# Patient Record
Sex: MALE | Race: WHITE | Employment: OTHER | URBAN - NONMETROPOLITAN AREA
[De-identification: names, ages, dates, MRNs, and addresses within clinical notes are randomized per-mention and may not be internally consistent; named-entity substitution may affect disease eponyms.]

---

## 2017-03-27 ENCOUNTER — OFFICE VISIT (OUTPATIENT)
Dept: CARDIOLOGY | Age: 77
End: 2017-03-27
Payer: MEDICARE

## 2017-03-27 VITALS
HEIGHT: 66 IN | DIASTOLIC BLOOD PRESSURE: 78 MMHG | BODY MASS INDEX: 31.82 KG/M2 | HEART RATE: 73 BPM | SYSTOLIC BLOOD PRESSURE: 120 MMHG | WEIGHT: 198 LBS

## 2017-03-27 DIAGNOSIS — I15.9 SECONDARY HYPERTENSION: Primary | ICD-10-CM

## 2017-03-27 PROCEDURE — 3017F COLORECTAL CA SCREEN DOC REV: CPT | Performed by: INTERNAL MEDICINE

## 2017-03-27 PROCEDURE — 1123F ACP DISCUSS/DSCN MKR DOCD: CPT | Performed by: INTERNAL MEDICINE

## 2017-03-27 PROCEDURE — 93000 ELECTROCARDIOGRAM COMPLETE: CPT | Performed by: INTERNAL MEDICINE

## 2017-03-27 PROCEDURE — G8484 FLU IMMUNIZE NO ADMIN: HCPCS | Performed by: INTERNAL MEDICINE

## 2017-03-27 PROCEDURE — G8427 DOCREV CUR MEDS BY ELIG CLIN: HCPCS | Performed by: INTERNAL MEDICINE

## 2017-03-27 PROCEDURE — 4040F PNEUMOC VAC/ADMIN/RCVD: CPT | Performed by: INTERNAL MEDICINE

## 2017-03-27 PROCEDURE — G8417 CALC BMI ABV UP PARAM F/U: HCPCS | Performed by: INTERNAL MEDICINE

## 2017-03-27 PROCEDURE — 1036F TOBACCO NON-USER: CPT | Performed by: INTERNAL MEDICINE

## 2017-03-27 PROCEDURE — 99204 OFFICE O/P NEW MOD 45 MIN: CPT | Performed by: INTERNAL MEDICINE

## 2017-03-27 RX ORDER — ASPIRIN 81 MG/1
81 TABLET, CHEWABLE ORAL DAILY
COMMUNITY
End: 2019-08-20 | Stop reason: DRUGHIGH

## 2017-03-27 RX ORDER — LISINOPRIL 5 MG/1
5 TABLET ORAL DAILY
COMMUNITY
Start: 2017-03-10 | End: 2018-04-10 | Stop reason: SDUPTHER

## 2017-03-27 RX ORDER — ATENOLOL 100 MG/1
100 TABLET ORAL DAILY
COMMUNITY
Start: 2017-02-26 | End: 2017-09-05 | Stop reason: SDUPTHER

## 2017-03-27 RX ORDER — FUROSEMIDE 20 MG/1
20 TABLET ORAL DAILY
COMMUNITY
Start: 2017-02-27 | End: 2017-09-05 | Stop reason: SDUPTHER

## 2017-03-27 ASSESSMENT — ENCOUNTER SYMPTOMS: SHORTNESS OF BREATH: 0

## 2017-09-05 DIAGNOSIS — E78.5 HYPERLIPIDEMIA, UNSPECIFIED HYPERLIPIDEMIA TYPE: Primary | ICD-10-CM

## 2017-09-06 RX ORDER — ATENOLOL 100 MG/1
100 TABLET ORAL DAILY
Qty: 90 TABLET | Refills: 2 | Status: SHIPPED | OUTPATIENT
Start: 2017-09-06 | End: 2017-09-07 | Stop reason: SDUPTHER

## 2017-09-06 RX ORDER — FUROSEMIDE 20 MG/1
20 TABLET ORAL DAILY
Qty: 90 TABLET | Refills: 2 | Status: SHIPPED | OUTPATIENT
Start: 2017-09-06 | End: 2017-09-07 | Stop reason: SDUPTHER

## 2017-09-07 DIAGNOSIS — E78.5 HYPERLIPIDEMIA, UNSPECIFIED HYPERLIPIDEMIA TYPE: ICD-10-CM

## 2017-09-07 DIAGNOSIS — I10 ESSENTIAL HYPERTENSION: Primary | ICD-10-CM

## 2017-09-08 RX ORDER — ATENOLOL 100 MG/1
100 TABLET ORAL DAILY
Qty: 30 TABLET | Refills: 5 | Status: SHIPPED | OUTPATIENT
Start: 2017-09-08 | End: 2018-04-10 | Stop reason: SDUPTHER

## 2017-09-08 RX ORDER — FUROSEMIDE 20 MG/1
20 TABLET ORAL DAILY
Qty: 30 TABLET | Refills: 5 | Status: SHIPPED | OUTPATIENT
Start: 2017-09-08 | End: 2018-04-10 | Stop reason: SDUPTHER

## 2017-10-02 ENCOUNTER — OFFICE VISIT (OUTPATIENT)
Dept: CARDIOLOGY | Age: 77
End: 2017-10-02
Payer: MEDICARE

## 2017-10-02 VITALS
DIASTOLIC BLOOD PRESSURE: 84 MMHG | WEIGHT: 193 LBS | SYSTOLIC BLOOD PRESSURE: 124 MMHG | BODY MASS INDEX: 31.02 KG/M2 | HEART RATE: 66 BPM | HEIGHT: 66 IN

## 2017-10-02 DIAGNOSIS — I10 ESSENTIAL HYPERTENSION: Primary | ICD-10-CM

## 2017-10-02 DIAGNOSIS — I42.8 CARDIOMYOPATHY, NONISCHEMIC (HCC): ICD-10-CM

## 2017-10-02 DIAGNOSIS — I47.29 NSVT (NONSUSTAINED VENTRICULAR TACHYCARDIA): ICD-10-CM

## 2017-10-02 PROCEDURE — 99213 OFFICE O/P EST LOW 20 MIN: CPT | Performed by: NURSE PRACTITIONER

## 2017-10-02 PROCEDURE — G8417 CALC BMI ABV UP PARAM F/U: HCPCS | Performed by: NURSE PRACTITIONER

## 2017-10-02 PROCEDURE — G8427 DOCREV CUR MEDS BY ELIG CLIN: HCPCS | Performed by: NURSE PRACTITIONER

## 2017-10-02 PROCEDURE — 1036F TOBACCO NON-USER: CPT | Performed by: NURSE PRACTITIONER

## 2017-10-02 PROCEDURE — 1123F ACP DISCUSS/DSCN MKR DOCD: CPT | Performed by: NURSE PRACTITIONER

## 2017-10-02 PROCEDURE — 93000 ELECTROCARDIOGRAM COMPLETE: CPT | Performed by: NURSE PRACTITIONER

## 2017-10-02 PROCEDURE — 4040F PNEUMOC VAC/ADMIN/RCVD: CPT | Performed by: NURSE PRACTITIONER

## 2017-10-02 PROCEDURE — G8484 FLU IMMUNIZE NO ADMIN: HCPCS | Performed by: NURSE PRACTITIONER

## 2017-10-02 PROCEDURE — 3017F COLORECTAL CA SCREEN DOC REV: CPT | Performed by: NURSE PRACTITIONER

## 2017-10-02 NOTE — MR AVS SNAPSHOT
· Weight loss. · Exercise. · Limit the use of salt. · Stop smoking. · If using decongestants or birth control pills, talk to your doctor. These medicines might make blood pressure higher. · Do not use street drugs. · Females should not drink more than 1 alcoholic drink per day. Males should not drink more than 2 alcoholic drinks per day. · Take your blood pressure medicine. You will need to take it every day. If you do not get treated, there are risks, including:   · Heart disease. · Stroke. · Kidney failure. · See your doctor as told. GET HELP RIGHT AWAY IF:  · You get a very bad headache. · You get blurred or changing vision. · You feel confused. · You feel weak, numb, or faint. · You get chest or belly (abdominal) pain. · You throw up (vomit). · You cannot breathe very well. If you have a blood pressure reading with a top number of 180 or higher, you need to see your doctor right away. This is especially true if you are having any of the problems listed above. Cardiomyopathy     Definition   Cardiomyopathy refers to heart muscle disease. The damaged heart does not effectively pump blood. The disease usually progresses to the point where patients develop life-threatening heart failure . In addition, people with cardiomyopathy are more likely to have irregular heartbeats or arrhythmias. There are two major categories of cardiomyopathy: ischemic and non-ischemic cardiomyopathy. Ischemic cardiomyopathy occurs when the heart muscle is damaged from heart attacks due to coronary artery disease . Non-ischemic cardiomyopathy, the less common category, includes types of cardiomyopathy that are not related to coronary artery disease. There are three main types of non-ischemic cardiomyopathy:   Dilated Damaged heart muscles lead to an enlarged, floppy heart. The heart stretches as it tries to compensate for weakened pumping ability. Blood clots may form due to the abnormal pooling of blood in the heart. If a clot moves to another part of the body ( embolism ), symptoms associated with that organ (the brain, for example) may be the first sign of the heart disease. Cardiomyopathy ultimately leads to heart failure and the following symptoms:   Fatigue   Weakness   Shortness of breath, often worse when lying down or with exertion   Cough   Swelling in feet or legs   Chest pain   Irregular heart rhythm   Diagnosis   The doctor will ask about your symptoms and medical history. A physical exam will be done. The doctor will listen to your heart with a stethoscope. Cardiomyopathies often produce heart murmurs and other abnormal heart sounds. Tests may include:   Chest x-ray a test that uses radiation to take a picture of structures inside the chest, used to look for heart enlargement   Electrocardiogram a test that records the heart's activity by measuring electrical currents through the heart muscle   Echocardiogram a test that uses high-frequency sound waves (ultrasound) to examine the size, shape, and motion of the heart   Blood tests to check for damage to the heart and other organs, and possibly for the underlying cause(s) of the cardiomyopathy   Cardiac catheterization a tube-like instrument inserted into the heart through a vein or artery (usually in the arm or leg) to detect problems with the heart and its blood supply   Heart biopsy removal of a sample of heart tissue for testing   Treatment   When heart failure is due to blockages in the coronary arteries, treatment directed at relieving these blockages through angioplasty , stent placement , or coronary artery bypass surgery may lead to improvements in heart function and symptoms. For certain genetic causes, other treatments may also lead to improvements in function. For many patients, however, treatment is aimed at relieving symptoms and prevent further damage.    Lifestyle Modification Changes aim to eliminate anything that contributes to the disease or worsens symptoms:   Avoid alcohol. If you are overweight, lose weight . Eat a low-fat diet to minimize the risk and extent of coronary artery disease. Limit salt intake to decrease fluid retention. Follow your doctor's advice for exercise . You may need to limit physical activity. Medications   Medications may include:   Diuretics to eliminate extra fluid   ACE inhibitors to help relax blood vessels, lower blood pressure, and decrease the heart's workload   Hydralazine and isosorbide dinitrate may be used in addition to ACE inhibitors   Angiotensin receptor blockers similar to ACE inhibitors   Digitalis to slow and regulate the heart rate, and modestly increase its force of contractions   Beta blockers to slow the heart and limit disease progression   Spironolactone to improve the outcome in people with dilated cardiomyopathy and advanced symptoms   Surgery   Surgical options include:   A pacemaker may be implanted to improve the heart rate and pattern. For people with hypertrophic disease, doctors may remove part of the thickened wall  the heart's chambers. Surgery may be needed to replace a heart valve. Another option is called alcohol septal ablation. This is a procedure to reduce symptoms and improve how the heart functions. For those with life-threatening, irregular heart rhythms, a cardioverter defibrillator may need to be implanted. A heart transplant may be possible for otherwise healthy patients who do not respond to medical treatment. Candidates often wait a long time for a new heart. Those waiting may temporarily receive a ventricular assist device, which is a mechanical pump that assumes some or most of the heart's pumping function. Prevention   Aggressively treating hypertension, coronary artery diseases, and their risk factors is the best way to prevent most cases of cardiomyopathy. Other, less common causes, however, are not preventable. People with a family history of the disease should ask the doctor about screening tests, especially before starting an intense exercise program.       Heart-Healthy Diet   Sodium, Fat, and Cholesterol Controlled Diet     What Is a Heart Healthy Diet? A heart-healthy diet is one that limits sodium , certain types of fat , and cholesterol . This type of diet is recommended for:   · People with any form of cardiovascular disease (eg, coronary heart disease , peripheral vascular disease , previous heart attack , previous stroke )   · People with risk factors for cardiovascular disease, such as high blood pressure , high cholesterol , or diabetes   · Anyone who wants to lower their risk of developing cardiovascular disease   Sodium   Sodium is a mineral found in many foods. In general, most people consume much more sodium than they need. Diets high in sodium can increase blood pressure and lead to edema (water retention). On a heart-healthy diet, you should consume no more than 2,300 mg (milligrams) of sodium per dayabout the amount in one teaspoon of table salt. The foods highest in sodium include table salt (about 50% sodium), processed foods, convenience foods, and preserved foods. Cholesterol   Cholesterol is a fat-like, waxy substance in your blood. Our bodies make some cholesterol. It is also found in animal products, with the highest amounts in fatty meat, egg yolks, whole milk, cheese, shellfish, and organ meats. On a heart-healthy diet, you should limit your cholesterol intake to less than 200 mg per day. It is normal and important to have some cholesterol in your bloodstream. But too much cholesterol can cause plaque to build up within your arteries, which can eventually lead to a heart attack or stroke.    The two types of cholesterol that are most commonly referred to are:   · Low-density lipoprotein (LDL) cholesterol Also known as bad cholesterol, this is the cholesterol that tends to build up along your arteries. Bad cholesterol levels are increased by eating fats that are saturated or hydrogenated. Optimal level of this cholesterol is less than 100. Over 130 starts to get risky for heart disease. · High-density lipoprotein (HDL) cholesterol Also known as good cholesterol, this type of cholesterol actually carries cholesterol away from your arteries and may, therefore, help lower your risk of having a heart attack. You want this level to be high (ideally greater than 60). It is a risk to have a level less than 40. You can raise this good cholesterol by eating olive oil, canola oil, avocados, or nuts. Exercise raises this level, too. Fat   Fat is calorie dense and packs a lot of calories into a small amount of food. Even though fats should be limited due to their high calorie content, not all fats are bad. In fact, some fats are quite healthful. Fat can be broken down into four main types.    · The good-for-you fats are:   ¨ Monounsaturated fat found in oils such as olive and canola, avocados, and nuts and natural nut butters; can decrease cholesterol levels, while keeping levels of HDL cholesterol high   ¨ Polyunsaturated fat found in oils such as safflower, sunflower, soybean, corn, and sesame; can decrease total cholesterol and LDL cholesterol   ¨ Omega-3 fatty acids particularly those found in fatty fish (such as salmon, trout, tuna, mackerel, herring, and sardines); can decrease risk of arrhythmias, decrease triglyceride levels, and slightly lower blood pressure   · The fats that you want to limit are:   ¨ Saturated fat found in animal products, many fast foods, and a few vegetables; increases total blood cholesterol, including LDL levels   § Animal fats that are saturated include: butter, lard, whole-milk dairy products, meat fat, and poultry skin   § Vegetable fats that are saturated include: hydrogenated shortening, palm Whole milk Reduced-fat (2%) milk Malted and chocolate milk Full fat yogurt Most cheeses (unless low-fat and low salt) Buttermilk (no more than 1 cup per week)   Meats and Beans   Lean cuts of fresh or frozen beef, veal, lamb, or pork (look for the word loin) Fresh or frozen poultry without the skin Fresh or frozen fish and some shellfish Egg whites and egg substitutes (Limit whole eggs to three per week) Tofu Nuts or seeds (unsalted, dry-roasted), low-sodium peanut butter Dried peas, beans, and lentils   Any smoked, cured, salted, or canned meat, fish, or poultry (including leon, chipped beef, cold cuts, hot dogs, sausages, sardines, and anchovies) Poultry skins Breaded and/or fried fish or meats Canned peas, beans, and lentils Salted nuts   Fats and Oils   Olive oil and canola oil Low-sodium, low-fat salad dressings and mayonnaise   Butter, margarine, coconut and palm oils, leon fat   Snacks, Sweets, and Condiments   Low-sodium or unsalted versions of broths, soups, soy sauce, and condiments Pepper, herbs, and spices; vinegar, lemon, or lime juice Low-fat frozen desserts (yogurt, sherbet, fruit bars) Sugar, cocoa powder, honey, syrup, jam, and preserves Low-fat, trans-fat free cookies, cakes, and pies Arnav and animal crackers, fig bars, meliza snaps   High-fat desserts Broth, soups, gravies, and sauces, made from instant mixes or other high-sodium ingredients Salted snack foods Canned olives Meat tenderizers, seasoning salt, and most flavored vinegars   Beverages   Low-sodium carbonated beverages Tea and coffee in moderation Soy milk   Commercially softened water   Suggestions   · Make whole grains, fruits, and vegetables the base of your diet. · Choose heart-healthy fats such as canola, olive, and flaxseed oil, and foods high in heart-healthy fats, such as nuts, seeds, soybeans, tofu, and fish.    · Eat fish at least twice per week; the fish highest in omega-3 fatty acids and lowest in mercury include salmon, herring, mackerel, sardines, and canned chunk light tuna. If you eat fish less than twice per week or have high triglycerides, talk to your doctor about taking fish oil supplements. · Read food labels. ¨ For products low in fat and cholesterol, look for fat free, low-fat, cholesterol free, saturated fat free, and trans fat freeAlso scan the Nutrition Facts Label, which lists saturated fat, trans fat, and cholesterol amounts. ¨ For products low in sodium, look for sodium free, very low sodium, low sodium, no added salt, and unsalted   · Skip the salt when cooking or at the table; if food needs more flavor, get creative and try out different herbs and spices. Garlic and onion also add substantial flavor to foods. · Trim any visible fat off meat and poultry before cooking, and drain the fat off after barger. · Use cooking methods that require little or no added fat, such as grilling, boiling, baking, poaching, broiling, roasting, steaming, stir-frying, and sauting. · Avoid fast food and convenience food. They tend to be high in saturated and trans fat and have a lot of added salt. · Talk to a registered dietitian for individualized diet advice.      Last Reviewed: March 2011 Masoud Ngo MS, MPH, RD   Updated: 3/29/2011               Medications and Orders      Your Current Medications Are              atenolol (TENORMIN) 100 MG tablet Take 1 tablet by mouth daily    furosemide (LASIX) 20 MG tablet Take 1 tablet by mouth daily    lisinopril (PRINIVIL;ZESTRIL) 5 MG tablet 5 mg daily    aspirin 81 MG chewable tablet Take 81 mg by mouth daily    Multiple Vitamin (MULTI VITAMIN DAILY PO) Take by mouth      Allergies              Penicillins       We Ordered/Performed the following           EKG 12 lead          Result Summary for EKG 12 lead      Result Information     Status          Final result (Collected: 10/2/2017  9:58 AM)           10/2/2017  9:58 AM Scans on Order [id number]            ECG on 10/2/2017  9:58 AM : ECG Report                     Additional Information        Basic Information     Date Of Birth Sex Race Ethnicity Preferred Language    1940 Male White Non-/Non  English      Preventive Care        Date Due    One-time abdominal aortic aneurism (AAA) screening is recommended for all men between the age of 73-68 who have ever smoked 1940    Tetanus Combination Vaccine (1 - Tdap) 6/26/1961    Cholesterol Screening 6/26/1982    Colonoscopy 6/26/1992    Zoster Vaccine 6/26/2002    Pneumococcal Vaccines (two) for all adults aged 72 and over (1 of 2 - PCV13) 6/26/2007    Yearly Flu Vaccine (1) 9/1/2017            MyChart Signup           [facility] allows you to send messages to your doctor, view your test results, renew your prescriptions, schedule appointments, view visit notes, and more. How Do I Sign Up? 1. In your Internet browser, go to [email or web address]  2. Click on the Sign Up Now link in the Sign In box. You will see the New Member Sign Up page. 3. Enter your [employer] Access Code exactly as it appears below. You will not need to use this code after youve completed the sign-up process. If you do not sign up before the expiration date, you must request a new code. [employer] Access Code: [email or web address]  Expires: 12/1/2017 10:38 AM    4. Enter your Social Security Number ([id number]) and Date of Birth (mm/dd/yyyy) as indicated and click Submit. You will be taken to the next sign-up page. 5. Create a [facility] ID. This will be your [employer] login ID and cannot be changed, so think of one that is secure and easy to remember. 6. Create a [employer] password. You can change your password at any time. 7. Enter your Password Reset Question and Answer. This can be used at a later time if you forget your password. 8. Enter your e-mail address.  You will receive e-mail notification when new information is available in [employer]. 9. Click Sign Up. You can now view your medical record. Additional Information  If you have questions, please contact the physician practice where you receive care. Remember, [employer] is NOT to be used for urgent needs. For medical emergencies, dial 911. For questions regarding your [employer] account call [phone]. If you have a clinical question, please call your doctor's office.

## 2017-10-02 NOTE — PATIENT INSTRUCTIONS
Hypertension  (High Blood Pressure)  Most people with high blood pressure (hypertension) have no symptoms. High blood pressure can be a dangerous problem. Hypertension is dangerous because you may have it and not know it. High blood pressure may mean that your heart needs to work harder to pump blood. Your blood pressure is measured with 2 numbers. The first number is when your heart flexes (contracts), and the second number is when your heart relaxes. The higher the numbers are, the more you are at risk for problems. Write down your blood pressure today. The best blood pressure for adults is 120/80 (mmHg) or lower. It is likely that your blood pressure was recorded at least 2 times today. It is important for you to give these numbers to your doctor. If you do not have a doctor, try to get follow-up care at a hospital or community clinic. You may need to start high blood pressure medicine. You may also need to adjust your medicines as told by your doctor. Even mild high blood pressure increases long-term health risks. One high reading does not mean you have hypertension. · Your blood pressure should be taken when you are relaxed. It is also important to sit for about 10 minutes before being tested. · Things that can increase your blood pressure are:  Injury, Illness, Stress, Caffeine, and some medicines (like decongestants). · High blood pressure does not usually need emergency treatment. HOME CARE  · Lifestyle and medicine changes may be needed, including:   · Weight loss. · Exercise. · Limit the use of salt. · Stop smoking. · If using decongestants or birth control pills, talk to your doctor. These medicines might make blood pressure higher. · Do not use street drugs. · Females should not drink more than 1 alcoholic drink per day. Males should not drink more than 2 alcoholic drinks per day. · Take your blood pressure medicine. You will need to take it every day.  If you do not get exact cause is not known. Possible causes include:   Dilated   The cause of the initial damage is often not found, but may include:   Ischemic heart disease with decreased blood flow to the heart   Infections, usually viral   Chronic exposure to toxins, including alcohol and some chemotherapy drugs   A rare complication of pregnancy or childbirth (probably immune-related)   Rarely, other illnesses, including rheumatoid arthritis , diabetes, or thyroid disease   Hypertrophic   Causes may include:   Inherited (sometimes present at birth but often developing in teens)   Aging, associated with hypertension   Restrictive   This is usually related to another condition, such as:   Amyloidosis protein fibers collect in the heart muscle   Sarcoidosis small inflammatory masses (granulomas) form in many organs   Hemochromatosis too much iron in the body   Risk Factors   A risk factor is something that increases your chance of getting a disease or condition. Risk factors for cardiomyopathy include:   Family members with cardiomyopathy   Alcoholism   Obesity   Diabetes   Hypertension   Coronary artery disease   Certain drugs   Symptoms   Symptoms vary, depending on the type of cardiomyopathy and its severity. Patients with hypertrophic cardiomyopathy often do not notice any symptoms. Sudden cardiac death may be the first indication of the condition. In dilated cardiomyopathy, it may take years for symptoms to develop. Blood clots may form due to the abnormal pooling of blood in the heart. If a clot moves to another part of the body ( embolism ), symptoms associated with that organ (the brain, for example) may be the first sign of the heart disease.    Cardiomyopathy ultimately leads to heart failure and the following symptoms:   Fatigue   Weakness   Shortness of breath, often worse when lying down or with exertion   Cough   Swelling in feet or legs   Chest pain   Irregular heart rhythm   Diagnosis   The doctor will ask decrease the heart's workload   Hydralazine and isosorbide dinitrate may be used in addition to ACE inhibitors   Angiotensin receptor blockers similar to ACE inhibitors   Digitalis to slow and regulate the heart rate, and modestly increase its force of contractions   Beta blockers to slow the heart and limit disease progression   Spironolactone to improve the outcome in people with dilated cardiomyopathy and advanced symptoms   Surgery   Surgical options include:   A pacemaker may be implanted to improve the heart rate and pattern. For people with hypertrophic disease, doctors may remove part of the thickened wall  the heart's chambers. Surgery may be needed to replace a heart valve. Another option is called alcohol septal ablation. This is a procedure to reduce symptoms and improve how the heart functions. For those with life-threatening, irregular heart rhythms, a cardioverter defibrillator may need to be implanted. A heart transplant may be possible for otherwise healthy patients who do not respond to medical treatment. Candidates often wait a long time for a new heart. Those waiting may temporarily receive a ventricular assist device, which is a mechanical pump that assumes some or most of the heart's pumping function. Prevention   Aggressively treating hypertension, coronary artery diseases, and their risk factors is the best way to prevent most cases of cardiomyopathy. Other, less common causes, however, are not preventable. People with a family history of the disease should ask the doctor about screening tests, especially before starting an intense exercise program.       Heart-Healthy Diet   Sodium, Fat, and Cholesterol Controlled Diet     What Is a Heart Healthy Diet? A heart-healthy diet is one that limits sodium , certain types of fat , and cholesterol .  This type of diet is recommended for:   · People with any form of cardiovascular disease (eg, coronary heart disease , peripheral needs more flavor, get creative and try out different herbs and spices. Garlic and onion also add substantial flavor to foods. · Trim any visible fat off meat and poultry before cooking, and drain the fat off after barger. · Use cooking methods that require little or no added fat, such as grilling, boiling, baking, poaching, broiling, roasting, steaming, stir-frying, and sauting. · Avoid fast food and convenience food. They tend to be high in saturated and trans fat and have a lot of added salt. · Talk to a registered dietitian for individualized diet advice.      Last Reviewed: March 2011 Maikel Lott MS, MPH, RD   Updated: 3/29/2011

## 2017-10-02 NOTE — PROGRESS NOTES
results found for: CREATININE, HGB, PROBNP    ECG 10/02/17  Sinus Rhythm, HR 66       Assessment, Recommendations, & Plan:  78 y.o. male with HTN, NICM, & NSVT    HTN- Controlled, No changes    NICM- On BB & ACE, No changes    NSVT- Denies palpitations, On atenolol, No changes      Disposition - RTC in 4 months or sooner if needed      Please do not hesitate to contact me for any questions or concerns.     Sincerely yours,    John Fisher, APRN

## 2018-04-10 ENCOUNTER — OFFICE VISIT (OUTPATIENT)
Dept: CARDIOLOGY | Age: 78
End: 2018-04-10
Payer: MEDICARE

## 2018-04-10 VITALS
BODY MASS INDEX: 32.14 KG/M2 | SYSTOLIC BLOOD PRESSURE: 140 MMHG | HEIGHT: 66 IN | HEART RATE: 74 BPM | WEIGHT: 200 LBS | DIASTOLIC BLOOD PRESSURE: 90 MMHG

## 2018-04-10 DIAGNOSIS — I10 ESSENTIAL HYPERTENSION: Primary | ICD-10-CM

## 2018-04-10 DIAGNOSIS — E78.5 HYPERLIPIDEMIA, UNSPECIFIED HYPERLIPIDEMIA TYPE: ICD-10-CM

## 2018-04-10 PROCEDURE — 4040F PNEUMOC VAC/ADMIN/RCVD: CPT | Performed by: INTERNAL MEDICINE

## 2018-04-10 PROCEDURE — G8417 CALC BMI ABV UP PARAM F/U: HCPCS | Performed by: INTERNAL MEDICINE

## 2018-04-10 PROCEDURE — 1123F ACP DISCUSS/DSCN MKR DOCD: CPT | Performed by: INTERNAL MEDICINE

## 2018-04-10 PROCEDURE — 3017F COLORECTAL CA SCREEN DOC REV: CPT | Performed by: INTERNAL MEDICINE

## 2018-04-10 PROCEDURE — 1036F TOBACCO NON-USER: CPT | Performed by: INTERNAL MEDICINE

## 2018-04-10 PROCEDURE — G8427 DOCREV CUR MEDS BY ELIG CLIN: HCPCS | Performed by: INTERNAL MEDICINE

## 2018-04-10 PROCEDURE — 93000 ELECTROCARDIOGRAM COMPLETE: CPT | Performed by: INTERNAL MEDICINE

## 2018-04-10 PROCEDURE — 99214 OFFICE O/P EST MOD 30 MIN: CPT | Performed by: INTERNAL MEDICINE

## 2018-04-10 RX ORDER — ATENOLOL 100 MG/1
100 TABLET ORAL DAILY
Qty: 90 TABLET | Refills: 3 | Status: ON HOLD | OUTPATIENT
Start: 2018-04-10 | End: 2019-03-18 | Stop reason: HOSPADM

## 2018-04-10 RX ORDER — FUROSEMIDE 20 MG/1
20 TABLET ORAL DAILY
Qty: 90 TABLET | Refills: 3 | Status: ON HOLD | OUTPATIENT
Start: 2018-04-10 | End: 2019-03-18 | Stop reason: HOSPADM

## 2018-04-10 RX ORDER — LISINOPRIL 10 MG/1
10 TABLET ORAL DAILY
Qty: 90 TABLET | Refills: 3 | Status: ON HOLD | OUTPATIENT
Start: 2018-04-10 | End: 2019-03-18 | Stop reason: HOSPADM

## 2018-04-10 ASSESSMENT — ENCOUNTER SYMPTOMS: SHORTNESS OF BREATH: 0

## 2018-07-31 ENCOUNTER — TELEPHONE (OUTPATIENT)
Dept: CARDIOLOGY | Age: 78
End: 2018-07-31

## 2018-10-09 ENCOUNTER — TRANSCRIBE ORDERS (OUTPATIENT)
Dept: ADMINISTRATIVE | Facility: HOSPITAL | Age: 78
End: 2018-10-09

## 2018-10-09 DIAGNOSIS — R19.09 OTHER INTRA-ABDOMINAL AND PELVIC SWELLING, MASS AND LUMP: Primary | ICD-10-CM

## 2018-10-09 DIAGNOSIS — J98.4 OTHER DISORDERS OF LUNG: ICD-10-CM

## 2018-10-11 ENCOUNTER — HOSPITAL ENCOUNTER (OUTPATIENT)
Dept: MRI IMAGING | Facility: HOSPITAL | Age: 78
Discharge: HOME | End: 2018-10-11
Attending: INTERNAL MEDICINE

## 2018-10-11 ENCOUNTER — HOSPITAL ENCOUNTER (OUTPATIENT)
Dept: CT IMAGING | Facility: HOSPITAL | Age: 78
Discharge: HOME | End: 2018-10-11
Attending: INTERNAL MEDICINE | Admitting: RADIOLOGY

## 2018-10-11 VITALS
OXYGEN SATURATION: 99 % | SYSTOLIC BLOOD PRESSURE: 143 MMHG | RESPIRATION RATE: 16 BRPM | DIASTOLIC BLOOD PRESSURE: 77 MMHG | WEIGHT: 180 LBS | BODY MASS INDEX: 28.93 KG/M2 | HEIGHT: 66 IN | TEMPERATURE: 96.4 F | HEART RATE: 75 BPM

## 2018-10-11 DIAGNOSIS — R19.09 OTHER INTRA-ABDOMINAL AND PELVIC SWELLING, MASS AND LUMP: ICD-10-CM

## 2018-10-11 DIAGNOSIS — J98.4 OTHER DISORDERS OF LUNG: ICD-10-CM

## 2018-10-11 LAB
ANION GAP SERPL CALCULATED.3IONS-SCNC: 10 MMOL/L (ref 4–13)
APTT PPP: 36.8 SECONDS (ref 24.1–34.8)
BUN BLD-MCNC: 23 MG/DL (ref 5–21)
BUN/CREAT SERPL: 12.3 (ref 7–25)
CALCIUM SPEC-SCNC: 10.3 MG/DL (ref 8.4–10.4)
CHLORIDE SERPL-SCNC: 106 MMOL/L (ref 98–110)
CO2 SERPL-SCNC: 25 MMOL/L (ref 24–31)
CREAT BLD-MCNC: 1.87 MG/DL (ref 0.5–1.4)
GFR SERPL CREATININE-BSD FRML MDRD: 35 ML/MIN/1.73
GLUCOSE BLD-MCNC: 120 MG/DL (ref 70–100)
INR PPP: 1.07 (ref 0.91–1.09)
PLATELET # BLD AUTO: 563 10*3/MM3 (ref 130–400)
POTASSIUM BLD-SCNC: 3.9 MMOL/L (ref 3.5–5.3)
PROTHROMBIN TIME: 14.2 SECONDS (ref 11.9–14.6)
SODIUM BLD-SCNC: 141 MMOL/L (ref 135–145)

## 2018-10-11 PROCEDURE — 80048 BASIC METABOLIC PNL TOTAL CA: CPT | Performed by: INTERNAL MEDICINE

## 2018-10-11 PROCEDURE — 0 GADOBENATE DIMEGLUMINE 529 MG/ML SOLUTION: Performed by: INTERNAL MEDICINE

## 2018-10-11 PROCEDURE — 85610 PROTHROMBIN TIME: CPT | Performed by: INTERNAL MEDICINE

## 2018-10-11 PROCEDURE — 85049 AUTOMATED PLATELET COUNT: CPT | Performed by: INTERNAL MEDICINE

## 2018-10-11 PROCEDURE — 71250 CT THORAX DX C-: CPT

## 2018-10-11 PROCEDURE — 85730 THROMBOPLASTIN TIME PARTIAL: CPT | Performed by: INTERNAL MEDICINE

## 2018-10-11 PROCEDURE — 74183 MRI ABD W/O CNTR FLWD CNTR: CPT

## 2018-10-11 PROCEDURE — A9577 INJ MULTIHANCE: HCPCS | Performed by: INTERNAL MEDICINE

## 2018-10-11 RX ORDER — SODIUM CHLORIDE 0.9 % (FLUSH) 0.9 %
3 SYRINGE (ML) INJECTION EVERY 12 HOURS SCHEDULED
Status: DISCONTINUED | OUTPATIENT
Start: 2018-10-11 | End: 2018-10-12 | Stop reason: HOSPADM

## 2018-10-11 RX ORDER — FERROUS SULFATE 325(65) MG
325 TABLET ORAL
Status: ON HOLD | COMMUNITY
End: 2019-01-03

## 2018-10-11 RX ORDER — LISINOPRIL 10 MG/1
10 TABLET ORAL
COMMUNITY
Start: 2018-04-10 | End: 2019-03-04

## 2018-10-11 RX ORDER — ATENOLOL 100 MG/1
100 TABLET ORAL
COMMUNITY
Start: 2018-04-10 | End: 2019-03-04

## 2018-10-11 RX ORDER — SODIUM CHLORIDE 0.9 % (FLUSH) 0.9 %
3-10 SYRINGE (ML) INJECTION AS NEEDED
Status: DISCONTINUED | OUTPATIENT
Start: 2018-10-11 | End: 2018-10-12 | Stop reason: HOSPADM

## 2018-10-11 RX ORDER — ASPIRIN 81 MG/1
81 TABLET, CHEWABLE ORAL DAILY
COMMUNITY
End: 2019-03-04

## 2018-10-11 RX ORDER — FUROSEMIDE 20 MG/1
20 TABLET ORAL
COMMUNITY
Start: 2018-04-10 | End: 2019-03-04

## 2018-10-11 RX ADMIN — GADOBENATE DIMEGLUMINE 16 ML: 529 INJECTION, SOLUTION INTRAVENOUS at 09:14

## 2018-10-23 ENCOUNTER — HOSPITAL ENCOUNTER (OUTPATIENT)
Dept: GENERAL RADIOLOGY | Facility: HOSPITAL | Age: 78
Discharge: HOME | End: 2018-10-23
Admitting: INTERNAL MEDICINE

## 2018-10-23 ENCOUNTER — APPOINTMENT (OUTPATIENT)
Dept: PREADMISSION TESTING | Facility: HOSPITAL | Age: 78
End: 2018-10-23

## 2018-10-23 VITALS
SYSTOLIC BLOOD PRESSURE: 156 MMHG | RESPIRATION RATE: 16 BRPM | HEART RATE: 84 BPM | OXYGEN SATURATION: 94 % | HEIGHT: 64 IN | BODY MASS INDEX: 31.35 KG/M2 | DIASTOLIC BLOOD PRESSURE: 85 MMHG | WEIGHT: 183.64 LBS

## 2018-10-23 LAB
DEPRECATED RDW RBC AUTO: 51 FL (ref 40–54)
ERYTHROCYTE [DISTWIDTH] IN BLOOD BY AUTOMATED COUNT: 17.9 % (ref 12–15)
HCT VFR BLD AUTO: 30.3 % (ref 40–52)
HGB BLD-MCNC: 9.1 G/DL (ref 14–18)
MCH RBC QN AUTO: 23.7 PG (ref 28–32)
MCHC RBC AUTO-ENTMCNC: 30 G/DL (ref 33–36)
MCV RBC AUTO: 78.9 FL (ref 82–95)
PLATELET # BLD AUTO: 478 10*3/MM3 (ref 130–400)
PMV BLD AUTO: 11 FL (ref 6–12)
RBC # BLD AUTO: 3.84 10*6/MM3 (ref 4.8–5.9)
WBC NRBC COR # BLD: 10.9 10*3/MM3 (ref 4.8–10.8)

## 2018-10-23 PROCEDURE — 85027 COMPLETE CBC AUTOMATED: CPT | Performed by: INTERNAL MEDICINE

## 2018-10-23 PROCEDURE — 71046 X-RAY EXAM CHEST 2 VIEWS: CPT

## 2018-10-23 PROCEDURE — 93010 ELECTROCARDIOGRAM REPORT: CPT | Performed by: INTERNAL MEDICINE

## 2018-10-23 PROCEDURE — 93005 ELECTROCARDIOGRAM TRACING: CPT

## 2018-10-23 PROCEDURE — 36415 COLL VENOUS BLD VENIPUNCTURE: CPT

## 2018-10-23 RX ORDER — MULTIPLE VITAMINS W/ MINERALS TAB 9MG-400MCG
1 TAB ORAL DAILY
COMMUNITY
End: 2019-03-04

## 2018-10-23 NOTE — DISCHARGE INSTRUCTIONS
DAY OF SURGERY INSTRUCTIONS        YOUR SURGEON: ***DR. ELVA RAPHAEL    PROCEDURE: ***BRONCHOSCOPY     DATE OF SURGERY: ***10/24/2018    ARRIVAL TIME: AS DIRECTED BY OFFICE    DAY OF SURGERY TAKE ONLY THESE MEDICATIONS UNLESS OTHERWISE INSTRUCTED BY YOUR PHYSICIAN: ***ATENOLOL          MANAGING PAIN AFTER SURGERY    We know you are probably wondering what your pain will be like after surgery.  Following surgery it is unrealistic to expect you will not have pain.   Pain is how our bodies let us know that something is wrong or cautions us to be careful.  That said, our goal is to make your pain tolerable.    Methods we may use to treat your pain include (oral or IV medications, PCAs, epidurals, nerve blocks, etc.)   While some procedures require IV pain medications for a short time after surgery, transitioning to pain medications by mouth allows for better management of pain.   Your nurse will encourage you to take oral pain medications whenever possible.  IV medications work almost immediately, but only last a short while.  Taking medications by mouth allows for a more constant level of medication in your blood stream for a longer period of time.      Once your pain is out of control it is harder to get back under control.  It is important you are aware when your next dose of pain medication is due.  If you are admitted, your nurse may write the time of your next dose on the white board in your room to help you remember.      We are interested in your pain and encourage you to inform us about aggravating factors during your visit.   Many times a simple repositioning every few hours can make a big difference.    If your physician says it is okay, do not let your pain prevent you from getting out of bed. Be sure to call your nurse for assistance prior to getting up so you do not fall.      Before surgery, please decide your tolerable pain goal.  These faces help describe the pain ratings we use on a 0-10 scale.   Be  prepared to tell us your goal and whether or not you take pain or anxiety medications at home.            BEFORE YOU COME TO THE HOSPITAL  (Pre-op instructions)  • Do not eat, drink, smoke or chew gum after midnight the night before surgery.  This also includes no mints.  • Morning of surgery take only the medicines you have been instructed with a sip of water unless otherwise instructed  by your physician.  • Do not shave, wear makeup or dark nail polish.  • Remove all jewelry including rings.  • Leave anything you consider valuable at home.  • Leave your suitcase in the car until after your surgery.  • Bring the following with you if applicable:  o Picture ID and insurance, Medicare or Medicaid cards  o Co-pay/deductible required by insurance (cash, check, credit card)  o Copy of advance directive, living will or power-of- documents if not brought to PAT  o CPAP or BIPAP mask and tubing  o Relaxation aids (MP3 player, book, magazine)  • On the day of surgery check in at registration located at the main entrance of the hospital.       Outpatient Surgery Guidelines, Adult  Outpatient procedures are those for which the person having the procedure is allowed to go home the same day as the procedure. Various procedures are done on an outpatient basis. You should follow some general guidelines if you will be having an outpatient procedure.  LET YOUR HEALTH CARE PROVIDER KNOW ABOUT:  · Any allergies you have.  · All medicines you are taking, including vitamins, herbs, eye drops, creams, and over-the-counter medicines.  · Previous problems you or members of your family have had with the use of anesthetics.  · Any blood disorders you have.  · Previous surgeries you have had.  · Medical conditions you have.  RISKS AND COMPLICATIONS  Your health care provider will discuss possible risks and complications with you before surgery. Common risks and complications include:    · Problems due to the use of  anesthetics.  · Blood loss and replacement (does not apply to minor surgical procedures).  · Temporary increase in pain due to surgery.  · Uncorrected pain or problems that the surgery was meant to correct.  · Infection.  · New damage.  BEFORE THE PROCEDURE  · Ask your health care provider about changing or stopping your regular medicines. You may need to stop taking certain medicines in the days or weeks before the procedure.  · Stop smoking at least 2 weeks before surgery. This lowers your risk for complications during and after surgery. Ask your health care provider for help with this if needed.  · Eat your usual meals and a light supper the day before surgery. Continue fluid intake. Do not drink alcohol.  · Do not eat or drink after midnight the night before your surgery.   · Arrange for someone to take you home and to stay with you for 24 hours after the procedure. Medicine given for your procedure may affect your ability to drive or to care for yourself.  · Call your health care provider's office if you develop an illness or problem that may prevent you from safely having your procedure.  AFTER THE PROCEDURE  After surgery, you will be taken to a recovery area, where your progress will be monitored. If there are no complications, you will be allowed to go home when you are awake, stable, and taking fluids well. You may have numbness around the surgical site. Healing will take some time. You will have tenderness at the surgical site and may have some swelling and bruising. You may also have some nausea.  HOME CARE INSTRUCTIONS  · Do not drive for 24 hours, or as directed by your health care provider. Do not drive while taking prescription pain medicines.  · Do not drink alcohol for 24 hours.  · Do not make important decisions or sign legal documents for 24 hours.  · You may resume a normal diet and activities as directed.  · Do not lift anything heavier than 10 pounds (4.5 kg) or play contact sports until your  health care provider says it is okay.  · Change your bandages (dressings) as directed.  · Only take over-the-counter or prescription medicines as directed by your health care provider.  · Follow up with your health care provider as directed.  SEEK MEDICAL CARE IF:  · You have increased bleeding (more than a small spot) from the surgical site.  · You have redness, swelling, or increasing pain in the wound.  · You see pus coming from the wound.  · You have a fever.  · You notice a bad smell coming from the wound or dressing.  · You feel lightheaded or faint.  · You develop a rash.  · You have trouble breathing.  · You develop allergies.  MAKE SURE YOU:  · Understand these instructions.  · Will watch your condition.  · Will get help right away if you are not doing well or get worse.     This information is not intended to replace advice given to you by your health care provider. Make sure you discuss any questions you have with your health care provider.     Document Released: 09/12/2002 Document Revised: 05/03/2016 Document Reviewed: 05/22/2014  [employer] Interactive Patient Education ©2016 [employer]       Fall Prevention in Hospitals, Adult  As a hospital patient, your condition and the treatments you receive can increase your risk for falls. Some additional risk factors for falls in a hospital include:  · Being in an unfamiliar environment.  · Being on bed rest.  · Your surgery.  · Taking certain medicines.  · Your tubing requirements, such as intravenous (IV) therapy or catheters.  It is important that you learn how to decrease fall risks while at the hospital. Below are important tips that can help prevent falls.  SAFETY TIPS FOR PREVENTING FALLS  Talk about your risk of falling.  · Ask your health care provider why you are at risk for falling. Is it your medicine, illness, tubing placement, or something else?  · Make a plan with your health care provider to keep you safe from falls.  · Ask your health care  provider or pharmacist about side effects of your medicines. Some medicines can make you dizzy or affect your coordination.  Ask for help.  · Ask for help before getting out of bed. You may need to press your call button.  · Ask for assistance in getting safely to the toilet.  · Ask for a walker or cane to be put at your bedside. Ask that most of the side rails on your bed be placed up before your health care provider leaves the room.  · Ask family or friends to sit with you.  · Ask for things that are out of your reach, such as your glasses, hearing aids, telephone, bedside table, or call button.  Follow these tips to avoid falling:  · Stay lying or seated, rather than standing, while waiting for help.  · Wear rubber-soled slippers or shoes whenever you walk in the hospital.  · Avoid quick, sudden movements.  ¨ Change positions slowly.  ¨ Sit on the side of your bed before standing.  ¨ Stand up slowly and wait before you start to walk.  · Let your health care provider know if there is a spill on the floor.  · Pay careful attention to the medical equipment, electrical cords, and tubes around you.  · When you need help, use your call button by your bed or in the bathroom. Wait for one of your health care providers to help you.  · If you feel dizzy or unsure of your footing, return to bed and wait for assistance.  · Avoid being distracted by the TV, telephone, or another person in your room.  · Do not lean or support yourself on rolling objects, such as IV poles or bedside tables.     This information is not intended to replace advice given to you by your health care provider. Make sure you discuss any questions you have with your health care provider.     Document Released: 12/15/2001 Document Revised: 01/08/2016 Document Reviewed: 08/25/2013  [employer] Interactive Patient Education ©2016 [employer]       Surgical Site Infections FAQs  What is a Surgical Site Infection (SSI)?  A surgical site infection is an  infection that occurs after surgery in the part of the body where the surgery took place. Most patients who have surgery do not develop an infection. However, infections develop in about 1 to 3 out of every 100 patients who have surgery.  Some of the common symptoms of a surgical site infection are:  · Redness and pain around the area where you had surgery  · Drainage of cloudy fluid from your surgical wound  · Fever  Can SSIs be treated?  Yes. Most surgical site infections can be treated with antibiotics. The antibiotic given to you depends on the bacteria (germs) causing the infection. Sometimes patients with SSIs also need another surgery to treat the infection.  What are some of the things that hospitals are doing to prevent SSIs?  To prevent SSIs, doctors, nurses, and other healthcare providers:  · Clean their hands and arms up to their elbows with an antiseptic agent just before the surgery.  · Clean their hands with soap and water or an alcohol-based hand rub before and after caring for each patient.  · May remove some of your hair immediately before your surgery using electric clippers if the hair is in the same area where the procedure will occur. They should not shave you with a razor.  · Wear special hair covers, masks, gowns, and gloves during surgery to keep the surgery area clean.  · Give you antibiotics before your surgery starts. In most cases, you should get antibiotics within 60 minutes before the surgery starts and the antibiotics should be stopped within 24 hours after surgery.  · Clean the skin at the site of your surgery with a special soap that kills germs.  What can I do to help prevent SSIs?  Before your surgery:  · Tell your doctor about other medical problems you may have. Health problems such as allergies, diabetes, and obesity could affect your surgery and your treatment.  · Quit smoking. Patients who smoke get more infections. Talk to your doctor about how you can quit before your  surgery.  · Do not shave near where you will have surgery. Shaving with a razor can irritate your skin and make it easier to develop an infection.  At the time of your surgery:  · Speak up if someone tries to shave you with a razor before surgery. Ask why you need to be shaved and talk with your surgeon if you have any concerns.  · Ask if you will get antibiotics before surgery.  After your surgery:  · Make sure that your healthcare providers clean their hands before examining you, either with soap and water or an alcohol-based hand rub.  · If you do not see your providers clean their hands, please ask them to do so.  · Family and friends who visit you should not touch the surgical wound or dressings.  · Family and friends should clean their hands with soap and water or an alcohol-based hand rub before and after visiting you. If you do not see them clean their hands, ask them to clean their hands.  What do I need to do when I go home from the hospital?  · Before you go home, your doctor or nurse should explain everything you need to know about taking care of your wound. Make sure you understand how to care for your wound before you leave the hospital.  · Always clean your hands before and after caring for your wound.  · Before you go home, make sure you know who to contact if you have questions or problems after you get home.  · If you have any symptoms of an infection, such as redness and pain at the surgery site, drainage, or fever, call your doctor immediately.  If you have additional questions, please ask your doctor or nurse.  Developed and co-sponsored by The Society for Healthcare Epidemiology of [place] (SHEA); Infectious Diseases Society of [place] (IDSA); American Hospital Association; Association for Professionals in Infection Control and Epidemiology (APIC); Centers for Disease Control and Prevention (CDC); and The Joint Commission.     This information is not intended to replace advice given to you by  your health care provider. Make sure you discuss any questions you have with your health care provider.     Document Released: 12/23/2014 Document Revised: 01/08/2016 Document Reviewed: 03/02/2016  [employer] Interactive Patient Education ©2016 [employer]

## 2018-10-24 ENCOUNTER — ANESTHESIA (OUTPATIENT)
Dept: PERIOP | Facility: HOSPITAL | Age: 78
End: 2018-10-24

## 2018-10-24 ENCOUNTER — ANESTHESIA EVENT (OUTPATIENT)
Dept: PERIOP | Facility: HOSPITAL | Age: 78
End: 2018-10-24

## 2018-10-24 ENCOUNTER — HOSPITAL ENCOUNTER (OUTPATIENT)
Facility: HOSPITAL | Age: 78
Setting detail: HOSPITAL OUTPATIENT SURGERY
Discharge: HOME | End: 2018-10-24
Attending: INTERNAL MEDICINE | Admitting: INTERNAL MEDICINE

## 2018-10-24 VITALS
RESPIRATION RATE: 16 BRPM | OXYGEN SATURATION: 94 % | HEART RATE: 78 BPM | TEMPERATURE: 98.4 F | DIASTOLIC BLOOD PRESSURE: 70 MMHG | SYSTOLIC BLOOD PRESSURE: 136 MMHG

## 2018-10-24 DIAGNOSIS — R91.8 LUNG MASS: ICD-10-CM

## 2018-10-24 PROBLEM — I47.29 NSVT (NONSUSTAINED VENTRICULAR TACHYCARDIA) (HCC): Status: ACTIVE | Noted: 2017-10-02

## 2018-10-24 PROBLEM — R10.9 ABDOMINAL PAIN: Status: ACTIVE | Noted: 2018-09-11

## 2018-10-24 PROBLEM — D47.2 MONOCLONAL GAMMOPATHY OF UNKNOWN SIGNIFICANCE (MGUS): Status: ACTIVE | Noted: 2018-09-11

## 2018-10-24 PROBLEM — IMO0002 BODY MASS INDEX (BMI) OF 25.0 TO 29.9: Status: ACTIVE | Noted: 2018-10-02

## 2018-10-24 PROBLEM — E83.52 HYPERCALCEMIA: Status: ACTIVE | Noted: 2018-09-11

## 2018-10-24 PROBLEM — I10 HYPERTENSIVE DISORDER: Status: ACTIVE | Noted: 2018-08-28

## 2018-10-24 PROBLEM — I42.8 CARDIOMYOPATHY, NONISCHEMIC (HCC): Status: ACTIVE | Noted: 2017-10-02

## 2018-10-24 PROBLEM — I10 ESSENTIAL HYPERTENSION: Status: ACTIVE | Noted: 2017-10-02

## 2018-10-24 PROBLEM — R06.00 DYSPNEA: Status: ACTIVE | Noted: 2018-09-11

## 2018-10-24 PROBLEM — R59.0 MEDIASTINAL ADENOPATHY: Status: ACTIVE | Noted: 2018-10-24

## 2018-10-24 LAB — KOH PREP NAIL: NORMAL

## 2018-10-24 PROCEDURE — 87116 MYCOBACTERIA CULTURE: CPT | Performed by: INTERNAL MEDICINE

## 2018-10-24 PROCEDURE — 88334 PATH CONSLTJ SURG CYTO XM EA: CPT | Performed by: INTERNAL MEDICINE

## 2018-10-24 PROCEDURE — 25010000002 SUCCINYLCHOLINE PER 20 MG: Performed by: NURSE ANESTHETIST, CERTIFIED REGISTERED

## 2018-10-24 PROCEDURE — 88305 TISSUE EXAM BY PATHOLOGIST: CPT | Performed by: INTERNAL MEDICINE

## 2018-10-24 PROCEDURE — 87102 FUNGUS ISOLATION CULTURE: CPT | Performed by: INTERNAL MEDICINE

## 2018-10-24 PROCEDURE — 25010000002 PROPOFOL 10 MG/ML EMULSION: Performed by: NURSE ANESTHETIST, CERTIFIED REGISTERED

## 2018-10-24 PROCEDURE — 88104 CYTOPATH FL NONGYN SMEARS: CPT | Performed by: INTERNAL MEDICINE

## 2018-10-24 PROCEDURE — 87077 CULTURE AEROBIC IDENTIFY: CPT | Performed by: INTERNAL MEDICINE

## 2018-10-24 PROCEDURE — 88177 CYTP FNA EVAL EA ADDL: CPT | Performed by: INTERNAL MEDICINE

## 2018-10-24 PROCEDURE — 87070 CULTURE OTHR SPECIMN AEROBIC: CPT | Performed by: INTERNAL MEDICINE

## 2018-10-24 PROCEDURE — 88173 CYTOPATH EVAL FNA REPORT: CPT | Performed by: INTERNAL MEDICINE

## 2018-10-24 PROCEDURE — 88162 CYTOPATH SMEAR OTHER SOURCE: CPT | Performed by: INTERNAL MEDICINE

## 2018-10-24 PROCEDURE — 88312 SPECIAL STAINS GROUP 1: CPT | Performed by: INTERNAL MEDICINE

## 2018-10-24 PROCEDURE — 88172 CYTP DX EVAL FNA 1ST EA SITE: CPT | Performed by: INTERNAL MEDICINE

## 2018-10-24 PROCEDURE — 25010000002 ONDANSETRON PER 1 MG: Performed by: NURSE ANESTHETIST, CERTIFIED REGISTERED

## 2018-10-24 PROCEDURE — 87206 SMEAR FLUORESCENT/ACID STAI: CPT | Performed by: INTERNAL MEDICINE

## 2018-10-24 PROCEDURE — C1726 CATH, BAL DIL, NON-VASCULAR: HCPCS | Performed by: INTERNAL MEDICINE

## 2018-10-24 PROCEDURE — 25010000002 DEXAMETHASONE PER 1 MG: Performed by: ANESTHESIOLOGY

## 2018-10-24 PROCEDURE — 87220 TISSUE EXAM FOR FUNGI: CPT | Performed by: INTERNAL MEDICINE

## 2018-10-24 PROCEDURE — 87205 SMEAR GRAM STAIN: CPT | Performed by: INTERNAL MEDICINE

## 2018-10-24 RX ORDER — SODIUM CHLORIDE 0.9 % (FLUSH) 0.9 %
3 SYRINGE (ML) INJECTION AS NEEDED
Status: DISCONTINUED | OUTPATIENT
Start: 2018-10-24 | End: 2018-10-24 | Stop reason: HOSPADM

## 2018-10-24 RX ORDER — SODIUM CHLORIDE, SODIUM LACTATE, POTASSIUM CHLORIDE, CALCIUM CHLORIDE 600; 310; 30; 20 MG/100ML; MG/100ML; MG/100ML; MG/100ML
100 INJECTION, SOLUTION INTRAVENOUS CONTINUOUS
Status: DISCONTINUED | OUTPATIENT
Start: 2018-10-24 | End: 2018-10-24 | Stop reason: HOSPADM

## 2018-10-24 RX ORDER — LIDOCAINE HYDROCHLORIDE 20 MG/ML
5 INJECTION, SOLUTION EPIDURAL; INFILTRATION; INTRACAUDAL; PERINEURAL ONCE
Status: COMPLETED | OUTPATIENT
Start: 2018-10-24 | End: 2018-10-24

## 2018-10-24 RX ORDER — IPRATROPIUM BROMIDE AND ALBUTEROL SULFATE 2.5; .5 MG/3ML; MG/3ML
3 SOLUTION RESPIRATORY (INHALATION) ONCE
Status: COMPLETED | OUTPATIENT
Start: 2018-10-24 | End: 2018-10-24

## 2018-10-24 RX ORDER — DEXAMETHASONE SODIUM PHOSPHATE 4 MG/ML
4 INJECTION, SOLUTION INTRA-ARTICULAR; INTRALESIONAL; INTRAMUSCULAR; INTRAVENOUS; SOFT TISSUE ONCE AS NEEDED
Status: COMPLETED | OUTPATIENT
Start: 2018-10-24 | End: 2018-10-24

## 2018-10-24 RX ORDER — FENTANYL CITRATE 50 UG/ML
25 INJECTION, SOLUTION INTRAMUSCULAR; INTRAVENOUS AS NEEDED
Status: DISCONTINUED | OUTPATIENT
Start: 2018-10-24 | End: 2018-10-24 | Stop reason: HOSPADM

## 2018-10-24 RX ORDER — SUCCINYLCHOLINE CHLORIDE 20 MG/ML
INJECTION INTRAMUSCULAR; INTRAVENOUS AS NEEDED
Status: DISCONTINUED | OUTPATIENT
Start: 2018-10-24 | End: 2018-10-24 | Stop reason: SURG

## 2018-10-24 RX ORDER — ONDANSETRON 2 MG/ML
4 INJECTION INTRAMUSCULAR; INTRAVENOUS ONCE AS NEEDED
Status: DISCONTINUED | OUTPATIENT
Start: 2018-10-24 | End: 2018-10-24 | Stop reason: HOSPADM

## 2018-10-24 RX ORDER — IPRATROPIUM BROMIDE AND ALBUTEROL SULFATE 2.5; .5 MG/3ML; MG/3ML
3 SOLUTION RESPIRATORY (INHALATION) ONCE AS NEEDED
Status: DISCONTINUED | OUTPATIENT
Start: 2018-10-24 | End: 2018-10-24 | Stop reason: HOSPADM

## 2018-10-24 RX ORDER — ROCURONIUM BROMIDE 10 MG/ML
INJECTION, SOLUTION INTRAVENOUS AS NEEDED
Status: DISCONTINUED | OUTPATIENT
Start: 2018-10-24 | End: 2018-10-24 | Stop reason: SURG

## 2018-10-24 RX ORDER — OXYCODONE AND ACETAMINOPHEN 10; 325 MG/1; MG/1
1 TABLET ORAL ONCE AS NEEDED
Status: DISCONTINUED | OUTPATIENT
Start: 2018-10-24 | End: 2018-10-24 | Stop reason: HOSPADM

## 2018-10-24 RX ORDER — PROPOFOL 10 MG/ML
VIAL (ML) INTRAVENOUS AS NEEDED
Status: DISCONTINUED | OUTPATIENT
Start: 2018-10-24 | End: 2018-10-24 | Stop reason: SURG

## 2018-10-24 RX ORDER — SODIUM CHLORIDE, SODIUM LACTATE, POTASSIUM CHLORIDE, CALCIUM CHLORIDE 600; 310; 30; 20 MG/100ML; MG/100ML; MG/100ML; MG/100ML
1000 INJECTION, SOLUTION INTRAVENOUS CONTINUOUS
Status: DISCONTINUED | OUTPATIENT
Start: 2018-10-24 | End: 2018-10-24 | Stop reason: HOSPADM

## 2018-10-24 RX ORDER — LIDOCAINE HYDROCHLORIDE 20 MG/ML
INJECTION, SOLUTION INFILTRATION; PERINEURAL AS NEEDED
Status: DISCONTINUED | OUTPATIENT
Start: 2018-10-24 | End: 2018-10-24 | Stop reason: SURG

## 2018-10-24 RX ORDER — METOCLOPRAMIDE HYDROCHLORIDE 5 MG/ML
5 INJECTION INTRAMUSCULAR; INTRAVENOUS
Status: DISCONTINUED | OUTPATIENT
Start: 2018-10-24 | End: 2018-10-24 | Stop reason: HOSPADM

## 2018-10-24 RX ORDER — ACETAMINOPHEN 500 MG
1000 TABLET ORAL ONCE
Status: COMPLETED | OUTPATIENT
Start: 2018-10-24 | End: 2018-10-24

## 2018-10-24 RX ORDER — SODIUM CHLORIDE 0.9 % (FLUSH) 0.9 %
1-10 SYRINGE (ML) INJECTION AS NEEDED
Status: DISCONTINUED | OUTPATIENT
Start: 2018-10-24 | End: 2018-10-24 | Stop reason: HOSPADM

## 2018-10-24 RX ORDER — ONDANSETRON 2 MG/ML
INJECTION INTRAMUSCULAR; INTRAVENOUS AS NEEDED
Status: DISCONTINUED | OUTPATIENT
Start: 2018-10-24 | End: 2018-10-24 | Stop reason: SURG

## 2018-10-24 RX ORDER — PHENYLEPHRINE HCL IN 0.9% NACL 0.8MG/10ML
SYRINGE (ML) INTRAVENOUS AS NEEDED
Status: DISCONTINUED | OUTPATIENT
Start: 2018-10-24 | End: 2018-10-24 | Stop reason: SURG

## 2018-10-24 RX ORDER — IBUPROFEN 600 MG/1
600 TABLET ORAL ONCE AS NEEDED
Status: DISCONTINUED | OUTPATIENT
Start: 2018-10-24 | End: 2018-10-24 | Stop reason: HOSPADM

## 2018-10-24 RX ORDER — LIDOCAINE HYDROCHLORIDE 40 MG/ML
SOLUTION TOPICAL AS NEEDED
Status: DISCONTINUED | OUTPATIENT
Start: 2018-10-24 | End: 2018-10-24 | Stop reason: SURG

## 2018-10-24 RX ORDER — LABETALOL HYDROCHLORIDE 5 MG/ML
5 INJECTION, SOLUTION INTRAVENOUS
Status: DISCONTINUED | OUTPATIENT
Start: 2018-10-24 | End: 2018-10-24 | Stop reason: HOSPADM

## 2018-10-24 RX ORDER — NALOXONE HCL 0.4 MG/ML
0.4 VIAL (ML) INJECTION AS NEEDED
Status: DISCONTINUED | OUTPATIENT
Start: 2018-10-24 | End: 2018-10-24 | Stop reason: HOSPADM

## 2018-10-24 RX ORDER — SODIUM CHLORIDE 0.9 % (FLUSH) 0.9 %
3 SYRINGE (ML) INJECTION EVERY 12 HOURS SCHEDULED
Status: DISCONTINUED | OUTPATIENT
Start: 2018-10-24 | End: 2018-10-24 | Stop reason: HOSPADM

## 2018-10-24 RX ORDER — MEPERIDINE HYDROCHLORIDE 25 MG/ML
12.5 INJECTION INTRAMUSCULAR; INTRAVENOUS; SUBCUTANEOUS
Status: DISCONTINUED | OUTPATIENT
Start: 2018-10-24 | End: 2018-10-24 | Stop reason: HOSPADM

## 2018-10-24 RX ORDER — OXYCODONE AND ACETAMINOPHEN 7.5; 325 MG/1; MG/1
2 TABLET ORAL ONCE AS NEEDED
Status: DISCONTINUED | OUTPATIENT
Start: 2018-10-24 | End: 2018-10-24 | Stop reason: HOSPADM

## 2018-10-24 RX ADMIN — LIDOCAINE HYDROCHLORIDE 5 ML: 20 INJECTION, SOLUTION EPIDURAL; INFILTRATION; INTRACAUDAL; PERINEURAL at 13:48

## 2018-10-24 RX ADMIN — LIDOCAINE HYDROCHLORIDE 100 MG: 20 INJECTION, SOLUTION INFILTRATION; PERINEURAL at 14:09

## 2018-10-24 RX ADMIN — LIDOCAINE HYDROCHLORIDE 1 EACH: 40 SOLUTION TOPICAL at 14:09

## 2018-10-24 RX ADMIN — IPRATROPIUM BROMIDE AND ALBUTEROL SULFATE 3 ML: 2.5; .5 SOLUTION RESPIRATORY (INHALATION) at 13:50

## 2018-10-24 RX ADMIN — ONDANSETRON HYDROCHLORIDE 4 MG: 2 SOLUTION INTRAMUSCULAR; INTRAVENOUS at 14:17

## 2018-10-24 RX ADMIN — ACETAMINOPHEN 1000 MG: 500 TABLET, FILM COATED ORAL at 13:50

## 2018-10-24 RX ADMIN — Medication 80 MCG: at 14:26

## 2018-10-24 RX ADMIN — ROCURONIUM BROMIDE 5 MG: 10 INJECTION INTRAVENOUS at 14:09

## 2018-10-24 RX ADMIN — SUCCINYLCHOLINE CHLORIDE 100 MG: 20 INJECTION, SOLUTION INTRAMUSCULAR; INTRAVENOUS at 14:09

## 2018-10-24 RX ADMIN — DEXAMETHASONE SODIUM PHOSPHATE 4 MG: 4 INJECTION, SOLUTION INTRA-ARTICULAR; INTRALESIONAL; INTRAMUSCULAR; INTRAVENOUS; SOFT TISSUE at 13:51

## 2018-10-24 RX ADMIN — LIDOCAINE HYDROCHLORIDE 0.5 ML: 10 INJECTION, SOLUTION EPIDURAL; INFILTRATION; INTRACAUDAL; PERINEURAL at 12:33

## 2018-10-24 RX ADMIN — SODIUM CHLORIDE, POTASSIUM CHLORIDE, SODIUM LACTATE AND CALCIUM CHLORIDE 1000 ML: 600; 310; 30; 20 INJECTION, SOLUTION INTRAVENOUS at 12:33

## 2018-10-24 RX ADMIN — Medication 80 MCG: at 14:17

## 2018-10-24 RX ADMIN — PROPOFOL 150 MG: 10 INJECTION, EMULSION INTRAVENOUS at 14:09

## 2018-10-24 NOTE — ANESTHESIA PREPROCEDURE EVALUATION
Anesthesia Evaluation     Patient summary reviewed   no history of anesthetic complications:  NPO Solid Status: > 8 hours  NPO Liquid Status: > 4 hours           Airway   Mallampati: I  TM distance: >3 FB  Neck ROM: full  Dental          Pulmonary - normal exam   (+) a smoker (quit 2003) Former, COPD mild,   (-) asthma, recent URI, sleep apnea    ROS comment: RUL Mass  Cardiovascular - normal exam  Exercise tolerance: good (4-7 METS)    ECG reviewed  Patient on routine beta blocker and Beta blocker given within 24 hours of surgery  Rhythm: regular  Rate: normal    (+) hypertension well controlled,   (-) pacemaker, past MI, angina, cardiac stents      Neuro/Psych  (-) seizures, TIA, CVA  GI/Hepatic/Renal/Endo    (+)  GERD well controlled,    (-) liver disease, no renal disease, diabetes, hypothyroidism, hyperthyroidism    Musculoskeletal     Abdominal    Substance History      OB/GYN          Other        ROS/Med Hx Other: Blind in left eye, due to injury at age 8 yrs                Anesthesia Plan    ASA 2     MAC     intravenous induction   Anesthetic plan, all risks, benefits, and alternatives have been provided, discussed and informed consent has been obtained with: patient.

## 2018-10-24 NOTE — ANESTHESIA POSTPROCEDURE EVALUATION
Patient: Karoline Prater    Procedure Summary     Date:  10/24/18 Room / Location:   [facility] /  [facility]    Anesthesia Start:  1407 Anesthesia Stop:  1438    Procedure:  BRONCHOSCOPY WITH BIOPSY, EBUS (N/A Bronchus) Diagnosis:  (LUNG MASS / MEDIASTINAL ADENOPATHY)    Surgeon:  Gareth Becerra MD Provider:  Alexis Messer CRNA    Anesthesia Type:  MAC ASA Status:  2          Anesthesia Type: MAC  Last vitals  BP   136/70 (10/24/18 1550)   Temp   98.4 °F (36.9 °C) (10/24/18 1455)   Pulse   78 (10/24/18 1550)   Resp   16 (10/24/18 1550)     SpO2   94 % (10/24/18 1550)     Post Anesthesia Care and Evaluation    Patient location during evaluation: PACU  Patient participation: complete - patient participated  Level of consciousness: awake and alert  Pain management: adequate  Airway patency: patent  Anesthetic complications: No anesthetic complications    Cardiovascular status: acceptable  Respiratory status: acceptable  Hydration status: acceptable    Comments: Blood pressure 136/70, pulse 78, temperature 98.4 °F (36.9 °C), temperature source Temporal Artery , resp. rate 16, SpO2 94 %.    Pt discharged from PACU based on cyn score >8

## 2018-10-26 LAB
BACTERIA SPEC RESP CULT: NORMAL
GRAM STN SPEC: NORMAL

## 2018-10-31 LAB
CYTO UR: NORMAL
LAB AP CASE REPORT: NORMAL
Lab: NORMAL
PATH REPORT.FINAL DX SPEC: NORMAL
PATH REPORT.GROSS SPEC: NORMAL

## 2018-11-01 ENCOUNTER — OFFICE VISIT (OUTPATIENT)
Dept: PULMONOLOGY | Facility: CLINIC | Age: 78
End: 2018-11-01

## 2018-11-01 VITALS
RESPIRATION RATE: 16 BRPM | HEART RATE: 76 BPM | SYSTOLIC BLOOD PRESSURE: 138 MMHG | WEIGHT: 185 LBS | DIASTOLIC BLOOD PRESSURE: 88 MMHG | OXYGEN SATURATION: 94 % | BODY MASS INDEX: 31.58 KG/M2 | HEIGHT: 64 IN

## 2018-11-01 DIAGNOSIS — R06.02 SHORTNESS OF BREATH: ICD-10-CM

## 2018-11-01 DIAGNOSIS — I42.8 CARDIOMYOPATHY, NONISCHEMIC (HCC): ICD-10-CM

## 2018-11-01 DIAGNOSIS — R91.8 MASS OF UPPER LOBE OF RIGHT LUNG: ICD-10-CM

## 2018-11-01 DIAGNOSIS — D47.2 MONOCLONAL GAMMOPATHY OF UNKNOWN SIGNIFICANCE (MGUS): ICD-10-CM

## 2018-11-01 DIAGNOSIS — R59.0 MEDIASTINAL ADENOPATHY: Primary | ICD-10-CM

## 2018-11-01 PROCEDURE — 99214 OFFICE O/P EST MOD 30 MIN: CPT | Performed by: INTERNAL MEDICINE

## 2018-11-01 NOTE — PATIENT INSTRUCTIONS
Flexible Bronchoscopy  Bronchoscopy is a procedure used to examine the passageways in the lungs. During the procedure a thin, flexible tool with a lens and camera or eyepiece is passed in your mouth or nose, down the windpipe (trachea), and into the air tubes (bronchi). This tool allows your health care provider to carefully look at your lungs from the inside and take diagnostic samples if needed.  Tell a health care provider about:  · Allergies to food or medicine.  · All medicines you are taking, including blood thinners, vitamins, herbs, eye drops, creams, and over-the-counter medicines.  · Any problems you or family members have had with anesthetic medicines.  · Any blood disorders you have.  · Any surgeries you have had.  · Medical conditions you have, including heart disease, diabetes, or kidney problems.  · Possibility of pregnancy, if this applies.  What are the risks?  Generally, this is a safe procedure. However, as with any procedure, problems can occur. Possible problems include:  · Collapsed lung (pneumothorax).  · Bleeding.  · Increased need for oxygen or difficulty breathing after the procedure.    What happens before the procedure?  Do not eat or drink anything after midnight on the night before the procedure or as directed by your health care provider.  What happens during the procedure?  · Relax as much as possible during the procedure.  · Medicines may be given to relax you, dry up your secretions, and control coughing.  · A numbing medicine (local anesthetic) will be given to numb your mouth, nose, throat, and voice box (larynx). You will be able to breathe normally during the procedure.  · Samples of airway secretions may be collected for testing.  · If abnormal areas are seen in your airways, tissue samples may be taken for examination under a microscope (biopsy).  · If tissue samples are needed from the outer portions of the lung, a type of X-ray called fluoroscopy may be done.  · If bleeding  occurs, a drug may be used to stop or decrease the bleeding.  What happens after the procedure?  · You may receive a chest X-ray following the procedure. This is to make sure the lungs have not collapsed (pneumothorax).  This information is not intended to replace advice given to you by your health care provider. Make sure you discuss any questions you have with your health care provider.  Document Released: 12/15/2001 Document Revised: 05/25/2017 Document Reviewed: 08/22/2014  [employer] Interactive Patient Education © 2017 [employer]

## 2018-11-01 NOTE — PROGRESS NOTES
Subjective   F MALVIN Prater is a 78 y.o. male.     Chief Complaint   Patient presents with   • Adenopathy     Mediastinal        History of Present Illness   He follows up following bronchoscopy with EBUS and mediastinal node sampling to evaluate subcarinal adenopathy and the airways which were clear.  Pathology is reviewed and negative for malignant disease.  Pt continues to have lesion in posterior segment of RUL which could not be diagnosed further.  He is blind in left eye.  He denies increasing shortness of breath or chest pain today.  The lung mass and adenopathy is asymptomatic in the chest for an undetermined period of time and is not improving, with no aggravating or alleviating factors or other associated symptoms.  We did not see any endobronchial lesions at the time of bronchoscopy.    Medical History   has a past medical history of Blindness of left eye; GERD (gastroesophageal reflux disease); Hearing loss; Hypertension; Lung mass; and Shortness of breath.  family history is not on file.   reports that he quit smoking about 15 years ago. He has never used smokeless tobacco. He reports that he does not drink alcohol or use drugs.  Review of Systems   Constitutional: Negative for fever and unexpected weight gain.   Respiratory: Negative for wheezing.    Cardiovascular: Negative for leg swelling.   Gastrointestinal: Negative for vomiting.     Current Outpatient Prescriptions:   •  aspirin 81 MG chewable tablet, Chew 81 mg Daily., Disp: , Rfl:   •  atenolol (TENORMIN) 100 MG tablet, Take 100 mg by mouth., Disp: , Rfl:   •  ferrous sulfate 325 (65 FE) MG tablet, Take 325 mg by mouth Daily With Breakfast., Disp: , Rfl:   •  furosemide (LASIX) 20 MG tablet, Take 20 mg by mouth., Disp: , Rfl:   •  lisinopril (PRINIVIL,ZESTRIL) 10 MG tablet, Take 10 mg by mouth., Disp: , Rfl:   •  Multiple Vitamins-Minerals (MULTIVITAMIN WITH MINERALS) tablet tablet, Take 1 tablet by mouth Daily., Disp: , Rfl:    ------------------------------------  Objective     Physical Exam   Constitutional: He appears well-developed and well-nourished.  Non-toxic appearance. He does not have a sickly appearance. He does not appear ill. No distress.   HENT:   Head: Normocephalic and atraumatic.   Nose: Nose normal.   Left eye absent   Eyes: Conjunctivae and EOM are normal. Left eye exhibits no discharge. No scleral icterus.   Neck: Neck supple.   Cardiovascular: Normal rate, regular rhythm, S1 normal and S2 normal.    Pulmonary/Chest: Effort normal. No accessory muscle usage. No tachypnea. He has no decreased breath sounds. He has no wheezes. He has no rales.   Abdominal: Soft. He exhibits no distension. There is no tenderness. There is no guarding.   Musculoskeletal: He exhibits no deformity.   Lymphadenopathy:     He has no cervical adenopathy.   Neurological: He is alert.   Skin: Skin is warm and dry. No rash noted.   Psychiatric: He has a normal mood and affect.     ------------------------------------  Assessment/Plan   F was seen today for adenopathy.    Diagnoses and all orders for this visit:    Mediastinal adenopathy  -     Ambulatory Referral to Pulmonology    Cardiomyopathy, nonischemic (CMS/HCC)    Mass of upper lobe of right lung  -     Ambulatory Referral to Pulmonology    Shortness of breath    Monoclonal gammopathy of unknown significance (MGUS)      Patient's Body mass index is 31.76 kg/m². BMI is above normal parameters. Recommendations include: no follow-up required and He is at risk for having a condition that will lead to significant cachexia and so that this issue is not necessary to evaluate this time.    He is here following bronchoscopy.  We reviewed the pathology.  This just showed a lot of lymphocytes from the subcarinal node which we sampled.  His intermittent shortness of breath and history of cardiomyopathy although he tolerated anesthesia very well for us 1 week did his procedure.  Monoclonal gammopathy  noted and is truly of uncertain significance and probably not contributing to the process in the lungs other than if he were to have an indolent infection causing all of this.  However the primary concern remains a lung tumor.  This is not approachable by needle sampling due to position of the scapula.  We have done a bronchoscopy with EBUS showing clear airways and negative cytology.  There is clearly a lesion somewhat distal in the airways in the right upper lobe which could be amenable either to navigational bronchoscopy or to an advanced nevus procedure, neither of which are available here.  We will refer him to interventional pulmonology is [place].  Patient is aware of the nature of this and the need for such to determine additional tissue.  The only other alternative would be surgical resection but on reluctant to recommend that not knowing what we're treating

## 2018-11-30 ENCOUNTER — TELEPHONE (OUTPATIENT)
Dept: PULMONOLOGY | Facility: CLINIC | Age: 78
End: 2018-11-30

## 2018-12-03 ENCOUNTER — TELEPHONE (OUTPATIENT)
Dept: PULMONOLOGY | Facility: CLINIC | Age: 78
End: 2018-12-03

## 2018-12-03 DIAGNOSIS — R19.00 ABDOMINAL MASS, UNSPECIFIED ABDOMINAL LOCATION: Primary | ICD-10-CM

## 2018-12-03 NOTE — TELEPHONE ENCOUNTER
PER DR. RAPHAEL:        Pt had a PET scan at [facility] where he was diagnosed with lung cancer.  PET showed significant abnormality in duodenum and small bowel.  Set up referral to gi for evaluation.  PET scan report was sent to me and I have the disc.        I CALLED THE PT AND PREFERS A REFERRAL TO GI  AT [facility].

## 2018-12-03 NOTE — TELEPHONE ENCOUNTER
PET scan and Path report received from Dr. Polanco following navigational bronch.    Findings: navigational lung bx: adenocarcinoma.  Lymph nodes all benign (same as my findings at [facility] here)    PET: RUL mass suv 20.4, mediastinal nodes suv 6 (benign path on sampling, possibly granulomatous), duodenal mass suv 33.1, small bowel loop suv 9.1    Discussed with pt and Dr. Polanco and Dr. Michele.  Pt has seen Dr. Somers many years ago for a prior problem.  Question is whether resectable if nodes benign and abd process unrelated to lung process.  Spirometry is acceptable but dlco <50%predicted.    Plan to follow up with Dr. Michele as scheduled, refer to GI for eval.  If gi process not metastatic disease then consider for surgical eval.  Will fax reports to Reuben Lewis and Yonis.    Electronically signed by Gareth Becerra MD, 12/3/2018, 5:05 PM

## 2018-12-04 NOTE — TELEPHONE ENCOUNTER
Can you please see if pt would like to come into office on morning of Wed Dec 5 or morning of Thur dec 6?    Thank you

## 2018-12-05 LAB
FUNGUS WND CULT: NORMAL
MYCOBACTERIUM SPEC CULT: NORMAL
NIGHT BLUE STAIN TISS: NORMAL
NIGHT BLUE STAIN TISS: NORMAL

## 2018-12-06 ENCOUNTER — OFFICE VISIT (OUTPATIENT)
Dept: GASTROENTEROLOGY | Facility: CLINIC | Age: 78
End: 2018-12-06

## 2018-12-06 VITALS
HEART RATE: 78 BPM | BODY MASS INDEX: 30.59 KG/M2 | SYSTOLIC BLOOD PRESSURE: 122 MMHG | OXYGEN SATURATION: 98 % | WEIGHT: 179.2 LBS | TEMPERATURE: 94.9 F | DIASTOLIC BLOOD PRESSURE: 68 MMHG | HEIGHT: 64 IN

## 2018-12-06 DIAGNOSIS — R63.4 WEIGHT LOSS: ICD-10-CM

## 2018-12-06 DIAGNOSIS — R94.8 ABNORMAL GASTROINTESTINAL PET SCAN: Primary | ICD-10-CM

## 2018-12-06 PROCEDURE — 99204 OFFICE O/P NEW MOD 45 MIN: CPT | Performed by: NURSE PRACTITIONER

## 2018-12-06 NOTE — PROGRESS NOTES
Chief Complaint   Patient presents with   • Endoscopy     pt states last endo was in 2003 , unsure where       Subjective     HPI    Recent dx of lung cancer after PET scan at [facility].  PET scan showed significant abnormality in duodenum and small bowel (reviewed under media tab in epic). He is being followed by Dr Becerra and has undergone bronchoscopy.  Hx of acid reflux.  Controlled with nightly use of vinegar.  No dysphagia.   Reports appetite loss when he started taking iron pill 2 weeks ago.  He has RLQ abdominal pain.  He experienced constipation when he started iron pill.  Use of apple cider vinegar and raisins help facilitate BM.  He has bloating that is made worse with eating.   New dx of anemia in August 2018.  Denies BRBPR, no melena.  He [id number] lb in July 2018.      MRI 10/9/2018 (epic)-. 4.1 x 3.4 cm soft tissue mass in the subhepatic space, abutting the  second portion the duodenum with mild displacement of the duodenum. The  mass is probably separate from the duodenum. The mass shows mild  enhancement and also contains some coarse calcifications. There is no  involvement of the pancreas and the pancreas appears within normal  limits. Differential possibilities include a desmoid tumor, less likely  leiomyoma of the wall of the duodenum or malignancy. Consider further  evaluation with endoscopic ultrasound and biopsy of this mass.    Colonoscopy ([place], [place])-last one apx 2 yrs ago, hx of polyps during first cscope,  EGD 2004 (Dr Somers) - per pt, he was referred to Dr High     Past Medical History:   Diagnosis Date   • Blindness of left eye    • GERD (gastroesophageal reflux disease)    • Hearing loss    • Hypertension    • Lung mass    • Shortness of breath        Past Surgical History:   Procedure Laterality Date   • CHOLECYSTECTOMY     • EYE SURGERY Left    • ORTHOPEDIC SURGERY         Outpatient Medications Marked as Taking for the 12/6/18 encounter (Office Visit) with Zeke Davison  MARITZA Andrew   Medication Sig Dispense Refill   • aspirin 81 MG chewable tablet Chew 81 mg Daily.     • atenolol (TENORMIN) 100 MG tablet Take 100 mg by mouth.     • ferrous sulfate 325 (65 FE) MG tablet Take 325 mg by mouth Daily With Breakfast.     • furosemide (LASIX) 20 MG tablet Take 20 mg by mouth.     • lisinopril (PRINIVIL,ZESTRIL) 10 MG tablet Take 10 mg by mouth.     • Multiple Vitamins-Minerals (MULTIVITAMIN WITH MINERALS) tablet tablet Take 1 tablet by mouth Daily.         Allergies   Allergen Reactions   • Penicillins Hives       Social History     Socioeconomic History   • Marital status: Single     Spouse name: Not on file   • Number of children: Not on file   • Years of education: Not on file   • Highest education level: Not on file   Social Needs   • Financial resource strain: Not on file   • Food insecurity - worry: Not on file   • Food insecurity - inability: Not on file   • Transportation needs - medical: Not on file   • Transportation needs - non-medical: Not on file   Occupational History   • Not on file   Tobacco Use   • Smoking status: Former Smoker     Last attempt to quit: 10/29/2003     Years since quitting: 15.1   • Smokeless tobacco: Never Used   Substance and Sexual Activity   • Alcohol use: No   • Drug use: No   • Sexual activity: Defer   Other Topics Concern   • Not on file   Social History Narrative   • Not on file       History reviewed. No pertinent family history.    Review of Systems   Constitutional: Negative for fatigue, fever and unexpected weight change.   HENT: Negative for hearing loss, sore throat and voice change.    Eyes: Negative for visual disturbance.   Respiratory: Negative for cough, shortness of breath and wheezing.    Cardiovascular: Negative for chest pain and palpitations.   Gastrointestinal: Negative for abdominal pain, blood in stool and vomiting.   Endocrine: Negative for polydipsia and polyuria.   Genitourinary: Negative for difficulty urinating, dysuria,  "hematuria and urgency.   Musculoskeletal: Negative for joint swelling and myalgias.   Skin: Negative for color change, rash and wound.   Neurological: Negative for dizziness, tremors, seizures and syncope.   Hematological: Does not bruise/bleed easily.   Psychiatric/Behavioral: Negative for agitation and confusion. The patient is not nervous/anxious.        Objective     Vitals:    12/06/18 0913   BP: 122/68   Pulse: 78   Temp: 94.9 °F (34.9 °C)   SpO2: 98%   Weight: 81.3 kg (179 lb 3.2 oz)   Height: 162.6 cm (64\")     Body mass index is 30.76 kg/m².    Physical Exam   Constitutional: He is oriented to person, place, and time. He appears well-developed and well-nourished. He is cooperative.   HENT:   Head: Normocephalic and atraumatic.   Eyes: Conjunctivae are normal. Pupils are equal, round, and reactive to light. No scleral icterus.   Neck: Normal range of motion. Neck supple. No JVD present. No thyroid mass and no thyromegaly present.   Cardiovascular: Normal rate, regular rhythm and normal heart sounds. Exam reveals no gallop and no friction rub.   No murmur heard.  Pulmonary/Chest: Effort normal and breath sounds normal. No accessory muscle usage. No respiratory distress. He has no wheezes. He has no rales.   Abdominal: Soft. Normal appearance and bowel sounds are normal. He exhibits no distension, no ascites and no mass. There is no hepatosplenomegaly. There is no tenderness. There is no rebound and no guarding.   Musculoskeletal: Normal range of motion. He exhibits no edema or tenderness.     Vascular Status -  His right foot exhibits normal foot vasculature  and no edema. His left foot exhibits normal foot vasculature  and no edema.  Lymphadenopathy:     He has no cervical adenopathy.   Neurological: He is alert and oriented to person, place, and time. He has normal strength. Gait normal.   Skin: Skin is warm, dry and intact. No rash noted.       Imaging Results (most recent)     None          Body mass " index is 30.76 kg/m².    Assessment/Plan     Karoline was seen today for endoscopy.    Diagnoses and all orders for this visit:    Abnormal gastrointestinal PET scan  -     Case Request; Standing  -     Implement Anesthesia Orders Day of Procedure; Standing  -     Obtain Informed Consent; Standing  -     Case Request    Weight loss        ESOPHAGOGASTRODUODENOSCOPY WITH ANESTHESIA (N/A)    Obtain previous EUS by Dr High-unsure if I will be able to obtain office note from 2004 procedure  Case has been reviewed with Dr Somers  Will request cscope from [place], [place]  PET scan reviewed with pt    Advised pt to stop ASA, use of NSAIDs, Fish Oil, and MV 5 days prior to procedure, per Dr Somers protocol.  Tylenol based products are ok to take.  Pt verbalized understanding.    Patient is to continue all blood pressure and cardiac medications prior to procedure and has been advised to take medications morning of procedure   Pt verbalized understanding    The risk of the endoscopy were discussed in detail.  We discussed the risk of perforation (one out of 5120-2430, riskier with dilation), bleeding (one out of 500), and the rare risks of infection, adverse reaction to anesthesia, respiratory failure, cardiac failure including MI and adverse reaction to medications, etc.  We discussed consequences that could occur if a risk were to develop such as the need for hospitalization, blood transfusion, surgical intervention, medications, pain and disability and death.  Alternatives include not doing anything, or pursuing an UGI series which only offers a diagnosis with potential less accuracy compared to egd.  The patient verbalizes understanding and agrees to proceed.    Patient's Body mass index is 30.76 kg/m². BMI is above normal parameters. Recommendations include: no follow-up required.      There are no Patient Instructions on file for this visit.

## 2018-12-06 NOTE — H&P (VIEW-ONLY)
Chief Complaint   Patient presents with   • Endoscopy     pt states last endo was in 2003 , unsure where       Subjective     HPI    Recent dx of lung cancer after PET scan at [facility].  PET scan showed significant abnormality in duodenum and small bowel (reviewed under media tab in epic). He is being followed by Dr Becerra and has undergone bronchoscopy.  Hx of acid reflux.  Controlled with nightly use of vinegar.  No dysphagia.   Reports appetite loss when he started taking iron pill 2 weeks ago.  He has RLQ abdominal pain.  He experienced constipation when he started iron pill.  Use of apple cider vinegar and raisins help facilitate BM.  He has bloating that is made worse with eating.   New dx of anemia in August 2018.  Denies BRBPR, no melena.  He [id number] lb in July 2018.      MRI 10/9/2018 (epic)-. 4.1 x 3.4 cm soft tissue mass in the subhepatic space, abutting the  second portion the duodenum with mild displacement of the duodenum. The  mass is probably separate from the duodenum. The mass shows mild  enhancement and also contains some coarse calcifications. There is no  involvement of the pancreas and the pancreas appears within normal  limits. Differential possibilities include a desmoid tumor, less likely  leiomyoma of the wall of the duodenum or malignancy. Consider further  evaluation with endoscopic ultrasound and biopsy of this mass.    Colonoscopy ([place], [place])-last one apx 2 yrs ago, hx of polyps during first cscope,  EGD 2004 (Dr Somers) - per pt, he was referred to Dr High     Past Medical History:   Diagnosis Date   • Blindness of left eye    • GERD (gastroesophageal reflux disease)    • Hearing loss    • Hypertension    • Lung mass    • Shortness of breath        Past Surgical History:   Procedure Laterality Date   • CHOLECYSTECTOMY     • EYE SURGERY Left    • ORTHOPEDIC SURGERY         Outpatient Medications Marked as Taking for the 12/6/18 encounter (Office Visit) with Zeke Davison  MARITZA Andrew   Medication Sig Dispense Refill   • aspirin 81 MG chewable tablet Chew 81 mg Daily.     • atenolol (TENORMIN) 100 MG tablet Take 100 mg by mouth.     • ferrous sulfate 325 (65 FE) MG tablet Take 325 mg by mouth Daily With Breakfast.     • furosemide (LASIX) 20 MG tablet Take 20 mg by mouth.     • lisinopril (PRINIVIL,ZESTRIL) 10 MG tablet Take 10 mg by mouth.     • Multiple Vitamins-Minerals (MULTIVITAMIN WITH MINERALS) tablet tablet Take 1 tablet by mouth Daily.         Allergies   Allergen Reactions   • Penicillins Hives       Social History     Socioeconomic History   • Marital status: Single     Spouse name: Not on file   • Number of children: Not on file   • Years of education: Not on file   • Highest education level: Not on file   Social Needs   • Financial resource strain: Not on file   • Food insecurity - worry: Not on file   • Food insecurity - inability: Not on file   • Transportation needs - medical: Not on file   • Transportation needs - non-medical: Not on file   Occupational History   • Not on file   Tobacco Use   • Smoking status: Former Smoker     Last attempt to quit: 10/29/2003     Years since quitting: 15.1   • Smokeless tobacco: Never Used   Substance and Sexual Activity   • Alcohol use: No   • Drug use: No   • Sexual activity: Defer   Other Topics Concern   • Not on file   Social History Narrative   • Not on file       History reviewed. No pertinent family history.    Review of Systems   Constitutional: Negative for fatigue, fever and unexpected weight change.   HENT: Negative for hearing loss, sore throat and voice change.    Eyes: Negative for visual disturbance.   Respiratory: Negative for cough, shortness of breath and wheezing.    Cardiovascular: Negative for chest pain and palpitations.   Gastrointestinal: Negative for abdominal pain, blood in stool and vomiting.   Endocrine: Negative for polydipsia and polyuria.   Genitourinary: Negative for difficulty urinating, dysuria,  "hematuria and urgency.   Musculoskeletal: Negative for joint swelling and myalgias.   Skin: Negative for color change, rash and wound.   Neurological: Negative for dizziness, tremors, seizures and syncope.   Hematological: Does not bruise/bleed easily.   Psychiatric/Behavioral: Negative for agitation and confusion. The patient is not nervous/anxious.        Objective     Vitals:    12/06/18 0913   BP: 122/68   Pulse: 78   Temp: 94.9 °F (34.9 °C)   SpO2: 98%   Weight: 81.3 kg (179 lb 3.2 oz)   Height: 162.6 cm (64\")     Body mass index is 30.76 kg/m².    Physical Exam   Constitutional: He is oriented to person, place, and time. He appears well-developed and well-nourished. He is cooperative.   HENT:   Head: Normocephalic and atraumatic.   Eyes: Conjunctivae are normal. Pupils are equal, round, and reactive to light. No scleral icterus.   Neck: Normal range of motion. Neck supple. No JVD present. No thyroid mass and no thyromegaly present.   Cardiovascular: Normal rate, regular rhythm and normal heart sounds. Exam reveals no gallop and no friction rub.   No murmur heard.  Pulmonary/Chest: Effort normal and breath sounds normal. No accessory muscle usage. No respiratory distress. He has no wheezes. He has no rales.   Abdominal: Soft. Normal appearance and bowel sounds are normal. He exhibits no distension, no ascites and no mass. There is no hepatosplenomegaly. There is no tenderness. There is no rebound and no guarding.   Musculoskeletal: Normal range of motion. He exhibits no edema or tenderness.     Vascular Status -  His right foot exhibits normal foot vasculature  and no edema. His left foot exhibits normal foot vasculature  and no edema.  Lymphadenopathy:     He has no cervical adenopathy.   Neurological: He is alert and oriented to person, place, and time. He has normal strength. Gait normal.   Skin: Skin is warm, dry and intact. No rash noted.       Imaging Results (most recent)     None          Body mass " index is 30.76 kg/m².    Assessment/Plan     Karoline was seen today for endoscopy.    Diagnoses and all orders for this visit:    Abnormal gastrointestinal PET scan  -     Case Request; Standing  -     Implement Anesthesia Orders Day of Procedure; Standing  -     Obtain Informed Consent; Standing  -     Case Request    Weight loss        ESOPHAGOGASTRODUODENOSCOPY WITH ANESTHESIA (N/A)    Obtain previous EUS by Dr High-unsure if I will be able to obtain office note from 2004 procedure  Case has been reviewed with Dr Somers  Will request cscope from [place], [place]  PET scan reviewed with pt    Advised pt to stop ASA, use of NSAIDs, Fish Oil, and MV 5 days prior to procedure, per Dr Somers protocol.  Tylenol based products are ok to take.  Pt verbalized understanding.    Patient is to continue all blood pressure and cardiac medications prior to procedure and has been advised to take medications morning of procedure   Pt verbalized understanding    The risk of the endoscopy were discussed in detail.  We discussed the risk of perforation (one out of 5577-2332, riskier with dilation), bleeding (one out of 500), and the rare risks of infection, adverse reaction to anesthesia, respiratory failure, cardiac failure including MI and adverse reaction to medications, etc.  We discussed consequences that could occur if a risk were to develop such as the need for hospitalization, blood transfusion, surgical intervention, medications, pain and disability and death.  Alternatives include not doing anything, or pursuing an UGI series which only offers a diagnosis with potential less accuracy compared to egd.  The patient verbalizes understanding and agrees to proceed.    Patient's Body mass index is 30.76 kg/m². BMI is above normal parameters. Recommendations include: no follow-up required.      There are no Patient Instructions on file for this visit.

## 2018-12-07 ENCOUNTER — TELEPHONE (OUTPATIENT)
Dept: GASTROENTEROLOGY | Facility: CLINIC | Age: 78
End: 2018-12-07

## 2018-12-07 NOTE — TELEPHONE ENCOUNTER
Called talked to patient he said the only surgery he had was gallbladder in 2003 but he did say dr. Alexis did something about his pancreatis line to his liver?. I have called and asked records to be faxed.also patient said he a infection after gallbladder surgery and dr. Michele referred him here to [facility] and he saw dr. rukhsana walker

## 2018-12-07 NOTE — TELEPHONE ENCOUNTER
Can we please obtain last cscope    Pt states he had performed in [facility] 2 yr ago    ty

## 2018-12-11 ENCOUNTER — ANESTHESIA EVENT (OUTPATIENT)
Dept: GASTROENTEROLOGY | Facility: HOSPITAL | Age: 78
End: 2018-12-11

## 2018-12-11 ENCOUNTER — TELEPHONE (OUTPATIENT)
Dept: GASTROENTEROLOGY | Facility: CLINIC | Age: 78
End: 2018-12-11

## 2018-12-11 ENCOUNTER — ANESTHESIA (OUTPATIENT)
Dept: GASTROENTEROLOGY | Facility: HOSPITAL | Age: 78
End: 2018-12-11

## 2018-12-11 ENCOUNTER — HOSPITAL ENCOUNTER (OUTPATIENT)
Facility: HOSPITAL | Age: 78
Setting detail: HOSPITAL OUTPATIENT SURGERY
Discharge: HOME | End: 2018-12-11
Attending: INTERNAL MEDICINE | Admitting: INTERNAL MEDICINE

## 2018-12-11 VITALS
HEART RATE: 79 BPM | TEMPERATURE: 98.1 F | WEIGHT: 176 LBS | OXYGEN SATURATION: 98 % | DIASTOLIC BLOOD PRESSURE: 86 MMHG | BODY MASS INDEX: 30.05 KG/M2 | HEIGHT: 64 IN | RESPIRATION RATE: 15 BRPM | SYSTOLIC BLOOD PRESSURE: 119 MMHG

## 2018-12-11 DIAGNOSIS — R94.8 ABNORMAL GASTROINTESTINAL PET SCAN: ICD-10-CM

## 2018-12-11 PROCEDURE — 87081 CULTURE SCREEN ONLY: CPT | Performed by: INTERNAL MEDICINE

## 2018-12-11 PROCEDURE — 88305 TISSUE EXAM BY PATHOLOGIST: CPT | Performed by: INTERNAL MEDICINE

## 2018-12-11 PROCEDURE — 43239 EGD BIOPSY SINGLE/MULTIPLE: CPT | Performed by: INTERNAL MEDICINE

## 2018-12-11 PROCEDURE — 25010000002 PROPOFOL 10 MG/ML EMULSION: Performed by: NURSE ANESTHETIST, CERTIFIED REGISTERED

## 2018-12-11 RX ORDER — PROPOFOL 10 MG/ML
VIAL (ML) INTRAVENOUS AS NEEDED
Status: DISCONTINUED | OUTPATIENT
Start: 2018-12-11 | End: 2018-12-11 | Stop reason: SURG

## 2018-12-11 RX ORDER — SODIUM CHLORIDE 0.9 % (FLUSH) 0.9 %
3 SYRINGE (ML) INJECTION AS NEEDED
Status: DISCONTINUED | OUTPATIENT
Start: 2018-12-11 | End: 2018-12-11 | Stop reason: HOSPADM

## 2018-12-11 RX ORDER — LIDOCAINE HYDROCHLORIDE 20 MG/ML
INJECTION, SOLUTION INFILTRATION; PERINEURAL AS NEEDED
Status: DISCONTINUED | OUTPATIENT
Start: 2018-12-11 | End: 2018-12-11 | Stop reason: SURG

## 2018-12-11 RX ORDER — SODIUM CHLORIDE 9 MG/ML
500 INJECTION, SOLUTION INTRAVENOUS CONTINUOUS PRN
Status: DISCONTINUED | OUTPATIENT
Start: 2018-12-11 | End: 2018-12-11 | Stop reason: HOSPADM

## 2018-12-11 RX ADMIN — SODIUM CHLORIDE 500 ML: 9 INJECTION, SOLUTION INTRAVENOUS at 11:34

## 2018-12-11 RX ADMIN — LIDOCAINE HYDROCHLORIDE 140 MG: 20 INJECTION, SOLUTION INFILTRATION; PERINEURAL at 12:31

## 2018-12-11 RX ADMIN — PROPOFOL 100 MG: 10 INJECTION, EMULSION INTRAVENOUS at 12:31

## 2018-12-11 RX ADMIN — LIDOCAINE HYDROCHLORIDE 0.5 ML: 10 INJECTION, SOLUTION EPIDURAL; INFILTRATION; INTRACAUDAL; PERINEURAL at 11:34

## 2018-12-11 NOTE — TELEPHONE ENCOUNTER
Talked Dr Michele /patient and every one involved is in agreement of proceeding with a c-scope eval    Re; abd pain    Please touch base with him about when   Send me back a confirmation once you have talked with him and I'll send in a case request

## 2018-12-11 NOTE — TELEPHONE ENCOUNTER
"2003-Lap Mallory, bx of pancreatic head    Procedure performed at [facility] by Dr Alexis    \"noted to be a rock hard mass in the head of the pancreas\"    Pathology-   GB: chronic cholecystitis  Pancreas: active pancreatitis, severe, no histologic evidence of malignancy    2/26/2003-Dr Vishal High-  eval for pancreatic head mass, mass found during cholecystectomy, bx neg.    EUS - post op dx: 3.4 cm pancreatic head mass involving the duodenal wall and multiple adjacent lymph noes    Impression: large pancreatic head mass with associated adenopathy.  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  No biliary obstruction.  Local adenopathy present, worse adenopathy in letty hepatitis region (suspicious for metastatic tumor)    COLONOSCOPY 2014-  Dr Cervantes    cscope incomplete, cecum not visualized  cscope normal to ascending colon      Records sent to be scanned to chart  "

## 2018-12-11 NOTE — ANESTHESIA POSTPROCEDURE EVALUATION
"Patient: Karoline Prater    Procedure Summary     Date:  12/11/18 Room / Location:  [facility] ENDOSCOPY 4 / [facility] ENDOSCOPY    Anesthesia Start:  1226 Anesthesia Stop:  1236    Procedure:  ESOPHAGOGASTRODUODENOSCOPY WITH ANESTHESIA (N/A ) Diagnosis:       Abnormal gastrointestinal PET scan      (Abnormal gastrointestinal PET scan [R94.8])    Surgeon:  Randy Somers DO Provider:  Alexis Shepard CRNA    Anesthesia Type:  general ASA Status:  3          Anesthesia Type: general  Last vitals  BP   129/70 (12/11/18 1119)   Temp   98.1 °F (36.7 °C) (12/11/18 1119)   Pulse   80 (12/11/18 1119)   Resp   20 (12/11/18 1119)     SpO2   98 % (12/11/18 1119)     Post Anesthesia Care and Evaluation    Patient location during evaluation: PACU  Patient participation: complete - patient participated  Level of consciousness: awake and alert  Pain management: adequate  Airway patency: patent  Anesthetic complications: No anesthetic complications    Cardiovascular status: acceptable  Respiratory status: acceptable  Hydration status: acceptable    Comments: Blood pressure 129/70, pulse 80, temperature 98.1 °F (36.7 °C), temperature source Temporal, resp. rate 20, height 162.6 cm (64\"), weight 79.8 kg (176 lb), SpO2 98 %.    Pt discharged from PACU based on cyn score >8      "

## 2018-12-11 NOTE — ANESTHESIA PREPROCEDURE EVALUATION
Anesthesia Evaluation     Patient summary reviewed   no history of anesthetic complications:  NPO Solid Status: > 8 hours             Airway   Mallampati: I  TM distance: >3 FB  Neck ROM: full  Dental    (+) partials        Pulmonary    (+) a smoker (quit 2003) Former, lung cancer, COPD mild,   (-) asthma, recent URI, sleep apnea    ROS comment: RUL Mass  Cardiovascular   Exercise tolerance: good (4-7 METS)    Patient on routine beta blocker and Beta blocker given within 24 hours of surgery    (+) hypertension well controlled,   (-) pacemaker, past MI, angina, cardiac stents      Neuro/Psych  (-) seizures, TIA, CVA  GI/Hepatic/Renal/Endo    (+)  GERD well controlled,    (-) liver disease, no renal disease, diabetes, hypothyroidism, hyperthyroidism    Musculoskeletal     Abdominal    Substance History      OB/GYN          Other        ROS/Med Hx Other: Blind in left eye, due to injury at age 8 yrs                    Anesthesia Plan    ASA 3     general     intravenous induction   Anesthetic plan, all risks, benefits, and alternatives have been provided, discussed and informed consent has been obtained with: patient.

## 2018-12-12 LAB — UREASE TISS QL: NEGATIVE

## 2018-12-17 ENCOUNTER — PREP FOR SURGERY (OUTPATIENT)
Dept: OTHER | Facility: HOSPITAL | Age: 78
End: 2018-12-17

## 2018-12-17 ENCOUNTER — TELEPHONE (OUTPATIENT)
Dept: GASTROENTEROLOGY | Facility: CLINIC | Age: 78
End: 2018-12-17

## 2018-12-17 DIAGNOSIS — R63.4 WEIGHT LOSS: Primary | ICD-10-CM

## 2018-12-17 LAB
CYTO UR: NORMAL
LAB AP CASE REPORT: NORMAL
LAB AP CLINICAL INFORMATION: NORMAL
PATH REPORT.FINAL DX SPEC: NORMAL
PATH REPORT.GROSS SPEC: NORMAL

## 2019-01-03 ENCOUNTER — ANESTHESIA (OUTPATIENT)
Dept: GASTROENTEROLOGY | Facility: HOSPITAL | Age: 79
End: 2019-01-03

## 2019-01-03 ENCOUNTER — ANESTHESIA EVENT (OUTPATIENT)
Dept: GASTROENTEROLOGY | Facility: HOSPITAL | Age: 79
End: 2019-01-03

## 2019-01-03 ENCOUNTER — HOSPITAL ENCOUNTER (OUTPATIENT)
Facility: HOSPITAL | Age: 79
Setting detail: HOSPITAL OUTPATIENT SURGERY
Discharge: HOME | End: 2019-01-03
Attending: INTERNAL MEDICINE | Admitting: INTERNAL MEDICINE

## 2019-01-03 VITALS
HEIGHT: 64 IN | RESPIRATION RATE: 16 BRPM | OXYGEN SATURATION: 97 % | WEIGHT: 170 LBS | BODY MASS INDEX: 29.02 KG/M2 | SYSTOLIC BLOOD PRESSURE: 113 MMHG | TEMPERATURE: 97.2 F | DIASTOLIC BLOOD PRESSURE: 69 MMHG | HEART RATE: 75 BPM

## 2019-01-03 DIAGNOSIS — R63.4 WEIGHT LOSS: ICD-10-CM

## 2019-01-03 PROCEDURE — 45385 COLONOSCOPY W/LESION REMOVAL: CPT | Performed by: INTERNAL MEDICINE

## 2019-01-03 PROCEDURE — 88305 TISSUE EXAM BY PATHOLOGIST: CPT | Performed by: INTERNAL MEDICINE

## 2019-01-03 PROCEDURE — 25010000002 PROPOFOL 10 MG/ML EMULSION: Performed by: NURSE ANESTHETIST, CERTIFIED REGISTERED

## 2019-01-03 RX ORDER — SODIUM CHLORIDE 9 MG/ML
100 INJECTION, SOLUTION INTRAVENOUS CONTINUOUS
Status: CANCELLED | OUTPATIENT
Start: 2019-01-03

## 2019-01-03 RX ORDER — SODIUM CHLORIDE 0.9 % (FLUSH) 0.9 %
3-10 SYRINGE (ML) INJECTION AS NEEDED
Status: CANCELLED | OUTPATIENT
Start: 2019-01-03

## 2019-01-03 RX ORDER — SODIUM CHLORIDE 9 MG/ML
500 INJECTION, SOLUTION INTRAVENOUS CONTINUOUS PRN
Status: DISCONTINUED | OUTPATIENT
Start: 2019-01-03 | End: 2019-01-03 | Stop reason: HOSPADM

## 2019-01-03 RX ORDER — SODIUM CHLORIDE 0.9 % (FLUSH) 0.9 %
3 SYRINGE (ML) INJECTION EVERY 12 HOURS SCHEDULED
Status: CANCELLED | OUTPATIENT
Start: 2019-01-03

## 2019-01-03 RX ORDER — PROPOFOL 10 MG/ML
VIAL (ML) INTRAVENOUS AS NEEDED
Status: DISCONTINUED | OUTPATIENT
Start: 2019-01-03 | End: 2019-01-03 | Stop reason: SURG

## 2019-01-03 RX ORDER — LIDOCAINE HYDROCHLORIDE 20 MG/ML
INJECTION, SOLUTION INFILTRATION; PERINEURAL AS NEEDED
Status: DISCONTINUED | OUTPATIENT
Start: 2019-01-03 | End: 2019-01-03 | Stop reason: SURG

## 2019-01-03 RX ORDER — SODIUM CHLORIDE 0.9 % (FLUSH) 0.9 %
3 SYRINGE (ML) INJECTION AS NEEDED
Status: DISCONTINUED | OUTPATIENT
Start: 2019-01-03 | End: 2019-01-03 | Stop reason: HOSPADM

## 2019-01-03 RX ADMIN — PROPOFOL 30 MG: 10 INJECTION, EMULSION INTRAVENOUS at 10:06

## 2019-01-03 RX ADMIN — LIDOCAINE HYDROCHLORIDE 0.5 ML: 10 INJECTION, SOLUTION EPIDURAL; INFILTRATION; INTRACAUDAL; PERINEURAL at 09:11

## 2019-01-03 RX ADMIN — PROPOFOL 70 MG: 10 INJECTION, EMULSION INTRAVENOUS at 10:04

## 2019-01-03 RX ADMIN — SODIUM CHLORIDE: 9 INJECTION, SOLUTION INTRAVENOUS at 10:01

## 2019-01-03 RX ADMIN — PROPOFOL 30 MG: 10 INJECTION, EMULSION INTRAVENOUS at 10:20

## 2019-01-03 RX ADMIN — PROPOFOL 30 MG: 10 INJECTION, EMULSION INTRAVENOUS at 10:09

## 2019-01-03 RX ADMIN — LIDOCAINE HYDROCHLORIDE 80 MG: 20 INJECTION, SOLUTION INFILTRATION; PERINEURAL at 10:04

## 2019-01-03 RX ADMIN — SODIUM CHLORIDE 500 ML: 9 INJECTION, SOLUTION INTRAVENOUS at 09:11

## 2019-01-03 RX ADMIN — PROPOFOL 30 MG: 10 INJECTION, EMULSION INTRAVENOUS at 10:16

## 2019-01-03 RX ADMIN — PROPOFOL 30 MG: 10 INJECTION, EMULSION INTRAVENOUS at 10:13

## 2019-01-03 NOTE — ANESTHESIA PREPROCEDURE EVALUATION
Anesthesia Evaluation     Patient summary reviewed   no history of anesthetic complications:  NPO Solid Status: > 8 hours  NPO Liquid Status: > 4 hours           Airway   Mallampati: I  TM distance: >3 FB  Neck ROM: full  Dental          Pulmonary - normal exam    breath sounds clear to auscultation  (+) a smoker (quit 2003) Former, COPD mild,   (-) asthma, recent URI, sleep apnea    ROS comment: RUL Mass  Cardiovascular - normal exam  Exercise tolerance: good (4-7 METS)    ECG reviewed  Patient on routine beta blocker and Beta blocker given within 24 hours of surgery  Rhythm: regular  Rate: normal    (+) hypertension well controlled,   (-) pacemaker, past MI, angina, cardiac stents      Neuro/Psych  (-) seizures, TIA, CVA  GI/Hepatic/Renal/Endo    (+)  GERD well controlled,    (-) liver disease, no renal disease, diabetes, hypothyroidism, hyperthyroidism    Musculoskeletal     Abdominal    Substance History      OB/GYN          Other        ROS/Med Hx Other: Blind in left eye, due to injury at age 8 yrs                    Anesthesia Plan    ASA 2     general   total IV anesthesia  intravenous induction   Anesthetic plan, all risks, benefits, and alternatives have been provided, discussed and informed consent has been obtained with: patient.

## 2019-01-03 NOTE — ANESTHESIA POSTPROCEDURE EVALUATION
Patient: Karoline Prater    Procedure Summary     Date:  01/03/19 Room / Location:  [facility] ENDOSCOPY 2 / [facility] ENDOS[facility]    Anesthesia Start:  1001 Anesthesia Stop:  1021    Procedure:  COLONOSCOPY WITH ANESTHESIA (N/A ) Diagnosis:       Weight loss      (Weight loss [R63.4])    Surgeon:  Randy Somers DO Provider:  Vijay Talley CRNA    Anesthesia Type:  general ASA Status:  2          Anesthesia Type: general  Last vitals  BP   116/63 (01/03/19 0848)   Temp   97.2 °F (36.2 °C) (01/03/19 0848)   Pulse   87 (01/03/19 0848)   Resp   20 (01/03/19 0848)     SpO2   95 % (01/03/19 0848)     Post Anesthesia Care and Evaluation    Patient location during evaluation: PHASE II  Patient participation: complete - patient participated  Level of consciousness: awake  Pain score: 0  Pain management: adequate  Airway patency: patent  Anesthetic complications: No anesthetic complications  PONV Status: none  Cardiovascular status: acceptable  Respiratory status: acceptable  Hydration status: acceptable

## 2019-01-14 ENCOUNTER — HOSPITAL ENCOUNTER (OUTPATIENT)
Dept: GENERAL RADIOLOGY | Facility: HOSPITAL | Age: 79
Discharge: HOME | End: 2019-01-14
Admitting: NURSE PRACTITIONER

## 2019-01-14 ENCOUNTER — TELEPHONE (OUTPATIENT)
Dept: GASTROENTEROLOGY | Facility: CLINIC | Age: 79
End: 2019-01-14

## 2019-01-14 DIAGNOSIS — R10.10 PAIN OF UPPER ABDOMEN: Primary | ICD-10-CM

## 2019-01-14 DIAGNOSIS — R10.10 PAIN OF UPPER ABDOMEN: ICD-10-CM

## 2019-01-14 PROCEDURE — 74019 RADEX ABDOMEN 2 VIEWS: CPT

## 2019-01-14 NOTE — TELEPHONE ENCOUNTER
Please let pt know abd XR looked ok    Advise him to use Mag Citrate x 1 bottle  Then he needs to use Miralax on regular basis    If no improvement in sx in one week we can consider CT scan but he will need to let us know    ty

## 2019-01-14 NOTE — TELEPHONE ENCOUNTER
Called patient gave him information, he said he had mag citrate and going to drink it now he will also take miralax daily and call with update on Monday.

## 2019-01-17 ENCOUNTER — TELEPHONE (OUTPATIENT)
Dept: GASTROENTEROLOGY | Facility: CLINIC | Age: 79
End: 2019-01-17

## 2019-01-17 NOTE — TELEPHONE ENCOUNTER
patient called and stated he needs some relief from the bad pain in his right side. He did have a bm after he drank the mag  Citrate and he is taking miralax. He said he has had I small bm since  monday.

## 2019-01-17 NOTE — TELEPHONE ENCOUNTER
"I talked to him today about his pain/decreased appetite  I told him about the prev PET scan abd abnormalities and once again he says he can't go out of town/or to Dr High  I talked to Dr Michele he will refer him to Dr Moe for \"local EUS\"    Dr Michele to call him sooner than later    "

## 2019-01-22 ENCOUNTER — OFFICE VISIT (OUTPATIENT)
Dept: CARDIAC SURGERY | Facility: CLINIC | Age: 79
End: 2019-01-22

## 2019-01-22 VITALS
DIASTOLIC BLOOD PRESSURE: 60 MMHG | BODY MASS INDEX: 27.72 KG/M2 | HEIGHT: 64 IN | SYSTOLIC BLOOD PRESSURE: 102 MMHG | OXYGEN SATURATION: 98 % | WEIGHT: 162.4 LBS | HEART RATE: 75 BPM

## 2019-01-22 DIAGNOSIS — R91.8 MASS OF UPPER LOBE OF RIGHT LUNG: Primary | ICD-10-CM

## 2019-01-22 DIAGNOSIS — I42.8 CARDIOMYOPATHY, NONISCHEMIC (HCC): ICD-10-CM

## 2019-01-22 DIAGNOSIS — D47.2 MONOCLONAL GAMMOPATHY OF UNKNOWN SIGNIFICANCE (MGUS): ICD-10-CM

## 2019-01-22 DIAGNOSIS — K86.89 PANCREATIC MASS: ICD-10-CM

## 2019-01-22 DIAGNOSIS — I47.29 NSVT (NONSUSTAINED VENTRICULAR TACHYCARDIA) (HCC): ICD-10-CM

## 2019-01-22 DIAGNOSIS — R59.0 MEDIASTINAL ADENOPATHY: ICD-10-CM

## 2019-01-22 PROCEDURE — 99204 OFFICE O/P NEW MOD 45 MIN: CPT | Performed by: THORACIC SURGERY (CARDIOTHORACIC VASCULAR SURGERY)

## 2019-02-05 PROBLEM — K86.89 PANCREATIC MASS: Status: ACTIVE | Noted: 2019-02-05

## 2019-02-05 NOTE — PROGRESS NOTES
Chief Complaint   Patient presents with   • Lung Cancer     new patient form [facility]         Subjective     History of Present Illness  79 yo male with history of nonischemic cardiomyopathy, shortness of breath, hypertension, known pancreatic mass for ~15 years, with an unintended 15 pound weight loss and a history of previous tobacco use presents with a new lung mass identified late last year.  He denies  hoarseness of voice, chest pain, hemoptysis, history of pneumonia, or cough.  As I understand it and as summarized by Dr. Michele via a separate conversation, he has had a known pancreatic mass for approximately 15 years.  Biopsies (including pancreatic head surgical biopsy - reportedly resected) were inconclusive in 2003 and tumor markers have been normal.  The lesion in question has been followed for multiple years with no significant changes.   With workup of unexplained weight loss, he did undergo further workup identifying anemia and the aforementioned lung mass.    Efforts for CT guided needle biopsy were unsuccessful due to anatomy.  EBUS was nondiagnositic.  Histiocytes were identified.  No endobronchial lesions were found.  Washing were negative.  Dr. 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 Hilar and mediastinal lymph nodes were sampled demonstrating no evidence of malignancy again.    He previously has been seen by Dr. Broadbent for V Tach and cardiomyopathy.  Mr. Prater is unsure as to details of his cardiac status.    He carries a diagnosis of compensated congestive heart failure.   With the above, he is now in my office for the consideration of resection,.      Review of Systems   Constitutional: Positive for activity change, appetite change, fatigue and unexpected weight change. Negative for chills, diaphoresis and fever.   HENT: Negative for dental problem, hearing loss, nosebleeds, sore throat, trouble  swallowing and voice change.    Eyes: Negative for photophobia, redness and visual disturbance.   Respiratory: Positive for shortness of breath. Negative for apnea, cough, chest tightness, wheezing and stridor.    Cardiovascular: Positive for palpitations. Negative for chest pain and leg swelling.   Gastrointestinal: Negative for abdominal distention, abdominal pain, blood in stool, constipation, diarrhea, nausea and vomiting.   Endocrine: Negative for cold intolerance, heat intolerance, polyphagia and polyuria.   Genitourinary: Negative for decreased urine volume, difficulty urinating, dysuria, flank pain, frequency, hematuria and urgency.   Musculoskeletal: Positive for arthralgias and gait problem. Negative for back pain, joint swelling, myalgias and neck pain.   Skin: Negative for pallor, rash and wound.   Allergic/Immunologic: Negative for immunocompromised state.   Neurological: Negative for dizziness, tremors, seizures, syncope, speech difficulty, weakness, light-headedness, numbness and headaches.   Hematological: Positive for adenopathy. Bruises/bleeds easily.   Psychiatric/Behavioral: Negative for confusion, sleep disturbance and suicidal ideas. The patient is not nervous/anxious.     a      Past Medical History:   Diagnosis Date   • Blindness of left eye    • GERD (gastroesophageal reflux disease)    • Hearing loss    • Hypertension    • Shortness of breath      Past Surgical History:   Procedure Laterality Date   • BRONCHOSCOPY N/A 10/24/2018    Procedure: BRONCHOSCOPY WITH BIOPSY, EBUS;  Surgeon: Gareth Becerra MD;  Location: [facility] OR;  Service: Pulmonary   • CHOLECYSTECTOMY     • COLONOSCOPY N/A 1/3/2019    Procedure: COLONOSCOPY WITH ANESTHESIA;  Surgeon: Randy Somers DO;  Location: [facility] ENDOSCOPY;  Service: Gastroenterology   • ENDOSCOPY N/A 12/11/2018    Procedure: ESOPHAGOGASTRODUODENOSCOPY WITH ANESTHESIA;  Surgeon: Randy Somers DO;  Location: [facility] ENDOSCOPY;  Service:  "Gastroenterology   • EYE SURGERY Left    • ORTHOPEDIC SURGERY       History reviewed. No pertinent family history.  Social History     Tobacco Use   • Smoking status: Former Smoker     Types: Cigarettes     Last attempt to quit: 10/29/2003     Years since quitting: 15.2   • Smokeless tobacco: Never Used   Substance Use Topics   • Alcohol use: No   • Drug use: No     Current Outpatient Medications   Medication Sig Dispense Refill   • aspirin 81 MG chewable tablet Chew 81 mg Daily.     • atenolol (TENORMIN) 100 MG tablet Take 100 mg by mouth.     • furosemide (LASIX) 20 MG tablet Take 20 mg by mouth.     • lisinopril (PRINIVIL,ZESTRIL) 10 MG tablet Take 10 mg by mouth.     • Multiple Vitamins-Minerals (MULTIVITAMIN WITH MINERALS) tablet tablet Take 1 tablet by mouth Daily.       No current facility-administered medications for this visit.      Allergies:  Penicillins    Objective      Vital Signs  Visit Vitals  /60 (BP Location: Left arm, Patient Position: Sitting, Cuff Size: Adult)   Pulse 75   Ht 162.6 cm (64\")   Wt 73.7 kg (162 lb 6.4 oz)   SpO2 98%   BMI 27.88 kg/m²         Physical Exam   Constitutional: He is oriented to person, place, and time.   Chronically ill appearing. Stated age   HENT:   Head: Normocephalic and atraumatic.   Mouth/Throat: Oropharynx is clear and moist.   Eyes: EOM are normal. Pupils are equal, round, and reactive to light.   Neck: Normal range of motion. Neck supple. No JVD present. No tracheal deviation present. No thyromegaly present.   Cardiovascular: Normal rate, regular rhythm, normal heart sounds and intact distal pulses. Exam reveals no gallop and no friction rub.   No murmur heard.  Pulmonary/Chest: Effort normal and breath sounds normal. No respiratory distress. He has no wheezes. He has no rales. He exhibits no tenderness.   Abdominal: Soft. He exhibits no distension. There is no tenderness.   Musculoskeletal: Normal range of motion. He exhibits no edema. "   Lymphadenopathy:     He has no cervical adenopathy.   Neurological: He is alert and oriented to person, place, and time. No cranial nerve deficit.   Skin: Skin is warm and dry.   Psychiatric: He has a normal mood and affect.       Results Review:   Addendum     Addendum: The chest CT includes a portion of the upper abdomen, not  described in the original report, there is a soft tissue mass in the  subhepatic space with coarse internal calcifications, the mass measures  approximately 4.0 x 3.9 cm and appears to mildly displace the second  portion of the duodenum medially. The patient also had MRI of the  abdomen with and without contrast today, please review the final report  from that study.  This report was finalized on 10/11/2018 10:46 by Dr. Allen Churchill MD.   Addended by Allen Churchill MD on 10/11/2018 10:46 AM      Study Result     EXAMINATION: CT CHEST WO CONTRAST- 10/11/2018 10:00 AM CDT     HISTORY: Lung nodule, <1cm; J98.4-Other disorders of lung     DOSE: 518 mGycm (Automatic exposure control technique was implemented in  an effort to keep the radiation dose as low as possible without  compromising image quality)     REPORT: Spiral CT of the chest was performed without intravenous  contrast from the thoracic inlet through the upper abdomen.  Reconstructed coronal and sagittal images are also reviewed.     Comparison: CT of the chest with contrast 2/15/2007 review of lung  windows demonstrates a lobular pleural-based mass in the right upper  lobe that measures approximately 5.7 x 3.2 cm, with irregular margins.  This is suspicious for primary lung neoplasm. Soft tissue associated  with tumor appears to extend into the bronchus supplying this portion of  the right upper lobe. The patient was scheduled for CT-guided biopsy of  this mass, however after discussion with Dr. Michele, the biopsy was  canceled, the patient is being sent for bronchoscopy and biopsy. The  patient understands that if the  bronchoscopy and biopsy are  unsuccessful, he may return for CT guided biopsy.  Additional images  were obtained with the patient positioned on his left side to determine  if there was a possible access window to the pleural-based portion of  the mass, however the scapula blocked access.     Mediastinal windows show an enlarged lymph node aorticopulmonary window  measuring 2.1 x 1.1 cm, there are other normal-sized lymph nodes, some  which contain central calcification. There is no obvious hilar  lymphadenopathy on this unenhanced study. There is mild interlobular  septal thickening in the periphery of the lung bases. Scattered  calcified granulomas are present. Other tiny subpleural nodules are  present in the lung bases, with an average size of 3 mm. Hyperinflation  of the lungs compatible with emphysema. There are scattered  emphysematous air cyst. No pleural effusion or pneumothorax is  identified. Heavy coronary calcifications present particularly the LAD  distribution. In the upper abdomen, pneumobilia is present which  probably represents previous sphincterotomy. The gallbladder appears  surgically absent. Small calcifications are seen within the posterior  pancreatic head. There is a nonobstructing nephrolithiasis in the mid  right kidney that measures approximately 4 mm. There is mild bilateral  perinephric fat stranding. Review of bone windows shows degenerative  changes in the lower thoracic and upper lumbar spine with mild thoracic  kyphosis.     IMPRESSION:  1. 5.7 x 3.2 cm mass in the right upper lobe is suspicious for primary  pulmonary neoplasm, with apparent extension into the right upper lobe  bronchus that supplies this portion of the lung. After discussion with  Dr. Michele, the CT-guided biopsy scheduled for today was canceled in  favor of bronchoscopy and biopsy. The patient understands that if the  bronchoscopy and biopsy are nondiagnostic, CT-guided biopsy may still be  necessary.  2. COPD,  with mild interstitial disease, numerous small subpleural  nodules are seen in the lung bases, with an average size of 2 to 3 mm.  3. Evidence of healed granulomatous disease.  4. Small nonobstructing right-sided nephrolithiasis measuring 4 mm.  5. Small coarse calcification in the posterior pancreatic head which may  be related to chronic pancreatitis.        This report was finalized on 10/11/2018 10:12 by Dr. Allen Churchill MD.               I reviewed the patient's new clinical results.  Discussed with patient      Assessment/Plan       Karoline was seen today for lung cancer.    Diagnoses and all orders for this visit:    Mass of upper lobe of right lung  -     Pulmonary Function Test; Future  -     Blood Gas, Arterial; Future  -     CT chest w contrast; Future    Monoclonal gammopathy of unknown significance (MGUS)    NSVT (nonsustained ventricular tachycardia) (CMS/HCC)    Mediastinal adenopathy    Cardiomyopathy, nonischemic (CMS/HCC)    Pancreatic mass          I discussed the patients findings and my recommendations with patient.      We discussed he has been diagnosed with lung cancer.  He has notable adenopathy in the mediastinum that has been negative on EBUS.  We discussed the importance of staging such that best therapy can be selected and portend prognosis accurately.  We discussed options to perform evaluation of the mediastinum including bronchoscopy and cervical mediastinoscopy.   Prior to resection I believe he would benefit from mediastinoscopy given his unintended weight loss that is not insignificant and overall malaise, patient reported decline which maybe consistent with systemic disease.  Given his cardiac history, evaluation is warranted.  His last CT scan of the chest is greater than 3 months.  Will complete his preoperative assessment with hopefully intent for resection.  He is unsure if he is strong enough to undergo resection but is agreeable to assessment and later decision making.  I  did discussed the conduct of mediastinoscopy and thoracic resection.  Questions were answered to the best of my ability.    He is a non smoker.    Patient's Body mass index is 27.88 kg/m². BMI is within normal parameters. No follow-up required..    RTC 2-3 weeks.

## 2019-02-07 ENCOUNTER — LAB REQUISITION (OUTPATIENT)
Dept: LAB | Facility: HOSPITAL | Age: 79
End: 2019-02-07

## 2019-02-07 DIAGNOSIS — Z00.00 ENCOUNTER FOR GENERAL ADULT MEDICAL EXAMINATION WITHOUT ABNORMAL FINDINGS: ICD-10-CM

## 2019-02-07 LAB
ABO GROUP BLD: NORMAL
BLD GP AB SCN SERPL QL: NEGATIVE
RH BLD: POSITIVE
T&S EXPIRATION DATE: NORMAL

## 2019-02-07 PROCEDURE — 86901 BLOOD TYPING SEROLOGIC RH(D): CPT | Performed by: INTERNAL MEDICINE

## 2019-02-07 PROCEDURE — 86850 RBC ANTIBODY SCREEN: CPT | Performed by: INTERNAL MEDICINE

## 2019-02-07 PROCEDURE — 86900 BLOOD TYPING SEROLOGIC ABO: CPT | Performed by: INTERNAL MEDICINE

## 2019-02-08 ENCOUNTER — APPOINTMENT (OUTPATIENT)
Dept: LAB | Facility: HOSPITAL | Age: 79
End: 2019-02-08

## 2019-02-08 ENCOUNTER — INFUSION (OUTPATIENT)
Dept: ONCOLOGY | Facility: HOSPITAL | Age: 79
End: 2019-02-08

## 2019-02-08 VITALS
HEIGHT: 64 IN | WEIGHT: 160 LBS | SYSTOLIC BLOOD PRESSURE: 101 MMHG | HEART RATE: 89 BPM | OXYGEN SATURATION: 97 % | TEMPERATURE: 96.9 F | BODY MASS INDEX: 27.31 KG/M2 | RESPIRATION RATE: 18 BRPM | DIASTOLIC BLOOD PRESSURE: 60 MMHG

## 2019-02-08 DIAGNOSIS — D64.9 SYMPTOMATIC ANEMIA: Primary | ICD-10-CM

## 2019-02-08 LAB
DEPRECATED RDW RBC AUTO: 54.3 FL (ref 40–54)
ERYTHROCYTE [DISTWIDTH] IN BLOOD BY AUTOMATED COUNT: 18.5 % (ref 12–15)
HCT VFR BLD AUTO: 33.4 % (ref 40–52)
HGB BLD-MCNC: 10.4 G/DL (ref 14–18)
MCH RBC QN AUTO: 25.4 PG (ref 28–32)
MCHC RBC AUTO-ENTMCNC: 31.1 G/DL (ref 33–36)
MCV RBC AUTO: 81.5 FL (ref 82–95)
PLATELET # BLD AUTO: 551 10*3/MM3 (ref 130–400)
PMV BLD AUTO: 10.8 FL (ref 6–12)
RBC # BLD AUTO: 4.1 10*6/MM3 (ref 4.8–5.9)
WBC NRBC COR # BLD: 13.36 10*3/MM3 (ref 4.8–10.8)

## 2019-02-08 PROCEDURE — P9016 RBC LEUKOCYTES REDUCED: HCPCS

## 2019-02-08 PROCEDURE — 86901 BLOOD TYPING SEROLOGIC RH(D): CPT

## 2019-02-08 PROCEDURE — 36430 TRANSFUSION BLD/BLD COMPNT: CPT

## 2019-02-08 PROCEDURE — 96375 TX/PRO/DX INJ NEW DRUG ADDON: CPT

## 2019-02-08 PROCEDURE — 25010000002 METHYLPREDNISOLONE PER 125 MG: Performed by: INTERNAL MEDICINE

## 2019-02-08 PROCEDURE — 85027 COMPLETE CBC AUTOMATED: CPT

## 2019-02-08 PROCEDURE — 96365 THER/PROPH/DIAG IV INF INIT: CPT

## 2019-02-08 PROCEDURE — 36415 COLL VENOUS BLD VENIPUNCTURE: CPT

## 2019-02-08 PROCEDURE — 86900 BLOOD TYPING SEROLOGIC ABO: CPT

## 2019-02-08 PROCEDURE — 25010000002 DIPHENHYDRAMINE PER 50 MG: Performed by: INTERNAL MEDICINE

## 2019-02-08 PROCEDURE — 96367 TX/PROPH/DG ADDL SEQ IV INF: CPT

## 2019-02-08 PROCEDURE — 25010000002 FUROSEMIDE PER 20 MG: Performed by: INTERNAL MEDICINE

## 2019-02-08 PROCEDURE — 86920 COMPATIBILITY TEST SPIN: CPT

## 2019-02-08 RX ORDER — FUROSEMIDE 10 MG/ML
20 INJECTION INTRAMUSCULAR; INTRAVENOUS ONCE
Status: COMPLETED | OUTPATIENT
Start: 2019-02-08 | End: 2019-02-08

## 2019-02-08 RX ORDER — METHYLPREDNISOLONE SODIUM SUCCINATE 125 MG/2ML
125 INJECTION, POWDER, LYOPHILIZED, FOR SOLUTION INTRAMUSCULAR; INTRAVENOUS ONCE
Status: CANCELLED
Start: 2019-04-26 | End: 2019-02-08

## 2019-02-08 RX ORDER — DIPHENHYDRAMINE HYDROCHLORIDE 50 MG/ML
25 INJECTION INTRAMUSCULAR; INTRAVENOUS ONCE
Status: DISCONTINUED | OUTPATIENT
Start: 2019-02-08 | End: 2019-02-08 | Stop reason: SDUPTHER

## 2019-02-08 RX ORDER — METHYLPREDNISOLONE SODIUM SUCCINATE 125 MG/2ML
125 INJECTION, POWDER, LYOPHILIZED, FOR SOLUTION INTRAMUSCULAR; INTRAVENOUS EVERY 6 HOURS
Status: DISCONTINUED | OUTPATIENT
Start: 2019-02-08 | End: 2019-03-04

## 2019-02-08 RX ORDER — ACETAMINOPHEN 325 MG/1
650 TABLET ORAL ONCE
Status: CANCELLED
Start: 2019-04-26 | End: 2019-02-08

## 2019-02-08 RX ORDER — SODIUM CHLORIDE 9 MG/ML
250 INJECTION, SOLUTION INTRAVENOUS AS NEEDED
Status: DISCONTINUED | OUTPATIENT
Start: 2019-02-08 | End: 2019-02-08 | Stop reason: HOSPADM

## 2019-02-08 RX ORDER — ACETAMINOPHEN 325 MG/1
650 TABLET ORAL ONCE
Status: COMPLETED | OUTPATIENT
Start: 2019-02-08 | End: 2019-02-08

## 2019-02-08 RX ORDER — FUROSEMIDE 10 MG/ML
20 INJECTION INTRAMUSCULAR; INTRAVENOUS ONCE
Status: CANCELLED
Start: 2019-04-26 | End: 2019-02-08

## 2019-02-08 RX ADMIN — ACETAMINOPHEN 650 MG: 325 TABLET, FILM COATED ORAL at 08:46

## 2019-02-08 RX ADMIN — FUROSEMIDE 20 MG: 10 INJECTION, SOLUTION INTRAVENOUS at 11:57

## 2019-02-08 RX ADMIN — DIPHENHYDRAMINE HYDROCHLORIDE 25 MG: 50 INJECTION, SOLUTION INTRAMUSCULAR; INTRAVENOUS at 08:46

## 2019-02-08 RX ADMIN — METHYLPREDNISOLONE SODIUM SUCCINATE 125 MG: 125 INJECTION, POWDER, FOR SOLUTION INTRAMUSCULAR; INTRAVENOUS at 09:06

## 2019-02-09 LAB
ABO + RH BLD: NORMAL
ABO + RH BLD: NORMAL
BH BB BLOOD EXPIRATION DATE: NORMAL
BH BB BLOOD EXPIRATION DATE: NORMAL
BH BB BLOOD TYPE BARCODE: 5100
BH BB BLOOD TYPE BARCODE: 5100
BH BB DISPENSE STATUS: NORMAL
BH BB DISPENSE STATUS: NORMAL
BH BB PRODUCT CODE: NORMAL
BH BB PRODUCT CODE: NORMAL
BH BB UNIT NUMBER: NORMAL
BH BB UNIT NUMBER: NORMAL
CROSSMATCH INTERPRETATION: NORMAL
CROSSMATCH INTERPRETATION: NORMAL
UNIT  ABO: NORMAL
UNIT  ABO: NORMAL
UNIT  RH: NORMAL
UNIT  RH: NORMAL

## 2019-02-13 ENCOUNTER — TELEPHONE (OUTPATIENT)
Dept: CARDIAC SURGERY | Facility: CLINIC | Age: 79
End: 2019-02-13

## 2019-02-18 ENCOUNTER — DOCUMENTATION (OUTPATIENT)
Dept: CARDIAC SURGERY | Facility: CLINIC | Age: 79
End: 2019-02-18

## 2019-02-18 ENCOUNTER — HOSPITAL ENCOUNTER (OUTPATIENT)
Dept: PULMONOLOGY | Facility: HOSPITAL | Age: 79
Discharge: HOME | End: 2019-02-18

## 2019-02-18 ENCOUNTER — HOSPITAL ENCOUNTER (OUTPATIENT)
Dept: CT IMAGING | Facility: HOSPITAL | Age: 79
Discharge: HOME | End: 2019-02-18
Admitting: THORACIC SURGERY (CARDIOTHORACIC VASCULAR SURGERY)

## 2019-02-18 DIAGNOSIS — R91.8 MASS OF UPPER LOBE OF RIGHT LUNG: ICD-10-CM

## 2019-02-18 DIAGNOSIS — N13.30 HYDRONEPHROSIS, UNSPECIFIED HYDRONEPHROSIS TYPE: Primary | ICD-10-CM

## 2019-02-18 LAB
ARTERIAL PATENCY WRIST A: ABNORMAL
ATMOSPHERIC PRESS: 762 MMHG
BASE EXCESS BLDA CALC-SCNC: -2.8 MMOL/L (ref 0–2)
BDY SITE: ABNORMAL
BODY TEMPERATURE: 37 C
CREAT BLDA-MCNC: 1.6 MG/DL (ref 0.6–1.3)
HCO3 BLDA-SCNC: 21 MMOL/L (ref 20–26)
Lab: ABNORMAL
MODALITY: ABNORMAL
PCO2 BLDA: 31.7 MM HG (ref 35–45)
PH BLDA: 7.43 PH UNITS (ref 7.35–7.45)
PO2 BLDA: 76.8 MM HG (ref 83–108)
SAO2 % BLDCOA: 96.4 % (ref 94–99)
VENTILATOR MODE: ABNORMAL

## 2019-02-18 PROCEDURE — 82565 ASSAY OF CREATININE: CPT

## 2019-02-18 PROCEDURE — 94729 DIFFUSING CAPACITY: CPT

## 2019-02-18 PROCEDURE — 36600 WITHDRAWAL OF ARTERIAL BLOOD: CPT

## 2019-02-18 PROCEDURE — 71260 CT THORAX DX C+: CPT

## 2019-02-18 PROCEDURE — 94060 EVALUATION OF WHEEZING: CPT

## 2019-02-18 PROCEDURE — 94727 GAS DIL/WSHOT DETER LNG VOL: CPT | Performed by: INTERNAL MEDICINE

## 2019-02-18 PROCEDURE — 94729 DIFFUSING CAPACITY: CPT | Performed by: INTERNAL MEDICINE

## 2019-02-18 PROCEDURE — 25010000002 IOPAMIDOL 61 % SOLUTION: Performed by: THORACIC SURGERY (CARDIOTHORACIC VASCULAR SURGERY)

## 2019-02-18 PROCEDURE — 94727 GAS DIL/WSHOT DETER LNG VOL: CPT

## 2019-02-18 PROCEDURE — 82803 BLOOD GASES ANY COMBINATION: CPT

## 2019-02-18 PROCEDURE — 94060 EVALUATION OF WHEEZING: CPT | Performed by: INTERNAL MEDICINE

## 2019-02-18 RX ORDER — ALBUTEROL SULFATE 2.5 MG/3ML
2.5 SOLUTION RESPIRATORY (INHALATION) ONCE
Status: COMPLETED | OUTPATIENT
Start: 2019-02-18 | End: 2019-02-18

## 2019-02-18 RX ADMIN — IOPAMIDOL 100 ML: 612 INJECTION, SOLUTION INTRAVENOUS at 14:12

## 2019-02-18 RX ADMIN — ALBUTEROL SULFATE 2.5 MG: 2.5 SOLUTION RESPIRATORY (INHALATION) at 15:28

## 2019-02-18 NOTE — PROGRESS NOTES
Received call from Dr. Turpin re: new finding of left hydronephrosis present on CT scan of the chest obtained today in follow up of lung mass. CT scan abdomen and pelvis has been ordered and I called Mr. Prater and informed him of this finding. Explained that our office would call tomorrow during business hours with timing of CT abd/pelvis once scheduled. He verbalizes understanding. Confirmed office visit with Dr. Patel on Wed 2/20 as well.

## 2019-02-19 ENCOUNTER — TELEPHONE (OUTPATIENT)
Dept: CARDIAC SURGERY | Facility: CLINIC | Age: 79
End: 2019-02-19

## 2019-02-20 ENCOUNTER — OFFICE VISIT (OUTPATIENT)
Dept: CARDIAC SURGERY | Facility: CLINIC | Age: 79
End: 2019-02-20

## 2019-02-20 ENCOUNTER — APPOINTMENT (OUTPATIENT)
Dept: PULMONOLOGY | Facility: HOSPITAL | Age: 79
End: 2019-02-20

## 2019-02-20 ENCOUNTER — HOSPITAL ENCOUNTER (OUTPATIENT)
Dept: CT IMAGING | Facility: HOSPITAL | Age: 79
Discharge: HOME | End: 2019-02-20
Admitting: NURSE PRACTITIONER

## 2019-02-20 ENCOUNTER — APPOINTMENT (OUTPATIENT)
Dept: CT IMAGING | Facility: HOSPITAL | Age: 79
End: 2019-02-20

## 2019-02-20 VITALS
SYSTOLIC BLOOD PRESSURE: 119 MMHG | HEART RATE: 112 BPM | DIASTOLIC BLOOD PRESSURE: 70 MMHG | BODY MASS INDEX: 26.84 KG/M2 | OXYGEN SATURATION: 98 % | HEIGHT: 64 IN | WEIGHT: 157.2 LBS

## 2019-02-20 DIAGNOSIS — K86.89 PANCREATIC MASS: ICD-10-CM

## 2019-02-20 DIAGNOSIS — I42.8 CARDIOMYOPATHY, NONISCHEMIC (HCC): ICD-10-CM

## 2019-02-20 DIAGNOSIS — N13.39 OTHER HYDRONEPHROSIS: Primary | ICD-10-CM

## 2019-02-20 DIAGNOSIS — N13.30 HYDRONEPHROSIS, UNSPECIFIED HYDRONEPHROSIS TYPE: ICD-10-CM

## 2019-02-20 DIAGNOSIS — C34.11 MALIGNANT NEOPLASM OF UPPER LOBE OF RIGHT LUNG (HCC): ICD-10-CM

## 2019-02-20 PROCEDURE — 99214 OFFICE O/P EST MOD 30 MIN: CPT | Performed by: THORACIC SURGERY (CARDIOTHORACIC VASCULAR SURGERY)

## 2019-02-20 PROCEDURE — 74176 CT ABD & PELVIS W/O CONTRAST: CPT

## 2019-03-01 ENCOUNTER — OFFICE VISIT (OUTPATIENT)
Dept: UROLOGY | Facility: CLINIC | Age: 79
End: 2019-03-01

## 2019-03-01 VITALS — BODY MASS INDEX: 25.61 KG/M2 | WEIGHT: 150 LBS | TEMPERATURE: 97.7 F | HEIGHT: 64 IN

## 2019-03-01 DIAGNOSIS — N13.30 HYDRONEPHROSIS, UNSPECIFIED HYDRONEPHROSIS TYPE: Primary | ICD-10-CM

## 2019-03-01 LAB
BILIRUB BLD-MCNC: NEGATIVE MG/DL
CLARITY, POC: CLEAR
COLOR UR: YELLOW
GLUCOSE UR STRIP-MCNC: NEGATIVE MG/DL
KETONES UR QL: NEGATIVE
LEUKOCYTE EST, POC: NEGATIVE
NITRITE UR-MCNC: NEGATIVE MG/ML
PH UR: 6 [PH] (ref 5–8)
PROT UR STRIP-MCNC: ABNORMAL MG/DL
RBC # UR STRIP: NEGATIVE /UL
SP GR UR: 1.01 (ref 1–1.03)
UROBILINOGEN UR QL: NORMAL

## 2019-03-01 PROCEDURE — 99204 OFFICE O/P NEW MOD 45 MIN: CPT | Performed by: UROLOGY

## 2019-03-01 PROCEDURE — 81003 URINALYSIS AUTO W/O SCOPE: CPT | Performed by: UROLOGY

## 2019-03-01 RX ORDER — SODIUM CHLORIDE 450 MG/100ML
100 INJECTION, SOLUTION INTRAVENOUS CONTINUOUS
Status: CANCELLED | OUTPATIENT
Start: 2019-03-01

## 2019-03-04 ENCOUNTER — APPOINTMENT (OUTPATIENT)
Dept: PREADMISSION TESTING | Facility: HOSPITAL | Age: 79
End: 2019-03-04

## 2019-03-04 VITALS
BODY MASS INDEX: 26.72 KG/M2 | DIASTOLIC BLOOD PRESSURE: 69 MMHG | HEART RATE: 108 BPM | WEIGHT: 156.53 LBS | OXYGEN SATURATION: 97 % | SYSTOLIC BLOOD PRESSURE: 129 MMHG | HEIGHT: 64 IN | RESPIRATION RATE: 16 BRPM

## 2019-03-04 LAB
ANION GAP SERPL CALCULATED.3IONS-SCNC: 6 MMOL/L (ref 4–13)
BUN BLD-MCNC: 39 MG/DL (ref 5–21)
BUN/CREAT SERPL: 19.4 (ref 7–25)
CALCIUM SPEC-SCNC: 10.6 MG/DL (ref 8.4–10.4)
CHLORIDE SERPL-SCNC: 104 MMOL/L (ref 98–110)
CO2 SERPL-SCNC: 22 MMOL/L (ref 24–31)
CREAT BLD-MCNC: 2.01 MG/DL (ref 0.5–1.4)
DEPRECATED RDW RBC AUTO: 55.1 FL (ref 40–54)
ERYTHROCYTE [DISTWIDTH] IN BLOOD BY AUTOMATED COUNT: 18.1 % (ref 12–15)
GFR SERPL CREATININE-BSD FRML MDRD: 32 ML/MIN/1.73
GLUCOSE BLD-MCNC: 144 MG/DL (ref 70–100)
HCT VFR BLD AUTO: 30.9 % (ref 40–52)
HGB BLD-MCNC: 9.6 G/DL (ref 14–18)
MCH RBC QN AUTO: 26.1 PG (ref 28–32)
MCHC RBC AUTO-ENTMCNC: 31.1 G/DL (ref 33–36)
MCV RBC AUTO: 84 FL (ref 82–95)
PLATELET # BLD AUTO: 540 10*3/MM3 (ref 130–400)
PMV BLD AUTO: 10.9 FL (ref 6–12)
POTASSIUM BLD-SCNC: 4.5 MMOL/L (ref 3.5–5.3)
RBC # BLD AUTO: 3.68 10*6/MM3 (ref 4.8–5.9)
SODIUM BLD-SCNC: 132 MMOL/L (ref 135–145)
WBC NRBC COR # BLD: 17.28 10*3/MM3 (ref 4.8–10.8)

## 2019-03-04 PROCEDURE — 85027 COMPLETE CBC AUTOMATED: CPT | Performed by: UROLOGY

## 2019-03-04 PROCEDURE — 36415 COLL VENOUS BLD VENIPUNCTURE: CPT

## 2019-03-04 PROCEDURE — 80048 BASIC METABOLIC PNL TOTAL CA: CPT | Performed by: UROLOGY

## 2019-03-04 NOTE — DISCHARGE INSTRUCTIONS
DAY OF SURGERY INSTRUCTIONS        YOUR SURGEON: MIKHAIL ANGELA    PROCEDURE: CYSTOSCOPY RETROGRADE PYELOGRAM AND STENT INSERTION - LEFT    DATE OF SURGERY: MARCH 8, 2019    ARRIVAL TIME: AS DIRECTED BY OFFICE    YOU MAY TAKE THE FOLLOWING MEDICATION(S) THE MORNING OF SURGERY WITH A SIP OF WATER: NOT APPLICABLE    ALL OTHER HOME MEDICATIONS CHECK WITH YOUR DOCTOR              MANAGING PAIN AFTER SURGERY    We know you are probably wondering what your pain will be like after surgery.  Following surgery it is unrealistic to expect you will not have pain.   Pain is how our bodies let us know that something is wrong or cautions us to be careful.  That said, our goal is to make your pain tolerable.    Methods we may use to treat your pain include (oral or IV medications, PCAs, epidurals, nerve blocks, etc.)   While some procedures require IV pain medications for a short time after surgery, transitioning to pain medications by mouth allows for better management of pain.   Your nurse will encourage you to take oral pain medications whenever possible.  IV medications work almost immediately, but only last a short while.  Taking medications by mouth allows for a more constant level of medication in your blood stream for a longer period of time.      Once your pain is out of control it is harder to get back under control.  It is important you are aware when your next dose of pain medication is due.  If you are admitted, your nurse may write the time of your next dose on the white board in your room to help you remember.      We are interested in your pain and encourage you to inform us about aggravating factors during your visit.   Many times a simple repositioning every few hours can make a big difference.    If your physician says it is okay, do not let your pain prevent you from getting out of bed. Be sure to call your nurse for assistance prior to getting up so you do not fall.      Before surgery, please decide your  tolerable pain goal.  These faces help describe the pain ratings we use on a 0-10 scale.   Be prepared to tell us your goal and whether or not you take pain or anxiety medications at home.            BEFORE YOU COME TO THE HOSPITAL  (Pre-op instructions)  • Do not eat, drink, smoke or chew gum after midnight the night before surgery.  This also includes no mints.  • Morning of surgery take only the medicines you have been instructed with a sip of water unless otherwise instructed  by your physician.  • Do not shave, wear makeup or dark nail polish.  • Remove all jewelry including rings.  • Leave anything you consider valuable at home.  • Leave your suitcase in the car until after your surgery.  • Bring the following with you if applicable:  o Picture ID and insurance, Medicare or Medicaid cards  o Co-pay/deductible required by insurance (cash, check, credit card)  o Copy of advance directive, living will or power-of- documents if not brought to PAT  o CPAP or BIPAP mask and tubing  o Relaxation aids (MP3 player, book, magazine)  • On the day of surgery check in at registration located at the main entrance of the hospital.       Outpatient Surgery Guidelines, Adult  Outpatient procedures are those for which the person having the procedure is allowed to go home the same day as the procedure. Various procedures are done on an outpatient basis. You should follow some general guidelines if you will be having an outpatient procedure.  LET YOUR HEALTH CARE PROVIDER KNOW ABOUT:  · Any allergies you have.  · All medicines you are taking, including vitamins, herbs, eye drops, creams, and over-the-counter medicines.  · Previous problems you or members of your family have had with the use of anesthetics.  · Any blood disorders you have.  · Previous surgeries you have had.  · Medical conditions you have.  RISKS AND COMPLICATIONS  Your health care provider will discuss possible risks and complications with you before  surgery. Common risks and complications include:    · Problems due to the use of anesthetics.  · Blood loss and replacement (does not apply to minor surgical procedures).  · Temporary increase in pain due to surgery.  · Uncorrected pain or problems that the surgery was meant to correct.  · Infection.  · New damage.  BEFORE THE PROCEDURE  · Ask your health care provider about changing or stopping your regular medicines. You may need to stop taking certain medicines in the days or weeks before the procedure.  · Stop smoking at least 2 weeks before surgery. This lowers your risk for complications during and after surgery. Ask your health care provider for help with this if needed.  · Eat your usual meals and a light supper the day before surgery. Continue fluid intake. Do not drink alcohol.  · Do not eat or drink after midnight the night before your surgery.   · Arrange for someone to take you home and to stay with you for 24 hours after the procedure. Medicine given for your procedure may affect your ability to drive or to care for yourself.  · Call your health care provider's office if you develop an illness or problem that may prevent you from safely having your procedure.  AFTER THE PROCEDURE  After surgery, you will be taken to a recovery area, where your progress will be monitored. If there are no complications, you will be allowed to go home when you are awake, stable, and taking fluids well. You may have numbness around the surgical site. Healing will take some time. You will have tenderness at the surgical site and may have some swelling and bruising. You may also have some nausea.  HOME CARE INSTRUCTIONS  · Do not drive for 24 hours, or as directed by your health care provider. Do not drive while taking prescription pain medicines.  · Do not drink alcohol for 24 hours.  · Do not make important decisions or sign legal documents for 24 hours.  · You may resume a normal diet and activities as directed.  · Do not  lift anything heavier than 10 pounds (4.5 kg) or play contact sports until your health care provider says it is okay.  · Change your bandages (dressings) as directed.  · Only take over-the-counter or prescription medicines as directed by your health care provider.  · Follow up with your health care provider as directed.  SEEK MEDICAL CARE IF:  · You have increased bleeding (more than a small spot) from the surgical site.  · You have redness, swelling, or increasing pain in the wound.  · You see pus coming from the wound.  · You have a fever.  · You notice a bad smell coming from the wound or dressing.  · You feel lightheaded or faint.  · You develop a rash.  · You have trouble breathing.  · You develop allergies.  MAKE SURE YOU:  · Understand these instructions.  · Will watch your condition.  · Will get help right away if you are not doing well or get worse.     This information is not intended to replace advice given to you by your health care provider. Make sure you discuss any questions you have with your health care provider.     Document Released: 09/12/2002 Document Revised: 05/03/2016 Document Reviewed: 05/22/2014  [employer] Interactive Patient Education ©2016 [employer]       Fall Prevention in Hospitals, Adult  As a hospital patient, your condition and the treatments you receive can increase your risk for falls. Some additional risk factors for falls in a hospital include:  · Being in an unfamiliar environment.  · Being on bed rest.  · Your surgery.  · Taking certain medicines.  · Your tubing requirements, such as intravenous (IV) therapy or catheters.  It is important that you learn how to decrease fall risks while at the hospital. Below are important tips that can help prevent falls.  SAFETY TIPS FOR PREVENTING FALLS  Talk about your risk of falling.  · Ask your health care provider why you are at risk for falling. Is it your medicine, illness, tubing placement, or something else?  · Make a plan with  your health care provider to keep you safe from falls.  · Ask your health care provider or pharmacist about side effects of your medicines. Some medicines can make you dizzy or affect your coordination.  Ask for help.  · Ask for help before getting out of bed. You may need to press your call button.  · Ask for assistance in getting safely to the toilet.  · Ask for a walker or cane to be put at your bedside. Ask that most of the side rails on your bed be placed up before your health care provider leaves the room.  · Ask family or friends to sit with you.  · Ask for things that are out of your reach, such as your glasses, hearing aids, telephone, bedside table, or call button.  Follow these tips to avoid falling:  · Stay lying or seated, rather than standing, while waiting for help.  · Wear rubber-soled slippers or shoes whenever you walk in the hospital.  · Avoid quick, sudden movements.  ¨ Change positions slowly.  ¨ Sit on the side of your bed before standing.  ¨ Stand up slowly and wait before you start to walk.  · Let your health care provider know if there is a spill on the floor.  · Pay careful attention to the medical equipment, electrical cords, and tubes around you.  · When you need help, use your call button by your bed or in the bathroom. Wait for one of your health care providers to help you.  · If you feel dizzy or unsure of your footing, return to bed and wait for assistance.  · Avoid being distracted by the TV, telephone, or another person in your room.  · Do not lean or support yourself on rolling objects, such as IV poles or bedside tables.     This information is not intended to replace advice given to you by your health care provider. Make sure you discuss any questions you have with your health care provider.     Document Released: 12/15/2001 Document Revised: 01/08/2016 Document Reviewed: 08/25/2013  [employer] Interactive Patient Education ©2016 [employer]       Surgical Site Infections  FAQs  What is a Surgical Site Infection (SSI)?  A surgical site infection is an infection that occurs after surgery in the part of the body where the surgery took place. Most patients who have surgery do not develop an infection. However, infections develop in about 1 to 3 out of every 100 patients who have surgery.  Some of the common symptoms of a surgical site infection are:  · Redness and pain around the area where you had surgery  · Drainage of cloudy fluid from your surgical wound  · Fever  Can SSIs be treated?  Yes. Most surgical site infections can be treated with antibiotics. The antibiotic given to you depends on the bacteria (germs) causing the infection. Sometimes patients with SSIs also need another surgery to treat the infection.  What are some of the things that hospitals are doing to prevent SSIs?  To prevent SSIs, doctors, nurses, and other healthcare providers:  · Clean their hands and arms up to their elbows with an antiseptic agent just before the surgery.  · Clean their hands with soap and water or an alcohol-based hand rub before and after caring for each patient.  · May remove some of your hair immediately before your surgery using electric clippers if the hair is in the same area where the procedure will occur. They should not shave you with a razor.  · Wear special hair covers, masks, gowns, and gloves during surgery to keep the surgery area clean.  · Give you antibiotics before your surgery starts. In most cases, you should get antibiotics within 60 minutes before the surgery starts and the antibiotics should be stopped within 24 hours after surgery.  · Clean the skin at the site of your surgery with a special soap that kills germs.  What can I do to help prevent SSIs?  Before your surgery:  · Tell your doctor about other medical problems you may have. Health problems such as allergies, diabetes, and obesity could affect your surgery and your treatment.  · Quit smoking. Patients who smoke  get more infections. Talk to your doctor about how you can quit before your surgery.  · Do not shave near where you will have surgery. Shaving with a razor can irritate your skin and make it easier to develop an infection.  At the time of your surgery:  · Speak up if someone tries to shave you with a razor before surgery. Ask why you need to be shaved and talk with your surgeon if you have any concerns.  · Ask if you will get antibiotics before surgery.  After your surgery:  · Make sure that your healthcare providers clean their hands before examining you, either with soap and water or an alcohol-based hand rub.  · If you do not see your providers clean their hands, please ask them to do so.  · Family and friends who visit you should not touch the surgical wound or dressings.  · Family and friends should clean their hands with soap and water or an alcohol-based hand rub before and after visiting you. If you do not see them clean their hands, ask them to clean their hands.  What do I need to do when I go home from the hospital?  · Before you go home, your doctor or nurse should explain everything you need to know about taking care of your wound. Make sure you understand how to care for your wound before you leave the hospital.  · Always clean your hands before and after caring for your wound.  · Before you go home, make sure you know who to contact if you have questions or problems after you get home.  · If you have any symptoms of an infection, such as redness and pain at the surgery site, drainage, or fever, call your doctor immediately.  If you have additional questions, please ask your doctor or nurse.  Developed and co-sponsored by The Society for Healthcare Epidemiology of [place] (SHEA); Infectious Diseases Society of [place] (IDSA); American Hospital Association; Association for Professionals in Infection Control and Epidemiology (APIC); Centers for Disease Control and Prevention (CDC); and The Joint  Commission.     This information is not intended to replace advice given to you by your health care provider. Make sure you discuss any questions you have with your health care provider.     Document Released: 12/23/2014 Document Revised: 01/08/2016 Document Reviewed: 03/02/2016  [employer] Interactive Patient Education ©2016 [employer]     PATIENT/FAMILY/RESPONSIBLE PARTY VERBALIZES UNDERSTANDING OF ABOVE EDUCATION.  COPY OF PAIN SCALE GIVEN AND REVIEWED WITH VERBALIZED UNDERSTANDING.

## 2019-03-08 ENCOUNTER — ANESTHESIA EVENT (OUTPATIENT)
Dept: PERIOP | Facility: HOSPITAL | Age: 79
End: 2019-03-08

## 2019-03-08 ENCOUNTER — ANESTHESIA (OUTPATIENT)
Dept: PERIOP | Facility: HOSPITAL | Age: 79
End: 2019-03-08

## 2019-03-08 ENCOUNTER — HOSPITAL ENCOUNTER (OUTPATIENT)
Facility: HOSPITAL | Age: 79
Setting detail: HOSPITAL OUTPATIENT SURGERY
Discharge: HOME | End: 2019-03-08
Attending: UROLOGY | Admitting: UROLOGY

## 2019-03-08 ENCOUNTER — APPOINTMENT (OUTPATIENT)
Dept: GENERAL RADIOLOGY | Facility: HOSPITAL | Age: 79
End: 2019-03-08

## 2019-03-08 VITALS
HEART RATE: 98 BPM | TEMPERATURE: 98 F | DIASTOLIC BLOOD PRESSURE: 74 MMHG | SYSTOLIC BLOOD PRESSURE: 126 MMHG | RESPIRATION RATE: 16 BRPM | OXYGEN SATURATION: 97 %

## 2019-03-08 DIAGNOSIS — N13.30 HYDRONEPHROSIS, UNSPECIFIED HYDRONEPHROSIS TYPE: ICD-10-CM

## 2019-03-08 PROCEDURE — 25010000002 PROPOFOL 10 MG/ML EMULSION: Performed by: NURSE ANESTHETIST, CERTIFIED REGISTERED

## 2019-03-08 PROCEDURE — 25010000002 FENTANYL CITRATE (PF) 100 MCG/2ML SOLUTION: Performed by: NURSE ANESTHETIST, CERTIFIED REGISTERED

## 2019-03-08 PROCEDURE — 74420 UROGRAPHY RTRGR +-KUB: CPT

## 2019-03-08 PROCEDURE — 25010000002 ONDANSETRON PER 1 MG: Performed by: NURSE ANESTHETIST, CERTIFIED REGISTERED

## 2019-03-08 PROCEDURE — 74420 UROGRAPHY RTRGR +-KUB: CPT | Performed by: UROLOGY

## 2019-03-08 PROCEDURE — 25010000002 SUCCINYLCHOLINE PER 20 MG: Performed by: NURSE ANESTHETIST, CERTIFIED REGISTERED

## 2019-03-08 PROCEDURE — C1758 CATHETER, URETERAL: HCPCS | Performed by: UROLOGY

## 2019-03-08 PROCEDURE — 52351 CYSTOURETERO & OR PYELOSCOPE: CPT | Performed by: UROLOGY

## 2019-03-08 PROCEDURE — 25010000002 DEXAMETHASONE PER 1 MG: Performed by: ANESTHESIOLOGY

## 2019-03-08 PROCEDURE — 25010000002 IOPAMIDOL 61 % SOLUTION: Performed by: UROLOGY

## 2019-03-08 PROCEDURE — 25010000002 DEXAMETHASONE PER 1 MG: Performed by: NURSE ANESTHETIST, CERTIFIED REGISTERED

## 2019-03-08 RX ORDER — SODIUM CHLORIDE 0.9 % (FLUSH) 0.9 %
1-10 SYRINGE (ML) INJECTION AS NEEDED
Status: DISCONTINUED | OUTPATIENT
Start: 2019-03-08 | End: 2019-03-08 | Stop reason: HOSPADM

## 2019-03-08 RX ORDER — SODIUM CHLORIDE 0.9 % (FLUSH) 0.9 %
3 SYRINGE (ML) INJECTION AS NEEDED
Status: DISCONTINUED | OUTPATIENT
Start: 2019-03-08 | End: 2019-03-08 | Stop reason: HOSPADM

## 2019-03-08 RX ORDER — SODIUM CHLORIDE 0.9 % (FLUSH) 0.9 %
3 SYRINGE (ML) INJECTION EVERY 12 HOURS SCHEDULED
Status: DISCONTINUED | OUTPATIENT
Start: 2019-03-08 | End: 2019-03-08 | Stop reason: HOSPADM

## 2019-03-08 RX ORDER — HYDROCODONE BITARTRATE AND ACETAMINOPHEN 5; 325 MG/1; MG/1
1 TABLET ORAL ONCE AS NEEDED
Status: DISCONTINUED | OUTPATIENT
Start: 2019-03-08 | End: 2019-03-08 | Stop reason: HOSPADM

## 2019-03-08 RX ORDER — PROPOFOL 10 MG/ML
VIAL (ML) INTRAVENOUS AS NEEDED
Status: DISCONTINUED | OUTPATIENT
Start: 2019-03-08 | End: 2019-03-08 | Stop reason: SURG

## 2019-03-08 RX ORDER — ONDANSETRON 2 MG/ML
4 INJECTION INTRAMUSCULAR; INTRAVENOUS ONCE AS NEEDED
Status: DISCONTINUED | OUTPATIENT
Start: 2019-03-08 | End: 2019-03-08 | Stop reason: HOSPADM

## 2019-03-08 RX ORDER — SODIUM CHLORIDE 450 MG/100ML
100 INJECTION, SOLUTION INTRAVENOUS CONTINUOUS
Status: DISCONTINUED | OUTPATIENT
Start: 2019-03-08 | End: 2019-03-08 | Stop reason: HOSPADM

## 2019-03-08 RX ORDER — FENTANYL CITRATE 50 UG/ML
INJECTION, SOLUTION INTRAMUSCULAR; INTRAVENOUS AS NEEDED
Status: DISCONTINUED | OUTPATIENT
Start: 2019-03-08 | End: 2019-03-08 | Stop reason: SURG

## 2019-03-08 RX ORDER — LABETALOL HYDROCHLORIDE 5 MG/ML
5 INJECTION, SOLUTION INTRAVENOUS
Status: DISCONTINUED | OUTPATIENT
Start: 2019-03-08 | End: 2019-03-08 | Stop reason: HOSPADM

## 2019-03-08 RX ORDER — DEXAMETHASONE SODIUM PHOSPHATE 4 MG/ML
INJECTION, SOLUTION INTRA-ARTICULAR; INTRALESIONAL; INTRAMUSCULAR; INTRAVENOUS; SOFT TISSUE AS NEEDED
Status: DISCONTINUED | OUTPATIENT
Start: 2019-03-08 | End: 2019-03-08 | Stop reason: SURG

## 2019-03-08 RX ORDER — MEPERIDINE HYDROCHLORIDE 25 MG/ML
12.5 INJECTION INTRAMUSCULAR; INTRAVENOUS; SUBCUTANEOUS
Status: DISCONTINUED | OUTPATIENT
Start: 2019-03-08 | End: 2019-03-08 | Stop reason: HOSPADM

## 2019-03-08 RX ORDER — MAGNESIUM HYDROXIDE 1200 MG/15ML
LIQUID ORAL AS NEEDED
Status: DISCONTINUED | OUTPATIENT
Start: 2019-03-08 | End: 2019-03-08 | Stop reason: HOSPADM

## 2019-03-08 RX ORDER — NALOXONE HCL 0.4 MG/ML
0.4 VIAL (ML) INJECTION AS NEEDED
Status: DISCONTINUED | OUTPATIENT
Start: 2019-03-08 | End: 2019-03-08 | Stop reason: HOSPADM

## 2019-03-08 RX ORDER — OXYCODONE AND ACETAMINOPHEN 10; 325 MG/1; MG/1
1 TABLET ORAL ONCE AS NEEDED
Status: DISCONTINUED | OUTPATIENT
Start: 2019-03-08 | End: 2019-03-08 | Stop reason: HOSPADM

## 2019-03-08 RX ORDER — SUCCINYLCHOLINE CHLORIDE 20 MG/ML
INJECTION INTRAMUSCULAR; INTRAVENOUS AS NEEDED
Status: DISCONTINUED | OUTPATIENT
Start: 2019-03-08 | End: 2019-03-08 | Stop reason: SURG

## 2019-03-08 RX ORDER — CHOLECALCIFEROL (VITAMIN D3) 125 MCG
5 CAPSULE ORAL NIGHTLY PRN
COMMUNITY
End: 2021-08-25

## 2019-03-08 RX ORDER — LIDOCAINE HYDROCHLORIDE 20 MG/ML
INJECTION, SOLUTION INFILTRATION; PERINEURAL AS NEEDED
Status: DISCONTINUED | OUTPATIENT
Start: 2019-03-08 | End: 2019-03-08 | Stop reason: SURG

## 2019-03-08 RX ORDER — SODIUM CHLORIDE, SODIUM LACTATE, POTASSIUM CHLORIDE, CALCIUM CHLORIDE 600; 310; 30; 20 MG/100ML; MG/100ML; MG/100ML; MG/100ML
1000 INJECTION, SOLUTION INTRAVENOUS CONTINUOUS
Status: DISCONTINUED | OUTPATIENT
Start: 2019-03-08 | End: 2019-03-08 | Stop reason: HOSPADM

## 2019-03-08 RX ORDER — OXYCODONE AND ACETAMINOPHEN 7.5; 325 MG/1; MG/1
2 TABLET ORAL EVERY 4 HOURS PRN
Status: DISCONTINUED | OUTPATIENT
Start: 2019-03-08 | End: 2019-03-08 | Stop reason: HOSPADM

## 2019-03-08 RX ORDER — IPRATROPIUM BROMIDE AND ALBUTEROL SULFATE 2.5; .5 MG/3ML; MG/3ML
3 SOLUTION RESPIRATORY (INHALATION) ONCE AS NEEDED
Status: DISCONTINUED | OUTPATIENT
Start: 2019-03-08 | End: 2019-03-08 | Stop reason: HOSPADM

## 2019-03-08 RX ORDER — SODIUM CHLORIDE, SODIUM LACTATE, POTASSIUM CHLORIDE, CALCIUM CHLORIDE 600; 310; 30; 20 MG/100ML; MG/100ML; MG/100ML; MG/100ML
100 INJECTION, SOLUTION INTRAVENOUS CONTINUOUS
Status: DISCONTINUED | OUTPATIENT
Start: 2019-03-08 | End: 2019-03-08 | Stop reason: HOSPADM

## 2019-03-08 RX ORDER — BUPIVACAINE HCL/0.9 % NACL/PF 0.1 %
2 PLASTIC BAG, INJECTION (ML) EPIDURAL ONCE
Status: COMPLETED | OUTPATIENT
Start: 2019-03-08 | End: 2019-03-08

## 2019-03-08 RX ORDER — ROCURONIUM BROMIDE 10 MG/ML
INJECTION, SOLUTION INTRAVENOUS AS NEEDED
Status: DISCONTINUED | OUTPATIENT
Start: 2019-03-08 | End: 2019-03-08 | Stop reason: SURG

## 2019-03-08 RX ORDER — ACETAMINOPHEN 500 MG
1000 TABLET ORAL ONCE
Status: COMPLETED | OUTPATIENT
Start: 2019-03-08 | End: 2019-03-08

## 2019-03-08 RX ORDER — PHENYLEPHRINE HCL IN 0.9% NACL 0.8MG/10ML
SYRINGE (ML) INTRAVENOUS AS NEEDED
Status: DISCONTINUED | OUTPATIENT
Start: 2019-03-08 | End: 2019-03-08 | Stop reason: SURG

## 2019-03-08 RX ORDER — FENTANYL CITRATE 50 UG/ML
25 INJECTION, SOLUTION INTRAMUSCULAR; INTRAVENOUS AS NEEDED
Status: DISCONTINUED | OUTPATIENT
Start: 2019-03-08 | End: 2019-03-08 | Stop reason: HOSPADM

## 2019-03-08 RX ORDER — METOCLOPRAMIDE HYDROCHLORIDE 5 MG/ML
5 INJECTION INTRAMUSCULAR; INTRAVENOUS
Status: DISCONTINUED | OUTPATIENT
Start: 2019-03-08 | End: 2019-03-08 | Stop reason: HOSPADM

## 2019-03-08 RX ORDER — DEXAMETHASONE SODIUM PHOSPHATE 4 MG/ML
4 INJECTION, SOLUTION INTRA-ARTICULAR; INTRALESIONAL; INTRAMUSCULAR; INTRAVENOUS; SOFT TISSUE ONCE AS NEEDED
Status: COMPLETED | OUTPATIENT
Start: 2019-03-08 | End: 2019-03-08

## 2019-03-08 RX ORDER — IBUPROFEN 600 MG/1
600 TABLET ORAL ONCE AS NEEDED
Status: DISCONTINUED | OUTPATIENT
Start: 2019-03-08 | End: 2019-03-08 | Stop reason: HOSPADM

## 2019-03-08 RX ORDER — ONDANSETRON 2 MG/ML
INJECTION INTRAMUSCULAR; INTRAVENOUS AS NEEDED
Status: DISCONTINUED | OUTPATIENT
Start: 2019-03-08 | End: 2019-03-08 | Stop reason: SURG

## 2019-03-08 RX ORDER — LIDOCAINE HYDROCHLORIDE 40 MG/ML
SOLUTION TOPICAL AS NEEDED
Status: DISCONTINUED | OUTPATIENT
Start: 2019-03-08 | End: 2019-03-08 | Stop reason: SURG

## 2019-03-08 RX ADMIN — DEXAMETHASONE SODIUM PHOSPHATE 4 MG: 4 INJECTION, SOLUTION INTRA-ARTICULAR; INTRALESIONAL; INTRAMUSCULAR; INTRAVENOUS; SOFT TISSUE at 12:58

## 2019-03-08 RX ADMIN — PROPOFOL 150 MG: 10 INJECTION, EMULSION INTRAVENOUS at 13:15

## 2019-03-08 RX ADMIN — LIDOCAINE HYDROCHLORIDE 100 MG: 20 INJECTION, SOLUTION INFILTRATION; PERINEURAL at 13:14

## 2019-03-08 RX ADMIN — Medication 2 G: at 13:20

## 2019-03-08 RX ADMIN — ROCURONIUM BROMIDE 10 MG: 10 INJECTION INTRAVENOUS at 13:15

## 2019-03-08 RX ADMIN — LIDOCAINE HYDROCHLORIDE 1 EACH: 40 SOLUTION TOPICAL at 13:15

## 2019-03-08 RX ADMIN — DEXAMETHASONE SODIUM PHOSPHATE 4 MG: 4 INJECTION, SOLUTION INTRAMUSCULAR; INTRAVENOUS at 13:22

## 2019-03-08 RX ADMIN — SUCCINYLCHOLINE CHLORIDE 150 MG: 20 INJECTION, SOLUTION INTRAMUSCULAR; INTRAVENOUS at 13:15

## 2019-03-08 RX ADMIN — Medication 80 MCG: at 13:26

## 2019-03-08 RX ADMIN — Medication 160 MCG: at 13:23

## 2019-03-08 RX ADMIN — ONDANSETRON HYDROCHLORIDE 4 MG: 2 SOLUTION INTRAMUSCULAR; INTRAVENOUS at 13:22

## 2019-03-08 RX ADMIN — ACETAMINOPHEN 1000 MG: 500 TABLET, FILM COATED ORAL at 12:57

## 2019-03-08 RX ADMIN — FENTANYL CITRATE 100 MCG: 50 INJECTION, SOLUTION INTRAMUSCULAR; INTRAVENOUS at 13:15

## 2019-03-08 RX ADMIN — SODIUM CHLORIDE, POTASSIUM CHLORIDE, SODIUM LACTATE AND CALCIUM CHLORIDE 1000 ML: 600; 310; 30; 20 INJECTION, SOLUTION INTRAVENOUS at 11:56

## 2019-03-08 NOTE — ANESTHESIA PROCEDURE NOTES
Airway  Urgency: elective    Airway not difficult    General Information and Staff    Patient location during procedure: OR  CRNA: Palma Helton CRNA    Indications and Patient Condition  Indications for airway management: airway protection    Preoxygenated: yes  MILS maintained throughout  Mask difficulty assessment: 1 - vent by mask    Final Airway Details  Final airway type: endotracheal airway      Successful airway: ETT  Cuffed: yes   Successful intubation technique: direct laryngoscopy  Facilitating devices/methods: intubating stylet  Endotracheal tube insertion site: oral  Blade: Huertas  Blade size: 2  ETT size (mm): 7.0  Cormack-Lehane Classification: grade I - full view of glottis  Placement verified by: chest auscultation, capnometry and palpation of cuff   Cuff volume (mL): 7  Measured from: teeth  ETT to teeth (cm): 23  Number of attempts at approach: 1

## 2019-03-08 NOTE — ANESTHESIA PREPROCEDURE EVALUATION
Anesthesia Evaluation     Patient summary reviewed   no history of anesthetic complications:  NPO Solid Status: > 8 hours  NPO Liquid Status: > 6 hours           Airway   Mallampati: I  TM distance: >3 FB  Neck ROM: full  Dental          Pulmonary - normal exam    breath sounds clear to auscultation  (+) a smoker (quit 2003) Former, COPD mild,   (-) asthma, recent URI, sleep apnea    ROS comment: RUL Mass  Cardiovascular - normal exam  Exercise tolerance: good (4-7 METS)    ECG reviewed  Patient on routine beta blocker and Beta blocker given within 24 hours of surgery  Rhythm: regular  Rate: normal    (+) hypertension well controlled,   (-) pacemaker, past MI, angina, cardiac stents      Neuro/Psych  (-) seizures, TIA, CVA  GI/Hepatic/Renal/Endo    (+)  GERD well controlled,    (-) liver disease, no renal disease, diabetes, hypothyroidism, hyperthyroidism    Musculoskeletal     Abdominal    Substance History      OB/GYN          Other        ROS/Med Hx Other: Blind in left eye, due to injury at age 8 yrs                    Anesthesia Plan    ASA 2     general   total IV anesthesia  intravenous induction   Anesthetic plan, all risks, benefits, and alternatives have been provided, discussed and informed consent has been obtained with: patient.

## 2019-03-08 NOTE — OP NOTE
"Operative Summary    Karoline Prater  Date of Procedure: 3/8/2019    Pre-op Diagnosis:   Hydronephrosis, unspecified hydronephrosis type [N13.30]    Post-op Diagnosis:     Post-Op Diagnosis Codes:     * Hydronephrosis, unspecified hydronephrosis type [N13.30]    Procedure/CPT® Codes:      Procedure(s):  CYSTOSCOPY RETROGRADE BILATERAL PYELOGRAM    Surgeon(s):  Jos Sylvester MD    Anesthesia: General    Staff:   Circulator: Betsey Garnica RN  Scrub Person: Laly Parrish  Other: Logan Silva    Indications for procedure:  \"Mild left-sided hydronephrosis likely related to obstruction of the  proximal left ureter due to a 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 Retention of contrast within the  left renal cortex in patchy distribution may represent ATN. There is  increased attenuation of urine within the dilated left renal collecting  system probably due to delayed excretion of contrast\" seen on CT abdomen/pelvis.    Findings:   1.  Cystoscopy findings: Anterior urethra without evidence of stricture.  Prostatic urethra shows moderate obstruction from bilobed bar enlargement.  The bladder neck itself is open.  The bladder is moderately trabeculated.  There are a few scattered diverticuli.  The orifices are orthotopic in position and normal in configuration.  2.  Interpretation of right retrograde pyelogram: The right ureter is normal in its course and caliber.  The pelvis and calyceal system show no mass or filling defect.  There is no hydronephrosis.  3.  Interpretation of left retrograde pyelogram: Left ureter is normal in its course and caliber in the distal, mid, and proximal aspects.  However this patient has a significant bifid pelvis and slightly dilated calyces.  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  I do not think the patient is obstructed.  I am not surprised at a finding " of mild hydronephrosis given this anatomy.      Procedure details:  After appropriate anesthesia, positioning, prep and drape, timeout protocol was observed.     22 Slovenian cystoscope with 30 degree lens is inserted into the bladder.  I did 30 and 70 degree lens inspection of the bladder.  Please see cystoscopy findings above.    The right orifice is identified and half-strength contrast is injected with a 5 Slovenian cone-tip catheter.  The identical procedure was done on the left side.  See above for interpretation bilateral retrograde pyelograms.    Patient had moderate hematuria from instrumentation due to enlarged prostate gland.  At the conclusion of put an 18 Slovenian Lanza catheter which will be used for continuous bladder irrigation.  I do plan to discontinue this prior to discharge today.    Estimated Blood Loss: Less than 30 mL    Specimens:                None      Drains: 18 Slovenian three-way Lanza catheter to be removed in recovery room for outpatient surgery when urine clears.    Complications: none    Plan: This patient has no evidence of significant obstruction.  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  The finding of mild hydronephrosis is not a surprise and would not be relieved by ureteral stenting.  This should not lead to deterioration of renal function because he has had this all his life.  Dilatation of the calyces is probably more related to intravascular volume leading to increased output than any obstruction.  This patient would not benefit from ureteral stenting.    (Please note that portions of this note were completed with a voice recognition program.)  Jos Sylvester MD     Date: 3/8/2019  Time: 2:06 PM

## 2019-03-08 NOTE — NURSING NOTE
Patient states that he forgot that he was NPO and he drank 6 oz. Of ensure along with 8 0z. Of water this am at 0745 am

## 2019-03-08 NOTE — ANESTHESIA POSTPROCEDURE EVALUATION
Patient: Karoline Prater    Procedure Summary     Date:  03/08/19 Room / Location:   [facility] OR 01 / [facility] OR    Anesthesia Start:  1310 Anesthesia Stop:  1355    Procedure:  CYSTOSCOPY RETROGRADE BILATERAL PYELOGRAM (Left Bladder) Diagnosis:       Hydronephrosis, unspecified hydronephrosis type      (Hydronephrosis, unspecified hydronephrosis type [N13.30])    Surgeon:  Jos Sylvester MD Provider:  Palma Helton CRNA    Anesthesia Type:  general ASA Status:  2          Anesthesia Type: general  Last vitals  BP   106/73 (03/08/19 1442)   Temp   98 °F (36.7 °C) (03/08/19 1430)   Pulse   98 (03/08/19 1442)   Resp   16 (03/08/19 1442)     SpO2   94 % (03/08/19 1442)     Post Anesthesia Care and Evaluation    Patient location during evaluation: PACU  Patient participation: complete - patient participated  Level of consciousness: awake and alert  Pain management: adequate  Airway patency: patent  Anesthetic complications: No anesthetic complications  PONV Status: none  Cardiovascular status: acceptable and hemodynamically stable  Respiratory status: acceptable  Hydration status: acceptable    Comments: Blood pressure 106/73, pulse 98, temperature 98 °F (36.7 °C), temperature source Axillary, resp. rate 16, SpO2 94 %.    Patient discharged from PACU based upon Reta score. Please see RN notes for further details

## 2019-03-08 NOTE — DISCHARGE INSTRUCTIONS
YOUR NEXT PAIN MEDICATION IS DUE AT______________         General Anesthesia, Adult, Care After  Refer to this sheet in the next few weeks. These instructions provide you with information on caring for yourself after your procedure. Your health care provider may also give you more specific instructions. Your treatment has been planned according to current medical practices, but problems sometimes occur. Call your health care provider if you have any problems or questions after your procedure.  WHAT TO EXPECT AFTER THE PROCEDURE  After the procedure, it is typical to experience:  · Sleepiness.  · Nausea and vomiting.  HOME CARE INSTRUCTIONS  · For the first 24 hours after general anesthesia:  ¨ Have a responsible person with you.  ¨ Do not drive a car. If you are alone, do not take public transportation.  ¨ Do not drink alcohol.  ¨ Do not take medicine that has not been prescribed by your health care provider.  ¨ Do not sign important papers or make important decisions.  ¨ You may resume a normal diet and activities as directed by your health care provider.  · Change bandages (dressings) as directed.  · If you have questions or problems that seem related to general anesthesia, call the hospital and ask for the anesthetist or anesthesiologist on call.  SEEK MEDICAL CARE IF:  · You have nausea and vomiting that continue the day after anesthesia.  · You develop a rash.  SEEK IMMEDIATE MEDICAL CARE IF:    · You have difficulty breathing.  · You have chest pain.  · You have any allergic problems.     This information is not intended to replace advice given to you by your health care provider. Make sure you discuss any questions you have with your health care provider.     Document Released: 03/26/2002 Document Revised: 01/08/2016 Document Reviewed: 07/03/2014  [employer] Interactive Patient Education ©2016 [employer]    CALL YOUR PHYSICIAN IF YOU EXPERIENCE  INCREASED PAIN NOT HELPED BY YOUR PAIN MEDICATION.    .                                               Fall Prevention in the Home      Falls can cause injuries. They can happen to people of all ages. There are many things you can do to make your home safe and to help prevent falls.    WHAT CAN I DO ON THE OUTSIDE OF MY HOME?  · Regularly fix the edges of walkways and driveways and fix any cracks.  · Remove anything that might make you trip as you walk through a door, such as a raised step or threshold.  · Trim any bushes or trees on the path to your home.  · Use bright outdoor lighting.  · Clear any walking paths of anything that might make someone trip, such as rocks or tools.  · Regularly check to see if handrails are loose or broken. Make sure that both sides of any steps have handrails.  · Any raised decks and porches should have guardrails on the edges.  · Have any leaves, snow, or ice cleared regularly.  · Use sand or salt on walking paths during winter.  · Clean up any spills in your garage right away. This includes oil or grease spills.  WHAT CAN I DO IN THE BATHROOM?    · Use night lights.  · Install grab bars by the toilet and in the tub and shower. Do not use towel bars as grab bars.  · Use non-skid mats or decals in the tub or shower.  · If you need to sit down in the shower, use a plastic, non-slip stool.  · Keep the floor dry. Clean up any water that spills on the floor as soon as it happens.  · Remove soap buildup in the tub or shower regularly.  · Attach bath mats securely with double-sided non-slip rug tape.  · Do not have throw rugs and other things on the floor that can make you trip.  WHAT CAN I DO IN THE BEDROOM?  · Use night lights.  · Make sure that you have a light by your bed that is easy to reach.  · Do not use any sheets or blankets that are too big for your bed. They should not hang down onto the floor.  · Have a firm chair that has side arms. You can use this for support while you get dressed.  · Do not have throw rugs and other things on the floor  that can make you trip.  WHAT CAN I DO IN THE KITCHEN?  · Clean up any spills right away.  · Avoid walking on wet floors.  · Keep items that you use a lot in easy-to-reach places.  · If you need to reach something above you, use a strong step stool that has a grab bar.  · Keep electrical cords out of the way.  · Do not use floor polish or wax that makes floors slippery. If you must use wax, use non-skid floor wax.  · Do not have throw rugs and other things on the floor that can make you trip.  WHAT CAN I DO WITH MY STAIRS?  · Do not leave any items on the stairs.  · Make sure that there are handrails on both sides of the stairs and use them. Fix handrails that are broken or loose. Make sure that handrails are as long as the stairways.  · Check any carpeting to make sure that it is firmly attached to the stairs. Fix any carpet that is loose or worn.  · Avoid having throw rugs at the top or bottom of the stairs. If you do have throw rugs, attach them to the floor with carpet tape.  · Make sure that you have a light switch at the top of the stairs and the bottom of the stairs. If you do not have them, ask someone to add them for you.  WHAT ELSE CAN I DO TO HELP PREVENT FALLS?  · Wear shoes that:  ¨ Do not have high heels.  ¨ Have rubber bottoms.  ¨ Are comfortable and fit you well.  ¨ Are closed at the toe. Do not wear sandals.  · If you use a stepladder:  ¨ Make sure that it is fully opened. Do not climb a closed stepladder.  ¨ Make sure that both sides of the stepladder are locked into place.  ¨ Ask someone to hold it for you, if possible.  · Clearly calos and make sure that you can see:  ¨ Any grab bars or handrails.  ¨ First and last steps.  ¨ Where the edge of each step is.  · Use tools that help you move around (mobility aids) if they are needed. These include:  ¨ Canes.  ¨ Walkers.  ¨ Scooters.  ¨ Crutches.  · Turn on the lights when you go into a dark area. Replace any light bulbs as soon as they burn  out.  · Set up your furniture so you have a clear path. Avoid moving your furniture around.  · If any of your floors are uneven, fix them.  · If there are any pets around you, be aware of where they are.  · Review your medicines with your doctor. Some medicines can make you feel dizzy. This can increase your chance of falling.  Ask your doctor what other things that you can do to help prevent falls.     This information is not intended to replace advice given to you by your health care provider. Make sure you discuss any questions you have with your health care provider.     Document Released: 10/14/2010 Document Revised: 05/03/2016 Document Reviewed: 01/22/2016  [employer] Interactive Patient Education ©2016 [employer]     PATIENT/FAMILY/RESPONSIBLE PARTY VERBALIZES UNDERSTANDING OF ABOVE EDUCATION.  COPY OF PAIN SCALE GIVEN AND REVIEWED WITH VERBALIZED UNDERSTANDING.

## 2019-03-12 ENCOUNTER — ANESTHESIA EVENT (OUTPATIENT)
Dept: ENDOSCOPY | Age: 79
DRG: 286 | End: 2019-03-12
Payer: MEDICARE

## 2019-03-13 ENCOUNTER — HOSPITAL ENCOUNTER (INPATIENT)
Age: 79
LOS: 5 days | Discharge: HOME | DRG: 286 | End: 2019-03-18
Attending: INTERNAL MEDICINE | Admitting: FAMILY MEDICINE
Payer: MEDICARE

## 2019-03-13 ENCOUNTER — ANESTHESIA (OUTPATIENT)
Dept: ENDOSCOPY | Age: 79
DRG: 286 | End: 2019-03-13
Payer: MEDICARE

## 2019-03-13 ENCOUNTER — OUTSIDE FACILITY SERVICE (OUTPATIENT)
Dept: PULMONOLOGY | Facility: CLINIC | Age: 79
End: 2019-03-13

## 2019-03-13 ENCOUNTER — APPOINTMENT (OUTPATIENT)
Dept: GENERAL RADIOLOGY | Age: 79
DRG: 286 | End: 2019-03-13
Attending: INTERNAL MEDICINE
Payer: MEDICARE

## 2019-03-13 VITALS
RESPIRATION RATE: 29 BRPM | SYSTOLIC BLOOD PRESSURE: 88 MMHG | OXYGEN SATURATION: 98 % | DIASTOLIC BLOOD PRESSURE: 56 MMHG

## 2019-03-13 PROBLEM — I47.20 V TACH (HCC): Status: ACTIVE | Noted: 2019-03-13

## 2019-03-13 LAB
ALBUMIN SERPL-MCNC: 1.6 G/DL (ref 3.5–5.2)
ALP BLD-CCNC: 64 U/L (ref 40–130)
ALT SERPL-CCNC: 8 U/L (ref 5–41)
ANION GAP SERPL CALCULATED.3IONS-SCNC: 12 MMOL/L (ref 7–19)
AST SERPL-CCNC: 22 U/L (ref 5–40)
BACTERIA: NEGATIVE /HPF
BASE EXCESS ARTERIAL: -3.8 MMOL/L (ref -2–2)
BILIRUB SERPL-MCNC: 0.4 MG/DL (ref 0.2–1.2)
BILIRUBIN URINE: NEGATIVE
BLOOD, URINE: NEGATIVE
BUN BLDV-MCNC: 39 MG/DL (ref 8–23)
CALCIUM SERPL-MCNC: 9.6 MG/DL (ref 8.8–10.2)
CARBOXYHEMOGLOBIN ARTERIAL: 1.9 % (ref 0–5)
CHLORIDE BLD-SCNC: 102 MMOL/L (ref 98–111)
CLARITY: CLEAR
CO2: 16 MMOL/L (ref 22–29)
COLOR: YELLOW
CREAT SERPL-MCNC: 2 MG/DL (ref 0.5–1.2)
CREATININE URINE: 71.8 MG/DL (ref 4.2–622)
EPITHELIAL CELLS, UA: 1 /HPF (ref 0–5)
FERRITIN: >2000 NG/ML (ref 30–400)
GFR NON-AFRICAN AMERICAN: 33
GLUCOSE BLD-MCNC: 127 MG/DL (ref 74–109)
GLUCOSE URINE: NEGATIVE MG/DL
HAPTOGLOBIN: 341 MG/DL (ref 30–200)
HCO3 ARTERIAL: 18.9 MMOL/L (ref 22–26)
HCT VFR BLD CALC: 28.8 % (ref 42–52)
HCT VFR BLD CALC: 30.2 % (ref 42–52)
HEMOGLOBIN, ART, EXTENDED: 9.6 G/DL (ref 14–18)
HEMOGLOBIN: 8.5 G/DL (ref 14–18)
HYALINE CASTS: 4 /HPF (ref 0–8)
IRON SATURATION: 22 % (ref 14–50)
IRON: 25 UG/DL (ref 59–158)
KETONES, URINE: NEGATIVE MG/DL
LACTATE DEHYDROGENASE: 107 U/L (ref 91–215)
LACTIC ACID: 1.8 MMOL/L (ref 0.5–1.9)
LEUKOCYTE ESTERASE, URINE: NEGATIVE
MAGNESIUM: 1.6 MG/DL (ref 1.6–2.4)
MCH RBC QN AUTO: 26 PG (ref 27–31)
MCHC RBC AUTO-ENTMCNC: 29.5 G/DL (ref 33–37)
MCV RBC AUTO: 88.1 FL (ref 80–94)
METHEMOGLOBIN ARTERIAL: 1.3 %
MICROALBUMIN UR-MCNC: 6.8 MG/DL (ref 0–19)
MICROALBUMIN/CREAT UR-RTO: 94.7 MG/G
NITRITE, URINE: NEGATIVE
O2 CONTENT ARTERIAL: 13 ML/DL
O2 SAT, ARTERIAL: 95.3 %
O2 THERAPY: ABNORMAL
OSMOLALITY URINE: 440 MOSM/KG (ref 250–1200)
PCO2 ARTERIAL: 26 MMHG (ref 35–45)
PDW BLD-RTO: 18.5 % (ref 11.5–14.5)
PH ARTERIAL: 7.47 (ref 7.35–7.45)
PH UA: 6.5 (ref 5–8)
PLATELET # BLD: 439 K/UL (ref 130–400)
PMV BLD AUTO: 11.7 FL (ref 9.4–12.4)
PO2 ARTERIAL: 83 MMHG (ref 80–100)
POTASSIUM SERPL-SCNC: 5 MMOL/L (ref 3.5–5)
POTASSIUM, WHOLE BLOOD: 4.2
PROTEIN UA: 30 MG/DL
RBC # BLD: 3.27 M/UL (ref 4.7–6.1)
RBC UA: 6 /HPF (ref 0–4)
RETICULOCYTE ABSOLUTE COUNT: 0.06 M/UL (ref 0.03–0.12)
RETICULOCYTE COUNT PCT: 1.68 % (ref 0.5–1.5)
SODIUM BLD-SCNC: 130 MMOL/L (ref 136–145)
SODIUM URINE: 62 MMOL/L
SPECIFIC GRAVITY UA: 1.02 (ref 1–1.03)
TOTAL IRON BINDING CAPACITY: 116 UG/DL (ref 250–400)
TOTAL PROTEIN: 6.3 G/DL (ref 6.6–8.7)
TROPONIN: 0.06 NG/ML (ref 0–0.03)
TROPONIN: 0.06 NG/ML (ref 0–0.03)
TSH SERPL DL<=0.05 MIU/L-ACNC: 1.47 UIU/ML (ref 0.27–4.2)
UREA NITROGEN, UR: 653 MG/DL
UROBILINOGEN, URINE: 1 E.U./DL
WBC # BLD: 19.8 K/UL (ref 4.8–10.8)
WBC UA: 2 /HPF (ref 0–5)

## 2019-03-13 PROCEDURE — 2000000000 HC ICU R&B

## 2019-03-13 PROCEDURE — 81001 URINALYSIS AUTO W/SCOPE: CPT

## 2019-03-13 PROCEDURE — 43235 EGD DIAGNOSTIC BRUSH WASH: CPT | Performed by: INTERNAL MEDICINE

## 2019-03-13 PROCEDURE — 36600 WITHDRAWAL OF ARTERIAL BLOOD: CPT

## 2019-03-13 PROCEDURE — 83550 IRON BINDING TEST: CPT

## 2019-03-13 PROCEDURE — 2500000003 HC RX 250 WO HCPCS: Performed by: INTERNAL MEDICINE

## 2019-03-13 PROCEDURE — C9113 INJ PANTOPRAZOLE SODIUM, VIA: HCPCS | Performed by: INTERNAL MEDICINE

## 2019-03-13 PROCEDURE — 99291 CRITICAL CARE FIRST HOUR: CPT | Performed by: INTERNAL MEDICINE

## 2019-03-13 PROCEDURE — 2580000003 HC RX 258: Performed by: INTERNAL MEDICINE

## 2019-03-13 PROCEDURE — 2500000003 HC RX 250 WO HCPCS: Performed by: NURSE ANESTHETIST, CERTIFIED REGISTERED

## 2019-03-13 PROCEDURE — 82803 BLOOD GASES ANY COMBINATION: CPT

## 2019-03-13 PROCEDURE — 82570 ASSAY OF URINE CREATININE: CPT

## 2019-03-13 PROCEDURE — 87040 BLOOD CULTURE FOR BACTERIA: CPT

## 2019-03-13 PROCEDURE — 2580000003 HC RX 258: Performed by: NURSE ANESTHETIST, CERTIFIED REGISTERED

## 2019-03-13 PROCEDURE — 93005 ELECTROCARDIOGRAM TRACING: CPT

## 2019-03-13 PROCEDURE — 87086 URINE CULTURE/COLONY COUNT: CPT

## 2019-03-13 PROCEDURE — 83010 ASSAY OF HAPTOGLOBIN QUANT: CPT

## 2019-03-13 PROCEDURE — 83615 LACTATE (LD) (LDH) ENZYME: CPT

## 2019-03-13 PROCEDURE — 3609020800 HC EGD W/EUS FNA: Performed by: INTERNAL MEDICINE

## 2019-03-13 PROCEDURE — 84300 ASSAY OF URINE SODIUM: CPT

## 2019-03-13 PROCEDURE — 94002 VENT MGMT INPAT INIT DAY: CPT

## 2019-03-13 PROCEDURE — 6360000002 HC RX W HCPCS: Performed by: INTERNAL MEDICINE

## 2019-03-13 PROCEDURE — 5A1945Z RESPIRATORY VENTILATION, 24-96 CONSECUTIVE HOURS: ICD-10-PCS | Performed by: INTERNAL MEDICINE

## 2019-03-13 PROCEDURE — 6360000002 HC RX W HCPCS: Performed by: NURSE ANESTHETIST, CERTIFIED REGISTERED

## 2019-03-13 PROCEDURE — 83605 ASSAY OF LACTIC ACID: CPT

## 2019-03-13 PROCEDURE — 83735 ASSAY OF MAGNESIUM: CPT

## 2019-03-13 PROCEDURE — 0DJ08ZZ INSPECTION OF UPPER INTESTINAL TRACT, VIA NATURAL OR ARTIFICIAL OPENING ENDOSCOPIC: ICD-10-PCS | Performed by: INTERNAL MEDICINE

## 2019-03-13 PROCEDURE — 84132 ASSAY OF SERUM POTASSIUM: CPT

## 2019-03-13 PROCEDURE — 93458 L HRT ARTERY/VENTRICLE ANGIO: CPT | Performed by: INTERNAL MEDICINE

## 2019-03-13 PROCEDURE — 6370000000 HC RX 637 (ALT 250 FOR IP): Performed by: INTERNAL MEDICINE

## 2019-03-13 PROCEDURE — 71045 X-RAY EXAM CHEST 1 VIEW: CPT

## 2019-03-13 PROCEDURE — 82728 ASSAY OF FERRITIN: CPT

## 2019-03-13 PROCEDURE — 84540 ASSAY OF URINE/UREA-N: CPT

## 2019-03-13 PROCEDURE — 83935 ASSAY OF URINE OSMOLALITY: CPT

## 2019-03-13 PROCEDURE — 3700000000 HC ANESTHESIA ATTENDED CARE: Performed by: INTERNAL MEDICINE

## 2019-03-13 PROCEDURE — 3700000001 HC ADD 15 MINUTES (ANESTHESIA): Performed by: INTERNAL MEDICINE

## 2019-03-13 PROCEDURE — 85045 AUTOMATED RETICULOCYTE COUNT: CPT

## 2019-03-13 PROCEDURE — 82043 UR ALBUMIN QUANTITATIVE: CPT

## 2019-03-13 PROCEDURE — 99223 1ST HOSP IP/OBS HIGH 75: CPT | Performed by: INTERNAL MEDICINE

## 2019-03-13 PROCEDURE — 84443 ASSAY THYROID STIM HORMONE: CPT

## 2019-03-13 PROCEDURE — 84484 ASSAY OF TROPONIN QUANT: CPT

## 2019-03-13 PROCEDURE — 83540 ASSAY OF IRON: CPT

## 2019-03-13 PROCEDURE — 80053 COMPREHEN METABOLIC PANEL: CPT

## 2019-03-13 PROCEDURE — 2700000000 HC OXYGEN THERAPY PER DAY

## 2019-03-13 PROCEDURE — 85027 COMPLETE CBC AUTOMATED: CPT

## 2019-03-13 PROCEDURE — 36415 COLL VENOUS BLD VENIPUNCTURE: CPT

## 2019-03-13 RX ORDER — PANTOPRAZOLE SODIUM 40 MG/10ML
40 INJECTION, POWDER, LYOPHILIZED, FOR SOLUTION INTRAVENOUS DAILY
Status: DISCONTINUED | OUTPATIENT
Start: 2019-03-13 | End: 2019-03-18 | Stop reason: HOSPADM

## 2019-03-13 RX ORDER — PROPOFOL 10 MG/ML
INJECTION, EMULSION INTRAVENOUS CONTINUOUS PRN
Status: DISCONTINUED | OUTPATIENT
Start: 2019-03-13 | End: 2019-03-13 | Stop reason: SDUPTHER

## 2019-03-13 RX ORDER — ASPIRIN 81 MG/1
81 TABLET, CHEWABLE ORAL DAILY
Status: DISCONTINUED | OUTPATIENT
Start: 2019-03-14 | End: 2019-03-18 | Stop reason: HOSPADM

## 2019-03-13 RX ORDER — ONDANSETRON 2 MG/ML
4 INJECTION INTRAMUSCULAR; INTRAVENOUS EVERY 6 HOURS PRN
Status: DISCONTINUED | OUTPATIENT
Start: 2019-03-13 | End: 2019-03-18 | Stop reason: HOSPADM

## 2019-03-13 RX ORDER — SODIUM CHLORIDE 0.9 % (FLUSH) 0.9 %
10 SYRINGE (ML) INJECTION PRN
Status: DISCONTINUED | OUTPATIENT
Start: 2019-03-13 | End: 2019-03-18 | Stop reason: HOSPADM

## 2019-03-13 RX ORDER — ACETAMINOPHEN 325 MG/1
650 TABLET ORAL EVERY 4 HOURS PRN
Status: DISCONTINUED | OUTPATIENT
Start: 2019-03-13 | End: 2019-03-18 | Stop reason: HOSPADM

## 2019-03-13 RX ORDER — METRONIDAZOLE 500 MG/1
500 TABLET ORAL EVERY 8 HOURS SCHEDULED
Status: DISCONTINUED | OUTPATIENT
Start: 2019-03-13 | End: 2019-03-16

## 2019-03-13 RX ORDER — PROPOFOL 10 MG/ML
10 INJECTION, EMULSION INTRAVENOUS CONTINUOUS
Status: DISCONTINUED | OUTPATIENT
Start: 2019-03-13 | End: 2019-03-14

## 2019-03-13 RX ORDER — AMIODARONE HYDROCHLORIDE 50 MG/ML
INJECTION, SOLUTION INTRAVENOUS PRN
Status: DISCONTINUED | OUTPATIENT
Start: 2019-03-13 | End: 2019-03-13 | Stop reason: SDUPTHER

## 2019-03-13 RX ORDER — SODIUM CHLORIDE 0.9 % (FLUSH) 0.9 %
10 SYRINGE (ML) INJECTION EVERY 12 HOURS SCHEDULED
Status: DISCONTINUED | OUTPATIENT
Start: 2019-03-13 | End: 2019-03-18 | Stop reason: HOSPADM

## 2019-03-13 RX ORDER — LANOLIN ALCOHOL/MO/W.PET/CERES
3 CREAM (GRAM) TOPICAL NIGHTLY PRN
COMMUNITY
End: 2019-05-06

## 2019-03-13 RX ORDER — CHLORHEXIDINE GLUCONATE 0.12 MG/ML
15 RINSE ORAL 2 TIMES DAILY
Status: DISCONTINUED | OUTPATIENT
Start: 2019-03-13 | End: 2019-03-14

## 2019-03-13 RX ORDER — AMIODARONE HYDROCHLORIDE 50 MG/ML
300 INJECTION, SOLUTION INTRAVENOUS ONCE
Status: DISCONTINUED | OUTPATIENT
Start: 2019-03-13 | End: 2019-03-13 | Stop reason: SDUPTHER

## 2019-03-13 RX ORDER — LIDOCAINE HYDROCHLORIDE 20 MG/ML
INJECTION, SOLUTION INFILTRATION; PERINEURAL PRN
Status: DISCONTINUED | OUTPATIENT
Start: 2019-03-13 | End: 2019-03-13 | Stop reason: SDUPTHER

## 2019-03-13 RX ORDER — SODIUM CHLORIDE, SODIUM LACTATE, POTASSIUM CHLORIDE, CALCIUM CHLORIDE 600; 310; 30; 20 MG/100ML; MG/100ML; MG/100ML; MG/100ML
INJECTION, SOLUTION INTRAVENOUS CONTINUOUS
Status: DISCONTINUED | OUTPATIENT
Start: 2019-03-13 | End: 2019-03-14

## 2019-03-13 RX ORDER — LIDOCAINE HYDROCHLORIDE 10 MG/ML
1 INJECTION, SOLUTION EPIDURAL; INFILTRATION; INTRACAUDAL; PERINEURAL ONCE
Status: DISCONTINUED | OUTPATIENT
Start: 2019-03-13 | End: 2019-03-13 | Stop reason: HOSPADM

## 2019-03-13 RX ORDER — FENTANYL CITRATE 50 UG/ML
INJECTION, SOLUTION INTRAMUSCULAR; INTRAVENOUS PRN
Status: DISCONTINUED | OUTPATIENT
Start: 2019-03-13 | End: 2019-03-13 | Stop reason: SDUPTHER

## 2019-03-13 RX ORDER — 0.9 % SODIUM CHLORIDE 0.9 %
10 VIAL (ML) INJECTION DAILY
Status: DISCONTINUED | OUTPATIENT
Start: 2019-03-13 | End: 2019-03-18 | Stop reason: HOSPADM

## 2019-03-13 RX ORDER — MAGNESIUM SULFATE 1 G/100ML
1 INJECTION INTRAVENOUS ONCE
Status: COMPLETED | OUTPATIENT
Start: 2019-03-13 | End: 2019-03-13

## 2019-03-13 RX ORDER — ROCURONIUM BROMIDE 10 MG/ML
INJECTION, SOLUTION INTRAVENOUS PRN
Status: DISCONTINUED | OUTPATIENT
Start: 2019-03-13 | End: 2019-03-13 | Stop reason: SDUPTHER

## 2019-03-13 RX ORDER — SUCCINYLCHOLINE/SOD CL,ISO/PF 100 MG/5ML
SYRINGE (ML) INTRAVENOUS PRN
Status: DISCONTINUED | OUTPATIENT
Start: 2019-03-13 | End: 2019-03-13 | Stop reason: SDUPTHER

## 2019-03-13 RX ORDER — ATENOLOL 100 MG/1
100 TABLET ORAL DAILY
Status: DISCONTINUED | OUTPATIENT
Start: 2019-03-14 | End: 2019-03-13

## 2019-03-13 RX ORDER — MIDAZOLAM HYDROCHLORIDE 1 MG/ML
INJECTION INTRAMUSCULAR; INTRAVENOUS PRN
Status: DISCONTINUED | OUTPATIENT
Start: 2019-03-13 | End: 2019-03-13 | Stop reason: SDUPTHER

## 2019-03-13 RX ADMIN — ROCURONIUM BROMIDE 30 MG: 10 INJECTION INTRAVENOUS at 14:22

## 2019-03-13 RX ADMIN — SODIUM CHLORIDE, POTASSIUM CHLORIDE, SODIUM LACTATE AND CALCIUM CHLORIDE: 600; 310; 30; 20 INJECTION, SOLUTION INTRAVENOUS at 13:19

## 2019-03-13 RX ADMIN — AMIODARONE HYDROCHLORIDE 150 MG: 50 INJECTION, SOLUTION INTRAVENOUS at 14:00

## 2019-03-13 RX ADMIN — MAGNESIUM SULFATE HEPTAHYDRATE 1 G: 1 INJECTION, SOLUTION INTRAVENOUS at 17:44

## 2019-03-13 RX ADMIN — AMIODARONE HYDROCHLORIDE 0.5 MG/MIN: 50 INJECTION, SOLUTION INTRAVENOUS at 22:35

## 2019-03-13 RX ADMIN — LIDOCAINE HYDROCHLORIDE 40 MG: 20 INJECTION, SOLUTION INFILTRATION; PERINEURAL at 13:48

## 2019-03-13 RX ADMIN — PROPOFOL 45 MCG/KG/MIN: 10 INJECTION, EMULSION INTRAVENOUS at 20:18

## 2019-03-13 RX ADMIN — AZTREONAM 1 G: 1 INJECTION, SOLUTION INTRAVENOUS at 17:42

## 2019-03-13 RX ADMIN — PANTOPRAZOLE SODIUM 40 MG: 40 INJECTION, POWDER, FOR SOLUTION INTRAVENOUS at 17:42

## 2019-03-13 RX ADMIN — PROPOFOL 15 MCG/KG/MIN: 10 INJECTION, EMULSION INTRAVENOUS at 15:22

## 2019-03-13 RX ADMIN — FENTANYL CITRATE 50 MCG: 50 INJECTION INTRAMUSCULAR; INTRAVENOUS at 13:48

## 2019-03-13 RX ADMIN — METOPROLOL TARTRATE 12.5 MG: 25 TABLET ORAL at 20:18

## 2019-03-13 RX ADMIN — Medication 10 ML: at 20:18

## 2019-03-13 RX ADMIN — AMIODARONE HYDROCHLORIDE 1 MG/MIN: 50 INJECTION, SOLUTION INTRAVENOUS at 14:17

## 2019-03-13 RX ADMIN — ENOXAPARIN SODIUM 40 MG: 40 INJECTION SUBCUTANEOUS at 17:43

## 2019-03-13 RX ADMIN — MIDAZOLAM 1 MG: 1 INJECTION INTRAMUSCULAR; INTRAVENOUS at 13:46

## 2019-03-13 RX ADMIN — Medication 100 MG: at 14:00

## 2019-03-13 RX ADMIN — CHLORHEXIDINE GLUCONATE 15 ML: 1.2 RINSE ORAL at 21:00

## 2019-03-13 RX ADMIN — METRONIDAZOLE 500 MG: 500 TABLET ORAL at 17:43

## 2019-03-13 RX ADMIN — PROPOFOL 75 MCG/KG/MIN: 10 INJECTION, EMULSION INTRAVENOUS at 13:48

## 2019-03-13 RX ADMIN — METRONIDAZOLE 500 MG: 500 TABLET ORAL at 22:30

## 2019-03-13 RX ADMIN — Medication 10 ML: at 17:43

## 2019-03-13 RX ADMIN — AMIODARONE HYDROCHLORIDE 150 MG: 50 INJECTION, SOLUTION INTRAVENOUS at 14:11

## 2019-03-13 ASSESSMENT — PULMONARY FUNCTION TESTS
PIF_VALUE: 19.4
PIF_VALUE: 20
PIF_VALUE: 18.6
PIF_VALUE: 27.5
PIF_VALUE: 19.5
PIF_VALUE: 19.1
PIF_VALUE: 19.6
PIF_VALUE: 20.6
PIF_VALUE: 20.9

## 2019-03-13 ASSESSMENT — PAIN - FUNCTIONAL ASSESSMENT: PAIN_FUNCTIONAL_ASSESSMENT: 0-10

## 2019-03-14 ENCOUNTER — APPOINTMENT (OUTPATIENT)
Dept: GENERAL RADIOLOGY | Age: 79
DRG: 286 | End: 2019-03-14
Attending: INTERNAL MEDICINE
Payer: MEDICARE

## 2019-03-14 ENCOUNTER — APPOINTMENT (OUTPATIENT)
Dept: ULTRASOUND IMAGING | Age: 79
DRG: 286 | End: 2019-03-14
Attending: INTERNAL MEDICINE
Payer: MEDICARE

## 2019-03-14 ENCOUNTER — OUTSIDE FACILITY SERVICE (OUTPATIENT)
Dept: PULMONOLOGY | Facility: CLINIC | Age: 79
End: 2019-03-14

## 2019-03-14 LAB
ALBUMIN SERPL-MCNC: 1.9 G/DL (ref 3.5–5.2)
ALP BLD-CCNC: 76 U/L (ref 40–130)
ALT SERPL-CCNC: 8 U/L (ref 5–41)
AMORPHOUS: ABNORMAL /HPF
ANION GAP SERPL CALCULATED.3IONS-SCNC: 12 MMOL/L (ref 7–19)
APTT: 36.9 SEC (ref 26–36.2)
AST SERPL-CCNC: 11 U/L (ref 5–40)
BACTERIA: ABNORMAL /HPF
BASE EXCESS ARTERIAL: -4.3 MMOL/L (ref -2–2)
BASOPHILS ABSOLUTE: 0.1 K/UL (ref 0–0.2)
BASOPHILS RELATIVE PERCENT: 0.2 % (ref 0–1)
BILIRUB SERPL-MCNC: 0.3 MG/DL (ref 0.2–1.2)
BILIRUBIN URINE: NEGATIVE
BLOOD, URINE: NEGATIVE
BUN BLDV-MCNC: 42 MG/DL (ref 8–23)
CALCIUM SERPL-MCNC: 10.1 MG/DL (ref 8.8–10.2)
CARBOXYHEMOGLOBIN ARTERIAL: 1.7 % (ref 0–5)
CHLORIDE BLD-SCNC: 105 MMOL/L (ref 98–111)
CLARITY: ABNORMAL
CO2: 17 MMOL/L (ref 22–29)
COLOR: YELLOW
CREAT SERPL-MCNC: 2.4 MG/DL (ref 0.5–1.2)
CREATININE URINE: 75.4 MG/DL (ref 4.2–622)
EKG P AXIS: 37 DEGREES
EKG P-R INTERVAL: 146 MS
EKG Q-T INTERVAL: 352 MS
EKG QRS DURATION: 86 MS
EKG QTC CALCULATION (BAZETT): 392 MS
EKG T AXIS: 43 DEGREES
EOSINOPHILS ABSOLUTE: 0.5 K/UL (ref 0–0.6)
EOSINOPHILS RELATIVE PERCENT: 2.1 % (ref 0–5)
FOLATE: >20 NG/ML (ref 4.5–32.2)
GFR NON-AFRICAN AMERICAN: 26
GLUCOSE BLD-MCNC: 120 MG/DL (ref 74–109)
GLUCOSE URINE: NEGATIVE MG/DL
HCO3 ARTERIAL: 18.8 MMOL/L (ref 22–26)
HCT VFR BLD CALC: 29.3 % (ref 42–52)
HEMOGLOBIN, ART, EXTENDED: 9.3 G/DL (ref 14–18)
HEMOGLOBIN: 8.5 G/DL (ref 14–18)
INR BLD: 1.35 (ref 0.88–1.18)
KETONES, URINE: NEGATIVE MG/DL
LEUKOCYTE ESTERASE, URINE: NEGATIVE
LV EF: 50 %
LVEF MODALITY: NORMAL
LYMPHOCYTES ABSOLUTE: 1.1 K/UL (ref 1.1–4.5)
LYMPHOCYTES RELATIVE PERCENT: 4.9 % (ref 20–40)
MAGNESIUM: 2.2 MG/DL (ref 1.6–2.4)
MCH RBC QN AUTO: 25.4 PG (ref 27–31)
MCHC RBC AUTO-ENTMCNC: 29 G/DL (ref 33–37)
MCV RBC AUTO: 87.5 FL (ref 80–94)
METHEMOGLOBIN ARTERIAL: 1.8 %
MICROALBUMIN UR-MCNC: 3.9 MG/DL (ref 0–19)
MICROALBUMIN/CREAT UR-RTO: 51.7 MG/G
MONOCYTES ABSOLUTE: 1.5 K/UL (ref 0–0.9)
MONOCYTES RELATIVE PERCENT: 6.6 % (ref 0–10)
NEUTROPHILS ABSOLUTE: 18.7 K/UL (ref 1.5–7.5)
NEUTROPHILS RELATIVE PERCENT: 85.2 % (ref 50–65)
NITRITE, URINE: NEGATIVE
O2 CONTENT ARTERIAL: 12.7 ML/DL
O2 SAT, ARTERIAL: 95.5 %
O2 THERAPY: ABNORMAL
PARATHYROID HORMONE INTACT: 20.2 PG/ML (ref 15–65)
PCO2 ARTERIAL: 27 MMHG (ref 35–45)
PDW BLD-RTO: 18 % (ref 11.5–14.5)
PH ARTERIAL: 7.45 (ref 7.35–7.45)
PH UA: 6 (ref 5–8)
PLATELET # BLD: 486 K/UL (ref 130–400)
PMV BLD AUTO: 10.8 FL (ref 9.4–12.4)
PO2 ARTERIAL: 103 MMHG (ref 80–100)
POTASSIUM REFLEX MAGNESIUM: 4.5 MMOL/L (ref 3.5–5)
POTASSIUM, WHOLE BLOOD: 4.3
PROTEIN UA: 30 MG/DL
PROTHROMBIN TIME: 16 SEC (ref 12–14.6)
RBC # BLD: 3.35 M/UL (ref 4.7–6.1)
RBC UA: ABNORMAL /HPF (ref 0–2)
SODIUM BLD-SCNC: 134 MMOL/L (ref 136–145)
SODIUM URINE: 41 MMOL/L
SPECIFIC GRAVITY UA: 1.02 (ref 1–1.03)
TOTAL PROTEIN: 6.8 G/DL (ref 6.6–8.7)
TROPONIN: 0.08 NG/ML (ref 0–0.03)
UROBILINOGEN, URINE: 0.2 E.U./DL
VITAMIN B-12: 945 PG/ML (ref 211–946)
WBC # BLD: 22 K/UL (ref 4.8–10.8)
WBC UA: ABNORMAL /HPF (ref 0–5)

## 2019-03-14 PROCEDURE — 2580000003 HC RX 258: Performed by: INTERNAL MEDICINE

## 2019-03-14 PROCEDURE — 82607 VITAMIN B-12: CPT

## 2019-03-14 PROCEDURE — 84300 ASSAY OF URINE SODIUM: CPT

## 2019-03-14 PROCEDURE — 2700000000 HC OXYGEN THERAPY PER DAY

## 2019-03-14 PROCEDURE — 87205 SMEAR GRAM STAIN: CPT

## 2019-03-14 PROCEDURE — 94003 VENT MGMT INPAT SUBQ DAY: CPT

## 2019-03-14 PROCEDURE — 82570 ASSAY OF URINE CREATININE: CPT

## 2019-03-14 PROCEDURE — 84484 ASSAY OF TROPONIN QUANT: CPT

## 2019-03-14 PROCEDURE — 99233 SBSQ HOSP IP/OBS HIGH 50: CPT | Performed by: INTERNAL MEDICINE

## 2019-03-14 PROCEDURE — 71045 X-RAY EXAM CHEST 1 VIEW: CPT

## 2019-03-14 PROCEDURE — 85025 COMPLETE CBC W/AUTO DIFF WBC: CPT

## 2019-03-14 PROCEDURE — 84132 ASSAY OF SERUM POTASSIUM: CPT

## 2019-03-14 PROCEDURE — 2500000003 HC RX 250 WO HCPCS: Performed by: INTERNAL MEDICINE

## 2019-03-14 PROCEDURE — 36415 COLL VENOUS BLD VENIPUNCTURE: CPT

## 2019-03-14 PROCEDURE — 83970 ASSAY OF PARATHORMONE: CPT

## 2019-03-14 PROCEDURE — 83735 ASSAY OF MAGNESIUM: CPT

## 2019-03-14 PROCEDURE — 2000000000 HC ICU R&B

## 2019-03-14 PROCEDURE — 76770 US EXAM ABDO BACK WALL COMP: CPT

## 2019-03-14 PROCEDURE — 87070 CULTURE OTHR SPECIMN AEROBIC: CPT

## 2019-03-14 PROCEDURE — 82803 BLOOD GASES ANY COMBINATION: CPT

## 2019-03-14 PROCEDURE — 85730 THROMBOPLASTIN TIME PARTIAL: CPT

## 2019-03-14 PROCEDURE — 93306 TTE W/DOPPLER COMPLETE: CPT

## 2019-03-14 PROCEDURE — 82668 ASSAY OF ERYTHROPOIETIN: CPT

## 2019-03-14 PROCEDURE — 82746 ASSAY OF FOLIC ACID SERUM: CPT

## 2019-03-14 PROCEDURE — 82043 UR ALBUMIN QUANTITATIVE: CPT

## 2019-03-14 PROCEDURE — 80053 COMPREHEN METABOLIC PANEL: CPT

## 2019-03-14 PROCEDURE — 6360000002 HC RX W HCPCS: Performed by: INTERNAL MEDICINE

## 2019-03-14 PROCEDURE — 6370000000 HC RX 637 (ALT 250 FOR IP): Performed by: INTERNAL MEDICINE

## 2019-03-14 PROCEDURE — 36600 WITHDRAWAL OF ARTERIAL BLOOD: CPT

## 2019-03-14 PROCEDURE — 81001 URINALYSIS AUTO W/SCOPE: CPT

## 2019-03-14 PROCEDURE — C9113 INJ PANTOPRAZOLE SODIUM, VIA: HCPCS | Performed by: INTERNAL MEDICINE

## 2019-03-14 PROCEDURE — 85610 PROTHROMBIN TIME: CPT

## 2019-03-14 RX ORDER — SODIUM CHLORIDE 9 MG/ML
INJECTION, SOLUTION INTRAVENOUS CONTINUOUS
Status: CANCELLED | OUTPATIENT
Start: 2019-03-15

## 2019-03-14 RX ORDER — VANCOMYCIN HYDROCHLORIDE 1 G/200ML
1000 INJECTION, SOLUTION INTRAVENOUS ONCE
Status: COMPLETED | OUTPATIENT
Start: 2019-03-14 | End: 2019-03-14

## 2019-03-14 RX ADMIN — Medication: at 21:29

## 2019-03-14 RX ADMIN — Medication: at 09:22

## 2019-03-14 RX ADMIN — PROPOFOL 50 MCG/KG/MIN: 10 INJECTION, EMULSION INTRAVENOUS at 02:22

## 2019-03-14 RX ADMIN — AZTREONAM 1 G: 1 INJECTION, SOLUTION INTRAVENOUS at 15:30

## 2019-03-14 RX ADMIN — METRONIDAZOLE 500 MG: 500 TABLET ORAL at 05:30

## 2019-03-14 RX ADMIN — Medication 10 ML: at 09:22

## 2019-03-14 RX ADMIN — PROPOFOL 50 MCG/KG/MIN: 10 INJECTION, EMULSION INTRAVENOUS at 10:49

## 2019-03-14 RX ADMIN — METOPROLOL TARTRATE 12.5 MG: 25 TABLET ORAL at 20:58

## 2019-03-14 RX ADMIN — AZTREONAM 1 G: 1 INJECTION, SOLUTION INTRAVENOUS at 09:21

## 2019-03-14 RX ADMIN — Medication 10 ML: at 20:48

## 2019-03-14 RX ADMIN — PROPOFOL 50 MCG/KG/MIN: 10 INJECTION, EMULSION INTRAVENOUS at 05:23

## 2019-03-14 RX ADMIN — ENOXAPARIN SODIUM 30 MG: 30 INJECTION SUBCUTANEOUS at 15:13

## 2019-03-14 RX ADMIN — AMIODARONE HYDROCHLORIDE 0.5 MG/MIN: 50 INJECTION, SOLUTION INTRAVENOUS at 11:22

## 2019-03-14 RX ADMIN — VANCOMYCIN HYDROCHLORIDE 1000 MG: 1 INJECTION, SOLUTION INTRAVENOUS at 20:47

## 2019-03-14 RX ADMIN — METRONIDAZOLE 500 MG: 500 TABLET ORAL at 21:32

## 2019-03-14 RX ADMIN — PANTOPRAZOLE SODIUM 40 MG: 40 INJECTION, POWDER, FOR SOLUTION INTRAVENOUS at 09:21

## 2019-03-14 RX ADMIN — AZTREONAM 1 G: 1 INJECTION, SOLUTION INTRAVENOUS at 00:06

## 2019-03-14 RX ADMIN — METOPROLOL TARTRATE 12.5 MG: 25 TABLET ORAL at 09:21

## 2019-03-14 RX ADMIN — ASPIRIN 81 MG 81 MG: 81 TABLET ORAL at 09:21

## 2019-03-14 RX ADMIN — CHLORHEXIDINE GLUCONATE 15 ML: 1.2 RINSE ORAL at 09:22

## 2019-03-14 ASSESSMENT — PULMONARY FUNCTION TESTS
PIF_VALUE: 11.1
PIF_VALUE: 19.4
PIF_VALUE: 31.4
PIF_VALUE: 18.8
PIF_VALUE: 10.7
PIF_VALUE: 20.9
PIF_VALUE: 22.3
PIF_VALUE: 19.6
PIF_VALUE: 18.9
PIF_VALUE: 21.1
PIF_VALUE: 20.9
PIF_VALUE: 19.9
PIF_VALUE: 20.3
PIF_VALUE: 19.5
PIF_VALUE: 20.8

## 2019-03-14 ASSESSMENT — ENCOUNTER SYMPTOMS
NAUSEA: 0
CHEST TIGHTNESS: 0
ABDOMINAL PAIN: 0
DIARRHEA: 0
SHORTNESS OF BREATH: 0

## 2019-03-14 ASSESSMENT — PAIN SCALES - GENERAL: PAINLEVEL_OUTOF10: 0

## 2019-03-15 ENCOUNTER — OUTSIDE FACILITY SERVICE (OUTPATIENT)
Dept: PULMONOLOGY | Facility: CLINIC | Age: 79
End: 2019-03-15

## 2019-03-15 LAB
ALBUMIN SERPL-MCNC: 1.8 G/DL (ref 3.5–5.2)
ALP BLD-CCNC: 67 U/L (ref 40–130)
ALT SERPL-CCNC: 7 U/L (ref 5–41)
ANION GAP SERPL CALCULATED.3IONS-SCNC: 10 MMOL/L (ref 7–19)
AST SERPL-CCNC: 10 U/L (ref 5–40)
BASE EXCESS ARTERIAL: -2 MMOL/L (ref -2–2)
BASOPHILS ABSOLUTE: 0 K/UL (ref 0–0.2)
BASOPHILS RELATIVE PERCENT: 0.2 % (ref 0–1)
BILIRUB SERPL-MCNC: 0.4 MG/DL (ref 0.2–1.2)
BUN BLDV-MCNC: 35 MG/DL (ref 8–23)
CALCIUM SERPL-MCNC: 9.6 MG/DL (ref 8.8–10.2)
CARBOXYHEMOGLOBIN ARTERIAL: 2.6 % (ref 0–5)
CHLORIDE BLD-SCNC: 104 MMOL/L (ref 98–111)
CO2: 20 MMOL/L (ref 22–29)
CREAT SERPL-MCNC: 2 MG/DL (ref 0.5–1.2)
EOSINOPHILS ABSOLUTE: 0.3 K/UL (ref 0–0.6)
EOSINOPHILS RELATIVE PERCENT: 1.8 % (ref 0–5)
GFR NON-AFRICAN AMERICAN: 33
GLUCOSE BLD-MCNC: 93 MG/DL (ref 74–109)
HCO3 ARTERIAL: 21.1 MMOL/L (ref 22–26)
HCT VFR BLD CALC: 28.6 % (ref 42–52)
HEMOGLOBIN, ART, EXTENDED: 9 G/DL (ref 14–18)
HEMOGLOBIN: 8.4 G/DL (ref 14–18)
LYMPHOCYTES ABSOLUTE: 1.4 K/UL (ref 1.1–4.5)
LYMPHOCYTES RELATIVE PERCENT: 7.8 % (ref 20–40)
MCH RBC QN AUTO: 25.5 PG (ref 27–31)
MCHC RBC AUTO-ENTMCNC: 29.4 G/DL (ref 33–37)
MCV RBC AUTO: 86.7 FL (ref 80–94)
METHEMOGLOBIN ARTERIAL: 1.2 %
MONOCYTES ABSOLUTE: 1.4 K/UL (ref 0–0.9)
MONOCYTES RELATIVE PERCENT: 7.9 % (ref 0–10)
NEUTROPHILS ABSOLUTE: 14.4 K/UL (ref 1.5–7.5)
NEUTROPHILS RELATIVE PERCENT: 81.2 % (ref 50–65)
O2 CONTENT ARTERIAL: 11.7 ML/DL
O2 SAT, ARTERIAL: 92 %
O2 THERAPY: ABNORMAL
PCO2 ARTERIAL: 29 MMHG (ref 35–45)
PDW BLD-RTO: 17.8 % (ref 11.5–14.5)
PH ARTERIAL: 7.47 (ref 7.35–7.45)
PLATELET # BLD: 460 K/UL (ref 130–400)
PMV BLD AUTO: 10.8 FL (ref 9.4–12.4)
PO2 ARTERIAL: 62 MMHG (ref 80–100)
POTASSIUM SERPL-SCNC: 3.9 MMOL/L (ref 3.5–5)
POTASSIUM, WHOLE BLOOD: 3.7
RBC # BLD: 3.3 M/UL (ref 4.7–6.1)
SODIUM BLD-SCNC: 134 MMOL/L (ref 136–145)
TOTAL PROTEIN: 6.5 G/DL (ref 6.6–8.7)
URINE CULTURE, ROUTINE: NORMAL
WBC # BLD: 17.7 K/UL (ref 4.8–10.8)

## 2019-03-15 PROCEDURE — C1760 CLOSURE DEV, VASC: HCPCS

## 2019-03-15 PROCEDURE — 82803 BLOOD GASES ANY COMBINATION: CPT

## 2019-03-15 PROCEDURE — 2580000003 HC RX 258: Performed by: INTERNAL MEDICINE

## 2019-03-15 PROCEDURE — 6360000002 HC RX W HCPCS: Performed by: INTERNAL MEDICINE

## 2019-03-15 PROCEDURE — B2111ZZ FLUOROSCOPY OF MULTIPLE CORONARY ARTERIES USING LOW OSMOLAR CONTRAST: ICD-10-PCS | Performed by: INTERNAL MEDICINE

## 2019-03-15 PROCEDURE — C1894 INTRO/SHEATH, NON-LASER: HCPCS

## 2019-03-15 PROCEDURE — 99232 SBSQ HOSP IP/OBS MODERATE 35: CPT | Performed by: INTERNAL MEDICINE

## 2019-03-15 PROCEDURE — 2500000003 HC RX 250 WO HCPCS: Performed by: INTERNAL MEDICINE

## 2019-03-15 PROCEDURE — 4A023N7 MEASUREMENT OF CARDIAC SAMPLING AND PRESSURE, LEFT HEART, PERCUTANEOUS APPROACH: ICD-10-PCS | Performed by: INTERNAL MEDICINE

## 2019-03-15 PROCEDURE — 36415 COLL VENOUS BLD VENIPUNCTURE: CPT

## 2019-03-15 PROCEDURE — 92610 EVALUATE SWALLOWING FUNCTION: CPT

## 2019-03-15 PROCEDURE — 99152 MOD SED SAME PHYS/QHP 5/>YRS: CPT | Performed by: INTERNAL MEDICINE

## 2019-03-15 PROCEDURE — 85025 COMPLETE CBC W/AUTO DIFF WBC: CPT

## 2019-03-15 PROCEDURE — 6370000000 HC RX 637 (ALT 250 FOR IP): Performed by: INTERNAL MEDICINE

## 2019-03-15 PROCEDURE — C9113 INJ PANTOPRAZOLE SODIUM, VIA: HCPCS | Performed by: INTERNAL MEDICINE

## 2019-03-15 PROCEDURE — 6360000004 HC RX CONTRAST MEDICATION: Performed by: INTERNAL MEDICINE

## 2019-03-15 PROCEDURE — 80053 COMPREHEN METABOLIC PANEL: CPT

## 2019-03-15 PROCEDURE — 2700000000 HC OXYGEN THERAPY PER DAY

## 2019-03-15 PROCEDURE — B41F1ZZ FLUOROSCOPY OF RIGHT LOWER EXTREMITY ARTERIES USING LOW OSMOLAR CONTRAST: ICD-10-PCS | Performed by: INTERNAL MEDICINE

## 2019-03-15 PROCEDURE — 84132 ASSAY OF SERUM POTASSIUM: CPT

## 2019-03-15 PROCEDURE — 99221 1ST HOSP IP/OBS SF/LOW 40: CPT | Performed by: PHYSICIAN ASSISTANT

## 2019-03-15 PROCEDURE — 93458 L HRT ARTERY/VENTRICLE ANGIO: CPT | Performed by: INTERNAL MEDICINE

## 2019-03-15 PROCEDURE — 2709999900 HC NON-CHARGEABLE SUPPLY

## 2019-03-15 PROCEDURE — 2000000000 HC ICU R&B

## 2019-03-15 PROCEDURE — 99233 SBSQ HOSP IP/OBS HIGH 50: CPT | Performed by: INTERNAL MEDICINE

## 2019-03-15 PROCEDURE — 36600 WITHDRAWAL OF ARTERIAL BLOOD: CPT

## 2019-03-15 PROCEDURE — B2151ZZ FLUOROSCOPY OF LEFT HEART USING LOW OSMOLAR CONTRAST: ICD-10-PCS | Performed by: INTERNAL MEDICINE

## 2019-03-15 RX ORDER — SODIUM CHLORIDE 0.9 % (FLUSH) 0.9 %
10 SYRINGE (ML) INJECTION EVERY 12 HOURS SCHEDULED
Status: DISCONTINUED | OUTPATIENT
Start: 2019-03-15 | End: 2019-03-18 | Stop reason: HOSPADM

## 2019-03-15 RX ORDER — SODIUM CHLORIDE 0.9 % (FLUSH) 0.9 %
10 SYRINGE (ML) INJECTION PRN
Status: DISCONTINUED | OUTPATIENT
Start: 2019-03-15 | End: 2019-03-18 | Stop reason: HOSPADM

## 2019-03-15 RX ORDER — IODIXANOL 320 MG/ML
150 INJECTION, SOLUTION INTRAVASCULAR
Status: COMPLETED | OUTPATIENT
Start: 2019-03-15 | End: 2019-03-15

## 2019-03-15 RX ADMIN — Medication 10 ML: at 08:50

## 2019-03-15 RX ADMIN — IODIXANOL 76 ML: 320 INJECTION, SOLUTION INTRAVASCULAR at 19:55

## 2019-03-15 RX ADMIN — METRONIDAZOLE 500 MG: 500 TABLET ORAL at 23:06

## 2019-03-15 RX ADMIN — METOPROLOL TARTRATE 12.5 MG: 25 TABLET ORAL at 08:50

## 2019-03-15 RX ADMIN — Medication 10 ML: at 23:06

## 2019-03-15 RX ADMIN — METOPROLOL TARTRATE 12.5 MG: 25 TABLET ORAL at 23:05

## 2019-03-15 RX ADMIN — PANTOPRAZOLE SODIUM 40 MG: 40 INJECTION, POWDER, FOR SOLUTION INTRAVENOUS at 08:50

## 2019-03-15 RX ADMIN — AZTREONAM 1 G: 1 INJECTION, SOLUTION INTRAVENOUS at 08:49

## 2019-03-15 RX ADMIN — AMIODARONE HYDROCHLORIDE 0.5 MG/MIN: 50 INJECTION, SOLUTION INTRAVENOUS at 08:50

## 2019-03-15 RX ADMIN — Medication: at 10:57

## 2019-03-15 RX ADMIN — METRONIDAZOLE 500 MG: 500 TABLET ORAL at 08:56

## 2019-03-15 RX ADMIN — AZTREONAM 1 G: 1 INJECTION, SOLUTION INTRAVENOUS at 00:14

## 2019-03-15 RX ADMIN — ASPIRIN 81 MG 81 MG: 81 TABLET ORAL at 08:50

## 2019-03-15 RX ADMIN — Medication 10 ML: at 23:07

## 2019-03-15 RX ADMIN — ENOXAPARIN SODIUM 30 MG: 30 INJECTION SUBCUTANEOUS at 15:56

## 2019-03-15 RX ADMIN — AZTREONAM 1 G: 1 INJECTION, SOLUTION INTRAVENOUS at 15:56

## 2019-03-15 RX ADMIN — METRONIDAZOLE 500 MG: 500 TABLET ORAL at 15:55

## 2019-03-15 ASSESSMENT — ENCOUNTER SYMPTOMS
DIARRHEA: 0
SHORTNESS OF BREATH: 0
VOMITING: 0
NAUSEA: 0

## 2019-03-15 ASSESSMENT — PAIN SCALES - GENERAL
PAINLEVEL_OUTOF10: 0

## 2019-03-16 ENCOUNTER — APPOINTMENT (OUTPATIENT)
Dept: GENERAL RADIOLOGY | Age: 79
DRG: 286 | End: 2019-03-16
Attending: INTERNAL MEDICINE
Payer: MEDICARE

## 2019-03-16 ENCOUNTER — APPOINTMENT (OUTPATIENT)
Dept: ULTRASOUND IMAGING | Age: 79
DRG: 286 | End: 2019-03-16
Attending: INTERNAL MEDICINE
Payer: MEDICARE

## 2019-03-16 LAB
ALBUMIN SERPL-MCNC: 1.6 G/DL (ref 3.5–5.2)
ALP BLD-CCNC: 65 U/L (ref 40–130)
ALT SERPL-CCNC: 6 U/L (ref 5–41)
ANION GAP SERPL CALCULATED.3IONS-SCNC: 10 MMOL/L (ref 7–19)
AST SERPL-CCNC: 9 U/L (ref 5–40)
BASE EXCESS ARTERIAL: -1.3 MMOL/L (ref -2–2)
BASOPHILS ABSOLUTE: 0 K/UL (ref 0–0.2)
BASOPHILS RELATIVE PERCENT: 0.2 % (ref 0–1)
BILIRUB SERPL-MCNC: 0.4 MG/DL (ref 0.2–1.2)
BUN BLDV-MCNC: 32 MG/DL (ref 8–23)
CALCIUM SERPL-MCNC: 9.7 MG/DL (ref 8.8–10.2)
CARBOXYHEMOGLOBIN ARTERIAL: 2.5 % (ref 0–5)
CHLORIDE BLD-SCNC: 104 MMOL/L (ref 98–111)
CO2: 20 MMOL/L (ref 22–29)
CREAT SERPL-MCNC: 2 MG/DL (ref 0.5–1.2)
CULTURE, RESPIRATORY: NORMAL
EOSINOPHILS ABSOLUTE: 0.3 K/UL (ref 0–0.6)
EOSINOPHILS RELATIVE PERCENT: 1.7 % (ref 0–5)
ERYTHROPOIETIN: 13 MU/ML (ref 4–27)
GFR NON-AFRICAN AMERICAN: 33
GLUCOSE BLD-MCNC: 88 MG/DL (ref 74–109)
GRAM STAIN RESULT: NORMAL
HCO3 ARTERIAL: 22.2 MMOL/L (ref 22–26)
HCT VFR BLD CALC: 30.5 % (ref 42–52)
HEMOGLOBIN, ART, EXTENDED: 9.3 G/DL (ref 14–18)
HEMOGLOBIN: 9 G/DL (ref 14–18)
LYMPHOCYTES ABSOLUTE: 1.1 K/UL (ref 1.1–4.5)
LYMPHOCYTES RELATIVE PERCENT: 6.8 % (ref 20–40)
MCH RBC QN AUTO: 25.6 PG (ref 27–31)
MCHC RBC AUTO-ENTMCNC: 29.5 G/DL (ref 33–37)
MCV RBC AUTO: 86.6 FL (ref 80–94)
METHEMOGLOBIN ARTERIAL: 1.1 %
MONOCYTES ABSOLUTE: 1.1 K/UL (ref 0–0.9)
MONOCYTES RELATIVE PERCENT: 6.6 % (ref 0–10)
NEUTROPHILS ABSOLUTE: 13.9 K/UL (ref 1.5–7.5)
NEUTROPHILS RELATIVE PERCENT: 83.7 % (ref 50–65)
O2 CONTENT ARTERIAL: 12.2 ML/DL
O2 SAT, ARTERIAL: 92.5 %
O2 THERAPY: ABNORMAL
PCO2 ARTERIAL: 32 MMHG (ref 35–45)
PDW BLD-RTO: 17.8 % (ref 11.5–14.5)
PH ARTERIAL: 7.45 (ref 7.35–7.45)
PLATELET # BLD: 500 K/UL (ref 130–400)
PMV BLD AUTO: 10.4 FL (ref 9.4–12.4)
PO2 ARTERIAL: 65 MMHG (ref 80–100)
POTASSIUM SERPL-SCNC: 3.7 MMOL/L (ref 3.5–5)
POTASSIUM, WHOLE BLOOD: 3.5
RBC # BLD: 3.52 M/UL (ref 4.7–6.1)
SODIUM BLD-SCNC: 134 MMOL/L (ref 136–145)
TOTAL PROTEIN: 6.5 G/DL (ref 6.6–8.7)
WBC # BLD: 16.6 K/UL (ref 4.8–10.8)

## 2019-03-16 PROCEDURE — 99233 SBSQ HOSP IP/OBS HIGH 50: CPT | Performed by: PHYSICIAN ASSISTANT

## 2019-03-16 PROCEDURE — 2500000003 HC RX 250 WO HCPCS: Performed by: INTERNAL MEDICINE

## 2019-03-16 PROCEDURE — 85025 COMPLETE CBC W/AUTO DIFF WBC: CPT

## 2019-03-16 PROCEDURE — 6370000000 HC RX 637 (ALT 250 FOR IP): Performed by: INTERNAL MEDICINE

## 2019-03-16 PROCEDURE — 2580000003 HC RX 258: Performed by: INTERNAL MEDICINE

## 2019-03-16 PROCEDURE — 36415 COLL VENOUS BLD VENIPUNCTURE: CPT

## 2019-03-16 PROCEDURE — 71045 X-RAY EXAM CHEST 1 VIEW: CPT

## 2019-03-16 PROCEDURE — 97116 GAIT TRAINING THERAPY: CPT

## 2019-03-16 PROCEDURE — 84132 ASSAY OF SERUM POTASSIUM: CPT

## 2019-03-16 PROCEDURE — 76705 ECHO EXAM OF ABDOMEN: CPT

## 2019-03-16 PROCEDURE — 80053 COMPREHEN METABOLIC PANEL: CPT

## 2019-03-16 PROCEDURE — 99233 SBSQ HOSP IP/OBS HIGH 50: CPT | Performed by: INTERNAL MEDICINE

## 2019-03-16 PROCEDURE — 6360000002 HC RX W HCPCS: Performed by: INTERNAL MEDICINE

## 2019-03-16 PROCEDURE — 2140000000 HC CCU INTERMEDIATE R&B

## 2019-03-16 PROCEDURE — C9113 INJ PANTOPRAZOLE SODIUM, VIA: HCPCS | Performed by: INTERNAL MEDICINE

## 2019-03-16 PROCEDURE — 2700000000 HC OXYGEN THERAPY PER DAY

## 2019-03-16 PROCEDURE — 97161 PT EVAL LOW COMPLEX 20 MIN: CPT

## 2019-03-16 PROCEDURE — 82803 BLOOD GASES ANY COMBINATION: CPT

## 2019-03-16 PROCEDURE — 36600 WITHDRAWAL OF ARTERIAL BLOOD: CPT

## 2019-03-16 RX ORDER — SODIUM CHLORIDE 9 MG/ML
INJECTION, SOLUTION INTRAVENOUS CONTINUOUS
Status: DISCONTINUED | OUTPATIENT
Start: 2019-03-16 | End: 2019-03-18 | Stop reason: HOSPADM

## 2019-03-16 RX ORDER — AMLODIPINE BESYLATE 10 MG/1
10 TABLET ORAL DAILY
Status: DISCONTINUED | OUTPATIENT
Start: 2019-03-16 | End: 2019-03-18 | Stop reason: HOSPADM

## 2019-03-16 RX ADMIN — SODIUM CHLORIDE: 9 INJECTION, SOLUTION INTRAVENOUS at 12:11

## 2019-03-16 RX ADMIN — METRONIDAZOLE 500 MG: 500 TABLET ORAL at 06:18

## 2019-03-16 RX ADMIN — AZTREONAM 1 G: 1 INJECTION, SOLUTION INTRAVENOUS at 00:05

## 2019-03-16 RX ADMIN — METRONIDAZOLE 500 MG: 500 INJECTION, SOLUTION INTRAVENOUS at 14:10

## 2019-03-16 RX ADMIN — ASPIRIN 81 MG 81 MG: 81 TABLET ORAL at 08:28

## 2019-03-16 RX ADMIN — AMLODIPINE BESYLATE 10 MG: 10 TABLET ORAL at 10:01

## 2019-03-16 RX ADMIN — METRONIDAZOLE 500 MG: 500 INJECTION, SOLUTION INTRAVENOUS at 22:23

## 2019-03-16 RX ADMIN — AZTREONAM 1 G: 1 INJECTION, SOLUTION INTRAVENOUS at 15:39

## 2019-03-16 RX ADMIN — Medication 10 ML: at 10:01

## 2019-03-16 RX ADMIN — PANTOPRAZOLE SODIUM 40 MG: 40 INJECTION, POWDER, FOR SOLUTION INTRAVENOUS at 10:00

## 2019-03-16 RX ADMIN — METOPROLOL TARTRATE 12.5 MG: 25 TABLET ORAL at 08:28

## 2019-03-16 RX ADMIN — Medication 10 ML: at 08:28

## 2019-03-16 RX ADMIN — METOPROLOL TARTRATE 12.5 MG: 25 TABLET ORAL at 19:46

## 2019-03-16 RX ADMIN — AZTREONAM 1 G: 1 INJECTION, SOLUTION INTRAVENOUS at 08:28

## 2019-03-16 RX ADMIN — ENOXAPARIN SODIUM 30 MG: 30 INJECTION SUBCUTANEOUS at 15:41

## 2019-03-16 ASSESSMENT — PAIN SCALES - GENERAL
PAINLEVEL_OUTOF10: 0

## 2019-03-17 LAB
ALBUMIN SERPL-MCNC: 1.8 G/DL (ref 3.5–5.2)
ALP BLD-CCNC: 63 U/L (ref 40–130)
ALT SERPL-CCNC: <5 U/L (ref 5–41)
ANION GAP SERPL CALCULATED.3IONS-SCNC: 9 MMOL/L (ref 7–19)
AST SERPL-CCNC: 9 U/L (ref 5–40)
BASOPHILS ABSOLUTE: 0.1 K/UL (ref 0–0.2)
BASOPHILS RELATIVE PERCENT: 0.2 % (ref 0–1)
BILIRUB SERPL-MCNC: 0.3 MG/DL (ref 0.2–1.2)
BUN BLDV-MCNC: 31 MG/DL (ref 8–23)
CALCIUM SERPL-MCNC: 9.9 MG/DL (ref 8.8–10.2)
CHLORIDE BLD-SCNC: 112 MMOL/L (ref 98–111)
CO2: 18 MMOL/L (ref 22–29)
CREAT SERPL-MCNC: 2 MG/DL (ref 0.5–1.2)
EOSINOPHILS ABSOLUTE: 0.3 K/UL (ref 0–0.6)
EOSINOPHILS RELATIVE PERCENT: 1.4 % (ref 0–5)
GFR NON-AFRICAN AMERICAN: 33
GLUCOSE BLD-MCNC: 113 MG/DL (ref 74–109)
HCT VFR BLD CALC: 31 % (ref 42–52)
HEMOGLOBIN: 9.1 G/DL (ref 14–18)
LYMPHOCYTES ABSOLUTE: 1.5 K/UL (ref 1.1–4.5)
LYMPHOCYTES RELATIVE PERCENT: 7.5 % (ref 20–40)
MCH RBC QN AUTO: 25.5 PG (ref 27–31)
MCHC RBC AUTO-ENTMCNC: 29.4 G/DL (ref 33–37)
MCV RBC AUTO: 86.8 FL (ref 80–94)
MONOCYTES ABSOLUTE: 1.5 K/UL (ref 0–0.9)
MONOCYTES RELATIVE PERCENT: 7.4 % (ref 0–10)
NEUTROPHILS ABSOLUTE: 16.5 K/UL (ref 1.5–7.5)
NEUTROPHILS RELATIVE PERCENT: 82.6 % (ref 50–65)
PDW BLD-RTO: 17.8 % (ref 11.5–14.5)
PLATELET # BLD: 523 K/UL (ref 130–400)
PMV BLD AUTO: 10.7 FL (ref 9.4–12.4)
POTASSIUM SERPL-SCNC: 3.7 MMOL/L (ref 3.5–5)
RBC # BLD: 3.57 M/UL (ref 4.7–6.1)
SODIUM BLD-SCNC: 139 MMOL/L (ref 136–145)
TOTAL PROTEIN: 6.6 G/DL (ref 6.6–8.7)
WBC # BLD: 20 K/UL (ref 4.8–10.8)

## 2019-03-17 PROCEDURE — 2580000003 HC RX 258: Performed by: INTERNAL MEDICINE

## 2019-03-17 PROCEDURE — 6370000000 HC RX 637 (ALT 250 FOR IP): Performed by: INTERNAL MEDICINE

## 2019-03-17 PROCEDURE — 99233 SBSQ HOSP IP/OBS HIGH 50: CPT | Performed by: PHYSICIAN ASSISTANT

## 2019-03-17 PROCEDURE — 99232 SBSQ HOSP IP/OBS MODERATE 35: CPT | Performed by: INTERNAL MEDICINE

## 2019-03-17 PROCEDURE — 36415 COLL VENOUS BLD VENIPUNCTURE: CPT

## 2019-03-17 PROCEDURE — 97116 GAIT TRAINING THERAPY: CPT

## 2019-03-17 PROCEDURE — 6360000002 HC RX W HCPCS: Performed by: INTERNAL MEDICINE

## 2019-03-17 PROCEDURE — C9113 INJ PANTOPRAZOLE SODIUM, VIA: HCPCS | Performed by: INTERNAL MEDICINE

## 2019-03-17 PROCEDURE — 2140000000 HC CCU INTERMEDIATE R&B

## 2019-03-17 PROCEDURE — 97110 THERAPEUTIC EXERCISES: CPT

## 2019-03-17 PROCEDURE — 2500000003 HC RX 250 WO HCPCS: Performed by: INTERNAL MEDICINE

## 2019-03-17 PROCEDURE — 80053 COMPREHEN METABOLIC PANEL: CPT

## 2019-03-17 PROCEDURE — 85025 COMPLETE CBC W/AUTO DIFF WBC: CPT

## 2019-03-17 RX ORDER — VANCOMYCIN HYDROCHLORIDE 1 G/200ML
1000 INJECTION, SOLUTION INTRAVENOUS
Status: DISCONTINUED | OUTPATIENT
Start: 2019-03-17 | End: 2019-03-18 | Stop reason: HOSPADM

## 2019-03-17 RX ADMIN — AZTREONAM 1 G: 1 INJECTION, SOLUTION INTRAVENOUS at 23:34

## 2019-03-17 RX ADMIN — METRONIDAZOLE 500 MG: 500 INJECTION, SOLUTION INTRAVENOUS at 14:49

## 2019-03-17 RX ADMIN — AZTREONAM 1 G: 1 INJECTION, SOLUTION INTRAVENOUS at 00:08

## 2019-03-17 RX ADMIN — VANCOMYCIN HYDROCHLORIDE 1000 MG: 1 INJECTION, SOLUTION INTRAVENOUS at 18:36

## 2019-03-17 RX ADMIN — SODIUM CHLORIDE: 9 INJECTION, SOLUTION INTRAVENOUS at 23:34

## 2019-03-17 RX ADMIN — METRONIDAZOLE 500 MG: 500 INJECTION, SOLUTION INTRAVENOUS at 05:45

## 2019-03-17 RX ADMIN — AZTREONAM 1 G: 1 INJECTION, SOLUTION INTRAVENOUS at 15:59

## 2019-03-17 RX ADMIN — ASPIRIN 81 MG 81 MG: 81 TABLET ORAL at 08:08

## 2019-03-17 RX ADMIN — AMLODIPINE BESYLATE 10 MG: 10 TABLET ORAL at 08:08

## 2019-03-17 RX ADMIN — ACETAMINOPHEN 650 MG: 325 TABLET ORAL at 20:12

## 2019-03-17 RX ADMIN — METOPROLOL TARTRATE 12.5 MG: 25 TABLET ORAL at 08:08

## 2019-03-17 RX ADMIN — ACETAMINOPHEN 650 MG: 325 TABLET ORAL at 02:41

## 2019-03-17 RX ADMIN — PANTOPRAZOLE SODIUM 40 MG: 40 INJECTION, POWDER, FOR SOLUTION INTRAVENOUS at 08:08

## 2019-03-17 RX ADMIN — AZTREONAM 1 G: 1 INJECTION, SOLUTION INTRAVENOUS at 08:08

## 2019-03-17 RX ADMIN — ENOXAPARIN SODIUM 30 MG: 30 INJECTION SUBCUTANEOUS at 15:59

## 2019-03-17 RX ADMIN — SODIUM CHLORIDE: 9 INJECTION, SOLUTION INTRAVENOUS at 05:46

## 2019-03-17 RX ADMIN — Medication 10 ML: at 08:09

## 2019-03-17 RX ADMIN — METOPROLOL TARTRATE 12.5 MG: 25 TABLET ORAL at 20:03

## 2019-03-17 RX ADMIN — Medication 10 ML: at 20:05

## 2019-03-17 ASSESSMENT — PAIN SCALES - GENERAL
PAINLEVEL_OUTOF10: 0
PAINLEVEL_OUTOF10: 4
PAINLEVEL_OUTOF10: 4
PAINLEVEL_OUTOF10: 0

## 2019-03-18 ENCOUNTER — TELEPHONE (OUTPATIENT)
Dept: CARDIOLOGY | Age: 79
End: 2019-03-18

## 2019-03-18 VITALS
OXYGEN SATURATION: 95 % | WEIGHT: 150 LBS | TEMPERATURE: 97.4 F | BODY MASS INDEX: 25.61 KG/M2 | HEIGHT: 64 IN | SYSTOLIC BLOOD PRESSURE: 147 MMHG | HEART RATE: 113 BPM | RESPIRATION RATE: 115 BRPM | DIASTOLIC BLOOD PRESSURE: 83 MMHG

## 2019-03-18 PROBLEM — N18.30 STAGE 3 CHRONIC KIDNEY DISEASE (HCC): Status: ACTIVE | Noted: 2019-03-18

## 2019-03-18 PROBLEM — I47.20 V TACH: Status: RESOLVED | Noted: 2019-03-13 | Resolved: 2019-03-18

## 2019-03-18 PROBLEM — D64.9 NORMOCYTIC ANEMIA: Status: ACTIVE | Noted: 2019-03-18

## 2019-03-18 PROBLEM — J18.9 PNEUMONIA DUE TO INFECTIOUS ORGANISM: Status: ACTIVE | Noted: 2019-03-18

## 2019-03-18 PROBLEM — L03.113 CELLULITIS OF RIGHT UPPER LIMB: Status: ACTIVE | Noted: 2019-03-18

## 2019-03-18 PROBLEM — E43 SEVERE PROTEIN-CALORIE MALNUTRITION (HCC): Status: ACTIVE | Noted: 2019-03-18

## 2019-03-18 LAB
ALBUMIN SERPL-MCNC: 2 G/DL (ref 3.5–5.2)
ALP BLD-CCNC: 62 U/L (ref 40–130)
ALT SERPL-CCNC: 6 U/L (ref 5–41)
ANION GAP SERPL CALCULATED.3IONS-SCNC: 10 MMOL/L (ref 7–19)
AST SERPL-CCNC: 14 U/L (ref 5–40)
BASOPHILS ABSOLUTE: 0.1 K/UL (ref 0–0.2)
BASOPHILS RELATIVE PERCENT: 0.3 % (ref 0–1)
BILIRUB SERPL-MCNC: 0.3 MG/DL (ref 0.2–1.2)
BLOOD CULTURE, ROUTINE: NORMAL
BUN BLDV-MCNC: 30 MG/DL (ref 8–23)
CALCIUM SERPL-MCNC: 10 MG/DL (ref 8.8–10.2)
CHLORIDE BLD-SCNC: 112 MMOL/L (ref 98–111)
CO2: 19 MMOL/L (ref 22–29)
CREAT SERPL-MCNC: 1.9 MG/DL (ref 0.5–1.2)
CULTURE, BLOOD 2: NORMAL
EOSINOPHILS ABSOLUTE: 0.4 K/UL (ref 0–0.6)
EOSINOPHILS RELATIVE PERCENT: 1.8 % (ref 0–5)
GFR NON-AFRICAN AMERICAN: 35
GLUCOSE BLD-MCNC: 103 MG/DL (ref 74–109)
HCT VFR BLD CALC: 32.8 % (ref 42–52)
HEMOGLOBIN: 9.5 G/DL (ref 14–18)
LYMPHOCYTES ABSOLUTE: 1.7 K/UL (ref 1.1–4.5)
LYMPHOCYTES RELATIVE PERCENT: 8.9 % (ref 20–40)
MCH RBC QN AUTO: 25.1 PG (ref 27–31)
MCHC RBC AUTO-ENTMCNC: 29 G/DL (ref 33–37)
MCV RBC AUTO: 86.8 FL (ref 80–94)
MONOCYTES ABSOLUTE: 1.4 K/UL (ref 0–0.9)
MONOCYTES RELATIVE PERCENT: 6.9 % (ref 0–10)
NEUTROPHILS ABSOLUTE: 15.8 K/UL (ref 1.5–7.5)
NEUTROPHILS RELATIVE PERCENT: 80.9 % (ref 50–65)
PDW BLD-RTO: 17.9 % (ref 11.5–14.5)
PLATELET # BLD: 566 K/UL (ref 130–400)
PMV BLD AUTO: 10.5 FL (ref 9.4–12.4)
POTASSIUM SERPL-SCNC: 3.6 MMOL/L (ref 3.5–5)
RBC # BLD: 3.78 M/UL (ref 4.7–6.1)
SODIUM BLD-SCNC: 141 MMOL/L (ref 136–145)
TOTAL PROTEIN: 6.9 G/DL (ref 6.6–8.7)
WBC # BLD: 19.5 K/UL (ref 4.8–10.8)

## 2019-03-18 PROCEDURE — 85025 COMPLETE CBC W/AUTO DIFF WBC: CPT

## 2019-03-18 PROCEDURE — 6370000000 HC RX 637 (ALT 250 FOR IP): Performed by: INTERNAL MEDICINE

## 2019-03-18 PROCEDURE — 97530 THERAPEUTIC ACTIVITIES: CPT

## 2019-03-18 PROCEDURE — 99238 HOSP IP/OBS DSCHRG MGMT 30/<: CPT | Performed by: FAMILY MEDICINE

## 2019-03-18 PROCEDURE — 97165 OT EVAL LOW COMPLEX 30 MIN: CPT

## 2019-03-18 PROCEDURE — 36415 COLL VENOUS BLD VENIPUNCTURE: CPT

## 2019-03-18 PROCEDURE — 97116 GAIT TRAINING THERAPY: CPT

## 2019-03-18 PROCEDURE — 80053 COMPREHEN METABOLIC PANEL: CPT

## 2019-03-18 RX ORDER — AMLODIPINE BESYLATE 10 MG/1
10 TABLET ORAL DAILY
Qty: 30 TABLET | Refills: 0 | Status: SHIPPED | OUTPATIENT
Start: 2019-03-19 | End: 2019-05-06 | Stop reason: DRUGHIGH

## 2019-03-18 RX ORDER — LEVOFLOXACIN 250 MG/1
250 TABLET ORAL DAILY
Qty: 2 TABLET | Refills: 0 | Status: SHIPPED | OUTPATIENT
Start: 2019-03-18 | End: 2019-03-20

## 2019-03-18 RX ADMIN — METOPROLOL TARTRATE 12.5 MG: 25 TABLET ORAL at 09:11

## 2019-03-18 RX ADMIN — AMLODIPINE BESYLATE 10 MG: 10 TABLET ORAL at 09:11

## 2019-03-18 RX ADMIN — ASPIRIN 81 MG 81 MG: 81 TABLET ORAL at 09:11

## 2019-03-18 ASSESSMENT — PAIN SCALES - GENERAL
PAINLEVEL_OUTOF10: 0

## 2019-03-19 ENCOUNTER — TRANSCRIBE ORDERS (OUTPATIENT)
Dept: ADMINISTRATIVE | Facility: HOSPITAL | Age: 79
End: 2019-03-19

## 2019-03-19 DIAGNOSIS — R91.8 LUNG MASS: Primary | ICD-10-CM

## 2019-03-19 PROBLEM — C34.11 MALIGNANT NEOPLASM OF UPPER LOBE OF RIGHT LUNG: Status: ACTIVE | Noted: 2019-03-19

## 2019-03-19 LAB
LV EF: 50 %
LVEF MODALITY: NORMAL

## 2019-03-19 NOTE — PROGRESS NOTES
"Chief Complaint   Patient presents with   • Lung Cancer     Patient is here for follow up.        He returns today for interval follow-up for consideration of lobar resection.  Pulmonary point of point of view he is asymptomatic denying chest pain, hemoptysis, hoarseness of voice, or new pneumonia.  He has had unintended weight loss.  He does have some abdominal discomfort.    Visit Vitals  /70 (BP Location: Right arm, Patient Position: Sitting, Cuff Size: Adult)   Pulse 112   Ht 162.6 cm (64\")   Wt 71.3 kg (157 lb 3.2 oz)   SpO2 98%   BMI 26.98 kg/m²       Physical Exam:    General: NAD, In good spirits  Cardiovascular: RRR, No murmur, rubs, or gallops.    Pulmonary: CTAB, No wheezing, rubs, or rales.  Abdomen: soft, Non-distended, and non-tender  Extremities: warm, MOSES  Neurologic:  No focal deficits, CN II-XII intact grossly.      Study Result     EXAM: CT CHEST W CONTRAST- - 2/18/2019 1:59 PM CST     HISTORY: lung mass; R91.8-Other nonspecific abnormal finding of lung  field       COMPARISON: 9/12/2018.      DOSE LENGTH PRODUCT: 275 mGy cm. Automated exposure control was also  utilized to decrease patient radiation dose.     TECHNIQUE: Enhanced CT images of the chest obtained with multiplanar  reformats.     FINDINGS:      AIRWAYS/PULMONARY PARENCHYMA: Trachea with a 0.8 cm nodular opacity on  axial image 38. Right upper lobe with a 4.9 x 3.5 cm spiculated mass on  axial image 59, previously 6.1 x 3.6 cm.  Emphysema. There is mild  diffuse interstitial thickening, similar compared to prior.     CARDIAC:  Normal heart size.  No pericardial effusion.  Coronary artery  calcifications present.     VASCULATURE: Thoracic aorta is normal in course and caliber. No  significant aortic atherosclerosis. 3 vessel aortic arch. No pulmonary  artery filling defect on this non CTA exam. Normal pulmonary artery  caliber.     MEDIASTINUM: Similar subcarinal lymph node measuring 1.4 cm in short  axis. There are " periesophageal lymph nodes such as on axial 68 measuring  1.6 cm Esophagus has normal coarse, caliber and wall thickness.     EXTRATHORACIC: The visualized portion of the thoracic inlet within  normal limits. Normal appearance of the thyroid. No axillary adenopathy.  The extrathoracic soft tissues are within normal limits.      INCLUDED UPPER ABDOMEN: Similar pneumobilia. Gallbladder absent. There  is a mass in the subhepatic space adjacent to the right lateral aspect  of the descending duodenum measuring about 4.3 x 9.0 cm, which  previously measured 4.1 x 3.4 cm on 9/12/2018     Left kidney with partially visualized hydronephrosis and decreased  enhancement compared to the right kidney. There is left greater than  right perinephric stranding.     OSSEOUS: Degenerative changes in the visualized spine. No acute or  suspicious pulmonary finding.         IMPRESSION:  1. New left hydronephrosis with decreased enhancement of the left kidney  compared to the right, this is incompletely visualized and  characterized. Recommend CT abdomen and pelvis.  2. Increased size of subhepatic mass adjacent to the descending duodenum  which measures 4.3 x 9.0 cm, previously 4.1 x 3.4 cm on 10/11/2018  abdomen MRI. This is concerning for malignancy.  3. There is a 0.8 cm nodular opacity within the trachea, which could  represent adherent mucus or mass. Recommend direct visualization or  attention on follow-up.  4. Slightly decreased size of right upper lobe mass measuring 4.9 x 3.5  cm, previously 6.1 x 3.6 cm. Similar mediastinal and paraesophageal  lymphadenopathy.   5. Similar pneumobilia.     Result of new left hydronephrosis resulting new left hydronephrosis and  recommendation for CT abdomen and pelvis discussed with Dr. Frandy Patel at 5:02 PM on 2/18/2019. We also discussed his subhepatic mass,  lung mass and nodular in the trachea.  This report was finalized on 02/18/2019 17:02 by Dr Kristen Turpin MD.       Study Result      EXAMINATION: CT ABDOMEN PELVIS WO CONTRAST- 2/20/2019 3:38 PM CST     HISTORY: New onset hydronephrosis; N13.30-Unspecified hydronephrosis.  HISTORY of pancreatic mass, remote history of partial resection of  pancreatic head with negative pathology.     DOSE: 295 mGycm (Automatic exposure control technique was implemented in  an effort to keep the radiation dose as low as possible without  compromising image quality)     REPORT: Spiral CT of the abdomen and pelvis was performed without  contrast from the lung bases through the pubic symphysis. Reconstructed  coronal and sagittal images are also reviewed.     COMPARISON: Outside CT abdomen pelvis with contrast 9/12/2018. MRI  abdomen 10/11/2018.     Review of lung windows demonstrate hyperinflation of the lung bases with  mild interstitial thickening and fibrosis which appears stable. Heart  size is normal. There is a small amount of pneumonia ki as before,  which probably represents previous sphincterotomy. The gallbladder is  absent. The liver and spleen are homogeneous on this unenhanced study.  There is a soft tissue mass in the subhepatic space, which abuts the  distal stomach and proximal duodenum. The mass measures approximately  8.9 x 5.4 cm, on previous MRI, this measured 4.1 x 3.4 cm. There are  some coarse calcifications within the central aspect of this mass. The  remaining pancreas appears within normal limits. Adrenal glands are  unremarkable. There is atrophy of right kidney. Increased attenuation of  perinephric fat planes is noted bilaterally. There are multiple focal  small nephrolithiasis bilaterally. There is mild left-sided  hydronephrosis, with increased attenuation of urine within the  collecting structures, most likely due to delayed contrast excretion,  the patient was administered intravenous contrast for a chest CT 2 days  earlier. The left renal cortex retains some of the contrast as well,  which could represent ATN. Medial to the  left renal hilum there is a 3.5  cm lobular mass which may be the etiology of urinary tract obstruction.  The left ureter distal to this point is decompressed. The bladder  appears within normal limits. Bowel loops are normal in caliber,  moderate stool volume is present. The stomach is decompressed. There is  extensive calcified plaque within the aorta and iliac arteries. The  aorta is normal in caliber. There is fatty infiltration of the inguinal  canals. Review of bone windows shows advanced degenerative changes in  the lumbar spine, including grade 1 spondylolisthesis L5-S1 with a  vacuum disc.     IMPRESSION:  1. Mild left-sided hydronephrosis likely related to obstruction of the  proximal left ureter due to a 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 Retention of contrast within the  left renal cortex in patchy distribution may represent ATN. There is  increased attenuation of urine within the dilated left renal collecting  system probably due to delayed excretion of contrast.  2. Compared with the previous MRI from October, 2018 there is heart  increase in size of the large subhepatic mass, currently measuring 8.9 x  5.4 cm, compared with 4.1 x 3.4 cm previous. This is concerning for  neoplasm also.              This report was finalized on 02/20/2019 16:03 by Dr. Allen Churchill MD.         Impression   lung cancer  Increasing pancreatic mass  Left-sided hydronephrosis  Cardiomyopathy    Medical decision-making/recommendations/plan:  Complex medical decision-making involved.  I discussed with Mr. Prater and his friend as to the aforementioned findings.  In brief his lung cancer appears to have decreased in size slightly.  That being said I was contacted by Dr. Peterson following repeat imaging of the chest with the finding of hydronephrosis.  This is attributed to likely a proximal left ureter extrinsic compression of a lobular 3.5 cm mass medial to the left renal hilum.   This is 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  Additionally he comparing the previous study which is an MRI to the current obtain abdominal CAT scan the subhepatic mass now measures 8.9 x 5.4 centers as to previous measurements of 4.1 x 3.4 centers in size.  We certainly have a dilemma.  He has biopsy-proven lung cancer.  That being said he has had this mass previously biopsied and has been stable in size for multiple years.  My concern is in fact this is pancreatic cancer.  Both malignancies would have high risk for mortality.  He does have an appointment with Dr. Michele soon.  I have personally spoken with Dr. Michele to convey the aforementioned findings.  Since he does have obstructive issues a separate consultation has been made to Dr. Michele per patient request.  We will plan likely urinary stenting.  This for palliation.  I will have him return to see me back in 1 month to ascertain best course of plan from a lung cancer point of view.  At this time we will hold on any further plans for intervention.  I did encourage the patient to contact me should he have any symptoms concerning for progression and specifically concerning for hemoptysis.  He is a non-smoker.  Complex medical decision making with 3 separate medical problems in place.  All of which have increased risk of death.  Total time spent is 35 minutes with greater than 50% time counseling the patient but also discussed with both Dr. Michele and Dr. Sylvester.    Patient's Body mass index is 26.98 kg/m². BMI is within normal parameters. No follow-up required..

## 2019-03-20 LAB
EKG P AXIS: 27 DEGREES
EKG P AXIS: 56 DEGREES
EKG P-R INTERVAL: 146 MS
EKG P-R INTERVAL: 148 MS
EKG Q-T INTERVAL: 354 MS
EKG Q-T INTERVAL: 372 MS
EKG QRS DURATION: 74 MS
EKG QRS DURATION: 90 MS
EKG QTC CALCULATION (BAZETT): 389 MS
EKG QTC CALCULATION (BAZETT): 421 MS
EKG T AXIS: 36 DEGREES
EKG T AXIS: 58 DEGREES

## 2019-04-01 ENCOUNTER — TRANSCRIBE ORDERS (OUTPATIENT)
Dept: ADMINISTRATIVE | Facility: HOSPITAL | Age: 79
End: 2019-04-01

## 2019-04-01 DIAGNOSIS — C34.11 MALIGNANT NEOPLASM OF UPPER LOBE, RIGHT BRONCHUS OR LUNG (HCC): Primary | ICD-10-CM

## 2019-04-02 ENCOUNTER — HOSPITAL ENCOUNTER (OUTPATIENT)
Dept: CT IMAGING | Facility: HOSPITAL | Age: 79
Discharge: HOME | End: 2019-04-02

## 2019-04-02 ENCOUNTER — HOSPITAL ENCOUNTER (OUTPATIENT)
Dept: CT IMAGING | Facility: HOSPITAL | Age: 79
Discharge: HOME | End: 2019-04-02
Admitting: INTERNAL MEDICINE

## 2019-04-02 DIAGNOSIS — C34.11 MALIGNANT NEOPLASM OF UPPER LOBE, RIGHT BRONCHUS OR LUNG (HCC): ICD-10-CM

## 2019-04-02 PROCEDURE — 0 FLUDEOXYGLUCOSE F18 SOLUTION: Performed by: INTERNAL MEDICINE

## 2019-04-02 PROCEDURE — A9552 F18 FDG: HCPCS | Performed by: INTERNAL MEDICINE

## 2019-04-02 PROCEDURE — 78815 PET IMAGE W/CT SKULL-THIGH: CPT

## 2019-04-02 RX ADMIN — FLUDEOXYGLUCOSE F18 1 DOSE: 300 INJECTION INTRAVENOUS at 09:23

## 2019-04-10 ENCOUNTER — TRANSCRIBE ORDERS (OUTPATIENT)
Dept: ADMINISTRATIVE | Facility: HOSPITAL | Age: 79
End: 2019-04-10

## 2019-04-10 DIAGNOSIS — N18.4 CHRONIC KIDNEY DISEASE, STAGE IV (SEVERE) (HCC): ICD-10-CM

## 2019-04-10 DIAGNOSIS — N13.30 HYDRONEPHROSIS, UNSPECIFIED HYDRONEPHROSIS TYPE: Primary | ICD-10-CM

## 2019-04-16 ENCOUNTER — HOSPITAL ENCOUNTER (OUTPATIENT)
Dept: ULTRASOUND IMAGING | Facility: HOSPITAL | Age: 79
Discharge: HOME | End: 2019-04-16
Admitting: INTERNAL MEDICINE

## 2019-04-16 PROCEDURE — 76775 US EXAM ABDO BACK WALL LIM: CPT

## 2019-04-25 ENCOUNTER — TRANSCRIBE ORDERS (OUTPATIENT)
Dept: ADMINISTRATIVE | Facility: HOSPITAL | Age: 79
End: 2019-04-25

## 2019-04-25 ENCOUNTER — LAB REQUISITION (OUTPATIENT)
Dept: LAB | Facility: HOSPITAL | Age: 79
End: 2019-04-25

## 2019-04-25 DIAGNOSIS — Z00.00 ENCOUNTER FOR GENERAL ADULT MEDICAL EXAMINATION WITHOUT ABNORMAL FINDINGS: ICD-10-CM

## 2019-04-25 DIAGNOSIS — D64.9 SYMPTOMATIC ANEMIA: Primary | ICD-10-CM

## 2019-04-25 DIAGNOSIS — D47.2 MGUS (MONOCLONAL GAMMOPATHY OF UNKNOWN SIGNIFICANCE): ICD-10-CM

## 2019-04-25 DIAGNOSIS — C34.90 RECURRENT NON-SMALL CELL LUNG CANCER (NSCLC) (HCC): ICD-10-CM

## 2019-04-25 PROCEDURE — 86850 RBC ANTIBODY SCREEN: CPT | Performed by: INTERNAL MEDICINE

## 2019-04-25 PROCEDURE — 86901 BLOOD TYPING SEROLOGIC RH(D): CPT | Performed by: INTERNAL MEDICINE

## 2019-04-25 PROCEDURE — 86900 BLOOD TYPING SEROLOGIC ABO: CPT | Performed by: INTERNAL MEDICINE

## 2019-04-25 PROCEDURE — 86923 COMPATIBILITY TEST ELECTRIC: CPT

## 2019-04-25 RX ORDER — DIPHENHYDRAMINE HYDROCHLORIDE 50 MG/ML
25 INJECTION INTRAMUSCULAR; INTRAVENOUS ONCE
Status: CANCELLED
Start: 2019-04-26 | End: 2019-04-26

## 2019-04-26 ENCOUNTER — INFUSION (OUTPATIENT)
Dept: ONCOLOGY | Facility: HOSPITAL | Age: 79
End: 2019-04-26

## 2019-04-26 ENCOUNTER — APPOINTMENT (OUTPATIENT)
Dept: CT IMAGING | Facility: HOSPITAL | Age: 79
End: 2019-04-26

## 2019-04-26 ENCOUNTER — LAB (OUTPATIENT)
Dept: LAB | Facility: HOSPITAL | Age: 79
End: 2019-04-26

## 2019-04-26 ENCOUNTER — HOSPITAL ENCOUNTER (INPATIENT)
Facility: HOSPITAL | Age: 79
LOS: 2 days | Discharge: HOME | End: 2019-04-30
Attending: EMERGENCY MEDICINE | Admitting: FAMILY MEDICINE

## 2019-04-26 VITALS
HEART RATE: 83 BPM | HEIGHT: 64 IN | TEMPERATURE: 97.4 F | WEIGHT: 140 LBS | RESPIRATION RATE: 18 BRPM | BODY MASS INDEX: 23.9 KG/M2 | DIASTOLIC BLOOD PRESSURE: 68 MMHG | OXYGEN SATURATION: 99 % | SYSTOLIC BLOOD PRESSURE: 117 MMHG

## 2019-04-26 DIAGNOSIS — Z74.09 IMPAIRED FUNCTIONAL MOBILITY, BALANCE, GAIT, AND ENDURANCE: ICD-10-CM

## 2019-04-26 DIAGNOSIS — Z78.9 IMPAIRED MOBILITY AND ADLS: ICD-10-CM

## 2019-04-26 DIAGNOSIS — C34.90 RECURRENT NON-SMALL CELL LUNG CANCER (NSCLC) (HCC): ICD-10-CM

## 2019-04-26 DIAGNOSIS — D64.9 SYMPTOMATIC ANEMIA: ICD-10-CM

## 2019-04-26 DIAGNOSIS — C34.11 MALIGNANT NEOPLASM OF UPPER LOBE OF RIGHT LUNG (HCC): Primary | ICD-10-CM

## 2019-04-26 DIAGNOSIS — K92.1 MELENA: Primary | ICD-10-CM

## 2019-04-26 DIAGNOSIS — Z74.09 IMPAIRED MOBILITY AND ADLS: ICD-10-CM

## 2019-04-26 DIAGNOSIS — D47.2 MGUS (MONOCLONAL GAMMOPATHY OF UNKNOWN SIGNIFICANCE): ICD-10-CM

## 2019-04-26 LAB
ABO GROUP BLD: NORMAL
ALBUMIN SERPL-MCNC: 3 G/DL (ref 3.5–5)
ALBUMIN/GLOB SERPL: 0.8 G/DL (ref 1.1–2.5)
ALP SERPL-CCNC: 91 U/L (ref 24–120)
ALT SERPL W P-5'-P-CCNC: 29 U/L (ref 0–54)
ANION GAP SERPL CALCULATED.3IONS-SCNC: 11 MMOL/L (ref 4–13)
AST SERPL-CCNC: 39 U/L (ref 7–45)
BILIRUB SERPL-MCNC: 0.9 MG/DL (ref 0.1–1)
BLD GP AB SCN SERPL QL: NEGATIVE
BUN BLD-MCNC: 62 MG/DL (ref 5–21)
BUN/CREAT SERPL: 34.4 (ref 7–25)
CALCIUM SPEC-SCNC: 10.2 MG/DL (ref 8.4–10.4)
CHLORIDE SERPL-SCNC: 105 MMOL/L (ref 98–110)
CO2 SERPL-SCNC: 17 MMOL/L (ref 24–31)
CREAT BLD-MCNC: 1.8 MG/DL (ref 0.5–1.4)
DEPRECATED RDW RBC AUTO: 45.1 FL (ref 40–54)
DEPRECATED RDW RBC AUTO: 46.3 FL (ref 40–54)
DEVELOPER EXPIRATION DATE: ABNORMAL
DEVELOPER LOT NUMBER: 147
ERYTHROCYTE [DISTWIDTH] IN BLOOD BY AUTOMATED COUNT: 15.3 % (ref 12–15)
ERYTHROCYTE [DISTWIDTH] IN BLOOD BY AUTOMATED COUNT: 15.3 % (ref 12–15)
EXPIRATION DATE: ABNORMAL
FECAL OCCULT BLOOD SCREEN, POC: POSITIVE
GFR SERPL CREATININE-BSD FRML MDRD: 37 ML/MIN/1.73
GLOBULIN UR ELPH-MCNC: 3.9 GM/DL
GLUCOSE BLD-MCNC: 186 MG/DL (ref 70–100)
HCT VFR BLD AUTO: 28.3 % (ref 40–52)
HCT VFR BLD AUTO: 28.5 % (ref 40–52)
HCT VFR BLD AUTO: 30.3 % (ref 40–52)
HGB BLD-MCNC: 10 G/DL (ref 14–18)
HGB BLD-MCNC: 9 G/DL (ref 14–18)
HGB BLD-MCNC: 9.7 G/DL (ref 14–18)
HOLD SPECIMEN: NORMAL
HOLD SPECIMEN: NORMAL
INR PPP: 0.99 (ref 0.91–1.09)
LYMPHOCYTES # BLD MANUAL: 0.24 10*3/MM3 (ref 0.72–4.86)
LYMPHOCYTES NFR BLD MANUAL: 12 % (ref 15–45)
Lab: 147
MCH RBC QN AUTO: 26.5 PG (ref 28–32)
MCH RBC QN AUTO: 26.9 PG (ref 28–32)
MCHC RBC AUTO-ENTMCNC: 31.8 G/DL (ref 33–36)
MCHC RBC AUTO-ENTMCNC: 33 G/DL (ref 33–36)
MCV RBC AUTO: 81.5 FL (ref 82–95)
MCV RBC AUTO: 83.5 FL (ref 82–95)
NEGATIVE CONTROL: NEGATIVE
NEUTROPHILS # BLD AUTO: 1.8 10*3/MM3 (ref 1.87–8.4)
NEUTROPHILS NFR BLD MANUAL: 88 % (ref 39–78)
NEUTS HYPERSEG # BLD: ABNORMAL 10*3/UL
NRBC BLD AUTO-RTO: 0 /100 WBC (ref 0–0.2)
PLATELET # BLD AUTO: 249 10*3/MM3 (ref 130–400)
PLATELET # BLD AUTO: 254 10*3/MM3 (ref 130–400)
PMV BLD AUTO: 10.2 FL (ref 6–12)
PMV BLD AUTO: 10.2 FL (ref 6–12)
POSITIVE CONTROL: POSITIVE
POTASSIUM BLD-SCNC: 4.8 MMOL/L (ref 3.5–5.3)
PROT SERPL-MCNC: 6.9 G/DL (ref 6.3–8.7)
PROTHROMBIN TIME: 13.4 SECONDS (ref 11.9–14.6)
RBC # BLD AUTO: 3.39 10*6/MM3 (ref 4.8–5.9)
RBC # BLD AUTO: 3.72 10*6/MM3 (ref 4.8–5.9)
RBC MORPH BLD: NORMAL
RH BLD: POSITIVE
SMALL PLATELETS BLD QL SMEAR: ADEQUATE
SODIUM BLD-SCNC: 133 MMOL/L (ref 135–145)
T&S EXPIRATION DATE: NORMAL
WBC NRBC COR # BLD: 1.91 10*3/MM3 (ref 4.8–10.8)
WBC NRBC COR # BLD: 2.04 10*3/MM3 (ref 4.8–10.8)
WHOLE BLOOD HOLD SPECIMEN: NORMAL
WHOLE BLOOD HOLD SPECIMEN: NORMAL

## 2019-04-26 PROCEDURE — G0378 HOSPITAL OBSERVATION PER HR: HCPCS

## 2019-04-26 PROCEDURE — 85014 HEMATOCRIT: CPT | Performed by: INTERNAL MEDICINE

## 2019-04-26 PROCEDURE — 25010000002 FUROSEMIDE PER 20 MG: Performed by: INTERNAL MEDICINE

## 2019-04-26 PROCEDURE — 74177 CT ABD & PELVIS W/CONTRAST: CPT

## 2019-04-26 PROCEDURE — 86900 BLOOD TYPING SEROLOGIC ABO: CPT

## 2019-04-26 PROCEDURE — 86850 RBC ANTIBODY SCREEN: CPT | Performed by: EMERGENCY MEDICINE

## 2019-04-26 PROCEDURE — 99284 EMERGENCY DEPT VISIT MOD MDM: CPT

## 2019-04-26 PROCEDURE — 96375 TX/PRO/DX INJ NEW DRUG ADDON: CPT

## 2019-04-26 PROCEDURE — 80053 COMPREHEN METABOLIC PANEL: CPT | Performed by: EMERGENCY MEDICINE

## 2019-04-26 PROCEDURE — P9016 RBC LEUKOCYTES REDUCED: HCPCS

## 2019-04-26 PROCEDURE — 86901 BLOOD TYPING SEROLOGIC RH(D): CPT | Performed by: EMERGENCY MEDICINE

## 2019-04-26 PROCEDURE — 96374 THER/PROPH/DIAG INJ IV PUSH: CPT

## 2019-04-26 PROCEDURE — 85027 COMPLETE CBC AUTOMATED: CPT

## 2019-04-26 PROCEDURE — 85018 HEMOGLOBIN: CPT | Performed by: INTERNAL MEDICINE

## 2019-04-26 PROCEDURE — 25010000002 DIPHENHYDRAMINE PER 50 MG: Performed by: INTERNAL MEDICINE

## 2019-04-26 PROCEDURE — 82270 OCCULT BLOOD FECES: CPT | Performed by: EMERGENCY MEDICINE

## 2019-04-26 PROCEDURE — 86923 COMPATIBILITY TEST ELECTRIC: CPT

## 2019-04-26 PROCEDURE — 85007 BL SMEAR W/DIFF WBC COUNT: CPT | Performed by: EMERGENCY MEDICINE

## 2019-04-26 PROCEDURE — 25010000002 IOPAMIDOL 61 % SOLUTION: Performed by: EMERGENCY MEDICINE

## 2019-04-26 PROCEDURE — 86900 BLOOD TYPING SEROLOGIC ABO: CPT | Performed by: EMERGENCY MEDICINE

## 2019-04-26 PROCEDURE — 94799 UNLISTED PULMONARY SVC/PX: CPT

## 2019-04-26 PROCEDURE — 36430 TRANSFUSION BLD/BLD COMPNT: CPT

## 2019-04-26 PROCEDURE — 85025 COMPLETE CBC W/AUTO DIFF WBC: CPT | Performed by: EMERGENCY MEDICINE

## 2019-04-26 PROCEDURE — 94760 N-INVAS EAR/PLS OXIMETRY 1: CPT

## 2019-04-26 PROCEDURE — 25010000002 METHYLPREDNISOLONE PER 125 MG: Performed by: INTERNAL MEDICINE

## 2019-04-26 PROCEDURE — 85610 PROTHROMBIN TIME: CPT | Performed by: EMERGENCY MEDICINE

## 2019-04-26 PROCEDURE — 84484 ASSAY OF TROPONIN QUANT: CPT | Performed by: INTERNAL MEDICINE

## 2019-04-26 RX ORDER — ACETAMINOPHEN 325 MG/1
650 TABLET ORAL ONCE
Status: COMPLETED | OUTPATIENT
Start: 2019-04-26 | End: 2019-04-26

## 2019-04-26 RX ORDER — PANTOPRAZOLE SODIUM 40 MG/10ML
40 INJECTION, POWDER, LYOPHILIZED, FOR SOLUTION INTRAVENOUS ONCE
Status: COMPLETED | OUTPATIENT
Start: 2019-04-26 | End: 2019-04-26

## 2019-04-26 RX ORDER — SUCRALFATE ORAL 1 G/10ML
1 SUSPENSION ORAL
Status: DISCONTINUED | OUTPATIENT
Start: 2019-04-26 | End: 2019-04-30 | Stop reason: HOSPADM

## 2019-04-26 RX ORDER — SODIUM CHLORIDE 0.9 % (FLUSH) 0.9 %
3-10 SYRINGE (ML) INJECTION AS NEEDED
Status: DISCONTINUED | OUTPATIENT
Start: 2019-04-26 | End: 2019-04-30 | Stop reason: HOSPADM

## 2019-04-26 RX ORDER — FUROSEMIDE 10 MG/ML
20 INJECTION INTRAMUSCULAR; INTRAVENOUS ONCE
Status: COMPLETED | OUTPATIENT
Start: 2019-04-26 | End: 2019-04-26

## 2019-04-26 RX ORDER — SODIUM CHLORIDE 0.9 % (FLUSH) 0.9 %
10 SYRINGE (ML) INJECTION AS NEEDED
Status: CANCELLED | OUTPATIENT
Start: 2019-04-26

## 2019-04-26 RX ORDER — ONDANSETRON 2 MG/ML
4 INJECTION INTRAMUSCULAR; INTRAVENOUS EVERY 6 HOURS PRN
Status: DISCONTINUED | OUTPATIENT
Start: 2019-04-26 | End: 2019-04-30 | Stop reason: HOSPADM

## 2019-04-26 RX ORDER — FAMOTIDINE 10 MG/ML
20 INJECTION, SOLUTION INTRAVENOUS EVERY 12 HOURS SCHEDULED
Status: DISCONTINUED | OUTPATIENT
Start: 2019-04-26 | End: 2019-04-27

## 2019-04-26 RX ORDER — ROPINIROLE 0.25 MG/1
0.5 TABLET, FILM COATED ORAL NIGHTLY
Status: DISCONTINUED | OUTPATIENT
Start: 2019-04-26 | End: 2019-04-30 | Stop reason: HOSPADM

## 2019-04-26 RX ORDER — SODIUM CHLORIDE 9 MG/ML
250 INJECTION, SOLUTION INTRAVENOUS AS NEEDED
Status: DISCONTINUED | OUTPATIENT
Start: 2019-04-26 | End: 2019-04-26 | Stop reason: HOSPADM

## 2019-04-26 RX ORDER — AMLODIPINE BESYLATE 5 MG/1
5 TABLET ORAL
Status: DISCONTINUED | OUTPATIENT
Start: 2019-04-27 | End: 2019-04-30 | Stop reason: HOSPADM

## 2019-04-26 RX ORDER — AMITRIPTYLINE HYDROCHLORIDE 25 MG/1
25 TABLET, FILM COATED ORAL NIGHTLY
Status: DISCONTINUED | OUTPATIENT
Start: 2019-04-26 | End: 2019-04-30 | Stop reason: HOSPADM

## 2019-04-26 RX ORDER — DIPHENHYDRAMINE HYDROCHLORIDE 50 MG/ML
25 INJECTION INTRAMUSCULAR; INTRAVENOUS ONCE
Status: COMPLETED | OUTPATIENT
Start: 2019-04-26 | End: 2019-04-26

## 2019-04-26 RX ORDER — SODIUM CHLORIDE 0.9 % (FLUSH) 0.9 %
10 SYRINGE (ML) INJECTION AS NEEDED
Status: DISCONTINUED | OUTPATIENT
Start: 2019-04-26 | End: 2019-04-26 | Stop reason: HOSPADM

## 2019-04-26 RX ORDER — SODIUM CHLORIDE 0.9 % (FLUSH) 0.9 %
3 SYRINGE (ML) INJECTION EVERY 12 HOURS SCHEDULED
Status: DISCONTINUED | OUTPATIENT
Start: 2019-04-26 | End: 2019-04-30 | Stop reason: HOSPADM

## 2019-04-26 RX ORDER — LANOLIN ALCOHOL/MO/W.PET/CERES
6 CREAM (GRAM) TOPICAL NIGHTLY PRN
Status: DISCONTINUED | OUTPATIENT
Start: 2019-04-26 | End: 2019-04-30 | Stop reason: HOSPADM

## 2019-04-26 RX ORDER — METHYLPREDNISOLONE SODIUM SUCCINATE 125 MG/2ML
125 INJECTION, POWDER, LYOPHILIZED, FOR SOLUTION INTRAMUSCULAR; INTRAVENOUS ONCE
Status: DISCONTINUED | OUTPATIENT
Start: 2019-04-26 | End: 2019-04-26 | Stop reason: HOSPADM

## 2019-04-26 RX ORDER — SODIUM CHLORIDE 0.9 % (FLUSH) 0.9 %
10 SYRINGE (ML) INJECTION AS NEEDED
Status: DISCONTINUED | OUTPATIENT
Start: 2019-04-26 | End: 2019-04-30 | Stop reason: HOSPADM

## 2019-04-26 RX ORDER — ACETAMINOPHEN 325 MG/1
650 TABLET ORAL EVERY 4 HOURS PRN
Status: DISCONTINUED | OUTPATIENT
Start: 2019-04-26 | End: 2019-04-30 | Stop reason: HOSPADM

## 2019-04-26 RX ADMIN — ACETAMINOPHEN 650 MG: 325 TABLET, FILM COATED ORAL at 07:49

## 2019-04-26 RX ADMIN — PANTOPRAZOLE SODIUM 40 MG: 40 INJECTION, POWDER, FOR SOLUTION INTRAVENOUS at 21:52

## 2019-04-26 RX ADMIN — METHYLPREDNISOLONE SODIUM SUCCINATE 125 MG: 125 INJECTION, POWDER, FOR SOLUTION INTRAMUSCULAR; INTRAVENOUS at 08:07

## 2019-04-26 RX ADMIN — Medication 500 UNITS: at 14:22

## 2019-04-26 RX ADMIN — SODIUM CHLORIDE, PRESERVATIVE FREE 10 ML: 5 INJECTION INTRAVENOUS at 14:21

## 2019-04-26 RX ADMIN — DIPHENHYDRAMINE HYDROCHLORIDE 25 MG: 50 INJECTION INTRAMUSCULAR; INTRAVENOUS at 07:50

## 2019-04-26 RX ADMIN — FUROSEMIDE 20 MG: 10 INJECTION, SOLUTION INTRAVENOUS at 10:27

## 2019-04-26 RX ADMIN — IOPAMIDOL 100 ML: 612 INJECTION, SOLUTION INTRAVENOUS at 20:03

## 2019-04-27 PROBLEM — E87.1 HYPONATREMIA: Status: ACTIVE | Noted: 2019-04-27

## 2019-04-27 PROBLEM — K31.89 MASS OF DUODENUM: Status: ACTIVE | Noted: 2019-04-27

## 2019-04-27 PROBLEM — N18.30 STAGE 3 CHRONIC KIDNEY DISEASE (HCC): Chronic | Status: ACTIVE | Noted: 2019-04-27

## 2019-04-27 PROBLEM — D62 ACUTE BLOOD LOSS ANEMIA: Status: ACTIVE | Noted: 2019-04-27

## 2019-04-27 PROBLEM — N28.89 LEFT RENAL MASS: Status: ACTIVE | Noted: 2019-04-27

## 2019-04-27 PROBLEM — C34.90 ADENOCARCINOMA, LUNG (HCC): Chronic | Status: ACTIVE | Noted: 2019-04-27

## 2019-04-27 LAB
ABO + RH BLD: NORMAL
ABO + RH BLD: NORMAL
ALBUMIN SERPL-MCNC: 2.6 G/DL (ref 3.5–5)
ALBUMIN/GLOB SERPL: 0.8 G/DL (ref 1.1–2.5)
ALP SERPL-CCNC: 75 U/L (ref 24–120)
ALT SERPL W P-5'-P-CCNC: 35 U/L (ref 0–54)
ANION GAP SERPL CALCULATED.3IONS-SCNC: 8 MMOL/L (ref 4–13)
AST SERPL-CCNC: 35 U/L (ref 7–45)
BH BB BLOOD EXPIRATION DATE: NORMAL
BH BB BLOOD EXPIRATION DATE: NORMAL
BH BB BLOOD TYPE BARCODE: 5100
BH BB BLOOD TYPE BARCODE: 5100
BH BB DISPENSE STATUS: NORMAL
BH BB DISPENSE STATUS: NORMAL
BH BB PRODUCT CODE: NORMAL
BH BB PRODUCT CODE: NORMAL
BH BB UNIT NUMBER: NORMAL
BH BB UNIT NUMBER: NORMAL
BILIRUB SERPL-MCNC: 0.5 MG/DL (ref 0.1–1)
BUN BLD-MCNC: 58 MG/DL (ref 5–21)
BUN/CREAT SERPL: 32 (ref 7–25)
CALCIUM SPEC-SCNC: 9.5 MG/DL (ref 8.4–10.4)
CHLORIDE SERPL-SCNC: 108 MMOL/L (ref 98–110)
CO2 SERPL-SCNC: 18 MMOL/L (ref 24–31)
CREAT BLD-MCNC: 1.81 MG/DL (ref 0.5–1.4)
DEPRECATED RDW RBC AUTO: 46.4 FL (ref 40–54)
EOSINOPHIL # BLD MANUAL: 0.02 10*3/MM3 (ref 0–0.7)
EOSINOPHIL NFR BLD MANUAL: 1 % (ref 0–4)
ERYTHROCYTE [DISTWIDTH] IN BLOOD BY AUTOMATED COUNT: 15.4 % (ref 12–15)
GFR SERPL CREATININE-BSD FRML MDRD: 37 ML/MIN/1.73
GLOBULIN UR ELPH-MCNC: 3.4 GM/DL
GLUCOSE BLD-MCNC: 122 MG/DL (ref 70–100)
HCT VFR BLD AUTO: 26.6 % (ref 40–52)
HCT VFR BLD AUTO: 27.3 % (ref 40–52)
HCT VFR BLD AUTO: 28.1 % (ref 40–52)
HGB BLD-MCNC: 8.7 G/DL (ref 14–18)
HGB BLD-MCNC: 9 G/DL (ref 14–18)
HGB BLD-MCNC: 9.1 G/DL (ref 14–18)
LYMPHOCYTES # BLD MANUAL: 0.42 10*3/MM3 (ref 0.72–4.86)
LYMPHOCYTES NFR BLD MANUAL: 18.4 % (ref 15–45)
MCH RBC QN AUTO: 26.9 PG (ref 28–32)
MCHC RBC AUTO-ENTMCNC: 32.7 G/DL (ref 33–36)
MCV RBC AUTO: 82.4 FL (ref 82–95)
MICROCYTES BLD QL: ABNORMAL
NEUTROPHILS # BLD AUTO: 1.85 10*3/MM3 (ref 1.87–8.4)
NEUTROPHILS NFR BLD MANUAL: 79.6 % (ref 39–78)
NEUTS BAND NFR BLD MANUAL: 1 % (ref 0–10)
PLAT MORPH BLD: NORMAL
PLATELET # BLD AUTO: 214 10*3/MM3 (ref 130–400)
PMV BLD AUTO: 10.4 FL (ref 6–12)
POTASSIUM BLD-SCNC: 4.4 MMOL/L (ref 3.5–5.3)
PROT SERPL-MCNC: 6 G/DL (ref 6.3–8.7)
RBC # BLD AUTO: 3.23 10*6/MM3 (ref 4.8–5.9)
SODIUM BLD-SCNC: 134 MMOL/L (ref 135–145)
TROPONIN I SERPL-MCNC: <0.012 NG/ML (ref 0–0.03)
TROPONIN I SERPL-MCNC: <0.012 NG/ML (ref 0–0.03)
TSH SERPL DL<=0.05 MIU/L-ACNC: 0.52 MIU/ML (ref 0.47–4.68)
UNIT  ABO: NORMAL
UNIT  ABO: NORMAL
UNIT  RH: NORMAL
UNIT  RH: NORMAL
WBC MORPH BLD: NORMAL
WBC NRBC COR # BLD: 2.3 10*3/MM3 (ref 4.8–10.8)

## 2019-04-27 PROCEDURE — 84443 ASSAY THYROID STIM HORMONE: CPT | Performed by: INTERNAL MEDICINE

## 2019-04-27 PROCEDURE — 84484 ASSAY OF TROPONIN QUANT: CPT | Performed by: INTERNAL MEDICINE

## 2019-04-27 PROCEDURE — 99222 1ST HOSP IP/OBS MODERATE 55: CPT | Performed by: INTERNAL MEDICINE

## 2019-04-27 PROCEDURE — 80053 COMPREHEN METABOLIC PANEL: CPT | Performed by: INTERNAL MEDICINE

## 2019-04-27 PROCEDURE — 94799 UNLISTED PULMONARY SVC/PX: CPT

## 2019-04-27 PROCEDURE — 94760 N-INVAS EAR/PLS OXIMETRY 1: CPT

## 2019-04-27 PROCEDURE — 85007 BL SMEAR W/DIFF WBC COUNT: CPT | Performed by: INTERNAL MEDICINE

## 2019-04-27 PROCEDURE — G0378 HOSPITAL OBSERVATION PER HR: HCPCS

## 2019-04-27 PROCEDURE — 85018 HEMOGLOBIN: CPT | Performed by: INTERNAL MEDICINE

## 2019-04-27 PROCEDURE — 85014 HEMATOCRIT: CPT | Performed by: INTERNAL MEDICINE

## 2019-04-27 PROCEDURE — 85025 COMPLETE CBC W/AUTO DIFF WBC: CPT | Performed by: INTERNAL MEDICINE

## 2019-04-27 RX ORDER — ASPIRIN 81 MG/1
81 TABLET ORAL DAILY
COMMUNITY
End: 2019-04-30 | Stop reason: HOSPADM

## 2019-04-27 RX ORDER — TAMSULOSIN HYDROCHLORIDE 0.4 MG/1
1 CAPSULE ORAL NIGHTLY
COMMUNITY

## 2019-04-27 RX ORDER — ZOLPIDEM TARTRATE 5 MG/1
5 TABLET ORAL NIGHTLY PRN
Status: ON HOLD | COMMUNITY
End: 2020-07-23

## 2019-04-27 RX ORDER — SODIUM CHLORIDE 9 MG/ML
50 INJECTION, SOLUTION INTRAVENOUS CONTINUOUS
Status: DISCONTINUED | OUTPATIENT
Start: 2019-04-27 | End: 2019-04-28

## 2019-04-27 RX ORDER — FOLIC ACID 1 MG/1
1 TABLET ORAL DAILY
COMMUNITY
End: 2021-08-25

## 2019-04-27 RX ORDER — MEGESTROL ACETATE 40 MG/ML
800 SUSPENSION ORAL DAILY
Status: ON HOLD | COMMUNITY
End: 2020-07-23

## 2019-04-27 RX ADMIN — ROPINIROLE 0.5 MG: 0.25 TABLET, FILM COATED ORAL at 00:00

## 2019-04-27 RX ADMIN — SODIUM CHLORIDE 75 ML/HR: 9 INJECTION, SOLUTION INTRAVENOUS at 10:25

## 2019-04-27 RX ADMIN — SODIUM CHLORIDE, PRESERVATIVE FREE 3 ML: 5 INJECTION INTRAVENOUS at 08:44

## 2019-04-27 RX ADMIN — AMITRIPTYLINE HYDROCHLORIDE 25 MG: 25 TABLET, FILM COATED ORAL at 00:00

## 2019-04-27 RX ADMIN — SUCRALFATE 1 G: 1 SUSPENSION ORAL at 00:00

## 2019-04-27 RX ADMIN — SODIUM CHLORIDE 8 MG/HR: 900 INJECTION INTRAVENOUS at 10:25

## 2019-04-27 RX ADMIN — ROPINIROLE 0.5 MG: 0.25 TABLET, FILM COATED ORAL at 21:26

## 2019-04-27 RX ADMIN — SODIUM CHLORIDE 8 MG/HR: 900 INJECTION INTRAVENOUS at 19:53

## 2019-04-27 RX ADMIN — FAMOTIDINE 20 MG: 10 INJECTION, SOLUTION INTRAVENOUS at 08:45

## 2019-04-27 RX ADMIN — Medication 6 MG: at 00:00

## 2019-04-27 RX ADMIN — SUCRALFATE 1 G: 1 SUSPENSION ORAL at 17:26

## 2019-04-27 RX ADMIN — SODIUM CHLORIDE 8 MG/HR: 900 INJECTION INTRAVENOUS at 14:24

## 2019-04-27 RX ADMIN — SUCRALFATE 1 G: 1 SUSPENSION ORAL at 21:26

## 2019-04-27 RX ADMIN — AMITRIPTYLINE HYDROCHLORIDE 25 MG: 25 TABLET, FILM COATED ORAL at 21:27

## 2019-04-27 RX ADMIN — SODIUM CHLORIDE, PRESERVATIVE FREE 3 ML: 5 INJECTION INTRAVENOUS at 00:00

## 2019-04-27 RX ADMIN — FAMOTIDINE 20 MG: 10 INJECTION, SOLUTION INTRAVENOUS at 00:00

## 2019-04-28 PROBLEM — T45.1X5A PANCYTOPENIA DUE TO ANTINEOPLASTIC CHEMOTHERAPY: Status: ACTIVE | Noted: 2019-04-28

## 2019-04-28 PROBLEM — D61.810 PANCYTOPENIA DUE TO ANTINEOPLASTIC CHEMOTHERAPY: Status: ACTIVE | Noted: 2019-04-28

## 2019-04-28 LAB
ANION GAP SERPL CALCULATED.3IONS-SCNC: 8 MMOL/L (ref 4–13)
BUN BLD-MCNC: 46 MG/DL (ref 5–21)
BUN/CREAT SERPL: 27.4 (ref 7–25)
CALCIUM SPEC-SCNC: 8.8 MG/DL (ref 8.4–10.4)
CHLORIDE SERPL-SCNC: 115 MMOL/L (ref 98–110)
CO2 SERPL-SCNC: 17 MMOL/L (ref 24–31)
CREAT BLD-MCNC: 1.68 MG/DL (ref 0.5–1.4)
DEPRECATED RDW RBC AUTO: 47.5 FL (ref 40–54)
ERYTHROCYTE [DISTWIDTH] IN BLOOD BY AUTOMATED COUNT: 15.6 % (ref 12–15)
GFR SERPL CREATININE-BSD FRML MDRD: 40 ML/MIN/1.73
GLUCOSE BLD-MCNC: 80 MG/DL (ref 70–100)
HCT VFR BLD AUTO: 25.8 % (ref 40–52)
HCT VFR BLD AUTO: 31.3 % (ref 40–52)
HGB BLD-MCNC: 8.1 G/DL (ref 14–18)
HGB BLD-MCNC: 9.9 G/DL (ref 14–18)
MCH RBC QN AUTO: 26.3 PG (ref 28–32)
MCHC RBC AUTO-ENTMCNC: 31.4 G/DL (ref 33–36)
MCV RBC AUTO: 83.8 FL (ref 82–95)
PLATELET # BLD AUTO: 125 10*3/MM3 (ref 130–400)
PMV BLD AUTO: 10.1 FL (ref 6–12)
POTASSIUM BLD-SCNC: 3.9 MMOL/L (ref 3.5–5.3)
RBC # BLD AUTO: 3.08 10*6/MM3 (ref 4.8–5.9)
SODIUM BLD-SCNC: 140 MMOL/L (ref 135–145)
WBC NRBC COR # BLD: 1.31 10*3/MM3 (ref 4.8–10.8)

## 2019-04-28 PROCEDURE — 85018 HEMOGLOBIN: CPT | Performed by: INTERNAL MEDICINE

## 2019-04-28 PROCEDURE — 94799 UNLISTED PULMONARY SVC/PX: CPT

## 2019-04-28 PROCEDURE — 94760 N-INVAS EAR/PLS OXIMETRY 1: CPT

## 2019-04-28 PROCEDURE — 85027 COMPLETE CBC AUTOMATED: CPT | Performed by: INTERNAL MEDICINE

## 2019-04-28 PROCEDURE — 97166 OT EVAL MOD COMPLEX 45 MIN: CPT

## 2019-04-28 PROCEDURE — 80048 BASIC METABOLIC PNL TOTAL CA: CPT | Performed by: INTERNAL MEDICINE

## 2019-04-28 PROCEDURE — 85014 HEMATOCRIT: CPT | Performed by: INTERNAL MEDICINE

## 2019-04-28 PROCEDURE — 99232 SBSQ HOSP IP/OBS MODERATE 35: CPT | Performed by: INTERNAL MEDICINE

## 2019-04-28 RX ORDER — SODIUM BICARBONATE 650 MG/1
650 TABLET ORAL 2 TIMES DAILY
Status: DISCONTINUED | OUTPATIENT
Start: 2019-04-28 | End: 2019-04-30 | Stop reason: HOSPADM

## 2019-04-28 RX ADMIN — SUCRALFATE 1 G: 1 SUSPENSION ORAL at 05:16

## 2019-04-28 RX ADMIN — SODIUM CHLORIDE 8 MG/HR: 900 INJECTION INTRAVENOUS at 04:46

## 2019-04-28 RX ADMIN — SUCRALFATE 1 G: 1 SUSPENSION ORAL at 17:44

## 2019-04-28 RX ADMIN — SODIUM CHLORIDE 8 MG/HR: 900 INJECTION INTRAVENOUS at 19:12

## 2019-04-28 RX ADMIN — SODIUM CHLORIDE 75 ML/HR: 9 INJECTION, SOLUTION INTRAVENOUS at 00:13

## 2019-04-28 RX ADMIN — SODIUM CHLORIDE 8 MG/HR: 900 INJECTION INTRAVENOUS at 00:13

## 2019-04-28 RX ADMIN — Medication 6 MG: at 22:53

## 2019-04-28 RX ADMIN — SODIUM BICARBONATE 650 MG: 650 TABLET ORAL at 20:47

## 2019-04-28 RX ADMIN — SUCRALFATE 1 G: 1 SUSPENSION ORAL at 11:20

## 2019-04-28 RX ADMIN — AMITRIPTYLINE HYDROCHLORIDE 25 MG: 25 TABLET, FILM COATED ORAL at 20:47

## 2019-04-28 RX ADMIN — SODIUM CHLORIDE 8 MG/HR: 900 INJECTION INTRAVENOUS at 09:42

## 2019-04-28 RX ADMIN — SODIUM CHLORIDE 75 ML/HR: 9 INJECTION, SOLUTION INTRAVENOUS at 12:31

## 2019-04-28 RX ADMIN — ROPINIROLE 0.5 MG: 0.25 TABLET, FILM COATED ORAL at 20:47

## 2019-04-28 RX ADMIN — SODIUM CHLORIDE 8 MG/HR: 900 INJECTION INTRAVENOUS at 14:22

## 2019-04-28 RX ADMIN — SUCRALFATE 1 G: 1 SUSPENSION ORAL at 20:47

## 2019-04-28 RX ADMIN — AMLODIPINE BESYLATE 5 MG: 5 TABLET ORAL at 08:46

## 2019-04-28 RX ADMIN — Medication 6 MG: at 02:44

## 2019-04-29 ENCOUNTER — TELEPHONE (OUTPATIENT)
Dept: CARDIOLOGY | Age: 79
End: 2019-04-29

## 2019-04-29 ENCOUNTER — ANESTHESIA (OUTPATIENT)
Dept: PERIOP | Facility: HOSPITAL | Age: 79
End: 2019-04-29

## 2019-04-29 ENCOUNTER — ANESTHESIA EVENT (OUTPATIENT)
Dept: PERIOP | Facility: HOSPITAL | Age: 79
End: 2019-04-29

## 2019-04-29 LAB
ANION GAP SERPL CALCULATED.3IONS-SCNC: 7 MMOL/L (ref 4–13)
BUN BLD-MCNC: 33 MG/DL (ref 5–21)
BUN/CREAT SERPL: 19.8 (ref 7–25)
CALCIUM SPEC-SCNC: 8.5 MG/DL (ref 8.4–10.4)
CHLORIDE SERPL-SCNC: 112 MMOL/L (ref 98–110)
CO2 SERPL-SCNC: 19 MMOL/L (ref 24–31)
CREAT BLD-MCNC: 1.67 MG/DL (ref 0.5–1.4)
DEPRECATED RDW RBC AUTO: 48.5 FL (ref 40–54)
EOSINOPHIL # BLD MANUAL: 0.06 10*3/MM3 (ref 0–0.7)
EOSINOPHIL NFR BLD MANUAL: 8.3 % (ref 0–4)
ERYTHROCYTE [DISTWIDTH] IN BLOOD BY AUTOMATED COUNT: 15.5 % (ref 12–15)
GFR SERPL CREATININE-BSD FRML MDRD: 40 ML/MIN/1.73
GLUCOSE BLD-MCNC: 77 MG/DL (ref 70–100)
HCT VFR BLD AUTO: 25.3 % (ref 40–52)
HGB BLD-MCNC: 7.9 G/DL (ref 14–18)
LYMPHOCYTES # BLD MANUAL: 0.34 10*3/MM3 (ref 0.72–4.86)
LYMPHOCYTES NFR BLD MANUAL: 1 % (ref 4–12)
LYMPHOCYTES NFR BLD MANUAL: 45.8 % (ref 15–45)
MCH RBC QN AUTO: 26.6 PG (ref 28–32)
MCHC RBC AUTO-ENTMCNC: 31.2 G/DL (ref 33–36)
MCV RBC AUTO: 85.2 FL (ref 82–95)
MONOCYTES # BLD AUTO: 0.01 10*3/MM3 (ref 0.19–1.3)
NEUTROPHILS # BLD AUTO: 0.34 10*3/MM3 (ref 1.87–8.4)
NEUTROPHILS NFR BLD MANUAL: 44.8 % (ref 39–78)
PLATELET # BLD AUTO: 87 10*3/MM3 (ref 130–400)
PMV BLD AUTO: 10.4 FL (ref 6–12)
POIKILOCYTOSIS BLD QL SMEAR: ABNORMAL
POTASSIUM BLD-SCNC: 3.6 MMOL/L (ref 3.5–5.3)
RBC # BLD AUTO: 2.97 10*6/MM3 (ref 4.8–5.9)
SMALL PLATELETS BLD QL SMEAR: ABNORMAL
SODIUM BLD-SCNC: 138 MMOL/L (ref 135–145)
WBC MORPH BLD: NORMAL
WBC NRBC COR # BLD: 0.75 10*3/MM3 (ref 4.8–10.8)

## 2019-04-29 PROCEDURE — 25010000002 DIPHENHYDRAMINE PER 50 MG: Performed by: PHYSICIAN ASSISTANT

## 2019-04-29 PROCEDURE — 25010000002 FUROSEMIDE PER 20 MG: Performed by: PHYSICIAN ASSISTANT

## 2019-04-29 PROCEDURE — 86900 BLOOD TYPING SEROLOGIC ABO: CPT

## 2019-04-29 PROCEDURE — 36430 TRANSFUSION BLD/BLD COMPNT: CPT

## 2019-04-29 PROCEDURE — 25010000002 FILGRASTIM PER 480 MCG: Performed by: INTERNAL MEDICINE

## 2019-04-29 PROCEDURE — 88305 TISSUE EXAM BY PATHOLOGIST: CPT | Performed by: INTERNAL MEDICINE

## 2019-04-29 PROCEDURE — 86923 COMPATIBILITY TEST ELECTRIC: CPT

## 2019-04-29 PROCEDURE — 25010000002 PROPOFOL 10 MG/ML EMULSION: Performed by: NURSE ANESTHETIST, CERTIFIED REGISTERED

## 2019-04-29 PROCEDURE — 85025 COMPLETE CBC W/AUTO DIFF WBC: CPT | Performed by: NURSE PRACTITIONER

## 2019-04-29 PROCEDURE — 43239 EGD BIOPSY SINGLE/MULTIPLE: CPT | Performed by: INTERNAL MEDICINE

## 2019-04-29 PROCEDURE — P9016 RBC LEUKOCYTES REDUCED: HCPCS

## 2019-04-29 PROCEDURE — 80048 BASIC METABOLIC PNL TOTAL CA: CPT | Performed by: INTERNAL MEDICINE

## 2019-04-29 PROCEDURE — 88342 IMHCHEM/IMCYTCHM 1ST ANTB: CPT | Performed by: INTERNAL MEDICINE

## 2019-04-29 PROCEDURE — 25010000002 HYDROCORTISONE SODIUM SUCCINATE 100 MG RECONSTITUTED SOLUTION: Performed by: PHYSICIAN ASSISTANT

## 2019-04-29 PROCEDURE — 85007 BL SMEAR W/DIFF WBC COUNT: CPT | Performed by: NURSE PRACTITIONER

## 2019-04-29 PROCEDURE — 0DD68ZX EXTRACTION OF STOMACH, VIA NATURAL OR ARTIFICIAL OPENING ENDOSCOPIC, DIAGNOSTIC: ICD-10-PCS | Performed by: INTERNAL MEDICINE

## 2019-04-29 RX ORDER — PROPOFOL 10 MG/ML
VIAL (ML) INTRAVENOUS AS NEEDED
Status: DISCONTINUED | OUTPATIENT
Start: 2019-04-29 | End: 2019-04-29 | Stop reason: SURG

## 2019-04-29 RX ORDER — METOPROLOL TARTRATE 5 MG/5ML
2 INJECTION INTRAVENOUS ONCE AS NEEDED
Status: DISCONTINUED | OUTPATIENT
Start: 2019-04-29 | End: 2019-04-29 | Stop reason: HOSPADM

## 2019-04-29 RX ORDER — SODIUM CHLORIDE 0.9 % (FLUSH) 0.9 %
3-10 SYRINGE (ML) INJECTION AS NEEDED
Status: DISCONTINUED | OUTPATIENT
Start: 2019-04-29 | End: 2019-04-29 | Stop reason: HOSPADM

## 2019-04-29 RX ORDER — SODIUM CHLORIDE 9 MG/ML
100 INJECTION, SOLUTION INTRAVENOUS CONTINUOUS
Status: DISCONTINUED | OUTPATIENT
Start: 2019-04-29 | End: 2019-04-30

## 2019-04-29 RX ORDER — FUROSEMIDE 10 MG/ML
20 INJECTION INTRAMUSCULAR; INTRAVENOUS ONCE
Status: COMPLETED | OUTPATIENT
Start: 2019-04-29 | End: 2019-04-29

## 2019-04-29 RX ORDER — SODIUM CHLORIDE, SODIUM LACTATE, POTASSIUM CHLORIDE, CALCIUM CHLORIDE 600; 310; 30; 20 MG/100ML; MG/100ML; MG/100ML; MG/100ML
INJECTION, SOLUTION INTRAVENOUS CONTINUOUS PRN
Status: DISCONTINUED | OUTPATIENT
Start: 2019-04-29 | End: 2019-04-29 | Stop reason: SURG

## 2019-04-29 RX ORDER — SODIUM CHLORIDE 0.9 % (FLUSH) 0.9 %
3 SYRINGE (ML) INJECTION EVERY 12 HOURS SCHEDULED
Status: DISCONTINUED | OUTPATIENT
Start: 2019-04-29 | End: 2019-04-29 | Stop reason: HOSPADM

## 2019-04-29 RX ORDER — LIDOCAINE HYDROCHLORIDE 20 MG/ML
INJECTION, SOLUTION INFILTRATION; PERINEURAL AS NEEDED
Status: DISCONTINUED | OUTPATIENT
Start: 2019-04-29 | End: 2019-04-29 | Stop reason: SURG

## 2019-04-29 RX ORDER — DIPHENHYDRAMINE HYDROCHLORIDE 50 MG/ML
25 INJECTION INTRAMUSCULAR; INTRAVENOUS ONCE
Status: COMPLETED | OUTPATIENT
Start: 2019-04-29 | End: 2019-04-29

## 2019-04-29 RX ADMIN — HYDROCORTISONE SODIUM SUCCINATE 100 MG: 100 INJECTION, POWDER, FOR SOLUTION INTRAMUSCULAR; INTRAVENOUS at 09:59

## 2019-04-29 RX ADMIN — SODIUM CHLORIDE 8 MG/HR: 900 INJECTION INTRAVENOUS at 11:14

## 2019-04-29 RX ADMIN — PROPOFOL 40 MG: 10 INJECTION, EMULSION INTRAVENOUS at 14:14

## 2019-04-29 RX ADMIN — FUROSEMIDE 20 MG: 10 INJECTION, SOLUTION INTRAMUSCULAR; INTRAVENOUS at 15:22

## 2019-04-29 RX ADMIN — SODIUM BICARBONATE 650 MG: 650 TABLET ORAL at 20:38

## 2019-04-29 RX ADMIN — AMITRIPTYLINE HYDROCHLORIDE 25 MG: 25 TABLET, FILM COATED ORAL at 20:38

## 2019-04-29 RX ADMIN — SODIUM CHLORIDE 8 MG/HR: 900 INJECTION INTRAVENOUS at 16:09

## 2019-04-29 RX ADMIN — SODIUM CHLORIDE 8 MG/HR: 900 INJECTION INTRAVENOUS at 20:38

## 2019-04-29 RX ADMIN — SODIUM CHLORIDE 8 MG/HR: 900 INJECTION INTRAVENOUS at 06:22

## 2019-04-29 RX ADMIN — FILGRASTIM 480 MCG: 480 INJECTION, SOLUTION INTRAVENOUS; SUBCUTANEOUS at 16:41

## 2019-04-29 RX ADMIN — DIPHENHYDRAMINE HYDROCHLORIDE 25 MG: 50 INJECTION, SOLUTION INTRAMUSCULAR; INTRAVENOUS at 09:59

## 2019-04-29 RX ADMIN — SODIUM CHLORIDE 8 MG/HR: 900 INJECTION INTRAVENOUS at 01:34

## 2019-04-29 RX ADMIN — SODIUM BICARBONATE 650 MG: 650 TABLET ORAL at 09:00

## 2019-04-29 RX ADMIN — PROPOFOL 40 MG: 10 INJECTION, EMULSION INTRAVENOUS at 14:21

## 2019-04-29 RX ADMIN — LIDOCAINE HYDROCHLORIDE 120 MG: 20 INJECTION, SOLUTION INFILTRATION; PERINEURAL at 14:14

## 2019-04-29 RX ADMIN — SODIUM CHLORIDE, POTASSIUM CHLORIDE, SODIUM LACTATE AND CALCIUM CHLORIDE: 600; 310; 30; 20 INJECTION, SOLUTION INTRAVENOUS at 13:58

## 2019-04-29 RX ADMIN — SUCRALFATE 1 G: 1 SUSPENSION ORAL at 20:38

## 2019-04-29 RX ADMIN — ROPINIROLE 0.5 MG: 0.25 TABLET, FILM COATED ORAL at 20:38

## 2019-04-29 RX ADMIN — AMLODIPINE BESYLATE 5 MG: 5 TABLET ORAL at 09:00

## 2019-04-29 NOTE — ANESTHESIA POSTPROCEDURE EVALUATION
"Patient: Karoline Prater    Procedure Summary     Date:  04/29/19 Room / Location:   [facility] /  [facility]    Anesthesia Start:  1402 Anesthesia Stop:  1435    Procedure:  ESOPHAGOGASTRODUODENOSCOPY WITH ANESTHESIA (N/A ) Diagnosis:       Melena      (Melena [K92.1])    Surgeon:  Lilliam Jj MD Provider:  Alexis Shepard CRNA    Anesthesia Type:  MAC ASA Status:  3          Anesthesia Type: MAC  Last vitals  BP   93/49 (04/29/19 1430)   Temp   97.5 °F (36.4 °C) (04/29/19 1430)   Pulse   78 (04/29/19 1430)   Resp   20 (04/29/19 1430)     SpO2   100 % (04/29/19 1430)     Post Anesthesia Care and Evaluation    Patient location during evaluation: PACU  Patient participation: complete - patient participated  Level of consciousness: awake and alert  Pain management: adequate  Airway patency: patent  Anesthetic complications: No anesthetic complications    Cardiovascular status: acceptable  Respiratory status: acceptable  Hydration status: acceptable    Comments: Blood pressure 93/49, pulse 78, temperature 97.5 °F (36.4 °C), temperature source Temporal, resp. rate 20, height 162.6 cm (64\"), weight 61.9 kg (136 lb 6.4 oz), SpO2 100 %.    Pt discharged from PACU based on cyn score >8      "

## 2019-04-29 NOTE — ANESTHESIA PREPROCEDURE EVALUATION
Anesthesia Evaluation     Patient summary reviewed and Nursing notes reviewed   no history of anesthetic complications:  NPO Solid Status: > 8 hours  NPO Liquid Status: > 8 hours           Airway   Mallampati: I  TM distance: >3 FB  Neck ROM: full  No difficulty expected  Dental      Pulmonary    (+) lung cancer, shortness of breath,   Cardiovascular   Exercise tolerance: poor (<4 METS)    (+) hypertension,     ROS comment: Vtach during EGD to evaluated pancreatic mass approximately 1 month prior. Pt had f/u heart cath that showed distal circumflex disease, not thought to be etiology of vtach arrest.     Per notes from Dr Robbins from 10/2018, pt has h/o non sustained vtach and is stable on beta blocker    Neuro/Psych  GI/Hepatic/Renal/Endo    (+)  GERD,      ROS Comment: Melena, anemia, egd to eval    Musculoskeletal     Abdominal    Substance History      OB/GYN          Other   (+) arthritis   history of cancer (Lung cancer, pancreatic tumor, multiple myeloma) active                  Anesthesia Plan    ASA 3     MAC   (Pt with recent h/o vtach arrest after induction of sedation for EGD. F/u heart cath showed distal circumflex disease no thought to be contributing to this arrest. Per records from cardiologist he has h/o NSVT and is controlled on beta blocker. Metoprolol has been held during admission, will pre tx with beta blocker, proceed with defib pads on patient.)  intravenous induction   Anesthetic plan, all risks, benefits, and alternatives have been provided, discussed and informed consent has been obtained with: patient.

## 2019-04-30 VITALS
DIASTOLIC BLOOD PRESSURE: 77 MMHG | HEIGHT: 64 IN | RESPIRATION RATE: 16 BRPM | OXYGEN SATURATION: 99 % | BODY MASS INDEX: 23.29 KG/M2 | TEMPERATURE: 98 F | HEART RATE: 115 BPM | SYSTOLIC BLOOD PRESSURE: 107 MMHG | WEIGHT: 136.4 LBS

## 2019-04-30 LAB
ABO + RH BLD: NORMAL
BH BB BLOOD EXPIRATION DATE: NORMAL
BH BB BLOOD TYPE BARCODE: 5100
BH BB DISPENSE STATUS: NORMAL
BH BB PRODUCT CODE: NORMAL
BH BB UNIT NUMBER: NORMAL
CYTO UR: NORMAL
DEPRECATED RDW RBC AUTO: 45.3 FL (ref 40–54)
EOSINOPHIL # BLD MANUAL: 0.05 10*3/MM3 (ref 0–0.7)
EOSINOPHIL NFR BLD MANUAL: 4 % (ref 0–4)
ERYTHROCYTE [DISTWIDTH] IN BLOOD BY AUTOMATED COUNT: 15 % (ref 12–15)
HCT VFR BLD AUTO: 31.7 % (ref 40–52)
HGB BLD-MCNC: 10.5 G/DL (ref 14–18)
LAB AP CASE REPORT: NORMAL
LYMPHOCYTES # BLD MANUAL: 0.68 10*3/MM3 (ref 0.72–4.86)
LYMPHOCYTES NFR BLD MANUAL: 2 % (ref 4–12)
LYMPHOCYTES NFR BLD MANUAL: 56 % (ref 15–45)
MCH RBC QN AUTO: 27.6 PG (ref 28–32)
MCHC RBC AUTO-ENTMCNC: 33.1 G/DL (ref 33–36)
MCV RBC AUTO: 83.2 FL (ref 82–95)
MONOCYTES # BLD AUTO: 0.02 10*3/MM3 (ref 0.19–1.3)
NEUTROPHILS # BLD AUTO: 0.46 10*3/MM3 (ref 1.87–8.4)
NEUTROPHILS NFR BLD MANUAL: 34 % (ref 39–78)
NEUTS BAND NFR BLD MANUAL: 4 % (ref 0–10)
NRBC BLD AUTO-RTO: 0 /100 WBC (ref 0–0.2)
PATH REPORT.FINAL DX SPEC: NORMAL
PATH REPORT.GROSS SPEC: NORMAL
PLATELET # BLD AUTO: 39 10*3/MM3 (ref 130–400)
PMV BLD AUTO: 9.7 FL (ref 6–12)
RBC # BLD AUTO: 3.81 10*6/MM3 (ref 4.8–5.9)
RBC MORPH BLD: NORMAL
SMALL PLATELETS BLD QL SMEAR: ABNORMAL
UNIT  ABO: NORMAL
UNIT  RH: NORMAL
WBC MORPH BLD: NORMAL
WBC NRBC COR # BLD: 1.21 10*3/MM3 (ref 4.8–10.8)

## 2019-04-30 PROCEDURE — 85025 COMPLETE CBC W/AUTO DIFF WBC: CPT | Performed by: PHYSICIAN ASSISTANT

## 2019-04-30 PROCEDURE — 82941 ASSAY OF GASTRIN: CPT | Performed by: INTERNAL MEDICINE

## 2019-04-30 PROCEDURE — 99232 SBSQ HOSP IP/OBS MODERATE 35: CPT | Performed by: INTERNAL MEDICINE

## 2019-04-30 PROCEDURE — 85007 BL SMEAR W/DIFF WBC COUNT: CPT | Performed by: PHYSICIAN ASSISTANT

## 2019-04-30 PROCEDURE — 97161 PT EVAL LOW COMPLEX 20 MIN: CPT | Performed by: PHYSICAL THERAPIST

## 2019-04-30 RX ORDER — SUCRALFATE ORAL 1 G/10ML
2 SUSPENSION ORAL 2 TIMES DAILY
Qty: 120 EACH | Refills: 1 | Status: ON HOLD | OUTPATIENT
Start: 2019-04-30 | End: 2020-07-23

## 2019-04-30 RX ORDER — SODIUM BICARBONATE 650 MG/1
650 TABLET ORAL 2 TIMES DAILY
Qty: 60 TABLET | Refills: 0 | Status: SHIPPED | OUTPATIENT
Start: 2019-04-30 | End: 2021-08-27 | Stop reason: HOSPADM

## 2019-04-30 RX ORDER — AMLODIPINE BESYLATE 5 MG/1
5 TABLET ORAL
Qty: 30 TABLET | Refills: 2 | Status: SHIPPED | OUTPATIENT
Start: 2019-05-01 | End: 2019-06-01 | Stop reason: HOSPADM

## 2019-04-30 RX ORDER — PANTOPRAZOLE SODIUM 40 MG/1
40 TABLET, DELAYED RELEASE ORAL DAILY
Qty: 30 TABLET | Refills: 1 | Status: SHIPPED | OUTPATIENT
Start: 2019-04-30

## 2019-04-30 RX ADMIN — SUCRALFATE 1 G: 1 SUSPENSION ORAL at 11:11

## 2019-04-30 RX ADMIN — SUCRALFATE 1 G: 1 SUSPENSION ORAL at 06:05

## 2019-04-30 RX ADMIN — SODIUM CHLORIDE 8 MG/HR: 900 INJECTION INTRAVENOUS at 11:16

## 2019-04-30 RX ADMIN — SODIUM CHLORIDE 8 MG/HR: 900 INJECTION INTRAVENOUS at 01:55

## 2019-04-30 RX ADMIN — SODIUM CHLORIDE 8 MG/HR: 900 INJECTION INTRAVENOUS at 06:58

## 2019-04-30 RX ADMIN — SODIUM BICARBONATE 650 MG: 650 TABLET ORAL at 08:45

## 2019-04-30 RX ADMIN — AMLODIPINE BESYLATE 5 MG: 5 TABLET ORAL at 08:45

## 2019-04-30 RX ADMIN — Medication 6 MG: at 00:26

## 2019-05-01 ENCOUNTER — READMISSION MANAGEMENT (OUTPATIENT)
Dept: CALL CENTER | Facility: HOSPITAL | Age: 79
End: 2019-05-01

## 2019-05-01 NOTE — OUTREACH NOTE
Prep Survey      Responses   Facility patient discharged from?  [facility]   Is patient eligible?  Yes   Discharge diagnosis  Mass of upper lobe of right lung   Does the patient have one of the following disease processes/diagnoses(primary or secondary)?  Other   Does the patient have Home health ordered?  No   Is there a DME ordered?  No   Prep survey completed?  Yes          Roz Shearer RN

## 2019-05-01 NOTE — THERAPY DISCHARGE NOTE
Acute Care - Physical Therapy Discharge Summary  [facility]       Patient Name: Karoline Prater  : 1940  MRN: [id number]    Today's Date: 2019  Onset of Illness/Injury or Date of Surgery: 19    Date of Referral to PT: 19  Referring Physician: Dr. Peñaloza      Admit Date: 2019      PT Recommendation and Plan    Visit Dx:    ICD-10-CM ICD-9-CM   1. Melena K92.1 578.1   2. Impaired mobility and ADLs Z74.09 799.89   3. Impaired functional mobility, balance, gait, and endurance Z74.09 V49.89       Outcome Measures     Row Name 19 0857             How much help from another person do you currently need...    Turning from your back to your side while in flat bed without using bedrails?  4  -MS      Moving from lying on back to sitting on the side of a flat bed without bedrails?  4  -MS      Moving to and from a bed to a chair (including a wheelchair)?  3  -MS      Standing up from a chair using your arms (e.g., wheelchair, bedside chair)?  3  -MS      Climbing 3-5 steps with a railing?  3  -MS      To walk in hospital room?  3  -MS      AM-PAC 6 Clicks Score  20  -MS         Functional Assessment    Outcome Measure Options  AM-PAC 6 Clicks Basic Mobility (PT)  -MS        User Key  (r) = Recorded By, (t) = Taken By, (c) = Cosigned By    Initials Name Provider Type    Kristen Buchanan, PT, DPT, NCS Physical Therapist              Rehab Goal Summary     Row Name 19 1157             Physical Therapy Goals    Bed Mobility Goal Selection (PT)  bed mobility, PT goal 1  -AH      Transfer Goal Selection (PT)  transfer, PT goal 1  -AH      Gait Training Goal Selection (PT)  gait training, PT goal 1  -AH      Balance Goal Selection (PT)  balance, PT goal 1  -AH         Bed Mobility Goal 1 (PT)    Activity/Assistive Device (Bed Mobility Goal 1, PT)  bed mobility activities, all  -AH      [place] Level/Cues Needed (Bed Mobility Goal 1, PT)  independent  -AH      Time Frame (Bed  Mobility Goal 1, PT)  long term goal (LTG);by discharge  -      Progress/Outcomes (Bed Mobility Goal 1, PT)  goal not met  -AH         Transfer Goal 1 (PT)    Activity/Assistive Device (Transfer Goal 1, PT)  sit-to-stand/stand-to-sit;bed-to-chair/chair-to-bed  -      [place] Level/Cues Needed (Transfer Goal 1, PT)  independent  -AH      Time Frame (Transfer Goal 1, PT)  long term goal (LTG);by discharge  -AH      Progress/Outcome (Transfer Goal 1, PT)  goal not met  -AH         Gait Training Goal 1 (PT)    Activity/Assistive Device (Gait Training Goal 1, PT)  gait (walking locomotion);backward stepping;decrease fall risk;diminish gait deviation;forward stepping;improve balance and speed;sidestepping  -      [place] Level (Gait Training Goal 1, PT)  independent  -AH      Distance (Gait Goal 1, PT)  300ft without a LOB  -AH      Time Frame (Gait Training Goal 1, PT)  long term goal (LTG);by discharge  -      Progress/Outcome (Gait Training Goal 1, PT)  goal not met  -AH         Balance Goal 1 (PT)    Activity/Assistive Device (Balance Goal 1, PT)  standing, dynamic reaching in all directions  -      [place] Level/Cues Needed (Balance Goal 1, PT)  independent  -AH      Time Frame (Balance Goal 1, PT)  long term goal (LTG);by discharge  -      Progress/Outcomes (Balance Goal 1, PT)  goal not met  -        User Key  (r) = Recorded By, (t) = Taken By, (c) = Cosigned By    Initials Name Provider Type [facility] Lupe Barnett PTA Physical Therapy Assistant PT              PT Discharge Summary  Reason for Discharge: Discharge from facility  Outcomes Achieved: Refer to plan of care for updates on goals achieved  Discharge Destination: Home      Lupe Barnett PTA   5/1/2019

## 2019-05-02 ENCOUNTER — LAB (OUTPATIENT)
Dept: LAB | Facility: HOSPITAL | Age: 79
End: 2019-05-02

## 2019-05-02 ENCOUNTER — INFUSION (OUTPATIENT)
Dept: ONCOLOGY | Facility: HOSPITAL | Age: 79
End: 2019-05-02

## 2019-05-02 ENCOUNTER — TRANSCRIBE ORDERS (OUTPATIENT)
Dept: ADMINISTRATIVE | Facility: HOSPITAL | Age: 79
End: 2019-05-02

## 2019-05-02 VITALS
HEIGHT: 64 IN | BODY MASS INDEX: 23.9 KG/M2 | WEIGHT: 140 LBS | RESPIRATION RATE: 20 BRPM | OXYGEN SATURATION: 100 % | TEMPERATURE: 97.3 F | HEART RATE: 90 BPM | SYSTOLIC BLOOD PRESSURE: 115 MMHG | DIASTOLIC BLOOD PRESSURE: 62 MMHG

## 2019-05-02 DIAGNOSIS — D69.6 THROMBOCYTOPENIA, UNSPECIFIED (HCC): ICD-10-CM

## 2019-05-02 DIAGNOSIS — C34.11 MALIGNANT NEOPLASM OF UPPER LOBE OF RIGHT LUNG (HCC): ICD-10-CM

## 2019-05-02 DIAGNOSIS — D69.6 THROMBOCYTOPENIA, UNSPECIFIED (HCC): Primary | ICD-10-CM

## 2019-05-02 DIAGNOSIS — D50.8 OTHER IRON DEFICIENCY ANEMIA: Primary | ICD-10-CM

## 2019-05-02 LAB
DEPRECATED RDW RBC AUTO: 45.1 FL (ref 40–54)
ERYTHROCYTE [DISTWIDTH] IN BLOOD BY AUTOMATED COUNT: 14.9 % (ref 12–15)
GASTRIN SERPL-MCNC: 40 PG/ML (ref 0–115)
HCT VFR BLD AUTO: 29.3 % (ref 40–52)
HGB BLD-MCNC: 9.6 G/DL (ref 14–18)
MCH RBC QN AUTO: 27 PG (ref 28–32)
MCHC RBC AUTO-ENTMCNC: 32.8 G/DL (ref 33–36)
MCV RBC AUTO: 82.5 FL (ref 82–95)
PLATELET # BLD AUTO: 53 10*3/MM3 (ref 130–400)
PMV BLD AUTO: 9.9 FL (ref 6–12)
RBC # BLD AUTO: 3.55 10*6/MM3 (ref 4.8–5.9)
WBC NRBC COR # BLD: 1.15 10*3/MM3 (ref 4.8–10.8)

## 2019-05-02 PROCEDURE — 96375 TX/PRO/DX INJ NEW DRUG ADDON: CPT

## 2019-05-02 PROCEDURE — 25010000002 DIPHENHYDRAMINE PER 50 MG: Performed by: INTERNAL MEDICINE

## 2019-05-02 PROCEDURE — 36415 COLL VENOUS BLD VENIPUNCTURE: CPT

## 2019-05-02 PROCEDURE — P9035 PLATELET PHERES LEUKOREDUCED: HCPCS

## 2019-05-02 PROCEDURE — 25010000002 METHYLPREDNISOLONE PER 125 MG: Performed by: INTERNAL MEDICINE

## 2019-05-02 PROCEDURE — 96374 THER/PROPH/DIAG INJ IV PUSH: CPT

## 2019-05-02 PROCEDURE — 85027 COMPLETE CBC AUTOMATED: CPT

## 2019-05-02 PROCEDURE — 36430 TRANSFUSION BLD/BLD COMPNT: CPT

## 2019-05-02 RX ORDER — SODIUM CHLORIDE 0.9 % (FLUSH) 0.9 %
10 SYRINGE (ML) INJECTION AS NEEDED
Status: DISCONTINUED | OUTPATIENT
Start: 2019-05-02 | End: 2019-05-02 | Stop reason: HOSPADM

## 2019-05-02 RX ORDER — ACETAMINOPHEN 325 MG/1
650 TABLET ORAL ONCE
Status: CANCELLED
Start: 2019-05-02 | End: 2019-05-02

## 2019-05-02 RX ORDER — ACETAMINOPHEN 325 MG/1
650 TABLET ORAL ONCE
Status: COMPLETED | OUTPATIENT
Start: 2019-05-02 | End: 2019-05-02

## 2019-05-02 RX ORDER — METHYLPREDNISOLONE SODIUM SUCCINATE 125 MG/2ML
125 INJECTION, POWDER, LYOPHILIZED, FOR SOLUTION INTRAMUSCULAR; INTRAVENOUS ONCE
Status: COMPLETED | OUTPATIENT
Start: 2019-05-02 | End: 2019-05-02

## 2019-05-02 RX ORDER — DIPHENHYDRAMINE HYDROCHLORIDE 50 MG/ML
25 INJECTION INTRAMUSCULAR; INTRAVENOUS ONCE
Status: COMPLETED | OUTPATIENT
Start: 2019-05-02 | End: 2019-05-02

## 2019-05-02 RX ORDER — DIPHENHYDRAMINE HYDROCHLORIDE 50 MG/ML
25 INJECTION INTRAMUSCULAR; INTRAVENOUS ONCE
Status: CANCELLED
Start: 2019-05-02 | End: 2019-05-02

## 2019-05-02 RX ORDER — FUROSEMIDE 10 MG/ML
20 INJECTION INTRAMUSCULAR; INTRAVENOUS ONCE
Status: CANCELLED
Start: 2019-05-02 | End: 2019-05-02

## 2019-05-02 RX ORDER — HEPARIN SODIUM (PORCINE) LOCK FLUSH IV SOLN 100 UNIT/ML 100 UNIT/ML
300 SOLUTION INTRAVENOUS ONCE
OUTPATIENT
Start: 2019-05-02

## 2019-05-02 RX ORDER — SODIUM CHLORIDE 9 MG/ML
250 INJECTION, SOLUTION INTRAVENOUS AS NEEDED
Status: DISCONTINUED | OUTPATIENT
Start: 2019-05-02 | End: 2019-05-02 | Stop reason: HOSPADM

## 2019-05-02 RX ORDER — SODIUM CHLORIDE 0.9 % (FLUSH) 0.9 %
10 SYRINGE (ML) INJECTION AS NEEDED
Status: CANCELLED | OUTPATIENT
Start: 2019-05-02

## 2019-05-02 RX ORDER — METHYLPREDNISOLONE SODIUM SUCCINATE 125 MG/2ML
125 INJECTION, POWDER, LYOPHILIZED, FOR SOLUTION INTRAMUSCULAR; INTRAVENOUS ONCE
Status: CANCELLED
Start: 2019-05-02 | End: 2019-05-02

## 2019-05-02 RX ADMIN — ACETAMINOPHEN 650 MG: 325 TABLET, FILM COATED ORAL at 11:34

## 2019-05-02 RX ADMIN — METHYLPREDNISOLONE SODIUM SUCCINATE 125 MG: 125 INJECTION, POWDER, FOR SOLUTION INTRAMUSCULAR; INTRAVENOUS at 11:38

## 2019-05-02 RX ADMIN — Medication 500 UNITS: at 14:20

## 2019-05-02 RX ADMIN — SODIUM CHLORIDE 250 ML: 9 INJECTION, SOLUTION INTRAVENOUS at 11:30

## 2019-05-02 RX ADMIN — DIPHENHYDRAMINE HYDROCHLORIDE 25 MG: 50 INJECTION, SOLUTION INTRAMUSCULAR; INTRAVENOUS at 11:36

## 2019-05-02 NOTE — THERAPY DISCHARGE NOTE
Acute Care - Occupational Therapy Discharge Summary  [facility]     Patient Name: Karoline Prater  : 1940  MRN: [id number]    Today's Date: 2019  Onset of Illness/Injury or Date of Surgery: 19    Date of Referral to OT: 19  Referring Physician: Dr. Peñaloza      Admit Date: 2019        OT Recommendation and Plan    Visit Dx:    ICD-10-CM ICD-9-CM   1. Melena K92.1 578.1   2. Impaired mobility and ADLs Z74.09 799.89   3. Impaired functional mobility, balance, gait, and endurance Z74.09 V49.89               Rehab Goal Summary     Row Name 19 0800             Transfer Goal 1 (OT)    Activity/Assistive Device (Transfer Goal 1, OT)  sit-to-stand/stand-to-sit;bed-to-chair/chair-to-bed;toilet  -TS      [place] Level/Cues Needed (Transfer Goal 1, OT)  supervision required  -TS      Time Frame (Transfer Goal 1, OT)  10 days  -TS      Progress/Outcome (Transfer Goal 1, OT)  goal not met  -TS         Bathing Goal 1 (OT)    Activity/Assistive Device (Bathing Goal 1, OT)  lower body bathing;upper body bathing  -TS      [place] Level/Cues Needed (Bathing Goal 1, OT)  supervision required  -TS      Time Frame (Bathing Goal 1, OT)  10 days  -TS      Progress/Outcomes (Bathing Goal 1, OT)  goal not met  -TS         Functional Mobility Goal 1 (OT)    [place] Level/Cues Needed (Functional Mobility Goal 1, OT)  supervision required;verbal cues required  -TS      Distance Goal 1 (Functional Mobility, OT)  Pt will ambulate to nutrition room for simple snack prep with S and no instance of LOB.  -TS      Time Frame (Functional Mobility Goal 1, OT)  10 days  -TS      Progress/Outcome (Functional Mobility Goal 1, OT)  goal not met  -TS         Patient Education Goal (OT)    Activity (Patient Education Goal, OT)  Pt will be I with BUE HEP in order to increased strength for greater I with ADLs and functional mobility.  -TS      [place]/Cues/Accuracy (Memory Goal 2, OT)  demonstrates  adequately;independent;verbalizes understanding  -TS      Time Frame (Patient Education Goal, OT)  10 days  -TS      Progress/Outcome (Patient Education Goal, OT)  goal not met  -TS        User Key  (r) = Recorded By, (t) = Taken By, (c) = Cosigned By    Initials Name Provider Type Discipline    Lucia Downs COTA/L Occupational Therapy Assistant OT          Outcome Measures     Row Name 04/30/19 0857             How much help from another person do you currently need...    Turning from your back to your side while in flat bed without using bedrails?  4  -MS      Moving from lying on back to sitting on the side of a flat bed without bedrails?  4  -MS      Moving to and from a bed to a chair (including a wheelchair)?  3  -MS      Standing up from a chair using your arms (e.g., wheelchair, bedside chair)?  3  -MS      Climbing 3-5 steps with a railing?  3  -MS      To walk in hospital room?  3  -MS      AM-PAC 6 Clicks Score  20  -MS         Functional Assessment    Outcome Measure Options  AM-PAC 6 Clicks Basic Mobility (PT)  -MS        User Key  (r) = Recorded By, (t) = Taken By, (c) = Cosigned By    Initials Name Provider Type    Kristen Buchanan, PT, DPT, NCS Physical Therapist          Therapy Suggested Charges     Code   Minutes Charges    None                 OT Discharge Summary  Reason for Discharge: Discharge from facility  Outcomes Achieved: Refer to plan of care for updates on goals achieved  Discharge Destination: Home with assist      PIA Roberts  5/2/2019

## 2019-05-02 NOTE — PATIENT INSTRUCTIONS
Blood Transfusion, Adult, Care After  This sheet gives you information about how to care for yourself after your procedure. Your doctor may also give you more specific instructions. If you have problems or questions, contact your doctor.  Follow these instructions at home:  · Take over-the-counter and prescription medicines only as told by your doctor.  · Go back to your normal activities as told by your doctor.  · Follow instructions from your doctor about how to take care of the area where an IV tube was put into your vein (insertion site). Make sure you:  ? Wash your hands with soap and water before you change your bandage (dressing). If there is no soap and water, use hand .  ? Change your bandage as told by your doctor.  · Check your IV insertion site every day for signs of infection. Check for:  ? More redness, swelling, or pain.  ? More fluid or blood.  ? Warmth.  ? Pus or a bad smell.  Contact a doctor if:  · You have more redness, swelling, or pain around the IV insertion site..  · You have more fluid or blood coming from the IV insertion site.  · Your IV insertion site feels warm to the touch.  · You have pus or a bad smell coming from the IV insertion site.  · Your pee (urine) turns pink, red, or brown.  · You feel weak after doing your normal activities.  Get help right away if:  · You have signs of a serious allergic or body defense (immune) system reaction, including:  ? Itchiness.  ? Hives.  ? Trouble breathing.  ? Anxiety.  ? Pain in your chest or lower back.  ? Fever, flushing, and chills.  ? Fast pulse.  ? Rash.  ? Watery poop (diarrhea).  ? Throwing up (vomiting).  ? Dark pee.  ? Serious headache.  ? Dizziness.  ? Stiff neck.  ? Yellow color in your face or the white parts of your eyes (jaundice).  Summary  · After a blood transfusion, return to your normal activities as told by your doctor.  · Every day, check for signs of infection where the IV tube was put into your vein.  · Some signs  of infection are warm skin, more redness and pain, more fluid or blood, and pus or a bad smell where the needle went in.  · Contact your doctor if you feel weak or have any unusual symptoms.  This information is not intended to replace advice given to you by your health care provider. Make sure you discuss any questions you have with your health care provider.  Document Released: 01/08/2016 Document Revised: 08/11/2017 Document Reviewed: 08/11/2017  [employer] Interactive Patient Education © 2019 [employer]

## 2019-05-03 ENCOUNTER — READMISSION MANAGEMENT (OUTPATIENT)
Dept: CALL CENTER | Facility: HOSPITAL | Age: 79
End: 2019-05-03

## 2019-05-03 LAB
ABO + RH BLD: NORMAL
ABO + RH BLD: NORMAL
BH BB BLOOD EXPIRATION DATE: NORMAL
BH BB BLOOD EXPIRATION DATE: NORMAL
BH BB BLOOD TYPE BARCODE: 5100
BH BB BLOOD TYPE BARCODE: 5100
BH BB DISPENSE STATUS: NORMAL
BH BB DISPENSE STATUS: NORMAL
BH BB PRODUCT CODE: NORMAL
BH BB PRODUCT CODE: NORMAL
BH BB UNIT NUMBER: NORMAL
BH BB UNIT NUMBER: NORMAL
UNIT  ABO: NORMAL
UNIT  ABO: NORMAL
UNIT  RH: NORMAL
UNIT  RH: NORMAL

## 2019-05-03 NOTE — OUTREACH NOTE
Medical Week 1 Survey      Responses   Facility patient discharged from?  [place]   Does the patient have one of the following disease processes/diagnoses(primary or secondary)?  Other   Is there a successful TCM telephone encounter documented?  No   Week 1 attempt successful?  No   Unsuccessful attempts  Attempt 1          Betsey Lewis RN

## 2019-05-06 ENCOUNTER — OFFICE VISIT (OUTPATIENT)
Dept: CARDIOLOGY | Age: 79
End: 2019-05-06
Payer: MEDICARE

## 2019-05-06 VITALS
SYSTOLIC BLOOD PRESSURE: 112 MMHG | BODY MASS INDEX: 24.41 KG/M2 | HEIGHT: 64 IN | HEART RATE: 97 BPM | DIASTOLIC BLOOD PRESSURE: 68 MMHG | WEIGHT: 143 LBS

## 2019-05-06 DIAGNOSIS — I42.8 CARDIOMYOPATHY, NONISCHEMIC (HCC): ICD-10-CM

## 2019-05-06 DIAGNOSIS — I10 ESSENTIAL HYPERTENSION: Primary | ICD-10-CM

## 2019-05-06 PROCEDURE — 99213 OFFICE O/P EST LOW 20 MIN: CPT | Performed by: NURSE PRACTITIONER

## 2019-05-06 RX ORDER — TAMSULOSIN HYDROCHLORIDE 0.4 MG/1
0.4 CAPSULE ORAL DAILY
COMMUNITY

## 2019-05-06 RX ORDER — SODIUM BICARBONATE 650 MG/1
650 TABLET ORAL 2 TIMES DAILY
COMMUNITY

## 2019-05-06 RX ORDER — SUCRALFATE ORAL 1 G/10ML
2 SUSPENSION ORAL 2 TIMES DAILY
COMMUNITY
End: 2019-08-20 | Stop reason: ALTCHOICE

## 2019-05-06 RX ORDER — ZOLPIDEM TARTRATE 5 MG/1
5 TABLET ORAL NIGHTLY PRN
COMMUNITY
End: 2019-08-20

## 2019-05-06 RX ORDER — PANTOPRAZOLE SODIUM 40 MG/1
40 TABLET, DELAYED RELEASE ORAL DAILY
COMMUNITY

## 2019-05-06 RX ORDER — AMLODIPINE BESYLATE 5 MG/1
5 TABLET ORAL DAILY
COMMUNITY
End: 2019-08-20 | Stop reason: ALTCHOICE
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
## 2019-05-07 ENCOUNTER — READMISSION MANAGEMENT (OUTPATIENT)
Dept: CALL CENTER | Facility: HOSPITAL | Age: 79
End: 2019-05-07

## 2019-05-07 NOTE — OUTREACH NOTE
Medical Week 1 Survey      Responses   Facility patient discharged from?  [place]   Does the patient have one of the following disease processes/diagnoses(primary or secondary)?  Other   Is there a successful TCM telephone encounter documented?  No   Week 1 attempt successful?  Yes   Call start time  1429   Call end time  1434   Discharge diagnosis  Mass of upper lobe of right lung   Meds reviewed with patient/caregiver?  Yes   Is the patient having any side effects they believe may be caused by any medication additions or changes?  No   Does the patient have all medications ordered at discharge?  Yes   Is the patient taking all medications as directed (includes completed medication regime)?  Yes   Does the patient have a primary care provider?   Yes   Does the patient have an appointment with their PCP within 7 days of discharge?  Yes   Has the patient kept scheduled appointments due by today?  Yes   Has home health visited the patient within 72 hours of discharge?  N/A   Psychosocial issues?  No   Did the patient receive a copy of their discharge instructions?  Yes   Nursing interventions  Reviewed instructions with patient   What is the patient's perception of their health status since discharge?  Improving   Is the patient/caregiver able to teach back signs and symptoms related to disease process for when to call PCP?  Yes   Is the patient/caregiver able to teach back signs and symptoms related to disease process for when to call 911?  Yes   Is the patient/caregiver able to teach back the hierarchy of who to call/visit for symptoms/problems? PCP, Specialist, Home health nurse, Urgent Care, ED, 911  Yes   Additional teach back comments  He had a rough night last night, he thinks he reacted to his nighttime meds.  He is doing some better otherwise.   Week 1 call completed?  Yes          Cecily Jones RN

## 2019-05-08 ENCOUNTER — READMISSION MANAGEMENT (OUTPATIENT)
Dept: CALL CENTER | Facility: HOSPITAL | Age: 79
End: 2019-05-08

## 2019-05-08 ENCOUNTER — HOSPITAL ENCOUNTER (INPATIENT)
Facility: HOSPITAL | Age: 79
LOS: 2 days | Discharge: HOME HEALTH | End: 2019-05-10
Attending: EMERGENCY MEDICINE | Admitting: FAMILY MEDICINE

## 2019-05-08 DIAGNOSIS — K31.89 MASS OF DUODENUM: ICD-10-CM

## 2019-05-08 DIAGNOSIS — D61.818 PANCYTOPENIA (HCC): ICD-10-CM

## 2019-05-08 DIAGNOSIS — K92.2 GASTROINTESTINAL HEMORRHAGE, UNSPECIFIED GASTROINTESTINAL HEMORRHAGE TYPE: Primary | ICD-10-CM

## 2019-05-08 DIAGNOSIS — R53.1 WEAKNESS: ICD-10-CM

## 2019-05-08 LAB
ABO GROUP BLD: NORMAL
ALBUMIN SERPL-MCNC: 2.9 G/DL (ref 3.5–5)
ALBUMIN/GLOB SERPL: 0.9 G/DL (ref 1.1–2.5)
ALP SERPL-CCNC: 85 U/L (ref 24–120)
ALT SERPL W P-5'-P-CCNC: 77 U/L (ref 0–54)
ANION GAP SERPL CALCULATED.3IONS-SCNC: 7 MMOL/L (ref 4–13)
APTT PPP: 36 SECONDS (ref 24.1–35)
AST SERPL-CCNC: 35 U/L (ref 7–45)
BILIRUB SERPL-MCNC: 0.3 MG/DL (ref 0.1–1)
BILIRUB UR QL STRIP: NEGATIVE
BLD GP AB SCN SERPL QL: NEGATIVE
BUN BLD-MCNC: 43 MG/DL (ref 5–21)
BUN/CREAT SERPL: 30.1 (ref 7–25)
CALCIUM SPEC-SCNC: 9.1 MG/DL (ref 8.4–10.4)
CHLORIDE SERPL-SCNC: 102 MMOL/L (ref 98–110)
CLARITY UR: CLEAR
CO2 SERPL-SCNC: 25 MMOL/L (ref 24–31)
COLOR UR: YELLOW
CREAT BLD-MCNC: 1.43 MG/DL (ref 0.5–1.4)
D-LACTATE SERPL-SCNC: 1.3 MMOL/L (ref 0.5–2)
DEPRECATED RDW RBC AUTO: 43.7 FL (ref 40–54)
DEVELOPER EXPIRATION DATE: ABNORMAL
DEVELOPER LOT NUMBER: 149
ERYTHROCYTE [DISTWIDTH] IN BLOOD BY AUTOMATED COUNT: 14.6 % (ref 12–15)
EXPIRATION DATE: ABNORMAL
FECAL OCCULT BLOOD SCREEN, POC: POSITIVE
GFR SERPL CREATININE-BSD FRML MDRD: 48 ML/MIN/1.73
GLOBULIN UR ELPH-MCNC: 3.4 GM/DL
GLUCOSE BLD-MCNC: 138 MG/DL (ref 70–100)
GLUCOSE UR STRIP-MCNC: NEGATIVE MG/DL
HCT VFR BLD AUTO: 21.1 % (ref 40–52)
HGB BLD-MCNC: 7 G/DL (ref 14–18)
HGB UR QL STRIP.AUTO: NEGATIVE
HYPOCHROMIA BLD QL: ABNORMAL
INR PPP: 0.94 (ref 0.91–1.09)
KETONES UR QL STRIP: NEGATIVE
LEUKOCYTE ESTERASE UR QL STRIP.AUTO: NEGATIVE
LIPASE SERPL-CCNC: 19 U/L (ref 23–203)
LYMPHOCYTES # BLD MANUAL: 0.05 10*3/MM3 (ref 0.72–4.86)
LYMPHOCYTES NFR BLD MANUAL: 2 % (ref 15–45)
Lab: 149
MCH RBC QN AUTO: 26.7 PG (ref 28–32)
MCHC RBC AUTO-ENTMCNC: 33.2 G/DL (ref 33–36)
MCV RBC AUTO: 80.5 FL (ref 82–95)
MICROCYTES BLD QL: ABNORMAL
NEGATIVE CONTROL: NEGATIVE
NEUTROPHILS # BLD AUTO: 2.65 10*3/MM3 (ref 1.87–8.4)
NEUTROPHILS NFR BLD MANUAL: 98 % (ref 39–78)
NITRITE UR QL STRIP: NEGATIVE
PH UR STRIP.AUTO: 7.5 [PH] (ref 5–8)
PLATELET # BLD AUTO: 24 10*3/MM3 (ref 130–400)
PMV BLD AUTO: 11.4 FL (ref 6–12)
POIKILOCYTOSIS BLD QL SMEAR: ABNORMAL
POSITIVE CONTROL: POSITIVE
POTASSIUM BLD-SCNC: 4.6 MMOL/L (ref 3.5–5.3)
PROT SERPL-MCNC: 6.3 G/DL (ref 6.3–8.7)
PROT UR QL STRIP: ABNORMAL
PROTHROMBIN TIME: 12.9 SECONDS (ref 11.9–14.6)
RBC # BLD AUTO: 2.62 10*6/MM3 (ref 4.8–5.9)
RH BLD: POSITIVE
SMALL PLATELETS BLD QL SMEAR: ABNORMAL
SODIUM BLD-SCNC: 134 MMOL/L (ref 135–145)
SP GR UR STRIP: 1.01 (ref 1–1.03)
T&S EXPIRATION DATE: NORMAL
UROBILINOGEN UR QL STRIP: ABNORMAL
WBC MORPH BLD: NORMAL
WBC NRBC COR # BLD: 2.7 10*3/MM3 (ref 4.8–10.8)

## 2019-05-08 PROCEDURE — 99284 EMERGENCY DEPT VISIT MOD MDM: CPT

## 2019-05-08 PROCEDURE — 86900 BLOOD TYPING SEROLOGIC ABO: CPT

## 2019-05-08 PROCEDURE — P9035 PLATELET PHERES LEUKOREDUCED: HCPCS

## 2019-05-08 PROCEDURE — 85007 BL SMEAR W/DIFF WBC COUNT: CPT | Performed by: EMERGENCY MEDICINE

## 2019-05-08 PROCEDURE — 82270 OCCULT BLOOD FECES: CPT | Performed by: EMERGENCY MEDICINE

## 2019-05-08 PROCEDURE — P9016 RBC LEUKOCYTES REDUCED: HCPCS

## 2019-05-08 PROCEDURE — 93010 ELECTROCARDIOGRAM REPORT: CPT | Performed by: INTERNAL MEDICINE

## 2019-05-08 PROCEDURE — 83690 ASSAY OF LIPASE: CPT | Performed by: EMERGENCY MEDICINE

## 2019-05-08 PROCEDURE — 85610 PROTHROMBIN TIME: CPT | Performed by: EMERGENCY MEDICINE

## 2019-05-08 PROCEDURE — 86900 BLOOD TYPING SEROLOGIC ABO: CPT | Performed by: EMERGENCY MEDICINE

## 2019-05-08 PROCEDURE — 81003 URINALYSIS AUTO W/O SCOPE: CPT | Performed by: EMERGENCY MEDICINE

## 2019-05-08 PROCEDURE — 86901 BLOOD TYPING SEROLOGIC RH(D): CPT | Performed by: EMERGENCY MEDICINE

## 2019-05-08 PROCEDURE — 85730 THROMBOPLASTIN TIME PARTIAL: CPT | Performed by: EMERGENCY MEDICINE

## 2019-05-08 PROCEDURE — 25010000002 IRON SUCROSE PER 1 MG: Performed by: FAMILY MEDICINE

## 2019-05-08 PROCEDURE — 36430 TRANSFUSION BLD/BLD COMPNT: CPT

## 2019-05-08 PROCEDURE — 93005 ELECTROCARDIOGRAM TRACING: CPT | Performed by: EMERGENCY MEDICINE

## 2019-05-08 PROCEDURE — 80053 COMPREHEN METABOLIC PANEL: CPT | Performed by: EMERGENCY MEDICINE

## 2019-05-08 PROCEDURE — 83605 ASSAY OF LACTIC ACID: CPT | Performed by: EMERGENCY MEDICINE

## 2019-05-08 PROCEDURE — 85025 COMPLETE CBC W/AUTO DIFF WBC: CPT | Performed by: EMERGENCY MEDICINE

## 2019-05-08 PROCEDURE — P9037 PLATE PHERES LEUKOREDU IRRAD: HCPCS

## 2019-05-08 PROCEDURE — 86850 RBC ANTIBODY SCREEN: CPT | Performed by: EMERGENCY MEDICINE

## 2019-05-08 PROCEDURE — 86923 COMPATIBILITY TEST ELECTRIC: CPT

## 2019-05-08 RX ORDER — TAMSULOSIN HYDROCHLORIDE 0.4 MG/1
0.4 CAPSULE ORAL NIGHTLY
Status: DISCONTINUED | OUTPATIENT
Start: 2019-05-08 | End: 2019-05-10 | Stop reason: HOSPADM

## 2019-05-08 RX ORDER — SODIUM CHLORIDE 9 MG/ML
50 INJECTION, SOLUTION INTRAVENOUS CONTINUOUS
Status: DISCONTINUED | OUTPATIENT
Start: 2019-05-08 | End: 2019-05-09

## 2019-05-08 RX ORDER — SODIUM BICARBONATE 650 MG/1
650 TABLET ORAL 2 TIMES DAILY
Status: DISCONTINUED | OUTPATIENT
Start: 2019-05-08 | End: 2019-05-10 | Stop reason: HOSPADM

## 2019-05-08 RX ORDER — SUCRALFATE ORAL 1 G/10ML
1 SUSPENSION ORAL EVERY 6 HOURS SCHEDULED
Status: DISCONTINUED | OUTPATIENT
Start: 2019-05-09 | End: 2019-05-10 | Stop reason: HOSPADM

## 2019-05-08 RX ORDER — ZOLPIDEM TARTRATE 5 MG/1
5 TABLET ORAL NIGHTLY PRN
Status: DISCONTINUED | OUTPATIENT
Start: 2019-05-08 | End: 2019-05-10 | Stop reason: HOSPADM

## 2019-05-08 RX ORDER — PANTOPRAZOLE SODIUM 40 MG/10ML
80 INJECTION, POWDER, LYOPHILIZED, FOR SOLUTION INTRAVENOUS ONCE
Status: COMPLETED | OUTPATIENT
Start: 2019-05-08 | End: 2019-05-08

## 2019-05-08 RX ADMIN — IRON SUCROSE 200 MG: 20 INJECTION, SOLUTION INTRAVENOUS at 23:32

## 2019-05-08 RX ADMIN — SODIUM CHLORIDE 8 MG/HR: 900 INJECTION INTRAVENOUS at 16:00

## 2019-05-08 RX ADMIN — PANTOPRAZOLE SODIUM 80 MG: 40 INJECTION, POWDER, FOR SOLUTION INTRAVENOUS at 15:36

## 2019-05-08 RX ADMIN — SODIUM CHLORIDE 1000 ML: 9 INJECTION, SOLUTION INTRAVENOUS at 15:54

## 2019-05-08 RX ADMIN — SODIUM CHLORIDE 50 ML/HR: 9 INJECTION, SOLUTION INTRAVENOUS at 23:32

## 2019-05-08 RX ADMIN — SODIUM CHLORIDE 8 MG/HR: 900 INJECTION INTRAVENOUS at 20:50

## 2019-05-08 NOTE — ED PROVIDER NOTES
[facility]  emergency department encounter      Pt Name: Karoline Prater  MRN: [id number]  Birthdate 1940  Date of evaluation: 5/8/2019      CHIEF COMPLAINT       Chief Complaint   Patient presents with   • Black or Bloody Stool       Nurses Notes reviewed and I agree except as noted in the HPI.      HISTORY OF PRESENT ILLNESS    Karoline Prater is a 78 y.o. male who presents to the emergency department complaining of 1 month of dark stools intermittently.  Patient currently undergoing chemotherapy for adenocarcinoma of the lung.  He is also seeing Dr. Jj for multiple gastric ulcers.  His oncologist is Dr. Michele.  Patient notes that he feels generally weak.  Patient recently admitted to the hospital for similar complaints.  Patient denies abdominal pain, chest pain, shortness of breath, fever, chills, dysuria.  Of note, patient has been blind in his left eye since he was struck by a rubber band at the age of 8 years old.    REVIEW OF SYSTEMS     All systems reviewed and otherwise negative except as listed above in HPI      PAST MEDICAL HISTORY     Past Medical History:   Diagnosis Date   • Arthritis    • Blindness of left eye    • BPH with obstruction/lower urinary tract symptoms    • Cancer (CMS/HCC)     stomach & lung   • GERD (gastroesophageal reflux disease)    • Hearing loss    • Hydronephrosis of left kidney    • Hypertension     pt was taken off of all of his medications for BP (atenolol, lisinopril, lasix) because his BP kept bottoming out so his primary dr told him to discontinue them 1-2 months ago (Jan/Feb 2019). pt states he takes no medications currently.   • Mass of upper lobe of right lung 02/2019    mass is shrinking on its own, so pt states Dr. Patel is just going to keep an eye on it and not do surgery right now.   • Pancreatic mass     pt states he had this in 2013 but it went away on its own. Now recent CT shows it has come back so he is going to have an  "ultrasound on 3/13/19.   • Shortness of breath        SURGICAL HISTORY      has a past surgical history that includes Cholecystectomy; Bronchoscopy (N/A, 10/24/2018); Esophagogastroduodenoscopy (N/A, 12/11/2018); Colonoscopy (N/A, 1/3/2019); Foot surgery (Right, 1966); Eye surgery (Left, 1964); cystoscopy, retrograde pyelogram and stent insertion (Left, 3/8/2019); and Esophagogastroduodenoscopy (N/A, 4/29/2019).    CURRENT MEDICATIONS        Medication List      ASK your doctor about these medications    amLODIPine 5 MG tablet  Commonly known as:  NORVASC  Take 1 tablet by mouth Daily.     folic acid 1 MG tablet  Commonly known as:  FOLVITE     megestrol 40 MG/ML suspension  Commonly known as:  MEGACE     melatonin 5 MG tablet tablet     MULTIVITAMIN ADULT PO     pantoprazole 40 MG EC tablet  Commonly known as:  PROTONIX  Take 1 tablet by mouth Daily.     sodium bicarbonate 650 MG tablet  Take 1 tablet by mouth 2 (Two) Times a Day.     sucralfate 1 GM/10ML suspension  Commonly known as:  CARAFATE  Take 20 mL by mouth 2 (Two) Times a Day.     tamsulosin 0.4 MG capsule 24 hr capsule  Commonly known as:  FLOMAX     zolpidem 5 MG tablet  Commonly known as:  AMBIEN          ALLERGIES     is allergic to penicillins.    FAMILY HISTORY     has no family status information on file.    family history is not on file.    SOCIAL HISTORY      reports that he quit smoking about 15 years ago. His smoking use included cigarettes. He quit after 49.00 years of use. He quit smokeless tobacco use about 49 years ago. His smokeless tobacco use included chew. He reports that he does not drink alcohol or use drugs.    PHYSICAL EXAM     INITIAL VITALS:  height is 162.6 cm (64\") and weight is 64 kg (141 lb). His oral temperature is 97.9 °F (36.6 °C). His blood pressure is 114/71 and his pulse is 86. His respiration is 20 and oxygen saturation is 97%.    Physical Exam    CONSTITUTIONAL: Well developed, well nourished, not diaphoretic nor " distressed  HENT: Normocephalic, atraumatic, oropharynx clear and moist  EYES: Right eye:  PERRL, EOM normal, no discharge, no scleral icterus.  Patient blind in left eye  NECK: ROM normal, supple, no tracheal deviation nor JVD, no stridor  CARDIOVASCULAR: Normal rate and rhythm, heart sounds normal, no rub no gallop, intact distal pulses, normal cap refill  PULMONARY: Normal effort and breath sounds, no distress, no wheezes, rhonchi or rales, no chest tenderness  ABDOMINAL: Soft, nontender, no guarding, no mass, no rebound, no hernia  GENITOURINARY/ANORECTAL: Nohemy STEVENS chaperoned exam.  Dark red stool which is guaiac positive but stool is not melanotic.  External hemorrhoids noted  MUSCULOSKELETAL: ROM normal, no tenderness nor deformity, no edema  NEUROLOGICAL: Alert, oriented x 3, normal tone, sensation normal  SKIN: Warm, dry, no erythema, no rash, normal color  PSYCH: Mood and affect normal, behavior normal, thought content and judgement normal.      DIAGNOSTIC RESULTS     EKG: All EKG's are interpreted by the Emergency Department Physician who either signs or Co-signs this chart in the absence of a cardiologist.  EKG performed at 1541 shows normal sinus rhythm with a rate of 83 bpm, normal axis, normal intervals, normal ST segments, normal T waves    RADIOLOGY: non-plain film images(s) such as CT, Ultrasound and MRI are read by the radiologist.  Plain radiographic images are visualized and preliminarily interpreted by the emergency physician unless otherwise stated below.  No radiology results for the last day           LABS:   Lab Results (last 24 hours)     Procedure Component Value Units Date/Time    CBC & Differential [[id number]] Collected:  05/08/19 1525    Specimen:  Blood Updated:  05/08/19 1626    Narrative:       The following orders were created for panel order CBC & Differential.  Procedure                               Abnormality         Status                     ---------                                -----------         ------                     CBC Auto Differential[[id number]]        Abnormal            Final result                 Please view results for these tests on the individual orders.    Comprehensive Metabolic Panel [[id number]]  (Abnormal) Collected:  05/08/19 1525    Specimen:  Blood from Arm, Right Updated:  05/08/19 1601     Glucose 138 mg/dL      BUN 43 mg/dL      Creatinine 1.43 mg/dL      Sodium 134 mmol/L      Potassium 4.6 mmol/L      Chloride 102 mmol/L      CO2 25.0 mmol/L      Calcium 9.1 mg/dL      Total Protein 6.3 g/dL      Albumin 2.90 g/dL      ALT (SGPT) 77 U/L      AST (SGOT) 35 U/L      Alkaline Phosphatase 85 U/L      Total Bilirubin 0.3 mg/dL      eGFR Non African Amer 48 mL/min/1.73      Globulin 3.4 gm/dL      A/G Ratio 0.9 g/dL      BUN/Creatinine Ratio 30.1     Anion Gap 7.0 mmol/L     Narrative:       GFR Normal >60  Chronic Kidney Disease <60  Kidney Failure <15    Protime-INR [[id number]]  (Normal) Collected:  05/08/19 1525    Specimen:  Blood from Arm, Right Updated:  05/08/19 1548     Protime 12.9 Seconds      INR 0.94    aPTT [[id number]]  (Abnormal) Collected:  05/08/19 1525    Specimen:  Blood from Arm, Right Updated:  05/08/19 1548     PTT 36.0 seconds     Lactic Acid, Plasma [[id number]]  (Normal) Collected:  05/08/19 1525    Specimen:  Blood from Arm, Right Updated:  05/08/19 1548     Lactate 1.3 mmol/L     Lipase [[id number]]  (Abnormal) Collected:  05/08/19 1525    Specimen:  Blood from Arm, Right Updated:  05/08/19 1559     Lipase 19 U/L     CBC Auto Differential [[id number]]  (Abnormal) Collected:  05/08/19 1525    Specimen:  Blood from Arm, Right Updated:  05/08/19 1626     WBC 2.70 10*3/mm3      RBC 2.62 10*6/mm3      Hemoglobin 7.0 g/dL      Hematocrit 21.1 %      MCV 80.5 fL      MCH 26.7 pg      MCHC 33.2 g/dL      RDW 14.6 %      RDW-SD 43.7 fl      MPV 11.4 fL      Platelets 24 10*3/mm3     Manual Differential [[id number]]  (Abnormal) Collected:  05/08/19  1525    Specimen:  Blood from Arm, Right Updated:  05/08/19 1626     Neutrophil % 98.0 %      Lymphocyte % 2.0 %      Neutrophils Absolute 2.65 10*3/mm3      Lymphocytes Absolute 0.05 10*3/mm3      Hypochromia Slight/1+     Microcytes Mod/2+     Poikilocytes Slight/1+     WBC Morphology Normal     Platelet Estimate Decreased    Urinalysis With Microscopic If Indicated (No Culture) - Urine, Clean Catch [[id number]]  (Abnormal) Collected:  05/08/19 1610    Specimen:  Urine, Clean Catch Updated:  05/08/19 1646     Color, UA Yellow     Appearance, UA Clear     pH, UA 7.5     Specific Gravity, UA 1.012     Glucose, UA Negative     Ketones, UA Negative     Bilirubin, UA Negative     Blood, UA Negative     Protein, UA Trace     Leuk Esterase, UA Negative     Nitrite, UA Negative     Urobilinogen, UA 0.2 E.U./dL    Narrative:       Urine microscopic not indicated.    POC Occult Blood Stool [[id number]]  (Abnormal) Collected:  05/08/19 1618    Specimen:  Stool Updated:  05/08/19 1619     Fecal Occult Blood Positive     Comment: specimen obtained by dr rhodes         Lot Number [id number]     Expiration Date 01-31-20     DEVELOPER LOT NUMBER 149     DEVELOPER EXPIRATION DATE 01-31-20     Positive Control Positive     Negative Control Negative          EMERGENCY DEPARTMENT COURSE:   Vitals:    Vitals:    05/08/19 1813 05/08/19 1816 05/08/19 1819 05/08/19 1820   BP:  120/68  114/71   BP Location:    Right arm   Patient Position:    Lying   Pulse: 88 84 94 86   Resp:    20   Temp:    97.9 °F (36.6 °C)   TempSrc:    Oral   SpO2: 96% 97% 94% 97%   Weight:       Height:           The patient was given the following medications:  Medications   pantoprazole (PROTONIX) injection 80 mg (80 mg Intravenous Given 5/8/19 1536)     And   pantoprazole (PROTONIX) 40 mg/100 mL (0.4 mg/mL) in 0.9% NS IVPB (8 mg/hr Intravenous Currently Infusing 5/8/19 1816)   sodium chloride 0.9 % bolus 1,000 mL (0 mL Intravenous Stopped 5/8/19 1712)            CRITICAL  CARE:  none    CONSULTS:  [facility]- GI    PROCEDURES:  Procedures        Patient presents the emergency department complaining of dark stools.  He has a history of bleeding ulcers.  He is pancytopenic.  I spoke on the phone with Dr. Bansal who agrees to be a consultant on this patient.  I spoke to Dr. Doss who agrees to admit this patient who is stable at time of admission and agreeable with plan of care.  Of note, PRBCs and platelets were ordered for this patient.    FINAL IMPRESSION      1. Gastrointestinal hemorrhage, unspecified gastrointestinal hemorrhage type    2. Pancytopenia (CMS/HCC)          DISPOSITION/PLAN   Admit      PATIENT REFERRED TO:  No follow-up provider specified.    DISCHARGE MEDICATIONS:     Medication List      ASK your doctor about these medications    amLODIPine 5 MG tablet  Commonly known as:  NORVASC  Take 1 tablet by mouth Daily.     folic acid 1 MG tablet  Commonly known as:  FOLVITE     megestrol 40 MG/ML suspension  Commonly known as:  MEGACE     melatonin 5 MG tablet tablet     MULTIVITAMIN ADULT PO     pantoprazole 40 MG EC tablet  Commonly known as:  PROTONIX  Take 1 tablet by mouth Daily.     sodium bicarbonate 650 MG tablet  Take 1 tablet by mouth 2 (Two) Times a Day.     sucralfate 1 GM/10ML suspension  Commonly known as:  CARAFATE  Take 20 mL by mouth 2 (Two) Times a Day.     tamsulosin 0.4 MG capsule 24 hr capsule  Commonly known as:  FLOMAX     zolpidem 5 MG tablet  Commonly known as:  AMBIEN          (Please note that portions of this note were completed with a voice recognition program.)    DO Fanny Lopez Jason Paul,   05/08/19 [id number]       Sourav Escobedo,   05/08/19 6660

## 2019-05-08 NOTE — H&P
[facility] Medicine Services  HISTORY AND PHYSICAL    Date of Admission: 5/8/2019  Primary Care Physician: Irving Alexis MD    Subjective     Chief Complaint: Rectal Bleeding    History of Present Illness  Mr. Prater is a 78 year old gentleman with a history of abdominal malignancy and lung cancer.  He follows with Dr. Michele.  He is currently undergoing chemo and is scheduled to do chemo tomorrow.  Today he was having a bowel movement and had bright red blood come out.  He has had dark, black-tarry stools for several months now.  He denies CP or SOA.  He denies light-headedness or dizziness.      He states he has ulcers in his large intestine.  Review of previous EGD wit Dr. Jj reveals upper GI ulcers.      Review of Systems   Constitutional: Positive for fatigue. Negative for fever.   HENT: Negative for congestion and ear pain.    Eyes: Negative for redness and visual disturbance.   Respiratory: Negative for cough, shortness of breath and wheezing.    Cardiovascular: Negative for chest pain and palpitations.   Gastrointestinal: Positive for anal bleeding and blood in stool. Negative for abdominal pain, diarrhea, nausea and vomiting.   Endocrine: Negative for cold intolerance and heat intolerance.   Genitourinary: Negative for dysuria and frequency.   Musculoskeletal: Negative for arthralgias and back pain.   Skin: Negative for rash and wound.   Neurological: Positive for weakness. Negative for dizziness and headaches.   Psychiatric/Behavioral: Negative for confusion. The patient is not nervous/anxious.         Otherwise complete ROS reviewed and negative except as mentioned in the HPI.    Past Medical History:   Past Medical History:   Diagnosis Date   • Arthritis    • Blindness of left eye    • BPH with obstruction/lower urinary tract symptoms    • Cancer (CMS/HCC)     stomach & lung   • GERD (gastroesophageal reflux disease)    • Hearing loss    • Hydronephrosis  of left kidney    • Hypertension     pt was taken off of all of his medications for BP (atenolol, lisinopril, lasix) because his BP kept bottoming out so his primary dr told him to discontinue them 1-2 months ago (Jan/Feb 2019). pt states he takes no medications currently.   • Mass of upper lobe of right lung 02/2019    mass is shrinking on its own, so pt states Dr. Patel is just going to keep an eye on it and not do surgery right now.   • Pancreatic mass     pt states he had this in 2013 but it went away on its own. Now recent CT shows it has come back so he is going to have an ultrasound on 3/13/19.   • Shortness of breath      Past Surgical History:  Past Surgical History:   Procedure Laterality Date   • BRONCHOSCOPY N/A 10/24/2018    Procedure: BRONCHOSCOPY WITH BIOPSY, EBUS;  Surgeon: Gareth Becerra MD;  Location: [facility] OR;  Service: Pulmonary   • CHOLECYSTECTOMY     • COLONOSCOPY N/A 1/3/2019    Procedure: COLONOSCOPY WITH ANESTHESIA;  Surgeon: Randy Somers DO;  Location: [facility] ENDOSCOPY;  Service: Gastroenterology   • CYSTOSCOPY, RETROGRADE PYELOGRAM AND STENT INSERTION Left 3/8/2019    Procedure: CYSTOSCOPY RETROGRADE BILATERAL PYELOGRAM;  Surgeon: Jos Sylvester MD;  Location: [facility] OR;  Service: Urology   • ENDOSCOPY N/A 12/11/2018    Procedure: ESOPHAGOGASTRODUODENOSCOPY WITH ANESTHESIA;  Surgeon: Randy Somers DO;  Location: [facility] ENDOSCOPY;  Service: Gastroenterology   • ENDOSCOPY N/A 4/29/2019    Procedure: ESOPHAGOGASTRODUODENOSCOPY WITH ANESTHESIA;  Surgeon: Lilliam Jj MD;  Location: [facility] OR;  Service: Gastroenterology   • EYE SURGERY Left 1964   • FOOT SURGERY Right 1966    joint     Social History:  reports that he quit smoking about 15 years ago. His smoking use included cigarettes. He quit after 49.00 years of use. He quit smokeless tobacco use about 49 years ago. His smokeless tobacco use included chew. He reports that he does not drink alcohol or use  "drugs.    Family History: HTN    Allergies:  Allergies   Allergen Reactions   • Penicillins Hives     Medications:  Prior to Admission medications    Medication Sig Start Date End Date Taking? Authorizing Provider   amLODIPine (NORVASC) 5 MG tablet Take 1 tablet by mouth Daily. 5/1/19   Hang Reddy DO   folic acid (FOLVITE) 1 MG tablet Take 1 mg by mouth Daily.    Eliecer Schultz MD   megestrol (MEGACE) 40 MG/ML suspension Take 800 mg by mouth Daily.    Eliecer Schultz MD   melatonin 5 MG tablet tablet Take 5 mg by mouth At Night As Needed (sleep).    Eliecer Schultz MD   Multiple Vitamins-Minerals (MULTIVITAMIN ADULT PO) Take 1 tablet by mouth Daily.    Eliecer Schultz MD   pantoprazole (PROTONIX) 40 MG EC tablet Take 1 tablet by mouth Daily. 4/30/19   Hang Reddy DO   sodium bicarbonate 650 MG tablet Take 1 tablet by mouth 2 (Two) Times a Day. 4/30/19   Hang Reddy DO   sucralfate (CARAFATE) 1 GM/10ML suspension Take 20 mL by mouth 2 (Two) Times a Day. 4/30/19   Hang Reddy DO   tamsulosin (FLOMAX) 0.4 MG capsule 24 hr capsule Take 1 capsule by mouth Every Night.    Eliecer Schultz MD   zolpidem (AMBIEN) 5 MG tablet Take 5 mg by mouth At Night As Needed for Sleep.    Eliecer Schultz MD     Objective     Vital Signs: /63   Pulse 85   Temp 97.7 °F (36.5 °C) (Oral)   Resp 19   Ht 162.6 cm (64\")   Wt 64 kg (141 lb)   SpO2 98%   BMI 24.20 kg/m²   Physical Exam   Constitutional: He is oriented to person, place, and time. He appears well-developed and well-nourished.   HENT:   Head: Normocephalic and atraumatic.   Right Ear: External ear normal.   Left Ear: External ear normal.   Nose: Nose normal.   Mouth/Throat: Oropharynx is clear and moist.   Eyes: Conjunctivae and EOM are normal. Right eye exhibits no discharge. No scleral icterus.   L eye s/p enucleation   Neck: Normal range of motion. Neck supple. No tracheal deviation present. No " thyromegaly present.   Cardiovascular: Normal rate, regular rhythm, normal heart sounds and intact distal pulses. Exam reveals no gallop and no friction rub.   No murmur heard.  Pulmonary/Chest: Effort normal and breath sounds normal. No stridor. No respiratory distress. He has no wheezes. He has no rales. He exhibits no tenderness.   Abdominal: Soft. Bowel sounds are normal. He exhibits no distension and no mass. There is no tenderness. There is no rebound and no guarding. No hernia.   Musculoskeletal: Normal range of motion. He exhibits no edema or deformity.   Lymphadenopathy:     He has no cervical adenopathy.   Neurological: He is alert and oriented to person, place, and time. He has normal reflexes. He displays normal reflexes. No cranial nerve deficit. He exhibits normal muscle tone. Coordination normal.   Skin: Skin is warm and dry. No rash noted. No erythema. No pallor.   Psychiatric: He has a normal mood and affect. His behavior is normal. Judgment and thought content normal.   Vitals reviewed.          Results Reviewed:  Lab Results (last 24 hours)     Procedure Component Value Units Date/Time    Urinalysis With Microscopic If Indicated (No Culture) - Urine, Clean Catch [[id number]]  (Abnormal) Collected:  05/08/19 1610    Specimen:  Urine, Clean Catch Updated:  05/08/19 1646     Color, UA Yellow     Appearance, UA Clear     pH, UA 7.5     Specific Gravity, UA 1.012     Glucose, UA Negative     Ketones, UA Negative     Bilirubin, UA Negative     Blood, UA Negative     Protein, UA Trace     Leuk Esterase, UA Negative     Nitrite, UA Negative     Urobilinogen, UA 0.2 E.U./dL    Narrative:       Urine microscopic not indicated.    Manual Differential [[id number]]  (Abnormal) Collected:  05/08/19 1525    Specimen:  Blood from Arm, Right Updated:  05/08/19 1626     Neutrophil % 98.0 %      Lymphocyte % 2.0 %      Neutrophils Absolute 2.65 10*3/mm3      Lymphocytes Absolute 0.05 10*3/mm3      Hypochromia  Slight/1+     Microcytes Mod/2+     Poikilocytes Slight/1+     WBC Morphology Normal     Platelet Estimate Decreased    CBC & Differential [[id number]] Collected:  05/08/19 1525    Specimen:  Blood Updated:  05/08/19 1626    Narrative:       The following orders were created for panel order CBC & Differential.  Procedure                               Abnormality         Status                     ---------                               -----------         ------                     CBC Auto Differential[[id number]]        Abnormal            Final result                 Please view results for these tests on the individual orders.    CBC Auto Differential [[id number]]  (Abnormal) Collected:  05/08/19 1525    Specimen:  Blood from Arm, Right Updated:  05/08/19 1626     WBC 2.70 10*3/mm3      RBC 2.62 10*6/mm3      Hemoglobin 7.0 g/dL      Hematocrit 21.1 %      MCV 80.5 fL      MCH 26.7 pg      MCHC 33.2 g/dL      RDW 14.6 %      RDW-SD 43.7 fl      MPV 11.4 fL      Platelets 24 10*3/mm3     POC Occult Blood Stool [[id number]]  (Abnormal) Collected:  05/08/19 1618    Specimen:  Stool Updated:  05/08/19 1619     Fecal Occult Blood Positive     Comment: specimen obtained by dr rhodes         Lot Number 149     Expiration Date 01-31-20     DEVELOPER LOT NUMBER 149     DEVELOPER EXPIRATION DATE 01-31-20     Positive Control Positive     Negative Control Negative    Comprehensive Metabolic Panel [[id number]]  (Abnormal) Collected:  05/08/19 1525    Specimen:  Blood from Arm, Right Updated:  05/08/19 1601     Glucose 138 mg/dL      BUN 43 mg/dL      Creatinine 1.43 mg/dL      Sodium 134 mmol/L      Potassium 4.6 mmol/L      Chloride 102 mmol/L      CO2 25.0 mmol/L      Calcium 9.1 mg/dL      Total Protein 6.3 g/dL      Albumin 2.90 g/dL      ALT (SGPT) 77 U/L      AST (SGOT) 35 U/L      Alkaline Phosphatase 85 U/L      Total Bilirubin 0.3 mg/dL      eGFR Non African Amer 48 mL/min/1.73      Globulin 3.4 gm/dL      A/G Ratio 0.9  g/dL      BUN/Creatinine Ratio 30.1     Anion Gap 7.0 mmol/L     Narrative:       GFR Normal >60  Chronic Kidney Disease <60  Kidney Failure <15    Lipase [[id number]]  (Abnormal) Collected:  05/08/19 1525    Specimen:  Blood from Arm, Right Updated:  05/08/19 1559     Lipase 19 U/L     Lactic Acid, Plasma [[id number]]  (Normal) Collected:  05/08/19 1525    Specimen:  Blood from Arm, Right Updated:  05/08/19 1548     Lactate 1.3 mmol/L     Protime-INR [[id number]]  (Normal) Collected:  05/08/19 1525    Specimen:  Blood from Arm, Right Updated:  05/08/19 1548     Protime 12.9 Seconds      INR 0.94    aPTT [[id number]]  (Abnormal) Collected:  05/08/19 1525    Specimen:  Blood from Arm, Right Updated:  05/08/19 1548     PTT 36.0 seconds         Imaging Results (last 24 hours)     ** No results found for the last 24 hours. **        I have personally reviewed and interpreted the radiology studies and ECG obtained at time of admission.     Assessment / Plan     Assessment:   Active Hospital Problems    Diagnosis   • Gastrointestinal hemorrhage     1.  GI bleed  -Protonix  -plts; prbc's   -trend H&H  -Consult GI    2.  ABLA on Chronic anemia  -Transfuse PRBC's, plts  -consult GI  -protonix    3.  Pancytopenia due to chemotherapy  -consult heme-onc    4.  PUD  -protonix  -Carafate  -consult GI    5.  Thrombocytopenia due to chemotherapy  -Transfused plts  -Heme-onc following  -trend     6.  Adenocarcinoma of right lung  -Heme-onc following     7.  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  Left hydronephrosis  -follows with Dr. Sylvester as an outpt  -Flomax     9.  HTN  -Norvasc     10.  GERD  -Protonix  -Carafate     11.  HLD  -Elevated LFT's, will hold off on adding Statin     12.  BPH  -flomax     13.  S/p enucleation  -supportive care    14.  Stage 3 CKD  -gentle hydration  -monitor        Code Status: Full     I discussed the patient's findings and my recommendations with the  patient, patient's son    Estimated length of stay 2-3 days    Kenneth Prater MD   05/08/19   7:08 PM

## 2019-05-09 ENCOUNTER — ANESTHESIA EVENT (OUTPATIENT)
Dept: GASTROENTEROLOGY | Facility: HOSPITAL | Age: 79
End: 2019-05-09

## 2019-05-09 ENCOUNTER — ANESTHESIA (OUTPATIENT)
Dept: GASTROENTEROLOGY | Facility: HOSPITAL | Age: 79
End: 2019-05-09

## 2019-05-09 LAB
ANION GAP SERPL CALCULATED.3IONS-SCNC: 7 MMOL/L (ref 4–13)
BASOPHILS # BLD AUTO: 0 10*3/MM3 (ref 0–0.2)
BASOPHILS # BLD AUTO: 0 10*3/MM3 (ref 0–0.2)
BASOPHILS NFR BLD AUTO: 0 % (ref 0–2)
BASOPHILS NFR BLD AUTO: 0 % (ref 0–2)
BUN BLD-MCNC: 38 MG/DL (ref 5–21)
BUN/CREAT SERPL: 30.4 (ref 7–25)
CALCIUM SPEC-SCNC: 8.9 MG/DL (ref 8.4–10.4)
CHLORIDE SERPL-SCNC: 108 MMOL/L (ref 98–110)
CO2 SERPL-SCNC: 22 MMOL/L (ref 24–31)
CREAT BLD-MCNC: 1.25 MG/DL (ref 0.5–1.4)
DEPRECATED RDW RBC AUTO: 43.2 FL (ref 40–54)
DEPRECATED RDW RBC AUTO: 43.8 FL (ref 40–54)
EOSINOPHIL # BLD AUTO: 0 10*3/MM3 (ref 0–0.7)
EOSINOPHIL # BLD AUTO: 0.03 10*3/MM3 (ref 0–0.7)
EOSINOPHIL NFR BLD AUTO: 0 % (ref 0–4)
EOSINOPHIL NFR BLD AUTO: 0.8 % (ref 0–4)
ERYTHROCYTE [DISTWIDTH] IN BLOOD BY AUTOMATED COUNT: 14.9 % (ref 12–15)
ERYTHROCYTE [DISTWIDTH] IN BLOOD BY AUTOMATED COUNT: 14.9 % (ref 12–15)
GFR SERPL CREATININE-BSD FRML MDRD: 56 ML/MIN/1.73
GLUCOSE BLD-MCNC: 114 MG/DL (ref 70–100)
HCT VFR BLD AUTO: 25.1 % (ref 40–52)
HCT VFR BLD AUTO: 25.2 % (ref 40–52)
HCT VFR BLD AUTO: 25.8 % (ref 40–52)
HCT VFR BLD AUTO: 29 % (ref 40–52)
HCT VFR BLD AUTO: 30 % (ref 40–52)
HGB BLD-MCNC: 10.1 G/DL (ref 14–18)
HGB BLD-MCNC: 8.3 G/DL (ref 14–18)
HGB BLD-MCNC: 8.4 G/DL (ref 14–18)
HGB BLD-MCNC: 8.6 G/DL (ref 14–18)
HGB BLD-MCNC: 9.7 G/DL (ref 14–18)
IMM GRANULOCYTES # BLD AUTO: 0.04 10*3/MM3 (ref 0–0.05)
IMM GRANULOCYTES # BLD AUTO: 0.06 10*3/MM3 (ref 0–0.05)
IMM GRANULOCYTES NFR BLD AUTO: 1.4 % (ref 0–5)
IMM GRANULOCYTES NFR BLD AUTO: 1.5 % (ref 0–5)
LYMPHOCYTES # BLD AUTO: 0.31 10*3/MM3 (ref 0.72–4.86)
LYMPHOCYTES # BLD AUTO: 0.33 10*3/MM3 (ref 0.72–4.86)
LYMPHOCYTES NFR BLD AUTO: 11.8 % (ref 15–45)
LYMPHOCYTES NFR BLD AUTO: 7.9 % (ref 15–45)
MCH RBC QN AUTO: 26 PG (ref 28–32)
MCH RBC QN AUTO: 27 PG (ref 28–32)
MCHC RBC AUTO-ENTMCNC: 32.6 G/DL (ref 33–36)
MCHC RBC AUTO-ENTMCNC: 34.3 G/DL (ref 33–36)
MCV RBC AUTO: 78.9 FL (ref 82–95)
MCV RBC AUTO: 79.9 FL (ref 82–95)
MONOCYTES # BLD AUTO: 0.14 10*3/MM3 (ref 0.19–1.3)
MONOCYTES # BLD AUTO: 0.24 10*3/MM3 (ref 0.19–1.3)
MONOCYTES NFR BLD AUTO: 5 % (ref 4–12)
MONOCYTES NFR BLD AUTO: 6.1 % (ref 4–12)
NEUTROPHILS # BLD AUTO: 2.29 10*3/MM3 (ref 1.87–8.4)
NEUTROPHILS # BLD AUTO: 3.27 10*3/MM3 (ref 1.87–8.4)
NEUTROPHILS NFR BLD AUTO: 81.8 % (ref 39–78)
NEUTROPHILS NFR BLD AUTO: 83.7 % (ref 39–78)
NRBC BLD AUTO-RTO: 0 /100 WBC (ref 0–0.2)
NRBC BLD AUTO-RTO: 0 /100 WBC (ref 0–0.2)
PLATELET # BLD AUTO: 77 10*3/MM3 (ref 130–400)
PLATELET # BLD AUTO: 77 10*3/MM3 (ref 130–400)
PMV BLD AUTO: 10 FL (ref 6–12)
PMV BLD AUTO: 11.3 FL (ref 6–12)
POTASSIUM BLD-SCNC: 4.3 MMOL/L (ref 3.5–5.3)
RBC # BLD AUTO: 3.18 10*6/MM3 (ref 4.8–5.9)
RBC # BLD AUTO: 3.23 10*6/MM3 (ref 4.8–5.9)
SODIUM BLD-SCNC: 137 MMOL/L (ref 135–145)
WBC NRBC COR # BLD: 2.8 10*3/MM3 (ref 4.8–10.8)
WBC NRBC COR # BLD: 3.91 10*3/MM3 (ref 4.8–10.8)

## 2019-05-09 PROCEDURE — 25010000002 PROPOFOL 10 MG/ML EMULSION: Performed by: NURSE ANESTHETIST, CERTIFIED REGISTERED

## 2019-05-09 PROCEDURE — 43235 EGD DIAGNOSTIC BRUSH WASH: CPT | Performed by: INTERNAL MEDICINE

## 2019-05-09 PROCEDURE — 85018 HEMOGLOBIN: CPT | Performed by: INTERNAL MEDICINE

## 2019-05-09 PROCEDURE — 85018 HEMOGLOBIN: CPT | Performed by: FAMILY MEDICINE

## 2019-05-09 PROCEDURE — 99222 1ST HOSP IP/OBS MODERATE 55: CPT | Performed by: NURSE PRACTITIONER

## 2019-05-09 PROCEDURE — 85014 HEMATOCRIT: CPT | Performed by: FAMILY MEDICINE

## 2019-05-09 PROCEDURE — 25010000002 IRON SUCROSE PER 1 MG: Performed by: FAMILY MEDICINE

## 2019-05-09 PROCEDURE — 80048 BASIC METABOLIC PNL TOTAL CA: CPT | Performed by: FAMILY MEDICINE

## 2019-05-09 PROCEDURE — 0DJ08ZZ INSPECTION OF UPPER INTESTINAL TRACT, VIA NATURAL OR ARTIFICIAL OPENING ENDOSCOPIC: ICD-10-PCS | Performed by: INTERNAL MEDICINE

## 2019-05-09 PROCEDURE — 85025 COMPLETE CBC W/AUTO DIFF WBC: CPT | Performed by: INTERNAL MEDICINE

## 2019-05-09 PROCEDURE — 85014 HEMATOCRIT: CPT | Performed by: INTERNAL MEDICINE

## 2019-05-09 RX ORDER — SODIUM CHLORIDE 0.9 % (FLUSH) 0.9 %
3 SYRINGE (ML) INJECTION EVERY 12 HOURS SCHEDULED
Status: DISCONTINUED | OUTPATIENT
Start: 2019-05-09 | End: 2019-05-10 | Stop reason: HOSPADM

## 2019-05-09 RX ORDER — PROPOFOL 10 MG/ML
VIAL (ML) INTRAVENOUS AS NEEDED
Status: DISCONTINUED | OUTPATIENT
Start: 2019-05-09 | End: 2019-05-09 | Stop reason: SURG

## 2019-05-09 RX ORDER — SODIUM CHLORIDE 0.9 % (FLUSH) 0.9 %
3-10 SYRINGE (ML) INJECTION AS NEEDED
Status: DISCONTINUED | OUTPATIENT
Start: 2019-05-09 | End: 2019-05-10 | Stop reason: HOSPADM

## 2019-05-09 RX ORDER — LIDOCAINE HYDROCHLORIDE 20 MG/ML
INJECTION, SOLUTION INFILTRATION; PERINEURAL AS NEEDED
Status: DISCONTINUED | OUTPATIENT
Start: 2019-05-09 | End: 2019-05-09 | Stop reason: SURG

## 2019-05-09 RX ORDER — SODIUM CHLORIDE 9 MG/ML
100 INJECTION, SOLUTION INTRAVENOUS CONTINUOUS
Status: DISCONTINUED | OUTPATIENT
Start: 2019-05-09 | End: 2019-05-09

## 2019-05-09 RX ORDER — PANTOPRAZOLE SODIUM 40 MG/1
40 TABLET, DELAYED RELEASE ORAL
Status: DISCONTINUED | OUTPATIENT
Start: 2019-05-10 | End: 2019-05-10 | Stop reason: HOSPADM

## 2019-05-09 RX ADMIN — SUCRALFATE 1 G: 1 SUSPENSION ORAL at 00:11

## 2019-05-09 RX ADMIN — PROPOFOL 100 MG: 10 INJECTION, EMULSION INTRAVENOUS at 11:32

## 2019-05-09 RX ADMIN — SODIUM CHLORIDE 8 MG/HR: 900 INJECTION INTRAVENOUS at 00:52

## 2019-05-09 RX ADMIN — SUCRALFATE 1 G: 1 SUSPENSION ORAL at 23:32

## 2019-05-09 RX ADMIN — ZOLPIDEM TARTRATE 5 MG: 5 TABLET ORAL at 23:59

## 2019-05-09 RX ADMIN — SODIUM CHLORIDE: 9 INJECTION, SOLUTION INTRAVENOUS at 11:29

## 2019-05-09 RX ADMIN — LIDOCAINE HYDROCHLORIDE 100 MG: 20 INJECTION, SOLUTION INFILTRATION; PERINEURAL at 11:32

## 2019-05-09 RX ADMIN — SODIUM BICARBONATE 650 MG: 650 TABLET ORAL at 20:20

## 2019-05-09 RX ADMIN — IRON SUCROSE 200 MG: 20 INJECTION, SOLUTION INTRAVENOUS at 21:36

## 2019-05-09 RX ADMIN — SUCRALFATE 1 G: 1 SUSPENSION ORAL at 20:19

## 2019-05-09 RX ADMIN — SODIUM CHLORIDE 8 MG/HR: 900 INJECTION INTRAVENOUS at 05:24

## 2019-05-09 RX ADMIN — TAMSULOSIN HYDROCHLORIDE 0.4 MG: 0.4 CAPSULE ORAL at 20:20

## 2019-05-09 RX ADMIN — SODIUM CHLORIDE, PRESERVATIVE FREE 3 ML: 5 INJECTION INTRAVENOUS at 20:20

## 2019-05-09 NOTE — OUTREACH NOTE
Medical Week 2 Survey      Responses   Facility patient discharged from?  [facility] [place]   Does the patient have one of the following disease processes/diagnoses(primary or secondary)?  Other   Week 2 attempt successful?  No   Revoke  Readmitted          Starla Mitchell RN

## 2019-05-09 NOTE — CONSULTS
[facility] Gastroenterology  Initial Inpatient Consult Note    Referring Provider: Kenneth Prater MD    Reason for Consultation: GI bleed    Subjective     History of present illness:        78-year-old male admitted with GI bleed, acute blood loss anemia on chronic anemia, pancytopenia, and  acute injury.        Patient states that he had a black stool yesterday with some red blood.  He has had no bleeding since then.  Prior to that he had brown stool.  He was taking Protonix daily at home.  He denies abdominal pain.  Denies nausea vomiting.  Denies fevers or chills.    Of note he was recently hospitalized and seen by Dr. Jj.  He had an upper endoscopy  4-29-19, this procedure was done in the OR with a defibrillator patch due to recent history of V. tach after being given sedation for endoscopic ultrasound and required intubation/ACLS March 2019.   LA Grade B reflux esophagitis.  - Normal stomach. Biopsied.  - One non-bleeding duodenal ulcer with no stigmata of bleeding.  - Multiple non-bleeding duodenal ulcers with no stigmata of bleeding.  H. pylori negative    He has history of pancreatic mass that dates back to 2004.  He had an endoscopic ultrasound 2004 was nondiagnostic.  Attempted to repeat EUS March 2019 resulted in V. tach and the procedure was aborted.  He has history of a choledochojejunostomy and Klaudia-en-Y anastomosis.  Prior to discharge on April 30 Dr. Jj noted that the patient will need EUS as has been planned with Dr. Bryson Moe at [facility].  The patient states that he does not have a date for that procedure.    Dr. Jj also ordered a gastrin level April 2019 that was normal.    The patient also has lung cancer and is being followed by Dr. Michele.  He received his first dose of chemotherapy about 3 weeks ago.    Colonoscopy January 2019 noting polyps, recommended recall 5 years.    Labs reviewed.  He has received packed red blood cell and platelet transfusion.      Past Medical  History:  Past Medical History:   Diagnosis Date   • Arthritis    • Blindness of left eye    • BPH with obstruction/lower urinary tract symptoms    • Cancer (CMS/HCC)     stomach & lung   • CHF (congestive heart failure) (CMS/HCC)    • GERD (gastroesophageal reflux disease)    • Hearing loss    • History of transfusion    • Hydronephrosis of left kidney    • Hyperlipidemia    • Hypertension     pt was taken off of all of his medications for BP (atenolol, lisinopril, lasix) because his BP kept bottoming out so his primary dr told him to discontinue them 1-2 months ago (Jan/Feb 2019). pt states he takes no medications currently.   • Mass of upper lobe of right lung 02/2019    mass is shrinking on its own, so pt states Dr. Patel is just going to keep an eye on it and not do surgery right now.   • Pancreatic mass     pt states he had this in 2013 but it went away on its own. Now recent CT shows it has come back so he is going to have an ultrasound on 3/13/19.   • Shortness of breath        Past Surgical History:  Past Surgical History:   Procedure Laterality Date   • ABDOMINAL SURGERY     • BRONCHOSCOPY N/A 10/24/2018    Procedure: BRONCHOSCOPY WITH BIOPSY, EBUS;  Surgeon: Gareth Becerra MD;  Location: [facility] OR;  Service: Pulmonary   • CHOLECYSTECTOMY     • COLONOSCOPY N/A 1/3/2019    Procedure: COLONOSCOPY WITH ANESTHESIA;  Surgeon: Randy Somers DO;  Location: [facility] ENDOSCOPY;  Service: Gastroenterology   • CYSTOSCOPY, RETROGRADE PYELOGRAM AND STENT INSERTION Left 3/8/2019    Procedure: CYSTOSCOPY RETROGRADE BILATERAL PYELOGRAM;  Surgeon: Jos Sylvester MD;  Location: [facility] OR;  Service: Urology   • ENDOSCOPY N/A 12/11/2018    Procedure: ESOPHAGOGASTRODUODENOSCOPY WITH ANESTHESIA;  Surgeon: Randy Somers DO;  Location: [facility] ENDOSCOPY;  Service: Gastroenterology   • ENDOSCOPY N/A 4/29/2019    Procedure: ESOPHAGOGASTRODUODENOSCOPY WITH ANESTHESIA;  Surgeon: Lilliam Jj MD;  Location: [facility]  OR;  Service: Gastroenterology   • EYE SURGERY Left 1964   • FOOT SURGERY Right 1966    joint   • FRACTURE SURGERY          Social History:   Social History     Tobacco Use   • Smoking status: Former Smoker     Years: 49.00     Types: Cigarettes     Last attempt to quit: 10/29/2003     Years since quitting: 15.5   • Smokeless tobacco: Former User     Types: Chew     Quit date: 1970   Substance Use Topics   • Alcohol use: No        Family History:  History reviewed. No pertinent family history.    Home Meds:  Medications Prior to Admission   Medication Sig Dispense Refill Last Dose   • amLODIPine (NORVASC) 5 MG tablet Take 1 tablet by mouth Daily. 30 tablet 2 Taking   • folic acid (FOLVITE) 1 MG tablet Take 1 mg by mouth Daily.   Taking   • megestrol (MEGACE) 40 MG/ML suspension Take 800 mg by mouth Daily.   Taking   • melatonin 5 MG tablet tablet Take 5 mg by mouth At Night As Needed (sleep).   Taking   • Multiple Vitamins-Minerals (MULTIVITAMIN ADULT PO) Take 1 tablet by mouth Daily.   Taking   • pantoprazole (PROTONIX) 40 MG EC tablet Take 1 tablet by mouth Daily. 30 tablet 1 Taking   • sodium bicarbonate 650 MG tablet Take 1 tablet by mouth 2 (Two) Times a Day. 60 tablet 0 Taking   • sucralfate (CARAFATE) 1 GM/10ML suspension Take 20 mL by mouth 2 (Two) Times a Day. 120 each 1 Taking   • tamsulosin (FLOMAX) 0.4 MG capsule 24 hr capsule Take 1 capsule by mouth Every Night.   Taking   • zolpidem (AMBIEN) 5 MG tablet Take 5 mg by mouth At Night As Needed for Sleep.   Taking       Current Meds:  Current Facility-Administered Medications   Medication Dose Route Frequency Provider Last Rate Last Dose   • iron sucrose (VENOFER) 200 mg in sodium chloride 0.9 % 100 mL IVPB  200 mg Intravenous Q24H Kenneth Prater  mL/hr at 05/08/19 2332 200 mg at 05/08/19 2332   • pantoprazole (PROTONIX) 40 mg/100 mL (0.4 mg/mL) in 0.9% NS IVPB  8 mg/hr Intravenous Continuous Sourav Escobedo DO 20 mL/hr at 05/09/19 0524 8  mg/hr at 05/09/19 0524   • sodium bicarbonate tablet 650 mg  650 mg Oral BID Kenneth Prater MD       • sodium chloride 0.9 % infusion  50 mL/hr Intravenous Continuous Kenneth Prater MD 50 mL/hr at 05/08/19 2332     • sucralfate (CARAFATE) 1 GM/10ML suspension 1 g  1 g Oral Q6H Kenneth Prater MD   1 g at 05/09/19 0011   • tamsulosin (FLOMAX) 24 hr capsule 0.4 mg  0.4 mg Oral Nightly Kenneth Prater MD       • zolpidem (AMBIEN) tablet 5 mg  5 mg Oral Nightly PRN Kenneth Prater MD         Facility-Administered Medications Ordered in Other Encounters   Medication Dose Route Frequency Provider Last Rate Last Dose   • lidocaine (XYLOCAINE) 2% injection    PRN Bell Velez CRNA   100 mg at 05/09/19 1132       Allergies:  Allergies   Allergen Reactions   • Penicillins Hives       Review of Systems   Review of Systems   Constitutional: Positive for fatigue. Negative for appetite change, chills, fever and unexpected weight change.   HENT: Negative for sore throat and trouble swallowing.    Eyes:        Left eye blindness.    Respiratory: Negative for cough, chest tightness, shortness of breath and wheezing.    Cardiovascular: Negative for chest pain and palpitations.   Gastrointestinal: Positive for blood in stool. Negative for abdominal distention, abdominal pain, constipation, diarrhea, nausea and vomiting.        As mentioned in hpi   Genitourinary: Negative for difficulty urinating and hematuria.   Musculoskeletal: Negative for arthralgias and back pain.   Skin: Negative for color change and rash.   Neurological: Positive for weakness. Negative for dizziness, seizures, syncope, light-headedness and headaches.   Hematological: Negative for adenopathy.   Psychiatric/Behavioral: Negative for confusion. The patient is not nervous/anxious.         Objective     Vital Signs  Temp:  [97 °F (36.1 °C)-97.9 °F (36.6 °C)] 97.8 °F (36.6 °C)  Heart Rate:  [] 72  Resp:  [15-24] 18  BP:  (100-151)/(62-87) 149/64    Physical Exam:   Physical Exam   Constitutional: He appears well-developed and well-nourished. No distress.   HENT:   Head: Normocephalic and atraumatic.   Eyes: EOM are normal. No scleral icterus.   Neck: Neck supple. No JVD present.   Cardiovascular: Normal rate, regular rhythm and normal heart sounds.   Pulmonary/Chest: Effort normal and breath sounds normal.   Abdominal: Soft. Bowel sounds are normal. He exhibits no distension. There is no tenderness.   Musculoskeletal: Normal range of motion. He exhibits no deformity.   Neurological: He is alert.   Skin: Skin is warm and dry. No rash noted.   Psychiatric: He has a normal mood and affect. His behavior is normal.   Vitals reviewed.      Results Review:   I reviewed the patient's new clinical results.  I reviewed the patient's new imaging results and agree with the interpretation.  I reviewed the patient's other test results and agree with the interpretation    Lab Results (most recent)     Procedure Component Value Units Date/Time    CBC & Differential [[id number]] Collected:  05/09/19 0739    Specimen:  Blood Updated:  05/09/19 0749    Narrative:       The following orders were created for panel order CBC & Differential.  Procedure                               Abnormality         Status                     ---------                               -----------         ------                     CBC Auto Differential[[id number]]        Abnormal            Final result                 Please view results for these tests on the individual orders.    CBC Auto Differential [[id number]]  (Abnormal) Collected:  05/09/19 0739    Specimen:  Blood Updated:  05/09/19 0749     WBC 3.91 10*3/mm3      RBC 3.18 10*6/mm3      Hemoglobin 8.6 g/dL      Hematocrit 25.1 %      MCV 78.9 fL      MCH 27.0 pg      MCHC 34.3 g/dL      RDW 14.9 %      RDW-SD 43.2 fl      MPV 10.0 fL      Platelets 77 10*3/mm3      Neutrophil % 83.7 %      Lymphocyte % 7.9 %       Monocyte % 6.1 %      Eosinophil % 0.8 %      Basophil % 0.0 %      Immature Grans % 1.5 %      Neutrophils, Absolute 3.27 10*3/mm3      Lymphocytes, Absolute 0.31 10*3/mm3      Monocytes, Absolute 0.24 10*3/mm3      Eosinophils, Absolute 0.03 10*3/mm3      Basophils, Absolute 0.00 10*3/mm3      Immature Grans, Absolute 0.06 10*3/mm3      nRBC 0.0 /100 WBC     CBC & Differential [[id number]] Collected:  05/09/19 0027    Specimen:  Blood Updated:  05/09/19 0711    Narrative:       The following orders were created for panel order CBC & Differential.  Procedure                               Abnormality         Status                     ---------                               -----------         ------                     CBC Auto Differential[[id number]]        Abnormal            Final result                 Please view results for these tests on the individual orders.    CBC Auto Differential [[id number]]  (Abnormal) Collected:  05/09/19 0027    Specimen:  Blood Updated:  05/09/19 0711     WBC 2.80 10*3/mm3      RBC 3.23 10*6/mm3      Hemoglobin 8.4 g/dL      Hematocrit 25.8 %      MCV 79.9 fL      MCH 26.0 pg      MCHC 32.6 g/dL      RDW 14.9 %      RDW-SD 43.8 fl      MPV 11.3 fL      Platelets 77 10*3/mm3      Neutrophil % 81.8 %      Lymphocyte % 11.8 %      Monocyte % 5.0 %      Eosinophil % 0.0 %      Basophil % 0.0 %      Immature Grans % 1.4 %      Neutrophils, Absolute 2.29 10*3/mm3      Lymphocytes, Absolute 0.33 10*3/mm3      Monocytes, Absolute 0.14 10*3/mm3      Eosinophils, Absolute 0.00 10*3/mm3      Basophils, Absolute 0.00 10*3/mm3      Immature Grans, Absolute 0.04 10*3/mm3      nRBC 0.0 /100 WBC     Hemoglobin & Hematocrit, Blood [[id number]]  (Abnormal) Collected:  05/09/19 0014    Specimen:  Blood Updated:  05/09/19 0025     Hemoglobin 8.3 g/dL      Hematocrit 25.2 %     Urinalysis With Microscopic If Indicated (No Culture) - Urine, Clean Catch [[id number]]  (Abnormal) Collected:  05/08/19 1610     Specimen:  Urine, Clean Catch Updated:  05/08/19 1646     Color, UA Yellow     Appearance, UA Clear     pH, UA 7.5     Specific Gravity, UA 1.012     Glucose, UA Negative     Ketones, UA Negative     Bilirubin, UA Negative     Blood, UA Negative     Protein, UA Trace     Leuk Esterase, UA Negative     Nitrite, UA Negative     Urobilinogen, UA 0.2 E.U./dL    Narrative:       Urine microscopic not indicated.    Manual Differential [[id number]]  (Abnormal) Collected:  05/08/19 1525    Specimen:  Blood from Arm, Right Updated:  05/08/19 1626     Neutrophil % 98.0 %      Lymphocyte % 2.0 %      Neutrophils Absolute 2.65 10*3/mm3      Lymphocytes Absolute 0.05 10*3/mm3      Hypochromia Slight/1+     Microcytes Mod/2+     Poikilocytes Slight/1+     WBC Morphology Normal     Platelet Estimate Decreased    POC Occult Blood Stool [[id number]]  (Abnormal) Collected:  05/08/19 1618    Specimen:  Stool Updated:  05/08/19 1619     Fecal Occult Blood Positive     Comment: specimen obtained by dr rhodes         Lot Number [id number]     Expiration Date 01-31-20     DEVELOPER LOT NUMBER [id number]     DEVELOPER EXPIRATION DATE 01-31-20     Positive Control Positive     Negative Control Negative    Comprehensive Metabolic Panel [[id number]]  (Abnormal) Collected:  05/08/19 1525    Specimen:  Blood from Arm, Right Updated:  05/08/19 1601     Glucose 138 mg/dL      BUN 43 mg/dL      Creatinine 1.43 mg/dL      Sodium 134 mmol/L      Potassium 4.6 mmol/L      Chloride 102 mmol/L      CO2 25.0 mmol/L      Calcium 9.1 mg/dL      Total Protein 6.3 g/dL      Albumin 2.90 g/dL      ALT (SGPT) 77 U/L      AST (SGOT) 35 U/L      Alkaline Phosphatase 85 U/L      Total Bilirubin 0.3 mg/dL      eGFR Non African Amer 48 mL/min/1.73      Globulin 3.4 gm/dL      A/G Ratio 0.9 g/dL      BUN/Creatinine Ratio 30.1     Anion Gap 7.0 mmol/L     Narrative:       GFR Normal >60  Chronic Kidney Disease <60  Kidney Failure <15    Lipase [[id number]]  (Abnormal) Collected:   05/08/19 1525    Specimen:  Blood from Arm, Right Updated:  05/08/19 1559     Lipase 19 U/L     Lactic Acid, Plasma [[id number]]  (Normal) Collected:  05/08/19 1525    Specimen:  Blood from Arm, Right Updated:  05/08/19 1548     Lactate 1.3 mmol/L     Protime-INR [[id number]]  (Normal) Collected:  05/08/19 1525    Specimen:  Blood from Arm, Right Updated:  05/08/19 1548     Protime 12.9 Seconds      INR 0.94    aPTT [[id number]]  (Abnormal) Collected:  05/08/19 1525    Specimen:  Blood from Arm, Right Updated:  05/08/19 1548     PTT 36.0 seconds           Radiology:  Imaging Results (last 24 hours)     ** No results found for the last 24 hours. **            Assessment/Plan       Gastrointestinal hemorrhage    1.  Melena  2.  Bright red blood per rectum  3.  Recent duodenal ulcers noted on upper endoscopy  4.  Duodenal/pancreatic mass  5.  Anemia, acute on chronic  6.  Lung cancer followed by Dr. Michele.     He has had no further bleeding.  He is hemodynamically stable.  Recommend continue Protonix.  He has known duodenal ulcers noted on recent upper endoscopy.  Question if bleeding coming from duodenal ulcers.  Plan for repeat upper endoscopy today.  Of note EGD done April 29, 2019 by Dr. Jj was done in the OR with a defibrillator patch due to recent episode of V. tach when underwent sedation for an EUS requiring intubation and ACLS.  We will continue to monitor hemoglobin and transfuse as needed.    I discussed the patients findings and my recommendations with patient and nursing staff      EMR Dragon/transcription disclaimer:  Much of this encounter note is electronic transcription/translation of spoken language to printed text.  The electronic translation of spoken language may be erroneous, or at times, nonsensical words or phrases may be inadvertently transcribed.  Although I have reviewed the note for such errors, some may still exist.    Susana SALAS 10:25 AM    · I have seen the patient personally with  evaluation and plan of care reviewed and developed with APRN and nursing staff. Where indicated, I have addended and/or modified the above history of present illness, physical examination, and assessment and plan to reflect my findings and impressions. Essential elements of the care plan were discussed with APRN above.  Agree with findings and assessment/plan as documented above.        Pilo Bansal MD  05/09/19  11:32 AM

## 2019-05-09 NOTE — PLAN OF CARE
Problem: Anxiety (Adult)  Goal: Reduction/Resolution  Outcome: Ongoing (interventions implemented as appropriate)   05/09/19 0445   Anxiety (Adult)   Reduction/Resolution making progress toward outcome

## 2019-05-09 NOTE — PROGRESS NOTES
Discharge Planning Assessment  [facility]     Patient Name: Karoline Prater  MRN: [id number]  Today's Date: 5/9/2019    Admit Date: 5/8/2019    Discharge Needs Assessment     Row Name 05/09/19 1400       Living Environment    Lives With  alone    Current Living Arrangements  home/apartment/condo    Primary Care Provided by  self    Provides Primary Care For  no one    Family Caregiver if Needed  sibling(s)    Quality of Family Relationships  supportive;helpful;involved    Able to Return to Prior Arrangements  yes       Resource/Environmental Concerns    Resource/Environmental Concerns  none    Transportation Concerns  car, none       Transition Planning    Patient/Family Anticipates Transition to  home with help/services    Patient/Family Anticipated Services at Transition  home health care    Transportation Anticipated  family or friend will provide       Discharge Needs Assessment    Readmission Within the Last 30 Days  other (see comments)    Concerns to be Addressed  discharge planning    Equipment Currently Used at Home  rollator    Anticipated Changes Related to Illness  none    Equipment Needed After Discharge  none    Outpatient/Agency/Support Group Needs  homecare agency    Discharge Facility/Level of Care Needs  home with home health    Current Discharge Risk  chronically ill;lives alone    Discharge Coordination/Progress  SW spoke to patient regarding discharge plan/needs.  Patient resides alone.  Patient states he doesn't drive but his brother and his friends take him where he needs to go.  Patient states he would like  upon discharge.  Patient has a PCP and RX coverage.  Patient denies any further discharge needs.        Discharge Plan     Row Name 05/09/19 1141       Plan    Plan  Patient currently in endo.  Will need to screen at another time.        Destination      No service coordination in this encounter.      Durable Medical Equipment      No service coordination in this encounter.       Dialysis/Infusion      No service coordination in this encounter.      Home Medical Care      No service coordination in this encounter.      Therapy      No service coordination in this encounter.      Community Resources      No service coordination in this encounter.          Demographic Summary    No documentation.       Functional Status    No documentation.       Psychosocial    No documentation.       Abuse/Neglect    No documentation.       Legal    No documentation.       Substance Abuse    No documentation.       Patient Forms    No documentation.           ANDI Quintana

## 2019-05-09 NOTE — PLAN OF CARE
Problem: Fall Risk (Adult)  Intervention: Monitor/Assist with Self Care   05/09/19 0442   Activity   Activity Assistance Provided assistance, 1 person   Daily Care Interventions   Self-Care Promotion independence encouraged       Goal: Absence of Fall  Outcome: Ongoing (interventions implemented as appropriate)   05/09/19 0442   Fall Risk (Adult)   Absence of Fall making progress toward outcome

## 2019-05-09 NOTE — PLAN OF CARE
Problem: Gastrointestinal Bleeding (Adult)  Intervention: Monitor/Manage Gastrointestinal Bleed Characteristics/Effects   05/09/19 0443   Safety Management   Medication Review/Management high risk medications identified;medications reviewed       Goal: Signs and Symptoms of Listed Potential Problems Will be Absent, Minimized or Managed (Gastrointestinal Bleeding)  Outcome: Ongoing (interventions implemented as appropriate)   05/09/19 0443   Goal/Outcome Evaluation   Problems Assessed (GI Bleeding) hemorrhage   Problems Present (GI Bleeding) hemorrhage

## 2019-05-09 NOTE — ANESTHESIA PREPROCEDURE EVALUATION
Anesthesia Evaluation     Patient summary reviewed   no history of anesthetic complications:  NPO Solid Status: > 8 hours  NPO Liquid Status: > 8 hours           Airway   Mallampati: I  TM distance: >3 FB  Neck ROM: full  Dental          Pulmonary - normal exam    breath sounds clear to auscultation  (+) a smoker (quit 2003) Former, COPD mild,   (-) asthma, recent URI, sleep apnea    ROS comment: RUL Mass  Cardiovascular - normal exam  Exercise tolerance: good (4-7 METS)    ECG reviewed  Patient on routine beta blocker and Beta blocker given within 24 hours of surgery  Rhythm: regular  Rate: normal    (+) hypertension well controlled,   (-) pacemaker, past MI, angina, cardiac stents, CABG    ROS comment: History of code after induction for colonoscopy at [facility] this year, V-tach.  No issues with previous endoscopy.  Cath performed shortly thereafter, and no etiology for V-tach found.    Neuro/Psych  (-) seizures, TIA, CVA  GI/Hepatic/Renal/Endo    (+)  GERD well controlled,    (-) liver disease, no renal disease, diabetes, hypothyroidism, hyperthyroidism    Musculoskeletal     Abdominal    Substance History      OB/GYN          Other        ROS/Med Hx Other: Blind in left eye, due to injury at age 8 yrs                  Anesthesia Plan    ASA 3     MAC   (Patient admitted with anemia.  Previous code during endoscopy at outside facility.  )  intravenous induction   Anesthetic plan, all risks, benefits, and alternatives have been provided, discussed and informed consent has been obtained with: patient.

## 2019-05-09 NOTE — PLAN OF CARE
Problem: Anxiety (Adult)  Goal: Identify Related Risk Factors and Signs and Symptoms   05/09/19 0444   Anxiety (Adult)   Related Risk Factors (Anxiety) coping ineffective;health status change;psychosocial factor   Signs and Symptoms (Anxiety) uncertainty/wariness/doubt

## 2019-05-09 NOTE — ANESTHESIA POSTPROCEDURE EVALUATION
"Patient: Karoline Prater    Procedure Summary     Date:  05/09/19 Room / Location:  [facility] ENDOSCOPY 2 / [facility] ENDOSCOPY    Anesthesia Start:  1126 Anesthesia Stop:  1138    Procedure:  ESOPHAGOGASTRODUODENOSCOPY WITH ANESTHESIA (N/A ) Diagnosis:       Gastrointestinal hemorrhage, unspecified gastrointestinal hemorrhage type      (Gastrointestinal hemorrhage, unspecified gastrointestinal hemorrhage type [K92.2])    Surgeon:  Pilo Bansal MD Provider:  Bell Velez CRNA    Anesthesia Type:  MAC ASA Status:  3          Anesthesia Type: MAC  Last vitals  BP   149/64 (05/09/19 1030)   Temp   97.8 °F (36.6 °C) (05/09/19 0400)   Pulse   72 (05/09/19 1030)   Resp   18 (05/09/19 0400)     SpO2   95 % (05/09/19 1030)     Post Anesthesia Care and Evaluation    Patient location during evaluation: PACU  Patient participation: complete - patient participated  Level of consciousness: awake and alert  Pain management: adequate  Airway patency: patent  Anesthetic complications: No anesthetic complications    Cardiovascular status: acceptable  Respiratory status: acceptable  Hydration status: acceptable    Comments: Blood pressure 149/64, pulse 72, temperature 97.8 °F (36.6 °C), temperature source Oral, resp. rate 18, height 162.6 cm (64.02\"), weight 62.7 kg (138 lb 3.7 oz), SpO2 95 %.    Pt discharged from PACU based on cyn score >8      "

## 2019-05-09 NOTE — PLAN OF CARE
Problem: Fall Risk (Adult)  Goal: Identify Related Risk Factors and Signs and Symptoms  Outcome: Ongoing (interventions implemented as appropriate)   05/09/19 5571   Fall Risk (Adult)   Related Risk Factors (Fall Risk) age-related changes;history of falls;depression/anxiety;sensory deficits;slippery/uneven surfaces   Signs and Symptoms (Fall Risk) presence of risk factors

## 2019-05-09 NOTE — PROGRESS NOTES
Continued Stay Note  [facility]     Patient Name: Karoline Prater  MRN: [id number]  Today's Date: 5/9/2019    Admit Date: 5/8/2019    Discharge Plan     Row Name 05/09/19 1141       Plan    Plan  Patient currently in endo.  Will need to screen at another time.        Discharge Codes    No documentation.             NEREYDA QuintanaW

## 2019-05-09 NOTE — PLAN OF CARE
Problem: Patient Care Overview  Goal: Plan of Care Review  Outcome: Ongoing (interventions implemented as appropriate)   05/09/19 1747   Coping/Psychosocial   Plan of Care Reviewed With patient   Plan of Care Review   Progress no change   Pt was from the ICU. Denies any pain. IID. No signs or symptoms of bleeding. Continue to monitor.  Goal: Individualization and Mutuality  Outcome: Ongoing (interventions implemented as appropriate)   05/09/19 1747   Mutuality/Individual Preferences   What Information Would Help Us Give You More Personalized Care? Pt is blind in his left eye.      Goal: Discharge Needs Assessment  Outcome: Ongoing (interventions implemented as appropriate)      Problem: Skin Injury Risk (Adult)  Goal: Identify Related Risk Factors and Signs and Symptoms  Outcome: Ongoing (interventions implemented as appropriate)   05/09/19 1747   Skin Injury Risk (Adult)   Related Risk Factors (Skin Injury Risk) advanced age;mobility impaired;medication     Goal: Skin Health and Integrity  Outcome: Ongoing (interventions implemented as appropriate)   05/09/19 1747   Skin Injury Risk (Adult)   Skin Health and Integrity making progress toward outcome       Problem: Gastrointestinal Bleeding (Adult)  Goal: Signs and Symptoms of Listed Potential Problems Will be Absent, Minimized or Managed (Gastrointestinal Bleeding)  Outcome: Ongoing (interventions implemented as appropriate)   05/09/19 1747   Goal/Outcome Evaluation   Problems Assessed (GI Bleeding) all   Problems Present (GI Bleeding) hemorrhage;situational response

## 2019-05-09 NOTE — PLAN OF CARE
Problem: Patient Care Overview  Goal: Plan of Care Review   05/09/19 9883   Coping/Psychosocial   Plan of Care Reviewed With patient   Plan of Care Review   Progress improving   OTHER   Outcome Summary Patient admitted with complaints of black tarry stools for the last month, but as of today he noticed bright red blood in his stool. HGB was 7.1 with a HCT of 21.1 and platelet count of 24,000. Patient maintained on protonix drip at a rate of 8mg and NS at a rate of 50 ml/hr. Patient verbalizes anxiety and uncertainty related to his current health status, consult was placed for information to be given on Advanced Directives

## 2019-05-09 NOTE — CONSULTS
MEDICAL ONCOLOGY CONSULTATION    Pt Name: Karoline Prater  MRN: [id number]  [id number]  YOB: 1940  Date of evaluation: 5/9/2019   ICU 7    Reason for Consultation:  Continuity of care    Requesting Physician:  Dr. 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  He was initiated cycle #1 of Carboplatinum, Alimta and Keytruda on 4/18/19.     He was recently hospitalized to [facility] with significant melena and cytopenias from chemotherapy.  Endoscopy performed on 4/29/2019 by Dr. Jj revealed LA grade (1 or more mucosal breaks greater than 5 mm, not extending between the tops of the 2 mucosal folds) esophagitis with no bleeding found in the distal esophagus, Stomach was normal.  Cardia and gastric fundus were normal on retroflexion. One nonbleeding cratered duodenal ulcer with no stigmata of bleeding was found in the duodenal bulb lesion was about 9 mm.  Many nonbleeding cratered, linear and superficial duodenal ulcers with no stigmata of bleeding were found in the second portion of the duodenum.  The largest lesion was 6 mm in largest dimension.  No mass encountered and anastomosis not seen.    He was discharged home on 4/30/2019 and was having normal-appearing brown bowel movements at that time.  Yesterday he was constipated and subsequently had a bowel movement that he reports was black and tarry with some bright red blood attached.    He presented to the emergency department again after he developed constipation yesterday and subsequently had a black tarry bowel movement with associated bright red blood.       TUMOR HISTORY: Adenocarcinoma on the RUL of the lung 11/27/2018  Karoline had CT scans performed for new onset of weight loss of 13 pounds over the previous year, associated with  increased fatigue.   He denied respiratory symptoms of shortness of air, cough or productive sputum.  A CT scan of the chest on 9/12/2018 had been ordered by Dr. Irvign Alexis due to weight loss and identified a new lobulated right upper lobe 5.7 cm mass that extended back to the right hilum.   Numerous mediastinal lymph nodes ranging in size from mm to 1.3 cm and a subcarnial lymph node that measured 1.5 x 1.5 x 3.5 cm.  A CT scan of the abdomen and pelvis with contrast on 9/12/2018 documented a 4.9 x 4 x 4 cm soft tissue mass with peripheral calcification immediately adjacent to the gallbladder in the head of the pancreas area.  An MRI of the abdomen and pelvis W & WO on 10/11/2018 identified in the 4.1 x 3.4 cm soft tissue mass in the subhepatic space, abutting the second portion of the duodenum with mild displacement of the duodenum. There does not appear to be involvement of the pancreas, and the pancreas appears within normal limits.  Differential diagnoses include a desmoid tumor, less likely leiomyoma of the wall of the duodenum or malignancy.   Dr. Michele requested a CT-guided biopsy of the right upper lobe mass.   Dr. Rosales, the radiologist, evaluated the area with a repeat CT scan of the chest on 10/11/2018.   He spoke with Dr. Michele indicating that he would not be able to perform the biopsy because the tumor was not accessible, it was under the scapula , which blocked access and therefore the biopsy procedure was canceled.  On the CT scan of the chest on 10/11/2018 measurements of the RUL lung mass was 5.7 x 3.2 cm with irregular margins suspicious for a primary lung neoplasm. Soft tissue associated with the tumor appeared to extend into the bronchus supplying this portion of the RUL of the lung.   Dr. Rosales indicated that the mass had an endobronchial component and should be easily assessable via bronchoscopy.  The chest CT also included a portion of the upper abdomen where a soft tissue mass  was described in an addendum report. In the subhepatic space measuring 4.0 x 3.9 cm, displacing the second portion of the duodenum medially.  A referral was made to Dr. Becerra for consideration for bronchoscopy for biopsy.  On 10/24/2018, Dr. Gareth Becerra performed a bronchoscopy with EBUS guided biopsy of a 3 cm subcarinal lymph node along with bronchoalveolar lavage of the posterior segment of the RUL of the lung.  The final pathology of the station 7 lymph node only revealed a background of small mature lymphocytes with clusters of histiocytes suggestive of granuloma.  Negative for malignant cells.  Kinyoun and GMS stains were negative for AFB and fungal organisms respectively.  The bronchial washings were negative for malignant cells as well.   Mr. Prater was referred to Dr. Meenakshi Polanco at [facility] [place], [place], for navigational bronchoscopy and biopsy under ultrasound.  On 11/27/2018 Dr. Meenakshi Polanco performed a bronchoscopy, navigational protocol, endobronchial ultrasound and biopsy of the RUL of the lung mass along with multiple lymph node station Biopsies.  Pathology revealed the following:  · Poorly differentiated Adenocarcinoma from the RUL of the lung mass on biopsy and bronchoalveolar lavage consistent with a lung primary.   · All lymph nodes sampled were NEGATIVE for malignant cells including stations 10L, 4L, station 7, 10R, 4R.   · IHC studies were positive for CK7, TTF-1 and Napsin A.   · IHC studies on tumor cells were negative for CDX2, CK5/6, and p63   · Further lung profile molecular testing is pending  All of the above was discussed at length with Mr. Prater at his 12/13/2018 clinic visit.   He was agreeable for referral for consideration of resection of the lung lesion.   He is comfortable with and would like a referral to Dr. Ulysses Bardales ([phone]) at [facility], [address], [place] [id number].  Logistics, however, are  planning a big part in his decision making and he may decide to have surgery done in the [place] area.  If he decides to have surgery in the [place], referral will be made to Dr. Frandy Patel.   CT chest with contrast 2/18/2019 at [facility] compared to 9/12/2018 revealed a 4.9 x 3.5 cm spiculated RUL lung mass which actually decreased in size from a prior value of 6.1 x 3.6 cm.   Subhepatic mass adjacent to the descending duodenum had increased in size significantly to 4.3 x 9 cm compared to 4.1 x 3.4 cm on the 10/11/18 MRI of the abdomen.  CT of the abdomen and pelvis without contrast on 3/20/2019 at [facility] was compared to outpatient CT data and pelvis on 9/12/2018 an MRI of the abdomen from 10/11/2018. A soft tissue mass was again encountered in the subhepatic space which abutted the distal stomach and proximal duodenum measuring 8.9 x 5.4 cm which has increased considerably from a prior measurement of 4.1 x 3.4 cm. Medial to the left renal hilum a 3.5 cm lobular mass causing some mild left-sided hydronephrosis.  Mr. Prater was referred to Dr. Frandy Patel who saw him on 1/22/2019 anticipating plans for a curative resection.   Dr. Michele received a phone call on 2/20/2019 from Dr. Patel , indicating that the previously documented an abdominal pancreatic area mass had doubled in size and was causing compression into the left kidney/renal pelvis producing a hydroureter.   Dr. Patel arranged a referral to Dr. Sylvester who will be placing a stent 3/8/2019.  Mr. Prater was scheduled to be seen by gastroenterology at [facility] on 3/13/2019 for EGD/EUS assessment and biopsy of the enlarging peripancreatic/duodenal area mass.  With a decrease in the lung mass and increased size of the subhepatic mass-surgical resection was abandoned at present pending further evaluation of the subhepatic mass.  #1 TUMOR HISTORY: Pancreatic mass diagnosed 10/29/03  Mr. Prater presented in October 2003 with obstructive jaundice.   A CT scan  of the abdomen on 10/26/2003 documented a 3.2 x 4 x 4.2 cm mass in the head of the pancreas.   On 10/29/03 Dr. Alexis performed a resection of a portion of the head of the pancreas on Fahad Prater along with a Klaudia-en-Y procedure and a choledochojejunostomy for what was felt to be a pancreatic cancer. His pancreas was bypassed because of the findings.  Pathology of the biopsies were negative for malignancy showing a fibrosed pancreas.   Mr. Prater was referred to Dr. Vishal High in [place].  Dr. Vishal High performed ERCP with an EUS and biopsy on 02/26/04   Pathology again was negative for cancer or malignancy.   Routine periodic serologic and radiographic monitoring has not disclosed progression of the mass or development of new malignancy or increase in pancreatic tumor markers.   A CT scan of the abdomen and pelvis with contrast on 9/12/2018 documented a 4.9 x 4 x 4 cm soft tissue mass with peripheral calcification immediately adjacent to the gallbladder in the head of the pancreas area.  An MRI of the abdomen and pelvis W & WO on 10/11/2018 identified in the 4.1 x 3.4 cm soft tissue mass in the subhepatic space, abutting the second portion of the duodenum with mild displacement of the duodenum. There does not appear to be involvement of the pancreas, and the pancreas appears within normal limits.  Differential diagnoses include a desmoid tumor, less likely leiomyoma of the wall of the duodenum or malignancy.   CT of the abdomen and pelvis without contrast on 3/20/2019 at [facility] was compared to outpatient CT data and pelvis on 9/12/2018 an MRI of the abdomen from 10/11/2018. A soft tissue mass was again encountered in the subhepatic space which abutted the distal stomach and proximal duodenum measuring 8.9 x 5.4 cm which has increased considerably from a prior measurement of 4.1 x 3.4 cm. Medial to the left renal hilum a 3.5 cm lobular mass causing some mild left-sided hydronephrosis.  Mr. Prater was scheduled for  an EGD/EUS by Dr. Bryson Moe as an outpatient procedure on 3/13/2019 for assessment and biopsy of the enlarging peripancreatic/duodenal area mass. Unfortunately, after initiation of the procedure, he began having ventricular tachycardia and the procedure had to be aborted. Karoline was transferred to the ICU for cardiology evaluation and stabilization. He remained hospitalized until 3/18/2019 when he was medically cleared for discharge.  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 No emergent urologic intervention was warranted and he received monitoring of renal function. He had a creatinine level of 1.9 at discharge.  PET scan on 4/2/2019 identified a soft tissue mass in the RUL measuring 1.2 x 3.5 cm with extension to the right anterior hilum with an SUV of 10.1. Evidence of mediastinal and hilar lymphadenopathy, with low-level metabolic activity. Interval increase in the size of a large mass in the right upper abdomen inseparable from the duodenum measuring 10.7 x 6.9 cm compared to 5.4 x 8.9 cm on 2/20/2019 with an SUV of 23.6. Slight increase in 2 small inseparable masses medial to the hilum of the left kidney measuring 2.2 and 2.6 cm and the other measuring 3.9 cm with a maximum SUV of 18.6.     #1 HEMATOLOGICAL HISTORY: IgG kappa MGUS- Dx: 02/23/06.  During the course of Mr. Prater’s follow up, an abnormally elevated protein was noted on one occasion, which led to workup including quantitative immunoglobulins.   An elevated IgG kappa was encountered. This has remained stable in the 2500 range since early 2006.   A bone marrow biopsy and aspirate on 02/23/06 was negative with only 4% plasma cells.   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   24 hour urine for immunoelectrophoresis did not reveal an M-spike.  Serology studies on 9/18/2018 documented an elevated, stable IgG of 2589 with an M spike of  0.7.   Immunofixation continued to reveal IgG monoclonal protein with kappa light chain specificity.     #2 HEMATOLOGICAL HISTORY: Iron deficiency anemia, 9/18/2018  Karoline had serology on 9/18/2018 that identified iron deficiency anemia.   His lab work was obtained at [facility].  An iron level was 12, iron saturation 7%, ferritin 256  Folate >20, and vitamin B12 1044.   Dr. Li placed Karoline on ferrous sulfate 325 mg daily on 9/25/2018 , but this failed to resolve the anemia.  An EGD by Dr. Randy Somers on 12/11/2018 documented a normal esophagus, normal stomach and a degree of duodenal deformity.  Gastric biopsy was urease negative.  Colonoscopy by Dr. Randy Somers on 1/9/2019 documented a polyp at 80 cm.  Pathology identified a tubular adenoma, negative for high-grade dysplasia.   Feraheme administered.     Labs on 3/13/2019:  · Iron 25   · Iron saturation 22%   · TIBC 116   · Ferritin >2000   · Reticulocyte count 0.0578   ·    · Haptoglobin 341   · Folate >20   · Vitamin B12 945   · Erythropoietin 13     TREATMENT SUMMARY  1. Feraheme 510 mg IV on 2/14 and 2/21/19    Past Medical History:    Past Medical History:   Diagnosis Date   • Arthritis    • Blindness of left eye    • BPH with obstruction/lower urinary tract symptoms    • Cancer (CMS/HCC)     stomach & lung   • CHF (congestive heart failure) (CMS/HCC)    • GERD (gastroesophageal reflux disease)    • Hearing loss    • History of transfusion    • Hydronephrosis of left kidney    • Hyperlipidemia    • Hypertension     pt was taken off of all of his medications for BP (atenolol, lisinopril, lasix) because his BP kept bottoming out so his primary dr told him to discontinue them 1-2 months ago (Jan/Feb 2019). pt states he takes no medications currently.   • Mass of upper lobe of right lung 02/2019    mass is shrinking on its own, so pt states Dr. Patel is just going to keep an eye on it and not do surgery right now.   • Pancreatic  mass     pt states he had this in 2013 but it went away on its own. Now recent CT shows it has come back so he is going to have an ultrasound on 3/13/19.   • Shortness of breath        Past Surgical History:    Past Surgical History:   Procedure Laterality Date   • ABDOMINAL SURGERY     • BRONCHOSCOPY N/A 10/24/2018    Procedure: BRONCHOSCOPY WITH BIOPSY, EBUS;  Surgeon: Gareth Becerra MD;  Location: [facility] OR;  Service: Pulmonary   • CHOLECYSTECTOMY     • COLONOSCOPY N/A 1/3/2019    Procedure: COLONOSCOPY WITH ANESTHESIA;  Surgeon: Randy Somers DO;  Location: [facility] ENDOSCOPY;  Service: Gastroenterology   • CYSTOSCOPY, RETROGRADE PYELOGRAM AND STENT INSERTION Left 3/8/2019    Procedure: CYSTOSCOPY RETROGRADE BILATERAL PYELOGRAM;  Surgeon: Jos Sylvester MD;  Location: [facility] OR;  Service: Urology   • ENDOSCOPY N/A 12/11/2018    Procedure: ESOPHAGOGASTRODUODENOSCOPY WITH ANESTHESIA;  Surgeon: Randy Somers DO;  Location: [facility] ENDOSCOPY;  Service: Gastroenterology   • ENDOSCOPY N/A 4/29/2019    Procedure: ESOPHAGOGASTRODUODENOSCOPY WITH ANESTHESIA;  Surgeon: Lilliam Jj MD;  Location: [facility] OR;  Service: Gastroenterology   • EYE SURGERY Left 1964   • FOOT SURGERY Right 1966    joint   • FRACTURE SURGERY         Immunizations:      There is no immunization history on file for this patient.    Current Hospital Medications:      Current Facility-Administered Medications:   •  iron sucrose (VENOFER) 200 mg in sodium chloride 0.9 % 100 mL IVPB, 200 mg, Intravenous, Q24H, Kenneth Prater MD, Last Rate: 220 mL/hr at 05/08/19 2332, 200 mg at 05/08/19 2332  •  [COMPLETED] pantoprazole (PROTONIX) injection 80 mg, 80 mg, Intravenous, Once, 80 mg at 05/08/19 1536 **AND** pantoprazole (PROTONIX) 40 mg/100 mL (0.4 mg/mL) in 0.9% NS IVPB, 8 mg/hr, Intravenous, Continuous, Sourav Escobedo DO, Last Rate: 20 mL/hr at 05/09/19 0524, 8 mg/hr at 05/09/19 0524  •  sodium bicarbonate tablet 650 mg, 650  mg, Oral, BID, Kenneth Prater MD  •  sodium chloride 0.9 % infusion, 50 mL/hr, Intravenous, Continuous, Kenneth Prater MD, Last Rate: 50 mL/hr at 05/08/19 2332, 50 mL/hr at 05/08/19 2332  •  sucralfate (CARAFATE) 1 GM/10ML suspension 1 g, 1 g, Oral, Q6H, Kenneth Prater MD, 1 g at 05/09/19 0011  •  tamsulosin (FLOMAX) 24 hr capsule 0.4 mg, 0.4 mg, Oral, Nightly, Kenneth Prater MD  •  zolpidem (AMBIEN) tablet 5 mg, 5 mg, Oral, Nightly PRN, Kenneth Prater MD    Allergies:   Allergies   Allergen Reactions   • Penicillins Hives       Social History:    Social History     Socioeconomic History   • Marital status: Single     Spouse name: Not on file   • Number of children: Not on file   • Years of education: Not on file   • Highest education level: Not on file   Tobacco Use   • Smoking status: Former Smoker     Years: 49.00     Types: Cigarettes     Last attempt to quit: 10/29/2003     Years since quitting: 15.5   • Smokeless tobacco: Former User     Types: Chew     Quit date: 1970   Substance and Sexual Activity   • Alcohol use: No   • Drug use: No   • Sexual activity: Defer       Family History:   History reviewed. No pertinent family history.    REVIEW OF SYSTEMS:   History obtained from chart review and the patient  General: positive for  - fatigue   ENT: positive for - left eye blindness  Allergy and Immunology: negative   Hematological and Lymphatic: negative for - weight loss  Respiratory: positive for - exertional shortness of breath  Cardiovascular: positve for -orthostatic symptoms  Gastrointestinal: positive for - melena and hematochezia  Genito-Urinary: negative for - dysuria or hematuria  Musculoskeletal: positive for - generalized weakness  Neurological: negative for - confusion, dizziness or headaches  Dermatological: negative for-  pruritus and rash     Vitals:    /82 (BP Location: Right arm)   Pulse 84   Temp 97.8 °F (36.6 °C) (Oral)   Resp 18   Ht 162.6 cm  "(64\")   Wt 62.7 kg (138 lb 4.8 oz)   SpO2 91%   BMI 23.74 kg/m²     PHYSICAL EXAM:  General Appearance: Alert, cooperative, appears chronically ill  Head: Normocephalic, without obvious abnormality, atraumatic  Eyes: Left eye issues/blindness, chronic  Ears: Ears appear intact with no abnormalities noted, hearing grossly normal  Throat: No oral lesions, no thrush, oral mucosa moist  Neck: No adenopathy, supple, trachea midline, no JVD  Lungs: Clear to auscultation, respirations regular, even and unlabored  Heart: Regular rhythm and normal rate  Abdomen: No tenderness or guarding, + BS  Genitalia: Deferred  Extremities: Moves all extremities well  Skin: No bleeding or rash, port right CW  Lymph nodes: No palpable adenopathy  Neurologic: Grossly intact though not formally tested      CBC  Results from last 7 days   Lab Units 05/09/19  0014 05/08/19  1525 05/02/19  1429   WBC 10*3/mm3  --  2.70* 1.15*   HEMOGLOBIN g/dL 8.3* 7.0* 9.6*   HEMATOCRIT % 25.2* 21.1* 29.3*   PLATELETS 10*3/mm3  --  24* 53*         Lab Results   Component Value Date     (L) 05/08/2019    K 4.6 05/08/2019     05/08/2019    CO2 25.0 05/08/2019    BUN 43 (H) 05/08/2019    CREATININE 1.43 (H) 05/08/2019    GLUCOSE 138 (H) 05/08/2019    CALCIUM 9.1 05/08/2019    BILITOT 0.3 05/08/2019    ALKPHOS 85 05/08/2019    AST 35 05/08/2019    ALT 77 (H) 05/08/2019    AGRATIO 0.9 (L) 05/08/2019    GLOB 3.4 05/08/2019       Lab Results   Component Value Date    INR 0.94 05/08/2019    INR 0.99 04/26/2019    INR 1.07 10/11/2018    PROTIME 12.9 05/08/2019    PROTIME 13.4 04/26/2019    PROTIME 14.2 10/11/2018       Cultures:    No results found for: BLOODCX  No components found for: URINCX    ASSESSMENT/PLAN:  #1.  GI bleed - Melena and hematochezia       Colonoscopy by Dr. Randy Somers on 1/9/2019 documented a polyp at 80 cm.  Pathology identified a tubular adenoma, negative for high-grade dysplasia.     EGD per Dr. Jj on 4/29/2019 revealed "   · LA grade (1 or more mucosal breaks greater than 5 mm, not extending between the tops of the 2 mucosal folds) esophagitis with no bleeding found in the distal esophagus  · Stomach was normal, biopsies for H. pylori taken.  · Cardia and gastric fundus were normal on retroflexion  · One nonbleeding cratered duodenal ulcer with no stigmata of bleeding was found in the duodenal bulb lesion was about 9 mm.  · Many nonbleeding cratered, linear and superficial duodenal ulcers with no stigmata of bleeding were found in the second portion of the duodenum.  The largest lesion was 6 mm in largest dimension.  No mass encountered and anastomosis not seen.       HgB 5/8/19 was 7  He is receiving Protonix drip.  GI to see    HGB 8.4 today 5/9/2019 (s/p 2 units pRBC 5/8/2019)      #2.  Thrombocytopenia from chemotherapy    PT/INR - 12.9/0.94  PTT - 36    PLT 24,000 on 5/8/2019 he received 2 pheresis of platelets.    PLT 77,000 today 5/9/2019 (s/p 2 pheresis plts  5/8/2019)    #3.  Leukopenia r/t chemotherapy  - He did NOT receive Neulasta support  - WBC 2.7, ANC 2/65 on 5/8/19      #4.  Adenocarcinoma of the RUL of the lung     Mr. Prater was referred to Dr. Frandy Patel who saw him on 1/22/2019 anticipating plans for a curative resection.   CT chest with contrast 2/18/2019 at [facility] compared to 9/12/2018 revealed a 4.9 x 3.5 cm spiculated RUL lung mass which actually decreased in size from a prior value of 6.1 x 3.6 cm.   Subhepatic mass adjacent to the descending duodenum had increased in size significantly to 4.3 x 9 cm compared to 4.1 x 3.4 cm on the 10/11/18 MRI of the abdomen.  With a decrease in the lung mass and increased size of the subhepatic mass-surgical resection has abandoned at this time.     Mediport was placed by Dr. Jeff Cervantes 4/15/19 and Mr. Prater initiated cycle #1 of palliative chemotherapy with Carboplatinum, Alimta and Keytruda on 4/18/19.        #5.  Metastatic disease with pancreatic mass that is  inseparable from the duodenal area  Mr. Prater was scheduled for an EGD/EUS by Dr. Bryson Moe as an outpatient procedure on 3/13/2019 for assessment and biopsy of the enlarging peripancreatic/duodenal area mass.   Unfortunately, after initiation of the procedure, he began having ventricular tachycardia and the procedure had to be aborted. Karoline was transferred to the ICU for cardiology evaluation and stabilization.   He remained hospitalized until 3/18/2019 when he was medically cleared for discharge.     PET scan on 4/2/2019 identified a soft tissue mass in the RUL measuring 1.2 x 3.5 cm with extension to the right anterior hilum with an SUV of 10.1. Evidence of mediastinal and hilar lymphadenopathy, with low-level metabolic activity. Interval increase in the size of a large mass in the right upper abdomen inseparable from the duodenum measuring 10.7 x 6.9 cm compared to 5.4 x 8.9 cm on 2/20/2019 with an SUV of 23.6. Slight increase in 2 small inseparable masses medial to the hilum of the left kidney measuring 2.2 and 2.6 cm and the other measuring 3.9 cm with a maximum SUV of 18.6.      Again, Cycle #1 of palliative chemotherapy with Carboplatin, Alimta and Keytruda was initiated 4/18/19     Abdominal/pelvic CT 4/26/19 measured the RUQ mass, within the duodenum to be decreased at 6.8 x 6.2 cm.      #6.   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   crt 1.43 GFR 48 on 5/8/19 - stable    PATRICA Hurtado    05/09/19  7:03 AM      I personally saw and examined this patient, performing a face-to-face diagnostic evaluation with plan of care reviewed and developed with  Jay Thomas PA-C and nursing staff.   I have addended and/or modified the above history of present illness, physical examination, and assessment and plan to reflect my findings and impressions.   Essential elements of the care plan were  discussed with  Jay Thomas PA-C .   Agree with findings and assessment/plan as documented above.   Questions were encouraged, asked and answered to their understanding and satisfaction.    Jeff Michele MD  5/9/2019 6:20 PM

## 2019-05-09 NOTE — PROGRESS NOTES
[facility] Medicine Services  INPATIENT PROGRESS NOTE    Patient Name: Karoline Prater  Date of Admission: 5/8/2019  Today's Date: 05/09/19  Length of Stay: 1  Primary Care Physician: Irving Alexis MD    Subjective   Chief Complaint: Rectal Bleeding  HPI   Doing ok.  No black or bloody stools  No light-headedness or dizziness  No CP or SOA  Hgb better        Review of Systems   Constitutional: Positive for fatigue. Negative for fever.   HENT: Negative for congestion and ear pain.    Eyes: Negative for redness and visual disturbance.   Respiratory: Negative for cough, shortness of breath and wheezing.    Cardiovascular: Negative for chest pain and palpitations.   Gastrointestinal: Negative for abdominal pain, diarrhea, nausea and vomiting.   Endocrine: Negative for cold intolerance and heat intolerance.   Genitourinary: Negative for dysuria and frequency.   Musculoskeletal: Negative for arthralgias and back pain.   Skin: Negative for rash and wound.   Neurological: Positive for weakness. Negative for dizziness and headaches.   Psychiatric/Behavioral: Negative for confusion. The patient is not nervous/anxious.         All pertinent negatives and positives are as above. All other systems have been reviewed and are negative unless otherwise stated.     Objective    Temp:  [97 °F (36.1 °C)-97.9 °F (36.6 °C)] 97.8 °F (36.6 °C)  Heart Rate:  [] 64  Resp:  [15-24] 18  BP: (100-151)/(62-87) 145/87  Physical Exam   Constitutional: He is oriented to person, place, and time. He appears well-developed and well-nourished.   HENT:   Head: Normocephalic and atraumatic.   Right Ear: External ear normal.   Left Ear: External ear normal.   Nose: Nose normal.   Mouth/Throat: Oropharynx is clear and moist.   Eyes: Conjunctivae and EOM are normal. Right eye exhibits no discharge. No scleral icterus.   L eye enucleated    Neck: Normal range of motion. Neck supple. No tracheal  deviation present. No thyromegaly present.   Cardiovascular: Normal rate, regular rhythm, normal heart sounds and intact distal pulses. Exam reveals no gallop and no friction rub.   No murmur heard.  Pulmonary/Chest: Effort normal and breath sounds normal. No stridor. No respiratory distress. He has no wheezes. He has no rales. He exhibits no tenderness.   Abdominal: Soft. Bowel sounds are normal. He exhibits no distension and no mass. There is no tenderness. There is no rebound and no guarding. No hernia.   Musculoskeletal: Normal range of motion. He exhibits no edema or deformity.   Lymphadenopathy:     He has no cervical adenopathy.   Neurological: He is alert and oriented to person, place, and time. He has normal reflexes. He displays normal reflexes. No cranial nerve deficit. He exhibits normal muscle tone. Coordination normal.   Skin: Skin is warm and dry. No rash noted. No erythema. No pallor.   Psychiatric: He has a normal mood and affect. His behavior is normal. Judgment and thought content normal.   Vitals reviewed.        Results Review:  I have reviewed the labs, radiology results, and diagnostic studies.    Laboratory Data:   Results from last 7 days   Lab Units 05/09/19  0739 05/09/19  0027 05/09/19  0014 05/08/19  1525   WBC 10*3/mm3 3.91* 2.80*  --  2.70*   HEMOGLOBIN g/dL 8.6* 8.4* 8.3* 7.0*   HEMATOCRIT % 25.1* 25.8* 25.2* 21.1*   PLATELETS 10*3/mm3 77* 77*  --  24*        Results from last 7 days   Lab Units 05/08/19  1525   SODIUM mmol/L 134*   POTASSIUM mmol/L 4.6   CHLORIDE mmol/L 102   CO2 mmol/L 25.0   BUN mg/dL 43*   CREATININE mg/dL 1.43*   CALCIUM mg/dL 9.1   BILIRUBIN mg/dL 0.3   ALK PHOS U/L 85   ALT (SGPT) U/L 77*   AST (SGOT) U/L 35   GLUCOSE mg/dL 138*       Culture Data:        Radiology Data:   Imaging Results (last 24 hours)     ** No results found for the last 24 hours. **          I have reviewed the patient's current medications.     Assessment/Plan     Active Hospital Problems     Diagnosis   • Gastrointestinal hemorrhage     1.  GI bleed  -Protonix  -plts; prbc's   -trend H&H  -Consult GI     2.  ABLA on Chronic anemia  -Transfuse PRBC's, plts  -consult GI  -protonix     3.  Pancytopenia due to chemotherapy  -consult heme-onc     4.  PUD  -protonix  -carafate  -consult GI    5.  Thrombocytopenia due to chemotherapy  -Transfused plts  -Heme-onc following  -trend     6.  Adenocarcinoma of right lung  -Heme-onc following    7.  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  Left hydronephrosis  -follows with Dr. Sylvester as an outpt  -Flomax    9.  HTN  -Norvasc    10.  GERD  -Protonix  -Carafate    11.  HLD  -Elevated LFT's, will hold off on adding Statin    12.  BPH  -flomax    13.  S/p enucleation  -supportive care    14.  CKD 3  -gentle IVF  -monitor                    Discharge Planning: I expect the patient to be discharged to home In 2-3 days    Kenneth Prater MD   05/09/19   10:34 AM

## 2019-05-10 VITALS
DIASTOLIC BLOOD PRESSURE: 70 MMHG | HEIGHT: 64 IN | SYSTOLIC BLOOD PRESSURE: 106 MMHG | RESPIRATION RATE: 16 BRPM | HEART RATE: 94 BPM | BODY MASS INDEX: 23.6 KG/M2 | TEMPERATURE: 97.8 F | OXYGEN SATURATION: 98 % | WEIGHT: 138.23 LBS

## 2019-05-10 LAB
ABO + RH BLD: NORMAL
BASOPHILS # BLD AUTO: 0.01 10*3/MM3 (ref 0–0.2)
BASOPHILS NFR BLD AUTO: 0.3 % (ref 0–2)
BH BB BLOOD EXPIRATION DATE: NORMAL
BH BB BLOOD TYPE BARCODE: 5100
BH BB BLOOD TYPE BARCODE: 5100
BH BB BLOOD TYPE BARCODE: 6200
BH BB BLOOD TYPE BARCODE: 6200
BH BB DISPENSE STATUS: NORMAL
BH BB PRODUCT CODE: NORMAL
BH BB UNIT NUMBER: NORMAL
DEPRECATED RDW RBC AUTO: 43.4 FL (ref 40–54)
EOSINOPHIL # BLD AUTO: 0.06 10*3/MM3 (ref 0–0.7)
EOSINOPHIL NFR BLD AUTO: 1.8 % (ref 0–4)
ERYTHROCYTE [DISTWIDTH] IN BLOOD BY AUTOMATED COUNT: 15.2 % (ref 12–15)
HCT VFR BLD AUTO: 27.5 % (ref 40–52)
HGB BLD-MCNC: 9.2 G/DL (ref 14–18)
IMM GRANULOCYTES # BLD AUTO: 0.05 10*3/MM3 (ref 0–0.05)
IMM GRANULOCYTES NFR BLD AUTO: 1.5 % (ref 0–5)
LYMPHOCYTES # BLD AUTO: 0.39 10*3/MM3 (ref 0.72–4.86)
LYMPHOCYTES NFR BLD AUTO: 12 % (ref 15–45)
MCH RBC QN AUTO: 26.4 PG (ref 28–32)
MCHC RBC AUTO-ENTMCNC: 33.5 G/DL (ref 33–36)
MCV RBC AUTO: 78.8 FL (ref 82–95)
MONOCYTES # BLD AUTO: 0.19 10*3/MM3 (ref 0.19–1.3)
MONOCYTES NFR BLD AUTO: 5.8 % (ref 4–12)
NEUTROPHILS # BLD AUTO: 2.55 10*3/MM3 (ref 1.87–8.4)
NEUTROPHILS NFR BLD AUTO: 78.6 % (ref 39–78)
NRBC BLD AUTO-RTO: 0 /100 WBC (ref 0–0.2)
PLATELET # BLD AUTO: 72 10*3/MM3 (ref 130–400)
PMV BLD AUTO: 10.8 FL (ref 6–12)
RBC # BLD AUTO: 3.49 10*6/MM3 (ref 4.8–5.9)
UNIT  ABO: NORMAL
UNIT  RH: NORMAL
WBC NRBC COR # BLD: 3.25 10*3/MM3 (ref 4.8–10.8)

## 2019-05-10 PROCEDURE — 85025 COMPLETE CBC W/AUTO DIFF WBC: CPT | Performed by: PHYSICIAN ASSISTANT

## 2019-05-10 PROCEDURE — 99232 SBSQ HOSP IP/OBS MODERATE 35: CPT | Performed by: NURSE PRACTITIONER

## 2019-05-10 PROCEDURE — 94799 UNLISTED PULMONARY SVC/PX: CPT

## 2019-05-10 PROCEDURE — 94760 N-INVAS EAR/PLS OXIMETRY 1: CPT

## 2019-05-10 RX ADMIN — SODIUM CHLORIDE, PRESERVATIVE FREE 3 ML: 5 INJECTION INTRAVENOUS at 08:57

## 2019-05-10 RX ADMIN — SUCRALFATE 1 G: 1 SUSPENSION ORAL at 06:16

## 2019-05-10 RX ADMIN — SODIUM BICARBONATE 650 MG: 650 TABLET ORAL at 08:57

## 2019-05-10 RX ADMIN — PANTOPRAZOLE SODIUM 40 MG: 40 TABLET, DELAYED RELEASE ORAL at 06:16

## 2019-05-10 RX ADMIN — SUCRALFATE 1 G: 1 SUSPENSION ORAL at 12:15

## 2019-05-10 NOTE — PROGRESS NOTES
Continued Stay Note   An[place]     Patient Name: Karoline Prater  MRN: [id number]  Today's Date: 5/10/2019    Admit Date: 5/8/2019    Discharge Plan     Row Name 05/10/19 0945       Plan    Plan  Home    Patient/Family in Agreement with Plan  yes    Plan Comments  SW spoke to patient regarding discharge plan/needs.  Patient resides alone.  Patient states he doesn't drive but his brother and his friends take him where he needs to go.  Patient states he would like HH upon discharge.  Patient has a PCP and RX coverage.  Patient denies any further discharge needs.        Discharge Codes    No documentation.             ANDI Quintana

## 2019-05-10 NOTE — DISCHARGE PLACEMENT REQUEST
"To: [facility]    From: Rosina Cuevas [phone]      Karoline Prater (78 y.o. Male)     Date of Birth Social Security Number Address Home Phone MRN    [id number]  [id number] [phone] [id number]    [employer] Marital Status          Episcopalian Single       Admission Date Admission Type Admitting Provider Attending Provider Department, Room/Bed    5/8/19 Emergency Kenneth Prater MD Moore, Jonathan Scott, MD [facility] 3C, 377/1    Discharge Date Discharge Disposition Discharge Destination         [place] [facility] [place]ending Provider:  Kenneth Prater MD    Allergies:  Penicillins    Isolation:  None   Infection:  None   Code Status:  Prior    Ht:  162.6 cm (64.02\")   Wt:  62.7 kg (138 lb 3.7 oz)    Admission Cmt:  None   Principal Problem:  None                Active Insurance as of 5/8/2019     Primary Coverage     Payor Plan Insurance Group Employer/Plan Group    MEDICARE MEDICARE A & B      Payor Plan Address Payor Plan Phone Number Payor Plan Fax Number Effective Dates    [address] [phone]  6/1/2007 - None Entered    [facility] [id number]       Subscriber Name Subscriber Birth Date Member ID       KAROLINE PRATER 1940 [id number]           Secondary Coverage     Payor Plan Insurance Group Employer/Plan Group    COMBINED INSURANCE CO COMBINED INSURANCE CO      Payor Plan Address Payor Plan Phone Number Payor Plan Fax Number Effective Dates    [address] [phone]  12/1/2013 - None Entered    [facility] [id number]       Subscriber Name Subscriber Birth Date Member ID       KAROLINE PRATER 1940 [id number]                 Emergency Contacts      (Rel.) Home Phone Work Phone Mobile Phone    PraterKeshav (Brother) [phone] -- --    Ulysses Fatima (Friend) -- -- [phone]               History & Physical      Kenneth Prater MD at 5/8/2019  6:52 PM              [facility] Team " [facility] Medicine Services  HISTORY AND PHYSICAL    Date of Admission: 5/8/2019  Primary Care Physician: Irving Alexis MD    Subjective     Chief Complaint: Rectal Bleeding    History of Present Illness  Mr. Prater is a 78 year old gentleman with a history of abdominal malignancy and lung cancer.  He follows with Dr. Michele.  He is currently undergoing chemo and is scheduled to do chemo tomorrow.  Today he was having a bowel movement and had bright red blood come out.  He has had dark, black-tarry stools for several months now.  He denies CP or SOA.  He denies light-headedness or dizziness.      He states he has ulcers in his large intestine.  Review of previous EGD wit Dr. Jj reveals upper GI ulcers.      Review of Systems   Constitutional: Positive for fatigue. Negative for fever.   HENT: Negative for congestion and ear pain.    Eyes: Negative for redness and visual disturbance.   Respiratory: Negative for cough, shortness of breath and wheezing.    Cardiovascular: Negative for chest pain and palpitations.   Gastrointestinal: Positive for anal bleeding and blood in stool. Negative for abdominal pain, diarrhea, nausea and vomiting.   Endocrine: Negative for cold intolerance and heat intolerance.   Genitourinary: Negative for dysuria and frequency.   Musculoskeletal: Negative for arthralgias and back pain.   Skin: Negative for rash and wound.   Neurological: Positive for weakness. Negative for dizziness and headaches.   Psychiatric/Behavioral: Negative for confusion. The patient is not nervous/anxious.         Otherwise complete ROS reviewed and negative except as mentioned in the HPI.    Past Medical History:   Past Medical History:   Diagnosis Date   • Arthritis    • Blindness of left eye    • BPH with obstruction/lower urinary tract symptoms    • Cancer (CMS/HCC)     stomach & lung   • GERD (gastroesophageal reflux disease)    • Hearing loss    • Hydronephrosis of left kidney    • Hypertension      pt was taken off of all of his medications for BP (atenolol, lisinopril, lasix) because his BP kept bottoming out so his primary dr told him to discontinue them 1-2 months ago (Jan/Feb 2019). pt states he takes no medications currently.   • Mass of upper lobe of right lung 02/2019    mass is shrinking on its own, so pt states Dr. Patel is just going to keep an eye on it and not do surgery right now.   • Pancreatic mass     pt states he had this in 2013 but it went away on its own. Now recent CT shows it has come back so he is going to have an ultrasound on 3/13/19.   • Shortness of breath      Past Surgical History:  Past Surgical History:   Procedure Laterality Date   • BRONCHOSCOPY N/A 10/24/2018    Procedure: BRONCHOSCOPY WITH BIOPSY, EBUS;  Surgeon: Gareth Becerra MD;  Location: [facility] OR;  Service: Pulmonary   • CHOLECYSTECTOMY     • COLONOSCOPY N/A 1/3/2019    Procedure: COLONOSCOPY WITH ANESTHESIA;  Surgeon: Randy Somers DO;  Location: [facility] ENDOSCOPY;  Service: Gastroenterology   • CYSTOSCOPY, RETROGRADE PYELOGRAM AND STENT INSERTION Left 3/8/2019    Procedure: CYSTOSCOPY RETROGRADE BILATERAL PYELOGRAM;  Surgeon: Jos Sylvester MD;  Location: [facility] OR;  Service: Urology   • ENDOSCOPY N/A 12/11/2018    Procedure: ESOPHAGOGASTRODUODENOSCOPY WITH ANESTHESIA;  Surgeon: Randy Somers DO;  Location: [facility] ENDOSCOPY;  Service: Gastroenterology   • ENDOSCOPY N/A 4/29/2019    Procedure: ESOPHAGOGASTRODUODENOSCOPY WITH ANESTHESIA;  Surgeon: Lilliam Jj MD;  Location: [facility] OR;  Service: Gastroenterology   • EYE SURGERY Left 1964   • FOOT SURGERY Right 1966    joint     Social History:  reports that he quit smoking about 15 years ago. His smoking use included cigarettes. He quit after 49.00 years of use. He quit smokeless tobacco use about 49 years ago. His smokeless tobacco use included chew. He reports that he does not drink alcohol or use drugs.    Family History:  "HTN    Allergies:  Allergies   Allergen Reactions   • Penicillins Hives     Medications:  Prior to Admission medications    Medication Sig Start Date End Date Taking? Authorizing Provider   amLODIPine (NORVASC) 5 MG tablet Take 1 tablet by mouth Daily. 5/1/19   Hang Reddy DO   folic acid (FOLVITE) 1 MG tablet Take 1 mg by mouth Daily.    Eliecer Schultz MD   megestrol (MEGACE) 40 MG/ML suspension Take 800 mg by mouth Daily.    Eliecer Schultz MD   melatonin 5 MG tablet tablet Take 5 mg by mouth At Night As Needed (sleep).    Eliecer Schultz MD   Multiple Vitamins-Minerals (MULTIVITAMIN ADULT PO) Take 1 tablet by mouth Daily.    Eliecer Schultz MD   pantoprazole (PROTONIX) 40 MG EC tablet Take 1 tablet by mouth Daily. 4/30/19   Hang Reddy DO   sodium bicarbonate 650 MG tablet Take 1 tablet by mouth 2 (Two) Times a Day. 4/30/19   Hang Reddy DO   sucralfate (CARAFATE) 1 GM/10ML suspension Take 20 mL by mouth 2 (Two) Times a Day. 4/30/19   Hang Reddy DO   tamsulosin (FLOMAX) 0.4 MG capsule 24 hr capsule Take 1 capsule by mouth Every Night.    Eliecer Schultz MD   zolpidem (AMBIEN) 5 MG tablet Take 5 mg by mouth At Night As Needed for Sleep.    Eliecer Schultz MD     Objective     Vital Signs: /63   Pulse 85   Temp 97.7 °F (36.5 °C) (Oral)   Resp 19   Ht 162.6 cm (64\")   Wt 64 kg (141 lb)   SpO2 98%   BMI 24.20 kg/m²    Physical Exam   Constitutional: He is oriented to person, place, and time. He appears well-developed and well-nourished.   HENT:   Head: Normocephalic and atraumatic.   Right Ear: External ear normal.   Left Ear: External ear normal.   Nose: Nose normal.   Mouth/Throat: Oropharynx is clear and moist.   Eyes: Conjunctivae and EOM are normal. Right eye exhibits no discharge. No scleral icterus.   L eye s/p enucleation   Neck: Normal range of motion. Neck supple. No tracheal deviation present. No thyromegaly present. "   Cardiovascular: Normal rate, regular rhythm, normal heart sounds and intact distal pulses. Exam reveals no gallop and no friction rub.   No murmur heard.  Pulmonary/Chest: Effort normal and breath sounds normal. No stridor. No respiratory distress. He has no wheezes. He has no rales. He exhibits no tenderness.   Abdominal: Soft. Bowel sounds are normal. He exhibits no distension and no mass. There is no tenderness. There is no rebound and no guarding. No hernia.   Musculoskeletal: Normal range of motion. He exhibits no edema or deformity.   Lymphadenopathy:     He has no cervical adenopathy.   Neurological: He is alert and oriented to person, place, and time. He has normal reflexes. He displays normal reflexes. No cranial nerve deficit. He exhibits normal muscle tone. Coordination normal.   Skin: Skin is warm and dry. No rash noted. No erythema. No pallor.   Psychiatric: He has a normal mood and affect. His behavior is normal. Judgment and thought content normal.   Vitals reviewed.          Results Reviewed:  Lab Results (last 24 hours)     Procedure Component Value Units Date/Time    Urinalysis With Microscopic If Indicated (No Culture) - Urine, Clean Catch [[id number]]  (Abnormal) Collected:  05/08/19 1610    Specimen:  Urine, Clean Catch Updated:  05/08/19 1646     Color, UA Yellow     Appearance, UA Clear     pH, UA 7.5     Specific Gravity, UA 1.012     Glucose, UA Negative     Ketones, UA Negative     Bilirubin, UA Negative     Blood, UA Negative     Protein, UA Trace     Leuk Esterase, UA Negative     Nitrite, UA Negative     Urobilinogen, UA 0.2 E.U./dL    Narrative:       Urine microscopic not indicated.    Manual Differential [[id number]]  (Abnormal) Collected:  05/08/19 1525    Specimen:  Blood from Arm, Right Updated:  05/08/19 1626     Neutrophil % 98.0 %      Lymphocyte % 2.0 %      Neutrophils Absolute 2.65 10*3/mm3      Lymphocytes Absolute 0.05 10*3/mm3      Hypochromia Slight/1+     Microcytes  Mod/2+     Poikilocytes Slight/1+     WBC Morphology Normal     Platelet Estimate Decreased    CBC & Differential [[id number]] Collected:  05/08/19 1525    Specimen:  Blood Updated:  05/08/19 1626    Narrative:       The following orders were created for panel order CBC & Differential.  Procedure                               Abnormality         Status                     ---------                               -----------         ------                     CBC Auto Differential[[id number]]        Abnormal            Final result                 Please view results for these tests on the individual orders.    CBC Auto Differential [[id number]]  (Abnormal) Collected:  05/08/19 1525    Specimen:  Blood from Arm, Right Updated:  05/08/19 1626     WBC 2.70 10*3/mm3      RBC 2.62 10*6/mm3      Hemoglobin 7.0 g/dL      Hematocrit 21.1 %      MCV 80.5 fL      MCH 26.7 pg      MCHC 33.2 g/dL      RDW 14.6 %      RDW-SD 43.7 fl      MPV 11.4 fL      Platelets 24 10*3/mm3     POC Occult Blood Stool [[id number]]  (Abnormal) Collected:  05/08/19 1618    Specimen:  Stool Updated:  05/08/19 1619     Fecal Occult Blood Positive     Comment: specimen obtained by dr rhodes         Lot Number [id number]     Expiration Date 01-31-20     DEVELOPER LOT NUMBER 149     DEVELOPER EXPIRATION DATE 01-31-20     Positive Control Positive     Negative Control Negative    Comprehensive Metabolic Panel [[id number]]  (Abnormal) Collected:  05/08/19 1525    Specimen:  Blood from Arm, Right Updated:  05/08/19 1601     Glucose 138 mg/dL      BUN 43 mg/dL      Creatinine 1.43 mg/dL      Sodium 134 mmol/L      Potassium 4.6 mmol/L      Chloride 102 mmol/L      CO2 25.0 mmol/L      Calcium 9.1 mg/dL      Total Protein 6.3 g/dL      Albumin 2.90 g/dL      ALT (SGPT) 77 U/L      AST (SGOT) 35 U/L      Alkaline Phosphatase 85 U/L      Total Bilirubin 0.3 mg/dL      eGFR Non African Amer 48 mL/min/1.73      Globulin 3.4 gm/dL      A/G Ratio 0.9 g/dL      BUN/Creatinine  Ratio 30.1     Anion Gap 7.0 mmol/L     Narrative:       GFR Normal >60  Chronic Kidney Disease <60  Kidney Failure <15    Lipase [[id number]]  (Abnormal) Collected:  05/08/19 1525    Specimen:  Blood from Arm, Right Updated:  05/08/19 1559     Lipase 19 U/L     Lactic Acid, Plasma [[id number]]  (Normal) Collected:  05/08/19 1525    Specimen:  Blood from Arm, Right Updated:  05/08/19 1548     Lactate 1.3 mmol/L     Protime-INR [[id number]]  (Normal) Collected:  05/08/19 1525    Specimen:  Blood from Arm, Right Updated:  05/08/19 1548     Protime 12.9 Seconds      INR 0.94    aPTT [[id number]]  (Abnormal) Collected:  05/08/19 1525    Specimen:  Blood from Arm, Right Updated:  05/08/19 1548     PTT 36.0 seconds         Imaging Results (last 24 hours)     ** No results found for the last 24 hours. **        I have personally reviewed and interpreted the radiology studies and ECG obtained at time of admission.     Assessment / Plan     Assessment:   Active Hospital Problems    Diagnosis   • Gastrointestinal hemorrhage     1.  GI bleed  -Protonix  -plts; prbc's   -trend H&H  -Consult GI    2.  ABLA on Chronic anemia  -Transfuse PRBC's, plts  -consult GI  -protonix    3.  Pancytopenia  -consult heme-onc    4.  PUD  -protonix  -Carafate  -consult GI    5.  Thrombocytopenia due to chemotherapy  -Transfused plts  -Heme-onc following  -trend     6.  Adenocarcinoma of right lung  -Heme-onc following     7.  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  Left hydronephrosis  -follows with Dr. Sylvester as an outpt  -Flomax     9.  HTN  -Norvasc     10.  GERD  -Protonix  -Carafate     11.  HLD  -Elevated LFT's, will hold off on adding Statin     12.  BPH  -flomax     13.  S/p enucleation  -supportive care    14.  Stage 3 CKD  -gentle hydration  -monitor        Code Status: Full     I discussed the patient's findings and my recommendations with the patient, patient's son    Estimated length of  stay 2-3 days    Kenneth Prater MD   05/08/19   7:08 PM              Electronically signed by Kenneth Prater MD at 5/9/2019 10:48 AM       Hospital Medications (active)       Dose Frequency Start End    buffered lidocaine syringe 0.5 mL 0.5 mL Once As Needed 5/9/2019     Sig - Route: Inject 0.5 mL as directed 1 (One) Time As Needed (IV Start). - Injection    iron sucrose (VENOFER) 200 mg in sodium chloride 0.9 % 100 mL IVPB 200 mg Every 24 Hours 5/8/2019     Sig - Route: Infuse 200 mg into a venous catheter Daily. - Intravenous    pantoprazole (PROTONIX) EC tablet 40 mg 40 mg Every Early Morning 5/10/2019     Sig - Route: Take 1 tablet by mouth Every Morning. - Oral    sodium bicarbonate tablet 650 mg 650 mg 2 Times Daily 5/8/2019     Sig - Route: Take 1 tablet by mouth 2 (Two) Times a Day. - Oral    sodium chloride 0.9 % flush 3 mL 3 mL Every 12 Hours Scheduled 5/9/2019     Sig - Route: Infuse 3 mL into a venous catheter Every 12 (Twelve) Hours. - Intravenous    sodium chloride 0.9 % flush 3-10 mL 3-10 mL As Needed 5/9/2019     Sig - Route: Infuse 3-10 mL into a venous catheter As Needed for Line Care. - Intravenous    sucralfate (CARAFATE) 1 GM/10ML suspension 1 g 1 g Every 6 Hours Scheduled 5/9/2019     Sig - Route: Take 10 mL by mouth Every 6 (Six) Hours. - Oral    tamsulosin (FLOMAX) 24 hr capsule 0.4 mg 0.4 mg Nightly 5/8/2019     Sig - Route: Take 1 capsule by mouth Every Night. - Oral    zolpidem (AMBIEN) tablet 5 mg 5 mg Nightly PRN 5/8/2019     Sig - Route: Take 1 tablet by mouth At Night As Needed for Sleep. - Oral    sodium chloride 0.9 % infusion (Discontinued) 50 mL/hr Continuous 5/8/2019 5/9/2019    Sig - Route: Infuse 50 mL/hr into a venous catheter Continuous. - Intravenous    sodium chloride 0.9 % infusion (Discontinued) 100 mL/hr Continuous 5/9/2019 5/9/2019    Sig - Route: Infuse 100 mL/hr into a venous catheter Continuous. - Intravenous             Physician Progress Notes (most recent  note)      Randy Somers, DO at 5/10/2019  7:40 AM          [facility]  Inpatient Progress Note    Reason for Follow Up: GI bleed    Subjective     Subjective:   Patient lying in bed without any complaints today.  He specifically denies abdominal pain, no nausea no vomiting.  No observation of rectal bleed.  Currently on p.o. PPI and tolerating regular diet.    EGD 4/29/2019 by Dr. Jj showed esophagitis nonbleeding duodenal ulcers  EGD 5/9/2019 by Dr. Bansal showed healing ulcers    Current Facility-Administered Medications:   •  buffered lidocaine syringe 0.5 mL, 0.5 mL, Injection, Once PRN, Wilfredo Marquez MD  •  iron sucrose (VENOFER) 200 mg in sodium chloride 0.9 % 100 mL IVPB, 200 mg, Intravenous, Q24H, Kenneth Prater MD, Stopped at 05/09/19 2236  •  pantoprazole (PROTONIX) EC tablet 40 mg, 40 mg, Oral, Q AM, Pilo Bansal MD, 40 mg at 05/10/19 0616  •  sodium bicarbonate tablet 650 mg, 650 mg, Oral, BID, Kenneth Prater MD, 650 mg at 05/10/19 0857  •  sodium chloride 0.9 % flush 3 mL, 3 mL, Intravenous, Q12H, Wilfredo Marquez MD, 3 mL at 05/10/19 0857  •  sodium chloride 0.9 % flush 3-10 mL, 3-10 mL, Intravenous, PRN, Wilfredo Marquez MD  •  sucralfate (CARAFATE) 1 GM/10ML suspension 1 g, 1 g, Oral, Q6H, Kenneth Prater MD, 1 g at 05/10/19 0616  •  tamsulosin (FLOMAX) 24 hr capsule 0.4 mg, 0.4 mg, Oral, Nightly, Kenneth Prater MD, 0.4 mg at 05/09/19 2020  •  zolpidem (AMBIEN) tablet 5 mg, 5 mg, Oral, Nightly PRN, Kenneth Prater MD, 5 mg at 05/09/19 1851    Review of Systems     Constitution:  negative for chills, fatigue and fevers  Eyes:  negativefor blurriness and change of vision  ENT:   negative for sore throat and voice change  Respiratory: negative for  cough and shortness of air  Cardiovascular:  Negative for chest pain or palpitations  Gastrointestinal:  negative for  See HPI  Genitourinary:  negativefor  blood in  urine and painful urination  Integument: negative for  rash and redness  Hematologic / Lymphatic: negative for  excessive bleeding and easy bruising  Musculoskeletal: negative for  joint pain and joint stiffness out of the ordinary  Neurological:  negative for  seizures and speech abnormal  Behavioral/Psych:  negative for  anxiety and depression out of the ordinary  Endocrine: negative for  diabetes:and weight loss, unintended  Allergies / Immunologic:  negative for  anaphylaxis and rash        Objective     Vital Signs  Temp:  [97.6 °F (36.4 °C)-98.2 °F (36.8 °C)] 98.2 °F (36.8 °C)  Heart Rate:  [64-96] 83  Resp:  [16-18] 16  BP: (131-156)/(63-87) 146/77  Body mass index is 23.71 kg/m².    Intake/Output Summary (Last 24 hours) at 5/10/2019 0910  Last data filed at 5/10/2019 0802  Gross per 24 hour   Intake 300 ml   Output 1825 ml   Net -1525 ml     I/O this shift:  In: -   Out: 225 [Urine:225]       Physical Exam:   General: patient awake, alert and cooperative   Eyes: Normal lids and lashes, no scleral icterus   Neck: supple, normal ROM   Skin: warm and dry, not jaundiced   Cardiovascular: regular rhythm and rate, no murmurs auscultated   Pulm: clear to auscultation bilaterally, regular and unlabored   Abdomen: soft, nontender, nondistended; normal bowel sounds   Rectal: deferred   Extremities: no rash or edema   Psychiatric: Normal mood and behavior; memory intact         Results Review:    I have reviewed all of the patients current test results  Results from last 7 days   Lab Units 05/10/19  0748 05/09/19  1941 05/09/19  1320 05/09/19  0739 05/09/19  0027   WBC 10*3/mm3 3.25*  --   --  3.91* 2.80*   HEMOGLOBIN g/dL 9.2* 9.7* 10.1* 8.6* 8.4*   HEMATOCRIT % 27.5* 29.0* 30.0* 25.1* 25.8*   PLATELETS 10*3/mm3 72*  --   --  77* 77*       Results from last 7 days   Lab Units 05/09/19  0014 05/08/19  1525   SODIUM mmol/L 137 134*   POTASSIUM mmol/L 4.3 4.6   CHLORIDE mmol/L 108 102   CO2 mmol/L 22.0* 25.0   BUN mg/dL  38* 43*   CREATININE mg/dL 1.25 1.43*   CALCIUM mg/dL 8.9 9.1   BILIRUBIN mg/dL  --  0.3   ALK PHOS U/L  --  85   ALT (SGPT) U/L  --  77*   AST (SGOT) U/L  --  35   GLUCOSE mg/dL 114* 138*       Results from last 7 days   Lab Units 05/08/19  1525   INR  0.94       Lab Results   Lab Value Date/Time    LIPASE 19 (L) 05/08/2019 1525       Radiology:    Imaging Results (last 24 hours)     ** No results found for the last 24 hours. **            Assessment/Plan     Patient Active Problem List   Diagnosis Code   • Mass of upper lobe of right lung R91.8   • Mediastinal adenopathy R59.0   • Abdominal pain R10.9   • Body mass index (BMI) of 25.0 to 29.9 E66.3   • Cardiomyopathy, nonischemic (CMS/HCC) I42.8   • Dyspnea R06.00   • Essential hypertension I10   • Hypercalcemia E83.52   • Hypertensive disorder I10   • Monoclonal gammopathy of unknown significance (MGUS) D47.2   • NSVT (nonsustained ventricular tachycardia) (CMS/HCC) I47.2   • Abnormal gastrointestinal PET scan R94.8   • Weight loss R63.4   • Anemia D64.9   • Hydronephrosis N13.30   • Malignant neoplasm of upper lobe of right lung (CMS/HCC) C34.11   • Melena K92.1   • Hyponatremia E87.1   • Acute blood loss anemia D62   • Stage 3 chronic kidney disease (CMS/HCC) N18.3   • Adenocarcinoma, lung (CMS/HCC) C34.90   • Mass of duodenum versus letty hepatis  K31.89   • Mass of left renal hilum  N28.89   • Pancytopenia due to antineoplastic chemotherapy (CMS/HCC) D61.810, T45.1X5A   • Gastrointestinal hemorrhage K92.2       Impression/Plan    Melena  Pancytopenia  Adenocarcinoma of lung/pancreatic mass  Duodenal ulcer  GI bleed    Anemia is stable.  Endoscopy yesterday showed healing ulcers.  Patient has not exhibited any further signs of GI blood loss.  Patient is without any complaints this morning.  Recommend continue PPI therapy.  He is followed by Dr. Michele from oncology aspect okay to discharge from hospital from GI perspective when primary feels it is  appropriate.    Zeke Davison, APRN 8:48 AM      Randy Somers DO  05/10/19  9:10 AM      Electronically signed by Randy Somers DO at 5/10/2019  9:10 AM         Date: 5/10/2019 Department: [address] Ordering/Authorizing: Kenneth Prater MD   Standing Order Information     Remaining Occurrences Interval Last Released      0/1 Once 5/10/2019             Order History   Inpatient   Date/Time Action Taken User Additional Information   05/10/19 1404 Sign Ashley Sabillon RN Ordering Mode: Telephone with readback   05/10/19 1404 Release Instance Ashley Sabillon RN (auto-released) Released Order: [id number]   05/10/19 1404 Acknowledge Ashley Sabillon RN New Order   Order Details     Frequency Duration Priority Order Class   Once 1  occurrence Routine Hospital Performed   Order Questions     Question Answer Comment   Reason for Consult? home health           Source Order Set / Preference List     Preference List   [facility] GENERAL CONSULTS [[id number]]   Verbal Order Info     Action Created on Order Mode Entered by Responsible Provider Signed by Signed on   Ordering 05/10/19 1404 Telephone with readback Ashley Sabillon RN Moore, Jonathan Scott, MD     Consult Order Info     ID Description Priority Start Date Start Time   [id number] Case Management  Consult Routine 05/10/2019  2:05 PM   Provider Specialty Referred to   ______________________________________ _____________________________________   Acknowledgement Info     For At Acknowledged By Acknowledged On   Placing Order 05/10/19 1404 Ashley Sabillon RN 05/10/19 1404   Reprint Order Requisition     Case Management  Consult (Order #[id number]) on 5/10/19   Encounter     View Encounter           Order Provider Info         Office phone Pager E-mail   Ordering User Ashley Sabillon RN -- -- --   Authorizing Provider Kenneth Prater [phone] -- --   Attending Provider Moi  Kenneth Snyder [phone] -- --   Entered By Ashley Sabillon RN -- -- --   Ordering Provider Kenneth Prater [phone] -- --   Order Transmittal Tracking     Case Management  Consult (Order #[id number]) on 5/10/19

## 2019-05-10 NOTE — PROGRESS NOTES
PROGRESS NOTE  Patient name: Karoline Prater  Patient : 1940  VISIT # [id number]  MR #[id number]   Room 377    SUBJECTIVE: He feels okay.  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  He was initiated cycle #1 of Carboplatinum, Alimta and Keytruda on 19.      He was recently hospitalized to [facility] with significant melena and cytopenias from chemotherapy.  Endoscopy performed on 2019 by Dr. Jj revealed LA grade (1 or more mucosal breaks greater than 5 mm, not extending between the tops of the 2 mucosal folds) esophagitis with no bleeding found in the distal esophagus, Stomach was normal.  Cardia and gastric fundus were normal on retroflexion. One nonbleeding cratered duodenal ulcer with no stigmata of bleeding was found in the duodenal bulb lesion was about 9 mm.  Many nonbleeding cratered, linear and superficial duodenal ulcers with no stigmata of bleeding were found in the second portion of the duodenum.  The largest lesion was 6 mm in largest dimension.  No mass encountered and anastomosis not seen.     He was discharged home on 2019 and was having normal-appearing brown bowel movements at that time.  Yesterday he was constipated and subsequently had a bowel movement that he reports was black and tarry with some bright red blood attached.     He presented to the emergency department again after he developed constipation yesterday and subsequently had a black tarry bowel movement with associated bright red blood.        TUMOR HISTORY: Adenocarcinoma on the RUL of the lung 2018  Karoline had CT scans performed for new onset of weight loss of 13 pounds over the previous year, associated with increased fatigue.   He denied respiratory symptoms of shortness of air, cough or productive sputum.  A CT scan of the chest on  9/12/2018 had been ordered by Dr. Irving Alexis due to weight loss and identified a new lobulated right upper lobe 5.7 cm mass that extended back to the right hilum.   Numerous mediastinal lymph nodes ranging in size from mm to 1.3 cm and a subcarnial lymph node that measured 1.5 x 1.5 x 3.5 cm.  A CT scan of the abdomen and pelvis with contrast on 9/12/2018 documented a 4.9 x 4 x 4 cm soft tissue mass with peripheral calcification immediately adjacent to the gallbladder in the head of the pancreas area.  An MRI of the abdomen and pelvis W & WO on 10/11/2018 identified in the 4.1 x 3.4 cm soft tissue mass in the subhepatic space, abutting the second portion of the duodenum with mild displacement of the duodenum. There does not appear to be involvement of the pancreas, and the pancreas appears within normal limits.  Differential diagnoses include a desmoid tumor, less likely leiomyoma of the wall of the duodenum or malignancy.   Dr. Micehle requested a CT-guided biopsy of the right upper lobe mass.   Dr. Rosales, the radiologist, evaluated the area with a repeat CT scan of the chest on 10/11/2018.   He spoke with Dr. Michele indicating that he would not be able to perform the biopsy because the tumor was not accessible, it was under the scapula , which blocked access and therefore the biopsy procedure was canceled.  On the CT scan of the chest on 10/11/2018 measurements of the RUL lung mass was 5.7 x 3.2 cm with irregular margins suspicious for a primary lung neoplasm. Soft tissue associated with the tumor appeared to extend into the bronchus supplying this portion of the RUL of the lung.   Dr. Rosales indicated that the mass had an endobronchial component and should be easily assessable via bronchoscopy.  The chest CT also included a portion of the upper abdomen where a soft tissue mass was described in an addendum report. In the subhepatic space measuring 4.0 x 3.9 cm, displacing the second portion of the  duodenum medially.  A referral was made to Dr. Becerra for consideration for bronchoscopy for biopsy.  On 10/24/2018, Dr. Gareth Becerra performed a bronchoscopy with EBUS guided biopsy of a 3 cm subcarinal lymph node along with bronchoalveolar lavage of the posterior segment of the RUL of the lung.  The final pathology of the station 7 lymph node only revealed a background of small mature lymphocytes with clusters of histiocytes suggestive of granuloma.  Negative for malignant cells.  Kinyoun and GMS stains were negative for AFB and fungal organisms respectively.  The bronchial washings were negative for malignant cells as well.   Mr. Prater was referred to Dr. Meenakshi Polanco at [facility] in [place], [place], for navigational bronchoscopy and biopsy under ultrasound.  On 11/27/2018 Dr. Meenakshi Polanco performed a bronchoscopy, navigational protocol, endobronchial ultrasound and biopsy of the RUL of the lung mass along with multiple lymph node station Biopsies.  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   · All lymph nodes sampled were NEGATIVE for malignant cells including stations 10L, 4L, station 7, 10R, 4R.   · IHC studies were positive for CK7, TTF-1 and Napsin A.   · IHC studies on tumor cells were negative for CDX2, CK5/6, and p63   · Further lung profile molecular testing is pending  All of the above was discussed at length with Mr. Prater at his 12/13/2018 clinic visit.   He was agreeable for referral for consideration of resection of the lung lesion.   He is comfortable with and would like a referral to Dr. Ulysses Bardales ([phone]) at [facility], [address] [place] [address], [address], [place] [address].  Logistics, however, are planning a big part in his decision making and he may decide to have surgery done in the [facility].  If he decides to have  surgery in the [place], referral will be made to Dr. Frandy Patel.   CT chest with contrast 2/18/2019 at [facility] compared to 9/12/2018 revealed a 4.9 x 3.5 cm spiculated RUL lung mass which actually decreased in size from a prior value of 6.1 x 3.6 cm.   Subhepatic mass adjacent to the descending duodenum had increased in size significantly to 4.3 x 9 cm compared to 4.1 x 3.4 cm on the 10/11/18 MRI of the abdomen.  CT of the abdomen and pelvis without contrast on 3/20/2019 at [facility] was compared to outpatient CT data and pelvis on 9/12/2018 an MRI of the abdomen from 10/11/2018. A soft tissue mass was again encountered in the subhepatic space which abutted the distal stomach and proximal duodenum measuring 8.9 x 5.4 cm which has increased considerably from a prior measurement of 4.1 x 3.4 cm. Medial to the left renal hilum a 3.5 cm lobular mass causing some mild left-sided hydronephrosis.  Mr. Prater was referred to Dr. Frandy Patel who saw him on 1/22/2019 anticipating plans for a curative resection.   Dr. Michele received a phone call on 2/20/2019 from Dr. Patel , indicating that the previously documented an abdominal pancreatic area mass had doubled in size and was causing compression into the left kidney/renal pelvis producing a hydroureter.   Dr. Patel arranged a referral to Dr. Sylvester who will be placing a stent 3/8/2019.  Mr. Prater was scheduled to be seen by gastroenterology at [facility] on 3/13/2019 for EGD/EUS assessment and biopsy of the enlarging peripancreatic/duodenal area mass.  With a decrease in the lung mass and increased size of the subhepatic mass-surgical resection was abandoned at present pending further evaluation of the subhepatic mass.  #1 TUMOR HISTORY: Pancreatic mass diagnosed 10/29/03  Mr. Prater presented in October 2003 with obstructive jaundice.   A CT scan of the abdomen on 10/26/2003 documented a 3.2 x 4 x 4.2 cm mass in the head of the pancreas.   On 10/29/03 Dr. Alexis performed  a resection of a portion of the head of the pancreas on Fahad Prater along with a Klaudia-en-Y procedure and a choledochojejunostomy for what was felt to be a pancreatic cancer. His pancreas was bypassed because of the findings.  Pathology of the biopsies were negative for malignancy showing a fibrosed pancreas.   Mr. Prater was referred to Dr. Vishal High in [place].  Dr. Vishal High performed ERCP with an EUS and biopsy on 02/26/04   Pathology again was negative for cancer or malignancy.   Routine periodic serologic and radiographic monitoring has not disclosed progression of the mass or development of new malignancy or increase in pancreatic tumor markers.   A CT scan of the abdomen and pelvis with contrast on 9/12/2018 documented a 4.9 x 4 x 4 cm soft tissue mass with peripheral calcification immediately adjacent to the gallbladder in the head of the pancreas area.  An MRI of the abdomen and pelvis W & WO on 10/11/2018 identified in the 4.1 x 3.4 cm soft tissue mass in the subhepatic space, abutting the second portion of the duodenum with mild displacement of the duodenum. There does not appear to be involvement of the pancreas, and the pancreas appears within normal limits.  Differential diagnoses include a desmoid tumor, less likely leiomyoma of the wall of the duodenum or malignancy.   CT of the abdomen and pelvis without contrast on 3/20/2019 at [facility] was compared to outpatient CT data and pelvis on 9/12/2018 an MRI of the abdomen from 10/11/2018. A soft tissue mass was again encountered in the subhepatic space which abutted the distal stomach and proximal duodenum measuring 8.9 x 5.4 cm which has increased considerably from a prior measurement of 4.1 x 3.4 cm. Medial to the left renal hilum a 3.5 cm lobular mass causing some mild left-sided hydronephrosis.  Mr. Prater was scheduled for an EGD/EUS by Dr. Bryson Moe as an outpatient procedure on 3/13/2019 for assessment and biopsy of the enlarging  peripancreatic/duodenal area mass. Unfortunately, after initiation of the procedure, he began having ventricular tachycardia and the procedure had to be aborted. Karoline was transferred to the ICU for cardiology evaluation and stabilization. He remained hospitalized until 3/18/2019 when he was medically cleared for discharge.  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 No emergent urologic intervention was warranted and he received monitoring of renal function. He had a creatinine level of 1.9 at discharge.  PET scan on 4/2/2019 identified a soft tissue mass in the RUL measuring 1.2 x 3.5 cm with extension to the right anterior hilum with an SUV of 10.1. Evidence of mediastinal and hilar lymphadenopathy, with low-level metabolic activity. Interval increase in the size of a large mass in the right upper abdomen inseparable from the duodenum measuring 10.7 x 6.9 cm compared to 5.4 x 8.9 cm on 2/20/2019 with an SUV of 23.6. Slight increase in 2 small inseparable masses medial to the hilum of the left kidney measuring 2.2 and 2.6 cm and the other measuring 3.9 cm with a maximum SUV of 18.6.     #1 HEMATOLOGICAL HISTORY: IgG kappa MGUS- Dx: 02/23/06.  During the course of Mr. Prater’s follow up, an abnormally elevated protein was noted on one occasion, which led to workup including quantitative immunoglobulins.   An elevated IgG kappa was encountered. This has remained stable in the 2500 range since early 2006.   A bone marrow biopsy and aspirate on 02/23/06 was negative with only 4% plasma cells.   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   24 hour urine for immunoelectrophoresis did not reveal an M-spike.  Serology studies on 9/18/2018 documented an elevated, stable IgG of 2589 with an M spike of 0.7.   Immunofixation continued to reveal IgG monoclonal protein with kappa light chain specificity.     #2  HEMATOLOGICAL HISTORY: Iron deficiency anemia, 9/18/2018  Karoline had serology on 9/18/2018 that identified iron deficiency anemia.   His lab work was obtained at [facility].  An iron level was 12, iron saturation 7%, ferritin 256  Folate >20, and vitamin B12 1044.   Dr. Li placed Karoline on ferrous sulfate 325 mg daily on 9/25/2018 , but this failed to resolve the anemia.  An EGD by Dr. Randy Somers on 12/11/2018 documented a normal esophagus, normal stomach and a degree of duodenal deformity.  Gastric biopsy was urease negative.  Colonoscopy by Dr. Randy Somers on 1/9/2019 documented a polyp at 80 cm.  Pathology identified a tubular adenoma, negative for high-grade dysplasia.   Feraheme administered.    Labs on 3/13/2019:  · Iron 25   · Iron saturation 22%   · TIBC 116   · Ferritin >2000   · Reticulocyte count 0.0578   ·    · Haptoglobin 341   · Folate >20   · Vitamin B12 945   · Erythropoietin 13    He was admitted to [facility] 4/26/2019 with melena and anemia.    EGD per Dr. Jj on 4/29/2019 revealed   · LA grade (1 or more mucosal breaks greater than 5 mm, not extending between the tops of the 2 mucosal folds) esophagitis with no bleeding found in the distal esophagus  · Stomach was normal, biopsies for H. pylori taken.  · Cardia and gastric fundus were normal on retroflexion  · One nonbleeding cratered duodenal ulcer with no stigmata of bleeding was found in the duodenal bulb lesion was about 9 mm.  · Many nonbleeding cratered, linear and superficial duodenal ulcers with no stigmata of bleeding were found in the second portion of the duodenum.  The largest lesion was 6 mm in largest dimension.  No mass encountered and anastomosis not seen.     He was admitted to [facility] on 5/8/2019 with melena, hematochezia, anemia, thrombocytopenia    Endoscopy performed by Dr. Bansal on 5/9/2019 revealed  · Normal esophagus  · Gastric mucosal variant.  2 erythematous spots in the antrum, ? AVM  · Normal  examined duodenum  · Nothing found to account for symptoms      TREATMENT SUMMARY  1. Feraheme 510 mg IV on 2/14 and 2/21/19  2. Venofer 200 mg IV daily initiated 5/8/2019 as IP at [facility]    REVIEW OF SYSTEMS:   History obtained from chart review and the patient  General: positive for  - fatigue   ENT: positive for - left eye blindness  Allergy and Immunology: negative   Hematological and Lymphatic: negative for - weight loss  Respiratory: positive for - exertional shortness of breath  Cardiovascular: positve for -orthostatic symptoms  Gastrointestinal: positive for - melena and hematochezia  Genito-Urinary: negative for - dysuria or hematuria  Musculoskeletal: positive for - generalized weakness  Neurological: negative for - confusion, dizziness or headaches  Dermatological: negative for-  pruritus and rash    OBJECTIVE:    Vitals:    05/10/19 0435   BP: 131/75   Pulse: 96   Resp: 18   Temp: 98.2 °F (36.8 °C)   SpO2: 94%       Intake/Output Summary (Last 24 hours) at 5/10/2019 0647  Last data filed at 5/10/2019 0600  Gross per 24 hour   Intake 300 ml   Output 1600 ml   Net -1300 ml     PHYSICAL EXAM:  General Appearance: Alert, cooperative, appears chronically ill  Head: Normocephalic, without obvious abnormality, atraumatic  Eyes: Left eye issues/blindness, chronic  Ears: Ears appear intact with no abnormalities noted, hearing grossly normal  Throat: No oral lesions, no thrush, oral mucosa moist  Neck: No adenopathy, supple, trachea midline, no JVD  Lungs: Clear to auscultation, respirations regular, even and unlabored  Heart: Regular rhythm and normal rate  Abdomen: No tenderness or guarding, + BS  Genitalia: Deferred  Extremities: Moves all extremities well  Skin: No bleeding or rash, port right CW  Lymph nodes: No palpable adenopathy  Neurologic: Grossly intact though not formally tested    CBC  Results from last 7 days   Lab Units 05/09/19  1941 05/09/19  1320 05/09/19  0739 05/09/19  0027  05/08/19  1525   WBC  10*3/mm3  --   --  3.91* 2.80*  --  2.70*   HEMOGLOBIN g/dL 9.7* 10.1* 8.6* 8.4*   < > 7.0*   HEMATOCRIT % 29.0* 30.0* 25.1* 25.8*   < > 21.1*   PLATELETS 10*3/mm3  --   --  77* 77*  --  24*    < > = values in this interval not displayed.         Lab Results   Component Value Date     05/09/2019    K 4.3 05/09/2019     05/09/2019    CO2 22.0 (L) 05/09/2019    BUN 38 (H) 05/09/2019    CREATININE 1.25 05/09/2019    GLUCOSE 114 (H) 05/09/2019    CALCIUM 8.9 05/09/2019    BILITOT 0.3 05/08/2019    ALKPHOS 85 05/08/2019    AST 35 05/08/2019    ALT 77 (H) 05/08/2019    AGRATIO 0.9 (L) 05/08/2019    GLOB 3.4 05/08/2019       Lab Results   Component Value Date    INR 0.94 05/08/2019    INR 0.99 04/26/2019    INR 1.07 10/11/2018    PROTIME 12.9 05/08/2019    PROTIME 13.4 04/26/2019    PROTIME 14.2 10/11/2018       Cultures:    No results found for: BLOODCX  No components found for: URINCX    ASSESSMENT/PLAN:  #1.  GI bleed - Melena and hematochezia       Colonoscopy by Dr. Randy Somers on 1/9/2019 documented a polyp at 80 cm.  Pathology identified a tubular adenoma, negative for high-grade dysplasia.      EGD per Dr. Jj on 4/29/2019 revealed   · LA grade (1 or more mucosal breaks greater than 5 mm, not extending between the tops of the 2 mucosal folds) esophagitis with no bleeding found in the distal esophagus  · Stomach was normal, biopsies for H. pylori taken.  · Cardia and gastric fundus were normal on retroflexion  · One nonbleeding cratered duodenal ulcer with no stigmata of bleeding was found in the duodenal bulb lesion was about 9 mm.  · Many nonbleeding cratered, linear and superficial duodenal ulcers with no stigmata of bleeding were found in the second portion of the duodenum.  The largest lesion was 6 mm in largest dimension.  No mass encountered and anastomosis not seen.        HgB 5/8/19 was 7 on admission    He on protonix 40 mg PO daily    Endoscopy performed by Dr. Bansal on 5/9/2019  revealed  · Normal esophagus  · Gastric mucosal variant.  2 erythematous spots in the antrum, ? AVM  · Normal examined duodenum  · Nothing found to account for symptoms     HGB 9.7  5/9/2019 at 1941 hrs (s/p 2 units pRBC on 5/8/2019 )     ·  Venofer 200 mg IV daily initiated 5/8/2019      #2.  Thrombocytopenia from chemotherapy     PT/INR - 12.9/0.94  PTT - 36        PLT 77,000 on 5/9/2019 (s/p 2 pheresis plts 5/8/2019 )     #3.  Leukopenia r/t chemotherapy  - He did NOT receive Neulasta support  - WBC 3.91, ANC 3.27 on 5/9/19        #4.  Adenocarcinoma of the RUL of the lung     Mr. Prater was referred to Dr. Frandy Patel who saw him on 1/22/2019 anticipating plans for a curative resection.   CT chest with contrast 2/18/2019 at [facility] compared to 9/12/2018 revealed a 4.9 x 3.5 cm spiculated RUL lung mass which actually decreased in size from a prior value of 6.1 x 3.6 cm.   Subhepatic mass adjacent to the descending duodenum had increased in size significantly to 4.3 x 9 cm compared to 4.1 x 3.4 cm on the 10/11/18 MRI of the abdomen.  With a decrease in the lung mass and increased size of the subhepatic mass-surgical resection has abandoned at this time.     Mediport was placed by Dr. Jeff Cervantes 4/15/19 and Mr. Prater initiated cycle #1 of palliative chemotherapy with Carboplatinum, Alimta and Keytruda on 4/18/19.         #5.  Metastatic disease with pancreatic mass that is inseparable from the duodenal area  Mr. Prater was scheduled for an EGD/EUS by Dr. Bryson Moe as an outpatient procedure on 3/13/2019 for assessment and biopsy of the enlarging peripancreatic/duodenal area mass.   Unfortunately, after initiation of the procedure, he began having ventricular tachycardia and the procedure had to be aborted. Karoline was transferred to the ICU for cardiology evaluation and stabilization.   He remained hospitalized until 3/18/2019 when he was medically cleared for discharge.     PET scan on 4/2/2019 identified a soft  tissue mass in the RUL measuring 1.2 x 3.5 cm with extension to the right anterior hilum with an SUV of 10.1. Evidence of mediastinal and hilar lymphadenopathy, with low-level metabolic activity. Interval increase in the size of a large mass in the right upper abdomen inseparable from the duodenum measuring 10.7 x 6.9 cm compared to 5.4 x 8.9 cm on 2/20/2019 with an SUV of 23.6. Slight increase in 2 small inseparable masses medial to the hilum of the left kidney measuring 2.2 and 2.6 cm and the other measuring 3.9 cm with a maximum SUV of 18.6.      Again, Cycle #1 of palliative chemotherapy with Carboplatin, Alimta and Keytruda was initiated 4/18/19     Abdominal/pelvic CT 4/26/19 measured the RUQ mass, within the duodenum to be decreased at 6.8 x 6.2 cm.      #6.   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   crt 1.25 GFR 56 on 5/9/19        PATRICA Hurtado    05/10/19  6:47 AM        Mr. Prater states she does not desired to have a colonoscopy.  He did not have a good experience on a previous one or 2 that he had.    Discussed with GI, Dr. Somers this morning.    I personally saw and examined this patient, performing a face-to-face diagnostic evaluation with plan of care reviewed and developed with  Jay Thomas PA-C and nursing staff.   I have addended and/or modified the above history of present illness, physical examination, and assessment and plan to reflect my findings and impressions.   Essential elements of the care plan were discussed with  Jay Thomas PA-C .   Agree with findings and assessment/plan as documented above.   Questions were encouraged, asked and answered to their understanding and satisfaction.    Jeff Michele MD  5/10/2019 8:34 AM

## 2019-05-10 NOTE — PLAN OF CARE
Problem: Patient Care Overview  Goal: Plan of Care Review  Outcome: Ongoing (interventions implemented as appropriate)   05/10/19 0050   Coping/Psychosocial   Plan of Care Reviewed With patient   Plan of Care Review   Progress no change   OTHER   Outcome Summary 2 units of prbc's given and 2 bags of plts given; hgb = 10.1 and plts = 77,000;Endo on 5-9-19 showed no active bleed and healed ulcers; right port no access; no longer on protonix gtt       Problem: Skin Injury Risk (Adult)  Goal: Identify Related Risk Factors and Signs and Symptoms  Outcome: Ongoing (interventions implemented as appropriate)   05/09/19 8727   Skin Injury Risk (Adult)   Related Risk Factors (Skin Injury Risk) advanced age;mobility impaired;medication     Goal: Skin Health and Integrity  Outcome: Ongoing (interventions implemented as appropriate)   05/10/19 0050   Skin Injury Risk (Adult)   Skin Health and Integrity making progress toward outcome       Problem: Gastrointestinal Bleeding (Adult)  Goal: Signs and Symptoms of Listed Potential Problems Will be Absent, Minimized or Managed (Gastrointestinal Bleeding)  Outcome: Ongoing (interventions implemented as appropriate)   05/10/19 0050   Goal/Outcome Evaluation   Problems Assessed (GI Bleeding) all   Problems Present (GI Bleeding) situational response

## 2019-05-10 NOTE — DISCHARGE SUMMARY
"    [facility]  DISCHARGE SUMMARY       Date of Admission: 5/8/2019  Date of Discharge:  5/10/2019  Primary Care Physician: Irving Alexis MD    Presenting Problem/History of Present Illness:  Gastrointestinal hemorrhage, unspecified gastrointestinal hemorrhage type [K92.2]  Gastrointestinal hemorrhage, unspecified gastrointestinal hemorrhage type [K92.2]     Final Discharge Diagnoses:  Active Hospital Problems    Diagnosis   • Gastrointestinal hemorrhage      1.  GI bleed  -Protonix  -plts; prbc's   -trend H&H  -Consult GI     2.  ABLA on Chronic anemia  -Transfuse PRBC's, plts  -consult GI  -protonix     3.  Pancytopenia due to chemotherapy  -consult heme-onc     4.  PUD  -protonix  -carafate  -consult GI     5.  Thrombocytopenia due to chemotherapy  -Transfused plts  -Heme-onc following  -trend     6.  Adenocarcinoma of right lung  -Heme-onc following     7.  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 Michele of oncology     8.  Left hydronephrosis  -follows with Dr. Sylvester as an outpt  -Flomax     9.  HTN  -Norvasc     10.  GERD  -Protonix  -Carafate     11.  HLD  -Elevated LFT's, will hold off on adding Statin     12.  BPH  -flomax     13.  S/p enucleation  -supportive care     14.  CKD 3  -gentle IVF  -monitor                       Consults: GI, Heme-onc    Procedures Performed: EGD    Pertinent Test Results:   Day of admit:  Hgb 7.0, Platelets 24    EGD showed no active bleeding, there were two small spots of mucosal variance with erythema in the stomach    Chief Complaint on Day of Discharge: \"I want to go home.\"    History of Present Illness on Day of Discharge:    Doing ok, no black or bloody stools    Hospital Course:  The patient is a 78 y.o. male who presented to [facility] with bloody stools.  He was found to be anemic and severely thrombocytopenic.  His platelets were 24,000.  " "He was transfused 2 units of PRBC's and 2 units of platelets.  His hemoglobin improved.  He was treated with a protonix drip.  GI was consulted and performed an EGD that showed no active bleeding.      Today his counts are better, and he would like to go home.      GI and heme-onc have cleared him for discharge.  At the time of discharge, the patient and his family are comfortable with the discharge and with the discharge plan.  They were given the chance to ask questions and all of their questions were answered to their complete satisfaction.      Condition on Discharge:  Stable    Physical Exam on Discharge:  /70 (BP Location: Right arm, Patient Position: Sitting)   Pulse 94   Temp 97.8 °F (36.6 °C) (Oral)   Resp 16   Ht 162.6 cm (64.02\")   Wt 62.7 kg (138 lb 3.7 oz)   SpO2 98%   BMI 23.71 kg/m²   Physical Exam   Constitutional: He is oriented to person, place, and time. He appears well-developed and well-nourished.   HENT:   Head: Normocephalic and atraumatic.   Right Ear: External ear normal.   Left Ear: External ear normal.   Nose: Nose normal.   Mouth/Throat: Oropharynx is clear and moist.   Eyes: Conjunctivae are normal. Right eye exhibits no discharge. No scleral icterus.   L eye s/p enucleation   Neck: Normal range of motion. Neck supple. No tracheal deviation present. No thyromegaly present.   Cardiovascular: Normal rate, regular rhythm, normal heart sounds and intact distal pulses. Exam reveals no gallop and no friction rub.   No murmur heard.  Pulmonary/Chest: Effort normal and breath sounds normal. No stridor. No respiratory distress. He has no wheezes. He has no rales. He exhibits no tenderness.   Abdominal: Soft. Bowel sounds are normal. He exhibits no distension and no mass. There is no tenderness. There is no rebound and no guarding. No hernia.   Musculoskeletal: Normal range of motion. He exhibits no edema or deformity.   Lymphadenopathy:     He has no cervical adenopathy. "   Neurological: He is alert and oriented to person, place, and time. He has normal reflexes. He displays normal reflexes. No cranial nerve deficit. He exhibits normal muscle tone. Coordination normal.   Skin: Skin is warm and dry. No rash noted. No erythema. No pallor.   Psychiatric: He has a normal mood and affect. His behavior is normal. Judgment and thought content normal.   Vitals reviewed.        Discharge Disposition:  [place] [facility]    Discharge Medications:     Discharge Medications      Continue These Medications      Instructions Start Date   amLODIPine 5 MG tablet  Commonly known as:  NORVASC   5 mg, Oral, Every 24 Hours Scheduled      folic acid 1 MG tablet  Commonly known as:  FOLVITE   1 mg, Oral, Daily      megestrol 40 MG/ML suspension  Commonly known as:  MEGACE   800 mg, Oral, Daily      melatonin 5 MG tablet tablet   5 mg, Oral, Nightly PRN      MULTIVITAMIN ADULT PO   1 tablet, Oral, Daily      pantoprazole 40 MG EC tablet  Commonly known as:  PROTONIX   40 mg, Oral, Daily      sodium bicarbonate 650 MG tablet   650 mg, Oral, 2 Times Daily      sucralfate 1 GM/10ML suspension  Commonly known as:  CARAFATE   2 g, Oral, 2 Times Daily      tamsulosin 0.4 MG capsule 24 hr capsule  Commonly known as:  FLOMAX   1 capsule, Oral, Nightly      zolpidem 5 MG tablet  Commonly known as:  AMBIEN   5 mg, Oral, Nightly PRN             Discharge Diet: regular    Activity at Discharge: as tolerated    Discharge Care Plan/Instructions: NA    Follow-up Appointments:   No future appointments.    Test Results Pending at Discharge: NA    Kenneth Prater MD  05/10/19  2:27 PM    Time: 35 minutes

## 2019-05-10 NOTE — PROGRESS NOTES
Continued Stay Note   Antoinette     Patient Name: Karoline Prater  MRN: [id number]  Today's Date: 5/10/2019    Admit Date: 5/8/2019    Discharge Plan     Row Name 05/10/19 1430       Plan    Plan  Home with     Patient/Family in Agreement with Plan  yes    Plan Comments  Patient informed  he would like [facility].  RACHEL called referral to [facility] [phone], spoke with Kimberlee.  Referral info faxed to [phone]    Final Discharge Disposition Code  06 - home with home health care    Final Note  [facility]        Discharge Codes    No documentation.       Expected Discharge Date and Time     Expected Discharge Date Expected Discharge Time    May 10, 2019             ANDI Quintana

## 2019-05-11 ENCOUNTER — READMISSION MANAGEMENT (OUTPATIENT)
Dept: CALL CENTER | Facility: HOSPITAL | Age: 79
End: 2019-05-11

## 2019-05-11 NOTE — OUTREACH NOTE
Prep Survey      Responses   Facility patient discharged from?  [facility]   Is patient eligible?  Yes   Discharge diagnosis  GI Bleed   Does the patient have one of the following disease processes/diagnoses(primary or secondary)?  Other   Does the patient have Home health ordered?  Yes   What is the Home health agency?   [facility]   Is there a DME ordered?  No   Comments regarding appointments  see AVS   Prep survey completed?  Yes          Eliz Jernigan RN

## 2019-05-14 ENCOUNTER — READMISSION MANAGEMENT (OUTPATIENT)
Dept: CALL CENTER | Facility: HOSPITAL | Age: 79
End: 2019-05-14

## 2019-05-14 NOTE — OUTREACH NOTE
Medical Week 1 Survey      Responses   Facility patient discharged from?  [facility]   Does the patient have one of the following disease processes/diagnoses(primary or secondary)?  Other   Is there a successful TCM telephone encounter documented?  No   Week 1 attempt successful?  Yes   Call start time  1325   Call end time  1332   Discharge diagnosis  GI Bleed   Meds reviewed with patient/caregiver?  Yes   Is the patient having any side effects they believe may be caused by any medication additions or changes?  No   Does the patient have all medications ordered at discharge?  Yes   Is the patient taking all medications as directed (includes completed medication regime)?  Yes   Does the patient have a primary care provider?   Yes   Does the patient have an appointment with their PCP within 7 days of discharge?  Yes   Has the patient kept scheduled appointments due by today?  Yes   What is the Home health agency?   [facility]   Has home health visited the patient within 72 hours of discharge?  Yes   Psychosocial issues?  No   Comments  Patient is doing better. Stool color has turned brown with no evidence of blood currently. No nausea or vomiting.    Did the patient receive a copy of their discharge instructions?  Yes   Nursing interventions  Reviewed instructions with patient   What is the patient's perception of their health status since discharge?  Improving   Is the patient/caregiver able to teach back signs and symptoms related to disease process for when to call PCP?  Yes   Is the patient/caregiver able to teach back signs and symptoms related to disease process for when to call 911?  Yes   Is the patient/caregiver able to teach back the hierarchy of who to call/visit for symptoms/problems? PCP, Specialist, Home health nurse, Urgent Care, ED, 911  Yes   Week 1 call completed?  Yes          Paul Cadena RN

## 2019-05-21 ENCOUNTER — READMISSION MANAGEMENT (OUTPATIENT)
Dept: CALL CENTER | Facility: HOSPITAL | Age: 79
End: 2019-05-21

## 2019-05-21 NOTE — OUTREACH NOTE
Medical Week 2 Survey      Responses   Facility patient discharged from?  [facility]   Does the patient have one of the following disease processes/diagnoses(primary or secondary)?  Other   Week 2 attempt successful?  Yes   Call start time  1716   Revoke  Decline to participate [He states I am doing good. Do not have to check on me. Advised to call if need assist.]   Call end time  1717          Courtney Camilo RN

## 2019-05-29 ENCOUNTER — APPOINTMENT (OUTPATIENT)
Dept: GENERAL RADIOLOGY | Facility: HOSPITAL | Age: 79
End: 2019-05-29

## 2019-05-29 ENCOUNTER — HOSPITAL ENCOUNTER (INPATIENT)
Facility: HOSPITAL | Age: 79
LOS: 3 days | Discharge: HOME HEALTH | End: 2019-06-01
Attending: EMERGENCY MEDICINE | Admitting: FAMILY MEDICINE

## 2019-05-29 DIAGNOSIS — R50.81 NEUTROPENIC FEVER (HCC): Primary | ICD-10-CM

## 2019-05-29 DIAGNOSIS — D70.9 NEUTROPENIC FEVER (HCC): Primary | ICD-10-CM

## 2019-05-29 LAB
ALBUMIN SERPL-MCNC: 3.2 G/DL (ref 3.5–5)
ALBUMIN/GLOB SERPL: 0.9 G/DL (ref 1.1–2.5)
ALP SERPL-CCNC: 105 U/L (ref 24–120)
ALT SERPL W P-5'-P-CCNC: 44 U/L (ref 0–54)
ANION GAP SERPL CALCULATED.3IONS-SCNC: 8 MMOL/L (ref 4–13)
ANISOCYTOSIS BLD QL: ABNORMAL
AST SERPL-CCNC: 57 U/L (ref 7–45)
BACTERIA UR QL AUTO: ABNORMAL /HPF
BILIRUB SERPL-MCNC: 0.6 MG/DL (ref 0.1–1)
BILIRUB UR QL STRIP: NEGATIVE
BUN BLD-MCNC: 47 MG/DL (ref 5–21)
BUN/CREAT SERPL: 30.3 (ref 7–25)
CALCIUM SPEC-SCNC: 9.4 MG/DL (ref 8.4–10.4)
CHLORIDE SERPL-SCNC: 103 MMOL/L (ref 98–110)
CLARITY UR: CLEAR
CO2 SERPL-SCNC: 22 MMOL/L (ref 24–31)
COLOR UR: YELLOW
CREAT BLD-MCNC: 1.55 MG/DL (ref 0.5–1.4)
D-LACTATE SERPL-SCNC: 1 MMOL/L (ref 0.5–2)
DEPRECATED RDW RBC AUTO: 46.6 FL (ref 40–54)
EOSINOPHIL # BLD MANUAL: 0.07 10*3/MM3 (ref 0–0.7)
EOSINOPHIL NFR BLD MANUAL: 5.1 % (ref 0–4)
ERYTHROCYTE [DISTWIDTH] IN BLOOD BY AUTOMATED COUNT: 18.7 % (ref 12–15)
GFR SERPL CREATININE-BSD FRML MDRD: 44 ML/MIN/1.73
GIANT PLATELETS: ABNORMAL
GLOBULIN UR ELPH-MCNC: 3.5 GM/DL
GLUCOSE BLD-MCNC: 121 MG/DL (ref 70–100)
GLUCOSE UR STRIP-MCNC: NEGATIVE MG/DL
HCT VFR BLD AUTO: 22.1 % (ref 40–52)
HGB BLD-MCNC: 7.3 G/DL (ref 14–18)
HGB UR QL STRIP.AUTO: NEGATIVE
HOLD SPECIMEN: NORMAL
HYALINE CASTS UR QL AUTO: ABNORMAL /LPF
KETONES UR QL STRIP: NEGATIVE
LEUKOCYTE ESTERASE UR QL STRIP.AUTO: NEGATIVE
LYMPHOCYTES # BLD MANUAL: 0.63 10*3/MM3 (ref 0.72–4.86)
LYMPHOCYTES NFR BLD MANUAL: 44.4 % (ref 15–45)
LYMPHOCYTES NFR BLD MANUAL: 6.1 % (ref 4–12)
MCH RBC QN AUTO: 27.5 PG (ref 28–32)
MCHC RBC AUTO-ENTMCNC: 33 G/DL (ref 33–36)
MCV RBC AUTO: 83.4 FL (ref 82–95)
MICROCYTES BLD QL: ABNORMAL
MONOCYTES # BLD AUTO: 0.09 10*3/MM3 (ref 0.19–1.3)
NEUTROPHILS # BLD AUTO: 0.63 10*3/MM3 (ref 1.87–8.4)
NEUTROPHILS NFR BLD MANUAL: 44.4 % (ref 39–78)
NITRITE UR QL STRIP: NEGATIVE
PH UR STRIP.AUTO: 6 [PH] (ref 5–8)
PLATELET # BLD AUTO: 131 10*3/MM3 (ref 130–400)
PMV BLD AUTO: 11.2 FL (ref 6–12)
POLYCHROMASIA BLD QL SMEAR: ABNORMAL
POTASSIUM BLD-SCNC: 5 MMOL/L (ref 3.5–5.3)
PROCALCITONIN SERPL-MCNC: 0.44 NG/ML
PROT SERPL-MCNC: 6.7 G/DL (ref 6.3–8.7)
PROT UR QL STRIP: ABNORMAL
RBC # BLD AUTO: 2.65 10*6/MM3 (ref 4.8–5.9)
RBC # UR: ABNORMAL /HPF
REF LAB TEST METHOD: ABNORMAL
SMALL PLATELETS BLD QL SMEAR: ADEQUATE
SODIUM BLD-SCNC: 133 MMOL/L (ref 135–145)
SP GR UR STRIP: 1.01 (ref 1–1.03)
SQUAMOUS #/AREA URNS HPF: ABNORMAL /HPF
UROBILINOGEN UR QL STRIP: ABNORMAL
WBC MORPH BLD: NORMAL
WBC NRBC COR # BLD: 1.42 10*3/MM3 (ref 4.8–10.8)
WBC UR QL AUTO: ABNORMAL /HPF
WHOLE BLOOD HOLD SPECIMEN: NORMAL

## 2019-05-29 PROCEDURE — 84145 PROCALCITONIN (PCT): CPT | Performed by: EMERGENCY MEDICINE

## 2019-05-29 PROCEDURE — 83605 ASSAY OF LACTIC ACID: CPT | Performed by: EMERGENCY MEDICINE

## 2019-05-29 PROCEDURE — 81001 URINALYSIS AUTO W/SCOPE: CPT | Performed by: EMERGENCY MEDICINE

## 2019-05-29 PROCEDURE — 71045 X-RAY EXAM CHEST 1 VIEW: CPT

## 2019-05-29 PROCEDURE — 94760 N-INVAS EAR/PLS OXIMETRY 1: CPT

## 2019-05-29 PROCEDURE — 85007 BL SMEAR W/DIFF WBC COUNT: CPT | Performed by: EMERGENCY MEDICINE

## 2019-05-29 PROCEDURE — 99284 EMERGENCY DEPT VISIT MOD MDM: CPT

## 2019-05-29 PROCEDURE — 94799 UNLISTED PULMONARY SVC/PX: CPT

## 2019-05-29 PROCEDURE — 87040 BLOOD CULTURE FOR BACTERIA: CPT | Performed by: NURSE PRACTITIONER

## 2019-05-29 PROCEDURE — 25010000002 VANCOMYCIN 1 G RECONSTITUTED SOLUTION: Performed by: EMERGENCY MEDICINE

## 2019-05-29 PROCEDURE — 85025 COMPLETE CBC W/AUTO DIFF WBC: CPT | Performed by: EMERGENCY MEDICINE

## 2019-05-29 PROCEDURE — 25010000002 FILGRASTIM PER 480 MCG: Performed by: NURSE PRACTITIONER

## 2019-05-29 PROCEDURE — 80053 COMPREHEN METABOLIC PANEL: CPT | Performed by: EMERGENCY MEDICINE

## 2019-05-29 PROCEDURE — 87040 BLOOD CULTURE FOR BACTERIA: CPT | Performed by: EMERGENCY MEDICINE

## 2019-05-29 PROCEDURE — 25010000002 LEVOFLOXACIN PER 250 MG: Performed by: EMERGENCY MEDICINE

## 2019-05-29 RX ORDER — TAMSULOSIN HYDROCHLORIDE 0.4 MG/1
0.4 CAPSULE ORAL NIGHTLY
Status: DISCONTINUED | OUTPATIENT
Start: 2019-05-29 | End: 2019-06-01 | Stop reason: HOSPADM

## 2019-05-29 RX ORDER — SODIUM CHLORIDE 0.9 % (FLUSH) 0.9 %
3 SYRINGE (ML) INJECTION EVERY 12 HOURS SCHEDULED
Status: DISCONTINUED | OUTPATIENT
Start: 2019-05-29 | End: 2019-06-01 | Stop reason: HOSPADM

## 2019-05-29 RX ORDER — PANTOPRAZOLE SODIUM 40 MG/1
40 TABLET, DELAYED RELEASE ORAL
Status: DISCONTINUED | OUTPATIENT
Start: 2019-05-30 | End: 2019-06-01 | Stop reason: HOSPADM

## 2019-05-29 RX ORDER — MEGESTROL ACETATE 40 MG/ML
800 SUSPENSION ORAL DAILY
Status: DISCONTINUED | OUTPATIENT
Start: 2019-05-30 | End: 2019-06-01 | Stop reason: HOSPADM

## 2019-05-29 RX ORDER — ACETAMINOPHEN 325 MG/1
650 TABLET ORAL EVERY 4 HOURS PRN
Status: DISCONTINUED | OUTPATIENT
Start: 2019-05-29 | End: 2019-06-01 | Stop reason: HOSPADM

## 2019-05-29 RX ORDER — AMLODIPINE BESYLATE 5 MG/1
5 TABLET ORAL
Status: DISCONTINUED | OUTPATIENT
Start: 2019-05-29 | End: 2019-05-31

## 2019-05-29 RX ORDER — LEVOFLOXACIN 5 MG/ML
500 INJECTION, SOLUTION INTRAVENOUS ONCE
Status: COMPLETED | OUTPATIENT
Start: 2019-05-29 | End: 2019-05-29

## 2019-05-29 RX ORDER — ONDANSETRON 4 MG/1
4 TABLET, FILM COATED ORAL EVERY 6 HOURS PRN
Status: DISCONTINUED | OUTPATIENT
Start: 2019-05-29 | End: 2019-06-01 | Stop reason: HOSPADM

## 2019-05-29 RX ORDER — SODIUM BICARBONATE 650 MG/1
650 TABLET ORAL 2 TIMES DAILY
Status: DISCONTINUED | OUTPATIENT
Start: 2019-05-29 | End: 2019-06-01 | Stop reason: HOSPADM

## 2019-05-29 RX ORDER — ONDANSETRON 2 MG/ML
4 INJECTION INTRAMUSCULAR; INTRAVENOUS EVERY 6 HOURS PRN
Status: DISCONTINUED | OUTPATIENT
Start: 2019-05-29 | End: 2019-06-01 | Stop reason: HOSPADM

## 2019-05-29 RX ORDER — SODIUM CHLORIDE 9 MG/ML
75 INJECTION, SOLUTION INTRAVENOUS CONTINUOUS
Status: DISCONTINUED | OUTPATIENT
Start: 2019-05-29 | End: 2019-06-01 | Stop reason: HOSPADM

## 2019-05-29 RX ORDER — SODIUM CHLORIDE 0.9 % (FLUSH) 0.9 %
3-10 SYRINGE (ML) INJECTION AS NEEDED
Status: DISCONTINUED | OUTPATIENT
Start: 2019-05-29 | End: 2019-06-01 | Stop reason: HOSPADM

## 2019-05-29 RX ORDER — LANOLIN ALCOHOL/MO/W.PET/CERES
5 CREAM (GRAM) TOPICAL NIGHTLY PRN
Status: DISCONTINUED | OUTPATIENT
Start: 2019-05-29 | End: 2019-06-01 | Stop reason: HOSPADM

## 2019-05-29 RX ORDER — SUCRALFATE ORAL 1 G/10ML
1 SUSPENSION ORAL
Status: DISCONTINUED | OUTPATIENT
Start: 2019-05-29 | End: 2019-06-01 | Stop reason: HOSPADM

## 2019-05-29 RX ORDER — ZOLPIDEM TARTRATE 5 MG/1
5 TABLET ORAL NIGHTLY PRN
Status: DISCONTINUED | OUTPATIENT
Start: 2019-05-29 | End: 2019-06-01 | Stop reason: HOSPADM

## 2019-05-29 RX ADMIN — LEVOFLOXACIN 500 MG: 5 INJECTION, SOLUTION INTRAVENOUS at 17:52

## 2019-05-29 RX ADMIN — FILGRASTIM 480 MCG: 480 INJECTION, SOLUTION INTRAVENOUS; SUBCUTANEOUS at 22:05

## 2019-05-29 RX ADMIN — AMLODIPINE BESYLATE 5 MG: 5 TABLET ORAL at 21:03

## 2019-05-29 RX ADMIN — Medication 500 UNITS: at 23:03

## 2019-05-29 RX ADMIN — SODIUM CHLORIDE, PRESERVATIVE FREE 3 ML: 5 INJECTION INTRAVENOUS at 21:06

## 2019-05-29 RX ADMIN — SODIUM CHLORIDE 2 G: 900 INJECTION INTRAVENOUS at 22:37

## 2019-05-29 RX ADMIN — Medication 5.25 MG: at 23:47

## 2019-05-29 RX ADMIN — SUCRALFATE 1 G: 1 SUSPENSION ORAL at 21:03

## 2019-05-29 RX ADMIN — SODIUM CHLORIDE 100 ML/HR: 9 INJECTION, SOLUTION INTRAVENOUS at 20:02

## 2019-05-29 RX ADMIN — TAMSULOSIN HYDROCHLORIDE 0.4 MG: 0.4 CAPSULE ORAL at 21:03

## 2019-05-29 RX ADMIN — SODIUM BICARBONATE 650 MG: 650 TABLET ORAL at 21:03

## 2019-05-29 RX ADMIN — VANCOMYCIN HYDROCHLORIDE 1000 MG: 1 INJECTION, POWDER, LYOPHILIZED, FOR SOLUTION INTRAVENOUS at 20:01

## 2019-05-30 LAB
ABO GROUP BLD: NORMAL
ALBUMIN SERPL-MCNC: 2.7 G/DL (ref 3.5–5)
ALBUMIN/GLOB SERPL: 0.9 G/DL (ref 1.1–2.5)
ALP SERPL-CCNC: 90 U/L (ref 24–120)
ALT SERPL W P-5'-P-CCNC: 40 U/L (ref 0–54)
ANION GAP SERPL CALCULATED.3IONS-SCNC: 8 MMOL/L (ref 4–13)
ANISOCYTOSIS BLD QL: ABNORMAL
ARTICHOKE IGE QN: 58 MG/DL (ref 0–99)
AST SERPL-CCNC: 38 U/L (ref 7–45)
BASOPHILS # BLD MANUAL: 0.01 10*3/MM3 (ref 0–0.2)
BASOPHILS NFR BLD AUTO: 1 % (ref 0–2)
BILIRUB SERPL-MCNC: 0.7 MG/DL (ref 0.1–1)
BLD GP AB SCN SERPL QL: NEGATIVE
BUN BLD-MCNC: 39 MG/DL (ref 5–21)
BUN/CREAT SERPL: 28.1 (ref 7–25)
CALCIUM SPEC-SCNC: 9 MG/DL (ref 8.4–10.4)
CHLORIDE SERPL-SCNC: 106 MMOL/L (ref 98–110)
CHOLEST SERPL-MCNC: 99 MG/DL (ref 130–200)
CO2 SERPL-SCNC: 21 MMOL/L (ref 24–31)
CREAT BLD-MCNC: 1.39 MG/DL (ref 0.5–1.4)
DEPRECATED RDW RBC AUTO: 48.5 FL (ref 40–54)
DOHLE BODIES: PRESENT
EOSINOPHIL # BLD MANUAL: 0.05 10*3/MM3 (ref 0–0.7)
EOSINOPHIL NFR BLD MANUAL: 4 % (ref 0–4)
ERYTHROCYTE [DISTWIDTH] IN BLOOD BY AUTOMATED COUNT: 18.4 % (ref 12–15)
GFR SERPL CREATININE-BSD FRML MDRD: 50 ML/MIN/1.73
GLOBULIN UR ELPH-MCNC: 3 GM/DL
GLUCOSE BLD-MCNC: 88 MG/DL (ref 70–100)
HBA1C MFR BLD: 6.5 %
HCT VFR BLD AUTO: 20.4 % (ref 40–52)
HDLC SERPL-MCNC: 25 MG/DL
HGB BLD-MCNC: 6.6 G/DL (ref 14–18)
LDLC/HDLC SERPL: 1.9 {RATIO}
LYMPHOCYTES # BLD MANUAL: 0.38 10*3/MM3 (ref 0.72–4.86)
LYMPHOCYTES NFR BLD MANUAL: 2 % (ref 4–12)
LYMPHOCYTES NFR BLD MANUAL: 31.3 % (ref 15–45)
MCH RBC QN AUTO: 27.5 PG (ref 28–32)
MCHC RBC AUTO-ENTMCNC: 32.4 G/DL (ref 33–36)
MCV RBC AUTO: 85 FL (ref 82–95)
METAMYELOCYTES NFR BLD MANUAL: 1 % (ref 0–0)
MICROCYTES BLD QL: ABNORMAL
MONOCYTES # BLD AUTO: 0.02 10*3/MM3 (ref 0.19–1.3)
NEUTROPHILS # BLD AUTO: 0.69 10*3/MM3 (ref 1.7–7)
NEUTROPHILS NFR BLD MANUAL: 45.5 % (ref 39–78)
NEUTS BAND NFR BLD MANUAL: 12.1 % (ref 0–10)
PLATELET # BLD AUTO: 92 10*3/MM3 (ref 130–400)
PMV BLD AUTO: 10.9 FL (ref 6–12)
POLYCHROMASIA BLD QL SMEAR: ABNORMAL
POTASSIUM BLD-SCNC: 4.4 MMOL/L (ref 3.5–5.3)
PROT SERPL-MCNC: 5.7 G/DL (ref 6.3–8.7)
RBC # BLD AUTO: 2.4 10*6/MM3 (ref 4.8–5.9)
RH BLD: POSITIVE
SMALL PLATELETS BLD QL SMEAR: ABNORMAL
SODIUM BLD-SCNC: 135 MMOL/L (ref 135–145)
SPHEROCYTES BLD QL SMEAR: ABNORMAL
T&S EXPIRATION DATE: NORMAL
TRIGL SERPL-MCNC: 132 MG/DL (ref 0–149)
TSH SERPL DL<=0.05 MIU/L-ACNC: 1.34 MIU/ML (ref 0.47–4.68)
VARIANT LYMPHS NFR BLD MANUAL: 3 % (ref 0–5)
WBC NRBC COR # BLD: 1.2 10*3/MM3 (ref 4.8–10.8)

## 2019-05-30 PROCEDURE — 63710000001 DIPHENHYDRAMINE PER 50 MG: Performed by: PHYSICIAN ASSISTANT

## 2019-05-30 PROCEDURE — 84443 ASSAY THYROID STIM HORMONE: CPT | Performed by: NURSE PRACTITIONER

## 2019-05-30 PROCEDURE — 86850 RBC ANTIBODY SCREEN: CPT | Performed by: PHYSICIAN ASSISTANT

## 2019-05-30 PROCEDURE — 25010000002 VANCOMYCIN 10 G RECONSTITUTED SOLUTION: Performed by: NURSE PRACTITIONER

## 2019-05-30 PROCEDURE — 83036 HEMOGLOBIN GLYCOSYLATED A1C: CPT | Performed by: NURSE PRACTITIONER

## 2019-05-30 PROCEDURE — 85007 BL SMEAR W/DIFF WBC COUNT: CPT | Performed by: NURSE PRACTITIONER

## 2019-05-30 PROCEDURE — 86900 BLOOD TYPING SEROLOGIC ABO: CPT | Performed by: PHYSICIAN ASSISTANT

## 2019-05-30 PROCEDURE — 25010000002 FUROSEMIDE PER 20 MG: Performed by: PHYSICIAN ASSISTANT

## 2019-05-30 PROCEDURE — 80053 COMPREHEN METABOLIC PANEL: CPT | Performed by: NURSE PRACTITIONER

## 2019-05-30 PROCEDURE — 85025 COMPLETE CBC W/AUTO DIFF WBC: CPT | Performed by: NURSE PRACTITIONER

## 2019-05-30 PROCEDURE — 36430 TRANSFUSION BLD/BLD COMPNT: CPT

## 2019-05-30 PROCEDURE — 25010000002 HYDROCORTISONE SODIUM SUCCINATE 100 MG RECONSTITUTED SOLUTION: Performed by: PHYSICIAN ASSISTANT

## 2019-05-30 PROCEDURE — 86901 BLOOD TYPING SEROLOGIC RH(D): CPT | Performed by: PHYSICIAN ASSISTANT

## 2019-05-30 PROCEDURE — P9016 RBC LEUKOCYTES REDUCED: HCPCS

## 2019-05-30 PROCEDURE — 86900 BLOOD TYPING SEROLOGIC ABO: CPT

## 2019-05-30 PROCEDURE — 80061 LIPID PANEL: CPT | Performed by: NURSE PRACTITIONER

## 2019-05-30 PROCEDURE — 86923 COMPATIBILITY TEST ELECTRIC: CPT

## 2019-05-30 RX ORDER — ROPINIROLE 0.25 MG/1
0.5 TABLET, FILM COATED ORAL EVERY 12 HOURS SCHEDULED
Status: DISCONTINUED | OUTPATIENT
Start: 2019-05-30 | End: 2019-06-01 | Stop reason: HOSPADM

## 2019-05-30 RX ORDER — FUROSEMIDE 10 MG/ML
20 INJECTION INTRAMUSCULAR; INTRAVENOUS ONCE
Status: COMPLETED | OUTPATIENT
Start: 2019-05-30 | End: 2019-05-30

## 2019-05-30 RX ORDER — ACETAMINOPHEN 325 MG/1
650 TABLET ORAL ONCE
Status: COMPLETED | OUTPATIENT
Start: 2019-05-30 | End: 2019-05-30

## 2019-05-30 RX ORDER — DIPHENHYDRAMINE HCL 25 MG
25 CAPSULE ORAL ONCE
Status: COMPLETED | OUTPATIENT
Start: 2019-05-30 | End: 2019-05-30

## 2019-05-30 RX ADMIN — SODIUM CHLORIDE 1 G: 900 INJECTION INTRAVENOUS at 05:25

## 2019-05-30 RX ADMIN — VANCOMYCIN HYDROCHLORIDE 750 MG: 10 INJECTION, POWDER, LYOPHILIZED, FOR SOLUTION INTRAVENOUS at 20:36

## 2019-05-30 RX ADMIN — SODIUM BICARBONATE 650 MG: 650 TABLET ORAL at 20:36

## 2019-05-30 RX ADMIN — MEGESTROL ACETATE 800 MG: 40 SUSPENSION ORAL at 08:58

## 2019-05-30 RX ADMIN — AMLODIPINE BESYLATE 5 MG: 5 TABLET ORAL at 08:58

## 2019-05-30 RX ADMIN — SODIUM CHLORIDE 1 G: 900 INJECTION INTRAVENOUS at 21:44

## 2019-05-30 RX ADMIN — ZOLPIDEM TARTRATE 5 MG: 5 TABLET ORAL at 22:43

## 2019-05-30 RX ADMIN — SODIUM BICARBONATE 650 MG: 650 TABLET ORAL at 08:58

## 2019-05-30 RX ADMIN — TAMSULOSIN HYDROCHLORIDE 0.4 MG: 0.4 CAPSULE ORAL at 20:36

## 2019-05-30 RX ADMIN — SUCRALFATE 1 G: 1 SUSPENSION ORAL at 08:58

## 2019-05-30 RX ADMIN — SODIUM CHLORIDE, PRESERVATIVE FREE 3 ML: 5 INJECTION INTRAVENOUS at 20:37

## 2019-05-30 RX ADMIN — SODIUM CHLORIDE 100 ML/HR: 9 INJECTION, SOLUTION INTRAVENOUS at 08:57

## 2019-05-30 RX ADMIN — ACETAMINOPHEN 650 MG: 325 TABLET, FILM COATED ORAL at 10:09

## 2019-05-30 RX ADMIN — DIPHENHYDRAMINE HYDROCHLORIDE 25 MG: 25 CAPSULE ORAL at 10:09

## 2019-05-30 RX ADMIN — SUCRALFATE 1 G: 1 SUSPENSION ORAL at 20:36

## 2019-05-30 RX ADMIN — SODIUM CHLORIDE, PRESERVATIVE FREE 3 ML: 5 INJECTION INTRAVENOUS at 08:58

## 2019-05-30 RX ADMIN — SUCRALFATE 1 G: 1 SUSPENSION ORAL at 11:59

## 2019-05-30 RX ADMIN — ROPINIROLE HYDROCHLORIDE 0.5 MG: 0.25 TABLET, FILM COATED ORAL at 20:36

## 2019-05-30 RX ADMIN — Medication 5.25 MG: at 21:44

## 2019-05-30 RX ADMIN — FUROSEMIDE 20 MG: 10 INJECTION, SOLUTION INTRAVENOUS at 13:04

## 2019-05-30 RX ADMIN — ZOLPIDEM TARTRATE 5 MG: 5 TABLET ORAL at 00:27

## 2019-05-30 RX ADMIN — PANTOPRAZOLE SODIUM 40 MG: 40 TABLET, DELAYED RELEASE ORAL at 05:25

## 2019-05-30 RX ADMIN — SODIUM CHLORIDE 1 G: 900 INJECTION INTRAVENOUS at 13:04

## 2019-05-30 RX ADMIN — HYDROCORTISONE SODIUM SUCCINATE 100 MG: 100 INJECTION, POWDER, FOR SOLUTION INTRAMUSCULAR; INTRAVENOUS at 10:09

## 2019-05-31 ENCOUNTER — APPOINTMENT (OUTPATIENT)
Dept: CARDIOLOGY | Facility: HOSPITAL | Age: 79
End: 2019-05-31

## 2019-05-31 PROBLEM — I48.91 ATRIAL FIBRILLATION WITH RAPID VENTRICULAR RESPONSE (HCC): Status: ACTIVE | Noted: 2019-05-31

## 2019-05-31 LAB
ABO + RH BLD: NORMAL
ABO + RH BLD: NORMAL
ALBUMIN SERPL-MCNC: 3.2 G/DL (ref 3.5–5)
ALBUMIN/GLOB SERPL: 0.9 G/DL (ref 1.1–2.5)
ALP SERPL-CCNC: 93 U/L (ref 24–120)
ALT SERPL W P-5'-P-CCNC: 36 U/L (ref 0–54)
ANION GAP SERPL CALCULATED.3IONS-SCNC: 7 MMOL/L (ref 4–13)
ANISOCYTOSIS BLD QL: ABNORMAL
AST SERPL-CCNC: 47 U/L (ref 7–45)
BASOPHILS # BLD MANUAL: 0.02 10*3/MM3 (ref 0–0.2)
BASOPHILS NFR BLD AUTO: 1 % (ref 0–2)
BH BB BLOOD EXPIRATION DATE: NORMAL
BH BB BLOOD EXPIRATION DATE: NORMAL
BH BB BLOOD TYPE BARCODE: 5100
BH BB BLOOD TYPE BARCODE: 5100
BH BB DISPENSE STATUS: NORMAL
BH BB DISPENSE STATUS: NORMAL
BH BB PRODUCT CODE: NORMAL
BH BB PRODUCT CODE: NORMAL
BH BB UNIT NUMBER: NORMAL
BH BB UNIT NUMBER: NORMAL
BH CV ECHO MEAS - AO MAX PG (FULL): 6.7 MMHG
BH CV ECHO MEAS - AO MAX PG: 8.4 MMHG
BH CV ECHO MEAS - AO MEAN PG (FULL): 5 MMHG
BH CV ECHO MEAS - AO MEAN PG: 6 MMHG
BH CV ECHO MEAS - AO ROOT AREA (BSA CORRECTED): 2.2
BH CV ECHO MEAS - AO ROOT AREA: 10.8 CM^2
BH CV ECHO MEAS - AO ROOT DIAM: 3.7 CM
BH CV ECHO MEAS - AO V2 MAX: 145 CM/SEC
BH CV ECHO MEAS - AO V2 MEAN: 111 CM/SEC
BH CV ECHO MEAS - AO V2 VTI: 29.6 CM
BH CV ECHO MEAS - AVA(I,A): 3.2 CM^2
BH CV ECHO MEAS - AVA(I,D): 3.2 CM^2
BH CV ECHO MEAS - AVA(V,A): 2.2 CM^2
BH CV ECHO MEAS - AVA(V,D): 2.2 CM^2
BH CV ECHO MEAS - BSA(HAYCOCK): 1.7 M^2
BH CV ECHO MEAS - BSA: 1.7 M^2
BH CV ECHO MEAS - BZI_BMI: 23.7 KILOGRAMS/M^2
BH CV ECHO MEAS - BZI_METRIC_HEIGHT: 162.6 CM
BH CV ECHO MEAS - BZI_METRIC_WEIGHT: 62.6 KG
BH CV ECHO MEAS - EDV(CUBED): 73.6 ML
BH CV ECHO MEAS - EDV(MOD-SP4): 101 ML
BH CV ECHO MEAS - EDV(TEICH): 78.1 ML
BH CV ECHO MEAS - EF(CUBED): 33.3 %
BH CV ECHO MEAS - EF(MOD-SP4): 40.5 %
BH CV ECHO MEAS - EF(TEICH): 27.5 %
BH CV ECHO MEAS - ESV(CUBED): 49 ML
BH CV ECHO MEAS - ESV(MOD-SP4): 60.1 ML
BH CV ECHO MEAS - ESV(TEICH): 56.6 ML
BH CV ECHO MEAS - FS: 12.6 %
BH CV ECHO MEAS - IVS/LVPW: 1.4
BH CV ECHO MEAS - IVSD: 1.4 CM
BH CV ECHO MEAS - LA DIMENSION: 3.8 CM
BH CV ECHO MEAS - LA/AO: 1
BH CV ECHO MEAS - LAT PEAK E' VEL: 12.1 CM/SEC
BH CV ECHO MEAS - LV DIASTOLIC VOL/BSA (35-75): 60.5 ML/M^2
BH CV ECHO MEAS - LV MASS(C)D: 180.8 GRAMS
BH CV ECHO MEAS - LV MASS(C)DI: 108.2 GRAMS/M^2
BH CV ECHO MEAS - LV MAX PG: 1.7 MMHG
BH CV ECHO MEAS - LV MEAN PG: 1 MMHG
BH CV ECHO MEAS - LV SYSTOLIC VOL/BSA (12-30): 36 ML/M^2
BH CV ECHO MEAS - LV V1 MAX: 65.8 CM/SEC
BH CV ECHO MEAS - LV V1 MEAN: 49.6 CM/SEC
BH CV ECHO MEAS - LV V1 VTI: 19.3 CM
BH CV ECHO MEAS - LVIDD: 4.2 CM
BH CV ECHO MEAS - LVIDS: 3.7 CM
BH CV ECHO MEAS - LVLD AP4: 8.6 CM
BH CV ECHO MEAS - LVLS AP4: 7.6 CM
BH CV ECHO MEAS - LVOT AREA (M): 4.9 CM^2
BH CV ECHO MEAS - LVOT AREA: 4.9 CM^2
BH CV ECHO MEAS - LVOT DIAM: 2.5 CM
BH CV ECHO MEAS - LVPWD: 1 CM
BH CV ECHO MEAS - MED PEAK E' VEL: 14.7 CM/SEC
BH CV ECHO MEAS - MV A MAX VEL: 130 CM/SEC
BH CV ECHO MEAS - MV DEC SLOPE: 427 CM/SEC^2
BH CV ECHO MEAS - MV DEC TIME: 0.17 SEC
BH CV ECHO MEAS - MV E MAX VEL: 72.2 CM/SEC
BH CV ECHO MEAS - MV E/A: 0.56
BH CV ECHO MEAS - RAP SYSTOLE: 5 MMHG
BH CV ECHO MEAS - RVSP: 42.9 MMHG
BH CV ECHO MEAS - SI(AO): 190.5 ML/M^2
BH CV ECHO MEAS - SI(CUBED): 14.7 ML/M^2
BH CV ECHO MEAS - SI(LVOT): 56.7 ML/M^2
BH CV ECHO MEAS - SI(MOD-SP4): 24.5 ML/M^2
BH CV ECHO MEAS - SI(TEICH): 12.9 ML/M^2
BH CV ECHO MEAS - SV(AO): 318.3 ML
BH CV ECHO MEAS - SV(CUBED): 24.5 ML
BH CV ECHO MEAS - SV(LVOT): 94.7 ML
BH CV ECHO MEAS - SV(MOD-SP4): 40.9 ML
BH CV ECHO MEAS - SV(TEICH): 21.5 ML
BH CV ECHO MEAS - TR MAX VEL: 308 CM/SEC
BH CV ECHO MEASUREMENTS AVERAGE E/E' RATIO: 5.39
BILIRUB SERPL-MCNC: 0.7 MG/DL (ref 0.1–1)
BUN BLD-MCNC: 40 MG/DL (ref 5–21)
BUN/CREAT SERPL: 27.2 (ref 7–25)
CALCIUM SPEC-SCNC: 9.3 MG/DL (ref 8.4–10.4)
CHLORIDE SERPL-SCNC: 109 MMOL/L (ref 98–110)
CO2 SERPL-SCNC: 21 MMOL/L (ref 24–31)
CREAT BLD-MCNC: 1.47 MG/DL (ref 0.5–1.4)
DEPRECATED RDW RBC AUTO: 44.6 FL (ref 40–54)
DOHLE BODIES: PRESENT
EOSINOPHIL # BLD MANUAL: 0.05 10*3/MM3 (ref 0–0.7)
EOSINOPHIL NFR BLD MANUAL: 3.1 % (ref 0–4)
ERYTHROCYTE [DISTWIDTH] IN BLOOD BY AUTOMATED COUNT: 17.3 % (ref 12–15)
GFR SERPL CREATININE-BSD FRML MDRD: 47 ML/MIN/1.73
GLOBULIN UR ELPH-MCNC: 3.5 GM/DL
GLUCOSE BLD-MCNC: 94 MG/DL (ref 70–100)
HCT VFR BLD AUTO: 24.6 % (ref 40–52)
HGB BLD-MCNC: 8.5 G/DL (ref 14–18)
LEFT ATRIUM VOLUME INDEX: 39.7 ML/M2
LEFT ATRIUM VOLUME: 66.3 CM3
LYMPHOCYTES # BLD MANUAL: 0.54 10*3/MM3 (ref 0.72–4.86)
LYMPHOCYTES NFR BLD MANUAL: 3.1 % (ref 4–12)
LYMPHOCYTES NFR BLD MANUAL: 35.1 % (ref 15–45)
MAGNESIUM SERPL-MCNC: 1.6 MG/DL (ref 1.4–2.2)
MAXIMAL PREDICTED HEART RATE: 144 BPM
MCH RBC QN AUTO: 28 PG (ref 28–32)
MCHC RBC AUTO-ENTMCNC: 34.6 G/DL (ref 33–36)
MCV RBC AUTO: 80.9 FL (ref 82–95)
METAMYELOCYTES NFR BLD MANUAL: 1 % (ref 0–0)
MICROCYTES BLD QL: ABNORMAL
MONOCYTES # BLD AUTO: 0.05 10*3/MM3 (ref 0.19–1.3)
NEUTROPHILS # BLD AUTO: 0.81 10*3/MM3 (ref 1.87–8.4)
NEUTROPHILS NFR BLD MANUAL: 41.2 % (ref 39–78)
NEUTS BAND NFR BLD MANUAL: 11.3 % (ref 0–10)
PLATELET # BLD AUTO: 68 10*3/MM3 (ref 130–400)
PMV BLD AUTO: 11.2 FL (ref 6–12)
POLYCHROMASIA BLD QL SMEAR: ABNORMAL
POTASSIUM BLD-SCNC: 3.5 MMOL/L (ref 3.5–5.3)
PROT SERPL-MCNC: 6.7 G/DL (ref 6.3–8.7)
RBC # BLD AUTO: 3.04 10*6/MM3 (ref 4.8–5.9)
SMALL PLATELETS BLD QL SMEAR: ABNORMAL
SODIUM BLD-SCNC: 137 MMOL/L (ref 135–145)
STRESS TARGET HR: 122 BPM
TROPONIN I SERPL-MCNC: <0.012 NG/ML (ref 0–0.03)
UNIT  ABO: NORMAL
UNIT  ABO: NORMAL
UNIT  RH: NORMAL
UNIT  RH: NORMAL
VANCOMYCIN TROUGH SERPL-MCNC: 10.05 MCG/ML (ref 10–20)
VARIANT LYMPHS NFR BLD MANUAL: 4.1 % (ref 0–5)
WBC NRBC COR # BLD: 1.55 10*3/MM3 (ref 4.8–10.8)

## 2019-05-31 PROCEDURE — 85007 BL SMEAR W/DIFF WBC COUNT: CPT | Performed by: PHYSICIAN ASSISTANT

## 2019-05-31 PROCEDURE — 0399T HC MYOCARDL STRAIN IMAG QUAN ASSMT PER SESS: CPT

## 2019-05-31 PROCEDURE — 83735 ASSAY OF MAGNESIUM: CPT | Performed by: INTERNAL MEDICINE

## 2019-05-31 PROCEDURE — 85025 COMPLETE CBC W/AUTO DIFF WBC: CPT | Performed by: PHYSICIAN ASSISTANT

## 2019-05-31 PROCEDURE — 99222 1ST HOSP IP/OBS MODERATE 55: CPT | Performed by: NURSE PRACTITIONER

## 2019-05-31 PROCEDURE — 93306 TTE W/DOPPLER COMPLETE: CPT

## 2019-05-31 PROCEDURE — 25010000002 VANCOMYCIN 10 G RECONSTITUTED SOLUTION: Performed by: NURSE PRACTITIONER

## 2019-05-31 PROCEDURE — 0399T ADULT TRANSTHORACIC ECHO COMPLETE W/ CONT IF NECESSARY PER PROTOCOL: CPT | Performed by: INTERNAL MEDICINE

## 2019-05-31 PROCEDURE — 84484 ASSAY OF TROPONIN QUANT: CPT | Performed by: INTERNAL MEDICINE

## 2019-05-31 PROCEDURE — 80053 COMPREHEN METABOLIC PANEL: CPT | Performed by: FAMILY MEDICINE

## 2019-05-31 PROCEDURE — 93005 ELECTROCARDIOGRAM TRACING: CPT | Performed by: INTERNAL MEDICINE

## 2019-05-31 PROCEDURE — 80202 ASSAY OF VANCOMYCIN: CPT | Performed by: NURSE PRACTITIONER

## 2019-05-31 PROCEDURE — 93010 ELECTROCARDIOGRAM REPORT: CPT | Performed by: INTERNAL MEDICINE

## 2019-05-31 PROCEDURE — 93306 TTE W/DOPPLER COMPLETE: CPT | Performed by: INTERNAL MEDICINE

## 2019-05-31 PROCEDURE — 25010000002 AMIODARONE IN DEXTROSE 5% 360-4.14 MG/200ML-% SOLUTION: Performed by: INTERNAL MEDICINE

## 2019-05-31 RX ORDER — METOPROLOL TARTRATE 5 MG/5ML
5 INJECTION INTRAVENOUS
Status: DISCONTINUED | OUTPATIENT
Start: 2019-05-31 | End: 2019-06-01 | Stop reason: HOSPADM

## 2019-05-31 RX ORDER — CARVEDILOL 6.25 MG/1
6.25 TABLET ORAL EVERY 12 HOURS SCHEDULED
Status: DISCONTINUED | OUTPATIENT
Start: 2019-05-31 | End: 2019-05-31

## 2019-05-31 RX ORDER — DILTIAZEM HYDROCHLORIDE 5 MG/ML
20 INJECTION INTRAVENOUS ONCE
Status: COMPLETED | OUTPATIENT
Start: 2019-05-31 | End: 2019-05-31

## 2019-05-31 RX ORDER — FLECAINIDE ACETATE 50 MG/1
50 TABLET ORAL EVERY 12 HOURS SCHEDULED
Status: DISCONTINUED | OUTPATIENT
Start: 2019-05-31 | End: 2019-06-01 | Stop reason: HOSPADM

## 2019-05-31 RX ADMIN — DILTIAZEM HYDROCHLORIDE 20 MG: 5 INJECTION INTRAVENOUS at 03:53

## 2019-05-31 RX ADMIN — SODIUM BICARBONATE 650 MG: 650 TABLET ORAL at 10:01

## 2019-05-31 RX ADMIN — MEGESTROL ACETATE 800 MG: 40 SUSPENSION ORAL at 10:01

## 2019-05-31 RX ADMIN — SODIUM CHLORIDE 75 ML/HR: 9 INJECTION, SOLUTION INTRAVENOUS at 10:02

## 2019-05-31 RX ADMIN — SUCRALFATE 1 G: 1 SUSPENSION ORAL at 10:01

## 2019-05-31 RX ADMIN — TAMSULOSIN HYDROCHLORIDE 0.4 MG: 0.4 CAPSULE ORAL at 20:23

## 2019-05-31 RX ADMIN — FLECAINIDE ACETATE 50 MG: 50 TABLET ORAL at 13:03

## 2019-05-31 RX ADMIN — SUCRALFATE 1 G: 1 SUSPENSION ORAL at 06:01

## 2019-05-31 RX ADMIN — ROPINIROLE HYDROCHLORIDE 0.5 MG: 0.25 TABLET, FILM COATED ORAL at 20:23

## 2019-05-31 RX ADMIN — DILTIAZEM HYDROCHLORIDE 5 MG/HR: 5 INJECTION INTRAVENOUS at 03:55

## 2019-05-31 RX ADMIN — SODIUM CHLORIDE, PRESERVATIVE FREE 3 ML: 5 INJECTION INTRAVENOUS at 20:24

## 2019-05-31 RX ADMIN — PANTOPRAZOLE SODIUM 40 MG: 40 TABLET, DELAYED RELEASE ORAL at 06:01

## 2019-05-31 RX ADMIN — FLECAINIDE ACETATE 50 MG: 50 TABLET ORAL at 20:23

## 2019-05-31 RX ADMIN — ZOLPIDEM TARTRATE 5 MG: 5 TABLET ORAL at 22:47

## 2019-05-31 RX ADMIN — SUCRALFATE 1 G: 1 SUSPENSION ORAL at 17:36

## 2019-05-31 RX ADMIN — SODIUM CHLORIDE 1 G: 900 INJECTION INTRAVENOUS at 13:03

## 2019-05-31 RX ADMIN — SODIUM CHLORIDE 1 G: 900 INJECTION INTRAVENOUS at 04:41

## 2019-05-31 RX ADMIN — SODIUM BICARBONATE 650 MG: 650 TABLET ORAL at 20:23

## 2019-05-31 RX ADMIN — METOPROLOL TARTRATE 12.5 MG: 25 TABLET, FILM COATED ORAL at 13:03

## 2019-05-31 RX ADMIN — Medication 5.25 MG: at 22:03

## 2019-05-31 RX ADMIN — METOPROLOL TARTRATE 12.5 MG: 25 TABLET, FILM COATED ORAL at 20:23

## 2019-05-31 RX ADMIN — SODIUM CHLORIDE 1 G: 900 INJECTION INTRAVENOUS at 22:03

## 2019-05-31 RX ADMIN — AMLODIPINE BESYLATE 5 MG: 5 TABLET ORAL at 10:01

## 2019-05-31 RX ADMIN — SUCRALFATE 1 G: 1 SUSPENSION ORAL at 20:23

## 2019-05-31 RX ADMIN — AMIODARONE HYDROCHLORIDE 1 MG/MIN: 1.8 INJECTION, SOLUTION INTRAVENOUS at 06:36

## 2019-05-31 RX ADMIN — VANCOMYCIN HYDROCHLORIDE 750 MG: 10 INJECTION, POWDER, LYOPHILIZED, FOR SOLUTION INTRAVENOUS at 20:23

## 2019-05-31 RX ADMIN — ROPINIROLE HYDROCHLORIDE 0.5 MG: 0.25 TABLET, FILM COATED ORAL at 10:01

## 2019-06-01 VITALS
HEART RATE: 100 BPM | RESPIRATION RATE: 18 BRPM | HEIGHT: 64 IN | SYSTOLIC BLOOD PRESSURE: 118 MMHG | BODY MASS INDEX: 23.56 KG/M2 | OXYGEN SATURATION: 96 % | TEMPERATURE: 97.9 F | DIASTOLIC BLOOD PRESSURE: 69 MMHG | WEIGHT: 138.01 LBS

## 2019-06-01 LAB
ALBUMIN SERPL-MCNC: 2.6 G/DL (ref 3.5–5)
ALBUMIN/GLOB SERPL: 0.9 G/DL (ref 1.1–2.5)
ALP SERPL-CCNC: 86 U/L (ref 24–120)
ALT SERPL W P-5'-P-CCNC: 40 U/L (ref 0–54)
ANION GAP SERPL CALCULATED.3IONS-SCNC: 7 MMOL/L (ref 4–13)
ANISOCYTOSIS BLD QL: ABNORMAL
AST SERPL-CCNC: 36 U/L (ref 7–45)
BILIRUB SERPL-MCNC: 0.3 MG/DL (ref 0.1–1)
BUN BLD-MCNC: 33 MG/DL (ref 5–21)
BUN/CREAT SERPL: 26.6 (ref 7–25)
CALCIUM SPEC-SCNC: 9 MG/DL (ref 8.4–10.4)
CHLORIDE SERPL-SCNC: 110 MMOL/L (ref 98–110)
CO2 SERPL-SCNC: 22 MMOL/L (ref 24–31)
CREAT BLD-MCNC: 1.24 MG/DL (ref 0.5–1.4)
DEPRECATED RDW RBC AUTO: 48.4 FL (ref 40–54)
DOHLE BODIES: PRESENT
ERYTHROCYTE [DISTWIDTH] IN BLOOD BY AUTOMATED COUNT: 17.8 % (ref 12–15)
GFR SERPL CREATININE-BSD FRML MDRD: 57 ML/MIN/1.73
GIANT PLATELETS: ABNORMAL
GLOBULIN UR ELPH-MCNC: 2.9 GM/DL
GLUCOSE BLD-MCNC: 84 MG/DL (ref 70–100)
HCT VFR BLD AUTO: 25.3 % (ref 40–52)
HGB BLD-MCNC: 8.2 G/DL (ref 14–18)
HYPOCHROMIA BLD QL: ABNORMAL
LYMPHOCYTES # BLD MANUAL: 0.58 10*3/MM3 (ref 0.72–4.86)
LYMPHOCYTES NFR BLD MANUAL: 1 % (ref 4–12)
LYMPHOCYTES NFR BLD MANUAL: 30.3 % (ref 15–45)
MCH RBC QN AUTO: 27.5 PG (ref 28–32)
MCHC RBC AUTO-ENTMCNC: 32.4 G/DL (ref 33–36)
MCV RBC AUTO: 84.9 FL (ref 82–95)
MONOCYTES # BLD AUTO: 0.02 10*3/MM3 (ref 0.19–1.3)
NEUTROPHILS # BLD AUTO: 1.33 10*3/MM3 (ref 1.87–8.4)
NEUTROPHILS NFR BLD MANUAL: 65.7 % (ref 39–78)
NEUTS BAND NFR BLD MANUAL: 3 % (ref 0–10)
PLATELET # BLD AUTO: 59 10*3/MM3 (ref 130–400)
PMV BLD AUTO: 12 FL (ref 6–12)
POIKILOCYTOSIS BLD QL SMEAR: ABNORMAL
POTASSIUM BLD-SCNC: 4 MMOL/L (ref 3.5–5.3)
PROT SERPL-MCNC: 5.5 G/DL (ref 6.3–8.7)
RBC # BLD AUTO: 2.98 10*6/MM3 (ref 4.8–5.9)
SMALL PLATELETS BLD QL SMEAR: ABNORMAL
SODIUM BLD-SCNC: 139 MMOL/L (ref 135–145)
WBC NRBC COR # BLD: 1.93 10*3/MM3 (ref 4.8–10.8)

## 2019-06-01 PROCEDURE — 80053 COMPREHEN METABOLIC PANEL: CPT | Performed by: FAMILY MEDICINE

## 2019-06-01 PROCEDURE — 25010000002 FILGRASTIM 480 MCG/0.8ML SOLUTION PREFILLED SYRINGE: Performed by: INTERNAL MEDICINE

## 2019-06-01 PROCEDURE — 85025 COMPLETE CBC W/AUTO DIFF WBC: CPT | Performed by: PHYSICIAN ASSISTANT

## 2019-06-01 PROCEDURE — 85007 BL SMEAR W/DIFF WBC COUNT: CPT | Performed by: PHYSICIAN ASSISTANT

## 2019-06-01 RX ORDER — LEVOFLOXACIN 250 MG/1
250 TABLET ORAL DAILY
Qty: 5 TABLET | Refills: 0 | Status: SHIPPED | OUTPATIENT
Start: 2019-06-01 | End: 2019-06-06

## 2019-06-01 RX ORDER — FLECAINIDE ACETATE 50 MG/1
50 TABLET ORAL EVERY 12 HOURS SCHEDULED
Qty: 60 TABLET | Refills: 2 | Status: SHIPPED | OUTPATIENT
Start: 2019-06-01 | End: 2021-08-25

## 2019-06-01 RX ADMIN — MEGESTROL ACETATE 800 MG: 40 SUSPENSION ORAL at 09:28

## 2019-06-01 RX ADMIN — FILGRASTIM 480 MCG: 480 INJECTION, SOLUTION INTRAVENOUS; SUBCUTANEOUS at 11:43

## 2019-06-01 RX ADMIN — METOPROLOL TARTRATE 12.5 MG: 25 TABLET, FILM COATED ORAL at 09:29

## 2019-06-01 RX ADMIN — SUCRALFATE 1 G: 1 SUSPENSION ORAL at 06:08

## 2019-06-01 RX ADMIN — SODIUM CHLORIDE, PRESERVATIVE FREE 3 ML: 5 INJECTION INTRAVENOUS at 09:30

## 2019-06-01 RX ADMIN — PANTOPRAZOLE SODIUM 40 MG: 40 TABLET, DELAYED RELEASE ORAL at 06:08

## 2019-06-01 RX ADMIN — FLECAINIDE ACETATE 50 MG: 50 TABLET ORAL at 09:29

## 2019-06-01 RX ADMIN — ROPINIROLE HYDROCHLORIDE 0.5 MG: 0.25 TABLET, FILM COATED ORAL at 09:29

## 2019-06-01 RX ADMIN — SODIUM CHLORIDE 1 G: 900 INJECTION INTRAVENOUS at 06:08

## 2019-06-01 RX ADMIN — SODIUM BICARBONATE 650 MG: 650 TABLET ORAL at 09:29

## 2019-06-01 NOTE — PLAN OF CARE
Problem: Patient Care Overview  Goal: Plan of Care Review  Outcome: Outcome(s) achieved Date Met: 06/01/19    Goal: Discharge Needs Assessment  Outcome: Outcome(s) achieved Date Met: 06/01/19    Goal: Interprofessional Rounds/Family Conf  Outcome: Outcome(s) achieved Date Met: 06/01/19      Problem: Skin Injury Risk (Adult)  Goal: Skin Health and Integrity  Outcome: Outcome(s) achieved Date Met: 06/01/19      Problem: Infection, Risk/Actual (Adult)  Goal: Infection Prevention/Resolution  Outcome: Outcome(s) achieved Date Met: 06/01/19      Problem: Fall Risk (Adult)  Goal: Absence of Fall  Outcome: Outcome(s) achieved Date Met: 06/01/19

## 2019-06-01 NOTE — PLAN OF CARE
Problem: Patient Care Overview  Goal: Plan of Care Review  Outcome: Ongoing (interventions implemented as appropriate)   05/31/19 4312   Coping/Psychosocial   Plan of Care Reviewed With patient   Plan of Care Review   Progress improving   OTHER   Outcome Summary Afib converted to NSR with Amiodarone, cardio consult with meds added. No further dysrhthmia. No complaints. Continue to monitor.      Goal: Individualization and Mutuality  Outcome: Ongoing (interventions implemented as appropriate)    Goal: Discharge Needs Assessment  Outcome: Ongoing (interventions implemented as appropriate)    Goal: Interprofessional Rounds/Family Conf  Outcome: Ongoing (interventions implemented as appropriate)      Problem: Skin Injury Risk (Adult)  Goal: Skin Health and Integrity  Outcome: Ongoing (interventions implemented as appropriate)      Problem: Infection, Risk/Actual (Adult)  Goal: Infection Prevention/Resolution  Outcome: Ongoing (interventions implemented as appropriate)      Problem: Fall Risk (Adult)  Goal: Absence of Fall  Outcome: Ongoing (interventions implemented as appropriate)      Problem: Arrhythmia/Dysrhythmia (Symptomatic) (Adult)  Goal: Signs and Symptoms of Listed Potential Problems Will be Absent, Minimized or Managed (Arrhythmia/Dysrhythmia)  Outcome: Outcome(s) achieved Date Met: 05/31/19

## 2019-06-01 NOTE — PROGRESS NOTES
PROGRESS NOTE  Patient name: Karoline Prater  Patient : 1940  VISIT # [id number]  MR #[id number]   Room 331    SUBJECTIVE: Feels well. Afebrile.     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  He did receive Neulasta injection via on body auto injector on 2019.  He awakened on 2019 with significant weakness.  He had fever of 100.1.  He denied any chills.  He presented to the emergency room and was found to be neutropenic and admitted with neutropenic fever.     TUMOR HISTORY: Adenocarcinoma on the RUL of the lung 2018  Karoline had CT scans performed for new onset of weight loss of 13 pounds over the previous year , associated with increased fatigue.   He denied respiratory symptoms of shortness of air, cough or productive sputum.  A CT scan of the chest on 2018 had been ordered by Dr. Irving Alexis due to weight loss and identified a new lobulated right upper lobe 5.7 cm mass that extended back to the right hilum.   Numerous mediastinal lymph nodes ranging in size from mm to 1.3 cm and a subcarnial lymph node that measured 1.5 x 1.5 x 3.5 cm.  A CT scan of the abdomen and pelvis with contrast on 2018 documented a 4.9 x 4 x 4 cm soft tissue mass with peripheral calcification immediately adjacent to the gallbladder in the head of the pancreas area.  An MRI of the abdomen and pelvis W & WO on 10/11/2018 identified in the 4.1 x 3.4 cm soft tissue mass in the subhepatic space, abutting the second portion of the duodenum with mild displacement of the duodenum. There does not appear to be involvement of the pancreas, and the pancreas appears within normal limits.  Differential diagnoses include a desmoid tumor, less likely leiomyoma of the wall of the duodenum or malignancy.    Dr. Michele requested a CT-guided biopsy of the right upper lobe mass.   Dr. Rosales, the radiologist, evaluated the area with a repeat CT scan of the chest on 10/11/2018.   He spoke with Dr. Michele indicating that he would not be able to perform the biopsy because the tumor was not accessible, it was under the scapula , which blocked access and therefore the biopsy procedure was canceled.  On the CT scan of the chest on 10/11/2018 measurements of the RUL lung mass was 5.7 x 3.2 cm with irregular margins suspicious for a primary lung neoplasm. Soft tissue associated with the tumor appeared to extend into the bronchus supplying this portion of the RUL of the lung.   Dr. Rosales indicated that the mass had an endobronchial component and should be easily assessable via bronchoscopy.  The chest CT also included a portion of the upper abdomen where a soft tissue mass was described in an addendum report. In the subhepatic space measuring 4.0 x 3.9 cm, displacing the second portion of the duodenum medially.  A referral was made to Dr. Becerra for consideration for bronchoscopy for biopsy.  On 10/24/2018, Dr. Gareth Becerra performed a bronchoscopy with EBUS guided biopsy of a 3 cm subcarinal lymph node along with bronchoalveolar lavage of the posterior segment of the RUL of the lung.  The final pathology of the station 7 lymph node only revealed a background of small mature lymphocytes with clusters of histiocytes suggestive of granuloma.  Negative for malignant cells.  Kinyoun and GMS stains were negative for AFB and fungal organisms respectively.  The bronchial washings were negative for malignant cells as well.   Mr. Prater was referred to Dr. Meenakshi Polanco at [facility] in [place], [place], for navigational bronchoscopy and biopsy under ultrasound.  On 11/27/2018 Dr. Meenakshi Polanco performed a bronchoscopy, navigational protocol, endobronchial ultrasound and biopsy of the RUL of the lung mass along  with multiple lymph node station Biopsies.  Pathology revealed the following:  · Poorly differentiated Adenocarcinoma from the RUL of the lung mass on biopsy and bronchoalveolar lavage consistent with a lung primary.   · All lymph nodes sampled were NEGATIVE for malignant cells including stations 10L, 4L, station 7, 10R, 4R.   · IHC studies were positive for CK7, TTF-1 and Napsin A.   · IHC studies on tumor cells were negative for CDX2, CK5/6, and p63   · Further lung profile molecular testing is pending  All of the above was discussed at length with Mr. Prater at his 12/13/2018 clinic visit.   He was agreeable for referral for consideration of resection of the lung lesion.   He is comfortable with and would like a referral to Dr. Ulysses Bardales ([phone]) at [facility], [address] [place] [address], [address], [place] [id number].  Logistics, however, are planning a big part in his decision making and he may decide to have surgery done in the [place] area.  If he decides to have surgery in the [place], referral will be made to Dr. Frandy Patel.   CT chest with contrast 2/18/2019 at [facility] compared to 9/12/2018 revealed a 4.9 x 3.5 cm spiculated RUL lung mass which actually decreased in size from a prior value of 6.1 x 3.6 cm.   Subhepatic mass adjacent to the descending duodenum had increased in size significantly to 4.3 x 9 cm compared to 4.1 x 3.4 cm on the 10/11/18 MRI of the abdomen.  CT of the abdomen and pelvis without contrast on 3/20/2019 at [facility] was compared to outpatient CT data and pelvis on 9/12/2018 an MRI of the abdomen from 10/11/2018. A soft tissue mass was again encountered in the subhepatic space which abutted the distal stomach and proximal duodenum measuring 8.9 x 5.4 cm which has increased considerably from a prior measurement of 4.1 x 3.4 cm. Medial to the left renal hilum a 3.5 cm lobular mass causing some mild left-sided hydronephrosis.  Mr. Prater was referred to Dr. Frandy Patel  who saw him on 1/22/2019 anticipating plans for a curative resection.   Dr. Michele received a phone call on 2/20/2019 from Dr. Patel , indicating that the previously documented an abdominal pancreatic area mass had doubled in size and was causing compression into the left kidney/renal pelvis producing a hydroureter.   Dr. Patel arranged a referral to Dr. Sylvester who will be placing a stent 3/8/2019.  Mr. Prater was scheduled to be seen by gastroenterology at [facility] on 3/13/2019 for EGD/EUS assessment and biopsy of the enlarging peripancreatic/duodenal area mass.  With a decrease in the lung mass and increased size of the subhepatic mass-surgical resection was abandoned at present pending further evaluation of the subhepatic mass.  Dr. Michele discussed treatment options with the unresectable lung mass and the per he pancreatic mass and recommended a combination of Keytruda, Alimta, carboplatin.  He was subsequently admitted to [facility] 4/26 - 4/30/2019 with abdominal pain and melena. WBC pinky was 0.75 with ANC 0.34. PLT did drop to 24,000. He did have duodenal ulcerations that were bleeding on endoscopy. He was stabilized but then readmitted from 5/8 - 5/10/2019 with recurrent melanoma. A repeat endoscopy was performed. It was felt that exacerbation of his bleeding from the duodenal came about by his thrombocytopenia from treatment. Subsequent dosing was adjusted and Neulasta was added.     TREATMENT SUMMARY  1. Keytruda, carboplatin, Alimta initiated 4/18/2019      #2 TUMOR HISTORY: Pancreatic mass diagnosed 10/29/03  Mr. Prater presented in October 2003 with obstructive jaundice.   A CT scan of the abdomen on 10/26/2003 documented a 3.2 x 4 x 4.2 cm mass in the head of the pancreas.   On 10/29/03 Dr. Alexis performed a resection of a portion of the head of the pancreas on Fahad Prater along with a Klaudia-en-Y procedure and a choledochojejunostomy for what was felt to be a pancreatic cancer. His pancreas was  bypassed because of the findings.  Pathology of the biopsies were negative for malignancy showing a fibrosed pancreas.   Mr. Prater was referred to Dr. Vishal High in [place].  Dr. Vishal High performed ERCP with an EUS and biopsy on 02/26/04   Pathology again was negative for cancer or malignancy.   Routine periodic serologic and radiographic monitoring has not disclosed progression of the mass or development of new malignancy or increase in pancreatic tumor markers.   A CT scan of the abdomen and pelvis with contrast on 9/12/2018 documented a 4.9 x 4 x 4 cm soft tissue mass with peripheral calcification immediately adjacent to the gallbladder in the head of the pancreas area.  An MRI of the abdomen and pelvis W & WO on 10/11/2018 identified in the 4.1 x 3.4 cm soft tissue mass in the subhepatic space, abutting the second portion of the duodenum with mild displacement of the duodenum. There does not appear to be involvement of the pancreas, and the pancreas appears within normal limits.  Differential diagnoses include a desmoid tumor, less likely leiomyoma of the wall of the duodenum or malignancy.   CT of the abdomen and pelvis without contrast on 3/20/2019 at [facility] was compared to outpatient CT data and pelvis on 9/12/2018 an MRI of the abdomen from 10/11/2018. A soft tissue mass was again encountered in the subhepatic space which abutted the distal stomach and proximal duodenum measuring 8.9 x 5.4 cm which has increased considerably from a prior measurement of 4.1 x 3.4 cm. Medial to the left renal hilum a 3.5 cm lobular mass causing some mild left-sided hydronephrosis.  Mr. Prater was scheduled for an EGD/EUS by Dr. Bryson Moe as an outpatient procedure on 3/13/2019 for assessment and biopsy of the enlarging peripancreatic/duodenal area mass. Unfortunately, after initiation of the procedure, he began having ventricular tachycardia and the procedure had to be aborted. Karoline was transferred to the ICU for  cardiology evaluation and stabilization. He remained hospitalized until 3/18/2019 when he was medically cleared for discharge.  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 No emergent urologic intervention was warranted and he received monitoring of renal function. He had a creatinine level of 1.9 at discharge.  PET scan on 4/2/2019 identified a soft tissue mass in the RUL measuring 1.2 x 3.5 cm with extension to the right anterior hilum with an SUV of 10.1. Evidence of mediastinal and hilar lymphadenopathy, with low-level metabolic activity. Interval increase in the size of a large mass in the right upper abdomen inseparable from the duodenum measuring 10.7 x 6.9 cm compared to 5.4 x 8.9 cm on 2/20/2019 with an SUV of 23.6. Slight increase in 2 small inseparable masses medial to the hilum of the left kidney measuring 2.2 and 2.6 cm and the other measuring 3.9 cm with a maximum SUV of 18.6.  Cycle #1 of palliative chemotherapy with Carboplatin, Alimta and Keytruda was initiated 4/18/19 which should also cover this process.  Abdominal/pelvic CT 4/26/19 was performed at [facility] due to abdominal pain and melena. The RUQ mass, within the duodenum decreased to 6.8 x 6.2 cm?(was 10.7 x 6.9 cm on PET 4/2/19)     #1 HEMATOLOGICAL HISTORY: IgG kappa MGUS- Dx: 02/23/06.  During the course of Mr. Prater’s follow up, an abnormally elevated protein was noted on one occasion, which led to workup including quantitative immunoglobulins.   An elevated IgG kappa was encountered. This has remained stable in the 2500 range since early 2006.   A bone marrow biopsy and aspirate on 02/23/06 was negative with only 4% plasma cells.   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   24 hour urine for immunoelectrophoresis did not reveal an M-spike.  Serology studies on 9/18/2018 documented an elevated, stable IgG of 2589 with an M spike of  0.7.   Immunofixation continued to reveal IgG monoclonal protein with kappa light chain specificity.     #2 HEMATOLOGICAL HISTORY: Iron deficiency anemia, 9/18/2018  Karoline had serology on 9/18/2018 that identified iron deficiency anemia.   His lab work was obtained at [facility].  An iron level was 12, iron saturation 7%, ferritin 256  Folate >20, and vitamin B12 1044.   Dr. Li placed Karoline on ferrous sulfate 325 mg daily on 9/25/2018 , but this failed to resolve the anemia.  An EGD by Dr. Randy Somers on 12/11/2018 documented a normal esophagus, normal stomach and a degree of duodenal deformity.  Gastric biopsy was urease negative.  Colonoscopy by Dr. Randy Somers on 1/9/2019 documented a polyp at 80 cm.  Pathology identified a tubular adenoma, negative for high-grade dysplasia.   Feraheme administered.  Labs on 3/13/2019:  · Iron 25   · Iron saturation 22%   · TIBC 116   · Ferritin >2000   · Reticulocyte count 0.0578   ·    · Haptoglobin 341   · Folate >20   · Vitamin B12 945   · Erythropoietin 13  He presented to [facility] on 4/26/2019 with melena and abdominal discomfort. He was subsequently admitted  EGD per Dr. Jj on 4/29/2019 revealed  · LA grade (1 or more mucosal breaks greater than 5 mm, not extending between the tops of the 2 mucosal folds)?esophagitis with no bleeding found in the distal esophagus   · Stomach was normal, biopsies for H. pylori taken.   · Cardia and gastric fundus were normal on retroflexion   · One nonbleeding cratered duodenal ulcer with no stigmata of bleeding was found in the duodenal bulb lesion was about 9 mm.   · Many nonbleeding cratered, linear and superficial duodenal ulcers with no stigmata of bleeding were found in the second portion of the duodenum. The largest lesion was 6 mm in largest dimension. No mass encountered and anastomosis not seen.  He was admitted to [facility] on 5/8/2019 with melena, hematochezia, anemia, thrombocytopenia  Endoscopy  performed by Dr. Bansal on 5/9/2019 revealed  · Normal esophagus   · Gastric mucosal variant. 2 erythematous spots in the antrum, ? AVM   · Normal examined duodenum   · Nothing found to account for symptoms   He developed pancytopenia from chemotherapy and did require transfusions.     TREATMENT SUMMARY  1. Feraheme 510 mg IV on 2/14 and 2/21/19   2. Venofer 200 mg IV daily 5/8 - 5/10/2019 as IP at [facility]   3. s/p 2 units pRBC on 4/26/19 and 2 units pRBC on 4/29/2019 as IP at [facility]?  s/p 2 units pRBC on?5/8/2019   s/p 2 pheresis plts on 5/8/2019    REVIEW OF SYSTEMS:    Constitutional: positive for fatigue and weakness, fever 100.1×24 hours  HEENT: no blurring of vision, no double vision                                   Lungs: no hemoptysis; positive for dyspnea  CVS: no palpitation, no chest pain  GI: no abdominal pain, no nausea , no vomiting, no constipation, no recurrent melena  SHELBIE: no dysuria, frequency and urgency, no hematuria  Musculoskeletal: no joint pain, swelling , stiffness; positive for generalized weakness  Endocrine: no polyuria, polydipsia, no cold or heat intolerance  Hematology: positive for anemia, neutropenia  Dermatology: no skin rash, no eczema, no pruritus  Psychiatry: no depression, no anxiety,no panic attacks, no suicide ideation  Neurology: no syncope, no seizures    OBJECTIVE:    Vitals:    06/01/19 0300   BP: 129/73   Pulse: 93   Resp: 16   Temp: 97.8 °F (36.6 °C)   SpO2: 95%       Intake/Output Summary (Last 24 hours) at 6/1/2019 0814  Last data filed at 6/1/2019 0300  Gross per 24 hour   Intake 1368 ml   Output 840 ml   Net 528 ml     PHYSICAL EXAM:    CONSTITUTIONAL: NAD  EYES: prior left ocular surgery, anicteric  ENT: Mucous membranes moist, no oropharyngeal lesions   NECK: Supple, no masses   CHEST/LUNGS: CTA bilaterally, normal respiratory effort   CARDIOVASCULAR: RRR, no murmurs  ABDOMEN: soft non-tender, active bowel sounds, no HSM  EXTREMITIES: warm, moves all fours  SKIN: warm,  dry with no rashes or lesions, port without erythema  LYMPH: No cervical, clavicular, axillary, or inguinal lymphadenopathy  NEUROLOGIC: follows commands, non focal   PSYCH: mood and affect appropriate    CBC  Results from last 7 days   Lab Units 06/01/19  0446 05/31/19  0429 05/30/19  0805   WBC 10*3/mm3 1.93* 1.55* 1.20*   HEMOGLOBIN g/dL 8.2* 8.5* 6.6*   HEMATOCRIT % 25.3* 24.6* 20.4*   PLATELETS 10*3/mm3 59* 68* 92*         Lab Results   Component Value Date     06/01/2019    K 4.0 06/01/2019     06/01/2019    CO2 22.0 (L) 06/01/2019    BUN 33 (H) 06/01/2019    CREATININE 1.24 06/01/2019    GLUCOSE 84 06/01/2019    CALCIUM 9.0 06/01/2019    BILITOT 0.3 06/01/2019    ALKPHOS 86 06/01/2019    AST 36 06/01/2019    ALT 40 06/01/2019    AGRATIO 0.9 (L) 06/01/2019    GLOB 2.9 06/01/2019       Lab Results   Component Value Date    INR 0.94 05/08/2019    INR 0.99 04/26/2019    INR 1.35 (H) 03/14/2019    PROTIME 12.9 05/08/2019    PROTIME 13.4 04/26/2019    PROTIME 16 (H) 03/14/2019       Cultures:    Lab Results   Component Value Date    BLOODCX No growth at 2 days 05/29/2019     No components found for: URINCX    ASSESSMENT/PLAN:  1.  Neutropenic fever - neutropenia from chemotherapy     He did held Neulasta 6 mg subq administered 5/24/2019 by autoinjector     Peripheral blood culture x2 5/29/2019 at 1731 hrs. - no growth   Peripheral blood culture, port culture 5/29/2019 at 1952 and 2320 hrs. -no growth     pCXR 5/29/2019 - no acute infiltrates     WBC 1.9 with ANC 1.3 - receiving Neupogen 480 SUQ daily    Levaquin 500 mg IV x1 on admission    Aztreonam 1 g IV every 8 hours  Vancomycin 1 g IV x1, 750 mg IV daily     2.  Anemia related to chemotherapy and prior GI blood loss     Hgb 8.2/MCV 80.9 - (s/p 2 u pRBC 5/30/19)     3.  Thrombocytopenia from chemotherapy    PLT 68,000 ->59,000 at discharge    4.  Adenocarcinoma of the lung     Cycle 2 carboplatin/Alimta, Keytruda on 5/23/2019    Day  10    Disposition/Follow up- F/U CBC count Dr Michele Office and chemotherapy as scheduled. I agree with discharge.       Chon Rico MD    06/01/19  8:14 AM

## 2019-06-01 NOTE — PLAN OF CARE
Problem: Patient Care Overview  Goal: Plan of Care Review  Outcome: Ongoing (interventions implemented as appropriate)   06/01/19 0301   Coping/Psychosocial   Plan of Care Reviewed With patient   Plan of Care Review   Progress improving   OTHER   Outcome Summary pt alert and oriented x4, NSR on tele, voiding, safety maintained, will continue to monitor.       Problem: Skin Injury Risk (Adult)  Goal: Skin Health and Integrity  Outcome: Ongoing (interventions implemented as appropriate)      Problem: Infection, Risk/Actual (Adult)  Goal: Infection Prevention/Resolution  Outcome: Ongoing (interventions implemented as appropriate)      Problem: Fall Risk (Adult)  Goal: Absence of Fall  Outcome: Ongoing (interventions implemented as appropriate)

## 2019-06-01 NOTE — PROGRESS NOTES
PROGRESS NOTE  Patient name: Karoline Prater  Patient : 1940  VISIT # [id number]  MR #[id number]   Room 331    SUBJECTIVE: He does feel better today.    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  He did receive Neulasta injection via on body auto injector on 2019.  He awakened on 2019 with significant weakness.  He had fever of 100.1.  He denied any chills.  He presented to the emergency room and was found to be neutropenic and admitted with neutropenic fever.     TUMOR HISTORY: Adenocarcinoma on the RUL of the lung 2018  Karoline had CT scans performed for new onset of weight loss of 13 pounds over the previous year , associated with increased fatigue.   He denied respiratory symptoms of shortness of air, cough or productive sputum.  A CT scan of the chest on 2018 had been ordered by Dr. Irving Alexis due to weight loss and identified a new lobulated right upper lobe 5.7 cm mass that extended back to the right hilum.   Numerous mediastinal lymph nodes ranging in size from mm to 1.3 cm and a subcarnial lymph node that measured 1.5 x 1.5 x 3.5 cm.  A CT scan of the abdomen and pelvis with contrast on 2018 documented a 4.9 x 4 x 4 cm soft tissue mass with peripheral calcification immediately adjacent to the gallbladder in the head of the pancreas area.  An MRI of the abdomen and pelvis W & WO on 10/11/2018 identified in the 4.1 x 3.4 cm soft tissue mass in the subhepatic space, abutting the second portion of the duodenum with mild displacement of the duodenum. There does not appear to be involvement of the pancreas, and the pancreas appears within normal limits.  Differential diagnoses include a desmoid tumor, less likely leiomyoma of the wall of the duodenum or  malignancy.   Dr. Michele requested a CT-guided biopsy of the right upper lobe mass.   Dr. Rosales, the radiologist, evaluated the area with a repeat CT scan of the chest on 10/11/2018.   He spoke with Dr. Michele indicating that he would not be able to perform the biopsy because the tumor was not accessible, it was under the scapula , which blocked access and therefore the biopsy procedure was canceled.  On the CT scan of the chest on 10/11/2018 measurements of the RUL lung mass was 5.7 x 3.2 cm with irregular margins suspicious for a primary lung neoplasm. Soft tissue associated with the tumor appeared to extend into the bronchus supplying this portion of the RUL of the lung.   Dr. Rosales indicated that the mass had an endobronchial component and should be easily assessable via bronchoscopy.  The chest CT also included a portion of the upper abdomen where a soft tissue mass was described in an addendum report. In the subhepatic space measuring 4.0 x 3.9 cm, displacing the second portion of the duodenum medially.  A referral was made to Dr. Becerra for consideration for bronchoscopy for biopsy.  On 10/24/2018, Dr. Gareth Becerra performed a bronchoscopy with EBUS guided biopsy of a 3 cm subcarinal lymph node along with bronchoalveolar lavage of the posterior segment of the RUL of the lung.  The final pathology of the station 7 lymph node only revealed a background of small mature lymphocytes with clusters of histiocytes suggestive of granuloma.  Negative for malignant cells.  Kinyoun and GMS stains were negative for AFB and fungal organisms respectively.  The bronchial washings were negative for malignant cells as well.   Mr. Prater was referred to Dr. Meenakshi Polanco at [facility] in [place], [place], for navigational bronchoscopy and biopsy under ultrasound.  On 11/27/2018 Dr. Meenakshi Polanco performed a bronchoscopy, navigational protocol, endobronchial ultrasound and biopsy of the RUL of the lung  mass along with multiple lymph node station Biopsies.  Pathology revealed the following:  · Poorly differentiated Adenocarcinoma from the RUL of the lung mass on biopsy and bronchoalveolar lavage consistent with a lung primary.   · All lymph nodes sampled were NEGATIVE for malignant cells including stations 10L, 4L, station 7, 10R, 4R.   · IHC studies were positive for CK7, TTF-1 and Napsin A.   · IHC studies on tumor cells were negative for CDX2, CK5/6, and p63   · Further lung profile molecular testing is pending  All of the above was discussed at length with Mr. Prater at his 12/13/2018 clinic visit.   He was agreeable for referral for consideration of resection of the lung lesion.   He is comfortable with and would like a referral to Dr. Ulysses Bardales ([phone]) at [facility], [address] [place] [address], [address], [place] [address].  Logistics, however, are planning a big part in his decision making and he may decide to have surgery done in the [place] area.  If he decides to have surgery in the [place], referral will be made to Dr. Frandy Patel.   CT chest with contrast 2/18/2019 at [facility] compared to 9/12/2018 revealed a 4.9 x 3.5 cm spiculated RUL lung mass which actually decreased in size from a prior value of 6.1 x 3.6 cm.   Subhepatic mass adjacent to the descending duodenum had increased in size significantly to 4.3 x 9 cm compared to 4.1 x 3.4 cm on the 10/11/18 MRI of the abdomen.  CT of the abdomen and pelvis without contrast on 3/20/2019 at [facility] was compared to outpatient CT data and pelvis on 9/12/2018 an MRI of the abdomen from 10/11/2018. A soft tissue mass was again encountered in the subhepatic space which abutted the distal stomach and proximal duodenum measuring 8.9 x 5.4 cm which has increased considerably from a prior measurement of 4.1 x 3.4 cm. Medial to the left renal hilum a 3.5 cm lobular mass causing some mild left-sided hydronephrosis.  Mr. Prater was referred to   Frandy Patel who saw him on 1/22/2019 anticipating plans for a curative resection.   Dr. Michele received a phone call on 2/20/2019 from Dr. Patel , indicating that the previously documented an abdominal pancreatic area mass had doubled in size and was causing compression into the left kidney/renal pelvis producing a hydroureter.   Dr. Patel arranged a referral to Dr. Sylvester who will be placing a stent 3/8/2019.  Mr. Prater was scheduled to be seen by gastroenterology at [facility] on 3/13/2019 for EGD/EUS assessment and biopsy of the enlarging peripancreatic/duodenal area mass.  With a decrease in the lung mass and increased size of the subhepatic mass-surgical resection was abandoned at present pending further evaluation of the subhepatic mass.  Dr. Michele discussed treatment options with the unresectable lung mass and the per he pancreatic mass and recommended a combination of Keytruda, Alimta, carboplatin.  He was subsequently admitted to [facility] 4/26 - 4/30/2019 with abdominal pain and melena. WBC pinky was 0.75 with ANC 0.34. PLT did drop to 24,000. He did have duodenal ulcerations that were bleeding on endoscopy. He was stabilized but then readmitted from 5/8 - 5/10/2019 with recurrent melanoma. A repeat endoscopy was performed. It was felt that exacerbation of his bleeding from the duodenal came about by his thrombocytopenia from treatment. Subsequent dosing was adjusted and Neulasta was added.     TREATMENT SUMMARY  1. Keytruda, carboplatin, Alimta initiated 4/18/2019      #2 TUMOR HISTORY: Pancreatic mass diagnosed 10/29/03  Mr. Prater presented in October 2003 with obstructive jaundice.   A CT scan of the abdomen on 10/26/2003 documented a 3.2 x 4 x 4.2 cm mass in the head of the pancreas.   On 10/29/03 Dr. Alexis performed a resection of a portion of the head of the pancreas on Fahad Prater along with a Klaudia-en-Y procedure and a choledochojejunostomy for what was felt to be a pancreatic cancer. His  pancreas was bypassed because of the findings.  Pathology of the biopsies were negative for malignancy showing a fibrosed pancreas.   Mr. Prater was referred to Dr. Vishal High in [place].  Dr. Vishal High performed ERCP with an EUS and biopsy on 02/26/04   Pathology again was negative for cancer or malignancy.   Routine periodic serologic and radiographic monitoring has not disclosed progression of the mass or development of new malignancy or increase in pancreatic tumor markers.   A CT scan of the abdomen and pelvis with contrast on 9/12/2018 documented a 4.9 x 4 x 4 cm soft tissue mass with peripheral calcification immediately adjacent to the gallbladder in the head of the pancreas area.  An MRI of the abdomen and pelvis W & WO on 10/11/2018 identified in the 4.1 x 3.4 cm soft tissue mass in the subhepatic space, abutting the second portion of the duodenum with mild displacement of the duodenum. There does not appear to be involvement of the pancreas, and the pancreas appears within normal limits.  Differential diagnoses include a desmoid tumor, less likely leiomyoma of the wall of the duodenum or malignancy.   CT of the abdomen and pelvis without contrast on 3/20/2019 at [facility] was compared to outpatient CT data and pelvis on 9/12/2018 an MRI of the abdomen from 10/11/2018. A soft tissue mass was again encountered in the subhepatic space which abutted the distal stomach and proximal duodenum measuring 8.9 x 5.4 cm which has increased considerably from a prior measurement of 4.1 x 3.4 cm. Medial to the left renal hilum a 3.5 cm lobular mass causing some mild left-sided hydronephrosis.  Mr. Prater was scheduled for an EGD/EUS by Dr. Bryson Moe as an outpatient procedure on 3/13/2019 for assessment and biopsy of the enlarging peripancreatic/duodenal area mass. Unfortunately, after initiation of the procedure, he began having ventricular tachycardia and the procedure had to be aborted. Karoline was transferred to the  ICU for cardiology evaluation and stabilization. He remained hospitalized until 3/18/2019 when he was medically cleared for discharge.  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 No emergent urologic intervention was warranted and he received monitoring of renal function. He had a creatinine level of 1.9 at discharge.  PET scan on 4/2/2019 identified a soft tissue mass in the RUL measuring 1.2 x 3.5 cm with extension to the right anterior hilum with an SUV of 10.1. Evidence of mediastinal and hilar lymphadenopathy, with low-level metabolic activity. Interval increase in the size of a large mass in the right upper abdomen inseparable from the duodenum measuring 10.7 x 6.9 cm compared to 5.4 x 8.9 cm on 2/20/2019 with an SUV of 23.6. Slight increase in 2 small inseparable masses medial to the hilum of the left kidney measuring 2.2 and 2.6 cm and the other measuring 3.9 cm with a maximum SUV of 18.6.  Cycle #1 of palliative chemotherapy with Carboplatin, Alimta and Keytruda was initiated 4/18/19 which should also cover this process.  Abdominal/pelvic CT 4/26/19 was performed at [facility] due to abdominal pain and melena. The RUQ mass, within the duodenum decreased to 6.8 x 6.2 cm?(was 10.7 x 6.9 cm on PET 4/2/19)     #1 HEMATOLOGICAL HISTORY: IgG kappa MGUS- Dx: 02/23/06.  During the course of Mr. Prater’s follow up, an abnormally elevated protein was noted on one occasion, which led to workup including quantitative immunoglobulins.   An elevated IgG kappa was encountered. This has remained stable in the 2500 range since early 2006.   A bone marrow biopsy and aspirate on 02/23/06 was negative with only 4% plasma cells.   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   24 hour urine for immunoelectrophoresis did not reveal an M-spike.  Serology studies on 9/18/2018 documented an elevated, stable IgG of 2589 with an M  spike of 0.7.   Immunofixation continued to reveal IgG monoclonal protein with kappa light chain specificity.     #2 HEMATOLOGICAL HISTORY: Iron deficiency anemia, 9/18/2018  Karoline had serology on 9/18/2018 that identified iron deficiency anemia.   His lab work was obtained at [facility].  An iron level was 12, iron saturation 7%, ferritin 256  Folate >20, and vitamin B12 1044.   Dr. Li placed Karoline on ferrous sulfate 325 mg daily on 9/25/2018 , but this failed to resolve the anemia.  An EGD by Dr. Randy Somers on 12/11/2018 documented a normal esophagus, normal stomach and a degree of duodenal deformity.  Gastric biopsy was urease negative.  Colonoscopy by Dr. Randy Somers on 1/9/2019 documented a polyp at 80 cm.  Pathology identified a tubular adenoma, negative for high-grade dysplasia.   Feraheme administered.  Labs on 3/13/2019:  · Iron 25   · Iron saturation 22%   · TIBC 116   · Ferritin >2000   · Reticulocyte count 0.0578   ·    · Haptoglobin 341   · Folate >20   · Vitamin B12 945   · Erythropoietin 13  He presented to [facility] on 4/26/2019 with melena and abdominal discomfort. He was subsequently admitted  EGD per Dr. Jj on 4/29/2019 revealed  · LA grade (1 or more mucosal breaks greater than 5 mm, not extending between the tops of the 2 mucosal folds)?esophagitis with no bleeding found in the distal esophagus   · Stomach was normal, biopsies for H. pylori taken.   · Cardia and gastric fundus were normal on retroflexion   · One nonbleeding cratered duodenal ulcer with no stigmata of bleeding was found in the duodenal bulb lesion was about 9 mm.   · Many nonbleeding cratered, linear and superficial duodenal ulcers with no stigmata of bleeding were found in the second portion of the duodenum. The largest lesion was 6 mm in largest dimension. No mass encountered and anastomosis not seen.  He was admitted to [facility] on 5/8/2019 with melena, hematochezia, anemia,  thrombocytopenia  Endoscopy performed by Dr. Bansal on 5/9/2019 revealed  · Normal esophagus   · Gastric mucosal variant. 2 erythematous spots in the antrum, ? AVM   · Normal examined duodenum   · Nothing found to account for symptoms   He developed pancytopenia from chemotherapy and did require transfusions.     TREATMENT SUMMARY  1. Feraheme 510 mg IV on 2/14 and 2/21/19   2. Venofer 200 mg IV daily 5/8 - 5/10/2019 as IP at [facility]   3. s/p 2 units pRBC on 4/26/19 and 2 units pRBC on 4/29/2019 as IP at [facility]?  s/p 2 units pRBC on?5/8/2019   s/p 2 pheresis plts on 5/8/2019    REVIEW OF SYSTEMS:    Constitutional: positive for fatigue and weakness, fever 100.1×24 hours  HEENT: no blurring of vision, no double vision                                   Lungs: no hemoptysis; positive for dyspnea  CVS: no palpitation, no chest pain  GI: no abdominal pain, no nausea , no vomiting, no constipation, no recurrent melena  SHELBIE: no dysuria, frequency and urgency, no hematuria  Musculoskeletal: no joint pain, swelling , stiffness; positive for generalized weakness  Endocrine: no polyuria, polydipsia, no cold or heat intolerance  Hematology: positive for anemia, neutropenia  Dermatology: no skin rash, no eczema, no pruritus  Psychiatry: no depression, no anxiety,no panic attacks, no suicide ideation  Neurology: no syncope, no seizures    OBJECTIVE:    Vitals:    06/01/19 0300   BP: 129/73   Pulse: 93   Resp: 16   Temp: 97.8 °F (36.6 °C)   SpO2: 95%       Intake/Output Summary (Last 24 hours) at 6/1/2019 0751  Last data filed at 6/1/2019 0300  Gross per 24 hour   Intake 1368 ml   Output 840 ml   Net 528 ml     PHYSICAL EXAM:    CONSTITUTIONAL: NAD  EYES: prior left ocular surgery, anicteric  ENT: Mucous membranes moist, no oropharyngeal lesions   NECK: Supple, no masses   CHEST/LUNGS: CTA bilaterally, normal respiratory effort   CARDIOVASCULAR: RRR, no murmurs  ABDOMEN: soft non-tender, active bowel sounds, no HSM  EXTREMITIES: warm,  moves all fours  SKIN: warm, dry with no rashes or lesions, port without erythema  LYMPH: No cervical, clavicular, axillary, or inguinal lymphadenopathy  NEUROLOGIC: follows commands, non focal   PSYCH: mood and affect appropriate    CBC  Results from last 7 days   Lab Units 06/01/19  0446 05/31/19  0429 05/30/19  0805   WBC 10*3/mm3 1.93* 1.55* 1.20*   HEMOGLOBIN g/dL 8.2* 8.5* 6.6*   HEMATOCRIT % 25.3* 24.6* 20.4*   PLATELETS 10*3/mm3 59* 68* 92*         Lab Results   Component Value Date     06/01/2019    K 4.0 06/01/2019     06/01/2019    CO2 22.0 (L) 06/01/2019    BUN 33 (H) 06/01/2019    CREATININE 1.24 06/01/2019    GLUCOSE 84 06/01/2019    CALCIUM 9.0 06/01/2019    BILITOT 0.3 06/01/2019    ALKPHOS 86 06/01/2019    AST 36 06/01/2019    ALT 40 06/01/2019    AGRATIO 0.9 (L) 06/01/2019    GLOB 2.9 06/01/2019       Lab Results   Component Value Date    INR 0.94 05/08/2019    INR 0.99 04/26/2019    INR 1.35 (H) 03/14/2019    PROTIME 12.9 05/08/2019    PROTIME 13.4 04/26/2019    PROTIME 16 (H) 03/14/2019       Cultures:    Lab Results   Component Value Date    BLOODCX No growth at 2 days 05/29/2019     No components found for: URINCX    ASSESSMENT/PLAN:  1.  Neutropenic fever - neutropenia from chemotherapy     He did held Neulasta 6 mg subq administered 5/24/2019 by autoinjector     Peripheral blood culture x2 5/29/2019 at 1731 hrs. - no growth at 24 hours  Peripheral blood culture, port culture 5/29/2019 at 1952 and 2320 hrs. -no growth at 24 hours     pCXR 5/29/2019 - no acute infiltrates     WBC 1.55 with ANC 0.81 - receiving Neupogen 480 subcu daily    Levaquin 500 mg IV x1 on admission    Aztreonam 1 g IV every 8 hours  Vancomycin 1 g IV x1, 750 mg IV daily     2.  Anemia related to chemotherapy and prior GI blood loss     Hgb 8.5/MCV 80.9 - (s/p 2 u pRBC 5/30/19)     3.  Thrombocytopenia from chemotherapy    PLT 68,000 - continuing to drop    4.  Adenocarcinoma of the lung     Cycle 2  carboplatin/Alimta, Keytruda on 5/23/2019    Day 9      Courtney Castrejon, MARITZA    06/01/19  7:51 AM

## 2019-06-01 NOTE — DISCHARGE SUMMARY
[facility] Medicine Services  DISCHARGE SUMMARY       Date of Admission: 5/29/2019  Date of Discharge:  6/1/2019  Primary Care Physician: Irving Alexis MD    Presenting Problem/History of Present Illness:  Neutropenic fever (CMS/HCC) [D70.9, R50.81]     Final Discharge Diagnoses:  Active Hospital Problems    Diagnosis   • **Neutropenic fever (CMS/HCC)   • Atrial fibrillation with rapid ventricular response (CMS/HCC)   • Stage 3 chronic kidney disease (CMS/HCC)   • Malignant neoplasm of upper lobe of right lung (CMS/HCC)   • Anemia   • Body mass index (BMI) of 25.0 to 29.9   • Monoclonal gammopathy of unknown significance (MGUS)   • Cardiomyopathy, nonischemic (CMS/HCC)   • NSVT (nonsustained ventricular tachycardia) (CMS/HCC)       Consults: Oncology.    Pertinent Test Results:   Impression: Chest x-ray  1.  Right upper lobe opacity, reflecting known mass.  2.  Otherwise, no significant interval changes.    IMPRESSION: CT scan abdomen pelvic  1. Interval decrease in size of the duodenal or letty hepatis mass.  2. Interval decrease in size of the obstructive mass in the LEFT renal  hilum. This now measures approximately 2.9 x 2.1 cm.  3. No new disease.  4. Urinary bladder distention and hydroureter is likely due to outlet  Obstruction.    Interpretation Summary echocardiogram       · Left ventricular wall thickness is consistent with hypertrophy. Sigmoid-shaped ventricular septum is present.  · Left ventricular diastolic dysfunction (grade I) consistent with impaired relaxation.  · Left ventricular systolic function is low normal.  · Left atrial cavity size is borderline dilated.     LOW NORMAL LV SYSTOLIC FUNCTION  NORMAL RV FUNCTION  GRADE 1 LV DIASTOLIC DYSFUNCTION  NO SIGNIFICANT VALVULAR DYSFUNCTION  MILD PULMONARY HTN     Left Ventricle Left ventricular systolic function is low normal. Estimated EF appears to be in the range of 51 - 55%. Global Longitudinal LV  "strain = -18%. Normal left ventricular cavity size noted. All left ventricular wall segments contract normally. Left ventricular wall thickness is consistent with hypertrophy. Sigmoid-shaped ventricular septum is present. Left ventricular diastolic dysfunction is noted (grade I) consistent with impaired relaxation.       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  He was initiated cycle #1 of Carboplatinum, Alimta and Keytruda on 4/18/19.  Second cycle was given on 5/23/2019.  I believe patient did receive Neulasta on following day.  He states initially he was unable to rest and had no sleep the Friday following chemo leading to sleepiness on Saturday, weakness developed on Sunday however he also developed anorexia which has been ongoing since chemo.  Today he was extremely weak and they did he \"felt bad\".  Home health nurse is following saw him today and documented fever of 101.1.  Apparently oncology office was notified and RN advised patient to seek evaluation at [facility] emergency department.  ER evaluation revealed white count 1.25 with ANC 0.63.  Other findings revealed hyponatremia, creatinine 1.5, this is baseline, GFR 44.  Hemoglobin is 7.3 platelet count 131.  Call placed to PATRICA Shrestha call oncology and notified him of patient's condition, he will see in a.m.  Patient is admitted for further evaluation and treatment.    Hospital Course:  The patient is a 78 y.o. 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      Neutropenic fever.  Neutropenia from chemotherapy. Neupogen daily.  She initially was " started on Azactam and vancomycin.  Blood culture- no growth in 2 days.  MediPort cultures -no growth 2 days.. Plan to send patient with low-dose Levaquin antibiotics due to chronic renal failure for another 5 days. Patient has been afebrile for last 2 days.     Adenocarcinoma of the right lung with metastatic disease with pancreatic mass/monoclonal gammopathy- MGUS.  Consult Dr. Michele.  Last dose of chemotherapy 4/18/2019.  Patient follow-up with oncology in 2 weeks.  Patient has appointment for chemotherapy in 2 weeks.     Atrial fibrillation/history of cardiomyopathy/hypertension.    From care everywhere-heart cath 3/15/2019 mild coronary artery disease,terminal portion of the circumflex / OM3 is diffusely diseased > 80% and very small, < 1 mm in diameter.   Repeat echocardiogram- ejection fraction 51%, global longitudinal left ventricular strain -18%, left atrial hypertrophy, diastolic dysfunction grade 1, mild pulmonary hypertension.  DC amiodarone drip 5/31/2019. Consult cardiology.    Chads score of 4.  No anticoagulation due to thrombocytopenia/anemia/neutropenia.    Patient to continue Lopressor.  Patient to continue flecainide.  Patient is currently in sinus rhythm heart rates in the 90s.     Anemia.  2 units transfusion by oncology 5/30/2019.  No sign of acute bleed.     Neutropenia is improving.  Patient given another dose of Neupogen before discharge.    Thrombocytopenia slightly worsening.      Acute on chronic renal failure stage III/obstructive mass left renal hilum/outlet obstruction syndrome.  Stable.   Patient started on  IV fluids.  Patient to continue bicarb.    Hyponatremia.  Resolved with electrolyte replacement.     Nausea.  Patient to continue Protonix.  Patient to continue Carafate.       Benign prostate hypertrophy.  Patient to continue Flomax.    Insomnia.  Patient to continue Ambien.    Nausea.  Pepcid and Carafate.     Nutrition.  Regular diet.  Patient to continue Megace.     Vital  "signs stable, afebrile.  Patient follow with PCP in 1 week.  Patient follow with oncology/chemotherapy in 2 weeks.    Condition on Discharge: Stable.    Physical Exam on Discharge:  /73 (BP Location: Left arm, Patient Position: Lying)   Pulse 93   Temp 97.8 °F (36.6 °C) (Oral)   Resp 16   Ht 162.6 cm (64.02\")   Wt 62.6 kg (138 lb 0.1 oz)   SpO2 95%   BMI 23.68 kg/m²   Physical Exam  Constitutional: He is oriented to person, place, and time. He appears well-developed.   Cachectic.   HENT:   Head: Normocephalic and atraumatic.   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  Neck: Neck supple. No JVD present. No thyromegaly present.   Cardiovascular: Irregularly irregular, rate is 90.  And intact distal pulses. Exam reveals no gallop and no friction rub.   No murmur heard.  Pulmonary/Chest: Effort normal and breath sounds normal. No respiratory distress. He has no wheezes. He has no rales. He exhibits no tenderness.   Diminished breath sound bilateral, clear.   Abdominal: Soft. Bowel sounds are normal. He exhibits no distension. There is no tenderness. There is no rebound and no guarding.   Musculoskeletal: Normal range of motion. He exhibits no edema, tenderness or deformity.   Lymphadenopathy:     He has no cervical adenopathy.   Neurological: He is alert and oriented to person, place, and time. He displays normal reflexes. No cranial nerve deficit. He exhibits normal muscle tone.   Skin: Skin is warm and dry. No rash noted.   Psychiatric: He has a normal mood and affect. His behavior is normal. Judgment and thought content normal.   Nursing note and vitals reviewed        Discharge Disposition:  Home or Self Care    Discharge Medications:     Discharge Medications      New Medications      Instructions Start Date   flecainide 50 MG tablet  Commonly known as:  TAMBOCOR   50 mg, Oral, Every 12 Hours Scheduled      levoFLOXacin 250 MG tablet  Commonly known as:  LEVAQUIN   250 mg, Oral, Daily      metoprolol tartrate 25 MG " tablet  Commonly known as:  LOPRESSOR   12.5 mg, Oral, Every 12 Hours Scheduled         Continue These Medications      Instructions Start Date   folic acid 1 MG tablet  Commonly known as:  FOLVITE   1 mg, Oral, Daily      megestrol 40 MG/ML suspension  Commonly known as:  MEGACE   800 mg, Oral, Daily      melatonin 5 MG tablet tablet   5 mg, Oral, Nightly PRN      MULTIVITAMIN ADULT PO   1 tablet, Oral, Daily      pantoprazole 40 MG EC tablet  Commonly known as:  PROTONIX   40 mg, Oral, Daily      sodium bicarbonate 650 MG tablet   650 mg, Oral, 2 Times Daily      sucralfate 1 GM/10ML suspension  Commonly known as:  CARAFATE   2 g, Oral, 2 Times Daily      tamsulosin 0.4 MG capsule 24 hr capsule  Commonly known as:  FLOMAX   1 capsule, Oral, Nightly      zolpidem 5 MG tablet  Commonly known as:  AMBIEN   5 mg, Oral, Nightly PRN         Stop These Medications    amLODIPine 5 MG tablet  Commonly known as:  NORVASC            Discharge Diet:   Diet Instructions     Advance Diet As Tolerated            Activity at Discharge:   Activity Instructions     Activity as Tolerated            Discharge Care Plan/Instructions:     Follow-up Appointments:   Follow-up with PCP 1 week.  Follow-up with chemotherapy/oncology in 2 weeks.    Zachary Lloyd MD  06/01/19  8:26 AM    Time: Greater than 30 minutes.

## 2019-06-02 ENCOUNTER — READMISSION MANAGEMENT (OUTPATIENT)
Dept: CALL CENTER | Facility: HOSPITAL | Age: 79
End: 2019-06-02

## 2019-06-02 NOTE — OUTREACH NOTE
Prep Survey      Responses   Facility patient discharged from?  [facility]   Is patient eligible?  Yes   Discharge diagnosis  Neutropenic fever, Afib with RVR, CKD III, malignant neoplasm of RUL, anemia, Monoclonal gammopathy of unknown significance, cardiomyopathy, nonischemic, NSVT   Does the patient have one of the following disease processes/diagnoses(primary or secondary)?  Other   Does the patient have Home health ordered?  No   Is there a DME ordered?  No   Comments regarding appointments  See AVS   Prep survey completed?  Yes          Albertina Mike RN

## 2019-06-03 LAB
BACTERIA SPEC AEROBE CULT: NORMAL

## 2019-06-04 DIAGNOSIS — C34.90 NON-SMALL CELL LUNG CANCER, UNSPECIFIED LATERALITY (HCC): ICD-10-CM

## 2019-06-05 ENCOUNTER — READMISSION MANAGEMENT (OUTPATIENT)
Dept: CALL CENTER | Facility: HOSPITAL | Age: 79
End: 2019-06-05

## 2019-06-05 NOTE — OUTREACH NOTE
Medical Week 1 Survey      Responses   Facility patient discharged from?  [facility]   Does the patient have one of the following disease processes/diagnoses(primary or secondary)?  Other   Is there a successful TCM telephone encounter documented?  No   Week 1 attempt successful?  No   Unsuccessful attempts  Attempt 1          Fransisca Muñiz RN

## 2019-06-06 ENCOUNTER — READMISSION MANAGEMENT (OUTPATIENT)
Dept: CALL CENTER | Facility: HOSPITAL | Age: 79
End: 2019-06-06

## 2019-06-06 ENCOUNTER — HOSPITAL ENCOUNTER (OUTPATIENT)
Dept: INFUSION THERAPY | Age: 79
Discharge: HOME | End: 2019-06-06
Payer: MEDICARE

## 2019-06-06 PROCEDURE — 85025 COMPLETE CBC W/AUTO DIFF WBC: CPT

## 2019-06-06 NOTE — OUTREACH NOTE
Medical Week 1 Survey      Responses   Facility patient discharged from?  [facility]   Does the patient have one of the following disease processes/diagnoses(primary or secondary)?  Other   Is there a successful TCM telephone encounter documented?  No   Week 1 attempt successful?  No   Unsuccessful attempts  Attempt 2          Julita Warner RN

## 2019-06-10 ENCOUNTER — READMISSION MANAGEMENT (OUTPATIENT)
Dept: CALL CENTER | Facility: HOSPITAL | Age: 79
End: 2019-06-10

## 2019-06-10 NOTE — OUTREACH NOTE
Medical Week 2 Survey      Responses   Facility patient discharged from?  [place]   Does the patient have one of the following disease processes/diagnoses(primary or secondary)?  Other   Week 2 attempt successful?  No   Unsuccessful attempts  Attempt 1          Mahnaz Dumas RN

## 2019-06-12 ENCOUNTER — READMISSION MANAGEMENT (OUTPATIENT)
Dept: CALL CENTER | Facility: HOSPITAL | Age: 79
End: 2019-06-12

## 2019-06-12 NOTE — OUTREACH NOTE
Medical Week 2 Survey      Responses   Facility patient discharged from?  [place]   Does the patient have one of the following disease processes/diagnoses(primary or secondary)?  Other   Week 2 attempt successful?  Yes   Call start time  [id number]   Discharge diagnosis  Neutropenic fever, Afib with RVR, CKD III, malignant neoplasm of RUL, anemia, Monoclonal gammopathy of unknown significance, cardiomyopathy, nonischemic, NSVT   Call end time  1001   Meds reviewed with patient/caregiver?  Yes   Is the patient having any side effects they believe may be caused by any medication additions or changes?  No   Does the patient have all medications ordered at discharge?  Yes   Is the patient taking all medications as directed (includes completed medication regime)?  Yes   Does the patient have a primary care provider?   Yes   Does the patient have an appointment with their PCP within 7 days of discharge?  Yes   Has the patient kept scheduled appointments due by today?  Yes   What is the Home health agency?   [facility]   Has home health visited the patient within 72 hours of discharge?  Yes   Psychosocial issues?  No   Comments  Patient reports he is currently feeling better. No fever or chills.    Did the patient receive a copy of their discharge instructions?  Yes   Nursing interventions  Reviewed instructions with patient   What is the patient's perception of their health status since discharge?  Improving   Is the patient/caregiver able to teach back signs and symptoms related to disease process for when to call PCP?  Yes   Is the patient/caregiver able to teach back signs and symptoms related to disease process for when to call 911?  Yes   Is the patient/caregiver able to teach back the hierarchy of who to call/visit for symptoms/problems? PCP, Specialist, Home health nurse, Urgent Care, ED, 911  Yes   Week 2 Call Completed?  Yes          Paul Cadena RN

## 2019-06-13 ENCOUNTER — TRANSCRIBE ORDERS (OUTPATIENT)
Dept: ADMINISTRATIVE | Facility: HOSPITAL | Age: 79
End: 2019-06-13

## 2019-06-13 ENCOUNTER — HOSPITAL ENCOUNTER (OUTPATIENT)
Dept: INFUSION THERAPY | Age: 79
Discharge: HOME | End: 2019-06-13
Payer: MEDICARE

## 2019-06-13 DIAGNOSIS — C34.11 MALIGNANT NEOPLASM OF UPPER LOBE OF RIGHT LUNG (HCC): Primary | ICD-10-CM

## 2019-06-13 DIAGNOSIS — C34.90 NON-SMALL CELL LUNG CANCER, UNSPECIFIED LATERALITY (HCC): ICD-10-CM

## 2019-06-13 DIAGNOSIS — C34.11 MALIGNANT NEOPLASM OF UPPER LOBE, RIGHT BRONCHUS OR LUNG (HCC): Primary | ICD-10-CM

## 2019-06-13 PROCEDURE — 6360000002 HC RX W HCPCS: Performed by: INTERNAL MEDICINE

## 2019-06-13 PROCEDURE — 2580000003 HC RX 258: Performed by: INTERNAL MEDICINE

## 2019-06-13 PROCEDURE — 80053 COMPREHEN METABOLIC PANEL: CPT

## 2019-06-13 PROCEDURE — 96417 CHEMO IV INFUS EACH ADDL SEQ: CPT

## 2019-06-13 PROCEDURE — 83550 IRON BINDING TEST: CPT

## 2019-06-13 PROCEDURE — 96413 CHEMO IV INFUSION 1 HR: CPT

## 2019-06-13 PROCEDURE — 96375 TX/PRO/DX INJ NEW DRUG ADDON: CPT

## 2019-06-13 PROCEDURE — 82728 ASSAY OF FERRITIN: CPT

## 2019-06-13 PROCEDURE — 84443 ASSAY THYROID STIM HORMONE: CPT

## 2019-06-13 PROCEDURE — 85025 COMPLETE CBC W/AUTO DIFF WBC: CPT

## 2019-06-13 PROCEDURE — 83540 ASSAY OF IRON: CPT

## 2019-06-13 PROCEDURE — 96377 APPLICATON ON-BODY INJECTOR: CPT

## 2019-06-13 RX ORDER — SODIUM CHLORIDE 0.9 % (FLUSH) 0.9 %
10 SYRINGE (ML) INJECTION PRN
Status: DISCONTINUED | OUTPATIENT
Start: 2019-06-13 | End: 2019-06-14 | Stop reason: HOSPADM

## 2019-06-13 RX ORDER — METHYLPREDNISOLONE SODIUM SUCCINATE 125 MG/2ML
125 INJECTION, POWDER, LYOPHILIZED, FOR SOLUTION INTRAMUSCULAR; INTRAVENOUS PRN
Status: DISCONTINUED | OUTPATIENT
Start: 2019-06-13 | End: 2019-06-15 | Stop reason: HOSPADM

## 2019-06-13 RX ORDER — DIPHENHYDRAMINE HYDROCHLORIDE 50 MG/ML
50 INJECTION INTRAMUSCULAR; INTRAVENOUS PRN
Status: DISCONTINUED | OUTPATIENT
Start: 2019-06-13 | End: 2019-06-15 | Stop reason: HOSPADM

## 2019-06-13 RX ORDER — SODIUM CHLORIDE 9 MG/ML
20 INJECTION, SOLUTION INTRAVENOUS ONCE
Status: DISCONTINUED | OUTPATIENT
Start: 2019-06-13 | End: 2019-06-15 | Stop reason: HOSPADM

## 2019-06-13 RX ORDER — PALONOSETRON 0.05 MG/ML
0.25 INJECTION, SOLUTION INTRAVENOUS ONCE
Status: DISCONTINUED | OUTPATIENT
Start: 2019-06-13 | End: 2019-06-15 | Stop reason: HOSPADM

## 2019-06-13 RX ORDER — SODIUM CHLORIDE 0.9 % (FLUSH) 0.9 %
5 SYRINGE (ML) INJECTION PRN
Status: DISCONTINUED | OUTPATIENT
Start: 2019-06-13 | End: 2019-06-14 | Stop reason: HOSPADM

## 2019-06-13 RX ORDER — DEXAMETHASONE SODIUM PHOSPHATE 10 MG/ML
10 INJECTION, SOLUTION INTRAMUSCULAR; INTRAVENOUS ONCE
Status: DISCONTINUED | OUTPATIENT
Start: 2019-06-13 | End: 2019-06-15 | Stop reason: HOSPADM

## 2019-06-13 RX ORDER — EPINEPHRINE 1 MG/ML
0.3 INJECTION, SOLUTION, CONCENTRATE INTRAVENOUS PRN
Status: DISCONTINUED | OUTPATIENT
Start: 2019-06-13 | End: 2019-06-15 | Stop reason: HOSPADM

## 2019-06-13 RX ORDER — HEPARIN SODIUM (PORCINE) LOCK FLUSH IV SOLN 100 UNIT/ML 100 UNIT/ML
500 SOLUTION INTRAVENOUS PRN
Status: DISCONTINUED | OUTPATIENT
Start: 2019-06-13 | End: 2019-06-14 | Stop reason: HOSPADM

## 2019-06-20 ENCOUNTER — HOSPITAL ENCOUNTER (OUTPATIENT)
Age: 79
Discharge: HOME | End: 2019-06-20
Payer: MEDICARE

## 2019-06-20 ENCOUNTER — READMISSION MANAGEMENT (OUTPATIENT)
Dept: CALL CENTER | Facility: HOSPITAL | Age: 79
End: 2019-06-20

## 2019-06-25 ENCOUNTER — HOSPITAL ENCOUNTER (OUTPATIENT)
Dept: INFUSION THERAPY | Age: 79
Discharge: HOME | End: 2019-06-25
Payer: MEDICARE

## 2019-06-25 PROCEDURE — 85025 COMPLETE CBC W/AUTO DIFF WBC: CPT

## 2019-06-26 ENCOUNTER — HOSPITAL ENCOUNTER (OUTPATIENT)
Dept: INFUSION THERAPY | Age: 79
Setting detail: INFUSION SERIES
Discharge: HOME | End: 2019-06-26
Payer: MEDICARE

## 2019-06-26 VITALS
RESPIRATION RATE: 17 BRPM | HEART RATE: 84 BPM | SYSTOLIC BLOOD PRESSURE: 158 MMHG | DIASTOLIC BLOOD PRESSURE: 96 MMHG | TEMPERATURE: 98.2 F

## 2019-06-26 LAB
ABO/RH: NORMAL
ANTIBODY SCREEN: NORMAL
BLOOD BANK DISPENSE STATUS: NORMAL
BLOOD BANK DISPENSE STATUS: NORMAL
BLOOD BANK PRODUCT CODE: NORMAL
BLOOD BANK PRODUCT CODE: NORMAL
BPU ID: NORMAL
BPU ID: NORMAL
DESCRIPTION BLOOD BANK: NORMAL
DESCRIPTION BLOOD BANK: NORMAL

## 2019-06-26 PROCEDURE — 6370000000 HC RX 637 (ALT 250 FOR IP): Performed by: PHYSICIAN ASSISTANT

## 2019-06-26 PROCEDURE — 36430 TRANSFUSION BLD/BLD COMPNT: CPT

## 2019-06-26 PROCEDURE — 86900 BLOOD TYPING SEROLOGIC ABO: CPT

## 2019-06-26 PROCEDURE — 2580000003 HC RX 258: Performed by: PHYSICIAN ASSISTANT

## 2019-06-26 PROCEDURE — 86850 RBC ANTIBODY SCREEN: CPT

## 2019-06-26 PROCEDURE — 96375 TX/PRO/DX INJ NEW DRUG ADDON: CPT

## 2019-06-26 PROCEDURE — 6360000002 HC RX W HCPCS: Performed by: PHYSICIAN ASSISTANT

## 2019-06-26 PROCEDURE — 86901 BLOOD TYPING SEROLOGIC RH(D): CPT

## 2019-06-26 PROCEDURE — 86923 COMPATIBILITY TEST ELECTRIC: CPT

## 2019-06-26 PROCEDURE — P9016 RBC LEUKOCYTES REDUCED: HCPCS

## 2019-06-26 PROCEDURE — 96374 THER/PROPH/DIAG INJ IV PUSH: CPT

## 2019-06-26 RX ORDER — METHYLPREDNISOLONE SODIUM SUCCINATE 125 MG/2ML
125 INJECTION, POWDER, LYOPHILIZED, FOR SOLUTION INTRAMUSCULAR; INTRAVENOUS ONCE
Status: COMPLETED | OUTPATIENT
Start: 2019-06-26 | End: 2019-06-26

## 2019-06-26 RX ORDER — ACETAMINOPHEN 325 MG/1
650 TABLET ORAL ONCE
Status: COMPLETED | OUTPATIENT
Start: 2019-06-26 | End: 2019-06-26

## 2019-06-26 RX ORDER — FUROSEMIDE 10 MG/ML
20 INJECTION INTRAMUSCULAR; INTRAVENOUS ONCE
Status: COMPLETED | OUTPATIENT
Start: 2019-06-26 | End: 2019-06-26

## 2019-06-26 RX ORDER — SODIUM CHLORIDE 0.9 % (FLUSH) 0.9 %
20 SYRINGE (ML) INJECTION PRN
Status: DISCONTINUED | OUTPATIENT
Start: 2019-06-26 | End: 2019-06-28 | Stop reason: HOSPADM

## 2019-06-26 RX ORDER — HEPARIN SODIUM (PORCINE) LOCK FLUSH IV SOLN 100 UNIT/ML 100 UNIT/ML
300 SOLUTION INTRAVENOUS PRN
Status: DISCONTINUED | OUTPATIENT
Start: 2019-06-26 | End: 2019-06-28 | Stop reason: HOSPADM

## 2019-06-26 RX ORDER — DIPHENHYDRAMINE HYDROCHLORIDE 50 MG/ML
25 INJECTION INTRAMUSCULAR; INTRAVENOUS ONCE
Status: COMPLETED | OUTPATIENT
Start: 2019-06-26 | End: 2019-06-26

## 2019-06-26 RX ORDER — 0.9 % SODIUM CHLORIDE 0.9 %
250 INTRAVENOUS SOLUTION INTRAVENOUS ONCE
Status: COMPLETED | OUTPATIENT
Start: 2019-06-26 | End: 2019-06-26

## 2019-06-26 RX ADMIN — Medication 20 ML: at 12:05

## 2019-06-26 RX ADMIN — SODIUM CHLORIDE 250 ML: 9 INJECTION, SOLUTION INTRAVENOUS at 07:10

## 2019-06-26 RX ADMIN — METHYLPREDNISOLONE SODIUM SUCCINATE 125 MG: 125 INJECTION, POWDER, FOR SOLUTION INTRAMUSCULAR; INTRAVENOUS at 07:27

## 2019-06-26 RX ADMIN — FUROSEMIDE 20 MG: 10 INJECTION, SOLUTION INTRAMUSCULAR; INTRAVENOUS at 10:00

## 2019-06-26 RX ADMIN — ACETAMINOPHEN 650 MG: 325 TABLET ORAL at 07:27

## 2019-06-26 RX ADMIN — SODIUM CHLORIDE, PRESERVATIVE FREE 300 UNITS: 5 INJECTION INTRAVENOUS at 12:06

## 2019-06-26 RX ADMIN — DIPHENHYDRAMINE HYDROCHLORIDE 25 MG: 50 INJECTION, SOLUTION INTRAMUSCULAR; INTRAVENOUS at 07:28

## 2019-06-26 ASSESSMENT — PAIN SCALES - GENERAL: PAINLEVEL_OUTOF10: 0

## 2019-06-27 ENCOUNTER — HOSPITAL ENCOUNTER (OUTPATIENT)
Dept: CT IMAGING | Facility: HOSPITAL | Age: 79
Discharge: HOME | End: 2019-06-27
Admitting: INTERNAL MEDICINE

## 2019-06-27 DIAGNOSIS — C34.11 MALIGNANT NEOPLASM OF UPPER LOBE OF RIGHT LUNG (HCC): ICD-10-CM

## 2019-06-27 PROCEDURE — 74176 CT ABD & PELVIS W/O CONTRAST: CPT

## 2019-06-27 PROCEDURE — 71250 CT THORAX DX C-: CPT

## 2019-06-28 ENCOUNTER — READMISSION MANAGEMENT (OUTPATIENT)
Dept: CALL CENTER | Facility: HOSPITAL | Age: 79
End: 2019-06-28

## 2019-06-28 NOTE — OUTREACH NOTE
Medical Week 4 Survey      Responses   Facility patient discharged from?  [place]   Does the patient have one of the following disease processes/diagnoses(primary or secondary)?  Other   Week 4 attempt successful?  No          Cecily Jones RN

## 2019-07-01 ENCOUNTER — HOSPITAL ENCOUNTER (OUTPATIENT)
Age: 79
Discharge: HOME | End: 2019-07-01
Payer: MEDICARE

## 2019-07-01 PROCEDURE — 85025 COMPLETE CBC W/AUTO DIFF WBC: CPT

## 2019-07-02 ENCOUNTER — TELEPHONE (OUTPATIENT)
Dept: GASTROENTEROLOGY | Facility: CLINIC | Age: 79
End: 2019-07-02

## 2019-07-05 ENCOUNTER — HOSPITAL ENCOUNTER (OUTPATIENT)
Dept: INFUSION THERAPY | Age: 79
Discharge: HOME | End: 2019-07-05
Payer: MEDICARE

## 2019-07-05 DIAGNOSIS — C34.90 NON-SMALL CELL LUNG CANCER, UNSPECIFIED LATERALITY (HCC): ICD-10-CM

## 2019-07-05 DIAGNOSIS — C34.11 MALIGNANT NEOPLASM OF UPPER LOBE, RIGHT BRONCHUS OR LUNG (HCC): Primary | ICD-10-CM

## 2019-07-05 PROCEDURE — 85025 COMPLETE CBC W/AUTO DIFF WBC: CPT

## 2019-07-05 PROCEDURE — 96417 CHEMO IV INFUS EACH ADDL SEQ: CPT

## 2019-07-05 PROCEDURE — 96375 TX/PRO/DX INJ NEW DRUG ADDON: CPT

## 2019-07-05 PROCEDURE — 2580000003 HC RX 258: Performed by: INTERNAL MEDICINE

## 2019-07-05 PROCEDURE — 96413 CHEMO IV INFUSION 1 HR: CPT

## 2019-07-05 PROCEDURE — 6360000002 HC RX W HCPCS: Performed by: INTERNAL MEDICINE

## 2019-07-05 RX ORDER — SODIUM CHLORIDE 9 MG/ML
20 INJECTION, SOLUTION INTRAVENOUS ONCE
Status: DISCONTINUED | OUTPATIENT
Start: 2019-07-05 | End: 2019-07-07 | Stop reason: HOSPADM

## 2019-07-05 RX ORDER — METHYLPREDNISOLONE SODIUM SUCCINATE 125 MG/2ML
125 INJECTION, POWDER, LYOPHILIZED, FOR SOLUTION INTRAMUSCULAR; INTRAVENOUS PRN
Status: DISCONTINUED | OUTPATIENT
Start: 2019-07-05 | End: 2019-07-07 | Stop reason: HOSPADM

## 2019-07-05 RX ORDER — DIPHENHYDRAMINE HYDROCHLORIDE 50 MG/ML
50 INJECTION INTRAMUSCULAR; INTRAVENOUS PRN
Status: DISCONTINUED | OUTPATIENT
Start: 2019-07-05 | End: 2019-07-07 | Stop reason: HOSPADM

## 2019-07-05 RX ORDER — EPINEPHRINE 1 MG/ML
0.3 INJECTION, SOLUTION, CONCENTRATE INTRAVENOUS PRN
Status: DISCONTINUED | OUTPATIENT
Start: 2019-07-05 | End: 2019-07-07 | Stop reason: HOSPADM

## 2019-07-05 RX ORDER — SODIUM CHLORIDE 0.9 % (FLUSH) 0.9 %
10 SYRINGE (ML) INJECTION PRN
Status: DISCONTINUED | OUTPATIENT
Start: 2019-07-05 | End: 2019-07-06 | Stop reason: HOSPADM

## 2019-07-05 RX ORDER — HEPARIN SODIUM (PORCINE) LOCK FLUSH IV SOLN 100 UNIT/ML 100 UNIT/ML
500 SOLUTION INTRAVENOUS PRN
Status: DISCONTINUED | OUTPATIENT
Start: 2019-07-05 | End: 2019-07-06 | Stop reason: HOSPADM

## 2019-07-05 RX ORDER — DEXAMETHASONE SODIUM PHOSPHATE 10 MG/ML
10 INJECTION, SOLUTION INTRAMUSCULAR; INTRAVENOUS ONCE
Status: DISCONTINUED | OUTPATIENT
Start: 2019-07-05 | End: 2019-07-07 | Stop reason: HOSPADM

## 2019-07-05 RX ORDER — PALONOSETRON 0.05 MG/ML
0.25 INJECTION, SOLUTION INTRAVENOUS ONCE
Status: DISCONTINUED | OUTPATIENT
Start: 2019-07-05 | End: 2019-07-07 | Stop reason: HOSPADM

## 2019-07-05 RX ORDER — SODIUM CHLORIDE 0.9 % (FLUSH) 0.9 %
5 SYRINGE (ML) INJECTION PRN
Status: DISCONTINUED | OUTPATIENT
Start: 2019-07-05 | End: 2019-07-06 | Stop reason: HOSPADM

## 2019-07-12 ENCOUNTER — HOSPITAL ENCOUNTER (OUTPATIENT)
Dept: INFUSION THERAPY | Age: 79
Discharge: HOME | End: 2019-07-12
Payer: MEDICARE

## 2019-07-12 PROCEDURE — 85025 COMPLETE CBC W/AUTO DIFF WBC: CPT

## 2019-07-17 ENCOUNTER — LAB REQUISITION (OUTPATIENT)
Dept: LAB | Facility: HOSPITAL | Age: 79
End: 2019-07-17

## 2019-07-17 ENCOUNTER — HOSPITAL ENCOUNTER (OUTPATIENT)
Age: 79
Discharge: HOME | End: 2019-07-17
Payer: MEDICARE

## 2019-07-17 DIAGNOSIS — Z00.00 ENCOUNTER FOR GENERAL ADULT MEDICAL EXAMINATION WITHOUT ABNORMAL FINDINGS: ICD-10-CM

## 2019-07-17 PROCEDURE — 86900 BLOOD TYPING SEROLOGIC ABO: CPT

## 2019-07-17 PROCEDURE — 85025 COMPLETE CBC W/AUTO DIFF WBC: CPT

## 2019-07-17 PROCEDURE — 86923 COMPATIBILITY TEST ELECTRIC: CPT

## 2019-07-17 PROCEDURE — 86901 BLOOD TYPING SEROLOGIC RH(D): CPT

## 2019-07-17 PROCEDURE — 86901 BLOOD TYPING SEROLOGIC RH(D): CPT | Performed by: INTERNAL MEDICINE

## 2019-07-17 PROCEDURE — 86900 BLOOD TYPING SEROLOGIC ABO: CPT | Performed by: INTERNAL MEDICINE

## 2019-07-17 PROCEDURE — 86850 RBC ANTIBODY SCREEN: CPT | Performed by: INTERNAL MEDICINE

## 2019-07-18 ENCOUNTER — TRANSCRIBE ORDERS (OUTPATIENT)
Dept: ADMINISTRATIVE | Facility: HOSPITAL | Age: 79
End: 2019-07-18

## 2019-07-18 DIAGNOSIS — D59.19: ICD-10-CM

## 2019-07-18 DIAGNOSIS — C34.90 MALIGNANT NEOPLASM OF LUNG, UNSPECIFIED LATERALITY, UNSPECIFIED PART OF LUNG (HCC): Primary | ICD-10-CM

## 2019-07-19 ENCOUNTER — INFUSION (OUTPATIENT)
Dept: ONCOLOGY | Facility: HOSPITAL | Age: 79
End: 2019-07-19

## 2019-07-19 ENCOUNTER — LAB (OUTPATIENT)
Dept: LAB | Facility: HOSPITAL | Age: 79
End: 2019-07-19

## 2019-07-19 VITALS
SYSTOLIC BLOOD PRESSURE: 144 MMHG | HEIGHT: 64 IN | RESPIRATION RATE: 18 BRPM | DIASTOLIC BLOOD PRESSURE: 81 MMHG | BODY MASS INDEX: 26.98 KG/M2 | HEART RATE: 74 BPM | WEIGHT: 158 LBS | OXYGEN SATURATION: 96 % | TEMPERATURE: 98 F

## 2019-07-19 DIAGNOSIS — C34.90 MALIGNANT NEOPLASM OF LUNG, UNSPECIFIED LATERALITY, UNSPECIFIED PART OF LUNG (HCC): ICD-10-CM

## 2019-07-19 DIAGNOSIS — D50.8 OTHER IRON DEFICIENCY ANEMIA: Primary | ICD-10-CM

## 2019-07-19 DIAGNOSIS — D59.19: ICD-10-CM

## 2019-07-19 DIAGNOSIS — C34.11 MALIGNANT NEOPLASM OF UPPER LOBE OF RIGHT LUNG (HCC): ICD-10-CM

## 2019-07-19 LAB
DEPRECATED RDW RBC AUTO: 71.7 FL (ref 40–54)
ERYTHROCYTE [DISTWIDTH] IN BLOOD BY AUTOMATED COUNT: 22.6 % (ref 12–15)
HCT VFR BLD AUTO: 28.3 % (ref 40–52)
HGB BLD-MCNC: 9.4 G/DL (ref 14–18)
MCH RBC QN AUTO: 31.3 PG (ref 28–32)
MCHC RBC AUTO-ENTMCNC: 33.2 G/DL (ref 33–36)
MCV RBC AUTO: 94.3 FL (ref 82–95)
PLATELET # BLD AUTO: 48 10*3/MM3 (ref 130–400)
PMV BLD AUTO: 12.2 FL (ref 6–12)
RBC # BLD AUTO: 3 10*6/MM3 (ref 4.8–5.9)
WBC NRBC COR # BLD: 1.46 10*3/MM3 (ref 4.8–10.8)

## 2019-07-19 PROCEDURE — 36430 TRANSFUSION BLD/BLD COMPNT: CPT

## 2019-07-19 PROCEDURE — 86900 BLOOD TYPING SEROLOGIC ABO: CPT

## 2019-07-19 PROCEDURE — 25010000002 DIPHENHYDRAMINE PER 50 MG: Performed by: INTERNAL MEDICINE

## 2019-07-19 PROCEDURE — 25010000002 METHYLPREDNISOLONE PER 125 MG: Performed by: INTERNAL MEDICINE

## 2019-07-19 PROCEDURE — 85027 COMPLETE CBC AUTOMATED: CPT

## 2019-07-19 PROCEDURE — P9016 RBC LEUKOCYTES REDUCED: HCPCS

## 2019-07-19 PROCEDURE — 96375 TX/PRO/DX INJ NEW DRUG ADDON: CPT

## 2019-07-19 PROCEDURE — 36415 COLL VENOUS BLD VENIPUNCTURE: CPT

## 2019-07-19 PROCEDURE — 96374 THER/PROPH/DIAG INJ IV PUSH: CPT

## 2019-07-19 RX ORDER — DIPHENHYDRAMINE HYDROCHLORIDE 50 MG/ML
25 INJECTION INTRAMUSCULAR; INTRAVENOUS ONCE
Start: 2019-07-19 | End: 2019-07-19

## 2019-07-19 RX ORDER — METHYLPREDNISOLONE SODIUM SUCCINATE 125 MG/2ML
125 INJECTION, POWDER, LYOPHILIZED, FOR SOLUTION INTRAMUSCULAR; INTRAVENOUS ONCE
Status: COMPLETED | OUTPATIENT
Start: 2019-07-19 | End: 2019-07-19

## 2019-07-19 RX ORDER — METHYLPREDNISOLONE SODIUM SUCCINATE 125 MG/2ML
125 INJECTION, POWDER, LYOPHILIZED, FOR SOLUTION INTRAMUSCULAR; INTRAVENOUS ONCE
Start: 2019-07-19 | End: 2019-07-19

## 2019-07-19 RX ORDER — SODIUM CHLORIDE 0.9 % (FLUSH) 0.9 %
10 SYRINGE (ML) INJECTION AS NEEDED
OUTPATIENT
Start: 2019-07-19

## 2019-07-19 RX ORDER — HEPARIN SODIUM (PORCINE) LOCK FLUSH IV SOLN 100 UNIT/ML 100 UNIT/ML
500 SOLUTION INTRAVENOUS AS NEEDED
OUTPATIENT
Start: 2019-07-19

## 2019-07-19 RX ORDER — DIPHENHYDRAMINE HYDROCHLORIDE 50 MG/ML
25 INJECTION INTRAMUSCULAR; INTRAVENOUS ONCE
Status: COMPLETED | OUTPATIENT
Start: 2019-07-19 | End: 2019-07-19

## 2019-07-19 RX ORDER — SODIUM CHLORIDE 9 MG/ML
250 INJECTION, SOLUTION INTRAVENOUS AS NEEDED
Status: DISCONTINUED | OUTPATIENT
Start: 2019-07-19 | End: 2019-07-19 | Stop reason: HOSPADM

## 2019-07-19 RX ORDER — HEPARIN SODIUM (PORCINE) LOCK FLUSH IV SOLN 100 UNIT/ML 100 UNIT/ML
300 SOLUTION INTRAVENOUS ONCE
OUTPATIENT
Start: 2019-07-19

## 2019-07-19 RX ORDER — ACETAMINOPHEN 325 MG/1
650 TABLET ORAL ONCE
Start: 2019-07-19 | End: 2019-07-19

## 2019-07-19 RX ORDER — ACETAMINOPHEN 325 MG/1
650 TABLET ORAL ONCE
Status: COMPLETED | OUTPATIENT
Start: 2019-07-19 | End: 2019-07-19

## 2019-07-19 RX ORDER — SODIUM CHLORIDE 0.9 % (FLUSH) 0.9 %
10 SYRINGE (ML) INJECTION AS NEEDED
Status: DISCONTINUED | OUTPATIENT
Start: 2019-07-19 | End: 2019-07-19 | Stop reason: HOSPADM

## 2019-07-19 RX ADMIN — DIPHENHYDRAMINE HYDROCHLORIDE 25 MG: 50 INJECTION, SOLUTION INTRAMUSCULAR; INTRAVENOUS at 08:17

## 2019-07-19 RX ADMIN — METHYLPREDNISOLONE SODIUM SUCCINATE 125 MG: 125 INJECTION, POWDER, FOR SOLUTION INTRAMUSCULAR; INTRAVENOUS at 08:21

## 2019-07-19 RX ADMIN — Medication 500 UNITS: at 12:32

## 2019-07-19 RX ADMIN — ACETAMINOPHEN 650 MG: 325 TABLET, FILM COATED ORAL at 08:17

## 2019-07-19 RX ADMIN — SODIUM CHLORIDE, PRESERVATIVE FREE 10 ML: 5 INJECTION INTRAVENOUS at 12:32

## 2019-07-20 LAB
ABO + RH BLD: NORMAL
BH BB BLOOD EXPIRATION DATE: NORMAL
BH BB BLOOD TYPE BARCODE: 5100
BH BB DISPENSE STATUS: NORMAL
BH BB PRODUCT CODE: NORMAL
BH BB UNIT NUMBER: NORMAL
UNIT  ABO: NORMAL
UNIT  RH: NORMAL

## 2019-07-26 ENCOUNTER — HOSPITAL ENCOUNTER (OUTPATIENT)
Dept: INFUSION THERAPY | Age: 79
Discharge: HOME | End: 2019-07-26
Payer: MEDICARE

## 2019-07-26 PROCEDURE — 6360000002 HC RX W HCPCS: Performed by: PHYSICIAN ASSISTANT

## 2019-07-26 PROCEDURE — 2580000003 HC RX 258: Performed by: PHYSICIAN ASSISTANT

## 2019-07-26 PROCEDURE — 96365 THER/PROPH/DIAG IV INF INIT: CPT

## 2019-07-26 PROCEDURE — 85025 COMPLETE CBC W/AUTO DIFF WBC: CPT

## 2019-08-02 ENCOUNTER — HOSPITAL ENCOUNTER (OUTPATIENT)
Dept: INFUSION THERAPY | Age: 79
Discharge: HOME | End: 2019-08-02
Payer: MEDICARE

## 2019-08-02 DIAGNOSIS — C34.11 MALIGNANT NEOPLASM OF UPPER LOBE, RIGHT BRONCHUS OR LUNG (HCC): Primary | ICD-10-CM

## 2019-08-02 DIAGNOSIS — C34.90 NON-SMALL CELL LUNG CANCER, UNSPECIFIED LATERALITY (HCC): ICD-10-CM

## 2019-08-02 PROCEDURE — 6360000002 HC RX W HCPCS: Performed by: PHYSICIAN ASSISTANT

## 2019-08-02 PROCEDURE — 80053 COMPREHEN METABOLIC PANEL: CPT

## 2019-08-02 PROCEDURE — 96413 CHEMO IV INFUSION 1 HR: CPT

## 2019-08-02 PROCEDURE — 96375 TX/PRO/DX INJ NEW DRUG ADDON: CPT

## 2019-08-02 PROCEDURE — 96417 CHEMO IV INFUS EACH ADDL SEQ: CPT

## 2019-08-02 PROCEDURE — 85025 COMPLETE CBC W/AUTO DIFF WBC: CPT

## 2019-08-02 PROCEDURE — 2580000003 HC RX 258: Performed by: PHYSICIAN ASSISTANT

## 2019-08-02 RX ORDER — SODIUM CHLORIDE 0.9 % (FLUSH) 0.9 %
10 SYRINGE (ML) INJECTION PRN
Status: DISCONTINUED | OUTPATIENT
Start: 2019-08-02 | End: 2019-08-03 | Stop reason: HOSPADM

## 2019-08-02 RX ORDER — EPINEPHRINE 1 MG/ML
0.3 INJECTION, SOLUTION, CONCENTRATE INTRAVENOUS PRN
Status: DISCONTINUED | OUTPATIENT
Start: 2019-08-02 | End: 2019-08-04 | Stop reason: HOSPADM

## 2019-08-02 RX ORDER — HEPARIN SODIUM (PORCINE) LOCK FLUSH IV SOLN 100 UNIT/ML 100 UNIT/ML
500 SOLUTION INTRAVENOUS PRN
Status: DISCONTINUED | OUTPATIENT
Start: 2019-08-02 | End: 2019-08-03 | Stop reason: HOSPADM

## 2019-08-02 RX ORDER — SODIUM CHLORIDE 0.9 % (FLUSH) 0.9 %
5 SYRINGE (ML) INJECTION PRN
Status: DISCONTINUED | OUTPATIENT
Start: 2019-08-02 | End: 2019-08-03 | Stop reason: HOSPADM

## 2019-08-02 RX ORDER — METHYLPREDNISOLONE SODIUM SUCCINATE 125 MG/2ML
125 INJECTION, POWDER, LYOPHILIZED, FOR SOLUTION INTRAMUSCULAR; INTRAVENOUS PRN
Status: DISCONTINUED | OUTPATIENT
Start: 2019-08-02 | End: 2019-08-04 | Stop reason: HOSPADM

## 2019-08-02 RX ORDER — SODIUM CHLORIDE 9 MG/ML
20 INJECTION, SOLUTION INTRAVENOUS ONCE
Status: DISCONTINUED | OUTPATIENT
Start: 2019-08-02 | End: 2019-08-04 | Stop reason: HOSPADM

## 2019-08-02 RX ORDER — DIPHENHYDRAMINE HYDROCHLORIDE 50 MG/ML
50 INJECTION INTRAMUSCULAR; INTRAVENOUS PRN
Status: DISCONTINUED | OUTPATIENT
Start: 2019-08-02 | End: 2019-08-04 | Stop reason: HOSPADM

## 2019-08-02 RX ORDER — PALONOSETRON 0.05 MG/ML
0.25 INJECTION, SOLUTION INTRAVENOUS ONCE
Status: DISCONTINUED | OUTPATIENT
Start: 2019-08-02 | End: 2019-08-04 | Stop reason: HOSPADM

## 2019-08-02 RX ORDER — DEXAMETHASONE SODIUM PHOSPHATE 10 MG/ML
10 INJECTION, SOLUTION INTRAMUSCULAR; INTRAVENOUS ONCE
Status: DISCONTINUED | OUTPATIENT
Start: 2019-08-02 | End: 2019-08-04 | Stop reason: HOSPADM

## 2019-08-10 ENCOUNTER — APPOINTMENT (OUTPATIENT)
Dept: CT IMAGING | Facility: HOSPITAL | Age: 79
End: 2019-08-10

## 2019-08-10 ENCOUNTER — APPOINTMENT (OUTPATIENT)
Dept: NUCLEAR MEDICINE | Facility: HOSPITAL | Age: 79
End: 2019-08-10

## 2019-08-10 ENCOUNTER — APPOINTMENT (OUTPATIENT)
Dept: GENERAL RADIOLOGY | Facility: HOSPITAL | Age: 79
End: 2019-08-10

## 2019-08-10 ENCOUNTER — HOSPITAL ENCOUNTER (INPATIENT)
Facility: HOSPITAL | Age: 79
LOS: 1 days | Discharge: HOME | End: 2019-08-11
Attending: EMERGENCY MEDICINE | Admitting: FAMILY MEDICINE

## 2019-08-10 DIAGNOSIS — I26.99 OTHER PULMONARY EMBOLISM WITHOUT ACUTE COR PULMONALE, UNSPECIFIED CHRONICITY (HCC): ICD-10-CM

## 2019-08-10 DIAGNOSIS — I47.1 SVT (SUPRAVENTRICULAR TACHYCARDIA) (HCC): ICD-10-CM

## 2019-08-10 DIAGNOSIS — J18.9 PNEUMONIA DUE TO INFECTIOUS ORGANISM, UNSPECIFIED LATERALITY, UNSPECIFIED PART OF LUNG: Primary | ICD-10-CM

## 2019-08-10 DIAGNOSIS — N17.9 AKI (ACUTE KIDNEY INJURY) (HCC): ICD-10-CM

## 2019-08-10 DIAGNOSIS — I44.7 LEFT BUNDLE BRANCH BLOCK: ICD-10-CM

## 2019-08-10 LAB
ANION GAP SERPL CALCULATED.3IONS-SCNC: 11 MMOL/L (ref 4–13)
APTT PPP: 38.6 SECONDS (ref 24.1–35)
ARTERIAL PATENCY WRIST A: POSITIVE
ATMOSPHERIC PRESS: 749 MMHG
BACTERIA UR QL AUTO: ABNORMAL /HPF
BASE EXCESS BLDA CALC-SCNC: -1.9 MMOL/L (ref 0–2)
BASOPHILS # BLD AUTO: 0.01 10*3/MM3 (ref 0–0.2)
BASOPHILS NFR BLD AUTO: 0.3 % (ref 0–1.5)
BDY SITE: ABNORMAL
BILIRUB UR QL STRIP: NEGATIVE
BODY TEMPERATURE: 37 C
BUN BLD-MCNC: 45 MG/DL (ref 5–21)
BUN/CREAT SERPL: 20.5 (ref 7–25)
CALCIUM SPEC-SCNC: 10.4 MG/DL (ref 8.4–10.4)
CHLORIDE SERPL-SCNC: 102 MMOL/L (ref 98–110)
CK SERPL-CCNC: 27 U/L (ref 0–203)
CLARITY UR: CLEAR
CO2 SERPL-SCNC: 23 MMOL/L (ref 24–31)
COLOR UR: YELLOW
CREAT BLD-MCNC: 2.19 MG/DL (ref 0.5–1.4)
D-LACTATE SERPL-SCNC: 2.7 MMOL/L (ref 0.5–2)
DEPRECATED RDW RBC AUTO: 86.3 FL (ref 37–54)
EOSINOPHIL # BLD AUTO: 0.07 10*3/MM3 (ref 0–0.4)
EOSINOPHIL NFR BLD AUTO: 2.2 % (ref 0.3–6.2)
ERYTHROCYTE [DISTWIDTH] IN BLOOD BY AUTOMATED COUNT: 23.5 % (ref 12.3–15.4)
GAS FLOW AIRWAY: 2 LPM
GFR SERPL CREATININE-BSD FRML MDRD: 29 ML/MIN/1.73
GLUCOSE BLD-MCNC: 203 MG/DL (ref 70–100)
GLUCOSE UR STRIP-MCNC: NEGATIVE MG/DL
HCO3 BLDA-SCNC: 21.7 MMOL/L (ref 20–26)
HCT VFR BLD AUTO: 30.6 % (ref 37.5–51)
HGB BLD-MCNC: 10.2 G/DL (ref 13–17.7)
HGB UR QL STRIP.AUTO: NEGATIVE
HOLD SPECIMEN: NORMAL
HOLD SPECIMEN: NORMAL
HYALINE CASTS UR QL AUTO: ABNORMAL /LPF
IMM GRANULOCYTES # BLD AUTO: 0.07 10*3/MM3 (ref 0–0.05)
IMM GRANULOCYTES NFR BLD AUTO: 2.2 % (ref 0–0.5)
INR PPP: 1.03 (ref 0.91–1.09)
KETONES UR QL STRIP: NEGATIVE
LEUKOCYTE ESTERASE UR QL STRIP.AUTO: ABNORMAL
LYMPHOCYTES # BLD AUTO: 1.18 10*3/MM3 (ref 0.7–3.1)
LYMPHOCYTES NFR BLD AUTO: 37.1 % (ref 19.6–45.3)
Lab: ABNORMAL
MAGNESIUM SERPL-MCNC: 2.2 MG/DL (ref 1.4–2.2)
MCH RBC QN AUTO: 33.6 PG (ref 26.6–33)
MCHC RBC AUTO-ENTMCNC: 33.3 G/DL (ref 31.5–35.7)
MCV RBC AUTO: 100.7 FL (ref 79–97)
MODALITY: ABNORMAL
MONOCYTES # BLD AUTO: 0.17 10*3/MM3 (ref 0.1–0.9)
MONOCYTES NFR BLD AUTO: 5.3 % (ref 5–12)
NEUTROPHILS # BLD AUTO: 1.68 10*3/MM3 (ref 1.7–7)
NEUTROPHILS NFR BLD AUTO: 52.9 % (ref 42.7–76)
NITRITE UR QL STRIP: NEGATIVE
NRBC BLD AUTO-RTO: 0 /100 WBC (ref 0–0.2)
NT-PROBNP SERPL-MCNC: 662 PG/ML (ref 0–1800)
PCO2 BLDA: 32 MM HG (ref 35–45)
PH BLDA: 7.44 PH UNITS (ref 7.35–7.45)
PH UR STRIP.AUTO: 7 [PH] (ref 5–8)
PHOSPHATE SERPL-MCNC: 4 MG/DL (ref 2.5–4.5)
PLATELET # BLD AUTO: 110 10*3/MM3 (ref 140–450)
PMV BLD AUTO: 10.7 FL (ref 6–12)
PO2 BLDA: 74.3 MM HG (ref 83–108)
POTASSIUM BLD-SCNC: 4.8 MMOL/L (ref 3.5–5.3)
PROCALCITONIN SERPL-MCNC: 0.7 NG/ML
PROT UR QL STRIP: ABNORMAL
PROTHROMBIN TIME: 13.8 SECONDS (ref 11.9–14.6)
RBC # BLD AUTO: 3.04 10*6/MM3 (ref 4.14–5.8)
RBC # UR: ABNORMAL /HPF
REF LAB TEST METHOD: ABNORMAL
SAO2 % BLDCOA: 96 % (ref 94–99)
SODIUM BLD-SCNC: 136 MMOL/L (ref 135–145)
SP GR UR STRIP: 1.02 (ref 1–1.03)
SQUAMOUS #/AREA URNS HPF: ABNORMAL /HPF
T4 FREE SERPL-MCNC: 1.67 NG/DL (ref 0.78–2.19)
TROPONIN I SERPL-MCNC: 0.03 NG/ML (ref 0–0.03)
TROPONIN I SERPL-MCNC: <0.012 NG/ML (ref 0–0.03)
TSH SERPL DL<=0.05 MIU/L-ACNC: 2.6 MIU/ML (ref 0.47–4.68)
UROBILINOGEN UR QL STRIP: ABNORMAL
VENTILATOR MODE: ABNORMAL
WBC NRBC COR # BLD: 3.18 10*3/MM3 (ref 3.4–10.8)
WBC UR QL AUTO: ABNORMAL /HPF
WHOLE BLOOD HOLD SPECIMEN: NORMAL
WHOLE BLOOD HOLD SPECIMEN: NORMAL

## 2019-08-10 PROCEDURE — 85610 PROTHROMBIN TIME: CPT | Performed by: EMERGENCY MEDICINE

## 2019-08-10 PROCEDURE — 93005 ELECTROCARDIOGRAM TRACING: CPT | Performed by: EMERGENCY MEDICINE

## 2019-08-10 PROCEDURE — 78582 LUNG VENTILAT&PERFUS IMAGING: CPT

## 2019-08-10 PROCEDURE — 83880 ASSAY OF NATRIURETIC PEPTIDE: CPT | Performed by: EMERGENCY MEDICINE

## 2019-08-10 PROCEDURE — A9558 XE133 XENON 10MCI: HCPCS | Performed by: EMERGENCY MEDICINE

## 2019-08-10 PROCEDURE — 84443 ASSAY THYROID STIM HORMONE: CPT | Performed by: EMERGENCY MEDICINE

## 2019-08-10 PROCEDURE — 0 XENON XE 133: Performed by: EMERGENCY MEDICINE

## 2019-08-10 PROCEDURE — 71250 CT THORAX DX C-: CPT

## 2019-08-10 PROCEDURE — 71045 X-RAY EXAM CHEST 1 VIEW: CPT

## 2019-08-10 PROCEDURE — 84100 ASSAY OF PHOSPHORUS: CPT | Performed by: EMERGENCY MEDICINE

## 2019-08-10 PROCEDURE — 83605 ASSAY OF LACTIC ACID: CPT | Performed by: EMERGENCY MEDICINE

## 2019-08-10 PROCEDURE — 85025 COMPLETE CBC W/AUTO DIFF WBC: CPT | Performed by: EMERGENCY MEDICINE

## 2019-08-10 PROCEDURE — A9540 TC99M MAA: HCPCS | Performed by: EMERGENCY MEDICINE

## 2019-08-10 PROCEDURE — 84145 PROCALCITONIN (PCT): CPT | Performed by: EMERGENCY MEDICINE

## 2019-08-10 PROCEDURE — 93010 ELECTROCARDIOGRAM REPORT: CPT | Performed by: INTERNAL MEDICINE

## 2019-08-10 PROCEDURE — 0 TECHNETIUM ALBUMIN AGGREGATED: Performed by: EMERGENCY MEDICINE

## 2019-08-10 PROCEDURE — 96372 THER/PROPH/DIAG INJ SC/IM: CPT

## 2019-08-10 PROCEDURE — 25010000002 VANCOMYCIN 10 G RECONSTITUTED SOLUTION: Performed by: EMERGENCY MEDICINE

## 2019-08-10 PROCEDURE — 82550 ASSAY OF CK (CPK): CPT | Performed by: EMERGENCY MEDICINE

## 2019-08-10 PROCEDURE — 99284 EMERGENCY DEPT VISIT MOD MDM: CPT

## 2019-08-10 PROCEDURE — 80048 BASIC METABOLIC PNL TOTAL CA: CPT | Performed by: EMERGENCY MEDICINE

## 2019-08-10 PROCEDURE — 81001 URINALYSIS AUTO W/SCOPE: CPT | Performed by: EMERGENCY MEDICINE

## 2019-08-10 PROCEDURE — 87040 BLOOD CULTURE FOR BACTERIA: CPT | Performed by: EMERGENCY MEDICINE

## 2019-08-10 PROCEDURE — 36600 WITHDRAWAL OF ARTERIAL BLOOD: CPT

## 2019-08-10 PROCEDURE — 82803 BLOOD GASES ANY COMBINATION: CPT

## 2019-08-10 PROCEDURE — 83735 ASSAY OF MAGNESIUM: CPT | Performed by: EMERGENCY MEDICINE

## 2019-08-10 PROCEDURE — 25010000002 ENOXAPARIN PER 10 MG: Performed by: EMERGENCY MEDICINE

## 2019-08-10 PROCEDURE — 84439 ASSAY OF FREE THYROXINE: CPT | Performed by: EMERGENCY MEDICINE

## 2019-08-10 PROCEDURE — 85730 THROMBOPLASTIN TIME PARTIAL: CPT | Performed by: EMERGENCY MEDICINE

## 2019-08-10 PROCEDURE — 84484 ASSAY OF TROPONIN QUANT: CPT | Performed by: EMERGENCY MEDICINE

## 2019-08-10 RX ORDER — ASPIRIN 81 MG/1
324 TABLET, CHEWABLE ORAL ONCE
Status: DISCONTINUED | OUTPATIENT
Start: 2019-08-10 | End: 2019-08-10

## 2019-08-10 RX ADMIN — SODIUM CHLORIDE 2 G: 900 INJECTION INTRAVENOUS at 20:11

## 2019-08-10 RX ADMIN — VANCOMYCIN HYDROCHLORIDE 1500 MG: 10 INJECTION, POWDER, LYOPHILIZED, FOR SOLUTION INTRAVENOUS at 20:21

## 2019-08-10 RX ADMIN — ENOXAPARIN SODIUM 70 MG: 80 INJECTION SUBCUTANEOUS at 23:30

## 2019-08-10 RX ADMIN — XENON XE-133 15 MILLICURIE: 10 GAS RESPIRATORY (INHALATION) at 21:35

## 2019-08-10 RX ADMIN — Medication 1 DOSE: at 21:38

## 2019-08-10 RX ADMIN — SODIUM CHLORIDE 1000 ML: 9 INJECTION, SOLUTION INTRAVENOUS at 20:11

## 2019-08-11 VITALS
SYSTOLIC BLOOD PRESSURE: 118 MMHG | RESPIRATION RATE: 16 BRPM | HEART RATE: 85 BPM | TEMPERATURE: 98.6 F | BODY MASS INDEX: 27.28 KG/M2 | DIASTOLIC BLOOD PRESSURE: 63 MMHG | HEIGHT: 64 IN | OXYGEN SATURATION: 95 % | WEIGHT: 159.8 LBS

## 2019-08-11 LAB
ALBUMIN SERPL-MCNC: 2.7 G/DL (ref 3.5–5)
ALBUMIN/GLOB SERPL: 0.8 G/DL (ref 1.1–2.5)
ALP SERPL-CCNC: 81 U/L (ref 24–120)
ALT SERPL W P-5'-P-CCNC: 34 U/L (ref 0–54)
ANION GAP SERPL CALCULATED.3IONS-SCNC: 5 MMOL/L (ref 4–13)
ANISOCYTOSIS BLD QL: ABNORMAL
AST SERPL-CCNC: 44 U/L (ref 7–45)
BILIRUB SERPL-MCNC: 0.6 MG/DL (ref 0.1–1)
BUN BLD-MCNC: 38 MG/DL (ref 5–21)
BUN/CREAT SERPL: 20.8 (ref 7–25)
CALCIUM SPEC-SCNC: 9.2 MG/DL (ref 8.4–10.4)
CHLORIDE SERPL-SCNC: 107 MMOL/L (ref 98–110)
CLUMPED PLATELETS: PRESENT
CO2 SERPL-SCNC: 23 MMOL/L (ref 24–31)
CREAT BLD-MCNC: 1.83 MG/DL (ref 0.5–1.4)
D-LACTATE SERPL-SCNC: 1.1 MMOL/L (ref 0.5–2)
D-LACTATE SERPL-SCNC: 1.3 MMOL/L (ref 0.5–2)
DEPRECATED RDW RBC AUTO: 82.9 FL (ref 37–54)
EOSINOPHIL # BLD MANUAL: 0.07 10*3/MM3 (ref 0–0.4)
EOSINOPHIL NFR BLD MANUAL: 3.1 % (ref 0.3–6.2)
ERYTHROCYTE [DISTWIDTH] IN BLOOD BY AUTOMATED COUNT: 23.2 % (ref 12.3–15.4)
GFR SERPL CREATININE-BSD FRML MDRD: 36 ML/MIN/1.73
GLOBULIN UR ELPH-MCNC: 3.3 GM/DL
GLUCOSE BLD-MCNC: 114 MG/DL (ref 70–100)
HCT VFR BLD AUTO: 24 % (ref 37.5–51)
HGB BLD-MCNC: 7.9 G/DL (ref 13–17.7)
HOLD SPECIMEN: NORMAL
HYPOCHROMIA BLD QL: ABNORMAL
LYMPHOCYTES # BLD MANUAL: 0.31 10*3/MM3 (ref 0.7–3.1)
LYMPHOCYTES NFR BLD MANUAL: 14.3 % (ref 19.6–45.3)
LYMPHOCYTES NFR BLD MANUAL: 5.1 % (ref 5–12)
MCH RBC QN AUTO: 33.2 PG (ref 26.6–33)
MCHC RBC AUTO-ENTMCNC: 32.9 G/DL (ref 31.5–35.7)
MCV RBC AUTO: 100.8 FL (ref 79–97)
MONOCYTES # BLD AUTO: 0.11 10*3/MM3 (ref 0.1–0.9)
NEUTROPHILS # BLD AUTO: 1.63 10*3/MM3 (ref 1.7–7)
NEUTROPHILS NFR BLD MANUAL: 74.5 % (ref 42.7–76)
NEUTS BAND NFR BLD MANUAL: 1 % (ref 0–5)
PLATELET # BLD AUTO: 78 10*3/MM3 (ref 140–450)
PMV BLD AUTO: 11.3 FL (ref 6–12)
POLYCHROMASIA BLD QL SMEAR: ABNORMAL
POTASSIUM BLD-SCNC: 4.2 MMOL/L (ref 3.5–5.3)
PROT SERPL-MCNC: 6 G/DL (ref 6.3–8.7)
RBC # BLD AUTO: 2.38 10*6/MM3 (ref 4.14–5.8)
SMALL PLATELETS BLD QL SMEAR: ABNORMAL
SODIUM BLD-SCNC: 135 MMOL/L (ref 135–145)
VARIANT LYMPHS NFR BLD MANUAL: 2 % (ref 0–5)
WBC MORPH BLD: NORMAL
WBC NRBC COR # BLD: 2.16 10*3/MM3 (ref 3.4–10.8)

## 2019-08-11 PROCEDURE — 94760 N-INVAS EAR/PLS OXIMETRY 1: CPT

## 2019-08-11 PROCEDURE — 85007 BL SMEAR W/DIFF WBC COUNT: CPT | Performed by: INTERNAL MEDICINE

## 2019-08-11 PROCEDURE — 94799 UNLISTED PULMONARY SVC/PX: CPT

## 2019-08-11 PROCEDURE — 87070 CULTURE OTHR SPECIMN AEROBIC: CPT | Performed by: INTERNAL MEDICINE

## 2019-08-11 PROCEDURE — 85025 COMPLETE CBC W/AUTO DIFF WBC: CPT | Performed by: INTERNAL MEDICINE

## 2019-08-11 PROCEDURE — 36415 COLL VENOUS BLD VENIPUNCTURE: CPT | Performed by: EMERGENCY MEDICINE

## 2019-08-11 PROCEDURE — 87205 SMEAR GRAM STAIN: CPT | Performed by: INTERNAL MEDICINE

## 2019-08-11 PROCEDURE — 83605 ASSAY OF LACTIC ACID: CPT | Performed by: EMERGENCY MEDICINE

## 2019-08-11 PROCEDURE — 80053 COMPREHEN METABOLIC PANEL: CPT | Performed by: INTERNAL MEDICINE

## 2019-08-11 RX ORDER — SODIUM CHLORIDE 0.9 % (FLUSH) 0.9 %
3 SYRINGE (ML) INJECTION EVERY 12 HOURS SCHEDULED
Status: DISCONTINUED | OUTPATIENT
Start: 2019-08-11 | End: 2019-08-11 | Stop reason: HOSPADM

## 2019-08-11 RX ORDER — PANTOPRAZOLE SODIUM 40 MG/1
40 TABLET, DELAYED RELEASE ORAL DAILY
Status: DISCONTINUED | OUTPATIENT
Start: 2019-08-11 | End: 2019-08-11 | Stop reason: HOSPADM

## 2019-08-11 RX ORDER — DOCUSATE SODIUM 100 MG/1
100 CAPSULE, LIQUID FILLED ORAL 2 TIMES DAILY
Status: DISCONTINUED | OUTPATIENT
Start: 2019-08-11 | End: 2019-08-11 | Stop reason: HOSPADM

## 2019-08-11 RX ORDER — MEGESTROL ACETATE 40 MG/ML
800 SUSPENSION ORAL DAILY
Status: DISCONTINUED | OUTPATIENT
Start: 2019-08-11 | End: 2019-08-11 | Stop reason: HOSPADM

## 2019-08-11 RX ORDER — LANOLIN ALCOHOL/MO/W.PET/CERES
5 CREAM (GRAM) TOPICAL NIGHTLY PRN
Status: DISCONTINUED | OUTPATIENT
Start: 2019-08-11 | End: 2019-08-11 | Stop reason: HOSPADM

## 2019-08-11 RX ORDER — ONDANSETRON 2 MG/ML
4 INJECTION INTRAMUSCULAR; INTRAVENOUS EVERY 6 HOURS PRN
Status: DISCONTINUED | OUTPATIENT
Start: 2019-08-11 | End: 2019-08-11 | Stop reason: HOSPADM

## 2019-08-11 RX ORDER — TAMSULOSIN HYDROCHLORIDE 0.4 MG/1
0.4 CAPSULE ORAL NIGHTLY
Status: DISCONTINUED | OUTPATIENT
Start: 2019-08-11 | End: 2019-08-11 | Stop reason: HOSPADM

## 2019-08-11 RX ORDER — MULTIVIT,CALC,MINS/IRON/FOLIC 9MG-400MCG
1 TABLET ORAL DAILY
Status: DISCONTINUED | OUTPATIENT
Start: 2019-08-11 | End: 2019-08-11 | Stop reason: HOSPADM

## 2019-08-11 RX ORDER — SUCRALFATE ORAL 1 G/10ML
2 SUSPENSION ORAL 2 TIMES DAILY
Status: DISCONTINUED | OUTPATIENT
Start: 2019-08-11 | End: 2019-08-11 | Stop reason: HOSPADM

## 2019-08-11 RX ORDER — FOLIC ACID 1 MG/1
1 TABLET ORAL DAILY
Status: DISCONTINUED | OUTPATIENT
Start: 2019-08-11 | End: 2019-08-11 | Stop reason: HOSPADM

## 2019-08-11 RX ORDER — SODIUM CHLORIDE 0.9 % (FLUSH) 0.9 %
3-10 SYRINGE (ML) INJECTION AS NEEDED
Status: DISCONTINUED | OUTPATIENT
Start: 2019-08-11 | End: 2019-08-11 | Stop reason: HOSPADM

## 2019-08-11 RX ORDER — FLECAINIDE ACETATE 50 MG/1
50 TABLET ORAL EVERY 12 HOURS SCHEDULED
Status: DISCONTINUED | OUTPATIENT
Start: 2019-08-11 | End: 2019-08-11 | Stop reason: HOSPADM

## 2019-08-11 RX ORDER — ACETAMINOPHEN 325 MG/1
650 TABLET ORAL EVERY 4 HOURS PRN
Status: DISCONTINUED | OUTPATIENT
Start: 2019-08-11 | End: 2019-08-11 | Stop reason: HOSPADM

## 2019-08-11 RX ORDER — GUAIFENESIN 600 MG/1
1200 TABLET, EXTENDED RELEASE ORAL EVERY 12 HOURS SCHEDULED
Status: DISCONTINUED | OUTPATIENT
Start: 2019-08-11 | End: 2019-08-11 | Stop reason: HOSPADM

## 2019-08-11 RX ORDER — SODIUM BICARBONATE 650 MG/1
650 TABLET ORAL 2 TIMES DAILY
Status: DISCONTINUED | OUTPATIENT
Start: 2019-08-11 | End: 2019-08-11 | Stop reason: HOSPADM

## 2019-08-11 RX ADMIN — MEGESTROL ACETATE 800 MG: 40 SUSPENSION ORAL at 08:10

## 2019-08-11 RX ADMIN — GUAIFENESIN 1200 MG: 600 TABLET, EXTENDED RELEASE ORAL at 08:12

## 2019-08-11 RX ADMIN — SUCRALFATE 2 G: 1 SUSPENSION ORAL at 08:11

## 2019-08-11 RX ADMIN — FLECAINIDE ACETATE 50 MG: 50 TABLET ORAL at 08:14

## 2019-08-11 RX ADMIN — Medication 1 TABLET: at 08:15

## 2019-08-11 RX ADMIN — GUAIFENESIN 1200 MG: 600 TABLET, EXTENDED RELEASE ORAL at 01:26

## 2019-08-11 RX ADMIN — FOLIC ACID 1 MG: 1 TABLET ORAL at 08:13

## 2019-08-11 RX ADMIN — AZTREONAM 1 G: 1 INJECTION, POWDER, FOR SOLUTION INTRAMUSCULAR; INTRAVENOUS at 03:07

## 2019-08-11 RX ADMIN — SODIUM BICARBONATE 650 MG: 650 TABLET, ORALLY DISINTEGRATING ORAL at 08:13

## 2019-08-11 RX ADMIN — DOCUSATE SODIUM 100 MG: 100 CAPSULE ORAL at 08:13

## 2019-08-11 RX ADMIN — METOPROLOL TARTRATE 12.5 MG: 25 TABLET, FILM COATED ORAL at 08:14

## 2019-08-11 RX ADMIN — PANTOPRAZOLE SODIUM 40 MG: 40 TABLET, DELAYED RELEASE ORAL at 08:12

## 2019-08-11 NOTE — DISCHARGE SUMMARY
[facility]  DISCHARGE SUMMARY       Date of Admission: 8/10/2019  Date of Discharge:  8/11/2019  Primary Care Physician: Irving Alexis MD    Discharge Diagnoses:  Patient Active Problem List   Diagnosis   • Dyspnea   • Essential hypertension   • NSVT (nonsustained ventricular tachycardia) (CMS/HCC)   • Malignant neoplasm of upper lobe of right lung (CMS/HCC)   • Stage 3 chronic kidney disease (CMS/HCC)   • Adenocarcinoma, lung (CMS/HCC)   • Pancytopenia due to antineoplastic chemotherapy (CMS/HCC)         Presenting Problem/History of Present Illness:  Pneumonia due to infectious organism, unspecified laterality, unspecified part of lung [J18.9]     Chief Complaint on Day of Discharge:   No complaint    History of Present Illness on Day of Discharge:   Patient feels perfectly back to baseline today.  He has no shortness of breath, no cough, no fever, no elevated white count, no sputum production and no chest discomfort.  CT scan of the chest shows no evidence of abnormality, specifically no infiltrate is seen.  In fact a notation in the report notes that the chest x-ray findings seen earlier were related to artifact from the technique and that there was no opacity corresponding with the chest x-ray finding.  Ventilation/perfusion scan notes multiple perfusion defects with associated radiographic opacities.  Given the patient's history of cancer with metastasis this likely represents extension of tumor as noted on the CT scan of the chest rather than pulmonary embolus.  There is mild chest discomfort on the right at the costophrenic angle noted with compression.  The patient notes that he strained when he lifted a heavy lawnmower and the pain in that region began immediately.  There is no clinical evidence of pulmonary embolus.    Hospital Course    79-year-old  male who presents to the emergency department with complaints of palpitations.  He  states his heart started running away.  He has had a dry cough.  He states he suffers from chronic constipation, but has no abdominal pain.  He denies any type of fever.  He does have history of lung cancer.  He did mow his yard last Monday.  Patient presented to the ED with SVT, and converted with Valsalva maneuver.  CT was abnormal which showed changes suggestive of pneumonia.  Lactic acid was elevated.  The patient has a new left bundle branch block, Dr. Chahal was consulted from the ED.  The patient's troponin was negative x2.  He has chronic pancytopenia.  VQ scan was indeterminate and the patient got a dose of Lovenox.  The patient states that he might have had history of bleeding ulcers in the past.  Patient was given vancomycin and Azactam in the ED.    Plan:   Azactam and vancomycin  Mucinex  Telemetry  Repeat EKG in the morning  Colace  Labs in the morning  Resume home medications  Respiratory culture    Patient feels perfectly back to baseline today.  He has no shortness of breath, no cough, no fever, no elevated white count, no sputum production and no chest discomfort.  CT scan of the chest shows no evidence of abnormality, specifically no infiltrate is seen.  In fact a notation in the report notes that the chest x-ray findings seen earlier were related to artifact from the technique and that there was no opacity corresponding with the chest x-ray finding.  Ventilation/perfusion scan notes multiple perfusion defects with associated radiographic opacities.  Given the patient's history of cancer with metastasis this likely represents extension of tumor as noted on the CT scan of the chest rather than pulmonary embolus.  There is mild chest discomfort on the right at the costophrenic angle noted with compression.  The patient notes that he strained when he lifted a heavy lawnmower and the pain in that region began immediately.  There is no clinical evidence of pulmonary embolus.      Pertinent Test  Results:   Imaging Results (last 7 days)     Procedure Component Value Units Date/Time    NM Lung Ventilation Perfusion [[id number]] Collected:  08/10/19 2209     Updated:  08/10/19 2214    Narrative:       NM LUNG VENTILATION PERFUSION- 8/10/2019 9:30 PM CDT     HISTORY: Cancer, shortness of breath       COMPARISON: Chest x-ray dated 08/10/2019.      RADIOPHARMACEUTICAL: 15 mCi of Xenon-133 for the ventilation study and  4.8 mCi Tc 99m MAA for the perfusion study.      TECHNIQUE: Following inhalation of the aerosolized radiopharmaceutical,  the ventilation study was performed with images of the thorax being  obtained in posterior projection. Thereafter, the perfusion study was  performed following the injection of radiolabeled MAA particles with  images of the thorax obtained in 6 projections.      FINDINGS:    There are multiple perfusion defects with associated radiographic  opacities.         Impression:       Findings are most commonly associated with intermediate probability for  acute pulmonary embolism.     This report was finalized on 08/10/2019 22:11 by Dr. Katie Peters MD.    CT Chest Without Contrast [[id number]] Collected:  08/10/19 2039     Updated:  08/10/19 2052    Narrative:       CT CHEST WO CONTRAST- 8/10/2019 7:50 PM CDT     HISTORY: left sided opacity, hx of ca, sob rule out infectious vs  malignancy.     COMPARISON: CT chest dated 06/27/2019     DOSE LENGTH PRODUCT: 194 mGy cm. Automated exposure control was also  utilized to decrease patient radiation dose.     TECHNIQUE: Serial helical tomographic images of the chest were acquired.  Multiplanar reformatted images were provided for review.     FINDINGS:  The imaged portion of the neck and thyroid gland is unremarkable.      Previously seen right upper lobe mass shows interval increase soft  tissue component, measuring 3.2 x 4.5 x 2.2 cm, previously 3.3 x 4.4 x 2  cm, with no cystic component on today's examination and there are  lobular  septal thickening is more conspicuous on today's examination.  Innumerable subcentimeter pleural-based nodules are redemonstrated  posteriorly.     Chest x-ray findings seen earlier on today's examination probably  reflected artifact from technique. Specifically, there is no confluent  opacity corresponding with the finding on chest x-ray. There is no  pleural effusion. No pneumothorax. The airway remains patent.  Atherosclerotic disease with coronary artery disease.. There is no  pericardial effusion. No axillary lymphadenopathy by size criteria.  Mediastinal lymph nodes are overall similar.. Borderline cardiomegaly  redemonstrated     No acute findings are seen in the bones or surrounding soft tissues.     Similar bilateral perinephric fat stranding. Pneumobilia is  redemonstrated..       Impression:       1.  Increased soft tissue component in the known right upper lobe mass.  2.  Increase/more conspicuous interlobular septal thickening, with no  new discrete left-sided mass or nodule identified on today's  examination.  3.  Similar mediastinal lymphadenopathy.        This report was finalized on 08/10/2019 20:48 by Dr. Katie Peters MD.    XR Chest 1 View [[id number]] Collected:  08/10/19 1931     Updated:  08/10/19 1936    Narrative:       Exam:   XR CHEST 1 VW-       Date:  08/10/2019      History:  Male, age  79 years;sob     COMPARISON:  Chest x-ray dated 07/29/2019     Findings :  Right-sided chest port port in place.  The heart and mediastinum are normal in size. Right upper lobe opacity,  reflecting known mass. Ill-defined left midlung zone opacity. No  measurable pneumothorax.  The bones show no acute pathology.         Impression:       Impression:     Ill-defined left midlung zone opacity, a new finding. Correlation to  exclude infection.     This report was finalized on 08/10/2019 19:33 by Dr. Katie Peters MD.        Lab Results (last 7 days)     Procedure Component Value Units Date/Time    Blood  Culture With LYNNE - Blood, Arm, Right [[id number]] Collected:  08/10/19 2330    Specimen:  Blood from Arm, Right Updated:  08/11/19 1145     Blood Culture No growth at less than 24 hours    Blood Culture With LYNNE - Blood, Arm, Left [[id number]] Collected:  08/10/19 2330    Specimen:  Blood from Arm, Left Updated:  08/11/19 1145     Blood Culture No growth at less than 24 hours    CBC Auto Differential [[id number]]  (Abnormal) Collected:  08/11/19 0643    Specimen:  Blood Updated:  08/11/19 0753     WBC 2.16 10*3/mm3      RBC 2.38 10*6/mm3      Hemoglobin 7.9 g/dL      Hematocrit 24.0 %      .8 fL      MCH 33.2 pg      MCHC 32.9 g/dL      RDW 23.2 %      RDW-SD 82.9 fl      MPV 11.3 fL      Platelets 78 10*3/mm3     Manual Differential [[id number]]  (Abnormal) Collected:  08/11/19 0643    Specimen:  Blood Updated:  08/11/19 0753     Neutrophil % 74.5 %      Lymphocyte % 14.3 %      Monocyte % 5.1 %      Eosinophil % 3.1 %      Bands %  1.0 %      Atypical Lymphocyte % 2.0 %      Neutrophils Absolute 1.63 10*3/mm3      Lymphocytes Absolute 0.31 10*3/mm3      Monocytes Absolute 0.11 10*3/mm3      Eosinophils Absolute 0.07 10*3/mm3      Anisocytosis Mod/2+     Hypochromia Slight/1+     Polychromasia Slight/1+     WBC Morphology Normal     Platelet Estimate Decreased     Clumped Platelets Present    Comprehensive Metabolic Panel [[id number]]  (Abnormal) Collected:  08/11/19 0558    Specimen:  Blood Updated:  08/11/19 0657     Glucose 114 mg/dL      BUN 38 mg/dL      Creatinine 1.83 mg/dL      Sodium 135 mmol/L      Potassium 4.2 mmol/L      Chloride 107 mmol/L      CO2 23.0 mmol/L      Calcium 9.2 mg/dL      Total Protein 6.0 g/dL      Albumin 2.70 g/dL      ALT (SGPT) 34 U/L      AST (SGOT) 44 U/L      Alkaline Phosphatase 81 U/L      Total Bilirubin 0.6 mg/dL      eGFR Non African Amer 36 mL/min/1.73      Globulin 3.3 gm/dL      A/G Ratio 0.8 g/dL      BUN/Creatinine Ratio 20.8     Anion Gap 5.0 mmol/L     Narrative:        GFR Normal >60  Chronic Kidney Disease <60  Kidney Failure <15    Respiratory Culture - Sputum, Cough [[id number]] Collected:  08/11/19 0522    Specimen:  Sputum from Cough Updated:  08/11/19 0525    Lactic Acid, Reflex [[id number]]  (Normal) Collected:  08/11/19 0019    Specimen:  Blood Updated:  08/11/19 0043     Lactate 1.1 mmol/L     Lactic Acid, Plasma [[id number]]  (Normal) Collected:  08/11/19 0000    Specimen:  Blood Updated:  08/11/19 0023     Lactate 1.3 mmol/L     Lactic Acid, Reflex Timer (This will reflex a repeat order 3-3:15 hours after ordered.) [[id number]] Collected:  08/10/19 2022    Specimen:  Blood Updated:  08/11/19 0001     Extra Tube Hold for add-ons.     Comment: Auto resulted.       Troponin [[id number]]  (Normal) Collected:  08/10/19 2220    Specimen:  Blood Updated:  08/10/19 2254     Troponin I 0.027 ng/mL     Lactic Acid, Plasma [[id number]]  (Abnormal) Collected:  08/10/19 2022    Specimen:  Blood Updated:  08/10/19 2047     Lactate 2.7 mmol/L     Procalcitonin [[id number]]  (Abnormal) Collected:  08/10/19 1905    Specimen:  Blood Updated:  08/10/19 2037     Procalcitonin 0.70 ng/mL     Narrative:       SIRS, sepsis, severe sepsis, and septic shock are categorized according to the criteria of the consensus conference of the American College of Chest Physicians/Society of Critical Care Medicine.    PCT < 0.5 ng/mL     Systemic infection (sepsis) is not likely.    PCT >0.5 and < 2.0 ng/mL Systemic infection (sepsis) is possible, but other conditions are known to elevate PCT as well.    PCT > 2.0 ng/mL     Systemic infection (sepsis) is likely, unless other causes are known.      PCT > 10.0 ng/mL    Important systemic inflammatory response, almost exclusively due to severe bacterial sepsis or septic shock.    PCT values of < 0.5 ng/mL do not exclude an infection, because localized infections (without systemic signs) may be associated with such low concentrations, or a systemic infection in  its initial stages (<6 hours).  Increased PCT can occur without infection.  PCT concentrations between 0.5 and 2.0 ng/mL should be interpreted taking into account the patients history.  It is recommended to retest PCT within 6-24 hours if any concentrations < 2.0 ng/mL are obtained.    Urinalysis With Microscopic If Indicated (No Culture) - Urine, Clean Catch [[id number]]  (Abnormal) Collected:  08/10/19 2014    Specimen:  Urine, Clean Catch Updated:  08/10/19 2025     Color, UA Yellow     Appearance, UA Clear     pH, UA 7.0     Specific Gravity, UA 1.016     Glucose, UA Negative     Ketones, UA Negative     Bilirubin, UA Negative     Blood, UA Negative     Protein, UA 30 mg/dL (1+)     Leuk Esterase, UA Trace     Nitrite, UA Negative     Urobilinogen, UA 1.0 E.U./dL    Urinalysis, Microscopic Only - Urine, Clean Catch [[id number]]  (Abnormal) Collected:  08/10/19 2014    Specimen:  Urine, Clean Catch Updated:  08/10/19 2025     RBC, UA 0-2 /HPF      WBC, UA 0-2 /HPF      Bacteria, UA None Seen /HPF      Squamous Epithelial Cells, UA 0-2 /HPF      Hyaline Casts, UA 0-2 /LPF      Methodology Automated Microscopy    Blood Gas, Arterial [[id number]]  (Abnormal) Collected:  08/10/19 2016    Specimen:  Arterial Blood Updated:  08/10/19 2017     Site Right Radial     Gregg's Test Positive     pH, Arterial 7.440 pH units      pCO2, Arterial 32.0 mm Hg      Comment: 84 Value below reference range        pO2, Arterial 74.3 mm Hg      Comment: 84 Value below reference range        HCO3, Arterial 21.7 mmol/L      Base Excess, Arterial -1.9 mmol/L      Comment: 84 Value below reference range        O2 Saturation, Arterial 96.0 %      Temperature 37.0 C      Barometric Pressure for Blood Gas 749 mmHg      Modality Nasal Cannula     Flow Rate 2.0 lpm      Ventilator Mode NA     Collected by [id number]     Comment: Meter: [id number]     :  [id number]       [place] Draw [[id number]] Collected:  08/10/19 1905    Specimen:  Blood  Updated:  08/10/19 2016    Narrative:       The following orders were created for panel order [facility] Draw.  Procedure                               Abnormality         Status                     ---------                               -----------         ------                     Light Blue Top[[id number]]                                   Final result               Green Top (Gel)[[id number]]                                  Final result               Lavender Top[[id number]]                                     Final result               Red Top[[id number]]                                          Final result                 Please view results for these tests on the individual orders.    Green Top (Gel) [[id number]] Collected:  08/10/19 1905    Specimen:  Blood Updated:  08/10/19 2016     Extra Tube Hold for add-ons.     Comment: Auto resulted.       Lavender Top [[id number]] Collected:  08/10/19 1905    Specimen:  Blood Updated:  08/10/19 2016     Extra Tube hold for add-on     Comment: Auto resulted       Red Top [[id number]] Collected:  08/10/19 1905    Specimen:  Blood Updated:  08/10/19 2016     Extra Tube Hold for add-ons.     Comment: Auto resulted.       Light Blue Top [[id number]] Collected:  08/10/19 1905    Specimen:  Blood Updated:  08/10/19 2016     Extra Tube hold for add-on     Comment: Auto resulted       TSH [[id number]]  (Normal) Collected:  08/10/19 1905    Specimen:  Blood Updated:  08/10/19 2002     TSH 2.600 mIU/mL     T4, Free [[id number]]  (Normal) Collected:  08/10/19 1905    Specimen:  Blood Updated:  08/10/19 1947     Free T4 1.67 ng/dL     BNP [[id number]]  (Normal) Collected:  08/10/19 1905    Specimen:  Blood Updated:  08/10/19 1943     proBNP 662.0 pg/mL     Troponin [[id number]]  (Normal) Collected:  08/10/19 1905    Specimen:  Blood Updated:  08/10/19 1943     Troponin I <0.012 ng/mL     Basic Metabolic Panel [[id number]]  (Abnormal) Collected:  08/10/19 1905    Specimen:  Blood Updated:   08/10/19 1931     Glucose 203 mg/dL      BUN 45 mg/dL      Creatinine 2.19 mg/dL      Sodium 136 mmol/L      Potassium 4.8 mmol/L      Chloride 102 mmol/L      CO2 23.0 mmol/L      Calcium 10.4 mg/dL      eGFR Non African Amer 29 mL/min/1.73      BUN/Creatinine Ratio 20.5     Anion Gap 11.0 mmol/L     Narrative:       GFR Normal >60  Chronic Kidney Disease <60  Kidney Failure <15    CK [[id number]]  (Normal) Collected:  08/10/19 1905    Specimen:  Blood Updated:  08/10/19 1931     Creatine Kinase 27 U/L     Magnesium [[id number]]  (Normal) Collected:  08/10/19 1905    Specimen:  Blood Updated:  08/10/19 1931     Magnesium 2.2 mg/dL     Phosphorus [[id number]]  (Normal) Collected:  08/10/19 1905    Specimen:  Blood Updated:  08/10/19 1931     Phosphorus 4.0 mg/dL     Protime-INR [[id number]]  (Normal) Collected:  08/10/19 1905    Specimen:  Blood Updated:  08/10/19 1926     Protime 13.8 Seconds      INR 1.03    aPTT [[id number]]  (Abnormal) Collected:  08/10/19 1905    Specimen:  Blood Updated:  08/10/19 1926     PTT 38.6 seconds     CBC & Differential [[id number]] Collected:  08/10/19 1905    Specimen:  Blood Updated:  08/10/19 1919    Narrative:       The following orders were created for panel order CBC & Differential.  Procedure                               Abnormality         Status                     ---------                               -----------         ------                     CBC Auto Differential[[id number]]        Abnormal            Final result                 Please view results for these tests on the individual orders.    CBC Auto Differential [[id number]]  (Abnormal) Collected:  08/10/19 1905    Specimen:  Blood Updated:  08/10/19 1919     WBC 3.18 10*3/mm3      RBC 3.04 10*6/mm3      Hemoglobin 10.2 g/dL      Hematocrit 30.6 %      .7 fL      MCH 33.6 pg      MCHC 33.3 g/dL      RDW 23.5 %      RDW-SD 86.3 fl      MPV 10.7 fL      Platelets 110 10*3/mm3      Neutrophil % 52.9 %       "Lymphocyte % 37.1 %      Monocyte % 5.3 %      Eosinophil % 2.2 %      Basophil % 0.3 %      Immature Grans % 2.2 %      Neutrophils, Absolute 1.68 10*3/mm3      Lymphocytes, Absolute 1.18 10*3/mm3      Monocytes, Absolute 0.17 10*3/mm3      Eosinophils, Absolute 0.07 10*3/mm3      Basophils, Absolute 0.01 10*3/mm3      Immature Grans, Absolute 0.07 10*3/mm3      nRBC 0.0 /100 WBC             Condition on Discharge:    Stable    Physical Exam on Discharge:  /63 (BP Location: Right arm, Patient Position: Lying)   Pulse 85   Temp 98.6 °F (37 °C) (Oral)   Resp 16   Ht 162.6 cm (64\")   Wt 72.5 kg (159 lb 12.8 oz)   SpO2 95%   BMI 27.43 kg/m²   Physical Exam      Discharge Disposition:  Home or Self Care    Discharge Medications:     Discharge Medications      Changes to Medications      Instructions Start Date   metoprolol tartrate 25 MG tablet  Commonly known as:  LOPRESSOR  What changed:  how much to take   25 mg, Oral, Every 12 Hours Scheduled         Continue These Medications      Instructions Start Date   flecainide 50 MG tablet  Commonly known as:  TAMBOCOR   50 mg, Oral, Every 12 Hours Scheduled      folic acid 1 MG tablet  Commonly known as:  FOLVITE   1 mg, Oral, Daily      megestrol 40 MG/ML suspension  Commonly known as:  MEGACE   800 mg, Oral, Daily      melatonin 5 MG tablet tablet   5 mg, Oral, Nightly PRN      MULTIVITAMIN ADULT PO   1 tablet, Oral, Daily      pantoprazole 40 MG EC tablet  Commonly known as:  PROTONIX   40 mg, Oral, Daily      sodium bicarbonate 650 MG tablet   650 mg, Oral, 2 Times Daily      sucralfate 1 GM/10ML suspension  Commonly known as:  CARAFATE   2 g, Oral, 2 Times Daily      tamsulosin 0.4 MG capsule 24 hr capsule  Commonly known as:  FLOMAX   1 capsule, Oral, Nightly      zolpidem 5 MG tablet  Commonly known as:  AMBIEN   5 mg, Oral, Nightly PRN             Discharge Diet:   Diet Instructions     Diet: Regular; Thin      Discharge Diet:  Regular    Fluid " Consistency:  Thin          Discharge Care Plan / Instructions:   Discharge home    Activity at Discharge:   Activity Instructions     Activity as Tolerated            Follow-up Appointments:  Follow-up with primary care provider next week       Hang Reddy DO  08/11/19  2:59 PM    Time: Discharge Less than 30 min    Please note that portions of this note may have been completed with a voice recognition program. Efforts were made to edit the dictations, but occasionally words are mistranscribed.

## 2019-08-11 NOTE — PLAN OF CARE
Problem: Patient Care Overview  Goal: Plan of Care Review  Outcome: Ongoing (interventions implemented as appropriate)   08/11/19 0150   Coping/Psychosocial   Plan of Care Reviewed With patient   Plan of Care Review   Progress no change   OTHER   Outcome Summary pt admit from ER with PN. and poss PE. vss ekg to be performed in the AM. cont to monitor       Problem: Pneumonia (Adult)  Goal: Signs and Symptoms of Listed Potential Problems Will be Absent, Minimized or Managed (Pneumonia)  Outcome: Ongoing (interventions implemented as appropriate)

## 2019-08-11 NOTE — ED PROVIDER NOTES
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 He further noted today that he was in the yard mowing and bent over noting pain and worsening SOB with associated racing heart thus he arrives here for evaluation. On arrival he has a HR >180. Patient is alert and oriented but poor historian often telling me he cannot remember his history. He arrives in NAD.       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  No date: Cancer (CMS/HCC)      Comment:  stomach & lung  No date: CHF (congestive heart failure) (CMS/[facility])  No date: CKD (chronic kidney disease) stage 3, GFR 30-59 ml/min (CMS/  [facility])  No date: GERD (gastroesophageal reflux disease)  No date: Hearing loss  No date: History of transfusion  No date: Hydronephrosis of left kidney  No date: Hyperlipidemia  No date: Hypertension      Comment:  pt was taken off of all of his medications for BP                (atenolol, lisinopril, lasix) because his BP kept                bottoming out so his primary dr told him to discontinue                them 1-2 months ago (Jan/Feb 2019). pt states he takes no               medications currently.  02/2019: Mass of upper lobe of right lung      Comment:  mass is shrinking on its own, so pt states Dr. Patel is                just going to keep an eye on it and not do surgery right                now.  No date: Pancreatic mass      Comment:  pt states he had this in 2013 but it went away on its                own. Now recent CT shows it has come back so he is going                to have an ultrasound on 3/13/19.  No  date: Shortness of breath    Past Surgical History:  No date: ABDOMINAL SURGERY  10/24/2018: BRONCHOSCOPY; N/A      Comment:  Procedure: BRONCHOSCOPY WITH BIOPSY, EBUS;  Surgeon:                Gareth Becerra MD;  Location: [facility] OR;  Service:                Pulmonary  No date: CHOLECYSTECTOMY  1/3/2019: COLONOSCOPY; N/A      Comment:  Procedure: COLONOSCOPY WITH ANESTHESIA;  Surgeon:                Randy Somers DO;  Location: [facility] ENDOSCOPY;                 Service: Gastroenterology  3/8/2019: CYSTOSCOPY, RETROGRADE PYELOGRAM AND STENT INSERTION; Left      Comment:  Procedure: CYSTOSCOPY RETROGRADE BILATERAL PYELOGRAM;                 Surgeon: Jos Sylvester MD;  Location: [facility] OR;                 Service: Urology  12/11/2018: ENDOSCOPY; N/A      Comment:  Procedure: ESOPHAGOGASTRODUODENOSCOPY WITH ANESTHESIA;                 Surgeon: Randy Somers DO;  Location: [facility]                ENDOSCOPY;  Service: Gastroenterology  4/29/2019: ENDOSCOPY; N/A      Comment:  Procedure: ESOPHAGOGASTRODUODENOSCOPY WITH ANESTHESIA;                 Surgeon: Lilliam Jj MD;  Location: [facility] OR;                 Service: Gastroenterology  5/9/2019: ENDOSCOPY; N/A      Comment:  Procedure: ESOPHAGOGASTRODUODENOSCOPY WITH ANESTHESIA;                 Surgeon: Pilo Bansal MD;  Location: [facility]                ENDOSCOPY;  Service: Gastroenterology  1964: EYE SURGERY; Left  1966: FOOT SURGERY; Right      Comment:  joint  No date: FRACTURE SURGERY    Social History    Socioeconomic History      Marital status: Single      Spouse name: Not on file      Number of children: Not on file      Years of education: Not on file      Highest education level: Not on file    Tobacco Use      Smoking status: Former Smoker        Years: 49.00        Types: Cigarettes        Quit date: 10/29/2003        Years since quitting: 15.7      Smokeless tobacco: Former User        Types: Chew        Quit date: 1970    Substance  and Sexual Activity      Alcohol use: No      Drug use: No      Sexual activity: Defer      Family history: reviewed and noncontributory                       Review of Systems   All other systems reviewed and are negative.      Past Medical History:   Diagnosis Date   • Arthritis    • Blindness of left eye    • BPH with obstruction/lower urinary tract symptoms    • Cancer (CMS/HCC)     stomach & lung   • CHF (congestive heart failure) (CMS/HCC)    • CKD (chronic kidney disease) stage 3, GFR 30-59 ml/min (CMS/HCC)    • GERD (gastroesophageal reflux disease)    • Hearing loss    • History of transfusion    • Hydronephrosis of left kidney    • Hyperlipidemia    • Hypertension     pt was taken off of all of his medications for BP (atenolol, lisinopril, lasix) because his BP kept bottoming out so his primary dr told him to discontinue them 1-2 months ago (Jan/Feb 2019). pt states he takes no medications currently.   • Mass of upper lobe of right lung 02/2019    mass is shrinking on its own, so pt states Dr. Patel is just going to keep an eye on it and not do surgery right now.   • Pancreatic mass     pt states he had this in 2013 but it went away on its own. Now recent CT shows it has come back so he is going to have an ultrasound on 3/13/19.   • Shortness of breath        Allergies   Allergen Reactions   • Penicillins Hives       Past Surgical History:   Procedure Laterality Date   • ABDOMINAL SURGERY     • BRONCHOSCOPY N/A 10/24/2018    Procedure: BRONCHOSCOPY WITH BIOPSY, EBUS;  Surgeon: Gareth Becerra MD;  Location: [facility] OR;  Service: Pulmonary   • CHOLECYSTECTOMY     • COLONOSCOPY N/A 1/3/2019    Procedure: COLONOSCOPY WITH ANESTHESIA;  Surgeon: Randy Somers DO;  Location: [facility] ENDOSCOPY;  Service: Gastroenterology   • CYSTOSCOPY, RETROGRADE PYELOGRAM AND STENT INSERTION Left 3/8/2019    Procedure: CYSTOSCOPY RETROGRADE BILATERAL PYELOGRAM;  Surgeon: Jos Sylvester MD;  Location: [facility] OR;   Service: Urology   • ENDOSCOPY N/A 12/11/2018    Procedure: ESOPHAGOGASTRODUODENOSCOPY WITH ANESTHESIA;  Surgeon: Randy Somers DO;  Location: [facility] ENDOSCOPY;  Service: Gastroenterology   • ENDOSCOPY N/A 4/29/2019    Procedure: ESOPHAGOGASTRODUODENOSCOPY WITH ANESTHESIA;  Surgeon: Lilliam Jj MD;  Location: [facility] OR;  Service: Gastroenterology   • ENDOSCOPY N/A 5/9/2019    Procedure: ESOPHAGOGASTRODUODENOSCOPY WITH ANESTHESIA;  Surgeon: Pilo Bansal MD;  Location: [facility] ENDOSCOPY;  Service: Gastroenterology   • EYE SURGERY Left 1964   • FOOT SURGERY Right 1966    joint   • FRACTURE SURGERY         Family History   Problem Relation Age of Onset   • Hypertension Mother    • Leukemia Father        Social History     Socioeconomic History   • Marital status: Single     Spouse name: Not on file   • Number of children: Not on file   • Years of education: Not on file   • Highest education level: Not on file   Tobacco Use   • Smoking status: Former Smoker     Years: 49.00     Types: Cigarettes     Last attempt to quit: 10/29/2003     Years since quitting: 15.7   • Smokeless tobacco: Former User     Types: Chew     Quit date: 1970   Substance and Sexual Activity   • Alcohol use: No   • Drug use: No   • Sexual activity: Defer           Objective   Physical Exam   Constitutional: He appears well-developed and well-nourished.   HENT:   Head: Normocephalic and atraumatic.   Mouth/Throat: Oropharynx is clear and moist.   Eyes:   Left eye is opaque    Neck: Normal range of motion. Neck supple.   Cardiovascular: Normal rate and regular rhythm.   Pulmonary/Chest: He has decreased breath sounds.   Abdominal: Soft. Bowel sounds are normal.   Musculoskeletal: Normal range of motion.        Right lower leg: Normal. He exhibits no edema.        Left lower leg: Normal. He exhibits no edema.   Neurological: He is alert.   Skin: Skin is warm. Capillary refill takes less than 2 seconds.   Psychiatric: He has a normal mood  "and affect. His behavior is normal.   Vitals reviewed.      ECG 12 Lead    Date/Time: 8/10/2019 6:56 PM  Performed by: Joaquim Bro MD  Authorized by: Joaquim Bro MD   Interpreted by physician  Rhythm: SVT    ECG 12 Lead    Date/Time: 8/10/2019 7:03 PM  Performed by: Joaquim Bro MD  Authorized by: Antonino Walker DO   Interpreted by physician  Comparison: compared with previous ECG from 5/19/2018  Comparison to previous ECG: There is a change in the EKG with noted new RBBB as well as st depressions likely related to RBBB  Rhythm: sinus tachycardia  Rate: tachycardic  BPM: 108  QRS axis: normal      ECG 12 Lead    Date/Time: 8/10/2019 10:06 PM  Performed by: Joaquim Bro MD  Authorized by: Joaquim Bro MD   Interpreted by physician  Rhythm: sinus rhythm  Rate: normal  QRS axis: left  Conduction: left bundle branch block                 ED Course  ED Course as of Aug 10 2305   Sat Aug 10, 2019   2224 Patient tells me his next chemo is 8/23 and last was last week. He denies any history of DVT or PE but tells me he has had a history of \"bleeding ulcers in the past\" He has never been told he could not receive anticoagulation.     [JH]   2301 Dr. Rojas aware of the LBBB states okay for admission to medicine with patient without any Cp, Vs now stable and without troponin elevation  [JH]      ED Course User Index  [JH] Joaquim Bro MD      I was able to convert him to sinus tach with a modified valsalva.       No fever but with tachycardia and leukopenia with the new finding on CXR ? Infectious vs new malg. Recent admission thus will provide HCAP abx and some fluds to help with rate.   NM Lung Ventilation Perfusion   Final Result   Findings are most commonly associated with intermediate probability for   acute pulmonary embolism.       This report was finalized on 08/10/2019 22:11 by Dr. Katie Peters MD.      CT Chest Without Contrast   Final Result   1.  Increased " soft tissue component in the known right upper lobe mass.   2.  Increase/more conspicuous interlobular septal thickening, with no   new discrete left-sided mass or nodule identified on today's   examination.   3.  Similar mediastinal lymphadenopathy.           This report was finalized on 08/10/2019 20:48 by Dr. Katie Peters MD.      XR Chest 1 View   Final Result   Impression:       Ill-defined left midlung zone opacity, a new finding. Correlation to   exclude infection.       This report was finalized on 08/10/2019 19:33 by Dr. Katie Peters MD.        Labs Reviewed   BASIC METABOLIC PANEL - Abnormal; Notable for the following components:       Result Value    Glucose 203 (*)     BUN 45 (*)     Creatinine 2.19 (*)     CO2 23.0 (*)     eGFR Non  Amer 29 (*)     All other components within normal limits    Narrative:     GFR Normal >60  Chronic Kidney Disease <60  Kidney Failure <15   APTT - Abnormal; Notable for the following components:    PTT 38.6 (*)     All other components within normal limits   URINALYSIS W/ MICROSCOPIC IF INDICATED (NO CULTURE) - Abnormal; Notable for the following components:    Protein, UA 30 mg/dL (1+) (*)     Leuk Esterase, UA Trace (*)     All other components within normal limits   CBC WITH AUTO DIFFERENTIAL - Abnormal; Notable for the following components:    WBC 3.18 (*)     RBC 3.04 (*)     Hemoglobin 10.2 (*)     Hematocrit 30.6 (*)     .7 (*)     MCH 33.6 (*)     RDW 23.5 (*)     RDW-SD 86.3 (*)     Platelets 110 (*)     Immature Grans % 2.2 (*)     Neutrophils, Absolute 1.68 (*)     Immature Grans, Absolute 0.07 (*)     All other components within normal limits   PROCALCITONIN - Abnormal; Notable for the following components:    Procalcitonin 0.70 (*)     All other components within normal limits    Narrative:     SIRS, sepsis, severe sepsis, and septic shock are categorized according to the criteria of the consensus conference of the American College of Chest  Physicians/Society of Critical Care Medicine.    PCT < 0.5 ng/mL     Systemic infection (sepsis) is not likely.    PCT >0.5 and < 2.0 ng/mL Systemic infection (sepsis) is possible, but other conditions are known to elevate PCT as well.    PCT > 2.0 ng/mL     Systemic infection (sepsis) is likely, unless other causes are known.      PCT > 10.0 ng/mL    Important systemic inflammatory response, almost exclusively due to severe bacterial sepsis or septic shock.    PCT values of < 0.5 ng/mL do not exclude an infection, because localized infections (without systemic signs) may be associated with such low concentrations, or a systemic infection in its initial stages (<6 hours).  Increased PCT can occur without infection.  PCT concentrations between 0.5 and 2.0 ng/mL should be interpreted taking into account the patients history.  It is recommended to retest PCT within 6-24 hours if any concentrations < 2.0 ng/mL are obtained.   LACTIC ACID, PLASMA - Abnormal; Notable for the following components:    Lactate 2.7 (*)     All other components within normal limits   BLOOD GAS, ARTERIAL - Abnormal; Notable for the following components:    pCO2, Arterial 32.0 (*)     pO2, Arterial 74.3 (*)     Base Excess, Arterial -1.9 (*)     All other components within normal limits   URINALYSIS, MICROSCOPIC ONLY - Abnormal; Notable for the following components:    RBC, UA 0-2 (*)     WBC, UA 0-2 (*)     All other components within normal limits   PROTIME-INR - Normal   BNP (IN-HOUSE) - Normal   TROPONIN (IN-HOUSE) - Normal   CK - Normal   MAGNESIUM - Normal   PHOSPHORUS - Normal   TSH - Normal   T4, FREE - Normal   TROPONIN (IN-HOUSE) - Normal   BLOOD CULTURE   BLOOD CULTURE   RAINBOW DRAW    Narrative:     The following orders were created for panel order [place] Draw.  Procedure                               Abnormality         Status                     ---------                               -----------         ------                      Light Blue Top[[id number]]                                   Final result               Green Top (Gel)[[id number]]                                  Final result               Lavender Top[[id number]]                                     Final result               Red Top[[id number]]                                          Final result                 Please view results for these tests on the individual orders.   BLOOD GAS, ARTERIAL   LACTIC ACID REFLEX TIMER   LIGHT BLUE TOP   GREEN TOP   LAVENDER TOP   RED TOP   CBC AND DIFFERENTIAL    Narrative:     The following orders were created for panel order CBC & Differential.  Procedure                               Abnormality         Status                     ---------                               -----------         ------                     CBC Auto Differential[[id number]]        Abnormal            Final result                 Please view results for these tests on the individual orders.                 MDM      Final diagnoses:   Pneumonia due to infectious organism, unspecified laterality, unspecified part of lung   Other pulmonary embolism without acute cor pulmonale, unspecified chronicity (CMS/[facility])   Left bundle branch block   SVT (supraventricular tachycardia) (CMS/[facility])   [place] (acute kidney injury) (CMS/[facility])            Joaquim Bro MD  08/10/19 [id number]       Joaquim Bro MD  08/10/19 [id number]

## 2019-08-11 NOTE — PROGRESS NOTES
"Pharmacy Dosing Service  Pharmacokinetics  Vancomycin Initial Evaluation    Assessment/Action/Plan:  Initiated Vancomycin 1250 mg IVPB every 48 hours. Patient received Vancomycin 1500mg once on 8/10.  Vancomycin levels not ordered at this time..  Current vancomycin end date: 8/18. Pharmacy will monitor renal function and adjust dose accordingly.     Subjective:  Karoline Prater is a 79 y.o. male receiving Vancomycin for the indication of PNA .  Day 1 of therapy    Objective:  Ht: 162.6 cm (64\"); Wt: 72.5 kg (159 lb 12.8 oz)  Estimated Creatinine Clearance: 25.8 mL/min (A) (by C-G formula based on SCr of 2.19 mg/dL (H)).   Lab Results   Component Value Date    CREATININE 2.19 (H) 08/10/2019    CREATININE 1.24 06/01/2019    CREATININE 1.47 (H) 05/31/2019    CREATININE 1.9 (H) 03/18/2019    CREATININE 1.60 (H) 02/18/2019      Lab Results   Component Value Date    WBC 3.18 (L) 08/10/2019    WBC 1.46 (C) 07/19/2019    WBC 1.93 (C) 06/01/2019      Baseline culture results:  Microbiology Results (last 10 days)       ** No results found for the last 240 hours. **            Kiana Madison RP  08/11/19 12:56 AM    "

## 2019-08-11 NOTE — H&P
[facility] Medicine Services  HISTORY AND PHYSICAL    Date of Admission: 8/10/2019  Primary Care Physician: Irving Alexis MD    Subjective     Chief Complaint: Palpitations    History of Present Illness  79-year-old  male who presents to the emergency department with complaints of palpitations.  He states his heart started running away.  He has had a dry cough.  He states he suffers from chronic constipation, but has no abdominal pain.  He denies any type of fever.  He does have history of lung cancer.  He did mow his yard last Monday.  Patient presented to the ED with SVT, and converted with Valsalva maneuver.  CT was abnormal which showed changes suggestive of pneumonia.  Lactic acid was elevated.  The patient has a new left bundle branch block, Dr. Chahal was consulted from the ED.  The patient's troponin was negative x2.  He has chronic pancytopenia.  VQ scan was indeterminate and the patient got a dose of Lovenox.  The patient states that he might have had history of bleeding ulcers in the past.  Patient was given vancomycin and Azactam in the ED.        Review of Systems   Dyspnea  Palpitations  Chronic pancytopenia  Currently undergoing treatment for lung cancer    Otherwise complete ROS reviewed and negative except as mentioned in the HPI.    Past Medical History:   Past Medical History:   Diagnosis Date   • Arthritis    • Blindness of left eye    • BPH with obstruction/lower urinary tract symptoms    • Cancer (CMS/HCC)     stomach & lung   • CHF (congestive heart failure) (CMS/HCC)    • CKD (chronic kidney disease) stage 3, GFR 30-59 ml/min (CMS/HCC)    • Coronary artery disease    • Elevated cholesterol    • GERD (gastroesophageal reflux disease)    • Hearing loss    • History of transfusion    • Hydronephrosis of left kidney    • Hyperlipidemia    • Hypertension     pt was taken off of all of his medications for BP (atenolol, lisinopril, lasix)  because his BP kept bottoming out so his primary dr told him to discontinue them 1-2 months ago (Jan/Feb 2019). pt states he takes no medications currently.   • Mass of upper lobe of right lung 02/2019    mass is shrinking on its own, so pt states Dr. Patel is just going to keep an eye on it and not do surgery right now.   • Pancreatic mass     pt states he had this in 2013 but it went away on its own. Now recent CT shows it has come back so he is going to have an ultrasound on 3/13/19.   • Shortness of breath      Past Surgical History:  Past Surgical History:   Procedure Laterality Date   • ABDOMINAL SURGERY     • BRONCHOSCOPY N/A 10/24/2018    Procedure: BRONCHOSCOPY WITH BIOPSY, EBUS;  Surgeon: Gareth Becerra MD;  Location: [facility] OR;  Service: Pulmonary   • CHOLECYSTECTOMY     • COLONOSCOPY N/A 1/3/2019    Procedure: COLONOSCOPY WITH ANESTHESIA;  Surgeon: Randy Somers DO;  Location: [facility] ENDOSCOPY;  Service: Gastroenterology   • CYSTOSCOPY, RETROGRADE PYELOGRAM AND STENT INSERTION Left 3/8/2019    Procedure: CYSTOSCOPY RETROGRADE BILATERAL PYELOGRAM;  Surgeon: Jos Sylvester MD;  Location: [facility] OR;  Service: Urology   • ENDOSCOPY N/A 12/11/2018    Procedure: ESOPHAGOGASTRODUODENOSCOPY WITH ANESTHESIA;  Surgeon: Randy Somers DO;  Location: [facility] ENDOSCOPY;  Service: Gastroenterology   • ENDOSCOPY N/A 4/29/2019    Procedure: ESOPHAGOGASTRODUODENOSCOPY WITH ANESTHESIA;  Surgeon: Lilliam Jj MD;  Location: [facility] OR;  Service: Gastroenterology   • ENDOSCOPY N/A 5/9/2019    Procedure: ESOPHAGOGASTRODUODENOSCOPY WITH ANESTHESIA;  Surgeon: Pilo Bansal MD;  Location: [facility] ENDOSCOPY;  Service: Gastroenterology   • EYE SURGERY Left 1964   • FOOT SURGERY Right 1966    joint   • FRACTURE SURGERY       Social History:  reports that he quit smoking about 15 years ago. His smoking use included cigarettes. He quit after 49.00 years of use. He quit smokeless tobacco use about 49 years ago.  "His smokeless tobacco use included chew. He reports that he does not drink alcohol or use drugs.    Family History: family history includes Hypertension in his mother; Leukemia in his father.      Allergies:  Allergies   Allergen Reactions   • Penicillins Hives     Medications:  Prior to Admission medications    Medication Sig Start Date End Date Taking? Authorizing Provider   flecainide (TAMBOCOR) 50 MG tablet Take 1 tablet by mouth Every 12 (Twelve) Hours. 6/1/19   Zachary Lloyd MD   folic acid (FOLVITE) 1 MG tablet Take 1 mg by mouth Daily.    Eliecer Schultz MD   megestrol (MEGACE) 40 MG/ML suspension Take 800 mg by mouth Daily.    Eliecer Schultz MD   melatonin 5 MG tablet tablet Take 5 mg by mouth At Night As Needed (sleep).    Eliecer Schultz MD   metoprolol tartrate (LOPRESSOR) 25 MG tablet Take 0.5 tablets by mouth Every 12 (Twelve) Hours. 6/1/19   Zachary Lloyd MD   Multiple Vitamins-Minerals (MULTIVITAMIN ADULT PO) Take 1 tablet by mouth Daily.    Eliecer Schultz MD   pantoprazole (PROTONIX) 40 MG EC tablet Take 1 tablet by mouth Daily. 4/30/19   Hang Reddy DO   sodium bicarbonate 650 MG tablet Take 1 tablet by mouth 2 (Two) Times a Day. 4/30/19   Hang Reddy DO   sucralfate (CARAFATE) 1 GM/10ML suspension Take 20 mL by mouth 2 (Two) Times a Day. 4/30/19   Hang Reddy DO   tamsulosin (FLOMAX) 0.4 MG capsule 24 hr capsule Take 1 capsule by mouth Every Night.    Eliecer Schultz MD   zolpidem (AMBIEN) 5 MG tablet Take 5 mg by mouth At Night As Needed for Sleep.    Eliecer Schultz MD     Objective     Vital Signs: /63 (BP Location: Left arm, Patient Position: Sitting)   Pulse 102   Temp 98.2 °F (36.8 °C) (Oral)   Resp 16   Ht 162.6 cm (64\")   Wt 72.5 kg (159 lb 12.8 oz)   SpO2 93%   BMI 27.43 kg/m²   Physical Exam   HENT:   Head: Normocephalic and atraumatic.   Nose: Nose normal.   Mouth/Throat: Oropharynx is clear and moist. "   Wearing glasses with left eyeball abnormality.    Eyes: Conjunctivae and EOM are normal.   Neck: Normal range of motion. Neck supple.   Cardiovascular: Normal rate, regular rhythm and normal heart sounds.   Pulmonary/Chest: Effort normal and breath sounds normal.   Abdominal: Soft. Bowel sounds are normal.   Mid line hard area. Feels like hernia, patient states scar tissue from previous surgery.    Musculoskeletal: He exhibits no edema or tenderness.   Neurological: He is alert. No cranial nerve deficit.   Skin: Skin is warm and dry.   Psychiatric: He has a normal mood and affect.   Vitals reviewed.          Results Reviewed:  Lab Results (last 24 hours)     Procedure Component Value Units Date/Time    Lactic Acid, Reflex [[id number]] Collected:  08/11/19 0019    Specimen:  Blood Updated:  08/11/19 0026    Lactic Acid, Plasma [[id number]]  (Normal) Collected:  08/11/19 0000    Specimen:  Blood Updated:  08/11/19 0023     Lactate 1.3 mmol/L     Lactic Acid, Reflex Timer (This will reflex a repeat order 3-3:15 hours after ordered.) [[id number]] Collected:  08/10/19 2022    Specimen:  Blood Updated:  08/11/19 0001     Extra Tube Hold for add-ons.     Comment: Auto resulted.       Blood Culture With LYNNE - Blood, Arm, Right [[id number]] Collected:  08/10/19 2330    Specimen:  Blood from Arm, Right Updated:  08/10/19 2337    Blood Culture With LYNNE - Blood, Arm, Left [[id number]] Collected:  08/10/19 2330    Specimen:  Blood from Arm, Left Updated:  08/10/19 2337    Troponin [[id number]]  (Normal) Collected:  08/10/19 2220    Specimen:  Blood Updated:  08/10/19 2254     Troponin I 0.027 ng/mL     Lactic Acid, Plasma [[id number]]  (Abnormal) Collected:  08/10/19 2022    Specimen:  Blood Updated:  08/10/19 2047     Lactate 2.7 mmol/L     Procalcitonin [[id number]]  (Abnormal) Collected:  08/10/19 1905    Specimen:  Blood Updated:  08/10/19 2037     Procalcitonin 0.70 ng/mL     Narrative:       SIRS, sepsis, severe sepsis, and  septic shock are categorized according to the criteria of the consensus conference of the American College of Chest Physicians/Society of Critical Care Medicine.    PCT < 0.5 ng/mL     Systemic infection (sepsis) is not likely.    PCT >0.5 and < 2.0 ng/mL Systemic infection (sepsis) is possible, but other conditions are known to elevate PCT as well.    PCT > 2.0 ng/mL     Systemic infection (sepsis) is likely, unless other causes are known.      PCT > 10.0 ng/mL    Important systemic inflammatory response, almost exclusively due to severe bacterial sepsis or septic shock.    PCT values of < 0.5 ng/mL do not exclude an infection, because localized infections (without systemic signs) may be associated with such low concentrations, or a systemic infection in its initial stages (<6 hours).  Increased PCT can occur without infection.  PCT concentrations between 0.5 and 2.0 ng/mL should be interpreted taking into account the patients history.  It is recommended to retest PCT within 6-24 hours if any concentrations < 2.0 ng/mL are obtained.    Urinalysis With Microscopic If Indicated (No Culture) - Urine, Clean Catch [[id number]]  (Abnormal) Collected:  08/10/19 2014    Specimen:  Urine, Clean Catch Updated:  08/10/19 2025     Color, UA Yellow     Appearance, UA Clear     pH, UA 7.0     Specific Gravity, UA 1.016     Glucose, UA Negative     Ketones, UA Negative     Bilirubin, UA Negative     Blood, UA Negative     Protein, UA 30 mg/dL (1+)     Leuk Esterase, UA Trace     Nitrite, UA Negative     Urobilinogen, UA 1.0 E.U./dL    Urinalysis, Microscopic Only - Urine, Clean Catch [[id number]]  (Abnormal) Collected:  08/10/19 2014    Specimen:  Urine, Clean Catch Updated:  08/10/19 2025     RBC, UA 0-2 /HPF      WBC, UA 0-2 /HPF      Bacteria, UA None Seen /HPF      Squamous Epithelial Cells, UA 0-2 /HPF      Hyaline Casts, UA 0-2 /LPF      Methodology Automated Microscopy    Blood Gas, Arterial [[id number]]  (Abnormal)  Collected:  08/10/19 2016    Specimen:  Arterial Blood Updated:  08/10/19 2017     Site Right Radial     Gregg's Test Positive     pH, Arterial 7.440 pH units      pCO2, Arterial 32.0 mm Hg      Comment: 84 Value below reference range        pO2, Arterial 74.3 mm Hg      Comment: 84 Value below reference range        HCO3, Arterial 21.7 mmol/L      Base Excess, Arterial -1.9 mmol/L      Comment: 84 Value below reference range        O2 Saturation, Arterial 96.0 %      Temperature 37.0 C      Barometric Pressure for Blood Gas 749 mmHg      Modality Nasal Cannula     Flow Rate 2.0 lpm      Ventilator Mode NA     Collected by [id number]     Comment: Meter: [id number]     :  [id number]       [employer] Draw [[id number]] Collected:  08/10/19 1905    Specimen:  Blood Updated:  08/10/19 2016    Narrative:       The following orders were created for panel order [employer] Draw.  Procedure                               Abnormality         Status                     ---------                               -----------         ------                     Light Blue Top[[id number]]                                   Final result               Green Top (Gel)[[id number]]                                  Final result               Lavender Top[[id number]]                                     Final result               Red Top[[id number]]                                          Final result                 Please view results for these tests on the individual orders.    Green Top (Gel) [[id number]] Collected:  08/10/19 1905    Specimen:  Blood Updated:  08/10/19 2016     Extra Tube Hold for add-ons.     Comment: Auto resulted.       Lavender Top [[id number]] Collected:  08/10/19 1905    Specimen:  Blood Updated:  08/10/19 2016     Extra Tube hold for add-on     Comment: Auto resulted       Red Top [[id number]] Collected:  08/10/19 1905    Specimen:  Blood Updated:  08/10/19 2016     Extra Tube Hold for add-ons.     Comment: Auto resulted.        Light Blue Top [[id number]] Collected:  08/10/19 1905    Specimen:  Blood Updated:  08/10/19 2016     Extra Tube hold for add-on     Comment: Auto resulted       TSH [[id number]]  (Normal) Collected:  08/10/19 1905    Specimen:  Blood Updated:  08/10/19 2002     TSH 2.600 mIU/mL     T4, Free [[id number]]  (Normal) Collected:  08/10/19 1905    Specimen:  Blood Updated:  08/10/19 1947     Free T4 1.67 ng/dL     BNP [[id number]]  (Normal) Collected:  08/10/19 1905    Specimen:  Blood Updated:  08/10/19 1943     proBNP 662.0 pg/mL     Troponin [[id number]]  (Normal) Collected:  08/10/19 1905    Specimen:  Blood Updated:  08/10/19 1943     Troponin I <0.012 ng/mL     Basic Metabolic Panel [[id number]]  (Abnormal) Collected:  08/10/19 1905    Specimen:  Blood Updated:  08/10/19 1931     Glucose 203 mg/dL      BUN 45 mg/dL      Creatinine 2.19 mg/dL      Sodium 136 mmol/L      Potassium 4.8 mmol/L      Chloride 102 mmol/L      CO2 23.0 mmol/L      Calcium 10.4 mg/dL      eGFR Non African Amer 29 mL/min/1.73      BUN/Creatinine Ratio 20.5     Anion Gap 11.0 mmol/L     Narrative:       GFR Normal >60  Chronic Kidney Disease <60  Kidney Failure <15    CK [[id number]]  (Normal) Collected:  08/10/19 1905    Specimen:  Blood Updated:  08/10/19 1931     Creatine Kinase 27 U/L     Magnesium [[id number]]  (Normal) Collected:  08/10/19 1905    Specimen:  Blood Updated:  08/10/19 1931     Magnesium 2.2 mg/dL     Phosphorus [[id number]]  (Normal) Collected:  08/10/19 1905    Specimen:  Blood Updated:  08/10/19 1931     Phosphorus 4.0 mg/dL     Protime-INR [[id number]]  (Normal) Collected:  08/10/19 1905    Specimen:  Blood Updated:  08/10/19 1926     Protime 13.8 Seconds      INR 1.03    aPTT [[id number]]  (Abnormal) Collected:  08/10/19 1905    Specimen:  Blood Updated:  08/10/19 1926     PTT 38.6 seconds     CBC & Differential [[id number]] Collected:  08/10/19 1905    Specimen:  Blood Updated:  08/10/19 1919    Narrative:       The  following orders were created for panel order CBC & Differential.  Procedure                               Abnormality         Status                     ---------                               -----------         ------                     CBC Auto Differential[[id number]]        Abnormal            Final result                 Please view results for these tests on the individual orders.    CBC Auto Differential [[id number]]  (Abnormal) Collected:  08/10/19 1905    Specimen:  Blood Updated:  08/10/19 1919     WBC 3.18 10*3/mm3      RBC 3.04 10*6/mm3      Hemoglobin 10.2 g/dL      Hematocrit 30.6 %      .7 fL      MCH 33.6 pg      MCHC 33.3 g/dL      RDW 23.5 %      RDW-SD 86.3 fl      MPV 10.7 fL      Platelets 110 10*3/mm3      Neutrophil % 52.9 %      Lymphocyte % 37.1 %      Monocyte % 5.3 %      Eosinophil % 2.2 %      Basophil % 0.3 %      Immature Grans % 2.2 %      Neutrophils, Absolute 1.68 10*3/mm3      Lymphocytes, Absolute 1.18 10*3/mm3      Monocytes, Absolute 0.17 10*3/mm3      Eosinophils, Absolute 0.07 10*3/mm3      Basophils, Absolute 0.01 10*3/mm3      Immature Grans, Absolute 0.07 10*3/mm3      nRBC 0.0 /100 WBC         Imaging Results (last 24 hours)     Procedure Component Value Units Date/Time    NM Lung Ventilation Perfusion [[id number]] Collected:  08/10/19 2209     Updated:  08/10/19 2214    Narrative:       NM LUNG VENTILATION PERFUSION- 8/10/2019 9:30 PM CDT     HISTORY: Cancer, shortness of breath       COMPARISON: Chest x-ray dated 08/10/2019.      RADIOPHARMACEUTICAL: 15 mCi of Xenon-133 for the ventilation study and  4.8 mCi Tc 99m MAA for the perfusion study.      TECHNIQUE: Following inhalation of the aerosolized radiopharmaceutical,  the ventilation study was performed with images of the thorax being  obtained in posterior projection. Thereafter, the perfusion study was  performed following the injection of radiolabeled MAA particles with  images of the thorax obtained in 6  projections.      FINDINGS:    There are multiple perfusion defects with associated radiographic  opacities.         Impression:       Findings are most commonly associated with intermediate probability for  acute pulmonary embolism.     This report was finalized on 08/10/2019 22:11 by Dr. Katie Peters MD.    CT Chest Without Contrast [[id number]] Collected:  08/10/19 2039     Updated:  08/10/19 2052    Narrative:       CT CHEST WO CONTRAST- 8/10/2019 7:50 PM CDT     HISTORY: left sided opacity, hx of ca, sob rule out infectious vs  malignancy.     COMPARISON: CT chest dated 06/27/2019     DOSE LENGTH PRODUCT: 194 mGy cm. Automated exposure control was also  utilized to decrease patient radiation dose.     TECHNIQUE: Serial helical tomographic images of the chest were acquired.  Multiplanar reformatted images were provided for review.     FINDINGS:  The imaged portion of the neck and thyroid gland is unremarkable.      Previously seen right upper lobe mass shows interval increase soft  tissue component, measuring 3.2 x 4.5 x 2.2 cm, previously 3.3 x 4.4 x 2  cm, with no cystic component on today's examination and there are  lobular septal thickening is more conspicuous on today's examination.  Innumerable subcentimeter pleural-based nodules are redemonstrated  posteriorly.     Chest x-ray findings seen earlier on today's examination probably  reflected artifact from technique. Specifically, there is no confluent  opacity corresponding with the finding on chest x-ray. There is no  pleural effusion. No pneumothorax. The airway remains patent.  Atherosclerotic disease with coronary artery disease.. There is no  pericardial effusion. No axillary lymphadenopathy by size criteria.  Mediastinal lymph nodes are overall similar.. Borderline cardiomegaly  redemonstrated     No acute findings are seen in the bones or surrounding soft tissues.     Similar bilateral perinephric fat stranding. Pneumobilia is  redemonstrated..        Impression:       1.  Increased soft tissue component in the known right upper lobe mass.  2.  Increase/more conspicuous interlobular septal thickening, with no  new discrete left-sided mass or nodule identified on today's  examination.  3.  Similar mediastinal lymphadenopathy.        This report was finalized on 08/10/2019 20:48 by Dr. Katie Peters MD.    XR Chest 1 View [[id number]] Collected:  08/10/19 1931     Updated:  08/10/19 1936    Narrative:       Exam:   XR CHEST 1 VW-       Date:  08/10/2019      History:  Male, age  79 years;sob     COMPARISON:  Chest x-ray dated 07/29/2019     Findings :  Right-sided chest port port in place.  The heart and mediastinum are normal in size. Right upper lobe opacity,  reflecting known mass. Ill-defined left midlung zone opacity. No  measurable pneumothorax.  The bones show no acute pathology.         Impression:       Impression:     Ill-defined left midlung zone opacity, a new finding. Correlation to  exclude infection.     This report was finalized on 08/10/2019 19:33 by Dr. Katie Peters MD.        I have personally reviewed and interpreted the radiology studies and ECG obtained at time of admission.     Assessment / Plan     Assessment:   Active Hospital Problems    Diagnosis   • Pneumonia due to infectious organism     Impression:  Pneumonia due to infectious organism  Microcytic anemia  Palpitations with converted SVT  Dyspnea  Lung cancer with ongoing therapy  Bundle branch block  Indeterminate VQ scan, given Lovenox in the ED  Chronic constipation  Chronic pancytopenia    Plan:   Azactam and vancomycin  Mucinex  Telemetry  Repeat EKG in the morning  Colace  Labs in the morning  Resume home medications  Respiratory culture    Code Status: Full     I discussed the patient's findings and my recommendations with the patient, no family at bedside    Estimated length of stay 2 to 3 days    Jen Peñaloza DO   08/11/19   12:32 AM

## 2019-08-11 NOTE — PROGRESS NOTES
Exercise Oximetry    Patient Name:Karoline Prater   MRN: [id number]   Date: 08/11/19             ROOM AIR BASELINE   SpO2% 93   Heart Rate 86   Blood Pressure      EXERCISE ON ROOM AIR SpO2% EXERCISE ON O2 @ LPM SpO2%   1 MINUTE 94% 1 MINUTE    2 MINUTES  2 MINUTES    3 MINUTES 92% 3 MINUTES    4 MINUTES  4 MINUTES    5 MINUTES  5 MINUTES    6 MINUTES 95% 6 MINUTES               Distance Walked hallway Distance Walked   Dyspnea (Pina Scale)   Dyspnea (Pina Scale)   Fatigue (Pian Scale)   Fatigue (Pina Scale)   SpO2% Post Exercise 93% SpO2% Post Exercise   HR Post Exercise  145 HR Post Exercise   Time to Recovery   Time to Recovery     Comments: Pt. Walked on RA down hallway and back.  Pt. Tolerated well will no SOB.  Pt. HR did get elevated during mid walk but pt. Had no SOB.  Discussed increased HR with nurse.  Pt. Was placed back in bed on RA with no problems.  Results reported.

## 2019-08-12 ENCOUNTER — READMISSION MANAGEMENT (OUTPATIENT)
Dept: CALL CENTER | Facility: HOSPITAL | Age: 79
End: 2019-08-12

## 2019-08-12 VITALS
SYSTOLIC BLOOD PRESSURE: 128 MMHG | HEIGHT: 66 IN | DIASTOLIC BLOOD PRESSURE: 78 MMHG | HEART RATE: 74 BPM | WEIGHT: 165.3 LBS | BODY MASS INDEX: 26.57 KG/M2

## 2019-08-12 DIAGNOSIS — N18.4 CHRONIC KIDNEY DISEASE, STAGE IV (SEVERE) (HCC): ICD-10-CM

## 2019-08-12 DIAGNOSIS — C78.89 SECONDARY MALIGNANT NEOPLASM OF OTHER DIGESTIVE ORGANS (HCC): ICD-10-CM

## 2019-08-12 DIAGNOSIS — D47.2 MONOCLONAL GAMMOPATHY: ICD-10-CM

## 2019-08-12 DIAGNOSIS — C80.1 MALIGNANT NEOPLASM (HCC): ICD-10-CM

## 2019-08-12 DIAGNOSIS — R53.83 OTHER FATIGUE: ICD-10-CM

## 2019-08-12 DIAGNOSIS — R63.4 ABNORMAL WEIGHT LOSS: ICD-10-CM

## 2019-08-12 DIAGNOSIS — K26.9 DUODENAL ULCER: ICD-10-CM

## 2019-08-12 DIAGNOSIS — D50.8 OTHER IRON DEFICIENCY ANEMIAS: ICD-10-CM

## 2019-08-12 DIAGNOSIS — I47.29 NON-SUSTAINED VENTRICULAR TACHYCARDIA: ICD-10-CM

## 2019-08-12 RX ORDER — DEXAMETHASONE 4 MG/1
4 TABLET ORAL SEE ADMIN INSTRUCTIONS
COMMUNITY
End: 2019-08-23 | Stop reason: SDUPTHER

## 2019-08-12 RX ORDER — FOLIC ACID 1 MG/1
1 TABLET ORAL DAILY
COMMUNITY
End: 2019-08-23 | Stop reason: SDUPTHER

## 2019-08-12 NOTE — OUTREACH NOTE
Prep Survey      Responses   Facility patient discharged from?  [facility]   Is patient eligible?  Yes   Discharge diagnosis  Dyspnea, Pneumonia due to infectious organism   Does the patient have one of the following disease processes/diagnoses(primary or secondary)?  Other   Does the patient have Home health ordered?  No   Is there a DME ordered?  No   Prep survey completed?  Yes          Roz Shearer RN

## 2019-08-13 ENCOUNTER — READMISSION MANAGEMENT (OUTPATIENT)
Dept: CALL CENTER | Facility: HOSPITAL | Age: 79
End: 2019-08-13

## 2019-08-13 LAB
BACTERIA SPEC RESP CULT: NORMAL
GRAM STN SPEC: NORMAL

## 2019-08-13 NOTE — OUTREACH NOTE
Medical Week 1 Survey      Responses   Facility patient discharged from?  [facility]   Does the patient have one of the following disease processes/diagnoses(primary or secondary)?  Other   Is there a successful TCM telephone encounter documented?  No   Week 1 attempt successful?  Yes   Call start time  1425   Call end time  1430   Discharge diagnosis  Dyspnea, lung cancer   Is patient permission given to speak with other caregiver?  No   Meds reviewed with patient/caregiver?  Yes   Is the patient having any side effects they believe may be caused by any medication additions or changes?  No   Does the patient have all medications ordered at discharge?  Yes   Is the patient taking all medications as directed (includes completed medication regime)?  Yes   Does the patient have a primary care provider?   Yes   Does the patient have an appointment with their PCP within 7 days of discharge?  Yes   Comments regarding PCP  Irving Alexis MD. Patient states that he saw PCP today.    Has the patient kept scheduled appointments due by today?  Yes   Has home health visited the patient within 72 hours of discharge?  N/A   Psychosocial issues?  No   Did the patient receive a copy of their discharge instructions?  Yes   Nursing interventions  Reviewed instructions with patient   What is the patient's perception of their health status since discharge?  Improving   Is the patient/caregiver able to teach back signs and symptoms related to disease process for when to call PCP?  Yes   Is the patient/caregiver able to teach back signs and symptoms related to disease process for when to call 911?  Yes   Is the patient/caregiver able to teach back the hierarchy of who to call/visit for symptoms/problems? PCP, Specialist, Home health nurse, Urgent Care, ED, 911  Yes   Week 1 call completed?  Yes          Albertina Sapp RN

## 2019-08-15 LAB
BACTERIA SPEC AEROBE CULT: NORMAL
BACTERIA SPEC AEROBE CULT: NORMAL

## 2019-08-20 ENCOUNTER — READMISSION MANAGEMENT (OUTPATIENT)
Dept: CALL CENTER | Facility: HOSPITAL | Age: 79
End: 2019-08-20

## 2019-08-20 ENCOUNTER — OFFICE VISIT (OUTPATIENT)
Dept: CARDIOLOGY | Age: 79
End: 2019-08-20
Payer: MEDICARE

## 2019-08-20 VITALS
WEIGHT: 162 LBS | HEART RATE: 72 BPM | DIASTOLIC BLOOD PRESSURE: 78 MMHG | SYSTOLIC BLOOD PRESSURE: 138 MMHG | HEIGHT: 66 IN | BODY MASS INDEX: 26.03 KG/M2

## 2019-08-20 DIAGNOSIS — I25.10 MILD CAD: ICD-10-CM

## 2019-08-20 DIAGNOSIS — I10 ESSENTIAL HYPERTENSION: Primary | ICD-10-CM

## 2019-08-20 PROCEDURE — 1036F TOBACCO NON-USER: CPT | Performed by: INTERNAL MEDICINE

## 2019-08-20 PROCEDURE — G8427 DOCREV CUR MEDS BY ELIG CLIN: HCPCS | Performed by: INTERNAL MEDICINE

## 2019-08-20 PROCEDURE — G8598 ASA/ANTIPLAT THER USED: HCPCS | Performed by: INTERNAL MEDICINE

## 2019-08-20 PROCEDURE — G8419 CALC BMI OUT NRM PARAM NOF/U: HCPCS | Performed by: INTERNAL MEDICINE

## 2019-08-20 PROCEDURE — 1123F ACP DISCUSS/DSCN MKR DOCD: CPT | Performed by: INTERNAL MEDICINE

## 2019-08-20 PROCEDURE — 4040F PNEUMOC VAC/ADMIN/RCVD: CPT | Performed by: INTERNAL MEDICINE

## 2019-08-20 PROCEDURE — 99212 OFFICE O/P EST SF 10 MIN: CPT | Performed by: INTERNAL MEDICINE

## 2019-08-20 RX ORDER — FLECAINIDE ACETATE 50 MG/1
50 TABLET ORAL 2 TIMES DAILY
COMMUNITY
End: 2021-06-18 | Stop reason: ALTCHOICE

## 2019-08-20 NOTE — OUTREACH NOTE
Medical Week 2 Survey      Responses   Facility patient discharged from?  [facility]   Does the patient have one of the following disease processes/diagnoses(primary or secondary)?  Other   Week 2 attempt successful?  Yes   Call start time  1502   Call end time  1505   Meds reviewed with patient/caregiver?  Yes   Is the patient taking all medications as directed (includes completed medication regime)?  Yes   Does the patient have a primary care provider?   Yes   Comments regarding PCP  has been seen right after Discharge   Has the patient kept scheduled appointments due by today?  Yes   What is the patient's perception of their health status since discharge?  Improving   Week 2 Call Completed?  Yes   Graduated  Yes   Did the patient feel the follow up calls were helpful during their recovery period?  Yes   Was the number of calls appropriate?  Yes   Graduated/Revoked comments  he states no real need for the calls, he is feeling fine.          Irena Hickman, RN

## 2019-08-20 NOTE — PROGRESS NOTES
deficiency anemias     Secondary malignant neoplasm of other digestive organs ([facility] [address]     Weight loss      Past Surgical History:   Procedure Laterality Date    BRONCHOSCOPY  11/27/2018    dx of adenocarcinoma RUL  Dr. April Argueta CATH LAB PROCEDURE      ENDOSCOPY, COLON, DIAGNOSTIC  03/30/2019    aborted d/t vtach    EYE SURGERY Left     EYE SURGERY  1964    FOOT SURGERY      removal of joint from right toe from drilling accident, [address]  10/29/2003    with resection  Mitul-en-Y proecedure  DR. Lizbeth Espinosa    TUNNELED VENOUS PORT PLACEMENT      UPPER GASTROINTESTINAL ENDOSCOPY N/A 3/13/2019    EGD W/EUS FNA performed by Yobany Vigil MD at [address] [facility]     Family History   Problem Relation Age of Onset    Osteoporosis Mother     High Blood Pressure Mother     Asthma Mother     Bleeding Prob Father     Cancer Father         leukemia    Asthma Brother      Social History     Tobacco Use    Smoking status: Former Smoker     Packs/day: 2.00     Years: 40.00     Pack years: 80.00     Types: Cigarettes     Last attempt to quit: 10/29/2003     Years since quitting: 15.8    Smokeless tobacco: Never Used   Substance Use Topics    Alcohol use: No          Review of Systems:    General:      Complaint / Symptom Yes / No / Description if Yes       Fatigue No   Weight gain N/A   Insomnia N/A       Respiratory:        Complaint / Symptom Yes / No / Description if Yes       Cough No   Horseness N/A       Cardiovascular:    Complaint / Symptom Yes / No / Description if Yes       Chest Pain No   Shortness of Air / Orthopnea No   Presyncope / Syncope No   Palpitations No         Objective:    /78   Pulse 72   Ht 5' 6\" (1.676 m)   Wt 162 lb (73.5 kg)   BMI 26.15 kg/m²     GENERAL - well developed and well nourished, conversant  HEENT -  PERRLA, Hearing appears normal  NECK - no thyromegaly, no JVD, trachea is in the

## 2019-08-23 ENCOUNTER — HOSPITAL ENCOUNTER (OUTPATIENT)
Dept: INFUSION THERAPY | Age: 79
Discharge: HOME | End: 2019-08-23
Payer: MEDICARE

## 2019-08-23 DIAGNOSIS — C34.90 NON-SMALL CELL LUNG CANCER, UNSPECIFIED LATERALITY (HCC): ICD-10-CM

## 2019-08-23 DIAGNOSIS — C34.11 MALIGNANT NEOPLASM OF UPPER LOBE, RIGHT BRONCHUS OR LUNG (HCC): Primary | ICD-10-CM

## 2019-08-23 PROCEDURE — 85025 COMPLETE CBC W/AUTO DIFF WBC: CPT

## 2019-08-23 PROCEDURE — 6360000002 HC RX W HCPCS: Performed by: PHYSICIAN ASSISTANT

## 2019-08-23 PROCEDURE — 96413 CHEMO IV INFUSION 1 HR: CPT

## 2019-08-23 PROCEDURE — 96417 CHEMO IV INFUS EACH ADDL SEQ: CPT

## 2019-08-23 PROCEDURE — 96375 TX/PRO/DX INJ NEW DRUG ADDON: CPT

## 2019-08-23 PROCEDURE — 2580000003 HC RX 258: Performed by: PHYSICIAN ASSISTANT

## 2019-08-23 RX ORDER — PALONOSETRON 0.05 MG/ML
0.25 INJECTION, SOLUTION INTRAVENOUS ONCE
Status: DISCONTINUED | OUTPATIENT
Start: 2019-08-23 | End: 2019-08-25 | Stop reason: HOSPADM

## 2019-08-23 RX ORDER — SODIUM CHLORIDE 0.9 % (FLUSH) 0.9 %
10 SYRINGE (ML) INJECTION PRN
Status: DISCONTINUED | OUTPATIENT
Start: 2019-08-23 | End: 2019-08-24 | Stop reason: HOSPADM

## 2019-08-23 RX ORDER — DEXAMETHASONE 4 MG/1
4 TABLET ORAL SEE ADMIN INSTRUCTIONS
Qty: 24 TABLET | Refills: 2 | Status: SHIPPED | OUTPATIENT
Start: 2019-08-23 | End: 2020-06-01

## 2019-08-23 RX ORDER — FOLIC ACID 1 MG/1
1 TABLET ORAL DAILY
Qty: 60 TABLET | Refills: 3 | Status: SHIPPED | OUTPATIENT
Start: 2019-08-23 | End: 2020-05-12

## 2019-08-23 RX ORDER — METHYLPREDNISOLONE SODIUM SUCCINATE 125 MG/2ML
125 INJECTION, POWDER, LYOPHILIZED, FOR SOLUTION INTRAMUSCULAR; INTRAVENOUS PRN
Status: DISCONTINUED | OUTPATIENT
Start: 2019-08-23 | End: 2019-08-25 | Stop reason: HOSPADM

## 2019-08-23 RX ORDER — DEXAMETHASONE SODIUM PHOSPHATE 10 MG/ML
10 INJECTION, SOLUTION INTRAMUSCULAR; INTRAVENOUS ONCE
Status: DISCONTINUED | OUTPATIENT
Start: 2019-08-23 | End: 2019-08-25 | Stop reason: HOSPADM

## 2019-08-23 RX ORDER — DIPHENHYDRAMINE HYDROCHLORIDE 50 MG/ML
50 INJECTION INTRAMUSCULAR; INTRAVENOUS PRN
Status: DISCONTINUED | OUTPATIENT
Start: 2019-08-23 | End: 2019-08-25 | Stop reason: HOSPADM

## 2019-08-23 RX ORDER — HEPARIN SODIUM (PORCINE) LOCK FLUSH IV SOLN 100 UNIT/ML 100 UNIT/ML
500 SOLUTION INTRAVENOUS PRN
Status: DISCONTINUED | OUTPATIENT
Start: 2019-08-23 | End: 2019-08-24 | Stop reason: HOSPADM

## 2019-08-23 RX ORDER — SODIUM CHLORIDE 0.9 % (FLUSH) 0.9 %
5 SYRINGE (ML) INJECTION PRN
Status: DISCONTINUED | OUTPATIENT
Start: 2019-08-23 | End: 2019-08-24 | Stop reason: HOSPADM

## 2019-08-23 RX ORDER — EPINEPHRINE 1 MG/ML
0.3 INJECTION, SOLUTION, CONCENTRATE INTRAVENOUS PRN
Status: DISCONTINUED | OUTPATIENT
Start: 2019-08-23 | End: 2019-08-25 | Stop reason: HOSPADM

## 2019-08-23 RX ORDER — SODIUM CHLORIDE 9 MG/ML
20 INJECTION, SOLUTION INTRAVENOUS ONCE
Status: DISCONTINUED | OUTPATIENT
Start: 2019-08-23 | End: 2019-08-25 | Stop reason: HOSPADM

## 2019-09-12 NOTE — PROGRESS NOTES
Progress Note      Pt Name: Raúl Stahl: 1940  MRN: [id number]    Date of evaluation: 2019  History Obtained From:  patient, electronic medical record    CHIEF COMPLAINT:    Chief Complaint   Patient presents with    Lung Cancer     Current active problems  Lung carcinoma  Iron deficiency anemia    HISTORY OF PRESENT ILLNESS:    Lou Wheeler is a 70 y.o.  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 He completed 4 cycles of combination chemotherapy with carboplatin, Keytruda, Alimta. He is now getting maintenance therapy with Keytruda and Alimta every 3 weeks. He has fatigue issues that improves right before his next cycle. He has been having some issues with his allergies with sinus congestion. He does have a diagnosis of CHF without exacerbation. He has hypertension but his blood pressure has been doing okay. He denies any exacerbation of COPD. His gout has been controlled very well. TUMOR HISTORY: Adenocarcinoma on the RUL of the lung 2018  Teagan Mendoza had CT scans performed for new onset of weight loss of 13 pounds over the previous year , associated with increased fatigue. He denied respiratory symptoms of shortness of air, cough or productive sputum. A CT scan of the chest on 2018 had been ordered by Dr. Mabel Paiz due to weight loss and identified a new lobulated right upper lobe 5.7 cm mass that extended back to the right hilum. Numerous mediastinal lymph nodes ranging in size from mm to 1.3 cm and a subcarnial lymph node that measured 1.5 x 1.5 x 3.5 cm. A CT scan of the abdomen and pelvis with contrast on 2018 documented a 4.9 x 4 x 4 cm soft tissue mass with peripheral calcification immediately adjacent to the gallbladder in the head of the pancreas area.     An MRI of the abdomen and pelvis W & WO on 10/11/2018 identified in the 4.1 x 3.4 cm mass was again encountered in the subhepatic space which abutted the distal stomach and proximal duodenum measuring 8.9 x 5.4 cm which has increased considerably from a prior measurement of 4.1 x 3.4 cm. Medial to the left renal hilum a 3.5 cm lobular mass causing some mild left-sided hydronephrosis. Mr. Roya Dubon was referred to Dr. Griselda Conradi who saw him on 1/22/2019 anticipating plans for a curative resection. Dr. Alan Parkinson received a phone call on 2/20/2019 from Dr. Maryjane Villa , indicating that the previously documented an abdominal pancreatic area mass had doubled in size and was causing compression into the left kidney/renal pelvis producing a hydroureter. Dr. Maryjane Villa arranged a referral to Dr. Olena Cornejo who will be placing a stent 3/8/2019. Mr. Roya Dubon was scheduled to be seen by gastroenterology at [facility] on 3/13/2019 for EGD/EUS assessment and biopsy of the enlarging peripancreatic/duodenal area mass. With a decrease in the lung mass and increased size of the subhepatic mass-surgical resection was abandoned at present pending further evaluation of the subhepatic mass. Dr. Alan Parkinson discussed treatment options with the unresectable lung mass and the per he pancreatic mass and recommended a combination of Keytruda, Alimta, carboplatin. He was subsequently admitted to [facility] 4/26 - 4/30/2019 with abdominal pain and melena. WBC fantasma was 0.75 with ANC 0.34. PLT did drop to 24,000. He did have duodenal ulcerations that were bleeding on endoscopy. He was stabilized but then readmitted from 5/8 - 5/10/2019 with recurrent melanoma. A repeat endoscopy was performed. It was felt that exacerbation of his bleeding from the duodenal came about by his thrombocytopenia from treatment. Subsequent dosing was adjusted and Neulasta was added.     CT chest without contrast at [facility] on 6/27/2019 was compared to 2/18/2019 revealed that the right lung mass that extended into the right hilum has decreased from 5.2 x 3.5 down to Other fatigue     Other iron deficiency anemias     Secondary malignant neoplasm of other digestive organs ([address]     Weight loss      Past Surgical History:    Past Surgical History:   Procedure Laterality Date    BRONCHOSCOPY  11/27/2018    dx of adenocarcinoma RUL  Dr. Sarah Blount CATH LAB PROCEDURE      ENDOSCOPY, COLON, DIAGNOSTIC  03/30/2019    aborted d/t vtach    EYE SURGERY Left     EYE SURGERY  1964    FOOT SURGERY      removal of joint from right toe from drilling accident, [address]  10/29/2003    with resection  Mitul-en-Y proecedure  DR. Jay Linares    TUNNELED VENOUS PORT PLACEMENT      UPPER GASTROINTESTINAL ENDOSCOPY N/A 3/13/2019    EGD W/EUS FNA performed by Maxwell Brannon MD at [facility]     Current Medications:      Current Outpatient Medications:     folic acid (FOLVITE) 1 MG tablet, Take 1 tablet by mouth daily, Disp: 60 tablet, Rfl: 3    dexamethasone (DECADRON) 4 MG tablet, Take 1 tablet by mouth See Admin Instructions TAKE 4MG BID ON DAY BEFORE CHEMO, THE DAY OF CHEMO, AND THE DAY AFTER EVERY 21 DAYS, Disp: 24 tablet, Rfl: 2    flecainide (TAMBOCOR) 50 MG tablet, Take 50 mg by mouth 2 times daily, Disp: , Rfl:     sodium bicarbonate 650 MG tablet, Take 650 mg by mouth 2 times daily, Disp: , Rfl:     pantoprazole (PROTONIX) 40 MG tablet, Take 40 mg by mouth daily, Disp: , Rfl:     tamsulosin (FLOMAX) 0.4 MG capsule, Take 0.4 mg by mouth daily, Disp: , Rfl:     metoprolol tartrate (LOPRESSOR) 25 MG tablet, Take 0.5 tablets by mouth 2 times daily, Disp: 60 tablet, Rfl: 0    Multiple Vitamin (MULTI VITAMIN DAILY PO), Take by mouth daily , Disp: , Rfl:      Allergies:    Allergies   Allergen Reactions    Penicillins      Social History:    Social History     Tobacco Use    Smoking status: Former Smoker     Packs/day: 2.00     Years: 40.00     Pack years: 80.00     Types: Cigarettes improvement. He has been doing fairly well with the Alimta/Keytruda. He has some mild fatigue. He has not had any significant diarrhea, no shortness of breath. CBC today reveals    PLAN  · We will recheck his CMP, TSH. · Alimta/Keytruda dose #7 -carboplatin was discontinued after #4 -today. · He will continue with his Alimta, Keytruda every 3 weeks    · We will have a repeat CT of the chest, abdomen and pelvis in 5 weeks at [facility]. 2.   Iron deficiency anemia. .?  Serology 7/5/2019  B12 - 1026  Folate - 20  Haptoglobin - 241  Reticulocyte - 3.3%  LDH - 752 ([phone])  TSH 0.71     Hgb today is 8.5. I am going to recheck his iron levels to see if he may benefit from iron replacement therapy. We will need to consider starting him on Procrit. Orders Placed This Encounter   Procedures    CT Chest WO Contrast     Standing Status:   Future     Standing Expiration Date:   9/13/2020     Scheduling Instructions:      [facility]     Order Specific Question:   Reason for exam:     Answer:   Lung cancer evaluate response    CT ABDOMEN PELVIS WO CONTRAST Additional Contrast? Oral     Standing Status:   Future     Standing Expiration Date:   9/13/2020     Scheduling Instructions:      [facility]     Order Specific Question:   Additional Contrast?     Answer:   Oral     Order Specific Question:   Reason for exam:     Answer:   Lung cancer evaluate response    CBC Auto Differential     Standing Status:   Future     Standing Expiration Date:   9/13/2020    Comprehensive Metabolic Panel     Standing Status:   Future     Standing Expiration Date:   9/13/2020    TSH without Reflex     Standing Status:   Future     Standing Expiration Date:   9/13/2020    Ferritin     Standing Status:   Future     Standing Expiration Date:   9/13/2020    Iron and TIBC     Standing Status:   Future     Standing Expiration Date:   9/13/2020     Order Specific Question:   Is Patient Fasting?      Answer:   no     Order Specific Question:   No

## 2019-09-13 ENCOUNTER — TRANSCRIBE ORDERS (OUTPATIENT)
Dept: ADMINISTRATIVE | Facility: HOSPITAL | Age: 79
End: 2019-09-13

## 2019-09-13 ENCOUNTER — OFFICE VISIT (OUTPATIENT)
Dept: HEMATOLOGY | Age: 79
End: 2019-09-13
Payer: MEDICARE

## 2019-09-13 ENCOUNTER — HOSPITAL ENCOUNTER (OUTPATIENT)
Dept: INFUSION THERAPY | Age: 79
Discharge: HOME | End: 2019-09-13
Payer: MEDICARE

## 2019-09-13 VITALS
DIASTOLIC BLOOD PRESSURE: 82 MMHG | SYSTOLIC BLOOD PRESSURE: 170 MMHG | HEIGHT: 66 IN | TEMPERATURE: 96.5 F | WEIGHT: 164 LBS | OXYGEN SATURATION: 100 % | BODY MASS INDEX: 26.36 KG/M2 | HEART RATE: 74 BPM

## 2019-09-13 DIAGNOSIS — C34.11 MALIGNANT NEOPLASM OF UPPER LOBE, RIGHT BRONCHUS OR LUNG (HCC): Primary | ICD-10-CM

## 2019-09-13 DIAGNOSIS — D64.9 FATIGUE ASSOCIATED WITH ANEMIA: ICD-10-CM

## 2019-09-13 DIAGNOSIS — C34.90 NON-SMALL CELL LUNG CANCER, UNSPECIFIED LATERALITY (HCC): ICD-10-CM

## 2019-09-13 DIAGNOSIS — D50.0 IRON DEFICIENCY ANEMIA DUE TO CHRONIC BLOOD LOSS: ICD-10-CM

## 2019-09-13 DIAGNOSIS — C34.11 MALIGNANT NEOPLASM OF UPPER LOBE OF RIGHT LUNG (HCC): Primary | ICD-10-CM

## 2019-09-13 DIAGNOSIS — C78.89 SECONDARY MALIGNANT NEOPLASM OF OTHER DIGESTIVE ORGANS (HCC): ICD-10-CM

## 2019-09-13 PROCEDURE — 85025 COMPLETE CBC W/AUTO DIFF WBC: CPT

## 2019-09-13 PROCEDURE — 96413 CHEMO IV INFUSION 1 HR: CPT

## 2019-09-13 PROCEDURE — 99213 OFFICE O/P EST LOW 20 MIN: CPT | Performed by: PHYSICIAN ASSISTANT

## 2019-09-13 PROCEDURE — 96375 TX/PRO/DX INJ NEW DRUG ADDON: CPT

## 2019-09-13 PROCEDURE — 6360000002 HC RX W HCPCS: Performed by: PHYSICIAN ASSISTANT

## 2019-09-13 PROCEDURE — 2580000003 HC RX 258: Performed by: PHYSICIAN ASSISTANT

## 2019-09-13 PROCEDURE — 96417 CHEMO IV INFUS EACH ADDL SEQ: CPT

## 2019-09-13 RX ORDER — EPINEPHRINE 1 MG/ML
0.3 INJECTION, SOLUTION, CONCENTRATE INTRAVENOUS PRN
Status: DISCONTINUED | OUTPATIENT
Start: 2019-09-13 | End: 2019-09-15 | Stop reason: HOSPADM

## 2019-09-13 RX ORDER — METHYLPREDNISOLONE SODIUM SUCCINATE 125 MG/2ML
125 INJECTION, POWDER, LYOPHILIZED, FOR SOLUTION INTRAMUSCULAR; INTRAVENOUS PRN
Status: DISCONTINUED | OUTPATIENT
Start: 2019-09-13 | End: 2019-09-15 | Stop reason: HOSPADM

## 2019-09-13 RX ORDER — DEXAMETHASONE SODIUM PHOSPHATE 10 MG/ML
10 INJECTION, SOLUTION INTRAMUSCULAR; INTRAVENOUS ONCE
Status: DISCONTINUED | OUTPATIENT
Start: 2019-09-13 | End: 2019-09-15 | Stop reason: HOSPADM

## 2019-09-13 RX ORDER — PALONOSETRON 0.05 MG/ML
0.25 INJECTION, SOLUTION INTRAVENOUS ONCE
Status: DISCONTINUED | OUTPATIENT
Start: 2019-09-13 | End: 2019-09-15 | Stop reason: HOSPADM

## 2019-09-13 RX ORDER — SODIUM CHLORIDE 0.9 % (FLUSH) 0.9 %
10 SYRINGE (ML) INJECTION PRN
Status: DISCONTINUED | OUTPATIENT
Start: 2019-09-13 | End: 2019-09-14 | Stop reason: HOSPADM

## 2019-09-13 RX ORDER — DIPHENHYDRAMINE HYDROCHLORIDE 50 MG/ML
50 INJECTION INTRAMUSCULAR; INTRAVENOUS PRN
Status: DISCONTINUED | OUTPATIENT
Start: 2019-09-13 | End: 2019-09-15 | Stop reason: HOSPADM

## 2019-09-13 RX ORDER — SODIUM CHLORIDE 0.9 % (FLUSH) 0.9 %
5 SYRINGE (ML) INJECTION PRN
Status: DISCONTINUED | OUTPATIENT
Start: 2019-09-13 | End: 2019-09-14 | Stop reason: HOSPADM

## 2019-09-13 RX ORDER — HEPARIN SODIUM (PORCINE) LOCK FLUSH IV SOLN 100 UNIT/ML 100 UNIT/ML
500 SOLUTION INTRAVENOUS PRN
Status: DISCONTINUED | OUTPATIENT
Start: 2019-09-13 | End: 2019-09-14 | Stop reason: HOSPADM

## 2019-09-13 RX ORDER — SODIUM CHLORIDE 9 MG/ML
20 INJECTION, SOLUTION INTRAVENOUS ONCE
Status: DISCONTINUED | OUTPATIENT
Start: 2019-09-13 | End: 2019-09-13

## 2019-09-13 ASSESSMENT — ENCOUNTER SYMPTOMS
SHORTNESS OF BREATH: 1
NAUSEA: 0
WHEEZING: 0
SINUS PRESSURE: 1
VOICE CHANGE: 0
ABDOMINAL DISTENTION: 0
DIARRHEA: 0
BLOOD IN STOOL: 0
SORE THROAT: 0
CONSTIPATION: 0
ABDOMINAL PAIN: 0
COLOR CHANGE: 0
PHOTOPHOBIA: 0
EYE ITCHING: 0
COUGH: 0
VOMITING: 0
BACK PAIN: 0
EYE DISCHARGE: 0
TROUBLE SWALLOWING: 0

## 2019-10-03 ENCOUNTER — HOSPITAL ENCOUNTER (OUTPATIENT)
Dept: INFUSION THERAPY | Age: 79
Discharge: HOME | End: 2019-10-03
Payer: MEDICARE

## 2019-10-03 DIAGNOSIS — C78.89 SECONDARY MALIGNANT NEOPLASM OF OTHER DIGESTIVE ORGANS (HCC): ICD-10-CM

## 2019-10-03 DIAGNOSIS — C34.11 MALIGNANT NEOPLASM OF UPPER LOBE, RIGHT BRONCHUS OR LUNG (HCC): Primary | ICD-10-CM

## 2019-10-03 DIAGNOSIS — D50.0 IRON DEFICIENCY ANEMIA DUE TO CHRONIC BLOOD LOSS: ICD-10-CM

## 2019-10-03 DIAGNOSIS — C34.90 NON-SMALL CELL LUNG CANCER, UNSPECIFIED LATERALITY (HCC): ICD-10-CM

## 2019-10-03 PROCEDURE — 2580000003 HC RX 258: Performed by: INTERNAL MEDICINE

## 2019-10-03 PROCEDURE — 85025 COMPLETE CBC W/AUTO DIFF WBC: CPT

## 2019-10-03 PROCEDURE — 36415 COLL VENOUS BLD VENIPUNCTURE: CPT

## 2019-10-03 PROCEDURE — 6360000002 HC RX W HCPCS

## 2019-10-03 PROCEDURE — 96413 CHEMO IV INFUSION 1 HR: CPT

## 2019-10-03 PROCEDURE — 80053 COMPREHEN METABOLIC PANEL: CPT

## 2019-10-03 PROCEDURE — 6360000002 HC RX W HCPCS: Performed by: INTERNAL MEDICINE

## 2019-10-03 PROCEDURE — 96417 CHEMO IV INFUS EACH ADDL SEQ: CPT

## 2019-10-03 PROCEDURE — 96375 TX/PRO/DX INJ NEW DRUG ADDON: CPT

## 2019-10-03 RX ORDER — DIPHENHYDRAMINE HYDROCHLORIDE 50 MG/ML
50 INJECTION INTRAMUSCULAR; INTRAVENOUS PRN
Status: DISCONTINUED | OUTPATIENT
Start: 2019-10-03 | End: 2019-10-05 | Stop reason: HOSPADM

## 2019-10-03 RX ORDER — PALONOSETRON 0.05 MG/ML
0.25 INJECTION, SOLUTION INTRAVENOUS ONCE
Status: DISCONTINUED | OUTPATIENT
Start: 2019-10-03 | End: 2019-10-05 | Stop reason: HOSPADM

## 2019-10-03 RX ORDER — HEPARIN SODIUM (PORCINE) LOCK FLUSH IV SOLN 100 UNIT/ML 100 UNIT/ML
500 SOLUTION INTRAVENOUS PRN
Status: DISCONTINUED | OUTPATIENT
Start: 2019-10-03 | End: 2019-10-04 | Stop reason: HOSPADM

## 2019-10-03 RX ORDER — DEXAMETHASONE SODIUM PHOSPHATE 10 MG/ML
10 INJECTION, SOLUTION INTRAMUSCULAR; INTRAVENOUS ONCE
Status: DISCONTINUED | OUTPATIENT
Start: 2019-10-03 | End: 2019-10-05 | Stop reason: HOSPADM

## 2019-10-03 RX ORDER — SODIUM CHLORIDE 0.9 % (FLUSH) 0.9 %
5 SYRINGE (ML) INJECTION PRN
Status: DISCONTINUED | OUTPATIENT
Start: 2019-10-03 | End: 2019-10-04 | Stop reason: HOSPADM

## 2019-10-03 RX ORDER — METHYLPREDNISOLONE SODIUM SUCCINATE 125 MG/2ML
125 INJECTION, POWDER, LYOPHILIZED, FOR SOLUTION INTRAMUSCULAR; INTRAVENOUS PRN
Status: DISCONTINUED | OUTPATIENT
Start: 2019-10-03 | End: 2019-10-05 | Stop reason: HOSPADM

## 2019-10-03 RX ORDER — SODIUM CHLORIDE 0.9 % (FLUSH) 0.9 %
10 SYRINGE (ML) INJECTION PRN
Status: DISCONTINUED | OUTPATIENT
Start: 2019-10-03 | End: 2019-10-04 | Stop reason: HOSPADM

## 2019-10-03 RX ORDER — EPINEPHRINE 1 MG/ML
0.3 INJECTION, SOLUTION, CONCENTRATE INTRAVENOUS PRN
Status: DISCONTINUED | OUTPATIENT
Start: 2019-10-03 | End: 2019-10-05 | Stop reason: HOSPADM

## 2019-10-03 RX ORDER — SODIUM CHLORIDE 9 MG/ML
20 INJECTION, SOLUTION INTRAVENOUS ONCE
Status: DISCONTINUED | OUTPATIENT
Start: 2019-10-03 | End: 2019-10-03

## 2019-10-11 ENCOUNTER — HOSPITAL ENCOUNTER (OUTPATIENT)
Dept: INFUSION THERAPY | Age: 79
Setting detail: INFUSION SERIES
Discharge: HOME | End: 2019-10-11
Payer: MEDICARE

## 2019-10-11 ENCOUNTER — OFFICE VISIT (OUTPATIENT)
Dept: HEMATOLOGY | Age: 79
End: 2019-10-11
Payer: MEDICARE

## 2019-10-11 ENCOUNTER — HOSPITAL ENCOUNTER (OUTPATIENT)
Dept: INFUSION THERAPY | Age: 79
Discharge: HOME | End: 2019-10-11
Payer: MEDICARE

## 2019-10-11 VITALS
BODY MASS INDEX: 25.73 KG/M2 | HEART RATE: 86 BPM | WEIGHT: 160.1 LBS | SYSTOLIC BLOOD PRESSURE: 110 MMHG | HEIGHT: 66 IN | OXYGEN SATURATION: 90 % | DIASTOLIC BLOOD PRESSURE: 62 MMHG

## 2019-10-11 VITALS
RESPIRATION RATE: 17 BRPM | TEMPERATURE: 98.5 F | HEART RATE: 86 BPM | SYSTOLIC BLOOD PRESSURE: 149 MMHG | DIASTOLIC BLOOD PRESSURE: 83 MMHG

## 2019-10-11 DIAGNOSIS — D63.1 ANEMIA, CHRONIC RENAL FAILURE, STAGE 3 (MODERATE) (HCC): ICD-10-CM

## 2019-10-11 DIAGNOSIS — C34.11 MALIGNANT NEOPLASM OF UPPER LOBE, RIGHT BRONCHUS OR LUNG (HCC): ICD-10-CM

## 2019-10-11 DIAGNOSIS — C34.90 NON-SMALL CELL LUNG CANCER, UNSPECIFIED LATERALITY (HCC): Primary | ICD-10-CM

## 2019-10-11 DIAGNOSIS — N18.30 ANEMIA, CHRONIC RENAL FAILURE, STAGE 3 (MODERATE) (HCC): ICD-10-CM

## 2019-10-11 DIAGNOSIS — N18.4 CHRONIC KIDNEY DISEASE, STAGE IV (SEVERE) (HCC): Primary | ICD-10-CM

## 2019-10-11 DIAGNOSIS — K59.03 DRUG-INDUCED CONSTIPATION: ICD-10-CM

## 2019-10-11 DIAGNOSIS — C80.1 MALIGNANT NEOPLASM (HCC): ICD-10-CM

## 2019-10-11 DIAGNOSIS — C34.90 NON-SMALL CELL LUNG CANCER, UNSPECIFIED LATERALITY (HCC): ICD-10-CM

## 2019-10-11 PROCEDURE — G8598 ASA/ANTIPLAT THER USED: HCPCS | Performed by: PHYSICIAN ASSISTANT

## 2019-10-11 PROCEDURE — 86901 BLOOD TYPING SEROLOGIC RH(D): CPT

## 2019-10-11 PROCEDURE — 6360000002 HC RX W HCPCS: Performed by: PHYSICIAN ASSISTANT

## 2019-10-11 PROCEDURE — 96374 THER/PROPH/DIAG INJ IV PUSH: CPT

## 2019-10-11 PROCEDURE — P9016 RBC LEUKOCYTES REDUCED: HCPCS

## 2019-10-11 PROCEDURE — 96375 TX/PRO/DX INJ NEW DRUG ADDON: CPT

## 2019-10-11 PROCEDURE — 36430 TRANSFUSION BLD/BLD COMPNT: CPT

## 2019-10-11 PROCEDURE — 85025 COMPLETE CBC W/AUTO DIFF WBC: CPT

## 2019-10-11 PROCEDURE — 1036F TOBACCO NON-USER: CPT | Performed by: PHYSICIAN ASSISTANT

## 2019-10-11 PROCEDURE — 86850 RBC ANTIBODY SCREEN: CPT

## 2019-10-11 PROCEDURE — 4040F PNEUMOC VAC/ADMIN/RCVD: CPT | Performed by: PHYSICIAN ASSISTANT

## 2019-10-11 PROCEDURE — 6370000000 HC RX 637 (ALT 250 FOR IP): Performed by: PHYSICIAN ASSISTANT

## 2019-10-11 PROCEDURE — G8427 DOCREV CUR MEDS BY ELIG CLIN: HCPCS | Performed by: PHYSICIAN ASSISTANT

## 2019-10-11 PROCEDURE — 2580000003 HC RX 258: Performed by: PHYSICIAN ASSISTANT

## 2019-10-11 PROCEDURE — G8419 CALC BMI OUT NRM PARAM NOF/U: HCPCS | Performed by: PHYSICIAN ASSISTANT

## 2019-10-11 PROCEDURE — 1123F ACP DISCUSS/DSCN MKR DOCD: CPT | Performed by: PHYSICIAN ASSISTANT

## 2019-10-11 PROCEDURE — 99213 OFFICE O/P EST LOW 20 MIN: CPT | Performed by: PHYSICIAN ASSISTANT

## 2019-10-11 PROCEDURE — 96372 THER/PROPH/DIAG INJ SC/IM: CPT

## 2019-10-11 PROCEDURE — G8484 FLU IMMUNIZE NO ADMIN: HCPCS | Performed by: PHYSICIAN ASSISTANT

## 2019-10-11 PROCEDURE — 86923 COMPATIBILITY TEST ELECTRIC: CPT

## 2019-10-11 PROCEDURE — 86900 BLOOD TYPING SEROLOGIC ABO: CPT

## 2019-10-11 RX ORDER — FUROSEMIDE 10 MG/ML
20 INJECTION INTRAMUSCULAR; INTRAVENOUS ONCE
Status: COMPLETED | OUTPATIENT
Start: 2019-10-11 | End: 2019-10-11

## 2019-10-11 RX ORDER — SODIUM CHLORIDE 0.9 % (FLUSH) 0.9 %
20 SYRINGE (ML) INJECTION PRN
Status: DISCONTINUED | OUTPATIENT
Start: 2019-10-11 | End: 2019-10-13 | Stop reason: HOSPADM

## 2019-10-11 RX ORDER — METHYLPREDNISOLONE SODIUM SUCCINATE 125 MG/2ML
125 INJECTION, POWDER, LYOPHILIZED, FOR SOLUTION INTRAMUSCULAR; INTRAVENOUS ONCE
Status: COMPLETED | OUTPATIENT
Start: 2019-10-11 | End: 2019-10-11

## 2019-10-11 RX ORDER — DIPHENHYDRAMINE HYDROCHLORIDE 50 MG/ML
25 INJECTION INTRAMUSCULAR; INTRAVENOUS ONCE
Status: COMPLETED | OUTPATIENT
Start: 2019-10-11 | End: 2019-10-11

## 2019-10-11 RX ORDER — 0.9 % SODIUM CHLORIDE 0.9 %
250 INTRAVENOUS SOLUTION INTRAVENOUS ONCE
Status: COMPLETED | OUTPATIENT
Start: 2019-10-11 | End: 2019-10-12

## 2019-10-11 RX ORDER — AMOXICILLIN 250 MG
2 CAPSULE ORAL 2 TIMES DAILY
Qty: 120 TABLET | Refills: 1 | Status: SHIPPED | OUTPATIENT
Start: 2019-10-11 | End: 2020-01-23 | Stop reason: SDUPTHER

## 2019-10-11 RX ORDER — ACETAMINOPHEN 325 MG/1
650 TABLET ORAL ONCE
Status: COMPLETED | OUTPATIENT
Start: 2019-10-11 | End: 2019-10-11

## 2019-10-11 RX ORDER — SENNOSIDES 8.6 MG
650 CAPSULE ORAL ONCE
Status: DISCONTINUED | OUTPATIENT
Start: 2019-10-11 | End: 2019-10-11 | Stop reason: CLARIF

## 2019-10-11 RX ADMIN — METHYLPREDNISOLONE SODIUM SUCCINATE 125 MG: 125 INJECTION, POWDER, FOR SOLUTION INTRAMUSCULAR; INTRAVENOUS at 12:21

## 2019-10-11 RX ADMIN — HEPARIN 300 UNITS: 100 SYRINGE at 16:50

## 2019-10-11 RX ADMIN — ACETAMINOPHEN 650 MG: 325 TABLET ORAL at 12:21

## 2019-10-11 RX ADMIN — DIPHENHYDRAMINE HYDROCHLORIDE: 50 INJECTION, SOLUTION INTRAMUSCULAR; INTRAVENOUS at 12:21

## 2019-10-11 RX ADMIN — Medication 20 ML: at 16:50

## 2019-10-11 RX ADMIN — SODIUM CHLORIDE 500 ML: 9 INJECTION, SOLUTION INTRAVENOUS at 12:00

## 2019-10-11 RX ADMIN — FUROSEMIDE 20 MG: 10 INJECTION, SOLUTION INTRAMUSCULAR; INTRAVENOUS at 15:03

## 2019-10-11 ASSESSMENT — ENCOUNTER SYMPTOMS
TROUBLE SWALLOWING: 0
COLOR CHANGE: 0
EYE ITCHING: 0
BACK PAIN: 0
ABDOMINAL PAIN: 0
CONSTIPATION: 1
SHORTNESS OF BREATH: 1
EYE DISCHARGE: 0
VOMITING: 0
VOICE CHANGE: 0
WHEEZING: 0
SORE THROAT: 0
NAUSEA: 0
ABDOMINAL DISTENTION: 0
BLOOD IN STOOL: 0
COUGH: 0
DIARRHEA: 0

## 2019-10-11 ASSESSMENT — PAIN SCALES - GENERAL: PAINLEVEL_OUTOF10: 0

## 2019-10-17 ENCOUNTER — HOSPITAL ENCOUNTER (OUTPATIENT)
Dept: CT IMAGING | Facility: HOSPITAL | Age: 79
Discharge: HOME | End: 2019-10-17
Admitting: PHYSICIAN ASSISTANT

## 2019-10-17 DIAGNOSIS — C34.11 MALIGNANT NEOPLASM OF UPPER LOBE OF RIGHT LUNG (HCC): ICD-10-CM

## 2019-10-17 PROCEDURE — 0 IOHEXOL 300 MG/ML SOLUTION: Performed by: PHYSICIAN ASSISTANT

## 2019-10-17 PROCEDURE — 74176 CT ABD & PELVIS W/O CONTRAST: CPT

## 2019-10-17 PROCEDURE — 71250 CT THORAX DX C-: CPT

## 2019-10-17 RX ADMIN — IOHEXOL 50 ML: 300 INJECTION, SOLUTION INTRAVENOUS at 09:25

## 2019-10-18 ENCOUNTER — HOSPITAL ENCOUNTER (OUTPATIENT)
Dept: INFUSION THERAPY | Age: 79
Discharge: HOME | End: 2019-10-18
Payer: MEDICARE

## 2019-10-18 DIAGNOSIS — C80.1 MALIGNANT NEOPLASM (HCC): ICD-10-CM

## 2019-10-18 DIAGNOSIS — C34.90 NON-SMALL CELL LUNG CANCER, UNSPECIFIED LATERALITY (HCC): ICD-10-CM

## 2019-10-18 DIAGNOSIS — C34.90 NON-SMALL CELL LUNG CANCER, UNSPECIFIED LATERALITY (HCC): Primary | ICD-10-CM

## 2019-10-18 DIAGNOSIS — N18.4 CHRONIC KIDNEY DISEASE, STAGE IV (SEVERE) (HCC): Primary | ICD-10-CM

## 2019-10-18 PROCEDURE — 96372 THER/PROPH/DIAG INJ SC/IM: CPT

## 2019-10-18 PROCEDURE — 6360000002 HC RX W HCPCS: Performed by: PHYSICIAN ASSISTANT

## 2019-10-18 PROCEDURE — 85025 COMPLETE CBC W/AUTO DIFF WBC: CPT

## 2019-10-23 RX ORDER — SODIUM CHLORIDE 0.9 % (FLUSH) 0.9 %
5 SYRINGE (ML) INJECTION PRN
Status: CANCELLED | OUTPATIENT
Start: 2019-10-24

## 2019-10-23 RX ORDER — HEPARIN SODIUM (PORCINE) LOCK FLUSH IV SOLN 100 UNIT/ML 100 UNIT/ML
500 SOLUTION INTRAVENOUS PRN
Status: CANCELLED | OUTPATIENT
Start: 2019-10-24

## 2019-10-23 RX ORDER — EPINEPHRINE 1 MG/ML
0.3 INJECTION, SOLUTION, CONCENTRATE INTRAVENOUS PRN
Status: CANCELLED | OUTPATIENT
Start: 2019-10-24

## 2019-10-23 RX ORDER — METHYLPREDNISOLONE SODIUM SUCCINATE 125 MG/2ML
125 INJECTION, POWDER, LYOPHILIZED, FOR SOLUTION INTRAMUSCULAR; INTRAVENOUS PRN
Status: CANCELLED | OUTPATIENT
Start: 2019-10-24

## 2019-10-23 RX ORDER — PALONOSETRON 0.05 MG/ML
0.25 INJECTION, SOLUTION INTRAVENOUS ONCE
Status: CANCELLED | OUTPATIENT
Start: 2019-10-24

## 2019-10-23 RX ORDER — DIPHENHYDRAMINE HYDROCHLORIDE 50 MG/ML
50 INJECTION INTRAMUSCULAR; INTRAVENOUS PRN
Status: CANCELLED | OUTPATIENT
Start: 2019-10-24

## 2019-10-23 RX ORDER — SODIUM CHLORIDE 0.9 % (FLUSH) 0.9 %
10 SYRINGE (ML) INJECTION PRN
Status: CANCELLED | OUTPATIENT
Start: 2019-10-24

## 2019-10-23 RX ORDER — SODIUM CHLORIDE 9 MG/ML
20 INJECTION, SOLUTION INTRAVENOUS ONCE
Status: CANCELLED | OUTPATIENT
Start: 2019-10-24

## 2019-10-24 ENCOUNTER — HOSPITAL ENCOUNTER (OUTPATIENT)
Dept: INFUSION THERAPY | Age: 79
Discharge: HOME | End: 2019-10-24
Payer: MEDICARE

## 2019-10-24 ENCOUNTER — TRANSCRIBE ORDERS (OUTPATIENT)
Dept: ADMINISTRATIVE | Facility: HOSPITAL | Age: 79
End: 2019-10-24

## 2019-10-24 ENCOUNTER — OFFICE VISIT (OUTPATIENT)
Dept: HEMATOLOGY | Age: 79
End: 2019-10-24
Payer: MEDICARE

## 2019-10-24 VITALS
DIASTOLIC BLOOD PRESSURE: 78 MMHG | WEIGHT: 165.3 LBS | BODY MASS INDEX: 26.68 KG/M2 | TEMPERATURE: 96.1 F | OXYGEN SATURATION: 98 % | HEART RATE: 74 BPM | RESPIRATION RATE: 14 BRPM | SYSTOLIC BLOOD PRESSURE: 162 MMHG

## 2019-10-24 DIAGNOSIS — N18.4 CHRONIC KIDNEY DISEASE, STAGE IV (SEVERE) (HCC): Primary | ICD-10-CM

## 2019-10-24 DIAGNOSIS — C34.90 NON-SMALL CELL LUNG CANCER, UNSPECIFIED LATERALITY (HCC): ICD-10-CM

## 2019-10-24 DIAGNOSIS — C34.11 MALIGNANT NEOPLASM OF UPPER LOBE, RIGHT BRONCHUS OR LUNG (HCC): Primary | ICD-10-CM

## 2019-10-24 DIAGNOSIS — D64.9 ANEMIA, UNSPECIFIED TYPE: ICD-10-CM

## 2019-10-24 DIAGNOSIS — C34.11 MALIGNANT NEOPLASM OF UPPER LOBE, RIGHT BRONCHUS OR LUNG (HCC): ICD-10-CM

## 2019-10-24 DIAGNOSIS — C34.11 MALIGNANT NEOPLASM OF UPPER LOBE OF RIGHT LUNG (HCC): Primary | ICD-10-CM

## 2019-10-24 DIAGNOSIS — C80.1 MALIGNANT NEOPLASM (HCC): ICD-10-CM

## 2019-10-24 PROCEDURE — G8484 FLU IMMUNIZE NO ADMIN: HCPCS | Performed by: INTERNAL MEDICINE

## 2019-10-24 PROCEDURE — G8598 ASA/ANTIPLAT THER USED: HCPCS | Performed by: INTERNAL MEDICINE

## 2019-10-24 PROCEDURE — 96374 THER/PROPH/DIAG INJ IV PUSH: CPT

## 2019-10-24 PROCEDURE — 85025 COMPLETE CBC W/AUTO DIFF WBC: CPT

## 2019-10-24 PROCEDURE — G8428 CUR MEDS NOT DOCUMENT: HCPCS | Performed by: INTERNAL MEDICINE

## 2019-10-24 PROCEDURE — 1123F ACP DISCUSS/DSCN MKR DOCD: CPT | Performed by: INTERNAL MEDICINE

## 2019-10-24 PROCEDURE — 96372 THER/PROPH/DIAG INJ SC/IM: CPT

## 2019-10-24 PROCEDURE — 96417 CHEMO IV INFUS EACH ADDL SEQ: CPT

## 2019-10-24 PROCEDURE — 99214 OFFICE O/P EST MOD 30 MIN: CPT | Performed by: INTERNAL MEDICINE

## 2019-10-24 PROCEDURE — 2580000003 HC RX 258: Performed by: INTERNAL MEDICINE

## 2019-10-24 PROCEDURE — 1036F TOBACCO NON-USER: CPT | Performed by: INTERNAL MEDICINE

## 2019-10-24 PROCEDURE — 96375 TX/PRO/DX INJ NEW DRUG ADDON: CPT

## 2019-10-24 PROCEDURE — 36591 DRAW BLOOD OFF VENOUS DEVICE: CPT

## 2019-10-24 PROCEDURE — 80053 COMPREHEN METABOLIC PANEL: CPT

## 2019-10-24 PROCEDURE — 6360000002 HC RX W HCPCS: Performed by: PHYSICIAN ASSISTANT

## 2019-10-24 PROCEDURE — 6360000002 HC RX W HCPCS: Performed by: INTERNAL MEDICINE

## 2019-10-24 PROCEDURE — 4040F PNEUMOC VAC/ADMIN/RCVD: CPT | Performed by: INTERNAL MEDICINE

## 2019-10-24 PROCEDURE — G8417 CALC BMI ABV UP PARAM F/U: HCPCS | Performed by: INTERNAL MEDICINE

## 2019-10-24 PROCEDURE — 96413 CHEMO IV INFUSION 1 HR: CPT

## 2019-10-24 PROCEDURE — 84443 ASSAY THYROID STIM HORMONE: CPT

## 2019-10-24 RX ORDER — DEXAMETHASONE SODIUM PHOSPHATE 10 MG/ML
10 INJECTION, SOLUTION INTRAMUSCULAR; INTRAVENOUS ONCE
Status: COMPLETED | OUTPATIENT
Start: 2019-10-24 | End: 2019-10-24

## 2019-10-24 RX ORDER — SODIUM CHLORIDE 0.9 % (FLUSH) 0.9 %
10 SYRINGE (ML) INJECTION PRN
Status: DISCONTINUED | OUTPATIENT
Start: 2019-10-24 | End: 2019-10-25 | Stop reason: HOSPADM

## 2019-10-24 RX ORDER — SODIUM CHLORIDE 0.9 % (FLUSH) 0.9 %
5 SYRINGE (ML) INJECTION PRN
Status: DISCONTINUED | OUTPATIENT
Start: 2019-10-24 | End: 2019-10-25 | Stop reason: HOSPADM

## 2019-10-24 RX ORDER — PALONOSETRON 0.05 MG/ML
0.25 INJECTION, SOLUTION INTRAVENOUS ONCE
Status: COMPLETED | OUTPATIENT
Start: 2019-10-24 | End: 2019-10-24

## 2019-10-24 RX ORDER — HEPARIN SODIUM (PORCINE) LOCK FLUSH IV SOLN 100 UNIT/ML 100 UNIT/ML
500 SOLUTION INTRAVENOUS PRN
Status: DISCONTINUED | OUTPATIENT
Start: 2019-10-24 | End: 2019-10-25 | Stop reason: HOSPADM

## 2019-10-24 RX ADMIN — SODIUM CHLORIDE 520 MG: 9 INJECTION, SOLUTION INTRAVENOUS at 15:22

## 2019-10-24 RX ADMIN — PALONOSETRON 0.25 MG: 0.05 INJECTION, SOLUTION INTRAVENOUS at 14:00

## 2019-10-24 RX ADMIN — EPOETIN ALFA-EPBX 20000 UNITS: 10000 INJECTION, SOLUTION INTRAVENOUS; SUBCUTANEOUS at 14:02

## 2019-10-24 RX ADMIN — Medication 500 UNITS: at 15:36

## 2019-10-24 RX ADMIN — Medication 20 ML: at 15:36

## 2019-10-24 RX ADMIN — SODIUM CHLORIDE 200 MG: 9 INJECTION, SOLUTION INTRAVENOUS at 14:54

## 2019-10-24 RX ADMIN — DEXAMETHASONE SODIUM PHOSPHATE 10 MG: 10 INJECTION, SOLUTION INTRAMUSCULAR; INTRAVENOUS at 14:00

## 2019-10-24 ASSESSMENT — PAIN SCALES - GENERAL: PAINLEVEL_OUTOF10: 0

## 2019-10-31 ENCOUNTER — HOSPITAL ENCOUNTER (OUTPATIENT)
Dept: INFUSION THERAPY | Age: 79
Discharge: HOME | End: 2019-10-31
Payer: MEDICARE

## 2019-10-31 VITALS
DIASTOLIC BLOOD PRESSURE: 64 MMHG | HEIGHT: 66 IN | BODY MASS INDEX: 26.21 KG/M2 | WEIGHT: 163.1 LBS | HEART RATE: 96 BPM | OXYGEN SATURATION: 91 % | SYSTOLIC BLOOD PRESSURE: 100 MMHG

## 2019-10-31 DIAGNOSIS — C34.11 MALIGNANT NEOPLASM OF UPPER LOBE, RIGHT BRONCHUS OR LUNG (HCC): ICD-10-CM

## 2019-10-31 DIAGNOSIS — C80.1 MALIGNANT NEOPLASM (HCC): ICD-10-CM

## 2019-10-31 DIAGNOSIS — N18.4 CHRONIC KIDNEY DISEASE, STAGE IV (SEVERE) (HCC): Primary | ICD-10-CM

## 2019-10-31 DIAGNOSIS — C34.11 MALIGNANT NEOPLASM OF UPPER LOBE, RIGHT BRONCHUS OR LUNG (HCC): Primary | ICD-10-CM

## 2019-10-31 PROCEDURE — 6360000002 HC RX W HCPCS: Performed by: PHYSICIAN ASSISTANT

## 2019-10-31 PROCEDURE — 85025 COMPLETE CBC W/AUTO DIFF WBC: CPT

## 2019-10-31 PROCEDURE — 96372 THER/PROPH/DIAG INJ SC/IM: CPT

## 2019-10-31 RX ADMIN — EPOETIN ALFA-EPBX 20000 UNITS: 10000 INJECTION, SOLUTION INTRAVENOUS; SUBCUTANEOUS at 15:36

## 2019-11-07 ENCOUNTER — HOSPITAL ENCOUNTER (OUTPATIENT)
Dept: INFUSION THERAPY | Age: 79
Discharge: HOME | End: 2019-11-07
Payer: MEDICARE

## 2019-11-07 VITALS
OXYGEN SATURATION: 92 % | WEIGHT: 161 LBS | BODY MASS INDEX: 25.88 KG/M2 | DIASTOLIC BLOOD PRESSURE: 66 MMHG | SYSTOLIC BLOOD PRESSURE: 130 MMHG | HEART RATE: 94 BPM | HEIGHT: 66 IN

## 2019-11-07 DIAGNOSIS — C80.1 MALIGNANT NEOPLASM (HCC): ICD-10-CM

## 2019-11-07 DIAGNOSIS — C34.90 NON-SMALL CELL LUNG CANCER, UNSPECIFIED LATERALITY (HCC): ICD-10-CM

## 2019-11-07 DIAGNOSIS — C34.90 NON-SMALL CELL LUNG CANCER, UNSPECIFIED LATERALITY (HCC): Primary | ICD-10-CM

## 2019-11-07 DIAGNOSIS — N18.4 CHRONIC KIDNEY DISEASE, STAGE IV (SEVERE) (HCC): Primary | ICD-10-CM

## 2019-11-07 PROCEDURE — 85025 COMPLETE CBC W/AUTO DIFF WBC: CPT

## 2019-11-07 PROCEDURE — 96372 THER/PROPH/DIAG INJ SC/IM: CPT

## 2019-11-07 PROCEDURE — 6360000002 HC RX W HCPCS: Performed by: PHYSICIAN ASSISTANT

## 2019-11-07 RX ADMIN — EPOETIN ALFA-EPBX 20000 UNITS: 10000 INJECTION, SOLUTION INTRAVENOUS; SUBCUTANEOUS at 16:02

## 2019-11-13 RX ORDER — HEPARIN SODIUM (PORCINE) LOCK FLUSH IV SOLN 100 UNIT/ML 100 UNIT/ML
500 SOLUTION INTRAVENOUS PRN
Status: CANCELLED | OUTPATIENT
Start: 2019-11-14

## 2019-11-13 RX ORDER — SODIUM CHLORIDE 9 MG/ML
20 INJECTION, SOLUTION INTRAVENOUS ONCE
Status: CANCELLED | OUTPATIENT
Start: 2019-11-14

## 2019-11-13 RX ORDER — SODIUM CHLORIDE 0.9 % (FLUSH) 0.9 %
10 SYRINGE (ML) INJECTION PRN
Status: CANCELLED | OUTPATIENT
Start: 2019-11-14

## 2019-11-13 RX ORDER — SODIUM CHLORIDE 0.9 % (FLUSH) 0.9 %
5 SYRINGE (ML) INJECTION PRN
Status: CANCELLED | OUTPATIENT
Start: 2019-11-14

## 2019-11-13 RX ORDER — METHYLPREDNISOLONE SODIUM SUCCINATE 125 MG/2ML
125 INJECTION, POWDER, LYOPHILIZED, FOR SOLUTION INTRAMUSCULAR; INTRAVENOUS PRN
Status: CANCELLED | OUTPATIENT
Start: 2019-11-14

## 2019-11-13 RX ORDER — EPINEPHRINE 1 MG/ML
0.3 INJECTION, SOLUTION, CONCENTRATE INTRAVENOUS PRN
Status: CANCELLED | OUTPATIENT
Start: 2019-11-14

## 2019-11-13 RX ORDER — DIPHENHYDRAMINE HYDROCHLORIDE 50 MG/ML
50 INJECTION INTRAMUSCULAR; INTRAVENOUS PRN
Status: CANCELLED | OUTPATIENT
Start: 2019-11-14

## 2019-11-13 RX ORDER — PALONOSETRON 0.05 MG/ML
0.25 INJECTION, SOLUTION INTRAVENOUS ONCE
Status: CANCELLED | OUTPATIENT
Start: 2019-11-14

## 2019-11-14 ENCOUNTER — HOSPITAL ENCOUNTER (OUTPATIENT)
Dept: INFUSION THERAPY | Age: 79
Discharge: HOME | End: 2019-11-14
Payer: MEDICARE

## 2019-11-14 VITALS
TEMPERATURE: 96.3 F | DIASTOLIC BLOOD PRESSURE: 74 MMHG | BODY MASS INDEX: 26.68 KG/M2 | HEIGHT: 66 IN | OXYGEN SATURATION: 92 % | SYSTOLIC BLOOD PRESSURE: 134 MMHG | HEART RATE: 84 BPM | WEIGHT: 166 LBS

## 2019-11-14 DIAGNOSIS — C34.90 NON-SMALL CELL LUNG CANCER, UNSPECIFIED LATERALITY (HCC): Primary | ICD-10-CM

## 2019-11-14 DIAGNOSIS — N18.4 CHRONIC KIDNEY DISEASE, STAGE IV (SEVERE) (HCC): ICD-10-CM

## 2019-11-14 DIAGNOSIS — C34.11 MALIGNANT NEOPLASM OF UPPER LOBE, RIGHT BRONCHUS OR LUNG (HCC): Primary | ICD-10-CM

## 2019-11-14 DIAGNOSIS — C80.1 MALIGNANT NEOPLASM (HCC): ICD-10-CM

## 2019-11-14 DIAGNOSIS — R53.83 OTHER FATIGUE: ICD-10-CM

## 2019-11-14 DIAGNOSIS — C34.90 NON-SMALL CELL LUNG CANCER, UNSPECIFIED LATERALITY (HCC): ICD-10-CM

## 2019-11-14 PROCEDURE — 84443 ASSAY THYROID STIM HORMONE: CPT

## 2019-11-14 PROCEDURE — 96413 CHEMO IV INFUSION 1 HR: CPT

## 2019-11-14 PROCEDURE — 6360000002 HC RX W HCPCS: Performed by: PHYSICIAN ASSISTANT

## 2019-11-14 PROCEDURE — 85025 COMPLETE CBC W/AUTO DIFF WBC: CPT

## 2019-11-14 PROCEDURE — 96375 TX/PRO/DX INJ NEW DRUG ADDON: CPT

## 2019-11-14 PROCEDURE — 96417 CHEMO IV INFUS EACH ADDL SEQ: CPT

## 2019-11-14 PROCEDURE — 96372 THER/PROPH/DIAG INJ SC/IM: CPT

## 2019-11-14 PROCEDURE — 80053 COMPREHEN METABOLIC PANEL: CPT

## 2019-11-14 PROCEDURE — 2580000003 HC RX 258: Performed by: INTERNAL MEDICINE

## 2019-11-14 PROCEDURE — 6360000002 HC RX W HCPCS: Performed by: INTERNAL MEDICINE

## 2019-11-14 RX ORDER — PALONOSETRON 0.05 MG/ML
0.25 INJECTION, SOLUTION INTRAVENOUS ONCE
Status: COMPLETED | OUTPATIENT
Start: 2019-11-14 | End: 2019-11-14

## 2019-11-14 RX ORDER — SODIUM CHLORIDE 0.9 % (FLUSH) 0.9 %
10 SYRINGE (ML) INJECTION PRN
Status: DISCONTINUED | OUTPATIENT
Start: 2019-11-14 | End: 2019-11-15 | Stop reason: HOSPADM

## 2019-11-14 RX ORDER — DEXAMETHASONE SODIUM PHOSPHATE 10 MG/ML
10 INJECTION, SOLUTION INTRAMUSCULAR; INTRAVENOUS ONCE
Status: COMPLETED | OUTPATIENT
Start: 2019-11-14 | End: 2019-11-14

## 2019-11-14 RX ADMIN — DEXAMETHASONE SODIUM PHOSPHATE 10 MG: 10 INJECTION, SOLUTION INTRAMUSCULAR; INTRAVENOUS at 10:16

## 2019-11-14 RX ADMIN — EPOETIN ALFA-EPBX 20000 UNITS: 10000 INJECTION, SOLUTION INTRAVENOUS; SUBCUTANEOUS at 11:07

## 2019-11-14 RX ADMIN — HEPARIN 500 UNITS: 100 SYRINGE at 11:53

## 2019-11-14 RX ADMIN — SODIUM CHLORIDE 200 MG: 9 INJECTION, SOLUTION INTRAVENOUS at 10:35

## 2019-11-14 RX ADMIN — Medication 10 ML: at 11:53

## 2019-11-14 RX ADMIN — PALONOSETRON 0.25 MG: 0.05 INJECTION, SOLUTION INTRAVENOUS at 10:11

## 2019-11-14 RX ADMIN — SODIUM CHLORIDE 520 MG: 9 INJECTION, SOLUTION INTRAVENOUS at 11:07

## 2019-11-14 ASSESSMENT — PAIN SCALES - GENERAL: PAINLEVEL_OUTOF10: 0

## 2019-11-21 ENCOUNTER — HOSPITAL ENCOUNTER (OUTPATIENT)
Dept: INFUSION THERAPY | Age: 79
Discharge: HOME | End: 2019-11-21
Payer: MEDICARE

## 2019-11-21 VITALS
OXYGEN SATURATION: 89 % | SYSTOLIC BLOOD PRESSURE: 118 MMHG | BODY MASS INDEX: 26.1 KG/M2 | HEIGHT: 66 IN | HEART RATE: 67 BPM | WEIGHT: 162.4 LBS | DIASTOLIC BLOOD PRESSURE: 58 MMHG

## 2019-11-21 DIAGNOSIS — C80.1 MALIGNANT NEOPLASM (HCC): ICD-10-CM

## 2019-11-21 DIAGNOSIS — N18.4 CHRONIC KIDNEY DISEASE, STAGE IV (SEVERE) (HCC): Primary | ICD-10-CM

## 2019-11-21 DIAGNOSIS — C34.11 MALIGNANT NEOPLASM OF UPPER LOBE, RIGHT BRONCHUS OR LUNG (HCC): ICD-10-CM

## 2019-11-21 PROCEDURE — 85025 COMPLETE CBC W/AUTO DIFF WBC: CPT

## 2019-11-21 PROCEDURE — 96372 THER/PROPH/DIAG INJ SC/IM: CPT

## 2019-11-21 PROCEDURE — 6360000002 HC RX W HCPCS: Performed by: PHYSICIAN ASSISTANT

## 2019-11-21 RX ADMIN — EPOETIN ALFA-EPBX 20000 UNITS: 10000 INJECTION, SOLUTION INTRAVENOUS; SUBCUTANEOUS at 15:49

## 2019-11-27 ENCOUNTER — HOSPITAL ENCOUNTER (OUTPATIENT)
Dept: INFUSION THERAPY | Age: 79
Discharge: HOME | End: 2019-11-27
Payer: MEDICARE

## 2019-11-27 VITALS
HEIGHT: 66 IN | SYSTOLIC BLOOD PRESSURE: 112 MMHG | DIASTOLIC BLOOD PRESSURE: 64 MMHG | OXYGEN SATURATION: 91 % | BODY MASS INDEX: 26 KG/M2 | WEIGHT: 161.8 LBS | HEART RATE: 68 BPM

## 2019-11-27 DIAGNOSIS — C34.90 NON-SMALL CELL LUNG CANCER, UNSPECIFIED LATERALITY (HCC): ICD-10-CM

## 2019-11-27 DIAGNOSIS — N18.4 CHRONIC KIDNEY DISEASE, STAGE IV (SEVERE) (HCC): Primary | ICD-10-CM

## 2019-11-27 DIAGNOSIS — C34.90 NON-SMALL CELL LUNG CANCER, UNSPECIFIED LATERALITY (HCC): Primary | ICD-10-CM

## 2019-11-27 DIAGNOSIS — C80.1 MALIGNANT NEOPLASM (HCC): ICD-10-CM

## 2019-11-27 PROCEDURE — 85025 COMPLETE CBC W/AUTO DIFF WBC: CPT

## 2019-11-27 PROCEDURE — 6360000002 HC RX W HCPCS: Performed by: PHYSICIAN ASSISTANT

## 2019-11-27 PROCEDURE — 96372 THER/PROPH/DIAG INJ SC/IM: CPT

## 2019-11-27 RX ADMIN — EPOETIN ALFA-EPBX 20000 UNITS: 10000 INJECTION, SOLUTION INTRAVENOUS; SUBCUTANEOUS at 15:08

## 2019-12-05 ENCOUNTER — HOSPITAL ENCOUNTER (OUTPATIENT)
Dept: INFUSION THERAPY | Age: 79
Discharge: HOME | End: 2019-12-05
Payer: MEDICARE

## 2019-12-05 VITALS
HEIGHT: 66 IN | TEMPERATURE: 97.6 F | DIASTOLIC BLOOD PRESSURE: 94 MMHG | SYSTOLIC BLOOD PRESSURE: 170 MMHG | OXYGEN SATURATION: 98 % | BODY MASS INDEX: 26.36 KG/M2 | HEART RATE: 76 BPM | WEIGHT: 164 LBS

## 2019-12-05 DIAGNOSIS — C80.1 MALIGNANT NEOPLASM (HCC): ICD-10-CM

## 2019-12-05 DIAGNOSIS — C34.11 MALIGNANT NEOPLASM OF UPPER LOBE, RIGHT BRONCHUS OR LUNG (HCC): Primary | ICD-10-CM

## 2019-12-05 DIAGNOSIS — N18.4 CHRONIC KIDNEY DISEASE, STAGE IV (SEVERE) (HCC): ICD-10-CM

## 2019-12-05 DIAGNOSIS — C34.90 NON-SMALL CELL LUNG CANCER, UNSPECIFIED LATERALITY (HCC): ICD-10-CM

## 2019-12-05 PROCEDURE — 96372 THER/PROPH/DIAG INJ SC/IM: CPT

## 2019-12-05 PROCEDURE — 6360000002 HC RX W HCPCS: Performed by: INTERNAL MEDICINE

## 2019-12-05 PROCEDURE — 6360000002 HC RX W HCPCS: Performed by: PHYSICIAN ASSISTANT

## 2019-12-05 PROCEDURE — 6360000002 HC RX W HCPCS: Performed by: NURSE PRACTITIONER

## 2019-12-05 PROCEDURE — 85025 COMPLETE CBC W/AUTO DIFF WBC: CPT

## 2019-12-05 PROCEDURE — 96413 CHEMO IV INFUSION 1 HR: CPT

## 2019-12-05 PROCEDURE — 96375 TX/PRO/DX INJ NEW DRUG ADDON: CPT

## 2019-12-05 PROCEDURE — 2580000003 HC RX 258: Performed by: INTERNAL MEDICINE

## 2019-12-05 PROCEDURE — 96417 CHEMO IV INFUS EACH ADDL SEQ: CPT

## 2019-12-05 RX ORDER — SODIUM CHLORIDE 9 MG/ML
20 INJECTION, SOLUTION INTRAVENOUS ONCE
Status: CANCELLED | OUTPATIENT
Start: 2019-12-05

## 2019-12-05 RX ORDER — SODIUM CHLORIDE 0.9 % (FLUSH) 0.9 %
5 SYRINGE (ML) INJECTION PRN
Status: CANCELLED | OUTPATIENT
Start: 2019-12-05

## 2019-12-05 RX ORDER — METHYLPREDNISOLONE SODIUM SUCCINATE 125 MG/2ML
125 INJECTION, POWDER, LYOPHILIZED, FOR SOLUTION INTRAMUSCULAR; INTRAVENOUS PRN
Status: CANCELLED | OUTPATIENT
Start: 2019-12-05

## 2019-12-05 RX ORDER — CYANOCOBALAMIN 1000 UG/ML
1000 INJECTION INTRAMUSCULAR; SUBCUTANEOUS ONCE
Status: COMPLETED | OUTPATIENT
Start: 2019-12-05 | End: 2019-12-05

## 2019-12-05 RX ORDER — EPINEPHRINE 1 MG/ML
0.3 INJECTION, SOLUTION, CONCENTRATE INTRAVENOUS PRN
Status: CANCELLED | OUTPATIENT
Start: 2019-12-05

## 2019-12-05 RX ORDER — SODIUM CHLORIDE 0.9 % (FLUSH) 0.9 %
10 SYRINGE (ML) INJECTION PRN
Status: CANCELLED | OUTPATIENT
Start: 2019-12-05

## 2019-12-05 RX ORDER — DEXAMETHASONE SODIUM PHOSPHATE 10 MG/ML
10 INJECTION, SOLUTION INTRAMUSCULAR; INTRAVENOUS ONCE
Status: COMPLETED | OUTPATIENT
Start: 2019-12-05 | End: 2019-12-05

## 2019-12-05 RX ORDER — PALONOSETRON 0.05 MG/ML
0.25 INJECTION, SOLUTION INTRAVENOUS ONCE
Status: COMPLETED | OUTPATIENT
Start: 2019-12-05 | End: 2019-12-05

## 2019-12-05 RX ORDER — DIPHENHYDRAMINE HYDROCHLORIDE 50 MG/ML
50 INJECTION INTRAMUSCULAR; INTRAVENOUS PRN
Status: CANCELLED | OUTPATIENT
Start: 2019-12-05

## 2019-12-05 RX ORDER — PALONOSETRON 0.05 MG/ML
0.25 INJECTION, SOLUTION INTRAVENOUS ONCE
Status: CANCELLED | OUTPATIENT
Start: 2019-12-05

## 2019-12-05 RX ORDER — SODIUM CHLORIDE 0.9 % (FLUSH) 0.9 %
10 SYRINGE (ML) INJECTION PRN
Status: DISCONTINUED | OUTPATIENT
Start: 2019-12-05 | End: 2019-12-06 | Stop reason: HOSPADM

## 2019-12-05 RX ADMIN — PALONOSETRON HYDROCHLORIDE 0.25 MG: 0.25 INJECTION, SOLUTION INTRAVENOUS at 10:46

## 2019-12-05 RX ADMIN — EPOETIN ALFA-EPBX 20000 UNITS: 10000 INJECTION, SOLUTION INTRAVENOUS; SUBCUTANEOUS at 12:20

## 2019-12-05 RX ADMIN — SODIUM CHLORIDE 200 MG: 9 INJECTION, SOLUTION INTRAVENOUS at 11:36

## 2019-12-05 RX ADMIN — SODIUM CHLORIDE 520 MG: 9 INJECTION, SOLUTION INTRAVENOUS at 12:05

## 2019-12-05 RX ADMIN — HEPARIN 300 UNITS: 100 SYRINGE at 12:19

## 2019-12-05 RX ADMIN — DEXAMETHASONE SODIUM PHOSPHATE 10 MG: 10 INJECTION, SOLUTION INTRAMUSCULAR; INTRAVENOUS at 10:47

## 2019-12-05 RX ADMIN — CYANOCOBALAMIN 1000 MCG: 1000 INJECTION, SOLUTION INTRAMUSCULAR; SUBCUTANEOUS at 12:20

## 2019-12-05 RX ADMIN — Medication 10 ML: at 12:19

## 2019-12-05 ASSESSMENT — PAIN SCALES - GENERAL: PAINLEVEL_OUTOF10: 0

## 2019-12-12 ENCOUNTER — HOSPITAL ENCOUNTER (OUTPATIENT)
Dept: INFUSION THERAPY | Age: 79
Discharge: HOME | End: 2019-12-12
Payer: MEDICARE

## 2019-12-12 VITALS
BODY MASS INDEX: 26.47 KG/M2 | HEIGHT: 66 IN | DIASTOLIC BLOOD PRESSURE: 60 MMHG | HEART RATE: 105 BPM | OXYGEN SATURATION: 91 % | SYSTOLIC BLOOD PRESSURE: 120 MMHG

## 2019-12-12 DIAGNOSIS — C34.90 NON-SMALL CELL LUNG CANCER, UNSPECIFIED LATERALITY (HCC): Primary | ICD-10-CM

## 2019-12-12 DIAGNOSIS — C34.90 NON-SMALL CELL LUNG CANCER, UNSPECIFIED LATERALITY (HCC): ICD-10-CM

## 2019-12-12 DIAGNOSIS — N18.4 CHRONIC KIDNEY DISEASE, STAGE IV (SEVERE) (HCC): Primary | ICD-10-CM

## 2019-12-12 DIAGNOSIS — C80.1 MALIGNANT NEOPLASM (HCC): ICD-10-CM

## 2019-12-12 PROCEDURE — 96372 THER/PROPH/DIAG INJ SC/IM: CPT

## 2019-12-12 PROCEDURE — 85025 COMPLETE CBC W/AUTO DIFF WBC: CPT

## 2019-12-12 PROCEDURE — 6360000002 HC RX W HCPCS: Performed by: PHYSICIAN ASSISTANT

## 2019-12-12 RX ADMIN — EPOETIN ALFA-EPBX 20000 UNITS: 10000 INJECTION, SOLUTION INTRAVENOUS; SUBCUTANEOUS at 14:43

## 2019-12-19 ENCOUNTER — HOSPITAL ENCOUNTER (OUTPATIENT)
Dept: INFUSION THERAPY | Age: 79
Discharge: HOME | End: 2019-12-19
Payer: MEDICARE

## 2019-12-19 VITALS
OXYGEN SATURATION: 95 % | WEIGHT: 162.1 LBS | HEART RATE: 81 BPM | SYSTOLIC BLOOD PRESSURE: 100 MMHG | DIASTOLIC BLOOD PRESSURE: 62 MMHG | BODY MASS INDEX: 26.05 KG/M2 | HEIGHT: 66 IN

## 2019-12-19 DIAGNOSIS — C34.11 MALIGNANT NEOPLASM OF UPPER LOBE, RIGHT BRONCHUS OR LUNG (HCC): ICD-10-CM

## 2019-12-19 DIAGNOSIS — N18.4 CHRONIC KIDNEY DISEASE, STAGE IV (SEVERE) (HCC): Primary | ICD-10-CM

## 2019-12-19 DIAGNOSIS — C34.90 NON-SMALL CELL LUNG CANCER, UNSPECIFIED LATERALITY (HCC): Primary | ICD-10-CM

## 2019-12-19 DIAGNOSIS — D64.9 ANEMIA, UNSPECIFIED TYPE: ICD-10-CM

## 2019-12-19 DIAGNOSIS — C80.1 MALIGNANT NEOPLASM (HCC): ICD-10-CM

## 2019-12-19 DIAGNOSIS — C34.90 NON-SMALL CELL LUNG CANCER, UNSPECIFIED LATERALITY (HCC): ICD-10-CM

## 2019-12-19 PROCEDURE — 96372 THER/PROPH/DIAG INJ SC/IM: CPT

## 2019-12-19 PROCEDURE — 85025 COMPLETE CBC W/AUTO DIFF WBC: CPT

## 2019-12-19 PROCEDURE — 6360000002 HC RX W HCPCS: Performed by: PHYSICIAN ASSISTANT

## 2019-12-19 RX ADMIN — EPOETIN ALFA-EPBX 20000 UNITS: 10000 INJECTION, SOLUTION INTRAVENOUS; SUBCUTANEOUS at 15:15

## 2019-12-26 ENCOUNTER — HOSPITAL ENCOUNTER (OUTPATIENT)
Dept: INFUSION THERAPY | Age: 79
Discharge: HOME | End: 2019-12-26
Payer: MEDICARE

## 2019-12-26 VITALS
SYSTOLIC BLOOD PRESSURE: 148 MMHG | HEIGHT: 66 IN | DIASTOLIC BLOOD PRESSURE: 80 MMHG | TEMPERATURE: 96.1 F | OXYGEN SATURATION: 98 % | BODY MASS INDEX: 26.84 KG/M2 | WEIGHT: 167 LBS | HEART RATE: 67 BPM

## 2019-12-26 DIAGNOSIS — R53.83 OTHER FATIGUE: ICD-10-CM

## 2019-12-26 DIAGNOSIS — C34.90 NON-SMALL CELL LUNG CANCER, UNSPECIFIED LATERALITY (HCC): ICD-10-CM

## 2019-12-26 DIAGNOSIS — D64.9 ANEMIA, UNSPECIFIED TYPE: ICD-10-CM

## 2019-12-26 DIAGNOSIS — C34.11 MALIGNANT NEOPLASM OF UPPER LOBE, RIGHT BRONCHUS OR LUNG (HCC): Primary | ICD-10-CM

## 2019-12-26 DIAGNOSIS — C34.90 NON-SMALL CELL LUNG CANCER, UNSPECIFIED LATERALITY (HCC): Primary | ICD-10-CM

## 2019-12-26 DIAGNOSIS — N18.4 CHRONIC KIDNEY DISEASE, STAGE IV (SEVERE) (HCC): ICD-10-CM

## 2019-12-26 DIAGNOSIS — C80.1 MALIGNANT NEOPLASM (HCC): ICD-10-CM

## 2019-12-26 PROCEDURE — 96413 CHEMO IV INFUSION 1 HR: CPT

## 2019-12-26 PROCEDURE — 6360000002 HC RX W HCPCS: Performed by: PHYSICIAN ASSISTANT

## 2019-12-26 PROCEDURE — 96375 TX/PRO/DX INJ NEW DRUG ADDON: CPT

## 2019-12-26 PROCEDURE — 85025 COMPLETE CBC W/AUTO DIFF WBC: CPT

## 2019-12-26 PROCEDURE — 2580000003 HC RX 258: Performed by: INTERNAL MEDICINE

## 2019-12-26 PROCEDURE — 96417 CHEMO IV INFUS EACH ADDL SEQ: CPT

## 2019-12-26 PROCEDURE — 6360000002 HC RX W HCPCS: Performed by: INTERNAL MEDICINE

## 2019-12-26 PROCEDURE — 96372 THER/PROPH/DIAG INJ SC/IM: CPT

## 2019-12-26 RX ORDER — EPINEPHRINE 1 MG/ML
0.3 INJECTION, SOLUTION, CONCENTRATE INTRAVENOUS PRN
Status: CANCELLED | OUTPATIENT
Start: 2019-12-26

## 2019-12-26 RX ORDER — CYANOCOBALAMIN 1000 UG/ML
1000 INJECTION INTRAMUSCULAR; SUBCUTANEOUS ONCE
Status: CANCELLED | OUTPATIENT
Start: 2019-12-26

## 2019-12-26 RX ORDER — SODIUM CHLORIDE 9 MG/ML
20 INJECTION, SOLUTION INTRAVENOUS ONCE
Status: CANCELLED | OUTPATIENT
Start: 2019-12-26

## 2019-12-26 RX ORDER — METHYLPREDNISOLONE SODIUM SUCCINATE 125 MG/2ML
125 INJECTION, POWDER, LYOPHILIZED, FOR SOLUTION INTRAMUSCULAR; INTRAVENOUS PRN
Status: CANCELLED | OUTPATIENT
Start: 2019-12-26

## 2019-12-26 RX ORDER — SODIUM CHLORIDE 0.9 % (FLUSH) 0.9 %
10 SYRINGE (ML) INJECTION PRN
Status: DISCONTINUED | OUTPATIENT
Start: 2019-12-26 | End: 2019-12-27 | Stop reason: HOSPADM

## 2019-12-26 RX ORDER — PALONOSETRON 0.05 MG/ML
0.25 INJECTION, SOLUTION INTRAVENOUS ONCE
Status: CANCELLED | OUTPATIENT
Start: 2019-12-26

## 2019-12-26 RX ORDER — SODIUM CHLORIDE 0.9 % (FLUSH) 0.9 %
5 SYRINGE (ML) INJECTION PRN
Status: CANCELLED | OUTPATIENT
Start: 2019-12-26

## 2019-12-26 RX ORDER — PALONOSETRON 0.05 MG/ML
0.25 INJECTION, SOLUTION INTRAVENOUS ONCE
Status: COMPLETED | OUTPATIENT
Start: 2019-12-26 | End: 2019-12-26

## 2019-12-26 RX ORDER — DEXAMETHASONE SODIUM PHOSPHATE 10 MG/ML
10 INJECTION, SOLUTION INTRAMUSCULAR; INTRAVENOUS ONCE
Status: COMPLETED | OUTPATIENT
Start: 2019-12-26 | End: 2019-12-26

## 2019-12-26 RX ORDER — SODIUM CHLORIDE 0.9 % (FLUSH) 0.9 %
10 SYRINGE (ML) INJECTION PRN
Status: CANCELLED | OUTPATIENT
Start: 2019-12-26

## 2019-12-26 RX ORDER — CYANOCOBALAMIN 1000 UG/ML
1000 INJECTION INTRAMUSCULAR; SUBCUTANEOUS ONCE
Status: COMPLETED | OUTPATIENT
Start: 2019-12-26 | End: 2019-12-26

## 2019-12-26 RX ORDER — DIPHENHYDRAMINE HYDROCHLORIDE 50 MG/ML
50 INJECTION INTRAMUSCULAR; INTRAVENOUS PRN
Status: CANCELLED | OUTPATIENT
Start: 2019-12-26

## 2019-12-26 RX ADMIN — PALONOSETRON HYDROCHLORIDE 0.25 MG: 0.25 INJECTION, SOLUTION INTRAVENOUS at 10:28

## 2019-12-26 RX ADMIN — SODIUM CHLORIDE 200 MG: 9 INJECTION, SOLUTION INTRAVENOUS at 10:56

## 2019-12-26 RX ADMIN — Medication 10 ML: at 11:50

## 2019-12-26 RX ADMIN — CYANOCOBALAMIN 1000 MCG: 1000 INJECTION, SOLUTION INTRAMUSCULAR; SUBCUTANEOUS at 11:46

## 2019-12-26 RX ADMIN — HEPARIN 300 UNITS: 100 SYRINGE at 11:50

## 2019-12-26 RX ADMIN — DEXAMETHASONE SODIUM PHOSPHATE 10 MG: 10 INJECTION, SOLUTION INTRAMUSCULAR; INTRAVENOUS at 10:29

## 2019-12-26 RX ADMIN — EPOETIN ALFA-EPBX 20000 UNITS: 10000 INJECTION, SOLUTION INTRAVENOUS; SUBCUTANEOUS at 11:49

## 2019-12-26 RX ADMIN — SODIUM CHLORIDE 520 MG: 9 INJECTION, SOLUTION INTRAVENOUS at 11:32

## 2019-12-26 ASSESSMENT — PAIN SCALES - GENERAL: PAINLEVEL_OUTOF10: 0

## 2020-01-02 ENCOUNTER — HOSPITAL ENCOUNTER (OUTPATIENT)
Dept: INFUSION THERAPY | Age: 80
Discharge: HOME | End: 2020-01-02
Payer: MEDICARE

## 2020-01-02 VITALS
WEIGHT: 164.4 LBS | OXYGEN SATURATION: 94 % | HEART RATE: 74 BPM | HEIGHT: 66 IN | DIASTOLIC BLOOD PRESSURE: 64 MMHG | BODY MASS INDEX: 26.42 KG/M2 | SYSTOLIC BLOOD PRESSURE: 102 MMHG

## 2020-01-02 DIAGNOSIS — C34.11 MALIGNANT NEOPLASM OF UPPER LOBE, RIGHT BRONCHUS OR LUNG (HCC): ICD-10-CM

## 2020-01-02 DIAGNOSIS — N18.4 CHRONIC KIDNEY DISEASE, STAGE IV (SEVERE) (HCC): Primary | ICD-10-CM

## 2020-01-02 DIAGNOSIS — C80.1 MALIGNANT NEOPLASM (HCC): ICD-10-CM

## 2020-01-02 PROCEDURE — 85025 COMPLETE CBC W/AUTO DIFF WBC: CPT

## 2020-01-02 PROCEDURE — 6360000002 HC RX W HCPCS: Performed by: PHYSICIAN ASSISTANT

## 2020-01-02 PROCEDURE — 96372 THER/PROPH/DIAG INJ SC/IM: CPT

## 2020-01-02 RX ADMIN — EPOETIN ALFA-EPBX 20000 UNITS: 10000 INJECTION, SOLUTION INTRAVENOUS; SUBCUTANEOUS at 14:43

## 2020-01-09 ENCOUNTER — HOSPITAL ENCOUNTER (OUTPATIENT)
Dept: CT IMAGING | Facility: HOSPITAL | Age: 80
Discharge: HOME | End: 2020-01-09
Admitting: INTERNAL MEDICINE

## 2020-01-09 ENCOUNTER — HOSPITAL ENCOUNTER (OUTPATIENT)
Dept: INFUSION THERAPY | Age: 80
Discharge: HOME | End: 2020-01-09
Payer: MEDICARE

## 2020-01-09 VITALS
BODY MASS INDEX: 26.02 KG/M2 | HEIGHT: 66 IN | SYSTOLIC BLOOD PRESSURE: 108 MMHG | DIASTOLIC BLOOD PRESSURE: 62 MMHG | WEIGHT: 161.9 LBS | OXYGEN SATURATION: 90 % | HEART RATE: 80 BPM

## 2020-01-09 DIAGNOSIS — N18.4 CHRONIC KIDNEY DISEASE, STAGE IV (SEVERE) (HCC): Primary | ICD-10-CM

## 2020-01-09 DIAGNOSIS — D64.9 ANEMIA, UNSPECIFIED TYPE: ICD-10-CM

## 2020-01-09 DIAGNOSIS — C34.11 MALIGNANT NEOPLASM OF UPPER LOBE OF RIGHT LUNG (HCC): ICD-10-CM

## 2020-01-09 DIAGNOSIS — C34.90 NON-SMALL CELL LUNG CANCER, UNSPECIFIED LATERALITY (HCC): ICD-10-CM

## 2020-01-09 DIAGNOSIS — C80.1 MALIGNANT NEOPLASM (HCC): ICD-10-CM

## 2020-01-09 LAB — CREAT BLDA-MCNC: 2.1 MG/DL (ref 0.6–1.3)

## 2020-01-09 PROCEDURE — 85025 COMPLETE CBC W/AUTO DIFF WBC: CPT

## 2020-01-09 PROCEDURE — 25010000002 IOPAMIDOL 61 % SOLUTION: Performed by: INTERNAL MEDICINE

## 2020-01-09 PROCEDURE — 74176 CT ABD & PELVIS W/O CONTRAST: CPT

## 2020-01-09 PROCEDURE — 6360000002 HC RX W HCPCS: Performed by: PHYSICIAN ASSISTANT

## 2020-01-09 PROCEDURE — 82565 ASSAY OF CREATININE: CPT

## 2020-01-09 PROCEDURE — 96372 THER/PROPH/DIAG INJ SC/IM: CPT

## 2020-01-09 PROCEDURE — 71250 CT THORAX DX C-: CPT

## 2020-01-09 RX ORDER — FUROSEMIDE 10 MG/ML
20 INJECTION INTRAMUSCULAR; INTRAVENOUS ONCE
Status: CANCELLED | OUTPATIENT
Start: 2020-01-10

## 2020-01-09 RX ORDER — 0.9 % SODIUM CHLORIDE 0.9 %
250 INTRAVENOUS SOLUTION INTRAVENOUS ONCE
Status: CANCELLED | OUTPATIENT
Start: 2020-01-10

## 2020-01-09 RX ORDER — METHYLPREDNISOLONE SODIUM SUCCINATE 125 MG/2ML
125 INJECTION, POWDER, LYOPHILIZED, FOR SOLUTION INTRAMUSCULAR; INTRAVENOUS ONCE
Status: CANCELLED | OUTPATIENT
Start: 2020-01-10

## 2020-01-09 RX ORDER — EPINEPHRINE 1 MG/ML
0.3 INJECTION, SOLUTION, CONCENTRATE INTRAVENOUS PRN
Status: CANCELLED | OUTPATIENT
Start: 2020-01-10

## 2020-01-09 RX ORDER — DIPHENHYDRAMINE HYDROCHLORIDE 50 MG/ML
50 INJECTION INTRAMUSCULAR; INTRAVENOUS ONCE
Status: CANCELLED | OUTPATIENT
Start: 2020-01-10

## 2020-01-09 RX ORDER — ACETAMINOPHEN 325 MG/1
650 TABLET ORAL ONCE
Status: CANCELLED | OUTPATIENT
Start: 2020-01-10

## 2020-01-09 RX ORDER — DIPHENHYDRAMINE HCL 25 MG
25 TABLET ORAL ONCE
Status: CANCELLED | OUTPATIENT
Start: 2020-01-10

## 2020-01-09 RX ORDER — SODIUM CHLORIDE 9 MG/ML
INJECTION, SOLUTION INTRAVENOUS CONTINUOUS
Status: CANCELLED | OUTPATIENT
Start: 2020-01-10

## 2020-01-09 RX ORDER — SODIUM CHLORIDE 0.9 % (FLUSH) 0.9 %
20 SYRINGE (ML) INJECTION PRN
Status: CANCELLED | OUTPATIENT
Start: 2020-01-10

## 2020-01-09 RX ADMIN — IOPAMIDOL 50 ML: 612 INJECTION, SOLUTION INTRAVENOUS at 09:48

## 2020-01-09 RX ADMIN — EPOETIN ALFA-EPBX 20000 UNITS: 10000 INJECTION, SOLUTION INTRAVENOUS; SUBCUTANEOUS at 15:56

## 2020-01-10 ENCOUNTER — HOSPITAL ENCOUNTER (OUTPATIENT)
Dept: INFUSION THERAPY | Age: 80
Setting detail: INFUSION SERIES
Discharge: HOME | End: 2020-01-10
Payer: MEDICARE

## 2020-01-10 VITALS
OXYGEN SATURATION: 98 % | DIASTOLIC BLOOD PRESSURE: 82 MMHG | HEART RATE: 71 BPM | RESPIRATION RATE: 18 BRPM | SYSTOLIC BLOOD PRESSURE: 137 MMHG | TEMPERATURE: 98 F

## 2020-01-10 DIAGNOSIS — D64.9 SYMPTOMATIC ANEMIA: Primary | ICD-10-CM

## 2020-01-10 LAB
ABO/RH: NORMAL
ANTIBODY SCREEN: NORMAL
BLOOD BANK DISPENSE STATUS: NORMAL
BLOOD BANK PRODUCT CODE: NORMAL
BPU ID: NORMAL
DESCRIPTION BLOOD BANK: NORMAL

## 2020-01-10 PROCEDURE — 6360000002 HC RX W HCPCS: Performed by: PHYSICIAN ASSISTANT

## 2020-01-10 PROCEDURE — P9016 RBC LEUKOCYTES REDUCED: HCPCS

## 2020-01-10 PROCEDURE — 2580000003 HC RX 258: Performed by: PHYSICIAN ASSISTANT

## 2020-01-10 PROCEDURE — 86901 BLOOD TYPING SEROLOGIC RH(D): CPT

## 2020-01-10 PROCEDURE — 86923 COMPATIBILITY TEST ELECTRIC: CPT

## 2020-01-10 PROCEDURE — 36430 TRANSFUSION BLD/BLD COMPNT: CPT

## 2020-01-10 PROCEDURE — 96375 TX/PRO/DX INJ NEW DRUG ADDON: CPT

## 2020-01-10 PROCEDURE — 86900 BLOOD TYPING SEROLOGIC ABO: CPT

## 2020-01-10 PROCEDURE — 96374 THER/PROPH/DIAG INJ IV PUSH: CPT

## 2020-01-10 PROCEDURE — 6360000002 HC RX W HCPCS

## 2020-01-10 PROCEDURE — 6370000000 HC RX 637 (ALT 250 FOR IP): Performed by: PHYSICIAN ASSISTANT

## 2020-01-10 PROCEDURE — 86850 RBC ANTIBODY SCREEN: CPT

## 2020-01-10 RX ORDER — DIPHENHYDRAMINE HYDROCHLORIDE 50 MG/ML
50 INJECTION INTRAMUSCULAR; INTRAVENOUS ONCE
Status: CANCELLED | OUTPATIENT
Start: 2020-01-10

## 2020-01-10 RX ORDER — DIPHENHYDRAMINE HCL 25 MG
25 TABLET ORAL ONCE
Status: CANCELLED | OUTPATIENT
Start: 2020-01-10

## 2020-01-10 RX ORDER — FUROSEMIDE 10 MG/ML
20 INJECTION INTRAMUSCULAR; INTRAVENOUS ONCE
Status: CANCELLED | OUTPATIENT
Start: 2020-01-10

## 2020-01-10 RX ORDER — DIPHENHYDRAMINE HCL 25 MG
25 TABLET ORAL ONCE
Status: COMPLETED | OUTPATIENT
Start: 2020-01-10 | End: 2020-01-10

## 2020-01-10 RX ORDER — METHYLPREDNISOLONE SODIUM SUCCINATE 125 MG/2ML
125 INJECTION, POWDER, LYOPHILIZED, FOR SOLUTION INTRAMUSCULAR; INTRAVENOUS ONCE
Status: CANCELLED | OUTPATIENT
Start: 2020-01-10

## 2020-01-10 RX ORDER — SODIUM CHLORIDE 9 MG/ML
INJECTION, SOLUTION INTRAVENOUS CONTINUOUS
Status: CANCELLED | OUTPATIENT
Start: 2020-01-10

## 2020-01-10 RX ORDER — FUROSEMIDE 10 MG/ML
20 INJECTION INTRAMUSCULAR; INTRAVENOUS ONCE
Status: COMPLETED | OUTPATIENT
Start: 2020-01-10 | End: 2020-01-10

## 2020-01-10 RX ORDER — EPINEPHRINE 1 MG/ML
0.3 INJECTION, SOLUTION, CONCENTRATE INTRAVENOUS PRN
Status: CANCELLED | OUTPATIENT
Start: 2020-01-10

## 2020-01-10 RX ORDER — ACETAMINOPHEN 325 MG/1
650 TABLET ORAL ONCE
Status: COMPLETED | OUTPATIENT
Start: 2020-01-10 | End: 2020-01-10

## 2020-01-10 RX ORDER — ACETAMINOPHEN 325 MG/1
650 TABLET ORAL ONCE
Status: CANCELLED | OUTPATIENT
Start: 2020-01-10

## 2020-01-10 RX ORDER — SODIUM CHLORIDE 0.9 % (FLUSH) 0.9 %
20 SYRINGE (ML) INJECTION PRN
Status: CANCELLED | OUTPATIENT
Start: 2020-01-10

## 2020-01-10 RX ORDER — SODIUM CHLORIDE 0.9 % (FLUSH) 0.9 %
20 SYRINGE (ML) INJECTION PRN
Status: DISCONTINUED | OUTPATIENT
Start: 2020-01-10 | End: 2020-01-12 | Stop reason: HOSPADM

## 2020-01-10 RX ORDER — 0.9 % SODIUM CHLORIDE 0.9 %
250 INTRAVENOUS SOLUTION INTRAVENOUS ONCE
Status: COMPLETED | OUTPATIENT
Start: 2020-01-10 | End: 2020-01-10

## 2020-01-10 RX ORDER — 0.9 % SODIUM CHLORIDE 0.9 %
250 INTRAVENOUS SOLUTION INTRAVENOUS ONCE
Status: CANCELLED | OUTPATIENT
Start: 2020-01-10

## 2020-01-10 RX ADMIN — HEPARIN 300 UNITS: 100 SYRINGE at 12:29

## 2020-01-10 RX ADMIN — SODIUM CHLORIDE, PRESERVATIVE FREE 20 ML: 5 INJECTION INTRAVENOUS at 12:29

## 2020-01-10 RX ADMIN — DIPHENHYDRAMINE HCL 25 MG: 25 TABLET ORAL at 09:07

## 2020-01-10 RX ADMIN — SODIUM CHLORIDE 250 ML: 9 INJECTION, SOLUTION INTRAVENOUS at 09:07

## 2020-01-10 RX ADMIN — ACETAMINOPHEN 650 MG: 325 TABLET ORAL at 09:07

## 2020-01-10 RX ADMIN — HYDROCORTISONE SODIUM SUCCINATE 100 MG: 100 INJECTION, POWDER, FOR SOLUTION INTRAMUSCULAR; INTRAVENOUS at 09:07

## 2020-01-10 RX ADMIN — FUROSEMIDE 20 MG: 10 INJECTION, SOLUTION INTRAMUSCULAR; INTRAVENOUS at 12:29

## 2020-01-15 RX ORDER — METHYLPREDNISOLONE SODIUM SUCCINATE 125 MG/2ML
125 INJECTION, POWDER, LYOPHILIZED, FOR SOLUTION INTRAMUSCULAR; INTRAVENOUS PRN
Status: CANCELLED | OUTPATIENT
Start: 2020-01-23

## 2020-01-15 RX ORDER — SODIUM CHLORIDE 0.9 % (FLUSH) 0.9 %
10 SYRINGE (ML) INJECTION PRN
Status: CANCELLED | OUTPATIENT
Start: 2020-01-23

## 2020-01-15 RX ORDER — EPINEPHRINE 1 MG/ML
0.3 INJECTION, SOLUTION, CONCENTRATE INTRAVENOUS PRN
Status: CANCELLED | OUTPATIENT
Start: 2020-01-23

## 2020-01-15 RX ORDER — CYANOCOBALAMIN 1000 UG/ML
1000 INJECTION INTRAMUSCULAR; SUBCUTANEOUS ONCE
Status: CANCELLED | OUTPATIENT
Start: 2020-01-23

## 2020-01-15 RX ORDER — PALONOSETRON 0.05 MG/ML
0.25 INJECTION, SOLUTION INTRAVENOUS ONCE
Status: CANCELLED | OUTPATIENT
Start: 2020-01-23

## 2020-01-15 RX ORDER — SODIUM CHLORIDE 9 MG/ML
20 INJECTION, SOLUTION INTRAVENOUS ONCE
Status: CANCELLED | OUTPATIENT
Start: 2020-01-23

## 2020-01-15 RX ORDER — DIPHENHYDRAMINE HYDROCHLORIDE 50 MG/ML
50 INJECTION INTRAMUSCULAR; INTRAVENOUS PRN
Status: CANCELLED | OUTPATIENT
Start: 2020-01-23

## 2020-01-15 RX ORDER — SODIUM CHLORIDE 0.9 % (FLUSH) 0.9 %
5 SYRINGE (ML) INJECTION PRN
Status: CANCELLED | OUTPATIENT
Start: 2020-01-23

## 2020-01-15 NOTE — PROGRESS NOTES
Progress Note      Pt Name: Renee Favre: 1940  MRN: [id number]    Date of evaluation: 1/16/2020  History Obtained From:  patient, non-family caregiver - , electronic medical record    CHIEF COMPLAINT:    Chief Complaint   Patient presents with    Other     Non-small cell lung cancer     Current active problems  Non-small cell lung cancer  Anemia secondary to chronic renal failure    HISTORY OF PRESENT ILLNESS:    Herschell Mcburney is a 70 y.o.  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          He completed 4 cycles of combination chemotherapy with carboplatin, Natalya Hercules is now  receiving  maintenance therapy with Keytruda and Alimta every 3 weeks.      He is status post repeat imaging of the chest, abdomen, pelvis at [facility] on 1/9/2020 and is here to review those results.        TUMOR HISTORY: Adenocarcinoma on the RUL of the lung 11/27/2018  Sandip Valdez had CT scans performed for new onset of weight loss of 13 pounds over the previous year , associated with increased fatigue. He denied respiratory symptoms of shortness of air, cough or productive sputum.     A CT scan of the chest on 9/12/2018 had been ordered by Dr. Nataliia Frank due to weight loss and identified a new lobulated right upper lobe 5.7 cm mass that extended back to the right hilum.    Numerous mediastinal lymph nodes ranging in size from mm to 1.3 cm and a subcarnial lymph node that measured 1.5 x 1.5 x 3.5 cm.     A CT scan of the abdomen and pelvis with contrast on 9/12/2018 documented a 4.9 x 4 x 4 cm soft tissue mass with peripheral calcification immediately adjacent to the gallbladder in the head of the pancreas area.     An MRI of the abdomen and pelvis W & WO on 10/11/2018 identified in the 4.1 x 3.4 cm soft tissue mass in the subhepatic space, abutting the second portion of the duodenum with mild displacement of the duodenum. There does not appear to be involvement of the pancreas, and the pancreas appears within normal limits. Differential diagnoses include a desmoid tumor, less likely leiomyoma of the wall of the duodenum or malignancy. Dr. Dayo Rodriguez requested a CT-guided biopsy of the right upper lobe mass. Dr. Naaman Riedel, the radiologist, evaluated the area with a repeat CT scan of the chest on 10/11/2018. He spoke with Dr. Dayo Rodriguez indicating that he would not be able to perform the biopsy because the tumor was not accessible, it was under the scapula , which blocked access and therefore the biopsy procedure was canceled.     On the CT scan of the chest on 10/11/2018 measurements of the RUL lung mass was 5.7 x 3.2 cm with irregular margins suspicious for a primary lung neoplasm. Soft tissue associated with the tumor appeared to extend into the bronchus supplying this portion of the RUL of the lung. Dr. Naaman Riedel indicated that the mass had an endobronchial component and should be easily assessable via bronchoscopy.     The chest CT also included a portion of the upper abdomen where a soft tissue mass was described in an addendum report. In the subhepatic space measuring 4.0 x 3.9 cm, displacing the second portion of the duodenum medially. A referral was made to Dr. Cote for consideration for bronchoscopy for biopsy.     On 10/24/2018, Dr. Brianda Ocampo performed a bronchoscopy with EBUS guided biopsy of a 3 cm subcarinal lymph node along with bronchoalveolar lavage of the posterior segment of the RUL of the lung. The final pathology of the station 7 lymph node only revealed a background of small mature lymphocytes with clusters of histiocytes suggestive of granuloma. Negative for malignant cells. Kinyoun and GMS stains were negative for AFB and fungal organisms respectively. The bronchial washings were negative for malignant cells as well.      Mr. Jv Aponte was referred to Dr. Lyn Mohr at [facility] in [place], [place], for navigational bronchoscopy and biopsy under ultrasound.     On 11/27/2018 Dr. Lyn Mohr performed a bronchoscopy, navigational protocol, endobronchial ultrasound and biopsy of the RUL of the lung mass along with multiple lymph node station Biopsies.   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 · All lymph nodes sampled were NEGATIVE for malignant cells including stations 10L, 4L, station 7, 10R, 4R.   · IHC studies were positive for CK7, TTF-1 and Napsin A.   · IHC studies on tumor cells were negative for CDX2, CK5/6, and p63   · Further lung profile molecular testing is pending     All of the above was discussed at length with Mr. Qing Cid at his 12/13/2018 clinic visit. He was agreeable for referral for consideration of resection of the lung lesion.      He is comfortable with and would like a referral to Dr. Burak Rhodes ([phone]) at [place] [address], [address] [place] [address], [address] [place] [address]. Logistics, however, are planning a big part in his decision making and he may decide to have surgery done in the [place] area.     If he decides to have surgery in the [place], referral will be made to Dr. Clayton Kline.      CT chest with contrast 2/18/2019 at [facility] to 9/12/2018 revealed a 4.9 x 3.5 cm spiculated RUL lung mass which actually decreased in size from a prior value of 6.1 x 3.6 cm. Subhepatic mass adjacent to the descending duodenum had increased in size significantly to 4.3 x 9 cm compared to 4.1 x 3.4 cm on the 10/11/18 MRI of the abdomen.     CT of the abdomen and pelvis without contrast on 3/20/2019 at [facility] was compared to outpatient CT data and pelvis on 9/12/2018 an MRI of the abdomen from 10/11/2018.  A soft tissue mass was again encountered in the subhepatic space which abutted the distal stomach and proximal duodenum measuring 8.9 x 5.4 cm which has increased considerably from a prior measurement of 4.1 x 3.4 cm. Medial to the left renal hilum a 3.5 cm lobular mass causing some mild left-sided hydronephrosis.     Mr. Esthela Bose was referred to Dr. Adrian Quintana who saw him on 1/22/2019 anticipating plans for a curative resection.      Dr. Muir Has received a phone call on 2/20/2019 from Dr. Joseline Conrad , indicating that the previously documented an abdominal pancreatic area mass had doubled in size and was causing compression into the left kidney/renal pelvis producing a hydroureter.      Dr. Joseline Conrad arranged a referral to Dr. Nicole Lowe who will be placing a stent 3/8/2019.     Mr. Esthela Bose was scheduled to be seen by gastroenterology at [facility] on 3/13/2019 for EGD/EUS assessment and biopsy of the enlarging peripancreatic/duodenal area mass. With a decrease in the lung mass and increased size of the subhepatic mass-surgical resection was abandoned at present pending further evaluation of the subhepatic mass. Dr. Muir Has discussed treatment options with the unresectable lung mass and the per he pancreatic mass and recommended a combination of Keytruda, Alimta, carboplatin. He was subsequently admitted to [facility] 4/26 - 4/30/2019 with abdominal pain and melena. WBC fantasma was 0.75 with ANC 0.34. PLT did drop to 24,000. He did have duodenal ulcerations that were bleeding on endoscopy. He was stabilized but then readmitted from 5/8 - 5/10/2019 with recurrent melanoma. A repeat endoscopy was performed. It was felt that exacerbation of his bleeding from the duodenal came about by his thrombocytopenia from treatment. Subsequent dosing was adjusted and Neulasta was added.     CT chest without contrast at [facility] on 6/27/2019 was compared to 2/18/2019 revealed that the right lung mass that extended into the right hilum has decreased from 5.2 x 3.5 down to 4.6 x 3.6.  There is increased central cavitation in the mass consistent with tumor necrosis. Mediastinal lymphadenopathy is relatively stable.     CT abdomen and pelvis without contrast at [facility] on 6/27/2019 was compared to 4/26/2019 revealed complete resolution of the previously identified. Duodenal/subhepatic space soft tissue mass. The previously identified heterogenous enhancing lesion in the left renal pelvis was much less prominent but there does continue to be some mild left caliectasis. I reviewed the CT results with Dr. Trinity Edwards on 7/5/2019 who then relayed them as well to [facility]. We've had excellent results from this combination of therapy. He will complete the fourth combination therapy of Keytruda, carbo, Alimta   and then go to maintenance Keytruda plus Alimta. CT chest without contrast at [facility] on 1/9/2020 was compared to 10/17/2019 revealing a stable spiculated RUL nodule/mass with similar mediastinal adenopathy. Questionable right hilar adenopathy with diminished assessment due to lack of IV contrast.  Advanced emphysema with severe pulmonary fibrosis. No new pulmonary nodules. CT abdomen and pelvis without contrast at [facility] on 1/9/2020 was compared to 10/17/2019 revealing mildly prominent aortocaval RP lymph nodes with position just below the level of the renal vein worrisome for potential lymphatic metastases. This is similar to 10/17/2019 but increased from 4/26/2019. Pneumobilia without discrete suspicious focal lesion in the liver. Wall thickening of the duodenum. Similar and stable renal lesions favoring cysts. He has had numerous endoscopies within the past year. About 10 months ago he was having an Endo EUS and had cardiac issues and was actually admitted to the intensive care unit.   This area will just be monitored on follow-up scans.       TREATMENT SUMMARY  1. Keytruda, carboplatin, Alimta initiated 4/18/2019 to be given every 3 weeks for 4 cycles - 4th cycle 7/5/2019, then Keytruda + Alimta as 6/27/2019 was compared to 4/26/2019 revealed complete resolution of the previously identified. Duodenal/subhepatic space soft tissue mass. The previously identified heterogenous enhancing lesion in the left renal pelvis was much less prominent but there does continue to be some mild left caliectasis.       #1 HEMATOLOGICAL HISTORY: IgG kappa MGUS- Dx: 02/23/06. During the course of Mr. Douglas Walter follow up, an abnormally elevated protein was noted on one occasion, which led to workup including quantitative immunoglobulins.      An elevated IgG kappa was encountered. This has remained stable in the 2500 range since early 2006.      A bone marrow biopsy and aspirate on 02/23/06 was negative with only 4% plasma cells.      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      24 hour urine for immunoelectrophoresis did not reveal an M-spike. Serology studies on 9/18/2018 documented an elevated, stable IgG of 2589 with an M spike of 0.7. Immunofixation continued to reveal IgG monoclonal protein with kappa light chain specificity.     #2 HEMATOLOGICAL HISTORY: Iron deficiency anemia, 9/18/2018  Dasha Collins had serology on 9/18/2018 that identified iron deficiency anemia. His lab work was obtained at [facility].  An iron level was 12, iron saturation 7%, ferritin 256  Folate >20, and vitamin B12 1044. Dr. Jaden Morrell placed Sophia on ferrous sulfate 325 mg daily on 9/25/2018 , but this failed to resolve the anemia.     An EGD by Dr. Radha Bradley on 12/11/2018 documented a normal esophagus, normal stomach and a degree of duodenal deformity. Gastric biopsy was urease negative.     Colonoscopy by Dr. Radha Bradley on 1/9/2019 documented a polyp at 80 cm. Pathology identified a tubular adenoma, negative for high-grade dysplasia.    Feraheme administered.     Labs on 3/13/2019:  · Iron 25   · Iron saturation 22%   · TIBC 116   · Ferritin >2000   · Reticulocyte count 0.0578   ·    · Haptoglobin 341   · Folate >20 · Vitamin B12 945   · Erythropoietin 13     He presented to [facility] on 4/26/2019 with melena and abdominal discomfort. He was subsequently admitted     EGD per Dr. Dagmar Campa on 4/29/2019 revealed  · LA grade (1 or more mucosal breaks greater than 5 mm, not extending between the tops of the 2 mucosal folds)? esophagitis with no bleeding found in the distal esophagus   · Stomach was normal, biopsies for H. pylori taken. · Cardia and gastric fundus were normal on retroflexion   · One nonbleeding cratered duodenal ulcer with no stigmata of bleeding was found in the duodenal bulb lesion was about 9 mm. · Many nonbleeding cratered, linear and superficial duodenal ulcers with no stigmata of bleeding were found in the second portion of the duodenum. The largest lesion was 6 mm in largest dimension. No mass encountered and anastomosis not seen.     He was admitted to [facility] on 5/8/2019 with melena, hematochezia, anemia, thrombocytopenia     Endoscopy performed by Dr. Siddharth Phelps on 5/9/2019 revealed  · Normal esophagus   · Gastric mucosal variant. 2 erythematous spots in the antrum, ? AVM   · Normal examined duodenum   · Nothing found to account for symptoms   He developed pancytopenia from chemotherapy and did require transfusions. His hemoglobin dropped to 7.3 on 6/25/2019 after his last treatment. He received 2 units packed red blood cells as an outpatient.     Serology 6/13/2019  Serum Fe - 117  TIBC - 587  Fe sat - 19.9%  Ferritin - 1,000  Iron levels have been adequately replaced. 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     TREATMENT SUMMARY  1. Feraheme 510 mg IV on 2/14 and 2/21/19   2.  Venofer 200 mg IV daily 5/8 - 5/10/2019 as IP at [facility]   3. s/p 2 units pRBC on 4/26/19 and 2 units pRBC on 4/29/2019 as IP at [facility]?  s/p 2 units pRBC on?5/8/2019   s/p 2 pheresis plts on 5/8/2019  s/p 2 units pRBC as OP 6/26/2019      Past cough and wheezing. Cardiovascular: Negative for chest pain, palpitations and leg swelling. Gastrointestinal: Positive for constipation. Negative for abdominal distention, abdominal pain, blood in stool, diarrhea, nausea and vomiting. Endocrine: Negative for cold intolerance, heat intolerance, polydipsia and polyuria. Genitourinary: Negative for difficulty urinating, dysuria, hematuria and urgency. Musculoskeletal: Negative for arthralgias, back pain, joint swelling and myalgias. Skin: Negative for color change and rash. Neurological: Positive for light-headedness. Negative for dizziness, tremors, seizures and syncope. Hematological: Negative for adenopathy. Does not bruise/bleed easily. Psychiatric/Behavioral: Negative for behavioral problems and suicidal ideas. The patient is not nervous/anxious. All other systems reviewed and are negative. Objective   /72   Pulse 82   Temp 96.2 °F (35.7 °C) (Oral)   Ht 5' 6\" (1.676 m)   Wt 168 lb (76.2 kg)   SpO2 96%   BMI 27.12 kg/m²     PHYSICAL EXAM:  Physical Exam  Vitals signs reviewed. Constitutional:       General: He is not in acute distress. Comments: Weak appearing   HENT:      Head: Normocephalic and atraumatic. Nose: Nose normal.   Eyes:      General: No scleral icterus. Conjunctiva/sclera: Conjunctivae normal.        Comments: Left eye blindness   Neck:      Vascular: No JVD. Trachea: No tracheal deviation. Cardiovascular:      Rate and Rhythm: Normal rate and regular rhythm. Heart sounds: Normal heart sounds. No murmur. Pulmonary:      Effort: No respiratory distress. Breath sounds: Normal breath sounds. No wheezing. Abdominal:      General: Bowel sounds are normal. There is no distension. Palpations: Abdomen is soft. There is no mass. Tenderness: There is no tenderness. Musculoskeletal: Normal range of motion. General: No tenderness or deformity.       Right lower le+ Edema present. Left lower le+ Edema present. Skin:     Findings: No erythema or rash. Neurological:      Mental Status: He is alert and oriented to person, place, and time. Psychiatric:         Thought Content: Thought content normal.             VISIT DIAGNOSES  1. Malignant neoplasm of upper lobe, right bronchus or lung ([address]     2. Secondary malignant neoplasm of other digestive organs ([address]    3. Anemia, chronic renal failure, stage 3 (moderate) (HCC)    4. Fatigue associated with anemia        ASSESSMENT/PLAN:    1.  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      CT chest without contrast at [facility] on 2020 was compared to 10/17/2019 revealing a stable spiculated RUL nodule/mass with similar mediastinal adenopathy. Questionable right hilar adenopathy with diminished assessment due to lack of IV contrast.  Advanced emphysema with severe pulmonary fibrosis. No new pulmonary nodules. CT abdomen and pelvis without contrast at [facility] on 2020 was compared to 10/17/2019 revealing mildly prominent aortocaval RP lymph nodes with position just below the level of the renal vein worrisome for potential lymphatic metastases. This is similar to 10/17/2019 but increased from 2019. Pneumobilia without discrete suspicious focal lesion in the liver. Wall thickening of the duodenum. Similar and stable renal lesions favoring cysts. He has had numerous endoscopies within the past year. About 10 months ago he was having an Endo EUS and had cardiac issues and was actually admitted to the intensive care unit. This area will just be monitored on follow-up scans. Unfortunately his platelets are only [phone] today so we cannot get treatment.     Khadar Allison does have fatigue which is most likely a combination of his Alimta therapy and his anemia        #2

## 2020-01-16 ENCOUNTER — OFFICE VISIT (OUTPATIENT)
Dept: HEMATOLOGY | Age: 80
End: 2020-01-16
Payer: MEDICARE

## 2020-01-16 ENCOUNTER — HOSPITAL ENCOUNTER (OUTPATIENT)
Dept: INFUSION THERAPY | Age: 80
Discharge: HOME | End: 2020-01-16
Payer: MEDICARE

## 2020-01-16 VITALS
HEIGHT: 66 IN | TEMPERATURE: 96.2 F | BODY MASS INDEX: 27 KG/M2 | HEART RATE: 82 BPM | OXYGEN SATURATION: 96 % | DIASTOLIC BLOOD PRESSURE: 72 MMHG | WEIGHT: 168 LBS | SYSTOLIC BLOOD PRESSURE: 136 MMHG

## 2020-01-16 DIAGNOSIS — C80.1 MALIGNANT NEOPLASM (HCC): ICD-10-CM

## 2020-01-16 DIAGNOSIS — N18.4 CHRONIC KIDNEY DISEASE, STAGE IV (SEVERE) (HCC): Primary | ICD-10-CM

## 2020-01-16 DIAGNOSIS — D64.9 ANEMIA, UNSPECIFIED TYPE: ICD-10-CM

## 2020-01-16 DIAGNOSIS — C34.90 NON-SMALL CELL LUNG CANCER, UNSPECIFIED LATERALITY (HCC): ICD-10-CM

## 2020-01-16 PROCEDURE — G8417 CALC BMI ABV UP PARAM F/U: HCPCS | Performed by: PHYSICIAN ASSISTANT

## 2020-01-16 PROCEDURE — 1123F ACP DISCUSS/DSCN MKR DOCD: CPT | Performed by: PHYSICIAN ASSISTANT

## 2020-01-16 PROCEDURE — 1036F TOBACCO NON-USER: CPT | Performed by: PHYSICIAN ASSISTANT

## 2020-01-16 PROCEDURE — 80053 COMPREHEN METABOLIC PANEL: CPT

## 2020-01-16 PROCEDURE — G8484 FLU IMMUNIZE NO ADMIN: HCPCS | Performed by: PHYSICIAN ASSISTANT

## 2020-01-16 PROCEDURE — 84443 ASSAY THYROID STIM HORMONE: CPT

## 2020-01-16 PROCEDURE — 96372 THER/PROPH/DIAG INJ SC/IM: CPT

## 2020-01-16 PROCEDURE — 4040F PNEUMOC VAC/ADMIN/RCVD: CPT | Performed by: PHYSICIAN ASSISTANT

## 2020-01-16 PROCEDURE — 36415 COLL VENOUS BLD VENIPUNCTURE: CPT

## 2020-01-16 PROCEDURE — 96523 IRRIG DRUG DELIVERY DEVICE: CPT

## 2020-01-16 PROCEDURE — 85025 COMPLETE CBC W/AUTO DIFF WBC: CPT

## 2020-01-16 PROCEDURE — G8428 CUR MEDS NOT DOCUMENT: HCPCS | Performed by: PHYSICIAN ASSISTANT

## 2020-01-16 PROCEDURE — 6360000002 HC RX W HCPCS: Performed by: PHYSICIAN ASSISTANT

## 2020-01-16 PROCEDURE — 99213 OFFICE O/P EST LOW 20 MIN: CPT | Performed by: PHYSICIAN ASSISTANT

## 2020-01-16 RX ORDER — SPIRONOLACTONE 25 MG/1
25 TABLET ORAL DAILY
COMMUNITY
End: 2021-12-02

## 2020-01-16 RX ADMIN — EPOETIN ALFA-EPBX 20000 UNITS: 10000 INJECTION, SOLUTION INTRAVENOUS; SUBCUTANEOUS at 10:24

## 2020-01-16 ASSESSMENT — ENCOUNTER SYMPTOMS
TROUBLE SWALLOWING: 0
BACK PAIN: 0
WHEEZING: 0
COUGH: 0
CONSTIPATION: 1
EYE ITCHING: 0
VOICE CHANGE: 0
SHORTNESS OF BREATH: 1
BLOOD IN STOOL: 0
DIARRHEA: 0
EYE DISCHARGE: 0
SORE THROAT: 0
COLOR CHANGE: 0
ABDOMINAL DISTENTION: 0
VOMITING: 0
ABDOMINAL PAIN: 0
NAUSEA: 0

## 2020-01-21 NOTE — PROGRESS NOTES
Progress Note      Pt Name: Omar Newton: 1940  MRN: [id number]    Date of evaluation: 1/23/2020  History Obtained From:  patient, non-family caregiver - , electronic medical record    CHIEF COMPLAINT:    No chief complaint on file. Current active problems  Non-small cell lung cancer  Anemia secondary to chronic renal failure    HISTORY OF PRESENT ILLNESS:    Emir James is a 70 y.o.  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          He completed 4 cycles of combination chemotherapy with carboplatin, Keytruda, Alimta.    Sophia is now  receiving  maintenance therapy with Keytruda and Alimta every 3 weeks.      He was here last week and his follow-up imaging studies were reviewed with him. Overall they appear to be fairly stable. His platelets were only 11,113 last week so he could not get treatment. He is here today for his next cycle of maintenance therapy. He also has anemia from chronic renal failure and gets Procrit injections. His hemoglobin had dropped to 7 so he did go for transfusion packed red blood cells last week. He is feeling stronger since the transfusion. He did take a dexamethasone premed today. He does have benign prostatic hypertrophy but that is stable on his Flomax. He continues taking his Tambocor, metoprolol without any new cardiac issues. Protonix continues without any new exacerbations of GERD. Lower extremity edema overall has actually improved some. TUMOR HISTORY: Adenocarcinoma on the RUL of the lung 11/27/2018  Margaret Vinson had CT scans performed for new onset of weight loss of 13 pounds over the previous year , associated with increased fatigue.    He denied respiratory symptoms of shortness of air, cough or productive sputum.     A CT scan of the chest on 9/12/2018 had been ordered by Dr. Fredy Li due to weight loss and identified a new lobulated right upper lobe 5.7 cm mass that extended back to the right hilum. Numerous mediastinal lymph nodes ranging in size from mm to 1.3 cm and a subcarnial lymph node that measured 1.5 x 1.5 x 3.5 cm.     A CT scan of the abdomen and pelvis with contrast on 9/12/2018 documented a 4.9 x 4 x 4 cm soft tissue mass with peripheral calcification immediately adjacent to the gallbladder in the head of the pancreas area.     An MRI of the abdomen and pelvis W & WO on 10/11/2018 identified in the 4.1 x 3.4 cm soft tissue mass in the subhepatic space, abutting the second portion of the duodenum with mild displacement of the duodenum. There does not appear to be involvement of the pancreas, and the pancreas appears within normal limits. Differential diagnoses include a desmoid tumor, less likely leiomyoma of the wall of the duodenum or malignancy. Dr. Trinity Edwards requested a CT-guided biopsy of the right upper lobe mass. Dr. Blayne Garcia, the radiologist, evaluated the area with a repeat CT scan of the chest on 10/11/2018. He spoke with Dr. Trinity Edwards indicating that he would not be able to perform the biopsy because the tumor was not accessible, it was under the scapula , which blocked access and therefore the biopsy procedure was canceled.     On the CT scan of the chest on 10/11/2018 measurements of the RUL lung mass was 5.7 x 3.2 cm with irregular margins suspicious for a primary lung neoplasm. Soft tissue associated with the tumor appeared to extend into the bronchus supplying this portion of the RUL of the lung. Dr. Blayne Garcia indicated that the mass had an endobronchial component and should be easily assessable via bronchoscopy.     The chest CT also included a portion of the upper abdomen where a soft tissue mass was described in an addendum report. In the subhepatic space measuring 4.0 x 3.9 cm, displacing the second portion of the duodenum medially.   A referral was made to the subhepatic space, abutting the second portion of the duodenum with mild displacement of the duodenum. There does not appear to be involvement of the pancreas, and the pancreas appears within normal limits. Differential diagnoses include a desmoid tumor, less likely leiomyoma of the wall of the duodenum or malignancy.     CT of the abdomen and pelvis without contrast on 3/20/2019 at [facility] was compared to outpatient CT data and pelvis on 9/12/2018 an MRI of the abdomen from 10/11/2018. A soft tissue mass was again encountered in the subhepatic space which abutted the distal stomach and proximal duodenum measuring 8.9 x 5.4 cm which has increased considerably from a prior measurement of 4.1 x 3.4 cm. Medial to the left renal hilum a 3.5 cm lobular mass causing some mild left-sided hydronephrosis.     Mr. Juli Romeo was scheduled for an EGD/EUS by Dr. Jose Reyna as an outpatient procedure on 3/13/2019 for assessment and biopsy of the enlarging peripancreatic/duodenal area mass. Unfortunately, after initiation of the procedure, he began having ventricular tachycardia and the procedure had to be aborted. Kashmir Still was transferred to the ICU for cardiology evaluation and stabilization. He remained hospitalized until 3/18/2019 when he was medically cleared for discharge.     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 No emergent urologic intervention was warranted and he received monitoring of renal function. He had a creatinine level of 1.9 at discharge.     PET scan on 4/2/2019 identified a soft tissue mass in the RUL measuring 1.2 x 3.5 cm with extension to the right anterior hilum with an SUV of 10.1. Evidence of mediastinal and hilar lymphadenopathy, with low-level metabolic activity.  Interval increase in the size of a large mass in the right upper abdomen inseparable from the duodenum measuring 10.7 x 6.9 cm compared to 5.4 x 8.9 cm on 2/20/2019 with an SUV of 23.6. Slight increase in 2 small inseparable masses medial to the hilum of the left kidney measuring 2.2 and 2.6 cm and the other measuring 3.9 cm with a maximum SUV of 18. 6.     Cycle #1 of palliative chemotherapy with Carboplatin, Alimta and Keytruda was initiated 4/18/19 which should also cover this process.     Abdominal/pelvic CT 4/26/19 was performed at [facility] due to abdominal pain and melena. The RUQ mass, within the duodenum decreased to 6.8 x 6.2 cm (was 10.7 x 6.9 cm on PET 4/2/19)     CT abdomen and pelvis without contrast at [facility] on 6/27/2019 was compared to 4/26/2019 revealed complete resolution of the previously identified. Duodenal/subhepatic space soft tissue mass. The previously identified heterogenous enhancing lesion in the left renal pelvis was much less prominent but there does continue to be some mild left caliectasis. CT chest without contrast at [facility] on 1/9/2020 was compared to 10/17/2019 revealing a stable spiculated RUL nodule/mass with similar mediastinal adenopathy. Questionable right hilar adenopathy with diminished assessment due to lack of IV contrast.  Advanced emphysema with severe pulmonary fibrosis. No new pulmonary nodules. CT abdomen and pelvis without contrast at [facility] on 1/9/2020 was compared to 10/17/2019 revealing mildly prominent aortocaval RP lymph nodes with position just below the level of the renal vein worrisome for potential lymphatic metastases. This is similar to 10/17/2019 but increased from 4/26/2019. Pneumobilia without discrete suspicious focal lesion in the liver. Wall thickening of the duodenum. Similar and stable renal lesions favoring cysts. He has had numerous endoscopies within the past year. About 10 months ago he was having an Endo EUS and had cardiac issues and was actually admitted to the intensive care unit.   This area will just be monitored on follow-up scans.          #1 HEMATOLOGICAL HISTORY: IgG kappa MGUS- Dx: 02/23/06. During the course of Mr. Adela Taylor follow up, an abnormally elevated protein was noted on one occasion, which led to workup including quantitative immunoglobulins.      An elevated IgG kappa was encountered. This has remained stable in the 2500 range since early 2006.      A bone marrow biopsy and aspirate on 02/23/06 was negative with only 4% plasma cells.      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      24 hour urine for immunoelectrophoresis did not reveal an M-spike. Serology studies on 9/18/2018 documented an elevated, stable IgG of 2589 with an M spike of 0.7. Immunofixation continued to reveal IgG monoclonal protein with kappa light chain specificity.     #2 HEMATOLOGICAL HISTORY: Iron deficiency anemia, 9/18/2018  Papo Meredith had serology on 9/18/2018 that identified iron deficiency anemia. His lab work was obtained at [facility].  An iron level was 12, iron saturation 7%, ferritin 256  Folate >20, and vitamin B12 1044. Dr. Katarina Solis placed Sophia on ferrous sulfate 325 mg daily on 9/25/2018 , but this failed to resolve the anemia.     An EGD by Dr. Brea Arriaga on 12/11/2018 documented a normal esophagus, normal stomach and a degree of duodenal deformity. Gastric biopsy was urease negative.     Colonoscopy by Dr. Brea Arriaga on 1/9/2019 documented a polyp at 80 cm. Pathology identified a tubular adenoma, negative for high-grade dysplasia. Feraheme administered.     Labs on 3/13/2019:  · Iron 25   · Iron saturation 22%   · TIBC 116   · Ferritin >2000   · Reticulocyte count 0.0578   ·    · Haptoglobin 341   · Folate >20   · Vitamin B12 945   · Erythropoietin 13     He presented to [facility] on 4/26/2019 with melena and abdominal discomfort. He was subsequently admitted     EGD per Dr. Amy Cabral on 4/29/2019 revealed  · LA grade (1 or more mucosal breaks greater than 5 mm, not extending between the tops of the 2 mucosal folds)? esophagitis with no pulmonary disease) ([facility]     Duodenal ulcer     Essential hypertension 10/2/2017    Eye injury     at age 12 from penetrating injury    Gout     HTN (hypertension)     Hydronephrosis     Hydronephrosis, left     Liver abscess     resolved    Lung nodule     Malignant neoplasm ([facility]     Malignant neoplasm of upper lobe, right bronchus or lung (HCC)     MGUS (monoclonal gammopathy of unknown significance)     secondary to an IgG kappa. IgG level in 2,000 range    Monoclonal gammopathy     NICM (nonischemic cardiomyopathy) ([facility]     Non-small cell lung cancer ([facility] 6/4/2019    Non-sustained ventricular tachycardia (HCC)     NSVT (nonsustained ventricular tachycardia) (HCC)     Osteoarthritis     Other fatigue     Other iron deficiency anemias     Secondary malignant neoplasm of other digestive organs ([facility] [facility]     Weight loss         Past Surgical History:   Procedure Laterality Date    BRONCHOSCOPY  11/27/2018    dx of adenocarcinoma RUL  Dr. Traci Shukla CATH LAB PROCEDURE      ENDOSCOPY, COLON, DIAGNOSTIC  03/30/2019    aborted d/t vtach    EYE SURGERY Left     EYE SURGERY  1964    FOOT SURGERY      removal of joint from right toe from drilling accident, [address] [place] [address]  10/29/2003    with resection  Mitul-en-Y proecedure  DR. Jacobsen Ratedarlyn    TUNNELED VENOUS PORT PLACEMENT      UPPER GASTROINTESTINAL ENDOSCOPY N/A 3/13/2019    EGD W/EUS FNA performed by Gurdeep Farfan MD at [address] [place] [address] Endoscopy           Current Outpatient Medications:     spironolactone (ALDACTONE) 25 MG tablet, Take 25 mg by mouth daily, Disp: , Rfl:     senna-docusate (SENOKOT S) 8.6-50 MG per tablet, Take 2 tablets by mouth 2 times daily, Disp: 120 tablet, Rfl: 1    folic acid (FOLVITE) 1 MG tablet, Take 1 tablet by mouth daily, Disp: 60 tablet, Rfl: 3    dexamethasone (DECADRON) 4 MG tablet, Take 1 tablet by mouth See Admin Instructions TAKE 4MG BID ON DAY BEFORE CHEMO, THE DAY OF CHEMO, AND THE DAY AFTER EVERY 21 DAYS, Disp: 24 tablet, Rfl: 2    flecainide (TAMBOCOR) 50 MG tablet, Take 50 mg by mouth 2 times daily, Disp: , Rfl:     sodium bicarbonate 650 MG tablet, Take 650 mg by mouth 2 times daily, Disp: , Rfl:     pantoprazole (PROTONIX) 40 MG tablet, Take 40 mg by mouth daily, Disp: , Rfl:     tamsulosin (FLOMAX) 0.4 MG capsule, Take 0.4 mg by mouth daily, Disp: , Rfl:     metoprolol tartrate (LOPRESSOR) 25 MG tablet, Take 0.5 tablets by mouth 2 times daily, Disp: 60 tablet, Rfl: 0    Multiple Vitamin (MULTI VITAMIN DAILY PO), Take by mouth daily , Disp: , Rfl:      Allergies   Allergen Reactions    Penicillins        Social History     Tobacco Use    Smoking status: Former Smoker     Packs/day: 2.00     Years: 40.00     Pack years: 80.00     Types: Cigarettes     Last attempt to quit: 10/29/2003     Years since quittin.2    Smokeless tobacco: Never Used   Substance Use Topics    Alcohol use: No    Drug use: No       Family History   Problem Relation Age of Onset    Osteoporosis Mother     High Blood Pressure Mother     Asthma Mother     Bleeding Prob Father     Cancer Father         leukemia    Asthma Brother        Subjective   REVIEW OF SYSTEMS:   Review of Systems   Constitutional: Positive for fatigue (Improved to mild after transfusion). Negative for chills, diaphoresis, fever and unexpected weight change. HENT: Negative for mouth sores, nosebleeds, sore throat, trouble swallowing and voice change. Eyes: Negative for discharge and itching. Respiratory: Positive for shortness of breath (Less noticeable after transfusion). Negative for cough and wheezing. Cardiovascular: Negative for chest pain, palpitations and leg swelling. Gastrointestinal: Positive for constipation. Negative for abdominal distention, abdominal pain, blood in stool, diarrhea, nausea and vomiting.    Endocrine: Negative for cold intolerance, heat intolerance, polydipsia and polyuria. Genitourinary: Negative for difficulty urinating, dysuria, hematuria and urgency. Musculoskeletal: Negative for arthralgias, back pain, joint swelling and myalgias. Skin: Negative for color change and rash. Neurological: Positive for light-headedness. Negative for dizziness, tremors, seizures and syncope. Hematological: Negative for adenopathy. Does not bruise/bleed easily. Psychiatric/Behavioral: Negative for behavioral problems and suicidal ideas. The patient is not nervous/anxious. All other systems reviewed and are negative. Objective   There were no vitals taken for this visit. PHYSICAL EXAM:  Physical Exam  Vitals signs reviewed. Constitutional:       General: He is not in acute distress. Comments: Weak appearing   HENT:      Head: Normocephalic and atraumatic. Nose: Nose normal.   Eyes:      General: No scleral icterus. Conjunctiva/sclera: Conjunctivae normal.        Comments: Left eye blindness   Neck:      Vascular: No JVD. Trachea: No tracheal deviation. Cardiovascular:      Rate and Rhythm: Normal rate and regular rhythm. Heart sounds: Normal heart sounds. No murmur. Pulmonary:      Effort: No respiratory distress. Breath sounds: Normal breath sounds. No wheezing. Abdominal:      General: Bowel sounds are normal. There is no distension. Palpations: Abdomen is soft. There is no mass. Tenderness: There is no tenderness. Musculoskeletal: Normal range of motion. General: No tenderness or deformity. Right lower le+ Edema present. Left lower le+ Edema present. Skin:     Findings: No erythema or rash. Neurological:      Mental Status: He is alert and oriented to person, place, and time. Psychiatric:         Thought Content: Thought content normal.             VISIT DIAGNOSES  1. Malignant neoplasm of upper lobe, right bronchus or lung ([facility]     2.  Secondary malignant neoplasm of other digestive organs ([facility]    3. Anemia, chronic renal failure, stage 3 (moderate) (HCC)    4. Fatigue associated with anemia        ASSESSMENT/PLAN:    1.  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      CT chest without contrast at \[facility]\"" on 1/9/2020 was compared to 10/17/2019 revealing a stable spiculated RUL nodule/mass with similar mediastinal adenopathy. Questionable right hilar adenopathy with diminished assessment due to lack of IV contrast.  Advanced emphysema with severe pulmonary fibrosis. No new pulmonary nodules. CT abdomen and pelvis without contrast at \[facility]\"" on 1/9/2020 was compared to 10/17/2019 revealing mildly prominent aortocaval RP lymph nodes with position just below the level of the renal vein worrisome for potential lymphatic metastases. This is similar to 10/17/2019 but increased from 4/26/2019. Pneumobilia without discrete suspicious focal lesion in the liver. Wall thickening of the duodenum. Similar and stable renal lesions favoring cysts. He has had numerous endoscopies within the past year. About 10 months ago he was having an Endo EUS and had cardiac issues and was actually admitted to the intensive care unit. This area will just be monitored on follow-up scans. CBC today reveals a WBC of 7.65, Hgb 10.8, ,000. CMP from 1/15/2020 revealed a creatinine of 1.7 with GFR 41.7 which is actually improved for him. TSH was okay at 1.25    PLAN  Continue maintenance Keytruda and Alimta every 3 weeks. Weekly CBCs          #2   Anemia of chronic renal failure and chemotherapy associated anemia    Hemoglobin today is 10.8 after transfusions were set up last week. Procrit 20,000 units weekly given as indicated to decrease transfusion requirements. He does have some bilateral lower extremity edema.   Dr. María Elena Bradford put him on Aldactone 25 mg a day to help. He is elevating his legs. Louis Gray he does have renal insufficiency with a potassium level running high normal so we will need to double check this when he comes next week. Potassium on 1/15/2020 was okay at 4.6         Return in about 3 weeks (around 2/13/2020) for With  in treatment room.      iHpolito Renee PA-C  11:02 AM  1/23/2020

## 2020-01-23 ENCOUNTER — APPOINTMENT (OUTPATIENT)
Dept: INFUSION THERAPY | Age: 80
End: 2020-01-23
Payer: MEDICARE

## 2020-01-23 ENCOUNTER — OFFICE VISIT (OUTPATIENT)
Dept: HEMATOLOGY | Age: 80
End: 2020-01-23
Payer: MEDICARE

## 2020-01-23 ENCOUNTER — HOSPITAL ENCOUNTER (OUTPATIENT)
Dept: INFUSION THERAPY | Age: 80
Discharge: HOME | End: 2020-01-23
Payer: MEDICARE

## 2020-01-23 VITALS
WEIGHT: 166 LBS | SYSTOLIC BLOOD PRESSURE: 126 MMHG | OXYGEN SATURATION: 94 % | TEMPERATURE: 96.4 F | DIASTOLIC BLOOD PRESSURE: 70 MMHG | HEIGHT: 66 IN | HEART RATE: 82 BPM | BODY MASS INDEX: 26.68 KG/M2

## 2020-01-23 DIAGNOSIS — C34.11 MALIGNANT NEOPLASM OF UPPER LOBE, RIGHT BRONCHUS OR LUNG (HCC): Primary | ICD-10-CM

## 2020-01-23 DIAGNOSIS — D64.9 ANEMIA, UNSPECIFIED TYPE: ICD-10-CM

## 2020-01-23 DIAGNOSIS — C34.90 NON-SMALL CELL LUNG CANCER, UNSPECIFIED LATERALITY (HCC): ICD-10-CM

## 2020-01-23 DIAGNOSIS — C80.1 MALIGNANT NEOPLASM (HCC): ICD-10-CM

## 2020-01-23 DIAGNOSIS — N18.4 CHRONIC KIDNEY DISEASE, STAGE IV (SEVERE) (HCC): ICD-10-CM

## 2020-01-23 PROCEDURE — G8417 CALC BMI ABV UP PARAM F/U: HCPCS | Performed by: PHYSICIAN ASSISTANT

## 2020-01-23 PROCEDURE — 96417 CHEMO IV INFUS EACH ADDL SEQ: CPT

## 2020-01-23 PROCEDURE — 1123F ACP DISCUSS/DSCN MKR DOCD: CPT | Performed by: PHYSICIAN ASSISTANT

## 2020-01-23 PROCEDURE — 99213 OFFICE O/P EST LOW 20 MIN: CPT | Performed by: PHYSICIAN ASSISTANT

## 2020-01-23 PROCEDURE — G8428 CUR MEDS NOT DOCUMENT: HCPCS | Performed by: PHYSICIAN ASSISTANT

## 2020-01-23 PROCEDURE — G8484 FLU IMMUNIZE NO ADMIN: HCPCS | Performed by: PHYSICIAN ASSISTANT

## 2020-01-23 PROCEDURE — 96375 TX/PRO/DX INJ NEW DRUG ADDON: CPT

## 2020-01-23 PROCEDURE — 6360000002 HC RX W HCPCS: Performed by: INTERNAL MEDICINE

## 2020-01-23 PROCEDURE — 1036F TOBACCO NON-USER: CPT | Performed by: PHYSICIAN ASSISTANT

## 2020-01-23 PROCEDURE — 6360000002 HC RX W HCPCS: Performed by: PHYSICIAN ASSISTANT

## 2020-01-23 PROCEDURE — 96372 THER/PROPH/DIAG INJ SC/IM: CPT

## 2020-01-23 PROCEDURE — 4040F PNEUMOC VAC/ADMIN/RCVD: CPT | Performed by: PHYSICIAN ASSISTANT

## 2020-01-23 PROCEDURE — 85025 COMPLETE CBC W/AUTO DIFF WBC: CPT

## 2020-01-23 PROCEDURE — 2580000003 HC RX 258: Performed by: INTERNAL MEDICINE

## 2020-01-23 PROCEDURE — 96413 CHEMO IV INFUSION 1 HR: CPT

## 2020-01-23 RX ORDER — CYANOCOBALAMIN 1000 UG/ML
1000 INJECTION INTRAMUSCULAR; SUBCUTANEOUS ONCE
Status: COMPLETED | OUTPATIENT
Start: 2020-01-23 | End: 2020-01-23

## 2020-01-23 RX ORDER — PALONOSETRON 0.05 MG/ML
0.25 INJECTION, SOLUTION INTRAVENOUS ONCE
Status: COMPLETED | OUTPATIENT
Start: 2020-01-23 | End: 2020-01-23

## 2020-01-23 RX ORDER — AMOXICILLIN 250 MG
2 CAPSULE ORAL 2 TIMES DAILY
Qty: 120 TABLET | Refills: 1 | Status: SHIPPED | OUTPATIENT
Start: 2020-01-23 | End: 2020-04-29 | Stop reason: SDUPTHER

## 2020-01-23 RX ORDER — SODIUM CHLORIDE 0.9 % (FLUSH) 0.9 %
10 SYRINGE (ML) INJECTION PRN
Status: DISCONTINUED | OUTPATIENT
Start: 2020-01-23 | End: 2020-01-24 | Stop reason: HOSPADM

## 2020-01-23 RX ORDER — DEXAMETHASONE SODIUM PHOSPHATE 10 MG/ML
10 INJECTION, SOLUTION INTRAMUSCULAR; INTRAVENOUS ONCE
Status: COMPLETED | OUTPATIENT
Start: 2020-01-23 | End: 2020-01-23

## 2020-01-23 RX ADMIN — EPOETIN ALFA-EPBX 20000 UNITS: 10000 INJECTION, SOLUTION INTRAVENOUS; SUBCUTANEOUS at 10:57

## 2020-01-23 RX ADMIN — PALONOSETRON 0.25 MG: 0.05 INJECTION, SOLUTION INTRAVENOUS at 10:15

## 2020-01-23 RX ADMIN — SODIUM CHLORIDE 520 MG: 9 INJECTION, SOLUTION INTRAVENOUS at 11:30

## 2020-01-23 RX ADMIN — SODIUM CHLORIDE 200 MG: 9 INJECTION, SOLUTION INTRAVENOUS at 10:55

## 2020-01-23 RX ADMIN — SODIUM CHLORIDE, PRESERVATIVE FREE 10 ML: 5 INJECTION INTRAVENOUS at 11:40

## 2020-01-23 RX ADMIN — CYANOCOBALAMIN 1000 MCG: 1000 INJECTION, SOLUTION INTRAMUSCULAR; SUBCUTANEOUS at 10:15

## 2020-01-23 RX ADMIN — DEXAMETHASONE SODIUM PHOSPHATE 10 MG: 10 INJECTION, SOLUTION INTRAMUSCULAR; INTRAVENOUS at 10:15

## 2020-01-23 ASSESSMENT — ENCOUNTER SYMPTOMS
SHORTNESS OF BREATH: 1
BACK PAIN: 0
TROUBLE SWALLOWING: 0
ABDOMINAL PAIN: 0
VOICE CHANGE: 0
CONSTIPATION: 1
COLOR CHANGE: 0
COUGH: 0
SORE THROAT: 0
VOMITING: 0
NAUSEA: 0
EYE DISCHARGE: 0
EYE ITCHING: 0
ABDOMINAL DISTENTION: 0
DIARRHEA: 0
BLOOD IN STOOL: 0
WHEEZING: 0

## 2020-01-30 ENCOUNTER — HOSPITAL ENCOUNTER (OUTPATIENT)
Dept: INFUSION THERAPY | Age: 80
Discharge: HOME | End: 2020-01-30
Payer: MEDICARE

## 2020-01-30 VITALS
OXYGEN SATURATION: 92 % | DIASTOLIC BLOOD PRESSURE: 62 MMHG | BODY MASS INDEX: 25.78 KG/M2 | HEIGHT: 66 IN | WEIGHT: 160.4 LBS | HEART RATE: 80 BPM | SYSTOLIC BLOOD PRESSURE: 110 MMHG

## 2020-01-30 DIAGNOSIS — C34.90 NON-SMALL CELL LUNG CANCER, UNSPECIFIED LATERALITY (HCC): ICD-10-CM

## 2020-01-30 DIAGNOSIS — N18.4 CHRONIC KIDNEY DISEASE, STAGE IV (SEVERE) (HCC): Primary | ICD-10-CM

## 2020-01-30 DIAGNOSIS — D64.9 ANEMIA, UNSPECIFIED TYPE: ICD-10-CM

## 2020-01-30 DIAGNOSIS — C80.1 MALIGNANT NEOPLASM (HCC): ICD-10-CM

## 2020-01-30 PROCEDURE — 85025 COMPLETE CBC W/AUTO DIFF WBC: CPT

## 2020-01-30 PROCEDURE — 96372 THER/PROPH/DIAG INJ SC/IM: CPT

## 2020-01-30 PROCEDURE — 6360000002 HC RX W HCPCS: Performed by: PHYSICIAN ASSISTANT

## 2020-01-30 RX ADMIN — EPOETIN ALFA-EPBX 20000 UNITS: 10000 INJECTION, SOLUTION INTRAVENOUS; SUBCUTANEOUS at 14:50

## 2020-02-06 ENCOUNTER — HOSPITAL ENCOUNTER (OUTPATIENT)
Dept: INFUSION THERAPY | Age: 80
Discharge: HOME | End: 2020-02-06
Payer: MEDICARE

## 2020-02-06 VITALS
WEIGHT: 160.6 LBS | OXYGEN SATURATION: 94 % | BODY MASS INDEX: 25.81 KG/M2 | HEART RATE: 78 BPM | DIASTOLIC BLOOD PRESSURE: 78 MMHG | HEIGHT: 66 IN | SYSTOLIC BLOOD PRESSURE: 132 MMHG

## 2020-02-06 DIAGNOSIS — D64.9 ANEMIA, UNSPECIFIED TYPE: ICD-10-CM

## 2020-02-06 DIAGNOSIS — C80.1 MALIGNANT NEOPLASM (HCC): ICD-10-CM

## 2020-02-06 DIAGNOSIS — N18.4 CHRONIC KIDNEY DISEASE, STAGE IV (SEVERE) (HCC): Primary | ICD-10-CM

## 2020-02-06 DIAGNOSIS — C34.90 NON-SMALL CELL LUNG CANCER, UNSPECIFIED LATERALITY (HCC): ICD-10-CM

## 2020-02-06 PROCEDURE — 85025 COMPLETE CBC W/AUTO DIFF WBC: CPT

## 2020-02-06 PROCEDURE — 6360000002 HC RX W HCPCS: Performed by: PHYSICIAN ASSISTANT

## 2020-02-06 PROCEDURE — 96372 THER/PROPH/DIAG INJ SC/IM: CPT

## 2020-02-06 RX ADMIN — EPOETIN ALFA-EPBX 20000 UNITS: 10000 INJECTION, SOLUTION INTRAVENOUS; SUBCUTANEOUS at 15:37

## 2020-02-12 NOTE — PROGRESS NOTES
Progress Note      Pt Name: Qi Killer: 1940  MRN: [id number]    Date of evaluation: 2/13/2020  History Obtained From:  patient, non-family caregiver - , electronic medical record    CHIEF COMPLAINT:    Chief Complaint   Patient presents with    Lung Cancer     Current active problems  Non-small cell lung cancer  Anemia secondary to chronic renal failure    HISTORY OF PRESENT ILLNESS:    Juan Salinas is a 70 y.o.  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          He completed 4 cycles of combination chemotherapy with carboplatin, Keytruda, Alimta.    Sophia is now  receiving  maintenance therapy with Keytruda and Alimta every 3 weeks.      He has been tolerating the Keytruda and Alimta well. He is taking his dexamethasone prep for the Alimta. He is serology-labs have been stable. He has anemia related to chronic renal insufficiency and he is responded much better to Procrit. Energy level overall has been improving. He is breathing better. He does have benign prostatic hypertrophy but that is stable on his Flomax. He continues taking his Tambocor, metoprolol without any new cardiac issues. Protonix continues without any new exacerbations of GERD. Lower extremity edema overall has actually improved some. TUMOR HISTORY: Adenocarcinoma on the RUL of the lung 11/27/2018  Boom Maharaj had CT scans performed for new onset of weight loss of 13 pounds over the previous year , associated with increased fatigue. He denied respiratory symptoms of shortness of air, cough or productive sputum.     A CT scan of the chest on 9/12/2018 had been ordered by Dr. Carolina Ibrahim due to weight loss and identified a new lobulated right upper lobe 5.7 cm mass that extended back to the right hilum.    Numerous mediastinal lymph nodes ranging in size from mm to 1.3 cm and a subcarnial lymph node that measured 1.5 x 1.5 x 3.5 cm.     A CT scan of the abdomen and pelvis with contrast on 9/12/2018 documented a 4.9 x 4 x 4 cm soft tissue mass with peripheral calcification immediately adjacent to the gallbladder in the head of the pancreas area.     An MRI of the abdomen and pelvis W & WO on 10/11/2018 identified in the 4.1 x 3.4 cm soft tissue mass in the subhepatic space, abutting the second portion of the duodenum with mild displacement of the duodenum. There does not appear to be involvement of the pancreas, and the pancreas appears within normal limits. Differential diagnoses include a desmoid tumor, less likely leiomyoma of the wall of the duodenum or malignancy. Dr. Adrienne Chacon requested a CT-guided biopsy of the right upper lobe mass. Dr. Maria Del Carmen Sheikh, the radiologist, evaluated the area with a repeat CT scan of the chest on 10/11/2018. He spoke with Dr. Adrienne Chacon indicating that he would not be able to perform the biopsy because the tumor was not accessible, it was under the scapula , which blocked access and therefore the biopsy procedure was canceled.     On the CT scan of the chest on 10/11/2018 measurements of the RUL lung mass was 5.7 x 3.2 cm with irregular margins suspicious for a primary lung neoplasm. Soft tissue associated with the tumor appeared to extend into the bronchus supplying this portion of the RUL of the lung. Dr. Maria Del Carmen Sheikh indicated that the mass had an endobronchial component and should be easily assessable via bronchoscopy.     The chest CT also included a portion of the upper abdomen where a soft tissue mass was described in an addendum report. In the subhepatic space measuring 4.0 x 3.9 cm, displacing the second portion of the duodenum medially.   A referral was made to Dr. Mohinder Huang for consideration for bronchoscopy for biopsy.     On 10/24/2018, Dr. Lewayne Cooks performed a bronchoscopy with EBUS guided biopsy of a 3 cm subcarinal lymph node along decreased in size from a prior value of 6.1 x 3.6 cm. Subhepatic mass adjacent to the descending duodenum had increased in size significantly to 4.3 x 9 cm compared to 4.1 x 3.4 cm on the 10/11/18 MRI of the abdomen.     CT of the abdomen and pelvis without contrast on 3/20/2019 at [facility] was compared to outpatient CT data and pelvis on 9/12/2018 an MRI of the abdomen from 10/11/2018. A soft tissue mass was again encountered in the subhepatic space which abutted the distal stomach and proximal duodenum measuring 8.9 x 5.4 cm which has increased considerably from a prior measurement of 4.1 x 3.4 cm. Medial to the left renal hilum a 3.5 cm lobular mass causing some mild left-sided hydronephrosis.     Mr. Shayne Acuna was referred to Dr. Macrina Velez who saw him on 1/22/2019 anticipating plans for a curative resection.      Dr. Monik Horton received a phone call on 2/20/2019 from Dr. Sheryl Espinoza , indicating that the previously documented an abdominal pancreatic area mass had doubled in size and was causing compression into the left kidney/renal pelvis producing a hydroureter.      Dr. Sheryl Espinoza arranged a referral to Dr. Tyna Kanner who will be placing a stent 3/8/2019.     Mr. Shayne Acuna was scheduled to be seen by gastroenterology at [facility] on 3/13/2019 for EGD/EUS assessment and biopsy of the enlarging peripancreatic/duodenal area mass. With a decrease in the lung mass and increased size of the subhepatic mass-surgical resection was abandoned at present pending further evaluation of the subhepatic mass. Dr. Monik Horton discussed treatment options with the unresectable lung mass and the per he pancreatic mass and recommended a combination of Keytruda, Alimta, carboplatin. He was subsequently admitted to [facility] 4/26 - 4/30/2019 with abdominal pain and melena. WBC fantasma was 0.75 with ANC 0.34. PLT did drop to 24,000. He did have duodenal ulcerations that were bleeding on endoscopy.  He was stabilized but then readmitted from 5/8 - 5/10/2019 with recurrent melanoma. A repeat endoscopy was performed. It was felt that exacerbation of his bleeding from the duodenal came about by his thrombocytopenia from treatment. Subsequent dosing was adjusted and Neulasta was added.     CT chest without contrast at [facility] on 6/27/2019 was compared to 2/18/2019 revealed that the right lung mass that extended into the right hilum has decreased from 5.2 x 3.5 down to 4.6 x 3.6. There is increased central cavitation in the mass consistent with tumor necrosis. Mediastinal lymphadenopathy is relatively stable.     CT abdomen and pelvis without contrast at [facility] on 6/27/2019 was compared to 4/26/2019 revealed complete resolution of the previously identified. Duodenal/subhepatic space soft tissue mass. The previously identified heterogenous enhancing lesion in the left renal pelvis was much less prominent but there does continue to be some mild left caliectasis. I reviewed the CT results with Dr. Maile Rodriguez on 7/5/2019 who then relayed them as well to [facility]. We've had excellent results from this combination of therapy. He will complete the fourth combination therapy of Keytruda, carbo, Alimta   and then go to maintenance Keytruda plus Alimta. CT chest without contrast at [facility] on 1/9/2020 was compared to 10/17/2019 revealing a stable spiculated RUL nodule/mass with similar mediastinal adenopathy. Questionable right hilar adenopathy with diminished assessment due to lack of IV contrast.  Advanced emphysema with severe pulmonary fibrosis. No new pulmonary nodules. CT abdomen and pelvis without contrast at [facility] on 1/9/2020 was compared to 10/17/2019 revealing mildly prominent aortocaval RP lymph nodes with position just below the level of the renal vein worrisome for potential lymphatic metastases. This is similar to 10/17/2019 but increased from 4/26/2019. Pneumobilia without discrete suspicious focal lesion in the liver. Wall thickening of the duodenum.   Similar and stable renal lesions favoring cysts. He has had numerous endoscopies within the past year. About 10 months ago he was having an Endo EUS and had cardiac issues and was actually admitted to the intensive care unit. This area will just be monitored on follow-up scans.       TREATMENT SUMMARY  1. Keytruda, carboplatin, Alimta initiated 4/18/2019 to be given every 3 weeks for 4 cycles - 4th cycle 7/5/2019, then Keytruda + Alimta as maintenance to continue indefinitely until progression or intolerable side effects      #2 TUMOR HISTORY: Pancreatic mass diagnosed 10/29/03  Mr. Paul Booth presented in October 2003 with obstructive jaundice. A CT scan of the abdomen on 10/26/2003 documented a 3.2 x 4 x 4.2 cm mass in the head of the pancreas.      On 10/29/03 Dr. Bunny Berumen performed a resection of a portion of the head of the pancreas on [facility] along with a Mitul-en-Y procedure and a choledochojejunostomy for what was felt to be a pancreatic cancer. His pancreas was bypassed because of the findings. Pathology of the biopsies were negative for malignancy showing a fibrosed pancreas. Mr. Paul Booth was referred to Dr. Lisbeth Yates in [place].     Dr. Lisbeth Yates performed ERCP with an EUS and biopsy on 02/26/04   Pathology again was negative for cancer or malignancy. Routine periodic serologic and radiographic monitoring has not disclosed progression of the mass or development of new malignancy or increase in pancreatic tumor markers.      A CT scan of the abdomen and pelvis with contrast on 9/12/2018 documented a 4.9 x 4 x 4 cm soft tissue mass with peripheral calcification immediately adjacent to the gallbladder in the head of the pancreas area.     An MRI of the abdomen and pelvis W & WO on 10/11/2018 identified in the 4.1 x 3.4 cm soft tissue mass in the subhepatic space, abutting the second portion of the duodenum with mild displacement of the duodenum.  There does not appear to be involvement of the pancreas, and the pancreas appears within normal limits. Differential diagnoses include a desmoid tumor, less likely leiomyoma of the wall of the duodenum or malignancy.     CT of the abdomen and pelvis without contrast on 3/20/2019 at [facility] was compared to outpatient CT data and pelvis on 9/12/2018 an MRI of the abdomen from 10/11/2018. A soft tissue mass was again encountered in the subhepatic space which abutted the distal stomach and proximal duodenum measuring 8.9 x 5.4 cm which has increased considerably from a prior measurement of 4.1 x 3.4 cm. Medial to the left renal hilum a 3.5 cm lobular mass causing some mild left-sided hydronephrosis.     Mr. Sangeetha Vences was scheduled for an EGD/EUS by Dr. Gilberto Mayfield as an outpatient procedure on 3/13/2019 for assessment and biopsy of the enlarging peripancreatic/duodenal area mass. Unfortunately, after initiation of the procedure, he began having ventricular tachycardia and the procedure had to be aborted. Es Villalba was transferred to the ICU for cardiology evaluation and stabilization. He remained hospitalized until 3/18/2019 when he was medically cleared for discharge.     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 No emergent urologic intervention was warranted and he received monitoring of renal function. He had a creatinine level of 1.9 at discharge.     PET scan on 4/2/2019 identified a soft tissue mass in the RUL measuring 1.2 x 3.5 cm with extension to the right anterior hilum with an SUV of 10.1. Evidence of mediastinal and hilar lymphadenopathy, with low-level metabolic activity. Interval increase in the size of a large mass in the right upper abdomen inseparable from the duodenum measuring 10.7 x 6.9 cm compared to 5.4 x 8.9 cm on 2/20/2019 with an SUV of 23.6.  Slight increase in 2 small inseparable masses medial to the hilum of the left kidney measuring 2.2 and 2.6 cm and the other measuring 3.9 cm with a maximum SUV of 18. 6.     Cycle #1 of palliative chemotherapy with Carboplatin, Alimta and Keytruda was initiated 4/18/19 which should also cover this process.     Abdominal/pelvic CT 4/26/19 was performed at hospitals due to abdominal pain and melena. The RUQ mass, within the duodenum decreased to 6.8 x 6.2 cm (was 10.7 x 6.9 cm on PET 4/2/19)     CT abdomen and pelvis without contrast at hospitals on 6/27/2019 was compared to 4/26/2019 revealed complete resolution of the previously identified. Duodenal/subhepatic space soft tissue mass. The previously identified heterogenous enhancing lesion in the left renal pelvis was much less prominent but there does continue to be some mild left caliectasis. CT chest without contrast at hospitals on 1/9/2020 was compared to 10/17/2019 revealing a stable spiculated RUL nodule/mass with similar mediastinal adenopathy. Questionable right hilar adenopathy with diminished assessment due to lack of IV contrast.  Advanced emphysema with severe pulmonary fibrosis. No new pulmonary nodules. CT abdomen and pelvis without contrast at hospitals on 1/9/2020 was compared to 10/17/2019 revealing mildly prominent aortocaval RP lymph nodes with position just below the level of the renal vein worrisome for potential lymphatic metastases. This is similar to 10/17/2019 but increased from 4/26/2019. Pneumobilia without discrete suspicious focal lesion in the liver. Wall thickening of the duodenum. Similar and stable renal lesions favoring cysts. He has had numerous endoscopies within the past year. About 10 months ago he was having an Endo EUS and had cardiac issues and was actually admitted to the intensive care unit. This area will just be monitored on follow-up scans.          #1 HEMATOLOGICAL HISTORY: IgG kappa MGUS- Dx: 02/23/06.   During the course of Mr. Lyn Corrales follow up, an abnormally elevated protein was noted on one occasion, which led to workup including quantitative immunoglobulins.      An elevated IgG kappa was encountered. This has remained stable in the 2500 range since early 2006.      A bone marrow biopsy and aspirate on 02/23/06 was negative with only 4% plasma cells.      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      24 hour urine for immunoelectrophoresis did not reveal an M-spike. Serology studies on 9/18/2018 documented an elevated, stable IgG of 2589 with an M spike of 0.7. Immunofixation continued to reveal IgG monoclonal protein with kappa light chain specificity.     #2 HEMATOLOGICAL HISTORY: Iron deficiency anemia, 9/18/2018  Alexis Fragoso had serology on 9/18/2018 that identified iron deficiency anemia. His lab work was obtained at [facility].  An iron level was 12, iron saturation 7%, ferritin 256  Folate >20, and vitamin B12 1044. Dr. Paresh Wilkes placed Sophia on ferrous sulfate 325 mg daily on 9/25/2018 , but this failed to resolve the anemia.     An EGD by Dr. Zoran Garcia on 12/11/2018 documented a normal esophagus, normal stomach and a degree of duodenal deformity. Gastric biopsy was urease negative.     Colonoscopy by Dr. Zoran Garcia on 1/9/2019 documented a polyp at 80 cm. Pathology identified a tubular adenoma, negative for high-grade dysplasia. Feraheme administered.     Labs on 3/13/2019:  · Iron 25   · Iron saturation 22%   · TIBC 116   · Ferritin >2000   · Reticulocyte count 0.0578   ·    · Haptoglobin 341   · Folate >20   · Vitamin B12 945   · Erythropoietin 13     He presented to [facility] on 4/26/2019 with melena and abdominal discomfort. He was subsequently admitted     EGD per Dr. Laurie Vivas on 4/29/2019 revealed  · LA grade (1 or more mucosal breaks greater than 5 mm, not extending between the tops of the 2 mucosal folds)? esophagitis with no bleeding found in the distal esophagus   · Stomach was normal, biopsies for H. pylori taken.    · Cardia and gastric fundus were normal on retroflexion   · One nonbleeding cratered duodenal ulcer with no stigmata of bleeding was found in the duodenal bulb lesion was about 9 mm. · Many nonbleeding cratered, linear and superficial duodenal ulcers with no stigmata of bleeding were found in the second portion of the duodenum. The largest lesion was 6 mm in largest dimension. No mass encountered and anastomosis not seen.     He was admitted to [facility] on 5/8/2019 with melena, hematochezia, anemia, thrombocytopenia     Endoscopy performed by Dr. Rachel Clarke on 5/9/2019 revealed  · Normal esophagus   · Gastric mucosal variant. 2 erythematous spots in the antrum, ? AVM   · Normal examined duodenum   · Nothing found to account for symptoms   He developed pancytopenia from chemotherapy and did require transfusions. His hemoglobin dropped to 7.3 on 6/25/2019 after his last treatment. He received 2 units packed red blood cells as an outpatient.     Serology 6/13/2019  Serum Fe - 117  TIBC - 587  Fe sat - 19.9%  Ferritin - 1,000  Iron levels have been adequately replaced. 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     TREATMENT SUMMARY  1. Feraheme 510 mg IV on 2/14 and 2/21/19   2.  Venofer 200 mg IV daily 5/8 - 5/10/2019 as IP at [facility]   3. s/p 2 units pRBC on 4/26/19 and 2 units pRBC on 4/29/2019 as IP at [facility]?  s/p 2 units pRBC on?5/8/2019   s/p 2 pheresis plts on 5/8/2019  s/p 2 units pRBC as OP 6/26/2019      Past Medical History:   Diagnosis Date    Abnormal weight loss     Anemia     Anemia, unspecified     Blind left eye     Cardiomyopathy ([address]     with compensated CHF    Cellulitis     Cellulitis of unspecified part of limb     Chronic kidney disease, stage IV (severe) ([address]     Dr. Sarath Bates COPD (chronic obstructive pulmonary disease) [facility]     Duodenal ulcer     Essential hypertension 10/2/2017    Eye injury     at age 12 from penetrating injury    Gout     HTN (hypertension)     Hydronephrosis     Hydronephrosis, left     Liver abscess     resolved    Lung nodule     Malignant neoplasm (HCC)     Malignant neoplasm of upper lobe, right bronchus or lung (HCC)     MGUS (monoclonal gammopathy of unknown significance)     secondary to an IgG kappa. IgG level in 2,000 range    Monoclonal gammopathy     NICM (nonischemic cardiomyopathy) [facility]     Non-small cell lung cancer ([facility] 6/4/2019    Non-sustained ventricular tachycardia (HCC)     NSVT (nonsustained ventricular tachycardia) (HCC)     Osteoarthritis     Other fatigue     Other iron deficiency anemias     Secondary malignant neoplasm of other digestive organs ([facility]     Weight loss         Past Surgical History:   Procedure Laterality Date    BRONCHOSCOPY  11/27/2018    dx of adenocarcinoma RUL  Dr. Singh Rachel CATH LAB PROCEDURE      ENDOSCOPY, COLON, DIAGNOSTIC  03/30/2019    aborted d/t vtach    EYE SURGERY Left     EYE SURGERY  1964    FOOT SURGERY      removal of joint from right toe from drilling accident, [facility]  10/29/2003    with resection  Mitul-en-Y proecedure  DR. Shiv Alvarado    TUNNELED VENOUS PORT PLACEMENT      UPPER GASTROINTESTINAL ENDOSCOPY N/A 3/13/2019    EGD W/EUS FNA performed by Chica Leyva MD at [facility]           Current Outpatient Medications:     senna-docusate (SENOKOT S) 8.6-50 MG per tablet, Take 2 tablets by mouth 2 times daily, Disp: 120 tablet, Rfl: 1    spironolactone (ALDACTONE) 25 MG tablet, Take 25 mg by mouth daily, Disp: , Rfl:     folic acid (FOLVITE) 1 MG tablet, Take 1 tablet by mouth daily, Disp: 60 tablet, Rfl: 3    dexamethasone (DECADRON) 4 MG tablet, Take 1 tablet by mouth See Admin Instructions TAKE 4MG BID ON DAY BEFORE CHEMO, THE DAY OF CHEMO, AND THE DAY AFTER EVERY 21 DAYS, Disp: 24 tablet, Rfl: 2    flecainide (TAMBOCOR) 50 MG tablet, Take 50 mg by mouth 2 times daily, Disp: , Rfl:     sodium bicarbonate 650 MG tablet, Take 650 mg by mouth 2 times daily, Disp: , Rfl:     pantoprazole (PROTONIX) 40 MG tablet, Take 40 mg by mouth daily, Disp: , Rfl:     tamsulosin (FLOMAX) 0.4 MG capsule, Take 0.4 mg by mouth daily, Disp: , Rfl:     metoprolol tartrate (LOPRESSOR) 25 MG tablet, Take 0.5 tablets by mouth 2 times daily, Disp: 60 tablet, Rfl: 0    Multiple Vitamin (MULTI VITAMIN DAILY PO), Take by mouth daily , Disp: , Rfl:      Allergies   Allergen Reactions    Penicillins        Social History     Tobacco Use    Smoking status: Former Smoker     Packs/day: 2.00     Years: 40.00     Pack years: 80.00     Types: Cigarettes     Last attempt to quit: 10/29/2003     Years since quittin.3    Smokeless tobacco: Never Used   Substance Use Topics    Alcohol use: No    Drug use: No       Family History   Problem Relation Age of Onset    Osteoporosis Mother     High Blood Pressure Mother     Asthma Mother     Bleeding Prob Father     Cancer Father         leukemia    Asthma Brother        Subjective   REVIEW OF SYSTEMS:   Review of Systems   Constitutional: Positive for fatigue (Improved ). Negative for chills, diaphoresis, fever and unexpected weight change. HENT: Negative for mouth sores, nosebleeds, sore throat, trouble swallowing and voice change. Eyes: Negative for discharge and itching. Respiratory: Positive for shortness of breath (Mild ). Negative for cough and wheezing. Cardiovascular: Negative for chest pain, palpitations and leg swelling. Gastrointestinal: Positive for constipation. Negative for abdominal distention, abdominal pain, blood in stool, diarrhea, nausea and vomiting. Endocrine: Negative for cold intolerance, heat intolerance, polydipsia and polyuria. Genitourinary: Negative for difficulty urinating, dysuria, hematuria and urgency. Musculoskeletal: Negative for arthralgias, back pain, joint swelling and myalgias.    Skin: Negative for color change and rash. Neurological: Positive for light-headedness. Negative for dizziness, tremors, seizures and syncope. Hematological: Negative for adenopathy. Does not bruise/bleed easily. Psychiatric/Behavioral: Negative for behavioral problems and suicidal ideas. The patient is not nervous/anxious. All other systems reviewed and are negative. Objective   /80   Pulse 81   Temp 97.7 °F (36.5 °C) (Oral)   Ht 5' 6\" (1.676 m)   Wt 163 lb (73.9 kg)   SpO2 95%   BMI 26.31 kg/m²     PHYSICAL EXAM:  Physical Exam  Vitals signs reviewed. Constitutional:       General: He is not in acute distress. Comments: Weak appearing   HENT:      Head: Normocephalic and atraumatic. Nose: Nose normal.   Eyes:      General: No scleral icterus. Conjunctiva/sclera: Conjunctivae normal.        Comments: Left eye blindness   Neck:      Vascular: No JVD. Trachea: No tracheal deviation. Cardiovascular:      Rate and Rhythm: Normal rate and regular rhythm. Heart sounds: Normal heart sounds. No murmur. Pulmonary:      Effort: No respiratory distress. Breath sounds: Normal breath sounds. No wheezing. Abdominal:      General: Bowel sounds are normal. There is no distension. Palpations: Abdomen is soft. There is no mass. Tenderness: There is no abdominal tenderness. Musculoskeletal: Normal range of motion. General: No tenderness or deformity. Right lower le+ Edema present. Left lower le+ Edema present. Skin:     Findings: No erythema or rash. Neurological:      Mental Status: He is alert and oriented to person, place, and time. Psychiatric:         Thought Content: Thought content normal.             VISIT DIAGNOSES  1. Malignant neoplasm of upper lobe, right bronchus or lung ([address]     2. Secondary malignant neoplasm of other digestive organs ([address]    3. Anemia, chronic renal failure, stage 3 (moderate) (HCC)    4.  Fatigue associated with anemia        ASSESSMENT/PLAN:    1.  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      CT chest without contrast at [facility] on 1/9/2020 was compared to 10/17/2019 revealing a stable spiculated RUL nodule/mass with similar mediastinal adenopathy. Questionable right hilar adenopathy with diminished assessment due to lack of IV contrast.  Advanced emphysema with severe pulmonary fibrosis. No new pulmonary nodules. CT abdomen and pelvis without contrast at [facility] on 1/9/2020 was compared to 10/17/2019 revealing mildly prominent aortocaval RP lymph nodes with position just below the level of the renal vein worrisome for potential lymphatic metastases. This is similar to 10/17/2019 but increased from 4/26/2019. Pneumobilia without discrete suspicious focal lesion in the liver. Wall thickening of the duodenum. Similar and stable renal lesions favoring cysts. He has had numerous endoscopies within the past year. About 10 months ago he was having an Endo EUS and had cardiac issues and was actually admitted to the intensive care unit. This area will just be monitored on follow-up scans. CBC today reveals a WBC of 7.32, Hgb 10.1, PLT of 212,000. His CBC is adequate to proceed with the next cycle of maintenance therapy. He seems to be tolerating the therapy very well. The last 3 to 4 days he has had significant amount of increased energy. He is breathing okay. CMP from 1/16/2020 reveals a creatinine of 1.7 with a GFR of 41.7 and a potassium of 4.6      PLAN  Continue maintenance Keytruda and Alimta every 3 weeks. 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       Potassium on 1/15/2020 was okay at 4.6    Orders Placed This Encounter   Procedures    Comprehensive Metabolic Panel    TSH without Reflex        Return in about 3 weeks (around 3/5/2020) for With .        Bailee Nicole PA-C  1:53 PM  2/13/2020

## 2020-02-13 ENCOUNTER — OFFICE VISIT (OUTPATIENT)
Dept: HEMATOLOGY | Age: 80
End: 2020-02-13
Payer: MEDICARE

## 2020-02-13 ENCOUNTER — HOSPITAL ENCOUNTER (OUTPATIENT)
Dept: INFUSION THERAPY | Age: 80
Discharge: HOME | End: 2020-02-13
Payer: MEDICARE

## 2020-02-13 VITALS
HEIGHT: 66 IN | DIASTOLIC BLOOD PRESSURE: 80 MMHG | BODY MASS INDEX: 26.2 KG/M2 | HEART RATE: 81 BPM | SYSTOLIC BLOOD PRESSURE: 122 MMHG | OXYGEN SATURATION: 95 % | WEIGHT: 163 LBS | TEMPERATURE: 97.7 F

## 2020-02-13 DIAGNOSIS — C80.1 MALIGNANT NEOPLASM (HCC): ICD-10-CM

## 2020-02-13 DIAGNOSIS — C34.11 MALIGNANT NEOPLASM OF UPPER LOBE, RIGHT BRONCHUS OR LUNG (HCC): Primary | ICD-10-CM

## 2020-02-13 DIAGNOSIS — D64.9 ANEMIA, UNSPECIFIED TYPE: ICD-10-CM

## 2020-02-13 DIAGNOSIS — C34.90 NON-SMALL CELL LUNG CANCER, UNSPECIFIED LATERALITY (HCC): ICD-10-CM

## 2020-02-13 DIAGNOSIS — N18.4 CHRONIC KIDNEY DISEASE, STAGE IV (SEVERE) (HCC): ICD-10-CM

## 2020-02-13 DIAGNOSIS — D64.9 FATIGUE ASSOCIATED WITH ANEMIA: ICD-10-CM

## 2020-02-13 PROCEDURE — 6360000002 HC RX W HCPCS: Performed by: PHYSICIAN ASSISTANT

## 2020-02-13 PROCEDURE — 85025 COMPLETE CBC W/AUTO DIFF WBC: CPT

## 2020-02-13 PROCEDURE — 1036F TOBACCO NON-USER: CPT | Performed by: PHYSICIAN ASSISTANT

## 2020-02-13 PROCEDURE — 96372 THER/PROPH/DIAG INJ SC/IM: CPT

## 2020-02-13 PROCEDURE — G8484 FLU IMMUNIZE NO ADMIN: HCPCS | Performed by: PHYSICIAN ASSISTANT

## 2020-02-13 PROCEDURE — 2580000003 HC RX 258: Performed by: INTERNAL MEDICINE

## 2020-02-13 PROCEDURE — 96375 TX/PRO/DX INJ NEW DRUG ADDON: CPT

## 2020-02-13 PROCEDURE — 96413 CHEMO IV INFUSION 1 HR: CPT

## 2020-02-13 PROCEDURE — 1123F ACP DISCUSS/DSCN MKR DOCD: CPT | Performed by: PHYSICIAN ASSISTANT

## 2020-02-13 PROCEDURE — 96417 CHEMO IV INFUS EACH ADDL SEQ: CPT

## 2020-02-13 PROCEDURE — 4040F PNEUMOC VAC/ADMIN/RCVD: CPT | Performed by: PHYSICIAN ASSISTANT

## 2020-02-13 PROCEDURE — G8417 CALC BMI ABV UP PARAM F/U: HCPCS | Performed by: PHYSICIAN ASSISTANT

## 2020-02-13 PROCEDURE — G8428 CUR MEDS NOT DOCUMENT: HCPCS | Performed by: PHYSICIAN ASSISTANT

## 2020-02-13 PROCEDURE — 6360000002 HC RX W HCPCS: Performed by: INTERNAL MEDICINE

## 2020-02-13 PROCEDURE — 99213 OFFICE O/P EST LOW 20 MIN: CPT | Performed by: PHYSICIAN ASSISTANT

## 2020-02-13 RX ORDER — HEPARIN SODIUM (PORCINE) LOCK FLUSH IV SOLN 100 UNIT/ML 100 UNIT/ML
500 SOLUTION INTRAVENOUS PRN
Status: CANCELLED
Start: 2020-02-13

## 2020-02-13 RX ORDER — CYANOCOBALAMIN 1000 UG/ML
1000 INJECTION INTRAMUSCULAR; SUBCUTANEOUS ONCE
Status: COMPLETED | OUTPATIENT
Start: 2020-02-13 | End: 2020-02-13

## 2020-02-13 RX ORDER — SODIUM CHLORIDE 9 MG/ML
20 INJECTION, SOLUTION INTRAVENOUS ONCE
Status: CANCELLED | OUTPATIENT
Start: 2020-02-13

## 2020-02-13 RX ORDER — DIPHENHYDRAMINE HYDROCHLORIDE 50 MG/ML
50 INJECTION INTRAMUSCULAR; INTRAVENOUS PRN
Status: CANCELLED | OUTPATIENT
Start: 2020-02-13

## 2020-02-13 RX ORDER — SODIUM CHLORIDE 0.9 % (FLUSH) 0.9 %
5 SYRINGE (ML) INJECTION PRN
Status: CANCELLED | OUTPATIENT
Start: 2020-02-13

## 2020-02-13 RX ORDER — PALONOSETRON 0.05 MG/ML
0.25 INJECTION, SOLUTION INTRAVENOUS ONCE
Status: COMPLETED | OUTPATIENT
Start: 2020-02-13 | End: 2020-02-13

## 2020-02-13 RX ORDER — HEPARIN SODIUM (PORCINE) LOCK FLUSH IV SOLN 100 UNIT/ML 100 UNIT/ML
500 SOLUTION INTRAVENOUS PRN
Status: DISCONTINUED | OUTPATIENT
Start: 2020-02-13 | End: 2020-02-15 | Stop reason: HOSPADM

## 2020-02-13 RX ORDER — SODIUM CHLORIDE 0.9 % (FLUSH) 0.9 %
10 SYRINGE (ML) INJECTION PRN
Status: DISCONTINUED | OUTPATIENT
Start: 2020-02-13 | End: 2020-02-14 | Stop reason: HOSPADM

## 2020-02-13 RX ORDER — METHYLPREDNISOLONE SODIUM SUCCINATE 125 MG/2ML
125 INJECTION, POWDER, LYOPHILIZED, FOR SOLUTION INTRAMUSCULAR; INTRAVENOUS PRN
Status: CANCELLED | OUTPATIENT
Start: 2020-02-13

## 2020-02-13 RX ORDER — SODIUM CHLORIDE 0.9 % (FLUSH) 0.9 %
10 SYRINGE (ML) INJECTION PRN
Status: CANCELLED | OUTPATIENT
Start: 2020-02-13

## 2020-02-13 RX ORDER — PALONOSETRON 0.05 MG/ML
0.25 INJECTION, SOLUTION INTRAVENOUS ONCE
Status: CANCELLED | OUTPATIENT
Start: 2020-02-13

## 2020-02-13 RX ORDER — CYANOCOBALAMIN 1000 UG/ML
1000 INJECTION INTRAMUSCULAR; SUBCUTANEOUS ONCE
Status: CANCELLED | OUTPATIENT
Start: 2020-02-13

## 2020-02-13 RX ORDER — EPINEPHRINE 1 MG/ML
0.3 INJECTION, SOLUTION, CONCENTRATE INTRAVENOUS PRN
Status: CANCELLED | OUTPATIENT
Start: 2020-02-13

## 2020-02-13 RX ORDER — DEXAMETHASONE SODIUM PHOSPHATE 10 MG/ML
10 INJECTION, SOLUTION INTRAMUSCULAR; INTRAVENOUS ONCE
Status: COMPLETED | OUTPATIENT
Start: 2020-02-13 | End: 2020-02-13

## 2020-02-13 RX ADMIN — HEPARIN 500 UNITS: 100 SYRINGE at 11:23

## 2020-02-13 RX ADMIN — EPOETIN ALFA-EPBX 20000 UNITS: 10000 INJECTION, SOLUTION INTRAVENOUS; SUBCUTANEOUS at 11:27

## 2020-02-13 RX ADMIN — DEXAMETHASONE SODIUM PHOSPHATE 10 MG: 10 INJECTION, SOLUTION INTRAMUSCULAR; INTRAVENOUS at 10:27

## 2020-02-13 RX ADMIN — CYANOCOBALAMIN 1000 MCG: 1000 INJECTION, SOLUTION INTRAMUSCULAR; SUBCUTANEOUS at 11:23

## 2020-02-13 RX ADMIN — SODIUM CHLORIDE, PRESERVATIVE FREE 10 ML: 5 INJECTION INTRAVENOUS at 11:23

## 2020-02-13 RX ADMIN — PALONOSETRON HYDROCHLORIDE 0.25 MG: 0.25 INJECTION, SOLUTION INTRAVENOUS at 10:27

## 2020-02-13 RX ADMIN — SODIUM CHLORIDE 200 MG: 9 INJECTION, SOLUTION INTRAVENOUS at 10:34

## 2020-02-13 RX ADMIN — SODIUM CHLORIDE 520 MG: 9 INJECTION, SOLUTION INTRAVENOUS at 11:11

## 2020-02-13 ASSESSMENT — ENCOUNTER SYMPTOMS
WHEEZING: 0
NAUSEA: 0
EYE ITCHING: 0
EYE DISCHARGE: 0
VOMITING: 0
BACK PAIN: 0
ABDOMINAL DISTENTION: 0
SHORTNESS OF BREATH: 1
VOICE CHANGE: 0
DIARRHEA: 0
COUGH: 0
COLOR CHANGE: 0
BLOOD IN STOOL: 0
SORE THROAT: 0
CONSTIPATION: 1
TROUBLE SWALLOWING: 0
ABDOMINAL PAIN: 0

## 2020-02-20 ENCOUNTER — HOSPITAL ENCOUNTER (OUTPATIENT)
Dept: INFUSION THERAPY | Age: 80
Discharge: HOME | End: 2020-02-20
Payer: MEDICARE

## 2020-02-20 VITALS
OXYGEN SATURATION: 96 % | DIASTOLIC BLOOD PRESSURE: 68 MMHG | HEIGHT: 66 IN | SYSTOLIC BLOOD PRESSURE: 120 MMHG | WEIGHT: 161.4 LBS | HEART RATE: 91 BPM | BODY MASS INDEX: 25.94 KG/M2

## 2020-02-20 DIAGNOSIS — D64.9 ANEMIA, UNSPECIFIED TYPE: ICD-10-CM

## 2020-02-20 DIAGNOSIS — C34.90 NON-SMALL CELL LUNG CANCER, UNSPECIFIED LATERALITY (HCC): ICD-10-CM

## 2020-02-20 DIAGNOSIS — N18.4 CHRONIC KIDNEY DISEASE, STAGE IV (SEVERE) (HCC): Primary | ICD-10-CM

## 2020-02-20 DIAGNOSIS — C80.1 MALIGNANT NEOPLASM (HCC): ICD-10-CM

## 2020-02-20 PROCEDURE — 6360000002 HC RX W HCPCS: Performed by: PHYSICIAN ASSISTANT

## 2020-02-20 PROCEDURE — 96372 THER/PROPH/DIAG INJ SC/IM: CPT

## 2020-02-20 PROCEDURE — 85025 COMPLETE CBC W/AUTO DIFF WBC: CPT

## 2020-02-20 RX ADMIN — EPOETIN ALFA-EPBX 20000 UNITS: 10000 INJECTION, SOLUTION INTRAVENOUS; SUBCUTANEOUS at 15:54

## 2020-02-25 ENCOUNTER — OFFICE VISIT (OUTPATIENT)
Dept: CARDIOLOGY | Age: 80
End: 2020-02-25
Payer: MEDICARE

## 2020-02-25 VITALS
WEIGHT: 160 LBS | HEIGHT: 64 IN | HEART RATE: 82 BPM | OXYGEN SATURATION: 92 % | DIASTOLIC BLOOD PRESSURE: 62 MMHG | SYSTOLIC BLOOD PRESSURE: 106 MMHG | BODY MASS INDEX: 27.31 KG/M2

## 2020-02-25 PROCEDURE — G8484 FLU IMMUNIZE NO ADMIN: HCPCS | Performed by: NURSE PRACTITIONER

## 2020-02-25 PROCEDURE — 4040F PNEUMOC VAC/ADMIN/RCVD: CPT | Performed by: NURSE PRACTITIONER

## 2020-02-25 PROCEDURE — G8427 DOCREV CUR MEDS BY ELIG CLIN: HCPCS | Performed by: NURSE PRACTITIONER

## 2020-02-25 PROCEDURE — 99214 OFFICE O/P EST MOD 30 MIN: CPT | Performed by: NURSE PRACTITIONER

## 2020-02-25 PROCEDURE — G8417 CALC BMI ABV UP PARAM F/U: HCPCS | Performed by: NURSE PRACTITIONER

## 2020-02-25 PROCEDURE — 1123F ACP DISCUSS/DSCN MKR DOCD: CPT | Performed by: NURSE PRACTITIONER

## 2020-02-25 PROCEDURE — 1036F TOBACCO NON-USER: CPT | Performed by: NURSE PRACTITIONER

## 2020-02-25 NOTE — PROGRESS NOTES
([facility] 10/02/2017    NSVT (nonsustained ventricular tachycardia) ([facility] 10/02/2017     Current Outpatient Medications   Medication Sig Dispense Refill    senna-docusate (SENOKOT S) 8.6-50 MG per tablet Take 2 tablets by mouth 2 times daily 120 tablet 1    spironolactone (ALDACTONE) 25 MG tablet Take 25 mg by mouth daily      folic acid (FOLVITE) 1 MG tablet Take 1 tablet by mouth daily 60 tablet 3    dexamethasone (DECADRON) 4 MG tablet Take 1 tablet by mouth See Admin Instructions TAKE 4MG BID ON DAY BEFORE CHEMO, THE DAY OF CHEMO, AND THE DAY AFTER EVERY 21 DAYS 24 tablet 2    flecainide (TAMBOCOR) 50 MG tablet Take 50 mg by mouth 2 times daily      sodium bicarbonate 650 MG tablet Take 650 mg by mouth 2 times daily      pantoprazole (PROTONIX) 40 MG tablet Take 40 mg by mouth daily      tamsulosin (FLOMAX) 0.4 MG capsule Take 0.4 mg by mouth daily      metoprolol tartrate (LOPRESSOR) 25 MG tablet Take 0.5 tablets by mouth 2 times daily 60 tablet 0    Multiple Vitamin (MULTI VITAMIN DAILY PO) Take by mouth daily        No current facility-administered medications for this visit. Allergies: Penicillins  Past Medical History:   Diagnosis Date    Abnormal weight loss     Anemia     Anemia, unspecified     Blind left eye     Cardiomyopathy ([facility]     with compensated CHF    Cellulitis     Cellulitis of unspecified part of limb     [facility] disease, stage IV (severe) ([facility]     Dr. Leida Magallon COPD (chronic obstructive pulmonary disease) ([facility]     Duodenal ulcer     Essential hypertension 10/2/2017    Eye injury     at age 12 from penetrating injury    Gout     HTN (hypertension)     Hydronephrosis     Hydronephrosis, left     Liver abscess     resolved    Lung nodule     Malignant neoplasm ([facility]     Malignant neoplasm of upper lobe, right bronchus or lung (HCC)     MGUS (monoclonal gammopathy of unknown significance)     secondary to an IgG kappa.   IgG level in 2,000 range    Monoclonal gammopathy     NICM (nonischemic cardiomyopathy) (HCC)     Non-small cell lung cancer ([facility] 2019    Non-sustained ventricular tachycardia (HCC)     NSVT (nonsustained ventricular tachycardia) (HCC)     Osteoarthritis     Other fatigue     Other iron deficiency anemias     Secondary malignant neoplasm of other digestive organs ([facility]     Weight loss      Past Surgical History:   Procedure Laterality Date    BRONCHOSCOPY  2018    dx of adenocarcinoma RUL  Dr. Steen Client CATH LAB PROCEDURE      ENDOSCOPY, COLON, DIAGNOSTIC  2019    aborted d/t vtach    EYE SURGERY Left     EYE SURGERY  1964    FOOT SURGERY      removal of joint from right toe from drilling accident, [facility]  10/29/2003    with resection  Mitul-en-Y proecedure  DR. Mcfarlane    TUNNELED VENOUS PORT PLACEMENT      UPPER GASTROINTESTINAL ENDOSCOPY N/A 3/13/2019    EGD W/EUS FNA performed by Clay Estrada MD at [facility]     Family History   Problem Relation Age of Onset    Osteoporosis Mother     High Blood Pressure Mother     Asthma Mother     Bleeding Prob Father     Cancer Father         leukemia    Asthma Brother      Social History     Tobacco Use    Smoking status: Former Smoker     Packs/day: 2.00     Years: 40.00     Pack years: 80.00     Types: Cigarettes     Last attempt to quit: 10/29/2003     Years since quittin.3    Smokeless tobacco: Never Used   Substance Use Topics    Alcohol use: No          Review of Systems:    General:      Complaint / Symptom Yes / No / Description if Yes       Fatigue No   Weight gain N/A   Insomnia N/A       Respiratory:        Complaint / Symptom Yes / No / Description if Yes       Cough No   Horseness N/A       Cardiovascular:    Complaint / Symptom Yes / No / Description if Yes       Chest Pain No   Shortness of Air / Orthopnea Yes: Baseline no change Presyncope / Syncope No   Palpitations No         Objective:    /62   Pulse 82   Ht 5' 4\" (1.626 m)   Wt 160 lb (72.6 kg)   SpO2 92%   BMI 27.46 kg/m²     GENERAL - well developed and well nourished, conversant  HEENT -  PERRLA, Hearing appears normal, gentleman lids are normal.  External inspection of ears and nose appear normal.  NECK - no thyromegaly, no JVD, trachea is in the midline  CARDIOVASCULAR - PMI is in the mid line clavicular position, Normal S1 and S2 with no systolic murmur. No S3 or S4    PULMONARY - no respiratory distress. No wheezes or rales. Lungs are clear to ausculation, normal respiratory effort. ABDOMEN  - soft, non tender, no rebound  MUSCULOSKELETAL  - range of motion of the upper and lower extermites appears normal and equal and is without pain   EXTREMITIES - no significant edema   NEUROLOGIC - gait and station are normal  SKIN - turgor is normal, can warm and dry. PSYCHIATRIC - normal mood and affect, alert and orientated x 3,      ASSESSMENT:    ALL THE CARDIOLOGY PROBLEMS ARE LISTED ABOVE; HOWEVER, THE FOLLOWING SPECIFIC CARDIAC PROBLEMS / CONDITIONS WERE ADDRESSED AND TREATED DURING THE OFFICE VISIT TODAY:                                                                                            MEDICAL DECISION MAKING             Cardiac Specific Problem / Diagnosis  Discussion and Data Reviewed Diagnostic Procedures Ordered Management Options Selected           1. CAD  Stable   Review and summation of old records:    No chest pain. 2005  Cath ? results  12/1/2009  EF 50%  12/29/2012  EF 60%  8/30/2016  Echo EF 50%  3/13/19 during EGD, went into VT, given amiodarone, intubated,   AUC indication 57, AUC score 8  3/14/2019  extubated   3/15/2019  Echo EF 50%  3/15/19  Cath  Mild CAD, diffuse distal disease in a very small terminal LCX, normal LVFX No Continue current medications:     Yes           2. Nonsustained ventricular tachycardia  Stable   No recurrence.

## 2020-02-27 ENCOUNTER — HOSPITAL ENCOUNTER (OUTPATIENT)
Dept: INFUSION THERAPY | Age: 80
Discharge: HOME | End: 2020-02-27
Payer: MEDICARE

## 2020-02-27 VITALS
HEIGHT: 64 IN | BODY MASS INDEX: 27.2 KG/M2 | DIASTOLIC BLOOD PRESSURE: 72 MMHG | WEIGHT: 159.3 LBS | HEART RATE: 86 BPM | SYSTOLIC BLOOD PRESSURE: 120 MMHG | OXYGEN SATURATION: 96 %

## 2020-02-27 DIAGNOSIS — N18.4 CHRONIC KIDNEY DISEASE, STAGE IV (SEVERE) (HCC): Primary | ICD-10-CM

## 2020-02-27 DIAGNOSIS — D64.9 ANEMIA, UNSPECIFIED TYPE: ICD-10-CM

## 2020-02-27 DIAGNOSIS — C34.90 NON-SMALL CELL LUNG CANCER, UNSPECIFIED LATERALITY (HCC): ICD-10-CM

## 2020-02-27 DIAGNOSIS — C80.1 MALIGNANT NEOPLASM (HCC): ICD-10-CM

## 2020-02-27 PROCEDURE — 85025 COMPLETE CBC W/AUTO DIFF WBC: CPT

## 2020-02-27 PROCEDURE — 96372 THER/PROPH/DIAG INJ SC/IM: CPT

## 2020-02-27 PROCEDURE — 6360000002 HC RX W HCPCS: Performed by: PHYSICIAN ASSISTANT

## 2020-02-27 RX ORDER — CEPHALEXIN 500 MG/1
500 CAPSULE ORAL 3 TIMES DAILY
Qty: 30 CAPSULE | Refills: 0 | Status: SHIPPED | OUTPATIENT
Start: 2020-02-27 | End: 2020-03-08

## 2020-02-27 RX ADMIN — EPOETIN ALFA-EPBX 20000 UNITS: 10000 INJECTION, SOLUTION INTRAVENOUS; SUBCUTANEOUS at 15:59

## 2020-02-27 NOTE — PROGRESS NOTES
Patient presents for retacrit as indicated. Reports wound to left upper arm where he \"removed a bandaid and the skin came off with it\". Skin infections noted to left upper arm at site. Rx Keflex per Rashi ROSALES phoned to preferred pharmacy.

## 2020-03-05 ENCOUNTER — OFFICE VISIT (OUTPATIENT)
Dept: HEMATOLOGY | Age: 80
End: 2020-03-05
Payer: MEDICARE

## 2020-03-05 ENCOUNTER — HOSPITAL ENCOUNTER (OUTPATIENT)
Dept: INFUSION THERAPY | Age: 80
Discharge: HOME | End: 2020-03-05
Payer: MEDICARE

## 2020-03-05 VITALS
BODY MASS INDEX: 27.49 KG/M2 | SYSTOLIC BLOOD PRESSURE: 120 MMHG | HEART RATE: 68 BPM | OXYGEN SATURATION: 94 % | DIASTOLIC BLOOD PRESSURE: 78 MMHG | WEIGHT: 161 LBS | HEIGHT: 64 IN | TEMPERATURE: 97.5 F

## 2020-03-05 DIAGNOSIS — R53.83 FATIGUE DUE TO TREATMENT: ICD-10-CM

## 2020-03-05 DIAGNOSIS — C34.11 MALIGNANT NEOPLASM OF UPPER LOBE, RIGHT BRONCHUS OR LUNG (HCC): Primary | ICD-10-CM

## 2020-03-05 DIAGNOSIS — C34.90 NON-SMALL CELL LUNG CANCER, UNSPECIFIED LATERALITY (HCC): ICD-10-CM

## 2020-03-05 DIAGNOSIS — N18.4 CHRONIC KIDNEY DISEASE, STAGE IV (SEVERE) (HCC): ICD-10-CM

## 2020-03-05 DIAGNOSIS — C80.1 MALIGNANT NEOPLASM (HCC): ICD-10-CM

## 2020-03-05 PROCEDURE — 96417 CHEMO IV INFUS EACH ADDL SEQ: CPT

## 2020-03-05 PROCEDURE — 2580000003 HC RX 258: Performed by: INTERNAL MEDICINE

## 2020-03-05 PROCEDURE — 1123F ACP DISCUSS/DSCN MKR DOCD: CPT | Performed by: INTERNAL MEDICINE

## 2020-03-05 PROCEDURE — 1036F TOBACCO NON-USER: CPT | Performed by: INTERNAL MEDICINE

## 2020-03-05 PROCEDURE — 6360000002 HC RX W HCPCS: Performed by: INTERNAL MEDICINE

## 2020-03-05 PROCEDURE — G8427 DOCREV CUR MEDS BY ELIG CLIN: HCPCS | Performed by: INTERNAL MEDICINE

## 2020-03-05 PROCEDURE — 96375 TX/PRO/DX INJ NEW DRUG ADDON: CPT

## 2020-03-05 PROCEDURE — 99214 OFFICE O/P EST MOD 30 MIN: CPT | Performed by: INTERNAL MEDICINE

## 2020-03-05 PROCEDURE — 96372 THER/PROPH/DIAG INJ SC/IM: CPT

## 2020-03-05 PROCEDURE — 96413 CHEMO IV INFUSION 1 HR: CPT

## 2020-03-05 PROCEDURE — 85025 COMPLETE CBC W/AUTO DIFF WBC: CPT

## 2020-03-05 PROCEDURE — 36415 COLL VENOUS BLD VENIPUNCTURE: CPT

## 2020-03-05 PROCEDURE — G8417 CALC BMI ABV UP PARAM F/U: HCPCS | Performed by: INTERNAL MEDICINE

## 2020-03-05 PROCEDURE — 6360000002 HC RX W HCPCS: Performed by: PHYSICIAN ASSISTANT

## 2020-03-05 PROCEDURE — 84443 ASSAY THYROID STIM HORMONE: CPT

## 2020-03-05 PROCEDURE — 80053 COMPREHEN METABOLIC PANEL: CPT

## 2020-03-05 PROCEDURE — G8484 FLU IMMUNIZE NO ADMIN: HCPCS | Performed by: INTERNAL MEDICINE

## 2020-03-05 PROCEDURE — 4040F PNEUMOC VAC/ADMIN/RCVD: CPT | Performed by: INTERNAL MEDICINE

## 2020-03-05 RX ORDER — HEPARIN SODIUM (PORCINE) LOCK FLUSH IV SOLN 100 UNIT/ML 100 UNIT/ML
500 SOLUTION INTRAVENOUS PRN
Status: CANCELLED | OUTPATIENT
Start: 2020-03-05

## 2020-03-05 RX ORDER — SODIUM CHLORIDE 0.9 % (FLUSH) 0.9 %
5 SYRINGE (ML) INJECTION PRN
Status: CANCELLED | OUTPATIENT
Start: 2020-03-05

## 2020-03-05 RX ORDER — SODIUM CHLORIDE 0.9 % (FLUSH) 0.9 %
10 SYRINGE (ML) INJECTION PRN
Status: CANCELLED | OUTPATIENT
Start: 2020-03-05

## 2020-03-05 RX ORDER — PALONOSETRON 0.05 MG/ML
0.25 INJECTION, SOLUTION INTRAVENOUS ONCE
Status: CANCELLED | OUTPATIENT
Start: 2020-03-05

## 2020-03-05 RX ORDER — METHYLPREDNISOLONE SODIUM SUCCINATE 125 MG/2ML
125 INJECTION, POWDER, LYOPHILIZED, FOR SOLUTION INTRAMUSCULAR; INTRAVENOUS PRN
Status: CANCELLED | OUTPATIENT
Start: 2020-03-05

## 2020-03-05 RX ORDER — DEXAMETHASONE SODIUM PHOSPHATE 10 MG/ML
10 INJECTION, SOLUTION INTRAMUSCULAR; INTRAVENOUS ONCE
Status: COMPLETED | OUTPATIENT
Start: 2020-03-05 | End: 2020-03-05

## 2020-03-05 RX ORDER — HEPARIN SODIUM (PORCINE) LOCK FLUSH IV SOLN 100 UNIT/ML 100 UNIT/ML
500 SOLUTION INTRAVENOUS PRN
Status: DISCONTINUED | OUTPATIENT
Start: 2020-03-05 | End: 2020-03-07 | Stop reason: HOSPADM

## 2020-03-05 RX ORDER — DIPHENHYDRAMINE HYDROCHLORIDE 50 MG/ML
50 INJECTION INTRAMUSCULAR; INTRAVENOUS PRN
Status: CANCELLED | OUTPATIENT
Start: 2020-03-05

## 2020-03-05 RX ORDER — CYANOCOBALAMIN 1000 UG/ML
1000 INJECTION INTRAMUSCULAR; SUBCUTANEOUS ONCE
Status: COMPLETED | OUTPATIENT
Start: 2020-03-05 | End: 2020-03-05

## 2020-03-05 RX ORDER — PALONOSETRON 0.05 MG/ML
0.25 INJECTION, SOLUTION INTRAVENOUS ONCE
Status: COMPLETED | OUTPATIENT
Start: 2020-03-05 | End: 2020-03-05

## 2020-03-05 RX ORDER — CYANOCOBALAMIN 1000 UG/ML
1000 INJECTION INTRAMUSCULAR; SUBCUTANEOUS ONCE
Status: CANCELLED | OUTPATIENT
Start: 2020-03-05

## 2020-03-05 RX ORDER — EPINEPHRINE 1 MG/ML
0.3 INJECTION, SOLUTION, CONCENTRATE INTRAVENOUS PRN
Status: CANCELLED | OUTPATIENT
Start: 2020-03-05

## 2020-03-05 RX ORDER — SODIUM CHLORIDE 0.9 % (FLUSH) 0.9 %
10 SYRINGE (ML) INJECTION PRN
Status: DISCONTINUED | OUTPATIENT
Start: 2020-03-05 | End: 2020-03-06 | Stop reason: HOSPADM

## 2020-03-05 RX ORDER — SODIUM CHLORIDE 9 MG/ML
20 INJECTION, SOLUTION INTRAVENOUS ONCE
Status: CANCELLED | OUTPATIENT
Start: 2020-03-05

## 2020-03-05 RX ADMIN — SODIUM CHLORIDE, PRESERVATIVE FREE 10 ML: 5 INJECTION INTRAVENOUS at 12:05

## 2020-03-05 RX ADMIN — CYANOCOBALAMIN 1000 MCG: 1000 INJECTION, SOLUTION INTRAMUSCULAR; SUBCUTANEOUS at 12:05

## 2020-03-05 RX ADMIN — EPOETIN ALFA-EPBX 20000 UNITS: 10000 INJECTION, SOLUTION INTRAVENOUS; SUBCUTANEOUS at 12:12

## 2020-03-05 RX ADMIN — SODIUM CHLORIDE 200 MG: 9 INJECTION, SOLUTION INTRAVENOUS at 11:07

## 2020-03-05 RX ADMIN — DEXAMETHASONE SODIUM PHOSPHATE 10 MG: 10 INJECTION, SOLUTION INTRAMUSCULAR; INTRAVENOUS at 10:51

## 2020-03-05 RX ADMIN — SODIUM CHLORIDE 520 MG: 9 INJECTION, SOLUTION INTRAVENOUS at 11:45

## 2020-03-05 RX ADMIN — PALONOSETRON 0.25 MG: 0.05 INJECTION, SOLUTION INTRAVENOUS at 10:51

## 2020-03-05 RX ADMIN — HEPARIN 500 UNITS: 100 SYRINGE at 12:05

## 2020-03-05 NOTE — PROGRESS NOTES
Patient:  Penny Cruz  YOB: 1940  Date of Service: 3/5/2020  MRN: [id number]    Primary Care Physician: Sukhwinder Riggs MD    Chief Complaint   Patient presents with    Lung Cancer       Patient Seen, Chart, Consults notes, Labs, Radiology studies reviewed. Subjective:  Penny Cruz is a 70 y.o.  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 · CKD associated anemia, on Procrit  · BPH on Flomax    He completed 4 cycles of combination chemotherapy with carboplatin, Keytruda, Alimta.    Sophia is now  receiving  maintenance therapy with Keytruda and Alimta every 3 weeks.      He has been tolerating the Keytruda and Alimta well. His serology-labs have been stable. He has anemia related to chronic renal insufficiency and he is responded much better to Procrit. Energy level overall has been improving. He is breathing better.     Prabhjot has benign prostatic hypertrophy (BPH)  that is stable on Flomax. He takes Tambocor, metoprolol without any new cardiac issues. Protonix continues without any new exacerbations of GERD. Lower extremity edema overall has actually improved some.     Prabhjot returns today for cycle #15 of Keytruda/Alimta. TUMOR HISTORY: Adenocarcinoma on the RUL of the lung 11/27/2018  Javier Walter had CT scans performed for new onset of weight loss of 13 pounds over the previous year , associated with increased fatigue. He denied respiratory symptoms of shortness of air, cough or productive sputum.     A CT scan of the chest on 9/12/2018 had been ordered by Dr. Charlette Love due to weight loss and identified a new lobulated right upper lobe 5.7 cm mass that extended back to the right hilum.    Numerous mediastinal lymph nodes ranging in size from mm to 1.3 cm and a subcarnial lymph node that measured 1.5 x 1.5 x 3.5 cm.     A CT scan of the abdomen and pelvis with contrast on 9/12/2018 documented a 4.9 x 4 x 4 cm soft tissue mass with peripheral calcification immediately adjacent to the gallbladder in the head of the pancreas area.     An MRI of the abdomen and pelvis W & WO on 10/11/2018 identified in the 4.1 x 3.4 cm soft tissue mass in the subhepatic space, abutting the second portion of the duodenum with mild displacement of the duodenum. There does not appear to be involvement of the pancreas, and the pancreas appears within normal limits. Differential diagnoses include a desmoid tumor, less likely leiomyoma of the wall of the duodenum or malignancy. Dr. Eduardo Silva requested a CT-guided biopsy of the right upper lobe mass. Dr. Lisa Alvarez, the radiologist, evaluated the area with a repeat CT scan of the chest on 10/11/2018. He spoke with Dr. Eduardo Silva indicating that he would not be able to perform the biopsy because the tumor was not accessible, it was under the scapula , which blocked access and therefore the biopsy procedure was canceled.     On the CT scan of the chest on 10/11/2018 measurements of the RUL lung mass was 5.7 x 3.2 cm with irregular margins suspicious for a primary lung neoplasm. Soft tissue associated with the tumor appeared to extend into the bronchus supplying this portion of the RUL of the lung. Dr. Lisa Alvarez indicated that the mass had an endobronchial component and should be easily assessable via bronchoscopy.     The chest CT also included a portion of the upper abdomen where a soft tissue mass was described in an addendum report. In the subhepatic space measuring 4.0 x 3.9 cm, displacing the second portion of the duodenum medially. A referral was made to Dr. Kofi Allan for consideration for bronchoscopy for biopsy.     On 10/24/2018, Dr. Daphne Bobo performed a bronchoscopy with EBUS guided biopsy of a 3 cm subcarinal lymph node along with bronchoalveolar lavage of the posterior segment of the RUL of the lung.   The final pathology of the station 7 lymph node only revealed a background of small mature lymphocytes with clusters of histiocytes suggestive of granuloma. Negative for malignant cells. Kinyoun and GMS stains were negative for AFB and fungal organisms respectively. The bronchial washings were negative for malignant cells as well.      Mr. Valeria Mejia was referred to Dr. Mimi Jauregui at [facility] in [place], [place], for navigational bronchoscopy and biopsy under ultrasound.     On 11/27/2018 Dr. Mimi Jauregui performed a bronchoscopy, navigational protocol, endobronchial ultrasound and biopsy of the RUL of the lung mass along with multiple lymph node station Biopsies.   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 · All lymph nodes sampled were NEGATIVE for malignant cells including stations 10L, 4L, station 7, 10R, 4R.   · IHC studies were positive for CK7, TTF-1 and Napsin A.   · IHC studies on tumor cells were negative for CDX2, CK5/6, and p63   · Further lung profile molecular testing is pending     All of the above was discussed at length with Mr. Valeria Mejia at his 12/13/2018 clinic visit. He was agreeable for referral for consideration of resection of the lung lesion.      He is comfortable with and would like a referral to Dr. Sergey Jurado ([phone]) at [address], [address] [place] [address]. Logistics, however, are planning a big part in his decision making and he may decide to have surgery done in the [place] area.     [facility], referral will be made to Dr. Vinayak Alfaro.      CT chest with contrast 2/18/2019 at [facility] to 9/12/2018 revealed a 4.9 x 3.5 cm spiculated RUL lung mass which actually decreased in size from a prior value of 6.1 x 3.6 cm.    Subhepatic mass adjacent to the descending duodenum had increased in size significantly to 4.3 x 9 cm compared to 4.1 x 3.4 cm on the 10/11/18 MRI of the abdomen.     CT of the abdomen and pelvis without contrast on 3/20/2019 at [facility] was compared to outpatient CT data and pelvis on 9/12/2018 an MRI of the abdomen from 10/11/2018. A soft tissue mass was again encountered in the subhepatic space which abutted the distal stomach and proximal duodenum measuring 8.9 x 5.4 cm which has increased considerably from a prior measurement of 4.1 x 3.4 cm. Medial to the left renal hilum a 3.5 cm lobular mass causing some mild left-sided hydronephrosis.     Mr. Sarina Newman was referred to Dr. Ricky Atwood who saw him on 1/22/2019 anticipating plans for a curative resection.      Dr. Ariadne Caceres received a phone call on 2/20/2019 from Dr. Shaunna Dumont , indicating that the previously documented an abdominal pancreatic area mass had doubled in size and was causing compression into the left kidney/renal pelvis producing a hydroureter.      Dr. Shaunna Dumont arranged a referral to Dr. Gladys Sparks who will be placing a stent 3/8/2019.     Mr. Sarina Newman was scheduled to be seen by gastroenterology at [facility] on 3/13/2019 for EGD/EUS assessment and biopsy of the enlarging peripancreatic/duodenal area mass. With a decrease in the lung mass and increased size of the subhepatic mass-surgical resection was abandoned at present pending further evaluation of the subhepatic mass. Dr. Ariadne Caceres discussed treatment options with the unresectable lung mass and the per he pancreatic mass and recommended a combination of Keytruda, Alimta, carboplatin. He was subsequently admitted to [facility] 4/26 - 4/30/2019 with abdominal pain and melena. WBC fantasma was 0.75 with ANC 0.34. PLT did drop to 24,000. He did have duodenal ulcerations that were bleeding on endoscopy. He was stabilized but then readmitted from 5/8 - 5/10/2019 with recurrent melanoma. A repeat endoscopy was performed.  It was felt that had numerous endoscopies within the past year. While he was having an Endo EUS and had cardiac issues and was actually admitted to the intensive care unit. This area will just be monitored on follow-up scans.     TREATMENT SUMMARY  1. Keytruda, carboplatin, Alimta initiated 4/18/2019 to be given every 3 weeks for 4 cycles - 4th cycle 7/5/2019, then Keytruda + Alimta as maintenance to continue indefinitely until progression or intolerable side effects      #2 TUMOR HISTORY: Pancreatic mass diagnosed 10/29/03  Mr. Kemal Olsen presented in October 2003 with obstructive jaundice. A CT scan of the abdomen on 10/26/2003 documented a 3.2 x 4 x 4.2 cm mass in the head of the pancreas.      On 10/29/03 Dr. María Elena Bradfrod performed a resection of a portion of the head of the pancreas on [facility] along with a Mitul-en-Y procedure and a choledochojejunostomy for what was felt to be a pancreatic cancer. His pancreas was bypassed because of the findings. Pathology of the biopsies were negative for malignancy showing a fibrosed pancreas. Mr. Kemal Olsen was referred to Dr. Neelima Ferreira in [place].     Dr. Neelima Ferreira performed ERCP with an EUS and biopsy on 02/26/04   Pathology again was negative for cancer or malignancy. Routine periodic serologic and radiographic monitoring has not disclosed progression of the mass or development of new malignancy or increase in pancreatic tumor markers.      A CT scan of the abdomen and pelvis with contrast on 9/12/2018 documented a 4.9 x 4 x 4 cm soft tissue mass with peripheral calcification immediately adjacent to the gallbladder in the head of the pancreas area.     An MRI of the abdomen and pelvis W & WO on 10/11/2018 identified in the 4.1 x 3.4 cm soft tissue mass in the subhepatic space, abutting the second portion of the duodenum with mild displacement of the duodenum. There does not appear to be involvement of the pancreas, and the pancreas appears within normal limits.   Differential diagnoses include a desmoid tumor, less likely leiomyoma of the wall of the duodenum or malignancy.     CT of the abdomen and pelvis without contrast on 3/20/2019 at [facility] was compared to outpatient CT data and pelvis on 9/12/2018 an MRI of the abdomen from 10/11/2018. A soft tissue mass was again encountered in the subhepatic space which abutted the distal stomach and proximal duodenum measuring 8.9 x 5.4 cm which has increased considerably from a prior measurement of 4.1 x 3.4 cm. Medial to the left renal hilum a 3.5 cm lobular mass causing some mild left-sided hydronephrosis.     Mr. Yoni Iraheta was scheduled for an EGD/EUS by Dr. Yari Garcia as an outpatient procedure on 3/13/2019 for assessment and biopsy of the enlarging peripancreatic/duodenal area mass. Unfortunately, after initiation of the procedure, he began having ventricular tachycardia and the procedure had to be aborted. Claudia Wang was transferred to the ICU for cardiology evaluation and stabilization. He remained hospitalized until 3/18/2019 when he was medically cleared for discharge.     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 No emergent urologic intervention was warranted and he received monitoring of renal function. He had a creatinine level of 1.9 at discharge.     PET scan on 4/2/2019 identified a soft tissue mass in the RUL measuring 1.2 x 3.5 cm with extension to the right anterior hilum with an SUV of 10.1. Evidence of mediastinal and hilar lymphadenopathy, with low-level metabolic activity. Interval increase in the size of a large mass in the right upper abdomen inseparable from the duodenum measuring 10.7 x 6.9 cm compared to 5.4 x 8.9 cm on 2/20/2019 with an SUV of 23.6.  Slight increase in 2 small inseparable masses medial to the hilum of the left kidney measuring 2.2 and 2.6 cm and the other measuring 3.9 cm with a maximum SUV of 18.6.     Cycle #1 of palliative chemotherapy with Carboplatin, Alimta and Keytruda was initiated 4/18/19 which should also cover this process.     Abdominal/pelvic CT 4/26/19 was performed at [facility] due to abdominal pain and melena. The RUQ mass, within the duodenum decreased to 6.8 x 6.2 cm (was 10.7 x 6.9 cm on PET 4/2/19)     CT abdomen and pelvis without contrast at [facility] on 6/27/2019 was compared to 4/26/2019 revealed complete resolution of the previously identified. Duodenal/subhepatic space soft tissue mass. The previously identified heterogenous enhancing lesion in the left renal pelvis was much less prominent but there does continue to be some mild left caliectasis.     CT chest without contrast at [facility] on 1/9/2020 was compared to 10/17/2019 revealing:   · A stable spiculated RUL nodule/mass with similar mediastinal adenopathy. · Questionable right hilar adenopathy with diminished assessment due to lack of IV contrast.    · Advanced emphysema with severe pulmonary fibrosis. · No new pulmonary nodules.     CT abdomen and pelvis without contrast at [facility] on 1/9/2020 was compared to 10/17/2019 revealing:   · Mildly prominent aortocaval RP lymph nodes with position just below the level of the renal vein worrisome for potential lymphatic metastases. This is similar to 10/17/2019 but increased from 4/26/2019. · Pneumobilia without discrete suspicious focal lesion in the liver. · Wall thickening of the duodenum. · Similar and stable renal lesions favoring cysts.         He has had numerous endoscopies within the past year. About 10 months ago he was having an Endo EUS and had cardiac issues and was actually admitted to the intensive care unit. This area will just be monitored on follow-up scans.           #1 HEMATOLOGICAL HISTORY: IgG kappa MGUS- Dx: 02/23/06.   During the course of Mr. Ifeanyi Cee follow up, an abnormally elevated protein was noted on one occasion, which led to workup including quantitative immunoglobulins.      An elevated IgG kappa was encountered. This has remained stable in the 2500 range since early 2006.      A bone marrow biopsy and aspirate on 02/23/06 was negative with only 4% plasma cells.      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      24 hour urine for immunoelectrophoresis did not reveal an M-spike. Serology studies on 9/18/2018 documented an elevated, stable IgG of 2589 with an M spike of 0.7. Immunofixation continued to reveal IgG monoclonal protein with kappa light chain specificity.     #2 HEMATOLOGICAL HISTORY: Iron deficiency anemia, 9/18/2018  Heather Stephens had serology on 9/18/2018 that identified iron deficiency anemia. His lab work was obtained at [facility].  An iron level was 12, iron saturation 7%, ferritin 256  Folate >20, and vitamin B12 1044. Dr. Luis M Pryor placed Sophia on ferrous sulfate 325 mg daily on 9/25/2018 , but this failed to resolve the anemia.     An EGD by Dr. Mack Casey on 12/11/2018 documented a normal esophagus, normal stomach and a degree of duodenal deformity. Gastric biopsy was urease negative.     Colonoscopy by Dr. Mack Casey on 1/9/2019 documented a polyp at 80 cm. Pathology identified a tubular adenoma, negative for high-grade dysplasia. Feraheme administered.     Labs on 3/13/2019:  · Iron 25   · Iron saturation 22%   · TIBC 116   · Ferritin >2000   · Reticulocyte count 0.0578   ·    · Haptoglobin 341   · Folate >20   · Vitamin B12 945   · Erythropoietin 13     He presented to [facility] on 4/26/2019 with melena and abdominal discomfort. He was subsequently admitted     EGD per Dr. Bobby Castro on 4/29/2019 revealed  · LA grade (1 or more mucosal breaks greater than 5 mm, not extending between the tops of the 2 mucosal folds)? esophagitis with no bleeding found in the distal esophagus   · Stomach was normal, biopsies for H. pylori taken.    · Cardia and gastric fundus were normal on retroflexion   · One nonbleeding cratered duodenal Non-sustained ventricular tachycardia (HCC)     NSVT (nonsustained ventricular tachycardia) (HCC)     Osteoarthritis     Other fatigue     Other iron deficiency anemias     Secondary malignant neoplasm of other digestive organs ([facility] [address]     Weight loss         Past Surgical History:      Procedure Laterality Date    BRONCHOSCOPY  2018    dx of adenocarcinoma RUL  Dr. Glenda Bautista CATH LAB PROCEDURE      ENDOSCOPY, COLON, DIAGNOSTIC  2019    aborted d/t vtach    EYE SURGERY Left     EYE SURGERY  1964    FOOT SURGERY      removal of joint from right toe from drilling accident, [facility]  10/29/2003    with resection  Mitul-en-Y proecedure  DR. Mcfarlane    TUNNELED VENOUS PORT PLACEMENT      UPPER GASTROINTESTINAL ENDOSCOPY N/A 3/13/2019    EGD W/EUS FNA performed by Alethea Sicard, MD at [address] [place] [address] Endoscopy        Family History:      Problem Relation Age of Onset    Osteoporosis Mother     High Blood Pressure Mother     Asthma Mother     Bleeding Prob Father     Cancer Father         leukemia    Asthma Brother         Social History  Social History     Tobacco Use    Smoking status: Former Smoker     Packs/day: 2.00     Years: 40.00     Pack years: 80.00     Types: Cigarettes     Last attempt to quit: 10/29/2003     Years since quittin.3    Smokeless tobacco: Never Used   Substance Use Topics    Alcohol use: No    Drug use: No          Review of Systems:  Constitutional: Negative for chills, fatigue, fever or significant weight loss. HENT: Negative for congestion, hearing loss, nosebleeds or sore throat. Eyes: Negative for photophobia, pain, discharge, redness and visual disturbance. Respiratory: Negative for cough, shortness of breath, or wheezing. Cardiovascular: Negative for chest pain, palpitations or leg swelling.    Gastrointestinal: Negative for abdominal pain, blood in stool, BILIDIR, BILITOT, ALKPHOS in the last 72 hours. Invalid input(s): AMYLASE,  ALB  PT/INR: No results for input(s): PROTIME, INR in the last 72 hours. APTT: No results for input(s): APTT in the last 72 hours. BNP:  No results for input(s): BNP in the last 72 hours. Ionized Calcium:No results for input(s): IONCA in the last 72 hours. Magnesium:No results for input(s): MG in the last 72 hours. Phosphorus:No results for input(s): PHOS in the last 72 hours. HgbA1C: No results for input(s): LABA1C in the last 72 hours. Hepatic: No results for input(s): ALKPHOS, ALT, AST, PROT, BILITOT, BILIDIR, LABALBU in the last 72 hours. Lactic Acid: No results for input(s): LACTA in the last 72 hours. Troponin: No results for input(s): CKTOTAL, CKMB, TROPONINT in the last 72 hours. ABGs: No results for input(s): PH, PCO2, PO2, HCO3, O2SAT in the last 72 hours. CRP:  No results for input(s): CRP in the last 72 hours. Sed Rate:  No results for input(s): SEDRATE in the last 72 hours. Cultures:   No results for input(s): CULTURE in the last 72 hours. Radiology reports as per the Radiologist  Radiology: No results found. ASSESSMENT AND PLAN:  #1.    Myron Costa is a 70 y.o.  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 · CKD associated anemia, on Procrit  · BPH on Flomax    He completed 4 cycles of combination chemotherapy with carboplatin, Keytruda, Alimta.    Sophia is now  receiving  maintenance therapy with Keytruda and Alimta every 3 weeks.      He has been tolerating the Keytruda and Alimta well. His serology-labs have been stable. He has anemia related to chronic renal insufficiency and he is responded much better to Procrit. Energy level overall has been improving.  He is breathing better.     Prabhjot has benign prostatic hypertrophy (BPH)  that is stable on Flomax. He takes Tambocor, metoprolol without any new cardiac issues. Protonix continues without any new exacerbations of GERD. Lower extremity edema overall has actually improved some.     Prabhjot returns today for cycle #15 of Keytruda/Alimta. CBC today reveals a WBC of 8.05. Hgb is 10.2 with an MCV of 117 and platelet count of 157,326.     CBC today reveals a WBC of 7.32, Hgb 10.1, PLT of 212,000. His CBC is adequate to proceed with the next cycle of maintenance therapy. He seems to be tolerating the therapy very well. The last 3 to 4 days he has had significant amount of increased energy. He is breathing okay.      Serology on 2/13/2020 revealed:  CMP with glucose 174, creatinine 1.78, potassium 5.6, CO2 19, albumin 3.2, AST 46  TSH- 1.360     Physical examination does not reveal evidence of palpable lymphadenopathy in the supraclavicular infraclavicular or axillary areas. Lungs are relatively clear heart is regular abdomen is soft and benign. CT scans done on 1/9/2020 were stable    PLAN  Continue maintenance Keytruda/Alimta every 3 weeks. Weekly CBCs and Procrit           #2   Anemia of chronic renal failure and chemotherapy associated anemia     Hemoglobin is 10.1  Procrit 20,000 units weekly given as indicated to decrease transfusion requirements.       Potassium on 1/15/2020 was okay at 4.6       Dimple HINSON LPN am scribing for Isidro Cisse MD. Electronically signed by Hosea Sidhu LPN on 0/3/6135 at 3:93 PM       Claudette Harter was seen today for lung cancer. Diagnoses and all orders for this visit:    Non-small cell lung cancer, unspecified laterality ([facility] [address]  -     Comprehensive Metabolic Panel; Future  -     TSH without Reflex; Future    Fatigue due to treatment  -     Comprehensive Metabolic Panel; Future  -     TSH without Reflex;  Future        Orders Placed This Encounter   Procedures    Comprehensive Metabolic Panel     Standing Status:   Future     Standing Expiration

## 2020-03-12 ENCOUNTER — HOSPITAL ENCOUNTER (OUTPATIENT)
Dept: INFUSION THERAPY | Age: 80
Discharge: HOME | End: 2020-03-12
Payer: MEDICARE

## 2020-03-12 VITALS
HEART RATE: 81 BPM | OXYGEN SATURATION: 95 % | BODY MASS INDEX: 27.01 KG/M2 | WEIGHT: 158.2 LBS | DIASTOLIC BLOOD PRESSURE: 68 MMHG | TEMPERATURE: 97.5 F | SYSTOLIC BLOOD PRESSURE: 120 MMHG | HEIGHT: 64 IN

## 2020-03-12 DIAGNOSIS — C34.90 NON-SMALL CELL LUNG CANCER, UNSPECIFIED LATERALITY (HCC): ICD-10-CM

## 2020-03-12 DIAGNOSIS — D64.9 ANEMIA, UNSPECIFIED TYPE: ICD-10-CM

## 2020-03-12 PROCEDURE — 85025 COMPLETE CBC W/AUTO DIFF WBC: CPT

## 2020-03-19 ENCOUNTER — HOSPITAL ENCOUNTER (OUTPATIENT)
Dept: INFUSION THERAPY | Age: 80
Discharge: HOME | End: 2020-03-19
Payer: MEDICARE

## 2020-03-19 VITALS
HEART RATE: 88 BPM | WEIGHT: 156.4 LBS | DIASTOLIC BLOOD PRESSURE: 74 MMHG | OXYGEN SATURATION: 99 % | BODY MASS INDEX: 26.7 KG/M2 | SYSTOLIC BLOOD PRESSURE: 120 MMHG | TEMPERATURE: 97.8 F | HEIGHT: 64 IN

## 2020-03-19 DIAGNOSIS — C80.1 MALIGNANT NEOPLASM (HCC): ICD-10-CM

## 2020-03-19 DIAGNOSIS — N18.4 CHRONIC KIDNEY DISEASE, STAGE IV (SEVERE) (HCC): Primary | ICD-10-CM

## 2020-03-19 PROCEDURE — 6360000002 HC RX W HCPCS: Performed by: PHYSICIAN ASSISTANT

## 2020-03-19 PROCEDURE — 96372 THER/PROPH/DIAG INJ SC/IM: CPT

## 2020-03-19 PROCEDURE — 85025 COMPLETE CBC W/AUTO DIFF WBC: CPT

## 2020-03-19 RX ADMIN — EPOETIN ALFA-EPBX 20000 UNITS: 10000 INJECTION, SOLUTION INTRAVENOUS; SUBCUTANEOUS at 15:19

## 2020-03-25 RX ORDER — EPINEPHRINE 1 MG/ML
0.3 INJECTION, SOLUTION, CONCENTRATE INTRAVENOUS PRN
Status: CANCELLED | OUTPATIENT
Start: 2020-03-26

## 2020-03-25 RX ORDER — SODIUM CHLORIDE 9 MG/ML
20 INJECTION, SOLUTION INTRAVENOUS ONCE
Status: CANCELLED | OUTPATIENT
Start: 2020-03-26

## 2020-03-25 RX ORDER — DIPHENHYDRAMINE HYDROCHLORIDE 50 MG/ML
50 INJECTION INTRAMUSCULAR; INTRAVENOUS PRN
Status: CANCELLED | OUTPATIENT
Start: 2020-03-26

## 2020-03-25 RX ORDER — METHYLPREDNISOLONE SODIUM SUCCINATE 125 MG/2ML
125 INJECTION, POWDER, LYOPHILIZED, FOR SOLUTION INTRAMUSCULAR; INTRAVENOUS PRN
Status: CANCELLED | OUTPATIENT
Start: 2020-03-26

## 2020-03-25 RX ORDER — SODIUM CHLORIDE 0.9 % (FLUSH) 0.9 %
10 SYRINGE (ML) INJECTION PRN
Status: CANCELLED | OUTPATIENT
Start: 2020-03-26

## 2020-03-25 RX ORDER — HEPARIN SODIUM (PORCINE) LOCK FLUSH IV SOLN 100 UNIT/ML 100 UNIT/ML
500 SOLUTION INTRAVENOUS PRN
Status: CANCELLED | OUTPATIENT
Start: 2020-03-26

## 2020-03-25 RX ORDER — SODIUM CHLORIDE 0.9 % (FLUSH) 0.9 %
5 SYRINGE (ML) INJECTION PRN
Status: CANCELLED | OUTPATIENT
Start: 2020-03-26

## 2020-03-25 RX ORDER — CYANOCOBALAMIN 1000 UG/ML
1000 INJECTION INTRAMUSCULAR; SUBCUTANEOUS ONCE
Status: CANCELLED | OUTPATIENT
Start: 2020-03-26

## 2020-03-25 RX ORDER — PALONOSETRON 0.05 MG/ML
0.25 INJECTION, SOLUTION INTRAVENOUS ONCE
Status: CANCELLED | OUTPATIENT
Start: 2020-03-26

## 2020-03-26 ENCOUNTER — HOSPITAL ENCOUNTER (OUTPATIENT)
Dept: INFUSION THERAPY | Age: 80
Discharge: HOME | End: 2020-03-26
Payer: MEDICARE

## 2020-03-26 VITALS
OXYGEN SATURATION: 96 % | BODY MASS INDEX: 26.98 KG/M2 | SYSTOLIC BLOOD PRESSURE: 132 MMHG | WEIGHT: 158 LBS | HEIGHT: 64 IN | HEART RATE: 70 BPM | TEMPERATURE: 98.7 F | DIASTOLIC BLOOD PRESSURE: 68 MMHG

## 2020-03-26 DIAGNOSIS — C34.11 MALIGNANT NEOPLASM OF UPPER LOBE, RIGHT BRONCHUS OR LUNG (HCC): Primary | ICD-10-CM

## 2020-03-26 DIAGNOSIS — D64.9 ANEMIA, UNSPECIFIED TYPE: ICD-10-CM

## 2020-03-26 DIAGNOSIS — C34.90 NON-SMALL CELL LUNG CANCER, UNSPECIFIED LATERALITY (HCC): ICD-10-CM

## 2020-03-26 DIAGNOSIS — N18.4 CHRONIC KIDNEY DISEASE, STAGE IV (SEVERE) (HCC): ICD-10-CM

## 2020-03-26 DIAGNOSIS — C80.1 MALIGNANT NEOPLASM (HCC): ICD-10-CM

## 2020-03-26 PROCEDURE — 96372 THER/PROPH/DIAG INJ SC/IM: CPT

## 2020-03-26 PROCEDURE — 6360000002 HC RX W HCPCS: Performed by: INTERNAL MEDICINE

## 2020-03-26 PROCEDURE — 96375 TX/PRO/DX INJ NEW DRUG ADDON: CPT

## 2020-03-26 PROCEDURE — 96417 CHEMO IV INFUS EACH ADDL SEQ: CPT

## 2020-03-26 PROCEDURE — 85025 COMPLETE CBC W/AUTO DIFF WBC: CPT

## 2020-03-26 PROCEDURE — 6360000002 HC RX W HCPCS: Performed by: PHYSICIAN ASSISTANT

## 2020-03-26 PROCEDURE — 96413 CHEMO IV INFUSION 1 HR: CPT

## 2020-03-26 PROCEDURE — 2580000003 HC RX 258: Performed by: INTERNAL MEDICINE

## 2020-03-26 RX ORDER — SODIUM CHLORIDE 0.9 % (FLUSH) 0.9 %
10 SYRINGE (ML) INJECTION PRN
Status: DISCONTINUED | OUTPATIENT
Start: 2020-03-26 | End: 2020-03-27 | Stop reason: HOSPADM

## 2020-03-26 RX ORDER — HEPARIN SODIUM (PORCINE) LOCK FLUSH IV SOLN 100 UNIT/ML 100 UNIT/ML
500 SOLUTION INTRAVENOUS PRN
Status: DISCONTINUED | OUTPATIENT
Start: 2020-03-26 | End: 2020-03-28 | Stop reason: HOSPADM

## 2020-03-26 RX ORDER — DEXAMETHASONE SODIUM PHOSPHATE 10 MG/ML
10 INJECTION, SOLUTION INTRAMUSCULAR; INTRAVENOUS ONCE
Status: COMPLETED | OUTPATIENT
Start: 2020-03-26 | End: 2020-03-26

## 2020-03-26 RX ORDER — CYANOCOBALAMIN 1000 UG/ML
1000 INJECTION INTRAMUSCULAR; SUBCUTANEOUS ONCE
Status: COMPLETED | OUTPATIENT
Start: 2020-03-26 | End: 2020-03-26

## 2020-03-26 RX ORDER — PALONOSETRON 0.05 MG/ML
0.25 INJECTION, SOLUTION INTRAVENOUS ONCE
Status: COMPLETED | OUTPATIENT
Start: 2020-03-26 | End: 2020-03-26

## 2020-03-26 RX ADMIN — DEXAMETHASONE SODIUM PHOSPHATE 10 MG: 10 INJECTION, SOLUTION INTRAMUSCULAR; INTRAVENOUS at 09:32

## 2020-03-26 RX ADMIN — EPOETIN ALFA-EPBX 20000 UNITS: 10000 INJECTION, SOLUTION INTRAVENOUS; SUBCUTANEOUS at 10:57

## 2020-03-26 RX ADMIN — PALONOSETRON HYDROCHLORIDE 0.25 MG: 0.25 INJECTION, SOLUTION INTRAVENOUS at 09:32

## 2020-03-26 RX ADMIN — SODIUM CHLORIDE, PRESERVATIVE FREE 10 ML: 5 INJECTION INTRAVENOUS at 10:53

## 2020-03-26 RX ADMIN — SODIUM CHLORIDE 520 MG: 9 INJECTION, SOLUTION INTRAVENOUS at 10:41

## 2020-03-26 RX ADMIN — SODIUM CHLORIDE 200 MG: 9 INJECTION, SOLUTION INTRAVENOUS at 10:07

## 2020-03-26 RX ADMIN — CYANOCOBALAMIN 1000 MCG: 1000 INJECTION, SOLUTION INTRAMUSCULAR; SUBCUTANEOUS at 10:53

## 2020-03-26 RX ADMIN — HEPARIN 500 UNITS: 100 SYRINGE at 10:53

## 2020-04-02 ENCOUNTER — HOSPITAL ENCOUNTER (OUTPATIENT)
Dept: INFUSION THERAPY | Age: 80
Discharge: HOME | End: 2020-04-02
Payer: MEDICARE

## 2020-04-02 VITALS
HEIGHT: 64 IN | SYSTOLIC BLOOD PRESSURE: 118 MMHG | WEIGHT: 154.1 LBS | TEMPERATURE: 98.4 F | OXYGEN SATURATION: 96 % | BODY MASS INDEX: 26.31 KG/M2 | HEART RATE: 80 BPM | DIASTOLIC BLOOD PRESSURE: 70 MMHG

## 2020-04-02 DIAGNOSIS — C34.90 NON-SMALL CELL LUNG CANCER, UNSPECIFIED LATERALITY (HCC): ICD-10-CM

## 2020-04-02 DIAGNOSIS — N18.4 CHRONIC KIDNEY DISEASE, STAGE IV (SEVERE) (HCC): Primary | ICD-10-CM

## 2020-04-02 DIAGNOSIS — C80.1 MALIGNANT NEOPLASM (HCC): ICD-10-CM

## 2020-04-02 DIAGNOSIS — D64.9 ANEMIA, UNSPECIFIED TYPE: ICD-10-CM

## 2020-04-02 PROCEDURE — 6360000002 HC RX W HCPCS: Performed by: PHYSICIAN ASSISTANT

## 2020-04-02 PROCEDURE — 85025 COMPLETE CBC W/AUTO DIFF WBC: CPT

## 2020-04-02 PROCEDURE — 96372 THER/PROPH/DIAG INJ SC/IM: CPT

## 2020-04-02 RX ADMIN — EPOETIN ALFA-EPBX 20000 UNITS: 10000 INJECTION, SOLUTION INTRAVENOUS; SUBCUTANEOUS at 15:24

## 2020-04-09 ENCOUNTER — HOSPITAL ENCOUNTER (OUTPATIENT)
Dept: INFUSION THERAPY | Age: 80
Discharge: HOME | End: 2020-04-09
Payer: MEDICARE

## 2020-04-09 VITALS
HEART RATE: 77 BPM | SYSTOLIC BLOOD PRESSURE: 116 MMHG | BODY MASS INDEX: 26.4 KG/M2 | TEMPERATURE: 98.4 F | DIASTOLIC BLOOD PRESSURE: 72 MMHG | WEIGHT: 154.6 LBS | OXYGEN SATURATION: 92 % | HEIGHT: 64 IN

## 2020-04-09 DIAGNOSIS — D64.9 ANEMIA, UNSPECIFIED TYPE: ICD-10-CM

## 2020-04-09 DIAGNOSIS — C34.90 NON-SMALL CELL LUNG CANCER, UNSPECIFIED LATERALITY (HCC): ICD-10-CM

## 2020-04-09 DIAGNOSIS — N18.4 CHRONIC KIDNEY DISEASE, STAGE IV (SEVERE) (HCC): Primary | ICD-10-CM

## 2020-04-09 DIAGNOSIS — C80.1 MALIGNANT NEOPLASM (HCC): ICD-10-CM

## 2020-04-09 PROCEDURE — 6360000002 HC RX W HCPCS: Performed by: PHYSICIAN ASSISTANT

## 2020-04-09 PROCEDURE — 96372 THER/PROPH/DIAG INJ SC/IM: CPT

## 2020-04-09 PROCEDURE — 85025 COMPLETE CBC W/AUTO DIFF WBC: CPT

## 2020-04-09 RX ADMIN — EPOETIN ALFA-EPBX 20000 UNITS: 10000 INJECTION, SOLUTION INTRAVENOUS; SUBCUTANEOUS at 14:40

## 2020-04-15 RX ORDER — HEPARIN SODIUM (PORCINE) LOCK FLUSH IV SOLN 100 UNIT/ML 100 UNIT/ML
500 SOLUTION INTRAVENOUS PRN
Status: CANCELLED | OUTPATIENT
Start: 2020-04-16

## 2020-04-15 RX ORDER — SODIUM CHLORIDE 0.9 % (FLUSH) 0.9 %
5 SYRINGE (ML) INJECTION PRN
Status: CANCELLED | OUTPATIENT
Start: 2020-04-16

## 2020-04-15 RX ORDER — SODIUM CHLORIDE 0.9 % (FLUSH) 0.9 %
10 SYRINGE (ML) INJECTION PRN
Status: CANCELLED | OUTPATIENT
Start: 2020-04-16

## 2020-04-15 RX ORDER — SODIUM CHLORIDE 9 MG/ML
20 INJECTION, SOLUTION INTRAVENOUS ONCE
Status: CANCELLED | OUTPATIENT
Start: 2020-04-16

## 2020-04-15 RX ORDER — CYANOCOBALAMIN 1000 UG/ML
1000 INJECTION INTRAMUSCULAR; SUBCUTANEOUS ONCE
Status: CANCELLED | OUTPATIENT
Start: 2020-04-16

## 2020-04-15 RX ORDER — EPINEPHRINE 1 MG/ML
0.3 INJECTION, SOLUTION, CONCENTRATE INTRAVENOUS PRN
Status: CANCELLED | OUTPATIENT
Start: 2020-04-16

## 2020-04-15 RX ORDER — METHYLPREDNISOLONE SODIUM SUCCINATE 125 MG/2ML
125 INJECTION, POWDER, LYOPHILIZED, FOR SOLUTION INTRAMUSCULAR; INTRAVENOUS PRN
Status: CANCELLED | OUTPATIENT
Start: 2020-04-16

## 2020-04-15 RX ORDER — DIPHENHYDRAMINE HYDROCHLORIDE 50 MG/ML
50 INJECTION INTRAMUSCULAR; INTRAVENOUS PRN
Status: CANCELLED | OUTPATIENT
Start: 2020-04-16

## 2020-04-15 RX ORDER — PALONOSETRON 0.05 MG/ML
0.25 INJECTION, SOLUTION INTRAVENOUS ONCE
Status: CANCELLED | OUTPATIENT
Start: 2020-04-16

## 2020-04-16 ENCOUNTER — HOSPITAL ENCOUNTER (OUTPATIENT)
Dept: INFUSION THERAPY | Age: 80
Setting detail: SPECIMEN
Discharge: HOME | End: 2020-04-16
Payer: MEDICARE

## 2020-04-16 VITALS
SYSTOLIC BLOOD PRESSURE: 126 MMHG | DIASTOLIC BLOOD PRESSURE: 70 MMHG | OXYGEN SATURATION: 98 % | WEIGHT: 158 LBS | HEART RATE: 76 BPM | BODY MASS INDEX: 26.98 KG/M2 | HEIGHT: 64 IN | TEMPERATURE: 98.4 F

## 2020-04-16 DIAGNOSIS — C80.1 MALIGNANT NEOPLASM (HCC): ICD-10-CM

## 2020-04-16 DIAGNOSIS — C34.11 MALIGNANT NEOPLASM OF UPPER LOBE, RIGHT BRONCHUS OR LUNG (HCC): Primary | ICD-10-CM

## 2020-04-16 DIAGNOSIS — D64.9 ANEMIA, UNSPECIFIED TYPE: ICD-10-CM

## 2020-04-16 DIAGNOSIS — N18.4 CHRONIC KIDNEY DISEASE, STAGE IV (SEVERE) (HCC): ICD-10-CM

## 2020-04-16 DIAGNOSIS — C34.90 NON-SMALL CELL LUNG CANCER, UNSPECIFIED LATERALITY (HCC): ICD-10-CM

## 2020-04-16 PROCEDURE — 6360000002 HC RX W HCPCS: Performed by: INTERNAL MEDICINE

## 2020-04-16 PROCEDURE — 96375 TX/PRO/DX INJ NEW DRUG ADDON: CPT

## 2020-04-16 PROCEDURE — 96417 CHEMO IV INFUS EACH ADDL SEQ: CPT

## 2020-04-16 PROCEDURE — 96413 CHEMO IV INFUSION 1 HR: CPT

## 2020-04-16 PROCEDURE — 85025 COMPLETE CBC W/AUTO DIFF WBC: CPT

## 2020-04-16 PROCEDURE — 2580000003 HC RX 258: Performed by: INTERNAL MEDICINE

## 2020-04-16 PROCEDURE — 6360000002 HC RX W HCPCS: Performed by: PHYSICIAN ASSISTANT

## 2020-04-16 PROCEDURE — 96372 THER/PROPH/DIAG INJ SC/IM: CPT

## 2020-04-16 RX ORDER — SODIUM CHLORIDE 0.9 % (FLUSH) 0.9 %
10 SYRINGE (ML) INJECTION PRN
Status: DISCONTINUED | OUTPATIENT
Start: 2020-04-16 | End: 2020-04-17 | Stop reason: HOSPADM

## 2020-04-16 RX ORDER — CYANOCOBALAMIN 1000 UG/ML
1000 INJECTION INTRAMUSCULAR; SUBCUTANEOUS ONCE
Status: COMPLETED | OUTPATIENT
Start: 2020-04-16 | End: 2020-04-16

## 2020-04-16 RX ORDER — DEXAMETHASONE SODIUM PHOSPHATE 10 MG/ML
10 INJECTION, SOLUTION INTRAMUSCULAR; INTRAVENOUS ONCE
Status: COMPLETED | OUTPATIENT
Start: 2020-04-16 | End: 2020-04-16

## 2020-04-16 RX ORDER — HEPARIN SODIUM (PORCINE) LOCK FLUSH IV SOLN 100 UNIT/ML 100 UNIT/ML
500 SOLUTION INTRAVENOUS PRN
Status: DISCONTINUED | OUTPATIENT
Start: 2020-04-16 | End: 2020-04-18 | Stop reason: HOSPADM

## 2020-04-16 RX ORDER — PALONOSETRON 0.05 MG/ML
0.25 INJECTION, SOLUTION INTRAVENOUS ONCE
Status: COMPLETED | OUTPATIENT
Start: 2020-04-16 | End: 2020-04-16

## 2020-04-16 RX ADMIN — SODIUM CHLORIDE, PRESERVATIVE FREE 10 ML: 5 INJECTION INTRAVENOUS at 10:43

## 2020-04-16 RX ADMIN — SODIUM CHLORIDE 200 MG: 900 INJECTION, SOLUTION INTRAVENOUS at 10:00

## 2020-04-16 RX ADMIN — PALONOSETRON HYDROCHLORIDE 0.25 MG: 0.25 INJECTION, SOLUTION INTRAVENOUS at 09:43

## 2020-04-16 RX ADMIN — HEPARIN 500 UNITS: 100 SYRINGE at 10:43

## 2020-04-16 RX ADMIN — CYANOCOBALAMIN 1000 MCG: 1000 INJECTION, SOLUTION INTRAMUSCULAR; SUBCUTANEOUS at 10:42

## 2020-04-16 RX ADMIN — DEXAMETHASONE SODIUM PHOSPHATE 10 MG: 10 INJECTION, SOLUTION INTRAMUSCULAR; INTRAVENOUS at 09:43

## 2020-04-16 RX ADMIN — SODIUM CHLORIDE 520 MG: 900 INJECTION, SOLUTION INTRAVENOUS at 10:29

## 2020-04-16 RX ADMIN — EPOETIN ALFA-EPBX 20000 UNITS: 10000 INJECTION, SOLUTION INTRAVENOUS; SUBCUTANEOUS at 10:42

## 2020-04-23 ENCOUNTER — HOSPITAL ENCOUNTER (OUTPATIENT)
Dept: INFUSION THERAPY | Age: 80
Discharge: HOME | End: 2020-04-23
Payer: MEDICARE

## 2020-04-23 VITALS
WEIGHT: 154.4 LBS | BODY MASS INDEX: 26.36 KG/M2 | HEIGHT: 64 IN | HEART RATE: 79 BPM | OXYGEN SATURATION: 95 % | SYSTOLIC BLOOD PRESSURE: 122 MMHG | TEMPERATURE: 98 F | DIASTOLIC BLOOD PRESSURE: 72 MMHG

## 2020-04-23 DIAGNOSIS — N18.4 CHRONIC KIDNEY DISEASE, STAGE IV (SEVERE) (HCC): Primary | ICD-10-CM

## 2020-04-23 DIAGNOSIS — C80.1 MALIGNANT NEOPLASM (HCC): ICD-10-CM

## 2020-04-23 PROCEDURE — 85025 COMPLETE CBC W/AUTO DIFF WBC: CPT

## 2020-04-23 PROCEDURE — 6360000002 HC RX W HCPCS: Performed by: PHYSICIAN ASSISTANT

## 2020-04-23 PROCEDURE — 96372 THER/PROPH/DIAG INJ SC/IM: CPT

## 2020-04-23 RX ADMIN — EPOETIN ALFA-EPBX 20000 UNITS: 10000 INJECTION, SOLUTION INTRAVENOUS; SUBCUTANEOUS at 14:32

## 2020-04-29 ENCOUNTER — HOSPITAL ENCOUNTER (OUTPATIENT)
Dept: INFUSION THERAPY | Age: 80
Discharge: HOME | End: 2020-04-29
Payer: MEDICARE

## 2020-04-29 VITALS
TEMPERATURE: 97.9 F | SYSTOLIC BLOOD PRESSURE: 90 MMHG | OXYGEN SATURATION: 90 % | HEART RATE: 71 BPM | WEIGHT: 148 LBS | HEIGHT: 64 IN | BODY MASS INDEX: 25.27 KG/M2 | DIASTOLIC BLOOD PRESSURE: 60 MMHG

## 2020-04-29 DIAGNOSIS — C80.1 MALIGNANT NEOPLASM (HCC): ICD-10-CM

## 2020-04-29 DIAGNOSIS — N18.4 CHRONIC KIDNEY DISEASE, STAGE IV (SEVERE) (HCC): Primary | ICD-10-CM

## 2020-04-29 DIAGNOSIS — C34.90 NON-SMALL CELL LUNG CANCER, UNSPECIFIED LATERALITY (HCC): ICD-10-CM

## 2020-04-29 DIAGNOSIS — D64.9 ANEMIA, UNSPECIFIED TYPE: ICD-10-CM

## 2020-04-29 PROCEDURE — 96372 THER/PROPH/DIAG INJ SC/IM: CPT

## 2020-04-29 PROCEDURE — 6360000002 HC RX W HCPCS: Performed by: PHYSICIAN ASSISTANT

## 2020-04-29 PROCEDURE — 6360000002 HC RX W HCPCS: Performed by: INTERNAL MEDICINE

## 2020-04-29 PROCEDURE — 96361 HYDRATE IV INFUSION ADD-ON: CPT

## 2020-04-29 PROCEDURE — 85025 COMPLETE CBC W/AUTO DIFF WBC: CPT

## 2020-04-29 PROCEDURE — 96360 HYDRATION IV INFUSION INIT: CPT

## 2020-04-29 PROCEDURE — 2580000003 HC RX 258: Performed by: INTERNAL MEDICINE

## 2020-04-29 RX ORDER — HEPARIN SODIUM (PORCINE) LOCK FLUSH IV SOLN 100 UNIT/ML 100 UNIT/ML
500 SOLUTION INTRAVENOUS PRN
Status: DISCONTINUED | OUTPATIENT
Start: 2020-04-29 | End: 2020-05-01 | Stop reason: HOSPADM

## 2020-04-29 RX ORDER — SODIUM CHLORIDE 9 MG/ML
INJECTION, SOLUTION INTRAVENOUS CONTINUOUS
Status: DISCONTINUED | OUTPATIENT
Start: 2020-04-29 | End: 2020-05-01 | Stop reason: HOSPADM

## 2020-04-29 RX ORDER — AMOXICILLIN 250 MG
2 CAPSULE ORAL 2 TIMES DAILY
Qty: 120 TABLET | Refills: 1 | Status: SHIPPED | OUTPATIENT
Start: 2020-04-29 | End: 2020-09-08

## 2020-04-29 RX ADMIN — SODIUM CHLORIDE: 9 INJECTION, SOLUTION INTRAVENOUS at 11:48

## 2020-04-29 RX ADMIN — EPOETIN ALFA-EPBX 20000 UNITS: 10000 INJECTION, SOLUTION INTRAVENOUS; SUBCUTANEOUS at 11:48

## 2020-04-29 RX ADMIN — HEPARIN 500 UNITS: 100 SYRINGE at 13:52

## 2020-05-06 RX ORDER — METHYLPREDNISOLONE SODIUM SUCCINATE 125 MG/2ML
125 INJECTION, POWDER, LYOPHILIZED, FOR SOLUTION INTRAMUSCULAR; INTRAVENOUS PRN
Status: CANCELLED | OUTPATIENT
Start: 2020-05-07

## 2020-05-06 RX ORDER — HEPARIN SODIUM (PORCINE) LOCK FLUSH IV SOLN 100 UNIT/ML 100 UNIT/ML
500 SOLUTION INTRAVENOUS PRN
Status: CANCELLED | OUTPATIENT
Start: 2020-05-07

## 2020-05-06 RX ORDER — SODIUM CHLORIDE 0.9 % (FLUSH) 0.9 %
10 SYRINGE (ML) INJECTION PRN
Status: CANCELLED | OUTPATIENT
Start: 2020-05-07

## 2020-05-06 RX ORDER — SODIUM CHLORIDE 0.9 % (FLUSH) 0.9 %
5 SYRINGE (ML) INJECTION PRN
Status: CANCELLED | OUTPATIENT
Start: 2020-05-07

## 2020-05-06 RX ORDER — PALONOSETRON 0.05 MG/ML
0.25 INJECTION, SOLUTION INTRAVENOUS ONCE
Status: CANCELLED | OUTPATIENT
Start: 2020-05-07

## 2020-05-06 RX ORDER — DIPHENHYDRAMINE HYDROCHLORIDE 50 MG/ML
50 INJECTION INTRAMUSCULAR; INTRAVENOUS PRN
Status: CANCELLED | OUTPATIENT
Start: 2020-05-07

## 2020-05-06 RX ORDER — EPINEPHRINE 1 MG/ML
0.3 INJECTION, SOLUTION, CONCENTRATE INTRAVENOUS PRN
Status: CANCELLED | OUTPATIENT
Start: 2020-05-07

## 2020-05-06 RX ORDER — SODIUM CHLORIDE 9 MG/ML
20 INJECTION, SOLUTION INTRAVENOUS ONCE
Status: CANCELLED | OUTPATIENT
Start: 2020-05-07

## 2020-05-06 RX ORDER — CYANOCOBALAMIN 1000 UG/ML
1000 INJECTION INTRAMUSCULAR; SUBCUTANEOUS ONCE
Status: CANCELLED | OUTPATIENT
Start: 2020-05-07

## 2020-05-07 ENCOUNTER — OFFICE VISIT (OUTPATIENT)
Dept: HEMATOLOGY | Age: 80
End: 2020-05-07
Payer: MEDICARE

## 2020-05-07 ENCOUNTER — HOSPITAL ENCOUNTER (OUTPATIENT)
Dept: INFUSION THERAPY | Age: 80
Discharge: HOME | End: 2020-05-07
Payer: MEDICARE

## 2020-05-07 VITALS
HEIGHT: 64 IN | TEMPERATURE: 97.7 F | OXYGEN SATURATION: 99 % | SYSTOLIC BLOOD PRESSURE: 142 MMHG | BODY MASS INDEX: 26.8 KG/M2 | WEIGHT: 157 LBS | HEART RATE: 73 BPM | DIASTOLIC BLOOD PRESSURE: 78 MMHG

## 2020-05-07 DIAGNOSIS — N18.4 CHRONIC KIDNEY DISEASE, STAGE IV (SEVERE) (HCC): ICD-10-CM

## 2020-05-07 DIAGNOSIS — C34.90 NON-SMALL CELL LUNG CANCER, UNSPECIFIED LATERALITY (HCC): ICD-10-CM

## 2020-05-07 DIAGNOSIS — D64.9 ANEMIA, UNSPECIFIED TYPE: ICD-10-CM

## 2020-05-07 DIAGNOSIS — C80.1 MALIGNANT NEOPLASM (HCC): ICD-10-CM

## 2020-05-07 DIAGNOSIS — D64.9 FATIGUE ASSOCIATED WITH ANEMIA: ICD-10-CM

## 2020-05-07 DIAGNOSIS — C34.11 MALIGNANT NEOPLASM OF UPPER LOBE, RIGHT BRONCHUS OR LUNG (HCC): Primary | ICD-10-CM

## 2020-05-07 PROCEDURE — 1123F ACP DISCUSS/DSCN MKR DOCD: CPT | Performed by: INTERNAL MEDICINE

## 2020-05-07 PROCEDURE — 85025 COMPLETE CBC W/AUTO DIFF WBC: CPT

## 2020-05-07 PROCEDURE — 2580000003 HC RX 258: Performed by: INTERNAL MEDICINE

## 2020-05-07 PROCEDURE — G8417 CALC BMI ABV UP PARAM F/U: HCPCS | Performed by: INTERNAL MEDICINE

## 2020-05-07 PROCEDURE — 6360000002 HC RX W HCPCS: Performed by: PHYSICIAN ASSISTANT

## 2020-05-07 PROCEDURE — 80053 COMPREHEN METABOLIC PANEL: CPT

## 2020-05-07 PROCEDURE — 96372 THER/PROPH/DIAG INJ SC/IM: CPT

## 2020-05-07 PROCEDURE — 4040F PNEUMOC VAC/ADMIN/RCVD: CPT | Performed by: INTERNAL MEDICINE

## 2020-05-07 PROCEDURE — 6360000002 HC RX W HCPCS: Performed by: INTERNAL MEDICINE

## 2020-05-07 PROCEDURE — 96413 CHEMO IV INFUSION 1 HR: CPT

## 2020-05-07 PROCEDURE — 1036F TOBACCO NON-USER: CPT | Performed by: INTERNAL MEDICINE

## 2020-05-07 PROCEDURE — 36415 COLL VENOUS BLD VENIPUNCTURE: CPT

## 2020-05-07 PROCEDURE — 99215 OFFICE O/P EST HI 40 MIN: CPT | Performed by: INTERNAL MEDICINE

## 2020-05-07 PROCEDURE — 84443 ASSAY THYROID STIM HORMONE: CPT

## 2020-05-07 PROCEDURE — G8427 DOCREV CUR MEDS BY ELIG CLIN: HCPCS | Performed by: INTERNAL MEDICINE

## 2020-05-07 RX ORDER — HEPARIN SODIUM (PORCINE) LOCK FLUSH IV SOLN 100 UNIT/ML 100 UNIT/ML
500 SOLUTION INTRAVENOUS PRN
Status: DISCONTINUED | OUTPATIENT
Start: 2020-05-07 | End: 2020-05-09 | Stop reason: HOSPADM

## 2020-05-07 RX ORDER — SODIUM CHLORIDE 9 MG/ML
INJECTION, SOLUTION INTRAVENOUS ONCE
Status: COMPLETED | OUTPATIENT
Start: 2020-05-07 | End: 2020-05-07

## 2020-05-07 RX ORDER — PALONOSETRON 0.05 MG/ML
0.25 INJECTION, SOLUTION INTRAVENOUS ONCE
Status: DISCONTINUED | OUTPATIENT
Start: 2020-05-07 | End: 2020-05-09 | Stop reason: HOSPADM

## 2020-05-07 RX ORDER — DEXAMETHASONE SODIUM PHOSPHATE 10 MG/ML
10 INJECTION, SOLUTION INTRAMUSCULAR; INTRAVENOUS ONCE
Status: DISCONTINUED | OUTPATIENT
Start: 2020-05-07 | End: 2020-05-09 | Stop reason: HOSPADM

## 2020-05-07 RX ORDER — CYANOCOBALAMIN 1000 UG/ML
1000 INJECTION INTRAMUSCULAR; SUBCUTANEOUS ONCE
Status: COMPLETED | OUTPATIENT
Start: 2020-05-07 | End: 2020-05-07

## 2020-05-07 RX ORDER — SODIUM CHLORIDE 0.9 % (FLUSH) 0.9 %
10 SYRINGE (ML) INJECTION PRN
Status: DISCONTINUED | OUTPATIENT
Start: 2020-05-07 | End: 2020-05-08 | Stop reason: HOSPADM

## 2020-05-07 RX ADMIN — SODIUM CHLORIDE, PRESERVATIVE FREE 10 ML: 5 INJECTION INTRAVENOUS at 10:48

## 2020-05-07 RX ADMIN — SODIUM CHLORIDE, PRESERVATIVE FREE 10 ML: 5 INJECTION INTRAVENOUS at 12:18

## 2020-05-07 RX ADMIN — HEPARIN 500 UNITS: 100 SYRINGE at 12:18

## 2020-05-07 RX ADMIN — EPOETIN ALFA-EPBX 20000 UNITS: 10000 INJECTION, SOLUTION INTRAVENOUS; SUBCUTANEOUS at 10:46

## 2020-05-07 RX ADMIN — SODIUM CHLORIDE 200 MG: 900 INJECTION, SOLUTION INTRAVENOUS at 10:15

## 2020-05-07 RX ADMIN — CYANOCOBALAMIN 1000 MCG: 1000 INJECTION, SOLUTION INTRAMUSCULAR; SUBCUTANEOUS at 10:46

## 2020-05-07 RX ADMIN — HEPARIN 500 UNITS: 100 SYRINGE at 10:47

## 2020-05-07 RX ADMIN — SODIUM CHLORIDE: 9 INJECTION, SOLUTION INTRAVENOUS at 11:30

## 2020-05-07 NOTE — PROGRESS NOTES
that measured 1.5 x 1.5 x 3.5 cm.     A CT scan of the abdomen and pelvis with contrast on 9/12/2018 documented a 4.9 x 4 x 4 cm soft tissue mass with peripheral calcification immediately adjacent to the gallbladder in the head of the pancreas area.     An MRI of the abdomen and pelvis W & WO on 10/11/2018 identified in the 4.1 x 3.4 cm soft tissue mass in the subhepatic space, abutting the second portion of the duodenum with mild displacement of the duodenum. There does not appear to be involvement of the pancreas, and the pancreas appears within normal limits. Differential diagnoses include a desmoid tumor, less likely leiomyoma of the wall of the duodenum or malignancy. Dr. Fani Tavarez requested a CT-guided biopsy of the right upper lobe mass. Dr. Venu Mix, the radiologist, evaluated the area with a repeat CT scan of the chest on 10/11/2018. He spoke with Dr. Fani Tavarez indicating that he would not be able to perform the biopsy because the tumor was not accessible, it was under the scapula , which blocked access and therefore the biopsy procedure was canceled.     On the CT scan of the chest on 10/11/2018 measurements of the RUL lung mass was 5.7 x 3.2 cm with irregular margins suspicious for a primary lung neoplasm. Soft tissue associated with the tumor appeared to extend into the bronchus supplying this portion of the RUL of the lung. Dr. Venu Mix indicated that the mass had an endobronchial component and should be easily assessable via bronchoscopy.     The chest CT also included a portion of the upper abdomen where a soft tissue mass was described in an addendum report. In the subhepatic space measuring 4.0 x 3.9 cm, displacing the second portion of the duodenum medially.   A referral was made to  [facility] for consideration for bronchoscopy for biopsy.     On 10/24/2018, Dr. Jessy Trimble performed a bronchoscopy with EBUS guided biopsy of a 3 cm subcarinal lymph node along with bronchoalveolar lavage of the appears within normal limits. Differential diagnoses include a desmoid tumor, less likely leiomyoma of the wall of the duodenum or malignancy.     CT of the abdomen and pelvis without contrast on 3/20/2019 at [facility] was compared to outpatient CT data and pelvis on 9/12/2018 an MRI of the abdomen from 10/11/2018. A soft tissue mass was again encountered in the subhepatic space which abutted the distal stomach and proximal duodenum measuring 8.9 x 5.4 cm which has increased considerably from a prior measurement of 4.1 x 3.4 cm. Medial to the left renal hilum a 3.5 cm lobular mass causing some mild left-sided hydronephrosis.     Mr. Maryjo Caballero was scheduled for an EGD/EUS by Dr. Sammy Keys as an outpatient procedure on 3/13/2019 for assessment and biopsy of the enlarging peripancreatic/duodenal area mass. Unfortunately, after initiation of the procedure, he began having ventricular tachycardia and the procedure had to be aborted. Mary Lou Mckeon was transferred to the ICU for cardiology evaluation and stabilization. He remained hospitalized until 3/18/2019 when he was medically cleared for discharge.     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 No emergent urologic intervention was warranted and he received monitoring of renal function. He had a creatinine level of 1.9 at discharge.     PET scan on 4/2/2019 identified a soft tissue mass in the RUL measuring 1.2 x 3.5 cm with extension to the right anterior hilum with an SUV of 10.1. Evidence of mediastinal and hilar lymphadenopathy, with low-level metabolic activity. Interval increase in the size of a large mass in the right upper abdomen inseparable from the duodenum measuring 10.7 x 6.9 cm compared to 5.4 x 8.9 cm on 2/20/2019 with an SUV of 23.6.  Slight increase in 2 small inseparable masses medial to the hilum of the left kidney measuring 2.2 and 2.6 cm and the orders of the defined types were placed in this encounter. No follow-ups on file. I, Dr. Kale Wiley, personally performed the services described in this documentation as scribed by [facility] in my presence, and it is both accurate and complete.

## 2020-05-12 RX ORDER — FOLIC ACID 1 MG/1
TABLET ORAL
Qty: 30 TABLET | Refills: 0 | Status: SHIPPED | OUTPATIENT
Start: 2020-05-12 | End: 2020-06-16

## 2020-05-14 ENCOUNTER — HOSPITAL ENCOUNTER (OUTPATIENT)
Dept: INFUSION THERAPY | Age: 80
Discharge: HOME | End: 2020-05-14
Payer: MEDICARE

## 2020-05-14 VITALS
BODY MASS INDEX: 27.2 KG/M2 | SYSTOLIC BLOOD PRESSURE: 118 MMHG | WEIGHT: 159.3 LBS | HEART RATE: 88 BPM | HEIGHT: 64 IN | DIASTOLIC BLOOD PRESSURE: 58 MMHG | TEMPERATURE: 98.3 F | OXYGEN SATURATION: 97 %

## 2020-05-14 DIAGNOSIS — D63.1 ANEMIA, CHRONIC RENAL FAILURE, STAGE 3 (MODERATE) (HCC): ICD-10-CM

## 2020-05-14 DIAGNOSIS — C80.1 MALIGNANT NEOPLASM (HCC): ICD-10-CM

## 2020-05-14 DIAGNOSIS — N18.4 CHRONIC KIDNEY DISEASE, STAGE IV (SEVERE) (HCC): ICD-10-CM

## 2020-05-14 DIAGNOSIS — C34.90 NON-SMALL CELL LUNG CANCER, UNSPECIFIED LATERALITY (HCC): Primary | ICD-10-CM

## 2020-05-14 DIAGNOSIS — N18.30 ANEMIA, CHRONIC RENAL FAILURE, STAGE 3 (MODERATE) (HCC): ICD-10-CM

## 2020-05-14 LAB
BASOPHILS ABSOLUTE: 0.04 K/UL (ref 0.01–0.08)
BASOPHILS RELATIVE PERCENT: 0.4 % (ref 0.1–1.2)
EOSINOPHILS ABSOLUTE: 0.28 K/UL (ref 0.04–0.54)
EOSINOPHILS RELATIVE PERCENT: 2.5 % (ref 0.7–7)
HCT VFR BLD CALC: 34.7 % (ref 40.1–51)
HEMOGLOBIN: 10.3 G/DL (ref 13.7–17.5)
LYMPHOCYTES ABSOLUTE: 1.27 K/UL (ref 1.18–3.74)
LYMPHOCYTES RELATIVE PERCENT: 11.4 % (ref 19.3–53.1)
MCH RBC QN AUTO: 38.6 PG (ref 25.7–32.2)
MCHC RBC AUTO-ENTMCNC: 29.7 G/DL (ref 32.3–36.5)
MCV RBC AUTO: 130 FL (ref 79–92.2)
MONOCYTES ABSOLUTE: 2.27 K/UL (ref 0.24–0.82)
MONOCYTES RELATIVE PERCENT: 20.4 % (ref 4.7–12.5)
NEUTROPHILS ABSOLUTE: 7.26 K/UL (ref 1.56–6.13)
NEUTROPHILS RELATIVE PERCENT: 65.3 % (ref 34–71.1)
PDW BLD-RTO: 18.5 % (ref 11.6–14.4)
PLATELET # BLD: 225 K/UL (ref 163–337)
PMV BLD AUTO: 10.7 FL (ref 7.4–10.4)
RBC # BLD: 2.67 M/UL (ref 4.63–6.08)
WBC # BLD: 11.12 K/UL (ref 4.23–9.07)

## 2020-05-14 PROCEDURE — 6360000002 HC RX W HCPCS: Performed by: PHYSICIAN ASSISTANT

## 2020-05-14 PROCEDURE — 85025 COMPLETE CBC W/AUTO DIFF WBC: CPT

## 2020-05-14 PROCEDURE — 96372 THER/PROPH/DIAG INJ SC/IM: CPT

## 2020-05-14 RX ADMIN — EPOETIN ALFA-EPBX 20000 UNITS: 10000 INJECTION, SOLUTION INTRAVENOUS; SUBCUTANEOUS at 10:37

## 2020-05-21 ENCOUNTER — HOSPITAL ENCOUNTER (OUTPATIENT)
Dept: INFUSION THERAPY | Age: 80
Discharge: HOME | End: 2020-05-21
Payer: MEDICARE

## 2020-05-21 VITALS
OXYGEN SATURATION: 95 % | BODY MASS INDEX: 26.91 KG/M2 | HEIGHT: 64 IN | TEMPERATURE: 98.6 F | SYSTOLIC BLOOD PRESSURE: 118 MMHG | HEART RATE: 80 BPM | WEIGHT: 157.6 LBS | DIASTOLIC BLOOD PRESSURE: 74 MMHG

## 2020-05-21 DIAGNOSIS — N18.30 ANEMIA, CHRONIC RENAL FAILURE, STAGE 3 (MODERATE) (HCC): ICD-10-CM

## 2020-05-21 DIAGNOSIS — C34.90 NON-SMALL CELL LUNG CANCER, UNSPECIFIED LATERALITY (HCC): ICD-10-CM

## 2020-05-21 DIAGNOSIS — D63.1 ANEMIA, CHRONIC RENAL FAILURE, STAGE 3 (MODERATE) (HCC): ICD-10-CM

## 2020-05-21 LAB
BASOPHILS ABSOLUTE: 0.07 K/UL (ref 0.01–0.08)
BASOPHILS RELATIVE PERCENT: 0.6 % (ref 0.1–1.2)
EOSINOPHILS ABSOLUTE: 0.12 K/UL (ref 0.04–0.54)
EOSINOPHILS RELATIVE PERCENT: 1.1 % (ref 0.7–7)
HCT VFR BLD CALC: 38.9 % (ref 40.1–51)
HEMOGLOBIN: 11.7 G/DL (ref 13.7–17.5)
LYMPHOCYTES ABSOLUTE: 1.29 K/UL (ref 1.18–3.74)
LYMPHOCYTES RELATIVE PERCENT: 11.4 % (ref 19.3–53.1)
MCH RBC QN AUTO: 37.4 PG (ref 25.7–32.2)
MCHC RBC AUTO-ENTMCNC: 30.1 G/DL (ref 32.3–36.5)
MCV RBC AUTO: 124.3 FL (ref 79–92.2)
MONOCYTES ABSOLUTE: 1.59 K/UL (ref 0.24–0.82)
MONOCYTES RELATIVE PERCENT: 14 % (ref 4.7–12.5)
NEUTROPHILS ABSOLUTE: 8.26 K/UL (ref 1.56–6.13)
NEUTROPHILS RELATIVE PERCENT: 72.9 % (ref 34–71.1)
PDW BLD-RTO: 17.4 % (ref 11.6–14.4)
PLATELET # BLD: 167 K/UL (ref 163–337)
PMV BLD AUTO: 11.1 FL (ref 7.4–10.4)
RBC # BLD: 3.13 M/UL (ref 4.63–6.08)
WBC # BLD: 11.33 K/UL (ref 4.23–9.07)

## 2020-05-21 PROCEDURE — 85025 COMPLETE CBC W/AUTO DIFF WBC: CPT

## 2020-05-27 RX ORDER — EPINEPHRINE 1 MG/ML
0.3 INJECTION, SOLUTION, CONCENTRATE INTRAVENOUS PRN
Status: CANCELLED | OUTPATIENT
Start: 2020-05-28

## 2020-05-27 RX ORDER — CYANOCOBALAMIN 1000 UG/ML
1000 INJECTION INTRAMUSCULAR; SUBCUTANEOUS ONCE
Status: CANCELLED | OUTPATIENT
Start: 2020-05-28

## 2020-05-27 RX ORDER — PALONOSETRON 0.05 MG/ML
0.25 INJECTION, SOLUTION INTRAVENOUS ONCE
Status: CANCELLED | OUTPATIENT
Start: 2020-05-28

## 2020-05-27 RX ORDER — SODIUM CHLORIDE 9 MG/ML
20 INJECTION, SOLUTION INTRAVENOUS ONCE
Status: CANCELLED | OUTPATIENT
Start: 2020-05-28

## 2020-05-27 RX ORDER — SODIUM CHLORIDE 0.9 % (FLUSH) 0.9 %
5 SYRINGE (ML) INJECTION PRN
Status: CANCELLED | OUTPATIENT
Start: 2020-05-28

## 2020-05-27 RX ORDER — SODIUM CHLORIDE 0.9 % (FLUSH) 0.9 %
10 SYRINGE (ML) INJECTION PRN
Status: CANCELLED | OUTPATIENT
Start: 2020-05-28

## 2020-05-27 RX ORDER — HEPARIN SODIUM (PORCINE) LOCK FLUSH IV SOLN 100 UNIT/ML 100 UNIT/ML
500 SOLUTION INTRAVENOUS PRN
Status: CANCELLED | OUTPATIENT
Start: 2020-05-28

## 2020-05-27 RX ORDER — DIPHENHYDRAMINE HYDROCHLORIDE 50 MG/ML
50 INJECTION INTRAMUSCULAR; INTRAVENOUS PRN
Status: CANCELLED | OUTPATIENT
Start: 2020-05-28

## 2020-05-27 RX ORDER — METHYLPREDNISOLONE SODIUM SUCCINATE 125 MG/2ML
125 INJECTION, POWDER, LYOPHILIZED, FOR SOLUTION INTRAMUSCULAR; INTRAVENOUS PRN
Status: CANCELLED | OUTPATIENT
Start: 2020-05-28

## 2020-05-28 ENCOUNTER — HOSPITAL ENCOUNTER (OUTPATIENT)
Dept: INFUSION THERAPY | Age: 80
Discharge: HOME | End: 2020-05-28
Payer: MEDICARE

## 2020-05-28 ENCOUNTER — OFFICE VISIT (OUTPATIENT)
Dept: HEMATOLOGY | Age: 80
End: 2020-05-28
Payer: MEDICARE

## 2020-05-28 VITALS
HEIGHT: 64 IN | BODY MASS INDEX: 27.14 KG/M2 | SYSTOLIC BLOOD PRESSURE: 124 MMHG | WEIGHT: 159 LBS | DIASTOLIC BLOOD PRESSURE: 76 MMHG | TEMPERATURE: 98.3 F | OXYGEN SATURATION: 94 % | HEART RATE: 74 BPM

## 2020-05-28 DIAGNOSIS — C34.11 MALIGNANT NEOPLASM OF UPPER LOBE, RIGHT BRONCHUS OR LUNG (HCC): ICD-10-CM

## 2020-05-28 DIAGNOSIS — D63.1 ANEMIA, CHRONIC RENAL FAILURE, STAGE 3 (MODERATE) (HCC): ICD-10-CM

## 2020-05-28 DIAGNOSIS — C34.90 NON-SMALL CELL LUNG CANCER, UNSPECIFIED LATERALITY (HCC): Primary | ICD-10-CM

## 2020-05-28 DIAGNOSIS — N18.4 CHRONIC KIDNEY DISEASE, STAGE IV (SEVERE) (HCC): ICD-10-CM

## 2020-05-28 DIAGNOSIS — C80.1 MALIGNANT NEOPLASM (HCC): ICD-10-CM

## 2020-05-28 DIAGNOSIS — N18.30 ANEMIA, CHRONIC RENAL FAILURE, STAGE 3 (MODERATE) (HCC): ICD-10-CM

## 2020-05-28 DIAGNOSIS — R53.83 FATIGUE DUE TO TREATMENT: ICD-10-CM

## 2020-05-28 LAB
ALBUMIN SERPL-MCNC: 3.8 G/DL (ref 3.5–5.2)
ALP BLD-CCNC: 127 U/L (ref 40–130)
ALT SERPL-CCNC: 24 U/L (ref 21–72)
ANION GAP SERPL CALCULATED.3IONS-SCNC: 11 MMOL/L (ref 7–19)
AST SERPL-CCNC: 42 U/L (ref 17–59)
BASOPHILS ABSOLUTE: 0.02 K/UL (ref 0.01–0.08)
BASOPHILS RELATIVE PERCENT: 0.2 % (ref 0.1–1.2)
BILIRUB SERPL-MCNC: 0.3 MG/DL (ref 0.2–1.3)
BUN BLDV-MCNC: 43 MG/DL (ref 9–20)
CALCIUM SERPL-MCNC: 10.2 MG/DL (ref 8.4–10.2)
CHLORIDE BLD-SCNC: 104 MMOL/L (ref 98–111)
CO2: 23 MMOL/L (ref 22–29)
CREAT SERPL-MCNC: 2.2 MG/DL (ref 0.6–1.2)
EOSINOPHILS ABSOLUTE: 0.01 K/UL (ref 0.04–0.54)
EOSINOPHILS RELATIVE PERCENT: 0.1 % (ref 0.7–7)
GFR NON-AFRICAN AMERICAN: 29
GLOBULIN: 3.7 G/DL
GLUCOSE BLD-MCNC: 187 MG/DL (ref 74–106)
HCT VFR BLD CALC: 33.5 % (ref 40.1–51)
HEMOGLOBIN: 10.8 G/DL (ref 13.7–17.5)
LYMPHOCYTES ABSOLUTE: 0.75 K/UL (ref 1.18–3.74)
LYMPHOCYTES RELATIVE PERCENT: 7.2 % (ref 19.3–53.1)
MCH RBC QN AUTO: 36.7 PG (ref 25.7–32.2)
MCHC RBC AUTO-ENTMCNC: 32.2 G/DL (ref 32.3–36.5)
MCV RBC AUTO: 113.9 FL (ref 79–92.2)
MONOCYTES ABSOLUTE: 0.82 K/UL (ref 0.24–0.82)
MONOCYTES RELATIVE PERCENT: 7.8 % (ref 4.7–12.5)
NEUTROPHILS ABSOLUTE: 8.85 K/UL (ref 1.56–6.13)
NEUTROPHILS RELATIVE PERCENT: 84.7 % (ref 34–71.1)
PDW BLD-RTO: 15.5 % (ref 11.6–14.4)
PLATELET # BLD: 199 K/UL (ref 163–337)
PMV BLD AUTO: 10.9 FL (ref 7.4–10.4)
POTASSIUM SERPL-SCNC: 5.1 MMOL/L (ref 3.5–5.1)
RBC # BLD: 2.94 M/UL (ref 4.63–6.08)
SODIUM BLD-SCNC: 138 MMOL/L (ref 137–145)
TOTAL PROTEIN: 7.5 G/DL (ref 6.3–8.2)
TSH SERPL DL<=0.05 MIU/L-ACNC: 0.77 UIU/ML (ref 0.47–4.68)
WBC # BLD: 10.45 K/UL (ref 4.23–9.07)

## 2020-05-28 PROCEDURE — 1123F ACP DISCUSS/DSCN MKR DOCD: CPT | Performed by: INTERNAL MEDICINE

## 2020-05-28 PROCEDURE — 96372 THER/PROPH/DIAG INJ SC/IM: CPT

## 2020-05-28 PROCEDURE — 2580000003 HC RX 258: Performed by: INTERNAL MEDICINE

## 2020-05-28 PROCEDURE — 4040F PNEUMOC VAC/ADMIN/RCVD: CPT | Performed by: INTERNAL MEDICINE

## 2020-05-28 PROCEDURE — 6360000002 HC RX W HCPCS: Performed by: PHYSICIAN ASSISTANT

## 2020-05-28 PROCEDURE — 1036F TOBACCO NON-USER: CPT | Performed by: INTERNAL MEDICINE

## 2020-05-28 PROCEDURE — 96417 CHEMO IV INFUS EACH ADDL SEQ: CPT

## 2020-05-28 PROCEDURE — 96375 TX/PRO/DX INJ NEW DRUG ADDON: CPT

## 2020-05-28 PROCEDURE — 84443 ASSAY THYROID STIM HORMONE: CPT

## 2020-05-28 PROCEDURE — 99214 OFFICE O/P EST MOD 30 MIN: CPT | Performed by: INTERNAL MEDICINE

## 2020-05-28 PROCEDURE — 6360000002 HC RX W HCPCS: Performed by: INTERNAL MEDICINE

## 2020-05-28 PROCEDURE — G8417 CALC BMI ABV UP PARAM F/U: HCPCS | Performed by: INTERNAL MEDICINE

## 2020-05-28 PROCEDURE — 80053 COMPREHEN METABOLIC PANEL: CPT

## 2020-05-28 PROCEDURE — 96413 CHEMO IV INFUSION 1 HR: CPT

## 2020-05-28 PROCEDURE — G8427 DOCREV CUR MEDS BY ELIG CLIN: HCPCS | Performed by: INTERNAL MEDICINE

## 2020-05-28 PROCEDURE — 85025 COMPLETE CBC W/AUTO DIFF WBC: CPT

## 2020-05-28 RX ORDER — SODIUM CHLORIDE 0.9 % (FLUSH) 0.9 %
10 SYRINGE (ML) INJECTION PRN
Status: DISCONTINUED | OUTPATIENT
Start: 2020-05-28 | End: 2020-05-29 | Stop reason: HOSPADM

## 2020-05-28 RX ORDER — DEXAMETHASONE SODIUM PHOSPHATE 10 MG/ML
10 INJECTION, SOLUTION INTRAMUSCULAR; INTRAVENOUS ONCE
Status: COMPLETED | OUTPATIENT
Start: 2020-05-28 | End: 2020-05-28

## 2020-05-28 RX ORDER — CYANOCOBALAMIN 1000 UG/ML
1000 INJECTION INTRAMUSCULAR; SUBCUTANEOUS ONCE
Status: COMPLETED | OUTPATIENT
Start: 2020-05-28 | End: 2020-05-28

## 2020-05-28 RX ORDER — PALONOSETRON 0.05 MG/ML
0.25 INJECTION, SOLUTION INTRAVENOUS ONCE
Status: COMPLETED | OUTPATIENT
Start: 2020-05-28 | End: 2020-05-28

## 2020-05-28 RX ORDER — HEPARIN SODIUM (PORCINE) LOCK FLUSH IV SOLN 100 UNIT/ML 100 UNIT/ML
500 SOLUTION INTRAVENOUS PRN
Status: DISCONTINUED | OUTPATIENT
Start: 2020-05-28 | End: 2020-05-30 | Stop reason: HOSPADM

## 2020-05-28 RX ADMIN — DEXAMETHASONE SODIUM PHOSPHATE 10 MG: 10 INJECTION, SOLUTION INTRAMUSCULAR; INTRAVENOUS at 09:49

## 2020-05-28 RX ADMIN — Medication 10 ML: at 10:42

## 2020-05-28 RX ADMIN — SODIUM CHLORIDE 200 MG: 900 INJECTION, SOLUTION INTRAVENOUS at 09:58

## 2020-05-28 RX ADMIN — CYANOCOBALAMIN 1000 MCG: 1000 INJECTION, SOLUTION INTRAMUSCULAR; SUBCUTANEOUS at 10:42

## 2020-05-28 RX ADMIN — SODIUM CHLORIDE 520 MG: 900 INJECTION, SOLUTION INTRAVENOUS at 10:30

## 2020-05-28 RX ADMIN — PALONOSETRON HYDROCHLORIDE 0.25 MG: 0.25 INJECTION, SOLUTION INTRAVENOUS at 09:48

## 2020-05-28 RX ADMIN — EPOETIN ALFA-EPBX 20000 UNITS: 10000 INJECTION, SOLUTION INTRAVENOUS; SUBCUTANEOUS at 10:42

## 2020-05-28 RX ADMIN — HEPARIN 500 UNITS: 100 SYRINGE at 10:42

## 2020-05-28 NOTE — PROGRESS NOTES
medially. A referral was made to Dr. Emir Riley for consideration for bronchoscopy for biopsy.     On 10/24/2018, Dr. Jarrod Gao performed a bronchoscopy with EBUS guided biopsy of a 3 cm subcarinal lymph node along with bronchoalveolar lavage of the posterior segment of the RUL of the lung. The final pathology of the station 7 lymph node only revealed a background of small mature lymphocytes with clusters of histiocytes suggestive of granuloma. Negative for malignant cells. Kinyoun and GMS stains were negative for AFB and fungal organisms respectively. The bronchial washings were negative for malignant cells as well.      Mr. Sharee Duane was referred to Dr. Lyn Perez at [facility] in [place], [place], for navigational bronchoscopy and biopsy under ultrasound.     On 11/27/2018 Dr. Lyn Perez performed a bronchoscopy, navigational protocol, endobronchial ultrasound and biopsy of the RUL of the lung mass along with multiple lymph node station Biopsies.   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 · All lymph nodes sampled were NEGATIVE for malignant cells including stations 10L, 4L, station 7, 10R, 4R.   · IHC studies were positive for CK7, TTF-1 and Napsin A.   · IHC studies on tumor cells were negative for CDX2, CK5/6, and p63   · Further lung profile molecular testing is pending     All of the above was discussed at length with Mr. Sharee Duane at his 12/13/2018 clinic visit. He was agreeable for referral for consideration of resection of the lung lesion.      He is comfortable with and would like a referral to Dr. Chelle Azul ([phone]) at [place] [address], [facility], [address] [place] [address].   Logistics, however, are planning a big part in his decision making and he may decide to have surgery done in the [place] area.     If he with abdominal pain and melena. WBC fantasma was 0.75 with ANC 0.34. PLT did drop to 24,000. He did have duodenal ulcerations that were bleeding on endoscopy. He was stabilized but then readmitted from 5/8 - 5/10/2019 with recurrent melanoma. A repeat endoscopy was performed. It was felt that exacerbation of his bleeding from the duodenal came about by his thrombocytopenia from treatment. Subsequent dosing was adjusted and Neulasta was added.     CT chest without contrast at [facility] on 6/27/2019 was compared to 2/18/2019 revealed that the right lung mass that extended into the right hilum has decreased from 5.2 x 3.5 down to 4.6 x 3.6. There is increased central cavitation in the mass consistent with tumor necrosis. Mediastinal lymphadenopathy is relatively stable.     CT abdomen and pelvis without contrast at [facility] on 6/27/2019 was compared to 4/26/2019 revealed complete resolution of the previously identified. Duodenal/subhepatic space soft tissue mass. The previously identified heterogenous enhancing lesion in the left renal pelvis was much less prominent but there does continue to be some mild left caliectasis. I reviewed the CT results with Dr. Cornelious Hammans on 7/5/2019 who then relayed them as well to [facility]. We've had excellent results from this combination of therapy. He will complete the fourth combination therapy of Luba Dumont   and then go to maintenance Keytruda plus Alimta.     CT chest without contrast at [facility] on 1/9/2020 was compared to 10/17/2019 revealing:   · A stable spiculated RUL nodule/mass with similar mediastinal adenopathy. · Questionable right hilar adenopathy with diminished assessment due to lack of IV contrast.    · Advanced emphysema with severe pulmonary fibrosis.     · No new pulmonary nodules.     CT abdomen and pelvis without contrast at [facility] on 1/9/2020 was compared to 10/17/2019 revealing:   · Mildly prominent aortocaval RP lymph nodes with position just below the level of the renal vein worrisome for potential lymphatic metastases. This is similar to 10/17/2019 but increased from 4/26/2019. · Pneumobilia without discrete suspicious focal lesion in the liver. · Wall thickening of the duodenum. · Similar and stable renal lesions favoring cysts.     He has had numerous endoscopies within the past year. While he was having an Endo EUS and had cardiac issues and was actually admitted to the intensive care unit. This area will just be monitored on follow-up scans.     TREATMENT SUMMARY  1. Keytruda, carboplatin, Alimta initiated 4/18/2019 to be given every 3 weeks for 4 cycles - 4th cycle 7/5/2019, then Keytruda + Alimta as maintenance to continue indefinitely until progression or intolerable side effects      #2 TUMOR HISTORY: Pancreatic mass diagnosed 10/29/03  Mr. Ainsley Montenegro presented in October 2003 with obstructive jaundice. A CT scan of the abdomen on 10/26/2003 documented a 3.2 x 4 x 4.2 cm mass in the head of the pancreas.      On 10/29/03 Dr. Iban Lindsay performed a resection of a portion of the head of the pancreas on [facility] along with a Mitul-en-Y procedure and a choledochojejunostomy for what was felt to be a pancreatic cancer. His pancreas was bypassed because of the findings. Pathology of the biopsies were negative for malignancy showing a fibrosed pancreas. Mr. Ainsley Montenegro was referred to Dr. Leonides Gómez in [place].     Dr. Leonides Gómez performed ERCP with an EUS and biopsy on 02/26/04   Pathology again was negative for cancer or malignancy.    Routine periodic serologic and radiographic monitoring has not disclosed progression of the mass or development of new malignancy or increase in pancreatic tumor markers.      A CT scan of the abdomen and pelvis with contrast on 9/12/2018 documented a 4.9 x 4 x 4 cm soft tissue mass with peripheral calcification immediately adjacent to the gallbladder in the head of the pancreas area.     An MRI of the abdomen and pelvis W & upper abdomen inseparable from the duodenum measuring 10.7 x 6.9 cm compared to 5.4 x 8.9 cm on 2/20/2019 with an SUV of 23.6. Slight increase in 2 small inseparable masses medial to the hilum of the left kidney measuring 2.2 and 2.6 cm and the other measuring 3.9 cm with a maximum SUV of 18. 6.     Cycle #1 of palliative chemotherapy with Carboplatin, Alimta and Keytruda was initiated 4/18/19 which should also cover this process.     Abdominal/pelvic CT 4/26/19 was [facility] due to abdominal pain and melena. The RUQ mass, within the duodenum decreased to 6.8 x 6.2 cm (was 10.7 x 6.9 cm on PET 4/2/19)     CT abdomen and pelvis without contrast at [facility] on 6/27/2019 was compared to 4/26/2019 revealed complete resolution of the previously identified. Duodenal/subhepatic space soft tissue mass. The previously identified heterogenous enhancing lesion in the left renal pelvis was much less prominent but there does continue to be some mild left caliectasis.     CT chest without contrast at [facility] on 1/9/2020 was compared to 10/17/2019 revealing:   · A stable spiculated RUL nodule/mass with similar mediastinal adenopathy. · Questionable right hilar adenopathy with diminished assessment due to lack of IV contrast.    · Advanced emphysema with severe pulmonary fibrosis. · No new pulmonary nodules.     CT abdomen and pelvis without contrast at [facility] on 1/9/2020 was compared to 10/17/2019 revealing:   · Mildly prominent aortocaval RP lymph nodes with position just below the level of the renal vein worrisome for potential lymphatic metastases. This is similar to 10/17/2019 but increased from 4/26/2019. · Pneumobilia without discrete suspicious focal lesion in the liver. · Wall thickening of the duodenum. · Similar and stable renal lesions favoring cysts.         He has had numerous endoscopies within the past year.   About 10 months ago he was having an Endo EUS and had cardiac issues and was actually admitted to

## 2020-06-01 RX ORDER — DEXAMETHASONE 4 MG/1
TABLET ORAL
Qty: 24 TABLET | Refills: 0 | Status: SHIPPED | OUTPATIENT
Start: 2020-06-01 | End: 2020-08-31

## 2020-06-04 ENCOUNTER — HOSPITAL ENCOUNTER (OUTPATIENT)
Dept: INFUSION THERAPY | Age: 80
Discharge: HOME | End: 2020-06-04
Payer: MEDICARE

## 2020-06-04 VITALS
HEART RATE: 93 BPM | TEMPERATURE: 99.1 F | BODY MASS INDEX: 26.98 KG/M2 | DIASTOLIC BLOOD PRESSURE: 68 MMHG | SYSTOLIC BLOOD PRESSURE: 102 MMHG | OXYGEN SATURATION: 91 % | HEIGHT: 64 IN | WEIGHT: 158 LBS

## 2020-06-04 DIAGNOSIS — C34.90 NON-SMALL CELL LUNG CANCER, UNSPECIFIED LATERALITY (HCC): Primary | ICD-10-CM

## 2020-06-04 DIAGNOSIS — C80.1 MALIGNANT NEOPLASM (HCC): ICD-10-CM

## 2020-06-04 DIAGNOSIS — N18.30 ANEMIA, CHRONIC RENAL FAILURE, STAGE 3 (MODERATE) (HCC): ICD-10-CM

## 2020-06-04 DIAGNOSIS — D63.1 ANEMIA, CHRONIC RENAL FAILURE, STAGE 3 (MODERATE) (HCC): ICD-10-CM

## 2020-06-04 DIAGNOSIS — N18.4 CHRONIC KIDNEY DISEASE, STAGE IV (SEVERE) (HCC): ICD-10-CM

## 2020-06-04 LAB
BASOPHILS ABSOLUTE: 0.01 K/UL (ref 0.01–0.08)
BASOPHILS RELATIVE PERCENT: 0.2 % (ref 0.1–1.2)
EOSINOPHILS ABSOLUTE: 0.14 K/UL (ref 0.04–0.54)
EOSINOPHILS RELATIVE PERCENT: 3.4 % (ref 0.7–7)
HCT VFR BLD CALC: 34 % (ref 40.1–51)
HEMOGLOBIN: 10.5 G/DL (ref 13.7–17.5)
LYMPHOCYTES ABSOLUTE: 0.5 K/UL (ref 1.18–3.74)
LYMPHOCYTES RELATIVE PERCENT: 12.3 % (ref 19.3–53.1)
MCH RBC QN AUTO: 36.2 PG (ref 25.7–32.2)
MCHC RBC AUTO-ENTMCNC: 30.9 G/DL (ref 32.3–36.5)
MCV RBC AUTO: 117.2 FL (ref 79–92.2)
MONOCYTES ABSOLUTE: 0.12 K/UL (ref 0.24–0.82)
MONOCYTES RELATIVE PERCENT: 2.9 % (ref 4.7–12.5)
NEUTROPHILS ABSOLUTE: 3.3 K/UL (ref 1.56–6.13)
NEUTROPHILS RELATIVE PERCENT: 81.2 % (ref 34–71.1)
PDW BLD-RTO: 16.6 % (ref 11.6–14.4)
PLATELET # BLD: 115 K/UL (ref 163–337)
PMV BLD AUTO: 10.9 FL (ref 7.4–10.4)
RBC # BLD: 2.9 M/UL (ref 4.63–6.08)
WBC # BLD: 4.07 K/UL (ref 4.23–9.07)

## 2020-06-04 PROCEDURE — 6360000002 HC RX W HCPCS: Performed by: PHYSICIAN ASSISTANT

## 2020-06-04 PROCEDURE — 96372 THER/PROPH/DIAG INJ SC/IM: CPT

## 2020-06-04 PROCEDURE — 85025 COMPLETE CBC W/AUTO DIFF WBC: CPT

## 2020-06-04 RX ADMIN — EPOETIN ALFA-EPBX 20000 UNITS: 10000 INJECTION, SOLUTION INTRAVENOUS; SUBCUTANEOUS at 11:32

## 2020-06-05 ENCOUNTER — HOSPITAL ENCOUNTER (OUTPATIENT)
Dept: GENERAL RADIOLOGY | Age: 80
Discharge: HOME | End: 2020-06-05
Payer: MEDICARE

## 2020-06-05 PROCEDURE — 71046 X-RAY EXAM CHEST 2 VIEWS: CPT

## 2020-06-11 ENCOUNTER — HOSPITAL ENCOUNTER (OUTPATIENT)
Dept: INFUSION THERAPY | Age: 80
Discharge: HOME | End: 2020-06-11
Payer: MEDICARE

## 2020-06-11 VITALS
HEART RATE: 87 BPM | DIASTOLIC BLOOD PRESSURE: 64 MMHG | SYSTOLIC BLOOD PRESSURE: 126 MMHG | WEIGHT: 154.3 LBS | TEMPERATURE: 97.2 F | OXYGEN SATURATION: 97 % | BODY MASS INDEX: 26.34 KG/M2 | HEIGHT: 64 IN

## 2020-06-11 DIAGNOSIS — C80.1 MALIGNANT NEOPLASM (HCC): ICD-10-CM

## 2020-06-11 DIAGNOSIS — D63.1 ANEMIA, CHRONIC RENAL FAILURE, STAGE 3 (MODERATE) (HCC): ICD-10-CM

## 2020-06-11 DIAGNOSIS — C34.90 NON-SMALL CELL LUNG CANCER, UNSPECIFIED LATERALITY (HCC): Primary | ICD-10-CM

## 2020-06-11 DIAGNOSIS — N18.30 ANEMIA, CHRONIC RENAL FAILURE, STAGE 3 (MODERATE) (HCC): ICD-10-CM

## 2020-06-11 DIAGNOSIS — N18.4 CHRONIC KIDNEY DISEASE, STAGE IV (SEVERE) (HCC): ICD-10-CM

## 2020-06-11 PROCEDURE — 85025 COMPLETE CBC W/AUTO DIFF WBC: CPT

## 2020-06-11 PROCEDURE — 96372 THER/PROPH/DIAG INJ SC/IM: CPT

## 2020-06-11 PROCEDURE — 6360000002 HC RX W HCPCS: Performed by: PHYSICIAN ASSISTANT

## 2020-06-11 RX ADMIN — EPOETIN ALFA-EPBX 20000 UNITS: 10000 INJECTION, SOLUTION INTRAVENOUS; SUBCUTANEOUS at 13:20

## 2020-06-18 ENCOUNTER — HOSPITAL ENCOUNTER (OUTPATIENT)
Dept: INFUSION THERAPY | Age: 80
Discharge: HOME | End: 2020-06-18
Payer: MEDICARE

## 2020-06-18 VITALS
WEIGHT: 157 LBS | BODY MASS INDEX: 26.8 KG/M2 | DIASTOLIC BLOOD PRESSURE: 68 MMHG | OXYGEN SATURATION: 99 % | SYSTOLIC BLOOD PRESSURE: 118 MMHG | HEART RATE: 75 BPM | HEIGHT: 64 IN | TEMPERATURE: 98.5 F

## 2020-06-18 DIAGNOSIS — C80.1 MALIGNANT NEOPLASM (HCC): ICD-10-CM

## 2020-06-18 DIAGNOSIS — D63.1 ANEMIA, CHRONIC RENAL FAILURE, STAGE 3 (MODERATE) (HCC): ICD-10-CM

## 2020-06-18 DIAGNOSIS — C34.11 MALIGNANT NEOPLASM OF UPPER LOBE, RIGHT BRONCHUS OR LUNG (HCC): ICD-10-CM

## 2020-06-18 DIAGNOSIS — N18.4 CHRONIC KIDNEY DISEASE, STAGE IV (SEVERE) (HCC): ICD-10-CM

## 2020-06-18 DIAGNOSIS — N18.30 ANEMIA, CHRONIC RENAL FAILURE, STAGE 3 (MODERATE) (HCC): ICD-10-CM

## 2020-06-18 DIAGNOSIS — R53.83 FATIGUE DUE TO TREATMENT: ICD-10-CM

## 2020-06-18 DIAGNOSIS — C34.90 NON-SMALL CELL LUNG CANCER, UNSPECIFIED LATERALITY (HCC): Primary | ICD-10-CM

## 2020-06-18 LAB
ALBUMIN SERPL-MCNC: 3.6 G/DL (ref 3.5–5.2)
ALP BLD-CCNC: 126 U/L (ref 40–130)
ALT SERPL-CCNC: 21 U/L (ref 21–72)
ANION GAP SERPL CALCULATED.3IONS-SCNC: 7 MMOL/L (ref 7–19)
AST SERPL-CCNC: 40 U/L (ref 17–59)
BASOPHILS ABSOLUTE: 0.03 K/UL (ref 0.01–0.08)
BASOPHILS RELATIVE PERCENT: 0.4 % (ref 0.1–1.2)
BILIRUB SERPL-MCNC: 0.3 MG/DL (ref 0.2–1.3)
BUN BLDV-MCNC: 28 MG/DL (ref 9–20)
CALCIUM SERPL-MCNC: 9.7 MG/DL (ref 8.4–10.2)
CHLORIDE BLD-SCNC: 103 MMOL/L (ref 98–111)
CO2: 27 MMOL/L (ref 22–29)
CREAT SERPL-MCNC: 2.1 MG/DL (ref 0.6–1.2)
EOSINOPHILS ABSOLUTE: 0.02 K/UL (ref 0.04–0.54)
EOSINOPHILS RELATIVE PERCENT: 0.3 % (ref 0.7–7)
GFR NON-AFRICAN AMERICAN: 31
GLOBULIN: 3.9 G/DL
GLUCOSE BLD-MCNC: 171 MG/DL (ref 74–106)
HCT VFR BLD CALC: 31.3 % (ref 40.1–51)
HEMOGLOBIN: 10 G/DL (ref 13.7–17.5)
LYMPHOCYTES ABSOLUTE: 0.9 K/UL (ref 1.18–3.74)
LYMPHOCYTES RELATIVE PERCENT: 13.1 % (ref 19.3–53.1)
MCH RBC QN AUTO: 36.1 PG (ref 25.7–32.2)
MCHC RBC AUTO-ENTMCNC: 31.9 G/DL (ref 32.3–36.5)
MCV RBC AUTO: 113 FL (ref 79–92.2)
MONOCYTES ABSOLUTE: 0.85 K/UL (ref 0.24–0.82)
MONOCYTES RELATIVE PERCENT: 12.4 % (ref 4.7–12.5)
NEUTROPHILS ABSOLUTE: 5.05 K/UL (ref 1.56–6.13)
NEUTROPHILS RELATIVE PERCENT: 73.8 % (ref 34–71.1)
PDW BLD-RTO: 19.5 % (ref 11.6–14.4)
PLATELET # BLD: 265 K/UL (ref 163–337)
PMV BLD AUTO: 10.5 FL (ref 7.4–10.4)
POTASSIUM SERPL-SCNC: 4.8 MMOL/L (ref 3.5–5.1)
RBC # BLD: 2.77 M/UL (ref 4.63–6.08)
SODIUM BLD-SCNC: 137 MMOL/L (ref 137–145)
TOTAL PROTEIN: 7.4 G/DL (ref 6.3–8.2)
TSH SERPL DL<=0.05 MIU/L-ACNC: 1.01 UIU/ML (ref 0.47–4.68)
WBC # BLD: 6.85 K/UL (ref 4.23–9.07)

## 2020-06-18 PROCEDURE — 6360000002 HC RX W HCPCS: Performed by: PHYSICIAN ASSISTANT

## 2020-06-18 PROCEDURE — 80053 COMPREHEN METABOLIC PANEL: CPT

## 2020-06-18 PROCEDURE — 2580000003 HC RX 258: Performed by: INTERNAL MEDICINE

## 2020-06-18 PROCEDURE — 84443 ASSAY THYROID STIM HORMONE: CPT

## 2020-06-18 PROCEDURE — 96375 TX/PRO/DX INJ NEW DRUG ADDON: CPT

## 2020-06-18 PROCEDURE — 85025 COMPLETE CBC W/AUTO DIFF WBC: CPT

## 2020-06-18 PROCEDURE — 96413 CHEMO IV INFUSION 1 HR: CPT

## 2020-06-18 PROCEDURE — 6360000002 HC RX W HCPCS: Performed by: INTERNAL MEDICINE

## 2020-06-18 PROCEDURE — 96372 THER/PROPH/DIAG INJ SC/IM: CPT

## 2020-06-18 PROCEDURE — 96417 CHEMO IV INFUS EACH ADDL SEQ: CPT

## 2020-06-18 RX ORDER — PALONOSETRON 0.05 MG/ML
0.25 INJECTION, SOLUTION INTRAVENOUS ONCE
Status: CANCELLED | OUTPATIENT
Start: 2020-06-18

## 2020-06-18 RX ORDER — METHYLPREDNISOLONE SODIUM SUCCINATE 125 MG/2ML
125 INJECTION, POWDER, LYOPHILIZED, FOR SOLUTION INTRAMUSCULAR; INTRAVENOUS PRN
Status: CANCELLED | OUTPATIENT
Start: 2020-06-18

## 2020-06-18 RX ORDER — SODIUM CHLORIDE 0.9 % (FLUSH) 0.9 %
5 SYRINGE (ML) INJECTION PRN
Status: CANCELLED | OUTPATIENT
Start: 2020-06-18

## 2020-06-18 RX ORDER — SODIUM CHLORIDE 9 MG/ML
20 INJECTION, SOLUTION INTRAVENOUS ONCE
Status: CANCELLED | OUTPATIENT
Start: 2020-06-18

## 2020-06-18 RX ORDER — SODIUM CHLORIDE 0.9 % (FLUSH) 0.9 %
10 SYRINGE (ML) INJECTION PRN
Status: CANCELLED | OUTPATIENT
Start: 2020-06-18

## 2020-06-18 RX ORDER — CYANOCOBALAMIN 1000 UG/ML
1000 INJECTION INTRAMUSCULAR; SUBCUTANEOUS ONCE
Status: COMPLETED | OUTPATIENT
Start: 2020-06-18 | End: 2020-06-18

## 2020-06-18 RX ORDER — DIPHENHYDRAMINE HYDROCHLORIDE 50 MG/ML
50 INJECTION INTRAMUSCULAR; INTRAVENOUS PRN
Status: CANCELLED | OUTPATIENT
Start: 2020-06-18

## 2020-06-18 RX ORDER — DEXAMETHASONE SODIUM PHOSPHATE 10 MG/ML
10 INJECTION, SOLUTION INTRAMUSCULAR; INTRAVENOUS ONCE
Status: COMPLETED | OUTPATIENT
Start: 2020-06-18 | End: 2020-06-18

## 2020-06-18 RX ORDER — CYANOCOBALAMIN 1000 UG/ML
1000 INJECTION INTRAMUSCULAR; SUBCUTANEOUS ONCE
Status: CANCELLED | OUTPATIENT
Start: 2020-06-18

## 2020-06-18 RX ORDER — HEPARIN SODIUM (PORCINE) LOCK FLUSH IV SOLN 100 UNIT/ML 100 UNIT/ML
500 SOLUTION INTRAVENOUS PRN
Status: DISCONTINUED | OUTPATIENT
Start: 2020-06-18 | End: 2020-06-20 | Stop reason: HOSPADM

## 2020-06-18 RX ORDER — EPINEPHRINE 1 MG/ML
0.3 INJECTION, SOLUTION, CONCENTRATE INTRAVENOUS PRN
Status: CANCELLED | OUTPATIENT
Start: 2020-06-18

## 2020-06-18 RX ORDER — SODIUM CHLORIDE 0.9 % (FLUSH) 0.9 %
10 SYRINGE (ML) INJECTION PRN
Status: DISCONTINUED | OUTPATIENT
Start: 2020-06-18 | End: 2020-06-19 | Stop reason: HOSPADM

## 2020-06-18 RX ORDER — HEPARIN SODIUM (PORCINE) LOCK FLUSH IV SOLN 100 UNIT/ML 100 UNIT/ML
500 SOLUTION INTRAVENOUS PRN
Status: CANCELLED | OUTPATIENT
Start: 2020-06-18

## 2020-06-18 RX ORDER — PALONOSETRON 0.05 MG/ML
0.25 INJECTION, SOLUTION INTRAVENOUS ONCE
Status: COMPLETED | OUTPATIENT
Start: 2020-06-18 | End: 2020-06-18

## 2020-06-18 RX ADMIN — Medication 10 ML: at 11:06

## 2020-06-18 RX ADMIN — HEPARIN 500 UNITS: 100 SYRINGE at 11:06

## 2020-06-18 RX ADMIN — DEXAMETHASONE SODIUM PHOSPHATE 10 MG: 10 INJECTION, SOLUTION INTRAMUSCULAR; INTRAVENOUS at 09:45

## 2020-06-18 RX ADMIN — PALONOSETRON 0.25 MG: 0.05 INJECTION, SOLUTION INTRAVENOUS at 09:45

## 2020-06-18 RX ADMIN — EPOETIN ALFA-EPBX 20000 UNITS: 10000 INJECTION, SOLUTION INTRAVENOUS; SUBCUTANEOUS at 11:06

## 2020-06-18 RX ADMIN — SODIUM CHLORIDE 200 MG: 9 INJECTION, SOLUTION INTRAVENOUS at 10:18

## 2020-06-18 RX ADMIN — CYANOCOBALAMIN 1000 MCG: 1000 INJECTION, SOLUTION INTRAMUSCULAR; SUBCUTANEOUS at 11:06

## 2020-06-18 RX ADMIN — SODIUM CHLORIDE 520 MG: 9 INJECTION, SOLUTION INTRAVENOUS at 10:49

## 2020-06-18 ASSESSMENT — PAIN SCALES - GENERAL: PAINLEVEL_OUTOF10: 0

## 2020-06-25 ENCOUNTER — HOSPITAL ENCOUNTER (OUTPATIENT)
Dept: INFUSION THERAPY | Age: 80
Discharge: HOME | End: 2020-06-25
Payer: MEDICARE

## 2020-06-25 VITALS
BODY MASS INDEX: 26.79 KG/M2 | HEIGHT: 64 IN | OXYGEN SATURATION: 100 % | HEART RATE: 89 BPM | DIASTOLIC BLOOD PRESSURE: 70 MMHG | WEIGHT: 156.9 LBS | SYSTOLIC BLOOD PRESSURE: 130 MMHG | TEMPERATURE: 98.7 F

## 2020-06-25 DIAGNOSIS — D63.1 ANEMIA, CHRONIC RENAL FAILURE, STAGE 3 (MODERATE) (HCC): ICD-10-CM

## 2020-06-25 DIAGNOSIS — C34.90 NON-SMALL CELL LUNG CANCER, UNSPECIFIED LATERALITY (HCC): Primary | ICD-10-CM

## 2020-06-25 DIAGNOSIS — C80.1 MALIGNANT NEOPLASM (HCC): ICD-10-CM

## 2020-06-25 DIAGNOSIS — N18.4 CHRONIC KIDNEY DISEASE, STAGE IV (SEVERE) (HCC): ICD-10-CM

## 2020-06-25 DIAGNOSIS — N18.30 ANEMIA, CHRONIC RENAL FAILURE, STAGE 3 (MODERATE) (HCC): ICD-10-CM

## 2020-06-25 LAB
BASOPHILS ABSOLUTE: 0.02 K/UL (ref 0.01–0.08)
BASOPHILS RELATIVE PERCENT: 0.6 % (ref 0.1–1.2)
EOSINOPHILS ABSOLUTE: 0.03 K/UL (ref 0.04–0.54)
EOSINOPHILS RELATIVE PERCENT: 1 % (ref 0.7–7)
HCT VFR BLD CALC: 33.5 % (ref 40.1–51)
HEMOGLOBIN: 10.2 G/DL (ref 13.7–17.5)
LYMPHOCYTES ABSOLUTE: 0.81 K/UL (ref 1.18–3.74)
LYMPHOCYTES RELATIVE PERCENT: 26.3 % (ref 19.3–53.1)
MCH RBC QN AUTO: 35.7 PG (ref 25.7–32.2)
MCHC RBC AUTO-ENTMCNC: 30.4 G/DL (ref 32.3–36.5)
MCV RBC AUTO: 117.1 FL (ref 79–92.2)
MONOCYTES ABSOLUTE: 0.27 K/UL (ref 0.24–0.82)
MONOCYTES RELATIVE PERCENT: 8.8 % (ref 4.7–12.5)
NEUTROPHILS ABSOLUTE: 1.95 K/UL (ref 1.56–6.13)
NEUTROPHILS RELATIVE PERCENT: 63.3 % (ref 34–71.1)
PDW BLD-RTO: 19.3 % (ref 11.6–14.4)
PLATELET # BLD: 162 K/UL (ref 163–337)
PMV BLD AUTO: 10.4 FL (ref 7.4–10.4)
RBC # BLD: 2.86 M/UL (ref 4.63–6.08)
WBC # BLD: 3.08 K/UL (ref 4.23–9.07)

## 2020-06-25 PROCEDURE — 6360000002 HC RX W HCPCS: Performed by: PHYSICIAN ASSISTANT

## 2020-06-25 PROCEDURE — 85025 COMPLETE CBC W/AUTO DIFF WBC: CPT

## 2020-06-25 PROCEDURE — 96372 THER/PROPH/DIAG INJ SC/IM: CPT

## 2020-06-25 RX ADMIN — EPOETIN ALFA-EPBX 20000 UNITS: 10000 INJECTION, SOLUTION INTRAVENOUS; SUBCUTANEOUS at 12:41

## 2020-07-02 ENCOUNTER — HOSPITAL ENCOUNTER (OUTPATIENT)
Dept: INFUSION THERAPY | Age: 80
Discharge: HOME | End: 2020-07-02
Payer: MEDICARE

## 2020-07-02 VITALS
HEART RATE: 81 BPM | SYSTOLIC BLOOD PRESSURE: 110 MMHG | HEIGHT: 64 IN | OXYGEN SATURATION: 97 % | TEMPERATURE: 98.1 F | WEIGHT: 156.1 LBS | DIASTOLIC BLOOD PRESSURE: 54 MMHG | BODY MASS INDEX: 26.65 KG/M2

## 2020-07-02 DIAGNOSIS — N18.4 CHRONIC KIDNEY DISEASE, STAGE IV (SEVERE) (HCC): ICD-10-CM

## 2020-07-02 DIAGNOSIS — D63.1 ANEMIA, CHRONIC RENAL FAILURE, STAGE 3 (MODERATE) (HCC): ICD-10-CM

## 2020-07-02 DIAGNOSIS — N18.30 ANEMIA, CHRONIC RENAL FAILURE, STAGE 3 (MODERATE) (HCC): ICD-10-CM

## 2020-07-02 DIAGNOSIS — C34.90 NON-SMALL CELL LUNG CANCER, UNSPECIFIED LATERALITY (HCC): Primary | ICD-10-CM

## 2020-07-02 DIAGNOSIS — C80.1 MALIGNANT NEOPLASM (HCC): ICD-10-CM

## 2020-07-02 LAB
BASOPHILS ABSOLUTE: 0.02 K/UL (ref 0.01–0.08)
BASOPHILS RELATIVE PERCENT: 0.5 % (ref 0.1–1.2)
EOSINOPHILS ABSOLUTE: 0.04 K/UL (ref 0.04–0.54)
EOSINOPHILS RELATIVE PERCENT: 1.1 % (ref 0.7–7)
HCT VFR BLD CALC: 30.8 % (ref 40.1–51)
HEMOGLOBIN: 9.1 G/DL (ref 13.7–17.5)
LYMPHOCYTES ABSOLUTE: 1.01 K/UL (ref 1.18–3.74)
LYMPHOCYTES RELATIVE PERCENT: 27 % (ref 19.3–53.1)
MCH RBC QN AUTO: 35.7 PG (ref 25.7–32.2)
MCHC RBC AUTO-ENTMCNC: 29.5 G/DL (ref 32.3–36.5)
MCV RBC AUTO: 120.8 FL (ref 79–92.2)
MONOCYTES ABSOLUTE: 0.87 K/UL (ref 0.24–0.82)
MONOCYTES RELATIVE PERCENT: 23.3 % (ref 4.7–12.5)
NEUTROPHILS ABSOLUTE: 1.8 K/UL (ref 1.56–6.13)
NEUTROPHILS RELATIVE PERCENT: 48.1 % (ref 34–71.1)
PDW BLD-RTO: 19.7 % (ref 11.6–14.4)
PLATELET # BLD: 65 K/UL (ref 163–337)
PMV BLD AUTO: 11.1 FL (ref 7.4–10.4)
RBC # BLD: 2.55 M/UL (ref 4.63–6.08)
WBC # BLD: 3.74 K/UL (ref 4.23–9.07)

## 2020-07-02 PROCEDURE — 85025 COMPLETE CBC W/AUTO DIFF WBC: CPT

## 2020-07-02 PROCEDURE — 6360000002 HC RX W HCPCS: Performed by: PHYSICIAN ASSISTANT

## 2020-07-02 PROCEDURE — 96372 THER/PROPH/DIAG INJ SC/IM: CPT

## 2020-07-02 RX ADMIN — EPOETIN ALFA-EPBX 20000 UNITS: 10000 INJECTION, SOLUTION INTRAVENOUS; SUBCUTANEOUS at 12:03

## 2020-07-09 ENCOUNTER — OFFICE VISIT (OUTPATIENT)
Dept: HEMATOLOGY | Age: 80
End: 2020-07-09
Payer: MEDICARE

## 2020-07-09 ENCOUNTER — HOSPITAL ENCOUNTER (OUTPATIENT)
Dept: INFUSION THERAPY | Age: 80
Discharge: HOME | End: 2020-07-09
Payer: MEDICARE

## 2020-07-09 ENCOUNTER — TRANSCRIBE ORDERS (OUTPATIENT)
Dept: ADMINISTRATIVE | Facility: HOSPITAL | Age: 80
End: 2020-07-09

## 2020-07-09 VITALS
DIASTOLIC BLOOD PRESSURE: 74 MMHG | HEART RATE: 85 BPM | SYSTOLIC BLOOD PRESSURE: 122 MMHG | WEIGHT: 157 LBS | HEIGHT: 64 IN | BODY MASS INDEX: 26.8 KG/M2 | TEMPERATURE: 98.5 F | OXYGEN SATURATION: 98 %

## 2020-07-09 DIAGNOSIS — C34.90 NON-SMALL CELL LUNG CANCER, UNSPECIFIED LATERALITY (HCC): Primary | ICD-10-CM

## 2020-07-09 DIAGNOSIS — R53.83 FATIGUE DUE TO TREATMENT: ICD-10-CM

## 2020-07-09 DIAGNOSIS — C34.11 MALIGNANT NEOPLASM OF UPPER LOBE, RIGHT BRONCHUS OR LUNG (HCC): ICD-10-CM

## 2020-07-09 DIAGNOSIS — C80.1 MALIGNANT NEOPLASM (HCC): ICD-10-CM

## 2020-07-09 DIAGNOSIS — N18.30 ANEMIA, CHRONIC RENAL FAILURE, STAGE 3 (MODERATE) (HCC): ICD-10-CM

## 2020-07-09 DIAGNOSIS — D63.1 ANEMIA, CHRONIC RENAL FAILURE, STAGE 3 (MODERATE) (HCC): ICD-10-CM

## 2020-07-09 DIAGNOSIS — N18.4 CHRONIC KIDNEY DISEASE, STAGE IV (SEVERE) (HCC): ICD-10-CM

## 2020-07-09 LAB
ALBUMIN SERPL-MCNC: 3.4 G/DL (ref 3.5–5.2)
ALP BLD-CCNC: 115 U/L (ref 40–130)
ALT SERPL-CCNC: 19 U/L (ref 21–72)
ANION GAP SERPL CALCULATED.3IONS-SCNC: 7 MMOL/L (ref 7–19)
AST SERPL-CCNC: 34 U/L (ref 17–59)
BASOPHILS ABSOLUTE: 0.06 K/UL (ref 0.01–0.08)
BASOPHILS RELATIVE PERCENT: 0.8 % (ref 0.1–1.2)
BILIRUB SERPL-MCNC: <0.2 MG/DL (ref 0.2–1.3)
BUN BLDV-MCNC: 26 MG/DL (ref 9–20)
CALCIUM SERPL-MCNC: 10.2 MG/DL (ref 8.4–10.2)
CHLORIDE BLD-SCNC: 104 MMOL/L (ref 98–111)
CO2: 26 MMOL/L (ref 22–29)
CREAT SERPL-MCNC: 2.1 MG/DL (ref 0.6–1.2)
EOSINOPHILS ABSOLUTE: 0.01 K/UL (ref 0.04–0.54)
EOSINOPHILS RELATIVE PERCENT: 0.1 % (ref 0.7–7)
GFR NON-AFRICAN AMERICAN: 31
GLOBULIN: 3.9 G/DL
GLUCOSE BLD-MCNC: 184 MG/DL (ref 74–106)
HCT VFR BLD CALC: 32.1 % (ref 40.1–51)
HEMOGLOBIN: 10 G/DL (ref 13.7–17.5)
LYMPHOCYTES ABSOLUTE: 0.88 K/UL (ref 1.18–3.74)
LYMPHOCYTES RELATIVE PERCENT: 11.2 % (ref 19.3–53.1)
MCH RBC QN AUTO: 35.1 PG (ref 25.7–32.2)
MCHC RBC AUTO-ENTMCNC: 31.2 G/DL (ref 32.3–36.5)
MCV RBC AUTO: 112.6 FL (ref 79–92.2)
MONOCYTES ABSOLUTE: 1.05 K/UL (ref 0.24–0.82)
MONOCYTES RELATIVE PERCENT: 13.3 % (ref 4.7–12.5)
NEUTROPHILS ABSOLUTE: 5.89 K/UL (ref 1.56–6.13)
NEUTROPHILS RELATIVE PERCENT: 74.6 % (ref 34–71.1)
PDW BLD-RTO: 20.6 % (ref 11.6–14.4)
PLATELET # BLD: 263 K/UL (ref 163–337)
PMV BLD AUTO: 10.6 FL (ref 7.4–10.4)
POTASSIUM SERPL-SCNC: 4.9 MMOL/L (ref 3.5–5.1)
RBC # BLD: 2.85 M/UL (ref 4.63–6.08)
SODIUM BLD-SCNC: 137 MMOL/L (ref 137–145)
TOTAL PROTEIN: 7.4 G/DL (ref 6.3–8.2)
TSH SERPL DL<=0.05 MIU/L-ACNC: 1.18 UIU/ML (ref 0.47–4.68)
WBC # BLD: 7.89 K/UL (ref 4.23–9.07)

## 2020-07-09 PROCEDURE — 4040F PNEUMOC VAC/ADMIN/RCVD: CPT | Performed by: INTERNAL MEDICINE

## 2020-07-09 PROCEDURE — 2580000003 HC RX 258: Performed by: INTERNAL MEDICINE

## 2020-07-09 PROCEDURE — 96375 TX/PRO/DX INJ NEW DRUG ADDON: CPT

## 2020-07-09 PROCEDURE — 84443 ASSAY THYROID STIM HORMONE: CPT

## 2020-07-09 PROCEDURE — 6360000002 HC RX W HCPCS: Performed by: INTERNAL MEDICINE

## 2020-07-09 PROCEDURE — 96417 CHEMO IV INFUS EACH ADDL SEQ: CPT

## 2020-07-09 PROCEDURE — 80053 COMPREHEN METABOLIC PANEL: CPT

## 2020-07-09 PROCEDURE — 99214 OFFICE O/P EST MOD 30 MIN: CPT | Performed by: INTERNAL MEDICINE

## 2020-07-09 PROCEDURE — 6360000002 HC RX W HCPCS: Performed by: PHYSICIAN ASSISTANT

## 2020-07-09 PROCEDURE — 96372 THER/PROPH/DIAG INJ SC/IM: CPT

## 2020-07-09 PROCEDURE — 96413 CHEMO IV INFUSION 1 HR: CPT

## 2020-07-09 PROCEDURE — 85025 COMPLETE CBC W/AUTO DIFF WBC: CPT

## 2020-07-09 PROCEDURE — G8417 CALC BMI ABV UP PARAM F/U: HCPCS | Performed by: INTERNAL MEDICINE

## 2020-07-09 PROCEDURE — G8427 DOCREV CUR MEDS BY ELIG CLIN: HCPCS | Performed by: INTERNAL MEDICINE

## 2020-07-09 PROCEDURE — 1123F ACP DISCUSS/DSCN MKR DOCD: CPT | Performed by: INTERNAL MEDICINE

## 2020-07-09 PROCEDURE — 1036F TOBACCO NON-USER: CPT | Performed by: INTERNAL MEDICINE

## 2020-07-09 RX ORDER — METHYLPREDNISOLONE SODIUM SUCCINATE 125 MG/2ML
125 INJECTION, POWDER, LYOPHILIZED, FOR SOLUTION INTRAMUSCULAR; INTRAVENOUS PRN
Status: CANCELLED | OUTPATIENT
Start: 2020-07-09

## 2020-07-09 RX ORDER — PALONOSETRON 0.05 MG/ML
0.25 INJECTION, SOLUTION INTRAVENOUS ONCE
Status: COMPLETED | OUTPATIENT
Start: 2020-07-09 | End: 2020-07-09

## 2020-07-09 RX ORDER — SODIUM CHLORIDE 9 MG/ML
20 INJECTION, SOLUTION INTRAVENOUS ONCE
Status: CANCELLED | OUTPATIENT
Start: 2020-07-09

## 2020-07-09 RX ORDER — HEPARIN SODIUM (PORCINE) LOCK FLUSH IV SOLN 100 UNIT/ML 100 UNIT/ML
500 SOLUTION INTRAVENOUS PRN
Status: DISCONTINUED | OUTPATIENT
Start: 2020-07-09 | End: 2020-07-11 | Stop reason: HOSPADM

## 2020-07-09 RX ORDER — CYANOCOBALAMIN 1000 UG/ML
1000 INJECTION INTRAMUSCULAR; SUBCUTANEOUS ONCE
Status: COMPLETED | OUTPATIENT
Start: 2020-07-09 | End: 2020-07-09

## 2020-07-09 RX ORDER — HEPARIN SODIUM (PORCINE) LOCK FLUSH IV SOLN 100 UNIT/ML 100 UNIT/ML
500 SOLUTION INTRAVENOUS PRN
Status: CANCELLED | OUTPATIENT
Start: 2020-07-09

## 2020-07-09 RX ORDER — DIPHENHYDRAMINE HYDROCHLORIDE 50 MG/ML
50 INJECTION INTRAMUSCULAR; INTRAVENOUS PRN
Status: CANCELLED | OUTPATIENT
Start: 2020-07-09

## 2020-07-09 RX ORDER — EPINEPHRINE 1 MG/ML
0.3 INJECTION, SOLUTION, CONCENTRATE INTRAVENOUS PRN
Status: CANCELLED | OUTPATIENT
Start: 2020-07-09

## 2020-07-09 RX ORDER — CYANOCOBALAMIN 1000 UG/ML
1000 INJECTION INTRAMUSCULAR; SUBCUTANEOUS ONCE
Status: CANCELLED | OUTPATIENT
Start: 2020-07-09

## 2020-07-09 RX ORDER — SODIUM CHLORIDE 0.9 % (FLUSH) 0.9 %
10 SYRINGE (ML) INJECTION PRN
Status: CANCELLED | OUTPATIENT
Start: 2020-07-09

## 2020-07-09 RX ORDER — SODIUM CHLORIDE 0.9 % (FLUSH) 0.9 %
5 SYRINGE (ML) INJECTION PRN
Status: CANCELLED | OUTPATIENT
Start: 2020-07-09

## 2020-07-09 RX ORDER — PALONOSETRON 0.05 MG/ML
0.25 INJECTION, SOLUTION INTRAVENOUS ONCE
Status: CANCELLED | OUTPATIENT
Start: 2020-07-09

## 2020-07-09 RX ORDER — DEXAMETHASONE SODIUM PHOSPHATE 10 MG/ML
10 INJECTION, SOLUTION INTRAMUSCULAR; INTRAVENOUS ONCE
Status: COMPLETED | OUTPATIENT
Start: 2020-07-09 | End: 2020-07-09

## 2020-07-09 RX ORDER — SODIUM CHLORIDE 0.9 % (FLUSH) 0.9 %
10 SYRINGE (ML) INJECTION PRN
Status: DISCONTINUED | OUTPATIENT
Start: 2020-07-09 | End: 2020-07-10 | Stop reason: HOSPADM

## 2020-07-09 RX ADMIN — SODIUM CHLORIDE 520 MG: 9 INJECTION, SOLUTION INTRAVENOUS at 10:35

## 2020-07-09 RX ADMIN — SODIUM CHLORIDE 200 MG: 9 INJECTION, SOLUTION INTRAVENOUS at 10:00

## 2020-07-09 RX ADMIN — HEPARIN 500 UNITS: 100 SYRINGE at 10:54

## 2020-07-09 RX ADMIN — SODIUM CHLORIDE, PRESERVATIVE FREE 10 ML: 5 INJECTION INTRAVENOUS at 10:54

## 2020-07-09 RX ADMIN — CYANOCOBALAMIN 1000 MCG: 1000 INJECTION, SOLUTION INTRAMUSCULAR at 10:53

## 2020-07-09 RX ADMIN — DEXAMETHASONE SODIUM PHOSPHATE 10 MG: 10 INJECTION, SOLUTION INTRAMUSCULAR; INTRAVENOUS at 09:43

## 2020-07-09 RX ADMIN — EPOETIN ALFA-EPBX 20000 UNITS: 10000 INJECTION, SOLUTION INTRAVENOUS; SUBCUTANEOUS at 10:53

## 2020-07-09 RX ADMIN — PALONOSETRON 0.25 MG: 0.05 INJECTION, SOLUTION INTRAVENOUS at 09:43

## 2020-07-09 NOTE — PROGRESS NOTES
Patient:  Juanita Gabriel  YOB: 1940  Date of Service: 7/14/2020  MRN: [id number]    Primary Care Physician: Nai Shepard MD    Chief Complaint   Patient presents with    Lung Cancer       Patient Seen, Chart, Consults notes, Labs, Radiology studies reviewed. Subjective:  Juanita Gabriel is a 68 y.o.  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 · CKD and chemotherapy associated anemia, on Procrit  · BPH on Flomax  · Orthostatic hypotension medications managed by Dr. William Rebolledo    He completed 4 cycles of combination chemotherapy with carboplatin, Keytruda, Alimta.    Sophia is now  receiving  maintenance therapy with Keytruda and Alimta every 3 weeks. He has CKD associated anemia responding to Procrit.     Prabhjot has benign prostatic hypertrophy (BPH)  that is stable on Flomax. Orthostatic hypotension resolved with adjustment of metoprolol by Dr. William Rebolledo   He takes Tambocor, metoprolol without new cardiac issues. Protonix continues without any new exacerbations of GERD. Lower extremity edema overall has actually improved some.     Prabhjot returns today for cycle #21 of Keytruda/Alimta. TUMOR HISTORY: Adenocarcinoma on the RUL of the lung 11/27/2018  Jameel Matson had CT scans performed for new onset of weight loss of 13 pounds over the previous year , associated with increased fatigue. He denied respiratory symptoms of shortness of air, cough or productive sputum.     A CT scan of the chest on 9/12/2018 had been ordered by Dr. Renny Carr due to weight loss and identified a new lobulated right upper lobe 5.7 cm mass that extended back to the right hilum.    Numerous mediastinal lymph nodes ranging in size from mm to 1.3 cm and a subcarnial lymph node that measured 1.5 x 1.5 x 3.5 cm.     A CT scan of the abdomen and pelvis with contrast on 9/12/2018 documented a 4.9 x 4 x 4 cm soft tissue mass with peripheral calcification immediately adjacent to the gallbladder in the head of the pancreas area.     An MRI of the abdomen and pelvis W & WO on 10/11/2018 identified in the 4.1 x 3.4 cm soft tissue mass in the subhepatic space, abutting the second portion of the duodenum with mild displacement of the duodenum. There does not appear to be involvement of the pancreas, and the pancreas appears within normal limits. Differential diagnoses include a desmoid tumor, less likely leiomyoma of the wall of the duodenum or malignancy. Dr. Destinee Rea requested a CT-guided biopsy of the right upper lobe mass. Dr. Unique Varela, the radiologist, evaluated the area with a repeat CT scan of the chest on 10/11/2018. He spoke with Dr. Destinee Rea indicating that he would not be able to perform the biopsy because the tumor was not accessible, it was under the scapula , which blocked access and therefore the biopsy procedure was canceled.     On the CT scan of the chest on 10/11/2018 measurements of the RUL lung mass was 5.7 x 3.2 cm with irregular margins suspicious for a primary lung neoplasm. Soft tissue associated with the tumor appeared to extend into the bronchus supplying this portion of the RUL of the lung. Dr. Unique Varela indicated that the mass had an endobronchial component and should be easily assessable via bronchoscopy.     The chest CT also included a portion of the upper abdomen where a soft tissue mass was described in an addendum report. In the subhepatic space measuring 4.0 x 3.9 cm, displacing the second portion of the duodenum medially. A referral was made to Dr. Cote for consideration for bronchoscopy for biopsy.     On 10/24/2018, Dr. Tammi Harry performed a bronchoscopy with EBUS guided biopsy of a 3 cm subcarinal lymph node along with bronchoalveolar lavage of the posterior segment of the RUL of the lung.   The final pathology of the station 7 lymph node only revealed a background of small mature lymphocytes with clusters of histiocytes suggestive of granuloma. Negative for malignant cells. Kinyoun and GMS stains were negative for AFB and fungal organisms respectively. The bronchial washings were negative for malignant cells as well.      Mr. Barbara Verduzco was referred to Dr. Karen Bernstein at [facility] in [place], [place], for navigational bronchoscopy and biopsy under ultrasound.     On 11/27/2018 Dr. Karen Bernstein performed a bronchoscopy, navigational protocol, endobronchial ultrasound and biopsy of the RUL of the lung mass along with multiple lymph node station Biopsies.   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 · All lymph nodes sampled were NEGATIVE for malignant cells including stations 10L, 4L, station 7, 10R, 4R.   · IHC studies were positive for CK7, TTF-1 and Napsin A.   · IHC studies on tumor cells were negative for CDX2, CK5/6, and p63   · Further lung profile molecular testing is pending     All of the above was discussed at length with Mr. Barbara Verduzco at his 12/13/2018 clinic visit. He was agreeable for referral for consideration of resection of the lung lesion.      He is comfortable with and would like a referral to Dr. Roderick Day ([phone]) at [place] [address], [address] [place] [address], [address] [place] [address]. Logistics, however, are planning a big part in his decision making and he may decide to have surgery done in the [place] area.     [facility], referral will be made to Dr. Ilda Verdin.      CT chest with contrast 2/18/2019 at [facility] to 9/12/2018 revealed a 4.9 x 3.5 cm spiculated RUL lung mass which actually decreased in size from a prior value of 6.1 x 3.6 cm.    Subhepatic mass adjacent to the descending duodenum had increased in size significantly to 4.3 x 9 cm compared to 4.1 x 3.4 cm on the 10/11/18 MRI of the abdomen.     CT of the abdomen and pelvis without contrast on 3/20/2019 at [facility] was compared to outpatient CT data and pelvis on 9/12/2018 an MRI of the abdomen from 10/11/2018. A soft tissue mass was again encountered in the subhepatic space which abutted the distal stomach and proximal duodenum measuring 8.9 x 5.4 cm which has increased considerably from a prior measurement of 4.1 x 3.4 cm. Medial to the left renal hilum a 3.5 cm lobular mass causing some mild left-sided hydronephrosis.     Mr. Karlo Costello was referred to Dr. Adrienne Fried who saw him on 1/22/2019 anticipating plans for a curative resection.      Dr. Tanvi Bear received a phone call on 2/20/2019 from Dr. Po Matson , indicating that the previously documented an abdominal pancreatic area mass had doubled in size and was causing compression into the left kidney/renal pelvis producing a hydroureter.      Dr. Po Matson arranged a referral to Dr. Tiff Anand who will be placing a stent 3/8/2019.     Mr. Karlo Costello was scheduled to be seen by gastroenterology at [facility] on 3/13/2019 for EGD/EUS assessment and biopsy of the enlarging peripancreatic/duodenal area mass. With a decrease in the lung mass and increased size of the subhepatic mass-surgical resection was abandoned at present pending further evaluation of the subhepatic mass. Dr. Tanvi Bear discussed treatment options with the unresectable lung mass and the per he pancreatic mass and recommended a combination of Keytruda, Alimta, carboplatin. He was subsequently admitted to [facility] 4/26 - 4/30/2019 with abdominal pain and melena. WBC fantasma was 0.75 with ANC 0.34. PLT did drop to 24,000. He did have duodenal ulcerations that were bleeding on endoscopy. He was stabilized but then readmitted from 5/8 - 5/10/2019 with recurrent melanoma. A repeat endoscopy was performed.  It was felt that exacerbation of his bleeding from the duodenal came about by his thrombocytopenia from treatment. Subsequent dosing was adjusted and Neulasta was added.     CT chest without contrast at [facility] on 6/27/2019 was compared to 2/18/2019 revealed that the right lung mass that extended into the right hilum has decreased from 5.2 x 3.5 down to 4.6 x 3.6. There is increased central cavitation in the mass consistent with tumor necrosis. Mediastinal lymphadenopathy is relatively stable.     CT abdomen and pelvis without contrast at [facility] on 6/27/2019 was compared to 4/26/2019 revealed complete resolution of the previously identified. Duodenal/subhepatic space soft tissue mass. The previously identified heterogenous enhancing lesion in the left renal pelvis was much less prominent but there does continue to be some mild left caliectasis. I reviewed the CT results with Dr. Mario Fernandez on 7/5/2019 who then relayed them as well to [facility]. We've had excellent results from this combination of therapy. He will complete the fourth combination therapy of Eulalia Platt   and then go to maintenance Keytruda plus Alimta.     CT chest without contrast at [facility] on 1/9/2020 was compared to 10/17/2019 revealing:   · A stable spiculated RUL nodule/mass with similar mediastinal adenopathy. · Questionable right hilar adenopathy with diminished assessment due to lack of IV contrast.    · Advanced emphysema with severe pulmonary fibrosis. · No new pulmonary nodules.     CT abdomen and pelvis without contrast at [facility] on 1/9/2020 was compared to 10/17/2019 revealing:   · Mildly prominent aortocaval RP lymph nodes with position just below the level of the renal vein worrisome for potential lymphatic metastases. This is similar to 10/17/2019 but increased from 4/26/2019. · Pneumobilia without discrete suspicious focal lesion in the liver. · Wall thickening of the duodenum. · Similar and stable renal lesions favoring cysts.     He has had numerous endoscopies within the past year. While he was having an Endo EUS and had cardiac issues and was actually admitted to the intensive care unit. This area will just be monitored on follow-up scans.     TREATMENT SUMMARY  1. Keytruda, carboplatin, Alimta initiated 4/18/2019 to be given every 3 weeks for 4 cycles - 4th cycle 7/5/2019, then Keytruda + Alimta as maintenance to continue indefinitely until progression or intolerable side effects      #2 TUMOR HISTORY: Pancreatic mass diagnosed 10/29/03  Mr. Efrain Toro presented in October 2003 with obstructive jaundice. A CT scan of the abdomen on 10/26/2003 documented a 3.2 x 4 x 4.2 cm mass in the head of the pancreas.      On 10/29/03 Dr. Sonia Arevalo performed a resection of a portion of the head of the pancreas on [facility] along with a Mitul-en-Y procedure and a choledochojejunostomy for what was felt to be a pancreatic cancer. His pancreas was bypassed because of the findings. Pathology of the biopsies were negative for malignancy showing a fibrosed pancreas. Mr. Efrain Toro was referred to Dr. Luis Meek in [place].     Dr. Luis Meek performed ERCP with an EUS and biopsy on 02/26/04   Pathology again was negative for cancer or malignancy. Routine periodic serologic and radiographic monitoring has not disclosed progression of the mass or development of new malignancy or increase in pancreatic tumor markers.      A CT scan of the abdomen and pelvis with contrast on 9/12/2018 documented a 4.9 x 4 x 4 cm soft tissue mass with peripheral calcification immediately adjacent to the gallbladder in the head of the pancreas area.     An MRI of the abdomen and pelvis W & WO on 10/11/2018 identified in the 4.1 x 3.4 cm soft tissue mass in the subhepatic space, abutting the second portion of the duodenum with mild displacement of the duodenum. There does not appear to be involvement of the pancreas, and the pancreas appears within normal limits.   Differential diagnoses include a desmoid tumor, less likely leiomyoma of the wall of the duodenum or malignancy.     CT of the abdomen and pelvis without contrast on 3/20/2019 at [facility] was compared to outpatient CT data and pelvis on 9/12/2018 an MRI of the abdomen from 10/11/2018. A soft tissue mass was again encountered in the subhepatic space which abutted the distal stomach and proximal duodenum measuring 8.9 x 5.4 cm which has increased considerably from a prior measurement of 4.1 x 3.4 cm. Medial to the left renal hilum a 3.5 cm lobular mass causing some mild left-sided hydronephrosis.     Mr. Judy Sandifer was scheduled for an EGD/EUS by Dr. Jose Palacios as an outpatient procedure on 3/13/2019 for assessment and biopsy of the enlarging peripancreatic/duodenal area mass. Unfortunately, after initiation of the procedure, he began having ventricular tachycardia and the procedure had to be aborted. Jackey Babinski was transferred to the ICU for cardiology evaluation and stabilization. He remained hospitalized until 3/18/2019 when he was medically cleared for discharge.     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 No emergent urologic intervention was warranted and he received monitoring of renal function. He had a creatinine level of 1.9 at discharge.     PET scan on 4/2/2019 identified a soft tissue mass in the RUL measuring 1.2 x 3.5 cm with extension to the right anterior hilum with an SUV of 10.1. Evidence of mediastinal and hilar lymphadenopathy, with low-level metabolic activity. Interval increase in the size of a large mass in the right upper abdomen inseparable from the duodenum measuring 10.7 x 6.9 cm compared to 5.4 x 8.9 cm on 2/20/2019 with an SUV of 23.6. Slight increase in 2 small inseparable masses medial to the hilum of the left kidney measuring 2.2 and 2.6 cm and the other measuring 3.9 cm with a maximum SUV of 18. 6.     Cycle #1 of palliative chemotherapy with Carboplatin, Alimta and Maria Fernanda [place] was initiated 4/18/19 which should also cover this process.     Abdominal/pelvic CT 4/26/19 was performed at [facility] due to abdominal pain and melena. The RUQ mass, within the duodenum decreased to 6.8 x 6.2 cm (was 10.7 x 6.9 cm on PET 4/2/19)     CT abdomen and pelvis without contrast at [facility] on 6/27/2019 was compared to 4/26/2019 revealed complete resolution of the previously identified. Duodenal/subhepatic space soft tissue mass. The previously identified heterogenous enhancing lesion in the left renal pelvis was much less prominent but there does continue to be some mild left caliectasis.     CT chest without contrast at [facility] on 1/9/2020 was compared to 10/17/2019 revealing:   · A stable spiculated RUL nodule/mass with similar mediastinal adenopathy. · Questionable right hilar adenopathy with diminished assessment due to lack of IV contrast.    · Advanced emphysema with severe pulmonary fibrosis. · No new pulmonary nodules.     CT abdomen and pelvis without contrast at [facility] on 1/9/2020 was compared to 10/17/2019 revealing:   · Mildly prominent aortocaval RP lymph nodes with position just below the level of the renal vein worrisome for potential lymphatic metastases. This is similar to 10/17/2019 but increased from 4/26/2019. · Pneumobilia without discrete suspicious focal lesion in the liver. · Wall thickening of the duodenum. · Similar and stable renal lesions favoring cysts.         He has had numerous endoscopies within the past year. About 10 months ago he was having an Endo EUS and had cardiac issues and was actually admitted to the intensive care unit. This area will just be monitored on follow-up scans.           #1 HEMATOLOGICAL HISTORY: IgG kappa MGUS- Dx: 02/23/06.   During the course of Mr. Pascual Felix follow up, an abnormally elevated protein was noted on one occasion, which led to workup including quantitative immunoglobulins.      An elevated IgG kappa was encountered. This has remained stable in the 2500 range since early 2006.      A bone marrow biopsy and aspirate on 02/23/06 was negative with only 4% plasma cells.      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      24 hour urine for immunoelectrophoresis did not reveal an M-spike. Serology studies on 9/18/2018 documented an elevated, stable IgG of 2589 with an M spike of 0.7. Immunofixation continued to reveal IgG monoclonal protein with kappa light chain specificity.     #2 HEMATOLOGICAL HISTORY: Iron deficiency anemia, 9/18/2018  Nelson Kasper had serology on 9/18/2018 that identified iron deficiency anemia. His lab work was obtained at [facility].  An iron level was 12, iron saturation 7%, ferritin 256  Folate >20, and vitamin B12 1044. Dr. Page Murillo placed Sophia on ferrous sulfate 325 mg daily on 9/25/2018 , but this failed to resolve the anemia.     An EGD by Dr. Yara Hope on 12/11/2018 documented a normal esophagus, normal stomach and a degree of duodenal deformity. Gastric biopsy was urease negative.     Colonoscopy by Dr. Yara Hope on 1/9/2019 documented a polyp at 80 cm. Pathology identified a tubular adenoma, negative for high-grade dysplasia. Feraheme administered.     Labs on 3/13/2019:  · Iron 25   · Iron saturation 22%   · TIBC 116   · Ferritin >2000   · Reticulocyte count 0.0578   ·    · Haptoglobin 341   · Folate >20   · Vitamin B12 945   · Erythropoietin 13     He presented to [facility] on 4/26/2019 with melena and abdominal discomfort. He was subsequently admitted     EGD per Dr. Jenny Ricketts on 4/29/2019 revealed  · LA grade (1 or more mucosal breaks greater than 5 mm, not extending between the tops of the 2 mucosal folds)? esophagitis with no bleeding found in the distal esophagus   · Stomach was normal, biopsies for H. pylori taken.    · Cardia and gastric fundus were normal on retroflexion   · One nonbleeding cratered duodenal ulcer with no stigmata of bleeding was found in the duodenal bulb lesion was about 9 mm. · Many nonbleeding cratered, linear and superficial duodenal ulcers with no stigmata of bleeding were found in the second portion of the duodenum. The largest lesion was 6 mm in largest dimension. No mass encountered and anastomosis not seen.     He was admitted to [facility] on 5/8/2019 with melena, hematochezia, anemia, thrombocytopenia     Endoscopy performed by Dr. Janna Baxter on 5/9/2019 revealed  · Normal esophagus   · Gastric mucosal variant. 2 erythematous spots in the antrum, ? AVM   · Normal examined duodenum   · Nothing found to account for symptoms   He developed pancytopenia from chemotherapy and did require transfusions. His hemoglobin dropped to 7.3 on 6/25/2019 after his last treatment. He received 2 units packed red blood cells as an outpatient.     Serology 6/13/2019  Serum Fe - 117  TIBC - 587  Fe sat - 19.9%  Ferritin - 1,000  Iron levels have been adequately replaced. 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     TREATMENT SUMMARY  1. Feraheme 510 mg IV on 2/14 and 2/21/19   2.  Venofer 200 mg IV daily 5/8 - 5/10/2019 as IP at [facility]   3. s/p 2 units pRBC on 4/26/19 and 2 units pRBC on 4/29/2019 as IP at [facility]?  s/p 2 units pRBC on?5/8/2019   s/p 2 pheresis plts on 5/8/2019  s/p 2 units pRBC as OP 6/26/2019    Allergies:  Penicillins    Medicines:  Current Outpatient Medications   Medication Sig Dispense Refill    folic acid (FOLVITE) 1 MG tablet Take 1 tablet by mouth once daily 30 tablet 5    dexamethasone (DECADRON) 4 MG tablet TAKE 1 TABLET BY MOUTH TWICE DAILY DAY  BEFORE  CHEMO,  THE  DAY  OF  CHEMO,  AND  THE  DAY  AFTER  -  EVERY  21  DAYS 24 tablet 0    senna-docusate (SENOKOT S) 8.6-50 MG per tablet Take 2 tablets by mouth 2 times daily 120 tablet 1    spironolactone (ALDACTONE) 25 MG tablet Take 25 mg by mouth daily      flecainide (TAMBOCOR) 50 MG tablet Take 50 mg by mouth 2 times daily      sodium bicarbonate 650 MG tablet Take 650 mg by mouth 2 times daily      pantoprazole (PROTONIX) 40 MG tablet Take 40 mg by mouth daily      tamsulosin (FLOMAX) 0.4 MG capsule Take 0.4 mg by mouth daily      metoprolol tartrate (LOPRESSOR) 25 MG tablet Take 0.5 tablets by mouth 2 times daily 60 tablet 0    Multiple Vitamin (MULTI VITAMIN DAILY PO) Take by mouth daily        No current facility-administered medications for this visit. Past Medical History:      Diagnosis Date    Abnormal weight loss     Anemia     Anemia, unspecified     Blind left eye     Cardiomyopathy (HCC)     with compensated CHF    Cellulitis     Cellulitis of unspecified part of limb     Chronic kidney disease, stage IV (severe) (HCC)     Dr. Rashaad Damon COPD (chronic obstructive pulmonary disease) ([facility] [address]     Duodenal ulcer     Essential hypertension 10/2/2017    Eye injury     at age 12 from penetrating injury    Gout     HTN (hypertension)     Hydronephrosis     Hydronephrosis, left     Liver abscess     resolved    Lung nodule     Malignant neoplasm ([address]     Malignant neoplasm of upper lobe, right bronchus or lung (HCC)     MGUS (monoclonal gammopathy of unknown significance)     secondary to an IgG kappa.   IgG level in 2,000 range    Monoclonal gammopathy     NICM (nonischemic cardiomyopathy) (HCC)     Non-small cell lung cancer ([address] 6/4/2019    Non-sustained ventricular tachycardia (HCC)     NSVT (nonsustained ventricular tachycardia) (HCC)     Osteoarthritis     Other fatigue     Other iron deficiency anemias     Secondary malignant neoplasm of other digestive organs ([address]     Weight loss         Past Surgical History:      Procedure Laterality Date    BRONCHOSCOPY  11/27/2018    dx of adenocarcinoma RUL  Dr. Nicole Westfall CATH LAB PROCEDURE      ENDOSCOPY, COLON, DIAGNOSTIC  2019    aborted d/t vtach    EYE SURGERY Left     EYE SURGERY  1964    FOOT SURGERY      removal of joint from right toe from drilling accident, [address]  10/29/2003    with resection  Mitul-en-Y proecedure  DR. Mcfarlane    TUNNELED VENOUS PORT PLACEMENT      UPPER GASTROINTESTINAL ENDOSCOPY N/A 3/13/2019    EGD W/EUS FNA performed by Yolanda Love MD at [facility]        Family History:      Problem Relation Age of Onset    Osteoporosis Mother     High Blood Pressure Mother     Asthma Mother     Bleeding Prob Father     Cancer Father         leukemia    Asthma Brother         Social History  Social History     Tobacco Use    Smoking status: Former Smoker     Packs/day: 2.00     Years: 40.00     Pack years: 80.00     Types: Cigarettes     Last attempt to quit: 10/29/2003     Years since quittin.7    Smokeless tobacco: Never Used   Substance Use Topics    Alcohol use: No    Drug use: No          Review of Systems:  Constitutional: Negative for chills, fatigue, fever or significant weight loss. HENT: Negative for congestion, hearing loss, nosebleeds or sore throat. Eyes: Negative for photophobia, pain, discharge, redness and visual disturbance. Respiratory: Negative for cough, shortness of breath, or wheezing. Cardiovascular: Negative for chest pain, palpitations or leg swelling. Gastrointestinal: Negative for abdominal pain, blood in stool, constipation, diarrhea, nausea or vomiting. Genitourinary: Negative for dysuria, flank pain, frequency, hematuria or urgency. Musculoskeletal: Negative for back pain, joint swelling, myalgias or neck pain. Skin: Negative for rash or petechiae. Neurological: Negative for tremors, seizures, syncope, weakness or headaches. Hematological: No active bruising or bleeding. Psychiatric/Behavioral: Negative for hallucinations.            Objective:  Vital Signs: Blood pressure 122/74, pulse 85, temperature 98.5 °F 72 hours. Hepatic: No results for input(s): ALKPHOS, ALT, AST, PROT, BILITOT, BILIDIR, LABALBU in the last 72 hours. Lactic Acid: No results for input(s): LACTA in the last 72 hours. Troponin: No results for input(s): CKTOTAL, CKMB, TROPONINT in the last 72 hours. ABGs: No results for input(s): PH, PCO2, PO2, HCO3, O2SAT in the last 72 hours. CRP:  No results for input(s): CRP in the last 72 hours. Sed Rate:  No results for input(s): SEDRATE in the last 72 hours. Cultures:   No results for input(s): CULTURE in the last 72 hours. Radiology reports as per the Radiologist  Radiology: No results found. ASSESSMENT AND PLAN:  #1.    Jennifer Baker is a 68 y.o.  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 · CKD and chemotherapy associated anemia, on Procrit  · BPH on Flomax  · Orthostatic hypotension medications managed by Dr. Jonathan Sullivan    He completed 4 cycles of combination chemotherapy with carboplatin, Keytruda, Alimta.    Sophia is now  receiving  maintenance therapy with Keytruda and Alimta every 3 weeks. He has CKD associated anemia responding to Procrit.     Prabhjot has benign prostatic hypertrophy (BPH)  that is stable on Flomax. Orthostatic hypotension resolved with adjustment of metoprolol by Dr. Jonathan Sullivan   He takes Tambocor, metoprolol without new cardiac issues. Protonix continues without any new exacerbations of GERD. Lower extremity edema overall has actually improved some.     Prabhjot returns today for cycle #21 of Keytruda/Alimta. Physical examination does not reveal evidence of palpable lymphadenopathy in the supraclavicular infraclavicular or axillary areas. Lungs are relatively clear heart is regular abdomen is soft and benign. CBC today (7/9/2020) reveals a WBC of 7.89.  Hgb is 10 with an MCV of 112.6 and platelet count of 263,000. Serology on 6/18/2020 revealed:  CMP with glucose 171, BUN 28, creatinine 2.1  TSH- 1.010    CXR on 6/5/25020 documented severe chronic lung changes. Cycle #21 of Naty Coronado will be delivered today. Repeat scanning will be done prior to his return in 3 weeks to evaluate response. Repeat serology will also be performed today.     #2   Anemia of chronic renal failure and chemotherapy associated anemia     Hemoglobin is  10.8   Procrit 20,000 units weekly given as indicated to decrease transfusion requirements.         [facility] LPN am scribing for Bonnie Pelayo MD. Electronically signed by Wilfredo Shannon LPN on 4/83/7048 at 98:62 PM       Charan Irizarry was seen today for lung cancer. Diagnoses and all orders for this visit:    Non-small cell lung cancer, unspecified laterality ([facility] [address]  -     Comprehensive Metabolic Panel; Future  -     TSH without Reflex; Future  -     CT ABDOMEN PELVIS WO CONTRAST Additional Contrast? Radiologist Recommendation; Future  -     CT Chest WO Contrast; Future    Fatigue due to treatment  -     Comprehensive Metabolic Panel; Future  -     TSH without Reflex;  Future        Orders Placed This Encounter   Procedures    CT ABDOMEN PELVIS WO CONTRAST Additional Contrast? Radiologist Recommendation     Standing Status:   Future     Standing Expiration Date:   8/9/2020     Order Specific Question:   Additional Contrast?     Answer:   Radiologist Recommendation     Order Specific Question:   Reason for exam:     Answer:   metastasis screening    CT Chest WO Contrast     Standing Status:   Future     Standing Expiration Date:   9/9/2020     Order Specific Question:   Reason for exam:     Answer:   metastasis screening    Comprehensive Metabolic Panel     Standing Status:   Future     Number of Occurrences:   1     Standing Expiration Date:   7/9/2021    TSH without Reflex     Standing Status:   Future     Number of Occurrences:   1     Standing Expiration Date: 7/9/2021       No orders of the defined types were placed in this encounter. Return in 3 weeks (on 7/30/2020) for f/u with Patrick Crawley in tx room. I, Dr. Katarina Menon, personally performed the services described in this documentation as scribed by [facility] LPN in my presence, and it is both accurate and complete.

## 2020-07-16 ENCOUNTER — HOSPITAL ENCOUNTER (OUTPATIENT)
Dept: INFUSION THERAPY | Age: 80
Discharge: HOME | End: 2020-07-16
Payer: MEDICARE

## 2020-07-16 ENCOUNTER — HOSPITAL ENCOUNTER (OUTPATIENT)
Dept: CT IMAGING | Facility: HOSPITAL | Age: 80
Discharge: HOME | End: 2020-07-16
Admitting: INTERNAL MEDICINE

## 2020-07-16 VITALS
SYSTOLIC BLOOD PRESSURE: 92 MMHG | WEIGHT: 154.3 LBS | DIASTOLIC BLOOD PRESSURE: 64 MMHG | TEMPERATURE: 98.4 F | BODY MASS INDEX: 26.34 KG/M2 | OXYGEN SATURATION: 93 % | HEART RATE: 89 BPM | HEIGHT: 64 IN

## 2020-07-16 DIAGNOSIS — D63.1 ANEMIA, CHRONIC RENAL FAILURE, STAGE 3 (MODERATE) (HCC): ICD-10-CM

## 2020-07-16 DIAGNOSIS — N18.30 ANEMIA, CHRONIC RENAL FAILURE, STAGE 3 (MODERATE) (HCC): ICD-10-CM

## 2020-07-16 DIAGNOSIS — C34.90 NON-SMALL CELL LUNG CANCER, UNSPECIFIED LATERALITY (HCC): Primary | ICD-10-CM

## 2020-07-16 DIAGNOSIS — C80.1 MALIGNANT NEOPLASM (HCC): ICD-10-CM

## 2020-07-16 DIAGNOSIS — N18.4 CHRONIC KIDNEY DISEASE, STAGE IV (SEVERE) (HCC): ICD-10-CM

## 2020-07-16 LAB
BASOPHILS ABSOLUTE: 0.01 K/UL (ref 0.01–0.08)
BASOPHILS RELATIVE PERCENT: 0.3 % (ref 0.1–1.2)
EOSINOPHILS ABSOLUTE: 0.06 K/UL (ref 0.04–0.54)
EOSINOPHILS RELATIVE PERCENT: 2 % (ref 0.7–7)
HCT VFR BLD CALC: 30.9 % (ref 40.1–51)
HEMOGLOBIN: 9.5 G/DL (ref 13.7–17.5)
LYMPHOCYTES ABSOLUTE: 0.84 K/UL (ref 1.18–3.74)
LYMPHOCYTES RELATIVE PERCENT: 28 % (ref 19.3–53.1)
MCH RBC QN AUTO: 35.4 PG (ref 25.7–32.2)
MCHC RBC AUTO-ENTMCNC: 30.7 G/DL (ref 32.3–36.5)
MCV RBC AUTO: 115.3 FL (ref 79–92.2)
MONOCYTES ABSOLUTE: 0.2 K/UL (ref 0.24–0.82)
MONOCYTES RELATIVE PERCENT: 6.7 % (ref 4.7–12.5)
NEUTROPHILS ABSOLUTE: 1.89 K/UL (ref 1.56–6.13)
NEUTROPHILS RELATIVE PERCENT: 63 % (ref 34–71.1)
PDW BLD-RTO: 19.9 % (ref 11.6–14.4)
PLATELET # BLD: 154 K/UL (ref 163–337)
PMV BLD AUTO: 10.3 FL (ref 7.4–10.4)
RBC # BLD: 2.68 M/UL (ref 4.63–6.08)
WBC # BLD: 3 K/UL (ref 4.23–9.07)

## 2020-07-16 PROCEDURE — 74176 CT ABD & PELVIS W/O CONTRAST: CPT

## 2020-07-16 PROCEDURE — 6360000002 HC RX W HCPCS: Performed by: PHYSICIAN ASSISTANT

## 2020-07-16 PROCEDURE — 85025 COMPLETE CBC W/AUTO DIFF WBC: CPT

## 2020-07-16 PROCEDURE — 96372 THER/PROPH/DIAG INJ SC/IM: CPT

## 2020-07-16 RX ADMIN — EPOETIN ALFA-EPBX 20000 UNITS: 10000 INJECTION, SOLUTION INTRAVENOUS; SUBCUTANEOUS at 12:46

## 2020-07-23 ENCOUNTER — APPOINTMENT (OUTPATIENT)
Dept: GENERAL RADIOLOGY | Facility: HOSPITAL | Age: 80
End: 2020-07-23

## 2020-07-23 ENCOUNTER — HOSPITAL ENCOUNTER (OUTPATIENT)
Dept: INFUSION THERAPY | Age: 80
Discharge: HOME | End: 2020-07-23
Payer: MEDICARE

## 2020-07-23 ENCOUNTER — HOSPITAL ENCOUNTER (INPATIENT)
Facility: HOSPITAL | Age: 80
LOS: 3 days | Discharge: HOME | End: 2020-07-26
Attending: FAMILY MEDICINE | Admitting: FAMILY MEDICINE

## 2020-07-23 ENCOUNTER — HOSPITAL ENCOUNTER (INPATIENT)
Dept: NUCLEAR MEDICINE | Facility: HOSPITAL | Age: 80
Discharge: HOME | End: 2020-07-23

## 2020-07-23 VITALS
HEIGHT: 64 IN | WEIGHT: 152.8 LBS | OXYGEN SATURATION: 86 % | SYSTOLIC BLOOD PRESSURE: 138 MMHG | BODY MASS INDEX: 26.09 KG/M2 | HEART RATE: 103 BPM | DIASTOLIC BLOOD PRESSURE: 68 MMHG | TEMPERATURE: 98.5 F

## 2020-07-23 DIAGNOSIS — C80.1 MALIGNANT NEOPLASM (HCC): ICD-10-CM

## 2020-07-23 DIAGNOSIS — D61.810 PANCYTOPENIA DUE TO CHEMOTHERAPY (HCC): ICD-10-CM

## 2020-07-23 DIAGNOSIS — N18.4 CHRONIC KIDNEY DISEASE, STAGE IV (SEVERE) (HCC): ICD-10-CM

## 2020-07-23 DIAGNOSIS — J18.9 PNEUMONIA DUE TO INFECTIOUS ORGANISM, UNSPECIFIED LATERALITY, UNSPECIFIED PART OF LUNG: Primary | ICD-10-CM

## 2020-07-23 DIAGNOSIS — C34.90 ADENOCARCINOMA OF LUNG, UNSPECIFIED LATERALITY (HCC): ICD-10-CM

## 2020-07-23 DIAGNOSIS — D63.1 ANEMIA, CHRONIC RENAL FAILURE, STAGE 3 (MODERATE) (HCC): ICD-10-CM

## 2020-07-23 DIAGNOSIS — Z78.9 DECREASED ACTIVITIES OF DAILY LIVING (ADL): ICD-10-CM

## 2020-07-23 DIAGNOSIS — N18.30 ANEMIA, CHRONIC RENAL FAILURE, STAGE 3 (MODERATE) (HCC): ICD-10-CM

## 2020-07-23 DIAGNOSIS — C34.90 NON-SMALL CELL LUNG CANCER, UNSPECIFIED LATERALITY (HCC): Primary | ICD-10-CM

## 2020-07-23 DIAGNOSIS — N18.9 CHRONIC KIDNEY DISEASE, UNSPECIFIED CKD STAGE: ICD-10-CM

## 2020-07-23 DIAGNOSIS — R09.02 HYPOXIA: ICD-10-CM

## 2020-07-23 PROBLEM — J96.01 ACUTE RESPIRATORY FAILURE WITH HYPOXIA (HCC): Status: ACTIVE | Noted: 2020-07-23

## 2020-07-23 PROBLEM — A41.9 SEPSIS (HCC): Status: ACTIVE | Noted: 2020-07-23

## 2020-07-23 PROBLEM — J84.10 FIBROTIC LUNG DISEASES (HCC): Status: ACTIVE | Noted: 2020-07-23

## 2020-07-23 PROBLEM — L03.90 CELLULITIS: Status: ACTIVE | Noted: 2020-07-23

## 2020-07-23 PROBLEM — D69.6 THROMBOCYTOPENIA (HCC): Status: ACTIVE | Noted: 2020-07-23

## 2020-07-23 PROBLEM — D53.9 MACROCYTIC ANEMIA: Status: ACTIVE | Noted: 2020-07-23

## 2020-07-23 PROBLEM — N28.9 FUNCTION KIDNEY DECREASED: Status: ACTIVE | Noted: 2020-07-23

## 2020-07-23 LAB
ALBUMIN SERPL-MCNC: 3.6 G/DL (ref 3.5–5.2)
ALBUMIN/GLOB SERPL: 0.9 G/DL
ALP SERPL-CCNC: 90 U/L (ref 39–117)
ALT SERPL W P-5'-P-CCNC: 33 U/L (ref 1–41)
ANION GAP SERPL CALCULATED.3IONS-SCNC: 14 MMOL/L (ref 5–15)
ANISOCYTOSIS BLD QL: ABNORMAL
ARTERIAL PATENCY WRIST A: ABNORMAL
AST SERPL-CCNC: 46 U/L (ref 1–40)
ATMOSPHERIC PRESS: 753 MMHG
B PARAPERT DNA SPEC QL NAA+PROBE: NOT DETECTED
B PARAPERT DNA SPEC QL NAA+PROBE: NOT DETECTED
B PERT DNA SPEC QL NAA+PROBE: NOT DETECTED
B PERT DNA SPEC QL NAA+PROBE: NOT DETECTED
BASE EXCESS BLDA CALC-SCNC: -2.3 MMOL/L (ref 0–2)
BASOPHILS ABSOLUTE: 0.03 K/UL (ref 0.01–0.08)
BASOPHILS RELATIVE PERCENT: 0.6 % (ref 0.1–1.2)
BDY SITE: ABNORMAL
BILIRUB SERPL-MCNC: 0.8 MG/DL (ref 0–1.2)
BODY TEMPERATURE: 37 C
BUN SERPL-MCNC: 30 MG/DL (ref 8–23)
BUN/CREAT SERPL: 12.8 (ref 7–25)
C PNEUM DNA NPH QL NAA+NON-PROBE: NOT DETECTED
C PNEUM DNA NPH QL NAA+NON-PROBE: NOT DETECTED
CALCIUM SPEC-SCNC: 10.3 MG/DL (ref 8.6–10.5)
CHLORIDE SERPL-SCNC: 97 MMOL/L (ref 98–107)
CO2 SERPL-SCNC: 22 MMOL/L (ref 22–29)
CREAT SERPL-MCNC: 2.35 MG/DL (ref 0.76–1.27)
CRP SERPL-MCNC: 6.28 MG/DL (ref 0–0.5)
D DIMER PPP FEU-MCNC: 2.56 MG/L (FEU) (ref 0–0.5)
D-LACTATE SERPL-SCNC: 1.2 MMOL/L (ref 0.5–2)
D-LACTATE SERPL-SCNC: 4 MMOL/L (ref 0.5–2)
DEPRECATED RDW RBC AUTO: 76.1 FL (ref 37–54)
EOSINOPHIL # BLD MANUAL: 0.04 10*3/MM3 (ref 0–0.4)
EOSINOPHIL NFR BLD MANUAL: 1 % (ref 0.3–6.2)
EOSINOPHILS ABSOLUTE: 0.02 K/UL (ref 0.04–0.54)
EOSINOPHILS RELATIVE PERCENT: 0.4 % (ref 0.7–7)
ERYTHROCYTE [DISTWIDTH] IN BLOOD BY AUTOMATED COUNT: 20 % (ref 12.3–15.4)
FERRITIN SERPL-MCNC: 4257 NG/ML (ref 30–400)
FLUAV H1 2009 PAND RNA NPH QL NAA+PROBE: NOT DETECTED
FLUAV H1 2009 PAND RNA NPH QL NAA+PROBE: NOT DETECTED
FLUAV H1 HA GENE NPH QL NAA+PROBE: NOT DETECTED
FLUAV H1 HA GENE NPH QL NAA+PROBE: NOT DETECTED
FLUAV H3 RNA NPH QL NAA+PROBE: NOT DETECTED
FLUAV H3 RNA NPH QL NAA+PROBE: NOT DETECTED
FLUAV SUBTYP SPEC NAA+PROBE: NOT DETECTED
FLUAV SUBTYP SPEC NAA+PROBE: NOT DETECTED
FLUBV RNA ISLT QL NAA+PROBE: NOT DETECTED
FLUBV RNA ISLT QL NAA+PROBE: NOT DETECTED
GAS FLOW AIRWAY: 2 LPM
GFR SERPL CREATININE-BSD FRML MDRD: 27 ML/MIN/1.73
GLOBULIN UR ELPH-MCNC: 4.1 GM/DL
GLUCOSE SERPL-MCNC: 144 MG/DL (ref 65–99)
HADV DNA SPEC NAA+PROBE: NOT DETECTED
HADV DNA SPEC NAA+PROBE: NOT DETECTED
HCO3 BLDA-SCNC: 20.5 MMOL/L (ref 20–26)
HCOV 229E RNA SPEC QL NAA+PROBE: NOT DETECTED
HCOV 229E RNA SPEC QL NAA+PROBE: NOT DETECTED
HCOV HKU1 RNA SPEC QL NAA+PROBE: NOT DETECTED
HCOV HKU1 RNA SPEC QL NAA+PROBE: NOT DETECTED
HCOV NL63 RNA SPEC QL NAA+PROBE: NOT DETECTED
HCOV NL63 RNA SPEC QL NAA+PROBE: NOT DETECTED
HCOV OC43 RNA SPEC QL NAA+PROBE: NOT DETECTED
HCOV OC43 RNA SPEC QL NAA+PROBE: NOT DETECTED
HCT VFR BLD AUTO: 28.8 % (ref 37.5–51)
HCT VFR BLD CALC: 32.1 % (ref 40.1–51)
HEMOGLOBIN: 9.7 G/DL (ref 13.7–17.5)
HGB BLD-MCNC: 9 G/DL (ref 13–17.7)
HMPV RNA NPH QL NAA+NON-PROBE: NOT DETECTED
HMPV RNA NPH QL NAA+NON-PROBE: NOT DETECTED
HOLD SPECIMEN: NORMAL
HOLD SPECIMEN: NORMAL
HPIV1 RNA SPEC QL NAA+PROBE: NOT DETECTED
HPIV1 RNA SPEC QL NAA+PROBE: NOT DETECTED
HPIV2 RNA SPEC QL NAA+PROBE: NOT DETECTED
HPIV2 RNA SPEC QL NAA+PROBE: NOT DETECTED
HPIV3 RNA NPH QL NAA+PROBE: NOT DETECTED
HPIV3 RNA NPH QL NAA+PROBE: NOT DETECTED
HPIV4 P GENE NPH QL NAA+PROBE: NOT DETECTED
HPIV4 P GENE NPH QL NAA+PROBE: NOT DETECTED
INR PPP: 1.13 (ref 0.91–1.09)
LACTATE HOLD SPECIMEN: NORMAL
LDH SERPL-CCNC: 261 U/L (ref 135–225)
LYMPHOCYTES # BLD MANUAL: 0.04 10*3/MM3 (ref 0.7–3.1)
LYMPHOCYTES ABSOLUTE: 0.6 K/UL (ref 1.18–3.74)
LYMPHOCYTES NFR BLD MANUAL: 1 % (ref 19.6–45.3)
LYMPHOCYTES NFR BLD MANUAL: 7 % (ref 5–12)
LYMPHOCYTES RELATIVE PERCENT: 12.7 % (ref 19.3–53.1)
Lab: ABNORMAL
M PNEUMO IGG SER IA-ACNC: NOT DETECTED
M PNEUMO IGG SER IA-ACNC: NOT DETECTED
MACROCYTES BLD QL SMEAR: ABNORMAL
MCH RBC QN AUTO: 33.5 PG (ref 26.6–33)
MCH RBC QN AUTO: 35.7 PG (ref 25.7–32.2)
MCHC RBC AUTO-ENTMCNC: 30.2 G/DL (ref 32.3–36.5)
MCHC RBC AUTO-ENTMCNC: 31.3 G/DL (ref 31.5–35.7)
MCV RBC AUTO: 107.1 FL (ref 79–97)
MCV RBC AUTO: 118 FL (ref 79–92.2)
MODALITY: ABNORMAL
MONOCYTES # BLD AUTO: 0.29 10*3/MM3 (ref 0.1–0.9)
MONOCYTES ABSOLUTE: 0.37 K/UL (ref 0.24–0.82)
MONOCYTES RELATIVE PERCENT: 7.8 % (ref 4.7–12.5)
MRSA DNA SPEC QL NAA+PROBE: NORMAL
NEUTROPHILS # BLD AUTO: 3.77 10*3/MM3 (ref 1.7–7)
NEUTROPHILS ABSOLUTE: 3.72 K/UL (ref 1.56–6.13)
NEUTROPHILS NFR BLD MANUAL: 89 % (ref 42.7–76)
NEUTROPHILS RELATIVE PERCENT: 78.5 % (ref 34–71.1)
NEUTS BAND NFR BLD MANUAL: 2 % (ref 0–5)
NT-PROBNP SERPL-MCNC: 668.9 PG/ML (ref 0–1800)
PCO2 BLDA: 27.8 MM HG (ref 35–45)
PCO2 TEMP ADJ BLD: 27.8 MM HG (ref 35–45)
PDW BLD-RTO: 20 % (ref 11.6–14.4)
PH BLDA: 7.48 PH UNITS (ref 7.35–7.45)
PH, TEMP CORRECTED: 7.48 PH UNITS (ref 7.35–7.45)
PLATELET # BLD AUTO: 47 10*3/MM3 (ref 140–450)
PLATELET # BLD: 43 K/UL (ref 163–337)
PMV BLD AUTO: 11 FL (ref 7.4–10.4)
PMV BLD AUTO: 11.3 FL (ref 6–12)
PO2 BLDA: 57.5 MM HG (ref 83–108)
PO2 TEMP ADJ BLD: 57.5 MM HG (ref 83–108)
POLYCHROMASIA BLD QL SMEAR: ABNORMAL
POTASSIUM SERPL-SCNC: 4.7 MMOL/L (ref 3.5–5.2)
PROCALCITONIN SERPL-MCNC: 0.36 NG/ML (ref 0–0.25)
PROT SERPL-MCNC: 7.7 G/DL (ref 6–8.5)
PROTHROMBIN TIME: 14.1 SECONDS (ref 11.9–14.6)
RBC # BLD AUTO: 2.69 10*6/MM3 (ref 4.14–5.8)
RBC # BLD: 2.72 M/UL (ref 4.63–6.08)
RHINOVIRUS RNA SPEC NAA+PROBE: NOT DETECTED
RHINOVIRUS RNA SPEC NAA+PROBE: NOT DETECTED
RSV RNA NPH QL NAA+NON-PROBE: NOT DETECTED
RSV RNA NPH QL NAA+NON-PROBE: NOT DETECTED
S PNEUM AG SPEC QL LA: NEGATIVE
SAO2 % BLDCOA: 90.2 % (ref 94–99)
SARS-COV-2 RNA RESP QL NAA+PROBE: NOT DETECTED
SMALL PLATELETS BLD QL SMEAR: ABNORMAL
SODIUM SERPL-SCNC: 133 MMOL/L (ref 136–145)
TROPONIN T SERPL-MCNC: 0.05 NG/ML (ref 0–0.03)
TROPONIN T SERPL-MCNC: 0.05 NG/ML (ref 0–0.03)
VENTILATOR MODE: ABNORMAL
WBC # BLD AUTO: 4.14 10*3/MM3 (ref 3.4–10.8)
WBC # BLD: 4.74 K/UL (ref 4.23–9.07)
WBC MORPH BLD: NORMAL
WHOLE BLOOD HOLD SPECIMEN: NORMAL
WHOLE BLOOD HOLD SPECIMEN: NORMAL

## 2020-07-23 PROCEDURE — 85025 COMPLETE CBC W/AUTO DIFF WBC: CPT

## 2020-07-23 PROCEDURE — 86140 C-REACTIVE PROTEIN: CPT | Performed by: NURSE PRACTITIONER

## 2020-07-23 PROCEDURE — 85610 PROTHROMBIN TIME: CPT | Performed by: NURSE PRACTITIONER

## 2020-07-23 PROCEDURE — 84145 PROCALCITONIN (PCT): CPT | Performed by: NURSE PRACTITIONER

## 2020-07-23 PROCEDURE — 71045 X-RAY EXAM CHEST 1 VIEW: CPT

## 2020-07-23 PROCEDURE — 93005 ELECTROCARDIOGRAM TRACING: CPT | Performed by: NURSE PRACTITIONER

## 2020-07-23 PROCEDURE — 85007 BL SMEAR W/DIFF WBC COUNT: CPT | Performed by: NURSE PRACTITIONER

## 2020-07-23 PROCEDURE — 83880 ASSAY OF NATRIURETIC PEPTIDE: CPT | Performed by: NURSE PRACTITIONER

## 2020-07-23 PROCEDURE — 84484 ASSAY OF TROPONIN QUANT: CPT | Performed by: NURSE PRACTITIONER

## 2020-07-23 PROCEDURE — 87070 CULTURE OTHR SPECIMN AEROBIC: CPT | Performed by: NURSE PRACTITIONER

## 2020-07-23 PROCEDURE — 0100U HC BIOFIRE FILMARRAY RESP PANEL 2: CPT | Performed by: NURSE PRACTITIONER

## 2020-07-23 PROCEDURE — 78582 LUNG VENTILAT&PERFUS IMAGING: CPT

## 2020-07-23 PROCEDURE — 87186 SC STD MICRODIL/AGAR DIL: CPT | Performed by: NURSE PRACTITIONER

## 2020-07-23 PROCEDURE — 25010000002 CEFTRIAXONE PER 250 MG: Performed by: NURSE PRACTITIONER

## 2020-07-23 PROCEDURE — 93010 ELECTROCARDIOGRAM REPORT: CPT | Performed by: INTERNAL MEDICINE

## 2020-07-23 PROCEDURE — 87635 SARS-COV-2 COVID-19 AMP PRB: CPT | Performed by: NURSE PRACTITIONER

## 2020-07-23 PROCEDURE — 25010000002 AZITHROMYCIN PER 500 MG: Performed by: NURSE PRACTITIONER

## 2020-07-23 PROCEDURE — 87205 SMEAR GRAM STAIN: CPT | Performed by: NURSE PRACTITIONER

## 2020-07-23 PROCEDURE — 0 TECHNETIUM ALBUMIN AGGREGATED: Performed by: NURSE PRACTITIONER

## 2020-07-23 PROCEDURE — 87899 AGENT NOS ASSAY W/OPTIC: CPT | Performed by: NURSE PRACTITIONER

## 2020-07-23 PROCEDURE — 80053 COMPREHEN METABOLIC PANEL: CPT | Performed by: NURSE PRACTITIONER

## 2020-07-23 PROCEDURE — 94799 UNLISTED PULMONARY SVC/PX: CPT

## 2020-07-23 PROCEDURE — 82803 BLOOD GASES ANY COMBINATION: CPT

## 2020-07-23 PROCEDURE — 99284 EMERGENCY DEPT VISIT MOD MDM: CPT

## 2020-07-23 PROCEDURE — 6360000002 HC RX W HCPCS: Performed by: PHYSICIAN ASSISTANT

## 2020-07-23 PROCEDURE — A9540 TC99M MAA: HCPCS | Performed by: NURSE PRACTITIONER

## 2020-07-23 PROCEDURE — 87150 DNA/RNA AMPLIFIED PROBE: CPT | Performed by: NURSE PRACTITIONER

## 2020-07-23 PROCEDURE — 82728 ASSAY OF FERRITIN: CPT | Performed by: NURSE PRACTITIONER

## 2020-07-23 PROCEDURE — 36415 COLL VENOUS BLD VENIPUNCTURE: CPT

## 2020-07-23 PROCEDURE — 83615 LACTATE (LD) (LDH) ENZYME: CPT | Performed by: NURSE PRACTITIONER

## 2020-07-23 PROCEDURE — 73080 X-RAY EXAM OF ELBOW: CPT

## 2020-07-23 PROCEDURE — 85379 FIBRIN DEGRADATION QUANT: CPT | Performed by: NURSE PRACTITIONER

## 2020-07-23 PROCEDURE — 87040 BLOOD CULTURE FOR BACTERIA: CPT | Performed by: NURSE PRACTITIONER

## 2020-07-23 PROCEDURE — 87641 MR-STAPH DNA AMP PROBE: CPT | Performed by: NURSE PRACTITIONER

## 2020-07-23 PROCEDURE — 83605 ASSAY OF LACTIC ACID: CPT | Performed by: NURSE PRACTITIONER

## 2020-07-23 PROCEDURE — 94640 AIRWAY INHALATION TREATMENT: CPT

## 2020-07-23 PROCEDURE — 36600 WITHDRAWAL OF ARTERIAL BLOOD: CPT

## 2020-07-23 PROCEDURE — 25010000002 METHYLPREDNISOLONE PER 125 MG: Performed by: NURSE PRACTITIONER

## 2020-07-23 PROCEDURE — 96372 THER/PROPH/DIAG INJ SC/IM: CPT

## 2020-07-23 PROCEDURE — 85025 COMPLETE CBC W/AUTO DIFF WBC: CPT | Performed by: NURSE PRACTITIONER

## 2020-07-23 RX ORDER — AMOXICILLIN 250 MG
1 CAPSULE ORAL DAILY
Status: ON HOLD | COMMUNITY
End: 2020-07-26 | Stop reason: SDUPTHER

## 2020-07-23 RX ORDER — MONTELUKAST SODIUM 10 MG/1
10 TABLET ORAL NIGHTLY
COMMUNITY
End: 2021-08-25

## 2020-07-23 RX ORDER — IPRATROPIUM BROMIDE AND ALBUTEROL SULFATE 2.5; .5 MG/3ML; MG/3ML
3 SOLUTION RESPIRATORY (INHALATION) ONCE
Status: COMPLETED | OUTPATIENT
Start: 2020-07-23 | End: 2020-07-23

## 2020-07-23 RX ORDER — PANTOPRAZOLE SODIUM 40 MG/1
40 TABLET, DELAYED RELEASE ORAL DAILY
Status: DISCONTINUED | OUTPATIENT
Start: 2020-07-23 | End: 2020-07-26 | Stop reason: HOSPADM

## 2020-07-23 RX ORDER — LANOLIN ALCOHOL/MO/W.PET/CERES
6 CREAM (GRAM) TOPICAL NIGHTLY PRN
Status: DISCONTINUED | OUTPATIENT
Start: 2020-07-23 | End: 2020-07-26 | Stop reason: HOSPADM

## 2020-07-23 RX ORDER — ONDANSETRON 2 MG/ML
4 INJECTION INTRAMUSCULAR; INTRAVENOUS EVERY 6 HOURS PRN
Status: DISCONTINUED | OUTPATIENT
Start: 2020-07-23 | End: 2020-07-26 | Stop reason: HOSPADM

## 2020-07-23 RX ORDER — ONDANSETRON 4 MG/1
4 TABLET, FILM COATED ORAL EVERY 6 HOURS PRN
Status: DISCONTINUED | OUTPATIENT
Start: 2020-07-23 | End: 2020-07-26 | Stop reason: HOSPADM

## 2020-07-23 RX ORDER — FLECAINIDE ACETATE 50 MG/1
50 TABLET ORAL
Status: DISCONTINUED | OUTPATIENT
Start: 2020-07-24 | End: 2020-07-26 | Stop reason: HOSPADM

## 2020-07-23 RX ORDER — FOLIC ACID 1 MG/1
1 TABLET ORAL DAILY
Status: DISCONTINUED | OUTPATIENT
Start: 2020-07-23 | End: 2020-07-26 | Stop reason: HOSPADM

## 2020-07-23 RX ORDER — SPIRONOLACTONE 25 MG/1
25 TABLET ORAL DAILY
COMMUNITY
End: 2020-07-26 | Stop reason: HOSPADM

## 2020-07-23 RX ORDER — SODIUM CHLORIDE 9 MG/ML
50 INJECTION, SOLUTION INTRAVENOUS CONTINUOUS
Status: DISCONTINUED | OUTPATIENT
Start: 2020-07-23 | End: 2020-07-26 | Stop reason: HOSPADM

## 2020-07-23 RX ORDER — SODIUM CHLORIDE 0.9 % (FLUSH) 0.9 %
10 SYRINGE (ML) INJECTION AS NEEDED
Status: DISCONTINUED | OUTPATIENT
Start: 2020-07-23 | End: 2020-07-26 | Stop reason: HOSPADM

## 2020-07-23 RX ORDER — TAMSULOSIN HYDROCHLORIDE 0.4 MG/1
0.4 CAPSULE ORAL NIGHTLY
Status: DISCONTINUED | OUTPATIENT
Start: 2020-07-23 | End: 2020-07-26 | Stop reason: HOSPADM

## 2020-07-23 RX ORDER — IPRATROPIUM BROMIDE AND ALBUTEROL SULFATE 2.5; .5 MG/3ML; MG/3ML
3 SOLUTION RESPIRATORY (INHALATION) EVERY 4 HOURS PRN
Status: DISCONTINUED | OUTPATIENT
Start: 2020-07-23 | End: 2020-07-25

## 2020-07-23 RX ORDER — SODIUM CHLORIDE 0.9 % (FLUSH) 0.9 %
10 SYRINGE (ML) INJECTION EVERY 12 HOURS SCHEDULED
Status: DISCONTINUED | OUTPATIENT
Start: 2020-07-23 | End: 2020-07-26 | Stop reason: HOSPADM

## 2020-07-23 RX ORDER — CETIRIZINE HYDROCHLORIDE 10 MG/1
10 TABLET ORAL DAILY
COMMUNITY
End: 2020-07-26 | Stop reason: HOSPADM

## 2020-07-23 RX ORDER — SODIUM BICARBONATE 650 MG/1
650 TABLET ORAL 2 TIMES DAILY
Status: DISCONTINUED | OUTPATIENT
Start: 2020-07-23 | End: 2020-07-26 | Stop reason: HOSPADM

## 2020-07-23 RX ORDER — METHYLPREDNISOLONE SODIUM SUCCINATE 125 MG/2ML
125 INJECTION, POWDER, LYOPHILIZED, FOR SOLUTION INTRAMUSCULAR; INTRAVENOUS ONCE
Status: COMPLETED | OUTPATIENT
Start: 2020-07-23 | End: 2020-07-23

## 2020-07-23 RX ADMIN — Medication 6 MG: at 22:22

## 2020-07-23 RX ADMIN — SODIUM CHLORIDE 100 ML/HR: 9 INJECTION, SOLUTION INTRAVENOUS at 15:47

## 2020-07-23 RX ADMIN — SODIUM CHLORIDE 1000 ML: 9 INJECTION, SOLUTION INTRAVENOUS at 13:32

## 2020-07-23 RX ADMIN — IPRATROPIUM BROMIDE AND ALBUTEROL SULFATE 3 ML: 2.5; .5 SOLUTION RESPIRATORY (INHALATION) at 13:56

## 2020-07-23 RX ADMIN — DOXYCYCLINE 100 MG: 100 INJECTION, POWDER, LYOPHILIZED, FOR SOLUTION INTRAVENOUS at 18:29

## 2020-07-23 RX ADMIN — PANTOPRAZOLE SODIUM 40 MG: 40 TABLET, DELAYED RELEASE ORAL at 18:29

## 2020-07-23 RX ADMIN — TAMSULOSIN HYDROCHLORIDE 0.4 MG: 0.4 CAPSULE ORAL at 21:17

## 2020-07-23 RX ADMIN — Medication 1 DOSE: at 15:24

## 2020-07-23 RX ADMIN — AZITHROMYCIN 500 MG: 500 INJECTION, POWDER, LYOPHILIZED, FOR SOLUTION INTRAVENOUS at 14:34

## 2020-07-23 RX ADMIN — EPOETIN ALFA-EPBX 20000 UNITS: 10000 INJECTION, SOLUTION INTRAVENOUS; SUBCUTANEOUS at 11:05

## 2020-07-23 RX ADMIN — FOLIC ACID 1 MG: 1 TABLET ORAL at 18:29

## 2020-07-23 RX ADMIN — METHYLPREDNISOLONE SODIUM SUCCINATE 125 MG: 125 INJECTION, POWDER, FOR SOLUTION INTRAMUSCULAR; INTRAVENOUS at 13:47

## 2020-07-23 RX ADMIN — CEFTRIAXONE SODIUM 1 G: 1 INJECTION, POWDER, FOR SOLUTION INTRAMUSCULAR; INTRAVENOUS at 13:43

## 2020-07-23 RX ADMIN — SODIUM BICARBONATE 650 MG: 650 TABLET ORAL at 21:17

## 2020-07-23 NOTE — PROGRESS NOTES
Patient presents for cbc/Procrit as indicated. Presents with rigors, elevated temp 99.7. Has red. Swollen left elbow (states had previous fall x 1 week ago), red and warm to touch. VS= /88 P123, Spo2 84%, R22. Per Dr Salvador Dubin, patient to be evaluated in ER with fever, rigors.

## 2020-07-24 ENCOUNTER — APPOINTMENT (OUTPATIENT)
Dept: CARDIOLOGY | Facility: HOSPITAL | Age: 80
End: 2020-07-24

## 2020-07-24 LAB
ALBUMIN SERPL-MCNC: 2.9 G/DL (ref 3.5–5.2)
ALBUMIN/GLOB SERPL: 0.9 G/DL
ALP SERPL-CCNC: 70 U/L (ref 39–117)
ALT SERPL W P-5'-P-CCNC: 24 U/L (ref 1–41)
ANION GAP SERPL CALCULATED.3IONS-SCNC: 10 MMOL/L (ref 5–15)
AST SERPL-CCNC: 32 U/L (ref 1–40)
BACTERIA BLD CULT: ABNORMAL
BASOPHILS # BLD AUTO: 0 10*3/MM3 (ref 0–0.2)
BASOPHILS NFR BLD AUTO: 0 % (ref 0–1.5)
BILIRUB SERPL-MCNC: 0.3 MG/DL (ref 0–1.2)
BUN SERPL-MCNC: 28 MG/DL (ref 8–23)
BUN/CREAT SERPL: 13 (ref 7–25)
CALCIUM SPEC-SCNC: 9.6 MG/DL (ref 8.6–10.5)
CHLORIDE SERPL-SCNC: 102 MMOL/L (ref 98–107)
CHOLEST SERPL-MCNC: 79 MG/DL (ref 0–200)
CO2 SERPL-SCNC: 22 MMOL/L (ref 22–29)
CREAT SERPL-MCNC: 2.16 MG/DL (ref 0.76–1.27)
DEPRECATED RDW RBC AUTO: 74.5 FL (ref 37–54)
EOSINOPHIL # BLD AUTO: 0 10*3/MM3 (ref 0–0.4)
EOSINOPHIL NFR BLD AUTO: 0 % (ref 0.3–6.2)
ERYTHROCYTE [DISTWIDTH] IN BLOOD BY AUTOMATED COUNT: 20.1 % (ref 12.3–15.4)
GFR SERPL CREATININE-BSD FRML MDRD: 30 ML/MIN/1.73
GLOBULIN UR ELPH-MCNC: 3.3 GM/DL
GLUCOSE SERPL-MCNC: 172 MG/DL (ref 65–99)
HBA1C MFR BLD: 5.6 % (ref 4.8–5.6)
HCT VFR BLD AUTO: 22.8 % (ref 37.5–51)
HDLC SERPL-MCNC: 28 MG/DL (ref 40–60)
HGB BLD-MCNC: 7.4 G/DL (ref 13–17.7)
IMM GRANULOCYTES # BLD AUTO: 0.02 10*3/MM3 (ref 0–0.05)
IMM GRANULOCYTES NFR BLD AUTO: 0.4 % (ref 0–0.5)
LDLC SERPL CALC-MCNC: 39 MG/DL (ref 0–100)
LDLC/HDLC SERPL: 1.39 {RATIO}
LYMPHOCYTES # BLD AUTO: 0.3 10*3/MM3 (ref 0.7–3.1)
LYMPHOCYTES NFR BLD AUTO: 6.5 % (ref 19.6–45.3)
MCH RBC QN AUTO: 34.4 PG (ref 26.6–33)
MCHC RBC AUTO-ENTMCNC: 32.5 G/DL (ref 31.5–35.7)
MCV RBC AUTO: 106 FL (ref 79–97)
MONOCYTES # BLD AUTO: 0.23 10*3/MM3 (ref 0.1–0.9)
MONOCYTES NFR BLD AUTO: 5 % (ref 5–12)
NEUTROPHILS NFR BLD AUTO: 4.05 10*3/MM3 (ref 1.7–7)
NEUTROPHILS NFR BLD AUTO: 88.1 % (ref 42.7–76)
NRBC BLD AUTO-RTO: 0 /100 WBC (ref 0–0.2)
PLATELET # BLD AUTO: 47 10*3/MM3 (ref 140–450)
PMV BLD AUTO: 11.4 FL (ref 6–12)
POTASSIUM SERPL-SCNC: 5.5 MMOL/L (ref 3.5–5.2)
PROT SERPL-MCNC: 6.2 G/DL (ref 6–8.5)
RBC # BLD AUTO: 2.15 10*6/MM3 (ref 4.14–5.8)
SODIUM SERPL-SCNC: 134 MMOL/L (ref 136–145)
TRIGL SERPL-MCNC: 60 MG/DL (ref 0–150)
TROPONIN T SERPL-MCNC: 0.03 NG/ML (ref 0–0.03)
VLDLC SERPL-MCNC: 12 MG/DL
WBC # BLD AUTO: 4.6 10*3/MM3 (ref 3.4–10.8)

## 2020-07-24 PROCEDURE — 25010000002 EPOETIN ALFA-EPBX 2000 UNIT/ML SOLUTION 1 ML VIAL: Performed by: FAMILY MEDICINE

## 2020-07-24 PROCEDURE — 80061 LIPID PANEL: CPT | Performed by: NURSE PRACTITIONER

## 2020-07-24 PROCEDURE — 84484 ASSAY OF TROPONIN QUANT: CPT | Performed by: NURSE PRACTITIONER

## 2020-07-24 PROCEDURE — 93306 TTE W/DOPPLER COMPLETE: CPT

## 2020-07-24 PROCEDURE — 94799 UNLISTED PULMONARY SVC/PX: CPT

## 2020-07-24 PROCEDURE — 85025 COMPLETE CBC W/AUTO DIFF WBC: CPT | Performed by: INTERNAL MEDICINE

## 2020-07-24 PROCEDURE — 93306 TTE W/DOPPLER COMPLETE: CPT | Performed by: INTERNAL MEDICINE

## 2020-07-24 PROCEDURE — 25010000002 PERFLUTREN 6.52 MG/ML SUSPENSION: Performed by: FAMILY MEDICINE

## 2020-07-24 PROCEDURE — 80053 COMPREHEN METABOLIC PANEL: CPT | Performed by: NURSE PRACTITIONER

## 2020-07-24 PROCEDURE — 25010000002 EPOETIN ALFA-EPBX 3000 UNIT/ML SOLUTION 1 ML VIAL: Performed by: FAMILY MEDICINE

## 2020-07-24 PROCEDURE — 83036 HEMOGLOBIN GLYCOSYLATED A1C: CPT | Performed by: NURSE PRACTITIONER

## 2020-07-24 PROCEDURE — 25010000002 CEFTRIAXONE PER 250 MG: Performed by: INTERNAL MEDICINE

## 2020-07-24 RX ORDER — SODIUM POLYSTYRENE SULFONATE 15 G/60ML
15 SUSPENSION ORAL; RECTAL ONCE
Status: COMPLETED | OUTPATIENT
Start: 2020-07-24 | End: 2020-07-24

## 2020-07-24 RX ADMIN — FLECAINIDE ACETATE 50 MG: 50 TABLET ORAL at 08:42

## 2020-07-24 RX ADMIN — METOPROLOL TARTRATE 12.5 MG: 25 TABLET, FILM COATED ORAL at 08:42

## 2020-07-24 RX ADMIN — DOXYCYCLINE 100 MG: 100 INJECTION, POWDER, LYOPHILIZED, FOR SOLUTION INTRAVENOUS at 17:59

## 2020-07-24 RX ADMIN — FOLIC ACID 1 MG: 1 TABLET ORAL at 08:41

## 2020-07-24 RX ADMIN — EPOETIN ALFA-EPBX 5000 UNITS: 3000 INJECTION, SOLUTION INTRAVENOUS; SUBCUTANEOUS at 18:23

## 2020-07-24 RX ADMIN — TAMSULOSIN HYDROCHLORIDE 0.4 MG: 0.4 CAPSULE ORAL at 22:23

## 2020-07-24 RX ADMIN — PERFLUTREN 9.78 MG: 6.52 INJECTION, SUSPENSION INTRAVENOUS at 11:45

## 2020-07-24 RX ADMIN — SODIUM BICARBONATE 650 MG: 650 TABLET ORAL at 08:42

## 2020-07-24 RX ADMIN — DOXYCYCLINE 100 MG: 100 INJECTION, POWDER, LYOPHILIZED, FOR SOLUTION INTRAVENOUS at 06:05

## 2020-07-24 RX ADMIN — SODIUM CHLORIDE, PRESERVATIVE FREE 10 ML: 5 INJECTION INTRAVENOUS at 08:43

## 2020-07-24 RX ADMIN — SODIUM CHLORIDE 100 ML/HR: 9 INJECTION, SOLUTION INTRAVENOUS at 12:53

## 2020-07-24 RX ADMIN — PANTOPRAZOLE SODIUM 40 MG: 40 TABLET, DELAYED RELEASE ORAL at 08:41

## 2020-07-24 RX ADMIN — CEFTRIAXONE SODIUM 2 G: 2 INJECTION, POWDER, FOR SOLUTION INTRAMUSCULAR; INTRAVENOUS at 12:53

## 2020-07-24 RX ADMIN — SODIUM BICARBONATE 650 MG: 650 TABLET ORAL at 22:23

## 2020-07-24 RX ADMIN — SODIUM CHLORIDE 100 ML/HR: 9 INJECTION, SOLUTION INTRAVENOUS at 01:02

## 2020-07-24 RX ADMIN — SODIUM POLYSTYRENE SULFONATE 15 G: 15 SUSPENSION ORAL; RECTAL at 18:24

## 2020-07-25 LAB
ALBUMIN SERPL-MCNC: 2.8 G/DL (ref 3.5–5.2)
ALBUMIN/GLOB SERPL: 0.8 G/DL
ALP SERPL-CCNC: 67 U/L (ref 39–117)
ALT SERPL W P-5'-P-CCNC: 24 U/L (ref 1–41)
ANION GAP SERPL CALCULATED.3IONS-SCNC: 9 MMOL/L (ref 5–15)
AST SERPL-CCNC: 35 U/L (ref 1–40)
BACTERIA SPEC RESP CULT: NORMAL
BASOPHILS # BLD AUTO: 0 10*3/MM3 (ref 0–0.2)
BASOPHILS NFR BLD AUTO: 0 % (ref 0–1.5)
BH CV ECHO MEAS - AO MAX PG (FULL): 6.4 MMHG
BH CV ECHO MEAS - AO MAX PG: 10.6 MMHG
BH CV ECHO MEAS - AO MEAN PG (FULL): 4 MMHG
BH CV ECHO MEAS - AO MEAN PG: 6 MMHG
BH CV ECHO MEAS - AO ROOT AREA (BSA CORRECTED): 2
BH CV ECHO MEAS - AO ROOT AREA: 9.6 CM^2
BH CV ECHO MEAS - AO ROOT DIAM: 3.5 CM
BH CV ECHO MEAS - AO V2 MAX: 163 CM/SEC
BH CV ECHO MEAS - AO V2 MEAN: 112 CM/SEC
BH CV ECHO MEAS - AO V2 VTI: 36.6 CM
BH CV ECHO MEAS - AVA(I,A): 2.4 CM^2
BH CV ECHO MEAS - AVA(I,D): 2.4 CM^2
BH CV ECHO MEAS - AVA(V,A): 2.4 CM^2
BH CV ECHO MEAS - AVA(V,D): 2.4 CM^2
BH CV ECHO MEAS - BSA(HAYCOCK): 1.7 M^2
BH CV ECHO MEAS - BSA: 1.7 M^2
BH CV ECHO MEAS - BZI_BMI: 25.2 KILOGRAMS/M^2
BH CV ECHO MEAS - BZI_METRIC_HEIGHT: 162.6 CM
BH CV ECHO MEAS - BZI_METRIC_WEIGHT: 66.7 KG
BH CV ECHO MEAS - EDV(CUBED): 102.5 ML
BH CV ECHO MEAS - EDV(MOD-SP4): 92.7 ML
BH CV ECHO MEAS - EDV(TEICH): 101.3 ML
BH CV ECHO MEAS - EF(CUBED): 71.2 %
BH CV ECHO MEAS - EF(MOD-SP4): 60.1 %
BH CV ECHO MEAS - EF(TEICH): 62.9 %
BH CV ECHO MEAS - ESV(CUBED): 29.5 ML
BH CV ECHO MEAS - ESV(MOD-SP4): 37 ML
BH CV ECHO MEAS - ESV(TEICH): 37.6 ML
BH CV ECHO MEAS - FS: 34 %
BH CV ECHO MEAS - IVS/LVPW: 1.1
BH CV ECHO MEAS - IVSD: 0.88 CM
BH CV ECHO MEAS - LA DIMENSION: 3.6 CM
BH CV ECHO MEAS - LA/AO: 1
BH CV ECHO MEAS - LAT PEAK E' VEL: 8.2 CM/SEC
BH CV ECHO MEAS - LV DIASTOLIC VOL/BSA (35-75): 54 ML/M^2
BH CV ECHO MEAS - LV MASS(C)D: 133.4 GRAMS
BH CV ECHO MEAS - LV MASS(C)DI: 77.7 GRAMS/M^2
BH CV ECHO MEAS - LV MAX PG: 4.2 MMHG
BH CV ECHO MEAS - LV MEAN PG: 2 MMHG
BH CV ECHO MEAS - LV SYSTOLIC VOL/BSA (12-30): 21.6 ML/M^2
BH CV ECHO MEAS - LV V1 MAX: 103 CM/SEC
BH CV ECHO MEAS - LV V1 MEAN: 71.8 CM/SEC
BH CV ECHO MEAS - LV V1 VTI: 23.2 CM
BH CV ECHO MEAS - LVIDD: 4.7 CM
BH CV ECHO MEAS - LVIDS: 3.1 CM
BH CV ECHO MEAS - LVLD AP4: 8.2 CM
BH CV ECHO MEAS - LVLS AP4: 7 CM
BH CV ECHO MEAS - LVOT AREA (M): 3.8 CM^2
BH CV ECHO MEAS - LVOT AREA: 3.8 CM^2
BH CV ECHO MEAS - LVOT DIAM: 2.2 CM
BH CV ECHO MEAS - LVPWD: 0.84 CM
BH CV ECHO MEAS - MED PEAK E' VEL: 5.55 CM/SEC
BH CV ECHO MEAS - MV A MAX VEL: 114 CM/SEC
BH CV ECHO MEAS - MV DEC TIME: 0.32 SEC
BH CV ECHO MEAS - MV E MAX VEL: 84.6 CM/SEC
BH CV ECHO MEAS - MV E/A: 0.74
BH CV ECHO MEAS - PA MAX PG: 1.4 MMHG
BH CV ECHO MEAS - PA V2 MAX: 58.7 CM/SEC
BH CV ECHO MEAS - RAP SYSTOLE: 5 MMHG
BH CV ECHO MEAS - RVSP: 38.4 MMHG
BH CV ECHO MEAS - SI(AO): 205.2 ML/M^2
BH CV ECHO MEAS - SI(CUBED): 42.5 ML/M^2
BH CV ECHO MEAS - SI(LVOT): 51.4 ML/M^2
BH CV ECHO MEAS - SI(MOD-SP4): 32.5 ML/M^2
BH CV ECHO MEAS - SI(TEICH): 37.1 ML/M^2
BH CV ECHO MEAS - SV(AO): 352.1 ML
BH CV ECHO MEAS - SV(CUBED): 73 ML
BH CV ECHO MEAS - SV(LVOT): 88.2 ML
BH CV ECHO MEAS - SV(MOD-SP4): 55.7 ML
BH CV ECHO MEAS - SV(TEICH): 63.7 ML
BH CV ECHO MEAS - TR MAX VEL: 289 CM/SEC
BH CV ECHO MEASUREMENTS AVERAGE E/E' RATIO: 12.31
BILIRUB SERPL-MCNC: 0.2 MG/DL (ref 0–1.2)
BUN SERPL-MCNC: 23 MG/DL (ref 8–23)
BUN/CREAT SERPL: 12 (ref 7–25)
CALCIUM SPEC-SCNC: 9.4 MG/DL (ref 8.6–10.5)
CHLORIDE SERPL-SCNC: 108 MMOL/L (ref 98–107)
CO2 SERPL-SCNC: 23 MMOL/L (ref 22–29)
CREAT SERPL-MCNC: 1.92 MG/DL (ref 0.76–1.27)
DEPRECATED RDW RBC AUTO: 75.8 FL (ref 37–54)
EOSINOPHIL # BLD AUTO: 0.06 10*3/MM3 (ref 0–0.4)
EOSINOPHIL NFR BLD AUTO: 0.9 % (ref 0.3–6.2)
ERYTHROCYTE [DISTWIDTH] IN BLOOD BY AUTOMATED COUNT: 20.2 % (ref 12.3–15.4)
GFR SERPL CREATININE-BSD FRML MDRD: 34 ML/MIN/1.73
GLOBULIN UR ELPH-MCNC: 3.3 GM/DL
GLUCOSE SERPL-MCNC: 87 MG/DL (ref 65–99)
GRAM STN SPEC: NORMAL
HCT VFR BLD AUTO: 24.7 % (ref 37.5–51)
HGB BLD-MCNC: 7.8 G/DL (ref 13–17.7)
IRON 24H UR-MRATE: 80 MCG/DL (ref 59–158)
IRON SATN MFR SERPL: 50 % (ref 20–50)
LEFT ATRIUM VOLUME INDEX: 31.5 ML/M2
LEFT ATRIUM VOLUME: 54.1 CM3
LV EF 2D ECHO EST: 55 %
LYMPHOCYTES # BLD AUTO: 0.61 10*3/MM3 (ref 0.7–3.1)
LYMPHOCYTES NFR BLD AUTO: 9.1 % (ref 19.6–45.3)
MAXIMAL PREDICTED HEART RATE: 142 BPM
MCH RBC QN AUTO: 33.8 PG (ref 26.6–33)
MCHC RBC AUTO-ENTMCNC: 31.6 G/DL (ref 31.5–35.7)
MCV RBC AUTO: 106.9 FL (ref 79–97)
MONOCYTES # BLD AUTO: 0.61 10*3/MM3 (ref 0.1–0.9)
MONOCYTES NFR BLD AUTO: 9.1 % (ref 5–12)
NEUTROPHILS NFR BLD AUTO: 5.35 10*3/MM3 (ref 1.7–7)
NEUTROPHILS NFR BLD AUTO: 80.2 % (ref 42.7–76)
PLATELET # BLD AUTO: 75 10*3/MM3 (ref 140–450)
PMV BLD AUTO: 11.2 FL (ref 6–12)
POTASSIUM SERPL-SCNC: 4.3 MMOL/L (ref 3.5–5.2)
PROT SERPL-MCNC: 6.1 G/DL (ref 6–8.5)
RBC # BLD AUTO: 2.31 10*6/MM3 (ref 4.14–5.8)
SODIUM SERPL-SCNC: 140 MMOL/L (ref 136–145)
STRESS TARGET HR: 121 BPM
TIBC SERPL-MCNC: 159 MCG/DL (ref 298–536)
TRANSFERRIN SERPL-MCNC: 107 MG/DL (ref 200–360)
WBC # BLD AUTO: 6.68 10*3/MM3 (ref 3.4–10.8)

## 2020-07-25 PROCEDURE — 83540 ASSAY OF IRON: CPT | Performed by: FAMILY MEDICINE

## 2020-07-25 PROCEDURE — 94799 UNLISTED PULMONARY SVC/PX: CPT

## 2020-07-25 PROCEDURE — 84466 ASSAY OF TRANSFERRIN: CPT | Performed by: FAMILY MEDICINE

## 2020-07-25 PROCEDURE — 80053 COMPREHEN METABOLIC PANEL: CPT | Performed by: FAMILY MEDICINE

## 2020-07-25 PROCEDURE — 85025 COMPLETE CBC W/AUTO DIFF WBC: CPT | Performed by: INTERNAL MEDICINE

## 2020-07-25 PROCEDURE — 25010000002 CEFTRIAXONE PER 250 MG: Performed by: INTERNAL MEDICINE

## 2020-07-25 PROCEDURE — 97166 OT EVAL MOD COMPLEX 45 MIN: CPT

## 2020-07-25 RX ORDER — IPRATROPIUM BROMIDE AND ALBUTEROL SULFATE 2.5; .5 MG/3ML; MG/3ML
3 SOLUTION RESPIRATORY (INHALATION)
Status: DISCONTINUED | OUTPATIENT
Start: 2020-07-26 | End: 2020-07-25

## 2020-07-25 RX ORDER — IPRATROPIUM BROMIDE AND ALBUTEROL SULFATE 2.5; .5 MG/3ML; MG/3ML
3 SOLUTION RESPIRATORY (INHALATION) EVERY 4 HOURS PRN
Status: DISCONTINUED | OUTPATIENT
Start: 2020-07-25 | End: 2020-07-26 | Stop reason: HOSPADM

## 2020-07-25 RX ORDER — IPRATROPIUM BROMIDE AND ALBUTEROL SULFATE 2.5; .5 MG/3ML; MG/3ML
3 SOLUTION RESPIRATORY (INHALATION)
Status: DISCONTINUED | OUTPATIENT
Start: 2020-07-26 | End: 2020-07-26 | Stop reason: HOSPADM

## 2020-07-25 RX ADMIN — METOPROLOL TARTRATE 12.5 MG: 25 TABLET, FILM COATED ORAL at 09:20

## 2020-07-25 RX ADMIN — SODIUM BICARBONATE 650 MG: 650 TABLET ORAL at 09:20

## 2020-07-25 RX ADMIN — FOLIC ACID 1 MG: 1 TABLET ORAL at 09:20

## 2020-07-25 RX ADMIN — IPRATROPIUM BROMIDE AND ALBUTEROL SULFATE 3 ML: 2.5; .5 SOLUTION RESPIRATORY (INHALATION) at 23:46

## 2020-07-25 RX ADMIN — CEFTRIAXONE SODIUM 2 G: 2 INJECTION, POWDER, FOR SOLUTION INTRAMUSCULAR; INTRAVENOUS at 11:51

## 2020-07-25 RX ADMIN — DOXYCYCLINE 100 MG: 100 INJECTION, POWDER, LYOPHILIZED, FOR SOLUTION INTRAVENOUS at 05:53

## 2020-07-25 RX ADMIN — SODIUM CHLORIDE, PRESERVATIVE FREE 10 ML: 5 INJECTION INTRAVENOUS at 09:21

## 2020-07-25 RX ADMIN — SODIUM CHLORIDE 50 ML/HR: 9 INJECTION, SOLUTION INTRAVENOUS at 03:22

## 2020-07-25 RX ADMIN — DOXYCYCLINE 100 MG: 100 INJECTION, POWDER, LYOPHILIZED, FOR SOLUTION INTRAVENOUS at 17:44

## 2020-07-25 RX ADMIN — SODIUM CHLORIDE, PRESERVATIVE FREE 10 ML: 5 INJECTION INTRAVENOUS at 22:14

## 2020-07-25 RX ADMIN — SODIUM BICARBONATE 650 MG: 650 TABLET ORAL at 22:14

## 2020-07-25 RX ADMIN — TAMSULOSIN HYDROCHLORIDE 0.4 MG: 0.4 CAPSULE ORAL at 22:14

## 2020-07-25 RX ADMIN — FLECAINIDE ACETATE 50 MG: 50 TABLET ORAL at 09:20

## 2020-07-25 RX ADMIN — PANTOPRAZOLE SODIUM 40 MG: 40 TABLET, DELAYED RELEASE ORAL at 09:20

## 2020-07-26 VITALS
DIASTOLIC BLOOD PRESSURE: 62 MMHG | OXYGEN SATURATION: 93 % | HEART RATE: 96 BPM | TEMPERATURE: 98.8 F | HEIGHT: 64 IN | RESPIRATION RATE: 16 BRPM | SYSTOLIC BLOOD PRESSURE: 115 MMHG | BODY MASS INDEX: 25.1 KG/M2 | WEIGHT: 147 LBS

## 2020-07-26 LAB
ALBUMIN SERPL-MCNC: 2.8 G/DL (ref 3.5–5.2)
ALBUMIN/GLOB SERPL: 0.8 G/DL
ALP SERPL-CCNC: 70 U/L (ref 39–117)
ALT SERPL W P-5'-P-CCNC: 17 U/L (ref 1–41)
ANION GAP SERPL CALCULATED.3IONS-SCNC: 13 MMOL/L (ref 5–15)
ANISOCYTOSIS BLD QL: ABNORMAL
AST SERPL-CCNC: 28 U/L (ref 1–40)
BACTERIA SPEC AEROBE CULT: ABNORMAL
BACTERIA SPEC AEROBE CULT: ABNORMAL
BILIRUB SERPL-MCNC: 0.2 MG/DL (ref 0–1.2)
BUN SERPL-MCNC: 17 MG/DL (ref 8–23)
BUN/CREAT SERPL: 9.8 (ref 7–25)
CALCIUM SPEC-SCNC: 9.4 MG/DL (ref 8.6–10.5)
CHLORIDE SERPL-SCNC: 104 MMOL/L (ref 98–107)
CO2 SERPL-SCNC: 23 MMOL/L (ref 22–29)
CREAT SERPL-MCNC: 1.73 MG/DL (ref 0.76–1.27)
DEPRECATED RDW RBC AUTO: 78 FL (ref 37–54)
EOSINOPHIL # BLD MANUAL: 0.07 10*3/MM3 (ref 0–0.4)
EOSINOPHIL NFR BLD MANUAL: 1 % (ref 0.3–6.2)
ERYTHROCYTE [DISTWIDTH] IN BLOOD BY AUTOMATED COUNT: 20.5 % (ref 12.3–15.4)
GFR SERPL CREATININE-BSD FRML MDRD: 38 ML/MIN/1.73
GLOBULIN UR ELPH-MCNC: 3.3 GM/DL
GLUCOSE SERPL-MCNC: 116 MG/DL (ref 65–99)
GRAM STN SPEC: ABNORMAL
HCT VFR BLD AUTO: 25.5 % (ref 37.5–51)
HGB BLD-MCNC: 8 G/DL (ref 13–17.7)
ISOLATED FROM: ABNORMAL
ISOLATED FROM: ABNORMAL
LYMPHOCYTES # BLD MANUAL: 0.22 10*3/MM3 (ref 0.7–3.1)
LYMPHOCYTES NFR BLD MANUAL: 3 % (ref 19.6–45.3)
LYMPHOCYTES NFR BLD MANUAL: 4 % (ref 5–12)
MCH RBC QN AUTO: 33.8 PG (ref 26.6–33)
MCHC RBC AUTO-ENTMCNC: 31.4 G/DL (ref 31.5–35.7)
MCV RBC AUTO: 107.6 FL (ref 79–97)
MICROCYTES BLD QL: ABNORMAL
MONOCYTES # BLD AUTO: 0.3 10*3/MM3 (ref 0.1–0.9)
NEUTROPHILS # BLD AUTO: 6.8 10*3/MM3 (ref 1.7–7)
NEUTROPHILS NFR BLD MANUAL: 92 % (ref 42.7–76)
PLAT MORPH BLD: NORMAL
PLATELET # BLD AUTO: 114 10*3/MM3 (ref 140–450)
PMV BLD AUTO: 11.4 FL (ref 6–12)
POLYCHROMASIA BLD QL SMEAR: ABNORMAL
POTASSIUM SERPL-SCNC: 3.9 MMOL/L (ref 3.5–5.2)
PROT SERPL-MCNC: 6.1 G/DL (ref 6–8.5)
RBC # BLD AUTO: 2.37 10*6/MM3 (ref 4.14–5.8)
SODIUM SERPL-SCNC: 140 MMOL/L (ref 136–145)
WBC # BLD AUTO: 7.39 10*3/MM3 (ref 3.4–10.8)
WBC MORPH BLD: NORMAL

## 2020-07-26 PROCEDURE — 25010000002 CEFTRIAXONE PER 250 MG: Performed by: INTERNAL MEDICINE

## 2020-07-26 PROCEDURE — 85025 COMPLETE CBC W/AUTO DIFF WBC: CPT | Performed by: INTERNAL MEDICINE

## 2020-07-26 PROCEDURE — 94799 UNLISTED PULMONARY SVC/PX: CPT

## 2020-07-26 PROCEDURE — 80053 COMPREHEN METABOLIC PANEL: CPT | Performed by: FAMILY MEDICINE

## 2020-07-26 PROCEDURE — 85007 BL SMEAR W/DIFF WBC COUNT: CPT | Performed by: INTERNAL MEDICINE

## 2020-07-26 RX ORDER — AMOXICILLIN 250 MG
1 CAPSULE ORAL DAILY PRN
Start: 2020-07-26 | End: 2021-08-25

## 2020-07-26 RX ORDER — CEFDINIR 300 MG/1
300 CAPSULE ORAL 2 TIMES DAILY
Qty: 20 CAPSULE | Refills: 0 | Status: SHIPPED | OUTPATIENT
Start: 2020-07-26 | End: 2020-08-05

## 2020-07-26 RX ADMIN — FOLIC ACID 1 MG: 1 TABLET ORAL at 08:42

## 2020-07-26 RX ADMIN — PANTOPRAZOLE SODIUM 40 MG: 40 TABLET, DELAYED RELEASE ORAL at 08:47

## 2020-07-26 RX ADMIN — METOPROLOL TARTRATE 12.5 MG: 25 TABLET, FILM COATED ORAL at 11:07

## 2020-07-26 RX ADMIN — IPRATROPIUM BROMIDE AND ALBUTEROL SULFATE 3 ML: 2.5; .5 SOLUTION RESPIRATORY (INHALATION) at 07:17

## 2020-07-26 RX ADMIN — SODIUM CHLORIDE 50 ML/HR: 9 INJECTION, SOLUTION INTRAVENOUS at 00:25

## 2020-07-26 RX ADMIN — SODIUM CHLORIDE, PRESERVATIVE FREE 10 ML: 5 INJECTION INTRAVENOUS at 08:44

## 2020-07-26 RX ADMIN — Medication 6 MG: at 01:15

## 2020-07-26 RX ADMIN — SODIUM BICARBONATE 650 MG: 650 TABLET ORAL at 08:42

## 2020-07-26 RX ADMIN — DOXYCYCLINE 100 MG: 100 INJECTION, POWDER, LYOPHILIZED, FOR SOLUTION INTRAVENOUS at 06:25

## 2020-07-26 RX ADMIN — CEFTRIAXONE SODIUM 2 G: 2 INJECTION, POWDER, FOR SOLUTION INTRAMUSCULAR; INTRAVENOUS at 11:07

## 2020-07-26 RX ADMIN — FLECAINIDE ACETATE 50 MG: 50 TABLET ORAL at 08:44

## 2020-07-27 ENCOUNTER — READMISSION MANAGEMENT (OUTPATIENT)
Dept: CALL CENTER | Facility: HOSPITAL | Age: 80
End: 2020-07-27

## 2020-07-27 NOTE — OUTREACH NOTE
Prep Survey      Responses   [facility] patient discharged from?  [facility]   Is LACE score < 7 ?  No   Eligibility  Readm Mgmt   Discharge diagnosis  Sepsis, cellulitis, Acute resp. failure with hypoxia, fibrotic lung disease, CKD III   COVID-19 Test Status  Negative   Does the patient have one of the following disease processes/diagnoses(primary or secondary)?  Sepsis   Does the patient have Home health ordered?  No   Is there a DME ordered?  No   Comments regarding appointments  Pt will be notified   Medication alerts for this patient  see AVS   Prep survey completed?  Yes          Albertina Mike RN

## 2020-07-29 ENCOUNTER — READMISSION MANAGEMENT (OUTPATIENT)
Dept: CALL CENTER | Facility: HOSPITAL | Age: 80
End: 2020-07-29

## 2020-07-29 RX ORDER — HEPARIN SODIUM (PORCINE) LOCK FLUSH IV SOLN 100 UNIT/ML 100 UNIT/ML
500 SOLUTION INTRAVENOUS PRN
Status: CANCELLED | OUTPATIENT
Start: 2020-08-06

## 2020-07-29 RX ORDER — SODIUM CHLORIDE 0.9 % (FLUSH) 0.9 %
10 SYRINGE (ML) INJECTION PRN
Status: CANCELLED | OUTPATIENT
Start: 2020-08-06

## 2020-07-29 RX ORDER — CYANOCOBALAMIN 1000 UG/ML
1000 INJECTION INTRAMUSCULAR; SUBCUTANEOUS ONCE
Status: CANCELLED | OUTPATIENT
Start: 2020-08-06

## 2020-07-29 RX ORDER — SODIUM CHLORIDE 9 MG/ML
20 INJECTION, SOLUTION INTRAVENOUS ONCE
Status: CANCELLED | OUTPATIENT
Start: 2020-08-06

## 2020-07-29 RX ORDER — EPINEPHRINE 1 MG/ML
0.3 INJECTION, SOLUTION, CONCENTRATE INTRAVENOUS PRN
Status: CANCELLED | OUTPATIENT
Start: 2020-08-06

## 2020-07-29 RX ORDER — METHYLPREDNISOLONE SODIUM SUCCINATE 125 MG/2ML
125 INJECTION, POWDER, LYOPHILIZED, FOR SOLUTION INTRAMUSCULAR; INTRAVENOUS PRN
Status: CANCELLED | OUTPATIENT
Start: 2020-08-06

## 2020-07-29 RX ORDER — PALONOSETRON 0.05 MG/ML
0.25 INJECTION, SOLUTION INTRAVENOUS ONCE
Status: CANCELLED | OUTPATIENT
Start: 2020-08-06

## 2020-07-29 RX ORDER — SODIUM CHLORIDE 0.9 % (FLUSH) 0.9 %
5 SYRINGE (ML) INJECTION PRN
Status: CANCELLED | OUTPATIENT
Start: 2020-08-06

## 2020-07-29 RX ORDER — DIPHENHYDRAMINE HYDROCHLORIDE 50 MG/ML
50 INJECTION INTRAMUSCULAR; INTRAVENOUS PRN
Status: CANCELLED | OUTPATIENT
Start: 2020-08-06

## 2020-07-29 NOTE — OUTREACH NOTE
Sepsis Week 1 Survey      Responses   [facility] [place] patient discharged from?  [place]   COVID-19 Test Status  Negative   Does the patient have one of the following disease processes/diagnoses(primary or secondary)?  Sepsis   Is there a successful TCM telephone encounter documented?  No   Week 1 attempt successful?  Yes   Call start time  0811   Call end time  0817   Discharge diagnosis  Sepsis, cellulitis, Acute resp. failure with hypoxia, fibrotic lung disease, CKD III   Is patient permission given to speak with other caregiver?  No   Medication alerts for this patient  see AVS   Meds reviewed with patient/caregiver?  Yes   Is the patient having any side effects they believe may be caused by any medication additions or changes?  No   Does the patient have all medications related to this admission filled (includes all antibiotics, inhalers, nebulizers,steroids,etc.)  Yes   Is the patient taking all medications as directed (includes completed medication regime)?  Yes   Comments regarding appointments  He sees Dr. Michele August the 4th.    Does the patient have a primary care provider?   Yes   Does the patient have an appointment with their PCP within 7 days of discharge?  Yes   Has the patient kept scheduled appointments due by today?  Yes   Has home health visited the patient within 72 hours of discharge?  N/A   Pulse Ox monitoring  None   Psychosocial issues?  No   Did the patient receive a copy of their discharge instructions?  Yes   Nursing interventions  Reviewed instructions with patient   What is the patient's perception of their health status since discharge?  Improving   Nursing interventions  Nurse provided patient education   Is the patient/caregiver able to teach back Sepsis?  S - Shivering,fever or very cold, E - Extreme pain or generalized discomfort (worst ever,especially abdomen), P - Pale or discolored skin, S - Sleepy, difficult to arouse,confused, I -   I feel like I might die-a feeling of  hopelessness, S - Short of breath   Nursing interventions  Nurse provided reassurance to patient   Is patient/caregiver able to teach back steps to recovery at home?  Set small, achievable goals for return to baseline health, Talk about feelings with family/friends, Learn about sepsis(sepsis.org), Exercise as tolerated, Make a list of questions for PCP appoinment, Eat a balanced diet, Record milestones and struggles in a journal, Rest and regain strength   Is the patient/caregiver able to teach back signs and symptoms of worsening condition:  Altered mental status(confusion/coma), Rapid heart rate (>90), Fever, Hyperthermia, Shortness of breath/rapid respiratory rate, Edema   Additional teach back comments  He is getting Chemo every week.  He is taking off this week per MD instructions.    Week 1 call completed?  Yes          Courtney Camilo RN

## 2020-07-30 ENCOUNTER — HOSPITAL ENCOUNTER (OUTPATIENT)
Dept: INFUSION THERAPY | Age: 80
Discharge: HOME | End: 2020-07-30
Payer: MEDICARE

## 2020-07-30 ENCOUNTER — APPOINTMENT (OUTPATIENT)
Dept: INFUSION THERAPY | Age: 80
End: 2020-07-30
Payer: MEDICARE

## 2020-07-30 VITALS
WEIGHT: 160.1 LBS | BODY MASS INDEX: 27.33 KG/M2 | HEIGHT: 64 IN | OXYGEN SATURATION: 91 % | TEMPERATURE: 97.5 F | HEART RATE: 91 BPM

## 2020-07-30 DIAGNOSIS — N18.4 CHRONIC KIDNEY DISEASE, STAGE IV (SEVERE) (HCC): ICD-10-CM

## 2020-07-30 DIAGNOSIS — N18.30 ANEMIA, CHRONIC RENAL FAILURE, STAGE 3 (MODERATE) (HCC): ICD-10-CM

## 2020-07-30 DIAGNOSIS — C80.1 MALIGNANT NEOPLASM (HCC): ICD-10-CM

## 2020-07-30 DIAGNOSIS — D63.1 ANEMIA, CHRONIC RENAL FAILURE, STAGE 3 (MODERATE) (HCC): ICD-10-CM

## 2020-07-30 DIAGNOSIS — C34.90 NON-SMALL CELL LUNG CANCER, UNSPECIFIED LATERALITY (HCC): Primary | ICD-10-CM

## 2020-07-30 PROCEDURE — 85025 COMPLETE CBC W/AUTO DIFF WBC: CPT

## 2020-07-30 PROCEDURE — 6360000002 HC RX W HCPCS: Performed by: PHYSICIAN ASSISTANT

## 2020-07-30 PROCEDURE — 96372 THER/PROPH/DIAG INJ SC/IM: CPT

## 2020-07-30 RX ADMIN — EPOETIN ALFA-EPBX 20000 UNITS: 10000 INJECTION, SOLUTION INTRAVENOUS; SUBCUTANEOUS at 09:53

## 2020-08-04 ENCOUNTER — APPOINTMENT (OUTPATIENT)
Dept: INFUSION THERAPY | Age: 80
End: 2020-08-04
Payer: MEDICARE

## 2020-08-06 ENCOUNTER — HOSPITAL ENCOUNTER (OUTPATIENT)
Dept: INFUSION THERAPY | Age: 80
Discharge: HOME | End: 2020-08-06
Payer: MEDICARE

## 2020-08-06 ENCOUNTER — READMISSION MANAGEMENT (OUTPATIENT)
Dept: CALL CENTER | Facility: HOSPITAL | Age: 80
End: 2020-08-06

## 2020-08-06 VITALS
TEMPERATURE: 98 F | SYSTOLIC BLOOD PRESSURE: 144 MMHG | HEIGHT: 64 IN | HEART RATE: 77 BPM | DIASTOLIC BLOOD PRESSURE: 82 MMHG | OXYGEN SATURATION: 98 % | WEIGHT: 158 LBS | BODY MASS INDEX: 26.98 KG/M2

## 2020-08-06 DIAGNOSIS — C34.11 MALIGNANT NEOPLASM OF UPPER LOBE, RIGHT BRONCHUS OR LUNG (HCC): ICD-10-CM

## 2020-08-06 DIAGNOSIS — N18.30 ANEMIA, CHRONIC RENAL FAILURE, STAGE 3 (MODERATE) (HCC): ICD-10-CM

## 2020-08-06 DIAGNOSIS — C34.90 NON-SMALL CELL LUNG CANCER, UNSPECIFIED LATERALITY (HCC): Primary | ICD-10-CM

## 2020-08-06 DIAGNOSIS — N18.4 CHRONIC KIDNEY DISEASE, STAGE IV (SEVERE) (HCC): ICD-10-CM

## 2020-08-06 DIAGNOSIS — R53.83 FATIGUE DUE TO TREATMENT: ICD-10-CM

## 2020-08-06 DIAGNOSIS — C80.1 MALIGNANT NEOPLASM (HCC): ICD-10-CM

## 2020-08-06 DIAGNOSIS — D63.1 ANEMIA, CHRONIC RENAL FAILURE, STAGE 3 (MODERATE) (HCC): ICD-10-CM

## 2020-08-06 LAB
ALBUMIN SERPL-MCNC: 3.6 G/DL (ref 3.5–5.2)
ALP BLD-CCNC: 110 U/L (ref 40–130)
ALT SERPL-CCNC: 15 U/L (ref 21–72)
ANION GAP SERPL CALCULATED.3IONS-SCNC: 4 MMOL/L (ref 7–19)
AST SERPL-CCNC: 40 U/L (ref 17–59)
BASOPHILS ABSOLUTE: 0.03 K/UL (ref 0.01–0.08)
BASOPHILS RELATIVE PERCENT: 0.2 % (ref 0.1–1.2)
BILIRUB SERPL-MCNC: 0.3 MG/DL (ref 0.2–1.3)
BUN BLDV-MCNC: 27 MG/DL (ref 9–20)
CALCIUM SERPL-MCNC: 9.7 MG/DL (ref 8.4–10.2)
CHLORIDE BLD-SCNC: 104 MMOL/L (ref 98–111)
CO2: 27 MMOL/L (ref 22–29)
CREAT SERPL-MCNC: 2.2 MG/DL (ref 0.6–1.2)
EOSINOPHILS ABSOLUTE: 0.01 K/UL (ref 0.04–0.54)
EOSINOPHILS RELATIVE PERCENT: 0.1 % (ref 0.7–7)
GFR NON-AFRICAN AMERICAN: 29
GLOBULIN: 4.1 G/DL
GLUCOSE BLD-MCNC: 126 MG/DL (ref 74–106)
HCT VFR BLD CALC: 30.7 % (ref 40.1–51)
HEMOGLOBIN: 9.6 G/DL (ref 13.7–17.5)
LYMPHOCYTES ABSOLUTE: 0.95 K/UL (ref 1.18–3.74)
LYMPHOCYTES RELATIVE PERCENT: 6.4 % (ref 19.3–53.1)
MCH RBC QN AUTO: 35.4 PG (ref 25.7–32.2)
MCHC RBC AUTO-ENTMCNC: 31.3 G/DL (ref 32.3–36.5)
MCV RBC AUTO: 113.3 FL (ref 79–92.2)
MONOCYTES ABSOLUTE: 1.42 K/UL (ref 0.24–0.82)
MONOCYTES RELATIVE PERCENT: 9.6 % (ref 4.7–12.5)
NEUTROPHILS ABSOLUTE: 12.36 K/UL (ref 1.56–6.13)
NEUTROPHILS RELATIVE PERCENT: 83.7 % (ref 34–71.1)
PDW BLD-RTO: 21 % (ref 11.6–14.4)
PLATELET # BLD: 325 K/UL (ref 163–337)
PMV BLD AUTO: 10.4 FL (ref 7.4–10.4)
POTASSIUM SERPL-SCNC: 4.8 MMOL/L (ref 3.5–5.1)
RBC # BLD: 2.71 M/UL (ref 4.63–6.08)
SODIUM BLD-SCNC: 135 MMOL/L (ref 137–145)
TOTAL PROTEIN: 7.7 G/DL (ref 6.3–8.2)
TSH SERPL DL<=0.05 MIU/L-ACNC: 1.67 UIU/ML (ref 0.47–4.68)
WBC # BLD: 14.77 K/UL (ref 4.23–9.07)

## 2020-08-06 PROCEDURE — 2580000003 HC RX 258: Performed by: INTERNAL MEDICINE

## 2020-08-06 PROCEDURE — 80053 COMPREHEN METABOLIC PANEL: CPT

## 2020-08-06 PROCEDURE — 96417 CHEMO IV INFUS EACH ADDL SEQ: CPT

## 2020-08-06 PROCEDURE — 96372 THER/PROPH/DIAG INJ SC/IM: CPT

## 2020-08-06 PROCEDURE — 96375 TX/PRO/DX INJ NEW DRUG ADDON: CPT

## 2020-08-06 PROCEDURE — 6360000002 HC RX W HCPCS: Performed by: PHYSICIAN ASSISTANT

## 2020-08-06 PROCEDURE — 85025 COMPLETE CBC W/AUTO DIFF WBC: CPT

## 2020-08-06 PROCEDURE — 84443 ASSAY THYROID STIM HORMONE: CPT

## 2020-08-06 PROCEDURE — 96413 CHEMO IV INFUSION 1 HR: CPT

## 2020-08-06 PROCEDURE — 6360000002 HC RX W HCPCS: Performed by: INTERNAL MEDICINE

## 2020-08-06 RX ORDER — PALONOSETRON 0.05 MG/ML
0.25 INJECTION, SOLUTION INTRAVENOUS ONCE
Status: COMPLETED | OUTPATIENT
Start: 2020-08-06 | End: 2020-08-06

## 2020-08-06 RX ORDER — DEXAMETHASONE SODIUM PHOSPHATE 10 MG/ML
10 INJECTION, SOLUTION INTRAMUSCULAR; INTRAVENOUS ONCE
Status: COMPLETED | OUTPATIENT
Start: 2020-08-06 | End: 2020-08-06

## 2020-08-06 RX ORDER — SODIUM CHLORIDE 0.9 % (FLUSH) 0.9 %
10 SYRINGE (ML) INJECTION PRN
Status: DISCONTINUED | OUTPATIENT
Start: 2020-08-06 | End: 2020-08-07 | Stop reason: HOSPADM

## 2020-08-06 RX ORDER — HEPARIN SODIUM (PORCINE) LOCK FLUSH IV SOLN 100 UNIT/ML 100 UNIT/ML
500 SOLUTION INTRAVENOUS PRN
Status: DISCONTINUED | OUTPATIENT
Start: 2020-08-06 | End: 2020-08-08 | Stop reason: HOSPADM

## 2020-08-06 RX ORDER — CYANOCOBALAMIN 1000 UG/ML
1000 INJECTION INTRAMUSCULAR; SUBCUTANEOUS ONCE
Status: COMPLETED | OUTPATIENT
Start: 2020-08-06 | End: 2020-08-06

## 2020-08-06 RX ADMIN — SODIUM CHLORIDE, PRESERVATIVE FREE 10 ML: 5 INJECTION INTRAVENOUS at 10:50

## 2020-08-06 RX ADMIN — DEXAMETHASONE SODIUM PHOSPHATE 10 MG: 10 INJECTION, SOLUTION INTRAMUSCULAR; INTRAVENOUS at 09:48

## 2020-08-06 RX ADMIN — HEPARIN 500 UNITS: 100 SYRINGE at 10:50

## 2020-08-06 RX ADMIN — PALONOSETRON 0.25 MG: 0.05 INJECTION, SOLUTION INTRAVENOUS at 09:48

## 2020-08-06 RX ADMIN — SODIUM CHLORIDE 200 MG: 9 INJECTION, SOLUTION INTRAVENOUS at 10:03

## 2020-08-06 RX ADMIN — EPOETIN ALFA-EPBX 20000 UNITS: 10000 INJECTION, SOLUTION INTRAVENOUS; SUBCUTANEOUS at 10:50

## 2020-08-06 RX ADMIN — CYANOCOBALAMIN 1000 MCG: 1000 INJECTION, SOLUTION INTRAMUSCULAR; SUBCUTANEOUS at 10:50

## 2020-08-06 RX ADMIN — SODIUM CHLORIDE 520 MG: 9 INJECTION, SOLUTION INTRAVENOUS at 10:35

## 2020-08-06 ASSESSMENT — PAIN SCALES - GENERAL: PAINLEVEL_OUTOF10: 0

## 2020-08-06 NOTE — OUTREACH NOTE
Sepsis Week 2 Survey      Responses   [facility] patient discharged from?  [place]   COVID-19 Test Status  Negative   Does the patient have one of the following disease processes/diagnoses(primary or secondary)?  Sepsis   Week 2 attempt successful?  Yes   Call start time  1531   Call end time  1535   Discharge diagnosis  Sepsis, cellulitis, Acute resp. failure with hypoxia, fibrotic lung disease, CKD III   Meds reviewed with patient/caregiver?  Yes   Is the patient having any side effects they believe may be caused by any medication additions or changes?  No   Does the patient have all medications related to this admission filled (includes all antibiotics, inhalers, nebulizers,steroids,etc.)  Yes   Is the patient taking all medications as directed (includes completed medication regime)?  Yes   Medication comments  States he has two more days of antibiotics   Does the patient have a primary care provider?   Yes   Does the patient have an appointment with their PCP within 7 days of discharge?  Greater than 7 days   Has the patient kept scheduled appointments due by today?  Yes   Comments  Patient has seen Dr. Michele and has appt with Dr. Alexis on Aug 11   Has home health visited the patient within 72 hours of discharge?  N/A   Pulse Ox monitoring  None   Did the patient receive a copy of their discharge instructions?  Yes   Nursing interventions  Reviewed instructions with patient   What is the patient's perception of their health status since discharge?  Improving   Nursing interventions  Nurse provided patient education   Is the patient/caregiver able to teach back Sepsis?  S - Shivering,fever or very cold, E - Extreme pain or generalized discomfort (worst ever,especially abdomen), S - Short of breath   Nursing interventions  Nurse provided patient education   Is patient/caregiver able to teach back steps to recovery at home?  Rest and regain strength, Set small, achievable goals for return to baseline health    Is the patient/caregiver able to teach back signs and symptoms of worsening condition:  Fever, Rapid heart rate (>90), Hyperthermia, Shortness of breath/rapid respiratory rate   Is the patient/caregiver able to teach back the hierarchy of who to call/visit for symptoms/problems? PCP, Specialist, Home health nurse, Urgent Care, ED, 911  Yes   Additional teach back comments  States he had Chemo and they told him his blood work looked good.  He has a follow up appt with Dr. Alexis on Aug 11 that he already had scheduled.     Week 2 call completed?  Yes   Wrap up additional comments  Denies questions or needs at this time          Ericka Nova LPN

## 2020-08-13 ENCOUNTER — HOSPITAL ENCOUNTER (OUTPATIENT)
Dept: INFUSION THERAPY | Age: 80
Discharge: HOME | End: 2020-08-13
Payer: MEDICARE

## 2020-08-13 VITALS
BODY MASS INDEX: 26.89 KG/M2 | HEIGHT: 64 IN | HEART RATE: 102 BPM | WEIGHT: 157.5 LBS | TEMPERATURE: 98 F | SYSTOLIC BLOOD PRESSURE: 130 MMHG | OXYGEN SATURATION: 94 % | DIASTOLIC BLOOD PRESSURE: 78 MMHG

## 2020-08-13 DIAGNOSIS — N18.4 CHRONIC KIDNEY DISEASE, STAGE IV (SEVERE) (HCC): ICD-10-CM

## 2020-08-13 DIAGNOSIS — C80.1 MALIGNANT NEOPLASM (HCC): ICD-10-CM

## 2020-08-13 DIAGNOSIS — N18.30 ANEMIA, CHRONIC RENAL FAILURE, STAGE 3 (MODERATE) (HCC): ICD-10-CM

## 2020-08-13 DIAGNOSIS — C34.90 NON-SMALL CELL LUNG CANCER, UNSPECIFIED LATERALITY (HCC): Primary | ICD-10-CM

## 2020-08-13 DIAGNOSIS — D63.1 ANEMIA, CHRONIC RENAL FAILURE, STAGE 3 (MODERATE) (HCC): ICD-10-CM

## 2020-08-13 LAB
BASOPHILS ABSOLUTE: 0.01 K/UL (ref 0.01–0.08)
BASOPHILS RELATIVE PERCENT: 0.3 % (ref 0.1–1.2)
EOSINOPHILS ABSOLUTE: 0.04 K/UL (ref 0.04–0.54)
EOSINOPHILS RELATIVE PERCENT: 1.1 % (ref 0.7–7)
HCT VFR BLD CALC: 31.8 % (ref 40.1–51)
HEMOGLOBIN: 9.7 G/DL (ref 13.7–17.5)
LYMPHOCYTES ABSOLUTE: 0.58 K/UL (ref 1.18–3.74)
LYMPHOCYTES RELATIVE PERCENT: 15.7 % (ref 19.3–53.1)
MCH RBC QN AUTO: 36.2 PG (ref 25.7–32.2)
MCHC RBC AUTO-ENTMCNC: 30.5 G/DL (ref 32.3–36.5)
MCV RBC AUTO: 118.7 FL (ref 79–92.2)
MONOCYTES ABSOLUTE: 0.14 K/UL (ref 0.24–0.82)
MONOCYTES RELATIVE PERCENT: 3.8 % (ref 4.7–12.5)
NEUTROPHILS ABSOLUTE: 2.93 K/UL (ref 1.56–6.13)
NEUTROPHILS RELATIVE PERCENT: 79.1 % (ref 34–71.1)
PDW BLD-RTO: 20.3 % (ref 11.6–14.4)
PLATELET # BLD: 151 K/UL (ref 163–337)
PMV BLD AUTO: 10.7 FL (ref 7.4–10.4)
RBC # BLD: 2.68 M/UL (ref 4.63–6.08)
WBC # BLD: 3.7 K/UL (ref 4.23–9.07)

## 2020-08-13 PROCEDURE — 96372 THER/PROPH/DIAG INJ SC/IM: CPT

## 2020-08-13 PROCEDURE — 85025 COMPLETE CBC W/AUTO DIFF WBC: CPT

## 2020-08-13 PROCEDURE — 6360000002 HC RX W HCPCS: Performed by: PHYSICIAN ASSISTANT

## 2020-08-13 RX ADMIN — EPOETIN ALFA-EPBX 20000 UNITS: 10000 INJECTION, SOLUTION INTRAVENOUS; SUBCUTANEOUS at 13:35

## 2020-08-14 ENCOUNTER — READMISSION MANAGEMENT (OUTPATIENT)
Dept: CALL CENTER | Facility: HOSPITAL | Age: 80
End: 2020-08-14

## 2020-08-14 NOTE — OUTREACH NOTE
Sepsis Week 3 Survey      Responses   [facility] patient discharged from?  [place]   COVID-19 Test Status  Negative   Does the patient have one of the following disease processes/diagnoses(primary or secondary)?  Sepsis   Week 3 attempt successful?  Yes   Call start time  1722   Call end time  1726   Discharge diagnosis  Sepsis, cellulitis, Acute resp. failure with hypoxia, fibrotic lung disease, CKD III   Meds reviewed with patient/caregiver?  Yes   Is the patient having any side effects they believe may be caused by any medication additions or changes?  No   Does the patient have all medications related to this admission filled (includes all antibiotics, inhalers, nebulizers,steroids,etc.)  Yes   Is the patient taking all medications as directed (includes completed medication regime)?  Yes   Does the patient have a primary care provider?   Yes   Does the patient have an appointment with their PCP within 7 days of discharge?  Yes   Has the patient kept scheduled appointments due by today?  Yes   Has home health visited the patient within 72 hours of discharge?  N/A   Pulse Ox monitoring  None   Psychosocial issues?  No   Did the patient receive a copy of their discharge instructions?  Yes   Nursing interventions  Reviewed instructions with patient   What is the patient's perception of their health status since discharge?  Same   Nursing interventions  Nurse provided patient education   Is the patient/caregiver able to teach back Sepsis?  S - Shivering,fever or very cold, E - Extreme pain or generalized discomfort (worst ever,especially abdomen), S - Short of breath   Nursing interventions  Nurse provided patient education, Nurse provided reassurance to patient   Is patient/caregiver able to teach back steps to recovery at home?  Rest and regain strength, Set small, achievable goals for return to baseline health   Is the patient/caregiver able to teach back signs and symptoms of worsening condition:  Fever,  Rapid heart rate (>90), Hyperthermia, Shortness of breath/rapid respiratory rate   Is the patient/caregiver able to teach back the hierarchy of who to call/visit for symptoms/problems? PCP, Specialist, Home health nurse, Urgent Care, ED, 911  Yes   Week 3 call completed?  Yes          Salvatore Falcon RN

## 2020-08-20 ENCOUNTER — HOSPITAL ENCOUNTER (OUTPATIENT)
Dept: INFUSION THERAPY | Age: 80
Discharge: HOME | End: 2020-08-20
Payer: MEDICARE

## 2020-08-20 VITALS
HEIGHT: 64 IN | DIASTOLIC BLOOD PRESSURE: 60 MMHG | OXYGEN SATURATION: 90 % | TEMPERATURE: 97.3 F | SYSTOLIC BLOOD PRESSURE: 120 MMHG | WEIGHT: 156.4 LBS | BODY MASS INDEX: 26.7 KG/M2 | HEART RATE: 80 BPM

## 2020-08-20 DIAGNOSIS — C80.1 MALIGNANT NEOPLASM (HCC): ICD-10-CM

## 2020-08-20 DIAGNOSIS — C34.90 NON-SMALL CELL LUNG CANCER, UNSPECIFIED LATERALITY (HCC): Primary | ICD-10-CM

## 2020-08-20 DIAGNOSIS — N18.30 ANEMIA, CHRONIC RENAL FAILURE, STAGE 3 (MODERATE) (HCC): ICD-10-CM

## 2020-08-20 DIAGNOSIS — N18.4 CHRONIC KIDNEY DISEASE, STAGE IV (SEVERE) (HCC): ICD-10-CM

## 2020-08-20 DIAGNOSIS — D63.1 ANEMIA, CHRONIC RENAL FAILURE, STAGE 3 (MODERATE) (HCC): ICD-10-CM

## 2020-08-20 PROCEDURE — 96372 THER/PROPH/DIAG INJ SC/IM: CPT

## 2020-08-20 PROCEDURE — 85025 COMPLETE CBC W/AUTO DIFF WBC: CPT

## 2020-08-20 PROCEDURE — 6360000002 HC RX W HCPCS: Performed by: PHYSICIAN ASSISTANT

## 2020-08-20 RX ADMIN — EPOETIN ALFA-EPBX 20000 UNITS: 10000 INJECTION, SOLUTION INTRAVENOUS; SUBCUTANEOUS at 14:32

## 2020-08-21 LAB
BASOPHILS ABSOLUTE: 0.02 K/UL (ref 0.01–0.08)
BASOPHILS RELATIVE PERCENT: 0.5 % (ref 0.1–1.2)
EOSINOPHILS ABSOLUTE: 0.13 K/UL (ref 0.04–0.54)
EOSINOPHILS RELATIVE PERCENT: 3.5 % (ref 0.7–7)
HCT VFR BLD CALC: 28.6 % (ref 40.1–51)
HEMOGLOBIN: 9 G/DL (ref 13.7–17.5)
LYMPHOCYTES ABSOLUTE: 0.81 K/UL (ref 1.18–3.74)
LYMPHOCYTES RELATIVE PERCENT: 22.1 % (ref 19.3–53.1)
MCH RBC QN AUTO: 37.3 PG (ref 25.7–32.2)
MCHC RBC AUTO-ENTMCNC: 31.5 G/DL (ref 32.3–36.5)
MCV RBC AUTO: 118.7 FL (ref 79–92.2)
MONOCYTES ABSOLUTE: 0.54 K/UL (ref 0.24–0.82)
MONOCYTES RELATIVE PERCENT: 14.7 % (ref 4.7–12.5)
NEUTROPHILS ABSOLUTE: 2.17 K/UL (ref 1.56–6.13)
NEUTROPHILS RELATIVE PERCENT: 59.2 % (ref 34–71.1)
PDW BLD-RTO: 20.1 % (ref 11.6–14.4)
PLATELET # BLD: 81 K/UL (ref 163–337)
PMV BLD AUTO: 10.7 FL (ref 7.4–10.4)
RBC # BLD: 2.41 M/UL (ref 4.63–6.08)
WBC # BLD: 3.67 K/UL (ref 4.23–9.07)

## 2020-08-24 ENCOUNTER — READMISSION MANAGEMENT (OUTPATIENT)
Dept: CALL CENTER | Facility: HOSPITAL | Age: 80
End: 2020-08-24

## 2020-08-24 NOTE — OUTREACH NOTE
Sepsis Week 4 Survey      Responses   [facility] patient discharged from?  [place]   COVID-19 Test Status  Negative   Does the patient have one of the following disease processes/diagnoses(primary or secondary)?  Sepsis   Week 4 attempt successful?  Yes   Call start time  1503   Call end time  1506   Discharge diagnosis  Sepsis, cellulitis, Acute resp. failure with hypoxia, fibrotic lung disease, CKD III   Meds reviewed with patient/caregiver?  Yes   Is the patient having any side effects they believe may be caused by any medication additions or changes?  No   Is the patient taking all medications as directed (includes completed medication regime)?  Yes   Has the patient kept scheduled appointments due by today?  Yes   Comments  Cards tomorrow, chemo Thursday   Is the patient still receiving Home Health Services?  Yes   Pulse Ox monitoring  None   Psychosocial issues?  No   What is the patient's perception of their health status since discharge?  Same   Nursing interventions  Nurse provided patient education   Is the patient/caregiver able to teach back Sepsis?  S - Shivering,fever or very cold, S - Short of breath, P - Pale or discolored skin, E - Extreme pain or generalized discomfort (worst ever,especially abdomen)   Nursing interventions  Nurse provided reassurance to patient   Is patient/caregiver able to teach back steps to recovery at home?  Set small, achievable goals for return to baseline health, Rest and regain strength, Make a list of questions for PCP appoinment, Exercise as tolerated, Eat a balanced diet   Is the patient/caregiver able to teach back signs and symptoms of worsening condition:  Fever, Edema, Shortness of breath/rapid respiratory rate   Is the patient/caregiver able to teach back the hierarchy of who to call/visit for symptoms/problems? PCP, Specialist, Home health nurse, Urgent Care, ED, 911  Yes   Additional teach back comments  swelling during the day,  afebrile, SOA with  dante. Says VS look good at MD appts.   Week 4 call completed?  Yes   Would the patient like one additional call?  No   Graduated  Yes   Did the patient feel the follow up calls were helpful during their recovery period?  Yes   Was the number of calls appropriate?  Yes   Wrap up additional comments  NO issues at this time.          Cecily Jones RN

## 2020-08-25 ENCOUNTER — OFFICE VISIT (OUTPATIENT)
Dept: CARDIOLOGY | Age: 80
End: 2020-08-25
Payer: MEDICARE

## 2020-08-25 VITALS
HEIGHT: 64 IN | DIASTOLIC BLOOD PRESSURE: 72 MMHG | HEART RATE: 82 BPM | BODY MASS INDEX: 28.68 KG/M2 | WEIGHT: 168 LBS | OXYGEN SATURATION: 93 % | SYSTOLIC BLOOD PRESSURE: 122 MMHG

## 2020-08-25 PROCEDURE — 93000 ELECTROCARDIOGRAM COMPLETE: CPT | Performed by: NURSE PRACTITIONER

## 2020-08-25 PROCEDURE — G8427 DOCREV CUR MEDS BY ELIG CLIN: HCPCS | Performed by: NURSE PRACTITIONER

## 2020-08-25 PROCEDURE — 4040F PNEUMOC VAC/ADMIN/RCVD: CPT | Performed by: NURSE PRACTITIONER

## 2020-08-25 PROCEDURE — 1123F ACP DISCUSS/DSCN MKR DOCD: CPT | Performed by: NURSE PRACTITIONER

## 2020-08-25 PROCEDURE — 99214 OFFICE O/P EST MOD 30 MIN: CPT | Performed by: NURSE PRACTITIONER

## 2020-08-25 PROCEDURE — 1036F TOBACCO NON-USER: CPT | Performed by: NURSE PRACTITIONER

## 2020-08-25 PROCEDURE — G8417 CALC BMI ABV UP PARAM F/U: HCPCS | Performed by: NURSE PRACTITIONER

## 2020-08-25 ASSESSMENT — ENCOUNTER SYMPTOMS
SHORTNESS OF BREATH: 1
COUGH: 0
CHEST TIGHTNESS: 0
EYES NEGATIVE: 1
WHEEZING: 0
GASTROINTESTINAL NEGATIVE: 1
SORE THROAT: 0
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
## 2020-08-25 NOTE — PATIENT INSTRUCTIONS
[place] at the [facility] and [address] located on the first floor of [place] [address] through hospital main entrance and turn immediately to your left. Date/Time: Tuesday Sept 1 11:00    Patient's contact number:  [phone] (home)     Echocardiogram -  No prep. Takes approximately 30 min. An echocardiogram uses sound waves to produce images of your heart. This commonly used test allows your doctor to see how your heart is beating and pumping blood. Your doctor can use the images from an echocardiogram to identify various abnormalities in the heart muscle and valves. This test has 2 parts:   Ø You will be asked to disrobe from the waist up and given a gown to wear. The technologist will then hook up an EKG monitor to you for the entire exam.   Ø You will then have an ultrasound of your heart (echocardiogram) to assess the heart muscle, heart valves and heart function. You may eat and take any medicines before the exam.     If you need to change your appointment, please call outpatient scheduling at [phone].

## 2020-08-27 ENCOUNTER — OFFICE VISIT (OUTPATIENT)
Dept: HEMATOLOGY | Age: 80
End: 2020-08-27
Payer: MEDICARE

## 2020-08-27 ENCOUNTER — HOSPITAL ENCOUNTER (OUTPATIENT)
Dept: INFUSION THERAPY | Age: 80
Discharge: HOME | End: 2020-08-27
Payer: MEDICARE

## 2020-08-27 ENCOUNTER — TRANSCRIBE ORDERS (OUTPATIENT)
Dept: ADMINISTRATIVE | Facility: HOSPITAL | Age: 80
End: 2020-08-27

## 2020-08-27 VITALS
BODY MASS INDEX: 26.8 KG/M2 | HEART RATE: 77 BPM | SYSTOLIC BLOOD PRESSURE: 144 MMHG | WEIGHT: 157 LBS | HEIGHT: 64 IN | DIASTOLIC BLOOD PRESSURE: 70 MMHG | TEMPERATURE: 97.5 F | OXYGEN SATURATION: 97 %

## 2020-08-27 DIAGNOSIS — C34.11 MALIGNANT NEOPLASM OF UPPER LOBE, RIGHT BRONCHUS OR LUNG (HCC): ICD-10-CM

## 2020-08-27 DIAGNOSIS — C80.1 MALIGNANT NEOPLASM (HCC): ICD-10-CM

## 2020-08-27 DIAGNOSIS — D63.1 ANEMIA, CHRONIC RENAL FAILURE, STAGE 3 (MODERATE) (HCC): ICD-10-CM

## 2020-08-27 DIAGNOSIS — N18.4 CHRONIC KIDNEY DISEASE, STAGE IV (SEVERE) (HCC): ICD-10-CM

## 2020-08-27 DIAGNOSIS — C34.90 SMALL CELL LUNG CANCER (HCC): Primary | ICD-10-CM

## 2020-08-27 DIAGNOSIS — N18.30 ANEMIA, CHRONIC RENAL FAILURE, STAGE 3 (MODERATE) (HCC): ICD-10-CM

## 2020-08-27 DIAGNOSIS — C34.90 NON-SMALL CELL LUNG CANCER, UNSPECIFIED LATERALITY (HCC): Primary | ICD-10-CM

## 2020-08-27 DIAGNOSIS — D64.9 FATIGUE ASSOCIATED WITH ANEMIA: ICD-10-CM

## 2020-08-27 LAB
ALBUMIN SERPL-MCNC: 3.4 G/DL (ref 3.5–5.2)
ALP BLD-CCNC: 98 U/L (ref 40–130)
ALT SERPL-CCNC: 19 U/L (ref 21–72)
ANION GAP SERPL CALCULATED.3IONS-SCNC: 9 MMOL/L (ref 7–19)
AST SERPL-CCNC: 36 U/L (ref 17–59)
BASOPHILS ABSOLUTE: 0.04 K/UL (ref 0.01–0.08)
BASOPHILS RELATIVE PERCENT: 0.6 % (ref 0.1–1.2)
BILIRUB SERPL-MCNC: 0.3 MG/DL (ref 0.2–1.3)
BUN BLDV-MCNC: 23 MG/DL (ref 9–20)
CALCIUM SERPL-MCNC: 9.8 MG/DL (ref 8.4–10.2)
CHLORIDE BLD-SCNC: 103 MMOL/L (ref 98–111)
CO2: 25 MMOL/L (ref 22–29)
CREAT SERPL-MCNC: 2 MG/DL (ref 0.6–1.2)
EOSINOPHILS ABSOLUTE: 0 K/UL (ref 0.04–0.54)
EOSINOPHILS RELATIVE PERCENT: 0 % (ref 0.7–7)
GFR NON-AFRICAN AMERICAN: 32
GLOBULIN: 3.7 G/DL
GLUCOSE BLD-MCNC: 175 MG/DL (ref 74–106)
HCT VFR BLD CALC: 29.2 % (ref 40.1–51)
HEMOGLOBIN: 9.2 G/DL (ref 13.7–17.5)
LYMPHOCYTES ABSOLUTE: 0.54 K/UL (ref 1.18–3.74)
LYMPHOCYTES RELATIVE PERCENT: 7.4 % (ref 19.3–53.1)
MCH RBC QN AUTO: 35.9 PG (ref 25.7–32.2)
MCHC RBC AUTO-ENTMCNC: 31.5 G/DL (ref 32.3–36.5)
MCV RBC AUTO: 114.1 FL (ref 79–92.2)
MONOCYTES ABSOLUTE: 1.06 K/UL (ref 0.24–0.82)
MONOCYTES RELATIVE PERCENT: 14.6 % (ref 4.7–12.5)
NEUTROPHILS ABSOLUTE: 5.61 K/UL (ref 1.56–6.13)
NEUTROPHILS RELATIVE PERCENT: 77.4 % (ref 34–71.1)
PDW BLD-RTO: 21.1 % (ref 11.6–14.4)
PLATELET # BLD: 259 K/UL (ref 163–337)
PMV BLD AUTO: 10.3 FL (ref 7.4–10.4)
POTASSIUM SERPL-SCNC: 5.1 MMOL/L (ref 3.5–5.1)
RBC # BLD: 2.56 M/UL (ref 4.63–6.08)
SODIUM BLD-SCNC: 137 MMOL/L (ref 137–145)
TOTAL PROTEIN: 7.1 G/DL (ref 6.3–8.2)
TSH SERPL DL<=0.05 MIU/L-ACNC: 0.98 UIU/ML (ref 0.47–4.68)
WBC # BLD: 7.25 K/UL (ref 4.23–9.07)

## 2020-08-27 PROCEDURE — 4040F PNEUMOC VAC/ADMIN/RCVD: CPT | Performed by: INTERNAL MEDICINE

## 2020-08-27 PROCEDURE — 96413 CHEMO IV INFUSION 1 HR: CPT

## 2020-08-27 PROCEDURE — 96372 THER/PROPH/DIAG INJ SC/IM: CPT

## 2020-08-27 PROCEDURE — 1036F TOBACCO NON-USER: CPT | Performed by: INTERNAL MEDICINE

## 2020-08-27 PROCEDURE — 2580000003 HC RX 258: Performed by: INTERNAL MEDICINE

## 2020-08-27 PROCEDURE — 84443 ASSAY THYROID STIM HORMONE: CPT

## 2020-08-27 PROCEDURE — 96417 CHEMO IV INFUS EACH ADDL SEQ: CPT

## 2020-08-27 PROCEDURE — 6360000002 HC RX W HCPCS: Performed by: INTERNAL MEDICINE

## 2020-08-27 PROCEDURE — 1123F ACP DISCUSS/DSCN MKR DOCD: CPT | Performed by: INTERNAL MEDICINE

## 2020-08-27 PROCEDURE — 85025 COMPLETE CBC W/AUTO DIFF WBC: CPT

## 2020-08-27 PROCEDURE — G8427 DOCREV CUR MEDS BY ELIG CLIN: HCPCS | Performed by: INTERNAL MEDICINE

## 2020-08-27 PROCEDURE — 96375 TX/PRO/DX INJ NEW DRUG ADDON: CPT

## 2020-08-27 PROCEDURE — G8417 CALC BMI ABV UP PARAM F/U: HCPCS | Performed by: INTERNAL MEDICINE

## 2020-08-27 PROCEDURE — 80053 COMPREHEN METABOLIC PANEL: CPT

## 2020-08-27 PROCEDURE — 99214 OFFICE O/P EST MOD 30 MIN: CPT | Performed by: INTERNAL MEDICINE

## 2020-08-27 PROCEDURE — 6360000002 HC RX W HCPCS: Performed by: PHYSICIAN ASSISTANT

## 2020-08-27 RX ORDER — DEXAMETHASONE SODIUM PHOSPHATE 10 MG/ML
10 INJECTION, SOLUTION INTRAMUSCULAR; INTRAVENOUS ONCE
Status: COMPLETED | OUTPATIENT
Start: 2020-08-27 | End: 2020-08-27

## 2020-08-27 RX ORDER — SODIUM CHLORIDE 0.9 % (FLUSH) 0.9 %
10 SYRINGE (ML) INJECTION PRN
Status: CANCELLED | OUTPATIENT
Start: 2020-08-27

## 2020-08-27 RX ORDER — CYANOCOBALAMIN 1000 UG/ML
1000 INJECTION INTRAMUSCULAR; SUBCUTANEOUS ONCE
Status: CANCELLED | OUTPATIENT
Start: 2020-08-27

## 2020-08-27 RX ORDER — HEPARIN SODIUM (PORCINE) LOCK FLUSH IV SOLN 100 UNIT/ML 100 UNIT/ML
500 SOLUTION INTRAVENOUS PRN
Status: CANCELLED | OUTPATIENT
Start: 2020-08-27

## 2020-08-27 RX ORDER — DIPHENHYDRAMINE HYDROCHLORIDE 50 MG/ML
50 INJECTION INTRAMUSCULAR; INTRAVENOUS PRN
Status: CANCELLED | OUTPATIENT
Start: 2020-08-27

## 2020-08-27 RX ORDER — SODIUM CHLORIDE 0.9 % (FLUSH) 0.9 %
10 SYRINGE (ML) INJECTION PRN
Status: DISCONTINUED | OUTPATIENT
Start: 2020-08-27 | End: 2020-08-28 | Stop reason: HOSPADM

## 2020-08-27 RX ORDER — PALONOSETRON 0.05 MG/ML
0.25 INJECTION, SOLUTION INTRAVENOUS ONCE
Status: CANCELLED | OUTPATIENT
Start: 2020-08-27

## 2020-08-27 RX ORDER — PALONOSETRON 0.05 MG/ML
0.25 INJECTION, SOLUTION INTRAVENOUS ONCE
Status: COMPLETED | OUTPATIENT
Start: 2020-08-27 | End: 2020-08-27

## 2020-08-27 RX ORDER — CYANOCOBALAMIN 1000 UG/ML
1000 INJECTION INTRAMUSCULAR; SUBCUTANEOUS ONCE
Status: COMPLETED | OUTPATIENT
Start: 2020-08-27 | End: 2020-08-27

## 2020-08-27 RX ORDER — EPINEPHRINE 1 MG/ML
0.3 INJECTION, SOLUTION, CONCENTRATE INTRAVENOUS PRN
Status: CANCELLED | OUTPATIENT
Start: 2020-08-27

## 2020-08-27 RX ORDER — SODIUM CHLORIDE 9 MG/ML
20 INJECTION, SOLUTION INTRAVENOUS ONCE
Status: CANCELLED | OUTPATIENT
Start: 2020-08-27

## 2020-08-27 RX ORDER — METHYLPREDNISOLONE SODIUM SUCCINATE 125 MG/2ML
125 INJECTION, POWDER, LYOPHILIZED, FOR SOLUTION INTRAMUSCULAR; INTRAVENOUS PRN
Status: CANCELLED | OUTPATIENT
Start: 2020-08-27

## 2020-08-27 RX ORDER — SODIUM CHLORIDE 0.9 % (FLUSH) 0.9 %
5 SYRINGE (ML) INJECTION PRN
Status: CANCELLED | OUTPATIENT
Start: 2020-08-27

## 2020-08-27 RX ORDER — HEPARIN SODIUM (PORCINE) LOCK FLUSH IV SOLN 100 UNIT/ML 100 UNIT/ML
500 SOLUTION INTRAVENOUS PRN
Status: DISCONTINUED | OUTPATIENT
Start: 2020-08-27 | End: 2020-08-29 | Stop reason: HOSPADM

## 2020-08-27 RX ADMIN — EPOETIN ALFA-EPBX 20000 UNITS: 10000 INJECTION, SOLUTION INTRAVENOUS; SUBCUTANEOUS at 11:19

## 2020-08-27 RX ADMIN — PALONOSETRON 0.25 MG: 0.05 INJECTION, SOLUTION INTRAVENOUS at 10:12

## 2020-08-27 RX ADMIN — HEPARIN 500 UNITS: 100 SYRINGE at 11:20

## 2020-08-27 RX ADMIN — SODIUM CHLORIDE 200 MG: 9 INJECTION, SOLUTION INTRAVENOUS at 10:32

## 2020-08-27 RX ADMIN — SODIUM CHLORIDE 520 MG: 9 INJECTION, SOLUTION INTRAVENOUS at 11:04

## 2020-08-27 RX ADMIN — SODIUM CHLORIDE, PRESERVATIVE FREE 10 ML: 5 INJECTION INTRAVENOUS at 11:20

## 2020-08-27 RX ADMIN — DEXAMETHASONE SODIUM PHOSPHATE 10 MG: 10 INJECTION, SOLUTION INTRAMUSCULAR; INTRAVENOUS at 10:12

## 2020-08-27 RX ADMIN — CYANOCOBALAMIN 1000 MCG: 1000 INJECTION, SOLUTION INTRAMUSCULAR; SUBCUTANEOUS at 11:19

## 2020-08-27 NOTE — PROGRESS NOTES
Patient:  Linwood Strickland  YOB: 1940  Date of Service: 9/7/2020  MRN: [id number]    Primary Care Physician: Wilfredo Dubose MD    Chief Complaint   Patient presents with    Lung Cancer       Patient Seen, Chart, Consults notes, Labs, Radiology studies reviewed. Subjective:  Linwood Strickland is a 68 y.o.  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 · CKD and chemotherapy associated anemia, on Procrit  · BPH on Flomax  · Orthostatic hypotension medications managed by Dr. Sonia Arevalo    He completed 4 cycles of combination chemotherapy with carboplatin, Keytruda, Alimta.    Sophia is now  receiving  maintenance therapy with Keytruda and Alimta every 3 weeks. He has CKD associated anemia responding to Procrit.     Prabhjot has benign prostatic hypertrophy (BPH)  that is stable on Flomax. Orthostatic hypotension resolved with adjustment of metoprolol by Dr. Sonia Arevalo   He takes Tambocor, metoprolol without new cardiac issues. Protonix continues without any new exacerbations of GERD. Lower extremity edema overall has actually improved some.     Prabhjot returns today for cycle #23 of Keytruda/Alimta. TUMOR HISTORY: Adenocarcinoma on the RUL of the lung 11/27/2018  Layne Ferreira had CT scans performed for new onset of weight loss of 13 pounds over the previous year , associated with increased fatigue. He denied respiratory symptoms of shortness of air, cough or productive sputum.     A CT scan of the chest on 9/12/2018 had been ordered by Dr. Rogerio Spivey due to weight loss and identified a new lobulated right upper lobe 5.7 cm mass that extended back to the right hilum.    Numerous mediastinal lymph nodes ranging in size from mm to 1.3 cm and a subcarnial lymph node that measured 1.5 x 1.5 x 3.5 cm.     A CT scan of the abdomen and pelvis with contrast on 9/12/2018 documented a 4.9 x 4 x 4 cm soft tissue mass with peripheral calcification immediately adjacent to the gallbladder in the head of the pancreas area.     An MRI of the abdomen and pelvis W & WO on 10/11/2018 identified in the 4.1 x 3.4 cm soft tissue mass in the subhepatic space, abutting the second portion of the duodenum with mild displacement of the duodenum. There does not appear to be involvement of the pancreas, and the pancreas appears within normal limits. Differential diagnoses include a desmoid tumor, less likely leiomyoma of the wall of the duodenum or malignancy. Dr. Meryle Sat requested a CT-guided biopsy of the right upper lobe mass. Dr. Angel Lr, the radiologist, evaluated the area with a repeat CT scan of the chest on 10/11/2018. He spoke with Dr. Meryle Sat indicating that he would not be able to perform the biopsy because the tumor was not accessible, it was under the scapula , which blocked access and therefore the biopsy procedure was canceled.     On the CT scan of the chest on 10/11/2018 measurements of the RUL lung mass was 5.7 x 3.2 cm with irregular margins suspicious for a primary lung neoplasm. Soft tissue associated with the tumor appeared to extend into the bronchus supplying this portion of the RUL of the lung. Dr. Angel Lr indicated that the mass had an endobronchial component and should be easily assessable via bronchoscopy.     The chest CT also included a portion of the upper abdomen where a soft tissue mass was described in an addendum report. In the subhepatic space measuring 4.0 x 3.9 cm, displacing the second portion of the duodenum medially. A referral was made to Dr. Tonya Mendez for consideration for bronchoscopy for biopsy.     On 10/24/2018, Dr. Maninder Ramos performed a bronchoscopy with EBUS guided biopsy of a 3 cm subcarinal lymph node along with bronchoalveolar lavage of the posterior segment of the RUL of the lung.   The final pathology of the station 7 lymph node only revealed a background of small mature lymphocytes with clusters of histiocytes suggestive of granuloma. Negative for malignant cells. Kinyoun and GMS stains were negative for AFB and fungal organisms respectively. The bronchial washings were negative for malignant cells as well.      Mr. Keyonna Landry was referred to Dr. Madison Blanco at [facility] in [place], [place], for navigational bronchoscopy and biopsy under ultrasound.     On 11/27/2018 Dr. Madison Blanco performed a bronchoscopy, navigational protocol, endobronchial ultrasound and biopsy of the RUL of the lung mass along with multiple lymph node station Biopsies.   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 · All lymph nodes sampled were NEGATIVE for malignant cells including stations 10L, 4L, station 7, 10R, 4R.   · IHC studies were positive for CK7, TTF-1 and Napsin A.   · IHC studies on tumor cells were negative for CDX2, CK5/6, and p63   · Further lung profile molecular testing is pending     All of the above was discussed at length with Mr. Keyonna Landry at his 12/13/2018 clinic visit. He was agreeable for referral for consideration of resection of the lung lesion.      He is comfortable with and would like a referral to Dr. Hoa Huang ([phone]) at [place] [address], [address] [place] [address], [address] [place] [address]. Logistics, however, are planning a big part in his decision making and he may decide to have surgery done in the [facility] area.     If he decides to have surgery in the [facility], referral will be made to Dr. Vickie Ball.      CT chest with contrast 2/18/2019 at [facility] to 9/12/2018 revealed a 4.9 x 3.5 cm spiculated RUL lung mass which actually decreased in size from a prior value of 6.1 x 3.6 cm.    Subhepatic mass adjacent to the descending duodenum had increased in size significantly to 4.3 x 9 cm compared to 4.1 x 3.4 cm on the 10/11/18 MRI of the abdomen.     CT of the abdomen and pelvis without contrast on 3/20/2019 at [facility] was compared to outpatient CT data and pelvis on 9/12/2018 an MRI of the abdomen from 10/11/2018. A soft tissue mass was again encountered in the subhepatic space which abutted the distal stomach and proximal duodenum measuring 8.9 x 5.4 cm which has increased considerably from a prior measurement of 4.1 x 3.4 cm. Medial to the left renal hilum a 3.5 cm lobular mass causing some mild left-sided hydronephrosis.     Mr. Sharon Dailey was referred to Dr. Kristin Benítez who saw him on 1/22/2019 anticipating plans for a curative resection.      Dr. Caitlin Ibarra received a phone call on 2/20/2019 from Dr. Svetlana Lyle , indicating that the previously documented an abdominal pancreatic area mass had doubled in size and was causing compression into the left kidney/renal pelvis producing a hydroureter.      Dr. Svetlana Lyle arranged a referral to Dr. Vianney Cho who will be placing a stent 3/8/2019.     Mr. Sharon Dailey was scheduled to be seen by gastroenterology at [facility] on 3/13/2019 for EGD/EUS assessment and biopsy of the enlarging peripancreatic/duodenal area mass. With a decrease in the lung mass and increased size of the subhepatic mass-surgical resection was abandoned at present pending further evaluation of the subhepatic mass. Dr. Caitlin Ibarra discussed treatment options with the unresectable lung mass and the per he pancreatic mass and recommended a combination of Keytruda, Alimta, carboplatin. He was subsequently admitted to \[facility]"" 4/26 - 4/30/2019 with abdominal pain and melena. WBC fantasma was 0.75 with ANC 0.34. PLT did drop to 24,000. He did have duodenal ulcerations that were bleeding on endoscopy. He was stabilized but then readmitted from 5/8 - 5/10/2019 with recurrent melanoma. A repeat endoscopy was performed.  It was felt that exacerbation of his bleeding from the duodenal came about by his thrombocytopenia from treatment. Subsequent dosing was adjusted and Neulasta was added.     CT chest without contrast at [facility] on 6/27/2019 was compared to 2/18/2019 revealed that the right lung mass that extended into the right hilum has decreased from 5.2 x 3.5 down to 4.6 x 3.6. There is increased central cavitation in the mass consistent with tumor necrosis. Mediastinal lymphadenopathy is relatively stable.     CT abdomen and pelvis without contrast at [facility] on 6/27/2019 was compared to 4/26/2019 revealed complete resolution of the previously identified. Duodenal/subhepatic space soft tissue mass. The previously identified heterogenous enhancing lesion in the left renal pelvis was much less prominent but there does continue to be some mild left caliectasis. I reviewed the CT results with Dr. Lexa Osborn on 7/5/2019 who then relayed them as well to [facility]. We've had excellent results from this combination of therapy. He will complete the fourth combination therapy of Unique Yepez   and then go to maintenance Keytruda plus Alimta.     CT chest without contrast at [facility] on 1/9/2020 was compared to 10/17/2019 revealing:   · A stable spiculated RUL nodule/mass with similar mediastinal adenopathy. · Questionable right hilar adenopathy with diminished assessment due to lack of IV contrast.    · Advanced emphysema with severe pulmonary fibrosis. · No new pulmonary nodules.     CT abdomen and pelvis without contrast at [facility] on 1/9/2020 was compared to 10/17/2019 revealing:   · Mildly prominent aortocaval RP lymph nodes with position just below the level of the renal vein worrisome for potential lymphatic metastases. This is similar to 10/17/2019 but increased from 4/26/2019. · Pneumobilia without discrete suspicious focal lesion in the liver. · Wall thickening of the duodenum. · Similar and stable renal lesions favoring cysts.     He has had numerous endoscopies within the past year. While he was having an Endo EUS and had cardiac issues and was actually admitted to the intensive care unit. This area will just be monitored on follow-up scans.     TREATMENT SUMMARY  1. Keytruda, carboplatin, Alimta initiated 4/18/2019 to be given every 3 weeks for 4 cycles - 4th cycle 7/5/2019, then Keytruda + Alimta as maintenance to continue indefinitely until progression or intolerable side effects      #2 TUMOR HISTORY: Pancreatic mass diagnosed 10/29/03  Mr. Frederic Mcghee presented in October 2003 with obstructive jaundice. A CT scan of the abdomen on 10/26/2003 documented a 3.2 x 4 x 4.2 cm mass in the head of the pancreas.      On 10/29/03 Dr. Gloria Quevedo performed a resection of a portion of the head of the pancreas on [facility] along with a Mitul-en-Y procedure and a choledochojejunostomy for what was felt to be a pancreatic cancer. His pancreas was bypassed because of the findings. Pathology of the biopsies were negative for malignancy showing a fibrosed pancreas. Mr. Frederic Mcghee was referred to Dr. Haley Bello in [place].     Dr. Haley Bello performed ERCP with an EUS and biopsy on 02/26/04   Pathology again was negative for cancer or malignancy. Routine periodic serologic and radiographic monitoring has not disclosed progression of the mass or development of new malignancy or increase in pancreatic tumor markers.      A CT scan of the abdomen and pelvis with contrast on 9/12/2018 documented a 4.9 x 4 x 4 cm soft tissue mass with peripheral calcification immediately adjacent to the gallbladder in the head of the pancreas area.     An MRI of the abdomen and pelvis W & WO on 10/11/2018 identified in the 4.1 x 3.4 cm soft tissue mass in the subhepatic space, abutting the second portion of the duodenum with mild displacement of the duodenum. There does not appear to be involvement of the pancreas, and the pancreas appears within normal limits.   Differential diagnoses include a desmoid tumor, less likely leiomyoma of the wall of the duodenum or malignancy.     CT of the abdomen and pelvis without contrast on 3/20/2019 at [facility] was compared to outpatient CT data and pelvis on 9/12/2018 an MRI of the abdomen from 10/11/2018. A soft tissue mass was again encountered in the subhepatic space which abutted the distal stomach and proximal duodenum measuring 8.9 x 5.4 cm which has increased considerably from a prior measurement of 4.1 x 3.4 cm. Medial to the left renal hilum a 3.5 cm lobular mass causing some mild left-sided hydronephrosis.     Mr. Reggie Garcia was scheduled for an EGD/EUS by Dr. Azar Veloz as an outpatient procedure on 3/13/2019 for assessment and biopsy of the enlarging peripancreatic/duodenal area mass. Unfortunately, after initiation of the procedure, he began having ventricular tachycardia and the procedure had to be aborted. Lata Campbell was transferred to the ICU for cardiology evaluation and stabilization. He remained hospitalized until 3/18/2019 when he was medically cleared for discharge.     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 No emergent urologic intervention was warranted and he received monitoring of renal function. He had a creatinine level of 1.9 at discharge.     PET scan on 4/2/2019 identified a soft tissue mass in the RUL measuring 1.2 x 3.5 cm with extension to the right anterior hilum with an SUV of 10.1. Evidence of mediastinal and hilar lymphadenopathy, with low-level metabolic activity. Interval increase in the size of a large mass in the right upper abdomen inseparable from the duodenum measuring 10.7 x 6.9 cm compared to 5.4 x 8.9 cm on 2/20/2019 with an SUV of 23.6. Slight increase in 2 small inseparable masses medial to the hilum of the left kidney measuring 2.2 and 2.6 cm and the other measuring 3.9 cm with a maximum SUV of 18. 6.     Cycle #1 of palliative chemotherapy with Carboplatin, Alimta and Arleen Bis was initiated 4/18/19 which should also cover this process.     Abdominal/pelvic CT 4/26/19 was performed at [facility] due to abdominal pain and melena. The RUQ mass, within the duodenum decreased to 6.8 x 6.2 cm (was 10.7 x 6.9 cm on PET 4/2/19)     CT abdomen and pelvis without contrast at [facility] on 6/27/2019 was compared to 4/26/2019 revealed complete resolution of the previously identified. Duodenal/subhepatic space soft tissue mass. The previously identified heterogenous enhancing lesion in the left renal pelvis was much less prominent but there does continue to be some mild left caliectasis.     CT chest without contrast at [facility] on 1/9/2020 was compared to 10/17/2019 revealing:   · A stable spiculated RUL nodule/mass with similar mediastinal adenopathy. · Questionable right hilar adenopathy with diminished assessment due to lack of IV contrast.    · Advanced emphysema with severe pulmonary fibrosis. · No new pulmonary nodules.     CT abdomen and pelvis without contrast at [facility] on 1/9/2020 was compared to 10/17/2019 revealing:   · Mildly prominent aortocaval RP lymph nodes with position just below the level of the renal vein worrisome for potential lymphatic metastases. This is similar to 10/17/2019 but increased from 4/26/2019. · Pneumobilia without discrete suspicious focal lesion in the liver. · Wall thickening of the duodenum. · Similar and stable renal lesions favoring cysts.         He has had numerous endoscopies within the past year. About 10 months ago he was having an Endo EUS and had cardiac issues and was actually admitted to the intensive care unit. This area will just be monitored on follow-up scans.           #1 HEMATOLOGICAL HISTORY: IgG kappa MGUS- Dx: 02/23/06.   During the course of Mr. Dru Palacios follow up, an abnormally elevated protein was noted on one occasion, which led to workup including quantitative immunoglobulins.      An elevated IgG kappa was encountered. This has remained stable in the 2500 range since early 2006.      A bone marrow biopsy and aspirate on 02/23/06 was negative with only 4% plasma cells.      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      24 hour urine for immunoelectrophoresis did not reveal an M-spike. Serology studies on 9/18/2018 documented an elevated, stable IgG of 2589 with an M spike of 0.7. Immunofixation continued to reveal IgG monoclonal protein with kappa light chain specificity.     #2 HEMATOLOGICAL HISTORY: Iron deficiency anemia, 9/18/2018  Tyrell Connolly had serology on 9/18/2018 that identified iron deficiency anemia. His lab work was obtained at [facility].  An iron level was 12, iron saturation 7%, ferritin 256  Folate >20, and vitamin B12 1044. Dr. Ronni Peterson placed Sophia on ferrous sulfate 325 mg daily on 9/25/2018 , but this failed to resolve the anemia.     An EGD by Dr. Ivan Turner on 12/11/2018 documented a normal esophagus, normal stomach and a degree of duodenal deformity. Gastric biopsy was urease negative.     Colonoscopy by Dr. Ivan Turner on 1/9/2019 documented a polyp at 80 cm. Pathology identified a tubular adenoma, negative for high-grade dysplasia. Feraheme administered.     Labs on 3/13/2019:  · Iron 25   · Iron saturation 22%   · TIBC 116   · Ferritin >2000   · Reticulocyte count 0.0578   ·    · Haptoglobin 341   · Folate >20   · Vitamin B12 945   · Erythropoietin 13     He presented to [facility] on 4/26/2019 with melena and abdominal discomfort. He was subsequently admitted     EGD per Dr. Pieter Uriarte on 4/29/2019 revealed  · LA grade (1 or more mucosal breaks greater than 5 mm, not extending between the tops of the 2 mucosal folds)? esophagitis with no bleeding found in the distal esophagus   · Stomach was normal, biopsies for H. pylori taken.    · Cardia and gastric fundus were normal on retroflexion   · One nonbleeding cratered duodenal ulcer with no stigmata of bleeding was found in the duodenal bulb lesion was about 9 mm. · Many nonbleeding cratered, linear and superficial duodenal ulcers with no stigmata of bleeding were found in the second portion of the duodenum. The largest lesion was 6 mm in largest dimension. No mass encountered and anastomosis not seen.     He was admitted to [facility] on 5/8/2019 with melena, hematochezia, anemia, thrombocytopenia     Endoscopy performed by Dr. Helen Hammans on 5/9/2019 revealed  · Normal esophagus   · Gastric mucosal variant. 2 erythematous spots in the antrum, ? AVM   · Normal examined duodenum   · Nothing found to account for symptoms   He developed pancytopenia from chemotherapy and did require transfusions. His hemoglobin dropped to 7.3 on 6/25/2019 after his last treatment. He received 2 units packed red blood cells as an outpatient.     Serology 6/13/2019  Serum Fe - 117  TIBC - 587  Fe sat - 19.9%  Ferritin - 1,000  Iron levels have been adequately replaced. 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     TREATMENT SUMMARY  1. Feraheme 510 mg IV on 2/14 and 2/21/19   2.  Venofer 200 mg IV daily 5/8 - 5/10/2019 as IP at [facility]   3. s/p 2 units pRBC on 4/26/19 and 2 units pRBC on 4/29/2019 as IP at [facility]?  s/p 2 units pRBC on?5/8/2019   s/p 2 pheresis plts on 5/8/2019  s/p 2 units pRBC as OP 6/26/2019    Allergies:  Penicillins    Medicines:  Current Outpatient Medications   Medication Sig Dispense Refill    dexamethasone (DECADRON) 4 MG tablet TAKE 1 TABLET BY MOUTH TWICE DAILY DAY  BEFORE  CHEMO,  THE  DAY  OF  CHEMO  AND  THE  DAY  AFTER  --EVERY  21  DAYS 24 tablet 0    folic acid (FOLVITE) 1 MG tablet Take 1 tablet by mouth once daily 30 tablet 5    senna-docusate (SENOKOT S) 8.6-50 MG per tablet Take 2 tablets by mouth 2 times daily 120 tablet 1    spironolactone (ALDACTONE) 25 MG tablet Take 25 mg by mouth daily      flecainide (TAMBOCOR) 50 MG tablet Take 50 mg by mouth 2 times daily      sodium bicarbonate 650 MG tablet Take 650 mg by mouth 2 times daily      pantoprazole (PROTONIX) 40 MG tablet Take 40 mg by mouth daily      tamsulosin (FLOMAX) 0.4 MG capsule Take 0.4 mg by mouth daily      metoprolol tartrate (LOPRESSOR) 25 MG tablet Take 0.5 tablets by mouth 2 times daily (Patient taking differently: Take 12.5 mg by mouth daily ) 60 tablet 0    Multiple Vitamin (MULTI VITAMIN DAILY PO) Take by mouth daily        No current facility-administered medications for this visit. Past Medical History:      Diagnosis Date    Abnormal weight loss     Anemia     Anemia, unspecified     Blind left eye     Cardiomyopathy (HCC)     with compensated CHF    Cellulitis     Cellulitis of unspecified part of limb     Chronic kidney disease, stage IV (severe) (HCC)     Dr. Lety Cotter COPD (chronic obstructive pulmonary disease) ([facility]     Duodenal ulcer     Essential hypertension 10/2/2017    Eye injury     at age 12 from penetrating injury    Gout     HTN (hypertension)     Hydronephrosis     Hydronephrosis, left     Liver abscess     resolved    Lung nodule     Malignant neoplasm ([address]     Malignant neoplasm of upper lobe, right bronchus or lung (HCC)     MGUS (monoclonal gammopathy of unknown significance)     secondary to an IgG kappa.   IgG level in 2,000 range    Monoclonal gammopathy     NICM (nonischemic cardiomyopathy) (HCC)     Non-small cell lung cancer ([address] 6/4/2019    Non-sustained ventricular tachycardia (HCC)     NSVT (nonsustained ventricular tachycardia) (HCC)     Osteoarthritis     Other fatigue     Other iron deficiency anemias     Secondary malignant neoplasm of other digestive organs ([address]     Weight loss         Past Surgical History:      Procedure Laterality Date    BRONCHOSCOPY  11/27/2018    dx of adenocarcinoma RUL  Dr. Megha Bowers CARDIAC CATH LAB PROCEDURE      ENDOSCOPY, COLON, DIAGNOSTIC  2019    aborted d/t vtach    EYE SURGERY Left     EYE SURGERY  1964    FOOT SURGERY      removal of joint from right toe from drilling accident, [address]  10/29/2003    with resection  Mitul-en-Y proecedure  DR. Mcfarlane    TUNNELED VENOUS PORT PLACEMENT      UPPER GASTROINTESTINAL ENDOSCOPY N/A 3/13/2019    EGD W/EUS FNA performed by Yolanda Love MD at [address] [facility]        Family History:      Problem Relation Age of Onset    Osteoporosis Mother     High Blood Pressure Mother     Asthma Mother     Bleeding Prob Father     Cancer Father         leukemia    Asthma Brother         Social History  Social History     Tobacco Use    Smoking status: Former Smoker     Packs/day: 2.00     Years: 40.00     Pack years: 80.00     Types: Cigarettes     Last attempt to quit: 10/29/2003     Years since quittin.8    Smokeless tobacco: Never Used   Substance Use Topics    Alcohol use: No    Drug use: No          Review of Systems:  Constitutional: Negative for chills, fatigue, fever or significant weight loss. HENT: Negative for congestion, hearing loss, nosebleeds or sore throat. Eyes: Negative for photophobia, pain, discharge, redness and visual disturbance. Respiratory: Negative for cough, shortness of breath, or wheezing. Cardiovascular: Negative for chest pain, palpitations or leg swelling. Gastrointestinal: Negative for abdominal pain, blood in stool, constipation, diarrhea, nausea or vomiting. Genitourinary: Negative for dysuria, flank pain, frequency, hematuria or urgency. Musculoskeletal: Negative for back pain, joint swelling, myalgias or neck pain. Skin: Negative for rash or petechiae. Neurological: Negative for tremors, seizures, syncope, weakness or headaches. Hematological: No active bruising or bleeding. Psychiatric/Behavioral: Negative for hallucinations.            Objective:  Vital Signs: Blood pressure (!) 144/70, pulse 79, temperature 97.5 °F (36.4 °C), height 5' 4\" (1.626 m), weight 157 lb (71.2 kg), SpO2 97 %. Physical Exam   Constitutional: Oriented to person, place, and time. No acute distress. Head: Normocephalic and atraumatic. Nose: Nose normal.   Mouth/Throat: Oropharynx is clear and moist. No oropharyngeal exudate. Eyes: Pupils are equal and round. Conjunctivae and EOM are normal. No scleral icterus. Neck: Normal range of motion. Neck supple. No JVD. No appreciable thyromegaly. Cardiovascular: Normal rate, regular rhythm, normal heart sounds and intact distal pulses. Exam reveals no gallop, murmurs or friction rub. Pulmonary/Chest: Effort normal and breath sounds normal. No respiratory distress. No wheezes. Abdominal: Soft. Bowel sounds are normal. No organomegally or masses. No tenderness. There is no rebound and no guarding. Musculoskeletal: Normal range of motion. No edema or tenderness. Lymphadenopathy: No cervical, axillary or inguinal lymphadenopathy. Neurological: Alert and oriented to person, place, and time. Cranial nerves are intact. Neurological exam is nonfocal  Skin: Skin is warm and dry. No rash noted. No erythema. No pallor. Psychiatric: Judgment normal.     Labs:  BMP: No results for input(s): NA, K, CL, CO2, PHOS, BUN, CREATININE, CALCIUM in the last 72 hours. CBC:   No results for input(s): WBC, HGB, HCT, MCV, PLT in the last 72 hours. LIVER PROFILE: No results for input(s): AST, ALT, LIPASE, BILIDIR, BILITOT, ALKPHOS in the last 72 hours. Invalid input(s): AMYLASE,  ALB  PT/INR: No results for input(s): PROTIME, INR in the last 72 hours. APTT: No results for input(s): APTT in the last 72 hours. BNP:  No results for input(s): BNP in the last 72 hours. Ionized Calcium:No results for input(s): IONCA in the last 72 hours. Magnesium:No results for input(s): MG in the last 72 hours.   Phosphorus:No results for input(s): PHOS in the last 72 hours. HgbA1C: No results for input(s): LABA1C in the last 72 hours. Hepatic: No results for input(s): ALKPHOS, ALT, AST, PROT, BILITOT, BILIDIR, LABALBU in the last 72 hours. Lactic Acid: No results for input(s): LACTA in the last 72 hours. Troponin: No results for input(s): CKTOTAL, CKMB, TROPONINT in the last 72 hours. ABGs: No results for input(s): PH, PCO2, PO2, HCO3, O2SAT in the last 72 hours. CRP:  No results for input(s): CRP in the last 72 hours. Sed Rate:  No results for input(s): SEDRATE in the last 72 hours. Cultures:   No results for input(s): CULTURE in the last 72 hours. Radiology reports as per the Radiologist  Radiology: No results found. ASSESSMENT AND PLAN:  #1.    Yemi Watters is a 68 y.o.  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 · CKD and chemotherapy associated anemia, on Procrit  · BPH on Flomax  · Orthostatic hypotension medications managed by Dr. Castellanos Other    He completed 4 cycles of combination chemotherapy with carboplatin, Keytruda, Alimta.    Sophia is now  receiving  maintenance therapy with Keytruda and Alimta every 3 weeks. He has CKD associated anemia responding to Procrit.     Prabhjot has benign prostatic hypertrophy (BPH)  that is stable on Flomax. Orthostatic hypotension resolved with adjustment of metoprolol by Dr. Castellanos Other   He takes Tambocor, metoprolol without new cardiac issues. Protonix continues without any new exacerbations of GERD. Lower extremity edema overall has actually improved some.     Prabhjot returns today for cycle #23 of Keytruda/Alimta. Physical examination does not reveal evidence of palpable lymphadenopathy in the supraclavicular infraclavicular or axillary areas. Lungs are relatively clear heart is regular abdomen is soft and benign. CBC 7/9/2020 revealed a WBC of 7.89.  Hgb is 10 with an MCV of 112.6 and platelet count of 169,113. CBC today (8/27/2020) reveals a WBC of 7.25. Hgb is 9.2 with an MCV of 114.1 and platelet count of 118,751. Serology on 6/18/2020 revealed:  CMP with glucose 171, BUN 28, creatinine 2.1  TSH- 1.010    Serology on 7/9/2020 revealed:  CMP with glucose 184, BUN 26, creatinine 2.1, albumin 3.4, ALT 19  TSH- 1.180    Serology on 8/6/2020 revealed:  CMP with sodium 135, glucose 126, BUN 27, creatinine 2.2, ALT 15  TSH- 1.670    CXR on 6/5/25020 documented severe chronic lung changes. Cycle #21 of Patti Sellers will be delivered today. Repeat scanning will be done prior to his return in 3 weeks to evaluate response. Repeat serology will also be performed today. CT abdomen and pelvis without contrast on 7/16/2020 at [facility] was compared to 1/9/2020 and documented:  · 1.5 x 0.9 cm aortocaval lymph node, previously 1.9 x 1.2 cm  · No new abnormality seen     Treatment will continue today as scheduled. Follow-up CT scan will be repeated in 2 months    #2   Anemia of chronic renal failure and chemotherapy associated anemia     Hemoglobin is  10.8   Procrit 20,000 units weekly given as indicated to decrease transfusion requirements.         Dimple HINSON LPN am scribing for Kassie Reeder MD. Electronically signed by Mukesh Mckeon LPN on 2/4/1969 at 3:20 PM       Rosana Triplett was seen today for lung cancer. Diagnoses and all orders for this visit:    Non-small cell lung cancer, unspecified laterality ([address]  -     Comprehensive Metabolic Panel; Future  -     TSH without Reflex; Future  -     CT CHEST WO CONTRAST; Future  -     CT ABDOMEN PELVIS WO CONTRAST Additional Contrast? Radiologist Recommendation; Future    Fatigue associated with anemia  -     Comprehensive Metabolic Panel; Future  -     TSH without Reflex; Future    Secondary malignant neoplasm of other digestive organs ([address]  -     CT CHEST WO CONTRAST;  Future  -     CT ABDOMEN PELVIS WO CONTRAST Additional Contrast? Radiologist Recommendation; Future        Orders Placed This Encounter   Procedures    CT CHEST WO CONTRAST     Standing Status:   Future     Standing Expiration Date:   8/27/2021     Order Specific Question:   Reason for exam:     Answer:   evaluate response to treatment, non small cell lung cancer    CT ABDOMEN PELVIS WO CONTRAST Additional Contrast? Radiologist Recommendation     Standing Status:   Future     Standing Expiration Date:   8/27/2021     Order Specific Question:   Additional Contrast?     Answer:   Radiologist Recommendation     Order Specific Question:   Reason for exam:     Answer:   evaluate response to treatment, non small cell lung cance    Comprehensive Metabolic Panel     Standing Status:   Future     Number of Occurrences:   1     Standing Expiration Date:   8/27/2021    TSH without Reflex     Standing Status:   Future     Number of Occurrences:   1     Standing Expiration Date:   8/27/2021       No orders of the defined types were placed in this encounter. Return in about 9 weeks (around 10/29/2020) for treatment & see Dr. Carmen Gutierres. I, Dr. Pavithra Puga, personally performed the services described in this documentation as scribed by [facility] LPN in my presence, and it is both accurate and complete.

## 2020-08-31 RX ORDER — DEXAMETHASONE 4 MG/1
TABLET ORAL
Qty: 24 TABLET | Refills: 0 | Status: SHIPPED | OUTPATIENT
Start: 2020-08-31 | End: 2021-03-24

## 2020-09-01 ENCOUNTER — HOSPITAL ENCOUNTER (OUTPATIENT)
Dept: NON INVASIVE DIAGNOSTICS | Age: 80
Discharge: HOME | End: 2020-09-01
Payer: MEDICARE

## 2020-09-01 LAB
LV EF: 58 %
LVEF MODALITY: NORMAL

## 2020-09-01 PROCEDURE — 93306 TTE W/DOPPLER COMPLETE: CPT

## 2020-09-03 ENCOUNTER — HOSPITAL ENCOUNTER (OUTPATIENT)
Dept: INFUSION THERAPY | Age: 80
Discharge: HOME | End: 2020-09-03
Payer: MEDICARE

## 2020-09-03 VITALS
TEMPERATURE: 98 F | SYSTOLIC BLOOD PRESSURE: 118 MMHG | OXYGEN SATURATION: 90 % | DIASTOLIC BLOOD PRESSURE: 68 MMHG | HEART RATE: 102 BPM | HEIGHT: 64 IN | WEIGHT: 156 LBS | BODY MASS INDEX: 26.63 KG/M2

## 2020-09-03 DIAGNOSIS — C34.90 NON-SMALL CELL LUNG CANCER, UNSPECIFIED LATERALITY (HCC): Primary | ICD-10-CM

## 2020-09-03 DIAGNOSIS — N18.30 ANEMIA, CHRONIC RENAL FAILURE, STAGE 3 (MODERATE) (HCC): ICD-10-CM

## 2020-09-03 DIAGNOSIS — D63.1 ANEMIA, CHRONIC RENAL FAILURE, STAGE 3 (MODERATE) (HCC): ICD-10-CM

## 2020-09-03 DIAGNOSIS — C80.1 MALIGNANT NEOPLASM (HCC): ICD-10-CM

## 2020-09-03 DIAGNOSIS — N18.4 CHRONIC KIDNEY DISEASE, STAGE IV (SEVERE) (HCC): ICD-10-CM

## 2020-09-03 LAB
BASOPHILS ABSOLUTE: 0.01 K/UL (ref 0.01–0.08)
BASOPHILS RELATIVE PERCENT: 0.4 % (ref 0.1–1.2)
EOSINOPHILS ABSOLUTE: 0.06 K/UL (ref 0.04–0.54)
EOSINOPHILS RELATIVE PERCENT: 2.5 % (ref 0.7–7)
HCT VFR BLD CALC: 29.9 % (ref 40.1–51)
HEMOGLOBIN: 9.1 G/DL (ref 13.7–17.5)
LYMPHOCYTES ABSOLUTE: 0.58 K/UL (ref 1.18–3.74)
LYMPHOCYTES RELATIVE PERCENT: 24.4 % (ref 19.3–53.1)
MCH RBC QN AUTO: 37.4 PG (ref 25.7–32.2)
MCHC RBC AUTO-ENTMCNC: 30.4 G/DL (ref 32.3–36.5)
MCV RBC AUTO: 123 FL (ref 79–92.2)
MONOCYTES ABSOLUTE: 0.1 K/UL (ref 0.24–0.82)
MONOCYTES RELATIVE PERCENT: 4.2 % (ref 4.7–12.5)
NEUTROPHILS ABSOLUTE: 1.63 K/UL (ref 1.56–6.13)
NEUTROPHILS RELATIVE PERCENT: 68.5 % (ref 34–71.1)
PDW BLD-RTO: 20 % (ref 11.6–14.4)
PLATELET # BLD: 144 K/UL (ref 163–337)
PMV BLD AUTO: 10.7 FL (ref 7.4–10.4)
RBC # BLD: 2.43 M/UL (ref 4.63–6.08)
WBC # BLD: 2.38 K/UL (ref 4.23–9.07)

## 2020-09-03 PROCEDURE — 85025 COMPLETE CBC W/AUTO DIFF WBC: CPT

## 2020-09-03 PROCEDURE — 6360000002 HC RX W HCPCS: Performed by: PHYSICIAN ASSISTANT

## 2020-09-03 PROCEDURE — 96372 THER/PROPH/DIAG INJ SC/IM: CPT

## 2020-09-03 RX ADMIN — EPOETIN ALFA-EPBX 20000 UNITS: 10000 INJECTION, SOLUTION INTRAVENOUS; SUBCUTANEOUS at 13:55

## 2020-09-08 RX ORDER — DOCUSATE SODIUM AND SENNOSIDES 50; 8.6 MG/1; MG/1
TABLET ORAL
Qty: 120 TABLET | Refills: 0 | Status: SHIPPED | OUTPATIENT
Start: 2020-09-08 | End: 2021-03-24

## 2020-09-10 ENCOUNTER — HOSPITAL ENCOUNTER (OUTPATIENT)
Dept: INFUSION THERAPY | Age: 80
Discharge: HOME | End: 2020-09-10
Payer: MEDICARE

## 2020-09-10 VITALS
HEART RATE: 89 BPM | TEMPERATURE: 98.2 F | DIASTOLIC BLOOD PRESSURE: 62 MMHG | BODY MASS INDEX: 26.27 KG/M2 | WEIGHT: 153.9 LBS | HEIGHT: 64 IN | OXYGEN SATURATION: 93 % | SYSTOLIC BLOOD PRESSURE: 120 MMHG

## 2020-09-10 DIAGNOSIS — C80.1 MALIGNANT NEOPLASM (HCC): ICD-10-CM

## 2020-09-10 DIAGNOSIS — D63.1 ANEMIA, CHRONIC RENAL FAILURE, STAGE 3 (MODERATE) (HCC): ICD-10-CM

## 2020-09-10 DIAGNOSIS — N18.30 ANEMIA, CHRONIC RENAL FAILURE, STAGE 3 (MODERATE) (HCC): ICD-10-CM

## 2020-09-10 DIAGNOSIS — N18.4 CHRONIC KIDNEY DISEASE, STAGE IV (SEVERE) (HCC): ICD-10-CM

## 2020-09-10 DIAGNOSIS — C34.90 NON-SMALL CELL LUNG CANCER, UNSPECIFIED LATERALITY (HCC): Primary | ICD-10-CM

## 2020-09-10 PROCEDURE — 96372 THER/PROPH/DIAG INJ SC/IM: CPT

## 2020-09-10 PROCEDURE — 85025 COMPLETE CBC W/AUTO DIFF WBC: CPT

## 2020-09-10 PROCEDURE — 6360000002 HC RX W HCPCS: Performed by: PHYSICIAN ASSISTANT

## 2020-09-10 RX ADMIN — EPOETIN ALFA-EPBX 20000 UNITS: 10000 INJECTION, SOLUTION INTRAVENOUS; SUBCUTANEOUS at 14:45

## 2020-09-11 LAB
BASOPHILS ABSOLUTE: 0.01 K/UL (ref 0.01–0.08)
BASOPHILS RELATIVE PERCENT: 0.3 % (ref 0.1–1.2)
EOSINOPHILS ABSOLUTE: 0.04 K/UL (ref 0.04–0.54)
EOSINOPHILS RELATIVE PERCENT: 1.3 % (ref 0.7–7)
HCT VFR BLD CALC: 28.4 % (ref 40.1–51)
HEMOGLOBIN: 8.6 G/DL (ref 13.7–17.5)
LYMPHOCYTES ABSOLUTE: 0.88 K/UL (ref 1.18–3.74)
LYMPHOCYTES RELATIVE PERCENT: 28.4 % (ref 19.3–53.1)
MCH RBC QN AUTO: 36.9 PG (ref 25.7–32.2)
MCHC RBC AUTO-ENTMCNC: 30.3 G/DL (ref 32.3–36.5)
MCV RBC AUTO: 121.9 FL (ref 79–92.2)
MONOCYTES ABSOLUTE: 0.59 K/UL (ref 0.24–0.82)
MONOCYTES RELATIVE PERCENT: 19 % (ref 4.7–12.5)
NEUTROPHILS ABSOLUTE: 1.58 K/UL (ref 1.56–6.13)
NEUTROPHILS RELATIVE PERCENT: 51 % (ref 34–71.1)
PDW BLD-RTO: 19.3 % (ref 11.6–14.4)
PLATELET # BLD: 53 K/UL (ref 163–337)
PMV BLD AUTO: 10.9 FL (ref 7.4–10.4)
RBC # BLD: 2.33 M/UL (ref 4.63–6.08)
WBC # BLD: 3.1 K/UL (ref 4.23–9.07)

## 2020-09-16 RX ORDER — METHYLPREDNISOLONE SODIUM SUCCINATE 125 MG/2ML
125 INJECTION, POWDER, LYOPHILIZED, FOR SOLUTION INTRAMUSCULAR; INTRAVENOUS PRN
Status: CANCELLED | OUTPATIENT
Start: 2020-09-17

## 2020-09-16 RX ORDER — SODIUM CHLORIDE 0.9 % (FLUSH) 0.9 %
10 SYRINGE (ML) INJECTION PRN
Status: CANCELLED | OUTPATIENT
Start: 2020-09-17

## 2020-09-16 RX ORDER — SODIUM CHLORIDE 9 MG/ML
20 INJECTION, SOLUTION INTRAVENOUS ONCE
Status: CANCELLED | OUTPATIENT
Start: 2020-09-17

## 2020-09-16 RX ORDER — CYANOCOBALAMIN 1000 UG/ML
1000 INJECTION INTRAMUSCULAR; SUBCUTANEOUS ONCE
Status: CANCELLED | OUTPATIENT
Start: 2020-09-17

## 2020-09-16 RX ORDER — EPINEPHRINE 1 MG/ML
0.3 INJECTION, SOLUTION, CONCENTRATE INTRAVENOUS PRN
Status: CANCELLED | OUTPATIENT
Start: 2020-09-17

## 2020-09-16 RX ORDER — PALONOSETRON 0.05 MG/ML
0.25 INJECTION, SOLUTION INTRAVENOUS ONCE
Status: CANCELLED | OUTPATIENT
Start: 2020-09-17

## 2020-09-16 RX ORDER — HEPARIN SODIUM (PORCINE) LOCK FLUSH IV SOLN 100 UNIT/ML 100 UNIT/ML
500 SOLUTION INTRAVENOUS PRN
Status: CANCELLED | OUTPATIENT
Start: 2020-09-17

## 2020-09-16 RX ORDER — DIPHENHYDRAMINE HYDROCHLORIDE 50 MG/ML
50 INJECTION INTRAMUSCULAR; INTRAVENOUS PRN
Status: CANCELLED | OUTPATIENT
Start: 2020-09-17

## 2020-09-16 RX ORDER — SODIUM CHLORIDE 0.9 % (FLUSH) 0.9 %
5 SYRINGE (ML) INJECTION PRN
Status: CANCELLED | OUTPATIENT
Start: 2020-09-17

## 2020-09-17 ENCOUNTER — HOSPITAL ENCOUNTER (OUTPATIENT)
Dept: INFUSION THERAPY | Age: 80
Discharge: HOME | End: 2020-09-17
Payer: MEDICARE

## 2020-09-17 VITALS
BODY MASS INDEX: 26.46 KG/M2 | HEART RATE: 78 BPM | WEIGHT: 155 LBS | TEMPERATURE: 98 F | HEIGHT: 64 IN | SYSTOLIC BLOOD PRESSURE: 120 MMHG | OXYGEN SATURATION: 93 % | DIASTOLIC BLOOD PRESSURE: 75 MMHG

## 2020-09-17 DIAGNOSIS — N18.30 ANEMIA, CHRONIC RENAL FAILURE, STAGE 3 (MODERATE) (HCC): ICD-10-CM

## 2020-09-17 DIAGNOSIS — N18.4 CHRONIC KIDNEY DISEASE, STAGE IV (SEVERE) (HCC): ICD-10-CM

## 2020-09-17 DIAGNOSIS — C34.90 NON-SMALL CELL LUNG CANCER, UNSPECIFIED LATERALITY (HCC): Primary | ICD-10-CM

## 2020-09-17 DIAGNOSIS — C34.11 MALIGNANT NEOPLASM OF UPPER LOBE, RIGHT BRONCHUS OR LUNG (HCC): ICD-10-CM

## 2020-09-17 DIAGNOSIS — D63.1 ANEMIA, CHRONIC RENAL FAILURE, STAGE 3 (MODERATE) (HCC): ICD-10-CM

## 2020-09-17 DIAGNOSIS — C80.1 MALIGNANT NEOPLASM (HCC): ICD-10-CM

## 2020-09-17 LAB
ALBUMIN SERPL-MCNC: 3.1 G/DL (ref 3.5–5.2)
ALP BLD-CCNC: 115 U/L (ref 40–130)
ALT SERPL-CCNC: 18 U/L (ref 21–72)
ANION GAP SERPL CALCULATED.3IONS-SCNC: 8 MMOL/L (ref 7–19)
AST SERPL-CCNC: 44 U/L (ref 17–59)
BASOPHILS ABSOLUTE: 0.08 K/UL (ref 0.01–0.08)
BASOPHILS RELATIVE PERCENT: 1.1 % (ref 0.1–1.2)
BILIRUB SERPL-MCNC: 0.3 MG/DL (ref 0.2–1.3)
BUN BLDV-MCNC: 23 MG/DL (ref 9–20)
CALCIUM SERPL-MCNC: 9.5 MG/DL (ref 8.4–10.2)
CHLORIDE BLD-SCNC: 103 MMOL/L (ref 98–111)
CO2: 26 MMOL/L (ref 22–29)
CREAT SERPL-MCNC: 1.9 MG/DL (ref 0.6–1.2)
EOSINOPHILS ABSOLUTE: 0.02 K/UL (ref 0.04–0.54)
EOSINOPHILS RELATIVE PERCENT: 0.3 % (ref 0.7–7)
GFR NON-AFRICAN AMERICAN: 34
GLOBULIN: 3.8 G/DL
GLUCOSE BLD-MCNC: 174 MG/DL (ref 74–106)
HCT VFR BLD CALC: 27.6 % (ref 40.1–51)
HEMOGLOBIN: 8.8 G/DL (ref 13.7–17.5)
LYMPHOCYTES ABSOLUTE: 0.67 K/UL (ref 1.18–3.74)
LYMPHOCYTES RELATIVE PERCENT: 8.9 % (ref 19.3–53.1)
MCH RBC QN AUTO: 37.1 PG (ref 25.7–32.2)
MCHC RBC AUTO-ENTMCNC: 31.9 G/DL (ref 32.3–36.5)
MCV RBC AUTO: 116.5 FL (ref 79–92.2)
MONOCYTES ABSOLUTE: 1.26 K/UL (ref 0.24–0.82)
MONOCYTES RELATIVE PERCENT: 16.8 % (ref 4.7–12.5)
NEUTROPHILS ABSOLUTE: 5.49 K/UL (ref 1.56–6.13)
NEUTROPHILS RELATIVE PERCENT: 72.9 % (ref 34–71.1)
PDW BLD-RTO: 19.8 % (ref 11.6–14.4)
PLATELET # BLD: 260 K/UL (ref 163–337)
PMV BLD AUTO: 10.6 FL (ref 7.4–10.4)
POTASSIUM SERPL-SCNC: 4.4 MMOL/L (ref 3.5–5.1)
RBC # BLD: 2.37 M/UL (ref 4.63–6.08)
SODIUM BLD-SCNC: 137 MMOL/L (ref 137–145)
TOTAL PROTEIN: 6.9 G/DL (ref 6.3–8.2)
WBC # BLD: 7.52 K/UL (ref 4.23–9.07)

## 2020-09-17 PROCEDURE — 96417 CHEMO IV INFUS EACH ADDL SEQ: CPT

## 2020-09-17 PROCEDURE — 80053 COMPREHEN METABOLIC PANEL: CPT

## 2020-09-17 PROCEDURE — 6360000002 HC RX W HCPCS: Performed by: PHYSICIAN ASSISTANT

## 2020-09-17 PROCEDURE — 85025 COMPLETE CBC W/AUTO DIFF WBC: CPT

## 2020-09-17 PROCEDURE — 96375 TX/PRO/DX INJ NEW DRUG ADDON: CPT

## 2020-09-17 PROCEDURE — 2580000003 HC RX 258: Performed by: INTERNAL MEDICINE

## 2020-09-17 PROCEDURE — 6360000002 HC RX W HCPCS: Performed by: INTERNAL MEDICINE

## 2020-09-17 PROCEDURE — 96413 CHEMO IV INFUSION 1 HR: CPT

## 2020-09-17 PROCEDURE — 96372 THER/PROPH/DIAG INJ SC/IM: CPT

## 2020-09-17 RX ORDER — SODIUM CHLORIDE 0.9 % (FLUSH) 0.9 %
10 SYRINGE (ML) INJECTION PRN
Status: DISCONTINUED | OUTPATIENT
Start: 2020-09-17 | End: 2020-09-18 | Stop reason: HOSPADM

## 2020-09-17 RX ORDER — DEXAMETHASONE SODIUM PHOSPHATE 10 MG/ML
10 INJECTION, SOLUTION INTRAMUSCULAR; INTRAVENOUS ONCE
Status: COMPLETED | OUTPATIENT
Start: 2020-09-17 | End: 2020-09-17

## 2020-09-17 RX ORDER — PALONOSETRON 0.05 MG/ML
0.25 INJECTION, SOLUTION INTRAVENOUS ONCE
Status: COMPLETED | OUTPATIENT
Start: 2020-09-17 | End: 2020-09-17

## 2020-09-17 RX ORDER — HEPARIN SODIUM (PORCINE) LOCK FLUSH IV SOLN 100 UNIT/ML 100 UNIT/ML
500 SOLUTION INTRAVENOUS PRN
Status: DISCONTINUED | OUTPATIENT
Start: 2020-09-17 | End: 2020-09-19 | Stop reason: HOSPADM

## 2020-09-17 RX ORDER — CYANOCOBALAMIN 1000 UG/ML
1000 INJECTION INTRAMUSCULAR; SUBCUTANEOUS ONCE
Status: COMPLETED | OUTPATIENT
Start: 2020-09-17 | End: 2020-09-17

## 2020-09-17 RX ADMIN — EPOETIN ALFA-EPBX 20000 UNITS: 10000 INJECTION, SOLUTION INTRAVENOUS; SUBCUTANEOUS at 11:42

## 2020-09-17 RX ADMIN — CYANOCOBALAMIN 1000 MCG: 1000 INJECTION, SOLUTION INTRAMUSCULAR; SUBCUTANEOUS at 11:39

## 2020-09-17 RX ADMIN — HEPARIN 500 UNITS: 100 SYRINGE at 11:45

## 2020-09-17 RX ADMIN — PALONOSETRON 0.25 MG: 0.05 INJECTION, SOLUTION INTRAVENOUS at 10:36

## 2020-09-17 RX ADMIN — SODIUM CHLORIDE 200 MG: 9 INJECTION, SOLUTION INTRAVENOUS at 10:53

## 2020-09-17 RX ADMIN — SODIUM CHLORIDE, PRESERVATIVE FREE 10 ML: 5 INJECTION INTRAVENOUS at 11:45

## 2020-09-17 RX ADMIN — SODIUM CHLORIDE 520 MG: 9 INJECTION, SOLUTION INTRAVENOUS at 11:24

## 2020-09-17 RX ADMIN — DEXAMETHASONE SODIUM PHOSPHATE 10 MG: 10 INJECTION, SOLUTION INTRAMUSCULAR; INTRAVENOUS at 10:36

## 2020-09-24 ENCOUNTER — HOSPITAL ENCOUNTER (OUTPATIENT)
Dept: INFUSION THERAPY | Age: 80
Discharge: HOME | End: 2020-09-24
Payer: MEDICARE

## 2020-09-24 VITALS
TEMPERATURE: 97.5 F | BODY MASS INDEX: 26.24 KG/M2 | HEIGHT: 64 IN | WEIGHT: 153.7 LBS | SYSTOLIC BLOOD PRESSURE: 120 MMHG | DIASTOLIC BLOOD PRESSURE: 64 MMHG | OXYGEN SATURATION: 97 % | HEART RATE: 53 BPM

## 2020-09-24 DIAGNOSIS — D63.1 ANEMIA, CHRONIC RENAL FAILURE, STAGE 3 (MODERATE) (HCC): ICD-10-CM

## 2020-09-24 DIAGNOSIS — N18.4 CHRONIC KIDNEY DISEASE, STAGE IV (SEVERE) (HCC): ICD-10-CM

## 2020-09-24 DIAGNOSIS — C34.90 NON-SMALL CELL LUNG CANCER, UNSPECIFIED LATERALITY (HCC): Primary | ICD-10-CM

## 2020-09-24 DIAGNOSIS — N18.30 ANEMIA, CHRONIC RENAL FAILURE, STAGE 3 (MODERATE) (HCC): ICD-10-CM

## 2020-09-24 DIAGNOSIS — C80.1 MALIGNANT NEOPLASM (HCC): ICD-10-CM

## 2020-09-24 LAB
BASOPHILS ABSOLUTE: 0.01 K/UL (ref 0.01–0.08)
BASOPHILS RELATIVE PERCENT: 0.4 % (ref 0.1–1.2)
EOSINOPHILS ABSOLUTE: 0.04 K/UL (ref 0.04–0.54)
EOSINOPHILS RELATIVE PERCENT: 1.7 % (ref 0.7–7)
HCT VFR BLD CALC: 28.4 % (ref 40.1–51)
HEMOGLOBIN: 8.5 G/DL (ref 13.7–17.5)
LYMPHOCYTES ABSOLUTE: 0.65 K/UL (ref 1.18–3.74)
LYMPHOCYTES RELATIVE PERCENT: 27.5 % (ref 19.3–53.1)
MCH RBC QN AUTO: 37.3 PG (ref 25.7–32.2)
MCHC RBC AUTO-ENTMCNC: 29.9 G/DL (ref 32.3–36.5)
MCV RBC AUTO: 124.6 FL (ref 79–92.2)
MONOCYTES ABSOLUTE: 0.13 K/UL (ref 0.24–0.82)
MONOCYTES RELATIVE PERCENT: 5.5 % (ref 4.7–12.5)
NEUTROPHILS ABSOLUTE: 1.53 K/UL (ref 1.56–6.13)
NEUTROPHILS RELATIVE PERCENT: 64.9 % (ref 34–71.1)
PDW BLD-RTO: 19.3 % (ref 11.6–14.4)
PLATELET # BLD: 134 K/UL (ref 163–337)
PMV BLD AUTO: 10.4 FL (ref 7.4–10.4)
RBC # BLD: 2.28 M/UL (ref 4.63–6.08)
WBC # BLD: 2.36 K/UL (ref 4.23–9.07)

## 2020-09-24 PROCEDURE — 6360000002 HC RX W HCPCS: Performed by: PHYSICIAN ASSISTANT

## 2020-09-24 PROCEDURE — 96372 THER/PROPH/DIAG INJ SC/IM: CPT

## 2020-09-24 PROCEDURE — 85025 COMPLETE CBC W/AUTO DIFF WBC: CPT

## 2020-09-24 RX ADMIN — EPOETIN ALFA-EPBX 20000 UNITS: 10000 INJECTION, SOLUTION INTRAVENOUS; SUBCUTANEOUS at 13:59

## 2020-10-01 ENCOUNTER — HOSPITAL ENCOUNTER (OUTPATIENT)
Dept: INFUSION THERAPY | Age: 80
Discharge: HOME | End: 2020-10-01
Payer: MEDICARE

## 2020-10-01 VITALS
TEMPERATURE: 98.4 F | HEIGHT: 64 IN | DIASTOLIC BLOOD PRESSURE: 60 MMHG | BODY MASS INDEX: 26.07 KG/M2 | OXYGEN SATURATION: 91 % | HEART RATE: 91 BPM | WEIGHT: 152.7 LBS | SYSTOLIC BLOOD PRESSURE: 110 MMHG

## 2020-10-01 DIAGNOSIS — N18.30 ANEMIA, CHRONIC RENAL FAILURE, STAGE 3 (MODERATE) (HCC): ICD-10-CM

## 2020-10-01 DIAGNOSIS — C34.90 NON-SMALL CELL LUNG CANCER, UNSPECIFIED LATERALITY (HCC): Primary | ICD-10-CM

## 2020-10-01 DIAGNOSIS — C80.1 MALIGNANT NEOPLASM (HCC): ICD-10-CM

## 2020-10-01 DIAGNOSIS — D63.1 ANEMIA, CHRONIC RENAL FAILURE, STAGE 3 (MODERATE) (HCC): ICD-10-CM

## 2020-10-01 DIAGNOSIS — N18.4 CHRONIC KIDNEY DISEASE, STAGE IV (SEVERE) (HCC): ICD-10-CM

## 2020-10-01 LAB
BASOPHILS ABSOLUTE: 0.03 K/UL (ref 0.01–0.08)
BASOPHILS RELATIVE PERCENT: 0.9 % (ref 0.1–1.2)
EOSINOPHILS ABSOLUTE: 0.04 K/UL (ref 0.04–0.54)
EOSINOPHILS RELATIVE PERCENT: 1.2 % (ref 0.7–7)
HCT VFR BLD CALC: 24.2 % (ref 40.1–51)
HEMOGLOBIN: 7.3 G/DL (ref 13.7–17.5)
LYMPHOCYTES ABSOLUTE: 0.92 K/UL (ref 1.18–3.74)
LYMPHOCYTES RELATIVE PERCENT: 27.1 % (ref 19.3–53.1)
MCH RBC QN AUTO: 37.2 PG (ref 25.7–32.2)
MCHC RBC AUTO-ENTMCNC: 30.2 G/DL (ref 32.3–36.5)
MCV RBC AUTO: 123.5 FL (ref 79–92.2)
MONOCYTES ABSOLUTE: 0.54 K/UL (ref 0.24–0.82)
MONOCYTES RELATIVE PERCENT: 15.9 % (ref 4.7–12.5)
NEUTROPHILS ABSOLUTE: 1.87 K/UL (ref 1.56–6.13)
NEUTROPHILS RELATIVE PERCENT: 54.9 % (ref 34–71.1)
PDW BLD-RTO: 18.7 % (ref 11.6–14.4)
PLATELET # BLD: 47 K/UL (ref 163–337)
PMV BLD AUTO: 11.6 FL (ref 7.4–10.4)
RBC # BLD: 1.96 M/UL (ref 4.63–6.08)
WBC # BLD: 3.4 K/UL (ref 4.23–9.07)

## 2020-10-01 PROCEDURE — 86900 BLOOD TYPING SEROLOGIC ABO: CPT

## 2020-10-01 PROCEDURE — 96372 THER/PROPH/DIAG INJ SC/IM: CPT

## 2020-10-01 PROCEDURE — 86850 RBC ANTIBODY SCREEN: CPT

## 2020-10-01 PROCEDURE — 6360000002 HC RX W HCPCS: Performed by: PHYSICIAN ASSISTANT

## 2020-10-01 PROCEDURE — 86901 BLOOD TYPING SEROLOGIC RH(D): CPT

## 2020-10-01 PROCEDURE — 86923 COMPATIBILITY TEST ELECTRIC: CPT

## 2020-10-01 PROCEDURE — 36430 TRANSFUSION BLD/BLD COMPNT: CPT

## 2020-10-01 PROCEDURE — 85025 COMPLETE CBC W/AUTO DIFF WBC: CPT

## 2020-10-01 PROCEDURE — P9016 RBC LEUKOCYTES REDUCED: HCPCS

## 2020-10-01 RX ORDER — SODIUM CHLORIDE 0.9 % (FLUSH) 0.9 %
20 SYRINGE (ML) INJECTION PRN
Status: CANCELLED | OUTPATIENT
Start: 2020-10-02

## 2020-10-01 RX ORDER — EPINEPHRINE 1 MG/ML
0.3 INJECTION, SOLUTION, CONCENTRATE INTRAVENOUS PRN
Status: CANCELLED | OUTPATIENT
Start: 2020-10-02

## 2020-10-01 RX ORDER — DIPHENHYDRAMINE HYDROCHLORIDE 50 MG/ML
50 INJECTION INTRAMUSCULAR; INTRAVENOUS ONCE
Status: CANCELLED | OUTPATIENT
Start: 2020-10-02

## 2020-10-01 RX ORDER — DIPHENHYDRAMINE HYDROCHLORIDE 50 MG/ML
25 INJECTION INTRAMUSCULAR; INTRAVENOUS ONCE
Status: CANCELLED | OUTPATIENT
Start: 2020-10-02

## 2020-10-01 RX ORDER — 0.9 % SODIUM CHLORIDE 0.9 %
250 INTRAVENOUS SOLUTION INTRAVENOUS ONCE
Status: CANCELLED | OUTPATIENT
Start: 2020-10-02

## 2020-10-01 RX ORDER — METHYLPREDNISOLONE SODIUM SUCCINATE 125 MG/2ML
125 INJECTION, POWDER, LYOPHILIZED, FOR SOLUTION INTRAMUSCULAR; INTRAVENOUS ONCE
Status: CANCELLED | OUTPATIENT
Start: 2020-10-02

## 2020-10-01 RX ORDER — ACETAMINOPHEN 500 MG
1000 TABLET ORAL ONCE
Status: CANCELLED | OUTPATIENT
Start: 2020-10-02

## 2020-10-01 RX ORDER — SODIUM CHLORIDE 9 MG/ML
INJECTION, SOLUTION INTRAVENOUS CONTINUOUS
Status: CANCELLED | OUTPATIENT
Start: 2020-10-02

## 2020-10-01 RX ADMIN — EPOETIN ALFA-EPBX 20000 UNITS: 10000 INJECTION, SOLUTION INTRAVENOUS; SUBCUTANEOUS at 14:31

## 2020-10-02 ENCOUNTER — HOSPITAL ENCOUNTER (OUTPATIENT)
Dept: INFUSION THERAPY | Age: 80
Setting detail: INFUSION SERIES
Discharge: HOME | End: 2020-10-02
Payer: MEDICARE

## 2020-10-02 VITALS
OXYGEN SATURATION: 97 % | TEMPERATURE: 97.5 F | RESPIRATION RATE: 18 BRPM | HEART RATE: 90 BPM | SYSTOLIC BLOOD PRESSURE: 121 MMHG | DIASTOLIC BLOOD PRESSURE: 77 MMHG

## 2020-10-02 DIAGNOSIS — D64.9 SYMPTOMATIC ANEMIA: Primary | ICD-10-CM

## 2020-10-02 PROCEDURE — 6360000002 HC RX W HCPCS: Performed by: PHYSICIAN ASSISTANT

## 2020-10-02 PROCEDURE — 6360000002 HC RX W HCPCS: Performed by: INTERNAL MEDICINE

## 2020-10-02 PROCEDURE — 96374 THER/PROPH/DIAG INJ IV PUSH: CPT

## 2020-10-02 PROCEDURE — P9016 RBC LEUKOCYTES REDUCED: HCPCS

## 2020-10-02 PROCEDURE — 36430 TRANSFUSION BLD/BLD COMPNT: CPT

## 2020-10-02 PROCEDURE — 96375 TX/PRO/DX INJ NEW DRUG ADDON: CPT

## 2020-10-02 PROCEDURE — 2580000003 HC RX 258: Performed by: PHYSICIAN ASSISTANT

## 2020-10-02 PROCEDURE — 6370000000 HC RX 637 (ALT 250 FOR IP): Performed by: PHYSICIAN ASSISTANT

## 2020-10-02 RX ORDER — DIPHENHYDRAMINE HYDROCHLORIDE 50 MG/ML
25 INJECTION INTRAMUSCULAR; INTRAVENOUS ONCE
Status: COMPLETED | OUTPATIENT
Start: 2020-10-02 | End: 2020-10-02

## 2020-10-02 RX ORDER — EPINEPHRINE 1 MG/ML
0.3 INJECTION, SOLUTION, CONCENTRATE INTRAVENOUS PRN
Status: CANCELLED | OUTPATIENT
Start: 2020-10-02

## 2020-10-02 RX ORDER — SODIUM CHLORIDE 9 MG/ML
INJECTION, SOLUTION INTRAVENOUS CONTINUOUS
Status: CANCELLED | OUTPATIENT
Start: 2020-10-02

## 2020-10-02 RX ORDER — METHYLPREDNISOLONE SODIUM SUCCINATE 125 MG/2ML
125 INJECTION, POWDER, LYOPHILIZED, FOR SOLUTION INTRAMUSCULAR; INTRAVENOUS ONCE
Status: CANCELLED | OUTPATIENT
Start: 2020-10-02

## 2020-10-02 RX ORDER — ACETAMINOPHEN 500 MG
1000 TABLET ORAL ONCE
Status: COMPLETED | OUTPATIENT
Start: 2020-10-02 | End: 2020-10-02

## 2020-10-02 RX ORDER — 0.9 % SODIUM CHLORIDE 0.9 %
250 INTRAVENOUS SOLUTION INTRAVENOUS ONCE
Status: COMPLETED | OUTPATIENT
Start: 2020-10-02 | End: 2020-10-02

## 2020-10-02 RX ORDER — DIPHENHYDRAMINE HYDROCHLORIDE 50 MG/ML
25 INJECTION INTRAMUSCULAR; INTRAVENOUS ONCE
Status: CANCELLED | OUTPATIENT
Start: 2020-10-02

## 2020-10-02 RX ORDER — SODIUM CHLORIDE 0.9 % (FLUSH) 0.9 %
20 SYRINGE (ML) INJECTION PRN
Status: DISCONTINUED | OUTPATIENT
Start: 2020-10-02 | End: 2020-10-04 | Stop reason: HOSPADM

## 2020-10-02 RX ORDER — 0.9 % SODIUM CHLORIDE 0.9 %
250 INTRAVENOUS SOLUTION INTRAVENOUS ONCE
Status: CANCELLED | OUTPATIENT
Start: 2020-10-02

## 2020-10-02 RX ORDER — SODIUM CHLORIDE 0.9 % (FLUSH) 0.9 %
20 SYRINGE (ML) INJECTION PRN
Status: CANCELLED | OUTPATIENT
Start: 2020-10-02

## 2020-10-02 RX ORDER — DIPHENHYDRAMINE HYDROCHLORIDE 50 MG/ML
50 INJECTION INTRAMUSCULAR; INTRAVENOUS ONCE
Status: CANCELLED | OUTPATIENT
Start: 2020-10-02

## 2020-10-02 RX ORDER — ACETAMINOPHEN 500 MG
1000 TABLET ORAL ONCE
Status: CANCELLED | OUTPATIENT
Start: 2020-10-02

## 2020-10-02 RX ORDER — HEPARIN SODIUM (PORCINE) LOCK FLUSH IV SOLN 100 UNIT/ML 100 UNIT/ML
500 SOLUTION INTRAVENOUS ONCE
Status: COMPLETED | OUTPATIENT
Start: 2020-10-02 | End: 2020-10-02

## 2020-10-02 RX ADMIN — HEPARIN 500 UNITS: 100 SYRINGE at 13:35

## 2020-10-02 RX ADMIN — ACETAMINOPHEN 1000 MG: 500 TABLET, FILM COATED ORAL at 11:18

## 2020-10-02 RX ADMIN — HYDROCORTISONE SODIUM SUCCINATE 100 MG: 100 INJECTION, POWDER, FOR SOLUTION INTRAMUSCULAR; INTRAVENOUS at 11:18

## 2020-10-02 RX ADMIN — SODIUM CHLORIDE 250 ML: 9 INJECTION, SOLUTION INTRAVENOUS at 11:17

## 2020-10-02 RX ADMIN — SODIUM CHLORIDE, PRESERVATIVE FREE 20 ML: 5 INJECTION INTRAVENOUS at 13:35

## 2020-10-02 RX ADMIN — DIPHENHYDRAMINE HYDROCHLORIDE 25 MG: 50 INJECTION, SOLUTION INTRAMUSCULAR; INTRAVENOUS at 11:18

## 2020-10-02 ASSESSMENT — PAIN SCALES - GENERAL: PAINLEVEL_OUTOF10: 0

## 2020-10-07 RX ORDER — METHYLPREDNISOLONE SODIUM SUCCINATE 125 MG/2ML
125 INJECTION, POWDER, LYOPHILIZED, FOR SOLUTION INTRAMUSCULAR; INTRAVENOUS PRN
Status: CANCELLED | OUTPATIENT
Start: 2020-10-08

## 2020-10-07 RX ORDER — CYANOCOBALAMIN 1000 UG/ML
1000 INJECTION INTRAMUSCULAR; SUBCUTANEOUS ONCE
Status: CANCELLED | OUTPATIENT
Start: 2020-10-08

## 2020-10-07 RX ORDER — EPINEPHRINE 1 MG/ML
0.3 INJECTION, SOLUTION, CONCENTRATE INTRAVENOUS PRN
Status: CANCELLED | OUTPATIENT
Start: 2020-10-08

## 2020-10-07 RX ORDER — PALONOSETRON 0.05 MG/ML
0.25 INJECTION, SOLUTION INTRAVENOUS ONCE
Status: CANCELLED | OUTPATIENT
Start: 2020-10-08

## 2020-10-07 RX ORDER — SODIUM CHLORIDE 9 MG/ML
20 INJECTION, SOLUTION INTRAVENOUS ONCE
Status: CANCELLED | OUTPATIENT
Start: 2020-10-08

## 2020-10-07 RX ORDER — SODIUM CHLORIDE 0.9 % (FLUSH) 0.9 %
5 SYRINGE (ML) INJECTION PRN
Status: CANCELLED | OUTPATIENT
Start: 2020-10-08

## 2020-10-07 RX ORDER — DIPHENHYDRAMINE HYDROCHLORIDE 50 MG/ML
50 INJECTION INTRAMUSCULAR; INTRAVENOUS PRN
Status: CANCELLED | OUTPATIENT
Start: 2020-10-08

## 2020-10-07 RX ORDER — HEPARIN SODIUM (PORCINE) LOCK FLUSH IV SOLN 100 UNIT/ML 100 UNIT/ML
500 SOLUTION INTRAVENOUS PRN
Status: CANCELLED | OUTPATIENT
Start: 2020-10-08

## 2020-10-07 RX ORDER — SODIUM CHLORIDE 0.9 % (FLUSH) 0.9 %
10 SYRINGE (ML) INJECTION PRN
Status: CANCELLED | OUTPATIENT
Start: 2020-10-08

## 2020-10-08 ENCOUNTER — HOSPITAL ENCOUNTER (OUTPATIENT)
Dept: INFUSION THERAPY | Age: 80
Discharge: HOME | End: 2020-10-08
Payer: MEDICARE

## 2020-10-08 VITALS
SYSTOLIC BLOOD PRESSURE: 156 MMHG | TEMPERATURE: 97.5 F | HEART RATE: 81 BPM | RESPIRATION RATE: 14 BRPM | OXYGEN SATURATION: 97 % | DIASTOLIC BLOOD PRESSURE: 72 MMHG

## 2020-10-08 DIAGNOSIS — C34.90 NON-SMALL CELL LUNG CANCER, UNSPECIFIED LATERALITY (HCC): Primary | ICD-10-CM

## 2020-10-08 DIAGNOSIS — D63.1 ANEMIA, CHRONIC RENAL FAILURE, STAGE 3 (MODERATE) (HCC): ICD-10-CM

## 2020-10-08 DIAGNOSIS — C34.11 MALIGNANT NEOPLASM OF UPPER LOBE, RIGHT BRONCHUS OR LUNG (HCC): ICD-10-CM

## 2020-10-08 DIAGNOSIS — N18.30 ANEMIA, CHRONIC RENAL FAILURE, STAGE 3 (MODERATE) (HCC): ICD-10-CM

## 2020-10-08 LAB
ALBUMIN SERPL-MCNC: 3.3 G/DL (ref 3.5–5.2)
ALP BLD-CCNC: 124 U/L (ref 40–130)
ALT SERPL-CCNC: 21 U/L (ref 21–72)
ANION GAP SERPL CALCULATED.3IONS-SCNC: 6 MMOL/L (ref 7–19)
AST SERPL-CCNC: 46 U/L (ref 17–59)
BASOPHILS ABSOLUTE: 0.02 K/UL (ref 0.01–0.08)
BASOPHILS RELATIVE PERCENT: 0.3 % (ref 0.1–1.2)
BILIRUB SERPL-MCNC: 0.3 MG/DL (ref 0.2–1.3)
BUN BLDV-MCNC: 26 MG/DL (ref 9–20)
CALCIUM SERPL-MCNC: 9.6 MG/DL (ref 8.4–10.2)
CHLORIDE BLD-SCNC: 104 MMOL/L (ref 98–111)
CO2: 26 MMOL/L (ref 22–29)
CREAT SERPL-MCNC: 1.9 MG/DL (ref 0.6–1.2)
EOSINOPHILS ABSOLUTE: 0.02 K/UL (ref 0.04–0.54)
EOSINOPHILS RELATIVE PERCENT: 0.3 % (ref 0.7–7)
GFR NON-AFRICAN AMERICAN: 34
GLUCOSE BLD-MCNC: 154 MG/DL (ref 74–106)
HCT VFR BLD CALC: 29.3 % (ref 40.1–51)
HEMOGLOBIN: 9.3 G/DL (ref 13.7–17.5)
LYMPHOCYTES ABSOLUTE: 0.56 K/UL (ref 1.18–3.74)
LYMPHOCYTES RELATIVE PERCENT: 7.7 % (ref 19.3–53.1)
MCH RBC QN AUTO: 36.6 PG (ref 25.7–32.2)
MCHC RBC AUTO-ENTMCNC: 31.7 G/DL (ref 32.3–36.5)
MCV RBC AUTO: 115.4 FL (ref 79–92.2)
MONOCYTES ABSOLUTE: 1.07 K/UL (ref 0.24–0.82)
MONOCYTES RELATIVE PERCENT: 14.6 % (ref 4.7–12.5)
NEUTROPHILS ABSOLUTE: 5.65 K/UL (ref 1.56–6.13)
NEUTROPHILS RELATIVE PERCENT: 77.1 % (ref 34–71.1)
PDW BLD-RTO: 21 % (ref 11.6–14.4)
PLATELET # BLD: 234 K/UL (ref 163–337)
PMV BLD AUTO: 10.4 FL (ref 7.4–10.4)
POTASSIUM SERPL-SCNC: 5 MMOL/L (ref 3.5–5.1)
RBC # BLD: 2.54 M/UL (ref 4.63–6.08)
SODIUM BLD-SCNC: 136 MMOL/L (ref 137–145)
TOTAL PROTEIN: 6.9 G/DL (ref 6.3–8.2)
TSH SERPL DL<=0.05 MIU/L-ACNC: 1.38 UIU/ML (ref 0.47–4.68)
WBC # BLD: 7.32 K/UL (ref 4.23–9.07)

## 2020-10-08 PROCEDURE — 85025 COMPLETE CBC W/AUTO DIFF WBC: CPT

## 2020-10-08 PROCEDURE — 2580000003 HC RX 258: Performed by: PHYSICIAN ASSISTANT

## 2020-10-08 PROCEDURE — 6360000002 HC RX W HCPCS: Performed by: PHYSICIAN ASSISTANT

## 2020-10-08 PROCEDURE — 96372 THER/PROPH/DIAG INJ SC/IM: CPT

## 2020-10-08 PROCEDURE — 80053 COMPREHEN METABOLIC PANEL: CPT

## 2020-10-08 PROCEDURE — 96417 CHEMO IV INFUS EACH ADDL SEQ: CPT

## 2020-10-08 PROCEDURE — 96375 TX/PRO/DX INJ NEW DRUG ADDON: CPT

## 2020-10-08 PROCEDURE — 96413 CHEMO IV INFUSION 1 HR: CPT

## 2020-10-08 PROCEDURE — 84443 ASSAY THYROID STIM HORMONE: CPT

## 2020-10-08 RX ORDER — DEXAMETHASONE SODIUM PHOSPHATE 10 MG/ML
10 INJECTION, SOLUTION INTRAMUSCULAR; INTRAVENOUS ONCE
Status: COMPLETED | OUTPATIENT
Start: 2020-10-08 | End: 2020-10-08

## 2020-10-08 RX ORDER — HEPARIN SODIUM (PORCINE) LOCK FLUSH IV SOLN 100 UNIT/ML 100 UNIT/ML
500 SOLUTION INTRAVENOUS PRN
Status: DISCONTINUED | OUTPATIENT
Start: 2020-10-08 | End: 2020-10-10 | Stop reason: HOSPADM

## 2020-10-08 RX ORDER — CYANOCOBALAMIN 1000 UG/ML
1000 INJECTION INTRAMUSCULAR; SUBCUTANEOUS ONCE
Status: COMPLETED | OUTPATIENT
Start: 2020-10-08 | End: 2020-10-08

## 2020-10-08 RX ORDER — SODIUM CHLORIDE 0.9 % (FLUSH) 0.9 %
5 SYRINGE (ML) INJECTION PRN
Status: DISCONTINUED | OUTPATIENT
Start: 2020-10-08 | End: 2020-10-09 | Stop reason: HOSPADM

## 2020-10-08 RX ORDER — PALONOSETRON 0.05 MG/ML
0.25 INJECTION, SOLUTION INTRAVENOUS ONCE
Status: COMPLETED | OUTPATIENT
Start: 2020-10-08 | End: 2020-10-08

## 2020-10-08 RX ORDER — SODIUM CHLORIDE 0.9 % (FLUSH) 0.9 %
10 SYRINGE (ML) INJECTION PRN
Status: DISCONTINUED | OUTPATIENT
Start: 2020-10-08 | End: 2020-10-09 | Stop reason: HOSPADM

## 2020-10-08 RX ADMIN — SODIUM CHLORIDE 520 MG: 9 INJECTION, SOLUTION INTRAVENOUS at 10:41

## 2020-10-08 RX ADMIN — CYANOCOBALAMIN 1000 MCG: 1000 INJECTION, SOLUTION INTRAMUSCULAR; SUBCUTANEOUS at 10:14

## 2020-10-08 RX ADMIN — DEXAMETHASONE SODIUM PHOSPHATE 10 MG: 10 INJECTION, SOLUTION INTRAMUSCULAR; INTRAVENOUS at 10:14

## 2020-10-08 RX ADMIN — PALONOSETRON 0.25 MG: 0.05 INJECTION, SOLUTION INTRAVENOUS at 10:14

## 2020-10-08 RX ADMIN — HEPARIN 500 UNITS: 100 SYRINGE at 11:29

## 2020-10-08 RX ADMIN — SODIUM CHLORIDE 200 MG: 9 INJECTION, SOLUTION INTRAVENOUS at 11:15

## 2020-10-08 RX ADMIN — SODIUM CHLORIDE, PRESERVATIVE FREE 10 ML: 5 INJECTION INTRAVENOUS at 11:29

## 2020-10-15 ENCOUNTER — HOSPITAL ENCOUNTER (OUTPATIENT)
Dept: INFUSION THERAPY | Age: 80
Discharge: HOME | End: 2020-10-15
Payer: MEDICARE

## 2020-10-15 VITALS
WEIGHT: 153 LBS | BODY MASS INDEX: 26.26 KG/M2 | SYSTOLIC BLOOD PRESSURE: 98 MMHG | DIASTOLIC BLOOD PRESSURE: 62 MMHG | HEART RATE: 92 BPM | OXYGEN SATURATION: 92 %

## 2020-10-15 DIAGNOSIS — C80.1 MALIGNANT NEOPLASM (HCC): ICD-10-CM

## 2020-10-15 DIAGNOSIS — N18.4 CHRONIC KIDNEY DISEASE, STAGE IV (SEVERE) (HCC): ICD-10-CM

## 2020-10-15 DIAGNOSIS — C34.90 NON-SMALL CELL LUNG CANCER, UNSPECIFIED LATERALITY (HCC): Primary | ICD-10-CM

## 2020-10-15 DIAGNOSIS — N18.30 ANEMIA, CHRONIC RENAL FAILURE, STAGE 3 (MODERATE) (HCC): ICD-10-CM

## 2020-10-15 DIAGNOSIS — D63.1 ANEMIA, CHRONIC RENAL FAILURE, STAGE 3 (MODERATE) (HCC): ICD-10-CM

## 2020-10-15 LAB
BASOPHILS ABSOLUTE: 0.01 K/UL (ref 0.01–0.08)
BASOPHILS RELATIVE PERCENT: 0.4 % (ref 0.1–1.2)
EOSINOPHILS ABSOLUTE: 0.04 K/UL (ref 0.04–0.54)
EOSINOPHILS RELATIVE PERCENT: 1.6 % (ref 0.7–7)
HCT VFR BLD CALC: 28.9 % (ref 40.1–51)
HEMOGLOBIN: 8.9 G/DL (ref 13.7–17.5)
LYMPHOCYTES ABSOLUTE: 0.45 K/UL (ref 1.18–3.74)
LYMPHOCYTES RELATIVE PERCENT: 18.4 % (ref 19.3–53.1)
MCH RBC QN AUTO: 36.9 PG (ref 25.7–32.2)
MCHC RBC AUTO-ENTMCNC: 30.8 G/DL (ref 32.3–36.5)
MCV RBC AUTO: 119.9 FL (ref 79–92.2)
MONOCYTES ABSOLUTE: 0.1 K/UL (ref 0.24–0.82)
MONOCYTES RELATIVE PERCENT: 4.1 % (ref 4.7–12.5)
NEUTROPHILS ABSOLUTE: 1.85 K/UL (ref 1.56–6.13)
NEUTROPHILS RELATIVE PERCENT: 75.5 % (ref 34–71.1)
PDW BLD-RTO: 20.1 % (ref 11.6–14.4)
PLATELET # BLD: 144 K/UL (ref 163–337)
PMV BLD AUTO: 10.6 FL (ref 7.4–10.4)
RBC # BLD: 2.41 M/UL (ref 4.63–6.08)
WBC # BLD: 2.45 K/UL (ref 4.23–9.07)

## 2020-10-15 PROCEDURE — 6360000002 HC RX W HCPCS: Performed by: INTERNAL MEDICINE

## 2020-10-15 PROCEDURE — 85025 COMPLETE CBC W/AUTO DIFF WBC: CPT

## 2020-10-15 PROCEDURE — 96372 THER/PROPH/DIAG INJ SC/IM: CPT

## 2020-10-15 RX ADMIN — EPOETIN ALFA-EPBX 20000 UNITS: 10000 INJECTION, SOLUTION INTRAVENOUS; SUBCUTANEOUS at 13:43

## 2020-10-22 ENCOUNTER — APPOINTMENT (OUTPATIENT)
Dept: CT IMAGING | Facility: HOSPITAL | Age: 80
End: 2020-10-22

## 2020-10-22 ENCOUNTER — HOSPITAL ENCOUNTER (OUTPATIENT)
Dept: INFUSION THERAPY | Age: 80
Discharge: HOME | End: 2020-10-22
Payer: MEDICARE

## 2020-10-22 ENCOUNTER — HOSPITAL ENCOUNTER (OUTPATIENT)
Dept: INFUSION THERAPY | Age: 80
Setting detail: INFUSION SERIES
Discharge: HOME | End: 2020-10-22
Payer: MEDICARE

## 2020-10-22 VITALS
HEART RATE: 104 BPM | BODY MASS INDEX: 25.68 KG/M2 | DIASTOLIC BLOOD PRESSURE: 60 MMHG | HEIGHT: 64 IN | OXYGEN SATURATION: 86 % | TEMPERATURE: 97.8 F | SYSTOLIC BLOOD PRESSURE: 122 MMHG | WEIGHT: 150.4 LBS

## 2020-10-22 VITALS
HEART RATE: 88 BPM | DIASTOLIC BLOOD PRESSURE: 75 MMHG | RESPIRATION RATE: 18 BRPM | TEMPERATURE: 98.3 F | SYSTOLIC BLOOD PRESSURE: 124 MMHG | OXYGEN SATURATION: 95 %

## 2020-10-22 DIAGNOSIS — D63.1 ANEMIA, CHRONIC RENAL FAILURE, STAGE 3 (MODERATE) (HCC): ICD-10-CM

## 2020-10-22 DIAGNOSIS — N18.4 CHRONIC KIDNEY DISEASE, STAGE IV (SEVERE) (HCC): ICD-10-CM

## 2020-10-22 DIAGNOSIS — C34.90 NON-SMALL CELL LUNG CANCER, UNSPECIFIED LATERALITY (HCC): Primary | ICD-10-CM

## 2020-10-22 DIAGNOSIS — C80.1 MALIGNANT NEOPLASM (HCC): ICD-10-CM

## 2020-10-22 DIAGNOSIS — D64.9 SYMPTOMATIC ANEMIA: Primary | ICD-10-CM

## 2020-10-22 DIAGNOSIS — N18.30 ANEMIA, CHRONIC RENAL FAILURE, STAGE 3 (MODERATE) (HCC): ICD-10-CM

## 2020-10-22 LAB
ABO/RH: NORMAL
ANTIBODY SCREEN: NORMAL
BASOPHILS ABSOLUTE: 0.01 K/UL (ref 0.01–0.08)
BASOPHILS RELATIVE PERCENT: 0.3 % (ref 0.1–1.2)
BLOOD BANK DISPENSE STATUS: NORMAL
BLOOD BANK PRODUCT CODE: NORMAL
BPU ID: NORMAL
DESCRIPTION BLOOD BANK: NORMAL
EOSINOPHILS ABSOLUTE: 0.02 K/UL (ref 0.04–0.54)
EOSINOPHILS RELATIVE PERCENT: 0.7 % (ref 0.7–7)
HCT VFR BLD CALC: 25.5 % (ref 40.1–51)
HEMOGLOBIN: 7.8 G/DL (ref 13.7–17.5)
LYMPHOCYTES ABSOLUTE: 0.65 K/UL (ref 1.18–3.74)
LYMPHOCYTES RELATIVE PERCENT: 21.4 % (ref 19.3–53.1)
MCH RBC QN AUTO: 36.4 PG (ref 25.7–32.2)
MCHC RBC AUTO-ENTMCNC: 30.6 G/DL (ref 32.3–36.5)
MCV RBC AUTO: 119.2 FL (ref 79–92.2)
MONOCYTES ABSOLUTE: 0.58 K/UL (ref 0.24–0.82)
MONOCYTES RELATIVE PERCENT: 19.1 % (ref 4.7–12.5)
NEUTROPHILS ABSOLUTE: 1.78 K/UL (ref 1.56–6.13)
NEUTROPHILS RELATIVE PERCENT: 58.5 % (ref 34–71.1)
PDW BLD-RTO: 20.2 % (ref 11.6–14.4)
PLATELET # BLD: 58 K/UL (ref 163–337)
PMV BLD AUTO: 10.5 FL (ref 7.4–10.4)
RBC # BLD: 2.14 M/UL (ref 4.63–6.08)
WBC # BLD: 3.04 K/UL (ref 4.23–9.07)

## 2020-10-22 PROCEDURE — 86850 RBC ANTIBODY SCREEN: CPT

## 2020-10-22 PROCEDURE — 2580000003 HC RX 258: Performed by: INTERNAL MEDICINE

## 2020-10-22 PROCEDURE — 6360000002 HC RX W HCPCS: Performed by: INTERNAL MEDICINE

## 2020-10-22 PROCEDURE — 6370000000 HC RX 637 (ALT 250 FOR IP): Performed by: INTERNAL MEDICINE

## 2020-10-22 PROCEDURE — 6360000002 HC RX W HCPCS: Performed by: NURSE PRACTITIONER

## 2020-10-22 PROCEDURE — 86900 BLOOD TYPING SEROLOGIC ABO: CPT

## 2020-10-22 PROCEDURE — 86901 BLOOD TYPING SEROLOGIC RH(D): CPT

## 2020-10-22 PROCEDURE — 36430 TRANSFUSION BLD/BLD COMPNT: CPT

## 2020-10-22 PROCEDURE — 96372 THER/PROPH/DIAG INJ SC/IM: CPT

## 2020-10-22 PROCEDURE — 85025 COMPLETE CBC W/AUTO DIFF WBC: CPT

## 2020-10-22 PROCEDURE — 6360000002 HC RX W HCPCS: Performed by: PHYSICIAN ASSISTANT

## 2020-10-22 PROCEDURE — P9016 RBC LEUKOCYTES REDUCED: HCPCS

## 2020-10-22 PROCEDURE — 86923 COMPATIBILITY TEST ELECTRIC: CPT

## 2020-10-22 PROCEDURE — 36591 DRAW BLOOD OFF VENOUS DEVICE: CPT

## 2020-10-22 PROCEDURE — 2580000003 HC RX 258: Performed by: PHYSICIAN ASSISTANT

## 2020-10-22 RX ORDER — DIPHENHYDRAMINE HCL 25 MG
25 TABLET ORAL ONCE
Status: CANCELLED | OUTPATIENT
Start: 2020-10-22

## 2020-10-22 RX ORDER — ACETAMINOPHEN 325 MG/1
650 TABLET ORAL ONCE
Status: COMPLETED | OUTPATIENT
Start: 2020-10-22 | End: 2020-10-22

## 2020-10-22 RX ORDER — EPINEPHRINE 1 MG/ML
0.3 INJECTION, SOLUTION, CONCENTRATE INTRAVENOUS PRN
Status: CANCELLED | OUTPATIENT
Start: 2020-10-22

## 2020-10-22 RX ORDER — SODIUM CHLORIDE 0.9 % (FLUSH) 0.9 %
20 SYRINGE (ML) INJECTION PRN
Status: DISCONTINUED | OUTPATIENT
Start: 2020-10-22 | End: 2020-10-24 | Stop reason: HOSPADM

## 2020-10-22 RX ORDER — 0.9 % SODIUM CHLORIDE 0.9 %
250 INTRAVENOUS SOLUTION INTRAVENOUS ONCE
Status: CANCELLED | OUTPATIENT
Start: 2020-10-22

## 2020-10-22 RX ORDER — METHYLPREDNISOLONE SODIUM SUCCINATE 125 MG/2ML
125 INJECTION, POWDER, LYOPHILIZED, FOR SOLUTION INTRAMUSCULAR; INTRAVENOUS ONCE
Status: CANCELLED | OUTPATIENT
Start: 2020-10-22

## 2020-10-22 RX ORDER — FUROSEMIDE 10 MG/ML
20 INJECTION INTRAMUSCULAR; INTRAVENOUS ONCE
Status: CANCELLED | OUTPATIENT
Start: 2020-10-22

## 2020-10-22 RX ORDER — DIPHENHYDRAMINE HYDROCHLORIDE 50 MG/ML
50 INJECTION INTRAMUSCULAR; INTRAVENOUS ONCE
Status: CANCELLED | OUTPATIENT
Start: 2020-10-22

## 2020-10-22 RX ORDER — 0.9 % SODIUM CHLORIDE 0.9 %
250 INTRAVENOUS SOLUTION INTRAVENOUS ONCE
Status: COMPLETED | OUTPATIENT
Start: 2020-10-22 | End: 2020-10-22

## 2020-10-22 RX ORDER — SODIUM CHLORIDE 0.9 % (FLUSH) 0.9 %
20 SYRINGE (ML) INJECTION PRN
Status: CANCELLED | OUTPATIENT
Start: 2020-10-22

## 2020-10-22 RX ORDER — HEPARIN SODIUM (PORCINE) LOCK FLUSH IV SOLN 100 UNIT/ML 100 UNIT/ML
300 SOLUTION INTRAVENOUS PRN
Status: DISCONTINUED | OUTPATIENT
Start: 2020-10-22 | End: 2020-10-24 | Stop reason: HOSPADM

## 2020-10-22 RX ORDER — FUROSEMIDE 10 MG/ML
20 INJECTION INTRAMUSCULAR; INTRAVENOUS ONCE
Status: COMPLETED | OUTPATIENT
Start: 2020-10-22 | End: 2020-10-22

## 2020-10-22 RX ORDER — SODIUM CHLORIDE 9 MG/ML
INJECTION, SOLUTION INTRAVENOUS CONTINUOUS
Status: CANCELLED | OUTPATIENT
Start: 2020-10-22

## 2020-10-22 RX ORDER — ACETAMINOPHEN 325 MG/1
650 TABLET ORAL ONCE
Status: CANCELLED | OUTPATIENT
Start: 2020-10-22

## 2020-10-22 RX ORDER — DIPHENHYDRAMINE HCL 25 MG
25 TABLET ORAL ONCE
Status: COMPLETED | OUTPATIENT
Start: 2020-10-22 | End: 2020-10-22

## 2020-10-22 RX ADMIN — FUROSEMIDE 20 MG: 10 INJECTION, SOLUTION INTRAMUSCULAR; INTRAVENOUS at 16:07

## 2020-10-22 RX ADMIN — EPOETIN ALFA-EPBX 20000 UNITS: 10000 INJECTION, SOLUTION INTRAVENOUS; SUBCUTANEOUS at 12:30

## 2020-10-22 RX ADMIN — HYDROCORTISONE SODIUM SUCCINATE 100 MG: 100 INJECTION, POWDER, FOR SOLUTION INTRAMUSCULAR; INTRAVENOUS at 14:25

## 2020-10-22 RX ADMIN — HEPARIN 300 UNITS: 100 SYRINGE at 16:25

## 2020-10-22 RX ADMIN — DIPHENHYDRAMINE HCL 25 MG: 25 TABLET ORAL at 14:25

## 2020-10-22 RX ADMIN — SODIUM CHLORIDE, PRESERVATIVE FREE 20 ML: 5 INJECTION INTRAVENOUS at 16:25

## 2020-10-22 RX ADMIN — ACETAMINOPHEN 650 MG: 325 TABLET ORAL at 14:24

## 2020-10-22 RX ADMIN — SODIUM CHLORIDE 250 ML: 9 INJECTION, SOLUTION INTRAVENOUS at 14:24

## 2020-10-22 ASSESSMENT — PAIN SCALES - GENERAL: PAINLEVEL_OUTOF10: 0

## 2020-10-28 RX ORDER — SODIUM CHLORIDE 0.9 % (FLUSH) 0.9 %
10 SYRINGE (ML) INJECTION PRN
Status: CANCELLED | OUTPATIENT
Start: 2020-10-29

## 2020-10-28 RX ORDER — HEPARIN SODIUM (PORCINE) LOCK FLUSH IV SOLN 100 UNIT/ML 100 UNIT/ML
500 SOLUTION INTRAVENOUS PRN
Status: CANCELLED | OUTPATIENT
Start: 2020-10-29

## 2020-10-28 RX ORDER — CYANOCOBALAMIN 1000 UG/ML
1000 INJECTION INTRAMUSCULAR; SUBCUTANEOUS ONCE
Status: CANCELLED | OUTPATIENT
Start: 2020-10-29

## 2020-10-28 RX ORDER — METHYLPREDNISOLONE SODIUM SUCCINATE 125 MG/2ML
125 INJECTION, POWDER, LYOPHILIZED, FOR SOLUTION INTRAMUSCULAR; INTRAVENOUS PRN
Status: CANCELLED | OUTPATIENT
Start: 2020-10-29

## 2020-10-28 RX ORDER — SODIUM CHLORIDE 0.9 % (FLUSH) 0.9 %
5 SYRINGE (ML) INJECTION PRN
Status: CANCELLED | OUTPATIENT
Start: 2020-10-29

## 2020-10-28 RX ORDER — PALONOSETRON 0.05 MG/ML
0.25 INJECTION, SOLUTION INTRAVENOUS ONCE
Status: CANCELLED | OUTPATIENT
Start: 2020-10-29

## 2020-10-28 RX ORDER — EPINEPHRINE 1 MG/ML
0.3 INJECTION, SOLUTION, CONCENTRATE INTRAVENOUS PRN
Status: CANCELLED | OUTPATIENT
Start: 2020-10-29

## 2020-10-28 RX ORDER — SODIUM CHLORIDE 9 MG/ML
20 INJECTION, SOLUTION INTRAVENOUS ONCE
Status: CANCELLED | OUTPATIENT
Start: 2020-10-29

## 2020-10-28 RX ORDER — DIPHENHYDRAMINE HYDROCHLORIDE 50 MG/ML
50 INJECTION INTRAMUSCULAR; INTRAVENOUS PRN
Status: CANCELLED | OUTPATIENT
Start: 2020-10-29

## 2020-10-29 ENCOUNTER — HOSPITAL ENCOUNTER (OUTPATIENT)
Dept: INFUSION THERAPY | Age: 80
Discharge: HOME | End: 2020-10-29
Payer: MEDICARE

## 2020-10-29 ENCOUNTER — HOSPITAL ENCOUNTER (OUTPATIENT)
Dept: GENERAL RADIOLOGY | Age: 80
Discharge: HOME | End: 2020-10-29
Payer: MEDICARE

## 2020-10-29 VITALS
WEIGHT: 157 LBS | TEMPERATURE: 97.3 F | HEIGHT: 64 IN | HEART RATE: 103 BPM | SYSTOLIC BLOOD PRESSURE: 156 MMHG | OXYGEN SATURATION: 91 % | BODY MASS INDEX: 26.8 KG/M2 | DIASTOLIC BLOOD PRESSURE: 86 MMHG

## 2020-10-29 DIAGNOSIS — R53.83 FATIGUE DUE TO TREATMENT: ICD-10-CM

## 2020-10-29 DIAGNOSIS — C80.1 MALIGNANT NEOPLASM (HCC): ICD-10-CM

## 2020-10-29 DIAGNOSIS — N18.4 CHRONIC KIDNEY DISEASE, STAGE IV (SEVERE) (HCC): ICD-10-CM

## 2020-10-29 DIAGNOSIS — C34.90 NON-SMALL CELL LUNG CANCER, UNSPECIFIED LATERALITY (HCC): Primary | ICD-10-CM

## 2020-10-29 DIAGNOSIS — D63.1 ANEMIA, CHRONIC RENAL FAILURE, STAGE 3 (MODERATE) (HCC): ICD-10-CM

## 2020-10-29 DIAGNOSIS — N18.30 ANEMIA, CHRONIC RENAL FAILURE, STAGE 3 (MODERATE) (HCC): ICD-10-CM

## 2020-10-29 DIAGNOSIS — C34.11 MALIGNANT NEOPLASM OF UPPER LOBE, RIGHT BRONCHUS OR LUNG (HCC): ICD-10-CM

## 2020-10-29 LAB
BASOPHILS ABSOLUTE: 0.04 K/UL (ref 0.01–0.08)
BASOPHILS RELATIVE PERCENT: 0.6 % (ref 0.1–1.2)
EOSINOPHILS ABSOLUTE: 0 K/UL (ref 0.04–0.54)
EOSINOPHILS RELATIVE PERCENT: 0 % (ref 0.7–7)
HCT VFR BLD CALC: 30.1 % (ref 40.1–51)
HEMOGLOBIN: 9.7 G/DL (ref 13.7–17.5)
LYMPHOCYTES ABSOLUTE: 0.43 K/UL (ref 1.18–3.74)
LYMPHOCYTES RELATIVE PERCENT: 6.8 % (ref 19.3–53.1)
MCH RBC QN AUTO: 35.4 PG (ref 25.7–32.2)
MCHC RBC AUTO-ENTMCNC: 32.2 G/DL (ref 32.3–36.5)
MCV RBC AUTO: 109.9 FL (ref 79–92.2)
MONOCYTES ABSOLUTE: 0.85 K/UL (ref 0.24–0.82)
MONOCYTES RELATIVE PERCENT: 13.3 % (ref 4.7–12.5)
NEUTROPHILS ABSOLUTE: 5.05 K/UL (ref 1.56–6.13)
NEUTROPHILS RELATIVE PERCENT: 79.3 % (ref 34–71.1)
PDW BLD-RTO: 22.4 % (ref 11.6–14.4)
PLATELET # BLD: 265 K/UL (ref 163–337)
PMV BLD AUTO: 9.8 FL (ref 7.4–10.4)
RBC # BLD: 2.74 M/UL (ref 4.63–6.08)
WBC # BLD: 6.37 K/UL (ref 4.23–9.07)

## 2020-10-29 PROCEDURE — 84443 ASSAY THYROID STIM HORMONE: CPT

## 2020-10-29 PROCEDURE — 96417 CHEMO IV INFUS EACH ADDL SEQ: CPT

## 2020-10-29 PROCEDURE — 80053 COMPREHEN METABOLIC PANEL: CPT

## 2020-10-29 PROCEDURE — 96372 THER/PROPH/DIAG INJ SC/IM: CPT

## 2020-10-29 PROCEDURE — 85025 COMPLETE CBC W/AUTO DIFF WBC: CPT

## 2020-10-29 PROCEDURE — 96411 CHEMO IV PUSH ADDL DRUG: CPT

## 2020-10-29 PROCEDURE — 2580000003 HC RX 258: Performed by: PHYSICIAN ASSISTANT

## 2020-10-29 PROCEDURE — 6360000002 HC RX W HCPCS: Performed by: PHYSICIAN ASSISTANT

## 2020-10-29 PROCEDURE — 96413 CHEMO IV INFUSION 1 HR: CPT

## 2020-10-29 PROCEDURE — 96375 TX/PRO/DX INJ NEW DRUG ADDON: CPT

## 2020-10-29 PROCEDURE — 71046 X-RAY EXAM CHEST 2 VIEWS: CPT

## 2020-10-29 RX ORDER — HEPARIN SODIUM (PORCINE) LOCK FLUSH IV SOLN 100 UNIT/ML 100 UNIT/ML
500 SOLUTION INTRAVENOUS PRN
Status: DISCONTINUED | OUTPATIENT
Start: 2020-10-29 | End: 2020-10-31 | Stop reason: HOSPADM

## 2020-10-29 RX ORDER — CYANOCOBALAMIN 1000 UG/ML
1000 INJECTION INTRAMUSCULAR; SUBCUTANEOUS ONCE
Status: COMPLETED | OUTPATIENT
Start: 2020-10-29 | End: 2020-10-29

## 2020-10-29 RX ORDER — SODIUM CHLORIDE 0.9 % (FLUSH) 0.9 %
10 SYRINGE (ML) INJECTION PRN
Status: DISCONTINUED | OUTPATIENT
Start: 2020-10-29 | End: 2020-10-30 | Stop reason: HOSPADM

## 2020-10-29 RX ORDER — PALONOSETRON 0.05 MG/ML
0.25 INJECTION, SOLUTION INTRAVENOUS ONCE
Status: COMPLETED | OUTPATIENT
Start: 2020-10-29 | End: 2020-10-29

## 2020-10-29 RX ADMIN — EPOETIN ALFA-EPBX 20000 UNITS: 10000 INJECTION, SOLUTION INTRAVENOUS; SUBCUTANEOUS at 11:23

## 2020-10-29 RX ADMIN — HEPARIN 500 UNITS: 100 SYRINGE at 11:23

## 2020-10-29 RX ADMIN — CYANOCOBALAMIN 1000 MCG: 1000 INJECTION, SOLUTION INTRAMUSCULAR; SUBCUTANEOUS at 11:23

## 2020-10-29 RX ADMIN — SODIUM CHLORIDE, PRESERVATIVE FREE 10 ML: 5 INJECTION INTRAVENOUS at 11:23

## 2020-10-29 RX ADMIN — SODIUM CHLORIDE 520 MG: 9 INJECTION, SOLUTION INTRAVENOUS at 11:07

## 2020-10-29 RX ADMIN — SODIUM CHLORIDE 200 MG: 9 INJECTION, SOLUTION INTRAVENOUS at 10:34

## 2020-10-29 RX ADMIN — PALONOSETRON 0.25 MG: 0.05 INJECTION, SOLUTION INTRAVENOUS at 10:18

## 2020-10-29 ASSESSMENT — PAIN SCALES - GENERAL: PAINLEVEL_OUTOF10: 0

## 2020-10-29 NOTE — PROGRESS NOTES
Beverly Hayden is more SOB today and his O2 sat was 81% immediately after walking from the waiting room to the treatment room. I took about 2 min for it to raise up to 91% on room air. CXR reviewed by BOBBY Correa. OK to give chemotherapy. Offered to order home O2 but Beverly Hayden would like to hold off for now.

## 2020-10-30 LAB
ALBUMIN SERPL-MCNC: 3.1 G/DL (ref 3.5–5.2)
ALP BLD-CCNC: 117 U/L (ref 40–130)
ALT SERPL-CCNC: 20 U/L (ref 21–72)
ANION GAP SERPL CALCULATED.3IONS-SCNC: 9 MMOL/L (ref 7–19)
AST SERPL-CCNC: 41 U/L (ref 17–59)
BILIRUB SERPL-MCNC: <0.2 MG/DL (ref 0.2–1.3)
BUN BLDV-MCNC: 20 MG/DL (ref 9–20)
CALCIUM SERPL-MCNC: 9.2 MG/DL (ref 8.4–10.2)
CHLORIDE BLD-SCNC: 103 MMOL/L (ref 98–111)
CO2: 25 MMOL/L (ref 22–29)
CREAT SERPL-MCNC: 1.8 MG/DL (ref 0.6–1.2)
GFR NON-AFRICAN AMERICAN: 37
GLUCOSE BLD-MCNC: 166 MG/DL (ref 74–106)
POTASSIUM SERPL-SCNC: 5.1 MMOL/L (ref 3.5–5.1)
SODIUM BLD-SCNC: 137 MMOL/L (ref 137–145)
TOTAL PROTEIN: 6.6 G/DL (ref 6.3–8.2)
TSH SERPL DL<=0.05 MIU/L-ACNC: 1.7 UIU/ML (ref 0.47–4.68)

## 2020-11-05 ENCOUNTER — HOSPITAL ENCOUNTER (OUTPATIENT)
Dept: INFUSION THERAPY | Age: 80
Discharge: HOME | End: 2020-11-05
Payer: MEDICARE

## 2020-11-05 VITALS
HEIGHT: 64 IN | TEMPERATURE: 97.5 F | WEIGHT: 149.4 LBS | SYSTOLIC BLOOD PRESSURE: 120 MMHG | BODY MASS INDEX: 25.51 KG/M2 | OXYGEN SATURATION: 95 % | DIASTOLIC BLOOD PRESSURE: 60 MMHG | HEART RATE: 102 BPM

## 2020-11-05 DIAGNOSIS — N18.4 CHRONIC KIDNEY DISEASE, STAGE IV (SEVERE) (HCC): ICD-10-CM

## 2020-11-05 DIAGNOSIS — N18.30 ANEMIA, CHRONIC RENAL FAILURE, STAGE 3 (MODERATE) (HCC): ICD-10-CM

## 2020-11-05 DIAGNOSIS — D63.1 ANEMIA, CHRONIC RENAL FAILURE, STAGE 3 (MODERATE) (HCC): ICD-10-CM

## 2020-11-05 DIAGNOSIS — C80.1 MALIGNANT NEOPLASM (HCC): ICD-10-CM

## 2020-11-05 DIAGNOSIS — C34.90 NON-SMALL CELL LUNG CANCER, UNSPECIFIED LATERALITY (HCC): Primary | ICD-10-CM

## 2020-11-05 PROBLEM — R09.02 HYPOXIA: Status: ACTIVE | Noted: 2020-11-05

## 2020-11-05 PROBLEM — R09.02 HYPOXIA: Chronic | Status: ACTIVE | Noted: 2020-11-05

## 2020-11-05 LAB
BASOPHILS ABSOLUTE: 0.01 K/UL (ref 0.01–0.08)
BASOPHILS RELATIVE PERCENT: 0.3 % (ref 0.1–1.2)
EOSINOPHILS ABSOLUTE: 0.03 K/UL (ref 0.04–0.54)
EOSINOPHILS RELATIVE PERCENT: 0.9 % (ref 0.7–7)
HCT VFR BLD CALC: 31.6 % (ref 40.1–51)
HEMOGLOBIN: 9.4 G/DL (ref 13.7–17.5)
LYMPHOCYTES ABSOLUTE: 0.7 K/UL (ref 1.18–3.74)
LYMPHOCYTES RELATIVE PERCENT: 20.2 % (ref 19.3–53.1)
MCH RBC QN AUTO: 35.7 PG (ref 25.7–32.2)
MCHC RBC AUTO-ENTMCNC: 29.7 G/DL (ref 32.3–36.5)
MCV RBC AUTO: 120.2 FL (ref 79–92.2)
MONOCYTES ABSOLUTE: 0.11 K/UL (ref 0.24–0.82)
MONOCYTES RELATIVE PERCENT: 3.2 % (ref 4.7–12.5)
NEUTROPHILS ABSOLUTE: 2.61 K/UL (ref 1.56–6.13)
NEUTROPHILS RELATIVE PERCENT: 75.4 % (ref 34–71.1)
PDW BLD-RTO: 22 % (ref 11.6–14.4)
PLATELET # BLD: 154 K/UL (ref 163–337)
PMV BLD AUTO: 10.6 FL (ref 7.4–10.4)
RBC # BLD: 2.63 M/UL (ref 4.63–6.08)
WBC # BLD: 3.46 K/UL (ref 4.23–9.07)

## 2020-11-05 PROCEDURE — 96372 THER/PROPH/DIAG INJ SC/IM: CPT

## 2020-11-05 PROCEDURE — 85025 COMPLETE CBC W/AUTO DIFF WBC: CPT

## 2020-11-05 PROCEDURE — 6360000002 HC RX W HCPCS: Performed by: INTERNAL MEDICINE

## 2020-11-05 RX ADMIN — EPOETIN ALFA-EPBX 20000 UNITS: 10000 INJECTION, SOLUTION INTRAVENOUS; SUBCUTANEOUS at 14:37

## 2020-11-09 ENCOUNTER — APPOINTMENT (OUTPATIENT)
Dept: CT IMAGING | Facility: HOSPITAL | Age: 80
End: 2020-11-09

## 2020-11-12 ENCOUNTER — HOSPITAL ENCOUNTER (OUTPATIENT)
Dept: CT IMAGING | Facility: HOSPITAL | Age: 80
Discharge: HOME | End: 2020-11-12
Admitting: INTERNAL MEDICINE

## 2020-11-12 ENCOUNTER — HOSPITAL ENCOUNTER (OUTPATIENT)
Dept: INFUSION THERAPY | Age: 80
Discharge: HOME | End: 2020-11-12
Payer: MEDICARE

## 2020-11-12 VITALS
BODY MASS INDEX: 25.61 KG/M2 | DIASTOLIC BLOOD PRESSURE: 62 MMHG | HEART RATE: 101 BPM | SYSTOLIC BLOOD PRESSURE: 112 MMHG | OXYGEN SATURATION: 90 % | TEMPERATURE: 98.2 F | WEIGHT: 150 LBS | HEIGHT: 64 IN

## 2020-11-12 DIAGNOSIS — D63.1 ANEMIA, CHRONIC RENAL FAILURE, STAGE 3 (MODERATE) (HCC): ICD-10-CM

## 2020-11-12 DIAGNOSIS — C34.90 NON-SMALL CELL LUNG CANCER, UNSPECIFIED LATERALITY (HCC): Primary | ICD-10-CM

## 2020-11-12 DIAGNOSIS — C34.90 SMALL CELL LUNG CANCER (HCC): ICD-10-CM

## 2020-11-12 DIAGNOSIS — N18.30 ANEMIA, CHRONIC RENAL FAILURE, STAGE 3 (MODERATE) (HCC): ICD-10-CM

## 2020-11-12 DIAGNOSIS — C80.1 MALIGNANT NEOPLASM (HCC): ICD-10-CM

## 2020-11-12 DIAGNOSIS — N18.4 CHRONIC KIDNEY DISEASE, STAGE IV (SEVERE) (HCC): ICD-10-CM

## 2020-11-12 LAB
BASOPHILS ABSOLUTE: 0.01 K/UL (ref 0.01–0.08)
BASOPHILS RELATIVE PERCENT: 0.3 % (ref 0.1–1.2)
EOSINOPHILS ABSOLUTE: 0.01 K/UL (ref 0.04–0.54)
EOSINOPHILS RELATIVE PERCENT: 0.3 % (ref 0.7–7)
HCT VFR BLD CALC: 26.3 % (ref 40.1–51)
HEMOGLOBIN: 8.3 G/DL (ref 13.7–17.5)
LYMPHOCYTES ABSOLUTE: 0.63 K/UL (ref 1.18–3.74)
LYMPHOCYTES RELATIVE PERCENT: 19.8 % (ref 19.3–53.1)
MCH RBC QN AUTO: 35.6 PG (ref 25.7–32.2)
MCHC RBC AUTO-ENTMCNC: 31.6 G/DL (ref 32.3–36.5)
MCV RBC AUTO: 112.9 FL (ref 79–92.2)
MONOCYTES ABSOLUTE: 0.69 K/UL (ref 0.24–0.82)
MONOCYTES RELATIVE PERCENT: 21.7 % (ref 4.7–12.5)
NEUTROPHILS ABSOLUTE: 1.84 K/UL (ref 1.56–6.13)
NEUTROPHILS RELATIVE PERCENT: 57.9 % (ref 34–71.1)
PDW BLD-RTO: 21.6 % (ref 11.6–14.4)
PLATELET # BLD: 52 K/UL (ref 163–337)
PMV BLD AUTO: 11.4 FL (ref 7.4–10.4)
RBC # BLD: 2.33 M/UL (ref 4.63–6.08)
WBC # BLD: 3.18 K/UL (ref 4.23–9.07)

## 2020-11-12 PROCEDURE — 96372 THER/PROPH/DIAG INJ SC/IM: CPT

## 2020-11-12 PROCEDURE — 85025 COMPLETE CBC W/AUTO DIFF WBC: CPT

## 2020-11-12 PROCEDURE — 74176 CT ABD & PELVIS W/O CONTRAST: CPT

## 2020-11-12 PROCEDURE — 71250 CT THORAX DX C-: CPT

## 2020-11-12 PROCEDURE — 6360000002 HC RX W HCPCS: Performed by: INTERNAL MEDICINE

## 2020-11-12 RX ORDER — PREDNISONE 20 MG/1
20 TABLET ORAL SEE ADMIN INSTRUCTIONS
Qty: 49 TABLET | Refills: 0 | Status: SHIPPED | OUTPATIENT
Start: 2020-11-12 | End: 2020-12-03

## 2020-11-12 RX ADMIN — EPOETIN ALFA-EPBX 20000 UNITS: 10000 INJECTION, SOLUTION INTRAVENOUS; SUBCUTANEOUS at 11:42

## 2020-11-12 ASSESSMENT — PAIN SCALES - GENERAL: PAINLEVEL_OUTOF10: 0

## 2020-11-12 NOTE — PROGRESS NOTES
Quique Michael is here today for a Retacrit injection. He is still very SOB. I went over his CT results with Dr. Harwood Ganser from this morning and we will hold chemotheraapy for 3 weeks and start Prednisone to see if this helps him.

## 2020-11-19 ENCOUNTER — APPOINTMENT (OUTPATIENT)
Dept: INFUSION THERAPY | Age: 80
End: 2020-11-19
Payer: MEDICARE

## 2020-11-19 ENCOUNTER — HOSPITAL ENCOUNTER (OUTPATIENT)
Dept: INFUSION THERAPY | Age: 80
Discharge: HOME | End: 2020-11-19
Payer: MEDICARE

## 2020-11-19 VITALS
SYSTOLIC BLOOD PRESSURE: 120 MMHG | HEIGHT: 64 IN | DIASTOLIC BLOOD PRESSURE: 62 MMHG | TEMPERATURE: 97.5 F | HEART RATE: 104 BPM | BODY MASS INDEX: 26.87 KG/M2 | OXYGEN SATURATION: 93 % | WEIGHT: 157.4 LBS

## 2020-11-19 DIAGNOSIS — N18.30 ANEMIA, CHRONIC RENAL FAILURE, STAGE 3 (MODERATE) (HCC): ICD-10-CM

## 2020-11-19 DIAGNOSIS — C34.90 NON-SMALL CELL LUNG CANCER, UNSPECIFIED LATERALITY (HCC): Primary | ICD-10-CM

## 2020-11-19 DIAGNOSIS — N18.4 CHRONIC KIDNEY DISEASE, STAGE IV (SEVERE) (HCC): ICD-10-CM

## 2020-11-19 DIAGNOSIS — D63.1 ANEMIA, CHRONIC RENAL FAILURE, STAGE 3 (MODERATE) (HCC): ICD-10-CM

## 2020-11-19 DIAGNOSIS — C80.1 MALIGNANT NEOPLASM (HCC): ICD-10-CM

## 2020-11-19 LAB
BASOPHILS ABSOLUTE: 0.07 K/UL (ref 0.01–0.08)
BASOPHILS RELATIVE PERCENT: 0.6 % (ref 0.1–1.2)
EOSINOPHILS ABSOLUTE: 0.01 K/UL (ref 0.04–0.54)
EOSINOPHILS RELATIVE PERCENT: 0.1 % (ref 0.7–7)
HCT VFR BLD CALC: 34.7 % (ref 40.1–51)
HEMOGLOBIN: 10.2 G/DL (ref 13.7–17.5)
LYMPHOCYTES ABSOLUTE: 0.51 K/UL (ref 1.18–3.74)
LYMPHOCYTES RELATIVE PERCENT: 4.6 % (ref 19.3–53.1)
MCH RBC QN AUTO: 36.4 PG (ref 25.7–32.2)
MCHC RBC AUTO-ENTMCNC: 29.4 G/DL (ref 32.3–36.5)
MCV RBC AUTO: 123.9 FL (ref 79–92.2)
MONOCYTES ABSOLUTE: 1.37 K/UL (ref 0.24–0.82)
MONOCYTES RELATIVE PERCENT: 12.5 % (ref 4.7–12.5)
NEUTROPHILS ABSOLUTE: 9.03 K/UL (ref 1.56–6.13)
NEUTROPHILS RELATIVE PERCENT: 82.2 % (ref 34–71.1)
PDW BLD-RTO: 25.5 % (ref 11.6–14.4)
PLATELET # BLD: 200 K/UL (ref 163–337)
PMV BLD AUTO: 10.9 FL (ref 7.4–10.4)
RBC # BLD: 2.8 M/UL (ref 4.63–6.08)
WBC # BLD: 10.99 K/UL (ref 4.23–9.07)

## 2020-11-19 PROCEDURE — 6360000002 HC RX W HCPCS: Performed by: INTERNAL MEDICINE

## 2020-11-19 PROCEDURE — 96372 THER/PROPH/DIAG INJ SC/IM: CPT

## 2020-11-19 PROCEDURE — 85025 COMPLETE CBC W/AUTO DIFF WBC: CPT

## 2020-11-19 RX ADMIN — EPOETIN ALFA-EPBX 20000 UNITS: 10000 INJECTION, SOLUTION INTRAVENOUS; SUBCUTANEOUS at 13:38

## 2020-12-02 RX ORDER — SODIUM CHLORIDE 0.9 % (FLUSH) 0.9 %
5 SYRINGE (ML) INJECTION PRN
Status: CANCELLED | OUTPATIENT
Start: 2020-12-03

## 2020-12-02 RX ORDER — CYANOCOBALAMIN 1000 UG/ML
1000 INJECTION INTRAMUSCULAR; SUBCUTANEOUS ONCE
Status: CANCELLED | OUTPATIENT
Start: 2020-12-03

## 2020-12-02 RX ORDER — HEPARIN SODIUM (PORCINE) LOCK FLUSH IV SOLN 100 UNIT/ML 100 UNIT/ML
500 SOLUTION INTRAVENOUS PRN
Status: CANCELLED | OUTPATIENT
Start: 2020-12-03

## 2020-12-02 RX ORDER — EPINEPHRINE 1 MG/ML
0.3 INJECTION, SOLUTION, CONCENTRATE INTRAVENOUS PRN
Status: CANCELLED | OUTPATIENT
Start: 2020-12-03

## 2020-12-02 RX ORDER — PALONOSETRON 0.05 MG/ML
0.25 INJECTION, SOLUTION INTRAVENOUS ONCE
Status: CANCELLED | OUTPATIENT
Start: 2020-12-03

## 2020-12-02 RX ORDER — METHYLPREDNISOLONE SODIUM SUCCINATE 125 MG/2ML
125 INJECTION, POWDER, LYOPHILIZED, FOR SOLUTION INTRAMUSCULAR; INTRAVENOUS PRN
Status: CANCELLED | OUTPATIENT
Start: 2020-12-03

## 2020-12-02 RX ORDER — DIPHENHYDRAMINE HYDROCHLORIDE 50 MG/ML
50 INJECTION INTRAMUSCULAR; INTRAVENOUS PRN
Status: CANCELLED | OUTPATIENT
Start: 2020-12-03

## 2020-12-02 RX ORDER — SODIUM CHLORIDE 0.9 % (FLUSH) 0.9 %
10 SYRINGE (ML) INJECTION PRN
Status: CANCELLED | OUTPATIENT
Start: 2020-12-03

## 2020-12-02 RX ORDER — SODIUM CHLORIDE 9 MG/ML
20 INJECTION, SOLUTION INTRAVENOUS ONCE
Status: CANCELLED | OUTPATIENT
Start: 2020-12-03

## 2020-12-03 ENCOUNTER — HOSPITAL ENCOUNTER (OUTPATIENT)
Dept: INFUSION THERAPY | Age: 80
Discharge: HOME | End: 2020-12-03
Payer: MEDICARE

## 2020-12-03 ENCOUNTER — OFFICE VISIT (OUTPATIENT)
Dept: HEMATOLOGY | Age: 80
End: 2020-12-03
Payer: MEDICARE

## 2020-12-03 VITALS
HEART RATE: 96 BPM | TEMPERATURE: 97.1 F | DIASTOLIC BLOOD PRESSURE: 82 MMHG | HEIGHT: 64 IN | BODY MASS INDEX: 27.14 KG/M2 | OXYGEN SATURATION: 91 % | SYSTOLIC BLOOD PRESSURE: 142 MMHG | WEIGHT: 159 LBS

## 2020-12-03 DIAGNOSIS — C78.89 SECONDARY MALIGNANT NEOPLASM OF OTHER DIGESTIVE ORGANS (HCC): ICD-10-CM

## 2020-12-03 DIAGNOSIS — C34.90 NON-SMALL CELL LUNG CANCER, UNSPECIFIED LATERALITY (HCC): Primary | ICD-10-CM

## 2020-12-03 DIAGNOSIS — N18.30 ANEMIA, CHRONIC RENAL FAILURE, STAGE 3 (MODERATE) (HCC): ICD-10-CM

## 2020-12-03 DIAGNOSIS — D63.1 ANEMIA, CHRONIC RENAL FAILURE, STAGE 3 (MODERATE) (HCC): ICD-10-CM

## 2020-12-03 LAB
ALBUMIN SERPL-MCNC: 3.1 G/DL (ref 3.5–5.2)
ALP BLD-CCNC: 125 U/L (ref 40–130)
ALT SERPL-CCNC: 26 U/L (ref 21–72)
ANION GAP SERPL CALCULATED.3IONS-SCNC: 8 MMOL/L (ref 7–19)
AST SERPL-CCNC: 40 U/L (ref 17–59)
BASOPHILS ABSOLUTE: 0.03 K/UL (ref 0.01–0.08)
BASOPHILS RELATIVE PERCENT: 0.2 % (ref 0.1–1.2)
BILIRUB SERPL-MCNC: 0.6 MG/DL (ref 0.2–1.3)
BUN BLDV-MCNC: 23 MG/DL (ref 9–20)
CALCIUM SERPL-MCNC: 9.8 MG/DL (ref 8.4–10.2)
CHLORIDE BLD-SCNC: 103 MMOL/L (ref 98–111)
CO2: 29 MMOL/L (ref 22–29)
CREAT SERPL-MCNC: 1.5 MG/DL (ref 0.6–1.2)
EOSINOPHILS ABSOLUTE: 0.02 K/UL (ref 0.04–0.54)
EOSINOPHILS RELATIVE PERCENT: 0.1 % (ref 0.7–7)
GFR NON-AFRICAN AMERICAN: 45
GLOBULIN: 3.3 G/DL
GLUCOSE BLD-MCNC: 88 MG/DL (ref 74–106)
HCT VFR BLD CALC: 35.9 % (ref 40.1–51)
HEMOGLOBIN: 11.9 G/DL (ref 13.7–17.5)
LYMPHOCYTES ABSOLUTE: 0.5 K/UL (ref 1.18–3.74)
LYMPHOCYTES RELATIVE PERCENT: 3.7 % (ref 19.3–53.1)
MCH RBC QN AUTO: 36.6 PG (ref 25.7–32.2)
MCHC RBC AUTO-ENTMCNC: 33.1 G/DL (ref 32.3–36.5)
MCV RBC AUTO: 110.5 FL (ref 79–92.2)
MONOCYTES ABSOLUTE: 0.91 K/UL (ref 0.24–0.82)
MONOCYTES RELATIVE PERCENT: 6.7 % (ref 4.7–12.5)
NEUTROPHILS ABSOLUTE: 12.15 K/UL (ref 1.56–6.13)
NEUTROPHILS RELATIVE PERCENT: 89.3 % (ref 34–71.1)
PDW BLD-RTO: 19.3 % (ref 11.6–14.4)
PLATELET # BLD: 102 K/UL (ref 163–337)
PMV BLD AUTO: 11.9 FL (ref 7.4–10.4)
POTASSIUM SERPL-SCNC: 4.4 MMOL/L (ref 3.5–5.1)
RBC # BLD: 3.25 M/UL (ref 4.63–6.08)
SODIUM BLD-SCNC: 140 MMOL/L (ref 137–145)
TOTAL PROTEIN: 6.4 G/DL (ref 6.3–8.2)
WBC # BLD: 13.61 K/UL (ref 4.23–9.07)

## 2020-12-03 PROCEDURE — G8417 CALC BMI ABV UP PARAM F/U: HCPCS | Performed by: INTERNAL MEDICINE

## 2020-12-03 PROCEDURE — 1123F ACP DISCUSS/DSCN MKR DOCD: CPT | Performed by: INTERNAL MEDICINE

## 2020-12-03 PROCEDURE — 85025 COMPLETE CBC W/AUTO DIFF WBC: CPT

## 2020-12-03 PROCEDURE — G8484 FLU IMMUNIZE NO ADMIN: HCPCS | Performed by: INTERNAL MEDICINE

## 2020-12-03 PROCEDURE — 6360000002 HC RX W HCPCS: Performed by: INTERNAL MEDICINE

## 2020-12-03 PROCEDURE — 80053 COMPREHEN METABOLIC PANEL: CPT

## 2020-12-03 PROCEDURE — G8427 DOCREV CUR MEDS BY ELIG CLIN: HCPCS | Performed by: INTERNAL MEDICINE

## 2020-12-03 PROCEDURE — 99214 OFFICE O/P EST MOD 30 MIN: CPT | Performed by: INTERNAL MEDICINE

## 2020-12-03 PROCEDURE — 96523 IRRIG DRUG DELIVERY DEVICE: CPT

## 2020-12-03 PROCEDURE — 4040F PNEUMOC VAC/ADMIN/RCVD: CPT | Performed by: INTERNAL MEDICINE

## 2020-12-03 PROCEDURE — 2580000003 HC RX 258: Performed by: INTERNAL MEDICINE

## 2020-12-03 PROCEDURE — 1036F TOBACCO NON-USER: CPT | Performed by: INTERNAL MEDICINE

## 2020-12-03 RX ORDER — SODIUM CHLORIDE 0.9 % (FLUSH) 0.9 %
20 SYRINGE (ML) INJECTION PRN
Status: DISCONTINUED | OUTPATIENT
Start: 2020-12-03 | End: 2020-12-04 | Stop reason: HOSPADM

## 2020-12-03 RX ORDER — SODIUM CHLORIDE 0.9 % (FLUSH) 0.9 %
10 SYRINGE (ML) INJECTION PRN
Status: CANCELLED | OUTPATIENT
Start: 2020-12-03

## 2020-12-03 RX ORDER — SODIUM CHLORIDE 0.9 % (FLUSH) 0.9 %
20 SYRINGE (ML) INJECTION PRN
Status: CANCELLED | OUTPATIENT
Start: 2020-12-03

## 2020-12-03 RX ORDER — HEPARIN SODIUM (PORCINE) LOCK FLUSH IV SOLN 100 UNIT/ML 100 UNIT/ML
500 SOLUTION INTRAVENOUS PRN
Status: DISCONTINUED | OUTPATIENT
Start: 2020-12-03 | End: 2020-12-04 | Stop reason: HOSPADM

## 2020-12-03 RX ORDER — HEPARIN SODIUM (PORCINE) LOCK FLUSH IV SOLN 100 UNIT/ML 100 UNIT/ML
500 SOLUTION INTRAVENOUS PRN
Status: CANCELLED | OUTPATIENT
Start: 2020-12-03

## 2020-12-03 RX ADMIN — SODIUM CHLORIDE, PRESERVATIVE FREE 20 ML: 5 INJECTION INTRAVENOUS at 12:15

## 2020-12-03 RX ADMIN — HEPARIN 500 UNITS: 100 SYRINGE at 12:15

## 2020-12-03 NOTE — PROGRESS NOTES
7/16/2020 and documented:  · The aortic caval node described on the comparison study is not significantly changed in size when measured at the same location. · Nonobstructing renal calculus on the LEFT. No change in the indeterminate renal lesions on the LEFT, likely cysts. · Nonspecific free fluid in the pelvis, new since the prior study and there is some mild nonspecific mesenteric haziness    Cycle #26 11/12/2020 but held due to issues of dyspnea and shortness of breath. A course of prednisone was initiated and further chemotherapy was held, unable to give Keytruda while on steroids. Mr. 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 He denied respiratory symptoms of shortness of air, cough or productive sputum.     A CT scan of the chest on 9/12/2018 had been ordered by Dr. Michael Valverde due to weight loss and identified a new lobulated right upper lobe 5.7 cm mass that extended back to the right hilum. Numerous mediastinal lymph nodes ranging in size from mm to 1.3 cm and a subcarnial lymph node that measured 1.5 x 1.5 x 3.5 cm.     A CT scan of the abdomen and pelvis with contrast on 9/12/2018 documented a 4.9 x 4 x 4 cm soft tissue mass with peripheral calcification immediately adjacent to the gallbladder in the head of the pancreas area.     An MRI of the abdomen and pelvis W & WO on 10/11/2018 identified in the 4.1 x 3.4 cm soft tissue mass in the subhepatic space, abutting the second portion of the duodenum with mild displacement of the duodenum. There does not appear to be involvement of the pancreas, and the pancreas appears within normal limits.   Differential diagnoses include a navigational protocol, endobronchial ultrasound and biopsy of the RUL of the lung mass along with multiple lymph node station Biopsies.   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 · All lymph nodes sampled were NEGATIVE for malignant cells including stations 10L, 4L, station 7, 10R, 4R.   · IHC studies were positive for CK7, TTF-1 and Napsin A.   · IHC studies on tumor cells were negative for CDX2, CK5/6, and p63   · Further lung profile molecular testing is pending     All of the above was discussed at length with Mr. Kody Sierra at his 12/13/2018 clinic visit. He was agreeable for referral for consideration of resection of the lung lesion.      He is comfortable with and would like a referral to Dr. Angel Plasencia ([phone]) at [address], [address] [place] [address]. Logistics, however, are planning a big part in his decision making and he may decide to have surgery done in the [place] area.     If he decides to have surgery in the [place], referral will be made to Dr. Freddie Alcantara.      CT chest with contrast 2/18/2019 at [facility] to 9/12/2018 revealed a 4.9 x 3.5 cm spiculated RUL lung mass which actually decreased in size from a prior value of 6.1 x 3.6 cm. Subhepatic mass adjacent to the descending duodenum had increased in size significantly to 4.3 x 9 cm compared to 4.1 x 3.4 cm on the 10/11/18 MRI of the abdomen.     CT of the abdomen and pelvis without contrast on 3/20/2019 at [facility] was compared to outpatient CT data and pelvis on 9/12/2018 an MRI of the abdomen from 10/11/2018. A soft tissue mass was again encountered in the subhepatic space which abutted the distal stomach and proximal duodenum measuring 8.9 x 5.4 cm which has increased considerably from a prior measurement of 4.1 x 3.4 cm.  Medial to the left renal hilum a 3.5 cm lobular mass causing some mild left-sided hydronephrosis.     Mr. Mirna Pressley was referred to Dr. Francois Otoole who saw him on 1/22/2019 anticipating plans for a curative resection.      Dr. Marilyn Ascencio received a phone call on 2/20/2019 from Dr. Nilda Cadet , indicating that the previously documented an abdominal pancreatic area mass had doubled in size and was causing compression into the left kidney/renal pelvis producing a hydroureter.      Dr. Nilda Cadet arranged a referral to Dr. Ban Noland who will be placing a stent 3/8/2019.     Mr. Mirna Pressley was scheduled to be seen by gastroenterology at [facility] on 3/13/2019 for EGD/EUS assessment and biopsy of the enlarging peripancreatic/duodenal area mass. With a decrease in the lung mass and increased size of the subhepatic mass-surgical resection was abandoned at present pending further evaluation of the subhepatic mass. Dr. Marilyn Ascencio discussed treatment options with the unresectable lung mass and the per he pancreatic mass and recommended a combination of Keytruda, Alimta, carboplatin. He was subsequently [facility] 4/26 - 4/30/2019 with abdominal pain and melena. WBC fantasma was 0.75 with ANC 0.34. PLT did drop to 24,000. He did have duodenal ulcerations that were bleeding on endoscopy. He was stabilized but then readmitted from 5/8 - 5/10/2019 with recurrent melanoma. A repeat endoscopy was performed. It was felt that exacerbation of his bleeding from the duodenal came about by his thrombocytopenia from treatment. Subsequent dosing was adjusted and Neulasta was added.     CT chest without contrast at [facility] on 6/27/2019 was compared to 2/18/2019 revealed that the right lung mass that extended into the right hilum has decreased from 5.2 x 3.5 down to 4.6 x 3.6. There is increased central cavitation in the mass consistent with tumor necrosis.  Mediastinal lymphadenopathy is relatively stable.     CT abdomen and pelvis without contrast at [facility] on 6/27/2019 was compared to 4/26/2019 revealed complete resolution of the previously identified. Duodenal/subhepatic space soft tissue mass. The previously identified heterogenous enhancing lesion in the left renal pelvis was much less prominent but there does continue to be some mild left caliectasis. I reviewed the CT results with Dr. Edwar Kent on 7/5/2019 who then relayed them as well to [facility]. We've had excellent results from this combination of therapy. He will complete the fourth combination therapy of Raphael Larissa   and then go to maintenance Keytruda plus Alimta.     CT chest without contrast at [facility] on 1/9/2020 was compared to 10/17/2019 revealing:   · A stable spiculated RUL nodule/mass with similar mediastinal adenopathy. · Questionable right hilar adenopathy with diminished assessment due to lack of IV contrast.    · Advanced emphysema with severe pulmonary fibrosis. · No new pulmonary nodules.     CT abdomen and pelvis without contrast at [facility] on 1/9/2020 was compared to 10/17/2019 revealing:   · Mildly prominent aortocaval RP lymph nodes with position just below the level of the renal vein worrisome for potential lymphatic metastases. This is similar to 10/17/2019 but increased from 4/26/2019. · Pneumobilia without discrete suspicious focal lesion in the liver. · Wall thickening of the duodenum. · Similar and stable renal lesions favoring cysts.     He has had numerous endoscopies within the past year. While he was having an Endo EUS and had cardiac issues and was actually admitted to the intensive care unit.       CT abdomen and pelvis without contrast on 7/16/2020 at [facility] was compared to 1/9/2020 and documented:  · 1.5 x 0.9 cm aortocaval lymph node, previously 1.9 x 1.2 cm  · No new abnormality seen    CT chest without contrast on 11/12/2020 at [facility] ws compared to 1/9/2020 and documented:  · Mixed response therapy with interval decrease in size in the RIGHT upper lobe pulmonary nodule but increase in size and mediastinal lymph nodes. · In addition, there is severe emphysema with findings of severe pulmonary fibrosis. Interval worsening of interstitial opacities bilaterally as well as suggestion of some pleural nodularity. Lymphangitic spread of malignancy should be considered. · Small pleural effusion on the RIGHT is new     CT abdomen and pelvis without contrast on 11/12/2020 at [facility] was compared to 7/16/2020 and documented:  · The aortic caval node described on the comparison study is not significantly changed in size when measured at the same location. · Nonobstructing renal calculus on the LEFT. No change in the indeterminate renal lesions on the LEFT, likely cysts. · Nonspecific free fluid in the pelvis, new since the prior study and there is some mild nonspecific mesenteric haziness         TREATMENT SUMMARY  1. Keytruda, carboplatin, Alimta initiated 4/18/2019 to be given every 3 weeks for 4 cycles - 4th cycle 7/5/2019, then Keytruda + Alimta as maintenance to continue indefinitely until progression or intolerable side effects              #2 TUMOR HISTORY: Pancreatic mass diagnosed 10/29/03  Mr. Alka Hoover presented in October 2003 with obstructive jaundice. A CT scan of the abdomen on 10/26/2003 documented a 3.2 x 4 x 4.2 cm mass in the head of the pancreas.      On 10/29/03 Dr. Vishnu Ballard performed a resection of a portion of the head of the pancreas on [facility] along with a Mitul-en-Y procedure and a choledochojejunostomy for what was felt to be a pancreatic cancer. His pancreas was bypassed because of the findings. Pathology of the biopsies were negative for malignancy showing a fibrosed pancreas. Mr. Alka Hoover was referred to Dr. Kevon Tiwari in [facility].     Dr. Kevon Tiwari performed ERCP with an EUS and biopsy on 02/26/04   Pathology again was negative for cancer or malignancy.    Routine periodic serologic and radiographic monitoring has not disclosed progression of the mass or development of new malignancy or increase in pancreatic tumor markers.      A CT scan of the abdomen and pelvis with contrast on 9/12/2018 documented a 4.9 x 4 x 4 cm soft tissue mass with peripheral calcification immediately adjacent to the gallbladder in the head of the pancreas area.     An MRI of the abdomen and pelvis W & WO on 10/11/2018 identified in the 4.1 x 3.4 cm soft tissue mass in the subhepatic space, abutting the second portion of the duodenum with mild displacement of the duodenum. There does not appear to be involvement of the pancreas, and the pancreas appears within normal limits. Differential diagnoses include a desmoid tumor, less likely leiomyoma of the wall of the duodenum or malignancy.     CT of the abdomen and pelvis without contrast on 3/20/2019 at [facility] was compared to outpatient CT data and pelvis on 9/12/2018 an MRI of the abdomen from 10/11/2018. A soft tissue mass was again encountered in the subhepatic space which abutted the distal stomach and proximal duodenum measuring 8.9 x 5.4 cm which has increased considerably from a prior measurement of 4.1 x 3.4 cm. Medial to the left renal hilum a 3.5 cm lobular mass causing some mild left-sided hydronephrosis.     Mr. Bobby Arana was scheduled for an EGD/EUS by Dr. Frank Smyth as an outpatient procedure on 3/13/2019 for assessment and biopsy of the enlarging peripancreatic/duodenal area mass. Unfortunately, after initiation of the procedure, he began having ventricular tachycardia and the procedure had to be aborted. Lisseth Richardson was transferred to the ICU for cardiology evaluation and stabilization. He remained hospitalized until 3/18/2019 when he was medically cleared for discharge.     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 No emergent urologic intervention was warranted and he received monitoring of renal function.  He had a creatinine level of 1.9 · Pneumobilia without discrete suspicious focal lesion in the liver. · Wall thickening of the duodenum. · Similar and stable renal lesions favoring cysts.         He has had numerous endoscopies within the past year. About 10 months ago he was having an Endo EUS and had cardiac issues and was actually admitted to the intensive care unit. This area will just be monitored on follow-up scans.           #1 HEMATOLOGICAL HISTORY: IgG kappa MGUS- Dx: 02/23/06. During the course of Mr. Haque Centers follow up, an abnormally elevated protein was noted on one occasion, which led to workup including quantitative immunoglobulins.      An elevated IgG kappa was encountered. This has remained stable in the 2500 range since early 2006.      A bone marrow biopsy and aspirate on 02/23/06 was negative with only 4% plasma cells.      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      24 hour urine for immunoelectrophoresis did not reveal an M-spike. Serology studies on 9/18/2018 documented an elevated, stable IgG of 2589 with an M spike of 0.7. Immunofixation continued to reveal IgG monoclonal protein with kappa light chain specificity.     #2 HEMATOLOGICAL HISTORY: Iron deficiency anemia, 9/18/2018  Lanie Reyes had serology on 9/18/2018 that identified iron deficiency anemia. His lab work was obtained at [facility].  An iron level was 12, iron saturation 7%, ferritin 256  Folate >20, and vitamin B12 1044. Dr. Swapna Bean placed Sophia on ferrous sulfate 325 mg daily on 9/25/2018 , but this failed to resolve the anemia.     An EGD by Dr. Army Rea on 12/11/2018 documented a normal esophagus, normal stomach and a degree of duodenal deformity. Gastric biopsy was urease negative.     Colonoscopy by Dr. Army Rea on 1/9/2019 documented a polyp at 80 cm. Pathology identified a tubular adenoma, negative for high-grade dysplasia.    Feraheme administered.     Labs on 3/13/2019:  · Iron 25   · Iron saturation 22% · TIBC 116   · Ferritin >2000   · Reticulocyte count 0.0578   ·    · Haptoglobin 341   · Folate >20   · Vitamin B12 945   · Erythropoietin 13     He presented to [facility] on 4/26/2019 with melena and abdominal discomfort. He was subsequently admitted     EGD per Dr. Evin Middleton on 4/29/2019 revealed  · LA grade (1 or more mucosal breaks greater than 5 mm, not extending between the tops of the 2 mucosal folds)? esophagitis with no bleeding found in the distal esophagus   · Stomach was normal, biopsies for H. pylori taken. · Cardia and gastric fundus were normal on retroflexion   · One nonbleeding cratered duodenal ulcer with no stigmata of bleeding was found in the duodenal bulb lesion was about 9 mm. · Many nonbleeding cratered, linear and superficial duodenal ulcers with no stigmata of bleeding were found in the second portion of the duodenum. The largest lesion was 6 mm in largest dimension. No mass encountered and anastomosis not seen.     He was admitted to [facility] on 5/8/2019 with melena, hematochezia, anemia, thrombocytopenia     Endoscopy performed by Dr. Nano Eli on 5/9/2019 revealed  · Normal esophagus   · Gastric mucosal variant. 2 erythematous spots in the antrum, ? AVM   · Normal examined duodenum   · Nothing found to account for symptoms   He developed pancytopenia from chemotherapy and did require transfusions. His hemoglobin dropped to 7.3 on 6/25/2019 after his last treatment. He received 2 units packed red blood cells as an outpatient.     Serology 6/13/2019  Serum Fe - 117  TIBC - 587  Fe sat - 19.9%  Ferritin - 1,000  Iron levels have been adequately replaced. 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     TREATMENT SUMMARY  1. Feraheme 510 mg IV on 2/14 and 2/21/19   2.  Venofer 200 mg IV daily 5/8 - 5/10/2019 as IP at [facility]   3. s/p 2 units pRBC on 4/26/19 and 2 units pRBC on 4/29/2019 as IP at [facility]?  s/p 2 units pRBC on?5/8/2019   s/p 2 pheresis plts on 5/8/2019  s/p 2 units pRBC as OP 6/26/2019    Allergies:  Penicillins    Medicines:  Current Outpatient Medications   Medication Sig Dispense Refill    predniSONE (DELTASONE) 20 MG tablet Take 1 tablet by mouth See Admin Instructions for 21 days Take 2 tabs BID x 1 week, then 1 tab BID x 1 week, then 1/2 tab BID until returns on 12/3/20 to see Dr. Mango Yeh 49 tablet 0    SM STOOL SOFTENER/LAXATIVE 8.6-50 MG per tablet Take 2 tablets by mouth twice daily 120 tablet 0    dexamethasone (DECADRON) 4 MG tablet TAKE 1 TABLET BY MOUTH TWICE DAILY DAY  BEFORE  CHEMO,  THE  DAY  OF  CHEMO  AND  THE  DAY  AFTER  --EVERY  21  DAYS 24 tablet 0    folic acid (FOLVITE) 1 MG tablet Take 1 tablet by mouth once daily 30 tablet 5    spironolactone (ALDACTONE) 25 MG tablet Take 25 mg by mouth daily      flecainide (TAMBOCOR) 50 MG tablet Take 50 mg by mouth 2 times daily      sodium bicarbonate 650 MG tablet Take 650 mg by mouth 2 times daily      pantoprazole (PROTONIX) 40 MG tablet Take 40 mg by mouth daily      tamsulosin (FLOMAX) 0.4 MG capsule Take 0.4 mg by mouth daily      metoprolol tartrate (LOPRESSOR) 25 MG tablet Take 0.5 tablets by mouth 2 times daily (Patient taking differently: Take 12.5 mg by mouth daily ) 60 tablet 0    Multiple Vitamin (MULTI VITAMIN DAILY PO) Take by mouth daily        No current facility-administered medications for this visit.       Facility-Administered Medications Ordered in Other Visits   Medication Dose Route Frequency Provider Last Rate Last Dose    sodium chloride flush 0.9 % injection 20 mL  20 mL Intravenous PRN Genevieve Clinton MD   20 mL at 12/03/20 1215    heparin flush 100 UNIT/ML injection 500 Units  500 Units Intracatheter PRN Genevieve Clinton MD   500 Units at 12/03/20 1215       Past Medical History:      Diagnosis Date    Abnormal weight loss     Anemia     Anemia, unspecified     Blind left eye     Cardiomyopathy ([address]     with compensated CHF    Cellulitis     Cellulitis of unspecified part of limb     Chronic kidney disease, stage IV (severe) ([address]     Dr. Poornima Lyle COPD (chronic obstructive pulmonary disease) ([address]     Duodenal ulcer     Essential hypertension 10/2/2017    Eye injury     at age 12 from penetrating injury    Gout     HTN (hypertension)     Hydronephrosis     Hydronephrosis, left     Liver abscess     resolved    Lung nodule     Malignant neoplasm ([address]     Malignant neoplasm of upper lobe, right bronchus or lung (HCC)     MGUS (monoclonal gammopathy of unknown significance)     secondary to an IgG kappa. IgG level in 2,000 range    Monoclonal gammopathy     NICM (nonischemic cardiomyopathy) ([address]     Non-small cell lung cancer ([address] 6/4/2019    Non-sustained ventricular tachycardia (HCC)     NSVT (nonsustained ventricular tachycardia) (HCC)     Osteoarthritis     Other fatigue     Other iron deficiency anemias     Secondary malignant neoplasm of other digestive organs ([address]     Weight loss         Past Surgical History:      Procedure Laterality Date    BRONCHOSCOPY  11/27/2018    dx of adenocarcinoma RUL  Dr. Chuy Rodrgiez CATH LAB PROCEDURE      ENDOSCOPY, COLON, DIAGNOSTIC  03/30/2019    aborted d/t vtach    EYE SURGERY Left     EYE SURGERY  1964    FOOT SURGERY      removal of joint from right toe from drilling accident, [facility]  10/29/2003    with resection  Mitul-en-Y proecedure  DR. Mcfarlane    TUNNELED VENOUS PORT PLACEMENT      UPPER GASTROINTESTINAL ENDOSCOPY N/A 3/13/2019    EGD W/EUS FNA performed by Hemanth Michele MD at [facility]        Family History:      Problem Relation Age of Onset    Osteoporosis Mother     High Blood Pressure Mother     Asthma Mother     Bleeding Prob Father     Cancer Father         leukemia    Asthma Brother         Social History  Social History     Tobacco Use    Smoking status: Former Smoker     Packs/day: 2.00     Years: 40.00     Pack years: 80.00     Types: Cigarettes     Last attempt to quit: 10/29/2003     Years since quittin.1    Smokeless tobacco: Never Used   Substance Use Topics    Alcohol use: No    Drug use: No          Review of Systems:  Constitutional: Negative for chills, fatigue, fever or significant weight loss. HENT: Negative for congestion, hearing loss, nosebleeds or sore throat. Eyes: Negative for photophobia, pain, discharge, redness and visual disturbance. Respiratory: Negative for cough, shortness of breath, or wheezing. Cardiovascular: Negative for chest pain, palpitations or leg swelling. Gastrointestinal: Negative for abdominal pain, blood in stool, constipation, diarrhea, nausea or vomiting. Genitourinary: Negative for dysuria, flank pain, frequency, hematuria or urgency. Musculoskeletal: Negative for back pain, joint swelling, myalgias or neck pain. Skin: Negative for rash or petechiae. Neurological: Negative for tremors, seizures, syncope, weakness or headaches. Hematological: No active bruising or bleeding. Psychiatric/Behavioral: Negative for hallucinations. Objective:  Vital Signs: Blood pressure (!) 142/82, pulse 96, temperature 97.1 °F (36.2 °C), height 5' 4\" (1.626 m), weight 159 lb (72.1 kg), SpO2 91 %. Physical Exam   Constitutional: Oriented to person, place, and time. No acute distress. Head: Normocephalic and atraumatic. Nose: Nose normal.   Mouth/Throat: Oropharynx is clear and moist. No oropharyngeal exudate. Eyes: Pupils are equal and round. Conjunctivae and EOM are normal. No scleral icterus. Neck: Normal range of motion. Neck supple. No JVD. No appreciable thyromegaly. Cardiovascular: Normal rate, regular rhythm, normal heart sounds and intact distal pulses. Exam reveals no gallop, murmurs or friction rub.    Pulmonary/Chest: Effort normal and breath sounds normal. No respiratory distress. No wheezes. Abdominal: Soft. Bowel sounds are normal. No organomegally or masses. No tenderness. There is no rebound and no guarding. Musculoskeletal: Normal range of motion. No edema or tenderness. Lymphadenopathy: No cervical, axillary or inguinal lymphadenopathy. Neurological: Alert and oriented to person, place, and time. Cranial nerves are intact. Neurological exam is nonfocal  Skin: Skin is warm and dry. No rash noted. No erythema. No pallor. Psychiatric: Judgment normal.     Labs:  BMP:   Recent Labs     12/03/20  1047      K 4.4      CO2 29   BUN 23*   CREATININE 1.5*   CALCIUM 9.8     CBC:   Recent Labs     12/03/20  1057   WBC 13.61*   HGB 11.9*   HCT 35.9*   .5*   *     LIVER PROFILE:   Recent Labs     12/03/20  1047   AST 40   ALT 26   BILITOT 0.6   ALKPHOS 125     PT/INR: No results for input(s): PROTIME, INR in the last 72 hours. APTT: No results for input(s): APTT in the last 72 hours. BNP:  No results for input(s): BNP in the last 72 hours. Ionized Calcium:No results for input(s): IONCA in the last 72 hours. Magnesium:No results for input(s): MG in the last 72 hours. Phosphorus:No results for input(s): PHOS in the last 72 hours. HgbA1C: No results for input(s): LABA1C in the last 72 hours. Hepatic:   Recent Labs     12/03/20  1047   ALKPHOS 125   ALT 26   AST 40   PROT 6.4   BILITOT 0.6   LABALBU 3.1*     Lactic Acid: No results for input(s): LACTA in the last 72 hours. Troponin: No results for input(s): CKTOTAL, CKMB, TROPONINT in the last 72 hours. ABGs: No results for input(s): PH, PCO2, PO2, HCO3, O2SAT in the last 72 hours. CRP:  No results for input(s): CRP in the last 72 hours. Sed Rate:  No results for input(s): SEDRATE in the last 72 hours. Cultures:   No results for input(s): CULTURE in the last 72 hours. Radiology reports as per the Radiologist  Radiology: No results found. ASSESSMENT AND PLAN:  #1.    Russell Hoffman is a 68 y.o.  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 · CKD and chemotherapy associated anemia, on Procrit  · BPH on Flomax  · Orthostatic hypotension medications managed by Dr. Jasiel Love    He completed 4 cycles of combination chemotherapy with carboplatin, Keytruda, Alimta.    Most recently Sophia has been receiving  maintenance therapy with Keytruda and Alimta every 3 weeks. He has CKD associated anemia responding to Procrit.     Prabhjot has benign prostatic hypertrophy (BPH)  that is stable on Flomax. Orthostatic hypotension resolved with adjustment of metoprolol by Dr. Jasiel Love   He takes Tambocor, metoprolol without new cardiac issues. Protonix continues without any new exacerbations of GERD. Lower extremity edema overall has actually improved some.     Prabhjot received cycle #25 of Keytruda/Alimta on 10/29/2020. Treatment has been held since that time. Physical examination does not reveal evidence of palpable lymphadenopathy in the supraclavicular infraclavicular or axillary areas. Lungs are relatively clear heart is regular abdomen is soft and benign. CBC 7/9/2020 revealed a WBC of 7.89. Hgb is 10 with an MCV of 112.6 and platelet count of 223,612. CBC today (8/27/2020) reveals a WBC of 7.25. Hgb is 9.2 with an MCV of 114.1 and platelet count of 656,394. Serology on 6/18/2020 revealed:  CMP with glucose 171, BUN 28, creatinine 2.1  TSH- 1.010    Serology on 7/9/2020 revealed:  CMP with glucose 184, BUN 26, creatinine 2.1, albumin 3.4, ALT 19  TSH- 1.180    Serology on 8/6/2020 revealed:  CMP with sodium 135, glucose 126, BUN 27, creatinine 2.2, ALT 15  TSH- 1.670    CXR on 6/5/25020 documented severe chronic lung changes. Cycle #27 of Willadean Buffy will be delivered today.     CT abdomen and pelvis without contrast on 7/16/2020 at [facility] was compared to 1/9/2020 and documented:  · 1.5 x 0.9 cm aortocaval lymph node, previously 1.9 x 1.2 cm  · No new abnormality seen     CT chest without contrast on 11/12/2020 at [facility] ws compared to 1/9/2020 and documented:  · Mixed response therapy with interval decrease in size in the RIGHT upper lobe pulmonary nodule but increase in size and mediastinal lymph nodes. · In addition, there is severe emphysema with findings of severe pulmonary fibrosis. Interval worsening of interstitial opacities bilaterally as well as suggestion of some pleural nodularity. Lymphangitic spread of malignancy should be considered. · Small pleural effusion on the RIGHT is new     CT abdomen and pelvis without contrast on 11/12/2020 at [facility] was compared to 7/16/2020 and documented:  · The aortic caval node described on the comparison study is not significantly changed in size when measured at the same location. · Nonobstructing renal calculus on the LEFT. No change in the indeterminate renal lesions on the LEFT, likely cysts. · Nonspecific free fluid in the pelvis, new since the prior study and there is some mild nonspecific mesenteric haziness    Cycle #26 was due on 11/12/2020 but held due to issues of dyspnea and shortness of breath. A course of prednisone was initiated and further chemotherapy was held, unable to give Keytruda while on steroids. Mr. Imani Blackburn presents today (12/3/2020) with 2 more doses of prednisone due, 1 tonight and 1 in the morning to review his clinical condition, symptoms, CBC and further recommendations    He is doing remarkably well clinically. He feels stronger. He is not requiring blood pressure medications anymore. He continues to use supplemental oxygen as needed at home    He is actually desirous to proceed with chemotherapy however is just now getting tapered off of prednisone.     After an extended discussion regarding the findings on the CT scans from 11/12/2020 and other questions, plan is to have him return in 2 weeks to resume [address] if he continues to be stable. CBC today has a WBC of 13.6 with a hemoglobin of 11.9 and a platelet count 753,065. 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         IDimple LPN am scribing for Shama Barkley MD. Electronically signed by Cyndi Palacios LPN on 18/8/6898 at 3:78 PM       Gage Dove was seen today for lung cancer. Diagnoses and all orders for this visit:    Non-small cell lung cancer, unspecified laterality ([address]    Secondary malignant neoplasm of other digestive organs ([address]    Anemia, chronic renal failure, stage 3 (moderate)        No orders of the defined types were placed in this encounter. No orders of the defined types were placed in this encounter. Return in about 2 weeks (around 12/17/2020) for treatment & see Dr. Paco Barrera. I, Dr. Anamika Stiles, personally performed the services described in this documentation as scribed by Cyndi Palacios LPN in my presence, and it is both accurate and complete.

## 2020-12-17 ENCOUNTER — HOSPITAL ENCOUNTER (OUTPATIENT)
Dept: GENERAL RADIOLOGY | Age: 80
Discharge: HOME | End: 2020-12-17
Payer: MEDICARE

## 2020-12-17 ENCOUNTER — HOSPITAL ENCOUNTER (OUTPATIENT)
Dept: INFUSION THERAPY | Age: 80
Discharge: HOME | End: 2020-12-17
Payer: MEDICARE

## 2020-12-17 ENCOUNTER — OFFICE VISIT (OUTPATIENT)
Dept: HEMATOLOGY | Age: 80
End: 2020-12-17
Payer: MEDICARE

## 2020-12-17 VITALS
HEIGHT: 64 IN | WEIGHT: 154 LBS | DIASTOLIC BLOOD PRESSURE: 86 MMHG | TEMPERATURE: 97.7 F | SYSTOLIC BLOOD PRESSURE: 142 MMHG | OXYGEN SATURATION: 92 % | BODY MASS INDEX: 26.29 KG/M2 | HEART RATE: 89 BPM

## 2020-12-17 DIAGNOSIS — C34.90 NON-SMALL CELL LUNG CANCER, UNSPECIFIED LATERALITY (HCC): Primary | ICD-10-CM

## 2020-12-17 DIAGNOSIS — D63.1 ANEMIA, CHRONIC RENAL FAILURE, STAGE 3 (MODERATE) (HCC): ICD-10-CM

## 2020-12-17 DIAGNOSIS — R53.83 FATIGUE DUE TO TREATMENT: ICD-10-CM

## 2020-12-17 DIAGNOSIS — N18.30 ANEMIA, CHRONIC RENAL FAILURE, STAGE 3 (MODERATE) (HCC): ICD-10-CM

## 2020-12-17 DIAGNOSIS — C34.11 MALIGNANT NEOPLASM OF UPPER LOBE, RIGHT BRONCHUS OR LUNG (HCC): ICD-10-CM

## 2020-12-17 LAB
ALBUMIN SERPL-MCNC: 3.1 G/DL (ref 3.5–5.2)
ALP BLD-CCNC: 131 U/L (ref 40–130)
ALT SERPL-CCNC: 18 U/L (ref 21–72)
ANION GAP SERPL CALCULATED.3IONS-SCNC: 10 MMOL/L (ref 7–19)
AST SERPL-CCNC: 71 U/L (ref 17–59)
BASOPHILS ABSOLUTE: 0.02 K/UL (ref 0.01–0.08)
BASOPHILS RELATIVE PERCENT: 0.2 % (ref 0.1–1.2)
BILIRUB SERPL-MCNC: 0.5 MG/DL (ref 0.2–1.3)
BUN BLDV-MCNC: 23 MG/DL (ref 9–20)
CALCIUM SERPL-MCNC: 10.4 MG/DL (ref 8.4–10.2)
CHLORIDE BLD-SCNC: 104 MMOL/L (ref 98–111)
CO2: 25 MMOL/L (ref 22–29)
CREAT SERPL-MCNC: 1.8 MG/DL (ref 0.6–1.2)
EOSINOPHILS ABSOLUTE: 0.02 K/UL (ref 0.04–0.54)
EOSINOPHILS RELATIVE PERCENT: 0.2 % (ref 0.7–7)
GFR NON-AFRICAN AMERICAN: 37
GLUCOSE BLD-MCNC: 183 MG/DL (ref 74–106)
HCT VFR BLD CALC: 33.6 % (ref 40.1–51)
HEMOGLOBIN: 11.5 G/DL (ref 13.7–17.5)
LYMPHOCYTES ABSOLUTE: 0.52 K/UL (ref 1.18–3.74)
LYMPHOCYTES RELATIVE PERCENT: 6.2 % (ref 19.3–53.1)
MCH RBC QN AUTO: 35.5 PG (ref 25.7–32.2)
MCHC RBC AUTO-ENTMCNC: 34.2 G/DL (ref 32.3–36.5)
MCV RBC AUTO: 103.7 FL (ref 79–92.2)
MONOCYTES ABSOLUTE: 0.48 K/UL (ref 0.24–0.82)
MONOCYTES RELATIVE PERCENT: 5.7 % (ref 4.7–12.5)
NEUTROPHILS ABSOLUTE: 7.4 K/UL (ref 1.56–6.13)
NEUTROPHILS RELATIVE PERCENT: 87.7 % (ref 34–71.1)
PDW BLD-RTO: 17.1 % (ref 11.6–14.4)
PLATELET # BLD: 224 K/UL (ref 163–337)
PMV BLD AUTO: 10.7 FL (ref 7.4–10.4)
POTASSIUM SERPL-SCNC: 4.1 MMOL/L (ref 3.5–5.1)
RBC # BLD: 3.24 M/UL (ref 4.63–6.08)
SODIUM BLD-SCNC: 139 MMOL/L (ref 137–145)
TOTAL PROTEIN: 6.6 G/DL (ref 6.3–8.2)
TSH SERPL DL<=0.05 MIU/L-ACNC: 1.41 UIU/ML (ref 0.47–4.68)
WBC # BLD: 8.44 K/UL (ref 4.23–9.07)

## 2020-12-17 PROCEDURE — G8484 FLU IMMUNIZE NO ADMIN: HCPCS | Performed by: INTERNAL MEDICINE

## 2020-12-17 PROCEDURE — 1123F ACP DISCUSS/DSCN MKR DOCD: CPT | Performed by: INTERNAL MEDICINE

## 2020-12-17 PROCEDURE — 2580000003 HC RX 258: Performed by: INTERNAL MEDICINE

## 2020-12-17 PROCEDURE — 96413 CHEMO IV INFUSION 1 HR: CPT

## 2020-12-17 PROCEDURE — 96372 THER/PROPH/DIAG INJ SC/IM: CPT

## 2020-12-17 PROCEDURE — G8427 DOCREV CUR MEDS BY ELIG CLIN: HCPCS | Performed by: INTERNAL MEDICINE

## 2020-12-17 PROCEDURE — 6360000002 HC RX W HCPCS: Performed by: INTERNAL MEDICINE

## 2020-12-17 PROCEDURE — 96417 CHEMO IV INFUS EACH ADDL SEQ: CPT

## 2020-12-17 PROCEDURE — 99215 OFFICE O/P EST HI 40 MIN: CPT | Performed by: INTERNAL MEDICINE

## 2020-12-17 PROCEDURE — 85025 COMPLETE CBC W/AUTO DIFF WBC: CPT

## 2020-12-17 PROCEDURE — 1036F TOBACCO NON-USER: CPT | Performed by: INTERNAL MEDICINE

## 2020-12-17 PROCEDURE — 80053 COMPREHEN METABOLIC PANEL: CPT

## 2020-12-17 PROCEDURE — 71046 X-RAY EXAM CHEST 2 VIEWS: CPT

## 2020-12-17 PROCEDURE — G8417 CALC BMI ABV UP PARAM F/U: HCPCS | Performed by: INTERNAL MEDICINE

## 2020-12-17 PROCEDURE — 96375 TX/PRO/DX INJ NEW DRUG ADDON: CPT

## 2020-12-17 PROCEDURE — 4040F PNEUMOC VAC/ADMIN/RCVD: CPT | Performed by: INTERNAL MEDICINE

## 2020-12-17 PROCEDURE — 84443 ASSAY THYROID STIM HORMONE: CPT

## 2020-12-17 RX ORDER — SODIUM CHLORIDE 0.9 % (FLUSH) 0.9 %
10 SYRINGE (ML) INJECTION PRN
Status: DISCONTINUED | OUTPATIENT
Start: 2020-12-17 | End: 2020-12-18 | Stop reason: HOSPADM

## 2020-12-17 RX ORDER — CYANOCOBALAMIN 1000 UG/ML
1000 INJECTION INTRAMUSCULAR; SUBCUTANEOUS ONCE
Status: DISCONTINUED | OUTPATIENT
Start: 2020-12-17 | End: 2020-12-19 | Stop reason: HOSPADM

## 2020-12-17 RX ORDER — METHYLPREDNISOLONE 4 MG/1
TABLET ORAL
Qty: 1 KIT | Refills: 0 | Status: SHIPPED | OUTPATIENT
Start: 2020-12-17 | End: 2020-12-23

## 2020-12-17 RX ORDER — METHYLPREDNISOLONE SODIUM SUCCINATE 125 MG/2ML
125 INJECTION, POWDER, LYOPHILIZED, FOR SOLUTION INTRAMUSCULAR; INTRAVENOUS PRN
Status: CANCELLED | OUTPATIENT
Start: 2020-12-17

## 2020-12-17 RX ORDER — SODIUM CHLORIDE 9 MG/ML
20 INJECTION, SOLUTION INTRAVENOUS ONCE
Status: CANCELLED | OUTPATIENT
Start: 2020-12-17 | End: 2020-12-17

## 2020-12-17 RX ORDER — SODIUM CHLORIDE 0.9 % (FLUSH) 0.9 %
5 SYRINGE (ML) INJECTION PRN
Status: CANCELLED | OUTPATIENT
Start: 2020-12-17

## 2020-12-17 RX ORDER — EPINEPHRINE 1 MG/ML
0.3 INJECTION, SOLUTION, CONCENTRATE INTRAVENOUS PRN
Status: CANCELLED | OUTPATIENT
Start: 2020-12-17

## 2020-12-17 RX ORDER — SODIUM CHLORIDE 0.9 % (FLUSH) 0.9 %
10 SYRINGE (ML) INJECTION PRN
Status: CANCELLED | OUTPATIENT
Start: 2020-12-17

## 2020-12-17 RX ORDER — LEVOFLOXACIN 500 MG/1
500 TABLET, FILM COATED ORAL DAILY
Qty: 7 TABLET | Refills: 0 | Status: SHIPPED | OUTPATIENT
Start: 2020-12-17 | End: 2020-12-24

## 2020-12-17 RX ORDER — HEPARIN SODIUM (PORCINE) LOCK FLUSH IV SOLN 100 UNIT/ML 100 UNIT/ML
500 SOLUTION INTRAVENOUS PRN
Status: CANCELLED | OUTPATIENT
Start: 2020-12-17

## 2020-12-17 RX ORDER — PALONOSETRON 0.05 MG/ML
0.25 INJECTION, SOLUTION INTRAVENOUS ONCE
Status: CANCELLED | OUTPATIENT
Start: 2020-12-17

## 2020-12-17 RX ORDER — PALONOSETRON 0.05 MG/ML
0.25 INJECTION, SOLUTION INTRAVENOUS ONCE
Status: COMPLETED | OUTPATIENT
Start: 2020-12-17 | End: 2020-12-17

## 2020-12-17 RX ORDER — DIPHENHYDRAMINE HYDROCHLORIDE 50 MG/ML
50 INJECTION INTRAMUSCULAR; INTRAVENOUS PRN
Status: CANCELLED | OUTPATIENT
Start: 2020-12-17

## 2020-12-17 RX ORDER — DEXAMETHASONE SODIUM PHOSPHATE 10 MG/ML
10 INJECTION, SOLUTION INTRAMUSCULAR; INTRAVENOUS ONCE
Status: COMPLETED | OUTPATIENT
Start: 2020-12-17 | End: 2020-12-17

## 2020-12-17 RX ORDER — HEPARIN SODIUM (PORCINE) LOCK FLUSH IV SOLN 100 UNIT/ML 100 UNIT/ML
500 SOLUTION INTRAVENOUS PRN
Status: DISCONTINUED | OUTPATIENT
Start: 2020-12-17 | End: 2020-12-19 | Stop reason: HOSPADM

## 2020-12-17 RX ORDER — CYANOCOBALAMIN 1000 UG/ML
1000 INJECTION INTRAMUSCULAR; SUBCUTANEOUS ONCE
Status: CANCELLED | OUTPATIENT
Start: 2020-12-17 | End: 2020-12-17

## 2020-12-17 RX ADMIN — PALONOSETRON HYDROCHLORIDE 0.25 MG: 0.25 INJECTION, SOLUTION INTRAVENOUS at 10:53

## 2020-12-17 RX ADMIN — DEXAMETHASONE SODIUM PHOSPHATE 10 MG: 10 INJECTION INTRAMUSCULAR; INTRAVENOUS at 10:53

## 2020-12-17 RX ADMIN — SODIUM CHLORIDE, PRESERVATIVE FREE 10 ML: 5 INJECTION INTRAVENOUS at 11:15

## 2020-12-17 RX ADMIN — HEPARIN 500 UNITS: 100 SYRINGE at 11:15

## 2020-12-17 NOTE — PROGRESS NOTES
Patient:  Melody Adam  YOB: 1940  Date of Service: 12/17/2020  MRN: [id number]    Primary Care Physician: Katina Fagan MD    Chief Complaint   Patient presents with    Lung Cancer       Patient Seen, Chart, Consults notes, Labs, Radiology studies reviewed. Subjective:  Melody Adam is a 68 y.o.  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 · CKD and chemotherapy associated anemia, on Procrit  · BPH on Flomax  · Orthostatic hypotension medications managed by Dr. Sahra Siddiqui    He completed 4 cycles of combination chemotherapy with carboplatin, Keytruda, Alimta.    Most recently Sophia has been receiving  maintenance therapy with Keytruda and Alimta every 3 weeks. He has CKD associated anemia responding to Procrit.     Prabhjot has benign prostatic hypertrophy (BPH)  that is stable on Flomax. Orthostatic hypotension resolved with adjustment of metoprolol by Dr. Sahra Siddiqui   He takes Tambocor, metoprolol without new cardiac issues. Protonix continues without any new exacerbations of GERD. Lower extremity edema overall has actually improved some.     Prabhjot received cycle #25 of Keytruda/Alimta on 10/29/2020. Treatment has been held since that time. CT chest without contrast on 11/12/2020 at [facility] ws compared to 1/9/2020 and documented:  · Mixed response therapy with interval decrease in size in the RIGHT upper lobe pulmonary nodule but increase in size and mediastinal lymph nodes. · In addition, there is severe emphysema with findings of severe pulmonary fibrosis. Interval worsening of interstitial opacities bilaterally as well as suggestion of some pleural nodularity. Lymphangitic spread of malignancy should be considered.   · Small pleural effusion on the RIGHT is new An MRI of the abdomen and pelvis W & WO on 10/11/2018 identified in the 4.1 x 3.4 cm soft tissue mass in the subhepatic space, abutting the second portion of the duodenum with mild displacement of the duodenum. There does not appear to be involvement of the pancreas, and the pancreas appears within normal limits. Differential diagnoses include a desmoid tumor, less likely leiomyoma of the wall of the duodenum or malignancy. Dr. Malaika Castellano requested a CT-guided biopsy of the right upper lobe mass. Dr. Cassandra Ramey, the radiologist, evaluated the area with a repeat CT scan of the chest on 10/11/2018. He spoke with Dr. Malaika Castellano indicating that he would not be able to perform the biopsy because the tumor was not accessible, it was under the scapula , which blocked access and therefore the biopsy procedure was canceled.     On the CT scan of the chest on 10/11/2018 measurements of the RUL lung mass was 5.7 x 3.2 cm with irregular margins suspicious for a primary lung neoplasm. Soft tissue associated with the tumor appeared to extend into the bronchus supplying this portion of the RUL of the lung. Dr. Cassandra Ramey indicated that the mass had an endobronchial component and should be easily assessable via bronchoscopy.     The chest CT also included a portion of the upper abdomen where a soft tissue mass was described in an addendum report. In the subhepatic space measuring 4.0 x 3.9 cm, displacing the second portion of the duodenum medially. A referral was made to Dr. Leonora Hawk for consideration for bronchoscopy for biopsy.     On 10/24/2018, Dr. Jessica Calvillo performed a bronchoscopy with EBUS guided biopsy of a 3 cm subcarinal lymph node along with bronchoalveolar lavage of the posterior segment of the RUL of the lung. The final pathology of the station 7 lymph node only revealed a background of small mature lymphocytes with clusters of histiocytes suggestive of granuloma. Negative for malignant cells. Kinyoun and GMS stains were negative for AFB and fungal organisms respectively. The bronchial washings were negative for malignant cells as well.      Mr. Mirna Pressley was referred to Dr. Kei Farley at [facility] in [place], [place], for navigational bronchoscopy and biopsy under ultrasound.     On 11/27/2018 Dr. Kei Farley performed a bronchoscopy, navigational protocol, endobronchial ultrasound and biopsy of the RUL of the lung mass along with multiple lymph node station Biopsies.   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 · All lymph nodes sampled were NEGATIVE for malignant cells including stations 10L, 4L, station 7, 10R, 4R.   · IHC studies were positive for CK7, TTF-1 and Napsin A.   · IHC studies on tumor cells were negative for CDX2, CK5/6, and p63   · Further lung profile molecular testing is pending     All of the above was discussed at length with Mr. Mirna Pressley at his 12/13/2018 clinic visit. He was agreeable for referral for consideration of resection of the lung lesion.      He is comfortable with and would like a referral to Dr. Debarah Runner ([phone]) at [place] [address], [address] [place] [address], [address] [place] [address]. Logistics, however, are planning a big part in his decision making and he may decide to have surgery done in the [facility] area.     If he decides to have surgery in the [facility], referral will be made to Dr. Francois Otoole.      CT chest with contrast 2/18/2019 at [facility] to 9/12/2018 revealed a 4.9 x 3.5 cm spiculated RUL lung mass which actually decreased in size from a prior value of 6.1 x 3.6 cm.    Subhepatic mass adjacent to the descending duodenum had increased in size significantly to 4.3 x 9 cm compared to 4.1 x 3.4 cm on the 10/11/18 MRI of the abdomen.    CT of the abdomen and pelvis without contrast on 3/20/2019 at [facility] was compared to outpatient CT data and pelvis on 9/12/2018 an MRI of the abdomen from 10/11/2018. A soft tissue mass was again encountered in the subhepatic space which abutted the distal stomach and proximal duodenum measuring 8.9 x 5.4 cm which has increased considerably from a prior measurement of 4.1 x 3.4 cm. Medial to the left renal hilum a 3.5 cm lobular mass causing some mild left-sided hydronephrosis.     Mr. Johnny Valenzuela was referred to Dr. Mckay Melissa who saw him on 1/22/2019 anticipating plans for a curative resection.      Dr. Araseli Cadet received a phone call on 2/20/2019 from Dr. Marsha Davidson , indicating that the previously documented an abdominal pancreatic area mass had doubled in size and was causing compression into the left kidney/renal pelvis producing a hydroureter.      Dr. Marsha Davidson arranged a referral to Dr. Dameon Montano who will be placing a stent 3/8/2019.     Mr. Johnny Valenzuela was scheduled to be seen by gastroenterology at [facility] on 3/13/2019 for EGD/EUS assessment and biopsy of the enlarging peripancreatic/duodenal area mass. With a decrease in the lung mass and increased size of the subhepatic masssurgical resection was abandoned at present pending further evaluation of the subhepatic mass. Dr. Araseli Cadet discussed treatment options with the unresectable lung mass and the per he pancreatic mass and recommended a combination of Keytruda, Alimta, carboplatin. He was subsequently admitted to [facility] 4/26 - 4/30/2019 with abdominal pain and melena. WBC fantasma was 0.75 with ANC 0.34. PLT did drop to 24,000. He did have duodenal ulcerations that were bleeding on endoscopy. He was stabilized but then readmitted from 5/8 - 5/10/2019 with recurrent melanoma. A repeat endoscopy was performed. It was felt that exacerbation of his bleeding from the duodenal came about by his thrombocytopenia from treatment. Subsequent dosing was adjusted and Neulasta was added.     CT chest without contrast at [facility] on 6/27/2019 was compared to 2/18/2019 revealed that the right lung mass that extended into the right hilum has decreased from 5.2 x 3.5 down to 4.6 x 3.6. There is increased central cavitation in the mass consistent with tumor necrosis. Mediastinal lymphadenopathy is relatively stable.     CT abdomen and pelvis without contrast at [facility] on 6/27/2019 was compared to 4/26/2019 revealed complete resolution of the previously identified. Duodenal/subhepatic space soft tissue mass. The previously identified heterogenous enhancing lesion in the left renal pelvis was much less prominent but there does continue to be some mild left caliectasis. I reviewed the CT results with Dr. Solomon Bamberger on 7/5/2019 who then relayed them as well to [facility]. We've had excellent results from this combination of therapy. He will complete the fourth combination therapy of Mills Ruffini   and then go to maintenance Keytruda plus Alimta.     CT chest without contrast at [facility] on 1/9/2020 was compared to 10/17/2019 revealing:   · A stable spiculated RUL nodule/mass with similar mediastinal adenopathy. · Questionable right hilar adenopathy with diminished assessment due to lack of IV contrast.    · Advanced emphysema with severe pulmonary fibrosis.     · No new pulmonary nodules.     CT abdomen and pelvis without contrast at [facility] on 1/9/2020 was compared to 10/17/2019 revealing: · Mildly prominent aortocaval RP lymph nodes with position just below the level of the renal vein worrisome for potential lymphatic metastases. This is similar to 10/17/2019 but increased from 4/26/2019. · Pneumobilia without discrete suspicious focal lesion in the liver. · Wall thickening of the duodenum. · Similar and stable renal lesions favoring cysts.     He has had numerous endoscopies within the past year. While he was having an Endo EUS and had cardiac issues and was actually admitted to the intensive care unit. CT abdomen and pelvis without contrast on 7/16/2020 at [facility] was compared to 1/9/2020 and documented:  · 1.5 x 0.9 cm aortocaval lymph node, previously 1.9 x 1.2 cm  · No new abnormality seen    CT chest without contrast on 11/12/2020 at [facility] ws compared to 1/9/2020 and documented:  · Mixed response therapy with interval decrease in size in the RIGHT upper lobe pulmonary nodule but increase in size and mediastinal lymph nodes. · In addition, there is severe emphysema with findings of severe pulmonary fibrosis. Interval worsening of interstitial opacities bilaterally as well as suggestion of some pleural nodularity. Lymphangitic spread of malignancy should be considered. · Small pleural effusion on the RIGHT is new     CT abdomen and pelvis without contrast on 11/12/2020 at [facility] was compared to 7/16/2020 and documented:  · The aortic caval node described on the comparison study is not significantly changed in size when measured at the same location. · Nonobstructing renal calculus on the LEFT. No change in the indeterminate renal lesions on the LEFT, likely cysts.   · Nonspecific free fluid in the pelvis, new since the prior study and there is some mild nonspecific mesenteric haziness         TREATMENT SUMMARY 1. Keytruda, carboplatin, Alimta initiated 4/18/2019 to be given every 3 weeks for 4 cycles - 4th cycle 7/5/2019, then Keytruda + Alimta as maintenance to continue indefinitely until progression or intolerable side effects              #2 TUMOR HISTORY: Pancreatic mass diagnosed 10/29/03  Mr. Justin Warren presented in October 2003 with obstructive jaundice. A CT scan of the abdomen on 10/26/2003 documented a 3.2 x 4 x 4.2 cm mass in the head of the pancreas.      On 10/29/03 Dr. Gertrudis Elaine performed a resection of a portion of the head of the pancreas on [facility] along with a Mitul-en-Y procedure and a choledochojejunostomy for what was felt to be a pancreatic cancer. His pancreas was bypassed because of the findings. Pathology of the biopsies were negative for malignancy showing a fibrosed pancreas. Mr. Justin Warren was referred to Dr. Nadia Hernandez in [place].     Dr. Nadia Hernandez performed ERCP with an EUS and biopsy on 02/26/04   Pathology again was negative for cancer or malignancy. Routine periodic serologic and radiographic monitoring has not disclosed progression of the mass or development of new malignancy or increase in pancreatic tumor markers.      A CT scan of the abdomen and pelvis with contrast on 9/12/2018 documented a 4.9 x 4 x 4 cm soft tissue mass with peripheral calcification immediately adjacent to the gallbladder in the head of the pancreas area.     An MRI of the abdomen and pelvis W & WO on 10/11/2018 identified in the 4.1 x 3.4 cm soft tissue mass in the subhepatic space, abutting the second portion of the duodenum with mild displacement of the duodenum. There does not appear to be involvement of the pancreas, and the pancreas appears within normal limits.   Differential diagnoses include a desmoid tumor, less likely leiomyoma of the wall of the duodenum or malignancy.    CT of the abdomen and pelvis without contrast on 3/20/2019 at [facility] was compared to outpatient CT data and pelvis on 9/12/2018 an MRI of the abdomen from 10/11/2018. A soft tissue mass was again encountered in the subhepatic space which abutted the distal stomach and proximal duodenum measuring 8.9 x 5.4 cm which has increased considerably from a prior measurement of 4.1 x 3.4 cm. Medial to the left renal hilum a 3.5 cm lobular mass causing some mild left-sided hydronephrosis.     Mr. Melissa Hodgson was scheduled for an EGD/EUS by Dr. Amy Flannery as an outpatient procedure on 3/13/2019 for assessment and biopsy of the enlarging peripancreatic/duodenal area mass. Unfortunately, after initiation of the procedure, he began having ventricular tachycardia and the procedure had to be aborted. Aman Vickers was transferred to the ICU for cardiology evaluation and stabilization. He remained hospitalized until 3/18/2019 when he was medically cleared for discharge.     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 No emergent urologic intervention was warranted and he received monitoring of renal function. He had a creatinine level of 1.9 at discharge.     PET scan on 4/2/2019 identified a soft tissue mass in the RUL measuring 1.2 x 3.5 cm with extension to the right anterior hilum with an SUV of 10.1. Evidence of mediastinal and hilar lymphadenopathy, with low-level metabolic activity. Interval increase in the size of a large mass in the right upper abdomen inseparable from the duodenum measuring 10.7 x 6.9 cm compared to 5.4 x 8.9 cm on 2/20/2019 with an SUV of 23.6. Slight increase in 2 small inseparable masses medial to the hilum of the left kidney measuring 2.2 and 2.6 cm and the other measuring 3.9 cm with a maximum SUV of 18. 6.    Cycle #1 of palliative chemotherapy with Carboplatin, Alimta and Keytruda was initiated 4/18/19 which should also cover this process.     Abdominal/pelvic CT 4/26/19 was performed at [facility] due to abdominal pain and melena. The RUQ mass, within the duodenum decreased to 6.8 x 6.2 cm (was 10.7 x 6.9 cm on PET 4/2/19)     CT abdomen and pelvis without contrast at [facility] on 6/27/2019 was compared to 4/26/2019 revealed complete resolution of the previously identified. Duodenal/subhepatic space soft tissue mass. The previously identified heterogenous enhancing lesion in the left renal pelvis was much less prominent but there does continue to be some mild left caliectasis.     CT chest without contrast at [facility] on 1/9/2020 was compared to 10/17/2019 revealing:   · A stable spiculated RUL nodule/mass with similar mediastinal adenopathy. · Questionable right hilar adenopathy with diminished assessment due to lack of IV contrast.    · Advanced emphysema with severe pulmonary fibrosis. · No new pulmonary nodules.     CT abdomen and pelvis without contrast at [facility] on 1/9/2020 was compared to 10/17/2019 revealing:   · Mildly prominent aortocaval RP lymph nodes with position just below the level of the renal vein worrisome for potential lymphatic metastases. This is similar to 10/17/2019 but increased from 4/26/2019. · Pneumobilia without discrete suspicious focal lesion in the liver. · Wall thickening of the duodenum. · Similar and stable renal lesions favoring cysts.         He has had numerous endoscopies within the past year. About 10 months ago he was having an Endo EUS and had cardiac issues and was actually admitted to the intensive care unit. This area will just be monitored on follow-up scans.           #1 HEMATOLOGICAL HISTORY: IgG kappa MGUS- Dx: 02/23/06. During the course of Mr. Buitrago Linear follow up, an abnormally elevated protein was noted on one occasion, which led to workup including quantitative immunoglobulins.      An elevated IgG kappa was encountered. This has remained stable in the 2500 range since early 2006.      A bone marrow biopsy and aspirate on 02/23/06 was negative with only 4% plasma cells.      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      24 hour urine for immunoelectrophoresis did not reveal an M-spike. Serology studies on 9/18/2018 documented an elevated, stable IgG of 2589 with an M spike of 0.7. Immunofixation continued to reveal IgG monoclonal protein with kappa light chain specificity.     #2 HEMATOLOGICAL HISTORY: Iron deficiency anemia, 9/18/2018  [place] had serology on 9/18/2018 that identified iron deficiency anemia. His lab work was obtained at [facility].  An iron level was 12, iron saturation 7%, ferritin 256  Folate >20, and vitamin B12 1044. Dr. Andres Benites placed Sophia on ferrous sulfate 325 mg daily on 9/25/2018 , but this failed to resolve the anemia.     An EGD by Dr. Wilfredo Shaw on 12/11/2018 documented a normal esophagus, normal stomach and a degree of duodenal deformity. Gastric biopsy was urease negative.     Colonoscopy by Dr. Wilfredo Shaw on 1/9/2019 documented a polyp at 80 cm. Pathology identified a tubular adenoma, negative for high-grade dysplasia. Feraheme administered.     Labs on 3/13/2019:  · Iron 25   · Iron saturation 22%   · TIBC 116   · Ferritin >2000   · Reticulocyte count 0.0578   ·    · Haptoglobin 341   · Folate >20   · Vitamin B12 945   · Erythropoietin 13     He presented to [facility] on 4/26/2019 with melena and abdominal discomfort. He was subsequently admitted     EGD per Dr. Agusto Taveras on 4/29/2019 revealed  · LA grade (1 or more mucosal breaks greater than 5 mm, not extending between the tops of the 2 mucosal folds)? esophagitis with no bleeding found in the distal esophagus  dexamethasone (DECADRON) 4 MG tablet TAKE 1 TABLET BY MOUTH TWICE DAILY DAY  BEFORE  CHEMO,  THE  DAY  OF  CHEMO  AND  THE  DAY  AFTER  --EVERY  21  DAYS 24 tablet 0    folic acid (FOLVITE) 1 MG tablet Take 1 tablet by mouth once daily 30 tablet 5    spironolactone (ALDACTONE) 25 MG tablet Take 25 mg by mouth daily      flecainide (TAMBOCOR) 50 MG tablet Take 50 mg by mouth 2 times daily      sodium bicarbonate 650 MG tablet Take 650 mg by mouth 2 times daily      pantoprazole (PROTONIX) 40 MG tablet Take 40 mg by mouth daily      tamsulosin (FLOMAX) 0.4 MG capsule Take 0.4 mg by mouth daily      metoprolol tartrate (LOPRESSOR) 25 MG tablet Take 0.5 tablets by mouth 2 times daily (Patient taking differently: Take 12.5 mg by mouth daily ) 60 tablet 0    Multiple Vitamin (MULTI VITAMIN DAILY PO) Take by mouth daily        No current facility-administered medications for this visit.       Facility-Administered Medications Ordered in Other Visits   Medication Dose Route Frequency Provider Last Rate Last Admin    sodium chloride flush 0.9 % injection 10 mL  10 mL Intravenous PRN Linda Kulkarni MD   10 mL at 12/17/20 1115    heparin flush 100 UNIT/ML injection 500 Units  500 Units Intracatheter PRN Linda Kulkarni MD   500 Units at 12/17/20 1115    cyanocobalamin injection 1,000 mcg  1,000 mcg Intramuscular Once Linda Kulkarni MD           Past Medical History:      Diagnosis Date    Abnormal weight loss     Anemia     Anemia, unspecified     Blind left eye     Cardiomyopathy ([address]     with compensated CHF    Cellulitis     Cellulitis of unspecified part of limb     Chronic kidney disease, stage IV (severe) ([address]     Dr. Kaylan Pablo COPD (chronic obstructive pulmonary disease) [facility]     Duodenal ulcer     Essential hypertension 10/2/2017    Eye injury     at age 12 from penetrating injury    Gout     HTN (hypertension)     Hydronephrosis     Hydronephrosis, left     Liver abscess resolved    Lung nodule     Malignant neoplasm (HCC)     Malignant neoplasm of upper lobe, right bronchus or lung (HCC)     MGUS (monoclonal gammopathy of unknown significance)     secondary to an IgG kappa. IgG level in 2,000 range    Monoclonal gammopathy     NICM (nonischemic cardiomyopathy) [facility]     Non-small cell lung cancer ([facility] 2019    Non-sustained ventricular tachycardia (HCC)     NSVT (nonsustained ventricular tachycardia) (HCC)     Osteoarthritis     Other fatigue     Other iron deficiency anemias     Secondary malignant neoplasm of other digestive organs ([facility]     Weight loss         Past Surgical History:      Procedure Laterality Date    BRONCHOSCOPY  2018    dx of adenocarcinoma RUL  Dr. Shanthi Griffith CATH LAB PROCEDURE      ENDOSCOPY, COLON, DIAGNOSTIC  2019    aborted d/t vtach    EYE SURGERY Left     EYE SURGERY  1964    FOOT SURGERY      removal of joint from right toe from drilling accident, [facility]  10/29/2003    with resection  Mitul-en-Y proecedure  DR. Mcfarlane    TUNNELED VENOUS PORT PLACEMENT      UPPER GASTROINTESTINAL ENDOSCOPY N/A 3/13/2019    EGD W/EUS FNA performed by Any Stevenson MD at [facility]        Family History:      Problem Relation Age of Onset    Osteoporosis Mother     High Blood Pressure Mother     Asthma Mother     Bleeding Prob Father     Cancer Father         leukemia    Asthma Brother         Social History  Social History     Tobacco Use    Smoking status: Former Smoker     Packs/day: 2.00     Years: 40.00     Pack years: 80.00     Types: Cigarettes     Quit date: 10/29/2003     Years since quittin.1    Smokeless tobacco: Never Used   Substance Use Topics    Alcohol use: No    Drug use: No          Review of Systems:  Constitutional: Negative for chills, fatigue, fever or significant weight loss. HENT: Negative for congestion, hearing loss, nosebleeds or sore throat. Eyes: Negative for photophobia, pain, discharge, redness and visual disturbance. Respiratory: Negative for cough, shortness of breath, or wheezing. Cardiovascular: Negative for chest pain, palpitations or leg swelling. Gastrointestinal: Negative for abdominal pain, blood in stool, constipation, diarrhea, nausea or vomiting. Genitourinary: Negative for dysuria, flank pain, frequency, hematuria or urgency. Musculoskeletal: Negative for back pain, joint swelling, myalgias or neck pain. Skin: Negative for rash or petechiae. Neurological: Negative for tremors, seizures, syncope, weakness or headaches. Hematological: No active bruising or bleeding. Psychiatric/Behavioral: Negative for hallucinations. Objective:  Vital Signs: Blood pressure (!) 142/86, pulse 89, temperature 97.7 °F (36.5 °C), height 5' 4\" (1.626 m), weight 154 lb (69.9 kg), SpO2 92 %. Physical Exam   Constitutional: Oriented to person, place, and time. No acute distress. Head: Normocephalic and atraumatic. Nose: Nose normal.   Mouth/Throat: Oropharynx is clear and moist. No oropharyngeal exudate. Eyes: Pupils are equal and round. Conjunctivae and EOM are normal. No scleral icterus. Neck: Normal range of motion. Neck supple. No JVD. No appreciable thyromegaly. Cardiovascular: Normal rate, regular rhythm, normal heart sounds and intact distal pulses. Exam reveals no gallop, murmurs or friction rub. Pulmonary/Chest: Effort normal and breath sounds normal. No respiratory distress. No wheezes. Abdominal: Soft. Bowel sounds are normal. No organomegally or masses. No tenderness. There is no rebound and no guarding. Musculoskeletal: Normal range of motion. No edema or tenderness. Lymphadenopathy: No cervical, axillary or inguinal lymphadenopathy. Neurological: Alert and oriented to person, place, and time. Cranial nerves are intact. Neurological exam is nonfocal  Skin: Skin is warm and dry. No rash noted. No erythema. No pallor. Psychiatric: Judgment normal.     Labs:  BMP:   Recent Labs     12/17/20  0941      K 4.1      CO2 25   BUN 23*   CREATININE 1.8*   CALCIUM 10.4*     CBC:   Recent Labs     12/17/20  0957   WBC 8.44   HGB 11.5*   HCT 33.6*   .7*        LIVER PROFILE:   Recent Labs     12/17/20  0941   AST 71*   ALT 18*   BILITOT 0.5   ALKPHOS 131*     PT/INR: No results for input(s): PROTIME, INR in the last 72 hours. APTT: No results for input(s): APTT in the last 72 hours. BNP:  No results for input(s): BNP in the last 72 hours. Ionized Calcium:No results for input(s): IONCA in the last 72 hours. Magnesium:No results for input(s): MG in the last 72 hours. Phosphorus:No results for input(s): PHOS in the last 72 hours. HgbA1C: No results for input(s): LABA1C in the last 72 hours. Hepatic:   Recent Labs     12/17/20  0941   ALKPHOS 131*   ALT 18*   AST 71*   PROT 6.6   BILITOT 0.5   LABALBU 3.1*     Lactic Acid: No results for input(s): LACTA in the last 72 hours. Troponin: No results for input(s): CKTOTAL, CKMB, TROPONINT in the last 72 hours. ABGs: No results for input(s): PH, PCO2, PO2, HCO3, O2SAT in the last 72 hours. CRP:  No results for input(s): CRP in the last 72 hours. Sed Rate:  No results for input(s): SEDRATE in the last 72 hours. Cultures:   No results for input(s): CULTURE in the last 72 hours. Radiology reports as per the Radiologist  Radiology: No results found. ASSESSMENT AND PLAN:  #1.    Maryhelen Epley is a 68 y.o.  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    · CKD and chemotherapy associated anemia, on Procrit  · BPH on Flomax · Orthostatic hypotension medications managed by Dr. Ruben Nevarez    He completed 4 cycles of combination chemotherapy with carboplatin, Keytruda, Alimta.    Most recently Sophia has been receiving  maintenance therapy with Keytruda and Alimta every 3 weeks. He has CKD associated anemia responding to Procrit.     Prabhjot has benign prostatic hypertrophy (BPH)  that is stable on Flomax. Orthostatic hypotension resolved with adjustment of metoprolol by Dr. Ruben Nevarez   He takes Tambocor, metoprolol without new cardiac issues. Protonix continues without any new exacerbations of GERD. Lower extremity edema overall has actually improved some.     Prabhjot received cycle #25 of Keytruda/Alimta on 10/29/2020. Treatment has been held since that time. CT chest without contrast on 11/12/2020 at [facility] ws compared to 1/9/2020 and documented:  · Mixed response therapy with interval decrease in size in the RIGHT upper lobe pulmonary nodule but increase in size and mediastinal lymph nodes. · In addition, there is severe emphysema with findings of severe pulmonary fibrosis. Interval worsening of interstitial opacities bilaterally as well as suggestion of some pleural nodularity. Lymphangitic spread of malignancy should be considered. · Small pleural effusion on the RIGHT is new     CT abdomen and pelvis without contrast on 11/12/2020 at [facility] was compared to 7/16/2020 and documented:  · The aortic caval node described on the comparison study is not significantly changed in size when measured at the same location. · Nonobstructing renal calculus on the LEFT. No change in the indeterminate renal lesions on the LEFT, likely cysts. · Nonspecific free fluid in the pelvis, new since the prior study and there is some mild nonspecific mesenteric haziness    Cycle #26 11/12/2020 but held due to issues of dyspnea and shortness of breath. · In addition, there is severe emphysema with findings of severe pulmonary fibrosis. Interval worsening of interstitial opacities bilaterally as well as suggestion of some pleural nodularity. Lymphangitic spread of malignancy should be considered. · Small pleural effusion on the RIGHT is new     CT abdomen and pelvis without contrast on 11/12/2020 at [facility] was compared to 7/16/2020 and documented:  · The aortic caval node described on the comparison study is not significantly changed in size when measured at the same location. · Nonobstructing renal calculus on the LEFT. No change in the indeterminate renal lesions on the LEFT, likely cysts. · Nonspecific free fluid in the pelvis, new since the prior study and there is some mild nonspecific mesenteric haziness    Cycle #26 was due on 11/12/2020 but held due to issues of dyspnea and shortness of breath. A course of prednisone was initiated and further chemotherapy was held, unable to give Keytruda while on steroids. CBC today (12/17/2020) has a WBC of 8.44 with a hemoglobin of 11.5 and a platelet count 623,541. He has pulmonary fibrosis as documented on a CT scan of the chest without contrast at [facility] on 11/12/2020. He does not have fever although he may have a degree of bronchitis. He is having cough. He has bilateral pleural friction rubs on physical examination. I am going to give him a chest x-ray and if there is nothing new but otherwise just chronic fibrosis, I will give him another trial of steroids with a Medrol Dosepak and cover with Levaquin as well. I am going to hold off on further chemoimmunotherapy until I can establish that he has a good pulmonary reserve at baseline. If Mr. Mirna Pressley continues to have minimal pulmonary reserve, I may need to discontinue immunotherapy completely to avoid further potential toxicities of the lungs. This was discussed with him at length. Treatment for this possible pulmonary toxicity due to chemoimmunotherapy is being reinitiated at this time and I will see him back in follow-up in a couple of weeks to reassess how he did with steroids and antibiotics and how he is doing at that time for final decision. 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         Dimple HINSON LPN am scribing for Andrade Price MD. Electronically signed by Julianna Herbert LPN on 60/23/3890 at 5:05 PM       Anna Patel was seen today for lung cancer. 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 Return in about 2 weeks (around 12/31/2020) for treatment & see Dr. Marilyn Ascencio. I, Dr. Nadira Enriquez, personally performed the services described in this documentation as scribed by Julianna Herbert LPN in my presence, and it is both accurate and complete.

## 2020-12-17 NOTE — TELEPHONE ENCOUNTER
Spoke with Lobo Ferreira regarding cxr and Dr Benoit Garcia recommendation for the following Rxs:  Medrol DosePak and Levaquin x 7 days. Sophia verbalized understanding.

## 2020-12-29 RX ORDER — DEXAMETHASONE SODIUM PHOSPHATE 10 MG/ML
10 INJECTION, SOLUTION INTRAMUSCULAR; INTRAVENOUS ONCE
Status: CANCELLED | OUTPATIENT
Start: 2021-01-13

## 2020-12-29 RX ORDER — METHYLPREDNISOLONE SODIUM SUCCINATE 125 MG/2ML
125 INJECTION, POWDER, LYOPHILIZED, FOR SOLUTION INTRAMUSCULAR; INTRAVENOUS PRN
Status: CANCELLED | OUTPATIENT
Start: 2021-01-13

## 2020-12-29 RX ORDER — SODIUM CHLORIDE 9 MG/ML
20 INJECTION, SOLUTION INTRAVENOUS ONCE
Status: CANCELLED | OUTPATIENT
Start: 2021-01-13 | End: 2020-12-30

## 2020-12-29 RX ORDER — HEPARIN SODIUM (PORCINE) LOCK FLUSH IV SOLN 100 UNIT/ML 100 UNIT/ML
500 SOLUTION INTRAVENOUS PRN
Status: CANCELLED | OUTPATIENT
Start: 2021-01-13

## 2020-12-29 RX ORDER — CYANOCOBALAMIN 1000 UG/ML
1000 INJECTION INTRAMUSCULAR; SUBCUTANEOUS ONCE
Status: CANCELLED | OUTPATIENT
Start: 2021-01-13 | End: 2020-12-30

## 2020-12-29 RX ORDER — EPINEPHRINE 1 MG/ML
0.3 INJECTION, SOLUTION, CONCENTRATE INTRAVENOUS PRN
Status: CANCELLED | OUTPATIENT
Start: 2021-01-13

## 2020-12-29 RX ORDER — SODIUM CHLORIDE 0.9 % (FLUSH) 0.9 %
10 SYRINGE (ML) INJECTION PRN
Status: CANCELLED | OUTPATIENT
Start: 2021-01-13

## 2020-12-29 RX ORDER — PALONOSETRON 0.05 MG/ML
0.25 INJECTION, SOLUTION INTRAVENOUS ONCE
Status: CANCELLED | OUTPATIENT
Start: 2021-01-13

## 2020-12-29 RX ORDER — SODIUM CHLORIDE 0.9 % (FLUSH) 0.9 %
5 SYRINGE (ML) INJECTION PRN
Status: CANCELLED | OUTPATIENT
Start: 2021-01-13

## 2020-12-29 RX ORDER — DIPHENHYDRAMINE HYDROCHLORIDE 50 MG/ML
50 INJECTION INTRAMUSCULAR; INTRAVENOUS PRN
Status: CANCELLED | OUTPATIENT
Start: 2021-01-13

## 2020-12-30 ENCOUNTER — OFFICE VISIT (OUTPATIENT)
Dept: HEMATOLOGY | Age: 80
End: 2020-12-30
Payer: MEDICARE

## 2020-12-30 ENCOUNTER — HOSPITAL ENCOUNTER (OUTPATIENT)
Dept: INFUSION THERAPY | Age: 80
Discharge: HOME | End: 2020-12-30
Payer: MEDICARE

## 2020-12-30 ENCOUNTER — TRANSCRIBE ORDERS (OUTPATIENT)
Dept: ADMINISTRATIVE | Facility: HOSPITAL | Age: 80
End: 2020-12-30

## 2020-12-30 VITALS
BODY MASS INDEX: 26.9 KG/M2 | HEART RATE: 95 BPM | WEIGHT: 156.7 LBS | SYSTOLIC BLOOD PRESSURE: 166 MMHG | DIASTOLIC BLOOD PRESSURE: 99 MMHG | TEMPERATURE: 97.1 F | RESPIRATION RATE: 17 BRPM

## 2020-12-30 DIAGNOSIS — C78.89 SECONDARY MALIGNANT NEOPLASM OF OTHER DIGESTIVE ORGANS (HCC): ICD-10-CM

## 2020-12-30 DIAGNOSIS — C34.90 NON-SMALL CELL LUNG CANCER, UNSPECIFIED LATERALITY (HCC): Primary | ICD-10-CM

## 2020-12-30 DIAGNOSIS — C78.30 SECONDARY MALIGNANT NEOPLASM OF RESPIRATORY AND DIGESTIVE SYSTEMS (HCC): ICD-10-CM

## 2020-12-30 DIAGNOSIS — C34.11 MALIGNANT NEOPLASM OF UPPER LOBE OF RIGHT LUNG (HCC): Primary | ICD-10-CM

## 2020-12-30 DIAGNOSIS — C78.89 SECONDARY MALIGNANT NEOPLASM OF RESPIRATORY AND DIGESTIVE SYSTEMS (HCC): ICD-10-CM

## 2020-12-30 DIAGNOSIS — D63.1 ANEMIA, CHRONIC RENAL FAILURE, STAGE 3 (MODERATE) (HCC): ICD-10-CM

## 2020-12-30 DIAGNOSIS — N18.30 ANEMIA, CHRONIC RENAL FAILURE, STAGE 3 (MODERATE) (HCC): ICD-10-CM

## 2020-12-30 LAB
BASOPHILS ABSOLUTE: 0.02 K/UL (ref 0.01–0.08)
BASOPHILS RELATIVE PERCENT: 0.3 % (ref 0.1–1.2)
EOSINOPHILS ABSOLUTE: 0.02 K/UL (ref 0.04–0.54)
EOSINOPHILS RELATIVE PERCENT: 0.3 % (ref 0.7–7)
HCT VFR BLD CALC: 34.9 % (ref 40.1–51)
HEMOGLOBIN: 11.4 G/DL (ref 13.7–17.5)
LYMPHOCYTES ABSOLUTE: 0.61 K/UL (ref 1.18–3.74)
LYMPHOCYTES RELATIVE PERCENT: 8.1 % (ref 19.3–53.1)
MCH RBC QN AUTO: 33.5 PG (ref 25.7–32.2)
MCHC RBC AUTO-ENTMCNC: 32.7 G/DL (ref 32.3–36.5)
MCV RBC AUTO: 102.6 FL (ref 79–92.2)
MONOCYTES ABSOLUTE: 0.66 K/UL (ref 0.24–0.82)
MONOCYTES RELATIVE PERCENT: 8.8 % (ref 4.7–12.5)
NEUTROPHILS ABSOLUTE: 6.2 K/UL (ref 1.56–6.13)
NEUTROPHILS RELATIVE PERCENT: 82.5 % (ref 34–71.1)
PDW BLD-RTO: 17.1 % (ref 11.6–14.4)
PLATELET # BLD: 177 K/UL (ref 163–337)
PMV BLD AUTO: 10.9 FL (ref 7.4–10.4)
RBC # BLD: 3.4 M/UL (ref 4.63–6.08)
WBC # BLD: 7.51 K/UL (ref 4.23–9.07)

## 2020-12-30 PROCEDURE — G8484 FLU IMMUNIZE NO ADMIN: HCPCS | Performed by: INTERNAL MEDICINE

## 2020-12-30 PROCEDURE — 99215 OFFICE O/P EST HI 40 MIN: CPT | Performed by: INTERNAL MEDICINE

## 2020-12-30 PROCEDURE — G8428 CUR MEDS NOT DOCUMENT: HCPCS | Performed by: INTERNAL MEDICINE

## 2020-12-30 PROCEDURE — 85025 COMPLETE CBC W/AUTO DIFF WBC: CPT

## 2020-12-30 PROCEDURE — 1036F TOBACCO NON-USER: CPT | Performed by: INTERNAL MEDICINE

## 2020-12-30 PROCEDURE — 96523 IRRIG DRUG DELIVERY DEVICE: CPT

## 2020-12-30 PROCEDURE — 2580000003 HC RX 258: Performed by: INTERNAL MEDICINE

## 2020-12-30 PROCEDURE — 1123F ACP DISCUSS/DSCN MKR DOCD: CPT | Performed by: INTERNAL MEDICINE

## 2020-12-30 PROCEDURE — 6360000002 HC RX W HCPCS: Performed by: INTERNAL MEDICINE

## 2020-12-30 PROCEDURE — G8417 CALC BMI ABV UP PARAM F/U: HCPCS | Performed by: INTERNAL MEDICINE

## 2020-12-30 PROCEDURE — 4040F PNEUMOC VAC/ADMIN/RCVD: CPT | Performed by: INTERNAL MEDICINE

## 2020-12-30 RX ORDER — HEPARIN SODIUM (PORCINE) LOCK FLUSH IV SOLN 100 UNIT/ML 100 UNIT/ML
500 SOLUTION INTRAVENOUS PRN
Status: DISCONTINUED | OUTPATIENT
Start: 2020-12-30 | End: 2020-12-31 | Stop reason: HOSPADM

## 2020-12-30 RX ORDER — SODIUM CHLORIDE 0.9 % (FLUSH) 0.9 %
20 SYRINGE (ML) INJECTION PRN
Status: DISCONTINUED | OUTPATIENT
Start: 2020-12-30 | End: 2020-12-31 | Stop reason: HOSPADM

## 2020-12-30 RX ORDER — HEPARIN SODIUM (PORCINE) LOCK FLUSH IV SOLN 100 UNIT/ML 100 UNIT/ML
500 SOLUTION INTRAVENOUS PRN
Status: CANCELLED | OUTPATIENT
Start: 2020-12-30

## 2020-12-30 RX ORDER — SODIUM CHLORIDE 0.9 % (FLUSH) 0.9 %
20 SYRINGE (ML) INJECTION PRN
Status: CANCELLED | OUTPATIENT
Start: 2020-12-30

## 2020-12-30 RX ORDER — SODIUM CHLORIDE 0.9 % (FLUSH) 0.9 %
10 SYRINGE (ML) INJECTION PRN
Status: CANCELLED | OUTPATIENT
Start: 2020-12-30

## 2020-12-30 RX ADMIN — SODIUM CHLORIDE, PRESERVATIVE FREE 20 ML: 5 INJECTION INTRAVENOUS at 10:27

## 2020-12-30 RX ADMIN — HEPARIN 500 UNITS: 100 SYRINGE at 10:27

## 2021-01-13 ENCOUNTER — HOSPITAL ENCOUNTER (OUTPATIENT)
Dept: INFUSION THERAPY | Age: 81
Discharge: HOME | End: 2021-01-13
Payer: MEDICARE

## 2021-01-13 VITALS
OXYGEN SATURATION: 95 % | DIASTOLIC BLOOD PRESSURE: 70 MMHG | HEART RATE: 102 BPM | TEMPERATURE: 97.5 F | BODY MASS INDEX: 26.8 KG/M2 | WEIGHT: 157 LBS | SYSTOLIC BLOOD PRESSURE: 120 MMHG | HEIGHT: 64 IN

## 2021-01-13 DIAGNOSIS — N18.30 ANEMIA, CHRONIC RENAL FAILURE, STAGE 3 (MODERATE) (HCC): ICD-10-CM

## 2021-01-13 DIAGNOSIS — D63.1 ANEMIA, CHRONIC RENAL FAILURE, STAGE 3 (MODERATE) (HCC): ICD-10-CM

## 2021-01-13 DIAGNOSIS — C34.90 NON-SMALL CELL LUNG CANCER, UNSPECIFIED LATERALITY (HCC): ICD-10-CM

## 2021-01-13 LAB
BASOPHILS ABSOLUTE: 0.04 K/UL (ref 0.01–0.08)
BASOPHILS RELATIVE PERCENT: 0.6 % (ref 0.1–1.2)
EOSINOPHILS ABSOLUTE: 0.09 K/UL (ref 0.04–0.54)
EOSINOPHILS RELATIVE PERCENT: 1.3 % (ref 0.7–7)
HCT VFR BLD CALC: 40.6 % (ref 40.1–51)
HEMOGLOBIN: 13.1 G/DL (ref 13.7–17.5)
LYMPHOCYTES ABSOLUTE: 1.72 K/UL (ref 1.18–3.74)
LYMPHOCYTES RELATIVE PERCENT: 25.7 % (ref 19.3–53.1)
MCH RBC QN AUTO: 32.8 PG (ref 25.7–32.2)
MCHC RBC AUTO-ENTMCNC: 32.3 G/DL (ref 32.3–36.5)
MCV RBC AUTO: 101.5 FL (ref 79–92.2)
MONOCYTES ABSOLUTE: 0.87 K/UL (ref 0.24–0.82)
MONOCYTES RELATIVE PERCENT: 13 % (ref 4.7–12.5)
NEUTROPHILS ABSOLUTE: 3.98 K/UL (ref 1.56–6.13)
NEUTROPHILS RELATIVE PERCENT: 59.4 % (ref 34–71.1)
PDW BLD-RTO: 17.9 % (ref 11.6–14.4)
PLATELET # BLD: 139 K/UL (ref 163–337)
PMV BLD AUTO: 11.6 FL (ref 7.4–10.4)
RBC # BLD: 4 M/UL (ref 4.63–6.08)
WBC # BLD: 6.7 K/UL (ref 4.23–9.07)

## 2021-01-13 PROCEDURE — 85025 COMPLETE CBC W/AUTO DIFF WBC: CPT

## 2021-01-14 RX ORDER — FOLIC ACID 1 MG/1
TABLET ORAL
Qty: 30 TABLET | Refills: 0 | Status: SHIPPED | OUTPATIENT
Start: 2021-01-14 | End: 2021-02-12

## 2021-01-27 ENCOUNTER — HOSPITAL ENCOUNTER (OUTPATIENT)
Dept: INFUSION THERAPY | Age: 81
Discharge: HOME | End: 2021-01-27
Payer: MEDICARE

## 2021-01-27 VITALS
SYSTOLIC BLOOD PRESSURE: 122 MMHG | TEMPERATURE: 97.8 F | HEART RATE: 92 BPM | OXYGEN SATURATION: 94 % | DIASTOLIC BLOOD PRESSURE: 68 MMHG

## 2021-01-27 DIAGNOSIS — D63.1 ANEMIA, CHRONIC RENAL FAILURE, STAGE 3 (MODERATE) (HCC): ICD-10-CM

## 2021-01-27 DIAGNOSIS — N18.30 ANEMIA, CHRONIC RENAL FAILURE, STAGE 3 (MODERATE) (HCC): ICD-10-CM

## 2021-01-27 DIAGNOSIS — C34.90 NON-SMALL CELL LUNG CANCER, UNSPECIFIED LATERALITY (HCC): Primary | ICD-10-CM

## 2021-01-27 DIAGNOSIS — C78.89 SECONDARY MALIGNANT NEOPLASM OF OTHER DIGESTIVE ORGANS (HCC): ICD-10-CM

## 2021-01-27 LAB
BASOPHILS ABSOLUTE: 0.03 K/UL (ref 0.01–0.08)
BASOPHILS RELATIVE PERCENT: 0.5 % (ref 0.1–1.2)
EOSINOPHILS ABSOLUTE: 0.1 K/UL (ref 0.04–0.54)
EOSINOPHILS RELATIVE PERCENT: 1.7 % (ref 0.7–7)
HCT VFR BLD CALC: 42.5 % (ref 40.1–51)
HEMOGLOBIN: 13.3 G/DL (ref 13.7–17.5)
LYMPHOCYTES ABSOLUTE: 1.47 K/UL (ref 1.18–3.74)
LYMPHOCYTES RELATIVE PERCENT: 24.3 % (ref 19.3–53.1)
MCH RBC QN AUTO: 31.7 PG (ref 25.7–32.2)
MCHC RBC AUTO-ENTMCNC: 31.3 G/DL (ref 32.3–36.5)
MCV RBC AUTO: 101.2 FL (ref 79–92.2)
MONOCYTES ABSOLUTE: 0.8 K/UL (ref 0.24–0.82)
MONOCYTES RELATIVE PERCENT: 13.2 % (ref 4.7–12.5)
NEUTROPHILS ABSOLUTE: 3.64 K/UL (ref 1.56–6.13)
NEUTROPHILS RELATIVE PERCENT: 60.3 % (ref 34–71.1)
PDW BLD-RTO: 17.6 % (ref 11.6–14.4)
PLATELET # BLD: 136 K/UL (ref 163–337)
PMV BLD AUTO: 11.5 FL (ref 7.4–10.4)
RBC # BLD: 4.2 M/UL (ref 4.63–6.08)
WBC # BLD: 6.04 K/UL (ref 4.23–9.07)

## 2021-01-27 PROCEDURE — 85025 COMPLETE CBC W/AUTO DIFF WBC: CPT

## 2021-01-27 PROCEDURE — 2580000003 HC RX 258: Performed by: INTERNAL MEDICINE

## 2021-01-27 PROCEDURE — 96523 IRRIG DRUG DELIVERY DEVICE: CPT

## 2021-01-27 PROCEDURE — 6360000002 HC RX W HCPCS: Performed by: INTERNAL MEDICINE

## 2021-01-27 RX ORDER — SODIUM CHLORIDE 0.9 % (FLUSH) 0.9 %
10 SYRINGE (ML) INJECTION PRN
Status: DISCONTINUED | OUTPATIENT
Start: 2021-01-27 | End: 2021-01-28 | Stop reason: HOSPADM

## 2021-01-27 RX ORDER — SODIUM CHLORIDE 0.9 % (FLUSH) 0.9 %
10 SYRINGE (ML) INJECTION PRN
Status: CANCELLED | OUTPATIENT
Start: 2021-01-27

## 2021-01-27 RX ORDER — HEPARIN SODIUM (PORCINE) LOCK FLUSH IV SOLN 100 UNIT/ML 100 UNIT/ML
500 SOLUTION INTRAVENOUS PRN
Status: DISCONTINUED | OUTPATIENT
Start: 2021-01-27 | End: 2021-01-28 | Stop reason: HOSPADM

## 2021-01-27 RX ORDER — SODIUM CHLORIDE 0.9 % (FLUSH) 0.9 %
20 SYRINGE (ML) INJECTION PRN
Status: CANCELLED | OUTPATIENT
Start: 2021-01-27

## 2021-01-27 RX ORDER — HEPARIN SODIUM (PORCINE) LOCK FLUSH IV SOLN 100 UNIT/ML 100 UNIT/ML
500 SOLUTION INTRAVENOUS PRN
Status: CANCELLED | OUTPATIENT
Start: 2021-01-27

## 2021-01-27 RX ADMIN — HEPARIN 500 UNITS: 100 SYRINGE at 14:16

## 2021-01-27 RX ADMIN — SODIUM CHLORIDE, PRESERVATIVE FREE 10 ML: 5 INJECTION INTRAVENOUS at 14:16

## 2021-02-10 ENCOUNTER — HOSPITAL ENCOUNTER (OUTPATIENT)
Dept: INFUSION THERAPY | Age: 81
End: 2021-02-10
Payer: MEDICARE

## 2021-02-12 RX ORDER — FOLIC ACID 1 MG/1
TABLET ORAL
Qty: 30 TABLET | Refills: 0 | Status: SHIPPED | OUTPATIENT
Start: 2021-02-12 | End: 2021-03-16

## 2021-02-24 ENCOUNTER — HOSPITAL ENCOUNTER (OUTPATIENT)
Dept: INFUSION THERAPY | Age: 81
Discharge: HOME | End: 2021-02-24
Payer: MEDICARE

## 2021-02-24 VITALS
BODY MASS INDEX: 24.87 KG/M2 | DIASTOLIC BLOOD PRESSURE: 60 MMHG | SYSTOLIC BLOOD PRESSURE: 118 MMHG | TEMPERATURE: 98.4 F | OXYGEN SATURATION: 90 % | HEART RATE: 97 BPM | WEIGHT: 145.7 LBS | HEIGHT: 64 IN

## 2021-02-24 DIAGNOSIS — C34.90 NON-SMALL CELL LUNG CANCER, UNSPECIFIED LATERALITY (HCC): ICD-10-CM

## 2021-02-24 DIAGNOSIS — N18.30 ANEMIA, CHRONIC RENAL FAILURE, STAGE 3 (MODERATE) (HCC): ICD-10-CM

## 2021-02-24 DIAGNOSIS — D63.1 ANEMIA, CHRONIC RENAL FAILURE, STAGE 3 (MODERATE) (HCC): ICD-10-CM

## 2021-02-24 LAB
BASOPHILS ABSOLUTE: 0.04 K/UL (ref 0.01–0.08)
BASOPHILS RELATIVE PERCENT: 0.5 % (ref 0.1–1.2)
EOSINOPHILS ABSOLUTE: 0.11 K/UL (ref 0.04–0.54)
EOSINOPHILS RELATIVE PERCENT: 1.4 % (ref 0.7–7)
HCT VFR BLD CALC: 41.3 % (ref 40.1–51)
HEMOGLOBIN: 12.7 G/DL (ref 13.7–17.5)
LYMPHOCYTES ABSOLUTE: 1.6 K/UL (ref 1.18–3.74)
LYMPHOCYTES RELATIVE PERCENT: 20.5 % (ref 19.3–53.1)
MCH RBC QN AUTO: 30 PG (ref 25.7–32.2)
MCHC RBC AUTO-ENTMCNC: 30.8 G/DL (ref 32.3–36.5)
MCV RBC AUTO: 97.4 FL (ref 79–92.2)
MONOCYTES ABSOLUTE: 1.04 K/UL (ref 0.24–0.82)
MONOCYTES RELATIVE PERCENT: 13.3 % (ref 4.7–12.5)
NEUTROPHILS ABSOLUTE: 5.02 K/UL (ref 1.56–6.13)
NEUTROPHILS RELATIVE PERCENT: 64.3 % (ref 34–71.1)
PDW BLD-RTO: 17.2 % (ref 11.6–14.4)
PLATELET # BLD: 184 K/UL (ref 163–337)
PMV BLD AUTO: 11.4 FL (ref 7.4–10.4)
RBC # BLD: 4.24 M/UL (ref 4.63–6.08)
WBC # BLD: 7.81 K/UL (ref 4.23–9.07)

## 2021-02-24 PROCEDURE — 85025 COMPLETE CBC W/AUTO DIFF WBC: CPT

## 2021-03-03 ENCOUNTER — OFFICE VISIT (OUTPATIENT)
Dept: CARDIOLOGY CLINIC | Age: 81
End: 2021-03-03
Payer: MEDICARE

## 2021-03-03 VITALS
BODY MASS INDEX: 25.1 KG/M2 | WEIGHT: 147 LBS | SYSTOLIC BLOOD PRESSURE: 118 MMHG | HEIGHT: 64 IN | DIASTOLIC BLOOD PRESSURE: 58 MMHG | HEART RATE: 88 BPM

## 2021-03-03 DIAGNOSIS — I25.10 CORONARY ARTERY DISEASE INVOLVING NATIVE CORONARY ARTERY OF NATIVE HEART WITHOUT ANGINA PECTORIS: Primary | ICD-10-CM

## 2021-03-03 DIAGNOSIS — I47.29 NSVT (NONSUSTAINED VENTRICULAR TACHYCARDIA): ICD-10-CM

## 2021-03-03 DIAGNOSIS — I10 ESSENTIAL HYPERTENSION: ICD-10-CM

## 2021-03-03 PROCEDURE — G8427 DOCREV CUR MEDS BY ELIG CLIN: HCPCS | Performed by: CLINICAL NURSE SPECIALIST

## 2021-03-03 PROCEDURE — G8417 CALC BMI ABV UP PARAM F/U: HCPCS | Performed by: CLINICAL NURSE SPECIALIST

## 2021-03-03 PROCEDURE — 99213 OFFICE O/P EST LOW 20 MIN: CPT | Performed by: CLINICAL NURSE SPECIALIST

## 2021-03-03 PROCEDURE — 1036F TOBACCO NON-USER: CPT | Performed by: CLINICAL NURSE SPECIALIST

## 2021-03-03 PROCEDURE — 1123F ACP DISCUSS/DSCN MKR DOCD: CPT | Performed by: CLINICAL NURSE SPECIALIST

## 2021-03-03 PROCEDURE — G8484 FLU IMMUNIZE NO ADMIN: HCPCS | Performed by: CLINICAL NURSE SPECIALIST

## 2021-03-03 PROCEDURE — 4040F PNEUMOC VAC/ADMIN/RCVD: CPT | Performed by: CLINICAL NURSE SPECIALIST

## 2021-03-03 NOTE — PROGRESS NOTES
[facility]  [facility] [address]  [id number]  Phone: [phone]  Fax: [phone]    OFFICE VISIT:  3/3/2021    [address]: 1940    Reason For Visit:  Conor Garay is a 68 y.o. male who is here for 6 Month Follow-Up (no cardiac symptoms) and Hypertension  History of mild coronary disease with last heart catheterization 3/15/2019. Diffuse distal disease and very small terminal left circumflex with normal LV function. Had episode of VT during EGD in 2019  2D echo 9/1/2020 shows normal LV size and function with EF 55 to 60%. Mild MR and mild mitral annular calcification. Mildly thickened aortic valve leaflets with preserved mobility. Elevated RVSP at 49 mmHg  Has stage IV metastatic adenocarcinoma of the lung on maintenance therapy    He has SOB that is fairly stable. States he does OK at rest. Has O2 to use as needed. Subjective  Fidello denies exertional chest pain,  orthopnea, paroxysmal nocturnal dyspnea, syncope, presyncope, arrhythmia,  and fatigue. Has lower leg edema that is stable the patient denies numbness or weakness to suggest cerebrovascular accident or transient ischemic attack. Kevin Mcclure MD is PCP and follows labs.   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)     Abnormal weight loss     Other fatigue     Cellulitis of unspecified part of limb     Non-sustained ventricular tachycardia (HCC)     Gout     Duodenal ulcer     Malignant neoplasm of upper lobe, right bronchus or lung ([facility] [address]  Non-small cell lung cancer ([facility] [address] 06/04/2019    Pneumonia due to infectious organism 03/18/2019    Cellulitis of right upper limb 03/18/2019    Stage 3 chronic kidney disease 03/18/2019    Severe protein-calorie malnutrition ([address] 03/18/2019    Normocytic anemia 03/18/2019    Hydronephrosis, left     Right upper quadrant abdominal mass     Essential hypertension 10/02/2017    Cardiomyopathy, nonischemic ([address] 10/02/2017    NSVT (nonsustained ventricular tachycardia) ([address] 10/02/2017     Past Medical History:   Diagnosis Date    Abnormal weight loss     Anemia     Anemia, unspecified     Blind left eye     Cardiomyopathy ([address]     with compensated CHF    Cellulitis     Cellulitis of unspecified part of limb     Chronic kidney disease, stage IV (severe) ([address]     Dr. Trevino Courser COPD (chronic obstructive pulmonary disease) ([facility] [address]     Duodenal ulcer     Essential hypertension 10/2/2017    Eye injury     at age 12 from penetrating injury    Gout     HTN (hypertension)     Hydronephrosis     Hydronephrosis, left     Liver abscess     resolved    Lung nodule     Malignant neoplasm ([address]     Malignant neoplasm of upper lobe, right bronchus or lung (HCC)     MGUS (monoclonal gammopathy of unknown significance)     secondary to an IgG kappa.   IgG level in 2,000 range    Monoclonal gammopathy     NICM (nonischemic cardiomyopathy) (HCC)     Non-small cell lung cancer ([address] 6/4/2019    Non-sustained ventricular tachycardia (HCC)     NSVT (nonsustained ventricular tachycardia) (HCC)     Osteoarthritis     Other fatigue     Other iron deficiency anemias     Secondary malignant neoplasm of other digestive organs ([address]     Weight loss      Past Surgical History:   Procedure Laterality Date    BRONCHOSCOPY  11/27/2018    dx of adenocarcinoma RUL  Dr. Kera Sanchez  ENDOSCOPY, COLON, DIAGNOSTIC  2019    aborted d/t vtach    EYE SURGERY Left     EYE SURGERY  1964    FOOT SURGERY      removal of joint from right toe from drilling accident, [address]  10/29/2003    with resection  Mitul-en-Y proecedure  DR. Antoine Pinzon    TUNNELED VENOUS PORT PLACEMENT      UPPER GASTROINTESTINAL ENDOSCOPY N/A 3/13/2019    EGD W/EUS FNA performed by Bin Childs MD at [address] [facility]     Family History   Problem Relation Age of Onset    Osteoporosis Mother     High Blood Pressure Mother     Asthma Mother     Bleeding Prob Father     Cancer Father         leukemia    Asthma Brother      Social History     Tobacco Use    Smoking status: Former Smoker     Packs/day: 2.00     Years: 40.00     Pack years: 80.00     Types: Cigarettes     Quit date: 10/29/2003     Years since quittin.3    Smokeless tobacco: Never Used   Substance Use Topics    Alcohol use: No      Current Outpatient Medications   Medication Sig Dispense Refill    folic acid (FOLVITE) 1 MG tablet Take 1 tablet by mouth once daily 30 tablet 0    SM STOOL SOFTENER/LAXATIVE 8.6-50 MG per tablet Take 2 tablets by mouth twice daily 120 tablet 0    dexamethasone (DECADRON) 4 MG tablet TAKE 1 TABLET BY MOUTH TWICE DAILY DAY  BEFORE  CHEMO,  THE  DAY  OF  CHEMO  AND  THE  DAY  AFTER  --EVERY  21  DAYS 24 tablet 0    flecainide (TAMBOCOR) 50 MG tablet Take 50 mg by mouth 2 times daily      sodium bicarbonate 650 MG tablet Take 650 mg by mouth 2 times daily      pantoprazole (PROTONIX) 40 MG tablet Take 40 mg by mouth daily      tamsulosin (FLOMAX) 0.4 MG capsule Take 0.4 mg by mouth daily      Multiple Vitamin (MULTI VITAMIN DAILY PO) Take by mouth daily       spironolactone (ALDACTONE) 25 MG tablet Take 25 mg by mouth daily       No current facility-administered medications for this visit.       Allergies: Penicillins    Review of Systems Constitutional  no significant activity change, appetite change, or unexpected weight change. No fever, chills or diaphoresis. No fatigue. HEENT  no significant rhinorrhea or epistaxis. No tinnitus or significant hearing loss. Eyes  no sudden vision change or amaurosis. Respiratory  no significant wheezing, stridor, apnea or cough. + dyspnea on exertion+ shortness of breath. Cardiovascular  no exertional chest pain, orthopnea or PND. No sensation of arrhythmia or slow heart rate. No claudication +leg edema. Gastrointestinal  no abdominal swelling or pain. No blood in stool. No severe constipation, diarrhea, nausea, or vomiting. Genitourinary  no difficulty urinating, dysuria, frequency, or urgency. No flank pain or hematuria. Musculoskeletal  no back pain, gait disturbance, or myalgia. Skin  no color change or rash. No pallor. No new surgical incision. Neurologic  no speech difficulty, facial asymmetry or lateralizing weakness. No seizures, presyncope, syncope, or significant dizziness. Hematologic  no easy bruising or excessive bleeding. Psychiatric  no severe anxiety or insomnia. No confusion. All other review of systems are negative. Objective  Vital Signs - BP (!) 118/58   Pulse 88   Ht 5' 4\" (1.626 m)   Wt 147 lb (66.7 kg)   BMI 25.23 kg/m²   General - Fidello is alert, cooperative, and pleasant. Well groomed. No acute distress. Body habitus is normal.  HEENT  The head is normocephalic. No circumoral cyanosis. Dentition is normal.   EYES -  No Xanthelasma, no arcus senilis, no conjunctival hemorrhages or discharge. Neck - Supple, without increased jugular venous pressures. No carotid bruits. No mass. Respiratory - Lungs are clear bilaterally. No wheezes or rales. Normal effort without use of accessory muscles. Cardiovascular  Heart has regular rhythm and rate. No murmurs, rubs or gallops. + pedal pulses and no varicosities. Abdominal -  Soft, nontender, nondistended. Bowel sounds are intact. Extremities - No clubbing, cyanosis,  + ankle edema. Musculoskeletal -  No clubbing . No Osler's nodes. Gait normal .  No kyphosis or scoliosis. Skin -  no statis ulcers or dermatitis. Neurological - No focal signs are identified. Oriented to person, place and time. Psychiatric -  Appropriate affect and mood. Assessment:     Diagnosis Orders   1. Coronary artery disease involving native coronary artery of native heart without angina pectoris     2. Essential hypertension     3. NSVT (nonsustained ventricular tachycardia) ([facility])       Data:  BP Readings from Last 3 Encounters:   03/03/21 (!) 118/58   02/24/21 118/60   01/27/21 122/68    Pulse Readings from Last 3 Encounters:   03/03/21 88   02/24/21 97   01/27/21 92        Wt Readings from Last 3 Encounters:   03/03/21 147 lb (66.7 kg)   02/24/21 145 lb 11.2 oz (66.1 kg)   01/13/21 157 lb (71.2 kg)       Blood pressure and heart rate controlled. Medical management includes  Flecainide. Takes no medication for blood pressure. Does take spironolactone. Peripheral edema stable  On maintenance management for lung cancer. Overall patient is doing fairly well. States taking medications as prescribed  Stable cardiovascular status. No evidence of overt heart failure, angina or dysrhythmia. Plan    Follow up in 9 mos with Tequila Ochoa  Call with any questions or concerns  Follow up with Mckay Treviño MD for non cardiac problems  Report any new problems  Cardiovascular Fitness-Exercise as tolerated. Strive for 30 minutes of exercise most days of the week. Cardiac / Healthy Diet  Continue current medications as directed  Continue plan of treatment  It is always recommended that you bring your medications bottles with you to each visit - this is for your safety!        ALEX Sequeira

## 2021-03-03 NOTE — PATIENT INSTRUCTIONS
Follow up in 9 mos with Reza Marquez  Call with any questions or concerns  Follow up with Mayela Loredo MD for non cardiac problems  Report any new problems  Cardiovascular Fitness-Exercise as tolerated. Strive for 30 minutes of exercise most days of the week. Cardiac / Healthy Diet  Continue current medications as directed  Continue plan of treatment  It is always recommended that you bring your medications bottles with you to each visit - this is for your safety!

## 2021-03-10 ENCOUNTER — HOSPITAL ENCOUNTER (OUTPATIENT)
Dept: INFUSION THERAPY | Age: 81
Discharge: HOME | End: 2021-03-10
Payer: MEDICARE

## 2021-03-10 VITALS
DIASTOLIC BLOOD PRESSURE: 60 MMHG | OXYGEN SATURATION: 91 % | TEMPERATURE: 97.7 F | HEART RATE: 94 BPM | HEIGHT: 64 IN | WEIGHT: 147.6 LBS | SYSTOLIC BLOOD PRESSURE: 120 MMHG | BODY MASS INDEX: 25.2 KG/M2

## 2021-03-10 DIAGNOSIS — D63.1 ANEMIA, CHRONIC RENAL FAILURE, STAGE 3 (MODERATE) (HCC): ICD-10-CM

## 2021-03-10 DIAGNOSIS — N18.30 ANEMIA, CHRONIC RENAL FAILURE, STAGE 3 (MODERATE) (HCC): ICD-10-CM

## 2021-03-10 DIAGNOSIS — C78.89 SECONDARY MALIGNANT NEOPLASM OF OTHER DIGESTIVE ORGANS (HCC): ICD-10-CM

## 2021-03-10 DIAGNOSIS — C34.90 NON-SMALL CELL LUNG CANCER, UNSPECIFIED LATERALITY (HCC): Primary | ICD-10-CM

## 2021-03-10 LAB
BASOPHILS ABSOLUTE: 0.04 K/UL (ref 0.01–0.08)
BASOPHILS RELATIVE PERCENT: 0.6 % (ref 0.1–1.2)
EOSINOPHILS ABSOLUTE: 0.23 K/UL (ref 0.04–0.54)
EOSINOPHILS RELATIVE PERCENT: 3.3 % (ref 0.7–7)
HCT VFR BLD CALC: 36.8 % (ref 40.1–51)
HEMOGLOBIN: 11.1 G/DL (ref 13.7–17.5)
LYMPHOCYTES ABSOLUTE: 1.58 K/UL (ref 1.18–3.74)
LYMPHOCYTES RELATIVE PERCENT: 22.5 % (ref 19.3–53.1)
MCH RBC QN AUTO: 29.4 PG (ref 25.7–32.2)
MCHC RBC AUTO-ENTMCNC: 30.2 G/DL (ref 32.3–36.5)
MCV RBC AUTO: 97.4 FL (ref 79–92.2)
MONOCYTES ABSOLUTE: 1 K/UL (ref 0.24–0.82)
MONOCYTES RELATIVE PERCENT: 14.2 % (ref 4.7–12.5)
NEUTROPHILS ABSOLUTE: 4.18 K/UL (ref 1.56–6.13)
NEUTROPHILS RELATIVE PERCENT: 59.4 % (ref 34–71.1)
PDW BLD-RTO: 17.2 % (ref 11.6–14.4)
PLATELET # BLD: 171 K/UL (ref 163–337)
PMV BLD AUTO: 10.5 FL (ref 7.4–10.4)
RBC # BLD: 3.78 M/UL (ref 4.63–6.08)
WBC # BLD: 7.03 K/UL (ref 4.23–9.07)

## 2021-03-10 PROCEDURE — 6360000002 HC RX W HCPCS: Performed by: INTERNAL MEDICINE

## 2021-03-10 PROCEDURE — 96523 IRRIG DRUG DELIVERY DEVICE: CPT

## 2021-03-10 PROCEDURE — 2580000003 HC RX 258: Performed by: INTERNAL MEDICINE

## 2021-03-10 PROCEDURE — 85025 COMPLETE CBC W/AUTO DIFF WBC: CPT

## 2021-03-10 RX ORDER — SODIUM CHLORIDE 0.9 % (FLUSH) 0.9 %
20 SYRINGE (ML) INJECTION PRN
Status: DISCONTINUED | OUTPATIENT
Start: 2021-03-10 | End: 2021-03-11 | Stop reason: HOSPADM

## 2021-03-10 RX ORDER — SODIUM CHLORIDE 0.9 % (FLUSH) 0.9 %
20 SYRINGE (ML) INJECTION PRN
Status: CANCELLED | OUTPATIENT
Start: 2021-03-10

## 2021-03-10 RX ORDER — HEPARIN SODIUM (PORCINE) LOCK FLUSH IV SOLN 100 UNIT/ML 100 UNIT/ML
SOLUTION INTRAVENOUS
Status: DISCONTINUED
Start: 2021-03-10 | End: 2021-03-11 | Stop reason: HOSPADM

## 2021-03-10 RX ORDER — HEPARIN SODIUM (PORCINE) LOCK FLUSH IV SOLN 100 UNIT/ML 100 UNIT/ML
500 SOLUTION INTRAVENOUS PRN
Status: CANCELLED | OUTPATIENT
Start: 2021-03-10

## 2021-03-10 RX ORDER — SODIUM CHLORIDE 0.9 % (FLUSH) 0.9 %
10 SYRINGE (ML) INJECTION PRN
Status: DISCONTINUED | OUTPATIENT
Start: 2021-03-10 | End: 2021-03-11 | Stop reason: HOSPADM

## 2021-03-10 RX ORDER — SODIUM CHLORIDE 0.9 % (FLUSH) 0.9 %
10 SYRINGE (ML) INJECTION PRN
Status: CANCELLED | OUTPATIENT
Start: 2021-03-10

## 2021-03-10 RX ORDER — HEPARIN SODIUM (PORCINE) LOCK FLUSH IV SOLN 100 UNIT/ML 100 UNIT/ML
500 SOLUTION INTRAVENOUS PRN
Status: DISCONTINUED | OUTPATIENT
Start: 2021-03-10 | End: 2021-03-11 | Stop reason: HOSPADM

## 2021-03-10 RX ADMIN — HEPARIN 1000 UNITS: 100 SYRINGE at 14:48

## 2021-03-10 RX ADMIN — SODIUM CHLORIDE, PRESERVATIVE FREE 20 ML: 5 INJECTION INTRAVENOUS at 14:48

## 2021-03-16 RX ORDER — FOLIC ACID 1 MG/1
TABLET ORAL
Qty: 30 TABLET | Refills: 0 | Status: SHIPPED | OUTPATIENT
Start: 2021-03-16 | End: 2021-04-16

## 2021-03-17 ENCOUNTER — HOSPITAL ENCOUNTER (OUTPATIENT)
Dept: CT IMAGING | Facility: HOSPITAL | Age: 81
Discharge: HOME | End: 2021-03-17
Admitting: INTERNAL MEDICINE

## 2021-03-17 DIAGNOSIS — C78.30 SECONDARY MALIGNANT NEOPLASM OF RESPIRATORY AND DIGESTIVE SYSTEMS (HCC): ICD-10-CM

## 2021-03-17 DIAGNOSIS — C34.11 MALIGNANT NEOPLASM OF UPPER LOBE OF RIGHT LUNG (HCC): ICD-10-CM

## 2021-03-17 DIAGNOSIS — C78.89 SECONDARY MALIGNANT NEOPLASM OF RESPIRATORY AND DIGESTIVE SYSTEMS (HCC): ICD-10-CM

## 2021-03-17 PROCEDURE — 74176 CT ABD & PELVIS W/O CONTRAST: CPT

## 2021-03-17 PROCEDURE — 71250 CT THORAX DX C-: CPT

## 2021-03-23 NOTE — PROGRESS NOTES
Patient:  Elie Rosenberg  YOB: 1940  Date of Service: 3/24/2021  MRN: [id number]    Primary Care Physician: Estephania Barlow MD    Chief Complaint   Patient presents with    Follow-up     Malignant neoplasm of upper lobe, right bronchus or lung [facility])        Patient Seen, Chart, Consults notes, Labs, Radiology studies reviewed. Subjective:  Elie Rosenberg is a 68 y.o.  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 · CKD and chemotherapy associated anemia, on Procrit  · BPH on Flomax  · Orthostatic hypotension medications managed by Dr. Kuldeep Powers    He completed 4 cycles of combination chemotherapy with carboplatin, Keytruda, Alimta on 7/5/2019.    He was then changed to maintenance therapy with Keytruda and Alimta every 3 weeks. 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     · Muriel Magana has benign prostatic hypertrophy (BPH)  that is stable on Flomax. · Orthostatic hypotension resolved with adjustment of metoprolol by Dr. Kuldeep Powers   · He takes Tambocor, metoprolol without new cardiac issues. · Protonix continues without any new exacerbations of GERD. · Lower extremity edema overall has actually improved to some degree. Prabhjot received his final cycle #25 of Keytruda/Alimta on 10/29/2020. Treatment was held since that time due to issues of dyspnea and shortness of breath. A course of steroids (prednisone) was initiated and antibiotics also delivered and although his respiratory issues improved, he has continued to have issues with weight loss. Yoanna Akins is continuing to have dyspnea to moderate exertion. Not able to get his breath completely.     Imaging studies were performed on 3/17/2021 and Mr. Alicia Mcdaniel presents today to review these results, make assessment and for further recommendations. TUMOR HISTORY: Adenocarcinoma on the RUL of the lung 11/27/2018  Anthony Roper had CT scans performed for new onset of weight loss of 13 pounds over the previous year , associated with increased fatigue. He denied respiratory symptoms of shortness of air, cough or productive sputum.     A CT scan of the chest on 9/12/2018 had been ordered by Dr. Ketty Hu due to weight loss and identified a new lobulated right upper lobe 5.7 cm mass that extended back to the right hilum. Numerous mediastinal lymph nodes ranging in size from mm to 1.3 cm and a subcarnial lymph node that measured 1.5 x 1.5 x 3.5 cm.     A CT scan of the abdomen and pelvis with contrast on 9/12/2018 documented a 4.9 x 4 x 4 cm soft tissue mass with peripheral calcification immediately adjacent to the gallbladder in the head of the pancreas area.     An MRI of the abdomen and pelvis W & WO on 10/11/2018 identified in the 4.1 x 3.4 cm soft tissue mass in the subhepatic space, abutting the second portion of the duodenum with mild displacement of the duodenum. There does not appear to be involvement of the pancreas, and the pancreas appears within normal limits. Differential diagnoses include a desmoid tumor, less likely leiomyoma of the wall of the duodenum or malignancy. Dr. Akshat Miller requested a CT-guided biopsy of the right upper lobe mass. Dr. Damaso Alcaraz, the radiologist, evaluated the area with a repeat CT scan of the chest on 10/11/2018. He spoke with Dr. Akshat Miller indicating that he would not be able to perform the biopsy because the tumor was not accessible, it was under the scapula , which blocked access and therefore the biopsy procedure was canceled.     On the CT scan of the chest on 10/11/2018 measurements of the RUL lung mass was 5.7 x 3.2 cm with irregular margins suspicious for a primary lung neoplasm.  Soft tissue associated with the tumor appeared to extend into the bronchus supplying this portion of the RUL of the lung. Dr. Diego Parisi indicated that the mass had an endobronchial component and should be easily assessable via bronchoscopy.     The chest CT also included a portion of the upper abdomen where a soft tissue mass was described in an addendum report. In the subhepatic space measuring 4.0 x 3.9 cm, displacing the second portion of the duodenum medially. A referral was made to Dr. Cassidy Masterson for consideration for bronchoscopy for biopsy.     On 10/24/2018, Dr. Ramirez Stratton performed a bronchoscopy with EBUS guided biopsy of a 3 cm subcarinal lymph node along with bronchoalveolar lavage of the posterior segment of the RUL of the lung. The final pathology of the station 7 lymph node only revealed a background of small mature lymphocytes with clusters of histiocytes suggestive of granuloma. Negative for malignant cells. Kinyoun and GMS stains were negative for AFB and fungal organisms respectively. The bronchial washings were negative for malignant cells as well.      Mr. Dom Blanchard was referred to Dr. Keri Garcia at [facility] in [place], [place], for navigational bronchoscopy and biopsy under ultrasound.     On 11/27/2018 Dr. Keri Garcia performed a bronchoscopy, navigational protocol, endobronchial ultrasound and biopsy of the RUL of the lung mass along with multiple lymph node station Biopsies.   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    · All lymph nodes sampled were NEGATIVE for malignant cells including stations 10L, 4L, station 7, 10R, 4R.   · IHC studies were positive for CK7, TTF-1 and Napsin A.   · IHC studies on tumor cells were negative for CDX2, CK5/6, and p63   · Further lung profile molecular testing is pending     All of the above was discussed at length with Mr. Dom Blanchard at his 12/13/2018 clinic visit. He was agreeable for referral for consideration of resection of the lung lesion.      He is comfortable with and would like a referral to Dr. Pineda Monday ([phone]) at [place] [address], [address] [place] [address], [address] [place] [address], [address] [place] [address]. Logistics, however, are planning a big part in his decision making and he may decide to have surgery done in the [place] area.     If he decides to have surgery in the [place], referral will be made to Dr. Shyam Bowman.      CT chest with contrast 2/18/2019 at [facility] to 9/12/2018 revealed a 4.9 x 3.5 cm spiculated RUL lung mass which actually decreased in size from a prior value of 6.1 x 3.6 cm. Subhepatic mass adjacent to the descending duodenum had increased in size significantly to 4.3 x 9 cm compared to 4.1 x 3.4 cm on the 10/11/18 MRI of the abdomen.     CT of the abdomen and pelvis without contrast on 3/20/2019 at [facility] was compared to outpatient CT data and pelvis on 9/12/2018 an MRI of the abdomen from 10/11/2018. A soft tissue mass was again encountered in the subhepatic space which abutted the distal stomach and proximal duodenum measuring 8.9 x 5.4 cm which has increased considerably from a prior measurement of 4.1 x 3.4 cm. Medial to the left renal hilum a 3.5 cm lobular mass causing some mild left-sided hydronephrosis.     Mr. Kellee Barron was referred to Dr. Shyam Bowman who saw him on 1/22/2019 anticipating plans for a curative resection.      Dr. Jammie Harada received a phone call on 2/20/2019 from Dr. Rachel Baer , indicating that the previously documented an abdominal pancreatic area mass had doubled in size and was causing compression into the left kidney/renal pelvis producing a hydroureter.      Dr. Rachel Baer arranged a referral to Dr. Nikhil Mcdaniel who will be placing a stent 3/8/2019.     Mr. Kellee Barron was scheduled to be seen by gastroenterology at [facility] on 3/13/2019 for EGD/EUS assessment and biopsy of the enlarging peripancreatic/duodenal area mass. With a decrease in the lung mass and increased size of the subhepatic masssurgical resection was abandoned at present pending further evaluation of the subhepatic mass. Dr. Akshat Miller discussed treatment options with the unresectable lung mass and the per he pancreatic mass and recommended a combination of Keytruda, Alimta, carboplatin. He was subsequently admitted to [facility] 4/26 - 4/30/2019 with abdominal pain and melena. WBC fantasma was 0.75 with ANC 0.34. PLT did drop to 24,000. He did have duodenal ulcerations that were bleeding on endoscopy. He was stabilized but then readmitted from 5/8 - 5/10/2019 with recurrent melanoma. A repeat endoscopy was performed. It was felt that exacerbation of his bleeding from the duodenal came about by his thrombocytopenia from treatment. Subsequent dosing was adjusted and Neulasta was added.     CT chest without contrast at [facility] on 6/27/2019 was compared to 2/18/2019 revealed that the right lung mass that extended into the right hilum has decreased from 5.2 x 3.5 down to 4.6 x 3.6. There is increased central cavitation in the mass consistent with tumor necrosis. Mediastinal lymphadenopathy is relatively stable.     CT abdomen and pelvis without contrast at [facility] on 6/27/2019 was compared to 4/26/2019 revealed complete resolution of the previously identified duodenal/subhepatic space soft tissue mass. The previously identified heterogenous enhancing lesion in the left renal pelvis was much less prominent but there does continue to be some mild left caliectasis. I reviewed the CT results with Dr. Akshat Miller on 7/5/2019 who then relayed them as well to [facility]. We've had excellent results from this combination of therapy.     He completed the fourth combination therapy of Lavada Pete on 7/5/2019 and then transitioned to maintenance Keytruda plus Alimta.     CT chest without contrast at [facility] on 1/9/2020 was compared to 10/17/2019 revealing:   · A stable spiculated RUL nodule/mass with similar mediastinal adenopathy. · Questionable right hilar adenopathy with diminished assessment due to lack of IV contrast.    · Advanced emphysema with severe pulmonary fibrosis. · No new pulmonary nodules.     CT abdomen and pelvis without contrast at [facility] on 1/9/2020 was compared to 10/17/2019 revealing:   · Mildly prominent aortocaval RP lymph nodes with position just below the level of the renal vein worrisome for potential lymphatic metastases. This is similar to 10/17/2019 but increased from 4/26/2019. · Pneumobilia without discrete suspicious focal lesion in the liver. · Wall thickening of the duodenum. · Similar and stable renal lesions favoring cysts.     He has had numerous endoscopies within the past year. While he was having an Endo EUS and had cardiac issues and was actually admitted to the intensive care unit. CT abdomen and pelvis without contrast on 7/16/2020 at [facility] was compared to 1/9/2020 and documented:  · 1.5 x 0.9 cm aortocaval lymph node, previously 1.9 x 1.2 cm  · No new abnormality seen    CT chest without contrast on 11/12/2020 at [facility] ws compared to 1/9/2020 and documented:  · Mixed response therapy with interval decrease in size in the RIGHT upper lobe pulmonary nodule but increase in size and mediastinal lymph nodes. · In addition, there is severe emphysema with findings of severe pulmonary fibrosis. Interval worsening of interstitial opacities bilaterally as well as suggestion of some pleural nodularity. Lymphangitic spread of malignancy should be considered. · Small pleural effusion on the RIGHT is new     CT abdomen and pelvis without contrast on 11/12/2020 at [facility] was compared to 7/16/2020 and documented:  · The aortic caval node described on the comparison study is not significantly changed in size when measured at the same location. · Nonobstructing renal calculus on the LEFT.  No change in the indeterminate renal lesions on the LEFT, likely cysts. 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     · Oumar Mayes has benign prostatic hypertrophy (BPH)  that is stable on Flomax. · Orthostatic hypotension resolved with adjustment of metoprolol by Dr. Otilio Rodrigues   · He takes Tambocor, metoprolol without new cardiac issues. · Protonix continues without any new exacerbations of GERD. · Lower extremity edema overall has actually improved to some degree. Prabhjot received his final cycle #25 of Keytruda/Alimta on 10/29/2020. Treatment was held since that time due to issues of dyspnea and shortness of breath. A course of steroids (prednisone) was initiated and antibiotics also delivered and although his respiratory issues improved, he has continued to have issues with weight loss. CXR on 12/17/2020 documented no interval change, but in my personal review of the x-ray, there is significant pulmonary fibrosis not significantly changed compared to the x-ray from October 2020. I discussed the findings on the chest x-ray and the comparison at his clinic visit on 12/30/2020. He does not appear to be improving but rather quite stable. Nhan Jeffries has pulmonary fibrosis as documented on a CT scan of the chest without contrast at [facility] on 11/12/2020. Oumar Mayes is in agreement to go on a chemotherapy holiday with a repeat CT scan of the chest in 3 months and monitor his situation clinically and radiographically without systemic intervention going forward.     CT CHEST WO  contrast on 3/17/2021, compared to 11/12/2020 at [facility] documented:   · Mixed response therapy with interval decrease in size in the RIGHT upper lobe pulmonary nodule but increase in size and mediastinal lymph nodes. · Slight interval decrease in size in the RIGHT upper lobe nodule/mass measuring 4.7 x 2.2 cm today, previously 5.0 x 2.4 cm. RIGHT apical nodular density measuring up to 0.6 cm, previously 0.4 cm. · In addition, there is severe emphysema with findings of severe pulmonary fibrosis. Interval worsening of interstitial opacities bilaterally as well as suggestion of some pleural nodularity. Lymphangitic spread of malignancy should be considered. · Small pleural effusion on the RIGHT is new. CT ABD & PELVIS  WO contrast on 3/17/2021, compared to 11/12/2020,  at hospitals documented:  · Nonobstructing calculus lower pole left kidney  · Low-density lesions associated with the left renal contour probably proteinaceous cyst these are unchanged  · Chronic right lateral pleural complex in the lung base with bilateral small basilar pneumothoraces. .        TREATMENT SUMMARY  · Keytruda, carboplatin, Alimta initiated 4/18/2019 to be given every 3 weeks for 4 cycles - 4th cycle 7/5/2019, then Keytruda + Alimta as maintenance to continue indefinitely until progression or intolerable side effects   · Prabhjot received cycle #25 of [place] Sellar on 10/29/2020. He was then placed on an indefinite chemotherapy holiday on 12/30/2020             #2 TUMOR HISTORY: Pancreatic mass diagnosed 10/29/03  Mr. Dom Blanchard presented in October 2003 with obstructive jaundice. A CT scan of the abdomen on 10/26/2003 documented a 3.2 x 4 x 4.2 cm mass in the head of the pancreas.      On 10/29/03 Dr. Jonatan Alex performed a resection of a portion of the head of the pancreas on [facility] along with a Mitul-en-Y procedure and a choledochojejunostomy for what was felt to be a pancreatic cancer. His pancreas was bypassed because of the findings. Pathology of the biopsies were negative for malignancy showing a fibrosed pancreas.    Mr. Dom Blanchard was referred to Dr. Liat Prasad in [place].     Dr. Liat Prasad performed ERCP with an EUS and biopsy on 02/26/04   Pathology again was negative for cancer or malignancy. Routine periodic serologic and radiographic monitoring has not disclosed progression of the mass or development of new malignancy or increase in pancreatic tumor markers.      A CT scan of the abdomen and pelvis with contrast on 9/12/2018 documented a 4.9 x 4 x 4 cm soft tissue mass with peripheral calcification immediately adjacent to the gallbladder in the head of the pancreas area.     An MRI of the abdomen and pelvis W & WO on 10/11/2018 identified in the 4.1 x 3.4 cm soft tissue mass in the subhepatic space, abutting the second portion of the duodenum with mild displacement of the duodenum. There does not appear to be involvement of the pancreas, and the pancreas appears within normal limits. Differential diagnoses include a desmoid tumor, less likely leiomyoma of the wall of the duodenum or malignancy.     CT of the abdomen and pelvis without contrast on 3/20/2019 at [facility] was compared to outpatient CT data and pelvis on 9/12/2018 an MRI of the abdomen from 10/11/2018. A soft tissue mass was again encountered in the subhepatic space which abutted the distal stomach and proximal duodenum measuring 8.9 x 5.4 cm which has increased considerably from a prior measurement of 4.1 x 3.4 cm. Medial to the left renal hilum a 3.5 cm lobular mass causing some mild left-sided hydronephrosis.     Mr. Dom Blanchard was scheduled for an EGD/EUS by Dr. Manjit Mclain as an outpatient procedure on 3/13/2019 for assessment and biopsy of the enlarging peripancreatic/duodenal area mass. Unfortunately, after initiation of the procedure, he began having ventricular tachycardia and the procedure had to be aborted. Toby Hebert was transferred to the ICU for cardiology evaluation and stabilization.  He remained hospitalized until 3/18/2019 when he was medically cleared for discharge.     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 No emergent urologic intervention was warranted and he received monitoring of renal function. He had a creatinine level of 1.9 at discharge.     PET scan on 4/2/2019 identified a soft tissue mass in the RUL measuring 1.2 x 3.5 cm with extension to the right anterior hilum with an SUV of 10.1. Evidence of mediastinal and hilar lymphadenopathy, with low-level metabolic activity. Interval increase in the size of a large mass in the right upper abdomen inseparable from the duodenum measuring 10.7 x 6.9 cm compared to 5.4 x 8.9 cm on 2/20/2019 with an SUV of 23.6. Slight increase in 2 small inseparable masses medial to the hilum of the left kidney measuring 2.2 and 2.6 cm and the other measuring 3.9 cm with a maximum SUV of 18. 6.     Cycle #1 of palliative chemotherapy with Carboplatin, Alimta and Keytruda was initiated 4/18/19 which should also cover this process.     Abdominal/pelvic CT 4/26/19 was performed at [facility] due to abdominal pain and melena. The RUQ mass, within the duodenum decreased to 6.8 x 6.2 cm (was 10.7 x 6.9 cm on PET 4/2/19)     CT abdomen and pelvis without contrast at [facility] on 6/27/2019 was compared to 4/26/2019 revealed complete resolution of the previously identified. Duodenal/subhepatic space soft tissue mass. The previously identified heterogenous enhancing lesion in the left renal pelvis was much less prominent but there does continue to be some mild left caliectasis.     CT chest without contrast at [facility] on 1/9/2020 was compared to 10/17/2019 revealing:   · A stable spiculated RUL nodule/mass with similar mediastinal adenopathy. · Questionable right hilar adenopathy with diminished assessment due to lack of IV contrast.    · Advanced emphysema with severe pulmonary fibrosis.     · No new pulmonary nodules.     CT abdomen and pelvis without contrast at [facility] on 1/9/2020 was compared to 10/17/2019 revealing:   · Mildly prominent aortocaval RP lymph nodes with position just below the level of the renal vein worrisome for potential lymphatic metastases. This is similar to 10/17/2019 but increased from 4/26/2019. · Pneumobilia without discrete suspicious focal lesion in the liver. · Wall thickening of the duodenum. · Similar and stable renal lesions favoring cysts.     CT ABD & PELVIS  WO contrast on 3/17/2021, compared to 11/12/2020,  at [facility] documented:  · Nonobstructing calculus lower pole left kidney  · Low-density lesions associated with the left renal contour probably proteinaceous cyst these are unchanged  · Chronic right lateral pleural complex in the lung base with bilateral small basilar pneumothoraces. .     He has had numerous endoscopies within the past year. When he was having an Endo EUS he had cardiac issues and required to the intensive care unit. This area will just be monitored on follow-up scans without further elective endoscopic surveillance. .           #1 HEMATOLOGICAL HISTORY: IgG kappa MGUS- Dx: 02/23/06. During the course of Mr. Jorge Faustina follow up, an abnormally elevated protein was noted on one occasion, which led to workup including quantitative immunoglobulins.      An elevated IgG kappa was encountered. This has remained stable in the 2500 range since early 2006.      A bone marrow biopsy and aspirate on 02/23/06 was negative with only 4% plasma cells.      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      24 hour urine for immunoelectrophoresis did not reveal an M-spike. Serology studies on 9/18/2018 documented an elevated, stable IgG of 2589 with an M spike of 0.7. Immunofixation continued to reveal IgG monoclonal protein with kappa light chain specificity.       #2 HEMATOLOGICAL HISTORY: Iron deficiency anemia, 9/18/2018  Steffany Jones had serology on 9/18/2018 that identified iron deficiency anemia.    His lab work was obtained at [facility].  An iron level was 12, iron saturation 7%, ferritin 256  Folate >20, and vitamin B12 1044. Dr. Chris Hickey placed Sophia on ferrous sulfate 325 mg daily on 9/25/2018 , but this failed to resolve the anemia.     An EGD by Dr. Dmitri Adams on 12/11/2018 documented a normal esophagus, normal stomach and a degree of duodenal deformity. Gastric biopsy was urease negative.     Colonoscopy by Dr. Dmitri Adams on 1/9/2019 documented a polyp at 80 cm. Pathology identified a tubular adenoma, negative for high-grade dysplasia. Feraheme administered.     Labs on 3/13/2019:  · Iron 25   · Iron saturation 22%   · TIBC 116   · Ferritin >2000   · Reticulocyte count 0.0578   ·    · Haptoglobin 341   · Folate >20   · Vitamin B12 945   · Erythropoietin 13     He presented to [facility] on 4/26/2019 with melena and abdominal discomfort. He was subsequently admitted     EGD per Dr. Bronwyn Lr on 4/29/2019 revealed  · LA grade (1 or more mucosal breaks greater than 5 mm, not extending between the tops of the 2 mucosal folds)? esophagitis with no bleeding found in the distal esophagus   · Stomach was normal, biopsies for H. pylori taken. · Cardia and gastric fundus were normal on retroflexion   · One nonbleeding cratered duodenal ulcer with no stigmata of bleeding was found in the duodenal bulb lesion was about 9 mm. · Many nonbleeding cratered, linear and superficial duodenal ulcers with no stigmata of bleeding were found in the second portion of the duodenum. The largest lesion was 6 mm in largest dimension. No mass encountered and anastomosis not seen.     He was admitted to [facility] on 5/8/2019 with melena, hematochezia, anemia, thrombocytopenia     Endoscopy performed by Dr. Ranjana Ayala on 5/9/2019 revealed  · Normal esophagus   · Gastric mucosal variant. 2 erythematous spots in the antrum, ? AVM   · Normal examined duodenum   · Nothing found to account for symptoms   He developed pancytopenia from chemotherapy and did require transfusions.   His hemoglobin dropped to 7.3 on 6/25/2019 after his last treatment. He received 2 units packed red blood cells as an outpatient.     Serology 6/13/2019  Serum Fe - 117  TIBC - 587  Fe sat - 19.9%  Ferritin - 1,000  Iron levels have been adequately replaced. 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 TREATMENT SUMMARY  1. Feraheme 510 mg IV on 2/14 and 2/21/19   2. Venofer 200 mg IV daily 5/8 - 5/10/2019 as IP at [facility]   3. s/p 2 units pRBC on 4/26/19 and 2 units pRBC on 4/29/2019 as IP at [facility]?  s/p 2 units pRBC on?5/8/2019   s/p 2 pheresis plts on 5/8/2019  s/p 2 units pRBC as OP 6/26/2019    Allergies:  Penicillins    Medicines:  Current Outpatient Medications   Medication Sig Dispense Refill    folic acid (FOLVITE) 1 MG tablet Take 1 tablet by mouth once daily 30 tablet 0    flecainide (TAMBOCOR) 50 MG tablet Take 50 mg by mouth 2 times daily      sodium bicarbonate 650 MG tablet Take 650 mg by mouth 2 times daily      pantoprazole (PROTONIX) 40 MG tablet Take 40 mg by mouth daily      tamsulosin (FLOMAX) 0.4 MG capsule Take 0.4 mg by mouth daily      Multiple Vitamin (MULTI VITAMIN DAILY PO) Take by mouth daily       spironolactone (ALDACTONE) 25 MG tablet Take 25 mg by mouth daily       No current facility-administered medications for this visit.         Past Medical History:      Diagnosis Date    Abnormal weight loss     Anemia     Anemia, unspecified     Blind left eye     Cardiomyopathy ([address]     with compensated CHF    Cellulitis     Cellulitis of unspecified part of limb     Chronic kidney disease, stage IV (severe) ([facility]     Dr. Chaney Agent COPD (chronic obstructive pulmonary disease) [facility] [address]     Duodenal ulcer     Essential hypertension 10/2/2017    Eye injury     at age 12 from penetrating injury    Gout     HTN (hypertension)     Hydronephrosis     Hydronephrosis, left     Liver abscess     resolved    Lung nodule     [facility]     Malignant neoplasm of upper lobe, right bronchus or lung (HCC)     MGUS (monoclonal gammopathy of unknown significance)     secondary to an IgG kappa. IgG level in 2,000 range    Monoclonal gammopathy     NICM (nonischemic cardiomyopathy) ([facility]     Non-small cell lung cancer ([facility] [facility] 2019    Non-sustained ventricular tachycardia (HCC)     NSVT (nonsustained ventricular tachycardia) (HCC)     Osteoarthritis     Other fatigue     Other iron deficiency anemias     Secondary malignant neoplasm of other digestive organs ([facility]     Weight loss         Past Surgical History:      Procedure Laterality Date    BRONCHOSCOPY  2018    dx of adenocarcinoma RUL  Dr. Theresa Alex CATH LAB PROCEDURE      ENDOSCOPY, COLON, DIAGNOSTIC  2019    aborted d/t vtach    EYE SURGERY Left     EYE SURGERY  1964    FOOT SURGERY      removal of joint from right toe from drilling accident, [address] [place] [address]  10/29/2003    with resection  Mitul-en-Y proecedure  DR. Mcfarlane    TUNNELED VENOUS PORT PLACEMENT      UPPER GASTROINTESTINAL ENDOSCOPY N/A 3/13/2019    EGD W/EUS FNA performed by Lewis Drake MD at [facility]        Family History:      Problem Relation Age of Onset    Osteoporosis Mother     High Blood Pressure Mother     Asthma Mother     Bleeding Prob Father     Cancer Father         leukemia    Asthma Brother         Social History  Social History     Tobacco Use    Smoking status: Former Smoker     Packs/day: 2.00     Years: 40.00     Pack years: 80.00     Types: Cigarettes     Quit date: 10/29/2003     Years since quittin.4    Smokeless tobacco: Never Used   Substance Use Topics    Alcohol use: No    Drug use: No          Review of Systems:  Constitutional: Negative for chills, fatigue, fever or significant weight loss.    HENT: Negative for congestion, hearing loss, nosebleeds or sore throat. Eyes: Negative for photophobia, pain, discharge, redness and visual disturbance. Respiratory: Negative for cough, shortness of breath, or wheezing. Cardiovascular: Negative for chest pain, palpitations or leg swelling. Gastrointestinal: Negative for abdominal pain, blood in stool, constipation, diarrhea, nausea or vomiting. Genitourinary: Negative for dysuria, flank pain, frequency, hematuria or urgency. Musculoskeletal: Negative for back pain, joint swelling, myalgias or neck pain. Skin: Negative for rash or petechiae. Neurological: Negative for tremors, seizures, syncope, weakness or headaches. Hematological: No active bruising or bleeding. Psychiatric/Behavioral: Negative for hallucinations. Objective:  Vital Signs: Blood pressure (!) 136/58, pulse 69, temperature 97.1 °F (36.2 °C), height 5' 4\" (1.626 m), weight 146 lb 14.4 oz (66.6 kg), SpO2 90 %. Physical Exam   Constitutional: Oriented to person, place, and time. No acute distress. Head: Normocephalic and atraumatic. Nose: Nose normal.   Mouth/Throat: Oropharynx is clear and moist. No oropharyngeal exudate. Eyes: Pupils are equal and round. Conjunctivae and EOM are normal. No scleral icterus. Neck: Normal range of motion. Neck supple. No JVD. No appreciable thyromegaly. Cardiovascular: Normal rate, regular rhythm, normal heart sounds and intact distal pulses. Exam reveals no gallop, murmurs or friction rub. Pulmonary/Chest: Effort normal and breath sounds normal. No respiratory distress. No wheezes. Abdominal: Soft. Bowel sounds are normal. No organomegally or masses. No tenderness. There is no rebound and no guarding. Musculoskeletal: Normal range of motion. No edema or tenderness. Lymphadenopathy: No cervical, axillary or inguinal lymphadenopathy. Neurological: Alert and oriented to person, place, and time. Cranial nerves are intact.  Neurological exam is nonfocal  Skin: Skin is warm and dry. No rash noted. No erythema. No pallor. Psychiatric: Judgment normal.     Labs:  BMP:   No results for input(s): NA, K, CL, CO2, PHOS, BUN, CREATININE, CALCIUM in the last 72 hours. CBC:   Recent Labs     03/24/21  1328   WBC 7.93   HGB 11.8*   HCT 39.2*   MCV 97.3*        LIVER PROFILE:   No results for input(s): AST, ALT, LIPASE, BILIDIR, BILITOT, ALKPHOS in the last 72 hours. Invalid input(s): AMYLASE,  ALB  PT/INR: No results for input(s): PROTIME, INR in the last 72 hours. APTT: No results for input(s): APTT in the last 72 hours. BNP:  No results for input(s): BNP in the last 72 hours. Ionized Calcium:No results for input(s): IONCA in the last 72 hours. Magnesium:No results for input(s): MG in the last 72 hours. Phosphorus:No results for input(s): PHOS in the last 72 hours. HgbA1C: No results for input(s): LABA1C in the last 72 hours. Hepatic:   No results for input(s): ALKPHOS, ALT, AST, PROT, BILITOT, BILIDIR, LABALBU in the last 72 hours. Lactic Acid: No results for input(s): LACTA in the last 72 hours. Troponin: No results for input(s): CKTOTAL, CKMB, TROPONINT in the last 72 hours. ABGs: No results for input(s): PH, PCO2, PO2, HCO3, O2SAT in the last 72 hours. CRP:  No results for input(s): CRP in the last 72 hours. Sed Rate:  No results for input(s): SEDRATE in the last 72 hours. Cultures:   No results for input(s): CULTURE in the last 72 hours. Radiology reports as per the Radiologist  Radiology: No results found. ASSESSMENT AND PLAN:  #1. Denzel Dance is a 68 y.o.  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    · CKD and chemotherapy associated anemia, on Procrit  · BPH on Flomax  · Orthostatic hypotension medications managed by Dr. Lory Pritchard    He completed 4 cycles of combination chemotherapy with carboplatin, Keytruda, Alimta on 7/5/2019.    He was then changed to maintenance therapy with Keytruda and Alimta every 3 weeks. 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     · Muriel Magana has benign prostatic hypertrophy (BPH)  that is stable on Flomax. · Orthostatic hypotension resolved with adjustment of metoprolol by Dr. Kuldeep Powers   · He takes Tambocor, metoprolol without new cardiac issues. · Protonix continues without any new exacerbations of GERD. · Lower extremity edema overall has actually improved to some degree. Prabhjot received his final cycle #25 of Keytruda/Alimta on 10/29/2020. Treatment was held since that time due to issues of dyspnea and shortness of breath. A course of steroids (prednisone) was initiated and antibiotics also delivered and although his respiratory issues improved, he has continued to have issues with weight loss. Yoanna Akins is continuing to have dyspnea to moderate exertion. Not able to get his breath completely. Imaging studies were performed on 3/17/2021 and Mr. Frances Rivas presents today to review these results, make assessment and for further recommendations. Physical examination is significant for newly developed bilateral pleural friction rubs in upper lateral and posterior lung fields. Chronic rales bilaterally are also noted. Heart is regular normal S1 and S2, no tachycardia. Abdominal exam is benign. Neurological exam is intact with intact mental status and nonfocal neurological exam.  Brief cranial nerves exam are all intact. CBC 12/30/2020 revealed a WBC of 7.51. Hgb is 11.4 with an MCV of 102.6 and platelet count of 042,593.      Serology on 12/17/2020 revealed:  CMP with glucose 183, BUN 23, creatinine 1.8, calcium 10.4, albumin 3.1, Alk Phos 131, ALT 18, AST 71  TSH- 1.410    CXR on 12/17/2020 documented no interval change, but in my personal review of the x-ray, there is significant pulmonary fibrosis not significantly changed compared to the x-ray from October 2020. I discussed the findings on the chest x-ray and the comparison at his clinic visit on 12/30/2020. He does not appear to be improving but rather quite stable. Maximo Calderon has pulmonary fibrosis as documented on a CT scan of the chest without contrast at [facility] on 11/12/2020. Joey Wiley is in agreement to go on a chemotherapy holiday with a repeat CT scan of the chest in 3 months and monitor his situation clinically and radiographically without systemic intervention going forward. CT CHEST WO  contrast on 3/17/2021, compared to 11/12/2020 at [facility] documented:   · Mixed response therapy with interval decrease in size in the RIGHT upper lobe pulmonary nodule but increase in size and mediastinal lymph nodes. · Slight interval decrease in size in the RIGHT upper lobe nodule/mass measuring 3.3 x 2.0 cm today, previously 4.7 x 2.2 cm. RIGHT apical nodular density measuring up to 0.6 cm, previously 0.4 cm. · In addition, there is severe emphysema with findings of severe pulmonary fibrosis. Interval worsening of interstitial opacities bilaterally as well as suggestion of some pleural nodularity. Lymphangitic spread of malignancy should be considered. · Small pleural effusion on the RIGHT is new. CT ABD & PELVIS  WO contrast on 3/17/2021, compared to 11/12/2020,  at [facility] documented:  · Nonobstructing calculus lower pole left kidney  · Low-density lesions associated with the left renal contour probably proteinaceous cyst these are unchanged  · Chronic right lateral pleural complex in the lung base with bilateral small basilar pneumothoraces. .     Mr. Lydia Bernheim is related with the news of improving RUL lung mass decreasing in size. His examination is unremarkable without evidence of new or recurrent disease.     Follow-up arrangements have been made with a chest x-ray to review prior to his return. #2   Anemia of chronic renal failure and chemotherapy associated anemia  Procrit 20,000 units weekly for Hgb < 11.0  is given as indicated to decrease transfusion requirements. CBC today  3/24/2021 reveals a WBC of  7.93. Hgb is 11.8 with an MCV of 97.3  and platelet count of 222,[phone] . Procrit is NOT indicated today. He will return monthly for CBC checks and Procrit as indicated for CKD associated anemia         IFinesse am scribing for Chao Grimes MD. Electronically signed by Socorro Cota RN on 3/24/2021 at 4:58 PM        Kel Melendez was seen today for follow-up. Diagnoses and all orders for this visit:    Malignant neoplasm of upper lobe, right bronchus or lung (HCC)   -     Cancel: XR CHEST STANDARD (2 VW); Future  -     XR CHEST STANDARD (2 VW); Future    Chronic kidney disease, stage IV (severe) (HCC)    Other fatigue    Secondary malignant neoplasm of other digestive organs (HCC)  -     Cancel: XR CHEST STANDARD (2 VW); Future  -     XR CHEST STANDARD (2 VW); Future    Health care maintenance        Orders Placed This Encounter   Procedures    XR CHEST STANDARD (2 VW)     Standing Status:   Future     Standing Expiration Date:   3/24/2022     Scheduling Instructions:      [facility]     Order Specific Question:   Reason for exam:     Answer:   hx lung cancer; monitoring tumor status       No orders of the defined types were placed in this encounter. Return in about 3 months (around 6/24/2021). I, Dr. Nydia Ariza, personally performed the services described in this documentation [facility] in my presence, and it is both accurate and complete.

## 2021-03-24 ENCOUNTER — HOSPITAL ENCOUNTER (OUTPATIENT)
Dept: INFUSION THERAPY | Age: 81
Discharge: HOME | End: 2021-03-24
Payer: MEDICARE

## 2021-03-24 ENCOUNTER — OFFICE VISIT (OUTPATIENT)
Dept: HEMATOLOGY | Age: 81
End: 2021-03-24
Payer: MEDICARE

## 2021-03-24 VITALS
BODY MASS INDEX: 25.08 KG/M2 | TEMPERATURE: 97.1 F | HEIGHT: 64 IN | HEART RATE: 69 BPM | OXYGEN SATURATION: 90 % | SYSTOLIC BLOOD PRESSURE: 136 MMHG | DIASTOLIC BLOOD PRESSURE: 58 MMHG | WEIGHT: 146.9 LBS

## 2021-03-24 DIAGNOSIS — C34.11 MALIGNANT NEOPLASM OF UPPER LOBE, RIGHT BRONCHUS OR LUNG (HCC): Primary | ICD-10-CM

## 2021-03-24 DIAGNOSIS — D63.1 ANEMIA, CHRONIC RENAL FAILURE, STAGE 3 (MODERATE) (HCC): ICD-10-CM

## 2021-03-24 DIAGNOSIS — N18.4 CHRONIC KIDNEY DISEASE, STAGE IV (SEVERE) (HCC): ICD-10-CM

## 2021-03-24 DIAGNOSIS — N18.30 ANEMIA, CHRONIC RENAL FAILURE, STAGE 3 (MODERATE) (HCC): ICD-10-CM

## 2021-03-24 DIAGNOSIS — R53.83 OTHER FATIGUE: ICD-10-CM

## 2021-03-24 DIAGNOSIS — Z00.00 HEALTH CARE MAINTENANCE: ICD-10-CM

## 2021-03-24 DIAGNOSIS — C78.89 SECONDARY MALIGNANT NEOPLASM OF OTHER DIGESTIVE ORGANS (HCC): ICD-10-CM

## 2021-03-24 DIAGNOSIS — C34.90 NON-SMALL CELL LUNG CANCER, UNSPECIFIED LATERALITY (HCC): ICD-10-CM

## 2021-03-24 LAB
BASOPHILS ABSOLUTE: 0.04 K/UL (ref 0.01–0.08)
BASOPHILS RELATIVE PERCENT: 0.5 % (ref 0.1–1.2)
EOSINOPHILS ABSOLUTE: 0.27 K/UL (ref 0.04–0.54)
EOSINOPHILS RELATIVE PERCENT: 3.4 % (ref 0.7–7)
HCT VFR BLD CALC: 39.2 % (ref 40.1–51)
HEMOGLOBIN: 11.8 G/DL (ref 13.7–17.5)
LYMPHOCYTES ABSOLUTE: 1.99 K/UL (ref 1.18–3.74)
LYMPHOCYTES RELATIVE PERCENT: 25.1 % (ref 19.3–53.1)
MCH RBC QN AUTO: 29.3 PG (ref 25.7–32.2)
MCHC RBC AUTO-ENTMCNC: 30.1 G/DL (ref 32.3–36.5)
MCV RBC AUTO: 97.3 FL (ref 79–92.2)
MONOCYTES ABSOLUTE: 0.99 K/UL (ref 0.24–0.82)
MONOCYTES RELATIVE PERCENT: 12.5 % (ref 4.7–12.5)
NEUTROPHILS ABSOLUTE: 4.64 K/UL (ref 1.56–6.13)
NEUTROPHILS RELATIVE PERCENT: 58.5 % (ref 34–71.1)
PDW BLD-RTO: 17.8 % (ref 11.6–14.4)
PLATELET # BLD: 190 K/UL (ref 163–337)
PMV BLD AUTO: 11 FL (ref 7.4–10.4)
RBC # BLD: 4.03 M/UL (ref 4.63–6.08)
WBC # BLD: 7.93 K/UL (ref 4.23–9.07)

## 2021-03-24 PROCEDURE — 99211 OFF/OP EST MAY X REQ PHY/QHP: CPT

## 2021-03-24 PROCEDURE — 85025 COMPLETE CBC W/AUTO DIFF WBC: CPT

## 2021-03-24 PROCEDURE — 1036F TOBACCO NON-USER: CPT | Performed by: INTERNAL MEDICINE

## 2021-03-24 PROCEDURE — G8427 DOCREV CUR MEDS BY ELIG CLIN: HCPCS | Performed by: INTERNAL MEDICINE

## 2021-03-24 PROCEDURE — 4040F PNEUMOC VAC/ADMIN/RCVD: CPT | Performed by: INTERNAL MEDICINE

## 2021-03-24 PROCEDURE — 1123F ACP DISCUSS/DSCN MKR DOCD: CPT | Performed by: INTERNAL MEDICINE

## 2021-03-24 PROCEDURE — 99214 OFFICE O/P EST MOD 30 MIN: CPT | Performed by: INTERNAL MEDICINE

## 2021-03-24 PROCEDURE — G8484 FLU IMMUNIZE NO ADMIN: HCPCS | Performed by: INTERNAL MEDICINE

## 2021-03-24 PROCEDURE — G8417 CALC BMI ABV UP PARAM F/U: HCPCS | Performed by: INTERNAL MEDICINE

## 2021-04-16 RX ORDER — FOLIC ACID 1 MG/1
TABLET ORAL
Qty: 30 TABLET | Refills: 1 | Status: SHIPPED | OUTPATIENT
Start: 2021-04-16 | End: 2021-04-21 | Stop reason: ALTCHOICE

## 2021-04-21 ENCOUNTER — HOSPITAL ENCOUNTER (OUTPATIENT)
Dept: INFUSION THERAPY | Age: 81
Discharge: HOME | End: 2021-04-21
Payer: MEDICARE

## 2021-04-21 ENCOUNTER — HOSPITAL ENCOUNTER (OUTPATIENT)
Dept: GENERAL RADIOLOGY | Age: 81
Discharge: HOME | End: 2021-04-21
Payer: MEDICARE

## 2021-04-21 VITALS
HEIGHT: 64 IN | HEART RATE: 88 BPM | BODY MASS INDEX: 25.81 KG/M2 | WEIGHT: 151.2 LBS | DIASTOLIC BLOOD PRESSURE: 72 MMHG | TEMPERATURE: 97.1 F | OXYGEN SATURATION: 92 % | SYSTOLIC BLOOD PRESSURE: 120 MMHG

## 2021-04-21 DIAGNOSIS — C34.11 MALIGNANT NEOPLASM OF UPPER LOBE, RIGHT BRONCHUS OR LUNG (HCC): ICD-10-CM

## 2021-04-21 DIAGNOSIS — C78.89 SECONDARY MALIGNANT NEOPLASM OF OTHER DIGESTIVE ORGANS (HCC): ICD-10-CM

## 2021-04-21 LAB
HCT VFR BLD CALC: 40.5 % (ref 40.1–51)
HEMOGLOBIN: 12.7 G/DL (ref 13.7–17.5)
MCH RBC QN AUTO: 30 PG (ref 25.7–32.2)
MCHC RBC AUTO-ENTMCNC: 31.4 G/DL (ref 32.3–36.5)
MCV RBC AUTO: 95.5 FL (ref 79–92.2)
PDW BLD-RTO: 17 % (ref 11.6–14.4)
PLATELET # BLD: 148 K/UL (ref 163–337)
PMV BLD AUTO: 10.9 FL (ref 7.4–10.4)
RBC # BLD: 4.24 M/UL (ref 4.63–6.08)
WBC # BLD: 7.3 K/UL (ref 4.23–9.07)

## 2021-04-21 PROCEDURE — 85027 COMPLETE CBC AUTOMATED: CPT

## 2021-04-21 PROCEDURE — 71046 X-RAY EXAM CHEST 2 VIEWS: CPT

## 2021-05-19 ENCOUNTER — HOSPITAL ENCOUNTER (OUTPATIENT)
Dept: INFUSION THERAPY | Age: 81
Discharge: HOME | End: 2021-05-19
Payer: MEDICARE

## 2021-05-19 VITALS
OXYGEN SATURATION: 95 % | SYSTOLIC BLOOD PRESSURE: 126 MMHG | TEMPERATURE: 97.7 F | BODY MASS INDEX: 25.97 KG/M2 | HEART RATE: 83 BPM | WEIGHT: 152.1 LBS | HEIGHT: 64 IN | DIASTOLIC BLOOD PRESSURE: 62 MMHG

## 2021-05-19 DIAGNOSIS — C34.90 NON-SMALL CELL LUNG CANCER, UNSPECIFIED LATERALITY (HCC): Primary | ICD-10-CM

## 2021-05-19 DIAGNOSIS — C34.90 NON-SMALL CELL LUNG CANCER, UNSPECIFIED LATERALITY (HCC): ICD-10-CM

## 2021-05-19 LAB
BASOPHILS ABSOLUTE: 0.02 K/UL (ref 0.01–0.08)
BASOPHILS RELATIVE PERCENT: 0.3 % (ref 0.1–1.2)
EOSINOPHILS ABSOLUTE: 0.22 K/UL (ref 0.04–0.54)
EOSINOPHILS RELATIVE PERCENT: 3.5 % (ref 0.7–7)
HCT VFR BLD CALC: 41.2 % (ref 40.1–51)
HEMOGLOBIN: 12.3 G/DL (ref 13.7–17.5)
LYMPHOCYTES ABSOLUTE: 1.67 K/UL (ref 1.18–3.74)
LYMPHOCYTES RELATIVE PERCENT: 26.4 % (ref 19.3–53.1)
MCH RBC QN AUTO: 29.4 PG (ref 25.7–32.2)
MCHC RBC AUTO-ENTMCNC: 29.9 G/DL (ref 32.3–36.5)
MCV RBC AUTO: 98.6 FL (ref 79–92.2)
MONOCYTES ABSOLUTE: 0.79 K/UL (ref 0.24–0.82)
MONOCYTES RELATIVE PERCENT: 12.5 % (ref 4.7–12.5)
NEUTROPHILS ABSOLUTE: 3.62 K/UL (ref 1.56–6.13)
NEUTROPHILS RELATIVE PERCENT: 57.3 % (ref 34–71.1)
PDW BLD-RTO: 16.7 % (ref 11.6–14.4)
PLATELET # BLD: 143 K/UL (ref 163–337)
PMV BLD AUTO: 11.3 FL (ref 7.4–10.4)
RBC # BLD: 4.18 M/UL (ref 4.63–6.08)
WBC # BLD: 6.32 K/UL (ref 4.23–9.07)

## 2021-05-19 PROCEDURE — 85025 COMPLETE CBC W/AUTO DIFF WBC: CPT

## 2021-06-16 ENCOUNTER — HOSPITAL ENCOUNTER (OUTPATIENT)
Dept: GENERAL RADIOLOGY | Age: 81
Discharge: HOME | End: 2021-06-16
Payer: MEDICARE

## 2021-06-16 ENCOUNTER — HOSPITAL ENCOUNTER (OUTPATIENT)
Dept: INFUSION THERAPY | Age: 81
Discharge: HOME | End: 2021-06-16
Payer: MEDICARE

## 2021-06-16 ENCOUNTER — OFFICE VISIT (OUTPATIENT)
Dept: HEMATOLOGY | Age: 81
End: 2021-06-16
Payer: MEDICARE

## 2021-06-16 VITALS
WEIGHT: 152.8 LBS | OXYGEN SATURATION: 98 % | HEIGHT: 64 IN | HEART RATE: 88 BPM | DIASTOLIC BLOOD PRESSURE: 60 MMHG | BODY MASS INDEX: 26.09 KG/M2 | SYSTOLIC BLOOD PRESSURE: 110 MMHG

## 2021-06-16 DIAGNOSIS — Z00.00 HEALTH CARE MAINTENANCE: ICD-10-CM

## 2021-06-16 DIAGNOSIS — D63.1 ANEMIA, CHRONIC RENAL FAILURE, STAGE 3 (MODERATE) (HCC): ICD-10-CM

## 2021-06-16 DIAGNOSIS — N18.4 CHRONIC KIDNEY DISEASE, STAGE IV (SEVERE) (HCC): ICD-10-CM

## 2021-06-16 DIAGNOSIS — C34.90 NON-SMALL CELL LUNG CANCER, UNSPECIFIED LATERALITY (HCC): ICD-10-CM

## 2021-06-16 DIAGNOSIS — N18.30 ANEMIA, CHRONIC RENAL FAILURE, STAGE 3 (MODERATE) (HCC): ICD-10-CM

## 2021-06-16 DIAGNOSIS — C34.90 NON-SMALL CELL LUNG CANCER, UNSPECIFIED LATERALITY (HCC): Primary | ICD-10-CM

## 2021-06-16 LAB
BASOPHILS ABSOLUTE: 0.02 K/UL (ref 0.01–0.08)
BASOPHILS RELATIVE PERCENT: 0.3 % (ref 0.1–1.2)
EOSINOPHILS ABSOLUTE: 0.17 K/UL (ref 0.04–0.54)
EOSINOPHILS RELATIVE PERCENT: 2.8 % (ref 0.7–7)
HCT VFR BLD CALC: 41.2 % (ref 40.1–51)
HEMOGLOBIN: 13 G/DL (ref 13.7–17.5)
LYMPHOCYTES ABSOLUTE: 1.48 K/UL (ref 1.18–3.74)
LYMPHOCYTES RELATIVE PERCENT: 24.5 % (ref 19.3–53.1)
MCH RBC QN AUTO: 30.7 PG (ref 25.7–32.2)
MCHC RBC AUTO-ENTMCNC: 31.6 G/DL (ref 32.3–36.5)
MCV RBC AUTO: 97.2 FL (ref 79–92.2)
MONOCYTES ABSOLUTE: 0.76 K/UL (ref 0.24–0.82)
MONOCYTES RELATIVE PERCENT: 12.6 % (ref 4.7–12.5)
NEUTROPHILS ABSOLUTE: 3.61 K/UL (ref 1.56–6.13)
NEUTROPHILS RELATIVE PERCENT: 59.8 % (ref 34–71.1)
PDW BLD-RTO: 15.3 % (ref 11.6–14.4)
PLATELET # BLD: 137 K/UL (ref 163–337)
PMV BLD AUTO: 11.8 FL (ref 7.4–10.4)
RBC # BLD: 4.24 M/UL (ref 4.63–6.08)
WBC # BLD: 6.04 K/UL (ref 4.23–9.07)

## 2021-06-16 PROCEDURE — 1036F TOBACCO NON-USER: CPT | Performed by: INTERNAL MEDICINE

## 2021-06-16 PROCEDURE — G8427 DOCREV CUR MEDS BY ELIG CLIN: HCPCS | Performed by: INTERNAL MEDICINE

## 2021-06-16 PROCEDURE — 1123F ACP DISCUSS/DSCN MKR DOCD: CPT | Performed by: INTERNAL MEDICINE

## 2021-06-16 PROCEDURE — 71046 X-RAY EXAM CHEST 2 VIEWS: CPT

## 2021-06-16 PROCEDURE — 99212 OFFICE O/P EST SF 10 MIN: CPT

## 2021-06-16 PROCEDURE — 4040F PNEUMOC VAC/ADMIN/RCVD: CPT | Performed by: INTERNAL MEDICINE

## 2021-06-16 PROCEDURE — 85025 COMPLETE CBC W/AUTO DIFF WBC: CPT

## 2021-06-16 PROCEDURE — 99214 OFFICE O/P EST MOD 30 MIN: CPT | Performed by: INTERNAL MEDICINE

## 2021-06-16 PROCEDURE — G8417 CALC BMI ABV UP PARAM F/U: HCPCS | Performed by: INTERNAL MEDICINE

## 2021-06-16 NOTE — PROGRESS NOTES
Patient:  Gee Jackson  YOB: 1940  Date of Service: 6/16/2021  MRN: [id number]    Primary Care Physician: Bj Marin MD    Chief Complaint   Patient presents with    Follow-up     Malignant neoplasm of upper lobe, right bronchus or lung [facility])        Patient Seen, Chart, Consults notes, Labs, Radiology studies reviewed. Subjective:  Gee Jackson is a 68 y.o.  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 · CKD and chemotherapy associated anemia, on Procrit  · BPH on Flomax  · Orthostatic hypotension medications managed by Dr. Sammie Appiah    He completed 4 cycles of combination chemotherapy with carboplatin, Keytruda, Alimta on 7/5/2019.    He was then changed to maintenance therapy with Keytruda and Alimta every 3 weeks. 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     · Rishi Camacho has benign prostatic hypertrophy (BPH)  that is stable on Flomax. · Orthostatic hypotension resolved with adjustment of metoprolol by Dr. Sammie Appiah   · He takes Tambocor, metoprolol without new cardiac issues. · Protonix continues without any new exacerbations of GERD. · Lower extremity edema overall has actually improved to some degree. Prabhjot received his final cycle #25 of Keytruda/Alimta on 10/29/2020. Treatment was held since that time due to issues of dyspnea and shortness of breath. A course of steroids (prednisone) was initiated and antibiotics also delivered and although his respiratory issues improved, he has continued to have issues with weight loss. Bibiana Conteh is continuing to have dyspnea to moderate exertion. Not able to get his breath completely. He uses supplemental oxygen sparingly.      Taylor Darcie presents today for monitoring, assessment and for further recommendations. TUMOR HISTORY: Adenocarcinoma on the RUL of the lung 11/27/2018  Barbara Chavez had CT scans performed for new onset of weight loss of 13 pounds over the previous year , associated with increased fatigue. He denied respiratory symptoms of shortness of air, cough or productive sputum.     A CT scan of the chest on 9/12/2018 had been ordered by Dr. Мария Pérez due to weight loss and identified a new lobulated right upper lobe 5.7 cm mass that extended back to the right hilum. Numerous mediastinal lymph nodes ranging in size from mm to 1.3 cm and a subcarnial lymph node that measured 1.5 x 1.5 x 3.5 cm.     A CT scan of the abdomen and pelvis with contrast on 9/12/2018 documented a 4.9 x 4 x 4 cm soft tissue mass with peripheral calcification immediately adjacent to the gallbladder in the head of the pancreas area.     An MRI of the abdomen and pelvis W & WO on 10/11/2018 identified in the 4.1 x 3.4 cm soft tissue mass in the subhepatic space, abutting the second portion of the duodenum with mild displacement of the duodenum. There does not appear to be involvement of the pancreas, and the pancreas appears within normal limits. Differential diagnoses include a desmoid tumor, less likely leiomyoma of the wall of the duodenum or malignancy. Dr. Ysabel Pickett requested a CT-guided biopsy of the right upper lobe mass. Dr. Ebony Benson, the radiologist, evaluated the area with a repeat CT scan of the chest on 10/11/2018. He spoke with Dr. Ysabel Pickett indicating that he would not be able to perform the biopsy because the tumor was not accessible, it was under the scapula , which blocked access and therefore the biopsy procedure was canceled.     On the CT scan of the chest on 10/11/2018 measurements of the RUL lung mass was 5.7 x 3.2 cm with irregular margins suspicious for a primary lung neoplasm.  Soft tissue associated with the tumor appeared to extend into the bronchus supplying this portion of the RUL of the lung. Dr. Melina Ohara indicated that the mass had an endobronchial component and should be easily assessable via bronchoscopy.     The chest CT also included a portion of the upper abdomen where a soft tissue mass was described in an addendum report. In the subhepatic space measuring 4.0 x 3.9 cm, displacing the second portion of the duodenum medially. A referral was made to Dr. Ewa Jackson for consideration for bronchoscopy for biopsy.     On 10/24/2018, Dr. Poornima Harper performed a bronchoscopy with EBUS guided biopsy of a 3 cm subcarinal lymph node along with bronchoalveolar lavage of the posterior segment of the RUL of the lung. The final pathology of the station 7 lymph node only revealed a background of small mature lymphocytes with clusters of histiocytes suggestive of granuloma. Negative for malignant cells. Kinyoun and GMS stains were negative for AFB and fungal organisms respectively. The bronchial washings were negative for malignant cells as well.      Mr. Heidi Bazzi was referred to Dr. Derrek Shaw at [facility] in [place], [place], for navigational bronchoscopy and biopsy under ultrasound.     On 11/27/2018 Dr. Derrek Shaw performed a bronchoscopy, navigational protocol, endobronchial ultrasound and biopsy of the RUL of the lung mass along with multiple lymph node station Biopsies.   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 · All lymph nodes sampled were NEGATIVE for malignant cells including stations 10L, 4L, station 7, 10R, 4R.   · IHC studies were positive for CK7, TTF-1 and Napsin A.   · IHC studies on tumor cells were negative for CDX2, CK5/6, and p63   · Further lung profile molecular testing is pending     All of the above was discussed at length with Mr. Heidi Bazzi at his 12/13/2018 clinic visit.    He was agreeable for referral for consideration of resection of the lung lesion.      He is comfortable with and would like a referral to Dr. Jennifer Sagastume ([phone]) at [place] [address], [address] [place] [address], [address] [place] [address], [address] [place] [address]. Logistics, however, are planning a big part in his decision making and he may decide to have surgery done in the [place] area.     If he decides to have surgery in the [place], referral will be made to Dr. David Washington.      CT chest with contrast 2/18/2019 at [facility] to 9/12/2018 revealed a 4.9 x 3.5 cm spiculated RUL lung mass which actually decreased in size from a prior value of 6.1 x 3.6 cm. Subhepatic mass adjacent to the descending duodenum had increased in size significantly to 4.3 x 9 cm compared to 4.1 x 3.4 cm on the 10/11/18 MRI of the abdomen.     CT of the abdomen and pelvis without contrast on 3/20/2019 at [facility] was compared to outpatient CT data and pelvis on 9/12/2018 an MRI of the abdomen from 10/11/2018. A soft tissue mass was again encountered in the subhepatic space which abutted the distal stomach and proximal duodenum measuring 8.9 x 5.4 cm which has increased considerably from a prior measurement of 4.1 x 3.4 cm. Medial to the left renal hilum a 3.5 cm lobular mass causing some mild left-sided hydronephrosis.     Mr. Nabeel Hurd was referred to Dr. David Washington who saw him on 1/22/2019 anticipating plans for a curative resection.      Dr. Darren Clark received a phone call on 2/20/2019 from Dr. Nancy Long , indicating that the previously documented an abdominal pancreatic area mass had doubled in size and was causing compression into the left kidney/renal pelvis producing a hydroureter.      Dr. Nancy Long arranged a referral to Dr. Hema Dow who will be placing a stent 3/8/2019.     Mr. Nabeel Hurd was scheduled to be seen by gastroenterology at [facility] on 3/13/2019 for EGD/EUS assessment and biopsy of the enlarging peripancreatic/duodenal area mass.   With similar mediastinal adenopathy. · Questionable right hilar adenopathy with diminished assessment due to lack of IV contrast.    · Advanced emphysema with severe pulmonary fibrosis. · No new pulmonary nodules.     CT abdomen and pelvis without contrast at [facility] on 1/9/2020 was compared to 10/17/2019 revealing:   · Mildly prominent aortocaval RP lymph nodes with position just below the level of the renal vein worrisome for potential lymphatic metastases. This is similar to 10/17/2019 but increased from 4/26/2019. · Pneumobilia without discrete suspicious focal lesion in the liver. · Wall thickening of the duodenum. · Similar and stable renal lesions favoring cysts.     He has had numerous endoscopies within the past year. While he was having an Endo EUS and had cardiac issues and was actually admitted to the intensive care unit. CT abdomen and pelvis without contrast on 7/16/2020 at [facility] was compared to 1/9/2020 and documented:  · 1.5 x 0.9 cm aortocaval lymph node, previously 1.9 x 1.2 cm  · No new abnormality seen    CT chest without contrast on 11/12/2020 at [facility] ws compared to 1/9/2020 and documented:  · Mixed response therapy with interval decrease in size in the RIGHT upper lobe pulmonary nodule but increase in size and mediastinal lymph nodes. · In addition, there is severe emphysema with findings of severe pulmonary fibrosis. Interval worsening of interstitial opacities bilaterally as well as suggestion of some pleural nodularity. Lymphangitic spread of malignancy should be considered. · Small pleural effusion on the RIGHT is new     CT abdomen and pelvis without contrast on 11/12/2020 at [facility] was compared to 7/16/2020 and documented:  · The aortic caval node described on the comparison study is not significantly changed in size when measured at the same location. · Nonobstructing renal calculus on the LEFT.  No change in the indeterminate renal lesions on the LEFT, likely cysts.  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     · Susy Hercules has benign prostatic hypertrophy (BPH)  that is stable on Flomax. · Orthostatic hypotension resolved with adjustment of metoprolol by Dr. Sj Alfonso   · He takes Tambocor, metoprolol without new cardiac issues. · Protonix continues without any new exacerbations of GERD. · Lower extremity edema overall has actually improved to some degree. Prabhjot received his final cycle #25 of Keytruda/Alimta on 10/29/2020. Treatment was held since that time due to issues of dyspnea and shortness of breath. A course of steroids (prednisone) was initiated and antibiotics also delivered and although his respiratory issues improved, he has continued to have issues with weight loss. CXR on 12/17/2020 documented no interval change, but in my personal review of the x-ray, there is significant pulmonary fibrosis not significantly changed compared to the x-ray from October 2020. I discussed the findings on the chest x-ray and the comparison at his clinic visit on 12/30/2020. He does not appear to be improving but rather quite stable. Nadeem Garcia has pulmonary fibrosis as documented on a CT scan of the chest without contrast at [facility] on 11/12/2020. Susy Hercules is in agreement to go on a chemotherapy holiday with a repeat CT scan of the chest in 3 months and monitor his situation clinically and radiographically without systemic intervention going forward. CT CHEST WO  contrast on 3/17/2021, compared to 11/12/2020 at [facility] documented:   · Mixed response therapy with interval decrease in size in the RIGHT upper lobe pulmonary nodule but increase in size and mediastinal lymph nodes.   · Slight interval decrease in size in the RIGHT upper lobe nodule/mass measuring 4.7 x 2.2 cm today, previously 5.0 x 2.4 cm. RIGHT apical nodular density measuring up to 0.6 cm, previously 0.4 cm. · In addition, there is severe emphysema with findings of severe pulmonary fibrosis. Interval worsening of interstitial opacities bilaterally as well as suggestion of some pleural nodularity. Lymphangitic spread of malignancy should be considered. · Small pleural effusion on the RIGHT is new. CT ABD & PELVIS  WO contrast on 3/17/2021, compared to 11/12/2020,  at [facility] documented:  · Nonobstructing calculus lower pole left kidney  · Low-density lesions associated with the left renal contour probably proteinaceous cyst these are unchanged  · Chronic right lateral pleural complex in the lung base with bilateral small basilar pneumothoraces. .        TREATMENT SUMMARY  · Keytruda, carboplatin, Alimta initiated 4/18/2019 to be given every 3 weeks for 4 cycles - 4th cycle 7/5/2019, then Keytruda + Alimta as maintenance to continue indefinitely until progression or intolerable side effects   · Prabhjot received cycle #25 of Shamar Simmers on 10/29/2020. He was then placed on an indefinite chemotherapy holiday on 12/30/2020             #2 TUMOR HISTORY: Pancreatic mass diagnosed 10/29/03  Mr. Veronica Jackman presented in October 2003 with obstructive jaundice. A CT scan of the abdomen on 10/26/2003 documented a 3.2 x 4 x 4.2 cm mass in the head of the pancreas.      On 10/29/03 Dr. Naya Correa performed a resection of a portion of the head of the pancreas on Marzella Landry along with a Mitul-en-Y procedure and a choledochojejunostomy for what was felt to be a pancreatic cancer. His pancreas was bypassed because of the findings. Pathology of the biopsies were negative for malignancy showing a fibrosed pancreas.    Mr. Veronica Jackman was referred to Dr. Sisi Mathis in [place].     Dr. Sisi Mathis performed ERCP with an EUS and biopsy on 02/26/04   Pathology again was negative for cancer or malignancy. Routine periodic serologic and radiographic monitoring has not disclosed progression of the mass or development of new malignancy or increase in pancreatic tumor markers.      A CT scan of the abdomen and pelvis with contrast on 9/12/2018 documented a 4.9 x 4 x 4 cm soft tissue mass with peripheral calcification immediately adjacent to the gallbladder in the head of the pancreas area.     An MRI of the abdomen and pelvis W & WO on 10/11/2018 identified in the 4.1 x 3.4 cm soft tissue mass in the subhepatic space, abutting the second portion of the duodenum with mild displacement of the duodenum. There does not appear to be involvement of the pancreas, and the pancreas appears within normal limits. Differential diagnoses include a desmoid tumor, less likely leiomyoma of the wall of the duodenum or malignancy.     CT of the abdomen and pelvis without contrast on 3/20/2019 at [facility] was compared to outpatient CT data and pelvis on 9/12/2018 an MRI of the abdomen from 10/11/2018. A soft tissue mass was again encountered in the subhepatic space which abutted the distal stomach and proximal duodenum measuring 8.9 x 5.4 cm which has increased considerably from a prior measurement of 4.1 x 3.4 cm. Medial to the left renal hilum a 3.5 cm lobular mass causing some mild left-sided hydronephrosis.     Mr. Rufino Forbes was scheduled for an EGD/EUS by Dr. To Mckeon as an outpatient procedure on 3/13/2019 for assessment and biopsy of the enlarging peripancreatic/duodenal area mass. Unfortunately, after initiation of the procedure, he began having ventricular tachycardia and the procedure had to be aborted. Yassine Randall was transferred to the ICU for cardiology evaluation and stabilization.  He remained hospitalized until 3/18/2019 when he was medically cleared for discharge.     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 No emergent urologic intervention was warranted and he received monitoring of renal function. He had a creatinine level of 1.9 at discharge.     PET scan on 4/2/2019 identified a soft tissue mass in the RUL measuring 1.2 x 3.5 cm with extension to the right anterior hilum with an SUV of 10.1. Evidence of mediastinal and hilar lymphadenopathy, with low-level metabolic activity. Interval increase in the size of a large mass in the right upper abdomen inseparable from the duodenum measuring 10.7 x 6.9 cm compared to 5.4 x 8.9 cm on 2/20/2019 with an SUV of 23.6. Slight increase in 2 small inseparable masses medial to the hilum of the left kidney measuring 2.2 and 2.6 cm and the other measuring 3.9 cm with a maximum SUV of 18. 6.     Cycle #1 of palliative chemotherapy with Carboplatin, Alimta and Keytruda was initiated 4/18/19 which should also cover this process.     Abdominal/pelvic CT 4/26/19 was performed at [facility] due to abdominal pain and melena. The RUQ mass, within the duodenum decreased to 6.8 x 6.2 cm (was 10.7 x 6.9 cm on PET 4/2/19)     CT abdomen and pelvis without contrast at [facility] on 6/27/2019 was compared to 4/26/2019 revealed complete resolution of the previously identified. Duodenal/subhepatic space soft tissue mass. The previously identified heterogenous enhancing lesion in the left renal pelvis was much less prominent but there does continue to be some mild left caliectasis.     CT chest without contrast at [facility] on 1/9/2020 was compared to 10/17/2019 revealing:   · A stable spiculated RUL nodule/mass with similar mediastinal adenopathy. · Questionable right hilar adenopathy with diminished assessment due to lack of IV contrast.    · Advanced emphysema with severe pulmonary fibrosis.     · No new pulmonary nodules.     CT abdomen and pelvis without contrast at [facility] on 1/9/2020 was compared to 10/17/2019 revealing:   · Mildly prominent aortocaval RP lymph nodes with position just below the level of the renal vein worrisome for potential lymphatic metastases. This is similar to 10/17/2019 but increased from 4/26/2019. · Pneumobilia without discrete suspicious focal lesion in the liver. · Wall thickening of the duodenum. · Similar and stable renal lesions favoring cysts.     CT ABD & PELVIS  WO contrast on 3/17/2021, compared to 11/12/2020,  at [facility] documented:  · Nonobstructing calculus lower pole left kidney  · Low-density lesions associated with the left renal contour probably proteinaceous cyst these are unchanged  · Chronic right lateral pleural complex in the lung base with bilateral small basilar pneumothoraces. .     He has had numerous endoscopies within the past year. When he was having an Endo EUS he had cardiac issues and required to the intensive care unit. This area will just be monitored on follow-up scans without further elective endoscopic surveillance. .           #1 HEMATOLOGICAL HISTORY: IgG kappa MGUS- Dx: 02/23/06. During the course of Mr. Meredith Sahu follow up, an abnormally elevated protein was noted on one occasion, which led to workup including quantitative immunoglobulins.      An elevated IgG kappa was encountered. This has remained stable in the 2500 range since early 2006.      A bone marrow biopsy and aspirate on 02/23/06 was negative with only 4% plasma cells.      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      24 hour urine for immunoelectrophoresis did not reveal an M-spike. Serology studies on 9/18/2018 documented an elevated, stable IgG of 2589 with an M spike of 0.7. Immunofixation continued to reveal IgG monoclonal protein with kappa light chain specificity.       #2 HEMATOLOGICAL HISTORY: Iron deficiency anemia, 9/18/2018  Conner Maria had serology on 9/18/2018 that identified iron deficiency anemia.    His lab work was obtained at [facility].  An iron level was 12, iron saturation 7%, ferritin 256  Folate >20, and vitamin B12 1044. Dr. Mihaela Pacheco placed Sophia on ferrous sulfate 325 mg daily on 9/25/2018 , but this failed to resolve the anemia.     An EGD by Dr. Randell Nj on 12/11/2018 documented a normal esophagus, normal stomach and a degree of duodenal deformity. Gastric biopsy was urease negative.     Colonoscopy by Dr. Randell Nj on 1/9/2019 documented a polyp at 80 cm. Pathology identified a tubular adenoma, negative for high-grade dysplasia. Feraheme administered.     Labs on 3/13/2019:  · Iron 25   · Iron saturation 22%   · TIBC 116   · Ferritin >2000   · Reticulocyte count 0.0578   ·    · Haptoglobin 341   · Folate >20   · Vitamin B12 945   · Erythropoietin 13     He presented to [facility] on 4/26/2019 with melena and abdominal discomfort. He was subsequently admitted     EGD per Dr. Harry Kc on 4/29/2019 revealed  · LA grade (1 or more mucosal breaks greater than 5 mm, not extending between the tops of the 2 mucosal folds)? esophagitis with no bleeding found in the distal esophagus   · Stomach was normal, biopsies for H. pylori taken. · Cardia and gastric fundus were normal on retroflexion   · One nonbleeding cratered duodenal ulcer with no stigmata of bleeding was found in the duodenal bulb lesion was about 9 mm. · Many nonbleeding cratered, linear and superficial duodenal ulcers with no stigmata of bleeding were found in the second portion of the duodenum. The largest lesion was 6 mm in largest dimension. No mass encountered and anastomosis not seen.     He was admitted to [facility] on 5/8/2019 with melena, hematochezia, anemia, thrombocytopenia     Endoscopy performed by Dr. Deyvi Zaragoza on 5/9/2019 revealed  · Normal esophagus   · Gastric mucosal variant. 2 erythematous spots in the antrum, ? AVM   · Normal examined duodenum   · Nothing found to account for symptoms   He developed pancytopenia from chemotherapy and did require transfusions.   His hemoglobin dropped to 7.3 on 6/25/2019 after his last treatment. He received 2 units packed red blood cells as an outpatient.     Serology 6/13/2019  Serum Fe - 117  TIBC - 587  Fe sat - 19.9%  Ferritin - 1,000  Iron levels have been adequately replaced. 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 TREATMENT SUMMARY  1. Feraheme 510 mg IV on 2/14 and 2/21/19   2. Venofer 200 mg IV daily 5/8 - 5/10/2019 as IP at [facility]   3. s/p 2 units pRBC on 4/26/19 and 2 units pRBC on 4/29/2019 as IP at [facility]?  s/p 2 units pRBC on?5/8/2019   s/p 2 pheresis plts on 5/8/2019  s/p 2 units pRBC as OP 6/26/2019    Allergies:  Penicillins    Medicines:  Current Outpatient Medications   Medication Sig Dispense Refill    spironolactone (ALDACTONE) 25 MG tablet Take 25 mg by mouth daily      flecainide (TAMBOCOR) 50 MG tablet Take 50 mg by mouth 2 times daily      sodium bicarbonate 650 MG tablet Take 650 mg by mouth 2 times daily      pantoprazole (PROTONIX) 40 MG tablet Take 40 mg by mouth daily      tamsulosin (FLOMAX) 0.4 MG capsule Take 0.4 mg by mouth daily      Multiple Vitamin (MULTI VITAMIN DAILY PO) Take by mouth daily        No current facility-administered medications for this visit.        Past Medical History:      Diagnosis Date    Abnormal weight loss     Anemia     Anemia, unspecified     Blind left eye     Cardiomyopathy (HCC)     with compensated CHF    Cellulitis     Cellulitis of unspecified part of limb     Chronic kidney disease, stage IV (severe) (HCC)     Dr. Qi Manrique COPD (chronic obstructive pulmonary disease) ([facility] [address]     Duodenal ulcer     Essential hypertension 10/2/2017    Eye injury     at age 12 from penetrating injury    Gout     HTN (hypertension)     Hydronephrosis     Hydronephrosis, left     Liver abscess     resolved    Lung nodule     Malignant neoplasm ([address]     Malignant neoplasm of upper lobe, right bronchus or lung ([facility] [address]     MGUS (monoclonal gammopathy of unknown significance)     secondary to an IgG kappa. IgG level in 2,000 range    Monoclonal gammopathy     [facility]M [facility]nonischemic cardiomyopathy) ([facility]     Non-[facility] cancer ([facility] 2019    Non-sustained ventricular tachycardia (HCC)     NSVT (nonsustained ventricular tachycardia) (HCC)     Osteoarthritis     Other fatigue     Other iron deficiency anemias     Secondary malignant neoplasm of other digestive organs ([facility]     Weight loss         Past Surgical History:      Procedure Laterality Date    BRONCHOSCOPY  2018    dx of adenocarcinoma RUL  Dr. An Fine CATH LAB PROCEDURE      ENDOSCOPY, COLON, DIAGNOSTIC  2019    aborted d/t vtach    EYE SURGERY Left     EYE SURGERY  1964    FOOT SURGERY      removal of joint from right toe from drilling accident, [facility]  10/29/2003    with resection  Mitul-en-Y proecedure  DR. Mcfarlane    TUNNELED VENOUS PORT PLACEMENT      UPPER GASTROINTESTINAL ENDOSCOPY N/A 3/13/2019    EGD W/EUS FNA performed by Jane Chowdhury MD at [facility]        Family History:      Problem Relation Age of Onset    Osteoporosis Mother     High Blood Pressure Mother     Asthma Mother     Bleeding Prob Father     Cancer Father         leukemia    Asthma Brother         Social History  Social History     Tobacco Use    Smoking status: Former Smoker     Packs/day: 2.00     Years: 40.00     Pack years: 80.00     Types: Cigarettes     Quit date: 10/29/2003     Years since quittin.6    Smokeless tobacco: Never Used   Vaping Use    Vaping Use: Never assessed   Substance Use Topics    Alcohol use: No    Drug use: No          Review of Systems:  Constitutional: Negative for chills, fatigue, fever or significant weight loss. HENT: Negative for congestion, hearing loss, nosebleeds or sore throat.     Eyes: Negative for photophobia, pain, discharge, redness and visual disturbance. Respiratory: Negative for cough, shortness of breath, or wheezing. Cardiovascular: Negative for chest pain, palpitations or leg swelling. Gastrointestinal: Negative for abdominal pain, blood in stool, constipation, diarrhea, nausea or vomiting. Genitourinary: Negative for dysuria, flank pain, frequency, hematuria or urgency. Musculoskeletal: Negative for back pain, joint swelling, myalgias or neck pain. Skin: Negative for rash or petechiae. Neurological: Negative for tremors, seizures, syncope, weakness or headaches. Hematological: No active bruising or bleeding. Psychiatric/Behavioral: Negative for hallucinations. Objective:  Vital Signs: Blood pressure 110/60, pulse 88, height 5' 4\" (1.626 m), weight 152 lb 12.8 oz (69.3 kg), SpO2 98 %. Physical Exam   Constitutional: Oriented to person, place, and time. No acute distress. Head: Normocephalic and atraumatic. Nose: Nose normal.   Mouth/Throat: Oropharynx is clear and moist. No oropharyngeal exudate. Eyes: Pupils are equal and round. Conjunctivae and EOM are normal. No scleral icterus. Neck: Normal range of motion. Neck supple. No JVD. No appreciable thyromegaly. Cardiovascular: Normal rate, regular rhythm, normal heart sounds and intact distal pulses. Exam reveals no gallop, murmurs or friction rub. Pulmonary/Chest: Effort normal and breath sounds normal. No respiratory distress. No wheezes. Abdominal: Soft. Bowel sounds are normal. No organomegally or masses. No tenderness. There is no rebound and no guarding. Musculoskeletal: Normal range of motion. No edema or tenderness. Lymphadenopathy: No cervical, axillary or inguinal lymphadenopathy. Neurological: Alert and oriented to person, place, and time. Cranial nerves are intact. Neurological exam is nonfocal  Skin: Skin is warm and dry. No rash noted. No erythema. No pallor.    Psychiatric: Judgment normal.     Labs:  BMP:   No results for input(s): NA, K, CL, CO2, PHOS, BUN, CREATININE, CALCIUM in the last 72 hours. CBC:   Recent Labs     06/16/21  1332   WBC 6.04   HGB 13.0*   HCT 41.2   MCV 97.2*   *     LIVER PROFILE:   No results for input(s): AST, ALT, LIPASE, BILIDIR, BILITOT, ALKPHOS in the last 72 hours. Invalid input(s): AMYLASE,  ALB  PT/INR: No results for input(s): PROTIME, INR in the last 72 hours. APTT: No results for input(s): APTT in the last 72 hours. BNP:  No results for input(s): BNP in the last 72 hours. Ionized Calcium:No results for input(s): IONCA in the last 72 hours. Magnesium:No results for input(s): MG in the last 72 hours. Phosphorus:No results for input(s): PHOS in the last 72 hours. HgbA1C: No results for input(s): LABA1C in the last 72 hours. Hepatic:   No results for input(s): ALKPHOS, ALT, AST, PROT, BILITOT, BILIDIR, LABALBU in the last 72 hours. Lactic Acid: No results for input(s): LACTA in the last 72 hours. Troponin: No results for input(s): CKTOTAL, CKMB, TROPONINT in the last 72 hours. ABGs: No results for input(s): PH, PCO2, PO2, HCO3, O2SAT in the last 72 hours. CRP:  No results for input(s): CRP in the last 72 hours. Sed Rate:  No results for input(s): SEDRATE in the last 72 hours. Cultures:   No results for input(s): CULTURE in the last 72 hours. Radiology reports as per the Radiologist  Radiology: No results found. ASSESSMENT AND PLAN:  #1.    Taylor Corona is a 68 y.o.  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    · CKD and chemotherapy associated anemia, on Procrit  · BPH on Flomax  · Orthostatic hypotension medications managed by Dr. Lisa Arthur    He completed 4 cycles of combination chemotherapy with carboplatin, Keytruda, Alimta on 7/5/2019.    He was then changed to maintenance therapy with Keytruda and Alimta every 3 weeks. 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   · CKD associated anemia responding to Procrit.     · Alon Mendez has benign prostatic hypertrophy (BPH)  that is stable on Flomax. · Orthostatic hypotension resolved with adjustment of metoprolol by Dr. Kim Collins   · He takes Tambocor, metoprolol without new cardiac issues. · Protonix continues without any new exacerbations of GERD. · Lower extremity edema overall has actually improved to some degree. Prabhjot received his final cycle #25 of Keytruda/Alimta on 10/29/2020. Treatment was held since that time due to issues of dyspnea and shortness of breath. A course of steroids (prednisone) was initiated and antibiotics also delivered and although his respiratory issues improved, he has continued to have issues with weight loss. Abigail Bhagat is continuing to have dyspnea to moderate exertion. Not able to get his breath completely. He uses supplemental oxygen sparingly. Mr. Isac Jauregui presents today for monitoring, assessment and for further recommendations. Physical examination today, 6/16/2021:    No evidence of palpable peripheral lymphadenopathy. Chronic rales bilaterally are noted in upper and lower lung fields anteriorly and posteriorly. Heart is regular normal S1 and S2, no tachycardia. Abdominal exam is benign. Neurological exam is intact with intact mental status and nonfocal neurological exam.  Brief cranial nerves exam are all intact. Serology on 12/17/2020 revealed:  CMP with glucose 183, BUN 23, creatinine 1.8, calcium 10.4, albumin 3.1, Alk Phos 131, ALT 18, AST 71  TSH- 1.410    CXR on 12/17/2020 documented no interval change, but in my personal review of the x-ray, there is significant pulmonary fibrosis not significantly changed compared to the x-ray from October 2020.     I discussed the findings on the chest x-ray and the comparison at his clinic visit on 12/30/2020. He does not appear to be improving but rather quite stable. Reji Mandujano has pulmonary fibrosis as documented on a CT scan of the chest without contrast at [facility] on 11/12/2020. Christian Bell is in agreement to go on a chemotherapy holiday with a repeat CT scan of the chest in 3 months and monitor his situation clinically and radiographically without systemic intervention going forward. CT CHEST WO  contrast on 3/17/2021, compared to 11/12/2020 at [facility] documented:   · Mixed response therapy with interval decrease in size in the RIGHT upper lobe pulmonary nodule but increase in size and mediastinal lymph nodes. · Slight interval decrease in size in the RIGHT upper lobe nodule/mass measuring 3.3 x 2.0 cm today, previously 4.7 x 2.2 cm. RIGHT apical nodular density measuring up to 0.6 cm, previously 0.4 cm. · In addition, there is severe emphysema with findings of severe pulmonary fibrosis. Interval worsening of interstitial opacities bilaterally as well as suggestion of some pleural nodularity. Lymphangitic spread of malignancy should be considered. · Small pleural effusion on the RIGHT is new. CT ABD & PELVIS  WO contrast on 3/17/2021, compared to 11/12/2020,  at [facility] documented:  · Nonobstructing calculus lower pole left kidney  · Low-density lesions associated with the left renal contour probably proteinaceous cyst these are unchanged  · Chronic right lateral pleural complex in the lung base with bilateral small basilar pneumothoraces. .     XR CHEST (2 VW) 4/21/2021 at [address] , compared to December 17, 2020, documented:  · Advanced interstitial fibrotic changes noted throughout the pulmonary parenchyma unchanged from December 17, 2020. · Small to moderate right lateral pleural complex. This may be pleural fluid or thickening is unchanged December 17, 2020. He is clinically stable without signs to suggest new or recurrent disease.   I wonder if some of his cardiac medications could be causing or worsening the pulmonary fibrosis. I suggested to him that he talk to his cardiology office regarding probably discontinuing flecainide (Tambocor) to minimize further pulmonary fibrosis. He says he will get in touch with him. I will see Mr. Zunilda Pena back in follow-up in 3 months    #2   Anemia of chronic renal failure and chemotherapy associated anemia  Procrit 20,000 units weekly for Hgb < 11.0  is given as indicated to decrease transfusion requirements. CBC on 3/24/2021 reveals a WBC of  7.93. Hgb is 11.8 with an MCV of 97.3  and platelet count of 014,006 . Procrit is NOT indicated today. He will return monthly for CBC checks and Procrit as indicated for CKD associated anemia        CBC today 6/16/2021  reveals a WBC of 6.04 . Hgb is 13.0  with an MCV of 97.2 and platelet count of 537,715 . Procrit NOT indicated. I will monitor his CBC only when he comes office given the satisfactory recovery off of chemotherapy. Daphne Bach am scribing for Jhon Olvera MD. Electronically signed by Jovana Martinez RN on 6/16/2021 at 2:20 PM        Ajay Encarnacion was seen today for follow-up. Diagnoses and all orders for this visit:    Non-small cell lung cancer, unspecified laterality ([address]  -     XR CHEST STANDARD (2 VW); Future    Chronic kidney disease, stage IV (severe) ([address]    Health care maintenance    Anemia, chronic renal failure, stage 3 (moderate) ([facility])        Orders Placed This Encounter   Procedures    XR CHEST STANDARD (2 VW)     Hx lung cancer, evaluate fibrotic changes; compare to 4/21/2021     Standing Status:   Future     Standing Expiration Date:   6/16/2022     Order Specific Question:   Reason for exam:     Answer:   hx lung cancer, evaluate fibrotic changes; compare to 4/21/2021       No orders of the defined types were placed in this encounter. Return in 3 months (on 9/16/2021) for follow-up with .          SRAVAN, Dr. Hung Mathews John Cesar, personally performed the services described in this documentation as scribed by [facility] LPN in my presence, and it is both accurate and complete.

## 2021-06-17 ENCOUNTER — TELEPHONE (OUTPATIENT)
Dept: CARDIOLOGY CLINIC | Age: 81
End: 2021-06-17

## 2021-06-17 NOTE — TELEPHONE ENCOUNTER
Spoke with patient and he states that Dr. Esther Rahman stopped metoprolol about a year ago. He was getting low BP and falling. I advised he can stop flecainide and he will come tomorrow and get a ZIO put on.

## 2021-06-17 NOTE — TELEPHONE ENCOUNTER
Patient called stating he saw Dr. Jaydon Lr yesterday and he advised he call our office to see if he can stop flecainide due to scar tissue in lungs. Patient states he has been having terrible breathing problems. Dr. Lisseth Jurado note below:    He is clinically stable without signs to suggest new or recurrent disease. I wonder if some of his cardiac medications could be causing or worsening the pulmonary fibrosis. I suggested to him that he talk to his cardiology office regarding probably discontinuing flecainide (Tambocor) to minimize further pulmonary fibrosis. He says he will get in touch with him.

## 2021-07-15 ENCOUNTER — TELEPHONE (OUTPATIENT)
Dept: CARDIOLOGY CLINIC | Age: 81
End: 2021-07-15

## 2021-07-15 RX ORDER — METOPROLOL SUCCINATE 25 MG/1
12.5 TABLET, EXTENDED RELEASE ORAL DAILY
Qty: 45 TABLET | Refills: 3 | Status: SHIPPED | OUTPATIENT
Start: 2021-07-15 | End: 2022-07-05

## 2021-07-15 NOTE — TELEPHONE ENCOUNTER
Patient aware of zio results and canceled mondays appointment. Patient will call back if New medication is too expensive and let Sahara Alex know.

## 2021-08-25 ENCOUNTER — HOSPITAL ENCOUNTER (INPATIENT)
Facility: HOSPITAL | Age: 81
LOS: 2 days | Discharge: HOME | End: 2021-08-27
Attending: INTERNAL MEDICINE | Admitting: FAMILY MEDICINE

## 2021-08-25 ENCOUNTER — APPOINTMENT (OUTPATIENT)
Dept: GENERAL RADIOLOGY | Facility: HOSPITAL | Age: 81
End: 2021-08-25

## 2021-08-25 ENCOUNTER — APPOINTMENT (OUTPATIENT)
Dept: CT IMAGING | Facility: HOSPITAL | Age: 81
End: 2021-08-25

## 2021-08-25 DIAGNOSIS — J93.9 PNEUMOTHORAX, UNSPECIFIED TYPE: Primary | ICD-10-CM

## 2021-08-25 DIAGNOSIS — J18.9 PNEUMONIA OF RIGHT LUNG DUE TO INFECTIOUS ORGANISM, UNSPECIFIED PART OF LUNG: ICD-10-CM

## 2021-08-25 DIAGNOSIS — J93.83 OTHER PNEUMOTHORAX: ICD-10-CM

## 2021-08-25 DIAGNOSIS — R91.8 LUNG MASS: ICD-10-CM

## 2021-08-25 DIAGNOSIS — N17.9 AKI (ACUTE KIDNEY INJURY) (HCC): ICD-10-CM

## 2021-08-25 DIAGNOSIS — E87.5 HYPERKALEMIA: ICD-10-CM

## 2021-08-25 PROBLEM — N18.9 ACUTE RENAL FAILURE SUPERIMPOSED ON CHRONIC KIDNEY DISEASE (HCC): Status: ACTIVE | Noted: 2021-08-25

## 2021-08-25 LAB
ALBUMIN SERPL-MCNC: 3.7 G/DL (ref 3.5–5.2)
ALBUMIN/GLOB SERPL: 0.8 G/DL
ALP SERPL-CCNC: 178 U/L (ref 39–117)
ALT SERPL W P-5'-P-CCNC: 19 U/L (ref 1–41)
ANION GAP SERPL CALCULATED.3IONS-SCNC: 11 MMOL/L (ref 5–15)
ANION GAP SERPL CALCULATED.3IONS-SCNC: 2 MMOL/L (ref 5–15)
ANION GAP SERPL CALCULATED.3IONS-SCNC: 8 MMOL/L (ref 5–15)
APTT PPP: 30.2 SECONDS (ref 24.1–35)
ARTERIAL PATENCY WRIST A: POSITIVE
AST SERPL-CCNC: 24 U/L (ref 1–40)
ATMOSPHERIC PRESS: 754 MMHG
BASE EXCESS BLDA CALC-SCNC: -6.9 MMOL/L (ref 0–2)
BASOPHILS # BLD AUTO: 0.05 10*3/MM3 (ref 0–0.2)
BASOPHILS NFR BLD AUTO: 0.7 % (ref 0–1.5)
BDY SITE: ABNORMAL
BILIRUB SERPL-MCNC: 0.3 MG/DL (ref 0–1.2)
BODY TEMPERATURE: 37 C
BUN SERPL-MCNC: 53 MG/DL (ref 8–23)
BUN SERPL-MCNC: 54 MG/DL (ref 8–23)
BUN SERPL-MCNC: 55 MG/DL (ref 8–23)
BUN/CREAT SERPL: 21.4 (ref 7–25)
BUN/CREAT SERPL: 21.8 (ref 7–25)
BUN/CREAT SERPL: 21.9 (ref 7–25)
CALCIUM SPEC-SCNC: 10.1 MG/DL (ref 8.6–10.5)
CALCIUM SPEC-SCNC: 10.2 MG/DL (ref 8.6–10.5)
CALCIUM SPEC-SCNC: 10.5 MG/DL (ref 8.6–10.5)
CHLORIDE SERPL-SCNC: 106 MMOL/L (ref 98–107)
CHLORIDE SERPL-SCNC: 108 MMOL/L (ref 98–107)
CHLORIDE SERPL-SCNC: 114 MMOL/L (ref 98–107)
CO2 SERPL-SCNC: 19 MMOL/L (ref 22–29)
CO2 SERPL-SCNC: 19 MMOL/L (ref 22–29)
CO2 SERPL-SCNC: 21 MMOL/L (ref 22–29)
CREAT SERPL-MCNC: 2.43 MG/DL (ref 0.76–1.27)
CREAT SERPL-MCNC: 2.51 MG/DL (ref 0.76–1.27)
CREAT SERPL-MCNC: 2.52 MG/DL (ref 0.76–1.27)
D DIMER PPP FEU-MCNC: 1.04 MG/L (FEU) (ref 0–0.5)
D-LACTATE SERPL-SCNC: 1.7 MMOL/L (ref 0.5–2)
DEPRECATED RDW RBC AUTO: 53.5 FL (ref 37–54)
EOSINOPHIL # BLD AUTO: 0.19 10*3/MM3 (ref 0–0.4)
EOSINOPHIL NFR BLD AUTO: 2.6 % (ref 0.3–6.2)
ERYTHROCYTE [DISTWIDTH] IN BLOOD BY AUTOMATED COUNT: 14.9 % (ref 12.3–15.4)
GFR SERPL CREATININE-BSD FRML MDRD: 25 ML/MIN/1.73
GFR SERPL CREATININE-BSD FRML MDRD: 25 ML/MIN/1.73
GFR SERPL CREATININE-BSD FRML MDRD: 26 ML/MIN/1.73
GLOBULIN UR ELPH-MCNC: 4.6 GM/DL
GLUCOSE BLDC GLUCOMTR-MCNC: 51 MG/DL (ref 70–130)
GLUCOSE BLDC GLUCOMTR-MCNC: 93 MG/DL (ref 70–130)
GLUCOSE SERPL-MCNC: 111 MG/DL (ref 65–99)
GLUCOSE SERPL-MCNC: 145 MG/DL (ref 65–99)
GLUCOSE SERPL-MCNC: 58 MG/DL (ref 65–99)
HCO3 BLDA-SCNC: 18.5 MMOL/L (ref 20–26)
HCT VFR BLD AUTO: 43.6 % (ref 37.5–51)
HGB BLD-MCNC: 13.8 G/DL (ref 13–17.7)
IMM GRANULOCYTES # BLD AUTO: 0.04 10*3/MM3 (ref 0–0.05)
IMM GRANULOCYTES NFR BLD AUTO: 0.5 % (ref 0–0.5)
INR PPP: 1.18 (ref 0.91–1.09)
LYMPHOCYTES # BLD AUTO: 0.83 10*3/MM3 (ref 0.7–3.1)
LYMPHOCYTES NFR BLD AUTO: 11.4 % (ref 19.6–45.3)
Lab: ABNORMAL
MCH RBC QN AUTO: 30.7 PG (ref 26.6–33)
MCHC RBC AUTO-ENTMCNC: 31.7 G/DL (ref 31.5–35.7)
MCV RBC AUTO: 97.1 FL (ref 79–97)
MODALITY: ABNORMAL
MONOCYTES # BLD AUTO: 0.71 10*3/MM3 (ref 0.1–0.9)
MONOCYTES NFR BLD AUTO: 9.7 % (ref 5–12)
NEUTROPHILS NFR BLD AUTO: 5.47 10*3/MM3 (ref 1.7–7)
NEUTROPHILS NFR BLD AUTO: 75.1 % (ref 42.7–76)
NRBC BLD AUTO-RTO: 0 /100 WBC (ref 0–0.2)
NT-PROBNP SERPL-MCNC: 204.3 PG/ML (ref 0–1800)
PCO2 BLDA: 36.2 MM HG (ref 35–45)
PCO2 TEMP ADJ BLD: 36.2 MM HG (ref 35–45)
PH BLDA: 7.32 PH UNITS (ref 7.35–7.45)
PH, TEMP CORRECTED: 7.32 PH UNITS (ref 7.35–7.45)
PLATELET # BLD AUTO: 151 10*3/MM3 (ref 140–450)
PMV BLD AUTO: 11.6 FL (ref 6–12)
PO2 BLDA: 78.8 MM HG (ref 83–108)
PO2 TEMP ADJ BLD: 78.8 MM HG (ref 83–108)
POTASSIUM SERPL-SCNC: 5.3 MMOL/L (ref 3.5–5.2)
POTASSIUM SERPL-SCNC: 6.9 MMOL/L (ref 3.5–5.2)
POTASSIUM SERPL-SCNC: 7 MMOL/L (ref 3.5–5.2)
PROT SERPL-MCNC: 8.3 G/DL (ref 6–8.5)
PROTHROMBIN TIME: 14.1 SECONDS (ref 11.5–13.4)
RBC # BLD AUTO: 4.49 10*6/MM3 (ref 4.14–5.8)
SAO2 % BLDCOA: 95.7 % (ref 94–99)
SARS-COV-2 RNA PNL SPEC NAA+PROBE: NOT DETECTED
SODIUM SERPL-SCNC: 135 MMOL/L (ref 136–145)
SODIUM SERPL-SCNC: 136 MMOL/L (ref 136–145)
SODIUM SERPL-SCNC: 137 MMOL/L (ref 136–145)
TROPONIN T SERPL-MCNC: 0.03 NG/ML (ref 0–0.03)
TROPONIN T SERPL-MCNC: 0.04 NG/ML (ref 0–0.03)
TROPONIN T SERPL-MCNC: 0.04 NG/ML (ref 0–0.03)
VENTILATOR MODE: ABNORMAL
WBC # BLD AUTO: 7.29 10*3/MM3 (ref 3.4–10.8)

## 2021-08-25 PROCEDURE — 87040 BLOOD CULTURE FOR BACTERIA: CPT | Performed by: NURSE PRACTITIONER

## 2021-08-25 PROCEDURE — 71045 X-RAY EXAM CHEST 1 VIEW: CPT

## 2021-08-25 PROCEDURE — 85025 COMPLETE CBC W/AUTO DIFF WBC: CPT | Performed by: NURSE PRACTITIONER

## 2021-08-25 PROCEDURE — 63710000001 INSULIN REGULAR HUMAN PER 5 UNITS: Performed by: FAMILY MEDICINE

## 2021-08-25 PROCEDURE — 36415 COLL VENOUS BLD VENIPUNCTURE: CPT | Performed by: NURSE PRACTITIONER

## 2021-08-25 PROCEDURE — 93010 ELECTROCARDIOGRAM REPORT: CPT | Performed by: INTERNAL MEDICINE

## 2021-08-25 PROCEDURE — 84484 ASSAY OF TROPONIN QUANT: CPT | Performed by: NURSE PRACTITIONER

## 2021-08-25 PROCEDURE — 25010000002 CEFTRIAXONE PER 250 MG: Performed by: NURSE PRACTITIONER

## 2021-08-25 PROCEDURE — 25010000002 CALCIUM GLUCONATE PER 10 ML: Performed by: FAMILY MEDICINE

## 2021-08-25 PROCEDURE — 25010000002 CALCIUM GLUCONATE PER 10 ML: Performed by: NURSE PRACTITIONER

## 2021-08-25 PROCEDURE — 85379 FIBRIN DEGRADATION QUANT: CPT | Performed by: NURSE PRACTITIONER

## 2021-08-25 PROCEDURE — 83880 ASSAY OF NATRIURETIC PEPTIDE: CPT | Performed by: NURSE PRACTITIONER

## 2021-08-25 PROCEDURE — 85610 PROTHROMBIN TIME: CPT | Performed by: NURSE PRACTITIONER

## 2021-08-25 PROCEDURE — 83605 ASSAY OF LACTIC ACID: CPT | Performed by: NURSE PRACTITIONER

## 2021-08-25 PROCEDURE — 25010000002 AZITHROMYCIN PER 500 MG: Performed by: NURSE PRACTITIONER

## 2021-08-25 PROCEDURE — 87635 SARS-COV-2 COVID-19 AMP PRB: CPT | Performed by: NURSE PRACTITIONER

## 2021-08-25 PROCEDURE — 99221 1ST HOSP IP/OBS SF/LOW 40: CPT | Performed by: THORACIC SURGERY (CARDIOTHORACIC VASCULAR SURGERY)

## 2021-08-25 PROCEDURE — 94644 CONT INHLJ TX 1ST HOUR: CPT

## 2021-08-25 PROCEDURE — 93005 ELECTROCARDIOGRAM TRACING: CPT | Performed by: NURSE PRACTITIONER

## 2021-08-25 PROCEDURE — 85730 THROMBOPLASTIN TIME PARTIAL: CPT | Performed by: NURSE PRACTITIONER

## 2021-08-25 PROCEDURE — 63710000001 INSULIN REGULAR HUMAN PER 5 UNITS: Performed by: NURSE PRACTITIONER

## 2021-08-25 PROCEDURE — 82962 GLUCOSE BLOOD TEST: CPT

## 2021-08-25 PROCEDURE — 93005 ELECTROCARDIOGRAM TRACING: CPT | Performed by: FAMILY MEDICINE

## 2021-08-25 PROCEDURE — 82803 BLOOD GASES ANY COMBINATION: CPT

## 2021-08-25 PROCEDURE — 80053 COMPREHEN METABOLIC PANEL: CPT | Performed by: NURSE PRACTITIONER

## 2021-08-25 PROCEDURE — 71250 CT THORAX DX C-: CPT

## 2021-08-25 PROCEDURE — 99284 EMERGENCY DEPT VISIT MOD MDM: CPT

## 2021-08-25 PROCEDURE — 36600 WITHDRAWAL OF ARTERIAL BLOOD: CPT

## 2021-08-25 RX ORDER — SODIUM CHLORIDE 0.9 % (FLUSH) 0.9 %
10 SYRINGE (ML) INJECTION EVERY 12 HOURS SCHEDULED
Status: DISCONTINUED | OUTPATIENT
Start: 2021-08-25 | End: 2021-08-27 | Stop reason: HOSPADM

## 2021-08-25 RX ORDER — SODIUM BICARBONATE 650 MG/1
650 TABLET ORAL 2 TIMES DAILY
Status: DISCONTINUED | OUTPATIENT
Start: 2021-08-25 | End: 2021-08-26

## 2021-08-25 RX ORDER — SODIUM CHLORIDE 0.9 % (FLUSH) 0.9 %
10 SYRINGE (ML) INJECTION AS NEEDED
Status: DISCONTINUED | OUTPATIENT
Start: 2021-08-25 | End: 2021-08-27 | Stop reason: HOSPADM

## 2021-08-25 RX ORDER — METOPROLOL SUCCINATE 25 MG/1
12.5 TABLET, EXTENDED RELEASE ORAL DAILY
Status: ON HOLD | COMMUNITY
End: 2023-01-10 | Stop reason: SDUPTHER

## 2021-08-25 RX ORDER — ONDANSETRON 2 MG/ML
4 INJECTION INTRAMUSCULAR; INTRAVENOUS EVERY 6 HOURS PRN
Status: DISCONTINUED | OUTPATIENT
Start: 2021-08-25 | End: 2021-08-27 | Stop reason: HOSPADM

## 2021-08-25 RX ORDER — PANTOPRAZOLE SODIUM 40 MG/1
40 TABLET, DELAYED RELEASE ORAL DAILY
Status: DISCONTINUED | OUTPATIENT
Start: 2021-08-25 | End: 2021-08-27 | Stop reason: HOSPADM

## 2021-08-25 RX ORDER — LISINOPRIL AND HYDROCHLOROTHIAZIDE 20; 12.5 MG/1; MG/1
1 TABLET ORAL DAILY
COMMUNITY
End: 2021-08-27 | Stop reason: HOSPADM

## 2021-08-25 RX ORDER — SPIRONOLACTONE 50 MG/1
50 TABLET, FILM COATED ORAL DAILY
COMMUNITY
End: 2021-08-27 | Stop reason: HOSPADM

## 2021-08-25 RX ORDER — ACETAMINOPHEN 160 MG/5ML
650 SOLUTION ORAL EVERY 4 HOURS PRN
Status: DISCONTINUED | OUTPATIENT
Start: 2021-08-25 | End: 2021-08-27 | Stop reason: HOSPADM

## 2021-08-25 RX ORDER — DEXTROSE MONOHYDRATE 25 G/50ML
50 INJECTION, SOLUTION INTRAVENOUS ONCE
Status: COMPLETED | OUTPATIENT
Start: 2021-08-25 | End: 2021-08-25

## 2021-08-25 RX ORDER — ONDANSETRON 4 MG/1
4 TABLET, FILM COATED ORAL EVERY 6 HOURS PRN
Status: DISCONTINUED | OUTPATIENT
Start: 2021-08-25 | End: 2021-08-27 | Stop reason: HOSPADM

## 2021-08-25 RX ORDER — ACETAMINOPHEN 650 MG/1
650 SUPPOSITORY RECTAL EVERY 4 HOURS PRN
Status: DISCONTINUED | OUTPATIENT
Start: 2021-08-25 | End: 2021-08-27 | Stop reason: HOSPADM

## 2021-08-25 RX ORDER — SODIUM POLYSTYRENE SULFONATE 15 G/60ML
15 SUSPENSION ORAL; RECTAL ONCE
Status: COMPLETED | OUTPATIENT
Start: 2021-08-25 | End: 2021-08-25

## 2021-08-25 RX ORDER — SODIUM POLYSTYRENE SULFONATE 15 G/60ML
30 SUSPENSION ORAL; RECTAL ONCE
Status: DISCONTINUED | OUTPATIENT
Start: 2021-08-25 | End: 2021-08-25

## 2021-08-25 RX ORDER — SODIUM POLYSTYRENE SULFONATE 15 G/60ML
30 SUSPENSION ORAL; RECTAL ONCE
Status: COMPLETED | OUTPATIENT
Start: 2021-08-25 | End: 2021-08-25

## 2021-08-25 RX ORDER — DEXTROSE MONOHYDRATE 25 G/50ML
25 INJECTION, SOLUTION INTRAVENOUS ONCE
Status: DISCONTINUED | OUTPATIENT
Start: 2021-08-25 | End: 2021-08-27 | Stop reason: HOSPADM

## 2021-08-25 RX ORDER — TAMSULOSIN HYDROCHLORIDE 0.4 MG/1
0.4 CAPSULE ORAL NIGHTLY
Status: DISCONTINUED | OUTPATIENT
Start: 2021-08-25 | End: 2021-08-27 | Stop reason: HOSPADM

## 2021-08-25 RX ORDER — METOPROLOL SUCCINATE 25 MG/1
12.5 TABLET, EXTENDED RELEASE ORAL DAILY
Status: DISCONTINUED | OUTPATIENT
Start: 2021-08-25 | End: 2021-08-27 | Stop reason: HOSPADM

## 2021-08-25 RX ORDER — SODIUM CHLORIDE, SODIUM LACTATE, POTASSIUM CHLORIDE, CALCIUM CHLORIDE 600; 310; 30; 20 MG/100ML; MG/100ML; MG/100ML; MG/100ML
75 INJECTION, SOLUTION INTRAVENOUS CONTINUOUS
Status: DISCONTINUED | OUTPATIENT
Start: 2021-08-25 | End: 2021-08-26

## 2021-08-25 RX ORDER — ACETAMINOPHEN 325 MG/1
650 TABLET ORAL EVERY 4 HOURS PRN
Status: DISCONTINUED | OUTPATIENT
Start: 2021-08-25 | End: 2021-08-27 | Stop reason: HOSPADM

## 2021-08-25 RX ADMIN — CALCIUM GLUCONATE 1 G: 98 INJECTION, SOLUTION INTRAVENOUS at 12:56

## 2021-08-25 RX ADMIN — SODIUM BICARBONATE 50 MEQ: 84 INJECTION INTRAVENOUS at 20:52

## 2021-08-25 RX ADMIN — SODIUM POLYSTYRENE SULFONATE 15 G: 15 SUSPENSION ORAL; RECTAL at 12:58

## 2021-08-25 RX ADMIN — SODIUM CHLORIDE, PRESERVATIVE FREE 10 ML: 5 INJECTION INTRAVENOUS at 20:57

## 2021-08-25 RX ADMIN — SODIUM CHLORIDE, POTASSIUM CHLORIDE, SODIUM LACTATE AND CALCIUM CHLORIDE 75 ML/HR: 600; 310; 30; 20 INJECTION, SOLUTION INTRAVENOUS at 17:19

## 2021-08-25 RX ADMIN — INSULIN HUMAN 10 UNITS: 100 INJECTION, SOLUTION PARENTERAL at 20:51

## 2021-08-25 RX ADMIN — INSULIN HUMAN 10 UNITS: 100 INJECTION, SOLUTION PARENTERAL at 12:58

## 2021-08-25 RX ADMIN — SODIUM BICARBONATE 650 MG: 650 TABLET ORAL at 20:52

## 2021-08-25 RX ADMIN — CALCIUM GLUCONATE 1 G: 98 INJECTION, SOLUTION INTRAVENOUS at 20:52

## 2021-08-25 RX ADMIN — PANTOPRAZOLE SODIUM 40 MG: 40 TABLET, DELAYED RELEASE ORAL at 17:18

## 2021-08-25 RX ADMIN — DEXTROSE MONOHYDRATE 50 ML: 500 INJECTION PARENTERAL at 12:56

## 2021-08-25 RX ADMIN — SODIUM POLYSTYRENE SULFONATE 30 G: 15 SUSPENSION ORAL; RECTAL at 20:52

## 2021-08-25 RX ADMIN — DEXTROSE MONOHYDRATE 50 ML: 500 INJECTION PARENTERAL at 20:51

## 2021-08-25 RX ADMIN — SODIUM CHLORIDE 1 G: 9 INJECTION, SOLUTION INTRAVENOUS at 12:56

## 2021-08-25 RX ADMIN — TAMSULOSIN HYDROCHLORIDE 0.4 MG: 0.4 CAPSULE ORAL at 20:52

## 2021-08-25 RX ADMIN — AZITHROMYCIN MONOHYDRATE 500 MG: 500 INJECTION, POWDER, LYOPHILIZED, FOR SOLUTION INTRAVENOUS at 12:57

## 2021-08-25 RX ADMIN — METOPROLOL SUCCINATE 12.5 MG: 25 TABLET, EXTENDED RELEASE ORAL at 17:18

## 2021-08-25 RX ADMIN — ALBUTEROL SULFATE 10 MG: 2.5 SOLUTION RESPIRATORY (INHALATION) at 13:49

## 2021-08-26 ENCOUNTER — APPOINTMENT (OUTPATIENT)
Dept: GENERAL RADIOLOGY | Facility: HOSPITAL | Age: 81
End: 2021-08-26

## 2021-08-26 LAB
ANION GAP SERPL CALCULATED.3IONS-SCNC: 12 MMOL/L (ref 5–15)
ANION GAP SERPL CALCULATED.3IONS-SCNC: 7 MMOL/L (ref 5–15)
ANION GAP SERPL CALCULATED.3IONS-SCNC: 7 MMOL/L (ref 5–15)
ANION GAP SERPL CALCULATED.3IONS-SCNC: 8 MMOL/L (ref 5–15)
BILIRUB UR QL STRIP: NEGATIVE
BUN SERPL-MCNC: 42 MG/DL (ref 8–23)
BUN SERPL-MCNC: 45 MG/DL (ref 8–23)
BUN SERPL-MCNC: 49 MG/DL (ref 8–23)
BUN SERPL-MCNC: 50 MG/DL (ref 8–23)
BUN/CREAT SERPL: 20.1 (ref 7–25)
BUN/CREAT SERPL: 20.8 (ref 7–25)
BUN/CREAT SERPL: 21.3 (ref 7–25)
BUN/CREAT SERPL: 21.3 (ref 7–25)
CALCIUM SPEC-SCNC: 10.2 MG/DL (ref 8.6–10.5)
CALCIUM SPEC-SCNC: 9.9 MG/DL (ref 8.6–10.5)
CHLORIDE SERPL-SCNC: 107 MMOL/L (ref 98–107)
CHLORIDE SERPL-SCNC: 108 MMOL/L (ref 98–107)
CHLORIDE SERPL-SCNC: 110 MMOL/L (ref 98–107)
CHLORIDE SERPL-SCNC: 110 MMOL/L (ref 98–107)
CLARITY UR: CLEAR
CO2 SERPL-SCNC: 22 MMOL/L (ref 22–29)
CO2 SERPL-SCNC: 22 MMOL/L (ref 22–29)
CO2 SERPL-SCNC: 23 MMOL/L (ref 22–29)
CO2 SERPL-SCNC: 23 MMOL/L (ref 22–29)
COLOR UR: YELLOW
CREAT SERPL-MCNC: 2.09 MG/DL (ref 0.76–1.27)
CREAT SERPL-MCNC: 2.16 MG/DL (ref 0.76–1.27)
CREAT SERPL-MCNC: 2.3 MG/DL (ref 0.76–1.27)
CREAT SERPL-MCNC: 2.35 MG/DL (ref 0.76–1.27)
CREAT UR-MCNC: 26.5 MG/DL
GFR SERPL CREATININE-BSD FRML MDRD: 27 ML/MIN/1.73
GFR SERPL CREATININE-BSD FRML MDRD: 28 ML/MIN/1.73
GFR SERPL CREATININE-BSD FRML MDRD: 30 ML/MIN/1.73
GFR SERPL CREATININE-BSD FRML MDRD: 31 ML/MIN/1.73
GLUCOSE SERPL-MCNC: 117 MG/DL (ref 65–99)
GLUCOSE SERPL-MCNC: 50 MG/DL (ref 65–99)
GLUCOSE SERPL-MCNC: 89 MG/DL (ref 65–99)
GLUCOSE SERPL-MCNC: 91 MG/DL (ref 65–99)
GLUCOSE UR STRIP-MCNC: NEGATIVE MG/DL
HGB UR QL STRIP.AUTO: NEGATIVE
KETONES UR QL STRIP: NEGATIVE
LEUKOCYTE ESTERASE UR QL STRIP.AUTO: NEGATIVE
NITRITE UR QL STRIP: NEGATIVE
PH UR STRIP.AUTO: 8.5 [PH] (ref 5–8)
POTASSIUM SERPL-SCNC: 5.1 MMOL/L (ref 3.5–5.2)
POTASSIUM SERPL-SCNC: 6.3 MMOL/L (ref 3.5–5.2)
PROT UR QL STRIP: NEGATIVE
PROT UR-MCNC: 14 MG/DL
QT INTERVAL: 338 MS
QT INTERVAL: 354 MS
QTC INTERVAL: 399 MS
QTC INTERVAL: 428 MS
SODIUM SERPL-SCNC: 138 MMOL/L (ref 136–145)
SODIUM SERPL-SCNC: 140 MMOL/L (ref 136–145)
SODIUM SERPL-SCNC: 140 MMOL/L (ref 136–145)
SODIUM SERPL-SCNC: 141 MMOL/L (ref 136–145)
SP GR UR STRIP: 1.01 (ref 1–1.03)
UROBILINOGEN UR QL STRIP: ABNORMAL

## 2021-08-26 PROCEDURE — 71046 X-RAY EXAM CHEST 2 VIEWS: CPT

## 2021-08-26 PROCEDURE — 80048 BASIC METABOLIC PNL TOTAL CA: CPT | Performed by: NURSE PRACTITIONER

## 2021-08-26 PROCEDURE — 99231 SBSQ HOSP IP/OBS SF/LOW 25: CPT | Performed by: THORACIC SURGERY (CARDIOTHORACIC VASCULAR SURGERY)

## 2021-08-26 PROCEDURE — 36415 COLL VENOUS BLD VENIPUNCTURE: CPT | Performed by: NURSE PRACTITIONER

## 2021-08-26 PROCEDURE — 84156 ASSAY OF PROTEIN URINE: CPT | Performed by: NURSE PRACTITIONER

## 2021-08-26 PROCEDURE — 25010000002 FUROSEMIDE PER 20 MG: Performed by: INTERNAL MEDICINE

## 2021-08-26 PROCEDURE — 71045 X-RAY EXAM CHEST 1 VIEW: CPT

## 2021-08-26 PROCEDURE — 82570 ASSAY OF URINE CREATININE: CPT | Performed by: NURSE PRACTITIONER

## 2021-08-26 PROCEDURE — 81003 URINALYSIS AUTO W/O SCOPE: CPT | Performed by: NURSE PRACTITIONER

## 2021-08-26 RX ORDER — SODIUM POLYSTYRENE SULFONATE 15 G/60ML
30 SUSPENSION ORAL; RECTAL ONCE
Status: COMPLETED | OUTPATIENT
Start: 2021-08-26 | End: 2021-08-26

## 2021-08-26 RX ORDER — SODIUM POLYSTYRENE SULFONATE 15 G/60ML
15 SUSPENSION ORAL; RECTAL ONCE
Status: COMPLETED | OUTPATIENT
Start: 2021-08-26 | End: 2021-08-26

## 2021-08-26 RX ORDER — SODIUM CHLORIDE 9 MG/ML
100 INJECTION, SOLUTION INTRAVENOUS CONTINUOUS
Status: DISCONTINUED | OUTPATIENT
Start: 2021-08-26 | End: 2021-08-27

## 2021-08-26 RX ORDER — SODIUM BICARBONATE 650 MG/1
650 TABLET ORAL 3 TIMES DAILY
Status: DISCONTINUED | OUTPATIENT
Start: 2021-08-26 | End: 2021-08-27 | Stop reason: HOSPADM

## 2021-08-26 RX ORDER — FUROSEMIDE 10 MG/ML
20 INJECTION INTRAMUSCULAR; INTRAVENOUS ONCE
Status: COMPLETED | OUTPATIENT
Start: 2021-08-26 | End: 2021-08-26

## 2021-08-26 RX ORDER — ACETAMINOPHEN 500 MG
1000 TABLET ORAL NIGHTLY
COMMUNITY

## 2021-08-26 RX ORDER — CHOLECALCIFEROL (VITAMIN D3) 125 MCG
10 CAPSULE ORAL NIGHTLY
COMMUNITY

## 2021-08-26 RX ADMIN — SODIUM BICARBONATE 650 MG: 650 TABLET ORAL at 16:28

## 2021-08-26 RX ADMIN — SODIUM CHLORIDE 100 ML/HR: 9 INJECTION, SOLUTION INTRAVENOUS at 14:37

## 2021-08-26 RX ADMIN — PANTOPRAZOLE SODIUM 40 MG: 40 TABLET, DELAYED RELEASE ORAL at 08:25

## 2021-08-26 RX ADMIN — SODIUM BICARBONATE 650 MG: 650 TABLET ORAL at 20:21

## 2021-08-26 RX ADMIN — SODIUM POLYSTYRENE SULFONATE 30 G: 15 SUSPENSION ORAL; RECTAL at 12:15

## 2021-08-26 RX ADMIN — TAMSULOSIN HYDROCHLORIDE 0.4 MG: 0.4 CAPSULE ORAL at 20:21

## 2021-08-26 RX ADMIN — SODIUM POLYSTYRENE SULFONATE 15 G: 15 SUSPENSION ORAL; RECTAL at 14:37

## 2021-08-26 RX ADMIN — SODIUM BICARBONATE 650 MG: 650 TABLET ORAL at 08:25

## 2021-08-26 RX ADMIN — SODIUM CHLORIDE, POTASSIUM CHLORIDE, SODIUM LACTATE AND CALCIUM CHLORIDE 75 ML/HR: 600; 310; 30; 20 INJECTION, SOLUTION INTRAVENOUS at 08:26

## 2021-08-26 RX ADMIN — FUROSEMIDE 20 MG: 10 INJECTION, SOLUTION INTRAVENOUS at 14:36

## 2021-08-26 RX ADMIN — METOPROLOL SUCCINATE 12.5 MG: 25 TABLET, EXTENDED RELEASE ORAL at 08:25

## 2021-08-27 ENCOUNTER — APPOINTMENT (OUTPATIENT)
Dept: ULTRASOUND IMAGING | Facility: HOSPITAL | Age: 81
End: 2021-08-27

## 2021-08-27 VITALS
HEART RATE: 87 BPM | HEIGHT: 64 IN | RESPIRATION RATE: 18 BRPM | OXYGEN SATURATION: 95 % | TEMPERATURE: 98.7 F | BODY MASS INDEX: 26.32 KG/M2 | SYSTOLIC BLOOD PRESSURE: 141 MMHG | DIASTOLIC BLOOD PRESSURE: 72 MMHG | WEIGHT: 154.2 LBS

## 2021-08-27 LAB
ANION GAP SERPL CALCULATED.3IONS-SCNC: 10 MMOL/L (ref 5–15)
ANION GAP SERPL CALCULATED.3IONS-SCNC: 6 MMOL/L (ref 5–15)
BASOPHILS # BLD AUTO: 0.03 10*3/MM3 (ref 0–0.2)
BASOPHILS NFR BLD AUTO: 0.4 % (ref 0–1.5)
BUN SERPL-MCNC: 41 MG/DL (ref 8–23)
BUN SERPL-MCNC: 42 MG/DL (ref 8–23)
BUN/CREAT SERPL: 18.6 (ref 7–25)
BUN/CREAT SERPL: 19 (ref 7–25)
CALCIUM SPEC-SCNC: 8.7 MG/DL (ref 8.6–10.5)
CALCIUM SPEC-SCNC: 9.5 MG/DL (ref 8.6–10.5)
CHLORIDE SERPL-SCNC: 107 MMOL/L (ref 98–107)
CHLORIDE SERPL-SCNC: 110 MMOL/L (ref 98–107)
CO2 SERPL-SCNC: 24 MMOL/L (ref 22–29)
CO2 SERPL-SCNC: 25 MMOL/L (ref 22–29)
CREAT SERPL-MCNC: 2.2 MG/DL (ref 0.76–1.27)
CREAT SERPL-MCNC: 2.21 MG/DL (ref 0.76–1.27)
DEPRECATED RDW RBC AUTO: 51.3 FL (ref 37–54)
EOSINOPHIL # BLD AUTO: 0.36 10*3/MM3 (ref 0–0.4)
EOSINOPHIL NFR BLD AUTO: 4.8 % (ref 0.3–6.2)
ERYTHROCYTE [DISTWIDTH] IN BLOOD BY AUTOMATED COUNT: 14.7 % (ref 12.3–15.4)
GFR SERPL CREATININE-BSD FRML MDRD: 29 ML/MIN/1.73
GFR SERPL CREATININE-BSD FRML MDRD: 29 ML/MIN/1.73
GLUCOSE SERPL-MCNC: 95 MG/DL (ref 65–99)
GLUCOSE SERPL-MCNC: 98 MG/DL (ref 65–99)
HCT VFR BLD AUTO: 37.7 % (ref 37.5–51)
HGB BLD-MCNC: 12.1 G/DL (ref 13–17.7)
IMM GRANULOCYTES # BLD AUTO: 0.04 10*3/MM3 (ref 0–0.05)
IMM GRANULOCYTES NFR BLD AUTO: 0.5 % (ref 0–0.5)
LYMPHOCYTES # BLD AUTO: 1.4 10*3/MM3 (ref 0.7–3.1)
LYMPHOCYTES NFR BLD AUTO: 18.7 % (ref 19.6–45.3)
MCH RBC QN AUTO: 30.7 PG (ref 26.6–33)
MCHC RBC AUTO-ENTMCNC: 32.1 G/DL (ref 31.5–35.7)
MCV RBC AUTO: 95.7 FL (ref 79–97)
MONOCYTES # BLD AUTO: 1.09 10*3/MM3 (ref 0.1–0.9)
MONOCYTES NFR BLD AUTO: 14.5 % (ref 5–12)
NEUTROPHILS NFR BLD AUTO: 4.58 10*3/MM3 (ref 1.7–7)
NEUTROPHILS NFR BLD AUTO: 61.1 % (ref 42.7–76)
NRBC BLD AUTO-RTO: 0 /100 WBC (ref 0–0.2)
PLATELET # BLD AUTO: 133 10*3/MM3 (ref 140–450)
PMV BLD AUTO: 11.4 FL (ref 6–12)
POTASSIUM SERPL-SCNC: 4.6 MMOL/L (ref 3.5–5.2)
POTASSIUM SERPL-SCNC: 4.9 MMOL/L (ref 3.5–5.2)
RBC # BLD AUTO: 3.94 10*6/MM3 (ref 4.14–5.8)
SODIUM SERPL-SCNC: 141 MMOL/L (ref 136–145)
SODIUM SERPL-SCNC: 141 MMOL/L (ref 136–145)
WBC # BLD AUTO: 7.5 10*3/MM3 (ref 3.4–10.8)

## 2021-08-27 PROCEDURE — 85025 COMPLETE CBC W/AUTO DIFF WBC: CPT | Performed by: INTERNAL MEDICINE

## 2021-08-27 PROCEDURE — 80048 BASIC METABOLIC PNL TOTAL CA: CPT | Performed by: NURSE PRACTITIONER

## 2021-08-27 PROCEDURE — 76775 US EXAM ABDO BACK WALL LIM: CPT

## 2021-08-27 RX ORDER — SODIUM BICARBONATE 650 MG/1
650 TABLET ORAL 3 TIMES DAILY
Qty: 90 TABLET | Refills: 0 | Status: ON HOLD | OUTPATIENT
Start: 2021-08-27 | End: 2023-01-06

## 2021-08-27 RX ADMIN — SODIUM CHLORIDE, PRESERVATIVE FREE 10 ML: 5 INJECTION INTRAVENOUS at 08:00

## 2021-08-27 RX ADMIN — SODIUM BICARBONATE 650 MG: 650 TABLET ORAL at 08:00

## 2021-08-27 RX ADMIN — ACETAMINOPHEN 650 MG: 325 TABLET, FILM COATED ORAL at 00:09

## 2021-08-27 RX ADMIN — PANTOPRAZOLE SODIUM 40 MG: 40 TABLET, DELAYED RELEASE ORAL at 08:01

## 2021-08-27 RX ADMIN — SODIUM CHLORIDE 100 ML/HR: 9 INJECTION, SOLUTION INTRAVENOUS at 01:05

## 2021-08-27 RX ADMIN — METOPROLOL SUCCINATE 12.5 MG: 25 TABLET, EXTENDED RELEASE ORAL at 08:00

## 2021-08-28 ENCOUNTER — READMISSION MANAGEMENT (OUTPATIENT)
Dept: CALL CENTER | Facility: HOSPITAL | Age: 81
End: 2021-08-28

## 2021-08-28 NOTE — OUTREACH NOTE
Prep Survey      Responses   [facility] patient discharged from?  [place]   Is LACE score < 7 ?  No   Emergency Room discharge w/ pulse ox?  No   Eligibility  Readm Mgmt   Discharge diagnosis  Hyperkalemia   Does the patient have one of the following disease processes/diagnoses(primary or secondary)?  Other   Does the patient have Home health ordered?  No   Is there a DME ordered?  No   Comments regarding appointments  Appts needed   General alerts for this patient  Lung CA, Fibrotic Lung, ARK   Prep survey completed?  Yes          Izabella Perez RN

## 2021-08-30 LAB
BACTERIA SPEC AEROBE CULT: NORMAL
BACTERIA SPEC AEROBE CULT: NORMAL

## 2021-09-01 ENCOUNTER — READMISSION MANAGEMENT (OUTPATIENT)
Dept: CALL CENTER | Facility: HOSPITAL | Age: 81
End: 2021-09-01

## 2021-09-01 NOTE — OUTREACH NOTE
Medical Week 1 Survey      Responses   [facility] patient discharged from?  [place]   Does the patient have one of the following disease processes/diagnoses(primary or secondary)?  Other   Week 1 attempt successful?  Yes   Call start time  1544   Call end time  1545   General alerts for this patient  Lung CA, Fibrotic Lung, ARK   Discharge diagnosis  Hyperkalemia   Meds reviewed with patient/caregiver?  Yes   Is the patient having any side effects they believe may be caused by any medication additions or changes?  No   Does the patient have all medications ordered at discharge?  Yes   Is the patient taking all medications as directed (includes completed medication regime)?  Yes   Does the patient have a primary care provider?   Yes   Does the patient have an appointment with their PCP within 7 days of discharge?  Greater than 7 days   Comments regarding PCP  f/u with PCP on 9/7   Nursing Interventions  Verified appointment date/time/provider   Has the patient kept scheduled appointments due by today?  N/A   Has home health visited the patient within 72 hours of discharge?  N/A   Psychosocial issues?  No   Did the patient receive a copy of their discharge instructions?  Yes   Nursing interventions  Reviewed instructions with patient   What is the patient's perception of their health status since discharge?  Improving   Is the patient/caregiver able to teach back the hierarchy of who to call/visit for symptoms/problems? PCP, Specialist, Home health nurse, Urgent Care, ED, 911  Yes   Week 1 call completed?  Yes   Wrap up additional comments  Doing well, no questions or concerns at this time.          Bridget Moe RN

## 2021-09-01 NOTE — PROGRESS NOTES
Patient:  Maryhelen Epley  YOB: 1940  Date of Service: 9/15/2021  MRN: [id number]    Primary Care Physician: Caryn Ordoñez MD    Chief Complaint   Patient presents with    Follow-up     Non-small cell lung cancer, unspecified laterality [facility])       Patient Seen, Chart, Consults notes, Labs, Radiology studies reviewed. Subjective:  Maryhelen Epley is a 80 y.o.  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 · CKD and chemotherapy associated anemia, on Procrit  · BPH on Flomax  · Orthostatic hypotension medications managed by Dr. Bhavesh Funk    He completed 4 cycles of combination chemotherapy with carboplatin, Keytruda, Alimta on 7/5/2019.    He was then changed to maintenance therapy with Keytruda and Alimta every 3 weeks. 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     · Layton Amor has benign prostatic hypertrophy (BPH)  that is stable on Flomax. · Orthostatic hypotension resolved with adjustment of metoprolol by Dr. Bhavesh Funk   · He takes Tambocor, metoprolol without new cardiac issues. · Protonix continues without any new exacerbations of GERD. · Lower extremity edema overall has actually improved to some degree. Prabhjot received his final cycle #25 of Keytruda/Alimta on 10/29/2020. Treatment was held since that time due to issues of dyspnea and shortness of breath. A course of steroids (prednisone) was initiated and antibiotics also delivered and although his respiratory issues improved, he has continued to have issues with weight loss. Monique Croft is continuing to have dyspnea to moderate exertion. Not able to get his breath completely. He uses supplemental oxygen sparingly.     Mr. Melissa Hodgson presents today, 1 year after having discontinued chemotherapy for monitoring, assessment and for further recommendations. He feels remarkably well, he still has issues of dyspnea on exertion but less so than his previous visit 3 months ago. Cardiac medications have been adjusted to reduce potential for pulmonary fibrosis. He is pleased with the way things are going. TUMOR HISTORY: Adenocarcinoma on the RUL of the lung 11/27/2018  Michelle Richardson had CT scans performed for new onset of weight loss of 13 pounds over the previous year , associated with increased fatigue. He denied respiratory symptoms of shortness of air, cough or productive sputum.     A CT scan of the chest on 9/12/2018 had been ordered by Dr. Mi Gamboa due to weight loss and identified a new lobulated right upper lobe 5.7 cm mass that extended back to the right hilum. Numerous mediastinal lymph nodes ranging in size from mm to 1.3 cm and a subcarnial lymph node that measured 1.5 x 1.5 x 3.5 cm.     A CT scan of the abdomen and pelvis with contrast on 9/12/2018 documented a 4.9 x 4 x 4 cm soft tissue mass with peripheral calcification immediately adjacent to the gallbladder in the head of the pancreas area.     An MRI of the abdomen and pelvis W & WO on 10/11/2018 identified in the 4.1 x 3.4 cm soft tissue mass in the subhepatic space, abutting the second portion of the duodenum with mild displacement of the duodenum. There does not appear to be involvement of the pancreas, and the pancreas appears within normal limits. Differential diagnoses include a desmoid tumor, less likely leiomyoma of the wall of the duodenum or malignancy. Dr. Robertha Homans requested a CT-guided biopsy of the right upper lobe mass. Dr. Mario Palma, the radiologist, evaluated the area with a repeat CT scan of the chest on 10/11/2018.    He spoke with Dr. Robertha Homans indicating that he would not be able to perform the biopsy because the tumor was not accessible, it was under the scapula , which blocked access and therefore the biopsy procedure was canceled.     On the CT scan of the chest on 10/11/2018 measurements of the RUL lung mass was 5.7 x 3.2 cm with irregular margins suspicious for a primary lung neoplasm. Soft tissue associated with the tumor appeared to extend into the bronchus supplying this portion of the RUL of the lung. Dr. Barry Carr indicated that the mass had an endobronchial component and should be easily assessable via bronchoscopy.     The chest CT also included a portion of the upper abdomen where a soft tissue mass was described in an addendum report. In the subhepatic space measuring 4.0 x 3.9 cm, displacing the second portion of the duodenum medially. A referral was made to Dr. Raul Anderson for consideration for bronchoscopy for biopsy.     On 10/24/2018, Dr. Eric Cook performed a bronchoscopy with EBUS guided biopsy of a 3 cm subcarinal lymph node along with bronchoalveolar lavage of the posterior segment of the RUL of the lung. The final pathology of the station 7 lymph node only revealed a background of small mature lymphocytes with clusters of histiocytes suggestive of granuloma. Negative for malignant cells. Kinyoun and GMS stains were negative for AFB and fungal organisms respectively. The bronchial washings were negative for malignant cells as well.      Mr. Chetan Peters was referred to Dr. Khadijah Ashley at [facility] in [place], [place], for navigational bronchoscopy and biopsy under ultrasound.     On 11/27/2018 Dr. Khadijah Ashley performed a bronchoscopy, navigational protocol, endobronchial ultrasound and biopsy of the RUL of the lung mass along with multiple lymph node station Biopsies.   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    · All lymph nodes sampled were NEGATIVE for malignant cells including stations 10L, 4L, station 7, 10R, 4R.   · IHC studies were positive for CK7, TTF-1 and Napsin A.   · IHC studies on tumor cells were negative for CDX2, CK5/6, and p63   · Further lung profile molecular testing is pending     All of the above was discussed at length with Mr. Tiffanei Hampton at his 12/13/2018 clinic visit. He was agreeable for referral for consideration of resection of the lung lesion.      He is comfortable with and would like a referral to Dr. Carissa Sanchez ([phone]) at [place] [address], [address] [place] [address], [address] [place] [address], [facility], [address] [place] [address]. Logistics, however, are planning a big part in his decision making and he may decide to have surgery done in the [place] area.     If he decides to have surgery in the [place], referral will be made to Dr. Nelson Holder.      CT chest with contrast 2/18/2019 at [facility] to 9/12/2018 revealed a 4.9 x 3.5 cm spiculated RUL lung mass which actually decreased in size from a prior value of 6.1 x 3.6 cm. Subhepatic mass adjacent to the descending duodenum had increased in size significantly to 4.3 x 9 cm compared to 4.1 x 3.4 cm on the 10/11/18 MRI of the abdomen.     CT of the abdomen and pelvis without contrast on 3/20/2019 at [facility] was compared to outpatient CT data and pelvis on 9/12/2018 an MRI of the abdomen from 10/11/2018. A soft tissue mass was again encountered in the subhepatic space which abutted the distal stomach and proximal duodenum measuring 8.9 x 5.4 cm which has increased considerably from a prior measurement of 4.1 x 3.4 cm. Medial to the left renal hilum a 3.5 cm lobular mass causing some mild left-sided hydronephrosis.     Mr. Tiffanie Hampton was referred to Dr. Nelson Holder who saw him on 1/22/2019 anticipating plans for a curative resection.      Dr. Paco Barrera received a phone call on 2/20/2019 from Dr. Carmela Alcazar , indicating that the previously documented an abdominal pancreatic area mass had doubled in size and was causing compression into the left kidney/renal pelvis producing a hydroureter.      Dr. Beck Wilkins arranged a referral to Dr. Fidel Lorenzo who will be placing a stent 3/8/2019.     Mr. Alka Hoover was scheduled to be seen by gastroenterology at [facility] on 3/13/2019 for EGD/EUS assessment and biopsy of the enlarging peripancreatic/duodenal area mass. With a decrease in the lung mass and increased size of the subhepatic masssurgical resection was abandoned at present pending further evaluation of the subhepatic mass. Dr. Lary Hunter discussed treatment options with the unresectable lung mass and the per he pancreatic mass and recommended a combination of Keytruda, Alimta, carboplatin. He was subsequently admitted to [facility] 4/26 - 4/30/2019 with abdominal pain and melena. WBC fantasma was 0.75 with ANC 0.34. PLT did drop to 24,000. He did have duodenal ulcerations that were bleeding on endoscopy. He was stabilized but then readmitted from 5/8 - 5/10/2019 with recurrent melanoma. A repeat endoscopy was performed. It was felt that exacerbation of his bleeding from the duodenal came about by his thrombocytopenia from treatment. Subsequent dosing was adjusted and Neulasta was added.     CT chest without contrast at [facility] on 6/27/2019 was compared to 2/18/2019 revealed that the right lung mass that extended into the right hilum has decreased from 5.2 x 3.5 down to 4.6 x 3.6. There is increased central cavitation in the mass consistent with tumor necrosis. Mediastinal lymphadenopathy is relatively stable.     CT abdomen and pelvis without contrast at [facility] on 6/27/2019 was compared to 4/26/2019 revealed complete resolution of the previously identified duodenal/subhepatic space soft tissue mass. The previously identified heterogenous enhancing lesion in the left renal pelvis was much less prominent but there does continue to be some mild left caliectasis. I reviewed the CT results with Dr. Lary Hunter on 7/5/2019 who then relayed them as well to [facility].  We've had excellent results from this combination of therapy. He completed the fourth combination therapy of Dennys Harrow on 7/5/2019 and then transitioned to maintenance Keytruda plus Alimta.     CT chest without contrast at [facility] on 1/9/2020 was compared to 10/17/2019 revealing:   · A stable spiculated RUL nodule/mass with similar mediastinal adenopathy. · Questionable right hilar adenopathy with diminished assessment due to lack of IV contrast.    · Advanced emphysema with severe pulmonary fibrosis. · No new pulmonary nodules.     CT abdomen and pelvis without contrast at [facility] on 1/9/2020 was compared to 10/17/2019 revealing:   · Mildly prominent aortocaval RP lymph nodes with position just below the level of the renal vein worrisome for potential lymphatic metastases. This is similar to 10/17/2019 but increased from 4/26/2019. · Pneumobilia without discrete suspicious focal lesion in the liver. · Wall thickening of the duodenum. · Similar and stable renal lesions favoring cysts.     He has had numerous endoscopies within the past year. While he was having an Endo EUS and had cardiac issues and was actually admitted to the intensive care unit. CT abdomen and pelvis without contrast on 7/16/2020 at [facility] was compared to 1/9/2020 and documented:  · 1.5 x 0.9 cm aortocaval lymph node, previously 1.9 x 1.2 cm  · No new abnormality seen    CT chest without contrast on 11/12/2020 at [facility] ws compared to 1/9/2020 and documented:  · Mixed response therapy with interval decrease in size in the RIGHT upper lobe pulmonary nodule but increase in size and mediastinal lymph nodes. · In addition, there is severe emphysema with findings of severe pulmonary fibrosis. Interval worsening of interstitial opacities bilaterally as well as suggestion of some pleural nodularity. Lymphangitic spread of malignancy should be considered.   · Small pleural effusion on the RIGHT is new     CT abdomen and pelvis without contrast on 11/12/2020 at [facility] was compared to 7/16/2020 and documented:  · The aortic caval node described on the comparison study is not significantly changed in size when measured at the same location. · Nonobstructing renal calculus on the LEFT. No change in the indeterminate renal lesions on the LEFT, likely cysts. 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     · Meliza Overall has benign prostatic hypertrophy (BPH)  that is stable on Flomax. · Orthostatic hypotension resolved with adjustment of metoprolol by Dr. Harmeet Zaldivar   · He takes Tambocor, metoprolol without new cardiac issues. · Protonix continues without any new exacerbations of GERD. · Lower extremity edema overall has actually improved to some degree. Prabhjot received his final cycle #25 of Keytruda/Alimta on 10/29/2020. Treatment was held since that time due to issues of dyspnea and shortness of breath. A course of steroids (prednisone) was initiated and antibiotics also delivered and although his respiratory issues improved, he has continued to have issues with weight loss. CXR on 12/17/2020 documented no interval change, but in my personal review of the x-ray, there is significant pulmonary fibrosis not significantly changed compared to the x-ray from October 2020. I discussed the findings on the chest x-ray and the comparison at his clinic visit on 12/30/2020. He does not appear to be improving but rather quite stable. Ubaldo Cranker has pulmonary fibrosis as documented on a CT scan of the chest without contrast at [facility] on 11/12/2020.   Meliza Lamas is in agreement to go on a chemotherapy holiday with a repeat CT scan of the chest in 3 months and monitor his situation clinically and radiographically without systemic intervention going forward. CT CHEST WO  contrast on 3/17/2021, compared to 11/12/2020 at [facility] documented:   · Mixed response therapy with interval decrease in size in the RIGHT upper lobe pulmonary nodule but increase in size and mediastinal lymph nodes. · Slight interval decrease in size in the RIGHT upper lobe nodule/mass measuring 4.7 x 2.2 cm today, previously 5.0 x 2.4 cm. RIGHT apical nodular density measuring up to 0.6 cm, previously 0.4 cm. · In addition, there is severe emphysema with findings of severe pulmonary fibrosis. Interval worsening of interstitial opacities bilaterally as well as suggestion of some pleural nodularity. Lymphangitic spread of malignancy should be considered. · Small pleural effusion on the RIGHT is new. CT ABD & PELVIS  WO contrast on 3/17/2021, compared to 11/12/2020,  at [facility] documented:  · Nonobstructing calculus lower pole left kidney  · Low-density lesions associated with the left renal contour probably proteinaceous cyst these are unchanged  · Chronic right lateral pleural complex in the lung base with bilateral small basilar pneumothoraces. .     XR CHEST (2 VW) 4/21/2021 at [facility] , compared to December 17, 2020, documented:  · Advanced interstitial fibrotic changes noted throughout the pulmonary parenchyma unchanged from December 17, 2020. · Small to moderate right lateral pleural complex. This may be pleural fluid or thickening is unchanged December 17, 2020. XR CHEST (2 VW)  6/16/2021:  · 2.5 cm area of residual nodularity versus scarring in the RUL zone. · Probable small pleural effusions. · Bilateral interstitial infiltrates stable compared to the 2 most recent studies but increased compared to the 2019 study. · There may beunderlying pulmonary fibrosis that is worsening.    · Pulmonary edema is not ruled out.        TREATMENT SUMMARY  · Keytruda, carboplatin, Alimta initiated 4/18/2019 to be given every 3 weeks for 4 cycles - 4th cycle 7/5/2019, then [address] as maintenance to continue indefinitely until progression or intolerable side effects   · Prabhjot received cycle #25 of Bridget Dendron on 10/29/2020. He was then placed on an indefinite chemotherapy holiday on 12/30/2020             #2 TUMOR HISTORY: Pancreatic mass diagnosed 10/29/03  Mr. William Hill presented in October 2003 with obstructive jaundice. A CT scan of the abdomen on 10/26/2003 documented a 3.2 x 4 x 4.2 cm mass in the head of the pancreas.      On 10/29/03 Dr. Rico Clemente performed a resection of a portion of the head of the pancreas on Gwinda Blunt along with a Mitul-en-Y procedure and a choledochojejunostomy for what was felt to be a pancreatic cancer. His pancreas was bypassed because of the findings. Pathology of the biopsies were negative for malignancy showing a fibrosed pancreas. Mr. William Hill was referred to Dr. Sonia James in [place].     Dr. Sonia James performed ERCP with an EUS and biopsy on 02/26/04   Pathology again was negative for cancer or malignancy. Routine periodic serologic and radiographic monitoring has not disclosed progression of the mass or development of new malignancy or increase in pancreatic tumor markers.      A CT scan of the abdomen and pelvis with contrast on 9/12/2018 documented a 4.9 x 4 x 4 cm soft tissue mass with peripheral calcification immediately adjacent to the gallbladder in the head of the pancreas area.     An MRI of the abdomen and pelvis W & WO on 10/11/2018 identified in the 4.1 x 3.4 cm soft tissue mass in the subhepatic space, abutting the second portion of the duodenum with mild displacement of the duodenum. There does not appear to be involvement of the pancreas, and the pancreas appears within normal limits.   Differential diagnoses include a desmoid tumor, less likely leiomyoma of the wall of the duodenum or malignancy.     CT of the abdomen and pelvis without contrast on 3/20/2019 at [facility] was compared to outpatient CT data and pelvis on 9/12/2018 an MRI of the abdomen from 10/11/2018. A soft tissue mass was again encountered in the subhepatic space which abutted the distal stomach and proximal duodenum measuring 8.9 x 5.4 cm which has increased considerably from a prior measurement of 4.1 x 3.4 cm. Medial to the left renal hilum a 3.5 cm lobular mass causing some mild left-sided hydronephrosis.     Mr. Ajay Dominguez was scheduled for an EGD/EUS by Dr. Yoana Blum as an outpatient procedure on 3/13/2019 for assessment and biopsy of the enlarging peripancreatic/duodenal area mass. Unfortunately, after initiation of the procedure, he began having ventricular tachycardia and the procedure had to be aborted. Sherry Santizo was transferred to the ICU for cardiology evaluation and stabilization. He remained hospitalized until 3/18/2019 when he was medically cleared for discharge.     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 No emergent urologic intervention was warranted and he received monitoring of renal function. He had a creatinine level of 1.9 at discharge.     PET scan on 4/2/2019 identified a soft tissue mass in the RUL measuring 1.2 x 3.5 cm with extension to the right anterior hilum with an SUV of 10.1. Evidence of mediastinal and hilar lymphadenopathy, with low-level metabolic activity. Interval increase in the size of a large mass in the right upper abdomen inseparable from the duodenum measuring 10.7 x 6.9 cm compared to 5.4 x 8.9 cm on 2/20/2019 with an SUV of 23.6. Slight increase in 2 small inseparable masses medial to the hilum of the left kidney measuring 2.2 and 2.6 cm and the other measuring 3.9 cm with a maximum SUV of 18. 6.     Cycle #1 of palliative chemotherapy with Carboplatin, Alimta and Keytruda was initiated 4/18/19 which should also cover this process.     Abdominal/pelvic CT 4/26/19 was performed at [facility] due to abdominal pain and melena. The RUQ mass, within the duodenum decreased to 6.8 x 6.2 cm (was 10.7 x 6.9 cm on PET 4/2/19)     CT abdomen and pelvis without contrast at [facility] on 6/27/2019 was compared to 4/26/2019 revealed complete resolution of the previously identified. Duodenal/subhepatic space soft tissue mass. The previously identified heterogenous enhancing lesion in the left renal pelvis was much less prominent but there does continue to be some mild left caliectasis.     CT chest without contrast at [facility] on 1/9/2020 was compared to 10/17/2019 revealing:   · A stable spiculated RUL nodule/mass with similar mediastinal adenopathy. · Questionable right hilar adenopathy with diminished assessment due to lack of IV contrast.    · Advanced emphysema with severe pulmonary fibrosis. · No new pulmonary nodules.     CT abdomen and pelvis without contrast at [facility] on 1/9/2020 was compared to 10/17/2019 revealing:   · Mildly prominent aortocaval RP lymph nodes with position just below the level of the renal vein worrisome for potential lymphatic metastases. This is similar to 10/17/2019 but increased from 4/26/2019. · Pneumobilia without discrete suspicious focal lesion in the liver. · Wall thickening of the duodenum. · Similar and stable renal lesions favoring cysts.     CT ABD & PELVIS  WO contrast on 3/17/2021, compared to 11/12/2020,  at [facility] documented:  · Nonobstructing calculus lower pole left kidney  · Low-density lesions associated with the left renal contour probably proteinaceous cyst these are unchanged  · Chronic right lateral pleural complex in the lung base with bilateral small basilar pneumothoraces. .     He has had numerous endoscopies within the past year. When he was having an Endo EUS he had cardiac issues and required to the intensive care unit. This area will just be monitored on follow-up scans without further elective endoscopic surveillance. .           #1 HEMATOLOGICAL HISTORY: IgG kappa MGUS- Dx: 02/23/06. During the course of Mr. Jim Berger follow up, an abnormally elevated protein was noted on one occasion, which led to workup including quantitative immunoglobulins.      An elevated IgG kappa was encountered. This has remained stable in the 2500 range since early 2006.      A bone marrow biopsy and aspirate on 02/23/06 was negative with only 4% plasma cells.      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      24 hour urine for immunoelectrophoresis did not reveal an M-spike. Serology studies on 9/18/2018 documented an elevated, stable IgG of 2589 with an M spike of 0.7. Immunofixation continued to reveal IgG monoclonal protein with kappa light chain specificity.       #2 HEMATOLOGICAL HISTORY: Iron deficiency anemia, 9/18/2018  Archibald Nyhan had serology on 9/18/2018 that identified iron deficiency anemia. His lab work was obtained at [facility].  An iron level was 12, iron saturation 7%, ferritin 256  Folate >20, and vitamin B12 1044. Dr. Tamera Andrew placed Sophia on ferrous sulfate 325 mg daily on 9/25/2018 , but this failed to resolve the anemia.     An EGD by Dr. Thi Berry on 12/11/2018 documented a normal esophagus, normal stomach and a degree of duodenal deformity. Gastric biopsy was urease negative.     Colonoscopy by Dr. Thi Berry on 1/9/2019 documented a polyp at 80 cm. Pathology identified a tubular adenoma, negative for high-grade dysplasia. Feraheme administered.     Labs on 3/13/2019:  · Iron 25   · Iron saturation 22%   · TIBC 116   · Ferritin >2000   · Reticulocyte count 0.0578   ·    · Haptoglobin 341   · Folate >20   · Vitamin B12 945   · Erythropoietin 13     He presented to [facility] on 4/26/2019 with melena and abdominal discomfort. He was subsequently admitted     EGD per Dr. Royer Frost on 4/29/2019 revealed  · LA grade (1 or more mucosal breaks greater than 5 mm, not extending between the tops of the 2 mucosal folds)? esophagitis with no bleeding found in the distal esophagus   · Stomach was normal, biopsies for H. pylori taken. · Cardia and gastric fundus were normal on retroflexion   · One nonbleeding cratered duodenal ulcer with no stigmata of bleeding was found in the duodenal bulb lesion was about 9 mm. · Many nonbleeding cratered, linear and superficial duodenal ulcers with no stigmata of bleeding were found in the second portion of the duodenum. The largest lesion was 6 mm in largest dimension. No mass encountered and anastomosis not seen.     He was admitted to [facility] on 5/8/2019 with melena, hematochezia, anemia, thrombocytopenia     Endoscopy performed by Dr. Hawa Brody on 5/9/2019 revealed  · Normal esophagus   · Gastric mucosal variant. 2 erythematous spots in the antrum, ? AVM   · Normal examined duodenum   · Nothing found to account for symptoms   He developed pancytopenia from chemotherapy and did require transfusions. His hemoglobin dropped to 7.3 on 6/25/2019 after his last treatment. He received 2 units packed red blood cells as an outpatient.     Serology 6/13/2019  Serum Fe - 117  TIBC - 587  Fe sat - 19.9%  Ferritin - 1,000  Iron levels have been adequately replaced. 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 TREATMENT SUMMARY  1. Feraheme 510 mg IV on 2/14 and 2/21/19   2.  Venofer 200 mg IV daily 5/8 - 5/10/2019 as IP at [facility]   3. s/p 2 units pRBC on 4/26/19 and 2 units pRBC on 4/29/2019 as IP at [facility]?  s/p 2 units pRBC on?5/8/2019   s/p 2 pheresis plts on 5/8/2019  s/p 2 units pRBC as OP 6/26/2019    Allergies:  Penicillins    Medicines:  Current Outpatient Medications   Medication Sig Dispense Refill    metoprolol succinate (TOPROL XL) 25 MG extended release tablet Take 0.5 tablets by mouth daily 45 tablet 3    sodium bicarbonate 650 MG tablet Take 650 mg by mouth 2 times daily      pantoprazole (PROTONIX) 40 MG tablet Take 40 mg by mouth daily      tamsulosin (FLOMAX) 0.4 MG capsule Take 0.4 mg by mouth daily      Multiple Vitamin (MULTI VITAMIN DAILY PO) Take by mouth daily       spironolactone (ALDACTONE) 25 MG tablet Take 25 mg by mouth daily (Patient not taking: Reported on 9/15/2021)       No current facility-administered medications for this visit. Past Medical History:      Diagnosis Date    Abnormal weight loss     Anemia     Anemia, unspecified     Blind left eye     Cardiomyopathy (HCC)     with compensated CHF    Cellulitis     Cellulitis of unspecified part of limb     Chronic kidney disease, stage IV (severe) (HCC)     Dr. Milo Smith COPD (chronic obstructive pulmonary disease) ([address]     Duodenal ulcer     Essential hypertension 10/2/2017    Eye injury     at age 12 from penetrating injury    Gout     HTN (hypertension)     Hydronephrosis     Hydronephrosis, left     Liver abscess     resolved    Lung nodule     Malignant neoplasm ([address]     Malignant neoplasm of upper lobe, right bronchus or lung (HCC)     MGUS (monoclonal gammopathy of unknown significance)     secondary to an IgG kappa.   IgG level in 2,000 range    Monoclonal gammopathy     NICM (nonischemic cardiomyopathy) ([address]     Non-small cell lung cancer ([address] 6/4/2019    Non-sustained ventricular tachycardia (HCC)     NSVT (nonsustained ventricular tachycardia) (HCC)     Osteoarthritis     Other fatigue     Other iron deficiency anemias     Secondary malignant neoplasm of other digestive organs ([address]     Weight loss         Past Surgical History:      Procedure Laterality Date    BRONCHOSCOPY  11/27/2018    dx of adenocarcinoma RUL  Dr. Luis Angel Carrasquillo CATH LAB PROCEDURE      ENDOSCOPY, COLON, DIAGNOSTIC  03/30/2019    aborted d/t vtach    EYE SURGERY Left     EYE SURGERY  1964    FOOT SURGERY      removal of joint from right toe from drilling accident, [facility] PANCREAS BIOPSY  10/29/2003    with resection  Mitul-en-Y proecedure  DR. Mcfarlane    TUNNELED VENOUS PORT PLACEMENT      UPPER GASTROINTESTINAL ENDOSCOPY N/A 3/13/2019    EGD W/EUS FNA performed by Herbert Trotter MD at [facility]        Family History:      Problem Relation Age of Onset    Osteoporosis Mother     High Blood Pressure Mother     Asthma Mother     Bleeding Prob Father     Cancer Father         leukemia    Asthma Brother         Social History  Social History     Tobacco Use    Smoking status: Former Smoker     Packs/day: 2.00     Years: 40.00     Pack years: 80.00     Types: Cigarettes     Quit date: 10/29/2003     Years since quittin.8    Smokeless tobacco: Never Used   Vaping Use    Vaping Use: Never assessed   Substance Use Topics    Alcohol use: No    Drug use: No          Objective:  Vital Signs: Blood pressure 130/80, pulse 81, height 5' 4\" (1.626 m), weight 161 lb 14.4 oz (73.4 kg), SpO2 96 %. Labs:  BMP:   No results for input(s): NA, K, CL, CO2, PHOS, BUN, CREATININE, CALCIUM in the last 72 hours. CBC:   Recent Labs     09/15/21  1405   WBC 6.01   HGB 12.8*   HCT 40.5   MCV 98.8*        LIVER PROFILE:   No results for input(s): AST, ALT, LIPASE, BILIDIR, BILITOT, ALKPHOS in the last 72 hours. Invalid input(s): AMYLASE,  ALB  PT/INR: No results for input(s): PROTIME, INR in the last 72 hours. APTT: No results for input(s): APTT in the last 72 hours. BNP:  No results for input(s): BNP in the last 72 hours. Ionized Calcium:No results for input(s): IONCA in the last 72 hours. Magnesium:No results for input(s): MG in the last 72 hours. Phosphorus:No results for input(s): PHOS in the last 72 hours. HgbA1C: No results for input(s): LABA1C in the last 72 hours. Hepatic:   No results for input(s): ALKPHOS, ALT, AST, PROT, BILITOT, BILIDIR, LABALBU in the last 72 hours. Lactic Acid: No results for input(s): LACTA in the last 72 hours.   Troponin: No results for breath completely. He uses supplemental oxygen sparingly. Mr. Alka Hoover presents today, 1 year after having discontinued chemotherapy for monitoring, assessment and for further recommendations. He feels remarkably well, he still has issues of dyspnea on exertion but less so than his previous visit 3 months ago. Cardiac medications have been adjusted to reduce potential for pulmonary fibrosis. He is pleased with the way things are going. Physical examination today, 9/15/2021:    No evidence of palpable peripheral lymphadenopathy. Chronic rales bilaterally are noted in upper and lower lung fields anteriorly and posteriorly. Heart is regular normal S1 and S2, no tachycardia. Abdominal exam is benign. Neurological exam is intact with intact mental status and nonfocal neurological exam.  Brief cranial nerves exam are all intact. CT ABD & PELVIS  WO contrast on 3/17/2021, compared to 11/12/2020,  at [facility] documented:  · Nonobstructing calculus lower pole left kidney  · Low-density lesions associated with the left renal contour probably proteinaceous cyst these are unchanged  · Chronic right lateral pleural complex in the lung base with bilateral small basilar pneumothoraces. .     XR CHEST (2 VW) 4/21/2021 at [facility] , compared to December 17, 2020, documented:  · Advanced interstitial fibrotic changes noted throughout the pulmonary parenchyma unchanged from December 17, 2020. · Small to moderate right lateral pleural complex. This may be pleural fluid or thickening is unchanged December 17, 2020. XR CHEST (2 VW)  6/16/2021:  · 2.5 cm area of residual nodularity versus scarring in the RUL zone. · Probable small pleural effusions. · Bilateral interstitial infiltrates stable compared to the 2 most recent studies but increased compared to the 2019 study. · There may beunderlying pulmonary fibrosis that is worsening. · Pulmonary edema is not ruled out.      He is clinically stable without signs to suggest new or recurrent disease. I will repeat CT scans of the chest abdomen and pelvis prior to his return. These are ordered today. I will see Mr. Cruz Yang back in follow-up in 3 months    #2   Anemia of chronic renal failure and chemotherapy associated anemia  Procrit 20,000 units weekly for Hgb < 11.0  is given as indicated to decrease transfusion requirements. CBC on 3/24/2021 reveals a WBC of  7.93. Hgb is 11.8 with an MCV of 97.3  and platelet count of 957,753 . Procrit is NOT indicated today. He will return monthly for CBC checks and Procrit as indicated for CKD associated anemia        CBC on 6/16/2021  revealed a WBC of 6.04 . Hgb is 13.0  with an MCV of 97.2 and platelet count of 734,239 . Procrit NOT indicated. CBC today 09/15/2021  reveals a WBC of 6.01 Hgb is 12.8 with an MCV of 98.8 and platelet count of 720,064. I suspect he improved performance status has something to do with his hemoglobin 12.8 causing him less dyspnea on exertion and giving him a bit more stamina. Being off of chemotherapy for a year has been very beneficial to him. Franklin Reddy MA am scribing for Adalberto Lopez MD. Electronically signed by Kecia Zeng 9/15/2021 at 6:04 PM        Anu Mata was seen today for follow-up. Diagnoses and all orders for this visit:    Non-small cell lung cancer, unspecified laterality ([facility] [address]  -     CT CHEST WO CONTRAST;  Future  -     CT ABDOMEN PELVIS WO CONTRAST Additional Contrast? None; Future    Health care maintenance    Other fatigue         Orders Placed This Encounter   Procedures    CT CHEST WO CONTRAST     Standing Status:   Future     Standing Expiration Date:   9/15/2022     Scheduling Instructions:      At [facility]     Order Specific Question:   Reason for exam:     Answer:   surveillance    CT ABDOMEN PELVIS WO CONTRAST Additional Contrast? None     Standing Status:   Future     Standing Expiration Date:   9/15/2022     Scheduling Instructions:      AT [facility] Order Specific Question:   Additional Contrast?     Answer:   None     Order Specific Question:   Reason for exam:     Answer:   Surveillance           Return in about 3 months (around 12/15/2021) for Follow Up with Edwar Kent. I, Dr. Asif Sheridan, personally performed the services described in this documentation as scribed by Deborah Kline MA in my presence, and it is both accurate and complete. Eneida Villavicencio

## 2021-09-15 ENCOUNTER — TRANSCRIBE ORDERS (OUTPATIENT)
Dept: ADMINISTRATIVE | Facility: HOSPITAL | Age: 81
End: 2021-09-15

## 2021-09-15 ENCOUNTER — OFFICE VISIT (OUTPATIENT)
Dept: HEMATOLOGY | Age: 81
End: 2021-09-15
Payer: MEDICARE

## 2021-09-15 ENCOUNTER — HOSPITAL ENCOUNTER (OUTPATIENT)
Dept: INFUSION THERAPY | Age: 81
Discharge: HOME | End: 2021-09-15
Payer: MEDICARE

## 2021-09-15 VITALS
WEIGHT: 161.9 LBS | DIASTOLIC BLOOD PRESSURE: 80 MMHG | BODY MASS INDEX: 27.64 KG/M2 | OXYGEN SATURATION: 96 % | HEIGHT: 64 IN | SYSTOLIC BLOOD PRESSURE: 130 MMHG | HEART RATE: 81 BPM

## 2021-09-15 DIAGNOSIS — C34.90 NON-SMALL CELL LUNG CANCER, UNSPECIFIED LATERALITY (HCC): ICD-10-CM

## 2021-09-15 DIAGNOSIS — Z00.00 HEALTH CARE MAINTENANCE: ICD-10-CM

## 2021-09-15 DIAGNOSIS — C34.90 NON-SMALL CELL LUNG CANCER, UNSPECIFIED LATERALITY (HCC): Primary | ICD-10-CM

## 2021-09-15 DIAGNOSIS — R53.83 OTHER FATIGUE: ICD-10-CM

## 2021-09-15 DIAGNOSIS — C34.90 MALIGNANT NEOPLASM OF BRONCHUS AND LUNG (HCC): Primary | ICD-10-CM

## 2021-09-15 LAB
BASOPHILS ABSOLUTE: 0.02 K/UL (ref 0.01–0.08)
BASOPHILS RELATIVE PERCENT: 0.3 % (ref 0.1–1.2)
EOSINOPHILS ABSOLUTE: 0.17 K/UL (ref 0.04–0.54)
EOSINOPHILS RELATIVE PERCENT: 2.8 % (ref 0.7–7)
HCT VFR BLD CALC: 40.5 % (ref 40.1–51)
HEMOGLOBIN: 12.8 G/DL (ref 13.7–17.5)
LYMPHOCYTES ABSOLUTE: 1.17 K/UL (ref 1.18–3.74)
LYMPHOCYTES RELATIVE PERCENT: 19.5 % (ref 19.3–53.1)
MCH RBC QN AUTO: 31.2 PG (ref 25.7–32.2)
MCHC RBC AUTO-ENTMCNC: 31.6 G/DL (ref 32.3–36.5)
MCV RBC AUTO: 98.8 FL (ref 79–92.2)
MONOCYTES ABSOLUTE: 0.71 K/UL (ref 0.24–0.82)
MONOCYTES RELATIVE PERCENT: 11.8 % (ref 4.7–12.5)
NEUTROPHILS ABSOLUTE: 3.94 K/UL (ref 1.56–6.13)
NEUTROPHILS RELATIVE PERCENT: 65.6 % (ref 34–71.1)
PDW BLD-RTO: 14.9 % (ref 11.6–14.4)
PLATELET # BLD: 194 K/UL (ref 163–337)
PMV BLD AUTO: 11 FL (ref 7.4–10.4)
RBC # BLD: 4.1 M/UL (ref 4.63–6.08)
WBC # BLD: 6.01 K/UL (ref 4.23–9.07)

## 2021-09-15 PROCEDURE — 99213 OFFICE O/P EST LOW 20 MIN: CPT | Performed by: INTERNAL MEDICINE

## 2021-09-15 PROCEDURE — G8417 CALC BMI ABV UP PARAM F/U: HCPCS | Performed by: INTERNAL MEDICINE

## 2021-09-15 PROCEDURE — 4040F PNEUMOC VAC/ADMIN/RCVD: CPT | Performed by: INTERNAL MEDICINE

## 2021-09-15 PROCEDURE — 85025 COMPLETE CBC W/AUTO DIFF WBC: CPT

## 2021-09-15 PROCEDURE — G8427 DOCREV CUR MEDS BY ELIG CLIN: HCPCS | Performed by: INTERNAL MEDICINE

## 2021-09-15 PROCEDURE — 1036F TOBACCO NON-USER: CPT | Performed by: INTERNAL MEDICINE

## 2021-09-15 PROCEDURE — 99213 OFFICE O/P EST LOW 20 MIN: CPT

## 2021-09-15 PROCEDURE — 1123F ACP DISCUSS/DSCN MKR DOCD: CPT | Performed by: INTERNAL MEDICINE

## 2021-09-28 ENCOUNTER — TELEPHONE (OUTPATIENT)
Dept: CARDIAC SURGERY | Facility: CLINIC | Age: 81
End: 2021-09-28

## 2021-11-08 NOTE — TELEPHONE ENCOUNTER
HISTORY OF PRESENT ILLNESS    Ramya Centeno 1987 is a 30y.o. year old female comes in today to be evaluated and treated for: pain hip    Since last appt has noticed significant improvement with piriformis and hip flexor stretches but got a little aggressive so stopped 2-3 days ago. Pain level 4/10. Using prednisone with benefit. Is doing pelvis floor therapy w/ dry needling piriformis, obturator internus, and adductors. Also right foot pain had been Dx sesamoiditis early 2020 but not much done until recetly gel pad and KT tape. IMAGING: XR lumbar 10/11/2021  No significant abnormality.      XR hips 10/11/2021  No significant abnormality.      CT abd/pelvis 7/31/2021  1. No clearly acute intra-abdominal or pelvic abnormality demonstrated.     > No abnormal bowel wall thickening or inflammatory change. Normal appendix. 2. No evidence of obstructive uropathy.      History reviewed. No pertinent surgical history. Social History     Socioeconomic History    Marital status:    Tobacco Use    Smoking status: Never Smoker    Smokeless tobacco: Never Used   Vaping Use    Vaping Use: Never used   Substance and Sexual Activity    Alcohol use: Never    Drug use: Never     Current Outpatient Medications   Medication Sig Dispense Refill    diazePAM (VALIUM) 5 mg tablet Take 1 Tablet by mouth every six (6) hours as needed for Anxiety. Max Daily Amount: 20 mg. 30 Tablet 0    predniSONE (DELTASONE) 10 mg tablet 2 tabs daily for 4 days, then 1 tab daily until gone. (Patient not taking: Reported on 11/8/2021) 12 Tablet 0    citalopram (CELEXA) 20 mg tablet Take 1 Tablet by mouth daily. (Patient not taking: Reported on 10/19/2021) 30 Tablet 1    cyclobenzaprine (FLEXERIL) 10 mg tablet Take 1 Tablet by mouth three (3) times daily as needed for Muscle Spasm(s). (Patient not taking: Reported on 10/19/2021) 30 Tablet 1    methocarbamoL (ROBAXIN) 500 mg tablet Take 1 Tablet by mouth three (3) times daily. Pt called to cx his hosp follow up appt with Dr Patel on 9-29-21.  Pt refused offer to resched stating he did not need to resched because he is doing good.  Appt cx'd as pt requested/marcelle   (Patient not taking: Reported on 10/19/2021) 40 Tablet 1    nortriptyline (PAMELOR) 50 mg capsule Take 1 Capsule by mouth nightly. 30 Capsule 1    diclofenac EC (VOLTAREN) 50 mg EC tablet Take 1 Tab by mouth two (2) times a day. (Patient not taking: Reported on 10/19/2021) 60 Tab 1     Past Medical History:   Diagnosis Date    Dyspareunia in female     History of UTI     Vulvodynia      Family History   Problem Relation Age of Onset    Cancer Maternal Grandmother     Cancer Maternal Grandfather        ROS:  No numb, tingle, incont, fever    Objective:  Pulse 96   Resp 16   Ht 5' 6\" (1.676 m)   Wt 112 lb 9.6 oz (51.1 kg)   SpO2 99%   BMI 18.17 kg/m²   NEURO:  Sensation intact to light touch. Reflexes +2/4 patellar and Achilles bilaterally. M/S:  Examined seated and supine. Slump negative. Standing flexion test positive right  Sphinx test positive left. ASIS high right  Iliac crests high right Pub[place] high right Medial malleolus high right  Sacral base posterior left  GLADYS low left  TTA at L3, 4 on left worse flexion Rib(s) no TTP and equal LE Strength +5/5 bilaterally Piriformis andd hip flexors tight TTP and bilaterally  LEFT FOOT: TTP great toe extensor left. TTP head 1st metatarsal left. Assessment/Plan:     ICD-10-CM ICD-9-CM    1. Pelvic floor dysfunction in female  M62.89 618.83 diclofenac EC (VOLTAREN) 50 mg EC tablet   2. Hip flexor tendinitis, left  M76.892 727.09 diclofenac EC (VOLTAREN) 50 mg EC tablet   3. Hip flexor tendinitis, right  M76.891 727.09 diclofenac EC (VOLTAREN) 50 mg EC tablet   4. Piriformis syndrome of both sides  G57.03 355.0 diclofenac EC (VOLTAREN) 50 mg EC tablet   5. Metatarsalgia of left foot  M77.42 726.70 diclofenac EC (VOLTAREN) 50 mg EC tablet   6. Other specified injury of muscle and tendon of long extensor muscle of toe at ankle and foot level, left foot, initial encounter  S96.192A 959.7 diclofenac EC (VOLTAREN) 50 mg EC tablet   7.  Pelvic somatic dysfunction M99.05 739.5 MI OSTEOPATHIC MANIP,3-4 BODY REGN   8. Sacral region somatic dysfunction  M99.04 739.4 MI OSTEOPATHIC MANIP,3-4 BODY REGN   9. Lumbar region somatic dysfunction  M99.03 739.3 MI OSTEOPATHIC MANIP,3-4 BODY REGN         Patient (or guardian if minor) verbalizes understanding of evaluation and plan. Verbal consent obtained. Lumbar, Pelvic and Sacral SD treated with ME. Correction of previous malalignments verified after Tx. Pt tolerated well. Notes improvement of Sx and pain is now rated 2/10. HEP/stretches daily. Discussed stretching/strengthening/posture. Will start metatarsal pad and continue PT w/ HEP for hips and consider inserts fo rover pronation and Rx for diclofenac as above and plan follow-up 6 weeks. Total time spent on encounter including chart/imaging/lab review and evaluation/documentation/demo home program/coordination of care/form completion but not including time for any procedures/manipulation 40 minutes.

## 2021-11-15 ENCOUNTER — HOSPITAL ENCOUNTER (OUTPATIENT)
Dept: CT IMAGING | Facility: HOSPITAL | Age: 81
Discharge: HOME | End: 2021-11-15
Admitting: INTERNAL MEDICINE

## 2021-11-15 DIAGNOSIS — C34.90 MALIGNANT NEOPLASM OF BRONCHUS AND LUNG (HCC): ICD-10-CM

## 2021-11-15 PROCEDURE — 25010000002 IOPAMIDOL 61 % SOLUTION: Performed by: INTERNAL MEDICINE

## 2021-11-15 PROCEDURE — 74176 CT ABD & PELVIS W/O CONTRAST: CPT

## 2021-11-15 PROCEDURE — 71250 CT THORAX DX C-: CPT

## 2021-11-15 RX ADMIN — IOPAMIDOL 50 ML: 612 INJECTION, SOLUTION INTRAVENOUS at 11:21

## 2021-12-02 ENCOUNTER — OFFICE VISIT (OUTPATIENT)
Dept: CARDIOLOGY CLINIC | Age: 81
End: 2021-12-02
Payer: MEDICARE

## 2021-12-02 VITALS
RESPIRATION RATE: 12 BRPM | SYSTOLIC BLOOD PRESSURE: 140 MMHG | HEIGHT: 64 IN | BODY MASS INDEX: 28 KG/M2 | DIASTOLIC BLOOD PRESSURE: 90 MMHG | HEART RATE: 81 BPM | WEIGHT: 164 LBS

## 2021-12-02 DIAGNOSIS — I10 ESSENTIAL HYPERTENSION: ICD-10-CM

## 2021-12-02 DIAGNOSIS — I47.29 NSVT (NONSUSTAINED VENTRICULAR TACHYCARDIA): Primary | ICD-10-CM

## 2021-12-02 DIAGNOSIS — I42.8 NONISCHEMIC CARDIOMYOPATHY (HCC): ICD-10-CM

## 2021-12-02 PROCEDURE — 93000 ELECTROCARDIOGRAM COMPLETE: CPT | Performed by: NURSE PRACTITIONER

## 2021-12-02 PROCEDURE — G8484 FLU IMMUNIZE NO ADMIN: HCPCS | Performed by: NURSE PRACTITIONER

## 2021-12-02 PROCEDURE — G8427 DOCREV CUR MEDS BY ELIG CLIN: HCPCS | Performed by: NURSE PRACTITIONER

## 2021-12-02 PROCEDURE — 99214 OFFICE O/P EST MOD 30 MIN: CPT | Performed by: NURSE PRACTITIONER

## 2021-12-02 PROCEDURE — 1123F ACP DISCUSS/DSCN MKR DOCD: CPT | Performed by: NURSE PRACTITIONER

## 2021-12-02 PROCEDURE — 4040F PNEUMOC VAC/ADMIN/RCVD: CPT | Performed by: NURSE PRACTITIONER

## 2021-12-02 PROCEDURE — 1036F TOBACCO NON-USER: CPT | Performed by: NURSE PRACTITIONER

## 2021-12-02 PROCEDURE — G8417 CALC BMI ABV UP PARAM F/U: HCPCS | Performed by: NURSE PRACTITIONER

## 2021-12-02 RX ORDER — LANOLIN ALCOHOL/MO/W.PET/CERES
10 CREAM (GRAM) TOPICAL DAILY
COMMUNITY

## 2021-12-02 RX ORDER — ACETAMINOPHEN 500 MG
500 TABLET ORAL 2 TIMES DAILY
COMMUNITY

## 2021-12-02 ASSESSMENT — ENCOUNTER SYMPTOMS
WHEEZING: 0
COUGH: 0
SORE THROAT: 0
SHORTNESS OF BREATH: 0
CHEST TIGHTNESS: 0
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
## 2021-12-13 NOTE — PROGRESS NOTES
Patient:  Alonso Rangel  YOB: 1940  Date of Service: 12/15/2021  MRN: [id number]    Primary Care Physician: Zuleima Henderson MD    Chief Complaint   Patient presents with    Follow-up     non-small cell lung cancer       Patient Seen, Chart, Consults notes, Labs, Radiology studies reviewed. Subjective:  Alonso Rangel is a 80 y.o.  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 · CKD and chemotherapy associated anemia, on Procrit  · BPH on Flomax  · Orthostatic hypotension medications managed by Dr. Jose Enrique Kingston    He completed 4 cycles of combination chemotherapy with carboplatin, Keytruda, Alimta on 7/5/2019.    He was then changed to maintenance therapy with Keytruda and Alimta every 3 weeks. 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     · Rock Rojas has benign prostatic hypertrophy (BPH)  that is stable on Flomax. · Orthostatic hypotension resolved with adjustment of metoprolol by Dr. Jose Enrique Kingston   · He takes Tambocor, metoprolol without new cardiac issues. · Protonix continues without any new exacerbations of GERD. · Lower extremity edema overall has actually improved to some degree. Prabhjot received his final cycle #25 of Keytruda/Alimta on 10/29/2020. Treatment was held since that time due to issues of dyspnea and shortness of breath. A course of steroids (prednisone) was initiated and antibiotics also delivered and although his respiratory issues improved, he has continued to have issues with weight loss. Alexandre Judd is continuing to have dyspnea to moderate exertion. Not able to get his breath completely. He uses supplemental oxygen sparingly.     Mr. Miguel Angel Tejada presents today, 1 year and 2 months after having discontinued chemotherapy for monitoring, assessment and for further recommendations. He feels remarkably well, he still has issues of dyspnea on exertion but less so than previously. Cardiac medications have been adjusted to reduce potential for pulmonary fibrosis. He is pleased with the way things are going. He has right leg discomfort but otherwise no new complaints. He has had imaging studies since his last visit and comes for review and assessment of these. TUMOR HISTORY: Adenocarcinoma on the RUL of the lung 11/27/2018  Love Guillory had CT scans performed for new onset of weight loss of 13 pounds over the previous year , associated with increased fatigue. He denied respiratory symptoms of shortness of air, cough or productive sputum.     A CT scan of the chest on 9/12/2018 had been ordered by Dr. Gee Potts due to weight loss and identified a new lobulated right upper lobe 5.7 cm mass that extended back to the right hilum. Numerous mediastinal lymph nodes ranging in size from mm to 1.3 cm and a subcarnial lymph node that measured 1.5 x 1.5 x 3.5 cm.     A CT scan of the abdomen and pelvis with contrast on 9/12/2018 documented a 4.9 x 4 x 4 cm soft tissue mass with peripheral calcification immediately adjacent to the gallbladder in the head of the pancreas area.     An MRI of the abdomen and pelvis W & WO on 10/11/2018 identified in the 4.1 x 3.4 cm soft tissue mass in the subhepatic space, abutting the second portion of the duodenum with mild displacement of the duodenum. There does not appear to be involvement of the pancreas, and the pancreas appears within normal limits. Differential diagnoses include a desmoid tumor, less likely leiomyoma of the wall of the duodenum or malignancy. Dr. Funmilayo John requested a CT-guided biopsy of the right upper lobe mass. Dr. Juice Trujillo, the radiologist, evaluated the area with a repeat CT scan of the chest on 10/11/2018.    He spoke with Dr. Funmilayo John indicating that he would not be able to perform the biopsy because the tumor was not accessible, it was under the scapula , which blocked access and therefore the biopsy procedure was canceled.     On the CT scan of the chest on 10/11/2018 measurements of the RUL lung mass was 5.7 x 3.2 cm with irregular margins suspicious for a primary lung neoplasm. Soft tissue associated with the tumor appeared to extend into the bronchus supplying this portion of the RUL of the lung. Dr. Rosemarie Peters indicated that the mass had an endobronchial component and should be easily assessable via bronchoscopy.     The chest CT also included a portion of the upper abdomen where a soft tissue mass was described in an addendum report. In the subhepatic space measuring 4.0 x 3.9 cm, displacing the second portion of the duodenum medially. A referral was made to Dr. Jaclyn Johnson for consideration for bronchoscopy for biopsy.     On 10/24/2018, Dr. Clari Mccormack performed a bronchoscopy with EBUS guided biopsy of a 3 cm subcarinal lymph node along with bronchoalveolar lavage of the posterior segment of the RUL of the lung. The final pathology of the station 7 lymph node only revealed a background of small mature lymphocytes with clusters of histiocytes suggestive of granuloma. Negative for malignant cells. Kinyoun and GMS stains were negative for AFB and fungal organisms respectively. The bronchial washings were negative for malignant cells as well.      Mr. Marah Styles was referred to Dr. Criselda Bamberger at [facility] in [place], [place], for navigational bronchoscopy and biopsy under ultrasound.     On 11/27/2018 Dr. Criselda Bamberger performed a bronchoscopy, navigational protocol, endobronchial ultrasound and biopsy of the RUL of the lung mass along with multiple lymph node station Biopsies.   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 · All lymph nodes sampled were NEGATIVE for malignant cells including stations 10L, 4L, station 7, 10R, 4R.   · IHC studies were positive for CK7, TTF-1 and Napsin A.   · IHC studies on tumor cells were negative for CDX2, CK5/6, and p63   · Further lung profile molecular testing is pending     All of the above was discussed at length with Mr. Jaqueline Hull at his 12/13/2018 clinic visit. He was agreeable for referral for consideration of resection of the lung lesion.      He is comfortable with and would like a referral to Dr. Jeanie Reilly ([phone]) at [place] [address], [address] [place] [address], [address] [place] [address]. Logistics, however, are planning a big part in his decision making and he may decide to have surgery done in the [place] area.     If he decides to have surgery in the [place], referral will be made to Dr. Kristine Smyth.      CT chest with contrast 2/18/2019 at [facility] to 9/12/2018 revealed a 4.9 x 3.5 cm spiculated RUL lung mass which actually decreased in size from a prior value of 6.1 x 3.6 cm. Subhepatic mass adjacent to the descending duodenum had increased in size significantly to 4.3 x 9 cm compared to 4.1 x 3.4 cm on the 10/11/18 MRI of the abdomen.     CT of the abdomen and pelvis without contrast on 3/20/2019 at [facility] was compared to outpatient CT data and pelvis on 9/12/2018 an MRI of the abdomen from 10/11/2018. A soft tissue mass was again encountered in the subhepatic space which abutted the distal stomach and proximal duodenum measuring 8.9 x 5.4 cm which has increased considerably from a prior measurement of 4.1 x 3.4 cm. Medial to the left renal hilum a 3.5 cm lobular mass causing some mild left-sided hydronephrosis.     Mr. Jaqueline Hull was referred to Dr. Kristine Smyth who saw him on 1/22/2019 anticipating plans for a curative resection.      Dr. Slime Jimenez received a phone call on 2/20/2019 from Dr. Rosibel Alcantara , indicating that the previously documented an abdominal pancreatic area mass had doubled in size and was causing compression into the left kidney/renal pelvis producing a hydroureter.      Dr. Erwin [place] arranged a referral to Dr. Liat Elkins who will be placing a stent 3/8/2019.     Mr. Mabel Akins was scheduled to be seen by gastroenterology at [facility] on 3/13/2019 for EGD/EUS assessment and biopsy of the enlarging peripancreatic/duodenal area mass. With a decrease in the lung mass and increased size of the subhepatic masssurgical resection was abandoned at present pending further evaluation of the subhepatic mass. Dr. Ky Givens discussed treatment options with the unresectable lung mass and the per he pancreatic mass and recommended a combination of Keytruda, Alimta, carboplatin. He was subsequently admitted to [facility] 4/26 - 4/30/2019 with abdominal pain and melena. WBC fantasma was 0.75 with ANC 0.34. PLT did drop to 24,000. He did have duodenal ulcerations that were bleeding on endoscopy. He was stabilized but then readmitted from 5/8 - 5/10/2019 with recurrent melanoma. A repeat endoscopy was performed. It was felt that exacerbation of his bleeding from the duodenal came about by his thrombocytopenia from treatment. Subsequent dosing was adjusted and Neulasta was added.     CT chest without contrast at [facility] on 6/27/2019 was compared to 2/18/2019 revealed that the right lung mass that extended into the right hilum has decreased from 5.2 x 3.5 down to 4.6 x 3.6. There is increased central cavitation in the mass consistent with tumor necrosis. Mediastinal lymphadenopathy is relatively stable.     CT abdomen and pelvis without contrast at [facility] on 6/27/2019 was compared to 4/26/2019 revealed complete resolution of the previously identified duodenal/subhepatic space soft tissue mass. The previously identified heterogenous enhancing lesion in the left renal pelvis was much less prominent but there does continue to be some mild left caliectasis.     I reviewed the CT results with Dr. Lavelle Araujo on 7/5/2019 who then relayed them as well to [facility]. We've had excellent results from this combination of therapy. He completed the fourth combination therapy of Jerris Piles on 7/5/2019 and then transitioned to maintenance Keytruda plus Alimta.     CT chest without contrast at [facility] on 1/9/2020 was compared to 10/17/2019 revealing:   · A stable spiculated RUL nodule/mass with similar mediastinal adenopathy. · Questionable right hilar adenopathy with diminished assessment due to lack of IV contrast.    · Advanced emphysema with severe pulmonary fibrosis. · No new pulmonary nodules.     CT abdomen and pelvis without contrast at [facility] on 1/9/2020 was compared to 10/17/2019 revealing:   · Mildly prominent aortocaval RP lymph nodes with position just below the level of the renal vein worrisome for potential lymphatic metastases. This is similar to 10/17/2019 but increased from 4/26/2019. · Pneumobilia without discrete suspicious focal lesion in the liver. · Wall thickening of the duodenum. · Similar and stable renal lesions favoring cysts.     He has had numerous endoscopies within the past year. While he was having an Endo EUS and had cardiac issues and was actually admitted to the intensive care unit. CT abdomen and pelvis without contrast on 7/16/2020 at [facility] was compared to 1/9/2020 and documented:  · 1.5 x 0.9 cm aortocaval lymph node, previously 1.9 x 1.2 cm  · No new abnormality seen    CT chest without contrast on 11/12/2020 at [facility] ws compared to 1/9/2020 and documented:  · Mixed response therapy with interval decrease in size in the RIGHT upper lobe pulmonary nodule but increase in size and mediastinal lymph nodes. · In addition, there is severe emphysema with findings of severe pulmonary fibrosis. Interval worsening of interstitial opacities bilaterally as well as suggestion of some pleural nodularity.  Lymphangitic spread of malignancy should be considered. · Small pleural effusion on the RIGHT is new     CT abdomen and pelvis without contrast on 11/12/2020 at \[facility]\"" was compared to 7/16/2020 and documented:  · The aortic caval node described on the comparison study is not significantly changed in size when measured at the same location. · Nonobstructing renal calculus on the LEFT. No change in the indeterminate renal lesions on the LEFT, likely cysts. 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     · Ramya Meza has benign prostatic hypertrophy (BPH)  that is stable on Flomax. · Orthostatic hypotension resolved with adjustment of metoprolol by Dr. Lorenzo Mask   · He takes Tambocor, metoprolol without new cardiac issues. · Protonix continues without any new exacerbations of GERD. · Lower extremity edema overall has actually improved to some degree. Prabhjot received his final cycle #25 of Keytruda/Alimta on 10/29/2020. Treatment was held since that time due to issues of dyspnea and shortness of breath. A course of steroids (prednisone) was initiated and antibiotics also delivered and although his respiratory issues improved, he has continued to have issues with weight loss. CXR on 12/17/2020 documented no interval change, but in my personal review of the x-ray, there is significant pulmonary fibrosis not significantly changed compared to the x-ray from October 2020. I discussed the findings on the chest x-ray and the comparison at his clinic visit on 12/30/2020. He does not appear to be improving but rather quite stable. Ophelia Burks has pulmonary fibrosis as documented on a CT scan of the chest without contrast at \[facility]\"" on 11/12/2020.   Ramya Meza is in agreement to go on a chemotherapy holiday with a repeat CT scan of the chest in 3 months and monitor his situation clinically and radiographically without systemic intervention going forward. CT CHEST WO  contrast on 3/17/2021, compared to 11/12/2020 at [facility] documented:   · Mixed response therapy with interval decrease in size in the RIGHT upper lobe pulmonary nodule but increase in size and mediastinal lymph nodes. · Slight interval decrease in size in the RIGHT upper lobe nodule/mass measuring 4.7 x 2.2 cm today, previously 5.0 x 2.4 cm. RIGHT apical nodular density measuring up to 0.6 cm, previously 0.4 cm. · In addition, there is severe emphysema with findings of severe pulmonary fibrosis. Interval worsening of interstitial opacities bilaterally as well as suggestion of some pleural nodularity. Lymphangitic spread of malignancy should be considered. · Small pleural effusion on the RIGHT is new. CT ABD & PELVIS  WO contrast on 3/17/2021, compared to 11/12/2020,  at [facility] documented:  · Nonobstructing calculus lower pole left kidney  · Low-density lesions associated with the left renal contour probably proteinaceous cyst these are unchanged  · Chronic right lateral pleural complex in the lung base with bilateral small basilar pneumothoraces. .     XR CHEST (2 VW) 4/21/2021 at [facility] , compared to December 17, 2020, documented:  · Advanced interstitial fibrotic changes noted throughout the pulmonary parenchyma unchanged from December 17, 2020. · Small to moderate right lateral pleural complex. This may be pleural fluid or thickening is unchanged December 17, 2020. XR CHEST (2 VW)  6/16/2021:  · 2.5 cm area of residual nodularity versus scarring in the RUL zone. · Probable small pleural effusions. · Bilateral interstitial infiltrates stable compared to the 2 most recent studies but increased compared to the 2019 study. · There may beunderlying pulmonary fibrosis that is worsening.    · Pulmonary edema is not ruled out.      CT CHEST WO CONTRAST DIAGNOSTIC at [facility]- 11/15/2021:Comparison: CT chest without contrast 8/25/2021  · 3.0 x 2.2 cm spiculated mass in the right upper lobe, stable  · There is pleural thickening lateral to the mass with bands of probable fibrosis, stable   · 4 mm RUL there is a small stellate shaped nodule, previously 5 mm  · 3 mm nodule in the right lower lobe posterior to the major fissure, unchanged  · There is a calcified granuloma in the medial basal segment left lower lobe   · Small calcified pleural plaques in the upper left hemithorax as before. · There are several partially calcified mediastinal lymph nodes which appears stable  · There is increased pleural thickening along the anterior margin of the right lower lobe. CT ABDOMEN PELVIS WO CONTRAST at [facility]- 11/15/2021:Comparison: CT abdomen pelvis without contrast 3/17/2021  · Review of lung windows demonstrates extensive reticular interstitial fibrosis in the lower lung zones, as well as loculated pleural fluid in the right lateral lung base with pleural thickening   · 10 mm.hyperattenuating exophytic nodule at the inferior pole the left kidney   · 7 mm nephrolithiasis at the inferior pole the left kidney. · The prostate gland is enlarged  · There is grade 1 spondylolisthesis at L5-S1 with disc space collapse. This is stable        TREATMENT SUMMARY  · Keytruda, carboplatin, Alimta initiated 4/18/2019 to be given every 3 weeks for 4 cycles - 4th cycle 7/5/2019, then Keytruda + Alimta as maintenance to continue indefinitely until progression or intolerable side effects   · Prabhjot received cycle #25 of Cindy Shannon on 10/29/2020. He was then placed on an indefinite chemotherapy holiday on 12/30/2020         #2 TUMOR HISTORY: Pancreatic mass diagnosed 10/29/03  Mr. Stella Pan presented in October 2003 with obstructive jaundice. A CT scan of the abdomen on 10/26/2003 documented a 3.2 x 4 x 4.2 cm mass in the head of the pancreas.    On 10/29/03 Dr. Urban Harrell performed a resection of a portion of the head of the pancreas on [facility] along with a Mitul-en-Y procedure and a choledochojejunostomy for what was felt to be a pancreatic cancer. His pancreas was bypassed because of the findings. Pathology of the biopsies were negative for malignancy showing a fibrosed pancreas. Mr. Arin David was referred to Dr. Hao Smart in [place].     Dr. Hao Smart performed ERCP with an EUS and biopsy on 02/26/04   Pathology again was negative for cancer or malignancy. Routine periodic serologic and radiographic monitoring has not disclosed progression of the mass or development of new malignancy or increase in pancreatic tumor markers.      A CT scan of the abdomen and pelvis with contrast on 9/12/2018 documented a 4.9 x 4 x 4 cm soft tissue mass with peripheral calcification immediately adjacent to the gallbladder in the head of the pancreas area.     An MRI of the abdomen and pelvis W & WO on 10/11/2018 identified in the 4.1 x 3.4 cm soft tissue mass in the subhepatic space, abutting the second portion of the duodenum with mild displacement of the duodenum. There does not appear to be involvement of the pancreas, and the pancreas appears within normal limits. Differential diagnoses include a desmoid tumor, less likely leiomyoma of the wall of the duodenum or malignancy.     CT of the abdomen and pelvis without contrast on 3/20/2019 at [facility] was compared to outpatient CT data and pelvis on 9/12/2018 an MRI of the abdomen from 10/11/2018. A soft tissue mass was again encountered in the subhepatic space which abutted the distal stomach and proximal duodenum measuring 8.9 x 5.4 cm which has increased considerably from a prior measurement of 4.1 x 3.4 cm. Medial to the left renal hilum a 3.5 cm lobular mass causing some mild left-sided hydronephrosis.     Mr. Arin David was scheduled for an EGD/EUS by Dr. Noa Bowman as an outpatient procedure on 3/13/2019 for assessment and biopsy of the enlarging peripancreatic/duodenal area mass. Unfortunately, after initiation of the procedure, he began having ventricular tachycardia and the procedure had to be aborted. Hilton Glass was transferred to the ICU for cardiology evaluation and stabilization. He remained hospitalized until 3/18/2019 when he was medically cleared for discharge.     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 No emergent urologic intervention was warranted and he received monitoring of renal function. He had a creatinine level of 1.9 at discharge.     PET scan on 4/2/2019 identified a soft tissue mass in the RUL measuring 1.2 x 3.5 cm with extension to the right anterior hilum with an SUV of 10.1. Evidence of mediastinal and hilar lymphadenopathy, with low-level metabolic activity. Interval increase in the size of a large mass in the right upper abdomen inseparable from the duodenum measuring 10.7 x 6.9 cm compared to 5.4 x 8.9 cm on 2/20/2019 with an SUV of 23.6. Slight increase in 2 small inseparable masses medial to the hilum of the left kidney measuring 2.2 and 2.6 cm and the other measuring 3.9 cm with a maximum SUV of 18. 6.     Cycle #1 of palliative chemotherapy with Carboplatin, Alimta and Keytruda was initiated 4/18/19 which should also cover this process.     Abdominal/pelvic CT 4/26/19 was performed at [facility] due to abdominal pain and melena. The RUQ mass, within the duodenum decreased to 6.8 x 6.2 cm (was 10.7 x 6.9 cm on PET 4/2/19)     CT abdomen and pelvis without contrast at [facility] on 6/27/2019 was compared to 4/26/2019 revealed complete resolution of the previously identified. Duodenal/subhepatic space soft tissue mass.  The previously identified heterogenous enhancing lesion in the left renal pelvis was much less prominent but there does continue to be some mild left caliectasis.     CT chest without contrast at [facility] on 1/9/2020 was compared to 10/17/2019 revealing:   · A stable spiculated RUL nodule/mass with similar mediastinal adenopathy. · Questionable right hilar adenopathy with diminished assessment due to lack of IV contrast.    · Advanced emphysema with severe pulmonary fibrosis. · No new pulmonary nodules.     CT abdomen and pelvis without contrast at [facility] on 1/9/2020 was compared to 10/17/2019 revealing:   · Mildly prominent aortocaval RP lymph nodes with position just below the level of the renal vein worrisome for potential lymphatic metastases. This is similar to 10/17/2019 but increased from 4/26/2019. · Pneumobilia without discrete suspicious focal lesion in the liver. · Wall thickening of the duodenum. · Similar and stable renal lesions favoring cysts.     CT ABD & PELVIS  WO contrast on 3/17/2021, compared to 11/12/2020,  at [facility] documented:  · Nonobstructing calculus lower pole left kidney  · Low-density lesions associated with the left renal contour probably proteinaceous cyst these are unchanged  · Chronic right lateral pleural complex in the lung base with bilateral small basilar pneumothoraces. .     He has had numerous endoscopies within the past year. When he was having an Endo EUS he had cardiac issues and required to the intensive care unit. This area will just be monitored on follow-up scans without further elective endoscopic surveillance. .                     #1 HEMATOLOGICAL HISTORY: IgG kappa MGUS- Dx: 02/23/06. During the course of Mr. Perez Brine follow up, an abnormally elevated protein was noted on one occasion, which led to workup including quantitative immunoglobulins.      An elevated IgG kappa was encountered.  This has remained stable in the 2500 range since early 2006.      A bone marrow biopsy and aspirate on 02/23/06 was negative with only 4% plasma cells.      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      24 hour urine for immunoelectrophoresis did not reveal an M-spike. Serology studies on 9/18/2018 documented an elevated, stable IgG of 2589 with an M spike of 0.7. Immunofixation continued to reveal IgG monoclonal protein with kappa light chain specificity.       #2 HEMATOLOGICAL HISTORY: Iron deficiency anemia, 9/18/2018  Rose Yoon had serology on 9/18/2018 that identified iron deficiency anemia. His lab work was obtained at [facility].  An iron level was 12, iron saturation 7%, ferritin 256  Folate >20, and vitamin B12 1044. Dr. Ronen Crump placed Sophia on ferrous sulfate 325 mg daily on 9/25/2018 , but this failed to resolve the anemia.     An EGD by Dr. Qi Nichole on 12/11/2018 documented a normal esophagus, normal stomach and a degree of duodenal deformity. Gastric biopsy was urease negative.     Colonoscopy by Dr. Qi Nichole on 1/9/2019 documented a polyp at 80 cm. Pathology identified a tubular adenoma, negative for high-grade dysplasia. Feraheme administered.     Labs on 3/13/2019:  · Iron 25   · Iron saturation 22%   · TIBC 116   · Ferritin >2000   · Reticulocyte count 0.0578   ·    · Haptoglobin 341   · Folate >20   · Vitamin B12 945   · Erythropoietin 13     He [facility] to [facility] on 4/26/2019 with melena and abdominal discomfort. He was subsequently admitted     EGD per Dr. Khadijah Hutchinson on 4/29/2019 revealed  · LA grade (1 or more mucosal breaks greater than 5 mm, not extending between the tops of the 2 mucosal folds)? esophagitis with no bleeding found in the distal esophagus   · Stomach was normal, biopsies for H. pylori taken. · Cardia and gastric fundus were normal on retroflexion   · One nonbleeding cratered duodenal ulcer with no stigmata of bleeding was found in the duodenal bulb lesion was about 9 mm. · Many nonbleeding cratered, linear and superficial duodenal ulcers with no stigmata of bleeding were found in the second portion of the duodenum. The largest lesion was 6 mm in largest dimension.  No mass encountered and anastomosis not seen.     He was admitted to [facility] on 5/8/2019 with melena, hematochezia, anemia, thrombocytopenia     Endoscopy performed by Dr. Elizabeth Humphrey on 5/9/2019 revealed  · Normal esophagus   · Gastric mucosal variant. 2 erythematous spots in the antrum, ? AVM   · Normal examined duodenum   · Nothing found to account for symptoms   He developed pancytopenia from chemotherapy and did require transfusions. His hemoglobin dropped to 7.3 on 6/25/2019 after his last treatment. He received 2 units packed red blood cells as an outpatient.     Serology 6/13/2019  Serum Fe - 117  TIBC - 587  Fe sat - 19.9%  Ferritin - 1,000  Iron levels have been adequately replaced. 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 TREATMENT SUMMARY  1. Feraheme 510 mg IV on 2/14 and 2/21/19   2. Venofer 200 mg IV daily 5/8 - 5/10/2019 as IP at [facility]   3. s/p 2 units pRBC on 4/26/19 and 2 units pRBC on 4/29/2019 as IP at [facility]?  s/p 2 units pRBC on?5/8/2019   s/p 2 pheresis plts on 5/8/2019  s/p 2 units pRBC as OP 6/26/2019    Allergies:  Penicillins    Medicines:  Current Outpatient Medications   Medication Sig Dispense Refill    losartan (COZAAR) 50 MG tablet Take 50 mg by mouth daily      melatonin 3 MG TABS tablet Take 10 mg by mouth daily      acetaminophen (TYLENOL) 500 MG tablet Take 500 mg by mouth 2 times daily      metoprolol succinate (TOPROL XL) 25 MG extended release tablet Take 0.5 tablets by mouth daily 45 tablet 3    sodium bicarbonate 650 MG tablet Take 650 mg by mouth 2 times daily      pantoprazole (PROTONIX) 40 MG tablet Take 40 mg by mouth daily      tamsulosin (FLOMAX) 0.4 MG capsule Take 0.4 mg by mouth daily      Multiple Vitamin (MULTI VITAMIN DAILY PO) Take by mouth daily        No current facility-administered medications for this visit.        Past Medical History:      Diagnosis Date    Abnormal weight loss     Anemia     Anemia, unspecified     Blind left eye     Cardiomyopathy ([address]     with compensated CHF    Cellulitis     Cellulitis of unspecified part of limb     Chronic kidney disease, stage IV (severe) (HCC)     Dr. Easton Lai COPD (chronic obstructive pulmonary disease) ([address]     Duodenal ulcer     Essential hypertension 10/2/2017    Eye injury     at age 12 from penetrating injury    Gout     HTN (hypertension)     Hydronephrosis     Hydronephrosis, left     Liver abscess     resolved    Lung nodule     Malignant neoplasm ([address]     Malignant neoplasm of upper lobe, right bronchus or lung (HCC)     MGUS (monoclonal gammopathy of unknown significance)     secondary to an IgG kappa. IgG level in 2,000 range    Monoclonal gammopathy     NICM (nonischemic cardiomyopathy) ([address]     Non-small cell lung cancer ([address] 6/4/2019    Non-sustained ventricular tachycardia (HCC)     NSVT (nonsustained ventricular tachycardia) (HCC)     Osteoarthritis     Other fatigue     Other iron deficiency anemias     Secondary malignant neoplasm of other digestive organs ([address]     Weight loss         Past Surgical History:      Procedure Laterality Date    BRONCHOSCOPY  11/27/2018    dx of adenocarcinoma RUL  Dr. Shawna Judd CATH LAB PROCEDURE      ENDOSCOPY, COLON, DIAGNOSTIC  03/30/2019    aborted d/t vtach    EYE SURGERY Left     EYE SURGERY  1964    FOOT SURGERY      removal of joint from right toe from drilling accident, [facility]  10/29/2003    with resection  Mitul-en-Y proecedure  DR. Mcfarlane    TUNNELED VENOUS PORT PLACEMENT      UPPER GASTROINTESTINAL ENDOSCOPY N/A 3/13/2019    EGD W/EUS FNA performed by Cady Baez MD at [address] [place] [address] Endoscopy        Family History:      Problem Relation Age of Onset    Osteoporosis Mother     High Blood Pressure Mother     Asthma Mother     Bleeding for pulmonary fibrosis. He is pleased with the way things are going. He has right leg discomfort but otherwise no new complaints. He has had imaging studies since his last visit and comes for review and assessment of these. Physical examination today, 12/15/2021:    No evidence of palpable peripheral lymphadenopathy. Chronic rales bilaterally are noted in upper and lower lung fields anteriorly and posteriorly. Heart is regular normal S1 and S2, no tachycardia. Abdominal exam is benign. Neurological exam is intact with intact mental status and nonfocal neurological exam.  Brief cranial nerves exam are all intact. CT CHEST WO CONTRAST DIAGNOSTIC at [facility]- 11/15/2021:Comparison: CT chest without contrast 8/25/2021  · 3.0 x 2.2 cm spiculated mass in the right upper lobe, stable  · There is pleural thickening lateral to the mass with bands of probable fibrosis, stable   · 4 mm RUL there is a small stellate shaped nodule, previously 5 mm  · 3 mm nodule in the right lower lobe posterior to the major fissure, unchanged  · There is a calcified granuloma in the medial basal segment left lower lobe   · Small calcified pleural plaques in the upper left hemithorax as before. · There are several partially calcified mediastinal lymph nodes which appears stable  · There is increased pleural thickening along the anterior margin of the right lower lobe. CT ABDOMEN PELVIS WO CONTRAST at [facility]- 11/15/2021:Comparison: CT abdomen pelvis without contrast 3/17/2021  · Review of lung windows demonstrates extensive reticular interstitial fibrosis in the lower lung zones, as well as loculated pleural fluid in the right lateral lung base with pleural thickening   · 10 mm.hyperattenuating exophytic nodule at the inferior pole the left kidney   · 7 mm nephrolithiasis at the inferior pole the left kidney. · The prostate gland is enlarged  · There is grade 1 spondylolisthesis at L5-S1 with disc space collapse.  This is stable    His review of systems is primarily significant for dyspnea on exertion. He uses oxygen periodically on a PRN basis. His review of systems and examination is otherwise unremarkable at this time. I will see Mr. Dalton Ac back in follow-up in 3 months with a chest x-ray prior to his return    #2   Anemia of chronic renal failure and chemotherapy associated anemia    Procrit 20,000 units weekly for Hgb < 11.0  is given as indicated to decrease transfusion requirements. CBC on 3/24/2021 reveals a WBC of  7.93. Hgb is 11.8 with an MCV of 97.3  and platelet count of 648,784 . Procrit is NOT indicated today. He will return monthly for CBC checks and Procrit as indicated for CKD associated anemia        CBC on 6/16/2021  revealed a WBC of 6.04 . Hgb is 13.0  with an MCV of 97.2 and platelet count of 788,276 . Procrit NOT indicated. CBC 9/15/2021  revealed a WBC of 6.01 Hgb is 12.8 with an MCV of 98.8 and platelet count of 714,247. CBC today 12/15/2021  reveals a WBC of 5.70  Hgb is 14.9 with an MCV of 93.5 and platelet count of 468,858 . I suspect he improved performance status has something to do with his hemoglobin 12.8 causing him less dyspnea on exertion and giving him a bit more stamina. Being off of chemotherapy for a year has been very beneficial to him. #3  TUMOR SCREENING AND HEALTH MAINTENANCE    GI cancer screening  Colonoscopy on 1/13/2019 by Dr. Alexx Irizarry. Recall in 5 years    Prostate cancer screening        #4  Immunization  Prabhjot received his COVID 19 vaccine on 4/5/2021 and 5/3/2021        Charlie HINSON MA am pre-charting and updating notes for Louise Wagner MD as his Medical Assistant. Electronically signed by Charlie Low MA on 12/15/2021 at 12:23 PM        Froy Moran was seen today for follow-up. Diagnoses and all orders for this visit:    Non-small cell lung cancer, unspecified laterality ([address]  -     XR CHEST STANDARD (2 VW);  Future    Chronic kidney disease, stage IV (severe) (HCC)    Anemia, chronic renal failure, stage 3 (moderate) ([facility]    Health care maintenance         Orders Placed This Encounter   Procedures    XR CHEST STANDARD (2 VW)     Standing Status:   Future     Standing Expiration Date:   12/15/2022     Order Specific Question:   Reason for exam:     Answer:   monitoring lung cancer           Return in about 3 months (around 3/15/2022) for follow-up with . Guerrero Manley

## 2021-12-15 ENCOUNTER — HOSPITAL ENCOUNTER (OUTPATIENT)
Dept: INFUSION THERAPY | Age: 81
Discharge: HOME | End: 2021-12-15
Payer: MEDICARE

## 2021-12-15 ENCOUNTER — OFFICE VISIT (OUTPATIENT)
Dept: HEMATOLOGY | Age: 81
End: 2021-12-15
Payer: MEDICARE

## 2021-12-15 VITALS
HEART RATE: 83 BPM | WEIGHT: 171.4 LBS | HEIGHT: 64 IN | BODY MASS INDEX: 29.26 KG/M2 | OXYGEN SATURATION: 92 % | DIASTOLIC BLOOD PRESSURE: 92 MMHG | SYSTOLIC BLOOD PRESSURE: 148 MMHG

## 2021-12-15 DIAGNOSIS — D63.1 ANEMIA, CHRONIC RENAL FAILURE, STAGE 3 (MODERATE) (HCC): ICD-10-CM

## 2021-12-15 DIAGNOSIS — N18.4 CHRONIC KIDNEY DISEASE, STAGE IV (SEVERE) (HCC): ICD-10-CM

## 2021-12-15 DIAGNOSIS — C34.90 NON-SMALL CELL LUNG CANCER, UNSPECIFIED LATERALITY (HCC): ICD-10-CM

## 2021-12-15 DIAGNOSIS — Z00.00 HEALTH CARE MAINTENANCE: ICD-10-CM

## 2021-12-15 DIAGNOSIS — N18.30 ANEMIA, CHRONIC RENAL FAILURE, STAGE 3 (MODERATE) (HCC): ICD-10-CM

## 2021-12-15 DIAGNOSIS — C34.90 NON-SMALL CELL LUNG CANCER, UNSPECIFIED LATERALITY (HCC): Primary | ICD-10-CM

## 2021-12-15 LAB
BASOPHILS ABSOLUTE: 0.02 K/UL (ref 0.01–0.08)
BASOPHILS RELATIVE PERCENT: 0.4 % (ref 0.1–1.2)
EOSINOPHILS ABSOLUTE: 0.11 K/UL (ref 0.04–0.54)
EOSINOPHILS RELATIVE PERCENT: 1.9 % (ref 0.7–7)
HCT VFR BLD CALC: 45.9 % (ref 40.1–51)
HEMOGLOBIN: 14.9 G/DL (ref 13.7–17.5)
LYMPHOCYTES ABSOLUTE: 0.79 K/UL (ref 1.18–3.74)
LYMPHOCYTES RELATIVE PERCENT: 13.9 % (ref 19.3–53.1)
MCH RBC QN AUTO: 30.3 PG (ref 25.7–32.2)
MCHC RBC AUTO-ENTMCNC: 32.5 G/DL (ref 32.3–36.5)
MCV RBC AUTO: 93.5 FL (ref 79–92.2)
MONOCYTES ABSOLUTE: 0.63 K/UL (ref 0.24–0.82)
MONOCYTES RELATIVE PERCENT: 11.1 % (ref 4.7–12.5)
NEUTROPHILS ABSOLUTE: 4.15 K/UL (ref 1.56–6.13)
NEUTROPHILS RELATIVE PERCENT: 72.7 % (ref 34–71.1)
PDW BLD-RTO: 14.8 % (ref 11.6–14.4)
PLATELET # BLD: 125 K/UL (ref 163–337)
PMV BLD AUTO: 11.5 FL (ref 7.4–10.4)
RBC # BLD: 4.91 M/UL (ref 4.63–6.08)
WBC # BLD: 5.7 K/UL (ref 4.23–9.07)

## 2021-12-15 PROCEDURE — 1123F ACP DISCUSS/DSCN MKR DOCD: CPT | Performed by: INTERNAL MEDICINE

## 2021-12-15 PROCEDURE — 4040F PNEUMOC VAC/ADMIN/RCVD: CPT | Performed by: INTERNAL MEDICINE

## 2021-12-15 PROCEDURE — 99212 OFFICE O/P EST SF 10 MIN: CPT

## 2021-12-15 PROCEDURE — 85025 COMPLETE CBC W/AUTO DIFF WBC: CPT

## 2021-12-15 PROCEDURE — G8484 FLU IMMUNIZE NO ADMIN: HCPCS | Performed by: INTERNAL MEDICINE

## 2021-12-15 PROCEDURE — 1036F TOBACCO NON-USER: CPT | Performed by: INTERNAL MEDICINE

## 2021-12-15 PROCEDURE — G8427 DOCREV CUR MEDS BY ELIG CLIN: HCPCS | Performed by: INTERNAL MEDICINE

## 2021-12-15 PROCEDURE — 99213 OFFICE O/P EST LOW 20 MIN: CPT | Performed by: INTERNAL MEDICINE

## 2021-12-15 PROCEDURE — G8417 CALC BMI ABV UP PARAM F/U: HCPCS | Performed by: INTERNAL MEDICINE

## 2021-12-15 RX ORDER — LOSARTAN POTASSIUM 50 MG/1
50 TABLET ORAL DAILY
COMMUNITY
Start: 2021-12-09

## 2021-12-17 ENCOUNTER — OFFICE VISIT (OUTPATIENT)
Dept: CARDIOLOGY CLINIC | Age: 81
End: 2021-12-17
Payer: MEDICARE

## 2021-12-17 VITALS
OXYGEN SATURATION: 93 % | WEIGHT: 171 LBS | BODY MASS INDEX: 29.19 KG/M2 | HEIGHT: 64 IN | DIASTOLIC BLOOD PRESSURE: 80 MMHG | SYSTOLIC BLOOD PRESSURE: 118 MMHG | HEART RATE: 90 BPM

## 2021-12-17 DIAGNOSIS — I42.8 NONISCHEMIC CARDIOMYOPATHY (HCC): ICD-10-CM

## 2021-12-17 DIAGNOSIS — I10 ESSENTIAL HYPERTENSION: Primary | ICD-10-CM

## 2021-12-17 PROCEDURE — G8484 FLU IMMUNIZE NO ADMIN: HCPCS | Performed by: NURSE PRACTITIONER

## 2021-12-17 PROCEDURE — 1123F ACP DISCUSS/DSCN MKR DOCD: CPT | Performed by: NURSE PRACTITIONER

## 2021-12-17 PROCEDURE — 4040F PNEUMOC VAC/ADMIN/RCVD: CPT | Performed by: NURSE PRACTITIONER

## 2021-12-17 PROCEDURE — 1036F TOBACCO NON-USER: CPT | Performed by: NURSE PRACTITIONER

## 2021-12-17 PROCEDURE — 99214 OFFICE O/P EST MOD 30 MIN: CPT | Performed by: NURSE PRACTITIONER

## 2021-12-17 PROCEDURE — G8427 DOCREV CUR MEDS BY ELIG CLIN: HCPCS | Performed by: NURSE PRACTITIONER

## 2021-12-17 PROCEDURE — G8417 CALC BMI ABV UP PARAM F/U: HCPCS | Performed by: NURSE PRACTITIONER

## 2021-12-17 ASSESSMENT — ENCOUNTER SYMPTOMS
CHEST TIGHTNESS: 0
WHEEZING: 0
COUGH: 0
SHORTNESS OF BREATH: 0
SORE THROAT: 0

## 2021-12-17 NOTE — PROGRESS NOTES
[facility]   Established Patient Office Visit   [facility]. [address] [facility]  [phone]        OFFICE VISIT:  2021    Kenneth Melara - : 1940    Reason For Visit:  Froy Moran is a 80 y.o. male who is here for Follow-up    1. Essential hypertension    2. 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 Patient was last seen in our office on 2021 with an elevated blood pressure. Patient was given digital blood pressure cuff and was instructed to keep home blood pressure logs. Patient was started on losartan 50 mg by his kidney doctor. In addition to being on the Toprol-XL 12.5 mg daily. Patient presents to clinic today for blood pressure evaluation. Patient denies any complaints.     Subjective    Kenneth Melara is a 80 y.o. male with the following history as recorded in [facility]:    Patient Active Problem List    Diagnosis Date Noted    Hypoxia 2020    Fatigue associated with anemia 2020    Symptomatic anemia 2020    Monoclonal gammopathy     Other iron deficiency anemias     Chronic kidney disease, stage IV (severe) (HCC)     Anemia, unspecified     Malignant neoplasm (HCC)     Secondary malignant neoplasm of other digestive organs (HCC)     Abnormal weight loss     Other fatigue     Cellulitis of unspecified part of limb     Non-sustained ventricular tachycardia (HCC)     Gout     Duodenal ulcer     Malignant neoplasm of upper lobe, right bronchus or lung (HCC)      Non-small cell lung cancer ([address] 2019    Pneumonia due to infectious organism 2019    Cellulitis of right upper limb 2019    Stage 3 chronic kidney disease ([address] 2019    Severe protein-calorie malnutrition ([address] 2019    Normocytic anemia 03/18/2019    Hydronephrosis, left     Right upper quadrant abdominal mass     Essential hypertension 10/02/2017    Cardiomyopathy, nonischemic ([facility] 10/02/2017    NSVT (nonsustained ventricular tachycardia) ([facility]) 10/02/2017     Current Outpatient Medications   Medication Sig Dispense Refill    losartan (COZAAR) 50 MG tablet Take 50 mg by mouth daily      melatonin 3 MG TABS tablet Take 10 mg by mouth daily      acetaminophen (TYLENOL) 500 MG tablet Take 500 mg by mouth 2 times daily      metoprolol succinate (TOPROL XL) 25 MG extended release tablet Take 0.5 tablets by mouth daily 45 tablet 3    sodium bicarbonate 650 MG tablet Take 650 mg by mouth 2 times daily      pantoprazole (PROTONIX) 40 MG tablet Take 40 mg by mouth daily      tamsulosin (FLOMAX) 0.4 MG capsule Take 0.4 mg by mouth daily      Multiple Vitamin (MULTI VITAMIN DAILY PO) Take by mouth daily        No current facility-administered medications for this visit. Allergies: Penicillins  Past Medical History:   Diagnosis Date    Abnormal weight loss     Anemia     Anemia, unspecified     Blind left eye     Cardiomyopathy ([address]     with compensated CHF    Cellulitis     Cellulitis of unspecified part of limb     Chronic kidney disease, stage IV (severe) ([facility])     Dr. Edelmira Bernstein COPD (chronic obstructive pulmonary disease) ([facility]     Duodenal ulcer     Essential hypertension 10/2/2017    Eye injury     at age 12 from penetrating injury    Gout     HTN (hypertension)     Hydronephrosis     Hydronephrosis, left     Liver abscess     resolved    Lung nodule     Malignant neoplasm ([address]     Malignant neoplasm of upper lobe, right bronchus or lung ([facility])     MGUS (monoclonal gammopathy of unknown significance)     secondary to an IgG kappa.   IgG level in 2,000 range    Monoclonal gammopathy     NICM (nonischemic cardiomyopathy) ([address]     Non-small cell lung cancer ([address] 6/4/2019    Non-sustained ventricular tachycardia [facility]     NSVT (nonsustained ventricular tachycardia) (HCC)     Osteoarthritis     Other fatigue     Other iron deficiency anemias     Secondary malignant neoplasm of other digestive organs ([facility]     Weight loss      Past Surgical History:   Procedure Laterality Date    BRONCHOSCOPY  2018    dx of adenocarcinoma RUL  Dr. Beverly Philip CATH LAB PROCEDURE      ENDOSCOPY, COLON, DIAGNOSTIC  2019    aborted d/t vtach    EYE SURGERY Left     EYE SURGERY  1964    FOOT SURGERY      removal of joint from right toe from drilling accident, [facility]  10/29/2003    with resection  Mitul-en-Y proecedure  DR. Mcfarlane    TUNNELED VENOUS PORT PLACEMENT      UPPER GASTROINTESTINAL ENDOSCOPY N/A 3/13/2019    EGD W/EUS FNA performed by Becca Anderson MD at [facility] Endoscopy     Family History   Problem Relation Age of Onset    Osteoporosis Mother     High Blood Pressure Mother     Asthma Mother     Bleeding Prob Father     Cancer Father         leukemia    Asthma Brother      Social History     Tobacco Use    Smoking status: Former Smoker     Packs/day: 2.00     Years: 40.00     Pack years: 80.00     Types: Cigarettes     Quit date: 10/29/2003     Years since quittin.1    Smokeless tobacco: Never Used   Substance Use Topics    Alcohol use: No          Review of Systems:    Review of Systems   Constitutional: Negative for activity change, chills, diaphoresis, fatigue and fever. HENT: Negative for congestion and sore throat. Respiratory: Negative for cough, chest tightness, shortness of breath and wheezing. Cardiovascular: Negative for chest pain, palpitations and leg swelling. Neurological: Negative for dizziness, syncope, light-headedness and headaches. Psychiatric/Behavioral: Negative for confusion. The patient is not nervous/anxious.          Objective:    /80   Pulse 90   Ht 5' 4\" (1.626 m)   Wt 171 lb (77.6 kg)   SpO2 93%   BMI 29.35 kg/m²     GENERAL - well developed and well nourished, conversant  HEENT -  PERRLA, Hearing appears normal, gentleman lids are normal.  External inspection of ears and nose appear normal.  NECK - no thyromegaly, no JVD, trachea is in the midline  CARDIOVASCULAR - PMI is in the mid line clavicular position, Normal S1 and S2 with no systolic murmur. No S3 or S4    PULMONARY - no respiratory distress. No wheezes or rales. Lungs are clear to ausculation, normal respiratory effort. ABDOMEN  - soft, non tender, no rebound  MUSCULOSKELETAL  - range of motion of the upper and lower extermites appears normal and equal and is without pain   EXTREMITIES - no significant edema   NEUROLOGIC - gait and station are normal  SKIN - turgor is normal, can warm and dry. PSYCHIATRIC - normal mood and affect, alert and orientated x 3,      ASSESSMENT:    ALL THE CARDIOLOGY PROBLEMS ARE LISTED ABOVE; HOWEVER, THE FOLLOWING SPECIFIC CARDIAC PROBLEMS / CONDITIONS WERE ADDRESSED AND TREATED DURING THE OFFICE VISIT TODAY:                                                                                            MEDICAL DECISION MAKING             Cardiac Specific Problem / Diagnosis  Discussion and Data Reviewed Diagnostic Procedures Ordered Management Options Selected           1. Hypertension  Well Controlled   Review and summation of old records:    Blood pressure in the office today 118/80. O2 sat 93%. Home blood pressure readings well controlled. Patient is on losartan 50 mg daily. Toprol-XL 12.5 mg daily. No Continue current medications:     Yes           2. Nonischemic cardiomyopathy  Stable   No overt signs of failure. No Continue current medications:    Yes           3. History of nonsustained ventricular tachycardia Stable  no recurrence. Patient is on Toprol-XL 12.5 mg daily. No Continue current medications:        Yes                     No orders of the defined types were placed in this encounter. No orders of the defined types were placed in this encounter. Discussed with patient. Return in about 3 months (around 3/17/2022) for Routine with me . I greatly appreciate the opportunity to meet David Fagan and your confidence in allowing me to participate in his cardiovascular care. Celso William, ALEX - NP  12/17/2021 11:29 AM CST                    This dictation was generated by voice recognition computer software. Although all attempts are made to edit dictation for accuracy, there may be errors in the transcription that are not intended.

## 2022-03-03 ENCOUNTER — HOSPITAL ENCOUNTER (OUTPATIENT)
Dept: GENERAL RADIOLOGY | Age: 82
Discharge: HOME | End: 2022-03-03
Payer: MEDICARE

## 2022-03-03 DIAGNOSIS — C34.90 NON-SMALL CELL LUNG CANCER, UNSPECIFIED LATERALITY (HCC): ICD-10-CM

## 2022-03-03 PROCEDURE — 71046 X-RAY EXAM CHEST 2 VIEWS: CPT

## 2022-03-11 NOTE — PROGRESS NOTES
Patient:  Hilda Hurley  YOB: 1940  Date of Service: 3/16/2022  MRN: [id number]    Primary Care Physician: Lizz Little MD    Chief Complaint   Patient presents with    Follow-up     NSCLC       Patient Seen, Chart, Consults notes, Labs, Radiology studies reviewed. Subjective:  Hilda Hurley is a 80 y.o.  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 · CKD and chemotherapy associated ANEMIA, previously on Procrit, currently on hold having completed chemotherapy with further improvement in hemoglobin. · BPH on Flomax  · Orthostatic hypotension medications managed by Dr. Winston Lester    He completed 4 cycles of combination chemotherapy with carboplatin, Keytruda, Alimta on 7/5/2019.    He was then changed to maintenance therapy with Keytruda and Alimta every 3 weeks. Prabhjot received his final cycle #25 of Keytruda/Alimta on 10/29/2020. 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     · Mark Li has benign prostatic hypertrophy (BPH)  that is stable on Flomax. · Orthostatic hypotension resolved with adjustment of metoprolol by Dr. Winston Lester   · He takes Tambocor, metoprolol without new cardiac issues. · Protonix continues without any new exacerbations of GERD. · Lower extremity edema overall has actually improved to some degree     Not able to get his breath completely. He uses supplemental oxygen sparingly. Serafin Cameron feels remarkably well, he still has issues of dyspnea on moderate exertion but less so than previously. He is not able to get his breath completely. He uses supplemental oxygen sparingly.     Cardiac medications have been adjusted to reduce potential for pulmonary fibrosis. He is pleased with the way things are going. He has right leg discomfort but otherwise no new complaints. Daryl Omalley had a chest x-ray on 3/3/2022 and comes for continued monitoring. TUMOR HISTORY: Adenocarcinoma on the RUL of the lung 11/27/2018  Loretta Heredia had CT scans performed for new onset of weight loss of 13 pounds over the previous year , associated with increased fatigue. He denied respiratory symptoms of shortness of air, cough or productive sputum.     A CT scan of the chest on 9/12/2018 had been ordered by Dr. Yarely Del Rosario due to weight loss and identified a new lobulated right upper lobe 5.7 cm mass that extended back to the right hilum. Numerous mediastinal lymph nodes ranging in size from mm to 1.3 cm and a subcarnial lymph node that measured 1.5 x 1.5 x 3.5 cm.     A CT scan of the abdomen and pelvis with contrast on 9/12/2018 documented a 4.9 x 4 x 4 cm soft tissue mass with peripheral calcification immediately adjacent to the gallbladder in the head of the pancreas area.     An MRI of the abdomen and pelvis W & WO on 10/11/2018 identified in the 4.1 x 3.4 cm soft tissue mass in the subhepatic space, abutting the second portion of the duodenum with mild displacement of the duodenum. There does not appear to be involvement of the pancreas, and the pancreas appears within normal limits. Differential diagnoses include a desmoid tumor, less likely leiomyoma of the wall of the duodenum or malignancy. Dr. Vijaya Zelaya requested a CT-guided biopsy of the right upper lobe mass. Dr. Edson Pritchett, the radiologist, evaluated the area with a repeat CT scan of the chest on 10/11/2018.    He spoke with Dr. Vijaya Zelaya indicating that he would not be able to perform the biopsy because the tumor was not accessible, it was under the scapula , which blocked access and therefore the biopsy procedure was canceled.     On the CT scan of the chest on 10/11/2018 measurements of the RUL lung mass was 5.7 x 3.2 cm with irregular margins suspicious for a primary lung neoplasm. Soft tissue associated with the tumor appeared to extend into the bronchus supplying this portion of the RUL of the lung. Dr. Terri Ag indicated that the mass had an endobronchial component and should be easily assessable via bronchoscopy.     The chest CT also included a portion of the upper abdomen where a soft tissue mass was described in an addendum report. In the subhepatic space measuring 4.0 x 3.9 cm, displacing the second portion of the duodenum medially. A referral was made to Dr. Rena Manning for consideration for bronchoscopy for biopsy.     On 10/24/2018, Dr. Julio Hutchinson performed a bronchoscopy with EBUS guided biopsy of a 3 cm subcarinal lymph node along with bronchoalveolar lavage of the posterior segment of the RUL of the lung. The final pathology of the station 7 lymph node only revealed a background of small mature lymphocytes with clusters of histiocytes suggestive of granuloma. Negative for malignant cells. Kinyoun and GMS stains were negative for AFB and fungal organisms respectively. The bronchial washings were negative for malignant cells as well.      Mr. Tia Stephens was referred to Dr. Zaynab Wilkerson at [facility] in [place], [place], for navigational bronchoscopy and biopsy under ultrasound.     On 11/27/2018 Dr. Zaynab Wilkerson performed a bronchoscopy, navigational protocol, endobronchial ultrasound and biopsy of the RUL of the lung mass along with multiple lymph node station Biopsies.   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    · All lymph nodes sampled were NEGATIVE for malignant cells including stations 10L, 4L, station 7, 10R, 4R.   · IHC studies were positive for CK7, TTF-1 and Napsin A.   · IHC studies on tumor cells were negative for CDX2, CK5/6, and p63   · Further lung profile molecular testing is pending     All of the above was discussed at length with Mr. Nabeel Hurd at his 12/13/2018 clinic visit. He was agreeable for referral for consideration of resection of the lung lesion.      He is comfortable with and would like a referral to Dr. Jennifer Sagastume ([phone]) at [place] [address], [address] [place] [address], [address] [place] [address]. Logistics, however, are planning a big part in his decision making and he may decide to have surgery done in the [place] area.     If he decides to have surgery in the [place], referral will be made to Dr. David Washington.      CT chest with contrast 2/18/2019 at [facility] to 9/12/2018 revealed a 4.9 x 3.5 cm spiculated RUL lung mass which actually decreased in size from a prior value of 6.1 x 3.6 cm. Subhepatic mass adjacent to the descending duodenum had increased in size significantly to 4.3 x 9 cm compared to 4.1 x 3.4 cm on the 10/11/18 MRI of the abdomen.     CT of the abdomen and pelvis without contrast on 3/20/2019 at [facility] was compared to outpatient CT data and pelvis on 9/12/2018 an MRI of the abdomen from 10/11/2018. A soft tissue mass was again encountered in the subhepatic space which abutted the distal stomach and proximal duodenum measuring 8.9 x 5.4 cm which has increased considerably from a prior measurement of 4.1 x 3.4 cm. Medial to the left renal hilum a 3.5 cm lobular mass causing some mild left-sided hydronephrosis.     Mr. Nabeel Hurd was referred to Dr. David Washington who saw him on 1/22/2019 anticipating plans for a curative resection.      Dr. Darren Clark received a phone call on 2/20/2019 from Dr. Nancy Long , indicating that the previously documented an abdominal pancreatic area mass had doubled in size and was causing compression into the left kidney/renal pelvis producing a hydroureter.      Dr. Nancy Long arranged a referral to Dr. Hema Dow who will be placing a stent 3/8/2019.     Mr. Barb Paulino was scheduled to be seen by gastroenterology at [facility] on 3/13/2019 for EGD/EUS assessment and biopsy of the enlarging peripancreatic/duodenal area mass. With a decrease in the lung mass and increased size of the subhepatic masssurgical resection was abandoned at present pending further evaluation of the subhepatic mass. Dr. Diego Tirado discussed treatment options with the unresectable lung mass and the per he pancreatic mass and recommended a combination of Keytruda, Alimta, carboplatin. He was subsequently admitted to [facility] 4/26 - 4/30/2019 with abdominal pain and melena. WBC fantasma was 0.75 with ANC 0.34. PLT did drop to 24,000. He did have duodenal ulcerations that were bleeding on endoscopy. He was stabilized but then readmitted from 5/8 - 5/10/2019 with recurrent melanoma. A repeat endoscopy was performed. It was felt that exacerbation of his bleeding from the duodenal came about by his thrombocytopenia from treatment. Subsequent dosing was adjusted and Neulasta was added.     CT chest without contrast at [facility] on 6/27/2019 was compared to 2/18/2019 revealed that the right lung mass that extended into the right hilum has decreased from 5.2 x 3.5 down to 4.6 x 3.6. There is increased central cavitation in the mass consistent with tumor necrosis. Mediastinal lymphadenopathy is relatively stable.     CT abdomen and pelvis without contrast at [facility] on 6/27/2019 was compared to 4/26/2019 revealed complete resolution of the previously identified duodenal/subhepatic space soft tissue mass. The previously identified heterogenous enhancing lesion in the left renal pelvis was much less prominent but there does continue to be some mild left caliectasis. I reviewed the CT results with Dr. Diego Tirado on 7/5/2019 who then relayed them as well to [facility]. We've had excellent results from this combination of therapy.     He completed the fourth combination therapy of Melarohith Benjamin on 7/5/2019 and then transitioned to maintenance Keytruda plus Alimta.     CT chest without contrast at [facility] on 1/9/2020 was compared to 10/17/2019 revealing:   · A stable spiculated RUL nodule/mass with similar mediastinal adenopathy. · Questionable right hilar adenopathy with diminished assessment due to lack of IV contrast.    · Advanced emphysema with severe pulmonary fibrosis. · No new pulmonary nodules.     CT abdomen and pelvis without contrast at [facility] on 1/9/2020 was compared to 10/17/2019 revealing:   · Mildly prominent aortocaval RP lymph nodes with position just below the level of the renal vein worrisome for potential lymphatic metastases. This is similar to 10/17/2019 but increased from 4/26/2019. · Pneumobilia without discrete suspicious focal lesion in the liver. · Wall thickening of the duodenum. · Similar and stable renal lesions favoring cysts.     He has had numerous endoscopies within the past year. While he was having an Endo EUS and had cardiac issues and was actually admitted to the intensive care unit. CT abdomen and pelvis without contrast on 7/16/2020 at [facility] was compared to 1/9/2020 and documented:  · 1.5 x 0.9 cm aortocaval lymph node, previously 1.9 x 1.2 cm  · No new abnormality seen    CT chest without contrast on 11/12/2020 at [facility] ws compared to 1/9/2020 and documented:  · Mixed response therapy with interval decrease in size in the RIGHT upper lobe pulmonary nodule but increase in size and mediastinal lymph nodes. · In addition, there is severe emphysema with findings of severe pulmonary fibrosis. Interval worsening of interstitial opacities bilaterally as well as suggestion of some pleural nodularity. Lymphangitic spread of malignancy should be considered.   · Small pleural effusion on the RIGHT is new     CT abdomen and pelvis without contrast on 11/12/2020 at [facility] was compared to 7/16/2020 and documented:  · The aortic caval node described on the comparison study is not significantly changed in size when measured at the same location. · Nonobstructing renal calculus on the LEFT. No change in the indeterminate renal lesions on the LEFT, likely cysts. 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     · Yamileth Garsia has benign prostatic hypertrophy (BPH)  that is stable on Flomax. · Orthostatic hypotension resolved with adjustment of metoprolol by Dr. Erwin Vazquez   · He takes Tambocor, metoprolol without new cardiac issues. · Protonix continues without any new exacerbations of GERD. · Lower extremity edema overall has actually improved to some degree. Prabhjot received his final cycle #25 of Keytruda/Alimta on 10/29/2020. Treatment was held since that time due to issues of dyspnea and shortness of breath. A course of steroids (prednisone) was initiated and antibiotics also delivered and although his respiratory issues improved, he has continued to have issues with weight loss. CXR on 12/17/2020 documented no interval change, but in my personal review of the x-ray, there is significant pulmonary fibrosis not significantly changed compared to the x-ray from October 2020. I discussed the findings on the chest x-ray and the comparison at his clinic visit on 12/30/2020. He does not appear to be improving but rather quite stable. Jason Mayberry has pulmonary fibrosis as documented on a CT scan of the chest without contrast at [facility] on 11/12/2020. Yamileth Garsia is in agreement to go on a chemotherapy holiday with a repeat CT scan of the chest in 3 months and monitor his situation clinically and radiographically without systemic intervention going forward.     CT CHEST WO  contrast on 3/17/2021, compared to 11/12/2020 at [facility] documented:   · Mixed response therapy with interval decrease in size in the RIGHT upper lobe pulmonary nodule but increase in size and mediastinal lymph nodes. · Slight interval decrease in size in the RIGHT upper lobe nodule/mass measuring 4.7 x 2.2 cm today, previously 5.0 x 2.4 cm. RIGHT apical nodular density measuring up to 0.6 cm, previously 0.4 cm. · In addition, there is severe emphysema with findings of severe pulmonary fibrosis. Interval worsening of interstitial opacities bilaterally as well as suggestion of some pleural nodularity. Lymphangitic spread of malignancy should be considered. · Small pleural effusion on the RIGHT is new. CT ABD & PELVIS  WO contrast on 3/17/2021, compared to 11/12/2020,  at [facility] documented:  · Nonobstructing calculus lower pole left kidney  · Low-density lesions associated with the left renal contour probably proteinaceous cyst these are unchanged  · Chronic right lateral pleural complex in the lung base with bilateral small basilar pneumothoraces. .     XR CHEST (2 VW) 4/21/2021 at [facility] , compared to December 17, 2020, documented:  · Advanced interstitial fibrotic changes noted throughout the pulmonary parenchyma unchanged from December 17, 2020. · Small to moderate right lateral pleural complex. This may be pleural fluid or thickening is unchanged December 17, 2020. XR CHEST (2 VW)  6/16/2021:  · 2.5 cm area of residual nodularity versus scarring in the RUL zone. · Probable small pleural effusions. · Bilateral interstitial infiltrates stable compared to the 2 most recent studies but increased compared to the 2019 study. · There may beunderlying pulmonary fibrosis that is worsening.    · Pulmonary edema is not ruled out.      CT CHEST WO CONTRAST DIAGNOSTIC at [facility]- 11/15/2021:Comparison: CT chest without contrast 8/25/2021  · 3.0 x 2.2 cm spiculated mass in the right upper lobe, stable  · There is pleural thickening lateral to the mass with bands of probable fibrosis, stable   · 4 mm RUL there is a small stellate shaped nodule, previously 5 mm  · 3 mm nodule in the right lower lobe posterior to the major fissure, unchanged  · There is a calcified granuloma in the medial basal segment left lower lobe   · Small calcified pleural plaques in the upper left hemithorax as before. · There are several partially calcified mediastinal lymph nodes which appears stable  · There is increased pleural thickening along the anterior margin of the right lower lobe. CT ABDOMEN PELVIS WO CONTRAST at [facility]- 11/15/2021:Comparison: CT abdomen pelvis without contrast 3/17/2021  · Review of lung windows demonstrates extensive reticular interstitial fibrosis in the lower lung zones, as well as loculated pleural fluid in the right lateral lung base with pleural thickening   · 10 mm.hyperattenuating exophytic nodule at the inferior pole the left kidney   · 7 mm nephrolithiasis at the inferior pole the left kidney. · The prostate gland is enlarged  · There is grade 1 spondylolisthesis at L5-S1 with disc space collapse. This is stable    XR CHEST (2 VW)  on 3/3/2022 at [facility], compared to 8/16/21, documented:  · A 1.7 cm opacity in the right midlung zone appears smaller on the current study, previously measuring 2.5 cm. · Stable blunting of the costophrenic angles right greater than left. · Coarse interstitial lung disease appears relatively stable. Probable interstitial fibrosis      TREATMENT SUMMARY  · Keytruda, carboplatin, Alimta initiated 4/18/2019 to be given every 3 weeks for 4 cycles - 4th cycle 7/5/2019, then Keytruda + Alimta as maintenance to continue indefinitely until progression or intolerable side effects   · Prabhjot received cycle #25 of Sepideh Cordero on 10/29/2020. He was then placed on an indefinite chemotherapy holiday on 12/30/2020         #2 TUMOR HISTORY: Pancreatic mass diagnosed 10/29/03  Mr. Brodie Del Rio presented in October 2003 with obstructive jaundice.    A CT scan of the abdomen on 10/26/2003 documented a 3.2 x 4 x 4.2 cm mass in the head of the pancreas.      On 10/29/03 Dr. Freeman Garsia performed a resection of a portion of the head of the pancreas on Miguel Jordan along with a Mitul-en-Y procedure and a choledochojejunostomy for what was felt to be a pancreatic cancer. His pancreas was bypassed because of the findings. Pathology of the biopsies were negative for malignancy showing a fibrosed pancreas. Mr. Barb Paulino was referred to Dr. Ernie Herron in [place].     Dr. Ernie Herron performed ERCP with an EUS and biopsy on 02/26/04   Pathology again was negative for cancer or malignancy. Routine periodic serologic and radiographic monitoring has not disclosed progression of the mass or development of new malignancy or increase in pancreatic tumor markers.      A CT scan of the abdomen and pelvis with contrast on 9/12/2018 documented a 4.9 x 4 x 4 cm soft tissue mass with peripheral calcification immediately adjacent to the gallbladder in the head of the pancreas area.     An MRI of the abdomen and pelvis W & WO on 10/11/2018 identified in the 4.1 x 3.4 cm soft tissue mass in the subhepatic space, abutting the second portion of the duodenum with mild displacement of the duodenum. There does not appear to be involvement of the pancreas, and the pancreas appears within normal limits. Differential diagnoses include a desmoid tumor, less likely leiomyoma of the wall of the duodenum or malignancy.     CT of the abdomen and pelvis without contrast on 3/20/2019 at [facility] was compared to outpatient CT data and pelvis on 9/12/2018 an MRI of the abdomen from 10/11/2018. A soft tissue mass was again encountered in the subhepatic space which abutted the distal stomach and proximal duodenum measuring 8.9 x 5.4 cm which has increased considerably from a prior measurement of 4.1 x 3.4 cm. Medial to the left renal hilum a 3.5 cm lobular mass causing some mild left-sided hydronephrosis.      Germania King was scheduled for an EGD/EUS by Dr. William Vincent as an outpatient procedure on 3/13/2019 for assessment and biopsy of the enlarging peripancreatic/duodenal area mass. Unfortunately, after initiation of the procedure, he began having ventricular tachycardia and the procedure had to be aborted. Opal Elder was transferred to the ICU for cardiology evaluation and stabilization. He remained hospitalized until 3/18/2019 when he was medically cleared for discharge.     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 No emergent urologic intervention was warranted and he received monitoring of renal function. He had a creatinine level of 1.9 at discharge.     PET scan on 4/2/2019 identified a soft tissue mass in the RUL measuring 1.2 x 3.5 cm with extension to the right anterior hilum with an SUV of 10.1. Evidence of mediastinal and hilar lymphadenopathy, with low-level metabolic activity. Interval increase in the size of a large mass in the right upper abdomen inseparable from the duodenum measuring 10.7 x 6.9 cm compared to 5.4 x 8.9 cm on 2/20/2019 with an SUV of 23.6. Slight increase in 2 small inseparable masses medial to the hilum of the left kidney measuring 2.2 and 2.6 cm and the other measuring 3.9 cm with a maximum SUV of 18. 6.     Cycle #1 of palliative chemotherapy with Carboplatin, Alimta and Keytruda was initiated 4/18/19 which should also cover this process.     Abdominal/pelvic CT 4/26/19 was performed at [facility] due to abdominal pain and melena. The RUQ mass, within the duodenum decreased to 6.8 x 6.2 cm (was 10.7 x 6.9 cm on PET 4/2/19)     CT abdomen and pelvis without contrast at [facility] on 6/27/2019 was compared to 4/26/2019 revealed complete resolution of the previously identified. Duodenal/subhepatic space soft tissue mass.  The previously identified heterogenous enhancing lesion in the left renal pelvis was much less prominent but there does continue to be some mild left caliectasis.     CT chest without contrast at [facility] on 1/9/2020 was compared to 10/17/2019 revealing:   · A stable spiculated RUL nodule/mass with similar mediastinal adenopathy. · Questionable right hilar adenopathy with diminished assessment due to lack of IV contrast.    · Advanced emphysema with severe pulmonary fibrosis. · No new pulmonary nodules.     CT abdomen and pelvis without contrast at [facility] on 1/9/2020 was compared to 10/17/2019 revealing:   · Mildly prominent aortocaval RP lymph nodes with position just below the level of the renal vein worrisome for potential lymphatic metastases. This is similar to 10/17/2019 but increased from 4/26/2019. · Pneumobilia without discrete suspicious focal lesion in the liver. · Wall thickening of the duodenum. · Similar and stable renal lesions favoring cysts.     CT ABD & PELVIS  WO contrast on 3/17/2021, compared to 11/12/2020,  at [facility] documented:  · Nonobstructing calculus lower pole left kidney  · Low-density lesions associated with the left renal contour probably proteinaceous cyst these are unchanged  · Chronic right lateral pleural complex in the lung base with bilateral small basilar pneumothoraces. .     He has had numerous endoscopies within the past year. When he was having an Endo EUS he had cardiac issues and required to the intensive care unit. This area will just be monitored on follow-up scans without further elective endoscopic surveillance. .     CT ABDOMEN PELVIS WO CONTRAST at [facility]- 11/15/2021:Comparison: CT abdomen pelvis without contrast 3/17/2021  · Review of lung windows demonstrates extensive reticular interstitial fibrosis in the lower lung zones, as well as loculated pleural fluid in the right lateral lung base with pleural thickening   · 10 mm.hyperattenuating exophytic nodule at the inferior pole the left kidney   · 7 mm nephrolithiasis at the inferior pole the left kidney. · The prostate gland is enlarged  There is grade 1 spondylolisthesis at L5-S1 with disc space collapse. This is stable             #1 HEMATOLOGICAL HISTORY: IgG kappa MGUS- Dx: 02/23/06. During the course of Mr. Linda Karimi follow up, an abnormally elevated protein was noted on one occasion, which led to workup including quantitative immunoglobulins.      An elevated IgG kappa was encountered. This has remained stable in the 2500 range since early 2006.      A bone marrow biopsy and aspirate on 02/23/06 was negative with only 4% plasma cells.      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      24 hour urine for immunoelectrophoresis did not reveal an M-spike. Serology studies on 9/18/2018 documented an elevated, stable IgG of 2589 with an M spike of 0.7. Immunofixation continued to reveal IgG monoclonal protein with kappa light chain specificity.       #2 HEMATOLOGICAL HISTORY: Iron deficiency anemia, 9/18/2018  Sobia Yi had serology on 9/18/2018 that identified iron deficiency anemia. His lab work was obtained at [facility].  An iron level was 12, iron saturation 7%, ferritin 256  Folate >20, and vitamin B12 1044. Dr. Babita Nichols placed Sophia on ferrous sulfate 325 mg daily on 9/25/2018 , but this failed to resolve the anemia.     An EGD by Dr. Chuy Brooks on 12/11/2018 documented a normal esophagus, normal stomach and a degree of duodenal deformity. Gastric biopsy was urease negative.     Colonoscopy by Dr. Chuy Brooks on 1/9/2019 documented a polyp at 80 cm. Pathology identified a tubular adenoma, negative for high-grade dysplasia. Feraheme administered.     Labs on 3/13/2019:  · Iron 25   · Iron saturation 22%   · TIBC 116   · Ferritin >2000   · Reticulocyte count 0.0578   ·    · Haptoglobin 341   · Folate >20   · Vitamin B12 945   · Erythropoietin 13     He presented to [facility] on 4/26/2019 with melena and abdominal discomfort.  He was subsequently admitted     EGD per Dr. Kris Mohr on 4/29/2019 revealed  · LA grade (1 or more mucosal breaks greater than 5 mm, not extending between the tops of the 2 mucosal folds)? esophagitis with no bleeding found in the distal esophagus   · Stomach was normal, biopsies for H. pylori taken. · Cardia and gastric fundus were normal on retroflexion   · One nonbleeding cratered duodenal ulcer with no stigmata of bleeding was found in the duodenal bulb lesion was about 9 mm. · Many nonbleeding cratered, linear and superficial duodenal ulcers with no stigmata of bleeding were found in the second portion of the duodenum. The largest lesion was 6 mm in largest dimension. No mass encountered and anastomosis not seen.     He was admitted to [facility] on 5/8/2019 with melena, hematochezia, anemia, thrombocytopenia     Endoscopy performed by Dr. Angy Bridges on 5/9/2019 revealed  · Normal esophagus   · Gastric mucosal variant. 2 erythematous spots in the antrum, ? AVM   · Normal examined duodenum   · Nothing found to account for symptoms   He developed pancytopenia from chemotherapy and did require transfusions. His hemoglobin dropped to 7.3 on 6/25/2019 after his last treatment. He received 2 units packed red blood cells as an outpatient.     Serology 6/13/2019  Serum Fe - 117  TIBC - 587  Fe sat - 19.9%  Ferritin - 1,000  Iron levels have been adequately replaced. 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 TREATMENT SUMMARY  1. Feraheme 510 mg IV on 2/14 and 2/21/19   2.  Venofer 200 mg IV daily 5/8 - 5/10/2019 as IP at [facility]   3. s/p 2 units pRBC on 4/26/19 and 2 units pRBC on 4/29/2019 as IP at [facility]?  s/p 2 units pRBC on?5/8/2019   s/p 2 pheresis plts on 5/8/2019  s/p 2 units pRBC as OP 6/26/2019    Allergies:  Penicillins    Medicines:  Current Outpatient Medications   Medication Sig Dispense Refill    losartan (COZAAR) 50 MG tablet Take 50 mg by mouth daily  melatonin 3 MG TABS tablet Take 10 mg by mouth daily      acetaminophen (TYLENOL) 500 MG tablet Take 500 mg by mouth 2 times daily      metoprolol succinate (TOPROL XL) 25 MG extended release tablet Take 0.5 tablets by mouth daily 45 tablet 3    sodium bicarbonate 650 MG tablet Take 650 mg by mouth 2 times daily      pantoprazole (PROTONIX) 40 MG tablet Take 40 mg by mouth daily      tamsulosin (FLOMAX) 0.4 MG capsule Take 0.4 mg by mouth daily      Multiple Vitamin (MULTI VITAMIN DAILY PO) Take by mouth daily        No current facility-administered medications for this visit. Facility-Administered Medications Ordered in Other Visits   Medication Dose Route Frequency Provider Last Rate Last Admin    sodium chloride flush 0.9 % injection 10 mL  10 mL IntraVENous PRN Ethan Costa MD        sodium chloride flush 0.9 % injection 20 mL  20 mL IntraVENous PRN Ethan Costa MD   20 mL at 03/16/22 1144    heparin flush 100 UNIT/ML injection 500 Units  500 Units IntraCATHeter PRN Ethan Costa MD   500 Units at 03/16/22 1145       Past Medical History:      Diagnosis Date    Abnormal weight loss     Anemia     Anemia, unspecified     Blind left eye     Cardiomyopathy ([address]     with compensated CHF    Cellulitis     Cellulitis of unspecified part of limb     Chronic kidney disease, stage IV (severe) ([address]     Dr. Jesika Killian COPD (chronic obstructive pulmonary disease) ([address]     Duodenal ulcer     Encounter for central line care 3/16/2022    Essential hypertension 10/2/2017    Eye injury     at age 12 from penetrating injury    Gout     HTN (hypertension)     Hydronephrosis     Hydronephrosis, left     Liver abscess     resolved    Lung nodule     Malignant neoplasm ([address]     Malignant neoplasm of upper lobe, right bronchus or lung ([address]     MGUS (monoclonal gammopathy of unknown significance)     secondary to an IgG kappa.   IgG level in 2,000 range    Monoclonal gammopathy  [facility]M [facility]nonischemic cardiomyopathy) [facility]     Non-small cell lung cancer ([facility] 2019    Non-sustained ventricular tachycardia (HCC)     NSVT (nonsustained ventricular tachycardia) (HCC)     Osteoarthritis     Other fatigue     Other iron deficiency anemias     Secondary malignant neoplasm of other digestive organs ([facility]     Weight loss         Past Surgical History:      Procedure Laterality Date    BRONCHOSCOPY  2018    dx of adenocarcinoma RUL  Dr. Tonie Lakhani CATH LAB PROCEDURE      ENDOSCOPY, COLON, DIAGNOSTIC  2019    aborted d/t vtach    EYE SURGERY Left     EYE SURGERY  1964    FOOT SURGERY      removal of joint from right toe from drilling accident, [facility]  10/29/2003    with resection  Mitul-en-Y proecedure  DR. Mcfarlane    TUNNELED VENOUS PORT PLACEMENT      UPPER GASTROINTESTINAL ENDOSCOPY N/A 3/13/2019    EGD W/EUS FNA performed by Zuhair Jett MD at [facility]        Family History:      Problem Relation Age of Onset    Osteoporosis Mother     High Blood Pressure Mother     Asthma Mother     Bleeding Prob Father     Cancer Father         leukemia    Asthma Brother         Social History  Social History     Tobacco Use    Smoking status: Former Smoker     Packs/day: 2.00     Years: 40.00     Pack years: 80.00     Types: Cigarettes     Quit date: 10/29/2003     Years since quittin.3    Smokeless tobacco: Never Used   Vaping Use    Vaping Use: Not on file   Substance Use Topics    Alcohol use: No    Drug use: No          Objective:  Vital Signs: Blood pressure 138/82, pulse 89, height 5' 4\" (1.626 m), weight 178 lb (80.7 kg), SpO2 93 %. Labs:  BMP:   No results for input(s): NA, K, CL, CO2, PHOS, BUN, CREATININE, CALCIUM in the last 72 hours.   CBC:   Recent Labs     22  1116   WBC 5.79   HGB 13.7   HCT 42.8   MCV 94.5*   *     LIVER PROFILE: No results for input(s): AST, ALT, LIPASE, BILIDIR, BILITOT, ALKPHOS in the last 72 hours. Invalid input(s): AMYLASE,  ALB  PT/INR: No results for input(s): PROTIME, INR in the last 72 hours. APTT: No results for input(s): APTT in the last 72 hours. BNP:  No results for input(s): BNP in the last 72 hours. Ionized Calcium:No results for input(s): IONCA in the last 72 hours. Magnesium:No results for input(s): MG in the last 72 hours. Phosphorus:No results for input(s): PHOS in the last 72 hours. HgbA1C: No results for input(s): LABA1C in the last 72 hours. Hepatic:   No results for input(s): ALKPHOS, ALT, AST, PROT, BILITOT, BILIDIR, LABALBU in the last 72 hours. Lactic Acid: No results for input(s): LACTA in the last 72 hours. Troponin: No results for input(s): CKTOTAL, CKMB, TROPONINT in the last 72 hours. ABGs: No results for input(s): PH, PCO2, PO2, HCO3, O2SAT in the last 72 hours. CRP:  No results for input(s): CRP in the last 72 hours. Sed Rate:  No results for input(s): SEDRATE in the last 72 hours. Cultures:   No results for input(s): CULTURE in the last 72 hours. Radiology reports as per the Radiologist  Radiology: No results found. ASSESSMENT AND PLAN:  #1.      Hilda Hurley is a 80 y.o.  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 · CKD and chemotherapy associated ANEMIA, previously on Procrit, currently on hold having completed chemotherapy with further improvement in hemoglobin. · BPH on Flomax  · Orthostatic hypotension medications managed by Dr. Winston Lester    He completed 4 cycles of combination chemotherapy with carboplatin, Keytruda, Alimta on 7/5/2019.    He was then changed to maintenance therapy with Keytruda and Alimta every 3 weeks. Prabhjot received his final cycle #25 of Keytruda/Alimta on 10/29/2020.   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     · Chuck Garcia has benign prostatic hypertrophy (BPH)  that is stable on Flomax. · Orthostatic hypotension resolved with adjustment of metoprolol by Dr. Nilam Escoto   · He takes Tambocor, metoprolol without new cardiac issues. · Protonix continues without any new exacerbations of GERD. · Lower extremity edema overall has actually improved to some degree     Not able to get his breath completely. He uses supplemental oxygen sparingly. Helen Hamman feels remarkably well, he still has issues of dyspnea on moderate exertion but less so than previously. He is not able to get his breath completely. He uses supplemental oxygen sparingly. Cardiac medications have been adjusted to reduce potential for pulmonary fibrosis. He is pleased with the way things are going. He has right leg discomfort but otherwise no new complaints. Helen Hamman had a chest x-ray on 3/3/2022 and comes for continued monitoring. Physical examination today, 3/16/2022:    No evidence of palpable peripheral lymphadenopathy. Chronic rales bilaterally are noted in upper and lower lung fields anteriorly and posteriorly. Heart is regular normal S1 and S2, no tachycardia. Abdominal exam is benign. Neurological exam is intact with intact mental status and nonfocal neurological exam.  Brief cranial nerves exam are all intact. CBC today 3/16/2022 reveals a WBC of 5.79 Hgb is 13.7 with an MCV of  94.5 and platelet count of 402,744.     CT CHEST WO CONTRAST DIAGNOSTIC at [facility]- 11/15/2021:Comparison: CT chest without contrast 8/25/2021  · 3.0 x 2.2 cm spiculated mass in the right upper lobe, stable  · There is pleural thickening lateral to the mass with bands of probable fibrosis, stable   · 4 mm RUL there is a small stellate shaped nodule, previously 5 mm  · 3 mm nodule in the right lower lobe posterior to the major fissure, unchanged  · There is a calcified granuloma in the medial basal segment left lower lobe   · Small calcified pleural plaques in the upper left hemithorax as before. · There are several partially calcified mediastinal lymph nodes which appears stable  · There is increased pleural thickening along the anterior margin of the right lower lobe. CT ABDOMEN PELVIS WO CONTRAST at [facility]- 11/15/2021:Comparison: CT abdomen pelvis without contrast 3/17/2021  · Review of lung windows demonstrates extensive reticular interstitial fibrosis in the lower lung zones, as well as loculated pleural fluid in the right lateral lung base with pleural thickening   · 10 mm.hyperattenuating exophytic nodule at the inferior pole the left kidney   · 7 mm nephrolithiasis at the inferior pole the left kidney. · The prostate gland is enlarged  · There is grade 1 spondylolisthesis at L5-S1 with disc space collapse. This is stable      XR CHEST (2 VW)  on 3/3/2022 at [address], compared to 8/16/21, documented:  · A 1.7 cm opacity in the right midlung zone appears smaller on the current study, previously measuring 2.5 cm. · Stable blunting of the costophrenic angles right greater than left. · Coarse interstitial lung disease appears relatively stable. Probable interstitial fibrosis    Examination is completely unremarkable for new problems. He continues to use oxygen however sparingly and only in the evenings. He is now driving himself back and forth to the doctor's office and wherever he needs to go. I will see Mr. Jack Rueda back in follow-up in 3 months with a chest x-ray prior to his return    #2   Anemia of chronic renal failure and chemotherapy associated anemia    Procrit 20,000 units weekly for Hgb < 11.0  is given as indicated to decrease transfusion requirements.         CBC on 3/24/2021 revealed a WBC of Procedures    XR CHEST STANDARD (2 VW)     Standing Status:   Future     Standing Expiration Date:   3/16/2023     Order Specific Question:   Reason for exam:     Answer:   monitor lung nodule; hx lung cancer    CT CHEST WO CONTRAST     Standing Status:   Future     Standing Expiration Date:   3/16/2023     Order Specific Question:   Reason for exam:     Answer:   lung nodule, hx lung cancer    CT ABDOMEN PELVIS WO CONTRAST Additional Contrast? Oral     Standing Status:   Future     Standing Expiration Date:   3/16/2023     Order Specific Question:   Additional Contrast?     Answer:   Oral     Order Specific Question:   Reason for exam:     Answer:   lung nodule, hx lung cancer           Return in about 3 months (around 6/16/2022) for Follow Up with Denise Divers, . Kandis Fofana

## 2022-03-14 DIAGNOSIS — C34.90 NON-SMALL CELL LUNG CANCER, UNSPECIFIED LATERALITY (HCC): Primary | ICD-10-CM

## 2022-03-16 ENCOUNTER — TRANSCRIBE ORDERS (OUTPATIENT)
Dept: ADMINISTRATIVE | Facility: HOSPITAL | Age: 82
End: 2022-03-16

## 2022-03-16 ENCOUNTER — OFFICE VISIT (OUTPATIENT)
Dept: HEMATOLOGY | Age: 82
End: 2022-03-16
Payer: MEDICARE

## 2022-03-16 ENCOUNTER — HOSPITAL ENCOUNTER (OUTPATIENT)
Dept: INFUSION THERAPY | Age: 82
Discharge: HOME | End: 2022-03-16
Payer: MEDICARE

## 2022-03-16 VITALS
HEART RATE: 89 BPM | OXYGEN SATURATION: 93 % | DIASTOLIC BLOOD PRESSURE: 82 MMHG | HEIGHT: 64 IN | BODY MASS INDEX: 30.39 KG/M2 | SYSTOLIC BLOOD PRESSURE: 138 MMHG | WEIGHT: 178 LBS

## 2022-03-16 DIAGNOSIS — C34.90 NON-SMALL CELL LUNG CANCER, UNSPECIFIED LATERALITY (HCC): Primary | ICD-10-CM

## 2022-03-16 DIAGNOSIS — R59.1 LYMPHADENOPATHY: ICD-10-CM

## 2022-03-16 DIAGNOSIS — R91.1 PULMONARY NODULE: ICD-10-CM

## 2022-03-16 DIAGNOSIS — Z45.2 ENCOUNTER FOR CENTRAL LINE CARE: Primary | ICD-10-CM

## 2022-03-16 DIAGNOSIS — C34.90 NON-SMALL CELL LUNG CANCER, UNSPECIFIED LATERALITY (HCC): ICD-10-CM

## 2022-03-16 DIAGNOSIS — C34.90 MALIGNANT NEOPLASM OF BRONCHUS AND LUNG: Primary | ICD-10-CM

## 2022-03-16 DIAGNOSIS — N18.4 CHRONIC KIDNEY DISEASE, STAGE IV (SEVERE): ICD-10-CM

## 2022-03-16 DIAGNOSIS — Z00.00 HEALTH CARE MAINTENANCE: ICD-10-CM

## 2022-03-16 DIAGNOSIS — R53.83 OTHER FATIGUE: ICD-10-CM

## 2022-03-16 DIAGNOSIS — N18.4 CHRONIC KIDNEY DISEASE, STAGE IV (SEVERE) (HCC): ICD-10-CM

## 2022-03-16 DIAGNOSIS — R91.1 LUNG NODULE: ICD-10-CM

## 2022-03-16 LAB
BASOPHILS ABSOLUTE: 0.01 K/UL (ref 0.01–0.08)
BASOPHILS RELATIVE PERCENT: 0.2 % (ref 0.1–1.2)
EOSINOPHILS ABSOLUTE: 0.14 K/UL (ref 0.04–0.54)
EOSINOPHILS RELATIVE PERCENT: 2.4 % (ref 0.7–7)
HCT VFR BLD CALC: 42.8 % (ref 40.1–51)
HEMOGLOBIN: 13.7 G/DL (ref 13.7–17.5)
LYMPHOCYTES ABSOLUTE: 1.02 K/UL (ref 1.18–3.74)
LYMPHOCYTES RELATIVE PERCENT: 17.6 % (ref 19.3–53.1)
MCH RBC QN AUTO: 30.2 PG (ref 25.7–32.2)
MCHC RBC AUTO-ENTMCNC: 32 G/DL (ref 32.3–36.5)
MCV RBC AUTO: 94.5 FL (ref 79–92.2)
MONOCYTES ABSOLUTE: 0.61 K/UL (ref 0.24–0.82)
MONOCYTES RELATIVE PERCENT: 10.5 % (ref 4.7–12.5)
NEUTROPHILS ABSOLUTE: 4.01 K/UL (ref 1.56–6.13)
NEUTROPHILS RELATIVE PERCENT: 69.3 % (ref 34–71.1)
PDW BLD-RTO: 15.1 % (ref 11.6–14.4)
PLATELET # BLD: 135 K/UL (ref 163–337)
PMV BLD AUTO: 11 FL (ref 7.4–10.4)
RBC # BLD: 4.53 M/UL (ref 4.63–6.08)
WBC # BLD: 5.79 K/UL (ref 4.23–9.07)

## 2022-03-16 PROCEDURE — 36415 COLL VENOUS BLD VENIPUNCTURE: CPT

## 2022-03-16 PROCEDURE — 6360000002 HC RX W HCPCS: Performed by: INTERNAL MEDICINE

## 2022-03-16 PROCEDURE — 1036F TOBACCO NON-USER: CPT | Performed by: INTERNAL MEDICINE

## 2022-03-16 PROCEDURE — 99214 OFFICE O/P EST MOD 30 MIN: CPT | Performed by: INTERNAL MEDICINE

## 2022-03-16 PROCEDURE — G8484 FLU IMMUNIZE NO ADMIN: HCPCS | Performed by: INTERNAL MEDICINE

## 2022-03-16 PROCEDURE — 4040F PNEUMOC VAC/ADMIN/RCVD: CPT | Performed by: INTERNAL MEDICINE

## 2022-03-16 PROCEDURE — 2580000003 HC RX 258: Performed by: INTERNAL MEDICINE

## 2022-03-16 PROCEDURE — 1123F ACP DISCUSS/DSCN MKR DOCD: CPT | Performed by: INTERNAL MEDICINE

## 2022-03-16 PROCEDURE — 85025 COMPLETE CBC W/AUTO DIFF WBC: CPT

## 2022-03-16 PROCEDURE — 96523 IRRIG DRUG DELIVERY DEVICE: CPT

## 2022-03-16 PROCEDURE — G8417 CALC BMI ABV UP PARAM F/U: HCPCS | Performed by: INTERNAL MEDICINE

## 2022-03-16 PROCEDURE — 99211 OFF/OP EST MAY X REQ PHY/QHP: CPT

## 2022-03-16 PROCEDURE — G8427 DOCREV CUR MEDS BY ELIG CLIN: HCPCS | Performed by: INTERNAL MEDICINE

## 2022-03-16 RX ORDER — SODIUM CHLORIDE 0.9 % (FLUSH) 0.9 %
20 SYRINGE (ML) INJECTION PRN
Status: CANCELLED | OUTPATIENT
Start: 2022-03-16

## 2022-03-16 RX ORDER — SODIUM CHLORIDE 0.9 % (FLUSH) 0.9 %
20 SYRINGE (ML) INJECTION PRN
Status: DISCONTINUED | OUTPATIENT
Start: 2022-03-16 | End: 2022-03-17 | Stop reason: HOSPADM

## 2022-03-16 RX ORDER — SODIUM CHLORIDE 0.9 % (FLUSH) 0.9 %
10 SYRINGE (ML) INJECTION PRN
Status: CANCELLED | OUTPATIENT
Start: 2022-03-16

## 2022-03-16 RX ORDER — SODIUM CHLORIDE 0.9 % (FLUSH) 0.9 %
10 SYRINGE (ML) INJECTION PRN
Status: DISCONTINUED | OUTPATIENT
Start: 2022-03-16 | End: 2022-03-17 | Stop reason: HOSPADM

## 2022-03-16 RX ORDER — HEPARIN SODIUM (PORCINE) LOCK FLUSH IV SOLN 100 UNIT/ML 100 UNIT/ML
500 SOLUTION INTRAVENOUS PRN
Status: CANCELLED | OUTPATIENT
Start: 2022-03-16

## 2022-03-16 RX ORDER — HEPARIN SODIUM (PORCINE) LOCK FLUSH IV SOLN 100 UNIT/ML 100 UNIT/ML
500 SOLUTION INTRAVENOUS PRN
Status: DISCONTINUED | OUTPATIENT
Start: 2022-03-16 | End: 2022-03-17 | Stop reason: HOSPADM

## 2022-03-16 RX ADMIN — HEPARIN 500 UNITS: 100 SYRINGE at 11:44

## 2022-03-16 RX ADMIN — SODIUM CHLORIDE, PRESERVATIVE FREE 20 ML: 5 INJECTION INTRAVENOUS at 11:43

## 2022-03-16 RX ADMIN — SODIUM CHLORIDE, PRESERVATIVE FREE 20 ML: 5 INJECTION INTRAVENOUS at 11:44

## 2022-03-16 RX ADMIN — HEPARIN 500 UNITS: 100 SYRINGE at 11:45

## 2022-03-21 ENCOUNTER — OFFICE VISIT (OUTPATIENT)
Dept: CARDIOLOGY CLINIC | Age: 82
End: 2022-03-21
Payer: MEDICARE

## 2022-03-21 VITALS
BODY MASS INDEX: 30.39 KG/M2 | SYSTOLIC BLOOD PRESSURE: 138 MMHG | DIASTOLIC BLOOD PRESSURE: 78 MMHG | OXYGEN SATURATION: 97 % | HEART RATE: 86 BPM | WEIGHT: 178 LBS | HEIGHT: 64 IN

## 2022-03-21 DIAGNOSIS — I42.8 NONISCHEMIC CARDIOMYOPATHY (HCC): ICD-10-CM

## 2022-03-21 DIAGNOSIS — I10 ESSENTIAL HYPERTENSION: Primary | ICD-10-CM

## 2022-03-21 DIAGNOSIS — I47.29 NSVT (NONSUSTAINED VENTRICULAR TACHYCARDIA): ICD-10-CM

## 2022-03-21 PROCEDURE — 4040F PNEUMOC VAC/ADMIN/RCVD: CPT | Performed by: NURSE PRACTITIONER

## 2022-03-21 PROCEDURE — G8427 DOCREV CUR MEDS BY ELIG CLIN: HCPCS | Performed by: NURSE PRACTITIONER

## 2022-03-21 PROCEDURE — G8484 FLU IMMUNIZE NO ADMIN: HCPCS | Performed by: NURSE PRACTITIONER

## 2022-03-21 PROCEDURE — 1036F TOBACCO NON-USER: CPT | Performed by: NURSE PRACTITIONER

## 2022-03-21 PROCEDURE — 1123F ACP DISCUSS/DSCN MKR DOCD: CPT | Performed by: NURSE PRACTITIONER

## 2022-03-21 PROCEDURE — 99214 OFFICE O/P EST MOD 30 MIN: CPT | Performed by: NURSE PRACTITIONER

## 2022-03-21 PROCEDURE — G8417 CALC BMI ABV UP PARAM F/U: HCPCS | Performed by: NURSE PRACTITIONER

## 2022-03-21 ASSESSMENT — ENCOUNTER SYMPTOMS
SHORTNESS OF BREATH: 0
WHEEZING: 0
COUGH: 0
CHEST TIGHTNESS: 0
SORE THROAT: 0

## 2022-03-21 NOTE — PROGRESS NOTES
[id number] [facility] Cardiology   Established Patient Office Visit   Dang Vera. [address] [facility]  [phone]        OFFICE VISIT:  3/21/2022    Lou Waterman - : 1940    Reason For Visit:  Garfield Galeana is a 80 y.o. male who is here for 3 Month Follow-Up    1. Essential hypertension    2. NSVT (nonsustained ventricular tachycardia) ([facility]    3. Nonischemic cardiomyopathy ([facility]        Patient with a history of mild coronary artery disease, nonsustained ventricular tachycardia during an EGD, nonischemic cardiomyopathy, stage IV metastatic adenocarcinoma of the lung on maintenance therapy and hypertension.     Patient was seen in our office on 2021 with an elevated blood pressure. Patient was given digital blood pressure cuff and was instructed to keep home blood pressure logs. Patient was started on losartan 50 mg by his kidney doctor. In addition to being on the Toprol-XL 12.5 mg daily.     Patient presented abck to clinic on 2021 for blood pressure evaluation. Patient denied any complaints. Blood pressure well controlled then 118/80. Patient presents to clinic today for 3-month follow-up. Patient denies any complaints of chest pain, pressure or tightness. There is no shortness of breath, orthopnea or PND. Patient denies any lightheadedness, dizziness or syncope.     Subjective    Lou Waterman is a 80 y.o. male with the [facility]:    Patient Active Problem List    Diagnosis Date Noted    Encounter for central line care 2022    Hypoxia 2020    Fatigue associated with anemia 2020    Symptomatic anemia 2020    Monoclonal gammopathy     Other iron deficiency anemias     Chronic kidney disease, stage IV (severe) (HCC)     Anemia, unspecified     Malignant neoplasm (HCC)     Secondary malignant neoplasm of other digestive organs (HCC)     Abnormal weight loss     Other fatigue     Cellulitis of unspecified part of limb  Non-sustained ventricular tachycardia (HCC)     Gout     Duodenal ulcer     Malignant neoplasm of upper lobe, right bronchus or lung (HCC)      Non-small cell lung cancer ([facility] 06/04/2019    Pneumonia due to infectious organism 03/18/2019    Cellulitis of right upper limb 03/18/2019    Stage 3 chronic kidney disease [facility] 03/18/2019    Severe protein-calorie malnutrition ([facility] 03/18/2019    Normocytic anemia 03/18/2019    Hydronephrosis, left     Right upper quadrant abdominal mass     Essential hypertension 10/02/2017    Cardiomyopathy, nonischemic ([facility] 10/02/2017    NSVT (nonsustained ventricular tachycardia) ([facility] 10/02/2017     Current Outpatient Medications   Medication Sig Dispense Refill    losartan (COZAAR) 50 MG tablet Take 50 mg by mouth daily      melatonin 3 MG TABS tablet Take 10 mg by mouth daily      acetaminophen (TYLENOL) 500 MG tablet Take 500 mg by mouth 2 times daily      metoprolol succinate (TOPROL XL) 25 MG extended release tablet Take 0.5 tablets by mouth daily 45 tablet 3    sodium bicarbonate 650 MG tablet Take 650 mg by mouth 2 times daily      pantoprazole (PROTONIX) 40 MG tablet Take 40 mg by mouth daily      tamsulosin (FLOMAX) 0.4 MG capsule Take 0.4 mg by mouth daily      Multiple Vitamin (MULTI VITAMIN DAILY PO) Take by mouth daily        No current facility-administered medications for this visit.      Allergies: Penicillins  Past Medical History:   Diagnosis Date    Abnormal weight loss     Anemia     Anemia, unspecified     Blind left eye     Cardiomyopathy ([facility]     with compensated CHF    Cellulitis     Cellulitis of unspecified part of limb     Chronic kidney disease, stage IV (severe) ([facility])     Dr. Edgardo Mcnamara COPD (chronic obstructive pulmonary disease) ([facility]     Duodenal ulcer     Encounter for central line care 3/16/2022    Essential hypertension 10/2/2017    Eye injury     at age 12 from penetrating injury    Gout     HTN (hypertension)  Hydronephrosis     Hydronephrosis, left     Liver abscess     resolved    Lung nodule     Malignant neoplasm (HCC)     Malignant neoplasm of upper lobe, right bronchus or lung (HCC)     MGUS (monoclonal gammopathy of unknown significance)     secondary to an IgG kappa. IgG level in 2,000 range    Monoclonal gammopathy     NICM (nonischemic cardiomyopathy) ([facility]     Non-small cell lung cancer ([facility] 2019    Non-sustained ventricular tachycardia (HCC)     NSVT (nonsustained ventricular tachycardia) (HCC)     Osteoarthritis     Other fatigue     Other iron deficiency anemias     Secondary malignant neoplasm of other digestive organs ([facility]     Weight loss      Past Surgical History:   Procedure Laterality Date    BRONCHOSCOPY  2018    dx of adenocarcinoma RUL  Dr. Alisha Heart CATH LAB PROCEDURE      ENDOSCOPY, COLON, DIAGNOSTIC  2019    aborted d/t vtach    EYE SURGERY Left     EYE SURGERY  1964    FOOT SURGERY      removal of joint from right toe from drilling accident, [facility]  10/29/2003    with resection  Mitul-en-Y proecedure  DR. Mcfarlane    TUNNELED VENOUS PORT PLACEMENT      UPPER GASTROINTESTINAL ENDOSCOPY N/A 3/13/2019    EGD W/EUS FNA performed by Susan Downs MD at [facility]     Family History   Problem Relation Age of Onset    Osteoporosis Mother     High Blood Pressure Mother     Asthma Mother     Bleeding Prob Father     Cancer Father         leukemia    Asthma Brother      Social History     Tobacco Use    Smoking status: Former Smoker     Packs/day: 2.00     Years: 40.00     Pack years: 80.00     Types: Cigarettes     Quit date: 10/29/2003     Years since quittin.4    Smokeless tobacco: Never Used   Substance Use Topics    Alcohol use: No          Review of Systems:    Review of Systems   Constitutional: Negative for activity change, chills, diaphoresis, fatigue and fever. HENT: Negative for congestion and sore throat. Respiratory: Negative for cough, chest tightness, shortness of breath and wheezing. Cardiovascular: Negative for chest pain and palpitations. Neurological: Negative for dizziness, syncope, light-headedness and headaches. Psychiatric/Behavioral: Negative for confusion. The patient is not nervous/anxious. Objective:    /78   Pulse 86   Ht 5' 4\" (1.626 m)   Wt 178 lb (80.7 kg)   SpO2 97%   BMI 30.55 kg/m²     GENERAL - well developed and well nourished, conversant  HEENT -  PERRLA, Hearing appears normal, gentleman lids are normal.  External inspection of ears and nose appear normal.  NECK - no thyromegaly, no JVD, trachea is in the midline  CARDIOVASCULAR - PMI is in the mid line clavicular position, Normal S1 and S2 with no systolic murmur. No S3 or S4    PULMONARY - no respiratory distress. No wheezes or rales. Lungs are clear to ausculation, normal respiratory effort. ABDOMEN  - soft, non tender, no rebound  MUSCULOSKELETAL  - range of motion of the upper and lower extermites appears normal and equal and is without pain   EXTREMITIES - no significant edema   NEUROLOGIC - gait and station are normal  SKIN - turgor is normal, can warm and dry. PSYCHIATRIC - normal mood and affect, alert and orientated x 3,      ASSESSMENT:    ALL THE CARDIOLOGY PROBLEMS ARE LISTED ABOVE; HOWEVER, THE FOLLOWING SPECIFIC CARDIAC PROBLEMS / CONDITIONS WERE ADDRESSED AND TREATED DURING THE OFFICE VISIT TODAY:                                                                                            MEDICAL DECISION MAKING             Cardiac Specific Problem / Diagnosis  Discussion and Data Reviewed Diagnostic Procedures Ordered Management Options Selected           1. Hypertension  Stable   Review and summation of old records:    Pressure today 138/78. O2 sat 97%. Patient does have a little bit of whitecoat syndrome.   Home blood pressure readings are well controlled. Patient to continue Toprol-XL 12.5 mg daily. Losartan 50 mg daily. No Continue current medications:     Yes           2. History of nonsustained VT  Stable   No recurrence. Patient is on Toprol-XL 12.5 mg daily. No Continue current medications:    Yes           3. Nonischemic cardiomyopathy Stable  no overt signs of failure. Patient is on losartan 50 mg daily. No Continue current medications: Yes                     No orders of the defined types were placed in this encounter. No orders of the defined types were placed in this encounter. Discussed with patient. Return in about 6 months (around 9/21/2022) for Routine with me . I greatly appreciate the opportunity to meet Shahnaz Kent and your confidence in allowing me to participate in his cardiovascular care. ALEX Noyola NP  3/21/2022 11:13 AM CDT                    This dictation was generated by voice recognition computer software. Although all attempts are made to edit dictation for accuracy, there may be errors in the transcription that are not intended.

## 2022-06-28 NOTE — PROGRESS NOTES
Patient:  Ranjan Chavez  YOB: 1940  Date of Service: 6/29/2022  MRN: [id number]    Primary Care Physician: Enolia Simmonds, MD    Chief Complaint   Patient presents with    Follow-up     Non-small cell lung cancer, unspecified laterality [facility])       Patient Seen, Chart, Consults notes, Labs, Radiology studies reviewed. Subjective:  Ranjan Chavez is a [de-identified] y.o.  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 · CKD and chemotherapy associated ANEMIA, previously on Procrit, currently on hold having completed chemotherapy with further improvement in hemoglobin. · BPH on Flomax  · Orthostatic hypotension medications managed by Dr. Eva Mir    He completed 4 cycles of combination chemotherapy with carboplatin, Keytruda, Alimta on 7/5/2019.    He was then changed to maintenance therapy with Keytruda and Alimta every 3 weeks. Prabhjot received his final cycle #25 of Keytruda/Alimta on 10/29/2020. Treatment was held since that time due to issues of dyspnea and shortness of breath, requiring steroids. Alfonso Akhtar presents today for continued monitoring and review of recent chest x-ray requested to be done for today's visit. 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     · Yamile Castelan has benign prostatic hypertrophy (BPH)  that is stable on Flomax. · Orthostatic hypotension resolved with adjustment of metoprolol by Dr. Eva Mir   · He takes Tambocor, metoprolol without new cardiac issues. · Protonix continues without any new exacerbations of GERD. · Lower extremity edema overall has actually improved to some degree     Not able to get his breath completely. He uses supplemental oxygen sparingly.     Tanika Aries feels remarkably well, he still has issues of dyspnea on moderate exertion but less so than previously. He is not able to get his breath completely. He uses supplemental oxygen sparingly. Cardiac medications have been adjusted to reduce potential for pulmonary fibrosis. He is pleased with the way things are going. He has right leg discomfort but otherwise no new complaints. Monique Croft had a chest x-ray on 3/3/2022 and comes for continued monitoring. TUMOR HISTORY: Adenocarcinoma on the RUL of the lung 11/27/2018  Aman Vickers had CT scans performed for new onset of weight loss of 13 pounds over the previous year , associated with increased fatigue. He denied respiratory symptoms of shortness of air, cough or productive sputum.     A CT scan of the chest on 9/12/2018 had been ordered by Dr. Navin Chandler due to weight loss and identified a new lobulated right upper lobe 5.7 cm mass that extended back to the right hilum. Numerous mediastinal lymph nodes ranging in size from mm to 1.3 cm and a subcarnial lymph node that measured 1.5 x 1.5 x 3.5 cm.     A CT scan of the abdomen and pelvis with contrast on 9/12/2018 documented a 4.9 x 4 x 4 cm soft tissue mass with peripheral calcification immediately adjacent to the gallbladder in the head of the pancreas area.     An MRI of the abdomen and pelvis W & WO on 10/11/2018 identified in the 4.1 x 3.4 cm soft tissue mass in the subhepatic space, abutting the second portion of the duodenum with mild displacement of the duodenum. There does not appear to be involvement of the pancreas, and the pancreas appears within normal limits. Differential diagnoses include a desmoid tumor, less likely leiomyoma of the wall of the duodenum or malignancy. Dr. Shasha Leal requested a CT-guided biopsy of the right upper lobe mass. Dr. Carmina Vang, the radiologist, evaluated the area with a repeat CT scan of the chest on 10/11/2018.    He spoke with Dr. Shasha Leal indicating that he would not be able to perform the biopsy because the tumor was not accessible, it was under the scapula , which blocked access and therefore the biopsy procedure was canceled.     On the CT scan of the chest on 10/11/2018 measurements of the RUL lung mass was 5.7 x 3.2 cm with irregular margins suspicious for a primary lung neoplasm. Soft tissue associated with the tumor appeared to extend into the bronchus supplying this portion of the RUL of the lung. Dr. Cathy Whitehead indicated that the mass had an endobronchial component and should be easily assessable via bronchoscopy.     The chest CT also included a portion of the upper abdomen where a soft tissue mass was described in an addendum report. In the subhepatic space measuring 4.0 x 3.9 cm, displacing the second portion of the duodenum medially. A referral was made to Dr. Kendall Andrade for consideration for bronchoscopy for biopsy.     On 10/24/2018, Dr. Carlos Barrera performed a bronchoscopy with EBUS guided biopsy of a 3 cm subcarinal lymph node along with bronchoalveolar lavage of the posterior segment of the RUL of the lung. The final pathology of the station 7 lymph node only revealed a background of small mature lymphocytes with clusters of histiocytes suggestive of granuloma. Negative for malignant cells. Kinyoun and GMS stains were negative for AFB and fungal organisms respectively. The bronchial washings were negative for malignant cells as well.      Mr. Linda Tucker was referred to Dr. Praveena Jackson at [facility] in [place], [place], for navigational bronchoscopy and biopsy under ultrasound.     On 11/27/2018 Dr. Praveena Jackson performed a bronchoscopy, navigational protocol, endobronchial ultrasound and biopsy of the RUL of the lung mass along with multiple lymph node station Biopsies.   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    · All lymph nodes sampled were NEGATIVE for malignant cells including stations 10L, 4L, station 7, 10R, 4R.   · IHC studies were positive for CK7, TTF-1 and Napsin A.   · IHC studies on tumor cells were negative for CDX2, CK5/6, and p63   · Further lung profile molecular testing is pending     All of the above was discussed at length with Mr. Rosa Carrera at his 12/13/2018 clinic visit. He was agreeable for referral for consideration of resection of the lung lesion.      He is comfortable with and would like a referral to Dr. Ashia Perez ([phone]) at [place] [address], [address] [place] [address], [address] [place] [address], [address] [place] [address]. Logistics, however, are planning a big part in his decision making and he may decide to have surgery done in the [facility] area.     If he decides to have surgery in the [facility], referral will be made to Dr. Daneen Hamman.      CT chest with contrast 2/18/2019 at [facility] to 9/12/2018 revealed a 4.9 x 3.5 cm spiculated RUL lung mass which actually decreased in size from a prior value of 6.1 x 3.6 cm. Subhepatic mass adjacent to the descending duodenum had increased in size significantly to 4.3 x 9 cm compared to 4.1 x 3.4 cm on the 10/11/18 MRI of the abdomen.     CT of the abdomen and pelvis without contrast on 3/20/2019 at [facility] was compared to outpatient CT data and pelvis on 9/12/2018 an MRI of the abdomen from 10/11/2018. A soft tissue mass was again encountered in the subhepatic space which abutted the distal stomach and proximal duodenum measuring 8.9 x 5.4 cm which has increased considerably from a prior measurement of 4.1 x 3.4 cm. Medial to the left renal hilum a 3.5 cm lobular mass causing some mild left-sided hydronephrosis.     Mr. Rosa Carrera was referred to Dr. Daneen Hamman who saw him on 1/22/2019 anticipating plans for a curative resection.      Dr. Inetta Gaucher received a phone call on 2/20/2019 from Dr. Madelaine Daly , indicating that the previously documented an abdominal pancreatic area mass had doubled in size and was causing compression into the left kidney/renal pelvis producing a hydroureter.      Dr. Carmela Alcazar arranged a referral to Dr. Michael Montano who will be placing a stent 3/8/2019.     Mr. Tiffanie Hampton was scheduled to be seen by gastroenterology at [facility] on 3/13/2019 for EGD/EUS assessment and biopsy of the enlarging peripancreatic/duodenal area mass. With a decrease in the lung mass and increased size of the subhepatic mass-surgical resection was abandoned at present pending further evaluation of the subhepatic mass. Dr. Paco Barrera discussed treatment options with the unresectable lung mass and the per he pancreatic mass and recommended a combination of Keytruda, Alimta, carboplatin. He was subsequently admitted to [facility] 4/26 - 4/30/2019 with abdominal pain and melena. WBC fantasma was 0.75 with ANC 0.34. PLT did drop to 24,000. He did have duodenal ulcerations that were bleeding on endoscopy. He was stabilized but then readmitted from 5/8 - 5/10/2019 with recurrent melanoma. A repeat endoscopy was performed. It was felt that exacerbation of his bleeding from the duodenal came about by his thrombocytopenia from treatment. Subsequent dosing was adjusted and Neulasta was added.     CT chest without contrast at [facility] on 6/27/2019 was compared to 2/18/2019 revealed that the right lung mass that extended into the right hilum has decreased from 5.2 x 3.5 down to 4.6 x 3.6. There is increased central cavitation in the mass consistent with tumor necrosis. Mediastinal lymphadenopathy is relatively stable.     CT abdomen and pelvis without contrast at [facility] on 6/27/2019 was compared to 4/26/2019 revealed complete resolution of the previously identified duodenal/subhepatic space soft tissue mass. The previously identified heterogenous enhancing lesion in the left renal pelvis was much less prominent but there does continue to be some mild left caliectasis.     I reviewed the CT results with Dr. Paco Barrera on 7/5/2019 who then relayed them as well to [facility]. We've had excellent results from this combination of therapy. He completed the fourth combination therapy of Rhonda Fountain on 7/5/2019 and then transitioned to maintenance Keytruda plus Alimta.     CT chest without contrast at [facility] on 1/9/2020 was compared to 10/17/2019 revealing:   · A stable spiculated RUL nodule/mass with similar mediastinal adenopathy. · Questionable right hilar adenopathy with diminished assessment due to lack of IV contrast.    · Advanced emphysema with severe pulmonary fibrosis. · No new pulmonary nodules.     CT abdomen and pelvis without contrast at [facility] on 1/9/2020 was compared to 10/17/2019 revealing:   · Mildly prominent aortocaval RP lymph nodes with position just below the level of the renal vein worrisome for potential lymphatic metastases. This is similar to 10/17/2019 but increased from 4/26/2019. · Pneumobilia without discrete suspicious focal lesion in the liver. · Wall thickening of the duodenum. · Similar and stable renal lesions favoring cysts.     He has had numerous endoscopies within the past year. While he was having an Endo EUS and had cardiac issues and was actually admitted to the intensive care unit. CT abdomen and pelvis without contrast on 7/16/2020 at [facility] was compared to 1/9/2020 and documented:  · 1.5 x 0.9 cm aortocaval lymph node, previously 1.9 x 1.2 cm  · No new abnormality seen    CT chest without contrast on 11/12/2020 at [facility] ws compared to 1/9/2020 and documented:  · Mixed response therapy with interval decrease in size in the RIGHT upper lobe pulmonary nodule but increase in size and mediastinal lymph nodes. · In addition, there is severe emphysema with findings of severe pulmonary fibrosis. Interval worsening of interstitial opacities bilaterally as well as suggestion of some pleural nodularity. Lymphangitic spread of malignancy should be considered.   · Small pleural effusion on the RIGHT is new     CT abdomen and pelvis without contrast on 11/12/2020 at [facility] was compared to 7/16/2020 and documented:  · The aortic caval node described on the comparison study is not significantly changed in size when measured at the same location. · Nonobstructing renal calculus on the LEFT. No change in the indeterminate renal lesions on the LEFT, likely cysts. 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     · Dennys Hester has benign prostatic hypertrophy (BPH)  that is stable on Flomax. · Orthostatic hypotension resolved with adjustment of metoprolol by Dr. Blanchard Number   · He takes Tambocor, metoprolol without new cardiac issues. · Protonix continues without any new exacerbations of GERD. · Lower extremity edema overall has actually improved to some degree. Prabhjot received his final cycle #25 of Keytruda/Alimta on 10/29/2020. Treatment was held since that time due to issues of dyspnea and shortness of breath. A course of steroids (prednisone) was initiated and antibiotics also delivered and although his respiratory issues improved, he has continued to have issues with weight loss. CXR on 12/17/2020 documented no interval change, but in my personal review of the x-ray, there is significant pulmonary fibrosis not significantly changed compared to the x-ray from October 2020. I discussed the findings on the chest x-ray and the comparison at his clinic visit on 12/30/2020. He does not appear to be improving but rather quite stable. Tonie Jarrett has pulmonary fibrosis as documented on a CT scan of the chest without contrast at [facility] on 11/12/2020.   Dennys Hester is in agreement to go on a chemotherapy holiday with a repeat CT scan of the chest in 3 months and monitor his situation clinically and radiographically without systemic intervention going forward. CT CHEST WO  contrast on 3/17/2021, compared to 11/12/2020 at [facility] documented:   · Mixed response therapy with interval decrease in size in the RIGHT upper lobe pulmonary nodule but increase in size and mediastinal lymph nodes. · Slight interval decrease in size in the RIGHT upper lobe nodule/mass measuring 4.7 x 2.2 cm today, previously 5.0 x 2.4 cm. RIGHT apical nodular density measuring up to 0.6 cm, previously 0.4 cm. · In addition, there is severe emphysema with findings of severe pulmonary fibrosis. Interval worsening of interstitial opacities bilaterally as well as suggestion of some pleural nodularity. Lymphangitic spread of malignancy should be considered. · Small pleural effusion on the RIGHT is new. CT ABD & PELVIS  WO contrast on 3/17/2021, compared to 11/12/2020,  at [facility] documented:  · Nonobstructing calculus lower pole left kidney  · Low-density lesions associated with the left renal contour probably proteinaceous cyst these are unchanged  · Chronic right lateral pleural complex in the lung base with bilateral small basilar pneumothoraces. .     XR CHEST (2 VW) [facility] , compared to December 17, 2020, documented:  · Advanced interstitial fibrotic changes noted throughout the pulmonary parenchyma unchanged from December 17, 2020. · Small to moderate right lateral pleural complex. This may be pleural fluid or thickening is unchanged December 17, 2020. XR CHEST (2 VW)  6/16/2021:  · 2.5 cm area of residual nodularity versus scarring in the RUL zone. · Probable small pleural effusions. · Bilateral interstitial infiltrates stable compared to the 2 most recent studies but increased compared to the 2019 study. · There may beunderlying pulmonary fibrosis that is worsening.    · Pulmonary edema is not ruled out.      CT CHEST WO CONTRAST DIAGNOSTIC at [facility]- 11/15/2021:Comparison: CT chest without contrast 8/25/2021  · 3.0 x 2.2 cm spiculated mass in the right upper lobe, stable  · There is pleural thickening lateral to the mass with bands of probable fibrosis, stable   · 4 mm RUL there is a small stellate shaped nodule, previously 5 mm  · 3 mm nodule in the right lower lobe posterior to the major fissure, unchanged  · There is a calcified granuloma in the medial basal segment left lower lobe   · Small calcified pleural plaques in the upper left hemithorax as before. · There are several partially calcified mediastinal lymph nodes which appears stable  · There is increased pleural thickening along the anterior margin of the right lower lobe. CT ABDOMEN PELVIS WO CONTRAST at [facility]- 11/15/2021:Comparison: CT abdomen pelvis without contrast 3/17/2021  · Review of lung windows demonstrates extensive reticular interstitial fibrosis in the lower lung zones, as well as loculated pleural fluid in the right lateral lung base with pleural thickening   · 10 mm.hyperattenuating exophytic nodule at the inferior pole the left kidney   · 7 mm nephrolithiasis at the inferior pole the left kidney. · The prostate gland is enlarged  · There is grade 1 spondylolisthesis at L5-S1 with disc space collapse. This is stable    XR CHEST (2 VW)  on 3/3/2022 at [address], compared to 8/16/21, documented:  · A 1.7 cm opacity in the right midlung zone appears smaller on the current study, previously measuring 2.5 cm. · Stable blunting of the costophrenic angles right greater than left. · Coarse interstitial lung disease appears relatively stable. Probable interstitial fibrosis      TREATMENT SUMMARY  · Keytruda, carboplatin, Alimta initiated 4/18/2019 to be given every 3 weeks for 4 cycles - 4th cycle 7/5/2019, then Keytruda + Alimta as maintenance to continue indefinitely until progression or intolerable side effects   · Prabhjot received cycle #25 of Cindy Lord on 10/29/2020.   He was then placed on an indefinite chemotherapy holiday on 12/30/2020         #2 TUMOR HISTORY: Pancreatic mass diagnosed 10/29/03  Mr. Tiffanie Hampton presented in October 2003 with obstructive jaundice. A CT scan of the abdomen on 10/26/2003 documented a 3.2 x 4 x 4.2 cm mass in the head of the pancreas.      On 10/29/03 Dr. Bruno Guillory performed a resection of a portion of the head of the pancreas on [facility] along with a Mitul-en-Y procedure and a choledochojejunostomy for what was felt to be a pancreatic cancer. His pancreas was bypassed because of the findings. Pathology of the biopsies were negative for malignancy showing a fibrosed pancreas. Mr. Tiffanie Hampton was referred to Dr. Lisha Swan in [place].     Dr. Lisha Swan performed ERCP with an EUS and biopsy on 02/26/04   Pathology again was negative for cancer or malignancy. Routine periodic serologic and radiographic monitoring has not disclosed progression of the mass or development of new malignancy or increase in pancreatic tumor markers.      A CT scan of the abdomen and pelvis with contrast on 9/12/2018 documented a 4.9 x 4 x 4 cm soft tissue mass with peripheral calcification immediately adjacent to the gallbladder in the head of the pancreas area.     An MRI of the abdomen and pelvis W & WO on 10/11/2018 identified in the 4.1 x 3.4 cm soft tissue mass in the subhepatic space, abutting the second portion of the duodenum with mild displacement of the duodenum. There does not appear to be involvement of the pancreas, and the pancreas appears within normal limits. Differential diagnoses include a desmoid tumor, less likely leiomyoma of the wall of the duodenum or malignancy.     CT of the abdomen and pelvis without contrast on 3/20/2019 at [facility] was compared to outpatient CT data and pelvis on 9/12/2018 an MRI of the abdomen from 10/11/2018.  A soft tissue mass was again encountered in the subhepatic space which abutted the distal stomach and proximal duodenum measuring 8.9 x 5.4 cm which has increased considerably from a prior measurement of 4.1 x 3.4 cm. Medial to the left renal hilum a 3.5 cm lobular mass causing some mild left-sided hydronephrosis.     Mr. Rk Amaya was scheduled for an EGD/EUS by Dr. Lou Hurd as an outpatient procedure on 3/13/2019 for assessment and biopsy of the enlarging peripancreatic/duodenal area mass. Unfortunately, after initiation of the procedure, he began having ventricular tachycardia and the procedure had to be aborted. Valentino Jews was transferred to the ICU for cardiology evaluation and stabilization. He remained hospitalized until 3/18/2019 when he was medically cleared for discharge.     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 No emergent urologic intervention was warranted and he received monitoring of renal function. He had a creatinine level of 1.9 at discharge.     PET scan on 4/2/2019 identified a soft tissue mass in the RUL measuring 1.2 x 3.5 cm with extension to the right anterior hilum with an SUV of 10.1. Evidence of mediastinal and hilar lymphadenopathy, with low-level metabolic activity. Interval increase in the size of a large mass in the right upper abdomen inseparable from the duodenum measuring 10.7 x 6.9 cm compared to 5.4 x 8.9 cm on 2/20/2019 with an SUV of 23.6. Slight increase in 2 small inseparable masses medial to the hilum of the left kidney measuring 2.2 and 2.6 cm and the other measuring 3.9 cm with a maximum SUV of 18. 6.     Cycle #1 of palliative chemotherapy with Carboplatin, Alimta and Keytruda was initiated 4/18/19 which should also cover this process.     Abdominal/pelvic CT 4/26/19 was performed at [facility] due to abdominal pain and melena.  The RUQ mass, within the duodenum decreased to 6.8 x 6.2 cm (was 10.7 x 6.9 cm on PET 4/2/19)     CT abdomen and pelvis without contrast at [facility] on 6/27/2019 was compared to 4/26/2019 revealed complete resolution of the previously identified. Duodenal/subhepatic space soft tissue mass. The previously identified heterogenous enhancing lesion in the left renal pelvis was much less prominent but there does continue to be some mild left caliectasis.     CT chest without contrast at [facility] on 1/9/2020 was compared to 10/17/2019 revealing:   · A stable spiculated RUL nodule/mass with similar mediastinal adenopathy. · Questionable right hilar adenopathy with diminished assessment due to lack of IV contrast.    · Advanced emphysema with severe pulmonary fibrosis. · No new pulmonary nodules.     CT abdomen and pelvis without contrast at [facility] on 1/9/2020 was compared to 10/17/2019 revealing:   · Mildly prominent aortocaval RP lymph nodes with position just below the level of the renal vein worrisome for potential lymphatic metastases. This is similar to 10/17/2019 but increased from 4/26/2019. · Pneumobilia without discrete suspicious focal lesion in the liver. · Wall thickening of the duodenum. · Similar and stable renal lesions favoring cysts.     CT ABD & PELVIS  WO contrast on 3/17/2021, compared to 11/12/2020,  at [facility] documented:  · Nonobstructing calculus lower pole left kidney  · Low-density lesions associated with the left renal contour probably proteinaceous cyst these are unchanged  · Chronic right lateral pleural complex in the lung base with bilateral small basilar pneumothoraces. .     He has had numerous endoscopies within the past year. When he was having an Endo EUS he had cardiac issues and required to the intensive care unit. This area will just be monitored on follow-up scans without further elective endoscopic surveillance. .     CT ABDOMEN PELVIS WO CONTRAST at [facility]- 11/15/2021:Comparison: CT abdomen pelvis without contrast 3/17/2021  · Review of lung windows demonstrates extensive reticular interstitial fibrosis in the lower lung zones, as well as loculated pleural fluid in the right lateral lung base with pleural thickening   · 10 mm.hyperattenuating exophytic nodule at the inferior pole the left kidney   · 7 mm nephrolithiasis at the inferior pole the left kidney. · The prostate gland is enlarged  There is grade 1 spondylolisthesis at L5-S1 with disc space collapse. This is stable             #1 HEMATOLOGICAL HISTORY: IgG kappa MGUS- Dx: 02/23/06. During the course of Mr. Mack Boas follow up, an abnormally elevated protein was noted on one occasion, which led to workup including quantitative immunoglobulins.      An elevated IgG kappa was encountered. This has remained stable in the 2500 range since early 2006.      A bone marrow biopsy and aspirate on 02/23/06 was negative with only 4% plasma cells.      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      24 hour urine for immunoelectrophoresis did not reveal an M-spike. Serology studies on 9/18/2018 documented an elevated, stable IgG of 2589 with an M spike of 0.7. Immunofixation continued to reveal IgG monoclonal protein with kappa light chain specificity.       #2 HEMATOLOGICAL HISTORY: Iron deficiency anemia, 9/18/2018  Gage Dove had serology on 9/18/2018 that identified iron deficiency anemia. His lab work was obtained at [facility].  An iron level was 12, iron saturation 7%, ferritin 256  Folate >20, and vitamin B12 1044. Dr. Beata Roberts placed Sophia on ferrous sulfate 325 mg daily on 9/25/2018 , but this failed to resolve the anemia.     An EGD by Dr. Jaiden Benítez on 12/11/2018 documented a normal esophagus, normal stomach and a degree of duodenal deformity. Gastric biopsy was urease negative.     Colonoscopy by Dr. Jaiden Benítez on 1/9/2019 documented a polyp at 80 cm. Pathology identified a tubular adenoma, negative for high-grade dysplasia.    Feraheme administered.     Labs on 3/13/2019:  · Iron 25   · Iron saturation 22%   · TIBC 116   · Ferritin >2000   · Reticulocyte count 0.0578   · LDH 107   · Haptoglobin 341   · Folate >20   · Vitamin B12 945   · Erythropoietin 13     He presented to [facility] on 4/26/2019 with melena and abdominal discomfort. He was subsequently admitted     EGD per Dr. Bekah Kruger on 4/29/2019 revealed  · LA grade (1 or more mucosal breaks greater than 5 mm, not extending between the tops of the 2 mucosal folds)? esophagitis with no bleeding found in the distal esophagus   · Stomach was normal, biopsies for H. pylori taken. · Cardia and gastric fundus were normal on retroflexion   · One nonbleeding cratered duodenal ulcer with no stigmata of bleeding was found in the duodenal bulb lesion was about 9 mm. · Many nonbleeding cratered, linear and superficial duodenal ulcers with no stigmata of bleeding were found in the second portion of the duodenum. The largest lesion was 6 mm in largest dimension. No mass encountered and anastomosis not seen.     He was admitted to [facility] on 5/8/2019 with melena, hematochezia, anemia, thrombocytopenia     Endoscopy performed by Dr. Halina Liao on 5/9/2019 revealed  · Normal esophagus   · Gastric mucosal variant. 2 erythematous spots in the antrum, ? AVM   · Normal examined duodenum   · Nothing found to account for symptoms   He developed pancytopenia from chemotherapy and did require transfusions. His hemoglobin dropped to 7.3 on 6/25/2019 after his last treatment. He received 2 units packed red blood cells as an outpatient.     Serology 6/13/2019  Serum Fe - 117  TIBC - 587  Fe sat - 19.9%  Ferritin - 1,000  Iron levels have been adequately replaced. 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 TREATMENT SUMMARY  1. Feraheme 510 mg IV on 2/14 and 2/21/19   2.  Venofer 200 mg IV daily 5/8 - 5/10/2019 as IP at [facility]   3. s/p 2 units pRBC on 4/26/19 and 2 units pRBC on 4/29/2019 as IP at [facility]?  s/p 2 units pRBC on?5/8/2019   s/p 2 pheresis plts on 5/8/2019  s/p 2 units pRBC as OP 6/26/2019    Allergies:  Penicillins    Medicines:  Current Outpatient Medications   Medication Sig Dispense Refill    losartan (COZAAR) 50 MG tablet Take 50 mg by mouth daily      melatonin 3 MG TABS tablet Take 10 mg by mouth daily      acetaminophen (TYLENOL) 500 MG tablet Take 500 mg by mouth 2 times daily      metoprolol succinate (TOPROL XL) 25 MG extended release tablet Take 0.5 tablets by mouth daily 45 tablet 3    sodium bicarbonate 650 MG tablet Take 650 mg by mouth 2 times daily      pantoprazole (PROTONIX) 40 MG tablet Take 40 mg by mouth daily      tamsulosin (FLOMAX) 0.4 MG capsule Take 0.4 mg by mouth daily      Multiple Vitamin (MULTI VITAMIN DAILY PO) Take by mouth daily        No current facility-administered medications for this visit.      Facility-Administered Medications Ordered in Other Visits   Medication Dose Route Frequency Provider Last Rate Last Admin    sodium chloride flush 0.9 % injection 10 mL  10 mL IntraVENous PRN Genevieve Clinton MD   10 mL at 06/29/22 1201    heparin flush 100 UNIT/ML injection 500 Units  500 Units IntraCATHeter PRN Geneveive Clinton MD   500 Units at 06/29/22 1201       Past Medical History:      Diagnosis Date    Abnormal weight loss     Anemia     Anemia, unspecified     Blind left eye     Cardiomyopathy ([address]     with compensated CHF    Cellulitis     Cellulitis of unspecified part of limb     Chronic kidney disease, stage IV (severe) ([address]     Dr. Milo Smith COPD (chronic obstructive pulmonary disease) ([address]     Duodenal ulcer     Encounter for central line care 3/16/2022    Essential hypertension 10/2/2017    Eye injury     at age 12 from penetrating injury    Gout     HTN (hypertension)     Hydronephrosis     Hydronephrosis, left     Liver abscess     resolved    Lung nodule     Malignant neoplasm ([address]     Malignant neoplasm of upper lobe, right bronchus or lung (HCC)     MGUS (monoclonal gammopathy of unknown significance)     secondary to an IgG kappa. IgG level in 2,000 range    Monoclonal gammopathy     NICM (nonischemic cardiomyopathy) [address]     Non-small cell lung cancer ([address] 2019    Non-sustained ventricular tachycardia (HCC)     NSVT (nonsustained ventricular tachycardia) (HCC)     Osteoarthritis     Other fatigue     Other iron deficiency anemias     Secondary malignant neoplasm of other digestive organs ([address]     Weight loss         Past Surgical History:      Procedure Laterality Date    BRONCHOSCOPY  2018    dx of adenocarcinoma RUL  Dr. Sabino Rivera CATH LAB PROCEDURE      ENDOSCOPY, COLON, DIAGNOSTIC  2019    aborted d/t vtach    EYE SURGERY Left     EYE SURGERY  1964    FOOT SURGERY      removal of joint from right toe from drilling accident, [address]  10/29/2003    with resection  Mitul-en-Y proecedure  DR. Mcfarlane    TUNNELED VENOUS PORT PLACEMENT      UPPER GASTROINTESTINAL ENDOSCOPY N/A 3/13/2019    EGD W/EUS FNA performed by Juan Antonio Arriola MD at [facility]        Family History:      Problem Relation Age of Onset    Osteoporosis Mother     High Blood Pressure Mother     Asthma Mother     Bleeding Prob Father     Cancer Father         leukemia    Asthma Brother         Social History  Social History     Tobacco Use    Smoking status: Former Smoker     Packs/day: 2.00     Years: 40.00     Pack years: 80.00     Types: Cigarettes     Quit date: 10/29/2003     Years since quittin.6    Smokeless tobacco: Never Used   Vaping Use    Vaping Use: Not on file   Substance Use Topics    Alcohol use: No    Drug use: No          Objective:  Vital Signs: Blood pressure 138/82, pulse 74, height 5' 4\" (1.626 m), weight 181 lb 1.6 oz (82.1 kg), SpO2 94 %. Labs:  BMP:   No results for input(s): NA, K, CL, CO2, PHOS, BUN, CREATININE, CALCIUM in the last 72 hours.   CBC: Recent Labs     06/29/22  1205   WBC 6.43   HGB 14.3   HCT 46.7   MCV 95.7*   *     LIVER PROFILE:   No results for input(s): AST, ALT, LIPASE, BILIDIR, BILITOT, ALKPHOS in the last 72 hours. Invalid input(s): AMYLASE,  ALB  PT/INR: No results for input(s): PROTIME, INR in the last 72 hours. APTT: No results for input(s): APTT in the last 72 hours. BNP:  No results for input(s): BNP in the last 72 hours. Ionized Calcium:No results for input(s): IONCA in the last 72 hours. Magnesium:No results for input(s): MG in the last 72 hours. Phosphorus:No results for input(s): PHOS in the last 72 hours. HgbA1C: No results for input(s): LABA1C in the last 72 hours. Hepatic:   No results for input(s): ALKPHOS, ALT, AST, PROT, BILITOT, BILIDIR, LABALBU in the last 72 hours. Lactic Acid: No results for input(s): LACTA in the last 72 hours. Troponin: No results for input(s): CKTOTAL, CKMB, TROPONINT in the last 72 hours. ABGs: No results for input(s): PH, PCO2, PO2, HCO3, O2SAT in the last 72 hours. CRP:  No results for input(s): CRP in the last 72 hours. Sed Rate:  No results for input(s): SEDRATE in the last 72 hours. Cultures:   No results for input(s): CULTURE in the last 72 hours. Radiology reports as per the Radiologist  Radiology: No results found. ASSESSMENT AND PLAN:  #1.      Roz Zhang is a [de-identified] y.o.  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 · CKD and chemotherapy associated ANEMIA, previously on Procrit, currently on hold having completed chemotherapy with further improvement in hemoglobin. · BPH on Flomax  · Orthostatic hypotension medications managed by Dr. Rachel Stewart    He completed 4 cycles of combination chemotherapy with carboplatin, Keytruda, Alimta on 7/5/2019.    He was then changed to maintenance therapy with Keytruda and Alimta every 3 weeks. Prabhjot received his final cycle #25 of Keytruda/Alimta on 10/29/2020. Treatment was held since that time due to issues of dyspnea and shortness of breath, requiring steroids. Robbie Lopez presents today for continued monitoring and review of recent chest x-ray requested to be done for today's visit. 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   · CKD associated anemia responding to Procrit.     · Kunal Quintana has benign prostatic hypertrophy (BPH)  that is stable on Flomax. · Orthostatic hypotension resolved with adjustment of metoprolol by Dr. Gertrudis Elaine   · He takes Tambocor, metoprolol without new cardiac issues. · Protonix continues without any new exacerbations of GERD. · Lower extremity edema overall has actually improved to some degree     Not able to get his breath completely. He uses supplemental oxygen sparingly. Sheridan Araujo feels remarkably well, he still has issues of dyspnea on moderate exertion but less so than previously. Cardiac medications have been adjusted to reduce potential for pulmonary fibrosis. He is pleased with the way things are going. He has right leg discomfort but otherwise no new complaints. Physical examination today, 6/29/2022:    No evidence of palpable peripheral lymphadenopathy. Chronic rales bilaterally are noted in upper and lower lung fields anteriorly and posteriorly. Heart is regular normal S1 and S2, no tachycardia. Abdominal exam is benign. Neurological exam is intact with intact mental status and nonfocal neurological exam.  Brief cranial nerves exam are all intact. CBC today 6/29/2022  reveals a WBC of 6.43  Hgb is 14.3 with an MCV of 95.7 and platelet count of 223,970 .       CT CHEST WO CONTRAST DIAGNOSTIC at [facility]- 11/15/2021:Comparison: CT chest without contrast 8/25/2021  · 3.0 x 2.2 cm spiculated mass in the right upper lobe, stable  · There is pleural thickening lateral to the mass with bands of probable fibrosis, stable   · 4 mm RUL there is a small stellate shaped nodule, previously 5 mm  · 3 mm nodule in the right lower lobe posterior to the major fissure, unchanged  · There is a calcified granuloma in the medial basal segment left lower lobe   · Small calcified pleural plaques in the upper left hemithorax as before. · There are several partially calcified mediastinal lymph nodes which appears stable  · There is increased pleural thickening along the anterior margin of the right lower lobe. CT ABDOMEN PELVIS WO CONTRAST at [facility]- 11/15/2021:Comparison: CT abdomen pelvis without contrast 3/17/2021  · Review of lung windows demonstrates extensive reticular interstitial fibrosis in the lower lung zones, as well as loculated pleural fluid in the right lateral lung base with pleural thickening   · 10 mm.hyperattenuating exophytic nodule at the inferior pole the left kidney   · 7 mm nephrolithiasis at the inferior pole the left kidney. · The prostate gland is enlarged  · There is grade 1 spondylolisthesis at L5-S1 with disc space collapse. This is stable      XR CHEST (2 VW)  on 3/3/2022 at [facility], compared to 8/16/21, documented:  · A 1.7 cm opacity in the right midlung zone appears smaller on the current study, previously measuring 2.5 cm. · Stable blunting of the costophrenic angles right greater than left. · Coarse interstitial lung disease appears relatively stable. Probable interstitial fibrosis      X-RAYS OF THE CHEST at [facility] on 6/29/2022:  · The lungs show moderately severe fibrosis and COPD with pleural  reaction blunting the right costophrenic sulcus  · Small area consolidation noted right upper lobe    Examination is completely unremarkable for new problems. He continues to use oxygen however sparingly and only in the evenings. He is now driving himself back and forth to the doctor's office and wherever he needs to go.     I will see Екатерина Rocky Estrada back in follow-up in 3 months with a chest x-ray prior to his return, CT scans have been scheduled for November 2022 at [facility] at his request.    #2   Anemia of chronic renal failure and chemotherapy associated anemia    Procrit 20,000 units weekly for Hgb < 11.0  is given as indicated to decrease transfusion requirements. CBC on 3/24/2021 revealed a WBC of 7.93. Hgb is 11.8 with an MCV of 97.3  and platelet count of 466,102 . Procrit is NOT indicated today. He will return monthly for CBC checks and Procrit as indicated for CKD associated anemia        CBC on 6/16/2021  revealed a WBC of 6.04 . Hgb is 13.0  with an MCV of 97.2 and platelet count of 957,254 . Procrit NOT indicated. CBC 9/15/2021  revealed a WBC of 6.01 Hgb is 12.8 with an MCV of 98.8 and platelet count of 705,629. CBC 12/15/2021 revealed a WBC of 5.70  Hgb is 14.9 with an MCV of 93.5 and platelet count of 937,850 . CBC 3/16/2022 revealed a WBC of 5.79 Hgb is 13.7 with an MCV of  94.5 and platelet count of 318,154. CBC today 6/29/2022  reveals a WBC of 6.43 Hgb is 14.3 with an MCV of 95.7 and platelet count of 008,521. I suspect he improved performance status has something to do with his hemoglobin 12.8 causing him less dyspnea on exertion and giving him a bit more stamina. Being off of chemotherapy for a year has been very beneficial to him. #3  TUMOR SCREENING AND HEALTH MAINTENANCE    GI cancer screening  Colonoscopy on 1/13/2019 by Dr. Mirian Luu. Recall in 5 years    Prostate cancer screening    #4  Immunizations:  Immunization History   Administered Date(s) Administered    COVID-19, MODERNA BLUE border, Primary or Immunocompromised, (age 12y+), IM, 100 mcg/0.5mL 04/05/2021, 05/03/2021                     Herschel Meigs was seen today for follow-up. Diagnoses and all orders for this visit:    Non-small cell cancer of right lung (HCC)  -     XR CHEST STANDARD (2 VW);  Future         Orders Placed This Encounter   Procedures  XR CHEST STANDARD (2 VW)     Standing Status:   Future     Standing Expiration Date:   6/29/2023     Order Specific Question:   Reason for exam:     Answer:   Monitor lung cancer           Return in about 3 months (around 9/29/2022) for Follow Up with Yovana Art

## 2022-06-29 ENCOUNTER — HOSPITAL ENCOUNTER (OUTPATIENT)
Dept: INFUSION THERAPY | Age: 82
Discharge: HOME | End: 2022-06-29
Payer: MEDICARE

## 2022-06-29 ENCOUNTER — OFFICE VISIT (OUTPATIENT)
Dept: HEMATOLOGY | Age: 82
End: 2022-06-29
Payer: MEDICARE

## 2022-06-29 ENCOUNTER — HOSPITAL ENCOUNTER (OUTPATIENT)
Dept: GENERAL RADIOLOGY | Age: 82
Discharge: HOME | End: 2022-06-29
Payer: MEDICARE

## 2022-06-29 VITALS
DIASTOLIC BLOOD PRESSURE: 82 MMHG | OXYGEN SATURATION: 94 % | WEIGHT: 181.1 LBS | SYSTOLIC BLOOD PRESSURE: 138 MMHG | HEIGHT: 64 IN | BODY MASS INDEX: 30.92 KG/M2 | HEART RATE: 74 BPM

## 2022-06-29 DIAGNOSIS — C34.91 NON-SMALL CELL CANCER OF RIGHT LUNG (HCC): Primary | ICD-10-CM

## 2022-06-29 DIAGNOSIS — Z45.2 ENCOUNTER FOR CENTRAL LINE CARE: ICD-10-CM

## 2022-06-29 DIAGNOSIS — R91.1 LUNG NODULE: ICD-10-CM

## 2022-06-29 DIAGNOSIS — C34.90 NON-SMALL CELL LUNG CANCER, UNSPECIFIED LATERALITY (HCC): Primary | ICD-10-CM

## 2022-06-29 DIAGNOSIS — C34.90 NON-SMALL CELL LUNG CANCER, UNSPECIFIED LATERALITY (HCC): ICD-10-CM

## 2022-06-29 LAB
HCT VFR BLD CALC: 46.7 % (ref 40.1–51)
HEMOGLOBIN: 14.3 G/DL (ref 13.7–17.5)
LYMPHOCYTES ABSOLUTE: 0.96 K/UL (ref 1.18–3.74)
LYMPHOCYTES RELATIVE PERCENT: 14.9 % (ref 19.3–53.1)
MCH RBC QN AUTO: 29.3 PG (ref 25.7–32.2)
MCHC RBC AUTO-ENTMCNC: 30.6 G/DL (ref 32.3–36.5)
MCV RBC AUTO: 95.7 FL (ref 79–92.2)
MONOCYTES ABSOLUTE: 0.64 K/UL (ref 0.24–0.82)
MONOCYTES RELATIVE PERCENT: 10 % (ref 4.7–12.5)
NEUTROPHILS ABSOLUTE: 4.64 K/UL (ref 1.56–6.13)
NEUTROPHILS RELATIVE PERCENT: 72.1 % (ref 34–71.1)
PDW BLD-RTO: 14.7 % (ref 11.6–14.4)
PLATELET # BLD: 146 K/UL (ref 163–337)
PMV BLD AUTO: 11.5 FL (ref 7.4–10.4)
RBC # BLD: 4.88 M/UL (ref 4.63–6.08)
WBC # BLD: 6.43 K/UL (ref 4.23–9.07)

## 2022-06-29 PROCEDURE — 2580000003 HC RX 258: Performed by: INTERNAL MEDICINE

## 2022-06-29 PROCEDURE — 6360000002 HC RX W HCPCS: Performed by: INTERNAL MEDICINE

## 2022-06-29 PROCEDURE — 85025 COMPLETE CBC W/AUTO DIFF WBC: CPT

## 2022-06-29 PROCEDURE — 99212 OFFICE O/P EST SF 10 MIN: CPT

## 2022-06-29 PROCEDURE — 71046 X-RAY EXAM CHEST 2 VIEWS: CPT

## 2022-06-29 PROCEDURE — 99214 OFFICE O/P EST MOD 30 MIN: CPT | Performed by: INTERNAL MEDICINE

## 2022-06-29 PROCEDURE — 1036F TOBACCO NON-USER: CPT | Performed by: INTERNAL MEDICINE

## 2022-06-29 PROCEDURE — G8417 CALC BMI ABV UP PARAM F/U: HCPCS | Performed by: INTERNAL MEDICINE

## 2022-06-29 PROCEDURE — 96523 IRRIG DRUG DELIVERY DEVICE: CPT

## 2022-06-29 PROCEDURE — 71046 X-RAY EXAM CHEST 2 VIEWS: CPT | Performed by: RADIOLOGY

## 2022-06-29 PROCEDURE — G8427 DOCREV CUR MEDS BY ELIG CLIN: HCPCS | Performed by: INTERNAL MEDICINE

## 2022-06-29 PROCEDURE — 1123F ACP DISCUSS/DSCN MKR DOCD: CPT | Performed by: INTERNAL MEDICINE

## 2022-06-29 RX ORDER — SODIUM CHLORIDE 0.9 % (FLUSH) 0.9 %
10 SYRINGE (ML) INJECTION PRN
Status: DISCONTINUED | OUTPATIENT
Start: 2022-06-29 | End: 2022-06-30 | Stop reason: HOSPADM

## 2022-06-29 RX ORDER — SODIUM CHLORIDE 0.9 % (FLUSH) 0.9 %
10 SYRINGE (ML) INJECTION PRN
Status: CANCELLED | OUTPATIENT
Start: 2022-06-29

## 2022-06-29 RX ORDER — HEPARIN SODIUM (PORCINE) LOCK FLUSH IV SOLN 100 UNIT/ML 100 UNIT/ML
500 SOLUTION INTRAVENOUS PRN
Status: CANCELLED | OUTPATIENT
Start: 2022-06-29

## 2022-06-29 RX ORDER — SODIUM CHLORIDE 0.9 % (FLUSH) 0.9 %
20 SYRINGE (ML) INJECTION PRN
Status: CANCELLED | OUTPATIENT
Start: 2022-06-29

## 2022-06-29 RX ORDER — HEPARIN SODIUM (PORCINE) LOCK FLUSH IV SOLN 100 UNIT/ML 100 UNIT/ML
500 SOLUTION INTRAVENOUS PRN
Status: DISCONTINUED | OUTPATIENT
Start: 2022-06-29 | End: 2022-06-30 | Stop reason: HOSPADM

## 2022-06-29 RX ADMIN — HEPARIN 500 UNITS: 100 SYRINGE at 12:01

## 2022-06-29 RX ADMIN — SODIUM CHLORIDE, PRESERVATIVE FREE 10 ML: 5 INJECTION INTRAVENOUS at 12:01

## 2022-06-29 RX ADMIN — HEPARIN 500 UNITS: 100 SYRINGE at 12:00

## 2022-06-29 RX ADMIN — SODIUM CHLORIDE, PRESERVATIVE FREE 10 ML: 5 INJECTION INTRAVENOUS at 12:00

## 2022-07-05 RX ORDER — METOPROLOL SUCCINATE 25 MG/1
TABLET, EXTENDED RELEASE ORAL
Qty: 45 TABLET | Refills: 3 | Status: SHIPPED | OUTPATIENT
Start: 2022-07-05

## 2022-09-21 ENCOUNTER — OFFICE VISIT (OUTPATIENT)
Dept: CARDIOLOGY CLINIC | Age: 82
End: 2022-09-21
Payer: MEDICARE

## 2022-09-21 VITALS
DIASTOLIC BLOOD PRESSURE: 82 MMHG | BODY MASS INDEX: 30.73 KG/M2 | WEIGHT: 180 LBS | SYSTOLIC BLOOD PRESSURE: 112 MMHG | HEART RATE: 72 BPM | HEIGHT: 64 IN | OXYGEN SATURATION: 95 %

## 2022-09-21 DIAGNOSIS — I10 ESSENTIAL HYPERTENSION: Primary | ICD-10-CM

## 2022-09-21 DIAGNOSIS — I42.8 NONISCHEMIC CARDIOMYOPATHY (HCC): ICD-10-CM

## 2022-09-21 DIAGNOSIS — I47.29 NSVT (NONSUSTAINED VENTRICULAR TACHYCARDIA): ICD-10-CM

## 2022-09-21 PROCEDURE — G8427 DOCREV CUR MEDS BY ELIG CLIN: HCPCS | Performed by: NURSE PRACTITIONER

## 2022-09-21 PROCEDURE — G8417 CALC BMI ABV UP PARAM F/U: HCPCS | Performed by: NURSE PRACTITIONER

## 2022-09-21 PROCEDURE — 1036F TOBACCO NON-USER: CPT | Performed by: NURSE PRACTITIONER

## 2022-09-21 PROCEDURE — 99214 OFFICE O/P EST MOD 30 MIN: CPT | Performed by: NURSE PRACTITIONER

## 2022-09-21 PROCEDURE — 1123F ACP DISCUSS/DSCN MKR DOCD: CPT | Performed by: NURSE PRACTITIONER

## 2022-09-21 RX ORDER — SPIRONOLACTONE 25 MG/1
25 TABLET ORAL 2 TIMES DAILY
COMMUNITY

## 2022-09-21 ASSESSMENT — ENCOUNTER SYMPTOMS
CHEST TIGHTNESS: 0
SORE THROAT: 0
COUGH: 0
SHORTNESS OF BREATH: 1
WHEEZING: 0

## 2022-09-21 NOTE — PROGRESS NOTES
[facility] Cardiology   Established Patient Office Visit   [facility] [address] [facility]  [phone]        OFFICE VISIT:  2022    Kendall Amaya - : 1940    Reason For Visit:  Manda Jordan is a [de-identified] y.o. male who is here for 6 Month Follow-Up    1. Essential hypertension    2. Nonischemic cardiomyopathy ([address]    3. NSVT (nonsustained ventricular tachycardia) (HCC)      Patient with a history of mild coronary artery disease, nonsustained ventricular tachycardia during an EGD, nonischemic cardiomyopathy, stage IV metastatic adenocarcinoma of the lung on maintenance therapy and hypertension. Patient was seen in our office on 2021 with an elevated blood pressure. Patient was given digital blood pressure cuff and was instructed to keep home blood pressure logs. Patient was started on losartan 50 mg by his kidney doctor. In addition to being on the Toprol-XL 12.5 mg daily. Patient presented to clinic on 2021 for blood pressure evaluation. Patient denied any complaints. Blood pressure well controlled then 118/80. Patient presents to clinic today for routine follow-up. He denies any complaints of chest pain, pressure or tightness. There is some shortness of breath which is his baseline no worsening. There is no orthopnea or PND. Patient denies any lightheadedness, dizziness or syncope.           Subjective    Kendall Amaya is a [de-identified] y.o. male with the [facility]    Patient Active Problem List    Diagnosis Date Noted    Encounter for central line care 2022    Hypoxia 2020    Fatigue associated with anemia 2020    Symptomatic anemia 2020    Monoclonal gammopathy     Other iron deficiency anemias     Chronic kidney disease, stage IV (severe) (HCC)     Anemia, unspecified     Malignant neoplasm (HCC)     Secondary malignant neoplasm of other digestive organs (HCC)     Abnormal weight loss     Other fatigue     Cellulitis 10/2/2017    Eye injury     at age 12 from penetrating injury    Gout     HTN (hypertension)     Hydronephrosis     Hydronephrosis, left     Liver abscess     resolved    Lung nodule     Malignant neoplasm ([address]     Malignant neoplasm of upper lobe, right bronchus or lung (HCC)     MGUS (monoclonal gammopathy of unknown significance)     secondary to an IgG kappa. IgG level in 2,000 range    Monoclonal gammopathy     NICM (nonischemic cardiomyopathy) (HCC)     Non-small cell lung cancer ([address] 2019    Non-sustained ventricular tachycardia (HCC)     NSVT (nonsustained ventricular tachycardia) (HCC)     Osteoarthritis     Other fatigue     Other iron deficiency anemias     Secondary malignant neoplasm of other digestive organs ([address]     Weight loss      Past Surgical History:   Procedure Laterality Date    BRONCHOSCOPY  2018    dx of adenocarcinoma RUL  Dr. Papa Gentile, COLON, DIAGNOSTIC  2019    aborted d/t vtach    EYE SURGERY Left     EYE SURGERY  1964    FOOT SURGERY      removal of joint from right toe from drilling accident, [address] [place]  10/29/2003    with resection  Mitul-en-Y proecedure  DR. Mcfarlane    TUNNELED VENOUS PORT PLACEMENT      UPPER GASTROINTESTINAL ENDOSCOPY N/A 3/13/2019    EGD W/EUS FNA performed by Corry Flores MD at [facility] Endoscopy     Family History   Problem Relation Age of Onset    Osteoporosis Mother     High Blood Pressure Mother     Asthma Mother     Bleeding Prob Father     Cancer Father         leukemia    Asthma Brother      Social History     Tobacco Use    Smoking status: Former     Packs/day: 2.00     Years: 40.00     Pack years: 80.00     Types: Cigarettes     Quit date: 10/29/2003     Years since quittin.9    Smokeless tobacco: Never   Substance Use Topics    Alcohol use: No          Review of Systems:    Review of Systems   Constitutional: Negative for activity change, chills, diaphoresis, fatigue and fever. HENT:  Negative for congestion and sore throat. Respiratory:  Positive for shortness of breath. Negative for cough, chest tightness and wheezing. Cardiovascular:  Negative for chest pain, palpitations and leg swelling. Neurological:  Negative for dizziness, syncope and headaches. Psychiatric/Behavioral:  Negative for confusion. The patient is not nervous/anxious. Objective:    /82   Pulse 72   Ht 5' 4\" (1.626 m)   Wt 180 lb (81.6 kg)   SpO2 95%   BMI 30.90 kg/m²     GENERAL - well developed and well nourished, conversant  HEENT -  PERRLA, Hearing appears normal, gentleman lids are normal.  External inspection of ears and nose appear normal.  NECK - no thyromegaly, no JVD, trachea is in the midline  CARDIOVASCULAR - PMI is in the mid line clavicular position, Normal S1 and S2 with no systolic murmur. No S3 or S4    PULMONARY - no respiratory distress. No wheezes or rales. Lungs are clear to ausculation, normal respiratory effort. ABDOMEN  - soft, non tender, no rebound  MUSCULOSKELETAL  - range of motion of the upper and lower extermites appears normal and equal and is without pain   EXTREMITIES - no significant edema   NEUROLOGIC - gait and station are normal  SKIN - turgor is normal, can warm and dry. PSYCHIATRIC - normal mood and affect, alert and orientated x 3,      ASSESSMENT:    ALL THE CARDIOLOGY PROBLEMS ARE LISTED ABOVE; HOWEVER, THE FOLLOWING SPECIFIC CARDIAC PROBLEMS / CONDITIONS WERE ADDRESSED AND TREATED DURING THE OFFICE VISIT TODAY:                                                                                            MEDICAL DECISION MAKING             Cardiac Specific Problem / Diagnosis  Discussion and Data Reviewed Diagnostic Procedures Ordered Management Options Selected           1.  Hypertension  Well Controlled   Review and summation of old records:    Blood pressure in the office today 112/82 O2 sat 95%. Patient is on losartan 50 mg daily. Toprol-XL 12.5 mg daily No Continue current medications:     Yes           2. Nonischemic cardiomyopathy  Stable   No overt signs of failure. Patient is on losartan and Aldactone. No Continue current medications:    Yes           3. Nonsustained ventricular tachycardia Well Controlled No reoccurrence. Patient is on Toprol-XL 12.5 mg daily. Patient states he is noted lower heart rate with this and would like to try going to every other day. Patient to trial this for a week and contact our office to let us know what his vital signs are and how he is feeling. No Continue current medications: Yes                     No orders of the defined types were placed in this encounter. No orders of the defined types were placed in this encounter. Discussed with patient. Return in about 6 months (around 3/21/2023) for Keisha with me . I greatly appreciate the opportunity to meet Deja Hall and your confidence in allowing me to participate in his cardiovascular care. ALEX Mcclellan NP  9/21/2022 10:59 AM CDT                    This dictation was generated by voice recognition computer software. Although all attempts are made to edit dictation for accuracy, there may be errors in the transcription that are not intended.

## 2022-09-27 NOTE — PROGRESS NOTES
Patient:  Swathi Rea  YOB: 1940  Date of Service: 9/28/2022  MRN: [id number]    Primary Care Physician: Rolan Clarke MD    Chief Complaint   Patient presents with    Follow-up     Non-small cell cancer of right lung [facility])         Patient Seen, Chart, Consults notes, Labs, Radiology studies reviewed. Subjective:    Swathi Rea is a [de-identified] y.o.  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 CKD and chemotherapy associated ANEMIA, previously on Procrit, currently on hold having completed chemotherapy with further improvement in hemoglobin. BPH on Flomax  Orthostatic hypotension medications managed by Dr. Clive Hunter    He completed 4 cycles of combination chemotherapy with carboplatin, Keytruda, Alimta on 7/5/2019. He was then changed to maintenance therapy with Keytruda and Alimta every 3 weeks. Prabhjot received his final cycle #25 of Keytruda/Alimta on 10/29/2020. Treatment was held since that time due to issues of dyspnea and shortness of breath, requiring steroids. Roman Salinas presents today for continued monitoring, to review the 9/28/2022 chest x-ray requested to be done for today's visit. He has no new specific complaints. 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 Malu Calles has benign prostatic hypertrophy (BPH)  that is stable on Flomax. Orthostatic hypotension resolved with adjustment of metoprolol by Dr. Clive Hunter   He takes Tambocor, metoprolol without new cardiac issues. Protonix continues without any new exacerbations of GERD. Lower extremity edema overall has actually improved to some degree     Not able to get his breath completely. He uses supplemental oxygen sparingly.     Darshan Luu feels remarkably well, he still has issues of dyspnea on moderate exertion but less so than previously. He is not able to get his breath completely. He uses supplemental oxygen sparingly. Cardiac medications have been adjusted to reduce potential for pulmonary fibrosis. He is pleased with the way things are going. He has right leg discomfort but otherwise no new complaints. Eliot Villegas had a chest x-ray on 3/3/2022 and comes for continued monitoring. TUMOR HISTORY: Adenocarcinoma on the RUL of the lung 11/27/2018  Yovani An had CT scans performed for new onset of weight loss of 13 pounds over the previous year , associated with increased fatigue. He denied respiratory symptoms of shortness of air, cough or productive sputum. A CT scan of the chest on 9/12/2018 had been ordered by Dr. Kim Bashir due to weight loss and identified a new lobulated right upper lobe 5.7 cm mass that extended back to the right hilum. Numerous mediastinal lymph nodes ranging in size from mm to 1.3 cm and a subcarnial lymph node that measured 1.5 x 1.5 x 3.5 cm. A CT scan of the abdomen and pelvis with contrast on 9/12/2018 documented a 4.9 x 4 x 4 cm soft tissue mass with peripheral calcification immediately adjacent to the gallbladder in the head of the pancreas area. An MRI of the abdomen and pelvis W & WO on 10/11/2018 identified in the 4.1 x 3.4 cm soft tissue mass in the subhepatic space, abutting the second portion of the duodenum with mild displacement of the duodenum. There does not appear to be involvement of the pancreas, and the pancreas appears within normal limits. Differential diagnoses include a desmoid tumor, less likely leiomyoma of the wall of the duodenum or malignancy. Dr. Corina Barrera requested a CT-guided biopsy of the right upper lobe mass. Dr. Attila Villafuerte, the radiologist, evaluated the area with a repeat CT scan of the chest on 10/11/2018.    He spoke with Dr. Corina Barrera indicating that he would not be able to perform the biopsy because the tumor was not accessible, it was under the scapula , which blocked access and therefore the biopsy procedure was canceled. On the CT scan of the chest on 10/11/2018 measurements of the RUL lung mass was 5.7 x 3.2 cm with irregular margins suspicious for a primary lung neoplasm. Soft tissue associated with the tumor appeared to extend into the bronchus supplying this portion of the RUL of the lung. Dr. Agustin Antonio indicated that the mass had an endobronchial component and should be easily assessable via bronchoscopy. The chest CT also included a portion of the upper abdomen where a soft tissue mass was described in an addendum report. In the subhepatic space measuring 4.0 x 3.9 cm, displacing the second portion of the duodenum medially. A referral was made to Dr. Cote for consideration for bronchoscopy for biopsy. On 10/24/2018, Dr. Marya South performed a bronchoscopy with EBUS guided biopsy of a 3 cm subcarinal lymph node along with bronchoalveolar lavage of the posterior segment of the RUL of the lung. The final pathology of the station 7 lymph node only revealed a background of small mature lymphocytes with clusters of histiocytes suggestive of granuloma. Negative for malignant cells. Kinyoun and GMS stains were negative for AFB and fungal organisms respectively. The bronchial washings were negative for malignant cells as well. Mr. Yoli Vines was referred to Dr. Rickie Gonzalez at [facility] in [place], [place], for navigational bronchoscopy and biopsy under ultrasound. On 11/27/2018 Dr. Rickie Gonzalez performed a bronchoscopy, navigational protocol, endobronchial ultrasound and biopsy of the RUL of the lung mass along with multiple lymph node station Biopsies. Pathology revealed the following:  Poorly differentiated Adenocarcinoma from the RUL of the lung mass on biopsy and bronchoalveolar lavage consistent with a lung primary.    All lymph nodes sampled were NEGATIVE for malignant cells including stations 10L, 4L, station 7, 10R, 4R. IHC studies were positive for CK7, TTF-1 and Napsin A. IHC studies on tumor cells were negative for CDX2, CK5/6, and p63   Further lung profile molecular testing is pending     All of the above was discussed at length with Mr. Kim Tidwell at his 12/13/2018 clinic visit. He was agreeable for referral for consideration of resection of the lung lesion. He is comfortable with and would like a referral to Dr. Cyndia Cushing ([phone]) at [place] [address], [address] [place] [address], [address] [place] [address]. Logistics, however, are planning a big part in his decision making and he may decide to have surgery done in the [place] area. [facility] referral will be made to Dr. Mónica Marmolejo. CT chest with contrast 2/18/2019 [facility] compared to 9/12/2018 revealed a 4.9 x 3.5 cm spiculated RUL lung mass which actually decreased in size from a prior value of 6.1 x 3.6 cm. Subhepatic mass adjacent to the descending duodenum had increased in size significantly to 4.3 x 9 cm compared to 4.1 x 3.4 cm on the 10/11/18 MRI of the abdomen. CT of the abdomen and pelvis without contrast on 3/20/2019 at [facility] was compared to outpatient CT data and pelvis on 9/12/2018 an MRI of the abdomen from 10/11/2018. A soft tissue mass was again encountered in the subhepatic space which abutted the distal stomach and proximal duodenum measuring 8.9 x 5.4 cm which has increased considerably from a prior measurement of 4.1 x 3.4 cm. Medial to the left renal hilum a 3.5 cm lobular mass causing some mild left-sided hydronephrosis. Mr. Kim Tidwell was referred to Dr. Mónica Marmolejo who saw him on 1/22/2019 anticipating plans for a curative resection.       Dr. Pat Kovacs received a phone call on 2/20/2019 from Dr. Gem Melara , indicating that the previously documented an abdominal pancreatic area mass had doubled in size and was causing compression into the left kidney/renal pelvis producing a hydroureter. Dr. Mihir Rosa arranged a referral to Dr. Ad Cade who will be placing a stent 3/8/2019. Mr. Tonya Suarez was scheduled to be seen by gastroenterology at [facility] on 3/13/2019 for EGD/EUS assessment and biopsy of the enlarging peripancreatic/duodenal area mass. With a decrease in the lung mass and increased size of the subhepatic mass-surgical resection was abandoned at present pending further evaluation of the subhepatic mass. Dr. Valeriy Choudhary discussed treatment options with the unresectable lung mass and the per he pancreatic mass and recommended a combination of Keytruda, Alimta, carboplatin. He was subsequently admitted to [facility] 4/26 - 4/30/2019 with abdominal pain and melena. WBC fantasma was 0.75 with ANC 0.34. PLT did drop to 24,000. He did have duodenal ulcerations that were bleeding on endoscopy. He was stabilized but then readmitted from 5/8 - 5/10/2019 with recurrent melanoma. A repeat endoscopy was performed. It was felt that exacerbation of his bleeding from the duodenal came about by his thrombocytopenia from treatment. Subsequent dosing was adjusted and Neulasta was added. CT chest without contrast at [facility] on 6/27/2019 was compared to 2/18/2019 revealed that the right lung mass that extended into the right hilum has decreased from 5.2 x 3.5 down to 4.6 x 3.6. There is increased central cavitation in the mass consistent with tumor necrosis. Mediastinal lymphadenopathy is relatively stable. CT abdomen and pelvis without contrast at [facility] on 6/27/2019 was compared to 4/26/2019 revealed complete resolution of the previously identified duodenal/subhepatic space soft tissue mass. The previously identified heterogenous enhancing lesion in the left renal pelvis was much less prominent but there does continue to be some mild left caliectasis.     I reviewed the CT results with Dr. Valeriy Choudhary on 7/5/2019 who then relayed them as well to [facility]. We've had excellent results from this combination of therapy. He completed the fourth combination therapy of Opal Barrs on 7/5/2019 and then transitioned to maintenance Keytruda plus Alimta. CT chest without contrast at [facility] on 1/9/2020 was compared to 10/17/2019 revealing:   A stable spiculated RUL nodule/mass with similar mediastinal adenopathy. Questionable right hilar adenopathy with diminished assessment due to lack of IV contrast.    Advanced emphysema with severe pulmonary fibrosis. No new pulmonary nodules. CT abdomen and pelvis without contrast at [facility] on 1/9/2020 was compared to 10/17/2019 revealing:   Mildly prominent aortocaval RP lymph nodes with position just below the level of the renal vein worrisome for potential lymphatic metastases. This is similar to 10/17/2019 but increased from 4/26/2019. Pneumobilia without discrete suspicious focal lesion in the liver. Wall thickening of the duodenum. Similar and stable renal lesions favoring cysts. He has had numerous endoscopies within the past year. While he was having an Endo EUS and had cardiac issues and was actually admitted to the intensive care unit. CT abdomen and pelvis without contrast on 7/16/2020 at [facility] was compared to 1/9/2020 and documented:  1.5 x 0.9 cm aortocaval lymph node, previously 1.9 x 1.2 cm  No new abnormality seen    CT chest without contrast on 11/12/2020 at [facility] ws compared to 1/9/2020 and documented:  Mixed response therapy with interval decrease in size in the RIGHT upper lobe pulmonary nodule but increase in size and mediastinal lymph nodes. In addition, there is severe emphysema with findings of severe pulmonary fibrosis. Interval worsening of interstitial opacities bilaterally as well as suggestion of some pleural nodularity. Lymphangitic spread of malignancy should be considered.   Small pleural effusion on the RIGHT is new     CT abdomen and pelvis without contrast on 11/12/2020 at [facility] was compared to 7/16/2020 and documented: The aortic caval node described on the comparison study is not significantly changed in size when measured at the same location. Nonobstructing renal calculus on the LEFT. No change in the indeterminate renal lesions on the LEFT, likely cysts. 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 Vishal Cui has benign prostatic hypertrophy (BPH)  that is stable on Flomax. Orthostatic hypotension resolved with adjustment of metoprolol by Dr. Vida Baer   He takes Tambocor, metoprolol without new cardiac issues. Protonix continues without any new exacerbations of GERD. Lower extremity edema overall has actually improved to some degree. Prabhjot received his final cycle #25 of Keytruda/Alimta on 10/29/2020. Treatment was held since that time due to issues of dyspnea and shortness of breath. A course of steroids (prednisone) was initiated and antibiotics also delivered and although his respiratory issues improved, he has continued to have issues with weight loss. CXR on 12/17/2020 documented no interval change, but in my personal review of the x-ray, there is significant pulmonary fibrosis not significantly changed compared to the x-ray from October 2020. I discussed the findings on the chest x-ray and the comparison at his clinic visit on 12/30/2020. He does not appear to be improving but rather quite stable. Joanna French has pulmonary fibrosis as documented on a CT scan of the chest without contrast at [facility] on 11/12/2020.   Vishal Cui is in agreement to go on a chemotherapy holiday with a repeat CT scan of the chest in 3 months and monitor his situation clinically and radiographically without systemic intervention going forward. CT CHEST WO  contrast on 3/17/2021, compared to 11/12/2020 at [facility] documented:   Mixed response therapy with interval decrease in size in the RIGHT upper lobe pulmonary nodule but increase in size and mediastinal lymph nodes. Slight interval decrease in size in the RIGHT upper lobe nodule/mass measuring 4.7 x 2.2 cm today, previously 5.0 x 2.4 cm. RIGHT apical nodular density measuring up to 0.6 cm, previously 0.4 cm. In addition, there is severe emphysema with findings of severe pulmonary fibrosis. Interval worsening of interstitial opacities bilaterally as well as suggestion of some pleural nodularity. Lymphangitic spread of malignancy should be considered. Small pleural effusion on the RIGHT is new. CT ABD & PELVIS  WO contrast on 3/17/2021, compared to 11/12/2020,  at [facility] documented:  Nonobstructing calculus lower pole left kidney  Low-density lesions associated with the left renal contour probably proteinaceous cyst these are unchanged  Chronic right lateral pleural complex in the lung base with bilateral small basilar pneumothoraces. .     XR CHEST (2 VW) 4/21/2021 at [facility] , compared to December 17, 2020, documented:  Advanced interstitial fibrotic changes noted throughout the pulmonary parenchyma unchanged from December 17, 2020. Small to moderate right lateral pleural complex. This may be pleural fluid or thickening is unchanged December 17, 2020. XR CHEST (2 VW)  6/16/2021:  2.5 cm area of residual nodularity versus scarring in the RUL zone. Probable small pleural effusions. Bilateral interstitial infiltrates stable compared to the 2 most recent studies but increased compared to the 2019 study. There may beunderlying pulmonary fibrosis that is worsening. Pulmonary edema is not ruled out.       CT CHEST WO CONTRAST DIAGNOSTIC at [facility]- 11/15/2021:Comparison: CT chest without contrast 8/25/2021  3.0 x 2.2 cm spiculated mass in the right upper lobe, stable  There is pleural thickening lateral to the mass with bands of probable fibrosis, stable   4 mm RUL there is a small stellate shaped nodule, previously 5 mm  3 mm nodule in the right lower lobe posterior to the major fissure, unchanged  There is a calcified granuloma in the medial basal segment left lower lobe   Small calcified pleural plaques in the upper left hemithorax as before. There are several partially calcified mediastinal lymph nodes which appears stable  There is increased pleural thickening along the anterior margin of the right lower lobe. CT ABDOMEN PELVIS WO CONTRAST at [facility]- 11/15/2021:Comparison: CT abdomen pelvis without contrast 3/17/2021  Review of lung windows demonstrates extensive reticular interstitial fibrosis in the lower lung zones, as well as loculated pleural fluid in the right lateral lung base with pleural thickening   10 mm.hyperattenuating exophytic nodule at the inferior pole the left kidney   7 mm nephrolithiasis at the inferior pole the left kidney. The prostate gland is enlarged  There is grade 1 spondylolisthesis at L5-S1 with disc space collapse. This is stable    XR CHEST (2 VW)  on 3/3/2022 at [facility], compared to 8/16/21, documented:  A 1.7 cm opacity in the right midlung zone appears smaller on the current study, previously measuring 2.5 cm. Stable blunting of the costophrenic angles right greater than left. Coarse interstitial lung disease appears relatively stable. Probable interstitial fibrosis      TREATMENT SUMMARY  Keytruda, carboplatin, Alimta initiated 4/18/2019 to be given every 3 weeks for 4 cycles - 4th cycle 7/5/2019, then Keytruda + Alimta as maintenance to continue indefinitely until progression or intolerable side effects   Prabhjot received cycle #25 of Ange Franco on 10/29/2020.   He was then placed on an indefinite chemotherapy holiday on 12/30/2020         #2 TUMOR HISTORY: Pancreatic mass diagnosed 10/29/03   Stella Pan presented in October 2003 with obstructive jaundice. A CT scan of the abdomen on 10/26/2003 documented a 3.2 x 4 x 4.2 cm mass in the head of the pancreas. On 10/29/03 Dr. Manohar Butcher performed a resection of a portion of the head of the pancreas on [facility] along with a Mitul-en-Y procedure and a choledochojejunostomy for what was felt to be a pancreatic cancer. His pancreas was bypassed because of the findings. Pathology of the biopsies were negative for malignancy showing a fibrosed pancreas. Mr. Stella Pan was referred to Dr. Noah Quick in [place]. Dr. Noah Quick performed ERCP with an EUS and biopsy on 02/26/04   Pathology again was negative for cancer or malignancy. Routine periodic serologic and radiographic monitoring has not disclosed progression of the mass or development of new malignancy or increase in pancreatic tumor markers. A CT scan of the abdomen and pelvis with contrast on 9/12/2018 documented a 4.9 x 4 x 4 cm soft tissue mass with peripheral calcification immediately adjacent to the gallbladder in the head of the pancreas area. An MRI of the abdomen and pelvis W & WO on 10/11/2018 identified in the 4.1 x 3.4 cm soft tissue mass in the subhepatic space, abutting the second portion of the duodenum with mild displacement of the duodenum. There does not appear to be involvement of the pancreas, and the pancreas appears within normal limits. Differential diagnoses include a desmoid tumor, less likely leiomyoma of the wall of the duodenum or malignancy. CT of the abdomen and pelvis without contrast on 3/20/2019 at [facility] was compared to outpatient CT data and pelvis on 9/12/2018 an MRI of the abdomen from 10/11/2018. A soft tissue mass was again encountered in the subhepatic space which abutted the distal stomach and proximal duodenum measuring 8.9 x 5.4 cm which has increased considerably from a prior measurement of 4.1 x 3.4 cm.  Medial to the left renal hilum a 3.5 cm lobular mass causing some mild left-sided hydronephrosis. Mr. Stella Pan was scheduled for an EGD/EUS by Dr. Fernie Gomez as an outpatient procedure on 3/13/2019 for assessment and biopsy of the enlarging peripancreatic/duodenal area mass. Unfortunately, after initiation of the procedure, he began having ventricular tachycardia and the procedure had to be aborted. Manda Jordan was transferred to the ICU for cardiology evaluation and stabilization. He remained hospitalized until 3/18/2019 when he was medically cleared for discharge. 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 No emergent urologic intervention was warranted and he received monitoring of renal function. He had a creatinine level of 1.9 at discharge. PET scan on 4/2/2019 identified a soft tissue mass in the RUL measuring 1.2 x 3.5 cm with extension to the right anterior hilum with an SUV of 10.1. Evidence of mediastinal and hilar lymphadenopathy, with low-level metabolic activity. Interval increase in the size of a large mass in the right upper abdomen inseparable from the duodenum measuring 10.7 x 6.9 cm compared to 5.4 x 8.9 cm on 2/20/2019 with an SUV of 23.6. Slight increase in 2 small inseparable masses medial to the hilum of the left kidney measuring 2.2 and 2.6 cm and the other measuring 3.9 cm with a maximum SUV of 18.6. Cycle #1 of palliative chemotherapy with Carboplatin, Alimta and Keytruda was initiated 4/18/19 which should also cover this process. Abdominal/pelvic CT 4/26/19 was performed at [facility] due to abdominal pain and melena. The RUQ mass, within the duodenum decreased to 6.8 x 6.2 cm (was 10.7 x 6.9 cm on PET 4/2/19)     CT abdomen and pelvis without contrast at [facility] on 6/27/2019 was compared to 4/26/2019 revealed complete resolution of the previously identified. Duodenal/subhepatic space soft tissue mass.  The previously identified heterogenous enhancing lesion in the left renal pelvis was much less prominent but there does continue to be some mild left caliectasis. CT chest without contrast at [facility] on 1/9/2020 was compared to 10/17/2019 revealing:   A stable spiculated RUL nodule/mass with similar mediastinal adenopathy. Questionable right hilar adenopathy with diminished assessment due to lack of IV contrast.    Advanced emphysema with severe pulmonary fibrosis. No new pulmonary nodules. CT abdomen and pelvis without contrast at [facility] on 1/9/2020 was compared to 10/17/2019 revealing:   Mildly prominent aortocaval RP lymph nodes with position just below the level of the renal vein worrisome for potential lymphatic metastases. This is similar to 10/17/2019 but increased from 4/26/2019. Pneumobilia without discrete suspicious focal lesion in the liver. Wall thickening of the duodenum. Similar and stable renal lesions favoring cysts. CT ABD & PELVIS  WO contrast on 3/17/2021, compared to 11/12/2020,  at [facility] documented:  Nonobstructing calculus lower pole left kidney  Low-density lesions associated with the left renal contour probably proteinaceous cyst these are unchanged  Chronic right lateral pleural complex in the lung base with bilateral small basilar pneumothoraces. .     He has had numerous endoscopies within the past year. When he was having an Endo EUS he had cardiac issues and required to the intensive care unit. This area will just be monitored on follow-up scans without further elective endoscopic surveillance. .     CT ABDOMEN PELVIS WO CONTRAST at [facility]- 11/15/2021:Comparison: CT abdomen pelvis without contrast 3/17/2021  Review of lung windows demonstrates extensive reticular interstitial fibrosis in the lower lung zones, as well as loculated pleural fluid in the right lateral lung base with pleural thickening   10 mm.hyperattenuating exophytic nodule at the inferior pole the left kidney   7 mm nephrolithiasis at the inferior pole the left kidney. The prostate gland is enlarged  There is grade 1 spondylolisthesis at L5-S1 with disc space collapse. This is stable             #1 HEMATOLOGICAL HISTORY: IgG kappa MGUS- Dx: 02/23/06. During the course of Mr. Dianne Dumont follow up, an abnormally elevated protein was noted on one occasion, which led to workup including quantitative immunoglobulins. An elevated IgG kappa was encountered. This has remained stable in the 2500 range since early 2006. A bone marrow biopsy and aspirate on 02/23/06 was negative with only 4% plasma cells. 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 24 hour urine for immunoelectrophoresis did not reveal an M-spike. Serology studies on 9/18/2018 documented an elevated, stable IgG of 2589 with an M spike of 0.7. Immunofixation continued to reveal IgG monoclonal protein with kappa light chain specificity. #2 HEMATOLOGICAL HISTORY: Iron deficiency anemia, 9/18/2018  Nhung Villagomez had serology on 9/18/2018 that identified iron deficiency anemia. His lab work was obtained at [facility].  An iron level was 12, iron saturation 7%, ferritin 256  Folate >20, and vitamin B12 1044. Dr. Chris Peralta placed Sophia on ferrous sulfate 325 mg daily on 9/25/2018 , but this failed to resolve the anemia. An EGD by Dr. Karthikeyan Anderson on 12/11/2018 documented a normal esophagus, normal stomach and a degree of duodenal deformity. Gastric biopsy was urease negative. Colonoscopy by Dr. Karthikeyan Anderson on 1/9/2019 documented a polyp at 80 cm. Pathology identified a tubular adenoma, negative for high-grade dysplasia. Feraheme administered. Labs on 3/13/2019:  Iron 25   Iron saturation 22%   TIBC 116   Ferritin >2000   Reticulocyte count 0.0578      Haptoglobin 341   Folate >20   Vitamin B12 945   Erythropoietin 13     [facility] on 4/26/2019 with melena and abdominal discomfort.  He was daily      spironolactone (ALDACTONE) 25 MG tablet Take 25 mg by mouth 2 times daily      metoprolol succinate (TOPROL XL) 25 MG extended release tablet Take 1/2 (one-half) tablet by mouth once daily 45 tablet 3    losartan (COZAAR) 50 MG tablet Take 50 mg by mouth daily      melatonin 3 MG TABS tablet Take 10 mg by mouth daily      acetaminophen (TYLENOL) 500 MG tablet Take 500 mg by mouth 2 times daily      sodium bicarbonate 650 MG tablet Take 650 mg by mouth 2 times daily      pantoprazole (PROTONIX) 40 MG tablet Take 40 mg by mouth daily      tamsulosin (FLOMAX) 0.4 MG capsule Take 0.4 mg by mouth daily      Multiple Vitamin (MULTI VITAMIN DAILY PO) Take by mouth daily        No current facility-administered medications for this visit.      Facility-Administered Medications Ordered in Other Visits   Medication Dose Route Frequency Provider Last Rate Last Admin    sodium chloride flush 0.9 % injection 10 mL  10 mL IntraVENous PRN Keon Lyons MD        sodium chloride flush 0.9 % injection 20 mL  20 mL IntraVENous PRN Keon Lyons MD        heparin flush 100 UNIT/ML injection 500 Units  500 Units IntraCATHeter PRN Keon Lyons MD           Past Medical History:      Diagnosis Date    Abnormal weight loss     Anemia     Anemia, unspecified     Blind left eye     Cardiomyopathy ([address]     with compensated CHF    Cellulitis     Cellulitis of unspecified part of limb     Chronic kidney disease, stage IV (severe) ([address]     Dr. 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 IgG level in 2,000 range    Monoclonal gammopathy     NICM (nonischemic cardiomyopathy) (HCC)     Non-small cell lung cancer ([facility] 2019    Non-sustained ventricular tachycardia (HCC)     NSVT (nonsustained ventricular tachycardia) (HCC)     Osteoarthritis     Other fatigue     Other iron deficiency anemias     Secondary malignant neoplasm of other digestive organs ([facility]     Weight loss         Past Surgical History:      Procedure Laterality Date    BRONCHOSCOPY  2018    dx of adenocarcinoma RUL  Dr. [place] Dumont, COLON, DIAGNOSTIC  2019    aborted d/t vtach    EYE SURGERY Left     EYE SURGERY  1964    FOOT SURGERY      removal of joint from right toe from drilling accident, [address] [place]  10/29/2003    with resection  Mitul-en-Y proecedure  DR. Mcfarlane    TUNNELED VENOUS PORT PLACEMENT      UPPER GASTROINTESTINAL ENDOSCOPY N/A 3/13/2019    EGD W/EUS FNA performed by Fela Randall MD at [address] [facility]        Family History:      Problem Relation Age of Onset    Osteoporosis Mother     High Blood Pressure Mother     Asthma Mother     Bleeding Prob Father     Cancer Father         leukemia    Asthma Brother         Social History  Social History     Tobacco Use    Smoking status: Former     Packs/day: 2.00     Years: 40.00     Pack years: 80.00     Types: Cigarettes     Quit date: 10/29/2003     Years since quittin.9    Smokeless tobacco: Never   Substance Use Topics    Alcohol use: No    Drug use: No          Objective:  Vital Signs: Blood pressure 128/74, pulse 87, weight 174 lb 9.6 oz (79.2 kg), SpO2 96 %. Labs:  BMP:   No results for input(s): NA, K, CL, CO2, PHOS, BUN, CREATININE, CALCIUM in the last 72 hours.   CBC:   Recent Labs     22  1147   WBC 7.83   HGB 14.9   HCT 48.7   MCV 97.8*   *       LIVER PROFILE:   No results for input(s): AST, ALT, LIPASE, BILIDIR, BILITOT, ALKPHOS in the last 72 hours. Invalid input(s): AMYLASE,  ALB  PT/INR: No results for input(s): PROTIME, INR in the last 72 hours. APTT: No results for input(s): APTT in the last 72 hours. BNP:  No results for input(s): BNP in the last 72 hours. Ionized Calcium:No results for input(s): IONCA in the last 72 hours. Magnesium:No results for input(s): MG in the last 72 hours. Phosphorus:No results for input(s): PHOS in the last 72 hours. HgbA1C: No results for input(s): LABA1C in the last 72 hours. Hepatic:   No results for input(s): ALKPHOS, ALT, AST, PROT, BILITOT, BILIDIR, LABALBU in the last 72 hours. Lactic Acid: No results for input(s): LACTA in the last 72 hours. Troponin: No results for input(s): CKTOTAL, CKMB, TROPONINT in the last 72 hours. ABGs: No results for input(s): PH, PCO2, PO2, HCO3, O2SAT in the last 72 hours. CRP:  No results for input(s): CRP in the last 72 hours. Sed Rate:  No results for input(s): SEDRATE in the last 72 hours. Cultures:   No results for input(s): CULTURE in the last 72 hours. Radiology reports as per the Radiologist  Radiology: No results found. ASSESSMENT AND PLAN:  #1.      Cata Link is a [de-identified] y.o.  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 CKD and chemotherapy associated ANEMIA, previously on Procrit, currently on hold having completed chemotherapy with further improvement in hemoglobin. BPH on Flomax  Orthostatic hypotension medications managed by Dr. Wilson Combs    He completed 4 cycles of combination chemotherapy with carboplatin, Keytruda, Alimta on 7/5/2019. He was then changed to maintenance therapy with Keytruda and Alimta every 3 weeks. Prabhjot received his final cycle #25 of Keytruda/Alimta on 10/29/2020.   Treatment was held since that time due to issues of dyspnea and shortness of breath, requiring steroids. Lacey Murguia presents today for continued monitoring, to review the 9/28/2022 chest x-ray requested to be done for today's visit. He has no new specific complaints. 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 Flakito Barnett has benign prostatic hypertrophy (BPH)  that is stable on Flomax. Orthostatic hypotension resolved with adjustment of metoprolol by Dr. Tano Watson   He takes Tambocor, metoprolol without new cardiac issues. Protonix continues without any new exacerbations of GERD. Lower extremity edema overall has actually improved to some degree with diuretics prescribed by Dr. Deborah Meyer feels remarkably well, he still has issues of dyspnea on moderate exertion but less so than previously. Cardiac medications have been adjusted to reduce potential for pulmonary fibrosis. He is pleased with the way things are going. Physical examination today, 9/28/2022:    No evidence of palpable peripheral lymphadenopathy. Chronic rales bilaterally are noted in upper and lower lung fields anteriorly and posteriorly. Heart is regular normal S1 and S2, no tachycardia. Abdominal exam is benign. Neurological exam is intact with intact mental status and nonfocal neurological exam.  Brief cranial nerves exam are all intact. CBC today 9/28/2022 reveals a WBC of 7.83 Hgb is 14.9 with an MCV of 97.8 and platelet count of 138,823.       CT CHEST WO CONTRAST DIAGNOSTIC at [facility]- 11/15/2021:Comparison: CT chest without contrast 8/25/2021  3.0 x 2.2 cm spiculated mass in the right upper lobe, stable  There is pleural thickening lateral to the mass with bands of probable fibrosis, stable   4 mm RUL there is a small stellate shaped nodule, previously 5 mm  3 mm nodule in the right lower lobe posterior to the major fissure, unchanged  There is a calcified granuloma in the medial basal segment left lower lobe   Small calcified pleural plaques in the upper left hemithorax as before. There are several partially calcified mediastinal lymph nodes which appears stable  There is increased pleural thickening along the anterior margin of the right lower lobe. CT ABDOMEN PELVIS WO CONTRAST at [facility]- 11/15/2021:Comparison: CT abdomen pelvis without contrast 3/17/2021  Review of lung windows demonstrates extensive reticular interstitial fibrosis in the lower lung zones, as well as loculated pleural fluid in the right lateral lung base with pleural thickening   10 mm.hyperattenuating exophytic nodule at the inferior pole the left kidney   7 mm nephrolithiasis at the inferior pole the left kidney. The prostate gland is enlarged  There is grade 1 spondylolisthesis at L5-S1 with disc space collapse. This is stable      XR CHEST (2 VW)  on 3/3/2022 at [facility], compared to 8/16/21, documented:  A 1.7 cm opacity in the right midlung zone appears smaller on the current study, previously measuring 2.5 cm. Stable blunting of the costophrenic angles right greater than left. Coarse interstitial lung disease appears relatively stable. Probable interstitial fibrosis      X-RAYS OF THE CHEST at [facility] on 6/29/2022: The lungs show moderately severe fibrosis and COPD with pleural  reaction blunting the right costophrenic sulcus  Small area consolidation noted right upper lobe      X-RAYS OF THE CHEST at [facility] on 9/28/2022: The right-sided MediPort tip overlying the projection of the superior vena cava  The aorta is calcified and tortuous  The heart is enlarged in size. There are diffuse interstitial opacities throughout the parenchyma bilaterally with a small right effusion. There is a nodular density in the right upper lung field.     The RUL abnormality on the chest x-ray from today is likely residual scarring noted on the previous CT scan from November 2021.    Examination is completely unremarkable for new problems. He continues to use oxygen however sparingly and only in the evenings. He is now driving himself back and forth to the doctor's office and wherever he needs to go. CT scans have been scheduled for November 2022 at [facility] at his request.    I will see Mr. Nilton Gibson back in follow-up in 3 months. #2   Anemia of chronic renal failure and chemotherapy associated anemia    Procrit 20,000 units weekly for Hgb < 11.0  is given as indicated to decrease transfusion requirements. I suspect he improved performance status has something to do with his hemoglobin 12.8 causing him less dyspnea on exertion and giving him a bit more stamina. Being off of chemotherapy for a year has been very beneficial to him. #3  TUMOR SCREENING AND HEALTH MAINTENANCE    GI cancer screening  Colonoscopy on 1/13/2019 by Dr. Lucas Garcia. Recall in 5 years    Prostate cancer screening    #4  Immunizations:  Immunization History   Administered Date(s) Administered    COVID-19, MODERNA BLUE border, Primary or Immunocompromised, (age 12y+), IM, 100 mcg/0.5mL 04/05/2021, 05/03/2021                     Love Guillory was seen today for follow-up. Diagnoses and all orders for this visit:    Non-small cell lung cancer, unspecified laterality ([facility]  -     [address]; Future    Chronic kidney disease, stage IV (severe) (HCC)    Other fatigue    Lung nodule  -     CT CHEST W CONTRAST; Future         Orders Placed This Encounter   Procedures    CT CHEST W CONTRAST     Standing Status:   Future     Standing Expiration Date:   9/28/2023     Scheduling Instructions:      At [facility], compare to previous 11/15/21     Order Specific Question:   STAT Creatinine as needed:     Answer:   No     Order Specific Question:   Reason for exam:     Answer:   monitor lung nodule, hx lung cancer             Return in 3 months (on 12/28/2022) for follow-up with . Melania Iraheta

## 2022-09-28 ENCOUNTER — HOSPITAL ENCOUNTER (OUTPATIENT)
Dept: INFUSION THERAPY | Age: 82
Discharge: HOME | End: 2022-09-28
Payer: MEDICARE

## 2022-09-28 ENCOUNTER — OFFICE VISIT (OUTPATIENT)
Dept: HEMATOLOGY | Age: 82
End: 2022-09-28
Payer: MEDICARE

## 2022-09-28 ENCOUNTER — HOSPITAL ENCOUNTER (OUTPATIENT)
Dept: GENERAL RADIOLOGY | Age: 82
Discharge: HOME | End: 2022-09-28
Payer: MEDICARE

## 2022-09-28 VITALS
HEART RATE: 87 BPM | BODY MASS INDEX: 29.97 KG/M2 | SYSTOLIC BLOOD PRESSURE: 128 MMHG | OXYGEN SATURATION: 96 % | DIASTOLIC BLOOD PRESSURE: 74 MMHG | WEIGHT: 174.6 LBS

## 2022-09-28 DIAGNOSIS — C34.91 NON-SMALL CELL CANCER OF RIGHT LUNG (HCC): ICD-10-CM

## 2022-09-28 DIAGNOSIS — Z45.2 ENCOUNTER FOR CENTRAL LINE CARE: ICD-10-CM

## 2022-09-28 DIAGNOSIS — C34.90 NON-SMALL CELL LUNG CANCER, UNSPECIFIED LATERALITY (HCC): Primary | ICD-10-CM

## 2022-09-28 DIAGNOSIS — R53.83 OTHER FATIGUE: ICD-10-CM

## 2022-09-28 DIAGNOSIS — N18.4 CHRONIC KIDNEY DISEASE, STAGE IV (SEVERE) (HCC): ICD-10-CM

## 2022-09-28 DIAGNOSIS — R91.1 LUNG NODULE: ICD-10-CM

## 2022-09-28 LAB
BASOPHILS ABSOLUTE: 0.05 K/UL (ref 0.01–0.08)
BASOPHILS RELATIVE PERCENT: 0.6 % (ref 0.1–1.2)
EOSINOPHILS ABSOLUTE: 0.16 K/UL (ref 0.04–0.54)
EOSINOPHILS RELATIVE PERCENT: 2 % (ref 0.7–7)
HCT VFR BLD CALC: 48.7 % (ref 40.1–51)
HEMOGLOBIN: 14.9 G/DL (ref 13.7–17.5)
LYMPHOCYTES ABSOLUTE: 1.2 K/UL (ref 1.18–3.74)
LYMPHOCYTES RELATIVE PERCENT: 15.3 % (ref 19.3–53.1)
MCH RBC QN AUTO: 29.9 PG (ref 25.7–32.2)
MCHC RBC AUTO-ENTMCNC: 30.6 G/DL (ref 32.3–36.5)
MCV RBC AUTO: 97.8 FL (ref 79–92.2)
MONOCYTES ABSOLUTE: 0.94 K/UL (ref 0.24–0.82)
MONOCYTES RELATIVE PERCENT: 12 % (ref 4.7–12.5)
NEUTROPHILS ABSOLUTE: 5.45 K/UL (ref 1.56–6.13)
NEUTROPHILS RELATIVE PERCENT: 69.7 % (ref 34–71.1)
PDW BLD-RTO: 15.1 % (ref 11.6–14.4)
PLATELET # BLD: 148 K/UL (ref 163–337)
PMV BLD AUTO: 12.2 FL (ref 7.4–10.4)
RBC # BLD: 4.98 M/UL (ref 4.63–6.08)
WBC # BLD: 7.83 K/UL (ref 4.23–9.07)

## 2022-09-28 PROCEDURE — 6360000002 HC RX W HCPCS: Performed by: INTERNAL MEDICINE

## 2022-09-28 PROCEDURE — 1123F ACP DISCUSS/DSCN MKR DOCD: CPT | Performed by: INTERNAL MEDICINE

## 2022-09-28 PROCEDURE — 96523 IRRIG DRUG DELIVERY DEVICE: CPT

## 2022-09-28 PROCEDURE — 71046 X-RAY EXAM CHEST 2 VIEWS: CPT

## 2022-09-28 PROCEDURE — 85025 COMPLETE CBC W/AUTO DIFF WBC: CPT

## 2022-09-28 PROCEDURE — 1036F TOBACCO NON-USER: CPT | Performed by: INTERNAL MEDICINE

## 2022-09-28 PROCEDURE — G8427 DOCREV CUR MEDS BY ELIG CLIN: HCPCS | Performed by: INTERNAL MEDICINE

## 2022-09-28 PROCEDURE — 2580000003 HC RX 258: Performed by: INTERNAL MEDICINE

## 2022-09-28 PROCEDURE — G8417 CALC BMI ABV UP PARAM F/U: HCPCS | Performed by: INTERNAL MEDICINE

## 2022-09-28 PROCEDURE — 99214 OFFICE O/P EST MOD 30 MIN: CPT | Performed by: INTERNAL MEDICINE

## 2022-09-28 PROCEDURE — 99212 OFFICE O/P EST SF 10 MIN: CPT

## 2022-09-28 RX ORDER — SODIUM CHLORIDE 0.9 % (FLUSH) 0.9 %
20 SYRINGE (ML) INJECTION PRN
Status: DISCONTINUED | OUTPATIENT
Start: 2022-09-28 | End: 2022-09-29 | Stop reason: HOSPADM

## 2022-09-28 RX ORDER — SODIUM CHLORIDE 0.9 % (FLUSH) 0.9 %
20 SYRINGE (ML) INJECTION PRN
OUTPATIENT
Start: 2022-09-28

## 2022-09-28 RX ORDER — HEPARIN SODIUM (PORCINE) LOCK FLUSH IV SOLN 100 UNIT/ML 100 UNIT/ML
500 SOLUTION INTRAVENOUS PRN
Status: DISCONTINUED | OUTPATIENT
Start: 2022-09-28 | End: 2022-09-29 | Stop reason: HOSPADM

## 2022-09-28 RX ORDER — HEPARIN SODIUM (PORCINE) LOCK FLUSH IV SOLN 100 UNIT/ML 100 UNIT/ML
500 SOLUTION INTRAVENOUS PRN
OUTPATIENT
Start: 2022-09-28

## 2022-09-28 RX ORDER — FUROSEMIDE 20 MG/1
20 TABLET ORAL DAILY
COMMUNITY

## 2022-09-28 RX ORDER — SODIUM CHLORIDE 0.9 % (FLUSH) 0.9 %
10 SYRINGE (ML) INJECTION PRN
OUTPATIENT
Start: 2022-09-28

## 2022-09-28 RX ORDER — SODIUM CHLORIDE 0.9 % (FLUSH) 0.9 %
10 SYRINGE (ML) INJECTION PRN
Status: DISCONTINUED | OUTPATIENT
Start: 2022-09-28 | End: 2022-09-29 | Stop reason: HOSPADM

## 2022-09-28 RX ADMIN — SODIUM CHLORIDE, PRESERVATIVE FREE 20 ML: 5 INJECTION INTRAVENOUS at 13:49

## 2022-09-28 RX ADMIN — HEPARIN 500 UNITS: 100 SYRINGE at 13:49

## 2022-10-05 ENCOUNTER — TELEPHONE (OUTPATIENT)
Dept: CARDIOLOGY CLINIC | Age: 82
End: 2022-10-05

## 2022-10-05 NOTE — TELEPHONE ENCOUNTER
Patient called in and wanted to let you know he has still been taking the toprol every day. He stated that his heart rate has normalized. So he will continue taking it every day instead of every other day.

## 2022-11-16 ENCOUNTER — HOSPITAL ENCOUNTER (OUTPATIENT)
Dept: CT IMAGING | Facility: HOSPITAL | Age: 82
Discharge: HOME | End: 2022-11-16
Admitting: INTERNAL MEDICINE

## 2022-11-16 PROCEDURE — 71250 CT THORAX DX C-: CPT

## 2022-11-16 PROCEDURE — 74176 CT ABD & PELVIS W/O CONTRAST: CPT

## 2022-12-27 NOTE — PROGRESS NOTES
by Dr. Latonia Durham   He takes Tambocor, metoprolol without new cardiac issues. Protonix continues without any new exacerbations of GERD. Lower extremity edema overall has actually improved to some degree     Not able to get his breath completely. He uses supplemental oxygen sparingly. Rashida Hernandez feels remarkably well, he still has issues of dyspnea on moderate exertion but less so than previously. He is not able to get his breath completely. He uses supplemental oxygen sparingly. Cardiac medications have been adjusted to reduce potential for pulmonary fibrosis. He is pleased with the way things are going. He has right leg discomfort but otherwise no new complaints. Rashida Hernandez had a chest x-ray on 3/3/2022 and comes for continued monitoring. 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 He denied respiratory symptoms of shortness of air, cough or productive sputum. A CT scan of the chest on 9/12/2018 had been ordered by Dr. Austin Kong due to weight loss and identified a new lobulated right upper lobe 5.7 cm mass that extended back to the right hilum. Numerous mediastinal lymph nodes ranging in size from mm to 1.3 cm and a subcarnial lymph node that measured 1.5 x 1.5 x 3.5 cm. A CT scan of the abdomen and pelvis with contrast on 9/12/2018 documented a 4.9 x 4 x 4 cm soft tissue mass with peripheral calcification immediately adjacent to the gallbladder in the head of the pancreas area. An MRI of the abdomen and pelvis W & WO on 10/11/2018 identified in the 4.1 x 3.4 cm soft tissue mass in the subhepatic space, abutting the second portion of the duodenum with mild displacement of the duodenum. There does not appear to be involvement of the pancreas, and the pancreas appears within normal limits.   Differential diagnoses include a desmoid tumor, less likely leiomyoma of the wall of the duodenum or malignancy. Dr. Paula Leger requested a CT-guided biopsy of the right upper lobe mass. Dr. Arian Sam, the radiologist, evaluated the area with a repeat CT scan of the chest on 10/11/2018. He spoke with Dr. Paula Leger indicating that he would not be able to perform the biopsy because the tumor was not accessible, it was under the scapula , which blocked access and therefore the biopsy procedure was canceled. On the CT scan of the chest on 10/11/2018 measurements of the RUL lung mass was 5.7 x 3.2 cm with irregular margins suspicious for a primary lung neoplasm. Soft tissue associated with the tumor appeared to extend into the bronchus supplying this portion of the RUL of the lung. Dr. Arian Sam indicated that the mass had an endobronchial component and should be easily assessable via bronchoscopy. The chest CT also included a portion of the upper abdomen where a soft tissue mass was described in an addendum report. In the subhepatic space measuring 4.0 x 3.9 cm, displacing the second portion of the duodenum medially. A referral was made to Dr. Korina Delgadillo for consideration for bronchoscopy for biopsy. On 10/24/2018, Dr. Pavel Tinajero performed a bronchoscopy with EBUS guided biopsy of a 3 cm subcarinal lymph node along with bronchoalveolar lavage of the posterior segment of the RUL of the lung. The final pathology of the station 7 lymph node only revealed a background of small mature lymphocytes with clusters of histiocytes suggestive of granuloma. Negative for malignant cells. Kinyoun and GMS stains were negative for AFB and fungal organisms respectively. The bronchial washings were negative for malignant cells as well. Mr. Monika Carlisle was referred to Dr. Arnetta Lennox at [facility] in [place], [place], for navigational bronchoscopy and biopsy under ultrasound.      On 11/27/2018 Dr. Arnetta Lennox performed a bronchoscopy, navigational protocol, endobronchial ultrasound and biopsy of the RUL of the lung mass along with multiple lymph node station Biopsies. Pathology revealed the following:  Poorly differentiated Adenocarcinoma from the RUL of the lung mass on biopsy and bronchoalveolar lavage consistent with a lung primary. All lymph nodes sampled were NEGATIVE for malignant cells including stations 10L, 4L, station 7, 10R, 4R. IHC studies were positive for CK7, TTF-1 and Napsin A. IHC studies on tumor cells were negative for CDX2, CK5/6, and p63   Further lung profile molecular testing is pending     All of the above was discussed at length with Mr. Arun House at his 12/13/2018 clinic visit. He was agreeable for referral for consideration of resection of the lung lesion. He is comfortable with and would like a referral to Dr. Georgia Katz ([phone]) at [place] [address], [address] [place] [address], [address] [place] [address]. Logistics, however, are planning a big part in his decision making and he may decide to have surgery done in the [place] area. If he decides to have surgery in the [place], referral will be made to Dr. Jeanie Glynn. CT chest with contrast 2/18/2019 at [facility] compared to 9/12/2018 revealed a 4.9 x 3.5 cm spiculated RUL lung mass which actually decreased in size from a prior value of 6.1 x 3.6 cm. Subhepatic mass adjacent to the descending duodenum had increased in size significantly to 4.3 x 9 cm compared to 4.1 x 3.4 cm on the 10/11/18 MRI of the abdomen. CT of the abdomen and pelvis without contrast on 3/20/2019 at [facility] was compared to outpatient CT data and pelvis on 9/12/2018 an MRI of the abdomen from 10/11/2018. A soft tissue mass was again encountered in the subhepatic space which abutted the distal stomach and proximal duodenum measuring 8.9 x 5.4 cm which has increased considerably from a prior measurement of 4.1 x 3.4 cm.  Medial to the left renal hilum a 3.5 cm lobular mass causing some mild left-sided hydronephrosis. Mr. Paul Henriquez was referred to Dr. Odalys Rausch who saw him on 1/22/2019 anticipating plans for a curative resection. Dr. Carin Olszewski received a phone call on 2/20/2019 from Dr. Burke Gamez , indicating that the previously documented an abdominal pancreatic area mass had doubled in size and was causing compression into the left kidney/renal pelvis producing a hydroureter. Dr. Burke Gamez arranged a referral to Dr. Damaris Durand who will be placing a stent 3/8/2019. Mr. Paul Henriquez was scheduled to be seen by gastroenterology at [facility] on 3/13/2019 for EGD/EUS assessment and biopsy of the enlarging peripancreatic/duodenal area mass. With a decrease in the lung mass and increased size of the subhepatic mass-surgical resection was abandoned at present pending further evaluation of the subhepatic mass. Dr. Carin Olszewski discussed treatment options with the unresectable lung mass and the per he pancreatic mass and recommended a combination of Keytruda, Alimta, carboplatin. He was subsequently admitted to [facility] 4/26 - 4/30/2019 with abdominal pain and melena. WBC fantasma was 0.75 with ANC 0.34. PLT did drop to 24,000. He did have duodenal ulcerations that were bleeding on endoscopy. He was stabilized but then readmitted from 5/8 - 5/10/2019 with recurrent melanoma. A repeat endoscopy was performed. It was felt that exacerbation of his bleeding from the duodenal came about by his thrombocytopenia from treatment. Subsequent dosing was adjusted and Neulasta was added. CT chest without contrast at [facility] on 6/27/2019 was compared to 2/18/2019 revealed that the right lung mass that extended into the right hilum has decreased from 5.2 x 3.5 down to 4.6 x 3.6. There is increased central cavitation in the mass consistent with tumor necrosis. Mediastinal lymphadenopathy is relatively stable.      CT abdomen and pelvis without contrast at [facility] on 6/27/2019 was compared to 4/26/2019 revealed complete resolution of the previously identified duodenal/subhepatic space soft tissue mass. The previously identified heterogenous enhancing lesion in the left renal pelvis was much less prominent but there does continue to be some mild left caliectasis. I reviewed the CT results with Dr. Jatin Simmons on 7/5/2019 who then relayed them as well to [facility]. We've had excellent results from this combination of therapy. He completed the fourth combination therapy of Curlee Kleine on 7/5/2019 and then transitioned to maintenance Keytruda plus Alimta. CT chest without contrast at [facility] on 1/9/2020 was compared to 10/17/2019 revealing:   A stable spiculated RUL nodule/mass with similar mediastinal adenopathy. Questionable right hilar adenopathy with diminished assessment due to lack of IV contrast.    Advanced emphysema with severe pulmonary fibrosis. No new pulmonary nodules. CT abdomen and pelvis without contrast at [facility] on 1/9/2020 was compared to 10/17/2019 revealing:   Mildly prominent aortocaval RP lymph nodes with position just below the level of the renal vein worrisome for potential lymphatic metastases. This is similar to 10/17/2019 but increased from 4/26/2019. Pneumobilia without discrete suspicious focal lesion in the liver. Wall thickening of the duodenum. Similar and stable renal lesions favoring cysts. He has had numerous endoscopies within the past year. While he was having an Endo EUS and had cardiac issues and was actually admitted to the intensive care unit.       CT abdomen and pelvis without contrast on 7/16/2020 at [facility] was compared to 1/9/2020 and documented:  1.5 x 0.9 cm aortocaval lymph node, previously 1.9 x 1.2 cm  No new abnormality seen    CT chest without contrast on 11/12/2020 at [facility] ws compared to 1/9/2020 and documented:  Mixed response therapy with interval decrease in size in the RIGHT upper lobe pulmonary nodule but increase in size and mediastinal lymph nodes. In addition, there is severe emphysema with findings of severe pulmonary fibrosis. Interval worsening of interstitial opacities bilaterally as well as suggestion of some pleural nodularity. Lymphangitic spread of malignancy should be considered. Small pleural effusion on the RIGHT is new     CT abdomen and pelvis without contrast on 11/12/2020 at [facility] was compared to 7/16/2020 and documented: The aortic caval node described on the comparison study is not significantly changed in size when measured at the same location. Nonobstructing renal calculus on the LEFT. No change in the indeterminate renal lesions on the LEFT, likely cysts. 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 Chloe Jarvis has benign prostatic hypertrophy (BPH)  that is stable on Flomax. Orthostatic hypotension resolved with adjustment of metoprolol by Dr. Chi Reyes   He takes Tambocor, metoprolol without new cardiac issues. Protonix continues without any new exacerbations of GERD. Lower extremity edema overall has actually improved to some degree. Prabhjot received his final cycle #25 of Keytruda/Alimta on 10/29/2020. Treatment was held since that time due to issues of dyspnea and shortness of breath. A course of steroids (prednisone) was initiated and antibiotics also delivered and although his respiratory issues improved, he has continued to have issues with weight loss. CXR on 12/17/2020 documented no interval change, but in my personal review of the x-ray, there is significant pulmonary fibrosis not significantly changed compared to the x-ray from October 2020.     I discussed the findings on the chest x-ray and the comparison at his clinic visit on 12/30/2020. He does not appear to be improving but rather quite stable. Valdo Jauregui has pulmonary fibrosis as documented on a CT scan of the chest without contrast at [facility] on 11/12/2020. Carmen Barnett is in agreement to go on a chemotherapy holiday with a repeat CT scan of the chest in 3 months and monitor his situation clinically and radiographically without systemic intervention going forward. CT CHEST WO  contrast on 3/17/2021, compared to 11/12/2020 at [facility] documented:   Mixed response therapy with interval decrease in size in the RIGHT upper lobe pulmonary nodule but increase in size and mediastinal lymph nodes. Slight interval decrease in size in the RIGHT upper lobe nodule/mass measuring 4.7 x 2.2 cm today, previously 5.0 x 2.4 cm. RIGHT apical nodular density measuring up to 0.6 cm, previously 0.4 cm. In addition, there is severe emphysema with findings of severe pulmonary fibrosis. Interval worsening of interstitial opacities bilaterally as well as suggestion of some pleural nodularity. Lymphangitic spread of malignancy should be considered. Small pleural effusion on the RIGHT is new. CT ABD & PELVIS  WO contrast on 3/17/2021, compared to 11/12/2020,  at [facility] documented:  Nonobstructing calculus lower pole left kidney  Low-density lesions associated with the left renal contour probably proteinaceous cyst these are unchanged  Chronic right lateral pleural complex in the lung base with bilateral small basilar pneumothoraces. .     XR CHEST (2 VW) 4/21/2021 at [facility] , compared to December 17, 2020, documented:  Advanced interstitial fibrotic changes noted throughout the pulmonary parenchyma unchanged from December 17, 2020. Small to moderate right lateral pleural complex. This may be pleural fluid or thickening is unchanged December 17, 2020. XR CHEST (2 VW)  6/16/2021:  2.5 cm area of residual nodularity versus scarring in the RUL zone. Probable small pleural effusions.   Bilateral interstitial infiltrates stable compared to the 2 most recent studies but increased compared to the 2019 study. There may beunderlying pulmonary fibrosis that is worsening. Pulmonary edema is not ruled out. CT CHEST WO CONTRAST DIAGNOSTIC at [facility]- 11/15/2021:Comparison: CT chest without contrast 8/25/2021  3.0 x 2.2 cm spiculated mass in the right upper lobe, stable  There is pleural thickening lateral to the mass with bands of probable fibrosis, stable   4 mm RUL there is a small stellate shaped nodule, previously 5 mm  3 mm nodule in the right lower lobe posterior to the major fissure, unchanged  There is a calcified granuloma in the medial basal segment left lower lobe   Small calcified pleural plaques in the upper left hemithorax as before. There are several partially calcified mediastinal lymph nodes which appears stable  There is increased pleural thickening along the anterior margin of the right lower lobe. CT ABDOMEN PELVIS WO CONTRAST at [facility]- 11/15/2021:Comparison: CT abdomen pelvis without contrast 3/17/2021  Review of lung windows demonstrates extensive reticular interstitial fibrosis in the lower lung zones, as well as loculated pleural fluid in the right lateral lung base with pleural thickening   10 mm.hyperattenuating exophytic nodule at the inferior pole the left kidney   7 mm nephrolithiasis at the inferior pole the left kidney. The prostate gland is enlarged  There is grade 1 spondylolisthesis at L5-S1 with disc space collapse. This is stable    XR CHEST (2 VW)  on 3/3/2022 at [address], compared to 8/16/21, documented:  A 1.7 cm opacity in the right midlung zone appears smaller on the current study, previously measuring 2.5 cm. Stable blunting of the costophrenic angles right greater than left. Coarse interstitial lung disease appears relatively stable. Probable interstitial fibrosis    X-RAYS OF THE CHEST at [address] on 6/29/2022:   The lungs show moderately severe fibrosis and COPD with pleural reaction blunting the right costophrenic sulcus  Small area consolidation noted right upper lobe      X-RAYS OF THE CHEST at [facility] on 9/28/2022: The right-sided MediPort tip overlying the projection of the superior vena cava  The aorta is calcified and tortuous  The heart is enlarged in size. There are diffuse interstitial opacities throughout the parenchyma bilaterally with a small right effusion. There is a nodular density in the right upper lung field. The RUL abnormality on the chest x-ray from today is likely residual scarring noted on the previous CT scan from November 2021. CT chest without contrast  at [facility] on 11/16/2022:COMPARISON: 11/15/2021, 8/25/2021  9 mm RIGHT upper lobe pulmonary nodule, previously 4 mm  3.0 x 1.9 cm spiculated mass in the RIGHT upper lobe abutting the major minor fissures, unchanged  Chronic interstitial thickening, bronchiectasis, and interstitial fibrosis is similar to multiple prior studies. Small RIGHT pleural effusion. No change in a few borderline prominent partially calcified mediastinal lymph nodes      CT ABDOMEN PELVIS WO CONTRAST at [facility] on 11/16/2022:COMPARISON: 11/15/2021   There is mild cardiomegaly with mitral annulus calcifications and coronary artery calcification  Trace pericardial effusion or pericardial thickening is present. There is interstitial fibrosis within the lung bases with honeycombing. There is a loculated effusion within the right lateral costophrenic angle stable from the previous exam  There are cysts involving the left kidney. More complex exophytic lesions involving the posterior midpole and the left lower pole may represent hemorrhagic cysts.   Anterolisthesis of L4 on L5 and L5 on S1 is present suggesting subluxation at the level of the facets at L4-L5  The prostate gland is mildly enlarged          TREATMENT SUMMARY  Keytruda, carboplatin, Alimta initiated 4/18/2019 to be given every 3 weeks for 4 cycles - 4th cycle 7/5/2019, then Keytruda + Alimta as maintenance to continue indefinitely until progression or intolerable side effects   Prabhjot received cycle #25 of Meldon Hernesto on 10/29/2020. He was then placed on an indefinite chemotherapy holiday on 12/30/2020         #2 TUMOR HISTORY: Pancreatic mass diagnosed 10/29/03  Mr. Feng Lundberg presented in October 2003 with obstructive jaundice. A CT scan of the abdomen on 10/26/2003 documented a 3.2 x 4 x 4.2 cm mass in the head of the pancreas. On 10/29/03 Dr. Karthik Guillory performed a resection of a portion of the head of the pancreas on [address] along with a Mitul-en-Y procedure and a choledochojejunostomy for what was felt to be a pancreatic cancer. His pancreas was bypassed because of the findings. Pathology of the biopsies were negative for malignancy showing a fibrosed pancreas. Mr. Feng Lundberg was referred to Dr. Karson Ragsdale in [place]. Dr. Karson Ragsdale performed ERCP with an EUS and biopsy on 02/26/04   Pathology again was negative for cancer or malignancy. Routine periodic serologic and radiographic monitoring has not disclosed progression of the mass or development of new malignancy or increase in pancreatic tumor markers. A CT scan of the abdomen and pelvis with contrast on 9/12/2018 documented a 4.9 x 4 x 4 cm soft tissue mass with peripheral calcification immediately adjacent to the gallbladder in the head of the pancreas area. An MRI of the abdomen and pelvis W & WO on 10/11/2018 identified in the 4.1 x 3.4 cm soft tissue mass in the subhepatic space, abutting the second portion of the duodenum with mild displacement of the duodenum. There does not appear to be involvement of the pancreas, and the pancreas appears within normal limits. Differential diagnoses include a desmoid tumor, less likely leiomyoma of the wall of the duodenum or malignancy.      CT of the abdomen and pelvis without contrast on 3/20/2019 at [facility] was compared to outpatient CT data and pelvis on 9/12/2018 an MRI of the abdomen from 10/11/2018. A soft tissue mass was again encountered in the subhepatic space which abutted the distal stomach and proximal duodenum measuring 8.9 x 5.4 cm which has increased considerably from a prior measurement of 4.1 x 3.4 cm. Medial to the left renal hilum a 3.5 cm lobular mass causing some mild left-sided hydronephrosis. Mr. Bert Hughes was scheduled for an EGD/EUS by Dr. Bartolome Zhou as an outpatient procedure on 3/13/2019 for assessment and biopsy of the enlarging peripancreatic/duodenal area mass. Unfortunately, after initiation of the procedure, he began having ventricular tachycardia and the procedure had to be aborted. Joanna Snow was transferred to the ICU for cardiology evaluation and stabilization. He remained hospitalized until 3/18/2019 when he was medically cleared for discharge. 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 No emergent urologic intervention was warranted and he received monitoring of renal function. He had a creatinine level of 1.9 at discharge. PET scan on 4/2/2019 identified a soft tissue mass in the RUL measuring 1.2 x 3.5 cm with extension to the right anterior hilum with an SUV of 10.1. Evidence of mediastinal and hilar lymphadenopathy, with low-level metabolic activity. Interval increase in the size of a large mass in the right upper abdomen inseparable from the duodenum measuring 10.7 x 6.9 cm compared to 5.4 x 8.9 cm on 2/20/2019 with an SUV of 23.6. Slight increase in 2 small inseparable masses medial to the hilum of the left kidney measuring 2.2 and 2.6 cm and the other measuring 3.9 cm with a maximum SUV of 18.6. Cycle #1 of palliative chemotherapy with Carboplatin, Alimta and Keytruda was initiated 4/18/19 which should also cover this process.      Abdominal/pelvic CT 4/26/19 was performed at hospitals due to abdominal pain and melena. The RUQ mass, within the duodenum decreased to 6.8 x 6.2 cm (was 10.7 x 6.9 cm on PET 4/2/19)     CT abdomen and pelvis without contrast at [facility] on 6/27/2019 was compared to 4/26/2019 revealed complete resolution of the previously identified. Duodenal/subhepatic space soft tissue mass. The previously identified heterogenous enhancing lesion in the left renal pelvis was much less prominent but there does continue to be some mild left caliectasis. CT chest without contrast at [facility] on 1/9/2020 was compared to 10/17/2019 revealing:   A stable spiculated RUL nodule/mass with similar mediastinal adenopathy. Questionable right hilar adenopathy with diminished assessment due to lack of IV contrast.    Advanced emphysema with severe pulmonary fibrosis. No new pulmonary nodules. CT abdomen and pelvis without contrast at [facility] on 1/9/2020 was compared to 10/17/2019 revealing:   Mildly prominent aortocaval RP lymph nodes with position just below the level of the renal vein worrisome for potential lymphatic metastases. This is similar to 10/17/2019 but increased from 4/26/2019. Pneumobilia without discrete suspicious focal lesion in the liver. Wall thickening of the duodenum. Similar and stable renal lesions favoring cysts. CT ABD & PELVIS  WO contrast on 3/17/2021, compared to 11/12/2020,  at [facility] documented:  Nonobstructing calculus lower pole left kidney  Low-density lesions associated with the left renal contour probably proteinaceous cyst these are unchanged  Chronic right lateral pleural complex in the lung base with bilateral small basilar pneumothoraces. .     He has had numerous endoscopies within the past year. When he was having an Endo EUS he had cardiac issues and required to the intensive care unit. This area will just be monitored on follow-up scans without further elective endoscopic surveillance. .     CT ABDOMEN PELVIS WO CONTRAST at [facility]- 11/15/2021:Comparison: CT abdomen pelvis without contrast 3/17/2021  Review of lung windows demonstrates extensive reticular interstitial fibrosis in the lower lung zones, as well as loculated pleural fluid in the right lateral lung base with pleural thickening   10 mm.hyperattenuating exophytic nodule at the inferior pole the left kidney   7 mm nephrolithiasis at the inferior pole the left kidney. The prostate gland is enlarged  There is grade 1 spondylolisthesis at L5-S1 with disc space collapse. This is stable      CT ABDOMEN PELVIS WO CONTRAST at [facility] on 11/16/2022:COMPARISON: 11/15/2021   There is mild cardiomegaly with mitral annulus calcifications and coronary artery calcification  Trace pericardial effusion or pericardial thickening is present. There is interstitial fibrosis within the lung bases with honeycombing. There is a loculated effusion within the right lateral costophrenic angle stable from the previous exam  There are cysts involving the left kidney. More complex exophytic lesions involving the posterior midpole and the left lower pole may represent hemorrhagic cysts. Anterolisthesis of L4 on L5 and L5 on S1 is present suggesting subluxation at the level of the facets at L4-L5  The prostate gland is mildly enlarged       #1 HEMATOLOGICAL HISTORY: IgG kappa MGUS- Dx: 02/23/06. During the course of Mr. Miller Apley follow up, an abnormally elevated protein was noted on one occasion, which led to workup including quantitative immunoglobulins. An elevated IgG kappa was encountered. This has remained stable in the 2500 range since early 2006. A bone marrow biopsy and aspirate on 02/23/06 was negative with only 4% plasma cells. 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 24 hour urine for immunoelectrophoresis did not reveal an M-spike. Serology studies on 9/18/2018 documented an elevated, stable IgG of 2589 with an M spike of 0.7.    Immunofixation continued to reveal IgG monoclonal protein with kappa light chain specificity. #2 HEMATOLOGICAL HISTORY: Iron deficiency anemia, 9/18/2018  Kamini Alberts had serology on 9/18/2018 that identified iron deficiency anemia. His lab work was obtained at [facility].  An iron level was 12, iron saturation 7%, ferritin 256  Folate >20, and vitamin B12 1044. Dr. Agatha Clinton placed Sophia on ferrous sulfate 325 mg daily on 9/25/2018 , but this failed to resolve the anemia. An EGD by Dr. Annelise Merino on 12/11/2018 documented a normal esophagus, normal stomach and a degree of duodenal deformity. Gastric biopsy was urease negative. Colonoscopy by Dr. Annelise Merino on 1/9/2019 documented a polyp at 80 cm. Pathology identified a tubular adenoma, negative for high-grade dysplasia. Feraheme administered. Labs on 3/13/2019:  Iron 25   Iron saturation 22%   TIBC 116   Ferritin >2000   Reticulocyte count 0.0578      Haptoglobin 341   Folate >20   Vitamin B12 945   Erythropoietin 13     He presented to [facility] on 4/26/2019 with melena and abdominal discomfort. He was subsequently admitted     EGD per Dr. Angel Maradiaga on 4/29/2019 revealed  LA grade (1 or more mucosal breaks greater than 5 mm, not extending between the tops of the 2 mucosal folds)? esophagitis with no bleeding found in the distal esophagus   Stomach was normal, biopsies for H. pylori taken. Cardia and gastric fundus were normal on retroflexion   One nonbleeding cratered duodenal ulcer with no stigmata of bleeding was found in the duodenal bulb lesion was about 9 mm. Many nonbleeding cratered, linear and superficial duodenal ulcers with no stigmata of bleeding were found in the second portion of the duodenum. The largest lesion was 6 mm in largest dimension. No mass encountered and anastomosis not seen. He was admitted to [facility] on 5/8/2019 with melena, hematochezia, anemia, thrombocytopenia     Endoscopy performed by Dr. Ashley Whiteside on 5/9/2019 revealed  Normal esophagus   Gastric mucosal variant.  2 erythematous spots in the antrum, ? AVM   Normal examined duodenum   Nothing found to account for symptoms   He developed pancytopenia from chemotherapy and did require transfusions. His hemoglobin dropped to 7.3 on 6/25/2019 after his last treatment. He received 2 units packed red blood cells as an outpatient. Serology 6/13/2019  Serum Fe - 117  TIBC - 587  Fe sat - 19.9%  Ferritin - 1,000  Iron levels have been adequately replaced. 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 TREATMENT SUMMARY  Feraheme 510 mg IV on 2/14 and 2/21/19   Venofer 200 mg IV daily 5/8 - 5/10/2019 as IP at [facility]   s/p 2 units pRBC on 4/26/19 and 2 units pRBC on 4/29/2019 as IP at [facility]?  s/p 2 units pRBC on?5/8/2019   s/p 2 pheresis plts on 5/8/2019  s/p 2 units pRBC as OP 6/26/2019    Allergies:  Penicillins    Medicines:  Current Outpatient Medications   Medication Sig Dispense Refill    furosemide (LASIX) 20 MG tablet Take 20 mg by mouth daily      spironolactone (ALDACTONE) 25 MG tablet Take 25 mg by mouth 2 times daily      metoprolol succinate (TOPROL XL) 25 MG extended release tablet Take 1/2 (one-half) tablet by mouth once daily 45 tablet 3    losartan (COZAAR) 50 MG tablet Take 50 mg by mouth daily      melatonin 3 MG TABS tablet Take 10 mg by mouth daily      acetaminophen (TYLENOL) 500 MG tablet Take 500 mg by mouth 2 times daily      sodium bicarbonate 650 MG tablet Take 650 mg by mouth 2 times daily      pantoprazole (PROTONIX) 40 MG tablet Take 40 mg by mouth daily      tamsulosin (FLOMAX) 0.4 MG capsule Take 0.4 mg by mouth daily      Multiple Vitamin (MULTI VITAMIN DAILY PO) Take by mouth daily        No current facility-administered medications for this visit.        Past Medical History:      Diagnosis Date    Abnormal weight loss     Anemia     Anemia, unspecified     Blind left eye     Cardiomyopathy ([facility] [address]     with compensated CHF    Cellulitis     Cellulitis of unspecified part of limb     Chronic kidney disease, stage IV (severe) (HCC)     Dr. Pramod Zamora    COPD (chronic obstructive pulmonary disease) ([facility] [address]     Duodenal ulcer     Encounter for central line care 3/16/2022    Essential hypertension 10/2/2017    Eye injury     at age 12 from penetrating injury    Gout     HTN (hypertension)     Hydronephrosis     Hydronephrosis, left     Liver abscess     resolved    Lung nodule     Malignant neoplasm ([facility] [address]     Malignant neoplasm of upper lobe, right bronchus or lung (HCC)     MGUS (monoclonal gammopathy of unknown significance)     secondary to an IgG kappa. IgG level in 2,000 range    Monoclonal gammopathy     NICM (nonischemic cardiomyopathy) (HCC)     Non-small cell lung cancer ([facility] [address] 6/4/2019    Non-sustained ventricular tachycardia (HCC)     NSVT (nonsustained ventricular tachycardia) (HCC)     Osteoarthritis     Other fatigue     Other iron deficiency anemias     Secondary malignant neoplasm of other digestive organs ([facility] [address]     Weight loss         Past Surgical History:      Procedure Laterality Date    BRONCHOSCOPY  11/27/2018    dx of adenocarcinoma RUL  Dr. Jaden Padgett, COLON, DIAGNOSTIC  03/30/2019    aborted d/t vtach    EYE SURGERY Left     EYE SURGERY  1964    FOOT SURGERY      removal of joint from right toe from drilling accident, [address]  10/29/2003    with resection  Mitul-en-Y proecedure  DR. Mcfarlane    TUNNELED VENOUS PORT PLACEMENT      UPPER GASTROINTESTINAL ENDOSCOPY N/A 3/13/2019    EGD W/EUS FNA performed by Chao Cid MD at [facility] [place] [facility]        Family History:      Problem Relation Age of Onset    Osteoporosis Mother     High Blood Pressure Mother     Asthma Mother     Bleeding Prob Father     Cancer Father         leukemia    Asthma Brother         Social History  Social History Tobacco Use    Smoking status: Former     Packs/day: 2.00     Years: 40.00     Pack years: 80.00     Types: Cigarettes     Quit date: 10/29/2003     Years since quittin.1    Smokeless tobacco: Never   Substance Use Topics    Alcohol use: No    Drug use: No          Objective:  Vital Signs: Blood pressure 120/78, pulse 91, height 5' 4\" (1.626 m), weight 175 lb (79.4 kg), SpO2 92 %. Labs:  BMP:   No results for input(s): NA, K, CL, CO2, PHOS, BUN, CREATININE, CALCIUM in the last 72 hours. CBC:   Recent Labs     22  1046   WBC 7.20   HGB 15.0   HCT 47.8   MCV 96.4*   *         LIVER PROFILE:   No results for input(s): AST, ALT, LIPASE, BILIDIR, BILITOT, ALKPHOS in the last 72 hours. Invalid input(s): AMYLASE,  ALB  PT/INR: No results for input(s): PROTIME, INR in the last 72 hours. APTT: No results for input(s): APTT in the last 72 hours. BNP:  No results for input(s): BNP in the last 72 hours. Ionized Calcium:No results for input(s): IONCA in the last 72 hours. Magnesium:No results for input(s): MG in the last 72 hours. Phosphorus:No results for input(s): PHOS in the last 72 hours. HgbA1C: No results for input(s): LABA1C in the last 72 hours. Hepatic:   No results for input(s): ALKPHOS, ALT, AST, PROT, BILITOT, BILIDIR, LABALBU in the last 72 hours. Lactic Acid: No results for input(s): LACTA in the last 72 hours. Troponin: No results for input(s): CKTOTAL, CKMB, TROPONINT in the last 72 hours. ABGs: No results for input(s): PH, PCO2, PO2, HCO3, O2SAT in the last 72 hours. CRP:  No results for input(s): CRP in the last 72 hours. Sed Rate:  No results for input(s): SEDRATE in the last 72 hours. Cultures:   No results for input(s): CULTURE in the last 72 hours. Radiology reports as per the Radiologist  Radiology: No results found. ASSESSMENT AND PLAN:  #1.      Umang Brown is a [de-identified] y.o.   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 CKD and chemotherapy associated ANEMIA, previously on Procrit, currently on hold having completed chemotherapy with further improvement in hemoglobin. BPH on Flomax  Orthostatic hypotension medications managed by Dr. Angie Banuelos    He completed 4 cycles of combination chemotherapy with carboplatin, Keytruda, Alimta on 7/5/2019. He was then changed to maintenance therapy with Keytruda and Alimta every 3 weeks. Prabhjot received his final cycle #25 of Keytruda/Alimta on 10/29/2020. Treatment was held since that time due to issues of dyspnea and shortness of breath, requiring steroids. Kathie Vergara presents today for continued monitoring, to review the 11/16/2022 CT scan of the chest abdomen and pelvis requested to be done for today's visit. He has no new specific complaints. He continues to have chronic dyspnea on exertion associated with pulmonary fibrosis from smoking history as well as drilling and blasting rock at the [employer], but still at baseline without exacerbations. ACTIVE or ASSOCIATED PROBLEMS:  Pulmonary fibrosis secondary to previous history of smoking and drilling rock for blasting at the [facility]   CKD associated anemia responding to Procrit. Beth Conway has benign prostatic hypertrophy (BPH)  that is stable on Flomax. Orthostatic hypotension resolved with adjustment of metoprolol by Dr. Angie Banuelos   He takes Tambocor, metoprolol without new cardiac issues. Protonix continues without any new exacerbations of GERD. Lower extremity edema overall has actually improved to some degree with diuretics prescribed by Dr. Angie Banuelos      Physical examination today, 12/28/2022:    No evidence of palpable peripheral lymphadenopathy. Chronic rales bilaterally are noted in upper and lower lung fields anteriorly and posteriorly. Heart is regular normal S1 and S2, no tachycardia. Abdominal exam is benign.   Neurological exam is intact with intact mental status and nonfocal neurological exam.  Brief cranial nerves exam are all intact. CBC today 12/28/2022  reveals a WBC of 7.20 Hgb is 15.0 with an MCV of 96.4 and platelet count of 543,446. X-RAYS OF THE CHEST at [facility] on 9/28/2022: The right-sided MediPort tip overlying the projection of the superior vena cava  The aorta is calcified and tortuous  The heart is enlarged in size. There are diffuse interstitial opacities throughout the parenchyma bilaterally with a small right effusion. There is a nodular density in the right upper lung field. The RUL abnormality on the chest x-ray from today is likely residual scarring noted on the previous CT scan from November 2021. CT chest without contrast  at [facility] on 11/16/2022:COMPARISON: 11/15/2021, 8/25/2021  9 mm RIGHT upper lobe pulmonary nodule, previously 4 mm  3.0 x 1.9 cm spiculated mass in the RIGHT upper lobe abutting the major minor fissures, unchanged  Chronic interstitial thickening, bronchiectasis, and interstitial fibrosis is similar to multiple prior studies. Small RIGHT pleural effusion. No change in a few borderline prominent partially calcified mediastinal lymph nodes      CT ABDOMEN PELVIS WO CONTRAST at [facility] on 11/16/2022:COMPARISON: 11/15/2021   There is mild cardiomegaly with mitral annulus calcifications and coronary artery calcification  Trace pericardial effusion or pericardial thickening is present. There is interstitial fibrosis within the lung bases with honeycombing. There is a loculated effusion within the right lateral costophrenic angle stable from the previous exam  There are cysts involving the left kidney. More complex exophytic lesions involving the posterior midpole and the left lower pole may represent hemorrhagic cysts.   Anterolisthesis of L4 on L5 and L5 on S1 is present suggesting subluxation at the level of the facets at L4-L5  The prostate gland is mildly enlarged    CT imaging from 11/16/2022 does not disclose new or worrisome findings suggestive of new or recurrent malignancy. Examination is completely unremarkable for new problems. He continues to use oxygen however sparingly and only in the evenings. He is now driving himself back and forth to the doctor's office and wherever he needs to go. I will see Mr. Jimmy Gillette back in follow-up in 4 months with a chest x-ray prior to his return. #2   Anemia of chronic renal failure and chemotherapy associated anemia    Procrit 20,000 units weekly for Hgb < 11.0  is given as indicated to decrease transfusion requirements. I suspect he improved performance status has something to do with his hemoglobin 12.8 causing him less dyspnea on exertion and giving him a bit more stamina. Being off of chemotherapy for a year has been very beneficial to him. #3  TUMOR SCREENING AND HEALTH MAINTENANCE    GI cancer screening  Colonoscopy on 1/13/2019 by Dr. Wen Juan. Recall in 5 years    Prostate cancer screening    #4  Immunizations:  Immunization History   Administered Date(s) Administered    COVID-19, MODERNA BLUE border, Primary or Immunocompromised, (age 12y+), IM, 100 mcg/0.5mL 04/05/2021, 05/03/2021                     Yenny Romero was seen today for follow-up. Diagnoses and all orders for this visit:    Primary adenocarcinoma of upper lobe of right lung ([address]           No orders of the defined types were placed in this encounter. Return in about 4 months (around 4/28/2023) for Follow Up with Harry Liriano. Liam Kathleen

## 2022-12-28 ENCOUNTER — OFFICE VISIT (OUTPATIENT)
Dept: HEMATOLOGY | Age: 82
End: 2022-12-28
Payer: MEDICARE

## 2022-12-28 ENCOUNTER — HOSPITAL ENCOUNTER (OUTPATIENT)
Dept: INFUSION THERAPY | Age: 82
Discharge: HOME | End: 2022-12-28
Payer: MEDICARE

## 2022-12-28 VITALS
BODY MASS INDEX: 29.88 KG/M2 | SYSTOLIC BLOOD PRESSURE: 120 MMHG | HEIGHT: 64 IN | WEIGHT: 175 LBS | OXYGEN SATURATION: 92 % | DIASTOLIC BLOOD PRESSURE: 78 MMHG | HEART RATE: 91 BPM

## 2022-12-28 DIAGNOSIS — C34.11 PRIMARY ADENOCARCINOMA OF UPPER LOBE OF RIGHT LUNG (HCC): Primary | ICD-10-CM

## 2022-12-28 DIAGNOSIS — Z45.2 ENCOUNTER FOR CENTRAL LINE CARE: ICD-10-CM

## 2022-12-28 DIAGNOSIS — C34.90 NON-SMALL CELL LUNG CANCER, UNSPECIFIED LATERALITY (HCC): Primary | ICD-10-CM

## 2022-12-28 LAB
BASOPHILS ABSOLUTE: 0.06 K/UL (ref 0.01–0.08)
BASOPHILS RELATIVE PERCENT: 0.8 % (ref 0.1–1.2)
EOSINOPHILS ABSOLUTE: 0.16 K/UL (ref 0.04–0.54)
EOSINOPHILS RELATIVE PERCENT: 2.2 % (ref 0.7–7)
HCT VFR BLD CALC: 47.8 % (ref 40.1–51)
HEMOGLOBIN: 15 G/DL (ref 13.7–17.5)
LYMPHOCYTES ABSOLUTE: 1.38 K/UL (ref 1.18–3.74)
LYMPHOCYTES RELATIVE PERCENT: 19.2 % (ref 19.3–53.1)
MCH RBC QN AUTO: 30.2 PG (ref 25.7–32.2)
MCHC RBC AUTO-ENTMCNC: 31.4 G/DL (ref 32.3–36.5)
MCV RBC AUTO: 96.4 FL (ref 79–92.2)
MONOCYTES ABSOLUTE: 0.82 K/UL (ref 0.24–0.82)
MONOCYTES RELATIVE PERCENT: 11.4 % (ref 4.7–12.5)
NEUTROPHILS ABSOLUTE: 4.75 K/UL (ref 1.56–6.13)
NEUTROPHILS RELATIVE PERCENT: 66 % (ref 34–71.1)
PDW BLD-RTO: 14.6 % (ref 11.6–14.4)
PLATELET # BLD: 118 K/UL (ref 163–337)
PMV BLD AUTO: 12.4 FL (ref 7.4–10.4)
RBC # BLD: 4.96 M/UL (ref 4.63–6.08)
WBC # BLD: 7.2 K/UL (ref 4.23–9.07)

## 2022-12-28 PROCEDURE — 99212 OFFICE O/P EST SF 10 MIN: CPT

## 2022-12-28 PROCEDURE — 85025 COMPLETE CBC W/AUTO DIFF WBC: CPT

## 2022-12-28 PROCEDURE — 96523 IRRIG DRUG DELIVERY DEVICE: CPT

## 2022-12-28 PROCEDURE — 99214 OFFICE O/P EST MOD 30 MIN: CPT | Performed by: INTERNAL MEDICINE

## 2022-12-28 PROCEDURE — 3078F DIAST BP <80 MM HG: CPT | Performed by: INTERNAL MEDICINE

## 2022-12-28 PROCEDURE — 3074F SYST BP LT 130 MM HG: CPT | Performed by: INTERNAL MEDICINE

## 2022-12-28 PROCEDURE — 6360000002 HC RX W HCPCS: Performed by: INTERNAL MEDICINE

## 2022-12-28 PROCEDURE — G8417 CALC BMI ABV UP PARAM F/U: HCPCS | Performed by: INTERNAL MEDICINE

## 2022-12-28 PROCEDURE — G8427 DOCREV CUR MEDS BY ELIG CLIN: HCPCS | Performed by: INTERNAL MEDICINE

## 2022-12-28 PROCEDURE — 1123F ACP DISCUSS/DSCN MKR DOCD: CPT | Performed by: INTERNAL MEDICINE

## 2022-12-28 PROCEDURE — G8484 FLU IMMUNIZE NO ADMIN: HCPCS | Performed by: INTERNAL MEDICINE

## 2022-12-28 PROCEDURE — 1036F TOBACCO NON-USER: CPT | Performed by: INTERNAL MEDICINE

## 2022-12-28 PROCEDURE — 36415 COLL VENOUS BLD VENIPUNCTURE: CPT

## 2022-12-28 PROCEDURE — 2580000003 HC RX 258: Performed by: INTERNAL MEDICINE

## 2022-12-28 RX ORDER — HEPARIN SODIUM (PORCINE) LOCK FLUSH IV SOLN 100 UNIT/ML 100 UNIT/ML
500 SOLUTION INTRAVENOUS PRN
Status: DISCONTINUED | OUTPATIENT
Start: 2022-12-28 | End: 2022-12-29 | Stop reason: HOSPADM

## 2022-12-28 RX ORDER — SODIUM CHLORIDE 0.9 % (FLUSH) 0.9 %
10 SYRINGE (ML) INJECTION PRN
OUTPATIENT
Start: 2022-12-28

## 2022-12-28 RX ORDER — HEPARIN SODIUM (PORCINE) LOCK FLUSH IV SOLN 100 UNIT/ML 100 UNIT/ML
500 SOLUTION INTRAVENOUS PRN
OUTPATIENT
Start: 2022-12-28

## 2022-12-28 RX ORDER — SODIUM CHLORIDE 0.9 % (FLUSH) 0.9 %
10 SYRINGE (ML) INJECTION PRN
Status: DISCONTINUED | OUTPATIENT
Start: 2022-12-28 | End: 2022-12-29 | Stop reason: HOSPADM

## 2022-12-28 RX ORDER — SODIUM CHLORIDE 0.9 % (FLUSH) 0.9 %
20 SYRINGE (ML) INJECTION PRN
Status: DISCONTINUED | OUTPATIENT
Start: 2022-12-28 | End: 2022-12-29 | Stop reason: HOSPADM

## 2022-12-28 RX ORDER — SODIUM CHLORIDE 0.9 % (FLUSH) 0.9 %
20 SYRINGE (ML) INJECTION PRN
OUTPATIENT
Start: 2022-12-28

## 2022-12-28 RX ADMIN — Medication 500 UNITS: at 11:19

## 2022-12-28 RX ADMIN — Medication 1000 UNITS: at 12:19

## 2022-12-28 RX ADMIN — SODIUM CHLORIDE, PRESERVATIVE FREE 20 ML: 5 INJECTION INTRAVENOUS at 11:19

## 2022-12-28 RX ADMIN — SODIUM CHLORIDE, PRESERVATIVE FREE 20 ML: 5 INJECTION INTRAVENOUS at 12:16

## 2023-01-05 ENCOUNTER — APPOINTMENT (OUTPATIENT)
Dept: CT IMAGING | Facility: HOSPITAL | Age: 83
DRG: 309 | End: 2023-01-05
Payer: MEDICARE

## 2023-01-05 ENCOUNTER — HOSPITAL ENCOUNTER (INPATIENT)
Facility: HOSPITAL | Age: 83
LOS: 5 days | Discharge: HOME | DRG: 309 | End: 2023-01-10
Attending: INTERNAL MEDICINE | Admitting: FAMILY MEDICINE
Payer: MEDICARE

## 2023-01-05 ENCOUNTER — APPOINTMENT (OUTPATIENT)
Dept: GENERAL RADIOLOGY | Facility: HOSPITAL | Age: 83
DRG: 309 | End: 2023-01-05
Payer: MEDICARE

## 2023-01-05 DIAGNOSIS — I21.4 NSTEMI (NON-ST ELEVATED MYOCARDIAL INFARCTION): Primary | ICD-10-CM

## 2023-01-05 DIAGNOSIS — N18.9 CHRONIC KIDNEY DISEASE, UNSPECIFIED CKD STAGE: ICD-10-CM

## 2023-01-05 DIAGNOSIS — Z74.09 IMPAIRED MOBILITY: ICD-10-CM

## 2023-01-05 DIAGNOSIS — R10.9 ABDOMINAL PAIN, UNSPECIFIED ABDOMINAL LOCATION: ICD-10-CM

## 2023-01-05 DIAGNOSIS — R09.02 HYPOXIA: ICD-10-CM

## 2023-01-05 DIAGNOSIS — C80.1 ADENOCARCINOMA: ICD-10-CM

## 2023-01-05 LAB
A-A DO2: 50.5 MMHG
ALBUMIN SERPL-MCNC: 3.1 G/DL (ref 3.5–5.2)
ALBUMIN/GLOB SERPL: 0.9 G/DL
ALP SERPL-CCNC: 80 U/L (ref 39–117)
ALT SERPL W P-5'-P-CCNC: 15 U/L (ref 1–41)
ANION GAP SERPL CALCULATED.3IONS-SCNC: 11 MMOL/L (ref 5–15)
APTT PPP: 33.5 SECONDS (ref 24.1–35)
ARTERIAL PATENCY WRIST A: POSITIVE
AST SERPL-CCNC: 20 U/L (ref 1–40)
ATMOSPHERIC PRESS: 752 MMHG
BASE EXCESS BLDA CALC-SCNC: -1.5 MMOL/L (ref 0–2)
BASOPHILS # BLD AUTO: 0.02 10*3/MM3 (ref 0–0.2)
BASOPHILS NFR BLD AUTO: 0.1 % (ref 0–1.5)
BDY SITE: ABNORMAL
BILIRUB SERPL-MCNC: 0.6 MG/DL (ref 0–1.2)
BILIRUB UR QL STRIP: NEGATIVE
BODY TEMPERATURE: 37 C
BUN SERPL-MCNC: 50 MG/DL (ref 8–23)
BUN/CREAT SERPL: 19 (ref 7–25)
CALCIUM SPEC-SCNC: 9 MG/DL (ref 8.6–10.5)
CHLORIDE SERPL-SCNC: 102 MMOL/L (ref 98–107)
CK SERPL-CCNC: 148 U/L (ref 20–200)
CLARITY UR: CLEAR
CO2 SERPL-SCNC: 22 MMOL/L (ref 22–29)
COHGB MFR BLD: 2.3 % (ref 0–5)
COLOR UR: YELLOW
CREAT SERPL-MCNC: 2.63 MG/DL (ref 0.76–1.27)
D-LACTATE SERPL-SCNC: 2 MMOL/L (ref 0.5–2)
DEPRECATED RDW RBC AUTO: 50.2 FL (ref 37–54)
EGFRCR SERPLBLD CKD-EPI 2021: 23.8 ML/MIN/1.73
EOSINOPHIL # BLD AUTO: 0 10*3/MM3 (ref 0–0.4)
EOSINOPHIL NFR BLD AUTO: 0 % (ref 0.3–6.2)
ERYTHROCYTE [DISTWIDTH] IN BLOOD BY AUTOMATED COUNT: 14.5 % (ref 12.3–15.4)
FLUAV RNA RESP QL NAA+PROBE: NOT DETECTED
FLUBV RNA RESP QL NAA+PROBE: NOT DETECTED
GAS FLOW AIRWAY: 1 LPM
GLOBULIN UR ELPH-MCNC: 3.6 GM/DL
GLUCOSE SERPL-MCNC: 184 MG/DL (ref 65–99)
GLUCOSE UR STRIP-MCNC: NEGATIVE MG/DL
HCO3 BLDA-SCNC: 21.7 MMOL/L (ref 20–26)
HCT VFR BLD AUTO: 41.1 % (ref 37.5–51)
HCT VFR BLD CALC: 41.1 % (ref 38–51)
HGB BLD-MCNC: 13.3 G/DL (ref 13–17.7)
HGB BLDA-MCNC: 13.4 G/DL (ref 14–18)
HGB UR QL STRIP.AUTO: NEGATIVE
IMM GRANULOCYTES # BLD AUTO: 0.28 10*3/MM3 (ref 0–0.05)
IMM GRANULOCYTES NFR BLD AUTO: 1.4 % (ref 0–0.5)
INR PPP: 1.29 (ref 0.91–1.09)
KETONES UR QL STRIP: NEGATIVE
LEUKOCYTE ESTERASE UR QL STRIP.AUTO: NEGATIVE
LIPASE SERPL-CCNC: 9 U/L (ref 13–60)
LYMPHOCYTES # BLD AUTO: 0.37 10*3/MM3 (ref 0.7–3.1)
LYMPHOCYTES NFR BLD AUTO: 1.9 % (ref 19.6–45.3)
Lab: ABNORMAL
MAGNESIUM SERPL-MCNC: 1.8 MG/DL (ref 1.6–2.4)
MCH RBC QN AUTO: 30.2 PG (ref 26.6–33)
MCHC RBC AUTO-ENTMCNC: 32.4 G/DL (ref 31.5–35.7)
MCV RBC AUTO: 93.4 FL (ref 79–97)
METHGB BLD QL: 0.5 % (ref 0–3)
MODALITY: ABNORMAL
MONOCYTES # BLD AUTO: 0.95 10*3/MM3 (ref 0.1–0.9)
MONOCYTES NFR BLD AUTO: 4.8 % (ref 5–12)
NEUTROPHILS NFR BLD AUTO: 18.37 10*3/MM3 (ref 1.7–7)
NEUTROPHILS NFR BLD AUTO: 91.8 % (ref 42.7–76)
NITRITE UR QL STRIP: NEGATIVE
NOTE: ABNORMAL
NRBC BLD AUTO-RTO: 0 /100 WBC (ref 0–0.2)
NT-PROBNP SERPL-MCNC: 753.2 PG/ML (ref 0–1800)
OXYHGB MFR BLDV: 90.5 % (ref 94–99)
PCO2 BLDA: 31.5 MM HG (ref 35–45)
PCO2 TEMP ADJ BLD: 31.5 MM HG (ref 35–45)
PH BLDA: 7.45 PH UNITS (ref 7.35–7.45)
PH UR STRIP.AUTO: 8 [PH] (ref 5–8)
PH, TEMP CORRECTED: 7.45 PH UNITS (ref 7.35–7.45)
PLATELET # BLD AUTO: 128 10*3/MM3 (ref 140–450)
PMV BLD AUTO: 12.6 FL (ref 6–12)
PO2 BLDA: 61.9 MM HG (ref 83–108)
PO2 TEMP ADJ BLD: 61.9 MM HG (ref 83–108)
POTASSIUM BLDA-SCNC: 4.2 MMOL/L (ref 3.5–5.2)
POTASSIUM SERPL-SCNC: 4.3 MMOL/L (ref 3.5–5.2)
PROT SERPL-MCNC: 6.7 G/DL (ref 6–8.5)
PROT UR QL STRIP: ABNORMAL
PROTHROMBIN TIME: 16.2 SECONDS (ref 11.8–14.8)
RBC # BLD AUTO: 4.4 10*6/MM3 (ref 4.14–5.8)
SAO2 % BLDCOA: 93.2 % (ref 94–99)
SARS-COV-2 RNA RESP QL NAA+PROBE: NOT DETECTED
SODIUM BLDA-SCNC: 135 MMOL/L (ref 136–145)
SODIUM SERPL-SCNC: 135 MMOL/L (ref 136–145)
SP GR UR STRIP: 1.01 (ref 1–1.03)
TROPONIN T SERPL-MCNC: 0.05 NG/ML (ref 0–0.03)
TROPONIN T SERPL-MCNC: 0.07 NG/ML (ref 0–0.03)
UROBILINOGEN UR QL STRIP: ABNORMAL
VENTILATOR MODE: ABNORMAL
WBC NRBC COR # BLD: 19.99 10*3/MM3 (ref 3.4–10.8)

## 2023-01-05 PROCEDURE — 82805 BLOOD GASES W/O2 SATURATION: CPT

## 2023-01-05 PROCEDURE — 82550 ASSAY OF CK (CPK): CPT | Performed by: NURSE PRACTITIONER

## 2023-01-05 PROCEDURE — 36600 WITHDRAWAL OF ARTERIAL BLOOD: CPT

## 2023-01-05 PROCEDURE — 83690 ASSAY OF LIPASE: CPT | Performed by: NURSE PRACTITIONER

## 2023-01-05 PROCEDURE — 36415 COLL VENOUS BLD VENIPUNCTURE: CPT

## 2023-01-05 PROCEDURE — 74176 CT ABD & PELVIS W/O CONTRAST: CPT

## 2023-01-05 PROCEDURE — 85730 THROMBOPLASTIN TIME PARTIAL: CPT | Performed by: NURSE PRACTITIONER

## 2023-01-05 PROCEDURE — 93005 ELECTROCARDIOGRAM TRACING: CPT | Performed by: NURSE PRACTITIONER

## 2023-01-05 PROCEDURE — 81003 URINALYSIS AUTO W/O SCOPE: CPT | Performed by: NURSE PRACTITIONER

## 2023-01-05 PROCEDURE — 85025 COMPLETE CBC W/AUTO DIFF WBC: CPT | Performed by: NURSE PRACTITIONER

## 2023-01-05 PROCEDURE — 80053 COMPREHEN METABOLIC PANEL: CPT | Performed by: NURSE PRACTITIONER

## 2023-01-05 PROCEDURE — 84484 ASSAY OF TROPONIN QUANT: CPT | Performed by: NURSE PRACTITIONER

## 2023-01-05 PROCEDURE — 93010 ELECTROCARDIOGRAM REPORT: CPT | Performed by: INTERNAL MEDICINE

## 2023-01-05 PROCEDURE — P9612 CATHETERIZE FOR URINE SPEC: HCPCS

## 2023-01-05 PROCEDURE — 83050 HGB METHEMOGLOBIN QUAN: CPT

## 2023-01-05 PROCEDURE — 85610 PROTHROMBIN TIME: CPT | Performed by: NURSE PRACTITIONER

## 2023-01-05 PROCEDURE — 83735 ASSAY OF MAGNESIUM: CPT | Performed by: NURSE PRACTITIONER

## 2023-01-05 PROCEDURE — 87040 BLOOD CULTURE FOR BACTERIA: CPT | Performed by: NURSE PRACTITIONER

## 2023-01-05 PROCEDURE — 83605 ASSAY OF LACTIC ACID: CPT | Performed by: NURSE PRACTITIONER

## 2023-01-05 PROCEDURE — 25010000002 CEFEPIME PER 500 MG: Performed by: NURSE PRACTITIONER

## 2023-01-05 PROCEDURE — 82375 ASSAY CARBOXYHB QUANT: CPT

## 2023-01-05 PROCEDURE — 71045 X-RAY EXAM CHEST 1 VIEW: CPT

## 2023-01-05 PROCEDURE — 99285 EMERGENCY DEPT VISIT HI MDM: CPT

## 2023-01-05 PROCEDURE — 83880 ASSAY OF NATRIURETIC PEPTIDE: CPT | Performed by: NURSE PRACTITIONER

## 2023-01-05 PROCEDURE — 87636 SARSCOV2 & INF A&B AMP PRB: CPT | Performed by: NURSE PRACTITIONER

## 2023-01-05 RX ORDER — LOSARTAN POTASSIUM 50 MG/1
50 TABLET ORAL DAILY
COMMUNITY

## 2023-01-05 RX ORDER — ENOXAPARIN SODIUM 100 MG/ML
30 INJECTION SUBCUTANEOUS DAILY
Status: DISCONTINUED | OUTPATIENT
Start: 2023-01-06 | End: 2023-01-10 | Stop reason: HOSPADM

## 2023-01-05 RX ORDER — MULTIPLE VITAMINS W/ MINERALS TAB 9MG-400MCG
1 TAB ORAL DAILY
COMMUNITY

## 2023-01-05 RX ORDER — SODIUM CHLORIDE 0.9 % (FLUSH) 0.9 %
10 SYRINGE (ML) INJECTION EVERY 12 HOURS SCHEDULED
Status: DISCONTINUED | OUTPATIENT
Start: 2023-01-05 | End: 2023-01-10 | Stop reason: HOSPADM

## 2023-01-05 RX ORDER — LANOLIN ALCOHOL/MO/W.PET/CERES
6 CREAM (GRAM) TOPICAL NIGHTLY PRN
Status: DISCONTINUED | OUTPATIENT
Start: 2023-01-05 | End: 2023-01-10 | Stop reason: HOSPADM

## 2023-01-05 RX ORDER — SODIUM CHLORIDE, SODIUM LACTATE, POTASSIUM CHLORIDE, CALCIUM CHLORIDE 600; 310; 30; 20 MG/100ML; MG/100ML; MG/100ML; MG/100ML
50 INJECTION, SOLUTION INTRAVENOUS CONTINUOUS
Status: DISCONTINUED | OUTPATIENT
Start: 2023-01-05 | End: 2023-01-09

## 2023-01-05 RX ORDER — SODIUM CHLORIDE 0.9 % (FLUSH) 0.9 %
10 SYRINGE (ML) INJECTION AS NEEDED
Status: DISCONTINUED | OUTPATIENT
Start: 2023-01-05 | End: 2023-01-10 | Stop reason: HOSPADM

## 2023-01-05 RX ORDER — ONDANSETRON 2 MG/ML
4 INJECTION INTRAMUSCULAR; INTRAVENOUS EVERY 6 HOURS PRN
Status: DISCONTINUED | OUTPATIENT
Start: 2023-01-05 | End: 2023-01-10 | Stop reason: HOSPADM

## 2023-01-05 RX ORDER — LANOLIN ALCOHOL/MO/W.PET/CERES
5 CREAM (GRAM) TOPICAL NIGHTLY PRN
Status: DISCONTINUED | OUTPATIENT
Start: 2023-01-05 | End: 2023-01-05

## 2023-01-05 RX ORDER — SODIUM CHLORIDE 9 MG/ML
40 INJECTION, SOLUTION INTRAVENOUS AS NEEDED
Status: DISCONTINUED | OUTPATIENT
Start: 2023-01-05 | End: 2023-01-10 | Stop reason: HOSPADM

## 2023-01-05 RX ORDER — SODIUM CHLORIDE 0.9 % (FLUSH) 0.9 %
10 SYRINGE (ML) INJECTION AS NEEDED
Status: DISCONTINUED | OUTPATIENT
Start: 2023-01-05 | End: 2023-01-06 | Stop reason: SDUPTHER

## 2023-01-05 RX ORDER — FUROSEMIDE 20 MG/1
20 TABLET ORAL 2 TIMES DAILY
COMMUNITY

## 2023-01-05 RX ORDER — ACETAMINOPHEN 325 MG/1
650 TABLET ORAL EVERY 4 HOURS PRN
Status: DISCONTINUED | OUTPATIENT
Start: 2023-01-05 | End: 2023-01-10 | Stop reason: HOSPADM

## 2023-01-05 RX ADMIN — SODIUM CHLORIDE 500 ML: 9 INJECTION, SOLUTION INTRAVENOUS at 14:50

## 2023-01-05 RX ADMIN — VANCOMYCIN HYDROCHLORIDE 1500 MG: 10 INJECTION, POWDER, LYOPHILIZED, FOR SOLUTION INTRAVENOUS at 23:44

## 2023-01-05 RX ADMIN — ACETAMINOPHEN 650 MG: 325 TABLET, FILM COATED ORAL at 22:35

## 2023-01-05 RX ADMIN — CEFEPIME 2 G: 2 INJECTION, POWDER, FOR SOLUTION INTRAVENOUS at 17:31

## 2023-01-05 RX ADMIN — SODIUM CHLORIDE, POTASSIUM CHLORIDE, SODIUM LACTATE AND CALCIUM CHLORIDE 50 ML/HR: 600; 310; 30; 20 INJECTION, SOLUTION INTRAVENOUS at 22:35

## 2023-01-05 RX ADMIN — Medication 6 MG: at 22:35

## 2023-01-05 RX ADMIN — SODIUM CHLORIDE 500 ML: 9 INJECTION, SOLUTION INTRAVENOUS at 16:51

## 2023-01-05 RX ADMIN — Medication 10 ML: at 22:43

## 2023-01-05 NOTE — ED PROVIDER NOTES
Subjective   History of Present Illness  Patient is an 82-year-old male who presents to the ER via EMS due to generalized weakness.  Patient states yesterday he woke up experiencing chills and shaking.  He reports congestion without any cough or recorded fevers.  He states he has not eaten much due to just feeling poorly.  He reports feeling weak and fatigued.  Patient states he has had suprapubic discomfort at times without any dysuria or hematuria.  Today while walking to the bathroom he states he tripped on his own feet and fell in his bedroom.  He denies hitting his head or loss of consciousness.  He denies any pain from the fall.  He denies any hip pain or back pain.  Denies any saddle anesthesia or loss of bowel or bladder control.  He states however he was so weak he was unable to get up on his own.  Patient's brother happened to stop by approximately 15 minutes after he fell and had to help him get up.  Past medical history significant for arthritis, atrial fibrillation, blindness of the left eye, BPH, adenocarcinoma of the lung, CHF, chronic kidney disease, coronary artery disease, copd, hyperlipidemia, hypertension, gastroesophageal reflux disease, hydronephrosis of left kidney, MGUS, pancreatic mass.    Patient states he is followed by Dr. Michele for history of adenocarcinoma.  He states he has not received chemotherapy for 2 years.    Patient's blood pressure is low, he is being evaluated in the hallway. We will move him to next room available.         Review of Systems   Constitutional: Positive for fatigue. Negative for fever.   HENT: Positive for congestion.    Eyes: Negative.    Respiratory: Negative.  Negative for cough and shortness of breath.    Cardiovascular: Negative.  Negative for chest pain.   Gastrointestinal: Positive for abdominal pain. Negative for diarrhea, nausea and vomiting.   Genitourinary: Negative.  Negative for difficulty urinating, dysuria and hematuria.   Musculoskeletal:  Negative.  Negative for back pain.   Skin: Negative.  Negative for rash and wound.   Neurological: Positive for weakness.   All other systems reviewed and are negative.      Past Medical History:   Diagnosis Date   • Arthritis    • Atrial fibrillation with rapid ventricular response (HCC) 5/31/2019   • Blindness of left eye    • BPH with obstruction/lower urinary tract symptoms    • Cancer (HCC)     stomach & lung   • CHF (congestive heart failure) (HCC)    • CKD (chronic kidney disease) stage 3, GFR 30-59 ml/min (HCC)    • Coronary artery disease    • Elevated cholesterol    • Essential hypertension 10/2/2017   • Fibrotic lung diseases (HCC)    • GERD (gastroesophageal reflux disease)    • Hearing loss    • History of transfusion    • Hydronephrosis of left kidney    • Hyperlipidemia    • Hypertension     pt was taken off of all of his medications for BP (atenolol, lisinopril, lasix) because his BP kept bottoming out so his primary dr told him to discontinue them 1-2 months ago (Jan/Feb 2019). pt states he takes no medications currently.   • Mass of duodenum versus letty hepatis  4/27/2019   • Mass of left renal hilum  4/27/2019   • Mass of upper lobe of right lung 02/2019    mass is shrinking on its own, so pt states Dr. Patel is just going to keep an eye on it and not do surgery right now.   • Mediastinal adenopathy 10/24/2018    [facility]   • Monoclonal gammopathy of unknown significance (MGUS) 9/11/2018   • Pancreatic mass     pt states he had this in 2013 but it went away on its own. Now recent CT shows it has come back so he is going to have an ultrasound on 3/13/19.   • Shortness of breath        Allergies   Allergen Reactions   • Penicillins Hives       Past Surgical History:   Procedure Laterality Date   • ABDOMINAL SURGERY     • BRONCHOSCOPY N/A 10/24/2018    Procedure: BRONCHOSCOPY WITH BIOPSY, EBUS;  Surgeon: Gareth Becerra MD;  Location: [facility] OR;  Service: Pulmonary   • CHOLECYSTECTOMY     •  COLONOSCOPY N/A 1/3/2019    Procedure: COLONOSCOPY WITH ANESTHESIA;  Surgeon: Randy Somers DO;  Location: [facility] ENDOSCOPY;  Service: Gastroenterology   • CYSTOSCOPY, RETROGRADE PYELOGRAM AND STENT INSERTION Left 3/8/2019    Procedure: CYSTOSCOPY RETROGRADE BILATERAL PYELOGRAM;  Surgeon: Jos Sylvester MD;  Location: [facility] OR;  Service: Urology   • ENDOSCOPY N/A 2018    Procedure: ESOPHAGOGASTRODUODENOSCOPY WITH ANESTHESIA;  Surgeon: Randy Somers DO;  Location: [facility] ENDOSCOPY;  Service: Gastroenterology   • ENDOSCOPY N/A 2019    Procedure: ESOPHAGOGASTRODUODENOSCOPY WITH ANESTHESIA;  Surgeon: Lilliam Jj MD;  Location: [facility] OR;  Service: Gastroenterology   • ENDOSCOPY N/A 2019    Procedure: ESOPHAGOGASTRODUODENOSCOPY WITH ANESTHESIA;  Surgeon: Pilo Bansal MD;  Location: [facility] ENDOSCOPY;  Service: Gastroenterology   • EYE SURGERY Left    • FOOT SURGERY Right     joint   • FRACTURE SURGERY         Family History   Problem Relation Age of Onset   • Hypertension Mother    • Leukemia Father        Social History     Socioeconomic History   • Marital status: Single   Tobacco Use   • Smoking status: Former     Years: 49.00     Types: Cigarettes     Quit date: 10/29/2003     Years since quittin.2   • Smokeless tobacco: Former     Types: Chew     Quit date:    Substance and Sexual Activity   • Alcohol use: No   • Drug use: No   • Sexual activity: Defer           Objective   Physical Exam  Vitals and nursing note reviewed.   Constitutional:       General: He is not in acute distress.     Appearance: He is well-developed. He is not diaphoretic.   HENT:      Head: Normocephalic and atraumatic.      Right Ear: External ear normal.      Left Ear: External ear normal.      Nose: Nose normal.      Mouth/Throat:      Pharynx: Oropharynx is clear.   Eyes:      General: No scleral icterus.     Conjunctiva/sclera: Conjunctivae normal.      Comments: Left eye blindness    Neck:      Thyroid: No thyromegaly.      Vascular: No JVD.   Cardiovascular:      Rate and Rhythm: Normal rate and regular rhythm.      Heart sounds: Normal heart sounds. No murmur heard.  Pulmonary:      Effort: Pulmonary effort is normal. No respiratory distress.      Breath sounds: Normal breath sounds. No wheezing or rales.   Chest:      Chest wall: No tenderness.   Abdominal:      General: Bowel sounds are normal. There is no distension.      Palpations: Abdomen is soft. There is no mass.      Tenderness: There is abdominal tenderness. There is no guarding or rebound.   Musculoskeletal:         General: Normal range of motion.      Cervical back: Normal range of motion and neck supple.   Lymphadenopathy:      Cervical: No cervical adenopathy.   Skin:     General: Skin is warm and dry.      Capillary Refill: Capillary refill takes less than 2 seconds.      Coloration: Skin is not pale.      Findings: No erythema or rash.   Neurological:      Mental Status: He is alert and oriented to person, place, and time.      Cranial Nerves: No cranial nerve deficit.      Coordination: Coordination normal.      Deep Tendon Reflexes: Reflexes are normal and symmetric.   Psychiatric:         Mood and Affect: Mood normal.         Behavior: Behavior normal.         Thought Content: Thought content normal.         Judgment: Judgment normal.         Procedures           ED Course dr. Rojas evaluated patient as well. Patient had pelvic/suprapubic and right abdominal discomfort on her exam as well. We ordered ct scan without contrast given renal function. Patient was moved from hallway into room. He received 1 liter of iv fluids and cefepime for possible sepsis. troponins were elevated, patient remains without any chest pain. Discussed case with hospitalist who is agreeable for admission.   Labs Reviewed   COMPREHENSIVE METABOLIC PANEL - Abnormal; Notable for the following components:       Result Value    Glucose 184 (*)     BUN  50 (*)     Creatinine 2.63 (*)     Sodium 135 (*)     Albumin 3.1 (*)     eGFR 23.8 (*)     All other components within normal limits    Narrative:     GFR Normal >60  Chronic Kidney Disease <60  Kidney Failure <15    The GFR formula is only valid for adults with stable renal function between ages 18 and 70.   LIPASE - Abnormal; Notable for the following components:    Lipase 9 (*)     All other components within normal limits   URINALYSIS W/ CULTURE IF INDICATED - Abnormal; Notable for the following components:    Protein, UA Trace (*)     All other components within normal limits    Narrative:     In absence of clinical symptoms, the presence of pyuria, bacteria, and/or nitrites on the urinalysis result does not correlate with infection.  Urine microscopic not indicated.   PROTIME-INR - Abnormal; Notable for the following components:    Protime 16.2 (*)     INR 1.29 (*)     All other components within normal limits   TROPONIN (IN-HOUSE) - Abnormal; Notable for the following components:    Troponin T 0.051 (*)     All other components within normal limits    Narrative:     Troponin T Reference Range:  <= 0.03 ng/mL-   Negative for AMI  >0.03 ng/mL-     Abnormal for myocardial necrosis.  Clinicians would have to utilize clinical acumen, EKG, Troponin and serial changes to determine if it is an Acute Myocardial Infarction or myocardial injury due to an underlying chronic condition.       Results may be falsely decreased if patient taking Biotin.     CBC WITH AUTO DIFFERENTIAL - Abnormal; Notable for the following components:    WBC 19.99 (*)     MPV 12.6 (*)     Platelets 128 (*)     Neutrophil % 91.8 (*)     Lymphocyte % 1.9 (*)     Monocyte % 4.8 (*)     Eosinophil % 0.0 (*)     Immature Grans % 1.4 (*)     Neutrophils, Absolute 18.37 (*)     Lymphocytes, Absolute 0.37 (*)     Monocytes, Absolute 0.95 (*)     Immature Grans, Absolute 0.28 (*)     All other components within normal limits   BLOOD GAS, ARTERIAL  W/CO-OXIMETRY - Abnormal; Notable for the following components:    pCO2, Arterial 31.5 (*)     pO2, Arterial 61.9 (*)     Base Excess, Arterial -1.5 (*)     O2 Saturation, Arterial 93.2 (*)     Hemoglobin, Blood Gas 13.4 (*)     Oxyhemoglobin 90.5 (*)     Sodium, Arterial 135 (*)     pCO2, Temperature Corrected 31.5 (*)     pO2, Temperature Corrected 61.9 (*)     All other components within normal limits   TROPONIN (IN-HOUSE) - Abnormal; Notable for the following components:    Troponin T 0.065 (*)     All other components within normal limits    Narrative:     Troponin T Reference Range:  <= 0.03 ng/mL-   Negative for AMI  >0.03 ng/mL-     Abnormal for myocardial necrosis.  Clinicians would have to utilize clinical acumen, EKG, Troponin and serial changes to determine if it is an Acute Myocardial Infarction or myocardial injury due to an underlying chronic condition.       Results may be falsely decreased if patient taking Biotin.     COVID-19 AND FLU A/B, NP SWAB IN TRANSPORT MEDIA 8-12 HR TAT - Normal    Narrative:     Fact sheet for providers: https://www.fda.gov/media/961872/download    Fact sheet for patients: https://www.fda.gov/media/931907/download    Test performed by PCR.   APTT - Normal   CK - Normal   MAGNESIUM - Normal   BNP (IN-HOUSE) - Normal    Narrative:     Among patients with dyspnea, NT-proBNP is highly sensitive for the detection of acute congestive heart failure. In addition NT-proBNP of <300 pg/ml effectively rules out acute congestive heart failure with 99% negative predictive value.     LACTIC ACID, PLASMA - Normal   BLOOD CULTURE   BLOOD CULTURE   BLOOD GAS, ARTERIAL W/CO-OXIMETRY   CBC AND DIFFERENTIAL    Narrative:     The following orders were created for panel order CBC & Differential.  Procedure                               Abnormality         Status                     ---------                               -----------         ------                     CBC Auto  Differential[[id number]]        Abnormal            Final result                 Please view results for these tests on the individual orders.     ED Course as of 01/05/23 2032 Thu Jan 05, 2023 2003 IMPRESSION:  1. Mild constipation. Otherwise normal bowel gas pattern.  2. Right-sided pleural effusion which appears loculated with associated  pleural thickening. The pleural thickening is somewhat nodular in  appearance and if the patient has a prior history of lung neoplasm could  potentially represent pleural-based studding. This shows some  progression from the previous examination. The effusion shows slight  interval increase in size. There is interstitial fibrosis within both  lung bases.  3. Mild aneurysmal dilatation of the ascending thoracic aorta. Heavy  coronary calcifications are present. There is no pericardial effusion.  4. Cortical cysts of both kidneys. There are again 2 lesions within the  left kidney which are more indeterminate but which are stable from the  previous exam. There is nonobstructing left-sided nephrolithiasis. No  evidence of ureteral stone or obstructive uropathy. No free fluid or  localized inflammatory process is demonstrated.  5. Small bilateral fat-containing inguinal hernias. The prostate gland  is enlarged.  6. Listhesis at the L4-L5 and L5-S1 level..  [TW]      ED Course User Index  [TW] Lucia Gonzalez APRN                                           Medical Decision Making  Patient is an 82-year-old male who presents to the ER via EMS due to generalized weakness.  Patient states yesterday he woke up experiencing chills and shaking.  He reports congestion without any cough or recorded fevers.  He states he has not eaten much due to just feeling poorly.  He reports feeling weak and fatigued.  Patient states he has had suprapubic discomfort at times without any dysuria or hematuria.  Today while walking to the bathroom he states he tripped on his own feet and fell in his  bedroom.  He denies hitting his head or loss of consciousness.  He denies any pain from the fall.  He denies any hip pain or back pain.  Denies any saddle anesthesia or loss of bowel or bladder control.  He states however he was so weak he was unable to get up on his own.  Patient's brother happened to stop by approximately 15 minutes after he fell and had to help him get up.  Past medical history significant for arthritis, atrial fibrillation, blindness of the left eye, BPH, adenocarcinoma of the lung, CHF, chronic kidney disease, coronary artery disease, copd, hyperlipidemia, hypertension, gastroesophageal reflux disease, hydronephrosis of left kidney, MGUS, pancreatic mass.    Patient states he is followed by Dr. Michele for history of adenocarcinoma.  He states he has not received chemotherapy for 2 years.  Differential diagnosis: sepsis, influenza, covid, uti, progression of cancer, pneumonia, appendicitis, colitis, diverticulitis, MI, and other    Abdominal pain, unspecified abdominal location: acute illness or injury  Adenocarcinoma (HCC): acute illness or injury  Chronic kidney disease, unspecified CKD stage: acute illness or injury  NSTEMI (non-ST elevated myocardial infarction) (HCC): chronic illness or injury  Amount and/or Complexity of Data Reviewed  Labs: ordered. Decision-making details documented in ED Course.  Radiology: ordered.  ECG/medicine tests: ordered. Decision-making details documented in ED Course.  Discussion of management or test interpretation with external provider(s): Discussed case with hospitalist who is agreeable for admission.     Risk  Prescription drug management.  Decision regarding hospitalization.          Final diagnoses:   NSTEMI (non-ST elevated myocardial infarction) (HCC)   Chronic kidney disease, unspecified CKD stage   Adenocarcinoma (HCC)   Abdominal pain, unspecified abdominal location       ED Disposition  ED Disposition     ED Disposition   Decision to Admit     Condition   --    Comment   Level of Care: Telemetry [5]   Diagnosis: NSTEMI (non-ST elevated myocardial infarction) ([facility]) [[id number]]   Admitting Physician: BLANCA SCHMITZ [[id number]]   Certification: I Certify That Inpatient Hospital Services Are Medically Necessary For Greater Than 2 Midnights               No follow-up provider specified.       Medication List      No changes were made to your prescriptions during this visit.          Lucia Gonzalez, APRN  01/05/23 2031

## 2023-01-06 ENCOUNTER — APPOINTMENT (OUTPATIENT)
Dept: CARDIOLOGY | Facility: HOSPITAL | Age: 83
DRG: 309 | End: 2023-01-06
Payer: MEDICARE

## 2023-01-06 PROBLEM — I21.4 NSTEMI (NON-ST ELEVATED MYOCARDIAL INFARCTION) (HCC): Status: RESOLVED | Noted: 2023-01-05 | Resolved: 2023-01-06

## 2023-01-06 LAB
ALBUMIN SERPL-MCNC: 2.8 G/DL (ref 3.5–5.2)
ALBUMIN/GLOB SERPL: 0.8 G/DL
ALP SERPL-CCNC: 78 U/L (ref 39–117)
ALT SERPL W P-5'-P-CCNC: 13 U/L (ref 1–41)
ANION GAP SERPL CALCULATED.3IONS-SCNC: 7 MMOL/L (ref 5–15)
AST SERPL-CCNC: 24 U/L (ref 1–40)
BASOPHILS # BLD AUTO: 0.03 10*3/MM3 (ref 0–0.2)
BASOPHILS NFR BLD AUTO: 0.2 % (ref 0–1.5)
BH CV ECHO MEAS - AO MAX PG: 8.6 MMHG
BH CV ECHO MEAS - AO MEAN PG: 4 MMHG
BH CV ECHO MEAS - AO ROOT DIAM: 3.4 CM
BH CV ECHO MEAS - AO V2 MAX: 147 CM/SEC
BH CV ECHO MEAS - AO V2 VTI: 21.7 CM
BH CV ECHO MEAS - AVA(I,D): 4.2 CM2
BH CV ECHO MEAS - EDV(CUBED): 59.3 ML
BH CV ECHO MEAS - EDV(MOD-SP4): 60.2 ML
BH CV ECHO MEAS - EF(MOD-SP4): 47.8 %
BH CV ECHO MEAS - ESV(CUBED): 12.2 ML
BH CV ECHO MEAS - ESV(MOD-SP4): 31.4 ML
BH CV ECHO MEAS - FS: 41 %
BH CV ECHO MEAS - IVS/LVPW: 1.13 CM
BH CV ECHO MEAS - IVSD: 0.9 CM
BH CV ECHO MEAS - LA DIMENSION: 3.8 CM
BH CV ECHO MEAS - LAT PEAK E' VEL: 7 CM/SEC
BH CV ECHO MEAS - LV DIASTOLIC VOL/BSA (35-75): 32.7 CM2
BH CV ECHO MEAS - LV MASS(C)D: 97.4 GRAMS
BH CV ECHO MEAS - LV MAX PG: 5 MMHG
BH CV ECHO MEAS - LV MEAN PG: 3 MMHG
BH CV ECHO MEAS - LV SYSTOLIC VOL/BSA (12-30): 17 CM2
BH CV ECHO MEAS - LV V1 MAX: 112 CM/SEC
BH CV ECHO MEAS - LV V1 VTI: 20.2 CM
BH CV ECHO MEAS - LVIDD: 3.9 CM
BH CV ECHO MEAS - LVIDS: 2.3 CM
BH CV ECHO MEAS - LVOT AREA: 4.5 CM2
BH CV ECHO MEAS - LVOT DIAM: 2.4 CM
BH CV ECHO MEAS - LVPWD: 0.8 CM
BH CV ECHO MEAS - MED PEAK E' VEL: 4.1 CM/SEC
BH CV ECHO MEAS - MV A MAX VEL: 115 CM/SEC
BH CV ECHO MEAS - MV DEC TIME: 0.2 MSEC
BH CV ECHO MEAS - MV E MAX VEL: 62.6 CM/SEC
BH CV ECHO MEAS - MV E/A: 0.54
BH CV ECHO MEAS - PA V2 MAX: 54.8 CM/SEC
BH CV ECHO MEAS - RAP SYSTOLE: 10 MMHG
BH CV ECHO MEAS - RVSP: 58 MMHG
BH CV ECHO MEAS - SI(MOD-SP4): 15.6 ML/M2
BH CV ECHO MEAS - SV(LVOT): 91.4 ML
BH CV ECHO MEAS - SV(MOD-SP4): 28.8 ML
BH CV ECHO MEAS - TR MAX PG: 42.5 MMHG
BH CV ECHO MEAS - TR MAX VEL: 326 CM/SEC
BH CV ECHO MEASUREMENTS AVERAGE E/E' RATIO: 11.28
BILIRUB SERPL-MCNC: 0.6 MG/DL (ref 0–1.2)
BUN SERPL-MCNC: 49 MG/DL (ref 8–23)
BUN/CREAT SERPL: 20.7 (ref 7–25)
CALCIUM SPEC-SCNC: 9.2 MG/DL (ref 8.6–10.5)
CHLORIDE SERPL-SCNC: 103 MMOL/L (ref 98–107)
CHOLEST SERPL-MCNC: 83 MG/DL (ref 0–200)
CO2 SERPL-SCNC: 21 MMOL/L (ref 22–29)
CREAT SERPL-MCNC: 2.37 MG/DL (ref 0.76–1.27)
DEPRECATED RDW RBC AUTO: 50.9 FL (ref 37–54)
EGFRCR SERPLBLD CKD-EPI 2021: 27 ML/MIN/1.73
EOSINOPHIL # BLD AUTO: 0.06 10*3/MM3 (ref 0–0.4)
EOSINOPHIL NFR BLD AUTO: 0.4 % (ref 0.3–6.2)
ERYTHROCYTE [DISTWIDTH] IN BLOOD BY AUTOMATED COUNT: 14.8 % (ref 12.3–15.4)
GLOBULIN UR ELPH-MCNC: 3.6 GM/DL
GLUCOSE SERPL-MCNC: 89 MG/DL (ref 65–99)
HBA1C MFR BLD: 5.5 % (ref 4.8–5.6)
HCT VFR BLD AUTO: 39 % (ref 37.5–51)
HDLC SERPL-MCNC: 26 MG/DL (ref 40–60)
HGB BLD-MCNC: 12.4 G/DL (ref 13–17.7)
IMM GRANULOCYTES # BLD AUTO: 0.13 10*3/MM3 (ref 0–0.05)
IMM GRANULOCYTES NFR BLD AUTO: 0.9 % (ref 0–0.5)
LDLC SERPL CALC-MCNC: 38 MG/DL (ref 0–100)
LDLC/HDLC SERPL: 1.47 {RATIO}
LEFT ATRIUM VOLUME INDEX: 18.2 ML/M2
LEFT ATRIUM VOLUME: 33.5 ML
LYMPHOCYTES # BLD AUTO: 0.91 10*3/MM3 (ref 0.7–3.1)
LYMPHOCYTES NFR BLD AUTO: 6.2 % (ref 19.6–45.3)
MAGNESIUM SERPL-MCNC: 1.8 MG/DL (ref 1.6–2.4)
MAXIMAL PREDICTED HEART RATE: 140 BPM
MCH RBC QN AUTO: 29.8 PG (ref 26.6–33)
MCHC RBC AUTO-ENTMCNC: 31.8 G/DL (ref 31.5–35.7)
MCV RBC AUTO: 93.8 FL (ref 79–97)
MONOCYTES # BLD AUTO: 0.96 10*3/MM3 (ref 0.1–0.9)
MONOCYTES NFR BLD AUTO: 6.5 % (ref 5–12)
NEUTROPHILS NFR BLD AUTO: 12.6 10*3/MM3 (ref 1.7–7)
NEUTROPHILS NFR BLD AUTO: 85.8 % (ref 42.7–76)
NRBC BLD AUTO-RTO: 0 /100 WBC (ref 0–0.2)
PHOSPHATE SERPL-MCNC: 3 MG/DL (ref 2.5–4.5)
PLATELET # BLD AUTO: 114 10*3/MM3 (ref 140–450)
PMV BLD AUTO: 12.3 FL (ref 6–12)
POTASSIUM SERPL-SCNC: 4.4 MMOL/L (ref 3.5–5.2)
PROT SERPL-MCNC: 6.4 G/DL (ref 6–8.5)
QT INTERVAL: 326 MS
QT INTERVAL: 338 MS
QTC INTERVAL: 413 MS
QTC INTERVAL: 428 MS
RBC # BLD AUTO: 4.16 10*6/MM3 (ref 4.14–5.8)
SODIUM SERPL-SCNC: 131 MMOL/L (ref 136–145)
STRESS TARGET HR: 119 BPM
TRIGL SERPL-MCNC: 94 MG/DL (ref 0–150)
TROPONIN T SERPL-MCNC: 0.05 NG/ML (ref 0–0.03)
VLDLC SERPL-MCNC: 19 MG/DL (ref 5–40)
WBC NRBC COR # BLD: 14.69 10*3/MM3 (ref 3.4–10.8)

## 2023-01-06 PROCEDURE — 84100 ASSAY OF PHOSPHORUS: CPT | Performed by: INTERNAL MEDICINE

## 2023-01-06 PROCEDURE — 84484 ASSAY OF TROPONIN QUANT: CPT | Performed by: INTERNAL MEDICINE

## 2023-01-06 PROCEDURE — 25010000002 CEFEPIME PER 500 MG: Performed by: INTERNAL MEDICINE

## 2023-01-06 PROCEDURE — 80053 COMPREHEN METABOLIC PANEL: CPT | Performed by: INTERNAL MEDICINE

## 2023-01-06 PROCEDURE — 99221 1ST HOSP IP/OBS SF/LOW 40: CPT | Performed by: NURSE PRACTITIONER

## 2023-01-06 PROCEDURE — 99222 1ST HOSP IP/OBS MODERATE 55: CPT | Performed by: INTERNAL MEDICINE

## 2023-01-06 PROCEDURE — 83735 ASSAY OF MAGNESIUM: CPT | Performed by: INTERNAL MEDICINE

## 2023-01-06 PROCEDURE — 93005 ELECTROCARDIOGRAM TRACING: CPT | Performed by: FAMILY MEDICINE

## 2023-01-06 PROCEDURE — 85025 COMPLETE CBC W/AUTO DIFF WBC: CPT | Performed by: INTERNAL MEDICINE

## 2023-01-06 PROCEDURE — 83036 HEMOGLOBIN GLYCOSYLATED A1C: CPT | Performed by: INTERNAL MEDICINE

## 2023-01-06 PROCEDURE — 80061 LIPID PANEL: CPT | Performed by: INTERNAL MEDICINE

## 2023-01-06 PROCEDURE — 93306 TTE W/DOPPLER COMPLETE: CPT

## 2023-01-06 PROCEDURE — 25010000002 VANCOMYCIN 10 G RECONSTITUTED SOLUTION: Performed by: INTERNAL MEDICINE

## 2023-01-06 PROCEDURE — 97165 OT EVAL LOW COMPLEX 30 MIN: CPT | Performed by: OCCUPATIONAL THERAPIST

## 2023-01-06 PROCEDURE — 93010 ELECTROCARDIOGRAM REPORT: CPT | Performed by: EMERGENCY MEDICINE

## 2023-01-06 PROCEDURE — 93005 ELECTROCARDIOGRAM TRACING: CPT | Performed by: INTERNAL MEDICINE

## 2023-01-06 PROCEDURE — 25010000002 ENOXAPARIN PER 10 MG: Performed by: INTERNAL MEDICINE

## 2023-01-06 PROCEDURE — 97161 PT EVAL LOW COMPLEX 20 MIN: CPT

## 2023-01-06 PROCEDURE — 93306 TTE W/DOPPLER COMPLETE: CPT | Performed by: INTERNAL MEDICINE

## 2023-01-06 RX ORDER — DILTIAZEM HYDROCHLORIDE 5 MG/ML
5 INJECTION INTRAVENOUS ONCE
Status: COMPLETED | OUTPATIENT
Start: 2023-01-07 | End: 2023-01-06

## 2023-01-06 RX ADMIN — ACETAMINOPHEN 650 MG: 325 TABLET, FILM COATED ORAL at 22:05

## 2023-01-06 RX ADMIN — Medication 10 ML: at 09:26

## 2023-01-06 RX ADMIN — ENOXAPARIN SODIUM 30 MG: 30 INJECTION SUBCUTANEOUS at 09:26

## 2023-01-06 RX ADMIN — CEFEPIME 2 G: 2 INJECTION, POWDER, FOR SOLUTION INTRAVENOUS at 17:41

## 2023-01-06 RX ADMIN — SODIUM CHLORIDE, POTASSIUM CHLORIDE, SODIUM LACTATE AND CALCIUM CHLORIDE 50 ML/HR: 600; 310; 30; 20 INJECTION, SOLUTION INTRAVENOUS at 22:05

## 2023-01-06 RX ADMIN — VANCOMYCIN HYDROCHLORIDE 750 MG: 10 INJECTION, POWDER, LYOPHILIZED, FOR SOLUTION INTRAVENOUS at 20:56

## 2023-01-06 RX ADMIN — DILTIAZEM HYDROCHLORIDE 5 MG: 5 INJECTION INTRAVENOUS at 23:40

## 2023-01-06 RX ADMIN — Medication 10 ML: at 20:56

## 2023-01-06 RX ADMIN — Medication 6 MG: at 22:05

## 2023-01-06 NOTE — PLAN OF CARE
Goal Outcome Evaluation:  Plan of Care Reviewed With: patient           Outcome Evaluation: PT IE complete.  A&Ox4.  No c/o pain.  Pt independent PLOF.  Today he ambulated 220ft SBA x1 w/ RW on 1L NC.  Wears O2 at home prn.  PT to see for progressive ambulation w/o RW and stair training.  Recommend return home at [place].  Thank you for referral.

## 2023-01-06 NOTE — CONSULTS
Referring Provider: Dr. Lloyd  Reason for Consultation: Loculated Pleural effusion    Patient Care Team:  Irving Alexis MD as PCP - General (General Practice)  Gareth Becerra MD as Consulting Physician (Pulmonary Disease)  Randy Somers DO as Consulting Physician (Gastroenterology)  Jos Sylvester MD as Consulting Physician (Urology)  Jeff Michele MD as Consulting Physician (Oncology)    Chief complaint Weakness, Fall    Subjective .     History of present illness: Mr. 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  He previously followed with Dr. Michele however it does not appear he has seen him since 7/26/2020.  He follows with Dr. Patel as an outpatient although it appears he has canceled most recent office visits.  He does have stage III chronic kidney disease.  He presented to [facility] ER yesterday after a fall when going to the restroom.  He was unable to get up on his own however family did stop by and assisted him to get up.  He reported ongoing weakness and fatigue as well as chills over the past 2 days.  Work-up ensued in the ER and he was ultimately admitted to the hospital service for ongoing treatment.  CT scan of the abdomen and pelvis was obtained today revealing right-sided pleural effusion that appears loculated with associated pleural thickening.  Cardiothoracic surgery has been consulted for management of this pleural effusion.  Patient is currently resting on 1 L nasal cannula.  He is on chronic supplemental O2 at home at 2 L.  He does have known pulmonary fibrosis.  Creatinine today is 2.37, albumin is 2.8, WBC 14.  Blood cultures have been drawn but are pending.  He has been started on cefepime and vancomycin.    History  Code Status and Medical Interventions:   Ordered at: 01/05/23 2200     Level Of Support Discussed  With:    Patient     Code Status (Patient has no pulse and is not breathing):    CPR (Attempt to Resuscitate)     Medical Interventions (Patient has pulse or is breathing):    Full Support         Past Medical History:   Diagnosis Date   • Arthritis    • Atrial fibrillation with rapid ventricular response (HCC) 5/31/2019   • Blindness of left eye    • BPH with obstruction/lower urinary tract symptoms    • Cancer (HCC)     stomach & lung   • CHF (congestive heart failure) (HCC)    • CKD (chronic kidney disease) stage 3, GFR 30-59 ml/min (HCC)    • Coronary artery disease    • Elevated cholesterol    • Essential hypertension 10/2/2017   • Fibrotic lung diseases (HCC)    • GERD (gastroesophageal reflux disease)    • Hearing loss    • History of transfusion    • Hydronephrosis of left kidney    • Hyperlipidemia    • Hypertension     pt was taken off of all of his medications for BP (atenolol, lisinopril, lasix) because his BP kept bottoming out so his primary dr told him to discontinue them 1-2 months ago (Jan/Feb 2019). pt states he takes no medications currently.   • Mass of duodenum versus letty hepatis  4/27/2019   • Mass of left renal hilum  4/27/2019   • Mass of upper lobe of right lung 02/2019    mass is shrinking on its own, so pt states Dr. Patel is just going to keep an eye on it and not do surgery right now.   • Mediastinal adenopathy 10/24/2018    [facility]   • Monoclonal gammopathy of unknown significance (MGUS) 9/11/2018   • Pancreatic mass     pt states he had this in 2013 but it went away on its own. Now recent CT shows it has come back so he is going to have an ultrasound on 3/13/19.   • Shortness of breath    ,   Past Surgical History:   Procedure Laterality Date   • ABDOMINAL SURGERY     • BRONCHOSCOPY N/A 10/24/2018    Procedure: BRONCHOSCOPY WITH BIOPSY, EBUS;  Surgeon: Gareth Becerra MD;  Location: [facility] OR;  Service: Pulmonary   • CHOLECYSTECTOMY     • COLONOSCOPY N/A 1/3/2019    Procedure:  COLONOSCOPY WITH ANESTHESIA;  Surgeon: Randy Somers DO;  Location: [facility] ENDOSCOPY;  Service: Gastroenterology   • CYSTOSCOPY, RETROGRADE PYELOGRAM AND STENT INSERTION Left 3/8/2019    Procedure: CYSTOSCOPY RETROGRADE BILATERAL PYELOGRAM;  Surgeon: Jos Sylvester MD;  Location: [facility] OR;  Service: Urology   • ENDOSCOPY N/A 2018    Procedure: ESOPHAGOGASTRODUODENOSCOPY WITH ANESTHESIA;  Surgeon: Randy Somers DO;  Location: [facility] ENDOSCOPY;  Service: Gastroenterology   • ENDOSCOPY N/A 2019    Procedure: ESOPHAGOGASTRODUODENOSCOPY WITH ANESTHESIA;  Surgeon: Lilliam Jj MD;  Location: [facility] OR;  Service: Gastroenterology   • ENDOSCOPY N/A 2019    Procedure: ESOPHAGOGASTRODUODENOSCOPY WITH ANESTHESIA;  Surgeon: Pilo Bansal MD;  Location: [facility] ENDOSCOPY;  Service: Gastroenterology   • EYE SURGERY Left    • FOOT SURGERY Right     joint   • FRACTURE SURGERY     ,   Family History   Problem Relation Age of Onset   • Hypertension Mother    • Leukemia Father    ,   Social History     Tobacco Use   • Smoking status: Former     Years: 49.00     Types: Cigarettes     Quit date: 10/29/2003     Years since quittin.2   • Smokeless tobacco: Former     Types: Chew     Quit date:    Substance Use Topics   • Alcohol use: No   • Drug use: No   ,   Medications Prior to Admission   Medication Sig Dispense Refill Last Dose   • acetaminophen (TYLENOL) 500 MG tablet Take 1,000 mg by mouth Every Night.      • furosemide (LASIX) 20 MG tablet Take 20 mg by mouth 2 (Two) Times a Day.      • losartan (COZAAR) 50 MG tablet Take 50 mg by mouth Daily.      • melatonin 5 MG tablet tablet Take 10 mg by mouth Every Night.      • metoprolol succinate XL (TOPROL-XL) 25 MG 24 hr tablet Take 12.5 mg by mouth Daily.      • multivitamin with minerals tablet tablet Take 1 tablet by mouth Daily.      • pantoprazole (PROTONIX) 40 MG EC tablet Take 1 tablet by mouth Daily. 30 tablet 1    • psyllium  "(METAMUCIL) 58.6 % packet Take 1 packet by mouth Daily.      • tamsulosin (FLOMAX) 0.4 MG capsule 24 hr capsule Take 1 capsule by mouth Every Night.      , Allergies: Penicillins    Review of Systems  Review of Systems   Constitutional: Positive for appetite change, chills and fatigue. Negative for fever.   HENT: Negative for trouble swallowing and voice change.    Eyes: Negative for visual disturbance.   Respiratory: Negative for chest tightness and shortness of breath.    Cardiovascular: Negative for chest pain, palpitations and leg swelling.   Gastrointestinal: Positive for nausea. Negative for abdominal pain, diarrhea and vomiting.   Genitourinary: Negative for difficulty urinating, dysuria and hematuria.   Musculoskeletal: Negative for arthralgias and myalgias.   Skin: Negative for color change, pallor, rash and wound.   Neurological: Positive for weakness. Negative for dizziness, syncope and light-headedness.   Hematological: Does not bruise/bleed easily.   Psychiatric/Behavioral: Negative for agitation, confusion and sleep disturbance.        Objective     Vital Signs   Visit Vitals  /59 (BP Location: Left arm, Patient Position: Lying)   Pulse 83   Temp 98.3 °F (36.8 °C) (Oral)   Resp 16   Ht 162.6 cm (64\")   Wt 79 kg (174 lb 3.2 oz)   SpO2 90%   BMI 29.90 kg/m²       Physical Exam  Vitals reviewed.   Constitutional:       General: He is not in acute distress.     Appearance: He is ill-appearing (Chronically ill-appearing).   HENT:      Head: Normocephalic.   Eyes:      Comments: Abnormal left eye   Cardiovascular:      Rate and Rhythm: Normal rate and regular rhythm.      Heart sounds: Normal heart sounds. No murmur heard.  Pulmonary:      Breath sounds: Normal breath sounds. No wheezing or rales.   Abdominal:      General: There is no distension.      Palpations: Abdomen is soft.      Tenderness: There is no abdominal tenderness.   Musculoskeletal:         General: No swelling or tenderness.   Skin:   "   General: Skin is warm and dry.   Neurological:      General: No focal deficit present.      Mental Status: He is alert and oriented to person, place, and time.   Psychiatric:         Mood and Affect: Mood normal.         Behavior: Behavior normal.         Thought Content: Thought content normal.         Judgment: Judgment normal.           LAB:   CBC:  Results from last 7 days   Lab Units 01/06/23  0620 01/05/23  1430   WBC 10*3/mm3 14.69* 19.99*   HEMATOCRIT % 39.0 41.1   PLATELETS 10*3/mm3 114* 128*          BMP:)  Results from last 7 days   Lab Units 01/06/23  0620 01/05/23  1505 01/05/23  1430   SODIUM mmol/L 131*  --  135*   SODIUM, ARTERIAL mmol/L  --  135*  --    POTASSIUM mmol/L 4.4  --  4.3   CHLORIDE mmol/L 103  --  102   CO2 mmol/L 21.0*  --  22.0   GLUCOSE mg/dL 89  --  184*   BUN mg/dL 49*  --  50*   CREATININE mg/dL 2.37*  --  2.63*           COAG:  Results from last 7 days   Lab Units 01/05/23  1430   INR  1.29*   APTT seconds 33.5           IMAGES:       Imaging Results (Last 24 Hours)     Procedure Component Value Units Date/Time    CT Abdomen Pelvis Without Contrast [[id number]] Collected: 01/05/23 1828     Updated: 01/05/23 1842    Narrative:      CT ABDOMEN PELVIS WO CONTRAST- 1/5/2023 6:07 PM CST     HISTORY: abdominal pain      COMPARISON: Left 16 2022      DLP: 341 mGy cm. All CT scans are performed using dose optimization  techniques as appropriate to the performed exam and including at least  one of the following: Automated exposure control, adjustment of the mA  and/or kV according to size, and the use of the iterative reconstruction  technique.     TECHNIQUE: Noncontrast enhanced images of the abdomen and pelvis  obtained without oral contrast.      FINDINGS:   Bilateral gynecomastia is present. As noted on the previous examination  there is pleural thickening within the right lateral and posterior lung  base with what appears to be a loculated effusion. The effusion shows  slight  interval increase in size from the previous exam. There is  associated pleural nodularity. A portion of this effusion is subpulmonic  in location. Given the associated nodularity pleural-based metastasis  has to be considered in the differential within the right lung base.  There is interstitial fibrosis within both lungs particularly noted  within the lower lobes. There is bronchial wall thickening within both  lower lobes. Aneurysmal dilatation of the ascending thoracic aorta with  a transverse diameter of 4 cm. Heavy coronary calcifications are  present. There is mild cardiomegaly. No evidence of pericardial  effusion..      LIVER: Pneumobilia noted at the time of the previous exam has shown  diminishment. There is a small amount of residual air within the common  hepatic duct. There is minimal air within the left biliary tree. No  evidence of discrete hepatic mass..      BILIARY SYSTEM: The gallbladder is surgically absent. There is no  biliary dilatation present..      PANCREAS: No focal pancreatic lesion.      SPLEEN: A splenule is present. No evidence of splenomegaly..      KIDNEYS AND ADRENALS: The adrenals are unremarkable. There is  nonspecific bilateral perinephric stranding present. There are cortical  cysts of both kidneys. A lesion involving the lateral lower pole the  left kidney is more indeterminate in nature measuring Hounsfield units  of up to 45. This could represent a hemorrhagic cyst. A lesion involving  the posterior midpole of the left kidney also demonstrates rather high  Hounsfield units. These are stable from the previous examination. There  is a nonobstructing calculus involving the lower pole calyx of the left  kidney measuring 6 mm in size. No evidence of right-sided  nephrolithiasis..  The ureters are decompressed and normal in  appearance.     RETROPERITONEUM: No mass, lymphadenopathy or hemorrhage.      GI TRACT: There is mild constipation. No obstruction or free air.. The  appendix  is visualized and unremarkable. There is a loop of small bowel  which has been brought up into the right upper quadrant suggesting  previous biliary diversion.     OTHER: There is no mesenteric mass, lymphadenopathy or fluid collection.  The osseous structures and soft tissues demonstrate no worrisome  lesions. There is listhesis at both the L4-L5 and L5-S1 level. There is  bilateral spondylolysis at L5. The listhesis at L4-L5 is likely related  to subluxation at the level of the facets.      PELVIS: Bilateral fat-containing inguinal hernias are present. The  prostate gland is mildly enlarged. There is no free fluid in the pelvis.  There is calcific tendinosis of both common hamstring insertions at  their insertion on the ischial tuberosity.. The urinary bladder is  normal in appearance.       Impression:      1. Mild constipation. Otherwise normal bowel gas pattern.  2. Right-sided pleural effusion which appears loculated with associated  pleural thickening. The pleural thickening is somewhat nodular in  appearance and if the patient has a prior history of lung neoplasm could  potentially represent pleural-based studding. This shows some  progression from the previous examination. The effusion shows slight  interval increase in size. There is interstitial fibrosis within both  lung bases.  3. Mild aneurysmal dilatation of the ascending thoracic aorta. Heavy  coronary calcifications are present. There is no pericardial effusion.  4. Cortical cysts of both kidneys. There are again 2 lesions within the  left kidney which are more indeterminate but which are stable from the  previous exam. There is nonobstructing left-sided nephrolithiasis. No  evidence of ureteral stone or obstructive uropathy. No free fluid or  localized inflammatory process is demonstrated.  5. Small bilateral fat-containing inguinal hernias. The prostate gland  is enlarged.  6. Listhesis at the L4-L5 and L5-S1 level..         This report was  finalized on 01/05/2023 18:39 by Dr. Naveed Reynolds MD.    XR Chest 1 View [[id number]] Collected: 01/05/23 1417     Updated: 01/05/23 1422    Narrative:      EXAMINATION: XR CHEST 1 VW- 1/5/2023 2:17 PM CST     HISTORY: cough with congestion     REPORT: A frontal view of the chest was obtained.     COMPARISON: Chest x-ray 08/26/2021.     The lungs are hypoaerated, there aren't coarse reticular interstitial  markings diffusely as before most compatible with advanced pulmonary  fibrosis. There is chronic pleural thickening in the right lateral lung  base. No superimposed lung consolidation is identified. Heart size is  normal. The right internal jugular port catheter appears in good  position as before. No acute osseous abnormality. The upper abdomen  appears unremarkable.       Impression:      Extensive chronic pulmonary fibrosis which appears stable,  no definite superimposed acute infiltrate is identified. Chronic pleural  thickening and/or small right pleural effusion. Stable satisfactory  position of the right-sided port catheter.     This report was finalized on 01/05/2023 14:19 by Dr. Allen Churchill MD.                       Assessment & Plan    Lung adenocarcinoma  Right sided pleural effusion  Leukocytosis  Stage III chronic kidney disease  Former smoker    Imaging reviewed by myself and Dr. Samuel and compared to previous imaging in November 2022.  No significant change is appreciated.  Given stability in respiratory status, will defer pleural fluid drainage at this time.  Patient is agreeable to this.    We will follow peripherally  Discussed with patient and nursing      MARITZA Contreras  01/06/23  11:38 CST

## 2023-01-06 NOTE — PLAN OF CARE
Goal Outcome Evaluation:  Plan of Care Reviewed With: patient        Progress: no change  Outcome Evaluation: OT eval completed.  Pt. is AxO x4 & extremely pleasant.  He reports living alone and independent with ADLs, driving, small meal prep.  He has family member who cleans for him & he drives to [employer] 3x/week for lunch.  Mr Prater's O2 was about 88 on .5L so OTR increased supplemental O2 to 1L and his readings improved to WNL.  Mr. Prater was able to come to EOB and DOFF his regular socks but required Max A to SALLY 2' SOA.  Mr. Prater then ambulated to chair with rwx.  The pt has had fxl decline and would benefit from cont'd OT tx to improve his indep in self care and fxl mob.  I rec he have assist for IADLs & frequent checks from family & HH.

## 2023-01-06 NOTE — THERAPY EVALUATION
Patient Name: Karoline Prater  : 1940    MRN: [id number]                              Today's Date: 2023       Admit Date: 2023    Visit Dx:     ICD-10-CM ICD-9-CM   1. NSTEMI (non-ST elevated myocardial infarction) (HCC)  I21.4 410.70   2. Chronic kidney disease, unspecified CKD stage  N18.9 585.9   3. Adenocarcinoma (HCC)  C80.1 199.1   4. Abdominal pain, unspecified abdominal location  R10.9 789.00   5. Impaired mobility  Z74.09 799.89     Patient Active Problem List   Diagnosis   • Dyspnea   • Essential hypertension   • NSVT (nonsustained ventricular tachycardia)   • Malignant neoplasm of upper lobe of right lung (HCC)   • Stage 3 chronic kidney disease (HCC)   • Adenocarcinoma, lung (HCC)   • Pancytopenia due to antineoplastic chemotherapy (HCC)   • Pneumonia due to infectious organism   • Function kidney decreased   • Cellulitis   • Acute respiratory failure with hypoxia (HCC)   • Fibrotic lung diseases (HCC)   • Macrocytic anemia   • Thrombocytopenia (HCC)   • Sepsis (HCC)   • Right pneumothorax   • Hyperkalemia   • Acute renal failure superimposed on chronic kidney disease (HCC)   • NSTEMI (non-ST elevated myocardial infarction) (HCC)     Past Medical History:   Diagnosis Date   • Arthritis    • Atrial fibrillation with rapid ventricular response (HCC) 2019   • Blindness of left eye    • BPH with obstruction/lower urinary tract symptoms    • Cancer (HCC)     stomach & lung   • CHF (congestive heart failure) (HCC)    • CKD (chronic kidney disease) stage 3, GFR 30-59 ml/min (HCC)    • Coronary artery disease    • Elevated cholesterol    • Essential hypertension 10/2/2017   • Fibrotic lung diseases (HCC)    • GERD (gastroesophageal reflux disease)    • Hearing loss    • History of transfusion    • Hydronephrosis of left kidney    • Hyperlipidemia    • Hypertension     pt was taken off of all of his medications for BP (atenolol, lisinopril, lasix) because his BP kept bottoming out so his  primary dr told him to discontinue them 1-2 months ago (Jan/Feb 2019). pt states he takes no medications currently.   • Mass of duodenum versus letty hepatis  4/27/2019   • Mass of left renal hilum  4/27/2019   • Mass of upper lobe of right lung 02/2019    mass is shrinking on its own, so pt states Dr. Patel is just going to keep an eye on it and not do surgery right now.   • Mediastinal adenopathy 10/24/2018    [facility]   • Monoclonal gammopathy of unknown significance (MGUS) 9/11/2018   • Pancreatic mass     pt states he had this in 2013 but it went away on its own. Now recent CT shows it has come back so he is going to have an ultrasound on 3/13/19.   • Shortness of breath      Past Surgical History:   Procedure Laterality Date   • ABDOMINAL SURGERY     • BRONCHOSCOPY N/A 10/24/2018    Procedure: BRONCHOSCOPY WITH BIOPSY, EBUS;  Surgeon: Gareth Becerra MD;  Location: [facility] OR;  Service: Pulmonary   • CHOLECYSTECTOMY     • COLONOSCOPY N/A 1/3/2019    Procedure: COLONOSCOPY WITH ANESTHESIA;  Surgeon: Randy Somers DO;  Location: [facility] ENDOSCOPY;  Service: Gastroenterology   • CYSTOSCOPY, RETROGRADE PYELOGRAM AND STENT INSERTION Left 3/8/2019    Procedure: CYSTOSCOPY RETROGRADE BILATERAL PYELOGRAM;  Surgeon: Jos Sylvester MD;  Location: [facility] OR;  Service: Urology   • ENDOSCOPY N/A 12/11/2018    Procedure: ESOPHAGOGASTRODUODENOSCOPY WITH ANESTHESIA;  Surgeon: Randy Somers DO;  Location: [facility] ENDOSCOPY;  Service: Gastroenterology   • ENDOSCOPY N/A 4/29/2019    Procedure: ESOPHAGOGASTRODUODENOSCOPY WITH ANESTHESIA;  Surgeon: Lilliam Jj MD;  Location: [facility] OR;  Service: Gastroenterology   • ENDOSCOPY N/A 5/9/2019    Procedure: ESOPHAGOGASTRODUODENOSCOPY WITH ANESTHESIA;  Surgeon: Pilo Bansal MD;  Location: [facility] ENDOSCOPY;  Service: Gastroenterology   • EYE SURGERY Left 1964   • FOOT SURGERY Right 1966    joint   • FRACTURE SURGERY        General Information     Row Name  01/06/23 1040          OT Time and Intention    Document Type evaluation  -     Mode of Treatment occupational therapy  -     Row Name 01/06/23 1040          General Information    Patient Profile Reviewed yes  -     Prior Level of Function independent:;all household mobility;community mobility;ADL's;driving;max assist:;cleaning  Pt goes to [employer] for lunch 3x/week  -     Existing Precautions/Restrictions fall;oxygen therapy device and L/min  -     Barriers to Rehab medically complex  -     Row Name 01/06/23 1040          Occupational Profile    Reason for Services/Referral (Occupational Profile) NSTEMI, Elevated troponin, SIRS, Generalized weakness  -     Row Name 01/06/23 1040          Living Environment    People in Home alone  -     Row Name 01/06/23 1040          Home Main Entrance    Number of Stairs, Main Entrance two  -     Row Name 01/06/23 1040          Stairs Within Home, Primary    Number of Stairs, Within Home, Primary none  -     Stair Railings, Within Home, Primary none  -     Row Name 01/06/23 1040          Cognition    Orientation Status (Cognition) oriented x 4  -     Row Name 01/06/23 1040          Safety Issues, Functional Mobility    Safety Issues Affecting Function (Mobility) friction/shear risk  -     Impairments Affecting Function (Mobility) balance;endurance/activity tolerance;shortness of breath;postural/trunk control;pain  -           User Key  (r) = Recorded By, (t) = Taken By, (c) = Cosigned By    Initials Name Provider Type     Clarissa Valverde, OTR/L Occupational Therapist                 Mobility/ADL's     Row Name 01/06/23 1040          Bed Mobility    Bed Mobility supine-sit  -     Supine-Sit [facility] (Bed Mobility) contact guard  -     Assistive Device (Bed Mobility) head of bed elevated;bed rails  -     Row Name 01/06/23 1040          Transfers    Transfers sit-stand transfer;stand-sit transfer  -     Row Name 01/06/23 1040           Sit-Stand Transfer    Sit-Stand [place] (Transfers) contact guard  -     Assistive Device (Sit-Stand Transfers) walker, front-wheeled  -     Row Name 01/06/23 1040          Stand-Sit Transfer    Stand-Sit [place] (Transfers) contact guard  -     Assistive Device (Stand-Sit Transfers) walker, front-wheeled  -     Row Name 01/06/23 1040          Functional Mobility    Functional Mobility- Ind. Level contact guard assist  -     Functional Mobility- Device walker, front-wheeled  -     Functional Mobility- Safety Issues supplemental O2  -     Functional Mobility- Comment Pt. requires 1L of O2, ambulated to chair in room  -     Row Name 01/06/23 1040          Activities of Daily Living    BADL Assessment/Intervention lower body dressing  -[facility] Name 01/06/23 1040          Lower Body Dressing Assessment/Training    [place] Level (Lower Body Dressing) contact guard assist;doff;maximum assist (25% patient effort);don;socks  -     Position (Lower Body Dressing) edge of bed sitting  -     Comment, (Lower Body Dressing) pt became SOA  -           User Key  (r) = Recorded By, (t) = Taken By, (c) = Cosigned By    Initials Name Provider Type     Clarissa Valverde, OTR/L Occupational Therapist               Obj/Interventions     Row Name 01/06/23 1040          Sensory Assessment (Somatosensory)    Sensory Assessment (Somatosensory) UE sensation intact  -[facility] Name 01/06/23 1040          Vision Assessment/Intervention    Vision Assessment Comment L eye visual loss  -[facility] Name 01/06/23 1040          Range of Motion Comprehensive    General Range of Motion bilateral upper extremity ROM WNL  -[facility] Name 01/06/23 1040          Strength Comprehensive (MMT)    General Manual Muscle Testing (MMT) Assessment no strength deficits identified  -[facility] Name 01/06/23 1040          Balance    Balance Assessment sitting static balance;sitting dynamic balance;standing static  balance;standing dynamic balance;sit to stand dynamic balance  -     Static Sitting Balance supervision  -     Dynamic Sitting Balance contact guard  -CH     Position, Sitting Balance unsupported;sitting edge of bed  -     Sit to Stand Dynamic Balance contact guard  -CH     Static Standing Balance contact guard  -CH     Dynamic Standing Balance contact guard  -CH     Position/Device Used, Standing Balance walker, front-wheeled  -     Balance Interventions sitting;standing;sit to stand;supported;static;dynamic  -CH           User Key  (r) = Recorded By, (t) = Taken By, (c) = Cosigned By    Initials Name Provider Type     Clarissa Valverde, OTR/L Occupational Therapist               Goals/Plan     Row Name 01/06/23 1040          Bathing Goal 1 (OT)    Activity/Device (Bathing Goal 1, OT) bathing skills, all  -CH     [place] Level/Cues Needed (Bathing Goal 1, OT) contact guard required  -CH     Time Frame (Bathing Goal 1, OT) long term goal (LTG);by discharge  -     Progress/Outcomes (Bathing Goal 1, OT) new [facility]  -     Row Name 01/06/23 1040          Dressing Goal 1 (OT)    Activity/Device (Dressing Goal 1, OT) dressing skills, all  -CH     [place]/Cues Needed (Dressing Goal 1, OT) set-up required  -CH     Time Frame (Dressing Goal 1, OT) long term goal (LTG);by discharge  -     Progress/Outcome (Dressing Goal 1, OT) new [facility]     Row Name 01/06/23 1040          Toileting Goal 1 (OT)    Activity/Device (Toileting Goal 1, OT) toileting skills, all;commode  -CH     [place] Level/Cues Needed (Toileting Goal 1, OT) supervision required  -CH     Time Frame (Toileting Goal 1, OT) long term goal (LTG);by discharge  -     Progress/Outcome (Toileting Goal 1, OT) new [facility]     Row Name 01/06/23 1040          Therapy Assessment/Plan (OT)    Planned Therapy Interventions (OT) activity tolerance training;adaptive equipment training;BADL retraining;functional balance  retraining;patient/caregiver education/training;occupation/activity based interventions;strengthening exercise;transfer/mobility retraining  -           User Key  (r) = Recorded By, (t) = Taken By, (c) = Cosigned By    Initials Name Provider Type     Clarissa Valverde, OTR/L Occupational Therapist               Clinical Impression     Row Name 01/06/23 1040          Pain Assessment    Additional Documentation Pain Scale: FACES Pre/Post-Treatment (Group)  -     Row Name 01/06/23 1040          Pain Scale: FACES Pre/Post-Treatment    Pain: FACES Scale, Pretreatment 0-->no hurt  -     Posttreatment Pain Rating 4-->hurts little more  -     Pain Location - Side/Orientation Right  -     Pain Location - flank  -     Row Name 01/06/23 1040          Plan of Care Review    Plan of Care Reviewed With patient  -     Progress no change  -     Outcome Evaluation OT eval completed.  Pt. is AxO x4 & extremely pleasant.  He reports living alone and independent with ADLs, driving, small meal prep.  He has family member who cleans for him & he drives to [employer] 3x/week for lunch.  Mr Prater's O2 was about 88 on .5L so OTR increased supplemental O2 to 1L and his readings improved to WNL.  Mr. Prater was able to come to EOB and DOFF his regular socks but required Max A to SALLY 2' SOA.  Mr. Prater then ambulated to chair with rwx.  The pt has had fxl decline and would benefit from cont'd OT tx to improve his indep in self care and fxl mob.  I rec he have assist for IADLs & frequent checks from family & .  -     Row Name 01/06/23 1040          Therapy Assessment/Plan (OT)    Patient/Family Therapy Goal Statement (OT) return home  -     Rehab Potential (OT) good, to achieve stated therapy goals  -     Criteria for Skilled Therapeutic Interventions Met (OT) yes;skilled treatment is necessary  -     Therapy Frequency (OT) 5 times/wk  -     Predicted Duration of Therapy Intervention (OT) 10 days  -     Row Name  01/06/23 1040          Therapy Plan Review/Discharge Plan (OT)    Anticipated Discharge Disposition (OT) home with assist;home with home health  -     Row Name 01/06/23 1040          Positioning and Restraints    Pre-Treatment Position in bed  -     Post Treatment Position chair  -     In Chair sitting;call light within reach;encouraged to call for assist;notified nsg  -           User Key  (r) = Recorded By, (t) = Taken By, (c) = Cosigned By    Initials Name Provider Type     Clarissa Valverde, OTR/L Occupational Therapist               Outcome Measures     Row Name 01/06/23 1040          How much help from another is currently needed...    Putting on and taking off regular lower body clothing? 2  -CH     Bathing (including washing, rinsing, and drying) 3  -CH     Toileting (which includes using toilet bed pan or urinal) 3  -CH     Putting on and taking off regular upper body clothing 3  -CH     Taking care of personal grooming (such as brushing teeth) 4  -CH     Eating meals 4  -     AM-PAC 6 Clicks Score (OT) 19  -     Row Name 01/06/23 1122          How much help from another person do you currently need...    Turning from your back to your side while in flat bed without using bedrails? 4  -LH     Moving from lying on back to sitting on the side of a flat bed without bedrails? 4  -LH     Moving to and from a bed to a chair (including a wheelchair)? 3  -LH     Standing up from a chair using your arms (e.g., wheelchair, bedside chair)? 4  -LH     Climbing 3-5 steps with a railing? 3  -LH     To walk in hospital room? 3  -     AM-PAC 6 Clicks Score (PT) 21  -     Highest level of mobility 6 --> Walked 10 steps or more  -     Row Name 01/06/23 1122 01/06/23 1040       Functional Assessment    Outcome Measure Options AM-PAC 6 Clicks Basic Mobility (PT)  - AM-PAC 6 Clicks Daily Activity (OT)  -          User Key  (r) = Recorded By, (t) = Taken By, (c) = Cosigned By    Initials Name Provider Type     LH Martín Lu, PT Physical Therapist     Clarissa Valverde, OTR/L Occupational Therapist                Occupational Therapy Education     Title: PT OT SLP Therapies (In Progress)     Topic: Occupational Therapy (In Progress)     Point: ADL training (Done)     Description:   Instruct learner(s) on proper safety adaptation and remediation techniques during self care or transfers.   Instruct in proper use of assistive devices.              Learning Progress Summary           Patient Acceptance, E,D, VU,NR by  at 1/6/2023 1253                   Point: Home exercise program (Not Started)     Description:   Instruct learner(s) on appropriate technique for monitoring, assisting and/or progressing therapeutic exercises/activities.              Learner Progress:  Not documented in this visit.          Point: Precautions (Not Started)     Description:   Instruct learner(s) on prescribed precautions during self-care and functional transfers.              Learner Progress:  Not documented in this visit.          Point: Body mechanics (Not Started)     Description:   Instruct learner(s) on proper positioning and spine alignment during self-care, functional mobility activities and/or exercises.              Learner Progress:  Not documented in this visit.                      User Key     Initials Effective Dates Name Provider Type Discipline     06/16/21 -  Clarissa Valverde, OTR/L Occupational Therapist OT              OT Recommendation and Plan  Planned Therapy Interventions (OT): activity tolerance training, adaptive equipment training, BADL retraining, functional balance retraining, patient/caregiver education/training, occupation/activity based interventions, strengthening exercise, transfer/mobility retraining  Therapy Frequency (OT): 5 times/wk  Plan of Care Review  Plan of Care Reviewed With: patient  Progress: no change  Outcome Evaluation: OT eval completed.  Pt. is AxO x4 & extremely pleasant.  He reports  living alone and independent with ADLs, driving, small meal prep.  He has family member who cleans for him & he drives to [employer] 3x/week for lunch.  Mr rPater's O2 was about 88 on .5L so OTR increased supplemental O2 to 1L and his readings improved to WNL.  Mr. Prater was able to come to EOB and DOFF his regular socks but required Max A to SALLY 2' SOA.  Mr. Prater then ambulated to chair with rwx.  The pt has had fxl decline and would benefit from cont'd OT tx to improve his indep in self care and fxl mob.  I rec he have assist for IADLs & frequent checks from family & .     Time Calculation:    Time Calculation- OT     Row Name 01/06/23 1040             Time Calculation- OT    OT Start Time 1040  add 4 for CR  -      OT Stop Time 1130  -      OT Time Calculation (min) 50 min  -      OT Received On 01/06/23  -      OT Goal Re-Cert Due Date 01/16/23  -            User Key  (r) = Recorded By, (t) = Taken By, (c) = Cosigned By    Initials Name Provider Type     Clarissa Valverde OTR/L Occupational Therapist              Therapy Charges for Today     Code Description Service Date Service Provider Modifiers Qty    [id number] HC OT EVAL LOW COMPLEXITY 4 1/6/2023 Clarissa Valverde OTR/L GO 1               SUNITHA Rose/ORACIO  1/6/2023

## 2023-01-06 NOTE — THERAPY EVALUATION
Patient Name: Karoline Prater  : 1940    MRN: [id number]                              Today's Date: 2023       Admit Date: 2023    Visit Dx:     ICD-10-CM ICD-9-CM   1. NSTEMI (non-ST elevated myocardial infarction) (HCC)  I21.4 410.70   2. Chronic kidney disease, unspecified CKD stage  N18.9 585.9   3. Adenocarcinoma (HCC)  C80.1 199.1   4. Abdominal pain, unspecified abdominal location  R10.9 789.00   5. Impaired mobility  Z74.09 799.89     Patient Active Problem List   Diagnosis   • Dyspnea   • Essential hypertension   • NSVT (nonsustained ventricular tachycardia)   • Malignant neoplasm of upper lobe of right lung (HCC)   • Stage 3 chronic kidney disease (HCC)   • Adenocarcinoma, lung (HCC)   • Pancytopenia due to antineoplastic chemotherapy (HCC)   • Pneumonia due to infectious organism   • Function kidney decreased   • Cellulitis   • Acute respiratory failure with hypoxia (HCC)   • Fibrotic lung diseases (HCC)   • Macrocytic anemia   • Thrombocytopenia (HCC)   • Sepsis (HCC)   • Right pneumothorax   • Hyperkalemia   • Acute renal failure superimposed on chronic kidney disease (HCC)   • NSTEMI (non-ST elevated myocardial infarction) (HCC)     Past Medical History:   Diagnosis Date   • Arthritis    • Atrial fibrillation with rapid ventricular response (HCC) 2019   • Blindness of left eye    • BPH with obstruction/lower urinary tract symptoms    • Cancer (HCC)     stomach & lung   • CHF (congestive heart failure) (HCC)    • CKD (chronic kidney disease) stage 3, GFR 30-59 ml/min (HCC)    • Coronary artery disease    • Elevated cholesterol    • Essential hypertension 10/2/2017   • Fibrotic lung diseases (HCC)    • GERD (gastroesophageal reflux disease)    • Hearing loss    • History of transfusion    • Hydronephrosis of left kidney    • Hyperlipidemia    • Hypertension     pt was taken off of all of his medications for BP (atenolol, lisinopril, lasix) because his BP kept bottoming out so his  primary dr told him to discontinue them 1-2 months ago (Jan/Feb 2019). pt states he takes no medications currently.   • Mass of duodenum versus letty hepatis  4/27/2019   • Mass of left renal hilum  4/27/2019   • Mass of upper lobe of right lung 02/2019    mass is shrinking on its own, so pt states Dr. Patel is just going to keep an eye on it and not do surgery right now.   • Mediastinal adenopathy 10/24/2018    [facility]   • Monoclonal gammopathy of unknown significance (MGUS) 9/11/2018   • Pancreatic mass     pt states he had this in 2013 but it went away on its own. Now recent CT shows it has come back so he is going to have an ultrasound on 3/13/19.   • Shortness of breath      Past Surgical History:   Procedure Laterality Date   • ABDOMINAL SURGERY     • BRONCHOSCOPY N/A 10/24/2018    Procedure: BRONCHOSCOPY WITH BIOPSY, EBUS;  Surgeon: Gareth Becerra MD;  Location: [facility] OR;  Service: Pulmonary   • CHOLECYSTECTOMY     • COLONOSCOPY N/A 1/3/2019    Procedure: COLONOSCOPY WITH ANESTHESIA;  Surgeon: Randy Somers DO;  Location: [facility] ENDOSCOPY;  Service: Gastroenterology   • CYSTOSCOPY, RETROGRADE PYELOGRAM AND STENT INSERTION Left 3/8/2019    Procedure: CYSTOSCOPY RETROGRADE BILATERAL PYELOGRAM;  Surgeon: Jos Sylvester MD;  Location: [facility] OR;  Service: Urology   • ENDOSCOPY N/A 12/11/2018    Procedure: ESOPHAGOGASTRODUODENOSCOPY WITH ANESTHESIA;  Surgeon: Randy Somers DO;  Location: [facility] ENDOSCOPY;  Service: Gastroenterology   • ENDOSCOPY N/A 4/29/2019    Procedure: ESOPHAGOGASTRODUODENOSCOPY WITH ANESTHESIA;  Surgeon: Lilliam Jj MD;  Location: [facility] OR;  Service: Gastroenterology   • ENDOSCOPY N/A 5/9/2019    Procedure: ESOPHAGOGASTRODUODENOSCOPY WITH ANESTHESIA;  Surgeon: Pilo Bansal MD;  Location: [facility] ENDOSCOPY;  Service: Gastroenterology   • EYE SURGERY Left 1964   • FOOT SURGERY Right 1966    joint   • FRACTURE SURGERY        General Information     Row Name  01/06/23 1122          Physical Therapy Time and Intention    Document Type evaluation  s/p fall at home  -     Mode of Treatment physical therapy  -     Row Name 01/06/23 1122          General Information    Patient Profile Reviewed yes  -     Prior Level of Function independent:;community mobility;ADL's;home management;driving  DME:  rw, shower chair.  walk in shower  -     Existing Precautions/Restrictions fall;oxygen therapy device and L/min  -     Barriers to Rehab none identified  -     Row Name 01/06/23 1122          Living Environment    People in Home alone  -     Row Name 01/06/23 1122          Home Main Entrance    Number of Stairs, Main Entrance two  also has ramp, but uses steps  -     Stair Railings, Main Entrance none  -     Row Name 01/06/23 1122          Stairs Within Home, Primary    Number of Stairs, Within Home, Primary none  -     Stair Railings, Within Home, Primary none  -     Row Name 01/06/23 1122          Cognition    Orientation Status (Cognition) oriented x 4  -     Row Name 01/06/23 1122          Safety Issues, Functional Mobility    Safety Issues Affecting Function (Mobility) ability to follow commands  -     Impairments Affecting Function (Mobility) balance;endurance/activity tolerance;shortness of breath;postural/trunk control  -           User Key  (r) = Recorded By, (t) = Taken By, (c) = Cosigned By    Initials Name Provider Type     Martín Lu, PT Physical Therapist               Mobility     Row Name 01/06/23 1122          Bed Mobility    Comment, (Bed Mobility) up in chair upon entering room  -     Row Name 01/06/23 1122          Sit-Stand Transfer    Sit-Stand [place] (Transfers) standby assist  -     Row Name 01/06/23 1122          Gait/Stairs (Locomotion)    [place] Level (Gait) standby assist  -     Assistive Device (Gait) walker, front-wheeled  -     Distance in Feet (Gait) 220  -     Bilateral Gait Deviations forward  flexed posture  -           User Key  (r) = Recorded By, (t) = Taken By, (c) = Cosigned By    Initials Name Provider Type     Martín Lu, PT Physical Therapist               Obj/Interventions     [facility] Name 01/06/23 1122          Range of Motion Comprehensive    General Range of Motion bilateral upper extremity ROM WFL;bilateral lower extremity ROM WFL  -[facility] Name 01/06/23 1122          Strength Comprehensive (MMT)    General Manual Muscle Testing (MMT) Assessment no strength deficits identified  -[facility] Name 01/06/23 1122          Sensory Assessment (Somatosensory)    Sensory Assessment (Somatosensory) sensation intact  -           User Key  (r) = Recorded By, (t) = Taken By, (c) = Cosigned By    Initials Name Provider Type     Martín Lu, PT Physical Therapist               Goals/Plan     [facility] Name 01/06/23 1122          Bed Mobility Goal 1 (PT)    Activity/Assistive Device (Bed Mobility Goal 1, PT) bed mobility activities, all  -     [place] Level/Cues Needed (Bed Mobility Goal 1, PT) independent  -     Time Frame (Bed Mobility Goal 1, PT) 10 days  -     Progress/Outcomes (Bed Mobility Goal 1, PT) new goal  -[facility] Name 01/06/23 1122          Transfer Goal 1 (PT)    Activity/Assistive Device (Transfer Goal 1, PT) sit-to-stand/stand-to-sit  -     [place] Level/Cues Needed (Transfer Goal 1, PT) independent  -     Time Frame (Transfer Goal 1, PT) 10 days  -     Progress/Outcome (Transfer Goal 1, PT) new goal  -[facility] Name 01/06/23 1122          Gait Training Goal 1 (PT)    Activity/Assistive Device (Gait Training Goal 1, PT) gait (walking locomotion);decrease fall risk  -     [place] Level (Gait Training Goal 1, PT) independent  -     Distance (Gait Training Goal 1, PT) 250ft  -     Time Frame (Gait Training Goal 1, PT) 10 days  -     Progress/Outcome (Gait Training Goal 1, PT) new goal  -[facility] Name 01/06/23 1122          Stairs Goal 1 (PT)     Activity/Assistive Device (Stairs Goal 1, PT) ascending stairs;descending stairs  no handrails at home  -     [place] Level/Cues Needed (Stairs Goal 1, PT) independent  -     Number of Stairs (Stairs Goal 1, PT) 2  -     Time Frame (Stairs Goal 1, PT) 10 days  -     Progress/Outcome (Stairs Goal 1, PT) new goal  -     Row Name 01/06/23 1122          Therapy Assessment/Plan (PT)    Planned Therapy Interventions (PT) balance training;bed mobility training;gait training;stair training;patient/family education;transfer training  -           User Key  (r) = Recorded By, (t) = Taken By, (c) = Cosigned By    Initials Name Provider Type     Martín Lu, PT Physical Therapist               Clinical Impression     Row Name 01/06/23 1122          Pain    Pretreatment Pain Rating 0/10 - no pain  -     Posttreatment Pain Rating 0/10 - no pain  -     Pain Intervention(s) Medication (See MAR)  -     Row Name 01/06/23 1122          Plan of Care Review    Plan of Care Reviewed With patient  -     Outcome Evaluation PT IE complete.  A&Ox4.  No c/o pain.  Pt independent PLOF.  Today he ambulated 220ft SBA x1 w/ RW on 1L NC.  Wears O2 at home prn.  PT to see for progressive ambulation w/o RW and stair training.  Recommend return home at [place].  Thank you for referral.  -     Row Name 01/06/23 1122          Therapy Assessment/Plan (PT)    Patient/Family Therapy Goals Statement (PT) return home  -     Rehab Potential (PT) good, to achieve stated therapy goals  -     Criteria for Skilled Interventions Met (PT) yes;skilled treatment is necessary  -     Therapy Frequency (PT) 2 times/day  -     Predicted Duration of Therapy Intervention (PT) until dc  -     Row Name 01/06/23 1122          Positioning and Restraints    Pre-Treatment Position sitting in chair/recliner  -     Post Treatment Position chair  -     In Chair sitting;call light within reach;encouraged to call for assist  -           User  Key  (r) = Recorded By, (t) = Taken By, (c) = Cosigned By    Initials Name Provider Type     Martín Lu, PT Physical Therapist               Outcome Measures     Row Name 01/06/23 1122          How much help from another person do you currently need...    Turning from your back to your side while in flat bed without using bedrails? 4  -LH     Moving from lying on back to sitting on the side of a flat bed without bedrails? 4  -LH     Moving to and from a bed to a chair (including a wheelchair)? 3  -LH     Standing up from a chair using your arms (e.g., wheelchair, bedside chair)? 4  -LH     Climbing 3-5 steps with a railing? 3  -LH     To walk in hospital room? 3  -     AM-PAC 6 Clicks Score (PT) 21  -     Highest level of mobility 6 --> Walked 10 steps or more  -     Row Name 01/06/23 1122          Functional Assessment    Outcome Measure Options AM-PAC 6 Clicks Basic Mobility (PT)  -           User Key  (r) = Recorded By, (t) = Taken By, (c) = Cosigned By    Initials Name Provider Type     Martín Lu, PT Physical Therapist                             Physical Therapy Education     Title: PT OT SLP Therapies (Done)     Topic: Physical Therapy (Done)     Point: Mobility training (Done)     Learning Progress Summary           Patient Acceptance, E,D, DU,VU by  at 1/6/2023 1158    Comment: benefits of PT and POC, call for A OOB                   Point: Precautions (Done)     Learning Progress Summary           Patient Acceptance, E,D, DU,VU by  at 1/6/2023 1158    Comment: benefits of PT and POC, call for A OOB                               User Key     Initials Effective Dates Name Provider Type Discipline     06/16/21 -  Martín Lu, PT Physical Therapist PT              PT Recommendation and Plan  Planned Therapy Interventions (PT): balance training, bed mobility training, gait training, stair training, patient/family education, transfer training  Plan of Care Reviewed With:  patient  Outcome Evaluation: PT IE complete.  A&Ox4.  No c/o pain.  Pt independent PLOF.  Today he ambulated 220ft SBA x1 w/ RW on 1L NC.  Wears O2 at home prn.  PT to see for progressive ambulation w/o RW and stair training.  Recommend return home at [place].  Thank you for referral.     Time Calculation:    PT Charges     Row Name 01/06/23 1158             Time Calculation    Start Time 1122  -      Stop Time 1150  -      Time Calculation (min) 28 min  -      PT Received On 01/06/23  -      PT Goal Re-Cert Due Date 01/16/23  -         Untimed Charges    PT Eval/Re-eval Minutes 28  -LH         Total Minutes    Untimed Charges Total Minutes 28  -LH       Total Minutes 28  -LH            User Key  (r) = Recorded By, (t) = Taken By, (c) = Cosigned By    Initials Name Provider Type     Martín Lu, PT Physical Therapist              Therapy Charges for Today     Code Description Service Date Service Provider Modifiers Qty    [id number] HC PT EVAL LOW COMPLEXITY 2 1/6/2023 Martín Lu, PT GP 1          PT G-Codes  Outcome Measure Options: AM-PAC 6 Clicks Basic Mobility (PT)  AM-PAC 6 Clicks Score (PT): 21  PT Discharge Summary  Anticipated Discharge Disposition (PT): home    Martín Lu PT  1/6/2023

## 2023-01-06 NOTE — PROGRESS NOTES
[facility] Medicine Services  INPATIENT PROGRESS NOTE    Patient Name: Karoline Prater  Date of Admission: 1/5/2023  Today's Date: 01/06/23  Length of Stay: 1  Primary Care Physician: Irving Alexis MD    Subjective   Chief Complaint: Loculated pleural effusion/hypoxia/weakness/hypertension    HPI   Patient is feeling a lot better and stronger today.  Blood pressure remained on the low side.  Afebrile.  Patient is on 1 L of oxygen.    Review of Systems   Constitutional: Positive for activity change, appetite change and fatigue. Negative for chills and fever.   HENT: Negative for hearing loss, nosebleeds, tinnitus and trouble swallowing.    Eyes: Negative for visual disturbance.   Respiratory: Positive for shortness of breath. Negative for cough, chest tightness and wheezing.    Cardiovascular: Negative for chest pain, palpitations and leg swelling.   Gastrointestinal: Negative for abdominal distention, abdominal pain, blood in stool, constipation, diarrhea, nausea and vomiting.   Endocrine: Negative for cold intolerance, heat intolerance, polydipsia, polyphagia and polyuria.   Genitourinary: Negative for decreased urine volume, difficulty urinating, dysuria, flank pain, frequency and hematuria.   Musculoskeletal: Positive for arthralgias, gait problem and myalgias. Negative for joint swelling.   Skin: Negative for rash.   Allergic/Immunologic: Negative for immunocompromised state.   Neurological: Positive for weakness. Negative for dizziness, syncope, light-headedness and headaches.   Hematological: Negative for adenopathy. Does not bruise/bleed easily.   Psychiatric/Behavioral: Negative for confusion and sleep disturbance. The patient is not nervous/anxious.       All pertinent negatives and positives are as above. All other systems have been reviewed and are negative unless otherwise stated.     Objective    Temp:  [98.1 °F (36.7 °C)-98.7 °F (37.1 °C)] 98.1 °F (36.7  °C)  Heart Rate:  [] 80  Resp:  [16-26] 16  BP: ()/(51-68) 96/54  Physical Exam  Vitals and nursing note reviewed.   Constitutional:       Comments: Advanced age.  Chronically ill.   HENT:      Head: Normocephalic.   Eyes:      Conjunctiva/sclera: Conjunctivae normal.      Pupils: Pupils are equal, round, and reactive to light.   Neck:      Vascular: No JVD.   Cardiovascular:      Rate and Rhythm: Normal rate and regular rhythm.      Heart sounds: Normal heart sounds.   Pulmonary:      Effort: No respiratory distress.      Breath sounds: No wheezing or rales.      Comments: Diminished breath sound bilateral, clear, on 1 L of oxygen.  Chest:      Chest wall: No tenderness.   Abdominal:      General: Bowel sounds are normal. There is no distension.      Palpations: Abdomen is soft.      Tenderness: There is no abdominal tenderness.   Musculoskeletal:         General: No tenderness or deformity.      Cervical back: Neck supple.   Skin:     General: Skin is warm and dry.      Capillary Refill: Capillary refill takes 2 to 3 seconds.      Findings: No rash.   Neurological:      Mental Status: He is alert and oriented to person, place, and time.      Cranial Nerves: No cranial nerve deficit.      Motor: Weakness present. No abnormal muscle tone.      Coordination: Coordination abnormal.      Gait: Gait abnormal.      Deep Tendon Reflexes: Reflexes normal.   Psychiatric:         Mood and Affect: Mood normal.         Behavior: Behavior normal.         Thought Content: Thought content normal.             Results Review:  I have reviewed the labs, radiology results, and diagnostic studies.    Laboratory Data:   Results from last 7 days   Lab Units 01/06/23  0620 01/05/23  1430   WBC 10*3/mm3 14.69* 19.99*   HEMOGLOBIN g/dL 12.4* 13.3   HEMATOCRIT % 39.0 41.1   PLATELETS 10*3/mm3 114* 128*        Results from last 7 days   Lab Units 01/06/23  0620 01/05/23  1505 01/05/23  1430   SODIUM mmol/L 131*  --  135*    SODIUM, ARTERIAL mmol/L  --  135*  --    POTASSIUM mmol/L 4.4  --  4.3   CHLORIDE mmol/L 103  --  102   CO2 mmol/L 21.0*  --  22.0   BUN mg/dL 49*  --  50*   CREATININE mg/dL 2.37*  --  2.63*   CALCIUM mg/dL 9.2  --  9.0   BILIRUBIN mg/dL 0.6  --  0.6   ALK PHOS U/L 78  --  80   ALT (SGPT) U/L 13  --  15   AST (SGOT) U/L 24  --  20   GLUCOSE mg/dL 89  --  184*       Culture Data:   No results found for: BLOODCX, URINECX, WOUNDCX, MRSACX, RESPCX, STOOLCX    Radiology Data:   Imaging Results (Last 24 Hours)     Procedure Component Value Units Date/Time    CT Abdomen Pelvis Without Contrast [[id number]] Collected: 01/05/23 1828     Updated: 01/05/23 1842    Narrative:      CT ABDOMEN PELVIS WO CONTRAST- 1/5/2023 6:07 PM CST     HISTORY: abdominal pain      COMPARISON: Left 16 2022      DLP: 341 mGy cm. All CT scans are performed using dose optimization  techniques as appropriate to the performed exam and including at least  one of the following: Automated exposure control, adjustment of the mA  and/or kV according to size, and the use of the iterative reconstruction  technique.     TECHNIQUE: Noncontrast enhanced images of the abdomen and pelvis  obtained without oral contrast.      FINDINGS:   Bilateral gynecomastia is present. As noted on the previous examination  there is pleural thickening within the right lateral and posterior lung  base with what appears to be a loculated effusion. The effusion shows  slight interval increase in size from the previous exam. There is  associated pleural nodularity. A portion of this effusion is subpulmonic  in location. Given the associated nodularity pleural-based metastasis  has to be considered in the differential within the right lung base.  There is interstitial fibrosis within both lungs particularly noted  within the lower lobes. There is bronchial wall thickening within both  lower lobes. Aneurysmal dilatation of the ascending thoracic aorta with  a transverse diameter of  4 cm. Heavy coronary calcifications are  present. There is mild cardiomegaly. No evidence of pericardial  effusion..      LIVER: Pneumobilia noted at the time of the previous exam has shown  diminishment. There is a small amount of residual air within the common  hepatic duct. There is minimal air within the left biliary tree. No  evidence of discrete hepatic mass..      BILIARY SYSTEM: The gallbladder is surgically absent. There is no  biliary dilatation present..      PANCREAS: No focal pancreatic lesion.      SPLEEN: A splenule is present. No evidence of splenomegaly..      KIDNEYS AND ADRENALS: The adrenals are unremarkable. There is  nonspecific bilateral perinephric stranding present. There are cortical  cysts of both kidneys. A lesion involving the lateral lower pole the  left kidney is more indeterminate in nature measuring Hounsfield units  of up to 45. This could represent a hemorrhagic cyst. A lesion involving  the posterior midpole of the left kidney also demonstrates rather high  Hounsfield units. These are stable from the previous examination. There  is a nonobstructing calculus involving the lower pole calyx of the left  kidney measuring 6 mm in size. No evidence of right-sided  nephrolithiasis..  The ureters are decompressed and normal in  appearance.     RETROPERITONEUM: No mass, lymphadenopathy or hemorrhage.      GI TRACT: There is mild constipation. No obstruction or free air.. The  appendix is visualized and unremarkable. There is a loop of small bowel  which has been brought up into the right upper quadrant suggesting  previous biliary diversion.     OTHER: There is no mesenteric mass, lymphadenopathy or fluid collection.  The osseous structures and soft tissues demonstrate no worrisome  lesions. There is listhesis at both the L4-L5 and L5-S1 level. There is  bilateral spondylolysis at L5. The listhesis at L4-L5 is likely related  to subluxation at the level of the facets.      PELVIS:  Bilateral fat-containing inguinal hernias are present. The  prostate gland is mildly enlarged. There is no free fluid in the pelvis.  There is calcific tendinosis of both common hamstring insertions at  their insertion on the ischial tuberosity.. The urinary bladder is  normal in appearance.       Impression:      1. Mild constipation. Otherwise normal bowel gas pattern.  2. Right-sided pleural effusion which appears loculated with associated  pleural thickening. The pleural thickening is somewhat nodular in  appearance and if the patient has a prior history of lung neoplasm could  potentially represent pleural-based studding. This shows some  progression from the previous examination. The effusion shows slight  interval increase in size. There is interstitial fibrosis within both  lung bases.  3. Mild aneurysmal dilatation of the ascending thoracic aorta. Heavy  coronary calcifications are present. There is no pericardial effusion.  4. Cortical cysts of both kidneys. There are again 2 lesions within the  left kidney which are more indeterminate but which are stable from the  previous exam. There is nonobstructing left-sided nephrolithiasis. No  evidence of ureteral stone or obstructive uropathy. No free fluid or  localized inflammatory process is demonstrated.  5. Small bilateral fat-containing inguinal hernias. The prostate gland  is enlarged.  6. Listhesis at the L4-L5 and L5-S1 level..         This report was finalized on 01/05/2023 18:39 by Dr. Naveed Reynolds MD.    XR Chest 1 View [[id number]] Collected: 01/05/23 1417     Updated: 01/05/23 1422    Narrative:      EXAMINATION: XR CHEST 1 VW- 1/5/2023 2:17 PM CST     HISTORY: cough with congestion     REPORT: A frontal view of the chest was obtained.     COMPARISON: Chest x-ray 08/26/2021.     The lungs are hypoaerated, there aren't coarse reticular interstitial  markings diffusely as before most compatible with advanced pulmonary  fibrosis. There is chronic  pleural thickening in the right lateral lung  base. No superimposed lung consolidation is identified. Heart size is  normal. The right internal jugular port catheter appears in good  position as before. No acute osseous abnormality. The upper abdomen  appears unremarkable.       Impression:      Extensive chronic pulmonary fibrosis which appears stable,  no definite superimposed acute infiltrate is identified. Chronic pleural  thickening and/or small right pleural effusion. Stable satisfactory  position of the right-sided port catheter.     This report was finalized on 01/05/2023 14:19 by Dr. Allen Churchill MD.          I have reviewed the patient's current medications.     Assessment/Plan   Assessment  Active Hospital Problems    Diagnosis    • **NSTEMI (non-ST elevated myocardial infarction) (HCC)    • Fibrotic lung diseases (HCC)    • Stage 3 chronic kidney disease (HCC)    • Adenocarcinoma, lung (HCC)        Treatment Plan    Nausea/vomiting/umbilicus hernia.  CT surgeon consult.  CT abdomen pelvic-Mild constipation- normal bowel gas pattern, Right-sided pleural effusion which appears loculated with associated pleural thickening-  pleural thickening is somewhat nodular in appearance and if the patient has a prior history of lung neoplasm could potentially represent pleural-based studding- some progression from the previous examination- effusion shows slight interval increase in size, interstitial fibrosis within both  lung bases, Mild aneurysmal dilatation of the ascending thoracic aorta- Heavy coronary calcifications are presen- no pericardial effusion, Cortical cysts of both kidneys- again 2 lesions within the left kidney which are more indeterminate but which are stable from the previous exam, s nonobstructing left-sided nephrolithiasis- No evidence of ureteral stone or obstructive uropathy- No free fluid or localized inflammatory process is demonstrated, Small bilateral fat-containing inguinal hernias,  prostate gland is enlarged, Listhesis at the L4-L5 and L5-S1 level..    Right-sided pleural tcyfpnra-lyhevvdhd-dzlhqcu thickening/interstitial fibrosis.  Patient is on chronic 2 L oxygen at home off-and-on.  Leukocytosis improving..  Maxipime antibiotics.  Vancomycin.  Consult CT surgeon.  On 1 L of oxygen.    Acute on chronic renal failure stage III.  Previous creatinine 8/26/2021 2.21.  Slow lactated ringer.    Cortical cysts in both kidneys-stable from previous examination/nonobstructive left-sided nephrolithiasis.    Listhesis L4-L5 and L5-S1.    Nausea.  Zofran as needed.    Insomnia.  Melatonin as needed.    Atrial fibrillation/hypertension/CAD.  Patient is currently in sinus rhythm.  Telemetry.  Trend troponin-stable.  BNP normal.  Echocardiogram pending.    Thrombocytopenia.  We will follow-up    History lung cancer adenocarcinoma .  Patient follow with Dr. Michele outpatient.  Patient stated he underwent 2 years of chemotherapy.  Chest x-ray-Extensive chronic pulmonary fibrosis which appears stable-no definite superimposed acute infiltrate is identified, Chronic pleural thickening and/or small right pleural effusion- Stable satisfactory position of the right-sided port catheter.  On 1 L of oxygen.    Nutrition . regular/house diet.    Falling/deconditioning.  Patient lives alone.  PT and OT consult.  Patient usually get around with a cane sometimes.    Blood culture pending.  COVID-19-negative.  Flu screen-negative.    Medical Decision Making  Number and Complexity of problems: Loculated pleural effusion/failure to thrive/renal failure  Differential Diagnosis: Loculated effusion/history of lung cancer/hypoxia/weakness    Conditions and Status  Stable.     MDM Data  External documents reviewed: None  Cardiac tracing (EKG, telemetry) interpretation: Normal sinus rhythm.  Radiology interpretation: CT scan/x-ray .  Labs reviewed: Lab review .  Any tests that were considered but not ordered: Lab in a.m.       Discussed with: Patient     Care Planning  Shared decision making: Patient .  Code status and discussions: Full code     Disposition  Social Determinants of Health that impact treatment or disposition: Patient might need rehab placement  2 to 5 days.    Electronically signed by Zachary Lloyd MD, 01/06/23, 07:51 CST.

## 2023-01-06 NOTE — CASE MANAGEMENT/SOCIAL WORK
Discharge Planning Assessment  [facility]     Patient Name: Karoline Prater  MRN: [id number]  Today's Date: 1/6/2023    Admit Date: 1/5/2023        Discharge Needs Assessment     Row Name 01/06/23 1404       Living Environment    People in Home alone    Current Living Arrangements home    Primary Care Provided by self    Provides Primary Care For no one    Family Caregiver if Needed other relative(s)    Family Caregiver Names Brother and Niece    Quality of Family Relationships helpful;involved;supportive    Able to Return to Prior Arrangements yes       Resource/Environmental Concerns    Resource/Environmental Concerns none    Transportation Concerns none       Transition Planning    Patient/Family Anticipates Transition to home    Patient/Family Anticipated Services at Transition none    Transportation Anticipated family or friend will provide       Discharge Needs Assessment    Readmission Within the Last 30 Days no previous admission in last 30 days    Equipment Currently Used at Home cane, straight;oxygen    Concerns to be Addressed denies needs/concerns at this time    Anticipated Changes Related to Illness none    Equipment Needed After Discharge none    Discharge Coordination/Progress Spoke with pt at the bedside for DC planning.  Has a PCP and Rx coverage.  Denies the need for any DME or HH upon DC.  Will follow for any DC needs.               Discharge Plan    No documentation.               Continued Care and Services - Admitted Since 1/5/2023    Coordination has not been started for this encounter.       Expected Discharge Date and Time     Expected Discharge Date Expected Discharge Time    Jan 7, 2023          Demographic Summary    No documentation.                Functional Status    No documentation.                Psychosocial    No documentation.                Abuse/Neglect    No documentation.                Legal    No documentation.                Substance Abuse    No documentation.                 Patient Forms    No documentation.                   Jazmyne Girard RN

## 2023-01-06 NOTE — H&P
[facility] Medicine Services  HISTORY AND PHYSICAL    Date of Admission: 1/5/2023  Primary Care Physician: Irving Alexis MD    Subjective   Primary Historian: Patient     Chief Complaint: Fall with generalized weakness     History of Present Illness  82-year-old gentleman who presented to the emergency department after a fall today.  He went to the bathroom and his feet tripped him up.  He was unable to get up because he was too weak.  His family came and helped him up.  He has had chills and shaking for the last 2 days.  He has had weakness and fatigue.  He has had decreased urine output.  He has had decreased appetite with some nausea.  He denies any chest pain.  His blood pressure was low when he presented to the emergency department.  He does live alone and remains independent.  He continues to drive.  The patient does have past medical history to include lung cancer, A. fib, and hypertension.    Cardiac echo 7/23/2020:  Interpretation Summary    • Estimated EF = 55%.  • Left ventricular systolic function is normal.  • Mild tricuspid valve regurgitation is present.  • Left ventricular diastolic dysfunction (grade I) consistent with impaired relaxation.  • Mild pulmonary hypertension is present.      Review of Systems   Fall  Weakness  Nausea    Otherwise complete ROS reviewed and negative except as mentioned in the HPI.    Past Medical History:   Past Medical History:   Diagnosis Date   • Arthritis    • Atrial fibrillation with rapid ventricular response (HCC) 5/31/2019   • Blindness of left eye    • BPH with obstruction/lower urinary tract symptoms    • Cancer (HCC)     stomach & lung   • CHF (congestive heart failure) ([facility])    • CKD (chronic kidney disease) stage 3, GFR 30-59 ml/min ([facility])    • Coronary artery disease    • Elevated cholesterol    • Essential hypertension 10/2/2017   • Fibrotic lung diseases ([facility])    • GERD (gastroesophageal reflux disease)    •  Hearing loss    • History of transfusion    • Hydronephrosis of left kidney    • Hyperlipidemia    • Hypertension     pt was taken off of all of his medications for BP (atenolol, lisinopril, lasix) because his BP kept bottoming out so his primary dr told him to discontinue them 1-2 months ago (Jan/Feb 2019). pt states he takes no medications currently.   • Mass of duodenum versus letty hepatis  4/27/2019   • Mass of left renal hilum  4/27/2019   • Mass of upper lobe of right lung 02/2019    mass is shrinking on its own, so pt states Dr. Patel is just going to keep an eye on it and not do surgery right now.   • Mediastinal adenopathy 10/24/2018    [facility]   • Monoclonal gammopathy of unknown significance (MGUS) 9/11/2018   • Pancreatic mass     pt states he had this in 2013 but it went away on its own. Now recent CT shows it has come back so he is going to have an ultrasound on 3/13/19.   • Shortness of breath      Past Surgical History:  Past Surgical History:   Procedure Laterality Date   • ABDOMINAL SURGERY     • BRONCHOSCOPY N/A 10/24/2018    Procedure: BRONCHOSCOPY WITH BIOPSY, EBUS;  Surgeon: Gareth Becerra MD;  Location: [facility] OR;  Service: Pulmonary   • CHOLECYSTECTOMY     • COLONOSCOPY N/A 1/3/2019    Procedure: COLONOSCOPY WITH ANESTHESIA;  Surgeon: Randy Somers DO;  Location: [facility] ENDOSCOPY;  Service: Gastroenterology   • CYSTOSCOPY, RETROGRADE PYELOGRAM AND STENT INSERTION Left 3/8/2019    Procedure: CYSTOSCOPY RETROGRADE BILATERAL PYELOGRAM;  Surgeon: Jos Sylvester MD;  Location: [facility] OR;  Service: Urology   • ENDOSCOPY N/A 12/11/2018    Procedure: ESOPHAGOGASTRODUODENOSCOPY WITH ANESTHESIA;  Surgeon: Randy Somers DO;  Location: [facility] ENDOSCOPY;  Service: Gastroenterology   • ENDOSCOPY N/A 4/29/2019    Procedure: ESOPHAGOGASTRODUODENOSCOPY WITH ANESTHESIA;  Surgeon: Lilliam Jj MD;  Location: [facility] OR;  Service: Gastroenterology   • ENDOSCOPY N/A 5/9/2019    Procedure:  ESOPHAGOGASTRODUODENOSCOPY WITH ANESTHESIA;  Surgeon: Pilo Bansal MD;  Location: [facility] ENDOSCOPY;  Service: Gastroenterology   • EYE SURGERY Left 1964   • FOOT SURGERY Right 1966    joint   • FRACTURE SURGERY       Social History:  reports that he quit smoking about 19 years ago. His smoking use included cigarettes. He quit smokeless tobacco use about 53 years ago.  His smokeless tobacco use included chew. He reports that he does not drink alcohol and does not use drugs.    Family History: family history includes Hypertension in his mother; Leukemia in his father.       Allergies:  Allergies   Allergen Reactions   • Penicillins Hives       Medications:  Prior to Admission medications    Medication Sig Start Date End Date Taking? Authorizing Provider   acetaminophen (TYLENOL) 500 MG tablet Take 1,000 mg by mouth Every Night.    Eliecer Schultz MD   furosemide (LASIX) 20 MG tablet Take 20 mg by mouth 2 (Two) Times a Day.    Eliecer Schultz MD   losartan (COZAAR) 50 MG tablet Take 50 mg by mouth Daily.    Eliecer Schultz MD   melatonin 5 MG tablet tablet Take 10 mg by mouth Every Night.    Eliecer Schultz MD   metoprolol succinate XL (TOPROL-XL) 25 MG 24 hr tablet Take 12.5 mg by mouth Daily.    Eliecer Schultz MD   multivitamin with minerals tablet tablet Take 1 tablet by mouth Daily.    Eliecer Schultz MD   pantoprazole (PROTONIX) 40 MG EC tablet Take 1 tablet by mouth Daily. 4/30/19   Hang Reddy DO   sodium bicarbonate 650 MG tablet Take 1 tablet by mouth 3 (Three) Times a Day. 8/27/21   Meeta Romero APRN   tamsulosin (FLOMAX) 0.4 MG capsule 24 hr capsule Take 1 capsule by mouth Every Night.    Eliecer Schultz MD     I have utilized all available immediate resources to obtain, update, or review the patient's current medications (including all prescriptions, over-the-counter products, herbals, cannabis/cannabidiol products, and vitamin/mineral/dietary  "(nutritional) supplements).    Objective     Vital Signs: /66 (BP Location: Right arm, Patient Position: Sitting)   Pulse 102   Temp 98.3 °F (36.8 °C) (Oral)   Resp 18   Ht 162.6 cm (64\")   Wt 79 kg (174 lb 3.2 oz)   SpO2 91%   BMI 29.90 kg/m²   Physical Exam  Vitals reviewed.   Constitutional:       Appearance: He is ill-appearing.   HENT:      Head: Normocephalic and atraumatic.      Right Ear: External ear normal.      Left Ear: External ear normal.      Nose: Nose normal.      Mouth/Throat:      Mouth: Mucous membranes are moist.      Pharynx: No oropharyngeal exudate.   Eyes:      General: No scleral icterus.     Comments: Abnormal left eye.    Cardiovascular:      Rate and Rhythm: Normal rate and regular rhythm.      Heart sounds: Normal heart sounds.   Pulmonary:      Effort: Pulmonary effort is normal.      Breath sounds: Normal breath sounds.   Abdominal:      General: There is no distension.      Palpations: Abdomen is soft.   Musculoskeletal:         General: No tenderness.      Cervical back: Normal range of motion and neck supple.      Right lower leg: Edema present.      Left lower leg: Edema present.      Comments: Non-pitting LE edema   Skin:     General: Skin is warm and dry.   Neurological:      General: No focal deficit present.      Mental Status: He is alert.      Cranial Nerves: No cranial nerve deficit.   Psychiatric:         Mood and Affect: Mood normal.         Behavior: Behavior normal.       Results Reviewed:  Lab Results (last 24 hours)     Procedure Component Value Units Date/Time    Troponin [[id number]]  (Abnormal) Collected: 01/05/23 1740    Specimen: Blood from Arm, Right Updated: 01/05/23 1824     Troponin T 0.065 ng/mL     Narrative:      Troponin T Reference Range:  <= 0.03 ng/mL-   Negative for AMI  >0.03 ng/mL-     Abnormal for myocardial necrosis.  Clinicians would have to utilize clinical acumen, EKG, Troponin and serial changes to determine if it is an Acute " Myocardial Infarction or myocardial injury due to an underlying chronic condition.       Results may be falsely decreased if patient taking Biotin.      COVID-19 and FLU A/B PCR - Swab, Nasopharynx [[id number]]  (Normal) Collected: 01/05/23 1436    Specimen: Swab from Nasopharynx Updated: 01/05/23 1544     COVID19 Not Detected     Influenza A PCR Not Detected     Influenza B PCR Not Detected    Narrative:      Fact sheet for providers: https://www.fda.gov/media/156642/download    Fact sheet for patients: https://www.fda.gov/media/436570/download    Test performed by PCR.    Comprehensive Metabolic Panel [[id number]]  (Abnormal) Collected: 01/05/23 1430    Specimen: Blood from Arm, Left Updated: 01/05/23 1524     Glucose 184 mg/dL      BUN 50 mg/dL      Creatinine 2.63 mg/dL      Sodium 135 mmol/L      Potassium 4.3 mmol/L      Chloride 102 mmol/L      CO2 22.0 mmol/L      Calcium 9.0 mg/dL      Total Protein 6.7 g/dL      Albumin 3.1 g/dL      ALT (SGPT) 15 U/L      AST (SGOT) 20 U/L      Alkaline Phosphatase 80 U/L      Total Bilirubin 0.6 mg/dL      Globulin 3.6 gm/dL      A/G Ratio 0.9 g/dL      BUN/Creatinine Ratio 19.0     Anion Gap 11.0 mmol/L      eGFR 23.8 mL/min/1.73      Comment: National Kidney Foundation and American Society of Nephrology (ASN) Task Force recommended calculation based on the Chronic Kidney Disease Epidemiology Collaboration (CKD-EPI) equation refit without adjustment for race.       Narrative:      GFR Normal >60  Chronic Kidney Disease <60  Kidney Failure <15    The GFR formula is only valid for adults with stable renal function between ages 18 and 70.    CK [[id number]]  (Normal) Collected: 01/05/23 1430    Specimen: Blood from Arm, Left Updated: 01/05/23 1524     Creatine Kinase 148 U/L     Troponin [[id number]]  (Abnormal) Collected: 01/05/23 1430    Specimen: Blood from Arm, Left Updated: 01/05/23 1521     Troponin T 0.051 ng/mL     Narrative:      Troponin T Reference Range:  <= 0.03  ng/mL-   Negative for AMI  >0.03 ng/mL-     Abnormal for myocardial necrosis.  Clinicians would have to utilize clinical acumen, EKG, Troponin and serial changes to determine if it is an Acute Myocardial Infarction or myocardial injury due to an underlying chronic condition.       Results may be falsely decreased if patient taking Biotin.      BNP [[id number]]  (Normal) Collected: 01/05/23 1430    Specimen: Blood from Arm, Left Updated: 01/05/23 1519     proBNP 753.2 pg/mL     Narrative:      Among patients with dyspnea, NT-proBNP is highly sensitive for the detection of acute congestive heart failure. In addition NT-proBNP of <300 pg/ml effectively rules out acute congestive heart failure with 99% negative predictive value.      Lipase [[id number]]  (Abnormal) Collected: 01/05/23 1430    Specimen: Blood from Arm, Left Updated: 01/05/23 1519     Lipase 9 U/L     Magnesium [[id number]]  (Normal) Collected: 01/05/23 1430    Specimen: Blood from Arm, Left Updated: 01/05/23 1518     Magnesium 1.8 mg/dL     Lactic Acid, Plasma [[id number]]  (Normal) Collected: 01/05/23 1430    Specimen: Blood from Arm, Left Updated: 01/05/23 1515     Lactate 2.0 mmol/L     Blood Gas, Arterial With Co-Ox [[id number]]  (Abnormal) Collected: 01/05/23 1505    Specimen: Arterial Blood Updated: 01/05/23 1515     Site Left Radial     Gregg's Test Positive     pH, Arterial 7.447 pH units      pCO2, Arterial 31.5 mm Hg      Comment: 84 Value below reference range        pO2, Arterial 61.9 mm Hg      Comment: 84 Value below reference range        HCO3, Arterial 21.7 mmol/L      Base Excess, Arterial -1.5 mmol/L      Comment: 84 Value below reference range        O2 Saturation, Arterial 93.2 %      Comment: 84 Value below reference range        Hemoglobin, Blood Gas 13.4 g/dL      Comment: 84 Value below reference range        Hematocrit, Blood Gas 41.1 %      Oxyhemoglobin 90.5 %      Comment: 84 Value below reference range        Methemoglobin 0.50 %       Carboxyhemoglobin 2.3 %      A-a DO2 50.5 mmHg      Temperature 37.0 C      Sodium, Arterial 135 mmol/L      Comment: 84 Value below reference range        Potassium, Arterial 4.2 mmol/L      Barometric Pressure for Blood Gas 752 mmHg      Modality Nasal Cannula     Flow Rate 1.0 lpm      Ventilator Mode NA     Note --     Collected by [id number]     Comment: Meter: [id number]     :  [id number]        pH, Temp Corrected 7.447 pH Units      pCO2, Temperature Corrected 31.5 mm Hg      pO2, Temperature Corrected 61.9 mm Hg     Blood Culture - Blood, Arm, Left [[id number]] Collected: 01/05/23 1430    Specimen: Blood from Arm, Left Updated: 01/05/23 1513    Blood Culture - Blood, Arm, Left [[id number]] Collected: 01/05/23 1430    Specimen: Blood from Arm, Left Updated: 01/05/23 1513    aPTT [[id number]]  (Normal) Collected: 01/05/23 1430    Specimen: Blood from Arm, Left Updated: 01/05/23 1511     PTT 33.5 seconds     Protime-INR [[id number]]  (Abnormal) Collected: 01/05/23 1430    Specimen: Blood from Arm, Left Updated: 01/05/23 1511     Protime 16.2 Seconds      INR 1.29    Urinalysis With Culture If Indicated - Straight Cath [[id number]]  (Abnormal) Collected: 01/05/23 1445    Specimen: Urine from Straight Cath Updated: 01/05/23 1503     Color, UA Yellow     Appearance, UA Clear     pH, UA 8.0     Specific Gravity, UA 1.012     Glucose, UA Negative     Ketones, UA Negative     Bilirubin, UA Negative     Blood, UA Negative     Protein, UA Trace     Leuk Esterase, UA Negative     Nitrite, UA Negative     Urobilinogen, UA 0.2 E.U./dL    Narrative:      In absence of clinical symptoms, the presence of pyuria, bacteria, and/or nitrites on the urinalysis result does not correlate with infection.  Urine microscopic not indicated.    CBC & Differential [[id number]]  (Abnormal) Collected: 01/05/23 1430    Specimen: Blood from Arm, Left Updated: 01/05/23 1500    Narrative:      The following orders were created for panel  order CBC & Differential.  Procedure                               Abnormality         Status                     ---------                               -----------         ------                     CBC Auto Differential[[id number]]        Abnormal            Final result                 Please view results for these tests on the individual orders.    CBC Auto Differential [[id number]]  (Abnormal) Collected: 01/05/23 1430    Specimen: Blood from Arm, Left Updated: 01/05/23 1500     WBC 19.99 10*3/mm3      RBC 4.40 10*6/mm3      Hemoglobin 13.3 g/dL      Hematocrit 41.1 %      MCV 93.4 fL      MCH 30.2 pg      MCHC 32.4 g/dL      RDW 14.5 %      RDW-SD 50.2 fl      MPV 12.6 fL      Platelets 128 10*3/mm3      Neutrophil % 91.8 %      Lymphocyte % 1.9 %      Monocyte % 4.8 %      Eosinophil % 0.0 %      Basophil % 0.1 %      Immature Grans % 1.4 %      Neutrophils, Absolute 18.37 10*3/mm3      Lymphocytes, Absolute 0.37 10*3/mm3      Monocytes, Absolute 0.95 10*3/mm3      Eosinophils, Absolute 0.00 10*3/mm3      Basophils, Absolute 0.02 10*3/mm3      Immature Grans, Absolute 0.28 10*3/mm3      nRBC 0.0 /100 WBC         Imaging Results (Last 24 Hours)     Procedure Component Value Units Date/Time    CT Abdomen Pelvis Without Contrast [[id number]] Collected: 01/05/23 1828     Updated: 01/05/23 1842    Narrative:      CT ABDOMEN PELVIS WO CONTRAST- 1/5/2023 6:07 PM CST     HISTORY: abdominal pain      COMPARISON: Left 16 2022      DLP: 341 mGy cm. All CT scans are performed using dose optimization  techniques as appropriate to the performed exam and including at least  one of the following: Automated exposure control, adjustment of the mA  and/or kV according to size, and the use of the iterative reconstruction  technique.     TECHNIQUE: Noncontrast enhanced images of the abdomen and pelvis  obtained without oral contrast.      FINDINGS:   Bilateral gynecomastia is present. As noted on the previous examination  there is  pleural thickening within the right lateral and posterior lung  base with what appears to be a loculated effusion. The effusion shows  slight interval increase in size from the previous exam. There is  associated pleural nodularity. A portion of this effusion is subpulmonic  in location. Given the associated nodularity pleural-based metastasis  has to be considered in the differential within the right lung base.  There is interstitial fibrosis within both lungs particularly noted  within the lower lobes. There is bronchial wall thickening within both  lower lobes. Aneurysmal dilatation of the ascending thoracic aorta with  a transverse diameter of 4 cm. Heavy coronary calcifications are  present. There is mild cardiomegaly. No evidence of pericardial  effusion..      LIVER: Pneumobilia noted at the time of the previous exam has shown  diminishment. There is a small amount of residual air within the common  hepatic duct. There is minimal air within the left biliary tree. No  evidence of discrete hepatic mass..      BILIARY SYSTEM: The gallbladder is surgically absent. There is no  biliary dilatation present..      PANCREAS: No focal pancreatic lesion.      SPLEEN: A splenule is present. No evidence of splenomegaly..      KIDNEYS AND ADRENALS: The adrenals are unremarkable. There is  nonspecific bilateral perinephric stranding present. There are cortical  cysts of both kidneys. A lesion involving the lateral lower pole the  left kidney is more indeterminate in nature measuring Hounsfield units  of up to 45. This could represent a hemorrhagic cyst. A lesion involving  the posterior midpole of the left kidney also demonstrates rather high  Hounsfield units. These are stable from the previous examination. There  is a nonobstructing calculus involving the lower pole calyx of the left  kidney measuring 6 mm in size. No evidence of right-sided  nephrolithiasis..  The ureters are decompressed and normal in  appearance.      RETROPERITONEUM: No mass, lymphadenopathy or hemorrhage.      GI TRACT: There is mild constipation. No obstruction or free air.. The  appendix is visualized and unremarkable. There is a loop of small bowel  which has been brought up into the right upper quadrant suggesting  previous biliary diversion.     OTHER: There is no mesenteric mass, lymphadenopathy or fluid collection.  The osseous structures and soft tissues demonstrate no worrisome  lesions. There is listhesis at both the L4-L5 and L5-S1 level. There is  bilateral spondylolysis at L5. The listhesis at L4-L5 is likely related  to subluxation at the level of the facets.      PELVIS: Bilateral fat-containing inguinal hernias are present. The  prostate gland is mildly enlarged. There is no free fluid in the pelvis.  There is calcific tendinosis of both common hamstring insertions at  their insertion on the ischial tuberosity.. The urinary bladder is  normal in appearance.       Impression:      1. Mild constipation. Otherwise normal bowel gas pattern.  2. Right-sided pleural effusion which appears loculated with associated  pleural thickening. The pleural thickening is somewhat nodular in  appearance and if the patient has a prior history of lung neoplasm could  potentially represent pleural-based studding. This shows some  progression from the previous examination. The effusion shows slight  interval increase in size. There is interstitial fibrosis within both  lung bases.  3. Mild aneurysmal dilatation of the ascending thoracic aorta. Heavy  coronary calcifications are present. There is no pericardial effusion.  4. Cortical cysts of both kidneys. There are again 2 lesions within the  left kidney which are more indeterminate but which are stable from the  previous exam. There is nonobstructing left-sided nephrolithiasis. No  evidence of ureteral stone or obstructive uropathy. No free fluid or  localized inflammatory process is demonstrated.  5. Small  bilateral fat-containing inguinal hernias. The prostate gland  is enlarged.  6. Listhesis at the L4-L5 and L5-S1 level..         This report was finalized on 01/05/2023 18:39 by Dr. Naveed Reynolds MD.    XR Chest 1 View [[id number]] Collected: 01/05/23 1417     Updated: 01/05/23 1422    Narrative:      EXAMINATION: XR CHEST 1 VW- 1/5/2023 2:17 PM CST     HISTORY: cough with congestion     REPORT: A frontal view of the chest was obtained.     COMPARISON: Chest x-ray 08/26/2021.     The lungs are hypoaerated, there aren't coarse reticular interstitial  markings diffusely as before most compatible with advanced pulmonary  fibrosis. There is chronic pleural thickening in the right lateral lung  base. No superimposed lung consolidation is identified. Heart size is  normal. The right internal jugular port catheter appears in good  position as before. No acute osseous abnormality. The upper abdomen  appears unremarkable.       Impression:      Extensive chronic pulmonary fibrosis which appears stable,  no definite superimposed acute infiltrate is identified. Chronic pleural  thickening and/or small right pleural effusion. Stable satisfactory  position of the right-sided port catheter.     This report was finalized on 01/05/2023 14:19 by Dr. Allen Churchill MD.        I have personally reviewed and interpreted the radiology studies and ECG obtained at time of admission.     Assessment / Plan   Assessment:   Active Hospital Problems    Diagnosis    • **NSTEMI (non-ST elevated myocardial infarction) (HCC)      Impression:  1. Elevated troponin  2. SIRS  3. Generalized weakness  4. Lung cancer, known   5. Hypertension    Treatment Plan  1.  Admit to telemetry  2.  Cardiac echo in the morning  3.  Follow-up labs to include troponin in the morning  4.  Follow-up EKG in the morning  5.  Continue Merrem and add vancomycin  6. CKD stage 3    The patient will be admitted to my service here at [facility]. Primary team  to take over care in the am.     Medical Decision Making  Number and Complexity of problems: 4, high  Differential Diagnosis: PNA    Conditions and Status        Condition is worsening.     MDM Data  External documents reviewed: None  Cardiac tracing (EKG, telemetry) interpretation: None  Radiology interpretation: None  Labs reviewed: All  Any tests that were considered but not ordered: None     Decision rules/scores evaluated (example [id number], Wells, etc): None     Discussed with: Patient and ED staff     Care Planning  Shared decision making: Patient and Ed staff  Code status and discussions: Full    Disposition  Social Determinants of Health that impact treatment or disposition: Patient lives alone  Estimated length of stay is 2-3 days.     I confirmed that the patient's advanced care plan is present, code status is documented, and a surrogate decision maker is listed in the patient's medical record.     The patient's surrogate decision maker is family.     The patient was seen and examined by me on 1/5/23 at 9.    Electronically signed by Jen Peñaloza DO, 01/05/23, 22:00 CST.

## 2023-01-06 NOTE — CONSULTS
[facility] HEART GROUP CONSULT NOTE    Referring Provider: Jh  Reason for Consultation: elevated troponin    Patient Care Team:  Irving Alexis MD as PCP - General (General Practice)  Gareth Becerra MD as Consulting Physician (Pulmonary Disease)  Randy Somers DO as Consulting Physician (Gastroenterology)  Jos Sylvester MD as Consulting Physician (Urology)  Jeff Michele MD as Consulting Physician (Oncology)    Chief complaint weakness and fall    Subjective .     History of present illness:  This patient is an 82 year old white male with a history of mild coronary artery disease, nonsustained ventricular tachycardia during an EGD, paroxysmal atrial fibrillation, and nonischemic cardiomyopathy.  He follows with cardiology at [facility].  He presents to the ER after a fall yesterday.  He reports he had chills for the past 1-2 days.  He got up to use the restroom, had some pain in his right hip, his legs gave way, and he fell.  He was unable to get up on his own.  His brother, thankfully, stopped by shortly after and helped him get up.  He says that overall, he has been feeling weak and fatigued.  He denies any obvious signs and symptoms of acute illness.  He denies any chest pain whatsoever over the past few weeks.  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  He denies heart palpitations, orthopnea, edema, or syncope.      Review of Systems  A 10-point review of systems is obtained and negative except for otherwise mentioned above.    History  Past Medical History:   Diagnosis Date   • Arthritis    • Atrial fibrillation with rapid ventricular response (HCC) 5/31/2019   • Blindness of left eye    • BPH with obstruction/lower urinary tract symptoms    • Cancer (HCC)     stomach & lung   • CHF (congestive heart failure) ([facility])    • CKD (chronic kidney disease) stage  3, GFR 30-59 ml/min (HCC)    • Coronary artery disease    • Elevated cholesterol    • Essential hypertension 10/2/2017   • Fibrotic lung diseases (HCC)    • GERD (gastroesophageal reflux disease)    • Hearing loss    • History of transfusion    • Hydronephrosis of left kidney    • Hyperlipidemia    • Hypertension     pt was taken off of all of his medications for BP (atenolol, lisinopril, lasix) because his BP kept bottoming out so his primary dr told him to discontinue them 1-2 months ago (Jan/Feb 2019). pt states he takes no medications currently.   • Mass of duodenum versus letty hepatis  4/27/2019   • Mass of left renal hilum  4/27/2019   • Mass of upper lobe of right lung 02/2019    mass is shrinking on its own, so pt states Dr. Patel is just going to keep an eye on it and not do surgery right now.   • Mediastinal adenopathy 10/24/2018    Station 7   • Monoclonal gammopathy of unknown significance (MGUS) 9/11/2018   • Pancreatic mass     pt states he had this in 2013 but it went away on its own. Now recent CT shows it has come back so he is going to have an ultrasound on 3/13/19.   • Shortness of breath    ,   Past Surgical History:   Procedure Laterality Date   • ABDOMINAL SURGERY     • BRONCHOSCOPY N/A 10/24/2018    Procedure: BRONCHOSCOPY WITH BIOPSY, EBUS;  Surgeon: Gareth Becerra MD;  Location: [facility] OR;  Service: Pulmonary   • CHOLECYSTECTOMY     • COLONOSCOPY N/A 1/3/2019    Procedure: COLONOSCOPY WITH ANESTHESIA;  Surgeon: Randy Somers DO;  Location: [facility] ENDOSCOPY;  Service: Gastroenterology   • CYSTOSCOPY, RETROGRADE PYELOGRAM AND STENT INSERTION Left 3/8/2019    Procedure: CYSTOSCOPY RETROGRADE BILATERAL PYELOGRAM;  Surgeon: Jos Sylvester MD;  Location: [facility] OR;  Service: Urology   • ENDOSCOPY N/A 12/11/2018    Procedure: ESOPHAGOGASTRODUODENOSCOPY WITH ANESTHESIA;  Surgeon: Randy Somers DO;  Location: [facility] ENDOSCOPY;  Service: Gastroenterology   • ENDOSCOPY N/A 4/29/2019     Procedure: ESOPHAGOGASTRODUODENOSCOPY WITH ANESTHESIA;  Surgeon: Lilliam Jj MD;  Location: [facility] OR;  Service: Gastroenterology   • ENDOSCOPY N/A 2019    Procedure: ESOPHAGOGASTRODUODENOSCOPY WITH ANESTHESIA;  Surgeon: Pilo Bansal MD;  Location: [facility] ENDOSCOPY;  Service: Gastroenterology   • EYE SURGERY Left    • FOOT SURGERY Right     joint   • FRACTURE SURGERY     ,   Family History   Problem Relation Age of Onset   • Hypertension Mother    • Leukemia Father    ,   Social History     Tobacco Use   • Smoking status: Former     Years: 49.00     Types: Cigarettes     Quit date: 10/29/2003     Years since quittin.2   • Smokeless tobacco: Former     Types: Chew     Quit date:    Substance Use Topics   • Alcohol use: No   • Drug use: No   ,   Medications Prior to Admission   Medication Sig Dispense Refill Last Dose   • acetaminophen (TYLENOL) 500 MG tablet Take 1,000 mg by mouth Every Night.      • furosemide (LASIX) 20 MG tablet Take 20 mg by mouth 2 (Two) Times a Day.      • losartan (COZAAR) 50 MG tablet Take 50 mg by mouth Daily.      • melatonin 5 MG tablet tablet Take 10 mg by mouth Every Night.      • metoprolol succinate XL (TOPROL-XL) 25 MG 24 hr tablet Take 12.5 mg by mouth Daily.      • multivitamin with minerals tablet tablet Take 1 tablet by mouth Daily.      • pantoprazole (PROTONIX) 40 MG EC tablet Take 1 tablet by mouth Daily. 30 tablet 1    • psyllium (METAMUCIL) 58.6 % packet Take 1 packet by mouth Daily.      • sodium bicarbonate 650 MG tablet Take 1 tablet by mouth 3 (Three) Times a Day. 90 tablet 0    • tamsulosin (FLOMAX) 0.4 MG capsule 24 hr capsule Take 1 capsule by mouth Every Night.       and Allergies:  Penicillins    Objective     Vital Sign Min/Max for last 24 hours  Temp  Min: 98.1 °F (36.7 °C)  Max: 98.7 °F (37.1 °C)   BP  Min: 80/55  Max: 122/66   Pulse  Min: 80  Max: 114   Resp  Min: 16  Max: 26   SpO2  Min: 90 %  Max: 96 %   No data recorded  "  Weight  Min: 77.1 kg (170 lb)  Max: 79 kg (174 lb 3.2 oz)     Flowsheet Rows    Flowsheet Row First Filed Value   Admission Height 162.6 cm (64\") Documented at 01/05/2023 1448   Admission Weight 77.1 kg (170 lb) Documented at 01/05/2023 1448             Ejection Fraction  No results found for: EF    Echo EF Estimated  Lab Results   Component Value Date    ECHOEFEST 55 07/24/2020       Physical Exam:     General Appearance:    Alert, cooperative, in no acute distress   Head:    Normocephalic, without obvious abnormality, atraumatic   Eyes:            Lids and lashes normal, conjunctivae and sclerae normal, no   icterus, PERRLA, EOMI   Throat:   Oral mucosa pink and moist   Neck:   No adenopathy, supple, trachea midline, no thyromegaly, no   carotid bruit, no JVD   Lungs:     Clear to auscultation bilaterally,respirations regular, even     and unlabored    Heart:    Regular rhythm and normal rate, normal S1 and S2, no            murmur, no gallop, no rub, no click   Chest Wall:    No abnormalities observed   Abdomen:     Normal bowel sounds present in all four quadrants, no       masses, no organomegaly, soft non-tender, non-distended    Extremities:   No edema, no cyanosis, no clubbing   Pulses:   Pulses palpable and equal bilaterally   Skin:   No bleeding, bruising or rash   Psychiatric:   Displays appropriate mood and affect           Results Review:    I reviewed the patient's new clinical results.    Results from last 7 days   Lab Units 01/06/23  0620   WBC 10*3/mm3 14.69*   HEMOGLOBIN g/dL 12.4*   HEMATOCRIT % 39.0   PLATELETS 10*3/mm3 114*     Results from last 7 days   Lab Units 01/06/23  0620   SODIUM mmol/L 131*   POTASSIUM mmol/L 4.4   CHLORIDE mmol/L 103   CO2 mmol/L 21.0*   BUN mg/dL 49*   CREATININE mg/dL 2.37*   GLUCOSE mg/dL 89   CALCIUM mg/dL 9.2     Results from last 7 days   Lab Units 01/06/23  0620   SODIUM mmol/L 131*   POTASSIUM mmol/L 4.4   CHLORIDE mmol/L 103   CO2 mmol/L 21.0*   BUN mg/dL " 49*   CREATININE mg/dL 2.37*   CALCIUM mg/dL 9.2   BILIRUBIN mg/dL 0.6   ALK PHOS U/L 78   ALT (SGPT) U/L 13   AST (SGOT) U/L 24   GLUCOSE mg/dL 89     Results from last 7 days   Lab Units 01/06/23  0620 01/05/23  1740 01/05/23  1430   CK TOTAL U/L  --   --  148   TROPONIN T ng/mL 0.050* 0.065* 0.051*         Results from last 7 days   Lab Units 01/06/23  0620   CHOLESTEROL mg/dL 83   TRIGLYCERIDES mg/dL 94   HDL CHOL mg/dL 26*   LDL CHOL mg/dL 38     Assessment & Plan     Elevated troponin:  Labs display a flat trend.  EKG reveals no concerning ST-T wave changes.  Patient denies any chest pain.  He has chronic, unchanged shortness of breath.  Troponin elevation is likely secondary to myocardial injury (not ischemia) from acute renal failure.  The diagnosis of NSTEMI should be removed as primary admitting diagnosis as there is no concern for ACS at this time.    Generalized weakness    Fall     Nonischemic cardiomyopathy:  This patient follows with cardiology at [facility].  Most recent echo on file (July 2020) reveals EF of 55%. Toprol-XL, losartan, and Lasix are presently being held due to hypotension.  Continue these when/if able.  He is presently stable from a heart failure standpoint and without any evidence of volume overload.    Mild coronary artery disease:  He denies any concerning signs or symptoms of ischemia.  Toprol-XL and losartan are presently on hold because of hypotension.  Resume when/if able.    History of nonsustained ventricular tachycardia:  Resume Toprol-XL when/if able.    Hypertension now with episodes of hypotension:  Antihypertensives are being held.    Lung CA    Anemia    Thank you kindly for the consult.  Further recommendations will be forthcoming from Dr. Rojas.    I discussed the patient's findings and my recommendations with patient, family and nursing staff    Melissa Butt PA-C  01/06/23  09:35 CST

## 2023-01-06 NOTE — PLAN OF CARE
Goal Outcome Evaluation:         Patient came from ED with non-stemi. No c/o chest pain. His main complaint was weakness and soa. Oxygen wnl on 1L. Cardiology consulted. IV abx for possible sepsis. NSR/ST  on tele

## 2023-01-07 LAB
ALBUMIN SERPL-MCNC: 2.9 G/DL (ref 3.5–5.2)
ALBUMIN/GLOB SERPL: 0.7 G/DL
ALP SERPL-CCNC: 75 U/L (ref 39–117)
ALT SERPL W P-5'-P-CCNC: 14 U/L (ref 1–41)
ANION GAP SERPL CALCULATED.3IONS-SCNC: 11 MMOL/L (ref 5–15)
AST SERPL-CCNC: 27 U/L (ref 1–40)
B PARAPERT DNA SPEC QL NAA+PROBE: NOT DETECTED
B PERT DNA SPEC QL NAA+PROBE: NOT DETECTED
BASOPHILS # BLD AUTO: 0.02 10*3/MM3 (ref 0–0.2)
BASOPHILS NFR BLD AUTO: 0.2 % (ref 0–1.5)
BILIRUB SERPL-MCNC: 0.5 MG/DL (ref 0–1.2)
BUN SERPL-MCNC: 39 MG/DL (ref 8–23)
BUN/CREAT SERPL: 19 (ref 7–25)
C PNEUM DNA NPH QL NAA+NON-PROBE: NOT DETECTED
CALCIUM SPEC-SCNC: 9.9 MG/DL (ref 8.6–10.5)
CHLORIDE SERPL-SCNC: 106 MMOL/L (ref 98–107)
CHOLEST SERPL-MCNC: 95 MG/DL (ref 0–200)
CO2 SERPL-SCNC: 21 MMOL/L (ref 22–29)
CREAT SERPL-MCNC: 2.05 MG/DL (ref 0.76–1.27)
DEPRECATED RDW RBC AUTO: 50.4 FL (ref 37–54)
EGFRCR SERPLBLD CKD-EPI 2021: 32.2 ML/MIN/1.73
EOSINOPHIL # BLD AUTO: 0.06 10*3/MM3 (ref 0–0.4)
EOSINOPHIL NFR BLD AUTO: 0.6 % (ref 0.3–6.2)
ERYTHROCYTE [DISTWIDTH] IN BLOOD BY AUTOMATED COUNT: 14.4 % (ref 12.3–15.4)
FLUAV SUBTYP SPEC NAA+PROBE: NOT DETECTED
FLUBV RNA ISLT QL NAA+PROBE: NOT DETECTED
GLOBULIN UR ELPH-MCNC: 3.9 GM/DL
GLUCOSE SERPL-MCNC: 114 MG/DL (ref 65–99)
HADV DNA SPEC NAA+PROBE: NOT DETECTED
HCOV 229E RNA SPEC QL NAA+PROBE: NOT DETECTED
HCOV HKU1 RNA SPEC QL NAA+PROBE: NOT DETECTED
HCOV NL63 RNA SPEC QL NAA+PROBE: NOT DETECTED
HCOV OC43 RNA SPEC QL NAA+PROBE: NOT DETECTED
HCT VFR BLD AUTO: 40.7 % (ref 37.5–51)
HDLC SERPL-MCNC: 23 MG/DL (ref 40–60)
HGB BLD-MCNC: 12.6 G/DL (ref 13–17.7)
HMPV RNA NPH QL NAA+NON-PROBE: NOT DETECTED
HPIV1 RNA ISLT QL NAA+PROBE: NOT DETECTED
HPIV2 RNA SPEC QL NAA+PROBE: NOT DETECTED
HPIV3 RNA NPH QL NAA+PROBE: NOT DETECTED
HPIV4 P GENE NPH QL NAA+PROBE: NOT DETECTED
IMM GRANULOCYTES # BLD AUTO: 0.06 10*3/MM3 (ref 0–0.05)
IMM GRANULOCYTES NFR BLD AUTO: 0.6 % (ref 0–0.5)
L PNEUMO1 AG UR QL IA: NEGATIVE
LDLC SERPL CALC-MCNC: 50 MG/DL (ref 0–100)
LDLC/HDLC SERPL: 2.09 {RATIO}
LYMPHOCYTES # BLD AUTO: 0.69 10*3/MM3 (ref 0.7–3.1)
LYMPHOCYTES NFR BLD AUTO: 6.6 % (ref 19.6–45.3)
M PNEUMO IGG SER IA-ACNC: NOT DETECTED
MCH RBC QN AUTO: 29.6 PG (ref 26.6–33)
MCHC RBC AUTO-ENTMCNC: 31 G/DL (ref 31.5–35.7)
MCV RBC AUTO: 95.5 FL (ref 79–97)
MONOCYTES # BLD AUTO: 1.02 10*3/MM3 (ref 0.1–0.9)
MONOCYTES NFR BLD AUTO: 9.8 % (ref 5–12)
MRSA DNA SPEC QL NAA+PROBE: NORMAL
NEUTROPHILS NFR BLD AUTO: 8.6 10*3/MM3 (ref 1.7–7)
NEUTROPHILS NFR BLD AUTO: 82.2 % (ref 42.7–76)
NRBC BLD AUTO-RTO: 0 /100 WBC (ref 0–0.2)
PLATELET # BLD AUTO: 122 10*3/MM3 (ref 140–450)
PMV BLD AUTO: 12.5 FL (ref 6–12)
POTASSIUM SERPL-SCNC: 4.1 MMOL/L (ref 3.5–5.2)
PROT SERPL-MCNC: 6.8 G/DL (ref 6–8.5)
RBC # BLD AUTO: 4.26 10*6/MM3 (ref 4.14–5.8)
RHINOVIRUS RNA SPEC NAA+PROBE: NOT DETECTED
RSV RNA NPH QL NAA+NON-PROBE: NOT DETECTED
S PNEUM AG SPEC QL LA: NEGATIVE
SARS-COV-2 RNA NPH QL NAA+NON-PROBE: NOT DETECTED
SODIUM SERPL-SCNC: 138 MMOL/L (ref 136–145)
TRIGL SERPL-MCNC: 120 MG/DL (ref 0–150)
TSH SERPL DL<=0.05 MIU/L-ACNC: 1.15 UIU/ML (ref 0.27–4.2)
VLDLC SERPL-MCNC: 22 MG/DL (ref 5–40)
WBC NRBC COR # BLD: 10.45 10*3/MM3 (ref 3.4–10.8)

## 2023-01-07 PROCEDURE — 25010000002 ENOXAPARIN PER 10 MG: Performed by: INTERNAL MEDICINE

## 2023-01-07 PROCEDURE — 80061 LIPID PANEL: CPT | Performed by: FAMILY MEDICINE

## 2023-01-07 PROCEDURE — 85025 COMPLETE CBC W/AUTO DIFF WBC: CPT | Performed by: FAMILY MEDICINE

## 2023-01-07 PROCEDURE — 87641 MR-STAPH DNA AMP PROBE: CPT | Performed by: FAMILY MEDICINE

## 2023-01-07 PROCEDURE — 0202U NFCT DS 22 TRGT SARS-COV-2: CPT | Performed by: FAMILY MEDICINE

## 2023-01-07 PROCEDURE — 97116 GAIT TRAINING THERAPY: CPT

## 2023-01-07 PROCEDURE — 80053 COMPREHEN METABOLIC PANEL: CPT | Performed by: FAMILY MEDICINE

## 2023-01-07 PROCEDURE — 87449 NOS EACH ORGANISM AG IA: CPT | Performed by: FAMILY MEDICINE

## 2023-01-07 PROCEDURE — 97110 THERAPEUTIC EXERCISES: CPT

## 2023-01-07 PROCEDURE — 84443 ASSAY THYROID STIM HORMONE: CPT | Performed by: FAMILY MEDICINE

## 2023-01-07 PROCEDURE — 25010000002 CEFEPIME PER 500 MG: Performed by: INTERNAL MEDICINE

## 2023-01-07 PROCEDURE — 25010000002 VANCOMYCIN 10 G RECONSTITUTED SOLUTION: Performed by: INTERNAL MEDICINE

## 2023-01-07 RX ORDER — METOPROLOL SUCCINATE 25 MG/1
12.5 TABLET, EXTENDED RELEASE ORAL DAILY
Status: DISCONTINUED | OUTPATIENT
Start: 2023-01-07 | End: 2023-01-07

## 2023-01-07 RX ORDER — TAMSULOSIN HYDROCHLORIDE 0.4 MG/1
0.4 CAPSULE ORAL NIGHTLY
Status: DISCONTINUED | OUTPATIENT
Start: 2023-01-07 | End: 2023-01-10 | Stop reason: HOSPADM

## 2023-01-07 RX ORDER — PANTOPRAZOLE SODIUM 40 MG/1
40 TABLET, DELAYED RELEASE ORAL
Status: DISCONTINUED | OUTPATIENT
Start: 2023-01-07 | End: 2023-01-10 | Stop reason: HOSPADM

## 2023-01-07 RX ADMIN — VANCOMYCIN HYDROCHLORIDE 750 MG: 10 INJECTION, POWDER, LYOPHILIZED, FOR SOLUTION INTRAVENOUS at 21:33

## 2023-01-07 RX ADMIN — ENOXAPARIN SODIUM 30 MG: 30 INJECTION SUBCUTANEOUS at 08:30

## 2023-01-07 RX ADMIN — METOPROLOL SUCCINATE 12.5 MG: 25 TABLET, EXTENDED RELEASE ORAL at 01:25

## 2023-01-07 RX ADMIN — TAMSULOSIN HYDROCHLORIDE 0.4 MG: 0.4 CAPSULE ORAL at 20:29

## 2023-01-07 RX ADMIN — DILTIAZEM HYDROCHLORIDE 5 MG/HR: 5 INJECTION INTRAVENOUS at 00:07

## 2023-01-07 RX ADMIN — METOPROLOL TARTRATE 25 MG: 25 TABLET, FILM COATED ORAL at 20:29

## 2023-01-07 RX ADMIN — Medication 6 MG: at 20:29

## 2023-01-07 RX ADMIN — Medication 10 ML: at 20:29

## 2023-01-07 RX ADMIN — Medication 10 ML: at 08:27

## 2023-01-07 RX ADMIN — METOPROLOL TARTRATE 25 MG: 25 TABLET, FILM COATED ORAL at 13:03

## 2023-01-07 RX ADMIN — CEFEPIME 2 G: 2 INJECTION, POWDER, FOR SOLUTION INTRAVENOUS at 17:04

## 2023-01-07 RX ADMIN — PANTOPRAZOLE SODIUM 40 MG: 40 TABLET, DELAYED RELEASE ORAL at 08:30

## 2023-01-07 RX ADMIN — DILTIAZEM HYDROCHLORIDE 5 MG/HR: 5 INJECTION INTRAVENOUS at 17:53

## 2023-01-07 RX ADMIN — ACETAMINOPHEN 650 MG: 325 TABLET, FILM COATED ORAL at 20:29

## 2023-01-07 NOTE — CASE MANAGEMENT/SOCIAL WORK
Order for placement. Pt saying he will return home alone, says family close and can assist as needed.  Pt walked 220ft with therapy yesterday.  He also does not want  care.  Will follow.

## 2023-01-07 NOTE — THERAPY TREATMENT NOTE
Acute Care - Physical Therapy Treatment Note  [facility]     Patient Name: Karoline Prater  : 1940  MRN: [id number]  Today's Date: 2023      Visit Dx:     ICD-10-CM ICD-9-CM   1. NSTEMI (non-ST elevated myocardial infarction) (HCC)  I21.4 410.70   2. Chronic kidney disease, unspecified CKD stage  N18.9 585.9   3. Adenocarcinoma (HCC)  C80.1 199.1   4. Abdominal pain, unspecified abdominal location  R10.9 789.00   5. Impaired mobility  Z74.09 799.89     Patient Active Problem List   Diagnosis   • Dyspnea   • Essential hypertension   • NSVT (nonsustained ventricular tachycardia)   • Malignant neoplasm of upper lobe of right lung (HCC)   • Stage 3 chronic kidney disease (HCC)   • Adenocarcinoma, lung (HCC)   • Pancytopenia due to antineoplastic chemotherapy (HCC)   • Pneumonia due to infectious organism   • Function kidney decreased   • Cellulitis   • Acute respiratory failure with hypoxia (HCC)   • Fibrotic lung diseases (HCC)   • Macrocytic anemia   • Thrombocytopenia (HCC)   • Sepsis (HCC)   • Right pneumothorax   • Hyperkalemia   • Acute renal failure superimposed on chronic kidney disease (HCC)     Past Medical History:   Diagnosis Date   • Arthritis    • Atrial fibrillation with rapid ventricular response (HCC) 2019   • Blindness of left eye    • BPH with obstruction/lower urinary tract symptoms    • Cancer (HCC)     stomach & lung   • CHF (congestive heart failure) (HCC)    • CKD (chronic kidney disease) stage 3, GFR 30-59 ml/min (HCC)    • Coronary artery disease    • Elevated cholesterol    • Essential hypertension 10/2/2017   • Fibrotic lung diseases (HCC)    • GERD (gastroesophageal reflux disease)    • Hearing loss    • History of transfusion    • Hydronephrosis of left kidney    • Hyperlipidemia    • Hypertension     pt was taken off of all of his medications for BP (atenolol, lisinopril, lasix) because his BP kept bottoming out so his primary dr told him to discontinue them 1-2 months  ago (Jan/Feb 2019). pt states he takes no medications currently.   • Mass of duodenum versus letty hepatis  4/27/2019   • Mass of left renal hilum  4/27/2019   • Mass of upper lobe of right lung 02/2019    mass is shrinking on its own, so pt states Dr. Patel is just going to keep an eye on it and not do surgery right now.   • Mediastinal adenopathy 10/24/2018    Station [facility]   • Monoclonal gammopathy of unknown significance (MGUS) 9/11/2018   • Pancreatic mass     pt states he had this in 2013 but it went away on its own. Now recent CT shows it has come back so he is going to have an ultrasound on 3/13/19.   • Shortness of breath      Past Surgical History:   Procedure Laterality Date   • ABDOMINAL SURGERY     • BRONCHOSCOPY N/A 10/24/2018    Procedure: BRONCHOSCOPY WITH BIOPSY, EBUS;  Surgeon: Gareth Becerra MD;  Location: [facility] OR;  Service: Pulmonary   • CHOLECYSTECTOMY     • COLONOSCOPY N/A 1/3/2019    Procedure: COLONOSCOPY WITH ANESTHESIA;  Surgeon: Randy Somers DO;  Location: [facility] ENDOSCOPY;  Service: Gastroenterology   • CYSTOSCOPY, RETROGRADE PYELOGRAM AND STENT INSERTION Left 3/8/2019    Procedure: CYSTOSCOPY RETROGRADE BILATERAL PYELOGRAM;  Surgeon: Jos Sylvester MD;  Location: [facility] OR;  Service: Urology   • ENDOSCOPY N/A 12/11/2018    Procedure: ESOPHAGOGASTRODUODENOSCOPY WITH ANESTHESIA;  Surgeon: Randy Somers DO;  Location: [facility] ENDOSCOPY;  Service: Gastroenterology   • ENDOSCOPY N/A 4/29/2019    Procedure: ESOPHAGOGASTRODUODENOSCOPY WITH ANESTHESIA;  Surgeon: Lilliam Jj MD;  Location: [facility] OR;  Service: Gastroenterology   • ENDOSCOPY N/A 5/9/2019    Procedure: ESOPHAGOGASTRODUODENOSCOPY WITH ANESTHESIA;  Surgeon: Pilo Bansal MD;  Location: [facility] ENDOSCOPY;  Service: Gastroenterology   • EYE SURGERY Left 1964   • FOOT SURGERY Right 1966    joint   • FRACTURE SURGERY       PT Assessment (last 12 hours)     PT Evaluation and Treatment     Row Name 01/07/23 1128           Physical Therapy Time and Intention    Subjective Information complains of;dyspnea  -     Document Type therapy note (daily note)  -     Mode of Treatment physical therapy  -     Row Name 01/07/23 1128          General Information    Existing Precautions/Restrictions fall;oxygen therapy device and L/min  -     Row Name 01/07/23 1128          Pain    Pretreatment Pain Rating 0/10 - no pain  -     Posttreatment Pain Rating 0/10 - no pain  -     Row Name 01/07/23 1128          Bed Mobility    Comment, (Bed Mobility) up in chair  -     Row Name 01/07/23 1128          Sit-Stand Transfer    Sit-Stand [place] (Transfers) standby assist  -     Row Name 01/07/23 1128          Stand-Sit Transfer    Stand-Sit [place] (Transfers) contact guard  -[facility] Name 01/07/23 1128          Gait/Stairs (Locomotion)    [place] Level (Gait) contact guard  -     Assistive Device (Gait) walker, front-wheeled  -     Distance in Feet (Gait) 100' x 2 with one standing rest  -     Deviations/Abnormal Patterns (Gait) hailey decreased;stride length decreased  -     Bilateral Gait Deviations forward flexed posture  -     Comment, (Gait/Stairs) Nsg notified PTA that pt's HR was up and adjusted pt's IV medication while walking. Pt. c/o SOA during walk.   -     Row Name 01/07/23 1128          Hip (Therapeutic Exercise)    Hip (Therapeutic Exercise) AROM (active range of motion)  -     Hip AROM (Therapeutic Exercise) bilateral;flexion;aBduction;aDduction;sitting;15 repititions  -     Row Name 01/07/23 1128          Knee (Therapeutic Exercise)    Knee (Therapeutic Exercise) AROM (active range of motion)  -     Knee AROM (Therapeutic Exercise) bilateral;LAQ (long arc quad);sitting;15 repititions  -     Row Name 01/07/23 1128          Ankle (Therapeutic Exercise)    Ankle (Therapeutic Exercise) AROM (active range of motion)  -     Ankle AROM (Therapeutic Exercise)  bilateral;dorsiflexion;sitting;15 repititions  -     Row Name 01/07/23 1128          Plan of Care Review    Plan of Care Reviewed With patient  -     Progress no change  -     Outcome Evaluation Pt. agreeable to therapy. He actively participated with LE exercises. Pt in A-fib and his HR ranged from the 120s-130s during seated exercises. It elevated up into the 150's with ambulation. Pt. was SBA/CGA for transfers and gait. He walked 100' x 2 with one standing rest. Pt.. reported increased SOA with activity. His O2 sat was 90% while on 2L. Will continue to work on strength and endurance while he is here.  -     Row Name 01/07/23 1128          Vital Signs    Intratreatment Heart Rate (beats/min) 154   130s-150s bpm  -     Intra SpO2 (%) 90  -     O2 Delivery Intra Treatment supplemental O2  2l  -     Row Name 01/07/23 1128          Positioning and Restraints    Pre-Treatment Position sitting in chair/recliner  -     Post Treatment Position chair  -MF     In Chair reclined;encouraged to call for assist;call light within reach;with family/caregiver  -           User Key  (r) = Recorded By, (t) = Taken By, (c) = Cosigned By    Initials Name Provider Type    Lucrecia Roblero, PTA Physical Therapist Assistant                Physical Therapy Education     Title: PT OT SLP Therapies (In Progress)     Topic: Physical Therapy (Done)     Point: Mobility training (Done)     Learning Progress Summary           Patient Acceptance, E,D, DU,VU by  at 1/6/2023 1158    Comment: benefits of PT and POC, call for A OOB                   Point: Precautions (Done)     Learning Progress Summary           Patient Acceptance, E,D, DU,VU by  at 1/6/2023 1158    Comment: benefits of PT and POC, call for A OOB                               User Key     Initials Effective Dates Name Provider Type Discipline     06/16/21 -  Martín Lu, PT Physical Therapist PT              PT Recommendation and Plan     Plan of Care  Reviewed With: patient  Progress: no change  Outcome Evaluation: Pt. agreeable to therapy. He actively participated with LE exercises. Pt in A-fib and his HR ranged from the 120s-130s during seated exercises. It elevated up into the 150's with ambulation. Pt. was SBA/CGA for transfers and gait. He walked 100' x 2 with one standing rest. Pt.. reported increased SOA with activity. His O2 sat was 90% while on 2L. Will continue to work on strength and endurance while he is here.   Outcome Measures     Row Name 01/07/23 1128             How much help from another person do you currently need...    Turning from your back to your side while in flat bed without using bedrails? 4  -MF      Moving from lying on back to sitting on the side of a flat bed without bedrails? 4  -MF      Moving to and from a bed to a chair (including a wheelchair)? 3  -MF      Standing up from a chair using your arms (e.g., wheelchair, bedside chair)? 3  -MF      Climbing 3-5 steps with a railing? 3  -MF      To walk in hospital room? 3  -MF      AM-PAC 6 Clicks Score (PT) 20  -MF         Functional Assessment    Outcome Measure Options AM-PAC 6 Clicks Basic Mobility (PT)  -MF            User Key  (r) = Recorded By, (t) = Taken By, (c) = Cosigned By    Initials Name Provider Type    Lucrecia Roblero, PTA Physical Therapist Assistant                 Time Calculation:    PT Charges     Row Name 01/07/23 1315             Time Calculation    Start Time 1128  -      Stop Time 1200  -      Time Calculation (min) 32 min  -MF      PT Received On 01/07/23  -         Time Calculation- PT    Total Timed Code Minutes- PT 32 minute(s)  -MF         Timed Charges    79149 - PT Therapeutic Exercise Minutes 17  -MF      14445 - Gait Training Minutes  15  -MF         Total Minutes    Timed Charges Total Minutes 32  -MF       Total Minutes 32  -MF            User Key  (r) = Recorded By, (t) = Taken By, (c) = Cosigned By    Initials Name Provider Type      Lucrecia Mello PTA Physical Therapist Assistant              Therapy Charges for Today     Code Description Service Date Service Provider Modifiers Qty    08540763502 HC PT THER PROC EA 15 MIN 1/7/2023 Lucrecia Mello PTA GP 1    47880517619 HC GAIT TRAINING EA 15 MIN 1/7/2023 Lucrecia Mello PTA GP 1          PT G-Codes  Outcome Measure Options: AM-PAC 6 Clicks Basic Mobility (PT)  AM-PAC 6 Clicks Score (PT): 20  AM-PAC 6 Clicks Score (OT): 19    Lucrecia Mello PTA  1/7/2023

## 2023-01-07 NOTE — PLAN OF CARE
Goal Outcome Evaluation:  Plan of Care Reviewed With: patient        Progress: no change  Outcome Evaluation: Pt. agreeable to therapy. He actively participated with LE exercises. Pt in A-fib and his HR ranged from the 120s-130s during seated exercises. It elevated up into the 150's with ambulation. Pt. was SBA/CGA for transfers and gait. He walked 100' x 2 with one standing rest. Pt.. reported increased SOA with activity. His O2 sat was 90% while on 2L. Will continue to work on strength and endurance while he is here.

## 2023-01-07 NOTE — PROGRESS NOTES
"Pharmacy Dosing Service  Pharmacokinetics  Vancomycin Follow-up Evaluation    Assessment/Action/Plan:  Current Order: Vancomycin 750 mg IVPB every 24 hours  Current end date:1/15/2023  Levels: Vancomycin trough ordered before dose due on 1/8/2023 at 2100   Additional antimicrobial agent(s): Cefepime    Continue Vancomycin 750 mg iv q24h. Pharmacy will continue to follow daily and adjust dose accordingly.     Subjective:  Karoline Prater is a 82 y.o. male currently on Vancomycin for the treatment of Sepsis, day 2 of treatment.    AUC Model Data:  Regimen: 750 mg IV every 24 hours.  Exposure target: AUC24 (range)400-600 mg/L.hr   AUC24,ss: 475 mg/L.hr  PAUC*: 68 %  Ctrough,ss: 16.1 mg/L  Pconc*: 30 %  Tox.: 11 %    Objective:  Ht: 162.6 cm (64\"); Wt: 79 kg (174 lb 3.2 oz)  Estimated Creatinine Clearance: 27.3 mL/min (A) (by C-G formula based on SCr of 2.05 mg/dL (H)).   Creatinine   Date Value Ref Range Status   01/07/2023 2.05 (H) 0.76 - 1.27 mg/dL Final   01/06/2023 2.37 (H) 0.76 - 1.27 mg/dL Final   01/05/2023 2.63 (H) 0.76 - 1.27 mg/dL Final   12/17/2020 1.8 (H) 0.6 - 1.2 mg/dL Final   12/03/2020 1.5 (H) 0.6 - 1.2 mg/dL Final   10/30/2020 1.8 (H) 0.6 - 1.2 mg/dL Final      Lab Results   Component Value Date    WBC 10.45 01/07/2023    WBC 14.69 (H) 01/06/2023    WBC 19.99 (H) 01/05/2023         Lab Results   Component Value Date    VANCOTROUGH 10.05 05/31/2019       Culture Results:  Microbiology Results (last 10 days)       Procedure Component Value - Date/Time    COVID-19 and FLU A/B PCR - Swab, Nasopharynx [[id number]]  (Normal) Collected: 01/05/23 1436    Lab Status: Final result Specimen: Swab from Nasopharynx Updated: 01/05/23 1544     COVID19 Not Detected     Influenza A PCR Not Detected     Influenza B PCR Not Detected    Narrative:      Fact sheet for providers: https://www.fda.gov/media/994273/download    Fact sheet for patients: https://www.fda.gov/media/503715/download    Test performed by PCR.    " Blood Culture - Blood, Arm, Left [[id number]]  (Normal) Collected: 01/05/23 1430    Lab Status: Preliminary result Specimen: Blood from Arm, Left Updated: 01/06/23 1515     Blood Culture No growth at 24 hours    Blood Culture - Blood, Arm, Left [[id number]]  (Normal) Collected: 01/05/23 1430    Lab Status: Preliminary result Specimen: Blood from Arm, Left Updated: 01/06/23 1515     Blood Culture No growth at 24 hours            Claudio Aldrich, PharmD   01/07/23 13:53 CST

## 2023-01-07 NOTE — PROGRESS NOTES
[facility] Medicine Services  INPATIENT PROGRESS NOTE    Patient Name: Karoline Prater  Date of Admission: 1/5/2023  Today's Date: 01/07/23  Length of Stay: 2  Primary Care Physician: Irving Alexis MD    Subjective   Chief Complaint: Loculated pleural effusion/hypoxia/weakness/hypertension    HPI   Patient states she is feeling better.  Episode of atrial for with RVR last night, Cardizem drip was started, will start patient on Lopressor twice a day today and try to wean off Cardizem drip.  No anticoagulation due to patient's is a fall risks.  Continue Lovenox prophylaxis for now.  Loculated effusion, ongoing antibiotic treatment, conservative treatment per CT scan.  Possible DC vancomycin once if MRSA screen is negative.  Creatinine is improving.  Leukocytosis normalized.  Thrombocytopenia improving.  Patient is on 1 L of oxygen.    Review of Systems   Constitutional: Positive for activity change, appetite change and fatigue. Negative for chills and fever.   HENT: Negative for hearing loss, nosebleeds, tinnitus and trouble swallowing.    Eyes: Negative for visual disturbance.   Respiratory: Positive for shortness of breath. Negative for cough, chest tightness and wheezing.    Cardiovascular: Negative for chest pain, palpitations and leg swelling.   Gastrointestinal: Negative for abdominal distention, abdominal pain, blood in stool, constipation, diarrhea, nausea and vomiting.   Endocrine: Negative for cold intolerance, heat intolerance, polydipsia, polyphagia and polyuria.   Genitourinary: Negative for decreased urine volume, difficulty urinating, dysuria, flank pain, frequency and hematuria.   Musculoskeletal: Positive for arthralgias, gait problem and myalgias. Negative for joint swelling.   Skin: Negative for rash.   Allergic/Immunologic: Negative for immunocompromised state.   Neurological: Positive for weakness. Negative for dizziness, syncope, light-headedness  and headaches.   Hematological: Negative for adenopathy. Does not bruise/bleed easily.   Psychiatric/Behavioral: Negative for confusion and sleep disturbance. The patient is not nervous/anxious.    All pertinent negatives and positives are as above. All other systems have been reviewed and are negative unless otherwise stated.     Objective    Temp:  [97.4 °F (36.3 °C)-98.6 °F (37 °C)] 97.4 °F (36.3 °C)  Heart Rate:  [] 105  Resp:  [16-18] 18  BP: ()/(47-80) 105/67  Physical Exam  Vitals and nursing note reviewed.   Constitutional:       Comments: Advanced age.  Chronically ill.   HENT:      Head: Normocephalic.   Eyes:      Conjunctiva/sclera: Conjunctivae normal.      Pupils: Pupils are equal, round, and reactive to light.   Neck:      Vascular: No JVD.   Cardiovascular:      Rate and Rhythm: Normal rate and regular rhythm.      Heart sounds: Normal heart sounds.   Pulmonary:      Effort: No respiratory distress.      Breath sounds: No wheezing or rales.      Comments: Diminished breath sound bilateral, clear, on 1 L of oxygen.  Chest:      Chest wall: No tenderness.   Abdominal:      General: Bowel sounds are normal. There is no distension.      Palpations: Abdomen is soft.      Tenderness: There is no abdominal tenderness.   Musculoskeletal:         General: No tenderness or deformity.      Cervical back: Neck supple.   Skin:     General: Skin is warm and dry.      Capillary Refill: Capillary refill takes 2 to 3 seconds.      Findings: No rash.   Neurological:      Mental Status: He is alert and oriented to person, place, and time.      Cranial Nerves: No cranial nerve deficit.      Motor: Weakness present. No abnormal muscle tone.      Coordination: Coordination abnormal.      Gait: Gait abnormal.      Deep Tendon Reflexes: Reflexes normal.   Psychiatric:         Mood and Affect: Mood normal.         Behavior: Behavior normal.         Thought Content: Thought content normal.       Results Review:  I  have reviewed the labs, radiology results, and diagnostic studies.    Laboratory Data:   Results from last 7 days   Lab Units 01/07/23  0435 01/06/23  0620 01/05/23  1430   WBC 10*3/mm3 10.45 14.69* 19.99*   HEMOGLOBIN g/dL 12.6* 12.4* 13.3   HEMATOCRIT % 40.7 39.0 41.1   PLATELETS 10*3/mm3 122* 114* 128*        Results from last 7 days   Lab Units 01/07/23  0435 01/06/23  0620 01/05/23  1505 01/05/23  1430   SODIUM mmol/L 138 131*  --  135*   SODIUM, ARTERIAL mmol/L  --   --  135*  --    POTASSIUM mmol/L 4.1 4.4  --  4.3   CHLORIDE mmol/L 106 103  --  102   CO2 mmol/L 21.0* 21.0*  --  22.0   BUN mg/dL 39* 49*  --  50*   CREATININE mg/dL 2.05* 2.37*  --  2.63*   CALCIUM mg/dL 9.9 9.2  --  9.0   BILIRUBIN mg/dL 0.5 0.6  --  0.6   ALK PHOS U/L 75 78  --  80   ALT (SGPT) U/L 14 13  --  15   AST (SGOT) U/L 27 24  --  20   GLUCOSE mg/dL 114* 89  --  184*       Culture Data:   Blood Culture   Date Value Ref Range Status   01/05/2023 No growth at 24 hours  Preliminary   01/05/2023 No growth at 24 hours  Preliminary       Radiology Data:   Imaging Results (Last 24 Hours)     ** No results found for the last 24 hours. **          I have reviewed the patient's current medications.     Assessment/Plan   Assessment  Active Hospital Problems    Diagnosis    • **Adenocarcinoma, lung (HCC)    • Fibrotic lung diseases (HCC)    • Stage 3 chronic kidney disease (HCC)        Treatment Plan  Atrial fibrillation/hypertension/CAD.    Patient is currently in atrial fibrillation with RVR, worsening when he walked or ambulate.  Try to wean down and stop Cardizem drip..  Telemetry.  Trend troponin-stable.  BNP normal.  DC Toprol and change to Lopressor for now.  Prophylaxis Lovenox renal dose for now due to patient's fall risk.  Echocardiogram-ejection fraction 61 to 65%, diastolic dysfunction grade 1, tricuspid regurgitation with markedly elevated gradient 55 mmHg, normal right ventricle size and function, no significant valvular  pathology.    Right-sided pleural yuypwmxu-gciyqtdbe-fkmrfro thickening/interstitial fibrosis/COPD.  Patient is on chronic 2 L oxygen at home off-and-on.  Leukocytosis improving..  Maxipime antibiotics.  Vancomycin.  Consult CT surgeon-conservative management.  On 1 L of oxygen.    History lung cancer adenocarcinoma .  Patient follow with Dr. Michele outpatient.  Patient stated he underwent 2 years of chemotherapy.  Chest x-ray-Extensive chronic pulmonary fibrosis which appears stable-no definite superimposed acute infiltrate is identified, Chronic pleural thickening and/or small right pleural effusion- Stable satisfactory position of the right-sided port catheter.  On 1 L of oxygen.    Nausea/vomiting/umbilicus hernia/reflux.    Protonix.  Zofran as needed.  CT abdomen pelvic-Mild constipation- normal bowel gas pattern, Right-sided pleural effusion which appears loculated with associated pleural thickening-  pleural thickening is somewhat nodular in appearance and if the patient has a prior history of lung neoplasm could potentially represent pleural-based studding- some progression from the previous examination- effusion shows slight interval increase in size, interstitial fibrosis within both  lung bases, Mild aneurysmal dilatation of the ascending thoracic aorta- Heavy coronary calcifications are presen- no pericardial effusion, Cortical cysts of both kidneys- again 2 lesions within the left kidney which are more indeterminate but which are stable from the previous exam, s nonobstructing left-sided nephrolithiasis- No evidence of ureteral stone or obstructive uropathy- No free fluid or localized inflammatory process is demonstrated, Small bilateral fat-containing inguinal hernias, prostate gland is enlarged, Listhesis at the L4-L5 and L5-S1 level..     Acute on chronic renal failure stage III.  Previous creatinine 8/26/2021 2.21.  Creatinine is improving.  Slow lactated ringer.    Thrombocytopenia.   Platelet  counts improving. We will follow-up     Cortical cysts in both kidneys-stable from previous examination/nonobstructive left-sided nephrolithiasis.     Listhesis L4-L5 and L5-S1.     Insomnia.  Melatonin as needed.     Nutrition . regular/house diet.     Falling/deconditioning.  Patient lives alone.  PT and OT consult.  Patient usually get around with a cane sometimes.  Patient is a fall risk.     Blood culture-no growth in 24 hours.  COVID-19-negative.  Flu screen-negative.  Order MRSA screen, Legionella antigen, respiratory PCR, strep pneumo.  If MRSA is negative plan to DC vancomycin.    Medical Decision Making  Number and Complexity of problems: Atrial fibrillation started back again last night, on Cardizem drip, placed on blood pressure today.  Nausea vomiting improved, tolerating diet.  Loculated pleural effusion, conservative treatment per CT surgeon.    Differential Diagnosis: None    Conditions and Status   Stable     MDM Data  External documents reviewed: None.  Cardiac tracing (EKG, telemetry) interpretation: Atrial fibrillation, rate improving.  Radiology interpretation: CT scan  Labs reviewed: Labs, kidney function slight improvement.  Platelet count slight improvement  Any tests that were considered but not ordered: Lab in AM.     Discussed with: Patient and brother in his room     Care Planning  Shared decision making: Nursing staff and patient.  Code status and discussions: Full code.     Disposition  Social Determinants of Health that impact treatment or disposition: Patient does not want rehab, patient probably need to go home with home health place.  2 to 4 days.    Electronically signed by Zachary Lloyd MD, 01/07/23, 12:06 CST.

## 2023-01-08 LAB
ANION GAP SERPL CALCULATED.3IONS-SCNC: 9 MMOL/L (ref 5–15)
BUN SERPL-MCNC: 33 MG/DL (ref 8–23)
BUN/CREAT SERPL: 17.3 (ref 7–25)
CALCIUM SPEC-SCNC: 9.6 MG/DL (ref 8.6–10.5)
CHLORIDE SERPL-SCNC: 109 MMOL/L (ref 98–107)
CO2 SERPL-SCNC: 20 MMOL/L (ref 22–29)
CREAT SERPL-MCNC: 1.91 MG/DL (ref 0.76–1.27)
DEPRECATED RDW RBC AUTO: 50.6 FL (ref 37–54)
EGFRCR SERPLBLD CKD-EPI 2021: 35 ML/MIN/1.73
ERYTHROCYTE [DISTWIDTH] IN BLOOD BY AUTOMATED COUNT: 14.5 % (ref 12.3–15.4)
GLUCOSE SERPL-MCNC: 125 MG/DL (ref 65–99)
HCT VFR BLD AUTO: 40.9 % (ref 37.5–51)
HGB BLD-MCNC: 12.6 G/DL (ref 13–17.7)
MCH RBC QN AUTO: 29.2 PG (ref 26.6–33)
MCHC RBC AUTO-ENTMCNC: 30.8 G/DL (ref 31.5–35.7)
MCV RBC AUTO: 94.9 FL (ref 79–97)
PLATELET # BLD AUTO: 123 10*3/MM3 (ref 140–450)
PMV BLD AUTO: 12 FL (ref 6–12)
POTASSIUM SERPL-SCNC: 4.2 MMOL/L (ref 3.5–5.2)
RBC # BLD AUTO: 4.31 10*6/MM3 (ref 4.14–5.8)
SODIUM SERPL-SCNC: 138 MMOL/L (ref 136–145)
WBC NRBC COR # BLD: 8.66 10*3/MM3 (ref 3.4–10.8)

## 2023-01-08 PROCEDURE — 25010000002 CEFEPIME PER 500 MG: Performed by: INTERNAL MEDICINE

## 2023-01-08 PROCEDURE — 25010000002 ENOXAPARIN PER 10 MG: Performed by: INTERNAL MEDICINE

## 2023-01-08 PROCEDURE — 97116 GAIT TRAINING THERAPY: CPT

## 2023-01-08 PROCEDURE — 85027 COMPLETE CBC AUTOMATED: CPT | Performed by: FAMILY MEDICINE

## 2023-01-08 PROCEDURE — 80048 BASIC METABOLIC PNL TOTAL CA: CPT | Performed by: FAMILY MEDICINE

## 2023-01-08 RX ORDER — METOPROLOL TARTRATE 50 MG/1
50 TABLET, FILM COATED ORAL EVERY 12 HOURS SCHEDULED
Status: DISCONTINUED | OUTPATIENT
Start: 2023-01-08 | End: 2023-01-10 | Stop reason: HOSPADM

## 2023-01-08 RX ADMIN — CEFEPIME 2 G: 2 INJECTION, POWDER, FOR SOLUTION INTRAVENOUS at 17:58

## 2023-01-08 RX ADMIN — Medication 10 ML: at 09:46

## 2023-01-08 RX ADMIN — METOPROLOL TARTRATE 25 MG: 25 TABLET, FILM COATED ORAL at 14:22

## 2023-01-08 RX ADMIN — ENOXAPARIN SODIUM 30 MG: 30 INJECTION SUBCUTANEOUS at 09:45

## 2023-01-08 RX ADMIN — TAMSULOSIN HYDROCHLORIDE 0.4 MG: 0.4 CAPSULE ORAL at 20:59

## 2023-01-08 RX ADMIN — DILTIAZEM HYDROCHLORIDE 5 MG/HR: 5 INJECTION INTRAVENOUS at 05:46

## 2023-01-08 RX ADMIN — Medication 10 ML: at 20:59

## 2023-01-08 RX ADMIN — PANTOPRAZOLE SODIUM 40 MG: 40 TABLET, DELAYED RELEASE ORAL at 09:45

## 2023-01-08 RX ADMIN — METOPROLOL TARTRATE 50 MG: 50 TABLET, FILM COATED ORAL at 20:59

## 2023-01-08 RX ADMIN — METOPROLOL TARTRATE 25 MG: 25 TABLET, FILM COATED ORAL at 09:45

## 2023-01-08 NOTE — PLAN OF CARE
Goal Outcome Evaluation:  Plan of Care Reviewed With: patient        Progress: no change  Outcome Evaluation: Pt. agreeable to therapy with encouragement. He was CGA for bed mobility. During ambulation pt's O2 sat dropped to 84% while on 2l. Within a few minutes of rest, O2 sat increased to 90%, but it dropped again to 84% while on 3L after ambulating again. Pt. was able to walk 100' at a time with rests breaks. Pt. exhibited labored breathing with activity. Will continue to work with pt. while he is here.

## 2023-01-08 NOTE — THERAPY TREATMENT NOTE
Acute Care - Physical Therapy Treatment Note  [facility]     Patient Name: Karoline Prater  : 1940  MRN: [id number]  Today's Date: 2023      Visit Dx:     ICD-10-CM ICD-9-CM   1. NSTEMI (non-ST elevated myocardial infarction) (HCC)  I21.4 410.70   2. Chronic kidney disease, unspecified CKD stage  N18.9 585.9   3. Adenocarcinoma (HCC)  C80.1 199.1   4. Abdominal pain, unspecified abdominal location  R10.9 789.00   5. Impaired mobility  Z74.09 799.89     Patient Active Problem List   Diagnosis   • Dyspnea   • Essential hypertension   • NSVT (nonsustained ventricular tachycardia)   • Malignant neoplasm of upper lobe of right lung (HCC)   • Stage 3 chronic kidney disease (HCC)   • Adenocarcinoma, lung (HCC)   • Pancytopenia due to antineoplastic chemotherapy (HCC)   • Pneumonia due to infectious organism   • Function kidney decreased   • Cellulitis   • Acute respiratory failure with hypoxia (HCC)   • Fibrotic lung diseases (HCC)   • Macrocytic anemia   • Thrombocytopenia (HCC)   • Sepsis (HCC)   • Right pneumothorax   • Hyperkalemia   • Acute renal failure superimposed on chronic kidney disease (HCC)     Past Medical History:   Diagnosis Date   • Arthritis    • Atrial fibrillation with rapid ventricular response (HCC) 2019   • Blindness of left eye    • BPH with obstruction/lower urinary tract symptoms    • Cancer (HCC)     stomach & lung   • CHF (congestive heart failure) (HCC)    • CKD (chronic kidney disease) stage 3, GFR 30-59 ml/min (HCC)    • Coronary artery disease    • Elevated cholesterol    • Essential hypertension 10/2/2017   • Fibrotic lung diseases (HCC)    • GERD (gastroesophageal reflux disease)    • Hearing loss    • History of transfusion    • Hydronephrosis of left kidney    • Hyperlipidemia    • Hypertension     pt was taken off of all of his medications for BP (atenolol, lisinopril, lasix) because his BP kept bottoming out so his primary dr told him to discontinue them 1-2 months  ago (Jan/Feb 2019). pt states he takes no medications currently.   • Mass of duodenum versus letty hepatis  4/27/2019   • Mass of left renal hilum  4/27/2019   • Mass of upper lobe of right lung 02/2019    mass is shrinking on its own, so pt states Dr. Patel is just going to keep an eye on it and not do surgery right now.   • Mediastinal adenopathy 10/24/2018    [facility]   • Monoclonal gammopathy of unknown significance (MGUS) 9/11/2018   • Pancreatic mass     pt states he had this in 2013 but it went away on its own. Now recent CT shows it has come back so he is going to have an ultrasound on 3/13/19.   • Shortness of breath      Past Surgical History:   Procedure Laterality Date   • ABDOMINAL SURGERY     • BRONCHOSCOPY N/A 10/24/2018    Procedure: BRONCHOSCOPY WITH BIOPSY, EBUS;  Surgeon: Gareth Becerra MD;  Location: [facility] OR;  Service: Pulmonary   • CHOLECYSTECTOMY     • COLONOSCOPY N/A 1/3/2019    Procedure: COLONOSCOPY WITH ANESTHESIA;  Surgeon: Randy Somers DO;  Location: [facility] ENDOSCOPY;  Service: Gastroenterology   • CYSTOSCOPY, RETROGRADE PYELOGRAM AND STENT INSERTION Left 3/8/2019    Procedure: CYSTOSCOPY RETROGRADE BILATERAL PYELOGRAM;  Surgeon: Jos Sylvester MD;  Location: [facility] OR;  Service: Urology   • ENDOSCOPY N/A 12/11/2018    Procedure: ESOPHAGOGASTRODUODENOSCOPY WITH ANESTHESIA;  Surgeon: Randy Somers DO;  Location: [facility] ENDOSCOPY;  Service: Gastroenterology   • ENDOSCOPY N/A 4/29/2019    Procedure: ESOPHAGOGASTRODUODENOSCOPY WITH ANESTHESIA;  Surgeon: Lilliam Jj MD;  Location: [facility] OR;  Service: Gastroenterology   • ENDOSCOPY N/A 5/9/2019    Procedure: ESOPHAGOGASTRODUODENOSCOPY WITH ANESTHESIA;  Surgeon: Pilo Bansal MD;  Location: [facility] ENDOSCOPY;  Service: Gastroenterology   • EYE SURGERY Left 1964   • FOOT SURGERY Right 1966    joint   • FRACTURE SURGERY       PT Assessment (last 12 hours)     PT Evaluation and Treatment     Row Name 01/08/23 1130           Physical Therapy Time and Intention    Subjective Information complains of;dyspnea;fatigue  -     Document Type therapy note (daily note)  -     Mode of Treatment physical therapy  -     Row Name 01/08/23 1137          General Information    Existing Precautions/Restrictions fall;oxygen therapy device and L/min  -     Row Name 01/08/23 1137          Pain    Pretreatment Pain Rating 0/10 - no pain  -     Posttreatment Pain Rating 0/10 - no pain  -     Row Name 01/08/23 1137          Bed Mobility    Supine-Sit [place] (Bed Mobility) verbal cues;contact guard  -     Assistive Device (Bed Mobility) head of bed elevated;bed rails  -     Row Name 01/08/23 1137          Transfers    Comment, (Transfers) cues for hand placement  -     Row Name 01/08/23 1137          Sit-Stand Transfer    Sit-Stand [place] (Transfers) standby assist  -     Row Name 01/08/23 1137          Stand-Sit Transfer    Stand-Sit [place] (Transfers) standby assist  -     Row Name 01/08/23 1137          Gait/Stairs (Locomotion)    [place] Level (Gait) contact guard  -     Assistive Device (Gait) walker, front-wheeled  -     Distance in Feet (Gait) 100'  x 4 with 2 standing rests and 1 sitting rests  -     Deviations/Abnormal Patterns (Gait) stride length decreased;hailey decreased  -     Bilateral Gait Deviations forward flexed posture  -     Row Name 01/08/23 1137          Plan of Care Review    Plan of Care Reviewed With patient  -     Progress no change  -     Outcome Evaluation Pt. agreeable to therapy with encouragement. He was CGA for bed mobility. During ambulation pt's O2 sat dropped to 84% while on 2l. Within a few minutes of rest, O2 sat increased to 90%, but it dropped again to 84% while on 3L after ambulating again. Pt. was able to walk 100' at a time with rests breaks. Pt. exhibited labored breathing with activity. Will continue to work with pt. while he is here.  -     [place]  Name 01/08/23 1137          Vital Signs    Intratreatment Heart Rate (beats/min) 124  -MF     Pre SpO2 (%) 94  -MF     O2 Delivery Pre Treatment supplemental O2  2l  -MF     Intra SpO2 (%) 84  -MF     O2 Delivery Intra Treatment supplemental O2  2l, increased to 3L. O2 sat increased to 90%, but dropped again to 84% after walking again.  -     Post SpO2 (%) 84  increased to 90% slowly  -MF     O2 Delivery Post Treatment supplemental O2  3l  -MF     Pre Patient Position Sitting  -MF     Intra Patient Position Standing  -MF     Post Patient Position Sitting  -     Recovery Time 4-5 minutes  -MF     Row Name 01/08/23 1137          Positioning and Restraints    Pre-Treatment Position in bed  -MF     Post Treatment Position chair  -     In Chair reclined;call light within reach;encouraged to call for assist;notified nsg  -           User Key  (r) = Recorded By, (t) = Taken By, (c) = Cosigned By    Initials Name Provider Type     Lucrecia Mello, PTA Physical Therapist Assistant                Physical Therapy Education     Title: PT OT SLP Therapies (In Progress)     Topic: Physical Therapy (Done)     Point: Mobility training (Done)     Learning Progress Summary           Patient Acceptance, E,D, DU,VU by  at 1/6/2023 1158    Comment: benefits of PT and POC, call for A OOB                   Point: Precautions (Done)     Learning Progress Summary           Patient Acceptance, E,D, DU,VU by  at 1/6/2023 1158    Comment: benefits of PT and POC, call for A OOB                               User Key     Initials Effective Dates Name Provider Type Discipline     06/16/21 -  Martín Lu, PT Physical Therapist PT              PT Recommendation and Plan     Plan of Care Reviewed With: patient  Progress: no change  Outcome Evaluation: Pt. agreeable to therapy with encouragement. He was CGA for bed mobility. During ambulation pt's O2 sat dropped to 84% while on 2l. Within a few minutes of rest, O2 sat increased  to 90%, but it dropped again to 84% while on 3L after ambulating again. Pt. was able to walk 100' at a time with rests breaks. Pt. exhibited labored breathing with activity. Will continue to work with pt. while he is here.   Outcome Measures     Row Name 01/08/23 1137 01/07/23 1128          How much help from another person do you currently need...    Turning from your back to your side while in flat bed without using bedrails? 3  -MF 4  -MF     Moving from lying on back to sitting on the side of a flat bed without bedrails? 3  -MF 4  -MF     Moving to and from a bed to a chair (including a wheelchair)? 3  -MF 3  -MF     Standing up from a chair using your arms (e.g., wheelchair, bedside chair)? 3  -MF 3  -MF     Climbing 3-5 steps with a railing? 3  -MF 3  -MF     To walk in hospital room? 3  -MF 3  -MF     AM-PAC 6 Clicks Score (PT) 18  -MF 20  -MF        Functional Assessment    Outcome Measure Options AM-PAC 6 Clicks Basic Mobility (PT)  -MF AM-PAC 6 Clicks Basic Mobility (PT)  -MF           User Key  (r) = Recorded By, (t) = Taken By, (c) = Cosigned By    Initials Name Provider Type    Lucrecia Roblero PTA Physical Therapist Assistant                 Time Calculation:    PT Charges     Row Name 01/08/23 1327             Time Calculation    Start Time 1137  -MF      Stop Time 1202  -MF      Time Calculation (min) 25 min  -MF      PT Received On 01/08/23  -MF         Time Calculation- PT    Total Timed Code Minutes- PT 25 minute(s)  -MF         Timed Charges    18832 - Gait Training Minutes  25  -MF         Total Minutes    Timed Charges Total Minutes 25  -MF       Total Minutes 25  -MF            User Key  (r) = Recorded By, (t) = Taken By, (c) = Cosigned By    Initials Name Provider Type    Lucrecia Roblero PTA Physical Therapist Assistant              Therapy Charges for Today     Code Description Service Date Service Provider Modifiers Qty    66760805042 HC PT THER PROC EA 15 MIN 1/7/2023  Lucrecia Mello, PTA GP 1    55643746920 HC GAIT TRAINING EA 15 MIN 1/7/2023 Lucrecia Mello, SHIMA GP 1    58848066254 HC GAIT TRAINING EA 15 MIN 1/8/2023 Lucrecia Mello, SHIMA GP 2          PT G-Codes  Outcome Measure Options: AM-PAC 6 Clicks Basic Mobility (PT)  AM-PAC 6 Clicks Score (PT): 18  AM-PAC 6 Clicks Score (OT): 19    Lucrecia Mello PTA  1/8/2023

## 2023-01-08 NOTE — PLAN OF CARE
Goal Outcome Evaluation:           Progress: no change  Outcome Evaluation: No changes overnight. Cardizem gtt infusing at 5 ml/hr, HR remains afib 106-133. IV antibiotics given per order. VSS, safety maintained.

## 2023-01-08 NOTE — PROGRESS NOTES
"Pharmacy Dosing Service  Pharmacokinetics  Vancomycin Follow-up Evaluation    Assessment/Action/Plan:  Current Order: Vancomycin 750 mg IVPB every 24 hours  Current end date:1/15/2023  Levels: Vancomycin random level ordered for 1700 today  Additional antimicrobial agent(s): Cefepime  SCr = 1.91 (down from 2.05)  Vancomycin dose adjusted to 1000 mg iv q24h with next dose at 1800. Pharmacy will continue to follow daily and adjust dose accordingly.     Subjective:  Karoline Prater is a 82 y.o. male currently on Vancomycin or the treatment of sepsis, day 3 of treatment.    AUC Model Data:  Regimen: 1000 mg IV every 24 hours.  Start time: 18:00 on 01/08/2023  Exposure target: AUC24 (range)400-600 mg/L.hr   AUC24,ss: 596 mg/L.hr  PAUC*: 87 %  Ctrough,ss: 20 mg/L  Pconc*: 50 %  Tox.: 17 %      Objective:  Ht: 162.6 cm (64\"); Wt: 79 kg (174 lb 3.2 oz)  Estimated Creatinine Clearance: 29.3 mL/min (A) (by C-G formula based on SCr of 1.91 mg/dL (H)).   Creatinine   Date Value Ref Range Status   01/08/2023 1.91 (H) 0.76 - 1.27 mg/dL Final   01/07/2023 2.05 (H) 0.76 - 1.27 mg/dL Final   01/06/2023 2.37 (H) 0.76 - 1.27 mg/dL Final   12/17/2020 1.8 (H) 0.6 - 1.2 mg/dL Final   12/03/2020 1.5 (H) 0.6 - 1.2 mg/dL Final   10/30/2020 1.8 (H) 0.6 - 1.2 mg/dL Final      Lab Results   Component Value Date    WBC 8.66 01/08/2023    WBC 10.45 01/07/2023    WBC 14.69 (H) 01/06/2023         Lab Results   Component Value Date    VANCOTROUGH 10.05 05/31/2019       Culture Results:  Microbiology Results (last 10 days)       Procedure Component Value - Date/Time    MRSA Screen, PCR (Inpatient) - Swab, Nares [[id number]]  (Normal) Collected: 01/07/23 1339    Lab Status: Final result Specimen: Swab from Nares Updated: 01/07/23 1506     MRSA PCR No MRSA Detected    Narrative:      The negative predictive value of this diagnostic test is high and should only be used to consider de-escalating anti-MRSA therapy. A positive result may indicate " colonization with MRSA and must be correlated clinically.    S. Pneumo Ag Urine or CSF - Urine, Urine, Clean Catch [[id number]]  (Normal) Collected: 01/07/23 1338    Lab Status: Final result Specimen: Urine, Clean Catch Updated: 01/07/23 1414     Strep Pneumo Ag Negative    Legionella Antigen, Urine - Urine, Urine, Clean Catch [[id number]]  (Normal) Collected: 01/07/23 1338    Lab Status: Final result Specimen: Urine, Clean Catch Updated: 01/07/23 1414     LEGIONELLA ANTIGEN, URINE Negative    Respiratory Panel PCR w/COVID-19(SARS-CoV-2) JORDYN/MATILDE/BRYAN/PAD/COR/MAD/ASHER In-House, NP Swab in UTM/VTM, 3-4 HR TAT - Swab, Nasopharynx [[id number]]  (Normal) Collected: 01/07/23 1320    Lab Status: Final result Specimen: Swab from Nasopharynx Updated: 01/07/23 1417     ADENOVIRUS, PCR Not Detected     Coronavirus 229E Not Detected     Coronavirus HKU1 Not Detected     Coronavirus NL63 Not Detected     Coronavirus OC43 Not Detected     COVID19 Not Detected     Human Metapneumovirus Not Detected     Human Rhinovirus/Enterovirus Not Detected     Influenza A PCR Not Detected     Influenza B PCR Not Detected     Parainfluenza Virus 1 Not Detected     Parainfluenza Virus 2 Not Detected     Parainfluenza Virus 3 Not Detected     Parainfluenza Virus 4 Not Detected     RSV, PCR Not Detected     Bordetella pertussis pcr Not Detected     Bordetella parapertussis PCR Not Detected     Chlamydophila pneumoniae PCR Not Detected     Mycoplasma pneumo by PCR Not Detected    Narrative:      In the setting of a positive respiratory panel with a viral infection PLUS a negative procalcitonin without other underlying concern for bacterial infection, consider observing off antibiotics or discontinuation of antibiotics and continue supportive care. If the respiratory panel is positive for atypical bacterial infection (Bordetella pertussis, Chlamydophila pneumoniae, or Mycoplasma pneumoniae), consider antibiotic de-escalation to target atypical bacterial  infection.    COVID-19 and FLU A/B PCR - Swab, Nasopharynx [[id number]]  (Normal) Collected: 01/05/23 1436    Lab Status: Final result Specimen: Swab from Nasopharynx Updated: 01/05/23 1544     COVID19 Not Detected     Influenza A PCR Not Detected     Influenza B PCR Not Detected    Narrative:      Fact sheet for providers: https://www.fda.gov/media/433151/download    Fact sheet for patients: https://www.fda.gov/media/638738/download    Test performed by PCR.    Blood Culture - Blood, Arm, Left [[id number]]  (Normal) Collected: 01/05/23 1430    Lab Status: Preliminary result Specimen: Blood from Arm, Left Updated: 01/07/23 1516     Blood Culture No growth at 2 days    Blood Culture - Blood, Arm, Left [[id number]]  (Normal) Collected: 01/05/23 1430    Lab Status: Preliminary result Specimen: Blood from Arm, Left Updated: 01/07/23 1516     Blood Culture No growth at 2 days            Claudio Aldrich, PharmD   01/08/23 10:20 CST

## 2023-01-08 NOTE — PROGRESS NOTES
[facility] Medicine Services  INPATIENT PROGRESS NOTE    Patient Name: Karoline Prater  Date of Admission: 1/5/2023  Today's Date: 01/08/23  Length of Stay: 3  Primary Care Physician: Irving Alexis MD    Subjective   Chief Complaint: Loculated pleural effusion/hypoxia/weakness/atrial fibrillation.    HPI   Patient still in atrial fibrillation, Cardizem has been off since yesterday, his rates is much improved about 100 . discussed with patient putting on full dose anticoagulation however patient did not want that at this time due to his risk of falling.  Will increase Lopressor for now to see if he converted back to sinus rhythm.  Will stay on prophylaxis Lovenox renal dosing for now.  Discussed the patient possible do half dose Eliquis if patient stayed in atrial fibrillation due to his chronic renal failure and fall risk.  Patient clinically is having chest pain.  Patient is currently on 2 L.  Patient feeling better today than yesterday.  Patient has been ambulating in the hallway with a walker.    Review of Systems   Constitutional: Positive for activity change, appetite change and fatigue. Negative for chills and fever.   HENT: Negative for hearing loss, nosebleeds, tinnitus and trouble swallowing.    Eyes: Negative for visual disturbance.   Respiratory: Positive for shortness of breath. Negative for cough, chest tightness and wheezing.    Cardiovascular: Negative for chest pain, palpitations and leg swelling.   Gastrointestinal: Negative for abdominal distention, abdominal pain, blood in stool, constipation, diarrhea, nausea and vomiting.   Endocrine: Negative for cold intolerance, heat intolerance, polydipsia, polyphagia and polyuria.   Genitourinary: Negative for decreased urine volume, difficulty urinating, dysuria, flank pain, frequency and hematuria.   Musculoskeletal: Positive for arthralgias, gait problem and myalgias. Negative for joint swelling.   Skin:  Negative for rash.   Allergic/Immunologic: Negative for immunocompromised state.   Neurological: Positive for weakness. Negative for dizziness, syncope, light-headedness and headaches.   Hematological: Negative for adenopathy. Does not bruise/bleed easily.   Psychiatric/Behavioral: Negative for confusion and sleep disturbance. The patient is not nervous/anxious.    All pertinent negatives and positives are as above. All other systems have been reviewed and are negative unless otherwise stated.     Objective    Temp:  [97 °F (36.1 °C)-98.7 °F (37.1 °C)] 97 °F (36.1 °C)  Heart Rate:  [101-130] 117  Resp:  [18] 18  BP: ()/(63-89) 131/89  Physical Exam  Vitals and nursing note reviewed.   Constitutional:       Comments: Advanced age.  Chronically ill.   HENT:      Head: Normocephalic.   Eyes:      Conjunctiva/sclera: Conjunctivae normal.      Pupils: Pupils are equal, round, and reactive to light.   Neck:      Vascular: No JVD.   Cardiovascular:      Rate and Rhythm: Normal rate and regular rhythm.      Heart sounds: Normal heart sounds.   Pulmonary:      Effort: No respiratory distress.      Breath sounds: No wheezing or rales.      Comments: Diminished breath sound bilateral, clear, on 1 L of oxygen.  Chest:      Chest wall: No tenderness.   Abdominal:      General: Bowel sounds are normal. There is no distension.      Palpations: Abdomen is soft.      Tenderness: There is no abdominal tenderness.   Musculoskeletal:         General: No tenderness or deformity.      Cervical back: Neck supple.   Skin:     General: Skin is warm and dry.      Capillary Refill: Capillary refill takes 2 to 3 seconds.      Findings: No rash.   Neurological:      Mental Status: He is alert and oriented to person, place, and time.      Cranial Nerves: No cranial nerve deficit.      Motor: Weakness present. No abnormal muscle tone.      Coordination: Coordination abnormal.      Gait: Gait abnormal.      Deep Tendon Reflexes: Reflexes  normal.   Psychiatric:         Mood and Affect: Mood normal.         Behavior: Behavior normal.         Thought Content: Thought content normal.          Results Review:  I have reviewed the labs, radiology results, and diagnostic studies.    Laboratory Data:   Results from last 7 days   Lab Units 01/08/23  0303 01/07/23  0435 01/06/23  0620   WBC 10*3/mm3 8.66 10.45 14.69*   HEMOGLOBIN g/dL 12.6* 12.6* 12.4*   HEMATOCRIT % 40.9 40.7 39.0   PLATELETS 10*3/mm3 123* 122* 114*        Results from last 7 days   Lab Units 01/08/23  0303 01/07/23  0435 01/06/23  0620 01/05/23  1505 01/05/23  1430   SODIUM mmol/L 138 138 131*  --  135*   SODIUM, ARTERIAL   --   --   --    < >  --    POTASSIUM mmol/L 4.2 4.1 4.4  --  4.3   CHLORIDE mmol/L 109* 106 103  --  102   CO2 mmol/L 20.0* 21.0* 21.0*  --  22.0   BUN mg/dL 33* 39* 49*  --  50*   CREATININE mg/dL 1.91* 2.05* 2.37*  --  2.63*   CALCIUM mg/dL 9.6 9.9 9.2  --  9.0   BILIRUBIN mg/dL  --  0.5 0.6  --  0.6   ALK PHOS U/L  --  75 78  --  80   ALT (SGPT) U/L  --  14 13  --  15   AST (SGOT) U/L  --  27 24  --  20   GLUCOSE mg/dL 125* 114* 89  --  184*    < > = values in this interval not displayed.       Culture Data:   Blood Culture   Date Value Ref Range Status   01/05/2023 No growth at 2 days  Preliminary   01/05/2023 No growth at 2 days  Preliminary       Radiology Data:   Imaging Results (Last 24 Hours)     ** No results found for the last 24 hours. **          I have reviewed the patient's current medications.     Assessment/Plan   Assessment  Active Hospital Problems    Diagnosis    • **Adenocarcinoma, lung (HCC)    • Fibrotic lung diseases (HCC)    • Stage 3 chronic kidney disease (HCC)        Treatment Plan  Atrial fibrillation/hypertension/CAD.     Patient's still in atrial fibrillation, rates in the .  Cardizem drip was DC'd yesterday..  Telemetry.  Trend troponin-stable.  BNP normal.   Increase Lopressor.  Prophylaxis Lovenox renal dose for now due to  patient's fall risk.  Patient stated he did not want to to be on full dose blood thinner due to his fall risk.  Discussed with patient if he remained in atrial fib with increase in Lopressor possible do the least half dose Eliquis, blood thinner, due to renal failure and advanced age.  Echocardiogram-ejection fraction 61 to 65%, diastolic dysfunction grade 1, tricuspid regurgitation with markedly elevated gradient 55 mmHg, normal right ventricle size and function, no significant valvular pathology.     Right-sided pleural hfjiyvce-twptvhyeh-junjxyp thickening/interstitial fibrosis/COPD.  Patient is on chronic 2 L oxygen at home off-and-on.  Leukocytosis resolved..  Maxipime antibiotics possible switch to Augmentin at discharge.  Vancomycin DC 1/8/2023 due to negative blood culture and negative MRSA screen.  Consult CT surgeon-conservative management.  On 1 L of oxygen.     History lung cancer adenocarcinoma .  Patient follow with Dr. Michele outpatient.  Patient stated he underwent 2 years of chemotherapy.  Chest x-ray-Extensive chronic pulmonary fibrosis which appears stable-no definite superimposed acute infiltrate is identified, Chronic pleural thickening and/or small right pleural effusion- Stable satisfactory position of the right-sided port catheter.    Nausea/vomiting/umbilicus hernia/reflux.    Protonix.  Zofran as needed.  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     Acute on chronic renal failure stage III.  Previous creatinine 8/26/2021 2.21.  Creatinine is improving.  Slow lactated ringer.     Thrombocytopenia.   Platelet counts improving. We will follow-up     Cortical cysts in both kidneys-stable from previous examination/nonobstructive left-sided nephrolithiasis.     Listhesis L4-L5 and L5-S1.     Insomnia.  Melatonin as needed.     Nutrition . regular/house diet.     Falling/deconditioning.  Patient lives alone.  PT and OT consult.  Patient usually get around with a cane sometimes.  Patient is a fall risk.     Blood culture-no growth in 24 hours.  COVID-19-negative.  Flu screen-negative.  Order MRSA screen, Legionella antigen, respiratory PCR, strep pneumo.  If MRSA is negative plan to DC vancomycin.     Medical Decision Making  Number and Complexity of problems: Atrial fibrillation started back again last night, on Cardizem drip, placed on blood pressure today.  Nausea vomiting improved, tolerating diet.  Loculated pleural effusion, conservative treatment per CT surgeon.     Differential Diagnosis: None     Conditions and Status   Stable     MDM Data  External documents reviewed: None.  Cardiac tracing (EKG, telemetry) interpretation: Atrial fibrillation, rate improving.  Radiology interpretation: CT scan  Labs reviewed: Labs, kidney function slight improvement.  Platelet count slight improvement  Any tests that were considered but not ordered: Lab in AM.     Discussed with: Patient and brother in his room     Care Planning  Shared decision making: Nursing  staff and patient.  Code status and discussions: Full code.      Disposition  Social Determinants of Health that impact treatment or disposition: Patient does not want rehab, patient probably need to go home with home health place.  2 to 4 days.  Electronically signed by Zachary Lloyd MD, 01/08/23, 14:11 CST.

## 2023-01-09 LAB
ANION GAP SERPL CALCULATED.3IONS-SCNC: 10 MMOL/L (ref 5–15)
BUN SERPL-MCNC: 32 MG/DL (ref 8–23)
BUN/CREAT SERPL: 18 (ref 7–25)
CALCIUM SPEC-SCNC: 9.8 MG/DL (ref 8.6–10.5)
CHLORIDE SERPL-SCNC: 106 MMOL/L (ref 98–107)
CO2 SERPL-SCNC: 21 MMOL/L (ref 22–29)
CREAT SERPL-MCNC: 1.78 MG/DL (ref 0.76–1.27)
DEPRECATED RDW RBC AUTO: 51.6 FL (ref 37–54)
EGFRCR SERPLBLD CKD-EPI 2021: 38.1 ML/MIN/1.73
ERYTHROCYTE [DISTWIDTH] IN BLOOD BY AUTOMATED COUNT: 14.4 % (ref 12.3–15.4)
GLUCOSE SERPL-MCNC: 91 MG/DL (ref 65–99)
HCT VFR BLD AUTO: 40.4 % (ref 37.5–51)
HGB BLD-MCNC: 12.2 G/DL (ref 13–17.7)
MCH RBC QN AUTO: 29.2 PG (ref 26.6–33)
MCHC RBC AUTO-ENTMCNC: 30.2 G/DL (ref 31.5–35.7)
MCV RBC AUTO: 96.7 FL (ref 79–97)
PLATELET # BLD AUTO: 133 10*3/MM3 (ref 140–450)
PMV BLD AUTO: 12.1 FL (ref 6–12)
POTASSIUM SERPL-SCNC: 4.1 MMOL/L (ref 3.5–5.2)
QT INTERVAL: 256 MS
QT INTERVAL: 334 MS
QTC INTERVAL: 413 MS
QTC INTERVAL: 418 MS
RBC # BLD AUTO: 4.18 10*6/MM3 (ref 4.14–5.8)
SODIUM SERPL-SCNC: 137 MMOL/L (ref 136–145)
WBC NRBC COR # BLD: 7.57 10*3/MM3 (ref 3.4–10.8)

## 2023-01-09 PROCEDURE — 97530 THERAPEUTIC ACTIVITIES: CPT

## 2023-01-09 PROCEDURE — 80048 BASIC METABOLIC PNL TOTAL CA: CPT | Performed by: FAMILY MEDICINE

## 2023-01-09 PROCEDURE — 97110 THERAPEUTIC EXERCISES: CPT

## 2023-01-09 PROCEDURE — 85027 COMPLETE CBC AUTOMATED: CPT | Performed by: FAMILY MEDICINE

## 2023-01-09 PROCEDURE — 97116 GAIT TRAINING THERAPY: CPT

## 2023-01-09 PROCEDURE — 25010000002 ENOXAPARIN PER 10 MG: Performed by: INTERNAL MEDICINE

## 2023-01-09 RX ADMIN — METOPROLOL TARTRATE 50 MG: 50 TABLET, FILM COATED ORAL at 09:07

## 2023-01-09 RX ADMIN — PANTOPRAZOLE SODIUM 40 MG: 40 TABLET, DELAYED RELEASE ORAL at 09:07

## 2023-01-09 RX ADMIN — GLYCERIN 1 DROP: .002; .002; .01 SOLUTION/ DROPS OPHTHALMIC at 21:42

## 2023-01-09 RX ADMIN — METOPROLOL TARTRATE 50 MG: 50 TABLET, FILM COATED ORAL at 21:42

## 2023-01-09 RX ADMIN — TAMSULOSIN HYDROCHLORIDE 0.4 MG: 0.4 CAPSULE ORAL at 21:42

## 2023-01-09 RX ADMIN — Medication 10 ML: at 21:42

## 2023-01-09 RX ADMIN — Medication 6 MG: at 00:59

## 2023-01-09 RX ADMIN — ENOXAPARIN SODIUM 30 MG: 30 INJECTION SUBCUTANEOUS at 09:07

## 2023-01-09 RX ADMIN — ACETAMINOPHEN 650 MG: 325 TABLET, FILM COATED ORAL at 00:59

## 2023-01-09 RX ADMIN — Medication 10 ML: at 09:08

## 2023-01-09 NOTE — PLAN OF CARE
Goal Outcome Evaluation:  Plan of Care Reviewed With: patient        Progress: improving  Outcome Evaluation: Pt performed BUE ther ex focusing on GM movement and pulmonary function. Pt able to perform x5 reps x2 sets with deep breathing. Required cues first set for sequencing of breath with ther ex. Pt demo's O2 85-90% with RA, requires RBs and cues for PLB. Sit<>stands SBA and bed mobility SBA. Pt would benefit from HH and assist at home. Educated and discussed home safety and seated ADLs.

## 2023-01-09 NOTE — THERAPY TREATMENT NOTE
Patient Name: Karoline Prater  : 1940    MRN: [id number]                              Today's Date: 2023       Admit Date: 2023    Visit Dx: Therapist utilized gait belt, applied non-slipped socks, provided fall risk education/prevention, & facilitated muscle strengthening PRN to reduce patient falls risk during this session.      ICD-10-CM ICD-9-CM   1. NSTEMI (non-ST elevated myocardial infarction) (HCC)  I21.4 410.70   2. Chronic kidney disease, unspecified CKD stage  N18.9 585.9   3. Adenocarcinoma (HCC)  C80.1 199.1   4. Abdominal pain, unspecified abdominal location  R10.9 789.00   5. Impaired mobility  Z74.09 799.89     Patient Active Problem List   Diagnosis   • Dyspnea   • Essential hypertension   • NSVT (nonsustained ventricular tachycardia)   • Malignant neoplasm of upper lobe of right lung (HCC)   • Stage 3 chronic kidney disease (HCC)   • Adenocarcinoma, lung (HCC)   • Pancytopenia due to antineoplastic chemotherapy (HCC)   • Pneumonia due to infectious organism   • Function kidney decreased   • Cellulitis   • Acute respiratory failure with hypoxia (HCC)   • Fibrotic lung diseases (HCC)   • Macrocytic anemia   • Thrombocytopenia (HCC)   • Sepsis (HCC)   • Right pneumothorax   • Hyperkalemia   • Acute renal failure superimposed on chronic kidney disease (HCC)     Past Medical History:   Diagnosis Date   • Arthritis    • Atrial fibrillation with rapid ventricular response (HCC) 2019   • Blindness of left eye    • BPH with obstruction/lower urinary tract symptoms    • Cancer (HCC)     stomach & lung   • CHF (congestive heart failure) (HCC)    • CKD (chronic kidney disease) stage 3, GFR 30-59 ml/min (HCC)    • Coronary artery disease    • Elevated cholesterol    • Essential hypertension 10/2/2017   • Fibrotic lung diseases (HCC)    • GERD (gastroesophageal reflux disease)    • Hearing loss    • History of transfusion    • Hydronephrosis of left kidney    • Hyperlipidemia    •  Hypertension     pt was taken off of all of his medications for BP (atenolol, lisinopril, lasix) because his BP kept bottoming out so his primary dr told him to discontinue them 1-2 months ago (Jan/Feb 2019). pt states he takes no medications currently.   • Mass of duodenum versus letty hepatis  4/27/2019   • Mass of left renal hilum  4/27/2019   • Mass of upper lobe of right lung 02/2019    mass is shrinking on its own, so pt states Dr. Patel is just going to keep an eye on it and not do surgery right now.   • Mediastinal adenopathy 10/24/2018    [facility]   • Monoclonal gammopathy of unknown significance (MGUS) 9/11/2018   • Pancreatic mass     pt states he had this in 2013 but it went away on its own. Now recent CT shows it has come back so he is going to have an ultrasound on 3/13/19.   • Shortness of breath      Past Surgical History:   Procedure Laterality Date   • ABDOMINAL SURGERY     • BRONCHOSCOPY N/A 10/24/2018    Procedure: BRONCHOSCOPY WITH BIOPSY, EBUS;  Surgeon: Gareth Becerra MD;  Location: [facility] OR;  Service: Pulmonary   • CHOLECYSTECTOMY     • COLONOSCOPY N/A 1/3/2019    Procedure: COLONOSCOPY WITH ANESTHESIA;  Surgeon: Randy Somers DO;  Location: [facility] ENDOSCOPY;  Service: Gastroenterology   • CYSTOSCOPY, RETROGRADE PYELOGRAM AND STENT INSERTION Left 3/8/2019    Procedure: CYSTOSCOPY RETROGRADE BILATERAL PYELOGRAM;  Surgeon: Jos Sylvester MD;  Location: [facility] OR;  Service: Urology   • ENDOSCOPY N/A 12/11/2018    Procedure: ESOPHAGOGASTRODUODENOSCOPY WITH ANESTHESIA;  Surgeon: Randy Somers DO;  Location: [facility] ENDOSCOPY;  Service: Gastroenterology   • ENDOSCOPY N/A 4/29/2019    Procedure: ESOPHAGOGASTRODUODENOSCOPY WITH ANESTHESIA;  Surgeon: Lilliam Jj MD;  Location: [facility] OR;  Service: Gastroenterology   • ENDOSCOPY N/A 5/9/2019    Procedure: ESOPHAGOGASTRODUODENOSCOPY WITH ANESTHESIA;  Surgeon: Pilo Bansal MD;  Location: [facility] ENDOSCOPY;  Service:  Gastroenterology   • EYE SURGERY Left 1964   • FOOT SURGERY Right 1966    joint   • FRACTURE SURGERY        General Information     Row Name 01/09/23 1530          OT Time and Intention    Document Type therapy note (daily note)  -MT     Mode of Treatment occupational therapy  -MT     Row Name 01/09/23 1530          General Information    Patient Profile Reviewed yes  -MT     Existing Precautions/Restrictions fall;oxygen therapy device and L/min  -MT     Row Name 01/09/23 1530          Cognition    Orientation Status (Cognition) oriented x 4  -MT     Row Name 01/09/23 1530          Safety Issues, Functional Mobility    Impairments Affecting Function (Mobility) balance;endurance/activity tolerance;shortness of breath;strength  -MT           User Key  (r) = Recorded By, (t) = Taken By, (c) = Cosigned By    Initials Name Provider Type    Luba Braden COTA Occupational Therapist Assistant                 Mobility/ADL's     Row Name 01/09/23 1530          Bed Mobility    Scooting/Bridging [place] (Bed Mobility) standby assist  -MT     Supine-Sit [place] (Bed Mobility) standby assist  -MT     Sit-Supine [place] (Bed Mobility) standby assist  -MT     Assistive Device (Bed Mobility) head of bed elevated;bed rails  -MT     Row Name 01/09/23 1530          Transfers    Transfers stand-sit transfer;toilet transfer;sit-stand transfer  -MT     Row Name 01/09/23 1530          Sit-Stand Transfer    Sit-Stand [place] (Transfers) standby assist  -MT     Row Name 01/09/23 1530          Stand-Sit Transfer    Stand-Sit [place] (Transfers) standby assist  -MT           User Key  (r) = Recorded By, (t) = Taken By, (c) = Cosigned By    Initials Name Provider Type    Luba Braden COTA Occupational Therapist Assistant               Obj/Interventions     Row Name 01/09/23 1530          Motor Skills    Therapeutic Exercise aerobic  -MT     Row Name 01/09/23 1530          Aerobic Exercise    Comment,  Aerobic Exercise (Therapeutic Exercise) Pt performed BUE ther ex focusing on GM movement and pulmonary function. Pt able to perform x5 reps x2 sets with deep breathing. Required cues first set for sequencing of breath with ther ex. Pt demo's O2 85-90% with RA, requires RBs and cues for PLB  -MT           User Key  (r) = Recorded By, (t) = Taken By, (c) = Cosigned By    Initials Name Provider Type    Luba Braden COTA Occupational Therapist Assistant               Goals/Plan    No documentation.                Clinical Impression     Row Name 01/09/23 1530          Pain Assessment    Pretreatment Pain Rating 0/10 - no pain  -MT     Posttreatment Pain Rating 0/10 - no pain  -MT     Row Name 01/09/23 1530          Plan of Care Review    Plan of Care Reviewed With patient  -MT     Progress improving  -MT     Row Name 01/09/23 1530          Therapy Plan Review/Discharge Plan (OT)    Anticipated Discharge Disposition (OT) home with assist;home with home health  -MT     Row Name 01/09/23 1530          Vital Signs    O2 Delivery Pre Treatment room air  -MT     Row Name 01/09/23 1530          Positioning and Restraints    Pre-Treatment Position in bed  -MT     Post Treatment Position bed  -MT     In Bed fowlers;call light within reach;encouraged to call for assist;side rails up x2  -MT           User Key  (r) = Recorded By, (t) = Taken By, (c) = Cosigned By    Initials Name Provider Type    Luba Braden COTA Occupational Therapist Assistant               Outcome Measures     Row Name 01/09/23 1530          How much help from another is currently needed...    Putting on and taking off regular lower body clothing? 3  -MT     Bathing (including washing, rinsing, and drying) 3  -MT     Toileting (which includes using toilet bed pan or urinal) 3  -MT     Putting on and taking off regular upper body clothing 3  -MT     Taking care of personal grooming (such as brushing teeth) 4  -MT     Row Name 01/09/23 1032           How much help from another person do you currently need...    Turning from your back to your side while in flat bed without using bedrails? 4  -TB     Moving from lying on back to sitting on the side of a flat bed without bedrails? 4  -TB     Moving to and from a bed to a chair (including a wheelchair)? 4  -TB     Standing up from a chair using your arms (e.g., wheelchair, bedside chair)? 4  -TB     Climbing 3-5 steps with a railing? 3  -TB     To walk in hospital room? 3  -TB     AM-PAC 6 Clicks Score (PT) 22  -TB     Highest level of mobility 7 --> Walked 25 feet or more  -TB     Row Name 01/09/23 1032          Functional Assessment    Outcome Measure Options AM-PAC 6 Clicks Basic Mobility (PT)  -TB           User Key  (r) = Recorded By, (t) = Taken By, (c) = Cosigned By    Initials Name Provider Type    TB Shital Bragg, PTA Physical Therapist Assistant    MT Luba Chapman COTA Occupational Therapist Assistant                Occupational Therapy Education     Title: PT OT SLP Therapies (In Progress)     Topic: Occupational Therapy (In Progress)     Point: ADL training (Done)     Description:   Instruct learner(s) on proper safety adaptation and remediation techniques during self care or transfers.   Instruct in proper use of assistive devices.              Learning Progress Summary           Patient Acceptance, E,TB, VU by MT at 1/9/2023 1613    Comment: seated shower task, O2 use in shower, deep breathing techs, need for pulse ox to monitor O2 at home    Acceptance, E,D, VU,NR by  at 1/6/2023 1253                   Point: Home exercise program (Not Started)     Description:   Instruct learner(s) on appropriate technique for monitoring, assisting and/or progressing therapeutic exercises/activities.              Learner Progress:  Not documented in this visit.          Point: Precautions (Not Started)     Description:   Instruct learner(s) on prescribed precautions during self-care and functional  transfers.              Learner Progress:  Not documented in this visit.          Point: Body mechanics (Not Started)     Description:   Instruct learner(s) on proper positioning and spine alignment during self-care, functional mobility activities and/or exercises.              Learner Progress:  Not documented in this visit.                      User Key     Initials Effective Dates Name Provider Type Discipline     06/16/21 -  Clarissa Valverde, OTR/L Occupational Therapist OT    MT 06/16/21 -  Luba Chapman COTA Occupational Therapist Assistant OT              OT Recommendation and Plan     Plan of Care Review  Plan of Care Reviewed With: patient  Progress: improving  Outcome Evaluation: Pt performed BUE ther ex focusing on GM movement and pulmonary function. Pt able to perform x5 reps x2 sets with deep breathing. Required cues first set for sequencing of breath with ther ex. Pt demo's O2 85-90% with RA, requires RBs and cues for PLB. Sit<>stands SBA and bed mobility SBA. Pt would benefit from HH and assist at home. Educated and discussed home safety and seated ADLs.     Time Calculation:    Time Calculation- OT     Row Name 01/09/23 1530             Time Calculation- OT    OT Start Time 1530  -MT      OT Stop Time 1558  -MT      OT Time Calculation (min) 28 min  -MT      Total Timed Code Minutes- OT 28 minute(s)  -MT      OT Received On 01/09/23  -MT         Timed Charges    85981 - OT Therapeutic Exercise Minutes 28  -MT         Total Minutes    Timed Charges Total Minutes 28  -MT       Total Minutes 28  -MT            User Key  (r) = Recorded By, (t) = Taken By, (c) = Cosigned By    Initials Name Provider Type    MT Luba Chapman COTA Occupational Therapist Assistant              Therapy Charges for Today     Code Description Service Date Service Provider Modifiers Qty    [id number]  OT THER PROC EA 15 MIN 1/9/2023 Luba Chapman COTA GO 2               VIKRAM Ascencio  1/9/2023

## 2023-01-09 NOTE — CASE MANAGEMENT/SOCIAL WORK
Continued Stay Note   [place]     Patient Name: Karoline Prater  MRN: [id number]  Today's Date: 1/9/2023    Admit Date: 1/5/2023    Plan: Home   Discharge Plan     Row Name 01/09/23 1350       Plan    Plan Home    Final Discharge Disposition Code 01 - home or self-care    Final Note CM spoke to patient to confirm discharge plan. Patient says he does not want SNF and plans to dc home. Patient says his brother and other family members are involved. Patient does not want home health.    Row Name 01/09/23 1026       Plan    Plan Home    Plan Comments Anticipate discharge home soon. Patient has been able to ambulate 400 ft with therapy and does not want home health care.    Final Discharge Disposition Code 01 - home or self-care                       Expected Discharge Date and Time     Expected Discharge Date Expected Discharge Time    Jan 11, 2023             HARRY Rizvi

## 2023-01-09 NOTE — PLAN OF CARE
Goal Outcome Evaluation:           Progress: no change  Outcome Evaluation: Patient with no c/o pain overnight. O2 down to his home level of 1 L prn. States he feels much better and is breathing easier. VSS, safety maintained. S 77-95 on tele.

## 2023-01-09 NOTE — THERAPY TREATMENT NOTE
Acute Care - Physical Therapy Treatment Note  [facility]     Patient Name: Karoline Prater  : 1940  MRN: [id number]  Today's Date: 2023      Visit Dx:     ICD-10-CM ICD-9-CM   1. NSTEMI (non-ST elevated myocardial infarction) (HCC)  I21.4 410.70   2. Chronic kidney disease, unspecified CKD stage  N18.9 585.9   3. Adenocarcinoma (HCC)  C80.1 199.1   4. Abdominal pain, unspecified abdominal location  R10.9 789.00   5. Impaired mobility  Z74.09 799.89     Patient Active Problem List   Diagnosis   • Dyspnea   • Essential hypertension   • NSVT (nonsustained ventricular tachycardia)   • Malignant neoplasm of upper lobe of right lung (HCC)   • Stage 3 chronic kidney disease (HCC)   • Adenocarcinoma, lung (HCC)   • Pancytopenia due to antineoplastic chemotherapy (HCC)   • Pneumonia due to infectious organism   • Function kidney decreased   • Cellulitis   • Acute respiratory failure with hypoxia (HCC)   • Fibrotic lung diseases (HCC)   • Macrocytic anemia   • Thrombocytopenia (HCC)   • Sepsis (HCC)   • Right pneumothorax   • Hyperkalemia   • Acute renal failure superimposed on chronic kidney disease (HCC)     Past Medical History:   Diagnosis Date   • Arthritis    • Atrial fibrillation with rapid ventricular response (HCC) 2019   • Blindness of left eye    • BPH with obstruction/lower urinary tract symptoms    • Cancer (HCC)     stomach & lung   • CHF (congestive heart failure) (HCC)    • CKD (chronic kidney disease) stage 3, GFR 30-59 ml/min (HCC)    • Coronary artery disease    • Elevated cholesterol    • Essential hypertension 10/2/2017   • Fibrotic lung diseases (HCC)    • GERD (gastroesophageal reflux disease)    • Hearing loss    • History of transfusion    • Hydronephrosis of left kidney    • Hyperlipidemia    • Hypertension     pt was taken off of all of his medications for BP (atenolol, lisinopril, lasix) because his BP kept bottoming out so his primary dr told him to discontinue them 1-2 months  ago (Jan/Feb 2019). pt states he takes no medications currently.   • Mass of duodenum versus letty hepatis  4/27/2019   • Mass of left renal hilum  4/27/2019   • Mass of upper lobe of right lung 02/2019    mass is shrinking on its own, so pt states Dr. Patel is just going to keep an eye on it and not do surgery right now.   • Mediastinal adenopathy 10/24/2018    [facility]   • Monoclonal gammopathy of unknown significance (MGUS) 9/11/2018   • Pancreatic mass     pt states he had this in 2013 but it went away on its own. Now recent CT shows it has come back so he is going to have an ultrasound on 3/13/19.   • Shortness of breath      Past Surgical History:   Procedure Laterality Date   • ABDOMINAL SURGERY     • BRONCHOSCOPY N/A 10/24/2018    Procedure: BRONCHOSCOPY WITH BIOPSY, EBUS;  Surgeon: Gareth Becerra MD;  Location: [facility] OR;  Service: Pulmonary   • CHOLECYSTECTOMY     • COLONOSCOPY N/A 1/3/2019    Procedure: COLONOSCOPY WITH ANESTHESIA;  Surgeon: Randy Somers DO;  Location: [facility] ENDOSCOPY;  Service: Gastroenterology   • CYSTOSCOPY, RETROGRADE PYELOGRAM AND STENT INSERTION Left 3/8/2019    Procedure: CYSTOSCOPY RETROGRADE BILATERAL PYELOGRAM;  Surgeon: Jos Sylvester MD;  Location: [facility] OR;  Service: Urology   • ENDOSCOPY N/A 12/11/2018    Procedure: ESOPHAGOGASTRODUODENOSCOPY WITH ANESTHESIA;  Surgeon: Randy Somers DO;  Location: [facility] ENDOSCOPY;  Service: Gastroenterology   • ENDOSCOPY N/A 4/29/2019    Procedure: ESOPHAGOGASTRODUODENOSCOPY WITH ANESTHESIA;  Surgeon: Lilliam Jj MD;  Location: [facility] OR;  Service: Gastroenterology   • ENDOSCOPY N/A 5/9/2019    Procedure: ESOPHAGOGASTRODUODENOSCOPY WITH ANESTHESIA;  Surgeon: Pilo Bansal MD;  Location: [facility] ENDOSCOPY;  Service: Gastroenterology   • EYE SURGERY Left 1964   • FOOT SURGERY Right 1966    joint   • FRACTURE SURGERY       PT Assessment (last 12 hours)     PT Evaluation and Treatment     Row Name 01/09/23 1001           Physical Therapy Time and Intention    Subjective Information no complaints  -TB     Document Type therapy note (daily note)  -TB     Mode of Treatment physical therapy  -TB     Patient Effort good  -TB     Comment RA at rest and 2-3 for activity  -TB     Row Name 01/09/23 1008          General Information    Existing Precautions/Restrictions fall;oxygen therapy device and L/min  -TB     Row Name 01/09/23 1008          Pain    Pretreatment Pain Rating 0/10 - no pain  -TB     Posttreatment Pain Rating 0/10 - no pain  -TB     Row Name 01/09/23 1008          Bed Mobility    Bed Mobility scooting/bridging;supine-sit;sit-supine  -TB     Scooting/Bridging [place] (Bed Mobility) standby assist  -TB     Supine-Sit [place] (Bed Mobility) standby assist  -TB     Sit-Supine [place] (Bed Mobility) standby assist  -TB     Assistive Device (Bed Mobility) head of bed elevated;bed rails  -TB     Row Name 01/09/23 1008          Transfers    Transfers sit-stand transfer;stand-sit transfer  -TB     Row Name 01/09/23 1008          Sit-Stand Transfer    Sit-Stand [place] (Transfers) standby assist  -TB     Assistive Device (Sit-Stand Transfers) walker, front-wheeled  -TB     Row Name 01/09/23 1008          Stand-Sit Transfer    Stand-Sit [place] (Transfers) standby assist  -TB     Assistive Device (Stand-Sit Transfers) walker, front-wheeled  -TB     Row Name 01/09/23 1008          Gait/Stairs (Locomotion)    [place] Level (Gait) contact guard;standby assist  -TB     Assistive Device (Gait) walker, front-wheeled  -TB     Distance in Feet (Gait) 150'x2  1 seated rest break  -TB     Deviations/Abnormal Patterns (Gait) stride length decreased;hailey decreased  -TB     Bilateral Gait Deviations forward flexed posture  -TB     Row Name 01/09/23 1008          Plan of Care Review    Plan of Care Reviewed With patient  -TB     Progress improving  -TB     Outcome Evaluation Pt in bed agreeable to therapy. Pt  on RA w/ sat 92%. Bed mob SBA to EOB. Performed AROM BLE x15. Placed pt on 2L for activity. Stood SBA. AMb 150'x2 w/ RW and SBA. Seated rest required. Pt had increased SOA on the way back so increased O2 to 3L. Once seated in room O2 read 87%. Pt recovered to 95% within a min. Took pt off O2 and he stayed between 90-92%. Will cont POC.  -TB     Row Name 01/09/23 1008          Positioning and Restraints    Pre-Treatment Position in bed  -TB     Post Treatment Position bed  -TB     In Bed fowlers;call light within reach;encouraged to call for assist;side rails up x2  -TB           User Key  (r) = Recorded By, (t) = Taken By, (c) = Cosigned By    Initials Name Provider Type    TB Shital Bragg, PTA Physical Therapist Assistant                Physical Therapy Education     Title: PT OT SLP Therapies (In Progress)     Topic: Physical Therapy (Done)     Point: Mobility training (Done)     Learning Progress Summary           Patient Acceptance, E,D, DU,VU by  at 1/6/2023 1158    Comment: benefits of PT and POC, call for A OOB                   Point: Precautions (Done)     Learning Progress Summary           Patient Acceptance, E,D, DU,VU by  at 1/6/2023 1158    Comment: benefits of PT and POC, call for A OOB                               User Key     Initials Effective Dates Name Provider Type Discipline     06/16/21 -  Martín Lu, PT Physical Therapist PT              PT Recommendation and Plan     Plan of Care Reviewed With: patient  Progress: improving  Outcome Evaluation: Pt in bed agreeable to therapy. Pt on RA w/ sat 92%. Bed mob SBA to EOB. Performed AROM BLE x15. Placed pt on 2L for activity. Stood SBA. AMb 150'x2 w/ RW and SBA. Seated rest required. Pt had increased SOA on the way back so increased O2 to 3L. Once seated in room O2 read 87%. Pt recovered to 95% within a min. Took pt off O2 and he stayed between 90-92%. Will cont POC.   Outcome Measures     Row Name 01/09/23 1032 01/08/23 1137  01/07/23 1128       How much help from another person do you currently need...    Turning from your back to your side while in flat bed without using bedrails? 4  -TB 3  -MF 4  -MF    Moving from lying on back to sitting on the side of a flat bed without bedrails? 4  -TB 3  -MF 4  -MF    Moving to and from a bed to a chair (including a wheelchair)? 4  -TB 3  -MF 3  -MF    Standing up from a chair using your arms (e.g., wheelchair, bedside chair)? 4  -TB 3  -MF 3  -MF    Climbing 3-5 steps with a railing? 3  -TB 3  -MF 3  -MF    To walk in hospital room? 3  -TB 3  -MF 3  -MF    AM-PAC 6 Clicks Score (PT) 22  -TB 18  -MF 20  -MF       Functional Assessment    Outcome Measure Options AM-PAC 6 Clicks Basic Mobility (PT)  -TB AM-PAC 6 Clicks Basic Mobility (PT)  -MF AM-PAC 6 Clicks Basic Mobility (PT)  -MF          User Key  (r) = Recorded By, (t) = Taken By, (c) = Cosigned By    Initials Name Provider Type     Lucrecia Mello, SHIMA Physical Therapist Assistant    Shital Giles PTA Physical Therapist Assistant                 Time Calculation:    PT Charges     Row Name 01/09/23 1344             Time Calculation    Start Time 1008  -TB      Stop Time 1032  -TB      Time Calculation (min) 24 min  -TB      PT Received On 01/09/23  -TB         Time Calculation- PT    Total Timed Code Minutes- PT 24 minute(s)  -TB            User Key  (r) = Recorded By, (t) = Taken By, (c) = Cosigned By    Initials Name Provider Type    TB Shital Bragg PTA Physical Therapist Assistant              Therapy Charges for Today     Code Description Service Date Service Provider Modifiers Qty    [id number] HC GAIT TRAINING EA 15 MIN 1/9/2023 Shital Bragg PTA GP 2          PT G-Codes  Outcome Measure Options: AM-PAC 6 Clicks Basic Mobility (PT)  AM-PAC 6 Clicks Score (PT): 22  AM-PAC 6 Clicks Score (OT): 19    Shital Bragg PTA  1/9/2023

## 2023-01-09 NOTE — PLAN OF CARE
Goal Outcome Evaluation:  Plan of Care Reviewed With: patient        Progress: improving  Outcome Evaluation: Pt in bed agreeable to therapy. Pt on RA w/ sat 92%. Bed mob SBA to EOB. Performed AROM BLE x15. Placed pt on 2L for activity. Stood SBA. AMb 150'x2 w/ RW and SBA. Seated rest required. Pt had increased SOA on the way back so increased O2 to 3L. Once seated in room O2 read 87%. Pt recovered to 95% within a min. Took pt off O2 and he stayed between 90-92%. Will cont POC.

## 2023-01-09 NOTE — PROGRESS NOTES
[facility] Medicine Services  INPATIENT PROGRESS NOTE    Patient Name: Karoline Prater  Date of Admission: 1/5/2023  Today's Date: 01/09/23  Length of Stay: 4  Primary Care Physician: Irving Alexis MD    Subjective   Chief Complaint: follow up afib  HPI   Doing ok.  A-fib currently rate controlled  No palpitations, CP, or SOA        Review of Systems   Constitutional: Positive for fatigue. Negative for fever.   HENT: Negative for congestion and ear pain.    Eyes: Negative for redness and visual disturbance.   Respiratory: Negative for cough, shortness of breath and wheezing.    Cardiovascular: Negative for chest pain and palpitations.   Gastrointestinal: Negative for abdominal pain, diarrhea, nausea and vomiting.   Endocrine: Negative for cold intolerance and heat intolerance.   Genitourinary: Negative for dysuria and frequency.   Musculoskeletal: Negative for arthralgias and back pain.   Skin: Negative for rash and wound.   Neurological: Positive for weakness. Negative for dizziness and headaches.   Psychiatric/Behavioral: Negative for confusion. The patient is not nervous/anxious.           All pertinent negatives and positives are as above. All other systems have been reviewed and are negative unless otherwise stated.     Objective    Temp:  [97 °F (36.1 °C)-98.4 °F (36.9 °C)] 97.8 °F (36.6 °C)  Heart Rate:  [] 89  Resp:  [16-18] 16  BP: (121-158)/(61-89) 158/83  Physical Exam  Vitals reviewed.   Constitutional:       Appearance: He is well-developed.   HENT:      Head: Normocephalic and atraumatic.      Right Ear: External ear normal.      Left Ear: External ear normal.      Nose: Nose normal.   Eyes:      General: No scleral icterus.        Right eye: No discharge.         Left eye: No discharge.      Conjunctiva/sclera: Conjunctivae normal.      Pupils: Pupils are equal, round, and reactive to light.   Neck:      Thyroid: No thyromegaly.      Trachea: No  tracheal deviation.   Cardiovascular:      Rate and Rhythm: Normal rate. Rhythm irregularly irregular.      Heart sounds: Normal heart sounds. No murmur heard.    No friction rub. No gallop.   Pulmonary:      Effort: Pulmonary effort is normal. No respiratory distress.      Breath sounds: Normal breath sounds. No stridor. No wheezing or rales.   Chest:      Chest wall: No tenderness.   Abdominal:      General: Bowel sounds are normal. There is no distension.      Palpations: Abdomen is soft. There is no mass.      Tenderness: There is no abdominal tenderness. There is no guarding or rebound.      Hernia: No hernia is present.   Musculoskeletal:         General: No deformity. Normal range of motion.      Cervical back: Normal range of motion and neck supple.   Lymphadenopathy:      Cervical: No cervical adenopathy.   Skin:     General: Skin is warm and dry.      Coloration: Skin is not pale.      Findings: No erythema or rash.   Neurological:      Mental Status: He is alert and oriented to person, place, and time.      Cranial Nerves: No cranial nerve deficit.      Motor: No abnormal muscle tone.      Coordination: Coordination normal.      Deep Tendon Reflexes: Reflexes are normal and symmetric. Reflexes normal.   Psychiatric:         Behavior: Behavior normal.         Thought Content: Thought content normal.         Judgment: Judgment normal.             Results Review:  I have reviewed the labs, radiology results, and diagnostic studies.    Laboratory Data:   Results from last 7 days   Lab Units 01/09/23 0428 01/08/23 0303 01/07/23  0435   WBC 10*3/mm3 7.57 8.66 10.45   HEMOGLOBIN g/dL 12.2* 12.6* 12.6*   HEMATOCRIT % 40.4 40.9 40.7   PLATELETS 10*3/mm3 133* 123* 122*        Results from last 7 days   Lab Units 01/09/23  0428 01/08/23  0303 01/07/23  0435 01/06/23  0620 01/05/23  1505 01/05/23  1430   SODIUM mmol/L 137 138 138 131*  --  135*   SODIUM, ARTERIAL   --   --   --   --    < >  --    POTASSIUM mmol/L  4.1 4.2 4.1 4.4  --  4.3   CHLORIDE mmol/L 106 109* 106 103  --  102   CO2 mmol/L 21.0* 20.0* 21.0* 21.0*  --  22.0   BUN mg/dL 32* 33* 39* 49*  --  50*   CREATININE mg/dL 1.78* 1.91* 2.05* 2.37*  --  2.63*   CALCIUM mg/dL 9.8 9.6 9.9 9.2  --  9.0   BILIRUBIN mg/dL  --   --  0.5 0.6  --  0.6   ALK PHOS U/L  --   --  75 78  --  80   ALT (SGPT) U/L  --   --  14 13  --  15   AST (SGOT) U/L  --   --  27 24  --  20   GLUCOSE mg/dL 91 125* 114* 89  --  184*    < > = values in this interval not displayed.       Culture Data:   Blood Culture   Date Value Ref Range Status   01/05/2023 No growth at 3 days  Preliminary   01/05/2023 No growth at 3 days  Preliminary       Radiology Data:   Imaging Results (Last 24 Hours)     ** No results found for the last 24 hours. **          I have reviewed the patient's current medications.     Assessment/Plan   Assessment  Active Hospital Problems    Diagnosis    • **Adenocarcinoma, lung (HCC)    • Fibrotic lung diseases (HCC)    • Stage 3 chronic kidney disease (HCC)        Treatment Plan    1.  A-fib  -Metoprolol  -Patient declines anticoagulation    2.  Stage III CKD  -monitor renal function    3.  Loculated pleural effusion  -CTS consulted, recommends conservative management    4.  HTN  -Metoprolol    5.  GERD  -Protonix    6.  Adenocarcinoma of the lung  -monitor    7.  BPH  -flomax    Medical Decision Making  Number and Complexity of problems: 7 problems, moderate complexity    Differential Diagnosis: a-fib confirmed on telemetry    Conditions and Status        Condition is improving.     MDM Data  External documents reviewed: CTS notes  Cardiac tracing (EKG, telemetry) interpretation: Telemetry:  Rate controlled a-fib  Radiology interpretation: CT abdomen and pelvis:  Right sided pleural effusion  Labs reviewed: Renal function improving       Decision rules/scores evaluated (example [id number], Wells, etc): [id number], patient declines AC     Discussed with: Patient, nursing      Care Planning   Shared decision making: Patient agreeable to plan  Code status and discussions: CPR    Disposition  Social Determinants of Health that impact treatment or disposition: n/a  I expect the patient to be discharged to SNF in 2-3 days    Electronically signed by Kenneth Prater MD, 01/09/23, 10:10 CST.

## 2023-01-09 NOTE — THERAPY TREATMENT NOTE
Acute Care - Physical Therapy Treatment Note  [facility]     Patient Name: Karoline Prater  : 1940  MRN: [id number]  Today's Date: 2023      Visit Dx:     ICD-10-CM ICD-9-CM   1. NSTEMI (non-ST elevated myocardial infarction) (HCC)  I21.4 410.70   2. Chronic kidney disease, unspecified CKD stage  N18.9 585.9   3. Adenocarcinoma (HCC)  C80.1 199.1   4. Abdominal pain, unspecified abdominal location  R10.9 789.00   5. Impaired mobility  Z74.09 799.89     Patient Active Problem List   Diagnosis   • Dyspnea   • Essential hypertension   • NSVT (nonsustained ventricular tachycardia)   • Malignant neoplasm of upper lobe of right lung (HCC)   • Stage 3 chronic kidney disease (HCC)   • Adenocarcinoma, lung (HCC)   • Pancytopenia due to antineoplastic chemotherapy (HCC)   • Pneumonia due to infectious organism   • Function kidney decreased   • Cellulitis   • Acute respiratory failure with hypoxia (HCC)   • Fibrotic lung diseases (HCC)   • Macrocytic anemia   • Thrombocytopenia (HCC)   • Sepsis (HCC)   • Right pneumothorax   • Hyperkalemia   • Acute renal failure superimposed on chronic kidney disease (HCC)     Past Medical History:   Diagnosis Date   • Arthritis    • Atrial fibrillation with rapid ventricular response (HCC) 2019   • Blindness of left eye    • BPH with obstruction/lower urinary tract symptoms    • Cancer (HCC)     stomach & lung   • CHF (congestive heart failure) (HCC)    • CKD (chronic kidney disease) stage 3, GFR 30-59 ml/min (HCC)    • Coronary artery disease    • Elevated cholesterol    • Essential hypertension 10/2/2017   • Fibrotic lung diseases (HCC)    • GERD (gastroesophageal reflux disease)    • Hearing loss    • History of transfusion    • Hydronephrosis of left kidney    • Hyperlipidemia    • Hypertension     pt was taken off of all of his medications for BP (atenolol, lisinopril, lasix) because his BP kept bottoming out so his primary dr told him to discontinue them 1-2 months  ago (Jan/Feb 2019). pt states he takes no medications currently.   • Mass of duodenum versus letty hepatis  4/27/2019   • Mass of left renal hilum  4/27/2019   • Mass of upper lobe of right lung 02/2019    mass is shrinking on its own, so pt states Dr. Patel is just going to keep an eye on it and not do surgery right now.   • Mediastinal adenopathy 10/24/2018    [facility]   • Monoclonal gammopathy of unknown significance (MGUS) 9/11/2018   • Pancreatic mass     pt states he had this in 2013 but it went away on its own. Now recent CT shows it has come back so he is going to have an ultrasound on 3/13/19.   • Shortness of breath      Past Surgical History:   Procedure Laterality Date   • ABDOMINAL SURGERY     • BRONCHOSCOPY N/A 10/24/2018    Procedure: BRONCHOSCOPY WITH BIOPSY, EBUS;  Surgeon: Gareth Becerra MD;  Location: [facility] OR;  Service: Pulmonary   • CHOLECYSTECTOMY     • COLONOSCOPY N/A 1/3/2019    Procedure: COLONOSCOPY WITH ANESTHESIA;  Surgeon: Randy Somers DO;  Location: [facility] ENDOSCOPY;  Service: Gastroenterology   • CYSTOSCOPY, RETROGRADE PYELOGRAM AND STENT INSERTION Left 3/8/2019    Procedure: CYSTOSCOPY RETROGRADE BILATERAL PYELOGRAM;  Surgeon: Jos Sylvester MD;  Location: [facility] OR;  Service: Urology   • ENDOSCOPY N/A 12/11/2018    Procedure: ESOPHAGOGASTRODUODENOSCOPY WITH ANESTHESIA;  Surgeon: Randy Somers DO;  Location: [facility] ENDOSCOPY;  Service: Gastroenterology   • ENDOSCOPY N/A 4/29/2019    Procedure: ESOPHAGOGASTRODUODENOSCOPY WITH ANESTHESIA;  Surgeon: Lilliam Jj MD;  Location: [facility] OR;  Service: Gastroenterology   • ENDOSCOPY N/A 5/9/2019    Procedure: ESOPHAGOGASTRODUODENOSCOPY WITH ANESTHESIA;  Surgeon: Pilo Bansal MD;  Location: [facility] ENDOSCOPY;  Service: Gastroenterology   • EYE SURGERY Left 1964   • FOOT SURGERY Right 1966    joint   • FRACTURE SURGERY       PT Assessment (last 12 hours)     PT Evaluation and Treatment     Row Name 01/09/23 1416  01/09/23 1008       Physical Therapy Time and Intention    Subjective Information no complaints  -TB no complaints  -TB    Document Type therapy note (daily note)  -TB therapy note (daily note)  -TB    Mode of Treatment physical therapy  -TB physical therapy  -TB    Patient Effort good  -TB good  -TB    Comment -- RA at rest and 2-3 for activity  -TB    Row Name 01/09/23 1412 01/09/23 1008       General Information    Existing Precautions/Restrictions fall;oxygen therapy device and L/min  -TB fall;oxygen therapy device and L/min  -TB    Row Name 01/09/23 1412 01/09/23 1008       Pain    Pretreatment Pain Rating 0/10 - no pain  -TB 0/10 - no pain  -TB    Posttreatment Pain Rating 0/10 - no pain  -TB 0/10 - no pain  -TB    Row Name 01/09/23 1412 01/09/23 1008       Bed Mobility    Bed Mobility scooting/bridging;supine-sit  -TB scooting/bridging;supine-sit;sit-supine  -TB    Scooting/Bridging [place] (Bed Mobility) standby assist  -TB standby assist  -TB    Supine-Sit [place] (Bed Mobility) standby assist  -TB standby assist  -TB    Sit-Supine [place] (Bed Mobility) -- standby assist  -TB    Assistive Device (Bed Mobility) head of bed elevated;bed rails  -TB head of bed elevated;bed rails  -TB    Row Name 01/09/23 1008          Transfers    Transfers sit-stand transfer;stand-sit transfer  -TB     Row Name 01/09/23 1008          Sit-Stand Transfer    Sit-Stand [place] (Transfers) standby assist  -TB     Assistive Device (Sit-Stand Transfers) walker, front-wheeled  -TB     Row Name 01/09/23 1008          Stand-Sit Transfer    Stand-Sit [place] (Transfers) standby assist  -TB     Assistive Device (Stand-Sit Transfers) walker, front-wheeled  -TB     Row Name 01/09/23 1008          Gait/Stairs (Locomotion)    [place] Level (Gait) contact guard;standby assist  -TB     Assistive Device (Gait) walker, front-wheeled  -TB     Distance in Feet (Gait) 150'x2  1 seated rest break  -TB      Deviations/Abnormal Patterns (Gait) stride length decreased;hailey decreased  -TB     Bilateral Gait Deviations forward flexed posture  -TB     Row Name 01/09/23 1412          Balance    Balance Assessment sitting static balance;sitting dynamic balance  -TB     Static Sitting Balance supervision  -TB     Dynamic Sitting Balance supervision  -TB     Position, Sitting Balance sitting edge of bed  -TB     Row Name 01/09/23 1412          Motor Skills    Therapeutic Exercise --  AROM BLE x15  -TB     Row Name 01/09/23 1008          Plan of Care Review    Plan of Care Reviewed With patient  -TB     Progress improving  -TB     Outcome Evaluation Pt in bed agreeable to therapy. Pt on RA w/ sat 92%. Bed mob SBA to EOB. Performed AROM BLE x15. Placed pt on 2L for activity. Stood SBA. AMb 150'x2 w/ RW and SBA. Seated rest required. Pt had increased SOA on the way back so increased O2 to 3L. Once seated in room O2 read 87%. Pt recovered to 95% within a min. Took pt off O2 and he stayed between 90-92%. Will cont POC.  -TB     Row Name 01/09/23 1412 01/09/23 1008       Positioning and Restraints    Pre-Treatment Position in bed  -TB in bed  -TB    Post Treatment Position bed  -TB bed  -TB    In Bed fowlers;call light within reach;encouraged to call for assist;side rails up x2  -TB fowlers;call light within reach;encouraged to call for assist;side rails up x2  -TB          User Key  (r) = Recorded By, (t) = Taken By, (c) = Cosigned By    Initials Name Provider Type    TB Shital Bragg, PTA Physical Therapist Assistant                Physical Therapy Education     Title: PT OT SLP Therapies (In Progress)     Topic: Physical Therapy (Done)     Point: Mobility training (Done)     Learning Progress Summary           Patient Acceptance, E,D, DU,VU by  at 1/6/2023 1158    Comment: benefits of PT and POC, call for A OOB                   Point: Precautions (Done)     Learning Progress Summary           Patient Acceptance, E,D,  BECK,VU by  at 1/6/2023 1158    Comment: benefits of PT and POC, call for A OOB                               User Key     Initials Effective Dates Name Provider Type Discipline     06/16/21 -  Martín Lu, PT Physical Therapist PT              PT Recommendation and Plan     Plan of Care Reviewed With: patient  Progress: improving  Outcome Evaluation: Pt in bed agreeable to therapy. Pt on RA w/ sat 92%. Bed mob SBA to EOB. Performed AROM BLE x15. Placed pt on 2L for activity. Stood SBA. AMb 150'x2 w/ RW and SBA. Seated rest required. Pt had increased SOA on the way back so increased O2 to 3L. Once seated in room O2 read 87%. Pt recovered to 95% within a min. Took pt off O2 and he stayed between 90-92%. Will cont POC.   Outcome Measures     Row Name 01/09/23 1032 01/08/23 1137 01/07/23 1128       How much help from another person do you currently need...    Turning from your back to your side while in flat bed without using bedrails? 4  -TB 3  -MF 4  -MF    Moving from lying on back to sitting on the side of a flat bed without bedrails? 4  -TB 3  -MF 4  -MF    Moving to and from a bed to a chair (including a wheelchair)? 4  -TB 3  -MF 3  -MF    Standing up from a chair using your arms (e.g., wheelchair, bedside chair)? 4  -TB 3  -MF 3  -MF    Climbing 3-5 steps with a railing? 3  -TB 3  -MF 3  -MF    To walk in hospital room? 3  -TB 3  -MF 3  -MF    AM-PAC 6 Clicks Score (PT) 22  -TB 18  -MF 20  -MF       Functional Assessment    Outcome Measure Options AM-PAC 6 Clicks Basic Mobility (PT)  -TB AM-PAC 6 Clicks Basic Mobility (PT)  -MF AM-PAC 6 Clicks Basic Mobility (PT)  -MF          User Key  (r) = Recorded By, (t) = Taken By, (c) = Cosigned By    Initials Name Provider Type    Lucrecia Roblero, PTA Physical Therapist Assistant    Shital Giles, PTA Physical Therapist Assistant                 Time Calculation:    PT Charges     Row Name 01/09/23 1500 01/09/23 1344          Time Calculation     Start Time 1412  -TB 1008  -TB     Stop Time 1437  -TB 1032  -TB     Time Calculation (min) 25 min  -TB 24 min  -TB     PT Received On 01/09/23  -TB 01/09/23  -TB        Time Calculation- PT    Total Timed Code Minutes- PT 25 minute(s)  -TB 24 minute(s)  -TB           User Key  (r) = Recorded By, (t) = Taken By, (c) = Cosigned By    Initials Name Provider Type    TB Shital Bragg PTA Physical Therapist Assistant              Therapy Charges for Today     Code Description Service Date Service Provider Modifiers Qty    03073056140 HC GAIT TRAINING EA 15 MIN 1/9/2023 Shital Bragg, SHIMA GP 2    93975397756 HC PT THER PROC EA 15 MIN 1/9/2023 Shital Bragg, PTA GP 1    90305354682 HC PT THERAPEUTIC ACT EA 15 MIN 1/9/2023 Shital Bragg, PTA GP 1          PT G-Codes  Outcome Measure Options: AM-PAC 6 Clicks Basic Mobility (PT)  AM-PAC 6 Clicks Score (PT): 22  AM-PAC 6 Clicks Score (OT): 19    Shital Bragg PTA  1/9/2023

## 2023-01-09 NOTE — CASE MANAGEMENT/SOCIAL WORK
Continued Stay Note   [place]     Patient Name: Karoline Prater  MRN: [id number]  Today's Date: 1/9/2023    Admit Date: 1/5/2023    Plan: Home   Discharge Plan     Row Name 01/09/23 1146       Plan    Plan Home    Plan Comments Anticipate discharge home soon. Patient has been able to ambulate 400 ft with therapy and does not want home health care.    Final Discharge Disposition Code 01 - home or self-care                Expected Discharge Date and Time     Expected Discharge Date Expected Discharge Time    Jan 11, 2023             HARRY Rizvi

## 2023-01-10 ENCOUNTER — READMISSION MANAGEMENT (OUTPATIENT)
Dept: CALL CENTER | Facility: HOSPITAL | Age: 83
End: 2023-01-10
Payer: MEDICARE

## 2023-01-10 VITALS
BODY MASS INDEX: 29.73 KG/M2 | HEART RATE: 91 BPM | TEMPERATURE: 98.4 F | HEIGHT: 64 IN | DIASTOLIC BLOOD PRESSURE: 86 MMHG | OXYGEN SATURATION: 92 % | RESPIRATION RATE: 16 BRPM | SYSTOLIC BLOOD PRESSURE: 142 MMHG | WEIGHT: 174.13 LBS

## 2023-01-10 PROBLEM — I48.91 A-FIB (HCC): Status: ACTIVE | Noted: 2023-01-10

## 2023-01-10 PROBLEM — K21.9 GERD WITHOUT ESOPHAGITIS: Status: ACTIVE | Noted: 2023-01-10

## 2023-01-10 LAB
ANION GAP SERPL CALCULATED.3IONS-SCNC: 10 MMOL/L (ref 5–15)
BACTERIA SPEC AEROBE CULT: NORMAL
BACTERIA SPEC AEROBE CULT: NORMAL
BUN SERPL-MCNC: 30 MG/DL (ref 8–23)
BUN/CREAT SERPL: 19 (ref 7–25)
CALCIUM SPEC-SCNC: 9.7 MG/DL (ref 8.6–10.5)
CHLORIDE SERPL-SCNC: 107 MMOL/L (ref 98–107)
CO2 SERPL-SCNC: 20 MMOL/L (ref 22–29)
CREAT SERPL-MCNC: 1.58 MG/DL (ref 0.76–1.27)
DEPRECATED RDW RBC AUTO: 48.7 FL (ref 37–54)
EGFRCR SERPLBLD CKD-EPI 2021: 43.9 ML/MIN/1.73
ERYTHROCYTE [DISTWIDTH] IN BLOOD BY AUTOMATED COUNT: 14.1 % (ref 12.3–15.4)
GLUCOSE SERPL-MCNC: 97 MG/DL (ref 65–99)
HCT VFR BLD AUTO: 42.8 % (ref 37.5–51)
HGB BLD-MCNC: 13.1 G/DL (ref 13–17.7)
MCH RBC QN AUTO: 29 PG (ref 26.6–33)
MCHC RBC AUTO-ENTMCNC: 30.6 G/DL (ref 31.5–35.7)
MCV RBC AUTO: 94.9 FL (ref 79–97)
PLATELET # BLD AUTO: 170 10*3/MM3 (ref 140–450)
PMV BLD AUTO: 11.7 FL (ref 6–12)
POTASSIUM SERPL-SCNC: 4.2 MMOL/L (ref 3.5–5.2)
RBC # BLD AUTO: 4.51 10*6/MM3 (ref 4.14–5.8)
SODIUM SERPL-SCNC: 137 MMOL/L (ref 136–145)
WBC NRBC COR # BLD: 9.77 10*3/MM3 (ref 3.4–10.8)

## 2023-01-10 PROCEDURE — 85027 COMPLETE CBC AUTOMATED: CPT | Performed by: FAMILY MEDICINE

## 2023-01-10 PROCEDURE — 25010000002 ENOXAPARIN PER 10 MG: Performed by: INTERNAL MEDICINE

## 2023-01-10 PROCEDURE — 97116 GAIT TRAINING THERAPY: CPT

## 2023-01-10 PROCEDURE — 80048 BASIC METABOLIC PNL TOTAL CA: CPT | Performed by: FAMILY MEDICINE

## 2023-01-10 RX ORDER — METOPROLOL SUCCINATE 100 MG/1
100 TABLET, EXTENDED RELEASE ORAL DAILY
Qty: 30 TABLET | Refills: 1 | Status: SHIPPED | OUTPATIENT
Start: 2023-01-10

## 2023-01-10 RX ADMIN — PANTOPRAZOLE SODIUM 40 MG: 40 TABLET, DELAYED RELEASE ORAL at 07:53

## 2023-01-10 RX ADMIN — Medication 10 ML: at 08:56

## 2023-01-10 RX ADMIN — ENOXAPARIN SODIUM 30 MG: 30 INJECTION SUBCUTANEOUS at 08:55

## 2023-01-10 RX ADMIN — METOPROLOL TARTRATE 50 MG: 50 TABLET, FILM COATED ORAL at 08:55

## 2023-01-10 RX ADMIN — GLYCERIN 1 DROP: .002; .002; .01 SOLUTION/ DROPS OPHTHALMIC at 06:12

## 2023-01-10 NOTE — PLAN OF CARE
Goal Outcome Evaluation:  Plan of Care Reviewed With: patient        Progress: improving  Outcome Evaluation: Pt w/ no complaints. Pt on 2L w/ sat low 90s. Bed mob Mod Ind to EOB. Performed AROM BLE x15 while seated. Lots of education on home safety, energy consevation, fall safety, pursed lip breathing, and went over HEP. Pt stood SBA. Amb 150'x2 w/ RW and SBA. Pt amb 2-3L for activity. O2 decreased to 88% w/ acitvity but he recovered well once seated in bed.

## 2023-01-10 NOTE — CASE MANAGEMENT/SOCIAL WORK
Continued Stay Note  [place]     Patient Name: Karoline Prater  MRN: [id number]  Today's Date: 1/10/2023    Admit Date: 1/5/2023    Plan: Home   Discharge Plan     Row Name 01/10/23 0905       Plan    Plan Home    Final Discharge Disposition Code 01 - home or self-care    Final Note Patient is discharged home today. Patient does not want home health care.                       Expected Discharge Date and Time     Expected Discharge Date Expected Discharge Time    Asif 10, 2023             HARRY Rizvi

## 2023-01-10 NOTE — THERAPY TREATMENT NOTE
Acute Care - Physical Therapy Treatment Note  [facility]     Patient Name: Karoline Prater  : 1940  MRN: [id number]  Today's Date: 1/10/2023      Visit Dx:     ICD-10-CM ICD-9-CM   1. NSTEMI (non-ST elevated myocardial infarction) (HCC)  I21.4 410.70   2. Chronic kidney disease, unspecified CKD stage  N18.9 585.9   3. Adenocarcinoma (HCC)  C80.1 199.1   4. Abdominal pain, unspecified abdominal location  R10.9 789.00   5. Impaired mobility  Z74.09 799.89   6. Hypoxia  R09.02 799.02     Patient Active Problem List   Diagnosis   • Dyspnea   • Essential hypertension   • NSVT (nonsustained ventricular tachycardia)   • Malignant neoplasm of upper lobe of right lung (HCC)   • Stage 3 chronic kidney disease (HCC)   • Adenocarcinoma, lung (HCC)   • Pancytopenia due to antineoplastic chemotherapy (HCC)   • Pneumonia due to infectious organism   • Function kidney decreased   • Cellulitis   • Acute respiratory failure with hypoxia (HCC)   • Fibrotic lung diseases (HCC)   • Macrocytic anemia   • Thrombocytopenia (HCC)   • Sepsis (HCC)   • Right pneumothorax   • Hyperkalemia   • Acute renal failure superimposed on chronic kidney disease (HCC)     Past Medical History:   Diagnosis Date   • Arthritis    • Atrial fibrillation with rapid ventricular response (HCC) 2019   • Blindness of left eye    • BPH with obstruction/lower urinary tract symptoms    • Cancer (HCC)     stomach & lung   • CHF (congestive heart failure) (HCC)    • CKD (chronic kidney disease) stage 3, GFR 30-59 ml/min (HCC)    • Coronary artery disease    • Elevated cholesterol    • Essential hypertension 10/2/2017   • Fibrotic lung diseases (HCC)    • GERD (gastroesophageal reflux disease)    • Hearing loss    • History of transfusion    • Hydronephrosis of left kidney    • Hyperlipidemia    • Hypertension     pt was taken off of all of his medications for BP (atenolol, lisinopril, lasix) because his BP kept bottoming out so his primary dr told him  to discontinue them 1-2 months ago (Jan/Feb 2019). pt states he takes no medications currently.   • Mass of duodenum versus letty hepatis  4/27/2019   • Mass of left renal hilum  4/27/2019   • Mass of upper lobe of right lung 02/2019    mass is shrinking on its own, so pt states Dr. Patel is just going to keep an eye on it and not do surgery right now.   • Mediastinal adenopathy 10/24/2018    [facility]   • Monoclonal gammopathy of unknown significance (MGUS) 9/11/2018   • Pancreatic mass     pt states he had this in 2013 but it went away on its own. Now recent CT shows it has come back so he is going to have an ultrasound on 3/13/19.   • Shortness of breath      Past Surgical History:   Procedure Laterality Date   • ABDOMINAL SURGERY     • BRONCHOSCOPY N/A 10/24/2018    Procedure: BRONCHOSCOPY WITH BIOPSY, EBUS;  Surgeon: Gareth Becerra MD;  Location: [facility] OR;  Service: Pulmonary   • CHOLECYSTECTOMY     • COLONOSCOPY N/A 1/3/2019    Procedure: COLONOSCOPY WITH ANESTHESIA;  Surgeon: Randy Somers DO;  Location: [facility] ENDOSCOPY;  Service: Gastroenterology   • CYSTOSCOPY, RETROGRADE PYELOGRAM AND STENT INSERTION Left 3/8/2019    Procedure: CYSTOSCOPY RETROGRADE BILATERAL PYELOGRAM;  Surgeon: Jos Sylvester MD;  Location: [facility] OR;  Service: Urology   • ENDOSCOPY N/A 12/11/2018    Procedure: ESOPHAGOGASTRODUODENOSCOPY WITH ANESTHESIA;  Surgeon: Randy Somers DO;  Location: [facility] ENDOSCOPY;  Service: Gastroenterology   • ENDOSCOPY N/A 4/29/2019    Procedure: ESOPHAGOGASTRODUODENOSCOPY WITH ANESTHESIA;  Surgeon: Lilliam Jj MD;  Location: [facility] OR;  Service: Gastroenterology   • ENDOSCOPY N/A 5/9/2019    Procedure: ESOPHAGOGASTRODUODENOSCOPY WITH ANESTHESIA;  Surgeon: Pilo Bansal MD;  Location: [facility] ENDOSCOPY;  Service: Gastroenterology   • EYE SURGERY Left 1964   • FOOT SURGERY Right 1966    joint   • FRACTURE SURGERY       PT Assessment (last 12 hours)     PT Evaluation and  Treatment     Row Name 01/10/23 0906          Physical Therapy Time and Intention    Subjective Information no complaints  -TB     Document Type therapy note (daily note)  -TB     Mode of Treatment physical therapy  -TB     Patient Effort good  -TB     Comment 2L for rest 2-3 for activity  -TB     Row Name 01/10/23 0906          General Information    Existing Precautions/Restrictions fall;oxygen therapy device and L/min  -TB     Row Name 01/10/23 0906          Pain    Pretreatment Pain Rating 0/10 - no pain  -TB     Posttreatment Pain Rating 0/10 - no pain  -TB     Row Name 01/10/23 0906          Bed Mobility    Bed Mobility scooting/bridging;supine-sit;sit-supine  -TB     Scooting/Bridging [place] (Bed Mobility) modified independence  -TB     Supine-Sit [place] (Bed Mobility) modified independence  -TB     Sit-Supine [place] (Bed Mobility) modified independence  -TB     Assistive Device (Bed Mobility) head of bed elevated  -TB     Row Name 01/10/23 0906          Transfers    Transfers sit-stand transfer;stand-sit transfer  -TB     Row Name 01/10/23 0906          Sit-Stand Transfer    Sit-Stand [place] (Transfers) standby assist  -TB     Row Name 01/10/23 0906          Stand-Sit Transfer    Stand-Sit [place] (Transfers) standby assist  -TB     Row Name 01/10/23 0906          Gait/Stairs (Locomotion)    [place] Level (Gait) standby assist  -TB     Assistive Device (Gait) walker, front-wheeled  -TB     Distance in Feet (Gait) 150'x2  Seated rest  -TB     Bilateral Gait Deviations forward flexed posture  -TB     Comment, (Gait/Stairs) Cues for pursed lip breathing  -TB     Row Name 01/10/23 0906          Balance    Balance Assessment sitting static balance;sitting dynamic balance  -TB     Static Sitting Balance modified independence  -TB     Dynamic Sitting Balance modified independence  -TB     Position, Sitting Balance sitting edge of bed  -TB     Row Name 01/10/23 0906          Motor  Skills    Therapeutic Exercise --  AROM BLE x15  -TB     Row Name 01/10/23 0906          Plan of Care Review    Plan of Care Reviewed With patient  -TB     Progress improving  -TB     Outcome Evaluation Pt w/ no complaints. Pt on 2L w/ sat low 90s. Bed mob Mod Ind to EOB. Performed AROM BLE x15 while seated. Lots of education on home safety, energy consevation, fall safety, pursed lip breathing, and went over HEP. Pt stood SBA. Amb 150'x2 w/ RW and SBA. Pt amb 2-3L for activity. O2 decreased to 88% w/ acitvity but he recovered well once seated in bed.  -TB     Row Name 01/10/23 0906          Positioning and Restraints    Pre-Treatment Position in bed  -TB     Post Treatment Position bed  -TB     In Bed fowlers;call light within reach;encouraged to call for assist;side rails up x2  -TB           User Key  (r) = Recorded By, (t) = Taken By, (c) = Cosigned By    Initials Name Provider Type    TB Shital Bragg, PTA Physical Therapist Assistant                Physical Therapy Education     Title: PT OT SLP Therapies (In Progress)     Topic: Physical Therapy (Done)     Point: Mobility training (Done)     Learning Progress Summary           Patient Acceptance, E,D, DU,VU by  at 1/6/2023 1158    Comment: benefits of PT and POC, call for A OOB                   Point: Precautions (Done)     Learning Progress Summary           Patient Acceptance, E,D, DU,VU by  at 1/6/2023 1158    Comment: benefits of PT and POC, call for A OOB                               User Key     Initials Effective Dates Name Provider Type [facility] 06/16/21 -  Martín Lu, PT Physical Therapist PT              PT Recommendation and Plan     Plan of Care Reviewed With: patient  Progress: improving  Outcome Evaluation: Pt w/ no complaints. Pt on 2L w/ sat low 90s. Bed mob Mod Ind to EOB. Performed AROM BLE x15 while seated. Lots of education on home safety, energy consevation, fall safety, pursed lip breathing, and went over HEP. Pt  stood SBA. Amb 150'x2 w/ RW and SBA. Pt amb 2-3L for activity. O2 decreased to 88% w/ acitvity but he recovered well once seated in bed.   Outcome Measures     Row Name 01/10/23 0930 01/09/23 1032 01/08/23 1137       How much help from another person do you currently need...    Turning from your back to your side while in flat bed without using bedrails? 4  -TB 4  -TB 3  -MF    Moving from lying on back to sitting on the side of a flat bed without bedrails? 4  -TB 4  -TB 3  -MF    Moving to and from a bed to a chair (including a wheelchair)? 4  -TB 4  -TB 3  -MF    Standing up from a chair using your arms (e.g., wheelchair, bedside chair)? 4  -TB 4  -TB 3  -MF    Climbing 3-5 steps with a railing? 3  -TB 3  -TB 3  -MF    To walk in hospital room? 4  -TB 3  -TB 3  -MF    AM-PAC 6 Clicks Score (PT) 23  -TB 22  -TB 18  -MF       Functional Assessment    Outcome Measure Options AM-PAC 6 Clicks Basic Mobility (PT)  -TB AM-PAC 6 Clicks Basic Mobility (PT)  -TB AM-PAC 6 Clicks Basic Mobility (PT)  -MF          User Key  (r) = Recorded By, (t) = Taken By, (c) = Cosigned By    Initials Name Provider Type     Lucrecia Mello, PTA Physical Therapist Assistant    Shital Giles PTA Physical Therapist Assistant                 Time Calculation:    PT Charges     Row Name 01/10/23 1145             Time Calculation    Start Time 0906  -TB      Stop Time 0930  -TB      Time Calculation (min) 24 min  -TB      PT Received On 01/10/23  -TB         Time Calculation- PT    Total Timed Code Minutes- PT 24 minute(s)  -TB            User Key  (r) = Recorded By, (t) = Taken By, (c) = Cosigned By    Initials Name Provider Type    Shital Giles, SHIMA Physical Therapist Assistant              Therapy Charges for Today     Code Description Service Date Service Provider Modifiers Qty    33462869389 HC GAIT TRAINING EA 15 MIN 1/9/2023 Shital Bragg, PTA GP 2    47651997876 HC PT THER PROC EA 15 MIN 1/9/2023  Shital Bragg, PTA GP 1    83759642925 HC PT THERAPEUTIC ACT EA 15 MIN 1/9/2023 Shital Bragg, PTA GP 1    81042172121 HC GAIT TRAINING EA 15 MIN 1/10/2023 Shital Bragg, PTA GP 2          PT G-Codes  Outcome Measure Options: AM-PAC 6 Clicks Basic Mobility (PT)  AM-PAC 6 Clicks Score (PT): 23  AM-PAC 6 Clicks Score (OT): 19    Shital Bragg PTA  1/10/2023

## 2023-01-10 NOTE — PLAN OF CARE
Goal Outcome Evaluation:   Discharged ordered. Unable to get patient off oxygen.  Walking pulse ox ordered.  Qualified for portable oxygen.  [facility] notified and brought oxygen tank out.  Wrong tank brought.  Notified [facility].  They are taking the portable oxygen to the patient's home.  Taken out by lift team to brother.

## 2023-01-10 NOTE — PROGRESS NOTES
Exercise Oximetry    Patient Name:Karoline Prater   MRN: [id number]   Date: 01/10/23             ROOM AIR BASELINE   SpO2%           90   Heart Rate      89   Blood Pressure      EXERCISE ON ROOM AIR SpO2% EXERCISE ON O2 @ 2   LPM SpO2%   1 MINUTE 85 1 MINUTE 92   2 MINUTES  2 MINUTES 90   3 MINUTES  3 MINUTES 88   4 MINUTES  4 MINUTES 86   5 MINUTES  5 MINUTES       3lpm 90   6 MINUTES  6 MINUTES       3lpm 92              Distance Walked    Stood up Distance Walked         50 ft   Dyspnea (Pina Scale)   Dyspnea (Pina Scale)   Fatigue (Pina Scale)   Fatigue (Pina Scale)   SpO2% Post Exercise   SpO2% Post Exercise     93   HR Post Exercise   HR Post Exercise           109   Time to Recovery   Time to Recovery            4 minutes     Comments: Patient desaturates with minimal activity and could benefit from 3lpm with exercise.

## 2023-01-10 NOTE — PLAN OF CARE
Goal Outcome Evaluation:           Progress: improving  Outcome Evaluation: Patient with no c/o shortness of breath or pain. PRN 1L o2 placed on patient due to o2 sat in high 80s. Possible discharge today. VSS, safety maintained. S 73-81 on tele.   Addendum: S/ST  on tele.

## 2023-01-10 NOTE — CASE MANAGEMENT/SOCIAL WORK
Continued Stay Note  [facility]     Patient Name: Karoline Prater  MRN: [id number]  Today's Date: 1/10/2023    Admit Date: 1/5/2023    Plan: Home   Discharge Plan     Row Name 01/10/23 1009       Plan    Plan Home    Final Discharge Disposition Code 01 - home or self-care    Final Note Patient qualifies for home oxygen and orders are in. Patient would like to use [facility]. RACHEL called [facility] at [phone] and notified Cheryle of new referral and need for portable tank to room. RACHEL faxed referral to [facility] at [phone]. Portable tank will be delivered to room soon.                    Expected Discharge Date and Time     Expected Discharge Date Expected Discharge Time    Asif 10, 2023             HARRY Rizvi

## 2023-01-10 NOTE — DISCHARGE SUMMARY
[facility] Medicine Services  DISCHARGE SUMMARY       Date of Admission: 1/5/2023  Date of Discharge:  1/10/2023  Primary Care Physician: Irving Alexis MD    Presenting Problem/History of Present Illness:  Weakness    Final Discharge Diagnoses:  Active Hospital Problems    Diagnosis    • **Adenocarcinoma, lung (HCC)    • GERD without esophagitis    • A-fib (HCC)    • Fibrotic lung diseases (HCC)    • Stage 3 chronic kidney disease (HCC)    • Essential hypertension        Consults:   1.  Cardiology  2.  CT surgery    Procedures Performed: n/a    Pertinent Test Results:   Results for orders placed during the hospital encounter of 01/05/23    Adult Transthoracic Echo Complete W/ Cont if Necessary Per Protocol    Interpretation Summary  •  Left ventricular systolic function is normal. Left ventricular ejection fraction appears to be 61 - 65%.  •  Left ventricular diastolic function is consistent with (grade I) impaired relaxation and age.  •  Estimated right ventricular systolic pressure from tricuspid regurgitation is markedly elevated (>55 mmHg).  •  Normal size and function right ventricle.  •  No significant (greater than mild) valvular pathology.      Imaging Results (All)     Procedure Component Value Units Date/Time    CT Abdomen Pelvis Without Contrast [[id number]] Collected: 01/05/23 1828     Updated: 01/05/23 1842    Narrative:      CT ABDOMEN PELVIS WO CONTRAST- 1/5/2023 6:07 PM CST     HISTORY: abdominal pain      COMPARISON: Left 16 2022      DLP: 341 mGy cm. All CT scans are performed using dose optimization  techniques as appropriate to the performed exam and including at least  one of the following: Automated exposure control, adjustment of the mA  and/or kV according to size, and the use of the iterative reconstruction  technique.     TECHNIQUE: Noncontrast enhanced images of the abdomen and pelvis  obtained without oral contrast.      FINDINGS:   Bilateral  gynecomastia is present. As noted on the previous examination  there is pleural thickening within the right lateral and posterior lung  base with what appears to be a loculated effusion. The effusion shows  slight interval increase in size from the previous exam. There is  associated pleural nodularity. A portion of this effusion is subpulmonic  in location. Given the associated nodularity pleural-based metastasis  has to be considered in the differential within the right lung base.  There is interstitial fibrosis within both lungs particularly noted  within the lower lobes. There is bronchial wall thickening within both  lower lobes. Aneurysmal dilatation of the ascending thoracic aorta with  a transverse diameter of 4 cm. Heavy coronary calcifications are  present. There is mild cardiomegaly. No evidence of pericardial  effusion..      LIVER: Pneumobilia noted at the time of the previous exam has shown  diminishment. There is a small amount of residual air within the common  hepatic duct. There is minimal air within the left biliary tree. No  evidence of discrete hepatic mass..      BILIARY SYSTEM: The gallbladder is surgically absent. There is no  biliary dilatation present..      PANCREAS: No focal pancreatic lesion.      SPLEEN: A splenule is present. No evidence of splenomegaly..      KIDNEYS AND ADRENALS: The adrenals are unremarkable. There is  nonspecific bilateral perinephric stranding present. There are cortical  cysts of both kidneys. A lesion involving the lateral lower pole the  left kidney is more indeterminate in nature measuring Hounsfield units  of up to 45. This could represent a hemorrhagic cyst. A lesion involving  the posterior midpole of the left kidney also demonstrates rather high  Hounsfield units. These are stable from the previous examination. There  is a nonobstructing calculus involving the lower pole calyx of the left  kidney measuring 6 mm in size. No evidence of  right-sided  nephrolithiasis..  The ureters are decompressed and normal in  appearance.     RETROPERITONEUM: No mass, lymphadenopathy or hemorrhage.      GI TRACT: There is mild constipation. No obstruction or free air.. The  appendix is visualized and unremarkable. There is a loop of small bowel  which has been brought up into the right upper quadrant suggesting  previous biliary diversion.     OTHER: There is no mesenteric mass, lymphadenopathy or fluid collection.  The osseous structures and soft tissues demonstrate no worrisome  lesions. There is listhesis at both the L4-L5 and L5-S1 level. There is  bilateral spondylolysis at L5. The listhesis at L4-L5 is likely related  to subluxation at the level of the facets.      PELVIS: Bilateral fat-containing inguinal hernias are present. The  prostate gland is mildly enlarged. There is no free fluid in the pelvis.  There is calcific tendinosis of both common hamstring insertions at  their insertion on the ischial tuberosity.. The urinary bladder is  normal in appearance.       Impression:      1. Mild constipation. Otherwise normal bowel gas pattern.  2. Right-sided pleural effusion which appears loculated with associated  pleural thickening. The pleural thickening is somewhat nodular in  appearance and if the patient has a prior history of lung neoplasm could  potentially represent pleural-based studding. This shows some  progression from the previous examination. The effusion shows slight  interval increase in size. There is interstitial fibrosis within both  lung bases.  3. Mild aneurysmal dilatation of the ascending thoracic aorta. Heavy  coronary calcifications are present. There is no pericardial effusion.  4. Cortical cysts of both kidneys. There are again 2 lesions within the  left kidney which are more indeterminate but which are stable from the  previous exam. There is nonobstructing left-sided nephrolithiasis. No  evidence of ureteral stone or obstructive  uropathy. No free fluid or  localized inflammatory process is demonstrated.  5. Small bilateral fat-containing inguinal hernias. The prostate gland  is enlarged.  6. Listhesis at the L4-L5 and L5-S1 level..         This report was finalized on 01/05/2023 18:39 by Dr. Naveed Reynolds MD.    XR Chest 1 View [[id number]] Collected: 01/05/23 1417     Updated: 01/05/23 1422    Narrative:      EXAMINATION: XR CHEST 1 VW- 1/5/2023 2:17 PM CST     HISTORY: cough with congestion     REPORT: A frontal view of the chest was obtained.     COMPARISON: Chest x-ray 08/26/2021.     The lungs are hypoaerated, there aren't coarse reticular interstitial  markings diffusely as before most compatible with advanced pulmonary  fibrosis. There is chronic pleural thickening in the right lateral lung  base. No superimposed lung consolidation is identified. Heart size is  normal. The right internal jugular port catheter appears in good  position as before. No acute osseous abnormality. The upper abdomen  appears unremarkable.       Impression:      Extensive chronic pulmonary fibrosis which appears stable,  no definite superimposed acute infiltrate is identified. Chronic pleural  thickening and/or small right pleural effusion. Stable satisfactory  position of the right-sided port catheter.     This report was finalized on 01/05/2023 14:19 by Dr. Allen Churchill MD.        LAB RESULTS:      Lab 01/10/23  0531 01/09/23  0428 01/08/23  0303 01/07/23  0435 01/06/23  0620 01/05/23  1430   WBC 9.77 7.57 8.66 10.45 14.69* 19.99*   HEMOGLOBIN 13.1 12.2* 12.6* 12.6* 12.4* 13.3   HEMATOCRIT 42.8 40.4 40.9 40.7 39.0 41.1   PLATELETS 170 133* 123* 122* 114* 128*   NEUTROS ABS  --   --   --  8.60* 12.60* 18.37*   IMMATURE GRANS (ABS)  --   --   --  0.06* 0.13* 0.28*   LYMPHS ABS  --   --   --  0.69* 0.91 0.37*   MONOS ABS  --   --   --  1.02* 0.96* 0.95*   EOS ABS  --   --   --  0.06 0.06 0.00   MCV 94.9 96.7 94.9 95.5 93.8 93.4   LACTATE  --   --   --    --   --  2.0   PROTIME  --   --   --   --   --  16.2*   APTT  --   --   --   --   --  33.5         Lab 01/10/23  0531 01/09/23  0428 01/08/23  0303 01/07/23  0435 01/06/23  0620 01/05/23  1505 01/05/23  1430   SODIUM 137 137 138 138 131*  --  135*   SODIUM, ARTERIAL  --   --   --   --   --    < >  --    POTASSIUM 4.2 4.1 4.2 4.1 4.4  --  4.3   CHLORIDE 107 106 109* 106 103  --  102   CO2 20.0* 21.0* 20.0* 21.0* 21.0*  --  22.0   ANION GAP 10.0 10.0 9.0 11.0 7.0  --  11.0   BUN 30* 32* 33* 39* 49*  --  50*   CREATININE 1.58* 1.78* 1.91* 2.05* 2.37*  --  2.63*   EGFR 43.9* 38.1* 35.0* 32.2* 27.0*  --  23.8*   GLUCOSE 97 91 125* 114* 89  --  184*   CALCIUM 9.7 9.8 9.6 9.9 9.2  --  9.0   MAGNESIUM  --   --   --   --  1.8  --  1.8   PHOSPHORUS  --   --   --   --  3.0  --   --    HEMOGLOBIN A1C  --   --   --   --  5.50  --   --    TSH  --   --   --  1.150  --   --   --     < > = values in this interval not displayed.         Lab 01/07/23  0435 01/06/23  0620 01/05/23  1430   TOTAL PROTEIN 6.8 6.4 6.7   ALBUMIN 2.9* 2.8* 3.1*   GLOBULIN 3.9 3.6 3.6   ALT (SGPT) 14 13 15   AST (SGOT) 27 24 20   BILIRUBIN 0.5 0.6 0.6   ALK PHOS 75 78 80   LIPASE  --   --  9*         Lab 01/06/23  0620 01/05/23  1740 01/05/23  1430   PROBNP  --   --  753.2   TROPONIN T 0.050* 0.065* 0.051*   PROTIME  --   --  16.2*   INR  --   --  1.29*         Lab 01/07/23  0435 01/06/23  0620   CHOLESTEROL 95 83   LDL CHOL 50 38   HDL CHOL 23* 26*   TRIGLYCERIDES 120 94             Lab 01/05/23  1505   PH, ARTERIAL 7.447   PCO2, ARTERIAL 31.5*   PO2 ART 61.9*   O2 SATURATION ART 93.2*   HCO3 ART 21.7   BASE EXCESS ART -1.5*   CARBOXYHEMOGLOBIN 2.3     Brief Urine Lab Results  (Last result in the past 365 days)      Color   Clarity   Blood   Leuk Est   Nitrite   Protein   CREAT   Urine HCG        01/05/23 1445 Yellow   Clear   Negative   Negative   Negative   Trace               Microbiology Results (last 10 days)     Procedure Component Value - Date/Time     MRSA Screen, PCR (Inpatient) - Swab, Nares [[id number]]  (Normal) Collected: 01/07/23 1339    Lab Status: Final result Specimen: Swab from Nares Updated: 01/07/23 1506     MRSA PCR No MRSA Detected    Narrative:      The negative predictive value of this diagnostic test is high and should only be used to consider de-escalating anti-MRSA therapy. A positive result may indicate colonization with MRSA and must be correlated clinically.    S. Pneumo Ag Urine or CSF - Urine, Urine, Clean Catch [[id number]]  (Normal) Collected: 01/07/23 1338    Lab Status: Final result Specimen: Urine, Clean Catch Updated: 01/07/23 1414     Strep Pneumo Ag Negative    Legionella Antigen, Urine - Urine, Urine, Clean Catch [[id number]]  (Normal) Collected: 01/07/23 1338    Lab Status: Final result Specimen: Urine, Clean Catch Updated: 01/07/23 1414     LEGIONELLA ANTIGEN, URINE Negative    Respiratory Panel PCR w/COVID-19(SARS-CoV-2) JORDYN/MATILDE/BRYAN/PAD/COR/MAD/ASHER In-House, NP Swab in UTM/VTM, 3-4 HR TAT - Swab, Nasopharynx [[id number]]  (Normal) Collected: 01/07/23 1320    Lab Status: Final result Specimen: Swab from Nasopharynx Updated: 01/07/23 1417     ADENOVIRUS, PCR Not Detected     Coronavirus 229E Not Detected     Coronavirus HKU1 Not Detected     Coronavirus NL63 Not Detected     Coronavirus OC43 Not Detected     COVID19 Not Detected     Human Metapneumovirus Not Detected     Human Rhinovirus/Enterovirus Not Detected     Influenza A PCR Not Detected     Influenza B PCR Not Detected     Parainfluenza Virus 1 Not Detected     Parainfluenza Virus 2 Not Detected     Parainfluenza Virus 3 Not Detected     Parainfluenza Virus 4 Not Detected     RSV, PCR Not Detected     Bordetella pertussis pcr Not Detected     Bordetella parapertussis PCR Not Detected     Chlamydophila pneumoniae PCR Not Detected     Mycoplasma pneumo by PCR Not Detected    Narrative:      In the setting of a positive respiratory panel with a viral infection PLUS a negative  procalcitonin without other underlying concern for bacterial infection, consider observing off antibiotics or discontinuation of antibiotics and continue supportive care. If the respiratory panel is positive for atypical bacterial infection (Bordetella pertussis, Chlamydophila pneumoniae, or Mycoplasma pneumoniae), consider antibiotic de-escalation to target atypical bacterial infection.    COVID-19 and FLU A/B PCR - Swab, Nasopharynx [[id number]]  (Normal) Collected: 01/05/23 1436    Lab Status: Final result Specimen: Swab from Nasopharynx Updated: 01/05/23 1544     COVID19 Not Detected     Influenza A PCR Not Detected     Influenza B PCR Not Detected    Narrative:      Fact sheet for providers: https://www.fda.gov/media/724188/download    Fact sheet for patients: https://www.fda.gov/media/639724/download    Test performed by PCR.    Blood Culture - Blood, Arm, Left [[id number]]  (Normal) Collected: 01/05/23 1430    Lab Status: Preliminary result Specimen: Blood from Arm, Left Updated: 01/09/23 1515     Blood Culture No growth at 4 days    Blood Culture - Blood, Arm, Left [[id number]]  (Normal) Collected: 01/05/23 1430    Lab Status: Preliminary result Specimen: Blood from Arm, Left Updated: 01/09/23 1515     Blood Culture No growth at 4 days          Hospital Course:   Mr. Prater presented to the ER on 1/5/23 with weakness.  He met SIRS criteria.  He was started on Empiric Abx.  He was admitted to the hospital.      He had an elevated troponin.  Cardiology was consulted.  They felt this did not represent ACS.  He has not had chest pain.  He does have CKD.  He was also hypotensive on admission.      He had a loculated pleural effusion.  CT surgery was consulted.  They recommended conservative management.  They felt this did not represent an empyema.      He developed A-fib with RVR.  He was started on a Cardizem drip.  He is rate controlled.  He has been transitioned to PO rate control medications.  His HR remains  "well-controlled.  Dr. Lloyd discussed anticoagulation with him.  He has concerns about this as he has had issues with falls.  He declines to start anticoagulation at this time.    He has been weak.  He has worked with PT and OT.  We offered to arrange placement at a SNF for him so he could have ongoing therapy.  He declines this.  He wants to go home.  He declines home health.      He was previously on home oxygen.  He tells me he only uses it intermittently.  He is on less than his previous home oxygen.  We have qualified him for home oxygen again.    He is comfortable with being discharged and with the discharge plan.  He was given the chance to ask questions and all questions were answered to his satisfaction.            Physical Exam on Discharge:  /86 (BP Location: Right arm, Patient Position: Lying)   Pulse 91   Temp 98.4 °F (36.9 °C) (Oral)   Resp 16   Ht 162.6 cm (64\")   Wt 79 kg (174 lb 2 oz)   SpO2 92%   BMI 29.89 kg/m²   Physical Exam  Vitals reviewed.   Constitutional:       Appearance: He is well-developed.   HENT:      Head: Normocephalic and atraumatic.      Right Ear: External ear normal.      Left Ear: External ear normal.      Nose: Nose normal.   Eyes:      General: No scleral icterus.        Right eye: No discharge.         Left eye: No discharge.      Conjunctiva/sclera: Conjunctivae normal.      Pupils: Pupils are equal, round, and reactive to light.   Neck:      Thyroid: No thyromegaly.      Trachea: No tracheal deviation.   Cardiovascular:      Rate and Rhythm: Normal rate. Rhythm irregularly irregular.      Heart sounds: Normal heart sounds. No murmur heard.    No friction rub. No gallop.   Pulmonary:      Effort: Pulmonary effort is normal. No respiratory distress.      Breath sounds: Normal breath sounds. No stridor. No wheezing or rales.   Chest:      Chest wall: No tenderness.   Abdominal:      General: Bowel sounds are normal. There is no distension.      Palpations: " Abdomen is soft. There is no mass.      Tenderness: There is no abdominal tenderness. There is no guarding or rebound.      Hernia: No hernia is present.   Musculoskeletal:         General: No deformity. Normal range of motion.      Cervical back: Normal range of motion and neck supple.   Lymphadenopathy:      Cervical: No cervical adenopathy.   Skin:     General: Skin is warm and dry.      Coloration: Skin is not pale.      Findings: No erythema or rash.   Neurological:      Mental Status: He is alert and oriented to person, place, and time.      Cranial Nerves: No cranial nerve deficit.      Motor: No abnormal muscle tone.      Coordination: Coordination normal.      Deep Tendon Reflexes: Reflexes are normal and symmetric. Reflexes normal.   Psychiatric:         Behavior: Behavior normal.         Thought Content: Thought content normal.         Judgment: Judgment normal.           Condition on Discharge: Stable    Discharge Disposition:  Home or Self Care    Discharge Medications:     Discharge Medications      Changes to Medications      Instructions Start Date   metoprolol succinate  MG 24 hr tablet  Commonly known as: TOPROL-XL  What changed:   · medication strength  · how much to take   100 mg, Oral, Daily         Continue These Medications      Instructions Start Date   acetaminophen 500 MG tablet  Commonly known as: TYLENOL   1,000 mg, Oral, Nightly      furosemide 20 MG tablet  Commonly known as: LASIX   20 mg, Oral, 2 Times Daily      losartan 50 MG tablet  Commonly known as: COZAAR   50 mg, Oral, Daily      melatonin 5 MG tablet tablet   10 mg, Oral, Nightly      multivitamin with minerals tablet tablet   1 tablet, Oral, Daily      pantoprazole 40 MG EC tablet  Commonly known as: Protonix   40 mg, Oral, Daily      psyllium 58.6 % packet  Commonly known as: METAMUCIL   1 packet, Oral, Daily      tamsulosin 0.4 MG capsule 24 hr capsule  Commonly known as: FLOMAX   1 capsule, Oral, Nightly            Discharge Diet: heart healthy    Activity at Discharge: as tolerated    Follow-up Appointments:   No future appointments.    Test Results Pending at Discharge: n/a    Electronically signed by Kenneth Prater MD, 01/10/23, 12:23 CST.    Time: 32 minutes.

## 2023-01-10 NOTE — THERAPY DISCHARGE NOTE
Acute Care - Physical Therapy Discharge Summary  [facility]       Patient Name: Karoline Prater  : 1940  MRN: [id number]    Today's Date: 1/10/2023                 Admit Date: 2023      PT Recommendation and Plan    Visit Dx:    ICD-10-CM ICD-9-CM   1. NSTEMI (non-ST elevated myocardial infarction) (HCC)  I21.4 410.70   2. Chronic kidney disease, unspecified CKD stage  N18.9 585.9   3. Adenocarcinoma (HCC)  C80.1 199.1   4. Abdominal pain, unspecified abdominal location  R10.9 789.00   5. Impaired mobility  Z74.09 799.89   6. Hypoxia  R09.02 799.02        Outcome Measures     Row Name 01/10/23 0930 23 1032 23 1137       How much help from another person do you currently need...    Turning from your back to your side while in flat bed without using bedrails? 4  -TB 4  -TB 3  -MF    Moving from lying on back to sitting on the side of a flat bed without bedrails? 4  -TB 4  -TB 3  -MF    Moving to and from a bed to a chair (including a wheelchair)? 4  -TB 4  -TB 3  -MF    Standing up from a chair using your arms (e.g., wheelchair, bedside chair)? 4  -TB 4  -TB 3  -MF    Climbing 3-5 steps with a railing? 3  -TB 3  -TB 3  -MF    To walk in hospital room? 4  -TB 3  -TB 3  -MF    AM-PAC 6 Clicks Score (PT) 23  -TB 22  -TB 18  -MF       Functional Assessment    Outcome Measure Options AM-PAC 6 Clicks Basic Mobility (PT)  -TB AM-PAC 6 Clicks Basic Mobility (PT)  -TB AM-PAC 6 Clicks Basic Mobility (PT)  -MF          User Key  (r) = Recorded By, (t) = Taken By, (c) = Cosigned By    Initials Name Provider Type    Lucrecia Roblero, PTA Physical Therapist Assistant    TB Shital Bragg, PTA Physical Therapist Assistant                 PT Charges     Row Name 01/10/23 1145             Time Calculation    Start Time 906  -TB      Stop Time 930  -TB      Time Calculation (min) 24 min  -TB      PT Received On 01/10/23  -TB         Time Calculation- PT    Total Timed Code Minutes- PT 24  minute(s)  -TB            User Key  (r) = Recorded By, (t) = Taken By, (c) = Cosigned By    Initials Name Provider Type    TB Shital Bragg, PTA Physical Therapist Assistant                 PT Rehab Goals     Row Name 01/10/23 1438             Bed Mobility Goal 1 (PT)    Activity/Assistive Device (Bed Mobility Goal 1, PT) bed mobility activities, all  -NW      [place] Level/Cues Needed (Bed Mobility Goal 1, PT) independent  -NW      Time Frame (Bed Mobility Goal 1, PT) 10 days  -NW      Progress/Outcomes (Bed Mobility Goal 1, PT) goal met  -NW         Transfer Goal 1 (PT)    Activity/Assistive Device (Transfer Goal 1, PT) sit-to-stand/stand-to-sit  -NW      [place] Level/Cues Needed (Transfer Goal 1, PT) independent  -NW      Time Frame (Transfer Goal 1, PT) 10 days  -NW      Progress/Outcome (Transfer Goal 1, PT) goal not met  -NW         Gait Training Goal 1 (PT)    Activity/Assistive Device (Gait Training Goal 1, PT) gait (walking locomotion);decrease fall risk  -NW      [place] Level (Gait Training Goal 1, PT) independent  -NW      Distance (Gait Training Goal 1, PT) 250ft  -NW      Time Frame (Gait Training Goal 1, PT) 10 days  -NW      Progress/Outcome (Gait Training Goal 1, PT) goal not met  -NW         Stairs Goal 1 (PT)    Activity/Assistive Device (Stairs Goal 1, PT) ascending stairs;descending stairs  no handrails at home  -NW      [place] Level/Cues Needed (Stairs Goal 1, PT) independent  -NW      Number of Stairs (Stairs Goal 1, PT) 2  -NW      Time Frame (Stairs Goal 1, PT) 10 days  -NW      Progress/Outcome (Stairs Goal 1, PT) goal not met  -NW            User Key  (r) = Recorded By, (t) = Taken By, (c) = Cosigned By    Initials Name Provider Type Discipline    NW Sunni Medeiros, PTA Physical Therapist Assistant PT                    PT Discharge Summary  Anticipated Discharge Disposition (PT): home  Reason for Discharge: Discharge from facility  Outcomes Achieved: Refer  to plan of care for updates on goals achieved  Discharge Destination: Home      Sunni Medeiros, PTA   1/10/2023

## 2023-01-11 NOTE — OUTREACH NOTE
Prep Survey    Flowsheet Row Responses   [facility] facility patient discharged from? [place]   Is LACE score < 7 ? No   Eligibility Readm Mgmt   Discharge diagnosis Adenocarcinoma, lung    Does the patient have one of the following disease processes/diagnoses(primary or secondary)? Other   Does the patient have Home health ordered? No   Is there a DME ordered? Yes   What DME was ordered? Oxygen per [facility]   Prep survey completed? Yes          MARY KATE REED - Registered Nurse

## 2023-01-12 NOTE — THERAPY DISCHARGE NOTE
Acute Care - Occupational Therapy Discharge Summary  [facility]     Patient Name: Karoline Prater  : 1940  MRN: [id number]    Today's Date: 2023                 Admit Date: 2023        OT Recommendation and Plan    Visit Dx:    ICD-10-CM ICD-9-CM   1. NSTEMI (non-ST elevated myocardial infarction) (HCC)  I21.4 410.70   2. Chronic kidney disease, unspecified CKD stage  N18.9 585.9   3. Adenocarcinoma (HCC)  C80.1 199.1   4. Abdominal pain, unspecified abdominal location  R10.9 789.00   5. Impaired mobility  Z74.09 799.89   6. Hypoxia  R09.02 799.02                OT Rehab Goals     Row Name 23 0700             Bathing Goal 1 (OT)    Activity/Device (Bathing Goal 1, OT) bathing skills, all  -TS      [place] Level/Cues Needed (Bathing Goal 1, OT) contact guard required  -TS      Time Frame (Bathing Goal 1, OT) long term goal (LTG);by discharge  -TS      Progress/Outcomes (Bathing Goal 1, OT) goal not met  -TS         Dressing Goal 1 (OT)    Activity/Device (Dressing Goal 1, OT) dressing skills, all  -TS      [place]/Cues Needed (Dressing Goal 1, OT) set-up required  -TS      Time Frame (Dressing Goal 1, OT) long term goal (LTG);by discharge  -TS      Progress/Outcome (Dressing Goal 1, OT) goal not met  -TS         Toileting Goal 1 (OT)    Activity/Device (Toileting Goal 1, OT) toileting skills, all;commode  -TS      [place] Level/Cues Needed (Toileting Goal 1, OT) supervision required  -TS      Time Frame (Toileting Goal 1, OT) long term goal (LTG);by discharge  -TS      Progress/Outcome (Toileting Goal 1, OT) goal not met  -TS            User Key  (r) = Recorded By, (t) = Taken By, (c) = Cosigned By    Initials Name Provider Type Discipline    TS Lucia Connor COTA Occupational Therapist Assistant OT                 Outcome Measures     Row Name 01/10/23 0930 23 1032          How much help from another person do you currently need...    Turning from your back to  your side while in flat bed without using bedrails? 4  -TB 4  -TB     Moving from lying on back to sitting on the side of a flat bed without bedrails? 4  -TB 4  -TB     Moving to and from a bed to a chair (including a wheelchair)? 4  -TB 4  -TB     Standing up from a chair using your arms (e.g., wheelchair, bedside chair)? 4  -TB 4  -TB     Climbing 3-5 steps with a railing? 3  -TB 3  -TB     To walk in hospital room? 4  -TB 3  -TB     AM-PAC 6 Clicks Score (PT) 23  -TB 22  -TB        Functional Assessment    Outcome Measure Options AM-PAC 6 Clicks Basic Mobility (PT)  -TB AM-PAC 6 Clicks Basic Mobility (PT)  -TB           User Key  (r) = Recorded By, (t) = Taken By, (c) = Cosigned By    Initials Name Provider Type    TB Shital Bragg, PTA Physical Therapist Assistant                Timed Therapy Charges  Total Units: 2    Suggested Charges  Total Units: 2    Procedure Name Documented Minutes Units Code    HC OT THER PROC EA 15 MIN 28  2    62601 (CPT®)               Documented Minutes  Total Minutes: 28    Therapy Provided Minutes    23443 - OT Therapeutic Exercise Minutes 28                    OT Discharge Summary  Anticipated Discharge Disposition (OT): home with assist  Reason for Discharge: Discharge from facility  Outcomes Achieved: Refer to plan of care for updates on goals achieved  Discharge Destination: Home with assist      VIKRAM Roberts  1/12/2023

## 2023-01-13 ENCOUNTER — READMISSION MANAGEMENT (OUTPATIENT)
Dept: CALL CENTER | Facility: HOSPITAL | Age: 83
End: 2023-01-13
Payer: MEDICARE

## 2023-01-13 NOTE — OUTREACH NOTE
Medical Week 1 Survey    Flowsheet Row Responses   [facility] patient discharged from? [place]   Does the patient have one of the following disease processes/diagnoses(primary or secondary)? Other   Week 1 attempt successful? Yes   Call start time 1618   Call end time 1624   Discharge diagnosis Adenocarcinoma, lung    Meds reviewed with patient/caregiver? Yes   Is the patient having any side effects they believe may be caused by any medication additions or changes? No   Does the patient have all medications ordered at discharge? Yes   Is the patient taking all medications as directed (includes completed medication regime)? Yes   Does the patient have a primary care provider?  Yes   Does the patient have an appointment with their PCP within 7 days of discharge? Yes   Has the patient kept scheduled appointments due by today? N/A   What DME was ordered? Oxygen per [facility]   Has all DME been delivered? Yes   DME comments wearing home O2 @ 2.5L,  no pulse ox.   Psychosocial issues? No   Comments Pt reports that he is doing better. No BP monitors. Eating, sleeping and drinking wellUsing walker for ambulation, no falls since DC. Denies nausea, chills & urine output is WNL.   Did the patient receive a copy of their discharge instructions? Yes   Nursing interventions Reviewed instructions with patient   What is the patient's perception of their health status since discharge? Improving   Is the patient/caregiver able to teach back signs and symptoms related to disease process for when to call PCP? Yes   Is the patient/caregiver able to teach back the hierarchy of who to call/visit for symptoms/problems? PCP, Specialist, Home health nurse, Urgent Care, ED, 911 Yes   Week 1 call completed? Yes   Is the patient interested in additional calls from an ambulatory ?  NOTE:  applies to high risk patients requiring additional follow-up. No          TEODORA KELLER - Registered Nurse

## 2023-03-21 ENCOUNTER — OFFICE VISIT (OUTPATIENT)
Dept: CARDIOLOGY CLINIC | Age: 83
End: 2023-03-21
Payer: MEDICARE

## 2023-03-21 VITALS
HEIGHT: 64 IN | DIASTOLIC BLOOD PRESSURE: 82 MMHG | WEIGHT: 173 LBS | HEART RATE: 86 BPM | SYSTOLIC BLOOD PRESSURE: 130 MMHG | OXYGEN SATURATION: 92 % | BODY MASS INDEX: 29.53 KG/M2

## 2023-03-21 DIAGNOSIS — I48.0 PAF (PAROXYSMAL ATRIAL FIBRILLATION) (HCC): ICD-10-CM

## 2023-03-21 DIAGNOSIS — I42.8 NONISCHEMIC CARDIOMYOPATHY (HCC): ICD-10-CM

## 2023-03-21 DIAGNOSIS — I47.29 NSVT (NONSUSTAINED VENTRICULAR TACHYCARDIA) (HCC): ICD-10-CM

## 2023-03-21 DIAGNOSIS — I10 ESSENTIAL HYPERTENSION: Primary | ICD-10-CM

## 2023-03-21 PROCEDURE — 3075F SYST BP GE 130 - 139MM HG: CPT | Performed by: NURSE PRACTITIONER

## 2023-03-21 PROCEDURE — G8427 DOCREV CUR MEDS BY ELIG CLIN: HCPCS | Performed by: NURSE PRACTITIONER

## 2023-03-21 PROCEDURE — 3079F DIAST BP 80-89 MM HG: CPT | Performed by: NURSE PRACTITIONER

## 2023-03-21 PROCEDURE — 93000 ELECTROCARDIOGRAM COMPLETE: CPT | Performed by: NURSE PRACTITIONER

## 2023-03-21 PROCEDURE — 1036F TOBACCO NON-USER: CPT | Performed by: NURSE PRACTITIONER

## 2023-03-21 PROCEDURE — G8417 CALC BMI ABV UP PARAM F/U: HCPCS | Performed by: NURSE PRACTITIONER

## 2023-03-21 PROCEDURE — 1123F ACP DISCUSS/DSCN MKR DOCD: CPT | Performed by: NURSE PRACTITIONER

## 2023-03-21 PROCEDURE — 99214 OFFICE O/P EST MOD 30 MIN: CPT | Performed by: NURSE PRACTITIONER

## 2023-03-21 PROCEDURE — G8484 FLU IMMUNIZE NO ADMIN: HCPCS | Performed by: NURSE PRACTITIONER

## 2023-03-21 RX ORDER — METOPROLOL SUCCINATE 25 MG/1
25 TABLET, EXTENDED RELEASE ORAL DAILY
Qty: 90 TABLET | Refills: 3 | Status: SHIPPED | OUTPATIENT
Start: 2023-03-21

## 2023-03-21 RX ORDER — METOPROLOL SUCCINATE 100 MG/1
100 TABLET, EXTENDED RELEASE ORAL DAILY
COMMUNITY
End: 2023-03-21

## 2023-03-21 ASSESSMENT — ENCOUNTER SYMPTOMS
CHEST TIGHTNESS: 0
COUGH: 0
SORE THROAT: 0
SHORTNESS OF BREATH: 1
WHEEZING: 0

## 2023-03-21 NOTE — PROGRESS NOTES
[facility] Cardiology   Established Patient Office Visit   Dang Vera. [address] [facility]  [phone]        OFFICE VISIT:  3/21/2023    Lemuel Post - : 1940    Reason For Visit:  Danielle Reis is a [de-identified] y.o. male who is here for Follow-up    1. Essential hypertension    2. NSVT (nonsustained ventricular tachycardia)    3. Nonischemic cardiomyopathy ([facility]    4. 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 Patient was seen in our office on 2021 with an elevated blood pressure. Patient was given digital blood pressure cuff and was instructed to keep home blood pressure logs. Patient was started on losartan 50 mg by his kidney doctor. In addition to being on the Toprol-XL 12.5 mg daily. Patient presented to clinic on 2021 for blood pressure evaluation. Patient denied any complaints. Blood pressure well controlled then 118/80. Patient had an ER encounter at [facility] on 2023 with complaints of weakness. Impression:   Extensive chronic pulmonary fibrosis which appears stable,  no definite superimposed acute infiltrate is identified. Chronic pleural  thickening and/or small right pleural effusion. Stable satisfactory  position of the right-sided port catheter. This report was finalized on 2023 14:19 by Dr. Kiel Rios MD.     Patient met SIRS criteria. He was started on empiric antibiotics. Elevated troponins. Cardiology was consulted. They felt that it did not represent ACS. He did not have any chest pain. He does have a history of chronic kidney disease. He did have a loculated pleural effusion. CT surgery was consulted. Recommended conservative management. He did develop A-fib with RVR. He was started on a Cardizem drip.

## 2023-04-18 ENCOUNTER — OFFICE VISIT (OUTPATIENT)
Dept: CARDIOLOGY CLINIC | Age: 83
End: 2023-04-18
Payer: MEDICARE

## 2023-04-18 VITALS
WEIGHT: 177 LBS | SYSTOLIC BLOOD PRESSURE: 124 MMHG | DIASTOLIC BLOOD PRESSURE: 84 MMHG | OXYGEN SATURATION: 92 % | HEART RATE: 81 BPM | BODY MASS INDEX: 30.22 KG/M2 | HEIGHT: 64 IN

## 2023-04-18 DIAGNOSIS — I47.29 NSVT (NONSUSTAINED VENTRICULAR TACHYCARDIA) (HCC): ICD-10-CM

## 2023-04-18 DIAGNOSIS — I42.8 NONISCHEMIC CARDIOMYOPATHY (HCC): Primary | ICD-10-CM

## 2023-04-18 DIAGNOSIS — I10 ESSENTIAL HYPERTENSION: ICD-10-CM

## 2023-04-18 PROCEDURE — 1036F TOBACCO NON-USER: CPT | Performed by: NURSE PRACTITIONER

## 2023-04-18 PROCEDURE — G8417 CALC BMI ABV UP PARAM F/U: HCPCS | Performed by: NURSE PRACTITIONER

## 2023-04-18 PROCEDURE — 1123F ACP DISCUSS/DSCN MKR DOCD: CPT | Performed by: NURSE PRACTITIONER

## 2023-04-18 PROCEDURE — 3074F SYST BP LT 130 MM HG: CPT | Performed by: NURSE PRACTITIONER

## 2023-04-18 PROCEDURE — 99214 OFFICE O/P EST MOD 30 MIN: CPT | Performed by: NURSE PRACTITIONER

## 2023-04-18 PROCEDURE — G8427 DOCREV CUR MEDS BY ELIG CLIN: HCPCS | Performed by: NURSE PRACTITIONER

## 2023-04-18 PROCEDURE — 3079F DIAST BP 80-89 MM HG: CPT | Performed by: NURSE PRACTITIONER

## 2023-04-18 ASSESSMENT — ENCOUNTER SYMPTOMS
CHEST TIGHTNESS: 0
SHORTNESS OF BREATH: 0
WHEEZING: 0
SORE THROAT: 0
COUGH: 0

## 2023-04-18 NOTE — PROGRESS NOTES
extended release tablet Take 1 tablet by mouth daily 90 tablet 3    furosemide (LASIX) 20 MG tablet Take 1 tablet by mouth daily      spironolactone (ALDACTONE) 25 MG tablet Take 1 tablet by mouth 2 times daily      losartan (COZAAR) 50 MG tablet Take 1 tablet by mouth daily      melatonin 3 MG TABS tablet Take 10 mg by mouth daily      acetaminophen (TYLENOL) 500 MG tablet Take 1 tablet by mouth 2 times daily      sodium bicarbonate 650 MG tablet Take 1 tablet by mouth 2 times daily      pantoprazole (PROTONIX) 40 MG tablet Take 1 tablet by mouth daily      tamsulosin (FLOMAX) 0.4 MG capsule Take 1 capsule by mouth daily      Multiple Vitamin (MULTI VITAMIN DAILY PO) Take by mouth daily        No current facility-administered medications for this visit. Allergies: Penicillins  Past Medical History:   Diagnosis Date    Abnormal weight loss     Anemia     Anemia, unspecified     Blind left eye     Cardiomyopathy ([facility]     with compensated CHF    Cellulitis     Cellulitis of unspecified part of limb     Chronic kidney disease, stage IV (severe) ([facility]     Dr. Sergey Carrasco    COPD (chronic obstructive pulmonary disease) ([facility]     Duodenal ulcer     Encounter for central line care 3/16/2022    Essential hypertension 10/2/2017    Eye injury     at age 12 from penetrating injury    Gout     HTN (hypertension)     Hydronephrosis     Hydronephrosis, left     Liver abscess     resolved    Lung nodule     Malignant neoplasm ([facility]     Malignant neoplasm of upper lobe, right bronchus or lung (HCC)     MGUS (monoclonal gammopathy of unknown significance)     secondary to an IgG kappa.   IgG level in 2,000 range    Monoclonal gammopathy     NICM (nonischemic cardiomyopathy) (HCC)     Non-small cell lung cancer ([facility] 6/4/2019    Non-sustained ventricular tachycardia (HCC)     NSVT (nonsustained ventricular tachycardia) (HCC)     Osteoarthritis     Other fatigue     Other iron deficiency anemias     Secondary malignant neoplasm of other

## 2023-04-19 ENCOUNTER — HOSPITAL ENCOUNTER (OUTPATIENT)
Dept: INFUSION THERAPY | Age: 83
Discharge: HOME | End: 2023-04-19
Payer: MEDICARE

## 2023-04-19 ENCOUNTER — OFFICE VISIT (OUTPATIENT)
Dept: HEMATOLOGY | Age: 83
End: 2023-04-19
Payer: MEDICARE

## 2023-04-19 VITALS
OXYGEN SATURATION: 94 % | SYSTOLIC BLOOD PRESSURE: 120 MMHG | WEIGHT: 175.5 LBS | BODY MASS INDEX: 30.12 KG/M2 | DIASTOLIC BLOOD PRESSURE: 76 MMHG | HEART RATE: 87 BPM

## 2023-04-19 DIAGNOSIS — N18.4 CHRONIC KIDNEY DISEASE, STAGE IV (SEVERE) (HCC): ICD-10-CM

## 2023-04-19 DIAGNOSIS — C34.11 PRIMARY ADENOCARCINOMA OF UPPER LOBE OF RIGHT LUNG (HCC): Primary | ICD-10-CM

## 2023-04-19 DIAGNOSIS — Z00.00 HEALTH CARE MAINTENANCE: ICD-10-CM

## 2023-04-19 DIAGNOSIS — C34.90 NON-SMALL CELL LUNG CANCER, UNSPECIFIED LATERALITY (HCC): ICD-10-CM

## 2023-04-19 LAB
BASOPHILS # BLD: 0.05 K/UL (ref 0.01–0.08)
BASOPHILS NFR BLD: 0.8 % (ref 0.1–1.2)
EOSINOPHIL # BLD: 0.22 K/UL (ref 0.04–0.54)
EOSINOPHIL NFR BLD: 3.3 % (ref 0.7–7)
ERYTHROCYTE [DISTWIDTH] IN BLOOD BY AUTOMATED COUNT: 16.6 % (ref 11.6–14.4)
HCT VFR BLD AUTO: 46.7 % (ref 40.1–51)
HGB BLD-MCNC: 14 G/DL (ref 13.7–17.5)
LYMPHOCYTES # BLD: 1.13 K/UL (ref 1.18–3.74)
LYMPHOCYTES NFR BLD: 17 % (ref 19.3–53.1)
MCH RBC QN AUTO: 28.3 PG (ref 25.7–32.2)
MCHC RBC AUTO-ENTMCNC: 30 G/DL (ref 32.3–36.5)
MCV RBC AUTO: 94.5 FL (ref 79–92.2)
MONOCYTES # BLD: 0.66 K/UL (ref 0.24–0.82)
MONOCYTES NFR BLD: 9.9 % (ref 4.7–12.5)
NEUTROPHILS # BLD: 4.59 K/UL (ref 1.56–6.13)
NEUTS SEG NFR BLD: 68.8 % (ref 34–71.1)
PLATELET # BLD AUTO: 132 K/UL (ref 163–337)
PMV BLD AUTO: 12 FL (ref 7.4–10.4)
RBC # BLD AUTO: 4.94 M/UL (ref 4.63–6.08)
WBC # BLD AUTO: 6.66 K/UL (ref 4.23–9.07)

## 2023-04-19 PROCEDURE — 3074F SYST BP LT 130 MM HG: CPT | Performed by: INTERNAL MEDICINE

## 2023-04-19 PROCEDURE — 1123F ACP DISCUSS/DSCN MKR DOCD: CPT | Performed by: INTERNAL MEDICINE

## 2023-04-19 PROCEDURE — G8417 CALC BMI ABV UP PARAM F/U: HCPCS | Performed by: INTERNAL MEDICINE

## 2023-04-19 PROCEDURE — 99211 OFF/OP EST MAY X REQ PHY/QHP: CPT

## 2023-04-19 PROCEDURE — 85025 COMPLETE CBC W/AUTO DIFF WBC: CPT

## 2023-04-19 PROCEDURE — 1036F TOBACCO NON-USER: CPT | Performed by: INTERNAL MEDICINE

## 2023-04-19 PROCEDURE — 3078F DIAST BP <80 MM HG: CPT | Performed by: INTERNAL MEDICINE

## 2023-04-19 PROCEDURE — 36415 COLL VENOUS BLD VENIPUNCTURE: CPT

## 2023-04-19 PROCEDURE — 99213 OFFICE O/P EST LOW 20 MIN: CPT | Performed by: INTERNAL MEDICINE

## 2023-04-19 PROCEDURE — G8427 DOCREV CUR MEDS BY ELIG CLIN: HCPCS | Performed by: INTERNAL MEDICINE

## 2023-08-09 ENCOUNTER — HOSPITAL ENCOUNTER (OUTPATIENT)
Dept: INFUSION THERAPY | Age: 83
Discharge: HOME | End: 2023-08-09
Payer: MEDICARE

## 2023-08-09 ENCOUNTER — TRANSCRIBE ORDERS (OUTPATIENT)
Dept: ADMINISTRATIVE | Facility: HOSPITAL | Age: 83
End: 2023-08-09
Payer: MEDICARE

## 2023-08-09 ENCOUNTER — OFFICE VISIT (OUTPATIENT)
Dept: HEMATOLOGY | Age: 83
End: 2023-08-09
Payer: MEDICARE

## 2023-08-09 VITALS
WEIGHT: 174.1 LBS | BODY MASS INDEX: 29.88 KG/M2 | OXYGEN SATURATION: 95 % | SYSTOLIC BLOOD PRESSURE: 120 MMHG | DIASTOLIC BLOOD PRESSURE: 70 MMHG | HEART RATE: 82 BPM

## 2023-08-09 DIAGNOSIS — R91.1 LUNG NODULE: ICD-10-CM

## 2023-08-09 DIAGNOSIS — C34.11 PRIMARY ADENOCARCINOMA OF UPPER LOBE OF RIGHT LUNG: Primary | ICD-10-CM

## 2023-08-09 DIAGNOSIS — C34.11 PRIMARY ADENOCARCINOMA OF UPPER LOBE OF RIGHT LUNG (HCC): Primary | ICD-10-CM

## 2023-08-09 DIAGNOSIS — C34.91 NON-SMALL CELL CANCER OF RIGHT LUNG (HCC): ICD-10-CM

## 2023-08-09 DIAGNOSIS — C34.11 PRIMARY ADENOCARCINOMA OF UPPER LOBE OF RIGHT LUNG (HCC): ICD-10-CM

## 2023-08-09 DIAGNOSIS — N28.1 RENAL CYST: ICD-10-CM

## 2023-08-09 DIAGNOSIS — R59.1 LYMPHADENOPATHY: ICD-10-CM

## 2023-08-09 LAB
BASOPHILS # BLD: 0.03 K/UL (ref 0.01–0.08)
BASOPHILS NFR BLD: 0.5 % (ref 0.1–1.2)
EOSINOPHIL # BLD: 0.19 K/UL (ref 0.04–0.54)
EOSINOPHIL NFR BLD: 3.1 % (ref 0.7–7)
ERYTHROCYTE [DISTWIDTH] IN BLOOD BY AUTOMATED COUNT: 15.4 % (ref 11.6–14.4)
HCT VFR BLD AUTO: 43.3 % (ref 40.1–51)
HGB BLD-MCNC: 14.3 G/DL (ref 13.7–17.5)
LYMPHOCYTES # BLD: 1.06 K/UL (ref 1.18–3.74)
LYMPHOCYTES NFR BLD: 17.5 % (ref 19.3–53.1)
MCH RBC QN AUTO: 29.4 PG (ref 25.7–32.2)
MCHC RBC AUTO-ENTMCNC: 33 G/DL (ref 32.3–36.5)
MCV RBC AUTO: 89.1 FL (ref 79–92.2)
MONOCYTES # BLD: 0.73 K/UL (ref 0.24–0.82)
MONOCYTES NFR BLD: 12 % (ref 4.7–12.5)
NEUTROPHILS # BLD: 4 K/UL (ref 1.56–6.13)
NEUTS SEG NFR BLD: 65.9 % (ref 34–71.1)
PLATELET # BLD AUTO: 171 K/UL (ref 163–337)
PMV BLD AUTO: 11.4 FL (ref 7.4–10.4)
RBC # BLD AUTO: 4.86 M/UL (ref 4.63–6.08)
WBC # BLD AUTO: 6.07 K/UL (ref 4.23–9.07)

## 2023-08-09 PROCEDURE — G8427 DOCREV CUR MEDS BY ELIG CLIN: HCPCS | Performed by: INTERNAL MEDICINE

## 2023-08-09 PROCEDURE — 85025 COMPLETE CBC W/AUTO DIFF WBC: CPT

## 2023-08-09 PROCEDURE — 1123F ACP DISCUSS/DSCN MKR DOCD: CPT | Performed by: INTERNAL MEDICINE

## 2023-08-09 PROCEDURE — 3078F DIAST BP <80 MM HG: CPT | Performed by: INTERNAL MEDICINE

## 2023-08-09 PROCEDURE — G8417 CALC BMI ABV UP PARAM F/U: HCPCS | Performed by: INTERNAL MEDICINE

## 2023-08-09 PROCEDURE — 99212 OFFICE O/P EST SF 10 MIN: CPT

## 2023-08-09 PROCEDURE — 99213 OFFICE O/P EST LOW 20 MIN: CPT | Performed by: INTERNAL MEDICINE

## 2023-08-09 PROCEDURE — 1036F TOBACCO NON-USER: CPT | Performed by: INTERNAL MEDICINE

## 2023-08-09 PROCEDURE — 3074F SYST BP LT 130 MM HG: CPT | Performed by: INTERNAL MEDICINE

## 2023-08-09 PROCEDURE — 36415 COLL VENOUS BLD VENIPUNCTURE: CPT

## 2023-08-09 NOTE — PROGRESS NOTES
nitish.    Mr. Donna Foster requests that imaging studies be done yearly. Plan:  CT scans of the chest abdomen and pelvis are scheduled for November 2023 when he comes back in follow-up  I will see Mr. Donna Foster back in follow-up in 3 months. #2   Anemia of chronic renal failure and chemotherapy associated anemia    Procrit 20,000 units weekly for Hgb < 11.0  is given as indicated to decrease transfusion requirements. I suspect he improved performance status is associated with his improved hemoglobin causing him less dyspnea on exertion and giving him a bit more stamina. Being off of chemotherapy has been very beneficial to him. CBC today 4/19/2023 reveals a WBC of 6.66. Hgb is 14.0 with an MCV of 94.5 and platelet count of 853,745. #3  TUMOR SCREENING AND HEALTH MAINTENANCE    GI cancer screening  Colonoscopy on 1/13/2019 by Dr. Jaye Rouse. Recall in 5 years    Prostate cancer screening    #4  Immunizations:  Immunization History   Administered Date(s) Administered    COVID-19, MODERNA BLUE border, Primary or Immunocompromised, (age 12y+), IM, 100 mcg/0.5mL 04/05/2021, 05/03/2021                     Osiris December was seen today for cancer and follow-up. Diagnoses and all orders for this visit:    Primary adenocarcinoma of upper lobe of right lung ([address])  -     CBC with Auto Differential; Future  -     CT CHEST W CONTRAST; Future  -     CT ABDOMEN PELVIS W IV CONTRAST Additional Contrast? Oral; Future    Lung nodule  -     CBC with Auto Differential; Future  -     CT CHEST W CONTRAST; Future  -     CT ABDOMEN PELVIS W IV CONTRAST Additional Contrast? Oral; Future    Lymphadenopathy  -     CBC with Auto Differential; Future  -     CT CHEST W CONTRAST; Future  -     CT ABDOMEN PELVIS W IV CONTRAST Additional Contrast? Oral; Future    Non-small cell cancer of right lung (HCC)  -     CBC with Auto Differential; Future  -     CT CHEST W CONTRAST;  Future  -     CT ABDOMEN PELVIS W IV CONTRAST Additional

## 2023-10-18 ENCOUNTER — OFFICE VISIT (OUTPATIENT)
Dept: CARDIOLOGY CLINIC | Age: 83
End: 2023-10-18
Payer: MEDICARE

## 2023-10-18 VITALS
DIASTOLIC BLOOD PRESSURE: 86 MMHG | BODY MASS INDEX: 30.39 KG/M2 | WEIGHT: 178 LBS | OXYGEN SATURATION: 95 % | SYSTOLIC BLOOD PRESSURE: 124 MMHG | HEIGHT: 64 IN | HEART RATE: 83 BPM

## 2023-10-18 DIAGNOSIS — I42.8 NONISCHEMIC CARDIOMYOPATHY (HCC): Primary | ICD-10-CM

## 2023-10-18 DIAGNOSIS — R06.02 SHORTNESS OF BREATH: ICD-10-CM

## 2023-10-18 DIAGNOSIS — I10 ESSENTIAL HYPERTENSION: ICD-10-CM

## 2023-10-18 PROCEDURE — 99214 OFFICE O/P EST MOD 30 MIN: CPT | Performed by: NURSE PRACTITIONER

## 2023-10-18 PROCEDURE — G8484 FLU IMMUNIZE NO ADMIN: HCPCS | Performed by: NURSE PRACTITIONER

## 2023-10-18 PROCEDURE — 1036F TOBACCO NON-USER: CPT | Performed by: NURSE PRACTITIONER

## 2023-10-18 PROCEDURE — G8417 CALC BMI ABV UP PARAM F/U: HCPCS | Performed by: NURSE PRACTITIONER

## 2023-10-18 PROCEDURE — 3074F SYST BP LT 130 MM HG: CPT | Performed by: NURSE PRACTITIONER

## 2023-10-18 PROCEDURE — 3079F DIAST BP 80-89 MM HG: CPT | Performed by: NURSE PRACTITIONER

## 2023-10-18 PROCEDURE — G8427 DOCREV CUR MEDS BY ELIG CLIN: HCPCS | Performed by: NURSE PRACTITIONER

## 2023-10-18 PROCEDURE — 1123F ACP DISCUSS/DSCN MKR DOCD: CPT | Performed by: NURSE PRACTITIONER

## 2023-10-18 ASSESSMENT — ENCOUNTER SYMPTOMS
COUGH: 0
WHEEZING: 0
SORE THROAT: 0
CHEST TIGHTNESS: 0
SHORTNESS OF BREATH: 1

## 2023-10-18 NOTE — PATIENT INSTRUCTIONS
[place] at the [address] and [address] located on the first floor of [address] [place] [address] through hospital main entrance and turn immediately to your left. Date/Time:     Patient's contact number:  [phone] (home)     Echocardiogram -  No prep. Takes approximately 30 min. An echocardiogram uses sound waves to produce images of your heart. This commonly used test allows your doctor to see how your heart is beating and pumping blood. Your doctor can use the images from an echocardiogram to identify various abnormalities in the heart muscle and valves. This test has 2 parts: You will be asked to disrobe from the waist up and given a gown to wear. The technologist will then hook up an EKG monitor to you for the entire exam.   You will then have an ultrasound of your heart (echocardiogram) to assess the heart muscle, heart valves and heart function. You may eat and take any medicines before the exam.     If you need to change your appointment, please call outpatient scheduling at [phone].

## 2023-10-18 NOTE — PROGRESS NOTES
participate in his cardiovascular care. ALEX Reina NP  10/18/2023 9:53 AM CDT                    This dictation was generated by voice recognition computer software. Although all attempts are made to edit dictation for accuracy, there may be errors in the transcription that are not intended.

## 2023-10-24 ENCOUNTER — HOSPITAL ENCOUNTER (OUTPATIENT)
Dept: NON INVASIVE DIAGNOSTICS | Age: 83
Discharge: HOME | End: 2023-10-24
Payer: MEDICARE

## 2023-10-24 PROCEDURE — 93306 TTE W/DOPPLER COMPLETE: CPT

## 2023-10-25 ENCOUNTER — TELEPHONE (OUTPATIENT)
Dept: CARDIOLOGY CLINIC | Age: 83
End: 2023-10-25

## 2023-10-26 ENCOUNTER — TELEPHONE (OUTPATIENT)
Dept: CARDIOLOGY CLINIC | Age: 83
End: 2023-10-26

## 2023-10-27 ENCOUNTER — TELEPHONE (OUTPATIENT)
Dept: CARDIOLOGY CLINIC | Age: 83
End: 2023-10-27

## 2023-11-01 ENCOUNTER — OFFICE VISIT (OUTPATIENT)
Dept: CARDIOLOGY CLINIC | Age: 83
End: 2023-11-01
Payer: MEDICARE

## 2023-11-01 VITALS
BODY MASS INDEX: 30.22 KG/M2 | DIASTOLIC BLOOD PRESSURE: 84 MMHG | WEIGHT: 177 LBS | SYSTOLIC BLOOD PRESSURE: 124 MMHG | HEIGHT: 64 IN | HEART RATE: 91 BPM | OXYGEN SATURATION: 94 %

## 2023-11-01 DIAGNOSIS — I42.8 NONISCHEMIC CARDIOMYOPATHY (HCC): Primary | ICD-10-CM

## 2023-11-01 DIAGNOSIS — I10 ESSENTIAL HYPERTENSION: ICD-10-CM

## 2023-11-01 PROCEDURE — G8484 FLU IMMUNIZE NO ADMIN: HCPCS | Performed by: NURSE PRACTITIONER

## 2023-11-01 PROCEDURE — 3079F DIAST BP 80-89 MM HG: CPT | Performed by: NURSE PRACTITIONER

## 2023-11-01 PROCEDURE — 99214 OFFICE O/P EST MOD 30 MIN: CPT | Performed by: NURSE PRACTITIONER

## 2023-11-01 PROCEDURE — 3074F SYST BP LT 130 MM HG: CPT | Performed by: NURSE PRACTITIONER

## 2023-11-01 PROCEDURE — 1036F TOBACCO NON-USER: CPT | Performed by: NURSE PRACTITIONER

## 2023-11-01 PROCEDURE — G8417 CALC BMI ABV UP PARAM F/U: HCPCS | Performed by: NURSE PRACTITIONER

## 2023-11-01 PROCEDURE — 1123F ACP DISCUSS/DSCN MKR DOCD: CPT | Performed by: NURSE PRACTITIONER

## 2023-11-01 PROCEDURE — G8427 DOCREV CUR MEDS BY ELIG CLIN: HCPCS | Performed by: NURSE PRACTITIONER

## 2023-11-01 ASSESSMENT — ENCOUNTER SYMPTOMS
CHEST TIGHTNESS: 0
COUGH: 0
SHORTNESS OF BREATH: 1
WHEEZING: 0
SORE THROAT: 0

## 2023-11-01 NOTE — PROGRESS NOTES
[facility] Cardiology   Established Patient Office Visit  [address] [place] [address]  [phone]        OFFICE VISIT:  2023    Karl Dose - : 1940    Reason For Visit:  Delfina Lorenzo is a 82 y.o. male who is here for Follow-up and Cardiomyopathy    1. Nonischemic cardiomyopathy ([address])    2. 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 Patient had an ER encounter at [facility] on 2023 with complaints of weakness. Impression:   Extensive chronic pulmonary fibrosis which appears stable,  no definite superimposed acute infiltrate is identified. Chronic pleural  thickening and/or small right pleural effusion. Stable satisfactory  position of the right-sided port catheter. This report was finalized on 2023 14:19 by Dr. Cate Avila MD.      Patient met SIRS criteria. He was started on empiric antibiotics. Elevated troponins. Cardiology was consulted. They felt that it did not represent ACS. He did not have any chest pain. He does have a history of chronic kidney disease. He did have a loculated pleural effusion. CT surgery was consulted. Recommended conservative management. He did develop A-fib with RVR. He was started on a Cardizem drip. He was transition to oral beta-blocker going from a Toprol-XL 12.5 to 100 mg tablet daily. They did discuss anticoagulation. Patient declined to start anticoagulation at this point due to concerns with falls. Patient was seen in our office on 3/21/2023 for that hospital follow-up. Patient's complaint was he felt very fatigued being on the Toprol- mg daily. I did change him back to Toprol-XL 12.5 mg daily. Appt 2023  stating he was feeling much better on the lower dose of beta-blocker.   Had no

## 2023-11-08 ENCOUNTER — HOSPITAL ENCOUNTER (OUTPATIENT)
Dept: CT IMAGING | Age: 83
Discharge: HOME | End: 2023-11-08
Payer: MEDICARE

## 2023-11-08 DIAGNOSIS — R59.1 LYMPHADENOPATHY: ICD-10-CM

## 2023-11-08 DIAGNOSIS — C34.91 NON-SMALL CELL CANCER OF RIGHT LUNG (HCC): ICD-10-CM

## 2023-11-08 DIAGNOSIS — R91.1 LUNG NODULE: ICD-10-CM

## 2023-11-08 DIAGNOSIS — N28.1 RENAL CYST: ICD-10-CM

## 2023-11-08 DIAGNOSIS — C34.11 PRIMARY ADENOCARCINOMA OF UPPER LOBE OF RIGHT LUNG (HCC): ICD-10-CM

## 2023-11-08 LAB — CREAT SERPL-MCNC: 2.6 MG/DL (ref 0.3–1.3)

## 2023-11-08 PROCEDURE — 74176 CT ABD & PELVIS W/O CONTRAST: CPT

## 2023-11-08 PROCEDURE — 82565 ASSAY OF CREATININE: CPT

## 2023-11-08 PROCEDURE — 71250 CT THORAX DX C-: CPT

## 2023-11-09 ENCOUNTER — TELEPHONE (OUTPATIENT)
Dept: HEMATOLOGY | Age: 83
End: 2023-11-09

## 2023-11-09 NOTE — PROGRESS NOTES
Patient:  Marsh Dandy  YOB: 1940  Date of Service: 11/13/2023  MRN: [id number]    Primary Care Physician: Pavithra Duron MD    Chief Complaint   Patient presents with    Follow-up     Stage IV metastatic adenocarcinoma of the RUL of the lung to the peripancreatic region       Other     CKD    Results     CT imaging Chest, Abd & Pelvis at [address] 11/8/23           Patient Seen, Chart, Consults notes, Labs, Radiology studies reviewed. Subjective:    Marsh Dandy is a 82 y.o.  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 CKD and chemotherapy associated ANEMIA, previously on Procrit, currently on hold having completed chemotherapy with further improvement in hemoglobin. Pancreatic mass diagnosed 10/29/03 negative on open biopsy  BPH on Flomax  Orthostatic hypotension medications managed by Dr. Carrol Keys completed 4 cycles of combination chemotherapy with carboplatin, Keytruda, Alimta on 7/5/2019. Maintenance Keytruda and Alimta every 3 weeks was delivered. The final cycle number #25 of Myron Dago was delivered on 10/29/2020. Treatment was held since that time due to issues of dyspnea and shortness of breath, requiring steroids. Fallon Griffin presents today for continued monitoring  He has no new specific complaints. He continues to have chronic dyspnea on exertion associated with pulmonary fibrosis from smoking history as well as drilling and blasting rock at the [place], but still at baseline without exacerbations. ACTIVE or ASSOCIATED PROBLEMS:  Pulmonary fibrosis secondary to previous history of smoking and drilling rock for blasting at the [facility]   CKD associated anemia responding to Procrit. Fallon Griffin has benign prostatic hypertrophy (BPH)  that is stable on Flomax.    Orthostatic hypotension resolved with adjustment of

## 2023-11-13 ENCOUNTER — HOSPITAL ENCOUNTER (OUTPATIENT)
Dept: INFUSION THERAPY | Age: 83
Discharge: HOME | End: 2023-11-13
Payer: MEDICARE

## 2023-11-13 ENCOUNTER — OFFICE VISIT (OUTPATIENT)
Dept: HEMATOLOGY | Age: 83
End: 2023-11-13
Payer: MEDICARE

## 2023-11-13 VITALS
WEIGHT: 177.6 LBS | HEART RATE: 83 BPM | HEIGHT: 64 IN | OXYGEN SATURATION: 92 % | BODY MASS INDEX: 30.32 KG/M2 | TEMPERATURE: 90.7 F | DIASTOLIC BLOOD PRESSURE: 70 MMHG | SYSTOLIC BLOOD PRESSURE: 130 MMHG

## 2023-11-13 DIAGNOSIS — C34.11 PRIMARY ADENOCARCINOMA OF UPPER LOBE OF RIGHT LUNG (HCC): Primary | ICD-10-CM

## 2023-11-13 DIAGNOSIS — Z71.2 ENCOUNTER TO DISCUSS TEST RESULTS: ICD-10-CM

## 2023-11-13 DIAGNOSIS — N18.4 CHRONIC KIDNEY DISEASE, STAGE IV (SEVERE) (HCC): ICD-10-CM

## 2023-11-13 DIAGNOSIS — R91.1 LUNG NODULE: ICD-10-CM

## 2023-11-13 DIAGNOSIS — Z45.2 ENCOUNTER FOR CENTRAL LINE CARE: ICD-10-CM

## 2023-11-13 DIAGNOSIS — N28.1 RENAL CYST: ICD-10-CM

## 2023-11-13 LAB
ALBUMIN SERPL-MCNC: 3.1 G/DL (ref 3.5–5.2)
ALP SERPL-CCNC: 80 U/L (ref 40–130)
ALT SERPL-CCNC: 22 U/L (ref 21–72)
ANION GAP SERPL CALCULATED.3IONS-SCNC: 13 MMOL/L (ref 7–19)
AST SERPL-CCNC: 30 U/L (ref 17–59)
BASOPHILS # BLD: 0.04 K/UL (ref 0.01–0.08)
BASOPHILS NFR BLD: 0.5 % (ref 0.1–1.2)
BILIRUB SERPL-MCNC: 0.5 MG/DL (ref 0.2–1.3)
BUN SERPL-MCNC: 39 MG/DL (ref 9–20)
CALCIUM SERPL-MCNC: 10 MG/DL (ref 8.4–10.2)
CHLORIDE SERPL-SCNC: 106 MMOL/L (ref 98–111)
CO2 SERPL-SCNC: 22 MMOL/L (ref 22–29)
CREAT SERPL-MCNC: 2 MG/DL (ref 0.6–1.2)
EOSINOPHIL # BLD: 0.23 K/UL (ref 0.04–0.54)
EOSINOPHIL NFR BLD: 3 % (ref 0.7–7)
ERYTHROCYTE [DISTWIDTH] IN BLOOD BY AUTOMATED COUNT: 14.7 % (ref 11.6–14.4)
GLOBULIN: 2.4 G/DL
GLUCOSE SERPL-MCNC: 100 MG/DL (ref 74–106)
HCT VFR BLD AUTO: 43.9 % (ref 40.1–51)
HGB BLD-MCNC: 13.9 G/DL (ref 13.7–17.5)
LYMPHOCYTES # BLD: 1.24 K/UL (ref 1.18–3.74)
LYMPHOCYTES NFR BLD: 16.3 % (ref 19.3–53.1)
MCH RBC QN AUTO: 29 PG (ref 25.7–32.2)
MCHC RBC AUTO-ENTMCNC: 31.7 G/DL (ref 32.3–36.5)
MCV RBC AUTO: 91.6 FL (ref 79–92.2)
MONOCYTES # BLD: 0.76 K/UL (ref 0.24–0.82)
MONOCYTES NFR BLD: 10 % (ref 4.7–12.5)
NEUTROPHILS # BLD: 5.33 K/UL (ref 1.56–6.13)
NEUTS SEG NFR BLD: 69.8 % (ref 34–71.1)
PLATELET # BLD AUTO: 143 K/UL (ref 163–337)
PMV BLD AUTO: 11.9 FL (ref 7.4–10.4)
POTASSIUM SERPL-SCNC: 4.1 MMOL/L (ref 3.5–5.1)
PROT SERPL-MCNC: 7 G/DL (ref 6.3–8.2)
RBC # BLD AUTO: 4.79 M/UL (ref 4.63–6.08)
SODIUM SERPL-SCNC: 141 MMOL/L (ref 137–145)
WBC # BLD AUTO: 7.63 K/UL (ref 4.23–9.07)

## 2023-11-13 PROCEDURE — 85025 COMPLETE CBC W/AUTO DIFF WBC: CPT

## 2023-11-13 PROCEDURE — 3078F DIAST BP <80 MM HG: CPT | Performed by: INTERNAL MEDICINE

## 2023-11-13 PROCEDURE — 2580000003 HC RX 258: Performed by: INTERNAL MEDICINE

## 2023-11-13 PROCEDURE — 99213 OFFICE O/P EST LOW 20 MIN: CPT | Performed by: INTERNAL MEDICINE

## 2023-11-13 PROCEDURE — 99212 OFFICE O/P EST SF 10 MIN: CPT

## 2023-11-13 PROCEDURE — 1036F TOBACCO NON-USER: CPT | Performed by: INTERNAL MEDICINE

## 2023-11-13 PROCEDURE — G8484 FLU IMMUNIZE NO ADMIN: HCPCS | Performed by: INTERNAL MEDICINE

## 2023-11-13 PROCEDURE — 80053 COMPREHEN METABOLIC PANEL: CPT

## 2023-11-13 PROCEDURE — G8427 DOCREV CUR MEDS BY ELIG CLIN: HCPCS | Performed by: INTERNAL MEDICINE

## 2023-11-13 PROCEDURE — 96523 IRRIG DRUG DELIVERY DEVICE: CPT

## 2023-11-13 PROCEDURE — G8417 CALC BMI ABV UP PARAM F/U: HCPCS | Performed by: INTERNAL MEDICINE

## 2023-11-13 PROCEDURE — 1123F ACP DISCUSS/DSCN MKR DOCD: CPT | Performed by: INTERNAL MEDICINE

## 2023-11-13 PROCEDURE — 36415 COLL VENOUS BLD VENIPUNCTURE: CPT

## 2023-11-13 PROCEDURE — 6360000002 HC RX W HCPCS: Performed by: INTERNAL MEDICINE

## 2023-11-13 PROCEDURE — 3075F SYST BP GE 130 - 139MM HG: CPT | Performed by: INTERNAL MEDICINE

## 2023-11-13 RX ORDER — HEPARIN 100 UNIT/ML
500 SYRINGE INTRAVENOUS PRN
Status: DISCONTINUED | OUTPATIENT
Start: 2023-11-13 | End: 2023-11-14 | Stop reason: HOSPADM

## 2023-11-13 RX ORDER — SODIUM CHLORIDE 0.9 % (FLUSH) 0.9 %
20 SYRINGE (ML) INJECTION PRN
OUTPATIENT
Start: 2023-11-13

## 2023-11-13 RX ORDER — SODIUM CHLORIDE 0.9 % (FLUSH) 0.9 %
20 SYRINGE (ML) INJECTION PRN
Status: DISCONTINUED | OUTPATIENT
Start: 2023-11-13 | End: 2023-11-14 | Stop reason: HOSPADM

## 2023-11-13 RX ORDER — HEPARIN 100 UNIT/ML
500 SYRINGE INTRAVENOUS PRN
OUTPATIENT
Start: 2023-11-13

## 2023-11-13 RX ORDER — SODIUM CHLORIDE 0.9 % (FLUSH) 0.9 %
10 SYRINGE (ML) INJECTION PRN
Status: DISCONTINUED | OUTPATIENT
Start: 2023-11-13 | End: 2023-11-14 | Stop reason: HOSPADM

## 2023-11-13 RX ORDER — SODIUM CHLORIDE 0.9 % (FLUSH) 0.9 %
10 SYRINGE (ML) INJECTION PRN
OUTPATIENT
Start: 2023-11-13

## 2023-11-13 RX ADMIN — SODIUM CHLORIDE, PRESERVATIVE FREE 20 ML: 5 INJECTION INTRAVENOUS at 11:17

## 2023-11-13 RX ADMIN — HEPARIN 500 UNITS: 100 SYRINGE at 11:16

## 2023-12-06 ENCOUNTER — HOSPITAL ENCOUNTER (OUTPATIENT)
Dept: NUCLEAR MEDICINE | Age: 83
Discharge: HOME | End: 2023-12-08
Payer: MEDICARE

## 2023-12-06 DIAGNOSIS — C34.11 PRIMARY ADENOCARCINOMA OF UPPER LOBE OF RIGHT LUNG (HCC): ICD-10-CM

## 2023-12-06 DIAGNOSIS — R91.1 LUNG NODULE: ICD-10-CM

## 2023-12-06 LAB
GLUCOSE BLD-MCNC: 108 MG/DL (ref 70–99)
PERFORMED ON: ABNORMAL

## 2023-12-06 PROCEDURE — 82962 GLUCOSE BLOOD TEST: CPT

## 2023-12-06 PROCEDURE — 3430000000 HC RX DIAGNOSTIC RADIOPHARMACEUTICAL: Performed by: INTERNAL MEDICINE

## 2023-12-06 PROCEDURE — A9552 F18 FDG: HCPCS | Performed by: INTERNAL MEDICINE

## 2023-12-06 PROCEDURE — 78815 PET IMAGE W/CT SKULL-THIGH: CPT

## 2023-12-06 RX ORDER — FLUDEOXYGLUCOSE F 18 200 MCI/ML
10 INJECTION, SOLUTION INTRAVENOUS
Status: COMPLETED | OUTPATIENT
Start: 2023-12-06 | End: 2023-12-06

## 2023-12-06 RX ADMIN — FLUDEOXYGLUCOSE F 18 10 MILLICURIE: 200 INJECTION, SOLUTION INTRAVENOUS at 13:53

## 2023-12-12 ENCOUNTER — TELEPHONE (OUTPATIENT)
Dept: HEMATOLOGY | Age: 83
End: 2023-12-12

## 2023-12-12 NOTE — TELEPHONE ENCOUNTER
Called pt. to remind them of appointment on 12/13/23 and had to leave a detailed voicemail with appointment date and time.

## 2023-12-12 NOTE — PROGRESS NOTES
Unfortunately, after initiation of the procedure, he began having ventricular tachycardia and the procedure had to be aborted. Elise Frazier was transferred to the ICU for cardiology evaluation and stabilization. He remained hospitalized until 3/18/2019 when he was medically cleared for discharge. 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 No emergent urologic intervention was warranted and he received monitoring of renal function. He had a creatinine level of 1.9 at discharge. PET scan on 4/2/2019 identified a soft tissue mass in the RUL measuring 1.2 x 3.5 cm with extension to the right anterior hilum with an SUV of 10.1. Evidence of mediastinal and hilar lymphadenopathy, with low-level metabolic activity. Interval increase in the size of a large mass in the right upper abdomen inseparable from the duodenum measuring 10.7 x 6.9 cm compared to 5.4 x 8.9 cm on 2/20/2019 with an SUV of 23.6. Slight increase in 2 small inseparable masses medial to the hilum of the left kidney measuring 2.2 and 2.6 cm and the other measuring 3.9 cm with a maximum SUV of 18.6. Cycle #1 of palliative chemotherapy with Carboplatin, Alimta and Keytruda was initiated 4/18/19 which should also cover this process. Abdominal/pelvic CT 4/26/19 was performed at [facility] due to abdominal pain and melena. The RUQ mass, within the duodenum decreased to 6.8 x 6.2 cm (was 10.7 x 6.9 cm on PET 4/2/19)     CT abdomen and pelvis without contrast at [facility] on 6/27/2019 was compared to 4/26/2019 revealed complete resolution of the previously identified. Duodenal/subhepatic space soft tissue mass. The previously identified heterogenous enhancing lesion in the left renal pelvis was much less prominent but there does continue to be some mild left caliectasis.      CT chest without contrast at [facility] on 1/9/2020 was compared to 10/17/2019

## 2023-12-13 ENCOUNTER — OFFICE VISIT (OUTPATIENT)
Dept: HEMATOLOGY | Age: 83
End: 2023-12-13
Payer: MEDICARE

## 2023-12-13 ENCOUNTER — HOSPITAL ENCOUNTER (OUTPATIENT)
Dept: INFUSION THERAPY | Age: 83
Discharge: HOME | End: 2023-12-13
Payer: MEDICARE

## 2023-12-13 VITALS
HEIGHT: 64 IN | DIASTOLIC BLOOD PRESSURE: 80 MMHG | HEART RATE: 90 BPM | TEMPERATURE: 98.2 F | BODY MASS INDEX: 30.27 KG/M2 | WEIGHT: 177.3 LBS | SYSTOLIC BLOOD PRESSURE: 124 MMHG | OXYGEN SATURATION: 93 %

## 2023-12-13 DIAGNOSIS — R91.1 LUNG NODULE: ICD-10-CM

## 2023-12-13 DIAGNOSIS — C34.11 PRIMARY ADENOCARCINOMA OF UPPER LOBE OF RIGHT LUNG (HCC): Primary | ICD-10-CM

## 2023-12-13 PROCEDURE — 3079F DIAST BP 80-89 MM HG: CPT | Performed by: INTERNAL MEDICINE

## 2023-12-13 PROCEDURE — 1123F ACP DISCUSS/DSCN MKR DOCD: CPT | Performed by: INTERNAL MEDICINE

## 2023-12-13 PROCEDURE — 99212 OFFICE O/P EST SF 10 MIN: CPT

## 2023-12-13 PROCEDURE — G8427 DOCREV CUR MEDS BY ELIG CLIN: HCPCS | Performed by: INTERNAL MEDICINE

## 2023-12-13 PROCEDURE — 1036F TOBACCO NON-USER: CPT | Performed by: INTERNAL MEDICINE

## 2023-12-13 PROCEDURE — G8484 FLU IMMUNIZE NO ADMIN: HCPCS | Performed by: INTERNAL MEDICINE

## 2023-12-13 PROCEDURE — G8417 CALC BMI ABV UP PARAM F/U: HCPCS | Performed by: INTERNAL MEDICINE

## 2023-12-13 PROCEDURE — 3074F SYST BP LT 130 MM HG: CPT | Performed by: INTERNAL MEDICINE

## 2023-12-13 PROCEDURE — 99215 OFFICE O/P EST HI 40 MIN: CPT | Performed by: INTERNAL MEDICINE

## 2023-12-14 ENCOUNTER — HOSPITAL ENCOUNTER (OUTPATIENT)
Dept: RADIATION ONCOLOGY | Facility: HOSPITAL | Age: 83
Setting detail: RADIATION/ONCOLOGY SERIES
End: 2023-12-14
Payer: MEDICARE

## 2023-12-19 PROBLEM — Z87.891 FORMER SMOKER: Status: ACTIVE | Noted: 2023-12-19

## 2023-12-20 NOTE — PROGRESS NOTES
[facility]SDelonDelon Cool MD      MARITZA Gay  ____________________________________________________________               [facility]  Department of Radiation Oncology  [address] [place] [id number]  Office:  [phone]  Fax: [phone]    DATE:12/21/2023  PATIENT: Karoline Prater 1940  Medical record #: [id number]                                                    REASON FOR CONSULTATION:   Chief Complaint   Patient presents with    Lung Cancer     Karoline Prater is a 83 y.o. male that has been referred to our clinic to be evaluated for consideration of radiotherapy to the lung. Reports SOB. Denies unexpected weight change, fatigue, appetite change, cough, trouble swallowing, nasuea/vomiting, diarrhea, light-headedness, weakness, and headaches. He follows .            HISTORY OF PRESENT ILLNESS    09/12/2018 - CT Chest due to weight loss:  New lobulated right upper lobe mass 5.7 cm that extended to the back hilum  Numerous mediastinal lymph nodes ranging in size from mm to 1.3 cm and  subcarinal lymph node that measured 1.5 x 1.5 x 3.5 cm    09/12/2018 - CT Abdomen/Pelvis:  4.9 x 4 x 4 cm soft tissue mass with peripheral calcification immediately adjacent to the gallbladder in the head of the pancreas area    10/11/2018 - CT Chest without contrast:  5.7 x 3.2 cm mass in the right upper lobe is suspicious for primary pulmonary neoplasm, with apparent extension into the right upper lobe bronchus that supplies this portion of the lung. After discussion with Dr. Michele, the CT-guided biopsy scheduled for today was canceled in favor of bronchoscopy and biopsy. The patient understands that if the bronchoscopy and biopsy are nondiagnostic, CT-guided biopsy may still be necessary.  COPD, with mild interstitial disease, numerous small subpleural nodules are seen in the lung bases, with an average size of 2 to 3 mm.  Evidence of healed  granulomatous disease.  Small nonobstructing right-sided nephrolithiasis measuring 4 mm.  Small coarse calcification in the posterior pancreatic head which may be related to chronic pancreatitis.    10/11/2018 - MRI Abdomen with and without contrast:  4.1 x 3.4 cm soft tissue mass in the subhepatic space, abutting the second portion the duodenum with mild displacement of the duodenum. The mass is probably separate from the duodenum. The mass shows mild enhancement and also contains some coarse calcifications.   There is no involvement of the pancreas and the pancreas appears within normal limits. Differential possibilities include a desmoid tumor, less likely leiomyoma of the wall of the duodenum or malignancy. Consider further evaluation with endoscopic ultrasound and biopsy of this mass.    10/23/2018 - Chest x-ray:  No significant change in the RIGHT upper lobe mass.  There is a 2.2 cm nodule overlying the LEFT lower chest that is likely within the soft tissues. This can be followed on subsequent exams.    10/24/2018 -  Bronchoscopy with biopsy:  Station 7 lymph node, endobronchial ultrasound guided fine needle aspiration, smears (6), ThinPrep preparation (1) and cell block.  Background small, mature lymphocytes.  Clusters of histiocytes suggestive of granulomata.  A few bronchial epithelial cells.  Negative for malignant cells.  Kinyoun and GMS stains are negative for acid-fast bacilli and fungal organisms, respectively.  Bronchial washings, smears (4) and cell block:  Blood, bronchial epithelial cells and a few histiocytes.  Negative for malignant cells.  Kinyoun and GMS stains are negative for acid-fast bacilli and fungal organisms, respectively.    11/27/2018 - Bronchoscopy/EBUS with biopsy per :  Lung mass, right upper lobe, FNA aspiration smear and cell block:  adenocarcinoma. See comment  Lung, right upper lobe, bronchoalveolar lavage:  adenocarcinoma  Lymph node, 10L, FNA:  bronchial epithelial  cells, proteinaceous debris and rare inflammatory cells, no malignant cells identified  Lymph node, 4L, FNA:  lymphocytes and bronchial epithelial cells; no malignant cells identified  Lymph node, station 7, FNA:  lymphocytes and bronchial epithelial cells, no malignant cells identified  Lymph node, 4R, FNA:  lymphocytes and bronchial epithelial cells, no malignant cells identified  Lymph node, 10R, FNA:  bronchial epithelial cells, blood with acute inflammatory; no definite lymphocytes or malignant cells identified  Lung mass, right upper lobe, transbronchial biopsies:  moderate to poorly differentiated adenocarcinoma, consistent with a lung primary    11/27/2018 - PET Scan:  Large FDG avid right upper lobe pulmonary mass consistent with malignancy.  This could be a primary lung malignancy or a metastasis.  Hypermetabolic normal-sized to mildly enlarged mediastinal lymph nodes and bilateral pulmonary hilar lymph nodes. The level of uptake within these lymph nodes suggests neoplasm. Reactive lymphadenopathy would be less likely.  Hypermetabolic irregular right internal mammary nodule suspect for a metastasis.  Upper abdominal mass contiguous with the duodenum showing marked   FDG uptake consistent with neoplasm. This mass contains a few coarse calcifications. These could be dystrophic calcifications. Calcifications can also be seen in some mucinous tumors. Carcinoid tumors may also calcify, although the scan does not show the perilesional stranding often associated with carcinoid, and the level of FDG uptake is higher in this case than typically seen with carcinoid. Differential diagnosis includes metastasis, lymphoma, and primary neoplasm of small bowel.  There is a focus of increased FDG uptake along a small bowel loop in the anterior midabdomen. The unehanced transmission CT images suggest mild   soft tissue thickening of the wall in this region. Possible neoplastic nodule versus artifact.  A dedicated,  contrast-enhanced CT scan of the chest, abdomen, and pelvis, and also utilizing orally administered contrast material, is suggested for further evaluation.    02/18/2019 - CT Chest with contrast:  New left hydronephrosis with decreased enhancement of the left kidney compared to the right, this is incompletely visualized and characterized. Recommend CT abdomen and pelvis.  Increased size of subhepatic mass adjacent to the descending duodenum which measures 4.3 x 9.0 cm, previously 4.1 x 3.4 cm on 10/11/2018 abdomen MRI. This is concerning for malignancy.  There is a 0.8 cm nodular opacity within the trachea, which could represent adherent mucus or mass. Recommend direct visualization or attention on follow-up.  Slightly decreased size of right upper lobe mass measuring 4.9 x 3.5 cm, previously 6.1 x 3.6 cm. Similar mediastinal and paraesophageal lymphadenopathy.   Similar pneumobilia.    02/20/2019 - CT Abdomen/Pelvis without contrast:  Mild left-sided hydronephrosis likely related to obstruction of the proximal left ureter due to a lobular 3.5 cm mass medial to the left renal hilum, 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 Retention of contrast within the left renal cortex in patchy distribution may represent ATN. There is increased attenuation of urine within the dilated left renal collecting system probably due to delayed excretion of contrast.  Compared with the previous MRI from October, 2018 there is heart increase in size of the large subhepatic mass, currently measuring 8.9 x 5.4 cm, compared with 4.1 x 3.4 cm previous. This is concerning for neoplasm also.    04/02/2019 - PET Scan:  Abnormal PET/CT, with a lobular hypermetabolic mass in the right lung, upper lobe, measuring approximately 5.2 x 3.5 cm, with extension to the anterior right hilum. This has a maximum SUV of 10.1 is compatible with malignancy. There is evidence of mediastinal and hilar lymphadenopathy, with low-level metabolic activity.  Interval increase in  size of a large mass in the right upper abdomen, inseparable from the duodenum, measuring approximately 10.7 x 6.9 cm, compared with 5.4 x 8.9 cm on the CT from 02/20/2019. This mass has a maximum SUV of 23.6.  Slight increase in 2 small inseparable masses medial to the hilum of the left kidney, measuring 2.2 and 2.6 cm and the other measuring 3.9 cm, the maximum SUV of 18.6. There is associated mild left sided hydronephrosis probably due to obstruction of the proximal left ureter.    04/18/2019 - Chemotherapy course:  Palliative Carbo/Alimta/Keytruda (to cover pancreatic mass as well)    04/26/2019 - CT Abdomen/Pelvis with contrast:  Interval decrease in size of the duodenal or letty hepatis mass.  Interval decrease in size of the obstructive mass in the LEFT renal hilum. This now measures approximately 2.9 x 2.1 cm.  No new disease.  Urinary bladder distention and hydroureter is likely due to outlet obstruction.    06/27/2019 - CT Chest without contrast:  Continued diminishment in size of a primary lung neoplasm within the right upper lobe. Overall dimensions of the lesion have decreased as well as a diminishing soft tissue component with increased tumor necrosis. No new lesions are present.  Essentially stable mediastinal and paraesophageal lymphadenopathy. One of the AP window nodes shows slight interval increase in size but only by a few millimeters. No new adenopathy is demonstrated. No convincing evidence of osseous metastases to the bony thorax.  Pneumobilia.  Coronary calcifications with mild cardiomegaly. No evidence of pericardial effusion.  Borderline dilatation of the ascending thoracic aorta..    06/27/2019 - CT Abdomen/Pelvis without contrast:  Previously identified periduodenal/subhepatic space soft tissue mass appears resolved.   Previously identified mass at the left renal pelvis appears significantly decreased. Difficult to say if it is completely resolved on this noncontrast exam. There does  appear to be a residual mild left caliectasis.  No new mass lesion identified in the abdomen or pelvis. No suspicious adenopathy.    08/10/2019 - CT Chest without contrast:  Increased soft tissue component in the known right upper lobe mass.  Increase/more conspicuous interlobular septal thickening, with no new discrete left-sided mass or nodule identified on today's examination.  Similar mediastinal lymphadenopathy.    10/17/2019 - CT Chest without contrast:  Spiculated lesion in the right upper lobe laterally is not significantly changed. Size measurements are listed above.  The patient has moderately severe pulmonary fibrotic changes that appears to be worsening.  There are couple of new areas of nodularity peripherally on the left side likely representing coalescing areas of fibrosis. New pulmonary nodules are felt to be less likely. There is a 6 mm area in the left upper lobe on image 65 of series 3 and a 1.3 cm area in the extreme left lung base on image 113 of series 3.  Atheromatous disease of the thoracic aorta and coronary arteries with cardiomegaly. The pulmonary arteries are dilated.  Small scattered mediastinal lymph nodes appear stable.      10/17/2019 - CT Abdomen/Pelvis without contrast:  Atheromatous calcification of the aortoiliac vessels and coronary arteries. Cardiomegaly.  Prior cholecystectomy. Air in the biliary tree likely related to previous sphincterotomy.  No CT evidence of metastatic disease.  Hyperdense and hypodense renal lesions bilaterally appear relatively stable. These are probably a combination of simple cysts and hemorrhagic cysts. These lesions are not fully characterized without contrast administration. They all appear relatively stable in size compared to the previous exam. Stranding of the fat around the kidneys is seen suggesting chronic inflammation. This is also present previously.  Enlarged prostate measuring 4.9 cm. Bladder wall thickening is likely associated with  hypertrophy of the muscle. Acute and chronic inflammation are possible. Neoplasm less likely.  Small fat-containing inguinal hernias.  Probable chondroid lesion in the right femoral head/neck junction, stable. Probable enchondroma. Degenerative changes of the spine. Bilateral pars defects at L5 with grade 1 spondylolisthesis.    01/09/2020 - CT Chest without contrast:  Stable spiculated right upper lobe nodule/mass with similar mediastinal lymphadenopathy. Questionable right hilar adenopathy with diminishes assessment due to the lack of IV contrast secondary to diminished renal function.  Advanced emphysema as well as severe pulmonary fibrosis.  No new pulmonary nodules.  Cardiomegaly. Moderate vascular calcifications with involvement of coronary arteries.    01/09/2020 - CT Abdomen/Pelvis without contrast:  Mildly prominent aortocaval retroperitoneal lymph nodes position just below the level of the renal vein worrisome for potential lymphatic metastasis. These are very similar to 10/17/2019 but have slightly increased in size compared back to 04/26/2019.  Pneumobilia. No discrete suspicious focal liver lesion.  Apparent wall thickening of the duodenum. Duodenal pathology considered on (infectious/inflammatory or neoplastic).  Similar scattered renal lesions favorable for hyperintense and simple renal cysts but not completely assessed with nonenhanced imaging. No hydronephrosis.  Borderline prominent prostate. Fatty containing inguinal hernias.  Stable chondroid based lesion of the right femoral neck. Similar grade 1 L5 spondylolisthesis.    07/16/2020 - CT Abdomen/Pelvis without contrast:  A retroperitoneal lymph node within the aortocaval space has decreased slightly in size compared with 6 months ago.  No new abnormality is seen.    11/12/2020 - CT Chest without contrast:  Mixed response therapy with interval decrease in size in the RIGHT upper lobe pulmonary nodule but increase in size and mediastinal lymph  nodes.  In addition, there is severe emphysema with findings of severe pulmonary fibrosis. Interval worsening of interstitial opacities bilaterally as well as suggestion of some pleural nodularity. Lymphangitic spread of malignancy should be considered.  Small pleural effusion on the RIGHT is new.    11/12/2020 - CT Abdomen/Pelvis without contrast:  The aortic caval node described on the comparison study is not significantly changed in size when measured at the same location.  Nonobstructing renal calculus on the LEFT. No change in the indeterminate renal lesions on the LEFT, likely cysts.  There is nonspecific free fluid in the pelvis, new since the prior study and there is some mild nonspecific mesenteric haziness.    03/17/2021 - CT Chest without contrast:  Decrease in size of right upper lobe mass. This was 22 x 47 mm. This is now 20 x 33 mm.  Severe emphysematous change with extensive fibrotic change throughout the right and left lung.  Small bilateral basilar pneumothoraces  Coronary and aortic vascular calcification.    03/17/2021 - CT Abdomen/Pelvis without contrast:  Nonobstructing calculus lower pole left kidney  Low-density lesions associated with the left renal contour probably proteinaceous cyst these are unchanged  Chronic right lateral pleural complex in the lung base with bilateral small basilar pneumothoraces..     08/25/2021 - CT Chest without contrast:  Small right pneumothorax.  Redemonstration right upper lobe spiculated mass measuring 3.0 x 2.1 cm, previously 3.3 x 2.0 cm on 3/17/2021.  Similar right upper lobe 0.6 cm spiculated pulmonary nodule compared to 11/12/2020.  Emphysematous and fibrotic lung changes, similar to prior.  Similar mildly enlarged mediastinal lymph nodes.  Borderline heart size with coronary artery calcifications versus stent.    11/15/2021 - CT Chest without contrast:  There is a spiculated mass in the right upper lobe as before, this appears unchanged, measuring  approximately 3.0 x 2.2 cm. This has bands of soft tissue extend to the lateral pleura there is pleural thickening, also stable. There is a stable 4 mm stellate shaped nodule in the right upper lobe. No new pulmonary nodule is identified.  No measurable mediastinal or hilar lymphadenopathy on this limited noncontrast study.  Slight increase in volume of loculated pleural fluid in the right lung base, with increasing pleural thickening at the anterior margin of the right inferior hemithorax. This may be related to radiation change.  Small pericardial effusion. Extensive atherosclerotic disease of the coronary arteries and thoracic aorta. 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    11/15/2021 - CT Abdomen/Pelvis without contrast:  Stable exam with no evidence of intra-abdominal or pelvic metastatic disease.  Cysts are noted within the left kidney as before, there is a stable 10 mm exophytic probable atelectasis at the inferior pole the left kidney. There is a 7 mm nonobstructing nephrolithiasis at the inferior pole the left kidney.  Extensive interstitial fibrosis in the lung bases with loculated right basilar pleural fluid and pleural thickening, described in more detail in a separate chest CT report today.  Heavy calcified atherosclerotic plaque is noted within the abdominal aorta and iliac arteries with normal aortic caliber.  Stable bilateral fat-containing inguinal hernias.  Probable right-sided constipation without evidence of bowel obstruction.    11/16/2022 - CT Chest without contrast:  Enlarging 9 mm spiculated RIGHT upper lobe pulmonary nodule. This is concerning for a primary lung neoplasm. Differential diagnosis also includes an indolent infectious or inflammatory process. Consider further evaluation with PET/CT.   No change in 3 cm spiculated mass in the RIGHT upper lobe abutting the major and minor fissure.  Emphysema and pulmonary fibrosis, similar to prior  exams.    11/16/2022 - CT Abdomen/Pelvis without contrast:  Interstitial fibrosis within the lung bases. There is a loculated appearing effusion within the right lateral costophrenic angle.  Pneumobilia as well as a small amount of contrast within the more central biliary tree. This is felt to be related to a biliary diversion with what appears to be a pulled up bowel loop within the right upper quadrant. The gallbladder is surgically absent. No discrete hepatic mass is identified  Nonobstructing left-sided nephrolithiasis. There are cysts of the left kidney. There are 2 lesions which are more indeterminate but which are stable from the previous exam involving the posterior mid pole and lower pole of the left kidney. These could be followed up on subsequent imaging. No evidence of ureteral stone or obstructive uropathy.  Mild constipation. There is no obstruction or free air. The small bowel is normal distribution and appearance.  Atheromatous calcification ectasia the abdominal aorta and iliac arteries.  Small fat-containing inguinal hernias. The prostate gland is mildly enlarged. Urinary bladder is normal in appearance.  Listhesis at L4-L5 and L5-S1 as described above.  Overall stable appearance from the previous exam. No radiographic evidence of metastatic disease to the abdomen or pelvis    01/05/2023 - CT Abdomen/pelvis without contrast:  Mild constipation. Otherwise normal bowel gas pattern.  Right-sided pleural effusion which appears loculated with associated pleural thickening. The pleural thickening is somewhat nodular in appearance and if the patient has a prior history of lung neoplasm could potentially represent pleural-based studding. This shows some progression from the previous examination. The effusion shows slight interval increase in size.  There is interstitial fibrosis within both lung bases.  Mild aneurysmal dilatation of the ascending thoracic aorta. Heavy coronary calcifications are present.  There is no pericardial effusion.  Cortical cysts of both kidneys. There are again 2 lesions within the left kidney which are more indeterminate but which are stable from the previous exam. There is nonobstructing left-sided nephrolithiasis. No evidence of ureteral stone or obstructive uropathy. No free fluid or localized inflammatory process is demonstrated.  Small bilateral fat-containing inguinal hernias. The prostate gland is enlarged.  Listhesis at the L4-L5 and L5-S1 level..     11/08/2023 - CT Abdomen/Pelvis without contrast:  Pneumobilia, increased from prior.  This may be seen following sphincterotomy or other hepatobiliary procedures.  Recommend correlation with operative history and hepatic enzyme levels.  No portal venous gas or bowel pneumatosis.   Nonobstructing left nephrolithiasis and unchanged left renal cysts.   Moderate quantity of stool throughout the colon.     11/08/2023 - CT Chest without contrast:  Significant increase in size of an anterior right apical nodule now measuring 2.6 x 1.9 cm, compared to 0.9 cm previously, highly suspicious for enlarging neoplasm.   Stable 3.6 x 1.9 cm spiculated right upper lobe mass.   Increased opacity along the anterior minor fissure which is nonspecific.   Stable fibrosis and emphysema.   Stable enlarged, partially calcified mediastinal lymph nodes.     12/06/2023 - PET Scan:  The PET CT examination demonstrates a very high level of activity in the  2.6 x 1.90 cm anterior right apical nodular consolidation seen on the patient's recent CT exam.  The SUV max of 14.8 is consistent with malignancy until proven otherwise.  The 3.6 x 1.9 cm spiculated nodule seen in the right upper lobe below this with SUV max of 5.34 is also consistent with an increased likelihood of malignancy.  There is modest increased activity in nodes in the right hilum and mediastinum, one or more of which may be metastatic.  The examination otherwise demonstrates a generally physiologic  distribution of activity elsewhere in the thorax, as described in the report.   The examination demonstrates a generally physiologic distribution of activity in the abdomen and pelvis, as described in the report.   The examination demonstrates a generally physiologic distribution of activity in the included portions of the head and neck, as described in the report.   The examination demonstrates a generally physiologic distribution of activity in the included portions of the skeleton and extremeties, as described in the report.     12/13/2023 - Appointment with Dr. Michele:  Referral is made to Dr. Danny Cool for SBRT to the abnormal lung lesions  I will see Mr. Prater back in follow-up in 2 months        History obtained from  PATIENT, FAMILY, and CHART    PAST MEDICAL HISTORY  Past Medical History:   Diagnosis Date    Arthritis     Atrial fibrillation with rapid ventricular response 05/31/2019    Blindness of left eye     BPH with obstruction/lower urinary tract symptoms     Cancer     stomach & lung    CHF (congestive heart failure)     CKD (chronic kidney disease) stage 3, GFR 30-59 ml/min     Coronary artery disease     Elevated cholesterol     Essential hypertension 10/02/2017    Fibrotic lung diseases     GERD (gastroesophageal reflux disease)     Hearing loss     History of transfusion     Hydronephrosis of left kidney     Hyperlipidemia     Hypertension     pt was taken off of all of his medications for BP (atenolol, lisinopril, lasix) because his BP kept bottoming out so his primary dr told him to discontinue them 1-2 months ago (Jan/Feb 2019). pt states he takes no medications currently.    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 Patel is just going to keep an eye on it and not do surgery right now.    Mediastinal adenopathy 10/24/2018    Station  7    Monoclonal gammopathy of unknown significance (MGUS) 2018    Pancreatic mass     pt states he had this in  but it went away on its own. Now recent CT shows it has come back so he is going to have an ultrasound on 3/13/19.    Shortness of breath       PAST SURGICAL HISTORY  Past Surgical History:   Procedure Laterality Date    ABDOMINAL SURGERY      BRONCHOSCOPY N/A 10/24/2018    Procedure: BRONCHOSCOPY WITH BIOPSY, EBUS;  Surgeon: Gareth Becerra MD;  Location: [facility] OR;  Service: Pulmonary    CHOLECYSTECTOMY      COLONOSCOPY N/A 1/3/2019    Procedure: COLONOSCOPY WITH ANESTHESIA;  Surgeon: Randy Somers DO;  Location: [facility] ENDOSCOPY;  Service: Gastroenterology    CYSTOSCOPY, RETROGRADE PYELOGRAM AND STENT INSERTION Left 3/8/2019    Procedure: CYSTOSCOPY RETROGRADE BILATERAL PYELOGRAM;  Surgeon: Jos Sylvester MD;  Location: [facility] OR;  Service: Urology    ENDOSCOPY N/A 2018    Procedure: ESOPHAGOGASTRODUODENOSCOPY WITH ANESTHESIA;  Surgeon: Randy Somers DO;  Location: [facility] ENDOSCOPY;  Service: Gastroenterology    ENDOSCOPY N/A 2019    Procedure: ESOPHAGOGASTRODUODENOSCOPY WITH ANESTHESIA;  Surgeon: Lilliam Jj MD;  Location: [facility] OR;  Service: Gastroenterology    ENDOSCOPY N/A 2019    Procedure: ESOPHAGOGASTRODUODENOSCOPY WITH ANESTHESIA;  Surgeon: Pilo Bansal MD;  Location: [facility] ENDOSCOPY;  Service: Gastroenterology    EYE SURGERY Left 1964    FOOT SURGERY Right 1966    joint    FRACTURE SURGERY        FAMILY HISTORY  family history includes Hypertension in his mother; Leukemia in his father.    SOCIAL HISTORY  Social History     Tobacco Use    Smoking status: Former     Years: 49     Types: Cigarettes     Quit date: 10/29/2003     Years since quittin.1    Smokeless tobacco: Former     Types: Chew     Quit date:    Substance Use Topics    Alcohol use: No    Drug use: No      ALLERGIES  Penicillins     MEDICATIONS  Current Outpatient  Medications   Medication Sig Dispense Refill    acetaminophen (TYLENOL) 500 MG tablet Take 2 tablets by mouth Every Night.      allopurinol (ZYLOPRIM) 100 MG tablet Take 1 tablet by mouth Daily.      furosemide (LASIX) 20 MG tablet Take 1 tablet by mouth 2 (Two) Times a Day.      losartan (COZAAR) 50 MG tablet Take 1 tablet by mouth Daily.      meclizine (ANTIVERT) 12.5 MG tablet Take 1 tablet by mouth 3 (Three) Times a Day As Needed for Dizziness.      melatonin 5 MG tablet tablet Take 2 tablets by mouth Every Night.      metoprolol succinate XL (TOPROL-XL) 25 MG 24 hr tablet Take 1 tablet by mouth Daily.      multivitamin with minerals tablet tablet Take 1 tablet by mouth Daily.      pantoprazole (PROTONIX) 40 MG EC tablet Take 1 tablet by mouth Daily. 30 tablet 1    sodium bicarbonate 650 MG tablet Take 1 tablet by mouth 4 (Four) Times a Day.      tamsulosin (FLOMAX) 0.4 MG capsule 24 hr capsule Take 1 capsule by mouth Every Night.       No current facility-administered medications for this visit.     The following portions of the patient's history were reviewed and updated as appropriate: allergies, current medications, past family history, past medical history, past social history, past surgical history and problem list.    REVIEW OF SYSTEMS  Review of Systems   Constitutional: Negative.  Negative for appetite change, fatigue and unexpected weight change.   HENT:  Negative.     Respiratory:  Positive for shortness of breath. Negative for cough and hemoptysis.    Cardiovascular: Negative.  Negative for chest pain.   Gastrointestinal: Negative.    Genitourinary: Negative.     Skin: Negative.    Neurological: Negative.  Negative for dizziness and headaches.   Hematological: Negative.  Negative for adenopathy.   Psychiatric/Behavioral: Negative.     All other systems reviewed and are negative.      I have reviewed and confirmed the accuracy of the ROS as documented by the MA/LPN/RN Danny Cool, OG,  "MD    PHYSICAL EXAM  VITAL SIGNS:   Vitals:    12/21/23 1029   BP: 158/78   Pulse: 80   SpO2: 94%  Comment: room air   Weight: 81.2 kg (179 lb)   Height: 162.6 cm (64\")   PainSc: 0-No pain       Physical Exam  Vitals reviewed.   Constitutional:       Appearance: Normal appearance.   HENT:      Head: Normocephalic.   Cardiovascular:      Rate and Rhythm: Normal rate and regular rhythm.      Pulses: Normal pulses.      Heart sounds: Normal heart sounds.   Pulmonary:      Effort: Pulmonary effort is normal. No respiratory distress.      Breath sounds: Normal breath sounds.   Abdominal:      General: Bowel sounds are normal.   Musculoskeletal:         General: Normal range of motion.      Cervical back: Normal range of motion and neck supple.   Skin:     General: Skin is warm.      Capillary Refill: Capillary refill takes less than 2 seconds.   Neurological:      General: No focal deficit present.      Mental Status: He is alert and oriented to person, place, and time.   Psychiatric:         Mood and Affect: Mood normal.         Behavior: Behavior normal.           Performance Status: ECOG (1) Restricted in physically strenuous activity, ambulatory and able to do work of light nature    Clinical Quality Measures  -Pain Documented by Standardized Tool, FPS Sharitawarren Prater reports a pain score of 0.  Given his pain assessment as noted, treatment options were discussed and the following options were decided upon as a follow-up plan to address the patient's pain:  No pain,no plan given    Pain Medications               acetaminophen (TYLENOL) 500 MG tablet Take 2 tablets by mouth Every Night.          -Advanced Care Planning Advance Care Planning   ACP discussion was held with the patient during this visit. Patient does not have an advance directive, information provided.     -Body Mass Index Screening and Follow-Up Plan BMI is >= 30 and <35. (Class 1 Obesity). The following options were offered after discussion;: none " (medical contraindication)    -Tobacco Use: Screening and Cessation Intervention Social History    Tobacco Use      Smoking status: Former        Years: 51        Types: Cigarettes        Quit date: 10/29/2003        Years since quittin.1      Smokeless tobacco: Former        Types: Chew        Quit date:     ASSESSMENT AND PLAN  1. Malignant neoplasm of upper lobe of right lung    2. Former smoker      No orders of the defined types were placed in this encounter.    RECOMMENDATIONS: Karoline Prater was diagnosed with two PET+ right lung nodules.     The indications and rationale of lung stereotactic/external beam radiation therapy according to the NCCN Guidelines has been discussed today. I have extensively reviewed the risks, benefits and alternatives of therapy with this diagnosis. The risks of radiation therapy includes but is not limited to radiation induced pulmonary fibrosis, progression of disease in spite of therapy with either local or systemic failure. I have seen, examined and reviewed this patient's medication list, appropriate labs and imaging studies as well as other physician notes. We discussed the goals and plans of care with the patient and family and answered all questions.     Following this discussion and in consideration of the diagnostic data/evaluation of the patient, I recommended a course of stereotactic radiosurgery to two right lung nodules. Will simulate treatment fields today, 3D CT with MIPS to begin the planning process, final course pending.    Continue ongoing management per primary care physician and other specialists. Thank you for allowing me to assist in this patients care.     Patient Instructions   1) Plan on 4-5 pinpoint treatments     Time Spent: I spent 58 minutes caring for Karoline on this date of service. This time includes time spent by me in the following activities: preparing for the visit, reviewing tests, obtaining and/or reviewing a separately obtained  history, performing a medically appropriate examination and/or evaluation, counseling and educating the patient/family/caregiver, ordering medications, tests, or procedures, referring and communicating with other health care professionals, documenting information in the medical record, independently interpreting results and communicating that information with the patient/family/caregiver, and care coordination.   Danny Cool III, MD  12/21/2023

## 2023-12-21 ENCOUNTER — CONSULT (OUTPATIENT)
Dept: RADIATION ONCOLOGY | Facility: HOSPITAL | Age: 83
End: 2023-12-21
Payer: MEDICARE

## 2023-12-21 VITALS
HEART RATE: 80 BPM | HEIGHT: 64 IN | SYSTOLIC BLOOD PRESSURE: 158 MMHG | WEIGHT: 179 LBS | DIASTOLIC BLOOD PRESSURE: 78 MMHG | BODY MASS INDEX: 30.56 KG/M2 | OXYGEN SATURATION: 94 %

## 2023-12-21 DIAGNOSIS — Z87.891 FORMER SMOKER: ICD-10-CM

## 2023-12-21 DIAGNOSIS — C34.11 MALIGNANT NEOPLASM OF UPPER LOBE OF RIGHT LUNG: Primary | ICD-10-CM

## 2023-12-21 PROCEDURE — G0463 HOSPITAL OUTPT CLINIC VISIT: HCPCS | Performed by: RADIOLOGY

## 2023-12-21 PROCEDURE — 77334 RADIATION TREATMENT AID(S): CPT | Performed by: RADIOLOGY

## 2023-12-21 RX ORDER — ALLOPURINOL 100 MG/1
100 TABLET ORAL DAILY
COMMUNITY

## 2023-12-21 RX ORDER — METOPROLOL SUCCINATE 25 MG/1
25 TABLET, EXTENDED RELEASE ORAL DAILY
COMMUNITY

## 2023-12-21 RX ORDER — SODIUM BICARBONATE 650 MG/1
650 TABLET ORAL 4 TIMES DAILY
COMMUNITY

## 2023-12-21 RX ORDER — MECLIZINE HCL 12.5 MG/1
12.5 TABLET ORAL 3 TIMES DAILY PRN
COMMUNITY

## 2024-01-02 ENCOUNTER — HOSPITAL ENCOUNTER (OUTPATIENT)
Dept: RADIATION ONCOLOGY | Facility: HOSPITAL | Age: 84
Setting detail: RADIATION/ONCOLOGY SERIES
End: 2024-01-02
Payer: MEDICARE

## 2024-01-05 ENCOUNTER — CLINICAL DOCUMENTATION (OUTPATIENT)
Dept: HEMATOLOGY | Age: 84
End: 2024-01-05

## 2024-01-05 NOTE — PROGRESS NOTES
Spoke with Abigail at Dr. Grant's office, Dr. Grant is working on treatment plan and awaiting authorization.

## 2024-01-11 PROCEDURE — 77338 DESIGN MLC DEVICE FOR IMRT: CPT | Performed by: RADIOLOGY

## 2024-01-11 PROCEDURE — 77301 RADIOTHERAPY DOSE PLAN IMRT: CPT | Performed by: RADIOLOGY

## 2024-01-11 PROCEDURE — 77300 RADIATION THERAPY DOSE PLAN: CPT | Performed by: RADIOLOGY

## 2024-01-11 PROCEDURE — 77293 RESPIRATOR MOTION MGMT SIMUL: CPT | Performed by: RADIOLOGY

## 2024-01-18 ENCOUNTER — CLINICAL DOCUMENTATION (OUTPATIENT)
Dept: HEMATOLOGY | Age: 84
End: 2024-01-18

## 2024-01-18 NOTE — PROGRESS NOTES
Mr. Horner is scheduled for 5 Treatment fractions for a total of 5000 cGy to begin 1/24/2024 - 2/5/2024 at [facility] with Dr. Patrick Grant.

## 2024-01-24 ENCOUNTER — HOSPITAL ENCOUNTER (OUTPATIENT)
Dept: RADIATION ONCOLOGY | Facility: HOSPITAL | Age: 84
Setting detail: RADIATION/ONCOLOGY SERIES
Discharge: HOME | End: 2024-01-24
Payer: MEDICARE

## 2024-01-24 LAB
RAD ONC ARIA COURSE ID: NORMAL
RAD ONC ARIA COURSE LAST TREATMENT DATE: NORMAL
RAD ONC ARIA COURSE START DATE: NORMAL
RAD ONC ARIA COURSE TREATMENT ELAPSED DAYS: 0
RAD ONC ARIA FIRST TREATMENT DATE: NORMAL
RAD ONC ARIA PLAN FRACTIONS TREATED TO DATE: 1
RAD ONC ARIA PLAN ID: NORMAL
RAD ONC ARIA PLAN PRESCRIBED DOSE PER FRACTION: 10 GY
RAD ONC ARIA PLAN PRIMARY REFERENCE POINT: NORMAL
RAD ONC ARIA PLAN TOTAL FRACTIONS PRESCRIBED: 5
RAD ONC ARIA PLAN TOTAL PRESCRIBED DOSE: 5000 CGY
RAD ONC ARIA REFERENCE POINT DOSAGE GIVEN TO DATE: 10 GY
RAD ONC ARIA REFERENCE POINT ID: NORMAL
RAD ONC ARIA REFERENCE POINT SESSION DOSAGE GIVEN: 10 GY

## 2024-01-24 PROCEDURE — 77373 STRTCTC BDY RAD THER TX DLVR: CPT | Performed by: RADIOLOGY

## 2024-01-30 ENCOUNTER — HOSPITAL ENCOUNTER (OUTPATIENT)
Dept: RADIATION ONCOLOGY | Facility: HOSPITAL | Age: 84
Setting detail: RADIATION/ONCOLOGY SERIES
Discharge: HOME | End: 2024-01-30
Payer: MEDICARE

## 2024-01-30 LAB
RAD ONC ARIA COURSE ID: NORMAL
RAD ONC ARIA COURSE LAST TREATMENT DATE: NORMAL
RAD ONC ARIA COURSE START DATE: NORMAL
RAD ONC ARIA COURSE TREATMENT ELAPSED DAYS: 6
RAD ONC ARIA FIRST TREATMENT DATE: NORMAL
RAD ONC ARIA PLAN FRACTIONS TREATED TO DATE: 2
RAD ONC ARIA PLAN ID: NORMAL
RAD ONC ARIA PLAN PRESCRIBED DOSE PER FRACTION: 10 GY
RAD ONC ARIA PLAN PRIMARY REFERENCE POINT: NORMAL
RAD ONC ARIA PLAN TOTAL FRACTIONS PRESCRIBED: 5
RAD ONC ARIA PLAN TOTAL PRESCRIBED DOSE: 5000 CGY
RAD ONC ARIA REFERENCE POINT DOSAGE GIVEN TO DATE: 20 GY
RAD ONC ARIA REFERENCE POINT ID: NORMAL
RAD ONC ARIA REFERENCE POINT SESSION DOSAGE GIVEN: 10 GY

## 2024-01-30 PROCEDURE — 77373 STRTCTC BDY RAD THER TX DLVR: CPT | Performed by: RADIOLOGY

## 2024-02-01 ENCOUNTER — HOSPITAL ENCOUNTER (OUTPATIENT)
Dept: RADIATION ONCOLOGY | Facility: HOSPITAL | Age: 84
Setting detail: RADIATION/ONCOLOGY SERIES
End: 2024-02-01
Payer: MEDICARE

## 2024-02-01 ENCOUNTER — HOSPITAL ENCOUNTER (OUTPATIENT)
Dept: RADIATION ONCOLOGY | Facility: HOSPITAL | Age: 84
Setting detail: RADIATION/ONCOLOGY SERIES
Discharge: HOME | End: 2024-02-01
Payer: MEDICARE

## 2024-02-01 LAB
RAD ONC ARIA COURSE ID: NORMAL
RAD ONC ARIA COURSE LAST TREATMENT DATE: NORMAL
RAD ONC ARIA COURSE START DATE: NORMAL
RAD ONC ARIA COURSE TREATMENT ELAPSED DAYS: 8
RAD ONC ARIA FIRST TREATMENT DATE: NORMAL
RAD ONC ARIA PLAN FRACTIONS TREATED TO DATE: 3
RAD ONC ARIA PLAN ID: NORMAL
RAD ONC ARIA PLAN PRESCRIBED DOSE PER FRACTION: 10 GY
RAD ONC ARIA PLAN PRIMARY REFERENCE POINT: NORMAL
RAD ONC ARIA PLAN TOTAL FRACTIONS PRESCRIBED: 5
RAD ONC ARIA PLAN TOTAL PRESCRIBED DOSE: 5000 CGY
RAD ONC ARIA REFERENCE POINT DOSAGE GIVEN TO DATE: 30 GY
RAD ONC ARIA REFERENCE POINT ID: NORMAL
RAD ONC ARIA REFERENCE POINT SESSION DOSAGE GIVEN: 10 GY

## 2024-02-01 PROCEDURE — 77336 RADIATION PHYSICS CONSULT: CPT | Performed by: RADIOLOGY

## 2024-02-01 PROCEDURE — 77373 STRTCTC BDY RAD THER TX DLVR: CPT | Performed by: RADIOLOGY

## 2024-02-05 ENCOUNTER — HOSPITAL ENCOUNTER (OUTPATIENT)
Dept: RADIATION ONCOLOGY | Facility: HOSPITAL | Age: 84
Setting detail: RADIATION/ONCOLOGY SERIES
Discharge: HOME | End: 2024-02-05
Payer: MEDICARE

## 2024-02-05 ENCOUNTER — TELEPHONE (OUTPATIENT)
Dept: HEMATOLOGY | Age: 84
End: 2024-02-05

## 2024-02-05 LAB
RAD ONC ARIA COURSE ID: NORMAL
RAD ONC ARIA COURSE LAST TREATMENT DATE: NORMAL
RAD ONC ARIA COURSE START DATE: NORMAL
RAD ONC ARIA COURSE TREATMENT ELAPSED DAYS: 12
RAD ONC ARIA FIRST TREATMENT DATE: NORMAL
RAD ONC ARIA PLAN FRACTIONS TREATED TO DATE: 4
RAD ONC ARIA PLAN ID: NORMAL
RAD ONC ARIA PLAN PRESCRIBED DOSE PER FRACTION: 10 GY
RAD ONC ARIA PLAN PRIMARY REFERENCE POINT: NORMAL
RAD ONC ARIA PLAN TOTAL FRACTIONS PRESCRIBED: 5
RAD ONC ARIA PLAN TOTAL PRESCRIBED DOSE: 5000 CGY
RAD ONC ARIA REFERENCE POINT DOSAGE GIVEN TO DATE: 40 GY
RAD ONC ARIA REFERENCE POINT ID: NORMAL
RAD ONC ARIA REFERENCE POINT SESSION DOSAGE GIVEN: 10 GY

## 2024-02-05 PROCEDURE — 77373 STRTCTC BDY RAD THER TX DLVR: CPT | Performed by: RADIOLOGY

## 2024-02-05 NOTE — TELEPHONE ENCOUNTER
Called pt to remind them of their appt on 2/7/2024 but was unable to leave message or speak to the patient due to invalid number. Was not the PT phone numbe

## 2024-02-05 NOTE — PROGRESS NOTES
the chest on 10/11/2018.   He spoke with Dr. Lazcano indicating that he would not be able to perform the biopsy because the tumor was not accessible, it was under the scapula , which blocked access and therefore the biopsy procedure was canceled.     On the CT scan of the chest on 10/11/2018 measurements of the RUL lung mass was 5.7 x 3.2 cm with irregular margins suspicious for a primary lung neoplasm. Soft tissue associated with the tumor appeared to extend into the bronchus supplying this portion of the RUL of the lung.   Dr. Echevarria indicated that the mass had an endobronchial component and should be easily assessable via bronchoscopy.     The chest CT also included a portion of the upper abdomen where a soft tissue mass was described in an addendum report. In the subhepatic space measuring 4.0 x 3.9 cm, displacing the second portion of the duodenum medially.  A referral was made to Dr. Leslie for consideration for bronchoscopy for biopsy.     On 10/24/2018, Dr. Ozzy Leslie performed a bronchoscopy with EBUS guided biopsy of a 3 cm subcarinal lymph node along with bronchoalveolar lavage of the posterior segment of the RUL of the lung.  The final pathology of the station 7 lymph node only revealed a background of small mature lymphocytes with clusters of histiocytes suggestive of granuloma.  Negative for malignant cells.  Kinyoun and GMS stains were negative for AFB and fungal organisms respectively.  The bronchial washings were negative for malignant cells as well.      Mr. Horner was referred to Dr. Jennifer Bryson at [facility] in [place], [place], for navigational bronchoscopy and biopsy under ultrasound.     On 11/27/2018 Dr. Jennifer Bryson performed a bronchoscopy, navigational protocol, endobronchial ultrasound and biopsy of the RUL of the lung mass along with multiple lymph node station Biopsies.   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

## 2024-02-07 ENCOUNTER — HOSPITAL ENCOUNTER (OUTPATIENT)
Dept: INFUSION THERAPY | Age: 84
Discharge: HOME | End: 2024-02-07
Payer: MEDICARE

## 2024-02-07 ENCOUNTER — HOSPITAL ENCOUNTER (OUTPATIENT)
Dept: RADIATION ONCOLOGY | Facility: HOSPITAL | Age: 84
Setting detail: RADIATION/ONCOLOGY SERIES
Discharge: HOME | End: 2024-02-07
Payer: MEDICARE

## 2024-02-07 ENCOUNTER — OFFICE VISIT (OUTPATIENT)
Dept: HEMATOLOGY | Age: 84
End: 2024-02-07
Payer: MEDICARE

## 2024-02-07 ENCOUNTER — CLINICAL DOCUMENTATION (OUTPATIENT)
Dept: HEMATOLOGY | Age: 84
End: 2024-02-07

## 2024-02-07 VITALS
HEIGHT: 64 IN | TEMPERATURE: 97.9 F | HEART RATE: 89 BPM | DIASTOLIC BLOOD PRESSURE: 80 MMHG | SYSTOLIC BLOOD PRESSURE: 110 MMHG | OXYGEN SATURATION: 96 % | WEIGHT: 176.2 LBS | BODY MASS INDEX: 30.08 KG/M2

## 2024-02-07 DIAGNOSIS — Z92.3 HISTORY OF RADIATION THERAPY: ICD-10-CM

## 2024-02-07 DIAGNOSIS — C34.11 MALIGNANT NEOPLASM OF UPPER LOBE OF RIGHT LUNG: Primary | ICD-10-CM

## 2024-02-07 DIAGNOSIS — C34.11 PRIMARY ADENOCARCINOMA OF UPPER LOBE OF RIGHT LUNG (HCC): ICD-10-CM

## 2024-02-07 DIAGNOSIS — Z45.2 ENCOUNTER FOR CENTRAL LINE CARE: Primary | ICD-10-CM

## 2024-02-07 DIAGNOSIS — C34.11 PRIMARY ADENOCARCINOMA OF UPPER LOBE OF RIGHT LUNG (HCC): Primary | ICD-10-CM

## 2024-02-07 DIAGNOSIS — Z92.3 S/P RADIATION THERAPY: ICD-10-CM

## 2024-02-07 LAB
ALBUMIN SERPL-MCNC: 4.1 G/DL (ref 3.5–5.2)
ALP SERPL-CCNC: 167 U/L (ref 40–130)
ALT SERPL-CCNC: 22 U/L (ref 21–72)
ANION GAP SERPL CALCULATED.3IONS-SCNC: 12 MMOL/L (ref 7–19)
AST SERPL-CCNC: 31 U/L (ref 17–59)
BASOPHILS # BLD: 0.03 K/UL (ref 0.01–0.08)
BASOPHILS NFR BLD: 0.5 % (ref 0.1–1.2)
BILIRUB SERPL-MCNC: 0.5 MG/DL (ref 0.2–1.3)
BUN SERPL-MCNC: 47 MG/DL (ref 9–20)
CALCIUM SERPL-MCNC: 9.7 MG/DL (ref 8.4–10.2)
CHLORIDE SERPL-SCNC: 108 MMOL/L (ref 98–111)
CO2 SERPL-SCNC: 23 MMOL/L (ref 22–29)
CREAT SERPL-MCNC: 2.5 MG/DL (ref 0.6–1.2)
EOSINOPHIL # BLD: 0.14 K/UL (ref 0.04–0.54)
EOSINOPHIL NFR BLD: 2.3 % (ref 0.7–7)
ERYTHROCYTE [DISTWIDTH] IN BLOOD BY AUTOMATED COUNT: 16.3 % (ref 11.6–14.4)
GLOBULIN: 4.3 G/DL
GLUCOSE SERPL-MCNC: 110 MG/DL (ref 74–106)
HCT VFR BLD AUTO: 39.8 % (ref 40.1–51)
HGB BLD-MCNC: 12.5 G/DL (ref 13.7–17.5)
LYMPHOCYTES # BLD: 0.76 K/UL (ref 1.18–3.74)
LYMPHOCYTES NFR BLD: 12.4 % (ref 19.3–53.1)
MCH RBC QN AUTO: 29.1 PG (ref 25.7–32.2)
MCHC RBC AUTO-ENTMCNC: 31.4 G/DL (ref 32.3–36.5)
MCV RBC AUTO: 92.6 FL (ref 79–92.2)
MONOCYTES # BLD: 0.6 K/UL (ref 0.24–0.82)
MONOCYTES NFR BLD: 9.8 % (ref 4.7–12.5)
NEUTROPHILS # BLD: 4.56 K/UL (ref 1.56–6.13)
NEUTS SEG NFR BLD: 74.7 % (ref 34–71.1)
PLATELET # BLD AUTO: 154 K/UL (ref 163–337)
PMV BLD AUTO: 11.6 FL (ref 7.4–10.4)
POTASSIUM SERPL-SCNC: 4.8 MMOL/L (ref 3.5–5.1)
PROT SERPL-MCNC: 8.5 G/DL (ref 6.3–8.2)
RAD ONC ARIA COURSE ID: NORMAL
RAD ONC ARIA COURSE LAST TREATMENT DATE: NORMAL
RAD ONC ARIA COURSE START DATE: NORMAL
RAD ONC ARIA COURSE TREATMENT ELAPSED DAYS: 14
RAD ONC ARIA FIRST TREATMENT DATE: NORMAL
RAD ONC ARIA PLAN FRACTIONS TREATED TO DATE: 5
RAD ONC ARIA PLAN ID: NORMAL
RAD ONC ARIA PLAN PRESCRIBED DOSE PER FRACTION: 10 GY
RAD ONC ARIA PLAN PRIMARY REFERENCE POINT: NORMAL
RAD ONC ARIA PLAN TOTAL FRACTIONS PRESCRIBED: 5
RAD ONC ARIA PLAN TOTAL PRESCRIBED DOSE: 5000 CGY
RAD ONC ARIA REFERENCE POINT DOSAGE GIVEN TO DATE: 50 GY
RAD ONC ARIA REFERENCE POINT ID: NORMAL
RAD ONC ARIA REFERENCE POINT SESSION DOSAGE GIVEN: 10 GY
RBC # BLD AUTO: 4.3 M/UL (ref 4.63–6.08)
SODIUM SERPL-SCNC: 143 MMOL/L (ref 137–145)
WBC # BLD AUTO: 6.11 K/UL (ref 4.23–9.07)

## 2024-02-07 PROCEDURE — 85025 COMPLETE CBC W/AUTO DIFF WBC: CPT

## 2024-02-07 PROCEDURE — G8427 DOCREV CUR MEDS BY ELIG CLIN: HCPCS | Performed by: INTERNAL MEDICINE

## 2024-02-07 PROCEDURE — 3074F SYST BP LT 130 MM HG: CPT | Performed by: INTERNAL MEDICINE

## 2024-02-07 PROCEDURE — 99214 OFFICE O/P EST MOD 30 MIN: CPT | Performed by: INTERNAL MEDICINE

## 2024-02-07 PROCEDURE — 3079F DIAST BP 80-89 MM HG: CPT | Performed by: INTERNAL MEDICINE

## 2024-02-07 PROCEDURE — G8484 FLU IMMUNIZE NO ADMIN: HCPCS | Performed by: INTERNAL MEDICINE

## 2024-02-07 PROCEDURE — G8417 CALC BMI ABV UP PARAM F/U: HCPCS | Performed by: INTERNAL MEDICINE

## 2024-02-07 PROCEDURE — 80053 COMPREHEN METABOLIC PANEL: CPT

## 2024-02-07 PROCEDURE — 77373 STRTCTC BDY RAD THER TX DLVR: CPT | Performed by: RADIOLOGY

## 2024-02-07 PROCEDURE — 2580000003 HC RX 258: Performed by: INTERNAL MEDICINE

## 2024-02-07 PROCEDURE — 36591 DRAW BLOOD OFF VENOUS DEVICE: CPT

## 2024-02-07 PROCEDURE — 1123F ACP DISCUSS/DSCN MKR DOCD: CPT | Performed by: INTERNAL MEDICINE

## 2024-02-07 PROCEDURE — 1036F TOBACCO NON-USER: CPT | Performed by: INTERNAL MEDICINE

## 2024-02-07 PROCEDURE — 6360000002 HC RX W HCPCS: Performed by: INTERNAL MEDICINE

## 2024-02-07 PROCEDURE — 36415 COLL VENOUS BLD VENIPUNCTURE: CPT

## 2024-02-07 RX ORDER — SODIUM CHLORIDE 0.9 % (FLUSH) 0.9 %
20 SYRINGE (ML) INJECTION PRN
Status: DISCONTINUED | OUTPATIENT
Start: 2024-02-07 | End: 2024-02-08 | Stop reason: HOSPADM

## 2024-02-07 RX ORDER — HEPARIN 100 UNIT/ML
500 SYRINGE INTRAVENOUS PRN
Status: DISCONTINUED | OUTPATIENT
Start: 2024-02-07 | End: 2024-02-08 | Stop reason: HOSPADM

## 2024-02-07 RX ORDER — HEPARIN 100 UNIT/ML
500 SYRINGE INTRAVENOUS PRN
OUTPATIENT
Start: 2024-02-07

## 2024-02-07 RX ORDER — ALLOPURINOL 100 MG/1
100 TABLET ORAL DAILY
COMMUNITY

## 2024-02-07 RX ORDER — SODIUM CHLORIDE 0.9 % (FLUSH) 0.9 %
10 SYRINGE (ML) INJECTION PRN
Status: DISCONTINUED | OUTPATIENT
Start: 2024-02-07 | End: 2024-02-08 | Stop reason: HOSPADM

## 2024-02-07 RX ORDER — MECLIZINE HCL 12.5 MG/1
12.5 TABLET ORAL 3 TIMES DAILY
COMMUNITY

## 2024-02-07 RX ORDER — SODIUM CHLORIDE 0.9 % (FLUSH) 0.9 %
20 SYRINGE (ML) INJECTION PRN
OUTPATIENT
Start: 2024-02-07

## 2024-02-07 RX ORDER — SODIUM CHLORIDE 0.9 % (FLUSH) 0.9 %
10 SYRINGE (ML) INJECTION PRN
OUTPATIENT
Start: 2024-02-07

## 2024-02-07 RX ADMIN — HEPARIN 500 UNITS: 100 SYRINGE at 11:43

## 2024-02-07 RX ADMIN — SODIUM CHLORIDE, PRESERVATIVE FREE 20 ML: 5 INJECTION INTRAVENOUS at 11:43

## 2024-02-07 NOTE — PROGRESS NOTES
Spoke with  Evens, advised to drink lots of fluid due to elevated kidney function post radiation. He verbalized understanding and aware to call if he becomes symptomatic and we can bring in for labs. (CBC CMP and possible IVF)

## 2024-03-11 RX ORDER — METOPROLOL SUCCINATE 25 MG/1
25 TABLET, EXTENDED RELEASE ORAL DAILY
Qty: 90 TABLET | Refills: 2 | Status: SHIPPED | OUTPATIENT
Start: 2024-03-11

## 2024-03-20 ENCOUNTER — HOSPITAL ENCOUNTER (OUTPATIENT)
Dept: CT IMAGING | Age: 84
Discharge: HOME | End: 2024-03-20
Payer: MEDICARE

## 2024-03-20 DIAGNOSIS — Z92.3 S/P RADIATION THERAPY: ICD-10-CM

## 2024-03-20 DIAGNOSIS — C34.11 PRIMARY ADENOCARCINOMA OF UPPER LOBE OF RIGHT LUNG (HCC): ICD-10-CM

## 2024-03-20 PROCEDURE — 74176 CT ABD & PELVIS W/O CONTRAST: CPT

## 2024-03-20 PROCEDURE — 71250 CT THORAX DX C-: CPT

## 2024-03-29 NOTE — PROGRESS NOTES
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            TREATMENT SUMMARY  Feraheme 510 mg IV on 2/14 and 2/21/19   Venofer 200 mg IV daily 5/8 - 5/10/2019 as IP at [facility]   s/p 2 units pRBC on 4/26/19 and 2 units pRBC on 4/29/2019 as IP at [facility]?  s/p 2 units pRBC on?5/8/2019   s/p 2 pheresis plts on 5/8/2019  s/p 2 units pRBC as OP 6/26/2019    Allergies:  Penicillins    Medicines:  Current Outpatient Medications   Medication Sig Dispense Refill    metoprolol succinate (TOPROL XL) 25 MG extended release tablet Take 1 tablet by mouth once daily 90 tablet 2    meclizine (ANTIVERT) 12.5 MG tablet Take 1 tablet by mouth 3 times daily      allopurinol (ZYLOPRIM) 100 MG tablet Take 1 tablet by mouth daily      furosemide (LASIX) 20 MG tablet Take 1 tablet by mouth daily      spironolactone (ALDACTONE) 25 MG tablet Take 1 tablet by mouth 2 times daily      losartan (COZAAR) 50 MG tablet Take 1 tablet by mouth daily      melatonin 3 MG TABS tablet Take 10 mg by mouth daily      acetaminophen (TYLENOL) 500 MG tablet Take 1 tablet by mouth 2 times daily      sodium bicarbonate 650 MG tablet Take 1 tablet by mouth 2 times daily      pantoprazole (PROTONIX) 40 MG tablet Take 1 tablet by mouth daily      tamsulosin (FLOMAX) 0.4 MG capsule Take 1 capsule by mouth daily      Multiple Vitamin (MULTI VITAMIN DAILY PO) Take by mouth daily        No current facility-administered medications for this visit.     Facility-Administered Medications Ordered in Other Visits   Medication Dose Route Frequency Provider Last Rate Last Admin    sodium chloride flush 0.9 % injection 10 mL  10 mL IntraVENous PRN Reji Lazcano MD   10 mL at 04/03/24 1124    heparin (PF) 100 UNIT/ML injection 500 Units  500 Units IntraCATHeter PRN Reji Lazcano MD   500 Units at 04/03/24 1124       Past Medical History:      Diagnosis Date    Abnormal weight loss     Anemia

## 2024-04-01 ENCOUNTER — TELEPHONE (OUTPATIENT)
Dept: HEMATOLOGY | Age: 84
End: 2024-04-01

## 2024-04-03 ENCOUNTER — OFFICE VISIT (OUTPATIENT)
Dept: HEMATOLOGY | Age: 84
End: 2024-04-03
Payer: MEDICARE

## 2024-04-03 ENCOUNTER — HOSPITAL ENCOUNTER (OUTPATIENT)
Dept: INFUSION THERAPY | Age: 84
Discharge: HOME | End: 2024-04-03
Payer: MEDICARE

## 2024-04-03 VITALS
HEART RATE: 90 BPM | SYSTOLIC BLOOD PRESSURE: 116 MMHG | OXYGEN SATURATION: 91 % | HEIGHT: 64 IN | WEIGHT: 176 LBS | BODY MASS INDEX: 30.05 KG/M2 | TEMPERATURE: 98.1 F | DIASTOLIC BLOOD PRESSURE: 80 MMHG

## 2024-04-03 DIAGNOSIS — Z45.2 ENCOUNTER FOR CENTRAL LINE CARE: Primary | ICD-10-CM

## 2024-04-03 DIAGNOSIS — C34.11 PRIMARY ADENOCARCINOMA OF UPPER LOBE OF RIGHT LUNG (HCC): Primary | ICD-10-CM

## 2024-04-03 DIAGNOSIS — R91.1 LUNG NODULE: ICD-10-CM

## 2024-04-03 DIAGNOSIS — C34.11 PRIMARY ADENOCARCINOMA OF UPPER LOBE OF RIGHT LUNG (HCC): ICD-10-CM

## 2024-04-03 LAB
ALBUMIN SERPL-MCNC: 4.3 G/DL (ref 3.5–5.2)
ALP SERPL-CCNC: 138 U/L (ref 40–130)
ALT SERPL-CCNC: 22 U/L (ref 21–72)
ANION GAP SERPL CALCULATED.3IONS-SCNC: 13 MMOL/L (ref 7–19)
AST SERPL-CCNC: 32 U/L (ref 17–59)
BASOPHILS # BLD: 0.03 K/UL (ref 0.01–0.08)
BASOPHILS NFR BLD: 0.5 % (ref 0.1–1.2)
BILIRUB SERPL-MCNC: 0.5 MG/DL (ref 0.2–1.3)
BUN SERPL-MCNC: 39 MG/DL (ref 9–20)
CALCIUM SERPL-MCNC: 10 MG/DL (ref 8.4–10.2)
CHLORIDE SERPL-SCNC: 105 MMOL/L (ref 98–111)
CO2 SERPL-SCNC: 24 MMOL/L (ref 22–29)
CREAT SERPL-MCNC: 2.2 MG/DL (ref 0.6–1.2)
EOSINOPHIL # BLD: 0.16 K/UL (ref 0.04–0.54)
EOSINOPHIL NFR BLD: 2.5 % (ref 0.7–7)
ERYTHROCYTE [DISTWIDTH] IN BLOOD BY AUTOMATED COUNT: 17 % (ref 11.6–14.4)
GLOBULIN: 4.1 G/DL
GLUCOSE SERPL-MCNC: 110 MG/DL (ref 74–106)
HCT VFR BLD AUTO: 42.8 % (ref 40.1–51)
HGB BLD-MCNC: 13.4 G/DL (ref 13.7–17.5)
LYMPHOCYTES # BLD: 0.8 K/UL (ref 1.18–3.74)
LYMPHOCYTES NFR BLD: 12.4 % (ref 19.3–53.1)
MCH RBC QN AUTO: 29.6 PG (ref 25.7–32.2)
MCHC RBC AUTO-ENTMCNC: 31.3 G/DL (ref 32.3–36.5)
MCV RBC AUTO: 94.7 FL (ref 79–92.2)
MONOCYTES # BLD: 0.69 K/UL (ref 0.24–0.82)
MONOCYTES NFR BLD: 10.7 % (ref 4.7–12.5)
NEUTROPHILS # BLD: 4.77 K/UL (ref 1.56–6.13)
NEUTS SEG NFR BLD: 73.6 % (ref 34–71.1)
PLATELET # BLD AUTO: 152 K/UL (ref 163–337)
PMV BLD AUTO: 11.6 FL (ref 7.4–10.4)
POTASSIUM SERPL-SCNC: 4.3 MMOL/L (ref 3.5–5.1)
PROT SERPL-MCNC: 8.1 G/DL (ref 6.3–8.2)
RBC # BLD AUTO: 4.52 M/UL (ref 4.63–6.08)
SODIUM SERPL-SCNC: 142 MMOL/L (ref 137–145)
WBC # BLD AUTO: 6.47 K/UL (ref 4.23–9.07)

## 2024-04-03 PROCEDURE — 99212 OFFICE O/P EST SF 10 MIN: CPT

## 2024-04-03 PROCEDURE — 1036F TOBACCO NON-USER: CPT | Performed by: INTERNAL MEDICINE

## 2024-04-03 PROCEDURE — 36415 COLL VENOUS BLD VENIPUNCTURE: CPT

## 2024-04-03 PROCEDURE — 1123F ACP DISCUSS/DSCN MKR DOCD: CPT | Performed by: INTERNAL MEDICINE

## 2024-04-03 PROCEDURE — 85025 COMPLETE CBC W/AUTO DIFF WBC: CPT

## 2024-04-03 PROCEDURE — G8427 DOCREV CUR MEDS BY ELIG CLIN: HCPCS | Performed by: INTERNAL MEDICINE

## 2024-04-03 PROCEDURE — 6360000002 HC RX W HCPCS: Performed by: INTERNAL MEDICINE

## 2024-04-03 PROCEDURE — 99214 OFFICE O/P EST MOD 30 MIN: CPT | Performed by: INTERNAL MEDICINE

## 2024-04-03 PROCEDURE — 3074F SYST BP LT 130 MM HG: CPT | Performed by: INTERNAL MEDICINE

## 2024-04-03 PROCEDURE — 36591 DRAW BLOOD OFF VENOUS DEVICE: CPT

## 2024-04-03 PROCEDURE — 2580000003 HC RX 258: Performed by: INTERNAL MEDICINE

## 2024-04-03 PROCEDURE — 80053 COMPREHEN METABOLIC PANEL: CPT

## 2024-04-03 PROCEDURE — 3079F DIAST BP 80-89 MM HG: CPT | Performed by: INTERNAL MEDICINE

## 2024-04-03 PROCEDURE — G8417 CALC BMI ABV UP PARAM F/U: HCPCS | Performed by: INTERNAL MEDICINE

## 2024-04-03 RX ORDER — SODIUM CHLORIDE 0.9 % (FLUSH) 0.9 %
20 SYRINGE (ML) INJECTION PRN
OUTPATIENT
Start: 2024-04-03

## 2024-04-03 RX ORDER — HEPARIN 100 UNIT/ML
500 SYRINGE INTRAVENOUS PRN
OUTPATIENT
Start: 2024-04-03

## 2024-04-03 RX ORDER — SODIUM CHLORIDE 0.9 % (FLUSH) 0.9 %
10 SYRINGE (ML) INJECTION PRN
OUTPATIENT
Start: 2024-04-03

## 2024-04-03 RX ORDER — HEPARIN 100 UNIT/ML
500 SYRINGE INTRAVENOUS PRN
Status: DISCONTINUED | OUTPATIENT
Start: 2024-04-03 | End: 2024-04-04 | Stop reason: HOSPADM

## 2024-04-03 RX ORDER — SODIUM CHLORIDE 0.9 % (FLUSH) 0.9 %
10 SYRINGE (ML) INJECTION PRN
Status: DISCONTINUED | OUTPATIENT
Start: 2024-04-03 | End: 2024-04-04 | Stop reason: HOSPADM

## 2024-04-03 RX ADMIN — HEPARIN 500 UNITS: 100 SYRINGE at 11:24

## 2024-04-03 RX ADMIN — SODIUM CHLORIDE, PRESERVATIVE FREE 10 ML: 5 INJECTION INTRAVENOUS at 11:24

## 2024-05-01 ENCOUNTER — OFFICE VISIT (OUTPATIENT)
Dept: CARDIOLOGY CLINIC | Age: 84
End: 2024-05-01
Payer: MEDICARE

## 2024-05-01 VITALS
HEART RATE: 93 BPM | BODY MASS INDEX: 30.39 KG/M2 | HEIGHT: 64 IN | OXYGEN SATURATION: 94 % | WEIGHT: 178 LBS | DIASTOLIC BLOOD PRESSURE: 88 MMHG | SYSTOLIC BLOOD PRESSURE: 138 MMHG

## 2024-05-01 DIAGNOSIS — I10 ESSENTIAL HYPERTENSION: ICD-10-CM

## 2024-05-01 DIAGNOSIS — I42.8 NONISCHEMIC CARDIOMYOPATHY (HCC): Primary | ICD-10-CM

## 2024-05-01 PROCEDURE — 3075F SYST BP GE 130 - 139MM HG: CPT | Performed by: NURSE PRACTITIONER

## 2024-05-01 PROCEDURE — G8417 CALC BMI ABV UP PARAM F/U: HCPCS | Performed by: NURSE PRACTITIONER

## 2024-05-01 PROCEDURE — G8427 DOCREV CUR MEDS BY ELIG CLIN: HCPCS | Performed by: NURSE PRACTITIONER

## 2024-05-01 PROCEDURE — 1036F TOBACCO NON-USER: CPT | Performed by: NURSE PRACTITIONER

## 2024-05-01 PROCEDURE — 99214 OFFICE O/P EST MOD 30 MIN: CPT | Performed by: NURSE PRACTITIONER

## 2024-05-01 PROCEDURE — 1123F ACP DISCUSS/DSCN MKR DOCD: CPT | Performed by: NURSE PRACTITIONER

## 2024-05-01 PROCEDURE — 3079F DIAST BP 80-89 MM HG: CPT | Performed by: NURSE PRACTITIONER

## 2024-05-01 PROCEDURE — 93000 ELECTROCARDIOGRAM COMPLETE: CPT | Performed by: NURSE PRACTITIONER

## 2024-05-01 ASSESSMENT — ENCOUNTER SYMPTOMS
SORE THROAT: 0
CHEST TIGHTNESS: 0
SHORTNESS OF BREATH: 1
WHEEZING: 0
COUGH: 0

## 2024-05-01 NOTE — PROGRESS NOTES
normal respiratory effort.  ABDOMEN  - soft, non tender, no rebound  MUSCULOSKELETAL  - range of motion of the upper and lower extermites appears normal and equal and is without pain   EXTREMITIES - no significant edema   NEUROLOGIC - gait and station are normal  SKIN - turgor is normal, can warm and dry.  PSYCHIATRIC - normal mood and affect, alert and orientated x 3,      ASSESSMENT:    ALL THE CARDIOLOGY PROBLEMS ARE LISTED ABOVE; HOWEVER, THE FOLLOWING SPECIFIC CARDIAC PROBLEMS / CONDITIONS WERE ADDRESSED AND TREATED DURING THE OFFICE VISIT TODAY:                                                                                            MEDICAL DECISION MAKING             Cardiac Specific Problem / Diagnosis  Discussion and Data Reviewed Diagnostic Procedures Ordered Management Options Selected           1. Nonischemic cardiomyopathy  Stable   Review and summation of old records:    No overt signs of failure.  Patient is on Toprol, losartan , Aldactone and Lasix.    EKG in the office today showing sinus rhythm with a heart rate of 93 bpm. Yes Continue current medications:     Yes           2. Lulu hypertension  Stable   Blood pressure in the office today 138/88.  O2 sat 94%.  Patient is on Toprol XL, losartan, Lasix and Aldactone. No Continue current medications:    Yes                                     Orders Placed This Encounter   Procedures    EKG 12 lead     Order Specific Question:   Reason for Exam?     Answer:   Other     No orders of the defined types were placed in this encounter.      Discussed with patient.    Return in about 6 months (around 11/1/2024) for Routine with Angie .    I greatly appreciate the opportunity to meet Sophia Horner and your confidence in allowing me to participate in his cardiovascular care.    ALEX Beard NP  5/1/2024 9:52 AM CDT                    This dictation was generated by voice recognition computer software. Although all attempts are made to edit

## 2024-05-06 ENCOUNTER — HOSPITAL ENCOUNTER (OUTPATIENT)
Dept: CT IMAGING | Facility: HOSPITAL | Age: 84
Discharge: HOME | End: 2024-05-06
Payer: MEDICARE

## 2024-05-06 DIAGNOSIS — C34.11 MALIGNANT NEOPLASM OF UPPER LOBE OF RIGHT LUNG: ICD-10-CM

## 2024-05-06 DIAGNOSIS — Z92.3 HISTORY OF RADIATION THERAPY: ICD-10-CM

## 2024-05-06 PROCEDURE — 71250 CT THORAX DX C-: CPT

## 2024-05-08 ENCOUNTER — HOSPITAL ENCOUNTER (OUTPATIENT)
Dept: RADIATION ONCOLOGY | Facility: HOSPITAL | Age: 84
Setting detail: RADIATION/ONCOLOGY SERIES
End: 2024-05-08
Payer: MEDICARE

## 2024-05-09 ENCOUNTER — OFFICE VISIT (OUTPATIENT)
Age: 84
End: 2024-05-09
Payer: MEDICARE

## 2024-05-09 VITALS
SYSTOLIC BLOOD PRESSURE: 124 MMHG | OXYGEN SATURATION: 93 % | BODY MASS INDEX: 29.53 KG/M2 | HEART RATE: 86 BPM | HEIGHT: 64 IN | WEIGHT: 173 LBS | DIASTOLIC BLOOD PRESSURE: 67 MMHG

## 2024-05-09 DIAGNOSIS — Z87.891 FORMER SMOKER: ICD-10-CM

## 2024-05-09 DIAGNOSIS — C34.11 MALIGNANT NEOPLASM OF UPPER LOBE OF RIGHT LUNG: Primary | ICD-10-CM

## 2024-05-09 DIAGNOSIS — Z92.3 HISTORY OF RADIATION THERAPY: ICD-10-CM

## 2024-05-09 PROCEDURE — G0463 HOSPITAL OUTPT CLINIC VISIT: HCPCS | Performed by: RADIOLOGY

## 2024-05-09 RX ORDER — FLUTICASONE PROPIONATE 50 MCG
1 SPRAY, SUSPENSION (ML) NASAL DAILY
COMMUNITY

## 2024-05-15 ENCOUNTER — HOSPITAL ENCOUNTER (OUTPATIENT)
Dept: INFUSION THERAPY | Age: 84
Discharge: HOME | End: 2024-05-15
Payer: MEDICARE

## 2024-05-15 DIAGNOSIS — Z45.2 ENCOUNTER FOR CENTRAL LINE CARE: Primary | ICD-10-CM

## 2024-05-15 PROCEDURE — 6360000002 HC RX W HCPCS: Performed by: INTERNAL MEDICINE

## 2024-05-15 PROCEDURE — 96523 IRRIG DRUG DELIVERY DEVICE: CPT

## 2024-05-15 PROCEDURE — 2580000003 HC RX 258: Performed by: INTERNAL MEDICINE

## 2024-05-15 RX ORDER — HEPARIN 100 UNIT/ML
500 SYRINGE INTRAVENOUS PRN
OUTPATIENT
Start: 2024-05-15

## 2024-05-15 RX ORDER — SODIUM CHLORIDE 0.9 % (FLUSH) 0.9 %
20 SYRINGE (ML) INJECTION PRN
OUTPATIENT
Start: 2024-05-15

## 2024-05-15 RX ORDER — HEPARIN 100 UNIT/ML
500 SYRINGE INTRAVENOUS PRN
Status: DISCONTINUED | OUTPATIENT
Start: 2024-05-15 | End: 2024-05-16 | Stop reason: HOSPADM

## 2024-05-15 RX ORDER — SODIUM CHLORIDE 0.9 % (FLUSH) 0.9 %
10 SYRINGE (ML) INJECTION PRN
OUTPATIENT
Start: 2024-05-15

## 2024-05-15 RX ORDER — SODIUM CHLORIDE 0.9 % (FLUSH) 0.9 %
10 SYRINGE (ML) INJECTION PRN
Status: DISCONTINUED | OUTPATIENT
Start: 2024-05-15 | End: 2024-05-16 | Stop reason: HOSPADM

## 2024-05-15 RX ADMIN — SODIUM CHLORIDE, PRESERVATIVE FREE 20 ML: 5 INJECTION INTRAVENOUS at 11:21

## 2024-05-15 RX ADMIN — HEPARIN 1000 UNITS: 100 SYRINGE at 11:20

## 2024-06-29 ENCOUNTER — APPOINTMENT (OUTPATIENT)
Dept: GENERAL RADIOLOGY | Facility: HOSPITAL | Age: 84
End: 2024-06-29
Payer: MEDICARE

## 2024-06-29 ENCOUNTER — HOSPITAL ENCOUNTER (INPATIENT)
Facility: HOSPITAL | Age: 84
LOS: 5 days | Discharge: HOME | End: 2024-07-04
Attending: FAMILY MEDICINE | Admitting: FAMILY MEDICINE
Payer: MEDICARE

## 2024-06-29 ENCOUNTER — APPOINTMENT (OUTPATIENT)
Dept: CT IMAGING | Facility: HOSPITAL | Age: 84
End: 2024-06-29
Payer: MEDICARE

## 2024-06-29 DIAGNOSIS — N17.9 ACUTE KIDNEY INJURY SUPERIMPOSED ON CHRONIC KIDNEY DISEASE: ICD-10-CM

## 2024-06-29 DIAGNOSIS — I51.7 CARDIOMEGALY: ICD-10-CM

## 2024-06-29 DIAGNOSIS — J84.10 PULMONARY FIBROSIS: ICD-10-CM

## 2024-06-29 DIAGNOSIS — J18.9 PNEUMONIA OF RIGHT LUNG DUE TO INFECTIOUS ORGANISM, UNSPECIFIED PART OF LUNG: Primary | ICD-10-CM

## 2024-06-29 DIAGNOSIS — R79.89 ELEVATED TROPONIN: ICD-10-CM

## 2024-06-29 DIAGNOSIS — N18.9 ACUTE KIDNEY INJURY SUPERIMPOSED ON CHRONIC KIDNEY DISEASE: ICD-10-CM

## 2024-06-29 PROBLEM — R07.9 CHEST PAIN: Status: ACTIVE | Noted: 2024-06-29

## 2024-06-29 LAB
ALBUMIN SERPL-MCNC: 3.8 G/DL (ref 3.5–5.2)
ALBUMIN/GLOB SERPL: 1.1 G/DL
ALP SERPL-CCNC: 119 U/L (ref 39–117)
ALT SERPL W P-5'-P-CCNC: 21 U/L (ref 1–41)
ANION GAP SERPL CALCULATED.3IONS-SCNC: 11 MMOL/L (ref 5–15)
APTT PPP: 31.4 SECONDS (ref 24.5–36)
AST SERPL-CCNC: 22 U/L (ref 1–40)
ATMOSPHERIC PRESS: 752 MMHG
B PARAPERT DNA SPEC QL NAA+PROBE: NOT DETECTED
B PERT DNA SPEC QL NAA+PROBE: NOT DETECTED
BASE EXCESS BLDV CALC-SCNC: -1.9 MMOL/L (ref 0–2)
BASOPHILS # BLD AUTO: 0.04 10*3/MM3 (ref 0–0.2)
BASOPHILS NFR BLD AUTO: 0.5 % (ref 0–1.5)
BDY SITE: ABNORMAL
BILIRUB SERPL-MCNC: 0.4 MG/DL (ref 0–1.2)
BILIRUB UR QL STRIP: NEGATIVE
BODY TEMPERATURE: 37
BUN SERPL-MCNC: 61 MG/DL (ref 8–23)
BUN/CREAT SERPL: 22 (ref 7–25)
C PNEUM DNA NPH QL NAA+NON-PROBE: NOT DETECTED
CALCIUM SPEC-SCNC: 10.4 MG/DL (ref 8.6–10.5)
CHLORIDE SERPL-SCNC: 103 MMOL/L (ref 98–107)
CLARITY UR: CLEAR
CO2 SERPL-SCNC: 23 MMOL/L (ref 22–29)
COHGB MFR BLD: 1.1 % (ref 0–5)
COLOR UR: YELLOW
CREAT SERPL-MCNC: 2.77 MG/DL (ref 0.76–1.27)
D-LACTATE SERPL-SCNC: 1.7 MMOL/L (ref 0.5–2)
DEPRECATED RDW RBC AUTO: 60.3 FL (ref 37–54)
EGFRCR SERPLBLD CKD-EPI 2021: 22.1 ML/MIN/1.73
EOSINOPHIL # BLD AUTO: 0.12 10*3/MM3 (ref 0–0.4)
EOSINOPHIL NFR BLD AUTO: 1.6 % (ref 0.3–6.2)
ERYTHROCYTE [DISTWIDTH] IN BLOOD BY AUTOMATED COUNT: 17 % (ref 12.3–15.4)
FLUAV SUBTYP SPEC NAA+PROBE: NOT DETECTED
FLUBV RNA ISLT QL NAA+PROBE: NOT DETECTED
GEN 5 2HR TROPONIN T REFLEX: 125 NG/L
GLOBULIN UR ELPH-MCNC: 3.4 GM/DL
GLUCOSE SERPL-MCNC: 94 MG/DL (ref 65–99)
GLUCOSE UR STRIP-MCNC: NEGATIVE MG/DL
HADV DNA SPEC NAA+PROBE: NOT DETECTED
HCO3 BLDV-SCNC: 24.2 MMOL/L (ref 22–28)
HCOV 229E RNA SPEC QL NAA+PROBE: NOT DETECTED
HCOV HKU1 RNA SPEC QL NAA+PROBE: NOT DETECTED
HCOV NL63 RNA SPEC QL NAA+PROBE: NOT DETECTED
HCOV OC43 RNA SPEC QL NAA+PROBE: NOT DETECTED
HCT VFR BLD AUTO: 44 % (ref 37.5–51)
HGB BLD-MCNC: 13.8 G/DL (ref 13–17.7)
HGB BLDA-MCNC: 14.9 G/DL (ref 14–18)
HGB UR QL STRIP.AUTO: NEGATIVE
HMPV RNA NPH QL NAA+NON-PROBE: NOT DETECTED
HPIV1 RNA ISLT QL NAA+PROBE: NOT DETECTED
HPIV2 RNA SPEC QL NAA+PROBE: NOT DETECTED
HPIV3 RNA NPH QL NAA+PROBE: NOT DETECTED
HPIV4 P GENE NPH QL NAA+PROBE: NOT DETECTED
IMM GRANULOCYTES # BLD AUTO: 0.11 10*3/MM3 (ref 0–0.05)
IMM GRANULOCYTES NFR BLD AUTO: 1.5 % (ref 0–0.5)
INHALED O2 CONCENTRATION: 21 %
INR PPP: 1.04 (ref 0.91–1.09)
KETONES UR QL STRIP: NEGATIVE
LEUKOCYTE ESTERASE UR QL STRIP.AUTO: NEGATIVE
LYMPHOCYTES # BLD AUTO: 0.83 10*3/MM3 (ref 0.7–3.1)
LYMPHOCYTES NFR BLD AUTO: 11.2 % (ref 19.6–45.3)
Lab: ABNORMAL
M PNEUMO IGG SER IA-ACNC: NOT DETECTED
MAGNESIUM SERPL-MCNC: 1.8 MG/DL (ref 1.6–2.4)
MCH RBC QN AUTO: 30.4 PG (ref 26.6–33)
MCHC RBC AUTO-ENTMCNC: 31.4 G/DL (ref 31.5–35.7)
MCV RBC AUTO: 96.9 FL (ref 79–97)
METHGB BLD QL: 0.5 % (ref 0–3)
MODALITY: ABNORMAL
MONOCYTES # BLD AUTO: 0.8 10*3/MM3 (ref 0.1–0.9)
MONOCYTES NFR BLD AUTO: 10.8 % (ref 5–12)
NEUTROPHILS NFR BLD AUTO: 5.49 10*3/MM3 (ref 1.7–7)
NEUTROPHILS NFR BLD AUTO: 74.4 % (ref 42.7–76)
NITRITE UR QL STRIP: NEGATIVE
NRBC BLD AUTO-RTO: 0 /100 WBC (ref 0–0.2)
NT-PROBNP SERPL-MCNC: 828.6 PG/ML (ref 0–1800)
OXYHGB MFR BLDV: 24 % (ref 60–85)
PCO2 BLDV: 44.8 MM HG (ref 41–51)
PH BLDV: 7.34 PH UNITS (ref 7.32–7.42)
PH UR STRIP.AUTO: 6.5 [PH] (ref 5–8)
PLATELET # BLD AUTO: 181 10*3/MM3 (ref 140–450)
PMV BLD AUTO: 11.4 FL (ref 6–12)
PO2 BLDV: 17.8 MM HG (ref 27–53)
POTASSIUM BLDV-SCNC: 4.8 MMOL/L (ref 3.5–5.2)
POTASSIUM SERPL-SCNC: 4.9 MMOL/L (ref 3.5–5.2)
PROCALCITONIN SERPL-MCNC: 0.11 NG/ML (ref 0–0.25)
PROT SERPL-MCNC: 7.2 G/DL (ref 6–8.5)
PROT UR QL STRIP: NEGATIVE
PROTHROMBIN TIME: 14.1 SECONDS (ref 11.8–14.8)
RBC # BLD AUTO: 4.54 10*6/MM3 (ref 4.14–5.8)
RHINOVIRUS RNA SPEC NAA+PROBE: NOT DETECTED
RSV RNA NPH QL NAA+NON-PROBE: NOT DETECTED
SAO2 % BLDCOV: 24.4 % (ref 45–75)
SARS-COV-2 RNA NPH QL NAA+NON-PROBE: NOT DETECTED
SODIUM BLDV-SCNC: 138 MMOL/L (ref 136–145)
SODIUM SERPL-SCNC: 137 MMOL/L (ref 136–145)
SP GR UR STRIP: 1.01 (ref 1–1.03)
T4 FREE SERPL-MCNC: 1.53 NG/DL (ref 0.93–1.7)
TROPONIN T DELTA: 9 NG/L
TROPONIN T SERPL HS-MCNC: 116 NG/L
TSH SERPL DL<=0.05 MIU/L-ACNC: 2.35 UIU/ML (ref 0.27–4.2)
UROBILINOGEN UR QL STRIP: NORMAL
VENTILATOR MODE: ABNORMAL
WBC NRBC COR # BLD AUTO: 7.39 10*3/MM3 (ref 3.4–10.8)

## 2024-06-29 PROCEDURE — 81003 URINALYSIS AUTO W/O SCOPE: CPT | Performed by: PHYSICIAN ASSISTANT

## 2024-06-29 PROCEDURE — 74176 CT ABD & PELVIS W/O CONTRAST: CPT

## 2024-06-29 PROCEDURE — 87040 BLOOD CULTURE FOR BACTERIA: CPT | Performed by: PHYSICIAN ASSISTANT

## 2024-06-29 PROCEDURE — 83880 ASSAY OF NATRIURETIC PEPTIDE: CPT | Performed by: PHYSICIAN ASSISTANT

## 2024-06-29 PROCEDURE — 84439 ASSAY OF FREE THYROXINE: CPT | Performed by: PHYSICIAN ASSISTANT

## 2024-06-29 PROCEDURE — 82820 HEMOGLOBIN-OXYGEN AFFINITY: CPT

## 2024-06-29 PROCEDURE — 71250 CT THORAX DX C-: CPT

## 2024-06-29 PROCEDURE — 93005 ELECTROCARDIOGRAM TRACING: CPT | Performed by: FAMILY MEDICINE

## 2024-06-29 PROCEDURE — 84145 PROCALCITONIN (PCT): CPT | Performed by: PHYSICIAN ASSISTANT

## 2024-06-29 PROCEDURE — 25810000003 SODIUM CHLORIDE 0.9 % SOLUTION: Performed by: FAMILY MEDICINE

## 2024-06-29 PROCEDURE — 83735 ASSAY OF MAGNESIUM: CPT | Performed by: PHYSICIAN ASSISTANT

## 2024-06-29 PROCEDURE — 93005 ELECTROCARDIOGRAM TRACING: CPT

## 2024-06-29 PROCEDURE — 85025 COMPLETE CBC W/AUTO DIFF WBC: CPT | Performed by: PHYSICIAN ASSISTANT

## 2024-06-29 PROCEDURE — 25010000002 CEFTRIAXONE PER 250 MG: Performed by: PHYSICIAN ASSISTANT

## 2024-06-29 PROCEDURE — 84443 ASSAY THYROID STIM HORMONE: CPT | Performed by: PHYSICIAN ASSISTANT

## 2024-06-29 PROCEDURE — 71045 X-RAY EXAM CHEST 1 VIEW: CPT

## 2024-06-29 PROCEDURE — 0202U NFCT DS 22 TRGT SARS-COV-2: CPT | Performed by: PHYSICIAN ASSISTANT

## 2024-06-29 PROCEDURE — 85610 PROTHROMBIN TIME: CPT | Performed by: PHYSICIAN ASSISTANT

## 2024-06-29 PROCEDURE — 85730 THROMBOPLASTIN TIME PARTIAL: CPT | Performed by: PHYSICIAN ASSISTANT

## 2024-06-29 PROCEDURE — 83605 ASSAY OF LACTIC ACID: CPT | Performed by: PHYSICIAN ASSISTANT

## 2024-06-29 PROCEDURE — 99285 EMERGENCY DEPT VISIT HI MDM: CPT

## 2024-06-29 PROCEDURE — 84484 ASSAY OF TROPONIN QUANT: CPT | Performed by: PHYSICIAN ASSISTANT

## 2024-06-29 PROCEDURE — 93010 ELECTROCARDIOGRAM REPORT: CPT | Performed by: EMERGENCY MEDICINE

## 2024-06-29 PROCEDURE — 36415 COLL VENOUS BLD VENIPUNCTURE: CPT

## 2024-06-29 PROCEDURE — 25010000002 ENOXAPARIN PER 10 MG: Performed by: FAMILY MEDICINE

## 2024-06-29 PROCEDURE — 82805 BLOOD GASES W/O2 SATURATION: CPT

## 2024-06-29 PROCEDURE — 80053 COMPREHEN METABOLIC PANEL: CPT | Performed by: PHYSICIAN ASSISTANT

## 2024-06-29 RX ORDER — ACETAMINOPHEN 650 MG/1
650 SUPPOSITORY RECTAL EVERY 4 HOURS PRN
Status: DISCONTINUED | OUTPATIENT
Start: 2024-06-29 | End: 2024-07-04 | Stop reason: HOSPADM

## 2024-06-29 RX ORDER — UREA 10 %
10 LOTION (ML) TOPICAL NIGHTLY
Status: DISCONTINUED | OUTPATIENT
Start: 2024-06-29 | End: 2024-07-03

## 2024-06-29 RX ORDER — PREDNISONE 20 MG/1
40 TABLET ORAL
Status: DISCONTINUED | OUTPATIENT
Start: 2024-06-29 | End: 2024-07-03

## 2024-06-29 RX ORDER — ENOXAPARIN SODIUM 100 MG/ML
30 INJECTION SUBCUTANEOUS DAILY
Status: DISCONTINUED | OUTPATIENT
Start: 2024-06-29 | End: 2024-07-04 | Stop reason: HOSPADM

## 2024-06-29 RX ORDER — BISACODYL 5 MG/1
5 TABLET, DELAYED RELEASE ORAL DAILY PRN
Status: DISCONTINUED | OUTPATIENT
Start: 2024-06-29 | End: 2024-07-01

## 2024-06-29 RX ORDER — MULTIPLE VITAMINS W/ MINERALS TAB 9MG-400MCG
1 TAB ORAL DAILY
Status: DISCONTINUED | OUTPATIENT
Start: 2024-06-29 | End: 2024-07-04 | Stop reason: HOSPADM

## 2024-06-29 RX ORDER — AMOXICILLIN 250 MG
2 CAPSULE ORAL 2 TIMES DAILY PRN
Status: DISCONTINUED | OUTPATIENT
Start: 2024-06-29 | End: 2024-07-01

## 2024-06-29 RX ORDER — MECLIZINE HYDROCHLORIDE 25 MG/1
12.5 TABLET ORAL 3 TIMES DAILY PRN
Status: DISCONTINUED | OUTPATIENT
Start: 2024-06-29 | End: 2024-07-04 | Stop reason: HOSPADM

## 2024-06-29 RX ORDER — SODIUM CHLORIDE 9 MG/ML
40 INJECTION, SOLUTION INTRAVENOUS AS NEEDED
Status: DISCONTINUED | OUTPATIENT
Start: 2024-06-29 | End: 2024-07-04 | Stop reason: HOSPADM

## 2024-06-29 RX ORDER — BISACODYL 10 MG
10 SUPPOSITORY, RECTAL RECTAL DAILY PRN
Status: DISCONTINUED | OUTPATIENT
Start: 2024-06-29 | End: 2024-07-01

## 2024-06-29 RX ORDER — ATORVASTATIN CALCIUM 10 MG/1
20 TABLET, FILM COATED ORAL NIGHTLY
Status: DISCONTINUED | OUTPATIENT
Start: 2024-06-29 | End: 2024-07-04 | Stop reason: HOSPADM

## 2024-06-29 RX ORDER — MONTELUKAST SODIUM 10 MG/1
10 TABLET ORAL NIGHTLY
COMMUNITY

## 2024-06-29 RX ORDER — MONTELUKAST SODIUM 10 MG/1
10 TABLET ORAL NIGHTLY
Status: DISCONTINUED | OUTPATIENT
Start: 2024-06-29 | End: 2024-07-04 | Stop reason: HOSPADM

## 2024-06-29 RX ORDER — ACETAMINOPHEN 500 MG
1000 TABLET ORAL NIGHTLY
Status: DISCONTINUED | OUTPATIENT
Start: 2024-06-29 | End: 2024-07-04 | Stop reason: HOSPADM

## 2024-06-29 RX ORDER — PANTOPRAZOLE SODIUM 40 MG/1
40 TABLET, DELAYED RELEASE ORAL DAILY
Status: DISCONTINUED | OUTPATIENT
Start: 2024-06-29 | End: 2024-07-01

## 2024-06-29 RX ORDER — ALLOPURINOL 100 MG/1
100 TABLET ORAL DAILY
Status: DISCONTINUED | OUTPATIENT
Start: 2024-06-29 | End: 2024-07-04 | Stop reason: HOSPADM

## 2024-06-29 RX ORDER — TRAMADOL HYDROCHLORIDE 50 MG/1
50 TABLET ORAL EVERY 6 HOURS PRN
Status: DISCONTINUED | OUTPATIENT
Start: 2024-06-29 | End: 2024-07-04 | Stop reason: HOSPADM

## 2024-06-29 RX ORDER — POLYETHYLENE GLYCOL 3350 17 G/17G
17 POWDER, FOR SOLUTION ORAL DAILY PRN
Status: DISCONTINUED | OUTPATIENT
Start: 2024-06-29 | End: 2024-07-01

## 2024-06-29 RX ORDER — SPIRONOLACTONE 25 MG/1
25 TABLET ORAL DAILY
COMMUNITY
End: 2024-07-04 | Stop reason: HOSPADM

## 2024-06-29 RX ORDER — SODIUM CHLORIDE 0.9 % (FLUSH) 0.9 %
10 SYRINGE (ML) INJECTION AS NEEDED
Status: DISCONTINUED | OUTPATIENT
Start: 2024-06-29 | End: 2024-07-03 | Stop reason: SDUPTHER

## 2024-06-29 RX ORDER — SODIUM BICARBONATE 650 MG/1
650 TABLET ORAL 3 TIMES DAILY
Status: DISCONTINUED | OUTPATIENT
Start: 2024-06-29 | End: 2024-07-04 | Stop reason: HOSPADM

## 2024-06-29 RX ORDER — SODIUM CHLORIDE 0.9 % (FLUSH) 0.9 %
10 SYRINGE (ML) INJECTION EVERY 12 HOURS SCHEDULED
Status: DISCONTINUED | OUTPATIENT
Start: 2024-06-29 | End: 2024-07-04 | Stop reason: HOSPADM

## 2024-06-29 RX ORDER — SODIUM CHLORIDE 0.9 % (FLUSH) 0.9 %
10 SYRINGE (ML) INJECTION AS NEEDED
Status: DISCONTINUED | OUTPATIENT
Start: 2024-06-29 | End: 2024-07-04 | Stop reason: HOSPADM

## 2024-06-29 RX ORDER — FLUTICASONE PROPIONATE 50 MCG
1 SPRAY, SUSPENSION (ML) NASAL DAILY
Status: DISCONTINUED | OUTPATIENT
Start: 2024-06-29 | End: 2024-07-04 | Stop reason: HOSPADM

## 2024-06-29 RX ORDER — SODIUM CHLORIDE 9 MG/ML
50 INJECTION, SOLUTION INTRAVENOUS CONTINUOUS
Status: DISPENSED | OUTPATIENT
Start: 2024-06-29 | End: 2024-06-30

## 2024-06-29 RX ORDER — ASPIRIN 81 MG/1
81 TABLET ORAL DAILY
Status: DISCONTINUED | OUTPATIENT
Start: 2024-06-29 | End: 2024-07-04 | Stop reason: HOSPADM

## 2024-06-29 RX ORDER — METOPROLOL SUCCINATE 25 MG/1
25 TABLET, EXTENDED RELEASE ORAL DAILY
Status: DISCONTINUED | OUTPATIENT
Start: 2024-06-29 | End: 2024-07-03

## 2024-06-29 RX ORDER — ACETAMINOPHEN 325 MG/1
650 TABLET ORAL EVERY 4 HOURS PRN
Status: DISCONTINUED | OUTPATIENT
Start: 2024-06-29 | End: 2024-07-04 | Stop reason: HOSPADM

## 2024-06-29 RX ORDER — TAMSULOSIN HYDROCHLORIDE 0.4 MG/1
0.4 CAPSULE ORAL NIGHTLY
Status: DISCONTINUED | OUTPATIENT
Start: 2024-06-29 | End: 2024-07-04 | Stop reason: HOSPADM

## 2024-06-29 RX ORDER — ACETAMINOPHEN 160 MG/5ML
650 SOLUTION ORAL EVERY 4 HOURS PRN
Status: DISCONTINUED | OUTPATIENT
Start: 2024-06-29 | End: 2024-07-04 | Stop reason: HOSPADM

## 2024-06-29 RX ADMIN — SODIUM CHLORIDE 50 ML/HR: 9 INJECTION, SOLUTION INTRAVENOUS at 17:51

## 2024-06-29 RX ADMIN — TAMSULOSIN HYDROCHLORIDE 0.4 MG: 0.4 CAPSULE ORAL at 21:07

## 2024-06-29 RX ADMIN — Medication 10 ML: at 21:07

## 2024-06-29 RX ADMIN — PANTOPRAZOLE SODIUM 40 MG: 40 TABLET, DELAYED RELEASE ORAL at 17:49

## 2024-06-29 RX ADMIN — ACETAMINOPHEN 1000 MG: 500 TABLET, FILM COATED ORAL at 21:06

## 2024-06-29 RX ADMIN — Medication 10 MG: at 21:06

## 2024-06-29 RX ADMIN — SODIUM BICARBONATE 650 MG: 650 TABLET ORAL at 21:06

## 2024-06-29 RX ADMIN — ENOXAPARIN SODIUM 30 MG: 100 INJECTION SUBCUTANEOUS at 17:49

## 2024-06-29 RX ADMIN — ATORVASTATIN CALCIUM 20 MG: 10 TABLET, FILM COATED ORAL at 21:06

## 2024-06-29 RX ADMIN — ASPIRIN 81 MG: 81 TABLET, COATED ORAL at 17:49

## 2024-06-29 RX ADMIN — SODIUM CHLORIDE 1000 MG: 900 INJECTION INTRAVENOUS at 11:16

## 2024-06-29 RX ADMIN — MONTELUKAST SODIUM 10 MG: 10 TABLET, FILM COATED ORAL at 21:07

## 2024-06-29 RX ADMIN — SODIUM BICARBONATE 650 MG: 650 TABLET ORAL at 18:07

## 2024-06-29 NOTE — H&P
[facility] Medicine Services  HISTORY AND PHYSICAL    Date of Admission: 6/29/2024  Primary Care Physician: Irving Alexis MD    Subjective   Primary Historian: Patient    Chief Complaint: Chest pain    History of Present Illness  84 year old male with past medical history of stage IV lung cancer, A-fib, CKD stage III, hypertension that presents to the emergency room with complaints of mid chest pain for several weeks, radiating to back. Worse and not relieved today decided to come to the ER. Blood work shows elevated troponin, which is not a new finding. Also higher than baseline creatinine.     [facility] 3/19/2019 [facility]  Summary:    1.  Successful femoral artery ultrasound   2.  Successful femoral artery arteriogram   3.  Supervision of the administration of moderate conscious   sedation   4. Mild coronary artery disease   5.  Left ventricular function is normal   6.  Terminal portion of the circumflex / OM3 is diffusely   diseased > 80% and very small, < 1 mm in diameter.     Review of Systems   Otherwise complete ROS reviewed and negative except as mentioned in the HPI.    Past Medical History:   Past Medical History:   Diagnosis Date    Arthritis     Atrial fibrillation with rapid ventricular response 05/31/2019    Blindness of left eye     BPH with obstruction/lower urinary tract symptoms     Cancer     stomach & lung    CHF (congestive heart failure)     CKD (chronic kidney disease) stage 3, GFR 30-59 ml/min     Coronary artery disease     Elevated cholesterol     Essential hypertension 10/02/2017    Fibrotic lung diseases     GERD (gastroesophageal reflux disease)     Hearing loss     History of transfusion     Hydronephrosis of left kidney     Hyperlipidemia     Hypertension     pt was taken off of all of his medications for BP (atenolol, lisinopril, lasix) because his BP kept bottoming out so his primary dr told him to discontinue them 1-2 months ago (Jan/Feb  2019). pt states he takes no medications currently.    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 Patel is just going to keep an eye on it and not do surgery right now.    Mediastinal adenopathy 10/24/2018    [facility]    Monoclonal gammopathy of unknown significance (MGUS) 09/11/2018    Pancreatic mass     pt states he had this in 2013 but it went away on its own. Now recent CT shows it has come back so he is going to have an ultrasound on 3/13/19.    Shortness of breath      Past Surgical History:  Past Surgical History:   Procedure Laterality Date    ABDOMINAL SURGERY      BRONCHOSCOPY N/A 10/24/2018    Procedure: BRONCHOSCOPY WITH BIOPSY, EBUS;  Surgeon: Gareth Becerra MD;  Location: [facility] OR;  Service: Pulmonary    CHOLECYSTECTOMY      COLONOSCOPY N/A 1/3/2019    Procedure: COLONOSCOPY WITH ANESTHESIA;  Surgeon: Randy Somers DO;  Location: [facility] ENDOSCOPY;  Service: Gastroenterology    CYSTOSCOPY, RETROGRADE PYELOGRAM AND STENT INSERTION Left 3/8/2019    Procedure: CYSTOSCOPY RETROGRADE BILATERAL PYELOGRAM;  Surgeon: Jos Sylvester MD;  Location: [facility] OR;  Service: Urology    ENDOSCOPY N/A 12/11/2018    Procedure: ESOPHAGOGASTRODUODENOSCOPY WITH ANESTHESIA;  Surgeon: Randy Somers DO;  Location: [facility] ENDOSCOPY;  Service: Gastroenterology    ENDOSCOPY N/A 4/29/2019    Procedure: ESOPHAGOGASTRODUODENOSCOPY WITH ANESTHESIA;  Surgeon: Lilliam Jj MD;  Location: [facility] OR;  Service: Gastroenterology    ENDOSCOPY N/A 5/9/2019    Procedure: ESOPHAGOGASTRODUODENOSCOPY WITH ANESTHESIA;  Surgeon: Pilo Bansal MD;  Location: [facility] ENDOSCOPY;  Service: Gastroenterology    EYE SURGERY Left 1964    FOOT SURGERY Right 1966    joint    FRACTURE SURGERY       Social History:  reports that he quit smoking about 20 years ago. His smoking  use included cigarettes. He started smoking about 69 years ago. He quit smokeless tobacco use about 54 years ago.  His smokeless tobacco use included chew. He reports that he does not drink alcohol and does not use drugs.    Family History: family history includes Hypertension in his mother; Leukemia in his father.       Allergies:  Allergies   Allergen Reactions    Penicillins Hives       Medications:  Prior to Admission medications    Medication Sig Start Date End Date Taking? Authorizing Provider   acetaminophen (TYLENOL) 500 MG tablet Take 2 tablets by mouth Every Night.   Yes Eliecer Schultz MD   allopurinol (ZYLOPRIM) 100 MG tablet Take 1 tablet by mouth Daily.   Yes Eliecer Schultz MD   furosemide (LASIX) 20 MG tablet Take 1 tablet by mouth 2 (Two) Times a Day.   Yes Eliecer Schultz MD   losartan (COZAAR) 50 MG tablet Take 1 tablet by mouth Daily.   Yes Eliecer Schultz MD   meclizine (ANTIVERT) 12.5 MG tablet Take 1 tablet by mouth 3 (Three) Times a Day As Needed for Dizziness.   Yes Eliecer Schultz MD   melatonin 5 MG tablet tablet Take 2 tablets by mouth Every Night.   Yes Eliecer Schultz MD   metoprolol succinate XL (TOPROL-XL) 25 MG 24 hr tablet Take 1 tablet by mouth Daily.   Yes Eliecer Schultz MD   montelukast (SINGULAIR) 10 MG tablet Take 1 tablet by mouth Every Night.   Yes Eliecer Schultz MD   multivitamin with minerals tablet tablet Take 1 tablet by mouth Daily.   Yes Eliecer Schultz MD   pantoprazole (PROTONIX) 40 MG EC tablet Take 1 tablet by mouth Daily. 4/30/19  Yes Hang Reddy DO   sodium bicarbonate 650 MG tablet Take 1 tablet by mouth 3 (Three) Times a Day.   Yes Eliecer Schultz MD   spironolactone (ALDACTONE) 25 MG tablet Take 1 tablet by mouth Daily.   Yes Eliecer Schultz MD   tamsulosin (FLOMAX) 0.4 MG capsule 24 hr capsule Take 1 capsule by mouth Every Night.   Yes Eliecer Schultz MD   fluticasone (FLONASE)  "50 MCG/ACT nasal spray 1 spray into the nostril(s) as directed by provider Daily.    Provider, MD LETHA Gonzáles have utilized all available immediate resources to obtain, update, or review the patient's current medications (including all prescriptions, over-the-counter products, herbals, cannabis/cannabidiol products, and vitamin/mineral/dietary (nutritional) supplements).    Objective     Vital Signs: /85   Pulse 95   Temp 98.2 °F (36.8 °C) (Oral)   Resp 20   Ht 162.6 cm (64\")   Wt 75.3 kg (166 lb)   SpO2 99%   BMI 28.49 kg/m²   Physical Exam  Constitutional:       Appearance: He is well-developed. He is not ill-appearing, toxic-appearing or diaphoretic.   HENT:      Head: Normocephalic and atraumatic.      Right Ear: External ear normal.      Left Ear: External ear normal.      Nose: Nose normal.      Mouth/Throat:      Mouth: Mucous membranes are dry.   Eyes:      General:         Right eye: No discharge.         Left eye: No discharge.      Extraocular Movements: Extraocular movements intact.      Conjunctiva/sclera: Conjunctivae normal.   Neck:      Vascular: No JVD.   Cardiovascular:      Rate and Rhythm: Normal rate and regular rhythm.      Heart sounds: Normal heart sounds. No murmur heard.  Pulmonary:      Effort: Pulmonary effort is normal. No respiratory distress.      Breath sounds: Normal breath sounds. No wheezing or rales.   Chest:      Chest wall: No tenderness.   Abdominal:      General: Bowel sounds are normal. There is no distension.      Palpations: Abdomen is soft.      Tenderness: There is no abdominal tenderness. There is no guarding or rebound.   Musculoskeletal:         General: No tenderness or deformity. Normal range of motion.      Cervical back: Normal range of motion and neck supple. No rigidity.      Right lower leg: No edema.      Left lower leg: No edema.   Skin:     General: Skin is warm and dry.      Findings: No rash.   Neurological:      General: No focal deficit " present.      Mental Status: He is alert and oriented to person, place, and time. Mental status is at baseline.      Cranial Nerves: No cranial nerve deficit.      Sensory: No sensory deficit.      Motor: No abnormal muscle tone.      Deep Tendon Reflexes: Reflexes normal.   Psychiatric:         Mood and Affect: Mood normal.         Behavior: Behavior normal.       Results Reviewed:  Lab Results (last 24 hours)       Procedure Component Value Units Date/Time    High Sensitivity Troponin T 2Hr [[id number]]  (Abnormal) Collected: 06/29/24 1320    Specimen: Blood Updated: 06/29/24 1350     HS Troponin T 125 ng/L      Troponin T Delta 9 ng/L     Narrative:      High Sensitive Troponin T Reference Range:  <14.0 ng/L- Negative Female for AMI  <22.0 ng/L- Negative Male for AMI  >=14 - Abnormal Female indicating possible myocardial injury.  >=22 - Abnormal Male indicating possible myocardial injury.   Clinicians would have to utilize clinical acumen, EKG, Troponin, and serial changes to determine if it is an Acute Myocardial Infarction or myocardial injury due to an underlying chronic condition.         Blood Culture - Blood, Arm, Right [[id number]] Collected: 06/29/24 1105    Specimen: Blood from Arm, Right Updated: 06/29/24 1130    Blood Culture - Blood, Arm, Right [[id number]] Collected: 06/29/24 1027    Specimen: Blood from Arm, Right Updated: 06/29/24 1130    Respiratory Panel PCR w/COVID-19(SARS-CoV-2) JORDYN/MATILDE/BRYAN/PAD/COR/ASHER In-House, NP Swab in UTM/VTM, 2 HR TAT - Swab, Nasopharynx [[id number]]  (Normal) Collected: 06/29/24 1027    Specimen: Swab from Nasopharynx Updated: 06/29/24 1120     ADENOVIRUS, PCR Not Detected     Coronavirus 229E Not Detected     Coronavirus HKU1 Not Detected     Coronavirus NL63 Not Detected     Coronavirus OC43 Not Detected     COVID19 Not Detected     Human Metapneumovirus Not Detected     Human Rhinovirus/Enterovirus Not Detected     Influenza A PCR Not Detected     Influenza B PCR Not  "Detected     Parainfluenza Virus 1 Not Detected     Parainfluenza Virus 2 Not Detected     Parainfluenza Virus 3 Not Detected     Parainfluenza Virus 4 Not Detected     RSV, PCR Not Detected     Bordetella pertussis pcr Not Detected     Bordetella parapertussis PCR Not Detected     Chlamydophila pneumoniae PCR Not Detected     Mycoplasma pneumo by PCR Not Detected    Narrative:      In the setting of a positive respiratory panel with a viral infection PLUS a negative procalcitonin without other underlying concern for bacterial infection, consider observing off antibiotics or discontinuation of antibiotics and continue supportive care. If the respiratory panel is positive for atypical bacterial infection (Bordetella pertussis, Chlamydophila pneumoniae, or Mycoplasma pneumoniae), consider antibiotic de-escalation to target atypical bacterial infection.    T4, Free [[id number]]  (Normal) Collected: 06/29/24 1027    Specimen: Blood Updated: 06/29/24 1106     Free T4 1.53 ng/dL     TSH [[id number]]  (Normal) Collected: 06/29/24 1027    Specimen: Blood Updated: 06/29/24 1104     TSH 2.350 uIU/mL     Procalcitonin [[id number]]  (Normal) Collected: 06/29/24 1027    Specimen: Blood Updated: 06/29/24 1104     Procalcitonin 0.11 ng/mL     Narrative:      As a Marker for Sepsis (Non-Neonates):    1. <0.5 ng/mL represents a low risk of severe sepsis and/or septic shock.  2. >2 ng/mL represents a high risk of severe sepsis and/or septic shock.    As a Marker for Lower Respiratory Tract Infections that require antibiotic therapy:    PCT on Admission    Antibiotic Therapy       6-12 Hrs later    >0.5                Strongly Recommended  >0.25 - <0.5        Recommended   0.1 - 0.25          Discouraged              Remeasure/reassess PCT  <0.1                Strongly Discouraged     Remeasure/reassess PCT    As 28 day mortality risk marker: \"Change in Procalcitonin Result\" (>80% or <=80%) if Day 0 (or Day 1) and Day 4 values are " available. Refer to http://[email or web address]-pct-calculator.com    Change in PCT <=80%  A decrease of PCT levels below or equal to 80% defines a positive change in PCT test result representing a higher risk for 28-day all-cause mortality of patients diagnosed with severe sepsis for septic shock.    Change in PCT >80%  A decrease of PCT levels of more than 80% defines a negative change in PCT result representing a lower risk for 28-day all-cause mortality of patients diagnosed with severe sepsis or septic shock.       Comprehensive Metabolic Panel [[id number]]  (Abnormal) Collected: 06/29/24 1027    Specimen: Blood Updated: 06/29/24 1101     Glucose 94 mg/dL      BUN 61 mg/dL      Creatinine 2.77 mg/dL      Sodium 137 mmol/L      Potassium 4.9 mmol/L      Chloride 103 mmol/L      CO2 23.0 mmol/L      Calcium 10.4 mg/dL      Total Protein 7.2 g/dL      Albumin 3.8 g/dL      ALT (SGPT) 21 U/L      AST (SGOT) 22 U/L      Alkaline Phosphatase 119 U/L      Total Bilirubin 0.4 mg/dL      Globulin 3.4 gm/dL      A/G Ratio 1.1 g/dL      BUN/Creatinine Ratio 22.0     Anion Gap 11.0 mmol/L      eGFR 22.1 mL/min/1.73     Narrative:      GFR Normal >60  Chronic Kidney Disease <60  Kidney Failure <15    The GFR formula is only valid for adults with stable renal function between ages 18 and 70.    Magnesium [[id number]]  (Normal) Collected: 06/29/24 1027    Specimen: Blood Updated: 06/29/24 1101     Magnesium 1.8 mg/dL     High Sensitivity Troponin T [[id number]]  (Abnormal) Collected: 06/29/24 1027    Specimen: Blood Updated: 06/29/24 1100     HS Troponin T 116 ng/L     Narrative:      High Sensitive Troponin T Reference Range:  <14.0 ng/L- Negative Female for AMI  <22.0 ng/L- Negative Male for AMI  >=14 - Abnormal Female indicating possible myocardial injury.  >=22 - Abnormal Male indicating possible myocardial injury.   Clinicians would have to utilize clinical acumen, EKG, Troponin, and serial changes to determine if it is an Acute  Myocardial Infarction or myocardial injury due to an underlying chronic condition.         BNP [[id number]]  (Normal) Collected: 06/29/24 1027    Specimen: Blood Updated: 06/29/24 1059     proBNP 828.6 pg/mL     Narrative:      This assay is used as an aid in the diagnosis of individuals suspected of having heart failure. It can be used as an aid in the diagnosis of acute decompensated heart failure (ADHF) in patients presenting with signs and symptoms of ADHF to the emergency department (ED). In addition, NT-proBNP of <300 pg/mL indicates ADHF is not likely.    Age Range Result Interpretation  NT-proBNP Concentration (pg/mL:      <50             Positive            >450                   Gray                 300-450                    Negative             <300    50-75           Positive            >900                  Gray                300-900                  Negative            <300      >75             Positive            >1800                  Gray                300-1800                  Negative            <300    Lactic Acid, Plasma [[id number]]  (Normal) Collected: 06/29/24 1027    Specimen: Blood Updated: 06/29/24 1052     Lactate 1.7 mmol/L     Protime-INR [[id number]]  (Normal) Collected: 06/29/24 1027    Specimen: Blood Updated: 06/29/24 1048     Protime 14.1 Seconds      INR 1.04    aPTT [[id number]]  (Normal) Collected: 06/29/24 1027    Specimen: Blood Updated: 06/29/24 1048     PTT 31.4 seconds     CBC & Differential [[id number]]  (Abnormal) Collected: 06/29/24 1027    Specimen: Blood Updated: 06/29/24 1040    Narrative:      The following orders were created for panel order CBC & Differential.  Procedure                               Abnormality         Status                     ---------                               -----------         ------                     CBC Auto Differential[[id number]]        Abnormal            Final result                 Please view results for these tests on the  individual orders.    CBC Auto Differential [[id number]]  (Abnormal) Collected: 06/29/24 1027    Specimen: Blood Updated: 06/29/24 1040     WBC 7.39 10*3/mm3      RBC 4.54 10*6/mm3      Hemoglobin 13.8 g/dL      Hematocrit 44.0 %      MCV 96.9 fL      MCH 30.4 pg      MCHC 31.4 g/dL      RDW 17.0 %      RDW-SD 60.3 fl      MPV 11.4 fL      Platelets 181 10*3/mm3      Neutrophil % 74.4 %      Lymphocyte % 11.2 %      Monocyte % 10.8 %      Eosinophil % 1.6 %      Basophil % 0.5 %      Immature Grans % 1.5 %      Neutrophils, Absolute 5.49 10*3/mm3      Lymphocytes, Absolute 0.83 10*3/mm3      Monocytes, Absolute 0.80 10*3/mm3      Eosinophils, Absolute 0.12 10*3/mm3      Basophils, Absolute 0.04 10*3/mm3      Immature Grans, Absolute 0.11 10*3/mm3      nRBC 0.0 /100 WBC     Urinalysis With Culture If Indicated - Urine, Clean Catch [[id number]]  (Normal) Collected: 06/29/24 1028    Specimen: Urine, Clean Catch Updated: 06/29/24 1039     Color, UA Yellow     Appearance, UA Clear     pH, UA 6.5     Specific Gravity, UA 1.008     Glucose, UA Negative     Ketones, UA Negative     Bilirubin, UA Negative     Blood, UA Negative     Protein, UA Negative     Leuk Esterase, UA Negative     Nitrite, UA Negative     Urobilinogen, UA 0.2 E.U./dL    Narrative:      In absence of clinical symptoms, the presence of pyuria, bacteria, and/or nitrites on the urinalysis result does not correlate with infection.  Urine microscopic not indicated.    Blood Gas, Venous With Co-Ox [[id number]]  (Abnormal) Collected: 06/29/24 1026    Specimen: Venous Blood Updated: 06/29/24 1027     Site Nurse/Dr Draw     pH, Venous 7.340 pH Units      pCO2, Venous 44.8 mm Hg      pO2, Venous 17.8 mm Hg      Comment: 84 Value below reference range        HCO3, Venous 24.2 mmol/L      Base Excess, Venous -1.9 mmol/L      Comment: 84 Value below reference range        O2 Saturation, Venous 24.4 %      Comment: 84 Value below reference range        Hemoglobin, Blood  Gas 14.9 g/dL      Oxyhemoglobin Venous 24.0 %      Comment: 84 Value below reference range        Methemoglobin Venous 0.5 %      Carboxyhemoglobin Venous 1.1 %      Temperature 37.0     Sodium, Venous 138 mmol/L      Potassium, Venous 4.8 mmol/L      Barometric Pressure for Blood Gas 752 mmHg      Modality Room Air     FIO2 21 %      Ventilator Mode NA     Collected by RN     Comment: Meter: [id number]     :  Elizabeth Severs, CELSA             Imaging Results (Last 24 Hours)       Procedure Component Value Units Date/Time    CT Chest Without Contrast Diagnostic [[id number]] Collected: 06/29/24 1338     Updated: 06/29/24 1351    Narrative:      EXAMINATION:  CT CHEST WO CONTRAST DIAGNOSTIC-  6/29/2024 10:35 AM     HISTORY: Shortness of breath, increased oxygen, pain in chest and back;  J18.9-Pneumonia, unspecified organism; J84.10-Pulmonary fibrosis,  unspecified; I51.7-Cardiomegaly; N17.9-Acute kidney failure,  unspecified; N18.9-Chronic kidney disease, unspecified; R79.89-Other  specified abnormal findings of blood chemistry. Lung cancer.     COMPARISON : 5/6/2024.     DLP: 556.65 mGy.cm Automated dosage reduction technique was utilized to  reduce patient dosage.     TECHNIQUE: Spiral CT was performed of the chest without contrast.  Sagittal and coronal images were reconstructed.     MEDIASTINUM, HEART AND VASCULAR STRUCTURES: There is atheromatous  calcification of the thoracic aorta and coronary arteries. The ascending  thoracic aorta is dilated measuring 4.3 cm. The main pulmonary artery  segment is dilated measuring 3.6 cm. There are small mediastinal lymph  nodes many of which demonstrate calcification. There is a 1.4 cm short  axis diameter AP window lymph node that appears stable. There is mild  cardiomegaly.     LUNGS: There is pleural thickening on the right that is most prominent  in the right lung base laterally where the pleural thickness measures  about 2.3 cm. A spiculated right upper  lobe lesion laterally measures  4.7 x 2.1 cm, previously 4 x 2.2 cm. Consolidation in the right upper  lobe more superiorly and anteriorly may be related to posttreatment  changes of a previously described spiculated lesion. There are  reticulonodular changes in both lungs likely related to pulmonary  fibrosis. There is interlobular septal thickening. Emphysematous changes  are noted.     UPPER ABDOMEN: Please see the abdomen and pelvis CT report separately.     BONES: There are degenerative changes of the spine. No definite acute  bony abnormality is seen.          Impression:      1. A spiculated mass in the right upper lobe laterally is larger on the  current study.  2. There is consolidation around a previously described nodule in the  right lung apex likely related to posttreatment change.  3. Diffuse reticulonodular fibrotic changes throughout both lungs.  Emphysema.  4. Pleural thickening on the right that is more focal in the right lung  base laterally. Metastatic pleural disease is considered.  5. Ectasia of the ascending thoracic aorta measuring 4.3 cm. Main  pulmonary artery segment is dilated measuring 3.6 cm. Mild cardiomegaly.     6. Mediastinal lymph nodes appear stable. Multiple lymph nodes are  calcified.        The full report of this exam was immediately signed and available to the  emergency room. The patient is currently in the emergency room.           This report was signed and finalized on 6/29/2024 1:48 PM by Dr. Ramos Lagos MD.       CT Abdomen Pelvis Without Contrast [[id number]] Collected: 06/29/24 1208     Updated: 06/29/24 1230    Narrative:      EXAMINATION:  CT ABDOMEN PELVIS WO CONTRAST-  6/29/2024 10:35 AM     HISTORY: Shortness of breath, increased oxygen, pain in chest and back;  J18.9-Pneumonia, unspecified organism; J84.10-Pulmonary fibrosis,  unspecified; I51.7-Cardiomegaly; N17.9-Acute kidney failure,  unspecified; N18.9-Chronic kidney disease, unspecified;  R79.89-Other  specified abnormal findings of blood chemistry.     TECHNIQUE: Spiral CT was performed of the abdomen and pelvis without  contrast. Multiplanar images were reconstructed.     DLP: 556.65 mGy.cm Automated dosage reduction technique was utilized to  reduce patient dosage.     COMPARISON: 1/5/2023.     LUNG BASES: There is coronary artery calcification. There is mild  bilateral gynecomastia. There is focal pleural thickening in the right  lung base laterally. There is fibrosis in the lung bases as well as  emphysema.     LIVER AND SPLEEN: There is air within the biliary tree. No focal liver  lesion is seen. Prior cholecystectomy. The unenhanced spleen is  unremarkable.     PANCREAS: Normal unenhanced appearance of the pancreas.     KIDNEYS AND ADRENALS: The adrenal glands are normal. There are renal  cysts on the left. There is a 6 mm nonobstructive stone in the lower  pole left kidney. The ureters are nondilated. The urinary bladder is  unremarkable. The prostate measures about 4.7 cm transversely.     BOWEL: Gastric wall thickening likely related to under distention. A  proximal small bowel loop is mildly dilated measuring 4 cm. No other  small bowel distention is seen. The appendix is normal. There is under  distention of portions of the colon. There is moderate stool in the  colon.     OTHER: There is atheromatous disease of the aortoiliac vessels. There  are bilateral fat-containing inguinal hernias. The hernia on the left  extends all the way down to the scrotum. There are degenerative changes  of the spine. There is coarsening of the trabecula in the right femoral  neck and trochanteric region.          Impression:      1. There is a mildly dilated proximal small bowel loop measuring 4 cm.  No other small bowel distention is seen. This could be transient or due  to partial obstruction. Gastric wall thickening likely related to under  distention. There is under distention of portions of the colon  and  moderate stool in the colon.  2. 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  Fibrosis in the lung bases with emphysema.  3. Left renal cyst. A 6 mm nonobstructive renal stone lower pole on the  left.  4. Mildly enlarged prostate measuring 4.7 cm.  5. Air within the biliary tree likely related to prior sphincterotomy.  Prior cholecystectomy.  6. Coarsening of the trabecula in the right femoral neck and  trochanteric region could be related to early Paget's disease. There is  no cortical thickening. A lipo sclerosing myxofibrous tumor is felt to  be less likely as there is no sclerotic margin.        The full report of this exam was immediately signed and available to the  emergency room. The patient is currently in the emergency room.     This report was signed and finalized on 6/29/2024 12:27 PM by Dr. Ramos Lagos MD.       XR Chest 1 View [[id number]] Collected: 06/29/24 1033     Updated: 06/29/24 1038    Narrative:      EXAMINATION:  XR CHEST 1 VW-  6/29/2024 9:20 AM     HISTORY: Chest pain and shortness of breath.     COMPARISON: 1/5/2023.     TECHNIQUE: Single view AP image.     FINDINGS: There is pulmonary fibrosis. There is patchy infiltrate in the  right midlung zone. There is stable blunting of the right costophrenic  angle. There is cardiomegaly. There is atheromatous calcification of the  thoracic aorta. There is a port catheter on the right.          Impression:      1. Patchy infiltrate in the right midlung zone could represent an area  of pneumonia.  2. Stable appearing pulmonary fibrosis.  3. Stable blunting of the right costophrenic angle, likely a small  effusion.  4. Cardiomegaly.           This report was signed and finalized on 6/29/2024 10:35 AM by Dr. Ramos Lagos MD.             I have personally reviewed and interpreted the radiology studies and ECG obtained at time of admission.     Assessment / Plan   Assessment:   Active Hospital Problems    Diagnosis      **Chest pain     Acute renal failure superimposed on chronic kidney disease     Stage 3 chronic kidney disease     Adenocarcinoma, lung     Dyspnea     Essential hypertension      Treatment Plan  The patient will be admitted to my service here at [facility].   Observe in telemetry  Vitals every 4 hours  Cardiac diet  IVF    Chest pain with elevated troponin, has had elevated troponin in the past  Monitor symptoms and EKG  Troponin in AM  Consider Echo stress depending on evolution  Prednisone empirically for post radiation pneumonitis  Pain control    CAROLINA/CKD  IVF  I&O  BMP daily    Home medications reviewed    DVT prophylaxis Lovenox 40 mg SQ daily    Medical Decision Making  Number and Complexity of problems: 4 complex medical problems  Differential Diagnosis: See above    Conditions and Status        Condition is unchanged.     [facility] Data  External documents reviewed: [employer] EHR  Cardiac tracing (EKG, telemetry) interpretation: NSR  Radiology interpretation: see above  Labs reviewed: see above  Any tests that were considered but not ordered: none     Decision rules/scores evaluated (example [id number], Wells, etc): N/A     Discussed with: Patient     Care Planning  Shared decision making: Patient  Code status and discussions: Full code    Disposition  Social Determinants of Health that impact treatment or disposition: none  Estimated length of stay is to be determined.     I confirmed that the patient's advanced care plan is present, code status is documented, and a surrogate decision maker is listed in the patient's medical record.     The patient's surrogate decision maker is family, see records.     The patient was seen and examined by me on 6/29/2024 at 1453.    Electronically signed by Wilber Carbajal MD, 06/29/24, 14:53 CDT.

## 2024-06-29 NOTE — PLAN OF CARE
Goal Outcome Evaluation:Pt arrive to 4B from ER at approx 1615.  He is alert and oriented, no c/o pain, ambulates with standby assist, no s/s of distress, vitals are stable, room air, MD has been notified of pt arrival to floor  Plan of Care Reviewed With: patient        Progress: no change

## 2024-06-29 NOTE — ED NOTES
Nursing report ED to floor  Karoline Prater  84 y.o.  male    HPI:   Chief Complaint   Patient presents with    Shortness of Breath    Difficulty Urinating       Admitting doctor:   Wilber Carbajal MD    Consulting provider(s):  Consults       No orders found from 5/31/2024 to 6/30/2024.             Admitting diagnosis:   The primary encounter diagnosis was Pneumonia of right lung due to infectious organism, unspecified part of lung. Diagnoses of Pulmonary fibrosis, Cardiomegaly, Acute kidney injury superimposed on chronic kidney disease, and Elevated troponin were also pertinent to this visit.    Code status:   Current Code Status       Date Active Code Status Order ID Comments User Context       Prior            Allergies:   Penicillins    Intake and Output    Intake/Output Summary (Last 24 hours) at 6/29/2024 1529  Last data filed at 6/29/2024 1140  Gross per 24 hour   Intake 100 ml   Output --   Net 100 ml       Weight:       06/29/24  0925   Weight: 75.3 kg (166 lb)       Most recent vitals:   Vitals:    06/29/24 1131 06/29/24 1346 06/29/24 1401 06/29/24 1434   BP: 92/65 101/65 100/73 104/72   BP Location:       Patient Position:       Pulse: 88 78 78 80   Resp:       Temp:       TempSrc:       SpO2: 96% 96% 97% 97%   Weight:       Height:         Oxygen Therapy: .    Active LDAs/IV Access:   Lines, Drains & Airways       Active LDAs       Name Placement date Placement time Site Days    Peripheral IV 06/29/24 Left Antecubital 06/29/24  --  Antecubital  less than 1    Single Lumen Implantable Port 04/19/19 Right Chest 04/19/19  --  Chest  1898                    Labs (abnormal labs have a star):   Labs Reviewed   COMPREHENSIVE METABOLIC PANEL - Abnormal; Notable for the following components:       Result Value    BUN 61 (*)     Creatinine 2.77 (*)     Alkaline Phosphatase 119 (*)     eGFR 22.1 (*)     All other components within normal limits    Narrative:     GFR Normal >60  Chronic Kidney Disease  <60  Kidney Failure <15    The GFR formula is only valid for adults with stable renal function between ages 18 and 70.   TROPONIN - Abnormal; Notable for the following components:    HS Troponin T 116 (*)     All other components within normal limits    Narrative:     High Sensitive Troponin T Reference Range:  <14.0 ng/L- Negative Female for AMI  <22.0 ng/L- Negative Male for AMI  >=14 - Abnormal Female indicating possible myocardial injury.  >=22 - Abnormal Male indicating possible myocardial injury.   Clinicians would have to utilize clinical acumen, EKG, Troponin, and serial changes to determine if it is an Acute Myocardial Infarction or myocardial injury due to an underlying chronic condition.        CBC WITH AUTO DIFFERENTIAL - Abnormal; Notable for the following components:    MCHC 31.4 (*)     RDW 17.0 (*)     RDW-SD 60.3 (*)     Lymphocyte % 11.2 (*)     Immature Grans % 1.5 (*)     Immature Grans, Absolute 0.11 (*)     All other components within normal limits   BLOOD GAS, VENOUS W/CO-OXIMETRY - Abnormal; Notable for the following components:    pO2, Venous 17.8 (*)     Base Excess, Venous -1.9 (*)     O2 Saturation, Venous 24.4 (*)     Oxyhemoglobin Venous 24.0 (*)     All other components within normal limits   HIGH SENSITIVITIY TROPONIN T 2HR - Abnormal; Notable for the following components:    HS Troponin T 125 (*)     Troponin T Delta 9 (*)     All other components within normal limits    Narrative:     High Sensitive Troponin T Reference Range:  <14.0 ng/L- Negative Female for AMI  <22.0 ng/L- Negative Male for AMI  >=14 - Abnormal Female indicating possible myocardial injury.  >=22 - Abnormal Male indicating possible myocardial injury.   Clinicians would have to utilize clinical acumen, EKG, Troponin, and serial changes to determine if it is an Acute Myocardial Infarction or myocardial injury due to an underlying chronic condition.        RESPIRATORY PANEL PCR W/ COVID-19 (SARS-COV-2), NP SWAB IN  UTM/VTP, 2 HR TAT - Normal    Narrative:     In the setting of a positive respiratory panel with a viral infection PLUS a negative procalcitonin without other underlying concern for bacterial infection, consider observing off antibiotics or discontinuation of antibiotics and continue supportive care. If the respiratory panel is positive for atypical bacterial infection (Bordetella pertussis, Chlamydophila pneumoniae, or Mycoplasma pneumoniae), consider antibiotic de-escalation to target atypical bacterial infection.   PROTIME-INR - Normal   APTT - Normal   BNP (IN-HOUSE) - Normal    Narrative:     This assay is used as an aid in the diagnosis of individuals suspected of having heart failure. It can be used as an aid in the diagnosis of acute decompensated heart failure (ADHF) in patients presenting with signs and symptoms of ADHF to the emergency department (ED). In addition, NT-proBNP of <300 pg/mL indicates ADHF is not likely.    Age Range Result Interpretation  NT-proBNP Concentration (pg/mL:      <50             Positive            >450                   Gray                 300-450                    Negative             <300    50-75           Positive            >900                  Gray                300-900                  Negative            <300      >75             Positive            >1800                  Gray                300-1800                  Negative            <300   LACTIC ACID, PLASMA - Normal   PROCALCITONIN - Normal    Narrative:     As a Marker for Sepsis (Non-Neonates):    1. <0.5 ng/mL represents a low risk of severe sepsis and/or septic shock.  2. >2 ng/mL represents a high risk of severe sepsis and/or septic shock.    As a Marker for Lower Respiratory Tract Infections that require antibiotic therapy:    PCT on Admission    Antibiotic Therapy       6-12 Hrs later    >0.5                Strongly Recommended  >0.25 - <0.5        Recommended   0.1 - 0.25          Discouraged            "   Remeasure/reassess PCT  <0.1                Strongly Discouraged     Remeasure/reassess PCT    As 28 day mortality risk marker: \"Change in Procalcitonin Result\" (>80% or <=80%) if Day 0 (or Day 1) and Day 4 values are available. Refer to [email or web address]    Change in PCT <=80%  A decrease of PCT levels below or equal to 80% defines a positive change in PCT test result representing a higher risk for 28-day all-cause mortality of patients diagnosed with severe sepsis for septic shock.    Change in PCT >80%  A decrease of PCT levels of more than 80% defines a negative change in PCT result representing a lower risk for 28-day all-cause mortality of patients diagnosed with severe sepsis or septic shock.      T4, FREE - Normal   TSH - Normal   MAGNESIUM - Normal   URINALYSIS W/ CULTURE IF INDICATED - Normal    Narrative:     In absence of clinical symptoms, the presence of pyuria, bacteria, and/or nitrites on the urinalysis result does not correlate with infection.  Urine microscopic not indicated.   BLOOD CULTURE   BLOOD CULTURE   BLOOD GAS, VENOUS W/CO-OXIMETRY   CBC AND DIFFERENTIAL    Narrative:     The following orders were created for panel order CBC & Differential.  Procedure                               Abnormality         Status                     ---------                               -----------         ------                     CBC Auto Differential[[id number]]        Abnormal            Final result                 Please view results for these tests on the individual orders.       Meds given in ED:   Medications   sodium chloride 0.9 % flush 10 mL (has no administration in time range)   cefTRIAXone (ROCEPHIN) 1,000 mg in sodium chloride 0.9 % 100 mL MBP (0 mg Intravenous Stopped 6/29/24 1140)           NIH Stroke Scale:       Isolation/Infection(s):  No active isolations   No active infections     COVID Testing  Collected .  Resulted .    Nursing report ED to floor:  Mental status: " .ALERT AND ORIENTED    Ambulatory status: .STANDBY ASSIST  Precautions: .    ED nurse phone extentsion- ..

## 2024-06-29 NOTE — ED PROVIDER NOTES
"Subjective   History of Present Illness    Patient is an 84-year-old male presenting to ED via EMS with shortness of breath and difficulty urinating.  PMH significant for stomach/lung cancer, A-fib, hypertension, arthritis, CKD, GERD, CHF, CAD, fibrotic lung disease.  Patient states over the past 2 to 3 weeks he has had intermittent pains in his upper chest wall sometimes more on the right side which are sharp, stabbing in nature and come and go however they can be worse with movement and exertion and somewhat improved when he turns on his air conditioner uses his as needed home oxygen.  Patient states he does have a history of lung cancer and only uses oxygen as needed for which he did try this morning reporting some mild improvement.  Patient states that when the weather gets hot and humid like this he does not go outside as it causes increased breathing so he has been inside of his house the past few weeks.  Patient denies any associated nausea, diaphoresis but describes that intermittently the chest pains go into his back.  Patient denies any radiation into his arms or abdomen.  Patient denies any history of known problems with his aorta.  Patient has not used any medication such as nitroglycerin or aspirin for his pain or discomfort reporting the oxygen has helped the most.  Patient denies to this provider any difficulties with urination including dysuria, hematuria, or flank pain however reported to the RN that he has had some difficulty in urination.  Patient states he lives at home alone and felt it was time this morning when the pain became the worst it was to be evaluated for which he called EMS.  Patient states over the past couple weeks he has also had a mild nonproductive cough where he feels there is \"something collected in the bottom of my lungs but no one else can hear it.\"  Patient denies fevers, chills, diaphoresis.    Records reviewed show patient was last in the outpatient setting at the PCP " office on 6/11/24 for COPD, CKD, interstitial pulmonary fibrosis, adenocarcinoma of the lung, monoclonal gammopathy, history MI.    Patient last seen for oncology care in the infusion clinic on 5/15/2024 for encounter for central line care.    No previously documented ED visits.    Review of Systems   Constitutional: Negative.  Negative for chills, diaphoresis and fever.   HENT: Negative.     Eyes: Negative.    Respiratory:  Positive for cough, shortness of breath and wheezing.    Cardiovascular:  Positive for chest pain. Negative for palpitations and leg swelling.   Gastrointestinal: Negative.  Negative for abdominal pain, nausea and vomiting.   Genitourinary: Negative.    Musculoskeletal:  Positive for back pain. Negative for myalgias.   Skin: Negative.    Neurological: Negative.    Psychiatric/Behavioral: Negative.     All other systems reviewed and are negative.      Past Medical History:   Diagnosis Date    Arthritis     Atrial fibrillation with rapid ventricular response 05/31/2019    Blindness of left eye     BPH with obstruction/lower urinary tract symptoms     Cancer     stomach & lung    CHF (congestive heart failure)     CKD (chronic kidney disease) stage 3, GFR 30-59 ml/min     Coronary artery disease     Elevated cholesterol     Essential hypertension 10/02/2017    Fibrotic lung diseases     GERD (gastroesophageal reflux disease)     Hearing loss     History of transfusion     Hydronephrosis of left kidney     Hyperlipidemia     Hypertension     pt was taken off of all of his medications for BP (atenolol, lisinopril, lasix) because his BP kept bottoming out so his primary dr told him to discontinue them 1-2 months ago (Jan/Feb 2019). pt states he takes no medications currently.    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 Patel is just going to  keep an eye on it and not do surgery right now.    Mediastinal adenopathy 10/24/2018    [facility]    Monoclonal gammopathy of unknown significance (MGUS) 09/11/2018    Pancreatic mass     pt states he had this in 2013 but it went away on its own. Now recent CT shows it has come back so he is going to have an ultrasound on 3/13/19.    Shortness of breath        Allergies   Allergen Reactions    Penicillins Hives       Past Surgical History:   Procedure Laterality Date    ABDOMINAL SURGERY      BRONCHOSCOPY N/A 10/24/2018    Procedure: BRONCHOSCOPY WITH BIOPSY, EBUS;  Surgeon: Gareth Becerra MD;  Location: [facility] OR;  Service: Pulmonary    CHOLECYSTECTOMY      COLONOSCOPY N/A 1/3/2019    Procedure: COLONOSCOPY WITH ANESTHESIA;  Surgeon: Randy Somers DO;  Location: [facility] ENDOSCOPY;  Service: Gastroenterology    CYSTOSCOPY, RETROGRADE PYELOGRAM AND STENT INSERTION Left 3/8/2019    Procedure: CYSTOSCOPY RETROGRADE BILATERAL PYELOGRAM;  Surgeon: Jos Sylvester MD;  Location: [facility] OR;  Service: Urology    ENDOSCOPY N/A 12/11/2018    Procedure: ESOPHAGOGASTRODUODENOSCOPY WITH ANESTHESIA;  Surgeon: Randy Somers DO;  Location: [facility] ENDOSCOPY;  Service: Gastroenterology    ENDOSCOPY N/A 4/29/2019    Procedure: ESOPHAGOGASTRODUODENOSCOPY WITH ANESTHESIA;  Surgeon: Lilliam Jj MD;  Location: [facility] OR;  Service: Gastroenterology    ENDOSCOPY N/A 5/9/2019    Procedure: ESOPHAGOGASTRODUODENOSCOPY WITH ANESTHESIA;  Surgeon: Pilo Bansal MD;  Location: [facility] ENDOSCOPY;  Service: Gastroenterology    EYE SURGERY Left 1964    FOOT SURGERY Right 1966    joint    FRACTURE SURGERY         Family History   Problem Relation Age of Onset    Hypertension Mother     Leukemia Father        Social History     Socioeconomic History    Marital status: Single   Tobacco Use    Smoking status: Former     Current packs/day: 0.00     Types: Cigarettes     Start date: 10/29/1954     Quit date: 10/29/2003     Years  since quittin.6    Smokeless tobacco: Former     Types: Chew     Quit date:    Substance and Sexual Activity    Alcohol use: No    Drug use: No    Sexual activity: Defer           Objective   Physical Exam  Vitals and nursing note reviewed.   Constitutional:       General: He is not in acute distress.     Appearance: Normal appearance. He is well-developed, well-groomed and normal weight. He is not ill-appearing, toxic-appearing or diaphoretic.      Interventions: Nasal cannula in place.   HENT:      Head: Normocephalic.      Mouth/Throat:      Mouth: Mucous membranes are moist.      Pharynx: Oropharynx is clear.   Eyes:      Pupils: Pupils are equal, round, and reactive to light.   Cardiovascular:      Rate and Rhythm: Regular rhythm. Tachycardia present.      Comments: port to the right upper chest wall which is very well-appearing with no overlying skin changes, no surrounding swelling and no tenderness  Pulmonary:      Effort: Pulmonary effort is normal. No respiratory distress.      Breath sounds: No stridor. Decreased breath sounds present. No wheezing, rhonchi or rales.   Chest:      Chest wall: No tenderness.   Abdominal:      Palpations: Abdomen is soft.      Tenderness: There is no abdominal tenderness.   Musculoskeletal:         General: Normal range of motion.      Cervical back: Neck supple.      Right lower leg: No edema.      Left lower leg: No edema.   Skin:     General: Skin is warm and dry.      Coloration: Skin is not cyanotic.   Neurological:      Mental Status: He is alert and oriented to person, place, and time.      Gait: Gait normal.   Psychiatric:         Mood and Affect: Mood normal.         Behavior: Behavior normal. Behavior is cooperative.         Procedures           ED Course                                             Medical Decision Making  Problems Addressed:  Acute kidney injury superimposed on chronic kidney disease: complicated acute illness or injury  Cardiomegaly:  complicated acute illness or injury  Elevated troponin: complicated acute illness or injury  Pneumonia of right lung due to infectious organism, unspecified part of lung: complicated acute illness or injury  Pulmonary fibrosis: complicated acute illness or injury    Amount and/or Complexity of Data Reviewed  External Data Reviewed: labs, radiology and notes.  Labs: ordered. Decision-making details documented in ED Course.  Radiology: ordered. Decision-making details documented in ED Course.  ECG/medicine tests: ordered. Decision-making details documented in ED Course.  Discussion of management or test interpretation with external provider(s): Dr. Bc Padilla (attending)  Dr. Bagley (hospitalist)    Risk  Prescription drug management.      Patient is an 84-year-old male presenting to ED via EMS with shortness of breath and difficulty urinating.  PMH significant for stomach/lung cancer, A-fib, hypertension, arthritis, CKD, GERD, CHF, CAD, fibrotic lung disease.  Upon initial evaluation patient resting comfortably in bed in no acute distress.  Nontoxic-appearing, non-ill-appearing, not diaphoretic.  Patient tachycardic with otherwise stable vital signs.  Examination finds decreased lung sounds to both lung bases with no wheezes, rales, or rhonchi.  No peripheral edema, no calf tenderness.  Patient with a port to the right upper chest wall which is very well-appearing with no overlying skin changes, no surrounding swelling and no tenderness.  Discussed with patient need for workup to include CT imaging, chest x-ray, labs, and respiratory panel testing which he is amenable with no further questions, concerns, or needs at this time.    Differential diagnosis: COPD exacerbation, hypoxia, systemic infection, electrolyte disturbances, respiratory virus, COVID, hemodynamics, bronchitis, bronchiolitis, pneumonia, abnormal aorta, dissection, other    Lab work revealed  concern for [place] superimposed on CKD with creatinine  2.77, BUN 61 and GFR of 22.1 with baseline GFR [place] in the mid 40s.  CMP otherwise with no electro disturbances including normal magnesium, no hepatic dysfunction and normal anion gap.  CBC was unremarkable to include normal Eickholz of count, stable H&H and normal platelets.  PT/INR WNL.  Low concern for systemic or ischemic process with normal lactic acid as well as for the low concern for systemic bacterial process with normal procalcitonin.  Low concern for fluid overload with BNP within normal limits at 828.6 comparable to patient's baseline.  Thyroid hormones unremarkable.  Respiratory panel negative.  Initial high-sensitivity troponin 116 with delta +9 at 125 and no acute EKG changes.  Urinalysis unremarkable including no evidence of proteinuria or infection.  VBG performed on room air which revealed decreased pO2 of 17.8 for which patient was started on his 2 L nasal cannula oxygen.  pH within normal limits at 7.34 and no further acute abnormalities.  Chest x-ray showed: Patchy infiltrate in the right midlung zone could represent area of pneumonia, stable appearing pulmonary fibrosis, stable blunting of right costophrenic angle likely small effusion.  Cardiomegaly.  Due to patient's worsening renal function CTs were performed without contrast.  CT of the chest showed: Spiculated mass in the right upper lobe laterally is larger, consolidation around previously described nodule in the right lung apex likely related to posttreatment change, diffuse reticulonodular fibrotic changes throughout both lungs, emphysema, pleural thickening of the right more focal in the right lung base with metastatic pleural disease considered, ectasia of ascending thoracic aorta measuring 4.3 cm, main pulmonary artery segment dilated 3.6, mild cardiomegaly, mediastinal lymph nodes appear stable with multiple lymph nodes calcified.  CT imaging of the abdomen and pelvis without contrast showed: Mildly dilated proximal small bowel  loops measuring 4 cm with no small bowel distention seen, could be transient or due to partial obstruction.  Patient denied any nausea, vomiting.  Patient passing gas while in ED.  Atheromatous disease of aorta iliac vessels, left renal cyst, 6 mm nonobstructive renal stone in the lower pole the left kidney, mildly enlarged prostate.  Concern for early Paget's disease with coarsening of trabecula in the right femoral neck.  Throughout evaluation patient was pleasant and continued to remain hemodynamically stable.  Discussed with patient need for admission for further evaluation and treatment for which she is amenable and appreciative as he states he is concerned to go home due to the fact that he lives by himself and does not feel he is in good enough health to care for himself.  Case was discussed with Dr.Gerald Padilla, attending, who is in agreement with need for admission with no further recommendations. Case further discussed with Dr. Bagley, hospitalist, who will kindly accept patient for admission under the services of Dr. Velarde.       Final diagnoses:   Pneumonia of right lung due to infectious organism, unspecified part of lung   Pulmonary fibrosis   Cardiomegaly   Acute kidney injury superimposed on chronic kidney disease   Elevated troponin       ED Disposition  ED Disposition       ED Disposition   Decision to Admit    Condition   --    Comment   Level of Care: Med/Surg [1]   Diagnosis: Chest pain [700146]   Admitting Physician: CANDIDA VELARDE [[id number]]   Certification: I Certify That Inpatient Hospital Services Are Medically Necessary For Greater Than 2 Midnights                 No follow-up provider specified.       Medication List      No changes were made to your prescriptions during this visit.            Javon Choudhary PA-C  06/29/24 1344

## 2024-06-30 LAB
ANION GAP SERPL CALCULATED.3IONS-SCNC: 9 MMOL/L (ref 5–15)
BASOPHILS # BLD AUTO: 0.04 10*3/MM3 (ref 0–0.2)
BASOPHILS NFR BLD AUTO: 0.7 % (ref 0–1.5)
BUN SERPL-MCNC: 55 MG/DL (ref 8–23)
BUN/CREAT SERPL: 22.3 (ref 7–25)
CALCIUM SPEC-SCNC: 9.8 MG/DL (ref 8.6–10.5)
CHLORIDE SERPL-SCNC: 105 MMOL/L (ref 98–107)
CO2 SERPL-SCNC: 22 MMOL/L (ref 22–29)
CREAT SERPL-MCNC: 2.47 MG/DL (ref 0.76–1.27)
DEPRECATED RDW RBC AUTO: 60.1 FL (ref 37–54)
EGFRCR SERPLBLD CKD-EPI 2021: 25.4 ML/MIN/1.73
EOSINOPHIL # BLD AUTO: 0.17 10*3/MM3 (ref 0–0.4)
EOSINOPHIL NFR BLD AUTO: 3 % (ref 0.3–6.2)
ERYTHROCYTE [DISTWIDTH] IN BLOOD BY AUTOMATED COUNT: 17.1 % (ref 12.3–15.4)
GLUCOSE SERPL-MCNC: 83 MG/DL (ref 65–99)
HCT VFR BLD AUTO: 39.6 % (ref 37.5–51)
HGB BLD-MCNC: 12.7 G/DL (ref 13–17.7)
IMM GRANULOCYTES # BLD AUTO: 0.08 10*3/MM3 (ref 0–0.05)
IMM GRANULOCYTES NFR BLD AUTO: 1.4 % (ref 0–0.5)
LYMPHOCYTES # BLD AUTO: 0.74 10*3/MM3 (ref 0.7–3.1)
LYMPHOCYTES NFR BLD AUTO: 13.1 % (ref 19.6–45.3)
MCH RBC QN AUTO: 31 PG (ref 26.6–33)
MCHC RBC AUTO-ENTMCNC: 32.1 G/DL (ref 31.5–35.7)
MCV RBC AUTO: 96.6 FL (ref 79–97)
MONOCYTES # BLD AUTO: 0.68 10*3/MM3 (ref 0.1–0.9)
MONOCYTES NFR BLD AUTO: 12 % (ref 5–12)
NEUTROPHILS NFR BLD AUTO: 3.95 10*3/MM3 (ref 1.7–7)
NEUTROPHILS NFR BLD AUTO: 69.8 % (ref 42.7–76)
NRBC BLD AUTO-RTO: 0 /100 WBC (ref 0–0.2)
PLATELET # BLD AUTO: 166 10*3/MM3 (ref 140–450)
PMV BLD AUTO: 12 FL (ref 6–12)
POTASSIUM SERPL-SCNC: 4.9 MMOL/L (ref 3.5–5.2)
QT INTERVAL: 326 MS
QTC INTERVAL: 420 MS
RBC # BLD AUTO: 4.1 10*6/MM3 (ref 4.14–5.8)
SODIUM SERPL-SCNC: 136 MMOL/L (ref 136–145)
TROPONIN T SERPL HS-MCNC: 107 NG/L
WBC NRBC COR # BLD AUTO: 5.66 10*3/MM3 (ref 3.4–10.8)

## 2024-06-30 PROCEDURE — 63710000001 PREDNISONE PER 1 MG: Performed by: FAMILY MEDICINE

## 2024-06-30 PROCEDURE — 85025 COMPLETE CBC W/AUTO DIFF WBC: CPT | Performed by: FAMILY MEDICINE

## 2024-06-30 PROCEDURE — 84484 ASSAY OF TROPONIN QUANT: CPT | Performed by: FAMILY MEDICINE

## 2024-06-30 PROCEDURE — 80048 BASIC METABOLIC PNL TOTAL CA: CPT | Performed by: FAMILY MEDICINE

## 2024-06-30 PROCEDURE — 25010000002 ENOXAPARIN PER 10 MG: Performed by: FAMILY MEDICINE

## 2024-06-30 PROCEDURE — 25810000003 SODIUM CHLORIDE 0.9 % SOLUTION: Performed by: FAMILY MEDICINE

## 2024-06-30 RX ORDER — SODIUM CHLORIDE 9 MG/ML
50 INJECTION, SOLUTION INTRAVENOUS CONTINUOUS
Status: DISPENSED | OUTPATIENT
Start: 2024-06-30 | End: 2024-07-01

## 2024-06-30 RX ADMIN — ACETAMINOPHEN 1000 MG: 500 TABLET, FILM COATED ORAL at 21:17

## 2024-06-30 RX ADMIN — Medication 1 TABLET: at 08:36

## 2024-06-30 RX ADMIN — PANTOPRAZOLE SODIUM 40 MG: 40 TABLET, DELAYED RELEASE ORAL at 08:36

## 2024-06-30 RX ADMIN — FLUTICASONE PROPIONATE 1 SPRAY: 50 SPRAY, METERED NASAL at 08:37

## 2024-06-30 RX ADMIN — SODIUM CHLORIDE 50 ML/HR: 9 INJECTION, SOLUTION INTRAVENOUS at 08:37

## 2024-06-30 RX ADMIN — METOPROLOL SUCCINATE 25 MG: 25 TABLET, EXTENDED RELEASE ORAL at 08:36

## 2024-06-30 RX ADMIN — ATORVASTATIN CALCIUM 20 MG: 10 TABLET, FILM COATED ORAL at 21:18

## 2024-06-30 RX ADMIN — MONTELUKAST SODIUM 10 MG: 10 TABLET, FILM COATED ORAL at 21:18

## 2024-06-30 RX ADMIN — SODIUM BICARBONATE 650 MG: 650 TABLET ORAL at 08:36

## 2024-06-30 RX ADMIN — SODIUM BICARBONATE 650 MG: 650 TABLET ORAL at 15:45

## 2024-06-30 RX ADMIN — SODIUM CHLORIDE 50 ML/HR: 9 INJECTION, SOLUTION INTRAVENOUS at 12:31

## 2024-06-30 RX ADMIN — Medication 10 ML: at 08:37

## 2024-06-30 RX ADMIN — TAMSULOSIN HYDROCHLORIDE 0.4 MG: 0.4 CAPSULE ORAL at 21:18

## 2024-06-30 RX ADMIN — ASPIRIN 81 MG: 81 TABLET, COATED ORAL at 08:37

## 2024-06-30 RX ADMIN — SODIUM BICARBONATE 650 MG: 650 TABLET ORAL at 21:18

## 2024-06-30 RX ADMIN — Medication 10 ML: at 21:17

## 2024-06-30 RX ADMIN — PREDNISONE 40 MG: 20 TABLET ORAL at 08:36

## 2024-06-30 RX ADMIN — Medication 10 MG: at 21:18

## 2024-06-30 RX ADMIN — ALLOPURINOL 100 MG: 100 TABLET ORAL at 08:37

## 2024-06-30 RX ADMIN — ENOXAPARIN SODIUM 30 MG: 100 INJECTION SUBCUTANEOUS at 08:37

## 2024-06-30 NOTE — PLAN OF CARE
Goal Outcome Evaluation:      PT is A&O, VSS, and SR 71-86 on tele. PT appeared to rest well overnight. Safety maintained and call light within reach.

## 2024-06-30 NOTE — PROGRESS NOTES
[facility]  INPATIENT PROGRESS NOTE    Patient Name: Karoline Prater  Date of Admission: 6/29/2024  Today's Date: 06/30/24  Length of Stay: 1  Primary Care Physician: Irving Alexis MD    Subjective   Chief Complaint: Chest pain  HPI   Pain feels dull today.  No episodes of acute pain overnight.  He noticed some bothersome sensation over the left groin lymph node.  States feeling short of breath and having to wear oxygen with minimal activity.    Review of Systems   All pertinent negatives and positives are as above. All other systems have been reviewed and are negative unless otherwise stated.     Objective    Temp:  [97.6 °F (36.4 °C)-97.9 °F (36.6 °C)] 97.9 °F (36.6 °C)  Heart Rate:  [] 79  Resp:  [16-18] 16  BP: ()/(63-85) 104/63  Physical Exam  Constitutional:       Appearance: He is well-developed. He is not ill-appearing.   HENT:      Head: Normocephalic and atraumatic.      Right Ear: External ear normal.      Left Ear: External ear normal.      Nose: Nose normal.      Mouth/Throat:      Mouth: Mucous membranes are dry.   Eyes:      General:         Right eye: No discharge.         Left eye: No discharge.      Extraocular Movements: Extraocular movements intact.      Conjunctiva/sclera: Conjunctivae normal.      Pupils: Pupils are equal, round, and reactive to light.   Neck:      Vascular: No JVD.   Cardiovascular:      Rate and Rhythm: Normal rate and regular rhythm.      Heart sounds: Normal heart sounds. No murmur heard.  Pulmonary:      Effort: Pulmonary effort is normal. No respiratory distress.      Breath sounds: Normal breath sounds. No wheezing or rales.      Comments: Coarse breath sounds bilaterally  Chest:      Chest wall: No tenderness.   Abdominal:      General: Bowel sounds are normal. There is no distension.      Palpations: Abdomen is soft.      Tenderness: There is no abdominal tenderness. There is no guarding or  "rebound.   Musculoskeletal:         General: No tenderness or deformity. Normal range of motion.      Cervical back: Normal range of motion and neck supple. No rigidity.      Right lower leg: No edema.      Left lower leg: No edema.   Skin:     General: Skin is warm and dry.      Findings: No rash.   Neurological:      General: No focal deficit present.      Mental Status: He is alert and oriented to person, place, and time. Mental status is at baseline.      Cranial Nerves: No cranial nerve deficit.      Sensory: No sensory deficit.      Motor: No abnormal muscle tone.      Deep Tendon Reflexes: Reflexes normal.   Psychiatric:         Mood and Affect: Mood normal.         Behavior: Behavior normal.     Results Review:  I have reviewed the labs, radiology results, and diagnostic studies.    Laboratory Data:   Results from last 7 days   Lab Units 06/30/24  0417 06/29/24  1027   WBC 10*3/mm3 5.66 7.39   HEMOGLOBIN g/dL 12.7* 13.8   HEMATOCRIT % 39.6 44.0   PLATELETS 10*3/mm3 166 181        Results from last 7 days   Lab Units 06/30/24  0417 06/29/24  1027 06/29/24  1026   SODIUM mmol/L 136 137  --    SODIUM, VENOUS mmol/L  --   --  138   POTASSIUM mmol/L 4.9 4.9  --    POTASSIUM, VENOUS mmol/L  --   --  4.8   CHLORIDE mmol/L 105 103  --    CO2 mmol/L 22.0 23.0  --    BUN mg/dL 55* 61*  --    CREATININE mg/dL 2.47* 2.77*  --    CALCIUM mg/dL 9.8 10.4  --    BILIRUBIN mg/dL  --  0.4  --    ALK PHOS U/L  --  119*  --    ALT (SGPT) U/L  --  21  --    AST (SGOT) U/L  --  22  --    GLUCOSE mg/dL 83 94  --        Culture Data:   No results found for: \"BLOODCX\", \"URINECX\", \"WOUNDCX\", \"MRSACX\", \"RESPCX\", \"STOOLCX\"    Radiology Data:   Imaging Results (Last 24 Hours)       Procedure Component Value Units Date/Time    CT Chest Without Contrast Diagnostic [[id number]] Collected: 06/29/24 1338     Updated: 06/29/24 1351    Narrative:      EXAMINATION:  CT CHEST WO CONTRAST DIAGNOSTIC-  6/29/2024 10:35 AM     HISTORY: Shortness of " breath, increased oxygen, pain in chest and back;  J18.9-Pneumonia, unspecified organism; J84.10-Pulmonary fibrosis,  unspecified; I51.7-Cardiomegaly; N17.9-Acute kidney failure,  unspecified; N18.9-Chronic kidney disease, unspecified; R79.89-Other  specified abnormal findings of blood chemistry. Lung cancer.     COMPARISON : 5/6/2024.     DLP: 556.65 mGy.cm Automated dosage reduction technique was utilized to  reduce patient dosage.     TECHNIQUE: Spiral CT was performed of the chest without contrast.  Sagittal and coronal images were reconstructed.     MEDIASTINUM, HEART AND VASCULAR STRUCTURES: There is atheromatous  calcification of the thoracic aorta and coronary arteries. The ascending  thoracic aorta is dilated measuring 4.3 cm. The main pulmonary artery  segment is dilated measuring 3.6 cm. There are small mediastinal lymph  nodes many of which demonstrate calcification. There is a 1.4 cm short  axis diameter AP window lymph node that appears stable. There is mild  cardiomegaly.     LUNGS: There is pleural thickening on the right that is most prominent  in the right lung base laterally where the pleural thickness measures  about 2.3 cm. A spiculated right upper lobe lesion laterally measures  4.7 x 2.1 cm, previously 4 x 2.2 cm. Consolidation in the right upper  lobe more superiorly and anteriorly may be related to posttreatment  changes of a previously described spiculated lesion. There are  reticulonodular changes in both lungs likely related to pulmonary  fibrosis. There is interlobular septal thickening. Emphysematous changes  are noted.     UPPER ABDOMEN: Please see the abdomen and pelvis CT report separately.     BONES: There are degenerative changes of the spine. No definite acute  bony abnormality is seen.          Impression:      1. A spiculated mass in the right upper lobe laterally is larger on the  current study.  2. There is consolidation around a previously described nodule in the  right lung  apex likely related to posttreatment change.  3. Diffuse reticulonodular fibrotic changes throughout both lungs.  Emphysema.  4. Pleural thickening on the right that is more focal in the right lung  base laterally. Metastatic pleural disease is considered.  5. Ectasia of the ascending thoracic aorta measuring 4.3 cm. Main  pulmonary artery segment is dilated measuring 3.6 cm. Mild cardiomegaly.     6. Mediastinal lymph nodes appear stable. Multiple lymph nodes are  calcified.        The full report of this exam was immediately signed and available to the  emergency room. The patient is currently in the emergency room.           This report was signed and finalized on 6/29/2024 1:48 PM by Dr. Ramos Lagos MD.       CT Abdomen Pelvis Without Contrast [[id number]] Collected: 06/29/24 1208     Updated: 06/29/24 1230    Narrative:      EXAMINATION:  CT ABDOMEN PELVIS WO CONTRAST-  6/29/2024 10:35 AM     HISTORY: Shortness of breath, increased oxygen, pain in chest and back;  J18.9-Pneumonia, unspecified organism; J84.10-Pulmonary fibrosis,  unspecified; I51.7-Cardiomegaly; N17.9-Acute kidney failure,  unspecified; N18.9-Chronic kidney disease, unspecified; R79.89-Other  specified abnormal findings of blood chemistry.     TECHNIQUE: Spiral CT was performed of the abdomen and pelvis without  contrast. Multiplanar images were reconstructed.     DLP: 556.65 mGy.cm Automated dosage reduction technique was utilized to  reduce patient dosage.     COMPARISON: 1/5/2023.     LUNG BASES: There is coronary artery calcification. There is mild  bilateral gynecomastia. There is focal pleural thickening in the right  lung base laterally. There is fibrosis in the lung bases as well as  emphysema.     LIVER AND SPLEEN: There is air within the biliary tree. No focal liver  lesion is seen. Prior cholecystectomy. The unenhanced spleen is  unremarkable.     PANCREAS: Normal unenhanced appearance of the pancreas.     KIDNEYS AND ADRENALS:  The adrenal glands are normal. There are renal  cysts on the left. There is a 6 mm nonobstructive stone in the lower  pole left kidney. The ureters are nondilated. The urinary bladder is  unremarkable. The prostate measures about 4.7 cm transversely.     BOWEL: Gastric wall thickening likely related to under distention. A  proximal small bowel loop is mildly dilated measuring 4 cm. No other  small bowel distention is seen. The appendix is normal. There is under  distention of portions of the colon. There is moderate stool in the  colon.     OTHER: There is atheromatous disease of the aortoiliac vessels. There  are bilateral fat-containing inguinal hernias. The hernia on the left  extends all the way down to the scrotum. There are degenerative changes  of the spine. There is coarsening of the trabecula in the right femoral  neck and trochanteric region.          Impression:      1. There is a mildly dilated proximal small bowel loop measuring 4 cm.  No other small bowel distention is seen. This could be transient or due  to partial obstruction. Gastric wall thickening likely related to under  distention. There is under distention of portions of the colon and  moderate stool in the colon.  2. 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  Fibrosis in the lung bases with emphysema.  3. Left renal cyst. A 6 mm nonobstructive renal stone lower pole on the  left.  4. Mildly enlarged prostate measuring 4.7 cm.  5. Air within the biliary tree likely related to prior sphincterotomy.  Prior cholecystectomy.  6. Coarsening of the trabecula in the right femoral neck and  trochanteric region could be related to early Paget's disease. There is  no cortical thickening. A lipo sclerosing myxofibrous tumor is felt to  be less likely as there is no sclerotic margin.        The full report of this exam was immediately signed and available to the  emergency room. The patient is currently in the emergency room.      This report was signed and finalized on 6/29/2024 12:27 PM by Dr. Ramos Lagos MD.               I have reviewed the patient's current medications.     Echo2D 1/05/2023    Left ventricular systolic function is normal. Left ventricular ejection fraction appears to be 61 - 65%.    Left ventricular diastolic function is consistent with (grade I) impaired relaxation and age.    Estimated right ventricular systolic pressure from tricuspid regurgitation is markedly elevated (>55 mmHg).    Normal size and function right ventricle.    No significant (greater than mild) valvular pathology.       Assessment/Plan   Assessment  Active Hospital Problems    Diagnosis     **Chest pain     Acute renal failure superimposed on chronic kidney disease     Stage 3 chronic kidney disease     Adenocarcinoma, lung     Dyspnea     Essential hypertension        Treatment Plan  Vitals every 4 hours  Cardiac diet  IVF overnight     Chest pain with elevated troponin flat pattern, has had elevated troponin in the past  Monitor symptoms and EKG  Stress echocardiogram  V/Q scan for PE probability  Doppler US LE bilaterally r/o DVT  Prednisone empirically for post radiation pneumonitis  Pain control     CAROLINA/CKD  IVF  I&O  BMP daily    Stage 4 Lung cancer  Oncology outpatient follow up     Home medications reviewed     DVT prophylaxis Lovenox 40 mg SQ daily     Medical Decision Making  Number and Complexity of problems: 4 complex medical problems  Differential Diagnosis: See above     Conditions and Status        Condition is unchanged.     [facility] Data  External documents reviewed: [employer] EHR  Cardiac tracing (EKG, telemetry) interpretation: NSR  Radiology interpretation: see above  Labs reviewed: see above  Any tests that were considered but not ordered: none     Decision rules/scores evaluated (example [id number], Wells, etc): N/A     Discussed with: Patient     Care Planning  Shared decision making: Patient  Code status and discussions: Full  code    Disposition  Social Determinants of Health that impact treatment or disposition: none  I expect the patient to be discharged to home in 1-2 days.     Electronically signed by Wilber Carbajal MD, 06/30/24, 11:06 CDT.

## 2024-07-01 ENCOUNTER — APPOINTMENT (OUTPATIENT)
Dept: NUCLEAR MEDICINE | Facility: HOSPITAL | Age: 84
End: 2024-07-01
Payer: MEDICARE

## 2024-07-01 ENCOUNTER — TELEPHONE (OUTPATIENT)
Dept: HEMATOLOGY | Age: 84
End: 2024-07-01

## 2024-07-01 ENCOUNTER — APPOINTMENT (OUTPATIENT)
Dept: CARDIOLOGY | Facility: HOSPITAL | Age: 84
End: 2024-07-01
Payer: MEDICARE

## 2024-07-01 ENCOUNTER — APPOINTMENT (OUTPATIENT)
Dept: ULTRASOUND IMAGING | Facility: HOSPITAL | Age: 84
End: 2024-07-01
Payer: MEDICARE

## 2024-07-01 LAB
BH CV STRESS BP STAGE 1: NORMAL
BH CV STRESS BP STAGE 2: NORMAL
BH CV STRESS DOSE DOBUTAMINE STAGE 1: 10
BH CV STRESS DOSE DOBUTAMINE STAGE 2: 20
BH CV STRESS DURATION MIN STAGE 1: 3
BH CV STRESS DURATION MIN STAGE 2: 3
BH CV STRESS DURATION SEC STAGE 1: 0
BH CV STRESS DURATION SEC STAGE 2: 15
BH CV STRESS HR STAGE 1: 85
BH CV STRESS HR STAGE 2: 123
BH CV STRESS PROTOCOL 1: NORMAL
BH CV STRESS RECOVERY BP: NORMAL MMHG
BH CV STRESS RECOVERY HR: 94 BPM
BH CV STRESS STAGE 1: 1
BH CV STRESS STAGE 2: 2
MAXIMAL PREDICTED HEART RATE: 138 BPM
PERCENT MAX PREDICTED HR: 89.13 %
STRESS BASELINE BP: NORMAL MMHG
STRESS BASELINE HR: 82 BPM
STRESS PERCENT HR: 105 %
STRESS POST EXERCISE DUR MIN: 6 MIN
STRESS POST EXERCISE DUR SEC: 15 SEC
STRESS POST PEAK BP: NORMAL MMHG
STRESS POST PEAK HR: 123 BPM
STRESS TARGET HR: 117 BPM

## 2024-07-01 PROCEDURE — 25010000002 ENOXAPARIN PER 10 MG: Performed by: FAMILY MEDICINE

## 2024-07-01 PROCEDURE — 93017 CV STRESS TEST TRACING ONLY: CPT

## 2024-07-01 PROCEDURE — 25010000002 DOBUTAMINE PER 250 MG: Performed by: EMERGENCY MEDICINE

## 2024-07-01 PROCEDURE — 93970 EXTREMITY STUDY: CPT | Performed by: SURGERY

## 2024-07-01 PROCEDURE — 63710000001 PREDNISONE PER 1 MG: Performed by: FAMILY MEDICINE

## 2024-07-01 PROCEDURE — 93018 CV STRESS TEST I&R ONLY: CPT | Performed by: EMERGENCY MEDICINE

## 2024-07-01 PROCEDURE — 78582 LUNG VENTILAT&PERFUS IMAGING: CPT

## 2024-07-01 PROCEDURE — 25510000001 PERFLUTREN 6.52 MG/ML SUSPENSION: Performed by: EMERGENCY MEDICINE

## 2024-07-01 PROCEDURE — 93350 STRESS TTE ONLY: CPT | Performed by: EMERGENCY MEDICINE

## 2024-07-01 PROCEDURE — 93352 ADMIN ECG CONTRAST AGENT: CPT | Performed by: EMERGENCY MEDICINE

## 2024-07-01 PROCEDURE — 93350 STRESS TTE ONLY: CPT

## 2024-07-01 PROCEDURE — 97165 OT EVAL LOW COMPLEX 30 MIN: CPT

## 2024-07-01 PROCEDURE — 0 TECHNETIUM ALBUMIN AGGREGATED: Performed by: FAMILY MEDICINE

## 2024-07-01 PROCEDURE — 93970 EXTREMITY STUDY: CPT

## 2024-07-01 PROCEDURE — A9540 TC99M MAA: HCPCS | Performed by: FAMILY MEDICINE

## 2024-07-01 RX ORDER — BISACODYL 5 MG/1
5 TABLET, DELAYED RELEASE ORAL DAILY PRN
Status: DISCONTINUED | OUTPATIENT
Start: 2024-07-01 | End: 2024-07-04 | Stop reason: HOSPADM

## 2024-07-01 RX ORDER — METOPROLOL TARTRATE 1 MG/ML
5 INJECTION, SOLUTION INTRAVENOUS ONCE
Status: DISCONTINUED | OUTPATIENT
Start: 2024-07-01 | End: 2024-07-02

## 2024-07-01 RX ORDER — DOBUTAMINE HYDROCHLORIDE 100 MG/100ML
10-50 INJECTION INTRAVENOUS CONTINUOUS
Status: DISCONTINUED | OUTPATIENT
Start: 2024-07-01 | End: 2024-07-01

## 2024-07-01 RX ORDER — PANTOPRAZOLE SODIUM 20 MG/1
20 TABLET, DELAYED RELEASE ORAL DAILY
Status: DISCONTINUED | OUTPATIENT
Start: 2024-07-01 | End: 2024-07-04 | Stop reason: HOSPADM

## 2024-07-01 RX ORDER — BISACODYL 10 MG
10 SUPPOSITORY, RECTAL RECTAL DAILY PRN
Status: DISCONTINUED | OUTPATIENT
Start: 2024-07-01 | End: 2024-07-04 | Stop reason: HOSPADM

## 2024-07-01 RX ORDER — AMOXICILLIN 250 MG
2 CAPSULE ORAL DAILY
Status: DISCONTINUED | OUTPATIENT
Start: 2024-07-01 | End: 2024-07-04 | Stop reason: HOSPADM

## 2024-07-01 RX ORDER — POLYETHYLENE GLYCOL 3350 17 G/17G
17 POWDER, FOR SOLUTION ORAL DAILY PRN
Status: DISCONTINUED | OUTPATIENT
Start: 2024-07-01 | End: 2024-07-04 | Stop reason: HOSPADM

## 2024-07-01 RX ADMIN — DOBUTAMINE HYDROCHLORIDE 10 MCG/KG/MIN: 100 INJECTION INTRAVENOUS at 09:30

## 2024-07-01 RX ADMIN — PANTOPRAZOLE SODIUM 20 MG: 20 TABLET, DELAYED RELEASE ORAL at 12:47

## 2024-07-01 RX ADMIN — PREDNISONE 40 MG: 20 TABLET ORAL at 10:54

## 2024-07-01 RX ADMIN — Medication 10 ML: at 10:56

## 2024-07-01 RX ADMIN — Medication 1 TABLET: at 10:53

## 2024-07-01 RX ADMIN — METOPROLOL SUCCINATE 25 MG: 25 TABLET, EXTENDED RELEASE ORAL at 10:54

## 2024-07-01 RX ADMIN — MONTELUKAST SODIUM 10 MG: 10 TABLET, FILM COATED ORAL at 21:20

## 2024-07-01 RX ADMIN — TAMSULOSIN HYDROCHLORIDE 0.4 MG: 0.4 CAPSULE ORAL at 21:20

## 2024-07-01 RX ADMIN — Medication 10 ML: at 21:21

## 2024-07-01 RX ADMIN — SODIUM BICARBONATE 650 MG: 650 TABLET ORAL at 21:20

## 2024-07-01 RX ADMIN — ASPIRIN 81 MG: 81 TABLET, COATED ORAL at 11:06

## 2024-07-01 RX ADMIN — Medication 10 MG: at 21:20

## 2024-07-01 RX ADMIN — ACETAMINOPHEN 1000 MG: 500 TABLET, FILM COATED ORAL at 21:20

## 2024-07-01 RX ADMIN — KIT FOR THE PREPARATION OF TECHNETIUM TC 99M ALBUMIN AGGREGATED 1 DOSE: 2.5 INJECTION, POWDER, FOR SOLUTION INTRAVENOUS at 08:15

## 2024-07-01 RX ADMIN — ENOXAPARIN SODIUM 30 MG: 100 INJECTION SUBCUTANEOUS at 10:55

## 2024-07-01 RX ADMIN — SODIUM BICARBONATE 650 MG: 650 TABLET ORAL at 10:54

## 2024-07-01 RX ADMIN — PERFLUTREN 8.48 MG: 6.52 INJECTION, SUSPENSION INTRAVENOUS at 09:00

## 2024-07-01 RX ADMIN — ALLOPURINOL 100 MG: 100 TABLET ORAL at 10:54

## 2024-07-01 RX ADMIN — FLUTICASONE PROPIONATE 1 SPRAY: 50 SPRAY, METERED NASAL at 10:56

## 2024-07-01 RX ADMIN — ATORVASTATIN CALCIUM 20 MG: 10 TABLET, FILM COATED ORAL at 21:20

## 2024-07-01 RX ADMIN — SODIUM BICARBONATE 650 MG: 650 TABLET ORAL at 16:43

## 2024-07-01 NOTE — CASE MANAGEMENT/SOCIAL WORK
Discharge Planning Assessment  [facility]     Patient Name: Karoline Prater  MRN: [id number]  Today's Date: 7/1/2024    Admit Date: 6/29/2024        Discharge Needs Assessment       Row Name 07/01/24 1135       Living Environment    People in Home alone    Current Living Arrangements home    Potentially Unsafe Housing Conditions none    In the past 12 months has the electric, gas, oil, or water company threatened to shut off services in your home? No    Primary Care Provided by self    Provides Primary Care For no one    Family Caregiver if Needed sibling(s)    Family Caregiver Names Brother and neice    Quality of Family Relationships helpful;involved    Able to Return to Prior Arrangements yes       Resource/Environmental Concerns    Resource/Environmental Concerns none    Transportation Concerns none       Transportation Needs    In the past 12 months, has lack of transportation kept you from medical appointments or from getting medications? no    In the past 12 months, has lack of transportation kept you from meetings, work, or from getting things needed for daily living? No       Food Insecurity    Within the past 12 months, you worried that your food would run out before you got the money to buy more. Never true    Within the past 12 months, the food you bought just didn't last and you didn't have money to get more. Never true       Transition Planning    Patient/Family Anticipates Transition to home    Patient/Family Anticipated Services at Transition none    Transportation Anticipated car, drives self;family or friend will provide       Discharge Needs Assessment    Readmission Within the Last 30 Days no previous admission in last 30 days    Equipment Currently Used at Home walker, santana    Concerns to be Addressed no discharge needs identified    Anticipated Changes Related to Illness none    Equipment Needed After Discharge none    Provided Post Acute Provider List? N/A    Provided Post Acute Provider  Quality & Resource List? N/A    Discharge Coordination/Progress Lives at home alone. Brother lives nearby and assists pt when needed.Pt has PCP and Rx coverage. Pt is independent with adls and still drives. Pt states his brother picks up his prescriptions and groceries if it is too hot for him to get out. No D/C need identified at this time.                   Discharge Plan    No documentation.                 Continued Care and Services - Admitted Since 6/29/2024    No active coordination exists for this encounter.          Demographic Summary    No documentation.                  Functional Status    No documentation.                  Psychosocial    No documentation.                  Abuse/Neglect    No documentation.                  Legal    No documentation.                  Substance Abuse    No documentation.                  Patient Forms    No documentation.                     Pilo Griffith RN

## 2024-07-01 NOTE — PLAN OF CARE
Goal Outcome Evaluation:   PT is A&O, VSS, s [id number] on tele. PT appeared to rest well over night,sefety maintained, call light within reach.

## 2024-07-01 NOTE — THERAPY EVALUATION
Patient Name: Karoline Prater  : 1940    MRN: [id number]                              Today's Date: 2024       Admit Date: 2024    Visit Dx:     ICD-10-CM ICD-9-CM   1. Pneumonia of right lung due to infectious organism, unspecified part of lung  J18.9 483.8   2. Pulmonary fibrosis  J84.10 515   3. Cardiomegaly  I51.7 429.3   4. Acute kidney injury superimposed on chronic kidney disease  N17.9 584.9    N18.9 585.9   5. Elevated troponin  R79.89 790.6     Patient Active Problem List   Diagnosis    Dyspnea    Essential hypertension    NSVT (nonsustained ventricular tachycardia)    Malignant neoplasm of upper lobe of right lung    Stage 3 chronic kidney disease    Adenocarcinoma, lung    Pancytopenia due to antineoplastic chemotherapy    Pneumonia due to infectious organism    Function kidney decreased    Cellulitis    Acute respiratory failure with hypoxia    Fibrotic lung diseases    Macrocytic anemia    Thrombocytopenia    Sepsis    Right pneumothorax    Hyperkalemia    Acute renal failure superimposed on chronic kidney disease    GERD without esophagitis    A-fib    Former smoker    Chest pain     Past Medical History:   Diagnosis Date    Arthritis     Atrial fibrillation with rapid ventricular response 2019    Blindness of left eye     BPH with obstruction/lower urinary tract symptoms     Cancer     stomach & lung    CHF (congestive heart failure)     CKD (chronic kidney disease) stage 3, GFR 30-59 ml/min     Coronary artery disease     Elevated cholesterol     Essential hypertension 10/02/2017    Fibrotic lung diseases     GERD (gastroesophageal reflux disease)     Hearing loss     History of transfusion     Hydronephrosis of left kidney     Hyperlipidemia     Hypertension     pt was taken off of all of his medications for BP (atenolol, lisinopril, lasix) because his BP kept bottoming out so his primary dr told him to discontinue them 1-2 months ago (/2019). pt states he takes no  medications currently.    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 Patel is just going to keep an eye on it and not do surgery right now.    Mediastinal adenopathy 10/24/2018    Station [facility]    Monoclonal gammopathy of unknown significance (MGUS) 09/11/2018    Pancreatic mass     pt states he had this in 2013 but it went away on its own. Now recent CT shows it has come back so he is going to have an ultrasound on 3/13/19.    Shortness of breath      Past Surgical History:   Procedure Laterality Date    ABDOMINAL SURGERY      BRONCHOSCOPY N/A 10/24/2018    Procedure: BRONCHOSCOPY WITH BIOPSY, EBUS;  Surgeon: Gareth Becerra MD;  Location: [facility] OR;  Service: Pulmonary    CHOLECYSTECTOMY      COLONOSCOPY N/A 1/3/2019    Procedure: COLONOSCOPY WITH ANESTHESIA;  Surgeon: Randy Somers DO;  Location: [facility] ENDOSCOPY;  Service: Gastroenterology    CYSTOSCOPY, RETROGRADE PYELOGRAM AND STENT INSERTION Left 3/8/2019    Procedure: CYSTOSCOPY RETROGRADE BILATERAL PYELOGRAM;  Surgeon: Jos Sylvester MD;  Location: [facility] OR;  Service: Urology    ENDOSCOPY N/A 12/11/2018    Procedure: ESOPHAGOGASTRODUODENOSCOPY WITH ANESTHESIA;  Surgeon: Randy Somers DO;  Location: [facility] ENDOSCOPY;  Service: Gastroenterology    ENDOSCOPY N/A 4/29/2019    Procedure: ESOPHAGOGASTRODUODENOSCOPY WITH ANESTHESIA;  Surgeon: Lilliam Jj MD;  Location: [facility] OR;  Service: Gastroenterology    ENDOSCOPY N/A 5/9/2019    Procedure: ESOPHAGOGASTRODUODENOSCOPY WITH ANESTHESIA;  Surgeon: Pilo Bansal MD;  Location: [facility] ENDOSCOPY;  Service: Gastroenterology    EYE SURGERY Left 1964    FOOT SURGERY Right 1966    joint    FRACTURE SURGERY        General Information       Row Name 07/01/24 1305          OT Time and Intention    Document Type evaluation  cc: chest pain. Dx:  Chest pain. H/o Stage IV lung cancer, Afib, CKD, HTN.  -     Mode of Treatment occupational therapy  -       Row Name 07/01/24 1305          General Information    Patient Profile Reviewed yes  -     Prior Level of Function independent:;ADL's;all household mobility;community mobility;home management;cooking;driving;shopping;dependent:;cleaning  Utilizes SC outside on uneven surfaces  -     Existing Precautions/Restrictions fall;oxygen therapy device and L/min  -       Row Name 07/01/24 1305          Occupational Profile    Environmental Supports and Barriers (Occupational Profile) Step in shower with a built in seat and grab bars. Grab bars next to toilet. Other AD/DME: rollator, SC, home O2(as needed)  -       Row Name 07/01/24 1305          Living Environment    People in Home alone  -       Row Name 07/01/24 1305          Home Main Entrance    Number of Stairs, Main Entrance one  -       Row Name 07/01/24 1305          Stairs Within Home, Primary    Number of Stairs, Within Home, Primary none  -       Row Name 07/01/24 1305          Cognition    Orientation Status (Cognition) oriented x 4  -       Row Name 07/01/24 1305          Safety Issues, Functional Mobility    Impairments Affecting Function (Mobility) balance;endurance/activity tolerance;strength;shortness of breath  -               User Key  (r) = Recorded By, (t) = Taken By, (c) = Cosigned By      Initials Name Provider Type     Susanne Lima, OTR/L Occupational Therapist                     Mobility/ADL's       Row Name 07/01/24 1305          Bed Mobility    Bed Mobility supine-sit  -     Supine-Sit [place] (Bed Mobility) modified independence  -     Assistive Device (Bed Mobility) bed rails;head of bed elevated  -       Row Name 07/01/24 1305          Transfers    Transfers sit-stand transfer;stand-sit transfer  -       Row Name 07/01/24 1305          Sit-Stand Transfer    Sit-Stand [place] (Transfers) contact  guard  -LS       Row Name 07/01/24 1305          Stand-Sit Transfer    Stand-Sit [place] (Transfers) contact guard  -LS       Row Name 07/01/24 1305          Functional Mobility    Functional Mobility- Ind. Level contact guard assist  -LS     Patient was able to Ambulate yes  -       Row Name 07/01/24 1305          Activities of Daily Living    BADL Assessment/Intervention upper body dressing;lower body dressing;toileting  -LS       Row Name 07/01/24 1305          Upper Body Dressing Assessment/Training    [place] Level (Upper Body Dressing) upper body dressing skills;independent  -LS     Position (Upper Body Dressing) edge of bed sitting  -       Row Name 07/01/24 1305          Lower Body Dressing Assessment/Training    [place] Level (Lower Body Dressing) lower body dressing skills;contact guard assist  -LS     Position (Lower Body Dressing) unsupported sitting;unsupported standing  -       Row Name 07/01/24 1305          Toileting Assessment/Training    [place] Level (Toileting) toileting skills;contact guard assist  -LS     Position (Toileting) unsupported sitting;unsupported standing  -LS               User Key  (r) = Recorded By, (t) = Taken By, (c) = Cosigned By      Initials Name Provider Type    Susanne Hickman OTR/L Occupational Therapist                   Obj/Interventions       Row Name 07/01/24 1305          Sensory Assessment (Somatosensory)    Sensory Assessment Pt reports numbness/tingling throughout all fingertips on B hands; BUE sensation otherwise intact  -       Row Name 07/01/24 1305          Vision Assessment/Intervention    Visual Impairment/Limitations WFL;other (see comments);corrective lenses full-time  Blind L eye  -       Row Name 07/01/24 1305          Range of Motion Comprehensive    General Range of Motion bilateral upper extremity ROM WFL  -       Row Name 07/01/24 1305          Strength Comprehensive (MMT)    General Manual Muscle Testing (MMT)  Assessment upper extremity strength deficits identified  -LS     Comment, General Manual Muscle Testing (MMT) Assessment BUE strength grossly 4+/5 except B triceps 4/5  -LS       Row Name 07/01/24 1305          Balance    Balance Assessment sitting static balance;sitting dynamic balance;standing dynamic balance;standing static balance  -LS     Static Sitting Balance independent  -LS     Dynamic Sitting Balance independent  -LS     Position, Sitting Balance sitting edge of bed;unsupported  -LS     Static Standing Balance standby assist  -LS     Dynamic Standing Balance contact guard  -LS     Position/Device Used, Standing Balance unsupported  -LS               User Key  (r) = Recorded By, (t) = Taken By, (c) = Cosigned By      Initials Name Provider Type    LS Susanne Lima, OTR/L Occupational Therapist                   Goals/Plan       Row Name 07/01/24 1305          Transfer Goal 1 (OT)    Activity/Assistive Device (Transfer Goal 1, OT) toilet;shower chair  -LS     [place] Level/Cues Needed (Transfer Goal 1, OT) modified independence  -LS     Time Frame (Transfer Goal 1, OT) long term goal (LTG);10 days  -LS     Progress/Outcome (Transfer Goal 1, OT) new goal  -LS       Row Name 07/01/24 1305          Bathing Goal 1 (OT)    Activity/Device (Bathing Goal 1, OT) bathing skills, all  -LS     [place] Level/Cues Needed (Bathing Goal 1, OT) modified independence  -LS     Time Frame (Bathing Goal 1, OT) long term goal (LTG);10 days  -LS     Strategies/Barriers (Bathing Goal 1, OT) While maintaining O2 sats above 90%.  -LS     Progress/Outcomes (Bathing Goal 1, OT) new goal  -LS       Row Name 07/01/24 1305          Dressing Goal 1 (OT)    Activity/Device (Dressing Goal 1, OT) dressing skills, all  -LS     [place]/Cues Needed (Dressing Goal 1, OT) modified independence  -LS     Time Frame (Dressing Goal 1, OT) long term goal (LTG);10 days  -LS     Progress/Outcome (Dressing Goal 1, OT) new goal  -LS        Row Name 07/01/24 1305          Therapy Assessment/Plan (OT)    Planned Therapy Interventions (OT) activity tolerance training;functional balance retraining;occupation/activity based interventions;ROM/therapeutic exercise;strengthening exercise;transfer/mobility retraining;patient/caregiver education/training;adaptive equipment training;BADL retraining  -               User Key  (r) = Recorded By, (t) = Taken By, (c) = Cosigned By      Initials Name Provider Type     Susanne Lima, OTR/L Occupational Therapist                   Clinical Impression       [facility] Name 07/01/24 1305          Pain Assessment    Pretreatment Pain Rating 0/10 - no pain  -LS     Posttreatment Pain Rating 0/10 - no pain  -LS       Row Name 07/01/24 1305          Plan of Care Review    Plan of Care Reviewed With patient  -LS     Progress no change  -     Outcome Evaluation OT eval completed. Pt in fowlers upon therapist arrival; A&Ox4; 2L BNC. Pt reports Mod I-I with all BADLs including fxl ambulation at baseline. Today, Pt performed supine>sit utilizing bedrail with HOB elevated with Mod I. Pt donned B socks while seated EOB with SBA. Pt performed sit<>stand with CGA. Pt ambulated from bed<>BR utilizing no AD with CGA. Pt additionally demos some BUE weakness. Attempted to perform fxl activity on RA, however, Pt desats into upper 80s. Skilled OT intervention indicated in order to address deficits in fxl activity tolerance and strength. Recommend home with assist at discharge.  -       Row Name 07/01/24 1305          Therapy Assessment/Plan (OT)    Rehab Potential (OT) good, to achieve stated therapy goals  -     Criteria for Skilled Therapeutic Interventions Met (OT) yes;skilled treatment is necessary  -     Therapy Frequency (OT) 5 times/wk  -       Row Name 07/01/24 1304          Therapy Plan Review/Discharge Plan (OT)    Anticipated Discharge Disposition (OT) home  -       Row Name 07/01/24 1305          Positioning and  Restraints    Pre-Treatment Position in bed  -LS     Post Treatment Position bed  -LS     In Bed sitting EOB;call light within reach;encouraged to call for assist;side rails up x2  -LS               User Key  (r) = Recorded By, (t) = Taken By, (c) = Cosigned By      Initials Name Provider Type    Susanne Hickman OTR/L Occupational Therapist                   Outcome Measures       Row Name 07/01/24 1305          How much help from another is currently needed...    Putting on and taking off regular lower body clothing? 3  -LS     Bathing (including washing, rinsing, and drying) 3  -LS     Toileting (which includes using toilet bed pan or urinal) 3  -LS     Putting on and taking off regular upper body clothing 4  -LS     Taking care of personal grooming (such as brushing teeth) 4  -LS     Eating meals 4  -LS     AM-PAC 6 Clicks Score (OT) 21  -       Row Name 07/01/24 1305          Functional Assessment    Outcome Measure Options AM-PAC 6 Clicks Daily Activity (OT)  -LS               User Key  (r) = Recorded By, (t) = Taken By, (c) = Cosigned By      Initials Name Provider Type    Susanne Hickman OTR/L Occupational Therapist                    Occupational Therapy Education       Title: PT OT SLP Therapies (In Progress)       Topic: Occupational Therapy (In Progress)       Point: ADL training (Done)       Description:   Instruct learner(s) on proper safety adaptation and remediation techniques during self care or transfers.   Instruct in proper use of assistive devices.                  Learning Progress Summary             Patient Acceptance, E, VU by ONDINA at 7/1/2024 1332                         Point: Home exercise program (Not Started)       Description:   Instruct learner(s) on appropriate technique for monitoring, assisting and/or progressing therapeutic exercises/activities.                  Learner Progress:  Not documented in this visit.              Point: Precautions (Done)       Description:   Instruct  learner(s) on prescribed precautions during self-care and functional transfers.                  Learning Progress Summary             Patient Acceptance, E, VU by  at 7/1/2024 1332                         Point: Body mechanics (Done)       Description:   Instruct learner(s) on proper positioning and spine alignment during self-care, functional mobility activities and/or exercises.                  Learning Progress Summary             Patient Acceptance, E, VU by  at 7/1/2024 1332                                         User Key       Initials Effective Dates Name Provider Type Discipline     06/20/22 -  Susanne Lima, OTR/L Occupational Therapist OT                  OT Recommendation and Plan  Planned Therapy Interventions (OT): activity tolerance training, functional balance retraining, occupation/activity based interventions, ROM/therapeutic exercise, strengthening exercise, transfer/mobility retraining, patient/caregiver education/training, adaptive equipment training, BADL retraining  Therapy Frequency (OT): 5 times/wk  Plan of Care Review  Plan of Care Reviewed With: patient  Progress: no change  Outcome Evaluation: OT eval completed. Pt in fowlers upon therapist arrival; A&Ox4; 2L BNC. Pt reports Mod I-I with all BADLs including fxl ambulation at baseline. Today, Pt performed supine>sit utilizing bedrail with HOB elevated with Mod I. Pt donned B socks while seated EOB with SBA. Pt performed sit<>stand with CGA. Pt ambulated from bed<>BR utilizing no AD with CGA. Pt additionally demos some BUE weakness. Attempted to perform fxl activity on RA, however, Pt desats into upper 80s. Skilled OT intervention indicated in order to address deficits in fxl activity tolerance and strength. Recommend home with assist at discharge.     Time Calculation:         Time Calculation- OT       Row Name 07/01/24 1305             Time Calculation- OT    OT Start Time 1305  +10 minutes chart review  -      OT Stop Time  1325  -      OT Time Calculation (min) 20 min  -      OT Non-Billable Time (min) 30 min  -      OT Received On 07/01/24  -      OT Goal Re-Cert Due Date 07/11/24  -                User Key  (r) = Recorded By, (t) = Taken By, (c) = Cosigned By      Initials Name Provider Type    LS Susanne Lima, OTR/L Occupational Therapist                  Therapy Charges for Today       Code Description Service Date Service Provider Modifiers Qty    62017833917 HC OT EVAL LOW COMPLEXITY 2 7/1/2024 Susanne Lima, OTR/L GO 1                 Susanne Lima OTR/L  7/1/2024

## 2024-07-01 NOTE — PLAN OF CARE
Goal Outcome Evaluation:  Plan of Care Reviewed With: patient        Progress: no change  Outcome Evaluation: OT eval completed. Pt in fowlers upon therapist arrival; A&Ox4; 2L BNC. Pt reports Mod I-I with all BADLs including fxl ambulation at baseline. Today, Pt performed supine>sit utilizing bedrail with HOB elevated with Mod I. Pt donned B socks while seated EOB with SBA. Pt performed sit<>stand with CGA. Pt ambulated from bed<>BR utilizing no AD with CGA. Pt additionally demos some BUE weakness. Attempted to perform fxl activity on RA, however, Pt desats into upper 80s. Skilled OT intervention indicated in order to address deficits in fxl activity tolerance and strength. Recommend home with assist at discharge.      Anticipated Discharge Disposition (OT): home

## 2024-07-01 NOTE — PLAN OF CARE
Goal Outcome Evaluation:      Vss, s  on tele, no c/o pain, no new issues noted

## 2024-07-01 NOTE — TELEPHONE ENCOUNTER
Called and left detailed vm about appt. date and time on 07/03/24. I left message to come at appt. time & not any earlier & we would gladly take care of everything at that time of their follow up appt. time. Left number to call in case they can't make this appt. and needed to r/s.

## 2024-07-01 NOTE — PROGRESS NOTES
[facility] Medicine Services  INPATIENT PROGRESS NOTE    Patient Name: Karoline Prater  Date of Admission: 6/29/2024  Today's Date: 07/01/24  Length of Stay: 2  Primary Care Physician: Irving Alexis MD    Subjective   Chief Complaint: Acute on chronic renal failure/chest pain  HPI   Blood pressure slightly high.  Afebrile.  Troponin trending down.  Slight improvement in creatinine.  Patient sitting up having breakfast.  Patient denies any chest pain    Review of Systems   Constitutional:  Positive for activity change, appetite change and fatigue. Negative for chills and fever.   HENT:  Negative for hearing loss, nosebleeds, tinnitus and trouble swallowing.    Eyes:  Negative for visual disturbance.   Respiratory:  Positive for shortness of breath. Negative for cough, chest tightness and wheezing.    Cardiovascular:  Negative for chest pain, palpitations and leg swelling.   Gastrointestinal:  Negative for abdominal distention, abdominal pain, blood in stool, constipation, diarrhea, nausea and vomiting.   Endocrine: Negative for cold intolerance, heat intolerance, polydipsia, polyphagia and polyuria.   Genitourinary:  Negative for decreased urine volume, difficulty urinating, dysuria, flank pain, frequency and hematuria.   Musculoskeletal:  Positive for arthralgias, gait problem and myalgias. Negative for joint swelling.   Skin:  Negative for rash.   Allergic/Immunologic: Negative for immunocompromised state.   Neurological:  Positive for weakness. Negative for dizziness, syncope, light-headedness and headaches.   Hematological:  Negative for adenopathy. Does not bruise/bleed easily.   Psychiatric/Behavioral:  Negative for confusion and sleep disturbance. The patient is not nervous/anxious.       All pertinent negatives and positives are as above. All other systems have been reviewed and are negative unless otherwise stated.     Objective    Temp:  [97.4 °F (36.3  °C)-98.1 °F (36.7 °C)] 97.5 °F (36.4 °C)  Heart Rate:  [79-97] 85  Resp:  [16-17] 16  BP: (122-152)/(71-80) 142/80  Physical Exam  Vitals and nursing note reviewed.   Constitutional:       Comments: Advanced age.  Chronically ill.   HENT:      Head: Normocephalic and atraumatic.   Eyes:      Conjunctiva/sclera: Conjunctivae normal.      Pupils: Pupils are equal, round, and reactive to light.      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   Cardiovascular:      Rate and Rhythm: Normal rate and regular rhythm.      Heart sounds: Normal heart sounds.   Pulmonary:      Effort: Pulmonary effort is normal. No respiratory distress.      Breath sounds: Normal breath sounds.   Abdominal:      General: Bowel sounds are normal. There is no distension.      Palpations: Abdomen is soft.      Tenderness: There is no abdominal tenderness.   Musculoskeletal:         General: No swelling.      Cervical back: Neck supple.   Skin:     General: Skin is warm and dry.      Capillary Refill: Capillary refill takes 2 to 3 seconds.      Findings: No rash.   Neurological:      General: No focal deficit present.      Mental Status: He is alert and oriented to person, place, and time.      Motor: Weakness present.      Coordination: Coordination abnormal.      Gait: Gait abnormal.   Psychiatric:         Mood and Affect: Mood normal.         Behavior: Behavior normal.         Thought Content: Thought content normal.             Results Review:  I have reviewed the labs, radiology results, and diagnostic studies.    Laboratory Data:   Results from last 7 days   Lab Units 06/30/24  0417 06/29/24  1027   WBC 10*3/mm3 5.66 7.39   HEMOGLOBIN g/dL 12.7* 13.8   HEMATOCRIT % 39.6 44.0   PLATELETS 10*3/mm3 166 181        Results from last 7 days   Lab Units 06/30/24  0417 06/29/24  1027 06/29/24  1026   SODIUM mmol/L 136 137  --    SODIUM, VENOUS mmol/L  --   --  138   POTASSIUM mmol/L 4.9 4.9  --    POTASSIUM, VENOUS mmol/L  --   --  4.8   CHLORIDE mmol/L 105  103  --    CO2 mmol/L 22.0 23.0  --    BUN mg/dL 55* 61*  --    CREATININE mg/dL 2.47* 2.77*  --    CALCIUM mg/dL 9.8 10.4  --    BILIRUBIN mg/dL  --  0.4  --    ALK PHOS U/L  --  119*  --    ALT (SGPT) U/L  --  21  --    AST (SGOT) U/L  --  22  --    GLUCOSE mg/dL 83 94  --        Culture Data:   Blood Culture   Date Value Ref Range Status   06/29/2024 No growth at 24 hours  Preliminary   06/29/2024 No growth at 24 hours  Preliminary       Radiology Data:   Imaging Results (Last 24 Hours)       Procedure Component Value Units Date/Time    NM Lung Ventilation Perfusion [[id number]] Collected: 07/01/24 0823     Updated: 07/01/24 0828    Narrative:      EXAMINATION: NM LUNG VENTILATION PERFUSION-     7/1/2024 7:16 AM     HISTORY: chest pain and hypoxia; J18.9-Pneumonia, unspecified organism;  J84.10-Pulmonary fibrosis, unspecified; I51.7-Cardiomegaly; N17.9-Acute  kidney failure, unspecified; N18.9-Chronic kidney disease, unspecified;  R79.89-Other specified abnormal findings of blood chemistry.     After the intravenous injection of 5.4 mCi of technetium 99m  macroaggregated albumin, the images of the lungs were obtained in  anterior, posterior, both oblique and lateral projections with help for  scintillation camera.     The ventilation scans were not obtained.     The comparison is made with the previous study dated 7/23/2020.     Ill-defined, nonsegmental, linear, perfusion defects in both lungs are  similar to the previous study. There are no segmental or subsegmental  perfusion defects noted.       Impression:      1. Low probability of acute pulmonary embolism.        This report was signed and finalized on 7/1/2024 8:25 AM by Dr. Kelly Coleman MD.       US Venous Doppler Lower Extremity Bilateral (duplex) [[id number]] Resulted: 07/01/24 0730     Updated: 07/01/24 0746            I have reviewed the patient's current medications.     Assessment/Plan   Assessment  Active Hospital Problems    Diagnosis      **Chest pain     Former smoker     GERD without esophagitis     Acute renal failure superimposed on chronic kidney disease     Fibrotic lung diseases     Stage 3 chronic kidney disease     Adenocarcinoma, lung     Malignant neoplasm of upper lobe of right lung     Dyspnea     Essential hypertension        Treatment Plan  Chest pain/hypertension/hyperlipidemia/CAD.  Aspirin.  Lipitor.  Toprol .  Patient denies any chest pain today.  Stress echo pending.    Stage IV lung cancer/adenocarcinoma.  Radiation treatment.  CT of the chest-spiculated mass in the right upper lobe laterally is larger on the  current study, consolidation around a previously described nodule in the  right lung apex likely related to posttreatment change, Diffuse reticulonodular fibrotic changes throughout both lungs, Emphysema, Pleural thickening on the right that is more focal in the right lung base laterall,. Metastatic pleural disease is considered, Ectasia of the ascending thoracic aorta measuring 4.3 cm- Main pulmonary artery segment is dilated measuring 3.6 cm- Mild cardiomegaly, Mediastinal lymph nodes appear stable- Multiple lymph nodes are calcified.  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 ...     Constipation . Constipation protocol.    Shortness of breath/COPD/emphysema/fibrosis of the lung base.  Patient is on chronic 2 L of oxygen at home.  Singulair.  Cut back prednisone.  VQ scan- Low probability of acute pulmonary embolism. .   Doppler  ultrasound lower gyatissko-pprulvjatxc-Hd thrombus vis in BLE.   Patient is currently on 2 L of oxygen.    Nonobstructive kidney stone.    Acute on chronic stage III renal failure.  Previous creatinine 1/7/2023 2.05.  Slow IV hydration.  Creatinine 2.47.    Gout.  Allopurinol.    Allergic rhinitis.  Flonase.    Reflux . Protonix.  Zofran as needed.    Pain . Ultram as needed.    Prostate hypertrophy.  Flomax.    Insomnia/anxiety.  Melatonin nightly.  Antivert as needed.    Lovenox prophylaxis, renal dosing.    Advanced age.  84 years old.    Nutrition.  Multivitamins.  Cardiac diet.    Blood cultures pending-no growth in 24 hours.    Deconditioning.  PT and OT consult.    Patient lives at home independently.  Patient gets around with a cane/walker as needed.    Medical Decision Making  Number and Complexity of problems: Chest pain.  Emphysema/COPD/fibrosis lung base/advanced age  Differential Diagnosis: None    Conditions and Status        Condition is unchanged.     MDM Data  External documents reviewed: Previous note.  Cardiac tracing (EKG, telemetry) interpretation: Sinus .  Radiology interpretation: CT scan  Labs reviewed: Laboratory  Any tests that were considered but not ordered: Lab in a.m.     Decision rules/scores evaluated (example [id number], Wells, etc): None     Discussed with: Patient     Care Planning  Shared decision making: Patient  Code status and discussions: Full code    Disposition  Social Determinants of Health that impact treatment or disposition: From home  1 to 2 days.    Electronically signed by Zachary Lloyd MD, 07/01/24, 10:23 CDT.

## 2024-07-02 ENCOUNTER — TELEPHONE (OUTPATIENT)
Dept: HEMATOLOGY | Age: 84
End: 2024-07-02

## 2024-07-02 LAB
ALBUMIN SERPL-MCNC: 3.6 G/DL (ref 3.5–5.2)
ALBUMIN/GLOB SERPL: 1.1 G/DL
ALP SERPL-CCNC: 113 U/L (ref 39–117)
ALT SERPL W P-5'-P-CCNC: 21 U/L (ref 1–41)
ANION GAP SERPL CALCULATED.3IONS-SCNC: 10 MMOL/L (ref 5–15)
AST SERPL-CCNC: 24 U/L (ref 1–40)
BASOPHILS # BLD AUTO: 0.01 10*3/MM3 (ref 0–0.2)
BASOPHILS NFR BLD AUTO: 0.1 % (ref 0–1.5)
BILIRUB SERPL-MCNC: 0.3 MG/DL (ref 0–1.2)
BUN SERPL-MCNC: 45 MG/DL (ref 8–23)
BUN/CREAT SERPL: 22 (ref 7–25)
CALCIUM SPEC-SCNC: 10.2 MG/DL (ref 8.6–10.5)
CHLORIDE SERPL-SCNC: 106 MMOL/L (ref 98–107)
CHOLEST SERPL-MCNC: 115 MG/DL (ref 0–200)
CO2 SERPL-SCNC: 21 MMOL/L (ref 22–29)
CREAT SERPL-MCNC: 2.05 MG/DL (ref 0.76–1.27)
DEPRECATED RDW RBC AUTO: 58.4 FL (ref 37–54)
EGFRCR SERPLBLD CKD-EPI 2021: 31.8 ML/MIN/1.73
EOSINOPHIL # BLD AUTO: 0 10*3/MM3 (ref 0–0.4)
EOSINOPHIL NFR BLD AUTO: 0 % (ref 0.3–6.2)
ERYTHROCYTE [DISTWIDTH] IN BLOOD BY AUTOMATED COUNT: 16.9 % (ref 12.3–15.4)
GLOBULIN UR ELPH-MCNC: 3.4 GM/DL
GLUCOSE SERPL-MCNC: 136 MG/DL (ref 65–99)
HBA1C MFR BLD: 6.5 % (ref 4.8–5.6)
HCT VFR BLD AUTO: 43.4 % (ref 37.5–51)
HDLC SERPL-MCNC: 35 MG/DL (ref 40–60)
HGB BLD-MCNC: 13.9 G/DL (ref 13–17.7)
IMM GRANULOCYTES # BLD AUTO: 0.15 10*3/MM3 (ref 0–0.05)
IMM GRANULOCYTES NFR BLD AUTO: 1.7 % (ref 0–0.5)
LDLC SERPL CALC-MCNC: 55 MG/DL (ref 0–100)
LDLC/HDLC SERPL: 1.47 {RATIO}
LYMPHOCYTES # BLD AUTO: 0.42 10*3/MM3 (ref 0.7–3.1)
LYMPHOCYTES NFR BLD AUTO: 4.8 % (ref 19.6–45.3)
MCH RBC QN AUTO: 30.4 PG (ref 26.6–33)
MCHC RBC AUTO-ENTMCNC: 32 G/DL (ref 31.5–35.7)
MCV RBC AUTO: 95 FL (ref 79–97)
MONOCYTES # BLD AUTO: 0.35 10*3/MM3 (ref 0.1–0.9)
MONOCYTES NFR BLD AUTO: 4 % (ref 5–12)
NEUTROPHILS NFR BLD AUTO: 7.74 10*3/MM3 (ref 1.7–7)
NEUTROPHILS NFR BLD AUTO: 89.4 % (ref 42.7–76)
NRBC BLD AUTO-RTO: 0 /100 WBC (ref 0–0.2)
PLATELET # BLD AUTO: 187 10*3/MM3 (ref 140–450)
PMV BLD AUTO: 11.6 FL (ref 6–12)
POTASSIUM SERPL-SCNC: 5.8 MMOL/L (ref 3.5–5.2)
PROT SERPL-MCNC: 7 G/DL (ref 6–8.5)
RBC # BLD AUTO: 4.57 10*6/MM3 (ref 4.14–5.8)
SODIUM SERPL-SCNC: 137 MMOL/L (ref 136–145)
TRIGL SERPL-MCNC: 143 MG/DL (ref 0–150)
VLDLC SERPL-MCNC: 25 MG/DL (ref 5–40)
WBC NRBC COR # BLD AUTO: 8.67 10*3/MM3 (ref 3.4–10.8)

## 2024-07-02 PROCEDURE — 83036 HEMOGLOBIN GLYCOSYLATED A1C: CPT | Performed by: FAMILY MEDICINE

## 2024-07-02 PROCEDURE — 80061 LIPID PANEL: CPT | Performed by: FAMILY MEDICINE

## 2024-07-02 PROCEDURE — 63710000001 PREDNISONE PER 1 MG: Performed by: FAMILY MEDICINE

## 2024-07-02 PROCEDURE — 99222 1ST HOSP IP/OBS MODERATE 55: CPT | Performed by: NURSE PRACTITIONER

## 2024-07-02 PROCEDURE — 25010000002 ENOXAPARIN PER 10 MG: Performed by: FAMILY MEDICINE

## 2024-07-02 PROCEDURE — 85025 COMPLETE CBC W/AUTO DIFF WBC: CPT | Performed by: FAMILY MEDICINE

## 2024-07-02 PROCEDURE — 97162 PT EVAL MOD COMPLEX 30 MIN: CPT | Performed by: PHYSICAL THERAPIST

## 2024-07-02 PROCEDURE — 80053 COMPREHEN METABOLIC PANEL: CPT | Performed by: FAMILY MEDICINE

## 2024-07-02 PROCEDURE — 97535 SELF CARE MNGMENT TRAINING: CPT

## 2024-07-02 RX ORDER — ISOSORBIDE MONONITRATE 60 MG/1
30 TABLET, EXTENDED RELEASE ORAL
Status: DISCONTINUED | OUTPATIENT
Start: 2024-07-02 | End: 2024-07-04 | Stop reason: HOSPADM

## 2024-07-02 RX ADMIN — PREDNISONE 40 MG: 20 TABLET ORAL at 09:35

## 2024-07-02 RX ADMIN — Medication 1 TABLET: at 09:36

## 2024-07-02 RX ADMIN — SODIUM BICARBONATE 650 MG: 650 TABLET ORAL at 09:36

## 2024-07-02 RX ADMIN — SODIUM ZIRCONIUM CYCLOSILICATE 10 G: 10 POWDER, FOR SUSPENSION ORAL at 22:03

## 2024-07-02 RX ADMIN — SODIUM BICARBONATE 650 MG: 650 TABLET ORAL at 22:04

## 2024-07-02 RX ADMIN — ATORVASTATIN CALCIUM 20 MG: 10 TABLET, FILM COATED ORAL at 22:04

## 2024-07-02 RX ADMIN — ISOSORBIDE MONONITRATE 30 MG: 60 TABLET, EXTENDED RELEASE ORAL at 12:39

## 2024-07-02 RX ADMIN — ALLOPURINOL 100 MG: 100 TABLET ORAL at 09:35

## 2024-07-02 RX ADMIN — MONTELUKAST SODIUM 10 MG: 10 TABLET, FILM COATED ORAL at 22:04

## 2024-07-02 RX ADMIN — SODIUM ZIRCONIUM CYCLOSILICATE 10 G: 10 POWDER, FOR SUSPENSION ORAL at 09:37

## 2024-07-02 RX ADMIN — FLUTICASONE PROPIONATE 1 SPRAY: 50 SPRAY, METERED NASAL at 09:35

## 2024-07-02 RX ADMIN — SODIUM BICARBONATE 650 MG: 650 TABLET ORAL at 16:45

## 2024-07-02 RX ADMIN — Medication 10 ML: at 22:04

## 2024-07-02 RX ADMIN — TAMSULOSIN HYDROCHLORIDE 0.4 MG: 0.4 CAPSULE ORAL at 22:04

## 2024-07-02 RX ADMIN — ASPIRIN 81 MG: 81 TABLET, COATED ORAL at 09:35

## 2024-07-02 RX ADMIN — ENOXAPARIN SODIUM 30 MG: 100 INJECTION SUBCUTANEOUS at 09:35

## 2024-07-02 RX ADMIN — ACETAMINOPHEN 1000 MG: 500 TABLET, FILM COATED ORAL at 22:03

## 2024-07-02 RX ADMIN — PANTOPRAZOLE SODIUM 20 MG: 20 TABLET, DELAYED RELEASE ORAL at 09:36

## 2024-07-02 RX ADMIN — Medication 10 ML: at 09:37

## 2024-07-02 RX ADMIN — METOPROLOL SUCCINATE 25 MG: 25 TABLET, EXTENDED RELEASE ORAL at 09:36

## 2024-07-02 RX ADMIN — Medication 10 MG: at 22:04

## 2024-07-02 NOTE — THERAPY TREATMENT NOTE
Patient Name: Karoline Prater  : 1940    MRN: [id number]                              Today's Date: 2024       Admit Date: 2024    Visit Dx:     ICD-10-CM ICD-9-CM   1. Pneumonia of right lung due to infectious organism, unspecified part of lung  J18.9 483.8   2. Pulmonary fibrosis  J84.10 515   3. Cardiomegaly  I51.7 429.3   4. Acute kidney injury superimposed on chronic kidney disease  N17.9 584.9    N18.9 585.9   5. Elevated troponin  R79.89 790.6     Patient Active Problem List   Diagnosis    Dyspnea    Essential hypertension    NSVT (nonsustained ventricular tachycardia)    Malignant neoplasm of upper lobe of right lung    Stage 3 chronic kidney disease    Adenocarcinoma, lung    Pancytopenia due to antineoplastic chemotherapy    Pneumonia due to infectious organism    Function kidney decreased    Cellulitis    Acute respiratory failure with hypoxia    Fibrotic lung diseases    Macrocytic anemia    Thrombocytopenia    Sepsis    Right pneumothorax    Hyperkalemia    Acute renal failure superimposed on chronic kidney disease    GERD without esophagitis    A-fib    Former smoker    Chest pain     Past Medical History:   Diagnosis Date    Arthritis     Atrial fibrillation with rapid ventricular response 2019    Blindness of left eye     BPH with obstruction/lower urinary tract symptoms     Cancer     stomach & lung    CHF (congestive heart failure)     CKD (chronic kidney disease) stage 3, GFR 30-59 ml/min     Coronary artery disease     Elevated cholesterol     Essential hypertension 10/02/2017    Fibrotic lung diseases     GERD (gastroesophageal reflux disease)     Hearing loss     History of transfusion     Hydronephrosis of left kidney     Hyperlipidemia     Hypertension     pt was taken off of all of his medications for BP (atenolol, lisinopril, lasix) because his BP kept bottoming out so his primary dr told him to discontinue them 1-2 months ago (/2019). pt states he takes no  medications currently.    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 Patel is just going to keep an eye on it and not do surgery right now.    Mediastinal adenopathy 10/24/2018    Station 7    Monoclonal gammopathy of unknown significance (MGUS) 09/11/2018    Pancreatic mass     pt states he had this in 2013 but it went away on its own. Now recent CT shows it has come back so he is going to have an ultrasound on 3/13/19.    Shortness of breath      Past Surgical History:   Procedure Laterality Date    ABDOMINAL SURGERY      BRONCHOSCOPY N/A 10/24/2018    Procedure: BRONCHOSCOPY WITH BIOPSY, EBUS;  Surgeon: Gareth Becerra MD;  Location: [facility] OR;  Service: Pulmonary    CHOLECYSTECTOMY      COLONOSCOPY N/A 1/3/2019    Procedure: COLONOSCOPY WITH ANESTHESIA;  Surgeon: Randy Somers DO;  Location: [facility] ENDOSCOPY;  Service: Gastroenterology    CYSTOSCOPY, RETROGRADE PYELOGRAM AND STENT INSERTION Left 3/8/2019    Procedure: CYSTOSCOPY RETROGRADE BILATERAL PYELOGRAM;  Surgeon: Jos Sylvester MD;  Location: [facility] OR;  Service: Urology    ENDOSCOPY N/A 12/11/2018    Procedure: ESOPHAGOGASTRODUODENOSCOPY WITH ANESTHESIA;  Surgeon: Randy Somers DO;  Location: [facility] ENDOSCOPY;  Service: Gastroenterology    ENDOSCOPY N/A 4/29/2019    Procedure: ESOPHAGOGASTRODUODENOSCOPY WITH ANESTHESIA;  Surgeon: Lilliam Jj MD;  Location: [facility] OR;  Service: Gastroenterology    ENDOSCOPY N/A 5/9/2019    Procedure: ESOPHAGOGASTRODUODENOSCOPY WITH ANESTHESIA;  Surgeon: Pilo Bansal MD;  Location: [facility] ENDOSCOPY;  Service: Gastroenterology    EYE SURGERY Left 1964    FOOT SURGERY Right 1966    joint    FRACTURE SURGERY        General Information       Row Name 07/02/24 1014          OT Time and Intention    Document Type therapy note (daily note)  -ONDINA      Mode of Treatment occupational therapy  -       Row Name 07/02/24 1014          General Information    Patient Profile Reviewed yes  -     Existing Precautions/Restrictions fall  -       Row Name 07/02/24 1014          Cognition    Orientation Status (Cognition) oriented x 4  -       Row Name 07/02/24 1014          Safety Issues, Functional Mobility    Impairments Affecting Function (Mobility) balance;endurance/activity tolerance;strength;shortness of breath  -               User Key  (r) = Recorded By, (t) = Taken By, (c) = Cosigned By      Initials Name Provider Type     Susanne Lima OTR/L Occupational Therapist                     Mobility/ADL's       Row Name 07/02/24 1014          Bed Mobility    Bed Mobility supine-sit  -     Supine-Sit [place] (Bed Mobility) modified independence  -     Assistive Device (Bed Mobility) bed rails;head of bed elevated  -[facility] Name 07/02/24 1014          Transfers    Transfers sit-stand transfer;stand-sit transfer  -[facility] Name 07/02/24 1014          Sit-Stand Transfer    Sit-Stand [place] (Transfers) standby assist  -       Row Name 07/02/24 1014          Stand-Sit Transfer    Stand-Sit [place] (Transfers) standby assist  -[facility] Name 07/02/24 1014          Functional Mobility    Functional Mobility- Ind. Level standby assist;contact guard assist  -       Row Name 07/02/24 1014          Activities of Daily Living    BADL Assessment/Intervention toileting  -       Row Name 07/02/24 1014          Toileting Assessment/Training    [place] Level (Toileting) toileting skills;supervision  -     Position (Toileting) unsupported standing  -               User Key  (r) = Recorded By, (t) = Taken By, (c) = Cosigned By      Initials Name Provider Type    Susanne Hickman, OTR/L Occupational Therapist                   Obj/Interventions    No documentation.                  Goals/Plan    No documentation.                   Clinical Impression       Row Name 07/02/24 1014          Pain Assessment    Pretreatment Pain Rating 0/10 - no pain  -LS     Posttreatment Pain Rating 0/10 - no pain  -LS       Row Name 07/02/24 1014          Plan of Care Review    Plan of Care Reviewed With patient  -LS     Progress improving  -LS     Outcome Evaluation OT tx completed. Pt in fowlers upon therapist arrival; A&Ox4; No c/o pain; SpO2 93% on RA at rest. Pt performed supine>sit with Mod A. Pt performed all sit<>stand transfers with SBA. Pt ambulated short distance in hallway and to/from BR with SBA. Pt performed all toileting tasks with supervision. SpO2 remained 90% and above on RA but Pt did experience some SOB. Cont OT POC. Recommend home at discharge.  -       Row Name 07/02/24 1014          Therapy Plan Review/Discharge Plan (OT)    Anticipated Discharge Disposition (OT) home  -       Row Name 07/02/24 1014          Positioning and Restraints    Pre-Treatment Position in bed  -LS     Post Treatment Position bed  -LS     In Bed sitting EOB;call light within reach;encouraged to call for assist;side rails up x2  -LS               User Key  (r) = Recorded By, (t) = Taken By, (c) = Cosigned By      Initials Name Provider Type    LS Susanne Lima, OTR/L Occupational Therapist                   Outcome Measures       Row Name 07/02/24 1014          How much help from another is currently needed...    Putting on and taking off regular lower body clothing? 3  -LS     Bathing (including washing, rinsing, and drying) 3  -LS     Toileting (which includes using toilet bed pan or urinal) 3  -LS     Putting on and taking off regular upper body clothing 4  -LS     Taking care of personal grooming (such as brushing teeth) 4  -LS     Eating meals 4  -LS     AM-PAC 6 Clicks Score (OT) 21  -       Row Name 07/02/24 1010 07/01/24 2320       How much help from another person do you currently need...    Turning from your back to your side while in flat bed  without using bedrails? 4  -KR 4  -KRA    Moving from lying on back to sitting on the side of a flat bed without bedrails? 4  -KR 4  -KRA    Moving to and from a bed to a chair (including a wheelchair)? 4  -KR 4  -KRA    Standing up from a chair using your arms (e.g., wheelchair, bedside chair)? 4  -KR 4  -KRA    Climbing 3-5 steps with a railing? 3  -KR 3  -KRA    To walk in hospital room? 3  -KR 3  -KRA    AM-PAC 6 Clicks Score (PT) 22  -KR 22  -KRA    Highest Level of Mobility Goal 7 --> Walk 25 feet or more  -KR 7 --> Walk 25 feet or more  -KRA      Row Name 07/02/24 1014 07/02/24 1010       Functional Assessment    Outcome Measure Options AM-PAC 6 Clicks Daily Activity (OT)  -LS AM-PAC 6 Clicks Basic Mobility (PT)  -KR              User Key  (r) = Recorded By, (t) = Taken By, (c) = Cosigned By      Initials Name Provider Type    Magda Mcgowan, PT DPT Physical Therapist    Susanne Hickman, OTR/L Occupational Therapist    Janki Durham, RN Registered Nurse                    Occupational Therapy Education       Title: PT OT SLP Therapies (In Progress)       Topic: Occupational Therapy (In Progress)       Point: ADL training (Done)       Description:   Instruct learner(s) on proper safety adaptation and remediation techniques during self care or transfers.   Instruct in proper use of assistive devices.                  Learning Progress Summary             Patient Acceptance, E, VU by ONDINA at 7/1/2024 1332                         Point: Home exercise program (Not Started)       Description:   Instruct learner(s) on appropriate technique for monitoring, assisting and/or progressing therapeutic exercises/activities.                  Learner Progress:  Not documented in this visit.              Point: Precautions (Done)       Description:   Instruct learner(s) on prescribed precautions during self-care and functional transfers.                  Learning Progress Summary             Patient Acceptance,  E, VU by  at 7/1/2024 1332                         Point: Body mechanics (Done)       Description:   Instruct learner(s) on proper positioning and spine alignment during self-care, functional mobility activities and/or exercises.                  Learning Progress Summary             Patient Acceptance, E, VU by  at 7/1/2024 1332                                         User Key       Initials Effective Dates Name Provider Type Discipline     06/20/22 -  Susanne Lima OTR/L Occupational Therapist OT                  OT Recommendation and Plan  Planned Therapy Interventions (OT): activity tolerance training, functional balance retraining, occupation/activity based interventions, ROM/therapeutic exercise, strengthening exercise, transfer/mobility retraining, patient/caregiver education/training, adaptive equipment training, BADL retraining  Therapy Frequency (OT): 5 times/wk  Plan of Care Review  Plan of Care Reviewed With: patient  Progress: improving  Outcome Evaluation: OT tx completed. Pt in fowlers upon therapist arrival; A&Ox4; No c/o pain; SpO2 93% on RA at rest. Pt performed supine>sit with Mod A. Pt performed all sit<>stand transfers with SBA. Pt ambulated short distance in hallway and to/from BR with SBA. Pt performed all toileting tasks with supervision. SpO2 remained 90% and above on RA but Pt did experience some SOB. Cont OT POC. Recommend home at discharge.     Time Calculation:         Time Calculation- OT       Row Name 07/02/24 1014             Time Calculation- OT    OT Start Time 1016  -LS      OT Stop Time 1030  -      OT Time Calculation (min) 14 min  -      Total Timed Code Minutes- OT 14 minute(s)  -      OT Received On 07/02/24  -                User Key  (r) = Recorded By, (t) = Taken By, (c) = Cosigned By      Initials Name Provider Type     Susanne Lima OTR/L Occupational Therapist                  Therapy Charges for Today       Code Description Service Date Service  Provider Modifiers Qty    95169773599 HC OT EVAL LOW COMPLEXITY 2 7/1/2024 Susanne Lima, OTR/L GO 1    14595523361 HC OT SELF CARE/MGMT/TRAIN EA 15 MIN 7/2/2024 Suasnne Lima OTR/L GO 1                 Susanne Lima OTR/L  7/2/2024

## 2024-07-02 NOTE — PROGRESS NOTES
"Enter Query Response Below      Query Response: -Acute kidney injury (CAROLINA) was not supported              If applicable, please update the problem list.         Patient: Karoline Prater \"Fahad\"        : 1940  Account: [id number]           Admit Date:         How to Respond to this query:       a. Click New Note     b. Answer query within the yellow box.                c. Update the Problem List, if applicable.      If you have any questions about this query contact me at: [email or web address]     :    84-year-old male with history of lung cancer and CKD stage III admitted () with chest pain. Your progress note () lists, Acute on chronic stage III renal failure\" as diagnoses and documents, \"Genitourinary:  Negative for decreased urine volume, difficulty urinating, dysuria, flank pain, frequency and hematuria.\" Nursing flowsheets document lack of output from admission (925) until 300cc documented as voided (2001). Unable to locate documented baseline creatinine. H&P documents creatinine is \"higher than baseline creatinine.\"   Creatinine:  () 2.77  () 2.47  () 2.05      After study, was acute kidney injury (CAROLINA) clinically supported during this admission?    -Acute kidney injury (CAR[place]) was supported with additional clinical indicators:____________  -Acute kidney injury (CAR[place]) was not supported  -Other- specify_____________  -Unable to determine     CAROLINA is defined by KDIGO as any of the following:  Increase in creatinine > 0.3 mg/dl within 48 hours or less; or   Increase in creatinine level to > 1.5x baseline (historical or measure), which is known or presumed to have occurred within the prior 7 days   Urine volume <0.5 ml/kg/h for 6 hours.  Reference article: http://www.kdigo.org/clinical_practice_guidelines/pdf/KDIGO%20AKI%20Guideline.pdf (as published in [employer] Supp[employer], The Official Journal of the International Society of Nephrology, vol. 2, " issue 1, March 2012.)      By submitting this query, we are merely seeking further clarification of documentation to accurately reflect all conditions that you are monitoring, evaluating, treating or that extend the hospitalization or utilize additional resources of care. Please utilize your independent clinical judgment when addressing the question(s) above.     This query and your response, once completed, will be entered into the legal medical record.    Sincerely,  Myra Cunha MSN, RN   Clinical Documentation Integrity Program

## 2024-07-02 NOTE — TELEPHONE ENCOUNTER
CONSULT RECEIVED FROM LAURENCE AT [facility].  PT IS KNOWN TO [facility]M AND IS CURRENTLY FOLLOWED BY DR. STONE.    Name:        Sophia \"Prabhjot\" Evens  :          44  Room:        [facility]  DX:            Chest Pain; Hx of lung cancer  Consult MD:  Dr. Gomez  Nurse:        Laurence    [phone]

## 2024-07-02 NOTE — PLAN OF CARE
Goal Outcome Evaluation:  Plan of Care Reviewed With: patient        Progress: improving  Outcome Evaluation: OT tx completed. Pt in fowlers upon therapist arrival; A&Ox4; No c/o pain; SpO2 93% on RA at rest. Pt performed supine>sit with Mod A. Pt performed all sit<>stand transfers with SBA. Pt ambulated short distance in hallway and to/from BR with SBA. Pt performed all toileting tasks with supervision. SpO2 remained 90% and above on RA but Pt did experience some SOB. Cont OT POC. Recommend home at discharge.      Anticipated Discharge Disposition (OT): home

## 2024-07-02 NOTE — CONSULTS
MEDICAL ONCOLOGY CONSULTATION    Pt Name: Karoline Prater  MRN: [id number]  [id number]  YOB: 1940  Date of evaluation: 7/2/2024    Reason for Consultation: Continuity of care with a diagnosis of stage IV lung cancer    Requesting Physician: Dr. 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     He has a remote history of a pancreatic mass identified and biopsied by Dr. Alexis on 10/29/03.  Pathology negative on open biopsy.    Fahad completed 4 cycles of combination chemotherapy with carboplatin, Keytruda, Alimta on 7/5/2019.   Maintenance Keytruda and Alimta every 3 weeks was delivered.   The final cycle number #25 of Keytruda/Alimta was delivered on 10/29/2020.  Harry has been on a chemotherapy holiday since October 2020 due to issues of dyspnea and shortness of breath, requiring episodic steroids.     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, but still at baseline without exacerbations.    Imaging studies with CT scan of the chest on 11/9/2023 and a follow-up PET scan on 12/7/2023 documented localized progressive disease in the RUL of the lung.  He was referred to Dr. Danny Cool at [facility] for SBRT.    SBRT by Dr. Danny Cool was delivered in 5 treatment fractions for a total dose of 5000 cGy. XRT was initiated 1/24/2024 and was completed on 2/7/2024    Imaging were performed on 3/20/2024 to assess response to treatment and restaging on a chemotherapy.    CT CHEST WO CONTRAST AT [facility] on 3/20/2024: Compared to 11/08/2023  1.3 cm partially calcified subcarinal lymph node, unchanged.   2.0 x 1.5 x 1.9 cm right upper lobe mass, previously  2.0 x 2.5 x 1.9 cm   1.8 x 3.0 x 2.0 right upper lobe mass, previously 3.5 x 1.9 x 1.6 cm   Emphysematous changes with subpleural reticulations and possible honeycombing and fibrosis are noted with pleural thickening again observed along the right lateral chest wall extending to the diaphragmatic surface.   There are scattered subpleural calcifications     CT ABDOMEN PELVIS WO CONTRAST AT [facility] on 3/20/2024: compared to 11/08/2023  No evidence of metastasis in the abdomen/pelvis.       Mr. Prater was admitted on 6/29/2024 with chest pain on exertion elevated troponin level for cardiac evaluation and management.    ACTIVE or ASSOCIATED PROBLEMS:  Pulmonary fibrosis secondary to previous history of smoking and drilling rock for blasting at the [employer]   CKD associated anemia responding to Procrit.   Fahad has benign prostatic hypertrophy (BPH) that is stable on Flomax.   Orthostatic hypotension resolved with adjustment of metoprolol by Dr. Alexis   He takes Cozaar, metoprolol, Lasix and spironolactone without new cardiac issues.   Protonix continues without any new exacerbations of GERD.   Lower extremity edema overall has actually improved to some degree       DETAILED TUMOR AND HEMATOLOGICAL HISTORIES COPIED FROM MY OFFICE RECORDS    TUMOR HISTORY: Adenocarcinoma on the RUL of the lung 11/27/2018  Karoline had CT scans performed for new onset of weight loss of 13 pounds over the previous year , associated with increased fatigue.   He denied respiratory symptoms of shortness of air, cough or productive sputum.    A CT scan of the chest on 9/12/2018 had been ordered by Dr. Irving Alexis due to weight loss and identified a new lobulated right upper lobe 5.7 cm mass that extended back to the right hilum.   Numerous mediastinal lymph nodes ranging in size from mm to 1.3 cm and a subcarnial lymph node that measured 1.5 x 1.5 x 3.5 cm.    A CT scan of the abdomen and pelvis with contrast on 9/12/2018 documented a 4.9 x 4 x 4  cm soft tissue mass with peripheral calcification immediately adjacent to the gallbladder in the head of the pancreas area.    An MRI of the abdomen and pelvis W & WO on 10/11/2018 identified in the 4.1 x 3.4 cm soft tissue mass in the subhepatic space, abutting the second portion of the duodenum with mild displacement of the duodenum. There does not appear to be involvement of the pancreas, and the pancreas appears within normal limits.  Differential diagnoses include a desmoid tumor, less likely leiomyoma of the wall of the duodenum or malignancy.   Dr. Michele requested a CT-guided biopsy of the right upper lobe mass.   Dr. Rosales, the radiologist, evaluated the area with a repeat CT scan of the chest on 10/11/2018.   He spoke with Dr. Michele indicating that he would not be able to perform the biopsy because the tumor was not accessible, it was under the scapula , which blocked access and therefore the biopsy procedure was canceled.    On the CT scan of the chest on 10/11/2018 measurements of the RUL lung mass was 5.7 x 3.2 cm with irregular margins suspicious for a primary lung neoplasm. Soft tissue associated with the tumor appeared to extend into the bronchus supplying this portion of the RUL of the lung.   Dr. Rosales indicated that the mass had an endobronchial component and should be easily assessable via bronchoscopy.    The chest CT also included a portion of the upper abdomen where a soft tissue mass was described in an addendum report. In the subhepatic space measuring 4.0 x 3.9 cm, displacing the second portion of the duodenum medially.  A referral was made to Dr. Becerra for consideration for bronchoscopy for biopsy.    On 10/24/2018, Dr. Gareth Becerra performed a bronchoscopy with EBUS guided biopsy of a 3 cm subcarinal lymph node along with bronchoalveolar lavage of the posterior segment of the RUL of the lung.  The final pathology of the station 7 lymph node only revealed a background of small  mature lymphocytes with clusters of histiocytes suggestive of granuloma.  Negative for malignant cells.  Kinyoun and GMS stains were negative for AFB and fungal organisms respectively.  The bronchial washings were negative for malignant cells as well.     Mr. Prater was referred to Dr. Meenakshi Polanco at [facility] [place] in [place], [place], for navigational bronchoscopy and biopsy under ultrasound.    On 11/27/2018 Dr. Meenakshi Polanco performed a bronchoscopy, navigational protocol, endobronchial ultrasound and biopsy of the RUL of the lung mass along with multiple lymph node station Biopsies.  Pathology revealed the following:  Poorly differentiated Adenocarcinoma from the RUL of the lung mass on biopsy and bronchoalveolar lavage consistent with a lung primary.   All lymph nodes sampled were NEGATIVE for malignant cells including stations 10L, 4L, station 7, 10R, 4R.   IHC studies were positive for CK7, TTF-1 and Napsin A.   IHC studies on tumor cells were negative for CDX2, CK5/6, and p63   Further lung profile molecular testing is pending    All of the above was discussed at length with Mr. Prater at his 12/13/2018 clinic visit.   He was agreeable for referral for consideration of resection of the lung lesion.     He is comfortable with and would like a referral to Dr. Ulysses Bardales ([phone]) at [facility], [address] [place] [address], [address], [place] [address].  Logistics, however, are planning a big part in his decision making and he may decide to have surgery done in the [place] area.    If he decides to have surgery in the [place], referral will be made to Dr. Frandy Patel.     CT chest with contrast 2/18/2019 at [facility] compared to 9/12/2018 revealed a 4.9 x 3.5 cm spiculated RUL lung mass which actually decreased in size from a prior value of 6.1 x 3.6 cm.   Subhepatic mass adjacent to the descending duodenum had increased in size significantly to 4.3 x 9 cm compared to 4.1 x 3.4  cm on the 10/11/18 MRI of the abdomen.    CT of the abdomen and pelvis without contrast on 3/20/2019 at [facility] was compared to outpatient CT data and pelvis on 9/12/2018 an MRI of the abdomen from 10/11/2018. A soft tissue mass was again encountered in the subhepatic space which abutted the distal stomach and proximal duodenum measuring 8.9 x 5.4 cm which has increased considerably from a prior measurement of 4.1 x 3.4 cm. Medial to the left renal hilum a 3.5 cm lobular mass causing some mild left-sided hydronephrosis.    Mr. Prater was referred to Dr. Frandy Patel who saw him on 1/22/2019 anticipating plans for a curative resection.     Dr. Michele received a phone call on 2/20/2019 from Dr. Patel , indicating that the previously documented an abdominal pancreatic area mass had doubled in size and was causing compression into the left kidney/renal pelvis producing a hydroureter.     Dr. Patel arranged a referral to Dr. Sylvester who will be placing a stent 3/8/2019.    Mr. Prater was scheduled to be seen by gastroenterology at [facility] on 3/13/2019 for EGD/EUS assessment and biopsy of the enlarging peripancreatic/duodenal area mass.  With a decrease in the lung mass and increased size of the subhepatic mass-surgical resection was abandoned at present pending further evaluation of the subhepatic mass.  Dr. Michele discussed treatment options with the unresectable lung mass and the per he pancreatic mass and recommended a combination of Keytruda, Alimta, carboplatin.  He was subsequently admitted to [facility] 4/26 - 4/30/2019 with abdominal pain and melena. WBC pinky was 0.75 with ANC 0.34. PLT did drop to 24,000. He did have duodenal ulcerations that were bleeding on endoscopy. He was stabilized but then readmitted from 5/8 - 5/10/2019 with recurrent melanoma. A repeat endoscopy was performed. It was felt that exacerbation of his bleeding from the duodenal came about by his thrombocytopenia from treatment. Subsequent  dosing was adjusted and Neulasta was added.    CT chest without contrast at [facility] on 6/27/2019 was compared to 2/18/2019 revealed that the right lung mass that extended into the right hilum has decreased from 5.2 x 3.5 down to 4.6 x 3.6. There is increased central cavitation in the mass consistent with tumor necrosis. Mediastinal lymphadenopathy is relatively stable.    CT abdomen and pelvis without contrast at [facility] on 6/27/2019 was compared to 4/26/2019 revealed complete resolution of the previously identified duodenal/subhepatic space soft tissue mass. The previously identified heterogenous enhancing lesion in the left renal pelvis was much less prominent but there does continue to be some mild left caliectasis.    I reviewed the CT results with Dr. Michele on 7/5/2019 who then relayed them as well to Karoline. We've had excellent results from this combination of therapy.    He completed the fourth combination therapy of Keytruda, carbo, Alimta on 7/5/2019 and then transitioned to maintenance Keytruda plus Alimta.    CT chest without contrast at [facility] on 1/9/2020 was compared to 10/17/2019 revealing:   A stable spiculated RUL nodule/mass with similar mediastinal adenopathy.   Questionable right hilar adenopathy with diminished assessment due to lack of IV contrast.   Advanced emphysema with severe pulmonary fibrosis.   No new pulmonary nodules.    CT abdomen and pelvis without contrast at [facility] on 1/9/2020 was compared to 10/17/2019 revealing:   Mildly prominent aortocaval RP lymph nodes with position just below the level of the renal vein worrisome for potential lymphatic metastases. This is similar to 10/17/2019 but increased from 4/26/2019.   Pneumobilia without discrete suspicious focal lesion in the liver.   Wall thickening of the duodenum.   Similar and stable renal lesions favoring cysts.    He has had numerous endoscopies within the past year.   While he was having an Endo EUS and had cardiac issues and was  actually admitted to the intensive care unit.     CT abdomen and pelvis without contrast on 7/16/2020 at [facility] was compared to 1/9/2020 and documented:  1.5 x 0.9 cm aortocaval lymph node, previously 1.9 x 1.2 cm  No new abnormality seen    CT chest without contrast on 11/12/2020 at [facility] ws compared to 1/9/2020 and documented:  Mixed response therapy with interval decrease in size in the RIGHT upper lobe pulmonary nodule but increase in size and mediastinal lymph nodes.  In addition, there is severe emphysema with findings of severe pulmonary fibrosis. Interval worsening of interstitial opacities bilaterally as well as suggestion of some pleural nodularity. Lymphangitic spread of malignancy should be considered.  Small pleural effusion on the RIGHT is new    CT abdomen and pelvis without contrast on 11/12/2020 at [facility] was compared to 7/16/2020 and documented:  The aortic caval node described on the comparison study is not significantly changed in size when measured at the same location.  Nonobstructing renal calculus on the LEFT. No change in the indeterminate renal lesions on the LEFT, likely cysts.  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   Fahad has benign prostatic hypertrophy (BPH) that is stable on Flomax.   Orthostatic hypotension resolved with adjustment of metoprolol by Dr. Alexis   He takes Tambocor, metoprolol without new cardiac issues.   Protonix continues without any new exacerbations of GERD.   Lower extremity edema overall has actually improved to some degree.    Fahad received his final cycle #25 of Keytruda/Alimta on 10/29/2020.   Treatment was held since that time due to issues of dyspnea and shortness of breath.    A course  of steroids (prednisone) was initiated and antibiotics also delivered and although his respiratory issues improved, he has continued to have issues with weight loss.    CXR on 12/17/2020 documented no interval change, but in my personal review of the x-ray, there is significant pulmonary fibrosis not significantly changed compared to the x-ray from October 2020.    I discussed the findings on the chest x-ray and the comparison at his clinic visit on 12/30/2020. He does not appear to be improving but rather quite stable.  Vianey has pulmonary fibrosis as documented on a CT scan of the chest without contrast at [facility] on 11/12/2020.  Fahad is in agreement to go on a chemotherapy holiday with a repeat CT scan of the chest in 3 months and monitor his situation clinically and radiographically without systemic intervention going forward.    CT CHEST WO contrast on 3/17/2021, compared to 11/12/2020 at [facility] documented:   Mixed response therapy with interval decrease in size in the RIGHT upper lobe pulmonary nodule but increase in size and mediastinal lymph nodes.  Slight interval decrease in size in the RIGHT upper lobe nodule/mass measuring 4.7 x 2.2 cm today, previously 5.0 x 2.4 cm. RIGHT apical nodular density measuring up to 0.6 cm, previously 0.4 cm.  In addition, there is severe emphysema with findings of severe pulmonary fibrosis. Interval worsening of interstitial opacities bilaterally as well as suggestion of some pleural nodularity. Lymphangitic spread of malignancy should be considered.  Small pleural effusion on the RIGHT is new.    CT ABD & PELVIS WO contrast on 3/17/2021, compared to 11/12/2020, at [facility] documented:  Nonobstructing calculus lower pole left kidney  Low-density lesions associated with the left renal contour probably proteinaceous cyst these are unchanged  Chronic right lateral pleural complex in the lung base with bilateral small basilar pneumothoraces..     XR CHEST (2 VW) 4/21/2021 at [facility] ,  compared to December 17, 2020, documented:  Advanced interstitial fibrotic changes noted throughout the pulmonary parenchyma unchanged from December 17, 2020.  Small to moderate right lateral pleural complex. This may be pleural fluid or thickening is unchanged December 17, 2020.    XR CHEST (2 VW) 6/16/2021:  2.5 cm area of residual nodularity versus scarring in the RUL zone.  Probable small pleural effusions.  Bilateral interstitial infiltrates stable compared to the 2 most recent studies but increased compared to the 2019 study.   There may beunderlying pulmonary fibrosis that is worsening.   Pulmonary edema is not ruled out.     CT CHEST WO CONTRAST DIAGNOSTIC at [facility]- 11/15/2021:Comparison: CT chest without contrast 8/25/2021  3.0 x 2.2 cm spiculated mass in the right upper lobe, stable  There is pleural thickening lateral to the mass with bands of probable fibrosis, stable   4 mm RUL there is a small stellate shaped nodule, previously 5 mm  3 mm nodule in the right lower lobe posterior to the major fissure, unchanged  There is a calcified granuloma in the medial basal segment left lower lobe   Small calcified pleural plaques in the upper left hemithorax as before.  There are several partially calcified mediastinal lymph nodes which appears stable  There is increased pleural thickening along the anterior margin of the right lower lobe.     CT ABDOMEN PELVIS WO CONTRAST at [facility]- 11/15/2021:Comparison: CT abdomen pelvis without contrast 3/17/2021  Review of lung windows demonstrates extensive reticular interstitial fibrosis in the lower lung zones, as well as loculated pleural fluid in the right lateral lung base with pleural thickening   10 mm.hyperattenuating exophytic nodule at the inferior pole the left kidney   7 mm nephrolithiasis at the inferior pole the left kidney.  The prostate gland is enlarged  There is grade 1 spondylolisthesis at L5-S1 with disc space collapse. This is stable    XR CHEST (2 VW) on  3/3/2022 at [facility], compared to 8/16/21, documented:  A 1.7 cm opacity in the right midlung zone appears smaller on the current study, previously measuring 2.5 cm.  Stable blunting of the costophrenic angles right greater than left.  Coarse interstitial lung disease appears relatively stable. Probable interstitial fibrosis    X-RAYS OF THE CHEST at [facility] on 6/29/2022:  The lungs show moderately severe fibrosis and COPD with pleural reaction blunting the right costophrenic sulcus  Small area consolidation noted right upper lobe    X-RAYS OF THE CHEST at [facility] on 9/28/2022:  The right-sided MediPort tip overlying the projection of the superior vena cava  The aorta is calcified and tortuous  The heart is enlarged in size.   There are diffuse interstitial opacities throughout the parenchyma bilaterally with a small right effusion.   There is a nodular density in the right upper lung field.    The RUL abnormality on the chest x-ray from today is likely residual scarring noted on the previous CT scan from November 2021.    CT chest without contrast at [facility] on 11/16/2022:COMPARISON: 11/15/2021, 8/25/2021  9 mm RIGHT upper lobe pulmonary nodule, previously 4 mm  3.0 x 1.9 cm spiculated mass in the RIGHT upper lobe abutting the major minor fissures, unchanged  Chronic interstitial thickening, bronchiectasis, and interstitial fibrosis is similar to multiple prior studies.   Small RIGHT pleural effusion.   No change in a few borderline prominent partially calcified mediastinal lymph nodes    CT ABDOMEN PELVIS WO CONTRAST at [facility] on 11/16/2022:COMPARISON: 11/15/2021   There is mild cardiomegaly with mitral annulus calcifications and coronary artery calcification  Trace pericardial effusion or pericardial thickening is present.  There is interstitial fibrosis within the lung bases with honeycombing.   There is a loculated effusion within the right lateral costophrenic angle stable from the previous exam  There are cysts involving the  left kidney.   More complex exophytic lesions involving the posterior midpole and the left lower pole may represent hemorrhagic cysts.  Anterolisthesis of L4 on L5 and L5 on S1 is present suggesting subluxation at the level of the facets at L4-L5  The prostate gland is mildly enlarged    CT CHEST WO on 11/9/2023 at [facility], compared to 11/16/22  multiple mediastinal lymph nodes, most of which are partially calcified which appear similar in size and number since the prior study. The largest measure up to 1.3 cm short axis subcarinal region and 1.2 cm short axis AP window  2.6 x 1.9 cm anterior right apical nodular consolidation, significantly increased in size, previously 0.9 cm.  3.6 x 1.9 cm Spiculated nodule right upper lobe, unchanged in size  1.2 x 1.1 cm additional perifissural opacity along the anterior minor fissure   Emphysema, bronchial thickening and peripheral reticular opacities present throughout both lungs. Stable emphysema.  Trace amount of pleural fluid lateral right lung base  Degenerative changes present in the spine. No discrete osseous lesion detected    CT ABDOMEN PELVIS WO on 11/9/23 at [facility], compared to 1/5/23  Pneumobilia, increased from prior. This may be seen following sphincterotomy or other hepatobiliary procedures. Recommend correlation with operative history and hepatic enzyme levels. No portal venous gas or bowel pneumatosis  Nonobstructing let nephrolithiasis and unchanged left renal cysts  Moderate quantity of stool throughout the colon    PET CT SKULL BASE TO MID THIGH on 12/13/23, compared to CT 11/08/23  2.6 x 1.9 cm anterior right apical nodular consolidation, SUV 14.8, consistent with malignancy until proven otherwise  3.6 x 1.9 cm RUL spiculated nodule, SUV 5.34, consistent with increased likelihood of malignancy  No additional significantly FDG avid nodules are seen within the lung fields   Right hilar lymph node SUV 3.90  Right suprahilar lymph node SUV 4.26  Enlarged subcarinal  node SUV 2.58  Nonenlarged precarinal node SUV 2.26  AP window node SUV 2.82  Additional smaller/less FDG avid nodes not individually described  In the head and neck, the included portions of the orbits and paranasal sinuses demonstrate normal levels of activity   The examination demonstrates a generally physiologic distribution of activity in the abdomen and pelvis   The examination demonstrates a generally physiologic distribution of activity in the included portions of the skeleton and extremeties     Fahad continues to have chronic dyspnea on exertion associated with pulmonary fibrosis from smoking history 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    Imaging studies with CT scan of the chest on 11/9/2023 and a follow-up PET scan on 12/7/2023 documented progressive disease in the RUL of the lung.  He was referred to Dr. Danny Cool at [facility] for SBRT.    SBRT by Dr. Danny Cool was delivered in 5 treatment fractions for a total dose of 5000 cGy. XRT was initiated 1/24/2024 and was completed on 2/7/2024    CT CHEST WO CONTRAST AT [facility] on 3/20/2024: Compared to 11/08/2023  1.3 cm partially calcified subcarinal lymph node, unchanged.   2.0 x 1.5 x 1.9 cm right upper lobe mass, previously 2.0 x 2.5 x 1.9 cm   1.8 x 3.0 x 2.0 right upper lobe mass, previously 3.5 x 1.9 x 1.6 cm   Emphysematous changes with subpleural reticulations and possible honeycombing and fibrosis are noted with pleural thickening again observed along the right lateral chest wall extending to the diaphragmatic surface.   There are scattered subpleural calcifications     CT ABDOMEN PELVIS WO CONTRAST AT [facility] on 3/20/2024: compared to 11/08/2023  No evidence of metastasis in the abdomen/pelvis.   Multiple left renal cysts. Nonobstructing 4 mm stone at the left lower pole   Mild colonic diverticulosis   No aggressive osseous lesion identified   Multilevel degenerative spondylosis and mild  dextroconvex scoliosis. Grade 1 anterolisthesis at L4-L5 and L5-S1. Right unilateral L5 pars defect.    TREATMENT SUMMARY  Keytruda, carboplatin, Alimta initiated 4/18/2019 to be given every 3 weeks for 4 cycles - 4th cycle 7/5/2019, then Keytruda + Alimta as maintenance to continue indefinitely until progression or intolerable side effects   Fahad received cycle #25 of Keytruda/Alimta on 10/29/2020. He was then placed on an indefinite chemotherapy holiday on 12/30/2020  SBRT by Dr. Danny Cool was delivered in 5 treatment fractions for a total dose of 5000 cGy. XRT was initiated 1/24/2024 and was completed on 2/7/2024        #2   TUMOR HISTORY: Pancreatic mass diagnosed 10/29/03  Mr. Prater presented in October 2003 with obstructive jaundice.   A CT scan of the abdomen on 10/26/2003 documented a 3.2 x 4 x 4.2 cm mass in the head of the pancreas.     On 10/29/03 Dr. Alexis performed a resection of a portion of the head of the pancreas on Fahad Prater along with a Klaudia-en-Y procedure and a choledochojejunostomy for what was felt to be a pancreatic cancer. His pancreas was bypassed because of the findings.  Pathology of the biopsies were negative for malignancy showing a fibrosed pancreas.   Mr. Prater was referred to Dr. Vishal High in [place].    Dr. Vishal High performed ERCP with an EUS and biopsy on 02/26/04   Pathology again was negative for cancer or malignancy.   Routine periodic serologic and radiographic monitoring has not disclosed progression of the mass or development of new malignancy or increase in pancreatic tumor markers.     A CT scan of the abdomen and pelvis with contrast on 9/12/2018 documented a 4.9 x 4 x 4 cm soft tissue mass with peripheral calcification immediately adjacent to the gallbladder in the head of the pancreas area.    An MRI of the abdomen and pelvis W & WO on 10/11/2018 identified in the 4.1 x 3.4 cm soft tissue mass in the subhepatic space, abutting the second portion of the  duodenum with mild displacement of the duodenum. There does not appear to be involvement of the pancreas, and the pancreas appears within normal limits.  Differential diagnoses include a desmoid tumor, less likely leiomyoma of the wall of the duodenum or malignancy.    CT of the abdomen and pelvis without contrast on 3/20/2019 at [facility] was compared to outpatient CT data and pelvis on 9/12/2018 an MRI of the abdomen from 10/11/2018. A soft tissue mass was again encountered in the subhepatic space which abutted the distal stomach and proximal duodenum measuring 8.9 x 5.4 cm which has increased considerably from a prior measurement of 4.1 x 3.4 cm. Medial to the left renal hilum a 3.5 cm lobular mass causing some mild left-sided hydronephrosis.    Mr. Prater was scheduled for an EGD/EUS by Dr. Bryson Moe as an outpatient procedure on 3/13/2019 for assessment and biopsy of the enlarging peripancreatic/duodenal area mass. Unfortunately, after initiation of the procedure, he began having ventricular tachycardia and the procedure had to be aborted. Karoline was transferred to the ICU for cardiology evaluation and stabilization. He remained hospitalized until 3/18/2019 when he was medically cleared for discharge.    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 No emergent urologic intervention was warranted and he received monitoring of renal function. He had a creatinine level of 1.9 at discharge.    PET scan on 4/2/2019 identified a soft tissue mass in the RUL measuring 1.2 x 3.5 cm with extension to the right anterior hilum with an SUV of 10.1. Evidence of mediastinal and hilar lymphadenopathy, with low-level metabolic activity. Interval increase in the size of a large mass in the right upper abdomen inseparable from the duodenum measuring 10.7 x 6.9 cm compared to 5.4 x 8.9 cm on 2/20/2019 with an SUV of 23.6.  Slight increase in 2 small inseparable masses medial to the hilum of the left kidney measuring 2.2 and 2.6 cm and the other measuring 3.9 cm with a maximum SUV of 18.6.    Cycle #1 of palliative chemotherapy with Carboplatin, Alimta and Keytruda was initiated 4/18/19 which should also cover this process.    Abdominal/pelvic CT 4/26/19 was performed at [facility] due to abdominal pain and melena. The RUQ mass, within the duodenum decreased to 6.8 x 6.2 cm (was 10.7 x 6.9 cm on PET 4/2/19)    CT abdomen and pelvis without contrast at [facility] on 6/27/2019 was compared to 4/26/2019 revealed complete resolution of the previously identified. Duodenal/subhepatic space soft tissue mass. The previously identified heterogenous enhancing lesion in the left renal pelvis was much less prominent but there does continue to be some mild left caliectasis.      CT abdomen and pelvis without contrast at [facility] on 1/9/2020 was compared to 10/17/2019 revealing:   Mildly prominent aortocaval RP lymph nodes with position just below the level of the renal vein worrisome for potential lymphatic metastases. This is similar to 10/17/2019 but increased from 4/26/2019.   Pneumobilia without discrete suspicious focal lesion in the liver.   Wall thickening of the duodenum.   Similar and stable renal lesions favoring cysts.    CT ABD & PELVIS WO contrast on 3/17/2021, compared to 11/12/2020, at [facility] documented:  Nonobstructing calculus lower pole left kidney  Low-density lesions associated with the left renal contour probably proteinaceous cyst these are unchanged  Chronic right lateral pleural complex in the lung base with bilateral small basilar pneumothoraces..     He has had numerous endoscopies within the past year. When he was having an Endo EUS he had cardiac issues and required to the intensive care unit.     This area will just be monitored on follow-up scans without further elective endoscopic surveillance..    Mr. Prater requests that imaging studies  only be done yearly.     CT ABDOMEN PELVIS WO CONTRAST at [facility]- 11/15/2021:Comparison: CT abdomen pelvis without contrast 3/17/2021  Review of lung windows demonstrates extensive reticular interstitial fibrosis in the lower lung zones, as well as loculated pleural fluid in the right lateral lung base with pleural thickening   10 mm.hyperattenuating exophytic nodule at the inferior pole the left kidney   7 mm nephrolithiasis at the inferior pole the left kidney.  The prostate gland is enlarged  There is grade 1 spondylolisthesis at L5-S1 with disc space collapse. This is stable     CT ABDOMEN PELVIS WO CONTRAST at [facility] on 11/16/2022:COMPARISON: 11/15/2021   There is mild cardiomegaly with mitral annulus calcifications and coronary artery calcification  Trace pericardial effusion or pericardial thickening is present.  There is interstitial fibrosis within the lung bases with honeycombing.   There is a loculated effusion within the right lateral costophrenic angle stable from the previous exam  There are cysts involving the left kidney.   More complex exophytic lesions involving the posterior midpole and the left lower pole may represent hemorrhagic cysts.  Anterolisthesis of L4 on L5 and L5 on S1 is present suggesting subluxation at the level of the facets at L4-L5  The prostate gland is mildly enlarged    CT ABDOMEN PELVIS WO on 11/9/23 at [facility], compared to 1/5/23  Pneumobilia, increased from prior. This may be seen following sphincterotomy or other hepatobiliary procedures. Recommend correlation with operative history and hepatic enzyme levels. No portal venous gas or bowel pneumatosis  Nonobstructing let nephrolithiasis and unchanged left renal cysts  Moderate quantity of stool throughout the colon    CT CHEST WO CONTRAST AT [facility] on 3/20/2024: Compared to 11/08/2023  1.3 cm partially calcified subcarinal lymph node, unchanged.   2.0 x 1.5 x 1.9 cm right upper lobe mass, previously 2.0 x 2.5 x 1.9 cm   1.8 x 3.0 x 2.0  right upper lobe mass, previously 3.5 x 1.9 x 1.6 cm   Emphysematous changes with subpleural reticulations and possible honeycombing and fibrosis are noted with pleural thickening again observed along the right lateral chest wall extending to the diaphragmatic surface.   There are scattered subpleural calcifications     CT ABDOMEN PELVIS WO CONTRAST AT [facility] on 3/20/2024: compared to 11/08/2023  No evidence of metastasis in the abdomen/pelvis.   Multiple left renal cysts. Nonobstructing 4 mm stone at the left lower pole   Mild colonic diverticulosis   No aggressive osseous lesion identified   Multilevel degenerative spondylosis and mild dextroconvex scoliosis. Grade 1 anterolisthesis at L4-L5 and L5-S1. Right unilateral L5 pars defect.      #1   HEMATOLOGICAL HISTORY: IgG kappa MGUS- Dx: 02/23/06.  During the course of Mr. Prater’s follow up, an abnormally elevated protein was noted on one occasion, which led to workup including quantitative immunoglobulins.     An elevated IgG kappa was encountered. This has remained stable in the 2500 range since early 2006.     A bone marrow biopsy and aspirate on 02/23/06 was negative with only 4% plasma cells.     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     24 hour urine for immunoelectrophoresis did not reveal an M-spike.  Serology studies on 9/18/2018 documented an elevated, stable IgG of 2589 with an M spike of 0.7.   Immunofixation continued to reveal IgG monoclonal protein with kappa light chain specificity.      #2   HEMATOLOGICAL HISTORY: Iron deficiency anemia, 9/18/2018  Karoline had serology on 9/18/2018 that identified iron deficiency anemia.   His lab work was obtained at [facility].  An iron level was 12, iron saturation 7%, ferritin 256  Folate >20, and vitamin B12 1044.   Dr. Li placed Karoline on ferrous sulfate 325 mg daily on 9/25/2018 , but this failed to resolve the anemia.    An EGD by Dr. Randy Somers on 12/11/2018 documented a normal  esophagus, normal stomach and a degree of duodenal deformity.  Gastric biopsy was urease negative.    Colonoscopy by Dr. Randy Somers on 1/9/2019 documented a polyp at 80 cm.  Pathology identified a tubular adenoma, negative for high-grade dysplasia.   Feraheme administered.    Labs on 3/13/2019:  Iron 25   Iron saturation 22%   TIBC 116   Ferritin >2000   Reticulocyte count 0.0578      Haptoglobin 341   Folate >20   Vitamin B12 945   Erythropoietin 13    He presented to [facility] on 4/26/2019 with melena and abdominal discomfort. He was subsequently admitted    EGD per Dr. Jj on 4/29/2019 revealed  LA grade (1 or more mucosal breaks greater than 5 mm, not extending between the tops of the 2 mucosal folds)?esophagitis with no bleeding found in the distal esophagus   Stomach was normal, biopsies for H. pylori taken.   Cardia and gastric fundus were normal on retroflexion   One nonbleeding cratered duodenal ulcer with no stigmata of bleeding was found in the duodenal bulb lesion was about 9 mm.   Many nonbleeding cratered, linear and superficial duodenal ulcers with no stigmata of bleeding were found in the second portion of the duodenum. The largest lesion was 6 mm in largest dimension. No mass encountered and anastomosis not seen.    He was admitted to [facility] on 5/8/2019 with melena, hematochezia, anemia, thrombocytopenia    Endoscopy performed by Dr. Bansal on 5/9/2019 revealed  Normal esophagus   Gastric mucosal variant. 2 erythematous spots in the antrum, ? AVM   Normal examined duodenum   Nothing found to account for symptoms   He developed pancytopenia from chemotherapy and did require transfusions.  His hemoglobin dropped to 7.3 on 6/25/2019 after his last treatment. He received 2 units packed red blood cells as an outpatient.    Serology 6/13/2019  Serum Fe - 117  TIBC - 587  Fe sat - 19.9%  Ferritin - 1,000  Iron levels have been adequately replaced. I'm rechecking some serology to consider administration  of Procrit related to a combination of stage III/IV CKD and chemotherapy related anemia.    TREATMENT SUMMARY  Feraheme 510 mg IV on 2/14 and 2/21/19   Venofer 200 mg IV daily 5/8 - 5/10/2019 as IP at [facility]   s/p 2 units pRBC on 4/26/19 and 2 units pRBC on 4/29/2019 as IP at [facility]?  s/p 2 units pRBC on?5/8/2019   s/p 2 pheresis plts on 5/8/2019  s/p 2 units pRBC as OP 6/26/2019               Past Medical History:    Past Medical History:   Diagnosis Date    Arthritis     Atrial fibrillation with rapid ventricular response 05/31/2019    Blindness of left eye     BPH with obstruction/lower urinary tract symptoms     Cancer     stomach & lung    CHF (congestive heart failure)     CKD (chronic kidney disease) stage 3, GFR 30-59 ml/min     Coronary artery disease     Elevated cholesterol     Essential hypertension 10/02/2017    Fibrotic lung diseases     GERD (gastroesophageal reflux disease)     Hearing loss     History of transfusion     Hydronephrosis of left kidney     Hyperlipidemia     Hypertension     pt was taken off of all of his medications for BP (atenolol, lisinopril, lasix) because his BP kept bottoming out so his primary dr told him to discontinue them 1-2 months ago (Jan/Feb 2019). pt states he takes no medications currently.    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 Patel is just going to keep an eye on it and not do surgery right now.    Mediastinal adenopathy 10/24/2018    Station [facility]    Monoclonal gammopathy of unknown significance (MGUS) 09/11/2018    Pancreatic mass     pt states he had this in 2013 but it went away on its own. Now recent CT shows it has come back so he is going to have an ultrasound on 3/13/19.    Shortness of breath        Past Surgical History:    Past Surgical History:   Procedure Laterality Date    ABDOMINAL SURGERY       BRONCHOSCOPY N/A 10/24/2018    Procedure: BRONCHOSCOPY WITH BIOPSY, EBUS;  Surgeon: Gareth Becerra MD;  Location: [facility] OR;  Service: Pulmonary    CHOLECYSTECTOMY      COLONOSCOPY N/A 1/3/2019    Procedure: COLONOSCOPY WITH ANESTHESIA;  Surgeon: Randy Somers DO;  Location: [facility] ENDOSCOPY;  Service: Gastroenterology    CYSTOSCOPY, RETROGRADE PYELOGRAM AND STENT INSERTION Left 3/8/2019    Procedure: CYSTOSCOPY RETROGRADE BILATERAL PYELOGRAM;  Surgeon: Jos Sylvester MD;  Location: [facility] OR;  Service: Urology    ENDOSCOPY N/A 12/11/2018    Procedure: ESOPHAGOGASTRODUODENOSCOPY WITH ANESTHESIA;  Surgeon: Randy Somers DO;  Location: [facility] ENDOSCOPY;  Service: Gastroenterology    ENDOSCOPY N/A 4/29/2019    Procedure: ESOPHAGOGASTRODUODENOSCOPY WITH ANESTHESIA;  Surgeon: Lilliam Jj MD;  Location: [facility] OR;  Service: Gastroenterology    ENDOSCOPY N/A 5/9/2019    Procedure: ESOPHAGOGASTRODUODENOSCOPY WITH ANESTHESIA;  Surgeon: Pilo Bansal MD;  Location: [facility] ENDOSCOPY;  Service: Gastroenterology    EYE SURGERY Left 1964    FOOT SURGERY Right 1966    joint    FRACTURE SURGERY         Current Hospital Medications:      Current Facility-Administered Medications:     acetaminophen (TYLENOL) tablet 650 mg, 650 mg, Oral, Q4H PRN **OR** acetaminophen (TYLENOL) 160 MG/5ML oral solution 650 mg, 650 mg, Oral, Q4H PRN **OR** acetaminophen (TYLENOL) suppository 650 mg, 650 mg, Rectal, Q4H PRN, Wilber Carbajal MD    acetaminophen (TYLENOL) tablet 1,000 mg, 1,000 mg, Oral, Nightly, Wilber Carbajal MD, 1,000 mg at 07/01/24 2120    allopurinol (ZYLOPRIM) tablet 100 mg, 100 mg, Oral, Daily, Wilber Carbajal MD, 100 mg at 07/02/24 0935    aspirin EC tablet 81 mg, 81 mg, Oral, Daily, Wilber Carbajal MD, 81 mg at 07/02/24 0935    atorvastatin (LIPITOR) tablet 20 mg, 20 mg, Oral, Nightly, Wilber Carbajal MD, 20 mg at 07/01/24 2120    sennosides-docusate  (PERICOLACE) 8.6-50 MG per tablet 2 tablet, 2 tablet, Oral, Daily **AND** polyethylene glycol (MIRALAX) packet 17 g, 17 g, Oral, Daily PRN **AND** bisacodyl (DULCOLAX) EC tablet 5 mg, 5 mg, Oral, Daily PRN **AND** bisacodyl (DULCOLAX) suppository 10 mg, 10 mg, Rectal, Daily PRN, Zachary Lloyd MD    Enoxaparin Sodium (LOVENOX) syringe 30 mg, 30 mg, Subcutaneous, Daily, Wilber Carbajal MD, 30 mg at 07/02/24 0935    fluticasone (FLONASE) 50 MCG/ACT nasal spray 1 spray, 1 spray, Nasal, Daily, Wilber Carbajal MD, 1 spray at 07/02/24 0935    isosorbide mononitrate (IMDUR) 24 hr tablet 30 mg, 30 mg, Oral, Q24H, Aristides Padilla APRN, 30 mg at 07/02/24 1239    meclizine (ANTIVERT) tablet 12.5 mg, 12.5 mg, Oral, TID PRN, Wilber Carbajal MD    melatonin tablet 10 mg, 10 mg, Oral, Nightly, Wilber Carbajal MD, 10 mg at 07/01/24 2120    metoprolol succinate XL (TOPROL-XL) 24 hr tablet 25 mg, 25 mg, Oral, Daily, Wilber Carbajal MD, 25 mg at 07/02/24 0936    montelukast (SINGULAIR) tablet 10 mg, 10 mg, Oral, Nightly, Wilber Carbajal MD, 10 mg at 07/01/24 2120    multivitamin with minerals 1 tablet, 1 tablet, Oral, Daily, Wilber Carbajal MD, 1 tablet at 07/02/24 0936    pantoprazole (PROTONIX) EC tablet 20 mg, 20 mg, Oral, Daily, Zachary Lloyd MD, 20 mg at 07/02/24 0936    predniSONE (DELTASONE) tablet 40 mg, 40 mg, Oral, Daily With Breakfast, Wilber Carbajal MD, 40 mg at 07/02/24 0935    sodium bicarbonate tablet 650 mg, 650 mg, Oral, TID, Wilber Carbajal MD, 650 mg at 07/02/24 1645    [COMPLETED] Insert Peripheral IV, , , Once **AND** sodium chloride 0.9 % flush 10 mL, 10 mL, Intravenous, PRN, Wilber Carbajal MD    sodium chloride 0.9 % flush 10 mL, 10 mL, Intravenous, Q12H, Wilber Carbajal MD, 10 mL at 07/02/24 0937    sodium chloride 0.9 % flush 10 mL, 10 mL, Intravenous, PRN, Wilber Carbajal MD    sodium chloride 0.9  % infusion 40 mL, 40 mL, Intravenous, PRN, Wilber Carbajal MD    sodium zirconium cyclosilicate (LOKELMA) packet 10 g, 10 g, Oral, BID, Zachary Lloyd MD, 10 g at 24 0937    tamsulosin (FLOMAX) 24 hr capsule 0.4 mg, 0.4 mg, Oral, Nightly, Wilber Carbajal MD, 0.4 mg at 24 2120    traMADol (ULTRAM) tablet 50 mg, 50 mg, Oral, Q6H PRN, Wilber Carbajal MD    Allergies:   Allergies   Allergen Reactions    Penicillins Hives       Social History:    Social History     Socioeconomic History    Marital status: Single   Tobacco Use    Smoking status: Former     Current packs/day: 0.00     Types: Cigarettes     Start date: 10/29/1954     Quit date: 10/29/2003     Years since quittin.6    Smokeless tobacco: Former     Types: Chew     Quit date:    Vaping Use    Vaping status: Never Used   Substance and Sexual Activity    Alcohol use: No    Drug use: No    Sexual activity: Defer       Family History:   Family History   Problem Relation Age of Onset    Hypertension Mother     Leukemia Father        REVIEW OF SYSTEMS:    Constitutional: no fever, no night sweats, no fatigue;   HEENT: Left eye blindness     Lungs: Episodic shortness of breath and right-sided pleuritic like chest pain where he requires oxygen about 3 to 4 AM several days in a row prior to his admission with resolution until on 2024, this did not resolve.  He came to the ED at [facility] for that.  CVS: no palpitation, no chest pain, no shortness of breath  GI: no abdominal pain, no nausea , no vomiting, no constipation  SHELBIE: no dysuria, frequency and urgency, no hematuria, no kidney stones  Musculoskeletal: no joint pain, swelling , stiffness  Endocrine: no polyuria, polydipsia, no cold or heat intolerance  Hematology: no anemia, no easy bruising or bleeding, no history of clotting disorder  Dermatology: no skin rash, no eczema, no pruritus  Psychiatry: no depression, no anxiety,no panic attacks, no suicide  "ideation  Neurology: no syncope, no seizures, no numbness or tingling of hands, no numbness or tingling of feet, no paresis    Vitals:    /84 (BP Location: Left arm, Patient Position: Lying)   Pulse 106   Temp 98 °F (36.7 °C) (Oral)   Resp 16   Ht 162.6 cm (64\")   Wt 75.5 kg (166 lb 7.2 oz)   SpO2 92%   BMI 28.57 kg/m²     PHYSICAL EXAM:    CONSTITUTIONAL: Alert, appropriate, no acute distress  EYES: Anicteric, left eye blindness  ENT: Mucous membranes moist, no oropharyngeal lesions   NECK: Supple, no masses   CHEST/LUNGS: Chronic appearing rales and crackles    CARDIOVASCULAR: RRR, no murmurs  ABDOMEN: soft nontender, active bowel sounds, no HSM  EXTREMITIES: warm, moves all fours  SKIN: warm, dry with no rashes or lesions  LYMPH: No cervical, clavicular, axillary, or inguinal lymphadenopathy  NEUROLOGIC: follows commands, nonfocal   PSYCH: mood and affect appropriate    CBC  Results from last 7 days   Lab Units 07/02/24  0109 06/30/24  0417 06/29/24  1027   WBC 10*3/mm3 8.67 5.66 7.39   HEMOGLOBIN g/dL 13.9 12.7* 13.8   HEMATOCRIT % 43.4 39.6 44.0   PLATELETS 10*3/mm3 187 166 181         Lab Results   Component Value Date     07/02/2024    K 5.8 (H) 07/02/2024     07/02/2024    CO2 21.0 (L) 07/02/2024    BUN 45 (H) 07/02/2024    CREATININE 2.05 (H) 07/02/2024    GLUCOSE 136 (H) 07/02/2024    CALCIUM 10.2 07/02/2024    BILITOT 0.3 07/02/2024    ALKPHOS 113 07/02/2024    AST 24 07/02/2024    ALT 21 07/02/2024    AGRATIO 1.1 07/02/2024    GLOB 3.4 07/02/2024       Lab Results   Component Value Date    INR 1.04 06/29/2024    INR 1.29 (H) 01/05/2023    INR 1.18 (H) 08/25/2021    PROTIME 14.1 06/29/2024    PROTIME 16.2 (H) 01/05/2023    PROTIME 14.1 (H) 08/25/2021       Cultures:    Lab Results   Component Value Date    BLOODCX No growth at 3 days 06/29/2024     No components found for: \"URINCX\"    ASSESSMENT/PLAN:    Karoline Prater is an 84-year-old  gentleman managed in [facility]" oncology office with 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     He has a remote history of a pancreatic mass identified and biopsied by Dr. Alexis on 10/29/03.  Pathology negative on open biopsy.    Fahad completed 4 cycles of combination chemotherapy with carboplatin, Keytruda, Alimta on 7/5/2019.   Maintenance Keytruda and Alimta every 3 weeks was delivered.   The final cycle number #25 of Keytruda/Alimta was delivered on 10/29/2020.  Harry has been on a chemotherapy holiday since October 2020 due to issues of dyspnea and shortness of breath, requiring episodic steroids.     Fahad continues to have chronic dyspnea on exertion associated with pulmonary fibrosis from smoking history as well as drilling and blasting rock at the [employer], but still at baseline without exacerbations.    Imaging studies with CT scan of the chest on 11/9/2023 and a follow-up PET scan on 12/7/2023 documented localized progressive disease in the RUL of the lung.  He was referred to Dr. Danny Cool at [facility] for SBRT.    SBRT by Dr. Danny Cool was delivered in 5 treatment fractions for a total dose of 5000 cGy. XRT was initiated 1/24/2024 and was completed on 2/7/2024    Imaging were performed on 3/20/2024 to assess response to treatment and restaging on a chemotherapy.    CT CHEST WO CONTRAST AT [facility] on 3/20/2024: Compared to 11/08/2023  1.3 cm partially calcified subcarinal lymph node, unchanged.   2.0 x 1.5 x 1.9 cm right upper lobe mass, previously 2.0 x 2.5 x 1.9 cm   1.8 x 3.0 x 2.0 right upper lobe mass, previously 3.5 x 1.9 x 1.6 cm   Emphysematous changes with subpleural reticulations and possible honeycombing and fibrosis are noted with pleural thickening again observed along the right lateral chest wall extending to the diaphragmatic surface.   There are scattered subpleural calcifications     CT ABDOMEN PELVIS WO CONTRAST AT [facility] on 3/20/2024: compared to  2023  No evidence of metastasis in the abdomen/pelvis.     Mr. Prater was admitted on 2024 with chest pain on exertion elevated troponin level for cardiac evaluation and management.  In workup, imaging studies have been performed and medical oncology consultation requested.    CT scan of the chest without contrast at [facility] on 2024 was compared to 2024 reported the following  1. A spiculated mass in the right upper lobe laterally is larger on the   current study.   2. There is consolidation around a previously described nodule in the   right lung apex likely related to posttreatment change.   3. Diffuse reticulonodular fibrotic changes throughout both lungs.   Emphysema.   4. Pleural thickening on the right that is more focal in the right lung   base laterally. Metastatic pleural disease is considered.   5. Ectasia of the ascending thoracic aorta measuring 4.3 cm. Main   pulmonary artery segment is dilated measuring 3.6 cm. Mild cardiomegaly. ...     CT scan of the abdomen pelvis at [facility] on 2024 was compared to 2023 and reported the followin. There is a mildly dilated proximal small bowel loop measuring 4 cm.  No other small bowel distention is seen. This could be transient or due  to partial obstruction. Gastric wall thickening likely related to under  distention. There is under distention of portions of the colon and  moderate stool in the colon.  2. 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  Fibrosis in the lung bases with emphysema.  3. Left renal cyst. A 6 mm nonobstructive renal stone lower pole on the  left.  4. Mildly enlarged prostate measuring 4.7 cm.  5. Air within the biliary tree likely related to prior sphincterotomy.  Prior cholecystectomy.  6. Coarsening of the trabecula in the right femoral neck and  trochanteric region could be related to early Paget's disease. There is  no cortical thickening. A lipo sclerosing myxofibrous tumor is felt  to  be less likely as there is no sclerotic margin.    Definitive XRT with SBRT by Dr. Danny Cool was delivered in 5 treatment fractions for a total dose of 5000 cGy. XRT was initiated 1/24/2024 and was completed on 2/7/2024.     I will discuss imaging studies with Dr. Cool, but in my estimation the findings in the RUL on the CT scan from 6/29/2024 probably represents the area previously radiated and can be followed conservatively.  If after review with Dr. Cool, there are any questions, a PET scan can be done for comparative purposes as an outpatient.        Jeff Michele MD    07/02/24  18:15 CDT

## 2024-07-02 NOTE — PLAN OF CARE
Goal Outcome Evaluation:  Plan of Care Reviewed With: patient           Outcome Evaluation: Physical therapy evaluation performed. Pt alert and oriented x 4, 2LNC off and beside pt in bed, reports taking off around 8AM. He reports using O2 PRN. 91% on room air 115 bpm, after ambulating in hallway, pt was short of breath and O2 90%  bpm. Skilled PT needed to work on endurance, balance and gait. He needed CGA for gait in hallway and verbal cues for gait. Pt would benefit from HH upon d/c.      Anticipated Discharge Disposition (PT): home with home health

## 2024-07-02 NOTE — PLAN OF CARE
Goal Outcome Evaluation:  Plan of Care Reviewed With: patient        Progress: no change  Outcome Evaluation: Patient A&OX4, VSS on RA; PRN 2L NC, SR/ST . No complaints of pain.

## 2024-07-02 NOTE — PROGRESS NOTES
[facility] Medicine Services  INPATIENT PROGRESS NOTE    Patient Name: Karoline Prater  Date of Admission: 6/29/2024  Today's Date: 07/02/24  Length of Stay: 3  Primary Care Physician: Irving Alexis MD    Subjective   Chief Complaint: Renal failure/intermediate stress test  HPI   Creatinine slight improvement.  Potassium is high, start Lokelma.  Intermediate stress test discussed with patient, consult cardiology.  CT result discussed with patient again possible increase of right upper lobe mass.  White blood cells normal.  Hemoglobin is normal.  Patient denies any chest pain today.  Patient is currently on room air.    Review of Systems   Constitutional:  Positive for activity change, appetite change and fatigue. Negative for chills and fever.   HENT:  Negative for hearing loss, nosebleeds, tinnitus and trouble swallowing.    Eyes:  Negative for visual disturbance.   Respiratory:  Positive for shortness of breath. Negative for cough, chest tightness and wheezing.    Cardiovascular:  Negative for chest pain, palpitations and leg swelling.   Gastrointestinal:  Negative for abdominal distention, abdominal pain, blood in stool, constipation, diarrhea, nausea and vomiting.   Endocrine: Negative for cold intolerance, heat intolerance, polydipsia, polyphagia and polyuria.   Genitourinary:  Negative for decreased urine volume, difficulty urinating, dysuria, flank pain, frequency and hematuria.   Musculoskeletal:  Positive for arthralgias, gait problem and myalgias. Negative for joint swelling.   Skin:  Negative for rash.   Allergic/Immunologic: Negative for immunocompromised state.   Neurological:  Positive for weakness. Negative for dizziness, syncope, light-headedness and headaches.   Hematological:  Negative for adenopathy. Does not bruise/bleed easily.   Psychiatric/Behavioral:  Negative for confusion and sleep disturbance. The patient is not nervous/anxious.    All  pertinent negatives and positives are as above. All other systems have been reviewed and are negative unless otherwise stated.     Objective    Temp:  [97.4 °F (36.3 °C)-98.2 °F (36.8 °C)] 98 °F (36.7 °C)  Heart Rate:  [80-97] 80  Resp:  [16-18] 16  BP: ()/(45-88) 124/75  Physical Exam  Vitals and nursing note reviewed.   Constitutional:       Comments: Advanced age.  Chronically ill.   HENT:      Head: Normocephalic and atraumatic.   Eyes:      Conjunctiva/sclera: Conjunctivae normal.      Pupils: Pupils are equal, round, and reactive to light.      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   Cardiovascular:      Rate and Rhythm: Normal rate and regular rhythm.      Heart sounds: Normal heart sounds.   Pulmonary:      Effort: Pulmonary effort is normal. No respiratory distress.      Breath sounds: Normal breath sounds.   Abdominal:      General: Bowel sounds are normal. There is no distension.      Palpations: Abdomen is soft.      Tenderness: There is no abdominal tenderness.   Musculoskeletal:         General: No swelling.      Cervical back: Neck supple.   Skin:     General: Skin is warm and dry.      Capillary Refill: Capillary refill takes 2 to 3 seconds.      Findings: No rash.   Neurological:      General: No focal deficit present.      Mental Status: He is alert and oriented to person, place, and time.      Motor: Weakness present.      Coordination: Coordination abnormal.      Gait: Gait abnormal.   Psychiatric:         Mood and Affect: Mood normal.         Behavior: Behavior normal.         Thought Content: Thought content normal.          Results Review:  I have reviewed the labs, radiology results, and diagnostic studies.    Laboratory Data:   Results from last 7 days   Lab Units 07/02/24  0109 06/30/24  0417 06/29/24  1027   WBC 10*3/mm3 8.67 5.66 7.39   HEMOGLOBIN g/dL 13.9 12.7* 13.8   HEMATOCRIT % 43.4 39.6 44.0   PLATELETS 10*3/mm3 187 166 181        Results from last 7 days   Lab Units 07/02/24  0109  06/30/24  0417 06/29/24  1027   SODIUM mmol/L 137 136 137   POTASSIUM mmol/L 5.8* 4.9 4.9   CHLORIDE mmol/L 106 105 103   CO2 mmol/L 21.0* 22.0 23.0   BUN mg/dL 45* 55* 61*   CREATININE mg/dL 2.05* 2.47* 2.77*   CALCIUM mg/dL 10.2 9.8 10.4   BILIRUBIN mg/dL 0.3  --  0.4   ALK PHOS U/L 113  --  119*   ALT (SGPT) U/L 21  --  21   AST (SGOT) U/L 24  --  22   GLUCOSE mg/dL 136* 83 94       Culture Data:   Blood Culture   Date Value Ref Range Status   06/29/2024 No growth at 2 days  Preliminary   06/29/2024 No growth at 2 days  Preliminary       Radiology Data:   Imaging Results (Last 24 Hours)       Procedure Component Value Units Date/Time    US Venous Doppler Lower Extremity Bilateral (duplex) [[id number]] Collected: 07/01/24 1325     Updated: 07/01/24 1328    Narrative:      History: Swelling       Impression:      Impression: There is no evidence of deep venous thrombosis or  superficial thrombophlebitis of right or left lower extremities.     Comments: Bilateral lower extremity venous duplex exam was performed  using color Doppler flow, Doppler waveform analysis, and grayscale  imaging, with and without compression. There is no evidence of deep  venous thrombosis in the common femoral, superficial femoral, popliteal,  peroneal, anterior tibial, and posterior tibial veins bilaterally. No  thrombus is identified in the saphenofemoral junctions and greater  saphenous veins bilaterally.            This report was signed and finalized on 7/1/2024 1:25 PM by Dr. Sidney Connors MD.               I have reviewed the patient's current medications.     Assessment/Plan   Assessment  Active Hospital Problems    Diagnosis     **Chest pain     Former smoker     GERD without esophagitis     Acute renal failure superimposed on chronic kidney disease     Fibrotic lung diseases     Stage 3 chronic kidney disease     Adenocarcinoma, lung     Malignant neoplasm of upper lobe of right lung     Dyspnea     Essential hypertension         Treatment Plan  Chest pain/hypertension/hyperlipidemia/CAD.  Aspirin.  Lipitor.  Toprol .  Patient denies any chest pain today.  Troponin trending down.  Stress echo-intermediate finding.  Consult cardiology.     Stage IV lung cancer/adenocarcinoma.  Radiation treatment.  CT of the chest-spiculated mass in the right upper lobe laterally is larger on the  current study, consolidation around a previously described nodule in the  right lung apex likely related to posttreatment change, Diffuse reticulonodular fibrotic changes throughout both lungs, Emphysema, Pleural thickening on the right that is more focal in the right lung base laterall,. Metastatic pleural disease is considered, Ectasia of the ascending thoracic aorta measuring 4.3 cm- Main pulmonary artery segment is dilated measuring 3.6 cm- Mild cardiomegaly, Mediastinal lymph nodes appear stable- Multiple lymph nodes are calcified.  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 ...      Constipation . Constipation protocol.     Shortness of breath/COPD/emphysema/fibrosis of the lung base.  Patient is on chronic 2 L of oxygen at home.  Singulair.  Cut back prednisone.  VQ scan- Low probability of acute pulmonary embolism. .   Doppler ultrasound lower [id number] thrombus vis in BLE.   Patient is currently on room air.     Nonobstructive kidney stone.     Acute on chronic stage III renal failure.  Previous creatinine 1/7/2023 2.05.  Slow IV hydration.  Creatinine  improving.    Hyperkalemia.  Lokelma.    Hyperglycemia.  Hemoglobin A1c 6.5.     Gout.  Allopurinol.     Allergic rhinitis.  Flonase.     Reflux . Protonix.  Zofran as needed.     Pain . Ultram as needed.     Prostate hypertrophy.  Flomax.     Insomnia/anxiety.  Melatonin nightly.  Antivert as needed.     Lovenox prophylaxis, renal dosing.     Advanced age.  84 years old.     Nutrition.  Multivitamins.  Cardiac diet.     Blood cultures pending-no growth in 24 hours.     Deconditioning.  PT and OT consult.     Patient lives at home independently.  Patient gets around with a cane/walker as needed.     Medical Decision Making  Number and Complexity of problems: Chest pain.  Emphysema/COPD/fibrosis lung base/advanced age  Differential Diagnosis: None     Conditions and Status        Condition is unchanged.     MDM Data  External documents reviewed: Previous note.  Cardiac tracing (EKG, telemetry) interpretation: Sinus .  Radiology interpretation: CT scan  Labs reviewed: Laboratory  Any tests that were considered but not ordered: Lab in a.m.     Decision rules/scores evaluated (example [id number], Wells, etc): None     Discussed with: Patient     Care Planning  Shared decision making: Patient  Code status and discussions: Full code     Disposition  Social Determinants of Health that impact treatment or disposition: From home  1 to 2 days.    Electronically signed by Zachary Lloyd MD, 07/02/24, 09:04 CDT.

## 2024-07-02 NOTE — CONSULTS
"[facility] HEART GROUP CONSULT NOTE    Referring Provider: Wilber Carbajal,*    Reason for Consultation: Intermediate Stress     Chief complaint:   Chief Complaint   Patient presents with    Shortness of Breath    Difficulty Urinating       Subjective .     History of present illness:  Karoline Prater is a 84 y.o. male who has been admitted since 6/29. He notes that he was experiencing bilateral hip pain that radiated up to his back. He notes this started while mowing his yard. He notes that he then experienced right sided \"severe lung Pain\". He notes this pain was sharp and worse on a deep breath. He notes he has not had any more pain since admission. He denies any chest pain. He does report shortness of breath- worse on exertion- but he notes this is chronic. He notes that he has been having issues with ambulating secondary to hip pain. He was admitted with [place]. Troponin was elevated with a flat trend of 116,125,102. Patient had a CT of chest without contrast which showed RUL mass- noted to be larger with consolidation and pleural thickened. Also noted with ectasia. Overall patient is stable. No complaints today. He had a stress echo which was intermediate risk and therefore we were consulted. Patient is followed by [facility]. Patient had V-Tach during procedure in 2019-had a heart cath at that time with mild coronary artery disease- with diffuse disease of small circ. Patient has Stage IV metastatic lung disease followed by Dr. Michele and Dr. Cool, chronic kidney disease, hypertension, hyperlipidemia, GERD, fibrotic lung disease and mild coronary artery disease.     History  Past Medical History:   Diagnosis Date    Arthritis     Atrial fibrillation with rapid ventricular response 05/31/2019    Blindness of left eye     BPH with obstruction/lower urinary tract symptoms     Cancer     stomach & lung    CHF (congestive heart failure)     CKD (chronic kidney disease) stage 3, GFR " 30-59 ml/min     Coronary artery disease     Elevated cholesterol     Essential hypertension 10/02/2017    Fibrotic lung diseases     GERD (gastroesophageal reflux disease)     Hearing loss     History of transfusion     Hydronephrosis of left kidney     Hyperlipidemia     Hypertension     pt was taken off of all of his medications for BP (atenolol, lisinopril, lasix) because his BP kept bottoming out so his primary dr told him to discontinue them 1-2 months ago (Jan/Feb 2019). pt states he takes no medications currently.    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 Patel is just going to keep an eye on it and not do surgery right now.    Mediastinal adenopathy 10/24/2018    Station 7    Monoclonal gammopathy of unknown significance (MGUS) 09/11/2018    Pancreatic mass     pt states he had this in 2013 but it went away on its own. Now recent CT shows it has come back so he is going to have an ultrasound on 3/13/19.    Shortness of breath    ,   Past Surgical History:   Procedure Laterality Date    ABDOMINAL SURGERY      BRONCHOSCOPY N/A 10/24/2018    Procedure: BRONCHOSCOPY WITH BIOPSY, EBUS;  Surgeon: Gareth Becerra MD;  Location: [facility] OR;  Service: Pulmonary    CHOLECYSTECTOMY      COLONOSCOPY N/A 1/3/2019    Procedure: COLONOSCOPY WITH ANESTHESIA;  Surgeon: Randy Somers DO;  Location: [facility] ENDOSCOPY;  Service: Gastroenterology    CYSTOSCOPY, RETROGRADE PYELOGRAM AND STENT INSERTION Left 3/8/2019    Procedure: CYSTOSCOPY RETROGRADE BILATERAL PYELOGRAM;  Surgeon: Jos Sylvester MD;  Location: [facility] OR;  Service: Urology    ENDOSCOPY N/A 12/11/2018    Procedure: ESOPHAGOGASTRODUODENOSCOPY WITH ANESTHESIA;  Surgeon: Randy Somers DO;  Location: [facility] ENDOSCOPY;  Service: Gastroenterology    ENDOSCOPY N/A 4/29/2019    Procedure:  ESOPHAGOGASTRODUODENOSCOPY WITH ANESTHESIA;  Surgeon: Lilliam Jj MD;  Location: [facility] OR;  Service: Gastroenterology    ENDOSCOPY N/A 2019    Procedure: ESOPHAGOGASTRODUODENOSCOPY WITH ANESTHESIA;  Surgeon: Pilo Bansal MD;  Location: [facility] ENDOSCOPY;  Service: Gastroenterology    EYE SURGERY Left 1964    FOOT SURGERY Right 1966    joint    FRACTURE SURGERY     ,   Family History   Problem Relation Age of Onset    Hypertension Mother     Leukemia Father    ,   Social History     Tobacco Use    Smoking status: Former     Current packs/day: 0.00     Types: Cigarettes     Start date: 10/29/1954     Quit date: 10/29/2003     Years since quittin.6    Smokeless tobacco: Former     Types: Chew     Quit date:    Vaping Use    Vaping status: Never Used   Substance Use Topics    Alcohol use: No    Drug use: No   ,     Medications    Prior to Admission medications    Medication Sig Start Date End Date Taking? Authorizing Provider   acetaminophen (TYLENOL) 500 MG tablet Take 2 tablets by mouth Every Night.   Yes Eliecer Schultz MD   allopurinol (ZYLOPRIM) 100 MG tablet Take 1 tablet by mouth Daily.   Yes Eliecer Schultz MD   fluticasone (FLONASE) 50 MCG/ACT nasal spray 1 spray into the nostril(s) as directed by provider Daily.   Yes Eliecer Schultz MD   furosemide (LASIX) 20 MG tablet Take 1 tablet by mouth 2 (Two) Times a Day.   Yes Eliecer Schultz MD   losartan (COZAAR) 50 MG tablet Take 1 tablet by mouth Daily.   Yes Eliecer Schultz MD   melatonin 5 MG tablet tablet Take 2 tablets by mouth Every Night.   Yes Eliecer Schultz MD   metoprolol succinate XL (TOPROL-XL) 25 MG 24 hr tablet Take 1 tablet by mouth Daily.   Yes Eliecer Schultz MD   montelukast (SINGULAIR) 10 MG tablet Take 1 tablet by mouth Every Night.   Yes Eliecer Schultz MD   multivitamin with minerals tablet tablet Take 1 tablet by mouth Daily.   Yes Eliecer Schultz MD   pantoprazole  (PROTONIX) 40 MG EC tablet Take 1 tablet by mouth Daily. 4/30/19  Yes Hang Reddy DO   sodium bicarbonate 650 MG tablet Take 1 tablet by mouth 3 (Three) Times a Day.   Yes Eliecer Schultz MD   spironolactone (ALDACTONE) 25 MG tablet Take 1 tablet by mouth Daily.   Yes Eliecer Schultz MD   tamsulosin (FLOMAX) 0.4 MG capsule 24 hr capsule Take 1 capsule by mouth Every Night.   Yes Eliecer Schultz MD   meclizine (ANTIVERT) 12.5 MG tablet Take 1 tablet by mouth 3 (Three) Times a Day As Needed for Dizziness.    Eliecer Schultz MD       Current Facility-Administered Medications   Medication Dose Route Frequency Provider Last Rate Last Admin    acetaminophen (TYLENOL) tablet 650 mg  650 mg Oral Q4H PRN Wilber Carbajal MD        Or    acetaminophen (TYLENOL) 160 MG/5ML oral solution 650 mg  650 mg Oral Q4H PRN Wilber Carbajal MD        Or    acetaminophen (TYLENOL) suppository 650 mg  650 mg Rectal Q4H PRN Wilber Carbajal MD        acetaminophen (TYLENOL) tablet 1,000 mg  1,000 mg Oral Nightly Wilber Carbajal MD   1,000 mg at 07/01/24 2120    allopurinol (ZYLOPRIM) tablet 100 mg  100 mg Oral Daily Wilber Carbajal MD   100 mg at 07/02/24 0935    aspirin EC tablet 81 mg  81 mg Oral Daily Wilber Carbajal MD   81 mg at 07/02/24 0935    atorvastatin (LIPITOR) tablet 20 mg  20 mg Oral Nightly Wilber Carbajal MD   20 mg at 07/01/24 2120    atropine sulfate injection 2 mg  2 mg Intravenous Once Casa Gordon DO        sennosides-docusate (PERICOLACE) 8.6-50 MG per tablet 2 tablet  2 tablet Oral Daily Zachary Lloyd MD        And    polyethylene glycol (MIRALAX) packet 17 g  17 g Oral Daily PRN Zachary Lloyd MD        And    bisacodyl (DULCOLAX) EC tablet 5 mg  5 mg Oral Daily PRN Zachary Lloyd MD        And    bisacodyl (DULCOLAX) suppository 10 mg  10 mg Rectal Daily PRN Zachary Lloyd MD        Enoxaparin Sodium  (LOVENOX) syringe 30 mg  30 mg Subcutaneous Daily Wilber Carbajal MD   30 mg at 07/02/24 0935    fluticasone (FLONASE) 50 MCG/ACT nasal spray 1 spray  1 spray Nasal Daily Wilber Carbajal MD   1 spray at 07/02/24 0935    isosorbide mononitrate (IMDUR) 24 hr tablet 30 mg  30 mg Oral Q24H Aristides Padilla APRN        meclizine (ANTIVERT) tablet 12.5 mg  12.5 mg Oral TID PRN Wilber Carbajal MD        melatonin tablet 10 mg  10 mg Oral Nightly Wilber Carbajal MD   10 mg at 07/01/24 2120    metoprolol succinate XL (TOPROL-XL) 24 hr tablet 25 mg  25 mg Oral Daily Wilber Carbajal MD   25 mg at 07/02/24 0936    metoprolol tartrate (LOPRESSOR) injection 5 mg  5 mg Intravenous Once Casa Gordon DO        montelukast (SINGULAIR) tablet 10 mg  10 mg Oral Nightly Wilber Carbajal MD   10 mg at 07/01/24 2120    multivitamin with minerals 1 tablet  1 tablet Oral Daily Wilber Carbajal MD   1 tablet at 07/02/24 0936    pantoprazole (PROTONIX) EC tablet 20 mg  20 mg Oral Daily Zachary Lloyd MD   20 mg at 07/02/24 0936    predniSONE (DELTASONE) tablet 40 mg  40 mg Oral Daily With Breakfast Wilber Carbajal MD   40 mg at 07/02/24 0935    sodium bicarbonate tablet 650 mg  650 mg Oral TID Wilber Carbajal MD   650 mg at 07/02/24 0936    sodium chloride 0.9 % flush 10 mL  10 mL Intravenous PRN Wilber Carbajal MD        sodium chloride 0.9 % flush 10 mL  10 mL Intravenous Q12H Wilber Carbajal MD   10 mL at 07/02/24 0937    sodium chloride 0.9 % flush 10 mL  10 mL Intravenous PRN Wilber Carbajal MD        sodium chloride 0.9 % infusion 40 mL  40 mL Intravenous PRN Wilber Carbajal MD        sodium zirconium cyclosilicate (LOKELMA) packet 10 g  10 g Oral BID Zachary Lloyd MD   10 g at 07/02/24 0937    tamsulosin (FLOMAX) 24 hr capsule 0.4 mg  0.4 mg Oral Nightly Wilber Carbajal MD   0.4 mg at 07/01/24  2120    traMADol (ULTRAM) tablet 50 mg  50 mg Oral Q6H PRN Wilber Carbajal MD           Allergies:  Penicillins    Review of Systems  Review of Systems   Constitutional: Positive for malaise/fatigue. Negative for chills, decreased appetite, fever, weight gain and weight loss.   HENT:  Negative for nosebleeds.    Eyes:  Negative for visual disturbance.   Cardiovascular:  Positive for chest pain (right posterior pain). Negative for dyspnea on exertion, leg swelling, near-syncope, orthopnea, palpitations, paroxysmal nocturnal dyspnea and syncope.   Respiratory:  Positive for shortness of breath. Negative for cough, hemoptysis and snoring.    Endocrine: Negative for cold intolerance and heat intolerance.   Hematologic/Lymphatic: Negative for bleeding problem. Does not bruise/bleed easily.   Skin:  Negative for rash.   Musculoskeletal:  Positive for joint pain. Negative for back pain and falls.   Gastrointestinal:  Negative for abdominal pain, constipation, diarrhea, heartburn, melena, nausea and vomiting.   Genitourinary:  Negative for hematuria.   Neurological:  Negative for dizziness, headaches and light-headedness.   Psychiatric/Behavioral:  Negative for altered mental status.    Allergic/Immunologic: Negative for persistent infections.       Objective     Physical Exam:  Patient Vitals for the past 24 hrs:   BP Temp Temp src Pulse Resp SpO2   07/02/24 0726 124/75 98 °F (36.7 °C) Oral 80 16 97 %   07/02/24 0419 -- 97.9 °F (36.6 °C) -- -- -- --   07/02/24 0359 131/82 -- -- -- -- --   07/02/24 0350 (!) 84/45 -- Oral 83 18 92 %   07/01/24 2327 138/75 97.4 °F (36.3 °C) Oral 80 18 94 %   07/01/24 1932 151/88 97.5 °F (36.4 °C) Oral 86 18 96 %   07/01/24 1648 147/88 98.2 °F (36.8 °C) Oral 89 16 93 %     Constitutional:       Appearance: Well-developed. Frail. Chronically ill-appearing.   Eyes:      Pupils: Pupils are equal, round, and reactive to light.   HENT:      Head: Normocephalic and atraumatic.   Neck:       Vascular: No carotid bruit or JVD.   Pulmonary:      Effort: Pulmonary effort is normal.      Breath sounds: Normal breath sounds.   Cardiovascular:      Normal rate. Regular rhythm.      Murmurs: There is no murmur.   Pulses:     Intact distal pulses.   Edema:     Peripheral edema absent.   Abdominal:      General: Bowel sounds are normal.      Palpations: Abdomen is soft.   Musculoskeletal: Normal range of motion.      Cervical back: Normal range of motion and neck supple. Skin:     General: Skin is warm and dry.   Neurological:      Mental Status: Alert and oriented to person, place, and time.      Deep Tendon Reflexes: Reflexes are normal and symmetric.   Psychiatric:         Behavior: Behavior normal.         Thought Content: Thought content normal.         Judgment: Judgment normal.         Results Review:   I reviewed the patient's new clinical results.    Lab Results (last 72 hours)       Procedure Component Value Units Date/Time    Comprehensive Metabolic Panel [[id number]]  (Abnormal) Collected: 07/02/24 0109    Specimen: Blood Updated: 07/02/24 0149     Glucose 136 mg/dL      BUN 45 mg/dL      Creatinine 2.05 mg/dL      Sodium 137 mmol/L      Potassium 5.8 mmol/L      Chloride 106 mmol/L      CO2 21.0 mmol/L      Calcium 10.2 mg/dL      Total Protein 7.0 g/dL      Albumin 3.6 g/dL      ALT (SGPT) 21 U/L      AST (SGOT) 24 U/L      Alkaline Phosphatase 113 U/L      Total Bilirubin 0.3 mg/dL      Globulin 3.4 gm/dL      A/G Ratio 1.1 g/dL      BUN/Creatinine Ratio 22.0     Anion Gap 10.0 mmol/L      eGFR 31.8 mL/min/1.73     Narrative:      GFR Normal >60  Chronic Kidney Disease <60  Kidney Failure <15    The GFR formula is only valid for adults with stable renal function between ages 18 and 70.    Lipid Panel [[id number]]  (Abnormal) Collected: 07/02/24 0109    Specimen: Blood Updated: 07/02/24 0149     Total Cholesterol 115 mg/dL      Triglycerides 143 mg/dL      HDL Cholesterol 35 mg/dL      LDL  Cholesterol  55 mg/dL      VLDL Cholesterol 25 mg/dL      LDL/HDL Ratio 1.47    Narrative:      Cholesterol Reference Ranges  (U.S. Department of Health and Human Services ATP III Classifications)    Desirable          <200 mg/dL  Borderline High    200-239 mg/dL  High Risk          >240 mg/dL      Triglyceride Reference Ranges  (U.S. Department of Health and Human Services ATP III Classifications)    Normal           <150 mg/dL  Borderline High  150-199 mg/dL  High             200-499 mg/dL  Very High        >500 mg/dL    HDL Reference Ranges  (U.S. Department of Health and Human Services ATP III Classifications)    Low     <40 mg/dl (major risk factor for CHD)  High    >60 mg/dl ('negative' risk factor for CHD)        LDL Reference Ranges  (U.S. Department of Health and Human Services ATP III Classifications)    Optimal          <100 mg/dL  Near Optimal     100-129 mg/dL  Borderline High  130-159 mg/dL  High             160-189 mg/dL  Very High        >189 mg/dL    CBC & Differential [[id number]]  (Abnormal) Collected: 07/02/24 0109    Specimen: Blood Updated: 07/02/24 0144    Narrative:      The following orders were created for panel order CBC & Differential.  Procedure                               Abnormality         Status                     ---------                               -----------         ------                     CBC Auto Differential[[id number]]        Abnormal            Final result                 Please view results for these tests on the individual orders.    CBC Auto Differential [[id number]]  (Abnormal) Collected: 07/02/24 0109    Specimen: Blood Updated: 07/02/24 0144     WBC 8.67 10*3/mm3      RBC 4.57 10*6/mm3      Hemoglobin 13.9 g/dL      Hematocrit 43.4 %      MCV 95.0 fL      MCH 30.4 pg      MCHC 32.0 g/dL      RDW 16.9 %      RDW-SD 58.4 fl      MPV 11.6 fL      Platelets 187 10*3/mm3      Neutrophil % 89.4 %      Lymphocyte % 4.8 %      Monocyte % 4.0 %      Eosinophil % 0.0 %       Basophil % 0.1 %      Immature Grans % 1.7 %      Neutrophils, Absolute 7.74 10*3/mm3      Lymphocytes, Absolute 0.42 10*3/mm3      Monocytes, Absolute 0.35 10*3/mm3      Eosinophils, Absolute 0.00 10*3/mm3      Basophils, Absolute 0.01 10*3/mm3      Immature Grans, Absolute 0.15 10*3/mm3      nRBC 0.0 /100 WBC     Hemoglobin A1c [[id number]]  (Abnormal) Collected: 07/02/24 0109    Specimen: Blood Updated: 07/02/24 0133     Hemoglobin A1C 6.50 %     Narrative:      Hemoglobin A1C Ranges:    Increased Risk for Diabetes  5.7% to 6.4%  Diabetes                     >= 6.5%  Diabetic Goal                < 7.0%    Blood Culture - Blood, Arm, Right [[id number]]  (Normal) Collected: 06/29/24 1105    Specimen: Blood from Arm, Right Updated: 07/01/24 1130     Blood Culture No growth at 2 days    Blood Culture - Blood, Arm, Right [[id number]]  (Normal) Collected: 06/29/24 1027    Specimen: Blood from Arm, Right Updated: 07/01/24 1130     Blood Culture No growth at 2 days    High Sensitivity Troponin T [[id number]]  (Abnormal) Collected: 06/30/24 0417    Specimen: Blood Updated: 06/30/24 0537     HS Troponin T 107 ng/L     Narrative:      High Sensitive Troponin T Reference Range:  <14.0 ng/L- Negative Female for AMI  <22.0 ng/L- Negative Male for AMI  >=14 - Abnormal Female indicating possible myocardial injury.  >=22 - Abnormal Male indicating possible myocardial injury.   Clinicians would have to utilize clinical acumen, EKG, Troponin, and serial changes to determine if it is an Acute Myocardial Infarction or myocardial injury due to an underlying chronic condition.         Basic Metabolic Panel [[id number]]  (Abnormal) Collected: 06/30/24 0417    Specimen: Blood Updated: 06/30/24 0521     Glucose 83 mg/dL      BUN 55 mg/dL      Creatinine 2.47 mg/dL      Sodium 136 mmol/L      Potassium 4.9 mmol/L      Chloride 105 mmol/L      CO2 22.0 mmol/L      Calcium 9.8 mg/dL      BUN/Creatinine Ratio 22.3     Anion Gap 9.0 mmol/L       eGFR 25.4 mL/min/1.73     Narrative:      GFR Normal >60  Chronic Kidney Disease <60  Kidney Failure <15    The GFR formula is only valid for adults with stable renal function between ages 18 and 70.    CBC Auto Differential [[id number]]  (Abnormal) Collected: 06/30/24 0417    Specimen: Blood Updated: 06/30/24 0459     WBC 5.66 10*3/mm3      RBC 4.10 10*6/mm3      Hemoglobin 12.7 g/dL      Hematocrit 39.6 %      MCV 96.6 fL      MCH 31.0 pg      MCHC 32.1 g/dL      RDW 17.1 %      RDW-SD 60.1 fl      MPV 12.0 fL      Platelets 166 10*3/mm3      Neutrophil % 69.8 %      Lymphocyte % 13.1 %      Monocyte % 12.0 %      Eosinophil % 3.0 %      Basophil % 0.7 %      Immature Grans % 1.4 %      Neutrophils, Absolute 3.95 10*3/mm3      Lymphocytes, Absolute 0.74 10*3/mm3      Monocytes, Absolute 0.68 10*3/mm3      Eosinophils, Absolute 0.17 10*3/mm3      Basophils, Absolute 0.04 10*3/mm3      Immature Grans, Absolute 0.08 10*3/mm3      nRBC 0.0 /100 WBC     High Sensitivity Troponin T 2Hr [[id number]]  (Abnormal) Collected: 06/29/24 1320    Specimen: Blood Updated: 06/29/24 1350     HS Troponin T 125 ng/L      Troponin T Delta 9 ng/L     Narrative:      High Sensitive Troponin T Reference Range:  <14.0 ng/L- Negative Female for AMI  <22.0 ng/L- Negative Male for AMI  >=14 - Abnormal Female indicating possible myocardial injury.  >=22 - Abnormal Male indicating possible myocardial injury.   Clinicians would have to utilize clinical acumen, EKG, Troponin, and serial changes to determine if it is an Acute Myocardial Infarction or myocardial injury due to an underlying chronic condition.         Respiratory Panel PCR w/COVID-19(SARS-CoV-2) JORDYN/MATILDE/BRYAN/PAD/COR/ASHER In-House, NP Swab in [facility], 2 HR TAT - Swab, Nasopharynx [[id number]]  (Normal) Collected: 06/29/24 1027    Specimen: Swab from Nasopharynx Updated: 06/29/24 1120     ADENOVIRUS, PCR Not Detected     Coronavirus 229E Not Detected     Coronavirus HKU1 Not Detected      Coronavirus NL63 Not Detected     Coronavirus OC43 Not Detected     COVID19 Not Detected     Human Metapneumovirus Not Detected     Human Rhinovirus/Enterovirus Not Detected     Influenza A PCR Not Detected     Influenza B PCR Not Detected     Parainfluenza Virus 1 Not Detected     Parainfluenza Virus 2 Not Detected     Parainfluenza Virus 3 Not Detected     Parainfluenza Virus 4 Not Detected     RSV, PCR Not Detected     Bordetella pertussis pcr Not Detected     Bordetella parapertussis PCR Not Detected     Chlamydophila pneumoniae PCR Not Detected     Mycoplasma pneumo by PCR Not Detected    Narrative:      In the setting of a positive respiratory panel with a viral infection PLUS a negative procalcitonin without other underlying concern for bacterial infection, consider observing off antibiotics or discontinuation of antibiotics and continue supportive care. If the respiratory panel is positive for atypical bacterial infection (Bordetella pertussis, Chlamydophila pneumoniae, or Mycoplasma pneumoniae), consider antibiotic de-escalation to target atypical bacterial infection.            Results for orders placed during the hospital encounter of 06/29/24    Adult Stress Echo W/ Cont or Stress Agent if Necessary Per Protocol    Interpretation Summary    Overall, this is an intermediate risk test for ischemia.    No ECG evidence of myocardial ischemia. Negative clinical evidence of myocardial ischemia. Findings consistent with a normal ECG stress test.    Abnormal stress echo consistent with an intermediate risk study for myocardial ischemia.    Left ventricular systolic function is normal. Left ventricular ejection fraction appears to be 61 - 65%.    The following left ventricular wall segments are hypokinetic: apical septal and apex hypokinetic.       Imaging Results (Last 72 Hours)       Procedure Component Value Units Date/Time    US Venous Doppler Lower Extremity Bilateral (duplex) [[id number]] Collected: 07/01/24  1325     Updated: 07/01/24 1328    Narrative:      History: Swelling       Impression:      Impression: There is no evidence of deep venous thrombosis or  superficial thrombophlebitis of right or left lower extremities.     Comments: Bilateral lower extremity venous duplex exam was performed  using color Doppler flow, Doppler waveform analysis, and grayscale  imaging, with and without compression. There is no evidence of deep  venous thrombosis in the common femoral, superficial femoral, popliteal,  peroneal, anterior tibial, and posterior tibial veins bilaterally. No  thrombus is identified in the saphenofemoral junctions and greater  saphenous veins bilaterally.            This report was signed and finalized on 7/1/2024 1:25 PM by Dr. Sidney Connors MD.       NM Lung Ventilation Perfusion [[id number]] Collected: 07/01/24 0823     Updated: 07/01/24 0828    Narrative:      EXAMINATION: NM LUNG VENTILATION PERFUSION-     7/1/2024 7:16 AM     HISTORY: chest pain and hypoxia; J18.9-Pneumonia, unspecified organism;  J84.10-Pulmonary fibrosis, unspecified; I51.7-Cardiomegaly; N17.9-Acute  kidney failure, unspecified; N18.9-Chronic kidney disease, unspecified;  R79.89-Other specified abnormal findings of blood chemistry.     After the intravenous injection of 5.4 mCi of technetium 99m  macroaggregated albumin, the images of the lungs were obtained in  anterior, posterior, both oblique and lateral projections with help for  scintillation camera.     The ventilation scans were not obtained.     The comparison is made with the previous study dated 7/23/2020.     Ill-defined, nonsegmental, linear, perfusion defects in both lungs are  similar to the previous study. There are no segmental or subsegmental  perfusion defects noted.       Impression:      1. Low probability of acute pulmonary embolism.        This report was signed and finalized on 7/1/2024 8:25 AM by Dr. Kelly Coleman MD.       CT Chest Without Contrast  Diagnostic [[id number]] Collected: 06/29/24 1338     Updated: 06/29/24 1351    Narrative:      EXAMINATION:  CT CHEST WO CONTRAST DIAGNOSTIC-  6/29/2024 10:35 AM     HISTORY: Shortness of breath, increased oxygen, pain in chest and back;  J18.9-Pneumonia, unspecified organism; J84.10-Pulmonary fibrosis,  unspecified; I51.7-Cardiomegaly; N17.9-Acute kidney failure,  unspecified; N18.9-Chronic kidney disease, unspecified; R79.89-Other  specified abnormal findings of blood chemistry. Lung cancer.     COMPARISON : 5/6/2024.     DLP: 556.65 mGy.cm Automated dosage reduction technique was utilized to  reduce patient dosage.     TECHNIQUE: Spiral CT was performed of the chest without contrast.  Sagittal and coronal images were reconstructed.     MEDIASTINUM, HEART AND VASCULAR STRUCTURES: There is atheromatous  calcification of the thoracic aorta and coronary arteries. The ascending  thoracic aorta is dilated measuring 4.3 cm. The main pulmonary artery  segment is dilated measuring 3.6 cm. There are small mediastinal lymph  nodes many of which demonstrate calcification. There is a 1.4 cm short  axis diameter AP window lymph node that appears stable. There is mild  cardiomegaly.     LUNGS: There is pleural thickening on the right that is most prominent  in the right lung base laterally where the pleural thickness measures  about 2.3 cm. A spiculated right upper lobe lesion laterally measures  4.7 x 2.1 cm, previously 4 x 2.2 cm. Consolidation in the right upper  lobe more superiorly and anteriorly may be related to posttreatment  changes of a previously described spiculated lesion. There are  reticulonodular changes in both lungs likely related to pulmonary  fibrosis. There is interlobular septal thickening. Emphysematous changes  are noted.     UPPER ABDOMEN: Please see the abdomen and pelvis CT report separately.     BONES: There are degenerative changes of the spine. No definite acute  bony abnormality is seen.           Impression:      1. A spiculated mass in the right upper lobe laterally is larger on the  current study.  2. There is consolidation around a previously described nodule in the  right lung apex likely related to posttreatment change.  3. Diffuse reticulonodular fibrotic changes throughout both lungs.  Emphysema.  4. Pleural thickening on the right that is more focal in the right lung  base laterally. Metastatic pleural disease is considered.  5. Ectasia of the ascending thoracic aorta measuring 4.3 cm. Main  pulmonary artery segment is dilated measuring 3.6 cm. Mild cardiomegaly.     6. Mediastinal lymph nodes appear stable. Multiple lymph nodes are  calcified.        The full report of this exam was immediately signed and available to the  emergency room. The patient is currently in the emergency room.           This report was signed and finalized on 6/29/2024 1:48 PM by Dr. Ramos Lagos MD.       CT Abdomen Pelvis Without Contrast [[id number]] Collected: 06/29/24 1208     Updated: 06/29/24 1230    Narrative:      EXAMINATION:  CT ABDOMEN PELVIS WO CONTRAST-  6/29/2024 10:35 AM     HISTORY: Shortness of breath, increased oxygen, pain in chest and back;  J18.9-Pneumonia, unspecified organism; J84.10-Pulmonary fibrosis,  unspecified; I51.7-Cardiomegaly; N17.9-Acute kidney failure,  unspecified; N18.9-Chronic kidney disease, unspecified; R79.89-Other  specified abnormal findings of blood chemistry.     TECHNIQUE: Spiral CT was performed of the abdomen and pelvis without  contrast. Multiplanar images were reconstructed.     DLP: 556.65 mGy.cm Automated dosage reduction technique was utilized to  reduce patient dosage.     COMPARISON: 1/5/2023.     LUNG BASES: There is coronary artery calcification. There is mild  bilateral gynecomastia. There is focal pleural thickening in the right  lung base laterally. There is fibrosis in the lung bases as well as  emphysema.     LIVER AND SPLEEN: There is air within the biliary  tree. No focal liver  lesion is seen. Prior cholecystectomy. The unenhanced spleen is  unremarkable.     PANCREAS: Normal unenhanced appearance of the pancreas.     KIDNEYS AND ADRENALS: The adrenal glands are normal. There are renal  cysts on the left. There is a 6 mm nonobstructive stone in the lower  pole left kidney. The ureters are nondilated. The urinary bladder is  unremarkable. The prostate measures about 4.7 cm transversely.     BOWEL: Gastric wall thickening likely related to under distention. A  proximal small bowel loop is mildly dilated measuring 4 cm. No other  small bowel distention is seen. The appendix is normal. There is under  distention of portions of the colon. There is moderate stool in the  colon.     OTHER: There is atheromatous disease of the aortoiliac vessels. There  are bilateral fat-containing inguinal hernias. The hernia on the left  extends all the way down to the scrotum. There are degenerative changes  of the spine. There is coarsening of the trabecula in the right femoral  neck and trochanteric region.          Impression:      1. There is a mildly dilated proximal small bowel loop measuring 4 cm.  No other small bowel distention is seen. This could be transient or due  to partial obstruction. Gastric wall thickening likely related to under  distention. There is under distention of portions of the colon and  moderate stool in the colon.  2. 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  Fibrosis in the lung bases with emphysema.  3. Left renal cyst. A 6 mm nonobstructive renal stone lower pole on the  left.  4. Mildly enlarged prostate measuring 4.7 cm.  5. Air within the biliary tree likely related to prior sphincterotomy.  Prior cholecystectomy.  6. Coarsening of the trabecula in the right femoral neck and  trochanteric region could be related to early Paget's disease. There is  no cortical thickening. A lipo sclerosing myxofibrous tumor is felt to  be less  "likely as there is no sclerotic margin.        The full report of this exam was immediately signed and available to the  emergency room. The patient is currently in the emergency room.     This report was signed and finalized on 6/29/2024 12:27 PM by Dr. Rmaos Lagos MD.             Assessment & Plan       Chest pain    Dyspnea    Essential hypertension    Malignant neoplasm of upper lobe of right lung    Stage 3 chronic kidney disease    Adenocarcinoma, lung    Fibrotic lung diseases    Acute renal failure superimposed on chronic kidney disease    GERD without esophagitis    Former smoker    Plan:  Chest Pain/Intermediate Stress/Mild Coronary Artery Disease- patient's \"chest pain\" is very atypical. Posterior RLQ pain- sharp and worse with a deep breath. Lengthy discussion had about options to include medical management vs heart cath. Given comorbidities (CAROLINA on CKD, Stage IV Lung Cancer with possible pleura involvement in the area of pain), advanced age and lack of anginal symptoms patient wishes to proceed with medical management. Patient is on Aspirin and Lipitor. Start Imdur.     Stage IV metastatic adenocarcinoma of RUL- possible pleural involvement with pleural thickening- that would explain atypical chest pain. Dr. Michele has been consulted    CAROLINA on Chronic Kidney Disease- improving    Hyperkalemia- treatment per primary team     Fibrotic Lung Disease- chronic shortness of breath. Stable.     Ectasia of ascending aorta at 4.3 cm- will need yearly scans. Measurement in May was 3.8.     Further orders per Dr. Gordon     Thank you for asking us to follow this patient with you.       Electronically signed by MARITZA Mosqueda, 07/02/24, 11:20 AM CDT.    "

## 2024-07-02 NOTE — THERAPY EVALUATION
Patient Name: Karoline Prater  : 1940    MRN: [id number]                              Today's Date: 2024       Admit Date: 2024    Visit Dx:     ICD-10-CM ICD-9-CM   1. Pneumonia of right lung due to infectious organism, unspecified part of lung  J18.9 483.8   2. Pulmonary fibrosis  J84.10 515   3. Cardiomegaly  I51.7 429.3   4. Acute kidney injury superimposed on chronic kidney disease  N17.9 584.9    N18.9 585.9   5. Elevated troponin  R79.89 790.6     Patient Active Problem List   Diagnosis    Dyspnea    Essential hypertension    NSVT (nonsustained ventricular tachycardia)    Malignant neoplasm of upper lobe of right lung    Stage 3 chronic kidney disease    Adenocarcinoma, lung    Pancytopenia due to antineoplastic chemotherapy    Pneumonia due to infectious organism    Function kidney decreased    Cellulitis    Acute respiratory failure with hypoxia    Fibrotic lung diseases    Macrocytic anemia    Thrombocytopenia    Sepsis    Right pneumothorax    Hyperkalemia    Acute renal failure superimposed on chronic kidney disease    GERD without esophagitis    A-fib    Former smoker    Chest pain     Past Medical History:   Diagnosis Date    Arthritis     Atrial fibrillation with rapid ventricular response 2019    Blindness of left eye     BPH with obstruction/lower urinary tract symptoms     Cancer     stomach & lung    CHF (congestive heart failure)     CKD (chronic kidney disease) stage 3, GFR 30-59 ml/min     Coronary artery disease     Elevated cholesterol     Essential hypertension 10/02/2017    Fibrotic lung diseases     GERD (gastroesophageal reflux disease)     Hearing loss     History of transfusion     Hydronephrosis of left kidney     Hyperlipidemia     Hypertension     pt was taken off of all of his medications for BP (atenolol, lisinopril, lasix) because his BP kept bottoming out so his primary dr told him to discontinue them 1-2 months ago (/2019). pt states he takes no  medications currently.    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 Patel is just going to keep an eye on it and not do surgery right now.    Mediastinal adenopathy 10/24/2018    [facility]    Monoclonal gammopathy of unknown significance (MGUS) 09/11/2018    Pancreatic mass     pt states he had this in 2013 but it went away on its own. Now recent CT shows it has come back so he is going to have an ultrasound on 3/13/19.    Shortness of breath      Past Surgical History:   Procedure Laterality Date    ABDOMINAL SURGERY      BRONCHOSCOPY N/A 10/24/2018    Procedure: BRONCHOSCOPY WITH BIOPSY, EBUS;  Surgeon: Gareth Becerra MD;  Location: [facility] OR;  Service: Pulmonary    CHOLECYSTECTOMY      COLONOSCOPY N/A 1/3/2019    Procedure: COLONOSCOPY WITH ANESTHESIA;  Surgeon: Randy Somers DO;  Location: [facility] ENDOSCOPY;  Service: Gastroenterology    CYSTOSCOPY, RETROGRADE PYELOGRAM AND STENT INSERTION Left 3/8/2019    Procedure: CYSTOSCOPY RETROGRADE BILATERAL PYELOGRAM;  Surgeon: Jos Sylvester MD;  Location: [facility] OR;  Service: Urology    ENDOSCOPY N/A 12/11/2018    Procedure: ESOPHAGOGASTRODUODENOSCOPY WITH ANESTHESIA;  Surgeon: Randy Somers DO;  Location: [facility] ENDOSCOPY;  Service: Gastroenterology    ENDOSCOPY N/A 4/29/2019    Procedure: ESOPHAGOGASTRODUODENOSCOPY WITH ANESTHESIA;  Surgeon: Lilliam Jj MD;  Location: [facility] OR;  Service: Gastroenterology    ENDOSCOPY N/A 5/9/2019    Procedure: ESOPHAGOGASTRODUODENOSCOPY WITH ANESTHESIA;  Surgeon: Pilo Bansal MD;  Location: [facility] ENDOSCOPY;  Service: Gastroenterology    EYE SURGERY Left 1964    FOOT SURGERY Right 1966    joint    FRACTURE SURGERY        General Information       Row Name 07/02/24 1010          Physical Therapy Time and Intention    Document Type evaluation  -KR      Mode of Treatment physical therapy  -KR       Row Name 07/02/24 1010          General Information    Patient Profile Reviewed yes  -KR     Existing Precautions/Restrictions fall  monitor O2 sat  -KR       Row Name 07/02/24 1010          Living Environment    People in Home alone  -KR       Row Name 07/02/24 1010          Home Main Entrance    Number of Stairs, Main Entrance one  -KR       Row Name 07/02/24 1010          Cognition    Orientation Status (Cognition) oriented x 4  -KR       Row Name 07/02/24 1010          Safety Issues, Functional Mobility    Impairments Affecting Function (Mobility) balance;endurance/activity tolerance;strength;shortness of breath  -KR               User Key  (r) = Recorded By, (t) = Taken By, (c) = Cosigned By      Initials Name Provider Type    Magda Mcgowan, PT DPT Physical Therapist                   Mobility       Row Name 07/02/24 1010          Bed Mobility    Bed Mobility supine-sit  -KR     Supine-Sit [place] (Bed Mobility) modified independence  -KR     Assistive Device (Bed Mobility) bed rails;head of bed elevated  -KR       Row Name 07/02/24 1010          Sit-Stand Transfer    Sit-Stand [place] (Transfers) contact guard;verbal cues  -KR       Row Name 07/02/24 1010          Gait/Stairs (Locomotion)    [place] Level (Gait) contact guard;verbal cues  -KR     Patient was able to Ambulate yes  -KR     Distance in Feet (Gait) 120  -KR     Deviations/Abnormal Patterns (Gait) stride length decreased;base of support, wide  -KR     Bilateral Gait Deviations decreased arm swing  -KR               User Key  (r) = Recorded By, (t) = Taken By, (c) = Cosigned By      Initials Name Provider Type    Magda Mcgowan, PT DPT Physical Therapist                   Obj/Interventions       Row Name 07/02/24 1010          Range of Motion Comprehensive    General Range of Motion bilateral lower extremity ROM WFL  -KR       Row Name 07/02/24 1010          Strength  Comprehensive (MMT)    Comment, General Manual Muscle Testing (MMT) Assessment functionally 4/5 B LEs  -KR       Row Name 07/02/24 1010          Balance    Balance Assessment sitting static balance;sitting dynamic balance;standing static balance;standing dynamic balance  -KR     Static Sitting Balance independent  -KR     Dynamic Sitting Balance independent  -KR     Position, Sitting Balance unsupported;sitting edge of bed  -KR     Static Standing Balance standby assist  -KR     Dynamic Standing Balance contact guard;verbal cues  -KR     Position/Device Used, Standing Balance unsupported  -KR       Row Name 07/02/24 1010          Sensory Assessment (Somatosensory)    Sensory Assessment (Somatosensory) LE sensation intact  -KR               User Key  (r) = Recorded By, (t) = Taken By, (c) = Cosigned By      Initials Name Provider Type    Magda Mcgowan, PT DPT Physical Therapist                   Goals/Plan       Row Name 07/02/24 1010          Transfer Goal 1 (PT)    Activity/Assistive Device (Transfer Goal 1, PT) transfers, all  -KR     [place] Level/Cues Needed (Transfer Goal 1, PT) modified independence  -KR     Time Frame (Transfer Goal 1, PT) by discharge  -KR     Strategies/Barriers (Transfers Goal 1, PT) with O2 > 93% on room air  -KR     Progress/Outcome (Transfer Goal 1, PT) new goal  -KR       Row Name 07/02/24 1010          Gait Training Goal 1 (PT)    Activity/Assistive Device (Gait Training Goal 1, PT) gait (walking locomotion)  -KR     [place] Level (Gait Training Goal 1, PT) standby assist  -KR     Distance (Gait Training Goal 1, PT) 250 with O2 > 93% on room air  -KR     Time Frame (Gait Training Goal 1, PT) by discharge  -KR     Progress/Outcome (Gait Training Goal 1, PT) new goal  -KR       Row Name 07/02/24 1010          Stairs Goal 1 (PT)    Activity/Assistive Device (Stairs Goal 1, PT) stairs, all skills  -KR     [place] Level/Cues Needed (Stairs Goal 1, PT) standby assist   -KR     Number of Stairs (Stairs Goal 1, PT) 1 with O2 > 93% on room air  -KR     Time Frame (Stairs Goal 1, PT) by discharge  -KR     Progress/Outcome (Stairs Goal 1, PT) new goal  -KR       Row Name 07/02/24 1010          Therapy Assessment/Plan (PT)    Planned Therapy Interventions (PT) balance training;gait training;home exercise program;postural re-education;patient/family education;neuromuscular re-education;stair training;strengthening;transfer training  -KR               User Key  (r) = Recorded By, (t) = Taken By, (c) = Cosigned By      Initials Name Provider Type    KR Magda Reddy, PT DPT Physical Therapist                   Clinical Impression       Row Name 07/02/24 1010          Pain    Pretreatment Pain Rating 0/10 - no pain  -KR     Posttreatment Pain Rating 0/10 - no pain  -KR       Row Name 07/02/24 1010          Plan of Care Review    Plan of Care Reviewed With patient  -KR     Outcome Evaluation Physical therapy evaluation performed. Pt alert and oriented x 4, 2LNC off and beside pt in bed, reports taking off around 8AM. He reports using O2 PRN. 91% on room air 115 bpm, after ambulating in hallway, pt was short of breath and O2 90%  bpm. Skilled PT needed to work on endurance, balance and gait. He needed CGA for gait in hallway and verbal cues for gait. Pt would benefit from HH upon d/c.  -KR       Row Name 07/02/24 1010          Therapy Assessment/Plan (PT)    Patient/Family Therapy Goals Statement (PT) go home  -KR     Rehab Potential (PT) good, to achieve stated therapy goals  -KR     Criteria for Skilled Interventions Met (PT) yes  -KR     Therapy Frequency (PT) 2 times/day  -KR     Predicted Duration of Therapy Intervention (PT) until d/c  -KR       Row Name 07/02/24 1010          Vital Signs    Pre SpO2 (%) 91  -KR     O2 Delivery Pre Treatment room air  -KR     Intra SpO2 (%) 93  -KR     O2 Delivery Intra Treatment room air  -KR     Post SpO2 (%) 90  -KR     O2 Delivery Post  Treatment room air  -KR     Pre Patient Position Supine  -KR     Intra Patient Position Sitting  -KR     Post Patient Position Sitting  -KR     Rest Breaks  1  -KR       Row Name 07/02/24 1010          Positioning and Restraints    Pre-Treatment Position in bed  -KR     Post Treatment Position bed  -KR     In Bed sitting EOB;encouraged to call for assist;call light within reach  -KR               User Key  (r) = Recorded By, (t) = Taken By, (c) = Cosigned By      Initials Name Provider Type    Magda Mcgowan, PT DPT Physical Therapist                   Outcome Measures       Row Name 07/02/24 1010 07/01/24 2320       How much help from another person do you currently need...    Turning from your back to your side while in flat bed without using bedrails? 4  -KR 4  -KRA    Moving from lying on back to sitting on the side of a flat bed without bedrails? 4  -KR 4  -KRA    Moving to and from a bed to a chair (including a wheelchair)? 4  -KR 4  -KRA    Standing up from a chair using your arms (e.g., wheelchair, bedside chair)? 4  -KR 4  -KRA    Climbing 3-5 steps with a railing? 3  -KR 3  -KRA    To walk in hospital room? 3  -KR 3  -KRA    AM-PAC 6 Clicks Score (PT) 22  -KR 22  -KRA    Highest Level of Mobility Goal 7 --> Walk 25 feet or more  -KR 7 --> Walk 25 feet or more  -KRA      Row Name 07/02/24 1010          Functional Assessment    Outcome Measure Options AM-PAC 6 Clicks Basic Mobility (PT)  -KR               User Key  (r) = Recorded By, (t) = Taken By, (c) = Cosigned By      Initials Name Provider Type    Magda Mcgowan, PT DPT Physical Therapist    Janki Durham, RN Registered Nurse                                 Physical Therapy Education       Title: PT OT SLP Therapies (In Progress)       Topic: Physical Therapy (In Progress)       Point: Mobility training (Done)       Learning Progress Summary             Patient Acceptance, E, MALIK,NR by BOB at 7/2/2024 1031                          Point: Home exercise program (Not Started)       Learner Progress:  Not documented in this visit.              Point: Body mechanics (Done)       Learning Progress Summary             Patient Acceptance, E, VU,NR by KR at 7/2/2024 1031                         Point: Precautions (Done)       Learning Progress Summary             Patient Acceptance, E, VU,NR by KR at 7/2/2024 1031                                         User Key       Initials Effective Dates Name Provider Type Discipline     06/03/24 -  Magda Reddy, PT DPT Physical Therapist PT                  PT Recommendation and Plan  Planned Therapy Interventions (PT): balance training, gait training, home exercise program, postural re-education, patient/family education, neuromuscular re-education, stair training, strengthening, transfer training  Plan of Care Reviewed With: patient  Outcome Evaluation: Physical therapy evaluation performed. Pt alert and oriented x 4, 2LNC off and beside pt in bed, reports taking off around 8AM. He reports using O2 PRN. 91% on room air 115 bpm, after ambulating in hallway, pt was short of breath and O2 90%  bpm. Skilled PT needed to work on endurance, balance and gait. He needed CGA for gait in hallway and verbal cues for gait. Pt would benefit from HH upon d/c.     Time Calculation:         PT Charges       Row Name 07/02/24 1032             Time Calculation    Start Time 1010  -KR      Stop Time 1035  -KR      Time Calculation (min) 25 min  -KR      PT Received On 07/02/24  -KR      PT Goal Re-Cert Due Date 07/12/24  -KR                User Key  (r) = Recorded By, (t) = Taken By, (c) = Cosigned By      Initials Name Provider Type    KR Magda Reddy, PT DPT Physical Therapist                  Therapy Charges for Today       Code Description Service Date Service Provider Modifiers Qty    [id number] HC PT EVAL MOD COMPLEXITY 2 7/2/2024 Magda Reddy, PT DPT GP 1            PT G-Codes  Outcome  Measure Options: AM-PAC 6 Clicks Basic Mobility (PT)  AM-PAC 6 Clicks Score (PT): 22  AM-PAC 6 Clicks Score (OT): 21  PT Discharge Summary  Anticipated Discharge Disposition (PT): home with home health    Magda Reddy, PT DPT  7/2/2024

## 2024-07-03 ENCOUNTER — DOCUMENTATION (OUTPATIENT)
Age: 84
End: 2024-07-03
Payer: MEDICARE

## 2024-07-03 ENCOUNTER — APPOINTMENT (OUTPATIENT)
Dept: CARDIOLOGY | Facility: HOSPITAL | Age: 84
End: 2024-07-03
Payer: MEDICARE

## 2024-07-03 PROBLEM — R00.0 TACHYCARDIA: Status: ACTIVE | Noted: 2024-07-03

## 2024-07-03 LAB
ANION GAP SERPL CALCULATED.3IONS-SCNC: 11 MMOL/L (ref 5–15)
BUN SERPL-MCNC: 46 MG/DL (ref 8–23)
BUN/CREAT SERPL: 22.8 (ref 7–25)
CALCIUM SPEC-SCNC: 10.2 MG/DL (ref 8.6–10.5)
CHLORIDE SERPL-SCNC: 105 MMOL/L (ref 98–107)
CO2 SERPL-SCNC: 21 MMOL/L (ref 22–29)
CREAT SERPL-MCNC: 2.02 MG/DL (ref 0.76–1.27)
DEPRECATED RDW RBC AUTO: 57.6 FL (ref 37–54)
EGFRCR SERPLBLD CKD-EPI 2021: 32.3 ML/MIN/1.73
ERYTHROCYTE [DISTWIDTH] IN BLOOD BY AUTOMATED COUNT: 16.8 % (ref 12.3–15.4)
GLUCOSE SERPL-MCNC: 110 MG/DL (ref 65–99)
HCT VFR BLD AUTO: 40.6 % (ref 37.5–51)
HGB BLD-MCNC: 13.3 G/DL (ref 13–17.7)
MAGNESIUM SERPL-MCNC: 1.8 MG/DL (ref 1.6–2.4)
MCH RBC QN AUTO: 30.9 PG (ref 26.6–33)
MCHC RBC AUTO-ENTMCNC: 32.8 G/DL (ref 31.5–35.7)
MCV RBC AUTO: 94.2 FL (ref 79–97)
PLATELET # BLD AUTO: 176 10*3/MM3 (ref 140–450)
PMV BLD AUTO: 12 FL (ref 6–12)
POTASSIUM SERPL-SCNC: 4.6 MMOL/L (ref 3.5–5.2)
RBC # BLD AUTO: 4.31 10*6/MM3 (ref 4.14–5.8)
SODIUM SERPL-SCNC: 137 MMOL/L (ref 136–145)
WBC NRBC COR # BLD AUTO: 10.33 10*3/MM3 (ref 3.4–10.8)

## 2024-07-03 PROCEDURE — 93306 TTE W/DOPPLER COMPLETE: CPT | Performed by: EMERGENCY MEDICINE

## 2024-07-03 PROCEDURE — 99232 SBSQ HOSP IP/OBS MODERATE 35: CPT | Performed by: NURSE PRACTITIONER

## 2024-07-03 PROCEDURE — 63710000001 PREDNISONE PER 1 MG: Performed by: FAMILY MEDICINE

## 2024-07-03 PROCEDURE — 85027 COMPLETE CBC AUTOMATED: CPT | Performed by: FAMILY MEDICINE

## 2024-07-03 PROCEDURE — 97116 GAIT TRAINING THERAPY: CPT

## 2024-07-03 PROCEDURE — 93306 TTE W/DOPPLER COMPLETE: CPT

## 2024-07-03 PROCEDURE — 83735 ASSAY OF MAGNESIUM: CPT | Performed by: INTERNAL MEDICINE

## 2024-07-03 PROCEDURE — 97535 SELF CARE MNGMENT TRAINING: CPT | Performed by: OCCUPATIONAL THERAPIST

## 2024-07-03 PROCEDURE — 80048 BASIC METABOLIC PNL TOTAL CA: CPT | Performed by: FAMILY MEDICINE

## 2024-07-03 PROCEDURE — 25010000002 ENOXAPARIN PER 10 MG: Performed by: FAMILY MEDICINE

## 2024-07-03 PROCEDURE — 99222 1ST HOSP IP/OBS MODERATE 55: CPT | Performed by: STUDENT IN AN ORGANIZED HEALTH CARE EDUCATION/TRAINING PROGRAM

## 2024-07-03 PROCEDURE — 97530 THERAPEUTIC ACTIVITIES: CPT

## 2024-07-03 PROCEDURE — 93005 ELECTROCARDIOGRAM TRACING: CPT | Performed by: FAMILY MEDICINE

## 2024-07-03 RX ORDER — PREDNISONE 20 MG/1
TABLET ORAL
Qty: 9 TABLET | Refills: 0 | Status: SHIPPED | OUTPATIENT
Start: 2024-07-04 | End: 2024-07-04

## 2024-07-03 RX ORDER — ASPIRIN 81 MG/1
81 TABLET ORAL DAILY
Qty: 90 TABLET | Refills: 0 | Status: SHIPPED | OUTPATIENT
Start: 2024-07-04

## 2024-07-03 RX ORDER — ATORVASTATIN CALCIUM 20 MG/1
20 TABLET, FILM COATED ORAL NIGHTLY
Qty: 90 TABLET | Refills: 0 | Status: SHIPPED | OUTPATIENT
Start: 2024-07-03

## 2024-07-03 RX ORDER — PREDNISONE 20 MG/1
20 TABLET ORAL
Status: DISCONTINUED | OUTPATIENT
Start: 2024-07-04 | End: 2024-07-04 | Stop reason: HOSPADM

## 2024-07-03 RX ORDER — UREA 10 %
5 LOTION (ML) TOPICAL NIGHTLY
Status: DISCONTINUED | OUTPATIENT
Start: 2024-07-03 | End: 2024-07-04 | Stop reason: HOSPADM

## 2024-07-03 RX ORDER — LOSARTAN POTASSIUM 50 MG/1
50 TABLET ORAL DAILY
Status: DISCONTINUED | OUTPATIENT
Start: 2024-07-03 | End: 2024-07-04 | Stop reason: HOSPADM

## 2024-07-03 RX ORDER — ISOSORBIDE MONONITRATE 30 MG/1
30 TABLET, EXTENDED RELEASE ORAL
Qty: 90 TABLET | Refills: 0 | Status: SHIPPED | OUTPATIENT
Start: 2024-07-04

## 2024-07-03 RX ADMIN — MONTELUKAST SODIUM 10 MG: 10 TABLET, FILM COATED ORAL at 20:17

## 2024-07-03 RX ADMIN — ENOXAPARIN SODIUM 30 MG: 100 INJECTION SUBCUTANEOUS at 09:06

## 2024-07-03 RX ADMIN — METOPROLOL SUCCINATE 25 MG: 25 TABLET, EXTENDED RELEASE ORAL at 09:07

## 2024-07-03 RX ADMIN — METOPROLOL TARTRATE 25 MG: 25 TABLET, FILM COATED ORAL at 17:11

## 2024-07-03 RX ADMIN — ISOSORBIDE MONONITRATE 30 MG: 60 TABLET, EXTENDED RELEASE ORAL at 09:06

## 2024-07-03 RX ADMIN — DOCUSATE SODIUM AND SENNOSIDES 2 TABLET: 8.6; 5 TABLET, FILM COATED ORAL at 09:14

## 2024-07-03 RX ADMIN — PREDNISONE 40 MG: 20 TABLET ORAL at 09:06

## 2024-07-03 RX ADMIN — ATORVASTATIN CALCIUM 20 MG: 10 TABLET, FILM COATED ORAL at 20:17

## 2024-07-03 RX ADMIN — ASPIRIN 81 MG: 81 TABLET, COATED ORAL at 09:06

## 2024-07-03 RX ADMIN — LOSARTAN POTASSIUM 50 MG: 50 TABLET, FILM COATED ORAL at 12:45

## 2024-07-03 RX ADMIN — Medication 1 TABLET: at 09:07

## 2024-07-03 RX ADMIN — SODIUM BICARBONATE 650 MG: 650 TABLET ORAL at 20:17

## 2024-07-03 RX ADMIN — FLUTICASONE PROPIONATE 1 SPRAY: 50 SPRAY, METERED NASAL at 09:05

## 2024-07-03 RX ADMIN — ACETAMINOPHEN 1000 MG: 500 TABLET, FILM COATED ORAL at 20:17

## 2024-07-03 RX ADMIN — TAMSULOSIN HYDROCHLORIDE 0.4 MG: 0.4 CAPSULE ORAL at 20:17

## 2024-07-03 RX ADMIN — SODIUM BICARBONATE 650 MG: 650 TABLET ORAL at 09:07

## 2024-07-03 RX ADMIN — PANTOPRAZOLE SODIUM 20 MG: 20 TABLET, DELAYED RELEASE ORAL at 09:07

## 2024-07-03 RX ADMIN — SODIUM BICARBONATE 650 MG: 650 TABLET ORAL at 15:52

## 2024-07-03 RX ADMIN — Medication 10 ML: at 09:15

## 2024-07-03 RX ADMIN — Medication 10 ML: at 20:00

## 2024-07-03 RX ADMIN — Medication 5 MG: at 20:17

## 2024-07-03 RX ADMIN — ALLOPURINOL 100 MG: 100 TABLET ORAL at 09:06

## 2024-07-03 NOTE — THERAPY TREATMENT NOTE
Acute Care - Physical Therapy Treatment Note  [facility]     Patient Name: Karoline Prater  : 1940  MRN: [id number]  Today's Date: 7/3/2024      Visit Dx:     ICD-10-CM ICD-9-CM   1. Pneumonia of right lung due to infectious organism, unspecified part of lung  J18.9 483.8   2. Pulmonary fibrosis  J84.10 515   3. Cardiomegaly  I51.7 429.3   4. Acute kidney injury superimposed on chronic kidney disease  N17.9 584.9    N18.9 585.9   5. Elevated troponin  R79.89 790.6     Patient Active Problem List   Diagnosis    Dyspnea    Essential hypertension    NSVT (nonsustained ventricular tachycardia)    Malignant neoplasm of upper lobe of right lung    Stage 3 chronic kidney disease    Adenocarcinoma, lung    Pancytopenia due to antineoplastic chemotherapy    Pneumonia due to infectious organism    Function kidney decreased    Cellulitis    Acute respiratory failure with hypoxia    Fibrotic lung diseases    Macrocytic anemia    Thrombocytopenia    Sepsis    Right pneumothorax    Hyperkalemia    Acute renal failure superimposed on chronic kidney disease    GERD without esophagitis    A-fib    Former smoker    Chest pain    Tachycardia     Past Medical History:   Diagnosis Date    Arthritis     Atrial fibrillation with rapid ventricular response 2019    Blindness of left eye     BPH with obstruction/lower urinary tract symptoms     Cancer     stomach & lung    CHF (congestive heart failure)     CKD (chronic kidney disease) stage 3, GFR 30-59 ml/min     Coronary artery disease     Elevated cholesterol     Essential hypertension 10/02/2017    Fibrotic lung diseases     GERD (gastroesophageal reflux disease)     Hearing loss     History of transfusion     Hydronephrosis of left kidney     Hyperlipidemia     Hypertension     pt was taken off of all of his medications for BP (atenolol, lisinopril, lasix) because his BP kept bottoming out so his primary dr told him to discontinue them 1-2 months ago (/2019).  If you have any questions or concerns about today's appointment, the verbal and/or written instructions you were given for follow up care, please call our office at [phone].     [facility] - [address], [address] [place] [address]    [phone] office  [phone] pt states he takes no medications currently.    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 Patel is just going to keep an eye on it and not do surgery right now.    Mediastinal adenopathy 10/24/2018    [facility]    Monoclonal gammopathy of unknown significance (MGUS) 09/11/2018    Pancreatic mass     pt states he had this in 2013 but it went away on its own. Now recent CT shows it has come back so he is going to have an ultrasound on 3/13/19.    Shortness of breath      Past Surgical History:   Procedure Laterality Date    ABDOMINAL SURGERY      BRONCHOSCOPY N/A 10/24/2018    Procedure: BRONCHOSCOPY WITH BIOPSY, EBUS;  Surgeon: Gareth Becerra MD;  Location: [facility] OR;  Service: Pulmonary    CHOLECYSTECTOMY      COLONOSCOPY N/A 1/3/2019    Procedure: COLONOSCOPY WITH ANESTHESIA;  Surgeon: Randy Somers DO;  Location: [facility] ENDOSCOPY;  Service: Gastroenterology    CYSTOSCOPY, RETROGRADE PYELOGRAM AND STENT INSERTION Left 3/8/2019    Procedure: CYSTOSCOPY RETROGRADE BILATERAL PYELOGRAM;  Surgeon: Jos Sylvester MD;  Location: [facility] OR;  Service: Urology    ENDOSCOPY N/A 12/11/2018    Procedure: ESOPHAGOGASTRODUODENOSCOPY WITH ANESTHESIA;  Surgeon: Randy Somers DO;  Location: [facility] ENDOSCOPY;  Service: Gastroenterology    ENDOSCOPY N/A 4/29/2019    Procedure: ESOPHAGOGASTRODUODENOSCOPY WITH ANESTHESIA;  Surgeon: Lilliam Jj MD;  Location: [facility] OR;  Service: Gastroenterology    ENDOSCOPY N/A 5/9/2019    Procedure: ESOPHAGOGASTRODUODENOSCOPY WITH ANESTHESIA;  Surgeon: Pilo Bansal MD;  Location: [facility] ENDOSCOPY;  Service: Gastroenterology    EYE SURGERY Left 1964    FOOT SURGERY Right 1966    joint    FRACTURE SURGERY       PT Assessment (Last 12 Hours)       PT Evaluation and Treatment       Row Name 07/03/24 3182          Physical  Therapy Time and Intention    Subjective Information complains of;pain  -AE     Document Type therapy note (daily note)  -AE     Mode of Treatment physical therapy  -AE       Row Name 07/03/24 1510          General Information    Existing Precautions/Restrictions fall  -AE       Row Name 07/03/24 1510          Pain    Pretreatment Pain Rating 0/10 - no pain  -AE     Posttreatment Pain Rating 0/10 - no pain  -AE       Row Name 07/03/24 1510          Bed Mobility    Comment, (Bed Mobility) up in chair  -AE       Row Name 07/03/24 1510          Sit-Stand Transfer    Sit-Stand [place] (Transfers) standby assist  -AE       Row Name 07/03/24 1510          Stand-Sit Transfer    Stand-Sit [place] (Transfers) standby assist  -AE       Row Name 07/03/24 1510          Gait/Stairs (Locomotion)    [place] Level (Gait) contact guard  -AE     Distance in Feet (Gait) 150  -AE     Deviations/Abnormal Patterns (Gait) stride length decreased  -AE       Row Name 07/03/24 1510          Vital Signs    O2 Delivery Pre Treatment supplemental O2  -AE     Intra SpO2 (%) 86  -AE     O2 Delivery Intra Treatment room air  -AE     Post SpO2 (%) 93  returned to 93  -AE     O2 Delivery Post Treatment room air  -AE       Row Name 07/03/24 1510          Positioning and Restraints    Pre-Treatment Position sitting in chair/recliner  -AE     Post Treatment Position bathroom  -AE     Bathroom sitting;call light within reach  -AE               User Key  (r) = Recorded By, (t) = Taken By, (c) = Cosigned By      Initials Name Provider Type    AE Conchita Cunningham, PTA Physical Therapist Assistant                    Physical Therapy Education       Title: PT OT SLP Therapies (In Progress)       Topic: Physical Therapy (In Progress)       Point: Mobility training (Done)       Learning Progress Summary             Patient Acceptance, E, VU,NR by BOB at 7/2/2024 1031                         Point: Home exercise program (Not Started)       Learner  Progress:  Not documented in this visit.              Point: Body mechanics (Done)       Learning Progress Summary             Patient Acceptance, E, VU,NR by KR at 7/2/2024 1031                         Point: Precautions (Done)       Learning Progress Summary             Patient Acceptance, E, VU,NR by KR at 7/2/2024 1031                                         User Key       Initials Effective Dates Name Provider Type Discipline     06/03/24 -  Magda Reddy, PT DPT Physical Therapist PT                  PT Recommendation and Plan     Plan of Care Reviewed With: patient  Progress: improving  Outcome Evaluation: Pt up in chair with no complaints.Pt removed 02 and stated that he wears 02 2.5L at home as needed but not all the time.Pt was SBA to stand and cga to walk 180ft in hallway 02 decreased to 86% but pt recoverd to 90's with seated rest.Pt went to bathroom on room air and when he returned 02 93%.Pt donned 2.5L again.Pt progressing well.       Time Calculation:    PT Charges       Row Name 07/03/24 1535             Time Calculation    Start Time 1510  -AE      Stop Time 1533  -AE      Time Calculation (min) 23 min  -AE      PT Received On 07/03/24  -AE      PT Goal Re-Cert Due Date 07/12/24  -AE         Time Calculation- PT    Total Timed Code Minutes- PT 23 minute(s)  -AE         Timed Charges    81039 - Gait Training Minutes  15  -AE      28596 - PT Therapeutic Activity Minutes 8  -AE         Total Minutes    Timed Charges Total Minutes 23  -AE       Total Minutes 23  -AE                User Key  (r) = Recorded By, (t) = Taken By, (c) = Cosigned By      Initials Name Provider Type    AE Conchita Cunningham PTA Physical Therapist Assistant                  Therapy Charges for Today       Code Description Service Date Service Provider Modifiers Qty    16673185766 HC GAIT TRAINING EA 15 MIN 7/3/2024 Conchita Cunningham, PTA GP 1    48290109550 HC PT THERAPEUTIC ACT EA 15 MIN 7/3/2024 Conchita Cunningham, SHIMA GP 1             PT G-Codes  Outcome Measure Options: AM-PAC 6 Clicks Daily Activity (OT)  AM-PAC 6 Clicks Score (PT): 23  AM-PAC 6 Clicks Score (OT): 24    Conchita Cunningham, PTA  7/3/2024

## 2024-07-03 NOTE — NURSING NOTE
PT had a run of v tach and HR got up to 197, then went into afib rvr, then converted back to sinus. PT has got up to use the restroom when the occurred, but had no complaint of chest pain. Dr. Peñaloza was notified and she said check mag and k and consult cardio.

## 2024-07-03 NOTE — PROGRESS NOTES
I have reviewed the CT scan of the thorax on this admission for atrial fibrillation with rapid ventricular response.    He completed recent SBRT radiotherapy for 2 right lung nodules on February 7, 2024.    Review of the current CT scan appears consistent with postradiation change.  We cannot entirely rule out progression however this appears unlikely with the chronology and radiographic appearance.    He is scheduled to return to our department in approximately 1 month with follow-up CT scan of the thorax and we will follow this closely with serial CT scans of the thorax.

## 2024-07-03 NOTE — PROGRESS NOTES
"[facility] -  Progress Note     LOS: 4 days   Patient Care Team:  Irving Alexis MD as PCP - General (General Practice)  Gareth Becerra MD as Consulting Physician (Pulmonary Disease)  Randy Somers DO as Consulting Physician (Gastroenterology)  Jos Sylvester MD as Consulting Physician (Urology)  Jeff Michele MD as Consulting Physician (Oncology)    Chief Complaint: Chest Pain     Subjective     Interval History:   He notes he got up last night in the middle of the night to adjust his covers. He notes that his heart starting racing at that time. He notes he was aware. He notes that he could tell his heart was racing but otherwise felt okay. He does note he has had a few other episodes of this today. Denies chest pain. He notes \"I was really hoping I was going to get to go home.\"      Review of Systems:     Review of Systems   Constitutional:  Positive for fatigue.   Respiratory:  Positive for shortness of breath.    Cardiovascular:  Positive for palpitations.     Objective     Vital Sign Min/Max for last 24 hours  Temp  Min: 97.6 °F (36.4 °C)  Max: 98 °F (36.7 °C)   BP  Min: 100/71  Max: 153/88   Pulse  Min: 87  Max: 106   Resp  Min: 16  Max: 18   SpO2  Min: 92 %  Max: 98 %   No data recorded   No data recorded         07/01/24  0901   Weight: 75.5 kg (166 lb 7.2 oz)       Telemetry: NSR- frequent runs of wide complex tachycardia. Overnight with narrow complex tachycardia and wide complex tachycardia concerning for V-Tach.      Physical Exam:    Constitutional:       Appearance: Well-developed. Frail. Chronically ill-appearing.   Eyes:      Pupils: Pupils are equal, round, and reactive to light.   HENT:      Head: Normocephalic and atraumatic.   Neck:      Vascular: No carotid bruit or JVD.   Pulmonary:      Effort: Pulmonary effort is normal.      Breath sounds: Normal breath sounds.   Cardiovascular:      Normal rate. Regular rhythm.   Pulses:     Intact " distal pulses.   Edema:     Peripheral edema absent.   Abdominal:      General: Bowel sounds are normal.      Palpations: Abdomen is soft.   Musculoskeletal: Normal range of motion.      Cervical back: Normal range of motion and neck supple. Skin:     General: Skin is warm and dry.   Neurological:      Mental Status: Alert and oriented to person, place, and time.      Deep Tendon Reflexes: Reflexes are normal and symmetric.   Psychiatric:         Behavior: Behavior normal.         Thought Content: Thought content normal.         Judgment: Judgment normal.       Results Review:   Lab Results (last 72 hours)       Procedure Component Value Units Date/Time    Blood Culture - Blood, Arm, Right [[id number]]  (Normal) Collected: 06/29/24 1105    Specimen: Blood from Arm, Right Updated: 07/03/24 1130     Blood Culture No growth at 4 days    Blood Culture - Blood, Arm, Right [[id number]]  (Normal) Collected: 06/29/24 1027    Specimen: Blood from Arm, Right Updated: 07/03/24 1130     Blood Culture No growth at 4 days    Basic Metabolic Panel [[id number]]  (Abnormal) Collected: 07/03/24 0405    Specimen: Blood Updated: 07/03/24 0616     Glucose 110 mg/dL      BUN 46 mg/dL      Creatinine 2.02 mg/dL      Sodium 137 mmol/L      Potassium 4.6 mmol/L      Chloride 105 mmol/L      CO2 21.0 mmol/L      Calcium 10.2 mg/dL      BUN/Creatinine Ratio 22.8     Anion Gap 11.0 mmol/L      eGFR 32.3 mL/min/1.73     Narrative:      GFR Normal >60  Chronic Kidney Disease <60  Kidney Failure <15    The GFR formula is only valid for adults with stable renal function between ages 18 and 70.    Magnesium [[id number]]  (Normal) Collected: 07/03/24 0405    Specimen: Blood Updated: 07/03/24 0616     Magnesium 1.8 mg/dL     CBC (No Diff) [[id number]]  (Abnormal) Collected: 07/03/24 0405    Specimen: Blood Updated: 07/03/24 0544     WBC 10.33 10*3/mm3      RBC 4.31 10*6/mm3      Hemoglobin 13.3 g/dL      Hematocrit 40.6 %      MCV 94.2 fL      MCH 30.9 pg       MCHC 32.8 g/dL      RDW 16.8 %      RDW-SD 57.6 fl      MPV 12.0 fL      Platelets 176 10*3/mm3     Comprehensive Metabolic Panel [[id number]]  (Abnormal) Collected: 07/02/24 0109    Specimen: Blood Updated: 07/02/24 0149     Glucose 136 mg/dL      BUN 45 mg/dL      Creatinine 2.05 mg/dL      Sodium 137 mmol/L      Potassium 5.8 mmol/L      Chloride 106 mmol/L      CO2 21.0 mmol/L      Calcium 10.2 mg/dL      Total Protein 7.0 g/dL      Albumin 3.6 g/dL      ALT (SGPT) 21 U/L      AST (SGOT) 24 U/L      Alkaline Phosphatase 113 U/L      Total Bilirubin 0.3 mg/dL      Globulin 3.4 gm/dL      A/G Ratio 1.1 g/dL      BUN/Creatinine Ratio 22.0     Anion Gap 10.0 mmol/L      eGFR 31.8 mL/min/1.73     Narrative:      GFR Normal >60  Chronic Kidney Disease <60  Kidney Failure <15    The GFR formula is only valid for adults with stable renal function between ages 18 and 70.    Lipid Panel [[id number]]  (Abnormal) Collected: 07/02/24 0109    Specimen: Blood Updated: 07/02/24 0149     Total Cholesterol 115 mg/dL      Triglycerides 143 mg/dL      HDL Cholesterol 35 mg/dL      LDL Cholesterol  55 mg/dL      VLDL Cholesterol 25 mg/dL      LDL/HDL Ratio 1.47    Narrative:      Cholesterol Reference Ranges  (U.S. Department of Health and Human Services ATP III Classifications)    Desirable          <200 mg/dL  Borderline High    200-239 mg/dL  High Risk          >240 mg/dL      Triglyceride Reference Ranges  (U.S. Department of Health and Human Services ATP III Classifications)    Normal           <150 mg/dL  Borderline High  150-199 mg/dL  High             200-499 mg/dL  Very High        >500 mg/dL    HDL Reference Ranges  (U.S. Department of Health and Human Services ATP III Classifications)    Low     <40 mg/dl (major risk factor for CHD)  High    >60 mg/dl ('negative' risk factor for CHD)        LDL Reference Ranges  (U.S. Department of Health and Human Services ATP III Classifications)    Optimal          <100 mg/dL  Near  Optimal     100-129 mg/dL  Borderline High  130-159 mg/dL  High             160-189 mg/dL  Very High        >189 mg/dL    CBC & Differential [[id number]]  (Abnormal) Collected: 07/02/24 0109    Specimen: Blood Updated: 07/02/24 0144    Narrative:      The following orders were created for panel order CBC & Differential.  Procedure                               Abnormality         Status                     ---------                               -----------         ------                     CBC Auto Differential[[id number]]        Abnormal            Final result                 Please view results for these tests on the individual orders.    CBC Auto Differential [[id number]]  (Abnormal) Collected: 07/02/24 0109    Specimen: Blood Updated: 07/02/24 0144     WBC 8.67 10*3/mm3      RBC 4.57 10*6/mm3      Hemoglobin 13.9 g/dL      Hematocrit 43.4 %      MCV 95.0 fL      MCH 30.4 pg      MCHC 32.0 g/dL      RDW 16.9 %      RDW-SD 58.4 fl      MPV 11.6 fL      Platelets 187 10*3/mm3      Neutrophil % 89.4 %      Lymphocyte % 4.8 %      Monocyte % 4.0 %      Eosinophil % 0.0 %      Basophil % 0.1 %      Immature Grans % 1.7 %      Neutrophils, Absolute 7.74 10*3/mm3      Lymphocytes, Absolute 0.42 10*3/mm3      Monocytes, Absolute 0.35 10*3/mm3      Eosinophils, Absolute 0.00 10*3/mm3      Basophils, Absolute 0.01 10*3/mm3      Immature Grans, Absolute 0.15 10*3/mm3      nRBC 0.0 /100 WBC     Hemoglobin A1c [[id number]]  (Abnormal) Collected: 07/02/24 0109    Specimen: Blood Updated: 07/02/24 0133     Hemoglobin A1C 6.50 %     Narrative:      Hemoglobin A1C Ranges:    Increased Risk for Diabetes  5.7% to 6.4%  Diabetes                     >= 6.5%  Diabetic Goal                < 7.0%                Echo EF Estimated  Results for orders placed during the hospital encounter of 06/29/24    Adult Stress Echo W/ Cont or Stress Agent if Necessary Per Protocol    Interpretation Summary    Overall, this is an intermediate risk test  for ischemia.    No ECG evidence of myocardial ischemia. Negative clinical evidence of myocardial ischemia. Findings consistent with a normal ECG stress test.    Abnormal stress echo consistent with an intermediate risk study for myocardial ischemia.    Left ventricular systolic function is normal. Left ventricular ejection fraction appears to be 61 - 65%.    The following left ventricular wall segments are hypokinetic: apical septal and apex hypokinetic.       Medication Review: yes  Current Facility-Administered Medications   Medication Dose Route Frequency Provider Last Rate Last Admin    acetaminophen (TYLENOL) tablet 650 mg  650 mg Oral Q4H PRN Wilber Carbajal MD        Or    acetaminophen (TYLENOL) 160 MG/5ML oral solution 650 mg  650 mg Oral Q4H PRN Wilber Carbajal MD        Or    acetaminophen (TYLENOL) suppository 650 mg  650 mg Rectal Q4H PRN Wilber Carbajal MD        acetaminophen (TYLENOL) tablet 1,000 mg  1,000 mg Oral Nightly Wilber Carbajal MD   1,000 mg at 07/02/24 2203    allopurinol (ZYLOPRIM) tablet 100 mg  100 mg Oral Daily Wilber Carbajal MD   100 mg at 07/03/24 0906    aspirin EC tablet 81 mg  81 mg Oral Daily Wilber Carbajal MD   81 mg at 07/03/24 0906    atorvastatin (LIPITOR) tablet 20 mg  20 mg Oral Nightly Wilber Carbajal MD   20 mg at 07/02/24 2204    sennosides-docusate (PERICOLACE) 8.6-50 MG per tablet 2 tablet  2 tablet Oral Daily Zachary Lloyd MD   2 tablet at 07/03/24 0914    And    polyethylene glycol (MIRALAX) packet 17 g  17 g Oral Daily PRN Zachary Lloyd MD        And    bisacodyl (DULCOLAX) EC tablet 5 mg  5 mg Oral Daily PRN Zachary Lloyd MD        And    bisacodyl (DULCOLAX) suppository 10 mg  10 mg Rectal Daily PRN Zachary Lloyd MD        Enoxaparin Sodium (LOVENOX) syringe 30 mg  30 mg Subcutaneous Daily Wilber Carbajal MD   30 mg at 07/03/24 0906    fluticasone (FLONASE) 50 MCG/ACT nasal spray 1  spray  1 spray Nasal Daily Wilber Carbajal MD   1 spray at 07/03/24 0905    isosorbide mononitrate (IMDUR) 24 hr tablet 30 mg  30 mg Oral Q24H Aristides Padilla APRN   30 mg at 07/03/24 0906    losartan (COZAAR) tablet 50 mg  50 mg Oral Daily Zachary Lloyd MD   50 mg at 07/03/24 1245    meclizine (ANTIVERT) tablet 12.5 mg  12.5 mg Oral TID PRN Wilber Carbajal MD        melatonin tablet 5 mg  5 mg Oral Nightly Zachary Lloyd MD        metoprolol tartrate (LOPRESSOR) tablet 25 mg  25 mg Oral Q12H Zachary Lloyd MD        montelukast (SINGULAIR) tablet 10 mg  10 mg Oral Nightly Wilber Carbajal MD   10 mg at 07/02/24 2204    multivitamin with minerals 1 tablet  1 tablet Oral Daily Wilber Carbajal MD   1 tablet at 07/03/24 0907    pantoprazole (PROTONIX) EC tablet 20 mg  20 mg Oral Daily Zachary Lloyd MD   20 mg at 07/03/24 0907    [START ON 7/4/2024] predniSONE (DELTASONE) tablet 20 mg  20 mg Oral Daily With Breakfast Zachary Lloyd MD        sodium bicarbonate tablet 650 mg  650 mg Oral TID Wilber Carbajal MD   650 mg at 07/03/24 0907    sodium chloride 0.9 % flush 10 mL  10 mL Intravenous PRN Wilber Carbajal MD        sodium chloride 0.9 % flush 10 mL  10 mL Intravenous Q12H Wilber Carbajal MD   10 mL at 07/03/24 0915    sodium chloride 0.9 % flush 10 mL  10 mL Intravenous PRN Wilber Carbajal MD        sodium chloride 0.9 % infusion 40 mL  40 mL Intravenous PRN Wilber Carbajal MD        tamsulosin (FLOMAX) 24 hr capsule 0.4 mg  0.4 mg Oral Nightly Wilber Carbajal MD   0.4 mg at 07/02/24 2204    traMADol (ULTRAM) tablet 50 mg  50 mg Oral Q6H PRN Wilber Carbajal MD             Assessment & Plan       Chest pain    Dyspnea    Essential hypertension    Malignant neoplasm of upper lobe of right lung    Stage 3 chronic kidney disease    Adenocarcinoma, lung    Fibrotic lung diseases    Acute renal failure superimposed on  chronic kidney disease    GERD without esophagitis    Former smoker    Tachycardia    Plan:  Tachycardia- appears to have intermittent episodes of V-tach. Patient had an episodes last evening with narrow complex tachycardia and wide complex tachycardia. Continues to have intermittent episodes of wide complex tachycardia. Patient is aware of heart racing. Toprol switched to Lopressor by Dr. Lloyd and he ordered an echo. Magnesium normal and potassium normal. I reviewed with Dr. Gordon- will consult Dr. Bowen. Patient had V-tach in 2019 at time of procedure at [facility] requiring intubation.     Chest Pain- atypical pain has resolved. Intermediate stress. Noted with mild coronary artery disease on last cath- at time of first diagnosis of V-tach. Initially had planned for medical management of chest pain and intermediate stress. Consulting Dr. Bowen for tachycardia.     Stage IV metastatic lung cancer- follows with Dr. 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

## 2024-07-03 NOTE — PLAN OF CARE
Goal Outcome Evaluation:  Plan of Care Reviewed With: patient           Outcome Evaluation: OT tx completed. Pt presents alert and oriented x4, sitting up in bed. He reports resting well last night and agreeable to therapy this am. Pt was able to perform bed mobility with Mod I. Donned socks independently while seated at EOB unsupported. SBA for all sit <> stand t/fs and in room mobility with use of rwx. He was able to complete toileting tasks with Mod I. Stood sink side for 10 minutes in unsupported standing and completed all grooming tasks independently. He was able to amb back to chair. Pt has achieved all OT goals this date.      Anticipated Discharge Disposition (OT): home

## 2024-07-03 NOTE — THERAPY DISCHARGE NOTE
Acute Care - Occupational Therapy Discharge  [facility]    Patient Name: Karoline Prater  : 1940    MRN: [id number]                              Today's Date: 7/3/2024       Admit Date: 2024    Visit Dx:     ICD-10-CM ICD-9-CM   1. Pneumonia of right lung due to infectious organism, unspecified part of lung  J18.9 483.8   2. Pulmonary fibrosis  J84.10 515   3. Cardiomegaly  I51.7 429.3   4. Acute kidney injury superimposed on chronic kidney disease  N17.9 584.9    N18.9 585.9   5. Elevated troponin  R79.89 790.6     Patient Active Problem List   Diagnosis    Dyspnea    Essential hypertension    NSVT (nonsustained ventricular tachycardia)    Malignant neoplasm of upper lobe of right lung    Stage 3 chronic kidney disease    Adenocarcinoma, lung    Pancytopenia due to antineoplastic chemotherapy    Pneumonia due to infectious organism    Function kidney decreased    Cellulitis    Acute respiratory failure with hypoxia    Fibrotic lung diseases    Macrocytic anemia    Thrombocytopenia    Sepsis    Right pneumothorax    Hyperkalemia    Acute renal failure superimposed on chronic kidney disease    GERD without esophagitis    A-fib    Former smoker    Chest pain     Past Medical History:   Diagnosis Date    Arthritis     Atrial fibrillation with rapid ventricular response 2019    Blindness of left eye     BPH with obstruction/lower urinary tract symptoms     Cancer     stomach & lung    CHF (congestive heart failure)     CKD (chronic kidney disease) stage 3, GFR 30-59 ml/min     Coronary artery disease     Elevated cholesterol     Essential hypertension 10/02/2017    Fibrotic lung diseases     GERD (gastroesophageal reflux disease)     Hearing loss     History of transfusion     Hydronephrosis of left kidney     Hyperlipidemia     Hypertension     pt was taken off of all of his medications for BP (atenolol, lisinopril, lasix) because his BP kept bottoming out so his primary dr told him to  discontinue them 1-2 months ago (Jan/Feb 2019). pt states he takes no medications currently.    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 Patel is just going to keep an eye on it and not do surgery right now.    Mediastinal adenopathy 10/24/2018    [facility]    Monoclonal gammopathy of unknown significance (MGUS) 09/11/2018    Pancreatic mass     pt states he had this in 2013 but it went away on its own. Now recent CT shows it has come back so he is going to have an ultrasound on 3/13/19.    Shortness of breath      Past Surgical History:   Procedure Laterality Date    ABDOMINAL SURGERY      BRONCHOSCOPY N/A 10/24/2018    Procedure: BRONCHOSCOPY WITH BIOPSY, EBUS;  Surgeon: Gareth Becerra MD;  Location: [facility] OR;  Service: Pulmonary    CHOLECYSTECTOMY      COLONOSCOPY N/A 1/3/2019    Procedure: COLONOSCOPY WITH ANESTHESIA;  Surgeon: Randy Somers DO;  Location: [facility] ENDOSCOPY;  Service: Gastroenterology    CYSTOSCOPY, RETROGRADE PYELOGRAM AND STENT INSERTION Left 3/8/2019    Procedure: CYSTOSCOPY RETROGRADE BILATERAL PYELOGRAM;  Surgeon: Jos Sylvester MD;  Location: [facility] OR;  Service: Urology    ENDOSCOPY N/A 12/11/2018    Procedure: ESOPHAGOGASTRODUODENOSCOPY WITH ANESTHESIA;  Surgeon: Randy Somers DO;  Location: [facility] ENDOSCOPY;  Service: Gastroenterology    ENDOSCOPY N/A 4/29/2019    Procedure: ESOPHAGOGASTRODUODENOSCOPY WITH ANESTHESIA;  Surgeon: Lilliam Jj MD;  Location: [facility] OR;  Service: Gastroenterology    ENDOSCOPY N/A 5/9/2019    Procedure: ESOPHAGOGASTRODUODENOSCOPY WITH ANESTHESIA;  Surgeon: Pilo Bansal MD;  Location: [facility] ENDOSCOPY;  Service: Gastroenterology    EYE SURGERY Left 1964    FOOT SURGERY Right 1966    joint    FRACTURE SURGERY        General Information       Row Name 07/03/24 0733          OT  Time and Intention    Document Type therapy note (daily note)  -     Mode of Treatment occupational therapy  -       Row Name 07/03/24 0733          General Information    Patient Profile Reviewed yes  -     Existing Precautions/Restrictions fall  -       Row Name 07/03/24 0733          Cognition    Orientation Status (Cognition) oriented x 4  -       Row Name 07/03/24 0733          Safety Issues, Functional Mobility    Impairments Affecting Function (Mobility) balance;endurance/activity tolerance;strength;shortness of breath  -               User Key  (r) = Recorded By, (t) = Taken By, (c) = Cosigned By      Initials Name Provider Type     Betsey Arias, OTR/L, CSRS Occupational Therapist                   Mobility/ADL's       Row Name 07/03/24 0733          Bed Mobility    Bed Mobility supine-sit  -     Supine-Sit [place] (Bed Mobility) modified independence  -     Assistive Device (Bed Mobility) bed rails;head of bed elevated  -       Row Name 07/03/24 0733          Transfers    Transfers sit-stand transfer;stand-sit transfer;toilet transfer  -       Row Name 07/03/24 0733          Sit-Stand Transfer    Sit-Stand [place] (Transfers) standby assist  -       Row Name 07/03/24 0733          Stand-Sit Transfer    Stand-Sit [place] (Transfers) standby assist  -       Row Name 07/03/24 0733          Toilet Transfer    Type (Toilet Transfer) sit-stand;stand-sit  -     [place] Level (Toilet Transfer) modified independence  -     Assistive Device (Toilet Transfer) commode;grab bars/safety frame  -       Row Name 07/03/24 0733          Functional Mobility    Functional Mobility- Ind. Level standby assist  -     Functional Mobility- Device walker, front-wheeled  -     Functional Mobility- Comment amb from bed to bathroom and then to chair  -       Row Name 07/03/24 0733          Activities of Daily Living    BADL Assessment/Intervention  toileting;grooming;feeding;lower body dressing  -J       Row Name 07/03/24 0733          Lower Body Dressing Assessment/Training    [place] Level (Lower Body Dressing) lower body dressing skills;independent  -JJ     Position (Lower Body Dressing) unsupported sitting;unsupported standing  -J       Row Name 07/03/24 0733          Toileting Assessment/Training    [place] Level (Toileting) toileting skills;independent  -JJ     Assistive Devices (Toileting) commode;grab bar/safety frame  -JJ     Position (Toileting) unsupported standing  -JJ       Row Name 07/03/24 0733          Grooming Assessment/Training    [place] Level (Grooming) shave face;oral care regimen;wash face, hands;modified independence  -JJ     Position (Grooming) sink side;unsupported standing  -J       Row Name 07/03/24 0733          Self-Feeding Assessment/Training    [place] Level (Feeding) feeding skills;independent  -JJ     Position (Self-Feeding) unsupported sitting  -JJ               User Key  (r) = Recorded By, (t) = Taken By, (c) = Cosigned By      Initials Name Provider Type    Betsey Birmingham, OTR/L, CSRS Occupational Therapist                   Obj/Interventions    No documentation.                  Goals/Plan       Row Name 07/03/24 0733          Transfer Goal 1 (OT)    Activity/Assistive Device (Transfer Goal 1, OT) toilet;shower chair  -JJ     [place] Level/Cues Needed (Transfer Goal 1, OT) modified independence  -JJ     Time Frame (Transfer Goal 1, OT) long term goal (LTG);10 days  -JJ     Progress/Outcome (Transfer Goal 1, OT) goal met  -JJ       Row Name 07/03/24 0733          Bathing Goal 1 (OT)    Activity/Device (Bathing Goal 1, OT) bathing skills, all  -JJ     [place] Level/Cues Needed (Bathing Goal 1, OT) modified independence  -JJ     Time Frame (Bathing Goal 1, OT) long term goal (LTG);10 days  -JJ     Strategies/Barriers (Bathing Goal 1, OT) While maintaining O2 sats above 90%.  -      Progress/Outcomes (Bathing Goal 1, OT) goal met  -JJ       Row Name 07/03/24 0733          Dressing Goal 1 (OT)    Activity/Device (Dressing Goal 1, OT) dressing skills, all  -JJ     [place]/Cues Needed (Dressing Goal 1, OT) modified independence  -JJ     Time Frame (Dressing Goal 1, OT) long term goal (LTG);10 days  -J     Progress/Outcome (Dressing Goal 1, OT) goal met  -J               User Key  (r) = Recorded By, (t) = Taken By, (c) = Cosigned By      Initials Name Provider Type    Betsey Birmingham, OTR/L, CSRS Occupational Therapist                   Clinical Impression       Row Name 07/03/24 0733          Pain Assessment    Pretreatment Pain Rating 0/10 - no pain  -JJ     Posttreatment Pain Rating 0/10 - no pain  -J     Pain Intervention(s) Medication (See MAR);Repositioned;Ambulation/increased activity  -       Row Name 07/03/24 0733          Plan of Care Review    Plan of Care Reviewed With patient  -J     Outcome Evaluation OT tx completed. Pt presents alert and oriented x4, sitting up in bed. He reports resting well last night and agreeable to therapy this am. Pt was able to perform bed mobility with Mod I. Donned socks independently while seated at EOB unsupported. SBA for all sit <> stand t/fs and in room mobility with use of rwx. He was able to complete toileting tasks with Mod I. Stood sink side for 10 minutes in unsupported standing and completed all grooming tasks independently. He was able to amb back to chair. Pt has achieved all OT goals this date.  -J       Row Name 07/03/24 0733          Therapy Plan Review/Discharge Plan (OT)    Anticipated Discharge Disposition (OT) home  -       Row Name 07/03/24 0733          Positioning and Restraints    Pre-Treatment Position in bed  -JJ     Post Treatment Position chair  -JJ     In Chair notified nsg;reclined;call light within reach;encouraged to call for assist  -J               User Key  (r) = Recorded By, (t) = Taken By, (c) =  Cosigned By      Initials Name Provider Type    NATALIEBetsey Reyna OTR/L, CSRS Occupational Therapist                   Outcome Measures       Row Name 07/03/24 0733          How much help from another is currently needed...    Putting on and taking off regular lower body clothing? 4  -JJ     Bathing (including washing, rinsing, and drying) 4  -JJ     Toileting (which includes using toilet bed pan or urinal) 4  -JJ     Putting on and taking off regular upper body clothing 4  -JJ     Taking care of personal grooming (such as brushing teeth) 4  -JJ     Eating meals 4  -JJ     AM-PAC 6 Clicks Score (OT) 24  -JJ       Row Name 07/03/24 0733          Functional Assessment    Outcome Measure Options AM-PAC 6 Clicks Daily Activity (OT)  -JJ               User Key  (r) = Recorded By, (t) = Taken By, (c) = Cosigned By      Initials Name Provider Type    NATALIEBetsey Reyna OTR/L, CSRS Occupational Therapist                  Occupational Therapy Education       Title: PT OT SLP Therapies (In Progress)       Topic: Occupational Therapy (In Progress)       Point: ADL training (Done)       Description:   Instruct learner(s) on proper safety adaptation and remediation techniques during self care or transfers.   Instruct in proper use of assistive devices.                  Learning Progress Summary             Patient Acceptance, E, VU by LS at 7/1/2024 1332                         Point: Home exercise program (Not Started)       Description:   Instruct learner(s) on appropriate technique for monitoring, assisting and/or progressing therapeutic exercises/activities.                  Learner Progress:  Not documented in this visit.              Point: Precautions (Done)       Description:   Instruct learner(s) on prescribed precautions during self-care and functional transfers.                  Learning Progress Summary             Patient Acceptance, E, VU by LS at 7/1/2024 1332                         Point: Body mechanics  (Done)       Description:   Instruct learner(s) on proper positioning and spine alignment during self-care, functional mobility activities and/or exercises.                  Learning Progress Summary             Patient Acceptance, E, VU by ONDINA at 7/1/2024 4082                                         User Key       Initials Effective Dates Name Provider Type Discipline    ONDINA 06/20/22 -  Susanne Lima, OTR/L Occupational Therapist OT                  OT Recommendation and Plan     Plan of Care Review  Plan of Care Reviewed With: patient  Outcome Evaluation: OT tx completed. Pt presents alert and oriented x4, sitting up in bed. He reports resting well last night and agreeable to therapy this am. Pt was able to perform bed mobility with Mod I. Donned socks independently while seated at EOB unsupported. SBA for all sit <> stand t/fs and in room mobility with use of rwx. He was able to complete toileting tasks with Mod I. Stood sink side for 10 minutes in unsupported standing and completed all grooming tasks independently. He was able to amb back to chair. Pt has achieved all OT goals this date.  Plan of Care Reviewed With: patient  Outcome Evaluation: OT tx completed. Pt presents alert and oriented x4, sitting up in bed. He reports resting well last night and agreeable to therapy this am. Pt was able to perform bed mobility with Mod I. Donned socks independently while seated at EOB unsupported. SBA for all sit <> stand t/fs and in room mobility with use of rwx. He was able to complete toileting tasks with Mod I. Stood sink side for 10 minutes in unsupported standing and completed all grooming tasks independently. He was able to amb back to chair. Pt has achieved all OT goals this date.     Time Calculation:         Time Calculation- OT       Row Name 07/03/24 0733             Time Calculation- OT    OT Start Time 0733  -JJ      OT Stop Time 0812  -JJ      OT Time Calculation (min) 39 min  -JJ      Total Timed Code  Minutes- OT 39 minute(s)  -JJ      OT Received On 07/03/24  -JJ         Timed Charges    92174 - OT Self Care/Mgmt Minutes 39  -JJ         Total Minutes    Timed Charges Total Minutes 39  -JJ       Total Minutes 39  -JJ                User Key  (r) = Recorded By, (t) = Taken By, (c) = Cosigned By      Initials Name Provider Type    Betsey Birmingham, SUNITHA/L, CSRS Occupational Therapist                  Therapy Charges for Today       Code Description Service Date Service Provider Modifiers Qty    58786092307 HC OT SELF CARE/MGMT/TRAIN EA 15 MIN 7/3/2024 Betsey Arias OTR/L, JULES GO 3               OT Discharge Summary  Anticipated Discharge Disposition (OT): home  Reason for Discharge: All goals achieved  Outcomes Achieved: Able to achieve all goals within established timeline  Discharge Destination: Home    SUNITHA Acuña/L, CSRS  7/3/2024

## 2024-07-03 NOTE — PLAN OF CARE
Goal Outcome Evaluation:  Plan of Care Reviewed With: patient        Progress: improving  Outcome Evaluation: Pt up in chair with no complaints.Pt removed 02 and stated that he wears 02 2.5L at home as needed but not all the time.Pt was SBA to stand and cga to walk 180ft in hallway 02 decreased to 86% but pt recoverd to 90's with seated rest.Pt went to bathroom on room air and when he returned 02 93%.Pt donned 2.5L again.Pt progressing well.

## 2024-07-03 NOTE — PROGRESS NOTES
[facility] Medicine Services  INPATIENT PROGRESS NOTE    Patient Name: Karoline Prater  Date of Admission: 6/29/2024  Today's Date: 07/03/24  Length of Stay: 4  Primary Care Physician: Irving Alexis MD    Subjective   Chief Complaint: Renal failure/tachycardia.  HPI   Tachycardia questionable atrial fibs, EKG, patient is asymptomatic, change Toprol to Lopressor twice a day higher dose.  Put patient back on Cozaar.  Echocardiogram . creatinine stable.  White blood cells normal.  Hemoglobin stable.  Patient is on 2 L of oxygen, his baseline.  Continue to wean down steroid.    Review of Systems   Constitutional:  Positive for activity change, appetite change and fatigue. Negative for chills and fever.   HENT:  Negative for hearing loss, nosebleeds, tinnitus and trouble swallowing.    Eyes:  Negative for visual disturbance.   Respiratory:  Positive for shortness of breath. Negative for cough, chest tightness and wheezing.    Cardiovascular:  Negative for chest pain, palpitations and leg swelling.   Gastrointestinal:  Negative for abdominal distention, abdominal pain, blood in stool, constipation, diarrhea, nausea and vomiting.   Endocrine: Negative for cold intolerance, heat intolerance, polydipsia, polyphagia and polyuria.   Genitourinary:  Negative for decreased urine volume, difficulty urinating, dysuria, flank pain, frequency and hematuria.   Musculoskeletal:  Positive for arthralgias, gait problem and myalgias. Negative for joint swelling.   Skin:  Negative for rash.   Allergic/Immunologic: Negative for immunocompromised state.   Neurological:  Positive for weakness. Negative for dizziness, syncope, light-headedness and headaches.   Hematological:  Negative for adenopathy. Does not bruise/bleed easily.   Psychiatric/Behavioral:  Negative for confusion and sleep disturbance. The patient is not nervous/anxious.    All pertinent negatives and positives are as above.  All other systems have been reviewed and are negative unless otherwise stated.     Objective    Temp:  [97.3 °F (36.3 °C)-98 °F (36.7 °C)] 97.6 °F (36.4 °C)  Heart Rate:  [] 94  Resp:  [16-18] 18  BP: (100-153)/(71-88) 153/88  Physical Exam  Vitals and nursing note reviewed.   Constitutional:       Comments: Advanced age.  Chronically ill.   HENT:      Head: Normocephalic and atraumatic.   Eyes:      Conjunctiva/sclera: Conjunctivae normal.      Pupils: Pupils are equal, round, and reactive to light.      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   Cardiovascular:      Rate and Rhythm: Tachycardia.     Heart sounds: Normal heart sounds.   Pulmonary:      Effort: Pulmonary effort is normal. No respiratory distress.      Breath sounds: Normal breath sounds.   Abdominal:      General: Bowel sounds are normal. There is no distension.      Palpations: Abdomen is soft.      Tenderness: There is no abdominal tenderness.   Musculoskeletal:         General: No swelling.      Cervical back: Neck supple.   Skin:     General: Skin is warm and dry.      Capillary Refill: Capillary refill takes 2 to 3 seconds.      Findings: No rash.   Neurological:      General: No focal deficit present.      Mental Status: He is alert and oriented to person, place, and time.      Motor: Weakness present.      Coordination: Coordination abnormal.      Gait: Gait abnormal.   Psychiatric:         Mood and Affect: Mood normal.         Behavior: Behavior normal.         Thought Content: Thought content normal.       Results Review:  I have reviewed the labs, radiology results, and diagnostic studies.    Laboratory Data:   Results from last 7 days   Lab Units 07/03/24  0405 07/02/24 0109 06/30/24  0417   WBC 10*3/mm3 10.33 8.67 5.66   HEMOGLOBIN g/dL 13.3 13.9 12.7*   HEMATOCRIT % 40.6 43.4 39.6   PLATELETS 10*3/mm3 176 187 166        Results from last 7 days   Lab Units 07/03/24  0405 07/02/24  0109 06/30/24 0417 06/29/24  1027   SODIUM mmol/L 137  137 136 137   POTASSIUM mmol/L 4.6 5.8* 4.9 4.9   CHLORIDE mmol/L 105 106 105 103   CO2 mmol/L 21.0* 21.0* 22.0 23.0   BUN mg/dL 46* 45* 55* 61*   CREATININE mg/dL 2.02* 2.05* 2.47* 2.77*   CALCIUM mg/dL 10.2 10.2 9.8 10.4   BILIRUBIN mg/dL  --  0.3  --  0.4   ALK PHOS U/L  --  113  --  119*   ALT (SGPT) U/L  --  21  --  21   AST (SGOT) U/L  --  24  --  22   GLUCOSE mg/dL 110* 136* 83 94       Culture Data:   Blood Culture   Date Value Ref Range Status   06/29/2024 No growth at 3 days  Preliminary   06/29/2024 No growth at 3 days  Preliminary       Radiology Data:   Imaging Results (Last 24 Hours)       ** No results found for the last 24 hours. **            I have reviewed the patient's current medications.     Assessment/Plan   Assessment  Active Hospital Problems    Diagnosis     **Chest pain     Former smoker     GERD without esophagitis     Acute renal failure superimposed on chronic kidney disease     Fibrotic lung diseases     Stage 3 chronic kidney disease     Adenocarcinoma, lung     Malignant neoplasm of upper lobe of right lung     Dyspnea     Essential hypertension        Treatment Plan  Chest pain/hypertension/hyperlipidemia/CAD.  Aspirin.  Lipitor.  Patient denies any chest pain today.  Troponin trending down.  Start Imdur by cardiology yesterday.  Heart rates currently 120, EKG . DC Toprol change to Lopressor for now with double dose.  Possible atrial fibs, patient is asymptomatic this point.  Stress echo-intermediate finding.    Consult cardiology-medical management.  Tachycardia today, EKG for now.  Patient might need a Zio patch at discharge.     Stage IV lung cancer/adenocarcinoma.  Radiation treatment.  Consult radiation oncology.  Consult oncology.  CT of the chest-spiculated mass in the right upper lobe laterally is larger on the  current study, consolidation around a previously described nodule in the  right lung apex likely related to posttreatment change, Diffuse reticulonodular fibrotic  changes throughout both lungs, Emphysema, Pleural thickening on the right that is more focal in the right lung base laterall,. Metastatic pleural disease is considered, Ectasia of the ascending thoracic aorta measuring 4.3 cm- Main pulmonary artery segment is dilated measuring 3.6 cm- Mild cardiomegaly, Mediastinal lymph nodes appear stable- Multiple lymph nodes are calcified.  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 ...      Constipation . Constipation protocol.     Shortness of breath/COPD/emphysema/fibrosis of the lung base.  Patient is on chronic 2 L of oxygen at home.  Singulair.  Cut back prednisone.  VQ scan- Low probability of acute pulmonary embolism. .   Doppler ultrasound lower [id number]-Do thrombus vis in BLE.   Patient is currently on room air.     Nonobstructive kidney stone.     Acute on chronic stage III renal failure.  Previous creatinine 1/7/2023 2.05.  Slow IV hydration.  Creatinine stable.     Hyperkalemia.  Resolved.     Hyperglycemia.  Hemoglobin A1c 6.5.     Gout.  Allopurinol.     Allergic rhinitis.  Flonase.     Reflux . Protonix.  Zofran as needed.     Pain . Ultram as needed.     Prostate hypertrophy.  Flomax.     Insomnia/anxiety.  Melatonin nightly.  Antivert as needed.     Lovenox prophylaxis, renal dosing.     Advanced age.  84 years old.     Nutrition.  Multivitamins.  Cardiac diet.     Blood cultures pending-no growth in 3 days.     Deconditioning.  PT and OT consult.     Patient lives  at home independently.  Patient gets around with a cane/walker as needed.  Patient refused rehab placement.  Patient refused home health.     Medical Decision Making  Number and Complexity of problems: Chest pain.  Emphysema/COPD/fibrosis lung base/advanced age  Differential Diagnosis: None     Conditions and Status        Condition is unchanged.     [facility] Data  External documents reviewed: Previous note.  Cardiac tracing (EKG, telemetry) interpretation: Sinus .  Radiology interpretation: CT scan  Labs reviewed: Laboratory  Any tests that were considered but not ordered: Lab in a.m.     Decision rules/scores evaluated (example [id number], [place], etc): None     Discussed with: Patient     Care Planning  Shared decision making: Patient  Code status and discussions: Full code     Disposition  Social Determinants of Health that impact treatment or disposition: From home  1 to 2 days.    Electronically signed by Zachary Lloyd MD, 07/03/24, 10:49 CDT.

## 2024-07-03 NOTE — PROGRESS NOTES
Progress Note    Patient name: Karoline Prater  Patient : 1940  VISIT # [id number]  MR #[id number]  Room:     Subjective     Had another episode of dyspnea chest tightness, shortness of breath earlier this morning.  Got up to go to the bathroom, similar symptoms that he has been having regularly at home.  He was documented as having atrial fibrillation with RVR at about 197/min.  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     He has a remote history of a pancreatic mass identified and biopsied by Dr. Alexis on 10/29/03.  Pathology negative on open biopsy.    Fahad completed 4 cycles of combination chemotherapy with carboplatin, Keytruda, Alimta on 2019.   Maintenance Keytruda and Alimta every 3 weeks was delivered.   The final cycle number #25 of Keytruda/Alimta was delivered on 10/29/2020.  Harry has been on a chemotherapy holiday since 2020 due to issues of dyspnea and shortness of breath, requiring episodic steroids.     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    Imaging studies with CT scan of the chest on 2023 and a follow-up PET scan on 2023 documented localized progressive disease in the RUL of the lung.  He was referred to Dr. Danny Cool at [facility] for SBRT.    SBRT by Dr. Danny Cool was delivered in 5 treatment fractions for a total dose of 5000 cGy. XRT was initiated 2024 and was completed on 2024    Imaging were performed on 3/20/2024 to assess response to treatment and restaging on a chemotherapy.     CT CHEST WO CONTRAST AT [facility] on 3/20/2024: Compared to  11/08/2023  1.3 cm partially calcified subcarinal lymph node, unchanged.   2.0 x 1.5 x 1.9 cm right upper lobe mass, previously 2.0 x 2.5 x 1.9 cm   1.8 x 3.0 x 2.0 right upper lobe mass, previously 3.5 x 1.9 x 1.6 cm   Emphysematous changes with subpleural reticulations and possible honeycombing and fibrosis are noted with pleural thickening again observed along the right lateral chest wall extending to the diaphragmatic surface.   There are scattered subpleural calcifications     CT ABDOMEN PELVIS WO CONTRAST AT [facility] on 3/20/2024: compared to 11/08/2023  No evidence of metastasis in the abdomen/pelvis.         Mr. Prater was admitted on 6/29/2024 with chest pain on exertion elevated troponin level for cardiac evaluation and management.    ACTIVE or ASSOCIATED PROBLEMS:  Pulmonary fibrosis secondary to previous history of smoking and drilling rock for blasting at the [employer]   CKD associated anemia responding to Procrit.   Fahad has benign prostatic hypertrophy (BPH) that is stable on Flomax.   Orthostatic hypotension resolved with adjustment of metoprolol by Dr. Alexis   He takes Cozaar, metoprolol, Lasix and spironolactone without new cardiac issues.   Protonix continues without any new exacerbations of GERD.   Lower extremity edema overall has actually improved to some degree             FOR DETAILED TUMOR AND HEMATOLOGICAL HISTORIES COPIED FROM MY OFFICE RECORDS SEE 7/2/2024 consult            REVIEW OF SYSTEMS:   Constitutional: no fever, no night sweats, no fatigue;   HEENT: Left eye blindness                              Lungs: Episodic shortness of breath and right-sided pleuritic like chest pain where he requires oxygen about 3 to 4 AM several days in a row prior to his admission with resolution until on 6/29/2024, this did not resolve.  He came to the ED at [facility] for that.  CVS: no palpitation, no chest pain, no shortness of breath  GI: no abdominal pain, no nausea , no vomiting, no constipation  SHELBIE: no  dysuria, frequency and urgency, no hematuria, no kidney stones  Musculoskeletal: no joint pain, swelling , stiffness  Endocrine: no polyuria, polydipsia, no cold or heat intolerance  Hematology: no anemia, no easy bruising or bleeding, no history of clotting disorder  Dermatology: no skin rash, no eczema, no pruritus  Psychiatry: no depression, no anxiety,no panic attacks, no suicide ideation  Neurology: no syncope, no seizures, no numbness or tingling of hands, no numbness or tingling of feet, no paresis    Objective     Vital Signs  Temp:  [97.3 °F (36.3 °C)-98 °F (36.7 °C)] 97.6 °F (36.4 °C)  Heart Rate:  [] 87  Resp:  [16-18] 18  BP: (100-130)/(71-84) 117/78    Intake/Output Summary (Last 24 hours) at 7/3/2024 0714  Last data filed at 7/2/2024 1813  Gross per 24 hour   Intake 720 ml   Output --   Net 720 ml       PHYSICAL EXAM:  CONSTITUTIONAL: Alert, appropriate, no acute distress  EYES: Anicteric, left eye blindness  ENT: Mucous membranes moist, no oropharyngeal lesions   NECK: Supple, no masses   CHEST/LUNGS: Chronic appearing rales and crackles    CARDIOVASCULAR: RRR, no murmurs  ABDOMEN: soft nontender, active bowel sounds, no HSM  EXTREMITIES: warm, moves all fours  SKIN: warm, dry with no rashes or lesions  LYMPH: No cervical, clavicular, axillary, or inguinal lymphadenopathy  NEUROLOGIC: follows commands, nonfocal   PSYCH: mood and affect appropriate      CBC  Results from last 7 days   Lab Units 07/03/24  0405 07/02/24  0109 06/30/24  0417   WBC 10*3/mm3 10.33 8.67 5.66   HEMOGLOBIN g/dL 13.3 13.9 12.7*   HEMATOCRIT % 40.6 43.4 39.6   PLATELETS 10*3/mm3 176 187 166       CMP  Lab Results   Component Value Date    GLUCOSE 110 (H) 07/03/2024    BUN 46 (H) 07/03/2024    CREATININE 2.02 (H) 07/03/2024    EGFRIFNONA 29 (L) 08/27/2021    BCR 22.8 07/03/2024    CO2 21.0 (L) 07/03/2024    CALCIUM 10.2 07/03/2024    ALBUMIN 3.6 07/02/2024    AST 24 07/02/2024    ALT 21 07/02/2024       Results from last 7  days   Lab Units 06/29/24  1027   INR  1.04   APTT seconds 31.4       Blood Culture   Date Value Ref Range Status   06/29/2024 No growth at 3 days  Preliminary   06/29/2024 No growth at 3 days  Preliminary       Imaging Results (Last 72 Hours)       Procedure Component Value Units Date/Time    US Venous Doppler Lower Extremity Bilateral (duplex) [[id number]] Collected: 07/01/24 1325     Updated: 07/01/24 1328    Narrative:      History: Swelling       Impression:      Impression: There is no evidence of deep venous thrombosis or  superficial thrombophlebitis of right or left lower extremities.     Comments: Bilateral lower extremity venous duplex exam was performed  using color Doppler flow, Doppler waveform analysis, and grayscale  imaging, with and without compression. There is no evidence of deep  venous thrombosis in the common femoral, superficial femoral, popliteal,  peroneal, anterior tibial, and posterior tibial veins bilaterally. No  thrombus is identified in the saphenofemoral junctions and greater  saphenous veins bilaterally.            This report was signed and finalized on 7/1/2024 1:25 PM by Dr. Sidney Connors MD.       NM Lung Ventilation Perfusion [[id number]] Collected: 07/01/24 0823     Updated: 07/01/24 0828    Narrative:      EXAMINATION: NM LUNG VENTILATION PERFUSION-     7/1/2024 7:16 AM     HISTORY: chest pain and hypoxia; J18.9-Pneumonia, unspecified organism;  J84.10-Pulmonary fibrosis, unspecified; I51.7-Cardiomegaly; N17.9-Acute  kidney failure, unspecified; N18.9-Chronic kidney disease, unspecified;  R79.89-Other specified abnormal findings of blood chemistry.     After the intravenous injection of 5.4 mCi of technetium 99m  macroaggregated albumin, the images of the lungs were obtained in  anterior, posterior, both oblique and lateral projections with help for  scintillation camera.     The ventilation scans were not obtained.     The comparison is made with the previous study dated  7/23/2020.     Ill-defined, nonsegmental, linear, perfusion defects in both lungs are  similar to the previous study. There are no segmental or subsegmental  perfusion defects noted.       Impression:      1. Low probability of acute pulmonary embolism.        This report was signed and finalized on 7/1/2024 8:25 AM by Dr. Kelly Coleman MD.                 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     He has a remote history of a pancreatic mass identified and biopsied by Dr. Alexis on 10/29/03.  Pathology negative on open biopsy.    Fahad completed 4 cycles of combination chemotherapy with carboplatin, Keytruda, Alimta on 7/5/2019.   Maintenance Keytruda and Alimta every 3 weeks was delivered.   The final cycle number #25 of Keytruda/Alimta was delivered on 10/29/2020.  Harry has been on a chemotherapy holiday since October 2020 due to issues of dyspnea and shortness of breath, requiring episodic steroids.     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    Imaging studies with CT scan of the chest on 11/9/2023 and a follow-up PET scan on 12/7/2023 documented localized progressive disease in the RUL of the lung.  He was referred to Dr. Danny Cool at [facility] for SBRT.    SBRT by Dr. Danny Cool was delivered in 5 treatment fractions for a total dose of 5000 cGy. XRT was initiated  2024 and was completed on 2024    Imaging were performed on 3/20/2024 to assess response to treatment and restaging on a chemotherapy.     CT CHEST WO CONTRAST AT [facility] on 3/20/2024: Compared to 2023  1.3 cm partially calcified subcarinal lymph node, unchanged.   2.0 x 1.5 x 1.9 cm right upper lobe mass, previously 2.0 x 2.5 x 1.9 cm   1.8 x 3.0 x 2.0 right upper lobe mass, previously 3.5 x 1.9 x 1.6 cm   Emphysematous changes with subpleural reticulations and possible honeycombing and fibrosis are noted with pleural thickening again observed along the right lateral chest wall extending to the diaphragmatic surface.   There are scattered subpleural calcifications     CT ABDOMEN PELVIS WO CONTRAST AT [facility] on 3/20/2024: compared to 2023  No evidence of metastasis in the abdomen/pelvis.      Mr. Prater was admitted on 2024 with chest pain on exertion elevated troponin level for cardiac evaluation and management.  In workup, imaging studies have been performed and medical oncology consultation requested.     CT scan of the chest without contrast at [facility] on 2024 was compared to 2024 reported the following  1. A spiculated mass in the right upper lobe laterally is larger on the   current study.   2. There is consolidation around a previously described nodule in the   right lung apex likely related to posttreatment change.   3. Diffuse reticulonodular fibrotic changes throughout both lungs.   Emphysema.   4. Pleural thickening on the right that is more focal in the right lung   base laterally. Metastatic pleural disease is considered.   5. Ectasia of the ascending thoracic aorta measuring 4.3 cm. Main   pulmonary artery segment is dilated measuring 3.6 cm. Mild cardiomegaly. ...      CT scan of the abdomen pelvis at [facility] on 2024 was compared to 2023 and reported the followin. There is a mildly dilated proximal small bowel loop measuring 4 cm.  No other small bowel distention is  "seen. This could be transient or due  to partial obstruction. Gastric wall thickening likely related to under  distention. There is under distention of portions of the colon and  moderate stool in the colon.  2. 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  Fibrosis in the lung bases with emphysema.  3. Left renal cyst. A 6 mm nonobstructive renal stone lower pole on the  left.  4. Mildly enlarged prostate measuring 4.7 cm.  5. Air within the biliary tree likely related to prior sphincterotomy.  Prior cholecystectomy.  6. Coarsening of the trabecula in the right femoral neck and  trochanteric region could be related to early Paget's disease. There is  no cortical thickening. A lipo sclerosing myxofibrous tumor is felt to  be less likely as there is no sclerotic margin.     Definitive XRT with SBRT by Dr. Danny Cool was delivered in 5 treatment fractions for a total dose of 5000 cGy. XRT was initiated 1/24/2024 and was completed on 2/7/2024.      I will discuss imaging studies with Dr. Cool, but in my estimation the findings in the RUL on the CT scan from 6/29/2024 probably represents the area previously radiated and can be followed conservatively.    I discussed the case with Dr. Danny Cool.  Dr. Cool was gracious enough to review imaging studies with the following assessment by Dr. Danny Cool:  \"I have reviewed the CT scan of the thorax on this admission for atrial fibrillation with rapid ventricular response.     He completed recent SBRT radiotherapy for 2 right lung nodules on February 7, 2024.     Review of the current CT scan appears consistent with postradiation change.  We cannot entirely rule out progression however this appears unlikely with the chronology and radiographic appearance.     He is scheduled to return to our department in approximately 1 month with follow-up CT scan of the thorax and we will follow this closely with serial CT scans of the thorax.\"    I have " rescheduled Mr. Prater to see me in follow-up on 9/18/2024 at 10:30 AM        #2  Atrial fibrillation with RVR    Fahad had another episode of dyspnea chest tightness, shortness of breath earlier this morning when he got up to go to the bathroom, similar symptoms that he has been having regularly at home.  He was documented as having atrial fibrillation with RVR at about 197/min.  Cardiology has been consulted.  Discussed with nursing staff    Jeff Michele MD  07/03/24  07:14 CDT

## 2024-07-03 NOTE — DISCHARGE SUMMARY
[facility] Medicine Services  DISCHARGE SUMMARY       Date of Admission: 6/29/2024  Date of Discharge:  7/3/2024  Primary Care Physician: Irving Alexis MD    Presenting Problem/History of Present Illness:      Final Discharge Diagnoses:  Active Hospital Problems    Diagnosis     **Chest pain     Former smoker     GERD without esophagitis     Acute renal failure superimposed on chronic kidney disease     Fibrotic lung diseases     Stage 3 chronic kidney disease     Adenocarcinoma, lung     Malignant neoplasm of upper lobe of right lung     Dyspnea     Essential hypertension        Consults: Radiation oncology.  Oncology . Cardiology.      Pertinent Test Results:   Results for orders placed during the hospital encounter of 06/29/24    Adult Stress Echo W/ Cont or Stress Agent if Necessary Per Protocol    Interpretation Summary    Overall, this is an intermediate risk test for ischemia.    No ECG evidence of myocardial ischemia. Negative clinical evidence of myocardial ischemia. Findings consistent with a normal ECG stress test.    Abnormal stress echo consistent with an intermediate risk study for myocardial ischemia.    Left ventricular systolic function is normal. Left ventricular ejection fraction appears to be 61 - 65%.    The following left ventricular wall segments are hypokinetic: apical septal and apex hypokinetic.      Imaging Results (All)       Procedure Component Value Units Date/Time    US Venous Doppler Lower Extremity Bilateral (duplex) [[id number]] Collected: 07/01/24 1325     Updated: 07/01/24 1328    Narrative:      History: Swelling       Impression:      Impression: There is no evidence of deep venous thrombosis or  superficial thrombophlebitis of right or left lower extremities.     Comments: Bilateral lower extremity venous duplex exam was performed  using color Doppler flow, Doppler waveform analysis, and grayscale  imaging, with and without  compression. There is no evidence of deep  venous thrombosis in the common femoral, superficial femoral, popliteal,  peroneal, anterior tibial, and posterior tibial veins bilaterally. No  thrombus is identified in the saphenofemoral junctions and greater  saphenous veins bilaterally.            This report was signed and finalized on 7/1/2024 1:25 PM by Dr. Sidney Connors MD.       NM Lung Ventilation Perfusion [[id number]] Collected: 07/01/24 0823     Updated: 07/01/24 0828    Narrative:      EXAMINATION: NM LUNG VENTILATION PERFUSION-     7/1/2024 7:16 AM     HISTORY: chest pain and hypoxia; J18.9-Pneumonia, unspecified organism;  J84.10-Pulmonary fibrosis, unspecified; I51.7-Cardiomegaly; N17.9-Acute  kidney failure, unspecified; N18.9-Chronic kidney disease, unspecified;  R79.89-Other specified abnormal findings of blood chemistry.     After the intravenous injection of 5.4 mCi of technetium 99m  macroaggregated albumin, the images of the lungs were obtained in  anterior, posterior, both oblique and lateral projections with help for  scintillation camera.     The ventilation scans were not obtained.     The comparison is made with the previous study dated 7/23/2020.     Ill-defined, nonsegmental, linear, perfusion defects in both lungs are  similar to the previous study. There are no segmental or subsegmental  perfusion defects noted.       Impression:      1. Low probability of acute pulmonary embolism.        This report was signed and finalized on 7/1/2024 8:25 AM by Dr. Kelly Coleman MD.       CT Chest Without Contrast Diagnostic [[id number]] Collected: 06/29/24 1338     Updated: 06/29/24 1351    Narrative:      EXAMINATION:  CT CHEST WO CONTRAST DIAGNOSTIC-  6/29/2024 10:35 AM     HISTORY: Shortness of breath, increased oxygen, pain in chest and back;  J18.9-Pneumonia, unspecified organism; J84.10-Pulmonary fibrosis,  unspecified; I51.7-Cardiomegaly; N17.9-Acute kidney failure,  unspecified;  N18.9-Chronic kidney disease, unspecified; R79.89-Other  specified abnormal findings of blood chemistry. Lung cancer.     COMPARISON : 5/6/2024.     DLP: 556.65 mGy.cm Automated dosage reduction technique was utilized to  reduce patient dosage.     TECHNIQUE: Spiral CT was performed of the chest without contrast.  Sagittal and coronal images were reconstructed.     MEDIASTINUM, HEART AND VASCULAR STRUCTURES: There is atheromatous  calcification of the thoracic aorta and coronary arteries. The ascending  thoracic aorta is dilated measuring 4.3 cm. The main pulmonary artery  segment is dilated measuring 3.6 cm. There are small mediastinal lymph  nodes many of which demonstrate calcification. There is a 1.4 cm short  axis diameter AP window lymph node that appears stable. There is mild  cardiomegaly.     LUNGS: There is pleural thickening on the right that is most prominent  in the right lung base laterally where the pleural thickness measures  about 2.3 cm. A spiculated right upper lobe lesion laterally measures  4.7 x 2.1 cm, previously 4 x 2.2 cm. Consolidation in the right upper  lobe more superiorly and anteriorly may be related to posttreatment  changes of a previously described spiculated lesion. There are  reticulonodular changes in both lungs likely related to pulmonary  fibrosis. There is interlobular septal thickening. Emphysematous changes  are noted.     UPPER ABDOMEN: Please see the abdomen and pelvis CT report separately.     BONES: There are degenerative changes of the spine. No definite acute  bony abnormality is seen.          Impression:      1. A spiculated mass in the right upper lobe laterally is larger on the  current study.  2. There is consolidation around a previously described nodule in the  right lung apex likely related to posttreatment change.  3. Diffuse reticulonodular fibrotic changes throughout both lungs.  Emphysema.  4. Pleural thickening on the right that is more focal in the right  lung  base laterally. Metastatic pleural disease is considered.  5. Ectasia of the ascending thoracic aorta measuring 4.3 cm. Main  pulmonary artery segment is dilated measuring 3.6 cm. Mild cardiomegaly.     6. Mediastinal lymph nodes appear stable. Multiple lymph nodes are  calcified.        The full report of this exam was immediately signed and available to the  emergency room. The patient is currently in the emergency room.           This report was signed and finalized on 6/29/2024 1:48 PM by Dr. Ramos Lagos MD.       CT Abdomen Pelvis Without Contrast [[id number]] Collected: 06/29/24 1208     Updated: 06/29/24 1230    Narrative:      EXAMINATION:  CT ABDOMEN PELVIS WO CONTRAST-  6/29/2024 10:35 AM     HISTORY: Shortness of breath, increased oxygen, pain in chest and back;  J18.9-Pneumonia, unspecified organism; J84.10-Pulmonary fibrosis,  unspecified; I51.7-Cardiomegaly; N17.9-Acute kidney failure,  unspecified; N18.9-Chronic kidney disease, unspecified; R79.89-Other  specified abnormal findings of blood chemistry.     TECHNIQUE: Spiral CT was performed of the abdomen and pelvis without  contrast. Multiplanar images were reconstructed.     DLP: 556.65 mGy.cm Automated dosage reduction technique was utilized to  reduce patient dosage.     COMPARISON: 1/5/2023.     LUNG BASES: There is coronary artery calcification. There is mild  bilateral gynecomastia. There is focal pleural thickening in the right  lung base laterally. There is fibrosis in the lung bases as well as  emphysema.     LIVER AND SPLEEN: There is air within the biliary tree. No focal liver  lesion is seen. Prior cholecystectomy. The unenhanced spleen is  unremarkable.     PANCREAS: Normal unenhanced appearance of the pancreas.     KIDNEYS AND ADRENALS: The adrenal glands are normal. There are renal  cysts on the left. There is a 6 mm nonobstructive stone in the lower  pole left kidney. The ureters are nondilated. The urinary bladder  is  unremarkable. The prostate measures about 4.7 cm transversely.     BOWEL: Gastric wall thickening likely related to under distention. A  proximal small bowel loop is mildly dilated measuring 4 cm. No other  small bowel distention is seen. The appendix is normal. There is under  distention of portions of the colon. There is moderate stool in the  colon.     OTHER: There is atheromatous disease of the aortoiliac vessels. There  are bilateral fat-containing inguinal hernias. The hernia on the left  extends all the way down to the scrotum. There are degenerative changes  of the spine. There is coarsening of the trabecula in the right femoral  neck and trochanteric region.          Impression:      1. There is a mildly dilated proximal small bowel loop measuring 4 cm.  No other small bowel distention is seen. This could be transient or due  to partial obstruction. Gastric wall thickening likely related to under  distention. There is under distention of portions of the colon and  moderate stool in the colon.  2. 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  Fibrosis in the lung bases with emphysema.  3. Left renal cyst. A 6 mm nonobstructive renal stone lower pole on the  left.  4. Mildly enlarged prostate measuring 4.7 cm.  5. Air within the biliary tree likely related to prior sphincterotomy.  Prior cholecystectomy.  6. Coarsening of the trabecula in the right femoral neck and  trochanteric region could be related to early Paget's disease. There is  no cortical thickening. A lipo sclerosing myxofibrous tumor is felt to  be less likely as there is no sclerotic margin.        The full report of this exam was immediately signed and available to the  emergency room. The patient is currently in the emergency room.     This report was signed and finalized on 6/29/2024 12:27 PM by Dr. Ramos Lagos MD.       XR Chest 1 View [[id number]] Collected: 06/29/24 1033     Updated: 06/29/24 1038     Narrative:      EXAMINATION:  XR CHEST 1 VW-  6/29/2024 9:20 AM     HISTORY: Chest pain and shortness of breath.     COMPARISON: 1/5/2023.     TECHNIQUE: Single view AP image.     FINDINGS: There is pulmonary fibrosis. There is patchy infiltrate in the  right midlung zone. There is stable blunting of the right costophrenic  angle. There is cardiomegaly. There is atheromatous calcification of the  thoracic aorta. There is a port catheter on the right.          Impression:      1. Patchy infiltrate in the right midlung zone could represent an area  of pneumonia.  2. Stable appearing pulmonary fibrosis.  3. Stable blunting of the right costophrenic angle, likely a small  effusion.  4. Cardiomegaly.           This report was signed and finalized on 6/29/2024 10:35 AM by Dr. Ramos Lagos MD.             LAB RESULTS:      Lab 07/03/24  0405 07/02/24  0109 06/30/24 0417 06/29/24  1027   WBC 10.33 8.67 5.66 7.39   HEMOGLOBIN 13.3 13.9 12.7* 13.8   HEMATOCRIT 40.6 43.4 39.6 44.0   PLATELETS 176 187 166 181   NEUTROS ABS  --  7.74* 3.95 5.49   IMMATURE GRANS (ABS)  --  0.15* 0.08* 0.11*   LYMPHS ABS  --  0.42* 0.74 0.83   MONOS ABS  --  0.35 0.68 0.80   EOS ABS  --  0.00 0.17 0.12   MCV 94.2 95.0 96.6 96.9   PROCALCITONIN  --   --   --  0.11   LACTATE  --   --   --  1.7   PROTIME  --   --   --  14.1   APTT  --   --   --  31.4         Lab 07/03/24  0405 07/02/24  0109 06/30/24  0417 06/29/24  1027 06/29/24  1026   SODIUM 137 137 136 137  --    SODIUM, VENOUS  --   --   --   --  138   POTASSIUM 4.6 5.8* 4.9 4.9  --    POTASSIUM, VENOUS  --   --   --   --  4.8   CHLORIDE 105 106 105 103  --    CO2 21.0* 21.0* 22.0 23.0  --    ANION GAP 11.0 10.0 9.0 11.0  --    BUN 46* 45* 55* 61*  --    CREATININE 2.02* 2.05* 2.47* 2.77*  --    EGFR 32.3* 31.8* 25.4* 22.1*  --    GLUCOSE 110* 136* 83 94  --    CALCIUM 10.2 10.2 9.8 10.4  --    MAGNESIUM 1.8  --   --  1.8  --    HEMOGLOBIN A1C  --  6.50*  --   --   --    TSH  --   --   --   2.350  --          Lab 07/02/24  0109 06/29/24  1027   TOTAL PROTEIN 7.0 7.2   ALBUMIN 3.6 3.8   GLOBULIN 3.4 3.4   ALT (SGPT) 21 21   AST (SGOT) 24 22   BILIRUBIN 0.3 0.4   ALK PHOS 113 119*         Lab 06/30/24  0417 06/29/24  1320 06/29/24  1027   PROBNP  --   --  828.6   HSTROP T 107* 125* 116*   PROTIME  --   --  14.1   INR  --   --  1.04         Lab 07/02/24  0109   CHOLESTEROL 115   LDL CHOL 55   HDL CHOL 35*   TRIGLYCERIDES 143             Lab 06/29/24  1026   FIO2 21   CARBOXYHEMOGLOBIN (VENOUS) 1.1     Brief Urine Lab Results  (Last result in the past 365 days)        Color   Clarity   Blood   Leuk Est   Nitrite   Protein   CREAT   Urine HCG        06/29/24 1028 Yellow   Clear   Negative   Negative   Negative   Negative                 Microbiology Results (last 10 days)       Procedure Component Value - Date/Time    Blood Culture - Blood, Arm, Right [[id number]]  (Normal) Collected: 06/29/24 1105    Lab Status: Preliminary result Specimen: Blood from Arm, Right Updated: 07/02/24 1130     Blood Culture No growth at 3 days    Respiratory Panel PCR w/COVID-19(SARS-CoV-2) JORDYN/MATILDE/BRYAN/PAD/COR/ASHER In-House, NP Swab in UTM/VTM, 2 HR TAT - Swab, Nasopharynx [[id number]]  (Normal) Collected: 06/29/24 1027    Lab Status: Final result Specimen: Swab from Nasopharynx Updated: 06/29/24 1120     ADENOVIRUS, PCR Not Detected     Coronavirus 229E Not Detected     Coronavirus HKU1 Not Detected     Coronavirus NL63 Not Detected     Coronavirus OC43 Not Detected     COVID19 Not Detected     Human Metapneumovirus Not Detected     Human Rhinovirus/Enterovirus Not Detected     Influenza A PCR Not Detected     Influenza B PCR Not Detected     Parainfluenza Virus 1 Not Detected     Parainfluenza Virus 2 Not Detected     Parainfluenza Virus 3 Not Detected     Parainfluenza Virus 4 Not Detected     RSV, PCR Not Detected     Bordetella pertussis pcr Not Detected     Bordetella parapertussis PCR Not Detected     Chlamydophila  pneumoniae PCR Not Detected     Mycoplasma pneumo by PCR Not Detected    Narrative:      In the setting of a positive respiratory panel with a viral infection PLUS a negative procalcitonin without other underlying concern for bacterial infection, consider observing off antibiotics or discontinuation of antibiotics and continue supportive care. If the respiratory panel is positive for atypical bacterial infection (Bordetella pertussis, Chlamydophila pneumoniae, or Mycoplasma pneumoniae), consider antibiotic de-escalation to target atypical bacterial infection.    Blood Culture - Blood, Arm, Right [[id number]]  (Normal) Collected: 06/29/24 1027    Lab Status: Preliminary result Specimen: Blood from Arm, Right Updated: 07/02/24 1130     Blood Culture No growth at 3 days        HPI .  84 year old male with past medical history of stage IV lung cancer, A-fib, CKD stage III, hypertension that presents to the emergency room with complaints of mid chest pain for several weeks, radiating to back. Worse and not relieved today decided to come to the ER. Blood work shows elevated troponin, which is not a new finding. Also higher than baseline creatinine.      [facility]  Summary:    1.  Successful femoral artery ultrasound   2.  Successful femoral artery arteriogram   3.  Supervision of the administration of moderate conscious   sedation   4. Mild coronary artery disease   5.  Left ventricular function is normal   6.  Terminal portion of the circumflex / OM3 is diffusely   diseased > 80% and very small, < 1 mm in diameter.     Hospital Course:   Chest pain/hypertension/hyperlipidemia/CAD.   Aspirin.  Lipitor.  Toprol .  Patient denies any chest pain today.  Troponin trending down.  Start Imdur by cardiology.  Stress echo-intermediate finding.    Consult cardiology-medical management.     Stage IV lung cancer/adenocarcinoma.  Radiation treatment.  Consult radiation oncology.  Consult oncology.  CT of the  chest-spiculated mass in the right upper lobe laterally is larger on the  current study, consolidation around a previously described nodule in the  right lung apex likely related to posttreatment change, Diffuse reticulonodular fibrotic changes throughout both lungs, Emphysema, Pleural thickening on the right that is more focal in the right lung base laterall,. Metastatic pleural disease is considered, Ectasia of the ascending thoracic aorta measuring 4.3 cm- Main pulmonary artery segment is dilated measuring 3.6 cm- Mild cardiomegaly, Mediastinal lymph nodes appear stable- Multiple lymph nodes are calcified.  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 ...      Constipation . Constipation protocol.     Shortness of breath/COPD/emphysema/fibrosis of the lung base.  Patient is on chronic 2 L of oxygen at home.  Singulair.  Cut back prednisone.  VQ scan- Low probability of acute pulmonary embolism. .   Doppler ultrasound lower tlkzciwyw-vakikresdlt-Oq thrombus vis in BLE.   Patient is currently on room air.     Nonobstructive kidney stone.     Acute on chronic stage III renal failure.  Previous creatinine 1/7/2023 2.05.  Slow IV hydration.  Creatinine stable.     Hyperkalemia.  Resolved.     Hyperglycemia.  Hemoglobin A1c 6.5.     Gout.  Allopurinol.     Allergic rhinitis.  Flonase.     Reflux . Protonix.  Zofran as needed.     Pain . Ultram as needed.     Prostate hypertrophy.  Flomax.     Insomnia/anxiety.  Melatonin nightly.   "Antivert as needed.     Lovenox prophylaxis, renal dosing.     Advanced age.  84 years old.     Nutrition.  Multivitamins.  Cardiac diet.     Blood cultures-no growth in 3 days.     Deconditioning.  PT and OT consult.     Patient lives at home independently.  Patient gets around with a cane/walker as needed.    Patient refused rehab placement.  Patient refused home health.  Blood pressure slightly high but stable, afebrile.  Plan to discharge patient today.  Follow-up with Dr. Michele outpatient in 1 week.  Follow-up with his cardiologist in 2 weeks.  Follow-up with PCP in 1 week.    Physical Exam on Discharge:  /88 (BP Location: Right arm, Patient Position: Lying)   Pulse 94   Temp 97.6 °F (36.4 °C) (Oral)   Resp 18   Ht 162.6 cm (64\")   Wt 75.5 kg (166 lb 7.2 oz)   SpO2 95%   BMI 28.57 kg/m²   Physical Exam  Vitals and nursing note reviewed.   Constitutional:       Comments: Advanced age.  Chronically ill.   HENT:      Head: Normocephalic and atraumatic.   Eyes:      Conjunctiva/sclera: Conjunctivae normal.      Pupils: Pupils are equal, round, and reactive to light.      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   Cardiovascular:      Rate and Rhythm: Normal rate and regular rhythm.      Heart sounds: Normal heart sounds.   Pulmonary:      Effort: Pulmonary effort is normal. No respiratory distress.      Breath sounds: Normal breath sounds.   Abdominal:      General: Bowel sounds are normal. There is no distension.      Palpations: Abdomen is soft.      Tenderness: There is no abdominal tenderness.   Musculoskeletal:         General: No swelling.      Cervical back: Neck supple.   Skin:     General: Skin is warm and dry.      Capillary Refill: Capillary refill takes 2 to 3 seconds.      Findings: No rash.   Neurological:      General: No focal deficit present.      Mental Status: He is alert and oriented to person, place, and time.      Motor: Weakness present.   Psychiatric:         Mood and Affect: Mood " normal.         Behavior: Behavior normal.         Thought Content: Thought content normal.           Condition on Discharge: Stable.    Discharge Disposition: Discharge home with family.      Discharge Medications:     Discharge Medications        New Medications        Instructions Start Date   aspirin 81 MG EC tablet   81 mg, Oral, Daily   Start Date: July 4, 2024     atorvastatin 20 MG tablet  Commonly known as: LIPITOR   20 mg, Oral, Nightly      isosorbide mononitrate 30 MG 24 hr tablet  Commonly known as: IMDUR   30 mg, Oral, Every 24 Hours Scheduled   Start Date: July 4, 2024     predniSONE 20 MG tablet  Commonly known as: DELTASONE   Take 1.5 tablets by mouth Daily With Breakfast for 3 days, THEN 1 tablet Daily With Breakfast for 3 days, THEN 0.5 tablets Daily With Breakfast for 3 days.   Start Date: July 4, 2024            Continue These Medications        Instructions Start Date   acetaminophen 500 MG tablet  Commonly known as: TYLENOL   1,000 mg, Oral, Nightly      allopurinol 100 MG tablet  Commonly known as: ZYLOPRIM   100 mg, Oral, Daily      fluticasone 50 MCG/ACT nasal spray  Commonly known as: FLONASE   1 spray, Nasal, Daily      losartan 50 MG tablet  Commonly known as: COZAAR   50 mg, Oral, Daily      meclizine 12.5 MG tablet  Commonly known as: ANTIVERT   12.5 mg, Oral, 3 Times Daily PRN      melatonin 5 MG tablet tablet   10 mg, Oral, Nightly      metoprolol succinate XL 25 MG 24 hr tablet  Commonly known as: TOPROL-XL   25 mg, Oral, Daily      montelukast 10 MG tablet  Commonly known as: SINGULAIR   10 mg, Oral, Nightly      multivitamin with minerals tablet tablet   1 tablet, Oral, Daily      pantoprazole 40 MG EC tablet  Commonly known as: Protonix   40 mg, Oral, Daily      sodium bicarbonate 650 MG tablet   650 mg, Oral, 3 Times Daily      tamsulosin 0.4 MG capsule 24 hr capsule  Commonly known as: FLOMAX   1 capsule, Oral, Nightly             Stop These Medications      furosemide 20 MG  tablet  Commonly known as: LASIX     spironolactone 25 MG tablet  Commonly known as: ALDACTONE              Discharge Diet:   Diet Instructions       Advance Diet As Tolerated -Target Diet: Cardiac .      Target Diet: Cardiac .            Activity at Discharge:   Activity Instructions       Activity as Tolerated              Follow-up Appointments:   Future Appointments   Date Time Provider Department Center   8/8/2024  1:00 PM Fidencio Miller APRN [facility]   Follow-up with PCP 1 week.  Follow-up with Dr. Michele in 1 week.  Follow-up with his cardiology in 2 weeks.    Electronically signed by Zachary Llody MD, 07/03/24, 11:11 CDT.    Time: Greater than 30 minutes.

## 2024-07-03 NOTE — PLAN OF CARE
Goal Outcome Evaluation:      PT is A&O, VSS. PT had a run of Vtach, which his HR went as high as 197, then went into Afib RVR, and then converted back to SR. Dr. Peñaloza was contacted and orders were given to check mag and K and consult cardio in the am. PT had no complaints of chest pain, but did state he could feel something going on with his heart. During this episode he had gotten up and went to the restroom. Other than that pt appeared to rest well overnight. Safety maintained and call light within reach.

## 2024-07-04 ENCOUNTER — READMISSION MANAGEMENT (OUTPATIENT)
Dept: CALL CENTER | Facility: HOSPITAL | Age: 84
End: 2024-07-04
Payer: MEDICARE

## 2024-07-04 VITALS
RESPIRATION RATE: 18 BRPM | SYSTOLIC BLOOD PRESSURE: 134 MMHG | OXYGEN SATURATION: 98 % | HEIGHT: 64 IN | TEMPERATURE: 97.6 F | WEIGHT: 166.45 LBS | BODY MASS INDEX: 28.42 KG/M2 | DIASTOLIC BLOOD PRESSURE: 81 MMHG | HEART RATE: 77 BPM

## 2024-07-04 LAB
ANION GAP SERPL CALCULATED.3IONS-SCNC: 10 MMOL/L (ref 5–15)
BACTERIA SPEC AEROBE CULT: NORMAL
BACTERIA SPEC AEROBE CULT: NORMAL
BH CV ECHO MEAS - AO MAX PG: 7.8 MMHG
BH CV ECHO MEAS - AO MEAN PG: 4 MMHG
BH CV ECHO MEAS - AO V2 MAX: 140 CM/SEC
BH CV ECHO MEAS - AO V2 VTI: 25.9 CM
BH CV ECHO MEAS - AVA(I,D): 3.1 CM2
BH CV ECHO MEAS - EDV(CUBED): 57.5 ML
BH CV ECHO MEAS - EDV(MOD-SP2): 13.6 ML
BH CV ECHO MEAS - EDV(MOD-SP4): 20.1 ML
BH CV ECHO MEAS - EF(MOD-SP2): 55.4 %
BH CV ECHO MEAS - EF(MOD-SP4): 73.2 %
BH CV ECHO MEAS - ESV(CUBED): 14.9 ML
BH CV ECHO MEAS - ESV(MOD-SP2): 6.1 ML
BH CV ECHO MEAS - ESV(MOD-SP4): 5.4 ML
BH CV ECHO MEAS - FS: 36.3 %
BH CV ECHO MEAS - IVS/LVPW: 0.68 CM
BH CV ECHO MEAS - IVSD: 0.7 CM
BH CV ECHO MEAS - LA DIMENSION: 3.4 CM
BH CV ECHO MEAS - LAT PEAK E' VEL: 5.7 CM/SEC
BH CV ECHO MEAS - LV DIASTOLIC VOL/BSA (35-75): 11.1 CM2
BH CV ECHO MEAS - LV MASS(C)D: 99.1 GRAMS
BH CV ECHO MEAS - LV MAX PG: 3.8 MMHG
BH CV ECHO MEAS - LV MEAN PG: 2 MMHG
BH CV ECHO MEAS - LV SYSTOLIC VOL/BSA (12-30): 3 CM2
BH CV ECHO MEAS - LV V1 MAX: 97.9 CM/SEC
BH CV ECHO MEAS - LV V1 VTI: 19.5 CM
BH CV ECHO MEAS - LVIDD: 3.9 CM
BH CV ECHO MEAS - LVIDS: 2.46 CM
BH CV ECHO MEAS - LVOT AREA: 4.2 CM2
BH CV ECHO MEAS - LVOT DIAM: 2.3 CM
BH CV ECHO MEAS - LVPWD: 1.04 CM
BH CV ECHO MEAS - MED PEAK E' VEL: 5.4 CM/SEC
BH CV ECHO MEAS - MR MAX PG: 131.6 MMHG
BH CV ECHO MEAS - MR MAX VEL: 572.7 CM/SEC
BH CV ECHO MEAS - MV A MAX VEL: 140 CM/SEC
BH CV ECHO MEAS - MV DEC SLOPE: 287 CM/SEC2
BH CV ECHO MEAS - MV E MAX VEL: 72.8 CM/SEC
BH CV ECHO MEAS - MV E/A: 0.52
BH CV ECHO MEAS - MV P1/2T: 79.3 MSEC
BH CV ECHO MEAS - MVA(P1/2T): 2.8 CM2
BH CV ECHO MEAS - PA V2 MAX: 84.4 CM/SEC
BH CV ECHO MEAS - RAP SYSTOLE: 3 MMHG
BH CV ECHO MEAS - RV MAX PG: 1.59 MMHG
BH CV ECHO MEAS - RV V1 MAX: 63 CM/SEC
BH CV ECHO MEAS - RVDD: 2.8 CM
BH CV ECHO MEAS - RVSP: 40.9 MMHG
BH CV ECHO MEAS - SV(LVOT): 81 ML
BH CV ECHO MEAS - SV(MOD-SP2): 7.5 ML
BH CV ECHO MEAS - SV(MOD-SP4): 14.7 ML
BH CV ECHO MEAS - SVI(LVOT): 44.8 ML/M2
BH CV ECHO MEAS - SVI(MOD-SP2): 4.2 ML/M2
BH CV ECHO MEAS - SVI(MOD-SP4): 8.1 ML/M2
BH CV ECHO MEAS - TR MAX PG: 37.9 MMHG
BH CV ECHO MEAS - TR MAX VEL: 308 CM/SEC
BH CV ECHO MEASUREMENTS AVERAGE E/E' RATIO: 13.12
BH CV XLRA - RV BASE: 3.3 CM
BUN SERPL-MCNC: 48 MG/DL (ref 8–23)
BUN/CREAT SERPL: 20.5 (ref 7–25)
CALCIUM SPEC-SCNC: 10 MG/DL (ref 8.6–10.5)
CHLORIDE SERPL-SCNC: 106 MMOL/L (ref 98–107)
CO2 SERPL-SCNC: 23 MMOL/L (ref 22–29)
CREAT SERPL-MCNC: 2.34 MG/DL (ref 0.76–1.27)
DEPRECATED RDW RBC AUTO: 59.4 FL (ref 37–54)
EGFRCR SERPLBLD CKD-EPI 2021: 27.1 ML/MIN/1.73
ERYTHROCYTE [DISTWIDTH] IN BLOOD BY AUTOMATED COUNT: 17.1 % (ref 12.3–15.4)
GLUCOSE SERPL-MCNC: 107 MG/DL (ref 65–99)
HCT VFR BLD AUTO: 41.1 % (ref 37.5–51)
HGB BLD-MCNC: 13 G/DL (ref 13–17.7)
LEFT ATRIUM VOLUME INDEX: 18.8 ML/M2
LEFT ATRIUM VOLUME: 34 ML
MCH RBC QN AUTO: 30.2 PG (ref 26.6–33)
MCHC RBC AUTO-ENTMCNC: 31.6 G/DL (ref 31.5–35.7)
MCV RBC AUTO: 95.6 FL (ref 79–97)
PLATELET # BLD AUTO: 160 10*3/MM3 (ref 140–450)
PMV BLD AUTO: 12.1 FL (ref 6–12)
POTASSIUM SERPL-SCNC: 4.6 MMOL/L (ref 3.5–5.2)
RBC # BLD AUTO: 4.3 10*6/MM3 (ref 4.14–5.8)
SODIUM SERPL-SCNC: 139 MMOL/L (ref 136–145)
WBC NRBC COR # BLD AUTO: 9.34 10*3/MM3 (ref 3.4–10.8)

## 2024-07-04 PROCEDURE — 63710000001 PREDNISONE PER 1 MG: Performed by: FAMILY MEDICINE

## 2024-07-04 PROCEDURE — 80048 BASIC METABOLIC PNL TOTAL CA: CPT | Performed by: FAMILY MEDICINE

## 2024-07-04 PROCEDURE — 25010000002 ENOXAPARIN PER 10 MG: Performed by: FAMILY MEDICINE

## 2024-07-04 PROCEDURE — 99232 SBSQ HOSP IP/OBS MODERATE 35: CPT | Performed by: STUDENT IN AN ORGANIZED HEALTH CARE EDUCATION/TRAINING PROGRAM

## 2024-07-04 PROCEDURE — 85027 COMPLETE CBC AUTOMATED: CPT | Performed by: FAMILY MEDICINE

## 2024-07-04 PROCEDURE — 93010 ELECTROCARDIOGRAM REPORT: CPT | Performed by: INTERNAL MEDICINE

## 2024-07-04 RX ORDER — METOPROLOL SUCCINATE 50 MG/1
50 TABLET, EXTENDED RELEASE ORAL DAILY
Qty: 90 TABLET | Refills: 0 | Status: SHIPPED | OUTPATIENT
Start: 2024-07-04

## 2024-07-04 RX ORDER — FLECAINIDE ACETATE 50 MG/1
50 TABLET ORAL EVERY 12 HOURS SCHEDULED
Status: DISCONTINUED | OUTPATIENT
Start: 2024-07-04 | End: 2024-07-04

## 2024-07-04 RX ORDER — PREDNISONE 20 MG/1
TABLET ORAL
Qty: 6 TABLET | Refills: 0 | Status: SHIPPED | OUTPATIENT
Start: 2024-07-04 | End: 2024-07-12

## 2024-07-04 RX ADMIN — SODIUM BICARBONATE 650 MG: 650 TABLET ORAL at 08:48

## 2024-07-04 RX ADMIN — ALLOPURINOL 100 MG: 100 TABLET ORAL at 08:48

## 2024-07-04 RX ADMIN — ASPIRIN 81 MG: 81 TABLET, COATED ORAL at 08:47

## 2024-07-04 RX ADMIN — PREDNISONE 20 MG: 20 TABLET ORAL at 08:47

## 2024-07-04 RX ADMIN — Medication 1 TABLET: at 08:49

## 2024-07-04 RX ADMIN — ISOSORBIDE MONONITRATE 30 MG: 60 TABLET, EXTENDED RELEASE ORAL at 08:47

## 2024-07-04 RX ADMIN — FLUTICASONE PROPIONATE 1 SPRAY: 50 SPRAY, METERED NASAL at 08:49

## 2024-07-04 RX ADMIN — Medication 10 ML: at 08:49

## 2024-07-04 RX ADMIN — ENOXAPARIN SODIUM 30 MG: 100 INJECTION SUBCUTANEOUS at 08:47

## 2024-07-04 RX ADMIN — PANTOPRAZOLE SODIUM 20 MG: 20 TABLET, DELAYED RELEASE ORAL at 08:49

## 2024-07-04 RX ADMIN — LOSARTAN POTASSIUM 50 MG: 50 TABLET, FILM COATED ORAL at 08:47

## 2024-07-04 RX ADMIN — METOPROLOL TARTRATE 25 MG: 25 TABLET, FILM COATED ORAL at 08:47

## 2024-07-04 RX ADMIN — DRONEDARONE 400 MG: 400 TABLET, FILM COATED ORAL at 08:52

## 2024-07-04 NOTE — PROGRESS NOTES
Progress Note    Patient name: Karoline Prater  Patient : 1940  VISIT # [id number]  MR #[id number]  Room:     Subjective No chest pain. Feels well. 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     He has a remote history of a pancreatic mass identified and biopsied by Dr. Alexis on 10/29/03.  Pathology negative on open biopsy.    Fahad completed 4 cycles of combination chemotherapy with carboplatin, Keytruda, Alimta on 2019.   Maintenance Keytruda and Alimta every 3 weeks was delivered.   The final cycle number #25 of Keytruda/Alimta was delivered on 10/29/2020.  Harry has been on a chemotherapy holiday since 2020 due to issues of dyspnea and shortness of breath, requiring episodic steroids.     Fahad continues to have chronic dyspnea on exertion associated with pulmonary fibrosis from smoking history as well as drilling and blasting rock at the [employer], but still at baseline without exacerbations.    Imaging studies with CT scan of the chest on 2023 and a follow-up PET scan on 2023 documented localized progressive disease in the RUL of the lung.  He was referred to Dr. Danny Cool at [facility] for SBRT.    SBRT by Dr. Danny Cool was delivered in 5 treatment fractions for a total dose of 5000 cGy. XRT was initiated 2024 and was completed on 2024    Imaging were performed on 3/20/2024 to assess response to treatment and restaging on a chemotherapy.     CT CHEST WO CONTRAST AT [facility] on 3/20/2024: Compared to 2023  1.3 cm partially calcified subcarinal lymph node, unchanged.   2.0 x 1.5 x 1.9 cm right upper lobe mass, previously 2.0 x 2.5 x 1.9 cm   1.8 x 3.0 x 2.0 right upper lobe mass, previously 3.5 x 1.9 x 1.6 cm   Emphysematous changes with subpleural  reticulations and possible honeycombing and fibrosis are noted with pleural thickening again observed along the right lateral chest wall extending to the diaphragmatic surface.   There are scattered subpleural calcifications     CT ABDOMEN PELVIS WO CONTRAST AT [facility] on 3/20/2024: compared to 11/08/2023  No evidence of metastasis in the abdomen/pelvis.         Mr. Prater was admitted on 6/29/2024 with chest pain on exertion elevated troponin level for cardiac evaluation and management.    ACTIVE or ASSOCIATED PROBLEMS:  Pulmonary fibrosis secondary to previous history of smoking and drilling rock for blasting at the [employer]   CKD associated anemia responding to Procrit.   Fahad has benign prostatic hypertrophy (BPH) that is stable on Flomax.   Orthostatic hypotension resolved with adjustment of metoprolol by Dr. Alexis   He takes Cozaar, metoprolol, Lasix and spironolactone without new cardiac issues.   Protonix continues without any new exacerbations of GERD.   Lower extremity edema overall has actually improved to some degree             FOR DETAILED TUMOR AND HEMATOLOGICAL HISTORIES COPIED FROM MY OFFICE RECORDS SEE 7/2/2024 consult            REVIEW OF SYSTEMS:   Constitutional: no fever, no night sweats, no fatigue;   HEENT: Left eye blindness                              Lungs: Episodic shortness of breath and right-sided pleuritic like chest pain where he requires oxygen about 3 to 4 AM several days in a row prior to his admission with resolution until on 6/29/2024, this did not resolve.  He came to the ED at [facility] for that.  CVS: no palpitation, no chest pain, no shortness of breath  GI: no abdominal pain, no nausea , no vomiting, no constipation  SHELBIE: no dysuria, frequency and urgency, no hematuria, no kidney stones  Musculoskeletal: no joint pain, swelling , stiffness  Endocrine: no polyuria, polydipsia, no cold or heat intolerance  Hematology: no anemia, no easy bruising or bleeding, no history of clotting  disorder  Dermatology: no skin rash, no eczema, no pruritus  Psychiatry: no depression, no anxiety,no panic attacks, no suicide ideation  Neurology: no syncope, no seizures, no numbness or tingling of hands, no numbness or tingling of feet, no paresis    Objective     Vital Signs  Temp:  [97.6 °F (36.4 °C)-97.8 °F (36.6 °C)] 97.6 °F (36.4 °C)  Heart Rate:  [72-89] 77  Resp:  [18] 18  BP: (104-134)/(69-81) 134/81    Intake/Output Summary (Last 24 hours) at 7/4/2024 0849  Last data filed at 7/4/2024 0356  Gross per 24 hour   Intake 690 ml   Output --   Net 690 ml       PHYSICAL EXAM:  CONSTITUTIONAL: Alert, appropriate, no acute distress  EYES: Anicteric, left eye blindness  ENT: Mucous membranes moist, no oropharyngeal lesions   NECK: Supple, no masses   CHEST/LUNGS: Chronic appearing rales and crackles    CARDIOVASCULAR: RRR, no murmurs  ABDOMEN: soft nontender, active bowel sounds, no HSM  EXTREMITIES: warm, moves all fours  SKIN: warm, dry with no rashes or lesions  LYMPH: No cervical, clavicular, axillary, or inguinal lymphadenopathy  NEUROLOGIC: follows commands, nonfocal   PSYCH: mood and affect appropriate      CBC  Results from last 7 days   Lab Units 07/04/24  0432 07/03/24  0405 07/02/24  0109   WBC 10*3/mm3 9.34 10.33 8.67   HEMOGLOBIN g/dL 13.0 13.3 13.9   HEMATOCRIT % 41.1 40.6 43.4   PLATELETS 10*3/mm3 160 176 187       CMP  Lab Results   Component Value Date    GLUCOSE 107 (H) 07/04/2024    BUN 48 (H) 07/04/2024    CREATININE 2.34 (H) 07/04/2024    EGFRIFNONA 29 (L) 08/27/2021    BCR 20.5 07/04/2024    CO2 23.0 07/04/2024    CALCIUM 10.0 07/04/2024    ALBUMIN 3.6 07/02/2024    AST 24 07/02/2024    ALT 21 07/02/2024       Results from last 7 days   Lab Units 06/29/24  1027   INR  1.04   APTT seconds 31.4       Blood Culture   Date Value Ref Range Status   06/29/2024 No growth at 3 days  Preliminary   06/29/2024 No growth at 3 days  Preliminary       Imaging Results (Last 72 Hours)       Procedure  Component Value Units Date/Time    US Venous Doppler Lower Extremity Bilateral (duplex) [[id number]] Collected: 07/01/24 1325     Updated: 07/01/24 1328    Narrative:      History: Swelling       Impression:      Impression: There is no evidence of deep venous thrombosis or  superficial thrombophlebitis of right or left lower extremities.     Comments: Bilateral lower extremity venous duplex exam was performed  using color Doppler flow, Doppler waveform analysis, and grayscale  imaging, with and without compression. There is no evidence of deep  venous thrombosis in the common femoral, superficial femoral, popliteal,  peroneal, anterior tibial, and posterior tibial veins bilaterally. No  thrombus is identified in the saphenofemoral junctions and greater  saphenous veins bilaterally.            This report was signed and finalized on 7/1/2024 1:25 PM by Dr. Sidney Connors MD.                 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     He has a remote history of a pancreatic mass identified and biopsied by Dr. Alexis on 10/29/03.  Pathology negative on open biopsy.    Fahad completed 4 cycles of combination chemotherapy with carboplatin, Keytruda, Alimta on 7/5/2019.   Maintenance Keytruda and Alimta every 3 weeks was delivered.   The final cycle number #25 of Keytruda/Alimta was delivered on 10/29/2020.  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    Imaging studies with CT scan of the chest on 11/9/2023 and a follow-up PET scan on 12/7/2023 documented localized progressive disease in the RUL of the lung.  He was referred to Dr. Danny Cool at [facility] for SBRT.    SBRT by Dr. Danny Cool was delivered in 5 treatment fractions for a total dose of 5000 cGy. XRT was initiated 1/24/2024 and was completed on 2/7/2024    Imaging were performed on 3/20/2024 to assess response to treatment and restaging on a chemotherapy.     CT CHEST WO CONTRAST AT [facility] on 3/20/2024: Compared to 11/08/2023  1.3 cm partially calcified subcarinal lymph node, unchanged.   2.0 x 1.5 x 1.9 cm right upper lobe mass, previously 2.0 x 2.5 x 1.9 cm   1.8 x 3.0 x 2.0 right upper lobe mass, previously 3.5 x 1.9 x 1.6 cm   Emphysematous changes with subpleural reticulations and possible honeycombing and fibrosis are noted with pleural thickening again observed along the right lateral chest wall extending to the diaphragmatic surface.   There are scattered subpleural calcifications     CT ABDOMEN PELVIS WO CONTRAST AT [facility] on 3/20/2024: compared to 11/08/2023  No evidence of metastasis in the abdomen/pelvis.      Mr. Prater was admitted on 6/29/2024 with chest pain on exertion elevated troponin level for cardiac evaluation and management.  In workup, imaging studies have been performed and medical oncology consultation requested.     CT scan of the chest without contrast at [facility] on 6/29/2024 was compared to 5/6/2024 reported the following  1. A spiculated mass in the right upper lobe laterally is larger on the   current study.   2. There is consolidation around a previously described nodule in the   right lung apex likely related to posttreatment change.   3. Diffuse  "reticulonodular fibrotic changes throughout both lungs.   Emphysema.   4. Pleural thickening on the right that is more focal in the right lung   base laterally. Metastatic pleural disease is considered.   5. Ectasia of the ascending thoracic aorta measuring 4.3 cm. Main   pulmonary artery segment is dilated measuring 3.6 cm. Mild cardiomegaly. ...      CT scan [facility] the abdomen pelvis at [facility] on 2024 was compared to 2023 and reported the followin. There is a mildly dilated proximal small bowel loop measuring 4 cm.  No other small bowel distention is seen. This could be transient or due  to partial obstruction. Gastric wall thickening likely related to under  distention. There is under distention of portions of the colon and  moderate stool in the colon.  2. 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  Fibrosis in the lung bases with emphysema.  3. Left renal cyst. A 6 mm nonobstructive renal stone lower pole on the  left.  4. Mildly enlarged prostate measuring 4.7 cm.  5. Air within the biliary tree likely related to prior sphincterotomy.  Prior cholecystectomy.  6. Coarsening of the trabecula in the right femoral neck and  trochanteric region could be related to early Paget's disease. There is  no cortical thickening. A lipo sclerosing myxofibrous tumor is felt to  be less likely as there is no sclerotic margin.     Definitive XRT with SBRT by Dr. Danny Cool was delivered in 5 treatment fractions for a total dose of 5000 cGy. XRT was initiated 2024 and was completed on 2024.      I will discuss imaging studies with Dr. Cool, but in my estimation the findings in the RUL on the CT scan from 2024 probably represents the area previously radiated and can be followed conservatively.    I discussed the case with Dr. Danny Cool.  Dr. Cool was gracious enough to review imaging studies with the following assessment by Dr. Danny Cool:  \"I have reviewed the CT scan of " "the thorax on this admission for atrial fibrillation with rapid ventricular response.     He completed recent SBRT radiotherapy for 2 right lung nodules on February 7, 2024.     Review of the current CT scan appears consistent with postradiation change.  We cannot entirely rule out progression however this appears unlikely with the chronology and radiographic appearance.     He is scheduled to return to our department in approximately 1 month with follow-up CT scan of the thorax and we will follow this closely with serial CT scans of the thorax.\"    Dr Michele has rescheduled Mr. Prater to see me in follow-up on 9/18/2024 at 10:30 AM    He completed recent SBRT radiotherapy for 2 right lung nodules on February 7, 2024.     #2  Atrial fibrillation with RVR  Rate controlled.  Per cardiology    Cardio recs:  -D/c on Multaq 400 mg twice daily  -No anticoagulation at this time as above  -FU cardio    Chon Rico MD  07/04/24  08:49 CDT        "

## 2024-07-04 NOTE — PROGRESS NOTES
"EP Problems:  1.  Paroxysmal SVT  2.  Wide-complex tachycardia  3.  Paroxysmal atrial fibrillation     Cardiology Problems:  1.  Hypertension     Medical Problems:  1.  Metastatic lung cancer  2.  Pneumothorax  3.  COPD on home oxygen  4.  GERD  5.  CKD  6.  Recurrent bleeding duodenal ulcer    Patient ID:  Karoline Prater is a 84 y.o. male with problem list as above as above who EP is following for paroxysmal SVT, wide-complex tachycardia, paroxysmal atrial fibrillation.    Subjective:  No sustained arrhythmias overnight.  Doing well overall.    Objective:  /81 (BP Location: Left arm, Patient Position: Lying)   Pulse 77   Temp 97.6 °F (36.4 °C) (Oral)   Resp 18   Ht 162.6 cm (64\")   Wt 75.5 kg (166 lb 7.2 oz)   SpO2 98%   BMI 28.57 kg/m²     Chronically ill-appearing, no acute distress  Left eye enucleated  Clear to auscultation bilaterally with prolonged expiratory phase  Regular rate and rhythm  Warm, well-perfused    Labs today:  CBC: Hemoglobin 13, WBC 9.3  BMP: Creatinine 2.34, potassium 4.6    Telemetry directly visualized independently reviewed: Sinus rhythm, frequent PACs and occasional PVCs, no sustained arrhythmias    [id number] SCORE   [id number] Score: 5 (7/4/2024  7:17 AM)        Assessment:  Paroxysmal SVT  Wide-complex tachycardia  Paroxysmal atrial fibrillation    Plan:  -Discharge home on Multaq  -EP clinic follow-up  -No additional medication change for now    Part of this note may be an electronic transcription/translation of spoken language to printed text using the Dragon Dictation System.    "

## 2024-07-04 NOTE — DISCHARGE SUMMARY
[facility] Medicine Services  DISCHARGE SUMMARY       Date of Admission: 6/29/2024  Date of Discharge:  7/4/2024  Primary Care Physician: Irving Alexis MD    Presenting Problem/History of Present Illness:      Final Discharge Diagnoses:  Active Hospital Problems    Diagnosis     **Chest pain     Tachycardia     Former smoker     GERD without esophagitis     Acute renal failure superimposed on chronic kidney disease     Fibrotic lung diseases     Stage 3 chronic kidney disease     Adenocarcinoma, lung     Malignant neoplasm of upper lobe of right lung     Dyspnea     Essential hypertension        Consults: Cardiology . EP . Oncology . radiation oncology    Pertinent Test Results:   Results for orders placed during the hospital encounter of 06/29/24    Adult Stress Echo W/ Cont or Stress Agent if Necessary Per Protocol    Interpretation Summary    Overall, this is an intermediate risk test for ischemia.    No ECG evidence of myocardial ischemia. Negative clinical evidence of myocardial ischemia. Findings consistent with a normal ECG stress test.    Abnormal stress echo consistent with an intermediate risk study for myocardial ischemia.    Left ventricular systolic function is normal. Left ventricular ejection fraction appears to be 61 - 65%.    The following left ventricular wall segments are hypokinetic: apical septal and apex hypokinetic.      Imaging Results (All)       Procedure Component Value Units Date/Time    US Venous Doppler Lower Extremity Bilateral (duplex) [[id number]] Collected: 07/01/24 1325     Updated: 07/01/24 1328    Narrative:      History: Swelling       Impression:      Impression: There is no evidence of deep venous thrombosis or  superficial thrombophlebitis of right or left lower extremities.     Comments: Bilateral lower extremity venous duplex exam was performed  using color Doppler flow, Doppler waveform analysis, and grayscale  imaging, with  and without compression. There is no evidence of deep  venous thrombosis in the common femoral, superficial femoral, popliteal,  peroneal, anterior tibial, and posterior tibial veins bilaterally. No  thrombus is identified in the saphenofemoral junctions and greater  saphenous veins bilaterally.            This report was signed and finalized on 7/1/2024 1:25 PM by Dr. Sidney Connors MD.       NM Lung Ventilation Perfusion [[id number]] Collected: 07/01/24 0823     Updated: 07/01/24 0828    Narrative:      EXAMINATION: NM LUNG VENTILATION PERFUSION-     7/1/2024 7:16 AM     HISTORY: chest pain and hypoxia; J18.9-Pneumonia, unspecified organism;  J84.10-Pulmonary fibrosis, unspecified; I51.7-Cardiomegaly; N17.9-Acute  kidney failure, unspecified; N18.9-Chronic kidney disease, unspecified;  R79.89-Other specified abnormal findings of blood chemistry.     After the intravenous injection of 5.4 mCi of technetium 99m  macroaggregated albumin, the images of the lungs were obtained in  anterior, posterior, both oblique and lateral projections with help for  scintillation camera.     The ventilation scans were not obtained.     The comparison is made with the previous study dated 7/23/2020.     Ill-defined, nonsegmental, linear, perfusion defects in both lungs are  similar to the previous study. There are no segmental or subsegmental  perfusion defects noted.       Impression:      1. Low probability of acute pulmonary embolism.        This report was signed and finalized on 7/1/2024 8:25 AM by Dr. Kelly Coleman MD.       CT Chest Without Contrast Diagnostic [[id number]] Collected: 06/29/24 1338     Updated: 06/29/24 1351    Narrative:      EXAMINATION:  CT CHEST WO CONTRAST DIAGNOSTIC-  6/29/2024 10:35 AM     HISTORY: Shortness of breath, increased oxygen, pain in chest and back;  J18.9-Pneumonia, unspecified organism; J84.10-Pulmonary fibrosis,  unspecified; I51.7-Cardiomegaly; N17.9-Acute kidney  failure,  unspecified; N18.9-Chronic kidney disease, unspecified; R79.89-Other  specified abnormal findings of blood chemistry. Lung cancer.     COMPARISON : 5/6/2024.     DLP: 556.65 mGy.cm Automated dosage reduction technique was utilized to  reduce patient dosage.     TECHNIQUE: Spiral CT was performed of the chest without contrast.  Sagittal and coronal images were reconstructed.     MEDIASTINUM, HEART AND VASCULAR STRUCTURES: There is atheromatous  calcification of the thoracic aorta and coronary arteries. The ascending  thoracic aorta is dilated measuring 4.3 cm. The main pulmonary artery  segment is dilated measuring 3.6 cm. There are small mediastinal lymph  nodes many of which demonstrate calcification. There is a 1.4 cm short  axis diameter AP window lymph node that appears stable. There is mild  cardiomegaly.     LUNGS: There is pleural thickening on the right that is most prominent  in the right lung base laterally where the pleural thickness measures  about 2.3 cm. A spiculated right upper lobe lesion laterally measures  4.7 x 2.1 cm, previously 4 x 2.2 cm. Consolidation in the right upper  lobe more superiorly and anteriorly may be related to posttreatment  changes of a previously described spiculated lesion. There are  reticulonodular changes in both lungs likely related to pulmonary  fibrosis. There is interlobular septal thickening. Emphysematous changes  are noted.     UPPER ABDOMEN: Please see the abdomen and pelvis CT report separately.     BONES: There are degenerative changes of the spine. No definite acute  bony abnormality is seen.          Impression:      1. A spiculated mass in the right upper lobe laterally is larger on the  current study.  2. There is consolidation around a previously described nodule in the  right lung apex likely related to posttreatment change.  3. Diffuse reticulonodular fibrotic changes throughout both lungs.  Emphysema.  4. Pleural thickening on the right that is  more focal in the right lung  base laterally. Metastatic pleural disease is considered.  5. Ectasia of the ascending thoracic aorta measuring 4.3 cm. Main  pulmonary artery segment is dilated measuring 3.6 cm. Mild cardiomegaly.     6. Mediastinal lymph nodes appear stable. Multiple lymph nodes are  calcified.        The full report of this exam was immediately signed and available to the  emergency room. The patient is currently in the emergency room.           This report was signed and finalized on 6/29/2024 1:48 PM by Dr. Ramos Lagos MD.       CT Abdomen Pelvis Without Contrast [[id number]] Collected: 06/29/24 1208     Updated: 06/29/24 1230    Narrative:      EXAMINATION:  CT ABDOMEN PELVIS WO CONTRAST-  6/29/2024 10:35 AM     HISTORY: Shortness of breath, increased oxygen, pain in chest and back;  J18.9-Pneumonia, unspecified organism; J84.10-Pulmonary fibrosis,  unspecified; I51.7-Cardiomegaly; N17.9-Acute kidney failure,  unspecified; N18.9-Chronic kidney disease, unspecified; R79.89-Other  specified abnormal findings of blood chemistry.     TECHNIQUE: Spiral CT was performed of the abdomen and pelvis without  contrast. Multiplanar images were reconstructed.     DLP: 556.65 mGy.cm Automated dosage reduction technique was utilized to  reduce patient dosage.     COMPARISON: 1/5/2023.     LUNG BASES: There is coronary artery calcification. There is mild  bilateral gynecomastia. There is focal pleural thickening in the right  lung base laterally. There is fibrosis in the lung bases as well as  emphysema.     LIVER AND SPLEEN: There is air within the biliary tree. No focal liver  lesion is seen. Prior cholecystectomy. The unenhanced spleen is  unremarkable.     PANCREAS: Normal unenhanced appearance of the pancreas.     KIDNEYS AND ADRENALS: The adrenal glands are normal. There are renal  cysts on the left. There is a 6 mm nonobstructive stone in the lower  pole left kidney. The ureters are nondilated. The  urinary bladder is  unremarkable. The prostate measures about 4.7 cm transversely.     BOWEL: Gastric wall thickening likely related to under distention. A  proximal small bowel loop is mildly dilated measuring 4 cm. No other  small bowel distention is seen. The appendix is normal. There is under  distention of portions of the colon. There is moderate stool in the  colon.     OTHER: There is atheromatous disease of the aortoiliac vessels. There  are bilateral fat-containing inguinal hernias. The hernia on the left  extends all the way down to the scrotum. There are degenerative changes  of the spine. There is coarsening of the trabecula in the right femoral  neck and trochanteric region.          Impression:      1. There is a mildly dilated proximal small bowel loop measuring 4 cm.  No other small bowel distention is seen. This could be transient or due  to partial obstruction. Gastric wall thickening likely related to under  distention. There is under distention of portions of the colon and  moderate stool in the colon.  2. 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  Fibrosis in the lung bases with emphysema.  3. Left renal cyst. A 6 mm nonobstructive renal stone lower pole on the  left.  4. Mildly enlarged prostate measuring 4.7 cm.  5. Air within the biliary tree likely related to prior sphincterotomy.  Prior cholecystectomy.  6. Coarsening of the trabecula in the right femoral neck and  trochanteric region could be related to early Paget's disease. There is  no cortical thickening. A lipo sclerosing myxofibrous tumor is felt to  be less likely as there is no sclerotic margin.        The full report of this exam was immediately signed and available to the  emergency room. The patient is currently in the emergency room.     This report was signed and finalized on 6/29/2024 12:27 PM by Dr. Ramos Lagos MD.       XR Chest 1 View [[id number]] Collected: 06/29/24 1033     Updated:  06/29/24 1038    Narrative:      EXAMINATION:  XR CHEST 1 VW-  6/29/2024 9:20 AM     HISTORY: Chest pain and shortness of breath.     COMPARISON: 1/5/2023.     TECHNIQUE: Single view AP image.     FINDINGS: There is pulmonary fibrosis. There is patchy infiltrate in the  right midlung zone. There is stable blunting of the right costophrenic  angle. There is cardiomegaly. There is atheromatous calcification of the  thoracic aorta. There is a port catheter on the right.          Impression:      1. Patchy infiltrate in the right midlung zone could represent an area  of pneumonia.  2. Stable appearing pulmonary fibrosis.  3. Stable blunting of the right costophrenic angle, likely a small  effusion.  4. Cardiomegaly.           This report was signed and finalized on 6/29/2024 10:35 AM by Dr. Ramos Lagos MD.             LAB RESULTS:      Lab 07/04/24 0432 07/03/24 0405 07/02/24 0109 06/30/24 0417 06/29/24  1027   WBC 9.34 10.33 8.67 5.66 7.39   HEMOGLOBIN 13.0 13.3 13.9 12.7* 13.8   HEMATOCRIT 41.1 40.6 43.4 39.6 44.0   PLATELETS 160 176 187 166 181   NEUTROS ABS  --   --  7.74* 3.95 5.49   IMMATURE GRANS (ABS)  --   --  0.15* 0.08* 0.11*   LYMPHS ABS  --   --  0.42* 0.74 0.83   MONOS ABS  --   --  0.35 0.68 0.80   EOS ABS  --   --  0.00 0.17 0.12   MCV 95.6 94.2 95.0 96.6 96.9   PROCALCITONIN  --   --   --   --  0.11   LACTATE  --   --   --   --  1.7   PROTIME  --   --   --   --  14.1   APTT  --   --   --   --  31.4         Lab 07/04/24 0432 07/03/24 0405 07/02/24 0109 06/30/24 0417 06/29/24  1027   SODIUM 139 137 137 136 137   POTASSIUM 4.6 4.6 5.8* 4.9 4.9   CHLORIDE 106 105 106 105 103   CO2 23.0 21.0* 21.0* 22.0 23.0   ANION GAP 10.0 11.0 10.0 9.0 11.0   BUN 48* 46* 45* 55* 61*   CREATININE 2.34* 2.02* 2.05* 2.47* 2.77*   EGFR 27.1* 32.3* 31.8* 25.4* 22.1*   GLUCOSE 107* 110* 136* 83 94   CALCIUM 10.0 10.2 10.2 9.8 10.4   MAGNESIUM  --  1.8  --   --  1.8   HEMOGLOBIN A1C  --   --  6.50*  --   --    TSH   --   --   --   --  2.350         Lab 07/02/24  0109 06/29/24  1027   TOTAL PROTEIN 7.0 7.2   ALBUMIN 3.6 3.8   GLOBULIN 3.4 3.4   ALT (SGPT) 21 21   AST (SGOT) 24 22   BILIRUBIN 0.3 0.4   ALK PHOS 113 119*         Lab 06/30/24  0417 06/29/24  1320 06/29/24  1027   PROBNP  --   --  828.6   HSTROP T 107* 125* 116*   PROTIME  --   --  14.1   INR  --   --  1.04         Lab 07/02/24  0109   CHOLESTEROL 115   LDL CHOL 55   HDL CHOL 35*   TRIGLYCERIDES 143             Lab 06/29/24  1026   FIO2 21   CARBOXYHEMOGLOBIN (VENOUS) 1.1     Brief Urine Lab Results  (Last result in the past 365 days)        Color   Clarity   Blood   Leuk Est   Nitrite   Protein   CREAT   Urine HCG        06/29/24 1028 Yellow   Clear   Negative   Negative   Negative   Negative                 Microbiology Results (last 10 days)       Procedure Component Value - Date/Time    Blood Culture - Blood, Arm, Right [[id number]]  (Normal) Collected: 06/29/24 1105    Lab Status: Preliminary result Specimen: Blood from Arm, Right Updated: 07/03/24 1130     Blood Culture No growth at 4 days    Respiratory Panel PCR w/COVID-19(SARS-CoV-2) JORDYN/MATILDE/BRYAN/PAD/COR/ASHER In-House, NP Swab in UTM/VTM, 2 HR TAT - Swab, Nasopharynx [[id number]]  (Normal) Collected: 06/29/24 1027    Lab Status: Final result Specimen: Swab from Nasopharynx Updated: 06/29/24 1120     ADENOVIRUS, PCR Not Detected     Coronavirus 229E Not Detected     Coronavirus HKU1 Not Detected     Coronavirus NL63 Not Detected     Coronavirus OC43 Not Detected     COVID19 Not Detected     Human Metapneumovirus Not Detected     Human Rhinovirus/Enterovirus Not Detected     Influenza A PCR Not Detected     Influenza B PCR Not Detected     Parainfluenza Virus 1 Not Detected     Parainfluenza Virus 2 Not Detected     Parainfluenza Virus 3 Not Detected     Parainfluenza Virus 4 Not Detected     RSV, PCR Not Detected     Bordetella pertussis pcr Not Detected     Bordetella parapertussis PCR Not Detected      Chlamydophila pneumoniae PCR Not Detected     Mycoplasma pneumo by PCR Not Detected    Narrative:      In the setting of a positive respiratory panel with a viral infection PLUS a negative procalcitonin without other underlying concern for bacterial infection, consider observing off antibiotics or discontinuation of antibiotics and continue supportive care. If the respiratory panel is positive for atypical bacterial infection (Bordetella pertussis, Chlamydophila pneumoniae, or Mycoplasma pneumoniae), consider antibiotic de-escalation to target atypical bacterial infection.    Blood Culture - Blood, Arm, Right [[id number]]  (Normal) Collected: 06/29/24 1027    Lab Status: Preliminary result Specimen: Blood from Arm, Right Updated: 07/03/24 1130     Blood Culture No growth at 4 days          HPI .  84 year old male with past medical history of stage IV lung cancer, A-fib, CKD stage III, hypertension that presents to the emergency room with complaints of mid chest pain for several weeks, radiating to back. Worse and not relieved today decided to come to the ER. Blood work shows elevated troponin, which is not a new finding. Also higher than baseline creatinine.     Hospital Course:   Chest pain/hypertension/hyperlipidemia/CAD.  Aspirin.  Lipitor.  Patient denies any chest pain today.  Troponin trending down.  Imdur ..  Heart rates currently 77 .  Lopressor.  Multaq.  Cozaar.  Stress echo-intermediate finding.    Echocardiogram-Preliminary ejection fraction 55%, official reading still pending.  Consult cardiology-medical management.  Consult EP from cardiology for tachycardia.     Stage IV lung cancer/adenocarcinoma.  Radiation treatment.  Consult radiation oncology.  Consult oncology.  CT of the chest-spiculated mass in the right upper lobe laterally is larger on the  current study, consolidation around a previously described nodule in the  right lung apex likely related to posttreatment change, Diffuse reticulonodular  fibrotic changes throughout both lungs, Emphysema, Pleural thickening on the right that is more focal in the right lung base laterall,. Metastatic pleural disease is considered, Ectasia of the ascending thoracic aorta measuring 4.3 cm- Main pulmonary artery segment is dilated measuring 3.6 cm- Mild cardiomegaly, Mediastinal lymph nodes appear stable- Multiple lymph nodes are calcified.  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 ...      Constipation . Constipation protocol.     Shortness of breath/COPD/emphysema/fibrosis of the lung base.  Patient is on chronic 2 L of oxygen at home.  Singulair.  Cut back prednisone.  VQ scan- Low probability of acute pulmonary embolism. .   Doppler ultrasound lower [id number]-[id number]-Jh thrombus vis in BLE.   Patient is currently on 3 L of oxygen.     Nonobstructive kidney stone.     Acute on chronic stage III renal failure.  Previous creatinine 1/7/2023 2.05.  Slow IV hydration.  Creatinine slight increase.     Hyperkalemia.  Resolved.     Hyperglycemia.  Hemoglobin A1c 6.5.     Gout.  Allopurinol.     Allergic rhinitis.  Flonase.     Reflux . Protonix.  Zofran as needed.     Pain . Ultram as needed.     Prostate hypertrophy.  Flomax.     Insomnia/anxiety.  Melatonin nightly.  Antivert as needed.     Lovenox prophylaxis, renal dosing.     Advanced age.  84 years old.     Nutrition.  Multivitamins.  Cardiac diet.     Blood cultures pending-no growth in 4 days.     Deconditioning.  PT and OT  "consult.     Patient lives at home independently.  Patient gets around with a cane/walker as needed.  Patient refused rehab placement.  Patient refused home health.  Vital signs stable, afebrile.  Plan to discharge patient home today.  Follow-up with his nephrology outpatient.  Follow with PCP outpatient.  Follow with cardiology outpatient.  Dr. Michele outpatient.    Physical Exam on Discharge:  /81 (BP Location: Left arm, Patient Position: Lying)   Pulse 77   Temp 97.6 °F (36.4 °C) (Oral)   Resp 18   Ht 162.6 cm (64\")   Wt 75.5 kg (166 lb 7.2 oz)   SpO2 98%   BMI 28.57 kg/m²   Physical Exam  Vitals and nursing note reviewed.   Constitutional:       Comments: Advanced age.  Chronically ill.   HENT:      Head: Normocephalic and atraumatic.   Eyes:      Conjunctiva/sclera: Conjunctivae normal.      Pupils: Pupils are equal, round, and reactive to light.      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   Cardiovascular:      Rate and Rhythm: Tachycardia.     Heart sounds: Normal heart sounds.   Pulmonary:      Effort: Pulmonary effort is normal. No respiratory distress.      Breath sounds: Normal breath sounds.   Abdominal:      General: Bowel sounds are normal. There is no distension.      Palpations: Abdomen is soft.      Tenderness: There is no abdominal tenderness.   Musculoskeletal:         General: No swelling.      Cervical back: Neck supple.   Skin:     General: Skin is warm and dry.      Capillary Refill: Capillary refill takes 2 to 3 seconds.      Findings: No rash.   Neurological:      General: No focal deficit present.      Mental Status: He is alert and oriented to person, place, and time.      Motor: Weakness present.      Coordination: Coordination abnormal.      Gait: Gait abnormal.   Psychiatric:         Mood and Affect: Mood normal.         Behavior: Behavior normal.         Thought Content: Thought content normal.        Condition on Discharge: Stable.    Discharge Disposition:  Home or Self " Care    Discharge Medications:     Discharge Medications        New Medications        Instructions Start Date   aspirin 81 MG EC tablet   81 mg, Oral, Daily      atorvastatin 20 MG tablet  Commonly known as: LIPITOR   20 mg, Oral, Nightly      dronedarone 400 MG tablet  Commonly known as: MULTAQ   400 mg, Oral, Every 12 Hours Scheduled      isosorbide mononitrate 30 MG 24 hr tablet  Commonly known as: IMDUR   30 mg, Oral, Every 24 Hours Scheduled      predniSONE 20 MG tablet  Commonly known as: DELTASONE   Take 1 tablet by mouth Daily With Breakfast for 4 days, THEN 0.5 tablets Daily With Breakfast for 4 days.   Start Date: July 4, 2024            Changes to Medications        Instructions Start Date   metoprolol succinate XL 50 MG 24 hr tablet  Commonly known as: Toprol XL  What changed:   medication strength  how much to take   50 mg, Oral, Daily             Continue These Medications        Instructions Start Date   acetaminophen 500 MG tablet  Commonly known as: TYLENOL   1,000 mg, Oral, Nightly      allopurinol 100 MG tablet  Commonly known as: ZYLOPRIM   100 mg, Oral, Daily      fluticasone 50 MCG/ACT nasal spray  Commonly known as: FLONASE   1 spray, Nasal, Daily      losartan 50 MG tablet  Commonly known as: COZAAR   50 mg, Oral, Daily      meclizine 12.5 MG tablet  Commonly known as: ANTIVERT   12.5 mg, Oral, 3 Times Daily PRN      melatonin 5 MG tablet tablet   10 mg, Oral, Nightly      montelukast 10 MG tablet  Commonly known as: SINGULAIR   10 mg, Oral, Nightly      multivitamin with minerals tablet tablet   1 tablet, Oral, Daily      pantoprazole 40 MG EC tablet  Commonly known as: Protonix   40 mg, Oral, Daily      sodium bicarbonate 650 MG tablet   650 mg, Oral, 3 Times Daily      tamsulosin 0.4 MG capsule 24 hr capsule  Commonly known as: FLOMAX   1 capsule, Oral, Nightly             Stop These Medications      furosemide 20 MG tablet  Commonly known as: LASIX     spironolactone 25 MG  tablet  Commonly known as: ALDACTONE                Discharge Diet:   Diet Instructions       Advance Diet As Tolerated -Target Diet: Cardiac .      Target Diet: Cardiac .            Activity at Discharge:   Activity Instructions       Activity as Tolerated              Follow-up Appointments:   Future Appointments   Date Time Provider Department Center   8/8/2024  1:00 PM Fidencio Miller APRN [facility]   Patient follow-up PCP 1 week.  Patient follow-up with cardiology outpatient.  Patient follow-up with nephrology outpatient.  Follow-up with Dr. Michele outpatient.      Electronically signed by Zachary Lloyd MD, 07/04/24, 10:28 CDT.    Time: Greater than 30 minutes.

## 2024-07-04 NOTE — CONSULTS
"EP CONSULT NOTE    Subjective        History of Present Illness    EP Problems:  1.  Paroxysmal SVT  2.  Wide-complex tachycardia  3.  Paroxysmal atrial fibrillation    Cardiology Problems:  1.  Hypertension    Medical Problems:  1.  Metastatic lung cancer  2.  Pneumothorax  3.  COPD on home oxygen  4.  GERD  5.  CKD  6.  Recurrent bleeding duodenal ulcer      Karoline Prater is a 84 y.o. male with problem list as above for whom EP is consulted regarding paroxysmal SVT, wide-complex tachycardia.  He initially presented to the hospital on 2024 with shortness of breath, intermittent chest pain, and difficulty urinating.  He was admitted to the hospital at that time and during his course was noted to have episodes of paroxysmal SVT as well as wide-complex tachycardia concerning for ventricular tachycardia.  He did have a noted history of VT from a surgery at [facility] in 2019.    Additionally, he has a documented history of atrial fibrillation in the past.  He is not currently on anticoagulation given a history of recurrent bleeding duodenal ulcer.        Family History:  family history includes Hypertension in his mother; Leukemia in his father.    Social History:  Social History     Tobacco Use    Smoking status: Former     Current packs/day: 0.00     Types: Cigarettes     Start date: 10/29/1954     Quit date: 10/29/2003     Years since quittin.6    Smokeless tobacco: Former     Types: Chew     Quit date:    Vaping Use    Vaping status: Never Used   Substance Use Topics    Alcohol use: No    Drug use: No       Allergies:  Penicillins      Objective   Vital Signs:  /77 (BP Location: Left arm, Patient Position: Lying)   Pulse 72   Temp 97.8 °F (36.6 °C) (Oral)   Resp 18   Ht 162.6 cm (64\")   Wt 75.5 kg (166 lb 7.2 oz)   SpO2 98%   BMI 28.57 kg/m²   Estimated body mass index is 28.57 kg/m² as calculated from the following:    Height as of this encounter: 162.6 cm (64\").    Weight as " of this encounter: 75.5 kg (166 lb 7.2 oz).      Physical Exam  Vitals reviewed.   Constitutional:       Comments: Chronically ill-appearing, wearing oxygen   Eyes:      Comments: Enucleated left eye   Cardiovascular:      Rate and Rhythm: Normal rate and regular rhythm.      Pulses: Normal pulses.      Heart sounds: Normal heart sounds.   Pulmonary:      Effort: Pulmonary effort is normal.      Comments: Prolonged expiratory phase  Musculoskeletal:         General: No swelling.   Neurological:      Mental Status: He is alert and oriented to person, place, and time.   Psychiatric:         Mood and Affect: Mood normal.         Judgment: Judgment normal.          Result Review :  The following data was reviewed by: Carlitos Bowen MD on 06/29/2024:  CMP          7/2/2024    01:09 7/3/2024    04:05 7/4/2024    04:32   CMP   Glucose 136  110  107    BUN 45  46  48    Creatinine 2.05  2.02  2.34    EGFR 31.8  32.3  27.1    Sodium 137  137  139    Potassium 5.8  4.6  4.6    Chloride 106  105  106    Calcium 10.2  10.2  10.0    Total Protein 7.0      Albumin 3.6      Globulin 3.4      Total Bilirubin 0.3      Alkaline Phosphatase 113      AST (SGOT) 24      ALT (SGPT) 21      Albumin/Globulin Ratio 1.1      BUN/Creatinine Ratio 22.0  22.8  20.5    Anion Gap 10.0  11.0  10.0      CBC          7/2/2024    01:09 7/3/2024    04:05 7/4/2024    04:32   CBC   WBC 8.67  10.33  9.34    RBC 4.57  4.31  4.30    Hemoglobin 13.9  13.3  13.0    Hematocrit 43.4  40.6  41.1    MCV 95.0  94.2  95.6    MCH 30.4  30.9  30.2    MCHC 32.0  32.8  31.6    RDW 16.9  16.8  17.1    Platelets 187  176  160      TSH          6/29/2024    10:27   TSH   TSH 2.350        Echocardiogram from today directly visualized independently reviewed: Normal EF    [id number] SCORE   No data recorded           Assessment and Plan   Problems:  Paroxysmal SVT  Wide-complex tachycardia  Paroxysmal atrial fibrillation    Karoline Prater is a 84 y.o. male with problem  list as above for whom EP is consulted regarding paroxysmal SVT, wide-complex tachycardia, paroxysmal atrial fibrillation.  In regards to wide-complex tachycardia, it seems to occur at the same time and at similar rates as paroxysmal SVT.  This is highly suggestive of the same arrhythmic trigger causing paroxysmal SVT with aberrant conduction.  Regardless, without evidence of hemodynamic compromise, nonsustained episodes, and normal EF -there is no indication for more aggressive treatment of the wide-complex tachycardia at this time.  I do think it is appropriate use of beta-blockers is indicated and he is already on metoprolol 25 mg twice daily.  He is going to be hard to get him on a higher dose due to his underlying advanced COPD.    In regards to his atrial fibrillation, he is not currently on anticoagulation given history of recurrent duodenal ulceration.  I do not think that he is a good candidate for watchman given his advanced lung disease and poor overall prognosis otherwise given the adenocarcinoma.  It may be reasonable to start antiarrhythmic therapy both for the suppression of atrial fibrillation as well as these episodes of paroxysmal SVT.  Of the available options, Multaq is likely the most reasonable.  Will plan to start him on 400 mg twice daily for this.    Plan:  -Start Multaq 400 mg twice daily  -No anticoagulation at this time as above  -Awaiting final echocardiogram.  -Okay for discharge home with EP follow-up from my perspective             Part of this note may be an electronic transcription/translation of spoken language to printed text using the Dragon Dictation System.

## 2024-07-04 NOTE — THERAPY DISCHARGE NOTE
Acute Care - Physical Therapy Discharge Summary  [facility]       Patient Name: Karoline Prater  : 1940  MRN: [id number]    Today's Date: 2024                 Admit Date: 2024      PT Recommendation and Plan    Visit Dx:    ICD-10-CM ICD-9-CM   1. Pneumonia of right lung due to infectious organism, unspecified part of lung  J18.9 483.8   2. Pulmonary fibrosis  J84.10 515   3. Cardiomegaly  I51.7 429.3   4. Acute kidney injury superimposed on chronic kidney disease  N17.9 584.9    N18.9 585.9   5. Elevated troponin  R79.89 790.6                PT Rehab Goals       Row Name 24 1500             Transfer Goal 1 (PT)    Activity/Assistive Device (Transfer Goal 1, PT) transfers, all  -AB      [place] Level/Cues Needed (Transfer Goal 1, PT) modified independence  -AB      Time Frame (Transfer Goal 1, PT) by discharge  -AB      Strategies/Barriers (Transfers Goal 1, PT) with O2 > 93% on room air  -AB      Progress/Outcome (Transfer Goal 1, PT) goal not met  -AB         Gait Training Goal 1 (PT)    Activity/Assistive Device (Gait Training Goal 1, PT) gait (walking locomotion)  -AB      [place] Level (Gait Training Goal 1, PT) standby assist  -AB      Distance (Gait Training Goal 1, PT) 250 with O2 > 93% on room air  -AB      Time Frame (Gait Training Goal 1, PT) by discharge  -AB      Progress/Outcome (Gait Training Goal 1, PT) goal not met  -AB         Stairs Goal 1 (PT)    Activity/Assistive Device (Stairs Goal 1, PT) stairs, all skills  -AB      [place] Level/Cues Needed (Stairs Goal 1, PT) standby assist  -AB      Number of Stairs (Stairs Goal 1, PT) 1 with O2 > 93% on room air  -AB      Time Frame (Stairs Goal 1, PT) by discharge  -AB      Progress/Outcome (Stairs Goal 1, PT) goal not met  -AB                User Key  (r) = Recorded By, (t) = Taken By, (c) = Cosigned By      Initials Name Provider Type Discipline    AB Daria Edwards, PTA Physical Therapist Assistant PT                         PT Discharge Summary  Anticipated Discharge Disposition (PT): home with home health  Reason for Discharge: Discharge from facility  Outcomes Achieved: Refer to plan of care for updates on goals achieved  Discharge Destination: Home with assist      Daria Edwards, PTA   7/4/2024

## 2024-07-08 ENCOUNTER — TELEPHONE (OUTPATIENT)
Dept: CARDIOLOGY CLINIC | Age: 84
End: 2024-07-08

## 2024-07-08 LAB
QT INTERVAL: 350 MS
QTC INTERVAL: 413 MS

## 2024-07-08 RX ORDER — METOPROLOL SUCCINATE 50 MG/1
50 TABLET, EXTENDED RELEASE ORAL DAILY
COMMUNITY

## 2024-07-08 RX ORDER — ASPIRIN 81 MG/1
81 TABLET ORAL DAILY
COMMUNITY

## 2024-07-08 NOTE — TELEPHONE ENCOUNTER
Patient was in  in [facility]  for 5 days and patient needs discuss medication. Please called patient.      Thank you

## 2024-07-08 NOTE — TELEPHONE ENCOUNTER
Patient wanted to let office know that he was at [facility] from 6/29-7/4. He states his metoprolol was increased from 25 mg daily to 50 mg daily and asa 81 mg was added. Med list updated.

## 2024-07-11 ENCOUNTER — READMISSION MANAGEMENT (OUTPATIENT)
Dept: CALL CENTER | Facility: HOSPITAL | Age: 84
End: 2024-07-11
Payer: MEDICARE

## 2024-07-11 NOTE — OUTREACH NOTE
Medical Week 1 Survey      Flowsheet Row Responses   [facility] patient discharged from? [place]   Does the patient have one of the following disease processes/diagnoses(primary or secondary)? Other   Week 1 attempt successful? Yes   Call start time 1116   Call end time 1124   Discharge diagnosis Chest pain   Meds reviewed with patient/caregiver? Yes   Is the patient having any side effects they believe may be caused by any medication additions or changes? No   Does the patient have all medications ordered at discharge? Yes   Is the patient taking all medications as directed (includes completed medication regime)? Yes   Does the patient have a primary care provider?  Yes   Does the patient have an appointment with their PCP within 7 days of discharge? Yes   Has the patient kept scheduled appointments due by today? Yes   Has home health visited the patient within 72 hours of discharge? N/A   DME comments home oxygen, wears 2L prn, sats 91-93% on RA   Psychosocial issues? No   Did the patient receive a copy of their discharge instructions? Yes   Nursing interventions Reviewed instructions with patient   What is the patient's perception of their health status since discharge? Improving  [some sob with exertion, slight LE edema]   Is the patient/caregiver able to teach back the hierarchy of who to call/visit for symptoms/problems? PCP, Specialist, Home health nurse, Urgent Care, ED, 911 Yes   Week 1 call completed? Yes   Would this patient benefit from a Referral to Amb Social Work? No   Is the patient interested in additional calls from an ambulatory ? No   Call end time 1124            Sherley COON - Registered Nurse

## 2024-08-02 ENCOUNTER — CLINICAL DOCUMENTATION (OUTPATIENT)
Dept: HEMATOLOGY | Age: 84
End: 2024-08-02

## 2024-08-02 ENCOUNTER — HOSPITAL ENCOUNTER (INPATIENT)
Facility: HOSPITAL | Age: 84
LOS: 3 days | Discharge: HOME | DRG: 190 | End: 2024-08-08
Attending: INTERNAL MEDICINE | Admitting: HOSPITALIST
Payer: MEDICARE

## 2024-08-02 ENCOUNTER — APPOINTMENT (OUTPATIENT)
Dept: GENERAL RADIOLOGY | Facility: HOSPITAL | Age: 84
DRG: 190 | End: 2024-08-02
Payer: MEDICARE

## 2024-08-02 DIAGNOSIS — E87.70 HYPERVOLEMIA, UNSPECIFIED HYPERVOLEMIA TYPE: ICD-10-CM

## 2024-08-02 DIAGNOSIS — Z74.09 IMPAIRED MOBILITY: ICD-10-CM

## 2024-08-02 DIAGNOSIS — R09.02 HYPOXIA: ICD-10-CM

## 2024-08-02 DIAGNOSIS — R60.0 BILATERAL LOWER EXTREMITY EDEMA: Primary | ICD-10-CM

## 2024-08-02 DIAGNOSIS — C34.11 MALIGNANT NEOPLASM OF UPPER LOBE OF RIGHT LUNG: ICD-10-CM

## 2024-08-02 DIAGNOSIS — J44.1 COPD WITH ACUTE EXACERBATION: ICD-10-CM

## 2024-08-02 LAB
ALBUMIN SERPL-MCNC: 3.5 G/DL (ref 3.5–5.2)
ALBUMIN/GLOB SERPL: 1.1 G/DL
ALP SERPL-CCNC: 132 U/L (ref 39–117)
ALT SERPL W P-5'-P-CCNC: 26 U/L (ref 1–41)
ANION GAP SERPL CALCULATED.3IONS-SCNC: 11 MMOL/L (ref 5–15)
AST SERPL-CCNC: 42 U/L (ref 1–40)
B PARAPERT DNA SPEC QL NAA+PROBE: NOT DETECTED
B PERT DNA SPEC QL NAA+PROBE: NOT DETECTED
BASOPHILS # BLD AUTO: 0.03 10*3/MM3 (ref 0–0.2)
BASOPHILS NFR BLD AUTO: 0.5 % (ref 0–1.5)
BILIRUB SERPL-MCNC: 0.4 MG/DL (ref 0–1.2)
BUN SERPL-MCNC: 30 MG/DL (ref 8–23)
BUN/CREAT SERPL: 12.3 (ref 7–25)
C PNEUM DNA NPH QL NAA+NON-PROBE: NOT DETECTED
CALCIUM SPEC-SCNC: 9.6 MG/DL (ref 8.6–10.5)
CHLORIDE SERPL-SCNC: 105 MMOL/L (ref 98–107)
CO2 SERPL-SCNC: 23 MMOL/L (ref 22–29)
CREAT SERPL-MCNC: 2.44 MG/DL (ref 0.76–1.27)
D-LACTATE SERPL-SCNC: 1.4 MMOL/L (ref 0.5–2)
DEPRECATED RDW RBC AUTO: 62.9 FL (ref 37–54)
EGFRCR SERPLBLD CKD-EPI 2021: 25.8 ML/MIN/1.73
EOSINOPHIL # BLD AUTO: 0.13 10*3/MM3 (ref 0–0.4)
EOSINOPHIL NFR BLD AUTO: 2.3 % (ref 0.3–6.2)
ERYTHROCYTE [DISTWIDTH] IN BLOOD BY AUTOMATED COUNT: 17.2 % (ref 12.3–15.4)
FLUAV SUBTYP SPEC NAA+PROBE: NOT DETECTED
FLUBV RNA ISLT QL NAA+PROBE: NOT DETECTED
GEN 5 2HR TROPONIN T REFLEX: 59 NG/L
GLOBULIN UR ELPH-MCNC: 3.2 GM/DL
GLUCOSE SERPL-MCNC: 126 MG/DL (ref 65–99)
HADV DNA SPEC NAA+PROBE: NOT DETECTED
HCOV 229E RNA SPEC QL NAA+PROBE: NOT DETECTED
HCOV HKU1 RNA SPEC QL NAA+PROBE: NOT DETECTED
HCOV NL63 RNA SPEC QL NAA+PROBE: NOT DETECTED
HCOV OC43 RNA SPEC QL NAA+PROBE: NOT DETECTED
HCT VFR BLD AUTO: 35.5 % (ref 37.5–51)
HGB BLD-MCNC: 11 G/DL (ref 13–17.7)
HMPV RNA NPH QL NAA+NON-PROBE: NOT DETECTED
HPIV1 RNA ISLT QL NAA+PROBE: NOT DETECTED
HPIV2 RNA SPEC QL NAA+PROBE: NOT DETECTED
HPIV3 RNA NPH QL NAA+PROBE: NOT DETECTED
HPIV4 P GENE NPH QL NAA+PROBE: NOT DETECTED
IMM GRANULOCYTES # BLD AUTO: 0.06 10*3/MM3 (ref 0–0.05)
IMM GRANULOCYTES NFR BLD AUTO: 1.1 % (ref 0–0.5)
LYMPHOCYTES # BLD AUTO: 0.91 10*3/MM3 (ref 0.7–3.1)
LYMPHOCYTES NFR BLD AUTO: 15.9 % (ref 19.6–45.3)
M PNEUMO IGG SER IA-ACNC: NOT DETECTED
MCH RBC QN AUTO: 31.2 PG (ref 26.6–33)
MCHC RBC AUTO-ENTMCNC: 31 G/DL (ref 31.5–35.7)
MCV RBC AUTO: 100.6 FL (ref 79–97)
MONOCYTES # BLD AUTO: 0.67 10*3/MM3 (ref 0.1–0.9)
MONOCYTES NFR BLD AUTO: 11.7 % (ref 5–12)
NEUTROPHILS NFR BLD AUTO: 3.91 10*3/MM3 (ref 1.7–7)
NEUTROPHILS NFR BLD AUTO: 68.5 % (ref 42.7–76)
NRBC BLD AUTO-RTO: 0 /100 WBC (ref 0–0.2)
NT-PROBNP SERPL-MCNC: 295.5 PG/ML (ref 0–1800)
PLATELET # BLD AUTO: 203 10*3/MM3 (ref 140–450)
PMV BLD AUTO: 11.4 FL (ref 6–12)
POTASSIUM SERPL-SCNC: 5.4 MMOL/L (ref 3.5–5.2)
PROT SERPL-MCNC: 6.7 G/DL (ref 6–8.5)
RBC # BLD AUTO: 3.53 10*6/MM3 (ref 4.14–5.8)
RHINOVIRUS RNA SPEC NAA+PROBE: NOT DETECTED
RSV RNA NPH QL NAA+NON-PROBE: NOT DETECTED
SARS-COV-2 RNA NPH QL NAA+NON-PROBE: NOT DETECTED
SODIUM SERPL-SCNC: 139 MMOL/L (ref 136–145)
TROPONIN T DELTA: -4 NG/L
TROPONIN T SERPL HS-MCNC: 63 NG/L
WBC NRBC COR # BLD AUTO: 5.71 10*3/MM3 (ref 3.4–10.8)

## 2024-08-02 PROCEDURE — 84484 ASSAY OF TROPONIN QUANT: CPT | Performed by: INTERNAL MEDICINE

## 2024-08-02 PROCEDURE — 25010000002 FUROSEMIDE PER 20 MG: Performed by: INTERNAL MEDICINE

## 2024-08-02 PROCEDURE — 94799 UNLISTED PULMONARY SVC/PX: CPT

## 2024-08-02 PROCEDURE — 94664 DEMO&/EVAL PT USE INHALER: CPT

## 2024-08-02 PROCEDURE — 83880 ASSAY OF NATRIURETIC PEPTIDE: CPT | Performed by: INTERNAL MEDICINE

## 2024-08-02 PROCEDURE — 71045 X-RAY EXAM CHEST 1 VIEW: CPT

## 2024-08-02 PROCEDURE — 94640 AIRWAY INHALATION TREATMENT: CPT

## 2024-08-02 PROCEDURE — 80053 COMPREHEN METABOLIC PANEL: CPT | Performed by: INTERNAL MEDICINE

## 2024-08-02 PROCEDURE — 36415 COLL VENOUS BLD VENIPUNCTURE: CPT

## 2024-08-02 PROCEDURE — 0202U NFCT DS 22 TRGT SARS-COV-2: CPT | Performed by: INTERNAL MEDICINE

## 2024-08-02 PROCEDURE — G0378 HOSPITAL OBSERVATION PER HR: HCPCS

## 2024-08-02 PROCEDURE — 85025 COMPLETE CBC W/AUTO DIFF WBC: CPT | Performed by: INTERNAL MEDICINE

## 2024-08-02 PROCEDURE — 83605 ASSAY OF LACTIC ACID: CPT | Performed by: INTERNAL MEDICINE

## 2024-08-02 PROCEDURE — 94761 N-INVAS EAR/PLS OXIMETRY MLT: CPT

## 2024-08-02 PROCEDURE — 99285 EMERGENCY DEPT VISIT HI MDM: CPT

## 2024-08-02 PROCEDURE — 25010000002 HEPARIN (PORCINE) PER 1000 UNITS: Performed by: STUDENT IN AN ORGANIZED HEALTH CARE EDUCATION/TRAINING PROGRAM

## 2024-08-02 RX ORDER — METOPROLOL SUCCINATE 50 MG/1
50 TABLET, EXTENDED RELEASE ORAL DAILY
Status: DISCONTINUED | OUTPATIENT
Start: 2024-08-02 | End: 2024-08-08 | Stop reason: HOSPADM

## 2024-08-02 RX ORDER — FUROSEMIDE 10 MG/ML
20 INJECTION INTRAMUSCULAR; INTRAVENOUS ONCE
Status: COMPLETED | OUTPATIENT
Start: 2024-08-02 | End: 2024-08-02

## 2024-08-02 RX ORDER — ASPIRIN 81 MG/1
81 TABLET ORAL DAILY
Status: DISCONTINUED | OUTPATIENT
Start: 2024-08-02 | End: 2024-08-08 | Stop reason: HOSPADM

## 2024-08-02 RX ORDER — MONTELUKAST SODIUM 10 MG/1
10 TABLET ORAL NIGHTLY
Status: DISCONTINUED | OUTPATIENT
Start: 2024-08-02 | End: 2024-08-08 | Stop reason: HOSPADM

## 2024-08-02 RX ORDER — ACETAMINOPHEN 160 MG/5ML
650 SOLUTION ORAL EVERY 4 HOURS PRN
Status: DISCONTINUED | OUTPATIENT
Start: 2024-08-02 | End: 2024-08-08 | Stop reason: HOSPADM

## 2024-08-02 RX ORDER — PANTOPRAZOLE SODIUM 40 MG/1
40 TABLET, DELAYED RELEASE ORAL DAILY
Status: DISCONTINUED | OUTPATIENT
Start: 2024-08-02 | End: 2024-08-08 | Stop reason: HOSPADM

## 2024-08-02 RX ORDER — BISACODYL 5 MG/1
5 TABLET, DELAYED RELEASE ORAL DAILY PRN
Status: DISCONTINUED | OUTPATIENT
Start: 2024-08-02 | End: 2024-08-08 | Stop reason: HOSPADM

## 2024-08-02 RX ORDER — HEPARIN SODIUM 5000 [USP'U]/ML
5000 INJECTION, SOLUTION INTRAVENOUS; SUBCUTANEOUS EVERY 8 HOURS SCHEDULED
Status: DISCONTINUED | OUTPATIENT
Start: 2024-08-02 | End: 2024-08-05

## 2024-08-02 RX ORDER — ISOSORBIDE MONONITRATE 30 MG/1
30 TABLET, EXTENDED RELEASE ORAL
Status: DISCONTINUED | OUTPATIENT
Start: 2024-08-02 | End: 2024-08-08 | Stop reason: HOSPADM

## 2024-08-02 RX ORDER — MULTIPLE VITAMINS W/ MINERALS TAB 9MG-400MCG
1 TAB ORAL DAILY
Status: DISCONTINUED | OUTPATIENT
Start: 2024-08-02 | End: 2024-08-08 | Stop reason: HOSPADM

## 2024-08-02 RX ORDER — FUROSEMIDE 10 MG/ML
20 INJECTION INTRAMUSCULAR; INTRAVENOUS
Status: DISCONTINUED | OUTPATIENT
Start: 2024-08-03 | End: 2024-08-03

## 2024-08-02 RX ORDER — SODIUM CHLORIDE 0.9 % (FLUSH) 0.9 %
10 SYRINGE (ML) INJECTION AS NEEDED
Status: DISCONTINUED | OUTPATIENT
Start: 2024-08-02 | End: 2024-08-08 | Stop reason: HOSPADM

## 2024-08-02 RX ORDER — AMOXICILLIN 250 MG
2 CAPSULE ORAL 2 TIMES DAILY PRN
Status: DISCONTINUED | OUTPATIENT
Start: 2024-08-02 | End: 2024-08-08 | Stop reason: HOSPADM

## 2024-08-02 RX ORDER — POLYETHYLENE GLYCOL 3350 17 G/17G
17 POWDER, FOR SOLUTION ORAL DAILY PRN
Status: DISCONTINUED | OUTPATIENT
Start: 2024-08-02 | End: 2024-08-08 | Stop reason: HOSPADM

## 2024-08-02 RX ORDER — BISACODYL 10 MG
10 SUPPOSITORY, RECTAL RECTAL DAILY PRN
Status: DISCONTINUED | OUTPATIENT
Start: 2024-08-02 | End: 2024-08-08 | Stop reason: HOSPADM

## 2024-08-02 RX ORDER — SODIUM BICARBONATE 650 MG/1
650 TABLET ORAL 3 TIMES DAILY
Status: DISCONTINUED | OUTPATIENT
Start: 2024-08-02 | End: 2024-08-08 | Stop reason: HOSPADM

## 2024-08-02 RX ORDER — SODIUM CHLORIDE 0.9 % (FLUSH) 0.9 %
10 SYRINGE (ML) INJECTION EVERY 12 HOURS SCHEDULED
Status: DISCONTINUED | OUTPATIENT
Start: 2024-08-02 | End: 2024-08-08 | Stop reason: HOSPADM

## 2024-08-02 RX ORDER — ATORVASTATIN CALCIUM 10 MG/1
20 TABLET, FILM COATED ORAL NIGHTLY
Status: DISCONTINUED | OUTPATIENT
Start: 2024-08-02 | End: 2024-08-08 | Stop reason: HOSPADM

## 2024-08-02 RX ORDER — ACETAMINOPHEN 325 MG/1
650 TABLET ORAL EVERY 4 HOURS PRN
Status: DISCONTINUED | OUTPATIENT
Start: 2024-08-02 | End: 2024-08-08 | Stop reason: HOSPADM

## 2024-08-02 RX ORDER — ACETAMINOPHEN 650 MG/1
650 SUPPOSITORY RECTAL EVERY 4 HOURS PRN
Status: DISCONTINUED | OUTPATIENT
Start: 2024-08-02 | End: 2024-08-08 | Stop reason: HOSPADM

## 2024-08-02 RX ORDER — SODIUM CHLORIDE 9 MG/ML
40 INJECTION, SOLUTION INTRAVENOUS AS NEEDED
Status: DISCONTINUED | OUTPATIENT
Start: 2024-08-02 | End: 2024-08-08 | Stop reason: HOSPADM

## 2024-08-02 RX ADMIN — MONTELUKAST SODIUM 10 MG: 10 TABLET, FILM COATED ORAL at 21:29

## 2024-08-02 RX ADMIN — FUROSEMIDE 20 MG: 10 INJECTION, SOLUTION INTRAVENOUS at 18:06

## 2024-08-02 RX ADMIN — SODIUM BICARBONATE 650 MG: 650 TABLET, ORALLY DISINTEGRATING ORAL at 21:29

## 2024-08-02 RX ADMIN — Medication 10 MG: at 21:29

## 2024-08-02 RX ADMIN — SODIUM ZIRCONIUM CYCLOSILICATE 10 G: 10 POWDER, FOR SUSPENSION ORAL at 21:43

## 2024-08-02 RX ADMIN — Medication 10 ML: at 21:34

## 2024-08-02 RX ADMIN — HEPARIN SODIUM 5000 UNITS: 5000 INJECTION INTRAVENOUS; SUBCUTANEOUS at 21:28

## 2024-08-02 RX ADMIN — ATORVASTATIN CALCIUM 20 MG: 10 TABLET, FILM COATED ORAL at 21:29

## 2024-08-02 RX ADMIN — ALBUTEROL SULFATE 1.25 MG: 2.5 SOLUTION RESPIRATORY (INHALATION) at 22:13

## 2024-08-02 RX ADMIN — DRONEDARONE 400 MG: 400 TABLET, FILM COATED ORAL at 18:39

## 2024-08-02 RX ADMIN — IPRATROPIUM BROMIDE 0.5 MG: 0.5 SOLUTION RESPIRATORY (INHALATION) at 22:12

## 2024-08-02 NOTE — ED PROVIDER NOTES
Subjective   History of Present Illness  84-year-old male who presents emergency department for shortness of breath.  He states that he went outside to move his trash can a couple days ago.  He got short of breath with exertion.  He has had to use his oxygen chronically since that time.  Normally he only use it as needed or with exertion.  He has no chest pain.  He has no nausea.  He notes a little dry cough.  He has no fever.  He has not missed any doses of his anticoagulation.  He was recently diagnosed with pulmonary embolism.    Date of Admission: 6/29/2024  Date of Discharge:  7/4/2024  Primary Care Physician: Irving Alexis MD     Presenting Problem/History of Present Illness:        Final Discharge Diagnoses:       Active Hospital Problems     Diagnosis      **Chest pain      Tachycardia      Former smoker      GERD without esophagitis      Acute renal failure superimposed on chronic kidney disease      Fibrotic lung diseases      Stage 3 chronic kidney disease      Adenocarcinoma, lung      Malignant neoplasm of upper lobe of right lung      Dyspnea      Essential hypertension      Review of Systems   Constitutional:  Negative for chills and fever.   Respiratory:  Positive for shortness of breath.    Cardiovascular:  Positive for chest pain and leg swelling.       Past Medical History:   Diagnosis Date    Arthritis     Atrial fibrillation with rapid ventricular response 05/31/2019    Blindness of left eye     BPH with obstruction/lower urinary tract symptoms     Cancer     stomach & lung    CHF (congestive heart failure)     CKD (chronic kidney disease) stage 3, GFR 30-59 ml/min     Coronary artery disease     Elevated cholesterol     Essential hypertension 10/02/2017    Fibrotic lung diseases     GERD (gastroesophageal reflux disease)     Hearing loss     History of transfusion     Hydronephrosis of left kidney     Hyperlipidemia     Hypertension     pt was taken off of all of his medications for BP  (atenolol, lisinopril, lasix) because his BP kept bottoming out so his primary dr told him to discontinue them 1-2 months ago (Jan/Feb 2019). pt states he takes no medications currently.    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 Patel is just going to keep an eye on it and not do surgery right now.    Mediastinal adenopathy 10/24/2018    Station [facility]    Monoclonal gammopathy of unknown significance (MGUS) 09/11/2018    Pancreatic mass     pt states he had this in 2013 but it went away on its own. Now recent CT shows it has come back so he is going to have an ultrasound on 3/13/19.    Shortness of breath        Allergies   Allergen Reactions    Penicillins Hives       Past Surgical History:   Procedure Laterality Date    ABDOMINAL SURGERY      BRONCHOSCOPY N/A 10/24/2018    Procedure: BRONCHOSCOPY WITH BIOPSY, EBUS;  Surgeon: Gareth Becerra MD;  Location: [facility] OR;  Service: Pulmonary    CHOLECYSTECTOMY      COLONOSCOPY N/A 1/3/2019    Procedure: COLONOSCOPY WITH ANESTHESIA;  Surgeon: Randy Somers DO;  Location: [facility] ENDOSCOPY;  Service: Gastroenterology    CYSTOSCOPY, RETROGRADE PYELOGRAM AND STENT INSERTION Left 3/8/2019    Procedure: CYSTOSCOPY RETROGRADE BILATERAL PYELOGRAM;  Surgeon: Jos Sylvester MD;  Location: [facility] OR;  Service: Urology    ENDOSCOPY N/A 12/11/2018    Procedure: ESOPHAGOGASTRODUODENOSCOPY WITH ANESTHESIA;  Surgeon: Randy Somers DO;  Location: [facility] ENDOSCOPY;  Service: Gastroenterology    ENDOSCOPY N/A 4/29/2019    Procedure: ESOPHAGOGASTRODUODENOSCOPY WITH ANESTHESIA;  Surgeon: Lilliam Jj MD;  Location: [facility] OR;  Service: Gastroenterology    ENDOSCOPY N/A 5/9/2019    Procedure: ESOPHAGOGASTRODUODENOSCOPY WITH ANESTHESIA;  Surgeon: Pilo Bansal MD;  Location: [facility] ENDOSCOPY;  Service:  Gastroenterology    EYE SURGERY Left 1964    FOOT SURGERY Right 1966    joint    FRACTURE SURGERY         Family History   Problem Relation Age of Onset    Hypertension Mother     Leukemia Father        Social History     Socioeconomic History    Marital status: Single   Tobacco Use    Smoking status: Former     Current packs/day: 0.00     Types: Cigarettes     Start date: 10/29/1954     Quit date: 10/29/2003     Years since quittin.7    Smokeless tobacco: Former     Types: Chew     Quit date:    Vaping Use    Vaping status: Never Used   Substance and Sexual Activity    Alcohol use: No    Drug use: No    Sexual activity: Defer           Objective   Physical Exam  Vitals reviewed.   Constitutional:       Appearance: He is not ill-appearing.   HENT:      Head: Normocephalic and atraumatic.      Nose: Nose normal.   Eyes:      Conjunctiva/sclera: Conjunctivae normal.   Cardiovascular:      Rate and Rhythm: Normal rate and regular rhythm.      Heart sounds: Normal heart sounds.   Pulmonary:      Effort: Pulmonary effort is normal.      Breath sounds: Examination of the left-lower field reveals rales. Rales present.   Abdominal:      General: Bowel sounds are normal.      Palpations: Abdomen is soft.   Musculoskeletal:         General: No tenderness.      Cervical back: Normal range of motion and neck supple.      Right lower leg: Edema present.      Left lower leg: Edema present.      Comments: +3 pitting LE edema bilaterally.    Skin:     General: Skin is warm and dry.      Coloration: Skin is not cyanotic.   Neurological:      General: No focal deficit present.      Mental Status: He is alert.      Cranial Nerves: No cranial nerve deficit.   Psychiatric:         Mood and Affect: Mood normal. Mood is not anxious.         Procedures       Lab Results (last 24 hours)       Procedure Component Value Units Date/Time    CBC & Differential [[id number]]  (Abnormal) Collected: 24 1604    Specimen: Blood Updated:  08/02/24 1630    Narrative:      The following orders were created for panel order CBC & Differential.  Procedure                               Abnormality         Status                     ---------                               -----------         ------                     CBC Auto Differential[[id number]]        Abnormal            Final result                 Please view results for these tests on the individual orders.    Comprehensive Metabolic Panel [[id number]]  (Abnormal) Collected: 08/02/24 1604    Specimen: Blood Updated: 08/02/24 1649     Glucose 126 mg/dL      BUN 30 mg/dL      Creatinine 2.44 mg/dL      Sodium 139 mmol/L      Potassium 5.4 mmol/L      Comment: Specimen hemolyzed.  Result may be falsely elevated.        Chloride 105 mmol/L      CO2 23.0 mmol/L      Calcium 9.6 mg/dL      Total Protein 6.7 g/dL      Albumin 3.5 g/dL      ALT (SGPT) 26 U/L      Comment: Specimen hemolyzed.  Result may  be falsely elevated.        AST (SGOT) 42 U/L      Comment: Specimen hemolyzed.  Result may be falsely elevated.        Alkaline Phosphatase 132 U/L      Total Bilirubin 0.4 mg/dL      Globulin 3.2 gm/dL      A/G Ratio 1.1 g/dL      BUN/Creatinine Ratio 12.3     Anion Gap 11.0 mmol/L      eGFR 25.8 mL/min/1.73     Narrative:      GFR Normal >60  Chronic Kidney Disease <60  Kidney Failure <15    The GFR formula is only valid for adults with stable renal function between ages 18 and 70.    BNP [[id number]]  (Normal) Collected: 08/02/24 1604    Specimen: Blood Updated: 08/02/24 1646     proBNP 295.5 pg/mL     Narrative:      This assay is used as an aid in the diagnosis of individuals suspected of having heart failure. It can be used as an aid in the diagnosis of acute decompensated heart failure (ADHF) in patients presenting with signs and symptoms of ADHF to the emergency department (ED). In addition, NT-proBNP of <300 pg/mL indicates ADHF is not likely.    Age Range Result Interpretation  NT-proBNP  Concentration (pg/mL:      <50             Positive            >450                   Gray                 300-450                    Negative             <300    50-75           Positive            >900                  Gray                300-900                  Negative            <300      >75             Positive            >1800                  Gray                300-1800                  Negative            <300    High Sensitivity Troponin T [[id number]]  (Abnormal) Collected: 08/02/24 1604    Specimen: Blood Updated: 08/02/24 1649     HS Troponin T 63 ng/L      Comment: Specimen hemolyzed.  Results may be falsely decreased.       Narrative:      High Sensitive Troponin T Reference Range:  <14.0 ng/L- Negative Female for AMI  <22.0 ng/L- Negative Male for AMI  >=14 - Abnormal Female indicating possible myocardial injury.  >=22 - Abnormal Male indicating possible myocardial injury.   Clinicians would have to utilize clinical acumen, EKG, Troponin, and serial changes to determine if it is an Acute Myocardial Infarction or myocardial injury due to an underlying chronic condition.         CBC Auto Differential [[id number]]  (Abnormal) Collected: 08/02/24 1604    Specimen: Blood Updated: 08/02/24 1630     WBC 5.71 10*3/mm3      RBC 3.53 10*6/mm3      Hemoglobin 11.0 g/dL      Hematocrit 35.5 %      .6 fL      MCH 31.2 pg      MCHC 31.0 g/dL      RDW 17.2 %      RDW-SD 62.9 fl      MPV 11.4 fL      Platelets 203 10*3/mm3      Neutrophil % 68.5 %      Lymphocyte % 15.9 %      Monocyte % 11.7 %      Eosinophil % 2.3 %      Basophil % 0.5 %      Immature Grans % 1.1 %      Neutrophils, Absolute 3.91 10*3/mm3      Lymphocytes, Absolute 0.91 10*3/mm3      Monocytes, Absolute 0.67 10*3/mm3      Eosinophils, Absolute 0.13 10*3/mm3      Basophils, Absolute 0.03 10*3/mm3      Immature Grans, Absolute 0.06 10*3/mm3      nRBC 0.0 /100 WBC     Lactic Acid, Plasma [[id number]]  (Normal) Collected: 08/02/24 1608     Specimen: Blood Updated: 08/02/24 1646     Lactate 1.4 mmol/L     Respiratory Panel PCR w/COVID-19(SARS-CoV-2) JORDYN/MATILDE/BRYAN/PAD/COR/ASHER In-House, NP Swab in UTM/VTM, 2 HR TAT - Swab, Nasopharynx [[id number]]  (Normal) Collected: 08/02/24 1610    Specimen: Swab from Nasopharynx Updated: 08/02/24 1711     ADENOVIRUS, PCR Not Detected     Coronavirus 229E Not Detected     Coronavirus HKU1 Not Detected     Coronavirus NL63 Not Detected     Coronavirus OC43 Not Detected     COVID19 Not Detected     Human Metapneumovirus Not Detected     Human Rhinovirus/Enterovirus Not Detected     Influenza A PCR Not Detected     Influenza B PCR Not Detected     Parainfluenza Virus 1 Not Detected     Parainfluenza Virus 2 Not Detected     Parainfluenza Virus 3 Not Detected     Parainfluenza Virus 4 Not Detected     RSV, PCR Not Detected     Bordetella pertussis pcr Not Detected     Bordetella parapertussis PCR Not Detected     Chlamydophila pneumoniae PCR Not Detected     Mycoplasma pneumo by PCR Not Detected    Narrative:      In the setting of a positive respiratory panel with a viral infection PLUS a negative procalcitonin without other underlying concern for bacterial infection, consider observing off antibiotics or discontinuation of antibiotics and continue supportive care. If the respiratory panel is positive for atypical bacterial infection (Bordetella pertussis, Chlamydophila pneumoniae, or Mycoplasma pneumoniae), consider antibiotic de-escalation to target atypical bacterial infection.    High Sensitivity Troponin T 2Hr [[id number]]  (Abnormal) Collected: 08/02/24 1806    Specimen: Blood Updated: 08/02/24 1846     HS Troponin T 59 ng/L      Troponin T Delta -4 ng/L     Narrative:      High Sensitive Troponin T Reference Range:  <14.0 ng/L- Negative Female for AMI  <22.0 ng/L- Negative Male for AMI  >=14 - Abnormal Female indicating possible myocardial injury.  >=22 - Abnormal Male indicating possible myocardial injury.    Clinicians would have to utilize clinical acumen, EKG, Troponin, and serial changes to determine if it is an Acute Myocardial Infarction or myocardial injury due to an underlying chronic condition.               XR Chest 1 View   Final Result   1. A mass in the upper to midlung zone on the right is larger compared   to the prior study measuring in the range of about 4.8 cm.   2. Relatively stable pulmonary fibrosis. Pleural thickening/effusion on   the right also appears relatively stable.   3. Cardiomegaly.           The full report of this exam was immediately signed and available to the   emergency room. The patient is currently in the emergency room.               This report was signed and finalized on 8/2/2024 4:23 PM by Dr. Ramos Lagos MD.                ED Course  ED Course as of 08/02/24 1917   Fri Aug 02, 2024   1915 Hospitalist is agreeable to admission.  [AJ]      ED Course User Index  [AJ] Jen Peñaloza DO                                             Medical Decision Making  DDX: PE, CHF, PNA    Amount and/or Complexity of Data Reviewed  Labs: ordered.     Details: Cbc cmp lactic bnp  Radiology: ordered.     Details: cxr    Risk  Prescription drug management.        Final diagnoses:   Bilateral lower extremity edema   Hypoxia   Hypervolemia, unspecified hypervolemia type       ED Disposition  ED Disposition       ED Disposition   Decision to Admit    Condition   --    Comment   Level of Care: Telemetry [5]   Diagnosis: Bilateral lower extremity edema [437525]   Admitting Physician: NIRANJAN VALDEZ [[id number]]                 No follow-up provider specified.       Medication List      No changes were made to your prescriptions during this visit.            Jen Peñaloza DO  08/02/24 1600       Jen Peñaloza DO  08/02/24 1608       Jen Peñaloza DO  08/02/24 1917

## 2024-08-02 NOTE — PROGRESS NOTES
Mr Horner phoned to report that for past several days he has been wearing 02 at home around the clock (typically only wore at night) because he is having increased difficulty breathing.  He thought it was from the heat, but he has been inside and continues to have SOB when he ambulates,  He states he can recover within a few minutes after sitting down, but is unable to make his bed, take a shower or complete a task without experiencing shortness of air.    Mr Horner called to confirm that it was appropriate for him to call EMS to take him to the hospital to be evaluated,  He denies any fever /chills, but reports productive cough with thick, yellow sputum.    Mr Horner states that his symptoms are getting worse instead of improving.  Instructed to proceed with plan for evaluation at hospital as planned.

## 2024-08-03 ENCOUNTER — APPOINTMENT (OUTPATIENT)
Dept: CT IMAGING | Facility: HOSPITAL | Age: 84
DRG: 190 | End: 2024-08-03
Payer: MEDICARE

## 2024-08-03 ENCOUNTER — APPOINTMENT (OUTPATIENT)
Dept: ULTRASOUND IMAGING | Facility: HOSPITAL | Age: 84
DRG: 190 | End: 2024-08-03
Payer: MEDICARE

## 2024-08-03 PROBLEM — N18.4 STAGE 4 CHRONIC KIDNEY DISEASE: Status: ACTIVE | Noted: 2019-04-27

## 2024-08-03 PROBLEM — I50.32 CHRONIC DIASTOLIC CONGESTIVE HEART FAILURE: Status: ACTIVE | Noted: 2024-08-03

## 2024-08-03 PROBLEM — C34.90 ADENOCARCINOMA, LUNG: Chronic | Status: RESOLVED | Noted: 2019-04-27 | Resolved: 2024-08-03

## 2024-08-03 PROBLEM — J96.21 ACUTE ON CHRONIC RESPIRATORY FAILURE WITH HYPOXIA: Status: ACTIVE | Noted: 2020-07-23

## 2024-08-03 PROBLEM — J44.1 COPD WITH ACUTE EXACERBATION: Status: ACTIVE | Noted: 2024-08-03

## 2024-08-03 PROBLEM — Z92.3 S/P RADIATION > 12 WEEKS: Status: ACTIVE | Noted: 2024-08-03

## 2024-08-03 PROBLEM — C79.9 METASTATIC ADENOCARCINOMA: Status: ACTIVE | Noted: 2024-08-03

## 2024-08-03 LAB
ANION GAP SERPL CALCULATED.3IONS-SCNC: 7 MMOL/L (ref 5–15)
BUN SERPL-MCNC: 27 MG/DL (ref 8–23)
BUN/CREAT SERPL: 11 (ref 7–25)
CALCIUM SPEC-SCNC: 9.3 MG/DL (ref 8.6–10.5)
CHLORIDE SERPL-SCNC: 107 MMOL/L (ref 98–107)
CO2 SERPL-SCNC: 25 MMOL/L (ref 22–29)
CREAT SERPL-MCNC: 2.46 MG/DL (ref 0.76–1.27)
D DIMER PPP FEU-MCNC: 0.8 MCGFEU/ML (ref 0–0.82)
DEPRECATED RDW RBC AUTO: 62.8 FL (ref 37–54)
EGFRCR SERPLBLD CKD-EPI 2021: 25.5 ML/MIN/1.73
ERYTHROCYTE [DISTWIDTH] IN BLOOD BY AUTOMATED COUNT: 17.1 % (ref 12.3–15.4)
GLUCOSE SERPL-MCNC: 86 MG/DL (ref 65–99)
HCT VFR BLD AUTO: 35.4 % (ref 37.5–51)
HGB BLD-MCNC: 10.6 G/DL (ref 13–17.7)
MAGNESIUM SERPL-MCNC: 1.7 MG/DL (ref 1.6–2.4)
MCH RBC QN AUTO: 30 PG (ref 26.6–33)
MCHC RBC AUTO-ENTMCNC: 29.9 G/DL (ref 31.5–35.7)
MCV RBC AUTO: 100.3 FL (ref 79–97)
PLATELET # BLD AUTO: 204 10*3/MM3 (ref 140–450)
PMV BLD AUTO: 11.2 FL (ref 6–12)
POTASSIUM SERPL-SCNC: 4.7 MMOL/L (ref 3.5–5.2)
RBC # BLD AUTO: 3.53 10*6/MM3 (ref 4.14–5.8)
SODIUM SERPL-SCNC: 139 MMOL/L (ref 136–145)
WBC NRBC COR # BLD AUTO: 5.22 10*3/MM3 (ref 3.4–10.8)

## 2024-08-03 PROCEDURE — 93970 EXTREMITY STUDY: CPT

## 2024-08-03 PROCEDURE — 25010000002 BUMETANIDE PER 0.5 MG

## 2024-08-03 PROCEDURE — 83735 ASSAY OF MAGNESIUM: CPT | Performed by: STUDENT IN AN ORGANIZED HEALTH CARE EDUCATION/TRAINING PROGRAM

## 2024-08-03 PROCEDURE — 94799 UNLISTED PULMONARY SVC/PX: CPT

## 2024-08-03 PROCEDURE — 25010000002 METHYLPREDNISOLONE PER 40 MG: Performed by: FAMILY MEDICINE

## 2024-08-03 PROCEDURE — 25010000002 METHYLPREDNISOLONE PER 40 MG

## 2024-08-03 PROCEDURE — 25010000002 MAGNESIUM SULFATE 2 GM/50ML SOLUTION: Performed by: FAMILY MEDICINE

## 2024-08-03 PROCEDURE — G0378 HOSPITAL OBSERVATION PER HR: HCPCS

## 2024-08-03 PROCEDURE — 85379 FIBRIN DEGRADATION QUANT: CPT

## 2024-08-03 PROCEDURE — 93970 EXTREMITY STUDY: CPT | Performed by: SURGERY

## 2024-08-03 PROCEDURE — 99222 1ST HOSP IP/OBS MODERATE 55: CPT | Performed by: INTERNAL MEDICINE

## 2024-08-03 PROCEDURE — 94761 N-INVAS EAR/PLS OXIMETRY MLT: CPT

## 2024-08-03 PROCEDURE — 71250 CT THORAX DX C-: CPT

## 2024-08-03 PROCEDURE — 80048 BASIC METABOLIC PNL TOTAL CA: CPT | Performed by: STUDENT IN AN ORGANIZED HEALTH CARE EDUCATION/TRAINING PROGRAM

## 2024-08-03 PROCEDURE — 85027 COMPLETE CBC AUTOMATED: CPT | Performed by: STUDENT IN AN ORGANIZED HEALTH CARE EDUCATION/TRAINING PROGRAM

## 2024-08-03 PROCEDURE — 94664 DEMO&/EVAL PT USE INHALER: CPT

## 2024-08-03 PROCEDURE — 25010000002 HEPARIN (PORCINE) PER 1000 UNITS: Performed by: STUDENT IN AN ORGANIZED HEALTH CARE EDUCATION/TRAINING PROGRAM

## 2024-08-03 RX ORDER — METHYLPREDNISOLONE SODIUM SUCCINATE 40 MG/ML
40 INJECTION, POWDER, LYOPHILIZED, FOR SOLUTION INTRAMUSCULAR; INTRAVENOUS EVERY 12 HOURS
Status: DISCONTINUED | OUTPATIENT
Start: 2024-08-03 | End: 2024-08-05

## 2024-08-03 RX ORDER — MAGNESIUM SULFATE HEPTAHYDRATE 40 MG/ML
2 INJECTION, SOLUTION INTRAVENOUS ONCE
Status: COMPLETED | OUTPATIENT
Start: 2024-08-03 | End: 2024-08-03

## 2024-08-03 RX ORDER — METHYLPREDNISOLONE SODIUM SUCCINATE 40 MG/ML
40 INJECTION, POWDER, LYOPHILIZED, FOR SOLUTION INTRAMUSCULAR; INTRAVENOUS EVERY 12 HOURS
Status: DISCONTINUED | OUTPATIENT
Start: 2024-08-03 | End: 2024-08-03

## 2024-08-03 RX ORDER — BUMETANIDE 1 MG/1
0.5 TABLET ORAL DAILY
Status: DISCONTINUED | OUTPATIENT
Start: 2024-08-04 | End: 2024-08-05

## 2024-08-03 RX ORDER — METHYLPREDNISOLONE SODIUM SUCCINATE 40 MG/ML
40 INJECTION, POWDER, LYOPHILIZED, FOR SOLUTION INTRAMUSCULAR; INTRAVENOUS DAILY
Status: DISCONTINUED | OUTPATIENT
Start: 2024-08-03 | End: 2024-08-03

## 2024-08-03 RX ORDER — BUDESONIDE AND FORMOTEROL FUMARATE DIHYDRATE 160; 4.5 UG/1; UG/1
2 AEROSOL RESPIRATORY (INHALATION)
Status: DISCONTINUED | OUTPATIENT
Start: 2024-08-03 | End: 2024-08-03

## 2024-08-03 RX ORDER — FUROSEMIDE 20 MG/1
20 TABLET ORAL 2 TIMES DAILY
COMMUNITY
End: 2024-08-08 | Stop reason: HOSPADM

## 2024-08-03 RX ORDER — ALBUTEROL SULFATE 90 UG/1
2 AEROSOL, METERED RESPIRATORY (INHALATION) EVERY 4 HOURS PRN
COMMUNITY

## 2024-08-03 RX ORDER — FLUTICASONE PROPIONATE 50 MCG
2 SPRAY, SUSPENSION (ML) NASAL DAILY
Status: DISCONTINUED | OUTPATIENT
Start: 2024-08-03 | End: 2024-08-08 | Stop reason: HOSPADM

## 2024-08-03 RX ORDER — CETIRIZINE HYDROCHLORIDE 10 MG/1
10 TABLET ORAL DAILY
Status: DISCONTINUED | OUTPATIENT
Start: 2024-08-03 | End: 2024-08-08 | Stop reason: HOSPADM

## 2024-08-03 RX ORDER — BUMETANIDE 0.25 MG/ML
0.5 INJECTION INTRAMUSCULAR; INTRAVENOUS ONCE
Status: COMPLETED | OUTPATIENT
Start: 2024-08-03 | End: 2024-08-03

## 2024-08-03 RX ORDER — BUMETANIDE 0.25 MG/ML
0.5 INJECTION INTRAMUSCULAR; INTRAVENOUS EVERY 12 HOURS
Status: DISCONTINUED | OUTPATIENT
Start: 2024-08-03 | End: 2024-08-03

## 2024-08-03 RX ADMIN — ALBUTEROL SULFATE 1.25 MG: 2.5 SOLUTION RESPIRATORY (INHALATION) at 21:01

## 2024-08-03 RX ADMIN — METHYLPREDNISOLONE SODIUM SUCCINATE 40 MG: 40 INJECTION, POWDER, FOR SOLUTION INTRAMUSCULAR; INTRAVENOUS at 21:53

## 2024-08-03 RX ADMIN — HEPARIN SODIUM 5000 UNITS: 5000 INJECTION INTRAVENOUS; SUBCUTANEOUS at 21:53

## 2024-08-03 RX ADMIN — SODIUM BICARBONATE 650 MG: 650 TABLET, ORALLY DISINTEGRATING ORAL at 15:07

## 2024-08-03 RX ADMIN — HEPARIN SODIUM 5000 UNITS: 5000 INJECTION INTRAVENOUS; SUBCUTANEOUS at 13:48

## 2024-08-03 RX ADMIN — IPRATROPIUM BROMIDE 0.5 MG: 0.5 SOLUTION RESPIRATORY (INHALATION) at 06:07

## 2024-08-03 RX ADMIN — MONTELUKAST SODIUM 10 MG: 10 TABLET, FILM COATED ORAL at 21:52

## 2024-08-03 RX ADMIN — DRONEDARONE 400 MG: 400 TABLET, FILM COATED ORAL at 08:49

## 2024-08-03 RX ADMIN — ISOSORBIDE MONONITRATE 30 MG: 30 TABLET, EXTENDED RELEASE ORAL at 08:49

## 2024-08-03 RX ADMIN — MAGNESIUM SULFATE HEPTAHYDRATE 2 G: 2 INJECTION, SOLUTION INTRAVENOUS at 09:46

## 2024-08-03 RX ADMIN — METHYLPREDNISOLONE SODIUM SUCCINATE 40 MG: 40 INJECTION, POWDER, FOR SOLUTION INTRAMUSCULAR; INTRAVENOUS at 08:56

## 2024-08-03 RX ADMIN — ALBUTEROL SULFATE 1.25 MG: 2.5 SOLUTION RESPIRATORY (INHALATION) at 14:34

## 2024-08-03 RX ADMIN — IPRATROPIUM BROMIDE 0.5 MG: 0.5 SOLUTION RESPIRATORY (INHALATION) at 14:34

## 2024-08-03 RX ADMIN — ALBUTEROL SULFATE 1.25 MG: 2.5 SOLUTION RESPIRATORY (INHALATION) at 10:15

## 2024-08-03 RX ADMIN — BUMETANIDE 0.5 MG: 0.25 INJECTION INTRAMUSCULAR; INTRAVENOUS at 08:55

## 2024-08-03 RX ADMIN — BUMETANIDE 0.5 MG: 0.25 INJECTION INTRAMUSCULAR; INTRAVENOUS at 09:45

## 2024-08-03 RX ADMIN — SODIUM BICARBONATE 650 MG: 650 TABLET, ORALLY DISINTEGRATING ORAL at 08:49

## 2024-08-03 RX ADMIN — Medication 10 ML: at 21:54

## 2024-08-03 RX ADMIN — ATORVASTATIN CALCIUM 20 MG: 10 TABLET, FILM COATED ORAL at 21:52

## 2024-08-03 RX ADMIN — PANTOPRAZOLE SODIUM 40 MG: 40 TABLET, DELAYED RELEASE ORAL at 08:49

## 2024-08-03 RX ADMIN — CETIRIZINE HYDROCHLORIDE 10 MG: 10 TABLET ORAL at 15:07

## 2024-08-03 RX ADMIN — Medication 1 TABLET: at 08:49

## 2024-08-03 RX ADMIN — FLUTICASONE PROPIONATE 2 SPRAY: 50 SPRAY, METERED NASAL at 15:07

## 2024-08-03 RX ADMIN — HEPARIN SODIUM 5000 UNITS: 5000 INJECTION INTRAVENOUS; SUBCUTANEOUS at 06:20

## 2024-08-03 RX ADMIN — Medication 10 ML: at 08:59

## 2024-08-03 RX ADMIN — DRONEDARONE 400 MG: 400 TABLET, FILM COATED ORAL at 21:53

## 2024-08-03 RX ADMIN — ASPIRIN 81 MG: 81 TABLET, COATED ORAL at 08:49

## 2024-08-03 RX ADMIN — ALBUTEROL SULFATE 1.25 MG: 2.5 SOLUTION RESPIRATORY (INHALATION) at 06:07

## 2024-08-03 RX ADMIN — IPRATROPIUM BROMIDE 0.5 MG: 0.5 SOLUTION RESPIRATORY (INHALATION) at 21:01

## 2024-08-03 RX ADMIN — METOPROLOL SUCCINATE 50 MG: 50 TABLET, EXTENDED RELEASE ORAL at 08:49

## 2024-08-03 RX ADMIN — SODIUM BICARBONATE 650 MG: 650 TABLET, ORALLY DISINTEGRATING ORAL at 21:52

## 2024-08-03 RX ADMIN — Medication 10 MG: at 21:54

## 2024-08-03 RX ADMIN — IPRATROPIUM BROMIDE 0.5 MG: 0.5 SOLUTION RESPIRATORY (INHALATION) at 10:15

## 2024-08-03 NOTE — PLAN OF CARE
Goal Outcome Evaluation:  Plan of Care Reviewed With: patient           Outcome Evaluation: pt alert and oriented x 4. Brother will bring pill bottles today. Pt is not sure when he takes his at home meds. He has a list of meds and says that he takes 8 in the morning, 4 in the afternoon and 4 at night but not sure which is which. Pt appeared to have slept well through the night. No c/o pain or discomfort. NSR. Will continue to monitor.

## 2024-08-03 NOTE — PLAN OF CARE
Goal Outcome Evaluation:  Plan of Care Reviewed With: patient        Progress: no change  Outcome Evaluation: No co pain. He is on 2.5L per NC. IV Bumex given this morning with good output. Oncology, Pulm, and palliative was consulted. CT of chest and Venous dopplor done today. Cont to monitor.

## 2024-08-03 NOTE — CONSULTS
MEDICAL ONCOLOGY CONSULTATION    Pt Name: Karoline Prater  MRN: [id number]  YOB: 1940  Date of evaluation: 8/3/2024    REASON FOR CONSULTATION:  Lung cancer  REQUESTING PHYSICIAN:Jorge    History Obtained From:    patient, electronic medical record    HISTORY OF PRESENT ILLNESS:    The patient is a 84 years old male who is well-established in my clinic with Dr. Jeff Michele.  He has a complex medical history and has several comorbidities to include a history of hypertension, chronic respiratory failure, heart failure with preserved EF 61-65%, pulmonary hypertension, history of DVT on anticoagulation, history of GERD, history of stage III CKD.  Patient was recently hospitalized at [facility].  The patient called the clinic on 8/2/2024 with complaints of increased shortness of breath over the last few days.  In addition.  Also complains of bilateral leg swelling.  Patient denies any chest pain.  Denies any hemoptysis.  Denies any fever or chills.  He was seen in the emergency department at [facility].  Troponin was mildly elevated.  proBNP 295.  He received one-time dose of Lasix 20 mg.  Imaging studies as below.  He was admitted to the hospitalist with consultation to oncology.    8/2/2024-chest x-ray-a mass in the upper to midlung zone on the right is larger compared to prior study measuring in the range of about 4.8 cm.  Relatively stable pulmonary fibrosis.  Pleural thickening/effusion on the right also appears relatively stable.  Findings of cardiomegaly.    Most recent CT scans    CT scan of the chest without contrast at [facility] on 6/29/2024 was compared to 5/6/2024 reported the following  1. A spiculated mass in the right upper lobe laterally is larger on the   current study.   2. There is consolidation around a previously described nodule in the   right lung apex likely related to posttreatment change.   3. Diffuse reticulonodular fibrotic changes throughout both lungs.  "  Emphysema.   4. Pleural thickening on the right that is more focal in the right lung   base laterally. Metastatic pleural disease is considered.   5. Ectasia of the ascending thoracic aorta measuring 4.3 cm. Main   pulmonary artery segment is dilated measuring 3.6 cm. Mild cardiomegaly. ...      CT scan of the abdomen pelvis at [facility] on 2024 was compared to 2023 and reported the followin. There is a mildly dilated proximal small bowel loop measuring 4 cm.  No other small bowel distention is seen. This could be transient or due  to partial obstruction. Gastric wall thickening likely related to under  distention. There is under distention of portions of the colon and  moderate stool in the colon.  2. 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  Fibrosis in the lung bases with emphysema.  3. Left renal cyst. A 6 mm nonobstructive renal stone lower pole on the  left.  4. Mildly enlarged prostate measuring 4.7 cm.  5. Air within the biliary tree likely related to prior sphincterotomy.  Prior cholecystectomy.  6. Coarsening of the trabecula in the right femoral neck and  trochanteric region could be related to early Paget's disease. There is  no cortical thickening. A lipo sclerosing myxofibrous tumor is felt to  be less likely as there is no sclerotic margin.    Dr Michele discussed with dr Cool scans findings  Dr. Cool was gracious enough to review imaging studies with the following assessment by Dr. Danny Cool:  \"I have reviewed the CT scan of the thorax on this admission for atrial fibrillation with rapid ventricular response.     He completed recent SBRT radiotherapy for 2 right lung nodules on 2024.     Review of the current CT scan appears consistent with postradiation change.  We cannot entirely rule out progression however this appears unlikely with the chronology and radiographic appearance.     He is scheduled to return to our department in approximately " "1 month with follow-up CT scan of the thorax and we will follow this closely with serial CT scans of the thorax.\"     Dr Michele has rescheduled Mr. Prater to see me in follow-up on 9/18/2024 at 10:30 AM     He completed recent SBRT radiotherapy for 2 right lung nodules on February 7, 2024.     It was felt on visit 7/2/2024 that the overall findings were stable.     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     He has a remote history of a pancreatic mass identified and biopsied by Dr. Alexis on 10/29/03.  Pathology negative on open biopsy.    Fahad completed 4 cycles of combination chemotherapy with carboplatin, Keytruda, Alimta on 7/5/2019.   Maintenance Keytruda and Alimta every 3 weeks was delivered.   The final cycle number #25 of Keytruda/Alimta was delivered on 10/29/2020.  Harry has been on a chemotherapy holiday since October 2020 due to issues of dyspnea and shortness of breath, requiring episodic steroids.     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    Imaging studies with CT scan of the chest on 11/9/2023 and a follow-up PET scan on 12/7/2023 documented localized progressive disease in the RUL of the lung.  He was referred to Dr. Danny Cool at [facility] for SBRT.    SBRT by Dr. Danny Cool was delivered in 5 treatment fractions for a total dose of 5000 cGy. XRT was initiated 1/24/2024 and was completed on 2/7/2024    Imaging were performed on 3/20/2024 to assess response to treatment and restaging on a chemotherapy.     CT CHEST WO CONTRAST AT [facility] on 3/20/2024: Compared to 11/08/2023  1.3 cm partially calcified subcarinal lymph node, unchanged.   2.0 x 1.5 x " 1.9 cm right upper lobe mass, previously 2.0 x 2.5 x 1.9 cm   1.8 x 3.0 x 2.0 right upper lobe mass, previously 3.5 x 1.9 x 1.6 cm   Emphysematous changes with subpleural reticulations and possible honeycombing and fibrosis are noted with pleural thickening again observed along the right lateral chest wall extending to the diaphragmatic surface.   There are scattered subpleural calcifications     CT ABDOMEN PELVIS WO CONTRAST AT [facility] on 3/20/2024: compared to 11/08/2023  No evidence of metastasis in the abdomen/pelvis.         Mr. Prater was admitted on 6/29/2024 with chest pain on exertion elevated troponin level for cardiac evaluation and management.    ACTIVE or ASSOCIATED PROBLEMS:  Pulmonary fibrosis secondary to previous history of smoking and drilling rock for blasting at the [employer]   CKD associated anemia responding to Procrit.   Fahad has benign prostatic hypertrophy (BPH) that is stable on Flomax.   Orthostatic hypotension resolved with adjustment of metoprolol by Dr. Alexis   He takes Cozaar, metoprolol, Lasix and spironolactone without new cardiac issues.   Protonix continues without any new exacerbations of GERD.   Lower extremity edema overall has actually improved to some degree         DETAILED TUMOR AND HEMATOLOGICAL HISTORIES COPIED FROM MY OFFICE RECORDS     TUMOR HISTORY: Adenocarcinoma on the RUL of the lung 11/27/2018  Karoline had CT scans performed for new onset of weight loss of 13 pounds over the previous year , associated with increased fatigue.   He denied respiratory symptoms of shortness of air, cough or productive sputum.    A CT scan of the chest on 9/12/2018 had been ordered by Dr. Irving Alexis due to weight loss and identified a new lobulated right upper lobe 5.7 cm mass that extended back to the right hilum.   Numerous mediastinal lymph nodes ranging in size from mm to 1.3 cm and a subcarnial lymph node that measured 1.5 x 1.5 x 3.5 cm.    A CT scan of the abdomen and pelvis with  contrast on 9/12/2018 documented a 4.9 x 4 x 4 cm soft tissue mass with peripheral calcification immediately adjacent to the gallbladder in the head of the pancreas area.    An MRI of the abdomen and pelvis W & WO on 10/11/2018 identified in the 4.1 x 3.4 cm soft tissue mass in the subhepatic space, abutting the second portion of the duodenum with mild displacement of the duodenum. There does not appear to be involvement of the pancreas, and the pancreas appears within normal limits.  Differential diagnoses include a desmoid tumor, less likely leiomyoma of the wall of the duodenum or malignancy.   Dr. Michele requested a CT-guided biopsy of the right upper lobe mass.   Dr. Rosales, the radiologist, evaluated the area with a repeat CT scan of the chest on 10/11/2018.   He spoke with Dr. Michele indicating that he would not be able to perform the biopsy because the tumor was not accessible, it was under the scapula , which blocked access and therefore the biopsy procedure was canceled.    On the CT scan of the chest on 10/11/2018 measurements of the RUL lung mass was 5.7 x 3.2 cm with irregular margins suspicious for a primary lung neoplasm. Soft tissue associated with the tumor appeared to extend into the bronchus supplying this portion of the RUL of the lung.   Dr. Rosales indicated that the mass had an endobronchial component and should be easily assessable via bronchoscopy.    The chest CT also included a portion of the upper abdomen where a soft tissue mass was described in an addendum report. In the subhepatic space measuring 4.0 x 3.9 cm, displacing the second portion of the duodenum medially.  A referral was made to Dr. Becerra for consideration for bronchoscopy for biopsy.    On 10/24/2018, Dr. Gareth Becerra performed a bronchoscopy with EBUS guided biopsy of a 3 cm subcarinal lymph node along with bronchoalveolar lavage of the posterior segment of the RUL of the lung.  The final pathology of the station 7  lymph node only revealed a background of small mature lymphocytes with clusters of histiocytes suggestive of granuloma.  Negative for malignant cells.  Kinyoun and GMS stains were negative for AFB and fungal organisms respectively.  The bronchial washings were negative for malignant cells as well.     Mr. Prater was referred to Dr. Meenakshi Polanco at [facility] in [place], [place], for navigational bronchoscopy and biopsy under ultrasound.    On 11/27/2018 Dr. Meenakshi Polanco performed a bronchoscopy, navigational protocol, endobronchial ultrasound and biopsy of the RUL of the lung mass along with multiple lymph node station Biopsies.  Pathology revealed the following:  Poorly differentiated Adenocarcinoma from the RUL of the lung mass on biopsy and bronchoalveolar lavage consistent with a lung primary.   All lymph nodes sampled were NEGATIVE for malignant cells including stations 10L, 4L, station 7, 10R, 4R.   IHC studies were positive for CK7, TTF-1 and Napsin A.   IHC studies on tumor cells were negative for CDX2, CK5/6, and p63   Further lung profile molecular testing is pending    All of the above was discussed at length with Mr. Prater at his 12/13/2018 clinic visit.   He was agreeable for referral for consideration of resection of the lung lesion.     He is comfortable with and would like a referral to Dr. Ulysses Bardales ([phone]) at [facility], [address] [place] [address], [address], [place] [address].  Logistics, however, are planning a big part in his decision making and he may decide to have surgery done in the [place] area.    If he decides to have surgery in the [place], referral will be made to Dr. Frandy Patel.     CT chest with contrast 2/18/2019 at [facility] compared to 9/12/2018 revealed a 4.9 x 3.5 cm spiculated RUL lung mass which actually decreased in size from a prior value of 6.1 x 3.6 cm.   Subhepatic mass adjacent to the descending duodenum had increased in size  significantly to 4.3 x 9 cm compared to 4.1 x 3.4 cm on the 10/11/18 MRI of the abdomen.    CT of the abdomen and pelvis without contrast on 3/20/2019 at [facility] was compared to outpatient CT data and pelvis on 9/12/2018 an MRI of the abdomen from 10/11/2018. A soft tissue mass was again encountered in the subhepatic space which abutted the distal stomach and proximal duodenum measuring 8.9 x 5.4 cm which has increased considerably from a prior measurement of 4.1 x 3.4 cm. Medial to the left renal hilum a 3.5 cm lobular mass causing some mild left-sided hydronephrosis.    Mr. Prater was referred to Dr. Frandy Patel who saw him on 1/22/2019 anticipating plans for a curative resection.     Dr. Michele received a phone call on 2/20/2019 from Dr. Patel , indicating that the previously documented an abdominal pancreatic area mass had doubled in size and was causing compression into the left kidney/renal pelvis producing a hydroureter.     Dr. Patel arranged a referral to Dr. Sylvester who will be placing a stent 3/8/2019.    Mr. Prater was scheduled to be seen by gastroenterology at [facility] on 3/13/2019 for EGD/EUS assessment and biopsy of the enlarging peripancreatic/duodenal area mass.  With a decrease in the lung mass and increased size of the subhepatic mass-surgical resection was abandoned at present pending further evaluation of the subhepatic mass.  Dr. Michele discussed treatment options with the unresectable lung mass and the per he pancreatic mass and recommended a combination of Keytruda, Alimta, carboplatin.  He was subsequently admitted to [facility] 4/26 - 4/30/2019 with abdominal pain and melena. WBC pinky was 0.75 with ANC 0.34. PLT did drop to 24,000. He did have duodenal ulcerations that were bleeding on endoscopy. He was stabilized but then readmitted from 5/8 - 5/10/2019 with recurrent melanoma. A repeat endoscopy was performed. It was felt that exacerbation of his bleeding from the duodenal came about by  his thrombocytopenia from treatment. Subsequent dosing was adjusted and Neulasta was added.    CT chest without contrast at [facility] on 6/27/2019 was compared to 2/18/2019 revealed that the right lung mass that extended into the right hilum has decreased from 5.2 x 3.5 down to 4.6 x 3.6. There is increased central cavitation in the mass consistent with tumor necrosis. Mediastinal lymphadenopathy is relatively stable.    CT abdomen and pelvis without contrast at [facility] on 6/27/2019 was compared to 4/26/2019 revealed complete resolution of the previously identified duodenal/subhepatic space soft tissue mass. The previously identified heterogenous enhancing lesion in the left renal pelvis was much less prominent but there does continue to be some mild left caliectasis.    I reviewed the CT results with Dr. Michele on 7/5/2019 who then relayed them as well to Karoline. We've had excellent results from this combination of therapy.    He completed the fourth combination therapy of Keytruda, carbo, Alimta on 7/5/2019 and then transitioned to maintenance Keytruda plus Alimta.    CT chest without contrast at [facility] on 1/9/2020 was compared to 10/17/2019 revealing:   A stable spiculated RUL nodule/mass with similar mediastinal adenopathy.   Questionable right hilar adenopathy with diminished assessment due to lack of IV contrast.   Advanced emphysema with severe pulmonary fibrosis.   No new pulmonary nodules.    CT abdomen and pelvis without contrast at [facility] on 1/9/2020 was compared to 10/17/2019 revealing:   Mildly prominent aortocaval RP lymph nodes with position just below the level of the renal vein worrisome for potential lymphatic metastases. This is similar to 10/17/2019 but increased from 4/26/2019.   Pneumobilia without discrete suspicious focal lesion in the liver.   Wall thickening of the duodenum.   Similar and stable renal lesions favoring cysts.    He has had numerous endoscopies within the past year.   While he was having  an Endo EUS and had cardiac issues and was actually admitted to the intensive care unit.     CT abdomen and pelvis without contrast on 7/16/2020 at [facility] was compared to 1/9/2020 and documented:  1.5 x 0.9 cm aortocaval lymph node, previously 1.9 x 1.2 cm  No new abnormality seen    CT chest without contrast on 11/12/2020 at [facility] ws compared to 1/9/2020 and documented:  Mixed response therapy with interval decrease in size in the RIGHT upper lobe pulmonary nodule but increase in size and mediastinal lymph nodes.  In addition, there is severe emphysema with findings of severe pulmonary fibrosis. Interval worsening of interstitial opacities bilaterally as well as suggestion of some pleural nodularity. Lymphangitic spread of malignancy should be considered.  Small pleural effusion on the RIGHT is new    CT abdomen and pelvis without contrast on 11/12/2020 at [facility] was compared to 7/16/2020 and documented:  The aortic caval node described on the comparison study is not significantly changed in size when measured at the same location.  Nonobstructing renal calculus on the LEFT. No change in the indeterminate renal lesions on the LEFT, likely cysts.  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   CKD associated anemia responding to Procrit.   Fahad has benign prostatic hypertrophy (BPH) that is stable on Flomax.   Orthostatic hypotension resolved with adjustment of metoprolol by Dr. Alexis   He takes Tambocor, metoprolol without new cardiac issues.   Protonix continues without any new exacerbations of GERD.   Lower extremity edema overall has actually improved to some degree.    Fahad received his final cycle #25 of Keytruda/Alimta on 10/29/2020.   Treatment was held since that time due to issues of  dyspnea and shortness of breath.    A course of steroids (prednisone) was initiated and antibiotics also delivered and although his respiratory issues improved, he has continued to have issues with weight loss.    CXR on 12/17/2020 documented no interval change, but in my personal review of the x-ray, there is significant pulmonary fibrosis not significantly changed compared to the x-ray from October 2020.    I discussed the findings on the chest x-ray and the comparison at his clinic visit on 12/30/2020. He does not appear to be improving but rather quite stable.  Vianey has pulmonary fibrosis as documented on a CT scan of the chest without contrast at [facility] on 11/12/2020.  Fahad is in agreement to go on a chemotherapy holiday with a repeat CT scan of the chest in 3 months and monitor his situation clinically and radiographically without systemic intervention going forward.    CT CHEST WO contrast on 3/17/2021, compared to 11/12/2020 at [facility] documented:   Mixed response therapy with interval decrease in size in the RIGHT upper lobe pulmonary nodule but increase in size and mediastinal lymph nodes.  Slight interval decrease in size in the RIGHT upper lobe nodule/mass measuring 4.7 x 2.2 cm today, previously 5.0 x 2.4 cm. RIGHT apical nodular density measuring up to 0.6 cm, previously 0.4 cm.  In addition, there is severe emphysema with findings of severe pulmonary fibrosis. Interval worsening of interstitial opacities bilaterally as well as suggestion of some pleural nodularity. Lymphangitic spread of malignancy should be considered.  Small pleural effusion on the RIGHT is new.    CT ABD & PELVIS WO contrast on 3/17/2021, compared to 11/12/2020, at [facility] documented:  Nonobstructing calculus lower pole left kidney  Low-density lesions associated with the left renal contour probably proteinaceous cyst these are unchanged  Chronic right lateral pleural complex in the lung base with bilateral small basilar pneumothoraces..      XR CHEST (2 VW) 4/21/2021 at [facility] , compared to December 17, 2020, documented:  Advanced interstitial fibrotic changes noted throughout the pulmonary parenchyma unchanged from December 17, 2020.  Small to moderate right lateral pleural complex. This may be pleural fluid or thickening is unchanged December 17, 2020.    XR CHEST (2 VW) 6/16/2021:  2.5 cm area of residual nodularity versus scarring in the RUL zone.  Probable small pleural effusions.  Bilateral interstitial infiltrates stable compared to the 2 most recent studies but increased compared to the 2019 study.   There may beunderlying pulmonary fibrosis that is worsening.   Pulmonary edema is not ruled out.     CT CHEST WO CONTRAST DIAGNOSTIC at [facility] 11/15/2021:Comparison: CT chest without contrast 8/25/2021  3.0 x 2.2 cm spiculated mass in the right upper lobe, stable  There is pleural thickening lateral to the mass with bands of probable fibrosis, stable   4 mm RUL there is a small stellate shaped nodule, previously 5 mm  3 mm nodule in the right lower lobe posterior to the major fissure, unchanged  There is a calcified granuloma in the medial basal segment left lower lobe   Small calcified pleural plaques in the upper left hemithorax as before.  There are several partially calcified mediastinal lymph nodes which appears stable  There is increased pleural thickening along the anterior margin of the right lower lobe.     CT ABDOMEN PELVIS WO CONTRAST at [facility] 11/15/2021:Comparison: CT abdomen pelvis without contrast 3/17/2021  Review of lung windows demonstrates extensive reticular interstitial fibrosis in the lower lung zones, as well as loculated pleural fluid in the right lateral lung base with pleural thickening   10 mm.hyperattenuating exophytic nodule at the inferior pole the left kidney   7 mm nephrolithiasis at the inferior pole the left kidney.  The prostate gland is enlarged  There is grade 1 spondylolisthesis at L5-S1 with disc space collapse.  This is stable    XR CHEST (2 VW) on 3/3/2022 at [facility], compared to 8/16/21, documented:  A 1.7 cm opacity in the right midlung zone appears smaller on the current study, previously measuring 2.5 cm.  Stable blunting of the costophrenic angles right greater than left.  Coarse interstitial lung disease appears relatively stable. Probable interstitial fibrosis    X-RAYS OF THE CHEST at [facility] on 6/29/2022:  The lungs show moderately severe fibrosis and COPD with pleural reaction blunting the right costophrenic sulcus  Small area consolidation noted right upper lobe    X-RAYS OF THE CHEST at [facility] on 9/28/2022:  The right-sided MediPort tip overlying the projection of the superior vena cava  The aorta is calcified and tortuous  The heart is enlarged in size.   There are diffuse interstitial opacities throughout the parenchyma bilaterally with a small right effusion.   There is a nodular density in the right upper lung field.    The RUL abnormality on the chest x-ray from today is likely residual scarring noted on the previous CT scan from November 2021.    CT chest without contrast at [facility] on 11/16/2022:COMPARISON: 11/15/2021, 8/25/2021  9 mm RIGHT upper lobe pulmonary nodule, previously 4 mm  3.0 x 1.9 cm spiculated mass in the RIGHT upper lobe abutting the major minor fissures, unchanged  Chronic interstitial thickening, bronchiectasis, and interstitial fibrosis is similar to multiple prior studies.   Small RIGHT pleural effusion.   No change in a few borderline prominent partially calcified mediastinal lymph nodes    CT ABDOMEN PELVIS WO CONTRAST at [facility] on 11/16/2022:COMPARISON: 11/15/2021   There is mild cardiomegaly with mitral annulus calcifications and coronary artery calcification  Trace pericardial effusion or pericardial thickening is present.  There is interstitial fibrosis within the lung bases with honeycombing.   There is a loculated effusion within the right lateral costophrenic angle stable from the previous  exam  There are cysts involving the left kidney.   More complex exophytic lesions involving the posterior midpole and the left lower pole may represent hemorrhagic cysts.  Anterolisthesis of L4 on L5 and L5 on S1 is present suggesting subluxation at the level of the facets at L4-L5  The prostate gland is mildly enlarged    CT CHEST WO on 11/9/2023 at [facility], compared to 11/16/22  multiple mediastinal lymph nodes, most of which are partially calcified which appear similar in size and number since the prior study. The largest measure up to 1.3 cm short axis subcarinal region and 1.2 cm short axis AP window  2.6 x 1.9 cm anterior right apical nodular consolidation, significantly increased in size, previously 0.9 cm.  3.6 x 1.9 cm Spiculated nodule right upper lobe, unchanged in size  1.2 x 1.1 cm additional perifissural opacity along the anterior minor fissure   Emphysema, bronchial thickening and peripheral reticular opacities present throughout both lungs. Stable emphysema.  Trace amount of pleural fluid lateral right lung base  Degenerative changes present in the spine. No discrete osseous lesion detected    CT ABDOMEN PELVIS WO on 11/9/23 at [facility], compared to 1/5/23  Pneumobilia, increased from prior. This may be seen following sphincterotomy or other hepatobiliary procedures. Recommend correlation with operative history and hepatic enzyme levels. No portal venous gas or bowel pneumatosis  Nonobstructing let nephrolithiasis and unchanged left renal cysts  Moderate quantity of stool throughout the colon    PET CT SKULL BASE TO MID THIGH on 12/13/23, compared to CT 11/08/23  2.6 x 1.9 cm anterior right apical nodular consolidation, SUV 14.8, consistent with malignancy until proven otherwise  3.6 x 1.9 cm RUL spiculated nodule, SUV 5.34, consistent with increased likelihood of malignancy  No additional significantly FDG avid nodules are seen within the lung fields   Right hilar lymph node SUV 3.90  Right suprahilar lymph  node SUV 4.26  Enlarged subcarinal node SUV 2.58  Nonenlarged precarinal node SUV 2.26  AP window node SUV 2.82  Additional smaller/less FDG avid nodes not individually described  In the head and neck, the included portions of the orbits and paranasal sinuses demonstrate normal levels of activity   The examination demonstrates a generally physiologic distribution of activity in the abdomen and pelvis   The examination demonstrates a generally physiologic distribution of activity in the included portions of the skeleton and extremeties     Fahad continues to have chronic dyspnea on exertion associated with pulmonary fibrosis from smoking history 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    Imaging studies with CT scan of the chest on 11/9/2023 and a follow-up PET scan on 12/7/2023 documented progressive disease in the RUL of the lung.  He was referred to Dr. Danny Cool at [facility] for SBRT.    SBRT by Dr. Danny Cool was delivered in 5 treatment fractions for a total dose of 5000 cGy. XRT was initiated 1/24/2024 and was completed on 2/7/2024    CT CHEST WO CONTRAST AT [facility] on 3/20/2024: Compared to 11/08/2023  1.3 cm partially calcified subcarinal lymph node, unchanged.   2.0 x 1.5 x 1.9 cm right upper lobe mass, previously 2.0 x 2.5 x 1.9 cm   1.8 x 3.0 x 2.0 right upper lobe mass, previously 3.5 x 1.9 x 1.6 cm   Emphysematous changes with subpleural reticulations and possible honeycombing and fibrosis are noted with pleural thickening again observed along the right lateral chest wall extending to the diaphragmatic surface.   There are scattered subpleural calcifications     CT ABDOMEN PELVIS WO CONTRAST AT [facility] on 3/20/2024: compared to 11/08/2023  No evidence of metastasis in the abdomen/pelvis.   Multiple left renal cysts. Nonobstructing 4 mm stone at the left lower pole   Mild colonic diverticulosis   No aggressive osseous lesion identified   Multilevel  degenerative spondylosis and mild dextroconvex scoliosis. Grade 1 anterolisthesis at L4-L5 and L5-S1. Right unilateral L5 pars defect.    TREATMENT SUMMARY  Keytruda, carboplatin, Alimta initiated 4/18/2019 to be given every 3 weeks for 4 cycles - 4th cycle 7/5/2019, then Keytruda + Alimta as maintenance to continue indefinitely until progression or intolerable side effects   Fahad received cycle #25 of Keytruda/Alimta on 10/29/2020. He was then placed on an indefinite chemotherapy holiday on 12/30/2020  SBRT by Dr. Danny Cool was delivered in 5 treatment fractions for a total dose of 5000 cGy. XRT was initiated 1/24/2024 and was completed on 2/7/2024          #2   TUMOR HISTORY: Pancreatic mass diagnosed 10/29/03  Mr. Prater presented in October 2003 with obstructive jaundice.   A CT scan of the abdomen on 10/26/2003 documented a 3.2 x 4 x 4.2 cm mass in the head of the pancreas.     On 10/29/03 Dr. Alexis performed a resection of a portion of the head of the pancreas on Fahad Prater along with a Klaudia-en-Y procedure and a choledochojejunostomy for what was felt to be a pancreatic cancer. His pancreas was bypassed because of the findings.  Pathology of the biopsies were negative for malignancy showing a fibrosed pancreas.   Mr. Prater was referred to Dr. Vishal High in [place].    Dr. Vishal High performed ERCP with an EUS and biopsy on 02/26/04   Pathology again was negative for cancer or malignancy.   Routine periodic serologic and radiographic monitoring has not disclosed progression of the mass or development of new malignancy or increase in pancreatic tumor markers.     A CT scan of the abdomen and pelvis with contrast on 9/12/2018 documented a 4.9 x 4 x 4 cm soft tissue mass with peripheral calcification immediately adjacent to the gallbladder in the head of the pancreas area.    An MRI of the abdomen and pelvis W & WO on 10/11/2018 identified in the 4.1 x 3.4 cm soft tissue mass in the subhepatic space,  abutting the second portion of the duodenum with mild displacement of the duodenum. There does not appear to be involvement of the pancreas, and the pancreas appears within normal limits.  Differential diagnoses include a desmoid tumor, less likely leiomyoma of the wall of the duodenum or malignancy.    CT of the abdomen and pelvis without contrast on 3/20/2019 at [facility] was compared to outpatient CT data and pelvis on 9/12/2018 an MRI of the abdomen from 10/11/2018. A soft tissue mass was again encountered in the subhepatic space which abutted the distal stomach and proximal duodenum measuring 8.9 x 5.4 cm which has increased considerably from a prior measurement of 4.1 x 3.4 cm. Medial to the left renal hilum a 3.5 cm lobular mass causing some mild left-sided hydronephrosis.    Mr. Prater was scheduled for an EGD/EUS by Dr. Bryson Moe as an outpatient procedure on 3/13/2019 for assessment and biopsy of the enlarging peripancreatic/duodenal area mass. Unfortunately, after initiation of the procedure, he began having ventricular tachycardia and the procedure had to be aborted. Karoline was transferred to the ICU for cardiology evaluation and stabilization. He remained hospitalized until 3/18/2019 when he was medically cleared for discharge.    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 No emergent urologic intervention was warranted and he received monitoring of renal function. He had a creatinine level of 1.9 at discharge.    PET scan on 4/2/2019 identified a soft tissue mass in the RUL measuring 1.2 x 3.5 cm with extension to the right anterior hilum with an SUV of 10.1. Evidence of mediastinal and hilar lymphadenopathy, with low-level metabolic activity. Interval increase in the size of a large mass in the right upper abdomen inseparable from the duodenum measuring 10.7 x 6.9 cm compared to 5.4 x 8.9 cm  on 2/20/2019 with an SUV of 23.6. Slight increase in 2 small inseparable masses medial to the hilum of the left kidney measuring 2.2 and 2.6 cm and the other measuring 3.9 cm with a maximum SUV of 18.6.    Cycle #1 of palliative chemotherapy with Carboplatin, Alimta and Keytruda was initiated 4/18/19 which should also cover this process.    Abdominal/pelvic CT 4/26/19 was performed at [facility] due to abdominal pain and melena. The RUQ mass, within the duodenum decreased to 6.8 x 6.2 cm (was 10.7 x 6.9 cm on PET 4/2/19)    CT abdomen and pelvis without contrast at [facility] on 6/27/2019 was compared to 4/26/2019 revealed complete resolution of the previously identified. Duodenal/subhepatic space soft tissue mass. The previously identified heterogenous enhancing lesion in the left renal pelvis was much less prominent but there does continue to be some mild left caliectasis.      CT abdomen and pelvis without contrast at [facility] on 1/9/2020 was compared to 10/17/2019 revealing:   Mildly prominent aortocaval RP lymph nodes with position just below the level of the renal vein worrisome for potential lymphatic metastases. This is similar to 10/17/2019 but increased from 4/26/2019.   Pneumobilia without discrete suspicious focal lesion in the liver.   Wall thickening of the duodenum.   Similar and stable renal lesions favoring cysts.    CT ABD & PELVIS WO contrast on 3/17/2021, compared to 11/12/2020, at [facility] documented:  Nonobstructing calculus lower pole left kidney  Low-density lesions associated with the left renal contour probably proteinaceous cyst these are unchanged  Chronic right lateral pleural complex in the lung base with bilateral small basilar pneumothoraces..     He has had numerous endoscopies within the past year. When he was having an Endo EUS he had cardiac issues and required to the intensive care unit.     This area will just be monitored on follow-up scans without further elective endoscopic surveillance..      Moi requests that imaging studies only be done yearly.     CT ABDOMEN PELVIS WO CONTRAST at [facility]- 11/15/2021:Comparison: CT abdomen pelvis without contrast 3/17/2021  Review of lung windows demonstrates extensive reticular interstitial fibrosis in the lower lung zones, as well as loculated pleural fluid in the right lateral lung base with pleural thickening   10 mm.hyperattenuating exophytic nodule at the inferior pole the left kidney   7 mm nephrolithiasis at the inferior pole the left kidney.  The prostate gland is enlarged  There is grade 1 spondylolisthesis at L5-S1 with disc space collapse. This is stable     CT ABDOMEN PELVIS WO CONTRAST at [facility] on 11/16/2022:COMPARISON: 11/15/2021   There is mild cardiomegaly with mitral annulus calcifications and coronary artery calcification  Trace pericardial effusion or pericardial thickening is present.  There is interstitial fibrosis within the lung bases with honeycombing.   There is a loculated effusion within the right lateral costophrenic angle stable from the previous exam  There are cysts involving the left kidney.   More complex exophytic lesions involving the posterior midpole and the left lower pole may represent hemorrhagic cysts.  Anterolisthesis of L4 on L5 and L5 on S1 is present suggesting subluxation at the level of the facets at L4-L5  The prostate gland is mildly enlarged    CT ABDOMEN PELVIS WO on 11/9/23 at [facility], compared to 1/5/23  Pneumobilia, increased from prior. This may be seen following sphincterotomy or other hepatobiliary procedures. Recommend correlation with operative history and hepatic enzyme levels. No portal venous gas or bowel pneumatosis  Nonobstructing let nephrolithiasis and unchanged left renal cysts  Moderate quantity of stool throughout the colon    CT CHEST WO CONTRAST AT [facility] on 3/20/2024: Compared to 11/08/2023  1.3 cm partially calcified subcarinal lymph node, unchanged.   2.0 x 1.5 x 1.9 cm right upper lobe mass, previously  2.0 x 2.5 x 1.9 cm   1.8 x 3.0 x 2.0 right upper lobe mass, previously 3.5 x 1.9 x 1.6 cm   Emphysematous changes with subpleural reticulations and possible honeycombing and fibrosis are noted with pleural thickening again observed along the right lateral chest wall extending to the diaphragmatic surface.   There are scattered subpleural calcifications     CT ABDOMEN PELVIS WO CONTRAST AT [facility] on 3/20/2024: compared to 11/08/2023  No evidence of metastasis in the abdomen/pelvis.   Multiple left renal cysts. Nonobstructing 4 mm stone at the left lower pole   Mild colonic diverticulosis   No aggressive osseous lesion identified   Multilevel degenerative spondylosis and mild dextroconvex scoliosis. Grade 1 anterolisthesis at L4-L5 and L5-S1. Right unilateral L5 pars defect.      #1   HEMATOLOGICAL HISTORY: IgG kappa MGUS- Dx: 02/23/06.  During the course of Mr. Prater’s follow up, an abnormally elevated protein was noted on one occasion, which led to workup including quantitative immunoglobulins.     An elevated IgG kappa was encountered. This has remained stable in the 2500 range since early 2006.     A bone marrow biopsy and aspirate on 02/23/06 was negative with only 4% plasma cells.     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     24 hour urine for immunoelectrophoresis did not reveal an M-spike.  Serology studies on 9/18/2018 documented an elevated, stable IgG of 2589 with an M spike of 0.7.   Immunofixation continued to reveal IgG monoclonal protein with kappa light chain specificity.      #2   HEMATOLOGICAL HISTORY: Iron deficiency anemia, 9/18/2018  Karoline had serology on 9/18/2018 that identified iron deficiency anemia.   His lab work was obtained at [facility].  An iron level was 12, iron saturation 7%, ferritin 256  Folate >20, and vitamin B12 1044.   Dr. Li placed Karoline on ferrous sulfate 325 mg daily on 9/25/2018 , but this failed to resolve the anemia.    An EGD by Dr. Randy Somers on  12/11/2018 documented a normal esophagus, normal stomach and a degree of duodenal deformity.  Gastric biopsy was urease negative.    Colonoscopy by Dr. Randy Somers on 1/9/2019 documented a polyp at 80 cm.  Pathology identified a tubular adenoma, negative for high-grade dysplasia.   Feraheme administered.    Labs on 3/13/2019:  Iron 25   Iron saturation 22%   TIBC 116   Ferritin >2000   Reticulocyte count 0.0578      Haptoglobin 341   Folate >20   Vitamin B12 945   Erythropoietin 13    He presented to [facility] on 4/26/2019 with melena and abdominal discomfort. He was subsequently admitted    EGD per Dr. Jj on 4/29/2019 revealed  LA grade (1 or more mucosal breaks greater than 5 mm, not extending between the tops of the 2 mucosal folds)?esophagitis with no bleeding found in the distal esophagus   Stomach was normal, biopsies for H. pylori taken.   Cardia and gastric fundus were normal on retroflexion   One nonbleeding cratered duodenal ulcer with no stigmata of bleeding was found in the duodenal bulb lesion was about 9 mm.   Many nonbleeding cratered, linear and superficial duodenal ulcers with no stigmata of bleeding were found in the second portion of the duodenum. The largest lesion was 6 mm in largest dimension. No mass encountered and anastomosis not seen.    He was admitted to [facility] on 5/8/2019 with melena, hematochezia, anemia, thrombocytopenia    Endoscopy performed by Dr. Bansal on 5/9/2019 revealed  Normal esophagus   Gastric mucosal variant. 2 erythematous spots in the antrum, ? AVM   Normal examined duodenum   Nothing found to account for symptoms   He developed pancytopenia from chemotherapy and did require transfusions.  His hemoglobin dropped to 7.3 on 6/25/2019 after his last treatment. He received 2 units packed red blood cells as an outpatient.    Serology 6/13/2019  Serum Fe - 117  TIBC - 587  Fe sat - 19.9%  Ferritin - 1,000  Iron levels have been adequately replaced. I'm rechecking some  serology to consider administration of Procrit related to a combination of stage III/IV CKD and chemotherapy related anemia.    TREATMENT SUMMARY  Feraheme 510 mg IV on 2/14 and 2/21/19   Venofer 200 mg IV daily 5/8 - 5/10/2019 as IP at [facility]   s/p 2 units pRBC on 4/26/19 and 2 units pRBC on 4/29/2019 as IP at [facility]?  s/p 2 units pRBC on?5/8/2019   s/p 2 pheresis plts on 5/8/2019  s/p 2 units pRBC as OP 6/26/2019      Past Medical History:    Past Medical History:   Diagnosis Date    Arthritis     Atrial fibrillation with rapid ventricular response 05/31/2019    Blindness of left eye     BPH with obstruction/lower urinary tract symptoms     Cancer     stomach & lung    CHF (congestive heart failure)     CKD (chronic kidney disease) stage 3, GFR 30-59 ml/min     Coronary artery disease     Elevated cholesterol     Essential hypertension 10/02/2017    Fibrotic lung diseases     GERD (gastroesophageal reflux disease)     Hearing loss     History of transfusion     Hydronephrosis of left kidney     Hyperlipidemia     Hypertension     pt was taken off of all of his medications for BP (atenolol, lisinopril, lasix) because his BP kept bottoming out so his primary dr told him to discontinue them 1-2 months ago (Jan/Feb 2019). pt states he takes no medications currently.    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 Patel is just going to keep an eye on it and not do surgery right now.    Mediastinal adenopathy 10/24/2018    [facility]    Monoclonal gammopathy of unknown significance (MGUS) 09/11/2018    Pancreatic mass     pt states he had this in 2013 but it went away on its own. Now recent CT shows it has come back so he is going to have an ultrasound on 3/13/19.    Shortness of breath        Past Surgical History:    Past Surgical History:   Procedure Laterality Date     ABDOMINAL SURGERY      BRONCHOSCOPY N/A 10/24/2018    Procedure: BRONCHOSCOPY WITH BIOPSY, EBUS;  Surgeon: Gareth Becerra MD;  Location: [facility] OR;  Service: Pulmonary    CHOLECYSTECTOMY      COLONOSCOPY N/A 1/3/2019    Procedure: COLONOSCOPY WITH ANESTHESIA;  Surgeon: Randy Somers DO;  Location: [facility] ENDOSCOPY;  Service: Gastroenterology    CYSTOSCOPY, RETROGRADE PYELOGRAM AND STENT INSERTION Left 3/8/2019    Procedure: CYSTOSCOPY RETROGRADE BILATERAL PYELOGRAM;  Surgeon: Jos Sylvester MD;  Location: [facility] OR;  Service: Urology    ENDOSCOPY N/A 2018    Procedure: ESOPHAGOGASTRODUODENOSCOPY WITH ANESTHESIA;  Surgeon: Randy Somers DO;  Location:  [facility] ENDOSCOPY;  Service: Gastroenterology    ENDOSCOPY N/A 2019    Procedure: ESOPHAGOGASTRODUODENOSCOPY WITH ANESTHESIA;  Surgeon: Lilliam Jj MD;  Location: [facility] OR;  Service: Gastroenterology    ENDOSCOPY N/A 2019    Procedure: ESOPHAGOGASTRODUODENOSCOPY WITH ANESTHESIA;  Surgeon: Pilo Bansal MD;  Location: [facility] ENDOSCOPY;  Service: Gastroenterology    EYE SURGERY Left 1964    FOOT SURGERY Right 1966    joint    FRACTURE SURGERY         Social History:    Social History     Socioeconomic History    Marital status: Single   Tobacco Use    Smoking status: Former     Current packs/day: 0.00     Types: Cigarettes     Start date: 10/29/1954     Quit date: 10/29/2003     Years since quittin.7    Smokeless tobacco: Former     Types: Chew     Quit date:    Vaping Use    Vaping status: Never Used   Substance and Sexual Activity    Alcohol use: No    Drug use: No    Sexual activity: Defer       Family History:   Family History   Problem Relation Age of Onset    Hypertension Mother     Leukemia Father        Current Hospital Medications:      Current Facility-Administered Medications:     acetaminophen (TYLENOL) tablet 650 mg, 650 mg, Oral, Q4H PRN **OR** acetaminophen (TYLENOL) 160 MG/5ML oral solution 650 mg, 650  mg, Oral, Q4H PRN **OR** acetaminophen (TYLENOL) suppository 650 mg, 650 mg, Rectal, Q4H PRN, Kaylie Garcia MD    albuterol (PROVENTIL) nebulizer solution 0.5% 2.5 mg/0.5mL, 1.25 mg, Nebulization, 4x Daily - RT, 1.25 mg at 08/03/24 1015 **AND** ipratropium (ATROVENT) nebulizer solution 0.5 mg, 0.5 mg, Nebulization, 4x Daily - RT, Kaylie Garcia MD, 0.5 mg at 08/03/24 1015    aspirin EC tablet 81 mg, 81 mg, Oral, Daily, Kaylie Garcia MD, 81 mg at 08/03/24 0849    atorvastatin (LIPITOR) tablet 20 mg, 20 mg, Oral, Nightly, Kaylie Garcia MD, 20 mg at 08/02/24 2129    sennosides-docusate (PERICOLACE) 8.6-50 MG per tablet 2 tablet, 2 tablet, Oral, BID PRN **AND** polyethylene glycol (MIRALAX) packet 17 g, 17 g, Oral, Daily PRN **AND** bisacodyl (DULCOLAX) EC tablet 5 mg, 5 mg, Oral, Daily PRN **AND** bisacodyl (DULCOLAX) suppository 10 mg, 10 mg, Rectal, Daily PRN, Kaylie Garcia MD    [START ON 8/4/2024] bumetanide (BUMEX) tablet 0.5 mg, 0.5 mg, Oral, Daily, Farida, MARITZA    Calcium Replacement - Follow Nurse / BPA Driven Protocol, , Does not apply, PRN, Kaylie Garcia MD    dronedarone (MULTAQ) tablet 400 mg, 400 mg, Oral, Q12H, Kaylie Garcia MD, 400 mg at 08/03/24 0849    heparin (porcine) 5000 UNIT/ML injection 5,000 Units, 5,000 Units, Subcutaneous, Q8H, Kaylie Garcia MD, 5,000 Units at 08/03/24 0620    isosorbide mononitrate (IMDUR) 24 hr tablet 30 mg, 30 mg, Oral, Q24H, Kaylie Garcia MD, 30 mg at 08/03/24 0849    Magnesium Cardiology Dose Replacement - Follow Nurse / BPA Driven Protocol, , Does not apply, PRN, Kaylie Garcia MD    magnesium sulfate 2g/50 mL (PREMIX) infusion, 2 g, Intravenous, Once, Zachary Lloyd MD, 2 g at 08/03/24 0946    melatonin tablet 10 mg, 10 mg, Oral, Nightly, Kaylie Garcia MD, 10 mg at 08/02/24 2129    methylPREDNISolone sodium succinate (SOLU-Medrol) injection 40 mg, 40 mg, Intravenous, Daily, Farida APRN, 40 mg at 08/03/24 0856    metoprolol  succinate XL (TOPROL-XL) 24 hr tablet 50 mg, 50 mg, Oral, Daily, Kaylie Garcia MD, 50 mg at 08/03/24 0849    montelukast (SINGULAIR) tablet 10 mg, 10 mg, Oral, Nightly, Kaylie Garcia MD, 10 mg at 08/02/24 2129    multivitamin with minerals 1 tablet, 1 tablet, Oral, Daily, Kaylie Garcia MD, 1 tablet at 08/03/24 0849    pantoprazole (PROTONIX) EC tablet 40 mg, 40 mg, Oral, Daily, Kaylie Garcia MD, 40 mg at 08/03/24 0849    Phosphorus Replacement - Follow Nurse / BPA Driven Protocol, , Does not apply, PRN, Kaylie Garcia MD    Potassium Replacement - Follow Nurse / BPA Driven Protocol, , Does not apply, PRN, Kaylie Garcia MD    sodium bicarbonate tablet 650 mg, 650 mg, Oral, TID, Kaylie Garcia MD, 650 mg at 08/03/24 0849    sodium chloride 0.9 % flush 10 mL, 10 mL, Intravenous, Q12H, Kaylie Garcia MD, 10 mL at 08/03/24 0859    sodium chloride 0.9 % flush 10 mL, 10 mL, Intravenous, PRN, Kaylie Garcia MD    sodium chloride 0.9 % infusion 40 mL, 40 mL, Intravenous, PRN, Kaylie Garcia MD      Allergies:   Allergies   Allergen Reactions    Penicillins Hives         Subjective   REVIEW OF SYSTEMS:   CONSTITUTIONAL: no fever, no night sweats, generalized fatigue;  HEENT: no blurring of vision, no double vision, no hearing difficulty, no tinnitus, no ulceration, no dysplasia, no epistaxis;  LUNGS: no cough, no hemoptysis, no wheeze,   shortness of breath;  CARDIOVASCULAR: no palpitation, no chest pain, shortness of breath;  GI: no abdominal pain, no nausea, no vomiting, no diarrhea, no constipation;  SHELBIE: no dysuria, no hematuria, no frequency or urgency, no nephrolithiasis;  MUSCULOSKELETAL: no joint pain, no swelling, no stiffness;  ENDOCRINE: no polyuria, no polydipsia, no cold or heat intolerance;  HEMATOLOGY: no easy bruising or bleeding, no history of clotting disorder;  DERMATOLOGY: no skin rash, no eczema, no pruritus;  NEUROLOGY: no syncope, no seizures, no numbness or tingling of hands, no numbness  "or tingling of feet, no paresis;    Objective   /72 (BP Location: Right arm, Patient Position: Sitting)   Pulse 83   Temp 97.7 °F (36.5 °C) (Oral)   Resp 16   Ht 162.6 cm (64.02\")   Wt 77.1 kg (170 lb)   SpO2 94%   BMI 29.17 kg/m²     PHYSICAL EXAM:  CONSTITUTIONAL: Alert, appropriate, no acute distress, looks ill appearing, frail  EYES: Anicteric, EOM intact, pupils equal round   ENT: Mucous membranes moist, no oral pharyngeal lesions, external inspection of ears and nose are normal  NECK: Supple, no masses.  No palpable thyroid mass  CHEST/LUNGS: CTA bilaterally, normal respiratory effort   CARDIOVASCULAR: RRR, no murmurs.  No lower extremity edema  ABDOMEN: soft nontender, active bowel sounds, no HSM.  No palpable masses  EXTREMITIES: warm, full ROM in all 4 extremities, no focal weakness.  SKIN: warm, dry with no rashes or lesions  LYMPH: No cervical, clavicular, axillary, or inguinal lymphadenopathy  NEUROLOGIC: follows commands, nonfocal         LABORATORY RESULTS REVIEWED BY ME:  Lab Results   Component Value Date    WBC 5.22 08/03/2024    HGB 10.6 (L) 08/03/2024    HCT 35.4 (L) 08/03/2024    .3 (H) 08/03/2024     08/03/2024     Lab Results   Component Value Date    NEUTROABS 3.91 08/02/2024         Lab Results   Component Value Date     08/03/2024    K 4.7 08/03/2024     08/03/2024    CO2 25.0 08/03/2024    BUN 27 (H) 08/03/2024    CREATININE 2.46 (H) 08/03/2024    GLUCOSE 86 08/03/2024    CALCIUM 9.3 08/03/2024    BILITOT 0.4 08/02/2024    ALKPHOS 132 (H) 08/02/2024    AST 42 (H) 08/02/2024    ALT 26 08/02/2024    AGRATIO 1.1 08/02/2024    GLOB 3.2 08/02/2024       Lab Results   Component Value Date    INR 1.04 06/29/2024    INR 1.29 (H) 01/05/2023    INR 1.18 (H) 08/25/2021    PROTIME 14.1 06/29/2024    PROTIME 16.2 (H) 01/05/2023    PROTIME 14.1 (H) 08/25/2021       RADIOLOGY STUDIES REPORT/REVIEWED AND INTERPRETED BY ME:  XR Chest 1 View    Result Date: " 8/2/2024  EXAMINATION:  XR CHEST 1 VW-  8/2/2024 3:16 PM  HISTORY: Shortness of air. Lung cancer.  COMPARISON: 6/29/2024.  TECHNIQUE: Single view AP image.  FINDINGS: A mass in the upper to midlung zone on the right appears increased in size measuring approximately 4.8 cm. Fibrotic lung changes appear relatively stable. Pleural thickening/effusion on the right appears relatively stable. There is cardiomegaly. The thoracic aorta is atheromatous.       1. A mass in the upper to midlung zone on the right is larger compared to the prior study measuring in the range of about 4.8 cm. 2. Relatively stable pulmonary fibrosis. Pleural thickening/effusion on the right also appears relatively stable. 3. Cardiomegaly.   The full report of this exam was immediately signed and available to the emergency room. The patient is currently in the emergency room.    This report was signed and finalized on 8/2/2024 4:23 PM by Dr. Ramos Lagos MD.         My interpretation:significant B/L lung opacification      ASSESSMENT:  #Chronic respiratory failure-worsening  Differential diagnosis to include worsening malignancy versus infectious process versus others.  Recommended CT chest without contrast for better assessment of pulmonary process.  Consider pulmonary consultation    # Lung cancer-patient treatment is on hold.  Most recent CT chest abdomen pelvis showed overall stability of disease.  Plan was to repeat CT chest now to assess disease stability versus progression.  Plan has been discussed with Dr. Danny Cool.  Patient recently received SBRT.  Keytruda is on hold.      CT scan of the chest without contrast at [facility] on 6/29/2024 was compared to 5/6/2024 reported the following  1. A spiculated mass in the right upper lobe laterally is larger on the   current study.   2. There is consolidation around a previously described nodule in the   right lung apex likely related to posttreatment change.   3. Diffuse reticulonodular fibrotic  "changes throughout both lungs.   Emphysema.   4. Pleural thickening on the right that is more focal in the right lung   base laterally. Metastatic pleural disease is considered.   5. Ectasia of the ascending thoracic aorta measuring 4.3 cm. Main   pulmonary artery segment is dilated measuring 3.6 cm. Mild cardiomegaly. ...      CT scan of the abdomen pelvis at [facility] on 2024 was compared to 2023 and reported the followin. There is a mildly dilated proximal small bowel loop measuring 4 cm.  No other small bowel distention is seen. This could be transient or due  to partial obstruction. Gastric wall thickening likely related to under  distention. There is under distention of portions of the colon and  moderate stool in the colon.  2. 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  Fibrosis in the lung bases with emphysema.  3. Left renal cyst. A 6 mm nonobstructive renal stone lower pole on the  left.  4. Mildly enlarged prostate measuring 4.7 cm.  5. Air within the biliary tree likely related to prior sphincterotomy.  Prior cholecystectomy.  6. Coarsening of the trabecula in the right femoral neck and  trochanteric region could be related to early Paget's disease. There is  no cortical thickening. A lipo sclerosing myxofibrous tumor is felt to  be less likely as there is no sclerotic margin.    Dr Michele discussed with dr Cool scans findings  Dr. Cool was gracious enough to review imaging studies with the following assessment by Dr. Danny Cool:  \"I have reviewed the CT scan of the thorax on this admission for atrial fibrillation with rapid ventricular response.     He completed recent SBRT radiotherapy for 2 right lung nodules on 2024.     Review of the current CT scan appears consistent with postradiation change.  We cannot entirely rule out progression however this appears unlikely with the chronology and radiographic appearance.     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  Tolerating    # Normocytic anemia  Continue to monitor.  Hemoglobin stable.    #Bilateral leg edema  Ultrasound bilateral lower EXTR: Pending    PLAN  CT chest without contrast  Continue to monitor cytopenias  Continue current supportive care  Palliative care consultation  Consider pulmonary consultation      08/03/24  10:18 CDT  "

## 2024-08-03 NOTE — PROGRESS NOTES
Patient Name: Karoline Prater  Date of Admission: 8/2/2024  Today's Date: 08/03/24  Length of Stay: 0  Primary Care Physician: Irving Alexis MD    Subjective   Chief Complaint: Increasing shortness of breath  Shortness of Breath  Associated symptoms include leg swelling and wheezing. Pertinent negatives include no abdominal pain, chest pain or rhinorrhea.   Leg Swelling  Associated symptoms include coughing, fatigue and weakness. Pertinent negatives include no abdominal pain, chest pain, congestion or nausea.      HPI:  Vianey Salcido 84-year-old white male with past medical history of GERD, blindness of his left eye, hypertension, BPH, pancreatic cancer with metastasis to right lung, CHF, last EF was 61-65% with left ventricular diastolic dysfunction and mild pulmonary hypertension on 7/3/2024, CKD stage III, and fibrotic lung disease.  Patient presented to [facility] emergency department on 8/2/2024 with complaints of shortness of breath requiring continuous use of his as needed oxygenation with associated leg swelling.  Patient states his shortness of breath is worse with exertion, denies any chest pain, nausea, vomiting, loss of consciousness, fever or change in his urinary or bowel habits.  On admission patient's blood pressure was soft, other vital signs were stable.  Patient received IV Lasix 20 mg and states he is passing urine.  ED lab results troponin x 2 mildly elevated, potassium of 5.4, , proBNP of 295.5, BUN of 30, creatinine of 2.44, CBC unremarkable, respiratory panel negative and chest x-ray mass in the upper midlung zone on the right larger compared to prior study measuring the range of about 4.8 cm, stable pulmonary fibrosis with cardiomegaly.  Patient was admitted for continued observation and treatment    Today: Patient is resting comfortably in bed on 2.5 L of oxygen with oxygen saturation 92%.  No family is at bedside.  Is alert and oriented and able to participate in  assessment and history.  Patient is aware of the chest x-ray results and the increasing size of his lung mass.  He states that his shortness of breath is significantly improved at rest but when he has gotten up to use the bathroom he still has extensive exertional dyspnea.  Additionally patient has auscultated bilateral wheezes, initiated one-time dose of Solu-Medrol 40 mg IV.  Patient's bilateral lower extremities are significantly improved, 1+ edema remains.  Mild erythema to the left leg will continue to monitor closely given patient's recent history of DVT.  Additionally performed venous Doppler which was negative.  Patient was given Lasix 20 mg IV x 1, transition to 2 one-time dose of Bumex and Lasix p.o. changed to 0.5 mg of Bumex in the a.m.  Oncology consultation per Dr. Rico with recommendations for CT scan of the chest and pulmonary consultation for worsening lung cancer and need for coordinated care.  Dr. Rico also stated he discussed the case and reviewed the films with Dr. Cool, radiation oncology who will see the patient in September to review need for reresumption of radiation treatment.  Patient was made aware of discussion and plan of care.  All questions were answered to the best of my ability and patient is in agreement to continue with oncology plan of care and pulmonary consultation.      TREATMENT SUMMARY  Keytruda, carboplatin, Alimta initiated 4/18/2019 to be given every 3 weeks for 4 cycles - 4th cycle 7/5/2019, then Keytruda + Alimta as maintenance to continue indefinitely until progression or intolerable side effects   Fahad received cycle #25 of Keytruda/Alimta on 10/29/2020. He was then placed on an indefinite chemotherapy holiday on 12/30/2020  SBRT by Dr. Danny Cool was delivered in 5 treatment fractions for a total dose of 5000 cGy. XRT was initiated 1/24/2024 and was completed on 2/7/2024 08/02/24 2000 08/03/24  0428   Weight: 77.7 kg (171 lb 6.4 oz) 77.1 kg  (170 lb)        Review of Systems   Constitutional:  Positive for fatigue.   HENT:  Negative for congestion, rhinorrhea and trouble swallowing.    Eyes: Negative.    Respiratory:  Positive for cough, shortness of breath and wheezing.    Cardiovascular:  Positive for leg swelling. Negative for chest pain.   Gastrointestinal:  Negative for abdominal distention, abdominal pain, diarrhea and nausea.   Genitourinary:  Negative for difficulty urinating and flank pain.   Musculoskeletal: Negative.    Skin:  Positive for pallor.   Allergic/Immunologic: Negative.    Neurological:  Positive for weakness. Negative for dizziness and syncope.   Hematological: Negative.    Psychiatric/Behavioral: Negative.        All pertinent negatives and positives are as above. All other systems have been reviewed and are negative unless otherwise stated.     Objective    Temp:  [97.5 °F (36.4 °C)-98.1 °F (36.7 °C)] 97.5 °F (36.4 °C)  Heart Rate:  [73-91] 85  Resp:  [16-20] 16  BP: ()/(58-78) 102/72  Physical Exam  Vitals and nursing note reviewed.   Constitutional:       Appearance: He is ill-appearing.      Comments: Patient is resting comfortably in bed on 2.5 L with oxygen saturation at 92%.  No family at bedside   HENT:      Head: Normocephalic.      Right Ear: Tympanic membrane normal.      Left Ear: Tympanic membrane normal.      Nose: Nose normal.      Mouth/Throat:      Mouth: Mucous membranes are moist.      Pharynx: Oropharynx is clear.   Eyes:      Pupils: Pupils are equal, round, and reactive to light.   Cardiovascular:      Rate and Rhythm: Normal rate and regular rhythm.      Pulses: Normal pulses.      Heart sounds: Normal heart sounds.   Pulmonary:      Effort: Accessory muscle usage present. No tachypnea or respiratory distress.      Breath sounds: Examination of the right-upper field reveals wheezing. Examination of the left-upper field reveals wheezing. Examination of the right-lower field reveals decreased breath  "sounds. Examination of the left-lower field reveals decreased breath sounds. No wheezing.      Comments: Significant exertional dyspnea  Chest:      Chest wall: No tenderness.   Abdominal:      General: Abdomen is flat.      Palpations: Abdomen is soft.   Genitourinary:     Comments: Voiding per urinal  Musculoskeletal:      Cervical back: Normal range of motion.      Right lower leg: No edema.      Left lower leg: Edema present.      Comments: Generalized weakness   Skin:     General: Skin is warm.      Capillary Refill: Capillary refill takes less than 2 seconds.      Coloration: Skin is pale.   Neurological:      General: No focal deficit present.      Mental Status: He is alert and oriented to person, place, and time.   Psychiatric:         Mood and Affect: Mood normal.         Behavior: Behavior normal.         Thought Content: Thought content normal.         Judgment: Judgment normal.         Results Review:  I have reviewed the labs, radiology results, and diagnostic studies.    Laboratory Data:   Results from last 7 days   Lab Units 08/03/24  0335 08/02/24  1604   WBC 10*3/mm3 5.22 5.71   HEMOGLOBIN g/dL 10.6* 11.0*   HEMATOCRIT % 35.4* 35.5*   PLATELETS 10*3/mm3 204 203        Results from last 7 days   Lab Units 08/03/24  0335 08/02/24  1604   SODIUM mmol/L 139 139   POTASSIUM mmol/L 4.7 5.4*   CHLORIDE mmol/L 107 105   CO2 mmol/L 25.0 23.0   BUN mg/dL 27* 30*   CREATININE mg/dL 2.46* 2.44*   CALCIUM mg/dL 9.3 9.6   BILIRUBIN mg/dL  --  0.4   ALK PHOS U/L  --  132*   ALT (SGPT) U/L  --  26   AST (SGOT) U/L  --  42*   GLUCOSE mg/dL 86 126*       Culture Data:   No results found for: \"BLOODCX\", \"URINECX\", \"WOUNDCX\", \"MRSACX\", \"RESPCX\", \"STOOLCX\"    Microbiology Results (last 10 days)       Procedure Component Value - Date/Time    Respiratory Panel PCR w/COVID-19(SARS-CoV-2) JORDYN/MATILDE/BRYAN/PAD/COR/ASHER In-House, NP Swab in UTM/VTM, 2 HR TAT - Swab, Nasopharynx [[id number]]  (Normal) Collected: 08/02/24 1610    Lab " Status: Final result Specimen: Swab from Nasopharynx Updated: 08/02/24 1711     ADENOVIRUS, PCR Not Detected     Coronavirus 229E Not Detected     Coronavirus HKU1 Not Detected     Coronavirus NL63 Not Detected     Coronavirus OC43 Not Detected     COVID19 Not Detected     Human Metapneumovirus Not Detected     Human Rhinovirus/Enterovirus Not Detected     Influenza A PCR Not Detected     Influenza B PCR Not Detected     Parainfluenza Virus 1 Not Detected     Parainfluenza Virus 2 Not Detected     Parainfluenza Virus 3 Not Detected     Parainfluenza Virus 4 Not Detected     RSV, PCR Not Detected     Bordetella pertussis pcr Not Detected     Bordetella parapertussis PCR Not Detected     Chlamydophila pneumoniae PCR Not Detected     Mycoplasma pneumo by PCR Not Detected    Narrative:      In the setting of a positive respiratory panel with a viral infection PLUS a negative procalcitonin without other underlying concern for bacterial infection, consider observing off antibiotics or discontinuation of antibiotics and continue supportive care. If the respiratory panel is positive for atypical bacterial infection (Bordetella pertussis, Chlamydophila pneumoniae, or Mycoplasma pneumoniae), consider antibiotic de-escalation to target atypical bacterial infection.             Radiology Data:   Imaging Results (Last 24 Hours)       Procedure Component Value Units Date/Time    US Venous Doppler Lower Extremity Bilateral (duplex) [[id number]] Resulted: 08/03/24 1211     Updated: 08/03/24 1231    CT Chest Without Contrast Diagnostic [[id number]] Resulted: 08/03/24 1208     Updated: 08/03/24 1215    XR Chest 1 View [[id number]] Collected: 08/02/24 1618     Updated: 08/02/24 1626    Narrative:      EXAMINATION:  XR CHEST 1 VW-  8/2/2024 3:16 PM     HISTORY: Shortness of air. Lung cancer.     COMPARISON: 6/29/2024.     TECHNIQUE: Single view AP image.     FINDINGS: A mass in the upper to midlung zone on the right appears  increased in  size measuring approximately 4.8 cm. Fibrotic lung changes  appear relatively stable. Pleural thickening/effusion on the right  appears relatively stable. There is cardiomegaly. The thoracic aorta is  atheromatous.          Impression:      1. A mass in the upper to midlung zone on the right is larger compared  to the prior study measuring in the range of about 4.8 cm.  2. Relatively stable pulmonary fibrosis. Pleural thickening/effusion on  the right also appears relatively stable.  3. Cardiomegaly.        The full report of this exam was immediately signed and available to the  emergency room. The patient is currently in the emergency room.           This report was signed and finalized on 8/2/2024 4:23 PM by Dr. Ramos Lagos MD.               I have reviewed the patient's current medications.     albuterol, 1.25 mg, Nebulization, 4x Daily - RT   And  ipratropium, 0.5 mg, Nebulization, 4x Daily - RT  aspirin, 81 mg, Oral, Daily  atorvastatin, 20 mg, Oral, Nightly  [START ON 8/4/2024] bumetanide, 0.5 mg, Oral, Daily  dronedarone, 400 mg, Oral, Q12H  heparin (porcine), 5,000 Units, Subcutaneous, Q8H  isosorbide mononitrate, 30 mg, Oral, Q24H  melatonin, 10 mg, Oral, Nightly  metoprolol succinate XL, 50 mg, Oral, Daily  montelukast, 10 mg, Oral, Nightly  multivitamin with minerals, 1 tablet, Oral, Daily  pantoprazole, 40 mg, Oral, Daily  sodium bicarbonate, 650 mg, Oral, TID  sodium chloride, 10 mL, Intravenous, Q12H         Intake/Output Summary (Last 24 hours) at 8/3/2024 1416  Last data filed at 8/3/2024 1147  Gross per 24 hour   Intake 660 ml   Output 3000 ml   Net -2340 ml        Assessment/Plan   Assessment  Active Hospital Problems    Diagnosis     **Acute on chronic respiratory failure with hypoxia     Metastatic adenocarcinoma of the RUL     Chronic diastolic congestive heart failure     Bilateral lower extremity edema     Hyperkalemia     Stage 3 chronic kidney disease        Treatment Plan    1. Acute  on chronic respiratory failure with hypoxia-upon admission to the emergency department patient presented with his home 2 L of oxygen that he normally wears as needed.  Patient's oxygen saturation on room air was reported as 86%.  Currently patient has no complaints of shortness of breath at rest however continued dyspnea on exertion.  Patient continues to have saturations within normal limits on his as needed dose of 2.5 L.  This morning patient presented with bilateral wheezes, initiated one-time dose of Solu-Medrol 40 mg.  Continue nebulizers Singulair, as needed ABGs incentive spirometry, supplemental oxygen with notification for provider for any sustained usage greater than 4 L.  Dr. Rico's recommendation pulmonology has been consulted to evaluate plan of care given patient's worsening adenosarcoma.    2.  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  Chest x-ray performed on admission revealed a known mass followed by Dr. Michele and Dr. Cool to be increasing in size in the mid lung zone of on the right.  Patient received Keytruda, chemotherapy, and radiation which concluded with radiation in February 2024.  Due to new findings oncology consultation per Dr. Rico discussed the case with Dr. Cool with recommendations for CT scan of the chest, pulmonary consultation to assist in treatment, palliative care consult, and continued monitoring of cytopenias.    2.  Bilateral lower extremity edema-patient has a history of chronic lower extremity edema on daily Lasix.  Patient initially presented with shortness of breath and 2+ pitting edema.  Today edema is significantly improved, 1+ edema bilaterally, with mild erythema.  Discontinued Lasix IV and initiated p.o. Bumex in the a.m.  Given patient's history of cellulitis continued close monitoring of erythema.    4.  Stage III chronic kidney  disease-patient's initial presentation was BUN of 27 and creatinine of 2.46 with a GFR of 25.5.  Previous discharge creatinine at 2.34.  Continue to review nephrotoxic agents and monitor with daily BMP.    5.  Hyperkalemia-chronic history of hyperkalemia, presented with potassium of 5.4 given Lokelma x 1.  Today 4.7, continue to monitor with daily BMP    6.  Chronic diastolic heart failure with preserved ejection fraction-last echocardiogram 7/3/2024 revealed ejection fraction of 60-65%.  BNP on admission was 295.  Patient has chronic complaints of bilateral lower extremity edema and shortness of breath.  Patient it is optimized on metoprolol, isosorbide, and losartan.  Continue strict intake and output, daily weights, heart failure education.    Due to patient's recent history of DVT, venous Doppler was performed without evidence of thrombus.  VTE prophylaxis with SCDs  Labs in a.m.    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     [facility] Data  External documents reviewed: Epic review of previous oncology notes  Cardiac tracing (EKG, telemetry) interpretation: None  Radiology interpretation: 8/2/2024 chest x-ray per radiology reviewed, 8/3/2024 venous Doppler of bilateral extremities and CT of the chest radiology reviewed  Labs reviewed:   8/2/2024 at bedtime troponin, CMP, lactate, CBC with differential, respiratory PCR reviewed  8/3/2024 D-dimer, CBC and BMP reviewed, repeat in a.m.  Any tests that  were considered but not ordered: CTA of the chest, due to patient's CKD will leave it up to oncology     Decision rules/scores evaluated (example [id number], Wells, etc): [id number] score of 5     Discussed with: Dr. Lloyd and patient     Care Planning  Shared decision making: Dr. Lloyd and patient  Code status and discussions: Full code per patient  Surrogate Decision Maker is son Beau or brother Keshav    Disposition  Social Determinants of Health that impact treatment or disposition: Lives at home alone and may require assistance with progressing lung cancer.  Palliative care and social work consult initiated  I expect the patient to be discharged to home with home health in 1-2 days.     Electronically signed by MARITZA Johnson, 08/03/24, 14:16 CDT.

## 2024-08-03 NOTE — CONSULTS
[facility] [place] PULMONARY CONSULT - [facility]    24, 15:07 CDT  Patient Care Team:  Irving Alexis MD as PCP - General (General Practice)  Gareth Becerra MD as Consulting Physician (Pulmonary Disease)  Randy Somers DO as Consulting Physician (Gastroenterology)  Jos Sylvester MD as Consulting Physician (Urology)  Jeff Michele MD as Consulting Physician (Oncology)  Name: Karoline Prater  : 1940  MRN: [id number]  Contact Serial Number [id number]    Chief complaint: Shortness of breath, acute on chronic hypoxic respiratory failure on oxygen, history of adenocarcinoma lung with metastasis, COPD, pulmonary fibrosis, on supplemental oxygen at home, currently getting radiation treatment    HPI:  We have been consulted by Zachary Lloyd MD to see this 84 y.o. male.  Patient is a very pleasant elderly  gentleman was seen in the pulmonary consult in medical floor.    Patient was seen by Dr. Becerra in the [facility] in 2019  No further follow-up was noted in the epic.  He had a right upper lobe lung mass and also getting treatment under Dr. Michele for the last few years and currently getting radiation treatment with Dr. Danny Cool.  He presented to the hospital with increasing shortness of breath and was admitted under the hospitalist service.  He was seen by Dr. Rico today.  Patient is also getting monitored for neutropenia and thrombocytopenia.  He is also known to have chronic kidney disease with elevated BUN and creatinine.  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  The patient had been treated with immunotherapy and was also treated with Keytruda.  He received and completed cycles of radiation treatment with radiation oncology and completed in February.  He is not on any active chemotherapy at this time and there is a port in place.  He gets regular follow-up with oncology and  radiation oncology.  He was seen and followed by Dr. Michele in [facility] for his lung cancer.    Patient reported he is a former smoker and quit smoking many years ago.  He used to chew tobacco as well.  He was seen by Dr. Becerra during hospital admissions in 2019 but no further follow-up is noted and patient was using only albuterol inhaler at home and sometimes used nebulizer.  The patient's current medications included DuoNeb and was also getting Singulair but was not on any other medications.  He was not on any medications for pulmonary fibrosis and the CT scan did not show the typical UIP pattern.    The patient reported having shortness of breath but he is normally on 2.5 L of oxygen for the last few years and he is on the same amount of oxygen during his hospitalizations.  He is vaccinated for all adult vaccinations and did not have any COVID infection.  He however reported having pneumonia in the past.  He has some allergy symptoms with sinus drainage and cough.  Hospitalist requested nurse to follow the patient for managing the pulmonary issues.  Patient also being followed by oncology and radiation oncology and may need a nephrology consult due to underlying chronic kidney disease.  Addition to the above problem patient also has history of chronic congestive diastolic heart failure with a preserved ejection fraction.  He also has known pancreatic mass and MGUS.  He has hypertension and hyperlipidemia and anemia requiring blood transfusion.      Past Medical History:   has a past medical history of Arthritis, Atrial fibrillation with rapid ventricular response (05/31/2019), Blindness of left eye, BPH with obstruction/lower urinary tract symptoms, Cancer, CHF (congestive heart failure), CKD (chronic kidney disease) stage 3, GFR 30-59 ml/min, COPD with acute exacerbation (8/3/2024), Coronary artery disease, Elevated cholesterol, Essential hypertension (10/02/2017), Fibrotic lung diseases, GERD  (gastroesophageal reflux disease), Hearing loss, History of transfusion, Hydronephrosis of left kidney, Hyperlipidemia, Hypertension, Lung cancer, Mass of duodenum versus letty hepatis  (04/27/2019), Mass of left renal hilum  (04/27/2019), Mass of upper lobe of right lung (02/2019), Mediastinal adenopathy (10/24/2018), Monoclonal gammopathy of unknown significance (MGUS) (09/11/2018), Pancreatic mass, and Shortness of breath.   has a past surgical history that includes Cholecystectomy; Bronchoscopy (N/A, 10/24/2018); Esophagogastroduodenoscopy (N/A, 12/11/2018); Colonoscopy (N/A, 1/3/2019); Foot surgery (Right, 1966); Eye surgery (Left, 1964); cystoscopy, retrograde pyelogram and stent insertion (Left, 3/8/2019); Esophagogastroduodenoscopy (N/A, 4/29/2019); Abdominal surgery; Fracture surgery; and Esophagogastroduodenoscopy (N/A, 5/9/2019).  Allergies   Allergen Reactions    Penicillins Hives     Medications:  albuterol, 1.25 mg, Nebulization, 4x Daily - RT   And  ipratropium, 0.5 mg, Nebulization, 4x Daily - RT  aspirin, 81 mg, Oral, Daily  atorvastatin, 20 mg, Oral, Nightly  budesonide-formoterol, 2 puff, Inhalation, BID - RT  [START ON 8/4/2024] bumetanide, 0.5 mg, Oral, Daily  cetirizine, 10 mg, Oral, Daily  dronedarone, 400 mg, Oral, Q12H  fluticasone, 2 spray, Each Nare, Daily  heparin (porcine), 5,000 Units, Subcutaneous, Q8H  isosorbide mononitrate, 30 mg, Oral, Q24H  melatonin, 10 mg, Oral, Nightly  metoprolol succinate XL, 50 mg, Oral, Daily  montelukast, 10 mg, Oral, Nightly  multivitamin with minerals, 1 tablet, Oral, Daily  pantoprazole, 40 mg, Oral, Daily  sodium bicarbonate, 650 mg, Oral, TID  sodium chloride, 10 mL, Intravenous, Q12H         Family History:  Family History   Problem Relation Age of Onset    Hypertension Mother     Leukemia Father      Social History:   reports that he quit smoking about 20 years ago. His smoking use included cigarettes. He started smoking about 69 years ago. He quit  smokeless tobacco use about 54 years ago.  His smokeless tobacco use included chew. He reports that he does not drink alcohol and does not use drugs.  Review of Systems:  Review of Systems   Constitutional:  Positive for fatigue. Negative for fever.   HENT:  Positive for congestion, postnasal drip and sinus pressure.    Eyes: Negative.    Respiratory:  Positive for cough, chest tightness and shortness of breath.    Cardiovascular: Negative.    Gastrointestinal: Negative.    Endocrine: Negative.    Genitourinary: Negative.    Musculoskeletal:  Positive for arthralgias and back pain.   Allergic/Immunologic: Positive for environmental allergies.   Neurological:  Positive for weakness.        Hearing impairment   Hematological: Negative.    Psychiatric/Behavioral:  The patient is nervous/anxious.       Physical Exam:  Temp:  [97.5 °F (36.4 °C)-98.1 °F (36.7 °C)] 97.5 °F (36.4 °C)  Heart Rate:  [73-91] 83  Resp:  [16-20] 18  BP: ()/(58-78) 102/72  Intake/Output Summary (Last 24 hours) at 8/3/2024 1507  Last data filed at 8/3/2024 1147  Gross per 24 hour   Intake 660 ml   Output 3000 ml   Net -2340 ml         08/02/24  1548 08/02/24  2000 08/03/24  0428   Weight: 77.6 kg (171 lb) 77.7 kg (171 lb 6.4 oz) 77.1 kg (170 lb)     SpO2 Percentage    08/03/24 1147 08/03/24 1434 08/03/24 1443   SpO2: 91% 92% 94%     Body mass index is 29.17 kg/m².   Physical Exam  Vitals and nursing note reviewed.   Constitutional:       General: He is not in acute distress.     Appearance: He is ill-appearing.      Comments: Elderly  male looking stated age.  Resting comfortably.   HENT:      Head: Normocephalic and atraumatic.      Right Ear: Tympanic membrane and external ear normal.      Left Ear: Tympanic membrane and external ear normal.      Nose: Nose normal. No congestion or rhinorrhea.      Mouth/Throat:      Mouth: Mucous membranes are moist.      Pharynx: No oropharyngeal exudate or posterior oropharyngeal erythema.    Eyes:      General: No scleral icterus.        Right eye: No discharge.         Left eye: No discharge.      Extraocular Movements: Extraocular movements intact.      Conjunctiva/sclera: Conjunctivae normal.      Pupils: Pupils are equal, round, and reactive to light.   Neck:      Vascular: No carotid bruit.   Cardiovascular:      Rate and Rhythm: Normal rate and regular rhythm.      Pulses: Normal pulses.      Heart sounds: Normal heart sounds. No murmur heard.     No friction rub. No gallop.   Pulmonary:      Effort: Respiratory distress present.      Breath sounds: No stridor. Wheezing present. No rhonchi or rales.   Abdominal:      General: Abdomen is flat. Bowel sounds are normal. There is no distension.      Palpations: There is no mass.      Tenderness: There is no abdominal tenderness. There is no guarding or rebound.      Hernia: No hernia is present.   Genitourinary:     Comments: Not examined  Musculoskeletal:         General: Signs of injury present. No swelling, tenderness or deformity. Normal range of motion.      Cervical back: Normal range of motion and neck supple. No rigidity or tenderness.      Right lower leg: Edema present.      Left lower leg: Edema present.   Lymphadenopathy:      Cervical: No cervical adenopathy.   Skin:     General: Skin is warm.      Capillary Refill: Capillary refill takes less than 2 seconds.      Coloration: Skin is not jaundiced or pale.      Findings: No bruising or erythema.   Neurological:      General: No focal deficit present.      Mental Status: He is alert and oriented to person, place, and time. Mental status is at baseline.      Cranial Nerves: No cranial nerve deficit.      Sensory: No sensory deficit.      Motor: Weakness present.      Deep Tendon Reflexes: Reflexes normal.      Comments: Very hard of hearing   Psychiatric:         Mood and Affect: Mood normal.         Behavior: Behavior normal.         Thought Content: Thought content normal.       Result  Review  Results from last 7 days   Lab Units 08/03/24  0335 08/02/24  1604   WBC 10*3/mm3 5.22 5.71   HEMOGLOBIN g/dL 10.6* 11.0*   PLATELETS 10*3/mm3 204 203     Results from last 7 days   Lab Units 08/03/24  0335 08/02/24  1604   SODIUM mmol/L 139 139   POTASSIUM mmol/L 4.7 5.4*   CO2 mmol/L 25.0 23.0   BUN mg/dL 27* 30*   CREATININE mg/dL 2.46* 2.44*   MAGNESIUM mg/dL 1.7  --    GLUCOSE mg/dL 86 126*         Microbiology Results (last 10 days)       Procedure Component Value - Date/Time    Respiratory Panel PCR w/COVID-19(SARS-CoV-2) JORDYN/MATILDE/BRYAN/PAD/COR/ASHER In-House, NP Swab in UTM/VTM, 2 HR TAT - Swab, Nasopharynx [[id number]]  (Normal) Collected: 08/02/24 1610    Lab Status: Final result Specimen: Swab from Nasopharynx Updated: 08/02/24 1711     ADENOVIRUS, PCR Not Detected     Coronavirus 229E Not Detected     Coronavirus HKU1 Not Detected     Coronavirus NL63 Not Detected     Coronavirus OC43 Not Detected     COVID19 Not Detected     Human Metapneumovirus Not Detected     Human Rhinovirus/Enterovirus Not Detected     Influenza A PCR Not Detected     Influenza B PCR Not Detected     Parainfluenza Virus 1 Not Detected     Parainfluenza Virus 2 Not Detected     Parainfluenza Virus 3 Not Detected     Parainfluenza Virus 4 Not Detected     RSV, PCR Not Detected     Bordetella pertussis pcr Not Detected     Bordetella parapertussis PCR Not Detected     Chlamydophila pneumoniae PCR Not Detected     Mycoplasma pneumo by PCR Not Detected    Narrative:      In the setting of a positive respiratory panel with a viral infection PLUS a negative procalcitonin without other underlying concern for bacterial infection, consider observing off antibiotics or discontinuation of antibiotics and continue supportive care. If the respiratory panel is positive for atypical bacterial infection (Bordetella pertussis, Chlamydophila pneumoniae, or Mycoplasma pneumoniae), consider antibiotic de-escalation to target atypical bacterial  infection.          Recent radiology:   Imaging Results (Last 72 Hours)       Procedure Component Value Units Date/Time    US Venous Doppler Lower Extremity Bilateral (duplex) [[id number]] Resulted: 08/03/24 1211     Updated: 08/03/24 1231    CT Chest Without Contrast Diagnostic [[id number]] Resulted: 08/03/24 1208     Updated: 08/03/24 1215    XR Chest 1 View [[id number]] Collected: 08/02/24 1618     Updated: 08/02/24 1626    Narrative:      EXAMINATION:  XR CHEST 1 VW-  8/2/2024 3:16 PM     HISTORY: Shortness of air. Lung cancer.     COMPARISON: 6/29/2024.     TECHNIQUE: Single view AP image.     FINDINGS: A mass in the upper to midlung zone on the right appears  increased in size measuring approximately 4.8 cm. Fibrotic lung changes  appear relatively stable. Pleural thickening/effusion on the right  appears relatively stable. There is cardiomegaly. The thoracic aorta is  atheromatous.          Impression:      1. A mass in the upper to midlung zone on the right is larger compared  to the prior study measuring in the range of about 4.8 cm.  2. Relatively stable pulmonary fibrosis. Pleural thickening/effusion on  the right also appears relatively stable.  3. Cardiomegaly.        The full report of this exam was immediately signed and available to the  emergency room. The patient is currently in the emergency room.           This report was signed and finalized on 8/2/2024 4:23 PM by Dr. Ramos Lagos MD.             Personal review of imaging : CT scan shows right upper lobe lung mass and extensive fibrotic changes noted.  Pulmonary fibrosis and cardiomegaly also noted  Other test results (not lab or imaging): Results for orders placed during the hospital encounter of 06/29/24    Adult Stress Echo W/ Cont or Stress Agent if Necessary Per Protocol    Interpretation Summary    Overall, this is an intermediate risk test for ischemia.    No ECG evidence of myocardial ischemia. Negative clinical evidence of myocardial  ischemia. Findings consistent with a normal ECG stress test.    Abnormal stress echo consistent with an intermediate risk study for myocardial ischemia.    Left ventricular systolic function is normal. Left ventricular ejection fraction appears to be 61 - 65%.    The following left ventricular wall segments are hypokinetic: apical septal and apex hypokinetic.  Reviewed.  Independent review of ekg: Reviewed  Problem List as identified by Epic (may contain historical, inactive problems)    Acute on chronic respiratory failure with hypoxia    Malignant neoplasm of upper lobe of right lung    Stage 3 chronic kidney disease    Pulmonary fibrosis    Hyperkalemia    Former smoker    Bilateral lower extremity edema    Metastatic adenocarcinoma of the RUL    Chronic diastolic congestive heart failure    COPD with acute exacerbation    S/P radiation > 12 weeks    Pulmonary Assessment:    Acute on chronic hypoxic respiratory failure  Acute exacerbation of chronic obstructive pulmonary disease  Pulmonary fibrosis less likely UIP   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  History of tobacco use  Chronic respiratory failure and hypoxia on home oxygen  Hypertension  Hyperlipidemia  Chronic congestive diastolic heart failure with preserved ejection fraction  Atrial fibrillation  Thrombocytopenia and neutropenia/anemia requiring blood transfusion  Severe hearing impairment  Chronic kidney disease stage III  Allergic rhinitis    Recommend/plan:   Patient has shortness of breath at the time of presentation and he has known history of COPD and pulmonary fibrosis but no pulmonary follow-up was done in the last few years after his last inpatient visit with Dr. Becerra 5 years ago  He was using only using albuterol rescue inhaler at home and was not on any long-term inhaler no recent PFT was available.  I have reviewed the recent  imaging studies.  Patient with extensive pulmonary fibrosis COPD   His pulmonary fibrosis does not appear to be UIP but more diffuse and it could be to some extent contributed by radiation treatment for the lung cancer.  Patient already been treated with the chemotherapy and received Keytruda and completed radiation treatment and followed by oncology and radiation oncology.  His current presentation is more consistent with a COPD exacerbation.  CT scan of the chest showed mass on the right side and fibrotic changes but no definite consolidation was noted.  His white cell count is normal platelets are normal.  No antibiotics started  I started the patient on Symbicort and DuoNeb and also will start the patient on Solu-Medrol  Continue oxygen.  Currently on 2.5 L that is his baseline.  Use Vapotherm or BiPAP if needed  Plan for outpatient pulmonary follow-up with a PFT and possible sleep study when more stable.  Added fluticasone and Zyrtec for nasal allergy and he is already on Singulair.  There is no immediate plan for starting back on Keytruda radiation treatment.  Oncology following.    Monitor renal function carefully.  He may be at his baseline but would benefit from nephrology consult  Continue DVT and stress ulcer prophylaxis.  On heparin on Protonix.  Continue support.  PT OT.  He needs to address his CODE STATUS because oncologist suggested palliative care  Patient have repeat labs imaging studies from time to time.  CODE STATUS: Full.  Overall prognosis: Guarded.  We appreciate the consult and we will follow  Total time spent in seeing this patient  as a pulmonary consult was 45 minutes    Thank you for this consult.  We will follow along.    Electronically signed by     Tarik Crocker MD,  Pulmonologist/Intensivist   08/03/24, 2:19 PM CDT.

## 2024-08-04 LAB
ANION GAP SERPL CALCULATED.3IONS-SCNC: 11 MMOL/L (ref 5–15)
BUN SERPL-MCNC: 33 MG/DL (ref 8–23)
BUN/CREAT SERPL: 13.5 (ref 7–25)
CALCIUM SPEC-SCNC: 10.2 MG/DL (ref 8.6–10.5)
CHLORIDE SERPL-SCNC: 104 MMOL/L (ref 98–107)
CO2 SERPL-SCNC: 24 MMOL/L (ref 22–29)
CREAT SERPL-MCNC: 2.44 MG/DL (ref 0.76–1.27)
DEPRECATED RDW RBC AUTO: 59.1 FL (ref 37–54)
EGFRCR SERPLBLD CKD-EPI 2021: 25.8 ML/MIN/1.73
ERYTHROCYTE [DISTWIDTH] IN BLOOD BY AUTOMATED COUNT: 16.4 % (ref 12.3–15.4)
GLUCOSE SERPL-MCNC: 170 MG/DL (ref 65–99)
HCT VFR BLD AUTO: 37.3 % (ref 37.5–51)
HGB BLD-MCNC: 11.6 G/DL (ref 13–17.7)
MAGNESIUM SERPL-MCNC: 2.2 MG/DL (ref 1.6–2.4)
MCH RBC QN AUTO: 30.7 PG (ref 26.6–33)
MCHC RBC AUTO-ENTMCNC: 31.1 G/DL (ref 31.5–35.7)
MCV RBC AUTO: 98.7 FL (ref 79–97)
PLATELET # BLD AUTO: 218 10*3/MM3 (ref 140–450)
PMV BLD AUTO: 11.4 FL (ref 6–12)
POTASSIUM SERPL-SCNC: 5 MMOL/L (ref 3.5–5.2)
POTASSIUM SERPL-SCNC: 5.3 MMOL/L (ref 3.5–5.2)
RBC # BLD AUTO: 3.78 10*6/MM3 (ref 4.14–5.8)
SODIUM SERPL-SCNC: 139 MMOL/L (ref 136–145)
WBC NRBC COR # BLD AUTO: 6.57 10*3/MM3 (ref 3.4–10.8)

## 2024-08-04 PROCEDURE — 84132 ASSAY OF SERUM POTASSIUM: CPT

## 2024-08-04 PROCEDURE — 80048 BASIC METABOLIC PNL TOTAL CA: CPT

## 2024-08-04 PROCEDURE — G0378 HOSPITAL OBSERVATION PER HR: HCPCS

## 2024-08-04 PROCEDURE — 94664 DEMO&/EVAL PT USE INHALER: CPT

## 2024-08-04 PROCEDURE — 94799 UNLISTED PULMONARY SVC/PX: CPT

## 2024-08-04 PROCEDURE — 97165 OT EVAL LOW COMPLEX 30 MIN: CPT

## 2024-08-04 PROCEDURE — 83735 ASSAY OF MAGNESIUM: CPT | Performed by: FAMILY MEDICINE

## 2024-08-04 PROCEDURE — 25010000002 HEPARIN (PORCINE) PER 1000 UNITS: Performed by: STUDENT IN AN ORGANIZED HEALTH CARE EDUCATION/TRAINING PROGRAM

## 2024-08-04 PROCEDURE — 97161 PT EVAL LOW COMPLEX 20 MIN: CPT

## 2024-08-04 PROCEDURE — 25010000002 METHYLPREDNISOLONE PER 40 MG: Performed by: FAMILY MEDICINE

## 2024-08-04 PROCEDURE — 99233 SBSQ HOSP IP/OBS HIGH 50: CPT | Performed by: INTERNAL MEDICINE

## 2024-08-04 PROCEDURE — 85027 COMPLETE CBC AUTOMATED: CPT

## 2024-08-04 RX ADMIN — SODIUM BICARBONATE 650 MG: 650 TABLET, ORALLY DISINTEGRATING ORAL at 16:10

## 2024-08-04 RX ADMIN — SODIUM ZIRCONIUM CYCLOSILICATE 10 G: 10 POWDER, FOR SUSPENSION ORAL at 08:37

## 2024-08-04 RX ADMIN — ALBUTEROL SULFATE 1.25 MG: 2.5 SOLUTION RESPIRATORY (INHALATION) at 20:01

## 2024-08-04 RX ADMIN — ALBUTEROL SULFATE 1.25 MG: 2.5 SOLUTION RESPIRATORY (INHALATION) at 10:33

## 2024-08-04 RX ADMIN — SODIUM BICARBONATE 650 MG: 650 TABLET, ORALLY DISINTEGRATING ORAL at 20:32

## 2024-08-04 RX ADMIN — METHYLPREDNISOLONE SODIUM SUCCINATE 40 MG: 40 INJECTION, POWDER, FOR SOLUTION INTRAMUSCULAR; INTRAVENOUS at 08:35

## 2024-08-04 RX ADMIN — FLUTICASONE PROPIONATE 2 SPRAY: 50 SPRAY, METERED NASAL at 08:35

## 2024-08-04 RX ADMIN — ALBUTEROL SULFATE 1.25 MG: 2.5 SOLUTION RESPIRATORY (INHALATION) at 14:38

## 2024-08-04 RX ADMIN — SODIUM BICARBONATE 650 MG: 650 TABLET, ORALLY DISINTEGRATING ORAL at 08:36

## 2024-08-04 RX ADMIN — ASPIRIN 81 MG: 81 TABLET, COATED ORAL at 08:35

## 2024-08-04 RX ADMIN — Medication 1 TABLET: at 08:36

## 2024-08-04 RX ADMIN — MONTELUKAST SODIUM 10 MG: 10 TABLET, FILM COATED ORAL at 20:32

## 2024-08-04 RX ADMIN — HEPARIN SODIUM 5000 UNITS: 5000 INJECTION INTRAVENOUS; SUBCUTANEOUS at 14:06

## 2024-08-04 RX ADMIN — HEPARIN SODIUM 5000 UNITS: 5000 INJECTION INTRAVENOUS; SUBCUTANEOUS at 06:21

## 2024-08-04 RX ADMIN — Medication 10 MG: at 20:32

## 2024-08-04 RX ADMIN — CETIRIZINE HYDROCHLORIDE 10 MG: 10 TABLET ORAL at 08:35

## 2024-08-04 RX ADMIN — Medication 10 ML: at 08:43

## 2024-08-04 RX ADMIN — BUMETANIDE 0.5 MG: 1 TABLET ORAL at 08:35

## 2024-08-04 RX ADMIN — DRONEDARONE 400 MG: 400 TABLET, FILM COATED ORAL at 08:35

## 2024-08-04 RX ADMIN — METOPROLOL SUCCINATE 50 MG: 50 TABLET, EXTENDED RELEASE ORAL at 08:36

## 2024-08-04 RX ADMIN — IPRATROPIUM BROMIDE 0.5 MG: 0.5 SOLUTION RESPIRATORY (INHALATION) at 10:33

## 2024-08-04 RX ADMIN — IPRATROPIUM BROMIDE 0.5 MG: 0.5 SOLUTION RESPIRATORY (INHALATION) at 14:38

## 2024-08-04 RX ADMIN — ATORVASTATIN CALCIUM 20 MG: 10 TABLET, FILM COATED ORAL at 20:32

## 2024-08-04 RX ADMIN — METHYLPREDNISOLONE SODIUM SUCCINATE 40 MG: 40 INJECTION, POWDER, FOR SOLUTION INTRAMUSCULAR; INTRAVENOUS at 20:32

## 2024-08-04 RX ADMIN — PANTOPRAZOLE SODIUM 40 MG: 40 TABLET, DELAYED RELEASE ORAL at 08:43

## 2024-08-04 RX ADMIN — DRONEDARONE 400 MG: 400 TABLET, FILM COATED ORAL at 20:33

## 2024-08-04 RX ADMIN — IPRATROPIUM BROMIDE 0.5 MG: 0.5 SOLUTION RESPIRATORY (INHALATION) at 20:01

## 2024-08-04 RX ADMIN — HEPARIN SODIUM 5000 UNITS: 5000 INJECTION INTRAVENOUS; SUBCUTANEOUS at 21:34

## 2024-08-04 RX ADMIN — ISOSORBIDE MONONITRATE 30 MG: 30 TABLET, EXTENDED RELEASE ORAL at 08:36

## 2024-08-04 RX ADMIN — ALBUTEROL SULFATE 1.25 MG: 2.5 SOLUTION RESPIRATORY (INHALATION) at 06:11

## 2024-08-04 RX ADMIN — IPRATROPIUM BROMIDE 0.5 MG: 0.5 SOLUTION RESPIRATORY (INHALATION) at 06:11

## 2024-08-04 RX ADMIN — Medication 10 ML: at 20:33

## 2024-08-04 NOTE — PROGRESS NOTES
[facility] [place] [facility]    Patient: Karoline Prater  1940   MR# [id number]   Acct# [id number]  08/04/24   09:36 CDT  Referring Provider: Zachary Lloyd MD    Chief Complaint: Shortness of breath  Interval history: He is seen awake and alert resting in bed.  Oxygen saturations stable on 2 L nasal cannula.  He tells me he is feeling pretty good this morning.  He feels like he is breathing okay just laying in bed but tells me he has not been out of bed yet.  CT chest reviewed showing right lung mass, emphysema and pulmonary fibrosis.  He is receiving Solu-Medrol 40 mg every 12 hours.  Bumex continues.  BUN 33, creatinine 2.44.  No documented fevers.  Plan on continuing current dose of Solu-Medrol today.  Further recommendations per Dr. Crocker to follow.  Meds:  albuterol, 1.25 mg, Nebulization, 4x Daily - RT   And  ipratropium, 0.5 mg, Nebulization, 4x Daily - RT  aspirin, 81 mg, Oral, Daily  atorvastatin, 20 mg, Oral, Nightly  bumetanide, 0.5 mg, Oral, Daily  cetirizine, 10 mg, Oral, Daily  dronedarone, 400 mg, Oral, Q12H  fluticasone, 2 spray, Each Nare, Daily  heparin (porcine), 5,000 Units, Subcutaneous, Q8H  isosorbide mononitrate, 30 mg, Oral, Q24H  melatonin, 10 mg, Oral, Nightly  methylPREDNISolone sodium succinate, 40 mg, Intravenous, Q12H  metoprolol succinate XL, 50 mg, Oral, Daily  montelukast, 10 mg, Oral, Nightly  multivitamin with minerals, 1 tablet, Oral, Daily  pantoprazole, 40 mg, Oral, Daily  sodium bicarbonate, 650 mg, Oral, TID  sodium chloride, 10 mL, Intravenous, Q12H           Physical Exam:  SpO2 Percentage    08/04/24 0611 08/04/24 0618 08/04/24 0802   SpO2: 98% 97% 92%     Body mass index is 28.62 kg/m².   Temp:  [97.5 °F (36.4 °C)-99 °F (37.2 °C)] 98.1 °F (36.7 °C)  Heart Rate:  [66-97] 97  Resp:  [16-21] 16  BP: (102-144)/(61-87) 144/87  Intake/Output Summary (Last 24 hours) at 8/4/2024 0936  Last data filed at 8/4/2024 0846  Gross per 24 hour  1. Blood test today, BMP and proBNP.  2. Echo and follow up with Dr. Jackson as scheduled.     Intake 120 ml   Output 2350 ml   Net -2230 ml     Physical Exam  Constitutional:       General: He is not in acute distress.     Interventions: Nasal cannula in place.      Comments: 2l   HENT:      Head: Normocephalic.      Nose: Nose normal.      Mouth/Throat:      Mouth: Mucous membranes are moist.   Eyes:      General: No scleral icterus.  Cardiovascular:      Rate and Rhythm: Normal rate.   Pulmonary:      Effort: No respiratory distress.      Breath sounds: Decreased breath sounds and rales present.   Abdominal:      General: There is no distension.   Neurological:      Mental Status: He is alert. Mental status is at baseline.   Psychiatric:         Mood and Affect: Mood normal.         Behavior: Behavior is cooperative.         Electronically signed by MARITZA Walters, 8/4/2024, 09:36 CDT      Physician substantive portion: medical decision making  Result Review  Laboratory Data:  Results from last 7 days   Lab Units 08/04/24  0412 08/03/24  0335 08/02/24  1604   WBC 10*3/mm3 6.57 5.22 5.71   HEMOGLOBIN g/dL 11.6* 10.6* 11.0*   PLATELETS 10*3/mm3 218 204 203     Results from last 7 days   Lab Units 08/04/24  0412 08/03/24  0335 08/02/24  1608 08/02/24  1604   SODIUM mmol/L 139 139  --  139   POTASSIUM mmol/L 5.3* 4.7  --  5.4*   CO2 mmol/L 24.0 25.0  --  23.0   BUN mg/dL 33* 27*  --  30*   CREATININE mg/dL 2.44* 2.46*  --  2.44*   LACTATE mmol/L  --   --  1.4  --          Microbiology Results (last 10 days)       Procedure Component Value - Date/Time    Respiratory Panel PCR w/COVID-19(SARS-CoV-2) JORDYN/MATILDE/BRYAN/PAD/COR/ASHER In-House, NP Swab in UTM/VTM, 2 HR TAT - Swab, Nasopharynx [[id number]]  (Normal) Collected: 08/02/24 1610    Lab Status: Final result Specimen: Swab from Nasopharynx Updated: 08/02/24 1711     ADENOVIRUS, PCR Not Detected     Coronavirus 229E Not Detected     Coronavirus HKU1 Not Detected     Coronavirus NL63 Not Detected     Coronavirus OC43 Not Detected     COVID19 Not Detected      Human Metapneumovirus Not Detected     Human Rhinovirus/Enterovirus Not Detected     Influenza A PCR Not Detected     Influenza B PCR Not Detected     Parainfluenza Virus 1 Not Detected     Parainfluenza Virus 2 Not Detected     Parainfluenza Virus 3 Not Detected     Parainfluenza Virus 4 Not Detected     RSV, PCR Not Detected     Bordetella pertussis pcr Not Detected     Bordetella parapertussis PCR Not Detected     Chlamydophila pneumoniae PCR Not Detected     Mycoplasma pneumo by PCR Not Detected    Narrative:      In the setting of a positive respiratory panel with a viral infection PLUS a negative procalcitonin without other underlying concern for bacterial infection, consider observing off antibiotics or discontinuation of antibiotics and continue supportive care. If the respiratory panel is positive for atypical bacterial infection (Bordetella pertussis, Chlamydophila pneumoniae, or Mycoplasma pneumoniae), consider antibiotic de-escalation to target atypical bacterial infection.           Recent films:  XR Chest 1 View    Result Date: 8/2/2024  EXAMINATION:  XR CHEST 1 VW-  8/2/2024 3:16 PM  HISTORY: Shortness of air. Lung cancer.  COMPARISON: 6/29/2024.  TECHNIQUE: Single view AP image.  FINDINGS: A mass in the upper to midlung zone on the right appears increased in size measuring approximately 4.8 cm. Fibrotic lung changes appear relatively stable. Pleural thickening/effusion on the right appears relatively stable. There is cardiomegaly. The thoracic aorta is atheromatous.       Impression: 1. A mass in the upper to midlung zone on the right is larger compared to the prior study measuring in the range of about 4.8 cm. 2. Relatively stable pulmonary fibrosis. Pleural thickening/effusion on the right also appears relatively stable. 3. Cardiomegaly.   The full report of this exam was immediately signed and available to the emergency room. The patient is currently in the emergency room.    This report was  signed and finalized on 8/2/2024 4:23 PM by Dr. Ramos Lagos MD.      Personal review of imaging : CXR shows mass in the right upper and midlung Leatha and pleural fibrosis and thickening.  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  History of tobacco use  Chronic respiratory failure and hypoxia on home oxygen  Hypertension  Hyperlipidemia  Chronic congestive diastolic heart failure with preserved ejection fraction  Atrial fibrillation  Thrombocytopenia and neutropenia/anemia requiring blood transfusion  Severe hearing impairment  Chronic kidney disease stage III  Allergic rhinitis    Recommend/plan:   Patient was seen in the follow-up visit in pulmonary rounds in medical floor today  He was sitting up in the chair on 2 L of oxygen and appears stable.  CT scan of the chest showed right lung mass with pulmonary fibrosis and emphysema.  Continue bronchodilator treatment and Solu-Medrol.  On Bumex.  Renal function abnormal but appears stable  Continue incentive spirometry and flutter valve.  DVT and stress ulcer prophylaxis.  Pain and anxiety control repeat labs imaging studies from time to time  Oncology following.  Nephrology has not yet been consulted to my knowledge.  Repeat labs imaging studies from time to time.  Patient is already on supplemental oxygen 2.5 L at home  Nutritional support.  Physical activity as tolerated.  Patient still remains a full code which needs to be addressed.  Palliative care consult would be a good idea.  Code status: Full.  Overall prognosis: Guarded.  We will follow and he will need long-term follow-up in the pulmonary clinic as well because he  missed his appointments after 2019 encounter with Dr. Becerra in [facility]    Time spent by me: 35 min    This visit was performed by both a physician and an [facility] RN.  I performed all aspects of the medical decision making as documented.    Electronically signed by     Tarik Crocker MD,  Pulmonologist/Intensivist   8/4/2024, 14:24 CDT

## 2024-08-04 NOTE — PLAN OF CARE
Goal Outcome Evaluation:  Plan of Care Reviewed With: patient           Outcome Evaluation: PT eval complete. Pt up in recliner upon arrival, AOx4 and minor complaints of SOA and chest pain due to just walking with nsg staff, SpO2 sat of 96%. Pt reports independence at home with use of rollator and striaght cane as needed. Today pt demo functional AROM and strength greater than 4/5 B LE. Pt ambulated 100' followed by short rest break due to desat to 83% on 3L, which he states is normal for him. Pt recovered within 2 minutes before returning 100' again to room and left recliner. Pt states he would not like PT services to continue to see him and he feels at his normal and knows when he needs to sit and rest. SpO2 91% on 3L at completion of session. PT  to sign off at this time and RN notified. Please reconsult with change in status or need regarding this pt care. Anticipate d/c home when medically stable.      Anticipated Discharge Disposition (PT): home

## 2024-08-04 NOTE — PROGRESS NOTES
HEMATOLOGY AND MEDICAL ONCOLOGY PROGRESS NOTE    Patient name: Karoline Prater  Patient : 1940  VISIT # [id number]  MR #[id number]  Room:     SUBJECTIVE:The patient is afebrile today.  Reviewed CT chest.  Review the changes appear stable.  Pulmonary has been consulted.  Reviewed the notes.  Patient was started Symbicort o and DuoNebs. Feels slightly better today       HISTORY OF PRESENT ILLNESS:    The patient is a 84 years old male who is well-established in my clinic with Dr. Jeff Michele.  He has a complex medical history and has several comorbidities to include a history of hypertension, chronic respiratory failure, heart failure with preserved EF 61-65%, pulmonary hypertension, history of DVT on anticoagulation, history of GERD, history of stage III CKD.  Patient was recently hospitalized at [facility].  The patient called the clinic on 2024 with complaints of increased shortness of breath over the last few days.  In addition.  Also complains of bilateral leg swelling.  Patient denies any chest pain.  Denies any hemoptysis.  Denies any fever or chills.  He was seen in the emergency department at [facility].  Troponin was mildly elevated.  proBNP 295.  He received one-time dose of Lasix 20 mg.  Imaging studies as below.  He was admitted to the hospitalist with consultation to oncology.     2024-chest x-ray-a mass in the upper to midlung zone on the right is larger compared to prior study measuring in the range of about 4.8 cm.  Relatively stable pulmonary fibrosis.  Pleural thickening/effusion on the right also appears relatively stable.  Findings of cardiomegaly.     Most recent CT scans     CT scan of the chest without contrast at [facility] on 2024 was compared to 2024 reported the following  1. A spiculated mass in the right upper lobe laterally is larger on the   current study.   2. There is consolidation around a previously described nodule in the   right lung apex  "likely related to posttreatment change.   3. Diffuse reticulonodular fibrotic changes throughout both lungs.   Emphysema.   4. Pleural thickening on the right that is more focal in the right lung   base laterally. Metastatic pleural disease is considered.   5. Ectasia of the ascending thoracic aorta measuring 4.3 cm. Main   pulmonary artery segment is dilated measuring 3.6 cm. Mild cardiomegaly. ...      CT scan of the abdomen pelvis at [facility] on 2024 was compared to 2023 and reported the followin. There is a mildly dilated proximal small bowel loop measuring 4 cm.  No other small bowel distention is seen. This could be transient or due  to partial obstruction. Gastric wall thickening likely related to under  distention. There is under distention of portions of the colon and  moderate stool in the colon.  2. 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  Fibrosis in the lung bases with emphysema.  3. Left renal cyst. A 6 mm nonobstructive renal stone lower pole on the  left.  4. Mildly enlarged prostate measuring 4.7 cm.  5. Air within the biliary tree likely related to prior sphincterotomy.  Prior cholecystectomy.  6. Coarsening of the trabecula in the right femoral neck and  trochanteric region could be related to early Paget's disease. There is  no cortical thickening. A lipo sclerosing myxofibrous tumor is felt to  be less likely as there is no sclerotic margin.     Dr Michele discussed with dr Cool scans findings  Dr. Cool was gracious enough to review imaging studies with the following assessment by Dr. Danny Cool:  \"I have reviewed the CT scan of the thorax on this admission for atrial fibrillation with rapid ventricular response.     He completed recent SBRT radiotherapy for 2 right lung nodules on 2024.     Review of the current CT scan appears consistent with postradiation change.  We cannot entirely rule out progression however this appears unlikely " "with the chronology and radiographic appearance.     He is scheduled to return to our department in approximately 1 month with follow-up CT scan of the thorax and we will follow this closely with serial CT scans of the thorax.\"     Dr Michele has rescheduled Mr. Prater to see me in follow-up on 9/18/2024 at 10:30 AM     He completed recent SBRT radiotherapy for 2 right lung nodules on February 7, 2024.      It was felt on visit 7/2/2024 that the overall findings were stable.     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     He has a remote history of a pancreatic mass identified and biopsied by Dr. Alexis on 10/29/03.  Pathology negative on open biopsy.    Fahad completed 4 cycles of combination chemotherapy with carboplatin, Keytruda, Alimta on 7/5/2019.   Maintenance Keytruda and Alimta every 3 weeks was delivered.   The final cycle number #25 of Keytruda/Alimta was delivered on 10/29/2020.  Harry has been on a chemotherapy holiday since October 2020 due to issues of dyspnea and shortness of breath, requiring episodic steroids.     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    Imaging studies with CT scan of the chest on 11/9/2023 and a follow-up PET scan on 12/7/2023 documented localized progressive disease in the RUL of the lung.  He was referred to Dr. Danny Cool at [facility] for SBRT.    SBRT by Dr. Danny Cool was delivered in 5 treatment fractions for a total dose of 5000 cGy. XRT was initiated 1/24/2024 and was completed on 2/7/2024    Imaging were performed on 3/20/2024 to assess response to treatment and restaging on a chemotherapy.     CT CHEST WO CONTRAST AT " [facility] on 3/20/2024: Compared to 11/08/2023  1.3 cm partially calcified subcarinal lymph node, unchanged.   2.0 x 1.5 x 1.9 cm right upper lobe mass, previously 2.0 x 2.5 x 1.9 cm   1.8 x 3.0 x 2.0 right upper lobe mass, previously 3.5 x 1.9 x 1.6 cm   Emphysematous changes with subpleural reticulations and possible honeycombing and fibrosis are noted with pleural thickening again observed along the right lateral chest wall extending to the diaphragmatic surface.   There are scattered subpleural calcifications     CT ABDOMEN PELVIS WO CONTRAST AT [facility] on 3/20/2024: compared to 11/08/2023  No evidence of metastasis in the abdomen/pelvis.         Mr. Prater was admitted on 6/29/2024 with chest pain on exertion elevated troponin level for cardiac evaluation and management.    ACTIVE or ASSOCIATED PROBLEMS:  Pulmonary fibrosis secondary to previous history of smoking and drilling rock for blasting at the [email or web address]   CKD associated anemia responding to Procrit.   Fahad has benign prostatic hypertrophy (BPH) that is stable on Flomax.   Orthostatic hypotension resolved with adjustment of metoprolol by Dr. Alexis   He takes Cozaar, metoprolol, Lasix and spironolactone without new cardiac issues.   Protonix continues without any new exacerbations of GERD.   Lower extremity edema overall has actually improved to some degree           DETAILED TUMOR AND HEMATOLOGICAL HISTORIES COPIED FROM MY OFFICE RECORDS     TUMOR HISTORY: Adenocarcinoma on the RUL of the lung 11/27/2018  Karoline had CT scans performed for new onset of weight loss of 13 pounds over the previous year , associated with increased fatigue.   He denied respiratory symptoms of shortness of air, cough or productive sputum.    A CT scan of the chest on 9/12/2018 had been ordered by Dr. Irving Alexis due to weight loss and identified a new lobulated right upper lobe 5.7 cm mass that extended back to the right hilum.   Numerous mediastinal lymph nodes ranging in size from  mm to 1.3 cm and a subcarnial lymph node that measured 1.5 x 1.5 x 3.5 cm.    A CT scan of the abdomen and pelvis with contrast on 9/12/2018 documented a 4.9 x 4 x 4 cm soft tissue mass with peripheral calcification immediately adjacent to the gallbladder in the head of the pancreas area.    An MRI of the abdomen and pelvis W & WO on 10/11/2018 identified in the 4.1 x 3.4 cm soft tissue mass in the subhepatic space, abutting the second portion of the duodenum with mild displacement of the duodenum. There does not appear to be involvement of the pancreas, and the pancreas appears within normal limits.  Differential diagnoses include a desmoid tumor, less likely leiomyoma of the wall of the duodenum or malignancy.   Dr. Michele requested a CT-guided biopsy of the right upper lobe mass.   Dr. Rosales, the radiologist, evaluated the area with a repeat CT scan of the chest on 10/11/2018.   He spoke with Dr. Michele indicating that he would not be able to perform the biopsy because the tumor was not accessible, it was under the scapula , which blocked access and therefore the biopsy procedure was canceled.    On the CT scan of the chest on 10/11/2018 measurements of the RUL lung mass was 5.7 x 3.2 cm with irregular margins suspicious for a primary lung neoplasm. Soft tissue associated with the tumor appeared to extend into the bronchus supplying this portion of the RUL of the lung.   Dr. Rosales indicated that the mass had an endobronchial component and should be easily assessable via bronchoscopy.    The chest CT also included a portion of the upper abdomen where a soft tissue mass was described in an addendum report. In the subhepatic space measuring 4.0 x 3.9 cm, displacing the second portion of the duodenum medially.  A referral was made to Dr. Becerra for consideration for bronchoscopy for biopsy.    On 10/24/2018, Dr. Gareth Becerra performed a bronchoscopy with EBUS guided biopsy of a 3 cm subcarinal lymph node along  with bronchoalveolar lavage of the posterior segment of the RUL of the lung.  The final pathology of the station 7 lymph node only revealed a background of small mature lymphocytes with clusters of histiocytes suggestive of granuloma.  Negative for malignant cells.  Kinyoun and GMS stains were negative for AFB and fungal organisms respectively.  The bronchial washings were negative for malignant cells as well.     Mr. Prater was referred to Dr. Meenakshi Polanco at [facility] in [place], [place], for navigational bronchoscopy and biopsy under ultrasound.    On 11/27/2018 Dr. Meenakshi Polanco performed a bronchoscopy, navigational protocol, endobronchial ultrasound and biopsy of the RUL of the lung mass along with multiple lymph node station Biopsies.  Pathology revealed the following:  Poorly differentiated Adenocarcinoma from the RUL of the lung mass on biopsy and bronchoalveolar lavage consistent with a lung primary.   All lymph nodes sampled were NEGATIVE for malignant cells including stations 10L, 4L, station 7, 10R, 4R.   IHC studies were positive for CK7, TTF-1 and Napsin A.   IHC studies on tumor cells were negative for CDX2, CK5/6, and p63   Further lung profile molecular testing is pending    All of the above was discussed at length with Mr. Prater at his 12/13/2018 clinic visit.   He was agreeable for referral for consideration of resection of the lung lesion.     He is comfortable with and would like a referral to Dr. Ulysses Bardales ([phone]) at [facility], [address] [place] [address], [address], [place] [address].  Logistics, however, are planning a big part in his decision making and he may decide to have surgery done in the [place] area.    If he decides to have surgery in the [place], referral will be made to Dr. Frandy Patel.     CT chest with contrast 2/18/2019 at [facility] compared to 9/12/2018 revealed a 4.9 x 3.5 cm spiculated RUL lung mass which actually decreased in size  from a prior value of 6.1 x 3.6 cm.   Subhepatic mass adjacent to the descending duodenum had increased in size significantly to 4.3 x 9 cm compared to 4.1 x 3.4 cm on the 10/11/18 MRI of the abdomen.    CT of the abdomen and pelvis without contrast on 3/20/2019 at [facility] was compared to outpatient CT data and pelvis on 9/12/2018 an MRI of the abdomen from 10/11/2018. A soft tissue mass was again encountered in the subhepatic space which abutted the distal stomach and proximal duodenum measuring 8.9 x 5.4 cm which has increased considerably from a prior measurement of 4.1 x 3.4 cm. Medial to the left renal hilum a 3.5 cm lobular mass causing some mild left-sided hydronephrosis.    Mr. Prater was referred to Dr. Frandy Patel who saw him on 1/22/2019 anticipating plans for a curative resection.     Dr. Michele received a phone call on 2/20/2019 from Dr. Patel , indicating that the previously documented an abdominal pancreatic area mass had doubled in size and was causing compression into the left kidney/renal pelvis producing a hydroureter.     Dr. Patel arranged a referral to Dr. Sylvester who will be placing a stent 3/8/2019.    Mr. Prater was scheduled to be seen by gastroenterology at [facility] on 3/13/2019 for EGD/EUS assessment and biopsy of the enlarging peripancreatic/duodenal area mass.  With a decrease in the lung mass and increased size of the subhepatic mass-surgical resection was abandoned at present pending further evaluation of the subhepatic mass.  Dr. Michele discussed treatment options with the unresectable lung mass and the per he pancreatic mass and recommended a combination of Keytruda, Alimta, carboplatin.  He was subsequently admitted to [facility] 4/26 - 4/30/2019 with abdominal pain and melena. WBC pinky was 0.75 with ANC 0.34. PLT did drop to 24,000. He did have duodenal ulcerations that were bleeding on endoscopy. He was stabilized but then readmitted from 5/8 - 5/10/2019 with recurrent melanoma.  A repeat endoscopy was performed. It was felt that exacerbation of his bleeding from the duodenal came about by his thrombocytopenia from treatment. Subsequent dosing was adjusted and Neulasta was added.    CT chest without contrast at [facility] on 6/27/2019 was compared to 2/18/2019 revealed that the right lung mass that extended into the right hilum has decreased from 5.2 x 3.5 down to 4.6 x 3.6. There is increased central cavitation in the mass consistent with tumor necrosis. Mediastinal lymphadenopathy is relatively stable.    CT abdomen and pelvis without contrast at [facility] on 6/27/2019 was compared to 4/26/2019 revealed complete resolution of the previously identified duodenal/subhepatic space soft tissue mass. The previously identified heterogenous enhancing lesion in the left renal pelvis was much less prominent but there does continue to be some mild left caliectasis.    I reviewed the CT results with Dr. Michele on 7/5/2019 who then relayed them as well to Karoline. We've had excellent results from this combination of therapy.    He completed the fourth combination therapy of Keytruda, carbo, Alimta on 7/5/2019 and then transitioned to maintenance Keytruda plus Alimta.    CT chest without contrast at [facility] on 1/9/2020 was compared to 10/17/2019 revealing:   A stable spiculated RUL nodule/mass with similar mediastinal adenopathy.   Questionable right hilar adenopathy with diminished assessment due to lack of IV contrast.   Advanced emphysema with severe pulmonary fibrosis.   No new pulmonary nodules.    CT abdomen and pelvis without contrast at [facility] on 1/9/2020 was compared to 10/17/2019 revealing:   Mildly prominent aortocaval RP lymph nodes with position just below the level of the renal vein worrisome for potential lymphatic metastases. This is similar to 10/17/2019 but increased from 4/26/2019.   Pneumobilia without discrete suspicious focal lesion in the liver.   Wall thickening of the duodenum.   Similar and  stable renal lesions favoring cysts.    He has had numerous endoscopies within the past year.   While he was having an Endo EUS and had cardiac issues and was actually admitted to the intensive care unit.     CT abdomen and pelvis without contrast on 7/16/2020 at [facility] was compared to 1/9/2020 and documented:  1.5 x 0.9 cm aortocaval lymph node, previously 1.9 x 1.2 cm  No new abnormality seen    CT chest without contrast on 11/12/2020 at [facility] ws compared to 1/9/2020 and documented:  Mixed response therapy with interval decrease in size in the RIGHT upper lobe pulmonary nodule but increase in size and mediastinal lymph nodes.  In addition, there is severe emphysema with findings of severe pulmonary fibrosis. Interval worsening of interstitial opacities bilaterally as well as suggestion of some pleural nodularity. Lymphangitic spread of malignancy should be considered.  Small pleural effusion on the RIGHT is new    CT abdomen and pelvis without contrast on 11/12/2020 at [facility] was compared to 7/16/2020 and documented:  The aortic caval node described on the comparison study is not significantly changed in size when measured at the same location.  Nonobstructing renal calculus on the LEFT. No change in the indeterminate renal lesions on the LEFT, likely cysts.  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   Fahad has benign prostatic hypertrophy (BPH) that is stable on Flomax.   Orthostatic hypotension resolved with adjustment of metoprolol by Dr. Alexis   He takes Tambocor, metoprolol without new cardiac issues.   Protonix continues without any new exacerbations of GERD.   Lower extremity edema overall has actually improved to some degree.    Fahad  received his final cycle #25 of Keytruda/Alimta on 10/29/2020.   Treatment was held since that time due to issues of dyspnea and shortness of breath.    A course of steroids (prednisone) was initiated and antibiotics also delivered and although his respiratory issues improved, he has continued to have issues with weight loss.    CXR on 12/17/2020 documented no interval change, but in my personal review of the x-ray, there is significant pulmonary fibrosis not significantly changed compared to the x-ray from October 2020.    I discussed the findings on the chest x-ray and the comparison at his clinic visit on 12/30/2020. He does not appear to be improving but rather quite stable.  Vianey has pulmonary fibrosis as documented on a CT scan of the chest without contrast at [facility] on 11/12/2020.  Fahad is in agreement to go on a chemotherapy holiday with a repeat CT scan of the chest in 3 months and monitor his situation clinically and radiographically without systemic intervention going forward.    CT CHEST WO contrast on 3/17/2021, compared to 11/12/2020 at [facility] documented:   Mixed response therapy with interval decrease in size in the RIGHT upper lobe pulmonary nodule but increase in size and mediastinal lymph nodes.  Slight interval decrease in size in the RIGHT upper lobe nodule/mass measuring 4.7 x 2.2 cm today, previously 5.0 x 2.4 cm. RIGHT apical nodular density measuring up to 0.6 cm, previously 0.4 cm.  In addition, there is severe emphysema with findings of severe pulmonary fibrosis. Interval worsening of interstitial opacities bilaterally as well as suggestion of some pleural nodularity. Lymphangitic spread of malignancy should be considered.  Small pleural effusion on the RIGHT is new.    CT ABD & PELVIS WO contrast on 3/17/2021, compared to 11/12/2020, at [facility] documented:  Nonobstructing calculus lower pole left kidney  Low-density lesions associated with the left renal contour probably proteinaceous cyst these  are unchanged  Chronic right lateral pleural complex in the lung base with bilateral small basilar pneumothoraces..     XR CHEST (2 VW) 4/21/2021 at [facility] , compared to December 17, 2020, documented:  Advanced interstitial fibrotic changes noted throughout the pulmonary parenchyma unchanged from December 17, 2020.  Small to moderate right lateral pleural complex. This may be pleural fluid or thickening is unchanged December 17, 2020.    XR CHEST (2 VW) 6/16/2021:  2.5 cm area of residual nodularity versus scarring in the RUL zone.  Probable small pleural effusions.  Bilateral interstitial infiltrates stable compared to the 2 most recent studies but increased compared to the 2019 study.   There may beunderlying pulmonary fibrosis that is worsening.   Pulmonary edema is not ruled out.     CT CHEST WO CONTRAST DIAGNOSTIC at [facility]- 11/15/2021:Comparison: CT chest without contrast 8/25/2021  3.0 x 2.2 cm spiculated mass in the right upper lobe, stable  There is pleural thickening lateral to the mass with bands of probable fibrosis, stable   4 mm RUL there is a small stellate shaped nodule, previously 5 mm  3 mm nodule in the right lower lobe posterior to the major fissure, unchanged  There is a calcified granuloma in the medial basal segment left lower lobe   Small calcified pleural plaques in the upper left hemithorax as before.  There are several partially calcified mediastinal lymph nodes which appears stable  There is increased pleural thickening along the anterior margin of the right lower lobe.     CT ABDOMEN PELVIS WO CONTRAST at [facility]- 11/15/2021:Comparison: CT abdomen pelvis without contrast 3/17/2021  Review of lung windows demonstrates extensive reticular interstitial fibrosis in the lower lung zones, as well as loculated pleural fluid in the right lateral lung base with pleural thickening   10 mm.hyperattenuating exophytic nodule at the inferior pole the left kidney   7 mm nephrolithiasis at the inferior pole the  left kidney.  The prostate gland is enlarged  There is grade 1 spondylolisthesis at L5-S1 with disc space collapse. This is stable    XR CHEST (2 VW) on 3/3/2022 at [facility], compared to 8/16/21, documented:  A 1.7 cm opacity in the right midlung zone appears smaller on the current study, previously measuring 2.5 cm.  Stable blunting of the costophrenic angles right greater than left.  Coarse interstitial lung disease appears relatively stable. Probable interstitial fibrosis    X-RAYS OF THE CHEST at [facility] on 6/29/2022:  The lungs show moderately severe fibrosis and COPD with pleural reaction blunting the right costophrenic sulcus  Small area consolidation noted right upper lobe    X-RAYS OF THE CHEST at [facility] on 9/28/2022:  The right-sided MediPort tip overlying the projection of the superior vena cava  The aorta is calcified and tortuous  The heart is enlarged in size.   There are diffuse interstitial opacities throughout the parenchyma bilaterally with a small right effusion.   There is a nodular density in the right upper lung field.    The RUL abnormality on the chest x-ray from today is likely residual scarring noted on the previous CT scan from November 2021.    CT chest without contrast at [facility] on 11/16/2022:COMPARISON: 11/15/2021, 8/25/2021  9 mm RIGHT upper lobe pulmonary nodule, previously 4 mm  3.0 x 1.9 cm spiculated mass in the RIGHT upper lobe abutting the major minor fissures, unchanged  Chronic interstitial thickening, bronchiectasis, and interstitial fibrosis is similar to multiple prior studies.   Small RIGHT pleural effusion.   No change in a few borderline prominent partially calcified mediastinal lymph nodes    CT ABDOMEN PELVIS WO CONTRAST at [facility] on 11/16/2022:COMPARISON: 11/15/2021   There is mild cardiomegaly with mitral annulus calcifications and coronary artery calcification  Trace pericardial effusion or pericardial thickening is present.  There is interstitial fibrosis within the lung bases with  honeycombing.   There is a loculated effusion within the right lateral costophrenic angle stable from the previous exam  There are cysts involving the left kidney.   More complex exophytic lesions involving the posterior midpole and the left lower pole may represent hemorrhagic cysts.  Anterolisthesis of L4 on L5 and L5 on S1 is present suggesting subluxation at the level of the facets at L4-L5  The prostate gland is mildly enlarged    CT CHEST WO on 11/9/2023 at [facility], compared to 11/16/22  multiple mediastinal lymph nodes, most of which are partially calcified which appear similar in size and number since the prior study. The largest measure up to 1.3 cm short axis subcarinal region and 1.2 cm short axis AP window  2.6 x 1.9 cm anterior right apical nodular consolidation, significantly increased in size, previously 0.9 cm.  3.6 x 1.9 cm Spiculated nodule right upper lobe, unchanged in size  1.2 x 1.1 cm additional perifissural opacity along the anterior minor fissure   Emphysema, bronchial thickening and peripheral reticular opacities present throughout both lungs. Stable emphysema.  Trace amount of pleural fluid lateral right lung base  Degenerative changes present in the spine. No discrete osseous lesion detected    CT ABDOMEN PELVIS WO on 11/9/23 at [facility], compared to 1/5/23  Pneumobilia, increased from prior. This may be seen following sphincterotomy or other hepatobiliary procedures. Recommend correlation with operative history and hepatic enzyme levels. No portal venous gas or bowel pneumatosis  Nonobstructing let nephrolithiasis and unchanged left renal cysts  Moderate quantity of stool throughout the colon    PET CT SKULL BASE TO MID THIGH on 12/13/23, compared to CT 11/08/23  2.6 x 1.9 cm anterior right apical nodular consolidation, SUV 14.8, consistent with malignancy until proven otherwise  3.6 x 1.9 cm RUL spiculated nodule, SUV 5.34, consistent with increased likelihood of malignancy  No additional  significantly FDG avid nodules are seen within the lung fields   Right hilar lymph node SUV 3.90  Right suprahilar lymph node SUV 4.26  Enlarged subcarinal node SUV 2.58  Nonenlarged precarinal node SUV 2.26  AP window node SUV 2.82  Additional smaller/less FDG avid nodes not individually described  In the head and neck, the included portions of the orbits and paranasal sinuses demonstrate normal levels of activity   The examination demonstrates a generally physiologic distribution of activity in the abdomen and pelvis   The examination demonstrates a generally physiologic distribution of activity in the included portions of the skeleton and extremeties     Fahda continues to have chronic dyspnea on exertion associated with pulmonary fibrosis from smoking history 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    Imaging studies with CT scan of the chest on 11/9/2023 and a follow-up PET scan on 12/7/2023 documented progressive disease in the RUL of the lung.  He was referred to Dr. Danny Cool at [facility] for SBRT.    SBRT by Dr. Danny Cool was delivered in 5 treatment fractions for a total dose of 5000 cGy. XRT was initiated 1/24/2024 and was completed on 2/7/2024    CT CHEST WO CONTRAST AT [facility] on 3/20/2024: Compared to 11/08/2023  1.3 cm partially calcified subcarinal lymph node, unchanged.   2.0 x 1.5 x 1.9 cm right upper lobe mass, previously 2.0 x 2.5 x 1.9 cm   1.8 x 3.0 x 2.0 right upper lobe mass, previously 3.5 x 1.9 x 1.6 cm   Emphysematous changes with subpleural reticulations and possible honeycombing and fibrosis are noted with pleural thickening again observed along the right lateral chest wall extending to the diaphragmatic surface.   There are scattered subpleural calcifications     CT ABDOMEN PELVIS WO CONTRAST AT [facility] on 3/20/2024: compared to 11/08/2023  No evidence of metastasis in the abdomen/pelvis.   Multiple left renal cysts. Nonobstructing  4 mm stone at the left lower pole   Mild colonic diverticulosis   No aggressive osseous lesion identified   Multilevel degenerative spondylosis and mild dextroconvex scoliosis. Grade 1 anterolisthesis at L4-L5 and L5-S1. Right unilateral L5 pars defect.    TREATMENT SUMMARY  Keytruda, carboplatin, Alimta initiated 4/18/2019 to be given every 3 weeks for 4 cycles - 4th cycle 7/5/2019, then Keytruda + Alimta as maintenance to continue indefinitely until progression or intolerable side effects   Fahad received cycle #25 of Keytruda/Alimta on 10/29/2020. He was then placed on an indefinite chemotherapy holiday on 12/30/2020  SBRT by Dr. Danny Cool was delivered in 5 treatment fractions for a total dose of 5000 cGy. XRT was initiated 1/24/2024 and was completed on 2/7/2024          #2   TUMOR HISTORY: Pancreatic mass diagnosed 10/29/03  Mr. Prater presented in October 2003 with obstructive jaundice.   A CT scan of the abdomen on 10/26/2003 documented a 3.2 x 4 x 4.2 cm mass in the head of the pancreas.     On 10/29/03 Dr. Alexis performed a resection of a portion of the head of the pancreas on Fahad Prater along with a Klaudia-en-Y procedure and a choledochojejunostomy for what was felt to be a pancreatic cancer. His pancreas was bypassed because of the findings.  Pathology of the biopsies were negative for malignancy showing a fibrosed pancreas.   Mr. Prater was referred to Dr. Vishal High in [place].    Dr. Vishal High performed ERCP with an EUS and biopsy on 02/26/04   Pathology again was negative for cancer or malignancy.   Routine periodic serologic and radiographic monitoring has not disclosed progression of the mass or development of new malignancy or increase in pancreatic tumor markers.     A CT scan of the abdomen and pelvis with contrast on 9/12/2018 documented a 4.9 x 4 x 4 cm soft tissue mass with peripheral calcification immediately adjacent to the gallbladder in the head of the pancreas area.    An MRI of  the abdomen and pelvis W & WO on 10/11/2018 identified in the 4.1 x 3.4 cm soft tissue mass in the subhepatic space, abutting the second portion of the duodenum with mild displacement of the duodenum. There does not appear to be involvement of the pancreas, and the pancreas appears within normal limits.  Differential diagnoses include a desmoid tumor, less likely leiomyoma of the wall of the duodenum or malignancy.    CT of the abdomen and pelvis without contrast on 3/20/2019 at [facility] was compared to outpatient CT data and pelvis on 9/12/2018 an MRI of the abdomen from 10/11/2018. A soft tissue mass was again encountered in the subhepatic space which abutted the distal stomach and proximal duodenum measuring 8.9 x 5.4 cm which has increased considerably from a prior measurement of 4.1 x 3.4 cm. Medial to the left renal hilum a 3.5 cm lobular mass causing some mild left-sided hydronephrosis.    Mr. Prater was scheduled for an EGD/EUS by Dr. Bryson Moe as an outpatient procedure on 3/13/2019 for assessment and biopsy of the enlarging peripancreatic/duodenal area mass. Unfortunately, after initiation of the procedure, he began having ventricular tachycardia and the procedure had to be aborted. Karoline was transferred to the ICU for cardiology evaluation and stabilization. He remained hospitalized until 3/18/2019 when he was medically cleared for discharge.    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 No emergent urologic intervention was warranted and he received monitoring of renal function. He had a creatinine level of 1.9 at discharge.    PET scan on 4/2/2019 identified a soft tissue mass in the RUL measuring 1.2 x 3.5 cm with extension to the right anterior hilum with an SUV of 10.1. Evidence of mediastinal and hilar lymphadenopathy, with low-level metabolic activity. Interval increase in the size of a  large mass in the right upper abdomen inseparable from the duodenum measuring 10.7 x 6.9 cm compared to 5.4 x 8.9 cm on 2/20/2019 with an SUV of 23.6. Slight increase in 2 small inseparable masses medial to the hilum of the left kidney measuring 2.2 and 2.6 cm and the other measuring 3.9 cm with a maximum SUV of 18.6.    Cycle #1 of palliative chemotherapy with Carboplatin, Alimta and Keytruda was initiated 4/18/19 which should also cover this process.    Abdominal/pelvic CT 4/26/19 was performed at [facility] due to abdominal pain and melena. The RUQ mass, within the duodenum decreased to 6.8 x 6.2 cm (was 10.7 x 6.9 cm on PET 4/2/19)    CT abdomen and pelvis without contrast at [facility] on 6/27/2019 was compared to 4/26/2019 revealed complete resolution of the previously identified. Duodenal/subhepatic space soft tissue mass. The previously identified heterogenous enhancing lesion in the left renal pelvis was much less prominent but there does continue to be some mild left caliectasis.      CT abdomen and pelvis without contrast at [facility] on 1/9/2020 was compared to 10/17/2019 revealing:   Mildly prominent aortocaval RP lymph nodes with position just below the level of the renal vein worrisome for potential lymphatic metastases. This is similar to 10/17/2019 but increased from 4/26/2019.   Pneumobilia without discrete suspicious focal lesion in the liver.   Wall thickening of the duodenum.   Similar and stable renal lesions favoring cysts.    CT ABD & PELVIS WO contrast on 3/17/2021, compared to 11/12/2020, at [facility] documented:  Nonobstructing calculus lower pole left kidney  Low-density lesions associated with the left renal contour probably proteinaceous cyst these are unchanged  Chronic right lateral pleural complex in the lung base with bilateral small basilar pneumothoraces..     He has had numerous endoscopies within the past year. When he was having an Endo EUS he had cardiac issues and required to the intensive care unit.      This area will just be monitored on follow-up scans without further elective endoscopic surveillance..    Mr. Prater requests that imaging studies only be done yearly.     CT ABDOMEN PELVIS WO CONTRAST at [facility]- 11/15/2021:Comparison: CT abdomen pelvis without contrast 3/17/2021  Review of lung windows demonstrates extensive reticular interstitial fibrosis in the lower lung zones, as well as loculated pleural fluid in the right lateral lung base with pleural thickening   10 mm.hyperattenuating exophytic nodule at the inferior pole the left kidney   7 mm nephrolithiasis at the inferior pole the left kidney.  The prostate gland is enlarged  There is grade 1 spondylolisthesis at L5-S1 with disc space collapse. This is stable     CT ABDOMEN PELVIS WO CONTRAST at [facility] on 11/16/2022:COMPARISON: 11/15/2021   There is mild cardiomegaly with mitral annulus calcifications and coronary artery calcification  Trace pericardial effusion or pericardial thickening is present.  There is interstitial fibrosis within the lung bases with honeycombing.   There is a loculated effusion within the right lateral costophrenic angle stable from the previous exam  There are cysts involving the left kidney.   More complex exophytic lesions involving the posterior midpole and the left lower pole may represent hemorrhagic cysts.  Anterolisthesis of L4 on L5 and L5 on S1 is present suggesting subluxation at the level of the facets at L4-L5  The prostate gland is mildly enlarged    CT ABDOMEN PELVIS WO on 11/9/23 at [facility], compared to 1/5/23  Pneumobilia, increased from prior. This may be seen following sphincterotomy or other hepatobiliary procedures. Recommend correlation with operative history and hepatic enzyme levels. No portal venous gas or bowel pneumatosis  Nonobstructing let nephrolithiasis and unchanged left renal cysts  Moderate quantity of stool throughout the colon    CT CHEST WO CONTRAST AT [facility] on 3/20/2024: Compared to 11/08/2023  1.3  cm partially calcified subcarinal lymph node, unchanged.   2.0 x 1.5 x 1.9 cm right upper lobe mass, previously 2.0 x 2.5 x 1.9 cm   1.8 x 3.0 x 2.0 right upper lobe mass, previously 3.5 x 1.9 x 1.6 cm   Emphysematous changes with subpleural reticulations and possible honeycombing and fibrosis are noted with pleural thickening again observed along the right lateral chest wall extending to the diaphragmatic surface.   There are scattered subpleural calcifications     CT ABDOMEN PELVIS WO CONTRAST AT [facility] on 3/20/2024: compared to 11/08/2023  No evidence of metastasis in the abdomen/pelvis.   Multiple left renal cysts. Nonobstructing 4 mm stone at the left lower pole   Mild colonic diverticulosis   No aggressive osseous lesion identified   Multilevel degenerative spondylosis and mild dextroconvex scoliosis. Grade 1 anterolisthesis at L4-L5 and L5-S1. Right unilateral L5 pars defect.      #1   HEMATOLOGICAL HISTORY: IgG kappa MGUS- Dx: 02/23/06.  During the course of Mr. Prater’s follow up, an abnormally elevated protein was noted on one occasion, which led to workup including quantitative immunoglobulins.     An elevated IgG kappa was encountered. This has remained stable in the 2500 range since early 2006.     A bone marrow biopsy and aspirate on 02/23/06 was negative with only 4% plasma cells.     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     24 hour urine for immunoelectrophoresis did not reveal an M-spike.  Serology studies on 9/18/2018 documented an elevated, stable IgG of 2589 with an M spike of 0.7.   Immunofixation continued to reveal IgG monoclonal protein with kappa light chain specificity.      #2   HEMATOLOGICAL HISTORY: Iron deficiency anemia, 9/18/2018  Karoline had serology on 9/18/2018 that identified iron deficiency anemia.   His lab work was obtained at [facility].  An iron level was 12, iron saturation 7%, ferritin 256  Folate >20, and vitamin B12 1044.   Dr. Li placed Stacitami on ferrous  sulfate 325 mg daily on 9/25/2018 , but this failed to resolve the anemia.    An EGD by Dr. Randy Somers on 12/11/2018 documented a normal esophagus, normal stomach and a degree of duodenal deformity.  Gastric biopsy was urease negative.    Colonoscopy by Dr. Randy Somers on 1/9/2019 documented a polyp at 80 cm.  Pathology identified a tubular adenoma, negative for high-grade dysplasia.   Feraheme administered.    Labs on 3/13/2019:  Iron 25   Iron saturation 22%   TIBC 116   Ferritin >2000   Reticulocyte count 0.0578      Haptoglobin 341   Folate >20   Vitamin B12 945   Erythropoietin 13    He presented to [facility] on 4/26/2019 with melena and abdominal discomfort. He was subsequently admitted    EGD per Dr. Jj on 4/29/2019 revealed  LA grade (1 or more mucosal breaks greater than 5 mm, not extending between the tops of the 2 mucosal folds)?esophagitis with no bleeding found in the distal esophagus   Stomach was normal, biopsies for H. pylori taken.   Cardia and gastric fundus were normal on retroflexion   One nonbleeding cratered duodenal ulcer with no stigmata of bleeding was found in the duodenal bulb lesion was about 9 mm.   Many nonbleeding cratered, linear and superficial duodenal ulcers with no stigmata of bleeding were found in the second portion of the duodenum. The largest lesion was 6 mm in largest dimension. No mass encountered and anastomosis not seen.    He was admitted to [facility] on 5/8/2019 with melena, hematochezia, anemia, thrombocytopenia    Endoscopy performed by Dr. Bansal on 5/9/2019 revealed  Normal esophagus   Gastric mucosal variant. 2 erythematous spots in the antrum, ? AVM   Normal examined duodenum   Nothing found to account for symptoms   He developed pancytopenia from chemotherapy and did require transfusions.  His hemoglobin dropped to 7.3 on 6/25/2019 after his last treatment. He received 2 units packed red blood cells as an outpatient.    Serology 6/13/2019  Serum Fe - 117  TIBC  "- 587  Fe sat - 19.9%  Ferritin - 1,000  Iron levels have been adequately replaced. 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    TREATMENT SUMMARY  Feraheme 510 mg IV on 2/14 and 2/21/19   Venofer 200 mg IV daily 5/8 - 5/10/2019 as IP at [facility]   s/p 2 units pRBC on 4/26/19 and 2 units pRBC on 4/29/2019 as IP at [facility]?  s/p 2 units pRBC on?5/8/2019   s/p 2 pheresis plts on 5/8/2019  s/p 2 units pRBC as OP 6/26/2019         PHYSICAL EXAM:  /87 (BP Location: Left arm, Patient Position: Sitting)   Pulse 97   Temp 98.1 °F (36.7 °C) (Oral)   Resp 16   Ht 162.6 cm (64.02\")   Wt 75.7 kg (166 lb 12.8 oz)   SpO2 92%   BMI 28.62 kg/m²   CONSTITUTIONAL: Alert, appropriate, no acute distress, looks ill appearing, frail  EYES: Anicteric, EOM intact, pupils equal round   ENT: Mucous membranes moist, no oral pharyngeal lesions, external inspection of ears and nose are normal  NECK: Supple, no masses.  No palpable thyroid mass  CHEST/LUNGS: CTA bilaterally, normal respiratory effort   CARDIOVASCULAR: RRR, no murmurs.  No lower extremity edema  ABDOMEN: soft nontender, active bowel sounds, no HSM.  No palpable masses  EXTREMITIES: warm, full ROM in all 4 extremities, no focal weakness.  SKIN: warm, dry with no rashes or lesions  LYMPH: No cervical, clavicular, axillary, or inguinal lymphadenopathy  NEUROLOGIC: follows commands, nonfocal       CBC  Results from last 7 days   Lab Units 08/04/24  0412 08/03/24  0335 08/02/24  1604   WBC 10*3/mm3 6.57 5.22 5.71   HEMOGLOBIN g/dL 11.6* 10.6* 11.0*   HEMATOCRIT % 37.3* 35.4* 35.5*   PLATELETS 10*3/mm3 218 204 203         Lab Results   Component Value Date     08/04/2024    K 5.3 (H) 08/04/2024     08/04/2024    CO2 24.0 08/04/2024    BUN 33 (H) 08/04/2024    CREATININE 2.44 (H) 08/04/2024    GLUCOSE 170 (H) 08/04/2024    CALCIUM 10.2 08/04/2024    BILITOT 0.4 " 08/02/2024    ALKPHOS 132 (H) 08/02/2024    AST 42 (H) 08/02/2024    ALT 26 08/02/2024    AGRATIO 1.1 08/02/2024    GLOB 3.2 08/02/2024       Lab Results   Component Value Date    INR 1.04 06/29/2024    INR 1.29 (H) 01/05/2023    INR 1.18 (H) 08/25/2021    PROTIME 14.1 06/29/2024    PROTIME 16.2 (H) 01/05/2023    PROTIME 14.1 (H) 08/25/2021     RADIOLOGY    My assessment-I personally reviewed the results of the CT chest without contrast.  Evidence of advanced emphysematous changes with chronic interstitial and fibrotic changes on both lungs.  Evidence of a right upper lobe lung nodule with surrounding consolidation and right hilar opacification.      ASSESSMENT/PLAN:  #Chronic respiratory failure-worsening  Differential diagnosis to include worsening malignancy versus infectious process versus others.  CT chest without contrast-pending report/comparison  Consider pulmonary consultation  -Respiratory panel PCR-negative     # Lung cancer-patient treatment is on hold.  Most recent CT chest abdomen pelvis showed overall stability of disease.  Plan was to repeat CT chest now to assess disease stability versus progression.  Plan has been discussed with Dr. Danny Cool.  Patient recently received SBRT.  Keytruda is on hold.        CT scan of the chest without contrast at [facility] on 6/29/2024 was compared to 5/6/2024 reported the following  1. A spiculated mass in the right upper lobe laterally is larger on the   current study.   2. There is consolidation around a previously described nodule in the   right lung apex likely related to posttreatment change.   3. Diffuse reticulonodular fibrotic changes throughout both lungs.   Emphysema.   4. Pleural thickening on the right that is more focal in the right lung   base laterally. Metastatic pleural disease is considered.   5. Ectasia of the ascending thoracic aorta measuring 4.3 cm. Main   pulmonary artery segment is dilated measuring 3.6 cm. Mild cardiomegaly. ...      CT scan of  "the abdomen pelvis at [facility] on 2024 was compared to 2023 and reported the followin. There is a mildly dilated proximal small bowel loop measuring 4 cm.  No other small bowel distention is seen. This could be transient or due  to partial obstruction. Gastric wall thickening likely related to under  distention. There is under distention of portions of the colon and  moderate stool in the colon.  2. 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  Fibrosis in the lung bases with emphysema.  3. Left renal cyst. A 6 mm nonobstructive renal stone lower pole on the  left.  4. Mildly enlarged prostate measuring 4.7 cm.  5. Air within the biliary tree likely related to prior sphincterotomy.  Prior cholecystectomy.  6. Coarsening of the trabecula in the right femoral neck and  trochanteric region could be related to early Paget's disease. There is  no cortical thickening. A lipo sclerosing myxofibrous tumor is felt to  be less likely as there is no sclerotic margin.     Dr Michele discussed with dr Cool scans findings  Dr. Cool was gracious enough to review imaging studies with the following assessment by Dr. Danny Cool:  \"I have reviewed the CT scan of the thorax on this admission for atrial fibrillation with rapid ventricular response.     He completed recent SBRT radiotherapy for 2 right lung nodules on 2024.     Review of the current CT scan appears consistent with postradiation change.  We cannot entirely rule out progression however this appears unlikely with the chronology and radiographic appearance.     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  Tolerating     # Normocytic anemia  Continue to monitor.  Hemoglobin stable.  Hb 11.5/MCV " 98     #Bilateral leg edema  Ultrasound bilateral lower EXTR: Pending    #COPD exarcebation   Appreciated pulmonary recommendations.  Patient was started on Symbicort and DuoNeb.  Pulmonary feels this is consistent with COPD exacerbation.      PLAN  CT chest without contrast, pending report  Continue to monitor cytopenias  Continue current supportive care  Palliative care consultation    Chon Rico MD    08/04/24  08:42 CDT

## 2024-08-04 NOTE — THERAPY DISCHARGE NOTE
Acute Care - Occupational Therapy Initial Evaluation/Discharge  [facility]     Patient Name: Karoline Prater  : 1940  MRN: [id number]  Today's Date: 2024     Date of Referral to OT: 24         Admit Date: 2024       ICD-10-CM ICD-9-CM   1. Bilateral lower extremity edema  R60.0 782.3   2. Hypoxia  R09.02 799.02   3. Hypervolemia, unspecified hypervolemia type  E87.70 276.69   4. Impaired mobility [Z74.09]  Z74.09 799.89     Patient Active Problem List   Diagnosis    Dyspnea    Essential hypertension    NSVT (nonsustained ventricular tachycardia)    Malignant neoplasm of upper lobe of right lung    Stage 4 chronic kidney disease    Pancytopenia due to antineoplastic chemotherapy    Pneumonia due to infectious organism    Function kidney decreased    Cellulitis    Acute on chronic respiratory failure with hypoxia    Pulmonary fibrosis    Macrocytic anemia    Thrombocytopenia    Sepsis    Right pneumothorax    Hyperkalemia    Acute renal failure superimposed on chronic kidney disease    GERD without esophagitis    A-fib    Former smoker    Chest pain    Tachycardia    Bilateral lower extremity edema    Metastatic adenocarcinoma of the RUL    Chronic diastolic congestive heart failure    COPD with acute exacerbation    S/P radiation > 12 weeks     Past Medical History:   Diagnosis Date    Arthritis     Atrial fibrillation with rapid ventricular response 2019    Blindness of left eye     BPH with obstruction/lower urinary tract symptoms     Cancer     stomach & lung    CHF (congestive heart failure)     CKD (chronic kidney disease) stage 3, GFR 30-59 ml/min     COPD with acute exacerbation 8/3/2024    Coronary artery disease     Elevated cholesterol     Essential hypertension 10/02/2017    Fibrotic lung diseases     GERD (gastroesophageal reflux disease)     Hearing loss     History of transfusion     Hydronephrosis of left kidney     Hyperlipidemia     Hypertension     pt was taken off of  all of his medications for BP (atenolol, lisinopril, lasix) because his BP kept bottoming out so his primary dr told him to discontinue them 1-2 months ago (Jan/Feb 2019). pt states he takes no medications currently.    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 Patel is just going to keep an eye on it and not do surgery right now.    Mediastinal adenopathy 10/24/2018    [facility]    Monoclonal gammopathy of unknown significance (MGUS) 09/11/2018    Pancreatic mass     pt states he had this in 2013 but it went away on its own. Now recent CT shows it has come back so he is going to have an ultrasound on 3/13/19.    Shortness of breath      Past Surgical History:   Procedure Laterality Date    ABDOMINAL SURGERY      BRONCHOSCOPY N/A 10/24/2018    Procedure: BRONCHOSCOPY WITH BIOPSY, EBUS;  Surgeon: Gareth Becerra MD;  Location: [facility] OR;  Service: Pulmonary    CHOLECYSTECTOMY      COLONOSCOPY N/A 1/3/2019    Procedure: COLONOSCOPY WITH ANESTHESIA;  Surgeon: Randy Somers DO;  Location: [facility] ENDOSCOPY;  Service: Gastroenterology    CYSTOSCOPY, RETROGRADE PYELOGRAM AND STENT INSERTION Left 3/8/2019    Procedure: CYSTOSCOPY RETROGRADE BILATERAL PYELOGRAM;  Surgeon: Jos Sylvester MD;  Location: [facility] OR;  Service: Urology    ENDOSCOPY N/A 12/11/2018    Procedure: ESOPHAGOGASTRODUODENOSCOPY WITH ANESTHESIA;  Surgeon: Randy Somers DO;  Location: [facility] ENDOSCOPY;  Service: Gastroenterology    ENDOSCOPY N/A 4/29/2019    Procedure: ESOPHAGOGASTRODUODENOSCOPY WITH ANESTHESIA;  Surgeon: Lilliam Jj MD;  Location: [facility] OR;  Service: Gastroenterology    ENDOSCOPY N/A 5/9/2019    Procedure: ESOPHAGOGASTRODUODENOSCOPY WITH ANESTHESIA;  Surgeon: Pilo Bansal MD;  Location: [facility] ENDOSCOPY;  Service: Gastroenterology    EYE SURGERY Left 1964     FOOT SURGERY Right 1966    joint    FRACTURE SURGERY         OT ASSESSMENT FLOWSHEET (Last 12 Hours)       OT Evaluation and Treatment       Row Name 08/04/24 0944                   OT Time and Intention    Subjective Information complains of;pain  -EC        Document Type evaluation  -EC        Mode of Treatment occupational therapy;co-treatment  -EC           General Information    Patient Profile Reviewed yes  -EC        Prior Level of Function independent:;all household mobility;community mobility;ADL's;home management  -EC        Equipment Currently Used at Home rollator;shower chair;grab bar;ramp  -EC        Pertinent History of Current Functional Problem presents with worsening SOA Dx: CHF exacerbation PMH: recent PE  -EC        Existing Precautions/Restrictions fall;oxygen therapy device and L/min  -EC        Barriers to Rehab medically complex  -EC           Living Environment    Current Living Arrangements home  -EC        Home Accessibility wheelchair accessible  small step and then has a ramp  -EC        People in Home alone  -EC           Pain Assessment    Pain Intervention(s) Medication (See MAR);Repositioned;Ambulation/increased activity  -EC        Additional Documentation Pain Scale: FACES Pre/Post-Treatment (Group)  -EC           Pain Scale: FACES Pre/Post-Treatment    Pain: FACES Scale, Pretreatment 2-->hurts little bit  -EC        Posttreatment Pain Rating 2-->hurts little bit  -EC        Pre/Posttreatment Pain Comment SOA causes light chest pain  -EC           Cognition    Orientation Status (Cognition) oriented x 4  -EC           Range of Motion Comprehensive    Comment, General Range of Motion BUE ROM WFL  -EC           Strength Comprehensive (MMT)    Comment, General Manual Muscle Testing (MMT) Assessment B shoulders 4/5, distal BUE 4+/5  -EC           Activities of Daily Living    BADL Assessment/Intervention lower body dressing  -EC           Lower Body Dressing Assessment/Training     [place] Level (Lower Body Dressing) lower body dressing skills;independent  -EC        Position (Lower Body Dressing) unsupported sitting  -EC           BADL Safety/Performance    Impairments, BADL Safety/Performance endurance/activity tolerance;shortness of breath  -EC           Bed Mobility    Bed Mobility --  -EC        Comment, (Bed Mobility) up in chair  -EC           Functional Mobility    Functional Mobility- Ind. Level contact guard assist  -EC        Functional Mobility- Comment amb into hallway with 1 sitting RB  -EC           Transfer Assessment/Treatment    Transfers sit-stand transfer;stand-sit transfer  -EC           Sit-Stand Transfer    Sit-Stand [place] (Transfers) standby assist  -EC        Assistive Device (Sit-Stand Transfers) other (see comments)  required HHA on 1 occasion  -EC           Stand-Sit Transfer    Stand-Sit [place] (Transfers) standby assist  -EC           Safety Issues, Functional Mobility    Impairments Affecting Function (Mobility) endurance/activity tolerance;shortness of breath  -EC           Balance    Balance Assessment sitting static balance;sitting dynamic balance;standing static balance;standing dynamic balance  -EC        Static Sitting Balance independent  -EC        Dynamic Sitting Balance independent  -EC        Position, Sitting Balance supported;unsupported;sitting in chair  -EC        Static Standing Balance standby assist  -EC        Dynamic Standing Balance contact guard  -EC        Position/Device Used, Standing Balance supported;unsupported  -EC           Plan of Care Review    Plan of Care Reviewed With patient  -EC        Progress no change  -EC        Outcome Evaluation OT eval complete.  Pt A&Ox4 sitting up in chair on 3L O2 upon therapist arrival. c/o minor chest pain d/t SOA following recent mobility. He demos 4/5 B shoulder strength, all other BUE strength WFL. He was able perform distal LE dressing skills independently but it does require  increased effort from him. He stood & amb community distance requiring 1 sitting RB d/t SOA. He desat to 83% and took 2 min to recover back to the 90s. Once back in the chair, discussed role of OT with pt. Although he demos decreased act guadalupe, he feels this is baseline and is aware of his limitations/how to compensate for them. OT to sign off to pt request, encouraged pt to notify medical team if he experiences further fxnal decline. Anticipate d/c home once medically stable.  -EC           Vital Signs    Pre SpO2 (%) 96  -EC        O2 Delivery Pre Treatment nasal cannula  3L  -EC        Intra SpO2 (%) 83  -EC        O2 Delivery Intra Treatment nasal cannula  -EC        Post SpO2 (%) 91  -EC        O2 Delivery Post Treatment nasal cannula  -EC           Positioning and Restraints    Pre-Treatment Position sitting in chair/recliner  -EC        Post Treatment Position chair  -EC        In Chair notified nsg;call light within reach;encouraged to call for assist;reclined  -EC           Therapy Assessment/Plan (OT)    Date of Referral to OT 08/02/24  -EC        OT Diagnosis --  -EC        Rehab Potential (OT) --  -EC        Criteria for Skilled Therapeutic Interventions Met (OT) patient/family refuse skilled intervention at this time  -EC        Therapy Frequency (OT) evaluation only  -EC                  User Key  (r) = Recorded By, (t) = Taken By, (c) = Cosigned By      Initials Name Effective Dates    Jodee Irving, OTR/L 10/13/23 -                     Occupational Therapy Education       Title: PT OT SLP Therapies (Done)       Topic: Occupational Therapy (Done)       Point: ADL training (Done)       Description:   Instruct learner(s) on proper safety adaptation and remediation techniques during self care or transfers.   Instruct in proper use of assistive devices.                  Learning Progress Summary             Patient Acceptance, E, VU by EC at 8/4/2024 3740                         Point: Precautions  (Done)       Description:   Instruct learner(s) on prescribed precautions during self-care and functional transfers.                  Learning Progress Summary             Patient Acceptance, E, VU by  at 8/4/2024 1108                         Point: Body mechanics (Done)       Description:   Instruct learner(s) on proper positioning and spine alignment during self-care, functional mobility activities and/or exercises.                  Learning Progress Summary             Patient Acceptance, E, VU by  at 8/4/2024 1108                                         User Key       Initials Effective Dates Name Provider Type Discipline     10/13/23 -  Jodee Moncada, OTR/L Occupational Therapist OT                    OT Recommendation and Plan  Therapy Frequency (OT): evaluation only  Plan of Care Review  Plan of Care Reviewed With: patient  Progress: no change  Outcome Evaluation: OT eval complete.  Pt A&Ox4 sitting up in chair on 3L O2 upon therapist arrival. c/o minor chest pain d/t SOA following recent mobility. He demos 4/5 B shoulder strength, all other BUE strength WFL. He was able perform distal LE dressing skills independently but it does require increased effort from him. He stood & amb community distance requiring 1 sitting RB d/t SOA. He desat to 83% and took 2 min to recover back to the 90s. Once back in the chair, discussed role of OT with pt. Although he demos decreased act guadalupe, he feels this is baseline and is aware of his limitations/how to compensate for them. OT to sign off to pt request, encouraged pt to notify medical team if he experiences further fxnal decline. Anticipate d/c home once medically stable.  Plan of Care Reviewed With: patient  Outcome Evaluation: OT eval complete.  Pt A&Ox4 sitting up in chair on 3L O2 upon therapist arrival. c/o minor chest pain d/t SOA following recent mobility. He demos 4/5 B shoulder strength, all other BUE strength WFL. He was able perform distal LE dressing  skills independently but it does require increased effort from him. He stood & amb community distance requiring 1 sitting RB d/t SOA. He desat to 83% and took 2 min to recover back to the 90s. Once back in the chair, discussed role of OT with pt. Although he demos decreased act guadalupe, he feels this is baseline and is aware of his limitations/how to compensate for them. OT to sign off to pt request, encouraged pt to notify medical team if he experiences further fxnal decline. Anticipate d/c home once medically stable.          Outcome Measures       Row Name 08/04/24 1100             How much help from another is currently needed...    Putting on and taking off regular lower body clothing? 4  -EC      Bathing (including washing, rinsing, and drying) 4  -EC      Toileting (which includes using toilet bed pan or urinal) 4  -EC      Putting on and taking off regular upper body clothing 4  -EC      Taking care of personal grooming (such as brushing teeth) 4  -EC      Eating meals 4  -EC      AM-PAC 6 Clicks Score (OT) 24  -EC         Functional Assessment    Outcome Measure Options AM-PAC 6 Clicks Daily Activity (OT)  -EC                User Key  (r) = Recorded By, (t) = Taken By, (c) = Cosigned By      Initials Name Provider Type    EC Jodee Moncada, OTR/L Occupational Therapist                    Time Calculation:    Time Calculation- OT       Row Name 08/04/24 1009             Time Calculation- OT    OT Start Time 0950  +10 min CR  -EC      OT Stop Time 1009  -EC      OT Time Calculation (min) 19 min  -EC      OT Received On 08/04/24  -EC         Untimed Charges    OT Eval/Re-eval Minutes 29  -EC         Total Minutes    Untimed Charges Total Minutes 29  -EC       Total Minutes 29  -EC                User Key  (r) = Recorded By, (t) = Taken By, (c) = Cosigned By      Initials Name Provider Type    EC Jodee Moncada, OTR/L Occupational Therapist                    Therapy Charges for Today       Code Description Service  Date Service Provider Modifiers Qty    [id number] HC OT EVAL LOW COMPLEXITY 2 8/4/2024 Jodee Moncada, SANTANAR/L GO 1                 OT Discharge Summary  Anticipated Discharge Disposition (OT): home with assist    SUNITHA Crowe/ORACIO  8/4/2024

## 2024-08-04 NOTE — PROGRESS NOTES
Patient Name: Karoline Prater  Date of Admission: 8/2/2024  Today's Date: 08/04/24  Length of Stay: 0  Primary Care Physician: Irving Alexis MD    Subjective   Chief Complaint: Increasing shortness of breath  Shortness of Breath  Associated symptoms include leg swelling and wheezing. Pertinent negatives include no abdominal pain, chest pain or rhinorrhea.   Leg Swelling  Associated symptoms include coughing, fatigue and weakness. Pertinent negatives include no abdominal pain, chest pain, congestion or nausea.      HPI:  Vianey Salcido 84-year-old white male with past medical history of GERD, blindness of his left eye, hypertension, BPH, pancreatic cancer with metastasis to right lung, CHF, last EF was 61-65% with left ventricular diastolic dysfunction and mild pulmonary hypertension on 7/3/2024, CKD stage III, and fibrotic lung disease.  Patient presented to [facility] emergency department on 8/2/2024 with complaints of shortness of breath requiring continuous use of his as needed oxygenation with associated leg swelling.  Patient states his shortness of breath is worse with exertion, denies any chest pain, nausea, vomiting, loss of consciousness, fever or change in his urinary or bowel habits.  On admission patient's blood pressure was soft, other vital signs were stable.  Patient received IV Lasix 20 mg and states he is passing urine.  ED lab results troponin x 2 mildly elevated, potassium of 5.4, , proBNP of 295.5, BUN of 30, creatinine of 2.44, CBC unremarkable, respiratory panel negative and chest x-ray mass in the upper midlung zone on the right larger compared to prior study measuring the range of about 4.8 cm, stable pulmonary fibrosis with cardiomegaly.  Patient was admitted for continued observation and treatment    Today: Today patient is resting comfortably in bed on 2.5 L of oxygen with oxygen saturation remaining at 92%.  No family is at bedside.  Patient continues to be alert and  oriented and able to participate in assessment.  Patient states he is feeling slightly better today however he continues to be extremely short of breath with any exertion, he took a walk in the hallway with physical therapy and nursing today both had to keep patient on 3 to 4 L and after ambulation patient was at 76% on the 2 L, needing 4 L until rest periods to keep oxygen saturation greater than 90%.  Discussed the case with Dr. Crocker, pulmonology who believes that this is more of an exacerbation of COPD or his pulmonary fibrosis rather than an escalation of his adenosarcoma.  He continues to recommend Solu-Medrol 40 mg IV every 12 hours with de-escalation starting tomorrow if improvement of symptoms.  Bilateral lower extremity edema is significantly improved, mild erythema of the left leg remains.  All patient's questions were answered to the best my ability and he continues to be in agreement with current plan of care and he was notified that he would need to stay a few more days for IV steroids.         TREATMENT SUMMARY  Keytruda, carboplatin, Alimta initiated 4/18/2019 to be given every 3 weeks for 4 cycles - 4th cycle 7/5/2019, then Keytruda + Alimta as maintenance to continue indefinitely until progression or intolerable side effects   Fahad received cycle #25 of Keytruda/Alimta on 10/29/2020. He was then placed on an indefinite chemotherapy holiday on 12/30/2020  SBRT by Dr. Danny Cool was delivered in 5 treatment fractions for a total dose of 5000 cGy. XRT was initiated 1/24/2024 and was completed on 2/7/2024 08/03/24  0428 08/04/24  0455   Weight: 77.1 kg (170 lb) 75.7 kg (166 lb 12.8 oz)        Review of Systems   Constitutional:  Positive for fatigue.   HENT:  Negative for congestion, rhinorrhea and trouble swallowing.    Eyes: Negative.    Respiratory:  Positive for cough, shortness of breath and wheezing.    Cardiovascular:  Positive for leg swelling. Negative for chest pain.    Gastrointestinal:  Negative for abdominal distention, abdominal pain, diarrhea and nausea.   Genitourinary:  Negative for difficulty urinating and flank pain.   Musculoskeletal: Negative.    Skin:  Positive for pallor.   Allergic/Immunologic: Negative.    Neurological:  Positive for weakness. Negative for dizziness and syncope.   Hematological: Negative.    Psychiatric/Behavioral: Negative.        All pertinent negatives and positives are as above. All other systems have been reviewed and are negative unless otherwise stated.     Objective    Temp:  [97.4 °F (36.3 °C)-99 °F (37.2 °C)] 97.4 °F (36.3 °C)  Heart Rate:  [66-97] 79  Resp:  [16-21] 16  BP: ()/(49-87) 122/65  Physical Exam  Vitals and nursing note reviewed.   Constitutional:       Appearance: He is ill-appearing.      Comments: Patient is resting comfortably in bed on 2.5 L with oxygen saturation at 92%.  No family at bedside   HENT:      Head: Normocephalic.      Right Ear: Tympanic membrane normal.      Left Ear: Tympanic membrane normal.      Nose: Nose normal.      Mouth/Throat:      Mouth: Mucous membranes are moist.      Pharynx: Oropharynx is clear.   Eyes:      Pupils: Pupils are equal, round, and reactive to light.   Cardiovascular:      Rate and Rhythm: Normal rate and regular rhythm.      Pulses: Normal pulses.      Heart sounds: Normal heart sounds.   Pulmonary:      Effort: Accessory muscle usage present. No tachypnea or respiratory distress.      Breath sounds: Examination of the right-upper field reveals wheezing. Examination of the left-upper field reveals wheezing. Examination of the right-lower field reveals decreased breath sounds. Examination of the left-lower field reveals decreased breath sounds. No wheezing.      Comments: Significant exertional dyspnea  Chest:      Chest wall: No tenderness.   Abdominal:      General: Abdomen is flat.      Palpations: Abdomen is soft.   Genitourinary:     Comments: Voiding per  "urinal  Musculoskeletal:      Cervical back: Normal range of motion.      Right lower leg: No edema.      Left lower leg: Edema present.      Comments: Generalized weakness   Skin:     General: Skin is warm.      Capillary Refill: Capillary refill takes less than 2 seconds.      Coloration: Skin is pale.   Neurological:      General: No focal deficit present.      Mental Status: He is alert and oriented to person, place, and time.   Psychiatric:         Mood and Affect: Mood normal.         Behavior: Behavior normal.         Thought Content: Thought content normal.         Judgment: Judgment normal.         Results Review:  I have reviewed the labs, radiology results, and diagnostic studies.    Laboratory Data:   Results from last 7 days   Lab Units 08/04/24 0412 08/03/24  0335 08/02/24  1604   WBC 10*3/mm3 6.57 5.22 5.71   HEMOGLOBIN g/dL 11.6* 10.6* 11.0*   HEMATOCRIT % 37.3* 35.4* 35.5*   PLATELETS 10*3/mm3 218 204 203        Results from last 7 days   Lab Units 08/04/24 0412 08/03/24  0335 08/02/24  1604   SODIUM mmol/L 139 139 139   POTASSIUM mmol/L 5.3* 4.7 5.4*   CHLORIDE mmol/L 104 107 105   CO2 mmol/L 24.0 25.0 23.0   BUN mg/dL 33* 27* 30*   CREATININE mg/dL 2.44* 2.46* 2.44*   CALCIUM mg/dL 10.2 9.3 9.6   BILIRUBIN mg/dL  --   --  0.4   ALK PHOS U/L  --   --  132*   ALT (SGPT) U/L  --   --  26   AST (SGOT) U/L  --   --  42*   GLUCOSE mg/dL 170* 86 126*       Culture Data:   No results found for: \"BLOODCX\", \"URINECX\", \"WOUNDCX\", \"MRSACX\", \"RESPCX\", \"STOOLCX\"    Microbiology Results (last 10 days)       Procedure Component Value - Date/Time    Respiratory Panel PCR w/COVID-19(SARS-CoV-2) JORDYN/MATILDE/BRYAN/PAD/COR/ASHER In-House, NP Swab in UTM/VTM, 2 HR TAT - Swab, Nasopharynx [[id number]]  (Normal) Collected: 08/02/24 1610    Lab Status: Final result Specimen: Swab from Nasopharynx Updated: 08/02/24 1711     ADENOVIRUS, PCR Not Detected     Coronavirus 229E Not Detected     Coronavirus HKU1 Not Detected     " Coronavirus NL63 Not Detected     Coronavirus OC43 Not Detected     COVID19 Not Detected     Human Metapneumovirus Not Detected     Human Rhinovirus/Enterovirus Not Detected     Influenza A PCR Not Detected     Influenza B PCR Not Detected     Parainfluenza Virus 1 Not Detected     Parainfluenza Virus 2 Not Detected     Parainfluenza Virus 3 Not Detected     Parainfluenza Virus 4 Not Detected     RSV, PCR Not Detected     Bordetella pertussis pcr Not Detected     Bordetella parapertussis PCR Not Detected     Chlamydophila pneumoniae PCR Not Detected     Mycoplasma pneumo by PCR Not Detected    Narrative:      In the setting of a positive respiratory panel with a viral infection PLUS a negative procalcitonin without other underlying concern for bacterial infection, consider observing off antibiotics or discontinuation of antibiotics and continue supportive care. If the respiratory panel is positive for atypical bacterial infection (Bordetella pertussis, Chlamydophila pneumoniae, or Mycoplasma pneumoniae), consider antibiotic de-escalation to target atypical bacterial infection.             Radiology Data:   Imaging Results (Last 24 Hours)       Procedure Component Value Units Date/Time    CT Chest Without Contrast Diagnostic [[id number]] Collected: 08/04/24 1431     Updated: 08/04/24 1438    Narrative:      EXAMINATION: CT CHEST WO CONTRAST DIAGNOSTIC-      8/3/2024 11:08 AM     HISTORY: Lung cancer -worsening dyspena-Please compare to prior Ct chest  06/29/24 to assess progressive disease; R60.0-Localized edema;  R09.02-Hypoxemia; E87.70-Fluid overload, unspecified     In order to have a CT radiation dose as low as reasonably achievable  Automated Exposure Control was utilized for adjustment of the mA and/or  KV according to patient size.     CT Dose DLP = 126.65 mGy.cm.  (If there are multiple studies performed at the same time this  represents the total dose).     Noncontrast chest CT.  Axial, sagittal, and coronal  sequences.     COMPARISON:  6/29/2024.     Cardiomegaly.  Coronary artery calcification.  No mediastinal mass.     Relatively stable spiculated 13 mm nodule within the medial RIGHT apex.     A masslike focus of infiltrate within the RIGHT upper lobe is increased  in size measuring 47 x 36 x 22 mm compared with 47 x 21 x 14 mm.     Interstitial and reticulonodular disease throughout both lungs has  progressed as compared with 5 weeks ago.     No significant pleural fluid.  No pneumothorax.     Unchanged appearance of the upper abdomen.       Impression:      1. Stable spiculated nodule within the medial RIGHT apex.  2. Masslike focus within the base of the RIGHT upper lobe shows  increased size compared with 5 weeks ago.  2. Diffuse interstitial and reticulonodular disease within both lungs is  more prominent now as compared with 5 weeks ago.     This report was signed and finalized on 8/4/2024 2:35 PM by Dr. Danilo Sebastian MD.               I have reviewed the patient's current medications.     albuterol, 1.25 mg, Nebulization, 4x Daily - RT   And  ipratropium, 0.5 mg, Nebulization, 4x Daily - RT  aspirin, 81 mg, Oral, Daily  atorvastatin, 20 mg, Oral, Nightly  bumetanide, 0.5 mg, Oral, Daily  cetirizine, 10 mg, Oral, Daily  dronedarone, 400 mg, Oral, Q12H  fluticasone, 2 spray, Each Nare, Daily  heparin (porcine), 5,000 Units, Subcutaneous, Q8H  isosorbide mononitrate, 30 mg, Oral, Q24H  melatonin, 10 mg, Oral, Nightly  methylPREDNISolone sodium succinate, 40 mg, Intravenous, Q12H  metoprolol succinate XL, 50 mg, Oral, Daily  montelukast, 10 mg, Oral, Nightly  multivitamin with minerals, 1 tablet, Oral, Daily  pantoprazole, 40 mg, Oral, Daily  sodium bicarbonate, 650 mg, Oral, TID  sodium chloride, 10 mL, Intravenous, Q12H         Intake/Output Summary (Last 24 hours) at 8/4/2024 1530  Last data filed at 8/4/2024 0846  Gross per 24 hour   Intake 120 ml   Output 1100 ml   Net -980 ml        Assessment/Plan    Assessment  Active Hospital Problems    Diagnosis     **Acute on chronic respiratory failure with hypoxia     Chronic diastolic congestive heart failure     COPD with acute exacerbation     S/P radiation > 12 weeks     Bilateral lower extremity edema     Former smoker     Hyperkalemia     Pulmonary fibrosis     Stage 4 chronic kidney disease     Malignant neoplasm of upper lobe of right lung        Treatment Plan    1. Acute on chronic respiratory failure with hypoxia-upon admission to the emergency department patient presented with his home 2 L of oxygen that he normally wears as needed.  Patient's oxygen saturation on room air was reported as 86%.  Continue dimensions dyspnea on exertion, oxygen saturations dropped to 76% on 2 L and patient required 4 L of oxygen to maintain saturations greater than 90%.  Continue Solu-Medrol 40 mg IV every 12 continue, Zyrtec, Flonase, and Singulair. Continue nebulizers, as needed ABGs incentive spirometry, supplemental oxygen with notification for provider for any sustained usage greater than 4 L.      2.  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  Chest x-ray performed on admission revealed a known mass followed by Dr. Michele and Dr. Cool to be increasing in size in the mid lung zone of on the right.  Patient received Keytruda, chemotherapy, and radiation which concluded with radiation in February 2024.  Due to new findings oncology consultation per Dr. Rico discussed the case with Dr. Cool and ordered a CT scan of the chest to be compared with previous.  CT scan revealed stable spiculated nodule within the medial right apex.  Masslike focus within the base of the right upper lobe shows increased size compared with 5 weeks ago.  Diffuse interstitial and reticulonodular disease within both lungs is more prominent now as compared with 5 weeks ago.  To be  reviewed by oncology, continue palliative care consult, and management for acute on chronic exacerbation of fibrocystic lung disease.    2.  Bilateral lower extremity edema-patient has a history of chronic lower extremity edema on daily Lasix.  Patient initially presented with shortness of breath and 2+ pitting edema.  Continues to significantly improved, 1+ edema bilaterally, with improving erythema.  Continue p.o. Bumex and close monitoring of erythema due to history of cellulitis.    4.  Stage III chronic kidney disease-patient's initial presentation was BUN of 27 and creatinine of 2.46 with a GFR of 25.5.  Previous discharge creatinine at 2.34.  Today creatinine remained stable at 2.44 continue to review nephrotoxic agents and monitor with daily BMP.    5.  Hyperkalemia-chronic history of hyperkalemia, presented with potassium of 5.4 given Lokelma x 1.  Today potassium 5.3, additional Lokelma x 1, continue to monitor with daily BMP    6.  Chronic diastolic heart failure with preserved ejection fraction-last echocardiogram 7/3/2024 revealed ejection fraction of 60-65%.  BNP on admission was 295.  Patient has chronic complaints of bilateral lower extremity edema and shortness of breath.  Patient it is optimized on metoprolol, isosorbide, and losartan.  Continue strict intake and output, daily weights, heart failure education.    Due to patient's recent history of DVT, venous Doppler was performed without evidence of thrombus.  VTE prophylaxis with SCDs  Labs in a.m.    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     [facility] Data  External documents reviewed: None   cardiac tracing (EKG, telemetry) interpretation: None  Radiology interpretation: None  Labs reviewed:   8/4/2024  Any tests that were considered but not ordered: CTA of the chest, due to patient's CKD will leave it up to oncology     Decision rules/scores evaluated (example [id number], Wells, etc): [id number] score of 5     Discussed with: Dr. Lloyd and patient     Care Planning  Shared decision making: Dr. Lloyd and patient  Code status and discussions: Full code per patient  Surrogate Decision Maker is son Beau or brother Keshav    Disposition  Social Determinants of Health that impact treatment or disposition: Lives at home alone and may require assistance with progressing lung cancer.  Palliative care and social work consult initiated  I expect the patient to be discharged to home with home health in 1-2 days.     Electronically signed by MARITZA Johnson, 08/04/24, 15:30 CDT.

## 2024-08-04 NOTE — PLAN OF CARE
Goal Outcome Evaluation:  Plan of Care Reviewed With: patient        Progress: no change  Outcome Evaluation: OT amaury complete.  Pt A&Ox4 sitting up in chair on 3L O2 upon therapist arrival. c/o minor chest pain d/t SOA following recent mobility. He demos 4/5 B shoulder strength, all other BUE strength WFL. He was able perform distal LE dressing skills independently but it does require increased effort from him. He stood & amb community distance requiring 1 sitting RB d/t SOA. He desat to 83% and took 2 min to recover back to the 90s. Once back in the chair, discussed role of OT with pt. Although he demos decreased act guadalupe, he feels this is baseline and is aware of his limitations/how to compensate for them. OT to sign off to pt request, encouraged pt to notify medical team if he experiences further fxnal decline. Anticipate d/c home once medically stable.      Anticipated Discharge Disposition (OT): home with assist

## 2024-08-04 NOTE — PLAN OF CARE
Goal Outcome Evaluation:  Plan of Care Reviewed With: patient        Progress: no change  Outcome Evaluation: No co pain. He walked in dozier and was SOA with 3L per NC. He sit in the chair most of the day. He is on 2L per NC. No BM, still need OB stool sample. Cont Bumex and steroids. Cont to monitor.

## 2024-08-04 NOTE — THERAPY DISCHARGE NOTE
Patient Name: Karoline Prater  : 1940    MRN: [id number]                              Today's Date: 2024       Admit Date: 2024    Visit Dx:     ICD-10-CM ICD-9-CM   1. Bilateral lower extremity edema  R60.0 782.3   2. Hypoxia  R09.02 799.02   3. Hypervolemia, unspecified hypervolemia type  E87.70 276.69   4. Impaired mobility [Z74.09]  Z74.09 799.89     Patient Active Problem List   Diagnosis    Dyspnea    Essential hypertension    NSVT (nonsustained ventricular tachycardia)    Malignant neoplasm of upper lobe of right lung    Stage 4 chronic kidney disease    Pancytopenia due to antineoplastic chemotherapy    Pneumonia due to infectious organism    Function kidney decreased    Cellulitis    Acute on chronic respiratory failure with hypoxia    Pulmonary fibrosis    Macrocytic anemia    Thrombocytopenia    Sepsis    Right pneumothorax    Hyperkalemia    Acute renal failure superimposed on chronic kidney disease    GERD without esophagitis    A-fib    Former smoker    Chest pain    Tachycardia    Bilateral lower extremity edema    Metastatic adenocarcinoma of the RUL    Chronic diastolic congestive heart failure    COPD with acute exacerbation    S/P radiation > 12 weeks     Past Medical History:   Diagnosis Date    Arthritis     Atrial fibrillation with rapid ventricular response 2019    Blindness of left eye     BPH with obstruction/lower urinary tract symptoms     Cancer     stomach & lung    CHF (congestive heart failure)     CKD (chronic kidney disease) stage 3, GFR 30-59 ml/min     COPD with acute exacerbation 8/3/2024    Coronary artery disease     Elevated cholesterol     Essential hypertension 10/02/2017    Fibrotic lung diseases     GERD (gastroesophageal reflux disease)     Hearing loss     History of transfusion     Hydronephrosis of left kidney     Hyperlipidemia     Hypertension     pt was taken off of all of his medications for BP (atenolol, lisinopril, lasix) because his BP  kept bottoming out so his primary dr told him to discontinue them 1-2 months ago (Jan/Feb 2019). pt states he takes no medications currently.    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 Patel is just going to keep an eye on it and not do surgery right now.    Mediastinal adenopathy 10/24/2018    [facility]    Monoclonal gammopathy of unknown significance (MGUS) 09/11/2018    Pancreatic mass     pt states he had this in 2013 but it went away on its own. Now recent CT shows it has come back so he is going to have an ultrasound on 3/13/19.    Shortness of breath      Past Surgical History:   Procedure Laterality Date    ABDOMINAL SURGERY      BRONCHOSCOPY N/A 10/24/2018    Procedure: BRONCHOSCOPY WITH BIOPSY, EBUS;  Surgeon: Gareth Becerra MD;  Location: [facility] OR;  Service: Pulmonary    CHOLECYSTECTOMY      COLONOSCOPY N/A 1/3/2019    Procedure: COLONOSCOPY WITH ANESTHESIA;  Surgeon: Randy Somers DO;  Location: [facility];  Service: Gastroenterology    CYSTOSCOPY, RETROGRADE PYELOGRAM AND STENT INSERTION Left 3/8/2019    Procedure: CYSTOSCOPY RETROGRADE BILATERAL PYELOGRAM;  Surgeon: Jos Sylvester MD;  Location: [facility] OR;  Service: Urology    ENDOSCOPY N/A 12/11/2018    Procedure: ESOPHAGOGASTRODUODENOSCOPY WITH ANESTHESIA;  Surgeon: Randy Somers DO;  Location: [facility];  Service: Gastroenterology    ENDOSCOPY N/A 4/29/2019    Procedure: ESOPHAGOGASTRODUODENOSCOPY WITH ANESTHESIA;  Surgeon: Lilliam Jj MD;  Location: [facility] OR;  Service: Gastroenterology    ENDOSCOPY N/A 5/9/2019    Procedure: ESOPHAGOGASTRODUODENOSCOPY WITH ANESTHESIA;  Surgeon: Pilo Bansal MD;  Location: [facility];  Service: Gastroenterology    EYE SURGERY Left 1964    FOOT SURGERY Right 1966    joint    FRACTURE SURGERY        General  Information       Row Name 08/04/24 0945          Physical Therapy Time and Intention    Document Type evaluation  presented with SOA, Dyspnea on exertion. Dx; CHF, acute on chronic resp. failure. H/o PE, and pulmonary fibrosis secondary to metastatic adenocracinoma and lung cancer  -     Mode of Treatment physical therapy  -       Row Name 08/04/24 0945          General Information    Patient Profile Reviewed yes  -AJ     Prior Level of Function independent:;all household mobility;community mobility;gait;home management;ADL's  -     Existing Precautions/Restrictions fall;oxygen therapy device and L/min;other (see comments)  3L  -AJ     Barriers to Rehab medically complex  -       Row Name 08/04/24 0945          Living Environment    People in Home alone  -       Row Name 08/04/24 0945          Home Main Entrance    Number of Stairs, Main Entrance other (see comments)  ramp  -       Row Name 08/04/24 0945          Stairs Within Home, Primary    Number of Stairs, Within Home, Primary one  -AJ     Stair Railings, Within Home, Primary railings safe and in good condition  -       Row Name 08/04/24 0945          Cognition    Orientation Status (Cognition) oriented x 4  -       Row Name 08/04/24 0945          Safety Issues, Functional Mobility    Impairments Affecting Function (Mobility) endurance/activity tolerance;shortness of breath  -               User Key  (r) = Recorded By, (t) = Taken By, (c) = Cosigned By      Initials Name Provider Type    Jay Montes, PT Physical Therapist                   Mobility       Row Name 08/04/24 0945          Bed-Chair Transfer    Bed-Chair [place] (Transfers) contact guard;1 person to manage equipment  -     Assistive Device (Bed-Chair Transfers) other (see comments)  HHA as needed but only required x1 time  -       Row Name 08/04/24 0945          Sit-Stand Transfer    Sit-Stand [place] (Transfers) standby assist  -       Row Name 08/04/24 0945           Gait/Stairs (Locomotion)    [place] Level (Gait) standby assist;1 person to manage equipment;other (see comments)  o2 tank  -     Distance in Feet (Gait) 200  -     Deviations/Abnormal Patterns (Gait) bilateral deviations  -     Bilateral Gait Deviations forward flexed posture  -               User Key  (r) = Recorded By, (t) = Taken By, (c) = Cosigned By      Initials Name Provider Type     Jay Moe PT Physical Therapist                   Obj/Interventions       Row Name 08/04/24 0945          Range of Motion Comprehensive    General Range of Motion bilateral lower extremity ROM WFL  -[facility] Name 08/04/24 0945          Strength Comprehensive (MMT)    General Manual Muscle Testing (MMT) Assessment no strength deficits identified  -     Comment, General Manual Muscle Testing (MMT) Assessment grossly 4 to 4+/5 B LE and states he feels normal strength  -       Row Name 08/04/24 0945          Balance    Balance Assessment sitting static balance;sitting dynamic balance;standing static balance;standing dynamic balance  -     Static Sitting Balance independent  -     Dynamic Sitting Balance independent  -     Position, Sitting Balance supported;sitting in chair  -     Static Standing Balance standby assist  -     Dynamic Standing Balance contact guard  -     Position/Device Used, Standing Balance supported;unsupported  -     Balance Interventions sitting;standing;sit to stand;supported;static;dynamic  -       Row Name 08/04/24 0945          Sensory Assessment (Somatosensory)    Sensory Assessment (Somatosensory) LE sensation intact  -               User Key  (r) = Recorded By, (t) = Taken By, (c) = Cosigned By      Initials Name Provider Type    Jay Montes PT Physical Therapist                   Goals/Plan    No documentation.                  Clinical Impression       Row Name 08/04/24 0945          Pain    Additional Documentation Pain Scale: FACES  Pre/Post-Treatment (Group)  -       Row Name 08/04/24 0945          Pain Scale: FACES Pre/Post-Treatment    Pain: FACES Scale, Pretreatment 2-->hurts little bit  -AJ     Posttreatment Pain Rating 2-->hurts little bit  -AJ     Pain Location generalized  -     Pain Location - chest;other (see comments)  SOA causes light pain  -       Row Name 08/04/24 0945          Plan of Care Review    Plan of Care Reviewed With patient  -     Outcome Evaluation PT eval complete. Pt up in recliner upon arrival, AOx4 and minor complaints of SOA and chest pain due to just walking with [facility] staff, SpO2 sat of 96%. Pt reports independence at home with use of rollator and striaght cane as needed. Today pt demo functional AROM and strength greater than 4/5 B LE. Pt ambulated 100' followed by short rest break due to desat to 83% on 3L, which he states is normal for him. Pt recovered within 2 minutes before returning 100' again to room and left recliner. Pt states he would not like PT services to continue to see him and he feels at his normal and knows when he needs to sit and rest. SpO2 91% on 3L at completion of session. PT  to sign off at this time and RN notified. Please reconsult with change in status or need regarding this pt care. Anticipate d/c home when medically stable.  -       Row Name 08/04/24 0945          Therapy Assessment/Plan (PT)    Patient/Family Therapy Goals Statement (PT) go home and feel better  -     Criteria for Skilled Interventions Met (PT) no;does not meet criteria for skilled intervention;no problems identified which require skilled intervention  -     Therapy Frequency (PT) evaluation only  -       Row Name 08/04/24 7845          Vital Signs    Pre SpO2 (%) 96  -     O2 Delivery Pre Treatment nasal cannula  3L  -AJ     Intra SpO2 (%) --  83-89% with mobility  -     O2 Delivery Intra Treatment nasal cannula  -     Post SpO2 (%) 91  -     O2 Delivery Post Treatment nasal cannula  3L  -AJ      Pre Patient Position Sitting  -AJ     Intra Patient Position Standing  -AJ     Post Patient Position Sitting  -AJ       Row Name 08/04/24 0945          Positioning and Restraints    Pre-Treatment Position sitting in chair/recliner  -AJ     Post Treatment Position chair  -AJ     In Chair notified nsg;reclined;call light within reach;encouraged to call for assist  -AJ               User Key  (r) = Recorded By, (t) = Taken By, (c) = Cosigned By      Initials Name Provider Type    Jay Montes, PT Physical Therapist                   Outcome Measures       Row Name 08/04/24 0945 08/04/24 0835       How much help from another person do you currently need...    Turning from your back to your side while in flat bed without using bedrails? 4  -AJ 4  -AA (r) HK (t) AA (c)    Moving from lying on back to sitting on the side of a flat bed without bedrails? 4  -AJ 4  -AA (r) HK (t) AA (c)    Moving to and from a bed to a chair (including a wheelchair)? 4  -AJ 4  -AA (r) HK (t) AA (c)    Standing up from a chair using your arms (e.g., wheelchair, bedside chair)? 4  -AJ 3  -AA (r) HK (t) AA (c)    Climbing 3-5 steps with a railing? 3  -AJ 3  -AA (r) HK (t) AA (c)    To walk in hospital room? 4  -AJ 3  -AA (r) HK (t) AA (c)    AM-PAC 6 Clicks Score (PT) 23  -AJ 21  -AA (r) HK (t)    Highest Level of Mobility Goal 7 --> Walk 25 feet or more  -AJ 6 --> Walk 10 steps or more  -AA (r) HK (t)      Row Name 08/04/24 0945          Functional Assessment    Outcome Measure Options AM-PAC 6 Clicks Basic Mobility (PT)  -               User Key  (r) = Recorded By, (t) = Taken By, (c) = Cosigned By      Initials Name Provider Type    Lena Zhao, RN Registered Nurse    Melissa Roy, Nursing Student Nursing Student    Jay Montes, PT Physical Therapist                  Physical Therapy Education       Title: PT OT SLP Therapies (Done)       Topic: Physical Therapy (Done)       Point: Mobility training (Done)        Learning Progress Summary             Patient Acceptance, E, VU by BECKA at 8/4/2024 1028    Comment: role of PT, mobility safety when d/c home, use of pulse ox, role of PT, d/c plan                         Point: Home exercise program (Done)       Learning Progress Summary             Patient Acceptance, E, VU by BECKA at 8/4/2024 1028    Comment: role of PT, mobility safety when d/c home, use of pulse ox, role of PT, d/c plan                         Point: Body mechanics (Done)       Learning Progress Summary             Patient Acceptance, E, VU by BECKA at 8/4/2024 1028    Comment: role of PT, mobility safety when d/c home, use of pulse ox, role of PT, d/c plan                         Point: Precautions (Done)       Learning Progress Summary             Patient Acceptance, E, VU by BECKA at 8/4/2024 1028    Comment: role of PT, mobility safety when d/c home, use of pulse ox, role of PT, d/c plan                                         User Key       Initials Effective Dates Name Provider Type Discipline    BECKA 05/07/24 -  Jay Moe, PT Physical Therapist PT                  PT Recommendation and Plan     Plan of Care Reviewed With: patient  Outcome Evaluation: PT eval complete. Pt up in recliner upon arrival, AOx4 and minor complaints of SOA and chest pain due to just walking with nsg staff, SpO2 sat of 96%. Pt reports independence at home with use of rollator and striaght cane as needed. Today pt demo functional AROM and strength greater than 4/5 B LE. Pt ambulated 100' followed by short rest break due to desat to 83% on 3L, which he states is normal for him. Pt recovered within 2 minutes before returning 100' again to room and left recliner. Pt states he would not like PT services to continue to see him and he feels at his normal and knows when he needs to sit and rest. SpO2 91% on 3L at completion of session. PT  to sign off at this time and RN notified. Please reconsult with change in status or need regarding this pt  care. Anticipate d/c home when medically stable.     Time Calculation:         PT Charges       Row Name 08/04/24 0945             Time Calculation    Start Time 0945  -AJ      Stop Time 1010  +6 for chart review  -AJ      Time Calculation (min) 25 min  -AJ      PT Received On 08/04/24  -AJ      PT Goal Re-Cert Due Date 08/14/24  -AJ         Untimed Charges    PT Eval/Re-eval Minutes 31  -AJ         Total Minutes    Untimed Charges Total Minutes 31  -AJ       Total Minutes 31  -AJ                User Key  (r) = Recorded By, (t) = Taken By, (c) = Cosigned By      Initials Name Provider Type    AJ Jay Moe, PT Physical Therapist                  Therapy Charges for Today       Code Description Service Date Service Provider Modifiers Qty    50129914892 HC PT EVAL LOW COMPLEXITY 2 8/4/2024 Jay Moe, PT GP 1            PT G-Codes  Outcome Measure Options: AM-PAC 6 Clicks Basic Mobility (PT)  AM-PAC 6 Clicks Score (PT): 23    PT Discharge Summary  Anticipated Discharge Disposition (PT): home    Jay Moe PT  8/4/2024

## 2024-08-05 PROBLEM — I50.9 CHF (CONGESTIVE HEART FAILURE): Status: ACTIVE | Noted: 2024-08-05

## 2024-08-05 LAB
ANION GAP SERPL CALCULATED.3IONS-SCNC: 10 MMOL/L (ref 5–15)
BUN SERPL-MCNC: 36 MG/DL (ref 8–23)
BUN/CREAT SERPL: 15.1 (ref 7–25)
CALCIUM SPEC-SCNC: 10.1 MG/DL (ref 8.6–10.5)
CHLORIDE SERPL-SCNC: 104 MMOL/L (ref 98–107)
CO2 SERPL-SCNC: 25 MMOL/L (ref 22–29)
CREAT SERPL-MCNC: 2.39 MG/DL (ref 0.76–1.27)
DEPRECATED RDW RBC AUTO: 59.6 FL (ref 37–54)
EGFRCR SERPLBLD CKD-EPI 2021: 26.4 ML/MIN/1.73
ERYTHROCYTE [DISTWIDTH] IN BLOOD BY AUTOMATED COUNT: 16.5 % (ref 12.3–15.4)
GLUCOSE SERPL-MCNC: 148 MG/DL (ref 65–99)
HCT VFR BLD AUTO: 38.4 % (ref 37.5–51)
HGB BLD-MCNC: 11.7 G/DL (ref 13–17.7)
MAGNESIUM SERPL-MCNC: 2.1 MG/DL (ref 1.6–2.4)
MCH RBC QN AUTO: 29.9 PG (ref 26.6–33)
MCHC RBC AUTO-ENTMCNC: 30.5 G/DL (ref 31.5–35.7)
MCV RBC AUTO: 98.2 FL (ref 79–97)
PLATELET # BLD AUTO: 223 10*3/MM3 (ref 140–450)
PMV BLD AUTO: 11.3 FL (ref 6–12)
POTASSIUM SERPL-SCNC: 4.6 MMOL/L (ref 3.5–5.2)
POTASSIUM SERPL-SCNC: 5.3 MMOL/L (ref 3.5–5.2)
RBC # BLD AUTO: 3.91 10*6/MM3 (ref 4.14–5.8)
SODIUM SERPL-SCNC: 139 MMOL/L (ref 136–145)
WBC NRBC COR # BLD AUTO: 9.7 10*3/MM3 (ref 3.4–10.8)

## 2024-08-05 PROCEDURE — 25010000002 ENOXAPARIN PER 10 MG

## 2024-08-05 PROCEDURE — 99497 ADVNCD CARE PLAN 30 MIN: CPT | Performed by: CLINICAL NURSE SPECIALIST

## 2024-08-05 PROCEDURE — 94799 UNLISTED PULMONARY SVC/PX: CPT

## 2024-08-05 PROCEDURE — 25010000002 METHYLPREDNISOLONE PER 40 MG: Performed by: FAMILY MEDICINE

## 2024-08-05 PROCEDURE — 25010000002 METHYLPREDNISOLONE PER 40 MG

## 2024-08-05 PROCEDURE — 85027 COMPLETE CBC AUTOMATED: CPT

## 2024-08-05 PROCEDURE — 99223 1ST HOSP IP/OBS HIGH 75: CPT | Performed by: CLINICAL NURSE SPECIALIST

## 2024-08-05 PROCEDURE — 99233 SBSQ HOSP IP/OBS HIGH 50: CPT | Performed by: INTERNAL MEDICINE

## 2024-08-05 PROCEDURE — 80048 BASIC METABOLIC PNL TOTAL CA: CPT

## 2024-08-05 PROCEDURE — 83735 ASSAY OF MAGNESIUM: CPT

## 2024-08-05 PROCEDURE — 25010000002 HEPARIN (PORCINE) PER 1000 UNITS: Performed by: STUDENT IN AN ORGANIZED HEALTH CARE EDUCATION/TRAINING PROGRAM

## 2024-08-05 PROCEDURE — 84132 ASSAY OF SERUM POTASSIUM: CPT

## 2024-08-05 RX ORDER — METHYLPREDNISOLONE SODIUM SUCCINATE 40 MG/ML
40 INJECTION, POWDER, LYOPHILIZED, FOR SOLUTION INTRAMUSCULAR; INTRAVENOUS DAILY
Status: DISCONTINUED | OUTPATIENT
Start: 2024-08-06 | End: 2024-08-06

## 2024-08-05 RX ORDER — ENOXAPARIN SODIUM 100 MG/ML
30 INJECTION SUBCUTANEOUS EVERY 24 HOURS
Status: DISCONTINUED | OUTPATIENT
Start: 2024-08-05 | End: 2024-08-08 | Stop reason: HOSPADM

## 2024-08-05 RX ORDER — METHYLPREDNISOLONE SODIUM SUCCINATE 40 MG/ML
40 INJECTION, POWDER, LYOPHILIZED, FOR SOLUTION INTRAMUSCULAR; INTRAVENOUS EVERY 12 HOURS
Status: COMPLETED | OUTPATIENT
Start: 2024-08-05 | End: 2024-08-05

## 2024-08-05 RX ORDER — BUMETANIDE 1 MG/1
0.5 TABLET ORAL DAILY
Status: DISCONTINUED | OUTPATIENT
Start: 2024-08-05 | End: 2024-08-08 | Stop reason: HOSPADM

## 2024-08-05 RX ADMIN — Medication 10 MG: at 21:54

## 2024-08-05 RX ADMIN — DRONEDARONE 400 MG: 400 TABLET, FILM COATED ORAL at 08:42

## 2024-08-05 RX ADMIN — MONTELUKAST SODIUM 10 MG: 10 TABLET, FILM COATED ORAL at 21:54

## 2024-08-05 RX ADMIN — Medication 10 ML: at 08:45

## 2024-08-05 RX ADMIN — SODIUM BICARBONATE 650 MG: 650 TABLET, ORALLY DISINTEGRATING ORAL at 21:54

## 2024-08-05 RX ADMIN — METHYLPREDNISOLONE SODIUM SUCCINATE 40 MG: 40 INJECTION, POWDER, FOR SOLUTION INTRAMUSCULAR; INTRAVENOUS at 21:54

## 2024-08-05 RX ADMIN — Medication 1 TABLET: at 08:40

## 2024-08-05 RX ADMIN — ALBUTEROL SULFATE 1.25 MG: 2.5 SOLUTION RESPIRATORY (INHALATION) at 10:18

## 2024-08-05 RX ADMIN — ASPIRIN 81 MG: 81 TABLET, COATED ORAL at 08:43

## 2024-08-05 RX ADMIN — SODIUM BICARBONATE 650 MG: 650 TABLET, ORALLY DISINTEGRATING ORAL at 08:42

## 2024-08-05 RX ADMIN — ISOSORBIDE MONONITRATE 30 MG: 30 TABLET, EXTENDED RELEASE ORAL at 08:42

## 2024-08-05 RX ADMIN — CETIRIZINE HYDROCHLORIDE 10 MG: 10 TABLET ORAL at 08:42

## 2024-08-05 RX ADMIN — Medication 10 ML: at 21:57

## 2024-08-05 RX ADMIN — IPRATROPIUM BROMIDE 0.5 MG: 0.5 SOLUTION RESPIRATORY (INHALATION) at 14:30

## 2024-08-05 RX ADMIN — SODIUM ZIRCONIUM CYCLOSILICATE 10 G: 10 POWDER, FOR SUSPENSION ORAL at 10:18

## 2024-08-05 RX ADMIN — METHYLPREDNISOLONE SODIUM SUCCINATE 40 MG: 40 INJECTION, POWDER, FOR SOLUTION INTRAMUSCULAR; INTRAVENOUS at 08:44

## 2024-08-05 RX ADMIN — ALBUTEROL SULFATE 1.25 MG: 2.5 SOLUTION RESPIRATORY (INHALATION) at 14:30

## 2024-08-05 RX ADMIN — BUMETANIDE 0.5 MG: 1 TABLET ORAL at 08:41

## 2024-08-05 RX ADMIN — IPRATROPIUM BROMIDE 0.5 MG: 0.5 SOLUTION RESPIRATORY (INHALATION) at 20:54

## 2024-08-05 RX ADMIN — PANTOPRAZOLE SODIUM 40 MG: 40 TABLET, DELAYED RELEASE ORAL at 08:43

## 2024-08-05 RX ADMIN — DRONEDARONE 400 MG: 400 TABLET, FILM COATED ORAL at 21:54

## 2024-08-05 RX ADMIN — SODIUM BICARBONATE 650 MG: 650 TABLET, ORALLY DISINTEGRATING ORAL at 17:45

## 2024-08-05 RX ADMIN — ENOXAPARIN SODIUM 30 MG: 100 INJECTION SUBCUTANEOUS at 08:44

## 2024-08-05 RX ADMIN — ATORVASTATIN CALCIUM 20 MG: 10 TABLET, FILM COATED ORAL at 21:54

## 2024-08-05 RX ADMIN — METOPROLOL SUCCINATE 50 MG: 50 TABLET, EXTENDED RELEASE ORAL at 08:42

## 2024-08-05 RX ADMIN — IPRATROPIUM BROMIDE 0.5 MG: 0.5 SOLUTION RESPIRATORY (INHALATION) at 10:18

## 2024-08-05 RX ADMIN — FLUTICASONE PROPIONATE 2 SPRAY: 50 SPRAY, METERED NASAL at 08:44

## 2024-08-05 RX ADMIN — HEPARIN SODIUM 5000 UNITS: 5000 INJECTION INTRAVENOUS; SUBCUTANEOUS at 05:38

## 2024-08-05 RX ADMIN — IPRATROPIUM BROMIDE 0.5 MG: 0.5 SOLUTION RESPIRATORY (INHALATION) at 06:11

## 2024-08-05 RX ADMIN — ALBUTEROL SULFATE 1.25 MG: 2.5 SOLUTION RESPIRATORY (INHALATION) at 20:54

## 2024-08-05 RX ADMIN — ALBUTEROL SULFATE 1.25 MG: 2.5 SOLUTION RESPIRATORY (INHALATION) at 06:11

## 2024-08-05 NOTE — PROGRESS NOTES
[place] [facility] PROGRESS NOTE - [facility]    Patient: Karoline Prater  1940   MR# [id number]   Acct# [id number]  08/05/24   11:05 CDT  Referring Provider: Franny Montilla MD    Chief Complaint: Shortness of breath  Interval history: The patient is awake and alert resting in bed.  He is receiving breathing treatment.  O2 sat 97% on 3 L nasal cannula.  He has no new pulmonary complaints today.  AM labs reviewed.  No new imaging to review this morning.  Solu-Medrol will transition to 40 mg daily tomorrow.  Further orders per Dr. Crocker.  Meds:  albuterol, 1.25 mg, Nebulization, 4x Daily - RT   And  ipratropium, 0.5 mg, Nebulization, 4x Daily - RT  aspirin, 81 mg, Oral, Daily  atorvastatin, 20 mg, Oral, Nightly  bumetanide, 0.5 mg, Oral, Daily  cetirizine, 10 mg, Oral, Daily  dronedarone, 400 mg, Oral, Q12H  enoxaparin, 30 mg, Subcutaneous, Q24H  fluticasone, 2 spray, Each Nare, Daily  isosorbide mononitrate, 30 mg, Oral, Q24H  melatonin, 10 mg, Oral, Nightly  methylPREDNISolone sodium succinate, 40 mg, Intravenous, Q12H  [START ON 8/6/2024] methylPREDNISolone sodium succinate, 40 mg, Intravenous, Daily  metoprolol succinate XL, 50 mg, Oral, Daily  montelukast, 10 mg, Oral, Nightly  multivitamin with minerals, 1 tablet, Oral, Daily  pantoprazole, 40 mg, Oral, Daily  sodium bicarbonate, 650 mg, Oral, TID  sodium chloride, 10 mL, Intravenous, Q12H         Physical Exam:  SpO2 Percentage    08/05/24 0736 08/05/24 1018 08/05/24 1026   SpO2: 98% 97% 97%     Body mass index is 28.62 kg/m².   Temp:  [97.2 °F (36.2 °C)-97.8 °F (36.6 °C)] 97.2 °F (36.2 °C)  Heart Rate:  [72-85] 85  Resp:  [16-19] 16  BP: ()/(49-67) 139/62  Intake/Output Summary (Last 24 hours) at 8/5/2024 1105  Last data filed at 8/5/2024 0736  Gross per 24 hour   Intake 720 ml   Output 1150 ml   Net -430 ml     Physical Exam  Constitutional:       General: He is not in acute distress.     Appearance: He is overweight.       Interventions: Nasal cannula in place.   HENT:      Head: Normocephalic.      Nose: Nose normal.   Eyes:      General: No scleral icterus.  Cardiovascular:      Rate and Rhythm: Normal rate.   Pulmonary:      Effort: No respiratory distress.      Breath sounds: Decreased breath sounds and rales present.   Abdominal:      General: There is no distension.   Neurological:      Mental Status: He is alert. Mental status is at baseline.   Psychiatric:         Mood and Affect: Mood normal.         Behavior: Behavior is cooperative.       Electronically signed by MARITZA Pathak, 8/5/2024, 11:05 CDT      Physician substantive portion: medical decision making  Result Review  Laboratory Data:  Results from last 7 days   Lab Units 08/05/24  0249 08/04/24  0412 08/03/24  0335   WBC 10*3/mm3 9.70 6.57 5.22   HEMOGLOBIN g/dL 11.7* 11.6* 10.6*   PLATELETS 10*3/mm3 223 218 204     Results from last 7 days   Lab Units 08/05/24  1517 08/05/24  0249 08/04/24  1545 08/04/24  0412 08/03/24  0335 08/02/24  1608   SODIUM mmol/L  --  139  --  139 139  --    POTASSIUM mmol/L 4.6 5.3* 5.0 5.3* 4.7  --    CO2 mmol/L  --  25.0  --  24.0 25.0  --    BUN mg/dL  --  36*  --  33* 27*  --    CREATININE mg/dL  --  2.39*  --  2.44* 2.46*  --    LACTATE mmol/L  --   --   --   --   --  1.4         Microbiology Results (last 10 days)       Procedure Component Value - Date/Time    Respiratory Panel PCR w/COVID-19(SARS-CoV-2) JORDYN/MATILDE/BRYAN/PAD/COR/ASHER In-House, NP Swab in UTM/VTM, 2 HR TAT - Swab, Nasopharynx [[id number]]  (Normal) Collected: 08/02/24 1610    Lab Status: Final result Specimen: Swab from Nasopharynx Updated: 08/02/24 1711     ADENOVIRUS, PCR Not Detected     Coronavirus 229E Not Detected     Coronavirus HKU1 Not Detected     Coronavirus NL63 Not Detected     Coronavirus OC43 Not Detected     COVID19 Not Detected     Human Metapneumovirus Not Detected     Human Rhinovirus/Enterovirus Not Detected     Influenza A PCR Not Detected      Influenza B PCR Not Detected     Parainfluenza Virus 1 Not Detected     Parainfluenza Virus 2 Not Detected     Parainfluenza Virus 3 Not Detected     Parainfluenza Virus 4 Not Detected     RSV, PCR Not Detected     Bordetella pertussis pcr Not Detected     Bordetella parapertussis PCR Not Detected     Chlamydophila pneumoniae PCR Not Detected     Mycoplasma pneumo by PCR Not Detected    Narrative:      In the setting of a positive respiratory panel with a viral infection PLUS a negative procalcitonin without other underlying concern for bacterial infection, consider observing off antibiotics or discontinuation of antibiotics and continue supportive care. If the respiratory panel is positive for atypical bacterial infection (Bordetella pertussis, Chlamydophila pneumoniae, or Mycoplasma pneumoniae), consider antibiotic de-escalation to target atypical bacterial infection.           Recent films:  No radiology results from the last 24 hrs   Personal review of imaging : CXR shows last chest x-ray shows right-sided lung mass pulmonary fibrosis stable and chronic changes and COPD.  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  History of tobacco use  Chronic respiratory failure and hypoxia on home oxygen  Hypertension  Hyperlipidemia  Chronic congestive diastolic heart failure with preserved ejection fraction  Atrial fibrillation  Thrombocytopenia and neutropenia/anemia requiring blood transfusion  Severe hearing impairment  Chronic kidney disease stage III  Allergic rhinitis    Recommend/plan:   Patient was seen in the follow-up visit in pulmonary rounds in medical floor today  Patient appears comfortable on 2 L of oxygen and  appears afebrile and in no acute distress.  Continue Solu-Medrol and plan to change to oral prednisone tomorrow.  Continue bronchodilator treatment. On Bumex.  Renal function abnormal but appears stable  Continue incentive spirometry and flutter valve.  DVT and stress ulcer prophylaxis.  Pain and anxiety control repeat labs imaging studies from time to time.  Oncology following  Repeat labs imaging studies from time to time. Patient is already on supplemental oxygen 2.5 L at home  Nutritional support.  Physical activity as tolerated.   Palliative care consult would be a good idea.  Code status: Full.  Overall prognosis: Guarded.  We will follow and he will need long-term follow-up in the pulmonary clinic.    Time spent by me: 35 min    This visit was performed by both a physician and an [facility] RN.  I performed all aspects of the medical decision making as documented.    Electronically signed by     Tarik Crocker MD,  Pulmonologist/Intensivist   8/5/2024, 18:31 CDT

## 2024-08-05 NOTE — CONSULTS
[facility] Palliative Care Services    Palliative Care Initial Consult   Attending Physician: Franny Montilla MD  Referring Provider: Chon Rico MD    Reason for Referral: assistance with clarification of goals of care  Family/Support: brother and son    Code Status and Medical Interventions: CPR (Attempt to Resuscitate); Full Support   Ordered at: 08/02/24 1949     Level Of Support Discussed With:    Patient     Code Status (Patient has no pulse and is not breathing):    CPR (Attempt to Resuscitate)     Medical Interventions (Patient has pulse or is breathing):    Full Support     Goals of Care: TBD.    HPI:   84 y.o. 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 Michele and completed 4 cycles of combination chemotherapy with carboplatin, Keytruda, and Alimta, maintenance Keytruda and Alimta every 3 weeks with final cycle #25 on 10/29/2020, completed SBRT radiotherapy x 5 fractions for to right lung nodules 2/7/2024, Keytruda on hold pending active surveillance.  Additional health history positive for COPD, fibrotic lung disease, chronic respiratory failure-2.5 L baseline, HFpEF-EF 61 to 65% 7/3/2024, pulmonary hypertension-mild, iron deficiency anemia, arthritis, Atrial fibrillation, Blindness of left eye, BPH with obstruction/lower urinary tract symptoms, CKD, Coronary artery disease, DVT, hyperlipidemia, hypertension, GERD, Hearing loss, Hydronephrosis of left kidney, IgG kappa MGUS, smoking history, impaired mobility-uses cane/walker, and Pancreatic mass (10/29/2003) s/p resection Klaudia-en-Y procedure and a choledochojejunostomy-biopsies negative for malignancy showing a fibrosed pancreas.  Recent hospitalization 6/29-7/4 due to chest pain, tachycardia, acute renal failure, refused home health/rehab and discharged home. Patient presented to [facility] on  8/2/2024 related to this of breath and leg swelling.  ED workup shows elevated troponin, creatinine 2.44, hyperkalemia, elevated alk phos, anemia.  Chest imaging shows a mass in the upper to mid lung zone on the right larger compared to prior study measuring in the range of about 4.8 cm.  Stable pulmonary fibrosis.  Pleural thickening/effusion on right also.  Stable.  Cardiomegaly.  Received IV diuretics in the ER and [facility].  Oncology consulted recommended CT chest and pulmonary consultation.  CT chest showed stable spiculated nodule within the medial right apex.  Masslike focus within the base of the right upper lobe shows increased size compared with 5 weeks ago.  Diffuse interstitial and reticulonodular disease within both lungs is more prominent now as compared with 5 weeks ago.  Pulmonology felt presentation due to COPD exacerbation and started on Symbicort, DuoNeb, Solu-Medrol, fluticasone, and Zyrtec in addition to supplemental oxygen.  Plan for outpatient PFT and sleep study when more stable.  Patient declined further therapy treatments and anticipate discharge home as prior.  Noted to have increase in oxygen needs with exertion.  Laying in bed without apparent distress.  Feels that breathing has improved.  Without complaint. Palliative Care Spoke With: patient significant decline in quality of life and functional status over the last several weeks secondary to respiratory issues.  States most recent hospitalization as result of chest discomfort requiring hospitalization and recently placed on oxygen support. Due to the Palliative Care Topics Discussed: palliative care, goals of care, care options, resuscitation status, [facility], and discharge options we will establish an advance care plan.   Advance Care Planning   Advance Care Planning Discussion: Spoke with patient in room with regard to events leading to current hospitalization and overall poor to guarded  long-term secondary to stage IV metastatic  "adenocarcinoma of the lung, COPD exacerbation, acute on chronic respiratory failure, chronic kidney disease stage IV, pulmonary fibrosis, multiple comorbidities, and advanced age.  Discussed unfortunate progression of disease, his overall decline most recently, and likelihood of rehospitalizations given multiple comorbid factors.  Patient continues to live independently but states most recently relying on his brother for additional support.  He does have 1 son who resides in [place].  We explored previous conversations with regard to medical priorities and states his son is well acquainted with his medical wishes \"I would not want to be a vegetable.I don't want to live on machines\".  He has not completed an advance directive in the past.  As such, we explored medical priorities at length and patient has elected to de-escalate care measures to no CPR/limited support interventions, and no permanent feeding tube.  Based upon these goals we will plan to complete a MOST document.  Patient will benefit from completion of an advance directive and we will ask palliative  to follow-up for assistance with completion of documentation.  We further discussed discharge options and anticipating home as prior.  With concerns of declining performance status we further discussed potential need for nursing facility placement given limited caregiver support in the home setting and financial support for private pay caregivers. He admits that if that happens \"I would not wish to be a burden on my family\". He talks openly about his siblings that have passed away or that require 24/7 support either in a nursing facility or with family caregivers. We discussed options of best supportive care directed by hospice when rehospitalization and aggressive care interventions no longer desired. Questions encouraged and support given.      Review of Systems   Constitutional: Positive for malaise/fatigue.   Cardiovascular:  Positive for " dyspnea on exertion.   Gastrointestinal:  Negative for nausea.   Genitourinary:  Negative for dysuria.   Neurological:  Positive for weakness.   Psychiatric/Behavioral:  The patient is not nervous/anxious.      Past Medical History:   Diagnosis Date    Arthritis     Atrial fibrillation with rapid ventricular response 05/31/2019    Blindness of left eye     BPH with obstruction/lower urinary tract symptoms     Cancer     stomach & lung    CHF (congestive heart failure)     CKD (chronic kidney disease) stage 3, GFR 30-59 ml/min     COPD with acute exacerbation 8/3/2024    Coronary artery disease     Elevated cholesterol     Essential hypertension 10/02/2017    Fibrotic lung diseases     GERD (gastroesophageal reflux disease)     Hearing loss     History of transfusion     Hydronephrosis of left kidney     Hyperlipidemia     Hypertension     pt was taken off of all of his medications for BP (atenolol, lisinopril, lasix) because his BP kept bottoming out so his primary dr told him to discontinue them 1-2 months ago (Jan/Feb 2019). pt states he takes no medications currently.    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 Patel is just going to keep an eye on it and not do surgery right now.    Mediastinal adenopathy 10/24/2018    Station [facility]    Monoclonal gammopathy of unknown significance (MGUS) 09/11/2018    Pancreatic mass     pt states he had this in 2013 but it went away on its own. Now recent CT shows it has come back so he is going to have an ultrasound on 3/13/19.    Shortness of breath      Past Surgical History:   Procedure Laterality Date    ABDOMINAL SURGERY      BRONCHOSCOPY N/A 10/24/2018    Procedure: BRONCHOSCOPY WITH BIOPSY, EBUS;  Surgeon: Gareth Becerra MD;  Location: [facility] OR;  Service: Pulmonary    CHOLECYSTECTOMY      COLONOSCOPY N/A  1/3/2019    Procedure: COLONOSCOPY WITH ANESTHESIA;  Surgeon: Randy Somers DO;  Location: [facility] ENDOSCOPY;  Service: Gastroenterology    CYSTOSCOPY, RETROGRADE PYELOGRAM AND STENT INSERTION Left 3/8/2019    Procedure: CYSTOSCOPY RETROGRADE BILATERAL PYELOGRAM;  Surgeon: Jos Sylvester MD;  Location: [facility] OR;  Service: Urology    ENDOSCOPY N/A 2018    Procedure: ESOPHAGOGASTRODUODENOSCOPY WITH ANESTHESIA;  Surgeon: Randy Somers DO;  Location: [facility] ENDOSCOPY;  Service: Gastroenterology    ENDOSCOPY N/A 2019    Procedure: ESOPHAGOGASTRODUODENOSCOPY WITH ANESTHESIA;  Surgeon: Lilliam Jj MD;  Location:  [facility] OR;  Service: Gastroenterology    ENDOSCOPY N/A 2019    Procedure: ESOPHAGOGASTRODUODENOSCOPY WITH ANESTHESIA;  Surgeon: Pilo Bansal MD;  Location: [facility] ENDOSCOPY;  Service: Gastroenterology    EYE SURGERY Left 1964    FOOT SURGERY Right 1966    joint    FRACTURE SURGERY       Social History     Socioeconomic History    Marital status: Single   Tobacco Use    Smoking status: Former     Current packs/day: 0.00     Types: Cigarettes     Start date: 10/29/1954     Quit date: 10/29/2003     Years since quittin.7    Smokeless tobacco: Former     Types: Chew     Quit date:    Vaping Use    Vaping status: Never Used   Substance and Sexual Activity    Alcohol use: No    Drug use: No    Sexual activity: Defer         Current Facility-Administered Medications:     acetaminophen (TYLENOL) tablet 650 mg, 650 mg, Oral, Q4H PRN **OR** acetaminophen (TYLENOL) 160 MG/5ML oral solution 650 mg, 650 mg, Oral, Q4H PRN **OR** acetaminophen (TYLENOL) suppository 650 mg, 650 mg, Rectal, Q4H PRN, Kaylie Garcia MD    albuterol (PROVENTIL) nebulizer solution 0.5% 2.5 mg/0.5mL, 1.25 mg, Nebulization, 4x Daily - RT, 1.25 mg at 24 0611 **AND** ipratropium (ATROVENT) nebulizer solution 0.5 mg, 0.5 mg, Nebulization, 4x Daily - RT, Kaylie Garcia MD, 0.5 mg at 24 0611     aspirin EC tablet 81 mg, 81 mg, Oral, Daily, Kaylie Garcia MD, 81 mg at 08/04/24 0835    atorvastatin (LIPITOR) tablet 20 mg, 20 mg, Oral, Nightly, Kaylie Garcia MD, 20 mg at 08/04/24 2032    sennosides-docusate (PERICOLACE) 8.6-50 MG per tablet 2 tablet, 2 tablet, Oral, BID PRN **AND** polyethylene glycol (MIRALAX) packet 17 g, 17 g, Oral, Daily PRN **AND** bisacodyl (DULCOLAX) EC tablet 5 mg, 5 mg, Oral, Daily PRN **AND** bisacodyl (DULCOLAX) suppository 10 mg, 10 mg, Rectal, Daily PRN, Kaylie Garcia MD    bumetanide (BUMEX) tablet 0.5 mg, 0.5 mg, Oral, Daily, Farida Santana APRN    cetirizine (zyrTEC) tablet 10 mg, 10 mg, Oral, Daily, Tarik Crocker MD, 10 mg at 08/04/24 0835    dronedarone (MULTAQ) tablet 400 mg, 400 mg, Oral, Q12H, Kaylie Garcia MD, 400 mg at 08/04/24 2033    Enoxaparin Sodium (LOVENOX) syringe 30 mg, 30 mg, Subcutaneous, Q24H, Farida Santana APRN    fluticasone (FLONASE) 50 MCG/ACT nasal spray 2 spray, 2 spray, Each Nare, Daily, Tarik Crocker MD, 2 spray at 08/04/24 0835    isosorbide mononitrate (IMDUR) 24 hr tablet 30 mg, 30 mg, Oral, Q24H, Kaylie Garcia MD, 30 mg at 08/04/24 0836    melatonin tablet 10 mg, 10 mg, Oral, Nightly, Kaylie Garcia MD, 10 mg at 08/04/24 2032    methylPREDNISolone sodium succinate (SOLU-Medrol) injection 40 mg, 40 mg, Intravenous, Q12H, Zachary Lloyd MD, 40 mg at 08/04/24 2032    metoprolol succinate XL (TOPROL-XL) 24 hr tablet 50 mg, 50 mg, Oral, Daily, Kaylie Garcia MD, 50 mg at 08/04/24 0836    montelukast (SINGULAIR) tablet 10 mg, 10 mg, Oral, Nightly, Kaylie Garcia MD, 10 mg at 08/04/24 2032    multivitamin with minerals 1 tablet, 1 tablet, Oral, Daily, Kaylie Garcia MD, 1 tablet at 08/04/24 0836    pantoprazole (PROTONIX) EC tablet 40 mg, 40 mg, Oral, Daily, Kaylie Garcia MD, 40 mg at 08/04/24 0843    sodium bicarbonate tablet 650 mg, 650 mg, Oral, TID, Kaylie Garcia MD, 650 mg at 08/04/24 2032    sodium chloride 0.9 %  "flush 10 mL, 10 mL, Intravenous, Q12H, Kaylie Garcia MD, 10 mL at 08/04/24 2033    sodium chloride 0.9 % flush 10 mL, 10 mL, Intravenous, PRN, Kaylie Garcia MD    sodium chloride 0.9 % infusion 40 mL, 40 mL, Intravenous, PRN, Kaylie Garcia MD    sodium zirconium cyclosilicate (LOKELMA) packet 10 g, 10 g, Oral, Once, , MARITZA Iqbal    Allergies   Allergen Reactions    Penicillins Hives     I have utilized all available immediate resources to obtain, update, or review the patient's current medications (including all prescriptions, over-the-counter products, herbals, cannabis/cannabidiol products, and vitamin/mineral/dietary (nutritional) supplements) for name, route of administration, type, dose and frequency.      Intake/Output Summary (Last 24 hours) at 8/5/2024 0737  Last data filed at 8/5/2024 0451  Gross per 24 hour   Intake 960 ml   Output 1150 ml   Net -190 ml       Physical Exam:    Diagnostics: Reviewed  /67 (BP Location: Left arm, Patient Position: Lying)   Pulse 76   Temp 97.5 °F (36.4 °C) (Oral)   Resp 16   Ht 162.6 cm (64.02\")   Wt 75.7 kg (166 lb 12.8 oz)   SpO2 98%   BMI 28.62 kg/m²     Vitals and nursing note reviewed.   Constitutional:       Appearance: Not in distress. Chronically ill-appearing.      Interventions: Nasal cannula in place.   Eyes:      Comments: Left eye blindness   HENT:      Head: Normocephalic.   Pulmonary:      Effort: Pulmonary effort is normal.   Cardiovascular:      Normal rate.   Musculoskeletal:      Cervical back: Neck supple. Genitourinary:     Comments: No reported dysuria  Neurological:      Mental Status: Alert, oriented to person, place, and time and oriented to person, place and time.   Psychiatric:         Mood and Affect: Mood normal.         Cognition and Memory: Cognition normal.     Patient status: Disease state: Controlled with current treatments.  Current Functional status: Palliative Performance Scale Score: Performance 60% based on " the following measures: Ambulation: Reduced, Activity and Evidence of Disease: Unable to do hobby or some work, significant evidence of disease, Self-Care: Occasional assist necessary,  Intake: Normal or reduced, LOC: Full or confusion   Baseline Functional status: Palliative Performance Scale Score: Performance 60% based on the following measures: Ambulation: Reduced, Activity and Evidence of Disease: Unable to do hobby or some work, significant evidence of disease, Self-Care: Occasional assist necessary,  Intake: Normal or reduced, LOC: Full or confusion   Baseline ECOG Status(3) Capable of limited self-care, confined to bed or chair > 50% of waking hours.  Nutritional status: Albumin 3.5 Body mass index is 28.62 kg/m².         Hospital Problem List      Acute on chronic respiratory failure with hypoxia    Malignant neoplasm of upper lobe of right lung    Stage 4 chronic kidney disease    Pulmonary fibrosis    Hyperkalemia    Former smoker    Bilateral lower extremity edema    Chronic diastolic congestive heart failure    COPD with acute exacerbation    S/P radiation > 12 weeks    Impression/Problem List:    Right upper lobe of lung adenocarcinoma 11/27/2018 followed by Dr. Michele  COPD with acute exacerbation  Acute on chronic respiratory failure with hypoxia, 2.5 L baseline  Chronic kidney disease stage IV  Pulmonary fibrosis, extensive per pulmonology possible radiation fibrosis  IgG kappa MGUS  History of Pancreatic mass (10/29/2003) s/p resection Klaudia-en-Y procedure and a choledochojejunostomy-biopsies negative for malignancy showing a fibrosed pancreas    Former smoker  Hyperkalemia  Bilateral lower extremity edema  HFpEF-EF 61 to 65% 7/3/2024,  Pulmonary hypertension-mild  Iron deficiency anemia  Atrial fibrillation  Blindness of left eye  BPH   Coronary artery disease  DVT  Hyperlipidemia  Hypertension  GERD  Hearing loss  Smoking history  Impaired mobility-uses cane/walker   Advanced  age    Recommendations/Plan:  1. plan: Goals of care include CODE STATUS no CPR/limited support interventions.    Family support: The patient receives support from his extended family..  Advance Directives: Advance Directive Status: Patient does not have advance directive   POA/Healthcare surrogate-sonBeau-next of kin.    2.  Palliative care encounter  - Prognosis is poor to guarded poor to guarded long-term prognosis secondary to stage IV metastatic adenocarcinoma of the lung, COPD exacerbation, acute on chronic respiratory failure, chronic kidney disease stage IV, pulmonary fibrosis, multiple comorbidities, and advanced age.   -Patient appears to have fair prognostic awareness.     -completed 4 cycles of combination chemotherapy with carboplatin, Keytruda, and Alimta, maintenance Keytruda and Alimta every 3 weeks with final cycle #25 on 10/29/2020, completed SBRT radiotherapy x 5 fractions for to right lung nodules 2/7/2024, Keytruda on hold pending active surveillance.      -Received IV diuretics in the ER and [facility].    -Oncology consulted recommended CT chest and pulmonary consultation.    -Pulmonology felt presentation due to COPD exacerbation and started on Symbicort, DuoNeb, Solu-Medrol, fluticasone, and Zyrtec in addition to supplemental oxygen.  Plan for outpatient PFT and sleep study when more stable.   -Patient declined further therapy treatments and anticipate discharge home as prior.  Noted to have increase in oxygen needs with exertion, plan walking oximetry in a.m.      8/5-CODE STATUS changes no CPR/limited support interventions, no intubation, no permanent feeding tube.  -Will plan to complete a MOST document  -Would benefit from completion of advanced directive.  Palliative  to assist with completion of documentation as above.  -High risk for rehospitalization's.  Discussed progression of disease and best supportive care directed by hospice when rehospitalizations and aggressive  care interventions no longer desired.  -Anticipating home as prior.      Thank you for this consult and allowing us to participate in patient's plan of care. Palliative Care Team will continue to follow patient.     30 minutes spent on advance care planning-discussing with patient and/or family, answering questions, providing some guidance about a plan and documentation of care, and coordinating care face to face.    Grace Aly, MARITZA  8/5/2024  07:37 CDT

## 2024-08-05 NOTE — CASE MANAGEMENT/SOCIAL WORK
Discharge Planning Assessment  [facility]     Patient Name: Karoline Prater  MRN: [id number]  Today's Date: 8/5/2024    Admit Date: 8/2/2024        Discharge Needs Assessment       Row Name 08/05/24 1442       Living Environment    People in Home alone    Current Living Arrangements home    Potentially Unsafe Housing Conditions none    In the past 12 months has the electric, gas, oil, or water company threatened to shut off services in your home? No    Primary Care Provided by self    Provides Primary Care For no one    Family Caregiver if Needed friend(s);sibling(s)    Family Caregiver Names brother and neice    Quality of Family Relationships helpful;involved;supportive    Able to Return to Prior Arrangements yes       Resource/Environmental Concerns    Resource/Environmental Concerns none    Transportation Concerns none       Transportation Needs    In the past 12 months, has lack of transportation kept you from medical appointments or from getting medications? no    In the past 12 months, has lack of transportation kept you from meetings, work, or from getting things needed for daily living? No       Food Insecurity    Within the past 12 months, you worried that your food would run out before you got the money to buy more. Never true    Within the past 12 months, the food you bought just didn't last and you didn't have money to get more. Never true       Transition Planning    Patient/Family Anticipates Transition to home    Patient/Family Anticipated Services at Transition none    Transportation Anticipated car, drives self;family or friend will provide       Discharge Needs Assessment    Equipment Currently Used at Home oxygen;cane, straight;walker, rolling    Concerns to be Addressed no discharge needs identified    Anticipated Changes Related to Illness none    Equipment Needed After Discharge none    Provided Post Acute Provider List? N/A    Provided Post Acute Provider Quality & Resource List? N/A     Discharge Coordination/Progress Lives at home alone but his brother lives nearby and can assist when needed. Drives self and brother can also help out if need be.Has walker cane and O2 at home. No needs identified at this time.                   Discharge Plan    No documentation.                 Continued Care and Services - Admitted Since 8/2/2024    No active coordination exists for this encounter.          Demographic Summary    No documentation.                  Functional Status    No documentation.                  Psychosocial    No documentation.                  Abuse/Neglect    No documentation.                  Legal    No documentation.                  Substance Abuse    No documentation.                  Patient Forms    No documentation.                     Pilo Griffith RN

## 2024-08-05 NOTE — PLAN OF CARE
Goal Outcome Evaluation:  Plan of Care Reviewed With: patient           Outcome Evaluation: Pt alert and oriented x 4. Pleasant and cooperative with all cares. Pt was able to assist in shower. Is very insistent about his independence. Pt does get SOB with activity. O2 sats 96-98% on 2L n/c while in bed but dropps to mid 80's with minimal exertion on 4L n/c. Recovers quickly when resting. No c/o pain. Will continue to monitor.

## 2024-08-05 NOTE — PROGRESS NOTES
Patient Name: Karoline Prater  Date of Admission: 8/2/2024  Today's Date: 08/05/24  Length of Stay: 0  Primary Care Physician: Irving Alexis MD    Subjective   Chief Complaint: Increasing shortness of breath  Shortness of Breath  Associated symptoms include leg swelling and wheezing. Pertinent negatives include no abdominal pain, chest pain or rhinorrhea.   Leg Swelling  Associated symptoms include coughing, fatigue and weakness. Pertinent negatives include no abdominal pain, chest pain, congestion or nausea.      HPI:  Vianey Salcido 84-year-old white male with past medical history of GERD, blindness of his left eye, hypertension, BPH, pancreatic cancer with metastasis to right lung, CHF, last EF was 61-65% with left ventricular diastolic dysfunction and mild pulmonary hypertension on 7/3/2024, CKD stage III, and fibrotic lung disease.  Patient presented to [facility] emergency department on 8/2/2024 with complaints of shortness of breath requiring continuous use of his as needed oxygenation with associated leg swelling.  Patient states his shortness of breath is worse with exertion, denies any chest pain, nausea, vomiting, loss of consciousness, fever or change in his urinary or bowel habits.  On admission patient's blood pressure was soft, other vital signs were stable.  Patient received IV Lasix 20 mg and states he is passing urine.  ED lab results troponin x 2 mildly elevated, potassium of 5.4, , proBNP of 295.5, BUN of 30, creatinine of 2.44, CBC unremarkable, respiratory panel negative and chest x-ray mass in the upper midlung zone on the right larger compared to prior study measuring the range of about 4.8 cm, stable pulmonary fibrosis with cardiomegaly.  Patient was admitted for continued observation and treatment    Today:  Today patient resting comfortably in bed on 2 L of oxygen with oxygen saturation at 96%.  No family at bedside.  Is alert and oriented and able to participate in  assessment .  Patient states he is feeling much better, he stated he had much less exertional dyspnea with physical therapy yesterday and was able to tolerate sitting up in the chair all day.  His remaining course however improving.  Discussed plan of care with patient to include decreasing Solu-Medrol, continued ambulation, and walking oximetry in the morning.  Patient was in agreement that if he continues to feel this good he would like to be discharged in the morning.  Patient states he feels that he does well at home, his brother lives nearby and that he has all DME required at home.  Walking oximetry will be performed in the morning to change his current as needed dosing.  Palliative care discussion pending.  All patient's questions were answered to the best my ability and he continues to be in agreement with palliative care consult, pulmonary and oncology consultation.    TREATMENT SUMMARY  Keytruda, carboplatin, Alimta initiated 4/18/2019 to be given every 3 weeks for 4 cycles - 4th cycle 7/5/2019, then Keytruda + Alimta as maintenance to continue indefinitely until progression or intolerable side effects   Fahad received cycle #25 of Keytruda/Alimta on 10/29/2020. He was then placed on an indefinite chemotherapy holiday on 12/30/2020  SBRT by Dr. Danny Cool was delivered in 5 treatment fractions for a total dose of 5000 cGy. XRT was initiated 1/24/2024 and was completed on 2/7/2024 08/04/24  0455 08/05/24  0451   Weight: 75.7 kg (166 lb 12.8 oz) 75.7 kg (166 lb 12.8 oz)        Review of Systems   Constitutional:  Positive for fatigue.   HENT:  Negative for congestion, rhinorrhea and trouble swallowing.    Eyes: Negative.    Respiratory:  Positive for cough, shortness of breath and wheezing.    Cardiovascular:  Positive for leg swelling. Negative for chest pain.   Gastrointestinal:  Negative for abdominal distention, abdominal pain, diarrhea and nausea.   Genitourinary:  Negative for difficulty  urinating and flank pain.   Musculoskeletal: Negative.    Skin:  Positive for pallor.   Allergic/Immunologic: Negative.    Neurological:  Positive for weakness. Negative for dizziness and syncope.   Hematological: Negative.    Psychiatric/Behavioral: Negative.        All pertinent negatives and positives are as above. All other systems have been reviewed and are negative unless otherwise stated.     Objective    Temp:  [97.2 °F (36.2 °C)-97.8 °F (36.6 °C)] 97.2 °F (36.2 °C)  Heart Rate:  [72-84] 80  Resp:  [16-19] 18  BP: ()/(49-67) 139/62  Physical Exam  Vitals and nursing note reviewed.   Constitutional:       Appearance: He is ill-appearing.      Comments: Patient is resting comfortably in bed on 2.5 L with oxygen saturation at 92%.  No family at bedside   HENT:      Head: Normocephalic.      Right Ear: Tympanic membrane normal.      Left Ear: Tympanic membrane normal.      Nose: Nose normal.      Mouth/Throat:      Mouth: Mucous membranes are moist.      Pharynx: Oropharynx is clear.   Eyes:      Pupils: Pupils are equal, round, and reactive to light.   Cardiovascular:      Rate and Rhythm: Normal rate and regular rhythm.      Pulses: Normal pulses.      Heart sounds: Normal heart sounds.   Pulmonary:      Effort: Accessory muscle usage present. No tachypnea or respiratory distress.      Breath sounds: Examination of the left-upper field reveals wheezing. Examination of the left-middle field reveals wheezing. Examination of the right-lower field reveals wheezing. Wheezing and rhonchi present.      Comments: Significant exertional dyspnea  Chest:      Chest wall: No tenderness.   Abdominal:      General: Abdomen is flat.      Palpations: Abdomen is soft.   Genitourinary:     Comments: Voiding per urinal  Musculoskeletal:      Cervical back: Normal range of motion.      Right lower leg: No edema.      Left lower leg: Edema present.      Comments: Generalized weakness   Skin:     General: Skin is warm.       "Capillary Refill: Capillary refill takes less than 2 seconds.      Coloration: Skin is pale.   Neurological:      General: No focal deficit present.      Mental Status: He is alert and oriented to person, place, and time.   Psychiatric:         Mood and Affect: Mood normal.         Behavior: Behavior normal.         Thought Content: Thought content normal.         Judgment: Judgment normal.         Results Review:  I have reviewed the labs, radiology results, and diagnostic studies.    Laboratory Data:   Results from last 7 days   Lab Units 08/05/24  0249 08/04/24 0412 08/03/24  0335   WBC 10*3/mm3 9.70 6.57 5.22   HEMOGLOBIN g/dL 11.7* 11.6* 10.6*   HEMATOCRIT % 38.4 37.3* 35.4*   PLATELETS 10*3/mm3 223 218 204        Results from last 7 days   Lab Units 08/05/24  0249 08/04/24  1545 08/04/24 0412 08/03/24  0335 08/02/24  1604   SODIUM mmol/L 139  --  139 139 139   POTASSIUM mmol/L 5.3* 5.0 5.3* 4.7 5.4*   CHLORIDE mmol/L 104  --  104 107 105   CO2 mmol/L 25.0  --  24.0 25.0 23.0   BUN mg/dL 36*  --  33* 27* 30*   CREATININE mg/dL 2.39*  --  2.44* 2.46* 2.44*   CALCIUM mg/dL 10.1  --  10.2 9.3 9.6   BILIRUBIN mg/dL  --   --   --   --  0.4   ALK PHOS U/L  --   --   --   --  132*   ALT (SGPT) U/L  --   --   --   --  26   AST (SGOT) U/L  --   --   --   --  42*   GLUCOSE mg/dL 148*  --  170* 86 126*       Culture Data:   No results found for: \"BLOODCX\", \"URINECX\", \"WOUNDCX\", \"MRSACX\", \"RESPCX\", \"STOOLCX\"    Microbiology Results (last 10 days)       Procedure Component Value - Date/Time    Respiratory Panel PCR w/COVID-19(SARS-CoV-2) JORDYN/MATILDE/BRYAN/PAD/COR/ASHER In-House, NP Swab in UTM/VTM, 2 HR TAT - Swab, Nasopharynx [[id number]]  (Normal) Collected: 08/02/24 1610    Lab Status: Final result Specimen: Swab from Nasopharynx Updated: 08/02/24 1711     ADENOVIRUS, PCR Not Detected     Coronavirus 229E Not Detected     Coronavirus HKU1 Not Detected     Coronavirus NL63 Not Detected     Coronavirus OC43 Not Detected     " COVID19 Not Detected     Human Metapneumovirus Not Detected     Human Rhinovirus/Enterovirus Not Detected     Influenza A PCR Not Detected     Influenza B PCR Not Detected     Parainfluenza Virus 1 Not Detected     Parainfluenza Virus 2 Not Detected     Parainfluenza Virus 3 Not Detected     Parainfluenza Virus 4 Not Detected     RSV, PCR Not Detected     Bordetella pertussis pcr Not Detected     Bordetella parapertussis PCR Not Detected     Chlamydophila pneumoniae PCR Not Detected     Mycoplasma pneumo by PCR Not Detected    Narrative:      In the setting of a positive respiratory panel with a viral infection PLUS a negative procalcitonin without other underlying concern for bacterial infection, consider observing off antibiotics or discontinuation of antibiotics and continue supportive care. If the respiratory panel is positive for atypical bacterial infection (Bordetella pertussis, Chlamydophila pneumoniae, or Mycoplasma pneumoniae), consider antibiotic de-escalation to target atypical bacterial infection.             Radiology Data:   Imaging Results (Last 24 Hours)       Procedure Component Value Units Date/Time    CT Chest Without Contrast Diagnostic [[id number]] Collected: 08/04/24 1431     Updated: 08/04/24 1438    Narrative:      EXAMINATION: CT CHEST WO CONTRAST DIAGNOSTIC-      8/3/2024 11:08 AM     HISTORY: Lung cancer -worsening dyspena-Please compare to prior Ct chest  06/29/24 to assess progressive disease; R60.0-Localized edema;  R09.02-Hypoxemia; E87.70-Fluid overload, unspecified     In order to have a CT radiation dose as low as reasonably achievable  Automated Exposure Control was utilized for adjustment of the mA and/or  KV according to patient size.     CT Dose DLP = 126.65 mGy.cm.  (If there are multiple studies performed at the same time this  represents the total dose).     Noncontrast chest CT.  Axial, sagittal, and coronal sequences.     COMPARISON:  6/29/2024.     Cardiomegaly.  Coronary  artery calcification.  No mediastinal mass.     Relatively stable spiculated 13 mm nodule within the medial RIGHT apex.     A masslike focus of infiltrate within the RIGHT upper lobe is increased  in size measuring 47 x 36 x 22 mm compared with 47 x 21 x 14 mm.     Interstitial and reticulonodular disease throughout both lungs has  progressed as compared with 5 weeks ago.     No significant pleural fluid.  No pneumothorax.     Unchanged appearance of the upper abdomen.       Impression:      1. Stable spiculated nodule within the medial RIGHT apex.  2. Masslike focus within the base of the RIGHT upper lobe shows  increased size compared with 5 weeks ago.  2. Diffuse interstitial and reticulonodular disease within both lungs is  more prominent now as compared with 5 weeks ago.     This report was signed and finalized on 8/4/2024 2:35 PM by Dr. Danilo Sebastian MD.               I have reviewed the patient's current medications.     albuterol, 1.25 mg, Nebulization, 4x Daily - RT   And  ipratropium, 0.5 mg, Nebulization, 4x Daily - RT  aspirin, 81 mg, Oral, Daily  atorvastatin, 20 mg, Oral, Nightly  bumetanide, 0.5 mg, Oral, Daily  cetirizine, 10 mg, Oral, Daily  dronedarone, 400 mg, Oral, Q12H  enoxaparin, 30 mg, Subcutaneous, Q24H  fluticasone, 2 spray, Each Nare, Daily  isosorbide mononitrate, 30 mg, Oral, Q24H  melatonin, 10 mg, Oral, Nightly  methylPREDNISolone sodium succinate, 40 mg, Intravenous, Q12H  [START ON 8/6/2024] methylPREDNISolone sodium succinate, 40 mg, Intravenous, Daily  metoprolol succinate XL, 50 mg, Oral, Daily  montelukast, 10 mg, Oral, Nightly  multivitamin with minerals, 1 tablet, Oral, Daily  pantoprazole, 40 mg, Oral, Daily  sodium bicarbonate, 650 mg, Oral, TID  sodium chloride, 10 mL, Intravenous, Q12H  sodium zirconium cyclosilicate, 10 g, Oral, Once         Intake/Output Summary (Last 24 hours) at 8/5/2024 0804  Last data filed at 8/5/2024 0736  Gross per 24 hour   Intake 960 ml   Output  1150 ml   Net -190 ml        Assessment/Plan   Assessment  Active Hospital Problems    Diagnosis     **Acute on chronic respiratory failure with hypoxia     CHF (congestive heart failure)     Chronic diastolic congestive heart failure     COPD with acute exacerbation     S/P radiation > 12 weeks     Bilateral lower extremity edema     Former smoker     Hyperkalemia     Pulmonary fibrosis     Stage 4 chronic kidney disease     Malignant neoplasm of upper lobe of right lung        Treatment Plan    1. Acute on chronic respiratory failure with hypoxia-upon admission to the emergency department patient presented with his home 2 L of oxygen that he normally wears as needed.  Patient's oxygen saturation on room air was reported as 86%.  Continue dimensions dyspnea on exertion, oxygen saturations dropped to 76% on 2 L and patient required 4 L of oxygen to maintain saturations greater than 90%.  Continue Solu-Medrol 40 mg IV every 12 continue x 1 more dose, adjusted to daily in the a.m. Zyrtec, Flonase, and Singulair. Continue nebulizers, as needed ABGs incentive spirometry, supplemental oxygen with notification for provider for any sustained usage greater than 4 L.  Walking oximetry to be performed in the am for possible dc.    2.  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  Chest x-ray performed on admission revealed a known mass followed by Dr. Michele and Dr. Cool to be increasing in size in the mid lung zone of on the right.  Patient received Keytruda, chemotherapy, and radiation which concluded with radiation in February 2024.  Due to new findings oncology consultation per Dr. Rico discussed the case with Dr. Cool and ordered a CT scan of the chest to be compared with previous.  CT scan revealed stable spiculated nodule within the medial right apex.  Masslike focus within the base of the right  upper lobe shows increased size compared with 5 weeks ago.  Diffuse interstitial and reticulonodular disease within both lungs is more prominent now as compared with 5 weeks ago.  To be reviewed by oncology, palliative care consult pending, and management for acute on chronic exacerbation of fibrocystic lung disease.    2.  Bilateral lower extremity edema-patient has a history of chronic lower extremity edema on daily Lasix.  Patient initially presented with shortness of breath and 2+ pitting edema.  Continues to significantly improved, no edema present with improving erythema.  Continue p.o. Bumex and close monitoring of erythema due to history of cellulitis.    4.  Stage III chronic kidney disease-patient's initial presentation was BUN of 27 and creatinine of 2.46 with a GFR of 25.5.  Previous discharge creatinine at 2.34.  Today creatinine remained stable at 2.39 continue to review nephrotoxic agents and monitor with daily BMP.    5.  Hyperkalemia-chronic history of hyperkalemia, presented with potassium of 5.4 given Lokelma x 1.  Today potassium 5.3 again, additional Lokelma x 1, discontinued heparin and initiated low-dose Lovenox ,continue to monitor with daily BMP    6.  Chronic diastolic heart failure with preserved ejection fraction-last echocardiogram 7/3/2024 revealed ejection fraction of 60-65%.  BNP on admission was 295.  Patient has chronic complaints of bilateral lower extremity edema and shortness of breath.  Patient it is optimized on metoprolol, isosorbide, and losartan.  Continue strict intake and output, daily weights, heart failure education.    Due to patient's recent history of DVT, venous Doppler was performed without evidence of thrombus.  VTE prophylaxis Lovenox   Labs in a.m.    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     [facility] Data  External documents reviewed: None   cardiac tracing (EKG, telemetry) interpretation: None  Radiology interpretation: None  Labs reviewed:   8/5/2024 CBC, BMP reviewed repeat in a.m.  Any tests that were considered but not ordered: CTA of the chest, due to patient's CKD will leave it up to oncology     Decision rules/scores evaluated (example [id number], Wells, etc): [id number] score of 5     Discussed with: Dr. Montilla, Dr. Crocker and patient     Care Planning  Shared decision making: Dr. Montilla, Dr. Crocker code status and discussions: Full code per patient  Surrogate Decision Maker is son Beau or brother Keshav    Disposition  Social Determinants of Health that impact treatment or disposition: Lives at home alone and may require assistance with progressing lung cancer.  Palliative care and social work consult initiated  I expect the patient to be discharged to home with home health in 1 days.     Electronically signed by MARITZA Johnson, 08/05/24, 08:57 CDT.

## 2024-08-06 PROBLEM — N18.32 STAGE 3B CHRONIC KIDNEY DISEASE: Status: ACTIVE | Noted: 2019-04-27

## 2024-08-06 PROBLEM — Z92.3 HISTORY OF RADIATION THERAPY: Status: ACTIVE | Noted: 2024-08-06

## 2024-08-06 LAB
ANION GAP SERPL CALCULATED.3IONS-SCNC: 8 MMOL/L (ref 5–15)
BUN SERPL-MCNC: 39 MG/DL (ref 8–23)
BUN/CREAT SERPL: 17.8 (ref 7–25)
CALCIUM SPEC-SCNC: 10.2 MG/DL (ref 8.6–10.5)
CHLORIDE SERPL-SCNC: 104 MMOL/L (ref 98–107)
CO2 SERPL-SCNC: 29 MMOL/L (ref 22–29)
CREAT SERPL-MCNC: 2.19 MG/DL (ref 0.76–1.27)
DEPRECATED RDW RBC AUTO: 60.8 FL (ref 37–54)
EGFRCR SERPLBLD CKD-EPI 2021: 29.3 ML/MIN/1.73
ERYTHROCYTE [DISTWIDTH] IN BLOOD BY AUTOMATED COUNT: 16.5 % (ref 12.3–15.4)
GLUCOSE SERPL-MCNC: 152 MG/DL (ref 65–99)
HCT VFR BLD AUTO: 38.7 % (ref 37.5–51)
HGB BLD-MCNC: 11.8 G/DL (ref 13–17.7)
MCH RBC QN AUTO: 30.3 PG (ref 26.6–33)
MCHC RBC AUTO-ENTMCNC: 30.5 G/DL (ref 31.5–35.7)
MCV RBC AUTO: 99.2 FL (ref 79–97)
PLATELET # BLD AUTO: 215 10*3/MM3 (ref 140–450)
PMV BLD AUTO: 11.6 FL (ref 6–12)
POTASSIUM SERPL-SCNC: 4.9 MMOL/L (ref 3.5–5.2)
RBC # BLD AUTO: 3.9 10*6/MM3 (ref 4.14–5.8)
SODIUM SERPL-SCNC: 141 MMOL/L (ref 136–145)
WBC NRBC COR # BLD AUTO: 8.67 10*3/MM3 (ref 3.4–10.8)

## 2024-08-06 PROCEDURE — 94664 DEMO&/EVAL PT USE INHALER: CPT

## 2024-08-06 PROCEDURE — 80048 BASIC METABOLIC PNL TOTAL CA: CPT

## 2024-08-06 PROCEDURE — 25010000002 METHYLPREDNISOLONE PER 40 MG

## 2024-08-06 PROCEDURE — 94618 PULMONARY STRESS TESTING: CPT

## 2024-08-06 PROCEDURE — 94799 UNLISTED PULMONARY SVC/PX: CPT

## 2024-08-06 PROCEDURE — 85027 COMPLETE CBC AUTOMATED: CPT

## 2024-08-06 PROCEDURE — 25010000002 ENOXAPARIN PER 10 MG

## 2024-08-06 PROCEDURE — 99232 SBSQ HOSP IP/OBS MODERATE 35: CPT | Performed by: CLINICAL NURSE SPECIALIST

## 2024-08-06 PROCEDURE — 94761 N-INVAS EAR/PLS OXIMETRY MLT: CPT

## 2024-08-06 PROCEDURE — 99233 SBSQ HOSP IP/OBS HIGH 50: CPT | Performed by: INTERNAL MEDICINE

## 2024-08-06 RX ORDER — PREDNISONE 20 MG/1
40 TABLET ORAL DAILY
Status: DISCONTINUED | OUTPATIENT
Start: 2024-08-07 | End: 2024-08-08 | Stop reason: HOSPADM

## 2024-08-06 RX ADMIN — DRONEDARONE 400 MG: 400 TABLET, FILM COATED ORAL at 22:16

## 2024-08-06 RX ADMIN — DRONEDARONE 400 MG: 400 TABLET, FILM COATED ORAL at 08:58

## 2024-08-06 RX ADMIN — IPRATROPIUM BROMIDE 0.5 MG: 0.5 SOLUTION RESPIRATORY (INHALATION) at 20:32

## 2024-08-06 RX ADMIN — ALBUTEROL SULFATE 1.25 MG: 2.5 SOLUTION RESPIRATORY (INHALATION) at 10:30

## 2024-08-06 RX ADMIN — ATORVASTATIN CALCIUM 20 MG: 10 TABLET, FILM COATED ORAL at 22:16

## 2024-08-06 RX ADMIN — Medication 10 MG: at 22:16

## 2024-08-06 RX ADMIN — METHYLPREDNISOLONE SODIUM SUCCINATE 40 MG: 40 INJECTION, POWDER, FOR SOLUTION INTRAMUSCULAR; INTRAVENOUS at 09:00

## 2024-08-06 RX ADMIN — METOPROLOL SUCCINATE 50 MG: 50 TABLET, EXTENDED RELEASE ORAL at 08:59

## 2024-08-06 RX ADMIN — Medication 10 ML: at 12:10

## 2024-08-06 RX ADMIN — IPRATROPIUM BROMIDE 0.5 MG: 0.5 SOLUTION RESPIRATORY (INHALATION) at 05:49

## 2024-08-06 RX ADMIN — CETIRIZINE HYDROCHLORIDE 10 MG: 10 TABLET ORAL at 08:58

## 2024-08-06 RX ADMIN — MONTELUKAST SODIUM 10 MG: 10 TABLET, FILM COATED ORAL at 22:16

## 2024-08-06 RX ADMIN — Medication 1 TABLET: at 09:00

## 2024-08-06 RX ADMIN — ALBUTEROL SULFATE 1.25 MG: 2.5 SOLUTION RESPIRATORY (INHALATION) at 14:27

## 2024-08-06 RX ADMIN — ISOSORBIDE MONONITRATE 30 MG: 30 TABLET, EXTENDED RELEASE ORAL at 08:59

## 2024-08-06 RX ADMIN — PANTOPRAZOLE SODIUM 40 MG: 40 TABLET, DELAYED RELEASE ORAL at 09:00

## 2024-08-06 RX ADMIN — IPRATROPIUM BROMIDE 0.5 MG: 0.5 SOLUTION RESPIRATORY (INHALATION) at 10:30

## 2024-08-06 RX ADMIN — ENOXAPARIN SODIUM 30 MG: 100 INJECTION SUBCUTANEOUS at 09:00

## 2024-08-06 RX ADMIN — BISACODYL 5 MG: 5 TABLET, COATED ORAL at 22:20

## 2024-08-06 RX ADMIN — ALBUTEROL SULFATE 1.25 MG: 2.5 SOLUTION RESPIRATORY (INHALATION) at 20:34

## 2024-08-06 RX ADMIN — Medication 10 ML: at 22:17

## 2024-08-06 RX ADMIN — SODIUM BICARBONATE 650 MG: 650 TABLET, ORALLY DISINTEGRATING ORAL at 22:16

## 2024-08-06 RX ADMIN — ALBUTEROL SULFATE 1.25 MG: 2.5 SOLUTION RESPIRATORY (INHALATION) at 05:49

## 2024-08-06 RX ADMIN — ASPIRIN 81 MG: 81 TABLET, COATED ORAL at 08:58

## 2024-08-06 RX ADMIN — SODIUM BICARBONATE 650 MG: 650 TABLET, ORALLY DISINTEGRATING ORAL at 08:59

## 2024-08-06 RX ADMIN — IPRATROPIUM BROMIDE 0.5 MG: 0.5 SOLUTION RESPIRATORY (INHALATION) at 14:27

## 2024-08-06 RX ADMIN — FLUTICASONE PROPIONATE 2 SPRAY: 50 SPRAY, METERED NASAL at 08:58

## 2024-08-06 RX ADMIN — SODIUM BICARBONATE 650 MG: 650 TABLET, ORALLY DISINTEGRATING ORAL at 15:33

## 2024-08-06 RX ADMIN — BUMETANIDE 0.5 MG: 1 TABLET ORAL at 08:58

## 2024-08-06 NOTE — PROGRESS NOTES
[facility]    Palliative Care Daily Progress Note   Chief complaint-follow up support for patient/family    Code Status:   Code Status and Medical Interventions: No CPR (Do Not Attempt to Resuscitate); Limited Support; No intubation (DNI), Other; no permanent feeding tube   Ordered at: 08/05/24 1231     Medical Intervention Limits:    No intubation (DNI)    Other     Level Of Support Discussed With:    Patient     Code Status (Patient has no pulse and is not breathing):    No CPR (Do Not Attempt to Resuscitate)     Medical Interventions (Patient has pulse or is breathing):    Limited Support     Additional Medical Interventions Limits:    no permanent feeding tube      Advanced Directives: Advance Directive Status: Patient does not have advance directive   Goals of Care: Ongoing.     S: Medical record reviewed. Events noted.  Kidney function slowly improving.  Anemia stable.  Pulmonology plans to rotate to oral steroids today, recommends outpatient follow-up.  Sitting up in bed without apparent distress.  Without new complaint.  No family at bedside.     Review of Systems   Constitutional: Positive for malaise/fatigue.   Cardiovascular:  Positive for dyspnea on exertion.   Gastrointestinal:  Negative for nausea.   Genitourinary:  Negative for dysuria.   Neurological:  Positive for weakness.   Psychiatric/Behavioral:  The patient is not nervous/anxious.      Pain Assessment  Preferred Pain Scale: number (Numeric Rating Pain Scale)  Nonverbal Indicators of Pain: nonverbal indicators absent    O:     Intake/Output Summary (Last 24 hours) at 8/6/2024 0805  Last data filed at 8/6/2024 0727  Gross per 24 hour   Intake 1020 ml   Output 1350 ml   Net -330 ml       Diagnostics and current medications: Reviewed.      Current Facility-Administered Medications:     acetaminophen (TYLENOL) tablet 650 mg, 650 mg, Oral, Q4H PRN **OR** acetaminophen (TYLENOL) 160 MG/5ML oral solution  650 mg, 650 mg, Oral, Q4H PRN **OR** acetaminophen (TYLENOL) suppository 650 mg, 650 mg, Rectal, Q4H PRN, Kaylie Garcia MD    albuterol (PROVENTIL) nebulizer solution 0.5% 2.5 mg/0.5mL, 1.25 mg, Nebulization, 4x Daily - RT, 1.25 mg at 08/06/24 0549 **AND** ipratropium (ATROVENT) nebulizer solution 0.5 mg, 0.5 mg, Nebulization, 4x Daily - RT, Kaylie Garcia MD, 0.5 mg at 08/06/24 0549    aspirin EC tablet 81 mg, 81 mg, Oral, Daily, Kaylie Garcia MD, 81 mg at 08/05/24 0843    atorvastatin (LIPITOR) tablet 20 mg, 20 mg, Oral, Nightly, Kaylie Garcia MD, 20 mg at 08/05/24 2154    sennosides-docusate (PERICOLACE) 8.6-50 MG per tablet 2 tablet, 2 tablet, Oral, BID PRN **AND** polyethylene glycol (MIRALAX) packet 17 g, 17 g, Oral, Daily PRN **AND** bisacodyl (DULCOLAX) EC tablet 5 mg, 5 mg, Oral, Daily PRN **AND** bisacodyl (DULCOLAX) suppository 10 mg, 10 mg, Rectal, Daily PRN, Kaylie Garcia MD    bumetanide (BUMEX) tablet 0.5 mg, 0.5 mg, Oral, Daily, Farida Santana APRN, 0.5 mg at 08/05/24 0841    cetirizine (zyrTEC) tablet 10 mg, 10 mg, Oral, Daily, Tarik Crocker MD, 10 mg at 08/05/24 0842    dronedarone (MULTAQ) tablet 400 mg, 400 mg, Oral, Q12H, Kaylie Garcia MD, 400 mg at 08/05/24 2154    Enoxaparin Sodium (LOVENOX) syringe 30 mg, 30 mg, Subcutaneous, Q24H, Farida Santana APRN, 30 mg at 08/05/24 0844    fluticasone (FLONASE) 50 MCG/ACT nasal spray 2 spray, 2 spray, Each Nare, Daily, Tarik Crocker MD, 2 spray at 08/05/24 0844    isosorbide mononitrate (IMDUR) 24 hr tablet 30 mg, 30 mg, Oral, Q24H, Kaylie Garcia MD, 30 mg at 08/05/24 0842    melatonin tablet 10 mg, 10 mg, Oral, Nightly, Kaylie Garcia MD, 10 mg at 08/05/24 2154    methylPREDNISolone sodium succinate (SOLU-Medrol) injection 40 mg, 40 mg, Intravenous, Daily, Farida Santana APRN    metoprolol succinate XL (TOPROL-XL) 24 hr tablet 50 mg, 50 mg, Oral, Daily, Kaylie Garcia MD, 50 mg at 08/05/24 0842    montelukast (SINGULAIR)  "tablet 10 mg, 10 mg, Oral, Nightly, Kaylie Garcia MD, 10 mg at 08/05/24 2154    multivitamin with minerals 1 tablet, 1 tablet, Oral, Daily, Kaylie Garcia MD, 1 tablet at 08/05/24 0840    pantoprazole (PROTONIX) EC tablet 40 mg, 40 mg, Oral, Daily, Kaylie Garcia MD, 40 mg at 08/05/24 0843    sodium bicarbonate tablet 650 mg, 650 mg, Oral, TID, Kaylie Garcia MD, 650 mg at 08/05/24 2154    sodium chloride 0.9 % flush 10 mL, 10 mL, Intravenous, Q12H, Kaylie Garcia MD, 10 mL at 08/05/24 2157    sodium chloride 0.9 % flush 10 mL, 10 mL, Intravenous, PRN, Kaylie Garcia MD    sodium chloride 0.9 % infusion 40 mL, 40 mL, Intravenous, PRN, Kaylie Garcia MD    Allergies   Allergen Reactions    Penicillins Hives     I have utilized all available immediate resources to obtain, update, or review the patient's current medications (including all prescriptions, over-the-counter products, herbals, cannabis/cannabidiol products, and vitamin/mineral/dietary (nutritional) supplements) for name, route of administration, type, dose and frequency.    A:    /65 (BP Location: Right arm, Patient Position: Lying)   Pulse 77   Temp 97.5 °F (36.4 °C) (Oral)   Resp 18   Ht 162.6 cm (64.02\")   Wt 75.8 kg (167 lb)   SpO2 93%   BMI 28.65 kg/m²     Vitals and nursing note reviewed.   Constitutional:       Appearance: Not in distress. Chronically ill-appearing.      Interventions: Nasal cannula in place.   Eyes:      Comments: Left eye blindness   HENT:      Head: Normocephalic.   Pulmonary:      Effort: Pulmonary effort is normal.   Cardiovascular:      Normal rate.   Musculoskeletal:      Cervical back: Neck supple.   Genitourinary:     Comments: No reported dysuria  Neurological:      Mental Status: Alert, oriented to person, place, and time and oriented to person, place and time.   Psychiatric:         Mood and Affect: Mood normal.         Cognition and Memory: Cognition normal.      Patient status: Disease state: " Controlled with current treatments.  Current Functional status: Palliative Performance Scale Score: Performance 60% based on the following measures: Ambulation: Reduced, Activity and Evidence of Disease: Unable to do hobby or some work, significant evidence of disease, Self-Care: Occasional assist necessary,  Intake: Normal or reduced, LOC: Full or confusion   Baseline Functional status: Palliative Performance Scale Score: Performance 60% based on the following measures: Ambulation: Reduced, Activity and Evidence of Disease: Unable to do hobby or some work, significant evidence of disease, Self-Care: Occasional assist necessary,  Intake: Normal or reduced, LOC: Full or confusion   Baseline ECOG Status(3) Capable of limited self-care, confined to bed or chair > 50% of waking hours.  Nutritional status: Albumin 3.5 Body mass index is 28.62 kg/m².      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 plan: Goals of care include CODE STATUS no CPR/limited support interventions.     Family support: The patient receives support from his extended family..  Advance Directives: Advance Directive Status: Patient does not have advance directive   POA/Healthcare surrogate-son, Beau-next of kin.     2.  Palliative care encounter  - Prognosis is poor to guarded poor to guarded long-term prognosis secondary to stage IV metastatic adenocarcinoma of the lung, COPD exacerbation, acute on chronic respiratory failure, chronic kidney disease stage IV, pulmonary fibrosis, multiple comorbidities, and advanced age.   -Patient appears to have fair prognostic awareness.      -completed 4 cycles of combination chemotherapy with carboplatin, Keytruda, and Alimta, maintenance Keytruda and Alimta every 3 weeks with final cycle #25 on 10/29/2020, completed SBRT radiotherapy x 5 fractions for to right lung nodules 2/7/2024, Keytruda on hold pending active surveillance.       -Received IV diuretics in the ER and Lokelma.    -Oncology consulted recommended CT chest and pulmonary consultation.    -Pulmonology felt presentation due to COPD exacerbation and started on Symbicort, DuoNeb, Solu-Medrol, fluticasone, and Zyrtec in addition to supplemental oxygen.  Plan for outpatient PFT and sleep study when more stable.   -Patient declined further therapy treatments and anticipate discharge home as prior.  Noted to have increase in oxygen needs with exertion, plan walking oximetry in a.m.       8/5-CODE STATUS changes no CPR/limited support interventions, no intubation, no permanent feeding tube.  -Will plan to complete a MOST document  -Would benefit from completion of advanced directive.  Palliative  to assist with completion of documentation as above.    8/6-continue  supportive measures  -High risk for rehospitalization's.  Discussed progression of disease and best supportive care directed by hospice when rehospitalizations and aggressive care interventions no longer desired.  -Anticipating home as prior.  -Pulmonology plans to rotate steroids to oral dosing, recommends outpatient follow-up  -A MOST document has been initiated, attending to complete      Thank you for allowing us to participate in patient's plan of care. Palliative Care Team will continue to follow patient.     Grace Aly, APRN  8/6/2024  08:05 CDT

## 2024-08-06 NOTE — PROGRESS NOTES
Patient Name: Karoline Prater  Date of Admission: 8/2/2024  Today's Date: 08/06/24  Length of Stay: 1  Primary Care Physician: Irving Alexis MD    Subjective   Chief Complaint: Increasing shortness of breath  Shortness of Breath  Associated symptoms include leg swelling and wheezing. Pertinent negatives include no abdominal pain, chest pain or rhinorrhea.   Leg Swelling  Associated symptoms include coughing, fatigue and weakness. Pertinent negatives include no abdominal pain, chest pain, congestion or nausea.      HPI:  Vianey Salcido 84-year-old white male with past medical history of GERD, blindness of his left eye, hypertension, BPH, pancreatic cancer with metastasis to right lung, CHF, last EF was 61-65% with left ventricular diastolic dysfunction and mild pulmonary hypertension on 7/3/2024, CKD stage III, and fibrotic lung disease.  Patient presented to [facility] emergency department on 8/2/2024 with complaints of shortness of breath requiring continuous use of his as needed oxygenation with associated leg swelling.  Patient states his shortness of breath is worse with exertion, denies any chest pain, nausea, vomiting, loss of consciousness, fever or change in his urinary or bowel habits.  On admission patient's blood pressure was soft, other vital signs were stable.  Patient received IV Lasix 20 mg and states he is passing urine.  ED lab results troponin x 2 mildly elevated, potassium of 5.4, , proBNP of 295.5, BUN of 30, creatinine of 2.44, CBC unremarkable, respiratory panel negative and chest x-ray mass in the upper midlung zone on the right larger compared to prior study measuring the range of about 4.8 cm, stable pulmonary fibrosis with cardiomegaly.  Patient was admitted for continued observation and treatment    Today:  Today patient is resting comfortably in bed and as always extremely pleasant.  Patient is alert and oriented and able to participate in assessment.  Patient remains  stable on 2 L of oxygen.  Walking oximetry was performed today per RT with recommendations for 2 L at rest and 3 L with exertion.  Patient's coloring and demeanor continue to improve every day.  Patient states he was able to walk up to the nurses station and back on his oxygen with greatly improved exertional dyspnea.  He again states that he feels that he will be safe at home with his brother nearby and that he has all the DME required for home.  Discussed the case with Nimisha SALAS, pulmonology who stated that Dr. Crocker would like the patient to begin p.o. prednisone tomorrow and if continued improvement will be able to discharge home with close follow-up.  All patient's questions were answered to the best of my ability and he continues to be in agreement for palliative care discussions, pulmonary and oncology consultation.        TREATMENT SUMMARY  Keytruda, carboplatin, Alimta initiated 4/18/2019 to be given every 3 weeks for 4 cycles - 4th cycle 7/5/2019, then Keytruda + Alimta as maintenance to continue indefinitely until progression or intolerable side effects   Fahad received cycle #25 of Keytruda/Alimta on 10/29/2020. He was then placed on an indefinite chemotherapy holiday on 12/30/2020  SBRT by Dr. Danny Cool was delivered in 5 treatment fractions for a total dose of 5000 cGy. XRT was initiated 1/24/2024 and was completed on 2/7/2024 08/05/24  0451 08/06/24  0454   Weight: 75.7 kg (166 lb 12.8 oz) 75.8 kg (167 lb)        Review of Systems   Constitutional:  Positive for fatigue.   HENT:  Negative for congestion, rhinorrhea and trouble swallowing.    Eyes: Negative.    Respiratory:  Positive for cough, shortness of breath and wheezing.    Cardiovascular:  Positive for leg swelling. Negative for chest pain.   Gastrointestinal:  Negative for abdominal distention, abdominal pain, diarrhea and nausea.   Genitourinary:  Negative for difficulty urinating and flank pain.    Musculoskeletal: Negative.    Skin:  Positive for pallor.   Allergic/Immunologic: Negative.    Neurological:  Positive for weakness. Negative for dizziness and syncope.   Hematological: Negative.    Psychiatric/Behavioral: Negative.        All pertinent negatives and positives are as above. All other systems have been reviewed and are negative unless otherwise stated.     Objective    Temp:  [97.3 °F (36.3 °C)-98.1 °F (36.7 °C)] 97.5 °F (36.4 °C)  Heart Rate:  [70-91] 77  Resp:  [16-20] 16  BP: (120-153)/(61-85) 129/65  Physical Exam  Vitals and nursing note reviewed.   Constitutional:       Appearance: He is ill-appearing.      Comments: Patient is resting comfortably in bed on 2.5 L with oxygen saturation at 92%.  No family at bedside   HENT:      Head: Normocephalic.      Right Ear: Tympanic membrane normal.      Left Ear: Tympanic membrane normal.      Nose: Nose normal.      Mouth/Throat:      Mouth: Mucous membranes are moist.      Pharynx: Oropharynx is clear.   Eyes:      Pupils: Pupils are equal, round, and reactive to light.   Cardiovascular:      Rate and Rhythm: Normal rate and regular rhythm.      Pulses: Normal pulses.      Heart sounds: Normal heart sounds.   Pulmonary:      Effort: Accessory muscle usage present. No tachypnea or respiratory distress.      Breath sounds: Examination of the left-upper field reveals wheezing. Examination of the left-middle field reveals wheezing. Examination of the right-lower field reveals wheezing. Wheezing and rhonchi present.      Comments: Significant exertional dyspnea  Chest:      Chest wall: No tenderness.   Abdominal:      General: Abdomen is flat.      Palpations: Abdomen is soft.   Genitourinary:     Comments: Voiding per urinal  Musculoskeletal:      Cervical back: Normal range of motion.      Right lower leg: No edema.      Left lower leg: Edema present.      Comments: Generalized weakness   Skin:     General: Skin is warm.      Capillary Refill:  "Capillary refill takes less than 2 seconds.      Coloration: Skin is pale.   Neurological:      General: No focal deficit present.      Mental Status: He is alert and oriented to person, place, and time.   Psychiatric:         Mood and Affect: Mood normal.         Behavior: Behavior normal.         Thought Content: Thought content normal.         Judgment: Judgment normal.         Results Review:  I have reviewed the labs, radiology results, and diagnostic studies.    Laboratory Data:   Results from last 7 days   Lab Units 08/06/24  0352 08/05/24  0249 08/04/24  0412   WBC 10*3/mm3 8.67 9.70 6.57   HEMOGLOBIN g/dL 11.8* 11.7* 11.6*   HEMATOCRIT % 38.7 38.4 37.3*   PLATELETS 10*3/mm3 215 223 218        Results from last 7 days   Lab Units 08/06/24  0352 08/05/24  1517 08/05/24  0249 08/04/24  1545 08/04/24  0412 08/03/24  0335 08/02/24  1604   SODIUM mmol/L 141  --  139  --  139   < > 139   POTASSIUM mmol/L 4.9 4.6 5.3*   < > 5.3*   < > 5.4*   CHLORIDE mmol/L 104  --  104  --  104   < > 105   CO2 mmol/L 29.0  --  25.0  --  24.0   < > 23.0   BUN mg/dL 39*  --  36*  --  33*   < > 30*   CREATININE mg/dL 2.19*  --  2.39*  --  2.44*   < > 2.44*   CALCIUM mg/dL 10.2  --  10.1  --  10.2   < > 9.6   BILIRUBIN mg/dL  --   --   --   --   --   --  0.4   ALK PHOS U/L  --   --   --   --   --   --  132*   ALT (SGPT) U/L  --   --   --   --   --   --  26   AST (SGOT) U/L  --   --   --   --   --   --  42*   GLUCOSE mg/dL 152*  --  148*  --  170*   < > 126*    < > = values in this interval not displayed.       Culture Data:   No results found for: \"BLOODCX\", \"URINECX\", \"WOUNDCX\", \"MRSACX\", \"RESPCX\", \"STOOLCX\"    Microbiology Results (last 10 days)       Procedure Component Value - Date/Time    Respiratory Panel PCR w/COVID-19(SARS-CoV-2) JORDYN/MATILDE/BRYAN/PAD/COR/ASHER In-House, NP Swab in UTM/VTM, 2 HR TAT - Swab, Nasopharynx [[id number]]  (Normal) Collected: 08/02/24 1610    Lab Status: Final result Specimen: Swab from Nasopharynx Updated: " 08/02/24 1711     ADENOVIRUS, PCR Not Detected     Coronavirus 229E Not Detected     Coronavirus HKU1 Not Detected     Coronavirus NL63 Not Detected     Coronavirus OC43 Not Detected     COVID19 Not Detected     Human Metapneumovirus Not Detected     Human Rhinovirus/Enterovirus Not Detected     Influenza A PCR Not Detected     Influenza B PCR Not Detected     Parainfluenza Virus 1 Not Detected     Parainfluenza Virus 2 Not Detected     Parainfluenza Virus 3 Not Detected     Parainfluenza Virus 4 Not Detected     RSV, PCR Not Detected     Bordetella pertussis pcr Not Detected     Bordetella parapertussis PCR Not Detected     Chlamydophila pneumoniae PCR Not Detected     Mycoplasma pneumo by PCR Not Detected    Narrative:      In the setting of a positive respiratory panel with a viral infection PLUS a negative procalcitonin without other underlying concern for bacterial infection, consider observing off antibiotics or discontinuation of antibiotics and continue supportive care. If the respiratory panel is positive for atypical bacterial infection (Bordetella pertussis, Chlamydophila pneumoniae, or Mycoplasma pneumoniae), consider antibiotic de-escalation to target atypical bacterial infection.             Radiology Data:   Imaging Results (Last 24 Hours)       Procedure Component Value Units Date/Time    US Venous Doppler Lower Extremity Bilateral (duplex) [[id number]] Collected: 08/05/24 1456     Updated: 08/05/24 1500    Narrative:      History: Swelling       Impression:      Impression: There is no evidence of deep venous thrombosis or  superficial thrombophlebitis of right or left lower extremities.     Comments: Bilateral lower extremity venous duplex exam was performed  using color Doppler flow, Doppler waveform analysis, and grayscale  imaging, with and without compression. There is no evidence of deep  venous thrombosis in the common femoral, superficial femoral, popliteal,  peroneal, anterior tibial, and  posterior tibial veins bilaterally. No  thrombus is identified in the saphenofemoral junctions and greater  saphenous veins bilaterally.            This report was signed and finalized on 8/5/2024 2:56 PM by Dr. Sidney Connors MD.               I have reviewed the patient's current medications.     albuterol, 1.25 mg, Nebulization, 4x Daily - RT   And  ipratropium, 0.5 mg, Nebulization, 4x Daily - RT  aspirin, 81 mg, Oral, Daily  atorvastatin, 20 mg, Oral, Nightly  bumetanide, 0.5 mg, Oral, Daily  cetirizine, 10 mg, Oral, Daily  dronedarone, 400 mg, Oral, Q12H  enoxaparin, 30 mg, Subcutaneous, Q24H  fluticasone, 2 spray, Each Nare, Daily  isosorbide mononitrate, 30 mg, Oral, Q24H  melatonin, 10 mg, Oral, Nightly  metoprolol succinate XL, 50 mg, Oral, Daily  montelukast, 10 mg, Oral, Nightly  multivitamin with minerals, 1 tablet, Oral, Daily  pantoprazole, 40 mg, Oral, Daily  [START ON 8/7/2024] predniSONE, 40 mg, Oral, Daily  sodium bicarbonate, 650 mg, Oral, TID  sodium chloride, 10 mL, Intravenous, Q12H         Intake/Output Summary (Last 24 hours) at 8/6/2024 1056  Last data filed at 8/6/2024 0946  Gross per 24 hour   Intake 1020 ml   Output 1650 ml   Net -630 ml        Assessment/Plan   Assessment  Active Hospital Problems    Diagnosis     **Acute on chronic respiratory failure with hypoxia     CHF (congestive heart failure)     Chronic diastolic congestive heart failure     COPD with acute exacerbation     S/P radiation > 12 weeks     Bilateral lower extremity edema     Former smoker     Hyperkalemia     Pulmonary fibrosis     Stage 4 chronic kidney disease     Malignant neoplasm of upper lobe of right lung        Treatment Plan    1. Acute on chronic respiratory failure with hypoxia-upon admission to the emergency department patient presented with his home 2 L of oxygen that he normally wears as needed.  Patient's oxygen saturation on room air was reported as 86%.  Continue  dyspnea on exertion, oxygen  saturations dropped to 76% on 2 L and patient required 4 L of oxygen to maintain saturations greater than 90%.  Transition Solu-Medrol to p.o. prednisone.  Zyrtec, Flonase, and Singulair. Continue nebulizers, as needed ABGs incentive spirometry, supplemental oxygen with notification for provider for any sustained usage greater than 4 L.  Walking oximetry performed today with recommendations for 2 L at rest and 3 L with exertion, new prescription will be sent to his [employer].    2.  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  Chest x-ray performed on admission revealed a known mass followed by Dr. Michele and Dr. Cool to be increasing in size in the mid lung zone of on the right.  Patient received Keytruda, chemotherapy, and radiation which concluded with radiation in February 2024.  Due to new findings oncology consultation per Dr. Rico discussed the case with Dr. Cool and ordered a CT scan of the chest to be compared with previous.  CT scan revealed stable spiculated nodule within the medial right apex.  Masslike focus within the base of the right upper lobe shows increased size compared with 5 weeks ago.  Diffuse interstitial and reticulonodular disease within both lungs is more prominent now as compared with 5 weeks ago.  To be reviewed by oncology, palliative care discussion with request for patient to be changed to no CPR/limited support interventions no intubation no permanent feeding tube.  A MOST form was initiated and recommendations for a complete advance directive, which will be done with palliative .    2.  Bilateral lower extremity edema-patient has a history of chronic lower extremity edema on daily Lasix.  Patient initially presented with shortness of breath and 2+ pitting edema.  Continues to significantly improved, no edema present with improving erythema.   Continue p.o. Bumex and close monitoring of erythema due to history of cellulitis.    4.  Stage III chronic kidney disease-patient's initial presentation was BUN of 27 and creatinine of 2.46 with a GFR of 25.5.  Previous discharge creatinine at 2.34.  Today creatinine remained stable at 2.19 ,continue to review nephrotoxic agents and monitor with daily BMP.    5.  Hyperkalemia-chronic history of hyperkalemia, presented with potassium of 5.4 given Lokelma x 1.  After discontinued heparin and initiated low-dose Lovenox calcium was stable this a.m., continue to monitor with daily BMP    6.  Chronic diastolic heart failure with preserved ejection fraction-last echocardiogram 7/3/2024 revealed ejection fraction of 60-65%.  BNP on admission was 295.  Patient has chronic complaints of bilateral lower extremity edema and shortness of breath.  Patient it is optimized on metoprolol, isosorbide, and losartan.  Continue strict intake and output, daily weights, heart failure education.    Due to patient's recent history of DVT, venous Doppler was performed without evidence of thrombus.  VTE prophylaxis Lovenox   Labs in a.m.    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     [facility] Data  External documents reviewed: None   cardiac tracing (EKG, telemetry) interpretation: None  Radiology interpretation: None  Labs reviewed:   8/6/2024 CBC, BMP reviewed repeat in a.m.  Any tests that were considered but not ordered: CTA  of the chest, due to patient's CKD will leave it up to oncology     Decision rules/scores evaluated (example [id number], Wells, etc): [id number] score of 5     Discussed with: Nimisha Rey and patient     Care Planning  Shared decision making: Lorrie Rey and patient  code status and discussions: Changed to DNR/DNI   surrogate Decision Maker is son Beau or brother Keshav    Disposition  Social Determinants of Health that impact treatment or disposition: Lives at home alone and may require assistance with progressing lung cancer.  Palliative care and social work consult initiated  I expect the patient to be discharged to home with home health in 1 days.     Electronically signed by MARITZA Johnson, 08/06/24, 10:56 CDT.

## 2024-08-06 NOTE — PLAN OF CARE
Goal Outcome Evaluation:           Progress: improving  Outcome Evaluation: Pt was S 63-98 on tele. Pt slept well through the night. Pt complains of no pain. Pt sats within normal limits.

## 2024-08-06 NOTE — PROCEDURES
Exercise Oximetry    Patient Name:Karoline Prater   MRN: [id number]   Date: 08/06/24             ROOM AIR BASELINE   SpO2% 87   Heart Rate 86   Blood Pressure      EXERCISE ON ROOM AIR SpO2% EXERCISE ON O2 @ 2 LPM SpO2%   1 MINUTE  1 MINUTE    2 MINUTES  2 MINUTES 84   3 MINUTES  3 MINUTES Inc. To 3 lpm    4 MINUTES  4 MINUTES    5 MINUTES  5 MINUTES 94   6 MINUTES  6 MINUTES                Distance Walked   Distance Walked 80 ft.   Dyspnea (Pina Scale)   Dyspnea (Pina Scale) 2   Fatigue (Pina Scale)   Fatigue (Pina Scale)   SpO2% Post Exercise   SpO2% Post Exercise 92   HR Post Exercise   HR Post Exercise 163   Time to Recovery   Time to Recovery 2 minutes     Comments: Pt. Walked 80 ft in hallway on 2 lpm. Sat fell to 84%. Pt. Felt short of air. Increased to 3 lpm. Pt. Recovered to 94% after 2 minutes. Walked pt. 100 ft and pt. Maintained O2 sat of 92%.

## 2024-08-06 NOTE — PROGRESS NOTES
[facility] PULMONARY PROGRESS NOTE - [facility]    Patient: Karoline Prater  1940   MR# [id number]   Acct# [id number]  08/06/24   07:53 CDT  Referring Provider: Franny Montilla MD    Chief Complaint: Shortness of breath  Interval history: The patient is awake and alert eating breakfast on room air.  He tells me that he slept well last night and is feeling much better.  Creatinine 2.19, hemoglobin 11.8.  He is looking forward to getting up and walking today.  Solu-Medrol has been transition to oral prednisone.  He has no new or worsening pulmonary complaints.   Meds:  albuterol, 1.25 mg, Nebulization, 4x Daily - RT   And  ipratropium, 0.5 mg, Nebulization, 4x Daily - RT  aspirin, 81 mg, Oral, Daily  atorvastatin, 20 mg, Oral, Nightly  bumetanide, 0.5 mg, Oral, Daily  cetirizine, 10 mg, Oral, Daily  dronedarone, 400 mg, Oral, Q12H  enoxaparin, 30 mg, Subcutaneous, Q24H  fluticasone, 2 spray, Each Nare, Daily  isosorbide mononitrate, 30 mg, Oral, Q24H  melatonin, 10 mg, Oral, Nightly  methylPREDNISolone sodium succinate, 40 mg, Intravenous, Daily  metoprolol succinate XL, 50 mg, Oral, Daily  montelukast, 10 mg, Oral, Nightly  multivitamin with minerals, 1 tablet, Oral, Daily  pantoprazole, 40 mg, Oral, Daily  sodium bicarbonate, 650 mg, Oral, TID  sodium chloride, 10 mL, Intravenous, Q12H         Physical Exam:  SpO2 Percentage    08/06/24 0549 08/06/24 0555 08/06/24 0727   SpO2: 98% 95% 93%     Body mass index is 28.65 kg/m².   Temp:  [97.3 °F (36.3 °C)-98.1 °F (36.7 °C)] 97.5 °F (36.4 °C)  Heart Rate:  [70-91] 77  Resp:  [16-20] 18  BP: (120-153)/(61-85) 129/65  Intake/Output Summary (Last 24 hours) at 8/6/2024 0753  Last data filed at 8/6/2024 0727  Gross per 24 hour   Intake 1020 ml   Output 1350 ml   Net -330 ml     Physical Exam  Vitals and nursing note reviewed.   Constitutional:       General: He is not in acute distress.     Comments: Very pleasant   HENT:      Head: Normocephalic.       Nose: Nose normal.   Eyes:      General: No scleral icterus.  Cardiovascular:      Rate and Rhythm: Normal rate.   Pulmonary:      Effort: No respiratory distress.      Breath sounds: Decreased breath sounds and rales present.   Abdominal:      General: There is no distension.   Skin:     General: Skin is warm and dry.   Neurological:      Mental Status: He is alert. Mental status is at baseline.   Psychiatric:         Mood and Affect: Mood normal.         Behavior: Behavior normal. Behavior is cooperative.       Electronically signed by MARITZA Hawthorne, 8/6/2024, 07:53 CDT      Physician substantive portion: medical decision making    Physician substantive portion: medical decision making  Result Review  Laboratory Data:  Results from last 7 days   Lab Units 08/06/24  0352 08/05/24  0249 08/04/24  0412   WBC 10*3/mm3 8.67 9.70 6.57   HEMOGLOBIN g/dL 11.8* 11.7* 11.6*   PLATELETS 10*3/mm3 215 223 218     Results from last 7 days   Lab Units 08/06/24  0352 08/05/24  1517 08/05/24  0249 08/04/24  1545 08/04/24  0412 08/03/24  0335 08/02/24  1608   SODIUM mmol/L 141  --  139  --  139   < >  --    POTASSIUM mmol/L 4.9 4.6 5.3*   < > 5.3*   < >  --    CO2 mmol/L 29.0  --  25.0  --  24.0   < >  --    BUN mg/dL 39*  --  36*  --  33*   < >  --    CREATININE mg/dL 2.19*  --  2.39*  --  2.44*   < >  --    LACTATE mmol/L  --   --   --   --   --   --  1.4    < > = values in this interval not displayed.         Microbiology Results (last 10 days)       Procedure Component Value - Date/Time    Respiratory Panel PCR w/COVID-19(SARS-CoV-2) JORDYN/MATILDE/BRYAN/PAD/COR/ASHER In-House, NP Swab in UTM/VTM, 2 HR TAT - Swab, Nasopharynx [[id number]]  (Normal) Collected: 08/02/24 1610    Lab Status: Final result Specimen: Swab from Nasopharynx Updated: 08/02/24 1711     ADENOVIRUS, PCR Not Detected     Coronavirus 229E Not Detected     Coronavirus HKU1 Not Detected     Coronavirus NL63 Not Detected     Coronavirus OC43 Not Detected      COVID19 Not Detected     Human Metapneumovirus Not Detected     Human Rhinovirus/Enterovirus Not Detected     Influenza A PCR Not Detected     Influenza B PCR Not Detected     Parainfluenza Virus 1 Not Detected     Parainfluenza Virus 2 Not Detected     Parainfluenza Virus 3 Not Detected     Parainfluenza Virus 4 Not Detected     RSV, PCR Not Detected     Bordetella pertussis pcr Not Detected     Bordetella parapertussis PCR Not Detected     Chlamydophila pneumoniae PCR Not Detected     Mycoplasma pneumo by PCR Not Detected    Narrative:      In the setting of a positive respiratory panel with a viral infection PLUS a negative procalcitonin without other underlying concern for bacterial infection, consider observing off antibiotics or discontinuation of antibiotics and continue supportive care. If the respiratory panel is positive for atypical bacterial infection (Bordetella pertussis, Chlamydophila pneumoniae, or Mycoplasma pneumoniae), consider antibiotic de-escalation to target atypical bacterial infection.           Recent films:  Walking Oximetry    Result Date: 8/6/2024  Mahnaz Santiago, CELSA     8/6/2024  9:24 AM Exercise Oximetry Patient Name:Karoline Prater MRN: [id number] Date: 08/06/24         ROOM AIR BASELINE SpO2% 87 Heart Rate 86 Blood Pressure  EXERCISE ON ROOM AIR SpO2% EXERCISE ON O2 @ 2 LPM SpO2% 1 MINUTE  1 MINUTE  2 MINUTES  2 MINUTES 84 3 MINUTES  3 MINUTES Inc. To 3 lpm  4 MINUTES  4 MINUTES  5 MINUTES  5 MINUTES 94 6 MINUTES  6 MINUTES            Distance Walked   Distance Walked 80 ft. Dyspnea (Pina Scale)   Dyspnea (Pina Scale) 2 Fatigue (Pina Scale)   Fatigue (Pina Scale) SpO2% Post Exercise   SpO2% Post Exercise 92 HR Post Exercise   HR Post Exercise 163 Time to Recovery   Time to Recovery 2 minutes Comments: Pt. Walked 80 ft in hallway on 2 lpm. Sat fell to 84%. Pt. Felt short of air. Increased to 3 lpm. Pt. Recovered to 94% after 2 minutes. Walked pt. 100 ft and pt. Maintained O2 sat of  92%.    Personal review of imaging : CXR shows pulmonary fibrosis right middle lung mass and cardiomegaly.      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  History of tobacco use  Chronic respiratory failure and hypoxia on home oxygen  Hypertension  Hyperlipidemia  Chronic congestive diastolic heart failure with preserved ejection fraction  Atrial fibrillation  Thrombocytopenia and neutropenia/anemia requiring blood transfusion  Severe hearing impairment  Chronic kidney disease stage III  Allergic rhinitis    Recommend/plan:   Patient was seen in the follow-up visit in pulmonary rounds in medical floor today  He appears comfortable on 2 L oxygen at rest and 3 L during activity.  He did walk in the hallway today..  No new complaints.  Walking oximetry reviewed  Medrol changed to oral prednisone taper.  Anticipated discharge in 1 to 2 days  Continue bronchodilator treatment.  Routine respiratory care  Diuresis with Bumex with monitoring of renal function will function abnormal but stable  Continue incentive spirometry and flutter valve.  DVT and stress ulcer prophylaxis.  Pain and anxiety control repeat labs imaging studies from time to time.  Oncology following  Nutritional support.  Physical activity as tolerated..  Chest x-ray for tomorrow morning  Code status: Full.  Overall prognosis: Guarded.  We will follow and he will need long-term follow-up in the pulmonary clinic.    Time spent by me: 35 min    This visit was performed by both a physician and an [facility] RN.  I performed all aspects of the medical decision making as documented.    Electronically signed by     [employer],  MD,  Pulmonologist/Intensivist   8/6/2024, 17:31 CDT

## 2024-08-07 ENCOUNTER — APPOINTMENT (OUTPATIENT)
Dept: GENERAL RADIOLOGY | Facility: HOSPITAL | Age: 84
DRG: 190 | End: 2024-08-07
Payer: MEDICARE

## 2024-08-07 ENCOUNTER — APPOINTMENT (OUTPATIENT)
Dept: RADIATION ONCOLOGY | Facility: HOSPITAL | Age: 84
End: 2024-08-07
Payer: MEDICARE

## 2024-08-07 LAB
ANION GAP SERPL CALCULATED.3IONS-SCNC: 10 MMOL/L (ref 5–15)
BUN SERPL-MCNC: 41 MG/DL (ref 8–23)
BUN/CREAT SERPL: 21 (ref 7–25)
CALCIUM SPEC-SCNC: 10.2 MG/DL (ref 8.6–10.5)
CHLORIDE SERPL-SCNC: 104 MMOL/L (ref 98–107)
CO2 SERPL-SCNC: 24 MMOL/L (ref 22–29)
CREAT SERPL-MCNC: 1.95 MG/DL (ref 0.76–1.27)
DEPRECATED RDW RBC AUTO: 59.7 FL (ref 37–54)
EGFRCR SERPLBLD CKD-EPI 2021: 33.7 ML/MIN/1.73
ERYTHROCYTE [DISTWIDTH] IN BLOOD BY AUTOMATED COUNT: 16.5 % (ref 12.3–15.4)
GLUCOSE SERPL-MCNC: 90 MG/DL (ref 65–99)
HCT VFR BLD AUTO: 39.2 % (ref 37.5–51)
HGB BLD-MCNC: 12.2 G/DL (ref 13–17.7)
MCH RBC QN AUTO: 30.5 PG (ref 26.6–33)
MCHC RBC AUTO-ENTMCNC: 31.1 G/DL (ref 31.5–35.7)
MCV RBC AUTO: 98 FL (ref 79–97)
PLATELET # BLD AUTO: 198 10*3/MM3 (ref 140–450)
PMV BLD AUTO: 11.7 FL (ref 6–12)
POTASSIUM SERPL-SCNC: 4.1 MMOL/L (ref 3.5–5.2)
RBC # BLD AUTO: 4 10*6/MM3 (ref 4.14–5.8)
SODIUM SERPL-SCNC: 138 MMOL/L (ref 136–145)
WBC NRBC COR # BLD AUTO: 10.09 10*3/MM3 (ref 3.4–10.8)

## 2024-08-07 PROCEDURE — 85027 COMPLETE CBC AUTOMATED: CPT

## 2024-08-07 PROCEDURE — 93010 ELECTROCARDIOGRAM REPORT: CPT | Performed by: EMERGENCY MEDICINE

## 2024-08-07 PROCEDURE — 94799 UNLISTED PULMONARY SVC/PX: CPT

## 2024-08-07 PROCEDURE — 71045 X-RAY EXAM CHEST 1 VIEW: CPT

## 2024-08-07 PROCEDURE — 93005 ELECTROCARDIOGRAM TRACING: CPT | Performed by: HOSPITALIST

## 2024-08-07 PROCEDURE — 80048 BASIC METABOLIC PNL TOTAL CA: CPT

## 2024-08-07 PROCEDURE — 94761 N-INVAS EAR/PLS OXIMETRY MLT: CPT

## 2024-08-07 PROCEDURE — 99233 SBSQ HOSP IP/OBS HIGH 50: CPT | Performed by: INTERNAL MEDICINE

## 2024-08-07 PROCEDURE — 63710000001 PREDNISONE PER 1 MG

## 2024-08-07 PROCEDURE — 25010000002 ENOXAPARIN PER 10 MG

## 2024-08-07 RX ORDER — PREDNISONE 20 MG/1
20 TABLET ORAL DAILY
Qty: 3 TABLET | Refills: 0 | Status: SHIPPED | OUTPATIENT
Start: 2024-08-12 | End: 2024-08-08

## 2024-08-07 RX ORDER — CETIRIZINE HYDROCHLORIDE 10 MG/1
10 TABLET ORAL DAILY
Qty: 30 TABLET | Refills: 2 | Status: SHIPPED | OUTPATIENT
Start: 2024-08-08 | End: 2024-08-08

## 2024-08-07 RX ORDER — BUMETANIDE 0.5 MG/1
0.5 TABLET ORAL DAILY
Qty: 30 TABLET | Refills: 2 | Status: SHIPPED | OUTPATIENT
Start: 2024-08-08 | End: 2024-11-06

## 2024-08-07 RX ORDER — METOPROLOL TARTRATE 1 MG/ML
5 INJECTION, SOLUTION INTRAVENOUS ONCE
Status: COMPLETED | OUTPATIENT
Start: 2024-08-07 | End: 2024-08-07

## 2024-08-07 RX ORDER — PREDNISONE 20 MG/1
40 TABLET ORAL DAILY
Qty: 6 TABLET | Refills: 0 | Status: SHIPPED | OUTPATIENT
Start: 2024-08-08 | End: 2024-08-08

## 2024-08-07 RX ORDER — TAMSULOSIN HYDROCHLORIDE 0.4 MG/1
0.4 CAPSULE ORAL DAILY
Status: DISCONTINUED | OUTPATIENT
Start: 2024-08-07 | End: 2024-08-08 | Stop reason: HOSPADM

## 2024-08-07 RX ADMIN — ALBUTEROL SULFATE 1.25 MG: 2.5 SOLUTION RESPIRATORY (INHALATION) at 14:22

## 2024-08-07 RX ADMIN — ISOSORBIDE MONONITRATE 30 MG: 30 TABLET, EXTENDED RELEASE ORAL at 09:51

## 2024-08-07 RX ADMIN — MONTELUKAST SODIUM 10 MG: 10 TABLET, FILM COATED ORAL at 20:25

## 2024-08-07 RX ADMIN — IPRATROPIUM BROMIDE 0.5 MG: 0.5 SOLUTION RESPIRATORY (INHALATION) at 06:00

## 2024-08-07 RX ADMIN — ASPIRIN 81 MG: 81 TABLET, COATED ORAL at 09:51

## 2024-08-07 RX ADMIN — IPRATROPIUM BROMIDE 0.5 MG: 0.5 SOLUTION RESPIRATORY (INHALATION) at 20:02

## 2024-08-07 RX ADMIN — METOPROLOL SUCCINATE 50 MG: 50 TABLET, EXTENDED RELEASE ORAL at 09:51

## 2024-08-07 RX ADMIN — SODIUM BICARBONATE 650 MG: 650 TABLET, ORALLY DISINTEGRATING ORAL at 16:30

## 2024-08-07 RX ADMIN — PANTOPRAZOLE SODIUM 40 MG: 40 TABLET, DELAYED RELEASE ORAL at 09:51

## 2024-08-07 RX ADMIN — PREDNISONE 40 MG: 20 TABLET ORAL at 09:51

## 2024-08-07 RX ADMIN — ATORVASTATIN CALCIUM 20 MG: 10 TABLET, FILM COATED ORAL at 20:24

## 2024-08-07 RX ADMIN — BUMETANIDE 0.5 MG: 1 TABLET ORAL at 09:51

## 2024-08-07 RX ADMIN — CETIRIZINE HYDROCHLORIDE 10 MG: 10 TABLET ORAL at 09:51

## 2024-08-07 RX ADMIN — DRONEDARONE 400 MG: 400 TABLET, FILM COATED ORAL at 09:51

## 2024-08-07 RX ADMIN — ALBUTEROL SULFATE 1.25 MG: 2.5 SOLUTION RESPIRATORY (INHALATION) at 06:00

## 2024-08-07 RX ADMIN — IPRATROPIUM BROMIDE 0.5 MG: 0.5 SOLUTION RESPIRATORY (INHALATION) at 14:22

## 2024-08-07 RX ADMIN — TAMSULOSIN HYDROCHLORIDE 0.4 MG: 0.4 CAPSULE ORAL at 14:19

## 2024-08-07 RX ADMIN — ALBUTEROL SULFATE 1.25 MG: 2.5 SOLUTION RESPIRATORY (INHALATION) at 20:02

## 2024-08-07 RX ADMIN — SODIUM BICARBONATE 650 MG: 650 TABLET, ORALLY DISINTEGRATING ORAL at 09:52

## 2024-08-07 RX ADMIN — Medication 10 ML: at 20:25

## 2024-08-07 RX ADMIN — METOPROLOL TARTRATE 5 MG: 5 INJECTION INTRAVENOUS at 07:58

## 2024-08-07 RX ADMIN — ENOXAPARIN SODIUM 30 MG: 100 INJECTION SUBCUTANEOUS at 09:52

## 2024-08-07 RX ADMIN — Medication 1 TABLET: at 09:51

## 2024-08-07 RX ADMIN — SODIUM BICARBONATE 650 MG: 650 TABLET, ORALLY DISINTEGRATING ORAL at 20:24

## 2024-08-07 RX ADMIN — IPRATROPIUM BROMIDE 0.5 MG: 0.5 SOLUTION RESPIRATORY (INHALATION) at 10:32

## 2024-08-07 RX ADMIN — FLUTICASONE PROPIONATE 2 SPRAY: 50 SPRAY, METERED NASAL at 09:52

## 2024-08-07 RX ADMIN — Medication 10 ML: at 09:52

## 2024-08-07 RX ADMIN — ALBUTEROL SULFATE 2.5 MG: 2.5 SOLUTION RESPIRATORY (INHALATION) at 10:32

## 2024-08-07 RX ADMIN — DRONEDARONE 400 MG: 400 TABLET, FILM COATED ORAL at 20:25

## 2024-08-07 RX ADMIN — Medication 10 MG: at 20:24

## 2024-08-07 NOTE — PLAN OF CARE
Goal Outcome Evaluation:  Plan of Care Reviewed With: patient           Outcome Evaluation: Patient with no c/o pain this shift. Converted to AFl at 0704 this morning with rates up to 180s, IV lopressor given per order and patient converted back to NSR at 0910. Patient reported difficulty emptying bladder, bladder scan revealed 918 ml. In/out cath performed. Probable discharge home tomorrow. VSS, safety maintained.

## 2024-08-07 NOTE — DISCHARGE PLACEMENT REQUEST
"From:  Molly Heard, BSW  [phone]    Karoline Miranda \"Fahad\" (84 y.o. Male)       Date of Birth   1940    Social Security Number       [address] [address]    Home Phone   [phone]    MRN   [id number]       [employer]   [employer]    Marital Status   Single                            Admission Date   8/2/24    Admission Type   Emergency    Admitting Provider   Franny Montilla MD    Attending Provider   Franny Montilla MD    Department, Room/Bed   [facility] 4B, 407/1       Discharge Date       [place] Dis[place] [facility] [place]                                 Attending Provider: Franny Montilla MD    Allergies: Penicillins    Isolation: None   Infection: None   Code Status: No CPR    Ht: 162.6 cm (64.02\")   Wt: 75.5 kg (166 lb 6.4 oz)    Admission Cmt: None   Principal Problem: Acute on chronic respiratory failure with hypoxia [J96.21]                   Active Insurance as of 8/2/2024       Primary Coverage       Payor Plan Insurance Group Employer/Plan Group    MEDICARE MEDICARE A & B        Payor Plan Address Payor Plan Phone Number Payor Plan Fax Number Effective Dates    [address] [phone]  6/1/2007 - None Entered    [facility] [id number]         Subscriber Name Subscriber Birth Date Member ID       KAROLINE MIRANDA 1940 [id number]               Secondary Coverage       Payor Plan Insurance Group Employer/Plan Group    COMBINED INSURANCE CO COMBINED INSURANCE CO NGN       Payor Plan Address Payor Plan Phone Number Payor Plan Fax Number Effective Dates    [address] [phone]  12/1/2013 - None Entered    [facility] [id number]         Subscriber Name Subscriber Birth Date Member ID       KAROLINE MIRANDA 1940 [id number]                     Emergency Contacts        (Rel.) Home Phone Work Phone Mobile Phone    Beau Miranda (Son) [phone] -- [phone]    MoiKeshav (Brother) -- " [phone]    Ulysses Fatima (Friend) -- -- [phone]          Ambulatory Referral to Home Health ([facility]) [[id number]] (Order [id number])  Order  Date: 8/7/2024 Department: [address] Ordering/Authorizing: Farida Santana APRN     Order History  Outpatient  Date/Time Action Taken User Additional Information   08/07/24 0642 Sign Farida Santana APRN      Order Details    Frequency Duration Priority Order Class   None None Routine Internal Referral     Start Date/Time    Start Date   08/07/24     Order Information    Order Date Service Start Date Start Time   08/07/24 Medicine 08/07/24      Reference Links    Associated Reports External References   View Encounter Current Health Referral Information     Order Questions    Question Answer   Face to Face Visit Date: 8/7/2024   Follow-up provider for Plan of Care? I treated the patient in an acute care facility and will not continue treatment after discharge.   Follow-up provider: BAKARI DIETZ   Reason/Clinical Findings weakness, severe pulmonary fibrosis   Describe mobility limitations that make leaving home difficult: weakness, severe pulmonary fibrosis   Nursing/Therapeutic Services Requested Skilled Nursing    Physical Therapy    Occupational Therapy   Skilled nursing orders: Medication education    COPD management    O2 instruction   PT orders: Gait Training    Strengthening    Home safety assessment   Weight Bearing Status As Tolerated   Occupational orders: Activities of daily living    Strengthening   Frequency: 1 Week 1            Source Order Set / Preference List    Preference List   AMB FACILITY REFERRALS [5073]           Clinical Indications     ICD-10-CM ICD-9-CM   Impaired mobility [Z74.09]    Z74.09 799.89   COPD with acute exacerbation    J44.1 491.21   Malignant neoplasm of upper lobe of right lung    C34.11 162.3                             Reprint Order Requisition    Ambulatory Referral to Home Health  ([facility]) (Order #[id number]) on 8/7/24         Encounter    View Encounter                Order Provider Info        Office phone Pager E-mail   Ordering User  Farida Santana APRN  [phone] -- --   Authorizing Provider  Farida Santana APRN  [phone] -- --   Attending Provider  Franny Montilla MD  [phone] -- --     Tracking Reports    Cosign Tracking Order Transmittal Tracking     Authorized by:  MARITZA Johnson  (NPI: [id number])                Lab Component SmartPhrase Guide    Ambulatory Referral to [facility] ([facility]) (Order #[id number]) on 8/7/24          History & Physical        Kaylie Garcia MD at 08/02/24 [facility]              [facility] Medicine Services  HISTORY AND PHYSICAL    Date of Admission: 8/2/2024  Primary Care Physician: Irving Alexis MD    Subjective   Primary Historian: The patient    Chief Complaint: Senning shortness of breath and leg swelling    Shortness of Breath    Leg Swelling      The patient is a pleasant 84-year-old male with a PMH significant for HTN, chronic respiratory failure baseline of 2.5 L as needed, HFpEF [Echo 7/3/2024 EF of 61 to 65% with left ventricular diastolic dysfunction and mild pulmonary HTN], recent DVT no AC in chart, GERD Hx of stage III CKD, Hx of lung adenocarcinoma, fibrotic lung disease who presented to the ED on 8/2/2024 on account of worsening shortness of breath requiring continuous use of oxygen for the past few days with associated leg swelling.  Patient states that the shortness of breath is worse with exertion and denies any chest pain, nausea, vomiting, loss of consciousness, fever or change in his urinary or bowel habits.  In the ED blood pressure was soft other vital signs were stable patient received IV Lasix 20 mg and says he is passing urine, labs troponin x 2 elevated, K5.4, , proBNP 295.5, BUN 30, CR 2.44, CBC unremarkable, respiratory panel negative and   Chest x-ray mass  in the upper midlung zone on the right larger compared to prior study measuring the range of about 4.8 cm, stable pulmonary fibrosis with cardiomegaly  Patient be admitted for HF exacerbation with acute on chronic respiratory failure.  Patient is full code at this time.      Review of Systems   Respiratory:  Positive for shortness of breath.       Otherwise complete ROS reviewed and negative except as mentioned in the HPI.    Past Medical History:   Past Medical History:   Diagnosis Date    Arthritis     Atrial fibrillation with rapid ventricular response 05/31/2019    Blindness of left eye     BPH with obstruction/lower urinary tract symptoms     Cancer     stomach & lung    CHF (congestive heart failure)     CKD (chronic kidney disease) stage 3, GFR 30-59 ml/min     Coronary artery disease     Elevated cholesterol     Essential hypertension 10/02/2017    Fibrotic lung diseases     GERD (gastroesophageal reflux disease)     Hearing loss     History of transfusion     Hydronephrosis of left kidney     Hyperlipidemia     Hypertension     pt was taken off of all of his medications for BP (atenolol, lisinopril, lasix) because his BP kept bottoming out so his primary dr told him to discontinue them 1-2 months ago (Jan/Feb 2019). pt states he takes no medications currently.    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 Patel is just going to keep an eye on it and not do surgery right now.    Mediastinal adenopathy 10/24/2018    Station [facility]    Monoclonal gammopathy of unknown significance (MGUS) 09/11/2018    Pancreatic mass     pt states he had this in 2013 but it went away on its own. Now recent CT shows it has come back so he is going to have an ultrasound on 3/13/19.    Shortness of breath      Past Surgical History:  Past Surgical History:   Procedure Laterality  Date    ABDOMINAL SURGERY      BRONCHOSCOPY N/A 10/24/2018    Procedure: BRONCHOSCOPY WITH BIOPSY, EBUS;  Surgeon: Gareth Becerra MD;  Location: [facility] OR;  Service: Pulmonary    CHOLECYSTECTOMY      COLONOSCOPY N/A 1/3/2019    Procedure: COLONOSCOPY WITH ANESTHESIA;  Surgeon: Randy Somers DO;  Location: [facility] ENDOSCOPY;  Service: Gastroenterology    CYSTOSCOPY, RETROGRADE PYELOGRAM AND STENT INSERTION Left 3/8/2019    Procedure: CYSTOSCOPY RETROGRADE BILATERAL PYELOGRAM;  Surgeon: Jos Sylvester MD;  Location: [facility] OR;  Service: Urology    ENDOSCOPY N/A 12/11/2018    Procedure: ESOPHAGOGASTRODUODENOSCOPY WITH ANESTHESIA;  Surgeon: Randy Somers DO;  Location:  [facility] ENDOSCOPY;  Service: Gastroenterology    ENDOSCOPY N/A 4/29/2019    Procedure: ESOPHAGOGASTRODUODENOSCOPY WITH ANESTHESIA;  Surgeon: Lilliam Jj MD;  Location: [facility] OR;  Service: Gastroenterology    ENDOSCOPY N/A 5/9/2019    Procedure: ESOPHAGOGASTRODUODENOSCOPY WITH ANESTHESIA;  Surgeon: Pilo Bansal MD;  Location: [facility] ENDOSCOPY;  Service: Gastroenterology    EYE SURGERY Left 1964    FOOT SURGERY Right 1966    joint    FRACTURE SURGERY       Social History:  reports that he quit smoking about 20 years ago. His smoking use included cigarettes. He started smoking about 69 years ago. He quit smokeless tobacco use about 54 years ago.  His smokeless tobacco use included chew. He reports that he does not drink alcohol and does not use drugs.    Family History: family history includes Hypertension in his mother; Leukemia in his father.       Allergies:  Allergies   Allergen Reactions    Penicillins Hives       Medications:  Prior to Admission medications    Medication Sig Start Date End Date Taking? Authorizing Provider   acetaminophen (TYLENOL) 500 MG tablet Take 2 tablets by mouth Every Night.    ProviderEliecer MD   allopurinol (ZYLOPRIM) 100 MG tablet Take 1 tablet by mouth Daily.    ProviderEliecer MD  "  aspirin 81 MG EC tablet Take 1 tablet by mouth Daily. 7/4/24   Zachary Lloyd MD   atorvastatin (LIPITOR) 20 MG tablet Take 1 tablet by mouth Every Night. 7/3/24   Zachary Lloyd MD   dronedarone (MULTAQ) 400 MG tablet Take 1 tablet by mouth Every 12 (Twelve) Hours. 7/4/24   Zachary Lloyd MD   fluticasone (FLONASE) 50 MCG/ACT nasal spray 1 spray into the nostril(s) as directed by provider Daily.    Eliecer Schultz MD   isosorbide mononitrate (IMDUR) 30 MG 24 hr tablet Take 1 tablet by mouth Daily. 7/4/24   Zachary Lloyd MD   losartan (COZAAR) 50 MG tablet Take 1 tablet by mouth Daily.    Eliecer Schultz MD   meclizine (ANTIVERT) 12.5 MG tablet Take 1 tablet by mouth 3 (Three) Times a Day As Needed for Dizziness.    Eliecer Schultz MD   melatonin 5 MG tablet tablet Take 2 tablets by mouth Every Night.    Eliecer Schultz MD   metoprolol succinate XL (Toprol XL) 50 MG 24 hr tablet Take 1 tablet by mouth Daily. 7/4/24   Zachary Lloyd MD   montelukast (SINGULAIR) 10 MG tablet Take 1 tablet by mouth Every Night.    Eliecer Schultz MD   multivitamin with minerals tablet tablet Take 1 tablet by mouth Daily.    Eliecer Schultz MD   pantoprazole (PROTONIX) 40 MG EC tablet Take 1 tablet by mouth Daily. 4/30/19   Hang Reddy DO   sodium bicarbonate 650 MG tablet Take 1 tablet by mouth 3 (Three) Times a Day.    Eliecer Schultz MD   tamsulosin (FLOMAX) 0.4 MG capsule 24 hr capsule Take 1 capsule by mouth Every Night.    Eliecer Schultz MD     I have utilized all available immediate resources to obtain, update, or review the patient's current medications (including all prescriptions, over-the-counter products, herbals, cannabis/cannabidiol products, and vitamin/mineral/dietary (nutritional) supplements).    Objective     Vital Signs: BP 97/65   Pulse 81   Temp 98.1 °F (36.7 °C) (Oral)   Resp 16   Ht 162.6 cm (64.02\")   Wt 77.6 kg (171 lb)   SpO2 95%   BMI " 29.34 kg/m²   Physical Exam  Constitutional:       Appearance: Normal appearance. He is normal weight.   HENT:      Head: Normocephalic.      Ears:      Comments: Hard of hearing     Nose: Nose normal.      Mouth/Throat:      Mouth: Mucous membranes are moist.   Eyes:      Pupils: Pupils are equal, round, and reactive to light.   Cardiovascular:      Rate and Rhythm: Normal rate and regular rhythm.      Pulses: Normal pulses.   Pulmonary:      Effort: Pulmonary effort is normal.   Abdominal:      General: Bowel sounds are normal.      Palpations: Abdomen is soft.   Musculoskeletal:         General: Normal range of motion.      Cervical back: Neck supple.      Right lower leg: Edema present.      Left lower leg: Edema present.   Skin:     General: Skin is warm and dry.      Findings: Bruising present.   Neurological:      General: No focal deficit present.      Mental Status: He is alert and oriented to person, place, and time.   Psychiatric:         Mood and Affect: Mood normal.        Results Reviewed:  Lab Results (last 24 hours)       Procedure Component Value Units Date/Time    High Sensitivity Troponin T 2Hr [[id number]]  (Abnormal) Collected: 08/02/24 1806    Specimen: Blood Updated: 08/02/24 1846     HS Troponin T 59 ng/L      Troponin T Delta -4 ng/L     Narrative:      High Sensitive Troponin T Reference Range:  <14.0 ng/L- Negative Female for AMI  <22.0 ng/L- Negative Male for AMI  >=14 - Abnormal Female indicating possible myocardial injury.  >=22 - Abnormal Male indicating possible myocardial injury.   Clinicians would have to utilize clinical acumen, EKG, Troponin, and serial changes to determine if it is an Acute Myocardial Infarction or myocardial injury due to an underlying chronic condition.         Respiratory Panel PCR w/COVID-19(SARS-CoV-2) JORDYN/MATILDE/[facility]YAN/[facility] In-House, NP Swab in UTM/VTM, 2 HR TAT - Swab, Nasopharynx [[id number]]  (Normal) Collected: 08/02/24 1610    Specimen: Swab from  Nasopharynx Updated: 08/02/24 1711     ADENOVIRUS, PCR Not Detected     Coronavirus 229E Not Detected     Coronavirus HKU1 Not Detected     Coronavirus NL63 Not Detected     Coronavirus OC43 Not Detected     COVID19 Not Detected     Human Metapneumovirus Not Detected     Human Rhinovirus/Enterovirus Not Detected     Influenza A PCR Not Detected     Influenza B PCR Not Detected     Parainfluenza Virus 1 Not Detected     Parainfluenza Virus 2 Not Detected     Parainfluenza Virus 3 Not Detected     Parainfluenza Virus 4 Not Detected     RSV, PCR Not Detected     Bordetella pertussis pcr Not Detected     Bordetella parapertussis PCR Not Detected     Chlamydophila pneumoniae PCR Not Detected     Mycoplasma pneumo by PCR Not Detected    Narrative:      In the setting of a positive respiratory panel with a viral infection PLUS a negative procalcitonin without other underlying concern for bacterial infection, consider observing off antibiotics or discontinuation of antibiotics and continue supportive care. If the respiratory panel is positive for atypical bacterial infection (Bordetella pertussis, Chlamydophila pneumoniae, or Mycoplasma pneumoniae), consider antibiotic de-escalation to target atypical bacterial infection.    Comprehensive Metabolic Panel [[id number]]  (Abnormal) Collected: 08/02/24 1604    Specimen: Blood Updated: 08/02/24 1649     Glucose 126 mg/dL      BUN 30 mg/dL      Creatinine 2.44 mg/dL      Sodium 139 mmol/L      Potassium 5.4 mmol/L      Comment: Specimen hemolyzed.  Result may be falsely elevated.        Chloride 105 mmol/L      CO2 23.0 mmol/L      Calcium 9.6 mg/dL      Total Protein 6.7 g/dL      Albumin 3.5 g/dL      ALT (SGPT) 26 U/L      Comment: Specimen hemolyzed.  Result may  be falsely elevated.        AST (SGOT) 42 U/L      Comment: Specimen hemolyzed.  Result may be falsely elevated.        Alkaline Phosphatase 132 U/L      Total Bilirubin 0.4 mg/dL      Globulin 3.2 gm/dL      A/G  Ratio 1.1 g/dL      BUN/Creatinine Ratio 12.3     Anion Gap 11.0 mmol/L      eGFR 25.8 mL/min/1.73     Narrative:      GFR Normal >60  Chronic Kidney Disease <60  Kidney Failure <15    The GFR formula is only valid for adults with stable renal function between ages 18 and 70.    High Sensitivity Troponin T [[id number]]  (Abnormal) Collected: 08/02/24 1604    Specimen: Blood Updated: 08/02/24 1649     HS Troponin T 63 ng/L      Comment: Specimen hemolyzed.  Results may be falsely decreased.       Narrative:      High Sensitive Troponin T Reference Range:  <14.0 ng/L- Negative Female for AMI  <22.0 ng/L- Negative Male for AMI  >=14 - Abnormal Female indicating possible myocardial injury.  >=22 - Abnormal Male indicating possible myocardial injury.   Clinicians would have to utilize clinical acumen, EKG, Troponin, and serial changes to determine if it is an Acute Myocardial Infarction or myocardial injury due to an underlying chronic condition.         BNP [[id number]]  (Normal) Collected: 08/02/24 1604    Specimen: Blood Updated: 08/02/24 1646     proBNP 295.5 pg/mL     Narrative:      This assay is used as an aid in the diagnosis of individuals suspected of having heart failure. It can be used as an aid in the diagnosis of acute decompensated heart failure (ADHF) in patients presenting with signs and symptoms of ADHF to the emergency department (ED). In addition, NT-proBNP of <300 pg/mL indicates ADHF is not likely.    Age Range Result Interpretation  NT-proBNP Concentration (pg/mL:      <50             Positive            >450                   Gray                 300-450                    Negative             <300    50-75           Positive            >900                  Gray                300-900                  Negative            <300      >75             Positive            >1800                  Gray                300-1800                  Negative            <300    Lactic Acid, Plasma [[id number]]   (Normal) Collected: 08/02/24 1608    Specimen: Blood Updated: 08/02/24 1646     Lactate 1.4 mmol/L     CBC & Differential [[id number]]  (Abnormal) Collected: 08/02/24 1604    Specimen: Blood Updated: 08/02/24 1630    Narrative:      The following orders were created for panel order CBC & Differential.  Procedure                               Abnormality         Status                     ---------                               -----------         ------                     CBC Auto Differential[[id number]]        Abnormal            Final result                 Please view results for these tests on the individual orders.    CBC Auto Differential [[id number]]  (Abnormal) Collected: 08/02/24 1604    Specimen: Blood Updated: 08/02/24 1630     WBC 5.71 10*3/mm3      RBC 3.53 10*6/mm3      Hemoglobin 11.0 g/dL      Hematocrit 35.5 %      .6 fL      MCH 31.2 pg      MCHC 31.0 g/dL      RDW 17.2 %      RDW-SD 62.9 fl      MPV 11.4 fL      Platelets 203 10*3/mm3      Neutrophil % 68.5 %      Lymphocyte % 15.9 %      Monocyte % 11.7 %      Eosinophil % 2.3 %      Basophil % 0.5 %      Immature Grans % 1.1 %      Neutrophils, Absolute 3.91 10*3/mm3      Lymphocytes, Absolute 0.91 10*3/mm3      Monocytes, Absolute 0.67 10*3/mm3      Eosinophils, Absolute 0.13 10*3/mm3      Basophils, Absolute 0.03 10*3/mm3      Immature Grans, Absolute 0.06 10*3/mm3      nRBC 0.0 /100 WBC           Imaging Results (Last 24 Hours)       Procedure Component Value Units Date/Time    XR Chest 1 View [[id number]] Collected: 08/02/24 1618     Updated: 08/02/24 1626    Narrative:      EXAMINATION:  XR CHEST 1 VW-  8/2/2024 3:16 PM     HISTORY: Shortness of air. Lung cancer.     COMPARISON: 6/29/2024.     TECHNIQUE: Single view AP image.     FINDINGS: A mass in the upper to midlung zone on the right appears  increased in size measuring approximately 4.8 cm. Fibrotic lung changes  appear relatively stable. Pleural thickening/effusion on the  right  appears relatively stable. There is cardiomegaly. The thoracic aorta is  atheromatous.          Impression:      1. A mass in the upper to midlung zone on the right is larger compared  to the prior study measuring in the range of about 4.8 cm.  2. Relatively stable pulmonary fibrosis. Pleural thickening/effusion on  the right also appears relatively stable.  3. Cardiomegaly.        The full report of this exam was immediately signed and available to the  emergency room. The patient is currently in the emergency room.           This report was signed and finalized on 8/2/2024 4:23 PM by Dr. Ramos Lagos MD.             I have personally reviewed and interpreted the radiology studies and ECG obtained at time of admission.     Assessment / Plan   Assessment:   Active Hospital Problems    Diagnosis     **Bilateral lower extremity edema        Treatment Plan  The patient will be admitted to my service here at [facility].   - HFpEF exacerbation with bilateral lower extremity edema continue strict I's and O's, daily weight, continue IV Lasix 20 mg twice daily with fluid restriction if no improvement consider cardiology consult  - Acute on chronic respiratory failure possibly secondary to HFpEF in a background of fibrotic lung disease continue supplemental O2 with weaning parameters, continue pulse oximetry monitoring, patient with recent DVT  - Elevated troponin possibly secondary to HFpEF exacerbation with patient denying chest pain, continue as needed EKG for chest pain with telemetry monitoring  - Hyperkalemia K of 5.4 continue p.o. Lokelma 10 mg once and monitor serum K  - CKD stage IV continue strict I's and O's, avoid nephrotoxins and renal dose medications when possible patient will benefit from nephrology evaluation possibly outpatient  - Hx of recent DVT states he is on anticoagulation but not seen on his home medications will request pharmacy to verify  - Increasing lung mass on x-ray will  consult oncology whom patient follows with already      Medical Decision Making  Number and Complexity of problems: 2  Differential Diagnosis: As above    Conditions and Status        Condition is unchanged.     MDM Data  External documents reviewed: Not indicated  Cardiac tracing (EKG, telemetry) interpretation: Reviewed  Radiology interpretation: Reviewed  Labs reviewed: Yes  Any tests that were considered but not ordered: No        Discussed with: The patient     Care Planning  Shared decision making: Patient and nursing staff  Code status and discussions: Full code    Disposition  Social Determinants of Health that impact treatment or disposition: Hopefully home   Estimated length of stay is 2 to 3 days.     I confirmed that the patient's advanced care plan is present, code status is documented, and a surrogate decision maker is listed in the patient's medical record.       The patient was seen and examined by me on 8/2/2024 at 1940.    Electronically signed by Kaylie Garcia MD, 08/02/24, 19:51 CDT.              Electronically signed by Kaylie Garcia MD at 08/02/24 2119          Physician Progress Notes (most recent note)        , MARITZA Iqbal at 08/06/24 1056       Attestation signed by Franny Montilla MD at 08/06/24 1213    I have reviewed this documentation and agree.                  Patient Name: Karoline Prater  Date of Admission: 8/2/2024  Today's Date: 08/06/24  Length of Stay: 1  Primary Care Physician: Irving Alexis MD    Subjective   Chief Complaint: Increasing shortness of breath  Shortness of Breath  Associated symptoms include leg swelling and wheezing. Pertinent negatives include no abdominal pain, chest pain or rhinorrhea.   Leg Swelling  Associated symptoms include coughing, fatigue and weakness. Pertinent negatives include no abdominal pain, chest pain, congestion or nausea.      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  Patient presented to [facility] emergency department on 8/2/2024 with complaints of shortness of breath requiring continuous use of his as needed oxygenation with associated leg swelling.  Patient states his shortness of breath is worse with exertion, denies any chest pain, nausea, vomiting, loss of consciousness, fever or change in his urinary or bowel habits.  On admission patient's blood pressure was soft, other vital signs were stable.  Patient received IV Lasix 20 mg and states he is passing urine.  ED lab results troponin x 2 mildly elevated, potassium of 5.4, , proBNP of 295.5, BUN of 30, creatinine of 2.44, CBC unremarkable, respiratory panel negative and chest x-ray mass in the upper midlung zone on the right larger compared to prior study measuring the range of about 4.8 cm, stable pulmonary fibrosis with cardiomegaly.  Patient was admitted for continued observation and treatment    Today:  Today patient is resting comfortably in bed and as always extremely pleasant.  Patient is alert and oriented and able to participate in assessment.  Patient remains stable on 2 L of oxygen.  Walking oximetry was performed today per RT with recommendations for 2 L at rest and 3 L with exertion.  Patient's coloring and demeanor continue to improve every day.  Patient states he was able to walk up to the nurses station and back on his oxygen with greatly improved exertional dyspnea.  He again states that he feels that he will be safe at home with his brother nearby and that he has all the DME required for home.  Discussed the case with Nimisha SALAS, pulmonology who stated that Dr. Crocker would like the patient to begin p.o. prednisone tomorrow and if continued  improvement will be able to discharge home with close follow-up.  All patient's questions were answered to the best of my ability and he continues to be in agreement for palliative care discussions, pulmonary and oncology consultation.        TREATMENT SUMMARY  Keytruda, carboplatin, Alimta initiated 4/18/2019 to be given every 3 weeks for 4 cycles - 4th cycle 7/5/2019, then Keytruda + Alimta as maintenance to continue indefinitely until progression or intolerable side effects   Fahad received cycle #25 of Keytruda/Alimta on 10/29/2020. He was then placed on an indefinite chemotherapy holiday on 12/30/2020  SBRT by Dr. Danny Cool was delivered in 5 treatment fractions for a total dose of 5000 cGy. XRT was initiated 1/24/2024 and was completed on 2/7/2024 08/05/24  0451 08/06/24  0454   Weight: 75.7 kg (166 lb 12.8 oz) 75.8 kg (167 lb)        Review of Systems   Constitutional:  Positive for fatigue.   HENT:  Negative for congestion, rhinorrhea and trouble swallowing.    Eyes: Negative.    Respiratory:  Positive for cough, shortness of breath and wheezing.    Cardiovascular:  Positive for leg swelling. Negative for chest pain.   Gastrointestinal:  Negative for abdominal distention, abdominal pain, diarrhea and nausea.   Genitourinary:  Negative for difficulty urinating and flank pain.   Musculoskeletal: Negative.    Skin:  Positive for pallor.   Allergic/Immunologic: Negative.    Neurological:  Positive for weakness. Negative for dizziness and syncope.   Hematological: Negative.    Psychiatric/Behavioral: Negative.        All pertinent negatives and positives are as above. All other systems have been reviewed and are negative unless otherwise stated.     Objective    Temp:  [97.3 °F (36.3 °C)-98.1 °F (36.7 °C)] 97.5 °F (36.4 °C)  Heart Rate:  [70-91] 77  Resp:  [16-20] 16  BP: (120-153)/(61-85) 129/65  Physical Exam  Vitals and nursing note reviewed.   Constitutional:       Appearance: He is  ill-appearing.      Comments: Patient is resting comfortably in bed on 2.5 L with oxygen saturation at 92%.  No family at bedside   HENT:      Head: Normocephalic.      Right Ear: Tympanic membrane normal.      Left Ear: Tympanic membrane normal.      Nose: Nose normal.      Mouth/Throat:      Mouth: Mucous membranes are moist.      Pharynx: Oropharynx is clear.   Eyes:      Pupils: Pupils are equal, round, and reactive to light.   Cardiovascular:      Rate and Rhythm: Normal rate and regular rhythm.      Pulses: Normal pulses.      Heart sounds: Normal heart sounds.   Pulmonary:      Effort: Accessory muscle usage present. No tachypnea or respiratory distress.      Breath sounds: Examination of the left-upper field reveals wheezing. Examination of the left-middle field reveals wheezing. Examination of the right-lower field reveals wheezing. Wheezing and rhonchi present.      Comments: Significant exertional dyspnea  Chest:      Chest wall: No tenderness.   Abdominal:      General: Abdomen is flat.      Palpations: Abdomen is soft.   Genitourinary:     Comments: Voiding per urinal  Musculoskeletal:      Cervical back: Normal range of motion.      Right lower leg: No edema.      Left lower leg: Edema present.      Comments: Generalized weakness   Skin:     General: Skin is warm.      Capillary Refill: Capillary refill takes less than 2 seconds.      Coloration: Skin is pale.   Neurological:      General: No focal deficit present.      Mental Status: He is alert and oriented to person, place, and time.   Psychiatric:         Mood and Affect: Mood normal.         Behavior: Behavior normal.         Thought Content: Thought content normal.         Judgment: Judgment normal.         Results Review:  I have reviewed the labs, radiology results, and diagnostic studies.    Laboratory Data:   Results from last 7 days   Lab Units 08/06/24  0352 08/05/24  0249 08/04/24  0412   WBC 10*3/mm3 8.67 9.70 6.57   HEMOGLOBIN g/dL 11.8*  "11.7* 11.6*   HEMATOCRIT % 38.7 38.4 37.3*   PLATELETS 10*3/mm3 215 223 218        Results from last 7 days   Lab Units 08/06/24  0352 08/05/24  1517 08/05/24  0249 08/04/24  1545 08/04/24  0412 08/03/24  0335 08/02/24  1604   SODIUM mmol/L 141  --  139  --  139   < > 139   POTASSIUM mmol/L 4.9 4.6 5.3*   < > 5.3*   < > 5.4*   CHLORIDE mmol/L 104  --  104  --  104   < > 105   CO2 mmol/L 29.0  --  25.0  --  24.0   < > 23.0   BUN mg/dL 39*  --  36*  --  33*   < > 30*   CREATININE mg/dL 2.19*  --  2.39*  --  2.44*   < > 2.44*   CALCIUM mg/dL 10.2  --  10.1  --  10.2   < > 9.6   BILIRUBIN mg/dL  --   --   --   --   --   --  0.4   ALK PHOS U/L  --   --   --   --   --   --  132*   ALT (SGPT) U/L  --   --   --   --   --   --  26   AST (SGOT) U/L  --   --   --   --   --   --  42*   GLUCOSE mg/dL 152*  --  148*  --  170*   < > 126*    < > = values in this interval not displayed.       Culture Data:   No results found for: \"BLOODCX\", \"URINECX\", \"WOUNDCX\", \"MRSACX\", \"RESPCX\", \"STOOLCX\"    Microbiology Results (last 10 days)       Procedure Component Value - Date/Time    Respiratory Panel PCR w/COVID-19(SARS-CoV-2) JORDYN/MATILDE/BRYAN/PAD/COR/ASHER In-House, NP Swab in [facility], 2 HR TAT - Swab, Nasopharynx [[id number]]  (Normal) Collected: 08/02/24 1610    Lab Status: Final result Specimen: Swab from Nasopharynx Updated: 08/02/24 1711     ADENOVIRUS, PCR Not Detected     Coronavirus 229E Not Detected     Coronavirus HKU1 Not Detected     Coronavirus NL63 Not Detected     Coronavirus OC43 Not Detected     COVID19 Not Detected     Human Metapneumovirus Not Detected     Human Rhinovirus/Enterovirus Not Detected     Influenza A PCR Not Detected     Influenza B PCR Not Detected     Parainfluenza Virus 1 Not Detected     Parainfluenza Virus 2 Not Detected     Parainfluenza Virus 3 Not Detected     Parainfluenza Virus 4 Not Detected     RSV, PCR Not Detected     Bordetella pertussis pcr Not Detected     Bordetella parapertussis PCR Not Detected "     Chlamydophila pneumoniae PCR Not Detected     Mycoplasma pneumo by PCR Not Detected    Narrative:      In the setting of a positive respiratory panel with a viral infection PLUS a negative procalcitonin without other underlying concern for bacterial infection, consider observing off antibiotics or discontinuation of antibiotics and continue supportive care. If the respiratory panel is positive for atypical bacterial infection (Bordetella pertussis, Chlamydophila pneumoniae, or Mycoplasma pneumoniae), consider antibiotic de-escalation to target atypical bacterial infection.             Radiology Data:   Imaging Results (Last 24 Hours)       Procedure Component Value Units Date/Time    US Venous Doppler Lower Extremity Bilateral (duplex) [[id number]] Collected: 08/05/24 1456     Updated: 08/05/24 1500    Narrative:      History: Swelling       Impression:      Impression: There is no evidence of deep venous thrombosis or  superficial thrombophlebitis of right or left lower extremities.     Comments: Bilateral lower extremity venous duplex exam was performed  using color Doppler flow, Doppler waveform analysis, and grayscale  imaging, with and without compression. There is no evidence of deep  venous thrombosis in the common femoral, superficial femoral, popliteal,  peroneal, anterior tibial, and posterior tibial veins bilaterally. No  thrombus is identified in the saphenofemoral junctions and greater  saphenous veins bilaterally.            This report was signed and finalized on 8/5/2024 2:56 PM by Dr. Sidney Connors MD.               I have reviewed the patient's current medications.     albuterol, 1.25 mg, Nebulization, 4x Daily - RT   And  ipratropium, 0.5 mg, Nebulization, 4x Daily - RT  aspirin, 81 mg, Oral, Daily  atorvastatin, 20 mg, Oral, Nightly  bumetanide, 0.5 mg, Oral, Daily  cetirizine, 10 mg, Oral, Daily  dronedarone, 400 mg, Oral, Q12H  enoxaparin, 30 mg, Subcutaneous, Q24H  fluticasone, 2 spray,  Each Nare, Daily  isosorbide mononitrate, 30 mg, Oral, Q24H  melatonin, 10 mg, Oral, Nightly  metoprolol succinate XL, 50 mg, Oral, Daily  montelukast, 10 mg, Oral, Nightly  multivitamin with minerals, 1 tablet, Oral, Daily  pantoprazole, 40 mg, Oral, Daily  [START ON 8/7/2024] predniSONE, 40 mg, Oral, Daily  sodium bicarbonate, 650 mg, Oral, TID  sodium chloride, 10 mL, Intravenous, Q12H         Intake/Output Summary (Last 24 hours) at 8/6/2024 1056  Last data filed at 8/6/2024 0946  Gross per 24 hour   Intake 1020 ml   Output 1650 ml   Net -630 ml        Assessment/Plan   Assessment  Active Hospital Problems    Diagnosis     **Acute on chronic respiratory failure with hypoxia     CHF (congestive heart failure)     Chronic diastolic congestive heart failure     COPD with acute exacerbation     S/P radiation > 12 weeks     Bilateral lower extremity edema     Former smoker     Hyperkalemia     Pulmonary fibrosis     Stage 4 chronic kidney disease     Malignant neoplasm of upper lobe of right lung        Treatment Plan    1. Acute on chronic respiratory failure with hypoxia-upon admission to the emergency department patient presented with his home 2 L of oxygen that he normally wears as needed.  Patient's oxygen saturation on room air was reported as 86%.  Continue  dyspnea on exertion, oxygen saturations dropped to 76% on 2 L and patient required 4 L of oxygen to maintain saturations greater than 90%.  Transition Solu-Medrol to p.o. prednisone.  Zyrtec, Flonase, and Singulair. Continue nebulizers, as needed ABGs incentive spirometry, supplemental oxygen with notification for provider for any sustained usage greater than 4 L.  Walking oximetry performed today with recommendations for 2 L at rest and 3 L with exertion, new prescription will be sent to his [employer].    2.  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  Chest x-ray performed on admission revealed a known mass followed by Dr. Michele and Dr. Cool to be increasing in size in the mid lung zone of on the right.  Patient received Keytruda, chemotherapy, and radiation which concluded with radiation in February 2024.  Due to new findings oncology consultation per Dr. Rico discussed the case with Dr. Cool and ordered a CT scan of the chest to be compared with previous.  CT scan revealed stable spiculated nodule within the medial right apex.  Masslike focus within the base of the right upper lobe shows increased size compared with 5 weeks ago.  Diffuse interstitial and reticulonodular disease within both lungs is more prominent now as compared with 5 weeks ago.  To be reviewed by oncology, palliative care discussion with request for patient to be changed to no CPR/limited support interventions no intubation no permanent feeding tube.  A MOST form was initiated and recommendations for a complete advance directive, which will be done with palliative .    2.  Bilateral lower extremity edema-patient has a history of chronic lower extremity edema on daily Lasix.  Patient initially presented with shortness of breath and 2+ pitting edema.  Continues to significantly improved, no edema present with improving erythema.  Continue p.o. Bumex and close monitoring of erythema due to history of cellulitis.    4.  Stage III chronic kidney disease-patient's initial presentation was BUN of 27 and creatinine of 2.46 with a GFR of 25.5.  Previous discharge creatinine at 2.34.  Today creatinine remained stable at 2.19 ,continue to review nephrotoxic agents and monitor with daily BMP.    5.  Hyperkalemia-chronic history of hyperkalemia, presented with potassium of 5.4 given Lokelma x 1.  After discontinued heparin and initiated low-dose Lovenox calcium was stable this a.m., continue to monitor with daily BMP    6.  Chronic diastolic heart failure with preserved  ejection fraction-last echocardiogram 7/3/2024 revealed ejection fraction of 60-65%.  BNP on admission was 295.  Patient has chronic complaints of bilateral lower extremity edema and shortness of breath.  Patient it is optimized on metoprolol, isosorbide, and losartan.  Continue strict intake and output, daily weights, heart failure education.    Due to patient's recent history of DVT, venous Doppler was performed without evidence of thrombus.  VTE prophylaxis Lovenox   Labs in a.m.    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     [facility] Data  External documents reviewed: None   cardiac tracing (EKG, telemetry) interpretation: None  Radiology interpretation: None  Labs reviewed:   8/6/2024 CBC, BMP reviewed repeat in a.m.  Any tests that were considered but not ordered: CTA of the chest, due to patient's CKD will leave it up to oncology     Decision rules/scores evaluated (example [id number], Wells, etc): [id number] score of 5     Discussed with: Nimisha Rey and patient     Care Planning  Shared decision making: Lorrie Rey and patient  code status and discussions: Changed to DNR/DNI   surrogate Decision Maker is son Beau or brother Keshav    Disposition  Social Determinants of Health that impact treatment or disposition: Lives at home alone and may require assistance with progressing lung cancer.  Palliative care and social work consult initiated  I  expect the patient to be discharged to home with home health in 1 days.     Electronically signed by MARITZA Johnson, 08/06/24, 10:56 CDT.     Electronically signed by Franny Montilla MD at 08/06/24 1213          Consult Notes (most recent note)        Grace Aly APRN at 08/05/24 0737        Consult Orders    1. Inpatient Palliative Care Consult [[id number]] ordered by Chon Rico MD at 08/03/24 1036                             [facility]iative [facility] Services    Palliative Care Initial Consult   Attending Physician: Franny Montilla MD  Referring Provider: Chon Rico MD    Reason for Referral: assistance with clarification of goals of care  Family/Support: brother and son    Code Status and Medical Interventions: CPR (Attempt to Resuscitate); Full Support   Ordered at: 08/02/24 1949     Level Of Support Discussed With:    Patient     Code Status (Patient has no pulse and is not breathing):    CPR (Attempt to Resuscitate)     Medical Interventions (Patient has pulse or is breathing):    Full Support     Goals of Care: TBD.    HPI:   84 y.o. 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 Michele and completed 4 cycles of combination chemotherapy with carboplatin, Keytruda, and Alimta, maintenance Keytruda and Alimta every 3 weeks with final cycle #25 on 10/29/2020, completed SBRT radiotherapy x 5 fractions for to right lung nodules 2/7/2024, Keytruda on hold pending active surveillance.  Additional health history positive for COPD, fibrotic lung disease, chronic respiratory failure-2.5 L baseline, HFpEF-EF 61 to 65% 7/3/2024, pulmonary hypertension-mild, iron deficiency anemia, arthritis, Atrial fibrillation, Blindness of left eye, BPH with obstruction/lower urinary tract symptoms, CKD, Coronary artery disease, DVT, hyperlipidemia, hypertension, GERD, Hearing  loss, Hydronephrosis of left kidney, IgG kappa MGUS, smoking history, impaired mobility-uses cane/walker, and Pancreatic mass (10/29/2003) s/p resection Klaudia-en-Y procedure and a choledochojejunostomy-biopsies negative for malignancy showing a fibrosed pancreas.  Recent hospitalization 6/29-7/4 due to chest pain, tachycardia, acute renal failure, refused home health/rehab and discharged home. Patient presented to [facility] on [facility]/2024 related to this of breath and leg swelling.  ED workup shows elevated troponin, creatinine 2.44, hyperkalemia, elevated alk phos, anemia.  Chest imaging shows a mass in the upper to mid lung zone on the right larger compared to prior study measuring in the range of about 4.8 cm.  Stable pulmonary fibrosis.  Pleural thickening/effusion on right also.  Stable.  Cardiomegaly.  Received IV diuretics in the ER and Lokelma.  Oncology consulted recommended CT chest and pulmonary consultation.  CT chest showed stable spiculated nodule within the medial right apex.  Masslike focus within the base of the right upper lobe shows increased size compared with 5 weeks ago.  Diffuse interstitial and reticulonodular disease within both lungs is more prominent now as compared with 5 weeks ago.  Pulmonology felt presentation due to COPD exacerbation and started on Symbicort, DuoNeb, Solu-Medrol, fluticasone, and Zyrtec in addition to supplemental oxygen.  Plan for outpatient PFT and sleep study when more stable.  Patient declined further therapy treatments and anticipate discharge home as prior.  Noted to have increase in oxygen needs with exertion.  Laying in bed without apparent distress.  Feels that breathing has improved.  Without complaint. Palliative Care Spoke With: patient significant decline in quality of life and functional status over the last several weeks secondary to respiratory issues.  States most recent hospitalization as result of chest discomfort requiring hospitalization  "and recently placed on oxygen support. Due to the Palliative Care Topics Discussed: palliative care, goals of care, care options, resuscitation status, [facility], and discharge options we will establish an advance care plan.   Advance Care Planning   Advance Care Planning Discussion: Spoke with patient in room with regard to events leading to current hospitalization and overall poor to guarded  long-term secondary to stage IV metastatic adenocarcinoma of the lung, COPD exacerbation, acute on chronic respiratory failure, chronic kidney disease stage IV, pulmonary fibrosis, multiple comorbidities, and advanced age.  Discussed unfortunate progression of disease, his overall decline most recently, and likelihood of rehospitalizations given multiple comorbid factors.  Patient continues to live independently but states most recently relying on his brother for additional support.  He does have 1 son who resides in [place].  We explored previous conversations with regard to medical priorities and states his son is well acquainted with his medical wishes \"I would not want to be a vegetable.I don't want to live on machines\".  He has not completed an advance directive in the past.  As such, we explored medical priorities at length and patient has elected to de-escalate care measures to no CPR/limited support interventions, and no permanent feeding tube.  Based upon these goals we will plan to complete a MOST document.  Patient will benefit from completion of an advance directive and we will ask palliative  to follow-up for assistance with completion of documentation.  We further discussed discharge options and anticipating home as prior.  With concerns of declining performance status we further discussed potential need for nursing facility placement given limited caregiver support in the home setting and financial support for private pay caregivers. He admits that if that happens \"I would not wish to be a burden on " "my family\". He talks openly about his siblings that have passed away or that require 24/7 support either in a nursing facility or with family caregivers. We discussed options of best supportive care directed by hospice when rehospitalization and aggressive care interventions no longer desired. Questions encouraged and support given.     Review of Systems   Constitutional: Positive for malaise/fatigue.   Cardiovascular:  Positive for dyspnea on exertion.   Gastrointestinal:  Negative for nausea.   Genitourinary:  Negative for dysuria.   Neurological:  Positive for weakness.   Psychiatric/Behavioral:  The patient is not nervous/anxious.      Past Medical History:   Diagnosis Date    Arthritis     Atrial fibrillation with rapid ventricular response 05/31/2019    Blindness of left eye     BPH with obstruction/lower urinary tract symptoms     Cancer     stomach & lung    CHF (congestive heart failure)     CKD (chronic kidney disease) stage 3, GFR 30-59 ml/min     COPD with acute exacerbation 8/3/2024    Coronary artery disease     Elevated cholesterol     Essential hypertension 10/02/2017    Fibrotic lung diseases     GERD (gastroesophageal reflux disease)     Hearing loss     History of transfusion     Hydronephrosis of left kidney     Hyperlipidemia     Hypertension     pt was taken off of all of his medications for BP (atenolol, lisinopril, lasix) because his BP kept bottoming out so his primary dr told him to discontinue them 1-2 months ago (Jan/Feb 2019). pt states he takes no medications currently.    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 Patel is just going to keep an eye on it and not do surgery right now.    Mediastinal adenopathy 10/24/2018    [facility]    Monoclonal gammopathy of unknown significance (MGUS) 09/11/2018    Pancreatic mass     pt states " he had this in  but it went away on its own. Now recent CT shows it has come back so he is going to have an ultrasound on 3/13/19.    Shortness of breath      Past Surgical History:   Procedure Laterality Date    ABDOMINAL SURGERY      BRONCHOSCOPY N/A 10/24/2018    Procedure: BRONCHOSCOPY WITH BIOPSY, EBUS;  Surgeon: Gareth Becerra MD;  Location: [facility] OR;  Service: Pulmonary    CHOLECYSTECTOMY      COLONOSCOPY N/A 1/3/2019    Procedure: COLONOSCOPY WITH ANESTHESIA;  Surgeon: Randy Somers DO;  Location: [facility] ENDOSCOPY;  Service: Gastroenterology    CYSTOSCOPY, RETROGRADE PYELOGRAM AND STENT INSERTION Left 3/8/2019    Procedure: CYSTOSCOPY RETROGRADE BILATERAL PYELOGRAM;  Surgeon: Jos Sylvester MD;  Location: [facility] OR;  Service: Urology    ENDOSCOPY N/A 2018    Procedure: ESOPHAGOGASTRODUODENOSCOPY WITH ANESTHESIA;  Surgeon: Randy Somers DO;  Location: [facility] ENDOSCOPY;  Service: Gastroenterology    ENDOSCOPY N/A 2019    Procedure: ESOPHAGOGASTRODUODENOSCOPY WITH ANESTHESIA;  Surgeon: Lilliam Jj MD;  Location: [facility] OR;  Service: Gastroenterology    ENDOSCOPY N/A 2019    Procedure: ESOPHAGOGASTRODUODENOSCOPY WITH ANESTHESIA;  Surgeon: Pilo Bansal MD;  Location: [facility] ENDOSCOPY;  Service: Gastroenterology    EYE SURGERY Left 1964    FOOT SURGERY Right 1966    joint    FRACTURE SURGERY       Social History     Socioeconomic History    Marital status: Single   Tobacco Use    Smoking status: Former     Current packs/day: 0.00     Types: Cigarettes     Start date: 10/29/1954     Quit date: 10/29/2003     Years since quittin.7    Smokeless tobacco: Former     Types: Chew     Quit date:    Vaping Use    Vaping status: Never Used   Substance and Sexual Activity    Alcohol use: No    Drug use: No    Sexual activity: Defer         Current Facility-Administered Medications:     acetaminophen (TYLENOL) tablet 650 mg, 650 mg, Oral, Q4H PRN **OR** acetaminophen  (TYLENOL) 160 MG/5ML oral solution 650 mg, 650 mg, Oral, Q4H PRN **OR** acetaminophen (TYLENOL) suppository 650 mg, 650 mg, Rectal, Q4H PRN, Kaylie Garcia MD    albuterol (PROVENTIL) nebulizer solution 0.5% 2.5 mg/0.5mL, 1.25 mg, Nebulization, 4x Daily - RT, 1.25 mg at 08/05/24 0611 **AND** ipratropium (ATROVENT) nebulizer solution 0.5 mg, 0.5 mg, Nebulization, 4x Daily - RT, Kaylie Garcia MD, 0.5 mg at 08/05/24 0611    aspirin EC tablet 81 mg, 81 mg, Oral, Daily, Kaylie Garcia MD, 81 mg at 08/04/24 0835    atorvastatin (LIPITOR) tablet 20 mg, 20 mg, Oral, Nightly, Kaylie Garcia MD, 20 mg at 08/04/24 2032    sennosides-docusate (PERICOLACE) 8.6-50 MG per tablet 2 tablet, 2 tablet, Oral, BID PRN **AND** polyethylene glycol (MIRALAX) packet 17 g, 17 g, Oral, Daily PRN **AND** bisacodyl (DULCOLAX) EC tablet 5 mg, 5 mg, Oral, Daily PRN **AND** bisacodyl (DULCOLAX) suppository 10 mg, 10 mg, Rectal, Daily PRN, Kaylie Garica MD    bumetanide (BUMEX) tablet 0.5 mg, 0.5 mg, Oral, Daily, Farida Santana APRN    cetirizine (zyrTEC) tablet 10 mg, 10 mg, Oral, Daily, Tarik Crocker MD, 10 mg at 08/04/24 0835    dronedarone (MULTAQ) tablet 400 mg, 400 mg, Oral, Q12H, Kaylie Garcia MD, 400 mg at 08/04/24 2033    Enoxaparin Sodium (LOVENOX) syringe 30 mg, 30 mg, Subcutaneous, Q24H, , Farida, APRN    fluticasone (FLONASE) 50 MCG/ACT nasal spray 2 spray, 2 spray, Each Nare, Daily, Tarik Crocker MD, 2 spray at 08/04/24 0835    isosorbide mononitrate (IMDUR) 24 hr tablet 30 mg, 30 mg, Oral, Q24H, Kaylie Garcia MD, 30 mg at 08/04/24 0836    melatonin tablet 10 mg, 10 mg, Oral, Nightly, Kaylie Garcia MD, 10 mg at 08/04/24 2032    methylPREDNISolone sodium succinate (SOLU-Medrol) injection 40 mg, 40 mg, Intravenous, Q12H, Zachary Lloyd MD, 40 mg at 08/04/24 2032    metoprolol succinate XL (TOPROL-XL) 24 hr tablet 50 mg, 50 mg, Oral, Daily, Kaylie Garcia MD, 50 mg at 08/04/24 0836    montelukast  "(SINGULAIR) tablet 10 mg, 10 mg, Oral, Nightly, Kaylie Garcia MD, 10 mg at 08/04/24 2032    multivitamin with minerals 1 tablet, 1 tablet, Oral, Daily, Kaylie Garcia MD, 1 tablet at 08/04/24 0836    pantoprazole (PROTONIX) EC tablet 40 mg, 40 mg, Oral, Daily, Kaylie Garcia MD, 40 mg at 08/04/24 0843    sodium bicarbonate tablet 650 mg, 650 mg, Oral, TID, Kaylie Garcia MD, 650 mg at 08/04/24 2032    sodium chloride 0.9 % flush 10 mL, 10 mL, Intravenous, Q12H, Kaylie Garcia MD, 10 mL at 08/04/24 2033    sodium chloride 0.9 % flush 10 mL, 10 mL, Intravenous, PRN, Kaylie Garcia MD    sodium chloride 0.9 % infusion 40 mL, 40 mL, Intravenous, PRN, Kaylie Garcia MD    sodium zirconium cyclosilicate (LOKELMA) packet 10 g, 10 g, Oral, Once, , MARITZA Iqbal    Allergies   Allergen Reactions    Penicillins Hives     I have utilized all available immediate resources to obtain, update, or review the patient's current medications (including all prescriptions, over-the-counter products, herbals, cannabis/cannabidiol products, and vitamin/mineral/dietary (nutritional) supplements) for name, route of administration, type, dose and frequency.      Intake/Output Summary (Last 24 hours) at 8/5/2024 0737  Last data filed at 8/5/2024 0451  Gross per 24 hour   Intake 960 ml   Output 1150 ml   Net -190 ml       Physical Exam:    Diagnostics: Reviewed  /67 (BP Location: Left arm, Patient Position: Lying)   Pulse 76   Temp 97.5 °F (36.4 °C) (Oral)   Resp 16   Ht 162.6 cm (64.02\")   Wt 75.7 kg (166 lb 12.8 oz)   SpO2 98%   BMI 28.62 kg/m²     Vitals and nursing note reviewed.   Constitutional:       Appearance: Not in distress. Chronically ill-appearing.      Interventions: Nasal cannula in place.   Eyes:      Comments: Left eye blindness   HENT:      Head: Normocephalic.   Pulmonary:      Effort: Pulmonary effort is normal.   Cardiovascular:      Normal rate.   Musculoskeletal:      Cervical back: Neck " supple. Genitourinary:     Comments: No reported dysuria  Neurological:      Mental Status: Alert, oriented to person, place, and time and oriented to person, place and time.   Psychiatric:         Mood and Affect: Mood normal.         Cognition and Memory: Cognition normal.     Patient status: Disease state: Controlled with current treatments.  Current Functional status: Palliative Performance Scale Score: Performance 60% based on the following measures: Ambulation: Reduced, Activity and Evidence of Disease: Unable to do hobby or some work, significant evidence of disease, Self-Care: Occasional assist necessary,  Intake: Normal or reduced, LOC: Full or confusion   Baseline Functional status: Palliative Performance Scale Score: Performance 60% based on the following measures: Ambulation: Reduced, Activity and Evidence of Disease: Unable to do hobby or some work, significant evidence of disease, Self-Care: Occasional assist necessary,  Intake: Normal or reduced, LOC: Full or confusion   Baseline ECOG Status(3) Capable of limited self-care, confined to bed or chair > 50% of waking hours.  Nutritional status: Albumin 3.5 Body mass index is 28.62 kg/m².         Hospital Problem List      Acute on chronic respiratory failure with hypoxia    Malignant neoplasm of upper lobe of right lung    Stage 4 chronic kidney disease    Pulmonary fibrosis    Hyperkalemia    Former smoker    Bilateral lower extremity edema    Chronic diastolic congestive heart failure    COPD with acute exacerbation    S/P radiation > 12 weeks    Impression/Problem List:    Right upper lobe of lung adenocarcinoma 11/27/2018 followed by Dr. Michele  COPD with acute exacerbation  Acute on chronic respiratory failure with hypoxia, 2.5 L baseline  Chronic kidney disease stage IV  Pulmonary fibrosis, extensive per pulmonology possible radiation fibrosis  IgG kappa MGUS  History of Pancreatic mass (10/29/2003) s/p resection Klaudia-en-Y procedure and a  choledochojejunostomy-biopsies negative for malignancy showing a fibrosed pancreas    Former smoker  Hyperkalemia  Bilateral lower extremity edema  HFpEF-EF 61 to 65% 7/3/2024,  Pulmonary hypertension-mild  Iron deficiency anemia  Atrial fibrillation  Blindness of left eye  BPH   Coronary artery disease  DVT  Hyperlipidemia  Hypertension  GERD  Hearing loss  Smoking history  Impaired mobility-uses cane/walker   Advanced age    Recommendations/Plan:  1. plan: Goals of care include CODE STATUS no CPR/limited support interventions.    Family support: The patient receives support from his extended family..  Advance Directives: Advance Directive Status: Patient does not have advance directive   POA/Healthcare surrogate-son, Beau-next of kin.    2.  Palliative care encounter  - Prognosis is poor to guarded poor to guarded long-term prognosis secondary to stage IV metastatic adenocarcinoma of the lung, COPD exacerbation, acute on chronic respiratory failure, chronic kidney disease stage IV, pulmonary fibrosis, multiple comorbidities, and advanced age.   -Patient appears to have fair prognostic awareness.     -completed 4 cycles of combination chemotherapy with carboplatin, Keytruda, and Alimta, maintenance Keytruda and Alimta every 3 weeks with final cycle #25 on 10/29/2020, completed SBRT radiotherapy x 5 fractions for to right lung nodules 2/7/2024, Keytruda on hold pending active surveillance.      -Received IV diuretics in the ER and Lokelma.    -Oncology consulted recommended CT chest and pulmonary consultation.    -Pulmonology felt presentation due to COPD exacerbation and started on Symbicort, DuoNeb, Solu-Medrol, fluticasone, and Zyrtec in addition to supplemental oxygen.  Plan for outpatient PFT and sleep study when more stable.   -Patient declined further therapy treatments and anticipate discharge home as prior.  Noted to have increase in oxygen needs with exertion, plan walking oximetry in a.m.      8/5-CODE  STATUS changes no CPR/limited support interventions, no intubation, no permanent feeding tube.  -Will plan to complete a MOST document  -Would benefit from completion of advanced directive.  Palliative  to assist with completion of documentation as above.  -High risk for rehospitalization's.  Discussed progression of disease and best supportive care directed by hospice when rehospitalizations and aggressive care interventions no longer desired.  -Anticipating home as prior.      Thank you for this consult and allowing us to participate in patient's plan of care. Palliative Care Team will continue to follow patient.     30 minutes spent on advance care planning-discussing with patient and/or family, answering questions, providing some guidance about a plan and documentation of care, and coordinating care face to face.    MARITZA Benitez  8/5/2024  07:37 CDT               Electronically signed by Grace Aly APRN at 08/05/24 1249       [unfilled]  Discharge Summary    No notes of this type exist for this encounter.       Discharge Order (From admission, onward)       Start     Ordered    08/07/24 0641  Discharge patient  Once        Expected Discharge Date: 08/07/24   Discharge Disposition: Home-Health Care [facility] [place]   Physician of Record for Attribution - Please select from Treatment Team: ZHAO BOWIE [[id number]]   Review needed by CMO to determine Physician of Record: No      Question Answer Comment   Physician of Record for Attribution - Please select from Treatment Team ZHAO BOWIE    Review needed by CMO to determine Physician of Record No        08/07/24 0642

## 2024-08-07 NOTE — PROGRESS NOTES
[facility] [place] [facility]    Patient: Karoline Prater  1940   MR# [id number]   Acct# [id number]  08/07/24   09:59 CDT  Referring Provider: Franny Montilla MD    Chief Complaint: Shortness of breath  Interval history: He is seen awake and alert resting in bed.  Oxygen saturation remained stable on baseline oxygen at 2 L/min.  He tells me had an episode with his heart this morning.  He was hopeful to go home today.  Breathing has been stable with change to p.o. prednisone.  Afebrile.  Further recommendations per Dr. Crocker to follow.  Meds:  albuterol, 1.25 mg, Nebulization, 4x Daily - RT   And  ipratropium, 0.5 mg, Nebulization, 4x Daily - RT  aspirin, 81 mg, Oral, Daily  atorvastatin, 20 mg, Oral, Nightly  bumetanide, 0.5 mg, Oral, Daily  cetirizine, 10 mg, Oral, Daily  dronedarone, 400 mg, Oral, Q12H  enoxaparin, 30 mg, Subcutaneous, Q24H  fluticasone, 2 spray, Each Nare, Daily  isosorbide mononitrate, 30 mg, Oral, Q24H  melatonin, 10 mg, Oral, Nightly  metoprolol succinate XL, 50 mg, Oral, Daily  montelukast, 10 mg, Oral, Nightly  multivitamin with minerals, 1 tablet, Oral, Daily  pantoprazole, 40 mg, Oral, Daily  predniSONE, 40 mg, Oral, Daily  sodium bicarbonate, 650 mg, Oral, TID  sodium chloride, 10 mL, Intravenous, Q12H         Physical Exam:  SpO2 Percentage    08/07/24 0600 08/07/24 0748 08/07/24 0926   SpO2: 99% 93% 94%     Body mass index is 28.55 kg/m².   Temp:  [97.6 °F (36.4 °C)-98.5 °F (36.9 °C)] 97.8 °F (36.6 °C)  Heart Rate:  [] 78  Resp:  [12-20] 18  BP: (108-144)/(55-80) 114/55  Intake/Output Summary (Last 24 hours) at 8/7/2024 0959  Last data filed at 8/7/2024 0905  Gross per 24 hour   Intake 480 ml   Output 1550 ml   Net -1070 ml     Physical Exam  Vitals and nursing note reviewed.   Constitutional:       General: He is not in acute distress.     Comments: Very pleasant   HENT:      Head: Normocephalic.      Nose: Nose normal.   Eyes:       General: No scleral icterus.  Cardiovascular:      Rate and Rhythm: Normal rate.   Pulmonary:      Effort: No respiratory distress.      Breath sounds: Decreased breath sounds present.   Abdominal:      General: There is no distension.   Skin:     General: Skin is warm and dry.   Neurological:      Mental Status: He is alert. Mental status is at baseline.   Psychiatric:         Mood and Affect: Mood normal.         Behavior: Behavior normal. Behavior is cooperative.       Electronically signed by MARITZA Walters, 8/7/2024, 09:59 CDT      Physician substantive portion: medical decision making  Result Review  Laboratory Data:  Results from last 7 days   Lab Units 08/07/24  0333 08/06/24  0352 08/05/24  0249   WBC 10*3/mm3 10.09 8.67 9.70   HEMOGLOBIN g/dL 12.2* 11.8* 11.7*   PLATELETS 10*3/mm3 198 215 223     Results from last 7 days   Lab Units 08/07/24  0333 08/06/24  0352 08/05/24  1517 08/05/24  0249 08/03/24  0335 08/02/24  1608   SODIUM mmol/L 138 141  --  139   < >  --    POTASSIUM mmol/L 4.1 4.9 4.6 5.3*   < >  --    CO2 mmol/L 24.0 29.0  --  25.0   < >  --    BUN mg/dL 41* 39*  --  36*   < >  --    CREATININE mg/dL 1.95* 2.19*  --  2.39*   < >  --    LACTATE mmol/L  --   --   --   --   --  1.4    < > = values in this interval not displayed.         Microbiology Results (last 10 days)       Procedure Component Value - Date/Time    Respiratory Panel PCR w/COVID-19(SARS-CoV-2) JORDYN/MATILDE/BRYAN/PAD/COR/ASHER In-House, NP Swab in UTM/VTM, 2 HR TAT - Swab, Nasopharynx [[id number]]  (Normal) Collected: 08/02/24 1610    Lab Status: Final result Specimen: Swab from Nasopharynx Updated: 08/02/24 1711     ADENOVIRUS, PCR Not Detected     Coronavirus 229E Not Detected     Coronavirus HKU1 Not Detected     Coronavirus NL63 Not Detected     Coronavirus OC43 Not Detected     COVID19 Not Detected     Human Metapneumovirus Not Detected     Human Rhinovirus/Enterovirus Not Detected     Influenza A PCR Not Detected     Influenza B  PCR Not Detected     Parainfluenza Virus 1 Not Detected     Parainfluenza Virus 2 Not Detected     Parainfluenza Virus 3 Not Detected     Parainfluenza Virus 4 Not Detected     RSV, PCR Not Detected     Bordetella pertussis pcr Not Detected     Bordetella parapertussis PCR Not Detected     Chlamydophila pneumoniae PCR Not Detected     Mycoplasma pneumo by PCR Not Detected    Narrative:      In the setting of a positive respiratory panel with a viral infection PLUS a negative procalcitonin without other underlying concern for bacterial infection, consider observing off antibiotics or discontinuation of antibiotics and continue supportive care. If the respiratory panel is positive for atypical bacterial infection (Bordetella pertussis, Chlamydophila pneumoniae, or Mycoplasma pneumoniae), consider antibiotic de-escalation to target atypical bacterial infection.           Recent films:  XR Chest 1 View    Result Date: 8/7/2024  EXAMINATION: XR CHEST 1 VW-  8/7/2024 8:37 AM  HISTORY: CHF, pulmonary fibrosis; R60.0-Localized edema; R09.02-Hypoxemia; E87.70-Fluid overload, unspecified; Z74.09-Other reduced mobility; J44.1-Chronic obstructive pulmonary disease with (acute) exacerbation; C34.11-Malignant neoplasm of upper lobe, right bronchus or lung  1 view chest x-ray.  COMPARISON: 8/3/2024 chest CT. 8/2/2024 chest x-ray.  Heart size is unchanged. Aortic arch calcification is present. Unchanged RIGHT chest wall port.  Interstitial fibrosis.  Similar appearance of focal consolidation within the RIGHT upper lobe.  A known spiculated nodule within the RIGHT apex is not well visualized.  Persistent RIGHT basilar pleural thickening with blunting of the lateral costophrenic angle.  No pneumothorax.      Impression: 1. Interstitial fibrosis. 2. Similar appearance of focal consolidation within the RIGHT upper lobe.    This report was signed and finalized on 8/7/2024 10:09 AM by Dr. Danilo Sebastian MD.      Walking Oximetry    Result  Date: 8/6/2024  Mahnaz Santiago, CRT     8/6/2024  9:24 AM Exercise Oximetry Patient Name:Karoline Prater MRN: [id number] Date: 08/06/24         ROOM AIR BASELINE SpO2% 87 Heart Rate 86 Blood Pressure  EXERCISE ON ROOM AIR SpO2% EXERCISE ON O2 @ 2 LPM SpO2% 1 MINUTE  1 MINUTE  2 MINUTES  2 MINUTES 84 3 MINUTES  3 MINUTES Inc. To 3 lpm  4 MINUTES  4 MINUTES  5 MINUTES  5 MINUTES 94 6 MINUTES  6 MINUTES            Distance Walked   Distance Walked 80 ft. Dyspnea (Pina Scale)   Dyspnea (Pina Scale) 2 Fatigue (Pina Scale)   Fatigue (Pina Scale) SpO2% Post Exercise   SpO2% Post Exercise 92 HR Post Exercise   HR Post Exercise 163 Time to Recovery   Time to Recovery 2 minutes Comments: Pt. Walked 80 ft in hallway on 2 lpm. Sat fell to 84%. Pt. Felt short of air. Increased to 3 lpm. Pt. Recovered to 94% after 2 minutes. Walked pt. 100 ft and pt. Maintained O2 sat of 92%.    Personal review of imaging : CXR shows imaging studies reviewed.  It showed bilateral pulmonary fibrosis and focal consolidation in the right upper lobe and he also had right-sided lung mass.      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  History of tobacco use  Chronic respiratory failure and hypoxia on home oxygen  Hypertension  Hyperlipidemia  Chronic congestive diastolic heart failure with preserved ejection fraction  Atrial fibrillation  Thrombocytopenia and neutropenia/anemia requiring blood transfusion  Severe hearing impairment  Chronic kidney disease stage III  Allergic rhinitis    Recommend/plan:   Patient was seen in the follow-up visit in pulmonary rounds in medical floor today  He appears comfortable on 2 L oxygen at rest and 3 L during  activity.  His chest x-ray shows a questionable infiltrate on the right upper lobe  However he is afebrile.  He already has mild fibrosis and a right-sided lung mass which is noted in the imaging study  He is atrial fibrillation with rapid ventricular response is better controlled and heart rate is better today.  Hospitalist team is addressing this issue.  Currently on prophylactic dose of Lovenox.  Continue oral prednisone taper.  I anticipate he will be discharged in 1 to 2 days  Continue bronchodilator treatment.  Routine respiratory care  Diuresis with Bumex with monitoring of renal function will function abnormal but stable  Continue incentive spirometry and flutter valve.  DVT and stress ulcer prophylaxis.  Pain and anxiety control repeat labs imaging studies from time to time.  Oncology following  Nutritional support.  Physical activity as tolerated..  Chest x-ray for tomorrow morning  Code status: Full.  Overall prognosis: Guarded.  We will follow.  Plan for long-term follow-up in the pulmonary clinic in 4 to 6 weeks time.    Time spent by me: 35 min    This visit was performed by both a physician and an Advanced Practice RN.  I performed all aspects of the medical decision making as documented.    Electronically signed by     Tarik Crocker MD,  Pulmonologist/Intensivist   8/7/2024, 13:02 CDT

## 2024-08-07 NOTE — PLAN OF CARE
Goal Outcome Evaluation:           Progress: improving    Outcome Evaluation: Pt was S 70-85 on tele. Pt slept on and off through the night. Pt complianed of no pain. Pt sats within normal limits on 2L NC.

## 2024-08-07 NOTE — PROGRESS NOTES
[facility] Palliative Care Services    Palliative Care Daily Progress Note   Chief complaint-chart review    Code Status:   Code Status and Medical Interventions: No CPR (Do Not Attempt to Resuscitate); Limited Support; No intubation (DNI), Other; no permanent feeding tube   Ordered at: 08/05/24 1231     Medical Intervention Limits:    No intubation (DNI)    Other     Level Of Support Discussed With:    Patient     Code Status (Patient has no pulse and is not breathing):    No CPR (Do Not Attempt to Resuscitate)     Medical Interventions (Patient has pulse or is breathing):    Limited Support     Additional Medical Interventions Limits:    no permanent feeding tube      Advanced Directives: Advance Directive Status: Patient does not have advance directive   Goals of Care: Ongoing.     S: Medical record reviewed. Events noted.  Kidney function slowly improving.  Anemia stable.  Plans to discharge patient home today however developed atrial flutter in the 170s, given one-time dose of Lopressor and transition back to sinus rhythm.  Also noted to have urinary retention requiring In-N-Out cath and home dosing Flomax restarted.  Pulmonology plans for steroid taper and plan follow-up in outpatient clinic in 4 to 6 weeks.  Anticipating discharge home likely tomorrow.        Pain Assessment  Preferred Pain Scale: number (Numeric Rating Pain Scale)  Nonverbal Indicators of Pain: nonverbal indicators absent    O:     Intake/Output Summary (Last 24 hours) at 8/7/2024 1607  Last data filed at 8/7/2024 1300  Gross per 24 hour   Intake 480 ml   Output 1650 ml   Net -1170 ml       Diagnostics and current medications: Reviewed.      Current Facility-Administered Medications:     acetaminophen (TYLENOL) tablet 650 mg, 650 mg, Oral, Q4H PRN **OR** acetaminophen (TYLENOL) 160 MG/5ML oral solution 650 mg, 650 mg, Oral, Q4H PRN **OR** acetaminophen (TYLENOL) suppository 650 mg, 650 mg, Rectal, Q4H PRN, Radha,  MD Kaylie    albuterol (PROVENTIL) nebulizer solution 0.5% 2.5 mg/0.5mL, 1.25 mg, Nebulization, 4x Daily - RT, 1.25 mg at 08/07/24 1422 **AND** ipratropium (ATROVENT) nebulizer solution 0.5 mg, 0.5 mg, Nebulization, 4x Daily - RT, Kaylie Garcia MD, 0.5 mg at 08/07/24 1422    aspirin EC tablet 81 mg, 81 mg, Oral, Daily, Kaylie Garcia MD, 81 mg at 08/07/24 0951    atorvastatin (LIPITOR) tablet 20 mg, 20 mg, Oral, Nightly, Kaylie Garcia MD, 20 mg at 08/06/24 2216    sennosides-docusate (PERICOLACE) 8.6-50 MG per tablet 2 tablet, 2 tablet, Oral, BID PRN **AND** polyethylene glycol (MIRALAX) packet 17 g, 17 g, Oral, Daily PRN **AND** bisacodyl (DULCOLAX) EC tablet 5 mg, 5 mg, Oral, Daily PRN, 5 mg at 08/06/24 2220 **AND** bisacodyl (DULCOLAX) suppository 10 mg, 10 mg, Rectal, Daily PRN, Kaylie Garcia MD    bumetanide (BUMEX) tablet 0.5 mg, 0.5 mg, Oral, Daily, Farida Santana APRN, 0.5 mg at 08/07/24 0951    cetirizine (zyrTEC) tablet 10 mg, 10 mg, Oral, Daily, Tarik Crocker MD, 10 mg at 08/07/24 0951    dronedarone (MULTAQ) tablet 400 mg, 400 mg, Oral, Q12H, Kaylie Garcia MD, 400 mg at 08/07/24 0951    Enoxaparin Sodium (LOVENOX) syringe 30 mg, 30 mg, Subcutaneous, Q24H, Farida Santana APRN, 30 mg at 08/07/24 0952    fluticasone (FLONASE) 50 MCG/ACT nasal spray 2 spray, 2 spray, Each Nare, Daily, SensTarik doherty MD, 2 spray at 08/07/24 0952    isosorbide mononitrate (IMDUR) 24 hr tablet 30 mg, 30 mg, Oral, Q24H, Kaylie Garcia MD, 30 mg at 08/07/24 0951    melatonin tablet 10 mg, 10 mg, Oral, Nightly, Kaylie Garcia MD, 10 mg at 08/06/24 2216    metoprolol succinate XL (TOPROL-XL) 24 hr tablet 50 mg, 50 mg, Oral, Daily, Kaylie Garcia MD, 50 mg at 08/07/24 0951    montelukast (SINGULAIR) tablet 10 mg, 10 mg, Oral, Nightly, Kaylie Garcia MD, 10 mg at 08/06/24 2216    multivitamin with minerals 1 tablet, 1 tablet, Oral, Daily, Kaylie Garcia MD, 1 tablet at 08/07/24 0951    pantoprazole  "(PROTONIX) EC tablet 40 mg, 40 mg, Oral, Daily, Kaylie Garcia MD, 40 mg at 08/07/24 0951    predniSONE (DELTASONE) tablet 40 mg, 40 mg, Oral, Daily, Farida Santana APRN, 40 mg at 08/07/24 0951    sodium bicarbonate tablet 650 mg, 650 mg, Oral, TID, Kaylie Garcia MD, 650 mg at 08/07/24 0952    sodium chloride 0.9 % flush 10 mL, 10 mL, Intravenous, Q12H, Kaylie Garcia MD, 10 mL at 08/07/24 0952    sodium chloride 0.9 % flush 10 mL, 10 mL, Intravenous, PRN, Kaylie Garcia MD    sodium chloride 0.9 % infusion 40 mL, 40 mL, Intravenous, PRN, Kaylie Garcia MD    tamsulosin (FLOMAX) 24 hr capsule 0.4 mg, 0.4 mg, Oral, Daily, Farida Santana APRN, 0.4 mg at 08/07/24 1419    Allergies   Allergen Reactions    Penicillins Hives     I have utilized all available immediate resources to obtain, update, or review the patient's current medications (including all prescriptions, over-the-counter products, herbals, cannabis/cannabidiol products, and vitamin/mineral/dietary (nutritional) supplements) for name, route of administration, type, dose and frequency.    A:    /65 (BP Location: Right arm, Patient Position: Lying)   Pulse 74   Temp 98 °F (36.7 °C) (Oral)   Resp 18   Ht 162.6 cm (64.02\")   Wt 75.5 kg (166 lb 6.4 oz)   SpO2 94%   BMI 28.55 kg/m²      Patient status: Disease state: Controlled with current treatments.  Current Functional status: Palliative Performance Scale Score: Performance 60% based on the following measures: Ambulation: Reduced, Activity and Evidence of Disease: Unable to do hobby or some work, significant evidence of disease, Self-Care: Occasional assist necessary,  Intake: Normal or reduced, LOC: Full or confusion   Baseline Functional status: Palliative Performance Scale Score: Performance 60% based on the following measures: Ambulation: Reduced, Activity and Evidence of Disease: Unable to do hobby or some work, significant evidence of disease, Self-Care: Occasional assist " necessary,  Intake: Normal or reduced, LOC: Full or confusion   Baseline ECOG Status(3) Capable of limited self-care, confined to bed or chair > 50% of waking hours.  Nutritional status: Albumin 3.5 Body mass index is 28.62 kg/m².      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 plan: Goals of care include CODE STATUS no CPR/limited support interventions.     Family support: The patient receives support from his extended family..  Advance Directives: Advance Directive Status: Patient does not have advance directive   POA/Healthcare surrogate-son, Beau-next of kin.     2.  Palliative care encounter  - Prognosis is poor to guarded poor to guarded long-term prognosis secondary to stage IV metastatic adenocarcinoma of the lung, COPD exacerbation, acute on  chronic respiratory failure, chronic kidney disease stage IV, pulmonary fibrosis, multiple comorbidities, and advanced age.   -Patient appears to have fair prognostic awareness.      -completed 4 cycles of combination chemotherapy with carboplatin, Keytruda, and Alimta, maintenance Keytruda and Alimta every 3 weeks with final cycle #25 on 10/29/2020, completed SBRT radiotherapy x 5 fractions for to right lung nodules 2/7/2024, Keytruda on hold pending active surveillance.       -Received IV diuretics in the ER and Lokelma.    -Oncology consulted recommended CT chest and pulmonary consultation.    -Pulmonology felt presentation due to COPD exacerbation and started on Symbicort, DuoNeb, Solu-Medrol, fluticasone, and Zyrtec in addition to supplemental oxygen.  Plan for outpatient PFT and sleep study when more stable.   -Patient declined further therapy treatments and anticipate discharge home as prior.  Noted to have increase in oxygen needs with exertion, plan walking oximetry in a.m.       8/5-CODE STATUS changes no CPR/limited support interventions, no intubation, no permanent feeding tube.  -Will plan to complete a MOST document  -Would benefit from completion of advanced directive.  Palliative  to assist with completion of documentation as above.    8/7-continue supportive measures  -High risk for rehospitalization's.  Discussed progression of disease and best supportive care directed by hospice when rehospitalizations and aggressive care interventions no longer desired.  -Anticipating home as prior.  -Pulmonology plans steroid to taper, recommends outpatient follow-up  -A MOST document has been initiated, attending to complete      Thank you for allowing us to participate in patient's plan of care. Palliative Care Team will continue to follow patient as needed.     Grace Aly, APRN  8/7/2024  16:07 CDT

## 2024-08-07 NOTE — CASE MANAGEMENT/SOCIAL WORK
Continued Stay Note   [place]     Patient Name: Karoline Prater  MRN: [id number]  Today's Date: 8/7/2024    Admit Date: 8/2/2024    Plan: Home with [facility]   Discharge Plan       Row Name 08/07/24 0919       Plan    Plan Home with [facility]    Patient/Family in Agreement with Plan yes    Provided Post Acute Provider List? Yes    Post Acute Provider List Home Health    Delivered To Patient    Method of Delivery Telephone    Final Discharge Disposition Code 06 - home with home health care    Final Note Referral for .  Pt was provided options and chose [facility].  SW has faxed and informed Jayla at [facility].      Row Name 08/07/24 0908       Plan    Final Discharge Disposition Code 01 - home or self-care                   Discharge Codes    No documentation.                 Expected Discharge Date and Time       Expected Discharge Date Expected Discharge Time    Aug 7, 2024               ANDI Yeung

## 2024-08-07 NOTE — PROGRESS NOTES
Patient Name: Karoline Prater  Date of Admission: 8/2/2024  Today's Date: 08/07/24  Length of Stay: 2  Primary Care Physician: Irving Alexis MD    Subjective   Chief Complaint: Increasing shortness of breath  Shortness of Breath  Associated symptoms include leg swelling and wheezing. Pertinent negatives include no abdominal pain, chest pain or rhinorrhea.   Leg Swelling  Associated symptoms include coughing, fatigue and weakness. Pertinent negatives include no abdominal pain, chest pain, congestion or nausea.      HPI:  Vianey Salcido 84-year-old white male with past medical history of GERD, blindness of his left eye, hypertension, BPH, pancreatic cancer with metastasis to right lung, CHF, last EF was 61-65% with left ventricular diastolic dysfunction and mild pulmonary hypertension on 7/3/2024, CKD stage III, and fibrotic lung disease.  Patient presented to [facility] emergency department on 8/2/2024 with complaints of shortness of breath requiring continuous use of his as needed oxygenation with associated leg swelling.  Patient states his shortness of breath is worse with exertion, denies any chest pain, nausea, vomiting, loss of consciousness, fever or change in his urinary or bowel habits.  On admission patient's blood pressure was soft, other vital signs were stable.  Patient received IV Lasix 20 mg and states he is passing urine.  ED lab results troponin x 2 mildly elevated, potassium of 5.4, , proBNP of 295.5, BUN of 30, creatinine of 2.44, CBC unremarkable, respiratory panel negative and chest x-ray mass in the upper midlung zone on the right larger compared to prior study measuring the range of about 4.8 cm, stable pulmonary fibrosis with cardiomegaly.  Patient was admitted for continued observation and treatment    Today:  Patient is resting comfortably in bed and as always extremely pleasant.  Patient is alert and oriented and able to participate in assessment and charge planning.   Patient has remained stable on 2 L of oxygen at rest and 3 L with exertion.  Patient was able to ambulate to the nurses station and back yesterday without significant exertional dyspnea.  Patient was in the process of being discharged this morning when he developed atrial flutter in the 170s, patient has a chronic history of atrial fibs atrial flutter.  Patient was given a one-time dose of IV Lopressor 5 mg which transitioned him back to sinus rhythm.  Patient was monitored for approximately 4 hours and when I went to discharge the patient again he confessed that he had not been able to urinate properly since last night and he felt that he was not emptying his bladder properly.  Ladder scan performed by nursing revealed 981 cc, In-N-Out cath was performed and patient was restarted on his home Flomax that had been discontinued on admission due to hypotension.  I explained this to the patient and told him that he still should be for discharge tomorrow as his Flomax should work overnight and he should be able to urinate properly.  Patient verbalized understanding all questions were answered to the best my ability and patient is in agreement for discharge home tomorrow.    TREATMENT SUMMARY  Keytruda, carboplatin, Alimta initiated 4/18/2019 to be given every 3 weeks for 4 cycles - 4th cycle 7/5/2019, then Keytruda + Alimta as maintenance to continue indefinitely until progression or intolerable side effects   Fahad received cycle #25 of Keytruda/Alimta on 10/29/2020. He was then placed on an indefinite chemotherapy holiday on 12/30/2020  SBRT by Dr. Danny Cool was delivered in 5 treatment fractions for a total dose of 5000 cGy. XRT was initiated 1/24/2024 and was completed on 2/7/2024       Documented weights    08/02/24 1548 08/02/24 2000 08/03/24 0428 08/04/24 0455   Weight: 77.6 kg (171 lb) 77.7 kg (171 lb 6.4 oz) 77.1 kg (170 lb) 75.7 kg (166 lb 12.8 oz)    08/05/24 0451 08/06/24 0454 08/07/24 0453   Weight: 75.7  kg (166 lb 12.8 oz) 75.8 kg (167 lb) 75.5 kg (166 lb 6.4 oz)           Review of Systems   Constitutional:  Positive for fatigue.   HENT:  Negative for congestion, rhinorrhea and trouble swallowing.    Eyes: Negative.    Respiratory:  Positive for cough, shortness of breath and wheezing.    Cardiovascular:  Positive for leg swelling. Negative for chest pain.   Gastrointestinal:  Negative for abdominal distention, abdominal pain, diarrhea and nausea.   Genitourinary:  Negative for difficulty urinating and flank pain.   Musculoskeletal: Negative.    Skin:  Positive for pallor.   Allergic/Immunologic: Negative.    Neurological:  Positive for weakness. Negative for dizziness and syncope.   Hematological: Negative.    Psychiatric/Behavioral: Negative.        All pertinent negatives and positives are as above. All other systems have been reviewed and are negative unless otherwise stated.     Objective    Temp:  [97.8 °F (36.6 °C)-98.5 °F (36.9 °C)] 97.8 °F (36.6 °C)  Heart Rate:  [] 75  Resp:  [12-20] 18  BP: (108-144)/(55-80) 113/61  Physical Exam  Vitals and nursing note reviewed.   Constitutional:       Appearance: He is ill-appearing.      Comments: Patient is resting comfortably in bed on 2.5 L with oxygen saturation at 92%.  No family at bedside   HENT:      Head: Normocephalic.      Right Ear: Tympanic membrane normal.      Left Ear: Tympanic membrane normal.      Nose: Nose normal.      Mouth/Throat:      Mouth: Mucous membranes are moist.      Pharynx: Oropharynx is clear.   Eyes:      Pupils: Pupils are equal, round, and reactive to light.   Cardiovascular:      Rate and Rhythm: Normal rate and regular rhythm.      Pulses: Normal pulses.      Heart sounds: Normal heart sounds.   Pulmonary:      Effort: Accessory muscle usage present. No tachypnea or respiratory distress.      Breath sounds: Examination of the left-upper field reveals wheezing. Examination of the left-middle field reveals wheezing.  Examination of the right-lower field reveals wheezing. Wheezing and rhonchi present.      Comments: Significant exertional dyspnea  Chest:      Chest wall: No tenderness.   Abdominal:      General: Abdomen is flat.      Palpations: Abdomen is soft.   Genitourinary:     Comments: Voiding per urinal  Musculoskeletal:      Cervical back: Normal range of motion.      Right lower leg: No edema.      Left lower leg: Edema present.      Comments: Generalized weakness   Skin:     General: Skin is warm.      Capillary Refill: Capillary refill takes less than 2 seconds.      Coloration: Skin is pale.   Neurological:      General: No focal deficit present.      Mental Status: He is alert and oriented to person, place, and time.   Psychiatric:         Mood and Affect: Mood normal.         Behavior: Behavior normal.         Thought Content: Thought content normal.         Judgment: Judgment normal.         Results Review:  I have reviewed the labs, radiology results, and diagnostic studies.    Laboratory Data:   Results from last 7 days   Lab Units 08/07/24  0333 08/06/24  0352 08/05/24  0249   WBC 10*3/mm3 10.09 8.67 9.70   HEMOGLOBIN g/dL 12.2* 11.8* 11.7*   HEMATOCRIT % 39.2 38.7 38.4   PLATELETS 10*3/mm3 198 215 223        Results from last 7 days   Lab Units 08/07/24  0333 08/06/24  0352 08/05/24  1517 08/05/24  0249 08/03/24  0335 08/02/24  1604   SODIUM mmol/L 138 141  --  139   < > 139   POTASSIUM mmol/L 4.1 4.9 4.6 5.3*   < > 5.4*   CHLORIDE mmol/L 104 104  --  104   < > 105   CO2 mmol/L 24.0 29.0  --  25.0   < > 23.0   BUN mg/dL 41* 39*  --  36*   < > 30*   CREATININE mg/dL 1.95* 2.19*  --  2.39*   < > 2.44*   CALCIUM mg/dL 10.2 10.2  --  10.1   < > 9.6   BILIRUBIN mg/dL  --   --   --   --   --  0.4   ALK PHOS U/L  --   --   --   --   --  132*   ALT (SGPT) U/L  --   --   --   --   --  26   AST (SGOT) U/L  --   --   --   --   --  42*   GLUCOSE mg/dL 90 152*  --  148*   < > 126*    < > = values in this interval not  "displayed.       Culture Data:   No results found for: \"BLOODCX\", \"URINECX\", \"WOUNDCX\", \"MRSACX\", \"RESPCX\", \"STOOLCX\"    Microbiology Results (last 10 days)       Procedure Component Value - Date/Time    Respiratory Panel PCR w/COVID-19(SARS-CoV-2) JORDYN/MATILDE/BRYAN/PAD/COR/ASHER In-House, NP Swab in UTM/VTM, 2 HR TAT - Swab, Nasopharynx [[id number]]  (Normal) Collected: 08/02/24 1610    Lab Status: Final result Specimen: Swab from Nasopharynx Updated: 08/02/24 1711     ADENOVIRUS, PCR Not Detected     Coronavirus 229E Not Detected     Coronavirus HKU1 Not Detected     Coronavirus NL63 Not Detected     Coronavirus OC43 Not Detected     COVID19 Not Detected     Human Metapneumovirus Not Detected     Human Rhinovirus/Enterovirus Not Detected     Influenza A PCR Not Detected     Influenza B PCR Not Detected     Parainfluenza Virus 1 Not Detected     Parainfluenza Virus 2 Not Detected     Parainfluenza Virus 3 Not Detected     Parainfluenza Virus 4 Not Detected     RSV, PCR Not Detected     Bordetella pertussis pcr Not Detected     Bordetella parapertussis PCR Not Detected     Chlamydophila pneumoniae PCR Not Detected     Mycoplasma pneumo by PCR Not Detected    Narrative:      In the setting of a positive respiratory panel with a viral infection PLUS a negative procalcitonin without other underlying concern for bacterial infection, consider observing off antibiotics or discontinuation of antibiotics and continue supportive care. If the respiratory panel is positive for atypical bacterial infection (Bordetella pertussis, Chlamydophila pneumoniae, or Mycoplasma pneumoniae), consider antibiotic de-escalation to target atypical bacterial infection.             Radiology Data:   Imaging Results (Last 24 Hours)       Procedure Component Value Units Date/Time    XR Chest 1 View [[id number]] Collected: 08/07/24 1006     Updated: 08/07/24 1012    Narrative:      EXAMINATION: XR CHEST 1 VW-     8/7/2024 8:37 AM     HISTORY: CHF, pulmonary " fibrosis; R60.0-Localized edema;  R09.02-Hypoxemia; E87.70-Fluid overload, unspecified; Z74.09-Other  reduced mobility; J44.1-Chronic obstructive pulmonary disease with  (acute) exacerbation; C34.11-Malignant neoplasm of upper lobe, right  bronchus or lung     1 view chest x-ray.     COMPARISON:  8/3/2024 chest CT.  8/2/2024 chest x-ray.     Heart size is unchanged.  Aortic arch calcification is present.  Unchanged RIGHT chest wall port.     Interstitial fibrosis.     Similar appearance of focal consolidation within the RIGHT upper lobe.     A known spiculated nodule within the RIGHT apex is not well visualized.     Persistent RIGHT basilar pleural thickening with blunting of the lateral  costophrenic angle.     No pneumothorax.       Impression:      1. Interstitial fibrosis.  2. Similar appearance of focal consolidation within the RIGHT upper  lobe.           This report was signed and finalized on 8/7/2024 10:09 AM by Dr. Danilo Sebastian MD.               I have reviewed the patient's current medications.     albuterol, 1.25 mg, Nebulization, 4x Daily - RT   And  ipratropium, 0.5 mg, Nebulization, 4x Daily - RT  aspirin, 81 mg, Oral, Daily  atorvastatin, 20 mg, Oral, Nightly  bumetanide, 0.5 mg, Oral, Daily  cetirizine, 10 mg, Oral, Daily  dronedarone, 400 mg, Oral, Q12H  enoxaparin, 30 mg, Subcutaneous, Q24H  fluticasone, 2 spray, Each Nare, Daily  isosorbide mononitrate, 30 mg, Oral, Q24H  melatonin, 10 mg, Oral, Nightly  metoprolol succinate XL, 50 mg, Oral, Daily  montelukast, 10 mg, Oral, Nightly  multivitamin with minerals, 1 tablet, Oral, Daily  pantoprazole, 40 mg, Oral, Daily  predniSONE, 40 mg, Oral, Daily  sodium bicarbonate, 650 mg, Oral, TID  sodium chloride, 10 mL, Intravenous, Q12H  tamsulosin, 0.4 mg, Oral, Daily         Intake/Output Summary (Last 24 hours) at 8/7/2024 1252  Last data filed at 8/7/2024 1246  Gross per 24 hour   Intake 480 ml   Output 1950 ml   Net -1470 ml        Assessment/Plan    Assessment  Active Hospital Problems    Diagnosis     **Acute on chronic respiratory failure with hypoxia     Chronic diastolic congestive heart failure     COPD with acute exacerbation     S/P radiation > 12 weeks     Bilateral lower extremity edema     Former smoker     Hyperkalemia     Pulmonary fibrosis     Stage 3b chronic kidney disease     Malignant neoplasm of upper lobe of right lung        Treatment Plan    1. Acute on chronic respiratory failure with hypoxia-upon admission to the emergency department patient presented with his home 2 L of oxygen that he normally wears as needed.  Patient's oxygen saturation on room air was reported as 86%.  Continue  dyspnea on exertion, oxygen saturations dropped to 76% on 2 L and patient required 4 L of oxygen to maintain saturations greater than 90%.  Continue p.o. prednisone 40 mg.  Zyrtec, Flonase, and Singulair. Continue nebulizers, as needed ABGs incentive spirometry, supplemental oxygen with notification for provider for any sustained usage greater than 4 L.  Walking oximetry performed t 8/5/2024 with recommendations for 2 L at rest and 3 L with exertion, new prescription will be sent to his [employer].    2.  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  Chest x-ray performed on admission revealed a known mass followed by Dr. Michele and Dr. Cool to be increasing in size in the mid lung zone of on the right.  Patient received Keytruda, chemotherapy, and radiation which concluded with radiation in February 2024.  Due to new findings oncology consultation per Dr. Rico discussed the case with Dr. Cool and ordered a CT scan of the chest to be compared with previous.  CT scan revealed stable spiculated nodule within the medial right apex.  Masslike focus within the base of the right upper lobe shows increased size compared with 5 weeks ago.   Diffuse interstitial and reticulonodular disease within both lungs is more prominent now as compared with 5 weeks ago.  To be reviewed by oncology his follow-up in September.  Palliative care discussion with request for patient to be changed to no CPR/limited support interventions no intubation no permanent feeding tube.  A MOST form was initiated and recommendations for a complete advance directive performed by palliative care  placed in the chart for reference.    2.  Bilateral lower extremity edema-patient has a history of chronic lower extremity edema on daily Lasix.  Patient initially presented with shortness of breath and 2+ pitting edema.  Continues to significantly improved, no edema present with improving erythema.  Continue p.o. Bumex and close monitoring of erythema due to history of cellulitis.    4.  Stage III chronic kidney disease-patient's initial presentation was BUN of 27 and creatinine of 2.46 with a GFR of 25.5.  Previous discharge creatinine at 2.34.  Today creatinine remained stable at 1.95,continue to review nephrotoxic agents and monitor with daily BMP.    5.  Hyperkalemia-chronic history of hyperkalemia, presented with potassium of 5.4 given Lokelma x 1.  After discontinued heparin and initiated low-dose Lovenox calcium was stable this a.m., continue to monitor with daily BMP    6.  Chronic diastolic heart failure with preserved ejection fraction-last echocardiogram 7/3/2024 revealed ejection fraction of 60-65%.  BNP on admission was 295.  Patient has chronic complaints of bilateral lower extremity edema and shortness of breath.  Patient it is optimized on metoprolol, isosorbide, and losartan.  Continue strict intake and output, daily weights, heart failure education.    Due to patient's recent history of DVT, venous Doppler was performed without evidence of thrombus.  VTE prophylaxis Lovenox   Labs in a.m.    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     MDM Data  External documents reviewed: None   cardiac tracing (EKG, telemetry) interpretation: None  Radiology interpretation: None  Labs reviewed:   8/6/2024 CBC, BMP reviewed repeat in a.m.  Any tests that were considered but not ordered: CTA of the chest, due to patient's CKD will leave it up to oncology     Decision rules/scores evaluated (example [id number], Wells, etc): [id number] score of 5     Discussed with: Dr. Montilla, Cassandra Reynoso, MARITZA and patient     Care Planning  Shared decision making: Cassandra Rey and patient  code status and discussions: Changed to DNR/DNI   surrogate Decision Maker is son Beau or brother Keshav    Disposition  Social Determinants of Health that impact treatment or disposition: Lives at home alone and may require assistance with progressing lung cancer.  Palliative care and social work consult initiated  I expect the patient to be discharged to home with home health in 1 days.     Electronically signed by MARITZA Johnson, 08/07/24, 12:52 CDT.

## 2024-08-08 ENCOUNTER — READMISSION MANAGEMENT (OUTPATIENT)
Dept: CALL CENTER | Facility: HOSPITAL | Age: 84
End: 2024-08-08
Payer: MEDICARE

## 2024-08-08 VITALS
BODY MASS INDEX: 28.25 KG/M2 | SYSTOLIC BLOOD PRESSURE: 123 MMHG | HEART RATE: 78 BPM | TEMPERATURE: 97.6 F | OXYGEN SATURATION: 94 % | HEIGHT: 64 IN | DIASTOLIC BLOOD PRESSURE: 77 MMHG | WEIGHT: 165.5 LBS | RESPIRATION RATE: 16 BRPM

## 2024-08-08 PROBLEM — I50.32 CHRONIC HEART FAILURE WITH PRESERVED EJECTION FRACTION (HFPEF): Status: ACTIVE | Noted: 2024-08-08

## 2024-08-08 LAB
ANION GAP SERPL CALCULATED.3IONS-SCNC: 7 MMOL/L (ref 5–15)
BUN SERPL-MCNC: 44 MG/DL (ref 8–23)
BUN/CREAT SERPL: 20.8 (ref 7–25)
CALCIUM SPEC-SCNC: 10.2 MG/DL (ref 8.6–10.5)
CHLORIDE SERPL-SCNC: 106 MMOL/L (ref 98–107)
CO2 SERPL-SCNC: 26 MMOL/L (ref 22–29)
CREAT SERPL-MCNC: 2.12 MG/DL (ref 0.76–1.27)
DEPRECATED RDW RBC AUTO: 58.1 FL (ref 37–54)
EGFRCR SERPLBLD CKD-EPI 2021: 30.5 ML/MIN/1.73
ERYTHROCYTE [DISTWIDTH] IN BLOOD BY AUTOMATED COUNT: 16.3 % (ref 12.3–15.4)
GLUCOSE SERPL-MCNC: 134 MG/DL (ref 65–99)
HCT VFR BLD AUTO: 38.7 % (ref 37.5–51)
HGB BLD-MCNC: 12 G/DL (ref 13–17.7)
MCH RBC QN AUTO: 30 PG (ref 26.6–33)
MCHC RBC AUTO-ENTMCNC: 31 G/DL (ref 31.5–35.7)
MCV RBC AUTO: 96.8 FL (ref 79–97)
PLATELET # BLD AUTO: 173 10*3/MM3 (ref 140–450)
PMV BLD AUTO: 11.3 FL (ref 6–12)
POTASSIUM SERPL-SCNC: 4.5 MMOL/L (ref 3.5–5.2)
QT INTERVAL: 300 MS
QTC INTERVAL: 453 MS
RBC # BLD AUTO: 4 10*6/MM3 (ref 4.14–5.8)
SODIUM SERPL-SCNC: 139 MMOL/L (ref 136–145)
WBC NRBC COR # BLD AUTO: 9.35 10*3/MM3 (ref 3.4–10.8)

## 2024-08-08 PROCEDURE — 85027 COMPLETE CBC AUTOMATED: CPT

## 2024-08-08 PROCEDURE — 94761 N-INVAS EAR/PLS OXIMETRY MLT: CPT

## 2024-08-08 PROCEDURE — 63710000001 PREDNISONE PER 1 MG

## 2024-08-08 PROCEDURE — 25010000002 ENOXAPARIN PER 10 MG

## 2024-08-08 PROCEDURE — 94799 UNLISTED PULMONARY SVC/PX: CPT

## 2024-08-08 PROCEDURE — 80048 BASIC METABOLIC PNL TOTAL CA: CPT

## 2024-08-08 PROCEDURE — 99232 SBSQ HOSP IP/OBS MODERATE 35: CPT | Performed by: INTERNAL MEDICINE

## 2024-08-08 RX ORDER — CETIRIZINE HYDROCHLORIDE 10 MG/1
10 TABLET ORAL DAILY
Qty: 30 TABLET | Refills: 5 | Status: SHIPPED | OUTPATIENT
Start: 2024-08-08

## 2024-08-08 RX ORDER — TIOTROPIUM BROMIDE AND OLODATEROL 3.124; 2.736 UG/1; UG/1
2 SPRAY, METERED RESPIRATORY (INHALATION) DAILY
Qty: 4 G | Refills: 5 | Status: SHIPPED | OUTPATIENT
Start: 2024-08-08

## 2024-08-08 RX ORDER — PREDNISONE 20 MG/1
TABLET ORAL
Qty: 7 TABLET | Refills: 0 | Status: SHIPPED | OUTPATIENT
Start: 2024-08-08 | End: 2024-08-16

## 2024-08-08 RX ADMIN — PREDNISONE 40 MG: 20 TABLET ORAL at 08:21

## 2024-08-08 RX ADMIN — ENOXAPARIN SODIUM 30 MG: 100 INJECTION SUBCUTANEOUS at 08:21

## 2024-08-08 RX ADMIN — ASPIRIN 81 MG: 81 TABLET, COATED ORAL at 08:21

## 2024-08-08 RX ADMIN — PANTOPRAZOLE SODIUM 40 MG: 40 TABLET, DELAYED RELEASE ORAL at 08:21

## 2024-08-08 RX ADMIN — ALBUTEROL SULFATE 1.25 MG: 2.5 SOLUTION RESPIRATORY (INHALATION) at 06:22

## 2024-08-08 RX ADMIN — SODIUM BICARBONATE 650 MG: 650 TABLET, ORALLY DISINTEGRATING ORAL at 08:22

## 2024-08-08 RX ADMIN — CETIRIZINE HYDROCHLORIDE 10 MG: 10 TABLET ORAL at 08:22

## 2024-08-08 RX ADMIN — METOPROLOL SUCCINATE 50 MG: 50 TABLET, EXTENDED RELEASE ORAL at 08:22

## 2024-08-08 RX ADMIN — ISOSORBIDE MONONITRATE 30 MG: 30 TABLET, EXTENDED RELEASE ORAL at 08:22

## 2024-08-08 RX ADMIN — IPRATROPIUM BROMIDE 0.5 MG: 0.5 SOLUTION RESPIRATORY (INHALATION) at 06:22

## 2024-08-08 RX ADMIN — FLUTICASONE PROPIONATE 2 SPRAY: 50 SPRAY, METERED NASAL at 08:22

## 2024-08-08 RX ADMIN — BUMETANIDE 0.5 MG: 1 TABLET ORAL at 08:21

## 2024-08-08 RX ADMIN — Medication 1 TABLET: at 08:22

## 2024-08-08 RX ADMIN — TAMSULOSIN HYDROCHLORIDE 0.4 MG: 0.4 CAPSULE ORAL at 08:22

## 2024-08-08 RX ADMIN — DRONEDARONE 400 MG: 400 TABLET, FILM COATED ORAL at 08:21

## 2024-08-08 RX ADMIN — Medication 10 ML: at 08:22

## 2024-08-08 NOTE — PROGRESS NOTES
[facility] [place] PULMONARY PROGRESS NOTE - [facility]    Patient: Karoline Prater  1940   MR# [id number]   Acct# [id number]  08/08/24   15:43 CDT  Referring Provider: No att. providers found    Chief Complaint: Shortness of breath  Interval history: On exam the patient is resting in bed breathing comfortably on 2.5 L.  Feels that his respiratory status has greatly improved since admission and is near or at his baseline.  Continues to tolerate by mouth prednisone.  Meds:  albuterol, 1.25 mg, Nebulization, 4x Daily - RT   And  ipratropium, 0.5 mg, Nebulization, 4x Daily - RT  aspirin, 81 mg, Oral, Daily  atorvastatin, 20 mg, Oral, Nightly  bumetanide, 0.5 mg, Oral, Daily  cetirizine, 10 mg, Oral, Daily  dronedarone, 400 mg, Oral, Q12H  enoxaparin, 30 mg, Subcutaneous, Q24H  fluticasone, 2 spray, Each Nare, Daily  isosorbide mononitrate, 30 mg, Oral, Q24H  melatonin, 10 mg, Oral, Nightly  metoprolol succinate XL, 50 mg, Oral, Daily  montelukast, 10 mg, Oral, Nightly  multivitamin with minerals, 1 tablet, Oral, Daily  pantoprazole, 40 mg, Oral, Daily  predniSONE, 40 mg, Oral, Daily  sodium bicarbonate, 650 mg, Oral, TID  sodium chloride, 10 mL, Intravenous, Q12H    Physical Exam:  SpO2 Percentage    08/08/24 0422 08/08/24 0622 08/08/24 0706   SpO2: 98% 97% 94%     Body mass index is 28.39 kg/m².   Temp:  [97.6 °F (36.4 °C)-98.2 °F (36.8 °C)] 97.6 °F (36.4 °C)  Heart Rate:  [66-78] 78  Resp:  [16-18] 16  BP: (104-129)/(64-77) 123/77  Intake/Output Summary (Last 24 hours) at 8/8/2024 1543  Last data filed at 8/8/2024 0706  Gross per 24 hour   Intake 340 ml   Output 1025 ml   Net -685 ml     General: Well appearing, well nourished, in no distress.  Skin: Good turgor, no rash. Warm and dry   Head: Normocephalic, atraumatic  Eyes: Chronic left eye changes, EOM intact otherwise  Teeth/Gums: Normal inspection  Pharynx: Mucosa non-inflamed, no tonsillar hypertrophy or exudate   Neck: Supple without  stridor  Heart: Regular rate and rhythm, no murmur  Lungs: Diminished bilaterally with mild coarse breath sounds on the left.  No respiratory distress  Abdomen: Bowel sounds normal, no tenderness  Extremities: No cyanosis or edema, peripheral pulses intact   Musculoskeletal: Normal tone and movement   Neurologic: AOx3,   Psychiatric: Oriented X3, intact recent and remote memory, judgment and insight, normal mood      Result Review    Laboratory Data:  Results from last 7 days   Lab Units 08/08/24  0300 08/07/24  0333 08/06/24  0352   WBC 10*3/mm3 9.35 10.09 8.67   HEMOGLOBIN g/dL 12.0* 12.2* 11.8*   PLATELETS 10*3/mm3 173 198 215     Results from last 7 days   Lab Units 08/08/24  0300 08/07/24  0333 08/06/24  0352 08/03/24  0335 08/02/24  1608 08/02/24  1604   SODIUM mmol/L 139 138 141   < >  --  139   POTASSIUM mmol/L 4.5 4.1 4.9   < >  --  5.4*   CHLORIDE mmol/L 106 104 104   < >  --  105   CO2 mmol/L 26.0 24.0 29.0   < >  --  23.0   BUN mg/dL 44* 41* 39*   < >  --  30*   CREATININE mg/dL 2.12* 1.95* 2.19*   < >  --  2.44*   CALCIUM mg/dL 10.2 10.2 10.2   < >  --  9.6   ALK PHOS U/L  --   --   --   --   --  132*   ALT (SGPT) U/L  --   --   --   --   --  26   AST (SGOT) U/L  --   --   --   --   --  42*   LACTATE mmol/L  --   --   --   --  1.4  --     < > = values in this interval not displayed.         Lab 08/08/24  0300 08/07/24  0333 08/06/24  0352   GLUCOSE 134* 90 152*             Lab 08/02/24  1806 08/02/24  1604   PROBNP  --  295.5   HSTROP T 59* 63*     Microbiology Results (last 10 days)       Procedure Component Value - Date/Time    Respiratory Panel PCR w/COVID-19(SARS-CoV-2) JORDYN/MATILDE/BRYAN/PAD/COR/ASHER In-House, NP Swab in UTM/VTM, 2 HR TAT - Swab, Nasopharynx [[id number]]  (Normal) Collected: 08/02/24 1610    Lab Status: Final result Specimen: Swab from Nasopharynx Updated: 08/02/24 1711     ADENOVIRUS, PCR Not Detected     Coronavirus 229E Not Detected     Coronavirus HKU1 Not Detected     Coronavirus NL63  Not Detected     Coronavirus OC43 Not Detected     COVID19 Not Detected     Human Metapneumovirus Not Detected     Human Rhinovirus/Enterovirus Not Detected     Influenza A PCR Not Detected     Influenza B PCR Not Detected     Parainfluenza Virus 1 Not Detected     Parainfluenza Virus 2 Not Detected     Parainfluenza Virus 3 Not Detected     Parainfluenza Virus 4 Not Detected     RSV, PCR Not Detected     Bordetella pertussis pcr Not Detected     Bordetella parapertussis PCR Not Detected     Chlamydophila pneumoniae PCR Not Detected     Mycoplasma pneumo by PCR Not Detected    Narrative:      In the setting of a positive respiratory panel with a viral infection PLUS a negative procalcitonin without other underlying concern for bacterial infection, consider observing off antibiotics or discontinuation of antibiotics and continue supportive care. If the respiratory panel is positive for atypical bacterial infection (Bordetella pertussis, Chlamydophila pneumoniae, or Mycoplasma pneumoniae), consider antibiotic de-escalation to target atypical bacterial infection.           Recent films:  XR Chest 1 View    Result Date: 8/7/2024  EXAMINATION: XR CHEST 1 VW-  8/7/2024 8:37 AM  HISTORY: CHF, pulmonary fibrosis; R60.0-Localized edema; R09.02-Hypoxemia; E87.70-Fluid overload, unspecified; Z74.09-Other reduced mobility; J44.1-Chronic obstructive pulmonary disease with (acute) exacerbation; C34.11-Malignant neoplasm of upper lobe, right bronchus or lung  1 view chest x-ray.  COMPARISON: 8/3/2024 chest CT. 8/2/2024 chest x-ray.  Heart size is unchanged. Aortic arch calcification is present. Unchanged RIGHT chest wall port.  Interstitial fibrosis.  Similar appearance of focal consolidation within the RIGHT upper lobe.  A known spiculated nodule within the RIGHT apex is not well visualized.  Persistent RIGHT basilar pleural thickening with blunting of the lateral costophrenic angle.  No pneumothorax.      Impression: 1.  Interstitial fibrosis. 2. Similar appearance of focal consolidation within the RIGHT upper lobe.    This report was signed and finalized on 8/7/2024 10:09 AM by Dr. Danilo Sebastian MD.            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  History of tobacco use  Chronic respiratory failure and hypoxia on home oxygen  Hypertension  Hyperlipidemia  Chronic congestive diastolic heart failure with preserved ejection fraction  Atrial fibrillation  Thrombocytopenia and neutropenia/anemia requiring blood transfusion  Severe hearing impairment  Chronic kidney disease stage III  Allergic rhinitis    Recommend/plan:   Patient was seen in the follow-up visit in pulmonary rounds in medical floor today  He appears comfortable on 2.5 L oxygen at rest and 3 L during activity.  His chest x-ray shows a questionable infiltrate on the right upper lobe, he already has mild fibrosis and a right-sided lung mass which is noted in the imaging study  Continue oral prednisone taper.    Continue bronchodilator treatment.  Routine respiratory care  Continue incentive spirometry and flutter valve.  Nutritional support.  Physical activity as tolerated    Patient is okay for discharge from pulmonary standpoint.  I have sent prescriptions to his pharmacy for prednisone taper, Symbicort, Zyrtec.  I have placed a follow-up with Dr. Crocker after discharge.    Time spent by me: 35 minutes    Electronically signed by Dylan Gordon DO, 8/8/2024, 15:44 CDT

## 2024-08-08 NOTE — PLAN OF CARE
Goal Outcome Evaluation:           Progress: improving  Outcome Evaluation: VSS.  S 62-77, per tele.  Pt has been resting comfortably.  Pt was able to spontaneously void.  Probable discharge today.

## 2024-08-08 NOTE — DISCHARGE SUMMARY
[facility] Medicine Services  DISCHARGE SUMMARY       Date of Admission: 8/2/2024  Date of Discharge:  8/8/2024  Primary Care Physician: Irving Alexis MD    Presenting Problem/History of Present Illness:  Shortness of breath, leg swelling    Final Discharge Diagnoses:  Active Hospital Problems    Diagnosis     **Acute on chronic respiratory failure with hypoxia     Chronic heart failure with preserved ejection fraction (HFpEF)     Chronic diastolic congestive heart failure     COPD with acute exacerbation     S/P radiation > 12 weeks     Bilateral lower extremity edema     Former smoker     Hyperkalemia     Pulmonary fibrosis     Stage 3b chronic kidney disease     Malignant neoplasm of upper lobe of right lung        Consults:   Dr. Rico, oncology/hematology  Dr. Crocker, pulmonology  Grace Aly, palliative care    Procedures Performed: None    Pertinent Test Results:   Results for orders placed during the hospital encounter of 06/29/24    Adult Stress Echo W/ Cont or Stress Agent if Necessary Per Protocol    Interpretation Summary    Overall, this is an intermediate risk test for ischemia.    No ECG evidence of myocardial ischemia. Negative clinical evidence of myocardial ischemia. Findings consistent with a normal ECG stress test.    Abnormal stress echo consistent with an intermediate risk study for myocardial ischemia.    Left ventricular systolic function is normal. Left ventricular ejection fraction appears to be 61 - 65%.    The following left ventricular wall segments are hypokinetic: apical septal and apex hypokinetic.    Date: 08/06/24                                                                                                     ROOM AIR BASELINE   SpO2% 87   Heart Rate 86   Blood Pressure       EXERCISE ON ROOM AIR SpO2% EXERCISE ON O2 @ 2 LPM SpO2%   1 MINUTE   1 MINUTE     2 MINUTES   2 MINUTES 84   3 MINUTES   3 MINUTES Inc. To 3 lpm      4 MINUTES   4 MINUTES     5 MINUTES   5 MINUTES 94   6 MINUTES   6 MINUTES                                                                                                                    Distance Walked   Distance Walked 80 ft.   Dyspnea (Pina Scale)   Dyspnea (Pina Scale) 2   Fatigue (Pina Scale)   Fatigue (Pina Scale)   SpO2% Post Exercise   SpO2% Post Exercise 92   HR Post Exercise   HR Post Exercise 163   Time to Recovery   Time to Recovery 2 minutes      Comments: Pt. Walked 80 ft in hallway on 2 lpm. Sat fell to 84%. Pt. Felt short of air. Increased to 3 lpm. Pt. Recovered to 94% after 2 minutes. Walked pt. 100 ft and pt. Maintained O2 sat of 92%.    Imaging Results (All)       Procedure Component Value Units Date/Time    XR Chest 1 View [[id number]] Collected: 08/07/24 1006     Updated: 08/07/24 1012    Narrative:      EXAMINATION: XR CHEST 1 VW-     8/7/2024 8:37 AM     HISTORY: CHF, pulmonary fibrosis; R60.0-Localized edema;  R09.02-Hypoxemia; E87.70-Fluid overload, unspecified; Z74.09-Other  reduced mobility; J44.1-Chronic obstructive pulmonary disease with  (acute) exacerbation; C34.11-Malignant neoplasm of upper lobe, right  bronchus or lung     1 view chest x-ray.     COMPARISON:  8/3/2024 chest CT.  8/2/2024 chest x-ray.     Heart size is unchanged.  Aortic arch calcification is present.  Unchanged RIGHT chest wall port.     Interstitial fibrosis.     Similar appearance of focal consolidation within the RIGHT upper lobe.     A known spiculated nodule within the RIGHT apex is not well visualized.     Persistent RIGHT basilar pleural thickening with blunting of the lateral  costophrenic angle.     No pneumothorax.       Impression:      1. Interstitial fibrosis.  2. Similar appearance of focal consolidation within the RIGHT upper  lobe.           This report was signed and finalized on 8/7/2024 10:09 AM by Dr. Danilo Sebastian MD.       US Venous Doppler Lower Extremity Bilateral (duplex) [[id number]]  Collected: 08/05/24 1456     Updated: 08/05/24 1500    Narrative:      History: Swelling       Impression:      Impression: There is no evidence of deep venous thrombosis or  superficial thrombophlebitis of right or left lower extremities.     Comments: Bilateral lower extremity venous duplex exam was performed  using color Doppler flow, Doppler waveform analysis, and grayscale  imaging, with and without compression. There is no evidence of deep  venous thrombosis in the common femoral, superficial femoral, popliteal,  peroneal, anterior tibial, and posterior tibial veins bilaterally. No  thrombus is identified in the saphenofemoral junctions and greater  saphenous veins bilaterally.            This report was signed and finalized on 8/5/2024 2:56 PM by Dr. Sidney Connors MD.       CT Chest Without Contrast Diagnostic [[id number]] Collected: 08/04/24 1431     Updated: 08/04/24 1438    Narrative:      EXAMINATION: CT CHEST WO CONTRAST DIAGNOSTIC-      8/3/2024 11:08 AM     HISTORY: Lung cancer -worsening dyspena-Please compare to prior Ct chest  06/29/24 to assess progressive disease; R60.0-Localized edema;  R09.02-Hypoxemia; E87.70-Fluid overload, unspecified     In order to have a CT radiation dose as low as reasonably achievable  Automated Exposure Control was utilized for adjustment of the mA and/or  KV according to patient size.     CT Dose DLP = 126.65 mGy.cm.  (If there are multiple studies performed at the same time this  represents the total dose).     Noncontrast chest CT.  Axial, sagittal, and coronal sequences.     COMPARISON:  6/29/2024.     Cardiomegaly.  Coronary artery calcification.  No mediastinal mass.     Relatively stable spiculated 13 mm nodule within the medial RIGHT apex.     A masslike focus of infiltrate within the RIGHT upper lobe is increased  in size measuring 47 x 36 x 22 mm compared with 47 x 21 x 14 mm.     Interstitial and reticulonodular disease throughout both lungs  has  progressed as compared with 5 weeks ago.     No significant pleural fluid.  No pneumothorax.     Unchanged appearance of the upper abdomen.       Impression:      1. Stable spiculated nodule within the medial RIGHT apex.  2. Masslike focus within the base of the RIGHT upper lobe shows  increased size compared with 5 weeks ago.  2. Diffuse interstitial and reticulonodular disease within both lungs is  more prominent now as compared with 5 weeks ago.     This report was signed and finalized on 8/4/2024 2:35 PM by Dr. Danilo Sebastian MD.       XR Chest 1 View [[id number]] Collected: 08/02/24 1618     Updated: 08/02/24 1626    Narrative:      EXAMINATION:  XR CHEST 1 VW-  8/2/2024 3:16 PM     HISTORY: Shortness of air. Lung cancer.     COMPARISON: 6/29/2024.     TECHNIQUE: Single view AP image.     FINDINGS: A mass in the upper to midlung zone on the right appears  increased in size measuring approximately 4.8 cm. Fibrotic lung changes  appear relatively stable. Pleural thickening/effusion on the right  appears relatively stable. There is cardiomegaly. The thoracic aorta is  atheromatous.          Impression:      1. A mass in the upper to midlung zone on the right is larger compared  to the prior study measuring in the range of about 4.8 cm.  2. Relatively stable pulmonary fibrosis. Pleural thickening/effusion on  the right also appears relatively stable.  3. Cardiomegaly.        The full report of this exam was immediately signed and available to the  emergency room. The patient is currently in the emergency room.           This report was signed and finalized on 8/2/2024 4:23 PM by Dr. Ramos Lagos MD.             LAB RESULTS:      Lab 08/08/24  0300 08/07/24  0333 08/06/24  0352 08/05/24  0249 08/04/24  0412 08/03/24  0857 08/03/24  0335 08/02/24  1608 08/02/24  1604   WBC 9.35 10.09 8.67 9.70 6.57  --    < >  --  5.71   HEMOGLOBIN 12.0* 12.2* 11.8* 11.7* 11.6*  --    < >  --  11.0*   HEMATOCRIT 38.7 39.2 38.7  38.4 37.3*  --    < >  --  35.5*   PLATELETS 173 198 215 223 218  --    < >  --  203   NEUTROS ABS  --   --   --   --   --   --   --   --  3.91   IMMATURE GRANS (ABS)  --   --   --   --   --   --   --   --  0.06*   LYMPHS ABS  --   --   --   --   --   --   --   --  0.91   MONOS ABS  --   --   --   --   --   --   --   --  0.67   EOS ABS  --   --   --   --   --   --   --   --  0.13   MCV 96.8 98.0* 99.2* 98.2* 98.7*  --    < >  --  100.6*   LACTATE  --   --   --   --   --   --   --  1.4  --    D DIMER QUANT  --   --   --   --   --  0.80  --   --   --     < > = values in this interval not displayed.         Lab 08/08/24  0300 08/07/24  0333 08/06/24  0352 08/05/24  1517 08/05/24  0249 08/04/24  1545 08/04/24  0412 08/03/24  0335   SODIUM 139 138 141  --  139  --  139 139   POTASSIUM 4.5 4.1 4.9 4.6 5.3*   < > 5.3* 4.7   CHLORIDE 106 104 104  --  104  --  104 107   CO2 26.0 24.0 29.0  --  25.0  --  24.0 25.0   ANION GAP 7.0 10.0 8.0  --  10.0  --  11.0 7.0   BUN 44* 41* 39*  --  36*  --  33* 27*   CREATININE 2.12* 1.95* 2.19*  --  2.39*  --  2.44* 2.46*   EGFR 30.5* 33.7* 29.3*  --  26.4*  --  25.8* 25.5*   GLUCOSE 134* 90 152*  --  148*  --  170* 86   CALCIUM 10.2 10.2 10.2  --  10.1  --  10.2 9.3   MAGNESIUM  --   --   --   --  2.1  --  2.2 1.7    < > = values in this interval not displayed.         Lab 08/02/24  1604   TOTAL PROTEIN 6.7   ALBUMIN 3.5   GLOBULIN 3.2   ALT (SGPT) 26   AST (SGOT) 42*   BILIRUBIN 0.4   ALK PHOS 132*         Lab 08/02/24  1806 08/02/24  1604   PROBNP  --  295.5   HSTROP T 59* 63*                 Brief Urine Lab Results  (Last result in the past 365 days)        Color   Clarity   Blood   Leuk Est   Nitrite   Protein   CREAT   Urine HCG        06/29/24 1028 Yellow   Clear   Negative   Negative   Negative   Negative                 Microbiology Results (last 10 days)       Procedure Component Value - Date/Time    Respiratory Panel PCR w/COVID-19(SARS-CoV-2) JORDYN/MATILDE/BRYAN/PAD/COR/ASHER In-House,  NP Swab in UTM/VTM, 2 HR TAT - Swab, Nasopharynx [[id number]]  (Normal) Collected: 08/02/24 1610    Lab Status: Final result Specimen: Swab from Nasopharynx Updated: 08/02/24 1711     ADENOVIRUS, PCR Not Detected     Coronavirus 229E Not Detected     Coronavirus HKU1 Not Detected     Coronavirus NL63 Not Detected     Coronavirus OC43 Not Detected     COVID19 Not Detected     Human Metapneumovirus Not Detected     Human Rhinovirus/Enterovirus Not Detected     Influenza A PCR Not Detected     Influenza B PCR Not Detected     Parainfluenza Virus 1 Not Detected     Parainfluenza Virus 2 Not Detected     Parainfluenza Virus 3 Not Detected     Parainfluenza Virus 4 Not Detected     RSV, PCR Not Detected     Bordetella pertussis pcr Not Detected     Bordetella parapertussis PCR Not Detected     Chlamydophila pneumoniae PCR Not Detected     Mycoplasma pneumo by PCR Not Detected    Narrative:      In the setting of a positive respiratory panel with a viral infection PLUS a negative procalcitonin without other underlying concern for bacterial infection, consider observing off antibiotics or discontinuation of antibiotics and continue supportive care. If the respiratory panel is positive for atypical bacterial infection (Bordetella pertussis, Chlamydophila pneumoniae, or Mycoplasma pneumoniae), consider antibiotic de-escalation to target atypical bacterial infection.            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 Patient presented to [facility] emergency department on 8/2/2024 with complaints of shortness of breath requiring continuous use of his as needed oxygenation with associated leg  swelling. Patient states his shortness of breath is worse with exertion.  He denied any chest pain, nausea, vomiting, loss of consciousness, fever or change in his urinary or bowel habits. On admission patient's blood pressure was soft, other vital signs were stable. Patient received IV Lasix 20 mg and states he is passing urine. ED lab results troponin x 2 mildly elevated, potassium of 5.4, sodium 139, proBNP of 295.5, BUN of 30, creatinine of 2.44, CBC unremarkable, respiratory panel negative. Chest x-ray noted mass in the upper midlung zone on the right larger compared to prior study measuring the range of about 4.8 cm, stable pulmonary fibrosis with cardiomegaly.     He was admitted to the medical floor with acute on chronic respiratory failure with hypoxia.  On admission to the emergency department, patient presented with his home 2 L of oxygen that he normally wears as needed.  Patient's oxygen saturation on room air was reported as 86%.  He continued to have dyspnea on exertion and oxygen saturation dropped to 76% on 2 L and patient required oxygen of 4 L to keep saturation greater than 90%.  Zyrtec, Flonase, Singulair, nebulizer treatments ordered.  IV steroids transitioned to oral prednisone.  Patient was seen by Dr. Crocker pulmonology.  Patient was weaned down to oxygen at 2 L.  Walking oximetry 8/6/2024 with saturation 87% on room air prior to activity.  Patient walked 80 feet on 2 L and saturation decreased to 84%.  Oxygen increased to 3 L with activity.  At discharge recommendations for oxygen 2 L at rest and 3 L with exertion.    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  He presented to the emergency department after new and worsening shortness of breath over the last week.  Chest x-ray on admission revealed a known mass to be increasing in size in the mid lung zone of on the right.  Patient is followed by Dr. Michele and Dr. Cool.  Patient received Keytruda,  chemotherapy, and radiation which concluded with radiation in February 2024.  Due to new findings oncology consulted and Dr. Rico discussed the case with Dr. Cool and ordered a CT scan of the chest to be compared with previous.  CT scan revealed stable spiculated nodule within the medial right apex.  Masslike focus within the base of the right upper lobe shows increased size compared with 5 weeks ago.  Diffuse interstitial and reticulonodular disease within both lungs is more prominent now as compared with 5 weeks ago.  To be reviewed by oncology with his follow-up in September.  Palliative care discussion with request for patient to be changed to no CPR/limited support interventions no intubation no permanent feeding tube.  A MOST form was initiated and recommendations for a complete advance directive performed by palliative care .     He presented with bilateral lower extremity edema.  Patient has a history of chronic lower extremity edema on daily Lasix.  Patient initially presented with shortness of breath and 2+ pitting edema.  Throughout hospitalization edema significantly improved and resolved.  Erythema improved.  Patient was started on Bumex on admission and continued at discharge as opposed to Lasix prior to admission.     Stage III chronic kidney disease.  On admission creatinine 2.46, BUN 27, GFR 25.5.  At previous discharge creatinine 2.34.  Renal function monitored during hospitalization and creatinine 2.12 at discharge, baseline     Hyperkalemia with history of chronic hyperkalemia.  Potassium 5.4 on admission and Lokelma given.  Potassium monitored and stable at 4.5 at discharge    Chronic diastolic heart failure with preserved ejection fraction.  No exacerbation of symptoms.  Echocardiogram 7/3/2024 revealed ejection fraction of 60-65%.  BNP on admission was 295.  Patient has chronic complaints of bilateral lower extremity edema and shortness of breath.  Patient is optimized on  "metoprolol, isosorbide, and losartan.  Intake and output, daily weight monitored, heart failure education throughout hospital stay.     Due to patient's recent history of DVT, venous Doppler was performed without evidence of thrombus.  Lovenox ordered for deep vein thrombosis prophylaxis.    Flomax held on admission due to low blood pressure.  Patient reported some urinary hesitancy with bladder scan greater than 900.  In and out cath performed.  Flomax resumed and patient able to void without difficulty.  Postvoiding residual less than 300.    Patient had approximately 2 hours of atrial fibrillation heart rates up to the 180s.  He received Lopressor 5 mg IV with spontaneous conversion.  Patient is followed by Dr. Jessi HERNANDEZ cardiology and has appointment scheduled with Rita Rose PA-C on 8/23/2024.  Patient has not been on any anticoagulation in the past due to thrombocytopenia.  Patient declines any anticoagulation at discharge.    On 8/8/2024, he is stable for discharge.  Patient will wear oxygen at 2 L continuously and 3 L with activity.  He will complete tapering dose of prednisone at discharge.  Patient was started on Bumex during hospitalization and Lasix discontinued.  She will continue with daily weights and take extra dose of Bumex for weight gain of 2 pounds in 1 day or 5 pounds in 1 week.  He will follow-up with Dr. Alexis on 8/13/2024 and Rita Rose PA-C cardiology on 8/23/2024.  He will keep scheduled appointment with Dr. Michele on 9/18/2024      Physical Exam on Discharge:  /77 (BP Location: Right arm, Patient Position: Lying)   Pulse 78   Temp 97.6 °F (36.4 °C) (Oral)   Resp 16   Ht 162.6 cm (64.02\")   Wt 75.1 kg (165 lb 8 oz)   SpO2 94%   BMI 28.39 kg/m²   Physical Exam  Vitals and nursing note reviewed.   Constitutional:       Comments: Up in bed.  Oxygen at 2 L.  No visitors in room.   HENT:      Head: Normocephalic and atraumatic.      Nose: No congestion.      Mouth/Throat:      " Pharynx: Oropharynx is clear. No oropharyngeal exudate or posterior oropharyngeal erythema.   Eyes:      Extraocular Movements: Extraocular movements intact.      Pupils: Pupils are equal, round, and reactive to light.      Comments: Blind left eye   Cardiovascular:      Rate and Rhythm: Normal rate and regular rhythm.      Heart sounds: No murmur heard.     Comments: Normal sinus rhythm 62-81 on telemetry.  Pulmonary:      Breath sounds: No wheezing, rhonchi or rales.      Comments: Oxygen at 2 L.  Saturation 94%.  Abdominal:      Palpations: Abdomen is soft.      Tenderness: There is no abdominal tenderness.   Genitourinary:     Comments: Voiding.  Musculoskeletal:         General: No swelling or tenderness.      Cervical back: Normal range of motion and neck supple.   Skin:     General: Skin is warm and dry.   Neurological:      General: No focal deficit present.      Mental Status: He is alert and oriented to person, place, and time.   Psychiatric:         Mood and Affect: Mood normal.         Behavior: Behavior normal.         Thought Content: Thought content normal.         Judgment: Judgment normal.         Condition on Discharge: Stable for discharge home with home health    Discharge Disposition:  Home or Self Care    Discharge Medications:     Discharge Medications        New Medications        Instructions Start Date   bumetanide 0.5 MG tablet  Commonly known as: BUMEX   0.5 mg, Oral, Daily      cetirizine 10 MG tablet  Commonly known as: zyrTEC   10 mg, Oral, Daily      predniSONE 20 MG tablet  Commonly known as: DELTASONE   40 mg, Oral, Daily      predniSONE 20 MG tablet  Commonly known as: DELTASONE   20 mg, Oral, Daily   Start Date: August 12, 2024            Changes to Medications        Instructions Start Date   isosorbide mononitrate 30 MG 24 hr tablet  Commonly known as: IMDUR  What changed: when to take this   30 mg, Oral, Every 24 Hours Scheduled             Continue These Medications         Instructions Start Date   acetaminophen 500 MG tablet  Commonly known as: TYLENOL   1,000 mg, Oral, Nightly      albuterol sulfate  (90 Base) MCG/ACT inhaler  Commonly known as: PROVENTIL HFA;VENTOLIN HFA;PROAIR HFA   2 puffs, Inhalation, Every 4 Hours PRN      allopurinol 100 MG tablet  Commonly known as: ZYLOPRIM   100 mg, Oral, Daily Before Lunch, Takes at lunch      aspirin 81 MG EC tablet   81 mg, Oral, Daily      atorvastatin 20 MG tablet  Commonly known as: LIPITOR   20 mg, Oral, Nightly      dronedarone 400 MG tablet  Commonly known as: MULTAQ   400 mg, Oral, Every 12 Hours Scheduled      fluticasone 50 MCG/ACT nasal spray  Commonly known as: FLONASE   1 spray, Nasal, Daily      losartan 50 MG tablet  Commonly known as: COZAAR   50 mg, Oral, Daily      melatonin 5 MG tablet tablet   10 mg, Oral, Nightly      metoprolol succinate XL 50 MG 24 hr tablet  Commonly known as: Toprol XL   50 mg, Oral, Daily      montelukast 10 MG tablet  Commonly known as: SINGULAIR   10 mg, Oral, Nightly      multivitamin with minerals tablet tablet   1 tablet, Oral, Daily      pantoprazole 40 MG EC tablet  Commonly known as: Protonix   40 mg, Oral, Daily      sodium bicarbonate 650 MG tablet   650 mg, Oral, 3 Times Daily      tamsulosin 0.4 MG capsule 24 hr capsule  Commonly known as: FLOMAX   1 capsule, Oral, Nightly             Stop These Medications      furosemide 20 MG tablet  Commonly known as: LASIX              This patient has current or prior documentation of an left ventricular ejection fraction (LVEF) of less than or equal to 40%.    The patient was prescribed or already taking an ACE/ARB.    The patient was prescribed already taking bisoprolol, carvedilol, or sustained release metoprolol succinate.     Discharge Diet:   Diet Instructions       Diet: Cardiac Diets; Healthy Heart (2-3 Na+); Regular (IDDSI 7); Thin (IDDSI 0)      Discharge Diet: Cardiac Diets    Cardiac Diet: Healthy Heart (2-3 Na+)    Texture:  Regular (IDDSI 7)    Fluid Consistency: Thin (IDDSI 0)    Diet: Regular/House Diet; Regular (IDDSI 7); Thin (IDDSI 0)      Discharge Diet: Regular/House Diet    Texture: Regular (IDDSI 7)    Fluid Consistency: Thin (IDDSI 0)            Activity at Discharge:   Activity Instructions       Activity as Tolerated      Activity as Tolerated      Measure Weight              Discharge instructions:  1.  For worsening shortness of breath leg swelling seek medical attention.  2.  Tapering dose of prednisone at discharge  3.  Discontinue Lasix  4.  Bumex 0.5 mg orally daily.  Daily weight.  For weight gain of 2 pounds in 1 day or 5 pounds in 1 week takes extra dose of Bumex.  5.  Follow-up with Dr. Alexis 8/13/2024  6.  Follow-up with Rita Rsoe PA-C  cardiology 8/23/2024  7.  Oxygen at 2 L at rest and 3 L with exertion or per walking oximetry 7/5/2024    Follow-up Appointments:   Future Appointments   Date Time Provider Department Center   8/15/2024 11:00 AM Fidencio Miller APRN [facility]   8/23/2024  8:30 AM Rita Rose PA [facility]       Test Results Pending at Discharge: None    Electronically signed by MARITZA Thomas, 08/08/24, 09:22 CDT.    Time: 35 minutes.  Discussed with Dr. Montilla and patient.    The above documentation resulted from a face-to-face encounter by me Meeta SALAS, [facility], [place].

## 2024-08-08 NOTE — CASE MANAGEMENT/SOCIAL WORK
Continued Stay Note   [place]     Patient Name: Karoline Prater  MRN: [id number]  Today's Date: 8/8/2024    Admit Date: 8/2/2024    Plan: Home with [facility]    Discharge Plan       Row Name 08/08/24 0912       Plan    Post Acute Provider List DME Supplier    Delivered To Patient    Method of Delivery Telephone    Plan Comments Referral for home 02.  Pt was provided choices and chose [facility].  Lexi aware of referral.  They will bring portable to pt's room shortly.                   Discharge Codes    No documentation.                 Expected Discharge Date and Time       Expected Discharge Date Expected Discharge Time    Aug 8, 2024               ANDI Yeung

## 2024-08-09 NOTE — OUTREACH NOTE
Prep Survey      Flowsheet Row Responses   [facility] facility patient discharged from? [place]   Is LACE score < 7 ? No   Eligibility Readm Mgmt   Discharge diagnosis Acute on chronic respiratory failure with hypoxia-COPD with acute exacerbation   Does the patient have one of the following disease processes/diagnoses(primary or secondary)? COPD   Does the patient have Home health ordered? Yes   What is the Home health agency?  [facility].   Is there a DME ordered? No   Prep survey completed? Yes            NEREYDA REED - Registered Nurse

## 2024-08-11 NOTE — PROGRESS NOTES
[facility]  Radiation Oncology Clinic   Danny Shearer MD, FACR  Fidencioselina Miller MARITZA  _______________________________________________  [facility]  Department of Radiation Oncology  [address] [place] [id number]  Office: [phone]  Fax: [phone]    DATE: 08/15/2024  PATIENT: Karoline Prater  1940                         MEDICAL RECORD #: [id number]    1. Malignant neoplasm of upper lobe of right lung    2. History of radiation therapy    3. Former smoker                                       REASON FOR VISIT:    Chief Complaint   Patient presents with    Lung Cancer     Reason for Follow up Visit:  Karoline Prater is a very pleasant 84 y.o. patient that completed radiation to the lung and returns to the clinic today for routine follow up exam. Reports activity change, fatigue, visual disturbance, cough, SOB, and dizziness. Uses O2 at 2-3L. Denies unexpected weight change, nasuea/vomiting, diarrhea, wheezing, light-headedness, weakness, and headaches.      History of Present Illness:  Diagnosed with two PET+ right lung nodules. He completed 5000 cGy in 5 fractions to the right upper and right lower lobes of the lung on 02/07/2024.    09/12/2018 - CT Chest due to weight loss:  New lobulated right upper lobe mass 5.7 cm that extended to the back hilum  Numerous mediastinal lymph nodes ranging in size from mm to 1.3 cm and  subcarinal lymph node that measured 1.5 x 1.5 x 3.5 cm    09/12/2018 - CT Abdomen/Pelvis:  4.9 x 4 x 4 cm soft tissue mass with peripheral calcification immediately adjacent to the gallbladder in the head of the pancreas area    10/11/2018 - CT Chest without contrast:  5.7 x 3.2 cm mass in the right upper lobe is suspicious for primary pulmonary neoplasm, with apparent extension into the right upper lobe bronchus that supplies this portion of the lung. After discussion with Dr. Michele, the CT-guided biopsy scheduled  for today was canceled in favor of bronchoscopy and biopsy. The patient understands that if the bronchoscopy and biopsy are nondiagnostic, CT-guided biopsy may still be necessary.  COPD, with mild interstitial disease, numerous small subpleural nodules are seen in the lung bases, with an average size of 2 to 3 mm.  Evidence of healed granulomatous disease.  Small nonobstructing right-sided nephrolithiasis measuring 4 mm.  Small coarse calcification in the posterior pancreatic head which may be related to chronic pancreatitis.    10/11/2018 - MRI Abdomen with and without contrast:  4.1 x 3.4 cm soft tissue mass in the subhepatic space, abutting the second portion the duodenum with mild displacement of the duodenum. The mass is probably separate from the duodenum. The mass shows mild enhancement and also contains some coarse calcifications.   There is no involvement of the pancreas and the pancreas appears within normal limits. Differential possibilities include a desmoid tumor, less likely leiomyoma of the wall of the duodenum or malignancy. Consider further evaluation with endoscopic ultrasound and biopsy of this mass.    10/23/2018 - Chest x-ray:  No significant change in the RIGHT upper lobe mass.  There is a 2.2 cm nodule overlying the LEFT lower chest that is likely within the soft tissues. This can be followed on subsequent exams.    10/24/2018 -  Bronchoscopy with biopsy:  Station 7 lymph node, endobronchial ultrasound guided fine needle aspiration, smears (6), ThinPrep preparation (1) and cell block.  Background small, mature lymphocytes.  Clusters of histiocytes suggestive of granulomata.  A few bronchial epithelial cells.  Negative for malignant cells.  Kinyoun and GMS stains are negative for acid-fast bacilli and fungal organisms, respectively.  Bronchial washings, smears (4) and cell block:  Blood, bronchial epithelial cells and a few histiocytes.  Negative for malignant cells.  Kinyoun and GMS stains are  negative for acid-fast bacilli and fungal organisms, respectively.    11/27/2018 - Bronchoscopy/EBUS with biopsy per :  Lung mass, right upper lobe, FNA aspiration smear and cell block:  adenocarcinoma. See comment  Lung, right upper lobe, bronchoalveolar lavage:  adenocarcinoma  Lymph node, 10L, FNA:  bronchial epithelial cells, proteinaceous debris and rare inflammatory cells, no malignant cells identified  Lymph node, 4L, FNA:  lymphocytes and bronchial epithelial cells; no malignant cells identified  Lymph node, station 7, FNA:  lymphocytes and bronchial epithelial cells, no malignant cells identified  Lymph node, 4R, FNA:  lymphocytes and bronchial epithelial cells, no malignant cells identified  Lymph node, 10R, FNA:  bronchial epithelial cells, blood with acute inflammatory; no definite lymphocytes or malignant cells identified  Lung mass, right upper lobe, transbronchial biopsies:  moderate to poorly differentiated adenocarcinoma, consistent with a lung primary    11/27/2018 - PET Scan:  Large FDG avid right upper lobe pulmonary mass consistent with malignancy.  This could be a primary lung malignancy or a metastasis.  Hypermetabolic normal-sized to mildly enlarged mediastinal lymph nodes and bilateral pulmonary hilar lymph nodes. The level of uptake within these lymph nodes suggests neoplasm. Reactive lymphadenopathy would be less likely.  Hypermetabolic irregular right internal mammary nodule suspect for a metastasis.  Upper abdominal mass contiguous with the duodenum showing marked   FDG uptake consistent with neoplasm. This mass contains a few coarse calcifications. These could be dystrophic calcifications. Calcifications can also be seen in some mucinous tumors. Carcinoid tumors may also calcify, although the scan does not show the perilesional stranding often associated with carcinoid, and the level of FDG uptake is higher in this case than typically seen with carcinoid. Differential  diagnosis includes metastasis, lymphoma, and primary neoplasm of small bowel.  There is a focus of increased FDG uptake along a small bowel loop in the anterior midabdomen. The unehanced transmission CT images suggest mild   soft tissue thickening of the wall in this region. Possible neoplastic nodule versus artifact.  A dedicated, contrast-enhanced CT scan of the chest, abdomen, and pelvis, and also utilizing orally administered contrast material, is suggested for further evaluation.    02/18/2019 - CT Chest with contrast:  New left hydronephrosis with decreased enhancement of the left kidney compared to the right, this is incompletely visualized and characterized. Recommend CT abdomen and pelvis.  Increased size of subhepatic mass adjacent to the descending duodenum which measures 4.3 x 9.0 cm, previously 4.1 x 3.4 cm on 10/11/2018 abdomen MRI. This is concerning for malignancy.  There is a 0.8 cm nodular opacity within the trachea, which could represent adherent mucus or mass. Recommend direct visualization or attention on follow-up.  Slightly decreased size of right upper lobe mass measuring 4.9 x 3.5 cm, previously 6.1 x 3.6 cm. Similar mediastinal and paraesophageal lymphadenopathy.   Similar pneumobilia.    02/20/2019 - CT Abdomen/Pelvis without contrast:  Mild left-sided hydronephrosis likely related to obstruction of the proximal left ureter due to a lobular 3.5 cm mass medial to the left renal hilum, 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 Retention of contrast within the left renal cortex in patchy distribution may represent ATN. There is increased attenuation of urine within the dilated left renal collecting system probably due to delayed excretion of contrast.  Compared with the previous MRI from October, 2018 there is heart increase in size of the large subhepatic mass, currently measuring 8.9 x 5.4 cm, compared with 4.1 x 3.4 cm previous. This is concerning for neoplasm also.    04/02/2019 - PET Scan:  Abnormal  PET/CT, with a lobular hypermetabolic mass in the right lung, upper lobe, measuring approximately 5.2 x 3.5 cm, with extension to the anterior right hilum. This has a maximum SUV of 10.1 is compatible with malignancy. There is evidence of mediastinal and hilar lymphadenopathy, with low-level metabolic activity.  Interval increase in size of a large mass in the right upper abdomen, inseparable from the duodenum, measuring approximately 10.7 x 6.9 cm, compared with 5.4 x 8.9 cm on the CT from 02/20/2019. This mass has a maximum SUV of 23.6.  Slight increase in 2 small inseparable masses medial to the hilum of the left kidney, measuring 2.2 and 2.6 cm and the other measuring 3.9 cm, the maximum SUV of 18.6. There is associated mild left sided hydronephrosis probably due to obstruction of the proximal left ureter.    04/18/2019 - Chemotherapy course:  Palliative Carbo/Alimta/Keytruda (to cover pancreatic mass as well)    04/26/2019 - CT Abdomen/Pelvis with contrast:  Interval decrease in size of the duodenal or letty hepatis mass.  Interval decrease in size of the obstructive mass in the LEFT renal hilum. This now measures approximately 2.9 x 2.1 cm.  No new disease.  Urinary bladder distention and hydroureter is likely due to outlet obstruction.    06/27/2019 - CT Chest without contrast:  Continued diminishment in size of a primary lung neoplasm within the right upper lobe. Overall dimensions of the lesion have decreased as well as a diminishing soft tissue component with increased tumor necrosis. No new lesions are present.  Essentially stable mediastinal and paraesophageal lymphadenopathy. One of the AP window nodes shows slight interval increase in size but only by a few millimeters. No new adenopathy is demonstrated. No convincing evidence of osseous metastases to the bony thorax.  Pneumobilia.  Coronary calcifications with mild cardiomegaly. No evidence of pericardial effusion.  Borderline dilatation of the  ascending thoracic aorta..    06/27/2019 - CT Abdomen/Pelvis without contrast:  Previously identified periduodenal/subhepatic space soft tissue mass appears resolved.   Previously identified mass at the left renal pelvis appears significantly decreased. Difficult to say if it is completely resolved on this noncontrast exam. There does appear to be a residual mild left caliectasis.  No new mass lesion identified in the abdomen or pelvis. No suspicious adenopathy.    08/10/2019 - CT Chest without contrast:  Increased soft tissue component in the known right upper lobe mass.  Increase/more conspicuous interlobular septal thickening, with no new discrete left-sided mass or nodule identified on today's examination.  Similar mediastinal lymphadenopathy.    10/17/2019 - CT Chest without contrast:  Spiculated lesion in the right upper lobe laterally is not significantly changed. Size measurements are listed above.  The patient has moderately severe pulmonary fibrotic changes that appears to be worsening.  There are couple of new areas of nodularity peripherally on the left side likely representing coalescing areas of fibrosis. New pulmonary nodules are felt to be less likely. There is a 6 mm area in the left upper lobe on image 65 of series 3 and a 1.3 cm area in the extreme left lung base on image 113 of series 3.  Atheromatous disease of the thoracic aorta and coronary arteries with cardiomegaly. The pulmonary arteries are dilated.  Small scattered mediastinal lymph nodes appear stable.      10/17/2019 - CT Abdomen/Pelvis without contrast:  Atheromatous calcification of the aortoiliac vessels and coronary arteries. Cardiomegaly.  Prior cholecystectomy. Air in the biliary tree likely related to previous sphincterotomy.  No CT evidence of metastatic disease.  Hyperdense and hypodense renal lesions bilaterally appear relatively stable. These are probably a combination of simple cysts and hemorrhagic cysts. These lesions are  not fully characterized without contrast administration. They all appear relatively stable in size compared to the previous exam. Stranding of the fat around the kidneys is seen suggesting chronic inflammation. This is also present previously.  Enlarged prostate measuring 4.9 cm. Bladder wall thickening is likely associated with hypertrophy of the muscle. Acute and chronic inflammation are possible. Neoplasm less likely.  Small fat-containing inguinal hernias.  Probable chondroid lesion in the right femoral head/neck junction, stable. Probable enchondroma. Degenerative changes of the spine. Bilateral pars defects at L5 with grade 1 spondylolisthesis.    01/09/2020 - CT Chest without contrast:  Stable spiculated right upper lobe nodule/mass with similar mediastinal lymphadenopathy. Questionable right hilar adenopathy with diminishes assessment due to the lack of IV contrast secondary to diminished renal function.  Advanced emphysema as well as severe pulmonary fibrosis.  No new pulmonary nodules.  Cardiomegaly. Moderate vascular calcifications with involvement of coronary arteries.    01/09/2020 - CT Abdomen/Pelvis without contrast:  Mildly prominent aortocaval retroperitoneal lymph nodes position just below the level of the renal vein worrisome for potential lymphatic metastasis. These are very similar to 10/17/2019 but have slightly increased in size compared back to 04/26/2019.  Pneumobilia. No discrete suspicious focal liver lesion.  Apparent wall thickening of the duodenum. Duodenal pathology considered on (infectious/inflammatory or neoplastic).  Similar scattered renal lesions favorable for hyperintense and simple renal cysts but not completely assessed with nonenhanced imaging. No hydronephrosis.  Borderline prominent prostate. Fatty containing inguinal hernias.  Stable chondroid based lesion of the right femoral neck. Similar grade 1 L5 spondylolisthesis.    07/16/2020 - CT Abdomen/Pelvis without  contrast:  A retroperitoneal lymph node within the aortocaval space has decreased slightly in size compared with 6 months ago.  No new abnormality is seen.    11/12/2020 - CT Chest without contrast:  Mixed response therapy with interval decrease in size in the RIGHT upper lobe pulmonary nodule but increase in size and mediastinal lymph nodes.  In addition, there is severe emphysema with findings of severe pulmonary fibrosis. Interval worsening of interstitial opacities bilaterally as well as suggestion of some pleural nodularity. Lymphangitic spread of malignancy should be considered.  Small pleural effusion on the RIGHT is new.    11/12/2020 - CT Abdomen/Pelvis without contrast:  The aortic caval node described on the comparison study is not significantly changed in size when measured at the same location.  Nonobstructing renal calculus on the LEFT. No change in the indeterminate renal lesions on the LEFT, likely cysts.  There is nonspecific free fluid in the pelvis, new since the prior study and there is some mild nonspecific mesenteric haziness.    03/17/2021 - CT Chest without contrast:  Decrease in size of right upper lobe mass. This was 22 x 47 mm. This is now 20 x 33 mm.  Severe emphysematous change with extensive fibrotic change throughout the right and left lung.  Small bilateral basilar pneumothoraces  Coronary and aortic vascular calcification.    03/17/2021 - CT Abdomen/Pelvis without contrast:  Nonobstructing calculus lower pole left kidney  Low-density lesions associated with the left renal contour probably proteinaceous cyst these are unchanged  Chronic right lateral pleural complex in the lung base with bilateral small basilar pneumothoraces..     08/25/2021 - CT Chest without contrast:  Small right pneumothorax.  Redemonstration right upper lobe spiculated mass measuring 3.0 x 2.1 cm, previously 3.3 x 2.0 cm on 3/17/2021.  Similar right upper lobe 0.6 cm spiculated pulmonary nodule compared to  11/12/2020.  Emphysematous and fibrotic lung changes, similar to prior.  Similar mildly enlarged mediastinal lymph nodes.  Borderline heart size with coronary artery calcifications versus stent.    11/15/2021 - CT Chest without contrast:  There is a spiculated mass in the right upper lobe as before, this appears unchanged, measuring approximately 3.0 x 2.2 cm. This has bands of soft tissue extend to the lateral pleura there is pleural thickening, also stable. There is a stable 4 mm stellate shaped nodule in the right upper lobe. No new pulmonary nodule is identified.  No measurable mediastinal or hilar lymphadenopathy on this limited noncontrast study.  Slight increase in volume of loculated pleural fluid in the right lung base, with increasing pleural thickening at the anterior margin of the right inferior hemithorax. This may be related to radiation change.  Small pericardial effusion. Extensive atherosclerotic disease of the coronary arteries and thoracic aorta. 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    11/15/2021 - CT Abdomen/Pelvis without contrast:  Stable exam with no evidence of intra-abdominal or pelvic metastatic disease.  Cysts are noted within the left kidney as before, there is a stable 10 mm exophytic probable atelectasis at the inferior pole the left kidney. There is a 7 mm nonobstructing nephrolithiasis at the inferior pole the left kidney.  Extensive interstitial fibrosis in the lung bases with loculated right basilar pleural fluid and pleural thickening, described in more detail in a separate chest CT report today.  Heavy calcified atherosclerotic plaque is noted within the abdominal aorta and iliac arteries with normal aortic caliber.  Stable bilateral fat-containing inguinal hernias.  Probable right-sided constipation without evidence of bowel obstruction.    11/16/2022 - CT Chest without contrast:  Enlarging 9 mm spiculated RIGHT upper lobe pulmonary nodule.  This is concerning for a primary lung neoplasm. Differential diagnosis also includes an indolent infectious or inflammatory process. Consider further evaluation with PET/CT.   No change in 3 cm spiculated mass in the RIGHT upper lobe abutting the major and minor fissure.  Emphysema and pulmonary fibrosis, similar to prior exams.    11/16/2022 - CT Abdomen/Pelvis without contrast:  Interstitial fibrosis within the lung bases. There is a loculated appearing effusion within the right lateral costophrenic angle.  Pneumobilia as well as a small amount of contrast within the more central biliary tree. This is felt to be related to a biliary diversion with what appears to be a pulled up bowel loop within the right upper quadrant. The gallbladder is surgically absent. No discrete hepatic mass is identified  Nonobstructing left-sided nephrolithiasis. There are cysts of the left kidney. There are 2 lesions which are more indeterminate but which are stable from the previous exam involving the posterior mid pole and lower pole of the left kidney. These could be followed up on subsequent imaging. No evidence of ureteral stone or obstructive uropathy.  Mild constipation. There is no obstruction or free air. The small bowel is normal distribution and appearance.  Atheromatous calcification ectasia the abdominal aorta and iliac arteries.  Small fat-containing inguinal hernias. The prostate gland is mildly enlarged. Urinary bladder is normal in appearance.  Listhesis at L4-L5 and L5-S1 as described above.  Overall stable appearance from the previous exam. No radiographic evidence of metastatic disease to the abdomen or pelvis    01/05/2023 - CT Abdomen/pelvis without contrast:  Mild constipation. Otherwise normal bowel gas pattern.  Right-sided pleural effusion which appears loculated with associated pleural thickening. The pleural thickening is somewhat nodular in appearance and if the patient has a prior history of lung neoplasm  could potentially represent pleural-based studding. This shows some progression from the previous examination. The effusion shows slight interval increase in size.  There is interstitial fibrosis within both lung bases.  Mild aneurysmal dilatation of the ascending thoracic aorta. Heavy coronary calcifications are present. There is no pericardial effusion.  Cortical cysts of both kidneys. There are again 2 lesions within the left kidney which are more indeterminate but which are stable from the previous exam. There is nonobstructing left-sided nephrolithiasis. No evidence of ureteral stone or obstructive uropathy. No free fluid or localized inflammatory process is demonstrated.  Small bilateral fat-containing inguinal hernias. The prostate gland is enlarged.  Listhesis at the L4-L5 and L5-S1 level..     11/08/2023 - CT Abdomen/Pelvis without contrast:  Pneumobilia, increased from prior.  This may be seen following sphincterotomy or other hepatobiliary procedures.  Recommend correlation with operative history and hepatic enzyme levels.  No portal venous gas or bowel pneumatosis.   Nonobstructing left nephrolithiasis and unchanged left renal cysts.   Moderate quantity of stool throughout the colon.     11/08/2023 - CT Chest without contrast:  Significant increase in size of an anterior right apical nodule now measuring 2.6 x 1.9 cm, compared to 0.9 cm previously, highly suspicious for enlarging neoplasm.   Stable 3.6 x 1.9 cm spiculated right upper lobe mass.   Increased opacity along the anterior minor fissure which is nonspecific.   Stable fibrosis and emphysema.   Stable enlarged, partially calcified mediastinal lymph nodes.     12/06/2023 - PET Scan:  The PET CT examination demonstrates a very high level of activity in the  2.6 x 1.90 cm anterior right apical nodular consolidation seen on the patient's recent CT exam.  The SUV max of 14.8 is consistent with malignancy until proven otherwise.  The 3.6 x 1.9 cm  spiculated nodule seen in the right upper lobe below this with SUV max of 5.34 is also consistent with an increased likelihood of malignancy.  There is modest increased activity in nodes in the right hilum and mediastinum, one or more of which may be metastatic.  The examination otherwise demonstrates a generally physiologic distribution of activity elsewhere in the thorax, as described in the report.   The examination demonstrates a generally physiologic distribution of activity in the abdomen and pelvis, as described in the report.   The examination demonstrates a generally physiologic distribution of activity in the included portions of the head and neck, as described in the report.   The examination demonstrates a generally physiologic distribution of activity in the included portions of the skeleton and extremeties, as described in the report.     12/13/2023 - Appointment with Dr. Michele:  Referral is made to Dr. Danny Cool for SBRT to the abnormal lung lesions  I will see Mr. Prater back in follow-up in 2 months    12/21/2023 - Consult with :  Following this discussion and in consideration of the diagnostic data/evaluation of the patient, I recommended a course of stereotactic radiosurgery to two right lung nodules. Will simulate treatment fields today, 3D CT with MIPS to begin the planning process, final course pending.    01/24/2024 - 02/07/2024 - Completed radiation course:  Received 5000 cGy in 5 fractions to the right upper and right lower lobes of the lung via Stereotactic Radiation Therapy - SRT.     03/20/2024 - CT Chest without contrast:  Compared with 11/08/2023.   Small decrease in the size of the right apical mass with   dimensions as described.    Minimal, if any change in the additional spiculated soft tissue right upper lobe mass. Findings are superimposed upon fibrosis and emphysema. If further evaluation is needed then a PET / CT scan would be suggested.     03/20/2024 - CT  Abdomen/Pelvis without contrast:  No evidence of metastasis in the abdomen/pelvis.     04/03/2024 - Appointment with :  A chest x-ray is ordered to be done at his return  CBC and CMP are ordered today  Port is flushed today and will be flushed in 6 weeks  I will see Mr. Prater back in follow-up in 3 months  Return in about 3 months (around 7/3/2024).    05/06/2024 - CT Chest without contrast:  A 4 x 2.2 cm spiculated right upper lobe lesion laterally previously measures about 3.5 x 2.2 cm. Therefore, it may be slightly larger in size.  A previously described 9 mm spiculated nodule in the right upper lobe more superiorly is now surrounded by consolidation. I suspect this represents posttreatment change. Correlate clinically.  Emphysema and pulmonary fibrosis.  Atheromatous calcification of the thoracic aorta and coronary arteries. Ectasia of the ascending aorta measuring 3.8 cm. Dilated main pulmonary artery segment measuring 3.5 cm. Cardiomegaly.    05/09/2024 - Appointment with :  Return in 3 months with a CT chest before  will defer to medical oncology for imaging orders at [facility].     06/29/2024 - CT Chest without contrast:  A spiculated mass in the right upper lobe laterally is larger on the current study.  There is consolidation around a previously described nodule in the right lung apex likely related to posttreatment change.  Diffuse reticulonodular fibrotic changes throughout both lungs. Emphysema.  Pleural thickening on the right that is more focal in the right lung base laterally. Metastatic pleural disease is considered.  Ectasia of the ascending thoracic aorta measuring 4.3 cm. Main pulmonary artery segment is dilated measuring 3.6 cm. Mild cardiomegaly.  Mediastinal lymph nodes appear stable. Multiple lymph nodes are calcified.    06/29/2024 - CT Abdomen/Pelvis without contrast:  There is a mildly dilated proximal small bowel loop measuring 4 cm. No other small bowel distention is seen.  This could be transient or due to partial obstruction. Gastric wall thickening likely related to under distention. There is under distention of portions of the colon and moderate stool in the colon.  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 Fibrosis in the lung bases with emphysema.  Left renal cyst. A 6 mm nonobstructive renal stone lower pole on the left.  Mildly enlarged prostate measuring 4.7 cm.  Air within the biliary tree likely related to prior sphincterotomy. Prior cholecystectomy.  Coarsening of the trabecula in the right femoral neck and trochanteric region could be related to early Paget's disease. There is no cortical thickening. A lipo sclerosing myxofibrous tumor is felt to be less likely as there is no sclerotic margin.    07/03/2024 - Documentation per :  I have reviewed the CT scan of the thorax on this admission for atrial fibrillation with rapid ventricular response.  He completed recent SBRT radiotherapy for 2 right lung nodules on February 7, 2024.  Review of the current CT scan appears consistent with postradiation change.  We cannot entirely rule out progression however this appears unlikely with the chronology and radiographic appearance.  He is scheduled to return to our department in approximately 1 month with follow-up CT scan of the thorax and we will follow this closely with serial CT scans of the thorax.    08/02/2024 - Presents to ER with complaints of SOB. He was admitted for further evaluation.     08/03/2024 - Inpatient consult with :  CT chest without contrast  Continue to monitor cytopenias  Continue current supportive care  Palliative care consultation  Consider pulmonary consultation    08/03/2024 - Inpatient with Tarik Crocker MD:  Patient has shortness of breath at the time of presentation and he has known history of COPD and pulmonary fibrosis but no pulmonary follow-up was done in the last few years after his last  inpatient visit with Dr. Becerra 5 years ago  He was using only using albuterol rescue inhaler at home and was not on any long-term inhaler no recent PFT was available.  I have reviewed the recent imaging studies.  Patient with extensive pulmonary fibrosis COPD   His pulmonary fibrosis does not appear to be UIP but more diffuse and it could be to some extent contributed by radiation treatment for the lung cancer.  Patient already been treated with the chemotherapy and received Keytruda and completed radiation treatment and followed by oncology and radiation oncology.  His current presentation is more consistent with a COPD exacerbation.  CT scan of the chest showed mass on the right side and fibrotic changes but no definite consolidation was noted.  His white cell count is normal platelets are normal.  No antibiotics started  I started the patient on Symbicort and DuoNeb and also will start the patient on Solu-Medrol  Continue oxygen.  Currently on 2.5 L that is his baseline.  Use Vapotherm or BiPAP if needed  Plan for outpatient pulmonary follow-up with a PFT and possible sleep study when more stable.  Added fluticasone and Zyrtec for nasal allergy and he is already on Singulair.  There is no immediate plan for starting back on Keytruda radiation treatment.  Oncology following.    Monitor renal function carefully.  He may be at his baseline but would benefit from nephrology consult  Continue DVT and stress ulcer prophylaxis.  On heparin on Protonix.  Continue support.  PT OT.  He needs to address his CODE STATUS because oncologist suggested palliative care  Patient have repeat labs imaging studies from time to time.    08/03/2024 - CT chest without contrast:  Stable spiculated nodule within the medial RIGHT apex.  Masslike focus within the base of the RIGHT upper lobe shows increased size compared with 5 weeks ago.  Diffuse interstitial and reticulonodular disease within both lungs is more prominent now as compared  with 5 weeks ago.    08/08/2024 - Discharged from hospital with follow up appointments scheduled.     History obtained from  PATIENT and CHART    PAST MEDICAL HISTORY  Past Medical History:   Diagnosis Date    Arthritis     Atrial fibrillation with rapid ventricular response 05/31/2019    Blindness of left eye     BPH with obstruction/lower urinary tract symptoms     Cancer     stomach & lung    CHF (congestive heart failure)     CKD (chronic kidney disease) stage 3, GFR 30-59 ml/min     COPD with acute exacerbation 8/3/2024    Coronary artery disease     Elevated cholesterol     Essential hypertension 10/02/2017    Fibrotic lung diseases     GERD (gastroesophageal reflux disease)     Hearing loss     History of transfusion     Hydronephrosis of left kidney     Hyperlipidemia     Hypertension     pt was taken off of all of his medications for BP (atenolol, lisinopril, lasix) because his BP kept bottoming out so his primary dr told him to discontinue them 1-2 months ago (Jan/Feb 2019). pt states he takes no medications currently.    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 Patel is just going to keep an eye on it and not do surgery right now.    Mediastinal adenopathy 10/24/2018    [facility]    Monoclonal gammopathy of unknown significance (MGUS) 09/11/2018    Pancreatic mass     pt states he had this in 2013 but it went away on its own. Now recent CT shows it has come back so he is going to have an ultrasound on 3/13/19.    Shortness of breath       PAST SURGICAL HISTORY  Past Surgical History:   Procedure Laterality Date    ABDOMINAL SURGERY      BRONCHOSCOPY N/A 10/24/2018    Procedure: BRONCHOSCOPY WITH BIOPSY, EBUS;  Surgeon: Gareth Becerra MD;  Location: [facility];  Service: Pulmonary    CHOLECYSTECTOMY      COLONOSCOPY N/A 1/3/2019    Procedure:  COLONOSCOPY WITH ANESTHESIA;  Surgeon: Rnady Somers DO;  Location: [facility] ENDOSCOPY;  Service: Gastroenterology    CYSTOSCOPY, RETROGRADE PYELOGRAM AND STENT INSERTION Left 3/8/2019    Procedure: CYSTOSCOPY RETROGRADE BILATERAL PYELOGRAM;  Surgeon: Jos Sylvester MD;  Location: [facility] OR;  Service: Urology    ENDOSCOPY N/A 2018    Procedure: ESOPHAGOGASTRODUODENOSCOPY WITH ANESTHESIA;  Surgeon: Randy Somers DO;  Location: [facility] ENDOSCOPY;  Service: Gastroenterology    ENDOSCOPY N/A 2019    Procedure: ESOPHAGOGASTRODUODENOSCOPY WITH ANESTHESIA;  Surgeon: Lilliam Jj MD;  Location: [facility] OR;  Service: Gastroenterology    ENDOSCOPY N/A 2019    Procedure: ESOPHAGOGASTRODUODENOSCOPY WITH ANESTHESIA;  Surgeon: Pilo Bansal MD;  Location: [facility] ENDOSCOPY;  Service: Gastroenterology    EYE SURGERY Left 1964    FOOT SURGERY Right 1966    joint    FRACTURE SURGERY        FAMILY HISTORY  family history includes Hypertension in his mother; Leukemia in his father.    SOCIAL HISTORY  Social History     Tobacco Use    Smoking status: Former     Current packs/day: 0.00     Types: Cigarettes     Start date: 10/29/1954     Quit date: 10/29/2003     Years since quittin.8    Smokeless tobacco: Former     Types: Chew     Quit date:    Vaping Use    Vaping status: Never Used   Substance Use Topics    Alcohol use: No    Drug use: No     ALLERGIES  Penicillins     MEDICATIONS    Current Outpatient Medications:     acetaminophen (TYLENOL) 500 MG tablet, Take 2 tablets by mouth Every Night., Disp: , Rfl:     albuterol sulfate  (90 Base) MCG/ACT inhaler, Inhale 2 puffs Every 4 (Four) Hours As Needed for Wheezing., Disp: , Rfl:     allopurinol (ZYLOPRIM) 100 MG tablet, Take 1 tablet by mouth Daily Before Lunch. Takes at lunch, Disp: , Rfl:     aspirin 81 MG EC tablet, Take 1 tablet by mouth Daily., Disp: 90 tablet, Rfl: 0    atorvastatin (LIPITOR) 20 MG tablet, Take 1 tablet by mouth  Every Night., Disp: 90 tablet, Rfl: 0    bumetanide (BUMEX) 0.5 MG tablet, Take 1 tablet by mouth Daily for 90 days., Disp: 30 tablet, Rfl: 2    cetirizine (zyrTEC) 10 MG tablet, Take 1 tablet by mouth Daily., Disp: 30 tablet, Rfl: 5    dronedarone (MULTAQ) 400 MG tablet, Take 1 tablet by mouth Every 12 (Twelve) Hours., Disp: 60 tablet, Rfl: 2    fluticasone (FLONASE) 50 MCG/ACT nasal spray, 1 spray into the nostril(s) as directed by provider Daily., Disp: , Rfl:     isosorbide mononitrate (IMDUR) 30 MG 24 hr tablet, Take 1 tablet by mouth Daily. (Patient taking differently: Take 1 tablet by mouth Daily Before Lunch.), Disp: 90 tablet, Rfl: 0    losartan (COZAAR) 50 MG tablet, Take 1 tablet by mouth Daily., Disp: , Rfl:     melatonin 5 MG tablet tablet, Take 2 tablets by mouth Every Night., Disp: , Rfl:     metoprolol succinate XL (Toprol XL) 50 MG 24 hr tablet, Take 1 tablet by mouth Daily., Disp: 90 tablet, Rfl: 0    montelukast (SINGULAIR) 10 MG tablet, Take 1 tablet by mouth Every Night., Disp: , Rfl:     multivitamin with minerals tablet tablet, Take 1 tablet by mouth Daily., Disp: , Rfl:     pantoprazole (PROTONIX) 40 MG EC tablet, Take 1 tablet by mouth Daily., Disp: 30 tablet, Rfl: 1    predniSONE (DELTASONE) 20 MG tablet, Take 2 tablets by mouth Daily for 1 day, THEN 1 tablet Daily for 3 days, THEN 0.5 tablets Daily for 4 days., Disp: 7 tablet, Rfl: 0    sodium bicarbonate 650 MG tablet, Take 1 tablet by mouth 3 (Three) Times a Day., Disp: , Rfl:     tamsulosin (FLOMAX) 0.4 MG capsule 24 hr capsule, Take 1 capsule by mouth Every Night., Disp: , Rfl:     tiotropium bromide-olodaterol (Stiolto Respimat) 2.5-2.5 MCG/ACT aerosol solution inhaler, Inhale 2 puffs Daily., Disp: 4 g, Rfl: 5    Current outpatient and discharge medications have been reconciled for the patient.  Reviewed by: MARITZA Sultana    The following portions of the patient's history were reviewed and updated as appropriate: allergies,  "current medications, past family history, past medical history, past social history, past surgical history and problem list.    REVIEW OF SYSTEMS  Review of Systems   Constitutional:  Positive for activity change (d/t shortness of breath) and fatigue. Negative for unexpected weight change.   HENT: Negative.     Eyes:  Positive for visual disturbance (blind left eye).        Glasses   Respiratory:  Positive for cough (productive \"a little bit\") and shortness of breath (very short of breath with exertion). Negative for wheezing.         02@ 2-3 Liters   Cardiovascular:  Negative for chest pain.   Gastrointestinal: Negative.    Endocrine: Negative.    Genitourinary: Negative.    Musculoskeletal: Negative.    Skin: Negative.    Allergic/Immunologic: Negative.    Neurological:  Positive for dizziness (occasionally when looking up).   Hematological:  Bruises/bleeds easily.     PHYSICAL EXAM  VITAL SIGNS:   Vitals:    08/15/24 1102 08/15/24 1116   BP: 130/60    SpO2: Comment: 83% 2.5L after ambulation 96%  Comment: 02@2L per NC after rest   Weight: 74.8 kg (165 lb)    Height: 162.6 cm (64\")    PainSc: 0-No pain      Physical Exam  Vitals reviewed.   Constitutional:       Appearance: Normal appearance.   HENT:      Head: Normocephalic.      Nose: Nose normal.   Eyes:      Pupils: Pupils are equal, round, and reactive to light.   Cardiovascular:      Rate and Rhythm: Normal rate and regular rhythm.      Pulses: Normal pulses.      Heart sounds: Normal heart sounds.   Pulmonary:      Effort: Pulmonary effort is normal. No respiratory distress.      Breath sounds: Normal breath sounds. No wheezing.   Abdominal:      General: Bowel sounds are normal.      Palpations: There is no mass.   Musculoskeletal:         General: Normal range of motion.      Cervical back: Normal range of motion and neck supple. No tenderness.   Lymphadenopathy:      Cervical: No cervical adenopathy.   Skin:     General: Skin is warm and dry.      " Capillary Refill: Capillary refill takes less than 2 seconds.   Neurological:      General: No focal deficit present.      Mental Status: He is alert and oriented to person, place, and time.      Motor: No weakness.   Psychiatric:         Mood and Affect: Mood normal.         Behavior: Behavior normal.          Performance Status: ECOG (3) Capable of limited self-care, confined to bed or chair > 50% of waking hours    Clinical Quality Measures  - Pain Documented by Standardized Tool, FPS Karoline Prater reports a pain score of 0. Given his pain assessment as noted, treatment options were discussed and the following options were decided upon as a follow-up plan to address the patient's pain:  No pain, no plan given .  Pain Medications               acetaminophen (TYLENOL) 500 MG tablet Take 2 tablets by mouth Every Night.    aspirin 81 MG EC tablet Take 1 tablet by mouth Daily.    predniSONE (DELTASONE) 20 MG tablet Take 2 tablets by mouth Daily for 1 day, THEN 1 tablet Daily for 3 days, THEN 0.5 tablets Daily for 4 days.          - Body Mass Index Screening and Follow-Up Plan  BMI is >= 25 and <30. (Overweight) The following options were offered after discussion;: none (medical contraindication)    - Tobacco Use: Screening and Cessation Intervention  Social History    Tobacco Use      Smoking status: Former        Packs/day: 0.00        Types: Cigarettes        Start date: 10/29/1954        Quit date: 10/29/2003        Years since quittin.8      Smokeless tobacco: Former        Types: Chew        Quit date:     - Advanced Care Planning Advance Care Planning   ACP discussion was held with the patient during this visit. Patient does not have an advance directive, information provided.    - Depression screening - PHQ-9 Total Score:      ASSESSMENT AND PLAN  1. Malignant neoplasm of upper lobe of right lung    2. History of radiation therapy    3. Former smoker      Orders Placed This Encounter   Procedures     NM Pet Skull Base To Mid Thigh     Standing Status:   Future     Standing Expiration Date:   8/15/2025     Order Specific Question:   What radiopharmaceutical is preferred for this exam?     Answer:   FDG  (offered at all sites)     Order Specific Question:   Reason for Exam:     Answer:   new mass in right lung     Order Specific Question:   Release to patient     Answer:   Routine Release [[id number]]     RECOMMENDATIONS: Karoline Prater is status post completion of radiation therapy to the lung and presents to our clinic today for surveillance exam and to review imaging.  Diagnosed with two PET+ right lung nodules. He completed 5000 cGy in 5 fractions to the right upper and right lower lobes of the lung on 02/07/2024.    CT-scan of the chest on 08/03/2024 revealed a stable spiculated nodule within the medial RIGHT apex. Masslike focus within the base of the RIGHT upper lobe shows increased size compared with 5 weeks ago. Diffuse interstitial and reticulonodular disease within both lungs is more prominent now as compared with 5 weeks ago.    We discussed that again this right upper lobe mass has again. I will order a PET scan for further evaluation of this suspicious area. He will return in 2 weeks for a follow up. I have instructed him to continue to see the other health care providers as per their scheduling.    Patient Instructions   1) PET scan ordered, they will call you  2) return in 2 weeks    Return in about 2 weeks (around 8/29/2024).    Time Spent: I spent 44 minutes caring for Kraoline on this date of service. This time includes time spent by me in the following activities: preparing for the visit, reviewing tests, obtaining and/or reviewing a separately obtained history, performing a medically appropriate examination and/or evaluation, counseling and educating the patient/family/caregiver, ordering medications, tests, or procedures, and documenting information in the medical record.     Fidencio GALVAN  MARITZA Miller  08/15/2024

## 2024-08-12 ENCOUNTER — READMISSION MANAGEMENT (OUTPATIENT)
Dept: CALL CENTER | Facility: HOSPITAL | Age: 84
End: 2024-08-12
Payer: MEDICARE

## 2024-08-12 NOTE — OUTREACH NOTE
COPD/PN Week 1 Survey      Flowsheet Row Responses   [facility], operated by [facility] patient discharged from? [place]   Does the patient have one of the following disease processes/diagnoses(primary or secondary)? COPD   Week 1 attempt successful? Yes   Call start time 1734   Call end time 1739   Discharge diagnosis Acute on chronic respiratory failure with hypoxia-COPD with acute exacerbation   Meds reviewed with patient/caregiver? Yes   Is the patient having any side effects they believe may be caused by any medication additions or changes? No   Does the patient have all medications ordered at discharge? Yes   Is the patient taking all medications as directed (includes completed medication regime)? Yes   Does the patient have a primary care provider?  Yes   What is the Home health agency[facility].   Has home health visited the patient within 72 hours of discharge? No  [waiting to receive call, will check with PCP in office in am, 8/13/24 to f/u on  services]   Pulse Ox monitoring Intermittent   Pulse Ox device source Patient   O2 Sat comments 95% on 2L O2   O2 Sat: education provided Sat levels   Psychosocial issues? No   Did the patient receive a copy of their discharge instructions? Yes   Nursing interventions Reviewed instructions with patient   What is the patient's perception of their health status since discharge? Improving   Nursing Interventions Nurse provided patient education   If the patient is a current smoker, are they able to teach back resources for cessation? Not a smoker   Is the patient/caregiver able to teach back the hierarchy of who to call/visit for symptoms/problems? PCP, Specialist, Home health nurse, Urgent Care, ED, 911 Yes   Is the patient able to teach back COPD zones? Yes   Patient reports what zone on this call? Green Zone   Green Zone Reports doing well, Breathing without shortness of breath, Usual amounts of cough and phlegm/mucous, Slept well last night, Usual activity and exercise  level, Appetite is good   Green Zone interventions: Take daily medications, Use oxygen as prescribed, Continue regular exercise/diet plan   Week 1 call completed? Yes   Call end time [id number]            Марина COON - Registered Nurse

## 2024-08-15 ENCOUNTER — OFFICE VISIT (OUTPATIENT)
Age: 84
End: 2024-08-15
Payer: MEDICARE

## 2024-08-15 VITALS
OXYGEN SATURATION: 96 % | BODY MASS INDEX: 28.17 KG/M2 | WEIGHT: 165 LBS | DIASTOLIC BLOOD PRESSURE: 60 MMHG | HEIGHT: 64 IN | SYSTOLIC BLOOD PRESSURE: 130 MMHG

## 2024-08-15 DIAGNOSIS — Z92.3 HISTORY OF RADIATION THERAPY: ICD-10-CM

## 2024-08-15 DIAGNOSIS — Z87.891 FORMER SMOKER: ICD-10-CM

## 2024-08-15 DIAGNOSIS — C34.11 MALIGNANT NEOPLASM OF UPPER LOBE OF RIGHT LUNG: Primary | ICD-10-CM

## 2024-08-15 PROCEDURE — G0463 HOSPITAL OUTPT CLINIC VISIT: HCPCS | Performed by: RADIOLOGY

## 2024-08-20 ENCOUNTER — HOSPITAL ENCOUNTER (OUTPATIENT)
Dept: CT IMAGING | Facility: HOSPITAL | Age: 84
Discharge: HOME | End: 2024-08-20
Payer: MEDICARE

## 2024-08-20 DIAGNOSIS — Z87.891 FORMER SMOKER: ICD-10-CM

## 2024-08-20 DIAGNOSIS — Z92.3 HISTORY OF RADIATION THERAPY: ICD-10-CM

## 2024-08-20 DIAGNOSIS — C34.11 MALIGNANT NEOPLASM OF UPPER LOBE OF RIGHT LUNG: ICD-10-CM

## 2024-08-20 PROCEDURE — A9552 F18 FDG: HCPCS

## 2024-08-20 PROCEDURE — 0 FLUDEOXYGLUCOSE F18 SOLUTION

## 2024-08-20 PROCEDURE — 78815 PET IMAGE W/CT SKULL-THIGH: CPT

## 2024-08-20 RX ADMIN — FLUDEOXYGLUCOSE F 18 1 DOSE: 200 INJECTION, SOLUTION INTRAVENOUS at 13:10

## 2024-08-22 ENCOUNTER — READMISSION MANAGEMENT (OUTPATIENT)
Dept: CALL CENTER | Facility: HOSPITAL | Age: 84
End: 2024-08-22
Payer: MEDICARE

## 2024-08-22 NOTE — OUTREACH NOTE
COPD/PN Week 2 Survey      Flowsheet Row Responses   [facility], [place] patient discharged from? [place]   Does the patient have one of the following disease processes/diagnoses(primary or secondary)? COPD   Week 2 attempt successful? Yes   Call start time 1120   Call end time 1128   Discharge diagnosis Acute on chronic respiratory failure with hypoxia-COPD with acute exacerbation   Meds reviewed with patient/caregiver? Yes   Is the patient taking all medications as directed (includes completed medication regime)? Yes   Does the patient have a primary care provider?  Yes   Has the patient kept scheduled appointments due by today? Yes   Home health interventions Called Home Health agency   Home health comments Pt states he is waiting for a return call from his PCP office re: HH,  Advised pt to speak to PCP again re: interest in HH services- agency stated they are not accepting new pts at this time   Pulse Ox monitoring Intermittent   Pulse Ox device source Patient   O2 Sat comments 2.5 L O2 prn - 96%   O2 Sat: education provided Sat levels, Monitoring frequency, When to seek care   What is the patient's perception of their health status since discharge? Same   Is the patient able to teach back COPD zones? Yes   Week 2 call completed? Yes   Call end time 1128            Katy BLACKBURN - Registered Nurse

## 2024-08-26 ENCOUNTER — HOSPITAL ENCOUNTER (INPATIENT)
Facility: HOSPITAL | Age: 84
LOS: 2 days | Discharge: HOME HEALTH | DRG: 228 | End: 2024-08-30
Attending: EMERGENCY MEDICINE | Admitting: INTERNAL MEDICINE
Payer: MEDICARE

## 2024-08-26 ENCOUNTER — APPOINTMENT (OUTPATIENT)
Dept: GENERAL RADIOLOGY | Facility: HOSPITAL | Age: 84
DRG: 228 | End: 2024-08-26
Payer: MEDICARE

## 2024-08-26 ENCOUNTER — NURSE TRIAGE (OUTPATIENT)
Dept: CALL CENTER | Facility: HOSPITAL | Age: 84
End: 2024-08-26
Payer: MEDICARE

## 2024-08-26 DIAGNOSIS — Z00.6 ENCOUNTER FOR EXAMINATION FOR NORMAL COMPARISON AND CONTROL IN CLINICAL RESEARCH PROGRAM: ICD-10-CM

## 2024-08-26 DIAGNOSIS — Z92.3 S/P RADIATION > 12 WEEKS: ICD-10-CM

## 2024-08-26 DIAGNOSIS — I48.92 ATRIAL FLUTTER, UNSPECIFIED TYPE: ICD-10-CM

## 2024-08-26 DIAGNOSIS — N17.9 AKI (ACUTE KIDNEY INJURY): Primary | ICD-10-CM

## 2024-08-26 DIAGNOSIS — J96.21 ACUTE ON CHRONIC RESPIRATORY FAILURE WITH HYPOXIA: ICD-10-CM

## 2024-08-26 DIAGNOSIS — R79.89 ELEVATED TROPONIN: ICD-10-CM

## 2024-08-26 DIAGNOSIS — J44.1 COPD WITH ACUTE EXACERBATION: ICD-10-CM

## 2024-08-26 DIAGNOSIS — Z74.09 IMPAIRED MOBILITY: ICD-10-CM

## 2024-08-26 DIAGNOSIS — J18.9 PNEUMONIA OF RIGHT UPPER LOBE DUE TO INFECTIOUS ORGANISM: ICD-10-CM

## 2024-08-26 DIAGNOSIS — I47.19 NARROW COMPLEX TACHYCARDIA: ICD-10-CM

## 2024-08-26 LAB
ALBUMIN SERPL-MCNC: 3.2 G/DL (ref 3.5–5.2)
ALBUMIN/GLOB SERPL: 1 G/DL
ALP SERPL-CCNC: 101 U/L (ref 39–117)
ALT SERPL W P-5'-P-CCNC: 30 U/L (ref 1–41)
ANION GAP SERPL CALCULATED.3IONS-SCNC: 12 MMOL/L (ref 5–15)
AST SERPL-CCNC: 37 U/L (ref 1–40)
BASOPHILS # BLD AUTO: 0.02 10*3/MM3 (ref 0–0.2)
BASOPHILS NFR BLD AUTO: 0.2 % (ref 0–1.5)
BILIRUB SERPL-MCNC: 0.7 MG/DL (ref 0–1.2)
BUN SERPL-MCNC: 51 MG/DL (ref 8–23)
BUN/CREAT SERPL: 13.5 (ref 7–25)
CALCIUM SPEC-SCNC: 9.7 MG/DL (ref 8.6–10.5)
CHLORIDE SERPL-SCNC: 100 MMOL/L (ref 98–107)
CO2 SERPL-SCNC: 23 MMOL/L (ref 22–29)
CREAT SERPL-MCNC: 3.78 MG/DL (ref 0.76–1.27)
D-LACTATE SERPL-SCNC: 2.4 MMOL/L (ref 0.5–2)
DEPRECATED RDW RBC AUTO: 59 FL (ref 37–54)
EGFRCR SERPLBLD CKD-EPI 2021: 15.2 ML/MIN/1.73
EOSINOPHIL # BLD AUTO: 0.08 10*3/MM3 (ref 0–0.4)
EOSINOPHIL NFR BLD AUTO: 0.8 % (ref 0.3–6.2)
ERYTHROCYTE [DISTWIDTH] IN BLOOD BY AUTOMATED COUNT: 16.5 % (ref 12.3–15.4)
GLOBULIN UR ELPH-MCNC: 3.1 GM/DL
GLUCOSE SERPL-MCNC: 202 MG/DL (ref 65–99)
HCT VFR BLD AUTO: 35.2 % (ref 37.5–51)
HGB BLD-MCNC: 11.1 G/DL (ref 13–17.7)
HOLD SPECIMEN: NORMAL
IMM GRANULOCYTES # BLD AUTO: 0.05 10*3/MM3 (ref 0–0.05)
IMM GRANULOCYTES NFR BLD AUTO: 0.5 % (ref 0–0.5)
LIPASE SERPL-CCNC: 10 U/L (ref 13–60)
LYMPHOCYTES # BLD AUTO: 1.17 10*3/MM3 (ref 0.7–3.1)
LYMPHOCYTES NFR BLD AUTO: 11.4 % (ref 19.6–45.3)
MAGNESIUM SERPL-MCNC: 1.6 MG/DL (ref 1.6–2.4)
MCH RBC QN AUTO: 30.7 PG (ref 26.6–33)
MCHC RBC AUTO-ENTMCNC: 31.5 G/DL (ref 31.5–35.7)
MCV RBC AUTO: 97.2 FL (ref 79–97)
MONOCYTES # BLD AUTO: 0.83 10*3/MM3 (ref 0.1–0.9)
MONOCYTES NFR BLD AUTO: 8.1 % (ref 5–12)
NEUTROPHILS NFR BLD AUTO: 79 % (ref 42.7–76)
NEUTROPHILS NFR BLD AUTO: 8.07 10*3/MM3 (ref 1.7–7)
NRBC BLD AUTO-RTO: 0 /100 WBC (ref 0–0.2)
PLATELET # BLD AUTO: 140 10*3/MM3 (ref 140–450)
PMV BLD AUTO: 12 FL (ref 6–12)
POTASSIUM SERPL-SCNC: 5.9 MMOL/L (ref 3.5–5.2)
PROT SERPL-MCNC: 6.3 G/DL (ref 6–8.5)
RBC # BLD AUTO: 3.62 10*6/MM3 (ref 4.14–5.8)
SODIUM SERPL-SCNC: 135 MMOL/L (ref 136–145)
TROPONIN T SERPL HS-MCNC: 111 NG/L
WBC NRBC COR # BLD AUTO: 10.22 10*3/MM3 (ref 3.4–10.8)
WHOLE BLOOD HOLD COAG: NORMAL
WHOLE BLOOD HOLD SPECIMEN: NORMAL

## 2024-08-26 PROCEDURE — 84132 ASSAY OF SERUM POTASSIUM: CPT | Performed by: EMERGENCY MEDICINE

## 2024-08-26 PROCEDURE — 87147 CULTURE TYPE IMMUNOLOGIC: CPT | Performed by: EMERGENCY MEDICINE

## 2024-08-26 PROCEDURE — 36415 COLL VENOUS BLD VENIPUNCTURE: CPT

## 2024-08-26 PROCEDURE — 25810000003 SODIUM CHLORIDE 0.9 % SOLUTION: Performed by: EMERGENCY MEDICINE

## 2024-08-26 PROCEDURE — 93010 ELECTROCARDIOGRAM REPORT: CPT | Performed by: EMERGENCY MEDICINE

## 2024-08-26 PROCEDURE — 85025 COMPLETE CBC W/AUTO DIFF WBC: CPT | Performed by: EMERGENCY MEDICINE

## 2024-08-26 PROCEDURE — 71045 X-RAY EXAM CHEST 1 VIEW: CPT

## 2024-08-26 PROCEDURE — 25010000002 CEFTRIAXONE PER 250 MG: Performed by: EMERGENCY MEDICINE

## 2024-08-26 PROCEDURE — 83690 ASSAY OF LIPASE: CPT | Performed by: EMERGENCY MEDICINE

## 2024-08-26 PROCEDURE — 83735 ASSAY OF MAGNESIUM: CPT | Performed by: EMERGENCY MEDICINE

## 2024-08-26 PROCEDURE — 93005 ELECTROCARDIOGRAM TRACING: CPT | Performed by: EMERGENCY MEDICINE

## 2024-08-26 PROCEDURE — 25810000003 SODIUM CHLORIDE 0.9 % SOLUTION 250 ML FLEX CONT: Performed by: EMERGENCY MEDICINE

## 2024-08-26 PROCEDURE — 25010000002 AZITHROMYCIN PER 500 MG: Performed by: EMERGENCY MEDICINE

## 2024-08-26 PROCEDURE — 92960 CARDIOVERSION ELECTRIC EXT: CPT

## 2024-08-26 PROCEDURE — 93005 ELECTROCARDIOGRAM TRACING: CPT

## 2024-08-26 PROCEDURE — 80053 COMPREHEN METABOLIC PANEL: CPT | Performed by: EMERGENCY MEDICINE

## 2024-08-26 PROCEDURE — 87076 CULTURE ANAEROBE IDENT EACH: CPT | Performed by: EMERGENCY MEDICINE

## 2024-08-26 PROCEDURE — 5A2204Z RESTORATION OF CARDIAC RHYTHM, SINGLE: ICD-10-PCS | Performed by: EMERGENCY MEDICINE

## 2024-08-26 PROCEDURE — 83605 ASSAY OF LACTIC ACID: CPT | Performed by: EMERGENCY MEDICINE

## 2024-08-26 PROCEDURE — 87040 BLOOD CULTURE FOR BACTERIA: CPT | Performed by: EMERGENCY MEDICINE

## 2024-08-26 PROCEDURE — 87154 CUL TYP ID BLD PTHGN 6+ TRGT: CPT | Performed by: EMERGENCY MEDICINE

## 2024-08-26 PROCEDURE — 84484 ASSAY OF TROPONIN QUANT: CPT | Performed by: EMERGENCY MEDICINE

## 2024-08-26 PROCEDURE — 99291 CRITICAL CARE FIRST HOUR: CPT

## 2024-08-26 RX ORDER — ETOMIDATE 2 MG/ML
0.1 INJECTION INTRAVENOUS ONCE
Status: COMPLETED | OUTPATIENT
Start: 2024-08-26 | End: 2024-08-26

## 2024-08-26 RX ADMIN — SODIUM CHLORIDE 1000 MG: 900 INJECTION INTRAVENOUS at 23:22

## 2024-08-26 RX ADMIN — AZITHROMYCIN DIHYDRATE 500 MG: 500 INJECTION, POWDER, LYOPHILIZED, FOR SOLUTION INTRAVENOUS at 23:48

## 2024-08-26 RX ADMIN — SODIUM CHLORIDE 1000 ML: 9 INJECTION, SOLUTION INTRAVENOUS at 23:13

## 2024-08-26 RX ADMIN — ETOMIDATE 7.48 MG: 2 INJECTION, SOLUTION INTRAVENOUS at 21:41

## 2024-08-26 RX ADMIN — SODIUM CHLORIDE 1000 ML: 9 INJECTION, SOLUTION INTRAVENOUS at 21:34

## 2024-08-26 RX ADMIN — SODIUM CHLORIDE 1000 ML: 9 INJECTION, SOLUTION INTRAVENOUS at 22:15

## 2024-08-26 NOTE — Clinical Note
Hemostasis started on the right femoral vein. Manual pressure applied to vessel. Closure device additional comment: Sutured figure eight

## 2024-08-27 ENCOUNTER — READMISSION MANAGEMENT (OUTPATIENT)
Dept: CALL CENTER | Facility: HOSPITAL | Age: 84
End: 2024-08-27
Payer: MEDICARE

## 2024-08-27 PROBLEM — I47.19 NARROW COMPLEX TACHYCARDIA: Status: ACTIVE | Noted: 2024-08-27

## 2024-08-27 LAB
ALBUMIN SERPL-MCNC: 3 G/DL (ref 3.5–5.2)
ALBUMIN/GLOB SERPL: 1.1 G/DL
ALP SERPL-CCNC: 85 U/L (ref 39–117)
ALT SERPL W P-5'-P-CCNC: 26 U/L (ref 1–41)
ANION GAP SERPL CALCULATED.3IONS-SCNC: 9 MMOL/L (ref 5–15)
AST SERPL-CCNC: 25 U/L (ref 1–40)
BACTERIA BLD CULT: ABNORMAL
BASOPHILS # BLD AUTO: 0.01 10*3/MM3 (ref 0–0.2)
BASOPHILS NFR BLD AUTO: 0.1 % (ref 0–1.5)
BILIRUB SERPL-MCNC: 0.5 MG/DL (ref 0–1.2)
BOTTLE TYPE: ABNORMAL
BUN SERPL-MCNC: 44 MG/DL (ref 8–23)
BUN/CREAT SERPL: 12.7 (ref 7–25)
CALCIUM SPEC-SCNC: 8.9 MG/DL (ref 8.6–10.5)
CHLORIDE SERPL-SCNC: 107 MMOL/L (ref 98–107)
CO2 SERPL-SCNC: 20 MMOL/L (ref 22–29)
CREAT SERPL-MCNC: 3.47 MG/DL (ref 0.76–1.27)
D-LACTATE SERPL-SCNC: 1.4 MMOL/L (ref 0.5–2)
DEPRECATED RDW RBC AUTO: 60.8 FL (ref 37–54)
EGFRCR SERPLBLD CKD-EPI 2021: 16.9 ML/MIN/1.73
EOSINOPHIL # BLD AUTO: 0.06 10*3/MM3 (ref 0–0.4)
EOSINOPHIL NFR BLD AUTO: 0.8 % (ref 0.3–6.2)
ERYTHROCYTE [DISTWIDTH] IN BLOOD BY AUTOMATED COUNT: 16.7 % (ref 12.3–15.4)
GEN 5 2HR TROPONIN T REFLEX: 117 NG/L
GLOBULIN UR ELPH-MCNC: 2.8 GM/DL
GLUCOSE SERPL-MCNC: 103 MG/DL (ref 65–99)
HCT VFR BLD AUTO: 33.7 % (ref 37.5–51)
HGB BLD-MCNC: 10.3 G/DL (ref 13–17.7)
IMM GRANULOCYTES # BLD AUTO: 0.05 10*3/MM3 (ref 0–0.05)
IMM GRANULOCYTES NFR BLD AUTO: 0.7 % (ref 0–0.5)
L PNEUMO1 AG UR QL IA: NEGATIVE
LYMPHOCYTES # BLD AUTO: 1.21 10*3/MM3 (ref 0.7–3.1)
LYMPHOCYTES NFR BLD AUTO: 15.9 % (ref 19.6–45.3)
MCH RBC QN AUTO: 30.3 PG (ref 26.6–33)
MCHC RBC AUTO-ENTMCNC: 30.6 G/DL (ref 31.5–35.7)
MCV RBC AUTO: 99.1 FL (ref 79–97)
MONOCYTES # BLD AUTO: 0.74 10*3/MM3 (ref 0.1–0.9)
MONOCYTES NFR BLD AUTO: 9.7 % (ref 5–12)
MRSA DNA SPEC QL NAA+PROBE: NORMAL
NEUTROPHILS NFR BLD AUTO: 5.55 10*3/MM3 (ref 1.7–7)
NEUTROPHILS NFR BLD AUTO: 72.8 % (ref 42.7–76)
NRBC BLD AUTO-RTO: 0 /100 WBC (ref 0–0.2)
NT-PROBNP SERPL-MCNC: 1395 PG/ML (ref 0–1800)
PLATELET # BLD AUTO: 133 10*3/MM3 (ref 140–450)
PMV BLD AUTO: 11.5 FL (ref 6–12)
POTASSIUM SERPL-SCNC: 4.8 MMOL/L (ref 3.5–5.2)
POTASSIUM SERPL-SCNC: 5 MMOL/L (ref 3.5–5.2)
PROCALCITONIN SERPL-MCNC: 0.33 NG/ML (ref 0–0.25)
PROT SERPL-MCNC: 5.8 G/DL (ref 6–8.5)
RBC # BLD AUTO: 3.4 10*6/MM3 (ref 4.14–5.8)
S PNEUM AG SPEC QL LA: NEGATIVE
SODIUM SERPL-SCNC: 136 MMOL/L (ref 136–145)
TROPONIN T DELTA: 6 NG/L
WBC NRBC COR # BLD AUTO: 7.62 10*3/MM3 (ref 3.4–10.8)

## 2024-08-27 PROCEDURE — G0378 HOSPITAL OBSERVATION PER HR: HCPCS

## 2024-08-27 PROCEDURE — 25810000003 SODIUM CHLORIDE 0.9 % SOLUTION: Performed by: INTERNAL MEDICINE

## 2024-08-27 PROCEDURE — 80053 COMPREHEN METABOLIC PANEL: CPT | Performed by: INTERNAL MEDICINE

## 2024-08-27 PROCEDURE — 99223 1ST HOSP IP/OBS HIGH 75: CPT | Performed by: STUDENT IN AN ORGANIZED HEALTH CARE EDUCATION/TRAINING PROGRAM

## 2024-08-27 PROCEDURE — 25010000002 ENOXAPARIN PER 10 MG: Performed by: INTERNAL MEDICINE

## 2024-08-27 PROCEDURE — 87641 MR-STAPH DNA AMP PROBE: CPT

## 2024-08-27 PROCEDURE — 87449 NOS EACH ORGANISM AG IA: CPT

## 2024-08-27 PROCEDURE — 84145 PROCALCITONIN (PCT): CPT

## 2024-08-27 PROCEDURE — 94761 N-INVAS EAR/PLS OXIMETRY MLT: CPT

## 2024-08-27 PROCEDURE — 25810000003 SODIUM CHLORIDE 0.9 % SOLUTION

## 2024-08-27 PROCEDURE — 94799 UNLISTED PULMONARY SVC/PX: CPT

## 2024-08-27 PROCEDURE — 83880 ASSAY OF NATRIURETIC PEPTIDE: CPT

## 2024-08-27 PROCEDURE — 87899 AGENT NOS ASSAY W/OPTIC: CPT

## 2024-08-27 PROCEDURE — 94640 AIRWAY INHALATION TREATMENT: CPT

## 2024-08-27 PROCEDURE — 85025 COMPLETE CBC W/AUTO DIFF WBC: CPT | Performed by: INTERNAL MEDICINE

## 2024-08-27 RX ORDER — TAMSULOSIN HYDROCHLORIDE 0.4 MG/1
0.4 CAPSULE ORAL NIGHTLY
Status: DISCONTINUED | OUTPATIENT
Start: 2024-08-27 | End: 2024-08-30 | Stop reason: HOSPADM

## 2024-08-27 RX ORDER — ONDANSETRON 2 MG/ML
4 INJECTION INTRAMUSCULAR; INTRAVENOUS EVERY 6 HOURS PRN
Status: DISCONTINUED | OUTPATIENT
Start: 2024-08-27 | End: 2024-08-30 | Stop reason: HOSPADM

## 2024-08-27 RX ORDER — ENOXAPARIN SODIUM 100 MG/ML
30 INJECTION SUBCUTANEOUS DAILY
Status: DISCONTINUED | OUTPATIENT
Start: 2024-08-27 | End: 2024-08-30 | Stop reason: HOSPADM

## 2024-08-27 RX ORDER — ISOSORBIDE MONONITRATE 30 MG/1
30 TABLET, EXTENDED RELEASE ORAL
Status: DISCONTINUED | OUTPATIENT
Start: 2024-08-27 | End: 2024-08-30 | Stop reason: HOSPADM

## 2024-08-27 RX ORDER — AMOXICILLIN 250 MG
2 CAPSULE ORAL 2 TIMES DAILY PRN
Status: DISCONTINUED | OUTPATIENT
Start: 2024-08-27 | End: 2024-08-30 | Stop reason: HOSPADM

## 2024-08-27 RX ORDER — PANTOPRAZOLE SODIUM 40 MG/1
40 TABLET, DELAYED RELEASE ORAL DAILY
Status: DISCONTINUED | OUTPATIENT
Start: 2024-08-27 | End: 2024-08-30 | Stop reason: HOSPADM

## 2024-08-27 RX ORDER — SODIUM CHLORIDE 0.9 % (FLUSH) 0.9 %
10 SYRINGE (ML) INJECTION EVERY 12 HOURS SCHEDULED
Status: DISCONTINUED | OUTPATIENT
Start: 2024-08-27 | End: 2024-08-30 | Stop reason: HOSPADM

## 2024-08-27 RX ORDER — MONTELUKAST SODIUM 10 MG/1
10 TABLET ORAL NIGHTLY
Status: DISCONTINUED | OUTPATIENT
Start: 2024-08-27 | End: 2024-08-30 | Stop reason: HOSPADM

## 2024-08-27 RX ORDER — SODIUM CHLORIDE 9 MG/ML
40 INJECTION, SOLUTION INTRAVENOUS AS NEEDED
Status: DISCONTINUED | OUTPATIENT
Start: 2024-08-27 | End: 2024-08-30 | Stop reason: HOSPADM

## 2024-08-27 RX ORDER — BISACODYL 10 MG
10 SUPPOSITORY, RECTAL RECTAL DAILY PRN
Status: DISCONTINUED | OUTPATIENT
Start: 2024-08-27 | End: 2024-08-30 | Stop reason: HOSPADM

## 2024-08-27 RX ORDER — BUDESONIDE AND FORMOTEROL FUMARATE DIHYDRATE 160; 4.5 UG/1; UG/1
2 AEROSOL RESPIRATORY (INHALATION)
Status: DISCONTINUED | OUTPATIENT
Start: 2024-08-27 | End: 2024-08-30 | Stop reason: HOSPADM

## 2024-08-27 RX ORDER — METOPROLOL SUCCINATE 50 MG/1
50 TABLET, EXTENDED RELEASE ORAL DAILY
Status: DISCONTINUED | OUTPATIENT
Start: 2024-08-27 | End: 2024-08-30 | Stop reason: HOSPADM

## 2024-08-27 RX ORDER — SODIUM CHLORIDE 0.9 % (FLUSH) 0.9 %
10 SYRINGE (ML) INJECTION AS NEEDED
Status: DISCONTINUED | OUTPATIENT
Start: 2024-08-27 | End: 2024-08-30 | Stop reason: HOSPADM

## 2024-08-27 RX ORDER — ASPIRIN 81 MG/1
81 TABLET ORAL DAILY
Status: DISCONTINUED | OUTPATIENT
Start: 2024-08-27 | End: 2024-08-30 | Stop reason: HOSPADM

## 2024-08-27 RX ORDER — ATORVASTATIN CALCIUM 10 MG/1
20 TABLET, FILM COATED ORAL NIGHTLY
Status: DISCONTINUED | OUTPATIENT
Start: 2024-08-27 | End: 2024-08-30 | Stop reason: HOSPADM

## 2024-08-27 RX ORDER — ACETAMINOPHEN 160 MG/5ML
650 SOLUTION ORAL EVERY 4 HOURS PRN
Status: DISCONTINUED | OUTPATIENT
Start: 2024-08-27 | End: 2024-08-30 | Stop reason: HOSPADM

## 2024-08-27 RX ORDER — POLYETHYLENE GLYCOL 3350 17 G/17G
17 POWDER, FOR SOLUTION ORAL DAILY PRN
Status: DISCONTINUED | OUTPATIENT
Start: 2024-08-27 | End: 2024-08-30 | Stop reason: HOSPADM

## 2024-08-27 RX ORDER — SODIUM CHLORIDE 9 MG/ML
50 INJECTION, SOLUTION INTRAVENOUS CONTINUOUS
Status: DISCONTINUED | OUTPATIENT
Start: 2024-08-27 | End: 2024-08-28

## 2024-08-27 RX ORDER — CETIRIZINE HYDROCHLORIDE 10 MG/1
10 TABLET ORAL DAILY
Status: DISCONTINUED | OUTPATIENT
Start: 2024-08-27 | End: 2024-08-30 | Stop reason: HOSPADM

## 2024-08-27 RX ORDER — ALLOPURINOL 100 MG/1
100 TABLET ORAL
Status: DISCONTINUED | OUTPATIENT
Start: 2024-08-27 | End: 2024-08-30 | Stop reason: HOSPADM

## 2024-08-27 RX ORDER — LOSARTAN POTASSIUM 50 MG/1
50 TABLET ORAL DAILY
Status: DISCONTINUED | OUTPATIENT
Start: 2024-08-27 | End: 2024-08-30 | Stop reason: HOSPADM

## 2024-08-27 RX ORDER — ISOSORBIDE MONONITRATE 30 MG/1
30 TABLET, EXTENDED RELEASE ORAL DAILY
COMMUNITY

## 2024-08-27 RX ORDER — MULTIPLE VITAMINS W/ MINERALS TAB 9MG-400MCG
1 TAB ORAL DAILY
Status: DISCONTINUED | OUTPATIENT
Start: 2024-08-27 | End: 2024-08-30 | Stop reason: HOSPADM

## 2024-08-27 RX ORDER — BISACODYL 5 MG/1
5 TABLET, DELAYED RELEASE ORAL DAILY PRN
Status: DISCONTINUED | OUTPATIENT
Start: 2024-08-27 | End: 2024-08-30 | Stop reason: HOSPADM

## 2024-08-27 RX ORDER — SODIUM BICARBONATE 650 MG/1
650 TABLET ORAL 3 TIMES DAILY
Status: DISCONTINUED | OUTPATIENT
Start: 2024-08-27 | End: 2024-08-30 | Stop reason: HOSPADM

## 2024-08-27 RX ADMIN — SODIUM CHLORIDE 100 ML/HR: 9 INJECTION, SOLUTION INTRAVENOUS at 03:20

## 2024-08-27 RX ADMIN — Medication 10 MG: at 20:07

## 2024-08-27 RX ADMIN — ASPIRIN 81 MG: 81 TABLET, COATED ORAL at 08:36

## 2024-08-27 RX ADMIN — Medication 1 TABLET: at 08:34

## 2024-08-27 RX ADMIN — SODIUM BICARBONATE 650 MG: 650 TABLET ORAL at 08:33

## 2024-08-27 RX ADMIN — BUDESONIDE AND FORMOTEROL FUMARATE DIHYDRATE 2 PUFF: 160; 4.5 AEROSOL RESPIRATORY (INHALATION) at 20:02

## 2024-08-27 RX ADMIN — TAMSULOSIN HYDROCHLORIDE 0.4 MG: 0.4 CAPSULE ORAL at 20:03

## 2024-08-27 RX ADMIN — SODIUM BICARBONATE 650 MG: 650 TABLET ORAL at 20:03

## 2024-08-27 RX ADMIN — DOXYCYCLINE 100 MG: 100 INJECTION, POWDER, LYOPHILIZED, FOR SOLUTION INTRAVENOUS at 06:51

## 2024-08-27 RX ADMIN — MONTELUKAST SODIUM 10 MG: 10 TABLET ORAL at 20:03

## 2024-08-27 RX ADMIN — METOPROLOL SUCCINATE 50 MG: 50 TABLET, EXTENDED RELEASE ORAL at 08:34

## 2024-08-27 RX ADMIN — CETIRIZINE HYDROCHLORIDE 10 MG: 10 TABLET ORAL at 08:34

## 2024-08-27 RX ADMIN — ISOSORBIDE MONONITRATE 30 MG: 30 TABLET, EXTENDED RELEASE ORAL at 11:40

## 2024-08-27 RX ADMIN — PANTOPRAZOLE SODIUM 40 MG: 40 TABLET, DELAYED RELEASE ORAL at 08:36

## 2024-08-27 RX ADMIN — LOSARTAN POTASSIUM 50 MG: 50 TABLET, FILM COATED ORAL at 08:34

## 2024-08-27 RX ADMIN — Medication 10 ML: at 08:33

## 2024-08-27 RX ADMIN — Medication 10 ML: at 20:03

## 2024-08-27 RX ADMIN — DRONEDARONE 400 MG: 400 TABLET, FILM COATED ORAL at 08:34

## 2024-08-27 RX ADMIN — ENOXAPARIN SODIUM 30 MG: 100 INJECTION SUBCUTANEOUS at 08:36

## 2024-08-27 RX ADMIN — ATORVASTATIN CALCIUM 20 MG: 10 TABLET, FILM COATED ORAL at 20:03

## 2024-08-27 RX ADMIN — SODIUM CHLORIDE 50 ML/HR: 9 INJECTION, SOLUTION INTRAVENOUS at 16:12

## 2024-08-27 RX ADMIN — Medication 10 ML: at 03:20

## 2024-08-27 NOTE — PLAN OF CARE
Goal Outcome Evaluation:                 Patient admitted from ER, c/o shortness of air. Had narrow complex tachycardia requiring cardioverison, felt immensely better after returning to NSR per patient. Arrived to unit on 4 liter NC, at this time is down to 3 liters, home dose is 2 liters at rest and 4 liters with exertions. Remains in NSR. Denies pain, edema present to lower extremities, warmth and redness present to legs and angles. NS going as ordered, NPO awaiting EP decision on procedure. Patient oriented and pleasant.

## 2024-08-27 NOTE — PROGRESS NOTES
84-year old man who was admitted by Dr. Malcolm after midnight.  Patient presented with shortness of breath.  He was just in the hospital from August due to August 8 under the care of Dr. Montilla.    Final Discharge Diagnoses:       Active Hospital Problems     Diagnosis      **Acute on chronic respiratory failure with hypoxia      Chronic heart failure with preserved ejection fraction (HFpEF)      Chronic diastolic congestive heart failure      COPD with acute exacerbation      S/P radiation > 12 weeks      Bilateral lower extremity edema      Former smoker      Hyperkalemia      Pulmonary fibrosis      Stage 3b chronic kidney disease      Malignant neoplasm of upper lobe of right lung        Hyperkalemia is now resolved at 4.8  His record indicates chronic kidney disease stage IIIb.  His creatinine when he came in was 3.78 and now has improved to 3.47  He has macrocytic anemia    Results for orders placed during the hospital encounter of 06/29/24    Adult Stress Echo W/ Cont or Stress Agent if Necessary Per Protocol    Interpretation Summary    Overall, this is an intermediate risk test for ischemia.    No ECG evidence of myocardial ischemia. Negative clinical evidence of myocardial ischemia. Findings consistent with a normal ECG stress test.    Abnormal stress echo consistent with an intermediate risk study for myocardial ischemia.    Left ventricular systolic function is normal. Left ventricular ejection fraction appears to be 61 - 65%.    The following left ventricular wall segments are hypokinetic: apical septal and apex hypokinetic.    Dr. Bowen reached out to me around 8:40 AM.  At that time he was still contemplating on best course of action.  He was thinking of AV rogelio ablation.  Currently patient is back to normal sinus rhythm with rate of 72.  Patient has no new complaints  Continue current plan of care.

## 2024-08-27 NOTE — CONSULTS
[facility] HEART GROUP CONSULT NOTE    Referring Provider: Jen Peñaloza DO    Reason for Consultation:  recurrent afib     Chief complaint:   Chief Complaint   Patient presents with    Shortness of Breath    Rapid Heart Rate       Subjective .   EP Problems:  1.  Paroxysmal SVT  2.  Wide-complex tachycardia 2019  3.  Paroxysmal atrial fibrillation     Cardiology Problems:  1.  Hypertension     Medical Problems:  1.  Metastatic lung cancer  2.  Pneumothorax  3.  COPD on home oxygen  4.  GERD  5.  CKD  6.  Recurrent bleeding duodenal ulcer        History of present illness:  Karoline Prater is a 84 y.o. male with history of PAF, WCT/aberrancy, and metastatic lung who we are consulted on for recurrent atrial arrhythmias. He was last seen during a previous admission in July for SVT and was recommended to start on Multaq. He was due to follow up last week in EP clinic, but had to cancel. Currently, he is under surveillance for previous metastatic lung CA s/p radiation therapy. He has a stable spiculated nodule in the right apex, but his RUL mass appeared to have increased. Due to this change, he was recommended to have PET scan.     He has increased SOB and hypoxia with O2 level at 90%, that was worse with exertion. When walking across the room, he felt like he would pass out, but denied any falls or vision loss. He had to increase his oxygen to 2.5-3L.  He presented to the ER today due to weakness and SOB. EKG showed narrow complex tachycardia and he was noted to hypotensive as well, which prompted cardioversion by ER staff.     Troponins were elevated at 111, 117, but he denied any chest pain, no ischemic changes were noted on EKG.     Currently he is sleeping, but awake to voice, in SR at 80s. He tolerated the multaq well without any side effects. However, this breakthrough is now his first episode of recurrent arrhythmia.       History  Past Medical History:   Diagnosis Date    Arthritis      Atrial fibrillation with rapid ventricular response 05/31/2019    Blindness of left eye     BPH with obstruction/lower urinary tract symptoms     Cancer     stomach & lung    CHF (congestive heart failure)     CKD (chronic kidney disease) stage 3, GFR 30-59 ml/min     COPD with acute exacerbation 8/3/2024    Coronary artery disease     Elevated cholesterol     Essential hypertension 10/02/2017    Fibrotic lung diseases     GERD (gastroesophageal reflux disease)     Hearing loss     History of transfusion     Hydronephrosis of left kidney     Hyperlipidemia     Hypertension     pt was taken off of all of his medications for BP (atenolol, lisinopril, lasix) because his BP kept bottoming out so his primary dr told him to discontinue them 1-2 months ago (Jan/Feb 2019). pt states he takes no medications currently.    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 Patel is just going to keep an eye on it and not do surgery right now.    Mediastinal adenopathy 10/24/2018    [facility]    Monoclonal gammopathy of unknown significance (MGUS) 09/11/2018    Pancreatic mass     pt states he had this in 2013 but it went away on its own. Now recent CT shows it has come back so he is going to have an ultrasound on 3/13/19.    Shortness of breath    ,   Past Surgical History:   Procedure Laterality Date    ABDOMINAL SURGERY      BRONCHOSCOPY N/A 10/24/2018    Procedure: BRONCHOSCOPY WITH BIOPSY, EBUS;  Surgeon: Gareth Becerra MD;  Location: [facility] OR;  Service: Pulmonary    CHOLECYSTECTOMY      COLONOSCOPY N/A 1/3/2019    Procedure: COLONOSCOPY WITH ANESTHESIA;  Surgeon: Randy Somers DO;  Location: [facility] ENDOSCOPY;  Service: Gastroenterology    CYSTOSCOPY, RETROGRADE PYELOGRAM AND STENT INSERTION Left 3/8/2019    Procedure: CYSTOSCOPY RETROGRADE BILATERAL PYELOGRAM;   Surgeon: Jos Sylvester MD;  Location: [facility] OR;  Service: Urology    ENDOSCOPY N/A 2018    Procedure: ESOPHAGOGASTRODUODENOSCOPY WITH ANESTHESIA;  Surgeon: Randy Somers DO;  Location:  [facility] ENDOSCOPY;  Service: Gastroenterology    ENDOSCOPY N/A 2019    Procedure: ESOPHAGOGASTRODUODENOSCOPY WITH ANESTHESIA;  Surgeon: Lilliam Jj MD;  Location:  [facility] OR;  Service: Gastroenterology    ENDOSCOPY N/A 2019    Procedure: ESOPHAGOGASTRODUODENOSCOPY WITH ANESTHESIA;  Surgeon: Pilo Bansal MD;  Location:  [facility] ENDOSCOPY;  Service: Gastroenterology    EYE SURGERY Left 1964    FOOT SURGERY Right 1966    joint    FRACTURE SURGERY     ,   Family History   Problem Relation Age of Onset    Hypertension Mother     Leukemia Father    ,   Social History     Tobacco Use    Smoking status: Former     Current packs/day: 0.00     Types: Cigarettes     Start date: 10/29/1954     Quit date: 10/29/2003     Years since quittin.8    Smokeless tobacco: Former     Types: Chew     Quit date:    Vaping Use    Vaping status: Never Used   Substance Use Topics    Alcohol use: No    Drug use: No   ,     Medications      Current Facility-Administered Medications:     acetaminophen (TYLENOL) 160 MG/5ML oral solution 650 mg, 650 mg, Oral, Q4H PRN, Jen Peñaloza DO    allopurinol (ZYLOPRIM) tablet 100 mg, 100 mg, Oral, Daily Before Lunch, Jen Peñaloza DO    aspirin EC tablet 81 mg, 81 mg, Oral, Daily, Jen Peñaloza DO, 81 mg at 24 0836    atorvastatin (LIPITOR) tablet 20 mg, 20 mg, Oral, Nightly, Jen Peñaloza DO    sennosides-docusate (PERICOLACE) 8.6-50 MG per tablet 2 tablet, 2 tablet, Oral, BID PRN **AND** polyethylene glycol (MIRALAX) packet 17 g, 17 g, Oral, Daily PRN **AND** bisacodyl (DULCOLAX) EC tablet 5 mg, 5 mg, Oral, Daily PRN **AND** bisacodyl (DULCOLAX) suppository 10 mg, 10 mg, Rectal, Daily PRN, Jh, Ali Danielle, DO    budesonide-formoterol (SYMBICORT)  160-4.5 MCG/ACT inhaler 2 puff, 2 puff, Inhalation, BID - RT, Jen Peñaloza DO    cefTRIAXone (ROCEPHIN) 1,000 mg in sodium chloride 0.9 % 100 mL MBP, 1,000 mg, Intravenous, Q24H, Jen Peñaloza DO    cetirizine (zyrTEC) tablet 10 mg, 10 mg, Oral, Daily, Jen Peñaloza DO, 10 mg at 08/27/24 0834    doxycycline (VIBRAMYCIN) 100 mg in sodium chloride 0.9 % 100 mL MBP, 100 mg, Intravenous, Q12H, Jen Peñaloza DO, 100 mg at 08/27/24 0651    dronedarone (MULTAQ) tablet 400 mg, 400 mg, Oral, Q12H, Jen Peñaloza DO, 400 mg at 08/27/24 0834    Enoxaparin Sodium (LOVENOX) syringe 30 mg, 30 mg, Subcutaneous, Daily, Jen Peñaloza DO, 30 mg at 08/27/24 0836    isosorbide mononitrate (IMDUR) 24 hr tablet 30 mg, 30 mg, Oral, Daily Before Lunch, Jen Peñaloza DO    losartan (COZAAR) tablet 50 mg, 50 mg, Oral, Daily, Jen Peñaloza DO, 50 mg at 08/27/24 0834    melatonin tablet 10 mg, 10 mg, Oral, Nightly PRN, Jen Peñaloza DO    metoprolol succinate XL (TOPROL-XL) 24 hr tablet 50 mg, 50 mg, Oral, Daily, Jen Peñaloza DO, 50 mg at 08/27/24 0834    montelukast (SINGULAIR) tablet 10 mg, 10 mg, Oral, Nightly, Jen Peñaloza DO    multivitamin with minerals 1 tablet, 1 tablet, Oral, Daily, Jen Peñaloza DO, 1 tablet at 08/27/24 0834    ondansetron (ZOFRAN) injection 4 mg, 4 mg, Intravenous, Q6H PRN, Jen Peñaloza DO    pantoprazole (PROTONIX) EC tablet 40 mg, 40 mg, Oral, Daily, Jen Peñaloza DO, 40 mg at 08/27/24 0836    sodium bicarbonate tablet 650 mg, 650 mg, Oral, TID, Jen Peñaloza DO, 650 mg at 08/27/24 0833    sodium chloride 0.9 % flush 10 mL, 10 mL, Intravenous, Q12H, Jen Peñaloza DO, 10 mL at 08/27/24 0833    sodium chloride 0.9 % flush 10 mL, 10 mL, Intravenous, PRN, Jen Peñaloza DO    sodium chloride 0.9 % infusion 40 mL, 40 mL, Intravenous, PRN, Jen Peñaloza DO    sodium chloride 0.9 % infusion, 100 mL/hr,  Intravenous, Continuous, Jen Peñaloza DO, Last Rate: 100 mL/hr at 08/27/24 0320, 100 mL/hr at 08/27/24 0320    tamsulosin (FLOMAX) 24 hr capsule 0.4 mg, 0.4 mg, Oral, Nightly, Jen Peñaloza DO    Current Outpatient Medications:     acetaminophen (TYLENOL) 500 MG tablet, Take 2 tablets by mouth Every Night., Disp: , Rfl:     albuterol sulfate  (90 Base) MCG/ACT inhaler, Inhale 2 puffs Every 4 (Four) Hours As Needed for Wheezing., Disp: , Rfl:     allopurinol (ZYLOPRIM) 100 MG tablet, Take 1 tablet by mouth Daily Before Lunch. Takes at lunch, Disp: , Rfl:     aspirin 81 MG EC tablet, Take 1 tablet by mouth Daily., Disp: 90 tablet, Rfl: 0    atorvastatin (LIPITOR) 20 MG tablet, Take 1 tablet by mouth Every Night., Disp: 90 tablet, Rfl: 0    bumetanide (BUMEX) 0.5 MG tablet, Take 1 tablet by mouth Daily for 90 days., Disp: 30 tablet, Rfl: 2    cetirizine (zyrTEC) 10 MG tablet, Take 1 tablet by mouth Daily., Disp: 30 tablet, Rfl: 5    dronedarone (MULTAQ) 400 MG tablet, Take 1 tablet by mouth Every 12 (Twelve) Hours., Disp: 60 tablet, Rfl: 2    fluticasone (FLONASE) 50 MCG/ACT nasal spray, 1 spray into the nostril(s) as directed by provider Daily., Disp: , Rfl:     isosorbide mononitrate (IMDUR) 30 MG 24 hr tablet, Take 1 tablet by mouth Daily. (Patient taking differently: Take 1 tablet by mouth Daily Before Lunch.), Disp: 90 tablet, Rfl: 0    losartan (COZAAR) 50 MG tablet, Take 1 tablet by mouth Daily., Disp: , Rfl:     melatonin 5 MG tablet tablet, Take 2 tablets by mouth Every Night., Disp: , Rfl:     metoprolol succinate XL (Toprol XL) 50 MG 24 hr tablet, Take 1 tablet by mouth Daily., Disp: 90 tablet, Rfl: 0    montelukast (SINGULAIR) 10 MG tablet, Take 1 tablet by mouth Every Night., Disp: , Rfl:     multivitamin with minerals tablet tablet, Take 1 tablet by mouth Daily., Disp: , Rfl:     pantoprazole (PROTONIX) 40 MG EC tablet, Take 1 tablet by mouth Daily., Disp: 30 tablet, Rfl: 1    sodium  "bicarbonate 650 MG tablet, Take 1 tablet by mouth 3 (Three) Times a Day., Disp: , Rfl:     tamsulosin (FLOMAX) 0.4 MG capsule 24 hr capsule, Take 1 capsule by mouth Every Night., Disp: , Rfl:     tiotropium bromide-olodaterol (Stiolto Respimat) 2.5-2.5 MCG/ACT aerosol solution inhaler, Inhale 2 puffs Daily., Disp: 4 g, Rfl: 5    Allergies:  Penicillins    Review of Systems  Review of Systems   Constitutional: Positive for malaise/fatigue.   HENT: Negative.     Eyes: Negative.    Cardiovascular:  Positive for dyspnea on exertion and near-syncope.   Respiratory:  Positive for shortness of breath.    Endocrine: Negative.    Hematologic/Lymphatic: Negative.    Skin: Negative.    Musculoskeletal: Negative.    Gastrointestinal: Negative.    Genitourinary: Negative.    Neurological: Negative.    Psychiatric/Behavioral: Negative.         Objective     Physical Exam:  Visit Vitals  /95 (BP Location: Right arm, Patient Position: Sitting)   Pulse 73   Temp 98.3 °F (36.8 °C) (Oral)   Resp 19   Ht 162.6 cm (64\")   Wt 74.8 kg (164 lb 14.5 oz)   SpO2 94%   BMI 28.31 kg/m²       Vitals reviewed.   Constitutional:       General: Sleeping.      Appearance: Not in distress. Frail.      Comments: Awoke to stimuli    Eyes:      Extraocular Movements: Extraocular movements intact.      Conjunctiva/sclera: Conjunctivae normal.      Pupils: Pupils are equal, round, and reactive to light.      Comments: Left eye absent    HENT:      Head: Normocephalic and atraumatic.      Nose: Nose normal.    Mouth/Throat:      Lips: Pink.      Mouth: Mucous membranes are moist.      Pharynx: Oropharynx is clear.   Neck:      Vascular: No carotid bruit or JVD. JVD normal.   Pulmonary:      Effort: Pulmonary effort is normal.      Breath sounds: Rhonchi present.      Comments: Coarse BS  Chest:      Chest wall: Not tender to palpatation.   Cardiovascular:      PMI at left midclavicular line. Normal rate. Regular rhythm. Normal S1. Normal S2.       " Murmurs: There is no murmur.      No gallop.  No rub.      Comments: Mild erythematous venous stasis changes   Pulses:     Radial: 1+ bilaterally.  Edema:     Peripheral edema present.     Pretibial: bilateral trace edema of the pretibial area.     Ankle: bilateral trace edema of the ankle.  Abdominal:      General: Bowel sounds are normal.   Musculoskeletal:      Extremities: No clubbing present.     Cervical back: Normal range of motion. Skin:     General: Skin is warm and dry.   Neurological:      Mental Status: Oriented to person, place, and time.   Psychiatric:         Attention and Perception: Attention normal.         Mood and Affect: Affect normal.         Speech: Speech normal.         Behavior: Behavior normal.         Cognition and Memory: Cognition normal.           Results Review:   I reviewed the patient's new clinical results.  Lab Results   Component Value Date    GLUCOSE 103 (H) 08/27/2024    CALCIUM 8.9 08/27/2024     08/27/2024    K 4.8 08/27/2024    CO2 20.0 (L) 08/27/2024     08/27/2024    BUN 44 (H) 08/27/2024    CREATININE 3.47 (H) 08/27/2024    EGFR 16.9 (L) 08/27/2024    BCR 12.7 08/27/2024    ANIONGAP 9.0 08/27/2024           Lab Results   Component Value Date    ECHOEFEST 55 07/24/2020       Imaging Results (Last 72 Hours)       Procedure Component Value Units Date/Time    XR Chest 1 View [[id number]] Collected: 08/26/24 2203     Updated: 08/26/24 2209    Narrative:      EXAM/TECHNIQUE: XR CHEST 1 VW-     INDICATION: dyspnea     COMPARISON: 8/7/2024, 8/20/2024     FINDINGS:     Resuscitation pads overlie the chest. Cardiac silhouette is mildly  enlarged but stable. Right chest port with catheter tip overlying the  SVC.     Redemonstrated diffuse interstitial fibrotic lung disease.  Similar-appearing right sided pleural thickening. Redemonstrated patchy  airspace opacities in the right upper and lower lung. The right upper  lobe opacity appears slightly increased. No large  pleural effusion. No  visible pneumothorax.     No acute osseous finding.       Impression:         Redemonstrated patchy airspace opacities in the right upper and lower  lung, with appearance of slight worsening of the right upper lobe  opacity. No change in right-sided pleural thickening. Diffuse pulmonary  fibrosis is again noted.     This report was signed and finalized on 8/26/2024 10:06 PM by Dr. Claudio De Santiago MD.                       Assessment & Plan       Narrow complex tachycardia    A-fib    Metastatic adenocarcinoma of the RUL    CHF (congestive heart failure)      Recurrent narrow complex tachycardia: This is his first episode of SVT since starting Multaq that he knows of. His main complaints were SOB, hypoxia and presyncope, but was not aware of tachypalpitations. He required cardioversion once in ER due to hypotension and now is resting comfortably in bed.   -Discussed possibility of AVN ablation with pacemaker implant. Afib with PVI ablation is not option given that he has not been anticoagulated due to previous GIB. Furthermore, his lung disease makes him high risk for general anesthesia.   -Amiodarone is not ideal with lung disease  -Flecainide not ideal with intermediate stress echo in July and hypokinetic walls, renal failure.   -Tikosyn/Sotalol not possible due to acute/chronic renal failure     Acute/Chronic renal failure: Given his increase in Creatinine up to 3.4, will hold Multaq.     Will keep patient NPO for potential procedure. Discussed procedure of AVN ablation with patient and he is overall agreeable to the procedure if felt to be best option to minimize symptoms.     Further orders per Dr. Bowen     Thank you for asking us to follow this patient with you.       Electronically signed by PATRICA Levin, 08/27/24, 8:48 AM CDT.

## 2024-08-27 NOTE — H&P
[facility]  HISTORY AND PHYSICAL    Date of Admission: 8/26/2024  Primary Care Physician: Irving Alexis MD    Subjective   Primary Historian: Patient    Chief Complaint: SOA    Shortness of Breath    84-year-old gentleman who presents emergency department shortness of breath.  He ED physician noted the patient had a tacky arrhythmia.  The patient was cardioverted.  My exam he was improved with a heart rate in the 80s.  The patient stated that he was short of breath that was worse with activity.  It started yesterday.  He had no chest pain.  He has bilateral lower extremity edema.  Previous creatinine was 2.12 and today was found to be 3.78.  No acute bowel or kidney changes.       Date of Admission: 8/2/2024  Date of Discharge:  8/8/2024  Primary Care Physician: Irving Alexis MD     Presenting Problem/History of Present Illness:  Shortness of breath, leg swelling     Final Discharge Diagnoses:       Active Hospital Problems     Diagnosis      **Acute on chronic respiratory failure with hypoxia      Chronic heart failure with preserved ejection fraction (HFpEF)      Chronic diastolic congestive heart failure      COPD with acute exacerbation      S/P radiation > 12 weeks      Bilateral lower extremity edema      Former smoker      Hyperkalemia      Pulmonary fibrosis      Stage 3b chronic kidney disease      Malignant neoplasm of upper lobe of right lung      Cardiac echo 7/4/2024:    Interpretation Summary    Left ventricular systolic function is normal. Left ventricular ejection fraction appears to be 61 - 65%.    Left ventricular diastolic dysfunction is noted.    Normal right ventricular cavity size and systolic function noted.    There is mild thickening of the aortic valve. No aortic valve regurgitation is present. No hemodynamically significant aortic valve stenosis is present.    No mitral valve regurgitation or significant stenosis is  present. Mild mitral annular calcification is present.    Mild pulmonary hypertension is present.    Review of Systems   Respiratory:  Positive for shortness of breath.       Otherwise complete ROS reviewed and negative except as mentioned in the HPI.    Past Medical History:   Past Medical History:   Diagnosis Date    Arthritis     Atrial fibrillation with rapid ventricular response 05/31/2019    Blindness of left eye     BPH with obstruction/lower urinary tract symptoms     Cancer     stomach & lung    CHF (congestive heart failure)     CKD (chronic kidney disease) stage 3, GFR 30-59 ml/min     COPD with acute exacerbation 8/3/2024    Coronary artery disease     Elevated cholesterol     Essential hypertension 10/02/2017    Fibrotic lung diseases     GERD (gastroesophageal reflux disease)     Hearing loss     History of transfusion     Hydronephrosis of left kidney     Hyperlipidemia     Hypertension     pt was taken off of all of his medications for BP (atenolol, lisinopril, lasix) because his BP kept bottoming out so his primary dr told him to discontinue them 1-2 months ago (Jan/Feb 2019). pt states he takes no medications currently.    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 Patel is just going to keep an eye on it and not do surgery right now.    Mediastinal adenopathy 10/24/2018    [facility]    Monoclonal gammopathy of unknown significance (MGUS) 09/11/2018    Pancreatic mass     pt states he had this in 2013 but it went away on its own. Now recent CT shows it has come back so he is going to have an ultrasound on 3/13/19.    Shortness of breath      Past Surgical History:  Past Surgical History:   Procedure Laterality Date    ABDOMINAL SURGERY      BRONCHOSCOPY N/A 10/24/2018    Procedure: BRONCHOSCOPY WITH BIOPSY, EBUS;  Surgeon: Gareth Becerra,  MD;  Location: [facility] OR;  Service: Pulmonary    CHOLECYSTECTOMY      COLONOSCOPY N/A 1/3/2019    Procedure: COLONOSCOPY WITH ANESTHESIA;  Surgeon: Randy Somers DO;  Location: [facility] ENDOSCOPY;  Service: Gastroenterology    CYSTOSCOPY, RETROGRADE PYELOGRAM AND STENT INSERTION Left 3/8/2019    Procedure: CYSTOSCOPY RETROGRADE BILATERAL PYELOGRAM;  Surgeon: Jos Sylvester MD;  Location: [facility] OR;  Service: Urology    ENDOSCOPY N/A 12/11/2018    Procedure: ESOPHAGOGASTRODUODENOSCOPY WITH ANESTHESIA;  Surgeon: Randy Somers DO;  Location: [facility] ENDOSCOPY;  Service: Gastroenterology    ENDOSCOPY N/A 4/29/2019    Procedure: ESOPHAGOGASTRODUODENOSCOPY WITH ANESTHESIA;  Surgeon: Lilliam Jj MD;  Location: [facility] OR;  Service: Gastroenterology    ENDOSCOPY N/A 5/9/2019    Procedure: ESOPHAGOGASTRODUODENOSCOPY WITH ANESTHESIA;  Surgeon: Pilo Bansal MD;  Location: [facility] ENDOSCOPY;  Service: Gastroenterology    EYE SURGERY Left 1964    FOOT SURGERY Right 1966    joint    FRACTURE SURGERY       Social History:  reports that he quit smoking about 20 years ago. His smoking use included cigarettes. He started smoking about 69 years ago. He quit smokeless tobacco use about 54 years ago.  His smokeless tobacco use included chew. He reports that he does not drink alcohol and does not use drugs.    Family History: family history includes Hypertension in his mother; Leukemia in his father.       Allergies:  Allergies   Allergen Reactions    Penicillins Hives       Medications:  Prior to Admission medications    Medication Sig Start Date End Date Taking? Authorizing Provider   acetaminophen (TYLENOL) 500 MG tablet Take 2 tablets by mouth Every Night.    ProviderEliecer MD   albuterol sulfate  (90 Base) MCG/ACT inhaler Inhale 2 puffs Every 4 (Four) Hours As Needed for Wheezing.    ProviderEliecer MD   allopurinol (ZYLOPRIM) 100 MG tablet Take 1 tablet by mouth Daily Before Lunch. Takes at  lunch    Eliecer Schultz MD   aspirin 81 MG EC tablet Take 1 tablet by mouth Daily. 7/4/24   Zachary Lloyd MD   atorvastatin (LIPITOR) 20 MG tablet Take 1 tablet by mouth Every Night. 7/3/24   Zachary Lloyd MD   bumetanide (BUMEX) 0.5 MG tablet Take 1 tablet by mouth Daily for 90 days. 8/8/24 11/6/24  Farida APRN   cetirizine (zyrTEC) 10 MG tablet Take 1 tablet by mouth Daily. 8/8/24   Cornelio Gordon DO   dronedarone (MULTAQ) 400 MG tablet Take 1 tablet by mouth Every 12 (Twelve) Hours. 7/4/24   Zachary Lloyd MD   fluticasone (FLONASE) 50 MCG/ACT nasal spray 1 spray into the nostril(s) as directed by provider Daily.    Eliecer Schultz MD   isosorbide mononitrate (IMDUR) 30 MG 24 hr tablet Take 1 tablet by mouth Daily.  Patient taking differently: Take 1 tablet by mouth Daily Before Lunch. 7/4/24   Zachary Lloyd MD   losartan (COZAAR) 50 MG tablet Take 1 tablet by mouth Daily.    Eliecer Schultz MD   melatonin 5 MG tablet tablet Take 2 tablets by mouth Every Night.    Eliecer Schultz MD   metoprolol succinate XL (Toprol XL) 50 MG 24 hr tablet Take 1 tablet by mouth Daily. 7/4/24   Zachary Lloyd MD   montelukast (SINGULAIR) 10 MG tablet Take 1 tablet by mouth Every Night.    Eliecer Schultz MD   multivitamin with minerals tablet tablet Take 1 tablet by mouth Daily.    Eliecer Schultz MD   pantoprazole (PROTONIX) 40 MG EC tablet Take 1 tablet by mouth Daily. 4/30/19   Hang Reddy DO   sodium bicarbonate 650 MG tablet Take 1 tablet by mouth 3 (Three) Times a Day.    Eliecer Schultz MD   tamsulosin (FLOMAX) 0.4 MG capsule 24 hr capsule Take 1 capsule by mouth Every Night.    Eliecer Schultz MD   tiotropium bromide-olodaterol (Stiolto Respimat) 2.5-2.5 MCG/ACT aerosol solution inhaler Inhale 2 puffs Daily. 8/8/24   Cornelio Gordon, DO     I have utilized all available immediate resources to obtain, update, or review the  "patient's current medications (including all prescriptions, over-the-counter products, herbals, cannabis/cannabidiol products, and vitamin/mineral/dietary (nutritional) supplements).    Objective     Vital Signs: BP (!) 88/59   Pulse 83   Temp 98.2 °F (36.8 °C)   Resp 20   Ht 162.6 cm (64\")   Wt 74.8 kg (164 lb 14.5 oz)   SpO2 98%   BMI 28.31 kg/m²   Physical Exam  Vitals reviewed.   Constitutional:       Appearance: Normal appearance.   HENT:      Head: Normocephalic and atraumatic.      Left Ear: External ear normal.      Nose: Nose normal.   Eyes:      Conjunctiva/sclera: Conjunctivae normal.      Comments: ABN left eye   Cardiovascular:      Rate and Rhythm: Normal rate and regular rhythm.      Heart sounds: Normal heart sounds.   Pulmonary:      Effort: Pulmonary effort is normal.      Breath sounds: Normal breath sounds.   Abdominal:      General: There is no distension.      Palpations: Abdomen is soft.   Musculoskeletal:         General: Swelling present. No tenderness.      Cervical back: Normal range of motion and neck supple.      Right lower leg: Edema present.      Left lower leg: Edema present.      Comments: +2 pitting LE edema   Skin:     General: Skin is warm and dry.   Neurological:      Mental Status: He is alert and oriented to person, place, and time.      Cranial Nerves: No cranial nerve deficit.   Psychiatric:         Mood and Affect: Mood normal.         Behavior: Behavior normal.          Results Reviewed:  Lab Results (last 24 hours)       Procedure Component Value Units Date/Time    High Sensitivity Troponin T 2Hr [[id number]]  (Abnormal) Collected: 08/26/24 2348    Specimen: Blood Updated: 08/27/24 0031     HS Troponin T 117 ng/L      Troponin T Delta 6 ng/L     Narrative:      High Sensitive Troponin T Reference Range:  <14.0 ng/L- Negative Female for AMI  <22.0 ng/L- Negative Male for AMI  >=14 - Abnormal Female indicating possible myocardial injury.  >=22 - Abnormal Male " indicating possible myocardial injury.   Clinicians would have to utilize clinical acumen, EKG, Troponin, and serial changes to determine if it is an Acute Myocardial Infarction or myocardial injury due to an underlying chronic condition.         STAT Lactic Acid, Reflex [[id number]]  (Normal) Collected: 08/26/24 2348    Specimen: Blood Updated: 08/27/24 0027     Lactate 1.4 mmol/L     Potassium [[id number]]  (Normal) Collected: 08/26/24 2348    Specimen: Blood Updated: 08/27/24 0023     Potassium 5.0 mmol/L     Comprehensive Metabolic Panel [[id number]]  (Abnormal) Collected: 08/26/24 2127    Specimen: Blood Updated: 08/26/24 2203     Glucose 202 mg/dL      BUN 51 mg/dL      Creatinine 3.78 mg/dL      Sodium 135 mmol/L      Potassium 5.9 mmol/L      Comment: Specimen hemolyzed.  Result may be falsely elevated.        Chloride 100 mmol/L      CO2 23.0 mmol/L      Calcium 9.7 mg/dL      Total Protein 6.3 g/dL      Albumin 3.2 g/dL      ALT (SGPT) 30 U/L      Comment: Specimen hemolyzed.  Result may  be falsely elevated.        AST (SGOT) 37 U/L      Comment: Specimen hemolyzed.  Result may be falsely elevated.        Alkaline Phosphatase 101 U/L      Total Bilirubin 0.7 mg/dL      Globulin 3.1 gm/dL      A/G Ratio 1.0 g/dL      BUN/Creatinine Ratio 13.5     Anion Gap 12.0 mmol/L      eGFR 15.2 mL/min/1.73     Narrative:      GFR Normal >60  Chronic Kidney Disease <60  Kidney Failure <15    The GFR formula is only valid for adults with stable renal function between ages 18 and 70.    Magnesium [[id number]]  (Normal) Collected: 08/26/24 2127    Specimen: Blood Updated: 08/26/24 2203     Magnesium 1.6 mg/dL     Lactic Acid, Plasma [[id number]]  (Abnormal) Collected: 08/26/24 2129    Specimen: Blood Updated: 08/26/24 2202     Lactate 2.4 mmol/L     High Sensitivity Troponin T [[id number]]  (Abnormal) Collected: 08/26/24 2127    Specimen: Blood Updated: 08/26/24 2202     HS Troponin T 111 ng/L     Narrative:      High Sensitive  Troponin T Reference Range:  <14.0 ng/L- Negative Female for AMI  <22.0 ng/L- Negative Male for AMI  >=14 - Abnormal Female indicating possible myocardial injury.  >=22 - Abnormal Male indicating possible myocardial injury.   Clinicians would have to utilize clinical acumen, EKG, Troponin, and serial changes to determine if it is an Acute Myocardial Infarction or myocardial injury due to an underlying chronic condition.         [place] Draw [[id number]] Collected: 08/26/24 2125    Specimen: Blood Updated: 08/26/24 2200    Narrative:      The following orders were created for panel order [place] Draw.  Procedure                               Abnormality         Status                     ---------                               -----------         ------                     [place] Blood Culture Caio...[[id number]]                      Final result               [place] Top[[id number]]                                         Final result                 Please view results for these tests on the individual orders.    [employer] Blood Culture Bottle Set [[id number]] Collected: 08/26/24 2125    Specimen: Blood from Arm, Left Updated: 08/26/24 2200     Extra Tube Hold for add-ons.     Comment: Auto resulted.       [place] Draw [[id number]] Collected: 08/26/24 2130    Specimen: Blood from Arm, Right Updated: 08/26/24 2200    Narrative:      The following orders were created for panel order [place] Draw.  Procedure                               Abnormality         Status                     ---------                               -----------         ------                     [place] Blood Culture Caio...[[id number]]                      Final result                 Please view results for these tests on the individual orders.    [place] Blood Culture Bottle Set [[id number]] Collected: 08/26/24 2130    Specimen: Blood from Arm, Right Updated: 08/26/24 2200     Extra Tube Hold for add-ons.     Comment: Auto resulted.       Lipase  [[id number]]  (Abnormal) Collected: 08/26/24 2127    Specimen: Blood Updated: 08/26/24 2157     Lipase 10 U/L     Blood Culture - Blood, Arm, Left [[id number]] Collected: 08/26/24 2125    Specimen: Blood from Arm, Left Updated: 08/26/24 2152    Blood Culture - Blood, Arm, Right [[id number]] Collected: 08/26/24 2130    Specimen: Blood from Arm, Right Updated: 08/26/24 2152    CBC & Differential [[id number]]  (Abnormal) Collected: 08/26/24 2127    Specimen: Blood Updated: 08/26/24 2146    Narrative:      The following orders were created for panel order CBC & Differential.  Procedure                               Abnormality         Status                     ---------                               -----------         ------                     CBC Auto Differential[[id number]]        Abnormal            Final result                 Please view results for these tests on the individual orders.    CBC Auto Differential [[id number]]  (Abnormal) Collected: 08/26/24 2127    Specimen: Blood Updated: 08/26/24 2146     WBC 10.22 10*3/mm3      RBC 3.62 10*6/mm3      Hemoglobin 11.1 g/dL      Hematocrit 35.2 %      MCV 97.2 fL      MCH 30.7 pg      MCHC 31.5 g/dL      RDW 16.5 %      RDW-SD 59.0 fl      MPV 12.0 fL      Platelets 140 10*3/mm3      Neutrophil % 79.0 %      Lymphocyte % 11.4 %      Monocyte % 8.1 %      Eosinophil % 0.8 %      Basophil % 0.2 %      Immature Grans % 0.5 %      Neutrophils, Absolute 8.07 10*3/mm3      Lymphocytes, Absolute 1.17 10*3/mm3      Monocytes, Absolute 0.83 10*3/mm3      Eosinophils, Absolute 0.08 10*3/mm3      Basophils, Absolute 0.02 10*3/mm3      Immature Grans, Absolute 0.05 10*3/mm3      nRBC 0.0 /100 WBC     [place] Draw [[id number]] Collected: 08/26/24 2127    Specimen: Blood Updated: 08/26/24 2146    Narrative:      The following orders were created for panel order [place] Draw.  Procedure                               Abnormality         Status                     ---------                                -----------         ------                     Green Top (Gel)[[id number]]                                  Final result               Lavender Top[[id number]]                                     Final result               Red Top[[id number]]                                          Final result               Light Blue Top[[id number]]                                   Final result                 Please view results for these tests on the individual orders.    Green Top (Gel) [[id number]] Collected: 08/26/24 2127    Specimen: Blood Updated: 08/26/24 2146     Extra Tube Hold for add-ons.     Comment: Auto resulted.       Lavender Top [[id number]] Collected: 08/26/24 2127    Specimen: Blood Updated: 08/26/24 2146     Extra Tube hold for add-on     Comment: Auto resulted       Red Top [[id number]] Collected: 08/26/24 2127    Specimen: Blood Updated: 08/26/24 2146     Extra Tube Hold for add-ons.     Comment: Auto resulted.       Light Blue Top [[id number]] Collected: 08/26/24 2127    Specimen: Blood Updated: 08/26/24 2146     Extra Tube Hold for add-ons.     Comment: Auto resulted       [place] Top [[id number]] Collected: 08/26/24 2129    Specimen: Blood Updated: 08/26/24 2146     Extra Tube Hold for add-ons.     Comment: Auto resulted.             Imaging Results (Last 24 Hours)       Procedure Component Value Units Date/Time    XR Chest 1 View [[id number]] Collected: 08/26/24 2203     Updated: 08/26/24 2209    Narrative:      EXAM/TECHNIQUE: XR CHEST 1 VW-     INDICATION: dyspnea     COMPARISON: 8/7/2024, 8/20/2024     FINDINGS:     Resuscitation pads overlie the chest. Cardiac silhouette is mildly  enlarged but stable. Right chest port with catheter tip overlying the  SVC.     Redemonstrated diffuse interstitial fibrotic lung disease.  Similar-appearing right sided pleural thickening. Redemonstrated patchy  airspace opacities in the right upper and lower lung. The right upper  lobe opacity appears slightly  increased. No large pleural effusion. No  visible pneumothorax.     No acute osseous finding.       Impression:         Redemonstrated patchy airspace opacities in the right upper and lower  lung, with appearance of slight worsening of the right upper lobe  opacity. No change in right-sided pleural thickening. Diffuse pulmonary  fibrosis is again noted.     This report was signed and finalized on 8/26/2024 10:06 PM by Dr. Claudio De Santiago MD.             I have personally reviewed and interpreted the radiology studies and ECG obtained at time of admission.     Assessment / Plan   Assessment:   Active Hospital Problems    Diagnosis     **Narrow complex tachycardia      Impression:  Arrhythmia  Right upper lobe infiltrate, pneumonia appears to be worsening on chest x-ray  Acute kidney injury  Elevated troponin    Treatment Plan  Admit to telemetry  Cardiology consult  Continue Rocephin started in the ED, change azithromycin to doxycycline.  Follow-up labs in the morning  Gentle IV hydration    The patient will be admitted to my service here at .  Primary team to take over the morning    Medical Decision Making  Number and Complexity of problems: 4, complex  Differential Diagnosis: SVT    Conditions and Status        Condition is unchanged.     [facility] Data  External documents reviewed: None  Cardiac tracing (EKG, telemetry) interpretation: None  Radiology interpretation: None  Labs reviewed: Reviewed  Any tests that were considered but not ordered: None     Decision rules/scores evaluated (example [id number], [place], etc): None     Discussed with: Patient     Care Planning  Shared decision making: Patient and ED physician  Code status and discussions: Full    Disposition  Social Determinants of Health that impact treatment or disposition: None  Estimated length of stay is 1 to 2 days.     I confirmed that the patient's advanced care plan is present, code status is documented, and a surrogate  decision maker is listed in the patient's medical record.     The patient's surrogate decision maker is family.     The patient was seen and examined by me on 8/27/2024 at 2                                                                                                                                                                                                                                                                                                                                                                                                                                                                                                                                                                                                                                                                                                                                                                                                                                                                                                                                                                                                                                                                                                                                                                                                                                                                                                                                                                                                                                                                                                                                                                                                                                                                                                                                                                                                                                                                                                                                                                                                                                                                                                                                                                                                                                                                                                                                                                                   .    Electronically signed by Jen Peñaloza DO, 08/27/24, 01:59 CDT.

## 2024-08-27 NOTE — TELEPHONE ENCOUNTER
"Reason for Disposition   Oxygen level (e.g., pulse oximetry) 90 percent or lower    Additional Information   Negative: SEVERE difficulty breathing (e.g., struggling for each breath, speaks in single words)   Negative: [1] Breathing stopped AND [2] hasn't returned   Negative: Choking on something   Negative: Bluish (or gray) lips or face now   Negative: Difficult to awaken or acting confused (e.g., disoriented, slurred speech)   Negative: Passed out (i.e., lost consciousness, collapsed and was not responding)   Negative: Wheezing started suddenly after medicine, an allergic food or bee sting   Negative: Stridor (harsh sound while breathing in)   Negative: Slow, shallow and weak breathing   Negative: Sounds like a life-threatening emergency to the triager   Negative: Chest pain   Negative: [1] Wheezing (high pitched whistling sound) AND [2] previous asthma attacks or use of asthma medicines   Negative: [1] Difficulty breathing AND [2] only present when coughing   Negative: [1] Difficulty breathing AND [2] only from stuffy or runny nose   Negative: [1] Difficulty breathing AND [2] within 14 days of COVID-19 Exposure   Negative: [1] MODERATE difficulty breathing (e.g., speaks in phrases, SOB even at rest, pulse 100-120) AND [2] NEW-onset or WORSE than normal    Answer Assessment - Initial Assessment Questions  1. RESPIRATORY STATUS: \"Describe your breathing?\" (e.g., wheezing, shortness of breath, unable to speak, severe coughing)       Shortness of breath  2. ONSET: \"When did this breathing problem begin?\"       Years, worse today  3. PATTERN \"Does the difficult breathing come and go, or has it been constant since it started?\"       Come and go; worse with exertion; takes at least 5 minutes for his breathing to \"settle down\" when he returns to his chair  4. SEVERITY: \"How bad is your breathing?\" (e.g., mild, moderate, severe)     - MILD: No SOB at rest, mild SOB with walking, speaks normally in sentences, can lie down, " "no retractions, pulse < 100.     - MODERATE: SOB at rest, SOB with minimal exertion and prefers to sit, cannot lie down flat, speaks in phrases, mild retractions, audible wheezing, pulse 100-120.     - SEVERE: Very SOB at rest, speaks in single words, struggling to breathe, sitting hunched forward, retractions, pulse > 120       Moderate to severe  5. RECURRENT SYMPTOM: \"Have you had difficulty breathing before?\" If Yes, ask: \"When was the last time?\" and \"What happened that time?\"       Yes; last week  6. CARDIAC HISTORY: \"Do you have any history of heart disease?\" (e.g., heart attack, angina, bypass surgery, angioplasty)       Heart rate issue  7. LUNG HISTORY: \"Do you have any history of lung disease?\"  (e.g., pulmonary embolus, asthma, emphysema)      emphysema  8. CAUSE: \"What do you think is causing the breathing problem?\"       emphysema  9. OTHER SYMPTOMS: \"Do you have any other symptoms? (e.g., dizziness, runny nose, cough, chest pain, fever)      Dizziness with exertion  10. O2 SATURATION MONITOR:  \"Do you use an oxygen saturation monitor (pulse oximeter) at home?\" If Yes, ask: \"What is your reading (oxygen level) today?\" \"What is your usual oxygen saturation reading?\" (e.g., 95%)        90% 2.5LNC - was 84% at beginning of conversation  11. PREGNANCY: \"Is there any chance you are pregnant?\" \"When was your last menstrual period?\"        na  12. TRAVEL: \"Have you traveled out of the country in the last month?\" (e.g., travel history, exposures)        na    Protocols used: Breathing Difficulty-ADULT-AH    "

## 2024-08-27 NOTE — OUTREACH NOTE
COPD/PN Week 3 Survey      Flowsheet Row Responses   [facility] facility patient discharged from? [place]   Does the patient have one of the following disease processes/diagnoses(primary or secondary)? COPD   Week 3 attempt successful? No   Unsuccessful attempts Attempt 1   Revoke Readmitted            Leela H - Registered Nurse

## 2024-08-27 NOTE — PROGRESS NOTES
Patient Name: Karoline Prater  Date of Admission: 8/26/2024  Today's Date: 08/27/24  Length of Stay: 0  Primary Care Physician: Irving Alexis MD    Subjective   Chief Complaint: Shortness of breath  Shortness of Breath       Karoline Prater 84-year-old white male with past medical history of GERD, blindness of his left eye, hypertension, BPH, pancreatic cancer with metastasis to the right lung currently being followed by Dr. Rico and Dr. Cool, PET scan 8/15/2024 revealed 1.3 cm spiculated nodule in the right upper lobe additional concerns for pleural-based mass and suspicion for metastatic lymphadenopathy with posterior lateral pleural thickening in the right lung due to his interstitial disease. HFpEF, last EF 61 to 65% on previous hospitalization.  CKD stage IIIb and fibrotic lung disease.  Patient presented to [facility] [place] emergency department on 8/26/2024 with complaints of extreme shortness of breath.  Patient states he was having difficulty ambulating back and forth to the bathroom with new onset of significant shortness of breath and he felt like his heart was racing.  Upon presentation patient was narrow complex sinus tachycardia, he was given a dose of etomidate and 1 sick shock at 100 J and converted to normal sinus rhythm.  Chest x-ray with mildly increasing markings in right upper lobe, treated for possible pneumonia with Rocephin and Zithromax.  The workup revealed an at bedtime troponin of 117 with a delta of 6, NA of 135, creatinine of 3.78, lipase of 10, procalcitonin of 0.3, WBC of 10.22 hemoglobin 11 with hematocrit of 35.2.  Patient was additionally given a 1 L normal saline bolus for undetermined reason and admitted for overnight evaluation and treatment    Today:   Patient is resting comfortably in bed on his home 2 L at rest.  No family at bedside.  Patient is alert and oriented and able to participate in assessment and history.  He stated that his shortness of breath is completely  resolved and he is back to his baseline after cardioversion, he said it was like a miracle after he was back in rhythm.  Patient is well-known to me and he appears euvolemic and at his baseline respiratory status.  Patient states he has had no fevers, purulent drainage, or cough.  Patient stated that this shortness of breath came on all of a sudden and was immediately gone after placed back in normal sinus rhythm.  Patient was evaluated by PATRICA Moody, cardiac electrophysiology with recommendations for possible AVN ablation with pacemaker implantation due to patient's history of atrial fibrillation and intermittent medication intolerance.  She discontinued patient's Multaq started by Dr. Bowen on previous hospitalization due to CAROLINA on CKD.  Patient's long medical history will not allow him to tolerate amiodarone, flecainide, or Tikosyn.  Patient will remain n.p.o. for potential procedure today or in the morning.m patient will receive overnight very gentle hydration due to his ability to build up fluid quickly to assist with CKD and await cardiac electrophysiology's plan of care.  Patient additionally was treated for possible pneumonia in right upper lobe however this is a chronic mass that is being followed and treated by Dr. Crocker, Dr. Rico, and Dr. Cool.  Antibiotics discontinued due to normal WBC, afebrile, and lack of respiratory symptoms.  All patient's questions were answered to the best my ability and patient is in agreement for overnight observation with hopeful resolution to CKD and he is open to any intervention Dr. Bowen feels this is necessary.      Documented weights    08/26/24 2111 08/27/24 0326 08/27/24 0327   Weight: 74.8 kg (164 lb 14.5 oz) 74.8 kg (164 lb 14.5 oz) 74.8 kg (164 lb 14.5 oz)        Review of Systems   HENT: Negative.     Eyes: Negative.    Respiratory: Negative.     Cardiovascular: Negative.    Gastrointestinal: Negative.    Neurological:  Positive for weakness.      All  pertinent negatives and positives are as above. All other systems have been reviewed and are negative unless otherwise stated.     Objective    Temp:  [98 °F (36.7 °C)-98.6 °F (37 °C)] 98.6 °F (37 °C)  Heart Rate:  [] 89  Resp:  [19-38] 26  BP: ()/(54-95) 107/62  Physical Exam  Vitals and nursing note reviewed.   Constitutional:       Comments: Patient is resting comfortably in bed on his home 2 L of oxygen.  No family at bedside   HENT:      Nose: Nose normal. No congestion or rhinorrhea.      Mouth/Throat:      Mouth: Mucous membranes are moist.      Pharynx: Oropharynx is clear.   Eyes:      Pupils: Pupils are equal, round, and reactive to light.   Cardiovascular:      Rate and Rhythm: Normal rate and regular rhythm.      Comments: Since cardioversion patient has remained normal sinus rhythm in the 70s  Pulmonary:      Effort: No tachypnea, accessory muscle usage or respiratory distress.      Breath sounds: Examination of the right-lower field reveals rhonchi. Examination of the left-lower field reveals rhonchi.      Comments: Chronic lower lobe rhonchi  Abdominal:      General: Abdomen is flat.      Palpations: Abdomen is soft.   Genitourinary:     Comments: Voiding per urinal, 300 cc of clear yellow urine  Musculoskeletal:      Right lower leg: No edema.      Left lower leg: No edema.   Skin:     General: Skin is warm.      Capillary Refill: Capillary refill takes less than 2 seconds.      Coloration: Skin is pale.      Comments: Mild chronic erythema to BLE, significantly improved since previous discharge   Neurological:      General: No focal deficit present.      Mental Status: He is oriented to person, place, and time.   Psychiatric:         Mood and Affect: Mood normal.         Behavior: Behavior normal.         Thought Content: Thought content normal.         Judgment: Judgment normal.         Results Review:  I have reviewed the labs, radiology results, and diagnostic studies.    Laboratory  Data:   Results from last 7 days   Lab Units 08/27/24  0320 08/26/24 2127   WBC 10*3/mm3 7.62 10.22   HEMOGLOBIN g/dL 10.3* 11.1*   HEMATOCRIT % 33.7* 35.2*   PLATELETS 10*3/mm3 133* 140        Results from last 7 days   Lab Units 08/27/24  0320 08/26/24  2348 08/26/24 2127   SODIUM mmol/L 136  --  135*   POTASSIUM mmol/L 4.8 5.0 5.9*   CHLORIDE mmol/L 107  --  100   CO2 mmol/L 20.0*  --  23.0   BUN mg/dL 44*  --  51*   CREATININE mg/dL 3.47*  --  3.78*   CALCIUM mg/dL 8.9  --  9.7   BILIRUBIN mg/dL 0.5  --  0.7   ALK PHOS U/L 85  --  101   ALT (SGPT) U/L 26  --  30   AST (SGOT) U/L 25  --  37   GLUCOSE mg/dL 103*  --  202*       Microbiology Results (last 10 days)       ** No results found for the last 240 hours. **             Radiology Data:   Imaging Results (Last 24 Hours)       Procedure Component Value Units Date/Time    XR Chest 1 View [[id number]] Collected: 08/26/24 2203     Updated: 08/26/24 2209    Narrative:      EXAM/TECHNIQUE: XR CHEST 1 VW-     INDICATION: dyspnea     COMPARISON: 8/7/2024, 8/20/2024     FINDINGS:     Resuscitation pads overlie the chest. Cardiac silhouette is mildly  enlarged but stable. Right chest port with catheter tip overlying the  SVC.     Redemonstrated diffuse interstitial fibrotic lung disease.  Similar-appearing right sided pleural thickening. Redemonstrated patchy  airspace opacities in the right upper and lower lung. The right upper  lobe opacity appears slightly increased. No large pleural effusion. No  visible pneumothorax.     No acute osseous finding.       Impression:         Redemonstrated patchy airspace opacities in the right upper and lower  lung, with appearance of slight worsening of the right upper lobe  opacity. No change in right-sided pleural thickening. Diffuse pulmonary  fibrosis is again noted.     This report was signed and finalized on 8/26/2024 10:06 PM by Dr. Claudio De Santiago MD.               I have reviewed the patient's current medications.      [Held by provider] allopurinol, 100 mg, Oral, Daily Before Lunch  aspirin, 81 mg, Oral, Daily  atorvastatin, 20 mg, Oral, Nightly  budesonide-formoterol, 2 puff, Inhalation, BID - RT  cetirizine, 10 mg, Oral, Daily  enoxaparin, 30 mg, Subcutaneous, Daily  isosorbide mononitrate, 30 mg, Oral, Daily Before Lunch  [Held by provider] losartan, 50 mg, Oral, Daily  metoprolol succinate XL, 50 mg, Oral, Daily  montelukast, 10 mg, Oral, Nightly  multivitamin with minerals, 1 tablet, Oral, Daily  pantoprazole, 40 mg, Oral, Daily  sodium bicarbonate, 650 mg, Oral, TID  sodium chloride, 10 mL, Intravenous, Q12H  tamsulosin, 0.4 mg, Oral, Nightly         Intake/Output Summary (Last 24 hours) at 8/27/2024 1339  Last data filed at 8/27/2024 1150  Gross per 24 hour   Intake 3590 ml   Output 1250 ml   Net 2340 ml        Assessment/Plan   Assessment  Active Hospital Problems    Diagnosis     **Acute kidney injury superimposed on CKD stage 3b     Narrow complex tachycardia     Chronic heart failure with preserved ejection fraction (HFpEF)     Malignant neoplasm of upper lobe of right lung        Treatment Plan  1.  Acute kidney injury superimposed on CKD stage IIIb-patient's baseline creatinine is approximately 2, was discharged home on 8/7/2024 with a creatinine of 1.95.  2 patient's increasing blood pressure and improving creatinine we continued Bumex at discharge rather than his previous Lasix, he was restarted on his home losartan and allopurinol, as well as Dr. Bowen initiating Multaq.  This obviously worsened his kidney function.  Allopurinol, Multaq, and Cozaar were placed back on hold as well as Bumex.  Initiate sodium bicarbonate 650 mg 3 times daily and gentle hydration overnight and evaluation of kidney function in the morning with BMP.    2.  Narrow complex tachycardia-patient presented in unstable narrow complex sinus tachycardia, patient was given etomidate and was cardioverted with 100 J with successful conversion  to normal sinus rhythm.  Patient remains currently in normal sinus rhythm in the 70s.  Awaiting plan of care per Dr. Bowen for possible AVN ablation and pacemaker.  Continue cardiac telemetry, Lovenox, and metoprolol.    3.  HFpEF-patient appears to be euvolemic from previous admission, immediate cessation of shortness of breath post conversion to normal sinus rhythm.  Due to patient's CAROLINA on CKD, Cozaar will be held and continue on optimize care with  Imdur, and metoprolol.    4.  Malignant neoplasm of upper lobe of right lung-patient is currently followed by Dr. Delong, pulmonology, Dr. Rioc, oncology, and Dr. Cool, radiation oncology.  Most recent PET scan revealed 8/15/2024 revealed 1.3 cm spiculated nodule in the right upper lobe additional concerns for pleural-based mass and suspicion for metastatic lymphadenopathy with posterior lateral pleural thickening in the right lung due to his interstitial disease. This was siilar to finding on CR, therefore due to afebrile, lack of respiratory symptom and normal WBC, ABX discontinued and will continue to follow.  Afternoon blood culture just showed abnormal stain will continue to hold off on antibiotics pneumonia panel ordered will reevaluate blood culture in the morning    Medical Decision Making  Acute kidney injury, acute on chronic, high complexity, improving  Narrow complex tachycardia, acute, moderate complexity, improving  HFpEF, chronic, moderate complexity, stable  Malignant neoplasm of upper lobe of the right lung, chronic, high complexity, unchanged    Number and Complexity of problems: 4  Differential Diagnosis: None    Conditions and Status        Condition is improving.     [facility] Data  External documents reviewed: Epic review of previous hospitalizations, oncology, pulmonology radiation oncology and PCP office notes  Cardiac tracing (EKG, telemetry) interpretation: 7/4/2024 echocardiogram per cardiology reviewed, 8/26/2024 EKG per cardiology  reviewed  8/27/2024 EKG per cardiology reviewed  Radiology interpretation: 8/15/2024 PET scan, 8/27/2024 chest x-ray per radiology reviewed  Labs reviewed: 8/26/2024 CBC, CMP, lipase, high-sensitivity troponin, magnesium, lactic acid, BC x 2 reviewed  8/26/2024 CBC, CMP, BNP, procalcitonin reviewed, repeat labs in a.m.  Any tests that were considered but not ordered: None     Decision rules/scores evaluated (example [id number], Wells, etc): None     Discussed with: Dr. Macias, Dr. Bowen, cardiac electrophysiology and patient     Care Planning  Shared decision making: Dr. Macias, Dr. Bowen, cardiac electrophysiology and patient  Code status and discussions: DNR/DNI per patient  Surrogate Decision Maker is son Beau or his brother Keshav    Disposition  Social Determinants of Health that impact treatment or disposition: Patient should return with resumption of home health  I expect the patient to be discharged to home with home health in 1-2 days.     Electronically signed by MARITZA Johnson, 08/27/24, 13:39 CDT.

## 2024-08-27 NOTE — ED PROVIDER NOTES
Subjective   History of Present Illness  Pt presents to the  with report of dyspnea that is worse with exertion - onset yesterday.  Pt denies CP.  No n/v/f/c. Has chronic LE edema.  Pt has hx of lung CA treated with chemo/rad.  Pt reports his heart has been racing today.  No sick contacts.          Review of Systems   Constitutional:  Negative for chills and fever.   HENT:  Negative for congestion.    Respiratory:  Positive for shortness of breath.    Cardiovascular:  Positive for palpitations and leg swelling. Negative for chest pain.   Gastrointestinal:  Negative for abdominal pain, blood in stool, diarrhea, nausea, rectal pain and vomiting.   Genitourinary:  Negative for dysuria.   Skin:  Negative for rash.   Neurological:  Positive for light-headedness.   All other systems reviewed and are negative.      Past Medical History:   Diagnosis Date    Arthritis     Atrial fibrillation with rapid ventricular response 05/31/2019    Blindness of left eye     BPH with obstruction/lower urinary tract symptoms     Cancer     stomach & lung    CHF (congestive heart failure)     CKD (chronic kidney disease) stage 3, GFR 30-59 ml/min     COPD with acute exacerbation 8/3/2024    Coronary artery disease     Elevated cholesterol     Essential hypertension 10/02/2017    Fibrotic lung diseases     GERD (gastroesophageal reflux disease)     Hearing loss     History of transfusion     Hydronephrosis of left kidney     Hyperlipidemia     Hypertension     pt was taken off of all of his medications for BP (atenolol, lisinopril, lasix) because his BP kept bottoming out so his primary dr told him to discontinue them 1-2 months ago (Jan/Feb 2019). pt states he takes no medications currently.    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 Patel is just going to keep an eye on it and  not do surgery right now.    Mediastinal adenopathy 10/24/2018    [facility]    Monoclonal gammopathy of unknown significance (MGUS) 2018    Pancreatic mass     pt states he had this in  but it went away on its own. Now recent CT shows it has come back so he is going to have an ultrasound on 3/13/19.    Shortness of breath        Allergies   Allergen Reactions    Penicillins Hives       Past Surgical History:   Procedure Laterality Date    ABDOMINAL SURGERY      BRONCHOSCOPY N/A 10/24/2018    Procedure: BRONCHOSCOPY WITH BIOPSY, EBUS;  Surgeon: Gareth Becerra MD;  Location: [facility] OR;  Service: Pulmonary    CHOLECYSTECTOMY      COLONOSCOPY N/A 1/3/2019    Procedure: COLONOSCOPY WITH ANESTHESIA;  Surgeon: Randy Somers DO;  Location: [facility] ENDOSCOPY;  Service: Gastroenterology    CYSTOSCOPY, RETROGRADE PYELOGRAM AND STENT INSERTION Left 3/8/2019    Procedure: CYSTOSCOPY RETROGRADE BILATERAL PYELOGRAM;  Surgeon: Jos Sylvester MD;  Location: [facility] OR;  Service: Urology    ENDOSCOPY N/A 2018    Procedure: ESOPHAGOGASTRODUODENOSCOPY WITH ANESTHESIA;  Surgeon: Randy Somers DO;  Location: [facility] ENDOSCOPY;  Service: Gastroenterology    ENDOSCOPY N/A 2019    Procedure: ESOPHAGOGASTRODUODENOSCOPY WITH ANESTHESIA;  Surgeon: Lilliam Jj MD;  Location: [facility] OR;  Service: Gastroenterology    ENDOSCOPY N/A 2019    Procedure: ESOPHAGOGASTRODUODENOSCOPY WITH ANESTHESIA;  Surgeon: Pilo Bansal MD;  Location: [facility] ENDOSCOPY;  Service: Gastroenterology    EYE SURGERY Left 1964    FOOT SURGERY Right     joint    FRACTURE SURGERY         Family History   Problem Relation Age of Onset    Hypertension Mother     Leukemia Father        Social History     Socioeconomic History    Marital status: Single   Tobacco Use    Smoking status: Former     Current packs/day: 0.00     Types: Cigarettes     Start date: 10/29/1954     Quit date: 10/29/2003     Years since quittin.8     Smokeless tobacco: Former     Types: Chew     Quit date: 1970   Vaping Use    Vaping status: Never Used   Substance and Sexual Activity    Alcohol use: No    Drug use: No    Sexual activity: Defer           Objective   Physical Exam  Vitals and nursing note reviewed.   Constitutional:       General: He is not in acute distress.  HENT:      Head: Normocephalic.      Mouth/Throat:      Mouth: Mucous membranes are moist.   Eyes:      Comments: Chronic changes to L eye, R unremarkable   Cardiovascular:      Rate and Rhythm: Regular rhythm. Tachycardia present.   Pulmonary:      Effort: Pulmonary effort is normal.      Breath sounds: Examination of the right-lower field reveals decreased breath sounds. Examination of the left-lower field reveals decreased breath sounds. Decreased breath sounds present. No wheezing or rhonchi.   Chest:      Chest wall: No tenderness.   Abdominal:      General: Bowel sounds are normal.      Palpations: Abdomen is soft.   Musculoskeletal:      Cervical back: Normal range of motion and neck supple.      Right lower leg: Edema present.      Left lower leg: Edema present.   Skin:     General: Skin is warm and dry.      Capillary Refill: Capillary refill takes less than 2 seconds.   Neurological:      General: No focal deficit present.      Mental Status: He is alert and oriented to person, place, and time.         Electrical Cardioversion    Date/Time: 8/26/2024 9:50 PM    Performed by: Shay Arevalo DO  Authorized by: Shay Arevalo DO    Consent:     Consent obtained:  Emergent situation and verbal    Consent given by:  Patient    Risks, benefits, and alternatives were discussed: yes    Universal protocol:     Procedure explained and questions answered to patient or proxy's satisfaction: yes      Required blood products, implants, devices, and special equipment available: yes      Patient identity confirmed:  Verbally with patient  Pre-procedure details:     Cardioversion  basis:  Emergent    Rhythm:  Atrial flutter    Electrode placement:  Anterior-posterior  Attempt one:     Cardioversion mode:  Synchronous    Waveform:  Biphasic    Shock (Joules):  100    Shock outcome:  Conversion to normal sinus rhythm  Post-procedure details:     Procedure completion:  Tolerated well, no immediate complications  Procedural Sedation    Date/Time: 8/26/2024 9:51 PM    Performed by: Shay Arevalo DO  Authorized by: Shay Arevalo DO    Consent:     Consent obtained:  Emergent situation and verbal    Consent given by:  Patient    Risks, benefits, and alternatives were discussed: yes      Risks discussed:  Prolonged hypoxia resulting in organ damage, respiratory compromise necessitating ventilatory assistance and intubation, vomiting, nausea and inadequate sedation  Universal protocol:     Procedure explained and questions answered to patient or proxy's satisfaction: yes      Required blood products, implants, devices, and special equipment available: yes      Patient identity confirmed:  Verbally with patient  Indications:     Procedure performed:  Cardioversion    Procedure necessitating sedation performed by:  Physician performing sedation  Pre-sedation assessment:     NPO status caution: urgency dictates proceeding with non-ideal NPO status      ASA classification: class 4 - patient with severe systemic disease that is a constant threat to life      Mallampati score:  III - soft palate, base of uvula visible    Neck mobility: normal      Pre-sedation assessments completed and reviewed: airway patency, anesthesia/sedation history, cardiovascular function, nausea/vomiting and temperature      History of difficult intubation: no    Immediate pre-procedure details:     Reassessment: Patient reassessed immediately prior to procedure      Reviewed: vital signs      Verified: bag valve mask available, emergency equipment available, intubation equipment available, IV patency confirmed,  oxygen available and suction available    Procedure details (see MAR for exact dosages):     Preoxygenation:  Nasal cannula    Sedation:  Etomidate    Analgesia:  None    Intra-procedure monitoring:  Blood pressure monitoring, cardiac monitor, frequent LOC assessments, frequent vital sign checks, continuous capnometry and continuous pulse oximetry    Intra-procedure events: none    Post-procedure details:     Attendance: Constant attendance by certified staff until patient recovered      Recovery: Patient returned to pre-procedure baseline      Post-sedation assessments completed and reviewed: airway patency, cardiovascular function, hydration status, mental status, nausea/vomiting, pain level, respiratory function and temperature      Specimens recovered:  None    Procedure completion:  Tolerated well, no immediate complications             ED Course      Labs Reviewed   COMPREHENSIVE METABOLIC PANEL - Abnormal; Notable for the following components:       Result Value    Glucose 202 (*)     BUN 51 (*)     Creatinine 3.78 (*)     Sodium 135 (*)     Potassium 5.9 (*)     Albumin 3.2 (*)     eGFR 15.2 (*)     All other components within normal limits    Narrative:     GFR Normal >60  Chronic Kidney Disease <60  Kidney Failure <15    The GFR formula is only valid for adults with stable renal function between ages 18 and 70.   LIPASE - Abnormal; Notable for the following components:    Lipase 10 (*)     All other components within normal limits   TROPONIN - Abnormal; Notable for the following components:    HS Troponin T 111 (*)     All other components within normal limits    Narrative:     High Sensitive Troponin T Reference Range:  <14.0 ng/L- Negative Female for AMI  <22.0 ng/L- Negative Male for AMI  >=14 - Abnormal Female indicating possible myocardial injury.  >=22 - Abnormal Male indicating possible myocardial injury.   Clinicians would have to utilize clinical acumen, EKG, Troponin, and serial changes to  determine if it is an Acute Myocardial Infarction or myocardial injury due to an underlying chronic condition.        LACTIC ACID, PLASMA - Abnormal; Notable for the following components:    Lactate 2.4 (*)     All other components within normal limits   CBC WITH AUTO DIFFERENTIAL - Abnormal; Notable for the following components:    RBC 3.62 (*)     Hemoglobin 11.1 (*)     Hematocrit 35.2 (*)     MCV 97.2 (*)     RDW 16.5 (*)     RDW-SD 59.0 (*)     Neutrophil % 79.0 (*)     Lymphocyte % 11.4 (*)     Neutrophils, Absolute 8.07 (*)     All other components within normal limits   HIGH SENSITIVITIY TROPONIN T 2HR - Abnormal; Notable for the following components:    HS Troponin T 117 (*)     Troponin T Delta 6 (*)     All other components within normal limits    Narrative:     High Sensitive Troponin T Reference Range:  <14.0 ng/L- Negative Female for AMI  <22.0 ng/L- Negative Male for AMI  >=14 - Abnormal Female indicating possible myocardial injury.  >=22 - Abnormal Male indicating possible myocardial injury.   Clinicians would have to utilize clinical acumen, EKG, Troponin, and serial changes to determine if it is an Acute Myocardial Infarction or myocardial injury due to an underlying chronic condition.        MAGNESIUM - Normal   LACTIC ACID, REFLEX - Normal   POTASSIUM - Normal   BLOOD CULTURE   BLOOD CULTURE   RAINBOW DRAW    Narrative:     The following orders were created for panel order [place] Draw.  Procedure                               Abnormality         Status                     ---------                               -----------         ------                     Green Top (Gel)[[id number]]                                  Final result               Lavender Top[[id number]]                                     Final result               Red Top[[id number]]                                          Final result               Light Blue Top[[id number]]                                   Final result                  Please view results for these tests on the individual orders.   RAINBOW DRAW    Narrative:     The following orders were created for panel order [place] Draw.  Procedure                               Abnormality         Status                     ---------                               -----------         ------                     [place] Blood Culture Caio...[[id number]]                      Final result               [place] Top[[id number]]                                         Final result                 Please view results for these tests on the individual orders.   RAINBOW DRAW    Narrative:     The following orders were created for panel order [place] Draw.  Procedure                               Abnormality         Status                     ---------                               -----------         ------                     [place] Blood Culture Caio...[[id number]]                      Final result                 Please view results for these tests on the individual orders.   URINALYSIS W/ MICROSCOPIC IF INDICATED (NO CULTURE)   GREEN TOP   LAVENDER TOP   RED TOP   LIGHT BLUE TOP   RAINBOW BLOOD CULTURE BOTTLES - 1 SET   GRAY TOP   RAINBOW BLOOD CULTURE BOTTLES - 1 SET   CBC AND DIFFERENTIAL    Narrative:     The following orders were created for panel order CBC & Differential.  Procedure                               Abnormality         Status                     ---------                               -----------         ------                     CBC Auto Differential[[id number]]        Abnormal            Final result                 Please view results for these tests on the individual orders.     XR Chest 1 View   Final Result       Redemonstrated patchy airspace opacities in the right upper and lower   lung, with appearance of slight worsening of the right upper lobe   opacity. No change in right-sided pleural thickening. Diffuse pulmonary   fibrosis is again noted.       This report was signed and  finalized on 8/26/2024 10:06 PM by Dr. Claudio De Santiago MD.                                                     Medical Decision Making  Pt stable in EC att.  Resting comfortably and in NAD.  On presentation pt had unstable tachyarrhythmia given his hypotension/dyspnea.  Given this - d/w patient emergent cardioversion.  He was given a dose of etomidate prior to this.  Pt received one synch shock at 100J and converted to NSR.  He has maintained this since that time.  BP has been somewhat soft but pretty stable and pt has had no mentation changes, etc.  CXR with mildly increased markings in RUL - treated for pneumonia with rocephin/zithromax.  + CAROLINA.  Pt has received IVFs.  This is not clearly sepsis - suspect that this is mostly due to his tachy and poor perfusion.  Have d/w Dr. Peñaloza - will admit for further mgmt.    Amount and/or Complexity of Data Reviewed  Labs: ordered.  Radiology: ordered.  ECG/medicine tests: ordered and independent interpretation performed.     Details: Narrow complex tachycardia - 175, LAD.  Rate precludes further eval    Repeat - NSR, LAD, Nml int/STT    Risk  Prescription drug management.    Critical Care  Total time providing critical care: 40 minutes        Final diagnoses:   CAROLINA (acute kidney injury)   Narrow complex tachycardia   Elevated troponin   Pneumonia of right upper lobe due to infectious organism       ED Disposition  ED Disposition       ED Disposition   Decision to Admit    Condition   --    Comment   --               No follow-up provider specified.       Medication List      No changes were made to your prescriptions during this visit.            Shay Arevalo, DO  08/26/24 2153       Shay Arevalo, DO  08/27/24 0015       Shay Arevalo, DO  08/27/24 0037

## 2024-08-28 ENCOUNTER — APPOINTMENT (OUTPATIENT)
Dept: GENERAL RADIOLOGY | Facility: HOSPITAL | Age: 84
DRG: 228 | End: 2024-08-28
Payer: MEDICARE

## 2024-08-28 LAB
ANION GAP SERPL CALCULATED.3IONS-SCNC: 5 MMOL/L (ref 5–15)
ANION GAP SERPL CALCULATED.3IONS-SCNC: 9 MMOL/L (ref 5–15)
BUN SERPL-MCNC: 30 MG/DL (ref 8–23)
BUN SERPL-MCNC: 36 MG/DL (ref 8–23)
BUN/CREAT SERPL: 12.6 (ref 7–25)
BUN/CREAT SERPL: 13.5 (ref 7–25)
CALCIUM SPEC-SCNC: 9.3 MG/DL (ref 8.6–10.5)
CALCIUM SPEC-SCNC: 9.9 MG/DL (ref 8.6–10.5)
CHLORIDE SERPL-SCNC: 107 MMOL/L (ref 98–107)
CHLORIDE SERPL-SCNC: 111 MMOL/L (ref 98–107)
CO2 SERPL-SCNC: 20 MMOL/L (ref 22–29)
CO2 SERPL-SCNC: 23 MMOL/L (ref 22–29)
CREAT SERPL-MCNC: 2.39 MG/DL (ref 0.76–1.27)
CREAT SERPL-MCNC: 2.67 MG/DL (ref 0.76–1.27)
DEPRECATED RDW RBC AUTO: 59.7 FL (ref 37–54)
EGFRCR SERPLBLD CKD-EPI 2021: 23.1 ML/MIN/1.73
EGFRCR SERPLBLD CKD-EPI 2021: 26.4 ML/MIN/1.73
ERYTHROCYTE [DISTWIDTH] IN BLOOD BY AUTOMATED COUNT: 16.5 % (ref 12.3–15.4)
GLUCOSE SERPL-MCNC: 110 MG/DL (ref 65–99)
GLUCOSE SERPL-MCNC: 136 MG/DL (ref 65–99)
HCT VFR BLD AUTO: 31.9 % (ref 37.5–51)
HGB BLD-MCNC: 9.6 G/DL (ref 13–17.7)
MAGNESIUM SERPL-MCNC: 1.5 MG/DL (ref 1.6–2.4)
MCH RBC QN AUTO: 29.9 PG (ref 26.6–33)
MCHC RBC AUTO-ENTMCNC: 30.1 G/DL (ref 31.5–35.7)
MCV RBC AUTO: 99.4 FL (ref 79–97)
PLATELET # BLD AUTO: 131 10*3/MM3 (ref 140–450)
PMV BLD AUTO: 11.7 FL (ref 6–12)
POTASSIUM SERPL-SCNC: 4.5 MMOL/L (ref 3.5–5.2)
POTASSIUM SERPL-SCNC: 4.6 MMOL/L (ref 3.5–5.2)
QT INTERVAL: 280 MS
QT INTERVAL: 312 MS
QTC INTERVAL: 400 MS
QTC INTERVAL: 477 MS
RBC # BLD AUTO: 3.21 10*6/MM3 (ref 4.14–5.8)
SODIUM SERPL-SCNC: 136 MMOL/L (ref 136–145)
SODIUM SERPL-SCNC: 139 MMOL/L (ref 136–145)
WBC NRBC COR # BLD AUTO: 5.58 10*3/MM3 (ref 3.4–10.8)

## 2024-08-28 PROCEDURE — 94761 N-INVAS EAR/PLS OXIMETRY MLT: CPT

## 2024-08-28 PROCEDURE — 80048 BASIC METABOLIC PNL TOTAL CA: CPT

## 2024-08-28 PROCEDURE — 85027 COMPLETE CBC AUTOMATED: CPT

## 2024-08-28 PROCEDURE — 94799 UNLISTED PULMONARY SVC/PX: CPT

## 2024-08-28 PROCEDURE — 71046 X-RAY EXAM CHEST 2 VIEWS: CPT

## 2024-08-28 PROCEDURE — 94664 DEMO&/EVAL PT USE INHALER: CPT

## 2024-08-28 PROCEDURE — 83735 ASSAY OF MAGNESIUM: CPT | Performed by: STUDENT IN AN ORGANIZED HEALTH CARE EDUCATION/TRAINING PROGRAM

## 2024-08-28 PROCEDURE — 99232 SBSQ HOSP IP/OBS MODERATE 35: CPT | Performed by: STUDENT IN AN ORGANIZED HEALTH CARE EDUCATION/TRAINING PROGRAM

## 2024-08-28 PROCEDURE — 25810000003 SODIUM CHLORIDE 0.9 % SOLUTION

## 2024-08-28 PROCEDURE — 25010000002 ENOXAPARIN PER 10 MG: Performed by: INTERNAL MEDICINE

## 2024-08-28 RX ORDER — SODIUM CHLORIDE 9 MG/ML
50 INJECTION, SOLUTION INTRAVENOUS CONTINUOUS
Status: DISPENSED | OUTPATIENT
Start: 2024-08-28 | End: 2024-08-29

## 2024-08-28 RX ORDER — SODIUM CHLORIDE 9 MG/ML
50 INJECTION, SOLUTION INTRAVENOUS CONTINUOUS
Status: DISPENSED | OUTPATIENT
Start: 2024-08-28 | End: 2024-08-28

## 2024-08-28 RX ADMIN — SODIUM CHLORIDE 50 ML/HR: 9 INJECTION, SOLUTION INTRAVENOUS at 17:20

## 2024-08-28 RX ADMIN — SODIUM BICARBONATE 650 MG: 650 TABLET ORAL at 19:48

## 2024-08-28 RX ADMIN — BUDESONIDE AND FORMOTEROL FUMARATE DIHYDRATE 2 PUFF: 160; 4.5 AEROSOL RESPIRATORY (INHALATION) at 18:39

## 2024-08-28 RX ADMIN — ASPIRIN 81 MG: 81 TABLET, COATED ORAL at 09:13

## 2024-08-28 RX ADMIN — PANTOPRAZOLE SODIUM 40 MG: 40 TABLET, DELAYED RELEASE ORAL at 09:13

## 2024-08-28 RX ADMIN — ENOXAPARIN SODIUM 30 MG: 100 INJECTION SUBCUTANEOUS at 09:13

## 2024-08-28 RX ADMIN — Medication 10 ML: at 20:00

## 2024-08-28 RX ADMIN — ISOSORBIDE MONONITRATE 30 MG: 30 TABLET, EXTENDED RELEASE ORAL at 12:17

## 2024-08-28 RX ADMIN — MONTELUKAST SODIUM 10 MG: 10 TABLET ORAL at 19:49

## 2024-08-28 RX ADMIN — METOPROLOL SUCCINATE 50 MG: 50 TABLET, EXTENDED RELEASE ORAL at 09:13

## 2024-08-28 RX ADMIN — CETIRIZINE HYDROCHLORIDE 10 MG: 10 TABLET ORAL at 09:13

## 2024-08-28 RX ADMIN — SODIUM BICARBONATE 650 MG: 650 TABLET ORAL at 16:42

## 2024-08-28 RX ADMIN — BUDESONIDE AND FORMOTEROL FUMARATE DIHYDRATE 2 PUFF: 160; 4.5 AEROSOL RESPIRATORY (INHALATION) at 06:58

## 2024-08-28 RX ADMIN — BISACODYL 5 MG: 5 TABLET, COATED ORAL at 17:15

## 2024-08-28 RX ADMIN — TAMSULOSIN HYDROCHLORIDE 0.4 MG: 0.4 CAPSULE ORAL at 19:48

## 2024-08-28 RX ADMIN — Medication 1 TABLET: at 09:13

## 2024-08-28 RX ADMIN — SODIUM BICARBONATE 650 MG: 650 TABLET ORAL at 09:13

## 2024-08-28 RX ADMIN — ATORVASTATIN CALCIUM 20 MG: 10 TABLET, FILM COATED ORAL at 19:49

## 2024-08-28 NOTE — PLAN OF CARE
Goal Outcome Evaluation:           Progress: no change  Outcome Evaluation: VSS NSR 81-95 per tele.  Denies pain or nausea this shift.  Pt endorses sob with activity.  Currently on 3L NC, did have to be bumped to 4L while sleeping due to sats dropping to 87-88 range.  Has been NPO past midnight.  Will continue to monitor.

## 2024-08-28 NOTE — PLAN OF CARE
Goal Outcome Evaluation:  Plan of Care Reviewed With: patient, family        Progress: improving  Outcome Evaluation: Pt having no c/o pain. SOB with exertion. up to chair most of afternoon. pt to be NPO as of midnight for cardio. VSS S  with PVCs. continue current treatment. monitoring pt

## 2024-08-28 NOTE — PROGRESS NOTES
"EP Problems:  1.  Paroxysmal SVT  2.  Wide-complex tachycardia 2019  3.  Paroxysmal atrial fibrillation  4.  Atrial flutter, uncertain type     Cardiology Problems:  1.  Hypertension     Medical Problems:  1.  Metastatic lung cancer  2.  Pneumothorax  3.  COPD on home oxygen  4.  GERD  5.  CKD  6.  Recurrent bleeding duodenal ulcer    Patient ID:  Karoline Prater is a 84 y.o. male with problem list as above as above who EP is following for paroxysmal atrial fibrillation, atrial flutter.    Subjective:  Maintaining sinus rhythm.  More comfortable today.  Feels much better than when he came in.    Objective:  /61 (BP Location: Right arm, Patient Position: Lying)   Pulse 88   Temp 98.1 °F (36.7 °C) (Oral)   Resp 18   Ht 162.6 cm (64\")   Wt 74.8 kg (164 lb 14.5 oz)   SpO2 92%   BMI 28.31 kg/m²     Chronically ill-appearing, no acute distress, left eye enucleated  Clear to auscultation bilaterally  Regular rate and rhythm  Warm, well-perfused    Labs today:  Lab Results   Component Value Date    GLUCOSE 110 (H) 08/28/2024    CALCIUM 9.3 08/28/2024     08/28/2024    K 4.5 08/28/2024    CO2 20.0 (L) 08/28/2024    BUN 36 (H) 08/28/2024    CREATININE 2.67 (H) 08/28/2024    EGFR 23.1 (L) 08/28/2024     Lab Results   Component Value Date    WBC 5.58 08/28/2024    HGB 9.6 (L) 08/28/2024    HCT 31.9 (L) 08/28/2024     (L) 08/28/2024     Magnesium ordered, pending    Telemetry reviewed: Maintaining sinus rhythm    Assessment:  Atrial flutter, uncertain type  Paroxysmal atrial fibrillation  Paroxysmal SVT  Acute on chronic kidney disease    Unfortunately, no good way to get the pacemaker and AV node ablation of the day due to the Cath Lab conflict in scheduling.  Creatinine is improving today.  I do think that additional time is reasonable to reduce the risk of CAROLINA with a leadless pacemaker implant anyways.  Will try again tomorrow.    Plan:  -Continue metoprolol  -N.p.o. at midnight for pacemaker and " AV node ablation tomorrow  -Continue to monitor renal function  -No anticoagulation given history of GI bleeding    Part of this note may be an electronic transcription/translation of spoken language to printed text using the Dragon Dictation System.

## 2024-08-28 NOTE — PROGRESS NOTES
Patient Name: Karoline Prater  Date of Admission: 8/26/2024  Today's Date: 08/28/24  Length of Stay: 0  Primary Care Physician: Irving Alexis MD    Subjective   Chief Complaint: Shortness of breath  Shortness of Breath       Karoline Prater 84-year-old white male with past medical history of GERD, blindness of his left eye, hypertension, BPH, pancreatic cancer with metastasis to the right lung currently being followed by Dr. Rico and Dr. Cool, PET scan 8/15/2024 revealed 1.3 cm spiculated nodule in the right upper lobe additional concerns for pleural-based mass and suspicion for metastatic lymphadenopathy with posterior lateral pleural thickening in the right lung due to his interstitial disease. HFpEF, last EF 61 to 65% on previous hospitalization.  CKD stage IIIb and fibrotic lung disease.  Patient presented to [facility] emergency department on 8/26/2024 with complaints of extreme shortness of breath.  Patient states he was having difficulty ambulating back and forth to the bathroom with new onset of significant shortness of breath and he felt like his heart was racing.  Upon presentation patient was narrow complex sinus tachycardia, he was given a dose of etomidate and 1 sick shock at 100 J and converted to normal sinus rhythm.  Chest x-ray with mildly increasing markings in right upper lobe, treated for possible pneumonia with Rocephin and Zithromax.  The workup revealed an at bedtime troponin of 117 with a delta of 6, NA of 135, creatinine of 3.78, lipase of 10, procalcitonin of 0.3, WBC of 10.22 hemoglobin 11 with hematocrit of 35.2.  Patient was additionally given a 1 L normal saline bolus for undetermined reason and admitted for overnight evaluation and treatment    Today:   Patient is resting comfortably in bed on his home dose of 2 L.  His brother is at bedside.  Patient is alert and oriented and able to participate in assessment.  He stated that his shortness of breath continues to be at  his baseline however he was up all night anxious about his cardiac procedure with Dr. Bowen.  Also stated that he was unable to eat after breakfast yesterday and is concerned he will go home another day without any food.  Discussed the case with Dr. Bowen who is not going to be able to fit him into the cath schedule today however patient will be able to have his procedure done in the morning with probable overnight stay and discharge on Friday.  Continues to have no complaints of chest pain, palpitations, coughing, or pain.  Patient instructed that due to the extended hospital stay he will need to continue incentive spirometry, pulmonary hygiene, and up in the chair for all meals and ambulating with physical therapy.  Patient's creatinine has significantly improved overnight, creatinine 2.67 we will continue fluid hydration until approximately 1400 when repeat BMP will be drawn.  Fluids will be discontinued if patient's creatinine has improved to his baseline of 2 given his propensity for fluid overload.  Patient's questions were answered best my ability and patient is in agreement for continued stay to monitor CAROLINA and for AVM ablation and pacemaker implantation in the morning      Documented weights    08/26/24 2111 08/27/24 0326 08/27/24 0327   Weight: 74.8 kg (164 lb 14.5 oz) 74.8 kg (164 lb 14.5 oz) 74.8 kg (164 lb 14.5 oz)        Review of Systems   HENT: Negative.     Eyes: Negative.    Respiratory: Negative.     Cardiovascular: Negative.    Gastrointestinal: Negative.    Neurological:  Positive for weakness.      All pertinent negatives and positives are as above. All other systems have been reviewed and are negative unless otherwise stated.     Objective    Temp:  [98 °F (36.7 °C)-98.4 °F (36.9 °C)] 98.1 °F (36.7 °C)  Heart Rate:  [73-90] 88  Resp:  [18-22] 18  BP: (103-120)/(55-66) 113/61  Physical Exam  Vitals and nursing note reviewed.   Constitutional:       Comments: Patient is resting comfortably in bed  on his home 2 L of oxygen.  His brother is at bedside   HENT:      Nose: Nose normal. No congestion or rhinorrhea.      Mouth/Throat:      Mouth: Mucous membranes are moist.      Pharynx: Oropharynx is clear.   Eyes:      Pupils: Pupils are equal, round, and reactive to light.   Cardiovascular:      Rate and Rhythm: Normal rate and regular rhythm.      Comments: Since cardioversion patient has remained normal sinus rhythm, overnight 81-95  Pulmonary:      Effort: No tachypnea, accessory muscle usage or respiratory distress.      Breath sounds: Examination of the right-lower field reveals rhonchi. Examination of the left-lower field reveals rhonchi.      Comments: Chronic lower lobe rhonchi  Abdominal:      General: Abdomen is flat.      Palpations: Abdomen is soft.   Genitourinary:     Comments: Voiding per urinal  Musculoskeletal:      Right lower leg: No edema.      Left lower leg: No edema.   Skin:     General: Skin is warm.      Capillary Refill: Capillary refill takes less than 2 seconds.      Coloration: Skin is pale.      Comments: Mild chronic erythema to BLE, significantly improved since previous discharge   Neurological:      General: No focal deficit present.      Mental Status: He is oriented to person, place, and time.   Psychiatric:         Mood and Affect: Mood normal.         Behavior: Behavior normal.         Thought Content: Thought content normal.         Judgment: Judgment normal.         Results Review:  I have reviewed the labs, radiology results, and diagnostic studies.    Laboratory Data:   Results from last 7 days   Lab Units 08/28/24 0425 08/27/24 0320 08/26/24 2127   WBC 10*3/mm3 5.58 7.62 10.22   HEMOGLOBIN g/dL 9.6* 10.3* 11.1*   HEMATOCRIT % 31.9* 33.7* 35.2*   PLATELETS 10*3/mm3 131* 133* 140        Results from last 7 days   Lab Units 08/28/24  0425 08/27/24  0320 08/26/24 2348 08/26/24 2127   SODIUM mmol/L 136 136  --  135*   POTASSIUM mmol/L 4.5 4.8 5.0 5.9*   CHLORIDE mmol/L  111* 107  --  100   CO2 mmol/L 20.0* 20.0*  --  23.0   BUN mg/dL 36* 44*  --  51*   CREATININE mg/dL 2.67* 3.47*  --  3.78*   CALCIUM mg/dL 9.3 8.9  --  9.7   BILIRUBIN mg/dL  --  0.5  --  0.7   ALK PHOS U/L  --  85  --  101   ALT (SGPT) U/L  --  26  --  30   AST (SGOT) U/L  --  25  --  37   GLUCOSE mg/dL 110* 103*  --  202*       Microbiology Results (last 10 days)       Procedure Component Value - Date/Time    S. Pneumo Ag Urine or CSF - Urine, Urine, Clean Catch [[id number]]  (Normal) Collected: 08/27/24 2020    Lab Status: Final result Specimen: Urine, Clean Catch Updated: 08/27/24 2049     Strep Pneumo Ag Negative    Legionella Antigen, Urine - Urine, Urine, Clean Catch [[id number]]  (Normal) Collected: 08/27/24 2020    Lab Status: Final result Specimen: Urine, Clean Catch Updated: 08/27/24 2048     LEGIONELLA ANTIGEN, URINE Negative    MRSA Screen, PCR (Inpatient) - Swab, Nares [[id number]]  (Normal) Collected: 08/27/24 2008    Lab Status: Final result Specimen: Swab from Nares Updated: 08/27/24 2133     MRSA PCR No MRSA Detected    Narrative:      The negative predictive value of this diagnostic test is high and should only be used to consider de-escalating anti-MRSA therapy. A positive result may indicate colonization with MRSA and must be correlated clinically.    Blood Culture - Blood, Arm, Right [[id number]]  (Normal) Collected: 08/26/24 2130    Lab Status: Preliminary result Specimen: Blood from Arm, Right Updated: 08/27/24 2200     Blood Culture No growth at 24 hours    Blood Culture - Blood, Arm, Left [[id number]]  (Abnormal) Collected: 08/26/24 2125    Lab Status: Preliminary result Specimen: Blood from Arm, Left Updated: 08/28/24 0647     Blood Culture Growth present, too young to evaluate     Gram Stain Aerobic Bottle Gram positive cocci    Blood Culture ID, PCR - Blood, Arm, Left [[id number]]  (Abnormal) Collected: 08/26/24 2125    Lab Status: Final result Specimen: Blood from Arm, Left Updated: 08/27/24  1704     BCID, PCR Staph spp, not aureus or lugdunensis. Identification by BCID2 PCR.     BOTTLE TYPE Aerobic Bottle             Radiology Data:   Imaging Results (Last 24 Hours)       Procedure Component Value Units Date/Time    XR Chest PA & Lateral [[id number]] Collected: 08/28/24 0625     Updated: 08/28/24 0630    Narrative:      EXAMINATION: XR CHEST PA AND LATERAL-     8/28/2024 3:31 AM     HISTORY: re-eval possible pneumonia; N17.9-Acute kidney failure,  unspecified; I47.19-Other supraventricular tachycardia; R79.89-Other  specified abnormal findings of blood chemistry; J18.9-Pneumonia,  unspecified organism     2 views of the chest are compared with the previous study dated  8/26/2024.     There is moderately more progressive bilateral lung infiltrate.     There is a small right basal pleural effusion which is moderately more  progressive since the previous study.     There is no pneumothorax.     There is moderate cardiomegaly. Atheromatous change of thoracic aorta  noted.     Moderate compression deformities of the lower thoracic vertebrae are  seen. There is accentuated thoracic kyphosis.     A right internal jugular approach Mediport system is in place. No  change.       Impression:      1. A moderately more progressive bilateral lung infiltrate and right  basal pleural effusion.        This report was signed and finalized on 8/28/2024 6:27 AM by Dr. Kelly Coleman MD.               I have reviewed the patient's current medications.     [Held by provider] allopurinol, 100 mg, Oral, Daily Before Lunch  aspirin, 81 mg, Oral, Daily  atorvastatin, 20 mg, Oral, Nightly  budesonide-formoterol, 2 puff, Inhalation, BID - RT  cetirizine, 10 mg, Oral, Daily  enoxaparin, 30 mg, Subcutaneous, Daily  isosorbide mononitrate, 30 mg, Oral, Daily Before Lunch  [Held by provider] losartan, 50 mg, Oral, Daily  metoprolol succinate XL, 50 mg, Oral, Daily  montelukast, 10 mg, Oral, Nightly  multivitamin with minerals, 1  tablet, Oral, Daily  pantoprazole, 40 mg, Oral, Daily  sodium bicarbonate, 650 mg, Oral, TID  sodium chloride, 10 mL, Intravenous, Q12H  tamsulosin, 0.4 mg, Oral, Nightly         Intake/Output Summary (Last 24 hours) at 8/28/2024 1322  Last data filed at 8/28/2024 1200  Gross per 24 hour   Intake 240 ml   Output 2325 ml   Net -2085 ml        Assessment/Plan   Assessment  Active Hospital Problems    Diagnosis     **Acute kidney injury superimposed on CKD stage 3b     Narrow complex tachycardia     Chronic heart failure with preserved ejection fraction (HFpEF)     Malignant neoplasm of upper lobe of right lung        Treatment Plan  1.  Acute kidney injury superimposed on CKD stage IIIb-patient's baseline creatinine is approximately 2, was discharged home on 8/7/2024 with a creatinine of 1.95.  2 patient's increasing blood pressure and improving creatinine we continued Bumex at discharge rather than his previous Lasix, he was restarted on his home losartan and allopurinol, as well as Dr. Bowen initiating Multaq.  This obviously worsened his kidney function.  Allopurinol, Multaq, and Cozaar were placed back on hold as well as Bumex.  Continue sodium bicarbonate 650 mg 3 times daily and gentle hydration until 1400 with repeat BMP if continued improvement to baseline of 2, IV hydration will be discontinued due to patient's severe propensity for fluid overload.    2.  Narrow complex tachycardia-patient presented in unstable narrow complex sinus tachycardia, patient was given etomidate and was cardioverted with 100 J with successful conversion to normal sinus rhythm.  Patient remains currently in normal sinus rhythm in the 70s.  8/29/2024 will be taken for AVM ablation and pacemaker per Dr. Bowen continue cardiac telemetry, Lovenox, and metoprolol.    3.  HFpEF-patient appears to be euvolemic from previous admission, immediate cessation of shortness of breath post conversion to normal sinus rhythm.  Due to patient's CAROLINA on  CKD, Cozaar will be held and continue on optimize care with  Imdur, and metoprolol.    4.  Malignant neoplasm of upper lobe of right lung-patient is currently followed by Dr. Delong, pulmonology, Dr. Rico, oncology, and Dr. Cool, radiation oncology.  Most recent PET scan revealed 8/15/2024 revealed 1.3 cm spiculated nodule in the right upper lobe additional concerns for pleural-based mass and suspicion for metastatic lymphadenopathy with posterior lateral pleural thickening in the right lung due to his interstitial disease. This was siilar to finding on CR, therefore due to afebrile, lack of respiratory symptom and normal WBC, ABX discontinued and will continue to follow.  Ultras initially showed abnormal staining with possible staph, overnight blood cultures were negative with negative staph PCR.  Will continue holding on antibiotics.    Medical Decision Making  Acute kidney injury, acute on chronic, high complexity, improving  Narrow complex tachycardia, acute, moderate complexity, improving  HFpEF, chronic, moderate complexity, stable  Malignant neoplasm of upper lobe of the right lung, chronic, high complexity, unchanged    Number and Complexity of problems: 4  Differential Diagnosis: None    Conditions and Status        Condition is improving.     [facility] Data  External documents reviewed: Epic review of previous hospitalizations, oncology, pulmonology radiation oncology and PCP office notes  Cardiac tracing (EKG, telemetry) interpretation: 8/28/2024 EKG per cardiology reviewed   Radiology interpretation: 8/15/2024 PET scan, 8/27/2024 chest x-ray per radiology reviewed  Labs reviewed: 8/28/2024 CBC, CMP BC x 2 reviewed, repeat in a.m. Any tests that were   considered but not ordered: None     Decision rules/scores evaluated (example [id number], Wells, etc): None     Discussed with: Dr. Macias, Dr. Bowen, cardiac electrophysiology and patient     Care Planning  Shared decision making: Dr. Macias,  Dr. Bowen, cardiac electrophysiology and patient  Code status and discussions: DNR/DNI per patient  Surrogate Decision Maker is son Beau or his brother Keshav    Disposition  Social Determinants of Health that impact treatment or disposition: Patient should return with resumption of home health  I expect the patient to be discharged to home with home health in 2 days.     Electronically signed by MARITZA Johnson, 08/28/24, 13:22 CDT.

## 2024-08-28 NOTE — CASE MANAGEMENT/SOCIAL WORK
Discharge Planning Assessment   [place]     Patient Name: Karoline Prater  MRN: [id number]  Today's Date: 8/28/2024    Admit Date: 8/26/2024        Discharge Needs Assessment       Row Name 08/28/24 0926       Living Environment    People in Home alone    Current Living Arrangements home    Primary Care Provided by self    Provides Primary Care For no one    Family Caregiver if Needed sibling(s)    Family Caregiver Names Keshav    Able to Return to Prior Arrangements yes       Resource/Environmental Concerns    Resource/Environmental Concerns none       Transition Planning    Patient/Family Anticipates Transition to home with family    Transportation Anticipated family or friend will provide       Discharge Needs Assessment    Readmission Within the Last 30 Days no previous admission in last 30 days    Equipment Currently Used at Home cane, straight;oxygen;walker, rolling    Concerns to be Addressed discharge planning    Equipment Needed After Discharge none    Outpatient/Agency/Support Group Needs homecare agency    Discharge Facility/Level of Care Needs home with home health    Discharge Coordination/Progress spoke to patient who lives alone with a brother that lives nearby and assists him if needed; has RX coverage oxygen 2.5LNC continuously with [facility] and PCP; patient says he has [facility] but has not been started yet; spoke to Marcela with [facility] and they were not able to take him when he was dcd last time due to staffing issues.  independent at home prior to illness will follow for DC needs                   Discharge Plan    No documentation.                 Continued Care and Services - Admitted Since 8/26/2024    No active coordination exists for this encounter.       Selected Continued Care - Prior Encounters Includes continued care and service providers with selected services from prior encounters from 5/28/2024 to 8/28/2024      Discharged on 8/8/2024 Admission date: 8/2/2024 -  Discharge disposition: Home-Health Care [facility]      Home Medical Care       Service Provider Selected Services Address Phone Fax Patient Preferred    [address], [facility] [address] [phone] --                          Expected Discharge Date and Time       Expected Discharge Date Expected Discharge Time    Aug 28, 2024            Demographic Summary    No documentation.                  Functional Status    No documentation.                  Psychosocial    No documentation.                  Abuse/Neglect    No documentation.                  Legal    No documentation.                  Substance Abuse    No documentation.                  Patient Forms    No documentation.                     Mariam Bautista RN

## 2024-08-29 PROBLEM — I48.92 ATRIAL FLUTTER: Status: ACTIVE | Noted: 2024-08-26

## 2024-08-29 PROBLEM — Z00.6 ENCOUNTER FOR EXAMINATION FOR NORMAL COMPARISON AND CONTROL IN CLINICAL RESEARCH PROGRAM: Status: ACTIVE | Noted: 2024-08-26

## 2024-08-29 LAB
ANION GAP SERPL CALCULATED.3IONS-SCNC: 8 MMOL/L (ref 5–15)
BUN SERPL-MCNC: 26 MG/DL (ref 8–23)
BUN/CREAT SERPL: 12.3 (ref 7–25)
CALCIUM SPEC-SCNC: 9.6 MG/DL (ref 8.6–10.5)
CHLORIDE SERPL-SCNC: 107 MMOL/L (ref 98–107)
CO2 SERPL-SCNC: 22 MMOL/L (ref 22–29)
CREAT SERPL-MCNC: 2.12 MG/DL (ref 0.76–1.27)
DEPRECATED RDW RBC AUTO: 57.5 FL (ref 37–54)
EGFRCR SERPLBLD CKD-EPI 2021: 30.5 ML/MIN/1.73
ERYTHROCYTE [DISTWIDTH] IN BLOOD BY AUTOMATED COUNT: 16.2 % (ref 12.3–15.4)
GLUCOSE SERPL-MCNC: 98 MG/DL (ref 65–99)
HCT VFR BLD AUTO: 32.8 % (ref 37.5–51)
HGB BLD-MCNC: 10.4 G/DL (ref 13–17.7)
MCH RBC QN AUTO: 30.5 PG (ref 26.6–33)
MCHC RBC AUTO-ENTMCNC: 31.7 G/DL (ref 31.5–35.7)
MCV RBC AUTO: 96.2 FL (ref 79–97)
PLATELET # BLD AUTO: 153 10*3/MM3 (ref 140–450)
PMV BLD AUTO: 11.7 FL (ref 6–12)
POTASSIUM SERPL-SCNC: 4.4 MMOL/L (ref 3.5–5.2)
QT INTERVAL: 328 MS
QTC INTERVAL: 416 MS
RBC # BLD AUTO: 3.41 10*6/MM3 (ref 4.14–5.8)
SODIUM SERPL-SCNC: 137 MMOL/L (ref 136–145)
WBC NRBC COR # BLD AUTO: 5.77 10*3/MM3 (ref 3.4–10.8)

## 2024-08-29 PROCEDURE — 93650 ICAR CATH ABLTJ AV NODE FUNC: CPT | Performed by: STUDENT IN AN ORGANIZED HEALTH CARE EDUCATION/TRAINING PROGRAM

## 2024-08-29 PROCEDURE — 25010000002 CEFAZOLIN PER 500 MG: Performed by: STUDENT IN AN ORGANIZED HEALTH CARE EDUCATION/TRAINING PROGRAM

## 2024-08-29 PROCEDURE — 33274 TCAT INSJ/RPL PERM LDLS PM: CPT | Performed by: STUDENT IN AN ORGANIZED HEALTH CARE EDUCATION/TRAINING PROGRAM

## 2024-08-29 PROCEDURE — 94799 UNLISTED PULMONARY SVC/PX: CPT

## 2024-08-29 PROCEDURE — C1894 INTRO/SHEATH, NON-LASER: HCPCS | Performed by: STUDENT IN AN ORGANIZED HEALTH CARE EDUCATION/TRAINING PROGRAM

## 2024-08-29 PROCEDURE — C1759 CATH, INTRA ECHOCARDIOGRAPHY: HCPCS | Performed by: STUDENT IN AN ORGANIZED HEALTH CARE EDUCATION/TRAINING PROGRAM

## 2024-08-29 PROCEDURE — 02HK3NZ INSERTION OF INTRACARDIAC PACEMAKER INTO RIGHT VENTRICLE, PERCUTANEOUS APPROACH: ICD-10-PCS | Performed by: STUDENT IN AN ORGANIZED HEALTH CARE EDUCATION/TRAINING PROGRAM

## 2024-08-29 PROCEDURE — 80048 BASIC METABOLIC PNL TOTAL CA: CPT

## 2024-08-29 PROCEDURE — 94761 N-INVAS EAR/PLS OXIMETRY MLT: CPT

## 2024-08-29 PROCEDURE — 94664 DEMO&/EVAL PT USE INHALER: CPT

## 2024-08-29 PROCEDURE — 99221 1ST HOSP IP/OBS SF/LOW 40: CPT | Performed by: INTERNAL MEDICINE

## 2024-08-29 PROCEDURE — 93005 ELECTROCARDIOGRAM TRACING: CPT | Performed by: STUDENT IN AN ORGANIZED HEALTH CARE EDUCATION/TRAINING PROGRAM

## 2024-08-29 PROCEDURE — 99152 MOD SED SAME PHYS/QHP 5/>YRS: CPT | Performed by: STUDENT IN AN ORGANIZED HEALTH CARE EDUCATION/TRAINING PROGRAM

## 2024-08-29 PROCEDURE — 99153 MOD SED SAME PHYS/QHP EA: CPT | Performed by: STUDENT IN AN ORGANIZED HEALTH CARE EDUCATION/TRAINING PROGRAM

## 2024-08-29 PROCEDURE — C1732 CATH, EP, DIAG/ABL, 3D/VECT: HCPCS | Performed by: STUDENT IN AN ORGANIZED HEALTH CARE EDUCATION/TRAINING PROGRAM

## 2024-08-29 PROCEDURE — 25010000002 MIDAZOLAM HCL (PF) 5 MG/5ML SOLUTION: Performed by: STUDENT IN AN ORGANIZED HEALTH CARE EDUCATION/TRAINING PROGRAM

## 2024-08-29 PROCEDURE — 85027 COMPLETE CBC AUTOMATED: CPT

## 2024-08-29 PROCEDURE — 93010 ELECTROCARDIOGRAM REPORT: CPT | Performed by: EMERGENCY MEDICINE

## 2024-08-29 PROCEDURE — 25510000001 IOPAMIDOL PER 1 ML: Performed by: STUDENT IN AN ORGANIZED HEALTH CARE EDUCATION/TRAINING PROGRAM

## 2024-08-29 PROCEDURE — 02583ZZ DESTRUCTION OF CONDUCTION MECHANISM, PERCUTANEOUS APPROACH: ICD-10-PCS | Performed by: STUDENT IN AN ORGANIZED HEALTH CARE EDUCATION/TRAINING PROGRAM

## 2024-08-29 PROCEDURE — 25010000002 FENTANYL CITRATE (PF) 50 MCG/ML SOLUTION: Performed by: STUDENT IN AN ORGANIZED HEALTH CARE EDUCATION/TRAINING PROGRAM

## 2024-08-29 PROCEDURE — C1786 PMKR, SINGLE, RATE-RESP: HCPCS | Performed by: STUDENT IN AN ORGANIZED HEALTH CARE EDUCATION/TRAINING PROGRAM

## 2024-08-29 DEVICE — SYS PACE MICRA LD/LESS AV2: Type: IMPLANTABLE DEVICE | Site: HEART | Status: FUNCTIONAL

## 2024-08-29 RX ORDER — MIDAZOLAM HYDROCHLORIDE 5 MG/5ML
INJECTION, SOLUTION INTRAMUSCULAR; INTRAVENOUS
Status: DISCONTINUED | OUTPATIENT
Start: 2024-08-29 | End: 2024-08-29 | Stop reason: HOSPADM

## 2024-08-29 RX ORDER — SODIUM CHLORIDE 0.9 % (FLUSH) 0.9 %
10 SYRINGE (ML) INJECTION EVERY 12 HOURS SCHEDULED
Status: DISCONTINUED | OUTPATIENT
Start: 2024-08-29 | End: 2024-08-29 | Stop reason: HOSPADM

## 2024-08-29 RX ORDER — FENTANYL CITRATE 50 UG/ML
INJECTION, SOLUTION INTRAMUSCULAR; INTRAVENOUS
Status: DISCONTINUED | OUTPATIENT
Start: 2024-08-29 | End: 2024-08-29 | Stop reason: HOSPADM

## 2024-08-29 RX ORDER — SODIUM CHLORIDE 9 MG/ML
50 INJECTION, SOLUTION INTRAVENOUS CONTINUOUS
Status: DISCONTINUED | OUTPATIENT
Start: 2024-08-29 | End: 2024-08-29

## 2024-08-29 RX ORDER — IOPAMIDOL 755 MG/ML
INJECTION, SOLUTION INTRAVASCULAR
Status: DISCONTINUED | OUTPATIENT
Start: 2024-08-29 | End: 2024-08-29 | Stop reason: HOSPADM

## 2024-08-29 RX ORDER — LIDOCAINE HYDROCHLORIDE 20 MG/ML
INJECTION, SOLUTION INFILTRATION; PERINEURAL
Status: DISCONTINUED | OUTPATIENT
Start: 2024-08-29 | End: 2024-08-29 | Stop reason: HOSPADM

## 2024-08-29 RX ORDER — SODIUM CHLORIDE 0.9 % (FLUSH) 0.9 %
10 SYRINGE (ML) INJECTION AS NEEDED
Status: DISCONTINUED | OUTPATIENT
Start: 2024-08-29 | End: 2024-08-29 | Stop reason: HOSPADM

## 2024-08-29 RX ORDER — SODIUM CHLORIDE 9 MG/ML
40 INJECTION, SOLUTION INTRAVENOUS AS NEEDED
Status: DISCONTINUED | OUTPATIENT
Start: 2024-08-29 | End: 2024-08-29 | Stop reason: HOSPADM

## 2024-08-29 RX ADMIN — ATORVASTATIN CALCIUM 20 MG: 10 TABLET, FILM COATED ORAL at 20:33

## 2024-08-29 RX ADMIN — MONTELUKAST SODIUM 10 MG: 10 TABLET ORAL at 20:33

## 2024-08-29 RX ADMIN — Medication 1 TABLET: at 08:28

## 2024-08-29 RX ADMIN — Medication 10 ML: at 20:33

## 2024-08-29 RX ADMIN — SODIUM BICARBONATE 650 MG: 650 TABLET ORAL at 20:33

## 2024-08-29 RX ADMIN — BUDESONIDE AND FORMOTEROL FUMARATE DIHYDRATE 2 PUFF: 160; 4.5 AEROSOL RESPIRATORY (INHALATION) at 20:39

## 2024-08-29 RX ADMIN — SODIUM BICARBONATE 650 MG: 650 TABLET ORAL at 08:28

## 2024-08-29 RX ADMIN — PANTOPRAZOLE SODIUM 40 MG: 40 TABLET, DELAYED RELEASE ORAL at 08:28

## 2024-08-29 RX ADMIN — CEFAZOLIN 2 G: 2 INJECTION, POWDER, FOR SOLUTION INTRAMUSCULAR; INTRAVENOUS at 13:40

## 2024-08-29 RX ADMIN — Medication 10 ML: at 08:29

## 2024-08-29 RX ADMIN — METOPROLOL SUCCINATE 50 MG: 50 TABLET, EXTENDED RELEASE ORAL at 08:28

## 2024-08-29 RX ADMIN — ISOSORBIDE MONONITRATE 30 MG: 30 TABLET, EXTENDED RELEASE ORAL at 11:38

## 2024-08-29 RX ADMIN — BUDESONIDE AND FORMOTEROL FUMARATE DIHYDRATE 2 PUFF: 160; 4.5 AEROSOL RESPIRATORY (INHALATION) at 06:45

## 2024-08-29 RX ADMIN — SODIUM BICARBONATE 650 MG: 650 TABLET ORAL at 16:36

## 2024-08-29 RX ADMIN — ASPIRIN 81 MG: 81 TABLET, COATED ORAL at 08:28

## 2024-08-29 RX ADMIN — TAMSULOSIN HYDROCHLORIDE 0.4 MG: 0.4 CAPSULE ORAL at 20:33

## 2024-08-29 RX ADMIN — CETIRIZINE HYDROCHLORIDE 10 MG: 10 TABLET ORAL at 08:28

## 2024-08-29 NOTE — PLAN OF CARE
Goal Outcome Evaluation:  Plan of Care Reviewed With: patient        Progress: no change  Outcome Evaluation: No co pain. He is SOA with activity. He is on 3L per NC. Micro pacer and AV ablation today per right groin. Site is soft and drg is C/D/I. Possible home in am. Cont to monitor.

## 2024-08-29 NOTE — CONSULTS
"INFECTIOUS DISEASES CONSULT NOTE    Patient:  Karoline Prater 84 y.o. male  ROOM # 460/1  YOB: 1940  MRN: [id number]  [facility]:  [id number]  Admit date: 8/26/2024   Admitting Physician: Lencho Macias*  Primary Care Physician: Irving Alexis MD  REFERRING PROVIDER: Jen Peñaloza DO    Reason for Consultation: \"Positive blood cultures\"    History of Present Illness/Chief Complaint: Pleasant 84-year-old man.  He indicates at home he noticed weakness and fatigue.  When he would walk across the room he would feel dyspneic and almost feel like he was going to pass out.  He presented to the hospital.  He was found to have arrhythmia.  Today he underwent ablation and pacemaker placement.  He has not had fevers or chills.  He has not had night sweats.  He has not had appetite problems or weight loss.  He had 1 of 4 bottles grow coagulase-negative staph.  Infectious diseases asked to evaluate and offer recommendations.    Current Scheduled Medications:   [Held by provider] allopurinol, 100 mg, Oral, Daily Before Lunch  aspirin, 81 mg, Oral, Daily  atorvastatin, 20 mg, Oral, Nightly  budesonide-formoterol, 2 puff, Inhalation, BID - RT  ceFAZolin, 2 g, Intravenous, Once  cetirizine, 10 mg, Oral, Daily  enoxaparin, 30 mg, Subcutaneous, Daily  isosorbide mononitrate, 30 mg, Oral, Daily Before Lunch  [Held by provider] losartan, 50 mg, Oral, Daily  metoprolol succinate XL, 50 mg, Oral, Daily  montelukast, 10 mg, Oral, Nightly  multivitamin with minerals, 1 tablet, Oral, Daily  pantoprazole, 40 mg, Oral, Daily  sodium bicarbonate, 650 mg, Oral, TID  sodium chloride, 10 mL, Intravenous, Q12H  sodium chloride, 10 mL, Intravenous, Q12H  tamsulosin, 0.4 mg, Oral, Nightly      Current PRN Medications:    acetaminophen    senna-docusate sodium **AND** polyethylene glycol **AND** bisacodyl **AND** bisacodyl    melatonin    ondansetron    sodium chloride    sodium chloride    sodium chloride    " "sodium chloride    Allergies:    Allergies   Allergen Reactions    Penicillins Hives     Past Medical History: Atrial fibrillation.  BPH.  Blindness of left eye.  Congestive heart failure.  Chronic kidney disease.  Hypertension.  Fibrotic lung disease.  Lung cancer.      Past Surgical History: Cystoscopy and stenting.  Eye surgery.  Foot surgery.  Cholecystectomy.  Ifwoeb-o-Kudl placement.    Social History: Prior history of tobacco use.  No alcohol use.  No illicit drug use.    Family History: Hypertension.  Leukemia.    Exposure History: No close contacts have been ill    Review of Systems see HPI.  In addition no recent skin infections or boils.    Vital Signs:  /72 (BP Location: Right arm, Patient Position: Lying)   Pulse 100   Temp 98.6 °F (37 °C) (Oral)   Resp 18   Ht 162.6 cm (64\")   Wt 74.8 kg (164 lb 14.5 oz)   SpO2 94%   BMI 28.31 kg/m²  Temp (24hrs), Av.2 °F (36.8 °C), Min:97.1 °F (36.2 °C), Max:98.8 °F (37.1 °C)    Physical Exam  Vital signs - reviewed.  Line/IV site - No erythema or tenderness.  Alert, pleasant, no distress  Sclera nonicteric  No conjunctival hemorrhages  Lungs without crackles  Heart without murmur  Jsmfzo-v-Rqhm right upper chest without erythema or induration  Abdomen is soft and nontender    Lab Results:  CBC:   Results from last 7 days   Lab Units 24  0403 24  0425 24  0320 247   WBC 10*3/mm3 5.77 5.58 7.62 10.22   HEMOGLOBIN g/dL 10.4* 9.6* 10.3* 11.1*   HEMATOCRIT % 32.8* 31.9* 33.7* 35.2*   PLATELETS 10*3/mm3 153 131* 133* 140     CMP:   Results from last 7 days   Lab Units 24  0403 24  1557 24  0425 24  0320 24  2348 24  2127   SODIUM mmol/L 137 139 136 136  --  135*   POTASSIUM mmol/L 4.4 4.6 4.5 4.8 5.0 5.9*   CHLORIDE mmol/L 107 107 111* 107  --  100   CO2 mmol/L 22.0 23.0 20.0* 20.0*  --  23.0   BUN mg/dL 26* 30* 36* 44*  --  51*   CREATININE mg/dL 2.12* 2.39* 2.67* 3.47*  --  3.78*   CALCIUM " mg/dL 9.6 9.9 9.3 8.9  --  9.7   BILIRUBIN mg/dL  --   --   --  0.5  --  0.7   ALK PHOS U/L  --   --   --  85  --  101   ALT (SGPT) U/L  --   --   --  26  --  30   AST (SGOT) U/L  --   --   --  25  --  37   GLUCOSE mg/dL 98 136* 110* 103*  --  202*     Culture results:  Blood cultures August 26, [id number] Staphylococcus (aerobic bottle)  Blood cultures August 26, 2024-no growth    Antigen studies collected August 27, 2024  Urine pneumococcal antigen-negative  Urine Legionella antigen-negative    Review of previous records:  Blood cultures June 29, 2024-no growth  Blood cultures June 29, 2024-no growth  Respiratory virus panel August 2, 2024-negative  Respiratory virus panel June 29, 2024-negative    Radiology:     Chest x-ray PA and lateral done August 28, 2024:  HISTORY: re-eval possible pneumonia; N17.9-Acute kidney failure,  unspecified; I47.19-Other supraventricular tachycardia; R79.89-Other  specified abnormal findings of blood chemistry; J18.9-Pneumonia,  unspecified organism     2 views of the chest are compared with the previous study dated  8/26/2024.     There is moderately more progressive bilateral lung infiltrate.     There is a small right basal pleural effusion which is moderately more  progressive since the previous study.     There is no pneumothorax.     There is moderate cardiomegaly. Atheromatous change of thoracic aorta  noted.     Moderate compression deformities of the lower thoracic vertebrae are  seen. There is accentuated thoracic kyphosis.     A right internal jugular approach Mediport system is in place. No  change.     IMPRESSION:  1. A moderately more progressive bilateral lung infiltrate and right  basal pleural effusion.      Additional Studies Reviewed:   Transthoracic echocardiogram done July 3, 2024:    Left ventricular systolic function is normal. Left ventricular ejection fraction appears to be 61 - 65%.    Left ventricular diastolic dysfunction is noted.    Normal  right ventricular cavity size and systolic function noted.    There is mild thickening of the aortic valve. No aortic valve regurgitation is present. No hemodynamically significant aortic valve stenosis is present.    No mitral valve regurgitation or significant stenosis is present. Mild mitral annular calcification is present.    Mild pulmonary hypertension is present.      Impression:   1.  He presented with dyspnea with exertion.  Likely related to his arrhythmia.  He has had ablation and pacemaker placement.  2.  1 of 4 blood culture bottles positive for coagulase-negative staph-contaminant    Recommendations:    Do not feel further workup indicated at present for his positive blood culture  Suspect the positive blood culture represents contaminant as he was not manifesting signs or symptoms suggestive of infection and only 1 out of 4 bottles has growth.  Do not feel he needs any antibiotic treatment at present.  I would be happy to reassess if fever or additional blood cultures turn positive  Otherwise will sign off for now  Please call if additional questions for infectious diseases    Vijay Downs MD  08/29/24  12:54 CDT

## 2024-08-29 NOTE — PLAN OF CARE
Problem: Adult Inpatient Plan of Care  Goal: Plan of Care Review  Outcome: Ongoing, Progressing  Goal: Patient-Specific Goal (Individualized)  Outcome: Ongoing, Progressing  Goal: Absence of Hospital-Acquired Illness or Injury  Outcome: Ongoing, Progressing  Intervention: Identify and Manage Fall Risk  Recent Flowsheet Documentation  Taken 8/29/2024 0400 by Gladis Davis RN  Safety Promotion/Fall Prevention: safety round/check completed  Taken 8/29/2024 0200 by Gladis Davis RN  Safety Promotion/Fall Prevention: safety round/check completed  Taken 8/29/2024 0000 by Gladis Davis RN  Safety Promotion/Fall Prevention: safety round/check completed  Taken 8/28/2024 2200 by Gladis Davis RN  Safety Promotion/Fall Prevention: safety round/check completed  Taken 8/28/2024 2100 by Gladis Davis RN  Safety Promotion/Fall Prevention: safety round/check completed  Taken 8/28/2024 2000 by Gladis Davis RN  Safety Promotion/Fall Prevention: safety round/check completed  Intervention: Prevent Skin Injury  Recent Flowsheet Documentation  Taken 8/28/2024 2000 by Gladis Davis RN  Body Position: position changed independently  Skin Protection:   adhesive use limited   tubing/devices free from skin contact  Intervention: Prevent and Manage VTE (Venous Thromboembolism) Risk  Recent Flowsheet Documentation  Taken 8/28/2024 2000 by Gladis Davis RN  Activity Management: ambulated to bathroom  VTE Prevention/Management: other (see comments)  Range of Motion: active ROM (range of motion) encouraged  Intervention: Prevent Infection  Recent Flowsheet Documentation  Taken 8/28/2024 2000 by Gladis Davis RN  Infection Prevention:   single patient room provided   rest/sleep promoted  Goal: Optimal Comfort and Wellbeing  Outcome: Ongoing, Progressing  Intervention: Provide Person-Centered Care  Recent Flowsheet Documentation  Taken 8/28/2024 2000 by Gladis Davis RN  Trust Relationship/Rapport:   care explained   questions  encouraged   questions answered  Goal: Readiness for Transition of Care  Outcome: Ongoing, Progressing     Problem: Fall Injury Risk  Goal: Absence of Fall and Fall-Related Injury  Outcome: Ongoing, Progressing  Intervention: Identify and Manage Contributors  Recent Flowsheet Documentation  Taken 8/28/2024 2000 by Gladis Davis RN  Medication Review/Management: medications reviewed  Self-Care Promotion: independence encouraged  Intervention: Promote Injury-Free Environment  Recent Flowsheet Documentation  Taken 8/29/2024 0400 by Gladis Davis RN  Safety Promotion/Fall Prevention: safety round/check completed  Taken 8/29/2024 0200 by Gladis Davis RN  Safety Promotion/Fall Prevention: safety round/check completed  Taken 8/29/2024 0000 by Gladis Davis RN  Safety Promotion/Fall Prevention: safety round/check completed  Taken 8/28/2024 2200 by Gladis Davis RN  Safety Promotion/Fall Prevention: safety round/check completed  Taken 8/28/2024 2100 by Gladis Davis RN  Safety Promotion/Fall Prevention: safety round/check completed  Taken 8/28/2024 2000 by Gladis Davis RN  Safety Promotion/Fall Prevention: safety round/check completed     Problem: Fluid and Electrolyte Imbalance (Acute Kidney Injury/Impairment)  Goal: Fluid and Electrolyte Balance  Outcome: Ongoing, Progressing  Intervention: Monitor and Manage Fluid and Electrolyte Balance  Recent Flowsheet Documentation  Taken 8/28/2024 2000 by Gladis Davis RN  Fluid/Electrolyte Management: fluids provided     Problem: Oral Intake Inadequate (Acute Kidney Injury/Impairment)  Goal: Optimal Nutrition Intake  Outcome: Ongoing, Progressing     Problem: Renal Function Impairment (Acute Kidney Injury/Impairment)  Goal: Effective Renal Function  Outcome: Ongoing, Progressing  Intervention: Monitor and Support Renal Function  Recent Flowsheet Documentation  Taken 8/28/2024 2000 by Gladis Davis RN  Medication Review/Management: medications reviewed     Problem: Gas  Exchange Impaired  Goal: Optimal Gas Exchange  Outcome: Ongoing, Progressing  Intervention: Optimize Oxygenation and Ventilation  Recent Flowsheet Documentation  Taken 8/28/2024 2000 by Gladis Davis RN  Head of Bed (HOB) Positioning: HOB at 30-45 degrees     Problem: Adjustment to Illness (Heart Failure)  Goal: Optimal Coping  Outcome: Ongoing, Progressing  Intervention: Support Psychosocial Response  Recent Flowsheet Documentation  Taken 8/28/2024 2000 by Gladis Davis RN  Family/Support System Care: support provided     Problem: Cardiac Output Decreased (Heart Failure)  Goal: Optimal Cardiac Output  Outcome: Ongoing, Progressing     Problem: Dysrhythmia (Heart Failure)  Goal: Stable Heart Rate and Rhythm  Outcome: Ongoing, Progressing     Problem: Fluid Imbalance (Heart Failure)  Goal: Fluid Balance  Outcome: Ongoing, Progressing  Intervention: Monitor and Manage Fluid Balance  Recent Flowsheet Documentation  Taken 8/28/2024 2000 by Gladis Davis RN  Fluid/Electrolyte Management: fluids provided     Problem: Functional Ability Impaired (Heart Failure)  Goal: Optimal Functional Ability  Outcome: Ongoing, Progressing  Intervention: Optimize Functional Ability  Recent Flowsheet Documentation  Taken 8/28/2024 2000 by Gladis Davis RN  Activity Management: ambulated to bathroom  Self-Care Promotion: independence encouraged     Problem: Oral Intake Inadequate (Heart Failure)  Goal: Optimal Nutrition Intake  Outcome: Ongoing, Progressing     Problem: Respiratory Compromise (Heart Failure)  Goal: Effective Oxygenation and Ventilation  Outcome: Ongoing, Progressing  Intervention: Promote Airway Secretion Clearance  Recent Flowsheet Documentation  Taken 8/28/2024 2000 by Gladis Davis RN  Cough And Deep Breathing: done independently per patient     Problem: Sleep Disordered Breathing (Heart Failure)  Goal: Effective Breathing Pattern During Sleep  Outcome: Ongoing, Progressing     Problem: Coping Ineffective (Oncology  Care)  Goal: Effective Coping  Outcome: Ongoing, Progressing  Intervention: Support and Enhance Coping Strategies  Recent Flowsheet Documentation  Taken 8/28/2024 2000 by Gladis Davis RN  Family/Support System Care: support provided     Problem: Fatigue (Oncology Care)  Goal: Improved Activity Tolerance  Outcome: Ongoing, Progressing  Intervention: Promote Improved Energy  Recent Flowsheet Documentation  Taken 8/28/2024 2000 by Gladis Davis RN  Activity Management: ambulated to bathroom     Problem: Oral Intake Altered (Oncology Care)  Goal: Optimal Oral Intake  Outcome: Ongoing, Progressing     Problem: Oral Mucositis (Oncology Care)  Goal: Improved Oral Mucous Membrane Integrity  Outcome: Ongoing, Progressing     Problem: Pain Acute (Oncology Care)  Goal: Optimal Pain Control  Outcome: Ongoing, Progressing  Intervention: Prevent or Manage Pain  Recent Flowsheet Documentation  Taken 8/28/2024 2000 by Gladis Davis RN  Medication Review/Management: medications reviewed  Intervention: Optimize Psychosocial Wellbeing  Recent Flowsheet Documentation  Taken 8/28/2024 2000 by Gladis Davis RN  Diversional Activities: television     Problem: Infection  Goal: Absence of Infection Signs and Symptoms  Outcome: Ongoing, Progressing     Problem: Heart Failure Comorbidity  Goal: Maintenance of Heart Failure Symptom Control  Outcome: Ongoing, Progressing  Intervention: Maintain Heart Failure-Management  Recent Flowsheet Documentation  Taken 8/28/2024 2000 by Gladis Davis RN  Medication Review/Management: medications reviewed   Goal Outcome Evaluation:

## 2024-08-29 NOTE — PROGRESS NOTES
Patient Name: Karoline Prater  Date of Admission: 8/26/2024  Today's Date: 08/29/24  Length of Stay: 1  Primary Care Physician: Irving Alexis MD    Subjective   Chief Complaint: Shortness of breath  Shortness of Breath       Karoline Prater 84-year-old white male with past medical history of GERD, blindness of his left eye, hypertension, BPH, pancreatic cancer with metastasis to the right lung currently being followed by Dr. Rico and Dr. Cool, PET scan 8/15/2024 revealed 1.3 cm spiculated nodule in the right upper lobe additional concerns for pleural-based mass and suspicion for metastatic lymphadenopathy with posterior lateral pleural thickening in the right lung due to his interstitial disease. HFpEF, last EF 61 to 65% on previous hospitalization.  CKD stage IIIb and fibrotic lung disease.  Patient presented to [facility] emergency department on 8/26/2024 with complaints of extreme shortness of breath.  Patient states he was having difficulty ambulating back and forth to the bathroom with new onset of significant shortness of breath and he felt like his heart was racing.  Upon presentation patient was narrow complex sinus tachycardia, he was given a dose of etomidate and 1 sick shock at 100 J and converted to normal sinus rhythm.  Chest x-ray with mildly increasing markings in right upper lobe, treated for possible pneumonia with Rocephin and Zithromax.  The workup revealed an at bedtime troponin of 117 with a delta of 6, NA of 135, creatinine of 3.78, lipase of 10, procalcitonin of 0.3, WBC of 10.22 hemoglobin 11 with hematocrit of 35.2.  Patient was additionally given a 1 L normal saline bolus for undetermined reason and admitted for overnight evaluation and treatment    Today:   Is resting comfortably in bed on his home dose of 2 L with his brother at bedside.  Patient is alert and oriented and able to participate in assessment.  Patient states he continues to feel feels at his baseline or better  as far as his respiratory status.  The only complaint patient had today was his nose was dry after overnight oxygenation which she states happens every time he is in the hospital.  Patient was administered Adderall and given to use as needed every hour.  Patient is n.p.o. awaiting AVM ablation and pacemaker per Dr. Bowen later this morning.   All patient's questions were answered to the best my ability and patient continues to be in agreement for discharge home if stable postprocedure.  Documented weights    08/26/24 2111 08/27/24 0326 08/27/24 0327   Weight: 74.8 kg (164 lb 14.5 oz) 74.8 kg (164 lb 14.5 oz) 74.8 kg (164 lb 14.5 oz)        Review of Systems   HENT:          Mild bloody discharge due to dry nostrils   Eyes: Negative.    Respiratory: Negative.     Cardiovascular: Negative.    Gastrointestinal: Negative.       All pertinent negatives and positives are as above. All other systems have been reviewed and are negative unless otherwise stated.     Objective    Temp:  [97.1 °F (36.2 °C)-98.8 °F (37.1 °C)] 98.6 °F (37 °C)  Heart Rate:  [] 100  Resp:  [18] 18  BP: (110-134)/(61-80) 134/72  Physical Exam  Vitals and nursing note reviewed.   Constitutional:       Comments: Patient is resting comfortably in bed on his home 2 L of oxygen.  His brother is at bedside   HENT:      Nose: Nose normal. No congestion or rhinorrhea.      Mouth/Throat:      Mouth: Mucous membranes are moist.      Pharynx: Oropharynx is clear.   Eyes:      Pupils: Pupils are equal, round, and reactive to light.   Cardiovascular:      Rate and Rhythm: Normal rate and regular rhythm.      Comments: Since cardioversion patient has remained normal sinus rhythm in the 70s  Pulmonary:      Effort: No tachypnea, accessory muscle usage or respiratory distress.      Breath sounds: Examination of the right-lower field reveals rhonchi. Examination of the left-lower field reveals rhonchi.      Comments: Chronic lower lobe rhonchi  Abdominal:       General: Abdomen is flat.      Palpations: Abdomen is soft.   Genitourinary:     Comments: Voiding per urinal  Musculoskeletal:      Right lower leg: No edema.      Left lower leg: No edema.   Skin:     General: Skin is warm.      Capillary Refill: Capillary refill takes less than 2 seconds.      Coloration: Skin is pale.      Comments: Mild chronic erythema to BLE, significantly improved since previous discharge   Neurological:      General: No focal deficit present.      Mental Status: He is oriented to person, place, and time.   Psychiatric:         Mood and Affect: Mood normal.         Behavior: Behavior normal.         Thought Content: Thought content normal.         Judgment: Judgment normal.         Results Review:  I have reviewed the labs, radiology results, and diagnostic studies.    Laboratory Data:   Results from last 7 days   Lab Units 08/29/24  0403 08/28/24  0425 08/27/24  0320   WBC 10*3/mm3 5.77 5.58 7.62   HEMOGLOBIN g/dL 10.4* 9.6* 10.3*   HEMATOCRIT % 32.8* 31.9* 33.7*   PLATELETS 10*3/mm3 153 131* 133*        Results from last 7 days   Lab Units 08/29/24  0403 08/28/24  1557 08/28/24  0425 08/27/24  0320 08/26/24  2348 08/26/24  2127   SODIUM mmol/L 137 139 136 136  --  135*   POTASSIUM mmol/L 4.4 4.6 4.5 4.8   < > 5.9*   CHLORIDE mmol/L 107 107 111* 107  --  100   CO2 mmol/L 22.0 23.0 20.0* 20.0*  --  23.0   BUN mg/dL 26* 30* 36* 44*  --  51*   CREATININE mg/dL 2.12* 2.39* 2.67* 3.47*  --  3.78*   CALCIUM mg/dL 9.6 9.9 9.3 8.9  --  9.7   BILIRUBIN mg/dL  --   --   --  0.5  --  0.7   ALK PHOS U/L  --   --   --  85  --  101   ALT (SGPT) U/L  --   --   --  26  --  30   AST (SGOT) U/L  --   --   --  25  --  37   GLUCOSE mg/dL 98 136* 110* 103*  --  202*    < > = values in this interval not displayed.       Microbiology Results (last 10 days)       Procedure Component Value - Date/Time    S. Pneumo Ag Urine or CSF - Urine, Urine, Clean Catch [[id number]]  (Normal) Collected: 08/27/24 2020    Lab  Status: Final result Specimen: Urine, Clean Catch Updated: 08/27/24 2049     Strep Pneumo Ag Negative    Legionella Antigen, Urine - Urine, Urine, Clean Catch [[id number]]  (Normal) Collected: 08/27/24 2020    Lab Status: Final result Specimen: Urine, Clean Catch Updated: 08/27/24 2048     LEGIONELLA ANTIGEN, URINE Negative    MRSA Screen, PCR (Inpatient) - Swab, Nares [[id number]]  (Normal) Collected: 08/27/24 2008    Lab Status: Final result Specimen: Swab from Nares Updated: 08/27/24 2133     MRSA PCR No MRSA Detected    Narrative:      The negative predictive value of this diagnostic test is high and should only be used to consider de-escalating anti-MRSA therapy. A positive result may indicate colonization with MRSA and must be correlated clinically.    Blood Culture - Blood, Arm, Right [[id number]]  (Normal) Collected: 08/26/24 2130    Lab Status: Preliminary result Specimen: Blood from Arm, Right Updated: 08/28/24 2200     Blood Culture No growth at 2 days    Blood Culture - Blood, Arm, Left [[id number]]  (Abnormal) Collected: 08/26/24 2125    Lab Status: Preliminary result Specimen: Blood from Arm, Left Updated: 08/29/24 0638     Blood Culture Staphylococcus, coagulase negative     Isolated from Aerobic Bottle     Gram Stain Aerobic Bottle Gram positive cocci      Anaerobic Bottle Gram positive bacilli    Narrative:      Probable contaminant requires clinical correlation, susceptibility not performed unless requested by physician.      Blood culture does not meet the specified criteria for PCR identification.  If pregnant, immunocompromised, or clinical concern for meningitis, call lab to run BCID for Listeria monocytogenes.    Blood Culture ID, PCR - Blood, Arm, Left [[id number]]  (Abnormal) Collected: 08/26/24 2125    Lab Status: Final result Specimen: Blood from Arm, Left Updated: 08/27/24 1704     BCID, PCR Staph spp, not aureus or lugdunensis. Identification by BCID2 PCR.     BOTTLE TYPE Aerobic Bottle              Radiology Data:   Imaging Results (Last 24 Hours)       ** No results found for the last 24 hours. **            I have reviewed the patient's current medications.     [Held by provider] allopurinol, 100 mg, Oral, Daily Before Lunch  aspirin, 81 mg, Oral, Daily  atorvastatin, 20 mg, Oral, Nightly  budesonide-formoterol, 2 puff, Inhalation, BID - RT  cetirizine, 10 mg, Oral, Daily  enoxaparin, 30 mg, Subcutaneous, Daily  isosorbide mononitrate, 30 mg, Oral, Daily Before Lunch  [Held by provider] losartan, 50 mg, Oral, Daily  metoprolol succinate XL, 50 mg, Oral, Daily  montelukast, 10 mg, Oral, Nightly  multivitamin with minerals, 1 tablet, Oral, Daily  pantoprazole, 40 mg, Oral, Daily  sodium bicarbonate, 650 mg, Oral, TID  sodium chloride, 10 mL, Intravenous, Q12H  tamsulosin, 0.4 mg, Oral, Nightly         Intake/Output Summary (Last 24 hours) at 8/29/2024 1040  Last data filed at 8/29/2024 0859  Gross per 24 hour   Intake 120 ml   Output 1950 ml   Net -1830 ml        Assessment/Plan   Assessment  Active Hospital Problems    Diagnosis     **Acute kidney injury superimposed on CKD stage 3b     Narrow complex tachycardia     Chronic heart failure with preserved ejection fraction (HFpEF)     A-fib     Malignant neoplasm of upper lobe of right lung        Treatment Plan  1.  Acute kidney injury superimposed on CKD stage IIIb-patient's baseline creatinine is approximately 2, was discharged home on 8/7/2024 with a creatinine of 1.95.  2 patient's increasing blood pressure and improving creatinine we continued Bumex at discharge rather than his previous Lasix, he was restarted on his home losartan and allopurinol, as well as Dr. Bowen initiating Multaq.  This obviously worsened his kidney function.  Allopurinol, Multaq, and Cozaar were placed back on hold as well as Bumex.  Initiate sodium bicarbonate 650 mg 3 times daily.  Patient received approximately 6 hours of hydration yesterday, with repeat BMP 1600,  revealing mild improvement of creatinine to 2.39.  This a.m. creatinine has improved closer to baseline at 2.12, will continue hydration for an additional 6 hours.    2.  Narrow complex tachycardia-patient presented in unstable narrow complex sinus tachycardia, patient was given etomidate and was cardioverted with 100 J with successful conversion to normal sinus rhythm.  Patient remains currently in normal sinus rhythm in the 70s.  Patient is awaiting AVN ablation and pacemaker Dr. Bowen today.  Continue cardiac telemetry, Lovenox, and metoprolol.    3.  HFpEF-patient appears to be euvolemic from previous admission, immediate cessation of shortness of breath post conversion to normal sinus rhythm.  Due to patient's CAROLINA on CKD, Cozaar will be held and continue on optimize care with  Imdur, and metoprolol.    4.  Malignant neoplasm of upper lobe of right lung-patient is currently followed by Dr. Delong, pulmonology, Dr. Rico, oncology, and Dr. Cool, radiation oncology.  Most recent PET scan revealed 8/15/2024 revealed 1.3 cm spiculated nodule in the right upper lobe additional concerns for pleural-based mass and suspicion for metastatic lymphadenopathy with posterior lateral pleural thickening in the right lung due to his interstitial disease. This was siilar to finding on CR, therefore due to afebrile, lack of respiratory symptom and normal WBC, ABX discontinued and will continue to follow.  Blood cultures negative, Legionella, strep pneumonia, MRSA all additionally negative, no antibiotics warranted will continue to follow.    Medical Decision Making  Acute kidney injury, acute on chronic, high complexity, improving  Narrow complex tachycardia, acute, moderate complexity, improving  HFpEF, chronic, moderate complexity, stable  Malignant neoplasm of upper lobe of the right lung, chronic, high complexity, unchanged    Number and Complexity of problems: 4  Differential Diagnosis: None    Conditions and Status         Condition is improving.     [facility] Data  External documents reviewed: Epic review of previous hospitalizations, oncology, pulmonology radiation oncology and PCP office notes  Cardiac tracing (EKG, telemetry) interpretation: Overnight telemetry normal sinus rhythm 85-98  Radiology interpretation:   Labs reviewed: 8/29/2024 CBC, CMP,  BC x 2 reviewed  Any tests that were considered but not ordered: None     Decision rules/scores evaluated (example [id number], Wells, etc): None     Discussed with: Dr. Macias, Dr. Bowen, cardiac electrophysiology and patient     Care Planning  Shared decision making: Dr. Macias, Dr. Bowen, cardiac electrophysiology and patient  Code status and discussions: DNR/DNI per patient  Surrogate Decision Maker is son Beau or his brother Keshav    Disposition  Social Determinants of Health that impact treatment or disposition: Patient should return with resumption of home health  I expect the patient to be discharged to home with home health in 1 day.     Electronically signed by MARITZA Johnson, 08/29/24, 10:40 CDT.

## 2024-08-30 ENCOUNTER — APPOINTMENT (OUTPATIENT)
Dept: CT IMAGING | Facility: HOSPITAL | Age: 84
DRG: 228 | End: 2024-08-30
Payer: MEDICARE

## 2024-08-30 VITALS
RESPIRATION RATE: 18 BRPM | WEIGHT: 164.9 LBS | HEART RATE: 58 BPM | OXYGEN SATURATION: 91 % | SYSTOLIC BLOOD PRESSURE: 130 MMHG | DIASTOLIC BLOOD PRESSURE: 58 MMHG | BODY MASS INDEX: 28.15 KG/M2 | TEMPERATURE: 98.2 F | HEIGHT: 64 IN

## 2024-08-30 LAB
ANION GAP SERPL CALCULATED.3IONS-SCNC: 10 MMOL/L (ref 5–15)
BACTERIA SPEC AEROBE CULT: ABNORMAL
BACTERIA SPEC AEROBE CULT: ABNORMAL
BUN SERPL-MCNC: 26 MG/DL (ref 8–23)
BUN/CREAT SERPL: 13.4 (ref 7–25)
CALCIUM SPEC-SCNC: 9.6 MG/DL (ref 8.6–10.5)
CHLORIDE SERPL-SCNC: 107 MMOL/L (ref 98–107)
CO2 SERPL-SCNC: 19 MMOL/L (ref 22–29)
CREAT SERPL-MCNC: 1.94 MG/DL (ref 0.76–1.27)
DEPRECATED RDW RBC AUTO: 55.8 FL (ref 37–54)
EGFRCR SERPLBLD CKD-EPI 2021: 33.9 ML/MIN/1.73
ERYTHROCYTE [DISTWIDTH] IN BLOOD BY AUTOMATED COUNT: 15.8 % (ref 12.3–15.4)
GLUCOSE SERPL-MCNC: 129 MG/DL (ref 65–99)
GRAM STN SPEC: ABNORMAL
GRAM STN SPEC: ABNORMAL
HCT VFR BLD AUTO: 36.4 % (ref 37.5–51)
HGB BLD-MCNC: 11.3 G/DL (ref 13–17.7)
ISOLATED FROM: ABNORMAL
ISOLATED FROM: ABNORMAL
MCH RBC QN AUTO: 29.8 PG (ref 26.6–33)
MCHC RBC AUTO-ENTMCNC: 31 G/DL (ref 31.5–35.7)
MCV RBC AUTO: 96 FL (ref 79–97)
PLATELET # BLD AUTO: 172 10*3/MM3 (ref 140–450)
PMV BLD AUTO: 11.1 FL (ref 6–12)
POTASSIUM SERPL-SCNC: 4.6 MMOL/L (ref 3.5–5.2)
RBC # BLD AUTO: 3.79 10*6/MM3 (ref 4.14–5.8)
SODIUM SERPL-SCNC: 136 MMOL/L (ref 136–145)
WBC NRBC COR # BLD AUTO: 7.51 10*3/MM3 (ref 3.4–10.8)

## 2024-08-30 PROCEDURE — 25010000002 ENOXAPARIN PER 10 MG: Performed by: STUDENT IN AN ORGANIZED HEALTH CARE EDUCATION/TRAINING PROGRAM

## 2024-08-30 PROCEDURE — 80048 BASIC METABOLIC PNL TOTAL CA: CPT | Performed by: STUDENT IN AN ORGANIZED HEALTH CARE EDUCATION/TRAINING PROGRAM

## 2024-08-30 PROCEDURE — 99231 SBSQ HOSP IP/OBS SF/LOW 25: CPT | Performed by: INTERNAL MEDICINE

## 2024-08-30 PROCEDURE — 99024 POSTOP FOLLOW-UP VISIT: CPT | Performed by: STUDENT IN AN ORGANIZED HEALTH CARE EDUCATION/TRAINING PROGRAM

## 2024-08-30 PROCEDURE — 94799 UNLISTED PULMONARY SVC/PX: CPT

## 2024-08-30 PROCEDURE — 97165 OT EVAL LOW COMPLEX 30 MIN: CPT | Performed by: OCCUPATIONAL THERAPIST

## 2024-08-30 PROCEDURE — 25010000002 BUMETANIDE PER 0.5 MG

## 2024-08-30 PROCEDURE — 74176 CT ABD & PELVIS W/O CONTRAST: CPT

## 2024-08-30 PROCEDURE — 97162 PT EVAL MOD COMPLEX 30 MIN: CPT

## 2024-08-30 PROCEDURE — 85027 COMPLETE CBC AUTOMATED: CPT | Performed by: STUDENT IN AN ORGANIZED HEALTH CARE EDUCATION/TRAINING PROGRAM

## 2024-08-30 RX ORDER — BUMETANIDE 0.25 MG/ML
0.25 INJECTION INTRAMUSCULAR; INTRAVENOUS ONCE
Status: COMPLETED | OUTPATIENT
Start: 2024-08-30 | End: 2024-08-30

## 2024-08-30 RX ORDER — BUMETANIDE 0.5 MG/1
0.5 TABLET ORAL DAILY PRN
Start: 2024-08-30 | End: 2024-11-28

## 2024-08-30 RX ORDER — BISACODYL 10 MG
10 SUPPOSITORY, RECTAL RECTAL ONCE
Status: COMPLETED | OUTPATIENT
Start: 2024-08-30 | End: 2024-08-30

## 2024-08-30 RX ADMIN — BUMETANIDE 0.25 MG: 0.25 INJECTION INTRAMUSCULAR; INTRAVENOUS at 08:22

## 2024-08-30 RX ADMIN — PANTOPRAZOLE SODIUM 40 MG: 40 TABLET, DELAYED RELEASE ORAL at 08:20

## 2024-08-30 RX ADMIN — Medication 10 ML: at 08:22

## 2024-08-30 RX ADMIN — ISOSORBIDE MONONITRATE 30 MG: 30 TABLET, EXTENDED RELEASE ORAL at 11:34

## 2024-08-30 RX ADMIN — BUDESONIDE AND FORMOTEROL FUMARATE DIHYDRATE 2 PUFF: 160; 4.5 AEROSOL RESPIRATORY (INHALATION) at 06:13

## 2024-08-30 RX ADMIN — METOPROLOL SUCCINATE 50 MG: 50 TABLET, EXTENDED RELEASE ORAL at 08:21

## 2024-08-30 RX ADMIN — ACETAMINOPHEN 650 MG: 650 SUSPENSION ORAL at 11:34

## 2024-08-30 RX ADMIN — CETIRIZINE HYDROCHLORIDE 10 MG: 10 TABLET ORAL at 08:21

## 2024-08-30 RX ADMIN — ASPIRIN 81 MG: 81 TABLET, COATED ORAL at 08:21

## 2024-08-30 RX ADMIN — BISACODYL 10 MG: 10 SUPPOSITORY RECTAL at 09:36

## 2024-08-30 RX ADMIN — SODIUM BICARBONATE 650 MG: 650 TABLET ORAL at 08:20

## 2024-08-30 RX ADMIN — ENOXAPARIN SODIUM 30 MG: 100 INJECTION SUBCUTANEOUS at 09:35

## 2024-08-30 RX ADMIN — Medication 1 TABLET: at 08:21

## 2024-08-30 RX ADMIN — ACETAMINOPHEN 650 MG: 650 SUSPENSION ORAL at 03:53

## 2024-08-30 NOTE — CASE MANAGEMENT/SOCIAL WORK
Continued Stay Note  TAWNYA Curry     Patient Name: Karoline Prater  MRN: [id number]  Today's Date: 8/30/2024    Admit Date: 8/26/2024        Discharge Plan       Row Name 08/30/24 0814       Plan    Plan Comments patient lives alone with a brother that lives nearby and assists him if needed; has RX coverage oxygen 2.5LNC continuously with [facility] and PCP; patient says he has [facility] but has not been started yet; spoke to Marcela with [facility] and they were not able to take him when he was dcd last time due to staffing issues.  independent at home prior to illness will follow for DC needs                   Discharge Codes    No documentation.                 Expected Discharge Date and Time       Expected Discharge Date Expected Discharge Time    Aug 30, 2024               ANDI Quintana

## 2024-08-30 NOTE — DISCHARGE SUMMARY
.      [facility] Medicine Services  DISCHARGE SUMMARY       Date of Admission: 8/26/2024  Date of Discharge:  8/30/2024  Primary Care Physician: Irving Alexis MD    Presenting Problem/History of Present Illness:  Shortness of breath    Final Discharge Diagnoses:  Active Hospital Problems    Diagnosis     **Acute kidney injury superimposed on CKD stage 3b     Narrow complex tachycardia     Atrial flutter     Encounter for examination for normal comparison and control in clinical research program     Chronic heart failure with preserved ejection fraction (HFpEF)     A-fib     Malignant neoplasm of upper lobe of right lung        Consults: Cardiac electrophysiology, Dr. Bowen    Procedures Performed: AVM ablation and pacemaker per Dr. Bowen 8/2 9/2024    Pertinent Test Results:   Results for orders placed during the hospital encounter of 06/29/24    Adult Stress Echo W/ Cont or Stress Agent if Necessary Per Protocol    Interpretation Summary    Overall, this is an intermediate risk test for ischemia.    No ECG evidence of myocardial ischemia. Negative clinical evidence of myocardial ischemia. Findings consistent with a normal ECG stress test.    Abnormal stress echo consistent with an intermediate risk study for myocardial ischemia.    Left ventricular systolic function is normal. Left ventricular ejection fraction appears to be 61 - 65%.    The following left ventricular wall segments are hypokinetic: apical septal and apex hypokinetic.      Imaging Results (All)       Procedure Component Value Units Date/Time    CT Abdomen Pelvis Without Contrast [[id number]] Collected: 08/30/24 0821     Updated: 08/30/24 0845    Narrative:      CT ABDOMEN PELVIS WO CONTRAST- 8/30/2024 6:58 AM     HISTORY: Possible retroperitoneal bleed; N17.9-Acute kidney failure,  unspecified; I47.19-Other supraventricular tachycardia; R79.89-Other  specified abnormal findings of blood chemistry;  J18.9-Pneumonia,  unspecified organism; I48.92-Unspecified atrial flutter; Z00.6-Encounter  for examination for normal comparison and control in clinical research  program      COMPARISON: 6/29/2024     TOTAL DOSE LENGTH PRODUCT: 296.19 mGy.cm. Automated exposure control was  also utilized to decrease patient radiation dose.     TECHNIQUE: Axial images of the abdomen and pelvis are performed without  IV or oral contrast     FINDINGS: There is mild pleural thickening chronic bibasilar  interstitial lung change. Peripheral subpleural right basilar opacities  similar to the recent PET/CT of 8/20/2024.     The nonenhanced liver, spleen, pancreas, and adrenal glands are  unremarkable. Gallbladder is absent. There is cortical renal atrophy. 6  mm nonobstructing inferior left intrarenal stone. Bilateral renal artery  calcifications. Low-density left renal lesions favoring cysts, not  completely assessed with nonenhanced imaging. No abnormal perinephric  fluid collection. Bladder is distended containing slightly homogeneous  hyperdense fluid, perhaps excreted contrast in the correct clinical  setting. No abnormal bladder wall thickening. Prostate measuring 4.9 cm  in width.     No free intraperitoneal air or loculated abscess collection. Moderate  fecal stasis. No evidence of appendicitis. Decompressed stomach.     Diffuse vascular calcifications with no abdominal aortic aneurysm. There  is no retroperitoneal hemorrhage. No pathologic abdominal or pelvic  lymphadenopathy.     Osteopenia degenerative change regional skeleton grade 1  spondylolisthesis L4/5 and L5-S1 with L5 pars defects. Chronic anterior  wedging of the T11 and T12 vertebra.       Impression:      1. No retroperitoneal hemorrhage. Minimal stranding in the right  inguinal soft tissues with no inguinal hematoma.  2. Mild bilateral pleural thickening. Right subpleural parenchymal  pulmonary opacities similar to the recent PET/CT of 8/20/2024. Leadless  cardiac  pacer device. Chronic interstitial lung disease.  3. Moderate fecal stasis. No evidence for bowel obstruction.  4. 6 mm nonobstructing inferior left intrarenal stone. Low-density left  renal lesions for which cysts are considered, incompletely assessed with  nonenhanced imaging. Slightly homogeneous hyperdense fluid contents of  the bladder may represent excreted contrast in the correct clinical  setting.           This report was signed and finalized on 8/30/2024 8:42 AM by Dr. Betsey Wells MD.       XR Chest PA & Lateral [[id number]] Collected: 08/28/24 0625     Updated: 08/28/24 0630    Narrative:      EXAMINATION: XR CHEST PA AND LATERAL-     8/28/2024 3:31 AM     HISTORY: re-eval possible pneumonia; N17.9-Acute kidney failure,  unspecified; I47.19-Other supraventricular tachycardia; R79.89-Other  specified abnormal findings of blood chemistry; J18.9-Pneumonia,  unspecified organism     2 views of the chest are compared with the previous study dated  8/26/2024.     There is moderately more progressive bilateral lung infiltrate.     There is a small right basal pleural effusion which is moderately more  progressive since the previous study.     There is no pneumothorax.     There is moderate cardiomegaly. Atheromatous change of thoracic aorta  noted.     Moderate compression deformities of the lower thoracic vertebrae are  seen. There is accentuated thoracic kyphosis.     A right internal jugular approach Mediport system is in place. No  change.       Impression:      1. A moderately more progressive bilateral lung infiltrate and right  basal pleural effusion.        This report was signed and finalized on 8/28/2024 6:27 AM by Dr. Kelly Coleman MD.       XR Chest 1 View [[id number]] Collected: 08/26/24 2203     Updated: 08/26/24 2209    Narrative:      EXAM/TECHNIQUE: XR CHEST 1 VW-     INDICATION: dyspnea     COMPARISON: 8/7/2024, 8/20/2024     FINDINGS:     Resuscitation pads overlie the chest. Cardiac  silhouette is mildly  enlarged but stable. Right chest port with catheter tip overlying the  SVC.     Redemonstrated diffuse interstitial fibrotic lung disease.  Similar-appearing right sided pleural thickening. Redemonstrated patchy  airspace opacities in the right upper and lower lung. The right upper  lobe opacity appears slightly increased. No large pleural effusion. No  visible pneumothorax.     No acute osseous finding.       Impression:         Redemonstrated patchy airspace opacities in the right upper and lower  lung, with appearance of slight worsening of the right upper lobe  opacity. No change in right-sided pleural thickening. Diffuse pulmonary  fibrosis is again noted.     This report was signed and finalized on 8/26/2024 10:06 PM by Dr. Claudio De Santiago MD.             LAB RESULTS:      Lab 08/30/24  0522 08/29/24  0403 08/28/24  0425 08/27/24  0320 08/26/24 2348 08/26/24 2129 08/26/24 2127   WBC 7.51 5.77 5.58 7.62  --   --  10.22   HEMOGLOBIN 11.3* 10.4* 9.6* 10.3*  --   --  11.1*   HEMATOCRIT 36.4* 32.8* 31.9* 33.7*  --   --  35.2*   PLATELETS 172 153 131* 133*  --   --  140   NEUTROS ABS  --   --   --  5.55  --   --  8.07*   IMMATURE GRANS (ABS)  --   --   --  0.05  --   --  0.05   LYMPHS ABS  --   --   --  1.21  --   --  1.17   MONOS ABS  --   --   --  0.74  --   --  0.83   EOS ABS  --   --   --  0.06  --   --  0.08   MCV 96.0 96.2 99.4* 99.1*  --   --  97.2*   PROCALCITONIN  --   --   --  0.33*  --   --   --    LACTATE  --   --   --   --  1.4 2.4*  --          Lab 08/30/24  0522 08/29/24  0403 08/28/24  1557 08/28/24  0425 08/27/24  0320 08/26/24 2348 08/26/24 2127   SODIUM 136 137 139 136 136  --  135*   POTASSIUM 4.6 4.4 4.6 4.5 4.8   < > 5.9*   CHLORIDE 107 107 107 111* 107  --  100   CO2 19.0* 22.0 23.0 20.0* 20.0*  --  23.0   ANION GAP 10.0 8.0 9.0 5.0 9.0  --  12.0   BUN 26* 26* 30* 36* 44*  --  51*   CREATININE 1.94* 2.12* 2.39* 2.67* 3.47*  --  3.78*   EGFR 33.9* 30.5* 26.4* 23.1*  16.9*  --  15.2*   GLUCOSE 129* 98 136* 110* 103*  --  202*   CALCIUM 9.6 9.6 9.9 9.3 8.9  --  9.7   MAGNESIUM  --   --   --  1.5*  --   --  1.6    < > = values in this interval not displayed.         Lab 08/27/24  0320 08/26/24 2127   TOTAL PROTEIN 5.8* 6.3   ALBUMIN 3.0* 3.2*   GLOBULIN 2.8 3.1   ALT (SGPT) 26 30   AST (SGOT) 25 37   BILIRUBIN 0.5 0.7   ALK PHOS 85 101   LIPASE  --  10*         Lab 08/27/24  0320 08/26/24  2348 08/26/24 2127   PROBNP 1,395.0  --   --    HSTROP T  --  117* 111*                 Brief Urine Lab Results  (Last result in the past 365 days)        Color   Clarity   Blood   Leuk Est   Nitrite   Protein   CREAT   Urine HCG        06/29/24 1028 Yellow   Clear   Negative   Negative   Negative   Negative                 Microbiology Results (last 10 days)       Procedure Component Value - Date/Time    S. Pneumo Ag Urine or CSF - Urine, Urine, Clean Catch [[id number]]  (Normal) Collected: 08/27/24 2020    Lab Status: Final result Specimen: Urine, Clean Catch Updated: 08/27/24 2049     Strep Pneumo Ag Negative    Legionella Antigen, Urine - Urine, Urine, Clean Catch [[id number]]  (Normal) Collected: 08/27/24 2020    Lab Status: Final result Specimen: Urine, Clean Catch Updated: 08/27/24 2048     LEGIONELLA ANTIGEN, URINE Negative    MRSA Screen, PCR (Inpatient) - Swab, Nares [[id number]]  (Normal) Collected: 08/27/24 2008    Lab Status: Final result Specimen: Swab from Nares Updated: 08/27/24 2133     MRSA PCR No MRSA Detected    Narrative:      The negative predictive value of this diagnostic test is high and should only be used to consider de-escalating anti-MRSA therapy. A positive result may indicate colonization with MRSA and must be correlated clinically.    Blood Culture - Blood, Arm, Right [[id number]]  (Normal) Collected: 08/26/24 2130    Lab Status: Preliminary result Specimen: Blood from Arm, Right Updated: 08/29/24 2200     Blood Culture No growth at 3 days    Blood Culture - Blood,  Arm, Left [[id number]]  (Abnormal) Collected: 08/26/24 2125    Lab Status: Final result Specimen: Blood from Arm, Left Updated: 08/30/24 0703     Blood Culture Staphylococcus, coagulase negative     Isolated from Aerobic Bottle     Blood Culture Corynebacterium species     Isolated from Anaerobic Bottle     Gram Stain Aerobic Bottle Gram positive cocci      Anaerobic Bottle Gram positive bacilli    Narrative:      Probable contaminants requires clinical correlation, susceptibility not performed unless requested by physician.      Blood culture does not meet the specified criteria for PCR identification.  If pregnant, immunocompromised, or clinical concern for meningitis, call lab to run BCID for Listeria monocytogenes.    Blood Culture ID, PCR - Blood, Arm, Left [[id number]]  (Abnormal) Collected: 08/26/24 2125    Lab Status: Final result Specimen: Blood from Arm, Left Updated: 08/27/24 1704     BCID, PCR Staph spp, not aureus or lugdunensis. Identification by BCID2 PCR.     BOTTLE TYPE Aerobic Bottle          Microbiology Results (last 10 days)       Procedure Component Value - Date/Time    S. Pneumo Ag Urine or CSF - Urine, Urine, Clean Catch [[id number]]  (Normal) Collected: 08/27/24 2020    Lab Status: Final result Specimen: Urine, Clean Catch Updated: 08/27/24 2049     Strep Pneumo Ag Negative    Legionella Antigen, Urine - Urine, Urine, Clean Catch [[id number]]  (Normal) Collected: 08/27/24 2020    Lab Status: Final result Specimen: Urine, Clean Catch Updated: 08/27/24 2048     LEGIONELLA ANTIGEN, URINE Negative    MRSA Screen, PCR (Inpatient) - Swab, Nares [[id number]]  (Normal) Collected: 08/27/24 2008    Lab Status: Final result Specimen: Swab from Nares Updated: 08/27/24 2133     MRSA PCR No MRSA Detected    Narrative:      The negative predictive value of this diagnostic test is high and should only be used to consider de-escalating anti-MRSA therapy. A positive result may indicate colonization with MRSA and  must be correlated clinically.    Blood Culture - Blood, Arm, Right [[id number]]  (Normal) Collected: 08/26/24 2130    Lab Status: Preliminary result Specimen: Blood from Arm, Right Updated: 08/29/24 2200     Blood Culture No growth at 3 days    Blood Culture - Blood, Arm, Left [[id number]]  (Abnormal) Collected: 08/26/24 2125    Lab Status: Final result Specimen: Blood from Arm, Left Updated: 08/30/24 0703     Blood Culture Staphylococcus, coagulase negative     Isolated from Aerobic Bottle     Blood Culture Corynebacterium species     Isolated from Anaerobic Bottle     Gram Stain Aerobic Bottle Gram positive cocci      Anaerobic Bottle Gram positive bacilli    Narrative:      Probable contaminants requires clinical correlation, susceptibility not performed unless requested by physician.      Blood culture does not meet the specified criteria for PCR identification.  If pregnant, immunocompromised, or clinical concern for meningitis, call lab to run BCID for Listeria monocytogenes.    Blood Culture ID, PCR - Blood, Arm, Left [[id number]]  (Abnormal) Collected: 08/26/24 2125    Lab Status: Final result Specimen: Blood from Arm, Left Updated: 08/27/24 1704     BCID, PCR Staph spp, not aureus or lugdunensis. Identification by BCID2 PCR.     BOTTLE TYPE Aerobic Bottle             Documented weights    08/26/24 2111 08/27/24 0326 08/27/24 0327   Weight: 74.8 kg (164 lb 14.5 oz) 74.8 kg (164 lb 14.5 oz) 74.8 kg (164 lb 14.5 oz)        Hospital Course:      Stacitami Prater 84-year-old white male with past medical history of GERD, blindness of his left eye, hypertension, BPH, pancreatic cancer with metastasis to the right lung currently being followed by Dr. Rico and Dr. Cool, PET scan 8/15/2024 revealed 1.3 cm spiculated nodule in the right upper lobe additional concerns for pleural-based mass and suspicion for metastatic lymphadenopathy with posterior lateral pleural thickening in the right lung due to his interstitial  disease. HFpEF, last EF 61 to 65% on previous hospitalization.  CKD stage IIIb and fibrotic lung disease.  Patient presented to [facility] emergency department on 8/26/2024 with complaints of extreme shortness of breath.  Patient states he was having difficulty ambulating back and forth to the bathroom with new onset of significant shortness of breath and he felt like his heart was racing.  Upon presentation patient was narrow complex sinus tachycardia, he was given a dose of etomidate and 1 sick shock at 100 J and converted to normal sinus rhythm.  Chest x-ray with mildly increasing markings in right upper lobe, treated for possible pneumonia with Rocephin and Zithromax.  The workup revealed an at bedtime troponin of 117 with a delta of 6, NA of 135, creatinine of 3.78, lipase of 10, procalcitonin of 0.3, WBC of 10.22 hemoglobin 11 with hematocrit of 35.2.  Patient was additionally given a 1 L normal saline bolus for undetermined reason and admitted for overnight evaluation and treatment    Acute kidney injury superimposed on CKD stage IIIb-patient's baseline creatinine is approximately 2, was discharged home on 8/7/2024 with a creatinine of 1.95.  2 patient's increasing blood pressure and improving creatinine we continued Bumex at discharge rather than his previous Lasix, he was restarted on his home losartan and allopurinol, as well as Dr. Bowen initiating Multaq.  This obviously worsened his kidney function.  Allopurinol, Multaq, and Cozaar were placed back on hold as well as Bumex.  Initiate sodium bicarbonate 650 mg 3 times daily.  Patient received approximately 6 hours of hydration yesterday, with repeat BMP 1600, revealing mild improvement of creatinine to 2.39.  This morning creatinine was below his baseline at 1.94 with a GFR of 33.9.  Will not resume home allopurinol, Cozaar or Multaq.     Narrow complex tachycardia-patient presented in unstable narrow complex sinus tachycardia, patient was given etomidate  and was cardioverted with 100 J with successful conversion to normal sinus rhythm.  Patient remains currently in normal sinus rhythm in the 70s.  Patient received AVM ablation and pacemaker 8/21/2024 per Dr. Bowen.  Morning right groin D/C/I, PPP, patient had mild to moderate back pain, CT of abdomen pelvis was done to rule out retroperitoneal bleed.  No active bleeding present.  Patient will be discharged home with instructions per Dr. Jessi Bowen in 1 to 2 weeks.    HFpEF-patient appears to be euvolemic from previous admission, immediate cessation of shortness of breath post conversion to normal sinus rhythm. Due to patient's [place] on CKD, Cozaar was discontinued at discharge.  Patient will resume metoprolol and Imdur.  Patient was instructed to change his Bumex to as needed daily for 2 pounds overnight 3 pounds in a week, new shortness of breath or increasing bilateral lower extremity edema.    Malignant neoplasm of upper lobe of right lung-patient is currently followed by Dr. Delong, pulmonology, Dr. Rico, oncology, and Dr. Cool, radiation oncology.  Most recent PET scan revealed 8/15/2024 revealed 1.3 cm spiculated nodule in the right upper lobe additional concerns for pleural-based mass and suspicion for metastatic lymphadenopathy with posterior lateral pleural thickening in the right lung due to his interstitial disease. This was siilar to finding on CR, therefore due to afebrile, lack of respiratory symptom and normal WBC, ABX discontinued and will continue to follow.  Blood cultures negative, Legionella, strep pneumonia, MRSA all additionally negative, no antibiotics warranted will continue to follow.     Today patient is resting comfortably in bed on his home 3 L of oxygen.  Patient did originally complain of significant right back pain, relieved with Tylenol.  Patient will be instructed on proper management of groin site per Dr. Bowen follow-up.  Follow-up with Dr. Bowen per his instructions.   "Additionally went over medication changes and he will be given them verbally and in discharge instructions.  Explicitly instructed patient to not resume Bumex and to use as needed.  All patient's questions were answered to the best my ability and patient verbalized understanding of plan of care and agreeable for discharge home today.      Patient has been evaluated today 08/30/24 and is stable for discharge. Patient agrees with plan to discharge on 08/30/24    Physical Exam on Discharge:  /63 (BP Location: Left arm, Patient Position: Sitting)   Pulse 56   Temp 97.4 °F (36.3 °C) (Oral)   Resp 18   Ht 162.6 cm (64\")   Wt 74.8 kg (164 lb 14.5 oz)   SpO2 95%   BMI 28.31 kg/m²   Physical Exam  Vitals and nursing note reviewed.   Constitutional:       Comments: Patient is resting comfortably in bed on his home 2 L of oxygen.  His brother is at bedside   HENT:      Nose: Nose normal. No congestion or rhinorrhea.      Mouth/Throat:      Mouth: Mucous membranes are moist.      Pharynx: Oropharynx is clear.   Eyes:      Pupils: Pupils are equal, round, and reactive to light.   Cardiovascular:      Rate and Rhythm: Normal rate and regular rhythm.      Comments: Since cardioversion patient has remained normal sinus rhythm in the 70s  Pulmonary:      Effort: No tachypnea, accessory muscle usage or respiratory distress.      Breath sounds: Examination of the right-lower field reveals rhonchi. Examination of the left-lower field reveals rhonchi.      Comments: Chronic lower lobe rhonchi  Abdominal:      General: Abdomen is flat.      Palpations: Abdomen is soft.   Genitourinary:     Comments: Voiding per urinal  Musculoskeletal:      Right lower leg: No edema.      Left lower leg: No edema.   Skin:     General: Skin is warm.      Capillary Refill: Capillary refill takes less than 2 seconds.      Coloration: Skin is pale.      Comments: Mild chronic erythema to BLE, significantly improved since previous discharge "   Neurological:      General: No focal deficit present.      Mental Status: He is oriented to person, place, and time.   Psychiatric:         Mood and Affect: Mood normal.         Behavior: Behavior normal.         Thought Content: Thought content normal.         Judgment: Judgment normal.           Condition on Discharge: Stable for discharge home with resumption of home health    [place] [facility] [place] Medications:     Discharge Medications        Changes to Medications        Instructions Start Date   bumetanide 0.5 MG tablet  Commonly known as: BUMEX  What changed:   when to take this  reasons to take this   0.5 mg, Oral, Daily PRN             Continue These Medications        Instructions Start Date   acetaminophen 500 MG tablet  Commonly known as: TYLENOL   1,000 mg, Oral, Nightly      albuterol sulfate  (90 Base) MCG/ACT inhaler  Commonly known as: PROVENTIL HFA;VENTOLIN HFA;PROAIR HFA   2 puffs, Inhalation, Every 4 Hours PRN      aspirin 81 MG EC tablet   81 mg, Oral, Daily      atorvastatin 20 MG tablet  Commonly known as: LIPITOR   20 mg, Oral, Nightly      cetirizine 10 MG tablet  Commonly known as: zyrTEC   10 mg, Oral, Daily      fluticasone 50 MCG/ACT nasal spray  Commonly known as: FLONASE   1 spray, Nasal, Daily      isosorbide mononitrate 30 MG 24 hr tablet  Commonly known as: IMDUR   30 mg, Oral, Daily, Takes before lunch       melatonin 5 MG tablet tablet   10 mg, Oral, Nightly      metoprolol succinate XL 50 MG 24 hr tablet  Commonly known as: Toprol XL   50 mg, Oral, Daily      montelukast 10 MG tablet  Commonly known as: SINGULAIR   10 mg, Oral, Nightly      multivitamin with minerals tablet tablet   1 tablet, Oral, Daily      pantoprazole 40 MG EC tablet  Commonly known as: Protonix   40 mg, Oral, Daily      sodium bicarbonate 650 MG tablet   650 mg, Oral, 3 Times Daily      Stiolto Respimat 2.5-2.5 MCG/ACT aerosol solution inhaler  Generic drug:  tiotropium bromide-olodaterol   2 puffs, Inhalation, Daily      tamsulosin 0.4 MG capsule 24 hr capsule  Commonly known as: FLOMAX   1 capsule, Oral, Nightly             Stop These Medications      allopurinol 100 MG tablet  Commonly known as: ZYLOPRIM     dronedarone 400 MG tablet  Commonly known as: MULTAQ     losartan 50 MG tablet  Commonly known as: COZAAR              Discharge Diet:   Diet Instructions       Diet: Cardiac Diets; Healthy Heart (2-3 Na+); Thin (IDDSI 0)      Discharge Diet: Cardiac Diets    Cardiac Diet: Healthy Heart (2-3 Na+)    Fluid Consistency: Thin (IDDSI 0)            Activity at Discharge:   Activity Instructions       Activity as Tolerated              Discharge Instructions:   1.  Patient is to report for medical attention for any new or worsening shortness of breath, chest pain, palpitations, or significant bilateral lower extremity edema.  2.  Patient was instructed to follow-up with PCP in 1 week.  3.  Patient was instructed to follow-up with Dr. Bowen per his instructions.  4.  Patient was instructed to follow activity restrictions per Dr. Bowen's recommendations.  5.  Patient was instructed to keep Scheduled heart failure outpatient appointment in 1 week.  6.  Patient is to keep his scheduled follow-up with pulmonology  7.  Patient's radiation oncology appointment will be rescheduled prior to discharge.  8. Patient was instructed to discontinue daily use of Bumex and to follow as needed lines and to discuss with his PCP at 1 week follow-up.  9.  Patient was instructed to discontinue his Cozaar, Multaq and allopurinol.    Follow-up Appointments:   Future Appointments   Date Time Provider Department Center   9/10/2024  1:00 PM Tarik Crocker MD [facility]   10/18/2024 11:30 AM Carlitos Bowen MD [facility]       Test Results Pending at Discharge: None    Electronically signed by MARITZA Johnson, 08/30/24, 11:43 CDT.    Time: 40 minutes.

## 2024-08-30 NOTE — PROGRESS NOTES
"EP Problems:  1.  Paroxysmal SVT  2.  Wide-complex tachycardia 2019  3.  Paroxysmal atrial fibrillation  4.  Atrial flutter, uncertain type     Cardiology Problems:  1.  Hypertension     Medical Problems:  1.  Metastatic lung cancer  2.  Pneumothorax  3.  COPD on home oxygen  4.  GERD  5.  CKD  6.  Recurrent bleeding duodenal ulcer    Patient ID:  Karoline Prater is a 84 y.o. male with problem list as above as above who EP is following for paroxysmal atrial fibrillation, atrial flutter.    Subjective:  Underwent Micra implant yesterday.  Did well with the procedure.  Having back pain this morning.    Objective:  /65 (BP Location: Right arm, Patient Position: Lying)   Pulse 87   Temp 98.1 °F (36.7 °C) (Oral)   Resp 18   Ht 162.6 cm (64\")   Wt 74.8 kg (164 lb 14.5 oz)   SpO2 93%   BMI 28.31 kg/m²     Chronically ill-appearing, no acute distress, left eye enucleated  Clear to auscultation bilaterally  Regular rate and rhythm  Warm, well-perfused  Vascular access site suture removed without evidence of swelling or hematoma, erythema, drainage    Labs today:  Lab Results   Component Value Date    GLUCOSE 129 (H) 08/30/2024    CALCIUM 9.6 08/30/2024     08/30/2024    K 4.6 08/30/2024    CO2 19.0 (L) 08/30/2024    BUN 26 (H) 08/30/2024    CREATININE 1.94 (H) 08/30/2024    EGFR 33.9 (L) 08/30/2024     Lab Results   Component Value Date    WBC 7.51 08/30/2024    HGB 11.3 (L) 08/30/2024    HCT 36.4 (L) 08/30/2024     08/30/2024     CT abdomen ordered and pending.    Telemetry reviewed: Paced rhythm    Assessment:  Atrial flutter, uncertain type  Paroxysmal atrial fibrillation  Paroxysmal SVT  Acute on chronic kidney disease    Renal function improving with maintenance of normal heart rates.  Will decrease Micra rate to 80 today.  If CT of the abdomen looks stable, okay for discharge home from EP standpoint.    Plan:  -Okay for discharge home from EP standpoint  -Continue metoprolol for now, can " consider stopping this medication if there are issues of hypotension    Part of this note may be an electronic transcription/translation of spoken language to printed text using the Dragon Dictation System.

## 2024-08-30 NOTE — PROGRESS NOTES
"Infectious Diseases Progress Note    Patient:  Karoline Prater  YOB: 1940  MRN: [id number]   Admit date: 2024   Admitting Physician: Lencho Macias*  Primary Care Physician: Irving Alexis MD    Chief Complaint/Interval History: He feels fine.  He is without new symptoms.  He has had no fevers or chills.  He has had no night sweats.  No new localizing complaints.    Intake/Output Summary (Last 24 hours) at 2024 1241  Last data filed at 2024 0848  Gross per 24 hour   Intake 360 ml   Output 1700 ml   Net -1340 ml     Allergies:   Allergies   Allergen Reactions    Penicillins Hives     Current Scheduled Medications:   [Held by provider] allopurinol, 100 mg, Oral, Daily Before Lunch  aspirin, 81 mg, Oral, Daily  atorvastatin, 20 mg, Oral, Nightly  budesonide-formoterol, 2 puff, Inhalation, BID - RT  cetirizine, 10 mg, Oral, Daily  enoxaparin, 30 mg, Subcutaneous, Daily  isosorbide mononitrate, 30 mg, Oral, Daily Before Lunch  [Held by provider] losartan, 50 mg, Oral, Daily  metoprolol succinate XL, 50 mg, Oral, Daily  montelukast, 10 mg, Oral, Nightly  multivitamin with minerals, 1 tablet, Oral, Daily  pantoprazole, 40 mg, Oral, Daily  sodium bicarbonate, 650 mg, Oral, TID  sodium chloride, 10 mL, Intravenous, Q12H  tamsulosin, 0.4 mg, Oral, Nightly          Current PRN Medications:    acetaminophen    senna-docusate sodium **AND** polyethylene glycol **AND** bisacodyl **AND** bisacodyl    melatonin    ondansetron    sodium chloride    sodium chloride    Review of Systems see HPI    Vital Signs:  Temp (24hrs), Av.2 °F (36.8 °C), Min:97.4 °F (36.3 °C), Max:98.7 °F (37.1 °C)    /58 (BP Location: Right arm, Patient Position: Lying)   Pulse 58   Temp 98.2 °F (36.8 °C) (Oral)   Resp 18   Ht 162.6 cm (64\")   Wt 74.8 kg (164 lb 14.5 oz)   SpO2 91%   BMI 28.31 kg/m²     Physical Exam  Vital signs - reviewed.  Line/IV site - No erythema, warmth, induration, or " tenderness.  Alert, pleasant, nontoxic-appearing, and in no distress  Lungs without crackles  Heart without murmur    Lab Results:  CBC:   Results from last 7 days   Lab Units 08/30/24  0522 08/29/24  0403 08/28/24  0425 08/27/24 0320 08/26/24 2127   WBC 10*3/mm3 7.51 5.77 5.58 7.62 10.22   HEMOGLOBIN g/dL 11.3* 10.4* 9.6* 10.3* 11.1*   HEMATOCRIT % 36.4* 32.8* 31.9* 33.7* 35.2*   PLATELETS 10*3/mm3 172 153 131* 133* 140     BMP:  Results from last 7 days   Lab Units 08/30/24  0522 08/29/24  0403 08/28/24  1557 08/28/24 0425 08/27/24 0320 08/26/24 2348 08/26/24 2127   SODIUM mmol/L 136 137 139 136 136  --  135*   POTASSIUM mmol/L 4.6 4.4 4.6 4.5 4.8 5.0 5.9*   CHLORIDE mmol/L 107 107 107 111* 107  --  100   CO2 mmol/L 19.0* 22.0 23.0 20.0* 20.0*  --  23.0   BUN mg/dL 26* 26* 30* 36* 44*  --  51*   CREATININE mg/dL 1.94* 2.12* 2.39* 2.67* 3.47*  --  3.78*   GLUCOSE mg/dL 129* 98 136* 110* 103*  --  202*   CALCIUM mg/dL 9.6 9.6 9.9 9.3 8.9  --  9.7   ALT (SGPT) U/L  --   --   --   --  26  --  30     Culture Results:   Blood cultures August 26, 2024:   Coagulase negative Staphylococcus from aerobic bottle  Corynebacterium species from anaerobic bottle  Blood cultures August 26, 2024-no growth    Radiology: None  Additional Studies Reviewed: None    Impression:   Positive blood cultures-2 of 4 bottles were positive.  Each bottle that was positive grew a different bacteria.  The organisms recovered from his blood cultures can be contaminants.    Recommendations:   Feel the positive blood cultures represent contaminant.  He does not manifest signs or symptoms suggestive of infection.  His white blood cell count has been normal.  Feel he can be discharged on no antibiotic treatment.  Do not feel additional workup indicated from ID standpoint at present unless he were to experience recurrent symptoms  He will follow-up with his primary care physician  I would be happy to reassess at any point if recurrent symptoms or  problems suggestive of infection  He can otherwise follow-up with me as needed  He was comfortable with that approach    Vijay Downs MD

## 2024-08-30 NOTE — PROGRESS NOTES
[facility]  Radiation Oncology Clinic   Danny Shearer MD, FACR  Fidencio Paul SALAS  _______________________________________________  [facility]  Department of Radiation Oncology  [address] [place] [id number]  Office: [phone]  Fax: [phone]    DATE: 09/03/2024  PATIENT: Karoline Prater  1940                         MEDICAL RECORD #: [id number]    1. Malignant neoplasm of upper lobe of right lung    2. History of radiation therapy    3. Former smoker                                          REASON FOR VISIT:    Chief Complaint   Patient presents with    Lung Cancer     Reason for Follow up Visit:  Karoline Prater is a very pleasant 84 y.o. patient that completed radiation to the lung and returns to the clinic today to review recent PET scan.     On presentation today he reports activity change, fatigue, SOB, wheezing, leg swelling, and frequency with urination. Denies appetite change, unexpected weight change, nasuea/vomiting, diarrhea, light-headedness, weakness, and headaches.     History of Present Illness:  Diagnosed with two PET+ right lung nodules. He completed 5000 cGy in 5 fractions to the right upper and right lower lobes of the lung on 02/07/2024.    09/12/2018 - CT Chest due to weight loss:  New lobulated right upper lobe mass 5.7 cm that extended to the back hilum  Numerous mediastinal lymph nodes ranging in size from mm to 1.3 cm and  subcarinal lymph node that measured 1.5 x 1.5 x 3.5 cm    09/12/2018 - CT Abdomen/Pelvis:  4.9 x 4 x 4 cm soft tissue mass with peripheral calcification immediately adjacent to the gallbladder in the head of the pancreas area    10/11/2018 - CT Chest without contrast:  5.7 x 3.2 cm mass in the right upper lobe is suspicious for primary pulmonary neoplasm, with apparent extension into the right upper lobe bronchus that supplies this portion of the lung. After discussion with   Leny, the CT-guided biopsy scheduled for today was canceled in favor of bronchoscopy and biopsy. The patient understands that if the bronchoscopy and biopsy are nondiagnostic, CT-guided biopsy may still be necessary.  COPD, with mild interstitial disease, numerous small subpleural nodules are seen in the lung bases, with an average size of 2 to 3 mm.  Evidence of healed granulomatous disease.  Small nonobstructing right-sided nephrolithiasis measuring 4 mm.  Small coarse calcification in the posterior pancreatic head which may be related to chronic pancreatitis.    10/11/2018 - MRI Abdomen with and without contrast:  4.1 x 3.4 cm soft tissue mass in the subhepatic space, abutting the second portion the duodenum with mild displacement of the duodenum. The mass is probably separate from the duodenum. The mass shows mild enhancement and also contains some coarse calcifications.   There is no involvement of the pancreas and the pancreas appears within normal limits. Differential possibilities include a desmoid tumor, less likely leiomyoma of the wall of the duodenum or malignancy. Consider further evaluation with endoscopic ultrasound and biopsy of this mass.    10/23/2018 - Chest x-ray:  No significant change in the RIGHT upper lobe mass.  There is a 2.2 cm nodule overlying the LEFT lower chest that is likely within the soft tissues. This can be followed on subsequent exams.    10/24/2018 -  Bronchoscopy with biopsy:  Station 7 lymph node, endobronchial ultrasound guided fine needle aspiration, smears (6), ThinPrep preparation (1) and cell block.  Background small, mature lymphocytes.  Clusters of histiocytes suggestive of granulomata.  A few bronchial epithelial cells.  Negative for malignant cells.  Kinyoun and GMS stains are negative for acid-fast bacilli and fungal organisms, respectively.  Bronchial washings, smears (4) and cell block:  Blood, bronchial epithelial cells and a few histiocytes.  Negative for  malignant cells.  Kinyoun and GMS stains are negative for acid-fast bacilli and fungal organisms, respectively.    11/27/2018 - Bronchoscopy/EBUS with biopsy per :  Lung mass, right upper lobe, FNA aspiration smear and cell block:  adenocarcinoma. See comment  Lung, right upper lobe, bronchoalveolar lavage:  adenocarcinoma  Lymph node, 10L, FNA:  bronchial epithelial cells, proteinaceous debris and rare inflammatory cells, no malignant cells identified  Lymph node, 4L, FNA:  lymphocytes and bronchial epithelial cells; no malignant cells identified  Lymph node, station 7, FNA:  lymphocytes and bronchial epithelial cells, no malignant cells identified  Lymph node, 4R, FNA:  lymphocytes and bronchial epithelial cells, no malignant cells identified  Lymph node, 10R, FNA:  bronchial epithelial cells, blood with acute inflammatory; no definite lymphocytes or malignant cells identified  Lung mass, right upper lobe, transbronchial biopsies:  moderate to poorly differentiated adenocarcinoma, consistent with a lung primary    11/27/2018 - PET Scan:  Large FDG avid right upper lobe pulmonary mass consistent with malignancy.  This could be a primary lung malignancy or a metastasis.  Hypermetabolic normal-sized to mildly enlarged mediastinal lymph nodes and bilateral pulmonary hilar lymph nodes. The level of uptake within these lymph nodes suggests neoplasm. Reactive lymphadenopathy would be less likely.  Hypermetabolic irregular right internal mammary nodule suspect for a metastasis.  Upper abdominal mass contiguous with the duodenum showing marked   FDG uptake consistent with neoplasm. This mass contains a few coarse calcifications. These could be dystrophic calcifications. Calcifications can also be seen in some mucinous tumors. Carcinoid tumors may also calcify, although the scan does not show the perilesional stranding often associated with carcinoid, and the level of FDG uptake is higher in this case than  typically seen with carcinoid. Differential diagnosis includes metastasis, lymphoma, and primary neoplasm of small bowel.  There is a focus of increased FDG uptake along a small bowel loop in the anterior midabdomen. The unehanced transmission CT images suggest mild   soft tissue thickening of the wall in this region. Possible neoplastic nodule versus artifact.  A dedicated, contrast-enhanced CT scan of the chest, abdomen, and pelvis, and also utilizing orally administered contrast material, is suggested for further evaluation.    02/18/2019 - CT Chest with contrast:  New left hydronephrosis with decreased enhancement of the left kidney compared to the right, this is incompletely visualized and characterized. Recommend CT abdomen and pelvis.  Increased size of subhepatic mass adjacent to the descending duodenum which measures 4.3 x 9.0 cm, previously 4.1 x 3.4 cm on 10/11/2018 abdomen MRI. This is concerning for malignancy.  There is a 0.8 cm nodular opacity within the trachea, which could represent adherent mucus or mass. Recommend direct visualization or attention on follow-up.  Slightly decreased size of right upper lobe mass measuring 4.9 x 3.5 cm, previously 6.1 x 3.6 cm. Similar mediastinal and paraesophageal lymphadenopathy.   Similar pneumobilia.    02/20/2019 - CT Abdomen/Pelvis without contrast:  Mild left-sided hydronephrosis likely related to obstruction of the proximal left ureter due to a lobular 3.5 cm mass medial to the left renal hilum, 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 Retention of contrast within the left renal cortex in patchy distribution may represent ATN. There is increased attenuation of urine within the dilated left renal collecting system probably due to delayed excretion of contrast.  Compared with the previous MRI from October, 2018 there is heart increase in size of the large subhepatic mass, currently measuring 8.9 x 5.4 cm, compared with 4.1 x 3.4 cm previous. This is concerning for neoplasm  also.    04/02/2019 - PET Scan:  Abnormal PET/CT, with a lobular hypermetabolic mass in the right lung, upper lobe, measuring approximately 5.2 x 3.5 cm, with extension to the anterior right hilum. This has a maximum SUV of 10.1 is compatible with malignancy. There is evidence of mediastinal and hilar lymphadenopathy, with low-level metabolic activity.  Interval increase in size of a large mass in the right upper abdomen, inseparable from the duodenum, measuring approximately 10.7 x 6.9 cm, compared with 5.4 x 8.9 cm on the CT from 02/20/2019. This mass has a maximum SUV of 23.6.  Slight increase in 2 small inseparable masses medial to the hilum of the left kidney, measuring 2.2 and 2.6 cm and the other measuring 3.9 cm, the maximum SUV of 18.6. There is associated mild left sided hydronephrosis probably due to obstruction of the proximal left ureter.    04/18/2019 - Chemotherapy course:  Palliative Carbo/Alimta/Keytruda (to cover pancreatic mass as well)    04/26/2019 - CT Abdomen/Pelvis with contrast:  Interval decrease in size of the duodenal or letty hepatis mass.  Interval decrease in size of the obstructive mass in the LEFT renal hilum. This now measures approximately 2.9 x 2.1 cm.  No new disease.  Urinary bladder distention and hydroureter is likely due to outlet obstruction.    06/27/2019 - CT Chest without contrast:  Continued diminishment in size of a primary lung neoplasm within the right upper lobe. Overall dimensions of the lesion have decreased as well as a diminishing soft tissue component with increased tumor necrosis. No new lesions are present.  Essentially stable mediastinal and paraesophageal lymphadenopathy. One of the AP window nodes shows slight interval increase in size but only by a few millimeters. No new adenopathy is demonstrated. No convincing evidence of osseous metastases to the bony thorax.  Pneumobilia.  Coronary calcifications with mild cardiomegaly. No evidence of pericardial  effusion.  Borderline dilatation of the ascending thoracic aorta..    06/27/2019 - CT Abdomen/Pelvis without contrast:  Previously identified periduodenal/subhepatic space soft tissue mass appears resolved.   Previously identified mass at the left renal pelvis appears significantly decreased. Difficult to say if it is completely resolved on this noncontrast exam. There does appear to be a residual mild left caliectasis.  No new mass lesion identified in the abdomen or pelvis. No suspicious adenopathy.    08/10/2019 - CT Chest without contrast:  Increased soft tissue component in the known right upper lobe mass.  Increase/more conspicuous interlobular septal thickening, with no new discrete left-sided mass or nodule identified on today's examination.  Similar mediastinal lymphadenopathy.    10/17/2019 - CT Chest without contrast:  Spiculated lesion in the right upper lobe laterally is not significantly changed. Size measurements are listed above.  The patient has moderately severe pulmonary fibrotic changes that appears to be worsening.  There are couple of new areas of nodularity peripherally on the left side likely representing coalescing areas of fibrosis. New pulmonary nodules are felt to be less likely. There is a 6 mm area in the left upper lobe on image 65 of series 3 and a 1.3 cm area in the extreme left lung base on image 113 of series 3.  Atheromatous disease of the thoracic aorta and coronary arteries with cardiomegaly. The pulmonary arteries are dilated.  Small scattered mediastinal lymph nodes appear stable.      10/17/2019 - CT Abdomen/Pelvis without contrast:  Atheromatous calcification of the aortoiliac vessels and coronary arteries. Cardiomegaly.  Prior cholecystectomy. Air in the biliary tree likely related to previous sphincterotomy.  No CT evidence of metastatic disease.  Hyperdense and hypodense renal lesions bilaterally appear relatively stable. These are probably a combination of simple cysts  and hemorrhagic cysts. These lesions are not fully characterized without contrast administration. They all appear relatively stable in size compared to the previous exam. Stranding of the fat around the kidneys is seen suggesting chronic inflammation. This is also present previously.  Enlarged prostate measuring 4.9 cm. Bladder wall thickening is likely associated with hypertrophy of the muscle. Acute and chronic inflammation are possible. Neoplasm less likely.  Small fat-containing inguinal hernias.  Probable chondroid lesion in the right femoral head/neck junction, stable. Probable enchondroma. Degenerative changes of the spine. Bilateral pars defects at L5 with grade 1 spondylolisthesis.    01/09/2020 - CT Chest without contrast:  Stable spiculated right upper lobe nodule/mass with similar mediastinal lymphadenopathy. Questionable right hilar adenopathy with diminishes assessment due to the lack of IV contrast secondary to diminished renal function.  Advanced emphysema as well as severe pulmonary fibrosis.  No new pulmonary nodules.  Cardiomegaly. Moderate vascular calcifications with involvement of coronary arteries.    01/09/2020 - CT Abdomen/Pelvis without contrast:  Mildly prominent aortocaval retroperitoneal lymph nodes position just below the level of the renal vein worrisome for potential lymphatic metastasis. These are very similar to 10/17/2019 but have slightly increased in size compared back to 04/26/2019.  Pneumobilia. No discrete suspicious focal liver lesion.  Apparent wall thickening of the duodenum. Duodenal pathology considered on (infectious/inflammatory or neoplastic).  Similar scattered renal lesions favorable for hyperintense and simple renal cysts but not completely assessed with nonenhanced imaging. No hydronephrosis.  Borderline prominent prostate. Fatty containing inguinal hernias.  Stable chondroid based lesion of the right femoral neck. Similar grade 1 L5  spondylolisthesis.    07/16/2020 - CT Abdomen/Pelvis without contrast:  A retroperitoneal lymph node within the aortocaval space has decreased slightly in size compared with 6 months ago.  No new abnormality is seen.    11/12/2020 - CT Chest without contrast:  Mixed response therapy with interval decrease in size in the RIGHT upper lobe pulmonary nodule but increase in size and mediastinal lymph nodes.  In addition, there is severe emphysema with findings of severe pulmonary fibrosis. Interval worsening of interstitial opacities bilaterally as well as suggestion of some pleural nodularity. Lymphangitic spread of malignancy should be considered.  Small pleural effusion on the RIGHT is new.    11/12/2020 - CT Abdomen/Pelvis without contrast:  The aortic caval node described on the comparison study is not significantly changed in size when measured at the same location.  Nonobstructing renal calculus on the LEFT. No change in the indeterminate renal lesions on the LEFT, likely cysts.  There is nonspecific free fluid in the pelvis, new since the prior study and there is some mild nonspecific mesenteric haziness.    03/17/2021 - CT Chest without contrast:  Decrease in size of right upper lobe mass. This was 22 x 47 mm. This is now 20 x 33 mm.  Severe emphysematous change with extensive fibrotic change throughout the right and left lung.  Small bilateral basilar pneumothoraces  Coronary and aortic vascular calcification.    03/17/2021 - CT Abdomen/Pelvis without contrast:  Nonobstructing calculus lower pole left kidney  Low-density lesions associated with the left renal contour probably proteinaceous cyst these are unchanged  Chronic right lateral pleural complex in the lung base with bilateral small basilar pneumothoraces..     08/25/2021 - CT Chest without contrast:  Small right pneumothorax.  Redemonstration right upper lobe spiculated mass measuring 3.0 x 2.1 cm, previously 3.3 x 2.0 cm on 3/17/2021.  Similar right  upper lobe 0.6 cm spiculated pulmonary nodule compared to 11/12/2020.  Emphysematous and fibrotic lung changes, similar to prior.  Similar mildly enlarged mediastinal lymph nodes.  Borderline heart size with coronary artery calcifications versus stent.    11/15/2021 - CT Chest without contrast:  There is a spiculated mass in the right upper lobe as before, this appears unchanged, measuring approximately 3.0 x 2.2 cm. This has bands of soft tissue extend to the lateral pleura there is pleural thickening, also stable. There is a stable 4 mm stellate shaped nodule in the right upper lobe. No new pulmonary nodule is identified.  No measurable mediastinal or hilar lymphadenopathy on this limited noncontrast study.  Slight increase in volume of loculated pleural fluid in the right lung base, with increasing pleural thickening at the anterior margin of the right inferior hemithorax. This may be related to radiation change.  Small pericardial effusion. Extensive atherosclerotic disease of the coronary arteries and thoracic aorta. 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    11/15/2021 - CT Abdomen/Pelvis without contrast:  Stable exam with no evidence of intra-abdominal or pelvic metastatic disease.  Cysts are noted within the left kidney as before, there is a stable 10 mm exophytic probable atelectasis at the inferior pole the left kidney. There is a 7 mm nonobstructing nephrolithiasis at the inferior pole the left kidney.  Extensive interstitial fibrosis in the lung bases with loculated right basilar pleural fluid and pleural thickening, described in more detail in a separate chest CT report today.  Heavy calcified atherosclerotic plaque is noted within the abdominal aorta and iliac arteries with normal aortic caliber.  Stable bilateral fat-containing inguinal hernias.  Probable right-sided constipation without evidence of bowel obstruction.    11/16/2022 - CT Chest without  contrast:  Enlarging 9 mm spiculated RIGHT upper lobe pulmonary nodule. This is concerning for a primary lung neoplasm. Differential diagnosis also includes an indolent infectious or inflammatory process. Consider further evaluation with PET/CT.   No change in 3 cm spiculated mass in the RIGHT upper lobe abutting the major and minor fissure.  Emphysema and pulmonary fibrosis, similar to prior exams.    11/16/2022 - CT Abdomen/Pelvis without contrast:  Interstitial fibrosis within the lung bases. There is a loculated appearing effusion within the right lateral costophrenic angle.  Pneumobilia as well as a small amount of contrast within the more central biliary tree. This is felt to be related to a biliary diversion with what appears to be a pulled up bowel loop within the right upper quadrant. The gallbladder is surgically absent. No discrete hepatic mass is identified  Nonobstructing left-sided nephrolithiasis. There are cysts of the left kidney. There are 2 lesions which are more indeterminate but which are stable from the previous exam involving the posterior mid pole and lower pole of the left kidney. These could be followed up on subsequent imaging. No evidence of ureteral stone or obstructive uropathy.  Mild constipation. There is no obstruction or free air. The small bowel is normal distribution and appearance.  Atheromatous calcification ectasia the abdominal aorta and iliac arteries.  Small fat-containing inguinal hernias. The prostate gland is mildly enlarged. Urinary bladder is normal in appearance.  Listhesis at L4-L5 and L5-S1 as described above.  Overall stable appearance from the previous exam. No radiographic evidence of metastatic disease to the abdomen or pelvis    01/05/2023 - CT Abdomen/pelvis without contrast:  Mild constipation. Otherwise normal bowel gas pattern.  Right-sided pleural effusion which appears loculated with associated pleural thickening. The pleural thickening is somewhat nodular  in appearance and if the patient has a prior history of lung neoplasm could potentially represent pleural-based studding. This shows some progression from the previous examination. The effusion shows slight interval increase in size.  There is interstitial fibrosis within both lung bases.  Mild aneurysmal dilatation of the ascending thoracic aorta. Heavy coronary calcifications are present. There is no pericardial effusion.  Cortical cysts of both kidneys. There are again 2 lesions within the left kidney which are more indeterminate but which are stable from the previous exam. There is nonobstructing left-sided nephrolithiasis. No evidence of ureteral stone or obstructive uropathy. No free fluid or localized inflammatory process is demonstrated.  Small bilateral fat-containing inguinal hernias. The prostate gland is enlarged.  Listhesis at the L4-L5 and L5-S1 level..     11/08/2023 - CT Abdomen/Pelvis without contrast:  Pneumobilia, increased from prior.  This may be seen following sphincterotomy or other hepatobiliary procedures.  Recommend correlation with operative history and hepatic enzyme levels.  No portal venous gas or bowel pneumatosis.   Nonobstructing left nephrolithiasis and unchanged left renal cysts.   Moderate quantity of stool throughout the colon.     11/08/2023 - CT Chest without contrast:  Significant increase in size of an anterior right apical nodule now measuring 2.6 x 1.9 cm, compared to 0.9 cm previously, highly suspicious for enlarging neoplasm.   Stable 3.6 x 1.9 cm spiculated right upper lobe mass.   Increased opacity along the anterior minor fissure which is nonspecific.   Stable fibrosis and emphysema.   Stable enlarged, partially calcified mediastinal lymph nodes.     12/06/2023 - PET Scan:  The PET CT examination demonstrates a very high level of activity in the  2.6 x 1.90 cm anterior right apical nodular consolidation seen on the patient's recent CT exam.  The SUV max of 14.8 is  consistent with malignancy until proven otherwise.  The 3.6 x 1.9 cm spiculated nodule seen in the right upper lobe below this with SUV max of 5.34 is also consistent with an increased likelihood of malignancy.  There is modest increased activity in nodes in the right hilum and mediastinum, one or more of which may be metastatic.  The examination otherwise demonstrates a generally physiologic distribution of activity elsewhere in the thorax, as described in the report.   The examination demonstrates a generally physiologic distribution of activity in the abdomen and pelvis, as described in the report.   The examination demonstrates a generally physiologic distribution of activity in the included portions of the head and neck, as described in the report.   The examination demonstrates a generally physiologic distribution of activity in the included portions of the skeleton and extremeties, as described in the report.     12/13/2023 - Appointment with Dr. Michele:  ASSESSMENT AND PLAN:  Karoline Prater is a 83 y.o.  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   CKD and chemotherapy associated ANEMIA, previously on Procrit, currently on hold having completed chemotherapy with further improvement in hemoglobin.  Pancreatic mass diagnosed 10/29/03 negative on open biopsy  BPH on Flomax  Orthostatic hypotension medications managed by Dr. Reuben Vásquez completed 4 cycles of combination chemotherapy with carboplatin, Keytruda, Alimta on 7/5/2019.   Maintenance Keytruda and Alimta every 3 weeks was delivered.   The final cycle number #25 of Keytruda/Alimta was delivered on 10/29/2020.  Treatment was held since that time due to issues of dyspnea and shortness of breath, requiring steroids.   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  Imaging studies with CT scan of the chest on 11/9/2023 and a follow-up PET scan on 12/7/2023 documented new findings.  Fahad is here for evaluation and recommendations.  ACTIVE or ASSOCIATED PROBLEMS:  Physical examination today,12/13/2023:   No evidence of palpable peripheral lymphadenopathy.  Chronic rales bilaterally are noted in upper and lower lung fields anteriorly and posteriorly.  Heart is regular normal S1 and S2, no tachycardia.  Abdominal exam is benign.  Neurological exam is intact with intact mental status and nonfocal neurological exam. Brief cranial nerves exam are all intact.  CBC today 12/12/2023 reveaed a WBC of 7.63 with a hemoglobin of 13.9 and and a platelet count of 143,000  Examination is completely unremarkable for new problems.  He continues to use oxygen however sparingly and only in the evenings.  He is now driving himself back and forth to the doctor's office and wherever he needs to go.  Mr. Prater requests that imaging studies only be done yearly.   CT CHEST WO on 11/9/2023 at [facility], compared to 11/16/22  multiple mediastinal lymph nodes, most of which are partially calcified which appear similar in size and number since the prior study. The largest measure up to 1.3 cm short axis subcarinal region and 1.2 cm short axis AP window  2.6 x 1.9 cm anterior right apical nodular consolidation, significantly increased in size, previously 0.9 cm.  3.6 x 1.9 cm Spiculated nodule right upper lobe, unchanged in size  1.2 x 1.1 cm additional perifissural opacity along the anterior minor fissure   Emphysema, bronchial thickening and peripheral reticular opacities present throughout both lungs. Stable emphysema.  Trace amount of pleural fluid lateral right lung base  Degenerative changes present in the spine. No discrete osseous lesion detected  CT ABDOMEN PELVIS WO on 11/9/23 at [facility], compared to  1/5/23  Pneumobilia, increased from prior. This may be seen following sphincterotomy or other hepatobiliary procedures. Recommend correlation with operative history and hepatic enzyme levels. No portal venous gas or bowel pneumatosis  Nonobstructing let nephrolithiasis and unchanged left renal cysts  Moderate quantity of stool throughout the colon  Although he is clinically and symptomatically stable without new significant changes, imaging studies showed suspicion of new or progressive disease in the chest.  I recommended a PET scan.  PET CT SKULL BASE TO MID THIGH on 12/13/23, compared to CT 11/08/23  2.6 x 1.9 cm anterior right apical nodular consolidation, SUV 14.8, consistent with malignancy until proven otherwise  3.6 x 1.9 cm RUL spiculated nodule, SUV 5.34, consistent with increased likelihood of malignancy  No additional significantly FDG avid nodules are seen within the lung fields   Right hilar lymph node SUV 3.90  Right suprahilar lymph node SUV 4.26  Enlarged subcarinal node SUV 2.58  Nonenlarged precarinal node SUV 2.26  AP window node SUV 2.82  Additional smaller/less FDG avid nodes not individually described  In the head and neck, the included portions of the orbits and paranasal sinuses demonstrate normal levels of activity   The examination demonstrates a generally physiologic distribution of activity in the abdomen and pelvis   The examination demonstrates a generally physiologic distribution of activity in the included portions of the skeleton and extremeties   It appears as if Mr. Prater has recurrent oligometastatic disease limited to the right upper lobe chest area.  He did not receive consolidation XRT due to pulmonary fibrosis in the past.  He does not desire to receive further systemic therapy.  He is very tearful in the clinic today and I had a long conference with him regarding life in general as well as continued palliation with symptom management as well as the potential to halt the  growth of the limited nodules in the RUL of the lung.  He lives alone, his brother who is a couple of years older than him he is very very active and interacts with him periodically. He has a son that lives in [place] that he does not see very often. Mr. Prtaer appears somewhat lonely today and he is encouraged strongly in the clinic today  I believe he would benefit from SBRT to the lung lesions  Plan:  Referral is made to Dr. Danny Cool for SBRT to the abnormal lung lesions  I will see Mr. Prater back in follow-up in 2 months    12/21/2023 - Consult with :  Following this discussion and in consideration of the diagnostic data/evaluation of the patient, I recommended a course of stereotactic radiosurgery to two right lung nodules. Will simulate treatment fields today, 3D CT with MIPS to begin the planning process, final course pending.  Continue ongoing management per primary care physician and other specialists.   Plan:  Plan on 4-5 pinpoint treatments     01/24/2024 - 02/07/2024 - Completed radiation course:  Received 5000 cGy in 5 fractions to the right upper and right lower lobes of the lung via Stereotactic Radiation Therapy - SRT.     03/20/2024 - CT Chest without contrast:  Compared with 11/08/2023.   Small decrease in the size of the right apical mass with   dimensions as described.    Minimal, if any change in the additional spiculated soft tissue right upper lobe mass. Findings are superimposed upon fibrosis and emphysema. If further evaluation is needed then a PET / CT scan would be suggested.     03/20/2024 - CT Abdomen/Pelvis without contrast:  No evidence of metastasis in the abdomen/pelvis.     04/03/2024 - Appointment with :  ASSESSMENT AND PLAN:  He was referred to Dr. Danny oCol at [facility] for SBRT.  SBRT by Dr. Danny Cool was delivered in 5 treatment fractions for a total dose of 5000 cGy. XRT was initiated 1/24/2024 and was completed on 2/7/2024  CT CHEST WO CONTRAST AT [facility] on  3/20/2024: Compared to 11/08/2023  1.3 cm partially calcified subcarinal lymph node, unchanged.   2.0 x 1.5 x 1.9 cm right upper lobe mass, previously 2.0 x 2.5 x 1.9 cm   1.8 x 3.0 x 2.0 right upper lobe mass, previously 3.5 x 1.9 x 1.6 cm   Emphysematous changes with subpleural reticulations and possible honeycombing and fibrosis are noted with pleural thickening again observed along the right lateral chest wall extending to the diaphragmatic surface.   There are scattered subpleural calcifications   CT ABDOMEN PELVIS WO CONTRAST AT [facility] on 3/20/2024: compared to 11/08/2023  No evidence of metastasis in the abdomen/pelvis.   Multiple left renal cysts. Nonobstructing 4 mm stone at the left lower pole   Mild colonic diverticulosis   No aggressive osseous lesion identified   Multilevel degenerative spondylosis and mild dextroconvex scoliosis. Grade 1 anterolisthesis at L4-L5 and L5-S1. Right unilateral L5 pars defect.  I was able to go over all of the previous treatments that Mr. Prater has had. I also went over his review of systems and was happy to be able to inform him that knee imaging studies performed 9 3/20/2024 were stable to improved. Many issues associated with his overall clinical condition and comorbid associated medical problems secondary to treatment were also discussed. Plan is as outlined below.  Plan:  A chest x-ray is ordered to be done at his return  CBC and CMP are ordered today  Port is flushed today and will be flushed in 6 weeks  I will see Mr. Prater back in follow-up in 3 months  Return in about 3 months (around 7/3/2024).    05/06/2024 - CT Chest without contrast:  A 4 x 2.2 cm spiculated right upper lobe lesion laterally previously measures about 3.5 x 2.2 cm. Therefore, it may be slightly larger in size.  A previously described 9 mm spiculated nodule in the right upper lobe more superiorly is now surrounded by consolidation. I suspect this represents posttreatment change. Correlate  clinically.  Emphysema and pulmonary fibrosis.  Atheromatous calcification of the thoracic aorta and coronary arteries. Ectasia of the ascending aorta measuring 3.8 cm. Dilated main pulmonary artery segment measuring 3.5 cm. Cardiomegaly.    05/09/2024 - Appointment with :  Return in 3 months with a CT chest before  will defer to medical oncology for imaging orders at [facility].     06/29/2024 - CT Chest without contrast:  A spiculated mass in the right upper lobe laterally is larger on the current study.  There is consolidation around a previously described nodule in the right lung apex likely related to posttreatment change.  Diffuse reticulonodular fibrotic changes throughout both lungs. Emphysema.  Pleural thickening on the right that is more focal in the right lung base laterally. Metastatic pleural disease is considered.  Ectasia of the ascending thoracic aorta measuring 4.3 cm. Main pulmonary artery segment is dilated measuring 3.6 cm. Mild cardiomegaly.  Mediastinal lymph nodes appear stable. Multiple lymph nodes are calcified.    06/29/2024 - CT Abdomen/Pelvis without contrast:  There is a mildly dilated proximal small bowel loop measuring 4 cm. No other small bowel distention is seen. This could be transient or due to partial obstruction. Gastric wall thickening likely related to under distention. There is under distention of portions of the colon and moderate stool in the colon.  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 Fibrosis in the lung bases with emphysema.  Left renal cyst. A 6 mm nonobstructive renal stone lower pole on the left.  Mildly enlarged prostate measuring 4.7 cm.  Air within the biliary tree likely related to prior sphincterotomy. Prior cholecystectomy.  Coarsening of the trabecula in the right femoral neck and trochanteric region could be related to early Paget's disease. There is no cortical thickening. A lipo sclerosing myxofibrous tumor is felt to be  less likely as there is no sclerotic margin.    07/03/2024 - Documentation per :  I have reviewed the CT scan of the thorax on this admission for atrial fibrillation with rapid ventricular response.  He completed recent SBRT radiotherapy for 2 right lung nodules on February 7, 2024.  Review of the current CT scan appears consistent with postradiation change.  We cannot entirely rule out progression however this appears unlikely with the chronology and radiographic appearance.  He is scheduled to return to our department in approximately 1 month with follow-up CT scan of the thorax and we will follow this closely with serial CT scans of the thorax.    08/02/2024 - Presents to ER with complaints of SOB. He was admitted for further evaluation.     08/03/2024 - Inpatient consult with :  CT chest without contrast  Continue to monitor cytopenias  Continue current supportive care  Palliative care consultation  Consider pulmonary consultation    08/03/2024 - Inpatient with Tarik Crocker MD:  Recommend/plan:   Patient has shortness of breath at the time of presentation and he has known history of COPD and pulmonary fibrosis but no pulmonary follow-up was done in the last few years after his last inpatient visit with Dr. Becerra 5 years ago  He was using only using albuterol rescue inhaler at home and was not on any long-term inhaler no recent PFT was available.  I have reviewed the recent imaging studies.  Patient with extensive pulmonary fibrosis COPD   His pulmonary fibrosis does not appear to be UIP but more diffuse and it could be to some extent contributed by radiation treatment for the lung cancer.  Patient already been treated with the chemotherapy and received Keytruda and completed radiation treatment and followed by oncology and radiation oncology.  His current presentation is more consistent with a COPD exacerbation.  CT scan of the chest showed mass on the right side and fibrotic changes but  no definite consolidation was noted.  His white cell count is normal platelets are normal.  No antibiotics started  I started the patient on Symbicort and DuoNeb and also will start the patient on Solu-Medrol  Continue oxygen.  Currently on 2.5 L that is his baseline.  Use Vapotherm or BiPAP if needed  Plan for outpatient pulmonary follow-up with a PFT and possible sleep study when more stable.  Added fluticasone and Zyrtec for nasal allergy and he is already on Singulair.  There is no immediate plan for starting back on Keytruda radiation treatment.  Oncology following.    Monitor renal function carefully.  He may be at his baseline but would benefit from nephrology consult  Continue DVT and stress ulcer prophylaxis.  On heparin on Protonix.  Continue support.  PT OT.  He needs to address his CODE STATUS because oncologist suggested palliative care  Patient have repeat labs imaging studies from time to time.  CODE STATUS: Full.  Overall prognosis: Guarded.  We appreciate the consult and we will follow    08/03/2024 - CT chest without contrast:  Stable spiculated nodule within the medial RIGHT apex.  Masslike focus within the base of the RIGHT upper lobe shows increased size compared with 5 weeks ago.  Diffuse interstitial and reticulonodular disease within both lungs is more prominent now as compared with 5 weeks ago.    08/15/2024 - Appointment with :  We discussed that again this right upper lobe mass has again. I will order a PET scan for further evaluation of this suspicious area. He will return in 2 weeks for a follow up. I have instructed him to continue to see the other health care providers as per their scheduling.  Plan:  PET scan ordered, they will call you  return in 2 weeks    08/20/2024 - PET Scan:  Abnormal PET/CT, there is FDG avidity associated with the spiculated pleural-based nodule in the medial right lung apex with a maximum SUV of 4.6, there is also a spiculated nodule in the right  upper lobe anteriorly that measures 1.3 cm, with a maximum SUV of 3.9, which is suspicious for active neoplasm.   Concerning the pleural-based mass in the right upper lobe, this may be related to an area of resolving pneumonia given the interval decrease in size and SUV of 4.5 max.   Hypermetabolic subcarinal lymphadenopathy is present with an SUV of 5.4. This is suspicious for metastatic lymphadenopathy.   Diffuse low-level activity associated with posterior lateral pleural thickening in the right lung base, with extensive interstitial disease.    08/26/2024 - 08/30/2024 - Hospital admission due to complaints of dyspnea that is worse with exertion. Discharged with follow up appointments.     08/29/2024 - AVM ablation and pacemaker per Dr. Bowen.    History obtained from PATIENT and CHART    PAST MEDICAL HISTORY  Past Medical History:   Diagnosis Date    Arthritis     Atrial fibrillation with rapid ventricular response 05/31/2019    Blindness of left eye     BPH with obstruction/lower urinary tract symptoms     Cancer     stomach & lung    CHF (congestive heart failure)     CKD (chronic kidney disease) stage 3, GFR 30-59 ml/min     COPD with acute exacerbation 8/3/2024    Coronary artery disease     Elevated cholesterol     Essential hypertension 10/02/2017    Fibrotic lung diseases     GERD (gastroesophageal reflux disease)     Hearing loss     History of transfusion     Hydronephrosis of left kidney     Hyperlipidemia     Hypertension     pt was taken off of all of his medications for BP (atenolol, lisinopril, lasix) because his BP kept bottoming out so his primary dr told him to discontinue them 1-2 months ago (Jan/Feb 2019). pt states he takes no medications currently.    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 Patel is just going to keep an eye on it and  not do surgery right now.    Mediastinal adenopathy 10/24/2018    Station [facility]    Monoclonal gammopathy of unknown significance (MGUS) 09/11/2018    Pancreatic mass     pt states he had this in 2013 but it went away on its own. Now recent CT shows it has come back so he is going to have an ultrasound on 3/13/19.    Shortness of breath       PAST SURGICAL HISTORY  Past Surgical History:   Procedure Laterality Date    ABDOMINAL SURGERY      BRONCHOSCOPY N/A 10/24/2018    Procedure: BRONCHOSCOPY WITH BIOPSY, EBUS;  Surgeon: Gareth Becerra MD;  Location: [facility] OR;  Service: Pulmonary    CHOLECYSTECTOMY      COLONOSCOPY N/A 1/3/2019    Procedure: COLONOSCOPY WITH ANESTHESIA;  Surgeon: Randy Somers DO;  Location: [facility] ENDOSCOPY;  Service: Gastroenterology    CYSTOSCOPY, RETROGRADE PYELOGRAM AND STENT INSERTION Left 3/8/2019    Procedure: CYSTOSCOPY RETROGRADE BILATERAL PYELOGRAM;  Surgeon: Jos Sylvester MD;  Location: [facility] OR;  Service: Urology    ENDOSCOPY N/A 12/11/2018    Procedure: ESOPHAGOGASTRODUODENOSCOPY WITH ANESTHESIA;  Surgeon: Randy Somers DO;  Location: [facility] ENDOSCOPY;  Service: Gastroenterology    ENDOSCOPY N/A 4/29/2019    Procedure: ESOPHAGOGASTRODUODENOSCOPY WITH ANESTHESIA;  Surgeon: Lilliam Jj MD;  Location: [facility] OR;  Service: Gastroenterology    ENDOSCOPY N/A 5/9/2019    Procedure: ESOPHAGOGASTRODUODENOSCOPY WITH ANESTHESIA;  Surgeon: Pilo Bansal MD;  Location: [facility] ENDOSCOPY;  Service: Gastroenterology    EYE SURGERY Left 1964    FOOT SURGERY Right 1966    joint    FRACTURE SURGERY      PACEMAKER IMPLANTATION Left 8/29/2024    Procedure: Leadless pacemaker implant and AVN ablation;  Surgeon: Carlitos Bowen MD;  Location: [facility] CATH INVASIVE LOCATION;  Service: Cardiovascular;  Laterality: Left;     FAMILY HISTORY  family history includes Hypertension in his mother; Leukemia in his father.    SOCIAL HISTORY  Social History     Tobacco Use    Smoking status:  Former     Current packs/day: 0.00     Types: Cigarettes     Start date: 10/29/1954     Quit date: 10/29/2003     Years since quittin.8    Smokeless tobacco: Former     Types: Chew     Quit date:    Vaping Use    Vaping status: Never Used   Substance Use Topics    Alcohol use: No    Drug use: No     ALLERGIES  Penicillins     MEDICATIONS  No current facility-administered medications for this visit.  No current outpatient medications on file.    Facility-Administered Medications Ordered in Other Visits:     acetaminophen (TYLENOL) tablet 650 mg, 650 mg, Oral, Q6H PRN, Prasanth Stewart MD    aspirin EC tablet 81 mg, 81 mg, Oral, Daily, Prasanth Stewart MD, 81 mg at 24 0831    atorvastatin (LIPITOR) tablet 20 mg, 20 mg, Oral, Nightly, Prasanth Stewart MD, 20 mg at 24    sennosides-docusate (PERICOLACE) 8.6-50 MG per tablet 2 tablet, 2 tablet, Oral, BID PRN **AND** polyethylene glycol (MIRALAX) packet 17 g, 17 g, Oral, Daily PRN **AND** bisacodyl (DULCOLAX) EC tablet 5 mg, 5 mg, Oral, Daily PRN **AND** bisacodyl (DULCOLAX) suppository 10 mg, 10 mg, Rectal, Daily PRN, Prasanth Stewart MD    [Held by provider] bumetanide (BUMEX) tablet 0.5 mg, 0.5 mg, Oral, Daily, Prasanth Stewart MD, 0.5 mg at 24 0832    cefepime 2000 mg IVPB in 100 mL NS (MBP), 2,000 mg, Intravenous, Q24H, Prasanth Stewart MD, 2,000 mg at 24 0127    dexAMETHasone (DECADRON) injection 6 mg, 6 mg, Intravenous, Daily, Prasanth Stewart MD, 6 mg at 24 1839    dextromethorphan polistirex ER (DELSYM) 30 MG/5ML oral suspension 60 mg, 60 mg, Oral, Q12H PRN, Prasanth Stewart MD    Enoxaparin Sodium (LOVENOX) syringe 30 mg, 30 mg, Subcutaneous, Daily, Prasanth Stewart MD, 30 mg at 24 1839    fluticasone (FLONASE) 50 MCG/ACT nasal spray 1 spray, 1 spray, Nasal, Daily, Prasanth Stewart MD, 1 spray at 24 0833    ipratropium (ATROVENT HFA) inhaler 2 puff, 2 puff,  Inhalation, 4x Daily - RT, Prasanth Stewart MD, 2 puff at 09/06/24 1420    isosorbide mononitrate (IMDUR) 24 hr tablet 30 mg, 30 mg, Oral, Daily, Prasanth Stewart MD, 30 mg at 09/06/24 0832    melatonin tablet 5 mg, 5 mg, Oral, Nightly PRN, Prasanth Stewart MD    metoprolol succinate XL (TOPROL-XL) 24 hr tablet 50 mg, 50 mg, Oral, Daily, Prasanth Stewart MD, 50 mg at 09/06/24 0831    nitroglycerin (NITROSTAT) SL tablet 0.4 mg, 0.4 mg, Sublingual, Q5 Min PRN, Prasanth Stewart MD    ondansetron (ZOFRAN) injection 4 mg, 4 mg, Intravenous, Q6H PRN, Prasanth Stewart MD    pantoprazole (PROTONIX) EC tablet 40 mg, 40 mg, Oral, Daily, Prasanth Stewart MD, 40 mg at 09/06/24 0832    Pharmacy Consult - Remdesivir for Mild to Moderate COVID-19 (Within 5 days of symptom onset)- only if contraindicated to Paxlovid, , Does not apply, Continuous PRN, Cornelio Gordon DO    [COMPLETED] remdesivir 200 mg in sodium chloride 0.9 % 290 mL IVPB (powder vial), 200 mg, Intravenous, Q24H, 200 mg at 09/06/24 1416 **FOLLOWED BY** [START ON 9/7/2024] remdesivir 100 mg in sodium chloride 0.9 % 270 mL IVPB (powder vial), 100 mg, Intravenous, Q24H, Cornelio Gordon DO    sodium bicarbonate tablet 650 mg, 650 mg, Oral, TID, Prasanth Stewart MD, 650 mg at 09/06/24 0831    [COMPLETED] Insert Peripheral IV, , , Once **AND** sodium chloride 0.9 % flush 10 mL, 10 mL, Intravenous, PRN, Prasanth Stewart MD    sodium chloride 0.9 % flush 10 mL, 10 mL, Intravenous, Q12H, Prasanth Stewart MD, 10 mL at 09/06/24 0832    sodium chloride 0.9 % flush 10 mL, 10 mL, Intravenous, PRN, Prasanth Stewart MD    sodium chloride 0.9 % infusion 40 mL, 40 mL, Intravenous, PRN, Prasanth Stewart MD    tamsulosin (FLOMAX) 24 hr capsule 0.4 mg, 0.4 mg, Oral, Nightly, Prasanth Stewart MD, 0.4 mg at 09/05/24 2010    Current outpatient and discharge medications have been reconciled for the  "patient.  Reviewed by: Danny Cool III, MD    The following portions of the patient's history were reviewed and updated as appropriate: allergies, current medications, past family history, past medical history, past social history, past surgical history and problem list.    REVIEW OF SYSTEMS  Review of Systems   Constitutional:  Positive for activity change and fatigue. Negative for appetite change, chills, diaphoresis, fever and unexpected weight change.   HENT: Negative.     Eyes: Negative.         Blind in left eye   Respiratory:  Positive for shortness of breath and wheezing. Negative for apnea, cough, choking, chest tightness and stridor.         Oxygen 3 lpm   Cardiovascular:  Positive for leg swelling. Negative for chest pain and palpitations.        Micra AV2 placed 08-  Dr. Bowen   Gastrointestinal: Negative.    Endocrine: Negative.    Genitourinary:  Positive for frequency. Negative for decreased urine volume, difficulty urinating, dysuria, enuresis, flank pain, genital sores, hematuria, penile discharge, penile pain, penile swelling, scrotal swelling, testicular pain and urgency.        Tamsulosin  On diuretics   Musculoskeletal: Negative.    Skin: Negative.    Allergic/Immunologic: Negative.    Neurological: Negative.    Hematological: Negative.    Psychiatric/Behavioral: Negative.       PHYSICAL EXAM  VITAL SIGNS:   Vitals:    09/03/24 1319   BP: 130/71   Pulse: 85   Resp: 24   SpO2: 96%   Weight: 77.6 kg (171 lb)   Height: 162.6 cm (64\")   PainSc: 0-No pain     Physical Exam  Vitals reviewed.   Constitutional:       Appearance: Normal appearance.      Comments: This patient is a moderately debilitated elderly male who is short of breath and on oxygen via nasal cannula   HENT:      Head: Normocephalic.      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   Eyes:      Pupils: Pupils are equal, round, and reactive to light.   Cardiovascular:      Rate and Rhythm: Normal " rate and regular rhythm.      Pulses: Normal pulses.      Heart sounds: Normal heart sounds.   Pulmonary:      Effort: No respiratory distress.      Breath sounds: Normal breath sounds. No wheezing.   Abdominal:      General: Bowel sounds are normal.      Palpations: There is no mass.   Musculoskeletal:         General: Normal range of motion.      Cervical back: Normal range of motion and neck supple. No tenderness.   Lymphadenopathy:      Cervical: No cervical adenopathy.   Skin:     General: Skin is warm and dry.   Neurological:      General: No focal deficit present.      Mental Status: He is alert and oriented to person, place, and time.      Motor: No weakness.   Psychiatric:         Mood and Affect: Mood normal.         Behavior: Behavior normal.          Performance Status: ECOG (3) Capable of limited self-care, confined to bed or chair > 50% of waking hours    Clinical Quality Measures  - Pain Documented by Standardized Tool, FPS Sharitawarren Prater reports a pain score of 0. Given his pain assessment as noted, treatment options were discussed and the following options were decided upon as a follow-up plan to address the patient's pain:  No pain, no plan given .  Pain Medications               acetaminophen (TYLENOL) 500 MG tablet Take 2 tablets by mouth Every Night.    aspirin 81 MG EC tablet Take 1 tablet by mouth Daily.          - Body Mass Index Screening and Follow-Up Plan  BMI is >= 25 and <30. (Overweight) The following options were offered after discussion;: none (medical contraindication)    - Tobacco Use: Screening and Cessation Intervention  Social History    Tobacco Use      Smoking status: Former        Packs/day: 0.00        Types: Cigarettes        Start date: 10/29/1954        Quit date: 10/29/2003        Years since quittin.8      Smokeless tobacco: Former        Types: Chew        Quit date:     - Advanced Care Planning Advance Care Planning   ACP discussion was held with the  patient during this visit. Patient does not have an advance directive, information provided.    - Depression screening - PHQ-9 Total Score:      ASSESSMENT AND PLAN  1. Malignant neoplasm of upper lobe of right lung    2. History of radiation therapy    3. Former smoker      No orders of the defined types were placed in this encounter.    RECOMMENDATIONS:Karoline Prater is status post completion of radiation therapy to the lung and presents to our clinic today to review recent PET scan. PET Scan completed on 08/20/2024 revealed abnormal PET/CT, there is FDG avidity associated with the spiculated pleural-based nodule in the medial right lung apex with a maximum SUV of 4.6, there is also a spiculated nodule in the right upper lobe anteriorly that measures 1.3 cm, with a maximum SUV of 3.9, which is suspicious for active neoplasm. Concerning the pleural-based mass in the right upper lobe, this may be related to an area of resolving pneumonia given the interval decrease in size and SUV of 4.5 max. Hypermetabolic subcarinal lymphadenopathy is present with an SUV of 5.4. This is suspicious for metastatic lymphadenopathy. Diffuse low-level activity associated with posterior lateral pleural thickening in the right lung base, with extensive interstitial disease.    PET scan is suspicious for recurrent disease.  Biopsy for confirmation is offered but the patient declines.  At this point he desires supportive care only.    We will see him back in 4 months for follow-up with MARITZA Gay.  I have instructed him to continue to see the other health care providers as per their scheduling.    Time Spent: I spent 40 minutes caring for Karoline on this date of service. This time includes time spent by me in the following activities: preparing for the visit, reviewing tests, obtaining and/or reviewing a separately obtained history, performing a medically appropriate examination and/or evaluation, counseling and educating the  patient/family/caregiver, ordering medications, tests, or procedures, and documenting information in the medical record.   Danny Cool III, MD  09/03/2024

## 2024-08-30 NOTE — THERAPY EVALUATION
"Patient's assisted living nurse Katherine called with concern of patient's high BP readings. BP today is 188/108 HR 76. Prior readings: 2/19/23 was 178/99, 2/18/23 was WNL, 2/17/23 was 165/93, 2/12/23 was 204/112. Medication changes listed below. Patient does not endorse major symptoms other than a slight headache. Will route to patient's care team with Dr. Zuniga for further follow-up.    1. BLOOD PRESSURE: \"What is the blood pressure?\" \"Did you take at least two measurements 5 minutes apart?\" 188/108.  2. ONSET: \"When did you take your blood pressure?\" Prior to the call.  3. HOW: \"How did you obtain the blood pressure?\" (e.g., visiting nurse, automatic home BP monitor) AL nurse.  4. HISTORY: \"Do you have a history of high blood pressure?\" Yes,  5. MEDICATIONS: \"Are you taking any medications for blood pressure?\" \"Have you missed any doses recently?\" Dr. Zuniga increased Imdur to 60mg, Dr. Wilcox changed clonidine from prn to now 0.1mg BID.   6. OTHER SYMPTOMS: \"Do you have any symptoms?\" (e.g., headache, chest pain, blurred vision, difficulty breathing, weakness) Slight headache.    " Patient Name: Karoline Prater  : 1940    MRN: [id number]                              Today's Date: 2024       Admit Date: 2024    Visit Dx:     ICD-10-CM ICD-9-CM   1. [place] (acute kidney injury)  N17.9 584.9   2. Narrow complex tachycardia  I47.19 427.89   3. Elevated troponin  R79.89 790.6   4. Pneumonia of right upper lobe due to infectious organism  J18.9 486   5. Atrial flutter, unspecified type  I48.92 427.32   6. Encounter for examination for normal comparison and control in clinical research program  Z00.6 V70.7   7. Acute on chronic respiratory failure with hypoxia  J96.21 518.84     799.02   8. COPD with acute exacerbation  J44.1 491.21   9. S/P radiation > 12 weeks  Z92.3 V15.3     Patient Active Problem List   Diagnosis    Dyspnea    Essential hypertension    NSVT (nonsustained ventricular tachycardia)    Malignant neoplasm of upper lobe of right lung    Stage 3b chronic kidney disease    Pancytopenia due to antineoplastic chemotherapy    Pneumonia due to infectious organism    Function kidney decreased    Cellulitis    Acute on chronic respiratory failure with hypoxia    Pulmonary fibrosis    Macrocytic anemia    Thrombocytopenia    Sepsis    Right pneumothorax    Hyperkalemia    Acute kidney injury superimposed on CKD stage 3b    GERD without esophagitis    A-fib    Former smoker    Chest pain    Tachycardia    Bilateral lower extremity edema    Metastatic adenocarcinoma of the RUL    Chronic diastolic congestive heart failure    COPD with acute exacerbation    S/P radiation > 12 weeks    CHF (congestive heart failure)    History of radiation therapy    Chronic heart failure with preserved ejection fraction (HFpEF)    Narrow complex tachycardia    Atrial flutter    Encounter for examination for normal comparison and control in clinical research program     Past Medical History:   Diagnosis Date    Arthritis     Atrial fibrillation with rapid ventricular response 2019     Blindness of left eye     BPH with obstruction/lower urinary tract symptoms     Cancer     stomach & lung    CHF (congestive heart failure)     CKD (chronic kidney disease) stage 3, GFR 30-59 ml/min     COPD with acute exacerbation 8/3/2024    Coronary artery disease     Elevated cholesterol     Essential hypertension 10/02/2017    Fibrotic lung diseases     GERD (gastroesophageal reflux disease)     Hearing loss     History of transfusion     Hydronephrosis of left kidney     Hyperlipidemia     Hypertension     pt was taken off of all of his medications for BP (atenolol, lisinopril, lasix) because his BP kept bottoming out so his primary dr told him to discontinue them 1-2 months ago (Jan/Feb 2019). pt states he takes no medications currently.    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 Patel is just going to keep an eye on it and not do surgery right now.    Mediastinal adenopathy 10/24/2018    [facility]    Monoclonal gammopathy of unknown significance (MGUS) 09/11/2018    Pancreatic mass     pt states he had this in 2013 but it went away on its own. Now recent CT shows it has come back so he is going to have an ultrasound on 3/13/19.    Shortness of breath      Past Surgical History:   Procedure Laterality Date    ABDOMINAL SURGERY      BRONCHOSCOPY N/A 10/24/2018    Procedure: BRONCHOSCOPY WITH BIOPSY, EBUS;  Surgeon: Gareth Becerra MD;  Location: [facility] OR;  Service: Pulmonary    CHOLECYSTECTOMY      COLONOSCOPY N/A 1/3/2019    Procedure: COLONOSCOPY WITH ANESTHESIA;  Surgeon: Randy Somers DO;  Location: [facility] ENDOSCOPY;  Service: Gastroenterology    CYSTOSCOPY, RETROGRADE PYELOGRAM AND STENT INSERTION Left 3/8/2019    Procedure: CYSTOSCOPY RETROGRADE BILATERAL PYELOGRAM;  Surgeon: Jos Sylvester MD;  Location: [facility] OR;  Service: Urology     ENDOSCOPY N/A 12/11/2018    Procedure: ESOPHAGOGASTRODUODENOSCOPY WITH ANESTHESIA;  Surgeon: Randy Somers DO;  Location: [facility] ENDOSCOPY;  Service: Gastroenterology    ENDOSCOPY N/A 4/29/2019    Procedure: ESOPHAGOGASTRODUODENOSCOPY WITH ANESTHESIA;  Surgeon: Lilliam Jj MD;  Location: [facility] OR;  Service: Gastroenterology    ENDOSCOPY N/A 5/9/2019    Procedure: ESOPHAGOGASTRODUODENOSCOPY WITH ANESTHESIA;  Surgeon: Pilo Bansal MD;  Location: [facility] ENDOSCOPY;  Service: Gastroenterology    EYE SURGERY Left 1964    FOOT SURGERY Right 1966    joint    FRACTURE SURGERY        General Information       Row Name 08/30/24 1101          General Information    Patient Profile Reviewed yes  -JJ     Prior Level of Function independent:;all household mobility;transfer;bed mobility;ADL's;dependent:;driving  -JJ     Existing Precautions/Restrictions fall;oxygen therapy device and L/min  4L O2/NC  -JJ     Barriers to Rehab medically complex;visual deficit;previous functional deficit  -JJ       Row Name 08/30/24 1101          Occupational Profile    Environmental Supports and Barriers (Occupational Profile) walk-in shower with shower seat  -J       Row Name 08/30/24 1101          Living Environment    People in Home sibling(s)  -JJ       Row Name 08/30/24 1101          Home Main Entrance    Number of Stairs, Main Entrance one;two  -JJ     Stair Railings, Main Entrance none  -JJ       Row Name 08/30/24 1101          Stairs Within Home, Primary    Number of Stairs, Within Home, Primary none  -JJ       Row Name 08/30/24 1101          Cognition    Orientation Status (Cognition) oriented x 4  -JJ       Row Name 08/30/24 1101          Safety Issues, Functional Mobility    Impairments Affecting Function (Mobility) shortness of breath;endurance/activity tolerance;balance;pain  -JJ               User Key  (r) = Recorded By, (t) = Taken By, (c) = Cosigned By      Initials Name Provider Type    Betsey Birmingham  OTR/L, CSRS Occupational Therapist                     Mobility/ADL's       Row Name 08/30/24 1101          Bed Mobility    Bed Mobility supine-sit;sit-supine;scooting/bridging  -     Scooting/Bridging [place] (Bed Mobility) supervision  -     Supine-Sit [place] (Bed Mobility) contact guard  -     Sit-Supine [place] (Bed Mobility) contact guard  -     Assistive Device (Bed Mobility) head of bed elevated;bed rails  -       Row Name 08/30/24 1101          Transfers    Transfers sit-stand transfer;stand-sit transfer  -       Row Name 08/30/24 1101          Sit-Stand Transfer    Sit-Stand [place] (Transfers) contact guard  -       Row Name 08/30/24 1101          Stand-Sit Transfer    Stand-Sit [place] (Transfers) contact guard  -       Row Name 08/30/24 1101          Functional Mobility    Functional Mobility- Ind. Level contact guard assist;minimum assist (75% patient effort)  -     Functional Mobility- Safety Issues supplemental O2  -     Functional Mobility- Comment amb into hallway. LOB x2 and often times found to be grabbing for objects to steady self. Offered use of HHA but pt declined. O2 sats dropped to 82% on 4L O2/NC with mobility. Pt reports this is baseline for him due to PF.  -       Row Name 08/30/24 1101          Activities of Daily Living    BADL Assessment/Intervention lower body dressing  -       Row Name 08/30/24 1101          Lower Body Dressing Assessment/Training    [place] Level (Lower Body Dressing) don;socks;contact guard assist  -     Position (Lower Body Dressing) edge of bed sitting  -               User Key  (r) = Recorded By, (t) = Taken By, (c) = Cosigned By      Initials Name Provider Type    Betsey Birmingham OTR/L, JORDANS Occupational Therapist                   Obj/Interventions       Row Name 08/30/24 1101          Sensory Assessment (Somatosensory)    Sensory Assessment (Somatosensory) UE sensation intact  -[facility]  Name 08/30/24 1101          Vision Assessment/Intervention    Visual Impairment/Limitations corrective lenses full-time  -     Vision Assessment Comment complete blindness in L eye.  -JJ       Row Name 08/30/24 1101          Range of Motion Comprehensive    General Range of Motion bilateral upper extremity ROM WFL  -J       Row Name 08/30/24 1101          Strength Comprehensive (MMT)    Comment, General Manual Muscle Testing (MMT) Assessment B UE strength 5/5  -       Row Name 08/30/24 1101          Balance    Balance Assessment sitting static balance;sitting dynamic balance;standing static balance;standing dynamic balance  -JJ     Static Sitting Balance independent  -JJ     Dynamic Sitting Balance standby assist  -JJ     Position, Sitting Balance unsupported;sitting edge of bed  -JJ     Static Standing Balance contact guard  -JJ     Dynamic Standing Balance contact guard;minimal assist  -JJ     Position/Device Used, Standing Balance unsupported  -JJ               User Key  (r) = Recorded By, (t) = Taken By, (c) = Cosigned By      Initials Name Provider Type    JBetsey Reyna, OTR/L, CSRS Occupational Therapist                   Goals/Plan       Row Name 08/30/24 1101          Dressing Goal 1 (OT)    Activity/Device (Dressing Goal 1, OT) dressing skills, all  -     [place]/Cues Needed (Dressing Goal 1, OT) modified independence  -     Time Frame (Dressing Goal 1, OT) long term goal (LTG);by discharge  -     Strategies/Barriers (Dressing Goal 1, OT) AE PRN while implementing energy conservation techniques.  -     Progress/Outcome (Dressing Goal 1, OT) new goal  -       Row Name 08/30/24 1101          Problem Specific Goal 1 (OT)    Problem Specific Goal 1 (OT) Pt will implement energy conservations techniques 100% of the times during adl tasks to increase his activity tolerance and safety with adls.  -     Time Frame (Problem Specific Goal 1, OT) long term goal (LTG);by discharge  -      Progress/Outcome (Problem Specific Goal 1, OT) new goal  -       Row Name 08/30/24 1101          Therapy Assessment/Plan (OT)    Planned Therapy Interventions (OT) activity tolerance training;adaptive equipment training;BADL retraining;functional balance retraining;transfer/mobility retraining;occupation/activity based interventions;patient/caregiver education/training  -               User Key  (r) = Recorded By, (t) = Taken By, (c) = Cosigned By      Initials Name Provider Type    Betsey Birmingham, OTAMANDO/L, CSRS Occupational Therapist                   Clinical Impression       Row Name 08/30/24 1101          Pain Assessment    Pretreatment Pain Rating 4/10  -     Pain Location lower  -     Pain Location - back  -     Pain Intervention(s) Medication (See MAR);Repositioned;Ambulation/increased activity  -       Row Name 08/30/24 1101          Plan of Care Review    Plan of Care Reviewed With patient  -     Outcome Evaluation OT eval completed. Pt presents alert and oriented x4, sitting up in bed on 4L O2/NC. He reports at baseline being independent with all adls and mobility, residing at home with brother. Today he demonstrates decreased activity tolerance, balance and increased SOA with activity. He was able to bring self to sitting at EOB with CGA. Performed sit <> stand t/fs with Sup-CGA and short distance functional mobility with CGA-Min A. Experienced LOB x2 and often times found to be reaching for objects when ambulating to steady self. Therapist offered use of HHA but pt declined. O2 sats dropped to 82% on 4L with mobility, but pt states this is baseline for him due to PF. Returned back to folwers in bed at end of session with CGA. He would benefit from skilled OT services to address these deficits. Recommend d/c home with assist and HH.  -       Row Name 08/30/24 1101          Therapy Assessment/Plan (OT)    Rehab Potential (OT) good, to achieve stated therapy goals  -     Criteria for  Skilled Therapeutic Interventions Met (OT) yes;skilled treatment is necessary  -JJ     Therapy Frequency (OT) 5 times/wk  -JJ     Predicted Duration of Therapy Intervention (OT) 10 days  -JJ       Row Name 08/30/24 1101          Therapy Plan Review/Discharge Plan (OT)    Anticipated Discharge Disposition (OT) home with assist;home with home health  -J       Row Name 08/30/24 1101          Positioning and Restraints    Pre-Treatment Position in bed  -JJ     Post Treatment Position bed  -JJ     In Bed notified nsg;fowlers;call light within reach;encouraged to call for assist;side rails up x3  -JJ               User Key  (r) = Recorded By, (t) = Taken By, (c) = Cosigned By      Initials Name Provider Type    Betsey Birmingham, OTR/L, CSRS Occupational Therapist                   Outcome Measures       Row Name 08/30/24 1101          How much help from another is currently needed...    Putting on and taking off regular lower body clothing? 3  -JJ     Bathing (including washing, rinsing, and drying) 3  -JJ     Toileting (which includes using toilet bed pan or urinal) 3  -JJ     Putting on and taking off regular upper body clothing 4  -JJ     Taking care of personal grooming (such as brushing teeth) 4  -JJ     Eating meals 4  -JJ     AM-PAC 6 Clicks Score (OT) 21  -J       Row Name 08/30/24 0820          How much help from another person do you currently need...    Turning from your back to your side while in flat bed without using bedrails? 4  -AA     Moving from lying on back to sitting on the side of a flat bed without bedrails? 4  -AA     Moving to and from a bed to a chair (including a wheelchair)? 3  -AA     Standing up from a chair using your arms (e.g., wheelchair, bedside chair)? 3  -AA     Climbing 3-5 steps with a railing? 3  -AA     To walk in hospital room? 3  -AA     AM-PAC 6 Clicks Score (PT) 20  -AA     Highest Level of Mobility Goal 6 --> Walk 10 steps or more  -AA       Row Name 08/30/24 1101           Functional Assessment    Outcome Measure Options AM-PAC 6 Clicks Daily Activity (OT)  -               User Key  (r) = Recorded By, (t) = Taken By, (c) = Cosigned By      Initials Name Provider Type    Lena Zhao, RN Registered Nurse    Betsey Birmingham, OTR/L, JULES Occupational Therapist                    Occupational Therapy Education       Title: PT OT SLP Therapies (In Progress)       Topic: Occupational Therapy (In Progress)       Point: ADL training (Done)       Description:   Instruct learner(s) on proper safety adaptation and remediation techniques during self care or transfers.   Instruct in proper use of assistive devices.                  Learning Progress Summary             Patient Acceptance, E, VU by ALFRED at 8/30/2024 1143                         Point: Home exercise program (Not Started)       Description:   Instruct learner(s) on appropriate technique for monitoring, assisting and/or progressing therapeutic exercises/activities.                  Learner Progress:  Not documented in this visit.              Point: Precautions (Done)       Description:   Instruct learner(s) on prescribed precautions during self-care and functional transfers.                  Learning Progress Summary             Patient Acceptance, E, VU by ALFRED at 8/30/2024 1143                         Point: Body mechanics (Not Started)       Description:   Instruct learner(s) on proper positioning and spine alignment during self-care, functional mobility activities and/or exercises.                  Learner Progress:  Not documented in this visit.                              User Key       Initials Effective Dates Name Provider Type Discipline    ALFRED 07/11/23 -  Betsey Arias OTR/L, JULES Occupational Therapist OT                  OT Recommendation and Plan  Planned Therapy Interventions (OT): activity tolerance training, adaptive equipment training, BADL retraining, functional balance retraining,  transfer/mobility retraining, occupation/activity based interventions, patient/caregiver education/training  Therapy Frequency (OT): 5 times/wk  Plan of Care Review  Plan of Care Reviewed With: patient  Outcome Evaluation: OT eval completed. Pt presents alert and oriented x4, sitting up in bed on 4L O2/NC. He reports at baseline being independent with all adls and mobility, residing at home with brother. Today he demonstrates decreased activity tolerance, balance and increased SOA with activity. He was able to bring self to sitting at EOB with CGA. Performed sit <> stand t/fs with Sup-CGA and short distance functional mobility with CGA-Min A. Experienced LOB x2 and often times found to be reaching for objects when ambulating to steady self. Therapist offered use of HHA but pt declined. O2 sats dropped to 82% on 4L with mobility, but pt states this is baseline for him due to PF. Returned back to folwers in bed at end of session with CGA. He would benefit from skilled OT services to address these deficits. Recommend d/c home with assist and HH.     Time Calculation:         Time Calculation- OT       Row Name 08/30/24 1101             Time Calculation- OT    OT Start Time 1101  -JJ      OT Stop Time 1140  -JJ      OT Time Calculation (min) 39 min  -JJ      OT Received On 08/30/24  -J      OT Goal Re-Cert Due Date 09/09/24  -J         Untimed Charges    OT Eval/Re-eval Minutes 39  -JJ         Total Minutes    Untimed Charges Total Minutes 39  -JJ       Total Minutes 39  -JJ                User Key  (r) = Recorded By, (t) = Taken By, (c) = Cosigned By      Initials Name Provider Type    Betsey Birmingham OTR/L, CSRS Occupational Therapist                  Therapy Charges for Today       Code Description Service Date Service Provider Modifiers Qty    40845308566 HC OT EVAL LOW COMPLEXITY 3 8/30/2024 Betsey Arias OTR/L, JULES GO 1                 SAUL Acuña, JULES  8/30/2024

## 2024-08-30 NOTE — PLAN OF CARE
Problem: Adult Inpatient Plan of Care  Goal: Plan of Care Review  Outcome: Ongoing, Progressing  Flowsheets (Taken 8/30/2024 0406)  Outcome Evaluation: A&OX4. SBA. VSS. Pacemaker placement 08/29/2024. RT groin site clean, dry, and intact. Patient c/o minimal pain. PRN tylenol given. UOP adequate. Safety maintained. Will continue to monitor.  Goal: Patient-Specific Goal (Individualized)  Outcome: Ongoing, Progressing  Goal: Absence of Hospital-Acquired Illness or Injury  Outcome: Ongoing, Progressing  Intervention: Identify and Manage Fall Risk  Recent Flowsheet Documentation  Taken 8/30/2024 0353 by Gladis Davis RN  Safety Promotion/Fall Prevention: safety round/check completed  Taken 8/30/2024 0200 by Gladis Davis RN  Safety Promotion/Fall Prevention: safety round/check completed  Taken 8/30/2024 0000 by Gladis Davis RN  Safety Promotion/Fall Prevention: safety round/check completed  Taken 8/29/2024 2300 by Gladis Davis RN  Safety Promotion/Fall Prevention: safety round/check completed  Taken 8/29/2024 2200 by Gladis Davis RN  Safety Promotion/Fall Prevention: safety round/check completed  Taken 8/29/2024 2100 by Gladis Davis RN  Safety Promotion/Fall Prevention: safety round/check completed  Taken 8/29/2024 2000 by Gladis Davis RN  Safety Promotion/Fall Prevention: safety round/check completed  Taken 8/29/2024 1930 by Gladis Davis RN  Safety Promotion/Fall Prevention: safety round/check completed  Intervention: Prevent Skin Injury  Recent Flowsheet Documentation  Taken 8/29/2024 1930 by Gladis Davis RN  Body Position: position changed independently  Skin Protection:   adhesive use limited   tubing/devices free from skin contact  Intervention: Prevent and Manage VTE (Venous Thromboembolism) Risk  Recent Flowsheet Documentation  Taken 8/29/2024 1930 by Gladis Davis RN  Activity Management: bedrest  VTE Prevention/Management: other (see comments)  Range of Motion: active ROM (range of motion)  encouraged  Intervention: Prevent Infection  Recent Flowsheet Documentation  Taken 8/29/2024 1930 by Gladis Davis RN  Infection Prevention:   rest/sleep promoted   single patient room provided  Goal: Optimal Comfort and Wellbeing  Outcome: Ongoing, Progressing  Intervention: Monitor Pain and Promote Comfort  Recent Flowsheet Documentation  Taken 8/30/2024 0353 by Gladsi Davis RN  Pain Management Interventions: see MAR  Intervention: Provide Person-Centered Care  Recent Flowsheet Documentation  Taken 8/29/2024 1930 by Gladis Davis RN  Trust Relationship/Rapport:   care explained   questions answered   questions encouraged  Goal: Readiness for Transition of Care  Outcome: Ongoing, Progressing     Problem: Fall Injury Risk  Goal: Absence of Fall and Fall-Related Injury  Outcome: Ongoing, Progressing  Intervention: Identify and Manage Contributors  Recent Flowsheet Documentation  Taken 8/29/2024 1930 by Gladis Davis RN  Medication Review/Management: medications reviewed  Self-Care Promotion: independence encouraged  Intervention: Promote Injury-Free Environment  Recent Flowsheet Documentation  Taken 8/30/2024 0353 by Gladis Davis RN  Safety Promotion/Fall Prevention: safety round/check completed  Taken 8/30/2024 0200 by Gladis Davis RN  Safety Promotion/Fall Prevention: safety round/check completed  Taken 8/30/2024 0000 by Gladis Davis RN  Safety Promotion/Fall Prevention: safety round/check completed  Taken 8/29/2024 2300 by Gladis Davis RN  Safety Promotion/Fall Prevention: safety round/check completed  Taken 8/29/2024 2200 by Gladis Davis RN  Safety Promotion/Fall Prevention: safety round/check completed  Taken 8/29/2024 2100 by Gladis Davis RN  Safety Promotion/Fall Prevention: safety round/check completed  Taken 8/29/2024 2000 by Gladis Davis RN  Safety Promotion/Fall Prevention: safety round/check completed  Taken 8/29/2024 1930 by Gladis Davis RN  Safety Promotion/Fall Prevention: safety  round/check completed     Problem: Fluid and Electrolyte Imbalance (Acute Kidney Injury/Impairment)  Goal: Fluid and Electrolyte Balance  Outcome: Ongoing, Progressing  Intervention: Monitor and Manage Fluid and Electrolyte Balance  Recent Flowsheet Documentation  Taken 8/29/2024 1930 by Gladis Davis RN  Fluid/Electrolyte Management: fluids provided     Problem: Oral Intake Inadequate (Acute Kidney Injury/Impairment)  Goal: Optimal Nutrition Intake  Outcome: Ongoing, Progressing     Problem: Renal Function Impairment (Acute Kidney Injury/Impairment)  Goal: Effective Renal Function  Outcome: Ongoing, Progressing  Intervention: Monitor and Support Renal Function  Recent Flowsheet Documentation  Taken 8/29/2024 1930 by Gladis Davis RN  Medication Review/Management: medications reviewed     Problem: Gas Exchange Impaired  Goal: Optimal Gas Exchange  Outcome: Ongoing, Progressing  Intervention: Optimize Oxygenation and Ventilation  Recent Flowsheet Documentation  Taken 8/29/2024 1930 by Gladis Davis RN  Head of Bed (HOB) Positioning: HOB at 30 degrees     Problem: Adjustment to Illness (Heart Failure)  Goal: Optimal Coping  Outcome: Ongoing, Progressing  Intervention: Support Psychosocial Response  Recent Flowsheet Documentation  Taken 8/29/2024 1930 by Gladis Davis RN  Family/Support System Care: support provided     Problem: Cardiac Output Decreased (Heart Failure)  Goal: Optimal Cardiac Output  Outcome: Ongoing, Progressing     Problem: Dysrhythmia (Heart Failure)  Goal: Stable Heart Rate and Rhythm  Outcome: Ongoing, Progressing     Problem: Fluid Imbalance (Heart Failure)  Goal: Fluid Balance  Outcome: Ongoing, Progressing  Intervention: Monitor and Manage Fluid Balance  Recent Flowsheet Documentation  Taken 8/29/2024 1930 by Gladis Davis RN  Fluid/Electrolyte Management: fluids provided     Problem: Functional Ability Impaired (Heart Failure)  Goal: Optimal Functional Ability  Outcome: Ongoing,  Progressing  Intervention: Optimize Functional Ability  Recent Flowsheet Documentation  Taken 8/29/2024 1930 by Gladis Davis RN  Activity Management: bedrest  Self-Care Promotion: independence encouraged     Problem: Oral Intake Inadequate (Heart Failure)  Goal: Optimal Nutrition Intake  Outcome: Ongoing, Progressing     Problem: Respiratory Compromise (Heart Failure)  Goal: Effective Oxygenation and Ventilation  Outcome: Ongoing, Progressing  Intervention: Promote Airway Secretion Clearance  Recent Flowsheet Documentation  Taken 8/29/2024 1930 by Gladis Davis RN  Cough And Deep Breathing: done independently per patient     Problem: Sleep Disordered Breathing (Heart Failure)  Goal: Effective Breathing Pattern During Sleep  Outcome: Ongoing, Progressing     Problem: Coping Ineffective (Oncology Care)  Goal: Effective Coping  Outcome: Ongoing, Progressing  Intervention: Support and Enhance Coping Strategies  Recent Flowsheet Documentation  Taken 8/29/2024 1930 by Gladis Davis RN  Family/Support System Care: support provided     Problem: Fatigue (Oncology Care)  Goal: Improved Activity Tolerance  Outcome: Ongoing, Progressing  Intervention: Promote Improved Energy  Recent Flowsheet Documentation  Taken 8/29/2024 1930 by Gladis Davis RN  Activity Management: bedrest     Problem: Oral Intake Altered (Oncology Care)  Goal: Optimal Oral Intake  Outcome: Ongoing, Progressing     Problem: Oral Mucositis (Oncology Care)  Goal: Improved Oral Mucous Membrane Integrity  Outcome: Ongoing, Progressing     Problem: Pain Acute (Oncology Care)  Goal: Optimal Pain Control  Outcome: Ongoing, Progressing  Intervention: Prevent or Manage Pain  Recent Flowsheet Documentation  Taken 8/29/2024 1930 by Gladis Davis RN  Medication Review/Management: medications reviewed  Intervention: Develop Pain Management Plan  Recent Flowsheet Documentation  Taken 8/30/2024 0353 by Gladis Davis RN  Pain Management Interventions: see MAR      Problem: Infection  Goal: Absence of Infection Signs and Symptoms  Outcome: Ongoing, Progressing     Problem: Heart Failure Comorbidity  Goal: Maintenance of Heart Failure Symptom Control  Outcome: Ongoing, Progressing  Intervention: Maintain Heart Failure-Management  Recent Flowsheet Documentation  Taken 8/29/2024 1930 by Gladis Davis RN  Medication Review/Management: medications reviewed   Goal Outcome Evaluation:              A&OX4. SBA. VSS. O2 90-93% @4L [facility]. Pacemaker placement 08/29/2024. RT groin site clean, dry, and intact. Patient c/o minimal pain. PRN tylenol given. UOP adequate. Safety maintained. Will continue to monitor.

## 2024-08-30 NOTE — THERAPY EVALUATION
Patient Name: Karoline Prater  : 1940    MRN: [id number]                              Today's Date: 2024       Admit Date: 2024    Visit Dx:     ICD-10-CM ICD-9-CM   1. CAROLINA (acute kidney injury)  N17.9 584.9   2. Narrow complex tachycardia  I47.19 427.89   3. Elevated troponin  R79.89 790.6   4. Pneumonia of right upper lobe due to infectious organism  J18.9 486   5. Atrial flutter, unspecified type  I48.92 427.32   6. Encounter for examination for normal comparison and control in clinical research program  Z00.6 V70.7   7. Acute on chronic respiratory failure with hypoxia  J96.21 518.84     799.02   8. COPD with acute exacerbation  J44.1 491.21   9. S/P radiation > 12 weeks  Z92.3 V15.3   10. Impaired mobility [Z74.09]  Z74.09 799.89     Patient Active Problem List   Diagnosis    Dyspnea    Essential hypertension    NSVT (nonsustained ventricular tachycardia)    Malignant neoplasm of upper lobe of right lung    Stage 3b chronic kidney disease    Pancytopenia due to antineoplastic chemotherapy    Pneumonia due to infectious organism    Function kidney decreased    Cellulitis    Acute on chronic respiratory failure with hypoxia    Pulmonary fibrosis    Macrocytic anemia    Thrombocytopenia    Sepsis    Right pneumothorax    Hyperkalemia    Acute kidney injury superimposed on CKD stage 3b    GERD without esophagitis    A-fib    Former smoker    Chest pain    Tachycardia    Bilateral lower extremity edema    Metastatic adenocarcinoma of the RUL    Chronic diastolic congestive heart failure    COPD with acute exacerbation    S/P radiation > 12 weeks    CHF (congestive heart failure)    History of radiation therapy    Chronic heart failure with preserved ejection fraction (HFpEF)    Narrow complex tachycardia    Atrial flutter    Encounter for examination for normal comparison and control in clinical research program     Past Medical History:   Diagnosis Date    Arthritis     Atrial fibrillation  with rapid ventricular response 05/31/2019    Blindness of left eye     BPH with obstruction/lower urinary tract symptoms     Cancer     stomach & lung    CHF (congestive heart failure)     CKD (chronic kidney disease) stage 3, GFR 30-59 ml/min     COPD with acute exacerbation 8/3/2024    Coronary artery disease     Elevated cholesterol     Essential hypertension 10/02/2017    Fibrotic lung diseases     GERD (gastroesophageal reflux disease)     Hearing loss     History of transfusion     Hydronephrosis of left kidney     Hyperlipidemia     Hypertension     pt was taken off of all of his medications for BP (atenolol, lisinopril, lasix) because his BP kept bottoming out so his primary dr told him to discontinue them 1-2 months ago (Jan/Feb 2019). pt states he takes no medications currently.    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 Patel is just going to keep an eye on it and not do surgery right now.    Mediastinal adenopathy 10/24/2018    [facility]    Monoclonal gammopathy of unknown significance (MGUS) 09/11/2018    Pancreatic mass     pt states he had this in 2013 but it went away on its own. Now recent CT shows it has come back so he is going to have an ultrasound on 3/13/19.    Shortness of breath      Past Surgical History:   Procedure Laterality Date    ABDOMINAL SURGERY      BRONCHOSCOPY N/A 10/24/2018    Procedure: BRONCHOSCOPY WITH BIOPSY, EBUS;  Surgeon: Gareth Becerra MD;  Location: [facility] OR;  Service: Pulmonary    CHOLECYSTECTOMY      COLONOSCOPY N/A 1/3/2019    Procedure: COLONOSCOPY WITH ANESTHESIA;  Surgeon: Randy Somers DO;  Location: [facility] ENDOSCOPY;  Service: Gastroenterology    CYSTOSCOPY, RETROGRADE PYELOGRAM AND STENT INSERTION Left 3/8/2019    Procedure: CYSTOSCOPY RETROGRADE BILATERAL PYELOGRAM;  Surgeon: Jos Sylvester  MD ORACIO;  Location: [facility] OR;  Service: Urology    ENDOSCOPY N/A 12/11/2018    Procedure: ESOPHAGOGASTRODUODENOSCOPY WITH ANESTHESIA;  Surgeon: Randy Somers DO;  Location:  [facility] ENDOSCOPY;  Service: Gastroenterology    ENDOSCOPY N/A 4/29/2019    Procedure: ESOPHAGOGASTRODUODENOSCOPY WITH ANESTHESIA;  Surgeon: Lilliam Jj MD;  Location:  [facility] OR;  Service: Gastroenterology    ENDOSCOPY N/A 5/9/2019    Procedure: ESOPHAGOGASTRODUODENOSCOPY WITH ANESTHESIA;  Surgeon: Pilo Bansal MD;  Location:  [facility] ENDOSCOPY;  Service: Gastroenterology    EYE SURGERY Left 1964    FOOT SURGERY Right 1966    joint    FRACTURE SURGERY        General Information       Row Name 08/30/24 1030          Physical Therapy Time and Intention    Document Type evaluation  DX:Acute on chronic respiratory failure and COPD with acute exacerbation, LE edema Post-op ablation and pacemaker placement. PMX: HFpEF, chronic diastolic HF, pulmonary fibrosis, stage 3b CKD.  -LEIDY (r) BL (t) LEIDY (c)     Mode of Treatment physical therapy  presents with weakness, fatigue, LE edema and SOB. complaint of low back pain.  -LEIDY (r) BL (t) LEIDY (c)       Row Name 08/30/24 1030          General Information    Patient Profile Reviewed yes  -LEIDY (r) BL (t) LEIDY (c)     Prior Level of Function independent:;gait;transfer;bed mobility;ADL's;feeding;grooming;dressing;bathing;dependent:;home management;driving  -LEIDY (r) BL (t) LEIDY (c)     Existing Precautions/Restrictions fall;oxygen therapy device and L/min  -LEIDY (r) BL (t) LEIDY (c)     Barriers to Rehab medically complex;previous functional deficit;physical barrier;visual deficit  -LEIDY (r) BL (t) LEIDY (c)       Row Name 08/30/24 1030          Living Environment    People in Home sibling(s)  relies on brother for transportation and home management. Has a walk in shower with shower seat. Occasionally uses quad cane to help with ambulation.  -LEIDY (r) BL (t) LEIDY (c)       Row Name 08/30/24 1030          Home Main Entrance     Number of Stairs, Main Entrance one  -LEIDY (r) BL (t) LEIDY (c)     Stair Railings, Main Entrance none  -LEIDY (r) BL (t) LEIDY (c)       Row Name 08/30/24 1030          Stairs Within Home, Primary    Number of Stairs, Within Home, Primary none  -LEIDY (r) BL (t) LEIDY (c)     Stair Railings, Within Home, Primary none  -LEIDY (r) BL (t) LEIDY (c)       Row Name 08/30/24 1030          Cognition    Orientation Status (Cognition) oriented x 4  -LEIDY (r) BL (t) LEIDY (c)       Row Name 08/30/24 1030          Safety Issues, Functional Mobility    Safety Issues Affecting Function (Mobility) ability to follow commands;awareness of need for assistance;safety precaution awareness  -LEIDY (r) BL (t) LEIDY (c)     Impairments Affecting Function (Mobility) balance;coordination;endurance/activity tolerance;shortness of breath  -LEIDY (r) BL (t) LEIDY (c)     Comment, Safety Issues/Impairments (Mobility) pt. experienced several instances of LOB with ambulation and was wall surfing the whole duration of ambulation.  -LEIDY (r) BL (t) LEIDY (c)               User Key  (r) = Recorded By, (t) = Taken By, (c) = Cosigned By      Initials Name Provider Type    Cristian Coello, PT DPT Physical Therapist    Kane Cornejo, PT Student PT Student                   Mobility       Row Name 08/30/24 1030          Bed Mobility    Bed Mobility rolling left;supine-sit;sit-supine;scooting/bridging  -LEIDY (r) BL (t) LEIDY (c)     Rolling Left Coffee (Bed Mobility) modified independence  -LEIDY (r) BL (t) LEIDY (c)     Scooting/Bridging Coffee (Bed Mobility) set up  -LEIDY (r) BL (t) LEIDY (c)     Supine-Sit Coffee (Bed Mobility) modified independence  -LEIDY (r) BL (t) LEIDY (c)     Sit-Supine Coffee (Bed Mobility) modified independence  -LEIDY (r) BL (t) LEIDY (c)     Assistive Device (Bed Mobility) bed rails  -LEIDY (r) BL (t) LEIDY (c)       Row Name 08/30/24 1030          Sit-Stand Transfer    Sit-Stand Coffee (Transfers) contact guard  -LEIDY (r) BL (t) LEIDY (c)     Comment, (Sit-Stand  Transfer) pt. had no LOB when coming to standing from EOB.  -LEIDY (r) BL (t) LEIDY (c)       Row Name 08/30/24 1030          Gait/Stairs (Locomotion)    [place] Level (Gait) minimum assist (75% patient effort)  -LEIDY (r) BL (t) LEIDY (c)     Assistive Device (Gait) --  pt. did not have AD but would benefit from a RW for stability  -LEIDY (r) BL (t) LEIDY (c)     Distance in Feet (Gait) 100  -LEIDY (r) BL (t) LEIDY (c)     Deviations/Abnormal Patterns (Gait) festinating/shuffling;gait speed decreased;stride length decreased  -LEIDY (r) BL (t) LEIDY (c)     Bilateral Gait Deviations decreased arm swing;forward flexed posture  -LEIDY (r) BL (t) LEIDY (c)     Comment, (Gait/Stairs) pt. would benefit from RW with ambulation. pt. has low endurance and requires 5-6 min to return to baseline.  -LEIDY (r) BL (t) LEIDY (c)               User Key  (r) = Recorded By, (t) = Taken By, (c) = Cosigned By      Initials Name Provider Type    Cristian Coello, PT DPT Physical Therapist    Kane Cornejo PT Student PT Student                   Obj/Interventions       Row Name 08/30/24 1141          Range of Motion Comprehensive    General Range of Motion bilateral lower extremity ROM WFL  -LEIDY (r) BL (t) LEIDY (c)       Row Name 08/30/24 1141          Strength Comprehensive (MMT)    General Manual Muscle Testing (MMT) Assessment no strength deficits identified  -LEIDY (r) BL (t) LEIDY (c)     Comment, General Manual Muscle Testing (MMT) Assessment LE strength grossly 4/5  -LEIDY (r) BL (t) LEIDY (c)       Row Name 08/30/24 1141          Balance    Balance Assessment sitting static balance;sitting dynamic balance;sit to stand dynamic balance;standing static balance;standing dynamic balance  -LEIDY (r) BL (t) LEIDY (c)     Static Sitting Balance modified independence  -LEIDY (r) BL (t) LEIDY (c)     Dynamic Sitting Balance modified independence  -LEIDY (r) BL (t) LEIDY (c)     Position, Sitting Balance sitting edge of bed  -LEIDY (r) BL (t) LEIDY (c)     Sit to Stand Dynamic Balance contact guard  -LEIDY (r)  BL (t) LEIDY (c)     Static Standing Balance contact guard  -LEIDY (r) BL (t) LEIDY (c)     Dynamic Standing Balance contact guard  -LEIDY (r) BL (t) LEIDY (c)     Position/Device Used, Standing Balance other (see comments)  pt. uses therapist and walls for external stability.  -LEIDY (r) BL (t) LEIDY (c)     Balance Interventions sitting;standing;sit to stand;static;dynamic  -LEIDY (r) BL (t) LEIDY (c)     Comment, Balance pt. needed assistamce with balnce. Experienced LOB with ambulation several times.  -LEIDY (r) BL (t) LEIDY (c)       Row Name 08/30/24 1141          Sensory Assessment (Somatosensory)    Sensory Assessment (Somatosensory) sensation intact  -LEIDY (r) BL (t) LEIDY (c)               User Key  (r) = Recorded By, (t) = Taken By, (c) = Cosigned By      Initials Name Provider Type    Cristian Coello, PT DPT Physical Therapist    Kane Cornejo, PT Student PT Student                   Goals/Plan       Row Name 08/30/24 1030          Transfer Goal 1 (PT)    Activity/Assistive Device (Transfer Goal 1, PT) sit-to-stand/stand-to-sit;bed-to-chair/chair-to-bed  -LEIDY (r) BL (t) LEIDY (c)     [place] Level/Cues Needed (Transfer Goal 1, PT) modified independence  -LEIDY (r) BL (t) LEIDY (c)     Time Frame (Transfer Goal 1, PT) long term goal (LTG)  -LEIDY (r) BL (t) LEIDY (c)     Strategies/Barriers (Transfers Goal 1, PT) decreased balance  -LEIDY (r) BL (t) LEIDY (c)     Progress/Outcome (Transfer Goal 1, PT) new goal  -LEIDY (r) BL (t) LEIDY (c)       Row Name 08/30/24 1030          Gait Training Goal 1 (PT)    Activity/Assistive Device (Gait Training Goal 1, PT) gait (walking locomotion);assistive device use;decrease fall risk;forward stepping;improve balance and speed;increase energy conservation;increase endurance/gait distance  -LEIDY (r) BL (t) LEIDY (c)     [place] Level (Gait Training Goal 1, PT) standby assist  -LEIDY (r) BL (t) LEIDY (c)     Distance (Gait Training Goal 1, PT) 150'  -LEIDY (r) BL (t) LEIDY (c)     Time Frame (Gait Training Goal 1, PT) long term goal  (LTG)  -LEIDY (r) BL (t) LEIDY (c)     Strategies/Barriers (Gait Training Goal 1, PT) decreased balance and endurance  -LEIDY (r) BL (t) LEIDY (c)     Progress/Outcome (Gait Training Goal 1, PT) new goal  -LEIDY (r) BL (t) LEIDY (c)       Row Name 08/30/24 1030          Stairs Goal 1 (PT)    Activity/Assistive Device (Stairs Goal 1, PT) ascending stairs;descending stairs;decrease fall risk;improve balance and speed;assistive device use  -LEIDY (r) BL (t) LEIDY (c)     [place] Level/Cues Needed (Stairs Goal 1, PT) standby assist  -LEIDY (r) BL (t) LEIDY (c)     Number of Stairs (Stairs Goal 1, PT) 2  -LEIDY (r) BL (t) LEIDY (c)     Time Frame (Stairs Goal 1, PT) long term goal (LTG)  -LEIDY (r) BL (t) LEIDY (c)     Strategies/Barriers (Stairs Goal 1, PT) low balance and endurance  -LEIDY (r) BL (t) LEIDY (c)     Progress/Outcome (Stairs Goal 1, PT) new goal  -LEIDY (r) BL (t) LEIDY (c)       Row Name 08/30/24 1030          Therapy Assessment/Plan (PT)    Planned Therapy Interventions (PT) balance training;bed mobility training;gait training;strengthening;stair training;home exercise program;patient/family education;transfer training  -LEIDY               User Key  (r) = Recorded By, (t) = Taken By, (c) = Cosigned By      Initials Name Provider Type    Cristian Coello, PT DPT Physical Therapist    Kane Cornejo, PT Student PT Student                   Clinical Impression       Row Name 08/30/24 1030          Pain    Pretreatment Pain Rating 4/10  -LEIDY (r) BL (t) LEIDY (c)     Posttreatment Pain Rating 4/10  -LEIDY (r) BL (t) LEIDY (c)     Pain Location - back;chest  -LEIDY (r) BL (t) LEIDY (c)     Pain Intervention(s) Ambulation/increased activity  -LEIDY (r) BL (t) LEIDY (c)     Additional Documentation Pain Scale: Numbers Pre/Post-Treatment (Group)  -LEIDY (r) BL (t) LEIDY (c)       Row Name 08/30/24 1030          Plan of Care Review    Plan of Care Reviewed With patient  -LEIDY (r) WESLEY (t) LEIDY (c)     Progress no change  -LEIDY (r) BL (t) LEIDY (c)     Outcome Evaluation PT Eval complete.  Orientation x4, Pt. presents with extreme fatigue, weakness, and SOB. Pt. reccieved pacemaker and ablation (8/29). Exhibited 4/5 LE strength grossly and sensation was intact. Pt. able to complete all bed mobility independently and sit EOB with no support. Pt. ambulated 100' with min. assist. through gait belt for stability and pt. used walls for stability as well. Required 5-6 min for O2 sat to get back to baseline. Skilled therapy recommended to increase endurance, help with balance deficits and educate pt. on AD usage. Recommended D/C home with home health.  -LEIDY (r) BL (t) LEIDY (c)       Row Name 08/30/24 1030          Therapy Assessment/Plan (PT)    Patient/Family Therapy Goals Statement (PT) Return home  -LEIDY (r) BL (t) LEIDY (c)     Rehab Potential (PT) fair, will monitor progress closely  -LEIDY (r) BL (t) LEIDY (c)     Criteria for Skilled Interventions Met (PT) yes;meets criteria;skilled treatment is necessary  -LEIDY (r) BL (t) LEIDY (c)     Therapy Frequency (PT) 2 times/day  -LEIDY (r) BL (t) LEIDY (c)     Predicted Duration of Therapy Intervention (PT) Until D/C or goals mets.  -LEIDY (r) BL (t) LEIDY (c)       Row Name 08/30/24 1030          Vital Signs    Pre SpO2 (%) 92  -LEIDY (r) BL (t) LEIDY (c)     O2 Delivery Pre Treatment supplemental O2  -LEIDY (r) BL (t) LEIDY (c)     Intra SpO2 (%) 81  -LEIDY (r) BL (t) LEIDY (c)     O2 Delivery Intra Treatment supplemental O2  -LEIDY (r) BL (t) LEIDY (c)     Post SpO2 (%) 92  -LEIDY (r) BL (t) LEIDY (c)     O2 Delivery Post Treatment supplemental O2  -LEIDY (r) BL (t) LEIDY (c)     Pre Patient Position Supine  -LEIDY (r) BL (t) LEIDY (c)     Intra Patient Position Standing  -LEIDY (r) BL (t) LEIDY (c)     Post Patient Position Supine  -LEIDY (r) BL (t) LEIDY (c)       Row Name 08/30/24 1030          Positioning and Restraints    Pre-Treatment Position in bed  -LEIDY (r) BL (t) LEIDY (c)     Post Treatment Position bed  -LEIDY (r) BL (t) LEIDY (c)     In Bed supine;call light within reach;encouraged to call for assist;legs elevated  -LEIDY (r) BL (t) LEIDY  (c)               User Key  (r) = Recorded By, (t) = Taken By, (c) = Cosigned By      Initials Name Provider Type    Cristian Coello, PT DPT Physical Therapist    Kane Cornejo, SARAH Student PT Student                   Outcome Measures       Row Name 08/30/24 1030 08/30/24 0820       How much help from another person do you currently need...    Turning from your back to your side while in flat bed without using bedrails? 4  -LEIDY 4  -AA    Moving from lying on back to sitting on the side of a flat bed without bedrails? 4  -LEIDY 4  -AA    Moving to and from a bed to a chair (including a wheelchair)? 3  -LEIDY 3  -AA    Standing up from a chair using your arms (e.g., wheelchair, bedside chair)? 3  -LEIDY 3  -AA    Climbing 3-5 steps with a railing? 2  -LEIDY 3  -AA    To walk in hospital room? 2  -LEIDY 3  -AA    AM-PAC 6 Clicks Score (PT) 18  -LEIDY 20  -AA    Highest Level of Mobility Goal 6 --> Walk 10 steps or more  -LEIDY 6 --> Walk 10 steps or more  -AA      Row Name 08/30/24 1101 08/30/24 1030       Functional Assessment    Outcome Measure Options AM-PAC 6 Clicks Daily Activity (OT)  -ALFRED AM-PAC 6 Clicks Basic Mobility (PT)  -LEIYD              User Key  (r) = Recorded By, (t) = Taken By, (c) = Cosigned By      Initials Name Provider Type    Cristian Coello, PT DPT Physical Therapist    Lena Zhao, RN Registered Nurse    Betsey Birmingham, OTR/L, CSRS Occupational Therapist                                 Physical Therapy Education       Title: PT OT SLP Therapies (In Progress)       Topic: Physical Therapy (In Progress)       Point: Mobility training (Not Started)       Learner Progress:  Not documented in this visit.              Point: Home exercise program (Not Started)       Learner Progress:  Not documented in this visit.              Point: Body mechanics (Done)       Learning Progress Summary             Patient Acceptance, E, VU by LEIDY at 8/30/2024 6977    Comment: pt. educated on how to move safely when  getting out of bed and performing sit to stand. Educated on how they are a good candidate for a AD with ambulation.                         Point: Precautions (Done)       Learning Progress Summary             Patient AcceptanceMERRITT VU by LEIDY at 8/30/2024 1308    Comment: pt. educated on how to move safely when getting out of bed and performing sit to stand. Educated on how they are a good candidate for a AD with ambulation.                                         User Key       Initials Effective Dates Name Provider Type Discipline    LEIDY 02/03/23 -  Cristian Hirsch, PT DPT Physical Therapist PT                  PT Recommendation and Plan     Plan of Care Reviewed With: patient  Progress: no change  Outcome Evaluation: PT Eval complete. Orientation x4, Pt. presents with extreme fatigue, weakness, and SOB. Pt. reccieved pacemaker and ablation (8/29). Exhibited 4/5 LE strength grossly and sensation was intact. Pt. able to complete all bed mobility independently and sit EOB with no support. Pt. ambulated 100' with min. assist. through gait belt for stability and pt. used walls for stability as well. Required 5-6 min for O2 sat to get back to baseline. Skilled therapy recommended to increase endurance, help with balance deficits and educate pt. on AD usage. Recommended D/C home with home health.     Time Calculation:         PT Charges       Row Name 08/30/24 1310             Time Calculation    Start Time 1106  chart review for 25min  -LEIDY      Stop Time 1128  -LEIDY      Time Calculation (min) 22 min  -LEIDY      PT Received On 08/30/24  -LEIDY      PT Goal Re-Cert Due Date 09/09/24  -LEIDY         Untimed Charges    PT Eval/Re-eval Minutes 47  -LEIDY         Total Minutes    Untimed Charges Total Minutes 47  -LEIDY       Total Minutes 47  -LEIDY                User Key  (r) = Recorded By, (t) = Taken By, (c) = Cosigned By      Initials Name Provider Type    Cristian Coello, PT DPT Physical Therapist                      PT  G-Codes  Outcome Measure Options: AM-PAC 6 Clicks Daily Activity (OT)  AM-PAC 6 Clicks Score (PT): 18  AM-PAC 6 Clicks Score (OT): 21  PT Discharge Summary  Anticipated Discharge Disposition (PT): home with home health    Kane Mora, PT Student  8/30/2024

## 2024-08-30 NOTE — PLAN OF CARE
Goal Outcome Evaluation:  Plan of Care Reviewed With: patient        Progress: no change  Outcome Evaluation: PT Eval complete. Orientation x4, Pt. presents with extreme fatigue, weakness, and SOB. Pt. reccieved pacemaker and ablation (8/29). Exhibited 4/5 LE strength grossly and sensation was intact. Pt. able to complete all bed mobility independently and sit EOB with no support. Pt. ambulated 100' with min. assist. through gait belt for stability and pt. used walls for stability as well. Required 5-6 min for O2 sat to get back to baseline. Skilled therapy recommended to increase endurance, help with balance deficits and educate pt. on AD usage. Recommended D/C home with home health.      Anticipated Discharge Disposition (PT): home with home health

## 2024-08-31 ENCOUNTER — NURSE TRIAGE (OUTPATIENT)
Dept: CALL CENTER | Facility: HOSPITAL | Age: 84
End: 2024-08-31
Payer: MEDICARE

## 2024-08-31 ENCOUNTER — READMISSION MANAGEMENT (OUTPATIENT)
Dept: CALL CENTER | Facility: HOSPITAL | Age: 84
End: 2024-08-31
Payer: MEDICARE

## 2024-08-31 LAB — BACTERIA SPEC AEROBE CULT: NORMAL

## 2024-08-31 NOTE — THERAPY DISCHARGE NOTE
Acute Care - Occupational Therapy Discharge Summary  [facility]     Patient Name: Karoline Prater  : 1940  MRN: [id number]    Today's Date: 2024                 Admit Date: 2024        OT Recommendation and Plan    Visit Dx:    ICD-10-CM ICD-9-CM   1. CAROLINA (acute kidney injury)  N17.9 584.9   2. Narrow complex tachycardia  I47.19 427.89   3. Elevated troponin  R79.89 790.6   4. Pneumonia of right upper lobe due to infectious organism  J18.9 486   5. Atrial flutter, unspecified type  I48.92 427.32   6. Encounter for examination for normal comparison and control in clinical research program  Z00.6 V70.7   7. Acute on chronic respiratory failure with hypoxia  J96.21 518.84     799.02   8. COPD with acute exacerbation  J44.1 491.21   9. S/P radiation > 12 weeks  Z92.3 V15.3   10. Impaired mobility [Z74.09]  Z74.09 799.89                OT Rehab Goals       Row Name 24 1500             Dressing Goal 1 (OT)    Activity/Device (Dressing Goal 1, OT) dressing skills, all  -AC      [place]/Cues Needed (Dressing Goal 1, OT) modified independence  -AC      Time Frame (Dressing Goal 1, OT) long term goal (LTG);by discharge  -AC      Strategies/Barriers (Dressing Goal 1, OT) AE PRN while implementing energy conservation techniques.  -AC      Progress/Outcome (Dressing Goal 1, OT) goal not met  -AC         Problem Specific Goal 1 (OT)    Problem Specific Goal 1 (OT) Pt will implement energy conservations techniques 100% of the times during adl tasks to increase his activity tolerance and safety with adls.  -AC      Time Frame (Problem Specific Goal 1, OT) long term goal (LTG);by discharge  -AC      Progress/Outcome (Problem Specific Goal 1, OT) goal not met  -AC                User Key  (r) = Recorded By, (t) = Taken By, (c) = Cosigned By      Initials Name Provider Type Discipline    AC Jerry Nelson, OTR/L, CNT Occupational Therapist OT                                OT Discharge  Summary  Anticipated Discharge Disposition (OT): home with home health  Reason for Discharge: Discharge from facility  Outcomes Achieved: Refer to plan of care for updates on goals achieved  Discharge Destination: Home with home health      Jerry Nelson, OTR/L, CNT  8/31/2024

## 2024-08-31 NOTE — TELEPHONE ENCOUNTER
Reviewed with caller instructions for taking Bumetanide as ordered per provider upon hospital discharge 08/30/2024:  bumetanide (BUMEX) 0.5 MG tablet [[id number]]    Order Details  Dose: 0.5 mg Route: Oral Frequency: Daily PRN for 2 LB's overnight or 3 LB's in a week.  New onset of Shortness of breath or increasing bilateral lower extremity edema   Dispense Quantity: -- Refills: --          Sig: Take 1 tablet by mouth Daily As Needed (2 LB's overnight or 3 LB's in a week.  New onset of Shortness of breath or increasing bilateral lower extremity edema) for up to 90 days.         Start Date: 08/30/24 End Date: 11/28/24   Written Date: 08/30/24 Expiration Date: 08/30/25   Original Order: bumetanide (BUMEX) 0.5 MG tablet [[id number]]   Providers    Ordering Provider and Authorizing Provider:  Farida Santana APRN     Encouraged caller to call back in a.m. if has any further questions after checking weight in a.m.. Verbalized understanding. States has had mild soa with exertion with o2 sat dropping to 88% but recovers to 95% within 23 minutes of rest.   Denies any chest pain or irregular heartbeat. Caller took Bumex this a.m. but did not weigh self. Caller plans to call back in a.m. after weighs self and review with nurse prior to taking med.  Reason for Disposition   Caller has medicine question only, adult not sick, and triager answers question    Additional Information   Negative: Intentional drug overdose and suicidal thoughts or ideas   Negative: Drug overdose and triager unable to answer question   Negative: Caller requesting a renewal or refill of a medicine patient is currently taking   Negative: Caller requesting information unrelated to medicine   Negative: Caller requesting information about COVID-19 Vaccine   Negative: Caller requesting information about Emergency Contraception   Negative: Caller requesting information about Combined Birth Control Pills   Negative: Caller requesting information about  Progestin Birth Control Pills   Negative: Caller requesting information about Post-Op pain or medicines   Negative: Caller requesting a prescription antibiotic (such as penicillin) for Strep throat and has a positive culture result   Negative: Caller requesting a prescription anti-viral med (such as Tamiflu) and has influenza (flu) symptoms   Negative: Immunization reaction suspected   Negative: Rash while taking a medicine or within 3 days of stopping it   Negative: Asthma and having symptoms of asthma (cough, wheezing, etc.)   Negative: Symptom of illness (e.g., headache, abdominal pain, earache, vomiting) that are more than mild   Negative: Breastfeeding questions about mother's medicines and diet   Negative: MORE THAN A DOUBLE DOSE of a prescription or over-the-counter (OTC) drug   Negative: DOUBLE DOSE (an extra dose or lesser amount) of prescription drug and any symptoms (e.g., dizziness, nausea, pain, sleepiness)   Negative: DOUBLE DOSE (an extra dose or lesser amount) of over-the-counter (OTC) drug and any symptoms (e.g., dizziness, nausea, pain, sleepiness)   Negative: Took another person's prescription drug   Negative: DOUBLE DOSE (an extra dose or lesser amount) of prescription drug and NO symptoms  (Exception: A double dose of antibiotics.)   Negative: Diabetes drug error or overdose (e.g., took wrong type of insulin or took extra dose)   Negative: Caller has medication question about med NOT prescribed by PCP and triager unable to answer question (e.g., compatibility with other med, storage)   Negative: Prescription not at pharmacy and was prescribed by PCP recently  (Exception: triager has access to EMR and prescription is recorded there. Go to Home Care and confirm for pharmacy.)   Negative: Pharmacy calling with prescription question and triager unable to answer question   Negative: Caller has URGENT medicine question about med that PCP or specialist prescribed and triager unable to answer question    "Negative: Caller has NON-URGENT medicine question about med that PCP or specialist prescribed and triager unable to answer question   Negative: Caller wants to use a complementary or alternative medicine   Negative: Medicine patch causing local rash or itching   Negative: Prescription request for new medicine (not a refill)   Negative: Prescription prescribed recently is not at pharmacy and triager has access to patient's EMR and prescription is recorded in the EMR   Negative: DOUBLE DOSE (an extra dose or lesser amount) of over-the-counter (OTC) drug and NO symptoms   Negative: DOUBLE DOSE (an extra dose or lesser amount) of antibiotic drug and NO symptoms   Negative: Caller has medicine question, adult has minor symptoms, caller declines triage, and triager answers question    Answer Assessment - Initial Assessment Questions  1. NAME of MEDICINE: \"What medicine(s) are you calling about?\"      Bumetanide  2. QUESTION: \"What is your question?\" (e.g., double dose of medicine, side effect)      Does not understand direxction on AVS summary  3. PRESCRIBER: \"Who prescribed the medicine?\" Reason: if prescribed by specialist, call should be referred to that group.      Farida , APRN  4. SYMPTOMS: \"Do you have any symptoms?\" If Yes, ask: \"What symptoms are you having?\"  \"How bad are the symptoms (e.g., mild, moderate, severe)      Mild SOA with exertion, recovers within 2 minutes of rest  5. PREGNANCY:  \"Is there any chance that you are pregnant?\" \"When was your last menstrual period?\"      N/A    Protocols used: Medication Question Call-ADULT-[place]    "

## 2024-08-31 NOTE — OUTREACH NOTE
Prep Survey      Flowsheet Row Responses   [facility] facility patient discharged from? [place]   Is LACE score < 7 ? No   Eligibility Readm Mgmt   Discharge diagnosis *Acute kidney injury superimposed on CKD stage 3b   Does the patient have one of the following disease processes/diagnoses(primary or secondary)? Other   Does the patient have Home health ordered? No   Is there a DME ordered? No   Prep survey completed? Yes            EZRA MALDONADO - Registered Nurse

## 2024-09-01 ENCOUNTER — NURSE TRIAGE (OUTPATIENT)
Dept: CALL CENTER | Facility: HOSPITAL | Age: 84
End: 2024-09-01
Payer: MEDICARE

## 2024-09-01 NOTE — TELEPHONE ENCOUNTER
"Reason for Disposition   General information question, no triage required and triager able to answer question    Additional Information   Negative: [1] Caller is not with the adult (patient) AND [2] reporting urgent symptoms   Negative: Lab result questions   Negative: Medication questions   Negative: Caller can't be reached by phone   Negative: Caller has already spoken to PCP or another triager   Negative: RN needs further essential information from caller in order to complete triage   Negative: Requesting regular office appointment   Negative: [1] Caller requesting NON-URGENT health information AND [2] PCP's office is the best resource   Negative: Health Information question, no triage required and triager able to answer question    Answer Assessment - Initial Assessment Questions  1. REASON FOR CALL or QUESTION: \"What is your reason for calling today?\" or \"How can I best help you?\" or \"What question do you have that I can help answer?\"      Was told to call back and let triage know if he had to take meds or or not    Protocols used: Information Only Call - No Triage-ADULT-    "

## 2024-09-02 NOTE — THERAPY DISCHARGE NOTE
Acute Care - Physical Therapy Discharge Summary  [facility]       Patient Name: Karoline Prater  : 1940  MRN: [id number]    Today's Date: 2024                 Admit Date: 2024      PT Recommendation and Plan    Visit Dx:    ICD-10-CM ICD-9-CM   1. [place] (acute kidney injury)  N17.9 584.9   2. Narrow complex tachycardia  I47.19 427.89   3. Elevated troponin  R79.89 790.6   4. Pneumonia of right upper lobe due to infectious organism  J18.9 486   5. Atrial flutter, unspecified type  I48.92 427.32   6. Encounter for examination for normal comparison and control in clinical research program  Z00.6 V70.7   7. Acute on chronic respiratory failure with hypoxia  J96.21 518.84     799.02   8. COPD with acute exacerbation  J44.1 491.21   9. S/P radiation > 12 weeks  Z92.3 V15.3   10. Impaired mobility [Z74.09]  Z74.09 799.89                PT Rehab Goals       Row Name 24 0841             Transfer Goal 1 (PT)    Activity/Assistive Device (Transfer Goal 1, PT) sit-to-stand/stand-to-sit;bed-to-chair/chair-to-bed  -MARIBELL      [place] Level/Cues Needed (Transfer Goal 1, PT) modified independence  -MARIBELL      Time Frame (Transfer Goal 1, PT) long term goal (LTG)  -MARIBELL      Strategies/Barriers (Transfers Goal 1, PT) decreased balance  -MARIBELL      Progress/Outcome (Transfer Goal 1, PT) goal not met  -MARIBELL         Gait Training Goal 1 (PT)    Activity/Assistive Device (Gait Training Goal 1, PT) gait (walking locomotion);assistive device use;decrease fall risk;forward stepping;improve balance and speed;increase energy conservation;increase endurance/gait distance  -MARIBELL      [place] Level (Gait Training Goal 1, PT) standby assist  -MARIBELL      Distance (Gait Training Goal 1, PT) 150'  -MARIBELL      Time Frame (Gait Training Goal 1, PT) long term goal (LTG)  -MARIBELL      Strategies/Barriers (Gait Training Goal 1, PT) decreased balance and endurance  -MARIBELL      Progress/Outcome (Gait Training Goal 1, PT) goal not met  -MARIBELL          Stairs Goal 1 (PT)    Activity/Assistive Device (Stairs Goal 1, PT) ascending stairs;descending stairs;decrease fall risk;improve balance and speed;assistive device use  -MARIBELL      Falls Level/Cues Needed (Stairs Goal 1, PT) standby assist  -MARIBELL      Number of Stairs (Stairs Goal 1, PT) 2  -MARIBELL      Time Frame (Stairs Goal 1, PT) long term goal (LTG)  -MRAIBELL      Strategies/Barriers (Stairs Goal 1, PT) low balance and endurance  -MARIBELL      Progress/Outcome (Stairs Goal 1, PT) goal not met  -MARIBELL                User Key  (r) = Recorded By, (t) = Taken By, (c) = Cosigned By      Initials Name Provider Type Discipline    Alex Ferguson, PTA Physical Therapist Assistant PT                        PT Discharge Summary  Anticipated Discharge Disposition (PT): home with home health  Reason for Discharge: Discharge from facility  Outcomes Achieved: Refer to plan of care for updates on goals achieved  Discharge Destination: Home with home health      Alex Schwab PTA   9/2/2024

## 2024-09-03 ENCOUNTER — HOSPITAL ENCOUNTER (OUTPATIENT)
Dept: RADIATION ONCOLOGY | Facility: HOSPITAL | Age: 84
Setting detail: RADIATION/ONCOLOGY SERIES
End: 2024-09-03
Payer: MEDICARE

## 2024-09-03 ENCOUNTER — OFFICE VISIT (OUTPATIENT)
Age: 84
End: 2024-09-03
Payer: MEDICARE

## 2024-09-03 VITALS
OXYGEN SATURATION: 96 % | HEIGHT: 64 IN | DIASTOLIC BLOOD PRESSURE: 71 MMHG | BODY MASS INDEX: 29.19 KG/M2 | RESPIRATION RATE: 24 BRPM | SYSTOLIC BLOOD PRESSURE: 130 MMHG | HEART RATE: 85 BPM | WEIGHT: 171 LBS

## 2024-09-03 DIAGNOSIS — C34.11 MALIGNANT NEOPLASM OF UPPER LOBE OF RIGHT LUNG: Primary | ICD-10-CM

## 2024-09-03 DIAGNOSIS — Z87.891 FORMER SMOKER: ICD-10-CM

## 2024-09-03 DIAGNOSIS — Z92.3 HISTORY OF RADIATION THERAPY: ICD-10-CM

## 2024-09-03 PROCEDURE — G0463 HOSPITAL OUTPT CLINIC VISIT: HCPCS | Performed by: RADIOLOGY

## 2024-09-04 ENCOUNTER — READMISSION MANAGEMENT (OUTPATIENT)
Dept: CALL CENTER | Facility: HOSPITAL | Age: 84
End: 2024-09-04
Payer: MEDICARE

## 2024-09-04 NOTE — OUTREACH NOTE
Medical Week 1 Survey      Flowsheet Row Responses   [facility] patient discharged from? [facility]   Does the patient have one of the following disease processes/diagnoses(primary or secondary)? Other   Week 1 attempt successful? No   Unsuccessful attempts Attempt 1            Ericka HERNANDEZ - Licensed Nurse

## 2024-09-05 ENCOUNTER — TELEPHONE (OUTPATIENT)
Dept: CARDIOLOGY | Facility: CLINIC | Age: 84
End: 2024-09-05
Payer: MEDICARE

## 2024-09-05 ENCOUNTER — APPOINTMENT (OUTPATIENT)
Dept: GENERAL RADIOLOGY | Facility: HOSPITAL | Age: 84
DRG: 177 | End: 2024-09-05
Payer: MEDICARE

## 2024-09-05 ENCOUNTER — READMISSION MANAGEMENT (OUTPATIENT)
Dept: CALL CENTER | Facility: HOSPITAL | Age: 84
End: 2024-09-05
Payer: MEDICARE

## 2024-09-05 ENCOUNTER — HOSPITAL ENCOUNTER (INPATIENT)
Facility: HOSPITAL | Age: 84
LOS: 3 days | Discharge: HOME | DRG: 177 | End: 2024-09-08
Attending: EMERGENCY MEDICINE | Admitting: INTERNAL MEDICINE
Payer: MEDICARE

## 2024-09-05 DIAGNOSIS — J44.9 CHRONIC OBSTRUCTIVE PULMONARY DISEASE, UNSPECIFIED COPD TYPE: ICD-10-CM

## 2024-09-05 DIAGNOSIS — N18.9 CHRONIC RENAL IMPAIRMENT, UNSPECIFIED CKD STAGE: ICD-10-CM

## 2024-09-05 DIAGNOSIS — I50.9 ACUTE ON CHRONIC CONGESTIVE HEART FAILURE, UNSPECIFIED HEART FAILURE TYPE: Primary | ICD-10-CM

## 2024-09-05 DIAGNOSIS — R09.02 HYPOXIA: ICD-10-CM

## 2024-09-05 DIAGNOSIS — U07.1 COVID-19: ICD-10-CM

## 2024-09-05 LAB
ALBUMIN SERPL-MCNC: 3.1 G/DL (ref 3.5–5.2)
ALBUMIN/GLOB SERPL: 0.9 G/DL
ALP SERPL-CCNC: 101 U/L (ref 39–117)
ALT SERPL W P-5'-P-CCNC: 32 U/L (ref 1–41)
ANION GAP SERPL CALCULATED.3IONS-SCNC: 9 MMOL/L (ref 5–15)
ARTERIAL PATENCY WRIST A: POSITIVE
AST SERPL-CCNC: 32 U/L (ref 1–40)
ATMOSPHERIC PRESS: 756 MMHG
BASE EXCESS BLDA CALC-SCNC: 0.8 MMOL/L (ref 0–2)
BASOPHILS # BLD AUTO: 0.02 10*3/MM3 (ref 0–0.2)
BASOPHILS NFR BLD AUTO: 0.3 % (ref 0–1.5)
BDY SITE: ABNORMAL
BILIRUB SERPL-MCNC: 0.4 MG/DL (ref 0–1.2)
BODY TEMPERATURE: 37
BUN SERPL-MCNC: 28 MG/DL (ref 8–23)
BUN/CREAT SERPL: 11.9 (ref 7–25)
CA-I BLD-MCNC: 4.91 MG/DL (ref 4.6–5.4)
CALCIUM SPEC-SCNC: 9.3 MG/DL (ref 8.6–10.5)
CHLORIDE SERPL-SCNC: 103 MMOL/L (ref 98–107)
CO2 SERPL-SCNC: 24 MMOL/L (ref 22–29)
COHGB MFR BLD: 1.4 % (ref 0–5)
CREAT SERPL-MCNC: 2.36 MG/DL (ref 0.76–1.27)
D-LACTATE SERPL-SCNC: 1.6 MMOL/L (ref 0.5–2)
DEPRECATED RDW RBC AUTO: 57.9 FL (ref 37–54)
EGFRCR SERPLBLD CKD-EPI 2021: 26.8 ML/MIN/1.73
EOSINOPHIL # BLD AUTO: 0.11 10*3/MM3 (ref 0–0.4)
EOSINOPHIL NFR BLD AUTO: 1.7 % (ref 0.3–6.2)
ERYTHROCYTE [DISTWIDTH] IN BLOOD BY AUTOMATED COUNT: 16.4 % (ref 12.3–15.4)
GAS FLOW AIRWAY: 3 LPM
GLOBULIN UR ELPH-MCNC: 3.4 GM/DL
GLUCOSE SERPL-MCNC: 113 MG/DL (ref 65–99)
HCO3 BLDA-SCNC: 24.3 MMOL/L (ref 20–26)
HCT VFR BLD AUTO: 34.8 % (ref 37.5–51)
HCT VFR BLD CALC: 33.6 % (ref 38–51)
HGB BLD-MCNC: 10.6 G/DL (ref 13–17.7)
HGB BLDA-MCNC: 11 G/DL (ref 14–18)
IMM GRANULOCYTES # BLD AUTO: 0.09 10*3/MM3 (ref 0–0.05)
IMM GRANULOCYTES NFR BLD AUTO: 1.4 % (ref 0–0.5)
LYMPHOCYTES # BLD AUTO: 0.72 10*3/MM3 (ref 0.7–3.1)
LYMPHOCYTES NFR BLD AUTO: 11.3 % (ref 19.6–45.3)
Lab: ABNORMAL
MCH RBC QN AUTO: 29.8 PG (ref 26.6–33)
MCHC RBC AUTO-ENTMCNC: 30.5 G/DL (ref 31.5–35.7)
MCV RBC AUTO: 97.8 FL (ref 79–97)
METHGB BLD QL: 0.4 % (ref 0–3)
MODALITY: ABNORMAL
MONOCYTES # BLD AUTO: 0.74 10*3/MM3 (ref 0.1–0.9)
MONOCYTES NFR BLD AUTO: 11.7 % (ref 5–12)
NEUTROPHILS NFR BLD AUTO: 4.67 10*3/MM3 (ref 1.7–7)
NEUTROPHILS NFR BLD AUTO: 73.6 % (ref 42.7–76)
NRBC BLD AUTO-RTO: 0 /100 WBC (ref 0–0.2)
NT-PROBNP SERPL-MCNC: 4734 PG/ML (ref 0–1800)
OXYHGB MFR BLDV: 91.3 % (ref 94–99)
PCO2 BLDA: 34.4 MM HG (ref 35–45)
PCO2 TEMP ADJ BLD: 34.4 MM HG (ref 35–45)
PH BLDA: 7.46 PH UNITS (ref 7.35–7.45)
PH, TEMP CORRECTED: 7.46 PH UNITS (ref 7.35–7.45)
PLATELET # BLD AUTO: 225 10*3/MM3 (ref 140–450)
PMV BLD AUTO: 11.3 FL (ref 6–12)
PO2 BLDA: 63.3 MM HG (ref 83–108)
PO2 TEMP ADJ BLD: 63.3 MM HG (ref 83–108)
POTASSIUM BLDA-SCNC: 3.9 MMOL/L (ref 3.5–5.2)
POTASSIUM SERPL-SCNC: 4.2 MMOL/L (ref 3.5–5.2)
PROCALCITONIN SERPL-MCNC: 0.15 NG/ML (ref 0–0.25)
PROT SERPL-MCNC: 6.5 G/DL (ref 6–8.5)
RBC # BLD AUTO: 3.56 10*6/MM3 (ref 4.14–5.8)
S PNEUM AG SPEC QL LA: NEGATIVE
SAO2 % BLDCOA: 93 % (ref 94–99)
SARS-COV-2 RNA RESP QL NAA+PROBE: DETECTED
SODIUM BLDA-SCNC: 138 MMOL/L (ref 136–145)
SODIUM SERPL-SCNC: 136 MMOL/L (ref 136–145)
VENTILATOR MODE: ABNORMAL
WBC NRBC COR # BLD AUTO: 6.35 10*3/MM3 (ref 3.4–10.8)

## 2024-09-05 PROCEDURE — 25010000002 CEFEPIME PER 500 MG: Performed by: INTERNAL MEDICINE

## 2024-09-05 PROCEDURE — 3E0333Z INTRODUCTION OF ANTI-INFLAMMATORY INTO PERIPHERAL VEIN, PERCUTANEOUS APPROACH: ICD-10-PCS | Performed by: INTERNAL MEDICINE

## 2024-09-05 PROCEDURE — 87635 SARS-COV-2 COVID-19 AMP PRB: CPT | Performed by: EMERGENCY MEDICINE

## 2024-09-05 PROCEDURE — 85025 COMPLETE CBC W/AUTO DIFF WBC: CPT | Performed by: EMERGENCY MEDICINE

## 2024-09-05 PROCEDURE — 80053 COMPREHEN METABOLIC PANEL: CPT | Performed by: EMERGENCY MEDICINE

## 2024-09-05 PROCEDURE — 36415 COLL VENOUS BLD VENIPUNCTURE: CPT

## 2024-09-05 PROCEDURE — 71045 X-RAY EXAM CHEST 1 VIEW: CPT

## 2024-09-05 PROCEDURE — 25010000002 ENOXAPARIN PER 10 MG: Performed by: INTERNAL MEDICINE

## 2024-09-05 PROCEDURE — 87899 AGENT NOS ASSAY W/OPTIC: CPT | Performed by: INTERNAL MEDICINE

## 2024-09-05 PROCEDURE — 25010000002 DEXAMETHASONE PER 1 MG: Performed by: INTERNAL MEDICINE

## 2024-09-05 PROCEDURE — 93005 ELECTROCARDIOGRAM TRACING: CPT | Performed by: EMERGENCY MEDICINE

## 2024-09-05 PROCEDURE — 83880 ASSAY OF NATRIURETIC PEPTIDE: CPT | Performed by: EMERGENCY MEDICINE

## 2024-09-05 PROCEDURE — 82375 ASSAY CARBOXYHB QUANT: CPT

## 2024-09-05 PROCEDURE — 93010 ELECTROCARDIOGRAM REPORT: CPT | Performed by: EMERGENCY MEDICINE

## 2024-09-05 PROCEDURE — 87205 SMEAR GRAM STAIN: CPT | Performed by: INTERNAL MEDICINE

## 2024-09-05 PROCEDURE — 87070 CULTURE OTHR SPECIMN AEROBIC: CPT | Performed by: INTERNAL MEDICINE

## 2024-09-05 PROCEDURE — 87040 BLOOD CULTURE FOR BACTERIA: CPT | Performed by: EMERGENCY MEDICINE

## 2024-09-05 PROCEDURE — 87641 MR-STAPH DNA AMP PROBE: CPT | Performed by: INTERNAL MEDICINE

## 2024-09-05 PROCEDURE — 99285 EMERGENCY DEPT VISIT HI MDM: CPT

## 2024-09-05 PROCEDURE — 25010000002 BUMETANIDE PER 0.5 MG: Performed by: EMERGENCY MEDICINE

## 2024-09-05 PROCEDURE — 83050 HGB METHEMOGLOBIN QUAN: CPT

## 2024-09-05 PROCEDURE — 36600 WITHDRAWAL OF ARTERIAL BLOOD: CPT

## 2024-09-05 PROCEDURE — 84145 PROCALCITONIN (PCT): CPT | Performed by: INTERNAL MEDICINE

## 2024-09-05 PROCEDURE — 82805 BLOOD GASES W/O2 SATURATION: CPT

## 2024-09-05 PROCEDURE — 83605 ASSAY OF LACTIC ACID: CPT | Performed by: EMERGENCY MEDICINE

## 2024-09-05 RX ORDER — BISACODYL 10 MG
10 SUPPOSITORY, RECTAL RECTAL DAILY PRN
Status: DISCONTINUED | OUTPATIENT
Start: 2024-09-05 | End: 2024-09-08 | Stop reason: HOSPADM

## 2024-09-05 RX ORDER — POLYETHYLENE GLYCOL 3350 17 G/17G
17 POWDER, FOR SOLUTION ORAL DAILY PRN
Status: DISCONTINUED | OUTPATIENT
Start: 2024-09-05 | End: 2024-09-08 | Stop reason: HOSPADM

## 2024-09-05 RX ORDER — BUMETANIDE 0.25 MG/ML
2 INJECTION INTRAMUSCULAR; INTRAVENOUS ONCE
Status: COMPLETED | OUTPATIENT
Start: 2024-09-05 | End: 2024-09-05

## 2024-09-05 RX ORDER — PANTOPRAZOLE SODIUM 40 MG/1
40 TABLET, DELAYED RELEASE ORAL DAILY
Status: DISCONTINUED | OUTPATIENT
Start: 2024-09-06 | End: 2024-09-08 | Stop reason: HOSPADM

## 2024-09-05 RX ORDER — SODIUM CHLORIDE 0.9 % (FLUSH) 0.9 %
10 SYRINGE (ML) INJECTION AS NEEDED
Status: DISCONTINUED | OUTPATIENT
Start: 2024-09-05 | End: 2024-09-08 | Stop reason: HOSPADM

## 2024-09-05 RX ORDER — SODIUM CHLORIDE 9 MG/ML
40 INJECTION, SOLUTION INTRAVENOUS AS NEEDED
Status: DISCONTINUED | OUTPATIENT
Start: 2024-09-05 | End: 2024-09-08 | Stop reason: HOSPADM

## 2024-09-05 RX ORDER — SODIUM BICARBONATE 650 MG/1
650 TABLET ORAL 3 TIMES DAILY
Status: DISCONTINUED | OUTPATIENT
Start: 2024-09-05 | End: 2024-09-08 | Stop reason: HOSPADM

## 2024-09-05 RX ORDER — METOPROLOL SUCCINATE 50 MG/1
50 TABLET, EXTENDED RELEASE ORAL DAILY
Status: DISCONTINUED | OUTPATIENT
Start: 2024-09-06 | End: 2024-09-08 | Stop reason: HOSPADM

## 2024-09-05 RX ORDER — DEXTROMETHORPHAN POLISTIREX 30 MG/5ML
60 SUSPENSION ORAL EVERY 12 HOURS PRN
Status: DISCONTINUED | OUTPATIENT
Start: 2024-09-05 | End: 2024-09-08 | Stop reason: HOSPADM

## 2024-09-05 RX ORDER — AMOXICILLIN 250 MG
2 CAPSULE ORAL 2 TIMES DAILY PRN
Status: DISCONTINUED | OUTPATIENT
Start: 2024-09-05 | End: 2024-09-08 | Stop reason: HOSPADM

## 2024-09-05 RX ORDER — BISACODYL 5 MG/1
5 TABLET, DELAYED RELEASE ORAL DAILY PRN
Status: DISCONTINUED | OUTPATIENT
Start: 2024-09-05 | End: 2024-09-08 | Stop reason: HOSPADM

## 2024-09-05 RX ORDER — ASPIRIN 81 MG/1
81 TABLET ORAL DAILY
Status: DISCONTINUED | OUTPATIENT
Start: 2024-09-05 | End: 2024-09-08 | Stop reason: HOSPADM

## 2024-09-05 RX ORDER — BUMETANIDE 1 MG/1
0.5 TABLET ORAL DAILY
Status: DISCONTINUED | OUTPATIENT
Start: 2024-09-06 | End: 2024-09-08 | Stop reason: HOSPADM

## 2024-09-05 RX ORDER — DEXAMETHASONE SODIUM PHOSPHATE 10 MG/ML
6 INJECTION INTRAMUSCULAR; INTRAVENOUS DAILY
Status: DISCONTINUED | OUTPATIENT
Start: 2024-09-05 | End: 2024-09-08

## 2024-09-05 RX ORDER — ATORVASTATIN CALCIUM 10 MG/1
20 TABLET, FILM COATED ORAL NIGHTLY
Status: DISCONTINUED | OUTPATIENT
Start: 2024-09-05 | End: 2024-09-08 | Stop reason: HOSPADM

## 2024-09-05 RX ORDER — ACETAMINOPHEN 325 MG/1
650 TABLET ORAL EVERY 6 HOURS PRN
Status: DISCONTINUED | OUTPATIENT
Start: 2024-09-05 | End: 2024-09-08 | Stop reason: HOSPADM

## 2024-09-05 RX ORDER — FLUTICASONE PROPIONATE 50 MCG
1 SPRAY, SUSPENSION (ML) NASAL DAILY
Status: DISCONTINUED | OUTPATIENT
Start: 2024-09-06 | End: 2024-09-08 | Stop reason: HOSPADM

## 2024-09-05 RX ORDER — ISOSORBIDE MONONITRATE 30 MG/1
30 TABLET, EXTENDED RELEASE ORAL DAILY
Status: DISCONTINUED | OUTPATIENT
Start: 2024-09-06 | End: 2024-09-08 | Stop reason: HOSPADM

## 2024-09-05 RX ORDER — ONDANSETRON 2 MG/ML
4 INJECTION INTRAMUSCULAR; INTRAVENOUS EVERY 6 HOURS PRN
Status: DISCONTINUED | OUTPATIENT
Start: 2024-09-05 | End: 2024-09-08 | Stop reason: HOSPADM

## 2024-09-05 RX ORDER — TAMSULOSIN HYDROCHLORIDE 0.4 MG/1
0.4 CAPSULE ORAL NIGHTLY
Status: DISCONTINUED | OUTPATIENT
Start: 2024-09-05 | End: 2024-09-08 | Stop reason: HOSPADM

## 2024-09-05 RX ORDER — ACETAMINOPHEN 325 MG/1
650 TABLET ORAL EVERY 6 HOURS PRN
Status: DISCONTINUED | OUTPATIENT
Start: 2024-09-05 | End: 2024-09-05 | Stop reason: SDUPTHER

## 2024-09-05 RX ORDER — NITROGLYCERIN 0.4 MG/1
0.4 TABLET SUBLINGUAL
Status: DISCONTINUED | OUTPATIENT
Start: 2024-09-05 | End: 2024-09-08 | Stop reason: HOSPADM

## 2024-09-05 RX ORDER — SODIUM CHLORIDE 0.9 % (FLUSH) 0.9 %
10 SYRINGE (ML) INJECTION EVERY 12 HOURS SCHEDULED
Status: DISCONTINUED | OUTPATIENT
Start: 2024-09-05 | End: 2024-09-08 | Stop reason: HOSPADM

## 2024-09-05 RX ORDER — ENOXAPARIN SODIUM 100 MG/ML
30 INJECTION SUBCUTANEOUS DAILY
Status: DISCONTINUED | OUTPATIENT
Start: 2024-09-05 | End: 2024-09-08 | Stop reason: HOSPADM

## 2024-09-05 RX ADMIN — TAMSULOSIN HYDROCHLORIDE 0.4 MG: 0.4 CAPSULE ORAL at 20:10

## 2024-09-05 RX ADMIN — BUMETANIDE 2 MG: 0.25 INJECTION INTRAMUSCULAR; INTRAVENOUS at 16:50

## 2024-09-05 RX ADMIN — CEFEPIME 2000 MG: 2 INJECTION, POWDER, FOR SOLUTION INTRAVENOUS at 18:38

## 2024-09-05 RX ADMIN — SODIUM BICARBONATE 650 MG: 650 TABLET ORAL at 20:10

## 2024-09-05 RX ADMIN — ATORVASTATIN CALCIUM 20 MG: 10 TABLET, FILM COATED ORAL at 20:10

## 2024-09-05 RX ADMIN — Medication 10 ML: at 20:10

## 2024-09-05 RX ADMIN — ENOXAPARIN SODIUM 30 MG: 100 INJECTION SUBCUTANEOUS at 18:39

## 2024-09-05 RX ADMIN — DEXAMETHASONE SODIUM PHOSPHATE 6 MG: 10 INJECTION INTRAMUSCULAR; INTRAVENOUS at 18:39

## 2024-09-05 NOTE — TELEPHONE ENCOUNTER
RN reached out to patient to discuss his device clinic follow up appointments (s/p AVN ablation and leadless pacer placement by Dr. Bowen on 8.29.2024).  Patient reports that he is having worsening dyspnea on exertion the past two - three days.  He states he is normally on 2-3L O2 per [place] at home.  After walking to another room in the house (with his O2 on), his O2 saturation is dropping to 72-73% - It rebounds slowly up to 91-92% with rest.  Patient states it isn't atypical for him to be 85-90% after ambulation and that it typically improves with rest more quickly.  Patient contacted the oxygen company, who sent a rep to his home and verified his equipment is functioning normally.  RN noted that patient has documented lung cancer and recently had a PET scan that was suspicious for recurrent disease - No plans for biopsy as patient wants supportive care only, per Dr. Cool's note.  RN will forward to Dr. Bowen, in addition to Dr. Cool and MARITZA Gay.  Patient advised to seek care at the ED - He states he will contact a friend or his brother to drive him to ED.

## 2024-09-05 NOTE — ED PROVIDER NOTES
Subjective   History of Present Illness  Patient is an 84-year-old male with a history of atrial fibrillation, congestive heart failure and COPD who presents to the ER with shortness of air.  Patient states he has had shortness of air for the last 2 days along with a dry cough and bilateral leg swelling.  Patient does wear 3 L of oxygen at all times.  Patient has been taking Bumex but states he is still gaining weight.  He denies any fever, chest pain, abdominal pain, nausea vomiting diarrhea, urinary changes, neurologic changes.      Review of Systems   Constitutional:  Positive for unexpected weight change.   HENT: Negative.     Eyes: Negative.    Respiratory:  Positive for cough and shortness of breath.    Cardiovascular:  Positive for leg swelling.   Gastrointestinal: Negative.    Endocrine: Negative.    Genitourinary: Negative.    Musculoskeletal: Negative.    Skin: Negative.    Allergic/Immunologic: Negative.    Neurological: Negative.    Hematological: Negative.    Psychiatric/Behavioral: Negative.     All other systems reviewed and are negative.      Past Medical History:   Diagnosis Date    Arthritis     Atrial fibrillation with rapid ventricular response 05/31/2019    Blindness of left eye     BPH with obstruction/lower urinary tract symptoms     Cancer     stomach & lung    CHF (congestive heart failure)     CKD (chronic kidney disease) stage 3, GFR 30-59 ml/min     COPD with acute exacerbation 8/3/2024    Coronary artery disease     Elevated cholesterol     Essential hypertension 10/02/2017    Fibrotic lung diseases     GERD (gastroesophageal reflux disease)     Hearing loss     History of transfusion     Hydronephrosis of left kidney     Hyperlipidemia     Hypertension     pt was taken off of all of his medications for BP (atenolol, lisinopril, lasix) because his BP kept bottoming out so his primary dr told him to discontinue them 1-2 months ago (Jan/Feb 2019). pt states he takes no medications  currently.    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 Patel is just going to keep an eye on it and not do surgery right now.    Mediastinal adenopathy 10/24/2018    [facility]    Monoclonal gammopathy of unknown significance (MGUS) 09/11/2018    Pancreatic mass     pt states he had this in 2013 but it went away on its own. Now recent CT shows it has come back so he is going to have an ultrasound on 3/13/19.    Shortness of breath        Allergies   Allergen Reactions    Penicillins Hives       Past Surgical History:   Procedure Laterality Date    ABDOMINAL SURGERY      BRONCHOSCOPY N/A 10/24/2018    Procedure: BRONCHOSCOPY WITH BIOPSY, EBUS;  Surgeon: Gareth Becerra MD;  Location: [facility] OR;  Service: Pulmonary    CHOLECYSTECTOMY      COLONOSCOPY N/A 1/3/2019    Procedure: COLONOSCOPY WITH ANESTHESIA;  Surgeon: Randy Somers DO;  Location: [facility] ENDOSCOPY;  Service: Gastroenterology    CYSTOSCOPY, RETROGRADE PYELOGRAM AND STENT INSERTION Left 3/8/2019    Procedure: CYSTOSCOPY RETROGRADE BILATERAL PYELOGRAM;  Surgeon: Jos Sylvester MD;  Location: [facility] OR;  Service: Urology    ENDOSCOPY N/A 12/11/2018    Procedure: ESOPHAGOGASTRODUODENOSCOPY WITH ANESTHESIA;  Surgeon: Randy Somers DO;  Location: [facility] ENDOSCOPY;  Service: Gastroenterology    ENDOSCOPY N/A 4/29/2019    Procedure: ESOPHAGOGASTRODUODENOSCOPY WITH ANESTHESIA;  Surgeon: Lilliam Jj MD;  Location: [facility] OR;  Service: Gastroenterology    ENDOSCOPY N/A 5/9/2019    Procedure: ESOPHAGOGASTRODUODENOSCOPY WITH ANESTHESIA;  Surgeon: Pilo Bansal MD;  Location: [facility] ENDOSCOPY;  Service: Gastroenterology    EYE SURGERY Left 1964    FOOT SURGERY Right 1966    joint    FRACTURE SURGERY      PACEMAKER IMPLANTATION Left 8/29/2024    Procedure: Leadless pacemaker implant and  AVN ablation;  Surgeon: Carlitos Bowen MD;  Location:  [facility];  Service: Cardiovascular;  Laterality: Left;       Family History   Problem Relation Age of Onset    Hypertension Mother     Leukemia Father        Social History     Socioeconomic History    Marital status: Single   Tobacco Use    Smoking status: Former     Current packs/day: 0.00     Types: Cigarettes     Start date: 10/29/1954     Quit date: 10/29/2003     Years since quittin.8    Smokeless tobacco: Former     Types: Chew     Quit date:    Vaping Use    Vaping status: Never Used   Substance and Sexual Activity    Alcohol use: No    Drug use: No    Sexual activity: Defer           Objective   Physical Exam  Vitals and nursing note reviewed.   Constitutional:       Appearance: He is well-developed.   HENT:      Head: Normocephalic and atraumatic.   Eyes:      Conjunctiva/sclera: Conjunctivae normal.      Pupils: Pupils are equal, round, and reactive to light.   Cardiovascular:      Rate and Rhythm: Normal rate and regular rhythm.      Heart sounds: Normal heart sounds.   Pulmonary:      Effort: Pulmonary effort is normal.      Breath sounds: Normal breath sounds.   Abdominal:      Palpations: Abdomen is soft.      Tenderness: There is no abdominal tenderness.   Musculoskeletal:         General: No deformity. Normal range of motion.      Cervical back: Normal range of motion.   Skin:     General: Skin is warm.      Comments: 1+ pitting edema bilateral lower extremities   Neurological:      Mental Status: He is alert and oriented to person, place, and time.   Psychiatric:         Behavior: Behavior normal.         Procedures           ED Course      EKG as interpreted by me: Paced rhythm with a rate of 92 with PACs, no acute ischemia or infarction                               Lab Results (last 24 hours)       Procedure Component Value Units Date/Time    Blood Gas, Arterial With Co-Ox [[id number]]  (Abnormal) Collected:  09/05/24 1445    Specimen: Arterial Blood Updated: 09/05/24 1444     Site Right Radial     Gregg's Test Positive     pH, Arterial 7.458 pH units      Comment: 83 Value above reference range        pCO2, Arterial 34.4 mm Hg      Comment: 84 Value below reference range        pO2, Arterial 63.3 mm Hg      Comment: 84 Value below reference range        HCO3, Arterial 24.3 mmol/L      Base Excess, Arterial 0.8 mmol/L      O2 Saturation, Arterial 93.0 %      Comment: 84 Value below reference range        Hemoglobin, Blood Gas 11.0 g/dL      Comment: 84 Value below reference range        Hematocrit, Blood Gas 33.6 %      Comment: 84 Value below reference range        Oxyhemoglobin 91.3 %      Comment: 84 Value below reference range        Methemoglobin 0.40 %      Carboxyhemoglobin 1.4 %      Temperature 37.0     Sodium, Arterial 138 mmol/L      Potassium, Arterial 3.9 mmol/L      Ionized Calcium 4.91 mg/dL      Barometric Pressure for Blood Gas 756 mmHg      Modality Nasal Cannula     Flow Rate 3.0 lpm      Ventilator Mode NA     Collected by [id number]     Comment: Meter: [id number]     :  Jatin Smith, GAYE        pH, Temp Corrected 7.458 pH Units      pCO2, Temperature Corrected 34.4 mm Hg      pO2, Temperature Corrected 63.3 mm Hg     CBC & Differential [[id number]]  (Abnormal) Collected: 09/05/24 1450    Specimen: Blood Updated: 09/05/24 1516    Narrative:      The following orders were created for panel order CBC & Differential.  Procedure                               Abnormality         Status                     ---------                               -----------         ------                     CBC Auto Differential[[id number]]        Abnormal            Final result                 Please view results for these tests on the individual orders.    Comprehensive Metabolic Panel [[id number]]  (Abnormal) Collected: 09/05/24 1450    Specimen: Blood Updated: 09/05/24 1536     Glucose 113 mg/dL      BUN 28  mg/dL      Creatinine 2.36 mg/dL      Sodium 136 mmol/L      Potassium 4.2 mmol/L      Chloride 103 mmol/L      CO2 24.0 mmol/L      Calcium 9.3 mg/dL      Total Protein 6.5 g/dL      Albumin 3.1 g/dL      ALT (SGPT) 32 U/L      AST (SGOT) 32 U/L      Alkaline Phosphatase 101 U/L      Total Bilirubin 0.4 mg/dL      Globulin 3.4 gm/dL      A/G Ratio 0.9 g/dL      BUN/Creatinine Ratio 11.9     Anion Gap 9.0 mmol/L      eGFR 26.8 mL/min/1.73     Narrative:      GFR Normal >60  Chronic Kidney Disease <60  Kidney Failure <15    The GFR formula is only valid for adults with stable renal function between ages 18 and 70.    BNP [[id number]]  (Abnormal) Collected: 09/05/24 1450    Specimen: Blood Updated: 09/05/24 1533     proBNP 4,734.0 pg/mL     Narrative:      This assay is used as an aid in the diagnosis of individuals suspected of having heart failure. It can be used as an aid in the diagnosis of acute decompensated heart failure (ADHF) in patients presenting with signs and symptoms of ADHF to the emergency department (ED). In addition, NT-proBNP of <300 pg/mL indicates ADHF is not likely.    Age Range Result Interpretation  NT-proBNP Concentration (pg/mL:      <50             Positive            >450                   Gray                 300-450                    Negative             <300    50-75           Positive            >900                  Gray                300-900                  Negative            <300      >75             Positive            >1800                  Gray                300-1800                  Negative            <300    Lactic Acid, Plasma [[id number]]  (Normal) Collected: 09/05/24 1450    Specimen: Blood Updated: 09/05/24 1534     Lactate 1.6 mmol/L     CBC Auto Differential [[id number]]  (Abnormal) Collected: 09/05/24 1450    Specimen: Blood Updated: 09/05/24 1516     WBC 6.35 10*3/mm3      RBC 3.56 10*6/mm3      Hemoglobin 10.6 g/dL      Hematocrit 34.8 %      MCV 97.8 fL      MCH  29.8 pg      MCHC 30.5 g/dL      RDW 16.4 %      RDW-SD 57.9 fl      MPV 11.3 fL      Platelets 225 10*3/mm3      Neutrophil % 73.6 %      Lymphocyte % 11.3 %      Monocyte % 11.7 %      Eosinophil % 1.7 %      Basophil % 0.3 %      Immature Grans % 1.4 %      Neutrophils, Absolute 4.67 10*3/mm3      Lymphocytes, Absolute 0.72 10*3/mm3      Monocytes, Absolute 0.74 10*3/mm3      Eosinophils, Absolute 0.11 10*3/mm3      Basophils, Absolute 0.02 10*3/mm3      Immature Grans, Absolute 0.09 10*3/mm3      nRBC 0.0 /100 WBC     Blood Culture - Blood, Arm, Right [[id number]] Collected: 09/05/24 1451    Specimen: Blood from Arm, Right Updated: 09/05/24 1514    Blood Culture - Blood, Arm, Right [[id number]] Collected: 09/05/24 1455    Specimen: Blood from Arm, Right Updated: 09/05/24 1514    COVID-19,CEPHEID/[facility] IN-HOUSE,NP SWAB IN TRANSPORT MEDIA 1 HR TAT, RT-PCR - Swab, Nasopharynx [[id number]]  (Abnormal) Collected: 09/05/24 1458    Specimen: Swab from Nasopharynx Updated: 09/05/24 1544     COVID19 Detected    Narrative:      Fact sheet for providers: https://www.fda.gov/media/936700/download     Fact sheet for patients: https://www.fda.gov/media/487623/download  Fact sheet for providers: https://www.fda.gov/media/696675/download     Fact sheet for patients: https://www.fda.gov/media/759939/download           XR Chest 1 View   Final Result   1. Low lung volumes with increased interstitial prominence compatible   with pulmonary edema superimposed on chronic change.               This report was signed and finalized on 9/5/2024 3:22 PM by Dr. Danilo Sebastian MD.                       Medical Decision Making  Patient is an 84-year-old male with a history of atrial fibrillation, congestive heart failure and COPD who presents to the ER with shortness of air.  Patient states he has had shortness of air for the last 2 days along with a dry cough and bilateral leg swelling.  Patient does wear 3 L of oxygen  at all times.  Patient has been taking Bumex but states he is still gaining weight.  He denies any fever, chest pain, abdominal pain, nausea vomiting diarrhea, urinary changes, neurologic changes.    Differential diagnosis: COPD exacerbation, CHF exacerbation, pneumonia, viral syndrome    Patient arrived hypoxic but improved on nasal cannula.  Labs showed an elevated BUN and creatinine consistent with the patient's chronic renal insufficiency.  Labs also showed an elevated BNP.  Viral swab was positive for COVID.  Chest x-ray showed low lung volumes with increased interstitial prominence compatible with pulmonary edema superimposed on chronic change.  Patient was given a dose of Bumex.  I then discussed the case with Meeta Romero with the hospitalist service and patient was admitted to Dr. Stewart's team for further treatment.    Problems Addressed:  Acute on chronic congestive heart failure, unspecified heart failure type: complicated acute illness or injury  Chronic renal impairment, unspecified CKD stage: chronic illness or injury  COVID-19: complicated acute illness or injury  Hypoxia: complicated acute illness or injury    Amount and/or Complexity of Data Reviewed  Labs: ordered. Decision-making details documented in ED Course.  Radiology: ordered. Decision-making details documented in ED Course.  ECG/medicine tests: ordered. Decision-making details documented in ED Course.  Discussion of management or test interpretation with external provider(s): Meeta Romero with the hospitalist service    Risk  Prescription drug management.  Decision regarding hospitalization.        Final diagnoses:   Acute on chronic congestive heart failure, unspecified heart failure type   Hypoxia   Chronic renal impairment, unspecified CKD stage   COVID-19       ED Disposition  ED Disposition       ED Disposition   Decision to Admit    Condition   --    Comment   Level of Care: Telemetry [5]   Diagnosis: CHF exacerbation  [[id number]]   Admitting Physician: FRANK MARQUEZ [[id number]]   Attending Physician: FRANK MARQUEZ [[id number]]   Certification: I Certify That Inpatient Hospital Services Are Medically Necessary For Greater Than 2 Midnights                 No follow-up provider specified.       Medication List      No changes were made to your prescriptions during this visit.            Kristen Rojas MD  09/05/24 [id number]

## 2024-09-05 NOTE — H&P
[facility] Medicine Services  HISTORY AND PHYSICAL    Date of Admission: 9/5/2024  Primary Care Physician: Irving Alexis MD    Subjective   Primary Historian: Patient    Chief Complaint: Shortness of breath and hypoxemia    History of Present Illness  Patient is an 84-year-old  male with past medical history significant for recurrent lung cancer, chronic kidney disease, chronic obstructive pulmonary disease, history of atrial fibrillation/atrial flutter with pacemaker, the presented to our facility with a chief complaint of hypoxemia and shortness of breath.  He reports that those symptoms have been ongoing for the past 2 days.  He does report having a little bit of a sore throat, and reports a cough that for the most part has been nonproductive.  He states this is the first time that he has had COVID, and it sounds like received 1 vaccination a number of years ago with the [employer] vaccine, but no booster since that time.  He normally utilizes 3 L by nasal cannula at home, and has noticed that with activity his O2 sats will drop down into the low 80s and upper 70s.  He denies any chest pain or chest pressure.  He does report having chronic lower extremity edema that he states is no different as compared to his norm.  He denies any orthopnea or PND.    Review of Systems   Otherwise complete ROS reviewed and negative except as mentioned in the HPI.    Past Medical History:   Past Medical History:   Diagnosis Date    Arthritis     Atrial fibrillation with rapid ventricular response 05/31/2019    Blindness of left eye     BPH with obstruction/lower urinary tract symptoms     Cancer     stomach & lung    CHF (congestive heart failure)     CKD (chronic kidney disease) stage 3, GFR 30-59 ml/min     COPD with acute exacerbation 8/3/2024    Coronary artery disease     Elevated cholesterol     Essential hypertension 10/02/2017    Fibrotic lung diseases     GERD  (gastroesophageal reflux disease)     Hearing loss     History of transfusion     Hydronephrosis of left kidney     Hyperlipidemia     Hypertension     pt was taken off of all of his medications for BP (atenolol, lisinopril, lasix) because his BP kept bottoming out so his primary dr told him to discontinue them 1-2 months ago (Jan/Feb 2019). pt states he takes no medications currently.    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 Patel is just going to keep an eye on it and not do surgery right now.    Mediastinal adenopathy 10/24/2018    Station [facility]    Monoclonal gammopathy of unknown significance (MGUS) 09/11/2018    Pancreatic mass     pt states he had this in 2013 but it went away on its own. Now recent CT shows it has come back so he is going to have an ultrasound on 3/13/19.    Shortness of breath      Past Surgical History:  Past Surgical History:   Procedure Laterality Date    ABDOMINAL SURGERY      BRONCHOSCOPY N/A 10/24/2018    Procedure: BRONCHOSCOPY WITH BIOPSY, EBUS;  Surgeon: Gareth Becerra MD;  Location: [facility] OR;  Service: Pulmonary    CHOLECYSTECTOMY      COLONOSCOPY N/A 1/3/2019    Procedure: COLONOSCOPY WITH ANESTHESIA;  Surgeon: Randy Somers DO;  Location: [facility] ENDOSCOPY;  Service: Gastroenterology    CYSTOSCOPY, RETROGRADE PYELOGRAM AND STENT INSERTION Left 3/8/2019    Procedure: CYSTOSCOPY RETROGRADE BILATERAL PYELOGRAM;  Surgeon: Jos Sylvester MD;  Location: [facility] OR;  Service: Urology    ENDOSCOPY N/A 12/11/2018    Procedure: ESOPHAGOGASTRODUODENOSCOPY WITH ANESTHESIA;  Surgeon: Randy Somers DO;  Location: [facility] ENDOSCOPY;  Service: Gastroenterology    ENDOSCOPY N/A 4/29/2019    Procedure: ESOPHAGOGASTRODUODENOSCOPY WITH ANESTHESIA;  Surgeon: Lilliam Jj MD;  Location: [facility] OR;  Service: Gastroenterology     ENDOSCOPY N/A 5/9/2019    Procedure: ESOPHAGOGASTRODUODENOSCOPY WITH ANESTHESIA;  Surgeon: Pilo Bansal MD;  Location: [facility] ENDOSCOPY;  Service: Gastroenterology    EYE SURGERY Left 1964    FOOT SURGERY Right 1966    joint    FRACTURE SURGERY      PACEMAKER IMPLANTATION Left 8/29/2024    Procedure: Leadless pacemaker implant and AVN ablation;  Surgeon: Carlitos Bowen MD;  Location: [facility] CATH INVASIVE LOCATION;  Service: Cardiovascular;  Laterality: Left;     Social History:  reports that he quit smoking about 20 years ago. His smoking use included cigarettes. He started smoking about 69 years ago. He quit smokeless tobacco use about 54 years ago.  His smokeless tobacco use included chew. He reports that he does not drink alcohol and does not use drugs.    Family History: family history includes Hypertension in his mother; Leukemia in his father.       Allergies:  Allergies   Allergen Reactions    Penicillins Hives       Medications:  Prior to Admission medications    Medication Sig Start Date End Date Taking? Authorizing Provider   acetaminophen (TYLENOL) 500 MG tablet Take 2 tablets by mouth Every Night.    Provider, MD Eliecer   albuterol sulfate  (90 Base) MCG/ACT inhaler Inhale 2 puffs Every 4 (Four) Hours As Needed for Wheezing.  Patient not taking: Reported on 9/3/2024    Provider, MD Eliecer   aspirin 81 MG EC tablet Take 1 tablet by mouth Daily. 7/4/24   Zachary Lloyd MD   atorvastatin (LIPITOR) 20 MG tablet Take 1 tablet by mouth Every Night. 7/3/24   Zachary Lloyd MD   bumetanide (BUMEX) 0.5 MG tablet Take 1 tablet by mouth Daily As Needed (2 LB's overnight or 3 LB's in a week.  New onset of Shortness of breath or increasing bilateral lower extremity edema) for up to 90 days. 8/30/24 11/28/24  , MARITZA Iqbal   cetirizine (zyrTEC) 10 MG tablet Take 1 tablet by mouth Daily. 8/8/24   Cornelio Gordon DO   fluticasone (FLONASE) 50 MCG/ACT nasal spray 1 spray into  "the nostril(s) as directed by provider Daily.    Eliecer Schultz MD   isosorbide mononitrate (IMDUR) 30 MG 24 hr tablet Take 1 tablet by mouth Daily. Takes before lunch    Eliecer Schultz MD   melatonin 5 MG tablet tablet Take 2 tablets by mouth Every Night.    Eliecer Schultz MD   metoprolol succinate XL (Toprol XL) 50 MG 24 hr tablet Take 1 tablet by mouth Daily. 7/4/24   Zachary Lloyd MD   montelukast (SINGULAIR) 10 MG tablet Take 1 tablet by mouth Every Night.  Patient not taking: Reported on 9/3/2024    Eliecer Schultz MD   multivitamin with minerals tablet tablet Take 1 tablet by mouth Daily.    Eliecer Schultz MD   pantoprazole (PROTONIX) 40 MG EC tablet Take 1 tablet by mouth Daily. 4/30/19   Hang Reddy DO   sodium bicarbonate 650 MG tablet Take 1 tablet by mouth 3 (Three) Times a Day.    Eliecer Schultz MD   tamsulosin (FLOMAX) 0.4 MG capsule 24 hr capsule Take 1 capsule by mouth Every Night.    Eliecer Schultz MD   tiotropium bromide-olodaterol (Stiolto Respimat) 2.5-2.5 MCG/ACT aerosol solution inhaler Inhale 2 puffs Daily. 8/8/24   Cornelio Gordon DO     I have utilized all available immediate resources to obtain, update, or review the patient's current medications (including all prescriptions, over-the-counter products, herbals, cannabis/cannabidiol products, and vitamin/mineral/dietary (nutritional) supplements).    Objective     Vital Signs: /80   Pulse 90   Temp 97.9 °F (36.6 °C) (Oral)   Resp 16   Ht 162.6 cm (64\")   Wt 76.7 kg (169 lb)   SpO2 99%   BMI 29.01 kg/m²   Physical Exam  Vitals reviewed.   Constitutional:       General: He is not in acute distress.     Appearance: He is ill-appearing.   HENT:      Head: Normocephalic.      Mouth/Throat:      Comments: Wearing facemask  Eyes:      Pupils: Pupils are equal, round, and reactive to light.   Cardiovascular:      Rate and Rhythm: Normal rate.   Pulmonary:      Effort: " Pulmonary effort is normal.      Breath sounds: Rhonchi present. No wheezing.      Comments: Crackly scattered breath sounds noted; R>L; on supp 02  via nasal cannula  Abdominal:      Palpations: Abdomen is soft.   Musculoskeletal:      Right lower leg: Edema present.      Left lower leg: Edema present.   Skin:     General: Skin is warm.   Neurological:      Mental Status: He is alert.      Motor: Weakness present.   Psychiatric:         Mood and Affect: Mood normal.          Results Reviewed:  Lab Results (last 24 hours)       Procedure Component Value Units Date/Time    COVID-19,CEPHEID/JENN,COR/BRYAN/PAD/YOUNG/LAG/ASHER IN-HOUSE,NP SWAB IN TRANSPORT MEDIA 1 HR TAT, RT-PCR - Swab, Nasopharynx [[id number]]  (Abnormal) Collected: 09/05/24 1458    Specimen: Swab from Nasopharynx Updated: 09/05/24 1544     COVID19 Detected    Narrative:      Fact sheet for providers: https://www.fda.gov/media/934830/download     Fact sheet for patients: https://www.fda.gov/media/938148/download  Fact sheet for providers: https://www.fda.gov/media/863884/download     Fact sheet for patients: https://www.fda.gov/media/544175/download    Comprehensive Metabolic Panel [[id number]]  (Abnormal) Collected: 09/05/24 1450    Specimen: Blood Updated: 09/05/24 1536     Glucose 113 mg/dL      BUN 28 mg/dL      Creatinine 2.36 mg/dL      Sodium 136 mmol/L      Potassium 4.2 mmol/L      Chloride 103 mmol/L      CO2 24.0 mmol/L      Calcium 9.3 mg/dL      Total Protein 6.5 g/dL      Albumin 3.1 g/dL      ALT (SGPT) 32 U/L      AST (SGOT) 32 U/L      Alkaline Phosphatase 101 U/L      Total Bilirubin 0.4 mg/dL      Globulin 3.4 gm/dL      A/G Ratio 0.9 g/dL      BUN/Creatinine Ratio 11.9     Anion Gap 9.0 mmol/L      eGFR 26.8 mL/min/1.73     Narrative:      GFR Normal >60  Chronic Kidney Disease <60  Kidney Failure <15    The GFR formula is only valid for adults with stable renal function between ages 18 and 70.    Lactic Acid, Plasma [[id number]]  (Normal)  Collected: 09/05/24 1450    Specimen: Blood Updated: 09/05/24 1534     Lactate 1.6 mmol/L     BNP [[id number]]  (Abnormal) Collected: 09/05/24 1450    Specimen: Blood Updated: 09/05/24 1533     proBNP 4,734.0 pg/mL     Narrative:      This assay is used as an aid in the diagnosis of individuals suspected of having heart failure. It can be used as an aid in the diagnosis of acute decompensated heart failure (ADHF) in patients presenting with signs and symptoms of ADHF to the emergency department (ED). In addition, NT-proBNP of <300 pg/mL indicates ADHF is not likely.    Age Range Result Interpretation  NT-proBNP Concentration (pg/mL:      <50             Positive            >450                   Gray                 300-450                    Negative             <300    50-75           Positive            >900                  Gray                300-900                  Negative            <300      >75             Positive            >1800                  Gray                300-1800                  Negative            <300    CBC & Differential [[id number]]  (Abnormal) Collected: 09/05/24 1450    Specimen: Blood Updated: 09/05/24 1516    Narrative:      The following orders were created for panel order CBC & Differential.  Procedure                               Abnormality         Status                     ---------                               -----------         ------                     CBC Auto Differential[[id number]]        Abnormal            Final result                 Please view results for these tests on the individual orders.    CBC Auto Differential [[id number]]  (Abnormal) Collected: 09/05/24 1450    Specimen: Blood Updated: 09/05/24 1516     WBC 6.35 10*3/mm3      RBC 3.56 10*6/mm3      Hemoglobin 10.6 g/dL      Hematocrit 34.8 %      MCV 97.8 fL      MCH 29.8 pg      MCHC 30.5 g/dL      RDW 16.4 %      RDW-SD 57.9 fl      MPV 11.3 fL      Platelets 225 10*3/mm3      Neutrophil % 73.6 %       Lymphocyte % 11.3 %      Monocyte % 11.7 %      Eosinophil % 1.7 %      Basophil % 0.3 %      Immature Grans % 1.4 %      Neutrophils, Absolute 4.67 10*3/mm3      Lymphocytes, Absolute 0.72 10*3/mm3      Monocytes, Absolute 0.74 10*3/mm3      Eosinophils, Absolute 0.11 10*3/mm3      Basophils, Absolute 0.02 10*3/mm3      Immature Grans, Absolute 0.09 10*3/mm3      nRBC 0.0 /100 WBC     Blood Culture - Blood, Arm, Right [[id number]] Collected: 09/05/24 1455    Specimen: Blood from Arm, Right Updated: 09/05/24 1514    Blood Culture - Blood, Arm, Right [[id number]] Collected: 09/05/24 1451    Specimen: Blood from Arm, Right Updated: 09/05/24 1514    Blood Gas, Arterial With Co-Ox [[id number]]  (Abnormal) Collected: 09/05/24 1445    Specimen: Arterial Blood Updated: 09/05/24 1444     Site Right Radial     Gregg's Test Positive     pH, Arterial 7.458 pH units      Comment: 83 Value above reference range        pCO2, Arterial 34.4 mm Hg      Comment: 84 Value below reference range        pO2, Arterial 63.3 mm Hg      Comment: 84 Value below reference range        HCO3, Arterial 24.3 mmol/L      Base Excess, Arterial 0.8 mmol/L      O2 Saturation, Arterial 93.0 %      Comment: 84 Value below reference range        Hemoglobin, Blood Gas 11.0 g/dL      Comment: 84 Value below reference range        Hematocrit, Blood Gas 33.6 %      Comment: 84 Value below reference range        Oxyhemoglobin 91.3 %      Comment: 84 Value below reference range        Methemoglobin 0.40 %      Carboxyhemoglobin 1.4 %      Temperature 37.0     Sodium, Arterial 138 mmol/L      Potassium, Arterial 3.9 mmol/L      Ionized Calcium 4.91 mg/dL      Barometric Pressure for Blood Gas 756 mmHg      Modality Nasal Cannula     Flow Rate 3.0 lpm      Ventilator Mode NA     Collected by [id number]     Comment: Meter: [id number]     :  Jatin Smith, GAYE        pH, Temp Corrected 7.458 pH Units      pCO2, Temperature Corrected 34.4 mm Hg      pO2,  Temperature Corrected 63.3 mm Hg           Imaging Results (Last 24 Hours)       Procedure Component Value Units Date/Time    XR Chest 1 View [[id number]] Collected: 09/05/24 1520     Updated: 09/05/24 1525    Narrative:      EXAMINATION: XR CHEST 1 VW-     9/5/2024 2:12 PM     HISTORY: Shortness of breath.     1 view chest x-ray.     COMPARISON:  8/28/2024 2 view chest x-ray.  8/3/2024 chest CT.     Diffuse interstitial lung disease.     RIGHT lung nodules. There is a known history of lung carcinoma.     Increased interstitial prominence compatible with worsening interstitial  edema.     Magnified heart size.  Probable cardiac enlargement.     A RIGHT chest wall port is present.       Impression:      1. Low lung volumes with increased interstitial prominence compatible  with pulmonary edema superimposed on chronic change.           This report was signed and finalized on 9/5/2024 3:22 PM by Dr. Danilo Sebastian MD.             I have personally reviewed and interpreted the radiology studies and ECG obtained at time of admission.     Assessment / Plan   Assessment:   Active Hospital Problems    Diagnosis     COVID-19 virus infection     Chronic diastolic congestive heart failure     COPD (chronic obstructive pulmonary disease)     Bilateral lower extremity edema     Former smoker     Pneumonia due to infectious organism     Acute on chronic respiratory failure with hypoxia     Pulmonary fibrosis     Stage 3b chronic kidney disease     Malignant neoplasm of upper lobe of right lung        Treatment Plan  Start Dexamethasone  Supplemental oxygen; patient normally on 3LNC chronically in the outpatient setting  Pulmonary consultation  CXR personally reviewed - patient given a dose of IV Bumex in the ED.  I am also going to add empiric IV Cefepime      5.  Albuterol and Atrovent HFA  6.  Add CRP, procalcitonin, ferritin  7.  Pacemaker interrogation  8.  Echocardiogram from July 2024 as follows:    9.  Check MRSA PCR,  respiratory culture, strep pneumo Ag  10.  Incentive spirometry  11.  Monitor closely to determine consideration for Remdesivir or Paxlovid.  His renal insufficiency must be accounted for in this circumstance.  12.  Repeat labs including metabolic panel in AM  13.  Lovenox PPx  14.  Workup ongoing    Medical Decision Making  Number and Complexity of problems: 3 acute; high complexity      Conditions and Status        Condition is worsening.     [facility] Data  External documents reviewed: outpatient note from Dr. Cool reviewed regarding suspected recurrence of lung CA  Cardiac tracing (EKG, telemetry) interpretation: paced at ~90bpm  Radiology interpretation: see above  Labs reviewed: as above  Any tests that were considered but not ordered: CT Chest     Decision rules/scores evaluated (example [id number], Wells, etc): none     Discussed with: patient     Care Planning  Shared decision making: Discussed with patient with agreement to proceed with treatment plan as outlined  Code status and discussions: DO NOT RESUSCITATE    Disposition  Social Determinants of Health that impact treatment or disposition: None apparent at this time  Estimated length of stay is 2 days or greater    I confirmed that the patient's advanced care plan is present, code status is documented, and a surrogate decision maker is listed in the patient's medical record.     The patient's surrogate decision maker is his brotherKeshav        Electronically signed by Prasanth Stewart MD, 09/05/24, 16:54 CDT.

## 2024-09-05 NOTE — ED NOTES
Page placed to [employer] rep due to we have not received fax nor phone call regarding the interrogation of patients pacemaker done around 15:00. They will have local rep call us.

## 2024-09-06 LAB
ALBUMIN SERPL-MCNC: 2.8 G/DL (ref 3.5–5.2)
ALBUMIN/GLOB SERPL: 0.9 G/DL
ALP SERPL-CCNC: 86 U/L (ref 39–117)
ALT SERPL W P-5'-P-CCNC: 26 U/L (ref 1–41)
ANION GAP SERPL CALCULATED.3IONS-SCNC: 11 MMOL/L (ref 5–15)
AST SERPL-CCNC: 27 U/L (ref 1–40)
BASOPHILS # BLD AUTO: 0.01 10*3/MM3 (ref 0–0.2)
BASOPHILS NFR BLD AUTO: 0.3 % (ref 0–1.5)
BILIRUB SERPL-MCNC: 0.5 MG/DL (ref 0–1.2)
BUN SERPL-MCNC: 31 MG/DL (ref 8–23)
BUN/CREAT SERPL: 14.6 (ref 7–25)
CALCIUM SPEC-SCNC: 9.3 MG/DL (ref 8.6–10.5)
CHLORIDE SERPL-SCNC: 102 MMOL/L (ref 98–107)
CO2 SERPL-SCNC: 24 MMOL/L (ref 22–29)
CREAT SERPL-MCNC: 2.13 MG/DL (ref 0.76–1.27)
CRP SERPL-MCNC: 6.4 MG/DL (ref 0–0.5)
DEPRECATED RDW RBC AUTO: 56.9 FL (ref 37–54)
EGFRCR SERPLBLD CKD-EPI 2021: 30.3 ML/MIN/1.73
EOSINOPHIL # BLD AUTO: 0 10*3/MM3 (ref 0–0.4)
EOSINOPHIL NFR BLD AUTO: 0 % (ref 0.3–6.2)
ERYTHROCYTE [DISTWIDTH] IN BLOOD BY AUTOMATED COUNT: 16.2 % (ref 12.3–15.4)
FERRITIN SERPL-MCNC: 2555 NG/ML (ref 30–400)
GLOBULIN UR ELPH-MCNC: 3.1 GM/DL
GLUCOSE BLDC GLUCOMTR-MCNC: 164 MG/DL (ref 70–130)
GLUCOSE SERPL-MCNC: 139 MG/DL (ref 65–99)
HCT VFR BLD AUTO: 32.4 % (ref 37.5–51)
HGB BLD-MCNC: 10.1 G/DL (ref 13–17.7)
IMM GRANULOCYTES # BLD AUTO: 0.03 10*3/MM3 (ref 0–0.05)
IMM GRANULOCYTES NFR BLD AUTO: 0.8 % (ref 0–0.5)
LYMPHOCYTES # BLD AUTO: 0.49 10*3/MM3 (ref 0.7–3.1)
LYMPHOCYTES NFR BLD AUTO: 13.1 % (ref 19.6–45.3)
MCH RBC QN AUTO: 29.7 PG (ref 26.6–33)
MCHC RBC AUTO-ENTMCNC: 31.2 G/DL (ref 31.5–35.7)
MCV RBC AUTO: 95.3 FL (ref 79–97)
MONOCYTES # BLD AUTO: 0.28 10*3/MM3 (ref 0.1–0.9)
MONOCYTES NFR BLD AUTO: 7.5 % (ref 5–12)
MRSA DNA SPEC QL NAA+PROBE: NORMAL
NEUTROPHILS NFR BLD AUTO: 2.94 10*3/MM3 (ref 1.7–7)
NEUTROPHILS NFR BLD AUTO: 78.3 % (ref 42.7–76)
NRBC BLD AUTO-RTO: 0 /100 WBC (ref 0–0.2)
PLATELET # BLD AUTO: 224 10*3/MM3 (ref 140–450)
PMV BLD AUTO: 11 FL (ref 6–12)
POTASSIUM SERPL-SCNC: 4.6 MMOL/L (ref 3.5–5.2)
PROT SERPL-MCNC: 5.9 G/DL (ref 6–8.5)
QT INTERVAL: 404 MS
QTC INTERVAL: 499 MS
RBC # BLD AUTO: 3.4 10*6/MM3 (ref 4.14–5.8)
SODIUM SERPL-SCNC: 137 MMOL/L (ref 136–145)
WBC NRBC COR # BLD AUTO: 3.75 10*3/MM3 (ref 3.4–10.8)

## 2024-09-06 PROCEDURE — 99222 1ST HOSP IP/OBS MODERATE 55: CPT | Performed by: INTERNAL MEDICINE

## 2024-09-06 PROCEDURE — 82948 REAGENT STRIP/BLOOD GLUCOSE: CPT

## 2024-09-06 PROCEDURE — 85025 COMPLETE CBC W/AUTO DIFF WBC: CPT | Performed by: INTERNAL MEDICINE

## 2024-09-06 PROCEDURE — 94640 AIRWAY INHALATION TREATMENT: CPT

## 2024-09-06 PROCEDURE — 82728 ASSAY OF FERRITIN: CPT | Performed by: INTERNAL MEDICINE

## 2024-09-06 PROCEDURE — 94799 UNLISTED PULMONARY SVC/PX: CPT

## 2024-09-06 PROCEDURE — 25810000003 SODIUM CHLORIDE 0.9 % SOLUTION 250 ML FLEX CONT: Performed by: INTERNAL MEDICINE

## 2024-09-06 PROCEDURE — XW033E5 INTRODUCTION OF REMDESIVIR ANTI-INFECTIVE INTO PERIPHERAL VEIN, PERCUTANEOUS APPROACH, NEW TECHNOLOGY GROUP 5: ICD-10-PCS | Performed by: INTERNAL MEDICINE

## 2024-09-06 PROCEDURE — 25010000002 DEXAMETHASONE PER 1 MG: Performed by: INTERNAL MEDICINE

## 2024-09-06 PROCEDURE — 86140 C-REACTIVE PROTEIN: CPT | Performed by: INTERNAL MEDICINE

## 2024-09-06 PROCEDURE — 80053 COMPREHEN METABOLIC PANEL: CPT | Performed by: INTERNAL MEDICINE

## 2024-09-06 PROCEDURE — 97166 OT EVAL MOD COMPLEX 45 MIN: CPT | Performed by: OCCUPATIONAL THERAPIST

## 2024-09-06 PROCEDURE — 94760 N-INVAS EAR/PLS OXIMETRY 1: CPT

## 2024-09-06 PROCEDURE — 25010000002 CEFEPIME PER 500 MG: Performed by: INTERNAL MEDICINE

## 2024-09-06 PROCEDURE — 25010000002 BUMETANIDE PER 0.5 MG: Performed by: INTERNAL MEDICINE

## 2024-09-06 PROCEDURE — 25010000002 ENOXAPARIN PER 10 MG: Performed by: INTERNAL MEDICINE

## 2024-09-06 PROCEDURE — 25010000002 REMDESIVIR 100 MG/20ML SOLUTION 1 EACH VIAL: Performed by: INTERNAL MEDICINE

## 2024-09-06 RX ORDER — BUMETANIDE 0.25 MG/ML
1 INJECTION INTRAMUSCULAR; INTRAVENOUS ONCE
Status: COMPLETED | OUTPATIENT
Start: 2024-09-06 | End: 2024-09-06

## 2024-09-06 RX ADMIN — Medication 10 ML: at 20:37

## 2024-09-06 RX ADMIN — ISOSORBIDE MONONITRATE 30 MG: 30 TABLET, EXTENDED RELEASE ORAL at 08:32

## 2024-09-06 RX ADMIN — ASPIRIN 81 MG: 81 TABLET, COATED ORAL at 08:31

## 2024-09-06 RX ADMIN — TAMSULOSIN HYDROCHLORIDE 0.4 MG: 0.4 CAPSULE ORAL at 20:36

## 2024-09-06 RX ADMIN — SODIUM BICARBONATE 650 MG: 650 TABLET ORAL at 16:03

## 2024-09-06 RX ADMIN — BUMETANIDE 0.5 MG: 1 TABLET ORAL at 08:32

## 2024-09-06 RX ADMIN — IPRATROPIUM BROMIDE 2 PUFF: 17 AEROSOL, METERED RESPIRATORY (INHALATION) at 10:20

## 2024-09-06 RX ADMIN — IPRATROPIUM BROMIDE 2 PUFF: 17 AEROSOL, METERED RESPIRATORY (INHALATION) at 06:16

## 2024-09-06 RX ADMIN — SODIUM BICARBONATE 650 MG: 650 TABLET ORAL at 20:36

## 2024-09-06 RX ADMIN — IPRATROPIUM BROMIDE 2 PUFF: 17 AEROSOL, METERED RESPIRATORY (INHALATION) at 14:20

## 2024-09-06 RX ADMIN — SODIUM BICARBONATE 650 MG: 650 TABLET ORAL at 08:31

## 2024-09-06 RX ADMIN — PANTOPRAZOLE SODIUM 40 MG: 40 TABLET, DELAYED RELEASE ORAL at 08:32

## 2024-09-06 RX ADMIN — ATORVASTATIN CALCIUM 20 MG: 10 TABLET, FILM COATED ORAL at 20:36

## 2024-09-06 RX ADMIN — ENOXAPARIN SODIUM 30 MG: 100 INJECTION SUBCUTANEOUS at 17:52

## 2024-09-06 RX ADMIN — DEXAMETHASONE SODIUM PHOSPHATE 6 MG: 10 INJECTION INTRAMUSCULAR; INTRAVENOUS at 17:51

## 2024-09-06 RX ADMIN — BUMETANIDE 1 MG: 0.25 INJECTION INTRAMUSCULAR; INTRAVENOUS at 10:08

## 2024-09-06 RX ADMIN — METOPROLOL SUCCINATE 50 MG: 50 TABLET, FILM COATED, EXTENDED RELEASE ORAL at 08:31

## 2024-09-06 RX ADMIN — FLUTICASONE PROPIONATE 1 SPRAY: 50 SPRAY, METERED NASAL at 08:33

## 2024-09-06 RX ADMIN — Medication 10 ML: at 08:32

## 2024-09-06 RX ADMIN — CEFEPIME 2000 MG: 2 INJECTION, POWDER, FOR SOLUTION INTRAVENOUS at 01:27

## 2024-09-06 RX ADMIN — REMDESIVIR 200 MG: 100 INJECTION, POWDER, LYOPHILIZED, FOR SOLUTION INTRAVENOUS at 14:16

## 2024-09-06 NOTE — PROGRESS NOTES
Pharmacy Consult: Remdesivir Dosing  Karoline Prater is a 84 y.o. male currently hospitalized for COVID-19 pneumonia. Pharmacy has been consulted by pulmonology to dose remdesivir for COVID-19.    Labs  LIVER FUNCTION TESTS:      Lab 09/06/24  0604 09/05/24  1450   TOTAL PROTEIN 5.9* 6.5   ALBUMIN 2.8* 3.1*   GLOBULIN 3.1 3.4   ALT (SGPT) 26 32   AST (SGOT) 27 32   BILIRUBIN 0.5 0.4   ALK PHOS 86 101     Estimated Creatinine Clearance: 24.9 mL/min (A) (by C-G formula based on SCr of 2.13 mg/dL (H)).    Results from last 7 days   Lab Units 09/06/24  0604 09/05/24  1450   CREATININE mg/dL 2.13* 2.36*       Patient meets the following inclusion criteria:  Hospitalized with confirmed COVID-19 infection AND within 6 days of pre-admission symptoms. Date of positive SARS-CoV-2 test: 09/05/24  Requiring supplemental oxygen or an increase in baseline oxygen requirements OR SpO2 < 94% on room air all secondary to COVID-19 Infection.    Patient does not meet the following exclusion criteria:  Concomitant administration of HCQ or chloroquine due to antagonistic effect  Symptom onset > 10 days  ALT > 10x ULN  Requiring or progression to invasive mechanical ventilation or ECMO      Assessment/Plan:  Patient is hospitalized with confirmed, severe COVID-19 infection and started on remdesivir 200 mg IV once followed by 100 mg IV daily for 4 days (5 day total duration).    Thank you for involving pharmacy in this patient's care. Please contact pharmacy with any questions or concerns.                           Hari Millan [facility]  09/06/24 12:18 CDT

## 2024-09-06 NOTE — CONSULTS
Inpatient Consult Note            NAME: Karoline Prater  MRN: [id number]  AGE: 84 y.o.            : 1940  ADMISSION DATE: 2024  PRIMARY CARE PROVIDER: Irving Alexis MD  ATTENDING PHYSICIAN: Prasanth Stewart MD               Reason for Consult:  We have been consulted by Prasanth Stewart MD to see Karoline Prater for respiratory failure with COVID-19.    HPI:    Mr. Prater is a 84-year-old male admitted to [facility] secondary to acute on chronic respiratory failure.  Past medical history includes COPD, chronic hypoxic respiratory failure requiring 3 L at baseline, known recurrence of lung adenocarcinoma currently on palliative radiation, GERD, CAD, diastolic CHF.    The patient has had multiple admissions this year for both CHF and acute on chronic respiratory failure.  Most recent admission was on 2024 for which she was discharged from the hospital 1 week ago.  The patient was treated for acute on chronic respiratory failure at that time.  He was discharged on 3 L of supplemental oxygen continuously.  Patient states since discharge she was initially doing well.  However starting 3-4 days ago he developed a dry hacking cough which is new for him.  The past 2 days prior to admission he noted desaturations despite 3 L of supplemental oxygen.  States his oxygen sat would drop to the 70s before slowly improving when he sat down.  Due to worsening hypoxemia the patient presented to the ED.  In the ED workup included a chest x-ray which was concerning for volume overload.  Additionally the patient tested positive for COVID-19.  He has been started on Decadron management.    On exam the patient is resting in bed in no acute distress.  He is breathing comfortably on 3 L.  States his respiratory status is near baseline.  Of note the patient has had good urine output with Bumex.  Continues to note a dry cough.  Denies any fever, chills, chest pain, wheezing.    Review of  Systems:  A full 12-point review of systems was performed and was negative except as documented in the HPI.               Social History:  Social History     Socioeconomic History    Marital status: Single   Tobacco Use    Smoking status: Former     Current packs/day: 0.00     Types: Cigarettes     Start date: 10/29/1954     Quit date: 10/29/2003     Years since quittin.8    Smokeless tobacco: Former     Types: Chew     Quit date:    Vaping Use    Vaping status: Never Used   Substance and Sexual Activity    Alcohol use: No    Drug use: No    Sexual activity: Defer               Physical Exam:  General: Well appearing, in no respiratory distress  Head: Normocephalic, atraumatic  Eyes: Conjunctiva clear, sclera non-icteric  Teeth/Gums: Normal inspection  Neck: Supple without stridor  Heart: Regular rate and rhythm, no murmur  Lungs: Diminished bilaterally with coarse breath sounds  Abdomen: Soft, nontender  MSK/extremities: No cyanosis or edema  Neurologic: AOx3, grossly no focal deficits      Imaging Review:  I personally reviewed the chest x-ray done on 2024:  Chest x-ray showed chronic interstitial changes with pulmonary edema.      PFTs Reivew:  PFTs from 2019 show very mild obstructive disease with significantly diminished DLCO and air trapping.            Problem List:  Acute on chronic hypoxic respiratory failure  COVID-19 infection  COPD  Lung adenocarcinoma recurrence  Former smoker      Pulmonary Assessment:  Patient is an 84-year-old male who presents for worsening respiratory status and hypoxemia.  Likely multifactorial including COVID-19 as well as volume overload.  His respiratory status has improved with Decadron and Bumex.  Recommend continuing diuresis per primary.  Additionally he is currently on his baseline oxygen.  He does not qualify for Paxlovid due to his CKD.  Since he is requiring hospitalization with acute on chronic respiratory failure he does qualify for remdesivir.  Will  start remdesivir today.  I discussed remdesivir with the patient.  Explained this is IV only.  However if his respiratory status improves back to baseline with steroids and diuresis he does not need to remain admitted to complete the remdesivir course.  Also within the differential is VTE given his history and currently not on anticoagulation.  He has not been tachycardic, though he is on metoprolol, and his hypoxia has improved with current treatment.  Given this information plus his CKD we will hold off on CT of the chest.  If his respiratory status does worsen we may need to proceed with CT of the chest.  VQ scan will be of low utility given his chronic lung disease and current volume overload.      Recommendations:   -Supplemental oxygen as needed and wean as tolerated, goal O2 sat of 88-92%.  Doing well on his baseline of 3 L  -Recommend we avoid Paxlovid due to CKD.  Start remdesivir.  I explained to the patient if he improves with the Decadron and diuresis back to his respiratory status baseline he does not need to remain admitted to complete the remdesivir course  -Continue Decadron COVID-19 dosing, continue on discharge  -Agree with diuresis per primary  -Agree we can likely de-escalate his antibiotics soon, sputum culture is pending  -Continue current inhaled therapy, start nebulized medications once able  -Frequent IS and CPT  -PT/OT and ambulation  -Pulmonary clinic follow-up on discharge        Thank you for allowing us to participate in this patient's care. We will continue to follow.              Past Medical History:   Past Medical History:   Diagnosis Date    Arthritis     Atrial fibrillation with rapid ventricular response 05/31/2019    Blindness of left eye     BPH with obstruction/lower urinary tract symptoms     Cancer     stomach & lung    CHF (congestive heart failure)     CKD (chronic kidney disease) stage 3, GFR 30-59 ml/min     COPD with acute exacerbation 8/3/2024    Coronary artery  disease     Elevated cholesterol     Essential hypertension 10/02/2017    Fibrotic lung diseases     GERD (gastroesophageal reflux disease)     Hearing loss     History of transfusion     Hydronephrosis of left kidney     Hyperlipidemia     Hypertension     pt was taken off of all of his medications for BP (atenolol, lisinopril, lasix) because his BP kept bottoming out so his primary dr told him to discontinue them 1-2 months ago (Jan/Feb 2019). pt states he takes no medications currently.    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 Patel is just going to keep an eye on it and not do surgery right now.    Mediastinal adenopathy 10/24/2018    [facility]    Monoclonal gammopathy of unknown significance (MGUS) 09/11/2018    Pancreatic mass     pt states he had this in 2013 but it went away on its own. Now recent CT shows it has come back so he is going to have an ultrasound on 3/13/19.    Shortness of breath          Past Surgical History:   Past Surgical History:   Procedure Laterality Date    ABDOMINAL SURGERY      BRONCHOSCOPY N/A 10/24/2018    Procedure: BRONCHOSCOPY WITH BIOPSY, EBUS;  Surgeon: Gareth Becerra MD;  Location: [facility] OR;  Service: Pulmonary    CHOLECYSTECTOMY      COLONOSCOPY N/A 1/3/2019    Procedure: COLONOSCOPY WITH ANESTHESIA;  Surgeon: Randy Somers DO;  Location: [facility] ENDOSCOPY;  Service: Gastroenterology    CYSTOSCOPY, RETROGRADE PYELOGRAM AND STENT INSERTION Left 3/8/2019    Procedure: CYSTOSCOPY RETROGRADE BILATERAL PYELOGRAM;  Surgeon: Jos Sylvester MD;  Location: [facility] OR;  Service: Urology    ENDOSCOPY N/A 12/11/2018    Procedure: ESOPHAGOGASTRODUODENOSCOPY WITH ANESTHESIA;  Surgeon: Randy Somers DO;  Location: [facility] ENDOSCOPY;  Service: Gastroenterology    ENDOSCOPY N/A 4/29/2019    Procedure:  ESOPHAGOGASTRODUODENOSCOPY WITH ANESTHESIA;  Surgeon: Lilliam Jj MD;  Location: [facility] OR;  Service: Gastroenterology    ENDOSCOPY N/A 5/9/2019    Procedure: ESOPHAGOGASTRODUODENOSCOPY WITH ANESTHESIA;  Surgeon: Pilo Bansal MD;  Location: [facility] ENDOSCOPY;  Service: Gastroenterology    EYE SURGERY Left 1964    FOOT SURGERY Right 1966    joint    FRACTURE SURGERY      PACEMAKER IMPLANTATION Left 8/29/2024    Procedure: Leadless pacemaker implant and AVN ablation;  Surgeon: Carlitos Bowen MD;  Location: [facility] CATH INVASIVE LOCATION;  Service: Cardiovascular;  Laterality: Left;         Family History:   Family History   Problem Relation Age of Onset    Hypertension Mother     Leukemia Father          Medications:   Prior to Admission medications    Medication Sig Start Date End Date Taking? Authorizing Provider   acetaminophen (TYLENOL) 500 MG tablet Take 2 tablets by mouth Every Night.    ProviderEliecer MD   albuterol sulfate  (90 Base) MCG/ACT inhaler Inhale 2 puffs Every 4 (Four) Hours As Needed for Wheezing.  Patient not taking: Reported on 9/3/2024    Eliecer Schultz MD   aspirin 81 MG EC tablet Take 1 tablet by mouth Daily. 7/4/24   Zachary Lloyd MD   atorvastatin (LIPITOR) 20 MG tablet Take 1 tablet by mouth Every Night. 7/3/24   Zachary Lloyd MD   bumetanide (BUMEX) 0.5 MG tablet Take 1 tablet by mouth Daily As Needed (2 LB's overnight or 3 LB's in a week.  New onset of Shortness of breath or increasing bilateral lower extremity edema) for up to 90 days. 8/30/24 11/28/24  , MARITZA Iqbal   cetirizine (zyrTEC) 10 MG tablet Take 1 tablet by mouth Daily. 8/8/24   Cornelio Gordon DO   fluticasone (FLONASE) 50 MCG/ACT nasal spray 1 spray into the nostril(s) as directed by provider Daily.    Eliecer Schultz MD   isosorbide mononitrate (IMDUR) 30 MG 24 hr tablet Take 1 tablet by mouth Daily. Takes before lunch    Eliecer Schultz MD   melatonin 5 MG  "tablet tablet Take 2 tablets by mouth Every Night.    ProviderEliecer MD   metoprolol succinate XL (Toprol XL) 50 MG 24 hr tablet Take 1 tablet by mouth Daily. 7/4/24   Zachary Lloyd MD   montelukast (SINGULAIR) 10 MG tablet Take 1 tablet by mouth Every Night.  Patient not taking: Reported on 9/3/2024    Eliecer Schultz MD   multivitamin with minerals tablet tablet Take 1 tablet by mouth Daily.    Eliecer Schultz MD   pantoprazole (PROTONIX) 40 MG EC tablet Take 1 tablet by mouth Daily. 4/30/19   Hang Reddy DO   sodium bicarbonate 650 MG tablet Take 1 tablet by mouth 3 (Three) Times a Day.    Eliecer Schultz MD   tamsulosin (FLOMAX) 0.4 MG capsule 24 hr capsule Take 1 capsule by mouth Every Night.    Eliecer Schultz MD   tiotropium bromide-olodaterol (Stiolto Respimat) 2.5-2.5 MCG/ACT aerosol solution inhaler Inhale 2 puffs Daily. 8/8/24   Cornelio Gordon DO         Allergies:   Penicillins      Results Review:   Results from last 7 days   Lab Units 09/06/24  0604 09/05/24  1450 09/05/24  1445   SODIUM mmol/L 137 136  --    SODIUM, ARTERIAL mmol/L  --   --  138   POTASSIUM mmol/L 4.6 4.2  --    CHLORIDE mmol/L 102 103  --    CO2 mmol/L 24.0 24.0  --    BUN mg/dL 31* 28*  --    CALCIUM mg/dL 9.3 9.3  --      Results from last 7 days   Lab Units 09/06/24  0605 09/05/24  1450   WBC 10*3/mm3 3.75 6.35   HEMOGLOBIN g/dL 10.1* 10.6*   HEMATOCRIT % 32.4* 34.8*   PLATELETS 10*3/mm3 224 225       Visit Vitals  /80 (BP Location: Left arm, Patient Position: Lying)   Pulse 84   Temp 96 °F (35.6 °C) (Oral)   Resp 18   Ht 162.6 cm (64\")   Wt 75.8 kg (167 lb 3.2 oz)   SpO2 94%   BMI 28.70 kg/m²                Cornelio Gordon DO  Pulmonary/Critical Care  9/6/2024  13:47 CDT  "

## 2024-09-06 NOTE — PROGRESS NOTES
[facility] Medicine Services  INPATIENT PROGRESS NOTE    Patient Name: Karoline Prater  Date of Admission: 9/5/2024  Today's Date: 09/06/24  Length of Stay: 1  Primary Care Physician: Irving Alexis MD    Subjective   Chief Complaint: Shortness of breath  HPI   Patient states that he is feeling pretty good this morning.  He states that he feels like that he breathes fine when he is at rest, but his main concern has been his recent decline particularly with any exertion.  He states that he is really not been out of bed much since being admitted to the medical floor.  He wanted to know if he would be able to go home today, and I discussed with him that we need to ensure that he does not have the hypoxemia with exertion that he described that brought him to the emergency department.  He reports mostly a dry cough, but did state that on 1 occasion he brought up some clear sputum.  Denies any new orthopnea.  No chest pain or chest pressure.    Review of Systems   All pertinent negatives and positives are as above. All other systems have been reviewed and are negative unless otherwise stated.     Objective    Temp:  [97.9 °F (36.6 °C)-98.6 °F (37 °C)] 98.3 °F (36.8 °C)  Heart Rate:  [81-90] 82  Resp:  [16-22] 18  BP: (105-119)/(64-82) 116/68  Physical Exam  Vitals reviewed.   Constitutional:       General: He is not in acute distress.     Appearance: He is not toxic-appearing.   HENT:      Head: Normocephalic.   Eyes:      Comments: Blind left eye   Cardiovascular:      Rate and Rhythm: Normal rate.   Pulmonary:      Effort: Pulmonary effort is normal. No respiratory distress.      Comments: Crackly BSs noted bilaterally; R>L; on 3LNC; no work of breathing or accessory muscle use  Musculoskeletal:      Right lower leg: Edema present.      Left lower leg: Edema present.   Skin:     General: Skin is warm.   Neurological:      General: No focal deficit present.      Mental  "Status: He is alert.   Psychiatric:         Mood and Affect: Mood normal.       Results Review:  I have reviewed the labs, radiology results, and diagnostic studies.    Laboratory Data:   Results from last 7 days   Lab Units 09/06/24  0605 09/05/24  1450   WBC 10*3/mm3 3.75 6.35   HEMOGLOBIN g/dL 10.1* 10.6*   HEMATOCRIT % 32.4* 34.8*   PLATELETS 10*3/mm3 224 225        Results from last 7 days   Lab Units 09/06/24  0604 09/05/24  1450 09/05/24  1445   SODIUM mmol/L 137 136  --    SODIUM, ARTERIAL mmol/L  --   --  138   POTASSIUM mmol/L 4.6 4.2  --    CHLORIDE mmol/L 102 103  --    CO2 mmol/L 24.0 24.0  --    BUN mg/dL 31* 28*  --    CREATININE mg/dL 2.13* 2.36*  --    CALCIUM mg/dL 9.3 9.3  --    BILIRUBIN mg/dL 0.5 0.4  --    ALK PHOS U/L 86 101  --    ALT (SGPT) U/L 26 32  --    AST (SGOT) U/L 27 32  --    GLUCOSE mg/dL 139* 113*  --        Culture Data:   No results found for: \"BLOODCX\", \"URINECX\", \"WOUNDCX\", \"MRSACX\", \"RESPCX\", \"STOOLCX\"    Radiology Data:   Imaging Results (Last 24 Hours)       Procedure Component Value Units Date/Time    XR Chest 1 View [[id number]] Collected: 09/05/24 1520     Updated: 09/05/24 1525    Narrative:      EXAMINATION: XR CHEST 1 VW-     9/5/2024 2:12 PM     HISTORY: Shortness of breath.     1 view chest x-ray.     COMPARISON:  8/28/2024 2 view chest x-ray.  8/3/2024 chest CT.     Diffuse interstitial lung disease.     RIGHT lung nodules. There is a known history of lung carcinoma.     Increased interstitial prominence compatible with worsening interstitial  edema.     Magnified heart size.  Probable cardiac enlargement.     A RIGHT chest wall port is present.       Impression:      1. Low lung volumes with increased interstitial prominence compatible  with pulmonary edema superimposed on chronic change.           This report was signed and finalized on 9/5/2024 3:22 PM by Dr. Danilo Sebastian MD.               I have reviewed the patient's current medications.     Assessment/Plan "   Assessment  Active Hospital Problems    Diagnosis     COVID-19 virus infection     Chronic diastolic congestive heart failure     COPD (chronic obstructive pulmonary disease)     Bilateral lower extremity edema     Former smoker     Pneumonia due to infectious organism     Acute on chronic respiratory failure with hypoxia     Pulmonary fibrosis     Stage 3b chronic kidney disease     Malignant neoplasm of upper lobe of right lung        Treatment Plan  Currently on 3LNC - this is his home requirement  Dexamethasone 6mg IV daily - day 2 today  Pulmonary consultation - appreciate their assistance  Empiric IV Cefepime - day 2.  Procalcitonin is normal.  WBC also normal.  Consider deescalation of antibiotic treatment in near future pending clinical response.  Plan to repeat dose of IV Bumex today; monitor volume status, renal function and daily weights closely  Albuterol and Atrovent HFA  7.  Echocardiogram from July 2024 as follows:    8.  MRSA PCR negative  9.  Follow-up respiratory culture - currently pending  10.  Incentive spirometry  11.  Monitor closely to determine consideration for Remdesivir or Paxlovid.  His renal insufficiency must be accounted for in this circumstance.  12.  Lovenox PPx  13.  PT assessment -would like to see patient's O2 trend with exertion.  That was the primary reason that he presented to the hospital was noticing worsening hypoxemia with any activity, and per patient was having O2 sats trending down into the upper 70s, low 80s with exertion.       Medical Decision Making  Number and Complexity of problems: 3 acute; high complexity        Conditions and Status        Condition is improving compared to my assessment last PM     MDM Data  External documents reviewed: outpatient note from Dr. Cool reviewed regarding suspected recurrence of lung CA  Cardiac tracing (EKG, telemetry) interpretation: no new EKGs  Radiology interpretation:     Labs reviewed: as above  Any tests that were  considered but not ordered: CT Chest     Decision rules/scores evaluated (example [id number], Wells, etc): none     Discussed with: patient     Care Planning  Shared decision making: Discussed with patient with agreement to proceed with treatment plan as outlined  Code status and discussions: DO NOT RESUSCITATE     Disposition  Social Determinants of Health that impact treatment or disposition: None apparent at this time  I expect patient to be discharged to home plus or minus home health services likely in 2-3 days.    Electronically signed by Prasanth Stewart MD, 09/06/24, 09:22 CDT.

## 2024-09-06 NOTE — PLAN OF CARE
Goal Outcome Evaluation:  Plan of Care Reviewed With: patient           Outcome Evaluation: OT eval completed. Pt presents alert and oriented, on 3L O2.NC. He was recently d/c'd from this facility on 8/31 and has returned due to SOA from COVID-19. Pt has a hx of pulmonary fibrosis which greatly impacts his activity tolerance during session today. He performed bed mobility with Sup. Donned socks with set-up and SBA requiring rest breaks between donning each sock and after completion of task due to SOA. Completed sit <> stand t/fs and all functional mobility with CGA and use of rwx. O2 sats dropped to low 80s with short distance mobility but rebounded back to low 90s within 5 minutes of seated position. Pt was left sitting up in chair at end of session. He would benefit from skilled OT services to address these deficits and assist with return to PLOF. Anticipate d/c back home with brother.      Anticipated Discharge Disposition (OT): skilled nursing facility

## 2024-09-06 NOTE — PLAN OF CARE
Goal Outcome Evaluation:  Plan of Care Reviewed With: patient        Progress: improving  Outcome Evaluation: VSS.  80-87. no SOB or cough noted. participated with PT, see notes. no c/o pain. IV site changed this afternoon and tolerated well. pt has a good appetite noted.

## 2024-09-06 NOTE — OUTREACH NOTE
Medical Week 1 Survey      Flowsheet Row Responses   [facility] patient discharged from? [facility]   Does the patient have one of the following disease processes/diagnoses(primary or secondary)? Other   Week 1 attempt successful? No   Unsuccessful attempts Attempt 1   Revoke Readmitted            NEREYDA REED - Registered Nurse

## 2024-09-06 NOTE — CASE MANAGEMENT/SOCIAL WORK
Discharge Planning Assessment  [facility]     Patient Name: Karoline Prater  MRN: [id number]  Today's Date: 9/6/2024    Admit Date: 9/5/2024        Discharge Needs Assessment       Row Name 09/06/24 0912       Living Environment    People in Home sibling(s)    Current Living Arrangements home    Potentially Unsafe Housing Conditions none    In the past 12 months has the electric, gas, oil, or water company threatened to shut off services in your home? No    Primary Care Provided by self    Provides Primary Care For no one, unable/limited ability to care for self    Family Caregiver if Needed sibling(s)    Family Caregiver Names Keshav    Quality of Family Relationships helpful;involved;supportive    Able to Return to Prior Arrangements yes       Resource/Environmental Concerns    Resource/Environmental Concerns none    Transportation Concerns none       Transportation Needs    In the past 12 months, has lack of transportation kept you from medical appointments or from getting medications? no    In the past 12 months, has lack of transportation kept you from meetings, work, or from getting things needed for daily living? No       Food Insecurity    Within the past 12 months, you worried that your food would run out before you got the money to buy more. Never true    Within the past 12 months, the food you bought just didn't last and you didn't have money to get more. Never true       Transition Planning    Patient/Family Anticipates Transition to home    Patient/Family Anticipated Services at Transition none    Transportation Anticipated family or friend will provide       Discharge Needs Assessment    Readmission Within the Last 30 Days current reason for admission unrelated to previous admission    Equipment Currently Used at Home oxygen;walker, rolling;cane, quad tip    Anticipated Changes Related to Illness none    Equipment Needed After Discharge none    Provided Post Acute Provider List? N/A    Provided Post  Acute Provider Quality & Resource List? N/A    Discharge Coordination/Progress Lives at home with his brother. Brother provides assistance and transportation as needed. Has PCP and Rx coverage. No D/C needs identified at this time.                   Discharge Plan       Row Name 09/06/24 0904       Plan    Final Discharge Disposition Code 01 - home or self-care                  Continued Care and Services - Admitted Since 9/5/2024    No active coordination exists for this encounter.       Selected Continued Care - Prior Encounters Includes continued care and service providers with selected services from prior encounters from 6/7/2024 to 9/6/2024      Discharged on 8/8/2024 Admission date: 8/2/2024 - Discharge disposition: Home-Health Care [facility]      Home Medical Care       Service Provider Selected Services Address Phone Fax Patient Preferred    [facility] [address], [facility] [address] [phone] --                             Demographic Summary    No documentation.                  Functional Status    No documentation.                  Psychosocial    No documentation.                  Abuse/Neglect    No documentation.                  Legal    No documentation.                  Substance Abuse    No documentation.                  Patient Forms    No documentation.                     Pilo Griffith RN

## 2024-09-06 NOTE — PLAN OF CARE
Goal Outcome Evaluation:              Outcome Evaluation: Patient alert and oriented x4. Remains on 3 L O2.  V-paced rate 81-92.  No complaints of pain.  IV abx given per order.

## 2024-09-06 NOTE — THERAPY EVALUATION
Patient Name: Karoline Prater  : 1940    MRN: [id number]                              Today's Date: 2024       Admit Date: 2024    Visit Dx:     ICD-10-CM ICD-9-CM   1. Acute on chronic congestive heart failure, unspecified heart failure type  I50.9 428.0   2. Hypoxia  R09.02 799.02   3. Chronic renal impairment, unspecified CKD stage  N18.9 585.9   4. COVID-19  U07.1 079.89     Patient Active Problem List   Diagnosis    Dyspnea    Essential hypertension    NSVT (nonsustained ventricular tachycardia)    Malignant neoplasm of upper lobe of right lung    Stage 3b chronic kidney disease    Pancytopenia due to antineoplastic chemotherapy    Pneumonia due to infectious organism    Function kidney decreased    Cellulitis    Acute on chronic respiratory failure with hypoxia    Pulmonary fibrosis    Macrocytic anemia    Thrombocytopenia    Sepsis    Right pneumothorax    Hyperkalemia    Acute kidney injury superimposed on CKD stage 3b    GERD without esophagitis    A-fib    Former smoker    Chest pain    Tachycardia    Bilateral lower extremity edema    Metastatic adenocarcinoma of the RUL    Chronic diastolic congestive heart failure    COPD (chronic obstructive pulmonary disease)    S/P radiation > 12 weeks    CHF (congestive heart failure)    History of radiation therapy    Chronic heart failure with preserved ejection fraction (HFpEF)    Narrow complex tachycardia    Atrial flutter    Encounter for examination for normal comparison and control in clinical research program    CHF exacerbation    COVID-19 virus infection     Past Medical History:   Diagnosis Date    Arthritis     Atrial fibrillation with rapid ventricular response 2019    Blindness of left eye     BPH with obstruction/lower urinary tract symptoms     Cancer     stomach & lung    CHF (congestive heart failure)     CKD (chronic kidney disease) stage 3, GFR 30-59 ml/min     COPD with acute exacerbation 8/3/2024    Coronary artery  disease     Elevated cholesterol     Essential hypertension 10/02/2017    Fibrotic lung diseases     GERD (gastroesophageal reflux disease)     Hearing loss     History of transfusion     Hydronephrosis of left kidney     Hyperlipidemia     Hypertension     pt was taken off of all of his medications for BP (atenolol, lisinopril, lasix) because his BP kept bottoming out so his primary dr told him to discontinue them 1-2 months ago (Jan/Feb 2019). pt states he takes no medications currently.    Lung cancer     Mass of duodenum versus letyt hepatis  04/27/2019    Mass of left renal hilum  04/27/2019    Mass of upper lobe of right lung 02/2019    mass is shrinking on its own, so pt states Dr. Patel is just going to keep an eye on it and not do surgery right now.    Mediastinal adenopathy 10/24/2018    Station [facility]    Monoclonal gammopathy of unknown significance (MGUS) 09/11/2018    Pancreatic mass     pt states he had this in 2013 but it went away on its own. Now recent CT shows it has come back so he is going to have an ultrasound on 3/13/19.    Shortness of breath      Past Surgical History:   Procedure Laterality Date    ABDOMINAL SURGERY      BRONCHOSCOPY N/A 10/24/2018    Procedure: BRONCHOSCOPY WITH BIOPSY, EBUS;  Surgeon: Gareth Becerra MD;  Location: [facility] OR;  Service: Pulmonary    CHOLECYSTECTOMY      COLONOSCOPY N/A 1/3/2019    Procedure: COLONOSCOPY WITH ANESTHESIA;  Surgeon: Randy Somers DO;  Location: [facility] ENDOSCOPY;  Service: Gastroenterology    CYSTOSCOPY, RETROGRADE PYELOGRAM AND STENT INSERTION Left 3/8/2019    Procedure: CYSTOSCOPY RETROGRADE BILATERAL PYELOGRAM;  Surgeon: Jos Sylvester MD;  Location: [facility] OR;  Service: Urology    ENDOSCOPY N/A 12/11/2018    Procedure: ESOPHAGOGASTRODUODENOSCOPY WITH ANESTHESIA;  Surgeon: Randy Somers DO;  Location: [facility] ENDOSCOPY;  Service: Gastroenterology    ENDOSCOPY N/A 4/29/2019    Procedure: ESOPHAGOGASTRODUODENOSCOPY WITH ANESTHESIA;   Surgeon: Lilliam Jj MD;  Location: [facility] OR;  Service: Gastroenterology    ENDOSCOPY N/A 5/9/2019    Procedure: ESOPHAGOGASTRODUODENOSCOPY WITH ANESTHESIA;  Surgeon: Pilo Bansal MD;  Location: [facility] ENDOSCOPY;  Service: Gastroenterology    EYE SURGERY Left 1964    FOOT SURGERY Right 1966    joint    FRACTURE SURGERY      PACEMAKER IMPLANTATION Left 8/29/2024    Procedure: Leadless pacemaker implant and AVN ablation;  Surgeon: Carlitos Bowen MD;  Location: [facility] CATH INVASIVE LOCATION;  Service: Cardiovascular;  Laterality: Left;      General Information       Row Name 09/06/24 1315          OT Time and Intention    Document Type evaluation  ED with cough and SOA. Hx of PF. Dx: CHF exacerbation, COVID-19  -JJ     Mode of Treatment occupational therapy  -JJ       Row Name 09/06/24 1315          General Information    Patient Profile Reviewed yes  -JJ     Prior Level of Function independent:;all household mobility;transfer;bed mobility;ADL's  -JJ     Existing Precautions/Restrictions oxygen therapy device and L/min  3L  -J     Barriers to Rehab medically complex  -JJ       Row Name 09/06/24 1315          Living Environment    People in Home sibling(s)  -JJ       Row Name 09/06/24 1315          Cognition    Orientation Status (Cognition) oriented x 4  -JJ       Row Name 09/06/24 1315          Safety Issues, Functional Mobility    Impairments Affecting Function (Mobility) endurance/activity tolerance;shortness of breath;balance  -JJ               User Key  (r) = Recorded By, (t) = Taken By, (c) = Cosigned By      Initials Name Provider Type    JBetsey Reyna, OTR/L, CSRS Occupational Therapist                     Mobility/ADL's       Row Name 09/06/24 1315          Bed Mobility    Bed Mobility supine-sit  -JJ     Supine-Sit [place] (Bed Mobility) standby assist  -JJ     Assistive Device (Bed Mobility) head of bed elevated;bed rails  -JJ       Row Name 09/06/24 1315          Transfers     Transfers sit-stand transfer;stand-sit transfer  -       Row Name 09/06/24 1315          Sit-Stand Transfer    Sit-Stand [place] (Transfers) contact guard  -       Row Name 09/06/24 1315          Stand-Sit Transfer    Stand-Sit [place] (Transfers) contact guard  -       Row Name 09/06/24 1315          Functional Mobility    Functional Mobility- Ind. Level contact guard assist  -     Functional Mobility- Device walker, front-wheeled  -J     Functional Mobility- Comment amb around bed to chair. O2 sats dropped to 80% on 3L O2/NC. Recovered within 5 minutes of seated rest break.  -       Row Name 09/06/24 1315          Activities of Daily Living    BADL Assessment/Intervention lower body dressing;toileting  -[facility] Name 09/06/24 1315          Lower Body Dressing Assessment/Training    [place] Level (Lower Body Dressing) don;socks;set up;standby assist  -     Position (Lower Body Dressing) edge of bed sitting  -     Comment, (Lower Body Dressing) rest break required between socks and after task due to SOA.  -[facility] Name 09/06/24 1315          Toileting Assessment/Training    [place] Level (Toileting) adjust/manage clothing;independent  -     Assistive Devices (Toileting) urinal  -J     Position (Toileting) edge of bed sitting  -J               User Key  (r) = Recorded By, (t) = Taken By, (c) = Cosigned By      Initials Name Provider Type    Betsey Birmingham, SUNITHA/L, CSRS Occupational Therapist                   Obj/Interventions       Row Name 09/06/24 1315          Sensory Assessment (Somatosensory)    Sensory Assessment (Somatosensory) UE sensation intact  -[facility] Name 09/06/24 1315          Vision Assessment/Intervention    Visual Impairment/Limitations L  [facility]       Row Name 09/06/24 1315          Range of Motion Comprehensive    General Range of Motion bilateral upper extremity ROM [facility] Name 09/06/24 1315          Strength Comprehensive  (MMT)    Comment, General Manual Muscle Testing (MMT) Assessment B UE strength grossly 4/5  -JJ       Row Name 09/06/24 1315          Balance    Balance Assessment sitting static balance;sitting dynamic balance;standing static balance;standing dynamic balance  -JJ     Static Sitting Balance independent  -JJ     Dynamic Sitting Balance supervision  -JJ     Position, Sitting Balance unsupported;sitting edge of bed  -JJ     Static Standing Balance contact guard;supervision  -JJ     Dynamic Standing Balance contact guard  -JJ     Position/Device Used, Standing Balance supported;walker, front-wheeled  -JJ               User Key  (r) = Recorded By, (t) = Taken By, (c) = Cosigned By      Initials Name Provider Type    Betsey Birmingham, OTR/L, CSRS Occupational Therapist                   Goals/Plan       Row Name 09/06/24 1315          Transfer Goal 1 (OT)    Activity/Assistive Device (Transfer Goal 1, OT) toilet;shower chair  -JJ     [place] Level/Cues Needed (Transfer Goal 1, OT) independent  -JJ     Time Frame (Transfer Goal 1, OT) long term goal (LTG);by discharge  -JJ     Progress/Outcome (Transfer Goal 1, OT) new goal  -       Row Name 09/06/24 1315          Bathing Goal 1 (OT)    Activity/Device (Bathing Goal 1, OT) bathing skills, all  -JJ     [place] Level/Cues Needed (Bathing Goal 1, OT) modified independence  -JJ     Time Frame (Bathing Goal 1, OT) long term goal (LTG);by discharge  -JJ     Progress/Outcomes (Bathing Goal 1, OT) new goal  -JJ       Row Name 09/06/24 1315          Dressing Goal 1 (OT)    Activity/Device (Dressing Goal 1, OT) dressing skills, all  -JJ     [place]/Cues Needed (Dressing Goal 1, OT) independent  -JJ     Time Frame (Dressing Goal 1, OT) long term goal (LTG);by discharge  -JJ     Progress/Outcome (Dressing Goal 1, OT) new goal  -       Row Name 09/06/24 1315          Problem Specific Goal 1 (OT)    Problem Specific Goal 1 (OT) Pt will implement one energy  conservation technique during adl tasks to increase his tolerance for activity and safety during adls.  -J     Time Frame (Problem Specific Goal 1, OT) long term goal (LTG);by discharge  -     Progress/Outcome (Problem Specific Goal 1, OT) new goal  -       Row Name 09/06/24 2152          Therapy Assessment/Plan (OT)    Planned Therapy Interventions (OT) activity tolerance training;adaptive equipment training;BADL retraining;functional balance retraining;transfer/mobility retraining;occupation/activity based interventions;strengthening exercise;patient/caregiver education/training  -               User Key  (r) = Recorded By, (t) = Taken By, (c) = Cosigned By      Initials Name Provider Type    JBetsey Reyna, OTR/L, CSRS Occupational Therapist                   Clinical Impression       Row Name 09/06/24 1319          Pain Assessment    Pretreatment Pain Rating 0/10 - no pain  -JJ     Posttreatment Pain Rating 0/10 - no pain  -JJ     Pain Intervention(s) Medication (See MAR);Repositioned;Ambulation/increased activity  -       Row Name 09/06/24 4733          Plan of Care Review    Plan of Care Reviewed With patient  -     Outcome Evaluation OT eval completed. Pt presents alert and oriented, on 3L O2.NC. He was recently d/c'd from this facility on 8/31 and has returned due to SOA from COVID-19. Pt has a hx of pulmonary fibrosis which greatly impacts his activity tolerance during session today. He performed bed mobility with Sup. Donned socks with set-up and SBA requiring rest breaks between donning each sock and after completion of task due to SOA. Completed sit <> stand t/fs and all functional mobility with CGA and use of rwx. O2 sats dropped to low 80s with short distance mobility but rebounded back to low 90s within 5 minutes of seated position. Pt was left sitting up in chair at end of session. He would benefit from skilled OT services to address these deficits and assist with return to PLOF.  Anticipate d/c back home with brother.  -       Row Name 09/06/24 1315          Therapy Assessment/Plan (OT)    Rehab Potential (OT) good, to achieve stated therapy goals  -     Criteria for Skilled Therapeutic Interventions Met (OT) yes;skilled treatment is necessary  -     Therapy Frequency (OT) 5 times/wk  -     Predicted Duration of Therapy Intervention (OT) 10 days  -       Row Name 09/06/24 1315          Therapy Plan Review/Discharge Plan (OT)    Anticipated Discharge Disposition (OT) skilled nursing facility  -       Row Name 09/06/24 1315          Positioning and Restraints    Pre-Treatment Position in bed  -J     Post Treatment Position chair  -JJ     In Chair notified nsg;reclined;call light within reach;encouraged to call for assist  -               User Key  (r) = Recorded By, (t) = Taken By, (c) = Cosigned By      Initials Name Provider Type    Betsey Birmingham, OTR/L, CSRS Occupational Therapist                   Outcome Measures       Row Name 09/06/24 1315          How much help from another is currently needed...    Putting on and taking off regular lower body clothing? 3  -JJ     Bathing (including washing, rinsing, and drying) 3  -JJ     Toileting (which includes using toilet bed pan or urinal) 3  -JJ     Putting on and taking off regular upper body clothing 4  -JJ     Taking care of personal grooming (such as brushing teeth) 4  -JJ     Eating meals 4  -JJ     AM-PAC 6 Clicks Score (OT) 21  -       Row Name 09/06/24 0800          How much help from another person do you currently need...    Turning from your back to your side while in flat bed without using bedrails? 4  -JM     Moving from lying on back to sitting on the side of a flat bed without bedrails? 3  -JM     Moving to and from a bed to a chair (including a wheelchair)? 3  -JM     Standing up from a chair using your arms (e.g., wheelchair, bedside chair)? 3  -JM     Climbing 3-5 steps with a railing? 3  -JM     To  walk in hospital room? 3  -     AM-PAC 6 Clicks Score (PT) 19  -     Highest Level of Mobility Goal 6 --> Walk 10 steps or more  -       Row Name 09/06/24 1315          Functional Assessment    Outcome Measure Options AM-PAC 6 Clicks Daily Activity (OT)  -               User Key  (r) = Recorded By, (t) = Taken By, (c) = Cosigned By      Initials Name Provider Type     Betsey Arias OTR/L, JULES Occupational Therapist    Evelina Mendoza, RN Registered Nurse                    Occupational Therapy Education       Title: PT OT SLP Therapies (In Progress)       Topic: Occupational Therapy (In Progress)       Point: ADL training (Done)       Description:   Instruct learner(s) on proper safety adaptation and remediation techniques during self care or transfers.   Instruct in proper use of assistive devices.                  Learning Progress Summary             Patient Acceptance, E, VU by  at 9/6/2024 1412                         Point: Home exercise program (Not Started)       Description:   Instruct learner(s) on appropriate technique for monitoring, assisting and/or progressing therapeutic exercises/activities.                  Learner Progress:  Not documented in this visit.              Point: Precautions (Done)       Description:   Instruct learner(s) on prescribed precautions during self-care and functional transfers.                  Learning Progress Summary             Patient Acceptance, E, VU by  at 9/6/2024 1412                         Point: Body mechanics (Not Started)       Description:   Instruct learner(s) on proper positioning and spine alignment during self-care, functional mobility activities and/or exercises.                  Learner Progress:  Not documented in this visit.                              User Key       Initials Effective Dates Name Provider Type Discipline     07/11/23 -  Betsey Arias OTR/L, JULES Occupational Therapist [facility]                  OT  Recommendation and Plan  Planned Therapy Interventions (OT): activity tolerance training, adaptive equipment training, BADL retraining, functional balance retraining, transfer/mobility retraining, occupation/activity based interventions, strengthening exercise, patient/caregiver education/training  Therapy Frequency (OT): 5 times/wk  Plan of Care Review  Plan of Care Reviewed With: patient  Outcome Evaluation: OT eval completed. Pt presents alert and oriented, on 3L O2.NC. He was recently d/c'd from this facility on 8/31 and has returned due to SOA from COVID-19. Pt has a hx of pulmonary fibrosis which greatly impacts his activity tolerance during session today. He performed bed mobility with Sup. Donned socks with set-up and SBA requiring rest breaks between donning each sock and after completion of task due to SOA. Completed sit <> stand t/fs and all functional mobility with CGA and use of rwx. O2 sats dropped to low 80s with short distance mobility but rebounded back to low 90s within 5 minutes of seated position. Pt was left sitting up in chair at end of session. He would benefit from skilled OT services to address these deficits and assist with return to PLOF. Anticipate d/c back home with brother.     Time Calculation:         Time Calculation- OT       Row Name 09/06/24 1315             Time Calculation- OT    OT Start Time 1315  -      OT Stop Time 1408  -      OT Time Calculation (min) 53 min  -      OT Received On 09/06/24  -      OT Goal Re-Cert Due Date 09/16/24  -                User Key  (r) = Recorded By, (t) = Taken By, (c) = Cosigned By      Initials Name Provider Type    Betsey Birmingham OTR/L, JULES Occupational Therapist                  Therapy Charges for Today       Code Description Service Date Service Provider Modifiers Qty    01497946625 HC OT EVAL MOD COMPLEXITY 4 9/6/2024 Betsey Arias OTR/L, JULES GO 1                 Betsey Arias, OTR/L, JULES  9/6/2024

## 2024-09-07 LAB
ALBUMIN SERPL-MCNC: 2.8 G/DL (ref 3.5–5.2)
ALBUMIN/GLOB SERPL: 0.9 G/DL
ALP SERPL-CCNC: 92 U/L (ref 39–117)
ALT SERPL W P-5'-P-CCNC: 25 U/L (ref 1–41)
ANION GAP SERPL CALCULATED.3IONS-SCNC: 10 MMOL/L (ref 5–15)
AST SERPL-CCNC: 31 U/L (ref 1–40)
BACTERIA SPEC RESP CULT: NORMAL
BASOPHILS # BLD AUTO: 0.01 10*3/MM3 (ref 0–0.2)
BASOPHILS NFR BLD AUTO: 0.2 % (ref 0–1.5)
BILIRUB SERPL-MCNC: 0.3 MG/DL (ref 0–1.2)
BUN SERPL-MCNC: 38 MG/DL (ref 8–23)
BUN/CREAT SERPL: 17 (ref 7–25)
CALCIUM SPEC-SCNC: 9.2 MG/DL (ref 8.6–10.5)
CHLORIDE SERPL-SCNC: 105 MMOL/L (ref 98–107)
CO2 SERPL-SCNC: 26 MMOL/L (ref 22–29)
CREAT SERPL-MCNC: 2.23 MG/DL (ref 0.76–1.27)
DEPRECATED RDW RBC AUTO: 56.5 FL (ref 37–54)
EGFRCR SERPLBLD CKD-EPI 2021: 28.7 ML/MIN/1.73
EOSINOPHIL # BLD AUTO: 0.15 10*3/MM3 (ref 0–0.4)
EOSINOPHIL NFR BLD AUTO: 2.4 % (ref 0.3–6.2)
ERYTHROCYTE [DISTWIDTH] IN BLOOD BY AUTOMATED COUNT: 16.2 % (ref 12.3–15.4)
GLOBULIN UR ELPH-MCNC: 3.2 GM/DL
GLUCOSE SERPL-MCNC: 104 MG/DL (ref 65–99)
GRAM STN SPEC: NORMAL
HCT VFR BLD AUTO: 33.8 % (ref 37.5–51)
HGB BLD-MCNC: 10.6 G/DL (ref 13–17.7)
IMM GRANULOCYTES # BLD AUTO: 0.05 10*3/MM3 (ref 0–0.05)
IMM GRANULOCYTES NFR BLD AUTO: 0.8 % (ref 0–0.5)
LYMPHOCYTES # BLD AUTO: 0.98 10*3/MM3 (ref 0.7–3.1)
LYMPHOCYTES NFR BLD AUTO: 15.5 % (ref 19.6–45.3)
MCH RBC QN AUTO: 29.7 PG (ref 26.6–33)
MCHC RBC AUTO-ENTMCNC: 31.4 G/DL (ref 31.5–35.7)
MCV RBC AUTO: 94.7 FL (ref 79–97)
MONOCYTES # BLD AUTO: 0.58 10*3/MM3 (ref 0.1–0.9)
MONOCYTES NFR BLD AUTO: 9.2 % (ref 5–12)
NEUTROPHILS NFR BLD AUTO: 4.55 10*3/MM3 (ref 1.7–7)
NEUTROPHILS NFR BLD AUTO: 71.9 % (ref 42.7–76)
NRBC BLD AUTO-RTO: 0 /100 WBC (ref 0–0.2)
PLATELET # BLD AUTO: 226 10*3/MM3 (ref 140–450)
PMV BLD AUTO: 11.1 FL (ref 6–12)
POTASSIUM SERPL-SCNC: 4 MMOL/L (ref 3.5–5.2)
PROT SERPL-MCNC: 6 G/DL (ref 6–8.5)
RBC # BLD AUTO: 3.57 10*6/MM3 (ref 4.14–5.8)
SODIUM SERPL-SCNC: 141 MMOL/L (ref 136–145)
WBC NRBC COR # BLD AUTO: 6.32 10*3/MM3 (ref 3.4–10.8)

## 2024-09-07 PROCEDURE — 99232 SBSQ HOSP IP/OBS MODERATE 35: CPT | Performed by: INTERNAL MEDICINE

## 2024-09-07 PROCEDURE — 25810000003 SODIUM CHLORIDE 0.9 % SOLUTION 250 ML FLEX CONT: Performed by: INTERNAL MEDICINE

## 2024-09-07 PROCEDURE — 25010000002 REMDESIVIR 100 MG/20ML SOLUTION 1 EACH VIAL: Performed by: INTERNAL MEDICINE

## 2024-09-07 PROCEDURE — 25010000002 ENOXAPARIN PER 10 MG: Performed by: INTERNAL MEDICINE

## 2024-09-07 PROCEDURE — 94664 DEMO&/EVAL PT USE INHALER: CPT

## 2024-09-07 PROCEDURE — 25010000002 DEXAMETHASONE PER 1 MG: Performed by: INTERNAL MEDICINE

## 2024-09-07 PROCEDURE — 94761 N-INVAS EAR/PLS OXIMETRY MLT: CPT

## 2024-09-07 PROCEDURE — 85025 COMPLETE CBC W/AUTO DIFF WBC: CPT | Performed by: INTERNAL MEDICINE

## 2024-09-07 PROCEDURE — 80053 COMPREHEN METABOLIC PANEL: CPT | Performed by: INTERNAL MEDICINE

## 2024-09-07 PROCEDURE — 25010000002 CEFEPIME PER 500 MG: Performed by: INTERNAL MEDICINE

## 2024-09-07 PROCEDURE — 94799 UNLISTED PULMONARY SVC/PX: CPT

## 2024-09-07 RX ADMIN — ISOSORBIDE MONONITRATE 30 MG: 30 TABLET, EXTENDED RELEASE ORAL at 08:33

## 2024-09-07 RX ADMIN — Medication 5 MG: at 20:27

## 2024-09-07 RX ADMIN — PANTOPRAZOLE SODIUM 40 MG: 40 TABLET, DELAYED RELEASE ORAL at 08:33

## 2024-09-07 RX ADMIN — ENOXAPARIN SODIUM 30 MG: 100 INJECTION SUBCUTANEOUS at 17:39

## 2024-09-07 RX ADMIN — ATORVASTATIN CALCIUM 20 MG: 10 TABLET, FILM COATED ORAL at 20:27

## 2024-09-07 RX ADMIN — FLUTICASONE PROPIONATE 1 SPRAY: 50 SPRAY, METERED NASAL at 08:32

## 2024-09-07 RX ADMIN — SODIUM BICARBONATE 650 MG: 650 TABLET ORAL at 17:39

## 2024-09-07 RX ADMIN — REMDESIVIR 100 MG: 100 INJECTION, POWDER, LYOPHILIZED, FOR SOLUTION INTRAVENOUS at 13:20

## 2024-09-07 RX ADMIN — DEXAMETHASONE SODIUM PHOSPHATE 6 MG: 10 INJECTION INTRAMUSCULAR; INTRAVENOUS at 17:39

## 2024-09-07 RX ADMIN — IPRATROPIUM BROMIDE 2 PUFF: 17 AEROSOL, METERED RESPIRATORY (INHALATION) at 06:41

## 2024-09-07 RX ADMIN — SODIUM BICARBONATE 650 MG: 650 TABLET ORAL at 20:27

## 2024-09-07 RX ADMIN — SODIUM BICARBONATE 650 MG: 650 TABLET ORAL at 08:33

## 2024-09-07 RX ADMIN — TAMSULOSIN HYDROCHLORIDE 0.4 MG: 0.4 CAPSULE ORAL at 20:27

## 2024-09-07 RX ADMIN — Medication 10 ML: at 20:27

## 2024-09-07 RX ADMIN — METOPROLOL SUCCINATE 50 MG: 50 TABLET, FILM COATED, EXTENDED RELEASE ORAL at 08:33

## 2024-09-07 RX ADMIN — IPRATROPIUM BROMIDE 2 PUFF: 17 AEROSOL, METERED RESPIRATORY (INHALATION) at 19:05

## 2024-09-07 RX ADMIN — ASPIRIN 81 MG: 81 TABLET, COATED ORAL at 08:33

## 2024-09-07 RX ADMIN — CEFEPIME 2000 MG: 2 INJECTION, POWDER, FOR SOLUTION INTRAVENOUS at 02:15

## 2024-09-07 RX ADMIN — IPRATROPIUM BROMIDE 2 PUFF: 17 AEROSOL, METERED RESPIRATORY (INHALATION) at 14:58

## 2024-09-07 RX ADMIN — Medication 10 ML: at 08:33

## 2024-09-07 RX ADMIN — IPRATROPIUM BROMIDE 2 PUFF: 17 AEROSOL, METERED RESPIRATORY (INHALATION) at 10:39

## 2024-09-07 NOTE — PLAN OF CARE
Problem: Adult Inpatient Plan of Care  Goal: Plan of Care Review  Outcome: Ongoing, Progressing  Goal: Patient-Specific Goal (Individualized)  Outcome: Ongoing, Progressing  Goal: Absence of Hospital-Acquired Illness or Injury  Outcome: Ongoing, Progressing  Intervention: Identify and Manage Fall Risk  Recent Flowsheet Documentation  Taken 9/7/2024 0400 by Al Ghosh RN  Safety Promotion/Fall Prevention: safety round/check completed  Taken 9/7/2024 0200 by Al Ghosh RN  Safety Promotion/Fall Prevention: safety round/check completed  Taken 9/7/2024 0000 by Al Ghosh RN  Safety Promotion/Fall Prevention: safety round/check completed  Taken 9/6/2024 2200 by Al Ghosh RN  Safety Promotion/Fall Prevention: safety round/check completed  Taken 9/6/2024 2100 by Al Ghosh RN  Safety Promotion/Fall Prevention: safety round/check completed  Taken 9/6/2024 2000 by Al Ghosh RN  Safety Promotion/Fall Prevention: safety round/check completed  Taken 9/6/2024 1900 by Al Ghosh RN  Safety Promotion/Fall Prevention: safety round/check completed  Goal: Optimal Comfort and Wellbeing  Outcome: Ongoing, Progressing  Intervention: Provide Person-Centered Care  Recent Flowsheet Documentation  Taken 9/6/2024 2043 by Al Ghosh RN  Trust Relationship/Rapport:   care explained   choices provided   emotional support provided   empathic listening provided  Goal: Readiness for Transition of Care  Outcome: Ongoing, Progressing     Problem: COPD (Chronic Obstructive Pulmonary Disease) Comorbidity  Goal: Maintenance of COPD Symptom Control  Outcome: Ongoing, Progressing     Problem: Heart Failure Comorbidity  Goal: Maintenance of Heart Failure Symptom Control  Outcome: Ongoing, Progressing     Problem: Adjustment to Illness (Heart Failure)  Goal: Optimal Coping  Outcome: Ongoing, Progressing     Problem: Cardiac Output Decreased (Heart Failure)  Goal: Optimal Cardiac Output  Outcome: Ongoing, Progressing     Problem: Dysrhythmia  (Heart Failure)  Goal: Stable Heart Rate and Rhythm  Outcome: Ongoing, Progressing     Problem: Fluid Imbalance (Heart Failure)  Goal: Fluid Balance  Outcome: Ongoing, Progressing     Problem: Functional Ability Impaired (Heart Failure)  Goal: Optimal Functional Ability  Outcome: Ongoing, Progressing     Problem: Oral Intake Inadequate (Heart Failure)  Goal: Optimal Nutrition Intake  Outcome: Ongoing, Progressing     Problem: Respiratory Compromise (Heart Failure)  Goal: Effective Oxygenation and Ventilation  Outcome: Ongoing, Progressing  Intervention: Promote Airway Secretion Clearance  Recent Flowsheet Documentation  Taken 9/6/2024 2043 by Al Ghosh RN  Cough And Deep Breathing: done independently per patient     Problem: Sleep Disordered Breathing (Heart Failure)  Goal: Effective Breathing Pattern During Sleep  Outcome: Ongoing, Progressing     Problem: Fluid Imbalance (Pneumonia)  Goal: Fluid Balance  Outcome: Ongoing, Progressing     Problem: Infection (Pneumonia)  Goal: Resolution of Infection Signs and Symptoms  Outcome: Ongoing, Progressing     Problem: Respiratory Compromise (Pneumonia)  Goal: Effective Oxygenation and Ventilation  Outcome: Ongoing, Progressing  Intervention: Promote Airway Secretion Clearance  Recent Flowsheet Documentation  Taken 9/6/2024 2043 by Al Ghosh RN  Cough And Deep Breathing: done independently per patient     Problem: Fall Injury Risk  Goal: Absence of Fall and Fall-Related Injury  Outcome: Ongoing, Progressing  Intervention: Promote Injury-Free Environment  Recent Flowsheet Documentation  Taken 9/7/2024 0400 by Al Ghosh RN  Safety Promotion/Fall Prevention: safety round/check completed  Taken 9/7/2024 0200 by Al Ghosh RN  Safety Promotion/Fall Prevention: safety round/check completed  Taken 9/7/2024 0000 by Al Ghosh RN  Safety Promotion/Fall Prevention: safety round/check completed  Taken 9/6/2024 2200 by Al Ghosh RN  Safety Promotion/Fall Prevention: safety  round/check completed  Taken 9/6/2024 2100 by Al Ghosh RN  Safety Promotion/Fall Prevention: safety round/check completed  Taken 9/6/2024 2000 by Al Ghosh RN  Safety Promotion/Fall Prevention: safety round/check completed  Taken 9/6/2024 1900 by Al Ghosh RN  Safety Promotion/Fall Prevention: safety round/check completed   Goal Outcome Evaluation:      Patient is a/o x 4. Patient has not had any complaints of pain. Patient has slept most of the shift. Patient has not had adrian adverse events occur.

## 2024-09-07 NOTE — PROGRESS NOTES
[facility] Medicine Services  INPATIENT PROGRESS NOTE    Patient Name: Karoline Prater  Date of Admission: 9/5/2024  Today's Date: 09/07/24  Length of Stay: 2  Primary Care Physician: Irving Alexis MD    Subjective   Chief Complaint: f/u SOB, covid    HPI   Patient seen resting in bed.  Does like he has a little bit more energy today.  Currently on 3 L O2 which is baseline.  Breathing feels close to baseline.  He has not been up out of bed much yet.  Does feel like his strength is near though.  Denies chest pain.  No fevers noted.    Review of Systems   All pertinent negatives and positives are as above. All other systems have been reviewed and are negative unless otherwise stated.     Objective    Temp:  [97.2 °F (36.2 °C)-97.8 °F (36.6 °C)] 97.2 °F (36.2 °C)  Heart Rate:  [83-88] 84  Resp:  [18] 18  BP: (118-128)/(62-80) 128/80  GEN: Awake, alert, interactive, in NAD  HEENT: L eye blind, Anicteric, Trachea midline  Lungs: diminished, no wheezing  Heart: RRR, +S1/s2, no rub  ABD: soft, nt/nd, +BS, no guarding/rebound  Extremities: atraumatic, no cyanosis, trace LE edema b/l  Skin: no rashes   Neuro: AAOx3, no obvious focal deficits  Psych: normal mood & affect    Results Review:  I have reviewed the labs, radiology results, and diagnostic studies.    Laboratory Data:   Results from last 7 days   Lab Units 09/07/24  0436 09/06/24  0605 09/05/24  1450   WBC 10*3/mm3 6.32 3.75 6.35   HEMOGLOBIN g/dL 10.6* 10.1* 10.6*   HEMATOCRIT % 33.8* 32.4* 34.8*   PLATELETS 10*3/mm3 226 224 225        Results from last 7 days   Lab Units 09/07/24  0436 09/06/24  0604 09/05/24  1450   SODIUM mmol/L 141 137 136   POTASSIUM mmol/L 4.0 4.6 4.2   CHLORIDE mmol/L 105 102 103   CO2 mmol/L 26.0 24.0 24.0   BUN mg/dL 38* 31* 28*   CREATININE mg/dL 2.23* 2.13* 2.36*   CALCIUM mg/dL 9.2 9.3 9.3   BILIRUBIN mg/dL 0.3 0.5 0.4   ALK PHOS U/L 92 86 101   ALT (SGPT) U/L 25 26 32   AST (SGOT) U/L  "31 27 32   GLUCOSE mg/dL 104* 139* 113*       Culture Data:   No results found for: \"BLOODCX\", \"URINECX\", \"WOUNDCX\", \"MRSACX\", \"RESPCX\", \"STOOLCX\"    Radiology Data:   Imaging Results (Last 24 Hours)       ** No results found for the last 24 hours. **            I have reviewed the patient's current medications.     Assessment/Plan   Assessment  Active Hospital Problems    Diagnosis     COVID-19 virus infection     Chronic diastolic congestive heart failure     COPD (chronic obstructive pulmonary disease)     Bilateral lower extremity edema     Former smoker     Pneumonia due to infectious organism     Acute on chronic respiratory failure with hypoxia     Pulmonary fibrosis     Stage 3b chronic kidney disease     Malignant neoplasm of upper lobe of right lung        Treatment Plan  #1 COVID 19 viral infection -present COVID positive.  Had increased shortness of breath but essentially has mostly been on baseline home O2 since arrival.  He has been getting Decadron.  He is on remdesivir.  Doing better.    #2 chronic diastolic CHF -chest x-ray on arrival had some increased congestion but hard to note fluid/edema versus COVID.  BNP was slightly elevated.  Did get dose of diuretic and doing well.  Doubt any significant exacerbation.  Continue Toprol-XL    #3 CKD 3B -renal function around baseline.  Monitor.    #4 chronic respiratory failure with hypoxia -underlying chronic pulmonary disease.  Fibrosis.  Presents with COVID.  Noted potential hypoxia in the ER but seems to been on baseline O2 since arrival.  Doing well today and feeling better.  No white count elevation on arrival.  Pro-Skinny negative.  No fevers here.  He has had 3 days of cefepime feeling this can be discontinued at this time.  Doubt secondary bacterial pneumonia given presentation, labs, duration of his symptoms.    #5 history of lung CA -follows with Dr. Michele of hematology/oncology as well as Dr. Cool of radiation oncology in the outpatient " setting.     Medical Decision Making  Number and Complexity of problems: 3 acute; high complexity        Conditions and Status      New to me.  Patient appears nontoxic.  No respiratory distress or conversational dyspnea.  States he is close to baseline.     [facility] Data  External documents reviewed: None Travon reviewed regarding suspected recurrence of lung CA  Cardiac tracing (EKG, telemetry) interpretation: no new EKGs  Radiology interpretation: reports reviewed  Labs reviewed: as above  Any tests that were considered but not ordered: none     Decision rules/scores evaluated (example [id number], Wells, etc): none     Discussed with: patient, nursing staff     Care Planning  Shared decision making: Discussed with patient with agreement to proceed with treatment plan as outlined  Code status and discussions: DO NOT RESUSCITATE     Disposition  Social Determinants of Health that impact treatment or disposition: None apparent at this time  I expect patient to be discharged to home likely tomorrow if stable and doing well.    Electronically signed by Irvign Power DO, 09/07/24, 17:54 CDT.

## 2024-09-07 NOTE — PROGRESS NOTES
"     Inpatient Progress Note        Results Review:   Results from last 7 days   Lab Units 24  0436 24  0604 24  1450   SODIUM mmol/L 141 137 136   POTASSIUM mmol/L 4.0 4.6 4.2   CHLORIDE mmol/L 105 102 103   CO2 mmol/L 26.0 24.0 24.0   BUN mg/dL 38* 31* 28*   CALCIUM mg/dL 9.2 9.3 9.3     Results from last 7 days   Lab Units 24  0436 24  0624  1450   WBC 10*3/mm3 6.32 3.75 6.35   HEMOGLOBIN g/dL 10.6* 10.1* 10.6*   HEMATOCRIT % 33.8* 32.4* 34.8*   PLATELETS 10*3/mm3 226 224 225       Visit Vitals  /72 (BP Location: Left arm, Patient Position: Lying)   Pulse 85   Temp 97.8 °F (36.6 °C) (Oral)   Resp 18   Ht 162.6 cm (64\")   Wt 75.8 kg (167 lb 3.2 oz)   SpO2 99%   BMI 28.70 kg/m²            Medications:   aspirin, 81 mg, Oral, Daily  atorvastatin, 20 mg, Oral, Nightly  [Held by provider] bumetanide, 0.5 mg, Oral, Daily  cefepime, 2,000 mg, Intravenous, Q24H  dexAMETHasone, 6 mg, Intravenous, Daily  enoxaparin, 30 mg, Subcutaneous, Daily  fluticasone, 1 spray, Nasal, Daily  ipratropium, 2 puff, Inhalation, 4x Daily - RT  isosorbide mononitrate, 30 mg, Oral, Daily  metoprolol succinate XL, 50 mg, Oral, Daily  pantoprazole, 40 mg, Oral, Daily  remdesivir, 100 mg, Intravenous, Q24H  sodium bicarbonate, 650 mg, Oral, TID  sodium chloride, 10 mL, Intravenous, Q12H  tamsulosin, 0.4 mg, Oral, Nightly      Pharmacy Consult - Remdesivir,                                              NAME: Karoline Prater  MRN: [id number]  AGE: 84 y.o.            : 1940  ADMISSION DATE: 2024  PRIMARY CARE PROVIDER: Irving Alexis MD  ATTENDING PHYSICIAN: Tono, Irving F, DO                       Reason for Consult:  Acute on chronic respiratory failure, COVID-19 pneumonia    Interval History:    On exam patient resting in bed in no acute distress, he is breathing comfortably on his home oxygen of 3 L.  States his respiratory status has improved and he is near his baseline.  " Today he did complain of being cold overnight.  He is interested in discharge if possible.  No new pulmonary complaints.    Review of Systems:  A full 12-point review of systems was performed and was negative except as documented in the HPI.                       Physical Exam:  General: No acute distress, cooperative  Head: Atraumatic, normocephalic, normal inspection  Eyes: EOMI, normal inspection  ENT: Mucous membranes moist, no thrush  Teeth/gums: Poor dentition  Heart: RRR, no murmur  Lungs: Diminished bilaterally, no coarse breath sounds  MSK: No edema or clubbing  GI/abdominal: Soft, nontender  Neurologic: Alert, oriented.  Cranial nerves II through XII grossly intact.           Imaging Review:   No new imaging for review.                       Problem List:  Acute on chronic hypoxic respiratory failure  COVID-19 infection  COPD  Lung adenocarcinoma recurrence  Former smoker           Recommendations:   -Supplemental oxygen as needed and wean as tolerated, goal O2 sat of 88-92%.  Doing well on his baseline of 3 L  -Continue remdesivir while the patient is admitted.  Since he is improved with Decadron and diuresis and is near his respiratory baseline he does not need to remain admitted just to receive remdesivir.  Though would recommend an ambulatory sat prior to discharge to see if he needs more than his baseline 3 L  -Continue Decadron COVID-19 dosing, continue on discharge  -Agree with diuresis per primary  -Agree we can likely de-escalate his antibiotics, sputum with light growth of normal respiratory nya  -Continue current inhaled therapy, start nebulized medications once able  -Frequent IS and CPT  -PT/OT and ambulation  -Pulmonary clinic follow-up on discharge    Patient appears to be near his respiratory baseline following diuresis and Decadron.  He does not need to remain inpatient for remdesivir.  Recommend ambulatory sat prior to discharge to see if he requires increased oxygen from his baseline  of 3 L.  If the patient does well on his ambulatory oximetry he can discharge from a pulmonary standpoint.    Thank you for allowing us to participate in this patient's care. We will continue to follow.             Cornelio Gordon DO  Pulmonary/Critical Care  9/7/2024  12:43 CDT

## 2024-09-07 NOTE — PLAN OF CARE
Goal Outcome Evaluation:   D/c tomorrow. IV redemsivir given. 3L n/c- VSS. Hourly rounding. Fall risk- safety maintained. Vpaced on tele monitor. IV steroid given. Lovenox VTE. Will continue to monitor until change of shift.

## 2024-09-08 ENCOUNTER — READMISSION MANAGEMENT (OUTPATIENT)
Dept: CALL CENTER | Facility: HOSPITAL | Age: 84
End: 2024-09-08
Payer: MEDICARE

## 2024-09-08 VITALS
WEIGHT: 167.2 LBS | OXYGEN SATURATION: 96 % | HEIGHT: 64 IN | HEART RATE: 92 BPM | TEMPERATURE: 96.4 F | DIASTOLIC BLOOD PRESSURE: 97 MMHG | RESPIRATION RATE: 18 BRPM | BODY MASS INDEX: 28.54 KG/M2 | SYSTOLIC BLOOD PRESSURE: 128 MMHG

## 2024-09-08 PROBLEM — J18.9 PNEUMONIA, UNSPECIFIED ORGANISM: Status: ACTIVE | Noted: 2024-09-08

## 2024-09-08 PROBLEM — D89.832 CYTOKINE RELEASE SYNDROME, GRADE 2: Status: ACTIVE | Noted: 2024-09-08

## 2024-09-08 PROCEDURE — 94799 UNLISTED PULMONARY SVC/PX: CPT

## 2024-09-08 PROCEDURE — 94664 DEMO&/EVAL PT USE INHALER: CPT

## 2024-09-08 PROCEDURE — 99232 SBSQ HOSP IP/OBS MODERATE 35: CPT | Performed by: INTERNAL MEDICINE

## 2024-09-08 PROCEDURE — 63710000001 DEXAMETHASONE PER 0.25 MG: Performed by: INTERNAL MEDICINE

## 2024-09-08 PROCEDURE — 94761 N-INVAS EAR/PLS OXIMETRY MLT: CPT

## 2024-09-08 RX ORDER — DEXAMETHASONE 6 MG/1
6 TABLET ORAL
Qty: 6 TABLET | Refills: 0 | Status: SHIPPED | OUTPATIENT
Start: 2024-09-09 | End: 2024-09-15

## 2024-09-08 RX ORDER — DEXAMETHASONE 4 MG/1
6 TABLET ORAL
Status: DISCONTINUED | OUTPATIENT
Start: 2024-09-08 | End: 2024-09-08 | Stop reason: HOSPADM

## 2024-09-08 RX ORDER — DEXTROMETHORPHAN POLISTIREX 30 MG/5ML
60 SUSPENSION ORAL EVERY 12 HOURS PRN
Qty: 280 ML | Refills: 0 | Status: SHIPPED | OUTPATIENT
Start: 2024-09-08

## 2024-09-08 RX ADMIN — Medication 10 ML: at 10:16

## 2024-09-08 RX ADMIN — METOPROLOL SUCCINATE 50 MG: 50 TABLET, FILM COATED, EXTENDED RELEASE ORAL at 10:15

## 2024-09-08 RX ADMIN — PANTOPRAZOLE SODIUM 40 MG: 40 TABLET, DELAYED RELEASE ORAL at 10:14

## 2024-09-08 RX ADMIN — DEXAMETHASONE 6 MG: 4 TABLET ORAL at 10:15

## 2024-09-08 RX ADMIN — IPRATROPIUM BROMIDE 2 PUFF: 17 AEROSOL, METERED RESPIRATORY (INHALATION) at 06:07

## 2024-09-08 RX ADMIN — ASPIRIN 81 MG: 81 TABLET, COATED ORAL at 10:15

## 2024-09-08 RX ADMIN — FLUTICASONE PROPIONATE 1 SPRAY: 50 SPRAY, METERED NASAL at 10:16

## 2024-09-08 RX ADMIN — ISOSORBIDE MONONITRATE 30 MG: 30 TABLET, EXTENDED RELEASE ORAL at 10:15

## 2024-09-08 RX ADMIN — IPRATROPIUM BROMIDE 2 PUFF: 17 AEROSOL, METERED RESPIRATORY (INHALATION) at 10:05

## 2024-09-08 RX ADMIN — SODIUM BICARBONATE 650 MG: 650 TABLET ORAL at 10:15

## 2024-09-08 NOTE — DISCHARGE SUMMARY
[facility] Medicine Services  DISCHARGE SUMMARY       Date of Admission: 9/5/2024  Date of Discharge:  9/8/2024  Primary Care Physician: Irving Alexis MD    Presenting Problem/History of Present Illness:  SOB/hypoxemia     Final Discharge Diagnoses:  Active Hospital Problems    Diagnosis     Cytokine release syndrome, grade 2     Pneumonia, unspecified organism     COVID-19 virus infection     Chronic heart failure with preserved ejection fraction (HFpEF)     Chronic diastolic congestive heart failure     COPD (chronic obstructive pulmonary disease)     Bilateral lower extremity edema     Former smoker     Pneumonia due to infectious organism     Acute on chronic respiratory failure with hypoxia     Pulmonary fibrosis     Stage 3b chronic kidney disease     Malignant neoplasm of upper lobe of right lung        Consults:   Dr. Gordon, pulmonology    Procedures Performed: none    Pertinent Test Results:   Results for orders placed during the hospital encounter of 06/29/24    Adult Stress Echo W/ Cont or Stress Agent if Necessary Per Protocol    Interpretation Summary    Overall, this is an intermediate risk test for ischemia.    No ECG evidence of myocardial ischemia. Negative clinical evidence of myocardial ischemia. Findings consistent with a normal ECG stress test.    Abnormal stress echo consistent with an intermediate risk study for myocardial ischemia.    Left ventricular systolic function is normal. Left ventricular ejection fraction appears to be 61 - 65%.    The following left ventricular wall segments are hypokinetic: apical septal and apex hypokinetic.      Imaging Results (All)       Procedure Component Value Units Date/Time    XR Chest 1 View [[id number]] Collected: 09/05/24 1520     Updated: 09/05/24 1525    Narrative:      EXAMINATION: XR CHEST 1 VW-     9/5/2024 2:12 PM     HISTORY: Shortness of breath.     1 view chest x-ray.     COMPARISON:  8/28/2024  2 view chest x-ray.  8/3/2024 chest CT.     Diffuse interstitial lung disease.     RIGHT lung nodules. There is a known history of lung carcinoma.     Increased interstitial prominence compatible with worsening interstitial  edema.     Magnified heart size.  Probable cardiac enlargement.     A RIGHT chest wall port is present.       Impression:      1. Low lung volumes with increased interstitial prominence compatible  with pulmonary edema superimposed on chronic change.           This report was signed and finalized on 9/5/2024 3:22 PM by Dr. Danilo Sebastian MD.             LAB RESULTS:      Lab 09/07/24 0436 09/06/24  0605 09/06/24  0604 09/05/24  1450   WBC 6.32 3.75  --  6.35   HEMOGLOBIN 10.6* 10.1*  --  10.6*   HEMATOCRIT 33.8* 32.4*  --  34.8*   PLATELETS 226 224  --  225   NEUTROS ABS 4.55 2.94  --  4.67   IMMATURE GRANS (ABS) 0.05 0.03  --  0.09*   LYMPHS ABS 0.98 0.49*  --  0.72   MONOS ABS 0.58 0.28  --  0.74   EOS ABS 0.15 0.00  --  0.11   MCV 94.7 95.3  --  97.8*   CRP  --   --  6.40*  --    PROCALCITONIN  --   --   --  0.15   LACTATE  --   --   --  1.6         Lab 09/07/24 0436 09/06/24  0604 09/05/24  1450 09/05/24  1445   SODIUM 141 137 136  --    SODIUM, ARTERIAL  --   --   --  138   POTASSIUM 4.0 4.6 4.2  --    CHLORIDE 105 102 103  --    CO2 26.0 24.0 24.0  --    ANION GAP 10.0 11.0 9.0  --    BUN 38* 31* 28*  --    CREATININE 2.23* 2.13* 2.36*  --    EGFR 28.7* 30.3* 26.8*  --    GLUCOSE 104* 139* 113*  --    CALCIUM 9.2 9.3 9.3  --    IONIZED CALCIUM  --   --   --  4.91         Lab 09/07/24 0436 09/06/24  0604 09/05/24  1450   TOTAL PROTEIN 6.0 5.9* 6.5   ALBUMIN 2.8* 2.8* 3.1*   GLOBULIN 3.2 3.1 3.4   ALT (SGPT) 25 26 32   AST (SGOT) 31 27 32   BILIRUBIN 0.3 0.5 0.4   ALK PHOS 92 86 101         Lab 09/05/24  1450   PROBNP 4,734.0*             Lab 09/06/24  0604   FERRITIN 2,555.00*         Lab 09/05/24  1445   PH, ARTERIAL 7.458*   PCO2, ARTERIAL 34.4*   PO2 ART 63.3*   O2 SATURATION ART  93.0*   HCO3 ART 24.3   BASE EXCESS ART 0.8   CARBOXYHEMOGLOBIN 1.4     Brief Urine Lab Results  (Last result in the past 365 days)        Color   Clarity   Blood   Leuk Est   Nitrite   Protein   CREAT   Urine HCG        06/29/24 1028 Yellow   Clear   Negative   Negative   Negative   Negative                 Microbiology Results (last 10 days)       Procedure Component Value - Date/Time    MRSA Screen, PCR (Inpatient) - Swab, Nares [[id number]]  (Normal) Collected: 09/05/24 2300    Lab Status: Final result Specimen: Swab from Nares Updated: 09/06/24 0018     MRSA PCR No MRSA Detected    Narrative:      The negative predictive value of this diagnostic test is high and should only be used to consider de-escalating anti-MRSA therapy. A positive result may indicate colonization with MRSA and must be correlated clinically.    Respiratory Culture - Sputum, Cough [[id number]] Collected: 09/05/24 2245    Lab Status: Final result Specimen: Sputum from Cough Updated: 09/07/24 0921     Respiratory Culture Light growth (2+) Normal respiratory nya. No S. aureus or Pseudomonas aeruginosa detected. Final report.     Gram Stain Moderate (3+) WBCs per low power field      Rare (1+) Epithelial cells per low power field      Moderate (3+) Gram positive cocci      Few (2+) Gram negative bacilli    S. Pneumo Ag Urine or CSF - Urine, Urine, Clean Catch [[id number]]  (Normal) Collected: 09/05/24 1840    Lab Status: Final result Specimen: Urine, Clean Catch Updated: 09/05/24 1953     Strep Pneumo Ag Negative    COVID-19,CEPHEID/JENN,COR/BRYAN/PAD/YOUNG/LAG/ASHER IN-HOUSE,NP SWAB IN TRANSPORT MEDIA 1 HR TAT, RT-PCR - Swab, Nasopharynx [[id number]]  (Abnormal) Collected: 09/05/24 1458    Lab Status: Final result Specimen: Swab from Nasopharynx Updated: 09/05/24 1544     COVID19 Detected    Narrative:      Fact sheet for providers: https://www.fda.gov/media/595348/download     Fact sheet for patients: https://www.fda.gov/media/377971/download  Fact  sheet for providers: https://www.fda.gov/media/671263/download     Fact sheet for patients: https://www.fda.gov/media/583091/download    Blood Culture - Blood, Arm, Right [[id number]]  (Normal) Collected: 09/05/24 1455    Lab Status: Preliminary result Specimen: Blood from Arm, Right Updated: 09/07/24 1516     Blood Culture No growth at 2 days    Blood Culture - Blood, Arm, Right [[id number]]  (Normal) Collected: 09/05/24 1451    Lab Status: Preliminary result Specimen: Blood from Arm, Right Updated: 09/07/24 1516     Blood Culture No growth at 2 days            Hospital Course:   Patient is an 84-year-old male with a history of recurrent lung cancer, CKD, COPD, A-fib/flutter with pacemaker.  Chronically wears 3 L O2.  Presented to the hospital on 9/5 with complaints of shortness of breath and hypoxemia.  He been having increasing shortness of breath and symptoms for 2 days.  Sore throat.  Nonproductive cough.  Reportedly on his home 3 L was satting into the low 80s in the ER.  Chest x-ray showed some increased prominence possibly edema.  Ended up being COVID-positive.  Was admitted to the hospital and started on some antibiotics for potential secondary pneumonia but clinically patient was COVID-positive.  Had no white count or fevers.  Pro-Skinny was negative.  He was treated with 3 days of antibiotics and then discontinued.  Likely did not have secondary infection.  He was also given a dose of diuretic due to his BNP and some concern of possible edema on the chest x-ray.  Again difficult to state given COVID whether or not he had any exacerbation of his CHF.  Less likely.  Again patient was noted to potentially be having hypoxia in the ER but once he got to the floor he is essentially stable on his home 3 L O2.  Patient was started on Decadron.  Pulmonary was consulted.  Remdesivir was added for which she received 2 doses.  Overall however he has been stable over the last 24+ hours and feeling back to baseline.   "Patient feels well and wants to go home today.  Will discharge with close interval outpatient follow-up.  Medications as below.  Seek medical evaluation for any worsening of symptoms or fevers.      Physical Exam on Discharge:  /90 (BP Location: Left arm, Patient Position: Sitting)   Pulse 86   Temp 96.4 °F (35.8 °C) (Oral)   Resp 18   Ht 162.6 cm (64\")   Wt 75.8 kg (167 lb 3.2 oz)   SpO2 98%   BMI 28.70 kg/m²   Physical Exam  GEN: Awake, alert, interactive, in NAD  HEENT: L eye blind, Anicteric, Trachea midline  Lungs: diminished, no wheezing  Heart: RRR, +S1/s2, no rub  ABD: soft, nt/nd, +BS, no guarding/rebound  Extremities: atraumatic, no cyanosis, trace LE edema b/l  Skin: no rashes   Neuro: AAOx3, no obvious focal deficits  Psych: normal mood & affect       Condition on Discharge: stable on home 3L 02    Discharge Disposition:  Home or Self Care    Discharge Medications:     Discharge Medications        New Medications        Instructions Start Date   dexAMETHasone 6 MG tablet  Commonly known as: DECADRON   6 mg, Oral, Daily With Breakfast   Start Date: September 9, 2024     dextromethorphan polistirex ER 30 MG/5ML Suspension Extended Release oral suspension  Commonly known as: DELSYM   60 mg, Oral, Every 12 Hours PRN             Continue These Medications        Instructions Start Date   acetaminophen 500 MG tablet  Commonly known as: TYLENOL   1,000 mg, Oral, Nightly      albuterol sulfate  (90 Base) MCG/ACT inhaler  Commonly known as: PROVENTIL HFA;VENTOLIN HFA;PROAIR HFA   2 puffs, Inhalation, Every 4 Hours PRN      aspirin 81 MG EC tablet   81 mg, Oral, Daily      atorvastatin 20 MG tablet  Commonly known as: LIPITOR   20 mg, Oral, Nightly      bumetanide 0.5 MG tablet  Commonly known as: BUMEX   0.5 mg, Oral, Daily PRN      fluticasone 50 MCG/ACT nasal spray  Commonly known as: FLONASE   1 spray, Nasal, 2 Times Daily      isosorbide mononitrate 30 MG 24 hr tablet  Commonly known as: " IMDUR   30 mg, Oral, Daily, Takes before lunch       melatonin 5 MG tablet tablet   10 mg, Oral, Nightly      metoprolol succinate XL 50 MG 24 hr tablet  Commonly known as: Toprol XL   50 mg, Oral, Daily      multivitamin with minerals tablet tablet   1 tablet, Oral, Daily      pantoprazole 40 MG EC tablet  Commonly known as: Protonix   40 mg, Oral, Daily      sodium bicarbonate 650 MG tablet   650 mg, Oral, 3 Times Daily      Stiolto Respimat 2.5-2.5 MCG/ACT aerosol solution inhaler  Generic drug: tiotropium bromide-olodaterol   2 puffs, Inhalation, Daily      tamsulosin 0.4 MG capsule 24 hr capsule  Commonly known as: FLOMAX   1 capsule, Oral, Nightly               Discharge Diet:   Dietary Orders (From admission, onward)       Start     Ordered    09/05/24 1737  Diet: Cardiac; Healthy Heart (2-3 Na+); Fluid Consistency: Thin (IDDSI 0)  Diet Effective Now        References:    Diet Order [place]   Question Answer Comment   Diets: Cardiac    Cardiac Diet: Healthy Heart (2-3 Na+)    Fluid Consistency: Thin (IDDSI 0)        09/05/24 1736                      Activity at Discharge:   Increase to baseline as tolerated    Follow-up Appointments:   Future Appointments   Date Time Provider Department Center   9/10/2024  1:00 PM Tarik Crocker MD [facility]   9/12/2024 11:45 AM [facility] DEVICE CHECK [facility]   10/18/2024 11:00 AM [facility] DEVICE CHECK [facility]   10/18/2024 11:30 AM Carlitos Bowen MD [facility]   1/7/2025  1:30 PM Danny Cool III, MD [facility]       Test Results Pending at Discharge: none    Electronically signed by Irving Power DO, 09/08/24, 08:40 CDT.    Time: 33 minutes.

## 2024-09-08 NOTE — OUTREACH NOTE
Prep Survey      Flowsheet Row Responses   [facility] facility patient discharged from? [place]   Is LACE score < 7 ? No   Eligibility Readm Mgmt   Discharge diagnosis SOB/hypoxemia, COVID-19   Does the patient have one of the following disease processes/diagnoses(primary or secondary)? Other   Does the patient have Home health ordered? No   Is there a DME ordered? No   Prep survey completed? Yes            Madalyn COON - Registered Nurse

## 2024-09-08 NOTE — PLAN OF CARE
Problem: Adult Inpatient Plan of Care  Goal: Plan of Care Review  Outcome: Ongoing, Progressing  Goal: Patient-Specific Goal (Individualized)  Outcome: Ongoing, Progressing  Goal: Absence of Hospital-Acquired Illness or Injury  Outcome: Ongoing, Progressing  Intervention: Identify and Manage Fall Risk  Recent Flowsheet Documentation  Taken 9/8/2024 0400 by Al Ghosh RN  Safety Promotion/Fall Prevention: safety round/check completed  Taken 9/8/2024 0200 by Al Ghosh RN  Safety Promotion/Fall Prevention: safety round/check completed  Taken 9/8/2024 0000 by Al Ghosh RN  Safety Promotion/Fall Prevention: safety round/check completed  Taken 9/7/2024 2200 by Al Ghosh RN  Safety Promotion/Fall Prevention: safety round/check completed  Taken 9/7/2024 2100 by Al Ghosh RN  Safety Promotion/Fall Prevention: safety round/check completed  Taken 9/7/2024 2000 by Al Ghosh RN  Safety Promotion/Fall Prevention: safety round/check completed  Taken 9/7/2024 1900 by Al Ghosh RN  Safety Promotion/Fall Prevention: safety round/check completed  Intervention: Prevent and Manage VTE (Venous Thromboembolism) Risk  Recent Flowsheet Documentation  Taken 9/7/2024 2031 by Al Ghosh RN  Range of Motion: active ROM (range of motion) encouraged  Goal: Optimal Comfort and Wellbeing  Outcome: Ongoing, Progressing  Intervention: Provide Person-Centered Care  Recent Flowsheet Documentation  Taken 9/7/2024 2031 by Al Ghosh RN  Trust Relationship/Rapport:   care explained   choices provided   emotional support provided   empathic listening provided   questions answered   questions encouraged  Goal: Readiness for Transition of Care  Outcome: Ongoing, Progressing     Problem: COPD (Chronic Obstructive Pulmonary Disease) Comorbidity  Goal: Maintenance of COPD Symptom Control  Outcome: Ongoing, Progressing     Problem: Heart Failure Comorbidity  Goal: Maintenance of Heart Failure Symptom Control  Outcome: Ongoing, Progressing      Problem: Adjustment to Illness (Heart Failure)  Goal: Optimal Coping  Outcome: Ongoing, Progressing     Problem: Cardiac Output Decreased (Heart Failure)  Goal: Optimal Cardiac Output  Outcome: Ongoing, Progressing     Problem: Dysrhythmia (Heart Failure)  Goal: Stable Heart Rate and Rhythm  Outcome: Ongoing, Progressing     Problem: Fluid Imbalance (Heart Failure)  Goal: Fluid Balance  Outcome: Ongoing, Progressing     Problem: Functional Ability Impaired (Heart Failure)  Goal: Optimal Functional Ability  Outcome: Ongoing, Progressing     Problem: Oral Intake Inadequate (Heart Failure)  Goal: Optimal Nutrition Intake  Outcome: Ongoing, Progressing     Problem: Respiratory Compromise (Heart Failure)  Goal: Effective Oxygenation and Ventilation  Outcome: Ongoing, Progressing  Intervention: Promote Airway Secretion Clearance  Recent Flowsheet Documentation  Taken 9/7/2024 2031 by Al Ghosh RN  Cough And Deep Breathing: done independently per patient     Problem: Sleep Disordered Breathing (Heart Failure)  Goal: Effective Breathing Pattern During Sleep  Outcome: Ongoing, Progressing     Problem: Fluid Imbalance (Pneumonia)  Goal: Fluid Balance  Outcome: Ongoing, Progressing     Problem: Infection (Pneumonia)  Goal: Resolution of Infection Signs and Symptoms  Outcome: Ongoing, Progressing     Problem: Respiratory Compromise (Pneumonia)  Goal: Effective Oxygenation and Ventilation  Outcome: Ongoing, Progressing  Intervention: Promote Airway Secretion Clearance  Recent Flowsheet Documentation  Taken 9/7/2024 2031 by Al Ghosh RN  Cough And Deep Breathing: done independently per patient     Problem: Fall Injury Risk  Goal: Absence of Fall and Fall-Related Injury  Outcome: Ongoing, Progressing  Intervention: Promote Injury-Free Environment  Recent Flowsheet Documentation  Taken 9/8/2024 0400 by Al Ghosh RN  Safety Promotion/Fall Prevention: safety round/check completed  Taken 9/8/2024 0200 by Al Ghosh RN  Safety  Promotion/Fall Prevention: safety round/check completed  Taken 9/8/2024 0000 by Al Ghosh RN  Safety Promotion/Fall Prevention: safety round/check completed  Taken 9/7/2024 2200 by Al Ghosh RN  Safety Promotion/Fall Prevention: safety round/check completed  Taken 9/7/2024 2100 by Al Ghosh RN  Safety Promotion/Fall Prevention: safety round/check completed  Taken 9/7/2024 2000 by Al Ghosh RN  Safety Promotion/Fall Prevention: safety round/check completed  Taken 9/7/2024 1900 by Al Ghosh RN  Safety Promotion/Fall Prevention: safety round/check completed   Goal Outcome Evaluation:            Patient is a/o x 4. Patient has not had any complaints of pain. Patient has slept most of the night. Patient has not had any adverse events occur.

## 2024-09-08 NOTE — PROGRESS NOTES
"     Inpatient Progress Note        Results Review:   Results from last 7 days   Lab Units 24  0436 24  0604 24  1450   SODIUM mmol/L 141 137 136   POTASSIUM mmol/L 4.0 4.6 4.2   CHLORIDE mmol/L 105 102 103   CO2 mmol/L 26.0 24.0 24.0   BUN mg/dL 38* 31* 28*   CALCIUM mg/dL 9.2 9.3 9.3     Results from last 7 days   Lab Units 24  0436 24  0624  1450   WBC 10*3/mm3 6.32 3.75 6.35   HEMOGLOBIN g/dL 10.6* 10.1* 10.6*   HEMATOCRIT % 33.8* 32.4* 34.8*   PLATELETS 10*3/mm3 226 224 225       Visit Vitals  /90 (BP Location: Left arm, Patient Position: Sitting)   Pulse 86   Temp 96.4 °F (35.8 °C) (Oral)   Resp 18   Ht 162.6 cm (64\")   Wt 75.8 kg (167 lb 3.2 oz)   SpO2 98%   BMI 28.70 kg/m²            Medications:   aspirin, 81 mg, Oral, Daily  atorvastatin, 20 mg, Oral, Nightly  bumetanide, 0.5 mg, Oral, Daily  dexAMETHasone, 6 mg, Oral, Daily With Breakfast  enoxaparin, 30 mg, Subcutaneous, Daily  fluticasone, 1 spray, Nasal, Daily  ipratropium, 2 puff, Inhalation, 4x Daily - RT  isosorbide mononitrate, 30 mg, Oral, Daily  metoprolol succinate XL, 50 mg, Oral, Daily  pantoprazole, 40 mg, Oral, Daily  remdesivir, 100 mg, Intravenous, Q24H  sodium bicarbonate, 650 mg, Oral, TID  sodium chloride, 10 mL, Intravenous, Q12H  tamsulosin, 0.4 mg, Oral, Nightly      Pharmacy Consult - Remdesivir,                                              NAME: Karoline Prater  MRN: [id number]  AGE: 84 y.o.            : 1940  ADMISSION DATE: 2024  PRIMARY CARE PROVIDER: Irving Alexis MD  ATTENDING PHYSICIAN: Irving Power DO                       Reason for Consult:  Acute on chronic respiratory failure, COVID-19    Interval History:    No acute events overnight, patient resting in bed breathing comfortably on his home oxygen of 3 L.  States he did not sleep well overnight and is experiencing congestion today.  His breathing is currently at baseline.  No new pulmonary " complaints.    Review of Systems:  A full 12-point review of systems was performed and was negative except as documented in the HPI.                       Physical Exam:  General: No acute distress, cooperative  Head: Atraumatic, normocephalic, normal inspection  Eyes: EOMI, normal inspection  ENT: Mucous membranes moist, no thrush  Teeth/gums: Poor dentition  Heart: RRR, no murmur  Lungs: Diminished bilaterally  MSK: No edema or clubbing  GI/abdominal: Soft, nontender  Neurologic: Alert, oriented.  Cranial nerves II through XII grossly intact.           Imaging Review:   No new imaging for review.                       Problem List:  Acute on chronic hypoxic respiratory failure  COVID-19 infection  COPD  Lung adenocarcinoma recurrence  Former smoker           Recommendations:   -Supplemental oxygen as needed and wean as tolerated, goal O2 sat of 88-92%.  Doing well on his baseline of 3 L  -Continue remdesivir while the patient is admitted.  Respiratory status has improved, he does not need to remain admitted to complete the remdesivir course  -Ambulatory oximetry prior to discharge to see if he requires additional oxygen with exertion  -Continue Decadron COVID-19 dosing, continue on discharge  -Agree with diuresis per primary  -Continue current inhaled therapy, start nebulized medications once able  -Frequent IS and CPT, please send home with the patient  -PT/OT and ambulation  -Pulmonary clinic follow-up on discharge     Respiratory status is at or near his baseline.  Appears stable for discharge from a pulmonary standpoint.  He will follow-up with the pulmonary clinic with complete PFTs.  Continue Stiolto and as needed albuterol on discharge.    Thank you for allowing us to participate in this patient's care. We will continue to follow.             Cornelio Gordon DO  Pulmonary/Critical Care  9/8/2024  09:18 CDT

## 2024-09-09 ENCOUNTER — NURSE TRIAGE (OUTPATIENT)
Dept: CALL CENTER | Facility: HOSPITAL | Age: 84
End: 2024-09-09
Payer: MEDICARE

## 2024-09-09 NOTE — THERAPY DISCHARGE NOTE
Acute Care - Occupational Therapy Discharge Summary  [facility]     Patient Name: Karoline Prater  : 1940  MRN: [id number]    Today's Date: 2024                 Admit Date: 2024        OT Recommendation and Plan    Visit Dx:    ICD-10-CM ICD-9-CM   1. Acute on chronic congestive heart failure, unspecified heart failure type  I50.9 428.0   2. Hypoxia  R09.02 799.02   3. Chronic renal impairment, unspecified CKD stage  N18.9 585.9   4. COVID-19  U07.1 079.89   5. Chronic obstructive pulmonary disease, unspecified COPD type  J44.9 496                OT Rehab Goals       Row Name 24 1500             Transfer Goal 1 (OT)    Activity/Assistive Device (Transfer Goal 1, OT) toilet;shower chair  -JJ      [place] Level/Cues Needed (Transfer Goal 1, OT) independent  -JJ      Time Frame (Transfer Goal 1, OT) long term goal (LTG);by discharge  -JJ      Progress/Outcome (Transfer Goal 1, OT) goal not met;discharged from facility  -JJ         Bathing Goal 1 (OT)    Activity/Device (Bathing Goal 1, OT) bathing skills, all  -JJ      [place] Level/Cues Needed (Bathing Goal 1, OT) modified independence  -JJ      Time Frame (Bathing Goal 1, OT) long term goal (LTG);by discharge  -JJ      Progress/Outcomes (Bathing Goal 1, OT) goal not met;discharged from facility  -JJ         Dressing Goal 1 (OT)    Activity/Device (Dressing Goal 1, OT) dressing skills, all  -JJ      [place]/Cues Needed (Dressing Goal 1, OT) independent  -JJ      Time Frame (Dressing Goal 1, OT) long term goal (LTG);by discharge  -JJ      Progress/Outcome (Dressing Goal 1, OT) goal met  -JJ         Problem Specific Goal 1 (OT)    Problem Specific Goal 1 (OT) Pt will implement one energy conservation technique during adl tasks to increase his tolerance for activity and safety during adls.  -JJ      Time Frame (Problem Specific Goal 1, OT) long term goal (LTG);by discharge  -JJ      Progress/Outcome (Problem Specific Goal 1, OT)  Department of Anesthesiology  Postprocedure Note    Patient: Eve Velazquez  MRN: [id number]  YOB: 1971  Date of evaluation: 2/24/2023      Procedure Summary     Date: 02/24/23 Room / Location: [address] [place] [address]    Anesthesia Start: 5458 Anesthesia Stop: 1024    Procedure: EGD BIOPSY (Abdomen) Diagnosis:       Gastroesophageal reflux disease, unspecified whether esophagitis present      (Gastroesophageal reflux disease, unspecified whether esophagitis present [K21.9])    Surgeons: Carson Coon MD Responsible Provider: Nunu Hou MD    Anesthesia Type: MAC ASA Status: 3          Anesthesia Type: No value filed.     Lois Phase I: Lois Score: 10    Lois Phase II: Lois Score: 10      Anesthesia Post Evaluation    Patient location during evaluation: PACU  Patient participation: complete - patient participated  Level of consciousness: awake and alert  Airway patency: patent  Nausea & Vomiting: no nausea and no vomiting  Complications: no  Cardiovascular status: blood pressure returned to baseline  Respiratory status: acceptable  Hydration status: euvolemic  Multimodal analgesia pain management approach goal partially met;discharged from facility  -ALFRED                User Key  (r) = Recorded By, (t) = Taken By, (c) = Cosigned By      Initials Name Provider Type Discipline    Betsey Birmingham OTR/L, CSRS Occupational Therapist OT                                OT Discharge Summary  Anticipated Discharge Disposition (OT): skilled nursing facility  Reason for Discharge: Discharge from facility  Outcomes Achieved: Refer to plan of care for updates on goals achieved  Discharge Destination: Home with assist      Betsey Arias, OTR/L, CSRS  9/9/2024

## 2024-09-09 NOTE — TELEPHONE ENCOUNTER
"Caller discharged from hospital, [facility], yesterday. States had previously scheduled appointment with Dr. Crocker scheduled for tomorrow and needs to change to next week. Denies any c/o.    Caller transferred thru to providers office for schedule change.    Reason for Disposition   Health Information question, no triage required and triager able to answer question    Additional Information   Negative: [1] Caller is not with the adult (patient) AND [2] reporting urgent symptoms   Negative: Lab result questions   Negative: Medication questions   Negative: Caller can't be reached by phone   Negative: Caller has already spoken to PCP or another triager   Negative: RN needs further essential information from caller in order to complete triage   Negative: Requesting regular office appointment   Negative: [1] Caller requesting NON-URGENT health information AND [2] PCP's office is the best resource   Negative: General information question, no triage required and triager able to answer question   Negative: Question about upcoming scheduled test, no triage required and triager able to answer question   Negative: [1] Caller is not with the adult (patient) AND [2] probable NON-URGENT symptoms    Answer Assessment - Initial Assessment Questions  1. REASON FOR CALL or QUESTION: \"What is your reason for calling today?\" or \"How can I best help you?\" or \"What question do you have that I can help answer?\"     Discharged yesterday from [facility] 09/08/2024 and needs to have hospital f/u scheduled /changed from tomorrow to one week.    Protocols used: Information Only Call - No Triage-ADULT-    "

## 2024-09-10 LAB
BACTERIA SPEC AEROBE CULT: NORMAL
BACTERIA SPEC AEROBE CULT: NORMAL

## 2024-09-12 ENCOUNTER — READMISSION MANAGEMENT (OUTPATIENT)
Dept: CALL CENTER | Facility: HOSPITAL | Age: 84
End: 2024-09-12
Payer: MEDICARE

## 2024-09-12 NOTE — OUTREACH NOTE
Medical Week 1 Survey      Flowsheet Row Responses   [facility] patient discharged from? [facility]   Does the patient have one of the following disease processes/diagnoses(primary or secondary)? Other   Week 1 attempt successful? Yes   Call start time 1547   Call end time 1623   Discharge diagnosis SOB/hypoxemia, COVID-19   Meds reviewed with patient/caregiver? Yes   Is the patient having any side effects they believe may be caused by any medication additions or changes? No   Does the patient have all medications ordered at discharge? No   What is keeping the patient from filling the prescriptions? Patient desires to consult PCP, Script on hold per patient   Nursing Interventions Nurse provided patient education, Nurse called pharmacy, Nurse called provider   Prescription comments Pt decided he didn't want to take dexamethasone as concerned it would keep him up at night as had issues at hospital, he placed his prescription on hold.  He is questioning now 4 days post d/c if he should now resume. This RN advised to f/u with MD to discuss.  Called pharmacy and prescription is available if pt is approved to proceed to take and is 78cents to fill.  This RN called PCP office to discuss with staff and they will ask Dr. Alexis in the morning if he wishes for pt to go ahead with steroid or not and call pt to advise.   Pt will then need to call pharmacy to have them fill for him.  Updated pt.   Is the patient taking all medications as directed (includes completed medication regime)? Yes   Medication comments Reviewed prn dosing of diuretics   Does the patient have a primary care provider?  Yes   Does the patient have an appointment with their PCP within 7 days of discharge? Yes  [9/17/24]   Has the patient kept scheduled appointments due by today? N/A   Comments Pt will see Dr. Bowen and [facility] 9/13/24   DME comments Sats mid 90s with rest, will drop to mid 80s with exertion.   Education provided about O2 sat readings  and when to seek care.   Psychosocial issues? No   Did the patient receive a copy of their discharge instructions? Yes   Nursing interventions Reviewed instructions with patient   What is the patient's perception of their health status since discharge? Improving  [Reports his breathing is better but still has some LEIGH at times, monitoring sats as noted.  Pt has had some wt gain--provided wts of 159,162,162 and 164--took his diuretic this am.]   Is the patient/caregiver able to teach back signs and symptoms related to disease process for when to call PCP? Yes   Is the patient/caregiver able to teach back signs and symptoms related to disease process for when to call 911? Yes   Additional teach back comments Advised to pace himself with activities and monitor for worsening SOA, chest pain with inspiration, fever as well as increased edema.  Pt keeping his f/u appts.   Week 1 call completed? Yes   Call end time 4052            ANTOINE DAVIDSON - Registered Nurse

## 2024-09-13 ENCOUNTER — HOSPITAL ENCOUNTER (OUTPATIENT)
Dept: CARDIOLOGY | Facility: HOSPITAL | Age: 84
Discharge: HOME | End: 2024-09-13
Payer: MEDICARE

## 2024-09-13 VITALS
RESPIRATION RATE: 20 BRPM | SYSTOLIC BLOOD PRESSURE: 124 MMHG | DIASTOLIC BLOOD PRESSURE: 66 MMHG | BODY MASS INDEX: 28.85 KG/M2 | WEIGHT: 169 LBS | HEIGHT: 64 IN | OXYGEN SATURATION: 91 % | HEART RATE: 72 BPM

## 2024-09-13 DIAGNOSIS — I10 ESSENTIAL HYPERTENSION: ICD-10-CM

## 2024-09-13 DIAGNOSIS — I50.32 CHRONIC HEART FAILURE WITH PRESERVED EJECTION FRACTION (HFPEF): Primary | ICD-10-CM

## 2024-09-13 DIAGNOSIS — I25.118 CORONARY ARTERY DISEASE OF NATIVE ARTERY OF NATIVE HEART WITH STABLE ANGINA PECTORIS: ICD-10-CM

## 2024-09-13 DIAGNOSIS — I48.92 ATRIAL FLUTTER, UNSPECIFIED TYPE: ICD-10-CM

## 2024-09-13 DIAGNOSIS — J84.10 PULMONARY FIBROSIS: ICD-10-CM

## 2024-09-13 DIAGNOSIS — E78.5 HYPERLIPIDEMIA, UNSPECIFIED HYPERLIPIDEMIA TYPE: ICD-10-CM

## 2024-09-13 LAB
ABSOLUTE LUNG FLUID CONTENT: 57 % (ref 20–35)
ANION GAP SERPL CALCULATED.3IONS-SCNC: 10 MMOL/L (ref 5–15)
BUN SERPL-MCNC: 32 MG/DL (ref 8–23)
BUN/CREAT SERPL: 16.9 (ref 7–25)
CALCIUM SPEC-SCNC: 9.5 MG/DL (ref 8.6–10.5)
CHLORIDE SERPL-SCNC: 103 MMOL/L (ref 98–107)
CO2 SERPL-SCNC: 25 MMOL/L (ref 22–29)
CREAT SERPL-MCNC: 1.89 MG/DL (ref 0.76–1.27)
EGFRCR SERPLBLD CKD-EPI 2021: 35 ML/MIN/1.73
GLUCOSE SERPL-MCNC: 185 MG/DL (ref 65–99)
POTASSIUM SERPL-SCNC: 4.5 MMOL/L (ref 3.5–5.2)
SODIUM SERPL-SCNC: 138 MMOL/L (ref 136–145)

## 2024-09-13 PROCEDURE — 80048 BASIC METABOLIC PNL TOTAL CA: CPT | Performed by: NURSE PRACTITIONER

## 2024-09-13 PROCEDURE — 93005 ELECTROCARDIOGRAM TRACING: CPT | Performed by: NURSE PRACTITIONER

## 2024-09-13 PROCEDURE — 94726 PLETHYSMOGRAPHY LUNG VOLUMES: CPT | Performed by: NURSE PRACTITIONER

## 2024-09-13 PROCEDURE — 93010 ELECTROCARDIOGRAM REPORT: CPT | Performed by: EMERGENCY MEDICINE

## 2024-09-13 PROCEDURE — 99214 OFFICE O/P EST MOD 30 MIN: CPT | Performed by: NURSE PRACTITIONER

## 2024-09-13 NOTE — PATIENT INSTRUCTIONS
Start Jardiance 10mg daily  Continue Bumex 0.5mg as needed  Continue Imdur 30mg daily  Continue Aspirin 81mg daily  Continue Lipitor 20mg daily

## 2024-09-13 NOTE — PROGRESS NOTES
Heart Failure Clinic Consultation Note  Reason For Visit:  HFpEF    Subjective        Karoline Prater is a 84 y.o. male who is referred for HFpEF.  He has been routinely followed by cardiology at [facility] last time being seen in May 2024.  He has had a few recent admissions most recently being from 9/5/2024 to 9/8/2024.  He is found to have pneumonia and there was some concern for pulmonary edema secondary to his HFpEF.  Cardiology was not consulted during this admission for his heart failure.  However, he was seen by Dr. Jessi MD who did perform an AV node ablation with leadless pacemaker insertion.  In the past he has had other admissions for fluid overload with peripheral edema and shortness of breath.  He would receive diuretics while inpatient and would start to feel significantly better.    He notes that he feels fairly well today.  He continues to have some shortness of breath due to pulmonary concerns and at times it will worsen.  This does include dyspnea with exertion. He denies any chest pain. He does complain of some lower extremity edema that is stable since discharge. He notes that he has taken Bumex 2 times since discharge.  He is checking his weights and will take the Bumex for a weight gain of over 2lbs overnight. He notes that he urinates well.    Review of Systems   Constitutional:  Negative for fatigue.   Respiratory:  Positive for shortness of breath.    Cardiovascular:  Negative for chest pain, palpitations and leg swelling.   Neurological:  Negative for dizziness and light-headedness.       Cardiac Studies  Cardiac echo 7/3/2024: Ventricular systolic function normal with an ejection fraction of 61-65%.  Normal ventricular diastolic function noted.  Normal right ventricular cavity size and systolic function noted.  Mild thickening of the aortic valve with no aortic valve regurgitation noted.  No hemodynamically significant aortic valve stenosis present.  No mitral valve  regurgitation or stenosis present.  There is mild mitral annular calcification present.  Mild pulmonary hypertension is present.  Stress echo 7/1/2024: Intermediate risk for ischemia.  No ECG evidence of myocardial ischemia.  Negative clinical evidence of myocardial ischemia.  Abnormal stress echo consistent with intermediate risk for myocardial ischemia.  LV systolic function normal with EF estimated 61-65%.  Hypokinetic apical septal and apex wall segments.  Cardiac echo 1/6/2023: LV systolic function normal with LVEF 61-65%.  LV diastolic function consistent with grade 1 impaired relaxation.  Normal size and function of the right ventricle.  No significant, greater than mild, valvular pathology.  Cardiac echo 7/25/2020: Estimated EF 55%.  Left ventricular systolic function normal left ventricular diastolic function consistent with grade 1 impaired relaxation.  Mild pulmonary hypertension present.  Mild tricuspid valve regurgitation is present.  Cardiac echo 5/31/2019.  LV systolic function is low normal with a EF appearing to be 51-55%.  LA cavity size is borderline dilated.  Left ventricular diastolic function consistent with grade 1 impaired relaxation.    Pertinent PMH  HFpEF  Nonischemic cardiomyopathy  Essential hypertension  Chronic kidney disease  Pulmonary fibrosis  Atrial fibrillation  Status post pacemaker insertion and AV node ablation  COPD  Right lung cancer    Pertinent past medical, surgical, family, and social history were reviewed and updated in the EMR.      Current Outpatient Medications:     acetaminophen (TYLENOL) 500 MG tablet, Take 2 tablets by mouth Every Night., Disp: , Rfl:     albuterol sulfate  (90 Base) MCG/ACT inhaler, Inhale 2 puffs Every 4 (Four) Hours As Needed for Wheezing., Disp: , Rfl:     aspirin 81 MG EC tablet, Take 1 tablet by mouth Daily., Disp: 90 tablet, Rfl: 0    atorvastatin (LIPITOR) 20 MG tablet, Take 1 tablet by mouth Every Night., Disp: 90 tablet, Rfl: 0     "bumetanide (BUMEX) 0.5 MG tablet, Take 1 tablet by mouth Daily As Needed (2 LB's overnight or 3 LB's in a week.  New onset of Shortness of breath or increasing bilateral lower extremity edema) for up to 90 days., Disp: , Rfl:     dextromethorphan polistirex ER (DELSYM) 30 MG/5ML Suspension Extended Release oral suspension, Take 10 mL by mouth Every 12 (Twelve) Hours As Needed (Cough)., Disp: 280 mL, Rfl: 0    fluticasone (FLONASE) 50 MCG/ACT nasal spray, 1 spray into the nostril(s) as directed by provider 2 (Two) Times a Day., Disp: , Rfl:     isosorbide mononitrate (IMDUR) 30 MG 24 hr tablet, Take 1 tablet by mouth Daily. Takes before lunch, Disp: , Rfl:     melatonin 5 MG tablet tablet, Take 2 tablets by mouth Every Night., Disp: , Rfl:     metoprolol succinate XL (Toprol XL) 50 MG 24 hr tablet, Take 1 tablet by mouth Daily., Disp: 90 tablet, Rfl: 0    multivitamin with minerals tablet tablet, Take 1 tablet by mouth Daily., Disp: , Rfl:     pantoprazole (PROTONIX) 40 MG EC tablet, Take 1 tablet by mouth Daily., Disp: 30 tablet, Rfl: 1    sodium bicarbonate 650 MG tablet, Take 1 tablet by mouth 3 (Three) Times a Day., Disp: , Rfl:     tamsulosin (FLOMAX) 0.4 MG capsule 24 hr capsule, Take 1 capsule by mouth Every Night., Disp: , Rfl:     tiotropium bromide-olodaterol (Stiolto Respimat) 2.5-2.5 MCG/ACT aerosol solution inhaler, Inhale 2 puffs Daily., Disp: 4 g, Rfl: 5    empagliflozin (Jardiance) 10 MG tablet tablet, Take 1 tablet by mouth Daily., Disp: 30 tablet, Rfl: 11     Objective   Vital Signs:  /66 (BP Location: Left arm)   Pulse 72   Resp 20   Ht 162.6 cm (64\")   Wt 76.7 kg (169 lb)   SpO2 91% Comment: 3L O2  BMI 29.01 kg/m²   Estimated body mass index is 29.01 kg/m² as calculated from the following:    Height as of this encounter: 162.6 cm (64\").    Weight as of this encounter: 76.7 kg (169 lb).      Neck:      Vascular: No JVD.   Pulmonary:      Effort: Pulmonary effort is normal.      Breath " sounds: Examination of the right-lower field reveals decreased breath sounds. Examination of the left-lower field reveals decreased breath sounds. Decreased breath sounds present.   Cardiovascular:      Normal rate. Regular rhythm. Normal S1. Normal S2.       Murmurs: There is no murmur.      No gallop.  No click. No rub.   Pulses:     Intact distal pulses.   Edema:     Peripheral edema absent.   Abdominal:      Palpations: Abdomen is soft.      Tenderness: There is no abdominal tenderness.   Skin:     General: Skin is warm and dry.   Neurological:      General: No focal deficit present.      Mental Status: Alert and oriented to person, place and time.   Psychiatric:         Behavior: Behavior is cooperative.       Result Review :  The following data was reviewed by: MARITZA Galvez on 09/13/2024:  BMP   BMP          9/6/2024    06:04 9/7/2024    04:36 9/13/2024    13:31   BMP   BUN 31  38  32    Creatinine 2.13  2.23  1.89    Sodium 137  141  138    Potassium 4.6  4.0  4.5    Chloride 102  105  103    CO2 24.0  26.0  25.0    Calcium 9.3  9.2  9.5      ReDS   Lab Results   Component Value Date    ABSOLUTELUNG 57 (A) 09/13/2024         Assessment and Plan   Diagnoses and all orders for this visit:    1. Chronic heart failure with preserved ejection fraction (HFpEF) (Primary)  2. Atrial flutter, unspecified type  3. Pulmonary fibrosis  Overall he appears to be doing well since discharge.  His fluid status is doing well without needing many additional doses of Bumex.  He currently has no specific symptoms of HF today.   - We will start him on Jardiance 10mg daily today to HFpEF  - Continue Bumex 0.5mg as needed.  Take with weight gain of 2lbs overnight or 4lbs in one week.    4. Coronary artery disease of native artery of native heart with stable angina pectoris  5. Essential hypertension  6. Hyperlipidemia, unspecified hyperlipidemia type  He has a listed history of coronary artery disease but doesn't have any  clear anginal symptoms today.  He has an intermediate risk stress Echo on 7/1/2024.  In the future may consider CTA Coronary Arteries for further evaluation but will defer today in the setting of no convening symptoms of angina.   - Continue Aspirin 81mg daily  - Continue Lipitor 20mg daily  - Continue Imdur 30mg daily.       Follow Up   Return in about 2 weeks (around 9/27/2024).    Patient was given instructions and counseling regarding his condition or for health maintenance advice. Please see specific information pulled into the AVS if appropriate.       Blake Dallas, APRN  09/16/24  15:28 CDT

## 2024-09-13 NOTE — PROGRESS NOTES
Heart Failure Clinic    Date:  09/13/24     Vitals:    09/13/24 1351   BP: 124/66   Pulse: 72   Resp: 20   SpO2: 91%        Indication:  Heart Failure     Procedure:  ReDS device sensor unit applied to right side of chest and right side of back.  Appropriate positioning confirmed based off of the unit's calculation.  Chest measurement obtained with the chest size ruler.  Measurement session performed over 45 seconds.      Method of arrival:  Other wheel chair    Weighing self daily:  Yes    Taking medications as prescribed:  Yes    Edema:  1+ankles    Shortness of Air:  Yes 3L O2 walking    Number of pillows used at night:  <2 elevated bed    Results:  ReDS Value=             57             Interpretation:  >46% - markedly elevated lung fluid content    Nata Cruz RN 09/13/24 13:55 CDT

## 2024-09-15 LAB
QT INTERVAL: 424 MS
QTC INTERVAL: 457 MS

## 2024-09-17 ENCOUNTER — TELEPHONE (OUTPATIENT)
Dept: HEMATOLOGY | Age: 84
End: 2024-09-17

## 2024-09-18 ENCOUNTER — OFFICE VISIT (OUTPATIENT)
Dept: HEMATOLOGY | Age: 84
End: 2024-09-18
Payer: MEDICARE

## 2024-09-18 ENCOUNTER — HOSPITAL ENCOUNTER (OUTPATIENT)
Dept: INFUSION THERAPY | Age: 84
Discharge: HOME | End: 2024-09-18
Payer: MEDICARE

## 2024-09-18 VITALS
HEART RATE: 87 BPM | HEIGHT: 64 IN | BODY MASS INDEX: 27.83 KG/M2 | WEIGHT: 163 LBS | SYSTOLIC BLOOD PRESSURE: 124 MMHG | DIASTOLIC BLOOD PRESSURE: 70 MMHG | OXYGEN SATURATION: 91 %

## 2024-09-18 DIAGNOSIS — R59.1 LYMPHADENOPATHY: ICD-10-CM

## 2024-09-18 DIAGNOSIS — Z00.00 HEALTH CARE MAINTENANCE: ICD-10-CM

## 2024-09-18 DIAGNOSIS — R91.1 LUNG NODULE: ICD-10-CM

## 2024-09-18 DIAGNOSIS — C34.11 PRIMARY ADENOCARCINOMA OF UPPER LOBE OF RIGHT LUNG (HCC): ICD-10-CM

## 2024-09-18 DIAGNOSIS — Z45.2 ENCOUNTER FOR CENTRAL LINE CARE: Primary | ICD-10-CM

## 2024-09-18 DIAGNOSIS — C34.11 PRIMARY ADENOCARCINOMA OF UPPER LOBE OF RIGHT LUNG (HCC): Primary | ICD-10-CM

## 2024-09-18 LAB
ALBUMIN SERPL-MCNC: 3.5 G/DL (ref 3.5–5.2)
ALP SERPL-CCNC: 102 U/L (ref 40–129)
ALT SERPL-CCNC: 41 U/L (ref 5–41)
ANION GAP SERPL CALCULATED.3IONS-SCNC: 11 MMOL/L (ref 7–19)
AST SERPL-CCNC: 32 U/L (ref 5–40)
BASOPHILS # BLD: 0.02 K/UL (ref 0.01–0.08)
BASOPHILS NFR BLD: 0.1 % (ref 0.1–1.2)
BILIRUB SERPL-MCNC: 0.5 MG/DL (ref 0–1.2)
BUN SERPL-MCNC: 38 MG/DL (ref 8–23)
CALCIUM SERPL-MCNC: 10.3 MG/DL (ref 8.8–10.2)
CHLORIDE SERPL-SCNC: 103 MMOL/L (ref 98–107)
CO2 SERPL-SCNC: 25 MMOL/L (ref 22–29)
CREAT SERPL-MCNC: 1.3 MG/DL (ref 0.7–1.2)
EOSINOPHIL # BLD: 0 K/UL (ref 0.04–0.54)
EOSINOPHIL NFR BLD: 0 % (ref 0.7–7)
ERYTHROCYTE [DISTWIDTH] IN BLOOD BY AUTOMATED COUNT: 17.2 % (ref 11.6–14.4)
GLUCOSE SERPL-MCNC: 117 MG/DL (ref 70–99)
HCT VFR BLD AUTO: 39.4 % (ref 40.1–51)
HGB BLD-MCNC: 12.4 G/DL (ref 13.7–17.5)
LYMPHOCYTES # BLD: 0.19 K/UL (ref 1.18–3.74)
LYMPHOCYTES NFR BLD: 1.4 % (ref 19.3–53.1)
MCH RBC QN AUTO: 30.5 PG (ref 25.7–32.2)
MCHC RBC AUTO-ENTMCNC: 31.5 G/DL (ref 32.3–36.5)
MCV RBC AUTO: 96.8 FL (ref 79–92.2)
MONOCYTES # BLD: 0.51 K/UL (ref 0.24–0.82)
MONOCYTES NFR BLD: 3.8 % (ref 4.7–12.5)
NEUTROPHILS # BLD: 12.71 K/UL (ref 1.56–6.13)
NEUTS SEG NFR BLD: 93.5 % (ref 34–71.1)
PLATELET # BLD AUTO: 175 K/UL (ref 163–337)
PMV BLD AUTO: 11.2 FL (ref 7.4–10.4)
POTASSIUM SERPL-SCNC: 4.4 MMOL/L (ref 3.5–5.1)
PROT SERPL-MCNC: 6.9 G/DL (ref 6.4–8.3)
RBC # BLD AUTO: 4.07 M/UL (ref 4.63–6.08)
SODIUM SERPL-SCNC: 139 MMOL/L (ref 136–145)
WBC # BLD AUTO: 13.59 K/UL (ref 4.23–9.07)

## 2024-09-18 PROCEDURE — 80053 COMPREHEN METABOLIC PANEL: CPT

## 2024-09-18 PROCEDURE — 2580000003 HC RX 258: Performed by: INTERNAL MEDICINE

## 2024-09-18 PROCEDURE — 6360000002 HC RX W HCPCS: Performed by: INTERNAL MEDICINE

## 2024-09-18 PROCEDURE — 36415 COLL VENOUS BLD VENIPUNCTURE: CPT

## 2024-09-18 PROCEDURE — 99214 OFFICE O/P EST MOD 30 MIN: CPT | Performed by: INTERNAL MEDICINE

## 2024-09-18 PROCEDURE — 1123F ACP DISCUSS/DSCN MKR DOCD: CPT | Performed by: INTERNAL MEDICINE

## 2024-09-18 PROCEDURE — G8417 CALC BMI ABV UP PARAM F/U: HCPCS | Performed by: INTERNAL MEDICINE

## 2024-09-18 PROCEDURE — 3078F DIAST BP <80 MM HG: CPT | Performed by: INTERNAL MEDICINE

## 2024-09-18 PROCEDURE — 1036F TOBACCO NON-USER: CPT | Performed by: INTERNAL MEDICINE

## 2024-09-18 PROCEDURE — 96523 IRRIG DRUG DELIVERY DEVICE: CPT

## 2024-09-18 PROCEDURE — 3074F SYST BP LT 130 MM HG: CPT | Performed by: INTERNAL MEDICINE

## 2024-09-18 PROCEDURE — 99213 OFFICE O/P EST LOW 20 MIN: CPT

## 2024-09-18 PROCEDURE — G8427 DOCREV CUR MEDS BY ELIG CLIN: HCPCS | Performed by: INTERNAL MEDICINE

## 2024-09-18 PROCEDURE — 85025 COMPLETE CBC W/AUTO DIFF WBC: CPT

## 2024-09-18 RX ORDER — SODIUM CHLORIDE 0.9 % (FLUSH) 0.9 %
10 SYRINGE (ML) INJECTION PRN
Status: DISCONTINUED | OUTPATIENT
Start: 2024-09-18 | End: 2024-09-19 | Stop reason: HOSPADM

## 2024-09-18 RX ORDER — SODIUM CHLORIDE 0.9 % (FLUSH) 0.9 %
10 SYRINGE (ML) INJECTION PRN
OUTPATIENT
Start: 2024-09-18

## 2024-09-18 RX ORDER — HEPARIN 100 UNIT/ML
1000 SYRINGE INTRAVENOUS PRN
OUTPATIENT
Start: 2024-09-18

## 2024-09-18 RX ORDER — SODIUM CHLORIDE 0.9 % (FLUSH) 0.9 %
20 SYRINGE (ML) INJECTION PRN
OUTPATIENT
Start: 2024-09-18

## 2024-09-18 RX ORDER — HEPARIN 100 UNIT/ML
500 SYRINGE INTRAVENOUS PRN
Status: CANCELLED | OUTPATIENT
Start: 2024-09-18

## 2024-09-18 RX ORDER — HEPARIN 100 UNIT/ML
500 SYRINGE INTRAVENOUS PRN
Status: DISCONTINUED | OUTPATIENT
Start: 2024-09-18 | End: 2024-09-19 | Stop reason: HOSPADM

## 2024-09-18 RX ORDER — HEPARIN 100 UNIT/ML
1000 SYRINGE INTRAVENOUS PRN
Status: DISCONTINUED | OUTPATIENT
Start: 2024-09-18 | End: 2024-09-19 | Stop reason: HOSPADM

## 2024-09-18 RX ADMIN — SODIUM CHLORIDE, PRESERVATIVE FREE 10 ML: 5 INJECTION INTRAVENOUS at 11:56

## 2024-09-18 RX ADMIN — SODIUM CHLORIDE, PRESERVATIVE FREE 10 ML: 5 INJECTION INTRAVENOUS at 11:55

## 2024-09-18 RX ADMIN — HEPARIN 500 UNITS: 100 SYRINGE at 11:56

## 2024-09-19 ENCOUNTER — HOSPITAL ENCOUNTER (INPATIENT)
Facility: HOSPITAL | Age: 84
LOS: 17 days | Discharge: NURSING, REHAB OR LONG-TERM CARE | DRG: 189 | End: 2024-10-07
Attending: EMERGENCY MEDICINE | Admitting: INTERNAL MEDICINE
Payer: MEDICARE

## 2024-09-19 ENCOUNTER — APPOINTMENT (OUTPATIENT)
Dept: GENERAL RADIOLOGY | Facility: HOSPITAL | Age: 84
DRG: 189 | End: 2024-09-19
Payer: MEDICARE

## 2024-09-19 ENCOUNTER — NURSE TRIAGE (OUTPATIENT)
Dept: CALL CENTER | Facility: HOSPITAL | Age: 84
End: 2024-09-19
Payer: MEDICARE

## 2024-09-19 DIAGNOSIS — I50.32 CHRONIC HEART FAILURE WITH PRESERVED EJECTION FRACTION (HFPEF): ICD-10-CM

## 2024-09-19 DIAGNOSIS — R09.02 HYPOXIA: Primary | ICD-10-CM

## 2024-09-19 DIAGNOSIS — J44.9 CHRONIC OBSTRUCTIVE PULMONARY DISEASE, UNSPECIFIED COPD TYPE: ICD-10-CM

## 2024-09-19 DIAGNOSIS — J98.4 PNEUMONITIS: ICD-10-CM

## 2024-09-19 DIAGNOSIS — Z74.09 IMPAIRED MOBILITY: ICD-10-CM

## 2024-09-19 DIAGNOSIS — Z51.5 ENCOUNTER FOR PALLIATIVE CARE: ICD-10-CM

## 2024-09-19 DIAGNOSIS — N39.0 ACUTE UTI: ICD-10-CM

## 2024-09-19 LAB
ALBUMIN SERPL-MCNC: 3.2 G/DL (ref 3.5–5.2)
ALBUMIN/GLOB SERPL: 0.9 G/DL
ALP SERPL-CCNC: 100 U/L (ref 39–117)
ALT SERPL W P-5'-P-CCNC: 46 U/L (ref 1–41)
ANION GAP SERPL CALCULATED.3IONS-SCNC: 10 MMOL/L (ref 5–15)
AST SERPL-CCNC: 41 U/L (ref 1–40)
B PARAPERT DNA SPEC QL NAA+PROBE: NOT DETECTED
B PERT DNA SPEC QL NAA+PROBE: NOT DETECTED
BACTERIA UR QL AUTO: ABNORMAL /HPF
BASOPHILS # BLD AUTO: 0.02 10*3/MM3 (ref 0–0.2)
BASOPHILS NFR BLD AUTO: 0.2 % (ref 0–1.5)
BILIRUB SERPL-MCNC: 0.6 MG/DL (ref 0–1.2)
BILIRUB UR QL STRIP: NEGATIVE
BUN SERPL-MCNC: 37 MG/DL (ref 8–23)
BUN/CREAT SERPL: 20.7 (ref 7–25)
C PNEUM DNA NPH QL NAA+NON-PROBE: NOT DETECTED
CALCIUM SPEC-SCNC: 9.8 MG/DL (ref 8.6–10.5)
CHLORIDE SERPL-SCNC: 103 MMOL/L (ref 98–107)
CLARITY UR: ABNORMAL
CO2 SERPL-SCNC: 26 MMOL/L (ref 22–29)
COLOR UR: YELLOW
CREAT SERPL-MCNC: 1.79 MG/DL (ref 0.76–1.27)
D-LACTATE SERPL-SCNC: 1.6 MMOL/L (ref 0.5–2)
DEPRECATED RDW RBC AUTO: 60.8 FL (ref 37–54)
EGFRCR SERPLBLD CKD-EPI 2021: 37.4 ML/MIN/1.73
EOSINOPHIL # BLD AUTO: 0.05 10*3/MM3 (ref 0–0.4)
EOSINOPHIL NFR BLD AUTO: 0.5 % (ref 0.3–6.2)
ERYTHROCYTE [DISTWIDTH] IN BLOOD BY AUTOMATED COUNT: 17.3 % (ref 12.3–15.4)
FLUAV SUBTYP SPEC NAA+PROBE: NOT DETECTED
FLUBV RNA ISLT QL NAA+PROBE: NOT DETECTED
GLOBULIN UR ELPH-MCNC: 3.4 GM/DL
GLUCOSE SERPL-MCNC: 113 MG/DL (ref 65–99)
GLUCOSE UR STRIP-MCNC: ABNORMAL MG/DL
HADV DNA SPEC NAA+PROBE: NOT DETECTED
HCOV 229E RNA SPEC QL NAA+PROBE: NOT DETECTED
HCOV HKU1 RNA SPEC QL NAA+PROBE: NOT DETECTED
HCOV NL63 RNA SPEC QL NAA+PROBE: NOT DETECTED
HCOV OC43 RNA SPEC QL NAA+PROBE: NOT DETECTED
HCT VFR BLD AUTO: 41.5 % (ref 37.5–51)
HGB BLD-MCNC: 12.7 G/DL (ref 13–17.7)
HGB UR QL STRIP.AUTO: NEGATIVE
HMPV RNA NPH QL NAA+NON-PROBE: NOT DETECTED
HPIV1 RNA ISLT QL NAA+PROBE: NOT DETECTED
HPIV2 RNA SPEC QL NAA+PROBE: NOT DETECTED
HPIV3 RNA NPH QL NAA+PROBE: NOT DETECTED
HPIV4 P GENE NPH QL NAA+PROBE: NOT DETECTED
HYALINE CASTS UR QL AUTO: ABNORMAL /LPF
IMM GRANULOCYTES # BLD AUTO: 0.15 10*3/MM3 (ref 0–0.05)
IMM GRANULOCYTES NFR BLD AUTO: 1.4 % (ref 0–0.5)
KETONES UR QL STRIP: NEGATIVE
LEUKOCYTE ESTERASE UR QL STRIP.AUTO: ABNORMAL
LYMPHOCYTES # BLD AUTO: 0.37 10*3/MM3 (ref 0.7–3.1)
LYMPHOCYTES NFR BLD AUTO: 3.5 % (ref 19.6–45.3)
M PNEUMO IGG SER IA-ACNC: NOT DETECTED
MAGNESIUM SERPL-MCNC: 1.8 MG/DL (ref 1.6–2.4)
MCH RBC QN AUTO: 29.4 PG (ref 26.6–33)
MCHC RBC AUTO-ENTMCNC: 30.6 G/DL (ref 31.5–35.7)
MCV RBC AUTO: 96.1 FL (ref 79–97)
MONOCYTES # BLD AUTO: 0.44 10*3/MM3 (ref 0.1–0.9)
MONOCYTES NFR BLD AUTO: 4.2 % (ref 5–12)
NEUTROPHILS NFR BLD AUTO: 9.51 10*3/MM3 (ref 1.7–7)
NEUTROPHILS NFR BLD AUTO: 90.2 % (ref 42.7–76)
NITRITE UR QL STRIP: NEGATIVE
NRBC BLD AUTO-RTO: 0 /100 WBC (ref 0–0.2)
PH UR STRIP.AUTO: 6 [PH] (ref 5–8)
PLATELET # BLD AUTO: 170 10*3/MM3 (ref 140–450)
PMV BLD AUTO: 11.7 FL (ref 6–12)
POTASSIUM SERPL-SCNC: 4.4 MMOL/L (ref 3.5–5.2)
PROT SERPL-MCNC: 6.6 G/DL (ref 6–8.5)
PROT UR QL STRIP: ABNORMAL
RBC # BLD AUTO: 4.32 10*6/MM3 (ref 4.14–5.8)
RBC # UR STRIP: ABNORMAL /HPF
REF LAB TEST METHOD: ABNORMAL
RHINOVIRUS RNA SPEC NAA+PROBE: NOT DETECTED
RSV RNA NPH QL NAA+NON-PROBE: NOT DETECTED
SARS-COV-2 RNA NPH QL NAA+NON-PROBE: DETECTED
SODIUM SERPL-SCNC: 139 MMOL/L (ref 136–145)
SP GR UR STRIP: 1.02 (ref 1–1.03)
SQUAMOUS #/AREA URNS HPF: ABNORMAL /HPF
UROBILINOGEN UR QL STRIP: ABNORMAL
WBC # UR STRIP: ABNORMAL /HPF
WBC NRBC COR # BLD AUTO: 10.54 10*3/MM3 (ref 3.4–10.8)

## 2024-09-19 PROCEDURE — 85025 COMPLETE CBC W/AUTO DIFF WBC: CPT | Performed by: EMERGENCY MEDICINE

## 2024-09-19 PROCEDURE — 94761 N-INVAS EAR/PLS OXIMETRY MLT: CPT

## 2024-09-19 PROCEDURE — 71045 X-RAY EXAM CHEST 1 VIEW: CPT

## 2024-09-19 PROCEDURE — 0202U NFCT DS 22 TRGT SARS-COV-2: CPT | Performed by: EMERGENCY MEDICINE

## 2024-09-19 PROCEDURE — 83735 ASSAY OF MAGNESIUM: CPT | Performed by: EMERGENCY MEDICINE

## 2024-09-19 PROCEDURE — 25810000003 SODIUM CHLORIDE 0.9 % SOLUTION: Performed by: EMERGENCY MEDICINE

## 2024-09-19 PROCEDURE — G0378 HOSPITAL OBSERVATION PER HR: HCPCS

## 2024-09-19 PROCEDURE — 87186 SC STD MICRODIL/AGAR DIL: CPT | Performed by: INTERNAL MEDICINE

## 2024-09-19 PROCEDURE — 25010000002 BUMETANIDE PER 0.5 MG: Performed by: EMERGENCY MEDICINE

## 2024-09-19 PROCEDURE — 87077 CULTURE AEROBIC IDENTIFY: CPT | Performed by: INTERNAL MEDICINE

## 2024-09-19 PROCEDURE — 36415 COLL VENOUS BLD VENIPUNCTURE: CPT

## 2024-09-19 PROCEDURE — 83605 ASSAY OF LACTIC ACID: CPT | Performed by: EMERGENCY MEDICINE

## 2024-09-19 PROCEDURE — 80053 COMPREHEN METABOLIC PANEL: CPT | Performed by: EMERGENCY MEDICINE

## 2024-09-19 PROCEDURE — 99285 EMERGENCY DEPT VISIT HI MDM: CPT

## 2024-09-19 PROCEDURE — 87086 URINE CULTURE/COLONY COUNT: CPT | Performed by: INTERNAL MEDICINE

## 2024-09-19 PROCEDURE — 94799 UNLISTED PULMONARY SVC/PX: CPT

## 2024-09-19 PROCEDURE — 87040 BLOOD CULTURE FOR BACTERIA: CPT | Performed by: EMERGENCY MEDICINE

## 2024-09-19 PROCEDURE — 25010000002 CEFTRIAXONE PER 250 MG: Performed by: EMERGENCY MEDICINE

## 2024-09-19 PROCEDURE — 81001 URINALYSIS AUTO W/SCOPE: CPT | Performed by: EMERGENCY MEDICINE

## 2024-09-19 RX ORDER — ACETAMINOPHEN 500 MG
1000 TABLET ORAL ONCE
Status: COMPLETED | OUTPATIENT
Start: 2024-09-19 | End: 2024-09-19

## 2024-09-19 RX ORDER — BUMETANIDE 0.25 MG/ML
1 INJECTION INTRAMUSCULAR; INTRAVENOUS ONCE
Status: COMPLETED | OUTPATIENT
Start: 2024-09-19 | End: 2024-09-19

## 2024-09-19 RX ADMIN — SODIUM CHLORIDE 1000 MG: 900 INJECTION INTRAVENOUS at 20:54

## 2024-09-19 RX ADMIN — ACETAMINOPHEN 1000 MG: 500 TABLET, FILM COATED ORAL at 20:27

## 2024-09-19 RX ADMIN — BUMETANIDE 1 MG: 0.25 INJECTION INTRAMUSCULAR; INTRAVENOUS at 20:28

## 2024-09-19 RX ADMIN — SODIUM CHLORIDE 500 ML: 9 INJECTION, SOLUTION INTRAVENOUS at 20:30

## 2024-09-19 NOTE — ED NOTES
Pt began feeling SOA x3 days ago, states he was exposed to a friend that came to visit approximately 7 days ago who had been Covid-19 positive. Pt states he wears continuous O2 at 3L daily.

## 2024-09-20 PROBLEM — J96.20 ACUTE-ON-CHRONIC RESPIRATORY FAILURE: Status: ACTIVE | Noted: 2024-09-20

## 2024-09-20 LAB
ANION GAP SERPL CALCULATED.3IONS-SCNC: 9 MMOL/L (ref 5–15)
BASOPHILS # BLD AUTO: 0.01 10*3/MM3 (ref 0–0.2)
BASOPHILS NFR BLD AUTO: 0.1 % (ref 0–1.5)
BUN SERPL-MCNC: 39 MG/DL (ref 8–23)
BUN/CREAT SERPL: 21.3 (ref 7–25)
CALCIUM SPEC-SCNC: 8.9 MG/DL (ref 8.6–10.5)
CHLORIDE SERPL-SCNC: 105 MMOL/L (ref 98–107)
CO2 SERPL-SCNC: 24 MMOL/L (ref 22–29)
CREAT SERPL-MCNC: 1.83 MG/DL (ref 0.76–1.27)
DEPRECATED RDW RBC AUTO: 61.3 FL (ref 37–54)
EGFRCR SERPLBLD CKD-EPI 2021: 36.4 ML/MIN/1.73
EOSINOPHIL # BLD AUTO: 0.07 10*3/MM3 (ref 0–0.4)
EOSINOPHIL NFR BLD AUTO: 0.9 % (ref 0.3–6.2)
ERYTHROCYTE [DISTWIDTH] IN BLOOD BY AUTOMATED COUNT: 17.2 % (ref 12.3–15.4)
GLUCOSE SERPL-MCNC: 93 MG/DL (ref 65–99)
HCT VFR BLD AUTO: 34.1 % (ref 37.5–51)
HGB BLD-MCNC: 10.5 G/DL (ref 13–17.7)
IMM GRANULOCYTES # BLD AUTO: 0.11 10*3/MM3 (ref 0–0.05)
IMM GRANULOCYTES NFR BLD AUTO: 1.4 % (ref 0–0.5)
LYMPHOCYTES # BLD AUTO: 0.44 10*3/MM3 (ref 0.7–3.1)
LYMPHOCYTES NFR BLD AUTO: 5.4 % (ref 19.6–45.3)
MCH RBC QN AUTO: 29.6 PG (ref 26.6–33)
MCHC RBC AUTO-ENTMCNC: 30.8 G/DL (ref 31.5–35.7)
MCV RBC AUTO: 96.1 FL (ref 79–97)
MONOCYTES # BLD AUTO: 0.4 10*3/MM3 (ref 0.1–0.9)
MONOCYTES NFR BLD AUTO: 4.9 % (ref 5–12)
NEUTROPHILS NFR BLD AUTO: 7.06 10*3/MM3 (ref 1.7–7)
NEUTROPHILS NFR BLD AUTO: 87.3 % (ref 42.7–76)
PLATELET # BLD AUTO: 145 10*3/MM3 (ref 140–450)
PMV BLD AUTO: 11.9 FL (ref 6–12)
POTASSIUM SERPL-SCNC: 3.9 MMOL/L (ref 3.5–5.2)
PROCALCITONIN SERPL-MCNC: 0.33 NG/ML (ref 0–0.25)
RBC # BLD AUTO: 3.55 10*6/MM3 (ref 4.14–5.8)
SODIUM SERPL-SCNC: 138 MMOL/L (ref 136–145)
WBC NRBC COR # BLD AUTO: 8.09 10*3/MM3 (ref 3.4–10.8)

## 2024-09-20 PROCEDURE — 84145 PROCALCITONIN (PCT): CPT | Performed by: INTERNAL MEDICINE

## 2024-09-20 PROCEDURE — 25010000002 AZITHROMYCIN PER 500 MG: Performed by: INTERNAL MEDICINE

## 2024-09-20 PROCEDURE — 25810000003 SODIUM CHLORIDE 0.9 % SOLUTION 250 ML FLEX CONT: Performed by: INTERNAL MEDICINE

## 2024-09-20 PROCEDURE — 85025 COMPLETE CBC W/AUTO DIFF WBC: CPT | Performed by: FAMILY MEDICINE

## 2024-09-20 PROCEDURE — 94799 UNLISTED PULMONARY SVC/PX: CPT

## 2024-09-20 PROCEDURE — 25010000002 CEFTRIAXONE PER 250 MG: Performed by: INTERNAL MEDICINE

## 2024-09-20 PROCEDURE — 80048 BASIC METABOLIC PNL TOTAL CA: CPT | Performed by: FAMILY MEDICINE

## 2024-09-20 PROCEDURE — 94761 N-INVAS EAR/PLS OXIMETRY MLT: CPT

## 2024-09-20 PROCEDURE — 94640 AIRWAY INHALATION TREATMENT: CPT

## 2024-09-20 PROCEDURE — 97162 PT EVAL MOD COMPLEX 30 MIN: CPT

## 2024-09-20 PROCEDURE — 25010000002 BUMETANIDE PER 0.5 MG: Performed by: FAMILY MEDICINE

## 2024-09-20 RX ORDER — BUDESONIDE AND FORMOTEROL FUMARATE DIHYDRATE 160; 4.5 UG/1; UG/1
2 AEROSOL RESPIRATORY (INHALATION)
Status: DISCONTINUED | OUTPATIENT
Start: 2024-09-20 | End: 2024-10-07 | Stop reason: HOSPADM

## 2024-09-20 RX ORDER — SODIUM CHLORIDE 9 MG/ML
40 INJECTION, SOLUTION INTRAVENOUS AS NEEDED
Status: DISCONTINUED | OUTPATIENT
Start: 2024-09-20 | End: 2024-10-07 | Stop reason: HOSPADM

## 2024-09-20 RX ORDER — PANTOPRAZOLE SODIUM 40 MG/1
40 TABLET, DELAYED RELEASE ORAL DAILY
Status: DISCONTINUED | OUTPATIENT
Start: 2024-09-20 | End: 2024-10-07 | Stop reason: HOSPADM

## 2024-09-20 RX ORDER — ACETAMINOPHEN 160 MG/5ML
650 SOLUTION ORAL EVERY 4 HOURS PRN
Status: DISCONTINUED | OUTPATIENT
Start: 2024-09-20 | End: 2024-10-07 | Stop reason: HOSPADM

## 2024-09-20 RX ORDER — METOPROLOL SUCCINATE 50 MG/1
50 TABLET, EXTENDED RELEASE ORAL DAILY
COMMUNITY
End: 2024-10-07 | Stop reason: HOSPADM

## 2024-09-20 RX ORDER — IPRATROPIUM BROMIDE AND ALBUTEROL SULFATE 2.5; .5 MG/3ML; MG/3ML
3 SOLUTION RESPIRATORY (INHALATION)
Status: DISCONTINUED | OUTPATIENT
Start: 2024-09-20 | End: 2024-09-20 | Stop reason: CLARIF

## 2024-09-20 RX ORDER — ASPIRIN 81 MG/1
81 TABLET ORAL DAILY
Status: DISCONTINUED | OUTPATIENT
Start: 2024-09-20 | End: 2024-10-07 | Stop reason: HOSPADM

## 2024-09-20 RX ORDER — POLYETHYLENE GLYCOL 3350 17 G/17G
17 POWDER, FOR SOLUTION ORAL DAILY PRN
Status: DISCONTINUED | OUTPATIENT
Start: 2024-09-20 | End: 2024-10-04

## 2024-09-20 RX ORDER — TIOTROPIUM BROMIDE AND OLODATEROL 3.124; 2.736 UG/1; UG/1
2 SPRAY, METERED RESPIRATORY (INHALATION)
COMMUNITY
End: 2024-10-07 | Stop reason: HOSPADM

## 2024-09-20 RX ORDER — SODIUM CHLORIDE 0.9 % (FLUSH) 0.9 %
10 SYRINGE (ML) INJECTION AS NEEDED
Status: DISCONTINUED | OUTPATIENT
Start: 2024-09-20 | End: 2024-10-07 | Stop reason: HOSPADM

## 2024-09-20 RX ORDER — DEXTROMETHORPHAN POLISTIREX 30 MG/5ML
60 SUSPENSION ORAL EVERY 12 HOURS PRN
Status: DISCONTINUED | OUTPATIENT
Start: 2024-09-20 | End: 2024-10-07 | Stop reason: HOSPADM

## 2024-09-20 RX ORDER — AMOXICILLIN 250 MG
2 CAPSULE ORAL 2 TIMES DAILY PRN
Status: DISCONTINUED | OUTPATIENT
Start: 2024-09-20 | End: 2024-10-04

## 2024-09-20 RX ORDER — SODIUM BICARBONATE 650 MG/1
650 TABLET ORAL 3 TIMES DAILY
Status: DISCONTINUED | OUTPATIENT
Start: 2024-09-20 | End: 2024-10-07 | Stop reason: HOSPADM

## 2024-09-20 RX ORDER — BISACODYL 5 MG/1
5 TABLET, DELAYED RELEASE ORAL DAILY PRN
Status: DISCONTINUED | OUTPATIENT
Start: 2024-09-20 | End: 2024-10-04

## 2024-09-20 RX ORDER — ACETAMINOPHEN 500 MG
1000 TABLET ORAL NIGHTLY
Status: DISCONTINUED | OUTPATIENT
Start: 2024-09-20 | End: 2024-10-07 | Stop reason: HOSPADM

## 2024-09-20 RX ORDER — TAMSULOSIN HYDROCHLORIDE 0.4 MG/1
0.4 CAPSULE ORAL NIGHTLY
Status: DISCONTINUED | OUTPATIENT
Start: 2024-09-20 | End: 2024-10-07 | Stop reason: HOSPADM

## 2024-09-20 RX ORDER — ACETAMINOPHEN 325 MG/1
650 TABLET ORAL EVERY 4 HOURS PRN
Status: DISCONTINUED | OUTPATIENT
Start: 2024-09-20 | End: 2024-10-07 | Stop reason: HOSPADM

## 2024-09-20 RX ORDER — NITROGLYCERIN 0.4 MG/1
0.4 TABLET SUBLINGUAL
Status: DISCONTINUED | OUTPATIENT
Start: 2024-09-20 | End: 2024-10-07 | Stop reason: HOSPADM

## 2024-09-20 RX ORDER — SODIUM CHLORIDE 0.9 % (FLUSH) 0.9 %
10 SYRINGE (ML) INJECTION EVERY 12 HOURS SCHEDULED
Status: DISCONTINUED | OUTPATIENT
Start: 2024-09-20 | End: 2024-10-04

## 2024-09-20 RX ORDER — ISOSORBIDE MONONITRATE 30 MG/1
30 TABLET, EXTENDED RELEASE ORAL
Status: DISCONTINUED | OUTPATIENT
Start: 2024-09-20 | End: 2024-10-07 | Stop reason: HOSPADM

## 2024-09-20 RX ORDER — GUAIFENESIN 600 MG/1
600 TABLET, EXTENDED RELEASE ORAL EVERY 12 HOURS SCHEDULED
Status: DISCONTINUED | OUTPATIENT
Start: 2024-09-20 | End: 2024-10-07 | Stop reason: HOSPADM

## 2024-09-20 RX ORDER — ACETAMINOPHEN 650 MG/1
650 SUPPOSITORY RECTAL EVERY 4 HOURS PRN
Status: DISCONTINUED | OUTPATIENT
Start: 2024-09-20 | End: 2024-10-07 | Stop reason: HOSPADM

## 2024-09-20 RX ORDER — BUMETANIDE 0.25 MG/ML
1 INJECTION INTRAMUSCULAR; INTRAVENOUS EVERY 12 HOURS
Status: DISCONTINUED | OUTPATIENT
Start: 2024-09-20 | End: 2024-09-26

## 2024-09-20 RX ORDER — ATORVASTATIN CALCIUM 10 MG/1
20 TABLET, FILM COATED ORAL NIGHTLY
Status: DISCONTINUED | OUTPATIENT
Start: 2024-09-20 | End: 2024-10-07 | Stop reason: HOSPADM

## 2024-09-20 RX ORDER — ALBUTEROL SULFATE 90 UG/1
2 INHALANT RESPIRATORY (INHALATION)
Status: DISCONTINUED | OUTPATIENT
Start: 2024-09-20 | End: 2024-09-24

## 2024-09-20 RX ORDER — BISACODYL 10 MG
10 SUPPOSITORY, RECTAL RECTAL DAILY PRN
Status: DISCONTINUED | OUTPATIENT
Start: 2024-09-20 | End: 2024-10-04

## 2024-09-20 RX ORDER — METOPROLOL SUCCINATE 25 MG/1
25 TABLET, EXTENDED RELEASE ORAL DAILY
Status: DISCONTINUED | OUTPATIENT
Start: 2024-09-20 | End: 2024-10-07 | Stop reason: HOSPADM

## 2024-09-20 RX ADMIN — ISOSORBIDE MONONITRATE 30 MG: 30 TABLET, EXTENDED RELEASE ORAL at 10:08

## 2024-09-20 RX ADMIN — METOPROLOL SUCCINATE 25 MG: 25 TABLET, EXTENDED RELEASE ORAL at 10:06

## 2024-09-20 RX ADMIN — ATORVASTATIN CALCIUM 20 MG: 10 TABLET, FILM COATED ORAL at 01:35

## 2024-09-20 RX ADMIN — ATORVASTATIN CALCIUM 20 MG: 10 TABLET, FILM COATED ORAL at 21:08

## 2024-09-20 RX ADMIN — TAMSULOSIN HYDROCHLORIDE 0.4 MG: 0.4 CAPSULE ORAL at 21:12

## 2024-09-20 RX ADMIN — IPRATROPIUM BROMIDE 2 PUFF: 17 AEROSOL, METERED RESPIRATORY (INHALATION) at 06:36

## 2024-09-20 RX ADMIN — GUAIFENESIN 600 MG: 600 TABLET, EXTENDED RELEASE ORAL at 01:35

## 2024-09-20 RX ADMIN — ALBUTEROL SULFATE 2 PUFF: 108 INHALANT RESPIRATORY (INHALATION) at 20:06

## 2024-09-20 RX ADMIN — ASPIRIN 81 MG: 81 TABLET, COATED ORAL at 10:05

## 2024-09-20 RX ADMIN — Medication 10 ML: at 10:07

## 2024-09-20 RX ADMIN — SODIUM BICARBONATE 650 MG: 650 TABLET ORAL at 01:35

## 2024-09-20 RX ADMIN — Medication 10 MG: at 21:08

## 2024-09-20 RX ADMIN — TAMSULOSIN HYDROCHLORIDE 0.4 MG: 0.4 CAPSULE ORAL at 01:35

## 2024-09-20 RX ADMIN — GUAIFENESIN 600 MG: 600 TABLET, EXTENDED RELEASE ORAL at 21:08

## 2024-09-20 RX ADMIN — ALBUTEROL SULFATE 2 PUFF: 108 INHALANT RESPIRATORY (INHALATION) at 06:36

## 2024-09-20 RX ADMIN — EMPAGLIFLOZIN 10 MG: 10 TABLET, FILM COATED ORAL at 10:06

## 2024-09-20 RX ADMIN — SODIUM BICARBONATE 650 MG: 650 TABLET ORAL at 17:53

## 2024-09-20 RX ADMIN — SODIUM CHLORIDE 1000 MG: 900 INJECTION INTRAVENOUS at 17:53

## 2024-09-20 RX ADMIN — BUMETANIDE 1 MG: 0.25 INJECTION INTRAMUSCULAR; INTRAVENOUS at 17:53

## 2024-09-20 RX ADMIN — GUAIFENESIN 600 MG: 600 TABLET, EXTENDED RELEASE ORAL at 10:05

## 2024-09-20 RX ADMIN — ACETAMINOPHEN 1000 MG: 500 TABLET, FILM COATED ORAL at 01:35

## 2024-09-20 RX ADMIN — Medication 10 ML: at 21:09

## 2024-09-20 RX ADMIN — IPRATROPIUM BROMIDE 2 PUFF: 17 AEROSOL, METERED RESPIRATORY (INHALATION) at 20:07

## 2024-09-20 RX ADMIN — IPRATROPIUM BROMIDE 2 PUFF: 17 AEROSOL, METERED RESPIRATORY (INHALATION) at 14:30

## 2024-09-20 RX ADMIN — ALBUTEROL SULFATE 2 PUFF: 108 INHALANT RESPIRATORY (INHALATION) at 14:29

## 2024-09-20 RX ADMIN — BUDESONIDE AND FORMOTEROL FUMARATE DIHYDRATE 2 PUFF: 160; 4.5 AEROSOL RESPIRATORY (INHALATION) at 20:07

## 2024-09-20 RX ADMIN — IPRATROPIUM BROMIDE 2 PUFF: 17 AEROSOL, METERED RESPIRATORY (INHALATION) at 10:10

## 2024-09-20 RX ADMIN — ACETAMINOPHEN 1000 MG: 500 TABLET, FILM COATED ORAL at 21:08

## 2024-09-20 RX ADMIN — BUMETANIDE 1 MG: 0.25 INJECTION INTRAMUSCULAR; INTRAVENOUS at 06:29

## 2024-09-20 RX ADMIN — Medication 10 MG: at 01:35

## 2024-09-20 RX ADMIN — ALBUTEROL SULFATE 2 PUFF: 108 INHALANT RESPIRATORY (INHALATION) at 10:10

## 2024-09-20 RX ADMIN — Medication 10 ML: at 01:35

## 2024-09-20 RX ADMIN — BUDESONIDE AND FORMOTEROL FUMARATE DIHYDRATE 2 PUFF: 160; 4.5 AEROSOL RESPIRATORY (INHALATION) at 06:36

## 2024-09-20 RX ADMIN — SODIUM BICARBONATE 650 MG: 650 TABLET ORAL at 10:05

## 2024-09-20 RX ADMIN — SODIUM BICARBONATE 650 MG: 650 TABLET ORAL at 21:08

## 2024-09-20 RX ADMIN — AZITHROMYCIN DIHYDRATE 500 MG: 500 INJECTION, POWDER, LYOPHILIZED, FOR SOLUTION INTRAVENOUS at 10:02

## 2024-09-20 RX ADMIN — PANTOPRAZOLE SODIUM 40 MG: 40 TABLET, DELAYED RELEASE ORAL at 10:05

## 2024-09-20 NOTE — PLAN OF CARE
Goal Outcome Evaluation:              Outcome Evaluation: Patient admitted to floor this shift.  Alert and oriented x4.  On high flow humified 30 L/ 50% sating in the 90s.  Patient running v-paced onmthe monitor rates 73-85.  No complaints of pain. Voiding well.  Bumex given.

## 2024-09-20 NOTE — PLAN OF CARE
Problem: Adult Inpatient Plan of Care  Goal: Plan of Care Review  Recent Flowsheet Documentation  Taken 9/20/2024 1400 by Cristian Hirsch, PT DPT     Goal Outcome Evaluation:  Plan of Care Reviewed With: patient           Outcome Evaluation: PT amaury complete. He is alert and oriented to self, place and admit situation. He reports living at home with his brother where he was independent in his mobility. Today Mr. Prater needs CGA to stand and to ambulate while on the vapotherm. Ambulates ~ 40 ft total and desat to 85%. Demos weakness and SOB with activity. Once sitting and after ~ 1 minute he was able to recover O2 sat to 92%. PT will cont with mobility, activity tolerance/endurance and strengthening. He plans to d.c back home w his brother, consider HH therapy.      Anticipated Discharge Disposition (PT): home with 24/7 care, home with home health

## 2024-09-20 NOTE — CASE MANAGEMENT/SOCIAL WORK
Discharge Planning Assessment  [facility]     Patient Name: Karoline Prater  MRN: [id number]  Today's Date: 9/20/2024    Admit Date: 9/19/2024        Discharge Needs Assessment       Row Name 09/20/24 1305       Living Environment    People in Home sibling(s)    Current Living Arrangements home    Potentially Unsafe Housing Conditions none    In the past 12 months has the electric, gas, oil, or water company threatened to shut off services in your home? No    Primary Care Provided by self    Provides Primary Care For no one    Family Caregiver if Needed sibling(s)    Family Caregiver Names Keshav    Quality of Family Relationships helpful;involved;supportive    Able to Return to Prior Arrangements yes       Resource/Environmental Concerns    Resource/Environmental Concerns none    Transportation Concerns none       Transportation Needs    In the past 12 months, has lack of transportation kept you from medical appointments or from getting medications? no    In the past 12 months, has lack of transportation kept you from meetings, work, or from getting things needed for daily living? No       Food Insecurity    Within the past 12 months, you worried that your food would run out before you got the money to buy more. Never true    Within the past 12 months, the food you bought just didn't last and you didn't have money to get more. Never true       Transition Planning    Patient/Family Anticipates Transition to home    Patient/Family Anticipated Services at Transition none    Transportation Anticipated family or friend will provide       Discharge Needs Assessment    Equipment Currently Used at Home walker, standard;bath bench;oxygen    Concerns to be Addressed no discharge needs identified    Anticipated Changes Related to Illness none    Equipment Needed After Discharge none    Provided Post Acute Provider List? N/A    Provided Post Acute Provider Quality & Resource List? N/A    Discharge Coordination/Progress Lives  at home with his brother. Has PCP and Rx coverage. No D/C needs identified at this time.                   Discharge Plan       Row Name 09/20/24 1035       Plan    Final Discharge Disposition Code 01 - home or self-care                  Continued Care and Services - Admitted Since 9/19/2024    No active coordination exists for this encounter.       Selected Continued Care - Prior Encounters Includes continued care and service providers with selected services from prior encounters from 6/21/2024 to 9/20/2024      Discharged on 8/8/2024 Admission date: 8/2/2024 - Discharge disposition: Home-Health Care [place]      Home Medical Care       Service Provider Selected Services Address Phone Fax Patient Preferred    [facility] [address] [place] [address] [address], [facility] [address] [phone] --                             Demographic Summary    No documentation.                  Functional Status    No documentation.                  Psychosocial    No documentation.                  Abuse/Neglect    No documentation.                  Legal    No documentation.                  Substance Abuse    No documentation.                  Patient Forms    No documentation.                     Pilo Griffith RN

## 2024-09-20 NOTE — PLAN OF CARE
Goal Outcome Evaluation:  Plan of Care Reviewed With: patient           Outcome Evaluation: afebrile today, VSS, HR [id number], no c/o of pain, continues on vapotherm 30L/50%, SOB with exertion, blind in left eye, voiding well after bumex, pulmonology consulted, IV abx continue, safety maintained

## 2024-09-20 NOTE — THERAPY EVALUATION
Patient Name: Karoline Prater  : 1940    MRN: [id number]                              Today's Date: 2024       Admit Date: 2024    Visit Dx:     ICD-10-CM ICD-9-CM   1. Hypoxia  R09.02 799.02   2. Pneumonitis  J98.4 486   3. Acute UTI  N39.0 599.0   4. Impaired mobility [Z74.09]  Z74.09 799.89     Patient Active Problem List   Diagnosis    Dyspnea    Essential hypertension    NSVT (nonsustained ventricular tachycardia)    Malignant neoplasm of upper lobe of right lung    Stage 3b chronic kidney disease    Pancytopenia due to antineoplastic chemotherapy    Pneumonia due to infectious organism    Function kidney decreased    Cellulitis    Acute on chronic respiratory failure with hypoxia    Pulmonary fibrosis    Macrocytic anemia    Thrombocytopenia    Sepsis    Right pneumothorax    Hyperkalemia    Acute kidney injury superimposed on CKD stage 3b    GERD without esophagitis    A-fib    Former smoker    Chest pain    Tachycardia    Bilateral lower extremity edema    Metastatic adenocarcinoma of the RUL    Chronic diastolic congestive heart failure    COPD (chronic obstructive pulmonary disease)    S/P radiation > 12 weeks    CHF (congestive heart failure)    History of radiation therapy    Chronic heart failure with preserved ejection fraction (HFpEF)    Narrow complex tachycardia    Atrial flutter    Encounter for examination for normal comparison and control in clinical research program    CHF exacerbation    COVID-19 virus infection    Cytokine release syndrome, grade 2    Pneumonia, unspecified organism    Hyperlipidemia    Coronary artery disease    Pneumonitis    Acute-on-chronic respiratory failure     Past Medical History:   Diagnosis Date    Arthritis     Atrial fibrillation with rapid ventricular response 2019    Blindness of left eye     BPH with obstruction/lower urinary tract symptoms     Cancer     stomach & lung    CHF (congestive heart failure)     CKD (chronic kidney  disease) stage 3, GFR 30-59 ml/min     COPD with acute exacerbation 08/03/2024    Coronary artery disease     Elevated cholesterol     Essential hypertension 10/02/2017    Fibrotic lung diseases     GERD (gastroesophageal reflux disease)     Hearing loss     History of transfusion     Hydronephrosis of left kidney     Hyperlipidemia     Hypertension     pt was taken off of all of his medications for BP (atenolol, lisinopril, lasix) because his BP kept bottoming out so his primary dr told him to discontinue them 1-2 months ago (Jan/Feb 2019). pt states he takes no medications currently.    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 Patel is just going to keep an eye on it and not do surgery right now.    Mediastinal adenopathy 10/24/2018    [facility]    Monoclonal gammopathy of unknown significance (MGUS) 09/11/2018    Pancreatic mass     pt states he had this in 2013 but it went away on its own. Now recent CT shows it has come back so he is going to have an ultrasound on 3/13/19.    Shortness of breath      Past Surgical History:   Procedure Laterality Date    ABDOMINAL SURGERY      BRONCHOSCOPY N/A 10/24/2018    Procedure: BRONCHOSCOPY WITH BIOPSY, EBUS;  Surgeon: Gareth Becerra MD;  Location: [facility] OR;  Service: Pulmonary    CHOLECYSTECTOMY      COLONOSCOPY N/A 1/3/2019    Procedure: COLONOSCOPY WITH ANESTHESIA;  Surgeon: Randy Somers DO;  Location: [facility] ENDOSCOPY;  Service: Gastroenterology    CYSTOSCOPY, RETROGRADE PYELOGRAM AND STENT INSERTION Left 3/8/2019    Procedure: CYSTOSCOPY RETROGRADE BILATERAL PYELOGRAM;  Surgeon: Jos Sylvester MD;  Location: [facility] OR;  Service: Urology    ENDOSCOPY N/A 12/11/2018    Procedure: ESOPHAGOGASTRODUODENOSCOPY WITH ANESTHESIA;  Surgeon: Randy Somers DO;  Location: [facility] ENDOSCOPY;  Service:  Gastroenterology    ENDOSCOPY N/A 4/29/2019    Procedure: ESOPHAGOGASTRODUODENOSCOPY WITH ANESTHESIA;  Surgeon: Lilliam Jj MD;  Location: [facility] OR;  Service: Gastroenterology    ENDOSCOPY N/A 5/9/2019    Procedure: ESOPHAGOGASTRODUODENOSCOPY WITH ANESTHESIA;  Surgeon: Pilo Bansal MD;  Location: [facility] ENDOSCOPY;  Service: Gastroenterology    EYE SURGERY Left 1964    FOOT SURGERY Right 1966    joint    FRACTURE SURGERY      PACEMAKER IMPLANTATION Left 8/29/2024    Procedure: Leadless pacemaker implant and AVN ablation;  Surgeon: Carlitos Bowen MD;  Location: [facility] CATH INVASIVE LOCATION;  Service: Cardiovascular;  Laterality: Left;      General Information       Row Name 09/20/24 1400          Physical Therapy Time and Intention    Document Type evaluation  3 days of SOB. Dx: a/c resp failue w hypoxia, Pneumonitis, CHF, COVID + 9/5.  -LEIDY     Mode of Treatment physical therapy  -LEIDY       Row Name 09/20/24 1400          General Information    Patient Profile Reviewed yes  -LEIDY     Prior Level of Function independent:;bed mobility;transfer;gait  -LEIDY     Existing Precautions/Restrictions fall;oxygen therapy device and L/min  COVID Precautions  -LEIDY     Barriers to Rehab medically complex;previous functional deficit;physical barrier  -LEIDY       Row Name 09/20/24 1400          Living Environment    People in Home sibling(s)  -LEIDY       Row Name 09/20/24 1400          Cognition    Orientation Status (Cognition) oriented to;person;place;situation  -LEIDY       Row Name 09/20/24 1400          Safety Issues, Functional Mobility    Impairments Affecting Function (Mobility) balance;endurance/activity tolerance;strength;shortness of breath  -LEIDY               User Key  (r) = Recorded By, (t) = Taken By, (c) = Cosigned By      Initials Name Provider Type    Cristian Coello, PT DPT Physical Therapist                   Mobility       Row Name 09/20/24 1400          Bed Mobility    Bed Mobility supine-sit;sit-supine  -LEIDY      Supine-Sit [place] (Bed Mobility) standby assist  -LEIDY     Sit-Supine [place] (Bed Mobility) standby assist  -LEIDY     Assistive Device (Bed Mobility) head of bed elevated;bed rails  -LEIDY       Row Name 09/20/24 1400          Sit-Stand Transfer    Sit-Stand [place] (Transfers) standby assist;contact guard  -LEIDY       Row Name 09/20/24 1400          Gait/Stairs (Locomotion)    [place] Level (Gait) standby assist;contact guard  -LEIDY     Distance in Feet (Gait) 40  -LEIDY     Deviations/Abnormal Patterns (Gait) gait speed decreased  -LEIDY               User Key  (r) = Recorded By, (t) = Taken By, (c) = Cosigned By      Initials Name Provider Type    Cristian Coello, PT DPT Physical Therapist                   Obj/Interventions       Row Name 09/20/24 1400          Range of Motion Comprehensive    General Range of Motion bilateral lower extremity ROM WFL  -LEIDY       Row Name 09/20/24 1400          Strength Comprehensive (MMT)    Comment, General Manual Muscle Testing (MMT) Assessment B LE grossly 4-/5  -LEIDY       Row Name 09/20/24 1400          Balance    Balance Assessment sitting dynamic balance;standing dynamic balance  -LEIDY     Dynamic Sitting Balance supervision  -LEIDY     Position, Sitting Balance unsupported;sitting edge of bed  -LEIDY     Dynamic Standing Balance standby assist;contact guard  -LEIDY     Position/Device Used, Standing Balance supported;unsupported  -LEIDY               User Key  (r) = Recorded By, (t) = Taken By, (c) = Cosigned By      Initials Name Provider Type    Cristian Coello, PT DPT Physical Therapist                   Goals/Plan       Row Name 09/20/24 1400          Bed Mobility Goal 1 (PT)    Activity/Assistive Device (Bed Mobility Goal 1, PT) sit to supine/supine to sit  -LEIDY     [place] Level/Cues Needed (Bed Mobility Goal 1, PT) supervision required  -LEIDY     Time Frame (Bed Mobility Goal 1, PT) long term goal (LTG);10 days  -LEIDY     Progress/Outcomes (Bed Mobility Goal 1,  PT) new goal  -LEIDY       Row Name 09/20/24 1400          Transfer Goal 1 (PT)    Activity/Assistive Device (Transfer Goal 1, PT) sit-to-stand/stand-to-sit;bed-to-chair/chair-to-bed  -LEIDY     [place] Level/Cues Needed (Transfer Goal 1, PT) supervision required  -LEIDY     Time Frame (Transfer Goal 1, PT) long term goal (LTG);10 days  -LEIDY     Progress/Outcome (Transfer Goal 1, PT) new goal  -LEIDY       Row Name 09/20/24 1400          Gait Training Goal 1 (PT)    Activity/Assistive Device (Gait Training Goal 1, PT) gait (walking locomotion);assistive device use;decrease fall risk;improve balance and speed;increase endurance/gait distance;walker, rolling  -LEIDY     [place] Level (Gait Training Goal 1, PT) supervision required  -LEIDY     Distance (Gait Training Goal 1, PT) 60 ft w O2 sat 90% or greater on scheduled O2  -LEIDY     Time Frame (Gait Training Goal 1, PT) long term goal (LTG);10 days  -LEIDY     Progress/Outcome (Gait Training Goal 1, PT) new goal  -LEIDY       Row Name 09/20/24 1400          Therapy Assessment/Plan (PT)    Planned Therapy Interventions (PT) bed mobility training;transfer training;gait training;balance training;home exercise program;patient/family education;postural re-education;strengthening  -LEIDY               User Key  (r) = Recorded By, (t) = Taken By, (c) = Cosigned By      Initials Name Provider Type    Cristian Coello, PT DPT Physical Therapist                   Clinical Impression       Row Name 09/20/24 1400          Pain    Pretreatment Pain Rating 0/10 - no pain  -LEIDY       Row Name 09/20/24 1400          Plan of Care Review    Plan of Care Reviewed With patient  -LEIDY     Outcome Evaluation PT eval complete. He is alert and oriented to self, place and admit situation. He reports living at home with his brother where he was independent in his mobility. Today Mr. Prater needs CGA to stand and to ambulate while on the vapotherm. Ambulates ~ 40 ft total and desat to 85%. Demos weakness and SOB  with activity. Once sitting and after ~ 1 minute he was able to recover O2 sat to 92%. PT will cont with mobility, activity tolerance/endurance and strengthening. He plans to d.c back home w his brother, consider HH therapy.  -LEIDY       Row Name 09/20/24 1400          Therapy Assessment/Plan (PT)    Patient/Family Therapy Goals Statement (PT) go home  -LEIDY     Rehab Potential (PT) good, to achieve stated therapy goals  -LEIDY     Criteria for Skilled Interventions Met (PT) yes;meets criteria;skilled treatment is necessary  -LEIDY     Therapy Frequency (PT) 2 times/day  -LEIDY     Predicted Duration of Therapy Intervention (PT) until d.c  -LEIDY       Row Name 09/20/24 1400          Positioning and Restraints    Pre-Treatment Position in bed  -LEIDY     Post Treatment Position bed  -LEIDY     In Bed notified nsg;fowlers;call light within reach;encouraged to call for assist;exit alarm on;with other staff  -LEIDY               User Key  (r) = Recorded By, (t) = Taken By, (c) = Cosigned By      Initials Name Provider Type    Cristian Coello, PT DPT Physical Therapist                   Outcome Measures       Row Name 09/20/24 1400 09/20/24 0800       How much help from another person do you currently need...    Turning from your back to your side while in flat bed without using bedrails? 3  -LEIDY 4  -AG    Moving from lying on back to sitting on the side of a flat bed without bedrails? 3  -LEIDY 4  -AG    Moving to and from a bed to a chair (including a wheelchair)? 3  -LEIDY 3  -AG    Standing up from a chair using your arms (e.g., wheelchair, bedside chair)? 3  -LEIDY 3  -AG    Climbing 3-5 steps with a railing? 2  -LEIDY 2  -AG    To walk in hospital room? 3  -LEIDY 3  -AG    AM-PAC 6 Clicks Score (PT) 17  -LEIDY 19  -AG    Highest Level of Mobility Goal 5 --> Static standing  -LEIDY 6 --> Walk 10 steps or more  -AG      Row Name 09/20/24 1400          Functional Assessment    Outcome Measure Options AM-PAC 6 Clicks Basic Mobility (PT)  -LEIDY                User Key  (r) = Recorded By, (t) = Taken By, (c) = Cosigned By      Initials Name Provider Type    Cristian Coello, PT DPT Physical Therapist    Marjan Santizo, RN Registered Nurse                                 Physical Therapy Education       Title: PT OT SLP Therapies (In Progress)       Topic: Physical Therapy (In Progress)       Point: Mobility training (Done)       Learning Progress Summary             Patient Acceptance, E, VU,DU,NR by LEIDY at 9/20/2024 1400    Comment: benefits of activity, progression of PT POC                         Point: Home exercise program (Not Started)       Learner Progress:  Not documented in this visit.              Point: Body mechanics (Not Started)       Learner Progress:  Not documented in this visit.              Point: Precautions (Not Started)       Learner Progress:  Not documented in this visit.                              User Key       Initials Effective Dates Name Provider Type Maria Eugenia FORBES 02/03/23 -  Cristian Hirsch PT DPT Physical Therapist PT                  PT Recommendation and Plan  Planned Therapy Interventions (PT): bed mobility training, transfer training, gait training, balance training, home exercise program, patient/family education, postural re-education, strengthening  Plan of Care Reviewed With: patient  Outcome Evaluation: PT eval complete. He is alert and oriented to self, place and admit situation. He reports living at home with his brother where he was independent in his mobility. Today Mr. Prater needs CGA to stand and to ambulate while on the vapotherm. Ambulates ~ 40 ft total and desat to 85%. Demos weakness and SOB with activity. Once sitting and after ~ 1 minute he was able to recover O2 sat to 92%. PT will cont with mobility, activity tolerance/endurance and strengthening. He plans to d.c back home w his brother, consider HH therapy.     Time Calculation:         PT Charges       Row Name 09/20/24 3102             Time  Calculation    Start Time 1400  -LEIDY      Stop Time 1435  + 5 minutes w chart review. PT with 40 total minutes  -LEIDY      Time Calculation (min) 35 min  -LEIDY      PT Received On 09/20/24  -LEIDY      PT Goal Re-Cert Due Date 09/30/24  -LEIDY         Untimed Charges    PT Eval/Re-eval Minutes 40  -LEIDY         Total Minutes    Untimed Charges Total Minutes 40  -LEIDY       Total Minutes 40  -LEIDY                User Key  (r) = Recorded By, (t) = Taken By, (c) = Cosigned By      Initials Name Provider Type    Cristian Coello, PT DPT Physical Therapist                  Therapy Charges for Today       Code Description Service Date Service Provider Modifiers Qty    83896857158 HC PT EVAL MOD COMPLEXITY 3 9/20/2024 Cristian Hirsch PT DPT GP 1            PT G-Codes  Outcome Measure Options: AM-PAC 6 Clicks Basic Mobility (PT)  AM-PAC 6 Clicks Score (PT): 17  PT Discharge Summary  Anticipated Discharge Disposition (PT): home with 24/7 care, home with home health    Cristian Hirsch, PT DPT  9/20/2024

## 2024-09-20 NOTE — ED NOTES
The following fall interventions were initiated:    [x] Patient and/or family given education   [x] Call light within reach and educated on how to use   [x] Bed rails up per protocol    [x] Bed locked and in the lowest position   [] Bed alarm set and on loudest setting   [x] Fall wrist band applied   [] Non skid footwear applied   [x] Room free of clutter   [x] Patient items within reach   [x] Adequate lighting provided  [] Falls sign present    [] Patient moved closer to nursing station   [] Restraints applied

## 2024-09-20 NOTE — ED NOTES
Nursing report ED to floor  Karoline Prater  84 y.o.  male    HPI:   Chief Complaint   Patient presents with    Shortness of Breath       Admitting doctor:   Wilber Carbajal MD    Consulting provider(s):  Consults       Date and Time Order Name Status Description    9/5/2024  5:36 PM Inpatient Pulmonology Consult Completed     8/29/2024 12:23 AM Inpatient Infectious Diseases Consult Completed     8/27/2024  7:56 AM Inpatient Cardiac Electrophysiology Consult Completed              Admitting diagnosis:   The primary encounter diagnosis was Hypoxia. Diagnoses of Pneumonitis and Acute UTI were also pertinent to this visit.    Code status:   Current Code Status       Date Active Code Status Order ID Comments User Context       Prior            Allergies:   Penicillins    Intake and Output  No intake or output data in the 24 hours ending 09/19/24 2319    Weight:       09/19/24  1843   Weight: 74.3 kg (163 lb 12.8 oz)       Most recent vitals:   Vitals:    09/19/24 1933 09/19/24 2027 09/19/24 2217 09/19/24 2301   BP:  122/77 94/47 100/75   BP Location:       Patient Position:       Pulse: 80  87 76   Resp: 23  26 22   Temp:    99.1 °F (37.3 °C)   TempSrc:    Oral   SpO2: 93%  94% 91%   Weight:       Height:         Oxygen Therapy: .    Active LDAs/IV Access:   Lines, Drains & Airways       Active LDAs       Name Placement date Placement time Site Days    Peripheral IV 09/19/24 2028 Left Antecubital 09/19/24 2028  Antecubital  less than 1                    Labs (abnormal labs have a star):   Labs Reviewed   RESPIRATORY PANEL PCR W/ COVID-19 (SARS-COV-2), NP SWAB IN UTM/VTP, 2 HR TAT - Abnormal; Notable for the following components:       Result Value    COVID19 Detected (*)     All other components within normal limits    Narrative:     In the setting of a positive respiratory panel with a viral infection PLUS a negative procalcitonin without other underlying concern for bacterial infection, consider  observing off antibiotics or discontinuation of antibiotics and continue supportive care. If the respiratory panel is positive for atypical bacterial infection (Bordetella pertussis, Chlamydophila pneumoniae, or Mycoplasma pneumoniae), consider antibiotic de-escalation to target atypical bacterial infection.   COMPREHENSIVE METABOLIC PANEL - Abnormal; Notable for the following components:    Glucose 113 (*)     BUN 37 (*)     Creatinine 1.79 (*)     Albumin 3.2 (*)     ALT (SGPT) 46 (*)     AST (SGOT) 41 (*)     eGFR 37.4 (*)     All other components within normal limits    Narrative:     GFR Normal >60  Chronic Kidney Disease <60  Kidney Failure <15    The GFR formula is only valid for adults with stable renal function between ages 18 and 70.   URINALYSIS W/ MICROSCOPIC IF INDICATED (NO CULTURE) - Abnormal; Notable for the following components:    Appearance, UA Cloudy (*)     Glucose, UA >=1000 mg/dL (3+) (*)     Protein, UA 30 mg/dL (1+) (*)     Leuk Esterase, UA Small (1+) (*)     All other components within normal limits   CBC WITH AUTO DIFFERENTIAL - Abnormal; Notable for the following components:    Hemoglobin 12.7 (*)     MCHC 30.6 (*)     RDW 17.3 (*)     RDW-SD 60.8 (*)     Neutrophil % 90.2 (*)     Lymphocyte % 3.5 (*)     Monocyte % 4.2 (*)     Immature Grans % 1.4 (*)     Neutrophils, Absolute 9.51 (*)     Lymphocytes, Absolute 0.37 (*)     Immature Grans, Absolute 0.15 (*)     All other components within normal limits   URINALYSIS, MICROSCOPIC ONLY - Abnormal; Notable for the following components:    WBC, UA Too Numerous to Count (*)     Bacteria, UA 2+ (*)     All other components within normal limits   LACTIC ACID, PLASMA - Normal   MAGNESIUM - Normal   BLOOD CULTURE   BLOOD CULTURE   CBC AND DIFFERENTIAL    Narrative:     The following orders were created for panel order CBC & Differential.  Procedure                               Abnormality         Status                     ---------                                -----------         ------                     CBC Auto Differential[[id number]]        Abnormal            Final result                 Please view results for these tests on the individual orders.       Meds given in ED:   Medications   acetaminophen (TYLENOL) tablet 1,000 mg (1,000 mg Oral Given 9/19/24 2027)   sodium chloride 0.9 % bolus 500 mL (500 mL Intravenous New Bag 9/19/24 2030)   bumetanide (BUMEX) injection 1 mg (1 mg Intravenous Given 9/19/24 2028)   cefTRIAXone (ROCEPHIN) 1,000 mg in sodium chloride 0.9 % 100 mL MBP (1,000 mg Intravenous New Bag 9/19/24 2054)     No current facility-administered medications for this encounter.       NIH Stroke Scale:   N/a    Isolation/Infection(s):  Enhanced Droplet/Contact    COVID (confirmed)     COVID Testing  +Covid 19 - was also positive on 9/5/24    Nursing report ED to floor:  Mental status: .alert, oriented  Ambulatory status: .up with 1 assist  Precautions: .    ED nurse phone extentsion- ..  [id number]  Report to 4B via telephone

## 2024-09-20 NOTE — H&P
[facility]  HISTORY AND PHYSICAL    Date of Admission: 9/19/2024  Primary Care Physician: Irving Alexis MD    Subjective   Primary Historian: Patient    Chief Complaint: Shortness of breath    History of Present Illness  84-year-old  male with past medical history significant for recurrent lung cancer stage 4, chronic kidney disease, chronic obstructive pulmonary disease, history of atrial fibrillation/atrial flutter with pacemaker, COVID 9/5/2024, chronic respiratory failure on 2 L/min oxygen nasal cannula presents with complaints of worsening shortness of breath for 3 days.  He was discharged from our facility on 9/8/2024 after treatment for COVID and acute on chronic respiratory failure with hypoxia.  States he was doing okay until 3 days ago when he has been feeling more dyspneic.  He presents to the emergency room hypoxemic and was started on oxygen high flow and transition to Vapotherm.  He also had a temp of 101.3 with tachypnea.  COVID PCR is still positive.  Chest x-ray shows chronic interstitial lung disease with superimposed institutional infiltrate in the medial and lateral lower lung zones likely pulmonary edema.  He has a peripheral edema.    Review of Systems   Otherwise complete ROS reviewed and negative except as mentioned in the HPI.    Past Medical History:   Past Medical History:   Diagnosis Date    Arthritis     Atrial fibrillation with rapid ventricular response 05/31/2019    Blindness of left eye     BPH with obstruction/lower urinary tract symptoms     Cancer     stomach & lung    CHF (congestive heart failure)     CKD (chronic kidney disease) stage 3, GFR 30-59 ml/min     COPD with acute exacerbation 08/03/2024    Coronary artery disease     Elevated cholesterol     Essential hypertension 10/02/2017    Fibrotic lung diseases     GERD (gastroesophageal reflux disease)     Hearing loss     History of transfusion      Hydronephrosis of left kidney     Hyperlipidemia     Hypertension     pt was taken off of all of his medications for BP (atenolol, lisinopril, lasix) because his BP kept bottoming out so his primary dr told him to discontinue them 1-2 months ago (Jan/Feb 2019). pt states he takes no medications currently.    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 Patel is just going to keep an eye on it and not do surgery right now.    Mediastinal adenopathy 10/24/2018    [facility]    Monoclonal gammopathy of unknown significance (MGUS) 09/11/2018    Pancreatic mass     pt states he had this in 2013 but it went away on its own. Now recent CT shows it has come back so he is going to have an ultrasound on 3/13/19.    Shortness of breath      Past Surgical History:  Past Surgical History:   Procedure Laterality Date    ABDOMINAL SURGERY      BRONCHOSCOPY N/A 10/24/2018    Procedure: BRONCHOSCOPY WITH BIOPSY, EBUS;  Surgeon: Gareth Becerra MD;  Location: [facility] OR;  Service: Pulmonary    CHOLECYSTECTOMY      COLONOSCOPY N/A 1/3/2019    Procedure: COLONOSCOPY WITH ANESTHESIA;  Surgeon: Randy Somers DO;  Location: [facility] ENDOSCOPY;  Service: Gastroenterology    CYSTOSCOPY, RETROGRADE PYELOGRAM AND STENT INSERTION Left 3/8/2019    Procedure: CYSTOSCOPY RETROGRADE BILATERAL PYELOGRAM;  Surgeon: Jos Sylvester MD;  Location: [facility] OR;  Service: Urology    ENDOSCOPY N/A 12/11/2018    Procedure: ESOPHAGOGASTRODUODENOSCOPY WITH ANESTHESIA;  Surgeon: Randy Somers DO;  Location: [facility] ENDOSCOPY;  Service: Gastroenterology    ENDOSCOPY N/A 4/29/2019    Procedure: ESOPHAGOGASTRODUODENOSCOPY WITH ANESTHESIA;  Surgeon: Lilliam Jj MD;  Location: [facility] OR;  Service: Gastroenterology    ENDOSCOPY N/A 5/9/2019    Procedure: ESOPHAGOGASTRODUODENOSCOPY WITH ANESTHESIA;   Surgeon: Pilo Bansal MD;  Location: [facility] ENDOSCOPY;  Service: Gastroenterology    EYE SURGERY Left 1964    FOOT SURGERY Right 1966    joint    FRACTURE SURGERY      PACEMAKER IMPLANTATION Left 8/29/2024    Procedure: Leadless pacemaker implant and AVN ablation;  Surgeon: Carlitos Bowen MD;  Location: [facility] CATH INVASIVE LOCATION;  Service: Cardiovascular;  Laterality: Left;     Social History:  reports that he quit smoking about 20 years ago. His smoking use included cigarettes. He started smoking about 69 years ago. He quit smokeless tobacco use about 54 years ago.  His smokeless tobacco use included chew. He reports that he does not drink alcohol and does not use drugs.    Family History: family history includes Hypertension in his mother; Leukemia in his father.       Allergies:  Allergies   Allergen Reactions    Penicillins Hives       Medications:  Prior to Admission medications    Medication Sig Start Date End Date Taking? Authorizing Provider   acetaminophen (TYLENOL) 500 MG tablet Take 2 tablets by mouth Every Night.   Yes ProviderEliecer MD   albuterol sulfate  (90 Base) MCG/ACT inhaler Inhale 2 puffs Every 4 (Four) Hours As Needed for Wheezing.   Yes ProviderEliecer MD   aspirin 81 MG EC tablet Take 1 tablet by mouth Daily. 7/4/24  Yes Zachary Lloyd MD   atorvastatin (LIPITOR) 20 MG tablet Take 1 tablet by mouth Every Night. 7/3/24  Yes Zachary Lloyd MD   bumetanide (BUMEX) 0.5 MG tablet Take 1 tablet by mouth Daily As Needed (2 LB's overnight or 3 LB's in a week.  New onset of Shortness of breath or increasing bilateral lower extremity edema) for up to 90 days. 8/30/24 11/28/24 Yes Farida APRN   dextromethorphan polistirex ER (DELSYM) 30 MG/5ML Suspension Extended Release oral suspension Take 10 mL by mouth Every 12 (Twelve) Hours As Needed (Cough). 9/8/24  Yes Irving Power DO   empagliflozin (Jardiance) 10 MG tablet tablet Take 1 tablet by mouth Daily.  "9/13/24  Yes Blake Dallas APRN   fluticasone (FLONASE) 50 MCG/ACT nasal spray Administer 1 spray into the nostril(s) as directed by provider 2 (Two) Times a Day.   Yes Eliecer Schultz MD   isosorbide mononitrate (IMDUR) 30 MG 24 hr tablet Take 1 tablet by mouth Daily. Takes before lunch   Yes Eliecer Schultz MD   melatonin 5 MG tablet tablet Take 2 tablets by mouth Every Night.   Yes Eliecer Schultz MD   metoprolol succinate XL (Toprol XL) 50 MG 24 hr tablet Take 1 tablet by mouth Daily.  Patient taking differently: Take 0.5 tablets by mouth Daily. 7/4/24  Yes Zachary Lloyd MD   multivitamin with minerals tablet tablet Take 1 tablet by mouth Daily.   Yes Eliecer Schultz MD   pantoprazole (PROTONIX) 40 MG EC tablet Take 1 tablet by mouth Daily. 4/30/19  Yes Hang Reddy DO   sodium bicarbonate 650 MG tablet Take 1 tablet by mouth 3 (Three) Times a Day.   Yes Eliecer Schultz MD   tamsulosin (FLOMAX) 0.4 MG capsule 24 hr capsule Take 1 capsule by mouth Every Night.   Yes Eliecer Schultz MD     I have utilized all available immediate resources to obtain, update, or review the patient's current medications (including all prescriptions, over-the-counter products, herbals, cannabis/cannabidiol products, and vitamin/mineral/dietary (nutritional) supplements).    Objective     Vital Signs: /86 (BP Location: Right arm, Patient Position: Lying)   Pulse 86   Temp 97.9 °F (36.6 °C) (Oral)   Resp 24   Ht 162.6 cm (64\")   Wt 74.3 kg (163 lb 14.4 oz)   SpO2 94%   BMI 28.13 kg/m²   Physical Exam  Constitutional:       Appearance: He is well-developed. He is ill-appearing. He is not toxic-appearing or diaphoretic.   HENT:      Head: Normocephalic and atraumatic.      Right Ear: External ear normal.      Left Ear: External ear normal.      Nose: Nose normal.      Mouth/Throat:      Mouth: Mucous membranes are dry.   Eyes:      General:         Right eye: No discharge.    "      Left eye: No discharge.      Conjunctiva/sclera: Conjunctivae normal.      Comments: Left eye EOM, pupil normal.   Neck:      Vascular: No JVD.   Cardiovascular:      Rate and Rhythm: Regular rhythm. Tachycardia present.      Heart sounds: Normal heart sounds. No murmur heard.  Pulmonary:      Effort: Pulmonary effort is normal. No respiratory distress.      Breath sounds: Rales present. No wheezing.   Chest:      Chest wall: No tenderness.   Abdominal:      General: Bowel sounds are normal. There is no distension.      Palpations: Abdomen is soft.      Tenderness: There is no abdominal tenderness. There is no guarding or rebound.   Musculoskeletal:         General: No tenderness or deformity. Normal range of motion.      Cervical back: Normal range of motion and neck supple. No rigidity.      Right lower leg: Edema present.      Left lower leg: Edema present.   Skin:     General: Skin is warm and dry.      Findings: No rash.   Neurological:      General: No focal deficit present.      Mental Status: He is alert and oriented to person, place, and time. Mental status is at baseline.      Cranial Nerves: No cranial nerve deficit.      Sensory: No sensory deficit.      Motor: No abnormal muscle tone.      Deep Tendon Reflexes: Reflexes normal.   Psychiatric:         Mood and Affect: Mood normal.         Behavior: Behavior normal.     Results Reviewed:  Lab Results (last 24 hours)       Procedure Component Value Units Date/Time    Respiratory Panel PCR w/COVID-19(SARS-CoV-2) JORDYN/MATILDE/BRYAN/PAD/COR/ASHER In-House, NP Swab in UTM/VTM, 2 HR TAT - Swab, Nasopharynx [[id number]]  (Abnormal) Collected: 09/19/24 1944    Specimen: Swab from Nasopharynx Updated: 09/19/24 2113     ADENOVIRUS, PCR Not Detected     Coronavirus 229E Not Detected     Coronavirus HKU1 Not Detected     Coronavirus NL63 Not Detected     Coronavirus OC43 Not Detected     COVID19 Detected     Human Metapneumovirus Not Detected     Human  Rhinovirus/Enterovirus Not Detected     Influenza A PCR Not Detected     Influenza B PCR Not Detected     Parainfluenza Virus 1 Not Detected     Parainfluenza Virus 2 Not Detected     Parainfluenza Virus 3 Not Detected     Parainfluenza Virus 4 Not Detected     RSV, PCR Not Detected     Bordetella pertussis pcr Not Detected     Bordetella parapertussis PCR Not Detected     Chlamydophila pneumoniae PCR Not Detected     Mycoplasma pneumo by PCR Not Detected    Narrative:      In the setting of a positive respiratory panel with a viral infection PLUS a negative procalcitonin without other underlying concern for bacterial infection, consider observing off antibiotics or discontinuation of antibiotics and continue supportive care. If the respiratory panel is positive for atypical bacterial infection (Bordetella pertussis, Chlamydophila pneumoniae, or Mycoplasma pneumoniae), consider antibiotic de-escalation to target atypical bacterial infection.    Blood Culture - Blood, Arm, Left [[id number]] Collected: 09/19/24 2025    Specimen: Blood from Arm, Left Updated: 09/19/24 2056    Urinalysis With Microscopic If Indicated (No Culture) - Urine, Clean Catch [[id number]]  (Abnormal) Collected: 09/19/24 1943    Specimen: Urine, Clean Catch Updated: 09/19/24 2023     Color, UA Yellow     Appearance, UA Cloudy     pH, UA 6.0     Specific Gravity, UA 1.016     Glucose, UA >=1000 mg/dL (3+)     Ketones, UA Negative     Bilirubin, UA Negative     Blood, UA Negative     Protein, UA 30 mg/dL (1+)     Leuk Esterase, UA Small (1+)     Nitrite, UA Negative     Urobilinogen, UA 1.0 E.U./dL    Urinalysis, Microscopic Only - Urine, Clean Catch [[id number]]  (Abnormal) Collected: 09/19/24 1943    Specimen: Urine, Clean Catch Updated: 09/19/24 2023     RBC, UA 0-2 /HPF      WBC, UA Too Numerous to Count /HPF      Bacteria, UA 2+ /HPF      Squamous Epithelial Cells, UA None Seen /HPF      Hyaline Casts, UA None Seen /LPF      Methodology Automated  Microscopy    Lactic Acid, Plasma [[id number]]  (Normal) Collected: 09/19/24 1928    Specimen: Blood from Arm, Left Updated: 09/19/24 2006     Lactate 1.6 mmol/L     Comprehensive Metabolic Panel [[id number]]  (Abnormal) Collected: 09/19/24 1928    Specimen: Blood from Arm, Left Updated: 09/19/24 2006     Glucose 113 mg/dL      BUN 37 mg/dL      Creatinine 1.79 mg/dL      Sodium 139 mmol/L      Potassium 4.4 mmol/L      Chloride 103 mmol/L      CO2 26.0 mmol/L      Calcium 9.8 mg/dL      Total Protein 6.6 g/dL      Albumin 3.2 g/dL      ALT (SGPT) 46 U/L      AST (SGOT) 41 U/L      Alkaline Phosphatase 100 U/L      Total Bilirubin 0.6 mg/dL      Globulin 3.4 gm/dL      A/G Ratio 0.9 g/dL      BUN/Creatinine Ratio 20.7     Anion Gap 10.0 mmol/L      eGFR 37.4 mL/min/1.73     Narrative:      GFR Normal >60  Chronic Kidney Disease <60  Kidney Failure <15    The GFR formula is only valid for adults with stable renal function between ages 18 and 70.    Magnesium [[id number]]  (Normal) Collected: 09/19/24 1928    Specimen: Blood from Arm, Left Updated: 09/19/24 2006     Magnesium 1.8 mg/dL     Blood Culture - Blood, Arm, Left [[id number]] Collected: 09/19/24 1928    Specimen: Blood from Arm, Left Updated: 09/19/24 1945    CBC & Differential [[id number]]  (Abnormal) Collected: 09/19/24 1928    Specimen: Blood from Arm, Left Updated: 09/19/24 1944    Narrative:      The following orders were created for panel order CBC & Differential.  Procedure                               Abnormality         Status                     ---------                               -----------         ------                     CBC Auto Differential[[id number]]        Abnormal            Final result                 Please view results for these tests on the individual orders.    CBC Auto Differential [[id number]]  (Abnormal) Collected: 09/19/24 1928    Specimen: Blood from Arm, Left Updated: 09/19/24 1944     WBC 10.54 10*3/mm3      RBC 4.32 10*6/mm3       Hemoglobin 12.7 g/dL      Hematocrit 41.5 %      MCV 96.1 fL      MCH 29.4 pg      MCHC 30.6 g/dL      RDW 17.3 %      RDW-SD 60.8 fl      MPV 11.7 fL      Platelets 170 10*3/mm3      Neutrophil % 90.2 %      Lymphocyte % 3.5 %      Monocyte % 4.2 %      Eosinophil % 0.5 %      Basophil % 0.2 %      Immature Grans % 1.4 %      Neutrophils, Absolute 9.51 10*3/mm3      Lymphocytes, Absolute 0.37 10*3/mm3      Monocytes, Absolute 0.44 10*3/mm3      Eosinophils, Absolute 0.05 10*3/mm3      Basophils, Absolute 0.02 10*3/mm3      Immature Grans, Absolute 0.15 10*3/mm3      nRBC 0.0 /100 WBC           Imaging Results (Last 24 Hours)       Procedure Component Value Units Date/Time    XR Chest 1 View [[id number]] Collected: 09/19/24 1921     Updated: 09/19/24 1926    Narrative:      EXAMINATION:  XR CHEST 1 VW-  9/19/2024 6:18 PM     HISTORY: Shortness of air and fever.     COMPARISON: 9/5/2024.     TECHNIQUE: Single view AP image.     FINDINGS: There is underlying interstitial lung disease. There are  superimposed interstitial infiltrates in the mid and lower lung zones.  There is a small pleural effusion on the right. There is a port catheter  on the right. Heart size is within normal limits. The thoracic aorta is  atheromatous.          Impression:      1. Chronic interstitial lung disease.  2. Superimposed interstitial infiltrate in the mid and lower lung zones,  likely pulmonary edema.  3. Small right pleural effusion.           This report was signed and finalized on 9/19/2024 7:23 PM by Dr. Ramos Lagos MD.             I have personally reviewed and interpreted the radiology studies and ECG obtained at time of admission.     Assessment / Plan   Assessment:   Active Hospital Problems    Diagnosis     **Acute on chronic respiratory failure with hypoxia     Pneumonitis     COVID-19 virus infection     Chronic heart failure with preserved ejection fraction (HFpEF)     COPD (chronic obstructive pulmonary disease)      A-fib     Pulmonary fibrosis     Stage 3b chronic kidney disease     Malignant neoplasm of upper lobe of right lung     Essential hypertension      Treatment Plan  The patient will be admitted to my service here at [facility].   Admit to medical floor with telemetry  Vitals every 4 hours  Cardiac diet  IV fluids saline lock  Activity as tolerated    Acute on chronic respiratory failure multifactorial and recurrent admissions  Recent COVID with Cetacaine release syndrome 2  Chronic diastolic congestive heart failure decompensated  COPD and chronic respiratory failure  Recurrent lung cancer  Oxygen high flow heated nasal cannula transition to nasal cannula as tolerated  Diuresis with Bumex 1 mg every 12 hours  Ins and outs Daily weights  DuoNeb 1 unit dose 4 times daily  Symbicort 160 inhale 2 puffs twice daily  Incentive spirometry, OPEP, ambulate  Expectorant and cough syrup as needed  Prognosis guarded    Home medications reviewed    DVT prophylaxis SCDs    Medical Decision Making  Number and Complexity of problems: 6 complex medical problems  Differential Diagnosis: see above    Conditions and Status        Condition is unchanged.     MDM Data  External documents reviewed: [facility] EHR  Cardiac tracing (EKG, telemetry) interpretation: Ventricular paced rhythm  Radiology interpretation: See above  Labs reviewed: see above  Any tests that were considered but not ordered: none     Decision rules/scores evaluated (example [id number], Wells, etc): N/A     Discussed with: Patient     Care Planning  Shared decision making: Patient  Code status and discussions: DNR    Disposition  Social Determinants of Health that impact treatment or disposition: none  Estimated length of stay is over 2 midnights.     I confirmed that the patient's advanced care plan is present, code status is documented, and a surrogate decision maker is listed in the patient's medical record.     The patient's surrogate decision maker is  clotilde Hsu records.     The patient was seen and examined by me on 9/20/2024 at 0028 AM.    Electronically signed by Wilber Carbajal MD, 09/20/24, 00:28 CDT.

## 2024-09-20 NOTE — PROGRESS NOTES
RT EQUIPMENT DEVICE RELATED - SKIN ASSESSMENT    Saad Score:  Saad Score: 20     RT Medical Equipment/Device:     High Flow Nasal Cannula:   Vapotherm    Skin Assessment:      Nares:  Intact    Device Skin Pressure Protection:  Pressure points protected    Nurse Notification:  No    Irving Rivera, RRT

## 2024-09-21 ENCOUNTER — APPOINTMENT (OUTPATIENT)
Dept: CT IMAGING | Facility: HOSPITAL | Age: 84
DRG: 189 | End: 2024-09-21
Payer: MEDICARE

## 2024-09-21 LAB
ALBUMIN SERPL-MCNC: 2.8 G/DL (ref 3.5–5.2)
ALBUMIN/GLOB SERPL: 1 G/DL
ALP SERPL-CCNC: 80 U/L (ref 39–117)
ALT SERPL W P-5'-P-CCNC: 34 U/L (ref 1–41)
ANION GAP SERPL CALCULATED.3IONS-SCNC: 10 MMOL/L (ref 5–15)
AST SERPL-CCNC: 31 U/L (ref 1–40)
BASOPHILS # BLD AUTO: 0.01 10*3/MM3 (ref 0–0.2)
BASOPHILS NFR BLD AUTO: 0.1 % (ref 0–1.5)
BILIRUB SERPL-MCNC: 0.4 MG/DL (ref 0–1.2)
BUN SERPL-MCNC: 40 MG/DL (ref 8–23)
BUN/CREAT SERPL: 21.5 (ref 7–25)
CALCIUM SPEC-SCNC: 9.5 MG/DL (ref 8.6–10.5)
CHLORIDE SERPL-SCNC: 104 MMOL/L (ref 98–107)
CO2 SERPL-SCNC: 25 MMOL/L (ref 22–29)
CREAT SERPL-MCNC: 1.86 MG/DL (ref 0.76–1.27)
DEPRECATED RDW RBC AUTO: 59.9 FL (ref 37–54)
EGFRCR SERPLBLD CKD-EPI 2021: 35.7 ML/MIN/1.73
EOSINOPHIL # BLD AUTO: 0.12 10*3/MM3 (ref 0–0.4)
EOSINOPHIL NFR BLD AUTO: 1.6 % (ref 0.3–6.2)
ERYTHROCYTE [DISTWIDTH] IN BLOOD BY AUTOMATED COUNT: 17.5 % (ref 12.3–15.4)
GLOBULIN UR ELPH-MCNC: 2.9 GM/DL
GLUCOSE SERPL-MCNC: 111 MG/DL (ref 65–99)
HCT VFR BLD AUTO: 36.9 % (ref 37.5–51)
HGB BLD-MCNC: 11.4 G/DL (ref 13–17.7)
IMM GRANULOCYTES # BLD AUTO: 0.06 10*3/MM3 (ref 0–0.05)
IMM GRANULOCYTES NFR BLD AUTO: 0.8 % (ref 0–0.5)
LYMPHOCYTES # BLD AUTO: 0.55 10*3/MM3 (ref 0.7–3.1)
LYMPHOCYTES NFR BLD AUTO: 7.4 % (ref 19.6–45.3)
MAGNESIUM SERPL-MCNC: 1.8 MG/DL (ref 1.6–2.4)
MCH RBC QN AUTO: 29.2 PG (ref 26.6–33)
MCHC RBC AUTO-ENTMCNC: 30.9 G/DL (ref 31.5–35.7)
MCV RBC AUTO: 94.6 FL (ref 79–97)
MONOCYTES # BLD AUTO: 0.38 10*3/MM3 (ref 0.1–0.9)
MONOCYTES NFR BLD AUTO: 5.1 % (ref 5–12)
MRSA DNA SPEC QL NAA+PROBE: NORMAL
NEUTROPHILS NFR BLD AUTO: 6.27 10*3/MM3 (ref 1.7–7)
NEUTROPHILS NFR BLD AUTO: 85 % (ref 42.7–76)
NRBC BLD AUTO-RTO: 0 /100 WBC (ref 0–0.2)
PLATELET # BLD AUTO: 135 10*3/MM3 (ref 140–450)
PMV BLD AUTO: 12.1 FL (ref 6–12)
POTASSIUM SERPL-SCNC: 3.9 MMOL/L (ref 3.5–5.2)
PROT SERPL-MCNC: 5.7 G/DL (ref 6–8.5)
RBC # BLD AUTO: 3.9 10*6/MM3 (ref 4.14–5.8)
SODIUM SERPL-SCNC: 139 MMOL/L (ref 136–145)
WBC NRBC COR # BLD AUTO: 7.39 10*3/MM3 (ref 3.4–10.8)

## 2024-09-21 PROCEDURE — 25810000003 SODIUM CHLORIDE 0.9 % SOLUTION 250 ML FLEX CONT: Performed by: INTERNAL MEDICINE

## 2024-09-21 PROCEDURE — 94799 UNLISTED PULMONARY SVC/PX: CPT

## 2024-09-21 PROCEDURE — 71250 CT THORAX DX C-: CPT

## 2024-09-21 PROCEDURE — 87641 MR-STAPH DNA AMP PROBE: CPT | Performed by: INTERNAL MEDICINE

## 2024-09-21 PROCEDURE — 99222 1ST HOSP IP/OBS MODERATE 55: CPT | Performed by: INTERNAL MEDICINE

## 2024-09-21 PROCEDURE — 25010000002 CEFEPIME PER 500 MG: Performed by: INTERNAL MEDICINE

## 2024-09-21 PROCEDURE — 25010000002 AZITHROMYCIN PER 500 MG: Performed by: INTERNAL MEDICINE

## 2024-09-21 PROCEDURE — 85025 COMPLETE CBC W/AUTO DIFF WBC: CPT | Performed by: FAMILY MEDICINE

## 2024-09-21 PROCEDURE — 97116 GAIT TRAINING THERAPY: CPT

## 2024-09-21 PROCEDURE — 25010000002 BUMETANIDE PER 0.5 MG: Performed by: FAMILY MEDICINE

## 2024-09-21 PROCEDURE — 83735 ASSAY OF MAGNESIUM: CPT | Performed by: INTERNAL MEDICINE

## 2024-09-21 PROCEDURE — 80053 COMPREHEN METABOLIC PANEL: CPT | Performed by: INTERNAL MEDICINE

## 2024-09-21 PROCEDURE — 87070 CULTURE OTHR SPECIMN AEROBIC: CPT | Performed by: INTERNAL MEDICINE

## 2024-09-21 PROCEDURE — 25010000002 VANCOMYCIN 10 G RECONSTITUTED SOLUTION: Performed by: INTERNAL MEDICINE

## 2024-09-21 PROCEDURE — 87205 SMEAR GRAM STAIN: CPT | Performed by: INTERNAL MEDICINE

## 2024-09-21 PROCEDURE — 25810000003 SODIUM CHLORIDE 0.9 % SOLUTION: Performed by: INTERNAL MEDICINE

## 2024-09-21 PROCEDURE — 94761 N-INVAS EAR/PLS OXIMETRY MLT: CPT

## 2024-09-21 PROCEDURE — 97110 THERAPEUTIC EXERCISES: CPT

## 2024-09-21 RX ORDER — VANCOMYCIN/0.9 % SOD CHLORIDE 1.5G/250ML
20 PLASTIC BAG, INJECTION (ML) INTRAVENOUS ONCE
Status: COMPLETED | OUTPATIENT
Start: 2024-09-21 | End: 2024-09-21

## 2024-09-21 RX ADMIN — Medication 10 MG: at 20:10

## 2024-09-21 RX ADMIN — IPRATROPIUM BROMIDE 2 PUFF: 17 AEROSOL, METERED RESPIRATORY (INHALATION) at 10:23

## 2024-09-21 RX ADMIN — BUMETANIDE 1 MG: 0.25 INJECTION INTRAMUSCULAR; INTRAVENOUS at 17:43

## 2024-09-21 RX ADMIN — ALBUTEROL SULFATE 2 PUFF: 108 INHALANT RESPIRATORY (INHALATION) at 20:15

## 2024-09-21 RX ADMIN — Medication 10 ML: at 09:15

## 2024-09-21 RX ADMIN — ASPIRIN 81 MG: 81 TABLET, COATED ORAL at 09:15

## 2024-09-21 RX ADMIN — PANTOPRAZOLE SODIUM 40 MG: 40 TABLET, DELAYED RELEASE ORAL at 09:15

## 2024-09-21 RX ADMIN — BUDESONIDE AND FORMOTEROL FUMARATE DIHYDRATE 2 PUFF: 160; 4.5 AEROSOL RESPIRATORY (INHALATION) at 06:39

## 2024-09-21 RX ADMIN — IPRATROPIUM BROMIDE 2 PUFF: 17 AEROSOL, METERED RESPIRATORY (INHALATION) at 14:43

## 2024-09-21 RX ADMIN — METOPROLOL SUCCINATE 25 MG: 25 TABLET, EXTENDED RELEASE ORAL at 09:18

## 2024-09-21 RX ADMIN — ISOSORBIDE MONONITRATE 30 MG: 30 TABLET, EXTENDED RELEASE ORAL at 09:18

## 2024-09-21 RX ADMIN — EMPAGLIFLOZIN 10 MG: 10 TABLET, FILM COATED ORAL at 09:15

## 2024-09-21 RX ADMIN — CEFEPIME 2000 MG: 2 INJECTION, POWDER, FOR SOLUTION INTRAVENOUS at 12:16

## 2024-09-21 RX ADMIN — BUDESONIDE AND FORMOTEROL FUMARATE DIHYDRATE 2 PUFF: 160; 4.5 AEROSOL RESPIRATORY (INHALATION) at 20:15

## 2024-09-21 RX ADMIN — SODIUM BICARBONATE 650 MG: 650 TABLET ORAL at 17:43

## 2024-09-21 RX ADMIN — VANCOMYCIN HYDROCHLORIDE 1500 MG: 10 INJECTION, POWDER, LYOPHILIZED, FOR SOLUTION INTRAVENOUS at 13:03

## 2024-09-21 RX ADMIN — IPRATROPIUM BROMIDE 2 PUFF: 17 AEROSOL, METERED RESPIRATORY (INHALATION) at 06:39

## 2024-09-21 RX ADMIN — GUAIFENESIN 600 MG: 600 TABLET, EXTENDED RELEASE ORAL at 09:15

## 2024-09-21 RX ADMIN — ALBUTEROL SULFATE 2 PUFF: 108 INHALANT RESPIRATORY (INHALATION) at 06:39

## 2024-09-21 RX ADMIN — SODIUM BICARBONATE 650 MG: 650 TABLET ORAL at 09:15

## 2024-09-21 RX ADMIN — Medication 10 ML: at 20:11

## 2024-09-21 RX ADMIN — SODIUM BICARBONATE 650 MG: 650 TABLET ORAL at 20:10

## 2024-09-21 RX ADMIN — AZITHROMYCIN DIHYDRATE 500 MG: 500 INJECTION, POWDER, LYOPHILIZED, FOR SOLUTION INTRAVENOUS at 09:12

## 2024-09-21 RX ADMIN — GUAIFENESIN 600 MG: 600 TABLET, EXTENDED RELEASE ORAL at 20:10

## 2024-09-21 RX ADMIN — IPRATROPIUM BROMIDE 2 PUFF: 17 AEROSOL, METERED RESPIRATORY (INHALATION) at 20:14

## 2024-09-21 RX ADMIN — ALBUTEROL SULFATE 2 PUFF: 108 INHALANT RESPIRATORY (INHALATION) at 14:43

## 2024-09-21 RX ADMIN — ATORVASTATIN CALCIUM 20 MG: 10 TABLET, FILM COATED ORAL at 20:10

## 2024-09-21 RX ADMIN — TAMSULOSIN HYDROCHLORIDE 0.4 MG: 0.4 CAPSULE ORAL at 20:10

## 2024-09-21 RX ADMIN — BUMETANIDE 1 MG: 0.25 INJECTION INTRAMUSCULAR; INTRAVENOUS at 05:57

## 2024-09-21 RX ADMIN — ACETAMINOPHEN 1000 MG: 500 TABLET, FILM COATED ORAL at 20:10

## 2024-09-21 RX ADMIN — ALBUTEROL SULFATE 2 PUFF: 108 INHALANT RESPIRATORY (INHALATION) at 10:24

## 2024-09-21 NOTE — CONSULTS
CONSULT NOTE    Patient Identification:  Karoline Prater  84 y.o.  male  1940  [id number]            Requesting physician: Hospitalist    Reason for Consultation: Acute on chronic respiratory failure    CC:     History of Present Illness:  84-year-old gentleman with history of recurrent lung cancer stage IV chronic kidney disease COPD A-fib flutter with pacemaker chronic respiratory failure at baseline uses 2 L oxygen nasal cannula had COVID 9/5/2024.  He now presented with shortness of breath for the last 3 days.  He was discharged from the hospital on 9/8/2024 after treatment for COVID and acute respiratory failure.  Patient presented with increased shortness of breath.  He was noted to be febrile up to 101.3.  Also increased oxygen requirement currently on heated high flow 25 L 50%.  He is currently resting comfortably.  No aspiration as such was reported.      Review of Systems  As above rest is negative  Past Medical History:  Past Medical History:   Diagnosis Date    Arthritis     Atrial fibrillation with rapid ventricular response 05/31/2019    Blindness of left eye     BPH with obstruction/lower urinary tract symptoms     Cancer     stomach & lung    CHF (congestive heart failure)     CKD (chronic kidney disease) stage 3, GFR 30-59 ml/min     COPD with acute exacerbation 08/03/2024    Coronary artery disease     Elevated cholesterol     Essential hypertension 10/02/2017    Fibrotic lung diseases     GERD (gastroesophageal reflux disease)     Hearing loss     History of transfusion     Hydronephrosis of left kidney     Hyperlipidemia     Hypertension     pt was taken off of all of his medications for BP (atenolol, lisinopril, lasix) because his BP kept bottoming out so his primary dr told him to discontinue them 1-2 months ago (Jan/Feb 2019). pt states he takes no medications currently.    Lung cancer     Mass of duodenum versus letty hepatis  04/27/2019    Mass of left renal hilum  04/27/2019     Mass of upper lobe of right lung 02/2019    mass is shrinking on its own, so pt states Dr. Patel is just going to keep an eye on it and not do surgery right now.    Mediastinal adenopathy 10/24/2018    [facility]    Monoclonal gammopathy of unknown significance (MGUS) 09/11/2018    Pancreatic mass     pt states he had this in 2013 but it went away on its own. Now recent CT shows it has come back so he is going to have an ultrasound on 3/13/19.    Shortness of breath        Past Surgical History:  Past Surgical History:   Procedure Laterality Date    ABDOMINAL SURGERY      BRONCHOSCOPY N/A 10/24/2018    Procedure: BRONCHOSCOPY WITH BIOPSY, EBUS;  Surgeon: Gareth Becerra MD;  Location: [facility] OR;  Service: Pulmonary    CHOLECYSTECTOMY      COLONOSCOPY N/A 1/3/2019    Procedure: COLONOSCOPY WITH ANESTHESIA;  Surgeon: Randy Somers DO;  Location: [facility] ENDOSCOPY;  Service: Gastroenterology    CYSTOSCOPY, RETROGRADE PYELOGRAM AND STENT INSERTION Left 3/8/2019    Procedure: CYSTOSCOPY RETROGRADE BILATERAL PYELOGRAM;  Surgeon: Jos Sylvester MD;  Location: [facility] OR;  Service: Urology    ENDOSCOPY N/A 12/11/2018    Procedure: ESOPHAGOGASTRODUODENOSCOPY WITH ANESTHESIA;  Surgeon: Randy Somers DO;  Location: [facility] ENDOSCOPY;  Service: Gastroenterology    ENDOSCOPY N/A 4/29/2019    Procedure: ESOPHAGOGASTRODUODENOSCOPY WITH ANESTHESIA;  Surgeon: Lilliam Jj MD;  Location: [facility] OR;  Service: Gastroenterology    ENDOSCOPY N/A 5/9/2019    Procedure: ESOPHAGOGASTRODUODENOSCOPY WITH ANESTHESIA;  Surgeon: Pilo Bansal MD;  Location: [facility] ENDOSCOPY;  Service: Gastroenterology    EYE SURGERY Left 1964    FOOT SURGERY Right 1966    joint    FRACTURE SURGERY      PACEMAKER IMPLANTATION Left 8/29/2024    Procedure: Leadless pacemaker implant and AVN ablation;  Surgeon: Carlitos Bowen MD;  Location: [facility] CATH INVASIVE LOCATION;  Service: Cardiovascular;  Laterality: Left;        Home Meds:  Medications  Prior to Admission   Medication Sig Dispense Refill Last Dose    acetaminophen (TYLENOL) 500 MG tablet Take 2 tablets by mouth Every Night.       albuterol sulfate  (90 Base) MCG/ACT inhaler Inhale 2 puffs Every 4 (Four) Hours As Needed for Wheezing.       aspirin 81 MG EC tablet Take 1 tablet by mouth Daily. 90 tablet 0     atorvastatin (LIPITOR) 20 MG tablet Take 1 tablet by mouth Every Night. 90 tablet 0     bumetanide (BUMEX) 0.5 MG tablet Take 1 tablet by mouth Daily As Needed (2 LB's overnight or 3 LB's in a week.  New onset of Shortness of breath or increasing bilateral lower extremity edema) for up to 90 days.       dextromethorphan polistirex ER (DELSYM) 30 MG/5ML Suspension Extended Release oral suspension Take 10 mL by mouth Every 12 (Twelve) Hours As Needed (Cough). 280 mL 0     empagliflozin (Jardiance) 10 MG tablet tablet Take 1 tablet by mouth Daily. 30 tablet 11     fluticasone (FLONASE) 50 MCG/ACT nasal spray Administer 1 spray into the nostril(s) as directed by provider 2 (Two) Times a Day.       isosorbide mononitrate (IMDUR) 30 MG 24 hr tablet Take 1 tablet by mouth Daily. Takes before lunch       melatonin 5 MG tablet tablet Take 2 tablets by mouth Every Night.       metoprolol succinate XL (TOPROL-XL) 50 MG 24 hr tablet Take 1 tablet by mouth Daily.       multivitamin with minerals tablet tablet Take 1 tablet by mouth Daily.       pantoprazole (PROTONIX) 40 MG EC tablet Take 1 tablet by mouth Daily. 30 tablet 1     sodium bicarbonate 650 MG tablet Take 1 tablet by mouth 3 (Three) Times a Day.       tamsulosin (FLOMAX) 0.4 MG capsule 24 hr capsule Take 1 capsule by mouth Every Night.       tiotropium bromide-olodaterol (Stiolto Respimat) 2.5-2.5 MCG/ACT aerosol solution inhaler Inhale 2 puffs Daily.          Allergies:  Allergies   Allergen Reactions    Penicillins Hives       Social History:   Social History     Socioeconomic History    Marital status: Single   Tobacco Use    Smoking  "status: Former     Current packs/day: 0.00     Types: Cigarettes     Start date: 10/29/1954     Quit date: 10/29/2003     Years since quittin.9    Smokeless tobacco: Former     Types: Chew     Quit date:    Vaping Use    Vaping status: Never Used   Substance and Sexual Activity    Alcohol use: No    Drug use: No    Sexual activity: Defer       Family History:  Family History   Problem Relation Age of Onset    Hypertension Mother     Leukemia Father        Physical Exam:  /62   Pulse 91   Temp 98.1 °F (36.7 °C) (Oral)   Resp 18   Ht 162.6 cm (64\")   Wt 73.8 kg (162 lb 9.6 oz)   SpO2 94%   BMI 27.91 kg/m²  Body mass index is 27.91 kg/m². 94% 73.8 kg (162 lb 9.6 oz)  Physical Exam  Patient is examined using the personal protective equipment as per guidelines from infection control for this particular patient as enacted.  Hand hygiene was performed before and after patient interaction.  Elderly gentleman somewhat confused  Eyes normal conjunctivae and pupils reactive to light  ENT Mallampati between 3 and 4 normal nasal exam  Neck midline trachea no thyromegaly  Chest diminished breath sounds with crackles bilaterally  CVS regular rate and rhythm no lower extremity edema  Abdomen soft nontender no hepatosplenomegaly  CNS intact no focal deficit  Skin no rashes no nodules  Psych confused  Musculoskeletal no cyanosis no clubbing normal range of motion        LABS:  Lab Results   Component Value Date    CALCIUM 9.5 2024    PHOS 3.0 2023     Results from last 7 days   Lab Units 24  0538 24  0433 24  1051   MAGNESIUM mg/dL 1.8  --   --  1.8  --    SODIUM mmol/L 139 138  --  139 139   POTASSIUM mmol/L 3.9 3.9  --  4.4 4.4   CHLORIDE mmol/L 104 105  --  103 103   TOTAL CO2 mmol/L  --   --   --   --  25   CO2 mmol/L 25.0 24.0  --  26.0  --    BUN mg/dL 40* 39*  --  37* 38*   CREATININE mg/dL 1.86* 1.83*  --  1.79* 1.3*   GLUCOSE mg/dL 111* 93   < " > 113* 117*   CALCIUM mg/dL 9.5 8.9  --  9.8 10.3*   WBC 10*3/mm3 7.39 8.09  --  10.54 13.59*   HEMOGLOBIN g/dL 11.4* 10.5*  --  12.7* 12.4*   PLATELETS 10*3/mm3 135* 145  --  170 175   ALT (SGPT) U/L 34  --   --  46* 41   AST (SGOT) U/L 31  --   --  41* 32   PROCALCITONIN ng/mL  --  0.33*  --   --   --     < > = values in this interval not displayed.     Lab Results   Component Value Date    CKTOTAL 148 01/05/2023    TROPONINI 0.027 08/10/2019    TROPONINT 117 (C) 08/26/2024         Results from last 7 days   Lab Units 09/19/24 2025 09/19/24 1928   BLOODCX  No growth at 24 hours No growth at 24 hours     Results from last 7 days   Lab Units 09/20/24  0433 09/19/24 1928   PROCALCITONIN ng/mL 0.33*  --    LACTATE mmol/L  --  1.6         Results from last 7 days   Lab Units 09/19/24 1944   ADENOVIRUS DETECTION BY PCR  Not Detected   CORONAVIRUS 229E  Not Detected   CORONAVIRUS HKU1  Not Detected   CORONAVIRUS NL63  Not Detected   CORONAVIRUS OC43  Not Detected   HUMAN METAPNEUMOVIRUS  Not Detected   HUMAN RHINOVIRUS/ENTEROVIRUS  Not Detected   INFLUENZA B PCR  Not Detected   PARAINFLUENZA 1  Not Detected   PARAINFLUENZA VIRUS 2  Not Detected   PARAINFLUENZA VIRUS 3  Not Detected   PARAINFLUENZA VIRUS 4  Not Detected   BORDETELLA PERTUSSIS PCR  Not Detected   BORDETELLA PARAPERTUSSIS PCR  Not Detected   CHLAMYDOPHILA PNEUMONIAE PCR  Not Detected   MYCOPLAMA PNEUMO PCR  Not Detected   RSV, PCR  Not Detected         Results from last 7 days   Lab Units 09/19/24 2025 09/19/24 1928   BLOODCX  No growth at 24 hours No growth at 24 hours     Lab Results   Component Value Date    TSH 2.350 06/29/2024     Estimated Creatinine Clearance: 28.2 mL/min (A) (by C-G formula based on SCr of 1.86 mg/dL (H)).  Results from last 7 days   Lab Units 09/19/24 1943   NITRITE UA  Negative   WBC UA /HPF Too Numerous to Count*   BACTERIA UA /HPF 2+*   SQUAM EPITHEL UA /HPF None Seen        Imaging: I personally visualized the images of  scans/x-rays performed within last 3 days.      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  Difficult to say if it is infection versus post-COVID syndrome versus lymphangitic spread of the lung cancer.  Clinically it is difficult to say if patient has had superimposed infection.  Since procalcitonin elevated and increased oxygen requirement I would go ahead and treat him for healthcare associated pneumonia with broad-spectrum antibiotics.  De-escalate based on culture.  I will initiate Zosyn vancomycin and discontinue Rocephin.  Check MRSA swab  Check CT chest to evaluate further  Bronchodilators will be continued  Wean down oxygen maintain sats above 90%            Zi Sigala MD  9/21/2024  11:38 CDT      Much of this encounter note is an electronic transcription/translation of spoken language to printed text using Dragon Software.

## 2024-09-21 NOTE — PROGRESS NOTES
"Pharmacy Dosing Service  Pharmacokinetics  Vancomycin Initial Evaluation  Assessment/Action/Plan:  Loading dose: 20mg/kg 1500mg   Followed by: Vancomycin 750 mg IVPB every 24 hours  Current end date:9/27/24  Additional antimicrobial agent(s): Cefepime    Vancomycin dosage initiated based on population pharmacokinetic parameters. Predicted steady state concentrations below. Pharmacy will continue to follow daily and adjust dose accordingly.    AUC Model Data:  Regimen: 750 mg IV every 24 hours.  Exposure target: AUC24 (range)400-600 mg/L.hr   AUC24,ss: 445 mg/L.hr  Probability of AUC24 > 400: 63 %  Ctrough,ss: 14.9 mg/L  Probability of Ctrough,ss > 20: 19 %  Probability of nephrotoxicity (Lodise TEX 2009): 10 %     Subjective:  Karoline Prater is a 84 y.o. male with a Vancomycin \"Pharmacy to Dose\" consult for the treatment of Pneumonia , day 1 of 7 of treatment.      Objective:  Ht: 162.6 cm (64\"); Wt: 73.8 kg (162 lb 9.6 oz)  Estimated Creatinine Clearance: 28.2 mL/min (A) (by C-G formula based on SCr of 1.86 mg/dL (H)).   Creatinine   Date Value Ref Range Status   09/21/2024 1.86 (H) 0.76 - 1.27 mg/dL Final   09/20/2024 1.83 (H) 0.76 - 1.27 mg/dL Final   09/19/2024 1.79 (H) 0.76 - 1.27 mg/dL Final   09/18/2024 1.3 (H) 0.7 - 1.2 mg/dL Final     Lab Results   Component Value Date    WBC 7.39 09/21/2024    WBC 8.09 09/20/2024    WBC 10.54 09/19/2024      Baseline culture results:  Microbiology Results (last 10 days)       Procedure Component Value - Date/Time    Respiratory Culture - Sputum, Cough [[id number]] Collected: 09/21/24 0047    Lab Status: Preliminary result Specimen: Sputum from Cough Updated: 09/21/24 0640     Gram Stain Moderate (3+) WBCs per low power field      Rare (1+) Epithelial cells per low power field      Few (2+) Mixed gram positive nya    Blood Culture - Blood, Arm, Left [[id number]]  (Normal) Collected: 09/19/24 2025    Lab Status: Preliminary result Specimen: Blood from Arm, Left " Updated: 09/20/24 2100     Blood Culture No growth at 24 hours    Respiratory Panel PCR w/COVID-19(SARS-CoV-2) JORDYN/MATILDE/BRYAN/PAD/COR/ASHER In-House, NP Swab in UTM/VTM, 2 HR TAT - Swab, Nasopharynx [[id number]]  (Abnormal) Collected: 09/19/24 1944    Lab Status: Final result Specimen: Swab from Nasopharynx Updated: 09/19/24 2113     ADENOVIRUS, PCR Not Detected     Coronavirus 229E Not Detected     Coronavirus HKU1 Not Detected     Coronavirus NL63 Not Detected     Coronavirus OC43 Not Detected     COVID19 Detected     Human Metapneumovirus Not Detected     Human Rhinovirus/Enterovirus Not Detected     Influenza A PCR Not Detected     Influenza B PCR Not Detected     Parainfluenza Virus 1 Not Detected     Parainfluenza Virus 2 Not Detected     Parainfluenza Virus 3 Not Detected     Parainfluenza Virus 4 Not Detected     RSV, PCR Not Detected     Bordetella pertussis pcr Not Detected     Bordetella parapertussis PCR Not Detected     Chlamydophila pneumoniae PCR Not Detected     Mycoplasma pneumo by PCR Not Detected    Narrative:      In the setting of a positive respiratory panel with a viral infection PLUS a negative procalcitonin without other underlying concern for bacterial infection, consider observing off antibiotics or discontinuation of antibiotics and continue supportive care. If the respiratory panel is positive for atypical bacterial infection (Bordetella pertussis, Chlamydophila pneumoniae, or Mycoplasma pneumoniae), consider antibiotic de-escalation to target atypical bacterial infection.    Blood Culture - Blood, Arm, Left [[id number]]  (Normal) Collected: 09/19/24 1928    Lab Status: Preliminary result Specimen: Blood from Arm, Left Updated: 09/20/24 2000     Blood Culture No growth at 24 hours            NATALIE Martinez PharmD  09/21/24 11:51 CDT

## 2024-09-21 NOTE — PROGRESS NOTES
[facility] Medicine Services  INPATIENT PROGRESS NOTE    Patient Name: Karoline Prater  Date of Admission: 9/19/2024  Today's Date: 09/21/24  Length of Stay: 1  Primary Care Physician: Irving Alexis MD    Subjective   Chief Complaint: acute on chronic respiratory failure with hypoxia    HPI   Patient seen resting in bed.  Respiratory therapy at bedside.  He is currently on heated high flow 25 L 50%.  O2.  Sats in the low to mid 90s.  Tmax 100.2 last evening.  Blood pressure stable.  Tolerating p.o.  No nausea or vomiting.        Review of Systems   All pertinent negatives and positives are as above. All other systems have been reviewed and are negative unless otherwise stated.     Objective    Temp:  [97.6 °F (36.4 °C)-100.2 °F (37.9 °C)] 97.9 °F (36.6 °C)  Heart Rate:  [69-92] 89  Resp:  [18-20] 18  BP: (108-142)/(65-80) 131/76  Physical Exam  GEN: Awake, alert, interactive, in NAD, appears frail/chronically ill  HEENT: L eye blind, R eye normal motion/pupillary reflex. Anicteric  Lungs: diminished bs, equal chest rist, normal respiratory effort  Heart: +S1/s2, no rub  ABD: soft, nt/nd, +BS, no guarding/rebound  Extremities: atraumatic, no cyanosis, trace b/l LE edema  Skin: no rashes or petechiae  Neuro: AAOx3, no obvious focal deficits  Psych: normal mood & affect        Results Review:  I have reviewed the labs, radiology results, and diagnostic studies.    Laboratory Data:   Results from last 7 days   Lab Units 09/21/24 0538 09/20/24 0433 09/19/24 1928   WBC 10*3/mm3 7.39 8.09 10.54   HEMOGLOBIN g/dL 11.4* 10.5* 12.7*   HEMATOCRIT % 36.9* 34.1* 41.5   PLATELETS 10*3/mm3 135* 145 170        Results from last 7 days   Lab Units 09/21/24  0538 09/20/24  0433 09/19/24 1928 09/18/24  1051   SODIUM mmol/L 139 138 139 139   POTASSIUM mmol/L 3.9 3.9 4.4 4.4   CHLORIDE mmol/L 104 105 103 103   TOTAL CO2 mmol/L  --   --   --  25   CO2 mmol/L 25.0 24.0 26.0  --    BUN  mg/dL 40* 39* 37* 38*   CREATININE mg/dL 1.86* 1.83* 1.79* 1.3*   CALCIUM mg/dL 9.5 8.9 9.8 10.3*   BILIRUBIN mg/dL 0.4  --  0.6 0.5   ALK PHOS U/L 80  --  100 102   ALT (SGPT) U/L 34  --  46* 41   AST (SGOT) U/L 31  --  41* 32   GLUCOSE mg/dL 111* 93 113* 117*       Culture Data:   Blood Culture   Date Value Ref Range Status   09/19/2024 No growth at 24 hours  Preliminary   09/19/2024 No growth at 24 hours  Preliminary       Radiology Data:   Imaging Results (Last 24 Hours)       ** No results found for the last 24 hours. **            I have reviewed the patient's current medications.     Assessment/Plan   Assessment  Active Hospital Problems    Diagnosis     **Acute on chronic respiratory failure with hypoxia     Acute-on-chronic respiratory failure     Pneumonitis     COVID-19 virus infection     Chronic heart failure with preserved ejection fraction (HFpEF)     COPD (chronic obstructive pulmonary disease)     A-fib     Pulmonary fibrosis     Stage 3b chronic kidney disease     Malignant neoplasm of upper lobe of right lung     Essential hypertension        Treatment Plan  #1 acute on chronic respiratory failure with hypoxia -patient with worsening baseline hypoxia.  Underlying ILD.  Recent COVID.  Concern for possible secondary bacterial pneumonia.  Elevated Pro-Skinny.  Patient started on ceftriaxone and azithromycin.  Discussed with pulmonary.  Will be transition to broader spectrum antibiotic today.  Also some concern for fluid given his cardiac status and he was started on Bumex with good output.  Renal function stable.  Monitored ongoing diuresis with Bumex.    #2 acute on chronic preserved EF CHF -hard to say fully.  Patient is diuresing well.  Likely some slight fluid overload.  Continue to monitor diuretic response.  Continue Toprol-XL, Jardiance.  Not on additional goal-directed regimen due to renal function.    #3 CKD 3B -seems to be around baseline.  Monitor closely with diuretics and meds.    #4  COVID-19 -initially positive early September.  Still positive.  Suspect this is less likely acutely driving current illness    #5 essential hypertension -BP stable on Toprol and Imdur    #6 COPD/pulm fibrosis -supportive care    Medical Decision Making  Number and Complexity of problems: 1 acute, 2 acute on chronic, multiple chronic  Differential Diagnosis: as above    Conditions and Status        Clinically about the same as when I saw him yesterday, not at goal     MDM Data  External documents reviewed: none  Cardiac tracing (EKG, telemetry) interpretation: paced  Radiology interpretation: reports reviewed  Labs reviewed: as above  Any tests that were considered but not ordered: none     Decision rules/scores evaluated (example [id number], Wells, etc): none     Discussed with: patient, nursing, respiratory therapy, Dr. Sigala     Care Planning  Shared decision making: Patient apprised of current labs, vitals, imaging and treatment plan.  They are agreeable with proceeding with plans as discussed.    Code status and discussions: full code    Disposition  Social Determinants of Health that impact treatment or disposition: none  I expect the patient to be discharge when appropriate, several days yet, f/u therapy notes    Electronically signed by Irving Power DO, 09/21/24, 08:30 CDT.

## 2024-09-21 NOTE — PLAN OF CARE
Goal Outcome Evaluation:           Progress: improving  Outcome Evaluation: VSS.   76-99, per tele.  Afebrile.  No c/o pain.  Vapotherm 25L/50%.  Voiding well.  Respiratory specimen collected and taken to lab.  Pt slept well throughout shift.

## 2024-09-21 NOTE — THERAPY TREATMENT NOTE
Acute Care - Physical Therapy Treatment Note  [facility]     Patient Name: Karoline Prater  : 1940  MRN: [id number]  Today's Date: 2024      Visit Dx:     ICD-10-CM ICD-9-CM   1. Hypoxia  R09.02 799.02   2. Pneumonitis  J98.4 486   3. Acute UTI  N39.0 599.0   4. Impaired mobility [Z74.09]  Z74.09 799.89     Patient Active Problem List   Diagnosis    Dyspnea    Essential hypertension    NSVT (nonsustained ventricular tachycardia)    Malignant neoplasm of upper lobe of right lung    Stage 3b chronic kidney disease    Pancytopenia due to antineoplastic chemotherapy    Pneumonia due to infectious organism    Function kidney decreased    Cellulitis    Acute on chronic respiratory failure with hypoxia    Pulmonary fibrosis    Macrocytic anemia    Thrombocytopenia    Sepsis    Right pneumothorax    Hyperkalemia    Acute kidney injury superimposed on CKD stage 3b    GERD without esophagitis    A-fib    Former smoker    Chest pain    Tachycardia    Bilateral lower extremity edema    Metastatic adenocarcinoma of the RUL    Chronic diastolic congestive heart failure    COPD (chronic obstructive pulmonary disease)    S/P radiation > 12 weeks    CHF (congestive heart failure)    History of radiation therapy    Chronic heart failure with preserved ejection fraction (HFpEF)    Narrow complex tachycardia    Atrial flutter    Encounter for examination for normal comparison and control in clinical research program    CHF exacerbation    COVID-19 virus infection    Cytokine release syndrome, grade 2    Pneumonia, unspecified organism    Hyperlipidemia    Coronary artery disease    Pneumonitis    Acute-on-chronic respiratory failure     Past Medical History:   Diagnosis Date    Arthritis     Atrial fibrillation with rapid ventricular response 2019    Blindness of left eye     BPH with obstruction/lower urinary tract symptoms     Cancer     stomach & lung    CHF (congestive heart failure)     CKD (chronic kidney  disease) stage 3, GFR 30-59 ml/min     COPD with acute exacerbation 08/03/2024    Coronary artery disease     Elevated cholesterol     Essential hypertension 10/02/2017    Fibrotic lung diseases     GERD (gastroesophageal reflux disease)     Hearing loss     History of transfusion     Hydronephrosis of left kidney     Hyperlipidemia     Hypertension     pt was taken off of all of his medications for BP (atenolol, lisinopril, lasix) because his BP kept bottoming out so his primary dr told him to discontinue them 1-2 months ago (Jan/Feb 2019). pt states he takes no medications currently.    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 Patel is just going to keep an eye on it and not do surgery right now.    Mediastinal adenopathy 10/24/2018    Station 7    Monoclonal gammopathy of unknown significance (MGUS) 09/11/2018    Pancreatic mass     pt states he had this in 2013 but it went away on its own. Now recent CT shows it has come back so he is going to have an ultrasound on 3/13/19.    Shortness of breath      Past Surgical History:   Procedure Laterality Date    ABDOMINAL SURGERY      BRONCHOSCOPY N/A 10/24/2018    Procedure: BRONCHOSCOPY WITH BIOPSY, EBUS;  Surgeon: Gareth Becerra MD;  Location: [facility] OR;  Service: Pulmonary    CHOLECYSTECTOMY      COLONOSCOPY N/A 1/3/2019    Procedure: COLONOSCOPY WITH ANESTHESIA;  Surgeon: Randy Somers DO;  Location: [facility] ENDOSCOPY;  Service: Gastroenterology    CYSTOSCOPY, RETROGRADE PYELOGRAM AND STENT INSERTION Left 3/8/2019    Procedure: CYSTOSCOPY RETROGRADE BILATERAL PYELOGRAM;  Surgeon: Jos Sylvester MD;  Location: [facility] OR;  Service: Urology    ENDOSCOPY N/A 12/11/2018    Procedure: ESOPHAGOGASTRODUODENOSCOPY WITH ANESTHESIA;  Surgeon: Randy Somers DO;  Location: [facility] ENDOSCOPY;  Service:  Gastroenterology    ENDOSCOPY N/A 4/29/2019    Procedure: ESOPHAGOGASTRODUODENOSCOPY WITH ANESTHESIA;  Surgeon: Lilliam Jj MD;  Location: [facility] OR;  Service: Gastroenterology    ENDOSCOPY N/A 5/9/2019    Procedure: ESOPHAGOGASTRODUODENOSCOPY WITH ANESTHESIA;  Surgeon: Pilo Bansal MD;  Location: [facility] ENDOSCOPY;  Service: Gastroenterology    EYE SURGERY Left 1964    FOOT SURGERY Right 1966    joint    FRACTURE SURGERY      PACEMAKER IMPLANTATION Left 8/29/2024    Procedure: Leadless pacemaker implant and AVN ablation;  Surgeon: Carlitos Bowen MD;  Location: [facility] CATH INVASIVE LOCATION;  Service: Cardiovascular;  Laterality: Left;     PT Assessment (Last 12 Hours)       PT Evaluation and Treatment       Row Name 09/21/24 1421          Physical Therapy Time and Intention    Subjective Information complains of;dyspnea  -AB     Document Type therapy note (daily note)  -AB     Mode of Treatment physical therapy  -AB       Row Name 09/21/24 1421          General Information    Existing Precautions/Restrictions fall;oxygen therapy device and L/min  Vapo therm  -AB       Row Name 09/21/24 1421          Bed Mobility    Supine-Sit [place] (Bed Mobility) standby assist  -AB     Sit-Supine [place] (Bed Mobility) standby assist  -AB       Row Name 09/21/24 1421          Sit-Stand Transfer    Sit-Stand [place] (Transfers) standby assist;contact guard  -AB       Row Name 09/21/24 1421          Gait/Stairs (Locomotion)    [place] Level (Gait) standby assist;contact guard  -AB     Distance in Feet (Gait) 60  sitting rest then 50'  -AB     Deviations/Abnormal Patterns (Gait) gait speed decreased  -AB       Row Name 09/21/24 1421          Positioning and Restraints    Pre-Treatment Position in bed  -AB     Post Treatment Position bed  -AB     In Bed fowlers;call light within reach  -AB               User Key  (r) = Recorded By, (t) = Taken By, (c) = Cosigned By      Initials Name Provider Type    AB  Daria Edwards PTA Physical Therapist Assistant                    Physical Therapy Education       Title: PT OT SLP Therapies (In Progress)       Topic: Physical Therapy (In Progress)       Point: Mobility training (Done)       Learning Progress Summary             Patient Acceptance, E, MALIK,BECK,NR by LEIDY at 9/20/2024 1400    Comment: benefits of activity, progression of PT POC                         Point: Home exercise program (Not Started)       Learner Progress:  Not documented in this visit.              Point: Body mechanics (Not Started)       Learner Progress:  Not documented in this visit.              Point: Precautions (Not Started)       Learner Progress:  Not documented in this visit.                              User Key       Initials Effective Dates Name Provider Type Discipline    LEIDY 02/03/23 -  Cristian Hirsch PT DPT Physical Therapist PT                  PT Recommendation and Plan             Time Calculation:    PT Charges       Row Name 09/21/24 1421             Time Calculation    Start Time 1421  -AB      Stop Time 1446  -AB      Time Calculation (min) 25 min  -AB      PT Received On 09/21/24  -AB         Time Calculation- PT    Total Timed Code Minutes- PT 25 minute(s)  -AB                User Key  (r) = Recorded By, (t) = Taken By, (c) = Cosigned By      Initials Name Provider Type    AB Daria Edwards, SHIMA Physical Therapist Assistant                      PT G-Codes  Outcome Measure Options: AM-PAC 6 Clicks Basic Mobility (PT)  AM-PAC 6 Clicks Score (PT): 17    Daria Edwards PTA  9/21/2024

## 2024-09-22 LAB
ANION GAP SERPL CALCULATED.3IONS-SCNC: 10 MMOL/L (ref 5–15)
BACTERIA SPEC AEROBE CULT: ABNORMAL
BASOPHILS # BLD AUTO: 0.01 10*3/MM3 (ref 0–0.2)
BASOPHILS NFR BLD AUTO: 0.1 % (ref 0–1.5)
BUN SERPL-MCNC: 38 MG/DL (ref 8–23)
BUN/CREAT SERPL: 19.9 (ref 7–25)
CALCIUM SPEC-SCNC: 9.5 MG/DL (ref 8.6–10.5)
CHLORIDE SERPL-SCNC: 103 MMOL/L (ref 98–107)
CO2 SERPL-SCNC: 24 MMOL/L (ref 22–29)
CREAT SERPL-MCNC: 1.91 MG/DL (ref 0.76–1.27)
DEPRECATED RDW RBC AUTO: 59.8 FL (ref 37–54)
EGFRCR SERPLBLD CKD-EPI 2021: 34.6 ML/MIN/1.73
EOSINOPHIL # BLD AUTO: 0.19 10*3/MM3 (ref 0–0.4)
EOSINOPHIL NFR BLD AUTO: 2.6 % (ref 0.3–6.2)
ERYTHROCYTE [DISTWIDTH] IN BLOOD BY AUTOMATED COUNT: 17.2 % (ref 12.3–15.4)
GLUCOSE SERPL-MCNC: 111 MG/DL (ref 65–99)
HCT VFR BLD AUTO: 37.4 % (ref 37.5–51)
HGB BLD-MCNC: 11.7 G/DL (ref 13–17.7)
IMM GRANULOCYTES # BLD AUTO: 0.06 10*3/MM3 (ref 0–0.05)
IMM GRANULOCYTES NFR BLD AUTO: 0.8 % (ref 0–0.5)
LYMPHOCYTES # BLD AUTO: 0.44 10*3/MM3 (ref 0.7–3.1)
LYMPHOCYTES NFR BLD AUTO: 6.1 % (ref 19.6–45.3)
MCH RBC QN AUTO: 29.4 PG (ref 26.6–33)
MCHC RBC AUTO-ENTMCNC: 31.3 G/DL (ref 31.5–35.7)
MCV RBC AUTO: 94 FL (ref 79–97)
MONOCYTES # BLD AUTO: 0.33 10*3/MM3 (ref 0.1–0.9)
MONOCYTES NFR BLD AUTO: 4.6 % (ref 5–12)
NEUTROPHILS NFR BLD AUTO: 6.19 10*3/MM3 (ref 1.7–7)
NEUTROPHILS NFR BLD AUTO: 85.8 % (ref 42.7–76)
NRBC BLD AUTO-RTO: 0 /100 WBC (ref 0–0.2)
PLATELET # BLD AUTO: 126 10*3/MM3 (ref 140–450)
PMV BLD AUTO: 11.9 FL (ref 6–12)
POTASSIUM SERPL-SCNC: 4.1 MMOL/L (ref 3.5–5.2)
RBC # BLD AUTO: 3.98 10*6/MM3 (ref 4.14–5.8)
SODIUM SERPL-SCNC: 137 MMOL/L (ref 136–145)
WBC NRBC COR # BLD AUTO: 7.22 10*3/MM3 (ref 3.4–10.8)

## 2024-09-22 PROCEDURE — 25810000003 SODIUM CHLORIDE 0.9 % SOLUTION: Performed by: INTERNAL MEDICINE

## 2024-09-22 PROCEDURE — 97110 THERAPEUTIC EXERCISES: CPT

## 2024-09-22 PROCEDURE — 99232 SBSQ HOSP IP/OBS MODERATE 35: CPT | Performed by: INTERNAL MEDICINE

## 2024-09-22 PROCEDURE — 25010000002 VANCOMYCIN 10 G RECONSTITUTED SOLUTION: Performed by: INTERNAL MEDICINE

## 2024-09-22 PROCEDURE — 94799 UNLISTED PULMONARY SVC/PX: CPT

## 2024-09-22 PROCEDURE — 63710000001 DIPHENHYDRAMINE PER 50 MG: Performed by: FAMILY MEDICINE

## 2024-09-22 PROCEDURE — 94761 N-INVAS EAR/PLS OXIMETRY MLT: CPT

## 2024-09-22 PROCEDURE — 97116 GAIT TRAINING THERAPY: CPT

## 2024-09-22 PROCEDURE — 85025 COMPLETE CBC W/AUTO DIFF WBC: CPT | Performed by: FAMILY MEDICINE

## 2024-09-22 PROCEDURE — 80048 BASIC METABOLIC PNL TOTAL CA: CPT | Performed by: INTERNAL MEDICINE

## 2024-09-22 PROCEDURE — 25010000002 BUMETANIDE PER 0.5 MG: Performed by: FAMILY MEDICINE

## 2024-09-22 PROCEDURE — 94664 DEMO&/EVAL PT USE INHALER: CPT

## 2024-09-22 PROCEDURE — 25010000002 CEFEPIME PER 500 MG: Performed by: INTERNAL MEDICINE

## 2024-09-22 RX ORDER — DIPHENHYDRAMINE HCL 25 MG
25 CAPSULE ORAL NIGHTLY PRN
Status: DISCONTINUED | OUTPATIENT
Start: 2024-09-22 | End: 2024-10-07 | Stop reason: HOSPADM

## 2024-09-22 RX ADMIN — BUDESONIDE AND FORMOTEROL FUMARATE DIHYDRATE 2 PUFF: 160; 4.5 AEROSOL RESPIRATORY (INHALATION) at 19:51

## 2024-09-22 RX ADMIN — TAMSULOSIN HYDROCHLORIDE 0.4 MG: 0.4 CAPSULE ORAL at 21:38

## 2024-09-22 RX ADMIN — ASPIRIN 81 MG: 81 TABLET, COATED ORAL at 09:21

## 2024-09-22 RX ADMIN — SODIUM BICARBONATE 650 MG: 650 TABLET ORAL at 17:02

## 2024-09-22 RX ADMIN — PANTOPRAZOLE SODIUM 40 MG: 40 TABLET, DELAYED RELEASE ORAL at 09:21

## 2024-09-22 RX ADMIN — ALBUTEROL SULFATE 2 PUFF: 108 INHALANT RESPIRATORY (INHALATION) at 10:13

## 2024-09-22 RX ADMIN — ISOSORBIDE MONONITRATE 30 MG: 30 TABLET, EXTENDED RELEASE ORAL at 09:21

## 2024-09-22 RX ADMIN — SODIUM BICARBONATE 650 MG: 650 TABLET ORAL at 09:21

## 2024-09-22 RX ADMIN — DOCUSATE SODIUM 50 MG AND SENNOSIDES 8.6 MG 2 TABLET: 8.6; 5 TABLET, FILM COATED ORAL at 12:40

## 2024-09-22 RX ADMIN — ALBUTEROL SULFATE 2 PUFF: 108 INHALANT RESPIRATORY (INHALATION) at 06:16

## 2024-09-22 RX ADMIN — Medication 10 ML: at 21:39

## 2024-09-22 RX ADMIN — IPRATROPIUM BROMIDE 2 PUFF: 17 AEROSOL, METERED RESPIRATORY (INHALATION) at 14:13

## 2024-09-22 RX ADMIN — DIPHENHYDRAMINE HYDROCHLORIDE 25 MG: 25 CAPSULE ORAL at 21:49

## 2024-09-22 RX ADMIN — EMPAGLIFLOZIN 10 MG: 10 TABLET, FILM COATED ORAL at 09:21

## 2024-09-22 RX ADMIN — CEFEPIME 2000 MG: 2 INJECTION, POWDER, FOR SOLUTION INTRAVENOUS at 12:40

## 2024-09-22 RX ADMIN — Medication 10 ML: at 09:21

## 2024-09-22 RX ADMIN — SODIUM CHLORIDE 750 MG: 9 INJECTION, SOLUTION INTRAVENOUS at 13:15

## 2024-09-22 RX ADMIN — ALBUTEROL SULFATE 2 PUFF: 108 INHALANT RESPIRATORY (INHALATION) at 19:51

## 2024-09-22 RX ADMIN — SODIUM BICARBONATE 650 MG: 650 TABLET ORAL at 21:39

## 2024-09-22 RX ADMIN — METOPROLOL SUCCINATE 25 MG: 25 TABLET, EXTENDED RELEASE ORAL at 09:21

## 2024-09-22 RX ADMIN — GUAIFENESIN 600 MG: 600 TABLET, EXTENDED RELEASE ORAL at 21:39

## 2024-09-22 RX ADMIN — DIPHENHYDRAMINE HYDROCHLORIDE 25 MG: 25 CAPSULE ORAL at 00:25

## 2024-09-22 RX ADMIN — GUAIFENESIN 600 MG: 600 TABLET, EXTENDED RELEASE ORAL at 09:21

## 2024-09-22 RX ADMIN — ACETAMINOPHEN 1000 MG: 500 TABLET, FILM COATED ORAL at 21:38

## 2024-09-22 RX ADMIN — IPRATROPIUM BROMIDE 2 PUFF: 17 AEROSOL, METERED RESPIRATORY (INHALATION) at 10:12

## 2024-09-22 RX ADMIN — BUDESONIDE AND FORMOTEROL FUMARATE DIHYDRATE 2 PUFF: 160; 4.5 AEROSOL RESPIRATORY (INHALATION) at 06:16

## 2024-09-22 RX ADMIN — IPRATROPIUM BROMIDE 2 PUFF: 17 AEROSOL, METERED RESPIRATORY (INHALATION) at 06:16

## 2024-09-22 RX ADMIN — BUMETANIDE 1 MG: 0.25 INJECTION INTRAMUSCULAR; INTRAVENOUS at 05:45

## 2024-09-22 RX ADMIN — ATORVASTATIN CALCIUM 20 MG: 10 TABLET, FILM COATED ORAL at 21:39

## 2024-09-22 RX ADMIN — IPRATROPIUM BROMIDE 2 PUFF: 17 AEROSOL, METERED RESPIRATORY (INHALATION) at 19:51

## 2024-09-22 RX ADMIN — BUMETANIDE 1 MG: 0.25 INJECTION INTRAMUSCULAR; INTRAVENOUS at 17:02

## 2024-09-22 RX ADMIN — ALBUTEROL SULFATE 2 PUFF: 108 INHALANT RESPIRATORY (INHALATION) at 14:13

## 2024-09-22 NOTE — PLAN OF CARE
Goal Outcome Evaluation:  Plan of Care Reviewed With: patient           Outcome Evaluation: Low grade temp at suppertime, HR  , now on vapotherm 25L/60%, up in chair for couple of hours today, voiding well appetite fair, SOB with exertion, safety maintained

## 2024-09-22 NOTE — THERAPY TREATMENT NOTE
Acute Care - Physical Therapy Treatment Note  [facility]     Patient Name: Karoline Prater  : 1940  MRN: [id number]  Today's Date: 2024      Visit Dx:     ICD-10-CM ICD-9-CM   1. Hypoxia  R09.02 799.02   2. Pneumonitis  J98.4 486   3. Acute UTI  N39.0 599.0   4. Impaired mobility [Z74.09]  Z74.09 799.89     Patient Active Problem List   Diagnosis    Dyspnea    Essential hypertension    NSVT (nonsustained ventricular tachycardia)    Malignant neoplasm of upper lobe of right lung    Stage 3b chronic kidney disease    Pancytopenia due to antineoplastic chemotherapy    Pneumonia due to infectious organism    Function kidney decreased    Cellulitis    Acute on chronic respiratory failure with hypoxia    Pulmonary fibrosis    Macrocytic anemia    Thrombocytopenia    Sepsis    Right pneumothorax    Hyperkalemia    Acute kidney injury superimposed on CKD stage 3b    GERD without esophagitis    A-fib    Former smoker    Chest pain    Tachycardia    Bilateral lower extremity edema    Metastatic adenocarcinoma of the RUL    Chronic diastolic congestive heart failure    COPD (chronic obstructive pulmonary disease)    S/P radiation > 12 weeks    CHF (congestive heart failure)    History of radiation therapy    Chronic heart failure with preserved ejection fraction (HFpEF)    Narrow complex tachycardia    Atrial flutter    Encounter for examination for normal comparison and control in clinical research program    CHF exacerbation    COVID-19 virus infection    Cytokine release syndrome, grade 2    Pneumonia, unspecified organism    Hyperlipidemia    Coronary artery disease    Pneumonitis    Acute-on-chronic respiratory failure     Past Medical History:   Diagnosis Date    Arthritis     Atrial fibrillation with rapid ventricular response 2019    Blindness of left eye     BPH with obstruction/lower urinary tract symptoms     Cancer     stomach & lung    CHF (congestive heart failure)     CKD (chronic kidney  disease) stage 3, GFR 30-59 ml/min     COPD with acute exacerbation 08/03/2024    Coronary artery disease     Elevated cholesterol     Essential hypertension 10/02/2017    Fibrotic lung diseases     GERD (gastroesophageal reflux disease)     Hearing loss     History of transfusion     Hydronephrosis of left kidney     Hyperlipidemia     Hypertension     pt was taken off of all of his medications for BP (atenolol, lisinopril, lasix) because his BP kept bottoming out so his primary dr told him to discontinue them 1-2 months ago (Jan/Feb 2019). pt states he takes no medications currently.    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 Patel is just going to keep an eye on it and not do surgery right now.    Mediastinal adenopathy 10/24/2018    Station 7    Monoclonal gammopathy of unknown significance (MGUS) 09/11/2018    Pancreatic mass     pt states he had this in 2013 but it went away on its own. Now recent CT shows it has come back so he is going to have an ultrasound on 3/13/19.    Shortness of breath      Past Surgical History:   Procedure Laterality Date    ABDOMINAL SURGERY      BRONCHOSCOPY N/A 10/24/2018    Procedure: BRONCHOSCOPY WITH BIOPSY, EBUS;  Surgeon: Gareth Becerra MD;  Location: [facility] OR;  Service: Pulmonary    CHOLECYSTECTOMY      COLONOSCOPY N/A 1/3/2019    Procedure: COLONOSCOPY WITH ANESTHESIA;  Surgeon: Randy Somers DO;  Location: [facility] ENDOSCOPY;  Service: Gastroenterology    CYSTOSCOPY, RETROGRADE PYELOGRAM AND STENT INSERTION Left 3/8/2019    Procedure: CYSTOSCOPY RETROGRADE BILATERAL PYELOGRAM;  Surgeon: Jos Sylvester MD;  Location: [facility] OR;  Service: Urology    ENDOSCOPY N/A 12/11/2018    Procedure: ESOPHAGOGASTRODUODENOSCOPY WITH ANESTHESIA;  Surgeon: Randy Somers DO;  Location: [facility] ENDOSCOPY;  Service:  Gastroenterology    ENDOSCOPY N/A 4/29/2019    Procedure: ESOPHAGOGASTRODUODENOSCOPY WITH ANESTHESIA;  Surgeon: Lilliam Jj MD;  Location: [facility] OR;  Service: Gastroenterology    ENDOSCOPY N/A 5/9/2019    Procedure: ESOPHAGOGASTRODUODENOSCOPY WITH ANESTHESIA;  Surgeon: Pilo Bansal MD;  Location: [facility] ENDOSCOPY;  Service: Gastroenterology    EYE SURGERY Left 1964    FOOT SURGERY Right 1966    joint    FRACTURE SURGERY      PACEMAKER IMPLANTATION Left 8/29/2024    Procedure: Leadless pacemaker implant and AVN ablation;  Surgeon: Carlitos Bowen MD;  Location: [facility] CATH INVASIVE LOCATION;  Service: Cardiovascular;  Laterality: Left;     PT Assessment (Last 12 Hours)       PT Evaluation and Treatment       Row Name 09/22/24 1352          Physical Therapy Time and Intention    Subjective Information complains of;dyspnea  -AB     Document Type therapy note (daily note)  -AB     Mode of Treatment physical therapy  -AB       Row Name 09/22/24 1352          General Information    Existing Precautions/Restrictions fall;oxygen therapy device and L/min  Vapo therm 25L 60%  -AB       Row Name 09/22/24 1352          Bed Mobility    Supine-Sit [place] (Bed Mobility) standby assist  -AB     Sit-Supine [place] (Bed Mobility) standby assist  -AB       Row Name 09/22/24 1352          Sit-Stand Transfer    Sit-Stand [place] (Transfers) standby assist;contact guard  -AB       Row Name 09/22/24 1352          Gait/Stairs (Locomotion)    [place] Level (Gait) standby assist;contact guard  -AB     Distance in Feet (Gait) 30  x 2 with a long sitting rest between  -AB     Comment, (Gait/Stairs) took a while to recover today  -AB       Row Name 09/22/24 1352          Motor Skills    Comments, Therapeutic Exercise Sitting 15 reps Samuel LE  -AB     Additional Documentation Comments, Therapeutic Exercise (Row)  -AB       Row Name 09/22/24 1352          Plan of Care Review    Plan of Care Reviewed With patient  -AB      Outcome Evaluation He is SBA for bedmobility and CGA-SBA for sit/stand and to ambulate 30' x 2 with a long rest between to recover. Before activity he was 95 percent, he dropped to 79 percent and took over 8 minutes to recover to 88 percent. PT will continue to follow.  -AB       Row Name 09/22/24 1358          Positioning and Restraints    Pre-Treatment Position in bed  -AB     Post Treatment Position bed  -AB     In Bed fowlers;call light within reach  -AB               User Key  (r) = Recorded By, (t) = Taken By, (c) = Cosigned By      Initials Name Provider Type    AB Daria Edwards, PTA Physical Therapist Assistant                    Physical Therapy Education       Title: PT OT SLP Therapies (In Progress)       Topic: Physical Therapy (In Progress)       Point: Mobility training (Done)       Learning Progress Summary             Patient Acceptance, E, MALIK,DU,NR by LEIDY at 9/20/2024 1400    Comment: benefits of activity, progression of PT POC                         Point: Home exercise program (Not Started)       Learner Progress:  Not documented in this visit.              Point: Body mechanics (Not Started)       Learner Progress:  Not documented in this visit.              Point: Precautions (Not Started)       Learner Progress:  Not documented in this visit.                              User Key       Initials Effective Dates Name Provider Type Discipline    LEIDY 02/03/23 -  Cristian Hirsch, PT DPT Physical Therapist PT                  PT Recommendation and Plan     Plan of Care Reviewed With: patient  Outcome Evaluation: He is SBA for bedmobility and CGA-SBA for sit/stand and to ambulate 30' x 2 with a long rest between to recover. Before activity he was 95 percent, he dropped to 79 percent and took over 8 minutes to recover to 88 percent. PT will continue to follow.       Time Calculation:    PT Charges       Row Name 09/22/24 0002             Time Calculation    Start Time 1352  -AB      Stop Time  1431  -AB      Time Calculation (min) 39 min  -AB      PT Received On 09/22/24  -AB         Time Calculation- PT    Total Timed Code Minutes- PT 39 minute(s)  -AB                User Key  (r) = Recorded By, (t) = Taken By, (c) = Cosigned By      Initials Name Provider Type    Daria Loving PTA Physical Therapist Assistant                  Therapy Charges for Today       Code Description Service Date Service Provider Modifiers Qty    79405250243 HC GAIT TRAINING EA 15 MIN 9/21/2024 Daria Edwards, PTA GP 1    08492875885 HC PT THER PROC EA 15 MIN 9/21/2024 Daria Edwards, PTA GP 1    58439005294 HC GAIT TRAINING EA 15 MIN 9/22/2024 Daria Edwards, PTA GP 2    77044543658 HC PT THER PROC EA 15 MIN 9/22/2024 Daria Edwards, PTA GP 1            PT G-Codes  Outcome Measure Options: AM-PAC 6 Clicks Basic Mobility (PT)  AM-PAC 6 Clicks Score (PT): 17    Daria Edwards PTA  9/22/2024

## 2024-09-22 NOTE — PROGRESS NOTES
"      [facility]         Dr Pinon Sayied   LOS: 2 days   Patient Care Team:  Irving Alexis MD as PCP - General (General Practice)  Gareth Becerra MD as Consulting Physician (Pulmonary Disease)  Randy Somers DO as Consulting Physician (Gastroenterology)  Jos Sylvester MD as Consulting Physician (Urology)  Jeff Michele MD as Consulting Physician (Oncology)  Fidencio Miller APRN as Nurse Practitioner (Radiation Oncology)  Tarik Crocker MD as Consulting Physician (Pulmonary Disease)    Chief Complaint:  Chronic respiratory failure with pulmonary infiltrate ILD changes multiple issues as listed below    Interval History: He remains on heated high flow 25 L 55%.  Appears to be weak and tired.  He tells me he feels some better.  Short of breath with activity    REVIEW OF SYSTEMS:   CARDIOVASCULAR: No chest pain, chest pressure or chest discomfort. No palpitations or edema.   RESPIRATORY: Short of breath with activity  GASTROINTESTINAL: No anorexia, nausea, vomiting or diarrhea. No abdominal pain or blood.   HEMATOLOGIC: No bleeding or bruising.     Ventilator/Non-Invasive Ventilation Settings (From admission, onward)      None              Vital Signs  Temp:  [97.6 °F (36.4 °C)-98.4 °F (36.9 °C)] 97.6 °F (36.4 °C)  Heart Rate:  [76-90] 85  Resp:  [18-22] 22  BP: (108-143)/(57-94) 126/57    Intake/Output Summary (Last 24 hours) at 9/22/2024 1138  Last data filed at 9/22/2024 0812  Gross per 24 hour   Intake 360 ml   Output 2925 ml   Net -2565 ml     Flowsheet Rows      Flowsheet Row First Filed Value   Admission Height 162.6 cm (64\") Documented at 09/19/2024 1828   Admission Weight 74.3 kg (163 lb 12.8 oz) Documented at 09/19/2024 1843            Physical Exam:  Patient is examined using the personal protective equipment as per guidelines from infection control for this particular patient as enacted.  Hand hygiene was performed before and after patient " interaction.   General Appearance:    Alert, cooperative, in no acute distress.  Following simple commands  ENT Mallampati between 3 and 4 no nasal congestion  Neck midline trachea, no thyromegaly   Lungs:   Diminished breath sounds crackles bilaterally    Heart:    Regular rhythm and normal rate, normal S1 and S2, no            murmur, no gallop, no rub, no click   Chest Wall:    No abnormalities observed   Abdomen:     Normal bowel sounds, no masses, no organomegaly, soft        nontender, nondistended, no guarding, no rebound                tenderness   Extremities:   Moves all extremities well, no edema, no cyanosis, no             redness  CNS no focal neurological deficits normal sensory exam  Skin no rashes no nodules  Musculoskeletal no cyanosis no clubbing normal range of motion     Results Review:        Results from last 7 days   Lab Units 09/22/24  0404 09/21/24  0538 09/20/24  0433   SODIUM mmol/L 137 139 138   POTASSIUM mmol/L 4.1 3.9 3.9   CHLORIDE mmol/L 103 104 105   CO2 mmol/L 24.0 25.0 24.0   BUN mg/dL 38* 40* 39*   CREATININE mg/dL 1.91* 1.86* 1.83*   GLUCOSE mg/dL 111* 111* 93   CALCIUM mg/dL 9.5 9.5 8.9         Results from last 7 days   Lab Units 09/22/24  0404 09/21/24  0538 09/20/24  0433   WBC 10*3/mm3 7.22 7.39 8.09   HEMOGLOBIN g/dL 11.7* 11.4* 10.5*   HEMATOCRIT % 37.4* 36.9* 34.1*   PLATELETS 10*3/mm3 126* 135* 145             Results from last 7 days   Lab Units 09/21/24  0538   MAGNESIUM mg/dL 1.8               I reviewed the patient's new clinical results.  I personally viewed and interpreted the patient's chest x-ray.        Medication Review:   acetaminophen, 1,000 mg, Oral, Nightly  albuterol sulfate HFA, 2 puff, Inhalation, 4x Daily - RT   And  ipratropium, 2 puff, Inhalation, 4x Daily - RT  aspirin, 81 mg, Oral, Daily  atorvastatin, 20 mg, Oral, Nightly  budesonide-formoterol, 2 puff, Inhalation, BID - RT  bumetanide, 1 mg, Intravenous, Q12H  cefepime, 2,000 mg, Intravenous,  Q24H  empagliflozin, 10 mg, Oral, Daily  guaiFENesin, 600 mg, Oral, Q12H  isosorbide mononitrate, 30 mg, Oral, Daily Before Lunch  melatonin, 10 mg, Oral, Nightly  metoprolol succinate XL, 25 mg, Oral, Daily  pantoprazole, 40 mg, Oral, Daily  sodium bicarbonate, 650 mg, Oral, TID  sodium chloride, 10 mL, Intravenous, Q12H  tamsulosin, 0.4 mg, Oral, Nightly  vancomycin, 750 mg, Intravenous, Q24H             ASSESSMENT:   Acute on chronic respiratory failure with hypoxia  Bilateral pulmonary infiltrate  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  Chronic changes with right upper lobe mass suspicious for malignancy.  This could represent recurrent malignancy.  Recommend continue to monitor right upper lobe mass  Extensive ILD changes with probable superimposed pneumonia.  Continue current antibiotics.  De-escalate based on culture.  MRSA swab negative may discontinue vancomycin  Wean down oxygen maintain sats above 90%  Overall prognosis guarded with multiple ongoing pulmonary issues          Zi Sigala MD  09/22/24  11:38 CDT

## 2024-09-22 NOTE — PLAN OF CARE
Goal Outcome Evaluation:  Plan of Care Reviewed With: patient           Outcome Evaluation: He is SBA for bedmobility and CGA-SBA for sit/stand and to ambulate 30' x 2 with a long rest between to recover. Before activity he was 95 percent, he dropped to 79 percent and took over 8 minutes to recover to 88 percent. PT will continue to follow.

## 2024-09-22 NOTE — PROGRESS NOTES
[facility]  INPATIENT PROGRESS NOTE    Patient Name: Karoline Prater  Date of Admission: 9/19/2024  Today's Date: 09/22/24  Length of Stay: 2  Primary Care Physician: Irving Alexis MD    Subjective   Chief Complaint: acute on chronic respiratory failure with hypoxia  Shortness of Breath    Today  Patient has no new complaint, but still needing a lot of oxygen.      Review of Systems   Respiratory:  Positive for shortness of breath.       All pertinent negatives and positives are as above. All other systems have been reviewed and are negative unless otherwise stated.     Objective    Temp:  [97.6 °F (36.4 °C)-98.3 °F (36.8 °C)] 98.1 °F (36.7 °C)  Heart Rate:  [75-88] 75  Resp:  [18-26] 26  BP: (101-143)/(55-94) 101/55  Physical Exam  GEN: Awake, alert, interactive, in NAD, appears frail/chronically ill  HEENT: L eye blind, R eye normal motion/pupillary reflex. Anicteric  Lungs: diminished bs, equal chest rist, normal respiratory effort  Heart: +S1/s2, no rub  ABD: soft, nt/nd, +BS, no guarding/rebound  Extremities: atraumatic, no cyanosis, trace b/l LE edema  Skin: no rashes or petechiae  Neuro: AAOx3, no obvious focal deficits  Psych: normal mood & affect        Results Review:  I have reviewed the labs, radiology results, and diagnostic studies.    Laboratory Data:   Results from last 7 days   Lab Units 09/22/24  0404 09/21/24 0538 09/20/24 0433   WBC 10*3/mm3 7.22 7.39 8.09   HEMOGLOBIN g/dL 11.7* 11.4* 10.5*   HEMATOCRIT % 37.4* 36.9* 34.1*   PLATELETS 10*3/mm3 126* 135* 145        Results from last 7 days   Lab Units 09/22/24  0404 09/21/24  0538 09/20/24  0433 09/19/24 1928 09/19/24 1928 09/18/24  1051   SODIUM mmol/L 137 139 138   < > 139 139   POTASSIUM mmol/L 4.1 3.9 3.9   < > 4.4 4.4   CHLORIDE mmol/L 103 104 105   < > 103 103   TOTAL CO2 mmol/L  --   --   --   --   --  25   CO2 mmol/L 24.0 25.0 24.0   < > 26.0  --    BUN mg/dL 38* 40*  39*   < > 37* 38*   CREATININE mg/dL 1.91* 1.86* 1.83*   < > 1.79* 1.3*   CALCIUM mg/dL 9.5 9.5 8.9   < > 9.8 10.3*   BILIRUBIN mg/dL  --  0.4  --   --  0.6 0.5   ALK PHOS U/L  --  80  --   --  100 102   ALT (SGPT) U/L  --  34  --   --  46* 41   AST (SGOT) U/L  --  31  --   --  41* 32   GLUCOSE mg/dL 111* 111* 93   < > 113* 117*    < > = values in this interval not displayed.       Culture Data:   Blood Culture   Date Value Ref Range Status   09/19/2024 No growth at 24 hours  Preliminary   09/19/2024 No growth at 24 hours  Preliminary       Radiology Data:   Imaging Results (Last 24 Hours)       ** No results found for the last 24 hours. **            I have reviewed the patient's current medications.     Assessment/Plan   Assessment  Active Hospital Problems    Diagnosis     **Acute on chronic respiratory failure with hypoxia     Acute-on-chronic respiratory failure     Pneumonitis     COVID-19 virus infection     Chronic heart failure with preserved ejection fraction (HFpEF)     COPD (chronic obstructive pulmonary disease)     A-fib     Pulmonary fibrosis     Stage 3b chronic kidney disease     Malignant neoplasm of upper lobe of right lung     Essential hypertension        Treatment Plan  #Acute on chronic respiratory failure with hypoxia -patient with worsening baseline hypoxia.  Underlying ILD.  Recent COVID.  Concern for possible secondary bacterial pneumonia.  Elevated Pro-Skinny  Pulmonologist is on consult and helping with management, follow recommendations    # Probable hospital-associated pneumonia  CT of the chest showed advanced chronic lung disease with suspected pneumonia.  Pulmonologist reviewed patient and started empiric antibiotic coverage with cefepime and vancomycin, will follow workup and pulmonologist recommendations.    #Acute on chronic preserved EF CHF -hard to say fully.  Patient is diuresing well.  Likely some slight fluid overload.  Continue to monitor diuretic response.  Continue  Toprol-XL, Jardiance.  Not on additional goal-directed regimen due to renal function.    #CKD 3B -seems to be around baseline.  Monitor closely with diuretics and meds.    #COVID-19 -initially positive early September.  Still positive.  Suspect this is less likely acutely driving current illness    #Essential hypertension -BP stable on Toprol and Imdur    #COPD/Pulm fibrosis -supportive care    Medical Decision Making  Number and Complexity of problems: 1 acute, 2 acute on chronic, multiple chronic  Differential Diagnosis: as above    Conditions and Status        Clinically about the same as when I saw him yesterday, not at goal     MDM Data  External documents reviewed: none  Cardiac tracing (EKG, telemetry) interpretation: paced  Radiology interpretation: reports reviewed  Labs reviewed: as above  Any tests that were considered but not ordered: none     Decision rules/scores evaluated (example [id number], Wells, etc): none     Discussed with: patient, nursing, respiratory therapy, Dr. Sigala     Care Planning  Shared decision making: Patient apprised of current labs, vitals, imaging and treatment plan.  They are agreeable with proceeding with plans as discussed.    Code status and discussions: full code    Disposition  Social Determinants of Health that impact treatment or disposition: none  I expect the patient to be discharge when appropriate, several days yet, f/u therapy notes    Electronically signed by Mychal Bagley MD, 09/22/24, 16:49 CDT.

## 2024-09-22 NOTE — PLAN OF CARE
Problem: Adult Inpatient Plan of Care  Goal: Plan of Care Review  Outcome: Ongoing, Progressing  Goal: Patient-Specific Goal (Individualized)  Outcome: Ongoing, Progressing  Goal: Absence of Hospital-Acquired Illness or Injury  Outcome: Ongoing, Progressing  Intervention: Identify and Manage Fall Risk  Recent Flowsheet Documentation  Taken 9/22/2024 0400 by Al Ghosh RN  Safety Promotion/Fall Prevention: safety round/check completed  Taken 9/22/2024 0200 by Al Ghosh RN  Safety Promotion/Fall Prevention: safety round/check completed  Taken 9/22/2024 0000 by Al Ghosh RN  Safety Promotion/Fall Prevention: safety round/check completed  Taken 9/21/2024 2200 by Al Ghosh RN  Safety Promotion/Fall Prevention: safety round/check completed  Taken 9/21/2024 2100 by Al Ghosh RN  Safety Promotion/Fall Prevention: safety round/check completed  Taken 9/21/2024 2000 by Al Ghosh RN  Safety Promotion/Fall Prevention: safety round/check completed  Taken 9/21/2024 1900 by Al Ghosh RN  Safety Promotion/Fall Prevention: safety round/check completed  Goal: Optimal Comfort and Wellbeing  Outcome: Ongoing, Progressing  Intervention: Provide Person-Centered Care  Recent Flowsheet Documentation  Taken 9/21/2024 2017 by Al Ghosh RN  Trust Relationship/Rapport:   care explained   choices provided   emotional support provided   empathic listening provided   questions answered   questions encouraged  Goal: Readiness for Transition of Care  Outcome: Ongoing, Progressing     Problem: Asthma Comorbidity  Goal: Maintenance of Asthma Control  Outcome: Ongoing, Progressing     Problem: Behavioral Health Comorbidity  Goal: Maintenance of Behavioral Health Symptom Control  Outcome: Ongoing, Progressing     Problem: COPD (Chronic Obstructive Pulmonary Disease) Comorbidity  Goal: Maintenance of COPD Symptom Control  Outcome: Ongoing, Progressing     Problem: Diabetes Comorbidity  Goal: Blood Glucose Level Within Targeted  Range  Outcome: Ongoing, Progressing     Problem: Heart Failure Comorbidity  Goal: Maintenance of Heart Failure Symptom Control  Outcome: Ongoing, Progressing     Problem: Hypertension Comorbidity  Goal: Blood Pressure in Desired Range  Outcome: Ongoing, Progressing     Problem: Obstructive Sleep Apnea Risk or Actual Comorbidity Management  Goal: Unobstructed Breathing During Sleep  Outcome: Ongoing, Progressing     Problem: Osteoarthritis Comorbidity  Goal: Maintenance of Osteoarthritis Symptom Control  Outcome: Ongoing, Progressing     Problem: Pain Chronic (Persistent) (Comorbidity Management)  Goal: Acceptable Pain Control and Functional Ability  Outcome: Ongoing, Progressing     Problem: Seizure Disorder Comorbidity  Goal: Maintenance of Seizure Control  Outcome: Ongoing, Progressing     Problem: Adjustment to Illness (Sepsis/Septic Shock)  Goal: Optimal Coping  Outcome: Ongoing, Progressing     Problem: Bleeding (Sepsis/Septic Shock)  Goal: Absence of Bleeding  Outcome: Ongoing, Progressing     Problem: Glycemic Control Impaired (Sepsis/Septic Shock)  Goal: Blood Glucose Level Within Desired Range  Outcome: Ongoing, Progressing     Problem: Infection Progression (Sepsis/Septic Shock)  Goal: Absence of Infection Signs and Symptoms  Outcome: Ongoing, Progressing     Problem: Nutrition Impaired (Sepsis/Septic Shock)  Goal: Optimal Nutrition Intake  Outcome: Ongoing, Progressing     Problem: Fall Injury Risk  Goal: Absence of Fall and Fall-Related Injury  Outcome: Ongoing, Progressing  Intervention: Promote Injury-Free Environment  Recent Flowsheet Documentation  Taken 9/22/2024 0400 by Al Ghosh RN  Safety Promotion/Fall Prevention: safety round/check completed  Taken 9/22/2024 0200 by Al Ghosh RN  Safety Promotion/Fall Prevention: safety round/check completed  Taken 9/22/2024 0000 by Al Ghosh RN  Safety Promotion/Fall Prevention: safety round/check completed  Taken 9/21/2024 2200 by Al Ghosh  RN  Safety Promotion/Fall Prevention: safety round/check completed  Taken 9/21/2024 2100 by Al Ghosh RN  Safety Promotion/Fall Prevention: safety round/check completed  Taken 9/21/2024 2000 by Al Ghosh RN  Safety Promotion/Fall Prevention: safety round/check completed  Taken 9/21/2024 1900 by Al Ghosh RN  Safety Promotion/Fall Prevention: safety round/check completed   Goal Outcome Evaluation:            Patient is a/o x 4. Patient has not had any complaints of pain. Patient has slept part of the night. Patient has not had any adverse events occur.

## 2024-09-23 LAB
BACTERIA SPEC RESP CULT: NORMAL
GRAM STN SPEC: NORMAL

## 2024-09-23 PROCEDURE — 94761 N-INVAS EAR/PLS OXIMETRY MLT: CPT

## 2024-09-23 PROCEDURE — 93005 ELECTROCARDIOGRAM TRACING: CPT | Performed by: INTERNAL MEDICINE

## 2024-09-23 PROCEDURE — 93010 ELECTROCARDIOGRAM REPORT: CPT | Performed by: STUDENT IN AN ORGANIZED HEALTH CARE EDUCATION/TRAINING PROGRAM

## 2024-09-23 PROCEDURE — 97116 GAIT TRAINING THERAPY: CPT

## 2024-09-23 PROCEDURE — 99232 SBSQ HOSP IP/OBS MODERATE 35: CPT | Performed by: INTERNAL MEDICINE

## 2024-09-23 PROCEDURE — 94799 UNLISTED PULMONARY SVC/PX: CPT

## 2024-09-23 PROCEDURE — 25010000002 BUMETANIDE PER 0.5 MG: Performed by: FAMILY MEDICINE

## 2024-09-23 PROCEDURE — 25010000002 CEFEPIME PER 500 MG: Performed by: INTERNAL MEDICINE

## 2024-09-23 PROCEDURE — 97110 THERAPEUTIC EXERCISES: CPT

## 2024-09-23 RX ORDER — ALPRAZOLAM 0.5 MG
0.5 TABLET ORAL 3 TIMES DAILY PRN
Status: DISPENSED | OUTPATIENT
Start: 2024-09-23 | End: 2024-09-28

## 2024-09-23 RX ADMIN — BUDESONIDE AND FORMOTEROL FUMARATE DIHYDRATE 2 PUFF: 160; 4.5 AEROSOL RESPIRATORY (INHALATION) at 06:27

## 2024-09-23 RX ADMIN — Medication 10 ML: at 08:40

## 2024-09-23 RX ADMIN — IPRATROPIUM BROMIDE 2 PUFF: 17 AEROSOL, METERED RESPIRATORY (INHALATION) at 14:37

## 2024-09-23 RX ADMIN — BUMETANIDE 1 MG: 0.25 INJECTION INTRAMUSCULAR; INTRAVENOUS at 17:11

## 2024-09-23 RX ADMIN — IPRATROPIUM BROMIDE 2 PUFF: 17 AEROSOL, METERED RESPIRATORY (INHALATION) at 19:04

## 2024-09-23 RX ADMIN — ALBUTEROL SULFATE 2 PUFF: 108 INHALANT RESPIRATORY (INHALATION) at 06:26

## 2024-09-23 RX ADMIN — SODIUM BICARBONATE 650 MG: 650 TABLET ORAL at 20:46

## 2024-09-23 RX ADMIN — BUMETANIDE 1 MG: 0.25 INJECTION INTRAMUSCULAR; INTRAVENOUS at 06:32

## 2024-09-23 RX ADMIN — IPRATROPIUM BROMIDE 2 PUFF: 17 AEROSOL, METERED RESPIRATORY (INHALATION) at 06:26

## 2024-09-23 RX ADMIN — SODIUM BICARBONATE 650 MG: 650 TABLET ORAL at 08:40

## 2024-09-23 RX ADMIN — ALBUTEROL SULFATE 2 PUFF: 108 INHALANT RESPIRATORY (INHALATION) at 19:04

## 2024-09-23 RX ADMIN — ALPRAZOLAM 0.5 MG: 0.5 TABLET ORAL at 15:00

## 2024-09-23 RX ADMIN — PANTOPRAZOLE SODIUM 40 MG: 40 TABLET, DELAYED RELEASE ORAL at 08:40

## 2024-09-23 RX ADMIN — Medication 10 ML: at 06:32

## 2024-09-23 RX ADMIN — GUAIFENESIN 600 MG: 600 TABLET, EXTENDED RELEASE ORAL at 08:40

## 2024-09-23 RX ADMIN — Medication 10 MG: at 20:46

## 2024-09-23 RX ADMIN — CEFEPIME 2000 MG: 2 INJECTION, POWDER, FOR SOLUTION INTRAVENOUS at 12:12

## 2024-09-23 RX ADMIN — ACETAMINOPHEN 1000 MG: 500 TABLET, FILM COATED ORAL at 20:46

## 2024-09-23 RX ADMIN — IPRATROPIUM BROMIDE 2 PUFF: 17 AEROSOL, METERED RESPIRATORY (INHALATION) at 10:20

## 2024-09-23 RX ADMIN — ATORVASTATIN CALCIUM 20 MG: 10 TABLET, FILM COATED ORAL at 20:46

## 2024-09-23 RX ADMIN — SODIUM BICARBONATE 650 MG: 650 TABLET ORAL at 17:03

## 2024-09-23 RX ADMIN — EMPAGLIFLOZIN 10 MG: 10 TABLET, FILM COATED ORAL at 08:39

## 2024-09-23 RX ADMIN — ISOSORBIDE MONONITRATE 30 MG: 30 TABLET, EXTENDED RELEASE ORAL at 12:12

## 2024-09-23 RX ADMIN — ALBUTEROL SULFATE 2 PUFF: 108 INHALANT RESPIRATORY (INHALATION) at 14:37

## 2024-09-23 RX ADMIN — BUDESONIDE AND FORMOTEROL FUMARATE DIHYDRATE 2 PUFF: 160; 4.5 AEROSOL RESPIRATORY (INHALATION) at 19:04

## 2024-09-23 RX ADMIN — Medication 10 ML: at 20:47

## 2024-09-23 RX ADMIN — GUAIFENESIN 600 MG: 600 TABLET, EXTENDED RELEASE ORAL at 20:46

## 2024-09-23 RX ADMIN — ALBUTEROL SULFATE 2 PUFF: 108 INHALANT RESPIRATORY (INHALATION) at 10:21

## 2024-09-23 RX ADMIN — METOPROLOL SUCCINATE 25 MG: 25 TABLET, EXTENDED RELEASE ORAL at 09:45

## 2024-09-23 RX ADMIN — TAMSULOSIN HYDROCHLORIDE 0.4 MG: 0.4 CAPSULE ORAL at 20:46

## 2024-09-23 RX ADMIN — ASPIRIN 81 MG: 81 TABLET, COATED ORAL at 08:39

## 2024-09-23 NOTE — PROGRESS NOTES
"      [facility]         Dr Pinon Sayied   LOS: 3 days   Patient Care Team:  Irving Alexis MD as PCP - General (General Practice)  Gareth Becerra MD as Consulting Physician (Pulmonary Disease)  Randy Somers DO as Consulting Physician (Gastroenterology)  Jos Sylvester MD as Consulting Physician (Urology)  Jeff Michele MD as Consulting Physician (Oncology)  Fidencio Miller APRN as Nurse Practitioner (Radiation Oncology)  Tarik Crocker MD as Consulting Physician (Pulmonary Disease)    Chief Complaint:  Chronic respiratory failure with pulmonary infiltrate ILD changes multiple issues as listed below    Interval History: He remains on heated high flow 25 L 60 %.  More alert today but remains weak and tired overall    REVIEW OF SYSTEMS:   CARDIOVASCULAR: No chest pain, chest pressure or chest discomfort. No palpitations or edema.   RESPIRATORY: Short of breath with activity  GASTROINTESTINAL: No anorexia, nausea, vomiting or diarrhea. No abdominal pain or blood.   HEMATOLOGIC: No bleeding or bruising.     Ventilator/Non-Invasive Ventilation Settings (From admission, onward)      None              Vital Signs  Temp:  [97.4 °F (36.3 °C)-99.6 °F (37.6 °C)] 98.3 °F (36.8 °C)  Heart Rate:  [75-94] 90  Resp:  [20-26] 20  BP: (101-160)/(55-81) 160/81    Intake/Output Summary (Last 24 hours) at 9/23/2024 0954  Last data filed at 9/23/2024 0832  Gross per 24 hour   Intake 360 ml   Output 2050 ml   Net -1690 ml     Flowsheet Rows      Flowsheet Row First Filed Value   Admission Height 162.6 cm (64\") Documented at 09/19/2024 1828   Admission Weight 74.3 kg (163 lb 12.8 oz) Documented at 09/19/2024 1843            Physical Exam:  Patient is examined using the personal protective equipment as per guidelines from infection control for this particular patient as enacted.  Hand hygiene was performed before and after patient interaction.   General Appearance:    Alert, " cooperative, in no acute distress.  Following simple commands  ENT Mallampati between 3 and 4 no nasal congestion  Neck midline trachea, no thyromegaly   Lungs:   Diminished breath sounds crackles bilaterally    Heart:    Regular rhythm and normal rate, normal S1 and S2, no            murmur, no gallop, no rub, no click   Chest Wall:    No abnormalities observed   Abdomen:     Normal bowel sounds, no masses, no organomegaly, soft        nontender, nondistended, no guarding, no rebound                tenderness   Extremities:   Moves all extremities well, no edema, no cyanosis, no             redness  CNS no focal neurological deficits normal sensory exam  Skin no rashes no nodules  Musculoskeletal no cyanosis no clubbing normal range of motion     Results Review:        Results from last 7 days   Lab Units 09/22/24  0404 09/21/24  0538 09/20/24  0433   SODIUM mmol/L 137 139 138   POTASSIUM mmol/L 4.1 3.9 3.9   CHLORIDE mmol/L 103 104 105   CO2 mmol/L 24.0 25.0 24.0   BUN mg/dL 38* 40* 39*   CREATININE mg/dL 1.91* 1.86* 1.83*   GLUCOSE mg/dL 111* 111* 93   CALCIUM mg/dL 9.5 9.5 8.9         Results from last 7 days   Lab Units 09/22/24  0404 09/21/24  0538 09/20/24  0433   WBC 10*3/mm3 7.22 7.39 8.09   HEMOGLOBIN g/dL 11.7* 11.4* 10.5*   HEMATOCRIT % 37.4* 36.9* 34.1*   PLATELETS 10*3/mm3 126* 135* 145             Results from last 7 days   Lab Units 09/21/24  0538   MAGNESIUM mg/dL 1.8               I reviewed the patient's new clinical results.  I personally viewed and interpreted the patient's chest x-ray.        Medication Review:   acetaminophen, 1,000 mg, Oral, Nightly  albuterol sulfate HFA, 2 puff, Inhalation, 4x Daily - RT   And  ipratropium, 2 puff, Inhalation, 4x Daily - RT  aspirin, 81 mg, Oral, Daily  atorvastatin, 20 mg, Oral, Nightly  budesonide-formoterol, 2 puff, Inhalation, BID - RT  bumetanide, 1 mg, Intravenous, Q12H  cefepime, 2,000 mg, Intravenous, Q24H  empagliflozin, 10 mg, Oral,  Daily  guaiFENesin, 600 mg, Oral, Q12H  isosorbide mononitrate, 30 mg, Oral, Daily Before Lunch  melatonin, 10 mg, Oral, Nightly  metoprolol succinate XL, 25 mg, Oral, Daily  pantoprazole, 40 mg, Oral, Daily  sodium bicarbonate, 650 mg, Oral, TID  sodium chloride, 10 mL, Intravenous, Q12H  tamsulosin, 0.4 mg, Oral, Nightly             ASSESSMENT:   Acute on chronic respiratory failure with hypoxia  Bilateral pulmonary infiltrate  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  This could represent recurrent malignancy with possible lymphangitic spread.  Recommend continue to monitor right upper lobe mass  Extensive ILD changes with probable superimposed pneumonia.  Continue current antibiotics.  De-escalate based on culture.  MRSA swab negative may discontinue vancomycin  Wean down oxygen maintain sats above 90%  Overall prognosis guarded with multiple ongoing pulmonary issues  Would recommend further discussion with family regarding goals of care          Zi Sigala MD  09/23/24  09:54 CDT

## 2024-09-23 NOTE — CASE MANAGEMENT/SOCIAL WORK
Continued Stay Note   [place]     Patient Name: Karoline Prater  MRN: [id number]  Today's Date: 9/23/2024    Admit Date: 9/19/2024    Plan: Home with hh   Discharge Plan       Row Name 09/23/24 1110       Plan    Plan Home with hh    Patient/Family in Agreement with Plan yes    Plan Comments Chart reviewed; events noted.  Pt plans home with brother.  Therapy recommends home health; SW will await MD orders to arrange.                   Discharge Codes    No documentation.                 Expected Discharge Date and Time       Expected Discharge Date Expected Discharge Time    Sep 23, 2024               ANDI Yeung

## 2024-09-23 NOTE — PLAN OF CARE
Goal Outcome Evaluation:  Plan of Care Reviewed With: patient           Outcome Evaluation: Pt AOX4, on vapotherm 25L/60%, SOB with exertion, voiding and using urinal well, safety maintained and will cont to monitor.

## 2024-09-23 NOTE — PLAN OF CARE
Goal Outcome Evaluation:  Plan of Care Reviewed With: patient        Progress: improving  Outcome Evaluation: PT tx completed. Pt in bed and agrees to therapy with encouragement. Cont on vapotherm. T/f to EOB with SBA. Pt switched to nonrebreather 15L for activity. Completed BLE AROM seated x15 reps. Amb 30' at a time with CGA/min x1. Unsteady with turns. Requires extensive seated rest break d/t increased work of  breathing and fatigue. O2 sats decrease to 80s briefly. Pt agreeable to sit up in chair. Will follow.      Anticipated Discharge Disposition (PT): home with 24/7 care, home with home health

## 2024-09-23 NOTE — PROGRESS NOTES
[facility] Medicine Services  INPATIENT PROGRESS NOTE    Patient Name: Karoline Prater  Date of Admission: 9/19/2024  Today's Date: 09/23/24  Length of Stay: 3  Primary Care Physician: Irving Alexis MD    Subjective   Chief Complaint: acute on chronic respiratory failure with hypoxia  Shortness of Breath    Today  Patient complained of anxiety and requested for anxiolytic. Okay okay          Review of Systems   Respiratory:  Positive for shortness of breath.       All pertinent negatives and positives are as above. All other systems have been reviewed and are negative unless otherwise stated.     Objective    Temp:  [97.4 °F (36.3 °C)-99.6 °F (37.6 °C)] 97.8 °F (36.6 °C)  Heart Rate:  [59-94] 60  Resp:  [20-24] 20  BP: (126-160)/(65-81) 136/72  Physical Exam  GEN: Awake, alert, interactive, in NAD, appears frail/chronically ill  HEENT: L eye blind, R eye normal motion/pupillary reflex. Anicteric  Lungs: diminished bs, equal chest rist, normal respiratory effort  Heart: +S1/s2, no rub  ABD: soft, nt/nd, +BS, no guarding/rebound  Extremities: atraumatic, no cyanosis, trace b/l LE edema  Skin: no rashes or petechiae  Neuro: AAOx3, no obvious focal deficits  Psych: normal mood & affect        Results Review:  I have reviewed the labs, radiology results, and diagnostic studies.    Laboratory Data:   Results from last 7 days   Lab Units 09/22/24 0404 09/21/24 0538 09/20/24  0433   WBC 10*3/mm3 7.22 7.39 8.09   HEMOGLOBIN g/dL 11.7* 11.4* 10.5*   HEMATOCRIT % 37.4* 36.9* 34.1*   PLATELETS 10*3/mm3 126* 135* 145        Results from last 7 days   Lab Units 09/22/24  0404 09/21/24  0538 09/20/24  0433 09/19/24 1928 09/19/24 1928 09/18/24  1051   SODIUM mmol/L 137 139 138   < > 139 139   POTASSIUM mmol/L 4.1 3.9 3.9   < > 4.4 4.4   CHLORIDE mmol/L 103 104 105   < > 103 103   TOTAL CO2 mmol/L  --   --   --   --   --  25   CO2 mmol/L 24.0 25.0 24.0   < > 26.0  --    BUN mg/dL  38* 40* 39*   < > 37* 38*   CREATININE mg/dL 1.91* 1.86* 1.83*   < > 1.79* 1.3*   CALCIUM mg/dL 9.5 9.5 8.9   < > 9.8 10.3*   BILIRUBIN mg/dL  --  0.4  --   --  0.6 0.5   ALK PHOS U/L  --  80  --   --  100 102   ALT (SGPT) U/L  --  34  --   --  46* 41   AST (SGOT) U/L  --  31  --   --  41* 32   GLUCOSE mg/dL 111* 111* 93   < > 113* 117*    < > = values in this interval not displayed.       Culture Data:   Blood Culture   Date Value Ref Range Status   09/19/2024 No growth at 24 hours  Preliminary   09/19/2024 No growth at 24 hours  Preliminary       Radiology Data:   Imaging Results (Last 24 Hours)       ** No results found for the last 24 hours. **            I have reviewed the patient's current medications.     Assessment/Plan   Assessment  Active Hospital Problems    Diagnosis     **Acute on chronic respiratory failure with hypoxia     Acute-on-chronic respiratory failure     Pneumonitis     COVID-19 virus infection     Chronic heart failure with preserved ejection fraction (HFpEF)     COPD (chronic obstructive pulmonary disease)     A-fib     Pulmonary fibrosis     Stage 3b chronic kidney disease     Malignant neoplasm of upper lobe of right lung     Essential hypertension        Treatment Plan  #Acute on chronic respiratory failure with hypoxia -patient with worsening baseline hypoxia.  Underlying ILD.  Recent COVID.  Concern for possible secondary bacterial pneumonia.  Elevated Pro-Skinny  Pulmonologist is on consult and helping with management, follow recommendations  Patient's oxygen requirement improved this morning, currently needing 20 L [was on 25 yesterday].    # Probable hospital-associated pneumonia  CT of the chest showed advanced chronic lung disease with suspected pneumonia.  Pulmonologist reviewed patient and started empiric antibiotic coverage with cefepime and vancomycin [discontinued], will follow workup and pulmonologist recommendations.    # Right upper lobe mass on CT  CT showed the  following-  Spiculated masslike opacity of the right upper lobe, similar to only  minimally decreased compared to 8/3/2024, increased compared to  6/29/2024. Neoplastic process must be considered.  Discussed with pulmonologist and he recommended palliative/hospice evaluation.    #Acute on chronic preserved EF CHF -hard to say fully.  Patient is diuresing well.  Likely some slight fluid overload.  Continue to monitor diuretic response.  Continue Toprol-XL, Jardiance.  Not on additional goal-directed regimen due to renal function.    #CKD 3B -seems to be around baseline.  Monitor closely with diuretics and meds.    #COVID-19 -initially positive early September.  Still positive.  Suspect this is less likely acutely driving current illness    #Essential hypertension -BP stable on Toprol and Imdur    #COPD/Pulm fibrosis -supportive care    Medical Decision Making  Number and Complexity of problems: 1 acute, 2 acute on chronic, multiple chronic  Differential Diagnosis: as above    Conditions and Status        Clinically about the same as when I saw him yesterday, not at goal     MDM Data  External documents reviewed: none  Cardiac tracing (EKG, telemetry) interpretation: paced  Radiology interpretation: reports reviewed  Labs reviewed: as above  Any tests that were considered but not ordered: none     Decision rules/scores evaluated (example [id number], Wells, etc): none     Discussed with: patient, nursing, respiratory therapy, Dr. Sigala     Care Planning  Shared decision making: Patient apprised of current labs, vitals, imaging and treatment plan.  They are agreeable with proceeding with plans as discussed.    Code status and discussions: full code    Disposition  Social Determinants of Health that impact treatment or disposition: none  I expect the patient to be discharge when appropriate, several days yet, f/u therapy notes    Electronically signed by Mychal Bagley MD, 09/23/24, 15:21 CDT.

## 2024-09-23 NOTE — PLAN OF CARE
Goal Outcome Evaluation: Pt is alert and oriented, no c/o pain, IV is clean dry and intact, no s/s of distress, pt does c/o the noise in his room from the filter and the marianna and MD ordered him xanax to help him relax,            Progress: improving

## 2024-09-23 NOTE — THERAPY TREATMENT NOTE
Acute Care - Physical Therapy Treatment Note  [facility]     Patient Name: Karoline Prater  : 1940  MRN: [id number]  Today's Date: 2024      Visit Dx:     ICD-10-CM ICD-9-CM   1. Hypoxia  R09.02 799.02   2. Pneumonitis  J98.4 486   3. Acute UTI  N39.0 599.0   4. Impaired mobility [Z74.09]  Z74.09 799.89     Patient Active Problem List   Diagnosis    Dyspnea    Essential hypertension    NSVT (nonsustained ventricular tachycardia)    Malignant neoplasm of upper lobe of right lung    Stage 3b chronic kidney disease    Pancytopenia due to antineoplastic chemotherapy    Pneumonia due to infectious organism    Function kidney decreased    Cellulitis    Acute on chronic respiratory failure with hypoxia    Pulmonary fibrosis    Macrocytic anemia    Thrombocytopenia    Sepsis    Right pneumothorax    Hyperkalemia    Acute kidney injury superimposed on CKD stage 3b    GERD without esophagitis    A-fib    Former smoker    Chest pain    Tachycardia    Bilateral lower extremity edema    Metastatic adenocarcinoma of the RUL    Chronic diastolic congestive heart failure    COPD (chronic obstructive pulmonary disease)    S/P radiation > 12 weeks    CHF (congestive heart failure)    History of radiation therapy    Chronic heart failure with preserved ejection fraction (HFpEF)    Narrow complex tachycardia    Atrial flutter    Encounter for examination for normal comparison and control in clinical research program    CHF exacerbation    COVID-19 virus infection    Cytokine release syndrome, grade 2    Pneumonia, unspecified organism    Hyperlipidemia    Coronary artery disease    Pneumonitis    Acute-on-chronic respiratory failure     Past Medical History:   Diagnosis Date    Arthritis     Atrial fibrillation with rapid ventricular response 2019    Blindness of left eye     BPH with obstruction/lower urinary tract symptoms     Cancer     stomach & lung    CHF (congestive heart failure)     CKD (chronic kidney  disease) stage 3, GFR 30-59 ml/min     COPD with acute exacerbation 08/03/2024    Coronary artery disease     Elevated cholesterol     Essential hypertension 10/02/2017    Fibrotic lung diseases     GERD (gastroesophageal reflux disease)     Hearing loss     History of transfusion     Hydronephrosis of left kidney     Hyperlipidemia     Hypertension     pt was taken off of all of his medications for BP (atenolol, lisinopril, lasix) because his BP kept bottoming out so his primary dr told him to discontinue them 1-2 months ago (Jan/Feb 2019). pt states he takes no medications currently.    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 Patel is just going to keep an eye on it and not do surgery right now.    Mediastinal adenopathy 10/24/2018    [facility]    Monoclonal gammopathy of unknown significance (MGUS) 09/11/2018    Pancreatic mass     pt states he had this in 2013 but it went away on its own. Now recent CT shows it has come back so he is going to have an ultrasound on 3/13/19.    Shortness of breath      Past Surgical History:   Procedure Laterality Date    ABDOMINAL SURGERY      BRONCHOSCOPY N/A 10/24/2018    Procedure: BRONCHOSCOPY WITH BIOPSY, EBUS;  Surgeon: Gareth Becerra MD;  Location: [facility] OR;  Service: Pulmonary    CHOLECYSTECTOMY      COLONOSCOPY N/A 1/3/2019    Procedure: COLONOSCOPY WITH ANESTHESIA;  Surgeon: Randy Somers DO;  Location: [facility] ENDOSCOPY;  Service: Gastroenterology    CYSTOSCOPY, RETROGRADE PYELOGRAM AND STENT INSERTION Left 3/8/2019    Procedure: CYSTOSCOPY RETROGRADE BILATERAL PYELOGRAM;  Surgeon: Jos Sylvester MD;  Location: [facility] OR;  Service: Urology    ENDOSCOPY N/A 12/11/2018    Procedure: ESOPHAGOGASTRODUODENOSCOPY WITH ANESTHESIA;  Surgeon: Randy Somers DO;  Location: [facility] ENDOSCOPY;  Service:  Gastroenterology    ENDOSCOPY N/A 4/29/2019    Procedure: ESOPHAGOGASTRODUODENOSCOPY WITH ANESTHESIA;  Surgeon: Lilliam Jj MD;  Location: [facility] OR;  Service: Gastroenterology    ENDOSCOPY N/A 5/9/2019    Procedure: ESOPHAGOGASTRODUODENOSCOPY WITH ANESTHESIA;  Surgeon: Pilo Bansal MD;  Location: [facility] ENDOSCOPY;  Service: Gastroenterology    EYE SURGERY Left 1964    FOOT SURGERY Right 1966    joint    FRACTURE SURGERY      PACEMAKER IMPLANTATION Left 8/29/2024    Procedure: Leadless pacemaker implant and AVN ablation;  Surgeon: Carlitos Bowen MD;  Location: [facility] CATH INVASIVE LOCATION;  Service: Cardiovascular;  Laterality: Left;     PT Assessment (Last 12 Hours)       PT Evaluation and Treatment       Row Name 09/23/24 1310          Physical Therapy Time and Intention    Subjective Information complains of;weakness  -LY     Document Type therapy note (daily note)  -LY     Mode of Treatment physical therapy  -LY       Row Name 09/23/24 1310          General Information    Existing Precautions/Restrictions fall;oxygen therapy device and L/min  Vapo therm 20L 60%, nonrebreather for activity 15L  -LY       Row Name 09/23/24 1310          Pain    Pretreatment Pain Rating 0/10 - no pain  -LY       Row Name 09/23/24 1310          Bed Mobility    Supine-Sit Arroyo (Bed Mobility) standby assist  -LY     Assistive Device (Bed Mobility) head of bed elevated;bed rails  -LY       Row Name 09/23/24 1310          Sit-Stand Transfer    Sit-Stand Arroyo (Transfers) standby assist;contact guard  -LY       Row Name 09/23/24 1310          Gait/Stairs (Locomotion)    Arroyo Level (Gait) standby assist;contact guard  -LY     Distance in Feet (Gait) 30  x2  -LY     Pattern (Gait) step-through  -LY     Deviations/Abnormal Patterns (Gait) gait speed decreased  -LY     Bilateral Gait Deviations forward flexed posture;heel strike decreased  -LY     Comment, (Gait/Stairs) extensive seated rest break d/t fatigue  and increased work of breathing  -LY       Row Name 09/23/24 1310          Motor Skills    Comments, Therapeutic Exercise BLE AROM x15 reps seated  -LY       Row Name 09/23/24 1310          Plan of Care Review    Plan of Care Reviewed With patient  -LY     Progress improving  -LY     Outcome Evaluation PT tx completed. Pt in bed and agrees to therapy with encouragement. Cont on vapotherm. T/f to EOB with SBA. Pt switched to nonrebreather 15L for activity. Completed BLE AROM seated x15 reps. Amb 30' at a time with CGA/min x1. Unsteady with turns. Requires extensive seated rest break d/t increased work of  breathing and fatigue. O2 sats decrease to 80s briefly. Pt agreeable to sit up in chair. Will follow.  -LY       Row Name 09/23/24 1310          Positioning and Restraints    Pre-Treatment Position in bed  -LY     Post Treatment Position chair  -LY     In Chair notified nsg;reclined;call light within reach;encouraged to call for assist;waffle cushion  -LY               User Key  (r) = Recorded By, (t) = Taken By, (c) = Cosigned By      Initials Name Provider Type    LY Susanne Lizarraga, PTA Physical Therapist Assistant                    Physical Therapy Education       Title: PT OT SLP Therapies (In Progress)       Topic: Physical Therapy (In Progress)       Point: Mobility training (Done)       Learning Progress Summary             Patient Acceptance, E, VU,DU,NR by LEIDY at 9/20/2024 1400    Comment: benefits of activity, progression of PT POC                         Point: Home exercise program (Not Started)       Learner Progress:  Not documented in this visit.              Point: Body mechanics (Not Started)       Learner Progress:  Not documented in this visit.              Point: Precautions (Not Started)       Learner Progress:  Not documented in this visit.                              User Key       Initials Effective Dates Name Provider Type Discipline    LEIDY 02/03/23 -  Cristian Hirsch, PT DPT Physical  Therapist PT                  PT Recommendation and Plan  Anticipated Discharge Disposition (PT): home with 24/7 care, home with home health  Plan of Care Reviewed With: patient  Progress: improving  Outcome Evaluation: PT tx completed. Pt in bed and agrees to therapy with encouragement. Cont on vapotherm. T/f to EOB with SBA. Pt switched to nonrebreather 15L for activity. Completed BLE AROM seated x15 reps. Amb 30' at a time with CGA/min x1. Unsteady with turns. Requires extensive seated rest break d/t increased work of  breathing and fatigue. O2 sats decrease to 80s briefly. Pt agreeable to sit up in chair. Will follow.       Time Calculation:    PT Charges       Row Name 09/23/24 1451             Time Calculation    Start Time 1310  -LY      Stop Time 1350  -LY      Time Calculation (min) 40 min  -LY      PT Received On 09/23/24  -LY         Time Calculation- PT    Total Timed Code Minutes- PT 40 minute(s)  -LY         Timed Charges    48106 - PT Therapeutic Exercise Minutes 15  -LY      41003 - Gait Training Minutes  25  -LY         Total Minutes    Timed Charges Total Minutes 40  -LY       Total Minutes 40  -LY                User Key  (r) = Recorded By, (t) = Taken By, (c) = Cosigned By      Initials Name Provider Type    LY Susanne Lizarraga PTA Physical Therapist Assistant                  Therapy Charges for Today       Code Description Service Date Service Provider Modifiers Qty    07266686508 HC PT THER PROC EA 15 MIN 9/23/2024 Susanne Lizarraga PTA GP 1    46866756060 HC GAIT TRAINING EA 15 MIN 9/23/2024 Susanne Lizarraga PTA GP 2            PT G-Codes  Outcome Measure Options: AM-PAC 6 Clicks Basic Mobility (PT)  AM-PAC 6 Clicks Score (PT): 17    Susanne Lizarraga PTA  9/23/2024

## 2024-09-24 LAB
ANION GAP SERPL CALCULATED.3IONS-SCNC: 11 MMOL/L (ref 5–15)
BACTERIA SPEC AEROBE CULT: NORMAL
BACTERIA SPEC AEROBE CULT: NORMAL
BUN SERPL-MCNC: 39 MG/DL (ref 8–23)
BUN/CREAT SERPL: 19.3 (ref 7–25)
CALCIUM SPEC-SCNC: 9.5 MG/DL (ref 8.6–10.5)
CHLORIDE SERPL-SCNC: 100 MMOL/L (ref 98–107)
CO2 SERPL-SCNC: 26 MMOL/L (ref 22–29)
CREAT SERPL-MCNC: 2.02 MG/DL (ref 0.76–1.27)
EGFRCR SERPLBLD CKD-EPI 2021: 32.3 ML/MIN/1.73
GLUCOSE SERPL-MCNC: 121 MG/DL (ref 65–99)
MAGNESIUM SERPL-MCNC: 1.9 MG/DL (ref 1.6–2.4)
POTASSIUM SERPL-SCNC: 4 MMOL/L (ref 3.5–5.2)
SODIUM SERPL-SCNC: 137 MMOL/L (ref 136–145)

## 2024-09-24 PROCEDURE — 99233 SBSQ HOSP IP/OBS HIGH 50: CPT | Performed by: INTERNAL MEDICINE

## 2024-09-24 PROCEDURE — 94799 UNLISTED PULMONARY SVC/PX: CPT

## 2024-09-24 PROCEDURE — 25010000002 CEFEPIME PER 500 MG: Performed by: INTERNAL MEDICINE

## 2024-09-24 PROCEDURE — 97530 THERAPEUTIC ACTIVITIES: CPT

## 2024-09-24 PROCEDURE — 97116 GAIT TRAINING THERAPY: CPT

## 2024-09-24 PROCEDURE — 83735 ASSAY OF MAGNESIUM: CPT | Performed by: INTERNAL MEDICINE

## 2024-09-24 PROCEDURE — 80048 BASIC METABOLIC PNL TOTAL CA: CPT | Performed by: INTERNAL MEDICINE

## 2024-09-24 PROCEDURE — 25010000002 BUMETANIDE PER 0.5 MG: Performed by: FAMILY MEDICINE

## 2024-09-24 PROCEDURE — 94761 N-INVAS EAR/PLS OXIMETRY MLT: CPT

## 2024-09-24 RX ORDER — CETIRIZINE HYDROCHLORIDE 10 MG/1
10 TABLET ORAL DAILY
Status: DISCONTINUED | OUTPATIENT
Start: 2024-09-24 | End: 2024-10-07 | Stop reason: HOSPADM

## 2024-09-24 RX ORDER — FLUTICASONE PROPIONATE 50 UG/1
2 SPRAY, METERED NASAL DAILY
Status: DISCONTINUED | OUTPATIENT
Start: 2024-09-24 | End: 2024-10-07 | Stop reason: HOSPADM

## 2024-09-24 RX ORDER — ALBUTEROL SULFATE 90 UG/1
2 INHALANT RESPIRATORY (INHALATION)
Status: DISCONTINUED | OUTPATIENT
Start: 2024-09-24 | End: 2024-09-29

## 2024-09-24 RX ADMIN — ISOSORBIDE MONONITRATE 30 MG: 30 TABLET, EXTENDED RELEASE ORAL at 12:53

## 2024-09-24 RX ADMIN — ALBUTEROL SULFATE 2 PUFF: 108 INHALANT RESPIRATORY (INHALATION) at 10:25

## 2024-09-24 RX ADMIN — CETIRIZINE HYDROCHLORIDE 10 MG: 10 TABLET ORAL at 12:53

## 2024-09-24 RX ADMIN — ATORVASTATIN CALCIUM 20 MG: 10 TABLET, FILM COATED ORAL at 21:32

## 2024-09-24 RX ADMIN — Medication 10 ML: at 21:33

## 2024-09-24 RX ADMIN — CEFEPIME 2000 MG: 2 INJECTION, POWDER, FOR SOLUTION INTRAVENOUS at 12:53

## 2024-09-24 RX ADMIN — SODIUM BICARBONATE 650 MG: 650 TABLET ORAL at 08:50

## 2024-09-24 RX ADMIN — Medication 10 ML: at 08:46

## 2024-09-24 RX ADMIN — FLUTICASONE PROPIONATE 2 SPRAY: 50 SPRAY, METERED NASAL at 12:53

## 2024-09-24 RX ADMIN — IPRATROPIUM BROMIDE 2 PUFF: 17 AEROSOL, METERED RESPIRATORY (INHALATION) at 10:25

## 2024-09-24 RX ADMIN — ALBUTEROL SULFATE 2 PUFF: 90 INHALANT RESPIRATORY (INHALATION) at 19:15

## 2024-09-24 RX ADMIN — ASPIRIN 81 MG: 81 TABLET, COATED ORAL at 08:43

## 2024-09-24 RX ADMIN — BUMETANIDE 1 MG: 0.25 INJECTION INTRAMUSCULAR; INTRAVENOUS at 08:33

## 2024-09-24 RX ADMIN — SODIUM BICARBONATE 650 MG: 650 TABLET ORAL at 21:33

## 2024-09-24 RX ADMIN — BUDESONIDE AND FORMOTEROL FUMARATE DIHYDRATE 2 PUFF: 160; 4.5 AEROSOL RESPIRATORY (INHALATION) at 19:15

## 2024-09-24 RX ADMIN — GUAIFENESIN 600 MG: 600 TABLET, EXTENDED RELEASE ORAL at 21:32

## 2024-09-24 RX ADMIN — ACETAMINOPHEN 1000 MG: 500 TABLET, FILM COATED ORAL at 21:32

## 2024-09-24 RX ADMIN — BUMETANIDE 1 MG: 0.25 INJECTION INTRAMUSCULAR; INTRAVENOUS at 17:03

## 2024-09-24 RX ADMIN — PANTOPRAZOLE SODIUM 40 MG: 40 TABLET, DELAYED RELEASE ORAL at 08:50

## 2024-09-24 RX ADMIN — TAMSULOSIN HYDROCHLORIDE 0.4 MG: 0.4 CAPSULE ORAL at 21:33

## 2024-09-24 RX ADMIN — Medication 10 MG: at 21:33

## 2024-09-24 RX ADMIN — SODIUM BICARBONATE 650 MG: 650 TABLET ORAL at 17:03

## 2024-09-24 RX ADMIN — IPRATROPIUM BROMIDE 2 PUFF: 17 AEROSOL, METERED RESPIRATORY (INHALATION) at 06:56

## 2024-09-24 RX ADMIN — BUDESONIDE AND FORMOTEROL FUMARATE DIHYDRATE 2 PUFF: 160; 4.5 AEROSOL RESPIRATORY (INHALATION) at 06:56

## 2024-09-24 RX ADMIN — ALBUTEROL SULFATE 2 PUFF: 108 INHALANT RESPIRATORY (INHALATION) at 06:56

## 2024-09-24 RX ADMIN — METOPROLOL SUCCINATE 25 MG: 25 TABLET, EXTENDED RELEASE ORAL at 08:43

## 2024-09-24 RX ADMIN — GUAIFENESIN 600 MG: 600 TABLET, EXTENDED RELEASE ORAL at 08:46

## 2024-09-24 RX ADMIN — EMPAGLIFLOZIN 10 MG: 10 TABLET, FILM COATED ORAL at 08:44

## 2024-09-24 NOTE — PROGRESS NOTES
[facility] [place]UL[place]ARY [place]GRESS [place] - [facility]    Patient: Karoline Prater  1940   MR# [id number]   Acct# [id number]  09/24/24   09:27 CDT  Referring Provider: Mychal Bagley MD    Chief Complaint: COVID with chronic respiratory failure  Interval history: He remains in COVID isolation on Vapotherm, 20 L 60%.  Nursing is at bedside getting a new IV started.  He had 1 episode of confusion last night but that is improved this morning.  He is alert and oriented but remains weak.  No new complaints.  Meds:  acetaminophen, 1,000 mg, Oral, Nightly  albuterol sulfate HFA, 2 puff, Inhalation, 4x Daily - RT   And  ipratropium, 2 puff, Inhalation, 4x Daily - RT  aspirin, 81 mg, Oral, Daily  atorvastatin, 20 mg, Oral, Nightly  budesonide-formoterol, 2 puff, Inhalation, BID - RT  bumetanide, 1 mg, Intravenous, Q12H  cefepime, 2,000 mg, Intravenous, Q24H  empagliflozin, 10 mg, Oral, Daily  guaiFENesin, 600 mg, Oral, Q12H  isosorbide mononitrate, 30 mg, Oral, Daily Before Lunch  melatonin, 10 mg, Oral, Nightly  metoprolol succinate XL, 25 mg, Oral, Daily  pantoprazole, 40 mg, Oral, Daily  sodium bicarbonate, 650 mg, Oral, TID  sodium chloride, 10 mL, Intravenous, Q12H  tamsulosin, 0.4 mg, Oral, Nightly           Physical Exam:  SpO2 Percentage    09/24/24 0545 09/24/24 0656 09/24/24 0844   SpO2: 95% 95% 96%     Body mass index is 26.57 kg/m².   Temp:  [97 °F (36.1 °C)-98.8 °F (37.1 °C)] 97 °F (36.1 °C)  Heart Rate:  [58-88] 78  Resp:  [16-20] 18  BP: (126-139)/(72-82) 126/82  Intake/Output Summary (Last 24 hours) at 9/24/2024 0927  Last data filed at 9/24/2024 0545  Gross per 24 hour   Intake 480 ml   Output 950 ml   Net -470 ml     Physical Exam  Vitals and nursing note reviewed.   Constitutional:       Interventions: Nasal cannula in place.      Comments: Vapotherm   HENT:      Head: Normocephalic.   Cardiovascular:      Rate and Rhythm: Normal rate and regular rhythm.      Comments: Paced  rhythm  Pulmonary:      Effort: Pulmonary effort is normal.   Neurological:      Mental Status: He is alert.      Motor: Weakness present.   Psychiatric:         Behavior: Behavior normal.     Electronically signed by MARITZA Hawthorne, 9/24/2024, 09:27 CDT      Physician Substantive Portion: Medical Decision Making to follow:     Physician substantive portion: medical decision making  Result Review  Laboratory Data:  Results from last 7 days   Lab Units 09/22/24  0404 09/21/24  0538 09/20/24  0433   WBC 10*3/mm3 7.22 7.39 8.09   HEMOGLOBIN g/dL 11.7* 11.4* 10.5*   PLATELETS 10*3/mm3 126* 135* 145     Results from last 7 days   Lab Units 09/24/24  0409 09/22/24  0404 09/21/24  0538 09/20/24  0433 09/19/24  1928   SODIUM mmol/L 137 137 139   < > 139   POTASSIUM mmol/L 4.0 4.1 3.9   < > 4.4   CO2 mmol/L 26.0 24.0 25.0   < > 26.0   BUN mg/dL 39* 38* 40*   < > 37*   CREATININE mg/dL 2.02* 1.91* 1.86*   < > 1.79*   LACTATE mmol/L  --   --   --   --  1.6    < > = values in this interval not displayed.         Microbiology Results (last 10 days)       Procedure Component Value - Date/Time    MRSA Screen, PCR (Inpatient) - Swab, Nares [[id number]]  (Normal) Collected: 09/21/24 1300    Lab Status: Final result Specimen: Swab from Nares Updated: 09/21/24 1431     MRSA PCR No MRSA Detected    Narrative:      The negative predictive value of this diagnostic test is high and should only be used to consider de-escalating anti-MRSA therapy. A positive result may indicate colonization with MRSA and must be correlated clinically.    Respiratory Culture - Sputum, Cough [[id number]] Collected: 09/21/24 0047    Lab Status: Final result Specimen: Sputum from Cough Updated: 09/23/24 1032     Respiratory Culture Scant growth (1+) Normal Respiratory Nya     Gram Stain Moderate (3+) WBCs per low power field      Rare (1+) Epithelial cells per low power field      Few (2+) Mixed gram positive nya    Blood Culture - Blood, Arm,  Left [[id number]]  (Normal) Collected: 09/19/24 2025    Lab Status: Preliminary result Specimen: Blood from Arm, Left Updated: 09/23/24 2100     Blood Culture No growth at 4 days    Respiratory Panel PCR w/COVID-19(SARS-CoV-2) JORDYN/MATILDE/BRYAN/PAD/COR/ASHER In-House, NP Swab in UTM/VTM, 2 HR TAT - Swab, Nasopharynx [[id number]]  (Abnormal) Collected: 09/19/24 1944    Lab Status: Final result Specimen: Swab from Nasopharynx Updated: 09/19/24 2113     ADENOVIRUS, PCR Not Detected     Coronavirus 229E Not Detected     Coronavirus HKU1 Not Detected     Coronavirus NL63 Not Detected     Coronavirus OC43 Not Detected     COVID19 Detected     Human Metapneumovirus Not Detected     Human Rhinovirus/Enterovirus Not Detected     Influenza A PCR Not Detected     Influenza B PCR Not Detected     Parainfluenza Virus 1 Not Detected     Parainfluenza Virus 2 Not Detected     Parainfluenza Virus 3 Not Detected     Parainfluenza Virus 4 Not Detected     RSV, PCR Not Detected     Bordetella pertussis pcr Not Detected     Bordetella parapertussis PCR Not Detected     Chlamydophila pneumoniae PCR Not Detected     Mycoplasma pneumo by PCR Not Detected    Narrative:      In the setting of a positive respiratory panel with a viral infection PLUS a negative procalcitonin without other underlying concern for bacterial infection, consider observing off antibiotics or discontinuation of antibiotics and continue supportive care. If the respiratory panel is positive for atypical bacterial infection (Bordetella pertussis, Chlamydophila pneumoniae, or Mycoplasma pneumoniae), consider antibiotic de-escalation to target atypical bacterial infection.    Urine Culture - Urine, Urine, Clean Catch [[id number]]  (Abnormal)  (Susceptibility) Collected: 09/19/24 1943    Lab Status: Final result Specimen: Urine, Clean Catch Updated: 09/22/24 1209     Urine Culture >100,000 CFU/mL Citrobacter amalonaticus    Narrative:      Colonization of the urinary tract without  infection is common. Treatment is discouraged unless the patient is symptomatic, pregnant, or undergoing an invasive urologic procedure.    Susceptibility        Citrobacter amalonaticus      BRANDIN      Amoxicillin + Clavulanate Resistant      Cefazolin Resistant      Cefepime Susceptible      Ceftazidime Susceptible      Ceftriaxone Susceptible      Gentamicin Susceptible      Levofloxacin Susceptible      Nitrofurantoin Intermediate      Piperacillin + Tazobactam Susceptible      Trimethoprim + Sulfamethoxazole Susceptible                           Blood Culture - Blood, Arm, Left [[id number]]  (Normal) Collected: 09/19/24 1928    Lab Status: Preliminary result Specimen: Blood from Arm, Left Updated: 09/23/24 2000     Blood Culture No growth at 4 days           Recent films:  No radiology results from the last 24 hrs   Personal review of imaging : CXR shows chest x-ray shows chronic interstitial lung disease and small pleural effusion.  No other new changes noted.      Pulmonary Assessment:    Acute on chronic hypoxic respiratory failure  Dependence on supplemental oxygen  Bilateral pulm infiltrate/post-COVID syndrome  Secondary bacterial pneumonia  History of interstitial lung disease  Chronic congestive diastolic heart failure with preserved EF.  Chronic obstructive pulmonary disease.  No carcinoma the right upper lobe of the lung with metastasis  Status post chemotherapy and radiation treatment  Allergic rhinitis    Recommend/plan:   Patient was seen in the [facility] isolation in medical floor today.    He was seen in [facility] isolation but his brother was in the room.  He was sitting up in the bed on oxygen  Chart reviewed current events noted.  He remains on Vapotherm 20 L flow and 60% FiO2.  He is anxious and told me he did not sleep last night due to air circulation noise.  He is currently getting cefepime and also getting Symbicort and albuterol HFA..  Monitor cultures and adjust antibiotics  He is also getting  Atrovent HFA.  He completed dexamethasone and already treated with COVID medications.  Continue titrating oxygen and keep saturation more than 92%.    Switch it to high flow cannula and home oxygen evaluation before discharge  He already had interstitial lung disease and pulmonary fibrosis and will need home oxygen evaluation before discharge and was on oxygen before.  DVT and stress ulcer prophylaxis.  Pain and anxiety control.  Nutritional support.  Repeat labs and x-rays from time to time.    Patient is known to me and I will follow him as an outpatient after discharge  I added him on Flonase and Zyrtec for nasal allergy.  CODE STATUS: Full..  Overall prognosis: Guarded.  We will continue following    Time spent by me: 35 min    This visit was performed by both a physician and an [facility] RN.  I performed all aspects of the medical decision making as documented.    Electronically signed by     Tarik Crocker MD,  Pulmonologist/Intensivist   9/24/2024, 12:00 CDT

## 2024-09-24 NOTE — PLAN OF CARE
Goal Outcome Evaluation:  Plan of Care Reviewed With: patient        Progress: improving  Outcome Evaluation: PT tx completed. Pt in bed and agrees to therapy. Pt reports he is feeling better now that it is more quiet in his room. Cont on vapotherm. 20L at 60%. SBA for bed mobility. Performs sit to stands with SBA/CGA. Amb x2 reps, 20' at a time with CGA. O2 sats decreased to 88% each time. 4-5 min of seated rest and pursed lip breathing to recover. Pt did not want to sit in chair because it is too difficult to use the urinal. Did agree to sit up EOB and have his meals. Will cont to follow.      Anticipated Discharge Disposition (PT): home with 24/7 care, home with home health

## 2024-09-24 NOTE — THERAPY TREATMENT NOTE
Acute Care - Physical Therapy Treatment Note  [facility]     Patient Name: Karoline Prater  : 1940  MRN: [id number]  Today's Date: 2024      Visit Dx:     ICD-10-CM ICD-9-CM   1. Hypoxia  R09.02 799.02   2. Pneumonitis  J98.4 486   3. Acute UTI  N39.0 599.0   4. Impaired mobility [Z74.09]  Z74.09 799.89     Patient Active Problem List   Diagnosis    Dyspnea    Essential hypertension    NSVT (nonsustained ventricular tachycardia)    Malignant neoplasm of upper lobe of right lung    Stage 3b chronic kidney disease    Pancytopenia due to antineoplastic chemotherapy    Pneumonia due to infectious organism    Function kidney decreased    Cellulitis    Acute on chronic respiratory failure with hypoxia    Pulmonary fibrosis    Macrocytic anemia    Thrombocytopenia    Sepsis    Right pneumothorax    Hyperkalemia    Acute kidney injury superimposed on CKD stage 3b    GERD without esophagitis    A-fib    Former smoker    Chest pain    Tachycardia    Bilateral lower extremity edema    Metastatic adenocarcinoma of the RUL    Chronic diastolic congestive heart failure    COPD (chronic obstructive pulmonary disease)    S/P radiation > 12 weeks    CHF (congestive heart failure)    History of radiation therapy    Chronic heart failure with preserved ejection fraction (HFpEF)    Narrow complex tachycardia    Atrial flutter    Encounter for examination for normal comparison and control in clinical research program    CHF exacerbation    COVID-19 virus infection    Cytokine release syndrome, grade 2    Pneumonia, unspecified organism    Hyperlipidemia    Coronary artery disease    Pneumonitis    Acute-on-chronic respiratory failure     Past Medical History:   Diagnosis Date    Arthritis     Atrial fibrillation with rapid ventricular response 2019    Blindness of left eye     BPH with obstruction/lower urinary tract symptoms     Cancer     stomach & lung    CHF (congestive heart failure)     CKD (chronic kidney  disease) stage 3, GFR 30-59 ml/min     COPD with acute exacerbation 08/03/2024    Coronary artery disease     Elevated cholesterol     Essential hypertension 10/02/2017    Fibrotic lung diseases     GERD (gastroesophageal reflux disease)     Hearing loss     History of transfusion     Hydronephrosis of left kidney     Hyperlipidemia     Hypertension     pt was taken off of all of his medications for BP (atenolol, lisinopril, lasix) because his BP kept bottoming out so his primary dr told him to discontinue them 1-2 months ago (Jan/Feb 2019). pt states he takes no medications currently.    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 Patel is just going to keep an eye on it and not do surgery right now.    Mediastinal adenopathy 10/24/2018    Station [facility]    Monoclonal gammopathy of unknown significance (MGUS) 09/11/2018    Pancreatic mass     pt states he had this in 2013 but it went away on its own. Now recent CT shows it has come back so he is going to have an ultrasound on 3/13/19.    Shortness of breath      Past Surgical History:   Procedure Laterality Date    ABDOMINAL SURGERY      BRONCHOSCOPY N/A 10/24/2018    Procedure: BRONCHOSCOPY WITH BIOPSY, EBUS;  Surgeon: Gareth Becerra MD;  Location: [facility] OR;  Service: Pulmonary    CHOLECYSTECTOMY      COLONOSCOPY N/A 1/3/2019    Procedure: COLONOSCOPY WITH ANESTHESIA;  Surgeon: Randy Somers DO;  Location: [facility] ENDOSCOPY;  Service: Gastroenterology    CYSTOSCOPY, RETROGRADE PYELOGRAM AND STENT INSERTION Left 3/8/2019    Procedure: CYSTOSCOPY RETROGRADE BILATERAL PYELOGRAM;  Surgeon: Jos Sylvester MD;  Location: [facility] OR;  Service: Urology    ENDOSCOPY N/A 12/11/2018    Procedure: ESOPHAGOGASTRODUODENOSCOPY WITH ANESTHESIA;  Surgeon: Randy Somers DO;  Location: [facility] ENDOSCOPY;  Service:  Gastroenterology    ENDOSCOPY N/A 4/29/2019    Procedure: ESOPHAGOGASTRODUODENOSCOPY WITH ANESTHESIA;  Surgeon: Lilliam Jj MD;  Location: [facility] OR;  Service: Gastroenterology    ENDOSCOPY N/A 5/9/2019    Procedure: ESOPHAGOGASTRODUODENOSCOPY WITH ANESTHESIA;  Surgeon: Pilo Bansal MD;  Location: [facility] ENDOSCOPY;  Service: Gastroenterology    EYE SURGERY Left 1964    FOOT SURGERY Right 1966    joint    FRACTURE SURGERY      PACEMAKER IMPLANTATION Left 8/29/2024    Procedure: Leadless pacemaker implant and AVN ablation;  Surgeon: Carlitos Bowen MD;  Location: [facility] CATH INVASIVE LOCATION;  Service: Cardiovascular;  Laterality: Left;     PT Assessment (Last 12 Hours)       PT Evaluation and Treatment       Row Name 09/24/24 1031          Physical Therapy Time and Intention    Subjective Information complains of;pain  -LY     Document Type therapy note (daily note)  -LY     Mode of Treatment physical therapy  -LY       Row Name 09/24/24 1031          General Information    Existing Precautions/Restrictions fall;oxygen therapy device and L/min  Vapo therm 20L 60%  -LY       Row Name 09/24/24 1031          Pain    Pretreatment Pain Rating 0/10 - no pain  -LY       Row Name 09/24/24 1031          Bed Mobility    Supine-Sit [place] (Bed Mobility) standby assist  -LY     Assistive Device (Bed Mobility) head of bed elevated;bed rails  -LY       Row Name 09/24/24 1031          Transfers    Transfers sit-stand transfer  -LY       Row Name 09/24/24 1031          Sit-Stand Transfer    Sit-Stand [place] (Transfers) standby assist;contact guard  -LY     Comment, (Sit-Stand Transfer) x5 total  -LY       Row Name 09/24/24 1031          Gait/Stairs (Locomotion)    [place] Level (Gait) verbal cues;contact guard  -LY     Distance in Feet (Gait) 20  x2 reps with seated rest break  -LY     Pattern (Gait) step-through  -LY     Deviations/Abnormal Patterns (Gait) gait speed decreased  -LY     Bilateral Gait  Deviations forward flexed posture;heel strike decreased  -LY     Comment, (Gait/Stairs) O2 sats decreased to 88% each time. 4-5 min of rest and pursed lip breathing to recover  -LY       Row Name 09/24/24 1031          Balance    Comment, Balance Sat EOB for 25 min with S. No LOB noted.  -LY       Row Name 09/24/24 1031          Motor Skills    Comments, Therapeutic Exercise BLE AROM seated x15 reps  -LY       Row Name 09/24/24 1031          Plan of Care Review    Plan of Care Reviewed With patient  -LY     Progress improving  -LY     Outcome Evaluation PT tx completed. Pt in bed and agrees to therapy. Pt reports he is feeling better now that it is more quiet in his room. Cont on vapotherm. 20L at 60%. SBA for bed mobility. Performs sit to stands with SBA/CGA. Amb x2 reps, 20' at a time with CGA. O2 sats decreased to 88% each time. 4-5 min of seated rest and pursed lip breathing to recover. Pt did not want to sit in chair because it is too difficult to use the urinal. Did agree to sit up EOB and have his meals. Will cont to follow.  -LY       Row Name 09/24/24 1031          Positioning and Restraints    Pre-Treatment Position in bed  -LY     Post Treatment Position bed  -LY     In Bed sitting EOB;call light within reach;encouraged to call for assist;with family/caregiver;notified [facility]  -LY               User Key  (r) = Recorded By, (t) = Taken By, (c) = Cosigned By      Initials Name Provider Type    LY Susanne Lizarraga, PTA Physical Therapist Assistant                    Physical Therapy Education       Title: PT OT SLP Therapies (In Progress)       Topic: Physical Therapy (In Progress)       Point: Mobility training (Done)       Learning Progress Summary             Patient Acceptance, E,TB, VU by STEVEN at 9/24/2024 0745    Acceptance, E, VU,DU,NR by LEIDY at 9/20/2024 1400    Comment: benefits of activity, progression of PT POC                         Point: Home exercise program (Not Started)       Learner Progress:  Not  documented in this visit.              Point: Body mechanics (Not Started)       Learner Progress:  Not documented in this visit.              Point: Precautions (Not Started)       Learner Progress:  Not documented in this visit.                              User Key       Initials Effective Dates Name Provider Type Discipline    LEIDY 02/03/23 -  Cristian Hirsch PT DPT Physical Therapist PT     06/19/24 -  Rosa Aiken, RN Registered Nurse Nurse                  PT Recommendation and Plan  Anticipated Discharge Disposition (PT): home with 24/7 care, home with home health  Plan of Care Reviewed With: patient  Progress: improving  Outcome Evaluation: PT tx completed. Pt in bed and agrees to therapy. Pt reports he is feeling better now that it is more quiet in his room. Cont on vapotherm. 20L at 60%. SBA for bed mobility. Performs sit to stands with SBA/CGA. Amb x2 reps, 20' at a time with CGA. O2 sats decreased to 88% each time. 4-5 min of seated rest and pursed lip breathing to recover. Pt did not want to sit in chair because it is too difficult to use the urinal. Did agree to sit up EOB and have his meals. Will cont to follow.       Time Calculation:    PT Charges       Row Name 09/24/24 1133             Time Calculation    Start Time 1031  -LY      Stop Time 1110  -LY      Time Calculation (min) 39 min  -LY      PT Received On 09/24/24  -LY         Time Calculation- PT    Total Timed Code Minutes- PT 39 minute(s)  -LY         Timed Charges    67172 - Gait Training Minutes  25  -LY      07795 - PT Therapeutic Activity Minutes 14  -LY         Total Minutes    Timed Charges Total Minutes 39  -LY       Total Minutes 39  -LY                User Key  (r) = Recorded By, (t) = Taken By, (c) = Cosigned By      Initials Name Provider Type    LY Susanne Lizarraga, PTA Physical Therapist Assistant                  Therapy Charges for Today       Code Description Service Date Service Provider Modifiers Qty    68554993781  HC PT THER PROC EA 15 MIN 9/23/2024 Susanne Lizarraga, PTA GP 1    11002338057 HC GAIT TRAINING EA 15 MIN 9/23/2024 Susanne Lizarraga, PTA GP 2    28164908419 HC GAIT TRAINING EA 15 MIN 9/24/2024 Susanne Lizarraga, PTA GP 2    31955987239 HC PT THERAPEUTIC ACT EA 15 MIN 9/24/2024 Susanne Lizarraga, PTA GP 1            PT G-Codes  Outcome Measure Options: AM-PAC 6 Clicks Basic Mobility (PT)  AM-PAC 6 Clicks Score (PT): 17    Susanne Lizarraga PTA  9/24/2024

## 2024-09-24 NOTE — PLAN OF CARE
Goal Outcome Evaluation:  Plan of Care Reviewed With: patient        Progress: improving   Pt slept well during shift. Pt did not have any complaints of pain. Pt had a intermittent moment of confusion during shift where pt was disoriented to place and situation. Pt pulled off tele, clothing, oxygen, and IV. Once oxygen was replaced pt was able to answer orientation questions correctly. Camera and monitor was set up to monitor pt closely and no other incidents were had. VAT consult ordered for lost IV access. RN and Charge nurse tried. Pt ran sinus aurea 56-57 with an episode of A flutter at 62. Precautions maintained during shift. Safety was maintained during shift. Call light within reach.

## 2024-09-24 NOTE — PROGRESS NOTES
[facility] Medicine Services  INPATIENT PROGRESS NOTE    Patient Name: Karoline Prater  Date of Admission: 9/19/2024  Today's Date: 09/24/24  Length of Stay: 4  Primary Care Physician: Irving Alexis MD    Subjective   Chief Complaint: acute on chronic respiratory failure with hypoxia  Shortness of Breath    Today  Patient said he slept well last night about had an abnormal behavior overnight and suspected the new anxiety medication could have contributed to that.      Review of Systems   Respiratory:  Positive for shortness of breath.       All pertinent negatives and positives are as above. All other systems have been reviewed and are negative unless otherwise stated.     Objective    Temp:  [96.9 °F (36.1 °C)-98.8 °F (37.1 °C)] 96.9 °F (36.1 °C)  Heart Rate:  [58-84] 76  Resp:  [16-20] 18  BP: (121-139)/(76-82) 121/76  Physical Exam  GEN: Awake, alert, interactive, in NAD, appears frail/chronically ill  HEENT: L eye blind, R eye normal motion/pupillary reflex. Anicteric  Lungs: diminished bs, equal chest rist, normal respiratory effort  Heart: +S1/s2, no rub  ABD: soft, nt/nd, +BS, no guarding/rebound  Extremities: atraumatic, no cyanosis, trace b/l LE edema  Skin: no rashes or petechiae  Neuro: AAOx3, no obvious focal deficits  Psych: normal mood & affect        Results Review:  I have reviewed the labs, radiology results, and diagnostic studies.    Laboratory Data:   Results from last 7 days   Lab Units 09/22/24  0404 09/21/24  0538 09/20/24  0433   WBC 10*3/mm3 7.22 7.39 8.09   HEMOGLOBIN g/dL 11.7* 11.4* 10.5*   HEMATOCRIT % 37.4* 36.9* 34.1*   PLATELETS 10*3/mm3 126* 135* 145        Results from last 7 days   Lab Units 09/24/24  0409 09/22/24  0404 09/21/24  0538 09/20/24  0433 09/19/24  1928 09/18/24  1051   SODIUM mmol/L 137 137 139   < > 139 139   POTASSIUM mmol/L 4.0 4.1 3.9   < > 4.4 4.4   CHLORIDE mmol/L 100 103 104   < > 103 103   TOTAL CO2 mmol/L  --    --   --   --   --  25   CO2 mmol/L 26.0 24.0 25.0   < > 26.0  --    BUN mg/dL 39* 38* 40*   < > 37* 38*   CREATININE mg/dL 2.02* 1.91* 1.86*   < > 1.79* 1.3*   CALCIUM mg/dL 9.5 9.5 9.5   < > 9.8 10.3*   BILIRUBIN mg/dL  --   --  0.4  --  0.6 0.5   ALK PHOS U/L  --   --  80  --  100 102   ALT (SGPT) U/L  --   --  34  --  46* 41   AST (SGOT) U/L  --   --  31  --  41* 32   GLUCOSE mg/dL 121* 111* 111*   < > 113* 117*    < > = values in this interval not displayed.       Culture Data:   Blood Culture   Date Value Ref Range Status   09/19/2024 No growth at 24 hours  Preliminary   09/19/2024 No growth at 24 hours  Preliminary       Radiology Data:   Imaging Results (Last 24 Hours)       ** No results found for the last 24 hours. **            I have reviewed the patient's current medications.     Assessment/Plan   Assessment  Active Hospital Problems    Diagnosis     **Acute on chronic respiratory failure with hypoxia     Acute-on-chronic respiratory failure     Pneumonitis     COVID-19 virus infection     Chronic heart failure with preserved ejection fraction (HFpEF)     COPD (chronic obstructive pulmonary disease)     A-fib     Pulmonary fibrosis     Stage 3b chronic kidney disease     Malignant neoplasm of upper lobe of right lung     Essential hypertension        Treatment Plan  #Acute on chronic respiratory failure with hypoxia -patient with worsening baseline hypoxia.  Underlying ILD.  Recent COVID.  Concern for possible secondary bacterial pneumonia.  Elevated Pro-Skinny  Pulmonologist is on consult and helping with management, follow recommendations  Patient's oxygen requirement improved this morning, currently needing 20 L.    # Probable hospital-associated pneumonia [recent COVID-19 infection]  CT of the chest showed advanced chronic lung disease with suspected pneumonia.  Pulmonologist reviewed patient and started empiric antibiotic coverage with cefepime [vancomycin discontinued], will follow workup and  pulmonologist recommendations.    # Right upper lobe mass on CT  CT showed the following-  Spiculated masslike opacity of the right upper lobe, similar to only  minimally decreased compared to 8/3/2024, increased compared to  6/29/2024. Neoplastic process must be considered.  Discussed with pulmonologist and he recommended palliative/hospice evaluation.    #Acute on chronic preserved EF CHF -hard to say fully.  Patient is diuresing well.  Likely some slight fluid overload.  Continue to monitor diuretic response.  Continue Toprol-XL, Jardiance.  Not on additional goal-directed regimen due to renal function.    #CKD 3B -seems to be around baseline.  Monitor closely with diuretics and meds.    #COVID-19 -initially positive early September.  Still positive.  Suspect this is less likely acutely driving current illness    #Essential hypertension -BP stable on Toprol and Imdur    #COPD/Pulm fibrosis -supportive care    Medical Decision Making  Number and Complexity of problems: 1 acute, 2 acute on chronic, multiple chronic  Differential Diagnosis: as above    Conditions and Status        Clinically about the same as when I saw him yesterday, not at goal     MDM Data  External documents reviewed: none  Cardiac tracing (EKG, telemetry) interpretation: paced  Radiology interpretation: reports reviewed  Labs reviewed: as above  Any tests that were considered but not ordered: none     Decision rules/scores evaluated (example [id number], Wells, etc): none      Care Planning  Shared decision making: Patient apprised of current labs, vitals, imaging and treatment plan.  They are agreeable with proceeding with plans as discussed.    Code status and discussions: full code    Disposition  Social Determinants of Health that impact treatment or disposition: none  I expect the patient to be discharge when appropriate, several days yet, f/u therapy notes    Electronically signed by Mychal Bagley MD, 09/24/24, 15:05 CDT.

## 2024-09-24 NOTE — PLAN OF CARE
Goal Outcome Evaluation:  Plan of Care Reviewed With: patient        Progress: improving  Outcome Evaluation: Patient did not c/o pain this shift. Still c/o soa with exertion but improving. Now on high flow at 6L, with saturation above 93%. PT/OT working with patient. Continue current tx and monitoring pt.

## 2024-09-25 PROCEDURE — 97530 THERAPEUTIC ACTIVITIES: CPT

## 2024-09-25 PROCEDURE — 97110 THERAPEUTIC EXERCISES: CPT

## 2024-09-25 PROCEDURE — 94664 DEMO&/EVAL PT USE INHALER: CPT

## 2024-09-25 PROCEDURE — 25010000002 CEFEPIME PER 500 MG: Performed by: INTERNAL MEDICINE

## 2024-09-25 PROCEDURE — 99233 SBSQ HOSP IP/OBS HIGH 50: CPT | Performed by: INTERNAL MEDICINE

## 2024-09-25 PROCEDURE — 94761 N-INVAS EAR/PLS OXIMETRY MLT: CPT

## 2024-09-25 PROCEDURE — 97116 GAIT TRAINING THERAPY: CPT

## 2024-09-25 PROCEDURE — 94799 UNLISTED PULMONARY SVC/PX: CPT

## 2024-09-25 PROCEDURE — 25010000002 BUMETANIDE PER 0.5 MG: Performed by: FAMILY MEDICINE

## 2024-09-25 RX ORDER — TRAZODONE HYDROCHLORIDE 50 MG/1
25 TABLET, FILM COATED ORAL NIGHTLY PRN
Status: DISCONTINUED | OUTPATIENT
Start: 2024-09-25 | End: 2024-10-07 | Stop reason: HOSPADM

## 2024-09-25 RX ADMIN — SODIUM BICARBONATE 650 MG: 650 TABLET ORAL at 15:17

## 2024-09-25 RX ADMIN — EMPAGLIFLOZIN 10 MG: 10 TABLET, FILM COATED ORAL at 10:15

## 2024-09-25 RX ADMIN — BUMETANIDE 1 MG: 0.25 INJECTION INTRAMUSCULAR; INTRAVENOUS at 05:51

## 2024-09-25 RX ADMIN — ATORVASTATIN CALCIUM 20 MG: 10 TABLET, FILM COATED ORAL at 20:44

## 2024-09-25 RX ADMIN — METOPROLOL SUCCINATE 25 MG: 25 TABLET, EXTENDED RELEASE ORAL at 10:15

## 2024-09-25 RX ADMIN — ACETAMINOPHEN 1000 MG: 500 TABLET, FILM COATED ORAL at 20:44

## 2024-09-25 RX ADMIN — SODIUM BICARBONATE 650 MG: 650 TABLET ORAL at 10:15

## 2024-09-25 RX ADMIN — ALBUTEROL SULFATE 2 PUFF: 90 INHALANT RESPIRATORY (INHALATION) at 14:36

## 2024-09-25 RX ADMIN — ISOSORBIDE MONONITRATE 30 MG: 30 TABLET, EXTENDED RELEASE ORAL at 10:30

## 2024-09-25 RX ADMIN — ASPIRIN 81 MG: 81 TABLET, COATED ORAL at 10:15

## 2024-09-25 RX ADMIN — FLUTICASONE PROPIONATE 2 SPRAY: 50 SPRAY, METERED NASAL at 10:15

## 2024-09-25 RX ADMIN — SODIUM BICARBONATE 650 MG: 650 TABLET ORAL at 20:43

## 2024-09-25 RX ADMIN — Medication 10 ML: at 20:45

## 2024-09-25 RX ADMIN — Medication 10 ML: at 10:16

## 2024-09-25 RX ADMIN — Medication 10 MG: at 20:45

## 2024-09-25 RX ADMIN — CEFEPIME 2000 MG: 2 INJECTION, POWDER, FOR SOLUTION INTRAVENOUS at 15:17

## 2024-09-25 RX ADMIN — TAMSULOSIN HYDROCHLORIDE 0.4 MG: 0.4 CAPSULE ORAL at 20:44

## 2024-09-25 RX ADMIN — GUAIFENESIN 600 MG: 600 TABLET, EXTENDED RELEASE ORAL at 20:44

## 2024-09-25 RX ADMIN — PANTOPRAZOLE SODIUM 40 MG: 40 TABLET, DELAYED RELEASE ORAL at 10:15

## 2024-09-25 RX ADMIN — GUAIFENESIN 600 MG: 600 TABLET, EXTENDED RELEASE ORAL at 10:15

## 2024-09-25 RX ADMIN — ALBUTEROL SULFATE 2 PUFF: 90 INHALANT RESPIRATORY (INHALATION) at 06:44

## 2024-09-25 RX ADMIN — CETIRIZINE HYDROCHLORIDE 10 MG: 10 TABLET ORAL at 10:15

## 2024-09-25 RX ADMIN — ALBUTEROL SULFATE 2 PUFF: 90 INHALANT RESPIRATORY (INHALATION) at 19:45

## 2024-09-25 RX ADMIN — BUDESONIDE AND FORMOTEROL FUMARATE DIHYDRATE 2 PUFF: 160; 4.5 AEROSOL RESPIRATORY (INHALATION) at 19:46

## 2024-09-25 RX ADMIN — BUDESONIDE AND FORMOTEROL FUMARATE DIHYDRATE 2 PUFF: 160; 4.5 AEROSOL RESPIRATORY (INHALATION) at 06:43

## 2024-09-25 RX ADMIN — BUMETANIDE 1 MG: 0.25 INJECTION INTRAMUSCULAR; INTRAVENOUS at 18:28

## 2024-09-25 RX ADMIN — TRAZODONE HYDROCHLORIDE 25 MG: 50 TABLET ORAL at 20:43

## 2024-09-25 NOTE — PLAN OF CARE
Goal Outcome Evaluation:  Plan of Care Reviewed With: patient           Outcome Evaluation: AOX4 remains on 6L [place] c/o not sleeping at all last night has slept some between care this shift new order for prn med to assist w/sleep states overall that he feels better aside from being tired conts IV ABX had visit from [facility]

## 2024-09-25 NOTE — PLAN OF CARE
Goal Outcome Evaluation:  Plan of Care Reviewed With: patient        Progress: improving  Outcome Evaluation: PT tx completed. Pt now on 6L [place]. O2 sats decreased to 83% for supine to sit, unable to recover, increased to 8L and recovered in 1-2 min. Pt amb 25' with CGA, cues for pursed lip breathing. O2 sats once again decreased to 83%. Pt required 4-5 min of seated rest to recover. He completed BLE AROM and B scapular retraction while seated with frequent rest breaks. Pt returned to bed and O2 sats 79%, pt required several min of rest before recovering. Once 92%, O2 slowly lowered back to 6L. Will cont to follow.      Anticipated Discharge Disposition (PT): home with 24/7 care, home with home health

## 2024-09-25 NOTE — THERAPY TREATMENT NOTE
Acute Care - Physical Therapy Treatment Note  [facility]     Patient Name: Karoline Prater  : 1940  MRN: [id number]  Today's Date: 2024      Visit Dx:     ICD-10-CM ICD-9-CM   1. Hypoxia  R09.02 799.02   2. Pneumonitis  J98.4 486   3. Acute UTI  N39.0 599.0   4. Impaired mobility [Z74.09]  Z74.09 799.89     Patient Active Problem List   Diagnosis    Dyspnea    Essential hypertension    NSVT (nonsustained ventricular tachycardia)    Malignant neoplasm of upper lobe of right lung    Stage 3b chronic kidney disease    Pancytopenia due to antineoplastic chemotherapy    Pneumonia due to infectious organism    Function kidney decreased    Cellulitis    Acute on chronic respiratory failure with hypoxia    Pulmonary fibrosis    Macrocytic anemia    Thrombocytopenia    Sepsis    Right pneumothorax    Hyperkalemia    Acute kidney injury superimposed on CKD stage 3b    GERD without esophagitis    A-fib    Former smoker    Chest pain    Tachycardia    Bilateral lower extremity edema    Metastatic adenocarcinoma of the RUL    Chronic diastolic congestive heart failure    COPD (chronic obstructive pulmonary disease)    S/P radiation > 12 weeks    CHF (congestive heart failure)    History of radiation therapy    Chronic heart failure with preserved ejection fraction (HFpEF)    Narrow complex tachycardia    Atrial flutter    Encounter for examination for normal comparison and control in clinical research program    CHF exacerbation    COVID-19 virus infection    Cytokine release syndrome, grade 2    Pneumonia, unspecified organism    Hyperlipidemia    Coronary artery disease    Pneumonitis    Acute-on-chronic respiratory failure     Past Medical History:   Diagnosis Date    Arthritis     Atrial fibrillation with rapid ventricular response 2019    Blindness of left eye     BPH with obstruction/lower urinary tract symptoms     Cancer     stomach & lung    CHF (congestive heart failure)     CKD (chronic kidney  disease) stage 3, GFR 30-59 ml/min     COPD with acute exacerbation 08/03/2024    Coronary artery disease     Elevated cholesterol     Essential hypertension 10/02/2017    Fibrotic lung diseases     GERD (gastroesophageal reflux disease)     Hearing loss     History of transfusion     Hydronephrosis of left kidney     Hyperlipidemia     Hypertension     pt was taken off of all of his medications for BP (atenolol, lisinopril, lasix) because his BP kept bottoming out so his primary dr told him to discontinue them 1-2 months ago (Jan/Feb 2019). pt states he takes no medications currently.    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 Patel is just going to keep an eye on it and not do surgery right now.    Mediastinal adenopathy 10/24/2018    Station [facility]    Monoclonal gammopathy of unknown significance (MGUS) 09/11/2018    Pancreatic mass     pt states he had this in 2013 but it went away on its own. Now recent CT shows it has come back so he is going to have an ultrasound on 3/13/19.    Shortness of breath      Past Surgical History:   Procedure Laterality Date    ABDOMINAL SURGERY      BRONCHOSCOPY N/A 10/24/2018    Procedure: BRONCHOSCOPY WITH BIOPSY, EBUS;  Surgeon: Gareth Becerra MD;  Location: [facility] OR;  Service: Pulmonary    CHOLECYSTECTOMY      COLONOSCOPY N/A 1/3/2019    Procedure: COLONOSCOPY WITH ANESTHESIA;  Surgeon: Randy Somers DO;  Location: [facility] ENDOSCOPY;  Service: Gastroenterology    CYSTOSCOPY, RETROGRADE PYELOGRAM AND STENT INSERTION Left 3/8/2019    Procedure: CYSTOSCOPY RETROGRADE BILATERAL PYELOGRAM;  Surgeon: Jos Sylvester MD;  Location: [facility] OR;  Service: Urology    ENDOSCOPY N/A 12/11/2018    Procedure: ESOPHAGOGASTRODUODENOSCOPY WITH ANESTHESIA;  Surgeon: Randy Somers DO;  Location: [facility] ENDOSCOPY;  Service:  Gastroenterology    ENDOSCOPY N/A 4/29/2019    Procedure: ESOPHAGOGASTRODUODENOSCOPY WITH ANESTHESIA;  Surgeon: Lilliam Jj MD;  Location: [facility] OR;  Service: Gastroenterology    ENDOSCOPY N/A 5/9/2019    Procedure: ESOPHAGOGASTRODUODENOSCOPY WITH ANESTHESIA;  Surgeon: Pilo Bansal MD;  Location: [facility] ENDOSCOPY;  Service: Gastroenterology    EYE SURGERY Left 1964    FOOT SURGERY Right 1966    joint    FRACTURE SURGERY      PACEMAKER IMPLANTATION Left 8/29/2024    Procedure: Leadless pacemaker implant and AVN ablation;  Surgeon: Carlitos Bowen MD;  Location: [facility] CATH INVASIVE LOCATION;  Service: Cardiovascular;  Laterality: Left;     PT Assessment (Last 12 Hours)       PT Evaluation and Treatment       Row Name 09/25/24 1025 09/25/24 0907       Physical Therapy Time and Intention    Subjective Information complains of;fatigue;dyspnea  -LY --    Document Type therapy note (daily note)  -LY therapy note (daily note)  -LY    Mode of Treatment physical therapy  -LY physical therapy  -LY    Comment, Session Not Performed -- pt sleeping will check back  -LY      Row Name 09/25/24 1025          General Information    Existing Precautions/Restrictions fall;oxygen therapy device and L/min  6L hiflow, increased to 8L for activity  -LY       Row Name 09/25/24 1025          Pain    Pretreatment Pain Rating 0/10 - no pain  -LY       Row Name 09/25/24 1025          Bed Mobility    Supine-Sit [place] (Bed Mobility) standby assist  -LY     Sit-Supine [place] (Bed Mobility) standby assist  -LY     Assistive Device (Bed Mobility) head of bed elevated;bed rails  -LY     Comment, (Bed Mobility) O2 sats decrease with bed mobility, decreased to 79% for sit to supine  -LY       Row Name 09/25/24 1025          Sit-Stand Transfer    Sit-Stand [place] (Transfers) standby assist;contact guard  -LY       Row Name 09/25/24 1025          Gait/Stairs (Locomotion)    [place] Level (Gait) verbal cues;contact guard   -LY     Distance in Feet (Gait) 25  -LY     Pattern (Gait) step-through  -LY     Deviations/Abnormal Patterns (Gait) gait speed decreased  -LY     Bilateral Gait Deviations forward flexed posture;heel strike decreased  -LY     Comment, (Gait/Stairs) O2 sats decreased to 83% with ambulation, required 4-5 min of seated rest to recover to 90%  -LY       Row Name 09/25/24 1025          Motor Skills    Comments, Therapeutic Exercise BLE AROM x20 reps seated and B scapular retraction x10  -LY       Row Name 09/25/24 1025          Plan of Care Review    Plan of Care Reviewed With patient  -LY     Progress improving  -LY     Outcome Evaluation PT tx completed. Pt now on 6L hiflow. O2 sats decreased to 83% for supine to sit, unable to recover, increased to 8L and recovered in 1-2 min. Pt amb 25' with CGA, cues for pursed lip breathing. O2 sats once again decreased to 83%. Pt required 4-5 min of seated rest to recover. He completed BLE AROM and B scapular retraction while seated with frequent rest breaks. Pt returned to bed and O2 sats 79%, pt required several min of rest before recovering. Once 92%, O2 slowly lowered back to 6L. Will cont to follow.  -LY       Row Name 09/25/24 1025          Positioning and Restraints    Pre-Treatment Position in bed  -LY     Post Treatment Position bed  -LY     In Bed fowlers;call light within reach;encouraged to call for assist;with family/caregiver  -LY               User Key  (r) = Recorded By, (t) = Taken By, (c) = Cosigned By      Initials Name Provider Type    LY Susanne Lizarraga, PTA Physical Therapist Assistant                    Physical Therapy Education       Title: PT OT SLP Therapies (In Progress)       Topic: Physical Therapy (In Progress)       Point: Mobility training (Done)       Learning Progress Summary             Patient Acceptance, E,TB, VU by STEVEN at 9/25/2024 0755    Acceptance, E,TB, VU by STEVEN at 9/24/2024 0745    Acceptance, E, VU,DU,NR by LEIDY at 9/20/2024 1400     Comment: benefits of activity, progression of PT POC                         Point: Home exercise program (Not Started)       Learner Progress:  Not documented in this visit.              Point: Body mechanics (Not Started)       Learner Progress:  Not documented in this visit.              Point: Precautions (Not Started)       Learner Progress:  Not documented in this visit.                              User Key       Initials Effective Dates Name Provider Type Discipline    LEIDY 02/03/23 -  Cristian Hirsch, PT DPT Physical Therapist PT     06/19/24 -  Rosa Aiken, RN Registered Nurse Nurse                  PT Recommendation and Plan  Anticipated Discharge Disposition (PT): home with 24/7 care, home with home health  Plan of Care Reviewed With: patient  Progress: improving  Outcome Evaluation: PT tx completed. Pt now on 6L hiflow. O2 sats decreased to 83% for supine to sit, unable to recover, increased to 8L and recovered in 1-2 min. Pt amb 25' with CGA, cues for pursed lip breathing. O2 sats once again decreased to 83%. Pt required 4-5 min of seated rest to recover. He completed BLE AROM and B scapular retraction while seated with frequent rest breaks. Pt returned to bed and O2 sats 79%, pt required several min of rest before recovering. Once 92%, O2 slowly lowered back to 6L. Will cont to follow.       Time Calculation:    PT Charges       Row Name 09/25/24 1123             Time Calculation    Start Time 1020  -LY      Stop Time 1100  -LY      Time Calculation (min) 40 min  -LY      PT Received On 09/25/24  -LY         Time Calculation- PT    Total Timed Code Minutes- PT 40 minute(s)  -LY         Timed Charges    81672 - PT Therapeutic Exercise Minutes 15  -LY      36622 - Gait Training Minutes  10  -LY      32704 - PT Therapeutic Activity Minutes 15  -LY         Total Minutes    Timed Charges Total Minutes 40  -LY       Total Minutes 40  -LY                User Key  (r) = Recorded By, (t) = Taken By,  (c) = Cosigned By      Initials Name Provider Type    LY Susanne Lizarraga, PTA Physical Therapist Assistant                  Therapy Charges for Today       Code Description Service Date Service Provider Modifiers Qty    80336130334 HC GAIT TRAINING EA 15 MIN 9/24/2024 Susanne Lizarraga, PTA GP 2    56514897808 HC PT THERAPEUTIC ACT EA 15 MIN 9/24/2024 Susanne Lizarraga, PTA GP 1    89259572713 HC PT THER PROC EA 15 MIN 9/25/2024 Susanne Lizarraga, PTA GP 1    87728546015 HC GAIT TRAINING EA 15 MIN 9/25/2024 Susanne Lizarraga, PTA GP 1    18671915433 HC PT THERAPEUTIC ACT EA 15 MIN 9/25/2024 Susanne Lizarraga, PTA GP 1            PT G-Codes  Outcome Measure Options: AM-PAC 6 Clicks Basic Mobility (PT)  AM-PAC 6 Clicks Score (PT): 18    Susanne Lizarraga PTA  9/25/2024

## 2024-09-25 NOTE — PROGRESS NOTES
[facility] [place] - [facility]    Patient: Karoline Prater  1940   MR# [id number]   Acct# [id number]  09/25/24   08:57 CDT  Referring Provider: Mychal Bagley MD    Chief Complaint: COVID with chronic respiratory failure  Interval history: He remains in COVID isolation. He is seen awake and alert resting in bed. Staff at bedside. He tells me he is feeling better today. Oxygen saturation stable on 6l high flow cannula. Cefepime continues. Kidney function slowing improving. Afebrile. Recommendations per Dr. Crocker to follow.   Meds:  acetaminophen, 1,000 mg, Oral, Nightly  albuterol sulfate HFA, 2 puff, Inhalation, TID - RT   And  ipratropium, 2 puff, Inhalation, TID - RT  aspirin, 81 mg, Oral, Daily  atorvastatin, 20 mg, Oral, Nightly  budesonide-formoterol, 2 puff, Inhalation, BID - RT  bumetanide, 1 mg, Intravenous, Q12H  cefepime, 2,000 mg, Intravenous, Q24H  cetirizine, 10 mg, Oral, Daily  empagliflozin, 10 mg, Oral, Daily  fluticasone, 2 spray, Each Nare, Daily  guaiFENesin, 600 mg, Oral, Q12H  isosorbide mononitrate, 30 mg, Oral, Daily Before Lunch  melatonin, 10 mg, Oral, Nightly  metoprolol succinate XL, 25 mg, Oral, Daily  pantoprazole, 40 mg, Oral, Daily  sodium bicarbonate, 650 mg, Oral, TID  sodium chloride, 10 mL, Intravenous, Q12H  tamsulosin, 0.4 mg, Oral, Nightly           Physical Exam:  SpO2 Percentage    09/25/24 0353 09/25/24 0644 09/25/24 0823   SpO2: 91% 94% 92%     Body mass index is 26.54 kg/m².   Temp:  [96.4 °F (35.8 °C)-97.9 °F (36.6 °C)] 96.9 °F (36.1 °C)  Heart Rate:  [75-89] 81  Resp:  [16-18] 18  BP: (104-154)/(62-82) 121/76  Intake/Output Summary (Last 24 hours) at 9/25/2024 0857  Last data filed at 9/25/2024 0823  Gross per 24 hour   Intake 640 ml   Output 2250 ml   Net -1610 ml     Physical Exam  Vitals and nursing note reviewed.   Constitutional:       Interventions: Nasal cannula in place.      Comments: 6l   HENT:      Head:  Normocephalic.   Cardiovascular:      Rate and Rhythm: Normal rate and regular rhythm.      Comments: Paced rhythm  Pulmonary:      Effort: Pulmonary effort is normal.   Neurological:      Mental Status: He is alert.      Motor: Weakness present.   Psychiatric:         Behavior: Behavior normal.     Electronically signed by MARITZA Walters, 9/25/2024, 08:57 CDT      Physician substantive portion: medical decision making  Result Review  Laboratory Data:  Results from last 7 days   Lab Units 09/22/24  0404 09/21/24  0538 09/20/24  0433   WBC 10*3/mm3 7.22 7.39 8.09   HEMOGLOBIN g/dL 11.7* 11.4* 10.5*   PLATELETS 10*3/mm3 126* 135* 145     Results from last 7 days   Lab Units 09/24/24  0409 09/22/24  0404 09/21/24  0538 09/20/24  0433 09/19/24  1928   SODIUM mmol/L 137 137 139   < > 139   POTASSIUM mmol/L 4.0 4.1 3.9   < > 4.4   CO2 mmol/L 26.0 24.0 25.0   < > 26.0   BUN mg/dL 39* 38* 40*   < > 37*   CREATININE mg/dL 2.02* 1.91* 1.86*   < > 1.79*   LACTATE mmol/L  --   --   --   --  1.6    < > = values in this interval not displayed.         Microbiology Results (last 10 days)       Procedure Component Value - Date/Time    MRSA Screen, PCR (Inpatient) - Swab, Nares [[id number]]  (Normal) Collected: 09/21/24 1300    Lab Status: Final result Specimen: Swab from Nares Updated: 09/21/24 1431     MRSA PCR No MRSA Detected    Narrative:      The negative predictive value of this diagnostic test is high and should only be used to consider de-escalating anti-MRSA therapy. A positive result may indicate colonization with MRSA and must be correlated clinically.    Respiratory Culture - Sputum, Cough [[id number]] Collected: 09/21/24 0047    Lab Status: Final result Specimen: Sputum from Cough Updated: 09/23/24 1032     Respiratory Culture Scant growth (1+) Normal Respiratory Nya     Gram Stain Moderate (3+) WBCs per low power field      Rare (1+) Epithelial cells per low power field      Few (2+) Mixed gram positive nya     Blood Culture - Blood, Arm, Left [[id number]]  (Normal) Collected: 09/19/24 2025    Lab Status: Final result Specimen: Blood from Arm, Left Updated: 09/24/24 2100     Blood Culture No growth at 5 days    Respiratory Panel PCR w/COVID-19(SARS-CoV-2) JORDYN/MATILDE/BRYAN/PAD/COR/ASHER In-House, NP Swab in UTM/VTM, 2 HR TAT - Swab, Nasopharynx [[id number]]  (Abnormal) Collected: 09/19/24 1944    Lab Status: Final result Specimen: Swab from Nasopharynx Updated: 09/19/24 2113     ADENOVIRUS, PCR Not Detected     Coronavirus 229E Not Detected     Coronavirus HKU1 Not Detected     Coronavirus NL63 Not Detected     Coronavirus OC43 Not Detected     COVID19 Detected     Human Metapneumovirus Not Detected     Human Rhinovirus/Enterovirus Not Detected     Influenza A PCR Not Detected     Influenza B PCR Not Detected     Parainfluenza Virus 1 Not Detected     Parainfluenza Virus 2 Not Detected     Parainfluenza Virus 3 Not Detected     Parainfluenza Virus 4 Not Detected     RSV, PCR Not Detected     Bordetella pertussis pcr Not Detected     Bordetella parapertussis PCR Not Detected     Chlamydophila pneumoniae PCR Not Detected     Mycoplasma pneumo by PCR Not Detected    Narrative:      In the setting of a positive respiratory panel with a viral infection PLUS a negative procalcitonin without other underlying concern for bacterial infection, consider observing off antibiotics or discontinuation of antibiotics and continue supportive care. If the respiratory panel is positive for atypical bacterial infection (Bordetella pertussis, Chlamydophila pneumoniae, or Mycoplasma pneumoniae), consider antibiotic de-escalation to target atypical bacterial infection.    Urine Culture - Urine, Urine, Clean Catch [[id number]]  (Abnormal)  (Susceptibility) Collected: 09/19/24 1943    Lab Status: Final result Specimen: Urine, Clean Catch Updated: 09/22/24 1209     Urine Culture >100,000 CFU/mL Citrobacter amalonaticus    Narrative:      Colonization of the  urinary tract without infection is common. Treatment is discouraged unless the patient is symptomatic, pregnant, or undergoing an invasive urologic procedure.    Susceptibility        Citrobacter amalonaticus      BRANDIN      Amoxicillin + Clavulanate Resistant      Cefazolin Resistant      Cefepime Susceptible      Ceftazidime Susceptible      Ceftriaxone Susceptible      Gentamicin Susceptible      Levofloxacin Susceptible      Nitrofurantoin Intermediate      Piperacillin + Tazobactam Susceptible      Trimethoprim + Sulfamethoxazole Susceptible                           Blood Culture - Blood, Arm, Left [[id number]]  (Normal) Collected: 09/19/24 1928    Lab Status: Final result Specimen: Blood from Arm, Left Updated: 09/24/24 2000     Blood Culture No growth at 5 days           Recent films:  No radiology results from the last 24 hrs   Personal review of imaging : CXR shows last chest x-ray was done on 9/19/2024 showed bilateral chronic changes with bilateral superimposed infiltrate and small pleural effusion.      Pulmonary Assessment:    Acute on chronic hypoxic respiratory failure  Dependence on supplemental oxygen  Bilateral pulm infiltrate/post-COVID syndrome  Secondary bacterial pneumonia  History of interstitial lung disease  Chronic congestive diastolic heart failure with preserved EF.  Chronic obstructive pulmonary disease.  No carcinoma the right upper lobe of the lung with metastasis  Status post chemotherapy and radiation treatment  Allergic rhinitis    Recommend/plan:   Patient was seen in the [facility] in medical floor today.    He was seen in [facility] isolation but his brother was in the room.  He was sitting up in the bed on oxygen  His oxygen is titrated down to 6 L high flow oxygen and was sleeping this morning.    Did not sleep very well last night.  He was on Benadryl as needed.  I started him on trazodone 25 mg at bedtime  Currently on antibiotics cefepime.  We will monitor cultures and adjust  antibiotics  Continue Atrovent HFA.  He completed dexamethasone and already treated with COVID medications.  Continue titrating oxygen and keep saturation more than 92%.    Chest x-ray shows interstitial changes from chronic lung disease with superimposed infection or pneumonia  DVT and stress ulcer prophylaxis.  Pain and anxiety control.  Nutritional support.  Repeat labs and x-rays from time to time.  Continue COVID isolation  Patient had a complete course of dexamethasone.  Patient is known to me and I will follow him as an outpatient after discharge  I added him on Flonase and Zyrtec for nasal allergy.  Repeat labs and imaging studies and monitor inflammatory markers from time to time.  CODE STATUS: Full..  Overall prognosis: Guarded.  We will continue following    Time spent by me: 35 min    This visit was performed by both a physician and an [facility] RN.  I performed all aspects of the medical decision making as documented.    Electronically signed by     Tarik Crocker MD,  Pulmonologist/Intensivist   9/25/2024, 13:45 CDT

## 2024-09-25 NOTE — PROGRESS NOTES
[facility]  INPATIENT PROGRESS NOTE    Patient Name: Karoline Prater  Date of Admission: 9/19/2024  Today's Date: 09/25/24  Length of Stay: 5  Primary Care Physician: Irving Alexis MD    Subjective   Chief Complaint: acute on chronic respiratory failure with hypoxia  Shortness of Breath    Today  Patient has no new complaint today, feeling a lot better and his oxygen requirement has improved significantly, currently on 5 L of oxygen.    Review of Systems   Respiratory:  Positive for shortness of breath.       All pertinent negatives and positives are as above. All other systems have been reviewed and are negative unless otherwise stated.     Objective    Temp:  [96.4 °F (35.8 °C)-97.9 °F (36.6 °C)] 96.6 °F (35.9 °C)  Heart Rate:  [73-89] 73  Resp:  [16-18] 18  BP: (104-154)/(62-82) 131/76  Physical Exam  GEN: Awake, alert, interactive, in NAD, appears frail/chronically ill  HEENT: L eye blind, R eye normal motion/pupillary reflex. Anicteric  Lungs: diminished bs, equal chest rist, normal respiratory effort  Heart: +S1/s2, no rub  ABD: soft, nt/nd, +BS, no guarding/rebound  Extremities: atraumatic, no cyanosis, trace b/l LE edema  Skin: no rashes or petechiae  Neuro: AAOx3, no obvious focal deficits  Psych: normal mood & affect        Results Review:  I have reviewed the labs, radiology results, and diagnostic studies.    Laboratory Data:   Results from last 7 days   Lab Units 09/22/24  0404 09/21/24  0538 09/20/24  0433   WBC 10*3/mm3 7.22 7.39 8.09   HEMOGLOBIN g/dL 11.7* 11.4* 10.5*   HEMATOCRIT % 37.4* 36.9* 34.1*   PLATELETS 10*3/mm3 126* 135* 145        Results from last 7 days   Lab Units 09/24/24  0409 09/22/24  0404 09/21/24  0538 09/20/24  0433 09/19/24  1928   SODIUM mmol/L 137 137 139   < > 139   POTASSIUM mmol/L 4.0 4.1 3.9   < > 4.4   CHLORIDE mmol/L 100 103 104   < > 103   CO2 mmol/L 26.0 24.0 25.0   < > 26.0   BUN mg/dL 39* 38* 40*    < > 37*   CREATININE mg/dL 2.02* 1.91* 1.86*   < > 1.79*   CALCIUM mg/dL 9.5 9.5 9.5   < > 9.8   BILIRUBIN mg/dL  --   --  0.4  --  0.6   ALK PHOS U/L  --   --  80  --  100   ALT (SGPT) U/L  --   --  34  --  46*   AST (SGOT) U/L  --   --  31  --  41*   GLUCOSE mg/dL 121* 111* 111*   < > 113*    < > = values in this interval not displayed.       Culture Data:   Blood Culture   Date Value Ref Range Status   09/19/2024 No growth at 24 hours  Preliminary   09/19/2024 No growth at 24 hours  Preliminary       Radiology Data:   Imaging Results (Last 24 Hours)       ** No results found for the last 24 hours. **            I have reviewed the patient's current medications.     Assessment/Plan   Assessment  Active Hospital Problems    Diagnosis     **Acute on chronic respiratory failure with hypoxia     Acute-on-chronic respiratory failure     Pneumonitis     COVID-19 virus infection     Chronic heart failure with preserved ejection fraction (HFpEF)     COPD (chronic obstructive pulmonary disease)     A-fib     Pulmonary fibrosis     Stage 3b chronic kidney disease     Malignant neoplasm of upper lobe of right lung     Essential hypertension        Treatment Plan  #Acute on chronic respiratory failure with hypoxia -patient with worsening baseline hypoxia.  Underlying ILD.  Recent COVID.  Concern for possible secondary bacterial pneumonia.  Elevated Pro-Skinny  Pulmonologist is on consult and helping with management, follow recommendations  Patient's oxygen requirement improved this morning, currently needing 5L.    # Probable hospital-associated pneumonia [recent COVID-19 infection]  CT of the chest showed advanced chronic lung disease with suspected pneumonia.  Pulmonologist reviewed patient and started empiric antibiotic coverage with cefepime [vancomycin discontinued], will follow workup and pulmonologist recommendations.    # Right upper lobe mass on CT  CT showed the following-  Spiculated masslike opacity of the right  upper lobe, similar to only  minimally decreased compared to 8/3/2024, increased compared to  6/29/2024. Neoplastic process must be considered.  Discussed with pulmonologist and he recommended palliative/hospice evaluation.    #Acute on chronic preserved EF CHF -hard to say fully.  Patient is diuresing well.  Likely some slight fluid overload.  Continue to monitor diuretic response.  Continue Toprol-XL, Jardiance.  Not on additional goal-directed regimen due to renal function.    #CKD 3B -seems to be around baseline.  Monitor closely with diuretics and meds.    #COVID-19 -initially positive early September.  Still positive.  Suspect this is less likely acutely driving current illness    #Essential hypertension -BP stable on Toprol and Imdur    #COPD/Pulm fibrosis -supportive care    Medical Decision Making  Number and Complexity of problems: 1 acute, 2 acute on chronic, multiple chronic  Differential Diagnosis: as above    Conditions and Status        Clinically about the same as when I saw him yesterday, not at goal     MDM Data  External documents reviewed: none  Cardiac tracing (EKG, telemetry) interpretation: paced  Radiology interpretation: reports reviewed  Labs reviewed: as above  Any tests that were considered but not ordered: none     Decision rules/scores evaluated (example [id number], [place], etc): none      Care Planning  Shared decision making: Patient apprised of current labs, vitals, imaging and treatment plan.  They are agreeable with proceeding with plans as discussed.    Code status and discussions: full code    Disposition  Social Determinants of Health that impact treatment or disposition: none  I expect the patient to be discharged possibly tomorrow    Electronically signed by Mychal Bagley MD, 09/25/24, 16:37 CDT.

## 2024-09-25 NOTE — PLAN OF CARE
"Goal Outcome Evaluation:  Plan of Care Reviewed With: patient        Progress: improving     Pt refused sleep during shift. Pt states that he refuses to fall asleep \"after what happened the last night\". Pt is referring to momentary loss of orientation in which pt pulled off clothing, oxygen, and IV. It states that he feels very ashamed of how he acted. Pt reassured that behavior was not at all lewd, and there was no harm in his actions. Pt seemed sad during shift. Pt remains confident in his stance despite repeated reassurance and conversation. Pt ran V paced  on tele. Safety maintained during shift. Call light within reach. Video monitor used during shift.                              "

## 2024-09-25 NOTE — CASE MANAGEMENT/SOCIAL WORK
Continued Stay Note   Antoinette     Patient Name: Karoline Prater  MRN: [id number]  Today's Date: 9/25/2024    Admit Date: 9/19/2024    Plan: Home with hh   Discharge Plan       Row Name 09/25/24 0937       Plan    Plan Comments PT continues to plan to dc home with his brother. HH is recommended. SW will continue to follow and assist as needed.                   Discharge Codes    No documentation.                 Expected Discharge Date and Time       Expected Discharge Date Expected Discharge Time    Sep 23, 2024               DESHAWN Gil

## 2024-09-26 ENCOUNTER — APPOINTMENT (OUTPATIENT)
Dept: GENERAL RADIOLOGY | Facility: HOSPITAL | Age: 84
DRG: 189 | End: 2024-09-26
Payer: MEDICARE

## 2024-09-26 LAB
ANION GAP SERPL CALCULATED.3IONS-SCNC: 11 MMOL/L (ref 5–15)
BASOPHILS # BLD AUTO: 0.02 10*3/MM3 (ref 0–0.2)
BASOPHILS NFR BLD AUTO: 0.2 % (ref 0–1.5)
BUN SERPL-MCNC: 49 MG/DL (ref 8–23)
BUN/CREAT SERPL: 19.6 (ref 7–25)
CALCIUM SPEC-SCNC: 9.9 MG/DL (ref 8.6–10.5)
CHLORIDE SERPL-SCNC: 101 MMOL/L (ref 98–107)
CO2 SERPL-SCNC: 25 MMOL/L (ref 22–29)
CREAT SERPL-MCNC: 2.5 MG/DL (ref 0.76–1.27)
DEPRECATED RDW RBC AUTO: 58.9 FL (ref 37–54)
EGFRCR SERPLBLD CKD-EPI 2021: 25 ML/MIN/1.73
EOSINOPHIL # BLD AUTO: 0.33 10*3/MM3 (ref 0–0.4)
EOSINOPHIL NFR BLD AUTO: 3.9 % (ref 0.3–6.2)
ERYTHROCYTE [DISTWIDTH] IN BLOOD BY AUTOMATED COUNT: 17.1 % (ref 12.3–15.4)
GLUCOSE SERPL-MCNC: 114 MG/DL (ref 65–99)
HCT VFR BLD AUTO: 37 % (ref 37.5–51)
HGB BLD-MCNC: 11.3 G/DL (ref 13–17.7)
IMM GRANULOCYTES # BLD AUTO: 0.1 10*3/MM3 (ref 0–0.05)
IMM GRANULOCYTES NFR BLD AUTO: 1.2 % (ref 0–0.5)
LYMPHOCYTES # BLD AUTO: 0.67 10*3/MM3 (ref 0.7–3.1)
LYMPHOCYTES NFR BLD AUTO: 7.9 % (ref 19.6–45.3)
MAGNESIUM SERPL-MCNC: 2 MG/DL (ref 1.6–2.4)
MCH RBC QN AUTO: 28.8 PG (ref 26.6–33)
MCHC RBC AUTO-ENTMCNC: 30.5 G/DL (ref 31.5–35.7)
MCV RBC AUTO: 94.1 FL (ref 79–97)
MONOCYTES # BLD AUTO: 0.4 10*3/MM3 (ref 0.1–0.9)
MONOCYTES NFR BLD AUTO: 4.7 % (ref 5–12)
NEUTROPHILS NFR BLD AUTO: 7 10*3/MM3 (ref 1.7–7)
NEUTROPHILS NFR BLD AUTO: 82.1 % (ref 42.7–76)
NRBC BLD AUTO-RTO: 0 /100 WBC (ref 0–0.2)
PLATELET # BLD AUTO: 150 10*3/MM3 (ref 140–450)
PMV BLD AUTO: 11.7 FL (ref 6–12)
POTASSIUM SERPL-SCNC: 3.7 MMOL/L (ref 3.5–5.2)
RBC # BLD AUTO: 3.93 10*6/MM3 (ref 4.14–5.8)
SODIUM SERPL-SCNC: 137 MMOL/L (ref 136–145)
WBC NRBC COR # BLD AUTO: 8.52 10*3/MM3 (ref 3.4–10.8)

## 2024-09-26 PROCEDURE — 94761 N-INVAS EAR/PLS OXIMETRY MLT: CPT

## 2024-09-26 PROCEDURE — 83735 ASSAY OF MAGNESIUM: CPT | Performed by: INTERNAL MEDICINE

## 2024-09-26 PROCEDURE — 97116 GAIT TRAINING THERAPY: CPT

## 2024-09-26 PROCEDURE — 94664 DEMO&/EVAL PT USE INHALER: CPT

## 2024-09-26 PROCEDURE — 94799 UNLISTED PULMONARY SVC/PX: CPT

## 2024-09-26 PROCEDURE — 80048 BASIC METABOLIC PNL TOTAL CA: CPT | Performed by: INTERNAL MEDICINE

## 2024-09-26 PROCEDURE — 85025 COMPLETE CBC W/AUTO DIFF WBC: CPT | Performed by: INTERNAL MEDICINE

## 2024-09-26 PROCEDURE — 97110 THERAPEUTIC EXERCISES: CPT

## 2024-09-26 PROCEDURE — 25010000002 CEFEPIME PER 500 MG: Performed by: INTERNAL MEDICINE

## 2024-09-26 PROCEDURE — 25010000002 BUMETANIDE PER 0.5 MG: Performed by: FAMILY MEDICINE

## 2024-09-26 PROCEDURE — 71045 X-RAY EXAM CHEST 1 VIEW: CPT

## 2024-09-26 PROCEDURE — 99233 SBSQ HOSP IP/OBS HIGH 50: CPT | Performed by: INTERNAL MEDICINE

## 2024-09-26 RX ORDER — BUMETANIDE 0.25 MG/ML
1 INJECTION INTRAMUSCULAR; INTRAVENOUS DAILY
Status: DISCONTINUED | OUTPATIENT
Start: 2024-09-27 | End: 2024-09-29

## 2024-09-26 RX ADMIN — ATORVASTATIN CALCIUM 20 MG: 10 TABLET, FILM COATED ORAL at 21:22

## 2024-09-26 RX ADMIN — SODIUM BICARBONATE 650 MG: 650 TABLET ORAL at 18:07

## 2024-09-26 RX ADMIN — Medication 10 ML: at 10:47

## 2024-09-26 RX ADMIN — ALBUTEROL SULFATE 2 PUFF: 90 INHALANT RESPIRATORY (INHALATION) at 20:43

## 2024-09-26 RX ADMIN — BUDESONIDE AND FORMOTEROL FUMARATE DIHYDRATE 2 PUFF: 160; 4.5 AEROSOL RESPIRATORY (INHALATION) at 20:43

## 2024-09-26 RX ADMIN — TAMSULOSIN HYDROCHLORIDE 0.4 MG: 0.4 CAPSULE ORAL at 21:22

## 2024-09-26 RX ADMIN — CEFEPIME 2000 MG: 2 INJECTION, POWDER, FOR SOLUTION INTRAVENOUS at 14:18

## 2024-09-26 RX ADMIN — ALBUTEROL SULFATE 2 PUFF: 90 INHALANT RESPIRATORY (INHALATION) at 11:11

## 2024-09-26 RX ADMIN — ASPIRIN 81 MG: 81 TABLET, COATED ORAL at 10:46

## 2024-09-26 RX ADMIN — Medication 10 MG: at 21:21

## 2024-09-26 RX ADMIN — EMPAGLIFLOZIN 10 MG: 10 TABLET, FILM COATED ORAL at 10:46

## 2024-09-26 RX ADMIN — Medication 10 ML: at 21:22

## 2024-09-26 RX ADMIN — PANTOPRAZOLE SODIUM 40 MG: 40 TABLET, DELAYED RELEASE ORAL at 10:46

## 2024-09-26 RX ADMIN — FLUTICASONE PROPIONATE 2 SPRAY: 50 SPRAY, METERED NASAL at 10:47

## 2024-09-26 RX ADMIN — GUAIFENESIN 600 MG: 600 TABLET, EXTENDED RELEASE ORAL at 10:46

## 2024-09-26 RX ADMIN — BUDESONIDE AND FORMOTEROL FUMARATE DIHYDRATE 2 PUFF: 160; 4.5 AEROSOL RESPIRATORY (INHALATION) at 06:01

## 2024-09-26 RX ADMIN — SODIUM BICARBONATE 650 MG: 650 TABLET ORAL at 10:46

## 2024-09-26 RX ADMIN — GUAIFENESIN 600 MG: 600 TABLET, EXTENDED RELEASE ORAL at 21:22

## 2024-09-26 RX ADMIN — SODIUM BICARBONATE 650 MG: 650 TABLET ORAL at 21:22

## 2024-09-26 RX ADMIN — BUMETANIDE 1 MG: 0.25 INJECTION INTRAMUSCULAR; INTRAVENOUS at 05:48

## 2024-09-26 RX ADMIN — ISOSORBIDE MONONITRATE 30 MG: 30 TABLET, EXTENDED RELEASE ORAL at 10:46

## 2024-09-26 RX ADMIN — ACETAMINOPHEN 1000 MG: 500 TABLET, FILM COATED ORAL at 21:22

## 2024-09-26 RX ADMIN — ALBUTEROL SULFATE 2 PUFF: 90 INHALANT RESPIRATORY (INHALATION) at 06:01

## 2024-09-26 RX ADMIN — METOPROLOL SUCCINATE 25 MG: 25 TABLET, EXTENDED RELEASE ORAL at 10:46

## 2024-09-26 RX ADMIN — CETIRIZINE HYDROCHLORIDE 10 MG: 10 TABLET ORAL at 10:46

## 2024-09-26 NOTE — PLAN OF CARE
Goal Outcome Evaluation:  Plan of Care Reviewed With: patient      Pt in decent spirits this shift and slept very well. Good UOP, continent to the urinal. Remains on 6 liters high flow NC. VSS, CTM  Progress: improving

## 2024-09-26 NOTE — PLAN OF CARE
"Goal Outcome Evaluation:  Plan of Care Reviewed With: patient           Outcome Evaluation: Nutrition assessment completed per LOS. Pt in COVID isolation. PMH is CKD stage III. When asked about appetite and food while inpatient, pt noted that it could be better. Pt dislikes carrots, broccoli, and green beans. Pt described himself as an \"old-fashioned country man.\" Intake while inpatient is 60% (six meals over three days). Pt dislikes modified diet and asked if he could be changed to regular. Diet rationale was explained to pt. Yogurt was offered for additional nutrition but was declined. Pt was advised on available snacks on the unit and alternate meal selections like sandwiches. Pt is aware that he needs \"food in his body,\" so he is motivated to eat. Phone call was interrupted, and the room phone was not answered again. Will monitor and make recommendations as appropriate.                               "

## 2024-09-26 NOTE — THERAPY TREATMENT NOTE
Acute Care - Physical Therapy Treatment Note  [facility]     Patient Name: Karoline Prater  : 1940  MRN: [id number]  Today's Date: 2024      Visit Dx:     ICD-10-CM ICD-9-CM   1. Hypoxia  R09.02 799.02   2. Pneumonitis  J98.4 486   3. Acute UTI  N39.0 599.0   4. Impaired mobility [Z74.09]  Z74.09 799.89     Patient Active Problem List   Diagnosis    Dyspnea    Essential hypertension    NSVT (nonsustained ventricular tachycardia)    Malignant neoplasm of upper lobe of right lung    Stage 3b chronic kidney disease    Pancytopenia due to antineoplastic chemotherapy    Pneumonia due to infectious organism    Function kidney decreased    Cellulitis    Acute on chronic respiratory failure with hypoxia    Pulmonary fibrosis    Macrocytic anemia    Thrombocytopenia    Sepsis    Right pneumothorax    Hyperkalemia    Acute kidney injury superimposed on CKD stage 3b    GERD without esophagitis    A-fib    Former smoker    Chest pain    Tachycardia    Bilateral lower extremity edema    Metastatic adenocarcinoma of the RUL    Chronic diastolic congestive heart failure    COPD (chronic obstructive pulmonary disease)    S/P radiation > 12 weeks    CHF (congestive heart failure)    History of radiation therapy    Chronic heart failure with preserved ejection fraction (HFpEF)    Narrow complex tachycardia    Atrial flutter    Encounter for examination for normal comparison and control in clinical research program    CHF exacerbation    COVID-19 virus infection    Cytokine release syndrome, grade 2    Pneumonia, unspecified organism    Hyperlipidemia    Coronary artery disease    Pneumonitis    Acute-on-chronic respiratory failure     Past Medical History:   Diagnosis Date    Arthritis     Atrial fibrillation with rapid ventricular response 2019    Blindness of left eye     BPH with obstruction/lower urinary tract symptoms     Cancer     stomach & lung    CHF (congestive heart failure)     CKD (chronic kidney  disease) stage 3, GFR 30-59 ml/min     COPD with acute exacerbation 08/03/2024    Coronary artery disease     Elevated cholesterol     Essential hypertension 10/02/2017    Fibrotic lung diseases     GERD (gastroesophageal reflux disease)     Hearing loss     History of transfusion     Hydronephrosis of left kidney     Hyperlipidemia     Hypertension     pt was taken off of all of his medications for BP (atenolol, lisinopril, lasix) because his BP kept bottoming out so his primary dr told him to discontinue them 1-2 months ago (Jan/Feb 2019). pt states he takes no medications currently.    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 Patel is just going to keep an eye on it and not do surgery right now.    Mediastinal adenopathy 10/24/2018    Station [facility]    Monoclonal gammopathy of unknown significance (MGUS) 09/11/2018    Pancreatic mass     pt states he had this in 2013 but it went away on its own. Now recent CT shows it has come back so he is going to have an ultrasound on 3/13/19.    Shortness of breath      Past Surgical History:   Procedure Laterality Date    ABDOMINAL SURGERY      BRONCHOSCOPY N/A 10/24/2018    Procedure: BRONCHOSCOPY WITH BIOPSY, EBUS;  Surgeon: Gareth Becerra MD;  Location: [facility] OR;  Service: Pulmonary    CHOLECYSTECTOMY      COLONOSCOPY N/A 1/3/2019    Procedure: COLONOSCOPY WITH ANESTHESIA;  Surgeon: Randy Somers DO;  Location: [facility] ENDOSCOPY;  Service: Gastroenterology    CYSTOSCOPY, RETROGRADE PYELOGRAM AND STENT INSERTION Left 3/8/2019    Procedure: CYSTOSCOPY RETROGRADE BILATERAL PYELOGRAM;  Surgeon: Jos Sylvester MD;  Location: [facility] OR;  Service: Urology    ENDOSCOPY N/A 12/11/2018    Procedure: ESOPHAGOGASTRODUODENOSCOPY WITH ANESTHESIA;  Surgeon: Randy Somers DO;  Location: [facility] ENDOSCOPY;  Service:  Gastroenterology    ENDOSCOPY N/A 4/29/2019    Procedure: ESOPHAGOGASTRODUODENOSCOPY WITH ANESTHESIA;  Surgeon: Lilliam Jj MD;  Location: [facility] OR;  Service: Gastroenterology    ENDOSCOPY N/A 5/9/2019    Procedure: ESOPHAGOGASTRODUODENOSCOPY WITH ANESTHESIA;  Surgeon: Pilo Bansal MD;  Location: [facility] ENDOSCOPY;  Service: Gastroenterology    EYE SURGERY Left 1964    FOOT SURGERY Right 1966    joint    FRACTURE SURGERY      PACEMAKER IMPLANTATION Left 8/29/2024    Procedure: Leadless pacemaker implant and AVN ablation;  Surgeon: Carlitos Bowen MD;  Location: [facility] CATH INVASIVE LOCATION;  Service: Cardiovascular;  Laterality: Left;     PT Assessment (Last 12 Hours)       PT Evaluation and Treatment       Row Name 09/26/24 1342          Physical Therapy Time and Intention    Subjective Information complains of;weakness;fatigue  -LY     Document Type therapy note (daily note)  -LY     Mode of Treatment physical therapy  -LY       Row Name 09/26/24 1342          General Information    Existing Precautions/Restrictions fall;oxygen therapy device and L/min  6L hiflow, increased to 8L for activity  -LY       Row Name 09/26/24 1342          Pain    Pretreatment Pain Rating 0/10 - no pain  -LY       Row Name 09/26/24 1342          Bed Mobility    Supine-Sit [place] (Bed Mobility) verbal cues;contact guard  -LY     Sit-Supine [place] (Bed Mobility) verbal cues;contact guard  -LY     Assistive Device (Bed Mobility) head of bed elevated;bed rails  -LY       Row Name 09/26/24 1342          Sit-Stand Transfer    Sit-Stand [place] (Transfers) standby assist;contact guard  -LY       Row Name 09/26/24 1342          Gait/Stairs (Locomotion)    [place] Level (Gait) verbal cues;contact guard;minimum assist (75% patient effort)  -LY     Distance in Feet (Gait) 25  x2 reps  -LY     Pattern (Gait) step-through  -LY     Deviations/Abnormal Patterns (Gait) gait speed decreased  -LY     Bilateral Gait  Deviations forward flexed posture;heel strike decreased  -LY     Comment, (Gait/Stairs) Cont to have desat to 83% with ambulation on 8L hiflow, up to 5 min to recover each time seated. Reminders for pursed lip breathing. Pt is unsteady with turns and at times requires min x1 to maintain balance  -LY       Row Name 09/26/24 1342          Motor Skills    Comments, Therapeutic Exercise BLE AROM x20 reps seated  -LY       Row Name 09/26/24 1342          Plan of Care Review    Plan of Care Reviewed With patient  -LY     Progress improving  -LY       Row Name 09/26/24 1342          Vital Signs    Pre SpO2 (%) 91  -LY     O2 Delivery Pre Treatment hi-flow  6L  -LY     Intra SpO2 (%) 83   -LY     O2 Delivery Intra Treatment hi-flow   8L  -LY     Post SpO2 (%) 90  -LY     O2 Delivery Post Treatment hi-flow  6L  -LY     Pre Patient Position Supine  -LY     Intra Patient Position Sitting  -LY     Post Patient Position Supine  -LY     Recovery Time up to 5 min  -LY     Rest Breaks  --  extensive rest breaks after each activity  -LY       Row Name 09/26/24 1342          Positioning and Restraints    Pre-Treatment Position in bed  -LY     Post Treatment Position bed  -LY     In Bed fowlers;call light within reach;encouraged to call for assist;with family/caregiver  -LY               User Key  (r) = Recorded By, (t) = Taken By, (c) = Cosigned By      Initials Name Provider Type    LY Susanne Lizarraga, PTA Physical Therapist Assistant                    Physical Therapy Education       Title: PT OT SLP Therapies (In Progress)       Topic: Physical Therapy (In Progress)       Point: Mobility training (Done)       Learning Progress Summary             Patient Acceptance, E,TB, VU by  at 9/25/2024 0755    Acceptance, E,TB, VU by STEVEN at 9/24/2024 0745    Acceptance, E, VU,DU,NR by LEIDY at 9/20/2024 1400    Comment: benefits of activity, progression of PT POC                         Point: Home exercise program (Not Started)       Learner  Progress:  Not documented in this visit.              Point: Body mechanics (Not Started)       Learner Progress:  Not documented in this visit.              Point: Precautions (Not Started)       Learner Progress:  Not documented in this visit.                              User Key       Initials Effective Dates Name Provider Type Discipline    LEIDY 02/03/23 -  Cristian Hirsch PT DPT Physical Therapist PT     06/19/24 -  Rosa Aiken, RN Registered Nurse Nurse                  PT Recommendation and Plan  Anticipated Discharge Disposition (PT): home with 24/7 care, home with home health  Plan of Care Reviewed With: patient  Progress: improving  Outcome Evaluation: PT tx completed. Pt now on 6L hiflow. O2 sats decreased to 83% for supine to sit, unable to recover, increased to 8L and recovered in 1-2 min. Pt amb 25' with CGA, cues for pursed lip breathing. O2 sats once again decreased to 83%. Pt required 4-5 min of seated rest to recover. He completed BLE AROM and B scapular retraction while seated with frequent rest breaks. Pt returned to bed and O2 sats 79%, pt required several min of rest before recovering. Once 92%, O2 slowly lowered back to 6L. Will cont to follow.       Time Calculation:    PT Charges       Row Name 09/26/24 1429             Time Calculation    Start Time 1342  -LY      Stop Time 1421  -LY      Time Calculation (min) 39 min  -LY      PT Received On 09/26/24  -LY         Time Calculation- PT    Total Timed Code Minutes- PT 39 minute(s)  -LY         Timed Charges    74424 - PT Therapeutic Exercise Minutes 15  -LY      30610 - Gait Training Minutes  24  -LY         Total Minutes    Timed Charges Total Minutes 39  -LY       Total Minutes 39  -LY                User Key  (r) = Recorded By, (t) = Taken By, (c) = Cosigned By      Initials Name Provider Type    LY Susanne Lizarraga, PTA Physical Therapist Assistant                  Therapy Charges for Today       Code Description Service Date  Service Provider Modifiers Qty    99601815586 HC PT THER PROC EA 15 MIN 9/25/2024 Susanne Lizarraga, PTA GP 1    54295240578 HC GAIT TRAINING EA 15 MIN 9/25/2024 Susanne Lizarraga, PTA GP 1    42911411811 HC PT THERAPEUTIC ACT EA 15 MIN 9/25/2024 Susanne Lizarraga, PTA GP 1    72657230056 HC PT THER PROC EA 15 MIN 9/26/2024 Susanne Lizarraga, PTA GP 1    55513289448 HC GAIT TRAINING EA 15 MIN 9/26/2024 Susanne Lizarraga, PTA GP 2            PT G-Codes  Outcome Measure Options: AM-PAC 6 Clicks Basic Mobility (PT)  AM-PAC 6 Clicks Score (PT): 19    Susanne Lizarraga PTA  9/26/2024

## 2024-09-26 NOTE — PROGRESS NOTES
[facility] Medicine Services  INPATIENT PROGRESS NOTE    Patient Name: Karoline Prater  Date of Admission: 9/19/2024  Today's Date: 09/26/24  Length of Stay: 6  Primary Care Physician: Irving Alexis MD    Subjective   Chief Complaint: acute on chronic respiratory failure with hypoxia  Shortness of Breath    Today  Patient has no new complaint today, feeling a lot better     Review of Systems   Respiratory:  Positive for shortness of breath.       All pertinent negatives and positives are as above. All other systems have been reviewed and are negative unless otherwise stated.     Objective    Temp:  [96.6 °F (35.9 °C)-98 °F (36.7 °C)] 97.6 °F (36.4 °C)  Heart Rate:  [72-79] 76  Resp:  [16-19] 18  BP: (131-151)/(59-76) 150/66  Physical Exam  GEN: Awake, alert, interactive, in NAD, appears frail/chronically ill  HEENT: L eye blind, R eye normal motion/pupillary reflex. Anicteric  Lungs: diminished bs, equal chest rist, normal respiratory effort  Heart: +S1/s2, no rub  ABD: soft, nt/nd, +BS, no guarding/rebound  Extremities: atraumatic, no cyanosis, trace b/l LE edema  Skin: no rashes or petechiae  Neuro: AAOx3, no obvious focal deficits  Psych: normal mood & affect        Results Review:  I have reviewed the labs, radiology results, and diagnostic studies.    Laboratory Data:   Results from last 7 days   Lab Units 09/26/24 0449 09/22/24 0404 09/21/24  0538   WBC 10*3/mm3 8.52 7.22 7.39   HEMOGLOBIN g/dL 11.3* 11.7* 11.4*   HEMATOCRIT % 37.0* 37.4* 36.9*   PLATELETS 10*3/mm3 150 126* 135*        Results from last 7 days   Lab Units 09/26/24 0449 09/24/24 0409 09/22/24  0404 09/21/24  0538 09/20/24  0433 09/19/24  1928   SODIUM mmol/L 137 137 137 139   < > 139   POTASSIUM mmol/L 3.7 4.0 4.1 3.9   < > 4.4   CHLORIDE mmol/L 101 100 103 104   < > 103   CO2 mmol/L 25.0 26.0 24.0 25.0   < > 26.0   BUN mg/dL 49* 39* 38* 40*   < > 37*   CREATININE mg/dL 2.50* 2.02* 1.91*  1.86*   < > 1.79*   CALCIUM mg/dL 9.9 9.5 9.5 9.5   < > 9.8   BILIRUBIN mg/dL  --   --   --  0.4  --  0.6   ALK PHOS U/L  --   --   --  80  --  100   ALT (SGPT) U/L  --   --   --  34  --  46*   AST (SGOT) U/L  --   --   --  31  --  41*   GLUCOSE mg/dL 114* 121* 111* 111*   < > 113*    < > = values in this interval not displayed.       Culture Data:   Blood Culture   Date Value Ref Range Status   09/19/2024 No growth at 24 hours  Preliminary   09/19/2024 No growth at 24 hours  Preliminary       Radiology Data:   Imaging Results (Last 24 Hours)       ** No results found for the last 24 hours. **            I have reviewed the patient's current medications.     Assessment/Plan   Assessment  Active Hospital Problems    Diagnosis     **Acute on chronic respiratory failure with hypoxia     Acute-on-chronic respiratory failure     Pneumonitis     COVID-19 virus infection     Chronic heart failure with preserved ejection fraction (HFpEF)     COPD (chronic obstructive pulmonary disease)     A-fib     Pulmonary fibrosis     Stage 3b chronic kidney disease     Malignant neoplasm of upper lobe of right lung     Essential hypertension        Treatment Plan  #Acute on chronic respiratory failure with hypoxia -patient with worsening baseline hypoxia.  Underlying ILD.  Recent COVID.  Concern for possible secondary bacterial pneumonia.  Elevated Pro-Skinny  Pulmonologist is on consult and helping with management, follow recommendations  Patient's oxygen requirement improved, currently needing 6L.  We will plan discharge once patient's oxygen is weaned down to about 5 or less, with pulmonologist recommendations.    # Probable hospital-associated pneumonia [recent COVID-19 infection]  CT of the chest showed advanced chronic lung disease with suspected pneumonia.  Pulmonologist reviewed patient and started empiric antibiotic coverage with cefepime [vancomycin discontinued], will follow workup and pulmonologist recommendations.    # Right  upper lobe mass on CT  CT showed the following-  Spiculated masslike opacity of the right upper lobe, similar to only  minimally decreased compared to 8/3/2024, increased compared to  6/29/2024. Neoplastic process must be considered.  Discussed with pulmonologist and he recommended palliative/hospice evaluation.    #Acute on chronic preserved EF CHF -hard to say fully.  Patient is diuresing well.  Likely some slight fluid overload.  Continue to monitor diuretic response.  Continue Toprol-XL, Jardiance.  Not on additional goal-directed regimen due to renal function.    #CKD 3B -seems to be around baseline.    Monitor closely with diuretics and meds.  Currently on diuretics twice daily, creatinine going up and therefore we will decrease diuretic today.    #COVID-19 -initially positive early September.  Still positive.  Suspect this is less likely acutely driving current illness    #Essential hypertension -BP stable on Toprol and Imdur    #COPD/Pulm fibrosis -supportive care    Medical Decision Making  Number and Complexity of problems: 1 acute, 2 acute on chronic, multiple chronic  Differential Diagnosis: as above    Conditions and Status        Clinically about the same as when I saw him yesterday, not at goal     MDM Data  External documents reviewed: none  Cardiac tracing (EKG, telemetry) interpretation: paced  Radiology interpretation: reports reviewed  Labs reviewed: as above  Any tests that were considered but not ordered: none     Decision rules/scores evaluated (example [id number], Wells, etc): none      Care Planning  Shared decision making: Patient apprised of current labs, vitals, imaging and treatment plan.  They are agreeable with proceeding with plans as discussed.    Code status and discussions: full code    Disposition  Social Determinants of Health that impact treatment or disposition: none  I expect the patient to be discharged possibly tomorrow if oxygen requirement is 5 L or  less    Electronically signed by Mychal Bagley MD, 09/26/24, 15:33 CDT.

## 2024-09-26 NOTE — PROGRESS NOTES
[facility] [place] - [facility]    Patient: Karoline Prater  1940   MR# [id number]   Acct# [id number]  09/26/24   07:28 CDT  Referring Provider: Mychal Bagley MD    Chief Complaint: COVID    Interval history: Afebrile.  In COVID isolation.  Saturation 94 on 6 L.  Creatinine worse at 2.5.  He is getting twice a day IV diuresis.  White count normal.  He feels like his breathing is getting better.  He needs nothing new.    Meds:  acetaminophen, 1,000 mg, Oral, Nightly  albuterol sulfate HFA, 2 puff, Inhalation, TID - RT   And  ipratropium, 2 puff, Inhalation, TID - RT  aspirin, 81 mg, Oral, Daily  atorvastatin, 20 mg, Oral, Nightly  budesonide-formoterol, 2 puff, Inhalation, BID - RT  bumetanide, 1 mg, Intravenous, Q12H  cefepime, 2,000 mg, Intravenous, Q24H  cetirizine, 10 mg, Oral, Daily  empagliflozin, 10 mg, Oral, Daily  fluticasone, 2 spray, Each Nare, Daily  guaiFENesin, 600 mg, Oral, Q12H  isosorbide mononitrate, 30 mg, Oral, Daily Before Lunch  melatonin, 10 mg, Oral, Nightly  metoprolol succinate XL, 25 mg, Oral, Daily  pantoprazole, 40 mg, Oral, Daily  sodium bicarbonate, 650 mg, Oral, TID  sodium chloride, 10 mL, Intravenous, Q12H  tamsulosin, 0.4 mg, Oral, Nightly           Physical Exam:  SpO2 Percentage    09/26/24 0314 09/26/24 0507 09/26/24 0601   SpO2: 93% 94% 94%     Body mass index is 27.15 kg/m².   Temp:  [96.6 °F (35.9 °C)-98 °F (36.7 °C)] 98 °F (36.7 °C)  Heart Rate:  [72-83] 76  Resp:  [16-19] 18  BP: (121-151)/(59-80) 151/68  Intake/Output Summary (Last 24 hours) at 9/26/2024 0728  Last data filed at 9/26/2024 0507  Gross per 24 hour   Intake 1080 ml   Output 1500 ml   Net -420 ml     Physical Exam  Vitals and nursing note reviewed.   Constitutional:       Interventions: Nasal cannula in place.      Comments: 6L  HENT:      Head: Normocephalic.   Cardiovascular:      Rate and Rhythm: Normal rate and regular rhythm.   Pulmonary:      Effort: Pulmonary  effort is normal.   Neurological:      Mental Status: He is alert.      Motor: Weakness present.   Psychiatric:         Behavior: Behavior normal.     Electronically signed by MARITZA Martinez, 9/26/2024, 07:28 CDT      Physician Substantive Portion: Medical Decision Making to follow:      Physician substantive portion: medical decision making  Result Review  Laboratory Data:  Results from last 7 days   Lab Units 09/26/24 0449 09/22/24  0404 09/21/24  0538   WBC 10*3/mm3 8.52 7.22 7.39   HEMOGLOBIN g/dL 11.3* 11.7* 11.4*   PLATELETS 10*3/mm3 150 126* 135*     Results from last 7 days   Lab Units 09/26/24 0449 09/24/24 0409 09/22/24  0404 09/20/24  0433 09/19/24  1928   SODIUM mmol/L 137 137 137   < > 139   POTASSIUM mmol/L 3.7 4.0 4.1   < > 4.4   CO2 mmol/L 25.0 26.0 24.0   < > 26.0   BUN mg/dL 49* 39* 38*   < > 37*   CREATININE mg/dL 2.50* 2.02* 1.91*   < > 1.79*   LACTATE mmol/L  --   --   --   --  1.6    < > = values in this interval not displayed.         Microbiology Results (last 10 days)       Procedure Component Value - Date/Time    MRSA Screen, PCR (Inpatient) - Swab, Nares [[id number]]  (Normal) Collected: 09/21/24 1300    Lab Status: Final result Specimen: Swab from Nares Updated: 09/21/24 1431     MRSA PCR No MRSA Detected    Narrative:      The negative predictive value of this diagnostic test is high and should only be used to consider de-escalating anti-MRSA therapy. A positive result may indicate colonization with MRSA and must be correlated clinically.    Respiratory Culture - Sputum, Cough [[id number]] Collected: 09/21/24 0047    Lab Status: Final result Specimen: Sputum from Cough Updated: 09/23/24 1032     Respiratory Culture Scant growth (1+) Normal Respiratory Nya     Gram Stain Moderate (3+) WBCs per low power field      Rare (1+) Epithelial cells per low power field      Few (2+) Mixed gram positive nya    Blood Culture - Blood, Arm, Left [[id number]]  (Normal) Collected: 09/19/24  2025    Lab Status: Final result Specimen: Blood from Arm, Left Updated: 09/24/24 2100     Blood Culture No growth at 5 days    Respiratory Panel PCR w/COVID-19(SARS-CoV-2) JORDYN/MATILDE/BRYAN/PAD/COR/ASHER In-House, NP Swab in UTM/VTM, 2 HR TAT - Swab, Nasopharynx [[id number]]  (Abnormal) Collected: 09/19/24 1944    Lab Status: Final result Specimen: Swab from Nasopharynx Updated: 09/19/24 2113     ADENOVIRUS, PCR Not Detected     Coronavirus 229E Not Detected     Coronavirus HKU1 Not Detected     Coronavirus NL63 Not Detected     Coronavirus OC43 Not Detected     COVID19 Detected     Human Metapneumovirus Not Detected     Human Rhinovirus/Enterovirus Not Detected     Influenza A PCR Not Detected     Influenza B PCR Not Detected     Parainfluenza Virus 1 Not Detected     Parainfluenza Virus 2 Not Detected     Parainfluenza Virus 3 Not Detected     Parainfluenza Virus 4 Not Detected     RSV, PCR Not Detected     Bordetella pertussis pcr Not Detected     Bordetella parapertussis PCR Not Detected     Chlamydophila pneumoniae PCR Not Detected     Mycoplasma pneumo by PCR Not Detected    Narrative:      In the setting of a positive respiratory panel with a viral infection PLUS a negative procalcitonin without other underlying concern for bacterial infection, consider observing off antibiotics or discontinuation of antibiotics and continue supportive care. If the respiratory panel is positive for atypical bacterial infection (Bordetella pertussis, Chlamydophila pneumoniae, or Mycoplasma pneumoniae), consider antibiotic de-escalation to target atypical bacterial infection.    Urine Culture - Urine, Urine, Clean Catch [[id number]]  (Abnormal)  (Susceptibility) Collected: 09/19/24 1943    Lab Status: Final result Specimen: Urine, Clean Catch Updated: 09/22/24 1209     Urine Culture >100,000 CFU/mL Citrobacter amalonaticus    Narrative:      Colonization of the urinary tract without infection is common. Treatment is discouraged unless  the patient is symptomatic, pregnant, or undergoing an invasive urologic procedure.    Susceptibility        Citrobacter amalonaticus      BRANDIN      Amoxicillin + Clavulanate Resistant      Cefazolin Resistant      Cefepime Susceptible      Ceftazidime Susceptible      Ceftriaxone Susceptible      Gentamicin Susceptible      Levofloxacin Susceptible      Nitrofurantoin Intermediate      Piperacillin + Tazobactam Susceptible      Trimethoprim + Sulfamethoxazole Susceptible                           Blood Culture - Blood, Arm, Left [[id number]]  (Normal) Collected: 09/19/24 1928    Lab Status: Final result Specimen: Blood from Arm, Left Updated: 09/24/24 2000     Blood Culture No growth at 5 days           Recent films:  No radiology results from the last 24 hrs   Personal review of imaging : CXR shows last chest x-ray done on 919 showed stable bilateral pulmonary infiltrate and repeat chest x-ray ordered for tomorrow morning.      Pulmonary Assessment:  Acute on chronic hypoxic respiratory failure  Dependence on supplemental oxygen  Bilateral pulm infiltrate/post-COVID syndrome  Secondary bacterial pneumonia  History of interstitial lung disease  Chronic congestive diastolic heart failure with preserved EF.  Chronic obstructive pulmonary disease.  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    He was on 6 L of oxygen and was resting comfortably.  He did work with physical therapy this morning.  He did sleep with trazodone last night.  She is also getting alprazolam for anxiety  Currently on antibiotics cefepime.  This will be completed soon.  We will monitor cultures and adjust antibiotics  Patient completed dexamethasone and already treated with COVID medications.  Continue titrating oxygen and keep saturation more than 92%.  Continue on albuterol  HFA and  Chest x-ray shows interstitial changes from chronic lung disease with superimposed infection or pneumonia  DVT and stress ulcer prophylaxis.  Pain and anxiety control.  Nutritional support.  Repeat labs and x-rays from time to time.  Continue COVID isolation  Patient did complete course of dexamethasone.  Currently not on any steroid.  Patient is known to me and I will follow him as an outpatient after discharge  Continue him on Flonase and Zyrtec for nasal allergy.  Repeat labs and imaging studies and monitor inflammatory markers from time to time.  CODE STATUS: Full..  Overall prognosis: Guarded.  We will continue following    Time spent by me: 35 min    This visit was performed by both a physician and an [facility] RN.  I performed all aspects of the medical decision making as documented.    Electronically signed by     Tarik Crocker MD,   Pulmonologist/Intensivist   9/26/2024, 18:56 CDT

## 2024-09-27 PROBLEM — J15.9 BACTERIAL PNEUMONIA: Status: ACTIVE | Noted: 2024-09-08

## 2024-09-27 LAB
ANION GAP SERPL CALCULATED.3IONS-SCNC: 14 MMOL/L (ref 5–15)
BUN SERPL-MCNC: 47 MG/DL (ref 8–23)
BUN/CREAT SERPL: 21.6 (ref 7–25)
CALCIUM SPEC-SCNC: 10.4 MG/DL (ref 8.6–10.5)
CHLORIDE SERPL-SCNC: 99 MMOL/L (ref 98–107)
CO2 SERPL-SCNC: 24 MMOL/L (ref 22–29)
CREAT SERPL-MCNC: 2.18 MG/DL (ref 0.76–1.27)
CRP SERPL-MCNC: 10.32 MG/DL (ref 0–0.5)
EGFRCR SERPLBLD CKD-EPI 2021: 29.5 ML/MIN/1.73
GLUCOSE SERPL-MCNC: 242 MG/DL (ref 65–99)
POTASSIUM SERPL-SCNC: 4 MMOL/L (ref 3.5–5.2)
PROCALCITONIN SERPL-MCNC: 0.28 NG/ML (ref 0–0.25)
SODIUM SERPL-SCNC: 137 MMOL/L (ref 136–145)

## 2024-09-27 PROCEDURE — 94799 UNLISTED PULMONARY SVC/PX: CPT

## 2024-09-27 PROCEDURE — 99233 SBSQ HOSP IP/OBS HIGH 50: CPT | Performed by: INTERNAL MEDICINE

## 2024-09-27 PROCEDURE — 94664 DEMO&/EVAL PT USE INHALER: CPT

## 2024-09-27 PROCEDURE — 25010000002 CEFEPIME PER 500 MG: Performed by: INTERNAL MEDICINE

## 2024-09-27 PROCEDURE — 80048 BASIC METABOLIC PNL TOTAL CA: CPT | Performed by: INTERNAL MEDICINE

## 2024-09-27 PROCEDURE — 97116 GAIT TRAINING THERAPY: CPT

## 2024-09-27 PROCEDURE — 94761 N-INVAS EAR/PLS OXIMETRY MLT: CPT

## 2024-09-27 PROCEDURE — 84145 PROCALCITONIN (PCT): CPT | Performed by: INTERNAL MEDICINE

## 2024-09-27 PROCEDURE — 86140 C-REACTIVE PROTEIN: CPT | Performed by: INTERNAL MEDICINE

## 2024-09-27 RX ADMIN — SODIUM BICARBONATE 650 MG: 650 TABLET ORAL at 08:29

## 2024-09-27 RX ADMIN — ASPIRIN 81 MG: 81 TABLET, COATED ORAL at 08:29

## 2024-09-27 RX ADMIN — METOPROLOL SUCCINATE 25 MG: 25 TABLET, EXTENDED RELEASE ORAL at 08:29

## 2024-09-27 RX ADMIN — ALBUTEROL SULFATE 2 PUFF: 90 INHALANT RESPIRATORY (INHALATION) at 14:43

## 2024-09-27 RX ADMIN — CETIRIZINE HYDROCHLORIDE 10 MG: 10 TABLET ORAL at 08:29

## 2024-09-27 RX ADMIN — SODIUM BICARBONATE 650 MG: 650 TABLET ORAL at 20:35

## 2024-09-27 RX ADMIN — BUDESONIDE AND FORMOTEROL FUMARATE DIHYDRATE 2 PUFF: 160; 4.5 AEROSOL RESPIRATORY (INHALATION) at 06:57

## 2024-09-27 RX ADMIN — FLUTICASONE PROPIONATE 2 SPRAY: 50 SPRAY, METERED NASAL at 08:29

## 2024-09-27 RX ADMIN — GUAIFENESIN 600 MG: 600 TABLET, EXTENDED RELEASE ORAL at 20:35

## 2024-09-27 RX ADMIN — SODIUM BICARBONATE 650 MG: 650 TABLET ORAL at 15:58

## 2024-09-27 RX ADMIN — CEFEPIME 2000 MG: 2 INJECTION, POWDER, FOR SOLUTION INTRAVENOUS at 13:14

## 2024-09-27 RX ADMIN — EMPAGLIFLOZIN 10 MG: 10 TABLET, FILM COATED ORAL at 08:29

## 2024-09-27 RX ADMIN — Medication 10 ML: at 08:29

## 2024-09-27 RX ADMIN — TAMSULOSIN HYDROCHLORIDE 0.4 MG: 0.4 CAPSULE ORAL at 20:35

## 2024-09-27 RX ADMIN — ATORVASTATIN CALCIUM 20 MG: 10 TABLET, FILM COATED ORAL at 20:35

## 2024-09-27 RX ADMIN — BUDESONIDE AND FORMOTEROL FUMARATE DIHYDRATE 2 PUFF: 160; 4.5 AEROSOL RESPIRATORY (INHALATION) at 20:03

## 2024-09-27 RX ADMIN — PANTOPRAZOLE SODIUM 40 MG: 40 TABLET, DELAYED RELEASE ORAL at 08:29

## 2024-09-27 RX ADMIN — ALBUTEROL SULFATE 2 PUFF: 90 INHALANT RESPIRATORY (INHALATION) at 20:03

## 2024-09-27 RX ADMIN — Medication 10 MG: at 20:35

## 2024-09-27 RX ADMIN — ACETAMINOPHEN 1000 MG: 500 TABLET, FILM COATED ORAL at 20:35

## 2024-09-27 RX ADMIN — ALBUTEROL SULFATE 2 PUFF: 90 INHALANT RESPIRATORY (INHALATION) at 06:57

## 2024-09-27 RX ADMIN — Medication 10 ML: at 20:35

## 2024-09-27 RX ADMIN — GUAIFENESIN 600 MG: 600 TABLET, EXTENDED RELEASE ORAL at 08:29

## 2024-09-27 RX ADMIN — ISOSORBIDE MONONITRATE 30 MG: 30 TABLET, EXTENDED RELEASE ORAL at 11:42

## 2024-09-27 NOTE — PLAN OF CARE
Goal Outcome Evaluation:  Plan of Care Reviewed With: patient        Progress: improving  Outcome Evaluation: Bed mobility SBA. Sit to stand CGA. Amb 25' Maty-CGA  with mild unsteadiness during turns. Educated on breathing technique. Continue to required increase in time to allow o2 to increase. 85-87% during sitting rest and 90-92% after recovery. Pt amb on 8L and placed back on 6L once back in bed.

## 2024-09-27 NOTE — PLAN OF CARE
Admitted 9/19 for sob; home 02 user; (+) covid  Consult pulmonology  SCDs  Poss dc home today    Problem: Adult Inpatient Plan of Care  Goal: Plan of Care Review  Outcome: Progressing  Flowsheets (Taken 9/27/2024 0346)  Plan of Care Reviewed With: patient     Problem: COPD (Chronic Obstructive Pulmonary Disease) Comorbidity  Goal: Maintenance of COPD Symptom Control  Outcome: Progressing     Problem: Heart Failure Comorbidity  Goal: Maintenance of Heart Failure Symptom Control  Outcome: Progressing     Problem: Hypertension Comorbidity  Goal: Blood Pressure in Desired Range  Outcome: Progressing     Problem: Fall Injury Risk  Goal: Absence of Fall and Fall-Related Injury  Outcome: Progressing  Intervention: Promote Injury-Free Environment  Recent Flowsheet Documentation  Taken 9/27/2024 0316 by Bradley Patel RN  Safety Promotion/Fall Prevention: safety round/check completed  Taken 9/27/2024 0200 by Bradley Patel RN  Safety Promotion/Fall Prevention: safety round/check completed  Taken 9/27/2024 0100 by Bradley Patel RN  Safety Promotion/Fall Prevention: safety round/check completed  Taken 9/27/2024 0000 by Bradley Patel RN  Safety Promotion/Fall Prevention: safety round/check completed  Taken 9/26/2024 2300 by Bradley Patel RN  Safety Promotion/Fall Prevention: safety round/check completed  Taken 9/26/2024 2118 by Bradley Patel RN  Safety Promotion/Fall Prevention: safety round/check completed  Taken 9/26/2024 1927 by Bradley Patel RN  Safety Promotion/Fall Prevention: safety round/check completed     Problem: Adult Inpatient Plan of Care  Goal: Absence of Hospital-Acquired Illness or Injury  Intervention: Identify and Manage Fall Risk  Recent Flowsheet Documentation  Taken 9/27/2024 0316 by Bradley Patel RN  Safety Promotion/Fall Prevention: safety round/check completed  Taken 9/27/2024 0200 by Bradley Patel RN  Safety Promotion/Fall Prevention: safety round/check completed  Taken 9/27/2024 0100 by Bradley Patel RN  Safety  Promotion/Fall Prevention: safety round/check completed  Taken 9/27/2024 0000 by Bradley Patel RN  Safety Promotion/Fall Prevention: safety round/check completed  Taken 9/26/2024 2300 by Bradley Patel RN  Safety Promotion/Fall Prevention: safety round/check completed  Taken 9/26/2024 2118 by Bradley Patel RN  Safety Promotion/Fall Prevention: safety round/check completed  Taken 9/26/2024 1927 by Bradley Patel RN  Safety Promotion/Fall Prevention: safety round/check completed  Intervention: Prevent and Manage VTE (Venous Thromboembolism) Risk  Recent Flowsheet Documentation  Taken 9/26/2024 2118 by Bradley Patel RN  VTE Prevention/Management:   bilateral   sequential compression devices off  Range of Motion: active ROM (range of motion) encouraged  Intervention: Prevent Infection  Recent Flowsheet Documentation  Taken 9/26/2024 2118 by Bradley Patel RN  Infection Prevention:   single patient room provided   personal protective equipment utilized   hand hygiene promoted     Problem: Infection Progression (Sepsis/Septic Shock)  Goal: Absence of Infection Signs and Symptoms  Intervention: Initiate Sepsis Management  Recent Flowsheet Documentation  Taken 9/26/2024 2118 by Bradley Patel RN  Infection Prevention:   single patient room provided   personal protective equipment utilized   hand hygiene promoted   Goal Outcome Evaluation:  Plan of Care Reviewed With: patient

## 2024-09-27 NOTE — PROGRESS NOTES
[facility] Medicine Services  INPATIENT PROGRESS NOTE    Patient Name: Karoline Prater  Date of Admission: 9/19/2024  Today's Date: 09/27/24  Length of Stay: 7  Primary Care Physician: Irving Alexis MD    Subjective   Chief Complaint: follow-up shortness of breath  HPI   Patient was on 5 L by nasal cannula when I saw him this morning.  I brought him his breakfast tray which was waiting outside his room.  He remains in enhanced contact and droplet precautions.  Of note, looking back at records it appears that he originally tested positive for COVID-19 on 9/5/2024.  When he was admitted to our facility on 9/19 he continued to test positive for COVID-19.  He has been treated for a secondary bacterial pneumonia in the setting of interstitial/pulmonary fibrosis.  He does report significant shortness of breath symptoms with any activity.  Even mild activity such as getting out of bed and taking a few steps in the room will cause his O2 sats to drop quite low per his report.  It sounds like he has been placed on 8 L by nasal cannula with any activity.  He denies any new cough.  He reports having excellent urine output following his recent administration of diuretics.    Review of Systems   All pertinent negatives and positives are as above. All other systems have been reviewed and are negative unless otherwise stated.     Objective    Temp:  [97.6 °F (36.4 °C)-98.4 °F (36.9 °C)] 98.4 °F (36.9 °C)  Heart Rate:  [57-82] 80  Resp:  [16-20] 17  BP: (119-142)/(52-71) 121/71  Physical Exam  Vitals reviewed.   Constitutional:       General: He is not in acute distress.     Appearance: He is ill-appearing.   HENT:      Head: Normocephalic.      Mouth/Throat:      Mouth: Mucous membranes are moist.   Eyes:      Pupils: Pupils are equal, round, and reactive to light.   Cardiovascular:      Rate and Rhythm: Normal rate.   Pulmonary:      Effort: Pulmonary effort is normal.       Comments: Scattered crackly BSs noted bilaterally; no prominent wheezing; some pursed lip breathing and increased work of breathing noted with even minimal activity  Abdominal:      Palpations: Abdomen is soft.   Musculoskeletal:         General: No deformity.   Skin:     General: Skin is warm.   Neurological:      Mental Status: He is alert.      Motor: Weakness present.   Psychiatric:         Mood and Affect: Mood normal.       Results Review:  I have reviewed the labs, radiology results, and diagnostic studies.    Laboratory Data:   Results from last 7 days   Lab Units 09/26/24 0449 09/22/24 0404 09/21/24  0538   WBC 10*3/mm3 8.52 7.22 7.39   HEMOGLOBIN g/dL 11.3* 11.7* 11.4*   HEMATOCRIT % 37.0* 37.4* 36.9*   PLATELETS 10*3/mm3 150 126* 135*        Results from last 7 days   Lab Units 09/26/24 0449 09/24/24 0409 09/22/24 0404 09/21/24  0538   SODIUM mmol/L 137 137 137 139   POTASSIUM mmol/L 3.7 4.0 4.1 3.9   CHLORIDE mmol/L 101 100 103 104   CO2 mmol/L 25.0 26.0 24.0 25.0   BUN mg/dL 49* 39* 38* 40*   CREATININE mg/dL 2.50* 2.02* 1.91* 1.86*   CALCIUM mg/dL 9.9 9.5 9.5 9.5   BILIRUBIN mg/dL  --   --   --  0.4   ALK PHOS U/L  --   --   --  80   ALT (SGPT) U/L  --   --   --  34   AST (SGOT) U/L  --   --   --  31   GLUCOSE mg/dL 114* 121* 111* 111*       Culture Data:   Respiratory Culture   Date Value Ref Range Status   09/21/2024 Scant growth (1+) Normal Respiratory Sherrie  Final       Radiology Data:   Imaging Results (Last 24 Hours)       Procedure Component Value Units Date/Time    XR Chest 1 View [[id number]] Collected: 09/26/24 2152     Updated: 09/26/24 2157    Narrative:      EXAMINATION: XR CHEST 1 VW-  9/26/2024 9:52 PM     HISTORY: COVID-pneumonia.     FINDINGS: Today's exam is compared to previous study of 1 week earlier.  A tunneled infusion cath remains in place with the tip in the SVC.  Diffuse interstitial changes are noted of the lungs which appear to be  somewhat chronic in nature. There is  blunting of the right lateral  costophrenic angle suggesting either pleural thickening or a small  effusion. The heart is enlarged. A leadless pacer projects over the left  heart border.       Impression:      1.. Increased interstitial markings which appear to be chronic in nature  and may represent an element of interstitial fibrosis. There is chronic  blunting of the right lateral costophrenic angle.  2. No acute infiltrate or free air.     This report was signed and finalized on 9/26/2024 9:53 PM by Dr. Naveed Reynolds MD.               I have reviewed the patient's current medications.     Assessment/Plan   Assessment  Active Hospital Problems    Diagnosis     **Acute on chronic respiratory failure with hypoxia     Acute-on-chronic respiratory failure     Pneumonitis     Bacterial pneumonia     COVID-19 virus infection     Chronic heart failure with preserved ejection fraction (HFpEF)     COPD (chronic obstructive pulmonary disease)     A-fib     Pulmonary fibrosis     Stage 3b chronic kidney disease     Malignant neoplasm of upper lobe of right lung     Essential hypertension        Treatment Plan  Patient on 5LNC this morning (patient on 3LNC chronically in the outpatient setting).  Still requiring at least 8LNC with even minimal activity  Currently on IV cefepime -day 7 today  Pulmonary following and appreciate their assistance  Diuretics on hold - creatinine was 2.5 yesterday in AM.  BMP not completed this morning and will order now.  Also order procalcitonin and CRP  CXR personally reviewed and findings of interstitial fibrosis; recent CT scan of the chest also revealed a spiculated masslike lesion involving the right upper lobe.      7.  Old records reviewed and it looks like patient initially tested positive for COVID-19 on 9/5/2024.  He was subsequently hospitalized from 9/5 through 9/8.  He was discharged home with prescription for dexamethasone-completed a full course of dexamethasone in the  outpatient setting.  He also received 2 days of remdesivir during his previous hospitalization per discharge summary on 9/8/2024.  8.  Continue albuterol and Atrovent HFA  9.  Continue Symbicort  10.  Continue incentive spirometry and flutter valve  11.  ? Trial of repeat steroids; appreciate Pulm input  12.  PT and OT; patient indicated that he lives at home alone.  He states that he has a brother that lives a couple of miles away from him.    Medical Decision Making  Number and Complexity of problems: 2 acute; high complexity      Conditions and Status        Condition is unchanged.     [facility] Data  External documents reviewed: previous discharge summary from 9/5-9/8/2024 reviewed  Cardiac tracing (EKG, telemetry) interpretation: no new EKGs this AM  Radiology interpretation: As above  Labs reviewed: labs pending this AM  Any tests that were considered but not ordered: none     Decision rules/scores evaluated (example [id number], [place], etc): none     Discussed with: patient     Care Planning  Shared decision making: Discussed with patient with agreement to proceed with treatment plan as outlined  Code status and discussions: Full code    Disposition  Social Determinants of Health that impact treatment or disposition: None apparent at this time; patient does live at home alone  I expect the patient to be discharged to home with home health (possibly) when 02 requirement has improved (still requiring 8LNC with even minimal activity).    Electronically signed by Prasanth Stewart MD, 09/27/24, 08:14 CDT.

## 2024-09-27 NOTE — PLAN OF CARE
Goal Outcome Evaluation:           Progress: improving  Outcome Evaluation: vss  70-86, 6L NC. A&Ox4. Covid precautions DC'd today. IV abx given. Worked with therapy today. No c/o pain.

## 2024-09-27 NOTE — CASE MANAGEMENT/SOCIAL WORK
Continued Stay Note   Antoinette     Patient Name: Karoline Prater  MRN: [id number]  Today's Date: 9/27/2024    Admit Date: 9/19/2024    Plan: Home Health   Discharge Plan       Row Name 09/27/24 1007       Plan    Plan Home Health    Patient/Family in Agreement with Plan yes    Plan Comments Pt will return home with brother as before. It is mentioned of need for HH at d/c, will set up when orders written.  Will follow.                   Discharge Codes    No documentation.                 Expected Discharge Date and Time       Expected Discharge Date Expected Discharge Time    Sep 28, 2024               ANDI Keene

## 2024-09-27 NOTE — THERAPY TREATMENT NOTE
Acute Care - Physical Therapy Treatment Note  [facility]     Patient Name: Karoline Prater  : 1940  MRN: [id number]  Today's Date: 2024      Visit Dx:     ICD-10-CM ICD-9-CM   1. Hypoxia  R09.02 799.02   2. Pneumonitis  J98.4 486   3. Acute UTI  N39.0 599.0   4. Impaired mobility [Z74.09]  Z74.09 799.89     Patient Active Problem List   Diagnosis    Dyspnea    Essential hypertension    NSVT (nonsustained ventricular tachycardia)    Malignant neoplasm of upper lobe of right lung    Stage 3b chronic kidney disease    Pancytopenia due to antineoplastic chemotherapy    Pneumonia due to infectious organism    Function kidney decreased    Cellulitis    Acute on chronic respiratory failure with hypoxia    Pulmonary fibrosis    Macrocytic anemia    Thrombocytopenia    Sepsis    Right pneumothorax    Hyperkalemia    Acute kidney injury superimposed on CKD stage 3b    GERD without esophagitis    A-fib    Former smoker    Chest pain    Tachycardia    Bilateral lower extremity edema    Metastatic adenocarcinoma of the RUL    Chronic diastolic congestive heart failure    COPD (chronic obstructive pulmonary disease)    S/P radiation > 12 weeks    CHF (congestive heart failure)    History of radiation therapy    Chronic heart failure with preserved ejection fraction (HFpEF)    Narrow complex tachycardia    Atrial flutter    Encounter for examination for normal comparison and control in clinical research program    CHF exacerbation    COVID-19 virus infection    Cytokine release syndrome, grade 2    Bacterial pneumonia    Hyperlipidemia    Coronary artery disease    Pneumonitis    Acute-on-chronic respiratory failure     Past Medical History:   Diagnosis Date    Arthritis     Atrial fibrillation with rapid ventricular response 2019    Blindness of left eye     BPH with obstruction/lower urinary tract symptoms     Cancer     stomach & lung    CHF (congestive heart failure)     CKD (chronic kidney disease)  stage 3, GFR 30-59 ml/min     COPD with acute exacerbation 08/03/2024    Coronary artery disease     Elevated cholesterol     Essential hypertension 10/02/2017    Fibrotic lung diseases     GERD (gastroesophageal reflux disease)     Hearing loss     History of transfusion     Hydronephrosis of left kidney     Hyperlipidemia     Hypertension     pt was taken off of all of his medications for BP (atenolol, lisinopril, lasix) because his BP kept bottoming out so his primary dr told him to discontinue them 1-2 months ago (Jan/Feb 2019). pt states he takes no medications currently.    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 Patel is just going to keep an eye on it and not do surgery right now.    Mediastinal adenopathy 10/24/2018    Station 7    Monoclonal gammopathy of unknown significance (MGUS) 09/11/2018    Pancreatic mass     pt states he had this in 2013 but it went away on its own. Now recent CT shows it has come back so he is going to have an ultrasound on 3/13/19.    Shortness of breath      Past Surgical History:   Procedure Laterality Date    ABDOMINAL SURGERY      BRONCHOSCOPY N/A 10/24/2018    Procedure: BRONCHOSCOPY WITH BIOPSY, EBUS;  Surgeon: Gareth Becerra MD;  Location: [facility] OR;  Service: Pulmonary    CHOLECYSTECTOMY      COLONOSCOPY N/A 1/3/2019    Procedure: COLONOSCOPY WITH ANESTHESIA;  Surgeon: Randy Somers DO;  Location: [facility] ENDOSCOPY;  Service: Gastroenterology    CYSTOSCOPY, RETROGRADE PYELOGRAM AND STENT INSERTION Left 3/8/2019    Procedure: CYSTOSCOPY RETROGRADE BILATERAL PYELOGRAM;  Surgeon: Jos Sylvester MD;  Location: [facility] OR;  Service: Urology    ENDOSCOPY N/A 12/11/2018    Procedure: ESOPHAGOGASTRODUODENOSCOPY WITH ANESTHESIA;  Surgeon: Randy Somers DO;  Location: [facility] ENDOSCOPY;  Service:  Gastroenterology    ENDOSCOPY N/A 4/29/2019    Procedure: ESOPHAGOGASTRODUODENOSCOPY WITH ANESTHESIA;  Surgeon: Lilliam Jj MD;  Location: [facility] OR;  Service: Gastroenterology    ENDOSCOPY N/A 5/9/2019    Procedure: ESOPHAGOGASTRODUODENOSCOPY WITH ANESTHESIA;  Surgeon: Pilo Bansal MD;  Location: [facility] ENDOSCOPY;  Service: Gastroenterology    EYE SURGERY Left 1964    FOOT SURGERY Right 1966    joint    FRACTURE SURGERY      PACEMAKER IMPLANTATION Left 8/29/2024    Procedure: Leadless pacemaker implant and AVN ablation;  Surgeon: Carlitos Bowen MD;  Location: [facility] CATH INVASIVE LOCATION;  Service: Cardiovascular;  Laterality: Left;     PT Assessment (Last 12 Hours)       PT Evaluation and Treatment       Row Name 09/27/24 0910          Physical Therapy Time and Intention    Subjective Information complains of;weakness  -WK     Document Type therapy note (daily note)  -WK     Mode of Treatment physical therapy  -WK       Row Name 09/27/24 0910          General Information    Existing Precautions/Restrictions fall;oxygen therapy device and L/min  6L hiflow, increased to 8L for activity  -WK       Row Name 09/27/24 0910          Pain    Pretreatment Pain Rating 0/10 - no pain  -WK     Posttreatment Pain Rating 1/10  -WK       Row Name 09/27/24 0910          Bed Mobility    Bed Mobility scooting/bridging  -WK     Scooting/Bridging [place] (Bed Mobility) standby assist;verbal cues  -WK     Supine-Sit [place] (Bed Mobility) standby assist  -WK     Sit-Supine [place] (Bed Mobility) standby assist  -WK       Row Name 09/27/24 0910          Sit-Stand Transfer    Sit-Stand [place] (Transfers) standby assist  -WK       Row Name 09/27/24 0910          Gait/Stairs (Locomotion)    [place] Level (Gait) verbal cues;contact guard;minimum assist (75% patient effort)  -WK     Distance in Feet (Gait) 25  x2 sitting rest  -WK     Deviations/Abnormal Patterns (Gait) gait speed decreased  -WK      Bilateral Gait Deviations forward flexed posture  -WK     Comment, (Gait/Stairs) mild unsteadiness during turn with no HHA.  HHA on 2nd walk and this improved to CGA.  -WK       Row Name 09/27/24 0910          Balance    Balance Assessment sitting static balance  -WK     Static Sitting Balance supervision  -WK     Dynamic Sitting Balance supervision  -WK     Position, Sitting Balance unsupported;sitting edge of bed  -WK     Comment, Balance Educated on breathing technique.  -WK       Row Name 09/27/24 0910          Plan of Care Review    Plan of Care Reviewed With patient  -WK     Progress improving  -WK     Outcome Evaluation Bed mobility SBA. Sit to stand CGA. Amb 25' Maty-CGA  with mild unsteadiness during turns. Educated on breathing technique. Continue to required increase in time to allow o2 to increase. 85-87% during sitting rest and 90-92% after recovery. Pt amb on 8L and placed back on 6L once back in bed.  -WK       Row Name 09/27/24 0910          Positioning and Restraints    Pre-Treatment Position in bed  -WK     Post Treatment Position bed  -WK     In Bed fowlers;call light within reach;encouraged to call for assist;exit alarm on  refused chair due to causing increase in breathing difficulty.  -WK               User Key  (r) = Recorded By, (t) = Taken By, (c) = Cosigned By      Initials Name Provider Type    WK Elvira Becerra PTA Physical Therapist Assistant                    Physical Therapy Education       Title: PT OT SLP Therapies (In Progress)       Topic: Physical Therapy (In Progress)       Point: Mobility training (Done)       Learning Progress Summary             Patient Acceptance, E,TB, VU by STEVEN at 9/25/2024 0755    Acceptance, E,TB, VU by STEVEN at 9/24/2024 0745    Acceptance, E, VU,DU,NR by LEIDY at 9/20/2024 1400    Comment: benefits of activity, progression of PT POC                         Point: Home exercise program (Not Started)       Learner Progress:  Not documented in this visit.               Point: Body mechanics (Not Started)       Learner Progress:  Not documented in this visit.              Point: Precautions (Not Started)       Learner Progress:  Not documented in this visit.                              User Key       Initials Effective Dates Name Provider Type Discipline    LEIDY 02/03/23 -  Cristian Hirsch PT DPT Physical Therapist PT     06/19/24 -  Rosa Aiken, RN Registered Nurse Nurse                  PT Recommendation and Plan     Plan of Care Reviewed With: patient  Progress: improving  Outcome Evaluation: Bed mobility SBA. Sit to stand CGA. Amb 25' Maty-CGA  with mild unsteadiness during turns. Educated on breathing technique. Continue to required increase in time to allow o2 to increase. 85-87% during sitting rest and 90-92% after recovery. Pt amb on 8L and placed back on 6L once back in bed.   Outcome Measures       Row Name 09/27/24 0910             How much help from another person do you currently need...    Turning from your back to your side while in flat bed without using bedrails? 4  -WK      Moving from lying on back to sitting on the side of a flat bed without bedrails? 3  -WK      Moving to and from a bed to a chair (including a wheelchair)? 3  -WK      Standing up from a chair using your arms (e.g., wheelchair, bedside chair)? 3  -WK      Climbing 3-5 steps with a railing? 3  -WK      To walk in hospital room? 3  -WK      AM-PAC 6 Clicks Score (PT) 19  -WK      Highest Level of Mobility Goal 6 --> Walk 10 steps or more  -WK         Functional Assessment    Outcome Measure Options AM-PAC 6 Clicks Basic Mobility (PT)  -WK                User Key  (r) = Recorded By, (t) = Taken By, (c) = Cosigned By      Initials Name Provider Type    WK Elvira Becerra, PTA Physical Therapist Assistant                     Time Calculation:    PT Charges       Row Name 09/27/24 1001             Time Calculation    Start Time 0910  -WK      Stop Time 0953  -WK      Time  Calculation (min) 43 min  -WK      PT Received On 09/27/24  -WK         Time Calculation- PT    Total Timed Code Minutes- PT 43 minute(s)  -WK         Timed Charges    40690 - Gait Training Minutes  43  -WK         Total Minutes    Timed Charges Total Minutes 43  -WK       Total Minutes 43  -WK                User Key  (r) = Recorded By, (t) = Taken By, (c) = Cosigned By      Initials Name Provider Type    WK Elvira Becerra PTA Physical Therapist Assistant                  Therapy Charges for Today       Code Description Service Date Service Provider Modifiers Qty    [id number] HC GAIT TRAINING EA 15 MIN 9/27/2024 Elvira Becerra PTA GP 3            PT G-Codes  Outcome Measure Options: AM-PAC 6 Clicks Basic Mobility (PT)  AM-PAC 6 Clicks Score (PT): 19    Elvira Becerra PTA  9/27/2024

## 2024-09-28 LAB
ANION GAP SERPL CALCULATED.3IONS-SCNC: 9 MMOL/L (ref 5–15)
BUN SERPL-MCNC: 45 MG/DL (ref 8–23)
BUN/CREAT SERPL: 22.4 (ref 7–25)
CALCIUM SPEC-SCNC: 9.9 MG/DL (ref 8.6–10.5)
CHLORIDE SERPL-SCNC: 106 MMOL/L (ref 98–107)
CO2 SERPL-SCNC: 26 MMOL/L (ref 22–29)
CREAT SERPL-MCNC: 2.01 MG/DL (ref 0.76–1.27)
EGFRCR SERPLBLD CKD-EPI 2021: 32.5 ML/MIN/1.73
GLUCOSE SERPL-MCNC: 119 MG/DL (ref 65–99)
POTASSIUM SERPL-SCNC: 4.3 MMOL/L (ref 3.5–5.2)
SODIUM SERPL-SCNC: 141 MMOL/L (ref 136–145)

## 2024-09-28 PROCEDURE — 80048 BASIC METABOLIC PNL TOTAL CA: CPT | Performed by: INTERNAL MEDICINE

## 2024-09-28 PROCEDURE — 97116 GAIT TRAINING THERAPY: CPT

## 2024-09-28 PROCEDURE — 94799 UNLISTED PULMONARY SVC/PX: CPT

## 2024-09-28 PROCEDURE — 99233 SBSQ HOSP IP/OBS HIGH 50: CPT | Performed by: INTERNAL MEDICINE

## 2024-09-28 PROCEDURE — 94664 DEMO&/EVAL PT USE INHALER: CPT

## 2024-09-28 PROCEDURE — 94761 N-INVAS EAR/PLS OXIMETRY MLT: CPT

## 2024-09-28 RX ADMIN — Medication 10 ML: at 08:31

## 2024-09-28 RX ADMIN — SODIUM BICARBONATE 650 MG: 650 TABLET ORAL at 20:04

## 2024-09-28 RX ADMIN — METOPROLOL SUCCINATE 25 MG: 25 TABLET, EXTENDED RELEASE ORAL at 08:31

## 2024-09-28 RX ADMIN — BUDESONIDE AND FORMOTEROL FUMARATE DIHYDRATE 2 PUFF: 160; 4.5 AEROSOL RESPIRATORY (INHALATION) at 07:06

## 2024-09-28 RX ADMIN — GUAIFENESIN 600 MG: 600 TABLET, EXTENDED RELEASE ORAL at 08:32

## 2024-09-28 RX ADMIN — Medication 10 ML: at 20:04

## 2024-09-28 RX ADMIN — PANTOPRAZOLE SODIUM 40 MG: 40 TABLET, DELAYED RELEASE ORAL at 08:31

## 2024-09-28 RX ADMIN — SODIUM BICARBONATE 650 MG: 650 TABLET ORAL at 16:18

## 2024-09-28 RX ADMIN — BUDESONIDE AND FORMOTEROL FUMARATE DIHYDRATE 2 PUFF: 160; 4.5 AEROSOL RESPIRATORY (INHALATION) at 20:18

## 2024-09-28 RX ADMIN — EMPAGLIFLOZIN 10 MG: 10 TABLET, FILM COATED ORAL at 08:31

## 2024-09-28 RX ADMIN — ASPIRIN 81 MG: 81 TABLET, COATED ORAL at 08:31

## 2024-09-28 RX ADMIN — ACETAMINOPHEN 1000 MG: 500 TABLET, FILM COATED ORAL at 20:04

## 2024-09-28 RX ADMIN — DOCUSATE SODIUM 50 MG AND SENNOSIDES 8.6 MG 2 TABLET: 8.6; 5 TABLET, FILM COATED ORAL at 16:21

## 2024-09-28 RX ADMIN — ALBUTEROL SULFATE 2 PUFF: 90 INHALANT RESPIRATORY (INHALATION) at 07:06

## 2024-09-28 RX ADMIN — SODIUM BICARBONATE 650 MG: 650 TABLET ORAL at 08:31

## 2024-09-28 RX ADMIN — ATORVASTATIN CALCIUM 20 MG: 10 TABLET, FILM COATED ORAL at 20:04

## 2024-09-28 RX ADMIN — GUAIFENESIN 600 MG: 600 TABLET, EXTENDED RELEASE ORAL at 20:04

## 2024-09-28 RX ADMIN — TAMSULOSIN HYDROCHLORIDE 0.4 MG: 0.4 CAPSULE ORAL at 20:04

## 2024-09-28 RX ADMIN — ALBUTEROL SULFATE 2 PUFF: 90 INHALANT RESPIRATORY (INHALATION) at 15:11

## 2024-09-28 RX ADMIN — ALBUTEROL SULFATE 2 PUFF: 90 INHALANT RESPIRATORY (INHALATION) at 20:18

## 2024-09-28 RX ADMIN — CETIRIZINE HYDROCHLORIDE 10 MG: 10 TABLET ORAL at 08:31

## 2024-09-28 RX ADMIN — FLUTICASONE PROPIONATE 2 SPRAY: 50 SPRAY, METERED NASAL at 08:31

## 2024-09-28 RX ADMIN — Medication 10 MG: at 20:04

## 2024-09-28 RX ADMIN — ISOSORBIDE MONONITRATE 30 MG: 30 TABLET, EXTENDED RELEASE ORAL at 11:56

## 2024-09-28 NOTE — PROGRESS NOTES
"    [facility] Medicine Services  INPATIENT PROGRESS NOTE    Patient Name: Karoline Prater  Date of Admission: 9/19/2024  Today's Date: 09/28/24  Length of Stay: 8  Primary Care Physician: Irving Alexis MD    Subjective   Chief Complaint: follow-up shortness of breath  HPI   Patient indicated that he was doing \"terrible\" when I saw him this morning.  He states that his main concern is that he feels dirty and wants a bath; wants to get cleaned up.  He denies any new shortness of breath symptoms.  He is currently on 6 L by nasal cannula and his O2 has to be increased to at least 8 L with any activity.  He reports that at home his O2 concentrator will not exceed 6 L.  He does report a cough productive of \"brown phlegm\" but reports that it is improving.  He states that he really needs to get home soon as possible as he has \"bills to pay.\"    Review of Systems   All pertinent negatives and positives are as above. All other systems have been reviewed and are negative unless otherwise stated.     Objective    Temp:  [97.7 °F (36.5 °C)-98.5 °F (36.9 °C)] 97.9 °F (36.6 °C)  Heart Rate:  [68-88] 69  Resp:  [17-18] 18  BP: (112-127)/(57-79) 122/69  Physical Exam  Vitals reviewed.   Constitutional:       General: He is not in acute distress.     Appearance: He is not ill-appearing.   HENT:      Head: Normocephalic.      Mouth/Throat:      Mouth: Mucous membranes are moist.   Eyes:      Pupils: Pupils are equal, round, and reactive to light.   Cardiovascular:      Rate and Rhythm: Normal rate.   Pulmonary:      Effort: Pulmonary effort is normal.      Comments: Scattered crackly BSs noted bilaterally; no prominent wheezing; on 6LNC during my assessment  Abdominal:      Palpations: Abdomen is soft.   Musculoskeletal:         General: No deformity.   Skin:     General: Skin is warm.   Neurological:      Mental Status: He is alert.      Motor: Weakness present.   Psychiatric:         " Mood and Affect: Mood normal.       Results Review:  I have reviewed the labs, radiology results, and diagnostic studies.    Laboratory Data:   Results from last 7 days   Lab Units 09/26/24  0449 09/22/24  0404   WBC 10*3/mm3 8.52 7.22   HEMOGLOBIN g/dL 11.3* 11.7*   HEMATOCRIT % 37.0* 37.4*   PLATELETS 10*3/mm3 150 126*        Results from last 7 days   Lab Units 09/28/24  0221 09/27/24  0957 09/26/24  0449   SODIUM mmol/L 141 137 137   POTASSIUM mmol/L 4.3 4.0 3.7   CHLORIDE mmol/L 106 99 101   CO2 mmol/L 26.0 24.0 25.0   BUN mg/dL 45* 47* 49*   CREATININE mg/dL 2.01* 2.18* 2.50*   CALCIUM mg/dL 9.9 10.4 9.9   GLUCOSE mg/dL 119* 242* 114*       Culture Data:   Respiratory Culture   Date Value Ref Range Status   09/21/2024 Scant growth (1+) Normal Respiratory Sherrie  Final       Radiology Data:   Imaging Results (Last 24 Hours)       ** No results found for the last 24 hours. **            I have reviewed the patient's current medications.     Assessment/Plan   Assessment  Active Hospital Problems    Diagnosis     **Acute on chronic respiratory failure with hypoxia     Acute-on-chronic respiratory failure     Pneumonitis     Bacterial pneumonia     COVID-19 virus infection     Chronic heart failure with preserved ejection fraction (HFpEF)     COPD (chronic obstructive pulmonary disease)     A-fib     Pulmonary fibrosis     Stage 3b chronic kidney disease     Malignant neoplasm of upper lobe of right lung     Essential hypertension        Treatment Plan  Patient on 6LNC this morning (patient on 3LNC chronically in the outpatient setting).  Still requiring 8LNC with activity  Will plan for walking oximetry study in AM tomorrow.  Patient informed me this morning that his current outpatient 02 won't be able to exceed 6 liters.  Patient finished 7 days of IV Cefepime  Pulmonary following and appreciate their assistance  Diuretics on hold - favorable trend in creatinine.  Plan to repeat BMP in AM  Recent CXR with findings  of interstitial fibrosis; recent CT scan of the chest also revealed a spiculated masslike lesion involving the right upper lobe.      7.  Patient initially tested positive for COVID-19 on 9/5/2024.  He was subsequently hospitalized from 9/5 through 9/8.  He was discharged home with prescription for dexamethasone-completed a full course of dexamethasone in the outpatient setting.  He also received 2 days of remdesivir during his previous hospitalization per discharge summary on 9/8/2024.  Enhanced contact and droplet precautions now removed  8.  Continue albuterol and Atrovent HFA  9.  Continue Symbicort  10.  Continue incentive spirometry and flutter valve  11.  PT and OT; patient indicated that he lives at home alone.  He states that he has a brother that lives a couple of miles away from him.  He does report that he needs to get home soon as he has bills to pay.  As noted above, plan for walking oximetry study in AM tomorrow but my current worry is his O2 requirement particularly with exertion.  12.  I'm okay with dc'ing telemetry today    Medical Decision Making  Number and Complexity of problems: 2 acute; moderate complexity      Conditions and Status        Condition is unchanged.     [facility] Data  External documents reviewed: none  Cardiac tracing (EKG, telemetry) interpretation: no new EKGs this AM  Radiology interpretation: As above  Labs reviewed: as above  Any tests that were considered but not ordered: none     Decision rules/scores evaluated (example [id number], Wells, etc): none     Discussed with: patient     Care Planning  Shared decision making: Discussed with patient with agreement to proceed with treatment plan as outlined  Code status and discussions: Full code    Disposition  Social Determinants of Health that impact treatment or disposition: None apparent at this time; patient does live at home alone  I expect the patient to be discharged to home with home health (possibly) when 02 requirement has  improved (still requiring 8LNC with even minimal activity).    Electronically signed by Prasanth Stewart MD, 09/28/24, 09:43 CDT.

## 2024-09-28 NOTE — PROGRESS NOTES
[facility] PULMONARY PROGRESS NOTE - [facility]    Patient: Karoline Prater  1940   MR# [id number]   Acct# [id number]  09/28/24   13:06 CDT  Referring Provider: Prasanth Stewart MD    Chief Complaint: COVID    Interval history: The patient is very hard of hearing.  He is resting in bed on 6 L nasal cannula, O2 sat 91%.  Baseline oxygen is 2.5 L.  The patient could likely discharge home if 10L concentrator could be established for home use.  A.m. labs reviewed.  No new chest x-ray to review this morning.  He is afebrile.  Further orders per Dr. Crocker.  Meds:  acetaminophen, 1,000 mg, Oral, Nightly  albuterol sulfate HFA, 2 puff, Inhalation, TID - RT   And  ipratropium, 2 puff, Inhalation, TID - RT  aspirin, 81 mg, Oral, Daily  atorvastatin, 20 mg, Oral, Nightly  budesonide-formoterol, 2 puff, Inhalation, BID - RT  [Held by provider] bumetanide, 1 mg, Intravenous, Daily  cetirizine, 10 mg, Oral, Daily  empagliflozin, 10 mg, Oral, Daily  fluticasone, 2 spray, Each Nare, Daily  guaiFENesin, 600 mg, Oral, Q12H  isosorbide mononitrate, 30 mg, Oral, Daily Before Lunch  melatonin, 10 mg, Oral, Nightly  metoprolol succinate XL, 25 mg, Oral, Daily  pantoprazole, 40 mg, Oral, Daily  sodium bicarbonate, 650 mg, Oral, TID  sodium chloride, 10 mL, Intravenous, Q12H  tamsulosin, 0.4 mg, Oral, Nightly           Physical Exam:  SpO2 Percentage    09/28/24 1158 09/28/24 1201 09/28/24 1202   SpO2: 91% 93% 93%     Body mass index is 27.05 kg/m².   Temp:  [97.9 °F (36.6 °C)-98.5 °F (36.9 °C)] 98.1 °F (36.7 °C)  Heart Rate:  [68-88] 88  Resp:  [17-18] 18  BP: (112-137)/(57-79) 137/65  Intake/Output Summary (Last 24 hours) at 9/28/2024 1307  Last data filed at 9/28/2024 0252  Gross per 24 hour   Intake 350 ml   Output 625 ml   Net -275 ml     Physical Exam  Vitals reviewed.   Constitutional:       General: He is not in acute distress.     Appearance: He is well-developed.      Interventions: Nasal cannula in  place.   HENT:      Head: Normocephalic and atraumatic.   Eyes:      General: No scleral icterus.     Conjunctiva/sclera: Conjunctivae normal.      Pupils: Pupils are equal, round, and reactive to light.   Cardiovascular:      Rate and Rhythm: Normal rate.   Pulmonary:      Effort: Pulmonary effort is normal. No respiratory distress.      Breath sounds: Decreased breath sounds present. No wheezing or rales.   Musculoskeletal:         General: Normal range of motion.      Cervical back: Normal range of motion and neck supple.   Skin:     General: Skin is warm and dry.   Neurological:      Mental Status: He is alert and oriented to person, place, and time.   Psychiatric:         Behavior: Behavior normal.         Thought Content: Thought content normal.         Judgment: Judgment normal.     Electronically signed by MARITZA Pathak, 9/28/2024, 13:07 CDT      Physician substantive portion: medical decision making    Physician substantive portion: medical decision making  Result Review  Laboratory Data:  Results from last 7 days   Lab Units 09/26/24  0449 09/22/24  0404   WBC 10*3/mm3 8.52 7.22   HEMOGLOBIN g/dL 11.3* 11.7*   PLATELETS 10*3/mm3 150 126*     Results from last 7 days   Lab Units 09/28/24  0221 09/27/24  0957 09/26/24  0449   SODIUM mmol/L 141 137 137   POTASSIUM mmol/L 4.3 4.0 3.7   CO2 mmol/L 26.0 24.0 25.0   BUN mg/dL 45* 47* 49*   CREATININE mg/dL 2.01* 2.18* 2.50*   CRP mg/dL  --  10.32*  --          Microbiology Results (last 10 days)       Procedure Component Value - Date/Time    MRSA Screen, PCR (Inpatient) - Swab, Nares [[id number]]  (Normal) Collected: 09/21/24 1300    Lab Status: Final result Specimen: Swab from Nares Updated: 09/21/24 1431     MRSA PCR No MRSA Detected    Narrative:      The negative predictive value of this diagnostic test is high and should only be used to consider de-escalating anti-MRSA therapy. A positive result may indicate colonization with MRSA and must be  correlated clinically.    Respiratory Culture - Sputum, Cough [[id number]] Collected: 09/21/24 0047    Lab Status: Final result Specimen: Sputum from Cough Updated: 09/23/24 1032     Respiratory Culture Scant growth (1+) Normal Respiratory Nya     Gram Stain Moderate (3+) WBCs per low power field      Rare (1+) Epithelial cells per low power field      Few (2+) Mixed gram positive nya    Blood Culture - Blood, Arm, Left [[id number]]  (Normal) Collected: 09/19/24 2025    Lab Status: Final result Specimen: Blood from Arm, Left Updated: 09/24/24 2100     Blood Culture No growth at 5 days    Respiratory Panel PCR w/COVID-19(SARS-CoV-2) JORDYN/MATILDE/BRYAN/PAD/COR/ASHER In-House, NP Swab in UTM/VTM, 2 HR TAT - Swab, Nasopharynx [[id number]]  (Abnormal) Collected: 09/19/24 1944    Lab Status: Final result Specimen: Swab from Nasopharynx Updated: 09/19/24 2113     ADENOVIRUS, PCR Not Detected     Coronavirus 229E Not Detected     Coronavirus HKU1 Not Detected     Coronavirus NL63 Not Detected     Coronavirus OC43 Not Detected     COVID19 Detected     Human Metapneumovirus Not Detected     Human Rhinovirus/Enterovirus Not Detected     Influenza A PCR Not Detected     Influenza B PCR Not Detected     Parainfluenza Virus 1 Not Detected     Parainfluenza Virus 2 Not Detected     Parainfluenza Virus 3 Not Detected     Parainfluenza Virus 4 Not Detected     RSV, PCR Not Detected     Bordetella pertussis pcr Not Detected     Bordetella parapertussis PCR Not Detected     Chlamydophila pneumoniae PCR Not Detected     Mycoplasma pneumo by PCR Not Detected    Narrative:      In the setting of a positive respiratory panel with a viral infection PLUS a negative procalcitonin without other underlying concern for bacterial infection, consider observing off antibiotics or discontinuation of antibiotics and continue supportive care. If the respiratory panel is positive for atypical bacterial infection (Bordetella pertussis, Chlamydophila  pneumoniae, or Mycoplasma pneumoniae), consider antibiotic de-escalation to target atypical bacterial infection.    Urine Culture - Urine, Urine, Clean Catch [[id number]]  (Abnormal)  (Susceptibility) Collected: 09/19/24 1943    Lab Status: Final result Specimen: Urine, Clean Catch Updated: 09/22/24 1209     Urine Culture >100,000 CFU/mL Citrobacter amalonaticus    Narrative:      Colonization of the urinary tract without infection is common. Treatment is discouraged unless the patient is symptomatic, pregnant, or undergoing an invasive urologic procedure.    Susceptibility        Citrobacter amalonaticus      BRANDIN      Amoxicillin + Clavulanate Resistant      Cefazolin Resistant      Cefepime Susceptible      Ceftazidime Susceptible      Ceftriaxone Susceptible      Gentamicin Susceptible      Levofloxacin Susceptible      Nitrofurantoin Intermediate      Piperacillin + Tazobactam Susceptible      Trimethoprim + Sulfamethoxazole Susceptible                           Blood Culture - Blood, Arm, Left [[id number]]  (Normal) Collected: 09/19/24 1928    Lab Status: Final result Specimen: Blood from Arm, Left Updated: 09/24/24 2000     Blood Culture No growth at 5 days           Recent films:  XR Chest 1 View    Result Date: 9/26/2024  EXAMINATION: XR CHEST 1 VW-  9/26/2024 9:52 PM  HISTORY: COVID-pneumonia.  FINDINGS: Today's exam is compared to previous study of 1 week earlier. A tunneled infusion cath remains in place with the tip in the SVC. Diffuse interstitial changes are noted of the lungs which appear to be somewhat chronic in nature. There is blunting of the right lateral costophrenic angle suggesting either pleural thickening or a small effusion. The heart is enlarged. A leadless pacer projects over the left heart border.      Impression: 1.. Increased interstitial markings which appear to be chronic in nature and may represent an element of interstitial fibrosis. There is chronic blunting of the right lateral  costophrenic angle. 2. No acute infiltrate or free air.  This report was signed and finalized on 9/26/2024 9:53 PM by Dr. Naveed Reynolds MD.      Personal review of imaging : CXR shows chest x-ray showed chronic changes no new chest x-ray done today.  Pulmonary interstitial fibrosis noted with blunting of the right costophrenic angle.      Pulmonary Assessment:  Acute on chronic hypoxic respiratory failure  Dependence on supplemental oxygen  Bilateral pulm infiltrate/post-COVID syndrome  Secondary bacterial pneumonia  History of interstitial lung disease  Chronic congestive diastolic heart failure with preserved EF.  Chronic obstructive pulmonary disease.  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   He appears comfortable on 7 L of oxygen.  He is normally on 3 L at home he is off COVID isolation now.  We will request the case management to get a oxygen concentrator 10 L at home.    He currently has a 6 L oxygen concentrator home  If his oxygen could be titrated down to 6 L he could be discharged home and can follow-up with me as outpatient.  Patient completed antibiotics and dexamethasone and already treated with COVID medications.  Keep titrating oxygen and keep saturation more than 92%.  Continue on albuterol HFA and  Chest x-ray shows interstitial changes from chronic lung disease with superimposed infection or pneumonia  DVT and stress ulcer prophylaxis.  Pain and anxiety control.  Nutritional support.  Repeat labs and x-rays from time to time.  Continue COVID isolation  Patient did complete course of dexamethasone.  Currently not on any steroid.  Patient is known to me and I will follow him as an outpatient after discharge  Continue him on Flonase and Zyrtec for nasal allergy.  Repeat labs and imaging studies and  monitor inflammatory markers from time to time.  CODE STATUS: Full..  Overall prognosis: Guarded.  We will continue following.  He is at his baseline anticipate discharge this week  We will follow.    Time spent by me: 35 min    This visit was performed by both a physician and an [facility] RN.  I performed all aspects of the medical decision making as documented.    Electronically signed by     Tarik Crocker MD,  Pulmonologist/Intensivist   9/28/2024, 15:04 CDT

## 2024-09-28 NOTE — THERAPY TREATMENT NOTE
Acute Care - Physical Therapy Treatment Note  [facility]     Patient Name: Karoline Prater  : 1940  MRN: [id number]  Today's Date: 2024      Visit Dx:     ICD-10-CM ICD-9-CM   1. Hypoxia  R09.02 799.02   2. Pneumonitis  J98.4 486   3. Acute UTI  N39.0 599.0   4. Impaired mobility [Z74.09]  Z74.09 799.89     Patient Active Problem List   Diagnosis    Dyspnea    Essential hypertension    NSVT (nonsustained ventricular tachycardia)    Malignant neoplasm of upper lobe of right lung    Stage 3b chronic kidney disease    Pancytopenia due to antineoplastic chemotherapy    Pneumonia due to infectious organism    Function kidney decreased    Cellulitis    Acute on chronic respiratory failure with hypoxia    Pulmonary fibrosis    Macrocytic anemia    Thrombocytopenia    Sepsis    Right pneumothorax    Hyperkalemia    Acute kidney injury superimposed on CKD stage 3b    GERD without esophagitis    A-fib    Former smoker    Chest pain    Tachycardia    Bilateral lower extremity edema    Metastatic adenocarcinoma of the RUL    Chronic diastolic congestive heart failure    COPD (chronic obstructive pulmonary disease)    S/P radiation > 12 weeks    CHF (congestive heart failure)    History of radiation therapy    Chronic heart failure with preserved ejection fraction (HFpEF)    Narrow complex tachycardia    Atrial flutter    Encounter for examination for normal comparison and control in clinical research program    CHF exacerbation    COVID-19 virus infection    Cytokine release syndrome, grade 2    Bacterial pneumonia    Hyperlipidemia    Coronary artery disease    Pneumonitis    Acute-on-chronic respiratory failure     Past Medical History:   Diagnosis Date    Arthritis     Atrial fibrillation with rapid ventricular response 2019    Blindness of left eye     BPH with obstruction/lower urinary tract symptoms     Cancer     stomach & lung    CHF (congestive heart failure)     CKD (chronic kidney disease)  stage 3, GFR 30-59 ml/min     COPD with acute exacerbation 08/03/2024    Coronary artery disease     Elevated cholesterol     Essential hypertension 10/02/2017    Fibrotic lung diseases     GERD (gastroesophageal reflux disease)     Hearing loss     History of transfusion     Hydronephrosis of left kidney     Hyperlipidemia     Hypertension     pt was taken off of all of his medications for BP (atenolol, lisinopril, lasix) because his BP kept bottoming out so his primary dr told him to discontinue them 1-2 months ago (Jan/Feb 2019). pt states he takes no medications currently.    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 Patel is just going to keep an eye on it and not do surgery right now.    Mediastinal adenopathy 10/24/2018    Station [facility]    Monoclonal gammopathy of unknown significance (MGUS) 09/11/2018    Pancreatic mass     pt states he had this in 2013 but it went away on its own. Now recent CT shows it has come back so he is going to have an ultrasound on 3/13/19.    Shortness of breath      Past Surgical History:   Procedure Laterality Date    ABDOMINAL SURGERY      BRONCHOSCOPY N/A 10/24/2018    Procedure: BRONCHOSCOPY WITH BIOPSY, EBUS;  Surgeon: Gareth Becerra MD;  Location: [facility] OR;  Service: Pulmonary    CHOLECYSTECTOMY      COLONOSCOPY N/A 1/3/2019    Procedure: COLONOSCOPY WITH ANESTHESIA;  Surgeon: Randy Somers DO;  Location: [facility] ENDOSCOPY;  Service: Gastroenterology    CYSTOSCOPY, RETROGRADE PYELOGRAM AND STENT INSERTION Left 3/8/2019    Procedure: CYSTOSCOPY RETROGRADE BILATERAL PYELOGRAM;  Surgeon: Jos Sylvester MD;  Location: [facility] OR;  Service: Urology    ENDOSCOPY N/A 12/11/2018    Procedure: ESOPHAGOGASTRODUODENOSCOPY WITH ANESTHESIA;  Surgeon: Randy Somers DO;  Location: [facility] ENDOSCOPY;  Service:  Gastroenterology    ENDOSCOPY N/A 4/29/2019    Procedure: ESOPHAGOGASTRODUODENOSCOPY WITH ANESTHESIA;  Surgeon: Lilliam Jj MD;  Location: [facility] OR;  Service: Gastroenterology    ENDOSCOPY N/A 5/9/2019    Procedure: ESOPHAGOGASTRODUODENOSCOPY WITH ANESTHESIA;  Surgeon: Pilo Bansal MD;  Location: [facility] ENDOSCOPY;  Service: Gastroenterology    EYE SURGERY Left 1964    FOOT SURGERY Right 1966    joint    FRACTURE SURGERY      PACEMAKER IMPLANTATION Left 8/29/2024    Procedure: Leadless pacemaker implant and AVN ablation;  Surgeon: Carlitos Bowen MD;  Location: [facility] CATH INVASIVE LOCATION;  Service: Cardiovascular;  Laterality: Left;     PT Assessment (Last 12 Hours)       PT Evaluation and Treatment       Row Name 09/28/24 1048          Physical Therapy Time and Intention    Subjective Information --  states he wants a bath. nsg is aware  -WK     Document Type therapy note (daily note)  -WK     Mode of Treatment physical therapy  -WK     Comment required some encouragement to participate  -WK       Row Name 09/28/24 1048          General Information    Existing Precautions/Restrictions fall;oxygen therapy device and L/min  6L hiflow, increased to 8L for activity  -WK       Row Name 09/28/24 1048          Bed Mobility    Scooting/Bridging [place] (Bed Mobility) standby assist  -WK     Supine-Sit [place] (Bed Mobility) standby assist  -WK     Sit-Supine [place] (Bed Mobility) standby assist  -WK       Row Name 09/28/24 1048          Sit-Stand Transfer    Sit-Stand [place] (Transfers) standby assist  stood x 3  -WK       Row Name 09/28/24 1048          Gait/Stairs (Locomotion)    [place] Level (Gait) verbal cues;contact guard;minimum assist (75% patient effort)  -WK     Distance in Feet (Gait) 20  x2  -WK     Deviations/Abnormal Patterns (Gait) gait speed decreased  -WK     Bilateral Gait Deviations forward flexed posture  -WK     Comment, (Gait/Stairs) required increase in time to  recover today. o2 82-85% on 8L during sititng rest. 90% pre, 92 post.  -WK       Row Name 09/28/24 1048          Balance    Static Sitting Balance supervision  -WK     Dynamic Sitting Balance supervision  -WK     Dynamic Standing Balance contact guard  -WK       Row Name 09/28/24 1048          Plan of Care Review    Plan of Care Reviewed With patient  -WK     Outcome Evaluation O2 sat 90%. pretreamtnet. Bed mobility SBA. Sit to stand CGA. Amb 20' CGA-min A on 8l o2. dest 85-85% with increase time to recover with cues for breathing technique. Pt amb addition 20' with mild LOB during turn. Pt is limited pt his o2 and decrease endurance.  -WK       Row Name 09/28/24 1048          Positioning and Restraints    Pre-Treatment Position in bed  -WK     Post Treatment Position bed  -WK     In Bed fowlers;call light within reach;encouraged to call for assist  decline chair due to getting bath soon  -WK               User Key  (r) = Recorded By, (t) = Taken By, (c) = Cosigned By      Initials Name Provider Type    Elvira Neves PTA Physical Therapist Assistant                    Physical Therapy Education       Title: PT OT SLP Therapies (In Progress)       Topic: Physical Therapy (In Progress)       Point: Mobility training (Done)       Learning Progress Summary             Patient Acceptance, E,TB, VU by STEVEN at 9/25/2024 0755    Acceptance, E,TB, VU by STEVEN at 9/24/2024 0745    Acceptance, E, VU,DU,NR by LEIDY at 9/20/2024 1400    Comment: benefits of activity, progression of PT POC                         Point: Home exercise program (Not Started)       Learner Progress:  Not documented in this visit.              Point: Body mechanics (Not Started)       Learner Progress:  Not documented in this visit.              Point: Precautions (Not Started)       Learner Progress:  Not documented in this visit.                              User Key       Initials Effective Dates Name Provider Type Maria Eugenia FORBES 02/03/23 -   Cristian Hirsch, PT DPT Physical Therapist PT     06/19/24 -  Rosa Aiken, RN Registered Nurse Nurse                  PT Recommendation and Plan     Plan of Care Reviewed With: patient  Progress: improving  Outcome Evaluation: O2 sat 90%. pretreamtnet. Bed mobility SBA. Sit to stand CGA. Amb 20' CGA-min A on 8l o2. dest 85-85% with increase time to recover with cues for breathing technique. Pt amb addition 20' with mild LOB during turn. Pt is limited pt his o2 and decrease endurance.   Outcome Measures       Row Name 09/27/24 0910             How much help from another person do you currently need...    Turning from your back to your side while in flat bed without using bedrails? 4  -WK      Moving from lying on back to sitting on the side of a flat bed without bedrails? 3  -WK      Moving to and from a bed to a chair (including a wheelchair)? 3  -WK      Standing up from a chair using your arms (e.g., wheelchair, bedside chair)? 3  -WK      Climbing 3-5 steps with a railing? 3  -WK      To walk in hospital room? 3  -WK      AM-PAC 6 Clicks Score (PT) 19  -WK      Highest Level of Mobility Goal 6 --> Walk 10 steps or more  -WK         Functional Assessment    Outcome Measure Options AM-PAC 6 Clicks Basic Mobility (PT)  -WK                User Key  (r) = Recorded By, (t) = Taken By, (c) = Cosigned By      Initials Name Provider Type    WK Elvira Becerra PTA Physical Therapist Assistant                     Time Calculation:    PT Charges       Row Name 09/28/24 1139             Time Calculation    Start Time 1048  -WK      Stop Time 1131  -WK      Time Calculation (min) 43 min  -WK      PT Received On 09/28/24  -WK         Time Calculation- PT    Total Timed Code Minutes- PT 43 minute(s)  -WK         Timed Charges    32918 - Gait Training Minutes  43  -WK         Total Minutes    Timed Charges Total Minutes 43  -WK       Total Minutes 43  -WK                User Key  (r) = Recorded By, (t) = Taken By,  (c) = Cosigned By      Initials Name Provider Type    WK Elvira Becerra PTA Physical Therapist Assistant                  Therapy Charges for Today       Code Description Service Date Service Provider Modifiers Qty    95412470803 HC GAIT TRAINING EA 15 MIN 9/27/2024 Elvira Becerra PTA GP 3    39234298251 HC GAIT TRAINING EA 15 MIN 9/28/2024 Elvira Becerra PTA GP 3            PT G-Codes  Outcome Measure Options: AM-PAC 6 Clicks Basic Mobility (PT)  AM-PAC 6 Clicks Score (PT): 19    Elvira Becerra PTA  9/28/2024

## 2024-09-28 NOTE — PLAN OF CARE
Goal Outcome Evaluation:  Plan of Care Reviewed With: patient           Outcome Evaluation: VSS AOX4 No c/o pain. SOB with activity O2 increased for therapy and bathing. O2 7L high flow sat's 93-95% at rest. Desat's easily. Plan for case management to check with O2 company for higher concentrator at home.  68-92 per tele.

## 2024-09-28 NOTE — PLAN OF CARE
Problem: Adult Inpatient Plan of Care  Goal: Plan of Care Review  Outcome: Progressing  Goal: Patient-Specific Goal (Individualized)  Outcome: Progressing  Goal: Absence of Hospital-Acquired Illness or Injury  Outcome: Progressing  Intervention: Identify and Manage Fall Risk  Recent Flowsheet Documentation  Taken 9/27/2024 2114 by Ashley Jo RN  Safety Promotion/Fall Prevention: safety round/check completed  Intervention: Prevent Skin Injury  Recent Flowsheet Documentation  Taken 9/27/2024 2114 by Ashley Jo RN  Body Position: supine  Intervention: Prevent and Manage VTE (Venous Thromboembolism) Risk  Recent Flowsheet Documentation  Taken 9/27/2024 2114 by Ashley Jo RN  VTE Prevention/Management: patient refused intervention  Goal: Optimal Comfort and Wellbeing  Outcome: Progressing  Intervention: Provide Person-Centered Care  Recent Flowsheet Documentation  Taken 9/27/2024 2114 by Ashley Jo RN  Trust Relationship/Rapport: care explained  Goal: Readiness for Transition of Care  Outcome: Progressing     Problem: Asthma Comorbidity  Goal: Maintenance of Asthma Control  Outcome: Progressing     Problem: Behavioral Health Comorbidity  Goal: Maintenance of Behavioral Health Symptom Control  Outcome: Progressing     Problem: COPD (Chronic Obstructive Pulmonary Disease) Comorbidity  Goal: Maintenance of COPD Symptom Control  Outcome: Progressing     Problem: Diabetes Comorbidity  Goal: Blood Glucose Level Within Targeted Range  Outcome: Progressing     Problem: Heart Failure Comorbidity  Goal: Maintenance of Heart Failure Symptom Control  Outcome: Progressing     Problem: Hypertension Comorbidity  Goal: Blood Pressure in Desired Range  Outcome: Progressing     Problem: Obstructive Sleep Apnea Risk or Actual Comorbidity Management  Goal: Unobstructed Breathing During Sleep  Outcome: Progressing     Problem: Osteoarthritis Comorbidity  Goal: Maintenance of Osteoarthritis  Symptom Control  Outcome: Progressing     Problem: Pain Chronic (Persistent) (Comorbidity Management)  Goal: Acceptable Pain Control and Functional Ability  Outcome: Progressing     Problem: Seizure Disorder Comorbidity  Goal: Maintenance of Seizure Control  Outcome: Progressing     Problem: Adjustment to Illness (Sepsis/Septic Shock)  Goal: Optimal Coping  Outcome: Progressing     Problem: Bleeding (Sepsis/Septic Shock)  Goal: Absence of Bleeding  Outcome: Progressing     Problem: Glycemic Control Impaired (Sepsis/Septic Shock)  Goal: Blood Glucose Level Within Desired Range  Outcome: Progressing     Problem: Infection Progression (Sepsis/Septic Shock)  Goal: Absence of Infection Signs and Symptoms  Outcome: Progressing     Problem: Nutrition Impaired (Sepsis/Septic Shock)  Goal: Optimal Nutrition Intake  Outcome: Progressing     Problem: Fall Injury Risk  Goal: Absence of Fall and Fall-Related Injury  Outcome: Progressing  Intervention: Promote Injury-Free Environment  Recent Flowsheet Documentation  Taken 9/27/2024 2114 by Ashley Jo, RN  Safety Promotion/Fall Prevention: safety round/check completed     Problem: Breathing Pattern Ineffective  Goal: Effective Breathing Pattern  Outcome: Progressing  Intervention: Promote Improved Breathing Pattern  Recent Flowsheet Documentation  Taken 9/27/2024 2114 by Ashley Jo RN  Head of Bed (HOB) Positioning: HOB elevated

## 2024-09-28 NOTE — PLAN OF CARE
Goal Outcome Evaluation:  Plan of Care Reviewed With: patient        Progress: improving  Outcome Evaluation: O2 sat 90%. pretreatment. Bed mobility SBA. Sit to stand CGA. Amb 20' CGA-min A on 8l o2. dest 85-85% with increase time to recover with cues for breathing technique. Pt amb addition 20' with mild LOB during turn. Pt is limited pt his o2 and decrease endurance.

## 2024-09-29 LAB
ANION GAP SERPL CALCULATED.3IONS-SCNC: 12 MMOL/L (ref 5–15)
BUN SERPL-MCNC: 42 MG/DL (ref 8–23)
BUN/CREAT SERPL: 20.1 (ref 7–25)
CALCIUM SPEC-SCNC: 10.2 MG/DL (ref 8.6–10.5)
CHLORIDE SERPL-SCNC: 103 MMOL/L (ref 98–107)
CO2 SERPL-SCNC: 25 MMOL/L (ref 22–29)
CREAT SERPL-MCNC: 2.09 MG/DL (ref 0.76–1.27)
DEPRECATED RDW RBC AUTO: 60.2 FL (ref 37–54)
EGFRCR SERPLBLD CKD-EPI 2021: 31 ML/MIN/1.73
ERYTHROCYTE [DISTWIDTH] IN BLOOD BY AUTOMATED COUNT: 17.2 % (ref 12.3–15.4)
GLUCOSE SERPL-MCNC: 103 MG/DL (ref 65–99)
HCT VFR BLD AUTO: 37.1 % (ref 37.5–51)
HGB BLD-MCNC: 10.9 G/DL (ref 13–17.7)
MCH RBC QN AUTO: 28.3 PG (ref 26.6–33)
MCHC RBC AUTO-ENTMCNC: 29.4 G/DL (ref 31.5–35.7)
MCV RBC AUTO: 96.4 FL (ref 79–97)
PLATELET # BLD AUTO: 159 10*3/MM3 (ref 140–450)
PMV BLD AUTO: 12.2 FL (ref 6–12)
POTASSIUM SERPL-SCNC: 4.2 MMOL/L (ref 3.5–5.2)
QT INTERVAL: 466 MS
QTC INTERVAL: 466 MS
RBC # BLD AUTO: 3.85 10*6/MM3 (ref 4.14–5.8)
SODIUM SERPL-SCNC: 140 MMOL/L (ref 136–145)
WBC NRBC COR # BLD AUTO: 7 10*3/MM3 (ref 3.4–10.8)

## 2024-09-29 PROCEDURE — 80048 BASIC METABOLIC PNL TOTAL CA: CPT | Performed by: INTERNAL MEDICINE

## 2024-09-29 PROCEDURE — 85027 COMPLETE CBC AUTOMATED: CPT | Performed by: INTERNAL MEDICINE

## 2024-09-29 PROCEDURE — 94799 UNLISTED PULMONARY SVC/PX: CPT

## 2024-09-29 PROCEDURE — 94664 DEMO&/EVAL PT USE INHALER: CPT

## 2024-09-29 PROCEDURE — 99233 SBSQ HOSP IP/OBS HIGH 50: CPT | Performed by: INTERNAL MEDICINE

## 2024-09-29 PROCEDURE — 94761 N-INVAS EAR/PLS OXIMETRY MLT: CPT

## 2024-09-29 RX ORDER — IPRATROPIUM BROMIDE AND ALBUTEROL SULFATE 2.5; .5 MG/3ML; MG/3ML
3 SOLUTION RESPIRATORY (INHALATION)
Status: DISCONTINUED | OUTPATIENT
Start: 2024-09-29 | End: 2024-10-07 | Stop reason: HOSPADM

## 2024-09-29 RX ORDER — ECHINACEA PURPUREA EXTRACT 125 MG
2 TABLET ORAL AS NEEDED
Status: DISCONTINUED | OUTPATIENT
Start: 2024-09-29 | End: 2024-09-30

## 2024-09-29 RX ADMIN — PANTOPRAZOLE SODIUM 40 MG: 40 TABLET, DELAYED RELEASE ORAL at 08:20

## 2024-09-29 RX ADMIN — Medication 10 MG: at 20:15

## 2024-09-29 RX ADMIN — Medication 10 ML: at 08:20

## 2024-09-29 RX ADMIN — IPRATROPIUM BROMIDE AND ALBUTEROL SULFATE 3 ML: .5; 3 SOLUTION RESPIRATORY (INHALATION) at 19:10

## 2024-09-29 RX ADMIN — ATORVASTATIN CALCIUM 20 MG: 10 TABLET, FILM COATED ORAL at 20:15

## 2024-09-29 RX ADMIN — METOPROLOL SUCCINATE 25 MG: 25 TABLET, EXTENDED RELEASE ORAL at 08:20

## 2024-09-29 RX ADMIN — FLUTICASONE PROPIONATE 2 SPRAY: 50 SPRAY, METERED NASAL at 08:20

## 2024-09-29 RX ADMIN — SODIUM BICARBONATE 650 MG: 650 TABLET ORAL at 17:17

## 2024-09-29 RX ADMIN — DOCUSATE SODIUM 50 MG AND SENNOSIDES 8.6 MG 2 TABLET: 8.6; 5 TABLET, FILM COATED ORAL at 17:21

## 2024-09-29 RX ADMIN — SODIUM BICARBONATE 650 MG: 650 TABLET ORAL at 08:20

## 2024-09-29 RX ADMIN — GUAIFENESIN 600 MG: 600 TABLET, EXTENDED RELEASE ORAL at 20:15

## 2024-09-29 RX ADMIN — BUDESONIDE AND FORMOTEROL FUMARATE DIHYDRATE 2 PUFF: 160; 4.5 AEROSOL RESPIRATORY (INHALATION) at 19:10

## 2024-09-29 RX ADMIN — TAMSULOSIN HYDROCHLORIDE 0.4 MG: 0.4 CAPSULE ORAL at 20:15

## 2024-09-29 RX ADMIN — Medication 10 ML: at 20:15

## 2024-09-29 RX ADMIN — IPRATROPIUM BROMIDE AND ALBUTEROL SULFATE 3 ML: .5; 3 SOLUTION RESPIRATORY (INHALATION) at 15:09

## 2024-09-29 RX ADMIN — ISOSORBIDE MONONITRATE 30 MG: 30 TABLET, EXTENDED RELEASE ORAL at 11:57

## 2024-09-29 RX ADMIN — ALBUTEROL SULFATE 2 PUFF: 90 INHALANT RESPIRATORY (INHALATION) at 07:16

## 2024-09-29 RX ADMIN — BUDESONIDE AND FORMOTEROL FUMARATE DIHYDRATE 2 PUFF: 160; 4.5 AEROSOL RESPIRATORY (INHALATION) at 07:16

## 2024-09-29 RX ADMIN — CETIRIZINE HYDROCHLORIDE 10 MG: 10 TABLET ORAL at 08:20

## 2024-09-29 RX ADMIN — EMPAGLIFLOZIN 10 MG: 10 TABLET, FILM COATED ORAL at 08:20

## 2024-09-29 RX ADMIN — ACETAMINOPHEN 1000 MG: 500 TABLET, FILM COATED ORAL at 20:15

## 2024-09-29 RX ADMIN — SODIUM BICARBONATE 650 MG: 650 TABLET ORAL at 20:15

## 2024-09-29 RX ADMIN — ASPIRIN 81 MG: 81 TABLET, COATED ORAL at 08:20

## 2024-09-29 RX ADMIN — GUAIFENESIN 600 MG: 600 TABLET, EXTENDED RELEASE ORAL at 08:20

## 2024-09-29 NOTE — PROGRESS NOTES
[facility] [place] - [facility]    Patient: Karoline Prater  1940   MR# [id number]   Acct# [id number]  09/29/24   11:40 CDT  Referring Provider: Prasanth Stewart MD    Chief Complaint: COVID    Interval history:  He is resting in bed on 6 L nasal cannula, O2 sat 96%.  Baseline oxygen is 2.5 L.  The patient could likely discharge home if 10L concentrator could be established for home use.  AM labs reviewed.  No new chest x-ray to review this morning.  He is afebrile.  Further orders per Dr. Crocker.  Meds:  acetaminophen, 1,000 mg, Oral, Nightly  albuterol sulfate HFA, 2 puff, Inhalation, TID - RT   And  ipratropium, 2 puff, Inhalation, TID - RT  aspirin, 81 mg, Oral, Daily  atorvastatin, 20 mg, Oral, Nightly  budesonide-formoterol, 2 puff, Inhalation, BID - RT  cetirizine, 10 mg, Oral, Daily  empagliflozin, 10 mg, Oral, Daily  fluticasone, 2 spray, Each Nare, Daily  guaiFENesin, 600 mg, Oral, Q12H  isosorbide mononitrate, 30 mg, Oral, Daily Before Lunch  melatonin, 10 mg, Oral, Nightly  metoprolol succinate XL, 25 mg, Oral, Daily  pantoprazole, 40 mg, Oral, Daily  sodium bicarbonate, 650 mg, Oral, TID  sodium chloride, 10 mL, Intravenous, Q12H  tamsulosin, 0.4 mg, Oral, Nightly           Physical Exam:  SpO2 Percentage    09/29/24 0716 09/29/24 0826 09/29/24 0840   SpO2: 99% 91% 90%     Body mass index is 27.05 kg/m².   Temp:  [98 °F (36.7 °C)-99.4 °F (37.4 °C)] 98.1 °F (36.7 °C)  Heart Rate:  [76-88] 78  Resp:  [18-20] 18  BP: (106-137)/(55-78) 116/78  Intake/Output Summary (Last 24 hours) at 9/29/2024 1140  Last data filed at 9/29/2024 0800  Gross per 24 hour   Intake 795 ml   Output 1150 ml   Net -355 ml     Physical Exam  Vitals reviewed.   Constitutional:       General: He is not in acute distress.     Appearance: He is well-developed.      Interventions: Nasal cannula in place.   HENT:      Head: Normocephalic and atraumatic.   Eyes:      General: No scleral  icterus.     Conjunctiva/sclera: Conjunctivae normal.      Pupils: Pupils are equal, round, and reactive to light.   Cardiovascular:      Rate and Rhythm: Normal rate.   Pulmonary:      Effort: Pulmonary effort is normal. No respiratory distress.      Breath sounds: Decreased breath sounds present. No wheezing or rales.   Musculoskeletal:         General: Normal range of motion.      Cervical back: Normal range of motion and neck supple.   Skin:     General: Skin is warm and dry.   Neurological:      Mental Status: He is alert and oriented to person, place, and time.   Psychiatric:         Behavior: Behavior normal.         Thought Content: Thought content normal.         Judgment: Judgment normal.     Electronically signed by MARITZA Pathak, 9/29/2024, 11:40 CDT      Physician substantive portion: medical decision making    Physician substantive portion: medical decision making  Result Review  Laboratory Data:  Results from last 7 days   Lab Units 09/29/24  0400 09/26/24  0449   WBC 10*3/mm3 7.00 8.52   HEMOGLOBIN g/dL 10.9* 11.3*   PLATELETS 10*3/mm3 159 150     Results from last 7 days   Lab Units 09/29/24  0400 09/28/24  0221 09/27/24  0957   SODIUM mmol/L 140 141 137   POTASSIUM mmol/L 4.2 4.3 4.0   CO2 mmol/L 25.0 26.0 24.0   BUN mg/dL 42* 45* 47*   CREATININE mg/dL 2.09* 2.01* 2.18*   CRP mg/dL  --   --  10.32*         Microbiology Results (last 10 days)       Procedure Component Value - Date/Time    MRSA Screen, PCR (Inpatient) - Swab, Nares [[id number]]  (Normal) Collected: 09/21/24 1300    Lab Status: Final result Specimen: Swab from Nares Updated: 09/21/24 1431     MRSA PCR No MRSA Detected    Narrative:      The negative predictive value of this diagnostic test is high and should only be used to consider de-escalating anti-MRSA therapy. A positive result may indicate colonization with MRSA and must be correlated clinically.    Respiratory Culture - Sputum, Cough [[id number]] Collected: 09/21/24  0047    Lab Status: Final result Specimen: Sputum from Cough Updated: 09/23/24 1032     Respiratory Culture Scant growth (1+) Normal Respiratory Nya     Gram Stain Moderate (3+) WBCs per low power field      Rare (1+) Epithelial cells per low power field      Few (2+) Mixed gram positive nya    Blood Culture - Blood, Arm, Left [[id number]]  (Normal) Collected: 09/19/24 2025    Lab Status: Final result Specimen: Blood from Arm, Left Updated: 09/24/24 2100     Blood Culture No growth at 5 days    Respiratory Panel PCR w/COVID-19(SARS-CoV-2) JORDYN/MATILDE/BRYAN/PAD/COR/ASHER In-House, NP Swab in UTM/VTM, 2 HR TAT - Swab, Nasopharynx [[id number]]  (Abnormal) Collected: 09/19/24 1944    Lab Status: Final result Specimen: Swab from Nasopharynx Updated: 09/19/24 2113     ADENOVIRUS, PCR Not Detected     Coronavirus 229E Not Detected     Coronavirus HKU1 Not Detected     Coronavirus NL63 Not Detected     Coronavirus OC43 Not Detected     COVID19 Detected     Human Metapneumovirus Not Detected     Human Rhinovirus/Enterovirus Not Detected     Influenza A PCR Not Detected     Influenza B PCR Not Detected     Parainfluenza Virus 1 Not Detected     Parainfluenza Virus 2 Not Detected     Parainfluenza Virus 3 Not Detected     Parainfluenza Virus 4 Not Detected     RSV, PCR Not Detected     Bordetella pertussis pcr Not Detected     Bordetella parapertussis PCR Not Detected     Chlamydophila pneumoniae PCR Not Detected     Mycoplasma pneumo by PCR Not Detected    Narrative:      In the setting of a positive respiratory panel with a viral infection PLUS a negative procalcitonin without other underlying concern for bacterial infection, consider observing off antibiotics or discontinuation of antibiotics and continue supportive care. If the respiratory panel is positive for atypical bacterial infection (Bordetella pertussis, Chlamydophila pneumoniae, or Mycoplasma pneumoniae), consider antibiotic de-escalation to target atypical bacterial  infection.    Urine Culture - Urine, Urine, Clean Catch [[id number]]  (Abnormal)  (Susceptibility) Collected: 09/19/24 1943    Lab Status: Final result Specimen: Urine, Clean Catch Updated: 09/22/24 1209     Urine Culture >100,000 CFU/mL Citrobacter amalonaticus    Narrative:      Colonization of the urinary tract without infection is common. Treatment is discouraged unless the patient is symptomatic, pregnant, or undergoing an invasive urologic procedure.    Susceptibility        Citrobacter amalonaticus      BRANDIN      Amoxicillin + Clavulanate Resistant      Cefazolin Resistant      Cefepime Susceptible      Ceftazidime Susceptible      Ceftriaxone Susceptible      Gentamicin Susceptible      Levofloxacin Susceptible      Nitrofurantoin Intermediate      Piperacillin + Tazobactam Susceptible      Trimethoprim + Sulfamethoxazole Susceptible                           Blood Culture - Blood, Arm, Left [[id number]]  (Normal) Collected: 09/19/24 1928    Lab Status: Final result Specimen: Blood from Arm, Left Updated: 09/24/24 2000     Blood Culture No growth at 5 days           Recent films:  No radiology results from the last 24 hrs   Personal review of imaging : CXR shows reviewed last imaging studies it shows bilateral chronic changes and no acute changes.      Pulmonary Assessment:  Acute on chronic hypoxic respiratory failure  Dependence on supplemental oxygen  Bilateral pulm infiltrate/post-COVID syndrome  Secondary bacterial pneumonia  History of interstitial lung disease  Chronic congestive diastolic heart failure with preserved EF.  Chronic obstructive pulmonary disease.  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   He is comfortable on 6 L of oxygen but requires 8 L on exertion  Requested a case management consult to see  the bed 10 L oxygen concentrator at home through [employer].  Patient is complaining of nasal dryness and saline nasal gel has been added.  He completed antibiotics and dexamethasone and already treated with COVID medications.  Keep titrating oxygen and keep saturation more than 92%.  Continue on albuterol HFA bronchodilator treatment  Chest x-ray shows interstitial changes from chronic lung disease with superimposed infection or pneumonia  DVT and stress ulcer prophylaxis.  Pain and anxiety control.  Nutritional support.  Repeat labs and x-rays from time to time.  He is off COVID isolation  COVID treatment and dexamethasone completed  We will follow with me as an outpatient after discharge  Continue  on Flonase and Zyrtec for nasal allergy.  Repeat labs and imaging studies and monitor inflammatory markers from time to time.  CODE STATUS: Full..  Overall prognosis: Guarded.  We will continue following.  He is at his baseline anticipate discharge soon.    Time spent by me: 35 min    This visit was performed by both a physician and an [facility] RN.  I performed all aspects of the medical decision making as documented.    Electronically signed by     Tarik Crocker MD,  Pulmonologist/Intensivist   9/29/2024, 16:18 CDT

## 2024-09-29 NOTE — CASE MANAGEMENT/SOCIAL WORK
Continued Stay Note   Antoinette     Patient Name: Karoline Prater  MRN: [id number]  Today's Date: 9/29/2024    Admit Date: 9/19/2024    Plan: Home Health   Discharge Plan       Row Name 09/29/24 1142       Plan    Plan Home Health    Plan Comments MD note:I expect the patient to be discharged to home with home health (possibly) when 02 requirement has improved (still requiring 8LNC with even minimal activity) and will need home 02 concentrator capable of 10L. Per Dr. Stewart pt will be ready to d/c tomorrow, will be better to set this up tomorrow- Monday instead of a Sunday since not going today. Will have SW follow up tomorrow.                   Discharge Codes    No documentation.                 Expected Discharge Date and Time       Expected Discharge Date Expected Discharge Time    Sep 28, 2024               ANDI Keene

## 2024-09-29 NOTE — PROGRESS NOTES
"    [facility] Medicine Services  INPATIENT PROGRESS NOTE    Patient Name: Karoline Prater  Date of Admission: 9/19/2024  Today's Date: 09/29/24  Length of Stay: 9  Primary Care Physician: Irving Alexis MD    Subjective   Chief Complaint: follow-up shortness of breath  HPI   Patient indicated that he has been having some nosebleeding, and feels like that the left side of his nose (left nare) is stopped up.  He has been using humidified O2, Flonase nasal spray, in addition to a saline nasal gel, but does not report having much benefit thus far with \"opening up\" his left nostril.  Denies any new cough.  Reports he is able to walk from \"the bed to the end of this room and back\" and then has to rest and recover.  His 02 is typically increased to 8LNC with activity.    Review of Systems   All pertinent negatives and positives are as above. All other systems have been reviewed and are negative unless otherwise stated.     Objective    Temp:  [98 °F (36.7 °C)-99.4 °F (37.4 °C)] 98.1 °F (36.7 °C)  Heart Rate:  [76-88] 78  Resp:  [18-20] 18  BP: (106-137)/(55-78) 116/78  Physical Exam  Vitals reviewed.   Constitutional:       General: He is not in acute distress.     Appearance: He is not ill-appearing.   HENT:      Head: Normocephalic.      Nose:      Comments: No active epistaxis; small clotted area noted left nare     Mouth/Throat:      Mouth: Mucous membranes are moist.   Eyes:      Pupils: Pupils are equal, round, and reactive to light.   Cardiovascular:      Rate and Rhythm: Normal rate.   Pulmonary:      Effort: Pulmonary effort is normal.      Comments: Scattered coarse, crackly BSs on 6LNC during my assessment with 02 sats 88-92%.  We briefly took his 02 off to examine his nose (recent epistaxis) and his 02 sats dipped to 78%  Musculoskeletal:         General: No deformity.   Skin:     General: Skin is warm.   Neurological:      Mental Status: He is alert.      Motor: " Weakness present.   Psychiatric:         Mood and Affect: Mood normal.       Results Review:  I have reviewed the labs, radiology results, and diagnostic studies.    Laboratory Data:   Results from last 7 days   Lab Units 09/29/24  0400 09/26/24  0449   WBC 10*3/mm3 7.00 8.52   HEMOGLOBIN g/dL 10.9* 11.3*   HEMATOCRIT % 37.1* 37.0*   PLATELETS 10*3/mm3 159 150        Results from last 7 days   Lab Units 09/29/24  0400 09/28/24  0221 09/27/24  0957   SODIUM mmol/L 140 141 137   POTASSIUM mmol/L 4.2 4.3 4.0   CHLORIDE mmol/L 103 106 99   CO2 mmol/L 25.0 26.0 24.0   BUN mg/dL 42* 45* 47*   CREATININE mg/dL 2.09* 2.01* 2.18*   CALCIUM mg/dL 10.2 9.9 10.4   GLUCOSE mg/dL 103* 119* 242*       Culture Data:   Respiratory Culture   Date Value Ref Range Status   09/21/2024 Scant growth (1+) Normal Respiratory Sherrie  Final       Radiology Data:   Imaging Results (Last 24 Hours)       ** No results found for the last 24 hours. **            I have reviewed the patient's current medications.     Assessment/Plan   Assessment  Active Hospital Problems    Diagnosis     **Acute on chronic respiratory failure with hypoxia     Acute-on-chronic respiratory failure     Pneumonitis     Bacterial pneumonia     COVID-19 virus infection     Chronic heart failure with preserved ejection fraction (HFpEF)     COPD (chronic obstructive pulmonary disease)     A-fib     Pulmonary fibrosis     Stage 3b chronic kidney disease     Malignant neoplasm of upper lobe of right lung     Essential hypertension        Treatment Plan  Walking oximetry study today  SW consulted - anticipate patient will need concentrator capable of 10L nasal cannula with exertion at home.  Patient on 6LNC this morning (patient on 2.5-3LNC chronically in the outpatient setting prior to this hospitalization).  Requiring 8LNC with exertion  Patient finished 7 days of IV Cefepime  Pulmonary following and appreciate their assistance  Diuretics on hold - creatinine stable  Repeat  CXR in AM tomorrow  Lab holiday tomorrow  9 .  Patient initially tested positive for COVID-19 on 9/5/2024.  He was subsequently hospitalized from 9/5 through 9/8.  He was discharged home with prescription for dexamethasone-completed a full course of dexamethasone in the outpatient setting.  He also received 2 days of remdesivir during his previous hospitalization per discharge summary on 9/8/2024.  Enhanced contact and droplet precautions now removed  10.  Continue albuterol and Atrovent HFA  11.  Continue Symbicort  12.  Continue incentive spirometry and flutter valve  13.  PT and OT; patient indicated that he lives at home alone.  He states that he has a brother that lives a couple of miles away from him.  He does report that he needs to get home soon as he has bills to pay.  As noted above, plan for walking oximetry study this AM   14.  Continue humidified oxygen, will add saline nasal spray  15.  Dispo: possible discharge home tomorrow; need to confirm 10L high flow 02 concentrator can be coordinated for use at home.    Medical Decision Making  Number and Complexity of problems: 2 acute; moderate complexity      Conditions and Status        Condition is unchanged.     [facility] Data  External documents reviewed: none  Cardiac tracing (EKG, telemetry) interpretation: no new EKGs this AM  Radiology interpretation: no new radiology studies  Labs reviewed: as above  Any tests that were considered but not ordered: none     Decision rules/scores evaluated (example [id number], Wells, etc): none     Discussed with: patient     Care Planning  Shared decision making: Discussed with patient with agreement to proceed with treatment plan as outlined  Code status and discussions: Full code    Disposition  Social Determinants of Health that impact treatment or disposition: None apparent at this time; patient does live at home alone  I expect the patient to be discharged to home with home health (possibly) when 02 requirement has  improved (still requiring 8LNC with even minimal activity) and will need home 02 concentrator capable of 10L.    Electronically signed by Prasanth Stewart MD, 09/29/24, 08:59 CDT.    Home

## 2024-09-29 NOTE — PLAN OF CARE
Problem: Adult Inpatient Plan of Care  Goal: Plan of Care Review  Outcome: Progressing  Flowsheets (Taken 9/29/2024 0435)  Outcome Evaluation: A&OX4. VSS. Increased oxygen. O2 90-93% @8L HFNC. Crackles noted in LLL. Bed alarm in place. Safety maintained. Will continue to monitor.  75-85  Goal: Patient-Specific Goal (Individualized)  Outcome: Progressing  Goal: Absence of Hospital-Acquired Illness or Injury  Outcome: Progressing  Intervention: Identify and Manage Fall Risk  Recent Flowsheet Documentation  Taken 9/29/2024 0400 by Gladis Davis RN  Safety Promotion/Fall Prevention: safety round/check completed  Taken 9/29/2024 0200 by Gladis Davis RN  Safety Promotion/Fall Prevention: safety round/check completed  Taken 9/29/2024 0000 by Gladis Davis RN  Safety Promotion/Fall Prevention: safety round/check completed  Taken 9/28/2024 2200 by Gladis Davis RN  Safety Promotion/Fall Prevention: safety round/check completed  Taken 9/28/2024 2100 by Gladis Davis RN  Safety Promotion/Fall Prevention: safety round/check completed  Taken 9/28/2024 2000 by Gladis Davis RN  Safety Promotion/Fall Prevention: safety round/check completed  Intervention: Prevent Skin Injury  Recent Flowsheet Documentation  Taken 9/28/2024 2000 by Gladis Davis RN  Body Position: position changed independently  Skin Protection:   adhesive use limited   protective footwear used   silicone foam dressing in place   tubing/devices free from skin contact   skin-to-skin areas padded  Intervention: Prevent and Manage VTE (Venous Thromboembolism) Risk  Recent Flowsheet Documentation  Taken 9/28/2024 2000 by Gladis Davis RN  Activity Management: activity encouraged  VTE Prevention/Management:   sequential compression devices off   patient refused intervention  Range of Motion: active ROM (range of motion) encouraged  Intervention: Prevent Infection  Recent Flowsheet Documentation  Taken 9/28/2024 2000 by Gladis Davis RN  Infection  Prevention:   single patient room provided   rest/sleep promoted  Goal: Optimal Comfort and Wellbeing  Outcome: Progressing  Intervention: Provide Person-Centered Care  Recent Flowsheet Documentation  Taken 9/28/2024 2000 by Gladis Davis RN  Trust Relationship/Rapport:   care explained   questions answered   questions encouraged  Goal: Readiness for Transition of Care  Outcome: Progressing   Goal Outcome Evaluation:              Outcome Evaluation: A&OX4. VSS. Increased oxygen. O2 90-93% @8L HFNC. Crackles noted in LLL. Bed alarm in place. Safety maintained. Will continue to monitor.  75-85

## 2024-09-30 ENCOUNTER — APPOINTMENT (OUTPATIENT)
Dept: GENERAL RADIOLOGY | Facility: HOSPITAL | Age: 84
DRG: 189 | End: 2024-09-30
Payer: MEDICARE

## 2024-09-30 ENCOUNTER — TELEPHONE (OUTPATIENT)
Dept: OTOLARYNGOLOGY | Facility: CLINIC | Age: 84
End: 2024-09-30

## 2024-09-30 PROBLEM — R04.0 EPISTAXIS: Status: ACTIVE | Noted: 2024-09-30

## 2024-09-30 PROBLEM — J31.0 CHRONIC RHINITIS: Status: ACTIVE | Noted: 2024-09-30

## 2024-09-30 PROCEDURE — 94761 N-INVAS EAR/PLS OXIMETRY MLT: CPT

## 2024-09-30 PROCEDURE — 94664 DEMO&/EVAL PT USE INHALER: CPT

## 2024-09-30 PROCEDURE — 71045 X-RAY EXAM CHEST 1 VIEW: CPT

## 2024-09-30 PROCEDURE — 94799 UNLISTED PULMONARY SVC/PX: CPT

## 2024-09-30 PROCEDURE — 99233 SBSQ HOSP IP/OBS HIGH 50: CPT | Performed by: INTERNAL MEDICINE

## 2024-09-30 PROCEDURE — 99222 1ST HOSP IP/OBS MODERATE 55: CPT | Performed by: OTOLARYNGOLOGY

## 2024-09-30 RX ORDER — ECHINACEA PURPUREA EXTRACT 125 MG
2 TABLET ORAL CONTINUOUS PRN
Status: DISCONTINUED | OUTPATIENT
Start: 2024-09-30 | End: 2024-10-07 | Stop reason: HOSPADM

## 2024-09-30 RX ORDER — OXYMETAZOLINE HYDROCHLORIDE 0.05 G/100ML
2 SPRAY NASAL 3 TIMES DAILY
Status: COMPLETED | OUTPATIENT
Start: 2024-09-30 | End: 2024-10-03

## 2024-09-30 RX ORDER — ECHINACEA PURPUREA EXTRACT 125 MG
2 TABLET ORAL
Status: DISCONTINUED | OUTPATIENT
Start: 2024-09-30 | End: 2024-10-07 | Stop reason: HOSPADM

## 2024-09-30 RX ADMIN — SODIUM BICARBONATE 650 MG: 650 TABLET ORAL at 20:45

## 2024-09-30 RX ADMIN — IPRATROPIUM BROMIDE AND ALBUTEROL SULFATE 3 ML: .5; 3 SOLUTION RESPIRATORY (INHALATION) at 20:57

## 2024-09-30 RX ADMIN — Medication 10 ML: at 20:46

## 2024-09-30 RX ADMIN — TAMSULOSIN HYDROCHLORIDE 0.4 MG: 0.4 CAPSULE ORAL at 20:45

## 2024-09-30 RX ADMIN — GUAIFENESIN 600 MG: 600 TABLET, EXTENDED RELEASE ORAL at 20:45

## 2024-09-30 RX ADMIN — BUDESONIDE AND FORMOTEROL FUMARATE DIHYDRATE 2 PUFF: 160; 4.5 AEROSOL RESPIRATORY (INHALATION) at 20:57

## 2024-09-30 RX ADMIN — GUAIFENESIN 600 MG: 600 TABLET, EXTENDED RELEASE ORAL at 08:21

## 2024-09-30 RX ADMIN — Medication 10 MG: at 20:45

## 2024-09-30 RX ADMIN — Medication 2 SPRAY: at 15:03

## 2024-09-30 RX ADMIN — Medication 2 SPRAY: at 20:44

## 2024-09-30 RX ADMIN — SODIUM BICARBONATE 650 MG: 650 TABLET ORAL at 08:21

## 2024-09-30 RX ADMIN — EMPAGLIFLOZIN 10 MG: 10 TABLET, FILM COATED ORAL at 08:21

## 2024-09-30 RX ADMIN — SALINE NASAL SPRAY 2 SPRAY: 1.5 SOLUTION NASAL at 18:15

## 2024-09-30 RX ADMIN — ATORVASTATIN CALCIUM 20 MG: 10 TABLET, FILM COATED ORAL at 20:45

## 2024-09-30 RX ADMIN — ACETAMINOPHEN 1000 MG: 500 TABLET, FILM COATED ORAL at 20:45

## 2024-09-30 RX ADMIN — SALINE NASAL SPRAY 2 SPRAY: 1.5 SOLUTION NASAL at 15:02

## 2024-09-30 RX ADMIN — IPRATROPIUM BROMIDE AND ALBUTEROL SULFATE 3 ML: .5; 3 SOLUTION RESPIRATORY (INHALATION) at 06:51

## 2024-09-30 RX ADMIN — SALINE NASAL SPRAY 2 SPRAY: 1.5 SOLUTION NASAL at 20:46

## 2024-09-30 RX ADMIN — ISOSORBIDE MONONITRATE 30 MG: 30 TABLET, EXTENDED RELEASE ORAL at 10:47

## 2024-09-30 RX ADMIN — IPRATROPIUM BROMIDE AND ALBUTEROL SULFATE 3 ML: .5; 3 SOLUTION RESPIRATORY (INHALATION) at 14:12

## 2024-09-30 RX ADMIN — FLUTICASONE PROPIONATE 2 SPRAY: 50 SPRAY, METERED NASAL at 08:24

## 2024-09-30 RX ADMIN — METOPROLOL SUCCINATE 25 MG: 25 TABLET, EXTENDED RELEASE ORAL at 08:21

## 2024-09-30 RX ADMIN — SODIUM BICARBONATE 650 MG: 650 TABLET ORAL at 15:59

## 2024-09-30 RX ADMIN — IPRATROPIUM BROMIDE AND ALBUTEROL SULFATE 3 ML: .5; 3 SOLUTION RESPIRATORY (INHALATION) at 10:36

## 2024-09-30 RX ADMIN — Medication 10 ML: at 08:25

## 2024-09-30 RX ADMIN — PANTOPRAZOLE SODIUM 40 MG: 40 TABLET, DELAYED RELEASE ORAL at 08:21

## 2024-09-30 RX ADMIN — CETIRIZINE HYDROCHLORIDE 10 MG: 10 TABLET ORAL at 08:21

## 2024-09-30 RX ADMIN — ASPIRIN 81 MG: 81 TABLET, COATED ORAL at 08:21

## 2024-09-30 RX ADMIN — BUDESONIDE AND FORMOTEROL FUMARATE DIHYDRATE 2 PUFF: 160; 4.5 AEROSOL RESPIRATORY (INHALATION) at 06:57

## 2024-09-30 NOTE — PROGRESS NOTES
[facility] [place]ULMONARY PROGRESS NOTE - [facility]    Patient: Karoline Prater  1940   MR# [id number]   Acct# [id number]  09/30/24   12:05 CDT  Referring Provider: Prasanth Stewart MD    Chief Complaint: COVID    Interval history:  Patient is up to bedside commode on 10L this AM.  AM labs reviewed.  CXR is pending.  He is afebrile.  Awaiting to see if DME can set up 10L concentrator for patient. Further orders per Dr. Crocker.  Meds:  acetaminophen, 1,000 mg, Oral, Nightly  aspirin, 81 mg, Oral, Daily  atorvastatin, 20 mg, Oral, Nightly  budesonide-formoterol, 2 puff, Inhalation, BID - RT  cetirizine, 10 mg, Oral, Daily  empagliflozin, 10 mg, Oral, Daily  fluticasone, 2 spray, Each Nare, Daily  guaiFENesin, 600 mg, Oral, Q12H  ipratropium-albuterol, 3 mL, Nebulization, 4x Daily - RT  isosorbide mononitrate, 30 mg, Oral, Daily Before Lunch  melatonin, 10 mg, Oral, Nightly  metoprolol succinate XL, 25 mg, Oral, Daily  pantoprazole, 40 mg, Oral, Daily  sodium bicarbonate, 650 mg, Oral, TID  sodium chloride, 10 mL, Intravenous, Q12H  tamsulosin, 0.4 mg, Oral, Nightly           Physical Exam:  SpO2 Percentage    09/30/24 1036 09/30/24 1042 09/30/24 1127   SpO2: 93% 100% 95%     Body mass index is 26.57 kg/m².   Temp:  [97.6 °F (36.4 °C)-98 °F (36.7 °C)] 98 °F (36.7 °C)  Heart Rate:  [61-92] 81  Resp:  [16-22] 20  BP: (112-124)/(58-73) 112/71  Intake/Output Summary (Last 24 hours) at 9/30/2024 1205  Last data filed at 9/30/2024 0856  Gross per 24 hour   Intake --   Output 900 ml   Net -900 ml     Physical Exam  Vitals reviewed.   Constitutional:       Appearance: He is well-developed.      Interventions: Nasal cannula in place.   Cardiovascular:      Rate and Rhythm: Normal rate.   Pulmonary:      Effort: Pulmonary effort is normal.      Breath sounds: Decreased breath sounds present.   Musculoskeletal:         General: Normal range of motion.      Cervical back: Normal range of motion and neck  supple.   Neurological:      Mental Status: He is alert and oriented to person, place, and time.   Psychiatric:         Behavior: Behavior normal.         Thought Content: Thought content normal.         Judgment: Judgment normal.     Electronically signed by MARITZA Pathak, 9/30/2024, 12:05 CDT      Physician substantive portion: medical decision making    Physician substantive portion: medical decision making  Result Review  Laboratory Data:  Results from last 7 days   Lab Units 09/29/24  0400 09/26/24  0449   WBC 10*3/mm3 7.00 8.52   HEMOGLOBIN g/dL 10.9* 11.3*   PLATELETS 10*3/mm3 159 150     Results from last 7 days   Lab Units 09/29/24  0400 09/28/24  0221 09/27/24  0957   SODIUM mmol/L 140 141 137   POTASSIUM mmol/L 4.2 4.3 4.0   CO2 mmol/L 25.0 26.0 24.0   BUN mg/dL 42* 45* 47*   CREATININE mg/dL 2.09* 2.01* 2.18*   CRP mg/dL  --   --  10.32*         Microbiology Results (last 10 days)       Procedure Component Value - Date/Time    MRSA Screen, PCR (Inpatient) - Swab, Nares [[id number]]  (Normal) Collected: 09/21/24 1300    Lab Status: Final result Specimen: Swab from Nares Updated: 09/21/24 1431     MRSA PCR No MRSA Detected    Narrative:      The negative predictive value of this diagnostic test is high and should only be used to consider de-escalating anti-MRSA therapy. A positive result may indicate colonization with MRSA and must be correlated clinically.    Respiratory Culture - Sputum, Cough [[id number]] Collected: 09/21/24 0047    Lab Status: Final result Specimen: Sputum from Cough Updated: 09/23/24 1032     Respiratory Culture Scant growth (1+) Normal Respiratory Nya     Gram Stain Moderate (3+) WBCs per low power field      Rare (1+) Epithelial cells per low power field      Few (2+) Mixed gram positive nya           Recent films:  XR Chest 1 View    Result Date: 9/30/2024  EXAMINATION: XR CHEST 1 VW- 9/30/2024 1:46 PM  HISTORY: follow-up hypoxemia.  REPORT: A frontal view of the  chest was obtained.  COMPARISON: Chest x-ray 9/26/2024. CT chest without contrast 9/21/2024.  There are coarse reticular interstitial opacities throughout both lungs similar to the previous study and likely related to advanced pulmonary fibrosis. There is a superimposed area of stable masslike parenchymal consolidation in the right upper lobe which is unchanged. There is blunting of the right costophrenic angle which may be due to a small pleural effusion or chronic pleural thickening. The right internal jugular port catheter appears in good position as before. Heart size is normal.      Impression: Stable chest x-ray compared with the study from 4 days earlier.  This report was signed and finalized on 9/30/2024 1:49 PM by Dr. Allen Churchill MD.      Personal review of imaging : CXR shows last chest x-ray shows stable findings with chronic changes no significant change noted from the prior x-ray done 4 days ago.      Pulmonary Assessment:  Acute on chronic hypoxic respiratory failure  Dependence on supplemental oxygen  Bilateral pulm infiltrate/post-COVID syndrome  Secondary bacterial pneumonia  History of interstitial lung disease  Chronic congestive diastolic heart failure with preserved EF.  Chronic obstructive pulmonary disease.  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  He was resting comfortably on 7 L high flow oxygen but requiring 8 to 9 L on exertion.  He is waiting to set up a bigger concentrator at home.  He had some nasal dryness and also had some epistaxis.  He was seen by ENT today   Currently no further nosebleed and hemoglobin is stable  Patient completed treatment for COVID with dexamethasone.    Continue bronchodilator treatment routine respiratory care.  Encourage incentive spirometry  Plan for discharge  home when the higher concentrator is available.    His current concentrator goes up to 6 L and is requiring more oxygen than that  Patient will need humidified oxygen titration to keep saturation more than 82%  DVT and stress ulcer prophylaxis pain and anxiety control.  He has epistaxis so his anticoagulation is on hold.Continue sinus medications  Nutritional support.  Plan for outpatient pulmonary follow-up with me after discharge.  Labs and imaging studies from time to time  CODE STATUS: Full.  Overall prognosis: Guarded.  We will follow    Time spent by me: 35 min    This visit was performed by both a physician and an [facility] RN.  I performed all aspects of the medical decision making as documented.    Electronically signed by     Tarik Crocker MD,   Pulmonologist/Intensivist   9/30/2024, 20:34 CDT

## 2024-09-30 NOTE — CONSULTS
[facility] Otolaryngology Head and Neck Surgery  CONSULT  Patient Name: Karoline Prater  : 1940  MRN: [id number]  Primary Care Physician:  Irving Alexis MD  Referring Physician: No ref. provider found  Date of admission: 2024  Length of Stay: 10  ROOM: [facility]     Subjective    Subjective   Shortness of Breath      Chief Complaint/Reason for Consultation: Epistaxis  Accompanied by: No one  Karoline Prater is a 84 y.o. male admitted for respiratory difficulty.  Since being admitted, patient has been on high flow oxygen.  He states that he has nosebleeds.  He is unable to define how long it has been since his last nosebleeds he keeps refer me back to the chart records as to when he changed types of oxygen delivery.  He cannot even tell me if he has bled today.  He states that since he has had humidity on his oxygen, he has gotten better.  He feels like he has blood clots in both sides of his nose.  He feels this is stopping him from breathing well.  Patient does have a history of severe emphysema and stage IV lung cancer.  He has been on oxygen at home as well.  Patient is seen, chart is reviewed      Review of Systems   Respiratory:  Positive for shortness of breath.       Otherwise complete ROS is negative except as mentioned above.  A 14-point review was carried out    Past Medical History:   Past Medical History:   Diagnosis Date    Arthritis     Atrial fibrillation with rapid ventricular response 2019    Blindness of left eye     BPH with obstruction/lower urinary tract symptoms     Cancer     stomach & lung    CHF (congestive heart failure)     CKD (chronic kidney disease) stage 3, GFR 30-59 ml/min     COPD with acute exacerbation 2024    Coronary artery disease     Elevated cholesterol     Essential hypertension 10/02/2017    Fibrotic lung diseases     GERD (gastroesophageal reflux disease)     Hearing loss     History of transfusion      Hydronephrosis of left kidney     Hyperlipidemia     Hypertension     pt was taken off of all of his medications for BP (atenolol, lisinopril, lasix) because his BP kept bottoming out so his primary dr told him to discontinue them 1-2 months ago (Jan/Feb 2019). pt states he takes no medications currently.    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 Patel is just going to keep an eye on it and not do surgery right now.    Mediastinal adenopathy 10/24/2018    Station 7    Monoclonal gammopathy of unknown significance (MGUS) 09/11/2018    Pancreatic mass     pt states he had this in 2013 but it went away on its own. Now recent CT shows it has come back so he is going to have an ultrasound on 3/13/19.    Shortness of breath      Past Surgical History:  Past Surgical History:   Procedure Laterality Date    ABDOMINAL SURGERY      BRONCHOSCOPY N/A 10/24/2018    Procedure: BRONCHOSCOPY WITH BIOPSY, EBUS;  Surgeon: Gareth Becerra MD;  Location: [facility] OR;  Service: Pulmonary    CHOLECYSTECTOMY      COLONOSCOPY N/A 1/3/2019    Procedure: COLONOSCOPY WITH ANESTHESIA;  Surgeon: Randy Somers DO;  Location: [facility] ENDOSCOPY;  Service: Gastroenterology    CYSTOSCOPY, RETROGRADE PYELOGRAM AND STENT INSERTION Left 3/8/2019    Procedure: CYSTOSCOPY RETROGRADE BILATERAL PYELOGRAM;  Surgeon: Jos Sylvester MD;  Location: [facility] OR;  Service: Urology    ENDOSCOPY N/A 12/11/2018    Procedure: ESOPHAGOGASTRODUODENOSCOPY WITH ANESTHESIA;  Surgeon: Randy Somers DO;  Location: [facility] ENDOSCOPY;  Service: Gastroenterology    ENDOSCOPY N/A 4/29/2019    Procedure: ESOPHAGOGASTRODUODENOSCOPY WITH ANESTHESIA;  Surgeon: Lilliam Jj MD;  Location: [facility] OR;  Service: Gastroenterology    ENDOSCOPY N/A 5/9/2019    Procedure: ESOPHAGOGASTRODUODENOSCOPY WITH ANESTHESIA;   Surgeon: Pilo Bansal MD;  Location: [facility] ENDOSCOPY;  Service: Gastroenterology    EYE SURGERY Left 1964    FOOT SURGERY Right 1966    joint    FRACTURE SURGERY      PACEMAKER IMPLANTATION Left 8/29/2024    Procedure: Leadless pacemaker implant and AVN ablation;  Surgeon: Carlitos Bowen MD;  Location: [facility] CATH INVASIVE LOCATION;  Service: Cardiovascular;  Laterality: Left;       Family History: His family history includes Hypertension in his mother; Leukemia in his father.   Social History: He  reports that he quit smoking about 20 years ago. His smoking use included cigarettes. He started smoking about 69 years ago. He quit smokeless tobacco use about 54 years ago.  His smokeless tobacco use included chew. He reports that he does not drink alcohol and does not use drugs.    Home Medications:  acetaminophen, albuterol sulfate HFA, aspirin, atorvastatin, bumetanide, dextromethorphan polistirex ER, empagliflozin, fluticasone, isosorbide mononitrate, melatonin, metoprolol succinate XL, multivitamin with minerals, pantoprazole, sodium bicarbonate, tamsulosin, and tiotropium bromide-olodaterol    Allergies:  He is allergic to penicillins.    Objective    Objective   Vitals:    Temp:  [97.6 °F (36.4 °C)-98 °F (36.7 °C)] 98 °F (36.7 °C)  Heart Rate:  [61-92] 81  Resp:  [16-22] 20  BP: (112-124)/(58-73) 112/71  Flow (L/min):  [6-10] 10    Physical Exam  Vitals reviewed.   Constitutional:       General: He is not in acute distress.     Appearance: Normal appearance. He is well-developed and normal weight. He is ill-appearing.      Comments: Sitting up in bed, wearing nasal O2 on 10 L  No active bleeding visualized   HENT:      Head: Atraumatic.      Comments: Mild temporal wasting     Right Ear: External ear normal. Decreased hearing noted.      Left Ear: External ear normal. Decreased hearing noted.      Nose: Congestion present. No nasal tenderness, mucosal edema or rhinorrhea.      Right Nostril: No epistaxis.       Left Nostril: No epistaxis.      Comments: Bilateral crusting in the inferior nasal passage partially obstructing the nasal passage  No active bleeding noted     Mouth/Throat:      Lips: Pink.      Mouth: Mucous membranes are dry.      Dentition: Abnormal dentition (Missing teeth). No gum lesions.      Tongue: No lesions. Tongue does not deviate from midline.      Pharynx: Oropharynx is clear. Uvula midline.      Comments: Tongue-dry  Pharynx-dry  Eyes:      General: Lids are normal. Gaze aligned appropriately.      Extraocular Movements: Extraocular movements intact.      Conjunctiva/sclera: Conjunctivae normal.   Neck:      Thyroid: No thyroid mass or thyromegaly.      Comments: Atrophic musculature  Pulmonary:      Effort: Tachypnea present. No respiratory distress or retractions.      Breath sounds: No stridor.      Comments: Wearing humidified nasal O2 via nasal cannula  Musculoskeletal:      Cervical back: No rigidity or crepitus. Decreased range of motion.   Lymphadenopathy:      Cervical: No cervical adenopathy.   Skin:     Findings: No rash.   Neurological:      General: No focal deficit present.      Mental Status: He is alert.      Cranial Nerves: Cranial nerves 2-12 are intact. No cranial nerve deficit.   Psychiatric:         Attention and Perception: Attention and perception normal.         Mood and Affect: Mood and affect normal.         Speech: Speech normal.         Behavior: Behavior normal. Behavior is cooperative.         Thought Content: Thought content normal.         Cognition and Memory: Cognition normal.         Judgment: Judgment normal.         Result Review    Result Review:  I have personally reviewed the results from the time of this admission to 9/30/2024 12:55 CDT and agree with these findings:  []  Laboratory  []  Microbiology  []  Radiology  []  EKG/Telemetry   []  Cardiology/Vascular   []  Pathology  [x]  Old records  []  Other:  Most notable findings include: No labs pertinent  to ENT this morning  Progress Notes by Tarik Crocker MD (09/29/2024 11:40)    Progress Notes by Prasanth Stewart MD (09/30/2024 11:19)    Progress Notes by Mariam Taveras APRN (09/30/2024 12:05)    Assessment & Plan    Assessment / Plan   Brief Patient Summary:  Karoline Prater is a 84 y.o. male who has had some epistaxis.  He has no active epistaxis at this point time.  He is difficult to pin down on the timing and date of epistaxis.  He does wear high flow nasal O2 at this point in time.  He says he is better on humidified O2.  The patient has no active epistaxis at this point in time.  He does have bilateral nasal crusting present.  I will begin nasal hygiene and see if the patient resolves with this.  He is to not blow his nose.    Active Hospital Problems:  Active Hospital Problems    Diagnosis     **Acute on chronic respiratory failure with hypoxia     Epistaxis     Chronic rhinitis     Acute-on-chronic respiratory failure     Pneumonitis     Bacterial pneumonia     COVID-19 virus infection     Chronic heart failure with preserved ejection fraction (HFpEF)     COPD (chronic obstructive pulmonary disease)     A-fib     Pulmonary fibrosis     Stage 3b chronic kidney disease     Malignant neoplasm of upper lobe of right lung     Essential hypertension            Plan:   Epistaxis-patient has epistaxis due to chronic rhinitis.  I will recommend nasal hygiene at this point in time.  I will follow for any further epistaxis.  I will try to clear his nose so as to remove his nasal complaints.  Respiratory distress-patient is wearing nasal O2.  He is followed by pulmonary service.    I discussed the patient's findings and my recommendations with patient and Dr. Stewart  Following patient as in-patient. Further recommendations will be made based on serial examinations.    Medications/Orders for this encounter: Medications ordered- saline, nasal hygiene, Afrin   No New orders written.         VTE  Prophylaxis:  Mechanical VTE prophylaxis orders are present.        Discharge Planning: Per primary team    Electronically signed by Vijay James Jr, MD, 09/30/24, 12:46 PM CDT.

## 2024-09-30 NOTE — PROGRESS NOTES
"    [facility] Services  INPATIENT PROGRESS NOTE    Patient Name: Karoline Prater  Date of Admission: 9/19/2024  Today's Date: 09/30/24  Length of Stay: 10  Primary Care Physician: Irving Alexis MD    Subjective   Chief Complaint: follow-up shortness of breath  HPI   Patient states that the main thing bothering him has been intermittent epistaxis which will eventually stop, but then he feels like he develops a scab/clot in his nostril that impedes his ability to breath like he wants.  \"If you can get my nose cleared out I'd breath a whole lot better.\"  I did see some old blood stain around his nostrils.  He reports having to get up to have a bowel movement this morning, and also reports that chest x-ray just came by, and even this activity made him feel worsening shortness of breath.  He was on 10 L by nasal cannula when I assessed him with an O2 saturation of 92-93%.    Review of Systems   All pertinent negatives and positives are as above. All other systems have been reviewed and are negative unless otherwise stated.     Objective    Temp:  [97.6 °F (36.4 °C)-98 °F (36.7 °C)] 97.6 °F (36.4 °C)  Heart Rate:  [61-92] 92  Resp:  [16-22] 20  BP: (114-124)/(58-73) 114/59  Physical Exam  Vitals reviewed.   Constitutional:       General: He is not in acute distress.     Appearance: He is not ill-appearing.   HENT:      Head: Normocephalic.      Nose:      Comments: No active epistaxis on exam this morning but clotted areas and old blood stain noted to both narea (R>L this AM) - it was the opposite yesterday in AM     Mouth/Throat:      Mouth: Mucous membranes are moist.   Eyes:      Pupils: Pupils are equal, round, and reactive to light.   Cardiovascular:      Rate and Rhythm: Normal rate.   Pulmonary:      Effort: Pulmonary effort is normal.      Comments: Scattered coarse, crackly BSs on 10LNC during my assessment with 02 sats 92-93% - he had just returned to bed " shortly before my arrival  Musculoskeletal:         General: No deformity.   Skin:     General: Skin is warm.   Neurological:      Mental Status: He is alert.      Motor: Weakness present.   Psychiatric:         Mood and Affect: Mood normal.       Results Review:  I have reviewed the labs, radiology results, and diagnostic studies.    Laboratory Data:   Results from last 7 days   Lab Units 09/29/24  0400 09/26/24  0449   WBC 10*3/mm3 7.00 8.52   HEMOGLOBIN g/dL 10.9* 11.3*   HEMATOCRIT % 37.1* 37.0*   PLATELETS 10*3/mm3 159 150        Results from last 7 days   Lab Units 09/29/24  0400 09/28/24  0221 09/27/24  0957   SODIUM mmol/L 140 141 137   POTASSIUM mmol/L 4.2 4.3 4.0   CHLORIDE mmol/L 103 106 99   CO2 mmol/L 25.0 26.0 24.0   BUN mg/dL 42* 45* 47*   CREATININE mg/dL 2.09* 2.01* 2.18*   CALCIUM mg/dL 10.2 9.9 10.4   GLUCOSE mg/dL 103* 119* 242*       Culture Data:   Respiratory Culture   Date Value Ref Range Status   09/21/2024 Scant growth (1+) Normal Respiratory Sherrie  Final       Radiology Data:   Imaging Results (Last 24 Hours)       ** No results found for the last 24 hours. **            I have reviewed the patient's current medications.     Assessment/Plan   Assessment  Active Hospital Problems    Diagnosis     **Acute on chronic respiratory failure with hypoxia     Acute-on-chronic respiratory failure     Pneumonitis     Bacterial pneumonia     COVID-19 virus infection     Chronic heart failure with preserved ejection fraction (HFpEF)     COPD (chronic obstructive pulmonary disease)     A-fib     Pulmonary fibrosis     Stage 3b chronic kidney disease     Malignant neoplasm of upper lobe of right lung     Essential hypertension        Treatment Plan  Repeat CXR today  Patient was on 10LNC when I assessed him this morning  SW consulted - anticipate patient will need concentrator capable of 10L nasal cannula with exertion at home.  Prior to this hospitalization patient required 2.5-3LNC chronically in the  "outpatient setting  ENT assessment: we have tried humidified 02, saline nasal spray and lubricating gel to his nares, but thus far without relief of his symptoms.  He is adamant that his \"stopped up nose\" is contributing to his worsening symptoms recently  Patient finished 7 days of IV Cefepime  Pulmonary following and appreciate their assistance  Diuretics on hold - creatinine stable  Repeat labs including proBNP and procalcitonin in AM  10.  Patient initially tested positive for COVID-19 on 9/5/2024.  He was subsequently hospitalized from 9/5 through 9/8.  He was discharged home with prescription for dexamethasone-completed a full course of dexamethasone in the outpatient setting.  He also received 2 days of remdesivir during his previous hospitalization per discharge summary on 9/8/2024.  He is now out of enhanced contact and droplet precautions     11.  Continue Duonebs  12.  Continue Symbicort  13.  Continue incentive spirometry and flutter valve  14.  PT and OT; patient indicated that he lives at home alone.  He states that he has a brother that lives a couple of miles away from him.  He really wants to return home at discharge and is agreeable to , but his 02 requirement needs to be more stable  15.  Dispo: he's not ready for discharge today    Medical Decision Making  Number and Complexity of problems: 2 acute; moderate complexity      Conditions and Status        Condition is unchanged.     [facility] Data  External documents reviewed: none  Cardiac tracing (EKG, telemetry) interpretation: no new EKGs this AM  Radiology interpretation: no new radiology studies; new CXR pending  Labs reviewed: as above  Any tests that were considered but not ordered: none     Decision rules/scores evaluated (example [id number], Wells, etc): none     Discussed with: patient     Care Planning  Shared decision making: Discussed with patient with agreement to proceed with treatment plan as outlined  Code status and discussions: " Full code    Disposition  Social Determinants of Health that impact treatment or disposition: None apparent at this time; patient does live at home alone  I expect the patient to be discharged to home with home health (possibly) when 02 requirement has improved (still requiring 8-10LNC with even minimal activity)    Electronically signed by Prasanth Stewart MD, 09/30/24, 11:19 CDT.

## 2024-09-30 NOTE — PLAN OF CARE
Goal Outcome Evaluation:  Plan of Care Reviewed With: patient        Progress: no change  Outcome Evaluation: No co pain. He is alert and oriented. He is on 6L HFNC. Chest xray done. ENT consulted. Stated on nasal spary to help with congestion. Cont to monitor.

## 2024-09-30 NOTE — CASE MANAGEMENT/SOCIAL WORK
Continued Stay Note   [place]     Patient Name: Karoline Praetr  MRN: [id number]  Today's Date: 9/30/2024    Admit Date: 9/19/2024    Plan: Home Health   Discharge Plan       Row Name 09/30/24 1122       Plan    Plan Comments RACHEL spoke to Barrie at [facility] [phone] about getting pt an 02 tank that goes up to 10L.  She states this can be done today with an order.  RACHEL has gotten order and faxed to [facility].  Awaiting HH orders to arrange HH.                   Discharge Codes    No documentation.                 Expected Discharge Date and Time       Expected Discharge Date Expected Discharge Time    Sep 28, 2024               NEREYDA YeungW

## 2024-09-30 NOTE — TELEPHONE ENCOUNTER
Hub staff attempted to follow warm transfer process and was unsuccessful     Caller: [facility]    Relationship to patient: [facility]    Best call back number:  [phone]     Patient is needing: DR MARQUEZ IS NEEDING A CONSULT. PLEASE GIVE [facility] A CALL BACK.

## 2024-09-30 NOTE — PLAN OF CARE
Problem: Adult Inpatient Plan of Care  Goal: Plan of Care Review  Outcome: Progressing  Flowsheets (Taken 9/30/2024 0523)  Outcome Evaluation: A&OX4. VSS. Increased oxygen. O2 92-95% @8L HFNC. Crackles noted in LLL and RLL. Bed alarm in place. Safety maintained. Will continue to monitor.  73-95  Goal: Patient-Specific Goal (Individualized)  Outcome: Progressing  Goal: Absence of Hospital-Acquired Illness or Injury  Outcome: Progressing  Intervention: Identify and Manage Fall Risk  Recent Flowsheet Documentation  Taken 9/30/2024 0500 by Gladis Davis RN  Safety Promotion/Fall Prevention: safety round/check completed  Taken 9/30/2024 0400 by Gladis Davis RN  Safety Promotion/Fall Prevention: safety round/check completed  Taken 9/30/2024 0153 by Gladis Davis RN  Safety Promotion/Fall Prevention: safety round/check completed  Taken 9/29/2024 2352 by Gladis Davis RN  Safety Promotion/Fall Prevention: safety round/check completed  Taken 9/29/2024 2200 by Gladis Davis RN  Safety Promotion/Fall Prevention: safety round/check completed  Taken 9/29/2024 2100 by Gladis Davis RN  Safety Promotion/Fall Prevention: safety round/check completed  Taken 9/29/2024 2000 by Gladis Davis RN  Safety Promotion/Fall Prevention: safety round/check completed  Intervention: Prevent Skin Injury  Recent Flowsheet Documentation  Taken 9/29/2024 2000 by Gladis Davis RN  Body Position: position changed independently  Skin Protection:   adhesive use limited   incontinence pads utilized   protective footwear used   silicone foam dressing in place   skin-to-skin areas padded   tubing/devices free from skin contact  Intervention: Prevent and Manage VTE (Venous Thromboembolism) Risk  Recent Flowsheet Documentation  Taken 9/29/2024 2000 by Gladis Davis RN  Activity Management: activity encouraged  VTE Prevention/Management:   sequential compression devices off   patient refused intervention  Range of Motion: active ROM (range of  motion) encouraged  Intervention: Prevent Infection  Recent Flowsheet Documentation  Taken 9/29/2024 2000 by Gladis Davis RN  Infection Prevention:   single patient room provided   rest/sleep promoted  Goal: Optimal Comfort and Wellbeing  Outcome: Progressing  Intervention: Provide Person-Centered Care  Recent Flowsheet Documentation  Taken 9/29/2024 2000 by Gladis Davis RN  Trust Relationship/Rapport:   care explained   questions answered   questions encouraged  Goal: Readiness for Transition of Care  Outcome: Progressing   Goal Outcome Evaluation:              Outcome Evaluation: A&OX4. VSS. Increased oxygen. O2 92-95% @8L HFNC. Crackles noted in LLL and RLL. Bed alarm in place. Safety maintained. Will continue to monitor.  73-95

## 2024-10-01 LAB
ANION GAP SERPL CALCULATED.3IONS-SCNC: 12 MMOL/L (ref 5–15)
BUN SERPL-MCNC: 37 MG/DL (ref 8–23)
BUN/CREAT SERPL: 23.3 (ref 7–25)
CALCIUM SPEC-SCNC: 10 MG/DL (ref 8.6–10.5)
CHLORIDE SERPL-SCNC: 106 MMOL/L (ref 98–107)
CO2 SERPL-SCNC: 18 MMOL/L (ref 22–29)
CREAT SERPL-MCNC: 1.59 MG/DL (ref 0.76–1.27)
DEPRECATED RDW RBC AUTO: 60.3 FL (ref 37–54)
EGFRCR SERPLBLD CKD-EPI 2021: 43.1 ML/MIN/1.73
ERYTHROCYTE [DISTWIDTH] IN BLOOD BY AUTOMATED COUNT: 17.3 % (ref 12.3–15.4)
GLUCOSE SERPL-MCNC: 130 MG/DL (ref 65–99)
HCT VFR BLD AUTO: 37.3 % (ref 37.5–51)
HGB BLD-MCNC: 11.1 G/DL (ref 13–17.7)
MCH RBC QN AUTO: 28.5 PG (ref 26.6–33)
MCHC RBC AUTO-ENTMCNC: 29.8 G/DL (ref 31.5–35.7)
MCV RBC AUTO: 95.6 FL (ref 79–97)
NT-PROBNP SERPL-MCNC: 1967 PG/ML (ref 0–1800)
PLATELET # BLD AUTO: 182 10*3/MM3 (ref 140–450)
PMV BLD AUTO: 12.1 FL (ref 6–12)
POTASSIUM SERPL-SCNC: 4.8 MMOL/L (ref 3.5–5.2)
PROCALCITONIN SERPL-MCNC: 0.18 NG/ML (ref 0–0.25)
RBC # BLD AUTO: 3.9 10*6/MM3 (ref 4.14–5.8)
SODIUM SERPL-SCNC: 136 MMOL/L (ref 136–145)
WBC NRBC COR # BLD AUTO: 7.31 10*3/MM3 (ref 3.4–10.8)

## 2024-10-01 PROCEDURE — 99232 SBSQ HOSP IP/OBS MODERATE 35: CPT | Performed by: OTOLARYNGOLOGY

## 2024-10-01 PROCEDURE — 93010 ELECTROCARDIOGRAM REPORT: CPT | Performed by: INTERNAL MEDICINE

## 2024-10-01 PROCEDURE — 94799 UNLISTED PULMONARY SVC/PX: CPT

## 2024-10-01 PROCEDURE — 99233 SBSQ HOSP IP/OBS HIGH 50: CPT | Performed by: INTERNAL MEDICINE

## 2024-10-01 PROCEDURE — 84145 PROCALCITONIN (PCT): CPT | Performed by: INTERNAL MEDICINE

## 2024-10-01 PROCEDURE — 97530 THERAPEUTIC ACTIVITIES: CPT

## 2024-10-01 PROCEDURE — 97164 PT RE-EVAL EST PLAN CARE: CPT

## 2024-10-01 PROCEDURE — 83880 ASSAY OF NATRIURETIC PEPTIDE: CPT | Performed by: INTERNAL MEDICINE

## 2024-10-01 PROCEDURE — 93005 ELECTROCARDIOGRAM TRACING: CPT | Performed by: INTERNAL MEDICINE

## 2024-10-01 PROCEDURE — 94761 N-INVAS EAR/PLS OXIMETRY MLT: CPT

## 2024-10-01 PROCEDURE — 85027 COMPLETE CBC AUTOMATED: CPT | Performed by: INTERNAL MEDICINE

## 2024-10-01 PROCEDURE — 94664 DEMO&/EVAL PT USE INHALER: CPT

## 2024-10-01 PROCEDURE — 80048 BASIC METABOLIC PNL TOTAL CA: CPT | Performed by: INTERNAL MEDICINE

## 2024-10-01 RX ADMIN — FLUTICASONE PROPIONATE 2 SPRAY: 50 SPRAY, METERED NASAL at 08:50

## 2024-10-01 RX ADMIN — SODIUM BICARBONATE 650 MG: 650 TABLET ORAL at 08:47

## 2024-10-01 RX ADMIN — Medication 2 SPRAY: at 16:46

## 2024-10-01 RX ADMIN — BUDESONIDE AND FORMOTEROL FUMARATE DIHYDRATE 2 PUFF: 160; 4.5 AEROSOL RESPIRATORY (INHALATION) at 06:06

## 2024-10-01 RX ADMIN — SALINE NASAL SPRAY 2 SPRAY: 1.5 SOLUTION NASAL at 11:39

## 2024-10-01 RX ADMIN — SALINE NASAL SPRAY 2 SPRAY: 1.5 SOLUTION NASAL at 06:23

## 2024-10-01 RX ADMIN — SODIUM BICARBONATE 650 MG: 650 TABLET ORAL at 20:37

## 2024-10-01 RX ADMIN — SALINE NASAL SPRAY 2 SPRAY: 1.5 SOLUTION NASAL at 20:37

## 2024-10-01 RX ADMIN — IPRATROPIUM BROMIDE AND ALBUTEROL SULFATE 3 ML: .5; 3 SOLUTION RESPIRATORY (INHALATION) at 19:14

## 2024-10-01 RX ADMIN — PANTOPRAZOLE SODIUM 40 MG: 40 TABLET, DELAYED RELEASE ORAL at 08:47

## 2024-10-01 RX ADMIN — Medication 10 ML: at 20:37

## 2024-10-01 RX ADMIN — ISOSORBIDE MONONITRATE 30 MG: 30 TABLET, EXTENDED RELEASE ORAL at 11:38

## 2024-10-01 RX ADMIN — Medication 2 SPRAY: at 20:37

## 2024-10-01 RX ADMIN — GUAIFENESIN 600 MG: 600 TABLET, EXTENDED RELEASE ORAL at 20:36

## 2024-10-01 RX ADMIN — SALINE NASAL SPRAY 2 SPRAY: 1.5 SOLUTION NASAL at 17:58

## 2024-10-01 RX ADMIN — Medication 10 MG: at 20:37

## 2024-10-01 RX ADMIN — CETIRIZINE HYDROCHLORIDE 10 MG: 10 TABLET ORAL at 08:47

## 2024-10-01 RX ADMIN — IPRATROPIUM BROMIDE AND ALBUTEROL SULFATE 3 ML: .5; 3 SOLUTION RESPIRATORY (INHALATION) at 06:00

## 2024-10-01 RX ADMIN — GUAIFENESIN 600 MG: 600 TABLET, EXTENDED RELEASE ORAL at 08:48

## 2024-10-01 RX ADMIN — SALINE NASAL SPRAY 2 SPRAY: 1.5 SOLUTION NASAL at 14:14

## 2024-10-01 RX ADMIN — SODIUM BICARBONATE 650 MG: 650 TABLET ORAL at 16:46

## 2024-10-01 RX ADMIN — EMPAGLIFLOZIN 10 MG: 10 TABLET, FILM COATED ORAL at 08:47

## 2024-10-01 RX ADMIN — ATORVASTATIN CALCIUM 20 MG: 10 TABLET, FILM COATED ORAL at 20:39

## 2024-10-01 RX ADMIN — BUDESONIDE AND FORMOTEROL FUMARATE DIHYDRATE 2 PUFF: 160; 4.5 AEROSOL RESPIRATORY (INHALATION) at 19:15

## 2024-10-01 RX ADMIN — Medication 10 ML: at 08:50

## 2024-10-01 RX ADMIN — IPRATROPIUM BROMIDE AND ALBUTEROL SULFATE 3 ML: .5; 3 SOLUTION RESPIRATORY (INHALATION) at 14:33

## 2024-10-01 RX ADMIN — METOPROLOL SUCCINATE 25 MG: 25 TABLET, EXTENDED RELEASE ORAL at 08:47

## 2024-10-01 RX ADMIN — IPRATROPIUM BROMIDE AND ALBUTEROL SULFATE 3 ML: .5; 3 SOLUTION RESPIRATORY (INHALATION) at 10:13

## 2024-10-01 RX ADMIN — Medication 2 SPRAY: at 08:50

## 2024-10-01 RX ADMIN — ACETAMINOPHEN 1000 MG: 500 TABLET, FILM COATED ORAL at 20:36

## 2024-10-01 RX ADMIN — TAMSULOSIN HYDROCHLORIDE 0.4 MG: 0.4 CAPSULE ORAL at 20:37

## 2024-10-01 RX ADMIN — ASPIRIN 81 MG: 81 TABLET, COATED ORAL at 08:47

## 2024-10-01 NOTE — PLAN OF CARE
Goal Outcome Evaluation:  Plan of Care Reviewed With: patient        Progress: declining  Outcome Evaluation: PT completed re-eval for time. He is alert and oriented. he continues with SOB and labored breathing with all activity. He significantly desats with all activity. Today we stood and t.f to the bedside chair whre he desat to 72% on 7L/High [place] O2. Required 5-10 minutes of a sitting rest to regain O2 sat to 91%. He is not safe or ready to d.c home alone and Mr. Prater agrees. PT will cont with activity tolerance, balance, endurance trainging and functional mobility. Recommend placement for rehab effots and medical recovery.      Anticipated Discharge Disposition (PT): sub acute care setting, skilled nursing facility

## 2024-10-01 NOTE — PROGRESS NOTES
[facility] Otolaryngology Head and Neck Surgery  INPATIENT PROGRESS NOTE    Patient Name: Karoline Prater  : 1940   MRN: [id number]  Date of Admission: 2024  Today's Date: 10/01/24  Length of Stay: 11  [facility] 4B   ROOM: [facility]   Wilber Carbajal,*   Primary Care Physician: Irving Alexis MD  Surgical Procedures Since Admission:    Subjective   Subjective   Chief Complaint: Epistaxis, nasal congestion  Shortness of Breath       Accompanied by: No one  Since last examined, Karoline Prater has remained stable overnight.  He has had no nasal bleeding.  He says his right nostril is more clear.  His left nostril remains obstructed.  Patient is seen, chart is reviewed    Review of Systems   Respiratory:  Positive for shortness of breath.       No change from prior inquiry  All pertinent negatives and positives are as above. All other systems have been reviewed and are negative unless otherwise stated.   Objective   Objective   Vitals:   Temp:  [97.3 °F (36.3 °C)-98.1 °F (36.7 °C)] 97.3 °F (36.3 °C)  Heart Rate:  [] 58  Resp:  [20-24] 20  BP: (121-142)/(56-74) 129/56  Flow (L/min):  [6-8] 7    Physical Exam  Vitals reviewed.   Constitutional:       General: He is not in acute distress.     Appearance: Normal appearance. He is well-developed and normal weight. He is not ill-appearing.      Comments: Sitting up in bed, about to eat lunch, wearing nasal O2   HENT:      Head: Atraumatic.      Comments: Mild temporal wasting     Right Ear: External ear normal. Decreased hearing noted.      Left Ear: External ear normal. Decreased hearing noted.      Nose: Nose normal. No mucosal edema.      Right Nostril: No epistaxis or occlusion (Minimal crusting present).      Left Nostril: Occlusion (With crusting) present. No epistaxis.      Comments: Septum-right septum with mild layer of crusting  Left septum with obstruction related to crust in the nasal passage  Extremely  dry mucosa     Mouth/Throat:      Lips: Pink.      Mouth: Mucous membranes are dry.      Dentition: Abnormal dentition. No gum lesions.      Tongue: No lesions. Tongue does not deviate from midline.      Palate: No mass and lesions.      Pharynx: Oropharynx is clear.      Comments: Dry oral and pharyngeal mucosa  Eyes:      General: Lids are normal. Gaze aligned appropriately.      Extraocular Movements: Extraocular movements intact.      Conjunctiva/sclera: Conjunctivae normal.   Neck:      Thyroid: No thyroid mass or thyromegaly.      Comments: Atrophic musculature  Pulmonary:      Effort: Pulmonary effort is normal. No tachypnea, respiratory distress or retractions.      Breath sounds: No stridor.      Comments: Nasal O2  Musculoskeletal:      Cervical back: No rigidity or crepitus. Decreased range of motion.   Lymphadenopathy:      Cervical: No cervical adenopathy.   Skin:     Findings: No rash.   Neurological:      General: No focal deficit present.      Mental Status: He is alert and oriented to person, place, and time.      Cranial Nerves: Cranial nerve deficit present.      Comments: Decreased hearing   Psychiatric:         Attention and Perception: Attention and perception normal.         Speech: Speech normal.         Behavior: Behavior normal. Behavior is cooperative.         Thought Content: Thought content normal.         Cognition and Memory: Cognition normal.         Judgment: Judgment normal.           Result Review    Result Review:  I have personally reviewed the results from the time of this admission to 10/1/2024 13:54 CDT and agree with these findings:  []  Laboratory  []  Microbiology  []  Radiology  []  EKG/Telemetry   []  Cardiology/Vascular   []  Pathology  []  Old records  []  Other:  Most notable findings include: No labs pertinent to ENT    Assessment & Plan   Assessment / Plan   Brief Patient Summary:  Karoline Prater is a 84 y.o. male who remained stable.  He has had no further  episodes of epistaxis.  He does report continued crusting, light greater than left nasal passage.  I will continue nasal hygiene at this time.    Active Hospital Problems:   Active Hospital Problems    Diagnosis     **Acute on chronic respiratory failure with hypoxia     Epistaxis     Chronic rhinitis     Acute-on-chronic respiratory failure     Pneumonitis     Bacterial pneumonia     COVID-19 virus infection     Chronic heart failure with preserved ejection fraction (HFpEF)     COPD (chronic obstructive pulmonary disease)     A-fib     Pulmonary fibrosis     Stage 3b chronic kidney disease     Malignant neoplasm of upper lobe of right lung     Essential hypertension      Plan:   Epistaxis-patient has no further episodes of epistaxis.  Chronic rhinitis-patient has severely dry nasal mucosa related to high flow oxygen.  He should continue medication.  Nasal congestion-the patient continues with left nasal crusting.  To avoid trauma the nasal mucosa, will continue nasal saline at this point in time.  This should work over a period of time.  Discussed Plan with patient  Following patient as in-patient. Further recommendations will be made based on serial examinations.    Medications/Orders for this encounter: No new medications ordered.  No New orders written.     Discharge Planning: Per primary team        VTE Prophylaxis:  Mechanical VTE prophylaxis orders are present.         Electronically signed by Vijay James Jr, MD, 10/01/24, 1:54 PM CDT.

## 2024-10-01 NOTE — THERAPY RE-EVALUATION
Patient Name: Karoline Prater  : 1940    MRN: [id number]                              Today's Date: 10/1/2024       Admit Date: 2024    Visit Dx:     ICD-10-CM ICD-9-CM   1. Hypoxia  R09.02 799.02   2. Pneumonitis  J98.4 486   3. Acute UTI  N39.0 599.0   4. Impaired mobility [Z74.09]  Z74.09 799.89   5. Chronic obstructive pulmonary disease, unspecified COPD type  J44.9 496     Patient Active Problem List   Diagnosis    Dyspnea    Essential hypertension    NSVT (nonsustained ventricular tachycardia)    Malignant neoplasm of upper lobe of right lung    Stage 3b chronic kidney disease    Pancytopenia due to antineoplastic chemotherapy    Pneumonia due to infectious organism    Function kidney decreased    Cellulitis    Acute on chronic respiratory failure with hypoxia    Pulmonary fibrosis    Macrocytic anemia    Thrombocytopenia    Sepsis    Right pneumothorax    Hyperkalemia    Acute kidney injury superimposed on CKD stage 3b    GERD without esophagitis    A-fib    Former smoker    Chest pain    Tachycardia    Bilateral lower extremity edema    Metastatic adenocarcinoma of the RUL    Chronic diastolic congestive heart failure    COPD (chronic obstructive pulmonary disease)    S/P radiation > 12 weeks    CHF (congestive heart failure)    History of radiation therapy    Chronic heart failure with preserved ejection fraction (HFpEF)    Narrow complex tachycardia    Atrial flutter    Encounter for examination for normal comparison and control in clinical research program    CHF exacerbation    COVID-19 virus infection    Cytokine release syndrome, grade 2    Bacterial pneumonia    Hyperlipidemia    Coronary artery disease    Pneumonitis    Acute-on-chronic respiratory failure    Epistaxis    Chronic rhinitis     Past Medical History:   Diagnosis Date    Arthritis     Atrial fibrillation with rapid ventricular response 2019    Blindness of left eye     BPH with obstruction/lower urinary tract  symptoms     Cancer     stomach & lung    CHF (congestive heart failure)     CKD (chronic kidney disease) stage 3, GFR 30-59 ml/min     COPD with acute exacerbation 08/03/2024    Coronary artery disease     Elevated cholesterol     Essential hypertension 10/02/2017    Fibrotic lung diseases     GERD (gastroesophageal reflux disease)     Hearing loss     History of transfusion     Hydronephrosis of left kidney     Hyperlipidemia     Hypertension     pt was taken off of all of his medications for BP (atenolol, lisinopril, lasix) because his BP kept bottoming out so his primary dr told him to discontinue them 1-2 months ago (Jan/Feb 2019). pt states he takes no medications currently.    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 Patel is just going to keep an eye on it and not do surgery right now.    Mediastinal adenopathy 10/24/2018    [facility]    Monoclonal gammopathy of unknown significance (MGUS) 09/11/2018    Pancreatic mass     pt states he had this in 2013 but it went away on its own. Now recent CT shows it has come back so he is going to have an ultrasound on 3/13/19.    Shortness of breath      Past Surgical History:   Procedure Laterality Date    ABDOMINAL SURGERY      BRONCHOSCOPY N/A 10/24/2018    Procedure: BRONCHOSCOPY WITH BIOPSY, EBUS;  Surgeon: Gareth Becerra MD;  Location: [facility] OR;  Service: Pulmonary    CHOLECYSTECTOMY      COLONOSCOPY N/A 1/3/2019    Procedure: COLONOSCOPY WITH ANESTHESIA;  Surgeon: Randy Somers DO;  Location: [facility] ENDOSCOPY;  Service: Gastroenterology    CYSTOSCOPY, RETROGRADE PYELOGRAM AND STENT INSERTION Left 3/8/2019    Procedure: CYSTOSCOPY RETROGRADE BILATERAL PYELOGRAM;  Surgeon: Jos Sylvester MD;  Location: [facility] OR;  Service: Urology    ENDOSCOPY N/A 12/11/2018    Procedure:  ESOPHAGOGASTRODUODENOSCOPY WITH ANESTHESIA;  Surgeon: Randy Somers DO;  Location: [facility] ENDOSCOPY;  Service: Gastroenterology    ENDOSCOPY N/A 4/29/2019    Procedure: ESOPHAGOGASTRODUODENOSCOPY WITH ANESTHESIA;  Surgeon: Lilliam Jj MD;  Location: [facility] OR;  Service: Gastroenterology    ENDOSCOPY N/A 5/9/2019    Procedure: ESOPHAGOGASTRODUODENOSCOPY WITH ANESTHESIA;  Surgeon: Pilo Bansal MD;  Location: [facility] ENDOSCOPY;  Service: Gastroenterology    EYE SURGERY Left 1964    FOOT SURGERY Right 1966    joint    FRACTURE SURGERY      PACEMAKER IMPLANTATION Left 8/29/2024    Procedure: Leadless pacemaker implant and AVN ablation;  Surgeon: Carlitos Bowen MD;  Location: [facility] CATH INVASIVE LOCATION;  Service: Cardiovascular;  Laterality: Left;      General Information       Row Name 10/01/24 1102          Physical Therapy Time and Intention    Document Type re-evaluation  -LEIDY     Mode of Treatment physical therapy  -LEIDY       Row Name 10/01/24 1102          General Information    Patient Profile Reviewed yes  -LEIDY     Existing Precautions/Restrictions fall;oxygen therapy device and L/min  -LEIDY     Barriers to Rehab medically complex;previous functional deficit  -LEIDY       Row Name 10/01/24 1102          Cognition    Orientation Status (Cognition) oriented to;person;place;situation  -LEIDY       Row Name 10/01/24 1102          Safety Issues, Functional Mobility    Impairments Affecting Function (Mobility) balance;endurance/activity tolerance;strength;shortness of breath  -LEIDY               User Key  (r) = Recorded By, (t) = Taken By, (c) = Cosigned By      Initials Name Provider Type    LEIDY Cristian Hirsch, PT DPT Physical Therapist                   Mobility       Row Name 10/01/24 1102          Bed Mobility    Bed Mobility supine-sit;sit-supine  -LEIDY     Supine-Sit [place] (Bed Mobility) standby assist  -LEIDY     Sit-Supine [place] (Bed Mobility) not tested  -LEIDY     Assistive Device (Bed Mobility)  head of bed elevated;bed rails  -LEIDY       Row Name 10/01/24 1102          Bed-Chair Transfer    Bed-Chair [place] (Transfers) contact guard  -LEIDY       Row Name 10/01/24 1102          Sit-Stand Transfer    Sit-Stand [place] (Transfers) contact guard  -LEIDY               User Key  (r) = Recorded By, (t) = Taken By, (c) = Cosigned By      Initials Name Provider Type    Cristian Coello PT DPJOEL Physical Therapist                   Obj/Interventions       Row Name 10/01/24 1102          Range of Motion Comprehensive    General Range of Motion bilateral lower extremity ROM WFL  -LEIDY       Row Name 10/01/24 1102          Strength Comprehensive (MMT)    Comment, General Manual Muscle Testing (MMT) Assessment B LE 4-/5  -LEIDY       Row Name 10/01/24 1102          Balance    Balance Assessment sitting dynamic balance;standing dynamic balance  -LEIDY     Dynamic Sitting Balance supervision  -LEIDY     Position, Sitting Balance unsupported;sitting in chair;sitting edge of bed  -LEIDY     Dynamic Standing Balance contact guard  -LEIDY     Position/Device Used, Standing Balance supported  -LEIDY       Row Name 10/01/24 1102          Sensory Assessment (Somatosensory)    Sensory Assessment (Somatosensory) other (see comments)  numbness in distal B LE and foot.  -LEIDY               User Key  (r) = Recorded By, (t) = Taken By, (c) = Cosigned By      Initials Name Provider Type    Cristian Coello PT DPT Physical Therapist                   Goals/Plan       Row Name 10/01/24 1102          Bed Mobility Goal 1 (PT)    Activity/Assistive Device (Bed Mobility Goal 1, PT) sit to supine/supine to sit  -LEIDY     [place] Level/Cues Needed (Bed Mobility Goal 1, PT) supervision required  -LEIDY     Time Frame (Bed Mobility Goal 1, PT) long term goal (LTG);10 days  -LEIDY     Progress/Outcomes (Bed Mobility Goal 1, PT) good progress toward goal;goal ongoing  -LEIDY       Row Name 10/01/24 1102          Transfer Goal 1 (PT)    Activity/Assistive Device  (Transfer Goal 1, PT) sit-to-stand/stand-to-sit;bed-to-chair/chair-to-bed  -LEIDY     [place] Level/Cues Needed (Transfer Goal 1, PT) supervision required  -LEIDY     Time Frame (Transfer Goal 1, PT) long term goal (LTG);10 days  -LEIDY     Progress/Outcome (Transfer Goal 1, PT) goal not met;goal ongoing  -LEIDY       Row Name 10/01/24 1102          Gait Training Goal 1 (PT)    Activity/Assistive Device (Gait Training Goal 1, PT) gait (walking locomotion);assistive device use;decrease fall risk;improve balance and speed;increase endurance/gait distance;walker, rolling  -LEIDY     [place] Level (Gait Training Goal 1, PT) supervision required  -LEIDY     Distance (Gait Training Goal 1, PT) 60 ft w O2 sat 90% or greater on scheduled O2  -LEIDY     Time Frame (Gait Training Goal 1, PT) long term goal (LTG);10 days  -LEIDY     Progress/Outcome (Gait Training Goal 1, PT) goal not met;goal ongoing  -ELIDY       Row Name 10/01/24 1102          Therapy Assessment/Plan (PT)    Planned Therapy Interventions (PT) bed mobility training;transfer training;gait training;balance training;home exercise program;patient/family education;postural re-education;strengthening  -LEIDY               User Key  (r) = Recorded By, (t) = Taken By, (c) = Cosigned By      Initials Name Provider Type    Cristian Coello, PT DPT Physical Therapist                   Clinical Impression       Row Name 10/01/24 1102          Pain    Pretreatment Pain Rating 0/10 - no pain  -LEIDY       Row Name 10/01/24 1102          Plan of Care Review    Plan of Care Reviewed With patient  -LEIDY     Progress declining  -LEIDY     Outcome Evaluation PT completed re-eval for time. He is alert and oriented. he continues with SOB and labored breathing with all activity. He significantly desats with all activity. Today we stood and t.f to the bedside chair whre he desat to 72% on 7L/[place] O2. Required 5-10 minutes of a sitting rest to regain O2 sat to 91%. He is not safe or ready to d.c home  alone and Mr. Prater agrees. PT will cont with activity tolerance, balance, endurance trainging and functional mobility. Recommend placement for rehab effots and medical recovery.  -LEIDY       Row Name 10/01/24 1102          Therapy Assessment/Plan (PT)    Patient/Family Therapy Goals Statement (PT) did not state  -LEIDY     Rehab Potential (PT) fair, will monitor progress closely  -LEIDY     Criteria for Skilled Interventions Met (PT) yes;meets criteria;skilled treatment is necessary  -LEIDY     Therapy Frequency (PT) 2 times/day  -LEIDY     Predicted Duration of Therapy Intervention (PT) until d.,c  -LEIDY       Row Name 10/01/24 1102          Vital Signs    Pre SpO2 (%) 90  -LEIDY     O2 Delivery Pre Treatment hi-flow  -LEIDY     Intra SpO2 (%) 72   -LEIDY     O2 Delivery Intra Treatment hi-flow  -LEIDY     Post SpO2 (%) 91  -LEIDY     O2 Delivery Post Treatment hi-flow  -LEIDY     Pre Patient Position Supine  -LEIDY     Intra Patient Position Standing  -LEIDY     Post Patient Position Sitting  -LEIDY       Row Name 10/01/24 1102          Positioning and Restraints    Pre-Treatment Position in bed  -LEIDY     Post Treatment Position chair  -LEIDY     In Chair notified nsg;reclined;call light within reach;encouraged to call for assist;RUE elevated;LUE elevated;legs elevated  -LEIDY               User Key  (r) = Recorded By, (t) = Taken By, (c) = Cosigned By      Initials Name Provider Type    LEIDY Cristian Hirsch, PT DPT Physical Therapist                   Outcome Measures       Row Name 10/01/24 1102 10/01/24 0847       How much help from another person do you currently need...    Turning from your back to your side while in flat bed without using bedrails? 3  -LEIDY 4  -AA    Moving from lying on back to sitting on the side of a flat bed without bedrails? 3  -LEIDY 4  -AA    Moving to and from a bed to a chair (including a wheelchair)? 3  -LEIDY 3  -AA    Standing up from a chair using your arms (e.g., wheelchair, bedside chair)? 3  -LEIDY 3  -AA    Climbing 3-5 steps with a  railing? 1  -LEIDY 2  -AA    To walk in hospital room? 3  -LEIDY 3  -AA    AM-PAC 6 Clicks Score (PT) 16  -LEIDY 19  -AA    Highest Level of Mobility Goal 5 --> Static standing  -LEIDY 6 --> Walk 10 steps or more  -      Row Name 10/01/24 1102          Functional Assessment    Outcome Measure Options AM-PAC 6 Clicks Basic Mobility (PT)  -LEIDY               User Key  (r) = Recorded By, (t) = Taken By, (c) = Cosigned By      Initials Name Provider Type    Cristian Coello, PT DPT Physical Therapist    Lena Zaho, RN Registered Nurse                                 Physical Therapy Education       Title: PT OT SLP Therapies (In Progress)       Topic: Physical Therapy (In Progress)       Point: Mobility training (Done)       Learning Progress Summary             Patient Acceptance, E, VU,NR by LEIDY at 10/1/2024 1102    Comment: benefits of activity, progression of PT    Acceptance, E,TB, VU by  at 9/25/2024 0755    Acceptance, E,TB, VU by  at 9/24/2024 0745    Acceptance, E, VU,DU,NR by LEIDY at 9/20/2024 1400    Comment: benefits of activity, progression of PT POC                         Point: Home exercise program (Not Started)       Learner Progress:  Not documented in this visit.              Point: Body mechanics (Not Started)       Learner Progress:  Not documented in this visit.              Point: Precautions (Not Started)       Learner Progress:  Not documented in this visit.                              User Key       Initials Effective Dates Name Provider Type Discipline    LEIDY 02/03/23 -  Cristian Hirsch, PT DPT Physical Therapist PT     06/19/24 -  Rosa Aiken, RN Registered Nurse Nurse                  PT Recommendation and Plan  Planned Therapy Interventions (PT): bed mobility training, transfer training, gait training, balance training, home exercise program, patient/family education, postural re-education, strengthening  Plan of Care Reviewed With: patient  Progress: declining  Outcome  Evaluation: PT completed re-eval for time. He is alert and oriented. he continues with SOB and labored breathing with all activity. He significantly desats with all activity. Today we stood and t.f to the bedside chair whre he desat to 72% on 7L/[place] O2. Required 5-10 minutes of a sitting rest to regain O2 sat to 91%. He is not safe or ready to d.c home alone and Mr. Prater agrees. PT will cont with activity tolerance, balance, endurance trainging and functional mobility. Recommend placement for rehab effots and medical recovery.     Time Calculation:         PT Charges       Row Name 10/01/24 1237             Time Calculation    Start Time 1102  -LEIDY      Stop Time 1141  -LEIDY      Time Calculation (min) 39 min  -LEIDY      PT Received On 10/01/24  -LEIDY      PT Goal Re-Cert Due Date 10/11/24  -LEIDY         Time Calculation- PT    Total Timed Code Minutes- PT 9 minute(s)  -LEIDY         Timed Charges    89296 - PT Therapeutic Activity Minutes 9  -LEIDY         Total Minutes    Timed Charges Total Minutes 9  -LEIDY       Total Minutes 9  -LEIDY                User Key  (r) = Recorded By, (t) = Taken By, (c) = Cosigned By      Initials Name Provider Type    Cristian Coello, PT DPT Physical Therapist                  Therapy Charges for Today       Code Description Service Date Service Provider Modifiers Qty    42295940425 HC PT THERAPEUTIC ACT EA 15 MIN 10/1/2024 Cristian Hirsch, PT DPT GP 1    91613833792 HC PT RE-EVAL ESTABLISHED PLAN 2 10/1/2024 Cristian Hirsch PT DPT GP 1            PT G-Codes  Outcome Measure Options: AM-PAC 6 Clicks Basic Mobility (PT)  AM-PAC 6 Clicks Score (PT): 16  PT Discharge Summary  Anticipated Discharge Disposition (PT): sub acute care setting, skilled nursing facility    Cristian Hirsch, SARAH DPT  10/1/2024

## 2024-10-01 NOTE — PROGRESS NOTES
[facility] Medicine Services  INPATIENT PROGRESS NOTE    Patient Name: Karoline Prater  Date of Admission: 9/19/2024  Today's Date: 10/01/24  Length of Stay: 11  Primary Care Physician: Irving Alexis MD    Subjective   Chief Complaint: Shortness of breath  HPI   Patient dyspneic at rest continues to require 7 to 10 L nasal cannula.  He has had epistaxis that is now resolved.    Review of Systems   All pertinent negatives and positives are as above. All other systems have been reviewed and are negative unless otherwise stated.     Objective    Temp:  [97.5 °F (36.4 °C)-98.1 °F (36.7 °C)] 97.5 °F (36.4 °C)  Heart Rate:  [] 56  Resp:  [18-24] 22  BP: (112-142)/(57-74) 142/57  Physical Exam  Constitutional:       General: He is not in acute distress.     Appearance: He is not ill-appearing.   HENT:      Head: Normocephalic.      Nose:      Comments: No active epistaxis, clot on both nosetrils      Mouth/Throat:      Mouth: Mucous membranes are moist.   Eyes:      Pupils: Pupils are equal, round, and reactive to light.   Cardiovascular:      Rate and Rhythm: Normal rate.   Pulmonary:      Effort: Pulmonary effort is normal.      Comments: Scattered coarse, crackly BSs on 7LNC during my assessment with 02 sats 92-93% - he had just returned to bed shortly before my arrival  Musculoskeletal:         General: No deformity.   Skin:     General: Skin is warm.   Neurological:      Mental Status: He is alert.      Motor: Weakness present.   Psychiatric:         Mood and Affect: Mood normal.     Results Review:  I have reviewed the labs, radiology results, and diagnostic studies.    Laboratory Data:   Results from last 7 days   Lab Units 10/01/24  0435 09/29/24  0400 09/26/24  0449   WBC 10*3/mm3 7.31 7.00 8.52   HEMOGLOBIN g/dL 11.1* 10.9* 11.3*   HEMATOCRIT % 37.3* 37.1* 37.0*   PLATELETS 10*3/mm3 182 159 150        Results from last 7 days   Lab Units 10/01/24  0339  "09/29/24  0400 09/28/24  0221   SODIUM mmol/L 136 140 141   POTASSIUM mmol/L 4.8 4.2 4.3   CHLORIDE mmol/L 106 103 106   CO2 mmol/L 18.0* 25.0 26.0   BUN mg/dL 37* 42* 45*   CREATININE mg/dL 1.59* 2.09* 2.01*   CALCIUM mg/dL 10.0 10.2 9.9   GLUCOSE mg/dL 130* 103* 119*       Culture Data:   No results found for: \"BLOODCX\", \"URINECX\", \"WOUNDCX\", \"MRSACX\", \"RESPCX\", \"STOOLCX\"    Radiology Data:   Imaging Results (Last 24 Hours)       Procedure Component Value Units Date/Time    XR Chest 1 View [[id number]] Collected: 09/30/24 1346     Updated: 09/30/24 1352    Narrative:      EXAMINATION: XR CHEST 1 VW- 9/30/2024 1:46 PM     HISTORY: follow-up hypoxemia.     REPORT: A frontal view of the chest was obtained.     COMPARISON: Chest x-ray 9/26/2024. CT chest without contrast 9/21/2024.     There are coarse reticular interstitial opacities throughout both lungs  similar to the previous study and likely related to advanced pulmonary  fibrosis. There is a superimposed area of stable masslike parenchymal  consolidation in the right upper lobe which is unchanged. There is  blunting of the right costophrenic angle which may be due to a small  pleural effusion or chronic pleural thickening. The right internal  jugular port catheter appears in good position as before. Heart size is  normal.       Impression:      Stable chest x-ray compared with the study from 4 days  earlier.     This report was signed and finalized on 9/30/2024 1:49 PM by Dr. Allen Churchill MD.               I have reviewed the patient's current medications.     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    Epistaxis  ENT input noted  Controlled currently  Not amenable to clot evacuation due to risk of recurrent epistaxis  Continue local measures    Chronic congestive diastolic heart failure with preserved ejection fraction  Diuresis as needed currently on hold  Patient had baseline weight  Daily weights    CKD stage III  BMP daily  Ins and outs    Deconditioning  PT and OT evaluations    Disposition  Patient would like to return home with home health care  He is At risk of readmissions as he has had frequent hospitalizations this year, recommended palliative care but patient does not seem amenable to consider other     Medical Decision Making  Number and Complexity of problems: 4 complex problems        Conditions and Status        Condition is unchanged.     MDM Data  External documents reviewed: none  Cardiac tracing (EKG, telemetry) interpretation: no new EKGs this AM  Radiology interpretation: no new radiology studies; chest x-ray 9/30/2024   Labs reviewed: as above  Any tests that were considered but not ordered: none     Decision rules/scores evaluated (example [id number], Wells, etc): none     Discussed with: patient     Care Planning  Shared decision making: Discussed with patient with agreement to proceed with treatment plan as outlined  Code status and discussions: Full code     Disposition  Social Determinants of Health that impact  treatment or disposition: None apparent at this time; patient does live at home alone  I expect the patient to be discharged to home with home health (possibly) when 02 requirement has improved (still requiring 8-10LNC with even minimal activity)    Electronically signed by Wilber Carbajal MD, 10/01/24, 09:33 CDT.

## 2024-10-01 NOTE — PLAN OF CARE
Goal Outcome Evaluation:  Plan of Care Reviewed With: patient           Outcome Evaluation: Follow up per protocol. Pt alone in room and no longer in COVID isolation. When asked about appetite, pt mentioned it was okay as he had some difficulty breathing yesterday. Pt noted being a selective eater and politely declined ONS offered. Pt did agree to try some chocolate milk for added nutrition. Pt further noted that he has never been a big eater. Pt was advised on available snacks and encouraged to eat well while in patient. Will monitor and make recommendations as appropriate.

## 2024-10-01 NOTE — PLAN OF CARE
Goal Outcome Evaluation:  Plan of Care Reviewed With: patient        Progress: no change  Outcome Evaluation: No co pain. He is on 7L high flow NC. He is SOA with any activity. Nasal spary given for congested nose. Possible home soon. Cont to monitor.

## 2024-10-01 NOTE — PROGRESS NOTES
[facility] PULMONARY PROGRESS NOTE - [facility]    Patient: Karoline Prater  1940   MR# [id number]   Acct# [id number]  10/01/24   12:32 CDT  Referring Provider: Wilber Carbajal,*    Chief Complaint: COVID, severe COPD, chronic respiratory failure oxygen dependent    Interval history: Patient was seen in the follow-up visit in pulmonary rounds in medical floor today.  He is currently requiring 6 L of oxygen.  He is still complaining of some nasal dryness and using saline gel.  Seen by ENT no further epistaxis noted.  He is getting humidified oxygen.  Patient will be getting discharged home most likely today or tomorrow.  According to case management his home oxygen concentrator to liters has been arranged and he can use 6 L at rest with titration and keep 8 to 10 L on exercise and activity.    Meds:  acetaminophen, 1,000 mg, Oral, Nightly  aspirin, 81 mg, Oral, Daily  atorvastatin, 20 mg, Oral, Nightly  budesonide-formoterol, 2 puff, Inhalation, BID - RT  cetirizine, 10 mg, Oral, Daily  empagliflozin, 10 mg, Oral, Daily  fluticasone, 2 spray, Each Nare, Daily  guaiFENesin, 600 mg, Oral, Q12H  ipratropium-albuterol, 3 mL, Nebulization, 4x Daily - RT  isosorbide mononitrate, 30 mg, Oral, Daily Before Lunch  melatonin, 10 mg, Oral, Nightly  metoprolol succinate XL, 25 mg, Oral, Daily  oxymetazoline, 2 spray, Each Nare, TID  pantoprazole, 40 mg, Oral, Daily  sodium bicarbonate, 650 mg, Oral, TID  sodium chloride, 10 mL, Intravenous, Q12H  sodium chloride, 2 spray, Each Nare, 5x Daily  tamsulosin, 0.4 mg, Oral, Nightly      sodium chloride, 2 spray        Physical Exam:  SpO2 Percentage    10/01/24 1013 10/01/24 1021 10/01/24 1205   SpO2: 94% 100% 91%     Body mass index is 26.54 kg/m².   Temp:  [97.3 °F (36.3 °C)-98.1 °F (36.7 °C)] 97.3 °F (36.3 °C)  Heart Rate:  [] 58  Resp:  [20-24] 20  BP: (121-142)/(56-74) 129/56  Intake/Output Summary (Last 24 hours) at 10/1/2024 1232  Last data  filed at 10/1/2024 1205  Gross per 24 hour   Intake 240 ml   Output 1175 ml   Net -935 ml     Physical Exam  Vitals reviewed.   Constitutional:       Appearance: He is well-developed.      Interventions: Nasal cannula in place.   Cardiovascular:      Rate and Rhythm: Normal rate.   Pulmonary:      Effort: Pulmonary effort is normal.      Breath sounds: Decreased breath sounds present.   Musculoskeletal:         General: Normal range of motion.      Cervical back: Normal range of motion and neck supple.   Neurological:      Mental Status: He is alert and oriented to person, place, and time.   Psychiatric:         Behavior: Behavior normal.         Thought Content: Thought content normal.         Judgment: Judgment normal.       Result Review  Laboratory Data:  Results from last 7 days   Lab Units 10/01/24  0435 09/29/24  0400 09/26/24  0449   WBC 10*3/mm3 7.31 7.00 8.52   HEMOGLOBIN g/dL 11.1* 10.9* 11.3*   PLATELETS 10*3/mm3 182 159 150     Results from last 7 days   Lab Units 10/01/24  0339 09/29/24  0400 09/28/24  0221 09/27/24  0957   SODIUM mmol/L 136 140 141 137   POTASSIUM mmol/L 4.8 4.2 4.3 4.0   CO2 mmol/L 18.0* 25.0 26.0 24.0   BUN mg/dL 37* 42* 45* 47*   CREATININE mg/dL 1.59* 2.09* 2.01* 2.18*   CRP mg/dL  --   --   --  10.32*         Microbiology Results (last 10 days)       Procedure Component Value - Date/Time    MRSA Screen, PCR (Inpatient) - Swab, Nares [[id number]]  (Normal) Collected: 09/21/24 1300    Lab Status: Final result Specimen: Swab from Nares Updated: 09/21/24 1431     MRSA PCR No MRSA Detected    Narrative:      The negative predictive value of this diagnostic test is high and should only be used to consider de-escalating anti-MRSA therapy. A positive result may indicate colonization with MRSA and must be correlated clinically.           Recent films:  XR Chest 1 View    Result Date: 9/30/2024  EXAMINATION: XR CHEST 1 VW- 9/30/2024 1:46 PM  HISTORY: follow-up hypoxemia.  REPORT: A frontal  view of the chest was obtained.  COMPARISON: Chest x-ray 9/26/2024. CT chest without contrast 9/21/2024.  There are coarse reticular interstitial opacities throughout both lungs similar to the previous study and likely related to advanced pulmonary fibrosis. There is a superimposed area of stable masslike parenchymal consolidation in the right upper lobe which is unchanged. There is blunting of the right costophrenic angle which may be due to a small pleural effusion or chronic pleural thickening. The right internal jugular port catheter appears in good position as before. Heart size is normal.      Impression: Stable chest x-ray compared with the study from 4 days earlier.  This report was signed and finalized on 9/30/2024 1:49 PM by Dr. Allen Churchill MD.      Personal review of imaging : CXR shows last chest x-ray shows stable findings with chronic changes no significant change noted from the prior x-ray done 4 days ago.      Pulmonary Assessment:  Acute on chronic hypoxic respiratory failure  Dependence on supplemental oxygen  Bilateral pulm infiltrate/post-COVID syndrome  Secondary bacterial pneumonia  History of interstitial lung disease  Chronic congestive diastolic heart failure with preserved EF.  Chronic obstructive pulmonary disease.  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  He was resting comfortably on set of oxygen.  No further epistaxis.  Using saline for nasal dryness  He will be getting the 10 L concentrator delivered at home by the DME company and case management confirmed today  COVID treatment completed he will continue his home inhalers and nebulizers  Continue bronchodilator treatment routine respiratory care when in the hospital..  Encourage incentive spirometry  He will be requiring 6 L at rest with  oxygen titration and 8 to 10 L on activity for now.  DVT and stress ulcer prophylaxis pain and anxiety control.  Resume prophylactic anticoagulation if needed.  Continue current care plan  Nutritional support.  Plan for outpatient pulmonary follow-up with me after discharge in a month..  Labs and imaging studies from time to time  CODE STATUS: Full.  Overall prognosis: Guarded.  We will follow.  I am anticipating discharge today or tomorrow.    Time spent by me: 35 min    Electronically signed by     Tarik Crocker MD,   Pulmonologist/Intensivist   10/1/2024, 12:32 CDT

## 2024-10-01 NOTE — PLAN OF CARE
Problem: Adult Inpatient Plan of Care  Goal: Plan of Care Review  Outcome: Progressing  Flowsheets (Taken 10/1/2024 0422)  Outcome Evaluation: A&OX4. Patient c/o increased dyspnea. Increased oxygen from 6L to 8L . O2 90-94 @8L HFNC. Crackles noted in LLL, JASON, and RLL. Notified by monitor room around 0200 that patient had a rhythm change at 2034. EKG perfomed and showed complete HB. Provider notified. No new orders at this time. Bed alarm in place. Safety maintained. Will continue to monitor. /complete HB 54-85.  Goal: Patient-Specific Goal (Individualized)  Outcome: Progressing  Goal: Absence of Hospital-Acquired Illness or Injury  Outcome: Progressing  Intervention: Identify and Manage Fall Risk  Recent Flowsheet Documentation  Taken 10/1/2024 0400 by Gladis Davis RN  Safety Promotion/Fall Prevention: safety round/check completed  Taken 10/1/2024 0000 by Gladis Davis RN  Safety Promotion/Fall Prevention: safety round/check completed  Taken 9/30/2024 2200 by Gladis Davis RN  Safety Promotion/Fall Prevention: safety round/check completed  Taken 9/30/2024 2100 by Gladis Davis RN  Safety Promotion/Fall Prevention: safety round/check completed  Taken 9/30/2024 2000 by Gladis Davis RN  Safety Promotion/Fall Prevention: safety round/check completed  Intervention: Prevent Skin Injury  Recent Flowsheet Documentation  Taken 9/30/2024 2000 by Gladis Davis RN  Body Position: position changed independently  Intervention: Prevent and Manage VTE (Venous Thromboembolism) Risk  Recent Flowsheet Documentation  Taken 9/30/2024 2000 by Gladis Davis RN  Activity Management: activity encouraged  VTE Prevention/Management:   sequential compression devices off   patient refused intervention  Range of Motion: active ROM (range of motion) encouraged  Intervention: Prevent Infection  Recent Flowsheet Documentation  Taken 9/30/2024 2000 by Gladis Davis RN  Infection Prevention:   single patient room provided   rest/sleep  promoted  Goal: Optimal Comfort and Wellbeing  Outcome: Progressing  Intervention: Provide Person-Centered Care  Recent Flowsheet Documentation  Taken 9/30/2024 2000 by Gladis Davis RN  Trust Relationship/Rapport:   care explained   questions answered   questions encouraged  Goal: Readiness for Transition of Care  Outcome: Progressing   Goal Outcome Evaluation:              Outcome Evaluation: A&OX4. Patient c/o increased dyspnea. Increased oxygen from 6L to 8L . O2 90-94 @8L HFNC. Crackles noted in LLL, JASON, and RLL. Notified by monitor room around 0200 that patient had a rhythm change at 2034. EKG perfomed and showed complete HB. Provider notified. No new orders at this time. Bed alarm in place. Safety maintained. Will continue to monitor. /complete HB 54-85.

## 2024-10-01 NOTE — DISCHARGE PLACEMENT REQUEST
"Karoline Miranda \"Fahad\" (84 y.o. Male)       Date of Birth   1940    Social Security Number       Address   [address] [address]    Home Phone   [phone]    MRN   [id number]       Baptism   [facility]    Marital Status   Single                            Admission Date   9/19/24    Admission Type   Emergency    Admitting Provider   Wilber Carbajal MD    Attending Provider   Wilber Carbajal MD    Department, Room/Bed   [facility] 4B, 451/1       Discharge Date       Discharge Disposition       Discharge Destination                                 Attending Provider: Wilber Carbajal MD    Allergies: Penicillins    Isolation: None   Infection: COVID (History) (09/27/24)   Code Status: CPR    Ht: 162.6 cm (64\")   Wt: 70.1 kg (154 lb 9.6 oz)    Admission Cmt: None   Principal Problem: Acute on chronic respiratory failure with hypoxia [J96.21]                   Active Insurance as of 9/19/2024       Primary Coverage       Payor Plan Insurance Group Employer/Plan Group    MEDICARE MEDICARE A & B        Payor Plan Address Payor Plan Phone Number Payor Plan Fax Number Effective Dates    [address] [phone]  6/1/2007 - None Entered    [facility] [id number]         Subscriber Name Subscriber Birth Date Member ID       KAROLINE MIRANDA 1940 [id number]               Secondary Coverage       Payor Plan Insurance Group Employer/Plan Group    COMBINED INSURANCE CO COMBINED INSURANCE CO NGN       Payor Plan Address Payor Plan Phone Number Payor Plan Fax Number Effective Dates    [address] [phone]  12/1/2013 - None Entered    [facility] [id number]         Subscriber Name Subscriber Birth Date Member ID       KAROLINE MIRANDA 1940 [id number]                     Emergency Contacts        (Rel.) Home Phone Work Phone Mobile Phone    Keshav Miranda (Brother) -- [phone]    MoiBeau (Son) [phone] -- " [phone]    Ulysses Fatima (Friend) -- -- [phone]          Oxygen Therapy [EQ60] (Order [id number])  Order  Date: 10/1/2024 Department: [address] Ordering/Authorizing: Tarik Crocker MD     Order History  Outpatient  Date/Time Action Taken User Additional Information   10/01/24 1056 Sign Kristen Dallas RN Ordering Mode: Telephone with readback     Order Details    Frequency Duration Priority Order Class   None None Routine External     Start Date/Time    Start Date   10/01/24     Order Information    Order Date Service Start Date Start Time   10/01/24 Medicine 10/01/24      Reference Links    Associated Reports   View Encounter     Order Questions    Question Answer   Delivery Modality Nasal Cannula   Oxygen Flow Rate (LPM) 10   Duration: Continuous   Equipment  Oxygen Concentrator &  &  Portable Gaseous Oxygen System & Portable Oxygen Contents Gaseous &  Conserving Regulator   Length of Need 99 Months = Lifetime            Verbal / Cosign Order Info    Action Created on Order Mode Entered by Responsible Provider Signed by Signed on   Ordering 10/01/24 1056 Telephone with readback Kristen Dallas RN Sensarma, Sugata, MD       Source Order Set / Preference List    Preference List   AMB SUPPLIES [[id number]]             Clinical Indications     ICD-10-CM ICD-9-CM   Hypoxia  - Primary    R09.02 799.02   Chronic obstructive pulmonary disease, unspecified COPD type    J44.9 496                             Reprint Order Requisition    Oxygen Therapy (Order #[id number]) on 10/1/24         Encounter    View Encounter                Order Provider Info        Office phone Pager E-mail   Ordering User  Kristen Dallas RN  -- -- --   Authorizing Provider  Tarik Crocker MD  [phone] -- --   Attending Provider  Wilber Carbajal MD  [phone] -- --   Entered By  Kristen Dallas RN  -- -- --   Ordering Provider  Tarik Crocker MD  [phone] -- --      Tracking Reports    Cosign Tracking Order Transmittal Tracking     Authorized by:  Tarik Crocker MD  (NPI: [id number])                Lab Component SmartPhrase Guide    Oxygen Therapy (Order #[id number]) on 10/1/24

## 2024-10-02 LAB
QT INTERVAL: 466 MS
QTC INTERVAL: 441 MS

## 2024-10-02 PROCEDURE — 94664 DEMO&/EVAL PT USE INHALER: CPT

## 2024-10-02 PROCEDURE — 97530 THERAPEUTIC ACTIVITIES: CPT

## 2024-10-02 PROCEDURE — 94799 UNLISTED PULMONARY SVC/PX: CPT

## 2024-10-02 PROCEDURE — 99233 SBSQ HOSP IP/OBS HIGH 50: CPT | Performed by: INTERNAL MEDICINE

## 2024-10-02 PROCEDURE — 97116 GAIT TRAINING THERAPY: CPT

## 2024-10-02 PROCEDURE — 94618 PULMONARY STRESS TESTING: CPT

## 2024-10-02 PROCEDURE — 94761 N-INVAS EAR/PLS OXIMETRY MLT: CPT

## 2024-10-02 RX ADMIN — Medication 10 MG: at 20:39

## 2024-10-02 RX ADMIN — SODIUM BICARBONATE 650 MG: 650 TABLET ORAL at 17:09

## 2024-10-02 RX ADMIN — BUDESONIDE AND FORMOTEROL FUMARATE DIHYDRATE 2 PUFF: 160; 4.5 AEROSOL RESPIRATORY (INHALATION) at 06:18

## 2024-10-02 RX ADMIN — SODIUM BICARBONATE 650 MG: 650 TABLET ORAL at 20:39

## 2024-10-02 RX ADMIN — IPRATROPIUM BROMIDE AND ALBUTEROL SULFATE 3 ML: .5; 3 SOLUTION RESPIRATORY (INHALATION) at 06:10

## 2024-10-02 RX ADMIN — DOCUSATE SODIUM 50 MG AND SENNOSIDES 8.6 MG 2 TABLET: 8.6; 5 TABLET, FILM COATED ORAL at 20:38

## 2024-10-02 RX ADMIN — FLUTICASONE PROPIONATE 2 SPRAY: 50 SPRAY, METERED NASAL at 09:04

## 2024-10-02 RX ADMIN — ATORVASTATIN CALCIUM 20 MG: 10 TABLET, FILM COATED ORAL at 20:38

## 2024-10-02 RX ADMIN — SALINE NASAL SPRAY 2 SPRAY: 1.5 SOLUTION NASAL at 09:04

## 2024-10-02 RX ADMIN — Medication 10 ML: at 09:02

## 2024-10-02 RX ADMIN — GUAIFENESIN 600 MG: 600 TABLET, EXTENDED RELEASE ORAL at 09:01

## 2024-10-02 RX ADMIN — Medication 2 SPRAY: at 17:09

## 2024-10-02 RX ADMIN — CETIRIZINE HYDROCHLORIDE 10 MG: 10 TABLET ORAL at 09:01

## 2024-10-02 RX ADMIN — IPRATROPIUM BROMIDE AND ALBUTEROL SULFATE 3 ML: .5; 3 SOLUTION RESPIRATORY (INHALATION) at 14:17

## 2024-10-02 RX ADMIN — ISOSORBIDE MONONITRATE 30 MG: 30 TABLET, EXTENDED RELEASE ORAL at 11:48

## 2024-10-02 RX ADMIN — METOPROLOL SUCCINATE 25 MG: 25 TABLET, EXTENDED RELEASE ORAL at 09:01

## 2024-10-02 RX ADMIN — EMPAGLIFLOZIN 10 MG: 10 TABLET, FILM COATED ORAL at 09:01

## 2024-10-02 RX ADMIN — SALINE NASAL SPRAY 2 SPRAY: 1.5 SOLUTION NASAL at 11:49

## 2024-10-02 RX ADMIN — BUDESONIDE AND FORMOTEROL FUMARATE DIHYDRATE 2 PUFF: 160; 4.5 AEROSOL RESPIRATORY (INHALATION) at 19:04

## 2024-10-02 RX ADMIN — IPRATROPIUM BROMIDE AND ALBUTEROL SULFATE 3 ML: .5; 3 SOLUTION RESPIRATORY (INHALATION) at 10:35

## 2024-10-02 RX ADMIN — SALINE NASAL SPRAY 2 SPRAY: 1.5 SOLUTION NASAL at 20:39

## 2024-10-02 RX ADMIN — SALINE NASAL SPRAY 2 SPRAY: 1.5 SOLUTION NASAL at 17:09

## 2024-10-02 RX ADMIN — TAMSULOSIN HYDROCHLORIDE 0.4 MG: 0.4 CAPSULE ORAL at 20:38

## 2024-10-02 RX ADMIN — ACETAMINOPHEN 1000 MG: 500 TABLET, FILM COATED ORAL at 20:39

## 2024-10-02 RX ADMIN — ASPIRIN 81 MG: 81 TABLET, COATED ORAL at 09:01

## 2024-10-02 RX ADMIN — SODIUM BICARBONATE 650 MG: 650 TABLET ORAL at 09:01

## 2024-10-02 RX ADMIN — Medication 10 ML: at 20:39

## 2024-10-02 RX ADMIN — IPRATROPIUM BROMIDE AND ALBUTEROL SULFATE 3 ML: .5; 3 SOLUTION RESPIRATORY (INHALATION) at 19:04

## 2024-10-02 RX ADMIN — GUAIFENESIN 600 MG: 600 TABLET, EXTENDED RELEASE ORAL at 20:39

## 2024-10-02 RX ADMIN — Medication 2 SPRAY: at 20:39

## 2024-10-02 RX ADMIN — Medication 2 SPRAY: at 09:04

## 2024-10-02 RX ADMIN — PANTOPRAZOLE SODIUM 40 MG: 40 TABLET, DELAYED RELEASE ORAL at 09:01

## 2024-10-02 NOTE — PROGRESS NOTES
[facility] Medicine Services  INPATIENT PROGRESS NOTE    Patient Name: Karoline Prater  Date of Admission: 9/19/2024  Today's Date: 10/02/24  Length of Stay: 12  Primary Care Physician: Irving Alexis MD    Subjective   Chief Complaint: Shortness of breath  HPI   10/1 Patient dyspneic at rest continues to require 7 to 10 L nasal cannula.  He has had epistaxis that is now resolved.    Today:  Patient has ambulated with pulse oximetry and had significant hypoxemia requiring 15 L oxygen nasal cannula to recover.  Although he does appear at baseline.    Review of Systems   All pertinent negatives and positives are as above. All other systems have been reviewed and are negative unless otherwise stated.     Objective    Temp:  [97.6 °F (36.4 °C)-98.1 °F (36.7 °C)] 97.9 °F (36.6 °C)  Heart Rate:  [56-84] 56  Resp:  [16-22] 18  BP: (113-143)/(63-81) 113/63  Physical Exam  Constitutional:       General: He is not in acute distress.     Appearance: He is not ill-appearing.   HENT:      Head: Normocephalic.      Nose:      Comments: No active epistaxis, clot on both nosetrils      Mouth/Throat:      Mouth: Mucous membranes are moist.   Eyes:      Pupils: Pupils are equal, round, and reactive to light.   Cardiovascular:      Rate and Rhythm: Normal rate.   Pulmonary:      Effort: Pulmonary effort is normal.      Comments: Scattered coarse, crackly BSs on 7LNC during my assessment with 02 sats 92-93% - he had just returned to bed shortly before my arrival  Musculoskeletal:         General: No deformity.   Skin:     General: Skin is warm.   Neurological:      Mental Status: He is alert.      Motor: Weakness present.   Psychiatric:         Mood and Affect: Mood normal.     Results Review:  I have reviewed the labs, radiology results, and diagnostic studies.    Laboratory Data:   Results from last 7 days   Lab Units 10/01/24  0435 09/29/24  0400 09/26/24  0449   WBC 10*3/mm3 7.31  Subjective   History of Present Illness     History of Present Illness    Chief complaint: Generalized weakness, URI symptoms    Location: No focal pain complaint    Quality/Severity:  Moderate    Timing/Duration: Cough for 1 week, mashed appetite ×3 days    Modifying Factors: None identified    Associated Symptoms: Minimally productive cough with yellow sputum, chills but no reported fever, diminished appetite without nausea, vomiting or diarrhea.  Sinus drainage but no sore throat.  No dysuria or hematuria.  No change in normal bowel habits.    Narrative: 75-year-old female with generalized weakness and URI symptoms along with diminished appetite is brought to the emergency department for evaluation by her children because they're concerned she may be dehydrated and malnourished.    PCP: [place]    Review of Systems  All other systems reviewed and are otherwise negative as related to chief complaint.    Past Medical History:   Diagnosis Date   • Asthma    • Hyperlipidemia    • Seasonal allergies        Allergies   Allergen Reactions   • Erythromycin    • Penicillins        Past Surgical History:   Procedure Laterality Date   • BREAST LUMPECTOMY Left        History reviewed. No pertinent family history.    Social History     Social History   • Marital status:      Spouse name: N/A   • Number of children: N/A   • Years of education: N/A     Social History Main Topics   • Smoking status: Never Smoker   • Smokeless tobacco: None   • Alcohol use No   • Drug use: No   • Sexual activity: Not Asked     Other Topics Concern   • None     Social History Narrative   • None           Objective   Physical Exam   Constitutional: She is oriented to person, place, and time. She appears well-developed. No distress.   Nasally speech but otherwise not overtly ill or toxic appearing   HENT:   Head: Normocephalic.   Mouth/Throat: Oropharynx is clear and moist.   Eyes: Conjunctivae are normal. No scleral icterus.   Neck: Neck  "7.00 8.52   HEMOGLOBIN g/dL 11.1* 10.9* 11.3*   HEMATOCRIT % 37.3* 37.1* 37.0*   PLATELETS 10*3/mm3 182 159 150        Results from last 7 days   Lab Units 10/01/24  0339 09/29/24  0400 09/28/24  0221   SODIUM mmol/L 136 140 141   POTASSIUM mmol/L 4.8 4.2 4.3   CHLORIDE mmol/L 106 103 106   CO2 mmol/L 18.0* 25.0 26.0   BUN mg/dL 37* 42* 45*   CREATININE mg/dL 1.59* 2.09* 2.01*   CALCIUM mg/dL 10.0 10.2 9.9   GLUCOSE mg/dL 130* 103* 119*       Culture Data:   No results found for: \"BLOODCX\", \"URINECX\", \"WOUNDCX\", \"MRSACX\", \"RESPCX\", \"STOOLCX\"    Radiology Data:   Imaging Results (Last 24 Hours)       ** No results found for the last 24 hours. **            I have reviewed the patient's current medications.     Assessment/Plan   Assessment  Active Hospital Problems    Diagnosis     **Acute on chronic respiratory failure with hypoxia     Epistaxis     Chronic rhinitis     Acute-on-chronic respiratory failure     Pneumonitis     Bacterial pneumonia     COVID-19 virus infection     Chronic heart failure with preserved ejection fraction (HFpEF)     COPD (chronic obstructive pulmonary disease)     A-fib     Pulmonary fibrosis     Stage 3b chronic kidney disease     Malignant neoplasm of upper lobe of right lung     Essential hypertension        Treatment Plan  Vitals every 4 hours  Cardiac diet  IV fluids saline lock  Up ad shae.    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   Painless movement   Cardiovascular: Normal rate and regular rhythm.    Pulmonary/Chest: Effort normal. No respiratory distress. She has no wheezes.   Very minimal crackles at the bases bilaterally   Abdominal: Soft. She exhibits no distension. There is no tenderness. There is no rebound and no guarding.   Musculoskeletal: She exhibits no edema or tenderness.   MAEE, normal strength   Neurological: She is alert and oriented to person, place, and time.   Skin: Skin is warm and dry.   Psychiatric: She has a normal mood and affect. Thought content normal.   Nursing note and vitals reviewed.      Procedures         ED Course  ED Course   Comment By Time   CURB-65 score = 1 for age alone Cody Hui MD 01/01 1329   Reviewed lab results and chest x-ray findings with patient and family.  Patient has received some IV fluids until somewhat improved.  Spoke about diagnosis and treatment plan.  Reviewed red flags which indicate need for ED returned.  All express understanding agreement with plan for disposition at this time. Cody Hui MD 01/01 1342      EKG           EKG time: 1255  Rhythm/Rate: Sinus, 65  P waves and MI: BRIAN within normal limits  QRS, axis: Narrow QRS complex   ST and T waves: No STEMI; nonspecific ST/T-wave abnormalities; QTc within normal limits     Interpreted contemporaneously with treatment by me, independently viewed      RADIOLOGY        Study: Chest x-ray-2 views    Findings: Right middle lobe infiltrate, left lower lobe infiltrate    Interpreted contemporaneously with treatment by me, independently viewed by me      Results for orders placed or performed during the hospital encounter of 01/01/18   Comprehensive Metabolic Panel   Result Value Ref Range    Glucose 105 (H) 65 - 99 mg/dL    BUN 15 8 - 23 mg/dL    Creatinine 0.85 0.57 - 1.00 mg/dL    Sodium 132 (L) 136 - 145 mmol/L    Potassium 3.9 3.5 - 5.2 mmol/L    Chloride 95 (L) 98 - 107 mmol/L    CO2 22.0 22.0 - 29.0 mmol/L    Calcium  9.1 8.8 - 10.5 mg/dL    Total Protein 6.8 6.0 - 8.5 g/dL    Albumin 3.50 3.50 - 5.20 g/dL    ALT (SGPT) 55 (H) 5 - 33 U/L    AST (SGOT) 22 5 - 32 U/L    Alkaline Phosphatase 132 (H) 40 - 129 U/L    Total Bilirubin 0.9 0.2 - 1.2 mg/dL    eGFR Non African Amer 66 >60 mL/min/1.73    Globulin 3.3 gm/dL    A/G Ratio 1.1 g/dL    BUN/Creatinine Ratio 17.6 7.0 - 25.0    Anion Gap 15.0 mmol/L   Lipase   Result Value Ref Range    Lipase 23 13 - 60 U/L   Troponin   Result Value Ref Range    Troponin T <0.010 0.000 - 0.030 ng/mL   CBC Auto Differential   Result Value Ref Range    WBC 6.85 4.80 - 10.80 10*3/mm3    RBC 3.79 (L) 4.20 - 5.40 10*6/mm3    Hemoglobin 11.3 (L) 12.0 - 16.0 g/dL    Hematocrit 33.6 (L) 37.0 - 47.0 %    MCV 88.7 81.0 - 99.0 fL    MCH 29.8 27.0 - 31.0 pg    MCHC 33.6 31.0 - 37.0 g/dL    RDW 13.7 11.5 - 14.5 %    RDW-SD 44.2 37.0 - 54.0 fl    MPV 9.9 7.4 - 10.4 fL    Platelets 284 140 - 500 10*3/mm3    Neutrophil % 78.4 (H) 45.0 - 70.0 %    Lymphocyte % 11.2 (L) 20.0 - 45.0 %    Monocyte % 9.9 (H) 3.0 - 8.0 %    Eosinophil % 0.1 0.0 - 4.0 %    Basophil % 0.1 0.0 - 2.0 %    Immature Grans % 0.3 0.0 - 0.5 %    Neutrophils, Absolute 5.36 1.50 - 8.30 10*3/mm3    Lymphocytes, Absolute 0.77 0.60 - 4.80 10*3/mm3    Monocytes, Absolute 0.68 0.00 - 1.00 10*3/mm3    Eosinophils, Absolute 0.01 (L) 0.10 - 0.30 10*3/mm3    Basophils, Absolute 0.01 0.00 - 0.20 10*3/mm3    Immature Grans, Absolute 0.02 0.00 - 0.03 10*3/mm3    nRBC 0.0 0.0 - 0.0 /100 WBC                 MDM  Kyle query complete. Treatment plan to include limited course of prescribed controlled substance.  Risks including addiction, tolerance, sedation, benefits and alternatives presented to patient.    Final diagnoses:   Pneumonia of both lower lobes due to infectious organism   Generalized weakness              Medication List      New Prescriptions          albuterol 108 (90 Base) MCG/ACT inhaler   Commonly known as:  PROVENTIL HFA;VENTOLIN HFA   Inhale  failure with preserved ejection fraction  Diuresis as needed currently on hold  Patient had baseline weight  Daily weights    CKD stage III  BMP daily  Ins and outs    Deconditioning  PT and OT evaluations    Disposition  Patient would like to return home with home health care  He is At risk of readmissions as he has had frequent hospitalizations this year, recommended palliative care but patient does not seem amenable to consider other     Medical Decision Making  Number and Complexity of problems: 4 complex problems        Conditions and Status        Condition is unchanged.     MDM Data  External documents reviewed: none  Cardiac tracing (EKG, telemetry) interpretation: no new EKGs this AM  Radiology interpretation: no new radiology studies; chest x-ray 9/30/2024   Labs reviewed: as above  Any tests that were considered but not ordered: none     Decision rules/scores evaluated (example [id number], [place], etc): none     Discussed with: patient     Care Planning  Shared decision making: Discussed with patient with agreement to proceed with treatment plan as outlined  Code status and discussions: Full code     Disposition  Social Determinants of Health that impact treatment or disposition: None apparent at this time; patient does live at home alone  I expect the patient to be discharged to home with home health (possibly) when 02 requirement has improved (still requiring 8-10LNC with even minimal activity)    Electronically signed by Wilber Carbajal MD, 10/02/24, 18:44 CDT.   2 puffs every 4 hours when necessary wheeze, dyspnea, cough       HYDROcodone-homatropine 5-1.5 MG/5ML syrup   Commonly known as:  HYCODAN   Take 510 mL by mouth every 6 hours when necessary cough       levoFLOXacin 500 MG tablet   Commonly known as:  LEVAQUIN   Take 1 tablet by mouth Daily.           Follow-up Information     Follow up with Your primary care provider. Schedule an appointment as soon as possible for a visit in 1 week.    Why:  As needed, If symptoms fail to improve        Go to Emergency department.    Why:  As needed, If symptoms worsen               Cody Hui MD  01/01/18 [id number]

## 2024-10-02 NOTE — THERAPY TREATMENT NOTE
Acute Care - Physical Therapy Treatment Note  [facility]     Patient Name: Karoline Prater  : 1940  MRN: [id number]  Today's Date: 10/2/2024      Visit Dx:     ICD-10-CM ICD-9-CM   1. Hypoxia  R09.02 799.02   2. Pneumonitis  J98.4 486   3. Acute UTI  N39.0 599.0   4. Impaired mobility [Z74.09]  Z74.09 799.89   5. Chronic obstructive pulmonary disease, unspecified COPD type  J44.9 496     Patient Active Problem List   Diagnosis    Dyspnea    Essential hypertension    NSVT (nonsustained ventricular tachycardia)    Malignant neoplasm of upper lobe of right lung    Stage 3b chronic kidney disease    Pancytopenia due to antineoplastic chemotherapy    Pneumonia due to infectious organism    Function kidney decreased    Cellulitis    Acute on chronic respiratory failure with hypoxia    Pulmonary fibrosis    Macrocytic anemia    Thrombocytopenia    Sepsis    Right pneumothorax    Hyperkalemia    Acute kidney injury superimposed on CKD stage 3b    GERD without esophagitis    A-fib    Former smoker    Chest pain    Tachycardia    Bilateral lower extremity edema    Metastatic adenocarcinoma of the RUL    Chronic diastolic congestive heart failure    COPD (chronic obstructive pulmonary disease)    S/P radiation > 12 weeks    CHF (congestive heart failure)    History of radiation therapy    Chronic heart failure with preserved ejection fraction (HFpEF)    Narrow complex tachycardia    Atrial flutter    Encounter for examination for normal comparison and control in clinical research program    CHF exacerbation    COVID-19 virus infection    Cytokine release syndrome, grade 2    Bacterial pneumonia    Hyperlipidemia    Coronary artery disease    Pneumonitis    Acute-on-chronic respiratory failure    Epistaxis    Chronic rhinitis     Past Medical History:   Diagnosis Date    Arthritis     Atrial fibrillation with rapid ventricular response 2019    Blindness of left eye     BPH with obstruction/lower urinary tract  symptoms     Cancer     stomach & lung    CHF (congestive heart failure)     CKD (chronic kidney disease) stage 3, GFR 30-59 ml/min     COPD with acute exacerbation 08/03/2024    Coronary artery disease     Elevated cholesterol     Essential hypertension 10/02/2017    Fibrotic lung diseases     GERD (gastroesophageal reflux disease)     Hearing loss     History of transfusion     Hydronephrosis of left kidney     Hyperlipidemia     Hypertension     pt was taken off of all of his medications for BP (atenolol, lisinopril, lasix) because his BP kept bottoming out so his primary dr told him to discontinue them 1-2 months ago (Jan/Feb 2019). pt states he takes no medications currently.    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 Patel is just going to keep an eye on it and not do surgery right now.    Mediastinal adenopathy 10/24/2018    Station [facility]    Monoclonal gammopathy of unknown significance (MGUS) 09/11/2018    Pancreatic mass     pt states he had this in 2013 but it went away on its own. Now recent CT shows it has come back so he is going to have an ultrasound on 3/13/19.    Shortness of breath      Past Surgical History:   Procedure Laterality Date    ABDOMINAL SURGERY      BRONCHOSCOPY N/A 10/24/2018    Procedure: BRONCHOSCOPY WITH BIOPSY, EBUS;  Surgeon: Gareth Becerra MD;  Location: [facility] OR;  Service: Pulmonary    CHOLECYSTECTOMY      COLONOSCOPY N/A 1/3/2019    Procedure: COLONOSCOPY WITH ANESTHESIA;  Surgeon: Randy Somers DO;  Location: [facility] ENDOSCOPY;  Service: Gastroenterology    CYSTOSCOPY, RETROGRADE PYELOGRAM AND STENT INSERTION Left 3/8/2019    Procedure: CYSTOSCOPY RETROGRADE BILATERAL PYELOGRAM;  Surgeon: Jos Sylvester MD;  Location: [facility] OR;  Service: Urology    ENDOSCOPY N/A 12/11/2018    Procedure:  ESOPHAGOGASTRODUODENOSCOPY WITH ANESTHESIA;  Surgeon: Randy Somers DO;  Location: [facility] ENDOSCOPY;  Service: Gastroenterology    ENDOSCOPY N/A 4/29/2019    Procedure: ESOPHAGOGASTRODUODENOSCOPY WITH ANESTHESIA;  Surgeon: Lillaim Jj MD;  Location: [facility] OR;  Service: Gastroenterology    ENDOSCOPY N/A 5/9/2019    Procedure: ESOPHAGOGASTRODUODENOSCOPY WITH ANESTHESIA;  Surgeon: Pilo Bansal MD;  Location: [facility] ENDOSCOPY;  Service: Gastroenterology    EYE SURGERY Left 1964    FOOT SURGERY Right 1966    joint    FRACTURE SURGERY      PACEMAKER IMPLANTATION Left 8/29/2024    Procedure: Leadless pacemaker implant and AVN ablation;  Surgeon: Carlitos Bowen MD;  Location: [facility] CATH INVASIVE LOCATION;  Service: Cardiovascular;  Laterality: Left;     PT Assessment (Last 12 Hours)       PT Evaluation and Treatment       Row Name 10/02/24 1459          Physical Therapy Time and Intention    Subjective Information complains of;fatigue;dyspnea  -     Document Type therapy note (daily note)  -     Mode of Treatment physical therapy  -     Comment RT present to gather O2 sats for walk ox.  -       Row Name 10/02/24 1459          General Information    Existing Precautions/Restrictions fall;oxygen therapy device and L/min  -       Row Name 10/02/24 1459          Pain    Pretreatment Pain Rating 0/10 - no pain  -     Posttreatment Pain Rating 0/10 - no pain  -       Row Name 10/02/24 1459          Bed Mobility    Supine-Sit [place] (Bed Mobility) standby assist  -     Sit-Supine [place] (Bed Mobility) verbal cues;minimum assist (75% patient effort)  -       Row Name 10/02/24 1459          Transfers    Comment, (Transfers) stood x 3, cues to use arms to push up.  -       Row Name 10/02/24 1459          Sit-Stand Transfer    Sit-Stand [place] (Transfers) verbal cues;contact guard;minimum assist (75% patient effort)  -       Row Name 10/02/24 1459          Stand-Sit Transfer     Stand-Sit [place] (Transfers) verbal cues;contact guard  -       Row Name 10/02/24 1459          Gait/Stairs (Locomotion)    [place] Level (Gait) verbal cues;minimum assist (75% patient effort)  -     Assistive Device (Gait) --  HHA  -MF     Distance in Feet (Gait) 3  3' to chair, prolonged sitting rest, then 10' in room.  -     Deviations/Abnormal Patterns (Gait) hailey decreased;stride length decreased  -     Bilateral Gait Deviations forward flexed posture;heel strike decreased  -     Comment, (Gait/Stairs) unsteady-required HHA for balance  -       Row Name 10/02/24 1459          Balance    Comment, Balance Sat eOB for several minutes before getting up to work on breathing technique and to get O2 sat up enough to stand.  -       Row Name 10/02/24 1459          Plan of Care Review    Plan of Care Reviewed With patient  -     Progress declining  -     Outcome Evaluation Pt. agreeable to therapy. Pt. c/o fatigue due to having coughing spell earlier. RT present to gather O2 sats for pulse ox. Pt. was SBA to get to EOB. With doing this, his O2 sat dropped to 84% while on 7L. Increased O2 slowly up to 10L to get o2 sat finally up to 91%. Frequent cues for pursed lip breathing. After 10 minutes, pt was able to stand and walk 3' to chair with MIN assist. O2 sat dropped again into the 80's while on 10L. Again, it took several minutes to recover. Increased to 15L to attempt ambulation. Pt. only able to walk 10' and needed MIN assist. O2 sat dropped to 81% while on 15L. Very labored breathing. Pt. unsteady with mobility. Almost 10 minutes to recover back to 93%. The activity of getting back into bed also caused his O2 sats to drop and brought on labored breathing. Pt. is unsafe to return home at this time. He is requiring MIN assist for standing and mobility. He is limited to walking only a couple of feet. This patient lives alone and would be unable to care for himself in this state. He would  greatly benefit from further therapy to increase his strength and endurance. His respiratory status is greatly limiting his mobility.  -MF       Row Name 10/02/24 1459          Vital Signs    Pre SpO2 (%) 90  -MF     O2 Delivery Pre Treatment hi-flow  7l  -MF     Intra SpO2 (%) 81   dropped to 84% just getting to EOB while on 7L. Increased to 8l-10l. O2 sat increased to 91%. Walked to 3' to chair while on 10L-dropped to 80%-about 5-10minutes to recover. Increased to 15L to walk 10'-dropped to 81%-took 5-10 min to recover to 93%.  -MF     O2 Delivery Intra Treatment hi-flow  15l  -MF     Post SpO2 (%) 93  -MF     O2 Delivery Post Treatment hi-flow  7l  -MF     Pre Patient Position Sitting  -MF     Intra Patient Position Standing  -MF     Post Patient Position Sitting  -MF       Row Name 10/02/24 1459          Positioning and Restraints    Pre-Treatment Position in bed  -MF     Post Treatment Position bed  -MF     In Bed notified nsg;fowlers;call light within reach;encouraged to call for assist;exit alarm on;side rails up x2  -MF               User Key  (r) = Recorded By, (t) = Taken By, (c) = Cosigned By      Initials Name Provider Type    Lucrecia Roblero, PTA Physical Therapist Assistant                    Physical Therapy Education       Title: PT OT SLP Therapies (In Progress)       Topic: Physical Therapy (In Progress)       Point: Mobility training (Done)       Learning Progress Summary             Patient Acceptance, E, VU,NR by LEIDY at 10/1/2024 1102    Comment: benefits of activity, progression of PT    Acceptance, E,TB, VU by  at 9/25/2024 0755    Acceptance, E,TB, VU by  at 9/24/2024 0745    Acceptance, E, VU,DU,NR by LEIDY at 9/20/2024 1400    Comment: benefits of activity, progression of PT POC                         Point: Home exercise program (Not Started)       Learner Progress:  Not documented in this visit.              Point: Body mechanics (Not Started)       Learner Progress:  Not  documented in this visit.              Point: Precautions (Not Started)       Learner Progress:  Not documented in this visit.                              User Key       Initials Effective Dates Name Provider Type Discipline    LEIDY 02/03/23 -  Cristian Hirsch PT DPT Physical Therapist PT     06/19/24 -  Rosa Aiken, RN Registered Nurse Nurse                  PT Recommendation and Plan     Plan of Care Reviewed With: patient  Progress: declining  Outcome Evaluation: Pt. agreeable to therapy. Pt. c/o fatigue due to having coughing spell earlier. RT present to gather O2 sats for pulse ox. Pt. was SBA to get to EOB. With doing this, his O2 sat dropped to 84% while on 7L. Increased O2 slowly up to 10L to get o2 sat finally up to 91%. Frequent cues for pursed lip breathing. After 10 minutes, pt was able to stand and walk 3' to chair with MIN assist. O2 sat dropped again into the 80's while on 10L. Again, it took several minutes to recover. Increased to 15L to attempt ambulation. Pt. only able to walk 10' and needed MIN assist. O2 sat dropped to 81% while on 15L. Very labored breathing. Pt. unsteady with mobility. Almost 10 minutes to recover back to 93%. The activity of getting back into bed also caused his O2 sats to drop and brought on labored breathing. Pt. is unsafe to return home at this time. He is requiring MIN assist for standing and mobility. He is limited to walking only a couple of feet. This patient lives alone and would be unable to care for himself in this state. He would greatly benefit from further therapy to increase his strength and endurance. His respiratory status is greatly limiting his mobility.   Outcome Measures       Row Name 10/02/24 4829             How much help from another person do you currently need...    Turning from your back to your side while in flat bed without using bedrails? 3  -MF      Moving from lying on back to sitting on the side of a flat bed without bedrails? 3   -MF      Moving to and from a bed to a chair (including a wheelchair)? 3  -MF      Standing up from a chair using your arms (e.g., wheelchair, bedside chair)? 3  -MF      Climbing 3-5 steps with a railing? 2  -MF      To walk in hospital room? 3  -MF      AM-PAC 6 Clicks Score (PT) 17  -MF      Highest Level of Mobility Goal 5 --> Static standing  -MF         Functional Assessment    Outcome Measure Options AM-PAC 6 Clicks Basic Mobility (PT)  -MF                User Key  (r) = Recorded By, (t) = Taken By, (c) = Cosigned By      Initials Name Provider Type    Lucrecia Roblero PTA Physical Therapist Assistant                     Time Calculation:    PT Charges       Row Name 10/02/24 1603             Time Calculation    Start Time 1459  -MF      Stop Time 1541  -MF      Time Calculation (min) 42 min  -MF      PT Received On 10/02/24  -MF         Time Calculation- PT    Total Timed Code Minutes- PT 42 minute(s)  -MF         Timed Charges    39209 - Gait Training Minutes  15  -MF      42223 - PT Therapeutic Activity Minutes 27  -MF         Total Minutes    Timed Charges Total Minutes 42  -MF       Total Minutes 42  -MF                User Key  (r) = Recorded By, (t) = Taken By, (c) = Cosigned By      Initials Name Provider Type    Lucrecia Roblero PTA Physical Therapist Assistant                  Therapy Charges for Today       Code Description Service Date Service Provider Modifiers Qty    77516649249 HC GAIT TRAINING EA 15 MIN 10/2/2024 Lucrecia Mello PTA GP 1    13744453338 HC PT THERAPEUTIC ACT EA 15 MIN 10/2/2024 Lucrecia Mello PTA GP 2            PT G-Codes  Outcome Measure Options: AM-PAC 6 Clicks Basic Mobility (PT)  AM-PAC 6 Clicks Score (PT): 17    Lucrecia Mello PTA  10/2/2024

## 2024-10-02 NOTE — PROGRESS NOTES
[facility] PULMONARY PROGRESS NOTE - [facility]    Patient: Karoline Prater  1940   MR# [id number]   Acct# [id number]  10/02/24   13:27 CDT  Referring Provider: Wilber Carbajal,*    Chief Complaint: COVID, severe COPD, chronic respiratory failure oxygen dependent    Interval history: Patient was seen in the follow-up visit in pulmonary rounds in medical floor today.  He is currently requiring 7 L of oxygen.  He is breathing better and after his insulin his nose is moist and he is also getting humidification with oxygen and no respiratory problems reported.  He is ready for discharge home..  According to case management his home oxygen concentrator to liters has been arranged and he can use 6 L -7 L at rest with titration and keep 8 to 10 L on exercise and activity.    Meds:  acetaminophen, 1,000 mg, Oral, Nightly  aspirin, 81 mg, Oral, Daily  atorvastatin, 20 mg, Oral, Nightly  budesonide-formoterol, 2 puff, Inhalation, BID - RT  cetirizine, 10 mg, Oral, Daily  empagliflozin, 10 mg, Oral, Daily  fluticasone, 2 spray, Each Nare, Daily  guaiFENesin, 600 mg, Oral, Q12H  ipratropium-albuterol, 3 mL, Nebulization, 4x Daily - RT  isosorbide mononitrate, 30 mg, Oral, Daily Before Lunch  melatonin, 10 mg, Oral, Nightly  metoprolol succinate XL, 25 mg, Oral, Daily  oxymetazoline, 2 spray, Each Nare, TID  pantoprazole, 40 mg, Oral, Daily  sodium bicarbonate, 650 mg, Oral, TID  sodium chloride, 10 mL, Intravenous, Q12H  sodium chloride, 2 spray, Each Nare, 5x Daily  tamsulosin, 0.4 mg, Oral, Nightly      sodium chloride, 2 spray        Physical Exam:  SpO2 Percentage    10/02/24 0745 10/02/24 1035 10/02/24 1125   SpO2: 96% 90% 96%     Body mass index is 26.18 kg/m².   Temp:  [97.6 °F (36.4 °C)-98.2 °F (36.8 °C)] 97.9 °F (36.6 °C)  Heart Rate:  [55-84] 78  Resp:  [16-22] 18  BP: (120-143)/(57-81) 120/70  Intake/Output Summary (Last 24 hours) at 10/2/2024 1327  Last data filed at 10/2/2024  1125  Gross per 24 hour   Intake 480 ml   Output 1425 ml   Net -945 ml     Physical Exam  Vitals reviewed.   Constitutional:       Appearance: He is well-developed.      Interventions: Nasal cannula in place.   Cardiovascular:      Rate and Rhythm: Normal rate.   Pulmonary:      Effort: Pulmonary effort is normal.      Breath sounds: Decreased breath sounds present.   Musculoskeletal:         General: Normal range of motion.      Cervical back: Normal range of motion and neck supple.   Neurological:      Mental Status: He is alert and oriented to person, place, and time.   Psychiatric:         Behavior: Behavior normal.         Thought Content: Thought content normal.         Judgment: Judgment normal.       Result Review  Laboratory Data:  Results from last 7 days   Lab Units 10/01/24  0435 09/29/24  0400 09/26/24  0449   WBC 10*3/mm3 7.31 7.00 8.52   HEMOGLOBIN g/dL 11.1* 10.9* 11.3*   PLATELETS 10*3/mm3 182 159 150     Results from last 7 days   Lab Units 10/01/24  0339 09/29/24  0400 09/28/24  0221 09/27/24  0957   SODIUM mmol/L 136 140 141 137   POTASSIUM mmol/L 4.8 4.2 4.3 4.0   CO2 mmol/L 18.0* 25.0 26.0 24.0   BUN mg/dL 37* 42* 45* 47*   CREATININE mg/dL 1.59* 2.09* 2.01* 2.18*   CRP mg/dL  --   --   --  10.32*         Microbiology Results (last 10 days)       ** No results found for the last 240 hours. **           Recent films:  No radiology results from the last 24 hrs   Personal review of imaging : CXR shows last chest x-ray shows stable findings with chronic changes no significant change noted from the prior x-ray done 4 days ago.      Pulmonary Assessment:  Acute on chronic hypoxic respiratory failure  Dependence on supplemental oxygen  Bilateral pulm infiltrate/post-COVID syndrome  Secondary bacterial pneumonia  History of interstitial lung disease  Chronic congestive diastolic heart failure with preserved EF.  Chronic obstructive pulmonary disease.  No carcinoma the right upper lobe of the lung with  metastasis  Status post chemotherapy and radiation treatment  Allergic rhinitis    Recommend/plan:   Patient was seen in the follow-up visit in pulmonary notes in medical floor today.  He was resting comfortably on set of oxygen.  No further epistaxis.  Using saline for nasal dryness  He is breathing better after the humidifier is added and he had no further epistaxis.  ENT is also following  The 10 L concentrator is arranged at home as reported by the case management  COVID treatment completed he will continue his home inhalers and nebulizers  He will continue bronchodilator treatment routine respiratory care when in the hospital..    Encouraged to continue incentive spirometry  He will be requiring 6L-7L at rest with oxygen titration and 8 to 10 L on activity for now.  DVT and stress ulcer prophylaxis pain and anxiety control.  Resume prophylactic anticoagulation if needed.  Continue current care plan  Nutritional support.  Plan for outpatient pulmonary follow-up with me after discharge in a month after discharge  Labs and imaging studies from time to time.  Patient will most likely be discharged home today  CODE STATUS: Full.  Overall prognosis: Guarded.  We will follow when he is still in the hospital    Time spent by me: 35 min    Electronically signed by     Tarik Crockre MD,   Pulmonologist/Intensivist   10/2/2024, 13:27 CDT

## 2024-10-02 NOTE — PLAN OF CARE
Problem: Adult Inpatient Plan of Care  Goal: Plan of Care Review  Outcome: Progressing  Flowsheets (Taken 10/2/2024 5013)  Progress: no change  Plan of Care Reviewed With: patient  Outcome Evaluation: Patient has no c/o pain. On 7L high flow. /S 55-78 on tele. VSS. Safety maintained. Will notify MD of any changes.

## 2024-10-02 NOTE — PLAN OF CARE
Goal Outcome Evaluation:  Plan of Care Reviewed With: patient        Progress: improving  Outcome Evaluation: No change. Nasal sprays and mouthwash continue, patient does report improvement. No c/o pain. Sats WNL at rest on 7 liters. Walking ox today though and patient desats on 15 liters with activity. Patient's gait unsteady when up with therapy as well. Fall protocol in place. Cont to monitor.

## 2024-10-02 NOTE — PLAN OF CARE
Goal Outcome Evaluation:  Plan of Care Reviewed With: patient        Progress: declining  Outcome Evaluation: Pt. agreeable to therapy. Pt. c/o fatigue due to having coughing spell earlier. RT present to gather O2 sats for pulse ox. Pt. was SBA to get to EOB. With doing this, his O2 sat dropped to 84% while on 7L. Increased O2 slowly up to 10L to get o2 sat finally up to 91%. Frequent cues for pursed lip breathing. After 10 minutes, pt was able to stand and walk 3' to chair with MIN assist. O2 sat dropped again into the 80's while on 10L. Again, it took several minutes to recover. Increased to 15L to attempt ambulation. Pt. only able to walk 10' and needed MIN assist. O2 sat dropped to 81% while on 15L. Very labored breathing. Pt. unsteady with mobility. Almost 10 minutes to recover back to 93%. The activity of getting back into bed also caused his O2 sats to drop and brought on labored breathing. Pt. is unsafe to return home at this time. He is requiring MIN assist for standing and mobility. He is limited to walking only a couple of feet. This patient lives alone and would be unable to care for himself in this state. He would greatly benefit from further therapy to increase his strength and endurance. His respiratory status is greatly limiting his mobility.                                lower/abdomen/Left:

## 2024-10-02 NOTE — CASE MANAGEMENT/SOCIAL WORK
Continued Stay Note   [place]     Patient Name: Karoline Prater  MRN: [id number]  Today's Date: 10/2/2024    Admit Date: 9/19/2024    Plan: Home Health   Discharge Plan       Row Name 10/02/24 1545       Plan    Plan Home Health    Patient/Family in Agreement with Plan yes    Plan Comments Respiratory did a walk ox and pt required 15 liters to get his sats back up. He will need to be under 10 for home oxygen.                   Discharge Codes    No documentation.                 Expected Discharge Date and Time       Expected Discharge Date Expected Discharge Time    Oct 3, 2024               WILTON Nails

## 2024-10-02 NOTE — PROCEDURES
Patient Name:Karoline Prater   MRN: [id number]   Date: 10/02/24             ROOM AIR BASELINE   SpO2%    Heart Rate    Blood Pressure      EXERCISE ON ROOM AIR SpO2% EXERCISE ON O2 @  LPM SpO2%   1 MINUTE  1 MINUTE    2 MINUTES  2 MINUTES    3 MINUTES  3 MINUTES    4 MINUTES  4 MINUTES    5 MINUTES  5 MINUTES    6 MINUTES  6 MINUTES               Distance Walked   Distance Walked   Dyspnea (Pina Scale)   Dyspnea (Pina Scale)   Fatigue (Pina Scale)   Fatigue (Pina Scale)   SpO2% Post Exercise   SpO2% Post Exercise   HR Post Exercise   HR Post Exercise   Time to Recovery   Time to Recovery     Comments: Pulse ox on 7 lpm at rest 93%. Walked pt to chair about 4 feet sat dropped to 75% on 10 lpm. Pt recovered to 93% after 10 mins on 15 lpm. Walked pt on 15 lpm for 10 feet sat dropped to 81%. Pt recovered to 93% on 15 lpm. Decreased oxygen back to 7 lpm sat stayed between 91-93%.Exercise Oximetry.

## 2024-10-03 ENCOUNTER — APPOINTMENT (OUTPATIENT)
Dept: CT IMAGING | Facility: HOSPITAL | Age: 84
DRG: 189 | End: 2024-10-03
Payer: MEDICARE

## 2024-10-03 PROCEDURE — 94799 UNLISTED PULMONARY SVC/PX: CPT

## 2024-10-03 PROCEDURE — 99232 SBSQ HOSP IP/OBS MODERATE 35: CPT | Performed by: INTERNAL MEDICINE

## 2024-10-03 PROCEDURE — 25010000002 METHYLPREDNISOLONE PER 125 MG: Performed by: INTERNAL MEDICINE

## 2024-10-03 PROCEDURE — 94761 N-INVAS EAR/PLS OXIMETRY MLT: CPT

## 2024-10-03 PROCEDURE — 25510000001 IOPAMIDOL PER 1 ML: Performed by: FAMILY MEDICINE

## 2024-10-03 PROCEDURE — 97530 THERAPEUTIC ACTIVITIES: CPT

## 2024-10-03 PROCEDURE — 99497 ADVNCD CARE PLAN 30 MIN: CPT | Performed by: CLINICAL NURSE SPECIALIST

## 2024-10-03 PROCEDURE — 94664 DEMO&/EVAL PT USE INHALER: CPT

## 2024-10-03 PROCEDURE — 71275 CT ANGIOGRAPHY CHEST: CPT

## 2024-10-03 PROCEDURE — 97110 THERAPEUTIC EXERCISES: CPT

## 2024-10-03 PROCEDURE — 99223 1ST HOSP IP/OBS HIGH 75: CPT | Performed by: CLINICAL NURSE SPECIALIST

## 2024-10-03 RX ORDER — IOPAMIDOL 755 MG/ML
100 INJECTION, SOLUTION INTRAVASCULAR
Status: COMPLETED | OUTPATIENT
Start: 2024-10-03 | End: 2024-10-03

## 2024-10-03 RX ORDER — BUSPIRONE HYDROCHLORIDE 5 MG/1
5 TABLET ORAL
Status: DISCONTINUED | OUTPATIENT
Start: 2024-10-03 | End: 2024-10-07 | Stop reason: HOSPADM

## 2024-10-03 RX ORDER — HYDROCODONE BITARTRATE AND ACETAMINOPHEN 5; 325 MG/1; MG/1
1 TABLET ORAL 3 TIMES DAILY
Status: DISCONTINUED | OUTPATIENT
Start: 2024-10-03 | End: 2024-10-07 | Stop reason: HOSPADM

## 2024-10-03 RX ORDER — IOPAMIDOL 755 MG/ML
100 INJECTION, SOLUTION INTRAVASCULAR
Status: DISCONTINUED | OUTPATIENT
Start: 2024-10-03 | End: 2024-10-03

## 2024-10-03 RX ORDER — METHYLPREDNISOLONE SODIUM SUCCINATE 125 MG/2ML
125 INJECTION, POWDER, LYOPHILIZED, FOR SOLUTION INTRAMUSCULAR; INTRAVENOUS ONCE
Status: COMPLETED | OUTPATIENT
Start: 2024-10-03 | End: 2024-10-03

## 2024-10-03 RX ORDER — ZOLPIDEM TARTRATE 5 MG/1
2.5 TABLET ORAL NIGHTLY PRN
Status: DISCONTINUED | OUTPATIENT
Start: 2024-10-03 | End: 2024-10-07 | Stop reason: HOSPADM

## 2024-10-03 RX ORDER — PREDNISONE 20 MG/1
40 TABLET ORAL
Status: DISCONTINUED | OUTPATIENT
Start: 2024-10-04 | End: 2024-10-07 | Stop reason: HOSPADM

## 2024-10-03 RX ADMIN — TAMSULOSIN HYDROCHLORIDE 0.4 MG: 0.4 CAPSULE ORAL at 21:20

## 2024-10-03 RX ADMIN — EMPAGLIFLOZIN 10 MG: 10 TABLET, FILM COATED ORAL at 08:12

## 2024-10-03 RX ADMIN — IPRATROPIUM BROMIDE AND ALBUTEROL SULFATE 3 ML: .5; 3 SOLUTION RESPIRATORY (INHALATION) at 10:42

## 2024-10-03 RX ADMIN — SODIUM BICARBONATE 650 MG: 650 TABLET ORAL at 21:20

## 2024-10-03 RX ADMIN — ZOLPIDEM TARTRATE 2.5 MG: 5 TABLET ORAL at 21:20

## 2024-10-03 RX ADMIN — SODIUM BICARBONATE 650 MG: 650 TABLET ORAL at 08:13

## 2024-10-03 RX ADMIN — SALINE NASAL SPRAY 2 SPRAY: 1.5 SOLUTION NASAL at 12:10

## 2024-10-03 RX ADMIN — GUAIFENESIN 600 MG: 600 TABLET, EXTENDED RELEASE ORAL at 08:12

## 2024-10-03 RX ADMIN — SODIUM BICARBONATE 650 MG: 650 TABLET ORAL at 15:24

## 2024-10-03 RX ADMIN — ACETAMINOPHEN 1000 MG: 500 TABLET, FILM COATED ORAL at 21:20

## 2024-10-03 RX ADMIN — SALINE NASAL SPRAY 2 SPRAY: 1.5 SOLUTION NASAL at 21:27

## 2024-10-03 RX ADMIN — ISOSORBIDE MONONITRATE 30 MG: 30 TABLET, EXTENDED RELEASE ORAL at 12:10

## 2024-10-03 RX ADMIN — SALINE NASAL SPRAY 2 SPRAY: 1.5 SOLUTION NASAL at 17:44

## 2024-10-03 RX ADMIN — FLUTICASONE PROPIONATE 2 SPRAY: 50 SPRAY, METERED NASAL at 08:13

## 2024-10-03 RX ADMIN — IPRATROPIUM BROMIDE AND ALBUTEROL SULFATE 3 ML: .5; 3 SOLUTION RESPIRATORY (INHALATION) at 07:00

## 2024-10-03 RX ADMIN — CETIRIZINE HYDROCHLORIDE 10 MG: 10 TABLET ORAL at 08:13

## 2024-10-03 RX ADMIN — IOPAMIDOL 100 ML: 755 INJECTION, SOLUTION INTRAVENOUS at 13:44

## 2024-10-03 RX ADMIN — IPRATROPIUM BROMIDE AND ALBUTEROL SULFATE 3 ML: .5; 3 SOLUTION RESPIRATORY (INHALATION) at 14:40

## 2024-10-03 RX ADMIN — PANTOPRAZOLE SODIUM 40 MG: 40 TABLET, DELAYED RELEASE ORAL at 08:13

## 2024-10-03 RX ADMIN — ASPIRIN 81 MG: 81 TABLET, COATED ORAL at 08:13

## 2024-10-03 RX ADMIN — Medication 10 ML: at 08:13

## 2024-10-03 RX ADMIN — IPRATROPIUM BROMIDE AND ALBUTEROL SULFATE 3 ML: .5; 3 SOLUTION RESPIRATORY (INHALATION) at 19:30

## 2024-10-03 RX ADMIN — BUDESONIDE AND FORMOTEROL FUMARATE DIHYDRATE 2 PUFF: 160; 4.5 AEROSOL RESPIRATORY (INHALATION) at 19:30

## 2024-10-03 RX ADMIN — Medication 2 SPRAY: at 08:13

## 2024-10-03 RX ADMIN — HYDROCODONE BITARTRATE AND ACETAMINOPHEN 1 TABLET: 5; 325 TABLET ORAL at 21:20

## 2024-10-03 RX ADMIN — METHYLPREDNISOLONE SODIUM SUCCINATE 125 MG: 125 INJECTION, POWDER, FOR SOLUTION INTRAMUSCULAR; INTRAVENOUS at 22:40

## 2024-10-03 RX ADMIN — HYDROCODONE BITARTRATE AND ACETAMINOPHEN 1 TABLET: 5; 325 TABLET ORAL at 15:24

## 2024-10-03 RX ADMIN — SALINE NASAL SPRAY 2 SPRAY: 1.5 SOLUTION NASAL at 07:35

## 2024-10-03 RX ADMIN — METOPROLOL SUCCINATE 25 MG: 25 TABLET, EXTENDED RELEASE ORAL at 08:13

## 2024-10-03 RX ADMIN — ATORVASTATIN CALCIUM 20 MG: 10 TABLET, FILM COATED ORAL at 21:25

## 2024-10-03 RX ADMIN — GUAIFENESIN 600 MG: 600 TABLET, EXTENDED RELEASE ORAL at 21:20

## 2024-10-03 RX ADMIN — Medication 10 ML: at 21:21

## 2024-10-03 RX ADMIN — SALINE NASAL SPRAY 2 SPRAY: 1.5 SOLUTION NASAL at 14:03

## 2024-10-03 RX ADMIN — BUDESONIDE AND FORMOTEROL FUMARATE DIHYDRATE 2 PUFF: 160; 4.5 AEROSOL RESPIRATORY (INHALATION) at 07:00

## 2024-10-03 RX ADMIN — BUSPIRONE HYDROCHLORIDE 5 MG: 5 TABLET ORAL at 17:44

## 2024-10-03 RX ADMIN — POLYETHYLENE GLYCOL 3350 17 G: 17 POWDER, FOR SOLUTION ORAL at 08:12

## 2024-10-03 NOTE — PLAN OF CARE
Problem: Adult Inpatient Plan of Care  Goal: Plan of Care Review  Outcome: Progressing  Flowsheets (Taken 10/3/2024 0401)  Progress: no change  Plan of Care Reviewed With: patient  Outcome Evaluation: Patient has no c/o pain. On 7L high flow of O2. SB/ 52-65 on tele. VSS. Safety maintained. Will notify MD of any changes.

## 2024-10-03 NOTE — PLAN OF CARE
Goal Outcome Evaluation:  Plan of Care Reviewed With: patient        Progress: no change  Outcome Evaluation: PT tx completed. Pt. states he feels about the same as yesterday. With any activity, he becomes SOA and demonstrates labored breathing. Increased his O2 from 7l-10L while pt performed bed mobility and his O2 sat still dropped to 85%. Increased to 12L and after several minutes, O2 increased to 93%. Pt. stood and walked 3' to chair. After resting several minutes, pt actively participated with LE exercises in chair with rest breaks. O2 sat maintained 93-95% while on 12L. Pt. left in chair for lunch. Will continue to work with pt on mobility as tolerated.

## 2024-10-03 NOTE — CASE MANAGEMENT/SOCIAL WORK
Continued Stay Note   [place]     Patient Name: Karoline Prater  MRN: [id number]  Today's Date: 10/3/2024    Admit Date: 9/19/2024    Plan: SNF   Discharge Plan       Row Name 10/03/24 1527       Plan    Plan SNF    Patient/Family in Agreement with Plan yes    Provided Post Acute Provider List? Yes    Post Acute Provider List Nursing Home    Delivered To Patient    Method of Delivery Telephone    Plan Comments Palliative has been consulted.  Pt now agrees to SNF; pt prefers [facility].  SW has informed Radha in admissions.  Pt aware of hospice options but is unsure at this time if he wants to go this route.                   Discharge Codes    No documentation.                 Expected Discharge Date and Time       Expected Discharge Date Expected Discharge Time    Oct 3, 2024               NEREYDA YeungW

## 2024-10-03 NOTE — CASE MANAGEMENT/SOCIAL WORK
Continued Stay Note   [place]     Patient Name: Karoline Prater  MRN: [id number]  Today's Date: 10/3/2024    Admit Date: 9/19/2024    Plan: TBD   Discharge Plan       Row Name 10/03/24 1120       Plan    Plan TBD    Patient/Family in Agreement with Plan yes    Plan Comments Respiratory did a walk ox and pt required 15 liters to get his sats back up. He will need to be under 10 for home oxygen.  Palliative consult?                   Discharge Codes    No documentation.                 Expected Discharge Date and Time       Expected Discharge Date Expected Discharge Time    Oct 3, 2024               NEREYDA YeungW

## 2024-10-03 NOTE — CONSULTS
[facility] Palliative Care Services    Palliative Care Initial Consult   Attending Physician: Wliber Carbajal,*  Referring Provider: Courtney Osullivan RN/Wilber Carbajal MD    Reason for Referral: assistance with clarification of goals of care  Family/Support: Brother and son    Code Status and Medical Interventions: CPR (Attempt to Resuscitate); Full Support   Ordered at: 09/20/24 0047     Code Status (Patient has no pulse and is not breathing):    CPR (Attempt to Resuscitate)     Medical Interventions (Patient has pulse or is breathing):    Full Support     Goals of Care: TBD.    HPI:   84 y.o. 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 Michele and completed 4 cycles of combination chemotherapy with carboplatin, Keytruda, and Alimta, maintenance Keytruda and Alimta every 3 weeks with final cycle #25 on 10/29/2020, completed SBRT radiotherapy x 5 fractions for to right lung nodules 2/7/2024, Keytruda on hold due to issues of dyspnea and shortness of breath requiring steroids.  PET scan 12/7/2023 showed progression disease in right upper lobe of the lung and referred to radiation oncology for SBRT.  A PET scan was completed 8/15 which showed spiculated pleural-based nodule in the medial right lung apex, there is also spiculated nodule in right upper lobe anteriorly that measures 1.3 cm which is suspicious for active neoplasm.  Concerning the pleural-based mass in the right upper lobe this may be related to an area of resolving pneumonia given the interval decrease in size.  Hypermetabolic subcarinal lymphadenopathy is present suspicious for metastatic lymphadenopathy.  Plan for follow-up with Dr. Crocker 9/24 anticipating possible navigational bronchoscopy..  Dr. Michele spoke with Dr. Cool who reports Mr. Prater had declined getting a biopsy stating if it was positive  his intentions were not to proceed with further systemic therapy, if so desired potential palliative XRT to area of concern.  Additional health history positive for COPD, fibrotic lung disease, chronic respiratory failure-2.5-3 L baseline, HFpEF-EF 61 to 65% 7/3/2024, pulmonary hypertension-mild, COVID-9/5/2024, iron deficiency anemia, arthritis, Atrial fibrillation, Blindness of left eye, BPH with obstruction/lower urinary tract symptoms, CKD, Coronary artery disease, DVT, hyperlipidemia, hypertension, GERD, Hearing loss, Hydronephrosis of left kidney, IgG kappa MGUS, smoking history, impaired mobility-uses cane/walker, and Pancreatic mass (10/29/2003) s/p resection Klaudia-en-Y procedure and a choledochojejunostomy-biopsies negative for malignancy showing a fibrosed pancreas.  Recent hospitalization 9/5-9/8 due to cytokine release syndrome, grade 2, pneumonia, COVID-19 virus infection, discharged home; 8/26-8/30 due to acute kidney injury, narrow complex tachycardia, atrial flutter, evaluated by EP cardiology and underwent AVM ablation and pacemaker by Dr. Bowen 8/29, discharged home with home health; 8/2-8/8 due to acute on chronic respiratory failure and COPD with acute exacerbation, discharged home; 6/29-7/4 due to chest pain, tachycardia, acute renal failure, refused home health/rehab and discharged home.  Last seen by this provider during August hospitalization at which time CODE STATUS changes implemented to no CPR/limited support interventions, no intubation, and no permanent feeding tube.  A MOST document was completed and currently on file.  At that time we also discussed patient's unfortunate decline in performance status and potential need for nursing facility placement, as well as, options of best supportive care directed by hospice. Patient presented to [facility] on [facility]/2024 related to shortness of breath x 3 days prior to admission.  Required high flow oxygen and transition to Vapotherm on  "admission.  Received IV diuresis, DuoNebs, Symbicort, incentive spirometer, and additional adjuvants.  Pulmonology consulted and recommended IV antibiotics.  CT chest 9/21 showed spiculated masslike opacity of the right upper lobe similar when compared to 8/3/2024 study but increased compared to 6/29/2024.  Neoplastic process must be considered.  Stable scarring suspected right lung apex.  Nonspecific irregular 2 x 1 cm nodular focus right middle lobe recommended continued imaging surveillance.  Advanced chronic interstitial lung disease with bronchiectasis.  Developed acute kidney injury and diuretics decreased. Developed epistaxis and ENT consulted recommends nasal hygiene and felt symptoms due to chronic rhinitis.  Case management has arranged 10 L oxygen concentrator for home.  Plan for outpatient pulmonology follow-up in 1 month.  Unfortunately required up to 12-15 L over the last 2 days with labored breathing when working with therapy with minimal exertion.  Reports limited to walking only a couple of feet, lives alone, and would be unable to care for himself \"would greatly benefit from further therapy to increase his strength and endurance\".  Laying in bed currently on 10 L high flow oxygen, dyspnea at rest worsened with conversation.  No family currently at bedside. Palliative Care Spoke With: patient reports significant decline in quality of life and functional status starting in June of this year leading to multiple hospitalizations. Due to the Palliative Care Topics Discussed: palliative care, goals of care, care options, resuscitation status, [facility], and discharge options we will establish an advance care plan.   Advance Care Planning   Advance Care Planning Discussion: Patient agreeable to conversation.  Discussed events leading to current hospitalization and reports \"a 92-year-old man gave me COVID\" and I have been going downhill since.  Verbalizes he is no longer able to meet his self-care needs " "\"and my brother can help me like I need to be helped\".  States he is agreeable to nursing facility placement and would like referral placed to [facility] in [place].  We further discussed concerns with regard to aberrant findings suspicious for progression of lung cancer, systemic therapy intolerance in past, advanced pulmonary fibrosis, COPD, high flow oxygen needs, decline despite aggressive care interventions, and multiple comorbidities.  Verbalizes plans were to pursue bronchoscopy for biopsy.  We further discussed limitations and systemic therapy given prior intolerance.  He is aware of options of palliative radiation, however he has had most notable decline after acquiring COVID in early September.  We explored medical priorities as previously discussed and patient is elected to de-escalate care measures to no CPR/limited support interventions, no intubation, no permanent feeding tube.  We further discussed best supportive care directed by hospice \"I will have to think about that\".  He is understandably concerned about his overall decline in health and his future forward.  I encouraged patient to further discuss his health decline with his son who resides in [place].  We discussed goals of symptom management with anxiolytics and low-dose opiate trial which patient is agreeable to.  Questions encouraged and support given.     Review of Systems   Constitutional: Positive for malaise/fatigue.   Cardiovascular:  Positive for leg swelling.   Respiratory:  Positive for shortness of breath.    Musculoskeletal:  Positive for muscle weakness.   Genitourinary:  Negative for dysuria.   Neurological:  Positive for weakness.   Psychiatric/Behavioral:  The patient is nervous/anxious.      1- Pain Assessment  Nonverbal Indicators of Pain: nonverbal indicators absent    Past Medical History:   Diagnosis Date    Arthritis     Atrial fibrillation with rapid ventricular response 05/31/2019    Blindness of left eye "     BPH with obstruction/lower urinary tract symptoms     Cancer     stomach & lung    CHF (congestive heart failure)     CKD (chronic kidney disease) stage 3, GFR 30-59 ml/min     COPD with acute exacerbation 08/03/2024    Coronary artery disease     Elevated cholesterol     Essential hypertension 10/02/2017    Fibrotic lung diseases     GERD (gastroesophageal reflux disease)     Hearing loss     History of transfusion     Hydronephrosis of left kidney     Hyperlipidemia     Hypertension     pt was taken off of all of his medications for BP (atenolol, lisinopril, lasix) because his BP kept bottoming out so his primary dr told him to discontinue them 1-2 months ago (Jan/Feb 2019). pt states he takes no medications currently.    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 Patel is just going to keep an eye on it and not do surgery right now.    Mediastinal adenopathy 10/24/2018    Station 7    Monoclonal gammopathy of unknown significance (MGUS) 09/11/2018    Pancreatic mass     pt states he had this in 2013 but it went away on its own. Now recent CT shows it has come back so he is going to have an ultrasound on 3/13/19.    Shortness of breath      Past Surgical History:   Procedure Laterality Date    ABDOMINAL SURGERY      BRONCHOSCOPY N/A 10/24/2018    Procedure: BRONCHOSCOPY WITH BIOPSY, EBUS;  Surgeon: Gareth Becerra MD;  Location: [facility] OR;  Service: Pulmonary    CHOLECYSTECTOMY      COLONOSCOPY N/A 1/3/2019    Procedure: COLONOSCOPY WITH ANESTHESIA;  Surgeon: Randy Somers DO;  Location: [facility] ENDOSCOPY;  Service: Gastroenterology    CYSTOSCOPY, RETROGRADE PYELOGRAM AND STENT INSERTION Left 3/8/2019    Procedure: CYSTOSCOPY RETROGRADE BILATERAL PYELOGRAM;  Surgeon: Jos Sylvester MD;  Location: [facility] OR;  Service: Urology    ENDOSCOPY N/A  2018    Procedure: ESOPHAGOGASTRODUODENOSCOPY WITH ANESTHESIA;  Surgeon: Randy Somers DO;  Location: [facility] ENDOSCOPY;  Service: Gastroenterology    ENDOSCOPY N/A 2019    Procedure: ESOPHAGOGASTRODUODENOSCOPY WITH ANESTHESIA;  Surgeon: Lilliam Jj MD;  Location: [facility] OR;  Service: Gastroenterology    ENDOSCOPY N/A 2019    Procedure: ESOPHAGOGASTRODUODENOSCOPY WITH ANESTHESIA;  Surgeon: Pilo Bansal MD;  Location: [facility] ENDOSCOPY;  Service: Gastroenterology    EYE SURGERY Left 1964    FOOT SURGERY Right 1966    joint    FRACTURE SURGERY      PACEMAKER IMPLANTATION Left 2024    Procedure: Leadless pacemaker implant and AVN ablation;  Surgeon: Carlitos Bowen MD;  Location: [facility] CATH INVASIVE LOCATION;  Service: Cardiovascular;  Laterality: Left;     Social History     Socioeconomic History    Marital status: Single   Tobacco Use    Smoking status: Former     Current packs/day: 0.00     Types: Cigarettes     Start date: 10/29/1954     Quit date: 10/29/2003     Years since quittin.9    Smokeless tobacco: Former     Types: Chew     Quit date:    Vaping Use    Vaping status: Never Used   Substance and Sexual Activity    Alcohol use: No    Drug use: No    Sexual activity: Defer         Current Facility-Administered Medications:     acetaminophen (TYLENOL) tablet 650 mg, 650 mg, Oral, Q4H PRN **OR** acetaminophen (TYLENOL) 160 MG/5ML oral solution 650 mg, 650 mg, Oral, Q4H PRN **OR** acetaminophen (TYLENOL) suppository 650 mg, 650 mg, Rectal, Q4H PRN, Wilber Carbajal MD    acetaminophen (TYLENOL) tablet 1,000 mg, 1,000 mg, Oral, Nightly, Wilber Carbajal MD, 1,000 mg at 10/02/24 2039    aspirin EC tablet 81 mg, 81 mg, Oral, Daily, Wilber Carbajal MD, 81 mg at 10/03/24 0813    atorvastatin (LIPITOR) tablet 20 mg, 20 mg, Oral, Nightly, Wilber Carbajal MD, 20 mg at 10/02/24 2038    sennosides-docusate (PERICOLACE) 8.6-50 MG per tablet 2 tablet,  2 tablet, Oral, BID PRN, 2 tablet at 10/02/24 2038 **AND** polyethylene glycol (MIRALAX) packet 17 g, 17 g, Oral, Daily PRN, 17 g at 10/03/24 0812 **AND** bisacodyl (DULCOLAX) EC tablet 5 mg, 5 mg, Oral, Daily PRN **AND** bisacodyl (DULCOLAX) suppository 10 mg, 10 mg, Rectal, Daily PRN, Wilber Carbajal MD    budesonide-formoterol (SYMBICORT) 160-4.5 MCG/ACT inhaler 2 puff, 2 puff, Inhalation, BID - RT, Wilber Carbajal MD, 2 puff at 10/03/24 0700    cetirizine (zyrTEC) tablet 10 mg, 10 mg, Oral, Daily, Tarik Crocker MD, 10 mg at 10/03/24 0813    dextromethorphan polistirex ER (DELSYM) 30 MG/5ML oral suspension 60 mg, 60 mg, Oral, Q12H PRN, Wilber Carbajal MD    diphenhydrAMINE (BENADRYL) capsule 25 mg, 25 mg, Oral, Nightly PRN, Wilber Carbajal MD, 25 mg at 09/22/24 2149    empagliflozin (JARDIANCE) tablet 10 mg, 10 mg, Oral, Daily, Irving Power DO, 10 mg at 10/03/24 0812    fluticasone (FLONASE) 50 MCG/ACT nasal spray 2 spray, 2 spray, Each Nare, Daily, Tarik Crocker MD, 2 spray at 10/03/24 0813    guaiFENesin (MUCINEX) 12 hr tablet 600 mg, 600 mg, Oral, Q12H, Wilber Carbajal MD, 600 mg at 10/03/24 0812    ipratropium-albuterol (DUO-NEB) nebulizer solution 3 mL, 3 mL, Nebulization, 4x Daily - RT, Nitin, Mariam Lobato, APRN, 3 mL at 10/03/24 1042    isosorbide mononitrate (IMDUR) 24 hr tablet 30 mg, 30 mg, Oral, Daily Before Lunch, Wilber Carbajal MD, 30 mg at 10/03/24 1210    melatonin tablet 10 mg, 10 mg, Oral, Nightly, Wilber Carbajal MD, 10 mg at 10/02/24 2039    metoprolol succinate XL (TOPROL-XL) 24 hr tablet 25 mg, 25 mg, Oral, Daily, Wilber Carbajal MD, 25 mg at 10/03/24 0813    nitroglycerin (NITROSTAT) SL tablet 0.4 mg, 0.4 mg, Sublingual, Q5 Min PRN, Wilber Carbajal MD    pantoprazole (PROTONIX) EC tablet 40 mg, 40 mg, Oral, Daily, Wilber Carbajal MD, 40 mg at 10/03/24 0813    sodium bicarbonate tablet 650  "mg, 650 mg, Oral, TID, Wilber Carbajal MD, 650 mg at 10/03/24 0813    sodium chloride 0.9 % flush 10 mL, 10 mL, Intravenous, Q12H, Wilber Carbajal MD, 10 mL at 10/03/24 0813    sodium chloride 0.9 % flush 10 mL, 10 mL, Intravenous, PRN, Wilber Carbajal MD, 10 mL at 09/23/24 0632    sodium chloride 0.9 % infusion 40 mL, 40 mL, Intravenous, PRN, Wilber Carbajal MD    sodium chloride nasal spray 2 spray, 2 spray, Each Nare, 5x Daily, Vijay James Jr., MD, 2 spray at 10/03/24 1210    sodium chloride nasal spray 2 spray, 2 spray, Each Nare, Continuous PRN, Vijay James Jr., MD    tamsulosin (FLOMAX) 24 hr capsule 0.4 mg, 0.4 mg, Oral, Nightly, Wilber Carbajal MD, 0.4 mg at 10/02/24 2038    traZODone (DESYREL) tablet 25 mg, 25 mg, Oral, Nightly PRN, Tarik Crocker MD, 25 mg at 09/25/24 2043    Allergies   Allergen Reactions    Penicillins Hives     I have utilized all available immediate resources to obtain, update, or review the patient's current medications (including all prescriptions, over-the-counter products, herbals, cannabis/cannabidiol products, and vitamin/mineral/dietary (nutritional) supplements) for name, route of administration, type, dose and frequency.      Intake/Output Summary (Last 24 hours) at 10/3/2024 1300  Last data filed at 10/3/2024 0900  Gross per 24 hour   Intake 480 ml   Output 1225 ml   Net -745 ml       Physical Exam:    Diagnostics: Reviewed  /61 (BP Location: Left arm, Patient Position: Sitting)   Pulse 62   Temp 97.4 °F (36.3 °C) (Oral)   Resp 20   Ht 162.6 cm (64\")   Wt 70.1 kg (154 lb 9.6 oz)   SpO2 95%   BMI 26.54 kg/m²     Vitals and nursing note reviewed.   Constitutional:       Appearance: Frail. Ill-appearing and acutely ill-appearing.      Comments: High flow oxygen.  Was on 10 L and this was decreased to 7 L and monitored for several minutes during conversation of which patient remained with O2 " saturation 88% to 90%.  Discussed with respiratory.   Eyes:      Comments: Left eye blindness/opacity   HENT:      Head: Normocephalic.   Pulmonary:      Effort: Increased respiratory effort. Tachypnea and accessory muscle usage present.      Breath sounds: Decreased breath sounds present.   Cardiovascular:      Normal rate.   Edema:     Peripheral edema present.  Abdominal:      Palpations: Abdomen is soft.   Musculoskeletal: Normal range of motion.      Cervical back: Neck supple. Skin:     General: Skin is warm.   Genitourinary:     Comments: No reported dysuria  Neurological:      Mental Status: Alert, oriented to person, place, and time and oriented to person, place and time.   Psychiatric:         Mood and Affect: Mood is anxious.         Cognition and Memory: Cognition normal.     Patient status: Disease state: Deteriorating despite treatments.  Current Functional status: Palliative Performance Scale Score: Performance 40% based on the following measures: Ambulation: Mainly in bed, Activity and Evidence of Disease: Unable to do any work, extensive evidence of disease, Self-Care: Mainly assistance required,  Intake: Normal or reduced, LOC: Full, drowsy or confusion   Baseline Functional status: Palliative Performance Scale Score: Performance 40% based on the following measures: Ambulation: Mainly in bed, Activity and Evidence of Disease: Unable to do any work, extensive evidence of disease, Self-Care: Mainly assistance required,  Intake: Normal or reduced, LOC: Full, drowsy or confusion   Baseline ECOG Status(4) Completely disabled, unable to carry out self-care.  Totally confined to bed or chair.  Nutritional status: Albumin 2.8 Body mass index is 26.54 kg/m².         Hospital Problem List      Acute on chronic respiratory failure with hypoxia    Essential hypertension    Malignant neoplasm of upper lobe of right lung    Stage 3b chronic kidney disease    Pulmonary fibrosis    A-fib    COPD (chronic  obstructive pulmonary disease)    Chronic heart failure with preserved ejection fraction (HFpEF)    COVID-19 virus infection    Bacterial pneumonia    Pneumonitis    Acute-on-chronic respiratory failure    Epistaxis    Chronic rhinitis    Impression/Problem List:    Stage IV right upper lobe of lung adenocarcinoma 11/27/2018 followed by Dr. Michele, PET scan 8/15 shows concerns for recurrent/progression of disease-right upper lobe and subcarinal ymphadenopathy mets?  COPD with acute exacerbation  Acute on chronic respiratory failure with hypoxia, 2.5-3 L baseline  Chronic kidney disease stage IV  Pulmonary fibrosis, advanced possible radiation fibrosis  IgG kappa MGUS  History of Pancreatic mass (10/29/2003) s/p resection Klaudia-en-Y procedure and a choledochojejunostomy-biopsies negative for malignancy showing a fibrosed pancreas    Former smoker  Hyperkalemia  Bilateral lower extremity edema  HFpEF-EF 61 to 65% 7/3/2024,  Pulmonary hypertension-mild  Iron deficiency anemia  Atrial fibrillation  Blindness of left eye  BPH   Coronary artery disease  DVT  Hyperlipidemia  Hypertension  GERD  Hearing loss  Smoking history  Impaired mobility-uses cane/walker   Advanced age      Recommendations/Plan:  1. plan: Goals of care include CODE STATUS no CPR/limited support interventions.    Family support: The patient receives support from his  brother ..  Advance Directives: Advance Directive Status: Patient does not have advance directive   POA/Healthcare surrogate-son, Beau and brother-next of kin.    2.  Palliative care encounter  - Prognosis is poor long-term secondary to secondary to stage IV metastatic adenocarcinoma of the lung, COPD exacerbation, acute on chronic respiratory failure, chronic kidney disease stage IV, pulmonary fibrosis, multiple comorbidities, and advanced age.  -Patient appears to have fair prognostic awareness.     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 Michele and completed 4 cycles of combination chemotherapy with carboplatin, Keytruda, and Alimta, maintenance Keytruda and Alimta every 3 weeks with final cycle #25 on 10/29/2020, completed SBRT radiotherapy x 5 fractions for to right lung nodules 2/7/2024, Keytruda on hold due to issues of dyspnea and shortness of breath requiring steroids.  PET scan 12/7/2023 showed progression disease in right upper lobe of the lung and referred to radiation oncology for SBRT.    -8/15-PET scan showed spiculated pleural-based nodule in the medial right lung apex, there is also spiculated nodule in right upper lobe anteriorly that measures 1.3 cm which is suspicious for active neoplasm.  Concerning the pleural-based mass in the right upper lobe this may be related to an area of resolving pneumonia given the interval decrease in size.  Hypermetabolic subcarinal lymphadenopathy is present suspicious for metastatic lymphadenopathy.  Plan for follow-up with Dr. Crocker 9/24 anticipating possible navigational bronchoscopy.    9/3-last seen by Dr. Danny Cool.  PET scan is suspicious for recurrent disease.  Biopsy for confirmation is offered but the patient declines.  At this point he desires supportive care only.  9/18-Last seen by Dr. Michele. Dr. Michele spoke with Dr. Cool who reports Mr. Prater had declined getting a biopsy stating if it was positive his intentions were not to proceed with further systemic therapy, if so desired potential palliative XRT to area of concern.        -Last seen by this provider during August hospitalization at which time CODE STATUS changes implemented to no CPR/limited support interventions, no intubation, and no permanent feeding tube.  A MOST document was completed and currently on file.  At that time we also discussed patient's unfortunate decline in performance status and potential need for nursing facility placement, as well as, options of best supportive  "care directed by hospice.     -Required high flow oxygen and transition to Vapotherm on admission.  Received IV diuresis, DuoNebs, Symbicort, incentive spirometer, and additional adjuvants.    -Pulmonology consulted and recommended IV antibiotics.  Plan for outpatient pulmonology follow-up in 1 month.    -Developed acute kidney injury and diuretics decreased.   -Developed epistaxis and ENT consulted recommends nasal hygiene and felt symptoms due to chronic rhinitis.    -Case management has arranged 10 L oxygen concentrator for home.   10/3-Unfortunately required up to 12-15 L over the last 2 days with labored breathing when working with therapy with minimal exertion.  Reports limited to walking only a couple of feet, lives alone, and would be unable to care for himself \"would greatly benefit from further therapy to increase his strength and endurance\".    10/3-CODE STATUS changes no CPR/limited support interventions, no intubation, no permanent feeding tube.  A MOST document was completed 8/5/2024 and current.  -Discussed further de-escalation and best supportive care directed by hospice.  Encouraged ongoing consideration given poor long-term prognosis, progressive decline, and multiple comorbid factors.  -High risk rehospitalizations  -Agreeable to SNF at discharge, prefers [facility].  Would obviously need to continue weaning efforts of oxygen therapy, uncertain of SNF capacity to provide high-level oxygen.    3.  Anxiety  -Add BuSpar 3 times daily    4.  Dyspnea at rest  -Add low-dose opiate trial scheduled      Thank you for this consult and allowing us to participate in patient's plan of care. Palliative Care Team will continue to follow patient.     20 minutes spent on advance care planning-discussing with patient and/or family, answering questions, providing some guidance about a plan and documentation of care, and coordinating care face to face.    Grace Aly, APRN  10/3/2024  13:00 " CDT

## 2024-10-03 NOTE — PLAN OF CARE
Goal Outcome Evaluation:  Plan of Care Reviewed With: patient        Progress: no change     Pt A&O x4. Palliative care consulted and pt made DNR/DNI per request. Meds given per MAR. CTA completed. Required increased oxygen due to increased activity during the day. Safety measures in place.

## 2024-10-03 NOTE — PROGRESS NOTES
[facility] Medicine Services  INPATIENT PROGRESS NOTE    Patient Name: Karoline Prater  Date of Admission: 9/19/2024  Today's Date: 10/03/24  Length of Stay: 13  Primary Care Physician: Irving Alexis MD    Subjective   Chief Complaint: Shortness of breath  HPI   10/1 Patient dyspneic at rest continues to require 7 to 10 L nasal cannula.  He has had epistaxis that is now resolved.  10/02 Patient has ambulated with pulse oximetry and had significant hypoxemia requiring 15 L oxygen nasal cannula to recover.  Although he does appear at baseline.    Today:  No new complaints.  Nasal congestion is improving no further epistaxis.  Trying to discuss poor prognosis patient seems evasive.  Will ask for a palliative care consult.    Review of Systems   All pertinent negatives and positives are as above. All other systems have been reviewed and are negative unless otherwise stated.     Objective    Temp:  [97.4 °F (36.3 °C)-98.3 °F (36.8 °C)] 97.4 °F (36.3 °C)  Heart Rate:  [54-82] 65  Resp:  [16-20] 16  BP: (112-128)/(54-63) 112/61  Physical Exam  Constitutional:       General: He is not in acute distress.     Appearance: He is not ill-appearing.   HENT:      Head: Normocephalic.      Nose:      Comments: No active epistaxis, clot on both nosetrils      Mouth/Throat:      Mouth: Mucous membranes are moist.   Eyes:      Pupils: Pupils are equal, round, and reactive to light.   Cardiovascular:      Rate and Rhythm: Normal rate.   Pulmonary:      Effort: Pulmonary effort is normal.      Comments: Scattered coarse, crackly BSs on 7LNC during my assessment with 02 sats 92-93% - he had just returned to bed shortly before my arrival  Musculoskeletal:         General: No deformity.   Skin:     General: Skin is warm.   Neurological:      Mental Status: He is alert.      Motor: Weakness present.   Psychiatric:         Mood and Affect: Mood normal.     Results Review:  I have reviewed the  "labs, radiology results, and diagnostic studies.    Laboratory Data:   Results from last 7 days   Lab Units 10/01/24  0435 09/29/24  0400   WBC 10*3/mm3 7.31 7.00   HEMOGLOBIN g/dL 11.1* 10.9*   HEMATOCRIT % 37.3* 37.1*   PLATELETS 10*3/mm3 182 159        Results from last 7 days   Lab Units 10/01/24  0339 09/29/24  0400 09/28/24  0221   SODIUM mmol/L 136 140 141   POTASSIUM mmol/L 4.8 4.2 4.3   CHLORIDE mmol/L 106 103 106   CO2 mmol/L 18.0* 25.0 26.0   BUN mg/dL 37* 42* 45*   CREATININE mg/dL 1.59* 2.09* 2.01*   CALCIUM mg/dL 10.0 10.2 9.9   GLUCOSE mg/dL 130* 103* 119*       Culture Data:   No results found for: \"BLOODCX\", \"URINECX\", \"WOUNDCX\", \"MRSACX\", \"RESPCX\", \"STOOLCX\"    Radiology Data:   Imaging Results (Last 24 Hours)       Procedure Component Value Units Date/Time    CT Angiogram Chest [[id number]] Resulted: 10/03/24 1324     Updated: 10/03/24 1345            I have reviewed the patient's current medications.     Assessment/Plan   Assessment  Active Hospital Problems    Diagnosis     **Acute on chronic respiratory failure with hypoxia     Epistaxis     Chronic rhinitis     Acute-on-chronic respiratory failure     Pneumonitis     Bacterial pneumonia     COVID-19 virus infection     Chronic heart failure with preserved ejection fraction (HFpEF)     COPD (chronic obstructive pulmonary disease)     A-fib     Pulmonary fibrosis     Stage 3b chronic kidney disease     Malignant neoplasm of upper lobe of right lung     Essential hypertension        Treatment Plan  Vitals every 4 hours  Cardiac diet  IV fluids saline lock  Up ad shae.    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    Epistaxis  ENT input noted  Controlled currently  Not amenable to clot evacuation due to risk of recurrent epistaxis  Continue local measures    Chronic congestive diastolic heart failure with preserved ejection fraction  Diuresis as needed currently on hold  Patient had baseline weight  Daily weights    CKD stage III  BMP daily  Ins and outs    Deconditioning  PT and OT evaluations    Disposition  Patient would like to return home with home health care  He is At risk of readmissions as he has had frequent hospitalizations this year, recommended palliative care but patient does not seem amenable to consider other     Medical Decision Making  Number and Complexity of problems: 4 complex problems        Conditions and Status        Condition is unchanged.     MDM Data  External documents reviewed: none  Cardiac tracing (EKG, telemetry) interpretation: no new EKGs this AM  Radiology interpretation: no new radiology studies; chest x-ray 9/30/2024   Labs reviewed: as above  Any tests that were considered but not ordered: none     Decision rules/scores evaluated (example [id number], Wells, etc): none     Discussed with: patient     Care Planning  Shared decision making: Discussed with patient with agreement to proceed with treatment plan as outlined  Code status and discussions: Full code     Disposition  Social Determinants of Health that impact treatment or disposition: None apparent at this time; patient does live at home alone  I expect the patient to be discharged to home with home health (possibly) when 02 requirement has improved (still requiring 8-10LNC with even minimal activity)    Electronically signed by Wilber Carbajal MD, 10/03/24, 15:03 CDT.

## 2024-10-03 NOTE — THERAPY TREATMENT NOTE
Acute Care - Physical Therapy Treatment Note  [facility]     Patient Name: Karoline Prater  : 1940  MRN: [id number]  Today's Date: 10/3/2024      Visit Dx:     ICD-10-CM ICD-9-CM   1. Hypoxia  R09.02 799.02   2. Pneumonitis  J98.4 486   3. Acute UTI  N39.0 599.0   4. Impaired mobility [Z74.09]  Z74.09 799.89   5. Chronic obstructive pulmonary disease, unspecified COPD type  J44.9 496     Patient Active Problem List   Diagnosis    Dyspnea    Essential hypertension    NSVT (nonsustained ventricular tachycardia)    Malignant neoplasm of upper lobe of right lung    Stage 3b chronic kidney disease    Pancytopenia due to antineoplastic chemotherapy    Pneumonia due to infectious organism    Function kidney decreased    Cellulitis    Acute on chronic respiratory failure with hypoxia    Pulmonary fibrosis    Macrocytic anemia    Thrombocytopenia    Sepsis    Right pneumothorax    Hyperkalemia    Acute kidney injury superimposed on CKD stage 3b    GERD without esophagitis    A-fib    Former smoker    Chest pain    Tachycardia    Bilateral lower extremity edema    Metastatic adenocarcinoma of the RUL    Chronic diastolic congestive heart failure    COPD (chronic obstructive pulmonary disease)    S/P radiation > 12 weeks    CHF (congestive heart failure)    History of radiation therapy    Chronic heart failure with preserved ejection fraction (HFpEF)    Narrow complex tachycardia    Atrial flutter    Encounter for examination for normal comparison and control in clinical research program    CHF exacerbation    COVID-19 virus infection    Cytokine release syndrome, grade 2    Bacterial pneumonia    Hyperlipidemia    Coronary artery disease    Pneumonitis    Acute-on-chronic respiratory failure    Epistaxis    Chronic rhinitis     Past Medical History:   Diagnosis Date    Arthritis     Atrial fibrillation with rapid ventricular response 2019    Blindness of left eye     BPH with obstruction/lower urinary tract  symptoms     Cancer     stomach & lung    CHF (congestive heart failure)     CKD (chronic kidney disease) stage 3, GFR 30-59 ml/min     COPD with acute exacerbation 08/03/2024    Coronary artery disease     Elevated cholesterol     Essential hypertension 10/02/2017    Fibrotic lung diseases     GERD (gastroesophageal reflux disease)     Hearing loss     History of transfusion     Hydronephrosis of left kidney     Hyperlipidemia     Hypertension     pt was taken off of all of his medications for BP (atenolol, lisinopril, lasix) because his BP kept bottoming out so his primary dr told him to discontinue them 1-2 months ago (Jan/Feb 2019). pt states he takes no medications currently.    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 Patel is just going to keep an eye on it and not do surgery right now.    Mediastinal adenopathy 10/24/2018    Station 7    Monoclonal gammopathy of unknown significance (MGUS) 09/11/2018    Pancreatic mass     pt states he had this in 2013 but it went away on its own. Now recent CT shows it has come back so he is going to have an ultrasound on 3/13/19.    Shortness of breath      Past Surgical History:   Procedure Laterality Date    ABDOMINAL SURGERY      BRONCHOSCOPY N/A 10/24/2018    Procedure: BRONCHOSCOPY WITH BIOPSY, EBUS;  Surgeon: Gareth Becerra MD;  Location: [facility] OR;  Service: Pulmonary    CHOLECYSTECTOMY      COLONOSCOPY N/A 1/3/2019    Procedure: COLONOSCOPY WITH ANESTHESIA;  Surgeon: Randy Somers DO;  Location: [facility] ENDOSCOPY;  Service: Gastroenterology    CYSTOSCOPY, RETROGRADE PYELOGRAM AND STENT INSERTION Left 3/8/2019    Procedure: CYSTOSCOPY RETROGRADE BILATERAL PYELOGRAM;  Surgeon: Jos Sylvester MD;  Location: [facility] OR;  Service: Urology    ENDOSCOPY N/A 12/11/2018    Procedure:  ESOPHAGOGASTRODUODENOSCOPY WITH ANESTHESIA;  Surgeon: Randy Somers DO;  Location: [facility] ENDOSCOPY;  Service: Gastroenterology    ENDOSCOPY N/A 4/29/2019    Procedure: ESOPHAGOGASTRODUODENOSCOPY WITH ANESTHESIA;  Surgeon: Lilliam Jj MD;  Location: [facility] OR;  Service: Gastroenterology    ENDOSCOPY N/A 5/9/2019    Procedure: ESOPHAGOGASTRODUODENOSCOPY WITH ANESTHESIA;  Surgeon: Pilo Bansal MD;  Location: [facility] ENDOSCOPY;  Service: Gastroenterology    EYE SURGERY Left 1964    FOOT SURGERY Right 1966    joint    FRACTURE SURGERY      PACEMAKER IMPLANTATION Left 8/29/2024    Procedure: Leadless pacemaker implant and AVN ablation;  Surgeon: Carlitos Bowen MD;  Location: [facility] CATH INVASIVE LOCATION;  Service: Cardiovascular;  Laterality: Left;     PT Assessment (Last 12 Hours)       PT Evaluation and Treatment       Row Name 10/03/24 1124          Physical Therapy Time and Intention    Subjective Information complains of;dyspnea  -     Document Type therapy note (daily note)  -     Mode of Treatment physical therapy  -     Comment 10-15L with activity  -       Row Name 10/03/24 1124          General Information    Existing Precautions/Restrictions fall;oxygen therapy device and L/min  -       Row Name 10/03/24 1124          Pain    Pretreatment Pain Rating 0/10 - no pain  -     Posttreatment Pain Rating 0/10 - no pain  -       Row Name 10/03/24 1124          Bed Mobility    Supine-Sit [place] (Bed Mobility) standby assist  -     Assistive Device (Bed Mobility) head of bed elevated;bed rails  -       Row Name 10/03/24 1124          Sit-Stand Transfer    Sit-Stand [place] (Transfers) contact guard  -       Row Name 10/03/24 1124          Stand-Sit Transfer    Stand-Sit [place] (Transfers) contact guard  -       Row Name 10/03/24 1124          Gait/Stairs (Locomotion)    [place] Level (Gait) verbal cues;contact guard;minimum assist (75% patient effort)  -      Distance in Feet (Gait) 3  -MF     Deviations/Abnormal Patterns (Gait) hailey decreased;stride length decreased  -       Row Name 10/03/24 1124          Motor Skills    Comments, Therapeutic Exercise B LE AROM x 15 reps in chair with rest breaks  -       Row Name 10/03/24 1124          Plan of Care Review    Plan of Care Reviewed With patient  -     Progress no change  -     Outcome Evaluation PT tx completed. Pt. states he feels about the same as yesterday. With any activity, he becomes SOA and demonstrates labored breathing. Increased his O2 from 7l-10L while pt performed bed mobility and his O2 sat still dropped to 85%. Increased to 12L and after several minutes, O2 increased to 93%. Pt. stood and walked 3' to chair. After resting several minutes, pt actively participated with LE exercises in chair with rest breaks. O2 sat maintained 93-95% while on 12L. Pt. left in chair for lunch. Will continue to work with pt on mobility as tolerated.  -       Row Name 10/03/24 1124          Vital Signs    Pre SpO2 (%) 90  -MF     O2 Delivery Pre Treatment hi-flow  7l  -MF     Intra SpO2 (%) 85  -MF     O2 Delivery Intra Treatment hi-flow  10l-increased O2 to 12L  -MF     Post SpO2 (%) 95  -MF     O2 Delivery Post Treatment hi-flow  7.5l  -       Row Name 10/03/24 1124          Positioning and Restraints    Pre-Treatment Position in bed  -MF     Post Treatment Position chair  -MF     In Chair reclined;call light within reach;encouraged to call for assist  -               User Key  (r) = Recorded By, (t) = Taken By, (c) = Cosigned By      Initials Name Provider Type    Lucrecia Roblero PTA Physical Therapist Assistant                    Physical Therapy Education       Title: PT OT SLP Therapies (In Progress)       Topic: Physical Therapy (In Progress)       Point: Mobility training (Done)       Learning Progress Summary             Patient Acceptance, E, VU,NR by LEIDY at 10/1/2024 1102    Comment: benefits  of activity, progression of PT    Acceptance, E,TB, VU by  at 9/25/2024 0755    Acceptance, E,TB, VU by  at 9/24/2024 0745    Acceptance, E, VU,DU,NR by LEIDY at 9/20/2024 1400    Comment: benefits of activity, progression of PT POC                         Point: Home exercise program (Not Started)       Learner Progress:  Not documented in this visit.              Point: Body mechanics (Not Started)       Learner Progress:  Not documented in this visit.              Point: Precautions (Not Started)       Learner Progress:  Not documented in this visit.                              User Key       Initials Effective Dates Name Provider Type Discipline    LEIDY 02/03/23 -  Cristian Hirsch PT DPT Physical Therapist PT     06/19/24 -  Rosa Aiken RN Registered Nurse Nurse                  PT Recommendation and Plan     Plan of Care Reviewed With: patient  Progress: no change  Outcome Evaluation: PT tx completed. Pt. states he feels about the same as yesterday. With any activity, he becomes SOA and demonstrates labored breathing. Increased his O2 from 7l-10L while pt performed bed mobility and his O2 sat still dropped to 85%. Increased to 12L and after several minutes, O2 increased to 93%. Pt. stood and walked 3' to chair. After resting several minutes, pt actively participated with LE exercises in chair with rest breaks. O2 sat maintained 93-95% while on 12L. Pt. left in chair for lunch. Will continue to work with pt on mobility as tolerated.   Outcome Measures       Row Name 10/03/24 1124 10/02/24 1235          How much help from another person do you currently need...    Turning from your back to your side while in flat bed without using bedrails? 3  -MF 3  -MF     Moving from lying on back to sitting on the side of a flat bed without bedrails? 3  -MF 3  -MF     Moving to and from a bed to a chair (including a wheelchair)? 3  -MF 3  -MF     Standing up from a chair using your arms (e.g., wheelchair, bedside  chair)? 3  -MF 3  -MF     Climbing 3-5 steps with a railing? 2  -MF 2  -MF     To walk in hospital room? 3  -MF 3  -MF     AM-PAC 6 Clicks Score (PT) 17  -MF 17  -MF     Highest Level of Mobility Goal 5 --> Static standing  -MF 5 --> Static standing  -MF        Functional Assessment    Outcome Measure Options AM-PAC 6 Clicks Basic Mobility (PT)  -MF AM-PAC 6 Clicks Basic Mobility (PT)  -MF               User Key  (r) = Recorded By, (t) = Taken By, (c) = Cosigned By      Initials Name Provider Type    Lucrecia Roblero PTA Physical Therapist Assistant                     Time Calculation:    PT Charges       Row Name 10/03/24 1204             Time Calculation    Start Time 1124  -MF      Stop Time 1202  -MF      Time Calculation (min) 38 min  -MF      PT Received On 10/03/24  -MF         Time Calculation- PT    Total Timed Code Minutes- PT 38 minute(s)  -MF         Timed Charges    04933 - PT Therapeutic Exercise Minutes 15  -MF      48809 - PT Therapeutic Activity Minutes 23  -MF         Total Minutes    Timed Charges Total Minutes 38  -MF       Total Minutes 38  -MF                User Key  (r) = Recorded By, (t) = Taken By, (c) = Cosigned By      Initials Name Provider Type    Lucrecia Roblero PTA Physical Therapist Assistant                  Therapy Charges for Today       Code Description Service Date Service Provider Modifiers Qty    73829878791 HC GAIT TRAINING EA 15 MIN 10/2/2024 Lucrecia Mello, PTA GP 1    52274189445 HC PT THERAPEUTIC ACT EA 15 MIN 10/2/2024 Lucrecia Mello, PTA GP 2    75376768095 HC PT THER PROC EA 15 MIN 10/3/2024 Lucrecia Mello, PTA GP 1    46889548627 HC PT THERAPEUTIC ACT EA 15 MIN 10/3/2024 Lucrecia Mello, PTA GP 2            PT G-Codes  Outcome Measure Options: AM-PAC 6 Clicks Basic Mobility (PT)  AM-PAC 6 Clicks Score (PT): 17    Lucrecia Mello PTA  10/3/2024

## 2024-10-04 ENCOUNTER — TELEPHONE (OUTPATIENT)
Dept: PULMONOLOGY | Facility: CLINIC | Age: 84
End: 2024-10-04
Payer: MEDICARE

## 2024-10-04 PROBLEM — J96.00 ACUTE RESPIRATORY FAILURE: Status: ACTIVE | Noted: 2024-10-04

## 2024-10-04 PROCEDURE — 97110 THERAPEUTIC EXERCISES: CPT

## 2024-10-04 PROCEDURE — 94799 UNLISTED PULMONARY SVC/PX: CPT

## 2024-10-04 PROCEDURE — 99232 SBSQ HOSP IP/OBS MODERATE 35: CPT | Performed by: INTERNAL MEDICINE

## 2024-10-04 PROCEDURE — 63710000001 PREDNISONE PER 1 MG: Performed by: INTERNAL MEDICINE

## 2024-10-04 PROCEDURE — 94664 DEMO&/EVAL PT USE INHALER: CPT

## 2024-10-04 PROCEDURE — 94761 N-INVAS EAR/PLS OXIMETRY MLT: CPT

## 2024-10-04 PROCEDURE — 99232 SBSQ HOSP IP/OBS MODERATE 35: CPT | Performed by: CLINICAL NURSE SPECIALIST

## 2024-10-04 PROCEDURE — 97530 THERAPEUTIC ACTIVITIES: CPT

## 2024-10-04 RX ORDER — BISACODYL 10 MG
10 SUPPOSITORY, RECTAL RECTAL DAILY PRN
Status: DISCONTINUED | OUTPATIENT
Start: 2024-10-04 | End: 2024-10-07 | Stop reason: HOSPADM

## 2024-10-04 RX ORDER — PREDNISONE 10 MG/1
TABLET ORAL
Qty: 31 TABLET | Refills: 0 | Status: SHIPPED | OUTPATIENT
Start: 2024-10-04 | End: 2024-10-07 | Stop reason: HOSPADM

## 2024-10-04 RX ORDER — BUDESONIDE, GLYCOPYRROLATE, AND FORMOTEROL FUMARATE 160; 9; 4.8 UG/1; UG/1; UG/1
2 AEROSOL, METERED RESPIRATORY (INHALATION) 2 TIMES DAILY
Qty: 10.7 G | Refills: 11 | Status: ON HOLD | OUTPATIENT
Start: 2024-10-04

## 2024-10-04 RX ORDER — HYDROCORTISONE 10 MG/G
CREAM TOPICAL DAILY
Status: DISCONTINUED | OUTPATIENT
Start: 2024-10-04 | End: 2024-10-04

## 2024-10-04 RX ORDER — POLYETHYLENE GLYCOL 3350 17 G/17G
17 POWDER, FOR SOLUTION ORAL DAILY PRN
Status: DISCONTINUED | OUTPATIENT
Start: 2024-10-04 | End: 2024-10-07 | Stop reason: HOSPADM

## 2024-10-04 RX ORDER — ALBUTEROL SULFATE 0.83 MG/ML
2.5 SOLUTION RESPIRATORY (INHALATION) 4 TIMES DAILY PRN
Qty: 336 ML | Refills: 5 | Status: SHIPPED | OUTPATIENT
Start: 2024-10-04 | End: 2024-10-04

## 2024-10-04 RX ORDER — AMOXICILLIN 250 MG
2 CAPSULE ORAL 2 TIMES DAILY
Status: DISCONTINUED | OUTPATIENT
Start: 2024-10-04 | End: 2024-10-07 | Stop reason: HOSPADM

## 2024-10-04 RX ORDER — ALBUTEROL SULFATE 0.83 MG/ML
2.5 SOLUTION RESPIRATORY (INHALATION) 4 TIMES DAILY PRN
Qty: 300 ML | Refills: 5 | Status: ON HOLD | OUTPATIENT
Start: 2024-10-04

## 2024-10-04 RX ORDER — BISACODYL 5 MG/1
5 TABLET, DELAYED RELEASE ORAL DAILY PRN
Status: DISCONTINUED | OUTPATIENT
Start: 2024-10-04 | End: 2024-10-07 | Stop reason: HOSPADM

## 2024-10-04 RX ADMIN — SODIUM BICARBONATE 650 MG: 650 TABLET ORAL at 15:33

## 2024-10-04 RX ADMIN — ASPIRIN 81 MG: 81 TABLET, COATED ORAL at 08:32

## 2024-10-04 RX ADMIN — PANTOPRAZOLE SODIUM 40 MG: 40 TABLET, DELAYED RELEASE ORAL at 08:33

## 2024-10-04 RX ADMIN — HYDROCODONE BITARTRATE AND ACETAMINOPHEN 1 TABLET: 5; 325 TABLET ORAL at 15:33

## 2024-10-04 RX ADMIN — TAMSULOSIN HYDROCHLORIDE 0.4 MG: 0.4 CAPSULE ORAL at 22:52

## 2024-10-04 RX ADMIN — IPRATROPIUM BROMIDE AND ALBUTEROL SULFATE 3 ML: .5; 3 SOLUTION RESPIRATORY (INHALATION) at 06:54

## 2024-10-04 RX ADMIN — ISOSORBIDE MONONITRATE 30 MG: 30 TABLET, EXTENDED RELEASE ORAL at 11:10

## 2024-10-04 RX ADMIN — EMPAGLIFLOZIN 10 MG: 10 TABLET, FILM COATED ORAL at 08:32

## 2024-10-04 RX ADMIN — BUSPIRONE HYDROCHLORIDE 5 MG: 5 TABLET ORAL at 11:10

## 2024-10-04 RX ADMIN — ACETAMINOPHEN 1000 MG: 500 TABLET, FILM COATED ORAL at 22:52

## 2024-10-04 RX ADMIN — SALINE NASAL SPRAY 2 SPRAY: 1.5 SOLUTION NASAL at 14:39

## 2024-10-04 RX ADMIN — METOPROLOL SUCCINATE 25 MG: 25 TABLET, EXTENDED RELEASE ORAL at 08:33

## 2024-10-04 RX ADMIN — IPRATROPIUM BROMIDE AND ALBUTEROL SULFATE 3 ML: .5; 3 SOLUTION RESPIRATORY (INHALATION) at 14:38

## 2024-10-04 RX ADMIN — IPRATROPIUM BROMIDE AND ALBUTEROL SULFATE 3 ML: .5; 3 SOLUTION RESPIRATORY (INHALATION) at 19:27

## 2024-10-04 RX ADMIN — SALINE NASAL SPRAY 2 SPRAY: 1.5 SOLUTION NASAL at 11:10

## 2024-10-04 RX ADMIN — HYDROCODONE BITARTRATE AND ACETAMINOPHEN 1 TABLET: 5; 325 TABLET ORAL at 08:33

## 2024-10-04 RX ADMIN — PRAMOXINE HYDROCHLORIDE HYDROCORTISONE ACETATE 1 APPLICATION: 100; 100 AEROSOL, FOAM TOPICAL at 16:44

## 2024-10-04 RX ADMIN — FLUTICASONE PROPIONATE 2 SPRAY: 50 SPRAY, METERED NASAL at 08:33

## 2024-10-04 RX ADMIN — CETIRIZINE HYDROCHLORIDE 10 MG: 10 TABLET ORAL at 08:33

## 2024-10-04 RX ADMIN — ATORVASTATIN CALCIUM 20 MG: 10 TABLET, FILM COATED ORAL at 22:52

## 2024-10-04 RX ADMIN — IPRATROPIUM BROMIDE AND ALBUTEROL SULFATE 3 ML: .5; 3 SOLUTION RESPIRATORY (INHALATION) at 10:45

## 2024-10-04 RX ADMIN — BUDESONIDE AND FORMOTEROL FUMARATE DIHYDRATE 2 PUFF: 160; 4.5 AEROSOL RESPIRATORY (INHALATION) at 19:27

## 2024-10-04 RX ADMIN — BUSPIRONE HYDROCHLORIDE 5 MG: 5 TABLET ORAL at 17:29

## 2024-10-04 RX ADMIN — SALINE NASAL SPRAY 2 SPRAY: 1.5 SOLUTION NASAL at 07:53

## 2024-10-04 RX ADMIN — GUAIFENESIN 600 MG: 600 TABLET, EXTENDED RELEASE ORAL at 08:33

## 2024-10-04 RX ADMIN — BUSPIRONE HYDROCHLORIDE 5 MG: 5 TABLET ORAL at 08:32

## 2024-10-04 RX ADMIN — GUAIFENESIN 600 MG: 600 TABLET, EXTENDED RELEASE ORAL at 22:52

## 2024-10-04 RX ADMIN — SODIUM BICARBONATE 650 MG: 650 TABLET ORAL at 08:32

## 2024-10-04 RX ADMIN — Medication 10 ML: at 08:32

## 2024-10-04 RX ADMIN — SODIUM BICARBONATE 650 MG: 650 TABLET ORAL at 22:52

## 2024-10-04 RX ADMIN — SALINE NASAL SPRAY 2 SPRAY: 1.5 SOLUTION NASAL at 17:30

## 2024-10-04 RX ADMIN — PREDNISONE 40 MG: 20 TABLET ORAL at 08:32

## 2024-10-04 RX ADMIN — ZOLPIDEM TARTRATE 2.5 MG: 5 TABLET ORAL at 22:52

## 2024-10-04 RX ADMIN — DOCUSATE SODIUM 50 MG AND SENNOSIDES 8.6 MG 2 TABLET: 8.6; 5 TABLET, FILM COATED ORAL at 10:22

## 2024-10-04 RX ADMIN — SALINE NASAL SPRAY 2 SPRAY: 1.5 SOLUTION NASAL at 22:55

## 2024-10-04 RX ADMIN — DOCUSATE SODIUM 50 MG AND SENNOSIDES 8.6 MG 2 TABLET: 8.6; 5 TABLET, FILM COATED ORAL at 22:52

## 2024-10-04 RX ADMIN — HYDROCODONE BITARTRATE AND ACETAMINOPHEN 1 TABLET: 5; 325 TABLET ORAL at 22:52

## 2024-10-04 RX ADMIN — BUDESONIDE AND FORMOTEROL FUMARATE DIHYDRATE 2 PUFF: 160; 4.5 AEROSOL RESPIRATORY (INHALATION) at 06:54

## 2024-10-04 NOTE — PLAN OF CARE
Goal Outcome Evaluation:  Plan of Care Reviewed With: patient        Progress: no change     Pt alert and oriented. Worked with PT/OT today Increased oxygen demand with increased activity. Pt had a moderate BM today that required digital disimpaction. Hemorrhoids noted and physician ordered cream. Meds given per MAR. Possible dc plan to LTACH next week. Safety measures in place.

## 2024-10-04 NOTE — PROGRESS NOTES
[facility] Medicine Services  INPATIENT PROGRESS NOTE    Patient Name: Karoline Prater  Date of Admission: 9/19/2024  Today's Date: 10/04/24  Length of Stay: 14  Primary Care Physician: Irving Alexis MD    Subjective   Chief Complaint: Shortness of breath  HPI   10/1 Patient dyspneic at rest continues to require 7 to 10 L nasal cannula.  He has had epistaxis that is now resolved.  10/02 Patient has ambulated with pulse oximetry and had significant hypoxemia requiring 15 L oxygen nasal cannula to recover.  Although he does appear at baseline.  10/03 No new complaints.  Nasal congestion is improving no further epistaxis.  Trying to discuss poor prognosis patient seems evasive.  Will ask for a palliative care consult.    Today:  Patient resting comfortably.  States his breathing is feeling better but he is still having high flow oxygen requirements.  Has requested to follow-up with his oncologist.  Moved bowels with formed stools and a little bit of rectal irritation.    Review of Systems   All pertinent negatives and positives are as above. All other systems have been reviewed and are negative unless otherwise stated.     Objective    Temp:  [97.4 °F (36.3 °C)-98.2 °F (36.8 °C)] 97.6 °F (36.4 °C)  Heart Rate:  [53-89] 88  Resp:  [16-18] 16  BP: (113-133)/(55-70) 133/64  Physical Exam  Constitutional:       General: He is not in acute distress.     Appearance: He is not ill-appearing.   HENT:      Head: Normocephalic.      Nose:      Comments: No active epistaxis, clot on both nosetrils      Mouth/Throat:      Mouth: Mucous membranes are moist.   Eyes:      Pupils: Pupils are equal, round, and reactive to light.   Cardiovascular:      Rate and Rhythm: Normal rate.   Pulmonary:      Effort: Pulmonary effort is normal.      Comments: Scattered coarse, crackly BSs on 7LNC during my assessment with 02 sats 92-93% - he had just returned to bed shortly before my  "arrival  Musculoskeletal:         General: No deformity.   Skin:     General: Skin is warm.   Neurological:      Mental Status: He is alert.      Motor: Weakness present.   Psychiatric:         Mood and Affect: Mood normal.     Results Review:  I have reviewed the labs, radiology results, and diagnostic studies.    Laboratory Data:   Results from last 7 days   Lab Units 10/01/24  0435 09/29/24  0400   WBC 10*3/mm3 7.31 7.00   HEMOGLOBIN g/dL 11.1* 10.9*   HEMATOCRIT % 37.3* 37.1*   PLATELETS 10*3/mm3 182 159        Results from last 7 days   Lab Units 10/01/24  0339 09/29/24  0400 09/28/24  0221   SODIUM mmol/L 136 140 141   POTASSIUM mmol/L 4.8 4.2 4.3   CHLORIDE mmol/L 106 103 106   CO2 mmol/L 18.0* 25.0 26.0   BUN mg/dL 37* 42* 45*   CREATININE mg/dL 1.59* 2.09* 2.01*   CALCIUM mg/dL 10.0 10.2 9.9   GLUCOSE mg/dL 130* 103* 119*       Culture Data:   No results found for: \"BLOODCX\", \"URINECX\", \"WOUNDCX\", \"MRSACX\", \"RESPCX\", \"STOOLCX\"    Radiology Data:   Imaging Results (Last 24 Hours)       ** No results found for the last 24 hours. **            I have reviewed the patient's current medications.     Assessment/Plan   Assessment  Active Hospital Problems    Diagnosis     **Acute on chronic respiratory failure with hypoxia     Acute respiratory failure     Epistaxis     Chronic rhinitis     Acute-on-chronic respiratory failure     Pneumonitis     Bacterial pneumonia     COVID-19 virus infection     Chronic heart failure with preserved ejection fraction (HFpEF)     COPD (chronic obstructive pulmonary disease)     A-fib     Pulmonary fibrosis     Stage 3b chronic kidney disease     Malignant neoplasm of upper lobe of right lung     Essential hypertension        Treatment Plan  Vitals every 4 hours  Cardiac diet  IV fluids saline lock  Up ad shae.    COPD with acute on chronic respiratory failure  Continue oxygen 7 L nasal cannula with humidifier  Chest x-ray repeat noted  Pulmonology following  \"No carcinoma the " "right upper lobe of the lung with metastasis \"  Oncology follow-up Dr. 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    He is At risk of readmissions as he has had frequent hospitalizations this year, recommended palliative care but patient does not seem amenable to consider other     Medical Decision Making  Number and Complexity of problems: 4 complex problems        Conditions and Status        Condition is unchanged.     [facility] Data  External documents reviewed: none  Cardiac tracing (EKG, telemetry) interpretation: no new EKGs this AM  Radiology interpretation: no new radiology studies; chest x-ray 9/30/2024   Labs reviewed: as above  Any tests that were considered but not ordered: none     Decision rules/scores evaluated (example [id number], Wells, etc): none     Discussed with: patient     Care Planning  Shared decision making: Discussed with patient with agreement to proceed with treatment plan as outlined  Code status and discussions: Full code     Disposition  Social Determinants of Health that impact treatment or disposition: None apparent at this time; patient does live at home alone  I expect the patient to be discharged to home with home health (possibly) when 02 " requirement has improved (still requiring 8-10LNC with even minimal activity)    Electronically signed by Wilber Carbajal MD, 10/04/24, 15:46 CDT.

## 2024-10-04 NOTE — PLAN OF CARE
Goal Outcome Evaluation:  Plan of Care Reviewed With: patient        Progress: improving  Outcome Evaluation: PT tx completed. Pt in bed. Resting on 6L hiflow. Increased to 8L prior to any mobility. Education provided on pursed lip breathing while at rest. SBA for supine to sit. Required several min of sitting d/t dyspnea. O2 sats decreased to 79%, ultimately increased to 10L in order for pt to recover. CGA for steps to the BSC. Pt sat for several min and was unable to have BM. After extesnsive rest break, pt able to return to the bed. Once back in supine, several min to decrease O2, only able to get pt to 7L hiflow. Nsg present. Will cont to follow.      Anticipated Discharge Disposition (PT): home with 24/7 care, home with home health

## 2024-10-04 NOTE — PROGRESS NOTES
"     Inpatient Progress Note        Results Review:   Results from last 7 days   Lab Units 10/01/24  0339 24  0400 24  0221   SODIUM mmol/L 136 140 141   POTASSIUM mmol/L 4.8 4.2 4.3   CHLORIDE mmol/L 106 103 106   CO2 mmol/L 18.0* 25.0 26.0   BUN mg/dL 37* 42* 45*   CALCIUM mg/dL 10.0 10.2 9.9     Results from last 7 days   Lab Units 10/01/24  0435 24  0400   WBC 10*3/mm3 7.31 7.00   HEMOGLOBIN g/dL 11.1* 10.9*   HEMATOCRIT % 37.3* 37.1*   PLATELETS 10*3/mm3 182 159       Visit Vitals  /58 (BP Location: Left arm, Patient Position: Lying)   Pulse 54   Temp 98.2 °F (36.8 °C) (Oral)   Resp 18   Ht 162.6 cm (64\")   Wt 70.1 kg (154 lb 9.6 oz)   SpO2 90%   BMI 26.54 kg/m²            Medications:   acetaminophen, 1,000 mg, Oral, Nightly  aspirin, 81 mg, Oral, Daily  atorvastatin, 20 mg, Oral, Nightly  budesonide-formoterol, 2 puff, Inhalation, BID - RT  busPIRone, 5 mg, Oral, TID With Meals  cetirizine, 10 mg, Oral, Daily  empagliflozin, 10 mg, Oral, Daily  fluticasone, 2 spray, Each Nare, Daily  guaiFENesin, 600 mg, Oral, Q12H  HYDROcodone-acetaminophen, 1 tablet, Oral, TID  ipratropium-albuterol, 3 mL, Nebulization, 4x Daily - RT  isosorbide mononitrate, 30 mg, Oral, Daily Before Lunch  melatonin, 10 mg, Oral, Nightly  metoprolol succinate XL, 25 mg, Oral, Daily  pantoprazole, 40 mg, Oral, Daily  sodium bicarbonate, 650 mg, Oral, TID  sodium chloride, 10 mL, Intravenous, Q12H  sodium chloride, 2 spray, Each Nare, 5x Daily  tamsulosin, 0.4 mg, Oral, Nightly      sodium chloride, 2 spray                                             NAME: Karoline Prater  MRN: [id number]  AGE: 84 y.o.            : 1940  ADMISSION DATE: 2024  PRIMARY CARE PROVIDER: Irving Alexis MD  ATTENDING PHYSICIAN: Wilber Carbajal,*                       Reason for Consult:  Acute on chronic hypoxic respiratory failure, severe COPD    Interval History:    No acute events overnight, patient " resting in bed on 7 L with a sat in the low 90s.  He does have mild increased work of breathing.  States he is dyspneic with minimal exertion.  Expressed concern today that he would not be able to go home with his current respiratory status.    Review of Systems:  A full 12-point review of systems was performed and was negative except as documented in the HPI.                       Physical Exam:  General: No acute distress, cooperative  Head: Atraumatic, normocephalic, normal inspection  Eyes: EOMI, normal inspection  ENT: Mucous membranes moist, no thrush  Heart: RRR, no murmur  Lungs: Severely diminished with coarse breath sounds  MSK: No edema or clubbing  GI/abdominal: Soft, nontender  Neurologic: Alert, oriented.  Cranial nerves II through XII grossly intact.           Imaging Review:   I personally reviewed the chest CTA completed today:  CTA negative for PE.  Redemonstrated his known right-sided lung cancer.  He has worsening opacities and reticulations throughout both lungs concerning for lymphangitic spread of his cancer.                       Problem List:  Acute on chronic hypoxic respiratory failure  Right upper lobe adenocarcinoma  Recent COVID-19 pneumonia  Interstitial lung disease           Recommendations:   -CTA obtained today to rule out PE given his high risk, negative for PE but did show increased opacities and reticulations concerning for lymphangitic spread of his known cancer  -Continue supplemental O2 as needed and wean as tolerated, goal O2 sat of 88 and above  -Continue humidifier with his oxygen  -Will continue current inhaled therapy  -Will start a steroid taper, can be continued on discharge  -Frequent incentive spirometer    Given his apparent lymphangitic spread on CTA, agree with palliative care evaluation.    Thank you for allowing us to participate in this patient's care. We will continue to follow.             Cornelio Gordon DO  Pulmonary/Critical Care  10/3/2024  21:09 CDT

## 2024-10-04 NOTE — CASE MANAGEMENT/SOCIAL WORK
Continued Stay Note  [facility]     Patient Name: Karoline Prater  MRN: [id number]  Today's Date: 10/4/2024    Admit Date: 9/19/2024    Plan: LTAC   Discharge Plan       Row Name 10/04/24 1400       Plan    Plan LTAC    Patient/Family in Agreement with Plan yes    Plan Comments [facility] cannot accept pt due to 02 requirements.  [facility] has accepted pt.  They can accept on Monday; pt has accepted LTAC bed offer.    Final Discharge Disposition Code 63 - LTCH                   Discharge Codes    No documentation.                 Expected Discharge Date and Time       Expected Discharge Date Expected Discharge Time    Oct 3, 2024               ANDI Yeung

## 2024-10-04 NOTE — PROGRESS NOTES
[facility] Palliative Care Services    Palliative Care Daily Progress Note   Chief complaint-follow up support for patient/family and pain/symptom management    Code Status:   Code Status and Medical Interventions: No CPR (Do Not Attempt to Resuscitate); Limited Support; No intubation (DNI), Other; No permanent feeding tube   Ordered at: 10/03/24 1447     Medical Intervention Limits:    No intubation (DNI)    Other     Level Of Support Discussed With:    Patient     Code Status (Patient has no pulse and is not breathing):    No CPR (Do Not Attempt to Resuscitate)     Medical Interventions (Patient has pulse or is breathing):    Limited Support     Additional Medical Interventions Limits:    No permanent feeding tube      Advanced Directives: Advance Directive Status: Patient does not have advance directive   Goals of Care: Ongoing.     S: Medical record reviewed. Events noted.  A repeat CT completed 10/3 shows similar masslike spiculated opacity at the right upper lobe.  Similar intralobular septal thickening with increased patchy opacities especially in the right lung.  Appearance highly concerning for malignancy with lymphangitic spread of tumor.  Similar mild adenopathy.  Coronary artery calcifications with metallic device in the right ventricular apex, favor wireless pulse generator.  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  Pulmonology started a steroids taper 10/3.  Laying in bed without apparent distress.  Continues to have accessory muscle usage at rest more pronounced with conversation but improved compared to yesterday.  On 8 L high flow upon initial assessment and decrease this to 6 L and monitored for several minutes with O2 saturations remaining 92 to 93%.  States that he slept well overnight and hoping to have a bowel movement today as it has been 5 days.  Reviewed CT findings from 10/3 as above concerning for lymphangitic spread.  Discussed pulmonology  "plans for steroid taper, states \"I hope not I can't sleep when I am on those steroids\". We again discussed that he was able to sleep well last night and patient verbalizes confirmation of this. Patient requests for Dr. Michele, oncology to to follow for continuity of care passed along to attending physician.  Anticipating discharge to SNF.  We previously discussed best supportive care directed by hospice 10/3.     Review of Systems   Constitutional: Positive for malaise/fatigue.   Cardiovascular:  Positive for leg swelling.   Respiratory:  Positive for shortness of breath.    Musculoskeletal:  Positive for muscle weakness.   Genitourinary:  Negative for dysuria.   Neurological:  Positive for weakness.   Psychiatric/Behavioral:  The patient is nervous/anxious.     Pain Assessment  Preferred Pain Scale: number (Numeric Rating Pain Scale)  Nonverbal Indicators of Pain: nonverbal indicators absent    O:     Intake/Output Summary (Last 24 hours) at 10/4/2024 0825  Last data filed at 10/4/2024 0408  Gross per 24 hour   Intake 720 ml   Output 1125 ml   Net -405 ml       Diagnostics and current medications: Reviewed.      Current Facility-Administered Medications:     acetaminophen (TYLENOL) tablet 650 mg, 650 mg, Oral, Q4H PRN **OR** acetaminophen (TYLENOL) 160 MG/5ML oral solution 650 mg, 650 mg, Oral, Q4H PRN **OR** acetaminophen (TYLENOL) suppository 650 mg, 650 mg, Rectal, Q4H PRN, Wilber Carbajal MD    acetaminophen (TYLENOL) tablet 1,000 mg, 1,000 mg, Oral, Nightly, Wilber Carbajal MD, 1,000 mg at 10/03/24 2120    aspirin EC tablet 81 mg, 81 mg, Oral, Daily, Wilber Carbajal MD, 81 mg at 10/03/24 0813    atorvastatin (LIPITOR) tablet 20 mg, 20 mg, Oral, Nightly, Wilber Carbajal MD, 20 mg at 10/03/24 2125    sennosides-docusate (PERICOLACE) 8.6-50 MG per tablet 2 tablet, 2 tablet, Oral, BID PRN, 2 tablet at 10/02/24 2038 **AND** polyethylene glycol (MIRALAX) packet 17 g, 17 g, Oral, " Daily PRN, 17 g at 10/03/24 0812 **AND** bisacodyl (DULCOLAX) EC tablet 5 mg, 5 mg, Oral, Daily PRN **AND** bisacodyl (DULCOLAX) suppository 10 mg, 10 mg, Rectal, Daily PRN, Wilber Carbajal MD    budesonide-formoterol (SYMBICORT) 160-4.5 MCG/ACT inhaler 2 puff, 2 puff, Inhalation, BID - RT, Wilber Carbajal MD, 2 puff at 10/04/24 0654    busPIRone (BUSPAR) tablet 5 mg, 5 mg, Oral, TID With Meals, Grace Aly APRN, 5 mg at 10/03/24 1744    cetirizine (zyrTEC) tablet 10 mg, 10 mg, Oral, Daily, Tarik Crocker MD, 10 mg at 10/03/24 0813    dextromethorphan polistirex ER (DELSYM) 30 MG/5ML oral suspension 60 mg, 60 mg, Oral, Q12H PRN, Wilber Carbajal MD    diphenhydrAMINE (BENADRYL) capsule 25 mg, 25 mg, Oral, Nightly PRN, Wilber Carbajal MD, 25 mg at 09/22/24 2149    empagliflozin (JARDIANCE) tablet 10 mg, 10 mg, Oral, Daily, Irving Power DO, 10 mg at 10/03/24 0812    fluticasone (FLONASE) 50 MCG/ACT nasal spray 2 spray, 2 spray, Each Nare, Daily, Tarik Crocker MD, 2 spray at 10/03/24 0813    guaiFENesin (MUCINEX) 12 hr tablet 600 mg, 600 mg, Oral, Q12H, Wilber Carbajal MD, 600 mg at 10/03/24 2120    HYDROcodone-acetaminophen (NORCO) 5-325 MG per tablet 1 tablet, 1 tablet, Oral, TID, Grace Aly APRN, 1 tablet at 10/03/24 2120    ipratropium-albuterol (DUO-NEB) nebulizer solution 3 mL, 3 mL, Nebulization, 4x Daily - RT, Nitin, Mariam Lobato, MARITZA, 3 mL at 10/04/24 0654    isosorbide mononitrate (IMDUR) 24 hr tablet 30 mg, 30 mg, Oral, Daily Before Lunch, Wilber Carbajal MD, 30 mg at 10/03/24 1210    melatonin tablet 10 mg, 10 mg, Oral, Nightly, Wilber Carbajal MD, 10 mg at 10/02/24 2039    metoprolol succinate XL (TOPROL-XL) 24 hr tablet 25 mg, 25 mg, Oral, Daily, Wilber Carbajal MD, 25 mg at 10/03/24 0813    nitroglycerin (NITROSTAT) SL tablet 0.4 mg, 0.4 mg, Sublingual, Q5 Min PRN, Wilber Carbajal MD     "pantoprazole (PROTONIX) EC tablet 40 mg, 40 mg, Oral, Daily, Wilber Carbajal MD, 40 mg at 10/03/24 0813    predniSONE (DELTASONE) tablet 40 mg, 40 mg, Oral, Daily With Breakfast, Cornelio Gordon,     sodium bicarbonate tablet 650 mg, 650 mg, Oral, TID, Wilber Carbajal MD, 650 mg at 10/03/24 2120    sodium chloride 0.9 % flush 10 mL, 10 mL, Intravenous, Q12H, Wilber Carbajal MD, 10 mL at 10/03/24 2121    sodium chloride 0.9 % flush 10 mL, 10 mL, Intravenous, PRN, Wilber Carbajal MD, 10 mL at 09/23/24 0632    sodium chloride 0.9 % infusion 40 mL, 40 mL, Intravenous, PRN, Wilber Carbajal MD    sodium chloride nasal spray 2 spray, 2 spray, Each Nare, 5x Daily, Vijay James Jr., MD, 2 spray at 10/04/24 0753    sodium chloride nasal spray 2 spray, 2 spray, Each Nare, Continuous PRN, Vijay James Jr., MD    tamsulosin (FLOMAX) 24 hr capsule 0.4 mg, 0.4 mg, Oral, Nightly, Wilber Carbajal MD, 0.4 mg at 10/03/24 2120    traZODone (DESYREL) tablet 25 mg, 25 mg, Oral, Nightly PRN, Tarik Crocker MD, 25 mg at 09/25/24 2043    zolpidem (AMBIEN) tablet 2.5 mg, 2.5 mg, Oral, Nightly PRN, Billy Lebron MD, 2.5 mg at 10/03/24 2120    Allergies   Allergen Reactions    Penicillins Hives     I have utilized all available immediate resources to obtain, update, or review the patient's current medications (including all prescriptions, over-the-counter products, herbals, cannabis/cannabidiol products, and vitamin/mineral/dietary (nutritional) supplements) for name, route of administration, type, dose and frequency.    A:    /68 (BP Location: Left arm)   Pulse 88   Temp 97.4 °F (36.3 °C) (Oral)   Resp 16   Ht 162.6 cm (64\")   Wt 70.9 kg (156 lb 3.2 oz)   SpO2 91%   BMI 26.81 kg/m²     Vitals and nursing note reviewed.   Constitutional:       Appearance: Frail. Ill-appearing and acutely ill-appearing.      Comments: High flow oxygen.  Was " on 8 L and this was decreased to 6 L and monitored for several minutes during conversation of which patient remained with O2 saturation 92% to 93%.    Eyes:      Comments: Left eye blindness/opacity   HENT:      Head: Normocephalic.   Pulmonary:      Effort: Increased respiratory effort. Accessory muscle usage present.      Breath sounds: Decreased breath sounds present.   Cardiovascular:      Normal rate.   Edema:     Peripheral edema present.  Abdominal:      Palpations: Abdomen is soft.   Musculoskeletal: Normal range of motion.      Cervical back: Neck supple.   Skin:     General: Skin is warm.   Genitourinary:     Comments: No reported dysuria  Neurological:      Mental Status: Alert, oriented to person, place, and time and oriented to person, place and time.   Psychiatric:         Mood and Affect: Mood is anxious.         Cognition and Memory: Cognition normal.      Patient status: Disease state: Deteriorating despite treatments.  Current Functional status: Palliative Performance Scale Score: Performance 40% based on the following measures: Ambulation: Mainly in bed, Activity and Evidence of Disease: Unable to do any work, extensive evidence of disease, Self-Care: Mainly assistance required,  Intake: Normal or reduced, LOC: Full, drowsy or confusion   Baseline Functional status: Palliative Performance Scale Score: Performance 40% based on the following measures: Ambulation: Mainly in bed, Activity and Evidence of Disease: Unable to do any work, extensive evidence of disease, Self-Care: Mainly assistance required,  Intake: Normal or reduced, LOC: Full, drowsy or confusion   Baseline ECOG Status(4) Completely disabled, unable to carry out self-care.  Totally confined to bed or chair.  Nutritional status: Albumin 2.8 Body mass index is 26.54 kg/m².      Hospital Problem List      Acute on chronic respiratory failure with hypoxia    Essential hypertension    Malignant neoplasm of upper lobe of right lung    Stage  3b chronic kidney disease    Pulmonary fibrosis    A-fib    COPD (chronic obstructive pulmonary disease)    Chronic heart failure with preserved ejection fraction (HFpEF)    COVID-19 virus infection    Bacterial pneumonia    Pneumonitis    Acute-on-chronic respiratory failure    Epistaxis    Chronic rhinitis     Impression/Problem List:     Stage IV right upper lobe of lung adenocarcinoma 11/27/2018 followed by Dr. Mihcele, PET scan 8/15 shows concerns for recurrent/progression of disease-right upper lobe and subcarinal ymphadenopathy mets?  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 plan: Goals of care include CODE STATUS no CPR/limited support interventions.     Family support: The patient receives support from his  brother ..  Advance Directives: Advance Directive Status: Patient does not have advance directive   POA/Healthcare surrogate-son, Beau and brother-next of kin.     2.  Palliative care encounter  - Prognosis is poor long-term secondary to secondary to stage IV metastatic adenocarcinoma of the lung, COPD exacerbation, acute on chronic respiratory failure, chronic kidney disease stage IV, pulmonary fibrosis, multiple comorbidities, and advanced age.  -Patient appears to have fair prognostic awareness.      -stage IV  metastatic right upper lobe of lung adenocarcinoma to the peripancreatic region diagnosed 11/27/2018 followed by Dr. Michele and completed 4 cycles of combination chemotherapy with carboplatin, Keytruda, and Alimta, maintenance Keytruda and Alimta every 3 weeks with final cycle #25 on 10/29/2020, completed SBRT radiotherapy x 5 fractions for to right lung nodules 2/7/2024, Keytruda on hold due to issues of dyspnea and shortness of breath requiring steroids.  PET scan 12/7/2023 showed progression disease in right upper lobe of the lung and referred to radiation oncology for SBRT.    -8/15-PET scan showed spiculated pleural-based nodule in the medial right lung apex, there is also spiculated nodule in right upper lobe anteriorly that measures 1.3 cm which is suspicious for active neoplasm.  Concerning the pleural-based mass in the right upper lobe this may be related to an area of resolving pneumonia given the interval decrease in size.  Hypermetabolic subcarinal lymphadenopathy is present suspicious for metastatic lymphadenopathy.  Plan for follow-up with Dr. Crocker 9/24 anticipating possible navigational bronchoscopy.    9/3-last seen by Dr. Danny Cool.  PET scan is suspicious for recurrent disease.  Biopsy for confirmation is offered but the patient declines.  At this point he desires supportive care only.  9/18-Last seen by Dr. Michele. Dr. Michele spoke with Dr. Cool who reports Mr. Prater had declined getting a biopsy stating if it was positive his intentions were not to proceed with further systemic therapy, if so desired potential palliative XRT to area of concern.         -Last seen by this provider during August hospitalization at which time CODE STATUS changes implemented to no CPR/limited support interventions, no intubation, and no permanent feeding tube.  A MOST document was completed and currently on file.  At that time we also discussed patient's unfortunate decline in performance status and  "potential need for nursing facility placement, as well as, options of best supportive care directed by hospice.      -Required high flow oxygen and transition to Vapotherm on admission.  Received IV diuresis, DuoNebs, Symbicort, incentive spirometer, and additional adjuvants.    -Pulmonology consulted and recommended IV antibiotics.  Plan for outpatient pulmonology follow-up in 1 month.    -Developed acute kidney injury and diuretics decreased.   -Developed epistaxis and ENT consulted recommends nasal hygiene and felt symptoms due to chronic rhinitis.    -Case management has arranged 10 L oxygen concentrator for home.   10/3-Unfortunately required up to 12-15 L over the last 2 days with labored breathing when working with therapy with minimal exertion.  Reports limited to walking only a couple of feet, lives alone, and would be unable to care for himself \"would greatly benefit from further therapy to increase his strength and endurance\".     10/3-CODE STATUS changes no CPR/limited support interventions, no intubation, no permanent feeding tube.  A MOST document was completed 8/5/2024 and current.  -Discussed further de-escalation and best supportive care directed by hospice.  Encouraged ongoing consideration given poor long-term prognosis, progressive decline, and multiple comorbid factors.  -High risk rehospitalizations  -Agreeable to SNF at discharge, prefers [facility].  Would obviously need to continue weaning efforts of oxygen therapy, uncertain of SNF capacity to provide high-level oxygen.    10/4-continue supportive measures  -Reviewed CT findings from 10/3 as above concerning for lymphangitic spread.    -pulmonology plans for steroid tape  -requests for Dr. Michele, oncology to to follow for continuity of care passed along to attending physician electronically.    -Anticipating discharge to SNF.   -discussed best supportive care directed by hospice 10/3.  -A MOST document completed 8/5 and " current to care goals placed in patient's chart     3.  Anxiety  -BuSpar 3 times daily     4.  Dyspnea at rest  -low-dose opiate trial scheduled    5.  Insomnia  -Ambien as needed per attending    6.  Constipation  -Senna-S twice daily and other bowel regimen adjuvants as needed.      Thank you for allowing us to participate in patient's plan of care. Palliative Care Team will continue to follow patient.     Grace Aly, MARITZA  10/4/2024  08:25 CDT

## 2024-10-04 NOTE — THERAPY TREATMENT NOTE
Acute Care - Physical Therapy Treatment Note  [facility]     Patient Name: Karoline Prater  : 1940  MRN: [id number]  Today's Date: 10/4/2024      Visit Dx:     ICD-10-CM ICD-9-CM   1. Hypoxia  R09.02 799.02   2. Pneumonitis  J98.4 486   3. Acute UTI  N39.0 599.0   4. Impaired mobility [Z74.09]  Z74.09 799.89   5. Chronic obstructive pulmonary disease, unspecified COPD type  J44.9 496     Patient Active Problem List   Diagnosis    Dyspnea    Essential hypertension    NSVT (nonsustained ventricular tachycardia)    Malignant neoplasm of upper lobe of right lung    Stage 3b chronic kidney disease    Pancytopenia due to antineoplastic chemotherapy    Pneumonia due to infectious organism    Function kidney decreased    Cellulitis    Acute on chronic respiratory failure with hypoxia    Pulmonary fibrosis    Macrocytic anemia    Thrombocytopenia    Sepsis    Right pneumothorax    Hyperkalemia    Acute kidney injury superimposed on CKD stage 3b    GERD without esophagitis    A-fib    Former smoker    Chest pain    Tachycardia    Bilateral lower extremity edema    Metastatic adenocarcinoma of the RUL    Chronic diastolic congestive heart failure    COPD (chronic obstructive pulmonary disease)    S/P radiation > 12 weeks    CHF (congestive heart failure)    History of radiation therapy    Chronic heart failure with preserved ejection fraction (HFpEF)    Narrow complex tachycardia    Atrial flutter    Encounter for examination for normal comparison and control in clinical research program    CHF exacerbation    COVID-19 virus infection    Cytokine release syndrome, grade 2    Bacterial pneumonia    Hyperlipidemia    Coronary artery disease    Pneumonitis    Acute-on-chronic respiratory failure    Epistaxis    Chronic rhinitis    Acute respiratory failure     Past Medical History:   Diagnosis Date    Arthritis     Atrial fibrillation with rapid ventricular response 2019    Blindness of left eye     BPH with  obstruction/lower urinary tract symptoms     Cancer     stomach & lung    CHF (congestive heart failure)     CKD (chronic kidney disease) stage 3, GFR 30-59 ml/min     COPD with acute exacerbation 08/03/2024    Coronary artery disease     Elevated cholesterol     Essential hypertension 10/02/2017    Fibrotic lung diseases     GERD (gastroesophageal reflux disease)     Hearing loss     History of transfusion     Hydronephrosis of left kidney     Hyperlipidemia     Hypertension     pt was taken off of all of his medications for BP (atenolol, lisinopril, lasix) because his BP kept bottoming out so his primary dr told him to discontinue them 1-2 months ago (Jan/Feb 2019). pt states he takes no medications currently.    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 Patel is just going to keep an eye on it and not do surgery right now.    Mediastinal adenopathy 10/24/2018    Station 7    Monoclonal gammopathy of unknown significance (MGUS) 09/11/2018    Pancreatic mass     pt states he had this in 2013 but it went away on its own. Now recent CT shows it has come back so he is going to have an ultrasound on 3/13/19.    Shortness of breath      Past Surgical History:   Procedure Laterality Date    ABDOMINAL SURGERY      BRONCHOSCOPY N/A 10/24/2018    Procedure: BRONCHOSCOPY WITH BIOPSY, EBUS;  Surgeon: Gareth Becerra MD;  Location: [facility] OR;  Service: Pulmonary    CHOLECYSTECTOMY      COLONOSCOPY N/A 1/3/2019    Procedure: COLONOSCOPY WITH ANESTHESIA;  Surgeon: Randy Somers DO;  Location: [facility] ENDOSCOPY;  Service: Gastroenterology    CYSTOSCOPY, RETROGRADE PYELOGRAM AND STENT INSERTION Left 3/8/2019    Procedure: CYSTOSCOPY RETROGRADE BILATERAL PYELOGRAM;  Surgeon: Jos Sylvester MD;  Location: [facility] OR;  Service: Urology    ENDOSCOPY N/A 12/11/2018     Procedure: ESOPHAGOGASTRODUODENOSCOPY WITH ANESTHESIA;  Surgeon: Randy Somers DO;  Location: [facility] ENDOSCOPY;  Service: Gastroenterology    ENDOSCOPY N/A 4/29/2019    Procedure: ESOPHAGOGASTRODUODENOSCOPY WITH ANESTHESIA;  Surgeon: Lilliam Jj MD;  Location: [facility] OR;  Service: Gastroenterology    ENDOSCOPY N/A 5/9/2019    Procedure: ESOPHAGOGASTRODUODENOSCOPY WITH ANESTHESIA;  Surgeon: Pilo Bansal MD;  Location: [facility] ENDOSCOPY;  Service: Gastroenterology    EYE SURGERY Left 1964    FOOT SURGERY Right 1966    joint    FRACTURE SURGERY      PACEMAKER IMPLANTATION Left 8/29/2024    Procedure: Leadless pacemaker implant and AVN ablation;  Surgeon: Carlitos Bowen MD;  Location: [facility] CATH INVASIVE LOCATION;  Service: Cardiovascular;  Laterality: Left;     PT Assessment (Last 12 Hours)       PT Evaluation and Treatment       Row Name 10/04/24 0932          Physical Therapy Time and Intention    Subjective Information complains of;weakness;fatigue;dyspnea  -LY     Document Type therapy note (daily note)  -LY     Mode of Treatment physical therapy  -LY       Row Name 10/04/24 0932          General Information    Existing Precautions/Restrictions fall;oxygen therapy device and L/min  6-10L hiflow  -LY       Row Name 10/04/24 0932          Pain    Pretreatment Pain Rating 0/10 - no pain  -LY     Posttreatment Pain Rating 0/10 - no pain  -LY       Row Name 10/04/24 0932          Bed Mobility    Supine-Sit [place] (Bed Mobility) standby assist  -LY     Sit-Supine [place] (Bed Mobility) verbal cues;minimum assist (75% patient effort)  -LY     Assistive Device (Bed Mobility) head of bed elevated;bed rails  -LY       Row Name 10/04/24 0932          Sit-Stand Transfer    Sit-Stand [place] (Transfers) contact guard  -LY       Row Name 10/04/24 0932          Stand-Sit Transfer    Stand-Sit [place] (Transfers) contact guard  -LY       Row Name 10/04/24 0932          Gait/Stairs (Locomotion)     Comment, (Gait/Stairs) steps over to the BSC  -LY       Row Name 10/04/24 0932          Motor Skills    Comments, Therapeutic Exercise BLE AROM x10 reps seated  -LY       Row Name 10/04/24 0932          Plan of Care Review    Plan of Care Reviewed With patient  -LY     Progress improving  -LY     Outcome Evaluation PT tx completed. Pt in bed. Resting on 6L hiflow. Increased to 8L prior to any mobility. Education provided on pursed lip breathing while at rest. SBA for supine to sit. Required several min of sitting d/t dyspnea. O2 sats decreased to 79%, ultimately increased to 10L in order for pt to recover. CGA for steps to the BSC. Pt sat for several min and was unable to have BM. After extesnsive rest break, pt able to return to the bed. Once back in supine, several min to decrease O2, only able to get pt to 7L hiflow. Nsg present. Will cont to follow.  -LY       Row Name 10/04/24 0932          Positioning and Restraints    Pre-Treatment Position in bed  -LY     Post Treatment Position bed  -LY     In Bed fowlers;call light within reach;encouraged to call for assist;exit alarm on;with family/caregiver;with nsg  -LY               User Key  (r) = Recorded By, (t) = Taken By, (c) = Cosigned By      Initials Name Provider Type    LY Susanne Lizarraga, PTA Physical Therapist Assistant                    Physical Therapy Education       Title: PT OT SLP Therapies (In Progress)       Topic: Physical Therapy (In Progress)       Point: Mobility training (Done)       Learning Progress Summary             Patient Acceptance, E, VU,NR by LEIDY at 10/1/2024 1102    Comment: benefits of activity, progression of PT    Acceptance, E,TB, VU by STEVEN at 9/25/2024 0755    Acceptance, E,TB, VU by STEVEN at 9/24/2024 0745    Acceptance, E, VU,DU,NR by LEIDY at 9/20/2024 1400    Comment: benefits of activity, progression of PT POC                         Point: Home exercise program (Not Started)       Learner Progress:  Not documented in this visit.               Point: Body mechanics (Not Started)       Learner Progress:  Not documented in this visit.              Point: Precautions (Not Started)       Learner Progress:  Not documented in this visit.                              User Key       Initials Effective Dates Name Provider Type Discipline    LEIDY 02/03/23 -  Cristian Hirsch, PT DPT Physical Therapist PT     06/19/24 -  Rosa Aiken, RN Registered Nurse Nurse                  PT Recommendation and Plan  Anticipated Discharge Disposition (PT): home with 24/7 care, home with home health  Plan of Care Reviewed With: patient  Progress: improving  Outcome Evaluation: PT tx completed. Pt in bed. Resting on 6L hiflow. Increased to 8L prior to any mobility. Education provided on pursed lip breathing while at rest. SBA for supine to sit. Required several min of sitting d/t dyspnea. O2 sats decreased to 79%, ultimately increased to 10L in order for pt to recover. CGA for steps to the BSC. Pt sat for several min and was unable to have BM. After extesnsive rest break, pt able to return to the bed. Once back in supine, several min to decrease O2, only able to get pt to 7L hiflow. Nsg present. Will cont to follow.   Outcome Measures       Row Name 10/03/24 1124 10/02/24 8405          How much help from another person do you currently need...    Turning from your back to your side while in flat bed without using bedrails? 3  -MF 3  -MF     Moving from lying on back to sitting on the side of a flat bed without bedrails? 3  -MF 3  -MF     Moving to and from a bed to a chair (including a wheelchair)? 3  -MF 3  -MF     Standing up from a chair using your arms (e.g., wheelchair, bedside chair)? 3  -MF 3  -MF     Climbing 3-5 steps with a railing? 2  -MF 2  -MF     To walk in hospital room? 3  -MF 3  -MF     AM-PAC 6 Clicks Score (PT) 17  -MF 17  -MF     Highest Level of Mobility Goal 5 --> Static standing  -MF 5 --> Static standing  -MF        Functional Assessment     Outcome Measure Options AM-PAC 6 Clicks Basic Mobility (PT)  - AM-PAC 6 Clicks Basic Mobility (PT)  -               User Key  (r) = Recorded By, (t) = Taken By, (c) = Cosigned By      Initials Name Provider Type    Lucrecia Roblero, SHIMA Physical Therapist Assistant                     Time Calculation:    PT Charges       Row Name 10/04/24 1159             Time Calculation    Start Time 0932  -LY      Stop Time 1025  -LY      Time Calculation (min) 53 min  -LY      PT Received On 10/04/24  -LY         Time Calculation- PT    Total Timed Code Minutes- PT 53 minute(s)  -LY         Timed Charges    17668 - PT Therapeutic Exercise Minutes 15  -LY      26543 - PT Therapeutic Activity Minutes 43  -LY         Total Minutes    Timed Charges Total Minutes 58  -LY       Total Minutes 58  -LY                User Key  (r) = Recorded By, (t) = Taken By, (c) = Cosigned By      Initials Name Provider Type    Susanne Simmons PTA Physical Therapist Assistant                  Therapy Charges for Today       Code Description Service Date Service Provider Modifiers Qty    70922115953 HC PT THER PROC EA 15 MIN 10/4/2024 Susanne Lizarraga PTA GP 1    24887793400 HC PT THERAPEUTIC ACT EA 15 MIN 10/4/2024 Susanne Lizarraga PTA GP 3            PT G-Codes  Outcome Measure Options: AM-PAC 6 Clicks Basic Mobility (PT)  AM-PAC 6 Clicks Score (PT): 17    Susanne Lizarraga PTA  10/4/2024

## 2024-10-04 NOTE — PROGRESS NOTES
"     Inpatient Progress Note        Results Review:   Results from last 7 days   Lab Units 10/01/24  0339 24  0400 24  0221   SODIUM mmol/L 136 140 141   POTASSIUM mmol/L 4.8 4.2 4.3   CHLORIDE mmol/L 106 103 106   CO2 mmol/L 18.0* 25.0 26.0   BUN mg/dL 37* 42* 45*   CALCIUM mg/dL 10.0 10.2 9.9     Results from last 7 days   Lab Units 10/01/24  0435 24  0400   WBC 10*3/mm3 7.31 7.00   HEMOGLOBIN g/dL 11.1* 10.9*   HEMATOCRIT % 37.3* 37.1*   PLATELETS 10*3/mm3 182 159       Visit Vitals  /68 (BP Location: Left arm)   Pulse 88   Temp 97.4 °F (36.3 °C) (Oral)   Resp 16   Ht 162.6 cm (64\")   Wt 70.9 kg (156 lb 3.2 oz)   SpO2 91%   BMI 26.81 kg/m²            Medications:   acetaminophen, 1,000 mg, Oral, Nightly  aspirin, 81 mg, Oral, Daily  atorvastatin, 20 mg, Oral, Nightly  budesonide-formoterol, 2 puff, Inhalation, BID - RT  busPIRone, 5 mg, Oral, TID With Meals  cetirizine, 10 mg, Oral, Daily  empagliflozin, 10 mg, Oral, Daily  fluticasone, 2 spray, Each Nare, Daily  guaiFENesin, 600 mg, Oral, Q12H  HYDROcodone-acetaminophen, 1 tablet, Oral, TID  ipratropium-albuterol, 3 mL, Nebulization, 4x Daily - RT  isosorbide mononitrate, 30 mg, Oral, Daily Before Lunch  melatonin, 10 mg, Oral, Nightly  metoprolol succinate XL, 25 mg, Oral, Daily  pantoprazole, 40 mg, Oral, Daily  predniSONE, 40 mg, Oral, Daily With Breakfast  senna-docusate sodium, 2 tablet, Oral, BID  sodium bicarbonate, 650 mg, Oral, TID  sodium chloride, 10 mL, Intravenous, Q12H  sodium chloride, 2 spray, Each Nare, 5x Daily  tamsulosin, 0.4 mg, Oral, Nightly      sodium chloride, 2 spray                                             NAME: Karoline Prater  MRN: [id number]  AGE: 84 y.o.            : 1940  ADMISSION DATE: 2024  PRIMARY CARE PROVIDER: Irving Alexis MD  ATTENDING PHYSICIAN: Wilber Carbajal,*                       Reason for Consult:  Acute on chronic hypoxic respiratory failure    Interval " History:    No acute events overnight, patient resting in bed breathing comfortably on 8 L with a sat in the mid 90s.  O2 decreased to 7 L.  Continues to have increased work of breathing and dyspnea.    Review of Systems:  A full 12-point review of systems was performed and was negative except as documented in the HPI.                       Physical Exam:  General: No acute distress, cooperative  Head: Atraumatic, normocephalic, normal inspection  Eyes: EOMI, normal inspection  ENT: Mucous membranes moist, no thrush  Teeth/gums: Normal inspection  Heart: RRR, no murmur  Lungs: Diminished with bilateral Coarse breath sounds  MSK: No edema or clubbing  GI/abdominal: Soft, nontender  Neurologic: Alert, oriented.  Cranial nerves II through XII grossly intact.           Imaging Review:   No new imaging for review.                       Problem List:  Acute on chronic hypoxic respiratory failure  Right upper lobe adenocarcinoma  Recent COVID-19 pneumonia  Interstitial lung disease           Recommendations:   -Continue supplemental oxygen as needed wean as tolerated, goal O2 sat of 88-92%  -CTA yesterday concerning for lymphangitic spread of his known malignancy, suspect this is the driving factor to his respiratory failure  -Started him on steroids yesterday with a plan to taper after discharge  -Agree with palliative care evaluation  -Continue to provide her with his oxygen  -Will continue scheduled DuoNeb with Symbicort.  Changed to triple therapy on discharge with Breztri and as needed nebulized albuterol, this has been ordered    Thank you for allowing us to participate in this patient's care. We will continue to follow.             Cornelio Gordon DO  Pulmonary/Critical Care  10/4/2024  10:25 CDT

## 2024-10-05 PROCEDURE — 94799 UNLISTED PULMONARY SVC/PX: CPT

## 2024-10-05 PROCEDURE — 63710000001 PREDNISONE PER 1 MG: Performed by: INTERNAL MEDICINE

## 2024-10-05 PROCEDURE — 94664 DEMO&/EVAL PT USE INHALER: CPT

## 2024-10-05 PROCEDURE — 99232 SBSQ HOSP IP/OBS MODERATE 35: CPT | Performed by: INTERNAL MEDICINE

## 2024-10-05 PROCEDURE — 94761 N-INVAS EAR/PLS OXIMETRY MLT: CPT

## 2024-10-05 RX ADMIN — GUAIFENESIN 600 MG: 600 TABLET, EXTENDED RELEASE ORAL at 09:09

## 2024-10-05 RX ADMIN — IPRATROPIUM BROMIDE AND ALBUTEROL SULFATE 3 ML: .5; 3 SOLUTION RESPIRATORY (INHALATION) at 07:01

## 2024-10-05 RX ADMIN — SODIUM BICARBONATE 650 MG: 650 TABLET ORAL at 15:52

## 2024-10-05 RX ADMIN — SODIUM BICARBONATE 650 MG: 650 TABLET ORAL at 09:10

## 2024-10-05 RX ADMIN — PRAMOXINE HYDROCHLORIDE HYDROCORTISONE ACETATE 1 APPLICATION: 100; 100 AEROSOL, FOAM TOPICAL at 10:22

## 2024-10-05 RX ADMIN — SALINE NASAL SPRAY 2 SPRAY: 1.5 SOLUTION NASAL at 17:24

## 2024-10-05 RX ADMIN — SALINE NASAL SPRAY 2 SPRAY: 1.5 SOLUTION NASAL at 21:16

## 2024-10-05 RX ADMIN — BUSPIRONE HYDROCHLORIDE 5 MG: 5 TABLET ORAL at 09:09

## 2024-10-05 RX ADMIN — GUAIFENESIN 600 MG: 600 TABLET, EXTENDED RELEASE ORAL at 21:15

## 2024-10-05 RX ADMIN — IPRATROPIUM BROMIDE AND ALBUTEROL SULFATE 3 ML: .5; 3 SOLUTION RESPIRATORY (INHALATION) at 10:38

## 2024-10-05 RX ADMIN — TAMSULOSIN HYDROCHLORIDE 0.4 MG: 0.4 CAPSULE ORAL at 21:16

## 2024-10-05 RX ADMIN — ACETAMINOPHEN 1000 MG: 500 TABLET, FILM COATED ORAL at 21:15

## 2024-10-05 RX ADMIN — SODIUM BICARBONATE 650 MG: 650 TABLET ORAL at 21:15

## 2024-10-05 RX ADMIN — Medication 10 MG: at 21:15

## 2024-10-05 RX ADMIN — BUSPIRONE HYDROCHLORIDE 5 MG: 5 TABLET ORAL at 17:24

## 2024-10-05 RX ADMIN — HYDROCODONE BITARTRATE AND ACETAMINOPHEN 1 TABLET: 5; 325 TABLET ORAL at 09:09

## 2024-10-05 RX ADMIN — HYDROCODONE BITARTRATE AND ACETAMINOPHEN 1 TABLET: 5; 325 TABLET ORAL at 21:15

## 2024-10-05 RX ADMIN — ATORVASTATIN CALCIUM 20 MG: 10 TABLET, FILM COATED ORAL at 21:15

## 2024-10-05 RX ADMIN — BUSPIRONE HYDROCHLORIDE 5 MG: 5 TABLET ORAL at 12:24

## 2024-10-05 RX ADMIN — CETIRIZINE HYDROCHLORIDE 10 MG: 10 TABLET ORAL at 09:09

## 2024-10-05 RX ADMIN — SALINE NASAL SPRAY 2 SPRAY: 1.5 SOLUTION NASAL at 15:52

## 2024-10-05 RX ADMIN — EMPAGLIFLOZIN 10 MG: 10 TABLET, FILM COATED ORAL at 09:09

## 2024-10-05 RX ADMIN — PREDNISONE 40 MG: 20 TABLET ORAL at 09:08

## 2024-10-05 RX ADMIN — SALINE NASAL SPRAY 2 SPRAY: 1.5 SOLUTION NASAL at 09:10

## 2024-10-05 RX ADMIN — ISOSORBIDE MONONITRATE 30 MG: 30 TABLET, EXTENDED RELEASE ORAL at 12:24

## 2024-10-05 RX ADMIN — IPRATROPIUM BROMIDE AND ALBUTEROL SULFATE 3 ML: .5; 3 SOLUTION RESPIRATORY (INHALATION) at 17:57

## 2024-10-05 RX ADMIN — BUDESONIDE AND FORMOTEROL FUMARATE DIHYDRATE 2 PUFF: 160; 4.5 AEROSOL RESPIRATORY (INHALATION) at 17:57

## 2024-10-05 RX ADMIN — HYDROCODONE BITARTRATE AND ACETAMINOPHEN 1 TABLET: 5; 325 TABLET ORAL at 15:52

## 2024-10-05 RX ADMIN — DOCUSATE SODIUM 50 MG AND SENNOSIDES 8.6 MG 2 TABLET: 8.6; 5 TABLET, FILM COATED ORAL at 09:10

## 2024-10-05 RX ADMIN — METOPROLOL SUCCINATE 25 MG: 25 TABLET, EXTENDED RELEASE ORAL at 09:10

## 2024-10-05 RX ADMIN — SALINE NASAL SPRAY 2 SPRAY: 1.5 SOLUTION NASAL at 12:24

## 2024-10-05 RX ADMIN — FLUTICASONE PROPIONATE 2 SPRAY: 50 SPRAY, METERED NASAL at 09:11

## 2024-10-05 RX ADMIN — PANTOPRAZOLE SODIUM 40 MG: 40 TABLET, DELAYED RELEASE ORAL at 09:10

## 2024-10-05 RX ADMIN — BUDESONIDE AND FORMOTEROL FUMARATE DIHYDRATE 2 PUFF: 160; 4.5 AEROSOL RESPIRATORY (INHALATION) at 07:09

## 2024-10-05 RX ADMIN — ASPIRIN 81 MG: 81 TABLET, COATED ORAL at 09:08

## 2024-10-05 RX ADMIN — IPRATROPIUM BROMIDE AND ALBUTEROL SULFATE 3 ML: .5; 3 SOLUTION RESPIRATORY (INHALATION) at 14:27

## 2024-10-05 RX ADMIN — DOCUSATE SODIUM 50 MG AND SENNOSIDES 8.6 MG 2 TABLET: 8.6; 5 TABLET, FILM COATED ORAL at 21:15

## 2024-10-05 NOTE — PROGRESS NOTES
[facility] Medicine Services  INPATIENT PROGRESS NOTE    Patient Name: Karoline Prater  Date of Admission: 9/19/2024  Today's Date: 10/05/24  Length of Stay: 15  Primary Care Physician: Irving Alexis MD    Subjective   Chief Complaint: Shortness of breath  HPI   10/1 Patient dyspneic at rest continues to require 7 to 10 L nasal cannula.  He has had epistaxis that is now resolved.  10/02 Patient has ambulated with pulse oximetry and had significant hypoxemia requiring 15 L oxygen nasal cannula to recover.  Although he does appear at baseline.  10/03 No new complaints.  Nasal congestion is improving no further epistaxis.  Trying to discuss poor prognosis patient seems evasive.  Will ask for a palliative care consult.  10/04 Patient resting comfortably.  States his breathing is feeling better but he is still having high flow oxygen requirements.  Has requested to follow-up with his oncologist.  Moved bowels with formed stools and a little bit of rectal irritation.    Today:  No new complaints, resting comfortably. Dyspneic at rest baseline.     Review of Systems   All pertinent negatives and positives are as above. All other systems have been reviewed and are negative unless otherwise stated.     Objective    Temp:  [97.4 °F (36.3 °C)-98.2 °F (36.8 °C)] 97.4 °F (36.3 °C)  Heart Rate:  [59-97] 59  Resp:  [16-22] 22  BP: (114-147)/(60-76) 114/64  Physical Exam  Constitutional:       General: He is not in acute distress.     Appearance: He is not ill-appearing.   HENT:      Head: Normocephalic.      Nose:      Comments: No active epistaxis, clot on both nosetrils      Mouth/Throat:      Mouth: Mucous membranes are moist.   Eyes:      Pupils: Pupils are equal, round, and reactive to light.   Cardiovascular:      Rate and Rhythm: Normal rate.   Pulmonary:      Effort: Pulmonary effort is normal.      Comments: Scattered coarse, crackly BSs on 7LNC during my assessment with  "02 sats 92-93% - he had just returned to bed shortly before my arrival  Musculoskeletal:         General: No deformity.   Skin:     General: Skin is warm.   Neurological:      Mental Status: He is alert.      Motor: Weakness present.   Psychiatric:         Mood and Affect: Mood normal.     Results Review:  I have reviewed the labs, radiology results, and diagnostic studies.    Laboratory Data:   Results from last 7 days   Lab Units 10/01/24  0435 09/29/24  0400   WBC 10*3/mm3 7.31 7.00   HEMOGLOBIN g/dL 11.1* 10.9*   HEMATOCRIT % 37.3* 37.1*   PLATELETS 10*3/mm3 182 159        Results from last 7 days   Lab Units 10/01/24  0339 09/29/24  0400   SODIUM mmol/L 136 140   POTASSIUM mmol/L 4.8 4.2   CHLORIDE mmol/L 106 103   CO2 mmol/L 18.0* 25.0   BUN mg/dL 37* 42*   CREATININE mg/dL 1.59* 2.09*   CALCIUM mg/dL 10.0 10.2   GLUCOSE mg/dL 130* 103*       Culture Data:   No results found for: \"BLOODCX\", \"URINECX\", \"WOUNDCX\", \"MRSACX\", \"RESPCX\", \"STOOLCX\"    Radiology Data:   Imaging Results (Last 24 Hours)       ** No results found for the last 24 hours. **            I have reviewed the patient's current medications.     Assessment/Plan   Assessment  Active Hospital Problems    Diagnosis     **Acute on chronic respiratory failure with hypoxia     Acute respiratory failure     Epistaxis     Chronic rhinitis     Acute-on-chronic respiratory failure     Pneumonitis     Bacterial pneumonia     COVID-19 virus infection     Chronic heart failure with preserved ejection fraction (HFpEF)     COPD (chronic obstructive pulmonary disease)     A-fib     Pulmonary fibrosis     Stage 3b chronic kidney disease     Malignant neoplasm of upper lobe of right lung     Essential hypertension        Treatment Plan  Vitals every 4 hours  Cardiac diet  IV fluids saline lock  Up ad shae.    COPD with acute on chronic respiratory failure  Continue oxygen 7 L nasal cannula with humidifier  Chest x-ray repeat noted  Pulmonology following  Oncology " follow-up Dr. 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    He is At risk of readmissions as he has had frequent hospitalizations this year, recommended palliative care but patient does not seem amenable to consider other     Medical Decision Making  Number and Complexity of problems: 4 complex problems        Conditions and Status        Condition is unchanged.     [facility] Data  External documents reviewed: none  Cardiac tracing (EKG, telemetry) interpretation: no new EKGs this AM  Radiology interpretation: no new radiology studies; chest x-ray 9/30/2024   Labs reviewed: as above  Any tests that were considered but not ordered: none     Decision rules/scores evaluated (example [id number], Wells, etc): none     Discussed with: patient     Care Planning  Shared decision making: Discussed with patient with agreement to proceed with treatment plan as outlined  Code status and discussions: Full code     Disposition  Social Determinants of Health that impact treatment or disposition: None apparent at this time; patient does live at home alone  I expect the patient to be discharged to home with home health (possibly) when 02  requirement has improved (still requiring 8-10LNC with even minimal activity)    Electronically signed by Wilber Carbajal MD, 10/05/24, 12:57 CDT.

## 2024-10-05 NOTE — PLAN OF CARE
Goal Outcome Evaluation:      Pt is alert and oriented. He is on 9L humidified high flow nasal cannula.Meds given per MAR, will go to LTACH next week. No acute events during the day.

## 2024-10-05 NOTE — PLAN OF CARE
Problem: Adult Inpatient Plan of Care  Goal: Plan of Care Review  Outcome: Progressing  Goal: Patient-Specific Goal (Individualized)  Outcome: Progressing  Goal: Absence of Hospital-Acquired Illness or Injury  Outcome: Progressing  Intervention: Identify and Manage Fall Risk  Recent Flowsheet Documentation  Taken 10/4/2024 2111 by Ashley Jo RN  Safety Promotion/Fall Prevention: safety round/check completed  Intervention: Prevent Skin Injury  Recent Flowsheet Documentation  Taken 10/4/2024 2111 by Ashley Jo RN  Body Position: supine  Intervention: Prevent and Manage VTE (Venous Thromboembolism) Risk  Recent Flowsheet Documentation  Taken 10/4/2024 2111 by Ashley Jo RN  Activity Management: activity encouraged  VTE Prevention/Management: (MAR) other (see comments)  Goal: Optimal Comfort and Wellbeing  Outcome: Progressing  Intervention: Provide Person-Centered Care  Recent Flowsheet Documentation  Taken 10/4/2024 2111 by Ashley Jo RN  Trust Relationship/Rapport: care explained  Goal: Readiness for Transition of Care  Outcome: Progressing     Problem: Asthma Comorbidity  Goal: Maintenance of Asthma Control  Outcome: Progressing  Intervention: Maintain Asthma Symptom Control  Recent Flowsheet Documentation  Taken 10/4/2024 2111 by Ashley Jo RN  Medication Review/Management: medications reviewed     Problem: Behavioral Health Comorbidity  Goal: Maintenance of Behavioral Health Symptom Control  Outcome: Progressing  Intervention: Maintain Behavioral Health Symptom Control  Recent Flowsheet Documentation  Taken 10/4/2024 2111 by Ashley Jo RN  Medication Review/Management: medications reviewed     Problem: COPD (Chronic Obstructive Pulmonary Disease) Comorbidity  Goal: Maintenance of COPD Symptom Control  Outcome: Progressing  Intervention: Maintain COPD-Symptom Control  Recent Flowsheet Documentation  Taken 10/4/2024 2111 by Ashley Jo  JOEL RN  Medication Review/Management: medications reviewed     Problem: Diabetes Comorbidity  Goal: Blood Glucose Level Within Targeted Range  Outcome: Progressing     Problem: Heart Failure Comorbidity  Goal: Maintenance of Heart Failure Symptom Control  Outcome: Progressing  Intervention: Maintain Heart Failure-Management  Recent Flowsheet Documentation  Taken 10/4/2024 2111 by Ashley Jo RN  Medication Review/Management: medications reviewed     Problem: Hypertension Comorbidity  Goal: Blood Pressure in Desired Range  Outcome: Progressing  Intervention: Maintain Blood Pressure Management  Recent Flowsheet Documentation  Taken 10/4/2024 2111 by Ashley Jo RN  Medication Review/Management: medications reviewed     Problem: Obstructive Sleep Apnea Risk or Actual Comorbidity Management  Goal: Unobstructed Breathing During Sleep  Outcome: Progressing     Problem: Osteoarthritis Comorbidity  Goal: Maintenance of Osteoarthritis Symptom Control  Outcome: Progressing  Intervention: Maintain Osteoarthritis Symptom Control  Recent Flowsheet Documentation  Taken 10/4/2024 2111 by Ashley Jo RN  Activity Management: activity encouraged  Assistive Device Utilized: walker  Medication Review/Management: medications reviewed     Problem: Pain Chronic (Persistent) (Comorbidity Management)  Goal: Acceptable Pain Control and Functional Ability  Outcome: Progressing  Intervention: Manage Persistent Pain  Recent Flowsheet Documentation  Taken 10/4/2024 2111 by Ashley Jo RN  Medication Review/Management: medications reviewed     Problem: Seizure Disorder Comorbidity  Goal: Maintenance of Seizure Control  Outcome: Progressing     Problem: Adjustment to Illness (Sepsis/Septic Shock)  Goal: Optimal Coping  Outcome: Progressing     Problem: Bleeding (Sepsis/Septic Shock)  Goal: Absence of Bleeding  Outcome: Progressing     Problem: Glycemic Control Impaired (Sepsis/Septic Shock)  Goal: Blood  Glucose Level Within Desired Range  Outcome: Progressing     Problem: Infection Progression (Sepsis/Septic Shock)  Goal: Absence of Infection Signs and Symptoms  Outcome: Progressing  Intervention: Promote Recovery  Recent Flowsheet Documentation  Taken 10/4/2024 2111 by Ashley Jo RN  Activity Management: activity encouraged     Problem: Nutrition Impaired (Sepsis/Septic Shock)  Goal: Optimal Nutrition Intake  Outcome: Progressing     Problem: Fall Injury Risk  Goal: Absence of Fall and Fall-Related Injury  Outcome: Progressing  Intervention: Identify and Manage Contributors  Recent Flowsheet Documentation  Taken 10/4/2024 2111 by Ashley Jo RN  Medication Review/Management: medications reviewed  Intervention: Promote Injury-Free Environment  Recent Flowsheet Documentation  Taken 10/4/2024 2111 by Ashley Jo RN  Safety Promotion/Fall Prevention: safety round/check completed     Problem: Breathing Pattern Ineffective  Goal: Effective Breathing Pattern  Outcome: Progressing  Intervention: Promote Improved Breathing Pattern  Recent Flowsheet Documentation  Taken 10/4/2024 2111 by Ashley Jo RN  Head of Bed (HOB) Positioning: HOB elevated     Problem: Skin Injury Risk Increased  Goal: Skin Health and Integrity  Outcome: Progressing  Intervention: Optimize Skin Protection  Recent Flowsheet Documentation  Taken 10/4/2024 2111 by Ashley Jo RN  Head of Bed (HOB) Positioning: HOB elevated

## 2024-10-05 NOTE — PROGRESS NOTES
[facility] [place] [facility]    Patient: Karoline Prater  1940   MR# [id number]   Acct# [id number]  10/05/24   06:21 CDT  Referring Provider: Wilber Carbajal,*    Chief Complaint: COVID, severe COPD, chronic respiratory failure oxygen dependent    Interval history: He is on 8 L high flow with an O2 sat of 90%.  Worked yesterday with PT/OT with increased oxygen demand.  He is afebrile.  He is awaiting admission to LTAC.  No new labs or imaging for review.  He is currently getting neb treatment with RT in the room.  He indicates that other than his breathing he is feeling improved.  No overnight events.    Meds:  acetaminophen, 1,000 mg, Oral, Nightly  aspirin, 81 mg, Oral, Daily  atorvastatin, 20 mg, Oral, Nightly  budesonide-formoterol, 2 puff, Inhalation, BID - RT  busPIRone, 5 mg, Oral, TID With Meals  cetirizine, 10 mg, Oral, Daily  empagliflozin, 10 mg, Oral, Daily  fluticasone, 2 spray, Each Nare, Daily  guaiFENesin, 600 mg, Oral, Q12H  HYDROcodone-acetaminophen, 1 tablet, Oral, TID  Hydrocort-Pramoxine (Perianal), 1 Application, Rectal, Daily  ipratropium-albuterol, 3 mL, Nebulization, 4x Daily - RT  isosorbide mononitrate, 30 mg, Oral, Daily Before Lunch  melatonin, 10 mg, Oral, Nightly  metoprolol succinate XL, 25 mg, Oral, Daily  pantoprazole, 40 mg, Oral, Daily  predniSONE, 40 mg, Oral, Daily With Breakfast  senna-docusate sodium, 2 tablet, Oral, BID  sodium bicarbonate, 650 mg, Oral, TID  sodium chloride, 2 spray, Each Nare, 5x Daily  tamsulosin, 0.4 mg, Oral, Nightly      sodium chloride, 2 spray        Physical Exam:  SpO2 Percentage    10/04/24 2342 10/05/24 0301 10/05/24 0442   SpO2: 90% 92% 90%     Body mass index is 27.14 kg/m².   Temp:  [97.4 °F (36.3 °C)-98.2 °F (36.8 °C)] 97.8 °F (36.6 °C)  Heart Rate:  [68-89] 73  Resp:  [16-22] 20  BP: (126-147)/(60-76) 132/60  Intake/Output Summary (Last 24 hours) at 10/5/2024 0621  Last data filed at 10/5/2024  0442  Gross per 24 hour   Intake 720 ml   Output 800 ml   Net -80 ml     Physical Exam  Vitals reviewed.   Constitutional:       Appearance: He is well-developed.      Interventions: Nasal cannula in place.   Cardiovascular:      Rate and Rhythm: Normal rate.   Pulmonary:      Effort: Pulmonary effort is normal.      Breath sounds: Decreased breath sounds present.   Musculoskeletal:         General: Normal range of motion.      Cervical back: Normal range of motion and neck supple.   Neurological:      Mental Status: He is alert and oriented to person, place, and time.   Psychiatric:         Behavior: Behavior normal.         Thought Content: Thought content normal.         Judgment: Judgment normal.       Electronically signed by MARITZA Ballard, 10/5/2024, 06:22 CDT      Physician Substantive Portion: Medical Decision Making to follow:      Result Review    Laboratory Data:  Results from last 7 days   Lab Units 10/01/24  0435 09/29/24  0400   WBC 10*3/mm3 7.31 7.00   HEMOGLOBIN g/dL 11.1* 10.9*   PLATELETS 10*3/mm3 182 159     Results from last 7 days   Lab Units 10/01/24  0339 09/29/24  0400   SODIUM mmol/L 136 140   POTASSIUM mmol/L 4.8 4.2   CHLORIDE mmol/L 106 103   CO2 mmol/L 18.0* 25.0   BUN mg/dL 37* 42*   CREATININE mg/dL 1.59* 2.09*   CALCIUM mg/dL 10.0 10.2         Lab 10/01/24  0339 09/29/24  0400   GLUCOSE 130* 103*             Lab 10/01/24  0339   PROBNP 1,967.0*     Microbiology Results (last 10 days)       ** No results found for the last 240 hours. **           Recent films:  CT Angiogram Chest    Result Date: 10/3/2024  EXAM: CT ANGIOGRAM CHEST- - 10/3/2024 12:24 PM  HISTORY: hypoxia, PE eval; R09.02-Hypoxemia; J98.4-Other disorders of lung; N39.0-Urinary tract infection, site not specified; Z74.09-Other reduced mobility; J44.9-Chronic obstructive pulmonary disease, unspecified. 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  COMPARISON: 9/21/2024.  DOSE LENGTH PRODUCT: 366.39 mGy.cm. Automatic exposure control was utilized to make radiation dose as low as reasonably achievable.  TECHNIQUE: Enhanced axial CT images obtained with multiplanar reformats. 3D postprocessing, including MIPs, performed and images saved to PACS.  FINDINGS: AIRWAYS/PULMONARY PARENCHYMA: Linear probable secretions in the trachea. Main airways patent. Diffuse small airway thickening.  RIGHT upper lobe masslike opacity measures 4.5 x 3.6 cm, concerning for malignancy. Increased patchy opacities especially in the RIGHT lung. Interlobular septal thickening throughout.  Similar RIGHT middle lobe 2 x 1 cm pulmonary nodule on axial series 7, image 67 compared to 9/21/2024.  Similar RIGHT pleural thickening. No pneumothorax or drainable pleural fluid.  VESSELS: Contrast bolus is adequate. No evidence of pulmonary embolism. Enlarged main pulmonary artery, which can be seen with pulmonary artery hypertension. Aorta normal in course and caliber.  CARDIAC: Normal heart size with scattered coronary artery calcifications. Metallic device at the RIGHT ventricular apex, likely wireless pulse generator. No pericardial effusion.   MEDIASTINUM: Similar mediastinal adenopathy including an AP window lymph node measuring 1.5 cm on axial series 5, image 46 compared to 9/21/2024.  Esophagus has normal coarse, caliber and wall thickness.  EXTRATHORACIC: The visualized portions of the thyroid gland are unremarkable. No thoracic inlet or axillary adenopathy. Port in the RIGHT chest wall, catheter tip in the mid SVC. There may be a component of chronic RIGHT jugular vein stenosis as there is opacification of collateral vessels in the chest.  INCLUDED UPPER ABDOMEN: Pneumobilia. Otherwise visualized portion of the liver, atrophic pancreas, spleen, adrenal glands appear within normal limits. Nephrolithiasis versus contrast excretion in the kidneys.  OSSEOUS: There are  mild degenerative changes of the visualized spine. No acute or suspicious bony finding.       Impression: 1. Similar masslike spiculated opacity at the RIGHT upper lobe. Similar interlobular septal thickening with increased patchy opacities especially in the RIGHT lung. Appearance highly concerning for malignancy with lymphangitic spread of tumor. 2. Similar mild adenopathy. 3. Coronary artery calcifications with metallic device in the RIGHT ventricular apex, favor wireless pulse generator. 4. 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  This report was signed and finalized on 10/3/2024 3:29 PM by Dr Kristen Turpin MD.            Pulmonary Assessment:  Acute on chronic hypoxic respiratory failure  Right upper lobe adenocarcinoma  Recent COVID-19 pneumonia  Interstitial lung disease    Recommend/plan:   -Continue supplemental oxygen as needed wean as tolerated, goal O2 sat of 88-92%  -CTA this visit concerning for lymphangitic spread of his known malignancy, suspect this is the driving factor to his respiratory failure  -Started him on steroids yesterday with a plan to taper after discharge  -Agree with palliative care evaluation  -Will continue scheduled DuoNeb with Symbicort.  Changed to triple therapy on discharge with Breztri and as needed nebulized albuterol, this has been ordered    This visit was performed by both a physician and an APRN.  I performed all aspects of the medical decision making as documented.  Electronically signed by Dylan Gordon DO, 10/5/2024, 10:05 CDT

## 2024-10-06 PROCEDURE — 97530 THERAPEUTIC ACTIVITIES: CPT

## 2024-10-06 PROCEDURE — 94799 UNLISTED PULMONARY SVC/PX: CPT

## 2024-10-06 PROCEDURE — 99232 SBSQ HOSP IP/OBS MODERATE 35: CPT | Performed by: INTERNAL MEDICINE

## 2024-10-06 PROCEDURE — 94761 N-INVAS EAR/PLS OXIMETRY MLT: CPT

## 2024-10-06 PROCEDURE — 63710000001 PREDNISONE PER 1 MG: Performed by: INTERNAL MEDICINE

## 2024-10-06 PROCEDURE — 94664 DEMO&/EVAL PT USE INHALER: CPT

## 2024-10-06 RX ADMIN — SODIUM BICARBONATE 650 MG: 650 TABLET ORAL at 08:52

## 2024-10-06 RX ADMIN — SALINE NASAL SPRAY 2 SPRAY: 1.5 SOLUTION NASAL at 20:51

## 2024-10-06 RX ADMIN — EMPAGLIFLOZIN 10 MG: 10 TABLET, FILM COATED ORAL at 08:52

## 2024-10-06 RX ADMIN — METOPROLOL SUCCINATE 25 MG: 25 TABLET, EXTENDED RELEASE ORAL at 08:52

## 2024-10-06 RX ADMIN — ASPIRIN 81 MG: 81 TABLET, COATED ORAL at 08:52

## 2024-10-06 RX ADMIN — SALINE NASAL SPRAY 2 SPRAY: 1.5 SOLUTION NASAL at 06:23

## 2024-10-06 RX ADMIN — IPRATROPIUM BROMIDE AND ALBUTEROL SULFATE 3 ML: .5; 3 SOLUTION RESPIRATORY (INHALATION) at 05:55

## 2024-10-06 RX ADMIN — BUSPIRONE HYDROCHLORIDE 5 MG: 5 TABLET ORAL at 17:44

## 2024-10-06 RX ADMIN — BUSPIRONE HYDROCHLORIDE 5 MG: 5 TABLET ORAL at 11:38

## 2024-10-06 RX ADMIN — BUDESONIDE AND FORMOTEROL FUMARATE DIHYDRATE 2 PUFF: 160; 4.5 AEROSOL RESPIRATORY (INHALATION) at 05:55

## 2024-10-06 RX ADMIN — ISOSORBIDE MONONITRATE 30 MG: 30 TABLET, EXTENDED RELEASE ORAL at 11:38

## 2024-10-06 RX ADMIN — IPRATROPIUM BROMIDE AND ALBUTEROL SULFATE 3 ML: .5; 3 SOLUTION RESPIRATORY (INHALATION) at 14:11

## 2024-10-06 RX ADMIN — BUSPIRONE HYDROCHLORIDE 5 MG: 5 TABLET ORAL at 08:52

## 2024-10-06 RX ADMIN — DOCUSATE SODIUM 50 MG AND SENNOSIDES 8.6 MG 2 TABLET: 8.6; 5 TABLET, FILM COATED ORAL at 08:52

## 2024-10-06 RX ADMIN — PANTOPRAZOLE SODIUM 40 MG: 40 TABLET, DELAYED RELEASE ORAL at 08:52

## 2024-10-06 RX ADMIN — PRAMOXINE HYDROCHLORIDE HYDROCORTISONE ACETATE 1 APPLICATION: 100; 100 AEROSOL, FOAM TOPICAL at 08:51

## 2024-10-06 RX ADMIN — TAMSULOSIN HYDROCHLORIDE 0.4 MG: 0.4 CAPSULE ORAL at 20:31

## 2024-10-06 RX ADMIN — ACETAMINOPHEN 1000 MG: 500 TABLET, FILM COATED ORAL at 20:30

## 2024-10-06 RX ADMIN — SODIUM BICARBONATE 650 MG: 650 TABLET ORAL at 20:31

## 2024-10-06 RX ADMIN — CETIRIZINE HYDROCHLORIDE 10 MG: 10 TABLET ORAL at 08:52

## 2024-10-06 RX ADMIN — GUAIFENESIN 600 MG: 600 TABLET, EXTENDED RELEASE ORAL at 20:31

## 2024-10-06 RX ADMIN — Medication 10 MG: at 20:30

## 2024-10-06 RX ADMIN — GUAIFENESIN 600 MG: 600 TABLET, EXTENDED RELEASE ORAL at 08:52

## 2024-10-06 RX ADMIN — SALINE NASAL SPRAY 2 SPRAY: 1.5 SOLUTION NASAL at 17:44

## 2024-10-06 RX ADMIN — DOCUSATE SODIUM 50 MG AND SENNOSIDES 8.6 MG 2 TABLET: 8.6; 5 TABLET, FILM COATED ORAL at 20:30

## 2024-10-06 RX ADMIN — HYDROCODONE BITARTRATE AND ACETAMINOPHEN 1 TABLET: 5; 325 TABLET ORAL at 08:52

## 2024-10-06 RX ADMIN — HYDROCODONE BITARTRATE AND ACETAMINOPHEN 1 TABLET: 5; 325 TABLET ORAL at 20:30

## 2024-10-06 RX ADMIN — SALINE NASAL SPRAY 2 SPRAY: 1.5 SOLUTION NASAL at 11:38

## 2024-10-06 RX ADMIN — PREDNISONE 40 MG: 20 TABLET ORAL at 08:52

## 2024-10-06 RX ADMIN — ZOLPIDEM TARTRATE 2.5 MG: 5 TABLET ORAL at 20:51

## 2024-10-06 RX ADMIN — IPRATROPIUM BROMIDE AND ALBUTEROL SULFATE 3 ML: .5; 3 SOLUTION RESPIRATORY (INHALATION) at 10:21

## 2024-10-06 RX ADMIN — FLUTICASONE PROPIONATE 2 SPRAY: 50 SPRAY, METERED NASAL at 08:51

## 2024-10-06 RX ADMIN — BUDESONIDE AND FORMOTEROL FUMARATE DIHYDRATE 2 PUFF: 160; 4.5 AEROSOL RESPIRATORY (INHALATION) at 17:53

## 2024-10-06 RX ADMIN — ATORVASTATIN CALCIUM 20 MG: 10 TABLET, FILM COATED ORAL at 20:30

## 2024-10-06 RX ADMIN — SALINE NASAL SPRAY 2 SPRAY: 1.5 SOLUTION NASAL at 14:09

## 2024-10-06 RX ADMIN — SODIUM BICARBONATE 650 MG: 650 TABLET ORAL at 17:44

## 2024-10-06 RX ADMIN — HYDROCODONE BITARTRATE AND ACETAMINOPHEN 1 TABLET: 5; 325 TABLET ORAL at 17:44

## 2024-10-06 RX ADMIN — IPRATROPIUM BROMIDE AND ALBUTEROL SULFATE 3 ML: .5; 3 SOLUTION RESPIRATORY (INHALATION) at 17:53

## 2024-10-06 NOTE — THERAPY TREATMENT NOTE
Acute Care - Physical Therapy Treatment Note  [facility]     Patient Name: Karoline Prater  : 1940  MRN: [id number]  Today's Date: 10/6/2024      Visit Dx:     ICD-10-CM ICD-9-CM   1. Hypoxia  R09.02 799.02   2. Pneumonitis  J98.4 486   3. Acute UTI  N39.0 599.0   4. Impaired mobility [Z74.09]  Z74.09 799.89   5. Chronic obstructive pulmonary disease, unspecified COPD type  J44.9 496     Patient Active Problem List   Diagnosis    Dyspnea    Essential hypertension    NSVT (nonsustained ventricular tachycardia)    Malignant neoplasm of upper lobe of right lung    Stage 3b chronic kidney disease    Pancytopenia due to antineoplastic chemotherapy    Pneumonia due to infectious organism    Function kidney decreased    Cellulitis    Acute on chronic respiratory failure with hypoxia    Pulmonary fibrosis    Macrocytic anemia    Thrombocytopenia    Sepsis    Right pneumothorax    Hyperkalemia    Acute kidney injury superimposed on CKD stage 3b    GERD without esophagitis    A-fib    Former smoker    Chest pain    Tachycardia    Bilateral lower extremity edema    Metastatic adenocarcinoma of the RUL    Chronic diastolic congestive heart failure    COPD (chronic obstructive pulmonary disease)    S/P radiation > 12 weeks    CHF (congestive heart failure)    History of radiation therapy    Chronic heart failure with preserved ejection fraction (HFpEF)    Narrow complex tachycardia    Atrial flutter    Encounter for examination for normal comparison and control in clinical research program    CHF exacerbation    COVID-19 virus infection    Cytokine release syndrome, grade 2    Bacterial pneumonia    Hyperlipidemia    Coronary artery disease    Pneumonitis    Acute-on-chronic respiratory failure    Epistaxis    Chronic rhinitis    Acute respiratory failure     Past Medical History:   Diagnosis Date    Arthritis     Atrial fibrillation with rapid ventricular response 2019    Blindness of left eye     BPH with  obstruction/lower urinary tract symptoms     Cancer     stomach & lung    CHF (congestive heart failure)     CKD (chronic kidney disease) stage 3, GFR 30-59 ml/min     COPD with acute exacerbation 08/03/2024    Coronary artery disease     Elevated cholesterol     Essential hypertension 10/02/2017    Fibrotic lung diseases     GERD (gastroesophageal reflux disease)     Hearing loss     History of transfusion     Hydronephrosis of left kidney     Hyperlipidemia     Hypertension     pt was taken off of all of his medications for BP (atenolol, lisinopril, lasix) because his BP kept bottoming out so his primary dr told him to discontinue them 1-2 months ago (Jan/Feb 2019). pt states he takes no medications currently.    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 Patel is just going to keep an eye on it and not do surgery right now.    Mediastinal adenopathy 10/24/2018    [facility]    Monoclonal gammopathy of unknown significance (MGUS) 09/11/2018    Pancreatic mass     pt states he had this in 2013 but it went away on its own. Now recent CT shows it has come back so he is going to have an ultrasound on 3/13/19.    Shortness of breath      Past Surgical History:   Procedure Laterality Date    ABDOMINAL SURGERY      BRONCHOSCOPY N/A 10/24/2018    Procedure: BRONCHOSCOPY WITH BIOPSY, EBUS;  Surgeon: Gareth Becerra MD;  Location: [facility] OR;  Service: Pulmonary    CHOLECYSTECTOMY      COLONOSCOPY N/A 1/3/2019    Procedure: COLONOSCOPY WITH ANESTHESIA;  Surgeon: Randy Somers DO;  Location: [facility] ENDOSCOPY;  Service: Gastroenterology    CYSTOSCOPY, RETROGRADE PYELOGRAM AND STENT INSERTION Left 3/8/2019    Procedure: CYSTOSCOPY RETROGRADE BILATERAL PYELOGRAM;  Surgeon: Jos Sylvester MD;  Location: [facility] OR;  Service: Urology    ENDOSCOPY N/A 12/11/2018     Procedure: ESOPHAGOGASTRODUODENOSCOPY WITH ANESTHESIA;  Surgeon: Randy Somers DO;  Location: [facility] ENDOSCOPY;  Service: Gastroenterology    ENDOSCOPY N/A 4/29/2019    Procedure: ESOPHAGOGASTRODUODENOSCOPY WITH ANESTHESIA;  Surgeon: Lilliam Jj MD;  Location: [facility] OR;  Service: Gastroenterology    ENDOSCOPY N/A 5/9/2019    Procedure: ESOPHAGOGASTRODUODENOSCOPY WITH ANESTHESIA;  Surgeon: Pilo Bansal MD;  Location: [facility] ENDOSCOPY;  Service: Gastroenterology    EYE SURGERY Left 1964    FOOT SURGERY Right 1966    joint    FRACTURE SURGERY      PACEMAKER IMPLANTATION Left 8/29/2024    Procedure: Leadless pacemaker implant and AVN ablation;  Surgeon: Carlitos Bowen MD;  Location: [facility] CATH INVASIVE LOCATION;  Service: Cardiovascular;  Laterality: Left;     PT Assessment (Last 12 Hours)       PT Evaluation and Treatment       Row Name 10/06/24 0815          Physical Therapy Time and Intention    Subjective Information complains of;weakness;fatigue;dyspnea  -LY     Document Type therapy note (daily note)  -LY     Mode of Treatment physical therapy  -LY       Row Name 10/06/24 0815          General Information    Existing Precautions/Restrictions fall;oxygen therapy device and L/min  5-8L hiflow  -LY       Row Name 10/06/24 0815          Pain    Pretreatment Pain Rating 0/10 - no pain  -LY     Posttreatment Pain Rating 0/10 - no pain  -LY       Row Name 10/06/24 0815          Bed Mobility    Supine-Sit [place] (Bed Mobility) standby assist  -LY     Assistive Device (Bed Mobility) head of bed elevated;bed rails  -LY       Row Name 10/06/24 0815          Transfers    Comment, (Transfers) steps to the chair  -LY       Row Name 10/06/24 0815          Bed-Chair Transfer    Bed-Chair [place] (Transfers) contact guard  -LY       Row Name 10/06/24 0815          Sit-Stand Transfer    Sit-Stand [place] (Transfers) contact guard  -LY       Row Name 10/06/24 0815          Stand-Sit Transfer     Stand-Sit [place] (Transfers) contact guard  -LY       Row Name 10/06/24 0815          Plan of Care Review    Plan of Care Reviewed With patient  -LY     Progress improving  -LY     Outcome Evaluation PT tx completed. Pt in bed, resting on 5L O2 at rest. Did increase O2 to 7L in preparation for activity. SBA for bed mobility. O2 sats decreased to 78%. Education provided on pursed lip breathing. Unable to recover, increased O2 to 8L. Within a couple of min able to reach upper 80s. Pt then took steps to the chair with CGA. O2 sats decreased once again. Pt reclined in chair. Achieved 88-90% within 5 min. O2 slowly lowered back to 5L. Will cont to follow.  -LY       Row Name 10/06/24 0815          Positioning and Restraints    Pre-Treatment Position in bed  -LY     Post Treatment Position chair  -LY     In Chair reclined;call light within reach;encouraged to call for assist;notified nsg  -LY               User Key  (r) = Recorded By, (t) = Taken By, (c) = Cosigned By      Initials Name Provider Type    LY Susanne Lizarraga, PTA Physical Therapist Assistant                    Physical Therapy Education       Title: PT OT SLP Therapies (In Progress)       Topic: Physical Therapy (In Progress)       Point: Mobility training (Done)       Learning Progress Summary             Patient Acceptance, E, VU,NR by LEIDY at 10/1/2024 1102    Comment: benefits of activity, progression of PT    Acceptance, E,TB, VU by  at 9/25/2024 0755    Acceptance, E,TB, VU by  at 9/24/2024 0745    Acceptance, E, VU,DU,NR by LEIDY at 9/20/2024 1400    Comment: benefits of activity, progression of PT POC                         Point: Home exercise program (Not Started)       Learner Progress:  Not documented in this visit.              Point: Body mechanics (Not Started)       Learner Progress:  Not documented in this visit.              Point: Precautions (Not Started)       Learner Progress:  Not documented in this visit.                               User Key       Initials Effective Dates Name Provider Type Discipline    LEIDY 02/03/23 -  Cristian Hirsch PT DPT Physical Therapist PT     06/19/24 -  Rosa Aiken RN Registered Nurse Nurse                  PT Recommendation and Plan  Anticipated Discharge Disposition (PT): sub acute care setting, skilled nursing facility  Plan of Care Reviewed With: patient  Progress: improving  Outcome Evaluation: PT tx completed. Pt in bed, resting on 5L O2 at rest. Did increase O2 to 7L in preparation for activity. SBA for bed mobility. O2 sats decreased to 78%. Education provided on pursed lip breathing. Unable to recover, increased O2 to 8L. Within a couple of min able to reach upper 80s. Pt then took steps to the chair with CGA. O2 sats decreased once again. Pt reclined in chair. Achieved 88-90% within 5 min. O2 slowly lowered back to 5L. Will cont to follow.   Outcome Measures       Row Name 10/03/24 1124             How much help from another person do you currently need...    Turning from your back to your side while in flat bed without using bedrails? 3  -MF      Moving from lying on back to sitting on the side of a flat bed without bedrails? 3  -MF      Moving to and from a bed to a chair (including a wheelchair)? 3  -MF      Standing up from a chair using your arms (e.g., wheelchair, bedside chair)? 3  -MF      Climbing 3-5 steps with a railing? 2  -MF      To walk in hospital room? 3  -MF      AM-PAC 6 Clicks Score (PT) 17  -MF      Highest Level of Mobility Goal 5 --> Static standing  -MF         Functional Assessment    Outcome Measure Options AM-PAC 6 Clicks Basic Mobility (PT)  -MF                User Key  (r) = Recorded By, (t) = Taken By, (c) = Cosigned By      Initials Name Provider Type    Lucrecia Roblero PTA Physical Therapist Assistant                     Time Calculation:    PT Charges       Row Name 10/06/24 09             Time Calculation    Start Time 0815  -LY      Stop Time 0844   -LY      Time Calculation (min) 29 min  -LY      PT Received On 10/06/24  -LY         Time Calculation- PT    Total Timed Code Minutes- PT 29 minute(s)  -LY         Timed Charges    07090 - PT Therapeutic Activity Minutes 29  -LY         Total Minutes    Timed Charges Total Minutes 29  -LY       Total Minutes 29  -LY                User Key  (r) = Recorded By, (t) = Taken By, (c) = Cosigned By      Initials Name Provider Type    LY Susanne Lizarraga PTA Physical Therapist Assistant                  Therapy Charges for Today       Code Description Service Date Service Provider Modifiers Qty    [id number] HC PT THERAPEUTIC ACT EA 15 MIN 10/6/2024 Susanne Lizarraga PTA GP 2            PT G-Codes  Outcome Measure Options: AM-PAC 6 Clicks Basic Mobility (PT)  AM-PAC 6 Clicks Score (PT): 17    Susanne Lizarraga PTA  10/6/2024

## 2024-10-06 NOTE — PLAN OF CARE
Goal Outcome Evaluation:      Pt is A&Ox4 and afebrile. No complaints of pain this shift. HE worked well with PT and spent most of the day in the chair. He is currently on 6L humidified nasal cannula. HE has been Vpaced 68-82 bpm.

## 2024-10-06 NOTE — CONSULTS
MEDICAL ONCOLOGY CONSULTATION    Pt Name: Karoline Prater  MRN: [id number]  YOB: 1940  Date of evaluation: 10/6/2024    REASON FOR CONSULTATION:  Lung cancer  REQUESTING PHYSICIAN:Hospitalist    History Obtained From:    patient, electronic medical record    HISTORY OF PRESENT ILLNESS:    The patient is well-established Dr. Jeff Michele.  He has diagnosed stage IV non-small cell lung carcinoma, adenocarcinoma type.  Patient has been off antineoplastic therapy.  Patient has multiple hospitalizations here at [facility] in addition, significant history of chronic respiratory failure secondary to pulmonary fibrosis bilateral.  Patient now hospitalized with acute on chronic hypoxemic respiratory failure requiring high flow and O2 supplementation.  Patient looks very sick.  I was consulted for continued of care.  Extensive review of medical records.  Extensive review of all lab and imaging studies.  Prior oncological history as detailed below    PRIOR CANCER HISTORY    The patient is a 84 years old male who is well-established in my clinic with Dr. Jeff Michele.  He has a complex medical history and has several comorbidities to include a history of hypertension, chronic respiratory failure, heart failure with preserved EF 61-65%, pulmonary hypertension, history of DVT on anticoagulation, history of GERD, history of stage III CKD.  Patient was recently hospitalized at [facility].  The patient called the clinic on 8/2/2024 with complaints of increased shortness of breath over the last few days.  In addition.  Also complains of bilateral leg swelling.  Patient denies any chest pain.  Denies any hemoptysis.  Denies any fever or chills.  He was seen in the emergency department at [facility].  Troponin was mildly elevated.  proBNP 295.  He received one-time dose of Lasix 20 mg.  Imaging studies as below.  He was admitted to the hospitalist with consultation to oncology.     8/2/2024-chest  x-ray-a mass in the upper to midlung zone on the right is larger compared to prior study measuring in the range of about 4.8 cm.  Relatively stable pulmonary fibrosis.  Pleural thickening/effusion on the right also appears relatively stable.  Findings of cardiomegaly.     Most recent CT scans     CT scan of the chest without contrast at [facility] on 2024 was compared to 2024 reported the following  1. A spiculated mass in the right upper lobe laterally is larger on the   current study.   2. There is consolidation around a previously described nodule in the   right lung apex likely related to posttreatment change.   3. Diffuse reticulonodular fibrotic changes throughout both lungs.   Emphysema.   4. Pleural thickening on the right that is more focal in the right lung   base laterally. Metastatic pleural disease is considered.   5. Ectasia of the ascending thoracic aorta measuring 4.3 cm. Main   pulmonary artery segment is dilated measuring 3.6 cm. Mild cardiomegaly. ...      CT scan of the abdomen pelvis at [facility] on 2024 was compared to 2023 and reported the followin. There is a mildly dilated proximal small bowel loop measuring 4 cm.  No other small bowel distention is seen. This could be transient or due  to partial obstruction. Gastric wall thickening likely related to under  distention. There is under distention of portions of the colon and  moderate stool in the colon.  2. 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  Fibrosis in the lung bases with emphysema.  3. Left renal cyst. A 6 mm nonobstructive renal stone lower pole on the  left.  4. Mildly enlarged prostate measuring 4.7 cm.  5. Air within the biliary tree likely related to prior sphincterotomy.  Prior cholecystectomy.  6. Coarsening of the trabecula in the right femoral neck and  trochanteric region could be related to early Paget's disease. There is  no cortical thickening. A lipo sclerosing myxofibrous tumor  "is felt to  be less likely as there is no sclerotic margin.     Dr Michele discussed with dr Cool scans findings  Dr. Cool was gracious enough to review imaging studies with the following assessment by Dr. Danny Cool:  \"I have reviewed the CT scan of the thorax on this admission for atrial fibrillation with rapid ventricular response.     He completed recent SBRT radiotherapy for 2 right lung nodules on February 7, 2024.     Review of the current CT scan appears consistent with postradiation change.  We cannot entirely rule out progression however this appears unlikely with the chronology and radiographic appearance.     He is scheduled to return to our department in approximately 1 month with follow-up CT scan of the thorax and we will follow this closely with serial CT scans of the thorax.\"     Dr Michele has rescheduled Mr. Prater to see me in follow-up on 9/18/2024 at 10:30 AM     He completed recent SBRT radiotherapy for 2 right lung nodules on February 7, 2024.      It was felt on visit 7/2/2024 that the overall findings were stable.     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     He has a remote history of a pancreatic mass identified and biopsied by Dr. Alexis on 10/29/03.  Pathology negative on open biopsy.    Fahad completed 4 cycles of combination chemotherapy with carboplatin, Keytruda, Alimta on 7/5/2019.   Maintenance Keytruda and Alimta every 3 weeks was delivered.   The final cycle number #25 of Keytruda/Alimta was delivered on 10/29/2020.  Harry has been on a chemotherapy holiday since October 2020 due to issues of dyspnea and shortness of breath, requiring episodic steroids.     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    Imaging studies with CT scan of the chest on 11/9/2023 and a follow-up PET scan on 12/7/2023 documented localized progressive disease in the RUL of the lung.  He was referred to Dr. Danny Cool at [facility] for SBRT.    SBRT by Dr. Danny Cool was delivered in 5 treatment fractions for a total dose of 5000 cGy. XRT was initiated 1/24/2024 and was completed on 2/7/2024    Imaging were performed on 3/20/2024 to assess response to treatment and restaging on a chemotherapy.     CT CHEST WO CONTRAST AT [facility] on 3/20/2024: Compared to 11/08/2023  1.3 cm partially calcified subcarinal lymph node, unchanged.   2.0 x 1.5 x 1.9 cm right upper lobe mass, previously 2.0 x 2.5 x 1.9 cm   1.8 x 3.0 x 2.0 right upper lobe mass, previously 3.5 x 1.9 x 1.6 cm   Emphysematous changes with subpleural reticulations and possible honeycombing and fibrosis are noted with pleural thickening again observed along the right lateral chest wall extending to the diaphragmatic surface.   There are scattered subpleural calcifications     CT ABDOMEN PELVIS WO CONTRAST AT [facility] on 3/20/2024: compared to 11/08/2023  No evidence of metastasis in the abdomen/pelvis.         Mr. Prater was admitted on 6/29/2024 with chest pain on exertion elevated troponin level for cardiac evaluation and management.    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   Fahad has benign prostatic hypertrophy (BPH) that is stable on Flomax.   Orthostatic hypotension resolved with adjustment of metoprolol by Dr. Alexis   He takes Cozaar, metoprolol, Lasix and spironolactone without new cardiac issues.   Protonix continues without any new exacerbations of GERD.   Lower extremity edema overall has actually improved to some degree           DETAILED TUMOR AND  HEMATOLOGICAL HISTORIES COPIED FROM MY OFFICE RECORDS     TUMOR HISTORY: Adenocarcinoma on the RUL of the lung 11/27/2018  Karoline had CT scans performed for new onset of weight loss of 13 pounds over the previous year , associated with increased fatigue.   He denied respiratory symptoms of shortness of air, cough or productive sputum.    A CT scan of the chest on 9/12/2018 had been ordered by Dr. Irving Alexis due to weight loss and identified a new lobulated right upper lobe 5.7 cm mass that extended back to the right hilum.   Numerous mediastinal lymph nodes ranging in size from mm to 1.3 cm and a subcarnial lymph node that measured 1.5 x 1.5 x 3.5 cm.    A CT scan of the abdomen and pelvis with contrast on 9/12/2018 documented a 4.9 x 4 x 4 cm soft tissue mass with peripheral calcification immediately adjacent to the gallbladder in the head of the pancreas area.    An MRI of the abdomen and pelvis W & WO on 10/11/2018 identified in the 4.1 x 3.4 cm soft tissue mass in the subhepatic space, abutting the second portion of the duodenum with mild displacement of the duodenum. There does not appear to be involvement of the pancreas, and the pancreas appears within normal limits.  Differential diagnoses include a desmoid tumor, less likely leiomyoma of the wall of the duodenum or malignancy.   Dr. Michele requested a CT-guided biopsy of the right upper lobe mass.   Dr. Rosales, the radiologist, evaluated the area with a repeat CT scan of the chest on 10/11/2018.   He spoke with Dr. Michele indicating that he would not be able to perform the biopsy because the tumor was not accessible, it was under the scapula , which blocked access and therefore the biopsy procedure was canceled.    On the CT scan of the chest on 10/11/2018 measurements of the RUL lung mass was 5.7 x 3.2 cm with irregular margins suspicious for a primary lung neoplasm. Soft tissue associated with the tumor appeared to extend into the bronchus  supplying this portion of the RUL of the lung.   Dr. Roasles indicated that the mass had an endobronchial component and should be easily assessable via bronchoscopy.    The chest CT also included a portion of the upper abdomen where a soft tissue mass was described in an addendum report. In the subhepatic space measuring 4.0 x 3.9 cm, displacing the second portion of the duodenum medially.  A referral was made to Dr. Becerra for consideration for bronchoscopy for biopsy.    On 10/24/2018, Dr. Gareth Becerra performed a bronchoscopy with EBUS guided biopsy of a 3 cm subcarinal lymph node along with bronchoalveolar lavage of the posterior segment of the RUL of the lung.  The final pathology of the station 7 lymph node only revealed a background of small mature lymphocytes with clusters of histiocytes suggestive of granuloma.  Negative for malignant cells.  Kinyoun and GMS stains were negative for AFB and fungal organisms respectively.  The bronchial washings were negative for malignant cells as well.     Mr. Prater was referred to Dr. Meenakshi Polanco at [facility] in [place], [place], for navigational bronchoscopy and biopsy under ultrasound.    On 11/27/2018 Dr. Meenakshi Polanco performed a bronchoscopy, navigational protocol, endobronchial ultrasound and biopsy of the RUL of the lung mass along with multiple lymph node station Biopsies.  Pathology revealed the following:  Poorly differentiated Adenocarcinoma from the RUL of the lung mass on biopsy and bronchoalveolar lavage consistent with a lung primary.   All lymph nodes sampled were NEGATIVE for malignant cells including stations 10L, 4L, station 7, 10R, 4R.   IHC studies were positive for CK7, TTF-1 and Napsin A.   IHC studies on tumor cells were negative for CDX2, CK5/6, and p63   Further lung profile molecular testing is pending    All of the above was discussed at length with Mr. Prater at his 12/13/2018 clinic visit.   He was agreeable for referral  for consideration of resection of the lung lesion.     He is comfortable with and would like a referral to Dr. Ulysses Bardales ([phone]) at [facility], [facility], [address], [place] [address].  Logistics, however, are planning a big part in his decision making and he may decide to have surgery done in the [place] area.    If he decides to have surgery in the [place], referral will be made to Dr. Frandy Patel.     CT chest with contrast 2/18/2019 at [facility] compared to 9/12/2018 revealed a 4.9 x 3.5 cm spiculated RUL lung mass which actually decreased in size from a prior value of 6.1 x 3.6 cm.   Subhepatic mass adjacent to the descending duodenum had increased in size significantly to 4.3 x 9 cm compared to 4.1 x 3.4 cm on the 10/11/18 MRI of the abdomen.    CT of the abdomen and pelvis without contrast on 3/20/2019 at [facility] was compared to outpatient CT data and pelvis on 9/12/2018 an MRI of the abdomen from 10/11/2018. A soft tissue mass was again encountered in the subhepatic space which abutted the distal stomach and proximal duodenum measuring 8.9 x 5.4 cm which has increased considerably from a prior measurement of 4.1 x 3.4 cm. Medial to the left renal hilum a 3.5 cm lobular mass causing some mild left-sided hydronephrosis.    Mr. Prater was referred to Dr. Frandy Patel who saw him on 1/22/2019 anticipating plans for a curative resection.     Dr. Michele received a phone call on 2/20/2019 from Dr. Patel , indicating that the previously documented an abdominal pancreatic area mass had doubled in size and was causing compression into the left kidney/renal pelvis producing a hydroureter.     Dr. Patel arranged a referral to Dr. Sylvester who will be placing a stent 3/8/2019.    Mr. Prater was scheduled to be seen by gastroenterology at [facility] on 3/13/2019 for EGD/EUS assessment and biopsy of the enlarging peripancreatic/duodenal area mass.  With a decrease in the lung mass and  increased size of the subhepatic mass-surgical resection was abandoned at present pending further evaluation of the subhepatic mass.  Dr. Michele discussed treatment options with the unresectable lung mass and the per he pancreatic mass and recommended a combination of Keytruda, Alimta, carboplatin.  He was subsequently admitted to [facility] 4/26 - 4/30/2019 with abdominal pain and melena. WBC pinky was 0.75 with ANC 0.34. PLT did drop to 24,000. He did have duodenal ulcerations that were bleeding on endoscopy. He was stabilized but then readmitted from 5/8 - 5/10/2019 with recurrent melanoma. A repeat endoscopy was performed. It was felt that exacerbation of his bleeding from the duodenal came about by his thrombocytopenia from treatment. Subsequent dosing was adjusted and Neulasta was added.    CT chest without contrast at [facility] on 6/27/2019 was compared to 2/18/2019 revealed that the right lung mass that extended into the right hilum has decreased from 5.2 x 3.5 down to 4.6 x 3.6. There is increased central cavitation in the mass consistent with tumor necrosis. Mediastinal lymphadenopathy is relatively stable.    CT abdomen and pelvis without contrast at [facility] on 6/27/2019 was compared to 4/26/2019 revealed complete resolution of the previously identified duodenal/subhepatic space soft tissue mass. The previously identified heterogenous enhancing lesion in the left renal pelvis was much less prominent but there does continue to be some mild left caliectasis.    I reviewed the CT results with Dr. Michele on 7/5/2019 who then relayed them as well to Karoline. We've had excellent results from this combination of therapy.    He completed the fourth combination therapy of Keytruda, carbo, Alimta on 7/5/2019 and then transitioned to maintenance Keytruda plus Alimta.    CT chest without contrast at [facility] on 1/9/2020 was compared to 10/17/2019 revealing:   A stable spiculated RUL nodule/mass with similar mediastinal adenopathy.    Questionable right hilar adenopathy with diminished assessment due to lack of IV contrast.   Advanced emphysema with severe pulmonary fibrosis.   No new pulmonary nodules.    CT abdomen and pelvis without contrast at [facility] on 1/9/2020 was compared to 10/17/2019 revealing:   Mildly prominent aortocaval RP lymph nodes with position just below the level of the renal vein worrisome for potential lymphatic metastases. This is similar to 10/17/2019 but increased from 4/26/2019.   Pneumobilia without discrete suspicious focal lesion in the liver.   Wall thickening of the duodenum.   Similar and stable renal lesions favoring cysts.    He has had numerous endoscopies within the past year.   While he was having an Endo EUS and had cardiac issues and was actually admitted to the intensive care unit.     CT abdomen and pelvis without contrast on 7/16/2020 at [facility] was compared to 1/9/2020 and documented:  1.5 x 0.9 cm aortocaval lymph node, previously 1.9 x 1.2 cm  No new abnormality seen    CT chest without contrast on 11/12/2020 at [facility] ws compared to 1/9/2020 and documented:  Mixed response therapy with interval decrease in size in the RIGHT upper lobe pulmonary nodule but increase in size and mediastinal lymph nodes.  In addition, there is severe emphysema with findings of severe pulmonary fibrosis. Interval worsening of interstitial opacities bilaterally as well as suggestion of some pleural nodularity. Lymphangitic spread of malignancy should be considered.  Small pleural effusion on the RIGHT is new    CT abdomen and pelvis without contrast on 11/12/2020 at [facility] was compared to 7/16/2020 and documented:  The aortic caval node described on the comparison study is not significantly changed in size when measured at the same location.  Nonobstructing renal calculus on the LEFT. No change in the indeterminate renal lesions on the LEFT, likely cysts.  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   Fahad has benign prostatic hypertrophy (BPH) that is stable on Flomax.   Orthostatic hypotension resolved with adjustment of metoprolol by Dr. Alexis   He takes Tambocor, metoprolol without new cardiac issues.   Protonix continues without any new exacerbations of GERD.   Lower extremity edema overall has actually improved to some degree.    Fahad received his final cycle #25 of Keytruda/Alimta on 10/29/2020.   Treatment was held since that time due to issues of dyspnea and shortness of breath.    A course of steroids (prednisone) was initiated and antibiotics also delivered and although his respiratory issues improved, he has continued to have issues with weight loss.    CXR on 12/17/2020 documented no interval change, but in my personal review of the x-ray, there is significant pulmonary fibrosis not significantly changed compared to the x-ray from October 2020.    I discussed the findings on the chest x-ray and the comparison at his clinic visit on 12/30/2020. He does not appear to be improving but rather quite stable.  Vianey has pulmonary fibrosis as documented on a CT scan of the chest without contrast at [facility] on 11/12/2020.  Fahad is in agreement to go on a chemotherapy holiday with a repeat CT scan of the chest in 3 months and monitor his situation clinically and radiographically without systemic intervention going forward.    CT CHEST WO contrast on 3/17/2021, compared to 11/12/2020 at [facility] documented:   Mixed response therapy with interval decrease in size in the RIGHT upper lobe pulmonary nodule but increase in size and mediastinal lymph nodes.  Slight interval decrease in size in the RIGHT upper lobe nodule/mass measuring 4.7 x 2.2 cm today, previously 5.0 x 2.4 cm.  RIGHT apical nodular density measuring up to 0.6 cm, previously 0.4 cm.  In addition, there is severe emphysema with findings of severe pulmonary fibrosis. Interval worsening of interstitial opacities bilaterally as well as suggestion of some pleural nodularity. Lymphangitic spread of malignancy should be considered.  Small pleural effusion on the RIGHT is new.    CT ABD & PELVIS WO contrast on 3/17/2021, compared to 11/12/2020, at [facility] documented:  Nonobstructing calculus lower pole left kidney  Low-density lesions associated with the left renal contour probably proteinaceous cyst these are unchanged  Chronic right lateral pleural complex in the lung base with bilateral small basilar pneumothoraces..     XR CHEST (2 VW) 4/21/2021 at [facility] , compared to December 17, 2020, documented:  Advanced interstitial fibrotic changes noted throughout the pulmonary parenchyma unchanged from December 17, 2020.  Small to moderate right lateral pleural complex. This may be pleural fluid or thickening is unchanged December 17, 2020.    XR CHEST (2 VW) 6/16/2021:  2.5 cm area of residual nodularity versus scarring in the RUL zone.  Probable small pleural effusions.  Bilateral interstitial infiltrates stable compared to the 2 most recent studies but increased compared to the 2019 study.   There may beunderlying pulmonary fibrosis that is worsening.   Pulmonary edema is not ruled out.     CT CHEST WO CONTRAST DIAGNOSTIC at [facility]- 11/15/2021:Comparison: CT chest without contrast 8/25/2021  3.0 x 2.2 cm spiculated mass in the right upper lobe, stable  There is pleural thickening lateral to the mass with bands of probable fibrosis, stable   4 mm RUL there is a small stellate shaped nodule, previously 5 mm  3 mm nodule in the right lower lobe posterior to the major fissure, unchanged  There is a calcified granuloma in the medial basal segment left lower lobe   Small calcified pleural plaques in the upper left hemithorax as before.  There are  several partially calcified mediastinal lymph nodes which appears stable  There is increased pleural thickening along the anterior margin of the right lower lobe.     CT ABDOMEN PELVIS WO CONTRAST at [facility]- 11/15/2021:Comparison: CT abdomen pelvis without contrast 3/17/2021  Review of lung windows demonstrates extensive reticular interstitial fibrosis in the lower lung zones, as well as loculated pleural fluid in the right lateral lung base with pleural thickening   10 mm.hyperattenuating exophytic nodule at the inferior pole the left kidney   7 mm nephrolithiasis at the inferior pole the left kidney.  The prostate gland is enlarged  There is grade 1 spondylolisthesis at L5-S1 with disc space collapse. This is stable    XR CHEST (2 VW) on 3/3/2022 at [facility], compared to 8/16/21, documented:  A 1.7 cm opacity in the right midlung zone appears smaller on the current study, previously measuring 2.5 cm.  Stable blunting of the costophrenic angles right greater than left.  Coarse interstitial lung disease appears relatively stable. Probable interstitial fibrosis    X-RAYS OF THE CHEST at [facility] on 6/29/2022:  The lungs show moderately severe fibrosis and COPD with pleural reaction blunting the right costophrenic sulcus  Small area consolidation noted right upper lobe    X-RAYS OF THE CHEST at [facility] on 9/28/2022:  The right-sided MediPort tip overlying the projection of the superior vena cava  The aorta is calcified and tortuous  The heart is enlarged in size.   There are diffuse interstitial opacities throughout the parenchyma bilaterally with a small right effusion.   There is a nodular density in the right upper lung field.    The RUL abnormality on the chest x-ray from today is likely residual scarring noted on the previous CT scan from November 2021.    CT chest without contrast at [facility] on 11/16/2022:COMPARISON: 11/15/2021, 8/25/2021  9 mm RIGHT upper lobe pulmonary nodule, previously 4 mm  3.0 x 1.9 cm spiculated mass in the  RIGHT upper lobe abutting the major minor fissures, unchanged  Chronic interstitial thickening, bronchiectasis, and interstitial fibrosis is similar to multiple prior studies.   Small RIGHT pleural effusion.   No change in a few borderline prominent partially calcified mediastinal lymph nodes    CT ABDOMEN PELVIS WO CONTRAST at [facility] on 11/16/2022:COMPARISON: 11/15/2021   There is mild cardiomegaly with mitral annulus calcifications and coronary artery calcification  Trace pericardial effusion or pericardial thickening is present.  There is interstitial fibrosis within the lung bases with honeycombing.   There is a loculated effusion within the right lateral costophrenic angle stable from the previous exam  There are cysts involving the left kidney.   More complex exophytic lesions involving the posterior midpole and the left lower pole may represent hemorrhagic cysts.  Anterolisthesis of L4 on L5 and L5 on S1 is present suggesting subluxation at the level of the facets at L4-L5  The prostate gland is mildly enlarged    CT CHEST WO on 11/9/2023 at [facility], compared to 11/16/22  multiple mediastinal lymph nodes, most of which are partially calcified which appear similar in size and number since the prior study. The largest measure up to 1.3 cm short axis subcarinal region and 1.2 cm short axis AP window  2.6 x 1.9 cm anterior right apical nodular consolidation, significantly increased in size, previously 0.9 cm.  3.6 x 1.9 cm Spiculated nodule right upper lobe, unchanged in size  1.2 x 1.1 cm additional perifissural opacity along the anterior minor fissure   Emphysema, bronchial thickening and peripheral reticular opacities present throughout both lungs. Stable emphysema.  Trace amount of pleural fluid lateral right lung base  Degenerative changes present in the spine. No discrete osseous lesion detected    CT ABDOMEN PELVIS WO on 11/9/23 at [facility], compared to 1/5/23  Pneumobilia, increased from prior. This may be seen  following sphincterotomy or other hepatobiliary procedures. Recommend correlation with operative history and hepatic enzyme levels. No portal venous gas or bowel pneumatosis  Nonobstructing let nephrolithiasis and unchanged left renal cysts  Moderate quantity of stool throughout the colon    PET CT SKULL BASE TO MID THIGH on 12/13/23, compared to CT 11/08/23  2.6 x 1.9 cm anterior right apical nodular consolidation, SUV 14.8, consistent with malignancy until proven otherwise  3.6 x 1.9 cm RUL spiculated nodule, SUV 5.34, consistent with increased likelihood of malignancy  No additional significantly FDG avid nodules are seen within the lung fields   Right hilar lymph node SUV 3.90  Right suprahilar lymph node SUV 4.26  Enlarged subcarinal node SUV 2.58  Nonenlarged precarinal node SUV 2.26  AP window node SUV 2.82  Additional smaller/less FDG avid nodes not individually described  In the head and neck, the included portions of the orbits and paranasal sinuses demonstrate normal levels of activity   The examination demonstrates a generally physiologic distribution of activity in the abdomen and pelvis   The examination demonstrates a generally physiologic distribution of activity in the included portions of the skeleton and extremeties     Fahad continues to have chronic dyspnea on exertion associated with pulmonary fibrosis from smoking history 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, but still at baseline without exacerbations.    Imaging studies with CT scan of the chest on 11/9/2023 and a follow-up PET scan on 12/7/2023 documented progressive disease in the RUL of the lung.  He was referred to Dr. Danny Cool at [facility] for SBRT.    SBRT by Dr. Danny Cool was delivered in 5 treatment fractions for a total dose of 5000 cGy. XRT was initiated 1/24/2024 and was completed on 2/7/2024    CT CHEST WO CONTRAST AT [facility] on 3/20/2024: Compared to 11/08/2023  1.3 cm partially calcified subcarinal lymph node,  unchanged.   2.0 x 1.5 x 1.9 cm right upper lobe mass, previously 2.0 x 2.5 x 1.9 cm   1.8 x 3.0 x 2.0 right upper lobe mass, previously 3.5 x 1.9 x 1.6 cm   Emphysematous changes with subpleural reticulations and possible honeycombing and fibrosis are noted with pleural thickening again observed along the right lateral chest wall extending to the diaphragmatic surface.   There are scattered subpleural calcifications     CT ABDOMEN PELVIS WO CONTRAST AT [facility] on 3/20/2024: compared to 11/08/2023  No evidence of metastasis in the abdomen/pelvis.   Multiple left renal cysts. Nonobstructing 4 mm stone at the left lower pole   Mild colonic diverticulosis   No aggressive osseous lesion identified   Multilevel degenerative spondylosis and mild dextroconvex scoliosis. Grade 1 anterolisthesis at L4-L5 and L5-S1. Right unilateral L5 pars defect.    TREATMENT SUMMARY  Keytruda, carboplatin, Alimta initiated 4/18/2019 to be given every 3 weeks for 4 cycles - 4th cycle 7/5/2019, then Keytruda + Alimta as maintenance to continue indefinitely until progression or intolerable side effects   Fahad received cycle #25 of Keytruda/Alimta on 10/29/2020. He was then placed on an indefinite chemotherapy holiday on 12/30/2020  SBRT by Dr. Danny Cool was delivered in 5 treatment fractions for a total dose of 5000 cGy. XRT was initiated 1/24/2024 and was completed on 2/7/2024          #2   TUMOR HISTORY: Pancreatic mass diagnosed 10/29/03  Mr. Prater presented in October 2003 with obstructive jaundice.   A CT scan of the abdomen on 10/26/2003 documented a 3.2 x 4 x 4.2 cm mass in the head of the pancreas.     On 10/29/03 Dr. Alexis performed a resection of a portion of the head of the pancreas on Fahad Prater along with a Klaudia-en-Y procedure and a choledochojejunostomy for what was felt to be a pancreatic cancer. His pancreas was bypassed because of the findings.  Pathology of the biopsies were negative for malignancy showing a fibrosed  pancreas.   Mr. Prater was referred to Dr. Vishal High in [place].    Dr. Vishal High performed ERCP with an EUS and biopsy on 02/26/04   Pathology again was negative for cancer or malignancy.   Routine periodic serologic and radiographic monitoring has not disclosed progression of the mass or development of new malignancy or increase in pancreatic tumor markers.     A CT scan of the abdomen and pelvis with contrast on 9/12/2018 documented a 4.9 x 4 x 4 cm soft tissue mass with peripheral calcification immediately adjacent to the gallbladder in the head of the pancreas area.    An MRI of the abdomen and pelvis W & WO on 10/11/2018 identified in the 4.1 x 3.4 cm soft tissue mass in the subhepatic space, abutting the second portion of the duodenum with mild displacement of the duodenum. There does not appear to be involvement of the pancreas, and the pancreas appears within normal limits.  Differential diagnoses include a desmoid tumor, less likely leiomyoma of the wall of the duodenum or malignancy.    CT of the abdomen and pelvis without contrast on 3/20/2019 at [facility] was compared to outpatient CT data and pelvis on 9/12/2018 an MRI of the abdomen from 10/11/2018. A soft tissue mass was again encountered in the subhepatic space which abutted the distal stomach and proximal duodenum measuring 8.9 x 5.4 cm which has increased considerably from a prior measurement of 4.1 x 3.4 cm. Medial to the left renal hilum a 3.5 cm lobular mass causing some mild left-sided hydronephrosis.    Mr. Prater was scheduled for an EGD/EUS by Dr. Bryson Moe as an outpatient procedure on 3/13/2019 for assessment and biopsy of the enlarging peripancreatic/duodenal area mass. Unfortunately, after initiation of the procedure, he began having ventricular tachycardia and the procedure had to be aborted. Karoline was transferred to the ICU for cardiology evaluation and stabilization. He remained hospitalized until 3/18/2019 when he was medically  cleared for discharge.    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 No emergent urologic intervention was warranted and he received monitoring of renal function. He had a creatinine level of 1.9 at discharge.    PET scan on 4/2/2019 identified a soft tissue mass in the RUL measuring 1.2 x 3.5 cm with extension to the right anterior hilum with an SUV of 10.1. Evidence of mediastinal and hilar lymphadenopathy, with low-level metabolic activity. Interval increase in the size of a large mass in the right upper abdomen inseparable from the duodenum measuring 10.7 x 6.9 cm compared to 5.4 x 8.9 cm on 2/20/2019 with an SUV of 23.6. Slight increase in 2 small inseparable masses medial to the hilum of the left kidney measuring 2.2 and 2.6 cm and the other measuring 3.9 cm with a maximum SUV of 18.6.    Cycle #1 of palliative chemotherapy with Carboplatin, Alimta and Keytruda was initiated 4/18/19 which should also cover this process.    Abdominal/pelvic CT 4/26/19 was performed at [facility] due to abdominal pain and melena. The RUQ mass, within the duodenum decreased to 6.8 x 6.2 cm (was 10.7 x 6.9 cm on PET 4/2/19)    CT abdomen and pelvis without contrast at [facility] on 6/27/2019 was compared to 4/26/2019 revealed complete resolution of the previously identified. Duodenal/subhepatic space soft tissue mass. The previously identified heterogenous enhancing lesion in the left renal pelvis was much less prominent but there does continue to be some mild left caliectasis.      CT abdomen and pelvis without contrast at [facility] on 1/9/2020 was compared to 10/17/2019 revealing:   Mildly prominent aortocaval RP lymph nodes with position just below the level of the renal vein worrisome for potential lymphatic metastases. This is similar to 10/17/2019 but increased from 4/26/2019.   Pneumobilia without discrete suspicious focal lesion  in the liver.   Wall thickening of the duodenum.   Similar and stable renal lesions favoring cysts.    CT ABD & PELVIS WO contrast on 3/17/2021, compared to 11/12/2020, at [facility] documented:  Nonobstructing calculus lower pole left kidney  Low-density lesions associated with the left renal contour probably proteinaceous cyst these are unchanged  Chronic right lateral pleural complex in the lung base with bilateral small basilar pneumothoraces..     He has had numerous endoscopies within the past year. When he was having an Endo EUS he had cardiac issues and required to the intensive care unit.     This area will just be monitored on follow-up scans without further elective endoscopic surveillance..    Mr. Prater requests that imaging studies only be done yearly.     CT ABDOMEN PELVIS WO CONTRAST at [facility]- 11/15/2021:Comparison: CT abdomen pelvis without contrast 3/17/2021  Review of lung windows demonstrates extensive reticular interstitial fibrosis in the lower lung zones, as well as loculated pleural fluid in the right lateral lung base with pleural thickening   10 mm.hyperattenuating exophytic nodule at the inferior pole the left kidney   7 mm nephrolithiasis at the inferior pole the left kidney.  The prostate gland is enlarged  There is grade 1 spondylolisthesis at L5-S1 with disc space collapse. This is stable     CT ABDOMEN PELVIS WO CONTRAST at [facility] on 11/16/2022:COMPARISON: 11/15/2021   There is mild cardiomegaly with mitral annulus calcifications and coronary artery calcification  Trace pericardial effusion or pericardial thickening is present.  There is interstitial fibrosis within the lung bases with honeycombing.   There is a loculated effusion within the right lateral costophrenic angle stable from the previous exam  There are cysts involving the left kidney.   More complex exophytic lesions involving the posterior midpole and the left lower pole may represent hemorrhagic cysts.  Anterolisthesis of L4 on L5  and L5 on S1 is present suggesting subluxation at the level of the facets at L4-L5  The prostate gland is mildly enlarged    CT ABDOMEN PELVIS WO on 11/9/23 at [facility], compared to 1/5/23  Pneumobilia, increased from prior. This may be seen following sphincterotomy or other hepatobiliary procedures. Recommend correlation with operative history and hepatic enzyme levels. No portal venous gas or bowel pneumatosis  Nonobstructing let nephrolithiasis and unchanged left renal cysts  Moderate quantity of stool throughout the colon    CT CHEST WO CONTRAST AT [facility] on 3/20/2024: Compared to 11/08/2023  1.3 cm partially calcified subcarinal lymph node, unchanged.   2.0 x 1.5 x 1.9 cm right upper lobe mass, previously 2.0 x 2.5 x 1.9 cm   1.8 x 3.0 x 2.0 right upper lobe mass, previously 3.5 x 1.9 x 1.6 cm   Emphysematous changes with subpleural reticulations and possible honeycombing and fibrosis are noted with pleural thickening again observed along the right lateral chest wall extending to the diaphragmatic surface.   There are scattered subpleural calcifications     CT ABDOMEN PELVIS WO CONTRAST AT [facility] on 3/20/2024: compared to 11/08/2023  No evidence of metastasis in the abdomen/pelvis.   Multiple left renal cysts. Nonobstructing 4 mm stone at the left lower pole   Mild colonic diverticulosis   No aggressive osseous lesion identified   Multilevel degenerative spondylosis and mild dextroconvex scoliosis. Grade 1 anterolisthesis at L4-L5 and L5-S1. Right unilateral L5 pars defect.      #1   HEMATOLOGICAL HISTORY: IgG kappa MGUS- Dx: 02/23/06.  During the course of Mr. Prater’s follow up, an abnormally elevated protein was noted on one occasion, which led to workup including quantitative immunoglobulins.     An elevated IgG kappa was encountered. This has remained stable in the 2500 range since early 2006.     A bone marrow biopsy and aspirate on 02/23/06 was negative with only 4% plasma cells.     A diagnosis of IgG kappa MGUS  was made.     24 hour urine for immunoelectrophoresis did not reveal an M-spike.  Serology studies on 9/18/2018 documented an elevated, stable IgG of 2589 with an M spike of 0.7.   Immunofixation continued to reveal IgG monoclonal protein with kappa light chain specificity.      #2   HEMATOLOGICAL HISTORY: Iron deficiency anemia, 9/18/2018  Karoline had serology on 9/18/2018 that identified iron deficiency anemia.   His lab work was obtained at [facility].  An iron level was 12, iron saturation 7%, ferritin 256  Folate >20, and vitamin B12 1044.   Dr. Li placed Karoline on ferrous sulfate 325 mg daily on 9/25/2018 , but this failed to resolve the anemia.    An EGD by Dr. Randy Somers on 12/11/2018 documented a normal esophagus, normal stomach and a degree of duodenal deformity.  Gastric biopsy was urease negative.    Colonoscopy by Dr. Randy Somers on 1/9/2019 documented a polyp at 80 cm.  Pathology identified a tubular adenoma, negative for high-grade dysplasia.   Feraheme administered.    Labs on 3/13/2019:  Iron 25   Iron saturation 22%   TIBC 116   Ferritin >2000   Reticulocyte count 0.0578      Haptoglobin 341   Folate >20   Vitamin B12 945   Erythropoietin 13    He presented to [facility] on 4/26/2019 with melena and abdominal discomfort. He was subsequently admitted    EGD per Dr. Jj on 4/29/2019 revealed  LA grade (1 or more mucosal breaks greater than 5 mm, not extending between the tops of the 2 mucosal folds)?esophagitis with no bleeding found in the distal esophagus   Stomach was normal, biopsies for H. pylori taken.   Cardia and gastric fundus were normal on retroflexion   One nonbleeding cratered duodenal ulcer with no stigmata of bleeding was found in the duodenal bulb lesion was about 9 mm.   Many nonbleeding cratered, linear and superficial duodenal ulcers with no stigmata of bleeding were found in the second portion of the duodenum. The largest lesion was 6 mm in largest  dimension. No mass encountered and anastomosis not seen.    He was admitted to [facility] on 5/8/2019 with melena, hematochezia, anemia, thrombocytopenia    Endoscopy performed by Dr. Bansal on 5/9/2019 revealed  Normal esophagus   Gastric mucosal variant. 2 erythematous spots in the antrum, ? AVM   Normal examined duodenum   Nothing found to account for symptoms   He developed pancytopenia from chemotherapy and did require transfusions.  His hemoglobin dropped to 7.3 on 6/25/2019 after his last treatment. He received 2 units packed red blood cells as an outpatient.    Serology 6/13/2019  Serum Fe - 117  TIBC - 587  Fe sat - 19.9%  Ferritin - 1,000  Iron levels have been adequately replaced. 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    TREATMENT SUMMARY  Feraheme 510 mg IV on 2/14 and 2/21/19   Venofer 200 mg IV daily 5/8 - 5/10/2019 as IP at [facility]   s/p 2 units pRBC on 4/26/19 and 2 units pRBC on 4/29/2019 as IP at [facility]?  s/p 2 units pRBC on?5/8/2019   s/p 2 pheresis plts on 5/8/2019  s/p 2 units pRBC as OP 6/26/2019          Past Medical History:    Past Medical History:   Diagnosis Date    Arthritis     Atrial fibrillation with rapid ventricular response 05/31/2019    Blindness of left eye     BPH with obstruction/lower urinary tract symptoms     Cancer     stomach & lung    CHF (congestive heart failure)     CKD (chronic kidney disease) stage 3, GFR 30-59 ml/min     COPD with acute exacerbation 08/03/2024    Coronary artery disease     Elevated cholesterol     Essential hypertension 10/02/2017    Fibrotic lung diseases     GERD (gastroesophageal reflux disease)     Hearing loss     History of transfusion     Hydronephrosis of left kidney     Hyperlipidemia     Hypertension     pt was taken off of all of his medications for BP (atenolol, lisinopril, lasix) because his BP kept bottoming out so his primary dr told him to  discontinue them 1-2 months ago (Jan/Feb 2019). pt states he takes no medications currently.    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 Patel is just going to keep an eye on it and not do surgery right now.    Mediastinal adenopathy 10/24/2018    Station 7    Monoclonal gammopathy of unknown significance (MGUS) 09/11/2018    Pancreatic mass     pt states he had this in 2013 but it went away on its own. Now recent CT shows it has come back so he is going to have an ultrasound on 3/13/19.    Shortness of breath        Past Surgical History:    Past Surgical History:   Procedure Laterality Date    ABDOMINAL SURGERY      BRONCHOSCOPY N/A 10/24/2018    Procedure: BRONCHOSCOPY WITH BIOPSY, EBUS;  Surgeon: Gareth Becerra MD;  Location: [facility] OR;  Service: Pulmonary    CHOLECYSTECTOMY      COLONOSCOPY N/A 1/3/2019    Procedure: COLONOSCOPY WITH ANESTHESIA;  Surgeon: Randy Somers DO;  Location: [facility] ENDOSCOPY;  Service: Gastroenterology    CYSTOSCOPY, RETROGRADE PYELOGRAM AND STENT INSERTION Left 3/8/2019    Procedure: CYSTOSCOPY RETROGRADE BILATERAL PYELOGRAM;  Surgeon: Jos Sylvester MD;  Location: [facility] OR;  Service: Urology    ENDOSCOPY N/A 12/11/2018    Procedure: ESOPHAGOGASTRODUODENOSCOPY WITH ANESTHESIA;  Surgeon: Randy Somers DO;  Location: [facility] ENDOSCOPY;  Service: Gastroenterology    ENDOSCOPY N/A 4/29/2019    Procedure: ESOPHAGOGASTRODUODENOSCOPY WITH ANESTHESIA;  Surgeon: Lilliam Jj MD;  Location: [facility] OR;  Service: Gastroenterology    ENDOSCOPY N/A 5/9/2019    Procedure: ESOPHAGOGASTRODUODENOSCOPY WITH ANESTHESIA;  Surgeon: Pilo Bansal MD;  Location: [facility] ENDOSCOPY;  Service: Gastroenterology    EYE SURGERY Left 1964    FOOT SURGERY Right 1966    joint    FRACTURE SURGERY      PACEMAKER IMPLANTATION Left  2024    Procedure: Leadless pacemaker implant and AVN ablation;  Surgeon: Carlitos Bowen MD;  Location: [facility] CATH INVASIVE LOCATION;  Service: Cardiovascular;  Laterality: Left;       Social History:    Social History     Socioeconomic History    Marital status: Single   Tobacco Use    Smoking status: Former     Current packs/day: 0.00     Types: Cigarettes     Start date: 10/29/1954     Quit date: 10/29/2003     Years since quittin.9    Smokeless tobacco: Former     Types: Chew     Quit date:    Vaping Use    Vaping status: Never Used   Substance and Sexual Activity    Alcohol use: No    Drug use: No    Sexual activity: Defer       Family History:   Family History   Problem Relation Age of Onset    Hypertension Mother     Leukemia Father        Current Hospital Medications:      Current Facility-Administered Medications:     acetaminophen (TYLENOL) tablet 650 mg, 650 mg, Oral, Q4H PRN **OR** acetaminophen (TYLENOL) 160 MG/5ML oral solution 650 mg, 650 mg, Oral, Q4H PRN **OR** acetaminophen (TYLENOL) suppository 650 mg, 650 mg, Rectal, Q4H PRN, Wilber Carbajal MD    acetaminophen (TYLENOL) tablet 1,000 mg, 1,000 mg, Oral, Nightly, Wilber Carbajal MD, 1,000 mg at 10/05/24 2115    aspirin EC tablet 81 mg, 81 mg, Oral, Daily, Wilber Carbajal MD, 81 mg at 10/06/24 0852    atorvastatin (LIPITOR) tablet 20 mg, 20 mg, Oral, Nightly, Wilber Carbajal MD, 20 mg at 10/05/24 2115    sennosides-docusate (PERICOLACE) 8.6-50 MG per tablet 2 tablet, 2 tablet, Oral, BID, 2 tablet at 10/06/24 0852 **AND** polyethylene glycol (MIRALAX) packet 17 g, 17 g, Oral, Daily PRN **AND** bisacodyl (DULCOLAX) EC tablet 5 mg, 5 mg, Oral, Daily PRN **AND** bisacodyl (DULCOLAX) suppository 10 mg, 10 mg, Rectal, Daily PRN, Grace Aly APRN    budesonide-formoterol (SYMBICORT) 160-4.5 MCG/ACT inhaler 2 puff, 2 puff, Inhalation, BID - RT, Wilber Carbajal MD, 2 puff at 10/06/24 5282     busPIRone (BUSPAR) tablet 5 mg, 5 mg, Oral, TID With Meals, Grace Ayl APRN, 5 mg at 10/06/24 1138    cetirizine (zyrTEC) tablet 10 mg, 10 mg, Oral, Daily, Tarik Crocker MD, 10 mg at 10/06/24 0852    dextromethorphan polistirex ER (DELSYM) 30 MG/5ML oral suspension 60 mg, 60 mg, Oral, Q12H PRN, Wilber Carbajal MD    diphenhydrAMINE (BENADRYL) capsule 25 mg, 25 mg, Oral, Nightly PRN, Wilber Carbajal MD, 25 mg at 09/22/24 2149    empagliflozin (JARDIANCE) tablet 10 mg, 10 mg, Oral, Daily, Irving Power DO, 10 mg at 10/06/24 0852    fluticasone (FLONASE) 50 MCG/ACT nasal spray 2 spray, 2 spray, Each Nare, Daily, Tarik Crocker MD, 2 spray at 10/06/24 0851    guaiFENesin (MUCINEX) 12 hr tablet 600 mg, 600 mg, Oral, Q12H, Wilber Carbajal MD, 600 mg at 10/06/24 0852    HYDROcodone-acetaminophen (NORCO) 5-325 MG per tablet 1 tablet, 1 tablet, Oral, TID, Grace Aly APRN, 1 tablet at 10/06/24 0852    Hydrocort-Pramoxine (Perianal) (PROCTOFOAM-HS) 1-1 % rectal foam 1 Application, 1 Application, Rectal, Daily, Wilber Carbajal MD, 1 Application at 10/06/24 0851    ipratropium-albuterol (DUO-NEB) nebulizer solution 3 mL, 3 mL, Nebulization, 4x Daily - RT, Nitin, MARITZA Mcguire, 3 mL at 10/06/24 1021    isosorbide mononitrate (IMDUR) 24 hr tablet 30 mg, 30 mg, Oral, Daily Before Lunch, Wilber Carbajal MD, 30 mg at 10/06/24 1138    melatonin tablet 10 mg, 10 mg, Oral, Nightly, Wilber Carbajal MD, 10 mg at 10/05/24 2115    metoprolol succinate XL (TOPROL-XL) 24 hr tablet 25 mg, 25 mg, Oral, Daily, Wilber Carbajal MD, 25 mg at 10/06/24 0852    nitroglycerin (NITROSTAT) SL tablet 0.4 mg, 0.4 mg, Sublingual, Q5 Min PRN, Wilber Carbajal MD    pantoprazole (PROTONIX) EC tablet 40 mg, 40 mg, Oral, Daily, Wilber Carbajal MD, 40 mg at 10/06/24 0852    predniSONE (DELTASONE) tablet 40 mg, 40 mg, Oral, Daily With Breakfast,  Cornelio Gordon DO, 40 mg at 10/06/24 0852    sodium bicarbonate tablet 650 mg, 650 mg, Oral, TID, Wilber Carbajal MD, 650 mg at 10/06/24 0852    sodium chloride 0.9 % flush 10 mL, 10 mL, Intravenous, PRN, Wilber Carbajal MD, 10 mL at 09/23/24 0632    sodium chloride 0.9 % infusion 40 mL, 40 mL, Intravenous, PRN, Wilber Carbajal MD    sodium chloride nasal spray 2 spray, 2 spray, Each Nare, 5x Daily, Vijay James Jr., MD, 2 spray at 10/06/24 1138    sodium chloride nasal spray 2 spray, 2 spray, Each Nare, Continuous PRN, Vijay James Jr., MD    tamsulosin (FLOMAX) 24 hr capsule 0.4 mg, 0.4 mg, Oral, Nightly, Wilber Carbajal MD, 0.4 mg at 10/05/24 2116    traZODone (DESYREL) tablet 25 mg, 25 mg, Oral, Nightly PRN, SensTarik doherty MD, 25 mg at 09/25/24 2043    zolpidem (AMBIEN) tablet 2.5 mg, 2.5 mg, Oral, Nightly PRN, Billy Lebron MD, 2.5 mg at 10/04/24 2252      Allergies:   Allergies   Allergen Reactions    Penicillins Hives         Subjective   REVIEW OF SYSTEMS:   CONSTITUTIONAL: no fever, no night sweats,  fatigue;  HEENT: no blurring of vision, no double vision, no hearing difficulty, no tinnitus, no ulceration, no dysplasia, no epistaxis;  LUNGS: no cough, no hemoptysis,  wheeze,   shortness of breath;  CARDIOVASCULAR: no palpitation, no chest pain,  shortness of breath;  GI: no abdominal pain, no nausea, no vomiting, no diarrhea, no constipation;  SHELBIE: no dysuria, no hematuria, no frequency or urgency, no nephrolithiasis;  MUSCULOSKELETAL: no joint pain, no swelling, no stiffness;  ENDOCRINE: no polyuria, no polydipsia, no cold or heat intolerance;  HEMATOLOGY: no easy bruising or bleeding, no history of clotting disorder;  DERMATOLOGY: no skin rash, no eczema, no pruritus;  NEUROLOGY: no syncope, no seizures, no numbness or tingling of hands, no numbness or tingling of feet, no paresis;    Objective   /57 (BP Location: Right arm,  "Patient Position: Sitting)   Pulse 76   Temp 97.7 °F (36.5 °C) (Oral)   Resp 20   Ht 162.6 cm (64\")   Wt 71.6 kg (157 lb 14.4 oz)   SpO2 95%   BMI 27.10 kg/m²     PHYSICAL EXAM:  CONSTITUTIONAL: Alert, appropriate, no acute distress, looks ill appearing  EYES: Anicteric, EOM intact, pupils equal round   ENT: Mucous membranes moist, no oral pharyngeal lesions, external inspection of ears and nose are normal  NECK: Supple, no masses.  No palpable thyroid mass  CHEST/LUNGS: CTA bilaterally, crackles, O2 NC HF  CARDIOVASCULAR: RRR, no murmurs.  No lower extremity edema  ABDOMEN: soft nontender, active bowel sounds, no HSM.  No palpable masses  EXTREMITIES: warm, full ROM in all 4 extremities, no focal weakness.  SKIN: warm, dry with no rashes or lesions  NEUROLOGIC: follows commands, nonfocal       LABORATORY RESULTS REVIEWED BY ME:  Lab Results   Component Value Date    WBC 7.31 10/01/2024    HGB 11.1 (L) 10/01/2024    HCT 37.3 (L) 10/01/2024    MCV 95.6 10/01/2024     10/01/2024     Lab Results   Component Value Date    NEUTROABS 7.00 09/26/2024         Lab Results   Component Value Date     10/01/2024    K 4.8 10/01/2024     10/01/2024    CO2 18.0 (L) 10/01/2024    BUN 37 (H) 10/01/2024    CREATININE 1.59 (H) 10/01/2024    GLUCOSE 130 (H) 10/01/2024    CALCIUM 10.0 10/01/2024    BILITOT 0.4 09/21/2024    ALKPHOS 80 09/21/2024    AST 31 09/21/2024    ALT 34 09/21/2024    AGRATIO 1.0 09/21/2024    GLOB 2.9 09/21/2024       Lab Results   Component Value Date    INR 1.04 06/29/2024    INR 1.29 (H) 01/05/2023    INR 1.18 (H) 08/25/2021    PROTIME 14.1 06/29/2024    PROTIME 16.2 (H) 01/05/2023    PROTIME 14.1 (H) 08/25/2021       RADIOLOGY STUDIES REPORT/REVIEWED AND INTERPRETED BY ME:  CT Angiogram Chest    Result Date: 10/3/2024  Narrative: EXAM: CT ANGIOGRAM CHEST- - 10/3/2024 12:24 PM  HISTORY: hypoxia, PE eval; R09.02-Hypoxemia; J98.4-Other disorders of lung; N39.0-Urinary tract infection, " site not specified; Z74.09-Other reduced mobility; J44.9-Chronic obstructive pulmonary disease, unspecified. 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  COMPARISON: 9/21/2024.  DOSE LENGTH PRODUCT: 366.39 mGy.cm. Automatic exposure control was utilized to make radiation dose as low as reasonably achievable.  TECHNIQUE: Enhanced axial CT images obtained with multiplanar reformats. 3D postprocessing, including MIPs, performed and images saved to PACS.  FINDINGS: AIRWAYS/PULMONARY PARENCHYMA: Linear probable secretions in the trachea. Main airways patent. Diffuse small airway thickening.  RIGHT upper lobe masslike opacity measures 4.5 x 3.6 cm, concerning for malignancy. Increased patchy opacities especially in the RIGHT lung. Interlobular septal thickening throughout.  Similar RIGHT middle lobe 2 x 1 cm pulmonary nodule on axial series 7, image 67 compared to 9/21/2024.  Similar RIGHT pleural thickening. No pneumothorax or drainable pleural fluid.  VESSELS: Contrast bolus is adequate. No evidence of pulmonary embolism. Enlarged main pulmonary artery, which can be seen with pulmonary artery hypertension. Aorta normal in course and caliber.  CARDIAC: Normal heart size with scattered coronary artery calcifications. Metallic device at the RIGHT ventricular apex, likely wireless pulse generator. No pericardial effusion.   MEDIASTINUM: Similar mediastinal adenopathy including an AP window lymph node measuring 1.5 cm on axial series 5, image 46 compared to 9/21/2024.  Esophagus has normal coarse, caliber and wall thickness.  EXTRATHORACIC: The visualized portions of the thyroid gland are unremarkable. No thoracic inlet or axillary adenopathy. Port in the RIGHT chest wall, catheter tip in the mid SVC. There may be a component of chronic RIGHT jugular vein stenosis as there is opacification of collateral vessels in the chest.  INCLUDED UPPER  ABDOMEN: Pneumobilia. Otherwise visualized portion of the liver, atrophic pancreas, spleen, adrenal glands appear within normal limits. Nephrolithiasis versus contrast excretion in the kidneys.  OSSEOUS: There are mild degenerative changes of the visualized spine. No acute or suspicious bony finding.       Impression: 1. Similar masslike spiculated opacity at the RIGHT upper lobe. Similar interlobular septal thickening with increased patchy opacities especially in the RIGHT lung. Appearance highly concerning for malignancy with lymphangitic spread of tumor. 2. Similar mild adenopathy. 3. Coronary artery calcifications with metallic device in the RIGHT ventricular apex, favor wireless pulse generator. 4. 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  This report was signed and finalized on 10/3/2024 3:29 PM by Dr Kristen Turpin MD.      Walking Oximetry    Result Date: 10/2/2024  Narrative: Valeria Andrea, GAYE     10/2/2024  4:12 PM Patient Name:Karoline Prater MRN: [id number] Date: 10/02/24         ROOM AIR BASELINE SpO2%  Heart Rate  Blood Pressure  EXERCISE ON ROOM AIR SpO2% EXERCISE ON O2 @  LPM SpO2% 1 MINUTE  1 MINUTE  2 MINUTES  2 MINUTES  3 MINUTES  3 MINUTES  4 MINUTES  4 MINUTES  5 MINUTES  5 MINUTES  6 MINUTES  6 MINUTES           Distance Walked   Distance Walked Dyspnea (Pina Scale)   Dyspnea (Pina Scale) Fatigue (Pina Scale)   Fatigue (Pina Scale) SpO2% Post Exercise   SpO2% Post Exercise HR Post Exercise   HR Post Exercise Time to Recovery   Time to Recovery Comments: Pulse ox on 7 lpm at rest 93%. Walked pt to chair about 4 feet sat dropped to 75% on 10 lpm. Pt recovered to 93% after 10 mins on 15 lpm. Walked pt on 15 lpm for 10 feet sat dropped to 81%. Pt recovered to 93% on 15 lpm. Decreased oxygen back to 7 lpm sat stayed between 91-93%.Exercise Oximetry.    XR Chest 1 View    Result Date: 9/30/2024  Narrative: EXAMINATION: XR CHEST 1 VW- 9/30/2024 1:46 PM   HISTORY: follow-up hypoxemia.  REPORT: A frontal view of the chest was obtained.  COMPARISON: Chest x-ray 9/26/2024. CT chest without contrast 9/21/2024.  There are coarse reticular interstitial opacities throughout both lungs similar to the previous study and likely related to advanced pulmonary fibrosis. There is a superimposed area of stable masslike parenchymal consolidation in the right upper lobe which is unchanged. There is blunting of the right costophrenic angle which may be due to a small pleural effusion or chronic pleural thickening. The right internal jugular port catheter appears in good position as before. Heart size is normal.      Impression: Stable chest x-ray compared with the study from 4 days earlier.  This report was signed and finalized on 9/30/2024 1:49 PM by Dr. Allen Churchill MD.      XR Chest 1 View    Result Date: 9/26/2024  Narrative: EXAMINATION: XR CHEST 1 VW-  9/26/2024 9:52 PM  HISTORY: COVID-pneumonia.  FINDINGS: Today's exam is compared to previous study of 1 week earlier. A tunneled infusion cath remains in place with the tip in the SVC. Diffuse interstitial changes are noted of the lungs which appear to be somewhat chronic in nature. There is blunting of the right lateral costophrenic angle suggesting either pleural thickening or a small effusion. The heart is enlarged. A leadless pacer projects over the left heart border.      Impression: 1.. Increased interstitial markings which appear to be chronic in nature and may represent an element of interstitial fibrosis. There is chronic blunting of the right lateral costophrenic angle. 2. No acute infiltrate or free air.  This report was signed and finalized on 9/26/2024 9:53 PM by Dr. Naveed Reynolds MD.      CT Chest Without Contrast Diagnostic    Result Date: 9/21/2024  Narrative: CT CHEST WO CONTRAST DIAGNOSTIC- 9/21/2024 11:26 AM  HISTORY: Pneumonia; R09.02-Hypoxemia; J98.4-Other disorders of lung; N39.0-Urinary tract  infection, site not specified; Z74.09-Other reduced mobility  COMPARISON: 8/3/2024  TOTAL DOSE LENGTH PRODUCT: 164.12 mGy.cm. Automated exposure control was also utilized to decrease patient radiation dose.  TECHNIQUE: Axial images of the chest are performed without IV contrast.  FINDINGS: Similar mildly enlarged mediastinal lymph nodes partially calcified measuring up to 1.5 cm in short axis no axillary lymphadenopathy. Right internal jugular port in place with the tip present in the SVC. Moderate vascular calcification with no thoracic aortic aneurysm. Dense diffuse coronary calcifications. Leadless cardiac pacer device. Cardiomegaly. No pericardial or pleural effusion. Diffuse right slight nodular pleural thickening.  Images of the upper abdomen redemonstrate pneumobilia. No adrenal nodule. Moderate stool of the visible colon. Cortical renal atrophy.  Spiculated mass like is very similar to the 8/3/2024 study measuring approximately 4.2 x 3.6 x 2.0 cm (8/3/2024 measurement 4.9 x 4.2 cm, 6/29/2024 measurement 4.7 x 2.1 cm). Diffuse chronic interstitial lung disease with bronchiectasis peripheral honeycombing favoring usual interstitial pneumonitis/interstitial pulmonary fibrosis. There is similar appearing scarring of the right lung apex with associated volume loss. A similar 2 x 1 cm irregular nodule stable of the right middle lobe (series 3 image 71). No pneumothorax. No discrete endobronchial lesion.  Osteopenia of the regional skeleton. No focal aggressive regional bony lesions identified. Exaggerated kyphosis of the degenerative thoracic curvature.      Impression:  1. Spiculated masslike opacity of the right upper lobe, similar to only minimally decreased compared to 8/3/2024, increased compared to 6/29/2024. Neoplastic process must be considered. Stable scarring suspected of the right lung apex. Nonspecific irregular 2 x 1 cm nodular focus right middle lobe for which continued imaging surveillance  recommended. Advanced chronic interstitial lung disease with bronchiectasis. No pneumothorax. Diffusely irregular right pleural thickening remains stable.  This report was signed and finalized on 9/21/2024 1:13 PM by Dr. Betsey Wells MD.      XR Chest 1 View    Result Date: 9/19/2024  Narrative: EXAMINATION:  XR CHEST 1 VW-  9/19/2024 6:18 PM  HISTORY: Shortness of air and fever.  COMPARISON: 9/5/2024.  TECHNIQUE: Single view AP image.  FINDINGS: There is underlying interstitial lung disease. There are superimposed interstitial infiltrates in the mid and lower lung zones. There is a small pleural effusion on the right. There is a port catheter on the right. Heart size is within normal limits. The thoracic aorta is atheromatous.       Impression: 1. Chronic interstitial lung disease. 2. Superimposed interstitial infiltrate in the mid and lower lung zones, likely pulmonary edema. 3. Small right pleural effusion.    This report was signed and finalized on 9/19/2024 7:23 PM by Dr. Ramos Lagos MD.               My interpretation:Multiple nodular changes. Evidence of significant chronic lung disease and pulmonary fibrosis bilaterally. There seems to be significant progression with a several new consolidations in the right lung as well as left lung.  Hard to differentiated inflammation versus progression of cancer but believe this could be related to worsening lymphangitic carcinomatosis.    ASSESSMENT:  #Metastatic non-small cell lung cancer, adenocarcinoma stage IV-cannot rule out progression at this time but certainly patient has significant findings that is hard to be distinguish between malaise and chronic lung disease/pulmonary fibrosis.  Lymphangitic carcinomatosis is a possibility.  In case, he is not a candidate to continue antineoplastic therapy at this time.  Consult palliative care to discuss goals and expectations.  Patient has had multiple hospitalization has a very poor performance status.  Significant  decline of his performance status with significant increase O2 supplementation.  This is consistent with worsening respiratory status.  There is a combination of factors to account for this to include his chronic interstitial change/fibrosis as well as likely progression of malignancy. In addition, recent history of COVID-19.      # Acute on chronic hypoxic respiratory failure-pulmonary following.  Patient on high flow 5 L/min.  Per primary team/pulmonary.      # Acute COVID-19 infection-per primary team  # CKD stage III  Lab Results   Component Value Date    CREATININE 1.59 (H) 10/01/2024    BUN 37 (H) 10/01/2024     10/01/2024    K 4.8 10/01/2024     10/01/2024    CO2 18.0 (L) 10/01/2024     # CHF-per primary team    #Life-threatening medical illness-patient has severe chronic hypoxic respiratory failure secondary to diffuse pulmonary fibrosis.  In addition, evidence of non-small cell lung cancer.  Patient wants to know if his lung cancer has progressed significantly.  I told him that I would have asked radiologist to compare all the prior CT scans.          PLAN:  Continue current supportive care  Extensive review of medical records  Palliative care consultation to discuss goals and expectations  We will discuss with radiology regarding comparing all the present CT scan of the chest over the last few months  Care plan discussed with the hospitalist, Dr. Michele, patient      10/06/24  11:36 CDT

## 2024-10-06 NOTE — PROGRESS NOTES
[facility]  INPATIENT PROGRESS NOTE    Patient Name: Karoline Prater  Date of Admission: 9/19/2024  Today's Date: 10/06/24  Length of Stay: 16  Primary Care Physician: Irving Alexis MD    Subjective   Chief Complaint: Shortness of breath  HPI   10/1 Patient dyspneic at rest continues to require 7 to 10 L nasal cannula.  He has had epistaxis that is now resolved.  10/02 Patient has ambulated with pulse oximetry and had significant hypoxemia requiring 15 L oxygen nasal cannula to recover.  Although he does appear at baseline.  10/03 No new complaints.  Nasal congestion is improving no further epistaxis.  Trying to discuss poor prognosis patient seems evasive.  Will ask for a palliative care consult.  10/04 Patient resting comfortably.  States his breathing is feeling better but he is still having high flow oxygen requirements.  Has requested to follow-up with his oncologist.  Moved bowels with formed stools and a little bit of rectal irritation.    Today:  No new complaints, resting comfortably. Dyspneic at rest baseline.     Review of Systems   All pertinent negatives and positives are as above. All other systems have been reviewed and are negative unless otherwise stated.     Objective    Temp:  [97.4 °F (36.3 °C)-98 °F (36.7 °C)] 97.7 °F (36.5 °C)  Heart Rate:  [59-94] 76  Resp:  [20-22] 20  BP: (114-132)/(57-76) 132/57  Physical Exam  Constitutional:       General: He is not in acute distress.     Appearance: He is not ill-appearing.   HENT:      Head: Normocephalic.      Nose:      Comments: No active epistaxis, clot on both nosetrils      Mouth/Throat:      Mouth: Mucous membranes are moist.   Eyes:      Pupils: Pupils are equal, round, and reactive to light.   Cardiovascular:      Rate and Rhythm: Normal rate.   Pulmonary:      Effort: Pulmonary effort is normal.      Comments: Scattered coarse, crackly BSs on 7LNC during my assessment with 02  "sats 92-93% - he had just returned to bed shortly before my arrival  Musculoskeletal:         General: No deformity.   Skin:     General: Skin is warm.   Neurological:      Mental Status: He is alert.      Motor: Weakness present.   Psychiatric:         Mood and Affect: Mood normal.     Results Review:  I have reviewed the labs, radiology results, and diagnostic studies.    Laboratory Data:   Results from last 7 days   Lab Units 10/01/24  0435   WBC 10*3/mm3 7.31   HEMOGLOBIN g/dL 11.1*   HEMATOCRIT % 37.3*   PLATELETS 10*3/mm3 182        Results from last 7 days   Lab Units 10/01/24  0339   SODIUM mmol/L 136   POTASSIUM mmol/L 4.8   CHLORIDE mmol/L 106   CO2 mmol/L 18.0*   BUN mg/dL 37*   CREATININE mg/dL 1.59*   CALCIUM mg/dL 10.0   GLUCOSE mg/dL 130*       Culture Data:   No results found for: \"BLOODCX\", \"URINECX\", \"WOUNDCX\", \"MRSACX\", \"RESPCX\", \"STOOLCX\"    Radiology Data:   Imaging Results (Last 24 Hours)       ** No results found for the last 24 hours. **            I have reviewed the patient's current medications.     Assessment/Plan   Assessment  Active Hospital Problems    Diagnosis     **Acute on chronic respiratory failure with hypoxia     Acute respiratory failure     Epistaxis     Chronic rhinitis     Acute-on-chronic respiratory failure     Pneumonitis     Bacterial pneumonia     COVID-19 virus infection     Chronic heart failure with preserved ejection fraction (HFpEF)     COPD (chronic obstructive pulmonary disease)     A-fib     Pulmonary fibrosis     Stage 3b chronic kidney disease     Malignant neoplasm of upper lobe of right lung     Essential hypertension        Treatment Plan  Vitals every 4 hours  Cardiac diet  IV fluids saline lock  Up ad shae.    COPD with acute on chronic respiratory failure  Continue oxygen 7 L nasal cannula with humidifier  Chest x-ray repeat noted  Pulmonology following  Oncology follow-up Dr. Mcihele or Jacky  Continue DuoNebs and Symbicort  Incentive spirometry and " flutter valve    Lung cancer, oncology consulted  CT chest 10/03 Noted  Per Dr. Rico:  Metastatic non-small cell lung cancer, adenocarcinoma stage IV-cannot rule out progression at this time but certainly patient has significant findings that is hard to be distinguish between malaise and chronic lung disease/pulmonary fibrosis.   Lymphangitic carcinomatosis is a possibility. In case, he is not a candidate to continue antineoplastic therapy at this time. Consult palliative care to discuss goals and expectations.     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     He is At risk of readmissions as he has had frequent hospitalizations this year, recommended palliative care but patient does not seem amenable to consider other     Medical Decision Making  Number and Complexity of problems: 4 complex problems        Conditions and Status        Condition is unchanged.     [facility] Data  External documents reviewed: none  Cardiac tracing (EKG, telemetry) interpretation: no new EKGs this AM  Radiology interpretation: no new radiology studies; chest x-ray 9/30/2024   Labs reviewed: as above  Any tests that were considered but not ordered: none     Decision rules/scores evaluated (example [id number], Wells, etc): none     Discussed with: patient     Care Planning  Shared  decision making: Discussed with patient with agreement to proceed with treatment plan as outlined  Code status and discussions: Full code     Disposition  Social Determinants of Health that impact treatment or disposition: None apparent at this time; patient does live at home alone  I expect the patient to be discharged to home with home health (possibly) when 02 requirement has improved (still requiring 8-10LNC with even minimal activity)    Electronically signed by Wilber Carbajal MD, 10/06/24, 11:53 CDT.

## 2024-10-06 NOTE — PLAN OF CARE
Goal Outcome Evaluation:  Plan of Care Reviewed With: patient        Progress: improving  Outcome Evaluation: PT tx completed. Pt in bed, resting on 5L O2 at rest. Did increase O2 to 7L in preparation for activity. SBA for bed mobility. O2 sats decreased to 78%. Education provided on pursed lip breathing. Unable to recover, increased O2 to 8L. Within a couple of min able to reach upper 80s. Pt then took steps to the chair with CGA. O2 sats decreased once again. Pt reclined in chair. Achieved 88-90% within 5 min. O2 slowly lowered back to 5L. Will cont to follow.      Anticipated Discharge Disposition (PT): sub acute care setting, skilled nursing facility

## 2024-10-06 NOTE — PLAN OF CARE
Goal Outcome Evaluation:  Plan of Care Reviewed With: patient        Progress: no change  Outcome Evaluation: Pt A&OX4. No c/o pain so far this shift. Pt had an episode where he desatted down to 86% on exertion. Pt recovered on 10L HFNC. Titrated back down to 7L. VSS.  61-83. Safety maintained.

## 2024-10-06 NOTE — PROGRESS NOTES
[facility] [place] PULMONARY PROGRESS NOTE - [facility]    Patient: Karoline Prater  1940   MR# [id number]   Acct# [id number]  10/06/24   07:22 CDT  Referring Provider: Wilber Carbajal,*    Chief Complaint: COVID, severe COPD, chronic respiratory failure oxygen dependent    Interval history: He is on 5 L high flow with an O2 sat of 96% while sleeping.  He awoke easily however as soon as he started talking and moving around in the bed his O2 sat dropped to 90%.  He notes he is feeling somewhat stronger and noted he slept pretty hard last night.  No new complaints today.  No new labs or imaging for review.  He is afebrile.    Meds:  acetaminophen, 1,000 mg, Oral, Nightly  aspirin, 81 mg, Oral, Daily  atorvastatin, 20 mg, Oral, Nightly  budesonide-formoterol, 2 puff, Inhalation, BID - RT  busPIRone, 5 mg, Oral, TID With Meals  cetirizine, 10 mg, Oral, Daily  empagliflozin, 10 mg, Oral, Daily  fluticasone, 2 spray, Each Nare, Daily  guaiFENesin, 600 mg, Oral, Q12H  HYDROcodone-acetaminophen, 1 tablet, Oral, TID  Hydrocort-Pramoxine (Perianal), 1 Application, Rectal, Daily  ipratropium-albuterol, 3 mL, Nebulization, 4x Daily - RT  isosorbide mononitrate, 30 mg, Oral, Daily Before Lunch  melatonin, 10 mg, Oral, Nightly  metoprolol succinate XL, 25 mg, Oral, Daily  pantoprazole, 40 mg, Oral, Daily  predniSONE, 40 mg, Oral, Daily With Breakfast  senna-docusate sodium, 2 tablet, Oral, BID  sodium bicarbonate, 650 mg, Oral, TID  sodium chloride, 2 spray, Each Nare, 5x Daily  tamsulosin, 0.4 mg, Oral, Nightly      sodium chloride, 2 spray        Physical Exam:  SpO2 Percentage    10/06/24 0510 10/06/24 0555 10/06/24 0603   SpO2: 96% 93% 94%     Body mass index is 27.1 kg/m².   Temp:  [97.4 °F (36.3 °C)-98 °F (36.7 °C)] 98 °F (36.7 °C)  Heart Rate:  [59-97] 84  Resp:  [18-22] 20  BP: (114-132)/(62-68) 128/63  Intake/Output Summary (Last 24 hours) at 10/6/2024 0790  Last data filed at 10/6/2024 0510  Gross  per 24 hour   Intake 1080 ml   Output 1350 ml   Net -270 ml     Physical Exam  Vitals reviewed.   Constitutional:       Appearance: He is well-developed.      Interventions: Nasal cannula in place.   Cardiovascular:      Rate and Rhythm: Normal rate.   Pulmonary:      Effort: Pulmonary effort is normal.      Breath sounds: Decreased breath sounds present.   Musculoskeletal:         General: Normal range of motion.      Cervical back: Normal range of motion and neck supple.   Neurological:      Mental Status: He is alert and oriented to person, place, and time.   Psychiatric:         Behavior: Behavior normal.         Thought Content: Thought content normal.         Judgment: Judgment normal.       Electronically signed by MARITZA Ballard, 10/6/2024, 07:22 CDT      Physician Substantive Portion: Medical Decision Making to follow:      Result Review    Laboratory Data:  Results from last 7 days   Lab Units 10/01/24  0435   WBC 10*3/mm3 7.31   HEMOGLOBIN g/dL 11.1*   PLATELETS 10*3/mm3 182     Results from last 7 days   Lab Units 10/01/24  0339   SODIUM mmol/L 136   POTASSIUM mmol/L 4.8   CHLORIDE mmol/L 106   CO2 mmol/L 18.0*   BUN mg/dL 37*   CREATININE mg/dL 1.59*   CALCIUM mg/dL 10.0         Lab 10/01/24  0339   GLUCOSE 130*             Lab 10/01/24  0339   PROBNP 1,967.0*     Microbiology Results (last 10 days)       ** No results found for the last 240 hours. **           Recent films:  No radiology results from the last 24 hrs         Pulmonary Assessment:  Acute on chronic hypoxic respiratory failure  Right upper lobe adenocarcinoma  Concern for lymphangitic spread of his lung CA  S/p Cefepime course of HCAP  Recent COVID-19 pneumonia  Interstitial lung disease  COPD with acute exacerbation    Recommend/plan:   -Continue supplemental oxygen as needed wean as tolerated, goal O2 sat of 88-92%  -CTA this visit concerning for lymphangitic spread of his known malignancy, suspect this is the driving factor  to his respiratory failure  -He has completed a course of Cefepime this visit for HCAP  -We are treating him for COPD exacerbation as well with scheduled nebs and steroids, will taper these on discharge  -Discussed with oncology today  -Agree with palliative care evaluation  -Will continue scheduled DuoNeb with Symbicort.  Changed to triple therapy on discharge with Breztri and as needed nebulized albuterol, this has been ordered    This visit was performed by both a physician and an APRN.  I performed all aspects of the medical decision making as documented.  Electronically signed by Dylan Gordon DO, 10/6/2024, 09:30 CDT

## 2024-10-07 ENCOUNTER — HOSPITAL ENCOUNTER (OUTPATIENT)
Facility: HOSPITAL | Age: 84
Discharge: HOME | End: 2024-11-01
Attending: INTERNAL MEDICINE | Admitting: INTERNAL MEDICINE
Payer: MEDICARE

## 2024-10-07 ENCOUNTER — CLINICAL DOCUMENTATION (OUTPATIENT)
Dept: HEMATOLOGY | Age: 84
End: 2024-10-07

## 2024-10-07 VITALS
HEART RATE: 85 BPM | DIASTOLIC BLOOD PRESSURE: 69 MMHG | HEIGHT: 64 IN | RESPIRATION RATE: 22 BRPM | TEMPERATURE: 97.8 F | SYSTOLIC BLOOD PRESSURE: 138 MMHG | WEIGHT: 157.1 LBS | BODY MASS INDEX: 26.82 KG/M2 | OXYGEN SATURATION: 100 %

## 2024-10-07 LAB
ANION GAP SERPL CALCULATED.3IONS-SCNC: 10 MMOL/L (ref 5–15)
BASOPHILS # BLD AUTO: 0.02 10*3/MM3 (ref 0–0.2)
BASOPHILS NFR BLD AUTO: 0.2 % (ref 0–1.5)
BUN SERPL-MCNC: 42 MG/DL (ref 8–23)
BUN/CREAT SERPL: 23.7 (ref 7–25)
CALCIUM SPEC-SCNC: 10.3 MG/DL (ref 8.6–10.5)
CHLORIDE SERPL-SCNC: 103 MMOL/L (ref 98–107)
CO2 SERPL-SCNC: 25 MMOL/L (ref 22–29)
CREAT SERPL-MCNC: 1.77 MG/DL (ref 0.76–1.27)
DEPRECATED RDW RBC AUTO: 60.9 FL (ref 37–54)
EGFRCR SERPLBLD CKD-EPI 2021: 37.9 ML/MIN/1.73
EOSINOPHIL # BLD AUTO: 0.02 10*3/MM3 (ref 0–0.4)
EOSINOPHIL NFR BLD AUTO: 0.2 % (ref 0.3–6.2)
ERYTHROCYTE [DISTWIDTH] IN BLOOD BY AUTOMATED COUNT: 17.3 % (ref 12.3–15.4)
GLUCOSE SERPL-MCNC: 120 MG/DL (ref 65–99)
HCT VFR BLD AUTO: 35.1 % (ref 37.5–51)
HGB BLD-MCNC: 10.2 G/DL (ref 13–17.7)
IMM GRANULOCYTES # BLD AUTO: 0.17 10*3/MM3 (ref 0–0.05)
IMM GRANULOCYTES NFR BLD AUTO: 2.1 % (ref 0–0.5)
LYMPHOCYTES # BLD AUTO: 0.45 10*3/MM3 (ref 0.7–3.1)
LYMPHOCYTES NFR BLD AUTO: 5.4 % (ref 19.6–45.3)
MCH RBC QN AUTO: 27.8 PG (ref 26.6–33)
MCHC RBC AUTO-ENTMCNC: 29.1 G/DL (ref 31.5–35.7)
MCV RBC AUTO: 95.6 FL (ref 79–97)
MONOCYTES # BLD AUTO: 0.71 10*3/MM3 (ref 0.1–0.9)
MONOCYTES NFR BLD AUTO: 8.6 % (ref 5–12)
NEUTROPHILS NFR BLD AUTO: 6.9 10*3/MM3 (ref 1.7–7)
NEUTROPHILS NFR BLD AUTO: 83.5 % (ref 42.7–76)
NRBC BLD AUTO-RTO: 0 /100 WBC (ref 0–0.2)
PLATELET # BLD AUTO: 249 10*3/MM3 (ref 140–450)
PMV BLD AUTO: 11.9 FL (ref 6–12)
POTASSIUM SERPL-SCNC: 5.6 MMOL/L (ref 3.5–5.2)
RBC # BLD AUTO: 3.67 10*6/MM3 (ref 4.14–5.8)
SODIUM SERPL-SCNC: 138 MMOL/L (ref 136–145)
WBC NRBC COR # BLD AUTO: 8.27 10*3/MM3 (ref 3.4–10.8)

## 2024-10-07 PROCEDURE — 94799 UNLISTED PULMONARY SVC/PX: CPT

## 2024-10-07 PROCEDURE — 99497 ADVNCD CARE PLAN 30 MIN: CPT | Performed by: CLINICAL NURSE SPECIALIST

## 2024-10-07 PROCEDURE — 63710000001 PREDNISONE PER 1 MG: Performed by: INTERNAL MEDICINE

## 2024-10-07 PROCEDURE — 99233 SBSQ HOSP IP/OBS HIGH 50: CPT | Performed by: INTERNAL MEDICINE

## 2024-10-07 PROCEDURE — 94664 DEMO&/EVAL PT USE INHALER: CPT

## 2024-10-07 PROCEDURE — 99232 SBSQ HOSP IP/OBS MODERATE 35: CPT | Performed by: OTOLARYNGOLOGY

## 2024-10-07 PROCEDURE — 99232 SBSQ HOSP IP/OBS MODERATE 35: CPT | Performed by: CLINICAL NURSE SPECIALIST

## 2024-10-07 PROCEDURE — 80048 BASIC METABOLIC PNL TOTAL CA: CPT | Performed by: FAMILY MEDICINE

## 2024-10-07 PROCEDURE — 25810000003 SODIUM CHLORIDE 0.9 % SOLUTION: Performed by: INTERNAL MEDICINE

## 2024-10-07 PROCEDURE — 85025 COMPLETE CBC W/AUTO DIFF WBC: CPT | Performed by: FAMILY MEDICINE

## 2024-10-07 RX ORDER — ECHINACEA PURPUREA EXTRACT 125 MG
2 TABLET ORAL CONTINUOUS PRN
Status: ON HOLD
Start: 2024-10-07

## 2024-10-07 RX ORDER — ONDANSETRON 4 MG/1
4 TABLET, ORALLY DISINTEGRATING ORAL EVERY 6 HOURS PRN
Status: DISCONTINUED | OUTPATIENT
Start: 2024-10-07 | End: 2024-11-01 | Stop reason: HOSPADM

## 2024-10-07 RX ORDER — IPRATROPIUM BROMIDE AND ALBUTEROL SULFATE 2.5; .5 MG/3ML; MG/3ML
3 SOLUTION RESPIRATORY (INHALATION)
Status: ON HOLD
Start: 2024-10-07

## 2024-10-07 RX ORDER — HYDROCODONE BITARTRATE AND ACETAMINOPHEN 5; 325 MG/1; MG/1
1 TABLET ORAL 3 TIMES DAILY
Status: DISPENSED | OUTPATIENT
Start: 2024-10-07 | End: 2024-10-12

## 2024-10-07 RX ORDER — CETIRIZINE HYDROCHLORIDE 10 MG/1
10 TABLET ORAL DAILY
Status: DISCONTINUED | OUTPATIENT
Start: 2024-10-08 | End: 2024-10-23

## 2024-10-07 RX ORDER — POLYETHYLENE GLYCOL 3350 17 G/17G
17 POWDER, FOR SOLUTION ORAL DAILY PRN
Status: DISCONTINUED | OUTPATIENT
Start: 2024-10-07 | End: 2024-11-01 | Stop reason: HOSPADM

## 2024-10-07 RX ORDER — PREDNISONE 20 MG/1
40 TABLET ORAL
Status: DISPENSED | OUTPATIENT
Start: 2024-10-08 | End: 2024-10-13

## 2024-10-07 RX ORDER — AMOXICILLIN 250 MG
2 CAPSULE ORAL 2 TIMES DAILY
Status: DISCONTINUED | OUTPATIENT
Start: 2024-10-07 | End: 2024-10-16

## 2024-10-07 RX ORDER — SODIUM BICARBONATE 650 MG/1
650 TABLET ORAL 3 TIMES DAILY
Status: DISCONTINUED | OUTPATIENT
Start: 2024-10-07 | End: 2024-11-01 | Stop reason: HOSPADM

## 2024-10-07 RX ORDER — CETIRIZINE HYDROCHLORIDE 10 MG/1
10 TABLET ORAL DAILY
Status: ON HOLD
Start: 2024-10-08

## 2024-10-07 RX ORDER — DIPHENHYDRAMINE HCL 25 MG
25 CAPSULE ORAL NIGHTLY PRN
Status: ON HOLD
Start: 2024-10-07

## 2024-10-07 RX ORDER — IPRATROPIUM BROMIDE AND ALBUTEROL SULFATE 2.5; .5 MG/3ML; MG/3ML
3 SOLUTION RESPIRATORY (INHALATION)
Status: DISCONTINUED | OUTPATIENT
Start: 2024-10-07 | End: 2024-11-01 | Stop reason: HOSPADM

## 2024-10-07 RX ORDER — BUDESONIDE AND FORMOTEROL FUMARATE DIHYDRATE 160; 4.5 UG/1; UG/1
2 AEROSOL RESPIRATORY (INHALATION)
Status: ON HOLD
Start: 2024-10-07

## 2024-10-07 RX ORDER — NITROGLYCERIN 0.4 MG/1
0.4 TABLET SUBLINGUAL
Status: ON HOLD
Start: 2024-10-07

## 2024-10-07 RX ORDER — ONDANSETRON 2 MG/ML
4 INJECTION INTRAMUSCULAR; INTRAVENOUS EVERY 6 HOURS PRN
Status: DISCONTINUED | OUTPATIENT
Start: 2024-10-07 | End: 2024-11-01 | Stop reason: HOSPADM

## 2024-10-07 RX ORDER — GUAIFENESIN 600 MG/1
600 TABLET, EXTENDED RELEASE ORAL EVERY 12 HOURS SCHEDULED
Status: ON HOLD
Start: 2024-10-07

## 2024-10-07 RX ORDER — METOPROLOL SUCCINATE 25 MG/1
25 TABLET, EXTENDED RELEASE ORAL DAILY
Status: DISCONTINUED | OUTPATIENT
Start: 2024-10-08 | End: 2024-11-01 | Stop reason: HOSPADM

## 2024-10-07 RX ORDER — ZOLPIDEM TARTRATE 5 MG/1
2.5 TABLET ORAL NIGHTLY PRN
Status: ON HOLD
Start: 2024-10-07 | End: 2024-10-08

## 2024-10-07 RX ORDER — PRAMOXINE HYDROCHLORIDE HYDROCORTISONE ACETATE 100; 100 MG/10G; MG/10G
1 AEROSOL, FOAM TOPICAL DAILY
Status: ON HOLD
Start: 2024-10-08

## 2024-10-07 RX ORDER — ZOLPIDEM TARTRATE 5 MG/1
2.5 TABLET ORAL NIGHTLY PRN
Status: DISPENSED | OUTPATIENT
Start: 2024-10-07 | End: 2024-10-12

## 2024-10-07 RX ORDER — ECHINACEA PURPUREA EXTRACT 125 MG
2 TABLET ORAL CONTINUOUS PRN
Status: DISCONTINUED | OUTPATIENT
Start: 2024-10-07 | End: 2024-11-01 | Stop reason: HOSPADM

## 2024-10-07 RX ORDER — IPRATROPIUM BROMIDE AND ALBUTEROL SULFATE 2.5; .5 MG/3ML; MG/3ML
3 SOLUTION RESPIRATORY (INHALATION)
Status: CANCELLED | OUTPATIENT
Start: 2024-10-07

## 2024-10-07 RX ORDER — PREDNISONE 20 MG/1
40 TABLET ORAL
Qty: 2 TABLET | Refills: 0 | Status: ON HOLD | OUTPATIENT
Start: 2024-10-08 | End: 2024-10-09

## 2024-10-07 RX ORDER — BUDESONIDE AND FORMOTEROL FUMARATE DIHYDRATE 160; 4.5 UG/1; UG/1
2 AEROSOL RESPIRATORY (INHALATION)
Status: DISCONTINUED | OUTPATIENT
Start: 2024-10-07 | End: 2024-10-08

## 2024-10-07 RX ORDER — BUSPIRONE HYDROCHLORIDE 5 MG/1
5 TABLET ORAL
Status: ON HOLD
Start: 2024-10-07

## 2024-10-07 RX ORDER — TRAZODONE HYDROCHLORIDE 50 MG/1
25 TABLET, FILM COATED ORAL NIGHTLY PRN
Status: ON HOLD
Start: 2024-10-07

## 2024-10-07 RX ORDER — ECHINACEA PURPUREA EXTRACT 125 MG
2 TABLET ORAL
Status: DISCONTINUED | OUTPATIENT
Start: 2024-10-07 | End: 2024-11-01 | Stop reason: HOSPADM

## 2024-10-07 RX ORDER — BISACODYL 5 MG/1
5 TABLET, DELAYED RELEASE ORAL DAILY PRN
Status: DISCONTINUED | OUTPATIENT
Start: 2024-10-07 | End: 2024-10-16

## 2024-10-07 RX ORDER — DIPHENHYDRAMINE HCL 25 MG
25 CAPSULE ORAL NIGHTLY PRN
Status: DISCONTINUED | OUTPATIENT
Start: 2024-10-07 | End: 2024-11-01 | Stop reason: HOSPADM

## 2024-10-07 RX ORDER — ECHINACEA PURPUREA EXTRACT 125 MG
2 TABLET ORAL
Status: ON HOLD
Start: 2024-10-07

## 2024-10-07 RX ORDER — PANTOPRAZOLE SODIUM 40 MG/1
40 TABLET, DELAYED RELEASE ORAL DAILY
Status: DISCONTINUED | OUTPATIENT
Start: 2024-10-08 | End: 2024-11-01 | Stop reason: HOSPADM

## 2024-10-07 RX ORDER — ISOSORBIDE MONONITRATE 30 MG/1
30 TABLET, EXTENDED RELEASE ORAL
Status: DISCONTINUED | OUTPATIENT
Start: 2024-10-08 | End: 2024-11-01 | Stop reason: HOSPADM

## 2024-10-07 RX ORDER — BISACODYL 10 MG
10 SUPPOSITORY, RECTAL RECTAL DAILY PRN
Status: DISCONTINUED | OUTPATIENT
Start: 2024-10-07 | End: 2024-11-01 | Stop reason: HOSPADM

## 2024-10-07 RX ORDER — TRAZODONE HYDROCHLORIDE 50 MG/1
25 TABLET, FILM COATED ORAL NIGHTLY PRN
Status: DISCONTINUED | OUTPATIENT
Start: 2024-10-07 | End: 2024-11-01 | Stop reason: HOSPADM

## 2024-10-07 RX ORDER — ATORVASTATIN CALCIUM 10 MG/1
20 TABLET, FILM COATED ORAL NIGHTLY
Status: DISCONTINUED | OUTPATIENT
Start: 2024-10-07 | End: 2024-11-01 | Stop reason: HOSPADM

## 2024-10-07 RX ORDER — HYDROCODONE BITARTRATE AND ACETAMINOPHEN 5; 325 MG/1; MG/1
1 TABLET ORAL 3 TIMES DAILY
Qty: 3 TABLET | Refills: 0 | Status: ON HOLD | OUTPATIENT
Start: 2024-10-07 | End: 2024-10-12

## 2024-10-07 RX ORDER — GUAIFENESIN 600 MG/1
600 TABLET, EXTENDED RELEASE ORAL EVERY 12 HOURS SCHEDULED
Status: DISCONTINUED | OUTPATIENT
Start: 2024-10-07 | End: 2024-11-01 | Stop reason: HOSPADM

## 2024-10-07 RX ORDER — NITROGLYCERIN 0.4 MG/1
0.4 TABLET SUBLINGUAL
Status: DISCONTINUED | OUTPATIENT
Start: 2024-10-07 | End: 2024-11-01 | Stop reason: HOSPADM

## 2024-10-07 RX ORDER — TAMSULOSIN HYDROCHLORIDE 0.4 MG/1
0.4 CAPSULE ORAL NIGHTLY
Status: DISCONTINUED | OUTPATIENT
Start: 2024-10-07 | End: 2024-11-01 | Stop reason: HOSPADM

## 2024-10-07 RX ORDER — ASPIRIN 81 MG/1
81 TABLET ORAL DAILY
Status: DISCONTINUED | OUTPATIENT
Start: 2024-10-08 | End: 2024-11-01 | Stop reason: HOSPADM

## 2024-10-07 RX ORDER — FLUTICASONE PROPIONATE 50 MCG
2 SPRAY, SUSPENSION (ML) NASAL DAILY
Status: DISCONTINUED | OUTPATIENT
Start: 2024-10-08 | End: 2024-10-08

## 2024-10-07 RX ORDER — ACETAMINOPHEN 500 MG
1000 TABLET ORAL NIGHTLY
Status: DISCONTINUED | OUTPATIENT
Start: 2024-10-07 | End: 2024-11-01 | Stop reason: HOSPADM

## 2024-10-07 RX ORDER — METOPROLOL SUCCINATE 50 MG/1
25 TABLET, EXTENDED RELEASE ORAL DAILY
Status: ON HOLD
Start: 2024-10-07

## 2024-10-07 RX ORDER — BUSPIRONE HYDROCHLORIDE 5 MG/1
5 TABLET ORAL
Status: DISCONTINUED | OUTPATIENT
Start: 2024-10-07 | End: 2024-11-01 | Stop reason: HOSPADM

## 2024-10-07 RX ADMIN — SALINE NASAL SPRAY 2 SPRAY: 1.5 SOLUTION NASAL at 12:42

## 2024-10-07 RX ADMIN — CETIRIZINE HYDROCHLORIDE 10 MG: 10 TABLET ORAL at 09:08

## 2024-10-07 RX ADMIN — PREDNISONE 40 MG: 20 TABLET ORAL at 09:09

## 2024-10-07 RX ADMIN — ASPIRIN 81 MG: 81 TABLET, COATED ORAL at 09:08

## 2024-10-07 RX ADMIN — IPRATROPIUM BROMIDE AND ALBUTEROL SULFATE 3 ML: .5; 3 SOLUTION RESPIRATORY (INHALATION) at 14:37

## 2024-10-07 RX ADMIN — HYDROCODONE BITARTRATE AND ACETAMINOPHEN 1 TABLET: 5; 325 TABLET ORAL at 16:23

## 2024-10-07 RX ADMIN — SALINE NASAL SPRAY 2 SPRAY: 1.5 SOLUTION NASAL at 13:29

## 2024-10-07 RX ADMIN — SALINE NASAL SPRAY 2 SPRAY: 1.5 SOLUTION NASAL at 09:10

## 2024-10-07 RX ADMIN — FLUTICASONE PROPIONATE 2 SPRAY: 50 SPRAY, METERED NASAL at 09:10

## 2024-10-07 RX ADMIN — PANTOPRAZOLE SODIUM 40 MG: 40 TABLET, DELAYED RELEASE ORAL at 09:08

## 2024-10-07 RX ADMIN — GUAIFENESIN 600 MG: 600 TABLET, EXTENDED RELEASE ORAL at 09:09

## 2024-10-07 RX ADMIN — IPRATROPIUM BROMIDE AND ALBUTEROL SULFATE 3 ML: .5; 3 SOLUTION RESPIRATORY (INHALATION) at 10:29

## 2024-10-07 RX ADMIN — EMPAGLIFLOZIN 10 MG: 10 TABLET, FILM COATED ORAL at 09:08

## 2024-10-07 RX ADMIN — HYDROCODONE BITARTRATE AND ACETAMINOPHEN 1 TABLET: 5; 325 TABLET ORAL at 09:09

## 2024-10-07 RX ADMIN — IPRATROPIUM BROMIDE AND ALBUTEROL SULFATE 3 ML: .5; 3 SOLUTION RESPIRATORY (INHALATION) at 06:26

## 2024-10-07 RX ADMIN — BUSPIRONE HYDROCHLORIDE 5 MG: 5 TABLET ORAL at 09:08

## 2024-10-07 RX ADMIN — SODIUM BICARBONATE 650 MG: 650 TABLET ORAL at 16:23

## 2024-10-07 RX ADMIN — METOPROLOL SUCCINATE 25 MG: 25 TABLET, EXTENDED RELEASE ORAL at 09:09

## 2024-10-07 RX ADMIN — SODIUM ZIRCONIUM CYCLOSILICATE 10 G: 10 POWDER, FOR SUSPENSION ORAL at 13:29

## 2024-10-07 RX ADMIN — ISOSORBIDE MONONITRATE 30 MG: 30 TABLET, EXTENDED RELEASE ORAL at 12:41

## 2024-10-07 RX ADMIN — PRAMOXINE HYDROCHLORIDE HYDROCORTISONE ACETATE 1 APPLICATION: 100; 100 AEROSOL, FOAM TOPICAL at 09:10

## 2024-10-07 RX ADMIN — DOCUSATE SODIUM 50 MG AND SENNOSIDES 8.6 MG 2 TABLET: 8.6; 5 TABLET, FILM COATED ORAL at 09:08

## 2024-10-07 RX ADMIN — SODIUM BICARBONATE 650 MG: 650 TABLET ORAL at 09:09

## 2024-10-07 RX ADMIN — BUSPIRONE HYDROCHLORIDE 5 MG: 5 TABLET ORAL at 12:41

## 2024-10-07 RX ADMIN — BUDESONIDE AND FORMOTEROL FUMARATE DIHYDRATE 2 PUFF: 160; 4.5 AEROSOL RESPIRATORY (INHALATION) at 06:26

## 2024-10-07 NOTE — PROGRESS NOTES
HEMATOLOGY AND MEDICAL ONCOLOGY PROGRESS NOTE    Patient name: Karoline Prater  Patient : 1940  VISIT # [id number]  MR #[id number]  Room:     SUBJECTIVE:He feels better this morning.  He is receiving breathing treatment this morning at bedside.  Afebrile.         HISTORY OF PRESENT ILLNESS:    The patient is well-established DrDelon Michele.  He has diagnosed stage IV non-small cell lung carcinoma, adenocarcinoma type.  Patient has been off antineoplastic therapy.  Patient has multiple hospitalizations here at [facility] in addition, significant history of chronic respiratory failure secondary to pulmonary fibrosis bilateral.  Patient now hospitalized with acute on chronic hypoxemic respiratory failure requiring high flow and O2 supplementation.  Patient looks very sick.  I was consulted for continued of care.  Extensive review of medical records.  Extensive review of all lab and imaging studies.  Prior oncological history as detailed below     PRIOR CANCER HISTORY    The patient is a 84 years old male who is well-established in my clinic with Dr. Jeff Michele.  He has a complex medical history and has several comorbidities to include a history of hypertension, chronic respiratory failure, heart failure with preserved EF 61-65%, pulmonary hypertension, history of DVT on anticoagulation, history of GERD, history of stage III CKD.  Patient was recently hospitalized at .  The patient called the clinic on 2024 with complaints of increased shortness of breath over the last few days.  In addition.  Also complains of bilateral leg swelling.  Patient denies any chest pain.  Denies any hemoptysis.  Denies any fever or chills.  He was seen in the emergency department at .  Troponin was mildly elevated.  proBNP 295.  He received one-time dose of Lasix 20 mg.  Imaging studies as below.  He was admitted to the hospitalist with consultation to oncology.      2024-chest x-ray-a mass in the upper to midlung zone on the right is larger compared to prior study measuring in the range of about 4.8 cm.  Relatively stable pulmonary fibrosis.  Pleural thickening/effusion on the right also appears relatively stable.  Findings of cardiomegaly.     Most recent CT scans     CT scan of the chest without contrast at [facility] on 2024 was compared to 2024 reported the following  1. A spiculated mass in the right upper lobe laterally is larger on the   current study.   2. There is consolidation around a previously described nodule in the   right lung apex likely related to posttreatment change.   3. Diffuse reticulonodular fibrotic changes throughout both lungs.   Emphysema.   4. Pleural thickening on the right that is more focal in the right lung   base laterally. Metastatic pleural disease is considered.   5. Ectasia of the ascending thoracic aorta measuring 4.3 cm. Main   pulmonary artery segment is dilated measuring 3.6 cm. Mild cardiomegaly. ...      CT scan of the abdomen pelvis at [facility] on 2024 was compared to 2023 and reported the followin. There is a mildly dilated proximal small bowel loop measuring 4 cm.  No other small bowel distention is seen. This could be transient or due  to partial obstruction. Gastric wall thickening likely related to under  distention. There is under distention of portions of the colon and  moderate stool in the colon.  2. 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  Fibrosis in the lung bases with emphysema.  3. Left renal cyst. A 6 mm nonobstructive renal stone lower pole on the  left.  4. Mildly enlarged prostate measuring 4.7 cm.  5. Air within the biliary tree likely related to prior sphincterotomy.  Prior cholecystectomy.  6. Coarsening of the trabecula in the right femoral neck and  trochanteric region could be related to early Paget's disease. There is  no cortical thickening. A lipo sclerosing  "myxofibrous tumor is felt to  be less likely as there is no sclerotic margin.     Dr Michele discussed with dr Cool scans findings  Dr. Cool was gracious enough to review imaging studies with the following assessment by Dr. Danny Cool:  \"I have reviewed the CT scan of the thorax on this admission for atrial fibrillation with rapid ventricular response.     He completed recent SBRT radiotherapy for 2 right lung nodules on February 7, 2024.     Review of the current CT scan appears consistent with postradiation change.  We cannot entirely rule out progression however this appears unlikely with the chronology and radiographic appearance.     He is scheduled to return to our department in approximately 1 month with follow-up CT scan of the thorax and we will follow this closely with serial CT scans of the thorax.\"     Dr Michele has rescheduled Mr. Prater to see me in follow-up on 9/18/2024 at 10:30 AM     He completed recent SBRT radiotherapy for 2 right lung nodules on February 7, 2024.      It was felt on visit 7/2/2024 that the overall findings were stable.     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     He has a remote history of a pancreatic mass identified and biopsied by Dr. Alexis on 10/29/03.  Pathology negative on open biopsy.    Fahad completed 4 cycles of combination chemotherapy with carboplatin, Keytruda, Alimta on 7/5/2019.   Maintenance Keytruda and Alimta every 3 weeks was delivered.   The final cycle number #25 of Keytruda/Alimta was delivered on 10/29/2020.  Harry has been on a chemotherapy holiday since October 2020 due to issues of dyspnea and shortness of breath, requiring episodic steroids.     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    Imaging studies with CT scan of the chest on 11/9/2023 and a follow-up PET scan on 12/7/2023 documented localized progressive disease in the RUL of the lung.  He was referred to Dr. Danny Cool at [facility] for SBRT.    SBRT by Dr. Danny Cool was delivered in 5 treatment fractions for a total dose of 5000 cGy. XRT was initiated 1/24/2024 and was completed on 2/7/2024    Imaging were performed on 3/20/2024 to assess response to treatment and restaging on a chemotherapy.     CT CHEST WO CONTRAST AT [facility] on 3/20/2024: Compared to 11/08/2023  1.3 cm partially calcified subcarinal lymph node, unchanged.   2.0 x 1.5 x 1.9 cm right upper lobe mass, previously 2.0 x 2.5 x 1.9 cm   1.8 x 3.0 x 2.0 right upper lobe mass, previously 3.5 x 1.9 x 1.6 cm   Emphysematous changes with subpleural reticulations and possible honeycombing and fibrosis are noted with pleural thickening again observed along the right lateral chest wall extending to the diaphragmatic surface.   There are scattered subpleural calcifications     CT ABDOMEN PELVIS WO CONTRAST AT [facility] on 3/20/2024: compared to 11/08/2023  No evidence of metastasis in the abdomen/pelvis.         Mr. Prater was admitted on 6/29/2024 with chest pain on exertion elevated troponin level for cardiac evaluation and management.    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   Fahad has benign prostatic hypertrophy (BPH) that is stable on Flomax.   Orthostatic hypotension resolved with adjustment of metoprolol by Dr. Alexis   He takes Cozaar, metoprolol, Lasix and spironolactone without new cardiac issues.   Protonix continues without any new exacerbations of GERD.   Lower extremity edema overall has actually improved to some degree           DETAILED  TUMOR AND HEMATOLOGICAL HISTORIES COPIED FROM MY OFFICE RECORDS     TUMOR HISTORY: Adenocarcinoma on the RUL of the lung 11/27/2018  Karoline had CT scans performed for new onset of weight loss of 13 pounds over the previous year , associated with increased fatigue.   He denied respiratory symptoms of shortness of air, cough or productive sputum.    A CT scan of the chest on 9/12/2018 had been ordered by Dr. Irving Alexis due to weight loss and identified a new lobulated right upper lobe 5.7 cm mass that extended back to the right hilum.   Numerous mediastinal lymph nodes ranging in size from mm to 1.3 cm and a subcarnial lymph node that measured 1.5 x 1.5 x 3.5 cm.    A CT scan of the abdomen and pelvis with contrast on 9/12/2018 documented a 4.9 x 4 x 4 cm soft tissue mass with peripheral calcification immediately adjacent to the gallbladder in the head of the pancreas area.    An MRI of the abdomen and pelvis W & WO on 10/11/2018 identified in the 4.1 x 3.4 cm soft tissue mass in the subhepatic space, abutting the second portion of the duodenum with mild displacement of the duodenum. There does not appear to be involvement of the pancreas, and the pancreas appears within normal limits.  Differential diagnoses include a desmoid tumor, less likely leiomyoma of the wall of the duodenum or malignancy.   Dr. Michele requested a CT-guided biopsy of the right upper lobe mass.   Dr. Rosales, the radiologist, evaluated the area with a repeat CT scan of the chest on 10/11/2018.   He spoke with Dr. Michele indicating that he would not be able to perform the biopsy because the tumor was not accessible, it was under the scapula , which blocked access and therefore the biopsy procedure was canceled.    On the CT scan of the chest on 10/11/2018 measurements of the RUL lung mass was 5.7 x 3.2 cm with irregular margins suspicious for a primary lung neoplasm. Soft tissue associated with the tumor appeared to extend into the  bronchus supplying this portion of the RUL of the lung.   Dr. Rosales indicated that the mass had an endobronchial component and should be easily assessable via bronchoscopy.    The chest CT also included a portion of the upper abdomen where a soft tissue mass was described in an addendum report. In the subhepatic space measuring 4.0 x 3.9 cm, displacing the second portion of the duodenum medially.  A referral was made to Dr. Becerra for consideration for bronchoscopy for biopsy.    On 10/24/2018, Dr. Gareth Becerra performed a bronchoscopy with EBUS guided biopsy of a 3 cm subcarinal lymph node along with bronchoalveolar lavage of the posterior segment of the RUL of the lung.  The final pathology of the station 7 lymph node only revealed a background of small mature lymphocytes with clusters of histiocytes suggestive of granuloma.  Negative for malignant cells.  Kinyoun and GMS stains were negative for AFB and fungal organisms respectively.  The bronchial washings were negative for malignant cells as well.     Mr. Prater was referred to Dr. Meenakshi Polanco at [facility] in [place], [place], for navigational bronchoscopy and biopsy under ultrasound.    On 11/27/2018 Dr. Meenakshi Polanco performed a bronchoscopy, navigational protocol, endobronchial ultrasound and biopsy of the RUL of the lung mass along with multiple lymph node station Biopsies.  Pathology revealed the following:  Poorly differentiated Adenocarcinoma from the RUL of the lung mass on biopsy and bronchoalveolar lavage consistent with a lung primary.   All lymph nodes sampled were NEGATIVE for malignant cells including stations 10L, 4L, station 7, 10R, 4R.   IHC studies were positive for CK7, TTF-1 and Napsin A.   IHC studies on tumor cells were negative for CDX2, CK5/6, and p63   Further lung profile molecular testing is pending    All of the above was discussed at length with Mr. Prater at his 12/13/2018 clinic visit.   He was agreeable for  referral for consideration of resection of the lung lesion.     He is comfortable with and would like a referral to Dr. Ulysses Bardales ([phone]) at [facility], [facility], [address], [place], [place] [address].  Logistics, however, are planning a big part in his decision making and he may decide to have surgery done in the [place] area.    If he decides to have surgery in the [place], referral will be made to Dr. Frandy Patel.     CT chest with contrast 2/18/2019 at [facility] compared to 9/12/2018 revealed a 4.9 x 3.5 cm spiculated RUL lung mass which actually decreased in size from a prior value of 6.1 x 3.6 cm.   Subhepatic mass adjacent to the descending duodenum had increased in size significantly to 4.3 x 9 cm compared to 4.1 x 3.4 cm on the 10/11/18 MRI of the abdomen.    CT of the abdomen and pelvis without contrast on 3/20/2019 at [facility] was compared to outpatient CT data and pelvis on 9/12/2018 an MRI of the abdomen from 10/11/2018. A soft tissue mass was again encountered in the subhepatic space which abutted the distal stomach and proximal duodenum measuring 8.9 x 5.4 cm which has increased considerably from a prior measurement of 4.1 x 3.4 cm. Medial to the left renal hilum a 3.5 cm lobular mass causing some mild left-sided hydronephrosis.    Mr. Prater was referred to Dr. Frandy Patel who saw him on 1/22/2019 anticipating plans for a curative resection.     Dr. Michele received a phone call on 2/20/2019 from Dr. Patel , indicating that the previously documented an abdominal pancreatic area mass had doubled in size and was causing compression into the left kidney/renal pelvis producing a hydroureter.     Dr. Patel arranged a referral to Dr. Sylvester who will be placing a stent 3/8/2019.    Mr. Prater was scheduled to be seen by gastroenterology at [facility] on 3/13/2019 for EGD/EUS assessment and biopsy of the enlarging peripancreatic/duodenal area mass.  With a decrease in the lung  mass and increased size of the subhepatic mass-surgical resection was abandoned at present pending further evaluation of the subhepatic mass.  Dr. Michele discussed treatment options with the unresectable lung mass and the per he pancreatic mass and recommended a combination of Keytruda, Alimta, carboplatin.  He was subsequently admitted to [facility] 4/26 - 4/30/2019 with abdominal pain and melena. WBC pinky was 0.75 with ANC 0.34. PLT did drop to 24,000. He did have duodenal ulcerations that were bleeding on endoscopy. He was stabilized but then readmitted from 5/8 - 5/10/2019 with recurrent melanoma. A repeat endoscopy was performed. It was felt that exacerbation of his bleeding from the duodenal came about by his thrombocytopenia from treatment. Subsequent dosing was adjusted and Neulasta was added.    CT chest without contrast at [facility] on 6/27/2019 was compared to 2/18/2019 revealed that the right lung mass that extended into the right hilum has decreased from 5.2 x 3.5 down to 4.6 x 3.6. There is increased central cavitation in the mass consistent with tumor necrosis. Mediastinal lymphadenopathy is relatively stable.    CT abdomen and pelvis without contrast at [facility] on 6/27/2019 was compared to 4/26/2019 revealed complete resolution of the previously identified duodenal/subhepatic space soft tissue mass. The previously identified heterogenous enhancing lesion in the left renal pelvis was much less prominent but there does continue to be some mild left caliectasis.    I reviewed the CT results with Dr. Michele on 7/5/2019 who then relayed them as well to Karoline. We've had excellent results from this combination of therapy.    He completed the fourth combination therapy of Keytruda, carbo, Alimta on 7/5/2019 and then transitioned to maintenance Keytruda plus Alimta.    CT chest without contrast at [facility] on 1/9/2020 was compared to 10/17/2019 revealing:   A stable spiculated RUL nodule/mass with similar mediastinal  adenopathy.   Questionable right hilar adenopathy with diminished assessment due to lack of IV contrast.   Advanced emphysema with severe pulmonary fibrosis.   No new pulmonary nodules.    CT abdomen and pelvis without contrast at [facility] on 1/9/2020 was compared to 10/17/2019 revealing:   Mildly prominent aortocaval RP lymph nodes with position just below the level of the renal vein worrisome for potential lymphatic metastases. This is similar to 10/17/2019 but increased from 4/26/2019.   Pneumobilia without discrete suspicious focal lesion in the liver.   Wall thickening of the duodenum.   Similar and stable renal lesions favoring cysts.    He has had numerous endoscopies within the past year.   While he was having an Endo EUS and had cardiac issues and was actually admitted to the intensive care unit.     CT abdomen and pelvis without contrast on 7/16/2020 at [facility] was compared to 1/9/2020 and documented:  1.5 x 0.9 cm aortocaval lymph node, previously 1.9 x 1.2 cm  No new abnormality seen    CT chest without contrast on 11/12/2020 at [facility] ws compared to 1/9/2020 and documented:  Mixed response therapy with interval decrease in size in the RIGHT upper lobe pulmonary nodule but increase in size and mediastinal lymph nodes.  In addition, there is severe emphysema with findings of severe pulmonary fibrosis. Interval worsening of interstitial opacities bilaterally as well as suggestion of some pleural nodularity. Lymphangitic spread of malignancy should be considered.  Small pleural effusion on the RIGHT is new    CT abdomen and pelvis without contrast on 11/12/2020 at [facility] was compared to 7/16/2020 and documented:  The aortic caval node described on the comparison study is not significantly changed in size when measured at the same location.  Nonobstructing renal calculus on the LEFT. No change in the indeterminate renal lesions on the LEFT, likely cysts.  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   Fahad has benign prostatic hypertrophy (BPH) that is stable on Flomax.   Orthostatic hypotension resolved with adjustment of metoprolol by Dr. Alexis   He takes Tambocor, metoprolol without new cardiac issues.   Protonix continues without any new exacerbations of GERD.   Lower extremity edema overall has actually improved to some degree.    Fahad received his final cycle #25 of Keytruda/Alimta on 10/29/2020.   Treatment was held since that time due to issues of dyspnea and shortness of breath.    A course of steroids (prednisone) was initiated and antibiotics also delivered and although his respiratory issues improved, he has continued to have issues with weight loss.    CXR on 12/17/2020 documented no interval change, but in my personal review of the x-ray, there is significant pulmonary fibrosis not significantly changed compared to the x-ray from October 2020.    I discussed the findings on the chest x-ray and the comparison at his clinic visit on 12/30/2020. He does not appear to be improving but rather quite stable.  Vianey has pulmonary fibrosis as documented on a CT scan of the chest without contrast at [facility] on 11/12/2020.  Fahad is in agreement to go on a chemotherapy holiday with a repeat CT scan of the chest in 3 months and monitor his situation clinically and radiographically without systemic intervention going forward.    CT CHEST WO contrast on 3/17/2021, compared to 11/12/2020 at [facility] documented:   Mixed response therapy with interval decrease in size in the RIGHT upper lobe pulmonary nodule but increase in size and mediastinal lymph nodes.  Slight interval decrease in size in the RIGHT upper lobe nodule/mass measuring 4.7 x 2.2 cm today, previously 5.0 x  2.4 cm. RIGHT apical nodular density measuring up to 0.6 cm, previously 0.4 cm.  In addition, there is severe emphysema with findings of severe pulmonary fibrosis. Interval worsening of interstitial opacities bilaterally as well as suggestion of some pleural nodularity. Lymphangitic spread of malignancy should be considered.  Small pleural effusion on the RIGHT is new.    CT ABD & PELVIS WO contrast on 3/17/2021, compared to 11/12/2020, at [facility] documented:  Nonobstructing calculus lower pole left kidney  Low-density lesions associated with the left renal contour probably proteinaceous cyst these are unchanged  Chronic right lateral pleural complex in the lung base with bilateral small basilar pneumothoraces..     XR CHEST (2 VW) 4/21/2021 at [facility] , compared to December 17, 2020, documented:  Advanced interstitial fibrotic changes noted throughout the pulmonary parenchyma unchanged from December 17, 2020.  Small to moderate right lateral pleural complex. This may be pleural fluid or thickening is unchanged December 17, 2020.    XR CHEST (2 VW) 6/16/2021:  2.5 cm area of residual nodularity versus scarring in the RUL zone.  Probable small pleural effusions.  Bilateral interstitial infiltrates stable compared to the 2 most recent studies but increased compared to the 2019 study.   There may beunderlying pulmonary fibrosis that is worsening.   Pulmonary edema is not ruled out.     CT CHEST WO CONTRAST DIAGNOSTIC at [facility]- 11/15/2021:Comparison: CT chest without contrast 8/25/2021  3.0 x 2.2 cm spiculated mass in the right upper lobe, stable  There is pleural thickening lateral to the mass with bands of probable fibrosis, stable   4 mm RUL there is a small stellate shaped nodule, previously 5 mm  3 mm nodule in the right lower lobe posterior to the major fissure, unchanged  There is a calcified granuloma in the medial basal segment left lower lobe   Small calcified pleural plaques in the upper left hemithorax as  before.  There are several partially calcified mediastinal lymph nodes which appears stable  There is increased pleural thickening along the anterior margin of the right lower lobe.     CT ABDOMEN PELVIS WO CONTRAST at [facility]- 11/15/2021:Comparison: CT abdomen pelvis without contrast 3/17/2021  Review of lung windows demonstrates extensive reticular interstitial fibrosis in the lower lung zones, as well as loculated pleural fluid in the right lateral lung base with pleural thickening   10 mm.hyperattenuating exophytic nodule at the inferior pole the left kidney   7 mm nephrolithiasis at the inferior pole the left kidney.  The prostate gland is enlarged  There is grade 1 spondylolisthesis at L5-S1 with disc space collapse. This is stable    XR CHEST (2 VW) on 3/3/2022 at [facility], compared to 8/16/21, documented:  A 1.7 cm opacity in the right midlung zone appears smaller on the current study, previously measuring 2.5 cm.  Stable blunting of the costophrenic angles right greater than left.  Coarse interstitial lung disease appears relatively stable. Probable interstitial fibrosis    X-RAYS OF THE CHEST at [facility] on 6/29/2022:  The lungs show moderately severe fibrosis and COPD with pleural reaction blunting the right costophrenic sulcus  Small area consolidation noted right upper lobe    X-RAYS OF THE CHEST at [facility] on 9/28/2022:  The right-sided MediPort tip overlying the projection of the superior vena cava  The aorta is calcified and tortuous  The heart is enlarged in size.   There are diffuse interstitial opacities throughout the parenchyma bilaterally with a small right effusion.   There is a nodular density in the right upper lung field.    The RUL abnormality on the chest x-ray from today is likely residual scarring noted on the previous CT scan from November 2021.    CT chest without contrast at [facility] on 11/16/2022:COMPARISON: 11/15/2021, 8/25/2021  9 mm RIGHT upper lobe pulmonary nodule, previously 4 mm  3.0 x 1.9 cm  spiculated mass in the RIGHT upper lobe abutting the major minor fissures, unchanged  Chronic interstitial thickening, bronchiectasis, and interstitial fibrosis is similar to multiple prior studies.   Small RIGHT pleural effusion.   No change in a few borderline prominent partially calcified mediastinal lymph nodes    CT ABDOMEN PELVIS WO CONTRAST at [facility] on 11/16/2022:COMPARISON: 11/15/2021   There is mild cardiomegaly with mitral annulus calcifications and coronary artery calcification  Trace pericardial effusion or pericardial thickening is present.  There is interstitial fibrosis within the lung bases with honeycombing.   There is a loculated effusion within the right lateral costophrenic angle stable from the previous exam  There are cysts involving the left kidney.   More complex exophytic lesions involving the posterior midpole and the left lower pole may represent hemorrhagic cysts.  Anterolisthesis of L4 on L5 and L5 on S1 is present suggesting subluxation at the level of the facets at L4-L5  The prostate gland is mildly enlarged    CT CHEST WO on 11/9/2023 at [facility], compared to 11/16/22  multiple mediastinal lymph nodes, most of which are partially calcified which appear similar in size and number since the prior study. The largest measure up to 1.3 cm short axis subcarinal region and 1.2 cm short axis AP window  2.6 x 1.9 cm anterior right apical nodular consolidation, significantly increased in size, previously 0.9 cm.  3.6 x 1.9 cm Spiculated nodule right upper lobe, unchanged in size  1.2 x 1.1 cm additional perifissural opacity along the anterior minor fissure   Emphysema, bronchial thickening and peripheral reticular opacities present throughout both lungs. Stable emphysema.  Trace amount of pleural fluid lateral right lung base  Degenerative changes present in the spine. No discrete osseous lesion detected    CT ABDOMEN PELVIS WO on 11/9/23 at [facility], compared to 1/5/23  Pneumobilia, increased from prior.  This may be seen following sphincterotomy or other hepatobiliary procedures. Recommend correlation with operative history and hepatic enzyme levels. No portal venous gas or bowel pneumatosis  Nonobstructing let nephrolithiasis and unchanged left renal cysts  Moderate quantity of stool throughout the colon    PET CT SKULL BASE TO MID THIGH on 12/13/23, compared to CT 11/08/23  2.6 x 1.9 cm anterior right apical nodular consolidation, SUV 14.8, consistent with malignancy until proven otherwise  3.6 x 1.9 cm RUL spiculated nodule, SUV 5.34, consistent with increased likelihood of malignancy  No additional significantly FDG avid nodules are seen within the lung fields   Right hilar lymph node SUV 3.90  Right suprahilar lymph node SUV 4.26  Enlarged subcarinal node SUV 2.58  Nonenlarged precarinal node SUV 2.26  AP window node SUV 2.82  Additional smaller/less FDG avid nodes not individually described  In the head and neck, the included portions of the orbits and paranasal sinuses demonstrate normal levels of activity   The examination demonstrates a generally physiologic distribution of activity in the abdomen and pelvis   The examination demonstrates a generally physiologic distribution of activity in the included portions of the skeleton and extremeties     Fahad continues to have chronic dyspnea on exertion associated with pulmonary fibrosis from smoking history 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    Imaging studies with CT scan of the chest on 11/9/2023 and a follow-up PET scan on 12/7/2023 documented progressive disease in the RUL of the lung.  He was referred to Dr. Danny Cool at [facility] for SBRT.    SBRT by Dr. Danny Cool was delivered in 5 treatment fractions for a total dose of 5000 cGy. XRT was initiated 1/24/2024 and was completed on 2/7/2024    CT CHEST WO CONTRAST AT [facility] on 3/20/2024: Compared to 11/08/2023  1.3 cm partially calcified  subcarinal lymph node, unchanged.   2.0 x 1.5 x 1.9 cm right upper lobe mass, previously 2.0 x 2.5 x 1.9 cm   1.8 x 3.0 x 2.0 right upper lobe mass, previously 3.5 x 1.9 x 1.6 cm   Emphysematous changes with subpleural reticulations and possible honeycombing and fibrosis are noted with pleural thickening again observed along the right lateral chest wall extending to the diaphragmatic surface.   There are scattered subpleural calcifications     CT ABDOMEN PELVIS WO CONTRAST AT [facility] on 3/20/2024: compared to 11/08/2023  No evidence of metastasis in the abdomen/pelvis.   Multiple left renal cysts. Nonobstructing 4 mm stone at the left lower pole   Mild colonic diverticulosis   No aggressive osseous lesion identified   Multilevel degenerative spondylosis and mild dextroconvex scoliosis. Grade 1 anterolisthesis at L4-L5 and L5-S1. Right unilateral L5 pars defect.    TREATMENT SUMMARY  Keytruda, carboplatin, Alimta initiated 4/18/2019 to be given every 3 weeks for 4 cycles - 4th cycle 7/5/2019, then Keytruda + Alimta as maintenance to continue indefinitely until progression or intolerable side effects   Fahad received cycle #25 of Keytruda/Alimta on 10/29/2020. He was then placed on an indefinite chemotherapy holiday on 12/30/2020  SBRT by Dr. Danny Cool was delivered in 5 treatment fractions for a total dose of 5000 cGy. XRT was initiated 1/24/2024 and was completed on 2/7/2024          #2   TUMOR HISTORY: Pancreatic mass diagnosed 10/29/03  Mr. Prater presented in October 2003 with obstructive jaundice.   A CT scan of the abdomen on 10/26/2003 documented a 3.2 x 4 x 4.2 cm mass in the head of the pancreas.     On 10/29/03 Dr. Alexis performed a resection of a portion of the head of the pancreas on Fahad Prater along with a Klaudia-en-Y procedure and a choledochojejunostomy for what was felt to be a pancreatic cancer. His pancreas was bypassed because of the findings.  Pathology of the biopsies were negative for malignancy  showing a fibrosed pancreas.   Mr. Prater was referred to Dr. Vishal High in [place].    Dr. Vishal High performed ERCP with an EUS and biopsy on 02/26/04   Pathology again was negative for cancer or malignancy.   Routine periodic serologic and radiographic monitoring has not disclosed progression of the mass or development of new malignancy or increase in pancreatic tumor markers.     A CT scan of the abdomen and pelvis with contrast on 9/12/2018 documented a 4.9 x 4 x 4 cm soft tissue mass with peripheral calcification immediately adjacent to the gallbladder in the head of the pancreas area.    An MRI of the abdomen and pelvis W & WO on 10/11/2018 identified in the 4.1 x 3.4 cm soft tissue mass in the subhepatic space, abutting the second portion of the duodenum with mild displacement of the duodenum. There does not appear to be involvement of the pancreas, and the pancreas appears within normal limits.  Differential diagnoses include a desmoid tumor, less likely leiomyoma of the wall of the duodenum or malignancy.    CT of the abdomen and pelvis without contrast on 3/20/2019 at [facility] was compared to outpatient CT data and pelvis on 9/12/2018 an MRI of the abdomen from 10/11/2018. A soft tissue mass was again encountered in the subhepatic space which abutted the distal stomach and proximal duodenum measuring 8.9 x 5.4 cm which has increased considerably from a prior measurement of 4.1 x 3.4 cm. Medial to the left renal hilum a 3.5 cm lobular mass causing some mild left-sided hydronephrosis.    Mr. Prater was scheduled for an EGD/EUS by Dr. Bryson Moe as an outpatient procedure on 3/13/2019 for assessment and biopsy of the enlarging peripancreatic/duodenal area mass. Unfortunately, after initiation of the procedure, he began having ventricular tachycardia and the procedure had to be aborted. Karoline was transferred to the ICU for cardiology evaluation and stabilization. He remained hospitalized until 3/18/2019 when  he was medically cleared for discharge.    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 No emergent urologic intervention was warranted and he received monitoring of renal function. He had a creatinine level of 1.9 at discharge.    PET scan on 4/2/2019 identified a soft tissue mass in the RUL measuring 1.2 x 3.5 cm with extension to the right anterior hilum with an SUV of 10.1. Evidence of mediastinal and hilar lymphadenopathy, with low-level metabolic activity. Interval increase in the size of a large mass in the right upper abdomen inseparable from the duodenum measuring 10.7 x 6.9 cm compared to 5.4 x 8.9 cm on 2/20/2019 with an SUV of 23.6. Slight increase in 2 small inseparable masses medial to the hilum of the left kidney measuring 2.2 and 2.6 cm and the other measuring 3.9 cm with a maximum SUV of 18.6.    Cycle #1 of palliative chemotherapy with Carboplatin, Alimta and Keytruda was initiated 4/18/19 which should also cover this process.    Abdominal/pelvic CT 4/26/19 was performed at [facility] due to abdominal pain and melena. The RUQ mass, within the duodenum decreased to 6.8 x 6.2 cm (was 10.7 x 6.9 cm on PET 4/2/19)    CT abdomen and pelvis without contrast at [facility] on 6/27/2019 was compared to 4/26/2019 revealed complete resolution of the previously identified. Duodenal/subhepatic space soft tissue mass. The previously identified heterogenous enhancing lesion in the left renal pelvis was much less prominent but there does continue to be some mild left caliectasis.      CT abdomen and pelvis without contrast at [facility] on 1/9/2020 was compared to 10/17/2019 revealing:   Mildly prominent aortocaval RP lymph nodes with position just below the level of the renal vein worrisome for potential lymphatic metastases. This is similar to 10/17/2019 but increased from 4/26/2019.   Pneumobilia without discrete  suspicious focal lesion in the liver.   Wall thickening of the duodenum.   Similar and stable renal lesions favoring cysts.    CT ABD & PELVIS WO contrast on 3/17/2021, compared to 11/12/2020, at [facility] documented:  Nonobstructing calculus lower pole left kidney  Low-density lesions associated with the left renal contour probably proteinaceous cyst these are unchanged  Chronic right lateral pleural complex in the lung base with bilateral small basilar pneumothoraces..     He has had numerous endoscopies within the past year. When he was having an Endo EUS he had cardiac issues and required to the intensive care unit.     This area will just be monitored on follow-up scans without further elective endoscopic surveillance..    Mr. Prater requests that imaging studies only be done yearly.     CT ABDOMEN PELVIS WO CONTRAST at [facility]- 11/15/2021:Comparison: CT abdomen pelvis without contrast 3/17/2021  Review of lung windows demonstrates extensive reticular interstitial fibrosis in the lower lung zones, as well as loculated pleural fluid in the right lateral lung base with pleural thickening   10 mm.hyperattenuating exophytic nodule at the inferior pole the left kidney   7 mm nephrolithiasis at the inferior pole the left kidney.  The prostate gland is enlarged  There is grade 1 spondylolisthesis at L5-S1 with disc space collapse. This is stable     CT ABDOMEN PELVIS WO CONTRAST at [facility] on 11/16/2022:COMPARISON: 11/15/2021   There is mild cardiomegaly with mitral annulus calcifications and coronary artery calcification  Trace pericardial effusion or pericardial thickening is present.  There is interstitial fibrosis within the lung bases with honeycombing.   There is a loculated effusion within the right lateral costophrenic angle stable from the previous exam  There are cysts involving the left kidney.   More complex exophytic lesions involving the posterior midpole and the left lower pole may represent hemorrhagic  cysts.  Anterolisthesis of L4 on L5 and L5 on S1 is present suggesting subluxation at the level of the facets at L4-L5  The prostate gland is mildly enlarged    CT ABDOMEN PELVIS WO on 11/9/23 at [facility], compared to 1/5/23  Pneumobilia, increased from prior. This may be seen following sphincterotomy or other hepatobiliary procedures. Recommend correlation with operative history and hepatic enzyme levels. No portal venous gas or bowel pneumatosis  Nonobstructing let nephrolithiasis and unchanged left renal cysts  Moderate quantity of stool throughout the colon    CT CHEST WO CONTRAST AT [facility] on 3/20/2024: Compared to 11/08/2023  1.3 cm partially calcified subcarinal lymph node, unchanged.   2.0 x 1.5 x 1.9 cm right upper lobe mass, previously 2.0 x 2.5 x 1.9 cm   1.8 x 3.0 x 2.0 right upper lobe mass, previously 3.5 x 1.9 x 1.6 cm   Emphysematous changes with subpleural reticulations and possible honeycombing and fibrosis are noted with pleural thickening again observed along the right lateral chest wall extending to the diaphragmatic surface.   There are scattered subpleural calcifications     CT ABDOMEN PELVIS WO CONTRAST AT [facility] on 3/20/2024: compared to 11/08/2023  No evidence of metastasis in the abdomen/pelvis.   Multiple left renal cysts. Nonobstructing 4 mm stone at the left lower pole   Mild colonic diverticulosis   No aggressive osseous lesion identified   Multilevel degenerative spondylosis and mild dextroconvex scoliosis. Grade 1 anterolisthesis at L4-L5 and L5-S1. Right unilateral L5 pars defect.      #1   HEMATOLOGICAL HISTORY: IgG kappa MGUS- Dx: 02/23/06.  During the course of Mr. Prater’s follow up, an abnormally elevated protein was noted on one occasion, which led to workup including quantitative immunoglobulins.     An elevated IgG kappa was encountered. This has remained stable in the 2500 range since early 2006.     A bone marrow biopsy and aspirate on 02/23/06 was negative with only 4% plasma cells.      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     24 hour urine for immunoelectrophoresis did not reveal an M-spike.  Serology studies on 9/18/2018 documented an elevated, stable IgG of 2589 with an M spike of 0.7.   Immunofixation continued to reveal IgG monoclonal protein with kappa light chain specificity.      #2   HEMATOLOGICAL HISTORY: Iron deficiency anemia, 9/18/2018  Karoline had serology on 9/18/2018 that identified iron deficiency anemia.   His lab work was obtained at [facility].  An iron level was 12, iron saturation 7%, ferritin 256  Folate >20, and vitamin B12 1044.   Dr. Li placed Karoline on ferrous sulfate 325 mg daily on 9/25/2018 , but this failed to resolve the anemia.    An EGD by Dr. Randy Somres on 12/11/2018 documented a normal esophagus, normal stomach and a degree of duodenal deformity.  Gastric biopsy was urease negative.    Colonoscopy by Dr. Randy Somers on 1/9/2019 documented a polyp at 80 cm.  Pathology identified a tubular adenoma, negative for high-grade dysplasia.   Feraheme administered.    Labs on 3/13/2019:  Iron 25   Iron saturation 22%   TIBC 116   Ferritin >2000   Reticulocyte count 0.0578      Haptoglobin 341   Folate >20   Vitamin B12 945   Erythropoietin 13    He presented to [facility] on 4/26/2019 with melena and abdominal discomfort. He was subsequently admitted    EGD per Dr. Jj on 4/29/2019 revealed  LA grade (1 or more mucosal breaks greater than 5 mm, not extending between the tops of the 2 mucosal folds)?esophagitis with no bleeding found in the distal esophagus   Stomach was normal, biopsies for H. pylori taken.   Cardia and gastric fundus were normal on retroflexion   One nonbleeding cratered duodenal ulcer with no stigmata of bleeding was found in the duodenal bulb lesion was about 9 mm.   Many nonbleeding cratered, linear and superficial duodenal ulcers with no stigmata of bleeding were found in the second portion of the duodenum. The largest  "lesion was 6 mm in largest dimension. No mass encountered and anastomosis not seen.    He was admitted to [facility] on 5/8/2019 with melena, hematochezia, anemia, thrombocytopenia    Endoscopy performed by Dr. Bansal on 5/9/2019 revealed  Normal esophagus   Gastric mucosal variant. 2 erythematous spots in the antrum, ? AVM   Normal examined duodenum   Nothing found to account for symptoms   He developed pancytopenia from chemotherapy and did require transfusions.  His hemoglobin dropped to 7.3 on 6/25/2019 after his last treatment. He received 2 units packed red blood cells as an outpatient.    Serology 6/13/2019  Serum Fe - 117  TIBC - 587  Fe sat - 19.9%  Ferritin - 1,000  Iron levels have been adequately replaced. 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    TREATMENT SUMMARY  Feraheme 510 mg IV on 2/14 and 2/21/19   Venofer 200 mg IV daily 5/8 - 5/10/2019 as IP at [facility]   s/p 2 units pRBC on 4/26/19 and 2 units pRBC on 4/29/2019 as IP at [facility]?  s/p 2 units pRBC on?5/8/2019   s/p 2 pheresis plts on 5/8/2019  s/p 2 units pRBC as OP 6/26/2019          OBJECTIVE:/74 (BP Location: Right arm, Patient Position: Lying)   Pulse 80   Temp 97.6 °F (36.4 °C) (Oral)   Resp 21   Ht 162.6 cm (64\")   Wt 71.3 kg (157 lb 1.6 oz)   SpO2 92%   BMI 26.97 kg/m²   PHYSICAL EXAM:        CBC  Results from last 7 days   Lab Units 10/07/24  0403 10/01/24  0435   WBC 10*3/mm3 8.27 7.31   HEMOGLOBIN g/dL 10.2* 11.1*   HEMATOCRIT % 35.1* 37.3*   PLATELETS 10*3/mm3 249 182         Lab Results   Component Value Date     10/07/2024    K 5.6 (H) 10/07/2024     10/07/2024    CO2 25.0 10/07/2024    BUN 42 (H) 10/07/2024    CREATININE 1.77 (H) 10/07/2024    GLUCOSE 120 (H) 10/07/2024    CALCIUM 10.3 10/07/2024    BILITOT 0.4 09/21/2024    ALKPHOS 80 09/21/2024    AST 31 09/21/2024    ALT 34 09/21/2024    AGRATIO 1.0 09/21/2024    " GLOB 2.9 09/21/2024       Lab Results   Component Value Date    INR 1.04 06/29/2024    INR 1.29 (H) 01/05/2023    INR 1.18 (H) 08/25/2021    PROTIME 14.1 06/29/2024    PROTIME 16.2 (H) 01/05/2023    PROTIME 14.1 (H) 08/25/2021       CT Angiogram Chest     Result Date: 10/3/2024  Narrative: EXAM: CT ANGIOGRAM CHEST- - 10/3/2024 12:24 PM  HISTORY: hypoxia, PE eval; R09.02-Hypoxemia; J98.4-Other disorders of lung; N39.0-Urinary tract infection, site not specified; Z74.09-Other reduced mobility; J44.9-Chronic obstructive pulmonary disease, unspecified. 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  COMPARISON: 9/21/2024.  DOSE LENGTH PRODUCT: 366.39 mGy.cm. Automatic exposure control was utilized to make radiation dose as low as reasonably achievable.  TECHNIQUE: Enhanced axial CT images obtained with multiplanar reformats. 3D postprocessing, including MIPs, performed and images saved to PACS.  FINDINGS: AIRWAYS/PULMONARY PARENCHYMA: Linear probable secretions in the trachea. Main airways patent. Diffuse small airway thickening.  RIGHT upper lobe masslike opacity measures 4.5 x 3.6 cm, concerning for malignancy. Increased patchy opacities especially in the RIGHT lung. Interlobular septal thickening throughout.  Similar RIGHT middle lobe 2 x 1 cm pulmonary nodule on axial series 7, image 67 compared to 9/21/2024.  Similar RIGHT pleural thickening. No pneumothorax or drainable pleural fluid.  VESSELS: Contrast bolus is adequate. No evidence of pulmonary embolism. Enlarged main pulmonary artery, which can be seen with pulmonary artery hypertension. Aorta normal in course and caliber.  CARDIAC: Normal heart size with scattered coronary artery calcifications. Metallic device at the RIGHT ventricular apex, likely wireless pulse generator. No pericardial effusion.   MEDIASTINUM: Similar mediastinal adenopathy including an AP window lymph node  measuring 1.5 cm on axial series 5, image 46 compared to 9/21/2024.  Esophagus has normal coarse, caliber and wall thickness.  EXTRATHORACIC: The visualized portions of the thyroid gland are unremarkable. No thoracic inlet or axillary adenopathy. Port in the RIGHT chest wall, catheter tip in the mid SVC. There may be a component of chronic RIGHT jugular vein stenosis as there is opacification of collateral vessels in the chest.  INCLUDED UPPER ABDOMEN: Pneumobilia. Otherwise visualized portion of the liver, atrophic pancreas, spleen, adrenal glands appear within normal limits. Nephrolithiasis versus contrast excretion in the kidneys.  OSSEOUS: There are mild degenerative changes of the visualized spine. No acute or suspicious bony finding.        Impression: 1. Similar masslike spiculated opacity at the RIGHT upper lobe. Similar interlobular septal thickening with increased patchy opacities especially in the RIGHT lung. Appearance highly concerning for malignancy with lymphangitic spread of tumor. 2. Similar mild adenopathy. 3. Coronary artery calcifications with metallic device in the RIGHT ventricular apex, favor wireless pulse generator. 4. 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  This report was signed and finalized on 10/3/2024 3:29 PM by Dr Kristen Turpin MD.       Walking Oximetry     Result Date: 10/2/2024  Narrative: Valeria Andrea, GAYE     10/2/2024  4:12 PM Patient Name:Karoline Prater MRN: [id number] Date: 10/02/24         ROOM AIR BASELINE SpO2%  Heart Rate  Blood Pressure  EXERCISE ON ROOM AIR SpO2% EXERCISE ON O2 @  LPM SpO2% 1 MINUTE  1 MINUTE  2 MINUTES  2 MINUTES  3 MINUTES  3 MINUTES  4 MINUTES  4 MINUTES  5 MINUTES  5 MINUTES  6 MINUTES  6 MINUTES           Distance Walked   Distance Walked Dyspnea (Pina Scale)   Dyspnea (Pina Scale) Fatigue (Pina Scale)   Fatigue (Pina Scale) SpO2% Post Exercise   SpO2% Post Exercise HR Post Exercise   HR Post Exercise  Time to Recovery   Time to Recovery Comments: Pulse ox on 7 lpm at rest 93%. Walked pt to chair about 4 feet sat dropped to 75% on 10 lpm. Pt recovered to 93% after 10 mins on 15 lpm. Walked pt on 15 lpm for 10 feet sat dropped to 81%. Pt recovered to 93% on 15 lpm. Decreased oxygen back to 7 lpm sat stayed between 91-93%.Exercise Oximetry.     XR Chest 1 View     Result Date: 9/30/2024  Narrative: EXAMINATION: XR CHEST 1 VW- 9/30/2024 1:46 PM  HISTORY: follow-up hypoxemia.  REPORT: A frontal view of the chest was obtained.  COMPARISON: Chest x-ray 9/26/2024. CT chest without contrast 9/21/2024.  There are coarse reticular interstitial opacities throughout both lungs similar to the previous study and likely related to advanced pulmonary fibrosis. There is a superimposed area of stable masslike parenchymal consolidation in the right upper lobe which is unchanged. There is blunting of the right costophrenic angle which may be due to a small pleural effusion or chronic pleural thickening. The right internal jugular port catheter appears in good position as before. Heart size is normal.       Impression: Stable chest x-ray compared with the study from 4 days earlier.  This report was signed and finalized on 9/30/2024 1:49 PM by Dr. Allen Churchill MD.       XR Chest 1 View     Result Date: 9/26/2024  Narrative: EXAMINATION: XR CHEST 1 VW-  9/26/2024 9:52 PM  HISTORY: COVID-pneumonia.  FINDINGS: Today's exam is compared to previous study of 1 week earlier. A tunneled infusion cath remains in place with the tip in the SVC. Diffuse interstitial changes are noted of the lungs which appear to be somewhat chronic in nature. There is blunting of the right lateral costophrenic angle suggesting either pleural thickening or a small effusion. The heart is enlarged. A leadless pacer projects over the left heart border.       Impression: 1.. Increased interstitial markings which appear to be chronic in nature and may represent an  element of interstitial fibrosis. There is chronic blunting of the right lateral costophrenic angle. 2. No acute infiltrate or free air.  This report was signed and finalized on 9/26/2024 9:53 PM by Dr. Naveed Reynolds MD.       CT Chest Without Contrast Diagnostic     Result Date: 9/21/2024  Narrative: CT CHEST WO CONTRAST DIAGNOSTIC- 9/21/2024 11:26 AM  HISTORY: Pneumonia; R09.02-Hypoxemia; J98.4-Other disorders of lung; N39.0-Urinary tract infection, site not specified; Z74.09-Other reduced mobility  COMPARISON: 8/3/2024  TOTAL DOSE LENGTH PRODUCT: 164.12 mGy.cm. Automated exposure control was also utilized to decrease patient radiation dose.  TECHNIQUE: Axial images of the chest are performed without IV contrast.  FINDINGS: Similar mildly enlarged mediastinal lymph nodes partially calcified measuring up to 1.5 cm in short axis no axillary lymphadenopathy. Right internal jugular port in place with the tip present in the SVC. Moderate vascular calcification with no thoracic aortic aneurysm. Dense diffuse coronary calcifications. Leadless cardiac pacer device. Cardiomegaly. No pericardial or pleural effusion. Diffuse right slight nodular pleural thickening.  Images of the upper abdomen redemonstrate pneumobilia. No adrenal nodule. Moderate stool of the visible colon. Cortical renal atrophy.  Spiculated mass like is very similar to the 8/3/2024 study measuring approximately 4.2 x 3.6 x 2.0 cm (8/3/2024 measurement 4.9 x 4.2 cm, 6/29/2024 measurement 4.7 x 2.1 cm). Diffuse chronic interstitial lung disease with bronchiectasis peripheral honeycombing favoring usual interstitial pneumonitis/interstitial pulmonary fibrosis. There is similar appearing scarring of the right lung apex with associated volume loss. A similar 2 x 1 cm irregular nodule stable of the right middle lobe (series 3 image 71). No pneumothorax. No discrete endobronchial lesion.  Osteopenia of the regional skeleton. No focal aggressive regional bony  lesions identified. Exaggerated kyphosis of the degenerative thoracic curvature.       Impression:  1. Spiculated masslike opacity of the right upper lobe, similar to only minimally decreased compared to 8/3/2024, increased compared to 6/29/2024. Neoplastic process must be considered. Stable scarring suspected of the right lung apex. Nonspecific irregular 2 x 1 cm nodular focus right middle lobe for which continued imaging surveillance recommended. Advanced chronic interstitial lung disease with bronchiectasis. No pneumothorax. Diffusely irregular right pleural thickening remains stable.  This report was signed and finalized on 9/21/2024 1:13 PM by Dr. Betsey Wells MD.       XR Chest 1 View     Result Date: 9/19/2024  Narrative: EXAMINATION:  XR CHEST 1 VW-  9/19/2024 6:18 PM  HISTORY: Shortness of air and fever.  COMPARISON: 9/5/2024.  TECHNIQUE: Single view AP image.  FINDINGS: There is underlying interstitial lung disease. There are superimposed interstitial infiltrates in the mid and lower lung zones. There is a small pleural effusion on the right. There is a port catheter on the right. Heart size is within normal limits. The thoracic aorta is atheromatous.        Impression: 1. Chronic interstitial lung disease. 2. Superimposed interstitial infiltrate in the mid and lower lung zones, likely pulmonary edema. 3. Small right pleural effusion.    This report was signed and finalized on 9/19/2024 7:23 PM by Dr. Ramos Lagos MD.                My interpretation:Multiple nodular changes. Evidence of significant chronic lung disease and pulmonary fibrosis bilaterally. There seems to be significant progression with a several new consolidations in the right lung as well as left lung.  Hard to differentiated inflammation versus progression of cancer but believe this could be related to worsening lymphangitic carcinomatosis.     ASSESSMENT:  #Metastatic non-small cell lung cancer, adenocarcinoma stage IV-cannot rule  out progression at this time but certainly patient has significant findings that is hard to be distinguish between malaise and chronic lung disease/pulmonary fibrosis.  Lymphangitic carcinomatosis is a possibility.  In case, he is not a candidate to continue antineoplastic therapy at this time.  Consult palliative care to discuss goals and expectations.  Patient has had multiple hospitalization has a very poor performance status.  Significant decline of his performance status with significant increase O2 supplementation.  This is consistent with worsening respiratory status.  There is a combination of factors to account for this to include his chronic interstitial change/fibrosis as well as likely progression of malignancy. In addition, recent history of COVID-19.  Discussed extensively with radiology today.  They are going to make an addendum to his prior CT chest.  We asked for comparison over the last 3 CT scans.        # Acute on chronic hypoxic respiratory failure-pulmonary following.  Patient on high flow 5 L/min.  Per primary team/pulmonary.        # Acute COVID-19 infection-per primary team  # CKD stage III  Lab Results   Component Value Date    GLUCOSE 120 (H) 10/07/2024    CALCIUM 10.3 10/07/2024     10/07/2024    K 5.6 (H) 10/07/2024    CO2 25.0 10/07/2024     10/07/2024    BUN 42 (H) 10/07/2024    CREATININE 1.77 (H) 10/07/2024    EGFR 37.9 (L) 10/07/2024    BCR 23.7 10/07/2024    ANIONGAP 10.0 10/07/2024   # CHF-per primary team     #Life-threatening medical illness-patient has severe chronic hypoxic respiratory failure secondary to diffuse pulmonary fibrosis.  In addition, evidence of non-small cell lung cancer.  Patient wants to know if his lung cancer has progressed significantly.  I told him that I would have asked radiologist to compare all the prior CT scans.         PLAN:  Continue current supportive care  Extensive review of medical records  Palliative care consultation to discuss  goals and expectations  We will discuss with radiology regarding comparing all the present CT scan of the chest over the last few months  Care plan discussed with the hospitalist, Dr. Michele, patient  Discussed extensively with radiology today.  They are going to make an addendum to his prior CT chest.  We asked for comparison over the last 3 CT scans.         Chon Rico MD    10/07/24  06:00 CDT

## 2024-10-07 NOTE — PROGRESS NOTES
[facility] [place] PULMONARY AND CRITICAL CARE PROGRESS NOTE - [facility]    Patient: Karoline Prater  1940   MR# [id number]   Acct# [id number]  10/07/24   17:00 CDT  Referring Provider: Edmond Izquierdo MD    Chief Complaint: COVID, severe COPD, chronic respiratory failure, oxygen dependent      Interval history: Patient was seen in the follow-up visit in pulmonary rounds in medical floor today.  He appears stable on 6 L of oxygen.  Chart reviewed and current events noted.  He is getting discharged and will be transferred to LTAC unit for rehab.  He still has high oxygen dependence but did not have any acute events overnight.  Meds:    No current facility-administered medications for this encounter.    Physical Exam:  There were no vitals filed for this visit.  Body mass index is 26.98 kg/m².   Temp:  [97.4 °F (36.3 °C)-97.8 °F (36.6 °C)] 97.8 °F (36.6 °C)  Heart Rate:  [73-88] 85  Resp:  [20-24] 22  BP: (132-138)/(64-77) 138/69No intake or output data in the 24 hours ending 10/07/24 1700    Physical Exam    Physical Exam  Vitals reviewed.  Stable vitals patient currently on 6 L of oxygen and oxygen saturation 94%.  Constitutional:       Appearance: He is well-developed.      Interventions: Nasal cannula in place.   Cardiovascular:      Rate and Rhythm: Normal rate.   Pulmonary:      Effort: Pulmonary effort is normal.      Breath sounds: Decreased breath sounds present.   Musculoskeletal:         General: Normal range of motion.      Cervical back: Normal range of motion and neck supple.   Neurological:      Mental Status: He is alert and oriented to person, place, and time.   Psychiatric:         Behavior: Behavior normal.         Thought Content: Thought content normal.         Judgment: Judgment normal.     Laboratory Data:  Results from last 7 days   Lab Units 10/07/24  0403 10/01/24  0435   WBC 10*3/mm3 8.27 7.31   HEMOGLOBIN g/dL 10.2* 11.1*   PLATELETS 10*3/mm3 249 182     Results from  last 7 days   Lab Units 10/07/24  0403 10/01/24  0339   SODIUM mmol/L 138 136   POTASSIUM mmol/L 5.6* 4.8   BUN mg/dL 42* 37*   CREATININE mg/dL 1.77* 1.59*         Microbiology Results (last 10 days)       ** No results found for the last 240 hours. **          Recent films:  No radiology results for the last day  Personal review of imaging : CXR shows last imaging studies reviewed.  Shows chronic changes.  Lab work reviewed.  Showed high potassium and her renal impairment    Pulmonary Assessment:  Acute on chronic hypoxic respiratory failure  Dependence on supplemental oxygen  Bilateral pulm infiltrate/post-COVID syndrome  Secondary bacterial pneumonia  History of interstitial lung disease  Chronic congestive diastolic heart failure with preserved EF.  Chronic obstructive pulmonary disease.  No carcinoma the right upper lobe of the lung with metastasis  Status post chemotherapy and radiation treatment  Allergic rhinitis    Recommend:   Patient was seen in the follow-up visit in pulmonary rounds in medical floor today.  He is stable from the pulmonary standpoint.  He is currently on 6 L of oxygen with saturation in the 90s  He will continue bronchodilator treatment routine respiratory care when in the hospital..    He was encouraged to continue incentive spirometry  He will need oxygen at rest and more oxygen during activity.  He is planned for transfer to the LTAC unit this afternoon.  DVT and stress ulcer prophylaxis pain and anxiety control.  Nutritional support.  Plan for outpatient pulmonary follow-up with me after discharge in a month after discharge  Labs and imaging studies from time to time.  CODE STATUS: Full.  Overall prognosis: Guarded.  We will continue to follow-up in the LTAC  We appreciate the consult like to thank the hospitalist team for the referral.    Electronically signed by     Tarik Crocker MD,  Pulmonologist/Intensivist   10/07/24, 17:00 CDJOEL

## 2024-10-07 NOTE — PROGRESS NOTES
[facility] Otolaryngology Head and Neck Surgery  INPATIENT PROGRESS NOTE    Patient Name: Karoline Prater  : 1940   MRN: [id number]  Date of Admission: 2024  Today's Date: 10/07/24  Length of Stay: 17   PAD 4B   ROOM: [facility]   Lencho Macias*   Primary Care Physician: Irving Alexis MD  Surgical Procedures Since Admission:    Subjective   Subjective   Chief Complaint: Nasal obstruction, chronic rhinitis  Shortness of Breath       Accompanied by: Friend  Since last examined, Karoline Prater has improved.  He states that he has gotten the left nasal obstruction out.  He says he pulled out a long piece of bloody crust.  He continues wearing his O2 with humidity.  Patient seen, chart is reviewed    Review of Systems   Respiratory:  Positive for shortness of breath.       No change from prior inquiry  All pertinent negatives and positives are as above. All other systems have been reviewed and are negative unless otherwise stated.   Objective   Objective   Vitals:   Temp:  [97.4 °F (36.3 °C)-97.8 °F (36.6 °C)] 97.8 °F (36.6 °C)  Heart Rate:  [73-95] 88  Resp:  [20-24] 20  BP: (132-138)/(64-77) 138/69  Flow (L/min):  [6] 6    Physical Exam  Vitals reviewed.   Constitutional:       General: He is not in acute distress.     Appearance: Normal appearance. He is well-developed and normal weight. He is not ill-appearing.      Comments: Sitting up in bed, wearing nasal O2   HENT:      Head: Atraumatic.      Comments: Mild temporal wasting     Right Ear: External ear normal. Decreased hearing noted.      Left Ear: External ear normal. Decreased hearing noted.      Nose: Nose normal. No mucosal edema.      Right Nostril: No epistaxis or occlusion (Minimal crusting present).      Left Nostril: No epistaxis or occlusion.      Comments: Septum-right septum with mild layer of crusting  Left septum with o crusting  Extremely dry mucosa  Bilateral internal/external nasal  valve collapse, and narrowed nostrils     Mouth/Throat:      Lips: Pink.      Mouth: Mucous membranes are dry.      Dentition: Abnormal dentition. No gum lesions.      Tongue: No lesions. Tongue does not deviate from midline.      Palate: No mass and lesions.      Pharynx: Oropharynx is clear.      Comments: Dry oral and pharyngeal mucosa  Eyes:      General: Lids are normal. Gaze aligned appropriately.      Extraocular Movements: Extraocular movements intact.      Conjunctiva/sclera: Conjunctivae normal.   Neck:      Thyroid: No thyroid mass or thyromegaly.      Comments: Atrophic musculature  Pulmonary:      Effort: Pulmonary effort is normal. No tachypnea, respiratory distress or retractions.      Breath sounds: No stridor.      Comments: Nasal O2  Musculoskeletal:      Cervical back: No rigidity or crepitus. Decreased range of motion.   Lymphadenopathy:      Cervical: No cervical adenopathy.   Skin:     Findings: No rash.   Neurological:      General: No focal deficit present.      Mental Status: He is alert and oriented to person, place, and time.      Cranial Nerves: Cranial nerve deficit present.      Comments: Decreased hearing   Psychiatric:         Attention and Perception: Attention and perception normal.         Speech: Speech normal.         Behavior: Behavior normal. Behavior is cooperative.         Thought Content: Thought content normal.         Cognition and Memory: Cognition normal.         Judgment: Judgment normal.           Result Review    Result Review:  I have personally reviewed the results from the time of this admission to 10/7/2024 12:51 CDT and agree with these findings:  []  Laboratory  []  Microbiology  []  Radiology  []  EKG/Telemetry   []  Cardiology/Vascular   []  Pathology  []  Old records  []  Other:  Most notable findings include: No labs pertinent to ENT today    Assessment & Plan   Assessment / Plan   Brief Patient Summary:  Karoline Prater is a 84 y.o. male who continues  with nasal O2.  His nose is much more clear today.  Crusting is decreased.  He should continue hygiene with nasal saline.  Patient states his disposition is to be referred to LTAC.  I will follow him at the LTAC facility if need be.    Active Hospital Problems:   Active Hospital Problems    Diagnosis     **Acute on chronic respiratory failure with hypoxia     Acute respiratory failure     Epistaxis     Chronic rhinitis     Acute-on-chronic respiratory failure     Pneumonitis     Bacterial pneumonia     COVID-19 virus infection     Chronic heart failure with preserved ejection fraction (HFpEF)     COPD (chronic obstructive pulmonary disease)     A-fib     Pulmonary fibrosis     Stage 3b chronic kidney disease     Malignant neoplasm of upper lobe of right lung     Essential hypertension      Plan:   Chronic rhinitis-patient has chronic rhinitis, partially related to his nasal O2.  He should continue nasal hygiene with nasal saline.  Nasal obstruction-much improved since the left crust has been removed.  He should continue nasal saline  Respiratory failure-remains on nasal O2.  Per primary team  Discussed Plan with patient  Following patient as in-patient. Further recommendations will be made based on serial examinations.    Medications/Orders for this encounter: No new medications ordered.  No New orders written.     Discharge Planning: Per primary team        VTE Prophylaxis:  Mechanical VTE prophylaxis orders are present.         Electronically signed by Vijay James Jr, MD, 10/07/24, 12:51 PM CDT.

## 2024-10-07 NOTE — PROGRESS NOTES
Request sent to [facility] radiology for addendum on most recent CT 9/21/2024 to be compared to previous imaging at [facility], for possible progression.       Spoke with Dr. Love radiologist who is going to make an addendum on CT chest from 9/21/2024.

## 2024-10-07 NOTE — CASE MANAGEMENT/SOCIAL WORK
Continued Stay Note  [facility]     Patient Name: Karoline Prater  MRN: [id number]  Today's Date: 10/7/2024    Admit Date: 9/19/2024    Plan: LTAC   Discharge Plan       Row Name 10/07/24 1053       Plan    Plan LTAC    Final Note Pt to dc [facility] today.                   Discharge Codes    No documentation.                 Expected Discharge Date and Time       Expected Discharge Date Expected Discharge Time    Oct 3, 2024               NEREYDA YeungW

## 2024-10-07 NOTE — CONSULTS
Additional palliative consult received from oncology 10/6, already following.  Please see previous consult and progress notes.    Grace Aly, MARITZA  Palliative services

## 2024-10-07 NOTE — NURSING NOTE
Received report from night shift nurse. Patient in bed, breathing even and regular on 6L NC. Tele on, v-paced. Safety precautions in place, bed alarm on, side rails up x3. No acute discomfort or distress at this time.

## 2024-10-07 NOTE — PLAN OF CARE
Goal Outcome Evaluation:  Plan of Care Reviewed With: patient        Problem: Adult Inpatient Plan of Care  Goal: Plan of Care Review  Outcome: Progressing  Flowsheets (Taken 10/7/2024 0352)  Progress: improving  Plan of Care Reviewed With: patient  Outcome Evaluation: Pt AAOx4, VSS, breathing unlabored on 6L O2BNC high flow humidified. Pt had no c/o pain overnight and has slept well.   70-85 on tele. Pt expresses no needs at this time. safety maintained, care plan ongoing.

## 2024-10-07 NOTE — DISCHARGE SUMMARY
[facility] Medicine Services  DISCHARGE SUMMARY       Date of Admission: 9/19/2024  Date of Discharge:  10/7/2024  Primary Care Physician: Irving Alexis MD    Presenting Problem/History of Present Illness:  Shortness of breath    Final Discharge Diagnoses:  Active Hospital Problems    Diagnosis     **Acute on chronic respiratory failure with hypoxia     Acute respiratory failure     Epistaxis     Chronic rhinitis     Acute-on-chronic respiratory failure     Pneumonitis     Bacterial pneumonia     COVID-19 virus infection     Chronic heart failure with preserved ejection fraction (HFpEF)     COPD (chronic obstructive pulmonary disease)     A-fib     Pulmonary fibrosis     Stage 3b chronic kidney disease     Malignant neoplasm of upper lobe of right lung     Essential hypertension        Consults:   Palliative care  Oncology  Pulmonary  ENT    Procedures Performed:   None  Pertinent Test Results:   Results for orders placed during the hospital encounter of 06/29/24    Adult Stress Echo W/ Cont or Stress Agent if Necessary Per Protocol    Interpretation Summary    Overall, this is an intermediate risk test for ischemia.    No ECG evidence of myocardial ischemia. Negative clinical evidence of myocardial ischemia. Findings consistent with a normal ECG stress test.    Abnormal stress echo consistent with an intermediate risk study for myocardial ischemia.    Left ventricular systolic function is normal. Left ventricular ejection fraction appears to be 61 - 65%.    The following left ventricular wall segments are hypokinetic: apical septal and apex hypokinetic.      Imaging Results (All)       Procedure Component Value Units Date/Time    CT Angiogram Chest [[id number]] Collected: 10/03/24 1515     Updated: 10/03/24 1532    Narrative:      EXAM: CT ANGIOGRAM CHEST- - 10/3/2024 12:24 PM     HISTORY: hypoxia, PE eval; R09.02-Hypoxemia; J98.4-Other disorders of  lung;  N39.0-Urinary tract infection, site not specified; Z74.09-Other  reduced mobility; J44.9-Chronic obstructive pulmonary disease,  unspecified. 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     COMPARISON: 9/21/2024.     DOSE LENGTH PRODUCT: 366.39 mGy.cm. Automatic exposure control was  utilized to make radiation dose as low as reasonably achievable.     TECHNIQUE: Enhanced axial CT images obtained with multiplanar reformats.  3D postprocessing, including MIPs, performed and images saved to PACS.     FINDINGS:  AIRWAYS/PULMONARY PARENCHYMA: Linear probable secretions in the trachea.  Main airways patent. Diffuse small airway thickening.      RIGHT upper lobe masslike opacity measures 4.5 x 3.6 cm, concerning for  malignancy. Increased patchy opacities especially in the RIGHT lung.  Interlobular septal thickening throughout.     Similar RIGHT middle lobe 2 x 1 cm pulmonary nodule on axial series 7,  image 67 compared to 9/21/2024.     Similar RIGHT pleural thickening. No pneumothorax or drainable pleural  fluid.     VESSELS: Contrast bolus is adequate. No evidence of pulmonary embolism.  Enlarged main pulmonary artery, which can be seen with pulmonary artery  hypertension. Aorta normal in course and caliber.     CARDIAC: Normal heart size with scattered coronary artery  calcifications. Metallic device at the RIGHT ventricular apex, likely  wireless pulse generator. No pericardial effusion.       MEDIASTINUM: Similar mediastinal adenopathy including an AP window lymph  node measuring 1.5 cm on axial series 5, image 46 compared to 9/21/2024.     Esophagus has normal coarse, caliber and wall thickness.     EXTRATHORACIC: The visualized portions of the thyroid gland are  unremarkable. No thoracic inlet or axillary adenopathy. Port in the  RIGHT chest wall, catheter tip in the mid SVC. There may be a component  of chronic RIGHT jugular vein  stenosis as there is opacification of  collateral vessels in the chest.     INCLUDED UPPER ABDOMEN: Pneumobilia. Otherwise visualized portion of the  liver, atrophic pancreas, spleen, adrenal glands appear within normal  limits. Nephrolithiasis versus contrast excretion in the kidneys.     OSSEOUS: There are mild degenerative changes of the visualized spine. No  acute or suspicious bony finding.          Impression:      1. Similar masslike spiculated opacity at the RIGHT upper lobe. Similar  interlobular septal thickening with increased patchy opacities  especially in the RIGHT lung. Appearance highly concerning for  malignancy with lymphangitic spread of tumor.   2. Similar mild adenopathy.  3. Coronary artery calcifications with metallic device in the RIGHT  ventricular apex, favor wireless pulse generator.  4. 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     This report was signed and finalized on 10/3/2024 3:29 PM by Dr Kristen Turpin MD.       XR Chest 1 View [[id number]] Collected: 09/30/24 1346     Updated: 09/30/24 1352    Narrative:      EXAMINATION: XR CHEST 1 VW- 9/30/2024 1:46 PM     HISTORY: follow-up hypoxemia.     REPORT: A frontal view of the chest was obtained.     COMPARISON: Chest x-ray 9/26/2024. CT chest without contrast 9/21/2024.     There are coarse reticular interstitial opacities throughout both lungs  similar to the previous study and likely related to advanced pulmonary  fibrosis. There is a superimposed area of stable masslike parenchymal  consolidation in the right upper lobe which is unchanged. There is  blunting of the right costophrenic angle which may be due to a small  pleural effusion or chronic pleural thickening. The right internal  jugular port catheter appears in good position as before. Heart size is  normal.       Impression:      Stable chest x-ray compared with the study from 4 days  earlier.     This report was signed and finalized on 9/30/2024  1:49 PM by Dr. Allen Churchill MD.       XR Chest 1 View [[id number]] Collected: 09/26/24 2152     Updated: 09/26/24 2157    Narrative:      EXAMINATION: XR CHEST 1 VW-  9/26/2024 9:52 PM     HISTORY: COVID-pneumonia.     FINDINGS: Today's exam is compared to previous study of 1 week earlier.  A tunneled infusion cath remains in place with the tip in the SVC.  Diffuse interstitial changes are noted of the lungs which appear to be  somewhat chronic in nature. There is blunting of the right lateral  costophrenic angle suggesting either pleural thickening or a small  effusion. The heart is enlarged. A leadless pacer projects over the left  heart border.       Impression:      1.. Increased interstitial markings which appear to be chronic in nature  and may represent an element of interstitial fibrosis. There is chronic  blunting of the right lateral costophrenic angle.  2. No acute infiltrate or free air.     This report was signed and finalized on 9/26/2024 9:53 PM by Dr. Naveed Reynolds MD.       CT Chest Without Contrast Diagnostic [[id number]] Collected: 09/21/24 1300     Updated: 09/21/24 1316    Narrative:      CT CHEST WO CONTRAST DIAGNOSTIC- 9/21/2024 11:26 AM     HISTORY: Pneumonia; R09.02-Hypoxemia; J98.4-Other disorders of lung;  N39.0-Urinary tract infection, site not specified; Z74.09-Other reduced  mobility      COMPARISON: 8/3/2024     TOTAL DOSE LENGTH PRODUCT: 164.12 mGy.cm. Automated exposure control was  also utilized to decrease patient radiation dose.     TECHNIQUE: Axial images of the chest are performed without IV contrast.     FINDINGS: Similar mildly enlarged mediastinal lymph nodes partially  calcified measuring up to 1.5 cm in short axis no axillary  lymphadenopathy. Right internal jugular port in place with the tip  present in the SVC. Moderate vascular calcification with no thoracic  aortic aneurysm. Dense diffuse coronary calcifications. Leadless cardiac  pacer device. Cardiomegaly. No  pericardial or pleural effusion. Diffuse  right slight nodular pleural thickening.     Images of the upper abdomen redemonstrate pneumobilia. No adrenal  nodule. Moderate stool of the visible colon. Cortical renal atrophy.     Spiculated mass like is very similar to the 8/3/2024 study measuring  approximately 4.2 x 3.6 x 2.0 cm (8/3/2024 measurement 4.9 x 4.2 cm,  6/29/2024 measurement 4.7 x 2.1 cm). Diffuse chronic interstitial lung  disease with bronchiectasis peripheral honeycombing favoring usual  interstitial pneumonitis/interstitial pulmonary fibrosis. There is  similar appearing scarring of the right lung apex with associated volume  loss. A similar 2 x 1 cm irregular nodule stable of the right middle  lobe (series 3 image 71). No pneumothorax. No discrete endobronchial  lesion.     Osteopenia of the regional skeleton. No focal aggressive regional bony  lesions identified. Exaggerated kyphosis of the degenerative thoracic  curvature.       Impression:         1. Spiculated masslike opacity of the right upper lobe, similar to only  minimally decreased compared to 8/3/2024, increased compared to  6/29/2024. Neoplastic process must be considered. Stable scarring  suspected of the right lung apex. Nonspecific irregular 2 x 1 cm nodular  focus right middle lobe for which continued imaging surveillance  recommended. Advanced chronic interstitial lung disease with  bronchiectasis. No pneumothorax. Diffusely irregular right pleural  thickening remains stable.     This report was signed and finalized on 9/21/2024 1:13 PM by Dr. Betsey Wells MD.       XR Chest 1 View [[id number]] Collected: 09/19/24 1921     Updated: 09/19/24 1926    Narrative:      EXAMINATION:  XR CHEST 1 VW-  9/19/2024 6:18 PM     HISTORY: Shortness of air and fever.     COMPARISON: 9/5/2024.     TECHNIQUE: Single view AP image.     FINDINGS: There is underlying interstitial lung disease. There are  superimposed interstitial infiltrates in the  mid and lower lung zones.  There is a small pleural effusion on the right. There is a port catheter  on the right. Heart size is within normal limits. The thoracic aorta is  atheromatous.          Impression:      1. Chronic interstitial lung disease.  2. Superimposed interstitial infiltrate in the mid and lower lung zones,  likely pulmonary edema.  3. Small right pleural effusion.           This report was signed and finalized on 9/19/2024 7:23 PM by Dr. Ramos Lagos MD.             LAB RESULTS:      Lab 10/07/24  0403 10/01/24  0435 10/01/24  0339   WBC 8.27 7.31  --    HEMOGLOBIN 10.2* 11.1*  --    HEMATOCRIT 35.1* 37.3*  --    PLATELETS 249 182  --    NEUTROS ABS 6.90  --   --    IMMATURE GRANS (ABS) 0.17*  --   --    LYMPHS ABS 0.45*  --   --    MONOS ABS 0.71  --   --    EOS ABS 0.02  --   --    MCV 95.6 95.6  --    PROCALCITONIN  --   --  0.18         Lab 10/07/24  0403 10/01/24  0339   SODIUM 138 136   POTASSIUM 5.6* 4.8   CHLORIDE 103 106   CO2 25.0 18.0*   ANION GAP 10.0 12.0   BUN 42* 37*   CREATININE 1.77* 1.59*   EGFR 37.9* 43.1*   GLUCOSE 120* 130*   CALCIUM 10.3 10.0             Lab 10/01/24  0339   PROBNP 1,967.0*                 Brief Urine Lab Results  (Last result in the past 365 days)        Color   Clarity   Blood   Leuk Est   Nitrite   Protein   CREAT   Urine HCG        09/19/24 1943 Yellow   Cloudy   Negative   Small (1+)   Negative   30 mg/dL (1+)                 Microbiology Results (last 10 days)       ** No results found for the last 240 hours. **            Hospital Course:   I am told that LTAC is going to take him today.  Patient is hemodynamically stable.  I spoke to Dr. Crocker who clears the patient is well for discharge to LTAC.  He is on 6 L nasal cannula typically uses only 3 liters prior to this admission.    This is my first encounter with him.  He has been in the hospital for 16 days.    He presented with shortness of breath.  He was admitted with provisional diagnosis  of  Acute on chronic hypoxic respiratory failure  Recent COVID infection  Chronic diastolic heart failure  COPD  Other medical issues includes:  Pulmonary fibrosis  Stage IIIb chronic kidney disease  Malignant neoplasm of upper lobe right lung  Essential hypertension    Consults:  Oncology  Palliative care  ENT  Pulmonary    Hospital course as per documentation by Dr. Carbajal:    10/1 Patient dyspneic at rest continues to require 7 to 10 L nasal cannula.  He has had epistaxis that is now resolved.  10/02 Patient has ambulated with pulse oximetry and had significant hypoxemia requiring 15 L oxygen nasal cannula to recover.  Although he does appear at baseline.  10/03 No new complaints.  Nasal congestion is improving no further epistaxis.  Trying to discuss poor prognosis patient seems evasive.  Will ask for a palliative care consult.  10/04 Patient resting comfortably.  States his breathing is feeling better but he is still having high flow oxygen requirements.  Has requested to follow-up with his oncologist.  Moved bowels with formed stools and a little bit of rectal irritation.    COPD with acute on chronic hypoxic respiratory failure-pulmonary followed the patient.  Currently on 6 L oxygen typically he is only on 3 L nasal cannula.  Medications as outlined below.    From hospitalist note reviewed, patient is acute on chronic respiratory failure with hypoxia.  There is an underlying ILD documented and recent COVID.  There was concern for possible secondary bacterial pneumonia thus patient received ceftriaxone and azithromycin.    Metastatic non-small cell lung cancer adenocarcinoma stage IV-cannot rule out progression of disease. .  Also concerned about lymphangitic carcinomatosis.  Oncology followed the patient. Record indicates not a candidate to continue antineoplastic therapy at this time.  Palliative care consulted.  Patient's CODE STATUS no CPR limited support (no intubation, no permanent tube feeding).   "Prognosis is poor long-term secondary to stage IV metastatic adenocarcinoma of the lung, COPD, acute on chronic respiratory failure, CKD, pulmonary MOST  form was documented in reportedly currently on file (August 5, 2024).      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  Not amenable to clot evacuation due to risks of recurrent epistaxis.  Treated with local measures.  Dr. Prado's assessment and plan of care noted reviewed.    Chronic rhinitis-patient has chronic rhinitis, partially related to his nasal O2.  He should continue nasal hygiene with nasal saline.  Nasal obstruction-much improved since the left crust has been removed.  He should continue nasal saline  Respiratory failure-remains on nasal O2.  Per primary team  Discussed Plan with patient  Following patient as in-patient. Further recommendations will be made based on serial examinations.    At some point during early part of hospitalization it is mentioned that patient has acute on chronic preserved EF CHF-open \"hard to say fully\".  Treated with diuresis.  Patient is only on Toprol-XL, empagliflozin.  Was not placed on any additional goal-directed regimen due to renal dysfunction.    Today, I noticed that creatinine is 1.77 in comparison to 1.59 on October 1.  I recommend closely following renal function.  In addition to this patient's potassium is 5.6.  They gave a dose of Lokelma.  Earlier on, I noticed that patient is on a regular diet.  Since patient is going to LTAC, I suppose the goal is aggressive.  Therefore, consider changing to renal diet or even healthy heart diet.  I recommend follow-up potassium since patient has hyperkalemia and treated with Lokelma.      History of hypertension-takes Toprol and Imdur.  Most recent vital signs are 138/69, pulse rate 88.          The interested reader is referred to multiple progress notes from different services.  I tried " "to summarize as much as I could from progress notes reviewed and recommendations by consultants.      Note: I also noticed the patient is on Norco.  I am not exactly sure from records reviewed what it was for but he has been taking this regularly 3 times a day.  I do not know if he has cancer related pain.      Patient is medically stable for discharge to LTAC.        Physical Exam on Discharge:  /69 (BP Location: Left arm, Patient Position: Lying)   Pulse 88   Temp 97.8 °F (36.6 °C) (Oral)   Resp 20   Ht 162.6 cm (64\")   Wt 71.3 kg (157 lb 1.6 oz)   SpO2 98%   BMI 26.97 kg/m²   Physical Exam  Not in any distress  Wearing oxygen at 6 L nasal cannula with saturation at 90%  GEN: Awake, alert, interactive, in NAD  HEENT: abNormal left eye.  Lungs: CTAB, no gross adventitious sounds   heart: RRR, +S1/s2, no gross murmur   noted presence of Mediport on the right upper chest  ABD: soft, nt/nd, +BS, no guarding/rebound  Extremities: atraumatic, no cyanosis, + edema  Skin: no rashes or lesions  Neuro: AAOx3, no focal deficits  Psych: normal mood & affect        Condition on Discharge: Fair    Discharge Disposition:  Long Term Care (DC - External)    Discharge Medications:     Discharge Medications        New Medications        Instructions Start Date   albuterol (2.5 MG/3ML) 0.083% nebulizer solution  Commonly known as: PROVENTIL  Replaces: albuterol sulfate  (90 Base) MCG/ACT inhaler   2.5 mg, Nebulization, 4 Times Daily PRN      Breztri Aerosphere 160-9-4.8 MCG/ACT aerosol inhaler  Generic drug: Budeson-Glycopyrrol-Formoterol   2 puffs, Inhalation, 2 Times Daily      budesonide-formoterol 160-4.5 MCG/ACT inhaler  Commonly known as: SYMBICORT   2 puffs, Inhalation, 2 Times Daily - RT      busPIRone 5 MG tablet  Commonly known as: BUSPAR   5 mg, Oral, 3 Times Daily With Meals      cetirizine 10 MG tablet  Commonly known as: zyrTEC   10 mg, Oral, Daily   Start Date: October 8, 2024     diphenhydrAMINE 25 " mg capsule  Commonly known as: BENADRYL   25 mg, Oral, Nightly PRN      guaiFENesin 600 MG 12 hr tablet  Commonly known as: MUCINEX   600 mg, Oral, Every 12 Hours Scheduled      HYDROcodone-acetaminophen 5-325 MG per tablet  Commonly known as: NORCO   1 tablet, Oral, 3 Times Daily      ipratropium-albuterol 0.5-2.5 mg/3 ml nebulizer  Commonly known as: DUO-NEB   3 mL, Nebulization, 4 Times Daily - RT      nitroglycerin 0.4 MG SL tablet  Commonly known as: NITROSTAT   0.4 mg, Sublingual, Every 5 Minutes PRN, Take no more than 3 doses in 15 minutes.      predniSONE 20 MG tablet  Commonly known as: DELTASONE   40 mg, Oral, Daily With Breakfast   Start Date: October 8, 2024     Proctofoam HC 1-1 % rectal foam  Generic drug: Hydrocort-Pramoxine (Perianal)   1 Application, Rectal, Daily   Start Date: October 8, 2024     sodium chloride 0.65 % nasal spray   2 sprays, Nasal, 5 Times Daily      sodium chloride 0.65 % nasal spray   2 sprays, Nasal, Continuous PRN      traZODone 50 MG tablet  Commonly known as: DESYREL   25 mg, Oral, Nightly PRN      zolpidem 5 MG tablet  Commonly known as: AMBIEN   2.5 mg, Oral, Nightly PRN             Changes to Medications        Instructions Start Date   metoprolol succinate XL 50 MG 24 hr tablet  Commonly known as: Toprol XL  What changed:   how much to take  Another medication with the same name was removed. Continue taking this medication, and follow the directions you see here.   25 mg, Oral, Daily             Continue These Medications        Instructions Start Date   acetaminophen 500 MG tablet  Commonly known as: TYLENOL   1,000 mg, Oral, Nightly      aspirin 81 MG EC tablet   81 mg, Oral, Daily      atorvastatin 20 MG tablet  Commonly known as: LIPITOR   20 mg, Oral, Nightly      dextromethorphan polistirex ER 30 MG/5ML Suspension Extended Release oral suspension  Commonly known as: DELSYM   60 mg, Oral, Every 12 Hours PRN      empagliflozin 10 MG tablet tablet  Commonly known as:  Jardiance   10 mg, Oral, Daily      fluticasone 50 MCG/ACT nasal spray  Commonly known as: FLONASE   1 spray, Nasal, 2 Times Daily      isosorbide mononitrate 30 MG 24 hr tablet  Commonly known as: IMDUR   30 mg, Oral, Daily, Takes before lunch       melatonin 5 MG tablet tablet   10 mg, Oral, Nightly      multivitamin with minerals tablet tablet   1 tablet, Oral, Daily      pantoprazole 40 MG EC tablet  Commonly known as: Protonix   40 mg, Oral, Daily      sodium bicarbonate 650 MG tablet   650 mg, Oral, 3 Times Daily      tamsulosin 0.4 MG capsule 24 hr capsule  Commonly known as: FLOMAX   1 capsule, Oral, Nightly             Stop These Medications      albuterol sulfate  (90 Base) MCG/ACT inhaler  Commonly known as: PROVENTIL HFA;VENTOLIN HFA;PROAIR HFA  Replaced by: albuterol (2.5 MG/3ML) 0.083% nebulizer solution     bumetanide 0.5 MG tablet  Commonly known as: BUMEX     Stiolto Respimat 2.5-2.5 MCG/ACT aerosol solution inhaler  Generic drug: tiotropium bromide-olodaterol              This patient has current or prior documentation of an left ventricular ejection fraction (LVEF) of less than or equal to 40%.-Not applicable    Results for orders placed during the hospital encounter of 06/29/24    Adult Stress Echo W/ Cont or Stress Agent if Necessary Per Protocol    Interpretation Summary    Overall, this is an intermediate risk test for ischemia.    No ECG evidence of myocardial ischemia. Negative clinical evidence of myocardial ischemia. Findings consistent with a normal ECG stress test.    Abnormal stress echo consistent with an intermediate risk study for myocardial ischemia.    Left ventricular systolic function is normal. Left ventricular ejection fraction appears to be 61 - 65%.    The following left ventricular wall segments are hypokinetic: apical septal and apex hypokinetic.        Discharge Diet:   Diet Instructions       Diet: Regular/House Diet, Cardiac Diets; Healthy Heart (2-3 Na+); Thin  (IDDSI 0)      Discharge Diet:  Regular/House Diet  Cardiac Diets       Cardiac Diet: Healthy Heart (2-3 Na+)    Fluid Consistency: Thin (IDDSI 0)            Activity at Discharge:   Activity Instructions       Gradually Increase Activity Until at Pre-Hospitalization Level              Follow-up Appointments:   Future Appointments   Date Time Provider Department Center   10/18/2024 11:00 AM [facility] PAD DEVICE CHECK [facility] CD PAD PAD   10/18/2024 11:30 AM Carlitos Bowen MD [facility] CD PAD PAD   11/4/2024  3:30 PM Tarik Crocker MD [facility] RD PAD PAD   1/7/2025  1:30 PM Danny Cool III, MD [facility] RO PAD PAD       Test Results Pending at Discharge: None    Electronically signed by Lencho Macias MD, 10/07/24, 13:37 CDT.    Time: Greater then 30 minutes.

## 2024-10-08 ENCOUNTER — APPOINTMENT (OUTPATIENT)
Dept: GENERAL RADIOLOGY | Facility: HOSPITAL | Age: 84
End: 2024-10-08
Payer: COMMERCIAL

## 2024-10-08 PROBLEM — Z87.01 HISTORY OF PNEUMONIA: Status: ACTIVE | Noted: 2024-10-08

## 2024-10-08 PROBLEM — Z99.81 SUPPLEMENTAL OXYGEN DEPENDENT: Status: ACTIVE | Noted: 2024-10-08

## 2024-10-08 LAB
ALBUMIN SERPL-MCNC: 2.8 G/DL (ref 3.5–5.2)
ALBUMIN/GLOB SERPL: 0.8 G/DL
ALP SERPL-CCNC: 114 U/L (ref 39–117)
ALT SERPL W P-5'-P-CCNC: 30 U/L (ref 1–41)
ANION GAP SERPL CALCULATED.3IONS-SCNC: 9 MMOL/L (ref 5–15)
ARTERIAL PATENCY WRIST A: ABNORMAL
AST SERPL-CCNC: 22 U/L (ref 1–40)
ATMOSPHERIC PRESS: 753 MMHG
BASE EXCESS BLDA CALC-SCNC: 2.9 MMOL/L (ref 0–2)
BASOPHILS # BLD AUTO: 0.02 10*3/MM3 (ref 0–0.2)
BASOPHILS NFR BLD AUTO: 0.2 % (ref 0–1.5)
BDY SITE: ABNORMAL
BILIRUB SERPL-MCNC: 0.3 MG/DL (ref 0–1.2)
BODY TEMPERATURE: 37
BUN SERPL-MCNC: 44 MG/DL (ref 8–23)
BUN/CREAT SERPL: 28 (ref 7–25)
CALCIUM SPEC-SCNC: 10.1 MG/DL (ref 8.6–10.5)
CHLORIDE SERPL-SCNC: 103 MMOL/L (ref 98–107)
CO2 SERPL-SCNC: 26 MMOL/L (ref 22–29)
CREAT SERPL-MCNC: 1.57 MG/DL (ref 0.76–1.27)
DEPRECATED RDW RBC AUTO: 60.6 FL (ref 37–54)
EGFRCR SERPLBLD CKD-EPI 2021: 43.7 ML/MIN/1.73
EOSINOPHIL # BLD AUTO: 0 10*3/MM3 (ref 0–0.4)
EOSINOPHIL NFR BLD AUTO: 0 % (ref 0.3–6.2)
ERYTHROCYTE [DISTWIDTH] IN BLOOD BY AUTOMATED COUNT: 17.5 % (ref 12.3–15.4)
GAS FLOW AIRWAY: 6 LPM
GLOBULIN UR ELPH-MCNC: 3.3 GM/DL
GLUCOSE SERPL-MCNC: 146 MG/DL (ref 65–99)
HCO3 BLDA-SCNC: 28.5 MMOL/L (ref 20–26)
HCT VFR BLD AUTO: 37.2 % (ref 37.5–51)
HGB BLD-MCNC: 10.8 G/DL (ref 13–17.7)
IMM GRANULOCYTES # BLD AUTO: 0.25 10*3/MM3 (ref 0–0.05)
IMM GRANULOCYTES NFR BLD AUTO: 3 % (ref 0–0.5)
LYMPHOCYTES # BLD AUTO: 0.32 10*3/MM3 (ref 0.7–3.1)
LYMPHOCYTES NFR BLD AUTO: 3.9 % (ref 19.6–45.3)
Lab: ABNORMAL
MCH RBC QN AUTO: 27.8 PG (ref 26.6–33)
MCHC RBC AUTO-ENTMCNC: 29 G/DL (ref 31.5–35.7)
MCV RBC AUTO: 95.6 FL (ref 79–97)
MODALITY: ABNORMAL
MONOCYTES # BLD AUTO: 0.51 10*3/MM3 (ref 0.1–0.9)
MONOCYTES NFR BLD AUTO: 6.2 % (ref 5–12)
NEUTROPHILS NFR BLD AUTO: 7.18 10*3/MM3 (ref 1.7–7)
NEUTROPHILS NFR BLD AUTO: 86.7 % (ref 42.7–76)
NRBC BLD AUTO-RTO: 0.2 /100 WBC (ref 0–0.2)
PCO2 BLDA: 47.1 MM HG (ref 35–45)
PCO2 TEMP ADJ BLD: 47.1 MM HG (ref 35–45)
PH BLDA: 7.39 PH UNITS (ref 7.35–7.45)
PH, TEMP CORRECTED: 7.39 PH UNITS (ref 7.35–7.45)
PLATELET # BLD AUTO: 240 10*3/MM3 (ref 140–450)
PMV BLD AUTO: 11.5 FL (ref 6–12)
PO2 BLDA: 67.2 MM HG (ref 83–108)
PO2 TEMP ADJ BLD: 67.2 MM HG (ref 83–108)
POTASSIUM SERPL-SCNC: 5.4 MMOL/L (ref 3.5–5.2)
PREALB SERPL-MCNC: 20.3 MG/DL (ref 20–40)
PROT SERPL-MCNC: 6.1 G/DL (ref 6–8.5)
RBC # BLD AUTO: 3.89 10*6/MM3 (ref 4.14–5.8)
SAO2 % BLDCOA: 93.1 % (ref 94–99)
SODIUM SERPL-SCNC: 138 MMOL/L (ref 136–145)
VENTILATOR MODE: ABNORMAL
WBC NRBC COR # BLD AUTO: 8.28 10*3/MM3 (ref 3.4–10.8)

## 2024-10-08 PROCEDURE — 85025 COMPLETE CBC W/AUTO DIFF WBC: CPT | Performed by: INTERNAL MEDICINE

## 2024-10-08 PROCEDURE — 80053 COMPREHEN METABOLIC PANEL: CPT | Performed by: INTERNAL MEDICINE

## 2024-10-08 PROCEDURE — 82803 BLOOD GASES ANY COMBINATION: CPT

## 2024-10-08 PROCEDURE — 97162 PT EVAL MOD COMPLEX 30 MIN: CPT | Performed by: PHYSICAL THERAPIST

## 2024-10-08 PROCEDURE — 63710000001 PREDNISONE PER 1 MG: Performed by: INTERNAL MEDICINE

## 2024-10-08 PROCEDURE — 97166 OT EVAL MOD COMPLEX 45 MIN: CPT | Performed by: OCCUPATIONAL THERAPIST

## 2024-10-08 PROCEDURE — 25010000002: Performed by: INTERNAL MEDICINE

## 2024-10-08 PROCEDURE — 99222 1ST HOSP IP/OBS MODERATE 55: CPT | Performed by: INTERNAL MEDICINE

## 2024-10-08 PROCEDURE — 71045 X-RAY EXAM CHEST 1 VIEW: CPT

## 2024-10-08 PROCEDURE — 84134 ASSAY OF PREALBUMIN: CPT | Performed by: INTERNAL MEDICINE

## 2024-10-08 RX ORDER — BUDESONIDE 0.5 MG/2ML
0.5 INHALANT ORAL
Status: DISCONTINUED | OUTPATIENT
Start: 2024-10-08 | End: 2024-11-01 | Stop reason: HOSPADM

## 2024-10-08 NOTE — THERAPY DISCHARGE NOTE
Acute Care - Physical Therapy Discharge Summary  [facility]       Patient Name: Karoline Prater  : 1940  MRN: [id number]    Today's Date: 10/8/2024                 Admit Date: 2024      PT Recommendation and Plan    Visit Dx:    ICD-10-CM ICD-9-CM   1. Hypoxia  R09.02 799.02   2. Pneumonitis  J98.4 486   3. Acute UTI  N39.0 599.0   4. Impaired mobility [Z74.09]  Z74.09 799.89   5. Chronic obstructive pulmonary disease, unspecified COPD type  J44.9 496   6. Encounter for palliative care  Z51.5 V66.7   7. Chronic heart failure with preserved ejection fraction (HFpEF)  I50.32 428.9                PT Rehab Goals       Row Name 10/08/24 0600             Bed Mobility Goal 1 (PT)    Activity/Assistive Device (Bed Mobility Goal 1, PT) sit to supine/supine to sit  -AB      [place] Level/Cues Needed (Bed Mobility Goal 1, PT) supervision required  -AB      Time Frame (Bed Mobility Goal 1, PT) long term goal (LTG);10 days  -AB      Progress/Outcomes (Bed Mobility Goal 1, PT) good progress toward goal;goal not met  -AB         Transfer Goal 1 (PT)    Activity/Assistive Device (Transfer Goal 1, PT) sit-to-stand/stand-to-sit;bed-to-chair/chair-to-bed  -AB      [place] Level/Cues Needed (Transfer Goal 1, PT) supervision required  -AB      Time Frame (Transfer Goal 1, PT) long term goal (LTG);10 days  -AB      Progress/Outcome (Transfer Goal 1, PT) goal not met  -AB         Gait Training Goal 1 (PT)    Activity/Assistive Device (Gait Training Goal 1, PT) gait (walking locomotion);assistive device use;decrease fall risk;improve balance and speed;increase endurance/gait distance;walker, rolling  -AB      [place] Level (Gait Training Goal 1, PT) supervision required  -AB      Distance (Gait Training Goal 1, PT) 60 ft w O2 sat 90% or greater on scheduled O2  -AB      Time Frame (Gait Training Goal 1, PT) long term goal (LTG);10 days  -AB      Progress/Outcome (Gait Training Goal 1, PT) goal not met  -AB                 User Key  (r) = Recorded By, (t) = Taken By, (c) = Cosigned By      Initials Name Provider Type Discipline    AB Daria Edwards, PTA Physical Therapist Assistant PT                        PT Discharge Summary  Reason for Discharge: Discharge from facility  Outcomes Achieved: Refer to plan of care for updates on goals achieved  Discharge Destination: [facility]      Daria Edwards PTA   10/8/2024

## 2024-10-08 NOTE — H&P
Timbo Izquierdo M.D.  MARITZA Frias          Internal Medicine History and Physical      Name: Karoline Prater  MRN: [id number]     Acct: [id number]  Room: 1/    Admit Date: 10/7/2024  PCP: Irving Alexis MD    Chief Complaint:     Weakness, shortness of breath    History Obtained From:     chart review and the patient    History of Present Illness:      Karoline Prater is a  84 y.o.  male who presents with need for continued oxygen weaning and rehabilitation efforts following a recent acute care stay. The patient, with a history of stage 4 lung cancer and COPD as well as atrial fibrillation s/p pacemaker, had been in his usual state of health when he contracted COVID on 9/5/24 and he was treated in the hospital and discharged on 9/8/24. He returned to ER on 9/20 with increased shortness of breath and was found to have temp of 101.3. He was placed on vapotherm to maintain adequate 02 sats and COVID PCR was found to be positive. He was diuresed due to evidence of fluid volume overload. Patient maintained adequate 02 sats with vapotherm, but would desat with any exertion. He was placed on IV antibiotics for concern for superimposed bacterial infection. He developed intermittent epistaxis and was evaluated by ENT who recommended continued nasal hygiene. The patient was evaluated by oncology who does not feel patient is a candidate for antineoplastic therapy at this time. Due to his need for continued oxygen weaning and rehabilitation efforts, he transferred to our facility.     Past Medical History:     Past Medical History:   Diagnosis Date    Arthritis     Atrial fibrillation with rapid ventricular response 05/31/2019    Blindness of left eye     BPH with obstruction/lower urinary tract symptoms     Cancer     stomach & lung    CHF (congestive heart failure)     CKD (chronic kidney disease) stage 3, GFR 30-59 ml/min     COPD with acute exacerbation 08/03/2024     Coronary artery disease     Elevated cholesterol     Essential hypertension 10/02/2017    Fibrotic lung diseases     GERD (gastroesophageal reflux disease)     Hearing loss     History of transfusion     Hydronephrosis of left kidney     Hyperlipidemia     Hypertension     pt was taken off of all of his medications for BP (atenolol, lisinopril, lasix) because his BP kept bottoming out so his primary dr told him to discontinue them 1-2 months ago (Jan/Feb 2019). pt states he takes no medications currently.    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 Patel is just going to keep an eye on it and not do surgery right now.    Mediastinal adenopathy 10/24/2018    [facility]    Monoclonal gammopathy of unknown significance (MGUS) 09/11/2018    Pancreatic mass     pt states he had this in 2013 but it went away on its own. Now recent CT shows it has come back so he is going to have an ultrasound on 3/13/19.    Shortness of breath         Past Surgical History:     Past Surgical History:   Procedure Laterality Date    ABDOMINAL SURGERY      BRONCHOSCOPY N/A 10/24/2018    Procedure: BRONCHOSCOPY WITH BIOPSY, EBUS;  Surgeon: Gareth Becerra MD;  Location: [facility] OR;  Service: Pulmonary    CHOLECYSTECTOMY      COLONOSCOPY N/A 1/3/2019    Procedure: COLONOSCOPY WITH ANESTHESIA;  Surgeon: Randy Somers DO;  Location: [facility] ENDOSCOPY;  Service: Gastroenterology    CYSTOSCOPY, RETROGRADE PYELOGRAM AND STENT INSERTION Left 3/8/2019    Procedure: CYSTOSCOPY RETROGRADE BILATERAL PYELOGRAM;  Surgeon: Jos Sylvester MD;  Location: [facility] OR;  Service: Urology    ENDOSCOPY N/A 12/11/2018    Procedure: ESOPHAGOGASTRODUODENOSCOPY WITH ANESTHESIA;  Surgeon: Randy Somers DO;  Location: [facility] ENDOSCOPY;  Service: Gastroenterology    ENDOSCOPY N/A 4/29/2019     Procedure: ESOPHAGOGASTRODUODENOSCOPY WITH ANESTHESIA;  Surgeon: Lilliam Jj MD;  Location: [facility] OR;  Service: Gastroenterology    ENDOSCOPY N/A 5/9/2019    Procedure: ESOPHAGOGASTRODUODENOSCOPY WITH ANESTHESIA;  Surgeon: Pilo Bansal MD;  Location: [facility] ENDOSCOPY;  Service: Gastroenterology    EYE SURGERY Left 1964    FOOT SURGERY Right 1966    joint    FRACTURE SURGERY      PACEMAKER IMPLANTATION Left 8/29/2024    Procedure: Leadless pacemaker implant and AVN ablation;  Surgeon: Carlitos Bowen MD;  Location: [facility] CATH INVASIVE LOCATION;  Service: Cardiovascular;  Laterality: Left;        Medications Prior to Admission:       Prior to Admission medications    Medication Sig Start Date End Date Taking? Authorizing Provider   acetaminophen (TYLENOL) 500 MG tablet Take 2 tablets by mouth Every Night.    Provider, MD Eliecer   albuterol (PROVENTIL) (2.5 MG/3ML) 0.083% nebulizer solution Take 2.5 mg by nebulization 4 (Four) Times a Day As Needed for Wheezing. Indications: Spasm of Lung Air Passages, Worsening of Chronic Obstructive Lung Disease 10/4/24   Cornelio Gordon DO   aspirin 81 MG EC tablet Take 1 tablet by mouth Daily. 7/4/24   Zachary Lloyd MD   atorvastatin (LIPITOR) 20 MG tablet Take 1 tablet by mouth Every Night. 7/3/24   Zachary Lloyd MD   Budeson-Glycopyrrol-Formoterol (Breztri Aerosphere) 160-9-4.8 MCG/ACT aerosol inhaler Inhale 2 puffs 2 (Two) Times a Day. 10/4/24   Cornelio Gordon DO   budesonide-formoterol (SYMBICORT) 160-4.5 MCG/ACT inhaler Inhale 2 puffs 2 (Two) Times a Day. 10/7/24   Lencho Macias MD   busPIRone (BUSPAR) 5 MG tablet Take 1 tablet by mouth 3 (Three) Times a Day With Meals. 10/7/24   Lencho Macias MD   cetirizine (zyrTEC) 10 MG tablet Take 1 tablet by mouth Daily. 10/8/24   Lencho Macias MD   dextromethorphan polistirex ER (DELSYM) 30 MG/5ML Suspension Extended Release oral suspension Take 10 mL by mouth  Every 12 (Twelve) Hours As Needed (Cough). 9/8/24   Irving Power DO   diphenhydrAMINE (BENADRYL) 25 mg capsule Take 1 capsule by mouth At Night As Needed for Sleep. 10/7/24   Lencho Macias MD   empagliflozin (Jardiance) 10 MG tablet tablet Take 1 tablet by mouth Daily. 9/13/24   Blake Dallas APRN   fluticasone (FLONASE) 50 MCG/ACT nasal spray Administer 1 spray into the nostril(s) as directed by provider 2 (Two) Times a Day.    Eliecer Schultz MD   guaiFENesin (MUCINEX) 600 MG 12 hr tablet Take 1 tablet by mouth Every 12 (Twelve) Hours. 10/7/24   Lencho Macias MD   HYDROcodone-acetaminophen (NORCO) 5-325 MG per tablet Take 1 tablet by mouth 3 (Three) Times a Day for 15 doses. 10/7/24 10/12/24  Lencho Macias MD   Hydrocort-Pramoxine, Perianal, (Proctofoam HC) 1-1 % rectal foam Insert 1 Application into the rectum Daily. 10/8/24   Lencho Macias MD   ipratropium-albuterol (DUO-NEB) 0.5-2.5 mg/3 ml nebulizer Take 3 mL by nebulization 4 (Four) Times a Day. 10/7/24   Lencho Macias MD   isosorbide mononitrate (IMDUR) 30 MG 24 hr tablet Take 1 tablet by mouth Daily. Takes before lunch    Eliecer Schultz MD   melatonin 5 MG tablet tablet Take 2 tablets by mouth Every Night.    Eliecer Schultz MD   metoprolol succinate XL (Toprol XL) 50 MG 24 hr tablet Take 0.5 tablets by mouth Daily. 10/7/24   Lencho Macias MD   multivitamin with minerals tablet tablet Take 1 tablet by mouth Daily.    Eliecer Schultz MD   nitroglycerin (NITROSTAT) 0.4 MG SL tablet Place 1 tablet under the tongue Every 5 (Five) Minutes As Needed for Chest Pain (Only if SBP Greater Than 100). Take no more than 3 doses in 15 minutes. 10/7/24   Lencho Macias MD   pantoprazole (PROTONIX) 40 MG EC tablet Take 1 tablet by mouth Daily. 4/30/19   Hang Reddy DO   predniSONE (DELTASONE) 20 MG tablet Take 2 tablets by mouth Daily With Breakfast  for 1 dose. 10/8/24 10/9/24  Lencho Macias MD   sodium bicarbonate 650 MG tablet Take 1 tablet by mouth 3 (Three) Times a Day.    Provider, MD Eliecer   sodium chloride 0.65 % nasal spray Administer 2 sprays into the nostril(s) as directed by provider 5 (Five) Times a Day. 10/7/24   Lencho Macias MD   sodium chloride 0.65 % nasal spray Administer 2 sprays into the nostril(s) as directed by provider Continuous As Needed for Congestion (as desired frequently). 10/7/24   Lencho Macias MD   tamsulosin (FLOMAX) 0.4 MG capsule 24 hr capsule Take 1 capsule by mouth Every Night.    Provider, MD Eliecer   traZODone (DESYREL) 50 MG tablet Take 0.5 tablets by mouth At Night As Needed for Sleep or Depression. 10/7/24   Lencho Macias MD   zolpidem (AMBIEN) 5 MG tablet Take 0.5 tablets by mouth At Night As Needed for Sleep for up to 1 day. 10/7/24 10/8/24  Lencho Macias MD        Allergies:       Penicillins    Social History:     Tobacco:    reports that he quit smoking about 20 years ago. His smoking use included cigarettes. He started smoking about 69 years ago. He quit smokeless tobacco use about 54 years ago.  His smokeless tobacco use included chew.  Alcohol:      reports no history of alcohol use.  Drug Use:  reports no history of drug use.    Family History:     Family History   Problem Relation Age of Onset    Hypertension Mother     Leukemia Father        Review of Systems:     Review of Systems   Constitutional: Positive for malaise/fatigue. Negative for chills, decreased appetite, weight gain and weight loss.   HENT:  Negative for congestion, ear discharge, hoarse voice and tinnitus.    Eyes:  Negative for blurred vision, discharge, visual disturbance and visual halos.   Cardiovascular:  Positive for dyspnea on exertion. Negative for chest pain, claudication, irregular heartbeat, leg swelling, orthopnea and paroxysmal nocturnal dyspnea.   Respiratory:  " Positive for shortness of breath. Negative for cough, sputum production and wheezing.    Endocrine: Negative for cold intolerance, heat intolerance and polyuria.   Hematologic/Lymphatic: Negative for adenopathy. Does not bruise/bleed easily.   Skin:  Negative for dry skin, itching and suspicious lesions.   Musculoskeletal:  Positive for muscle weakness. Negative for arthritis, back pain, falls, joint pain and myalgias.   Gastrointestinal:  Negative for abdominal pain, constipation, diarrhea, dysphagia and hematemesis.   Genitourinary:  Negative for bladder incontinence, dysuria and frequency.   Neurological:  Positive for weakness. Negative for aphonia, disturbances in coordination and dizziness.   Psychiatric/Behavioral:  Negative for altered mental status, depression, memory loss and substance abuse. The patient does not have insomnia and is not nervous/anxious.        Code Status:    There are no questions and answers to display.       Physical Exam:     Vitals:  Ht 162.6 cm (64\")   Wt 71.3 kg (157 lb 3 oz)   BMI 26.98 kg/m²   Temp (24hrs), Av.8 °F (36.6 °C), Min:97.8 °F (36.6 °C), Max:97.8 °F (36.6 °C)  T 97.6 P 84 R 23 /84 Sp02 96% (6 lpm)  Physical Exam  Vitals and nursing note reviewed.   Constitutional:       Appearance: Normal appearance.   HENT:      Head: Normocephalic and atraumatic.      Right Ear: External ear normal.      Left Ear: External ear normal.      Nose: Nose normal.      Mouth/Throat:      Mouth: Mucous membranes are dry.   Eyes:      Extraocular Movements: Extraocular movements intact.      Conjunctiva/sclera: Conjunctivae normal.      Pupils: Pupils are equal, round, and reactive to light.      Comments: Left eye enucleated   Cardiovascular:      Rate and Rhythm: Normal rate and regular rhythm.      Pulses: Normal pulses.      Heart sounds: Normal heart sounds.   Pulmonary:      Effort: Pulmonary effort is normal.      Breath sounds: Normal breath sounds.   Abdominal:      " General: Bowel sounds are normal.      Palpations: Abdomen is soft.   Musculoskeletal:      Cervical back: Normal range of motion and neck supple.      Comments: Generalized weakness   Skin:     General: Skin is warm and dry.   Neurological:      Mental Status: He is alert and oriented to person, place, and time.   Psychiatric:         Mood and Affect: Mood normal.         Behavior: Behavior normal.               Data:     Lab Results (last 7 days)       Procedure Component Value Units Date/Time    Blood Gas, Arterial - [[id number]]  (Abnormal) Collected: 10/08/24 0436    Specimen: Arterial Blood Updated: 10/08/24 0438     Site Right Brachial     Gregg's Test N/A     pH, Arterial 7.390 pH units      pCO2, Arterial 47.1 mm Hg      Comment: 83 Value above reference range        pO2, Arterial 67.2 mm Hg      Comment: 84 Value below reference range        HCO3, Arterial 28.5 mmol/L      Comment: 83 Value above reference range        Base Excess, Arterial 2.9 mmol/L      Comment: 83 Value above reference range        O2 Saturation, Arterial 93.1 %      Comment: 84 Value below reference range        Temperature 37.0     Barometric Pressure for Blood Gas 753 mmHg      Modality Nasal Cannula     Flow Rate 6.0 lpm      Ventilator Mode NA     Collected by MOHINI REILLY RRT     Comment: Meter: [id number]     :  [id number]        pCO2, Temperature Corrected 47.1 mm Hg      pH, Temp Corrected 7.390 pH Units      pO2, Temperature Corrected 67.2 mm Hg     Comprehensive Metabolic Panel [[id number]]  (Abnormal) Collected: 10/08/24 0330    Specimen: Blood Updated: 10/08/24 0412     Glucose 146 mg/dL      BUN 44 mg/dL      Creatinine 1.57 mg/dL      Sodium 138 mmol/L      Potassium 5.4 mmol/L      Chloride 103 mmol/L      CO2 26.0 mmol/L      Calcium 10.1 mg/dL      Total Protein 6.1 g/dL      Albumin 2.8 g/dL      ALT (SGPT) 30 U/L      AST (SGOT) 22 U/L      Alkaline Phosphatase 114 U/L      Total Bilirubin 0.3 mg/dL       Globulin 3.3 gm/dL      A/G Ratio 0.8 g/dL      BUN/Creatinine Ratio 28.0     Anion Gap 9.0 mmol/L      eGFR 43.7 mL/min/1.73     Narrative:      GFR Normal >60  Chronic Kidney Disease <60  Kidney Failure <15    The GFR formula is only valid for adults with stable renal function between ages 18 and 70.    CBC & Differential [[id number]]  (Abnormal) Collected: 10/08/24 0330    Specimen: Blood Updated: 10/08/24 0352    Narrative:      The following orders were created for panel order CBC & Differential.  Procedure                               Abnormality         Status                     ---------                               -----------         ------                     CBC Auto Differential[[id number]]        Abnormal            Final result                 Please view results for these tests on the individual orders.    CBC Auto Differential [[id number]]  (Abnormal) Collected: 10/08/24 0330    Specimen: Blood Updated: 10/08/24 0352     WBC 8.28 10*3/mm3      RBC 3.89 10*6/mm3      Hemoglobin 10.8 g/dL      Hematocrit 37.2 %      MCV 95.6 fL      MCH 27.8 pg      MCHC 29.0 g/dL      RDW 17.5 %      RDW-SD 60.6 fl      MPV 11.5 fL      Platelets 240 10*3/mm3      Neutrophil % 86.7 %      Lymphocyte % 3.9 %      Monocyte % 6.2 %      Eosinophil % 0.0 %      Basophil % 0.2 %      Immature Grans % 3.0 %      Neutrophils, Absolute 7.18 10*3/mm3      Lymphocytes, Absolute 0.32 10*3/mm3      Monocytes, Absolute 0.51 10*3/mm3      Eosinophils, Absolute 0.00 10*3/mm3      Basophils, Absolute 0.02 10*3/mm3      Immature Grans, Absolute 0.25 10*3/mm3      nRBC 0.2 /100 WBC     Prealbumin [[id number]] Collected: 10/08/24 0330    Specimen: Blood Updated: 10/08/24 0348          XR Chest 1 View    Result Date: 10/8/2024  Narrative: Frontal supine radiograph of the chest 10/8/2024 2:30 AM  History: s/p COVID and resp failure  Comparison: 9/30/2024  Findings: Lines and tubes are stable in position. No new opacities or pneumothoraces  are visualized in the chest. The cardiomediastinal silhouette and pulmonary vascularity are unchanged.   No acute osseous or soft tissue abnormality is noted.      Impression: Impression: 1. No significant interval change since previous exam.    This report was signed and finalized on 10/8/2024 7:09 AM by Dr. Naveed Reynolds MD.      CT Chest Without Contrast Diagnostic    Addendum Date: 10/7/2024 Addendum:   ADDENDUM: Request from Dr. Michele's office for additional comparison with the earlier imaging examinations, comparison is made with PET/CT 8/20/2024, CT chest without contrast 8/3/2024, CT chest without contrast 6/29/2024, CT chest without contrast 5/6/2024.  Measurements of the spiculated mass in the right upper lobe:  CT chest without contrast 5/6/2024: Approximately 4.0 x 2.2 cm  CT chest without contrast 6/29/202, Approximately 4.7 x 2.1 cm  CT chest without contrast 8/3/2024: Approximately 4.9 x 4.2 cm  PET/CT 8/20/2024: Approximately 3.6 x 1.8 cm.  CT chest without contrast 9/21/2024: Approximately 4.2 x 3.6 cm.  The mass was slowly increasing in size from May, 2024 through August 3, 2024, then it was smaller on the PET/CT from 8/20/2024, which could be a positive treatment response. The mass then increased in size on 9/21/2024 and that measurement includes more confluent soft tissue attenuation against the lateral pleural surface, which could be due to increased volume of tumor tissue, or confluent surrounding infiltrate or progressive fibrosis. A repeat PET/CT could help determine if the increased size and volume of the mass is related to avid tumor or due to an increase in surrounding infiltrate and fibrosis.  This report was signed and finalized on 10/7/2024 3:44 PM by Dr. Allen Churchill MD.      Result Date: 10/7/2024  Narrative: CT CHEST WO CONTRAST DIAGNOSTIC- 9/21/2024 11:26 AM  HISTORY: Pneumonia; R09.02-Hypoxemia; J98.4-Other disorders of lung; N39.0-Urinary tract infection, site not  specified; Z74.09-Other reduced mobility  COMPARISON: 8/3/2024  TOTAL DOSE LENGTH PRODUCT: 164.12 mGy.cm. Automated exposure control was also utilized to decrease patient radiation dose.  TECHNIQUE: Axial images of the chest are performed without IV contrast.  FINDINGS: Similar mildly enlarged mediastinal lymph nodes partially calcified measuring up to 1.5 cm in short axis no axillary lymphadenopathy. Right internal jugular port in place with the tip present in the SVC. Moderate vascular calcification with no thoracic aortic aneurysm. Dense diffuse coronary calcifications. Leadless cardiac pacer device. Cardiomegaly. No pericardial or pleural effusion. Diffuse right slight nodular pleural thickening.  Images of the upper abdomen redemonstrate pneumobilia. No adrenal nodule. Moderate stool of the visible colon. Cortical renal atrophy.  Spiculated mass like is very similar to the 8/3/2024 study measuring approximately 4.2 x 3.6 x 2.0 cm (8/3/2024 measurement 4.9 x 4.2 cm, 6/29/2024 measurement 4.7 x 2.1 cm). Diffuse chronic interstitial lung disease with bronchiectasis peripheral honeycombing favoring usual interstitial pneumonitis/interstitial pulmonary fibrosis. There is similar appearing scarring of the right lung apex with associated volume loss. A similar 2 x 1 cm irregular nodule stable of the right middle lobe (series 3 image 71). No pneumothorax. No discrete endobronchial lesion.  Osteopenia of the regional skeleton. No focal aggressive regional bony lesions identified. Exaggerated kyphosis of the degenerative thoracic curvature.      Impression:  1. Spiculated masslike opacity of the right upper lobe, similar to only minimally decreased compared to 8/3/2024, increased compared to 6/29/2024. Neoplastic process must be considered. Stable scarring suspected of the right lung apex. Nonspecific irregular 2 x 1 cm nodular focus right middle lobe for which continued imaging surveillance recommended. Advanced chronic  interstitial lung disease with bronchiectasis. No pneumothorax. Diffusely irregular right pleural thickening remains stable.  This report was signed and finalized on 9/21/2024 1:13 PM by Dr. Betsey Wells MD.      CT Angiogram Chest    Result Date: 10/3/2024  Narrative: EXAM: CT ANGIOGRAM CHEST- - 10/3/2024 12:24 PM  HISTORY: hypoxia, PE eval; R09.02-Hypoxemia; J98.4-Other disorders of lung; N39.0-Urinary tract infection, site not specified; Z74.09-Other reduced mobility; J44.9-Chronic obstructive pulmonary disease, unspecified. 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  COMPARISON: 9/21/2024.  DOSE LENGTH PRODUCT: 366.39 mGy.cm. Automatic exposure control was utilized to make radiation dose as low as reasonably achievable.  TECHNIQUE: Enhanced axial CT images obtained with multiplanar reformats. 3D postprocessing, including MIPs, performed and images saved to PACS.  FINDINGS: AIRWAYS/PULMONARY PARENCHYMA: Linear probable secretions in the trachea. Main airways patent. Diffuse small airway thickening.  RIGHT upper lobe masslike opacity measures 4.5 x 3.6 cm, concerning for malignancy. Increased patchy opacities especially in the RIGHT lung. Interlobular septal thickening throughout.  Similar RIGHT middle lobe 2 x 1 cm pulmonary nodule on axial series 7, image 67 compared to 9/21/2024.  Similar RIGHT pleural thickening. No pneumothorax or drainable pleural fluid.  VESSELS: Contrast bolus is adequate. No evidence of pulmonary embolism. Enlarged main pulmonary artery, which can be seen with pulmonary artery hypertension. Aorta normal in course and caliber.  CARDIAC: Normal heart size with scattered coronary artery calcifications. Metallic device at the RIGHT ventricular apex, likely wireless pulse generator. No pericardial effusion.   MEDIASTINUM: Similar mediastinal adenopathy including an AP window lymph node measuring 1.5 cm on axial  series 5, image 46 compared to 9/21/2024.  Esophagus has normal coarse, caliber and wall thickness.  EXTRATHORACIC: The visualized portions of the thyroid gland are unremarkable. No thoracic inlet or axillary adenopathy. Port in the RIGHT chest wall, catheter tip in the mid SVC. There may be a component of chronic RIGHT jugular vein stenosis as there is opacification of collateral vessels in the chest.  INCLUDED UPPER ABDOMEN: Pneumobilia. Otherwise visualized portion of the liver, atrophic pancreas, spleen, adrenal glands appear within normal limits. Nephrolithiasis versus contrast excretion in the kidneys.  OSSEOUS: There are mild degenerative changes of the visualized spine. No acute or suspicious bony finding.       Impression: 1. Similar masslike spiculated opacity at the RIGHT upper lobe. Similar interlobular septal thickening with increased patchy opacities especially in the RIGHT lung. Appearance highly concerning for malignancy with lymphangitic spread of tumor. 2. Similar mild adenopathy. 3. Coronary artery calcifications with metallic device in the RIGHT ventricular apex, favor wireless pulse generator. 4. 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  This report was signed and finalized on 10/3/2024 3:29 PM by Dr Kristen Turpin MD.      Walking Oximetry    Result Date: 10/2/2024  Narrative: Valeria Andrea, RRT     10/2/2024  4:12 PM Patient Name:Karoline Prater MRN: [id number] Date: 10/02/24         ROOM AIR BASELINE SpO2%  Heart Rate  Blood Pressure  EXERCISE ON ROOM AIR SpO2% EXERCISE ON O2 @  LPM SpO2% 1 MINUTE  1 MINUTE  2 MINUTES  2 MINUTES  3 MINUTES  3 MINUTES  4 MINUTES  4 MINUTES  5 MINUTES  5 MINUTES  6 MINUTES  6 MINUTES           Distance Walked   Distance Walked Dyspnea (Pina Scale)   Dyspnea (Pina Scale) Fatigue (Pina Scale)   Fatigue (Pina Scale) SpO2% Post Exercise   SpO2% Post Exercise HR Post Exercise   HR Post Exercise Time to Recovery   Time to  Recovery Comments: Pulse ox on 7 lpm at rest 93%. Walked pt to chair about 4 feet sat dropped to 75% on 10 lpm. Pt recovered to 93% after 10 mins on 15 lpm. Walked pt on 15 lpm for 10 feet sat dropped to 81%. Pt recovered to 93% on 15 lpm. Decreased oxygen back to 7 lpm sat stayed between 91-93%.Exercise Oximetry.    XR Chest 1 View    Result Date: 9/30/2024  Narrative: EXAMINATION: XR CHEST 1 VW- 9/30/2024 1:46 PM  HISTORY: follow-up hypoxemia.  REPORT: A frontal view of the chest was obtained.  COMPARISON: Chest x-ray 9/26/2024. CT chest without contrast 9/21/2024.  There are coarse reticular interstitial opacities throughout both lungs similar to the previous study and likely related to advanced pulmonary fibrosis. There is a superimposed area of stable masslike parenchymal consolidation in the right upper lobe which is unchanged. There is blunting of the right costophrenic angle which may be due to a small pleural effusion or chronic pleural thickening. The right internal jugular port catheter appears in good position as before. Heart size is normal.      Impression: Stable chest x-ray compared with the study from 4 days earlier.  This report was signed and finalized on 9/30/2024 1:49 PM by Dr. Allen Churchill MD.      XR Chest 1 View    Result Date: 9/26/2024  Narrative: EXAMINATION: XR CHEST 1 VW-  9/26/2024 9:52 PM  HISTORY: COVID-pneumonia.  FINDINGS: Today's exam is compared to previous study of 1 week earlier. A tunneled infusion cath remains in place with the tip in the SVC. Diffuse interstitial changes are noted of the lungs which appear to be somewhat chronic in nature. There is blunting of the right lateral costophrenic angle suggesting either pleural thickening or a small effusion. The heart is enlarged. A leadless pacer projects over the left heart border.      Impression: 1.. Increased interstitial markings which appear to be chronic in nature and may represent an element of interstitial fibrosis.  There is chronic blunting of the right lateral costophrenic angle. 2. No acute infiltrate or free air.  This report was signed and finalized on 9/26/2024 9:53 PM by Dr. Naveed Reynolds MD.      XR Chest 1 View    Result Date: 9/19/2024  Narrative: EXAMINATION:  XR CHEST 1 VW-  9/19/2024 6:18 PM  HISTORY: Shortness of air and fever.  COMPARISON: 9/5/2024.  TECHNIQUE: Single view AP image.  FINDINGS: There is underlying interstitial lung disease. There are superimposed interstitial infiltrates in the mid and lower lung zones. There is a small pleural effusion on the right. There is a port catheter on the right. Heart size is within normal limits. The thoracic aorta is atheromatous.       Impression: 1. Chronic interstitial lung disease. 2. Superimposed interstitial infiltrate in the mid and lower lung zones, likely pulmonary edema. 3. Small right pleural effusion.    This report was signed and finalized on 9/19/2024 7:23 PM by Dr. Ramos Lagos MD.         Assessment:       * No active hospital problems. *    Past Medical History:   Diagnosis Date    Arthritis     Atrial fibrillation with rapid ventricular response 05/31/2019    Blindness of left eye     BPH with obstruction/lower urinary tract symptoms     Cancer     stomach & lung    CHF (congestive heart failure)     CKD (chronic kidney disease) stage 3, GFR 30-59 ml/min     COPD with acute exacerbation 08/03/2024    Coronary artery disease     Elevated cholesterol     Essential hypertension 10/02/2017    Fibrotic lung diseases     GERD (gastroesophageal reflux disease)     Hearing loss     History of transfusion     Hydronephrosis of left kidney     Hyperlipidemia     Hypertension     pt was taken off of all of his medications for BP (atenolol, lisinopril, lasix) because his BP kept bottoming out so his primary dr told him to discontinue them 1-2 months ago (Jan/Feb 2019). pt states he takes no medications currently.    Lung cancer     Mass of duodenum versus  letty hepatis  04/27/2019    Mass of left renal hilum  04/27/2019    Mass of upper lobe of right lung 02/2019    mass is shrinking on its own, so pt states Dr. Patel is just going to keep an eye on it and not do surgery right now.    Mediastinal adenopathy 10/24/2018    [facility]    Monoclonal gammopathy of unknown significance (MGUS) 09/11/2018    Pancreatic mass     pt states he had this in 2013 but it went away on its own. Now recent CT shows it has come back so he is going to have an ultrasound on 3/13/19.    Shortness of breath        Plan:     Acute on chronic hypoxic respiratory failure  Stage 4 metastatic non small cell lung cancer  COPD  Interstitial lung disease  Chronic diastolic CHF  CKD3  Hyperkalemia  Multifactorial anemia  Anxiety  Hemorrhoids  Constipation  Continue current treatment. Monitor counts. Increase activity. Labs Thursday. Continue oxygen and steroid weaning as tolerated under direction of pulmonology. Maintain patient safety.       Electronically signed by MARITZA Greenwood on 10/8/2024 at 11:13 CDT     Copy sent to Dr. Alexis, Irving Hall MD  I have discussed the care of Karoline Prater, including pertinent history and exam findings, with the nurse practitioner.    I have seen and examined the patient and the key elements of all parts of the encounter have been performed by me.  I agree with the assessment, plan and orders as documented by MARITZA Frias, after I modified the exam findings and the plan of treatments and the final version is my approved version of the assessment.        Electronically signed by Edmond Izquierdo MD on 10/9/2024 at 07:51 CDT

## 2024-10-08 NOTE — CONSULTS
Adult Nutrition  Assessment/PES    Patient Name:  Karoline Prater  YOB: 1940  MRN: [id number]  Admit Date:  10/7/2024    Assessment Date:  10/8/2024     Reason for Assessment       Row Name 10/08/24 1610          Reason for Assessment    Reason For Assessment nurse/nurse practitioner consult     Diagnosis pulmonary disease;cardiac disease;cancer diagnosis/related complications;renal disease               Nutrition/Diet History       Row Name 10/08/24 1611          Nutrition/Diet History    Typical Intake (Food/Fluid/EN/PN) Nutrition assessment completed per consult. Pt transferred from [facility] to [facility]. Admitted for acute and chronic hypoxic respiration. Pt taken out of [facility] isolation on 09/27/2024. Pt is independent and able to self-feed. Pt is a selective eater and dislikes modified diets. Pt denied any nausea, vomiting, or difficulty chewing or swallowing. Reported UBW 163lb. Pt reports good appetite and will receive chocolate milk for breakfast for additional nutrition. ONS kindly refused. Current wt 157 (bed scale). Will monitor and make recommendations as appropriate.     Food Preferences Dislikes carrots, broccolie, and green beans     Meal/Snack Patterns cottage cheese, chicken salad sandwhich,  salad     Vitamin/Mineral Supplements Multivitamin     Factors Affecting Nutritional Intake respiratory difficulty/therapies               Labs/Tests/Procedures/Meds       Row Name 10/08/24 1619          Labs/Procedures/Meds    Lab Results Comments K 3.4, Glu 146, BUN 44, Creat 1.57, Alb 2.8        Diagnostic Tests/Procedures    Diagnostic Test/Procedure Reviewed reviewed        Medications    Pertinent Medications Reviewed reviewed               Physical Findings       Row Name 10/08/24 1622          Physical Findings    Overall Physical Appearance blind on LEFT eye, [place], nasal cannula, fragile, BM 10/05               Estimated/Assessed Needs - Anthropometrics       Row Name 10/08/24  1623          Anthropometrics    Weight for Calculation 71.3 kg (157 lb 3 oz)        Estimated/Assessed Needs    Additional Documentation Fluid Requirements (Group);KCAL/KG (Group);Protein Requirements (Group)        KCAL/KG    KCAL/KG 25 Kcal/Kg (kcal);30 Kcal/Kg (kcal)     25 Kcal/Kg (kcal) 1782.5     30 Kcal/Kg (kcal) 2139        Protein Requirements    Weight Used For Protein Calculations 71.3 kg (157 lb 3 oz)     Est Protein Requirement Amount (gms/kg) 0.8 gm protein     Estimated Protein Requirements (gms/day) 57.04        Fluid Requirements    Fluid Requirements (mL/day) 1782  Ml per kcal/day     RDA Method (mL) 1782               Nutrition Prescription Ordered       Row Name 10/08/24 1625          Nutrition Prescription PO    Current PO Diet Regular     Fluid Consistency Thin               Evaluation of Received Nutrient/Fluid Intake       Row Name 10/08/24 1625          Nutrient/Fluid Evaluation    Number of Days Evaluated --  insufficient data, admitted less <24hr        PO Evaluation    % PO Intake Pt reports eating all of his breakfast and lunch              Problem/Interventions:   Problem 1       Row Name 10/08/24 1626          Nutrition Diagnoses Problem 1    Problem 1 Increased Nutrient Needs     Macronutrient Kcal     Etiology (related to) Medical Diagnosis     Pulmonary/Critical Care A/C respiratory failure     Renal CKD     Signs/Symptoms (evidenced by) Unintended Weight Change;% UBW     Percent (%) UBW 96 %  Based on noted UBW of 163lb     Unintended Weight Change Loss     Number of Pounds Lost 6  Based on noted UBW 163lb     Weight loss time period 4 weeks               Intervention Goal       Row Name 10/08/24 1633          Intervention Goal    General Disease management/therapy;Meet nutritional needs for age/condition;Reduce/improve symptoms     PO Meet estimated needs     Weight No significant weight loss               Nutrition Intervention       Row Name 10/08/24 1633          Nutrition  Intervention    RD/Tech Action Follow Tx progress;Care plan reviewd;Interview for preference;Menu provided;Advise available snack;Supplement offered/refused;Recommend/ordered     Recommended/Ordered Snack  chocolate milk during breakfast               Education/Evaluation       Row Name 10/08/24 1634          Education    Education No discharge needs identified at this time        Monitor/Evaluation    Monitor Per protocol              Electronically signed by:  Jeanie Kolb  10/08/24 16:36 CDT

## 2024-10-08 NOTE — PROGRESS NOTES
[facility] Fall Risk Assessment Note    Name: Karoline Prater  MRN: [id number]  CSN: [id number]  Admit Date/Time: 10/7/2024  4:53 PM.    Currently ordered medications associated with an increased risk for fall include:    Scheduled Meds:  busPIRone, 5 mg, Oral, TID With Meals  cetirizine, 10 mg, Oral, Daily  empagliflozin, 10 mg, Oral, Daily  HYDROcodone-acetaminophen, 1 tablet, Oral, TID  isosorbide mononitrate, 30 mg, Oral, Daily Before Lunch  melatonin, 10 mg, Oral, Nightly  metoprolol succinate XL, 25 mg, Oral, Daily  senna-docusate sodium, 2 tablet, Oral, BID  tamsulosin, 0.4 mg, Oral, Nightly    PRN Meds:    bisacodyl    diphenhydrAMINE    nitroglycerin    ondansetron ODT **OR** ondansetron    polyethylene glycol    traZODone    alanism    Rajan Aaron PharmD  10/08/24 09:34 CDT

## 2024-10-08 NOTE — CONSULTS
PULMONARY CONSULT - [facility]    Karoline Prater   MR# [id number]  Acct# [id number]  10/8/2024   13:12 CDT    Referring Provider: Edmond Izquierdo MD    Chief Complaint: Acute on chronic respiratory failure.  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 male born on 1940.  Patient is well-known to the pulmonary service and was seen and followed by me for the past few months.    He was first seen by me in the pulmonary inpatient consult in August 2024.He had a right upper lobe lung mass and also getting treatment under Dr. Michele for the last few years and currently getting radiation treatment with Dr. Danny Cool.  He has multiple medical problems as described above.  He is also known to have chronic kidney disease with elevated BUN and creatinine.  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  The patient had been treated with immunotherapy and was also treated with Keytruda.  He received and completed cycles of radiation treatment with radiation oncology and completed in February.  He is not on any active chemotherapy at this time and there is a port in place.  He gets regular follow-up with oncology and radiation oncology.  He was seen and followed by Dr. Michele in [facility] for his lung cancer.     He had hospitalization at [facility] in September 2024 with COVID infection and a month ago he was admitted in August with acute on chronic respiratory failure patient and also had MDRO UTI.  He reported he is a former smoker and quit smoking many years ago.  He used to chew tobacco as well.  He was seen by Dr. Becerra during  hospital admissions in 2019 but no further follow-up is noted and patient was using only albuterol inhaler at home and sometimes used nebulizer.  The patient's current medications included DuoNeb and was also getting Singulair but was not on any other medications.  He was not on any medications for pulmonary fibrosis and the CT scan did not show the typical UIP pattern.     The patient also has known pancreatic mass and MGUS.  He has hypertension and hyperlipidemia and anemia requiring blood transfusion.  During his hospitalizations with COVID infection last month he completed a course of steroids for 10 days and also received antibiotics for secondary pneumonia.  He remained in the medical floor for a few weeks with increased oxygen requirement and currently requiring 6 to 8 L of oxygen at rest and 10 L on exercise and activity.  He also developed some epistaxis during the stay in the floor and was seen by ENT and nasal packing was done currently the epistaxis has not recurred.  Transferred to the LT and he has bleeding from the left nose and epinephrine soaked cottonball was packed in the left nose to stop bleeding.  ENT is not following him here after the transfer to the LTAC hospital.  Patient was otherwise comfortable and at her baseline.  He needed Vapotherm initially but currently requiring high flow oxygen he is getting slowly titrated down.    Due to his poor general health and multiple medical comorbidities he is unable to go home on his own and he was living independently before and it was decided patient will need rehab and oxygen titration more before his discharge and he was transferred to the LTAC.      Past Medical History   has a past medical history of Arthritis, Atrial fibrillation with rapid ventricular response (05/31/2019), Blindness of left eye, BPH with obstruction/lower urinary tract symptoms, Cancer, CHF (congestive heart failure), CKD (chronic kidney disease) stage 3, GFR 30-59 ml/min,  COPD with acute exacerbation (08/03/2024), Coronary artery disease, Elevated cholesterol, Essential hypertension (10/02/2017), Fibrotic lung diseases, GERD (gastroesophageal reflux disease), Hearing loss, History of transfusion, Hydronephrosis of left kidney, Hyperlipidemia, Hypertension, Lung cancer, Mass of duodenum versus letty hepatis  (04/27/2019), Mass of left renal hilum  (04/27/2019), Mass of upper lobe of right lung (02/2019), Mediastinal adenopathy (10/24/2018), Monoclonal gammopathy of unknown significance (MGUS) (09/11/2018), Pancreatic mass, and Shortness of breath.   has a past surgical history that includes Cholecystectomy; Bronchoscopy (N/A, 10/24/2018); Esophagogastroduodenoscopy (N/A, 12/11/2018); Colonoscopy (N/A, 1/3/2019); Foot surgery (Right, 1966); Eye surgery (Left, 1964); cystoscopy, retrograde pyelogram and stent insertion (Left, 3/8/2019); Esophagogastroduodenoscopy (N/A, 4/29/2019); Abdominal surgery; Fracture surgery; Esophagogastroduodenoscopy (N/A, 5/9/2019); and Pacemaker Implantation (Left, 8/29/2024).  Allergies   Allergen Reactions    Penicillins Hives     Medications  acetaminophen, 1,000 mg, Oral, Nightly  aspirin, 81 mg, Oral, Daily  atorvastatin, 20 mg, Oral, Nightly  budesonide, 0.5 mg, Nebulization, BID - RT  busPIRone, 5 mg, Oral, TID With Meals  cetirizine, 10 mg, Oral, Daily  empagliflozin, 10 mg, Oral, Daily  EPINEPHrine (ADRENALIN) 1 mg compound, 10 mL, Topical, Once  guaiFENesin, 600 mg, Oral, Q12H  HYDROcodone-acetaminophen, 1 tablet, Oral, TID  Hydrocort-Pramoxine (Perianal), 1 Application, Rectal, Daily  ipratropium-albuterol, 3 mL, Nebulization, 4x Daily - RT  isosorbide mononitrate, 30 mg, Oral, Daily Before Lunch  melatonin, 10 mg, Oral, Nightly  metoprolol succinate XL, 25 mg, Oral, Daily  pantoprazole, 40 mg, Oral, Daily  predniSONE, 40 mg, Oral, Daily With Breakfast  senna-docusate sodium, 2 tablet, Oral, BID  sodium bicarbonate, 650 mg, Oral, TID  sodium  chloride, 2 spray, Each Nare, 5x Daily  tamsulosin, 0.4 mg, Oral, Nightly      sodium chloride, 2 spray      Social History   reports that he quit smoking about 20 years ago. His smoking use included cigarettes. He started smoking about 69 years ago. He quit smokeless tobacco use about 54 years ago.  His smokeless tobacco use included chew. He reports that he does not drink alcohol and does not use drugs.  Family History  family history includes Hypertension in his mother; Leukemia in his father.  Review of Systems:  He has positive for shortness of breath tiredness and fatigue and nosebleed.  All other systems reviewed were negative.  14 point system reviews were done  Physical Exam:  Vital signs: T: 97.6   BP: 155/75   P: 82   R: 23   sat: 93% on 6 L of oxygen to nasal cannula  Physical Exam  Vitals and nursing note reviewed.   Constitutional:       General: He is not in acute distress.     Appearance: He is ill-appearing. He is not toxic-appearing or diaphoretic.      Comments: Pleasant elderly  gentleman sitting up in the chair   HENT:      Head: Normocephalic and atraumatic.      Right Ear: Tympanic membrane and external ear normal. There is no impacted cerumen.      Left Ear: Tympanic membrane and external ear normal. There is no impacted cerumen.      Nose: Nose normal. Congestion present. No rhinorrhea.      Comments: Nosebleed noted mostly from the left nostril with clotted blood.     Mouth/Throat:      Mouth: Mucous membranes are moist.      Pharynx: Oropharynx is clear. No oropharyngeal exudate or posterior oropharyngeal erythema.   Eyes:      General: No scleral icterus.        Right eye: No discharge.         Left eye: No discharge.      Extraocular Movements: Extraocular movements intact.      Conjunctiva/sclera: Conjunctivae normal.      Pupils: Pupils are equal, round, and reactive to light.   Neck:      Vascular: No carotid bruit.   Cardiovascular:      Rate and Rhythm: Regular rhythm.  Tachycardia present.      Pulses: Normal pulses.      Heart sounds: Normal heart sounds. No murmur heard.     No friction rub. No gallop.   Pulmonary:      Effort: Pulmonary effort is normal. No respiratory distress.      Breath sounds: No stridor. Wheezing present. No rhonchi or rales.      Comments: Decreased bilateral breath sounds  Chest:      Chest wall: No tenderness.   Abdominal:      General: Abdomen is flat. Bowel sounds are normal. There is no distension.      Palpations: Abdomen is soft. There is no mass.      Tenderness: There is no abdominal tenderness. There is no guarding or rebound.      Hernia: No hernia is present.   Genitourinary:     Comments: Not examined.  Musculoskeletal:         General: No swelling, tenderness, deformity or signs of injury. Normal range of motion.      Cervical back: Normal range of motion and neck supple. No rigidity or tenderness.      Right lower leg: No edema.      Left lower leg: No edema.   Lymphadenopathy:      Cervical: No cervical adenopathy.   Skin:     General: Skin is warm.      Capillary Refill: Capillary refill takes less than 2 seconds.      Coloration: Skin is not jaundiced or pale.      Findings: No bruising, erythema, lesion or rash.   Neurological:      General: No focal deficit present.      Mental Status: He is alert and oriented to person, place, and time.      Cranial Nerves: No cranial nerve deficit.      Sensory: No sensory deficit.      Motor: Weakness present.      Deep Tendon Reflexes: Reflexes normal.      Comments: Hearing impairment noted   Psychiatric:         Mood and Affect: Mood normal.         Behavior: Behavior normal.         Thought Content: Thought content normal.         Judgment: Judgment normal.       Result Review  Results from last 7 days   Lab Units 10/08/24  0330 10/07/24  0403   WBC 10*3/mm3 8.28 8.27   HEMOGLOBIN g/dL 10.8* 10.2*   PLATELETS 10*3/mm3 240 249     Results from last 7 days   Lab Units 10/08/24  0330  10/07/24  0403   SODIUM mmol/L 138 138   POTASSIUM mmol/L 5.4* 5.6*   CO2 mmol/L 26.0 25.0   BUN mg/dL 44* 42*   CREATININE mg/dL 1.57* 1.77*   GLUCOSE mg/dL 146* 120*     Results from last 7 days   Lab Units 10/08/24  0436   PH, ARTERIAL pH units 7.390   PCO2, ARTERIAL mm Hg 47.1*   PO2 ART mm Hg 67.2*     Microbiology Results (last 10 days)       ** No results found for the last 240 hours. **          Lab Results   Component Value Date    PROBNP 1,967.0 (H) 10/01/2024     Recent radiology:   Imaging Results (Last 72 Hours)       Procedure Component Value Units Date/Time    XR Chest 1 View [[id number]] Collected: 10/08/24 0708     Updated: 10/08/24 0712    Narrative:      Frontal supine radiograph of the chest 10/8/2024 2:30 AM     History: s/p COVID and resp failure     Comparison: 9/30/2024     Findings:   Lines and tubes are stable in position. No new opacities or  pneumothoraces are visualized in the chest. The cardiomediastinal  silhouette and pulmonary vascularity are unchanged.       No acute osseous or soft tissue abnormality is noted.        Impression:      Impression:   1. No significant interval change since previous exam.           This report was signed and finalized on 10/8/2024 7:09 AM by Dr. Naveed Reynolds MD.             Personal review of imaging : CXR shows chronic obstructive pulmonary disease and chronic interstitial changes no acute changes noted.  Other test results (not lab or imaging): Results for orders placed during the hospital encounter of 06/29/24    Adult Stress Echo W/ Cont or Stress Agent if Necessary Per Protocol    Interpretation Summary    Overall, this is an intermediate risk test for ischemia.    No ECG evidence of myocardial ischemia. Negative clinical evidence of myocardial ischemia. Findings consistent with a normal ECG stress test.    Abnormal stress echo consistent with an intermediate risk study for myocardial ischemia.    Left ventricular systolic function is normal.  Left ventricular ejection fraction appears to be 61 - 65%.    The following left ventricular wall segments are hypokinetic: apical septal and apex hypokinetic.  Done.  Independent review of ekg: Done.    Problem List as identified by Epic (may contain historical, inactive problems)    Stage 3b chronic kidney disease    A-fib    Former smoker    COPD (chronic obstructive pulmonary disease)    History of radiation therapy    Chronic heart failure with preserved ejection fraction (HFpEF)    Atrial flutter    COVID-19 virus infection    Acute-on-chronic respiratory failure    Epistaxis    Chronic rhinitis    History of pneumonia    Pulmonary Assessment:  Acute on chronic hypoxic respiratory failure  Chronic obstructive pulmonary disease with acute exacerbation  COVID-19 infection  History of pneumonia  Severe COPD  Dependence on supplemental oxygen  Former smoker  Adenocarcinoma of the lung status postchemotherapy and radiation therapy  Possible patient fibrosis with pulmonary scarring  Atrial fibrillation and flutter was on anticoagulation currently off  Epistaxis  Chronic sinusitis  Hearing impairment  Chronic congestive diastolic heart failure with preserved ejection fraction    Recommend/plan:   Patient was admitted in the medical floor for last few weeks with COPD exacerbation and respiratory failure  He recently recovered from a COVID infection and was admitted in the hospital a month ago with COPD exacerbation and acute on chronic respiratory failure.  He also had an UTI.  He was noted to have severe COPD with respiratory failure and was on 3 L of oxygen at home but currently requiring 6 L of oxygen  During his stay in the medical floor he completed steroids and was getting supportive respiratory care but currently his oxygen requirement remains high and he is requiring 6 L oxygen at rest and 10 L on activity.  We will continue on the bronchodilator.  She is currently on Pulmicort and DuoNeb.  Continue titrating  oxygen to keep saturation more than 92%.  Needed Vapotherm in the past but currently only on high flow oxygen but still is requiring more than his baseline.  He has severe COPD and also has pulmonary fibrosis and scarring which is probably worsened by recent COVID infection  Continue monitoring him off anticoagulation.  He was on Eliquis but due to epistaxis it is on hold.  ENT had seen him on the floor but currently not following him.  Due to his recurrence of nosebleed advised him to use a epinephrine soaked gauze pack in the left nostril.   Flonase was discontinued.  Saline spray will be continued.  His right nostril apparently was clear and the left side appears to be the main source of bleeding   He will be getting the nasal packing with epinephrine in the left nostril and we will eventually remove it.  He was otherwise hemodynamically stable.  I will order repeat lab work including hemoglobin for tomorrow morning due to his recent bleeding episode  We will continue stress ulcer prophylaxis SCD.  He is on small dose of aspirin as well.  Nutritional support physical activity as tolerated.  Encourage incentive spirometry  CODE STATUS patient was limited code.  Overall process: Guarded but stable.  We will plan to follow him up in the pulmonary clinic after discharge  We appreciate the consult and we will continue following him.  Total time spent in seeing this patient in pulmonary consult in the LTAC is 45 minutes    Thank you for this consult.  We will follow along.      Electronically signed by     Tarik Crocker MD   Pulmonologist/Intensivist   on 10/8/2024 at 13:12 CDT

## 2024-10-09 LAB
ALBUMIN SERPL-MCNC: 2.7 G/DL (ref 3.5–5.2)
ALBUMIN/GLOB SERPL: 0.9 G/DL
ALP SERPL-CCNC: 99 U/L (ref 39–117)
ALT SERPL W P-5'-P-CCNC: 24 U/L (ref 1–41)
ANION GAP SERPL CALCULATED.3IONS-SCNC: 8 MMOL/L (ref 5–15)
AST SERPL-CCNC: 18 U/L (ref 1–40)
BILIRUB SERPL-MCNC: 0.2 MG/DL (ref 0–1.2)
BUN SERPL-MCNC: 43 MG/DL (ref 8–23)
BUN/CREAT SERPL: 24.6 (ref 7–25)
CALCIUM SPEC-SCNC: 10 MG/DL (ref 8.6–10.5)
CHLORIDE SERPL-SCNC: 104 MMOL/L (ref 98–107)
CO2 SERPL-SCNC: 26 MMOL/L (ref 22–29)
CREAT SERPL-MCNC: 1.75 MG/DL (ref 0.76–1.27)
DEPRECATED RDW RBC AUTO: 61.7 FL (ref 37–54)
EGFRCR SERPLBLD CKD-EPI 2021: 38.4 ML/MIN/1.73
ERYTHROCYTE [DISTWIDTH] IN BLOOD BY AUTOMATED COUNT: 17.7 % (ref 12.3–15.4)
GLOBULIN UR ELPH-MCNC: 3 GM/DL
GLUCOSE SERPL-MCNC: 131 MG/DL (ref 65–99)
HCT VFR BLD AUTO: 36.7 % (ref 37.5–51)
HGB BLD-MCNC: 10.7 G/DL (ref 13–17.7)
MCH RBC QN AUTO: 28 PG (ref 26.6–33)
MCHC RBC AUTO-ENTMCNC: 29.2 G/DL (ref 31.5–35.7)
MCV RBC AUTO: 96.1 FL (ref 79–97)
PLATELET # BLD AUTO: 256 10*3/MM3 (ref 140–450)
PMV BLD AUTO: 11.7 FL (ref 6–12)
POTASSIUM SERPL-SCNC: 5 MMOL/L (ref 3.5–5.2)
PROT SERPL-MCNC: 5.7 G/DL (ref 6–8.5)
RBC # BLD AUTO: 3.82 10*6/MM3 (ref 4.14–5.8)
SODIUM SERPL-SCNC: 138 MMOL/L (ref 136–145)
WBC NRBC COR # BLD AUTO: 8.15 10*3/MM3 (ref 3.4–10.8)

## 2024-10-09 PROCEDURE — 63710000001 DIPHENHYDRAMINE PER 50 MG: Performed by: INTERNAL MEDICINE

## 2024-10-09 PROCEDURE — 97530 THERAPEUTIC ACTIVITIES: CPT

## 2024-10-09 PROCEDURE — 80053 COMPREHEN METABOLIC PANEL: CPT | Performed by: INTERNAL MEDICINE

## 2024-10-09 PROCEDURE — 99233 SBSQ HOSP IP/OBS HIGH 50: CPT | Performed by: INTERNAL MEDICINE

## 2024-10-09 PROCEDURE — 97110 THERAPEUTIC EXERCISES: CPT

## 2024-10-09 PROCEDURE — 63710000001 PREDNISONE PER 1 MG: Performed by: INTERNAL MEDICINE

## 2024-10-09 PROCEDURE — 97535 SELF CARE MNGMENT TRAINING: CPT

## 2024-10-09 PROCEDURE — 85027 COMPLETE CBC AUTOMATED: CPT | Performed by: INTERNAL MEDICINE

## 2024-10-09 NOTE — PROGRESS NOTES
[facility] PULMONARY PROGRESS NOTE - [facility]    Patient: Karoline Prater  1940   MR# [id number]   Acct# [id number]  10/09/24   13:07 CDT  Referring Provider: Edmond Izquierdo MD    Chief Complaint: COVID, severe COPD, chronic respiratory failure oxygen dependent    Interval history: Patient was seen in the follow-up visit in pulmonary rounds in LTAC unit today he is currently requiring 6 L of oxygen.  He is sitting up in the chair.  He had a epinephrine packing done on the left nostril and the bleeding has stopped.  He is requiring 8 to 10 L on activity.  Overall comfortable and had no other acute events overnight.  He is afebrile    Meds:  acetaminophen, 1,000 mg, Oral, Nightly  aspirin, 81 mg, Oral, Daily  atorvastatin, 20 mg, Oral, Nightly  budesonide, 0.5 mg, Nebulization, BID - RT  busPIRone, 5 mg, Oral, TID With Meals  cetirizine, 10 mg, Oral, Daily  empagliflozin, 10 mg, Oral, Daily  guaiFENesin, 600 mg, Oral, Q12H  HYDROcodone-acetaminophen, 1 tablet, Oral, TID  Hydrocort-Pramoxine (Perianal), 1 Application, Rectal, Daily  ipratropium-albuterol, 3 mL, Nebulization, 4x Daily - RT  isosorbide mononitrate, 30 mg, Oral, Daily Before Lunch  melatonin, 10 mg, Oral, Nightly  metoprolol succinate XL, 25 mg, Oral, Daily  pantoprazole, 40 mg, Oral, Daily  predniSONE, 40 mg, Oral, Daily With Breakfast  senna-docusate sodium, 2 tablet, Oral, BID  sodium bicarbonate, 650 mg, Oral, TID  sodium chloride, 2 spray, Each Nare, 5x Daily  tamsulosin, 0.4 mg, Oral, Nightly      sodium chloride, 2 spray        Physical Exam:  There were no vitals filed for this visit.    Body mass index is 26.98 kg/m².    No intake or output data in the 24 hours ending 10/09/24 1307    Physical Exam  Vitals reviewed.   Constitutional:       Appearance: He is well-developed.      Interventions: Nasal cannula in place.   Cardiovascular:      Rate and Rhythm: Normal rate.   Pulmonary:      Effort: Pulmonary effort is  normal.      Breath sounds: Decreased breath sounds present.   Musculoskeletal:         General: Normal range of motion.      Cervical back: Normal range of motion and neck supple.   Neurological:      Mental Status: He is alert and oriented to person, place, and time.   Psychiatric:         Behavior: Behavior normal.         Thought Content: Thought content normal.         Judgment: Judgment normal.       Result Review  Laboratory Data:  Results from last 7 days   Lab Units 10/09/24  0451 10/08/24  0330 10/07/24  0403   WBC 10*3/mm3 8.15 8.28 8.27   HEMOGLOBIN g/dL 10.7* 10.8* 10.2*   PLATELETS 10*3/mm3 256 240 249     Results from last 7 days   Lab Units 10/09/24  0451 10/08/24  0330 10/07/24  0403   SODIUM mmol/L 138 138 138   POTASSIUM mmol/L 5.0 5.4* 5.6*   CO2 mmol/L 26.0 26.0 25.0   BUN mg/dL 43* 44* 42*   CREATININE mg/dL 1.75* 1.57* 1.77*     Results from last 7 days   Lab Units 10/08/24  0436   PH, ARTERIAL pH units 7.390   PCO2, ARTERIAL mm Hg 47.1*   PO2 ART mm Hg 67.2*     Microbiology Results (last 10 days)       ** No results found for the last 240 hours. **           Recent films:  XR Chest 1 View    Result Date: 10/8/2024  Frontal supine radiograph of the chest 10/8/2024 2:30 AM  History: s/p COVID and resp failure  Comparison: 9/30/2024  Findings: Lines and tubes are stable in position. No new opacities or pneumothoraces are visualized in the chest. The cardiomediastinal silhouette and pulmonary vascularity are unchanged.   No acute osseous or soft tissue abnormality is noted.      Impression: Impression: 1. No significant interval change since previous exam.    This report was signed and finalized on 10/8/2024 7:09 AM by Dr. Naveed Reynolds MD.      Personal review of imaging : CXR shows last chest x-ray shows stable findings with chronic changes no significant change noted from the prior x-ray done 4 days ago.      Pulmonary Assessment:  Acute on chronic hypoxic respiratory failure  Dependence  on supplemental oxygen  Bilateral pulm infiltrate/post-COVID syndrome  Secondary bacterial pneumonia  History of interstitial lung disease  Chronic congestive diastolic heart failure with preserved EF.  Chronic obstructive pulmonary disease.  No carcinoma the right upper lobe of the lung with metastasis  Status post chemotherapy and radiation treatment  Allergic rhinitis    Recommend/plan:   Patient was seen in the follow-up visit in pulmonary notes in the clinic today.  He was resting comfortably on 6 L.  No further epistaxis.  Using saline gel on the other nostril for nasal dryness.  Flonase has been stopped.  Continue humidifier.  Titrate oxygen as tolerated.  He needs 6 L at rest and 8 to 10 L on activity  COVID treatment completed.  Continue bronchodilator treatment and routine respiratory care.  Encouraged to continue incentive spirometry  DVT and stress ulcer prophylaxis pain and anxiety control.  Resume prophylactic anticoagulation if needed.  Currently only getting aspirin.  Continue current care plan nutritional support  Plan for outpatient pulmonary follow-up with me after discharge in a month after discharge  Labs and imaging studies from time to time.  Patient will most likely be discharged home today  CODE STATUS: Full.  Overall prognosis: Guarded.  We will follow when he is still in the hospital    Time spent by me: 35 min    Electronically signed by     Tarik Crocker MD,   Pulmonologist/Intensivist   10/9/2024, 13:07 CDT

## 2024-10-09 NOTE — PROGRESS NOTES
ROBSON Borjas APRN        Internal Medicine Progress Note    10/9/2024   10:19 CDT    Name:  Karoline Prater  MRN:    [id number]     Acct:     [id number]   Room:  [address] [place] [address] Day: 0     Admit Date: 10/7/2024  4:53 PM  PCP: Irving Alexis MD    Subjective:     C/C: weakness, shortness of breath    Interval History: Status:  improved. Resting in bed. No family at bedside. Afebrile. Maintaining adequate 02 sats with 02 at 5 lpm. Denies pain. Counts stable. Progressing with therapies.     Review of Systems   Constitutional: Positive for malaise/fatigue. Negative for chills, decreased appetite, weight gain and weight loss.   HENT:  Negative for congestion, ear discharge, hoarse voice and tinnitus.    Eyes:  Negative for blurred vision, discharge, visual disturbance and visual halos.   Cardiovascular:  Positive for dyspnea on exertion. Negative for chest pain, claudication, irregular heartbeat, leg swelling, orthopnea and paroxysmal nocturnal dyspnea.   Respiratory:  Positive for shortness of breath. Negative for cough, sputum production and wheezing.    Endocrine: Negative for cold intolerance, heat intolerance and polyuria.   Hematologic/Lymphatic: Negative for adenopathy. Does not bruise/bleed easily.   Skin:  Negative for dry skin, itching and suspicious lesions.   Musculoskeletal:  Negative for arthritis, back pain, falls, joint pain, muscle weakness and myalgias.   Gastrointestinal:  Negative for abdominal pain, constipation, diarrhea, dysphagia and hematemesis.   Genitourinary:  Negative for bladder incontinence, dysuria and frequency.   Neurological:  Positive for weakness. Negative for aphonia, disturbances in coordination and dizziness.   Psychiatric/Behavioral:  Negative for altered mental status, depression, memory loss and substance abuse. The patient does not have insomnia and is not nervous/anxious.          Medications:     Allergies:   Allergies    Allergen Reactions    Penicillins Hives       Current Meds:   Current Facility-Administered Medications:     acetaminophen (TYLENOL) tablet 1,000 mg, 1,000 mg, Oral, Nightly, Edmond Izquierdo MD    aspirin EC tablet 81 mg, 81 mg, Oral, Daily, Edmond Izquierdo MD    atorvastatin (LIPITOR) tablet 20 mg, 20 mg, Oral, Nightly, Edmond Izquierdo MD    bisacodyl (DULCOLAX) EC tablet 5 mg, 5 mg, Oral, Daily PRN, Edmond Izquierdo MD    bisacodyl (DULCOLAX) suppository 10 mg, 10 mg, Rectal, Daily PRN, Edmond Izquierdo MD    budesonide (PULMICORT) nebulizer solution 0.5 mg, 0.5 mg, Nebulization, BID - RT, Tarik Crocker MD    busPIRone (BUSPAR) tablet 5 mg, 5 mg, Oral, TID With Meals, Edmond Izquierdo MD    cetirizine (zyrTEC) tablet 10 mg, 10 mg, Oral, Daily, Edmond Izquierdo MD    diphenhydrAMINE (BENADRYL) capsule 25 mg, 25 mg, Oral, Nightly PRN, Edmond Izquierdo MD    empagliflozin (JARDIANCE) tablet 10 mg, 10 mg, Oral, Daily, Edmond Izquierdo MD    guaiFENesin (MUCINEX) 12 hr tablet 600 mg, 600 mg, Oral, Q12H, Edmond Izquierdo MD    HYDROcodone-acetaminophen (NORCO) 5-325 MG per tablet 1 tablet, 1 tablet, Oral, TID, Edmond Izquierdo MD    Hydrocort-Pramoxine (Perianal) (PROCTOFOAM-HS) 1-1 % rectal foam 1 Application, 1 Application, Rectal, Daily, Edmond Izquierdo MD    ipratropium-albuterol (DUO-NEB) nebulizer solution 3 mL, 3 mL, Nebulization, 4x Daily - RT, Edmond Izquierdo MD    isosorbide mononitrate (IMDUR) 24 hr tablet 30 mg, 30 mg, Oral, Daily Before Lunch, Edmond Izquierdo MD    melatonin tablet 10 mg, 10 mg, Oral, Nightly, Edmond Izquierdo MD    metoprolol succinate XL (TOPROL-XL) 24 hr tablet 25 mg, 25 mg, Oral, Daily, Edmond Izquierdo MD    nitroglycerin (NITROSTAT) SL tablet 0.4 mg, 0.4 mg, Sublingual, Q5 Min PRN, Edmond Izquierdo MD    ondansetron ODT (ZOFRAN-ODT) disintegrating tablet 4 mg, 4 mg, Oral,  "Q6H PRN **OR** ondansetron (ZOFRAN) injection 4 mg, 4 mg, Intravenous, Q6H PRN, Edmond Izquierdo MD    pantoprazole (PROTONIX) EC tablet 40 mg, 40 mg, Oral, Daily, Edmond Izquierdo MD    polyethylene glycol (MIRALAX) packet 17 g, 17 g, Oral, Daily PRN, Edmond Izquierdo MD    predniSONE (DELTASONE) tablet 40 mg, 40 mg, Oral, Daily With Breakfast, Edmond Izquierdo MD    sennosides-docusate (PERICOLACE) 8.6-50 MG per tablet 2 tablet, 2 tablet, Oral, BID, Edmond Izquierdo MD    sodium bicarbonate tablet 650 mg, 650 mg, Oral, TID, Edmond Izquierdo MD    sodium chloride 0.9 % bolus 250 mL, 250 mL, Intravenous, PRN, Edmond Izquierdo MD    sodium chloride nasal spray 2 spray, 2 spray, Each Nare, 5x Daily, Edmond Izquierdo MD    sodium chloride nasal spray 2 spray, 2 spray, Each Nare, Continuous PRN, Edmond Izquierdo MD    tamsulosin (FLOMAX) 24 hr capsule 0.4 mg, 0.4 mg, Oral, Nightly, Edmond Izquierdo MD    traZODone (DESYREL) tablet 25 mg, 25 mg, Oral, Nightly PRN, Edmond Izquierdo MD    zolpidem (AMBIEN) tablet 2.5 mg, 2.5 mg, Oral, Nightly PRN, Edmond Izquierdo MD    Data:     Code Status:    There are no questions and answers to display.       Family History   Problem Relation Age of Onset    Hypertension Mother     Leukemia Father        Social History     Socioeconomic History    Marital status: Single   Tobacco Use    Smoking status: Former     Current packs/day: 0.00     Types: Cigarettes     Start date: 10/29/1954     Quit date: 10/29/2003     Years since quittin.9    Smokeless tobacco: Former     Types: Chew     Quit date:    Vaping Use    Vaping status: Never Used   Substance and Sexual Activity    Alcohol use: No    Drug use: No    Sexual activity: Defer       Vitals:  Ht 162.6 cm (64\")   Wt 71.3 kg (157 lb 3 oz)   BMI 26.98 kg/m²   T 98.0 P 65 R 17 /78 Sp02 94% (5 lpm)          I/O (24Hr):  No intake or output data in the " 24 hours ending 10/09/24 1019    Labs and imaging:      Recent Results (from the past 12 hour(s))   CBC (No Diff)    Collection Time: 10/09/24  4:51 AM    Specimen: Blood   Result Value Ref Range    WBC 8.15 3.40 - 10.80 10*3/mm3    RBC 3.82 (L) 4.14 - 5.80 10*6/mm3    Hemoglobin 10.7 (L) 13.0 - 17.7 g/dL    Hematocrit 36.7 (L) 37.5 - 51.0 %    MCV 96.1 79.0 - 97.0 fL    MCH 28.0 26.6 - 33.0 pg    MCHC 29.2 (L) 31.5 - 35.7 g/dL    RDW 17.7 (H) 12.3 - 15.4 %    RDW-SD 61.7 (H) 37.0 - 54.0 fl    MPV 11.7 6.0 - 12.0 fL    Platelets 256 140 - 450 10*3/mm3   Comprehensive Metabolic Panel    Collection Time: 10/09/24  4:51 AM    Specimen: Blood   Result Value Ref Range    Glucose 131 (H) 65 - 99 mg/dL    BUN 43 (H) 8 - 23 mg/dL    Creatinine 1.75 (H) 0.76 - 1.27 mg/dL    Sodium 138 136 - 145 mmol/L    Potassium 5.0 3.5 - 5.2 mmol/L    Chloride 104 98 - 107 mmol/L    CO2 26.0 22.0 - 29.0 mmol/L    Calcium 10.0 8.6 - 10.5 mg/dL    Total Protein 5.7 (L) 6.0 - 8.5 g/dL    Albumin 2.7 (L) 3.5 - 5.2 g/dL    ALT (SGPT) 24 1 - 41 U/L    AST (SGOT) 18 1 - 40 U/L    Alkaline Phosphatase 99 39 - 117 U/L    Total Bilirubin 0.2 0.0 - 1.2 mg/dL    Globulin 3.0 gm/dL    A/G Ratio 0.9 g/dL    BUN/Creatinine Ratio 24.6 7.0 - 25.0    Anion Gap 8.0 5.0 - 15.0 mmol/L    eGFR 38.4 (L) >60.0 mL/min/1.73                               Physical Examination:        Physical Exam  Vitals and nursing note reviewed.   Constitutional:       Appearance: He is well-developed.   HENT:      Head: Normocephalic and atraumatic.      Nose: Nose normal.      Mouth/Throat:      Mouth: Mucous membranes are moist.      Pharynx: Oropharynx is clear.   Eyes:      Pupils: Pupils are equal, round, and reactive to light.      Comments: Left eye enucleated   Cardiovascular:      Rate and Rhythm: Normal rate and regular rhythm.      Heart sounds: Normal heart sounds.   Pulmonary:      Effort: Pulmonary effort is normal.      Breath sounds: Normal breath sounds.    Abdominal:      General: Bowel sounds are normal.      Palpations: Abdomen is soft.   Musculoskeletal:         General: Normal range of motion.      Cervical back: Normal range of motion and neck supple.      Comments: Generalized weakness   Skin:     General: Skin is warm and dry.   Neurological:      Mental Status: He is alert and oriented to person, place, and time.      Deep Tendon Reflexes: Reflexes are normal and symmetric.   Psychiatric:         Behavior: Behavior normal.           Assessment:             Stage 3b chronic kidney disease    A-fib    Former smoker    COPD (chronic obstructive pulmonary disease)    History of radiation therapy    Chronic heart failure with preserved ejection fraction (HFpEF)    Atrial flutter    COVID-19 virus infection    Acute-on-chronic respiratory failure    Epistaxis    Chronic rhinitis    History of pneumonia    Supplemental oxygen dependent    Past Medical History:   Diagnosis Date    Arthritis     Atrial fibrillation with rapid ventricular response 05/31/2019    Blindness of left eye     BPH with obstruction/lower urinary tract symptoms     Cancer     stomach & lung    CHF (congestive heart failure)     CKD (chronic kidney disease) stage 3, GFR 30-59 ml/min     COPD with acute exacerbation 08/03/2024    Coronary artery disease     Elevated cholesterol     Essential hypertension 10/02/2017    Fibrotic lung diseases     GERD (gastroesophageal reflux disease)     Hearing loss     History of transfusion     Hydronephrosis of left kidney     Hyperlipidemia     Hypertension     pt was taken off of all of his medications for BP (atenolol, lisinopril, lasix) because his BP kept bottoming out so his primary dr told him to discontinue them 1-2 months ago (Jan/Feb 2019). pt states he takes no medications currently.    Lung cancer     Mass of duodenum versus letty hepatis  04/27/2019    Mass of left renal hilum  04/27/2019    Mass of upper lobe of right lung 02/2019    mass is  shrinking on its own, so pt states Dr. Patel is just going to keep an eye on it and not do surgery right now.    Mediastinal adenopathy 10/24/2018    [facility]    Monoclonal gammopathy of unknown significance (MGUS) 09/11/2018    Pancreatic mass     pt states he had this in 2013 but it went away on its own. Now recent CT shows it has come back so he is going to have an ultrasound on 3/13/19.    Shortness of breath         Plan:        Acute on chronic hypoxic respiratory failure  Stage 4 metastatic non small cell lung cancer  COPD  Interstitial lung disease  Chronic diastolic CHF  CKD3  Hyperkalemia  Multifactorial anemia  Anxiety  Hemorrhoids  Constipation  Continue current treatment. Monitor counts. Increase activity. Labs in am. Continue oxygen weaning as tolerated under direction of pulmonology. Aggressive therapies as tolerated. Maintain patient safety.       Electronically signed by MARITZA Greenwood on 10/9/2024 at 10:19 CDT

## 2024-10-10 LAB
ANION GAP SERPL CALCULATED.3IONS-SCNC: 8 MMOL/L (ref 5–15)
BUN SERPL-MCNC: 49 MG/DL (ref 8–23)
BUN/CREAT SERPL: 28.8 (ref 7–25)
CALCIUM SPEC-SCNC: 10.5 MG/DL (ref 8.6–10.5)
CHLORIDE SERPL-SCNC: 101 MMOL/L (ref 98–107)
CO2 SERPL-SCNC: 28 MMOL/L (ref 22–29)
CREAT SERPL-MCNC: 1.7 MG/DL (ref 0.76–1.27)
DEPRECATED RDW RBC AUTO: 63.2 FL (ref 37–54)
EGFRCR SERPLBLD CKD-EPI 2021: 39.8 ML/MIN/1.73
ERYTHROCYTE [DISTWIDTH] IN BLOOD BY AUTOMATED COUNT: 17.9 % (ref 12.3–15.4)
GLUCOSE SERPL-MCNC: 132 MG/DL (ref 65–99)
HCT VFR BLD AUTO: 39 % (ref 37.5–51)
HGB BLD-MCNC: 11 G/DL (ref 13–17.7)
MCH RBC QN AUTO: 27.3 PG (ref 26.6–33)
MCHC RBC AUTO-ENTMCNC: 28.2 G/DL (ref 31.5–35.7)
MCV RBC AUTO: 96.8 FL (ref 79–97)
NT-PROBNP SERPL-MCNC: 4015 PG/ML (ref 0–1800)
PLATELET # BLD AUTO: 244 10*3/MM3 (ref 140–450)
PMV BLD AUTO: 11.4 FL (ref 6–12)
POTASSIUM SERPL-SCNC: 4.9 MMOL/L (ref 3.5–5.2)
RBC # BLD AUTO: 4.03 10*6/MM3 (ref 4.14–5.8)
SODIUM SERPL-SCNC: 137 MMOL/L (ref 136–145)
WBC NRBC COR # BLD AUTO: 9.2 10*3/MM3 (ref 3.4–10.8)

## 2024-10-10 PROCEDURE — 63710000001 PREDNISONE PER 1 MG: Performed by: INTERNAL MEDICINE

## 2024-10-10 PROCEDURE — 97530 THERAPEUTIC ACTIVITIES: CPT

## 2024-10-10 PROCEDURE — 85027 COMPLETE CBC AUTOMATED: CPT | Performed by: INTERNAL MEDICINE

## 2024-10-10 PROCEDURE — 80048 BASIC METABOLIC PNL TOTAL CA: CPT | Performed by: INTERNAL MEDICINE

## 2024-10-10 PROCEDURE — 83880 ASSAY OF NATRIURETIC PEPTIDE: CPT | Performed by: INTERNAL MEDICINE

## 2024-10-10 PROCEDURE — 97110 THERAPEUTIC EXERCISES: CPT

## 2024-10-10 PROCEDURE — 99233 SBSQ HOSP IP/OBS HIGH 50: CPT | Performed by: INTERNAL MEDICINE

## 2024-10-10 NOTE — PROGRESS NOTES
[facility] [place] [place]GR[place] - [facility]    Patient: Karoline Prater  1940   MR# [id number]   Acct# [id number]  10/10/24   15:45 CDT  Referring Provider: Edmond Izquierdo MD    Chief Complaint: COVID, severe COPD, chronic respiratory failure oxygen dependent    Interval history: Patient was seen in the follow-up visit in pulmonary rounds in LTAC unit today he is currently requiring 4 L of oxygen.  He is sitting up in the chair.  He had a epinephrine packing done on the left nostril and the bleeding has stopped.  He is requiring 6 to 8 L on activity.  Overall comfortable and had no other acute events overnight.  He is afebrile.    Meds:  acetaminophen, 1,000 mg, Oral, Nightly  aspirin, 81 mg, Oral, Daily  atorvastatin, 20 mg, Oral, Nightly  budesonide, 0.5 mg, Nebulization, BID - RT  busPIRone, 5 mg, Oral, TID With Meals  cetirizine, 10 mg, Oral, Daily  empagliflozin, 10 mg, Oral, Daily  guaiFENesin, 600 mg, Oral, Q12H  HYDROcodone-acetaminophen, 1 tablet, Oral, TID  Hydrocort-Pramoxine (Perianal), 1 Application, Rectal, Daily  ipratropium-albuterol, 3 mL, Nebulization, 4x Daily - RT  isosorbide mononitrate, 30 mg, Oral, Daily Before Lunch  melatonin, 10 mg, Oral, Nightly  metoprolol succinate XL, 25 mg, Oral, Daily  pantoprazole, 40 mg, Oral, Daily  predniSONE, 40 mg, Oral, Daily With Breakfast  senna-docusate sodium, 2 tablet, Oral, BID  sodium bicarbonate, 650 mg, Oral, TID  sodium chloride, 2 spray, Each Nare, 5x Daily  tamsulosin, 0.4 mg, Oral, Nightly      sodium chloride, 2 spray        Physical Exam:  There were no vitals filed for this visit.    Body mass index is 26.98 kg/m².    No intake or output data in the 24 hours ending 10/10/24 1545    Physical Exam  Vitals reviewed.   Constitutional:       Appearance: He is well-developed.      Interventions: Nasal cannula in place.   Cardiovascular:      Rate and Rhythm: Normal rate.   Pulmonary:      Effort: Pulmonary effort is  normal.      Breath sounds: Decreased breath sounds present.   Musculoskeletal:         General: Normal range of motion.      Cervical back: Normal range of motion and neck supple.   Neurological:      Mental Status: He is alert and oriented to person, place, and time.   Psychiatric:         Behavior: Behavior normal.         Thought Content: Thought content normal.         Judgment: Judgment normal.       Result Review  Laboratory Data:  Results from last 7 days   Lab Units 10/10/24  0510 10/09/24  0451 10/08/24  0330   WBC 10*3/mm3 9.20 8.15 8.28   HEMOGLOBIN g/dL 11.0* 10.7* 10.8*   PLATELETS 10*3/mm3 244 256 240     Results from last 7 days   Lab Units 10/10/24  0509 10/09/24  0451 10/08/24  0330   SODIUM mmol/L 137 138 138   POTASSIUM mmol/L 4.9 5.0 5.4*   CO2 mmol/L 28.0 26.0 26.0   BUN mg/dL 49* 43* 44*   CREATININE mg/dL 1.70* 1.75* 1.57*     Results from last 7 days   Lab Units 10/08/24  0436   PH, ARTERIAL pH units 7.390   PCO2, ARTERIAL mm Hg 47.1*   PO2 ART mm Hg 67.2*     Microbiology Results (last 10 days)       ** No results found for the last 240 hours. **           Recent films:  No radiology results from the last 24 hrs   Personal review of imaging : CXR shows last chest x-ray shows stable findings with chronic changes no significant change noted from the prior x-ray done 4 days ago.      Pulmonary Assessment:  Acute on chronic hypoxic respiratory failure  Dependence on supplemental oxygen  Bilateral pulm infiltrate/post-COVID syndrome  Secondary bacterial pneumonia  History of interstitial lung disease  Chronic congestive diastolic heart failure with preserved EF.  Chronic obstructive pulmonary disease.  No carcinoma the right upper lobe of the lung with metastasis  Status post chemotherapy and radiation treatment  Allergic rhinitis    Recommend/plan:   Patient was seen in the follow-up visit in pulmonary notes in the clinic today.  He was resting comfortably on 4 L.  No further epistaxis.  Nasal  packing will be removed  Continue using saline gel for nasal dryness.  Flonase has been stopped.  Continue humidifier with oxygen.  Titrate oxygen as tolerated.    Oxygen is titrated down to 4 L at rest and 6 to 8 L on activity.  COVID treatment completed.  Continue bronchodilator treatment and routine respiratory care.  Encouraged to continue incentive spirometry  DVT and stress ulcer prophylaxis pain and anxiety control.  Resume prophylactic anticoagulation if needed.  Currently only getting aspirin.  Continue current care plan nutritional support  Plan for outpatient pulmonary follow-up with me after discharge in a month after discharge  Labs and imaging studies from time to time.  Patient will most likely be discharged home today  CODE STATUS: Full.  Overall prognosis: Guarded.  We will follow when he is still in the hospital    Time spent by me: 35 min    Electronically signed by     Tarik Crocker MD,   Pulmonologist/Intensivist   10/10/2024, 15:45 CDT

## 2024-10-10 NOTE — PROGRESS NOTES
ROBSON Borjas APRN        Internal Medicine Progress Note    10/10/2024   11:10 CDT    Name:  Karoline Prater  MRN:    [id number]     Acct:     [id number]   Room:  [address] [place] [address] Day: 0     Admit Date: 10/7/2024  4:53 PM  PCP: Irving Alexis MD    Subjective:     C/C: weakness, shortness of breath    Interval History: Status:  improved. Up to chair. No family at bedside. Afebrile. Maintaining adequate 02 sats with 02 at 4 lpm at rest. Still desats and has increased oxygen demand with exertion.  Denies pain. Counts stable. Progressing with therapies.     Review of Systems   Constitutional: Positive for malaise/fatigue. Negative for chills, decreased appetite, weight gain and weight loss.   HENT:  Negative for congestion, ear discharge, hoarse voice and tinnitus.    Eyes:  Negative for blurred vision, discharge, visual disturbance and visual halos.   Cardiovascular:  Positive for dyspnea on exertion. Negative for chest pain, claudication, irregular heartbeat, leg swelling, orthopnea and paroxysmal nocturnal dyspnea.   Respiratory:  Positive for shortness of breath. Negative for cough, sputum production and wheezing.    Endocrine: Negative for cold intolerance, heat intolerance and polyuria.   Hematologic/Lymphatic: Negative for adenopathy. Does not bruise/bleed easily.   Skin:  Negative for dry skin, itching and suspicious lesions.   Musculoskeletal:  Negative for arthritis, back pain, falls, joint pain, muscle weakness and myalgias.   Gastrointestinal:  Negative for abdominal pain, constipation, diarrhea, dysphagia and hematemesis.   Genitourinary:  Negative for bladder incontinence, dysuria and frequency.   Neurological:  Positive for weakness. Negative for aphonia, disturbances in coordination and dizziness.   Psychiatric/Behavioral:  Negative for altered mental status, depression, memory loss and substance abuse. The patient does not have insomnia and is not  nervous/anxious.          Medications:     Allergies:   Allergies   Allergen Reactions    Penicillins Hives       Current Meds:   Current Facility-Administered Medications:     acetaminophen (TYLENOL) tablet 1,000 mg, 1,000 mg, Oral, Nightly, Edmond Izquierdo MD    aspirin EC tablet 81 mg, 81 mg, Oral, Daily, Edmond Izquierdo MD    atorvastatin (LIPITOR) tablet 20 mg, 20 mg, Oral, Nightly, Edmond Izquierdo MD    bisacodyl (DULCOLAX) EC tablet 5 mg, 5 mg, Oral, Daily PRN, Edmond Izquierdo MD    bisacodyl (DULCOLAX) suppository 10 mg, 10 mg, Rectal, Daily PRN, Edmond Izquierdo MD    budesonide (PULMICORT) nebulizer solution 0.5 mg, 0.5 mg, Nebulization, BID - RT, Tarik Crocker MD    busPIRone (BUSPAR) tablet 5 mg, 5 mg, Oral, TID With Meals, Edmond Izquierdo MD    cetirizine (zyrTEC) tablet 10 mg, 10 mg, Oral, Daily, Edmond Izquierdo MD    diphenhydrAMINE (BENADRYL) capsule 25 mg, 25 mg, Oral, Nightly PRN, Edmond Izquierdo MD    empagliflozin (JARDIANCE) tablet 10 mg, 10 mg, Oral, Daily, Edmond Izquierdo MD    guaiFENesin (MUCINEX) 12 hr tablet 600 mg, 600 mg, Oral, Q12H, Edmond Izquierdo MD    HYDROcodone-acetaminophen (NORCO) 5-325 MG per tablet 1 tablet, 1 tablet, Oral, TID, Edmond Izquierdo MD    Hydrocort-Pramoxine (Perianal) (PROCTOFOAM-HS) 1-1 % rectal foam 1 Application, 1 Application, Rectal, Daily, Edmond Izquierdo MD    ipratropium-albuterol (DUO-NEB) nebulizer solution 3 mL, 3 mL, Nebulization, 4x Daily - RT, Edmond Izquierdo MD    isosorbide mononitrate (IMDUR) 24 hr tablet 30 mg, 30 mg, Oral, Daily Before Lunch, Edmond Izquierdo MD    melatonin tablet 10 mg, 10 mg, Oral, Nightly, Edmond Izquierdo MD    metoprolol succinate XL (TOPROL-XL) 24 hr tablet 25 mg, 25 mg, Oral, Daily, Edmond Izquierdo MD    nitroglycerin (NITROSTAT) SL tablet 0.4 mg, 0.4 mg, Sublingual, Q5 Min PRN, Edmond Izquierdo MD     "ondansetron ODT (ZOFRAN-ODT) disintegrating tablet 4 mg, 4 mg, Oral, Q6H PRN **OR** ondansetron (ZOFRAN) injection 4 mg, 4 mg, Intravenous, Q6H PRN, Edmond Izquierdo MD    pantoprazole (PROTONIX) EC tablet 40 mg, 40 mg, Oral, Daily, Edmond Izquierdo MD    polyethylene glycol (MIRALAX) packet 17 g, 17 g, Oral, Daily PRN, Edmond Izquierdo MD    predniSONE (DELTASONE) tablet 40 mg, 40 mg, Oral, Daily With Breakfast, Edmond Izquierdo MD    sennosides-docusate (PERICOLACE) 8.6-50 MG per tablet 2 tablet, 2 tablet, Oral, BID, Edmond Izquierdo MD    sodium bicarbonate tablet 650 mg, 650 mg, Oral, TID, Edmond Izquierdo MD    sodium chloride 0.9 % bolus 250 mL, 250 mL, Intravenous, PRN, Edmond Izquierdo MD    sodium chloride nasal spray 2 spray, 2 spray, Each Nare, 5x Daily, Edmond Izquierdo MD    sodium chloride nasal spray 2 spray, 2 spray, Each Nare, Continuous PRN, Edmond Izquierdo MD    tamsulosin (FLOMAX) 24 hr capsule 0.4 mg, 0.4 mg, Oral, Nightly, Edmond Izquierdo MD    traZODone (DESYREL) tablet 25 mg, 25 mg, Oral, Nightly PRN, Edmond Izquierdo MD    zolpidem (AMBIEN) tablet 2.5 mg, 2.5 mg, Oral, Nightly PRN, Edmond Izquierdo MD    Data:     Code Status:    There are no questions and answers to display.       Family History   Problem Relation Age of Onset    Hypertension Mother     Leukemia Father        Social History     Socioeconomic History    Marital status: Single   Tobacco Use    Smoking status: Former     Current packs/day: 0.00     Types: Cigarettes     Start date: 10/29/1954     Quit date: 10/29/2003     Years since quittin.9    Smokeless tobacco: Former     Types: Chew     Quit date:    Vaping Use    Vaping status: Never Used   Substance and Sexual Activity    Alcohol use: No    Drug use: No    Sexual activity: Defer       Vitals:  Ht 162.6 cm (64\")   Wt 71.3 kg (157 lb 3 oz)   BMI 26.98 kg/m²   T 98.3 P 82 R 18 /86 " Sp02 94% (4 lpm)          I/O (24Hr):  No intake or output data in the 24 hours ending 10/10/24 1110    Labs and imaging:      Recent Results (from the past 12 hour(s))   Basic Metabolic Panel    Collection Time: 10/10/24  5:09 AM    Specimen: Blood   Result Value Ref Range    Glucose 132 (H) 65 - 99 mg/dL    BUN 49 (H) 8 - 23 mg/dL    Creatinine 1.70 (H) 0.76 - 1.27 mg/dL    Sodium 137 136 - 145 mmol/L    Potassium 4.9 3.5 - 5.2 mmol/L    Chloride 101 98 - 107 mmol/L    CO2 28.0 22.0 - 29.0 mmol/L    Calcium 10.5 8.6 - 10.5 mg/dL    BUN/Creatinine Ratio 28.8 (H) 7.0 - 25.0    Anion Gap 8.0 5.0 - 15.0 mmol/L    eGFR 39.8 (L) >60.0 mL/min/1.73   BNP    Collection Time: 10/10/24  5:09 AM    Specimen: Blood   Result Value Ref Range    proBNP 4,015.0 (H) 0.0 - 1,800.0 pg/mL   CBC (No Diff)    Collection Time: 10/10/24  5:10 AM    Specimen: Blood   Result Value Ref Range    WBC 9.20 3.40 - 10.80 10*3/mm3    RBC 4.03 (L) 4.14 - 5.80 10*6/mm3    Hemoglobin 11.0 (L) 13.0 - 17.7 g/dL    Hematocrit 39.0 37.5 - 51.0 %    MCV 96.8 79.0 - 97.0 fL    MCH 27.3 26.6 - 33.0 pg    MCHC 28.2 (L) 31.5 - 35.7 g/dL    RDW 17.9 (H) 12.3 - 15.4 %    RDW-SD 63.2 (H) 37.0 - 54.0 fl    MPV 11.4 6.0 - 12.0 fL    Platelets 244 140 - 450 10*3/mm3                               Physical Examination:        Physical Exam  Vitals and nursing note reviewed.   Constitutional:       Appearance: He is well-developed.   HENT:      Head: Normocephalic and atraumatic.      Nose: Nose normal.      Mouth/Throat:      Mouth: Mucous membranes are moist.      Pharynx: Oropharynx is clear.   Eyes:      Pupils: Pupils are equal, round, and reactive to light.      Comments: Left eye enucleated   Cardiovascular:      Rate and Rhythm: Normal rate and regular rhythm.      Heart sounds: Normal heart sounds.   Pulmonary:      Effort: Pulmonary effort is normal.      Breath sounds: Normal breath sounds.   Abdominal:      General: Bowel sounds are normal.       Palpations: Abdomen is soft.   Musculoskeletal:         General: Normal range of motion.      Cervical back: Normal range of motion and neck supple.      Comments: Generalized weakness   Skin:     General: Skin is warm and dry.   Neurological:      Mental Status: He is alert and oriented to person, place, and time.      Deep Tendon Reflexes: Reflexes are normal and symmetric.   Psychiatric:         Behavior: Behavior normal.           Assessment:             Stage 3b chronic kidney disease    A-fib    Former smoker    COPD (chronic obstructive pulmonary disease)    History of radiation therapy    Chronic heart failure with preserved ejection fraction (HFpEF)    Atrial flutter    COVID-19 virus infection    Acute-on-chronic respiratory failure    Epistaxis    Chronic rhinitis    History of pneumonia    Supplemental oxygen dependent    Past Medical History:   Diagnosis Date    Arthritis     Atrial fibrillation with rapid ventricular response 05/31/2019    Blindness of left eye     BPH with obstruction/lower urinary tract symptoms     Cancer     stomach & lung    CHF (congestive heart failure)     CKD (chronic kidney disease) stage 3, GFR 30-59 ml/min     COPD with acute exacerbation 08/03/2024    Coronary artery disease     Elevated cholesterol     Essential hypertension 10/02/2017    Fibrotic lung diseases     GERD (gastroesophageal reflux disease)     Hearing loss     History of transfusion     Hydronephrosis of left kidney     Hyperlipidemia     Hypertension     pt was taken off of all of his medications for BP (atenolol, lisinopril, lasix) because his BP kept bottoming out so his primary dr told him to discontinue them 1-2 months ago (Jan/Feb 2019). pt states he takes no medications currently.    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 Patel is just going  to keep an eye on it and not do surgery right now.    Mediastinal adenopathy 10/24/2018    [facility]    Monoclonal gammopathy of unknown significance (MGUS) 09/11/2018    Pancreatic mass     pt states he had this in 2013 but it went away on its own. Now recent CT shows it has come back so he is going to have an ultrasound on 3/13/19.    Shortness of breath         Plan:        Acute on chronic hypoxic respiratory failure  Stage 4 metastatic non small cell lung cancer  COPD  Interstitial lung disease  Chronic diastolic CHF  CKD3  Hyperkalemia  Multifactorial anemia  Anxiety  Hemorrhoids  Constipation  Continue current treatment. Monitor counts. Increase activity. Labs Monday. Continue oxygen weaning as tolerated under direction of pulmonology. Aggressive therapies as tolerated. Maintain patient safety.       Electronically signed by MARITZA Greenwood on 10/10/2024 at 11:10 CDT

## 2024-10-11 PROCEDURE — 97116 GAIT TRAINING THERAPY: CPT

## 2024-10-11 PROCEDURE — 97530 THERAPEUTIC ACTIVITIES: CPT

## 2024-10-11 PROCEDURE — 63710000001 PREDNISONE PER 1 MG: Performed by: INTERNAL MEDICINE

## 2024-10-11 PROCEDURE — 97535 SELF CARE MNGMENT TRAINING: CPT

## 2024-10-11 PROCEDURE — 99233 SBSQ HOSP IP/OBS HIGH 50: CPT | Performed by: INTERNAL MEDICINE

## 2024-10-11 NOTE — PROGRESS NOTES
[facility] – [place], [place] PULMONARY PROGRESS NOTE - [facility]    Patient: Karoline Prater  1940   MR# [id number]   Acct# [id number]  10/11/24   18:45 CDT  Referring Provider: Edmond Izquierdo MD    Chief Complaint: COVID, severe COPD, chronic respiratory failure oxygen dependent    Interval history: Patient was seen in the follow-up visit in pulmonary rounds in LTAC unit today he is currently requiring 4 L of oxygen.  He is sitting up in the chair..  His nasal packing has been removed and he stopped bleeding.  He is requiring 6 to 8 L on activity.  Overall comfortable and had no other acute events overnight.  He is afebrile.  He is working with the physical therapy    Meds:  acetaminophen, 1,000 mg, Oral, Nightly  aspirin, 81 mg, Oral, Daily  atorvastatin, 20 mg, Oral, Nightly  budesonide, 0.5 mg, Nebulization, BID - RT  busPIRone, 5 mg, Oral, TID With Meals  cetirizine, 10 mg, Oral, Daily  empagliflozin, 10 mg, Oral, Daily  guaiFENesin, 600 mg, Oral, Q12H  HYDROcodone-acetaminophen, 1 tablet, Oral, TID  Hydrocort-Pramoxine (Perianal), 1 Application, Rectal, Daily  ipratropium-albuterol, 3 mL, Nebulization, 4x Daily - RT  isosorbide mononitrate, 30 mg, Oral, Daily Before Lunch  melatonin, 10 mg, Oral, Nightly  metoprolol succinate XL, 25 mg, Oral, Daily  pantoprazole, 40 mg, Oral, Daily  predniSONE, 40 mg, Oral, Daily With Breakfast  senna-docusate sodium, 2 tablet, Oral, BID  sodium bicarbonate, 650 mg, Oral, TID  sodium chloride, 2 spray, Each Nare, 5x Daily  tamsulosin, 0.4 mg, Oral, Nightly      sodium chloride, 2 spray        Physical Exam:  There were no vitals filed for this visit.    Body mass index is 26.98 kg/m².    No intake or output data in the 24 hours ending 10/11/24 1845    Physical Exam  Vitals reviewed.   Constitutional:       Appearance: He is well-developed.      Interventions: Nasal cannula in place.   Cardiovascular:      Rate and Rhythm: Normal rate.   Pulmonary:      Effort:  Pulmonary effort is normal.      Breath sounds: Decreased breath sounds present.   Musculoskeletal:         General: Normal range of motion.      Cervical back: Normal range of motion and neck supple.   Neurological:      Mental Status: He is alert and oriented to person, place, and time.   Psychiatric:         Behavior: Behavior normal.         Thought Content: Thought content normal.         Judgment: Judgment normal.       Result Review  Laboratory Data:  Results from last 7 days   Lab Units 10/10/24  0510 10/09/24  0451 10/08/24  0330   WBC 10*3/mm3 9.20 8.15 8.28   HEMOGLOBIN g/dL 11.0* 10.7* 10.8*   PLATELETS 10*3/mm3 244 256 240     Results from last 7 days   Lab Units 10/10/24  0509 10/09/24  0451 10/08/24  0330   SODIUM mmol/L 137 138 138   POTASSIUM mmol/L 4.9 5.0 5.4*   CO2 mmol/L 28.0 26.0 26.0   BUN mg/dL 49* 43* 44*   CREATININE mg/dL 1.70* 1.75* 1.57*     Results from last 7 days   Lab Units 10/08/24  0436   PH, ARTERIAL pH units 7.390   PCO2, ARTERIAL mm Hg 47.1*   PO2 ART mm Hg 67.2*     Microbiology Results (last 10 days)       ** No results found for the last 240 hours. **           Recent films:  No radiology results from the last 24 hrs   Personal review of imaging : CXR shows last chest x-ray shows stable findings with chronic changes no significant change noted from the prior x-ray done 4 days ago.      Pulmonary Assessment:  Acute on chronic hypoxic respiratory failure  Dependence on supplemental oxygen  Bilateral pulm infiltrate/post-COVID syndrome  Secondary bacterial pneumonia  History of interstitial lung disease  Chronic congestive diastolic heart failure with preserved EF.  Chronic obstructive pulmonary disease.  No carcinoma the right upper lobe of the lung with metastasis  Status post chemotherapy and radiation treatment  Allergic rhinitis    Recommend/plan:   Patient was seen in the follow-up visit in pulmonary notes in the clinic today.  He was resting comfortably on 4 L.  He is  requiring 6 to 8 L oxygen on exertion.  Nasal packing has been removed and no further bleeding noted.  Continue using saline gel for nasal dryness.  Flonase has been stopped.  He is getting humidifier with oxygen.  Titrate oxygen as tolerated.  Keep saturation more than 92%.  COVID treatment completed.  Continue bronchodilator treatment and routine respiratory care.  Encouraged to continue incentive spirometry.  Physical activity as tolerated.  DVT and stress ulcer prophylaxis pain and anxiety control.  Resume prophylactic anticoagulation if needed.  Currently only getting aspirin.  Will need outpatient pulmonary follow-up with me after discharge in a month after discharge  Labs and imaging studies from time to time.   CODE STATUS: Full.  Overall prognosis: Guarded.  We will follow when he is still in the hospital    Time spent by me: 35 min    Electronically signed by     Tarik Crocker MD,   Pulmonologist/Intensivist   10/11/2024, 18:45 CDT

## 2024-10-11 NOTE — PROGRESS NOTES
ROBSON Borjas APRN        Internal Medicine Progress Note    10/11/2024   09:13 CDT    Name:  Karoline Prater  MRN:    [id number]     Acct:     [id number]   Room:  [address] [place] [address] Day: 0     Admit Date: 10/7/2024  4:53 PM  PCP: Irving Alexis MD    Subjective:     C/C: weakness, shortness of breath    Interval History: Status:  improved. Up to chair. No family at bedside. Afebrile. Maintaining adequate 02 sats with 02 at 8 lpm with activity. Still desats and has increased oxygen demand with exertion.  Denies pain.  Progressing with therapies.     Review of Systems   Constitutional: Positive for malaise/fatigue. Negative for chills, decreased appetite, weight gain and weight loss.   HENT:  Negative for congestion, ear discharge, hoarse voice and tinnitus.    Eyes:  Negative for blurred vision, discharge, visual disturbance and visual halos.   Cardiovascular:  Positive for dyspnea on exertion. Negative for chest pain, claudication, irregular heartbeat, leg swelling, orthopnea and paroxysmal nocturnal dyspnea.   Respiratory:  Positive for shortness of breath. Negative for cough, sputum production and wheezing.    Endocrine: Negative for cold intolerance, heat intolerance and polyuria.   Hematologic/Lymphatic: Negative for adenopathy. Does not bruise/bleed easily.   Skin:  Negative for dry skin, itching and suspicious lesions.   Musculoskeletal:  Negative for arthritis, back pain, falls, joint pain, muscle weakness and myalgias.   Gastrointestinal:  Negative for abdominal pain, constipation, diarrhea, dysphagia and hematemesis.   Genitourinary:  Negative for bladder incontinence, dysuria and frequency.   Neurological:  Positive for weakness. Negative for aphonia, disturbances in coordination and dizziness.   Psychiatric/Behavioral:  Negative for altered mental status, depression, memory loss and substance abuse. The patient does not have insomnia and is not  nervous/anxious.          Medications:     Allergies:   Allergies   Allergen Reactions    Penicillins Hives       Current Meds:   Current Facility-Administered Medications:     acetaminophen (TYLENOL) tablet 1,000 mg, 1,000 mg, Oral, Nightly, Edmond Izquierdo MD    aspirin EC tablet 81 mg, 81 mg, Oral, Daily, Edmond Izquierdo MD    atorvastatin (LIPITOR) tablet 20 mg, 20 mg, Oral, Nightly, Edmond Izquierdo MD    bisacodyl (DULCOLAX) EC tablet 5 mg, 5 mg, Oral, Daily PRN, Edmond Izquierdo MD    bisacodyl (DULCOLAX) suppository 10 mg, 10 mg, Rectal, Daily PRN, Edmond Izquierdo MD    budesonide (PULMICORT) nebulizer solution 0.5 mg, 0.5 mg, Nebulization, BID - RT, Tarik Crocker MD    busPIRone (BUSPAR) tablet 5 mg, 5 mg, Oral, TID With Meals, Edmond Izquierdo MD    cetirizine (zyrTEC) tablet 10 mg, 10 mg, Oral, Daily, Edmond Izquierdo MD    diphenhydrAMINE (BENADRYL) capsule 25 mg, 25 mg, Oral, Nightly PRN, Edmond Izquierdo MD    empagliflozin (JARDIANCE) tablet 10 mg, 10 mg, Oral, Daily, Edmond Izquierdo MD    guaiFENesin (MUCINEX) 12 hr tablet 600 mg, 600 mg, Oral, Q12H, Edmond Izquierdo MD    HYDROcodone-acetaminophen (NORCO) 5-325 MG per tablet 1 tablet, 1 tablet, Oral, TID, Edmond Izquierdo MD    Hydrocort-Pramoxine (Perianal) (PROCTOFOAM-HS) 1-1 % rectal foam 1 Application, 1 Application, Rectal, Daily, Edmond Izquierdo MD    ipratropium-albuterol (DUO-NEB) nebulizer solution 3 mL, 3 mL, Nebulization, 4x Daily - RT, Edmond Izquierdo MD    isosorbide mononitrate (IMDUR) 24 hr tablet 30 mg, 30 mg, Oral, Daily Before Lunch, Edmond Izquierdo MD    melatonin tablet 10 mg, 10 mg, Oral, Nightly, Edmond Izquierdo MD    metoprolol succinate XL (TOPROL-XL) 24 hr tablet 25 mg, 25 mg, Oral, Daily, Edmond Izquierdo MD    nitroglycerin (NITROSTAT) SL tablet 0.4 mg, 0.4 mg, Sublingual, Q5 Min PRN, Edmond Izquierdo MD     "ondansetron ODT (ZOFRAN-ODT) disintegrating tablet 4 mg, 4 mg, Oral, Q6H PRN **OR** ondansetron (ZOFRAN) injection 4 mg, 4 mg, Intravenous, Q6H PRN, Edmond Izquierdo MD    pantoprazole (PROTONIX) EC tablet 40 mg, 40 mg, Oral, Daily, Edmond Izquierdo MD    polyethylene glycol (MIRALAX) packet 17 g, 17 g, Oral, Daily PRN, Edmond Izquierdo MD    predniSONE (DELTASONE) tablet 40 mg, 40 mg, Oral, Daily With Breakfast, Edmond Izquierdo MD    sennosides-docusate (PERICOLACE) 8.6-50 MG per tablet 2 tablet, 2 tablet, Oral, BID, Edmond Izquierdo MD    sodium bicarbonate tablet 650 mg, 650 mg, Oral, TID, Edmond Izquierdo MD    sodium chloride 0.9 % bolus 250 mL, 250 mL, Intravenous, PRN, Edmond Izquierdo MD    sodium chloride nasal spray 2 spray, 2 spray, Each Nare, 5x Daily, Edmond Izquierdo MD    sodium chloride nasal spray 2 spray, 2 spray, Each Nare, Continuous PRN, Edmond Izquierdo MD    tamsulosin (FLOMAX) 24 hr capsule 0.4 mg, 0.4 mg, Oral, Nightly, Edmond Izquierdo MD    traZODone (DESYREL) tablet 25 mg, 25 mg, Oral, Nightly PRN, Edmond Izquierdo MD    zolpidem (AMBIEN) tablet 2.5 mg, 2.5 mg, Oral, Nightly PRN, Edmond Izquierdo MD    Data:     Code Status:    There are no questions and answers to display.       Family History   Problem Relation Age of Onset    Hypertension Mother     Leukemia Father        Social History     Socioeconomic History    Marital status: Single   Tobacco Use    Smoking status: Former     Current packs/day: 0.00     Types: Cigarettes     Start date: 10/29/1954     Quit date: 10/29/2003     Years since quittin.9    Smokeless tobacco: Former     Types: Chew     Quit date:    Vaping Use    Vaping status: Never Used   Substance and Sexual Activity    Alcohol use: No    Drug use: No    Sexual activity: Defer       Vitals:  Ht 162.6 cm (64\")   Wt 71.3 kg (157 lb 3 oz)   BMI 26.98 kg/m²   T 98.4 P 69 R 19 /74 " Sp02 100% (8 lpm)          I/O (24Hr):  No intake or output data in the 24 hours ending 10/11/24 0913    Labs and imaging:      No results found for this or any previous visit (from the past 12 hour(s)).                              Physical Examination:        Physical Exam  Vitals and nursing note reviewed.   Constitutional:       Appearance: He is well-developed.   HENT:      Head: Normocephalic and atraumatic.      Nose: Nose normal.      Mouth/Throat:      Mouth: Mucous membranes are moist.      Pharynx: Oropharynx is clear.   Eyes:      Pupils: Pupils are equal, round, and reactive to light.      Comments: Left eye enucleated   Cardiovascular:      Rate and Rhythm: Normal rate and regular rhythm.      Heart sounds: Normal heart sounds.   Pulmonary:      Effort: Pulmonary effort is normal.      Breath sounds: Normal breath sounds.   Abdominal:      General: Bowel sounds are normal.      Palpations: Abdomen is soft.   Musculoskeletal:         General: Normal range of motion.      Cervical back: Normal range of motion and neck supple.      Comments: Generalized weakness   Skin:     General: Skin is warm and dry.   Neurological:      Mental Status: He is alert and oriented to person, place, and time.      Deep Tendon Reflexes: Reflexes are normal and symmetric.   Psychiatric:         Behavior: Behavior normal.           Assessment:             Stage 3b chronic kidney disease    A-fib    Former smoker    COPD (chronic obstructive pulmonary disease)    History of radiation therapy    Chronic heart failure with preserved ejection fraction (HFpEF)    Atrial flutter    COVID-19 virus infection    Acute-on-chronic respiratory failure    Epistaxis    Chronic rhinitis    History of pneumonia    Supplemental oxygen dependent    Past Medical History:   Diagnosis Date    Arthritis     Atrial fibrillation with rapid ventricular response 05/31/2019    Blindness of left eye     BPH with obstruction/lower urinary tract symptoms      Cancer     stomach & lung    CHF (congestive heart failure)     CKD (chronic kidney disease) stage 3, GFR 30-59 ml/min     COPD with acute exacerbation 08/03/2024    Coronary artery disease     Elevated cholesterol     Essential hypertension 10/02/2017    Fibrotic lung diseases     GERD (gastroesophageal reflux disease)     Hearing loss     History of transfusion     Hydronephrosis of left kidney     Hyperlipidemia     Hypertension     pt was taken off of all of his medications for BP (atenolol, lisinopril, lasix) because his BP kept bottoming out so his primary dr told him to discontinue them 1-2 months ago (Jan/Feb 2019). pt states he takes no medications currently.    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 Patel is just going to keep an eye on it and not do surgery right now.    Mediastinal adenopathy 10/24/2018    [facility]    Monoclonal gammopathy of unknown significance (MGUS) 09/11/2018    Pancreatic mass     pt states he had this in 2013 but it went away on its own. Now recent CT shows it has come back so he is going to have an ultrasound on 3/13/19.    Shortness of breath         Plan:        Acute on chronic hypoxic respiratory failure  Stage 4 metastatic non small cell lung cancer  COPD  Interstitial lung disease  Chronic diastolic CHF  CKD3  Hyperkalemia  Multifactorial anemia  Anxiety  Hemorrhoids  Constipation  Continue current treatment. Monitor counts. Increase activity. Labs Monday. Continue oxygen weaning as tolerated under direction of pulmonology. Aggressive therapies as tolerated. Maintain patient safety.       Electronically signed by MARITZA Greenwood on 10/11/2024 at 09:13 CDT     I have discussed the care of Karoline Vásquez Prater, including pertinent history and exam findings, with the nurse practitioner.    I have seen and  examined the patient and the key elements of all parts of the encounter have been performed by me.  I agree with the assessment, plan and orders as documented by MARITZA Frias, after I modified the exam findings and the plan of treatments and the final version is my approved version of the assessment.        Electronically signed by Edmond Izquierdo MD on 10/11/2024 at 18:47 CDT

## 2024-10-12 PROCEDURE — 97116 GAIT TRAINING THERAPY: CPT

## 2024-10-12 PROCEDURE — 97530 THERAPEUTIC ACTIVITIES: CPT

## 2024-10-12 PROCEDURE — 99233 SBSQ HOSP IP/OBS HIGH 50: CPT | Performed by: INTERNAL MEDICINE

## 2024-10-12 PROCEDURE — 63710000001 PREDNISONE PER 1 MG: Performed by: INTERNAL MEDICINE

## 2024-10-12 NOTE — PROGRESS NOTES
ROBSON Borjas APRN        Internal Medicine Progress Note    10/12/2024   16:26 CDT    Name:  Karoline Prater  MRN:    [id number]     Acct:     [id number]   Room:  [address] [place] [address] Day: 0     Admit Date: 10/7/2024  4:53 PM  PCP: Irving Alexis MD    Subjective:     C/C: weakness, shortness of breath    Interval History: Status:  improved. Up to chair. No family at bedside. Afebrile. Maintaining adequate 02 sats with 02 at 3 lpm at rest. Still desats and has increased oxygen demand with exertion.  Denies pain.  Progressing with therapies.     Review of Systems   Constitutional: Positive for malaise/fatigue. Negative for chills, decreased appetite, weight gain and weight loss.   HENT:  Negative for congestion, ear discharge, hoarse voice and tinnitus.    Eyes:  Negative for blurred vision, discharge, visual disturbance and visual halos.   Cardiovascular:  Positive for dyspnea on exertion. Negative for chest pain, claudication, irregular heartbeat, leg swelling, orthopnea and paroxysmal nocturnal dyspnea.   Respiratory:  Positive for shortness of breath. Negative for cough, sputum production and wheezing.    Endocrine: Negative for cold intolerance, heat intolerance and polyuria.   Hematologic/Lymphatic: Negative for adenopathy. Does not bruise/bleed easily.   Skin:  Negative for dry skin, itching and suspicious lesions.   Musculoskeletal:  Negative for arthritis, back pain, falls, joint pain, muscle weakness and myalgias.   Gastrointestinal:  Negative for abdominal pain, constipation, diarrhea, dysphagia and hematemesis.   Genitourinary:  Negative for bladder incontinence, dysuria and frequency.   Neurological:  Positive for weakness. Negative for aphonia, disturbances in coordination and dizziness.   Psychiatric/Behavioral:  Negative for altered mental status, depression, memory loss and substance abuse. The patient does not have insomnia and is not nervous/anxious.           Medications:     Allergies:   Allergies   Allergen Reactions    Penicillins Hives       Current Meds:   Current Facility-Administered Medications:     acetaminophen (TYLENOL) tablet 1,000 mg, 1,000 mg, Oral, Nightly, Edmond Izquierdo MD    aspirin EC tablet 81 mg, 81 mg, Oral, Daily, Edmond Izquierdo MD    atorvastatin (LIPITOR) tablet 20 mg, 20 mg, Oral, Nightly, Edmond Izquierdo MD    bisacodyl (DULCOLAX) EC tablet 5 mg, 5 mg, Oral, Daily PRN, Edmond Izquierdo MD    bisacodyl (DULCOLAX) suppository 10 mg, 10 mg, Rectal, Daily PRN, Edmond Izquierdo MD    budesonide (PULMICORT) nebulizer solution 0.5 mg, 0.5 mg, Nebulization, BID - RT, Tarik Crocker MD    busPIRone (BUSPAR) tablet 5 mg, 5 mg, Oral, TID With Meals, Edmond Izquierdo MD    cetirizine (zyrTEC) tablet 10 mg, 10 mg, Oral, Daily, Edmond Izquierdo MD    diphenhydrAMINE (BENADRYL) capsule 25 mg, 25 mg, Oral, Nightly PRN, Edmond Izquierdo MD    empagliflozin (JARDIANCE) tablet 10 mg, 10 mg, Oral, Daily, Edmond Izquierdo MD    guaiFENesin (MUCINEX) 12 hr tablet 600 mg, 600 mg, Oral, Q12H, Edmond Izquierdo MD    HYDROcodone-acetaminophen (NORCO) 5-325 MG per tablet 1 tablet, 1 tablet, Oral, TID, Edmond Izquierdo MD    Hydrocort-Pramoxine (Perianal) (PROCTOFOAM-HS) 1-1 % rectal foam 1 Application, 1 Application, Rectal, Daily, Edmond Izquierdo MD    ipratropium-albuterol (DUO-NEB) nebulizer solution 3 mL, 3 mL, Nebulization, 4x Daily - RT, Edmond Izquierdo MD    isosorbide mononitrate (IMDUR) 24 hr tablet 30 mg, 30 mg, Oral, Daily Before Lunch, Edmond Izquierdo MD    melatonin tablet 10 mg, 10 mg, Oral, Nightly, Edmond Izquierdo MD    metoprolol succinate XL (TOPROL-XL) 24 hr tablet 25 mg, 25 mg, Oral, Daily, Edmond Izquierdo MD    nitroglycerin (NITROSTAT) SL tablet 0.4 mg, 0.4 mg, Sublingual, Q5 Min PRN, Edmond Izquierdo MD    ondansetron ODT  "(ZOFRAN-ODT) disintegrating tablet 4 mg, 4 mg, Oral, Q6H PRN **OR** ondansetron (ZOFRAN) injection 4 mg, 4 mg, Intravenous, Q6H PRN, Edmond Izquierdo MD    pantoprazole (PROTONIX) EC tablet 40 mg, 40 mg, Oral, Daily, Edmond Izquierdo MD    polyethylene glycol (MIRALAX) packet 17 g, 17 g, Oral, Daily PRN, Edmond Izquierdo MD    predniSONE (DELTASONE) tablet 40 mg, 40 mg, Oral, Daily With Breakfast, Edmond Izquierdo MD    sennosides-docusate (PERICOLACE) 8.6-50 MG per tablet 2 tablet, 2 tablet, Oral, BID, Edmond Izquierdo MD    sodium bicarbonate tablet 650 mg, 650 mg, Oral, TID, Edmond Izquierdo MD    sodium chloride 0.9 % bolus 250 mL, 250 mL, Intravenous, PRN, Edmond Izquierdo MD    sodium chloride nasal spray 2 spray, 2 spray, Each Nare, 5x Daily, Edmond Izquierdo MD    sodium chloride nasal spray 2 spray, 2 spray, Each Nare, Continuous PRN, Edmond Izquierdo MD    tamsulosin (FLOMAX) 24 hr capsule 0.4 mg, 0.4 mg, Oral, Nightly, Edmond Izquierdo MD    traZODone (DESYREL) tablet 25 mg, 25 mg, Oral, Nightly PRN, Edmond Izquierdo MD    zolpidem (AMBIEN) tablet 2.5 mg, 2.5 mg, Oral, Nightly PRN, Edmond Izquierdo MD    Data:     Code Status:    There are no questions and answers to display.       Family History   Problem Relation Age of Onset    Hypertension Mother     Leukemia Father        Social History     Socioeconomic History    Marital status: Single   Tobacco Use    Smoking status: Former     Current packs/day: 0.00     Types: Cigarettes     Start date: 10/29/1954     Quit date: 10/29/2003     Years since quittin.9    Smokeless tobacco: Former     Types: Chew     Quit date:    Vaping Use    Vaping status: Never Used   Substance and Sexual Activity    Alcohol use: No    Drug use: No    Sexual activity: Defer       Vitals:  Ht 162.6 cm (64\")   Wt 71.3 kg (157 lb 3 oz)   BMI 26.98 kg/m²   T 98.3 P 81 R 22 /72 Sp02 99% (3 " lpm)          I/O (24Hr):  No intake or output data in the 24 hours ending 10/12/24 1626    Labs and imaging:      No results found for this or any previous visit (from the past 12 hours).                              Physical Examination:        Physical Exam  Vitals and nursing note reviewed.   Constitutional:       Appearance: He is well-developed.   HENT:      Head: Normocephalic and atraumatic.      Nose: Nose normal.      Mouth/Throat:      Mouth: Mucous membranes are moist.      Pharynx: Oropharynx is clear.   Eyes:      Pupils: Pupils are equal, round, and reactive to light.      Comments: Left eye enucleated   Cardiovascular:      Rate and Rhythm: Normal rate and regular rhythm.      Heart sounds: Normal heart sounds.   Pulmonary:      Effort: Pulmonary effort is normal.      Breath sounds: Normal breath sounds.   Abdominal:      General: Bowel sounds are normal.      Palpations: Abdomen is soft.   Musculoskeletal:         General: Normal range of motion.      Cervical back: Normal range of motion and neck supple.      Comments: Generalized weakness   Skin:     General: Skin is warm and dry.   Neurological:      Mental Status: He is alert and oriented to person, place, and time.      Deep Tendon Reflexes: Reflexes are normal and symmetric.   Psychiatric:         Behavior: Behavior normal.           Assessment:             Stage 3b chronic kidney disease    A-fib    Former smoker    COPD (chronic obstructive pulmonary disease)    History of radiation therapy    Chronic heart failure with preserved ejection fraction (HFpEF)    Atrial flutter    COVID-19 virus infection    Acute-on-chronic respiratory failure    Epistaxis    Chronic rhinitis    History of pneumonia    Supplemental oxygen dependent    Past Medical History:   Diagnosis Date    Arthritis     Atrial fibrillation with rapid ventricular response 05/31/2019    Blindness of left eye     BPH with obstruction/lower urinary tract symptoms     Cancer      stomach & lung    CHF (congestive heart failure)     CKD (chronic kidney disease) stage 3, GFR 30-59 ml/min     COPD with acute exacerbation 08/03/2024    Coronary artery disease     Elevated cholesterol     Essential hypertension 10/02/2017    Fibrotic lung diseases     GERD (gastroesophageal reflux disease)     Hearing loss     History of transfusion     Hydronephrosis of left kidney     Hyperlipidemia     Hypertension     pt was taken off of all of his medications for BP (atenolol, lisinopril, lasix) because his BP kept bottoming out so his primary dr told him to discontinue them 1-2 months ago (Jan/Feb 2019). pt states he takes no medications currently.    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 Patel is just going to keep an eye on it and not do surgery right now.    Mediastinal adenopathy 10/24/2018    Station [facility]    Monoclonal gammopathy of unknown significance (MGUS) 09/11/2018    Pancreatic mass     pt states he had this in 2013 but it went away on its own. Now recent CT shows it has come back so he is going to have an ultrasound on 3/13/19.    Shortness of breath         Plan:        Acute on chronic hypoxic respiratory failure  Stage 4 metastatic non small cell lung cancer  COPD  Interstitial lung disease  Chronic diastolic CHF  CKD3  Hyperkalemia  Multifactorial anemia  Anxiety  Hemorrhoids  Constipation  Continue current treatment. Monitor counts. Increase activity. Labs Monday. Continue oxygen weaning as tolerated under direction of pulmonology. Aggressive therapies as tolerated. Maintain patient safety.       Electronically signed by MARITZA Greenwood on 10/12/2024 at 16:26 CDT

## 2024-10-12 NOTE — PROGRESS NOTES
[facility] PULMONARY AND CRITICAL CARE PROGRESS NOTE - [facility]  Patient: Karoline Prater    1940   Acct# [id number]  10/12/24   07:00 CDT  Referring Provider: Edmond Izquierdo MD  Chief Complaint: Shortness of breath  Interval history: Afebrile.  Saturation 94 on baseline liter flow of 4 L.  No labs or imaging for review.  He is tapering off of prednisone.  He is working well with physical therapy.  He reports he is starting to feel better and getting stronger.  No new complaints  Physical Exam:  Vital signs: T: Unavailable   BP: 139/72   P: 73   R: 17   sat: 94  Physical Exam  Vitals reviewed.   Constitutional:       Appearance: He is well-developed.      Interventions: Nasal cannula in place.   Cardiovascular:      Rate and Rhythm: Normal rate.   Pulmonary:      Effort: Pulmonary effort is normal.      Breath sounds: Decreased breath sounds present.   Musculoskeletal:         General: Normal range of motion.      Cervical back: Normal range of motion and neck supple.   Neurological:      Mental Status: He is alert and oriented to person, place, and time.   Psychiatric:         Behavior: Behavior normal.         Thought Content: Thought content normal.         Judgment: Judgment normal.     Electronically signed by MARITZA Martinez, 10/12/2024, 07:00 CDT      Physician Substantive Portion: Medical Decision Making to follow:      Physician substantive portion: medical decision making  Result Review  Laboratory Data:  Results from last 7 days   Lab Units 10/10/24  0510 10/09/24  0451 10/08/24  0330   WBC 10*3/mm3 9.20 8.15 8.28   HEMOGLOBIN g/dL 11.0* 10.7* 10.8*   PLATELETS 10*3/mm3 244 256 240     Results from last 7 days   Lab Units 10/10/24  0509 10/09/24  0451 10/08/24  0330   SODIUM mmol/L 137 138 138   POTASSIUM mmol/L 4.9 5.0 5.4*   CO2 mmol/L 28.0 26.0 26.0   BUN mg/dL 49* 43* 44*   CREATININE mg/dL 1.70* 1.75* 1.57*     Results from last 7 days   Lab Units  10/08/24  0436   PH, ARTERIAL pH units 7.390   PCO2, ARTERIAL mm Hg 47.1*   PO2 ART mm Hg 67.2*     Microbiology Results (last 10 days)       ** No results found for the last 240 hours. **           Recent films:  No radiology results from the last 24 hrs   Personal review of imaging : CXR shows last chest x-ray shows chronic changes in the lung with bilateral distended fluid and emphysematous changes.  No new x-rays done today.      Pulmonary Assessment:  Acute on chronic hypoxic respiratory failure  Dependence on supplemental oxygen  Bilateral pulm infiltrate/post-COVID syndrome  Secondary bacterial pneumonia  History of interstitial lung disease  Chronic congestive diastolic heart failure with preserved EF.  Chronic obstructive pulmonary disease.  No carcinoma the right upper lobe of the lung with metastasis  Status post chemotherapy and radiation treatment  Allergic rhinitis    Recommend/plan:   Patient was seen in the follow-up visit in pulmonary notes in the clinic today.  He was in the bed on 4 L of oxygen which is close to his baseline at home.  He uses 3 liters oxygen at home.  Continue titrating as tolerated.  Keep saturation more than 92%  No epistaxis noted after nasal packing is removed.  Continue saline gel.  He is off Flonase  COVID treatment completed.  Continue bronchodilator treatment and routine respiratory care.  Continue incentive spirometry.  Physical activity as tolerated.   Patient is participating in physical therapy  DVT and stress ulcer prophylaxis pain and anxiety control.  He may be started back on prophylactic anticoagulation if needed.    He is not on any Lovenox or heparin.  Currently only getting aspirin.  Plan for outpatient pulmonary follow-up with  CODE STATUS: Full.  Overall prognosis: Guarded.  We will follow     Time spent by me: 35 min    This visit was performed by both a physician and an [facility] RN.  I performed all aspects of the medical decision making as  documented.    Electronically signed by     Tarik Crocker MD,  Pulmonologist/Intensivist   10/12/2024, 09:34 CDT

## 2024-10-13 PROCEDURE — 97530 THERAPEUTIC ACTIVITIES: CPT

## 2024-10-13 PROCEDURE — 99233 SBSQ HOSP IP/OBS HIGH 50: CPT | Performed by: INTERNAL MEDICINE

## 2024-10-13 PROCEDURE — 97116 GAIT TRAINING THERAPY: CPT

## 2024-10-13 PROCEDURE — 97110 THERAPEUTIC EXERCISES: CPT

## 2024-10-13 RX ORDER — HYDROCODONE BITARTRATE AND ACETAMINOPHEN 5; 325 MG/1; MG/1
1 TABLET ORAL 3 TIMES DAILY
Status: DISPENSED | OUTPATIENT
Start: 2024-10-13 | End: 2024-10-18

## 2024-10-13 RX ORDER — ZOLPIDEM TARTRATE 5 MG/1
2.5 TABLET ORAL NIGHTLY PRN
Status: ACTIVE | OUTPATIENT
Start: 2024-10-13 | End: 2024-10-18

## 2024-10-13 NOTE — PROGRESS NOTES
ROBSON Borjas APRN        Internal Medicine Progress Note    10/13/2024   09:05 CDT    Name:  Karoline Prater  MRN:    [id number]     Acct:     [id number]   Room:  [address] [place] [address] Day: 0     Admit Date: 10/7/2024  4:53 PM  PCP: Irving Alexis MD    Subjective:     C/C: weakness, shortness of breath    Interval History: Status:  improved. Sitting up in bed, finishing breakfast. No family at bedside. Afebrile. Pt states  his nose is stopped up, denies cough sore throat or ear pain. O2 sats stable on 2L.     Review of Systems   Constitutional: Positive for malaise/fatigue. Negative for chills, decreased appetite, weight gain and weight loss.   HENT:  Negative for congestion, ear discharge, hoarse voice and tinnitus.    Eyes:  Negative for blurred vision, discharge, visual disturbance and visual halos.   Cardiovascular:  Positive for dyspnea on exertion. Negative for chest pain, claudication, irregular heartbeat, leg swelling, orthopnea and paroxysmal nocturnal dyspnea.   Respiratory:  Positive for shortness of breath. Negative for cough, sputum production and wheezing.    Endocrine: Negative for cold intolerance, heat intolerance and polyuria.   Hematologic/Lymphatic: Negative for adenopathy. Does not bruise/bleed easily.   Skin:  Negative for dry skin, itching and suspicious lesions.   Musculoskeletal:  Negative for arthritis, back pain, falls, joint pain, muscle weakness and myalgias.   Gastrointestinal:  Negative for abdominal pain, constipation, diarrhea, dysphagia and hematemesis.   Genitourinary:  Negative for bladder incontinence, dysuria and frequency.   Neurological:  Positive for weakness. Negative for aphonia, disturbances in coordination and dizziness.   Psychiatric/Behavioral:  Negative for altered mental status, depression, memory loss and substance abuse. The patient does not have insomnia and is not nervous/anxious.          Medications:     Allergies:    Allergies   Allergen Reactions    Penicillins Hives       Current Meds:   Current Facility-Administered Medications:     acetaminophen (TYLENOL) tablet 1,000 mg, 1,000 mg, Oral, Nightly, Edmond Izquierdo MD    aspirin EC tablet 81 mg, 81 mg, Oral, Daily, Edmond Izquierdo MD    atorvastatin (LIPITOR) tablet 20 mg, 20 mg, Oral, Nightly, Edmond Izquierdo MD    bisacodyl (DULCOLAX) EC tablet 5 mg, 5 mg, Oral, Daily PRN, Edmond Izquierdo MD    bisacodyl (DULCOLAX) suppository 10 mg, 10 mg, Rectal, Daily PRN, Edmond Izquierdo MD    budesonide (PULMICORT) nebulizer solution 0.5 mg, 0.5 mg, Nebulization, BID - RT, Tarik Crocker MD    busPIRone (BUSPAR) tablet 5 mg, 5 mg, Oral, TID With Meals, Edmond Izquierdo MD    cetirizine (zyrTEC) tablet 10 mg, 10 mg, Oral, Daily, Edmond Izquierdo MD    diphenhydrAMINE (BENADRYL) capsule 25 mg, 25 mg, Oral, Nightly PRN, Edmond Izquierdo MD    empagliflozin (JARDIANCE) tablet 10 mg, 10 mg, Oral, Daily, Edmond Izquierdo MD    guaiFENesin (MUCINEX) 12 hr tablet 600 mg, 600 mg, Oral, Q12H, Edmond Izquierdo MD    HYDROcodone-acetaminophen (NORCO) 5-325 MG per tablet 1 tablet, 1 tablet, Oral, TID, Edmond Izquierdo MD    Hydrocort-Pramoxine (Perianal) (PROCTOFOAM-HS) 1-1 % rectal foam 1 Application, 1 Application, Rectal, Daily, Edmond Izquierdo MD    ipratropium-albuterol (DUO-NEB) nebulizer solution 3 mL, 3 mL, Nebulization, 4x Daily - RT, Edmond Izquierdo MD    isosorbide mononitrate (IMDUR) 24 hr tablet 30 mg, 30 mg, Oral, Daily Before Lunch, Edmond Izquierdo MD    melatonin tablet 10 mg, 10 mg, Oral, Nightly, Edmond Izquierdo MD    metoprolol succinate XL (TOPROL-XL) 24 hr tablet 25 mg, 25 mg, Oral, Daily, Edmond Izquierdo MD    nitroglycerin (NITROSTAT) SL tablet 0.4 mg, 0.4 mg, Sublingual, Q5 Min PRN, Edmond Izquierdo MD    ondansetron ODT (ZOFRAN-ODT) disintegrating tablet 4 mg,  "4 mg, Oral, Q6H PRN **OR** ondansetron (ZOFRAN) injection 4 mg, 4 mg, Intravenous, Q6H PRN, Edmond Izquierdo MD    pantoprazole (PROTONIX) EC tablet 40 mg, 40 mg, Oral, Daily, Edmond Izquierdo MD    polyethylene glycol (MIRALAX) packet 17 g, 17 g, Oral, Daily PRN, Edmond Izquierdo MD    sennosides-docusate (PERICOLACE) 8.6-50 MG per tablet 2 tablet, 2 tablet, Oral, BID, Edmond Izquierdo MD    sodium bicarbonate tablet 650 mg, 650 mg, Oral, TID, Edmond Izquierdo MD    sodium chloride 0.9 % bolus 250 mL, 250 mL, Intravenous, PRN, Edmond Izquierdo MD    sodium chloride nasal spray 2 spray, 2 spray, Each Nare, 5x Daily, Edmond Izquierdo MD    sodium chloride nasal spray 2 spray, 2 spray, Each Nare, Continuous PRN, Edmond Izquierdo MD    tamsulosin (FLOMAX) 24 hr capsule 0.4 mg, 0.4 mg, Oral, Nightly, Edmond Izquierdo MD    traZODone (DESYREL) tablet 25 mg, 25 mg, Oral, Nightly PRN, Edmond Izquierdo MD    zolpidem (AMBIEN) tablet 2.5 mg, 2.5 mg, Oral, Nightly PRN, Edmond Izquierdo MD    Data:     Code Status:    There are no questions and answers to display.       Family History   Problem Relation Age of Onset    Hypertension Mother     Leukemia Father        Social History     Socioeconomic History    Marital status: Single   Tobacco Use    Smoking status: Former     Current packs/day: 0.00     Types: Cigarettes     Start date: 10/29/1954     Quit date: 10/29/2003     Years since quittin.9    Smokeless tobacco: Former     Types: Chew     Quit date:    Vaping Use    Vaping status: Never Used   Substance and Sexual Activity    Alcohol use: No    Drug use: No    Sexual activity: Defer       Vitals:  Ht 162.6 cm (64\")   Wt 71.3 kg (157 lb 3 oz)   BMI 26.98 kg/m²   T 97.9 P 72 R 20 /83 Sp02 96% (2 lpm)          I/O (24Hr):  No intake or output data in the 24 hours ending 10/13/24 0905    Labs and imaging:      No results found for this or any " previous visit (from the past 12 hours).                              Physical Examination:        Physical Exam  Vitals and nursing note reviewed.   Constitutional:       Appearance: He is well-developed.   HENT:      Head: Normocephalic and atraumatic.      Nose: Nose normal.      Mouth/Throat:      Mouth: Mucous membranes are moist.      Pharynx: Oropharynx is clear.   Eyes:      Pupils: Pupils are equal, round, and reactive to light.      Comments: Left eye enucleated   Cardiovascular:      Rate and Rhythm: Normal rate and regular rhythm.      Heart sounds: Normal heart sounds.   Pulmonary:      Effort: Pulmonary effort is normal.      Breath sounds: Normal breath sounds.   Abdominal:      General: Bowel sounds are normal.      Palpations: Abdomen is soft.   Musculoskeletal:         General: Normal range of motion.      Cervical back: Normal range of motion and neck supple.      Comments: Generalized weakness   Skin:     General: Skin is warm and dry.   Neurological:      Mental Status: He is alert and oriented to person, place, and time.      Deep Tendon Reflexes: Reflexes are normal and symmetric.   Psychiatric:         Behavior: Behavior normal.           Assessment:             Stage 3b chronic kidney disease    A-fib    Former smoker    COPD (chronic obstructive pulmonary disease)    History of radiation therapy    Chronic heart failure with preserved ejection fraction (HFpEF)    Atrial flutter    COVID-19 virus infection    Acute-on-chronic respiratory failure    Epistaxis    Chronic rhinitis    History of pneumonia    Supplemental oxygen dependent    Past Medical History:   Diagnosis Date    Arthritis     Atrial fibrillation with rapid ventricular response 05/31/2019    Blindness of left eye     BPH with obstruction/lower urinary tract symptoms     Cancer     stomach & lung    CHF (congestive heart failure)     CKD (chronic kidney disease) stage 3, GFR 30-59 ml/min     COPD with acute exacerbation  08/03/2024    Coronary artery disease     Elevated cholesterol     Essential hypertension 10/02/2017    Fibrotic lung diseases     GERD (gastroesophageal reflux disease)     Hearing loss     History of transfusion     Hydronephrosis of left kidney     Hyperlipidemia     Hypertension     pt was taken off of all of his medications for BP (atenolol, lisinopril, lasix) because his BP kept bottoming out so his primary dr told him to discontinue them 1-2 months ago (Jan/Feb 2019). pt states he takes no medications currently.    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 Patel is just going to keep an eye on it and not do surgery right now.    Mediastinal adenopathy 10/24/2018    [facility]    Monoclonal gammopathy of unknown significance (MGUS) 09/11/2018    Pancreatic mass     pt states he had this in 2013 but it went away on its own. Now recent CT shows it has come back so he is going to have an ultrasound on 3/13/19.    Shortness of breath         Plan:        Acute on chronic hypoxic respiratory failure  Stage 4 metastatic non small cell lung cancer  COPD  Interstitial lung disease  Chronic diastolic CHF  CKD3  Hyperkalemia  Multifactorial anemia  Anxiety  Hemorrhoids  Constipation  Continue current treatment. Monitor counts. Increase activity. Labs Monday. Continue oxygen weaning as tolerated under direction of pulmonology. Aggressive therapies as tolerated. Maintain patient safety.       Electronically signed by MARITZA Remy on 10/13/2024 at 09:05 CDT

## 2024-10-13 NOTE — PROGRESS NOTES
[facility] [place] PULMONARY PROGRESS NOTE - [facility]    Patient: Karoline Prater  1940   MR# [id number]   Acct# [id number]  10/13/24   06:51 CDT  Referring Provider: Edmond Izquierdo MD    Chief Complaint: Shortness of breath    Interval history: Afebrile.  Saturation 97 on 3 L.  Still desaturating some with therapy efforts per staff reports.  He did require 6 L with exertion yesterday.  Known underlying pulmonary fibrosis.  No labs or imaging for review.  He is feeling better.  He is anxious for breakfast    Meds:  acetaminophen, 1,000 mg, Oral, Nightly  aspirin, 81 mg, Oral, Daily  atorvastatin, 20 mg, Oral, Nightly  budesonide, 0.5 mg, Nebulization, BID - RT  busPIRone, 5 mg, Oral, TID With Meals  cetirizine, 10 mg, Oral, Daily  empagliflozin, 10 mg, Oral, Daily  guaiFENesin, 600 mg, Oral, Q12H  HYDROcodone-acetaminophen, 1 tablet, Oral, TID  Hydrocort-Pramoxine (Perianal), 1 Application, Rectal, Daily  ipratropium-albuterol, 3 mL, Nebulization, 4x Daily - RT  isosorbide mononitrate, 30 mg, Oral, Daily Before Lunch  melatonin, 10 mg, Oral, Nightly  metoprolol succinate XL, 25 mg, Oral, Daily  pantoprazole, 40 mg, Oral, Daily  predniSONE, 40 mg, Oral, Daily With Breakfast  senna-docusate sodium, 2 tablet, Oral, BID  sodium bicarbonate, 650 mg, Oral, TID  sodium chloride, 2 spray, Each Nare, 5x Daily  tamsulosin, 0.4 mg, Oral, Nightly      sodium chloride, 2 spray        Physical Exam:  There were no vitals filed for this visit.  Body mass index is 26.98 kg/m².    No intake or output data in the 24 hours ending 10/13/24 0651  Physical Exam  Vitals reviewed.   Constitutional:       Appearance: He is well-developed.      Interventions: Nasal cannula in place.   Cardiovascular:      Rate and Rhythm: Normal rate.   Pulmonary:      Effort: Pulmonary effort is normal.      Breath sounds: Decreased breath sounds present.   Musculoskeletal:         General: Normal range of motion.      Cervical back:  Normal range of motion and neck supple.   Neurological:      Mental Status: He is alert and oriented to person, place, and time.   Psychiatric:         Behavior: Behavior normal.         Thought Content: Thought content normal.         Judgment: Judgment normal.        Electronically signed by MARITZA Martinez, 10/13/2024, 06:51 CDT      Physician Substantive Portion: Medical Decision Making to follow:      Physician substantive portion: medical decision making  Result Review  Laboratory Data:  Results from last 7 days   Lab Units 10/10/24  0510 10/09/24  0451 10/08/24  0330   WBC 10*3/mm3 9.20 8.15 8.28   HEMOGLOBIN g/dL 11.0* 10.7* 10.8*   PLATELETS 10*3/mm3 244 256 240     Results from last 7 days   Lab Units 10/10/24  0509 10/09/24  0451 10/08/24  0330   SODIUM mmol/L 137 138 138   POTASSIUM mmol/L 4.9 5.0 5.4*   CO2 mmol/L 28.0 26.0 26.0   BUN mg/dL 49* 43* 44*   CREATININE mg/dL 1.70* 1.75* 1.57*     Results from last 7 days   Lab Units 10/08/24  0436   PH, ARTERIAL pH units 7.390   PCO2, ARTERIAL mm Hg 47.1*   PO2 ART mm Hg 67.2*     Microbiology Results (last 10 days)       ** No results found for the last 240 hours. **           Recent films:  No radiology results from the last 24 hrs   Personal review of imaging : CXR shows reviewed last imaging study and agree with interpretation.  No new imaging studies done today      Pulmonary Assessment:  Acute on chronic hypoxic respiratory failure  Dependence on supplemental oxygen  Bilateral pulm infiltrate/post-COVID syndrome  Secondary bacterial pneumonia  History of interstitial lung disease  Chronic congestive diastolic heart failure with preserved EF.  Chronic obstructive pulmonary disease.  No carcinoma the right upper lobe of the lung with metastasis  Status post chemotherapy and radiation treatment  Allergic rhinitis    Recommend/plan:   Patient was seen in the follow-up visit in pulmonary notes in the clinic today.  He was sitting up in the chair on  3 L of oxygen which is close to his baseline at home.  He is feeling much better and did not have any respiratory complaints.  Epistaxis has been well-controlled  He uses 3 liters oxygen at home.  Continue oxygen titration and try to keep saturation more than 92%  He is getting humidified oxygen.  Continue saline gel.  He is off Flonase  COVID treatment completed.  Continue bronchodilator treatment and routine respiratory care.  Continue incentive spirometry.  Physical activity as tolerated.  He is doing well with the physical therapy  DVT and stress ulcer prophylaxis pain and anxiety control.  He may be started back on prophylactic anticoagulation if needed.    He is not on any Lovenox or heparin.  Currently only getting aspirin.  Plan for outpatient pulmonary follow-up with  CODE STATUS: Full.  Overall prognosis: Guarded.  We will follow     Time spent by me: 35 min    This visit was performed by both a physician and an Advanced Practice RN.  I performed all aspects of the medical decision making as documented.    Electronically signed by     Tarik Crocker MD,  Pulmonologist/Intensivist   10/13/2024, 16:37 CDT

## 2024-10-14 LAB
ANION GAP SERPL CALCULATED.3IONS-SCNC: 6 MMOL/L (ref 5–15)
ANISOCYTOSIS BLD QL: ABNORMAL
BASOPHILS # BLD MANUAL: 0.08 10*3/MM3 (ref 0–0.2)
BASOPHILS NFR BLD MANUAL: 1 % (ref 0–1.5)
BUN SERPL-MCNC: 45 MG/DL (ref 8–23)
BUN/CREAT SERPL: 26.3 (ref 7–25)
CALCIUM SPEC-SCNC: 10 MG/DL (ref 8.6–10.5)
CHLORIDE SERPL-SCNC: 108 MMOL/L (ref 98–107)
CO2 SERPL-SCNC: 27 MMOL/L (ref 22–29)
CREAT SERPL-MCNC: 1.71 MG/DL (ref 0.76–1.27)
DEPRECATED RDW RBC AUTO: 67 FL (ref 37–54)
EGFRCR SERPLBLD CKD-EPI 2021: 39.5 ML/MIN/1.73
EOSINOPHIL # BLD MANUAL: 0.34 10*3/MM3 (ref 0–0.4)
EOSINOPHIL NFR BLD MANUAL: 4 % (ref 0.3–6.2)
ERYTHROCYTE [DISTWIDTH] IN BLOOD BY AUTOMATED COUNT: 19.1 % (ref 12.3–15.4)
GLUCOSE SERPL-MCNC: 93 MG/DL (ref 65–99)
HCT VFR BLD AUTO: 41.3 % (ref 37.5–51)
HGB BLD-MCNC: 11.8 G/DL (ref 13–17.7)
LYMPHOCYTES # BLD MANUAL: 0.6 10*3/MM3 (ref 0.7–3.1)
LYMPHOCYTES NFR BLD MANUAL: 2 % (ref 5–12)
MCH RBC QN AUTO: 27.8 PG (ref 26.6–33)
MCHC RBC AUTO-ENTMCNC: 28.6 G/DL (ref 31.5–35.7)
MCV RBC AUTO: 97.2 FL (ref 79–97)
METAMYELOCYTES NFR BLD MANUAL: 5.1 % (ref 0–0)
MONOCYTES # BLD: 0.17 10*3/MM3 (ref 0.1–0.9)
MYELOCYTES NFR BLD MANUAL: 10.1 % (ref 0–0)
NEUTROPHILS # BLD AUTO: 5.95 10*3/MM3 (ref 1.7–7)
NEUTROPHILS NFR BLD MANUAL: 68.7 % (ref 42.7–76)
NEUTS BAND NFR BLD MANUAL: 2 % (ref 0–5)
NRBC SPEC MANUAL: 1 /100 WBC (ref 0–0.2)
NT-PROBNP SERPL-MCNC: 2488 PG/ML (ref 0–1800)
OVALOCYTES BLD QL SMEAR: ABNORMAL
PLAT MORPH BLD: NORMAL
PLATELET # BLD AUTO: 197 10*3/MM3 (ref 140–450)
PMV BLD AUTO: 11.6 FL (ref 6–12)
POIKILOCYTOSIS BLD QL SMEAR: ABNORMAL
POLYCHROMASIA BLD QL SMEAR: ABNORMAL
POTASSIUM SERPL-SCNC: 4.8 MMOL/L (ref 3.5–5.2)
RBC # BLD AUTO: 4.25 10*6/MM3 (ref 4.14–5.8)
SODIUM SERPL-SCNC: 141 MMOL/L (ref 136–145)
VARIANT LYMPHS NFR BLD MANUAL: 2 % (ref 0–5)
VARIANT LYMPHS NFR BLD MANUAL: 5.1 % (ref 19.6–45.3)
WBC MORPH BLD: NORMAL
WBC NRBC COR # BLD AUTO: 8.41 10*3/MM3 (ref 3.4–10.8)

## 2024-10-14 PROCEDURE — 97530 THERAPEUTIC ACTIVITIES: CPT

## 2024-10-14 PROCEDURE — 83880 ASSAY OF NATRIURETIC PEPTIDE: CPT | Performed by: NURSE PRACTITIONER

## 2024-10-14 PROCEDURE — 85025 COMPLETE CBC W/AUTO DIFF WBC: CPT | Performed by: NURSE PRACTITIONER

## 2024-10-14 PROCEDURE — 97116 GAIT TRAINING THERAPY: CPT

## 2024-10-14 PROCEDURE — 99232 SBSQ HOSP IP/OBS MODERATE 35: CPT | Performed by: INTERNAL MEDICINE

## 2024-10-14 PROCEDURE — 80048 BASIC METABOLIC PNL TOTAL CA: CPT | Performed by: NURSE PRACTITIONER

## 2024-10-14 PROCEDURE — 97110 THERAPEUTIC EXERCISES: CPT

## 2024-10-14 PROCEDURE — 85007 BL SMEAR W/DIFF WBC COUNT: CPT | Performed by: NURSE PRACTITIONER

## 2024-10-14 PROCEDURE — 63710000001 PREDNISONE PER 1 MG: Performed by: INTERNAL MEDICINE

## 2024-10-14 RX ORDER — PREDNISONE 20 MG/1
20 TABLET ORAL DAILY
Status: DISCONTINUED | OUTPATIENT
Start: 2024-10-15 | End: 2024-10-18

## 2024-10-14 RX ORDER — PREDNISONE 20 MG/1
40 TABLET ORAL DAILY
Status: DISCONTINUED | OUTPATIENT
Start: 2024-10-14 | End: 2024-10-14

## 2024-10-14 NOTE — PROGRESS NOTES
ROBSON Borjas APRN        Internal Medicine Progress Note    10/14/2024   09:19 CDT    Name:  Karoline Prater  MRN:    [id number]     Acct:     [id number]   Room:  [address] [place] [address] Day: 0     Admit Date: 10/7/2024  4:53 PM  PCP: Irving Alexis MD    Subjective:     C/C: weakness, shortness of breath    Interval History: Status:  improved. Up to chair. No family at bedside. Afebrile. Maintaining adequate 02 sats with 02 at 6 lpm. Counts stable. Progressing with therapies.     Review of Systems   Constitutional: Positive for malaise/fatigue. Negative for chills, decreased appetite, weight gain and weight loss.   HENT:  Negative for congestion, ear discharge, hoarse voice and tinnitus.    Eyes:  Negative for blurred vision, discharge, visual disturbance and visual halos.   Cardiovascular:  Positive for dyspnea on exertion. Negative for chest pain, claudication, irregular heartbeat, leg swelling, orthopnea and paroxysmal nocturnal dyspnea.   Respiratory:  Positive for shortness of breath. Negative for cough, sputum production and wheezing.    Endocrine: Negative for cold intolerance, heat intolerance and polyuria.   Hematologic/Lymphatic: Negative for adenopathy. Does not bruise/bleed easily.   Skin:  Negative for dry skin, itching and suspicious lesions.   Musculoskeletal:  Negative for arthritis, back pain, falls, joint pain, muscle weakness and myalgias.   Gastrointestinal:  Negative for abdominal pain, constipation, diarrhea, dysphagia and hematemesis.   Genitourinary:  Negative for bladder incontinence, dysuria and frequency.   Neurological:  Positive for weakness. Negative for aphonia, disturbances in coordination and dizziness.   Psychiatric/Behavioral:  Negative for altered mental status, depression, memory loss and substance abuse. The patient does not have insomnia and is not nervous/anxious.          Medications:     Allergies:   Allergies   Allergen Reactions     Penicillins Hives       Current Meds:   Current Facility-Administered Medications:     acetaminophen (TYLENOL) tablet 1,000 mg, 1,000 mg, Oral, Nightly, Edmond Izquierdo MD    aspirin EC tablet 81 mg, 81 mg, Oral, Daily, Edmond Izquierdo MD    atorvastatin (LIPITOR) tablet 20 mg, 20 mg, Oral, Nightly, Edmond Izquierdo MD    bisacodyl (DULCOLAX) EC tablet 5 mg, 5 mg, Oral, Daily PRN, Edmond Izquierdo MD    bisacodyl (DULCOLAX) suppository 10 mg, 10 mg, Rectal, Daily PRN, Edmond Izquierdo MD    budesonide (PULMICORT) nebulizer solution 0.5 mg, 0.5 mg, Nebulization, BID - RT, Tarik Crocker MD    busPIRone (BUSPAR) tablet 5 mg, 5 mg, Oral, TID With Meals, Edmond Izquierdo MD    cetirizine (zyrTEC) tablet 10 mg, 10 mg, Oral, Daily, Edmond Izquierdo MD    diphenhydrAMINE (BENADRYL) capsule 25 mg, 25 mg, Oral, Nightly PRN, Edmond Izquierdo MD    empagliflozin (JARDIANCE) tablet 10 mg, 10 mg, Oral, Daily, Edmond Izquierdo MD    guaiFENesin (MUCINEX) 12 hr tablet 600 mg, 600 mg, Oral, Q12H, Edmond Izquierdo MD    HYDROcodone-acetaminophen (NORCO) 5-325 MG per tablet 1 tablet, 1 tablet, Oral, TID, Edmond Izquierdo MD    Hydrocort-Pramoxine (Perianal) (PROCTOFOAM-HS) 1-1 % rectal foam 1 Application, 1 Application, Rectal, Daily, Edmond Izquierdo MD    ipratropium-albuterol (DUO-NEB) nebulizer solution 3 mL, 3 mL, Nebulization, 4x Daily - RT, Edmond Izquierdo MD    isosorbide mononitrate (IMDUR) 24 hr tablet 30 mg, 30 mg, Oral, Daily Before Lunch, Edmond Izquierdo MD    melatonin tablet 10 mg, 10 mg, Oral, Nightly, Edmond Izquierdo MD    metoprolol succinate XL (TOPROL-XL) 24 hr tablet 25 mg, 25 mg, Oral, Daily, Edmond Izquierdo MD    nitroglycerin (NITROSTAT) SL tablet 0.4 mg, 0.4 mg, Sublingual, Q5 Min PRN, Edmond Izquierdo MD    ondansetron ODT (ZOFRAN-ODT) disintegrating tablet 4 mg, 4 mg, Oral, Q6H PRN **OR**  "ondansetron (ZOFRAN) injection 4 mg, 4 mg, Intravenous, Q6H PRN, Edmond Izquierdo MD    pantoprazole (PROTONIX) EC tablet 40 mg, 40 mg, Oral, Daily, Edmond Izquierdo MD    polyethylene glycol (MIRALAX) packet 17 g, 17 g, Oral, Daily PRN, Edmond Izquierdo MD    sennosides-docusate (PERICOLACE) 8.6-50 MG per tablet 2 tablet, 2 tablet, Oral, BID, Edmond Izquierdo MD    sodium bicarbonate tablet 650 mg, 650 mg, Oral, TID, Edmond Izquierdo MD    sodium chloride 0.9 % bolus 250 mL, 250 mL, Intravenous, PRN, Edmond Izquierdo MD    sodium chloride nasal spray 2 spray, 2 spray, Each Nare, 5x Daily, Edmond Izquierdo MD    sodium chloride nasal spray 2 spray, 2 spray, Each Nare, Continuous PRN, Edmond Izquierdo MD    tamsulosin (FLOMAX) 24 hr capsule 0.4 mg, 0.4 mg, Oral, Nightly, Edmond Izquierdo MD    traZODone (DESYREL) tablet 25 mg, 25 mg, Oral, Nightly PRN, Edmond Izquierdo MD    zolpidem (AMBIEN) tablet 2.5 mg, 2.5 mg, Oral, Nightly PRN, Edmond Izquierdo MD    Data:     Code Status:    There are no questions and answers to display.       Family History   Problem Relation Age of Onset    Hypertension Mother     Leukemia Father        Social History     Socioeconomic History    Marital status: Single   Tobacco Use    Smoking status: Former     Current packs/day: 0.00     Types: Cigarettes     Start date: 10/29/1954     Quit date: 10/29/2003     Years since quittin.9    Smokeless tobacco: Former     Types: Chew     Quit date:    Vaping Use    Vaping status: Never Used   Substance and Sexual Activity    Alcohol use: No    Drug use: No    Sexual activity: Defer       Vitals:  Ht 162.6 cm (64\")   Wt 74.1 kg (163 lb 6.4 oz)   BMI 28.05 kg/m²   T 97.9 P 72  80 R 20 2 /72 Sp02 96% (6 lpm)          I/O (24Hr):  No intake or output data in the 24 hours ending 10/14/24 0919    Labs and imaging:      Recent Results (from the past 12 hours)   Basic " Metabolic Panel    Collection Time: 10/14/24  5:13 AM    Specimen: Blood   Result Value Ref Range    Glucose 93 65 - 99 mg/dL    BUN 45 (H) 8 - 23 mg/dL    Creatinine 1.71 (H) 0.76 - 1.27 mg/dL    Sodium 141 136 - 145 mmol/L    Potassium 4.8 3.5 - 5.2 mmol/L    Chloride 108 (H) 98 - 107 mmol/L    CO2 27.0 22.0 - 29.0 mmol/L    Calcium 10.0 8.6 - 10.5 mg/dL    BUN/Creatinine Ratio 26.3 (H) 7.0 - 25.0    Anion Gap 6.0 5.0 - 15.0 mmol/L    eGFR 39.5 (L) >60.0 mL/min/1.73   BNP    Collection Time: 10/14/24  5:13 AM    Specimen: Blood   Result Value Ref Range    proBNP 2,488.0 (H) 0.0 - 1,800.0 pg/mL   CBC Auto Differential    Collection Time: 10/14/24  5:13 AM    Specimen: Blood   Result Value Ref Range    WBC 8.41 3.40 - 10.80 10*3/mm3    RBC 4.25 4.14 - 5.80 10*6/mm3    Hemoglobin 11.8 (L) 13.0 - 17.7 g/dL    Hematocrit 41.3 37.5 - 51.0 %    MCV 97.2 (H) 79.0 - 97.0 fL    MCH 27.8 26.6 - 33.0 pg    MCHC 28.6 (L) 31.5 - 35.7 g/dL    RDW 19.1 (H) 12.3 - 15.4 %    RDW-SD 67.0 (H) 37.0 - 54.0 fl    MPV 11.6 6.0 - 12.0 fL    Platelets 197 140 - 450 10*3/mm3   Manual Differential    Collection Time: 10/14/24  5:13 AM    Specimen: Blood   Result Value Ref Range    Neutrophil % 68.7 42.7 - 76.0 %    Lymphocyte % 5.1 (L) 19.6 - 45.3 %    Monocyte % 2.0 (L) 5.0 - 12.0 %    Eosinophil % 4.0 0.3 - 6.2 %    Basophil % 1.0 0.0 - 1.5 %    Bands %  2.0 0.0 - 5.0 %    Metamyelocyte % 5.1 (H) 0.0 - 0.0 %    Myelocyte % 10.1 (H) 0.0 - 0.0 %    Atypical Lymphocyte % 2.0 0.0 - 5.0 %    Neutrophils Absolute 5.95 1.70 - 7.00 10*3/mm3    Lymphocytes Absolute 0.60 (L) 0.70 - 3.10 10*3/mm3    Monocytes Absolute 0.17 0.10 - 0.90 10*3/mm3    Eosinophils Absolute 0.34 0.00 - 0.40 10*3/mm3    Basophils Absolute 0.08 0.00 - 0.20 10*3/mm3    nRBC 1.0 (H) 0.0 - 0.2 /100 WBC    Anisocytosis Slight/1+ None Seen    Ovalocytes Slight/1+ None Seen    Poikilocytes Slight/1+ None Seen    Polychromasia Slight/1+ None Seen    WBC Morphology Normal Normal     Platelet Morphology Normal Normal                                 Physical Examination:        Physical Exam  Vitals and nursing note reviewed.   Constitutional:       Appearance: He is well-developed.   HENT:      Head: Normocephalic and atraumatic.      Nose: Nose normal.      Mouth/Throat:      Mouth: Mucous membranes are moist.      Pharynx: Oropharynx is clear.   Eyes:      Pupils: Pupils are equal, round, and reactive to light.      Comments: Left eye enucleated   Cardiovascular:      Rate and Rhythm: Normal rate and regular rhythm.      Heart sounds: Normal heart sounds.   Pulmonary:      Effort: Pulmonary effort is normal.      Breath sounds: Normal breath sounds.   Abdominal:      General: Bowel sounds are normal.      Palpations: Abdomen is soft.   Musculoskeletal:         General: Normal range of motion.      Cervical back: Normal range of motion and neck supple.      Comments: Generalized weakness   Skin:     General: Skin is warm and dry.   Neurological:      Mental Status: He is alert and oriented to person, place, and time.      Deep Tendon Reflexes: Reflexes are normal and symmetric.   Psychiatric:         Behavior: Behavior normal.           Assessment:             Stage 3b chronic kidney disease    A-fib    Former smoker    COPD (chronic obstructive pulmonary disease)    History of radiation therapy    Chronic heart failure with preserved ejection fraction (HFpEF)    Atrial flutter    COVID-19 virus infection    Acute-on-chronic respiratory failure    Epistaxis    Chronic rhinitis    History of pneumonia    Supplemental oxygen dependent    Past Medical History:   Diagnosis Date    Arthritis     Atrial fibrillation with rapid ventricular response 05/31/2019    Blindness of left eye     BPH with obstruction/lower urinary tract symptoms     Cancer     stomach & lung    CHF (congestive heart failure)     CKD (chronic kidney disease) stage 3, GFR 30-59 ml/min     COPD with acute exacerbation 08/03/2024     Coronary artery disease     Elevated cholesterol     Essential hypertension 10/02/2017    Fibrotic lung diseases     GERD (gastroesophageal reflux disease)     Hearing loss     History of transfusion     Hydronephrosis of left kidney     Hyperlipidemia     Hypertension     pt was taken off of all of his medications for BP (atenolol, lisinopril, lasix) because his BP kept bottoming out so his primary dr told him to discontinue them 1-2 months ago (Jan/Feb 2019). pt states he takes no medications currently.    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 Patel is just going to keep an eye on it and not do surgery right now.    Mediastinal adenopathy 10/24/2018    Station [facility]    Monoclonal gammopathy of unknown significance (MGUS) 09/11/2018    Pancreatic mass     pt states he had this in 2013 but it went away on its own. Now recent CT shows it has come back so he is going to have an ultrasound on 3/13/19.    Shortness of breath         Plan:        Acute on chronic hypoxic respiratory failure  Stage 4 metastatic non small cell lung cancer  COPD  Interstitial lung disease  Chronic diastolic CHF  CKD3  Hyperkalemia  Multifactorial anemia  Anxiety  Hemorrhoids  Constipation    Continue current treatment. Monitor counts. Increase activity. Labs Thursday. Continue oxygen weaning as tolerated under direction of pulmonology. Aggressive therapies as tolerated. Maintain patient safety.       Electronically signed by MARITZA Greenwood on 10/14/2024 at 09:19 CDT     I have discussed the care of Karoline Prater, including pertinent history and exam findings, with the nurse practitioner.    I have seen and examined the patient and the key elements of all parts of the encounter have been performed by me.  I agree with the assessment, plan and orders as documented by  MARITZA Frias, after I modified the exam findings and the plan of treatments and the final version is my approved version of the assessment.        Electronically signed by Edmond Izquierdo MD on 10/14/2024 at 21:42 CDT

## 2024-10-15 PROCEDURE — 97116 GAIT TRAINING THERAPY: CPT

## 2024-10-15 PROCEDURE — 63710000001 PREDNISONE PER 1 MG: Performed by: INTERNAL MEDICINE

## 2024-10-15 PROCEDURE — 99232 SBSQ HOSP IP/OBS MODERATE 35: CPT | Performed by: INTERNAL MEDICINE

## 2024-10-15 PROCEDURE — 97110 THERAPEUTIC EXERCISES: CPT

## 2024-10-15 PROCEDURE — 97530 THERAPEUTIC ACTIVITIES: CPT

## 2024-10-15 NOTE — PROGRESS NOTES
ROBSON Borjas APRN        Internal Medicine Progress Note    10/15/2024   11:28 CDT    Name:  Karoline Prater  MRN:    [id number]     Acct:     [id number]   Room:  [address] [place] [address] Day: 0     Admit Date: 10/7/2024  4:53 PM  PCP: Irving Alexis MD    Subjective:     C/C: weakness, shortness of breath    Interval History: Status:  improved. Up to chair. No family at bedside. Afebrile. Maintaining adequate 02 sats with 02 at 4 lpm at rest. Required up yo 7 lpm with activity.  Progressing with therapies.     Review of Systems   Constitutional: Positive for malaise/fatigue. Negative for chills, decreased appetite, weight gain and weight loss.   HENT:  Negative for congestion, ear discharge, hoarse voice and tinnitus.    Eyes:  Negative for blurred vision, discharge, visual disturbance and visual halos.   Cardiovascular:  Positive for dyspnea on exertion. Negative for chest pain, claudication, irregular heartbeat, leg swelling, orthopnea and paroxysmal nocturnal dyspnea.   Respiratory:  Positive for shortness of breath. Negative for cough, sputum production and wheezing.    Endocrine: Negative for cold intolerance, heat intolerance and polyuria.   Hematologic/Lymphatic: Negative for adenopathy. Does not bruise/bleed easily.   Skin:  Negative for dry skin, itching and suspicious lesions.   Musculoskeletal:  Negative for arthritis, back pain, falls, joint pain, muscle weakness and myalgias.   Gastrointestinal:  Negative for abdominal pain, constipation, diarrhea, dysphagia and hematemesis.   Genitourinary:  Negative for bladder incontinence, dysuria and frequency.   Neurological:  Positive for weakness. Negative for aphonia, disturbances in coordination and dizziness.   Psychiatric/Behavioral:  Negative for altered mental status, depression, memory loss and substance abuse. The patient does not have insomnia and is not nervous/anxious.          Medications:     Allergies:    Allergies   Allergen Reactions    Penicillins Hives       Current Meds:   Current Facility-Administered Medications:     acetaminophen (TYLENOL) tablet 1,000 mg, 1,000 mg, Oral, Nightly, Edmond Izquierdo MD    aspirin EC tablet 81 mg, 81 mg, Oral, Daily, Edmond Izquierdo MD    atorvastatin (LIPITOR) tablet 20 mg, 20 mg, Oral, Nightly, Edmond Izquierdo MD    bisacodyl (DULCOLAX) EC tablet 5 mg, 5 mg, Oral, Daily PRN, Edmond Izquierdo MD    bisacodyl (DULCOLAX) suppository 10 mg, 10 mg, Rectal, Daily PRN, Edmond Izquierdo MD    budesonide (PULMICORT) nebulizer solution 0.5 mg, 0.5 mg, Nebulization, BID - RT, Tarik Crocker MD    busPIRone (BUSPAR) tablet 5 mg, 5 mg, Oral, TID With Meals, Edmond Izquierdo MD    cetirizine (zyrTEC) tablet 10 mg, 10 mg, Oral, Daily, Edomnd Izquierdo MD    diphenhydrAMINE (BENADRYL) capsule 25 mg, 25 mg, Oral, Nightly PRN, Edmond Izquierdo MD    empagliflozin (JARDIANCE) tablet 10 mg, 10 mg, Oral, Daily, Edmond Izquierdo MD    guaiFENesin (MUCINEX) 12 hr tablet 600 mg, 600 mg, Oral, Q12H, Edmond Izquierdo MD    HYDROcodone-acetaminophen (NORCO) 5-325 MG per tablet 1 tablet, 1 tablet, Oral, TID, Edmond Izquierdo MD    Hydrocort-Pramoxine (Perianal) (PROCTOFOAM-HS) 1-1 % rectal foam 1 Application, 1 Application, Rectal, Daily, dEmond Izquierdo MD    ipratropium-albuterol (DUO-NEB) nebulizer solution 3 mL, 3 mL, Nebulization, 4x Daily - RT, Edmond Izquierdo MD    isosorbide mononitrate (IMDUR) 24 hr tablet 30 mg, 30 mg, Oral, Daily Before Lunch, Edmond Izquierdo MD    melatonin tablet 10 mg, 10 mg, Oral, Nightly, Edmond Izquierdo MD    metoprolol succinate XL (TOPROL-XL) 24 hr tablet 25 mg, 25 mg, Oral, Daily, Edmond Izquierdo MD    nitroglycerin (NITROSTAT) SL tablet 0.4 mg, 0.4 mg, Sublingual, Q5 Min PRN, Edmond Izquierdo MD    ondansetron ODT (ZOFRAN-ODT) disintegrating tablet 4 mg,  "4 mg, Oral, Q6H PRN **OR** ondansetron (ZOFRAN) injection 4 mg, 4 mg, Intravenous, Q6H PRN, Edmond Izquierdo MD    pantoprazole (PROTONIX) EC tablet 40 mg, 40 mg, Oral, Daily, Edmond Izquierdo MD    polyethylene glycol (MIRALAX) packet 17 g, 17 g, Oral, Daily PRN, Edmond Izquierdo MD    predniSONE (DELTASONE) tablet 20 mg, 20 mg, Oral, Daily, Gareth Becerra MD    sennosides-docusate (PERICOLACE) 8.6-50 MG per tablet 2 tablet, 2 tablet, Oral, BID, Edmond Izquierdo MD    sodium bicarbonate tablet 650 mg, 650 mg, Oral, TID, Edmond Izquierdo MD    sodium chloride 0.9 % bolus 250 mL, 250 mL, Intravenous, PRN, Edmond Izquierdo MD    sodium chloride nasal spray 2 spray, 2 spray, Each Nare, 5x Daily, Edmond Izquierdo MD    sodium chloride nasal spray 2 spray, 2 spray, Each Nare, Continuous PRN, Edmond Izquierdo MD    tamsulosin (FLOMAX) 24 hr capsule 0.4 mg, 0.4 mg, Oral, Nightly, Edmond Izquierdo MD    traZODone (DESYREL) tablet 25 mg, 25 mg, Oral, Nightly PRN, Edmond Izquierdo MD    zolpidem (AMBIEN) tablet 2.5 mg, 2.5 mg, Oral, Nightly PRN, Edmond Izquierdo MD    Data:     Code Status:    There are no questions and answers to display.       Family History   Problem Relation Age of Onset    Hypertension Mother     Leukemia Father        Social History     Socioeconomic History    Marital status: Single   Tobacco Use    Smoking status: Former     Current packs/day: 0.00     Types: Cigarettes     Start date: 10/29/1954     Quit date: 10/29/2003     Years since quittin.9    Smokeless tobacco: Former     Types: Chew     Quit date:    Vaping Use    Vaping status: Never Used   Substance and Sexual Activity    Alcohol use: No    Drug use: No    Sexual activity: Defer       Vitals:  Ht 162.6 cm (64\")   Wt 74.1 kg (163 lb 6.4 oz)   BMI 28.05 kg/m²   T 97.9 P 83 R 25 2 /77 Sp02 100% (4 lpm)          I/O (24Hr):  No intake or output data in the " 24 hours ending 10/15/24 1128    Labs and imaging:      No results found for this or any previous visit (from the past 12 hours).                                Physical Examination:        Physical Exam  Vitals and nursing note reviewed.   Constitutional:       Appearance: He is well-developed.   HENT:      Head: Normocephalic and atraumatic.      Nose: Nose normal.      Mouth/Throat:      Mouth: Mucous membranes are moist.      Pharynx: Oropharynx is clear.   Eyes:      Pupils: Pupils are equal, round, and reactive to light.      Comments: Left eye enucleated   Cardiovascular:      Rate and Rhythm: Normal rate and regular rhythm.      Heart sounds: Normal heart sounds.   Pulmonary:      Effort: Pulmonary effort is normal.      Breath sounds: Normal breath sounds.   Abdominal:      General: Bowel sounds are normal.      Palpations: Abdomen is soft.   Musculoskeletal:         General: Normal range of motion.      Cervical back: Normal range of motion and neck supple.      Comments: Generalized weakness   Skin:     General: Skin is warm and dry.   Neurological:      Mental Status: He is alert and oriented to person, place, and time.      Deep Tendon Reflexes: Reflexes are normal and symmetric.   Psychiatric:         Behavior: Behavior normal.           Assessment:             Stage 3b chronic kidney disease    A-fib    Former smoker    COPD (chronic obstructive pulmonary disease)    History of radiation therapy    Chronic heart failure with preserved ejection fraction (HFpEF)    Atrial flutter    COVID-19 virus infection    Acute-on-chronic respiratory failure    Epistaxis    Chronic rhinitis    History of pneumonia    Supplemental oxygen dependent    Past Medical History:   Diagnosis Date    Arthritis     Atrial fibrillation with rapid ventricular response 05/31/2019    Blindness of left eye     BPH with obstruction/lower urinary tract symptoms     Cancer     stomach & lung    CHF (congestive heart failure)     CKD  (chronic kidney disease) stage 3, GFR 30-59 ml/min     COPD with acute exacerbation 08/03/2024    Coronary artery disease     Elevated cholesterol     Essential hypertension 10/02/2017    Fibrotic lung diseases     GERD (gastroesophageal reflux disease)     Hearing loss     History of transfusion     Hydronephrosis of left kidney     Hyperlipidemia     Hypertension     pt was taken off of all of his medications for BP (atenolol, lisinopril, lasix) because his BP kept bottoming out so his primary dr told him to discontinue them 1-2 months ago (Jan/Feb 2019). pt states he takes no medications currently.    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 Patel is just going to keep an eye on it and not do surgery right now.    Mediastinal adenopathy 10/24/2018    Station 7    Monoclonal gammopathy of unknown significance (MGUS) 09/11/2018    Pancreatic mass     pt states he had this in 2013 but it went away on its own. Now recent CT shows it has come back so he is going to have an ultrasound on 3/13/19.    Shortness of breath         Plan:        Acute on chronic hypoxic respiratory failure  Stage 4 metastatic non small cell lung cancer  COPD  Interstitial lung disease  Chronic diastolic CHF  CKD3  Hyperkalemia  Multifactorial anemia  Anxiety  Hemorrhoids  Constipation    Continue current treatment. Monitor counts. Increase activity. Labs Thursday. Continue oxygen weaning as tolerated under direction of pulmonology. Aggressive therapies as tolerated. Maintain patient safety.       Electronically signed by MARITZA Greenwood on 10/15/2024 at 11:28 CDT

## 2024-10-15 NOTE — PROGRESS NOTES
"     Inpatient Progress Note        Results Review:   Results from last 7 days   Lab Units 10/14/24  0513 10/10/24  0509 10/09/24  0451   SODIUM mmol/L 141 137 138   POTASSIUM mmol/L 4.8 4.9 5.0   CHLORIDE mmol/L 108* 101 104   CO2 mmol/L 27.0 28.0 26.0   BUN mg/dL 45* 49* 43*   CALCIUM mg/dL 10.0 10.5 10.0     Results from last 7 days   Lab Units 10/14/24  0513 10/10/24  0510 10/09/24  0451   WBC 10*3/mm3 8.41 9.20 8.15   HEMOGLOBIN g/dL 11.8* 11.0* 10.7*   HEMATOCRIT % 41.3 39.0 36.7*   PLATELETS 10*3/mm3 197 244 256       Visit Vitals  Ht 162.6 cm (64\")   Wt 74.1 kg (163 lb 6.4 oz)   BMI 28.05 kg/m²            Medications:   acetaminophen, 1,000 mg, Oral, Nightly  aspirin, 81 mg, Oral, Daily  atorvastatin, 20 mg, Oral, Nightly  budesonide, 0.5 mg, Nebulization, BID - RT  busPIRone, 5 mg, Oral, TID With Meals  cetirizine, 10 mg, Oral, Daily  empagliflozin, 10 mg, Oral, Daily  guaiFENesin, 600 mg, Oral, Q12H  HYDROcodone-acetaminophen, 1 tablet, Oral, TID  Hydrocort-Pramoxine (Perianal), 1 Application, Rectal, Daily  ipratropium-albuterol, 3 mL, Nebulization, 4x Daily - RT  isosorbide mononitrate, 30 mg, Oral, Daily Before Lunch  melatonin, 10 mg, Oral, Nightly  metoprolol succinate XL, 25 mg, Oral, Daily  pantoprazole, 40 mg, Oral, Daily  predniSONE, 20 mg, Oral, Daily  senna-docusate sodium, 2 tablet, Oral, BID  sodium bicarbonate, 650 mg, Oral, TID  sodium chloride, 2 spray, Each Nare, 5x Daily  tamsulosin, 0.4 mg, Oral, Nightly      sodium chloride, 2 spray                                             NAME: Karoline Prater  MRN: [id number]  AGE: 84 y.o.            : 1940  ADMISSION DATE: 10/7/2024  PRIMARY CARE PROVIDER: Irving Alexis MD  ATTENDING PHYSICIAN: Edmond Izquierdo MD                       Reason for Consult:  Dyspnea, hypoxia    Interval History:    No acute events overnight, patient resting in the bedside chair.  He is currently on 6 L supplemental oxygen.  He does have " increased work of breathing though he did just work with PT.  Sat in the upper 90s, oxygen decreased to 5 L.    Review of Systems:  A full 12-point review of systems was performed and was negative except as documented in the HPI.                       Physical Exam:  General: No acute distress, cooperative  Head: Atraumatic, normocephalic, normal inspection  Eyes: EOMI, normal inspection  ENT: Mucous membranes moist, no thrush  Teeth/gums: Normal inspection  Heart: RRR, no murmur  Lungs: Decreased breath sounds bilaterally with rales  MSK: No edema or clubbing  GI/abdominal: Soft, nontender  Neurologic: Alert, oriented.  Cranial nerves II through XII grossly intact.           Imaging Review:   No new imaging for review.                       Problem List:  COPD with acute exacerbation, improving  Stable pulmonary fibrosis  Acute on chronic hypoxic respiratory failure  Recent COVID-19 pneumonia  Right upper lobe adenocarcinoma           Recommendations:   -Continue supplemental oxygen as needed wean as tolerated, goal O2 sat of 88-92%  -PT/OT and ambulation  -Frequent incentive spirometer  -Will continue to taper prednisone    Thank you for allowing us to participate in this patient's care. We will continue to follow.             Cornelio Gordon DO  Pulmonary/Critical Care  10/15/2024  13:06 CDT

## 2024-10-15 NOTE — PROGRESS NOTES
[facility] – [place] PULMONARY AND CRITICAL CARE PROGRESS NOTE - [facility]    Patient: Karoline Prater  1940   MR# [id number]   Acct# [id number]  10/14/24   20:05 CDT  Referring Provider: Edmond Izquierdo MD    Chief Complaint: Shortness of breath and hypoxia  Interval history: He reports feeling overall much better compared to his arrival.  Remains on steroids.  I discussed this with pharmacy today and we will taper those.  He continues on 3 L/min of oxygen at rest 6 L/min with exertion.  No worsening no increased cough no increased wheezing.  Meds:  acetaminophen, 1,000 mg, Oral, Nightly  aspirin, 81 mg, Oral, Daily  atorvastatin, 20 mg, Oral, Nightly  budesonide, 0.5 mg, Nebulization, BID - RT  busPIRone, 5 mg, Oral, TID With Meals  cetirizine, 10 mg, Oral, Daily  empagliflozin, 10 mg, Oral, Daily  guaiFENesin, 600 mg, Oral, Q12H  HYDROcodone-acetaminophen, 1 tablet, Oral, TID  Hydrocort-Pramoxine (Perianal), 1 Application, Rectal, Daily  ipratropium-albuterol, 3 mL, Nebulization, 4x Daily - RT  isosorbide mononitrate, 30 mg, Oral, Daily Before Lunch  melatonin, 10 mg, Oral, Nightly  metoprolol succinate XL, 25 mg, Oral, Daily  pantoprazole, 40 mg, Oral, Daily  [START ON 10/15/2024] predniSONE, 20 mg, Oral, Daily  senna-docusate sodium, 2 tablet, Oral, BID  sodium bicarbonate, 650 mg, Oral, TID  sodium chloride, 2 spray, Each Nare, 5x Daily  tamsulosin, 0.4 mg, Oral, Nightly      sodium chloride, 2 spray      Physical Exam:  Temperature 97.9 pulse 80 respirations 22 blood pressure 120/72 saturation 96  Physical Exam  Constitutional:       Appearance: Normal appearance. He is not ill-appearing or diaphoretic.   Eyes:      Extraocular Movements: Extraocular movements intact.   Pulmonary:      Effort: Pulmonary effort is normal. No respiratory distress.      Breath sounds: Rales present. No rhonchi.   Skin:     Findings: No erythema or rash.   Neurological:      Mental Status: He is alert.        Laboratory Data:  Results from last 7 days   Lab Units 10/14/24  0513 10/10/24  0510 10/09/24  0451   WBC 10*3/mm3 8.41 9.20 8.15   HEMOGLOBIN g/dL 11.8* 11.0* 10.7*   PLATELETS 10*3/mm3 197 244 256     Results from last 7 days   Lab Units 10/14/24  0513 10/10/24  0509 10/09/24  0451   SODIUM mmol/L 141 137 138   POTASSIUM mmol/L 4.8 4.9 5.0   BUN mg/dL 45* 49* 43*   CREATININE mg/dL 1.71* 1.70* 1.75*     Results from last 7 days   Lab Units 10/08/24  0436   PH, ARTERIAL pH units 7.390   PCO2, ARTERIAL mm Hg 47.1*   PO2 ART mm Hg 67.2*     Microbiology Results (last 10 days)       ** No results found for the last 240 hours. **          Recent films:  No radiology results for the last day    Pulmonary Assessment:  COPD exacerbation improved  Pulmonary fibrosis stable  Chronic respiratory failure with hypoxia, compensated with supplemental oxygen  Deconditioning    Recommend:   Continue physical therapy and mobilization  Cut prednisone from 40 mg a day to 20 mg a day  Continue oxygen for saturation 90    Electronically signed by Gareth Becerra MD, 10/14/24, 20:05 CDT

## 2024-10-16 ENCOUNTER — TELEPHONE (OUTPATIENT)
Dept: HEMATOLOGY | Age: 84
End: 2024-10-16

## 2024-10-16 PROCEDURE — 97116 GAIT TRAINING THERAPY: CPT

## 2024-10-16 PROCEDURE — 97110 THERAPEUTIC EXERCISES: CPT

## 2024-10-16 PROCEDURE — 63710000001 PREDNISONE PER 1 MG: Performed by: INTERNAL MEDICINE

## 2024-10-16 PROCEDURE — 63710000001 DIPHENHYDRAMINE PER 50 MG: Performed by: INTERNAL MEDICINE

## 2024-10-16 PROCEDURE — 97530 THERAPEUTIC ACTIVITIES: CPT

## 2024-10-16 PROCEDURE — 99232 SBSQ HOSP IP/OBS MODERATE 35: CPT | Performed by: INTERNAL MEDICINE

## 2024-10-16 RX ORDER — BISACODYL 5 MG/1
5 TABLET, DELAYED RELEASE ORAL DAILY
Status: DISCONTINUED | OUTPATIENT
Start: 2024-10-16 | End: 2024-11-01 | Stop reason: HOSPADM

## 2024-10-16 RX ORDER — HYDROCORTISONE ACETATE 25 MG/1
25 SUPPOSITORY RECTAL 2 TIMES DAILY
Status: DISCONTINUED | OUTPATIENT
Start: 2024-10-16 | End: 2024-10-19

## 2024-10-16 NOTE — PROGRESS NOTES
Adult Nutrition  Assessment/PES    Patient Name:  Karoline Prater  YOB: 1940  MRN: [id number]  Admit Date:  10/7/2024    Assessment Date:  10/16/2024   Reason for Assessment       Row Name 10/16/24 1005          Reason for Assessment    Reason For Assessment follow-up protocol     Diagnosis pulmonary disease;cardiac disease;cancer diagnosis/related complications;renal disease                    Nutrition/Diet History       Row Name 10/16/24 1005          Nutrition/Diet History    Typical Intake (Food/Fluid/EN/PN) PO adequate. Wt up 6.3lb x 1 week. Generalized edema. No skin breakdown. Receiving daily menu selections. Continue with current nutrition care plan.     Food Intolerance(s) NKFA                    Labs/Tests/Procedures/Meds       Row Name 10/16/24 1006          Labs/Procedures/Meds    Lab Results Reviewed reviewed     Lab Results Comments BUN, Cr        Diagnostic Tests/Procedures    Diagnostic Test/Procedure Reviewed reviewed        Medications    Pertinent Medications Reviewed reviewed     Pertinent Medications Comments See MAR                    Physical Findings       Row Name 10/16/24 1006          Physical Findings    Overall Physical Appearance NC, BM 10/12, generlized edema, no skin breakdown                    Estimated/Assessed Needs - Anthropometrics       Row Name 10/16/24 1006          Anthropometrics    Calculation Weight 71.3 kg (157 lb 3 oz)        Estimated/Assessed Needs    Additional Documentation Fluid Requirements (Group);KCAL/KG (Group);Protein Requirements (Group)        KCAL/KG    KCAL/KG 25 Kcal/Kg (kcal)     25 Kcal/Kg (kcal) 1782.5        Protein Requirements    Weight Used For Protein Calculations 71.3 kg (157 lb 3 oz)     Est Protein Requirement Amount (gms/kg) 0.8 gm protein     Estimated Protein Requirements (gms/day) 57.04        Fluid Requirements    Fluid Requirements (mL/day) 1782                    Nutrition Prescription Ordered       Row Name 10/16/24  1006          Nutrition Prescription PO    Current PO Diet Regular                    Evaluation of Received Nutrient/Fluid Intake       Row Name 10/16/24 1006          Nutrient/Fluid Evaluation    Number of Days Evaluated 3 days        Fluid Intake Evaluation    Oral Fluid (mL) 1718        PO Evaluation    Number of Meals 8     % PO Intake 97                       Problem/Interventions:   Problem 1       Row Name 10/16/24 1007          Nutrition Diagnoses Problem 1    Problem 1 Increased Nutrient Needs     Macronutrient Kcal     Etiology (related to) Medical Diagnosis     Pulmonary/Critical Care A/C respiratory failure     Renal CKD     Signs/Symptoms (evidenced by) Unintended Weight Change;% UBW     Percent (%) UBW 96 %  Based on noted UBW of 163lb     Unintended Weight Change Loss     Number of Pounds Lost 6  Based on noted UBW 163lb     Weight loss time period 4 weeks     Resolved? Yes                    Problem 2       Row Name 10/16/24 1007          Nutrition Diagnoses Problem 2    Problem 2 Nutrition Appropriate for Condition at this Time                        Intervention Goal       Row Name 10/16/24 1007          Intervention Goal    General Disease management/therapy;Meet nutritional needs for age/condition     PO Meet estimated needs;Tolerate PO     Weight Maintain weight                    Nutrition Intervention       Row Name 10/16/24 1007          Nutrition Intervention    RD/Tech Action Care plan reviewd;Follow Tx progress                      Education/Evaluation       Row Name 10/16/24 1007          Education    Education No discharge needs identified at this time        Monitor/Evaluation    Monitor Per protocol                     Electronically signed by:  Avelina Garnica RDN, GARCÍA  10/16/24 10:07 CDT

## 2024-10-16 NOTE — PROGRESS NOTES
"     Inpatient Progress Note        Results Review:   Results from last 7 days   Lab Units 10/14/24  0513 10/10/24  0509   SODIUM mmol/L 141 137   POTASSIUM mmol/L 4.8 4.9   CHLORIDE mmol/L 108* 101   CO2 mmol/L 27.0 28.0   BUN mg/dL 45* 49*   CALCIUM mg/dL 10.0 10.5     Results from last 7 days   Lab Units 10/14/24  0513 10/10/24  0510   WBC 10*3/mm3 8.41 9.20   HEMOGLOBIN g/dL 11.8* 11.0*   HEMATOCRIT % 41.3 39.0   PLATELETS 10*3/mm3 197 244       Visit Vitals  Ht 162.6 cm (64\")   Wt 74.1 kg (163 lb 6.4 oz)   BMI 28.05 kg/m²            Medications:   acetaminophen, 1,000 mg, Oral, Nightly  aspirin, 81 mg, Oral, Daily  atorvastatin, 20 mg, Oral, Nightly  budesonide, 0.5 mg, Nebulization, BID - RT  busPIRone, 5 mg, Oral, TID With Meals  cetirizine, 10 mg, Oral, Daily  empagliflozin, 10 mg, Oral, Daily  guaiFENesin, 600 mg, Oral, Q12H  HYDROcodone-acetaminophen, 1 tablet, Oral, TID  Hydrocort-Pramoxine (Perianal), 1 Application, Rectal, Daily  ipratropium-albuterol, 3 mL, Nebulization, 4x Daily - RT  isosorbide mononitrate, 30 mg, Oral, Daily Before Lunch  melatonin, 10 mg, Oral, Nightly  metoprolol succinate XL, 25 mg, Oral, Daily  pantoprazole, 40 mg, Oral, Daily  predniSONE, 20 mg, Oral, Daily  senna-docusate sodium, 2 tablet, Oral, BID  sodium bicarbonate, 650 mg, Oral, TID  sodium chloride, 2 spray, Each Nare, 5x Daily  tamsulosin, 0.4 mg, Oral, Nightly      sodium chloride, 2 spray                                             NAME: Karoline Prater  MRN: [id number]  AGE: 84 y.o.            : 1940  ADMISSION DATE: 10/7/2024  PRIMARY CARE PROVIDER: Irving Alexis MD  ATTENDING PHYSICIAN: Edmond Izquierdo MD                       Reason for Consult:  Dyspnea, hypoxia    Interval History:    No acute events overnight, patient resting in bed breathing comfortably on 5 L.  States his respiratory status is stable.  Continues to work with PT/OT.  Patient is hopeful for discharge to rehab and " eventually home.    Review of Systems:  A full 12-point review of systems was performed and was negative except as documented in the HPI.                       Physical Exam:  General: No acute distress, cooperative  Head: Atraumatic, normocephalic, normal inspection  Eyes: EOMI, normal inspection  ENT: Mucous membranes moist, no thrush  Teeth/gums: Normal inspection  Heart: RRR, no murmur  Lungs: Diminished bilaterally, no wheezing  MSK: No edema or clubbing  GI/abdominal: Soft, nontender  Neurologic: Alert, oriented.  Cranial nerves II through XII grossly intact.           Imaging Review:   No new imaging for review.                       Problem List:  COPD with acute exacerbation, improving  Stable pulmonary fibrosis  Acute on chronic hypoxic respiratory failure  Recent COVID-19 pneumonia  Right upper lobe adenocarcinoma           Recommendations:   -Continue supplemental oxygen as needed and wean as tolerated, goal O2 sat of 88-92%  -Patient still has questions regarding the findings on his CTA for possible lymphangitic spread.  Discussed with Dr. Michele, he will review the CT and visit with the patient tomorrow.  Consult placed  -PT/OT and frequent ambulation  -Frequent incentive spirometer  -Continue prednisone taper, down to 20 mg daily    Thank you for allowing us to participate in this patient's care. We will continue to follow.             Cornelio Gordon DO  Pulmonary/Critical Care  10/16/2024  08:29 CDT

## 2024-10-16 NOTE — PROGRESS NOTES
ROBSON Borjas APRN        Internal Medicine Progress Note    10/16/2024   09:49 CDT    Name:  Karoline Prater  MRN:    [id number]     Acct:     [id number]   Room:  [address] [place] [address] Day: 0     Admit Date: 10/7/2024  4:53 PM  PCP: Irving Alexis MD    Subjective:     C/C: weakness, shortness of breath    Interval History: Status:  improved. Resting in bed.  No family at bedside. Afebrile. Maintaining adequate 02 sats with 02 at 4 lpm at rest.  Progressing with therapies. C/o constipation and hemorrhoids.     Review of Systems   Constitutional: Positive for malaise/fatigue. Negative for chills, decreased appetite, weight gain and weight loss.   HENT:  Negative for congestion, ear discharge, hoarse voice and tinnitus.    Eyes:  Negative for blurred vision, discharge, visual disturbance and visual halos.   Cardiovascular:  Positive for dyspnea on exertion. Negative for chest pain, claudication, irregular heartbeat, leg swelling, orthopnea and paroxysmal nocturnal dyspnea.   Respiratory:  Positive for shortness of breath. Negative for cough, sputum production and wheezing.    Endocrine: Negative for cold intolerance, heat intolerance and polyuria.   Hematologic/Lymphatic: Negative for adenopathy. Does not bruise/bleed easily.   Skin:  Negative for dry skin, itching and suspicious lesions.   Musculoskeletal:  Negative for arthritis, back pain, falls, joint pain, muscle weakness and myalgias.   Gastrointestinal:  Negative for abdominal pain, constipation, diarrhea, dysphagia and hematemesis.   Genitourinary:  Negative for bladder incontinence, dysuria and frequency.   Neurological:  Positive for weakness. Negative for aphonia, disturbances in coordination and dizziness.   Psychiatric/Behavioral:  Negative for altered mental status, depression, memory loss and substance abuse. The patient does not have insomnia and is not nervous/anxious.          Medications:     Allergies:    Allergies   Allergen Reactions    Penicillins Hives       Current Meds:   Current Facility-Administered Medications:     acetaminophen (TYLENOL) tablet 1,000 mg, 1,000 mg, Oral, Nightly, Edmond Izquierdo MD    aspirin EC tablet 81 mg, 81 mg, Oral, Daily, Edmond Izquierdo MD    atorvastatin (LIPITOR) tablet 20 mg, 20 mg, Oral, Nightly, Edmond Izquierdo MD    bisacodyl (DULCOLAX) EC tablet 5 mg, 5 mg, Oral, Daily PRN, Edmond Izquierdo MD    bisacodyl (DULCOLAX) suppository 10 mg, 10 mg, Rectal, Daily PRN, Edmond Izquierdo MD    budesonide (PULMICORT) nebulizer solution 0.5 mg, 0.5 mg, Nebulization, BID - RT, Tarik Crocker MD    busPIRone (BUSPAR) tablet 5 mg, 5 mg, Oral, TID With Meals, Edmond Izquierdo MD    cetirizine (zyrTEC) tablet 10 mg, 10 mg, Oral, Daily, Edmond Izquierdo MD    diphenhydrAMINE (BENADRYL) capsule 25 mg, 25 mg, Oral, Nightly PRN, Emdond Izquierdo MD    empagliflozin (JARDIANCE) tablet 10 mg, 10 mg, Oral, Daily, Edmond Izquierdo MD    guaiFENesin (MUCINEX) 12 hr tablet 600 mg, 600 mg, Oral, Q12H, Edmond Izquierdo MD    HYDROcodone-acetaminophen (NORCO) 5-325 MG per tablet 1 tablet, 1 tablet, Oral, TID, Edmond Izquierdo MD    Hydrocort-Pramoxine (Perianal) (PROCTOFOAM-HS) 1-1 % rectal foam 1 Application, 1 Application, Rectal, Daily, Edmond Izquierdo MD    ipratropium-albuterol (DUO-NEB) nebulizer solution 3 mL, 3 mL, Nebulization, 4x Daily - RT, Edmond Izquierdo MD    isosorbide mononitrate (IMDUR) 24 hr tablet 30 mg, 30 mg, Oral, Daily Before Lunch, Edmond Izquierdo MD    melatonin tablet 10 mg, 10 mg, Oral, Nightly, Edmond Izquierdo MD    metoprolol succinate XL (TOPROL-XL) 24 hr tablet 25 mg, 25 mg, Oral, Daily, Edmond Izquierdo MD    nitroglycerin (NITROSTAT) SL tablet 0.4 mg, 0.4 mg, Sublingual, Q5 Min PRN, Edmond Izquierdo MD    ondansetron ODT (ZOFRAN-ODT) disintegrating tablet 4 mg,  "4 mg, Oral, Q6H PRN **OR** ondansetron (ZOFRAN) injection 4 mg, 4 mg, Intravenous, Q6H PRN, Edmond Izquierdo MD    pantoprazole (PROTONIX) EC tablet 40 mg, 40 mg, Oral, Daily, Edmond Izquierdo MD    polyethylene glycol (MIRALAX) packet 17 g, 17 g, Oral, Daily PRN, Edmond Izquierdo MD    predniSONE (DELTASONE) tablet 20 mg, 20 mg, Oral, Daily, Gareth Becerra MD    sennosides-docusate (PERICOLACE) 8.6-50 MG per tablet 2 tablet, 2 tablet, Oral, BID, Edmond Izquierdo MD    sodium bicarbonate tablet 650 mg, 650 mg, Oral, TID, Edmond Izquierdo MD    sodium chloride 0.9 % bolus 250 mL, 250 mL, Intravenous, PRN, Edmond Izquierdo MD    sodium chloride nasal spray 2 spray, 2 spray, Each Nare, 5x Daily, Edmond Izquierdo MD    sodium chloride nasal spray 2 spray, 2 spray, Each Nare, Continuous PRN, Edmond Izquierdo MD    tamsulosin (FLOMAX) 24 hr capsule 0.4 mg, 0.4 mg, Oral, Nightly, Edmond Izquierdo MD    traZODone (DESYREL) tablet 25 mg, 25 mg, Oral, Nightly PRN, Edmond Izquierdo MD    zolpidem (AMBIEN) tablet 2.5 mg, 2.5 mg, Oral, Nightly PRN, Edmond Izquierdo MD    Data:     Code Status:    There are no questions and answers to display.       Family History   Problem Relation Age of Onset    Hypertension Mother     Leukemia Father        Social History     Socioeconomic History    Marital status: Single   Tobacco Use    Smoking status: Former     Current packs/day: 0.00     Types: Cigarettes     Start date: 10/29/1954     Quit date: 10/29/2003     Years since quittin.9    Smokeless tobacco: Former     Types: Chew     Quit date:    Vaping Use    Vaping status: Never Used   Substance and Sexual Activity    Alcohol use: No    Drug use: No    Sexual activity: Defer       Vitals:  Ht 162.6 cm (64\")   Wt 74.1 kg (163 lb 6.4 oz)   BMI 28.05 kg/m²   T 97.9 P 66 R 20 2 /72 Sp02 97% (4 lpm)          I/O (24Hr):  No intake or output data in the " 24 hours ending 10/16/24 0949    Labs and imaging:      No results found for this or any previous visit (from the past 12 hours).                                Physical Examination:        Physical Exam  Vitals and nursing note reviewed.   Constitutional:       Appearance: He is well-developed.   HENT:      Head: Normocephalic and atraumatic.      Nose: Nose normal.      Mouth/Throat:      Mouth: Mucous membranes are moist.      Pharynx: Oropharynx is clear.   Eyes:      Pupils: Pupils are equal, round, and reactive to light.      Comments: Left eye enucleated   Cardiovascular:      Rate and Rhythm: Normal rate and regular rhythm.      Heart sounds: Normal heart sounds.   Pulmonary:      Effort: Pulmonary effort is normal.      Breath sounds: Normal breath sounds.   Abdominal:      General: Bowel sounds are normal.      Palpations: Abdomen is soft.   Musculoskeletal:         General: Normal range of motion.      Cervical back: Normal range of motion and neck supple.      Comments: Generalized weakness   Skin:     General: Skin is warm and dry.   Neurological:      Mental Status: He is alert and oriented to person, place, and time.      Deep Tendon Reflexes: Reflexes are normal and symmetric.   Psychiatric:         Behavior: Behavior normal.           Assessment:             Stage 3b chronic kidney disease    A-fib    Former smoker    COPD (chronic obstructive pulmonary disease)    History of radiation therapy    Chronic heart failure with preserved ejection fraction (HFpEF)    Atrial flutter    COVID-19 virus infection    Acute-on-chronic respiratory failure    Epistaxis    Chronic rhinitis    History of pneumonia    Supplemental oxygen dependent    Past Medical History:   Diagnosis Date    Arthritis     Atrial fibrillation with rapid ventricular response 05/31/2019    Blindness of left eye     BPH with obstruction/lower urinary tract symptoms     Cancer     stomach & lung    CHF (congestive heart failure)     CKD  (chronic kidney disease) stage 3, GFR 30-59 ml/min     COPD with acute exacerbation 08/03/2024    Coronary artery disease     Elevated cholesterol     Essential hypertension 10/02/2017    Fibrotic lung diseases     GERD (gastroesophageal reflux disease)     Hearing loss     History of transfusion     Hydronephrosis of left kidney     Hyperlipidemia     Hypertension     pt was taken off of all of his medications for BP (atenolol, lisinopril, lasix) because his BP kept bottoming out so his primary dr told him to discontinue them 1-2 months ago (Jan/Feb 2019). pt states he takes no medications currently.    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 Patel is just going to keep an eye on it and not do surgery right now.    Mediastinal adenopathy 10/24/2018    Station [facility]    Monoclonal gammopathy of unknown significance (MGUS) 09/11/2018    Pancreatic mass     pt states he had this in 2013 but it went away on its own. Now recent CT shows it has come back so he is going to have an ultrasound on 3/13/19.    Shortness of breath         Plan:        Acute on chronic hypoxic respiratory failure  Stage 4 metastatic non small cell lung cancer  COPD  Interstitial lung disease  Chronic diastolic CHF  CKD3  Hyperkalemia  Multifactorial anemia  Anxiety  Hemorrhoids  Constipation    Continue current treatment. Monitor counts. Increase activity. Labs in am. Continue oxygen weaning as tolerated under direction of pulmonology. Aggressive therapies as tolerated. Maintain patient safety. Adjust bowel regimen.       Electronically signed by MARITZA Greenwood on 10/16/2024 at 09:49 CDT     I have discussed the care of Stacitami Prater, including pertinent history and exam findings, with the nurse practitioner.    I have seen and examined the patient and the key elements of all  parts of the encounter have been performed by me.  I agree with the assessment, plan and orders as documented by MARITZA Frias, after I modified the exam findings and the plan of treatments and the final version is my approved version of the assessment.        Electronically signed by Edmond Izquierdo MD on 10/16/2024 at 11:04 CDT

## 2024-10-16 NOTE — TELEPHONE ENCOUNTER
CONSULT RECEIVED FROM [facility] AT [facility].  PT HAS BEEN SEEN RECENTLY BY DR. STONE.    NAME:  Sophia \"Prabhjot\" Horner  :     44  ROOM:  [id number]  DX:        Mass in Right Lung upper lobe  CONSULT FROM:  Dr. St  NURSE:  [facility]  [phone]

## 2024-10-17 LAB
ANION GAP SERPL CALCULATED.3IONS-SCNC: 8 MMOL/L (ref 5–15)
BUN SERPL-MCNC: 35 MG/DL (ref 8–23)
BUN/CREAT SERPL: 22.6 (ref 7–25)
CALCIUM SPEC-SCNC: 9.6 MG/DL (ref 8.6–10.5)
CHLORIDE SERPL-SCNC: 105 MMOL/L (ref 98–107)
CO2 SERPL-SCNC: 27 MMOL/L (ref 22–29)
CREAT SERPL-MCNC: 1.55 MG/DL (ref 0.76–1.27)
DEPRECATED RDW RBC AUTO: 67.5 FL (ref 37–54)
EGFRCR SERPLBLD CKD-EPI 2021: 44.4 ML/MIN/1.73
ERYTHROCYTE [DISTWIDTH] IN BLOOD BY AUTOMATED COUNT: 19.1 % (ref 12.3–15.4)
GLUCOSE SERPL-MCNC: 94 MG/DL (ref 65–99)
HCT VFR BLD AUTO: 40.2 % (ref 37.5–51)
HGB BLD-MCNC: 11.9 G/DL (ref 13–17.7)
MCH RBC QN AUTO: 28.4 PG (ref 26.6–33)
MCHC RBC AUTO-ENTMCNC: 29.6 G/DL (ref 31.5–35.7)
MCV RBC AUTO: 95.9 FL (ref 79–97)
PLATELET # BLD AUTO: 157 10*3/MM3 (ref 140–450)
PMV BLD AUTO: 12.4 FL (ref 6–12)
POTASSIUM SERPL-SCNC: 4.7 MMOL/L (ref 3.5–5.2)
RBC # BLD AUTO: 4.19 10*6/MM3 (ref 4.14–5.8)
SODIUM SERPL-SCNC: 140 MMOL/L (ref 136–145)
WBC NRBC COR # BLD AUTO: 9.05 10*3/MM3 (ref 3.4–10.8)

## 2024-10-17 PROCEDURE — 85027 COMPLETE CBC AUTOMATED: CPT | Performed by: INTERNAL MEDICINE

## 2024-10-17 PROCEDURE — 99232 SBSQ HOSP IP/OBS MODERATE 35: CPT | Performed by: INTERNAL MEDICINE

## 2024-10-17 PROCEDURE — 97116 GAIT TRAINING THERAPY: CPT

## 2024-10-17 PROCEDURE — 97530 THERAPEUTIC ACTIVITIES: CPT

## 2024-10-17 PROCEDURE — 80048 BASIC METABOLIC PNL TOTAL CA: CPT | Performed by: INTERNAL MEDICINE

## 2024-10-17 PROCEDURE — 63710000001 PREDNISONE PER 1 MG: Performed by: INTERNAL MEDICINE

## 2024-10-17 NOTE — PROGRESS NOTES
"[facility]        Internal Medicine Progress Note    10/17/2024   11:53 CDT    Name:  Karoline Prater  MRN:    [id number]     Acct:     [id number]   Room:  [address] [place] [address] Day: 0     Admit Date: 10/7/2024  4:53 PM  PCP: Irving Alexis MD    Subjective:     C/C: weakness, shortness of breath    Interval History: Status:  improved. Up to chair.  No family at bedside. Afebrile. Maintaining adequate 02 sats with 02 at 4 lpm at rest.  Progressing with therapies. Reports he had a \"good walk\" earlier today. Renal function stable/improved. Reports that he is feeling better after talking with Dr. Michele yesterday.     Review of Systems   Constitutional: Positive for malaise/fatigue. Negative for chills, decreased appetite, weight gain and weight loss.   HENT:  Negative for congestion, ear discharge, hoarse voice and tinnitus.    Eyes:  Negative for blurred vision, discharge, visual disturbance and visual halos.   Cardiovascular:  Positive for dyspnea on exertion. Negative for chest pain, claudication, irregular heartbeat, leg swelling, orthopnea and paroxysmal nocturnal dyspnea.   Respiratory:  Positive for shortness of breath. Negative for cough, sputum production and wheezing.    Endocrine: Negative for cold intolerance, heat intolerance and polyuria.   Hematologic/Lymphatic: Negative for adenopathy. Does not bruise/bleed easily.   Skin:  Negative for dry skin, itching and suspicious lesions.   Musculoskeletal:  Negative for arthritis, back pain, falls, joint pain, muscle weakness and myalgias.   Gastrointestinal:  Negative for abdominal pain, constipation, diarrhea, dysphagia and hematemesis.   Genitourinary:  Negative for bladder incontinence, dysuria and frequency.   Neurological:  Positive for weakness. Negative for aphonia, disturbances in coordination and dizziness.   Psychiatric/Behavioral:  Negative for altered mental status, depression, memory loss and " substance abuse. The patient does not have insomnia and is not nervous/anxious.          Medications:     Allergies:   Allergies   Allergen Reactions    Penicillins Hives       Current Meds:   Current Facility-Administered Medications:     acetaminophen (TYLENOL) tablet 1,000 mg, 1,000 mg, Oral, Nightly, Edmond Izquierdo MD    aspirin EC tablet 81 mg, 81 mg, Oral, Daily, Edmond Izquierdo MD    atorvastatin (LIPITOR) tablet 20 mg, 20 mg, Oral, Nightly, Edmond Izquierdo MD    bisacodyl (DULCOLAX) EC tablet 5 mg, 5 mg, Oral, Daily, Shahnazdlosrob, Sil Lucy, APRN    bisacodyl (DULCOLAX) suppository 10 mg, 10 mg, Rectal, Daily PRN, Edmond Izquierdo MD    budesonide (PULMICORT) nebulizer solution 0.5 mg, 0.5 mg, Nebulization, BID - RT, Tarik Crocker MD    busPIRone (BUSPAR) tablet 5 mg, 5 mg, Oral, TID With Meals, Edmond Izquierdo MD    cetirizine (zyrTEC) tablet 10 mg, 10 mg, Oral, Daily, Edmond Izquierdo MD    diphenhydrAMINE (BENADRYL) capsule 25 mg, 25 mg, Oral, Nightly PRN, Edmond Izquierdo MD    empagliflozin (JARDIANCE) tablet 10 mg, 10 mg, Oral, Daily, Edmond Izquiedro MD    guaiFENesin (MUCINEX) 12 hr tablet 600 mg, 600 mg, Oral, Q12H, Edmond Izquierdo MD    HYDROcodone-acetaminophen (NORCO) 5-325 MG per tablet 1 tablet, 1 tablet, Oral, TID, Edmond Izquierdo MD    hydrocortisone (ANUSOL-HC) suppository 25 mg, 25 mg, Rectal, BID, Mikey, Sil Lucy, APRN    ipratropium-albuterol (DUO-NEB) nebulizer solution 3 mL, 3 mL, Nebulization, 4x Daily - RT, Edmond Izquierdo MD    isosorbide mononitrate (IMDUR) 24 hr tablet 30 mg, 30 mg, Oral, Daily Before Lunch, Edmond Izquierdo MD    melatonin tablet 10 mg, 10 mg, Oral, Nightly, Edmond Izquierdo MD    metoprolol succinate XL (TOPROL-XL) 24 hr tablet 25 mg, 25 mg, Oral, Daily, Edmond Izquierdo MD    nitroglycerin (NITROSTAT) SL tablet 0.4 mg, 0.4 mg, Sublingual, Q5 Min PRN, Edmond Izquierdo  "MD Claudio    ondansetron ODT (ZOFRAN-ODT) disintegrating tablet 4 mg, 4 mg, Oral, Q6H PRN **OR** ondansetron (ZOFRAN) injection 4 mg, 4 mg, Intravenous, Q6H PRN, Edmond Izquierdo MD    pantoprazole (PROTONIX) EC tablet 40 mg, 40 mg, Oral, Daily, Edmond Izquierdo MD    polyethylene glycol (MIRALAX) packet 17 g, 17 g, Oral, Daily PRN, Edmond Izquierdo MD    predniSONE (DELTASONE) tablet 20 mg, 20 mg, Oral, Daily, Gareth Becerra MD    sodium bicarbonate tablet 650 mg, 650 mg, Oral, TID, Edmond Izquierdo MD    sodium chloride 0.9 % bolus 250 mL, 250 mL, Intravenous, PRN, Edmond Izquierdo MD    sodium chloride nasal spray 2 spray, 2 spray, Each Nare, 5x Daily, Edmond Izquierdo MD    sodium chloride nasal spray 2 spray, 2 spray, Each Nare, Continuous PRN, Edmond Izquierdo MD    tamsulosin (FLOMAX) 24 hr capsule 0.4 mg, 0.4 mg, Oral, Nightly, Edmond Izquierdo MD    traZODone (DESYREL) tablet 25 mg, 25 mg, Oral, Nightly PRN, Edmond Izquierdo MD    zolpidem (AMBIEN) tablet 2.5 mg, 2.5 mg, Oral, Nightly PRN, Edmond Izquierdo MD    Data:     Code Status:    There are no questions and answers to display.       Family History   Problem Relation Age of Onset    Hypertension Mother     Leukemia Father        Social History     Socioeconomic History    Marital status: Single   Tobacco Use    Smoking status: Former     Current packs/day: 0.00     Types: Cigarettes     Start date: 10/29/1954     Quit date: 10/29/2003     Years since quittin.9    Smokeless tobacco: Former     Types: Chew     Quit date:    Vaping Use    Vaping status: Never Used   Substance and Sexual Activity    Alcohol use: No    Drug use: No    Sexual activity: Defer       Vitals:  Ht 162.6 cm (64\")   Wt 74.1 kg (163 lb 6.4 oz)   BMI 28.05 kg/m²   T 97.6 P 73 R 24 2 /75 Sp02 97% (4 lpm)          I/O (24Hr):  No intake or output data in the 24 hours ending 10/17/24 1153    Labs and " imaging:      Recent Results (from the past 12 hours)   CBC (No Diff)    Collection Time: 10/17/24  5:11 AM    Specimen: Blood   Result Value Ref Range    WBC 9.05 3.40 - 10.80 10*3/mm3    RBC 4.19 4.14 - 5.80 10*6/mm3    Hemoglobin 11.9 (L) 13.0 - 17.7 g/dL    Hematocrit 40.2 37.5 - 51.0 %    MCV 95.9 79.0 - 97.0 fL    MCH 28.4 26.6 - 33.0 pg    MCHC 29.6 (L) 31.5 - 35.7 g/dL    RDW 19.1 (H) 12.3 - 15.4 %    RDW-SD 67.5 (H) 37.0 - 54.0 fl    MPV 12.4 (H) 6.0 - 12.0 fL    Platelets 157 140 - 450 10*3/mm3   Basic Metabolic Panel    Collection Time: 10/17/24  6:41 AM    Specimen: Blood   Result Value Ref Range    Glucose 94 65 - 99 mg/dL    BUN 35 (H) 8 - 23 mg/dL    Creatinine 1.55 (H) 0.76 - 1.27 mg/dL    Sodium 140 136 - 145 mmol/L    Potassium 4.7 3.5 - 5.2 mmol/L    Chloride 105 98 - 107 mmol/L    CO2 27.0 22.0 - 29.0 mmol/L    Calcium 9.6 8.6 - 10.5 mg/dL    BUN/Creatinine Ratio 22.6 7.0 - 25.0    Anion Gap 8.0 5.0 - 15.0 mmol/L    eGFR 44.4 (L) >60.0 mL/min/1.73                                   Physical Examination:        Physical Exam  Vitals and nursing note reviewed.   Constitutional:       Appearance: He is well-developed.   HENT:      Head: Normocephalic and atraumatic.      Nose: Nose normal.      Mouth/Throat:      Mouth: Mucous membranes are moist.      Pharynx: Oropharynx is clear.   Eyes:      Pupils: Pupils are equal, round, and reactive to light.      Comments: Left eye enucleated   Cardiovascular:      Rate and Rhythm: Normal rate and regular rhythm.      Heart sounds: Normal heart sounds.   Pulmonary:      Effort: Pulmonary effort is normal.      Breath sounds: Normal breath sounds.   Abdominal:      General: Bowel sounds are normal.      Palpations: Abdomen is soft.   Musculoskeletal:         General: Normal range of motion.      Cervical back: Normal range of motion and neck supple.      Comments: Generalized weakness   Skin:     General: Skin is warm and dry.   Neurological:      Mental  Status: He is alert and oriented to person, place, and time.      Deep Tendon Reflexes: Reflexes are normal and symmetric.   Psychiatric:         Behavior: Behavior normal.           Assessment:             Stage 3b chronic kidney disease    A-fib    Former smoker    COPD (chronic obstructive pulmonary disease)    History of radiation therapy    Chronic heart failure with preserved ejection fraction (HFpEF)    Atrial flutter    COVID-19 virus infection    Acute-on-chronic respiratory failure    Epistaxis    Chronic rhinitis    History of pneumonia    Supplemental oxygen dependent    Past Medical History:   Diagnosis Date    Arthritis     Atrial fibrillation with rapid ventricular response 05/31/2019    Blindness of left eye     BPH with obstruction/lower urinary tract symptoms     Cancer     stomach & lung    CHF (congestive heart failure)     CKD (chronic kidney disease) stage 3, GFR 30-59 ml/min     COPD with acute exacerbation 08/03/2024    Coronary artery disease     Elevated cholesterol     Essential hypertension 10/02/2017    Fibrotic lung diseases     GERD (gastroesophageal reflux disease)     Hearing loss     History of transfusion     Hydronephrosis of left kidney     Hyperlipidemia     Hypertension     pt was taken off of all of his medications for BP (atenolol, lisinopril, lasix) because his BP kept bottoming out so his primary dr told him to discontinue them 1-2 months ago (Jan/Feb 2019). pt states he takes no medications currently.    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 Patel is just going to keep an eye on it and not do surgery right now.    Mediastinal adenopathy 10/24/2018    [facility]    Monoclonal gammopathy of unknown significance (MGUS) 09/11/2018    Pancreatic mass     pt states he had this in 2013 but it went away on its own. Now  recent CT shows it has come back so he is going to have an ultrasound on 3/13/19.    Shortness of breath         Plan:        Acute on chronic hypoxic respiratory failure  Stage 4 metastatic non small cell lung cancer  COPD  Interstitial lung disease  Chronic diastolic CHF  CKD3  Hyperkalemia  Multifactorial anemia  Anxiety  Hemorrhoids  Constipation    Continue current treatment. Monitor counts. Increase activity. Labs Monday. Continue oxygen weaning as tolerated under direction of pulmonology. Aggressive therapies as tolerated. Maintain patient safety. Adjust bowel regimen as needed.       Electronically signed by MARITZA Greenwood on 10/17/2024 at 11:53 CDT

## 2024-10-17 NOTE — CONSULTS
MEDICAL ONCOLOGY CONSULTATION    Pt Name: Karoline Prater  MRN: [id number]  [id number]  YOB: 1940  Date of evaluation: 10/17/2024    Reason for Consultation: History of lung cancer, continuity of care, concern for progression    Requesting Physician: Dr. 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   CKD and chemotherapy associated ANEMIA, previously on Procrit, currently on hold having completed chemotherapy with further improvement in hemoglobin.  Pancreatic mass diagnosed 10/29/03 negative on open biopsy  BPH on Flomax  Orthostatic hypotension medications managed by Dr. Reuben Vásquez completed 4 cycles of combination chemotherapy with carboplatin, Keytruda, Alimta on 7/5/2019.    Maintenance Keytruda and Alimta every 3 weeks was delivered.    The final cycle number #25 of Keytruda/Alimta was delivered on 10/29/2020.     Treatment was held since that time due to issues of dyspnea and shortness of breath, requiring steroids.      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      Mr. Prater has had several hospitalizations and exacerbations of COPD and complications with pulmonary fibrosis in addition to the question of possible progressive disease including possible lymphangitic spread.      He is currently, 10/17/2024, on LTAC  I have been asked to see him for comment and opinion regarding recent imaging findings.  Below are findings and interactions I have had and monitoring Mr. Prater's history of lung cancer.    I discussed imaging with Dr Cool on 7/3/2024  "regarding the June 2024 imaging.  Dr. Cool was gracious enough to review imaging studies with the following assessment by Dr. Danny Cool:  \"I have reviewed the CT scan of the thorax on this admission for atrial fibrillation with rapid ventricular response.     He completed recent SBRT radiotherapy for 2 right lung nodules on February 7, 2024.     Review of the current CT scan appears consistent with postradiation change.  We cannot entirely rule out progression however this appears unlikely with the chronology and radiographic appearance.     He is scheduled to return to our department in approximately 1 month with follow-up CT scan of the thorax and we will follow this closely with serial CT scans of the thorax.\"        CT scan of the chest without contrast on 9/21/2024 with addendum report is as follows:  Spiculated masslike opacity of the right upper lobe, similar to only minimally decreased compared to 8/3/2024, increased compared to 6/29/2024. Neoplastic process must be considered.   Stable scarring suspected of the right lung apex. Nonspecific irregular 2 x 1 cm nodular focus right middle lobe for which continued imaging surveillance recommended.   Advanced chronic interstitial lung disease with bronchiectasis.   No pneumothorax. Diffusely irregular right pleural thickening remains stable.     This report was signed and finalized on 9/21/2024 1:13 PM by Dr. Betsey Wells MD.    ADDENDUM: Request from Dr. Michele's office for additional comparison  with the earlier imaging examinations, comparison is made with PET/CT  8/20/2024, CT chest without contrast 8/3/2024, CT chest without contrast  6/29/2024, CT chest without contrast 5/6/2024.     Measurements of the spiculated mass in the right upper lobe:     CT chest without contrast 5/6/2024: Approximately 4.0 x 2.2 cm     CT chest without contrast 6/29/202, Approximately 4.7 x 2.1 cm     CT chest without contrast 8/3/2024: Approximately 4.9 x 4.2 cm   "   PET/CT 8/20/2024: Approximately 3.6 x 1.8 cm.     CT chest without contrast 9/21/2024: Approximately 4.2 x 3.6 cm.     The mass was slowly increasing in size from May, 2024 through August 3,  2024, then it was smaller on the PET/CT from 8/20/2024, which could be a  positive treatment response. The mass then increased in size on  9/21/2024 and that measurement includes more confluent soft tissue  attenuation against the lateral pleural surface, which could be due to  increased volume of tumor tissue, or confluent surrounding infiltrate or  progressive fibrosis. A repeat PET/CT could help determine if the  increased size and volume of the mass is related to avid tumor or due to  an increase in surrounding infiltrate and fibrosis.     This report was signed and finalized on 10/7/2024 3:44 PM by Dr. Allen Churchill MD.          CT[facility] chest at [facility] on 10/3/2024 reported the following:  Similar masslike spiculated opacity at the RIGHT upper lobe. Similar interlobular septal thickening with increased patchy opacities especially in the RIGHT lung. Appearance highly concerning for malignancy with lymphangitic spread of tumor.   Similar mild adenopathy.  Coronary artery calcifications with metallic device in the RIGHT  ventricular apex, favor wireless pulse generator.  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          I agree with the radiologist's interpretation that the actual tumor mass within the radiation therapy field is similar to previous imaging.  I also agree that there are parenchymal interstitial changes that come and go, sometimes are worse sometimes are diminished with treatment.    In discussion with Dr. Gordon, Mr. Prater appears to be improving since on LTAC anticipating likely improvement in the imaging of the interstitial lung disease problems.    See recommendations below.                    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     Fahad has benign prostatic hypertrophy (BPH)  that is stable on Flomax.   Orthostatic hypotension resolved with adjustment of metoprolol by Dr. Alexis   He takes Cozaar, metoprolol, Lasix and spironolactone without new cardiac issues.    Protonix continues without any new exacerbations of GERD.    Lower extremity edema overall has actually improved to some degree        TUMOR HISTORY: Adenocarcinoma on the RUL of the lung 11/27/2018  Karoline had CT scans performed for new onset of weight loss of 13 pounds over the previous year , associated with increased fatigue.   He denied respiratory symptoms of shortness of air, cough or productive sputum.     A CT scan of the chest on 9/12/2018 had been ordered by Dr. Irving Alexis due to weight loss and identified a new lobulated right upper lobe 5.7 cm mass that extended back to the right hilum.   Numerous mediastinal lymph nodes ranging in size from mm to 1.3 cm and a subcarnial lymph node that measured 1.5 x 1.5 x 3.5 cm.     A CT scan of the abdomen and pelvis with contrast on 9/12/2018 documented a 4.9 x 4 x 4 cm soft tissue mass with peripheral calcification immediately adjacent to the gallbladder in the head of the pancreas area.     An MRI of the abdomen and pelvis W & WO on 10/11/2018 identified in the 4.1 x 3.4 cm soft tissue mass in the subhepatic space, abutting the second portion of the duodenum with mild displacement of the duodenum. There does not appear to be involvement of the pancreas, and the pancreas appears within normal limits.  Differential diagnoses include a desmoid tumor, less likely leiomyoma of the wall of the duodenum or malignancy.   Dr. Michele requested a CT-guided biopsy of the right upper lobe mass.   Dr. Rosales, the radiologist, evaluated the area with a repeat CT scan of  the chest on 10/11/2018.   He spoke with Dr. Michele indicating that he would not be able to perform the biopsy because the tumor was not accessible, it was under the scapula , which blocked access and therefore the biopsy procedure was canceled.     On the CT scan of the chest on 10/11/2018 measurements of the RUL lung mass was 5.7 x 3.2 cm with irregular margins suspicious for a primary lung neoplasm. Soft tissue associated with the tumor appeared to extend into the bronchus supplying this portion of the RUL of the lung.   Dr. Rosales indicated that the mass had an endobronchial component and should be easily assessable via bronchoscopy.     The chest CT also included a portion of the upper abdomen where a soft tissue mass was described in an addendum report. In the subhepatic space measuring 4.0 x 3.9 cm, displacing the second portion of the duodenum medially.  A referral was made to Dr. Becerra for consideration for bronchoscopy for biopsy.     On 10/24/2018, Dr. Gareth Becerra performed a bronchoscopy with EBUS guided biopsy of a 3 cm subcarinal lymph node along with bronchoalveolar lavage of the posterior segment of the RUL of the lung.  The final pathology of the station 7 lymph node only revealed a background of small mature lymphocytes with clusters of histiocytes suggestive of granuloma.  Negative for malignant cells.  Kinyoun and GMS stains were negative for AFB and fungal organisms respectively.  The bronchial washings were negative for malignant cells as well.      Mr. Prater was referred to Dr. Meenakshi Polanco at [facility] in [place], [place], for navigational bronchoscopy and biopsy under ultrasound.     On 11/27/2018 Dr. Meenakshi Polanco performed a bronchoscopy, navigational protocol, endobronchial ultrasound and biopsy of the RUL of the lung mass along with multiple lymph node station Biopsies.   Pathology revealed the following:  Poorly differentiated Adenocarcinoma from the RUL of the  lung mass on biopsy and bronchoalveolar lavage consistent with a lung primary.   All lymph nodes sampled were NEGATIVE for malignant cells including stations 10L, 4L, station 7, 10R, 4R.   IHC studies were positive for CK7, TTF-1 and Napsin A.   IHC studies on tumor cells were negative for CDX2, CK5/6, and p63   Further lung profile molecular testing is pending     All of the above was discussed at length with Mr. Prater at his 12/13/2018 clinic visit.   He was agreeable for referral for consideration of resection of the lung lesion.      He is comfortable with and would like a referral to Dr. Ulysses Bardales ([phone]) at [facility], [address], [place] [address].  Logistics, however, are planning a big part in his decision making and he may decide to have surgery done in the [place] area.     If he decides to have surgery in the [place], referral will be made to Dr. Frandy Patel.      CT chest with contrast 2/18/2019 at [facility] compared to 9/12/2018 revealed a 4.9 x 3.5 cm spiculated RUL lung mass which actually decreased in size from a prior value of 6.1 x 3.6 cm.   Subhepatic mass adjacent to the descending duodenum had increased in size significantly to 4.3 x 9 cm compared to 4.1 x 3.4 cm on the 10/11/18 MRI of the abdomen.     CT of the abdomen and pelvis without contrast on 3/20/2019 at [facility] was compared to outpatient CT data and pelvis on 9/12/2018 an MRI of the abdomen from 10/11/2018. A soft tissue mass was again encountered in the subhepatic space which abutted the distal stomach and proximal duodenum measuring 8.9 x 5.4 cm which has increased considerably from a prior measurement of 4.1 x 3.4 cm. Medial to the left renal hilum a 3.5 cm lobular mass causing some mild left-sided hydronephrosis.     Mr. Prater was referred to Dr. Frandy Patel who saw him on 1/22/2019 anticipating plans for a curative resection.      Dr. Michele received a phone call on 2/20/2019 from  Dr. Patel , indicating that the previously documented an abdominal pancreatic area mass had doubled in size and was causing compression into the left kidney/renal pelvis producing a hydroureter.      Dr. Patel arranged a referral to Dr. Sylvester who will be placing a stent 3/8/2019.     Mr. Prater was scheduled to be seen by gastroenterology at [facility] on 3/13/2019 for EGD/EUS assessment and biopsy of the enlarging peripancreatic/duodenal area mass.  With a decrease in the lung mass and increased size of the subhepatic mass-surgical resection was abandoned at present pending further evaluation of the subhepatic mass.  Dr. Michele discussed treatment options with the unresectable lung mass and the per he pancreatic mass and recommended a combination of Keytruda, Alimta, carboplatin.  He was subsequently admitted to [facility] 4/26 - 4/30/2019 with abdominal pain and melena. WBC pinky was 0.75 with ANC 0.34. PLT did drop to 24,000. He did have duodenal ulcerations that were bleeding on endoscopy. He was stabilized but then readmitted from 5/8 - 5/10/2019 with recurrent melanoma. A repeat endoscopy was performed. It was felt that exacerbation of his bleeding from the duodenal came about by his thrombocytopenia from treatment. Subsequent dosing was adjusted and Neulasta was added.     CT chest without contrast at [facility] on 6/27/2019 was compared to 2/18/2019 revealed that the right lung mass that extended into the right hilum has decreased from 5.2 x 3.5 down to 4.6 x 3.6. There is increased central cavitation in the mass consistent with tumor necrosis. Mediastinal lymphadenopathy is relatively stable.     CT abdomen and pelvis without contrast at [facility] on 6/27/2019 was compared to 4/26/2019 revealed complete resolution of the previously identified duodenal/subhepatic space soft tissue mass. The previously identified heterogenous enhancing lesion in the left renal pelvis was much less prominent but there does continue to be  some mild left caliectasis.     I reviewed the CT results with Dr. Michele on 7/5/2019 who then relayed them as well to Karoline. We've had excellent results from this combination of therapy.     He completed the fourth combination therapy of Keytruda, carbo, Alimta on 7/5/2019 and then transitioned to maintenance Keytruda plus Alimta.     CT chest without contrast at [facility] on 1/9/2020 was compared to 10/17/2019 revealing:   A stable spiculated RUL nodule/mass with similar mediastinal adenopathy.    Questionable right hilar adenopathy with diminished assessment due to lack of IV contrast.    Advanced emphysema with severe pulmonary fibrosis.    No new pulmonary nodules.     CT abdomen and pelvis without contrast at [facility] on 1/9/2020 was compared to 10/17/2019 revealing:   Mildly prominent aortocaval RP lymph nodes with position just below the level of the renal vein worrisome for potential lymphatic metastases.  This is similar to 10/17/2019 but increased from 4/26/2019.    Pneumobilia without discrete suspicious focal lesion in the liver.    Wall thickening of the duodenum.    Similar and stable renal lesions favoring cysts.     He has had numerous endoscopies within the past year.      While he was having an Endo EUS and had cardiac issues and was actually admitted to the intensive care unit.       CT abdomen and pelvis without contrast on 7/16/2020 at [facility] was compared to 1/9/2020 and documented:  1.5 x 0.9 cm aortocaval lymph node, previously 1.9 x 1.2 cm  No new abnormality seen     CT chest without contrast on 11/12/2020 at [facility] ws compared to 1/9/2020 and documented:  Mixed response therapy with interval decrease in size in the RIGHT upper lobe pulmonary nodule but increase in size and mediastinal lymph nodes.  In addition, there is severe emphysema with findings of severe pulmonary fibrosis. Interval worsening of interstitial opacities bilaterally as well as suggestion of some pleural nodularity. Lymphangitic  spread of malignancy should be considered.  Small pleural effusion on the RIGHT is new     CT abdomen and pelvis without contrast on 11/12/2020 at [facility] was compared to 7/16/2020 and documented:  The aortic caval node described on the comparison study is not significantly changed in size when measured at the same location.  Nonobstructing renal calculus on the LEFT. No change in the indeterminate renal lesions on the LEFT, likely cysts.  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     Fahad has benign prostatic hypertrophy (BPH)  that is stable on Flomax.   Orthostatic hypotension resolved with adjustment of metoprolol by Dr. Alexis   He takes Tambocor, metoprolol without new cardiac issues.    Protonix continues without any new exacerbations of GERD.    Lower extremity edema overall has actually improved to some degree.     Fahad received his final cycle #25 of Keytruda/Alimta on 10/29/2020.    Treatment was held since that time due to issues of dyspnea and shortness of breath.     A course of steroids (prednisone) was initiated and antibiotics also delivered and although his respiratory issues improved, he has continued to have issues with weight loss.     CXR on 12/17/2020 documented no interval change, but in my personal review of the x-ray, there is significant pulmonary fibrosis not significantly changed compared to the x-ray from October 2020.     I discussed the findings on the chest x-ray and the comparison at his clinic visit on 12/30/2020.  He does not appear to be improving but rather quite stable.  Vianey has pulmonary fibrosis as documented on a CT scan of the chest without contrast at [facility] on 11/12/2020.  Fahad is in agreement to go  on a chemotherapy holiday with a repeat CT scan of the chest in 3 months and monitor his situation clinically and radiographically without systemic intervention going forward.     CT CHEST WO  contrast on 3/17/2021, compared to 11/12/2020 at [facility] documented:   Mixed response therapy with interval decrease in size in the RIGHT upper lobe pulmonary nodule but increase in size and mediastinal lymph nodes.  Slight interval decrease in size in the RIGHT upper lobe nodule/mass measuring 4.7 x 2.2 cm today, previously 5.0 x 2.4 cm. RIGHT apical nodular density measuring up to 0.6 cm, previously 0.4 cm.  In addition, there is severe emphysema with findings of severe pulmonary fibrosis. Interval worsening of interstitial opacities bilaterally as well as suggestion of some pleural nodularity. Lymphangitic spread of malignancy should be considered.  Small pleural effusion on the RIGHT is new.     CT ABD & PELVIS  WO contrast on 3/17/2021, compared to 11/12/2020,  at [facility] documented:  Nonobstructing calculus lower pole left kidney  Low-density lesions associated with the left renal contour probably proteinaceous cyst these are unchanged  Chronic right lateral pleural complex in the lung base with bilateral small basilar pneumothoraces..      XR CHEST (2 VW) 4/21/2021 at [facility] , compared to December 17, 2020, documented:  Advanced interstitial fibrotic changes noted throughout the pulmonary parenchyma unchanged from December 17, 2020.  Small to moderate right lateral pleural complex. This may be pleural fluid or thickening is unchanged December 17, 2020.     XR CHEST (2 VW)  6/16/2021:  2.5 cm area of residual nodularity versus scarring in the RUL zone.  Probable small pleural effusions.  Bilateral interstitial infiltrates stable compared to the 2 most recent studies but increased compared to the 2019 study.   There may beunderlying pulmonary fibrosis that is worsening.   Pulmonary edema is not ruled out.       CT CHEST WO CONTRAST  DIAGNOSTIC at [facility]- 11/15/2021:Comparison: CT chest without contrast 8/25/2021  3.0 x 2.2 cm spiculated mass in the right upper lobe, stable  There is pleural thickening lateral to the mass with bands of probable fibrosis, stable   4 mm RUL there is a small stellate shaped nodule, previously 5 mm  3 mm nodule in the right lower lobe posterior to the major fissure, unchanged  There is a calcified granuloma in the medial basal segment left lower lobe   Small calcified pleural plaques in the upper left hemithorax as before.  There are several partially calcified mediastinal lymph nodes which appears stable  There is increased pleural thickening along the anterior margin of the right lower lobe.         CT ABDOMEN PELVIS WO CONTRAST at [facility]- 11/15/2021:Comparison: CT abdomen pelvis without contrast 3/17/2021  Review of lung windows demonstrates extensive reticular interstitial fibrosis in the lower lung zones, as well as loculated pleural fluid in the right lateral lung base with pleural thickening   10 mm.hyperattenuating exophytic nodule at the inferior pole the left kidney   7 mm nephrolithiasis at the inferior pole the left kidney.  The prostate gland is enlarged  There is grade 1 spondylolisthesis at L5-S1 with disc space collapse. This is stable     XR CHEST (2 VW)  on 3/3/2022 at [facility], compared to 8/16/21, documented:  A 1.7 cm opacity in the right midlung zone appears smaller on the current study, previously measuring 2.5 cm.  Stable blunting of the costophrenic angles right greater than left.  Coarse interstitial lung disease appears relatively stable. Probable interstitial fibrosis     X-RAYS OF THE CHEST at [facility] on 6/29/2022:  The lungs show moderately severe fibrosis and COPD with pleural  reaction blunting the right costophrenic sulcus  Small area consolidation noted right upper lobe        X-RAYS OF THE CHEST at [facility] on 9/28/2022:  The right-sided MediPort tip overlying the projection of the superior vena  cava  The aorta is calcified and tortuous  The heart is enlarged in size.    There are diffuse interstitial opacities throughout the parenchyma bilaterally with a small right effusion.    There is a nodular density in the right upper lung field.     The RUL abnormality on the chest x-ray from today is likely residual scarring noted on the previous CT scan from November 2021.     CT chest without contrast  at [facility] on 11/16/2022:COMPARISON: 11/15/2021, 8/25/2021  9 mm RIGHT upper lobe pulmonary nodule, previously 4 mm  3.0 x 1.9 cm spiculated mass in the RIGHT upper lobe abutting the major minor fissures, unchanged  Chronic interstitial thickening, bronchiectasis, and interstitial fibrosis is similar to multiple prior studies.   Small RIGHT pleural effusion.   No change in a few borderline prominent partially calcified mediastinal lymph nodes        CT ABDOMEN PELVIS WO CONTRAST at [facility] on 11/16/2022:COMPARISON: 11/15/2021   There is mild cardiomegaly with mitral annulus calcifications and coronary artery calcification  Trace pericardial effusion or pericardial thickening is present.  There is interstitial fibrosis within the lung bases with honeycombing.   There is a loculated effusion within the right lateral costophrenic angle stable from the previous exam  There are cysts involving the left kidney.   More complex exophytic lesions involving the posterior midpole and the left lower pole may represent hemorrhagic cysts.  Anterolisthesis of L4 on L5 and L5 on S1 is present suggesting subluxation at the level of the facets at L4-L5  The prostate gland is mildly enlarged        CT CHEST WO on 11/9/2023 at [facility], compared to 11/16/22  multiple mediastinal lymph nodes, most of which are partially calcified which appear similar in size and number since the prior study.  The largest measure up to 1.3 cm short axis subcarinal region and 1.2 cm short axis AP window  2.6 x 1.9 cm anterior right apical nodular consolidation,   significantly increased in size, previously 0.9 cm.  3.6 x 1.9 cm Spiculated nodule right upper lobe, unchanged in size  1.2 x 1.1 cm additional perifissural opacity along the anterior minor fissure   Emphysema, bronchial thickening and peripheral reticular opacities present throughout both lungs.  Stable emphysema.  Trace amount of pleural fluid lateral right lung base  Degenerative changes present in the spine.  No discrete osseous lesion detected     CT ABDOMEN PELVIS WO on 11/9/23 at [facility], compared to 1/5/23  Pneumobilia, increased from prior.  This may be seen following sphincterotomy or other hepatobiliary procedures.  Recommend correlation with operative history and hepatic enzyme levels.  No portal venous gas or bowel pneumatosis  Nonobstructing let nephrolithiasis and unchanged left renal cysts  Moderate quantity of stool throughout the colon     PET CT SKULL BASE TO MID THIGH on 12/13/23, compared to CT 11/08/23  2.6 x 1.9 cm anterior right apical nodular consolidation, SUV 14.8, consistent with malignancy until proven otherwise  3.6 x 1.9 cm RUL spiculated nodule, SUV 5.34, consistent with increased likelihood of malignancy  No additional significantly FDG avid nodules are seen within the lung fields   Right hilar lymph node SUV 3.90  Right suprahilar lymph node SUV 4.26  Enlarged subcarinal node SUV 2.58  Nonenlarged precarinal node SUV 2.26  AP window node SUV 2.82  Additional smaller/less FDG avid nodes not individually described  In the head and neck, the included portions of the orbits and paranasal sinuses demonstrate normal levels of activity   The examination demonstrates a generally physiologic distribution of activity in the abdomen and pelvis   The examination demonstrates a generally physiologic distribution of activity in the included portions of the skeleton and extremeties         Fahad continues to have chronic dyspnea on exertion associated with pulmonary fibrosis from smoking history as  well as drilling and blasting rock at the [employer], but still at baseline without exacerbations.     Imaging studies with CT scan of the chest on 11/9/2023 and a follow-up PET scan on 12/7/2023 documented progressive disease in the RUL of the lung.  He was referred to Dr. Danny Cool at [facility] for SBRT.        SBRT by Dr. Danny Cool was delivered in 5 treatment fractions for a total dose of 5000 cGy.  XRT was initiated 1/24/2024 and was completed on 2/7/2024     CT CHEST WO CONTRAST AT [facility] on 3/20/2024: Compared to 11/08/2023  1.3 cm partially calcified subcarinal lymph node, unchanged.   2.0 x 1.5 x 1.9 cm right upper lobe mass, previously 2.0 x 2.5 x 1.9 cm   1.8 x 3.0 x 2.0 right upper lobe mass, previously 3.5 x 1.9 x 1.6 cm   Emphysematous changes with subpleural reticulations and possible honeycombing and fibrosis are noted with pleural thickening again observed along the right lateral chest wall extending to the diaphragmatic surface.   There are scattered subpleural calcifications      CT ABDOMEN PELVIS WO CONTRAST AT [facility] on 3/20/2024: compared to 11/08/2023  No evidence of metastasis in the abdomen/pelvis.   Multiple left renal cysts. Nonobstructing 4 mm stone at the left lower pole   Mild colonic diverticulosis   No aggressive osseous lesion identified   Multilevel degenerative spondylosis and mild dextroconvex scoliosis.  Grade 1 anterolisthesis at L4-L5 and L5-S1.  Right unilateral L5 pars defect.       CT scan of the chest without contrast at [facility] on 6/29/2024 was compared to 5/6/2024 reported the following  1. A spiculated mass in the right upper lobe laterally is larger on the   current study.   2. There is consolidation around a previously described nodule in the   right lung apex likely related to posttreatment change.   3. Diffuse reticulonodular fibrotic changes throughout both lungs.   Emphysema.   4. Pleural thickening on the right that is more focal in the right lung   base laterally.  "Metastatic pleural disease is considered.   5. Ectasia of the ascending thoracic aorta measuring 4.3 cm. Main   pulmonary artery segment is dilated measuring 3.6 cm. Mild cardiomegaly. ...      CT scan of the abdomen pelvis at [facility] on 2024 was compared to 2023 and reported the followin. There is a mildly dilated proximal small bowel loop measuring 4 cm.  No other small bowel distention is seen. This could be transient or due  to partial obstruction. Gastric wall thickening likely related to under  distention. There is under distention of portions of the colon and  moderate stool in the colon.  2. 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  Fibrosis in the lung bases with emphysema.  3. Left renal cyst. A 6 mm nonobstructive renal stone lower pole on the  left.  4. Mildly enlarged prostate measuring 4.7 cm.  5. Air within the biliary tree likely related to prior sphincterotomy.  Prior cholecystectomy.  6. Coarsening of the trabecula in the right femoral neck and  trochanteric region could be related to early Paget's disease. There is  no cortical thickening. A lipo sclerosing myxofibrous tumor is felt to  be less likely as there is no sclerotic margin.     Dr Michele discussed with dr Cool scans findings  Dr. Cool was gracious enough to review imaging studies with the following assessment by Dr. Danny Cool:  \"I have reviewed the CT scan of the thorax on this admission for atrial fibrillation with rapid ventricular response.     He completed recent SBRT radiotherapy for 2 right lung nodules on 2024.     Review of the current CT scan appears consistent with postradiation change.  We cannot entirely rule out progression however this appears unlikely with the chronology and radiographic appearance.     He is scheduled to return to our department in approximately 1 month with follow-up CT scan of the thorax and we will follow this closely with serial CT scans of " "the thorax.\"                   TREATMENT SUMMARY  Keytruda, carboplatin, Alimta initiated 4/18/2019 to be given every 3 weeks for 4 cycles - 4th cycle 7/5/2019, then Keytruda + Alimta as maintenance to continue indefinitely until progression or intolerable side effects   Fahad received cycle #25 of Keytruda/Alimta on 10/29/2020.  He was then placed on an indefinite chemotherapy holiday on 12/30/2020  SBRT by Dr. Danny Cool was delivered in 5 treatment fractions for a total dose of 5000 cGy.  XRT was initiated 1/24/2024 and was completed on 2/7/2024                      #2 TUMOR HISTORY: Pancreatic mass diagnosed 10/29/03  Mr. Prater presented in October 2003 with obstructive jaundice.   A CT scan of the abdomen on 10/26/2003 documented a 3.2 x 4 x 4.2 cm mass in the head of the pancreas.      On 10/29/03 Dr. Alexis performed a resection of a portion of the head of the pancreas on Fahad Prater along with a Klaudia-en-Y procedure and a choledochojejunostomy for what was felt to be a pancreatic cancer. His pancreas was bypassed because of the findings.  Pathology of the biopsies were negative for malignancy showing a fibrosed pancreas.   Mr. Prater was referred to Dr. Vishal High in [place].     Dr. Vishal High performed ERCP with an EUS and biopsy on 02/26/04   Pathology again was negative for cancer or malignancy.   Routine periodic serologic and radiographic monitoring has not disclosed progression of the mass or development of new malignancy or increase in pancreatic tumor markers.      A CT scan of the abdomen and pelvis with contrast on 9/12/2018 documented a 4.9 x 4 x 4 cm soft tissue mass with peripheral calcification immediately adjacent to the gallbladder in the head of the pancreas area.     An MRI of the abdomen and pelvis W & WO on 10/11/2018 identified in the 4.1 x 3.4 cm soft tissue mass in the subhepatic space, abutting the second portion of the duodenum with mild displacement of the duodenum. There does " not appear to be involvement of the pancreas, and the pancreas appears within normal limits.  Differential diagnoses include a desmoid tumor, less likely leiomyoma of the wall of the duodenum or malignancy.     CT of the abdomen and pelvis without contrast on 3/20/2019 at [facility] was compared to outpatient CT data and pelvis on 9/12/2018 an MRI of the abdomen from 10/11/2018. A soft tissue mass was again encountered in the subhepatic space which abutted the distal stomach and proximal duodenum measuring 8.9 x 5.4 cm which has increased considerably from a prior measurement of 4.1 x 3.4 cm. Medial to the left renal hilum a 3.5 cm lobular mass causing some mild left-sided hydronephrosis.     Mr. Prater was scheduled for an EGD/EUS by Dr. Bryson Moe as an outpatient procedure on 3/13/2019 for assessment and biopsy of the enlarging peripancreatic/duodenal area mass. Unfortunately, after initiation of the procedure, he began having ventricular tachycardia and the procedure had to be aborted. Karoline was transferred to the ICU for cardiology evaluation and stabilization. He remained hospitalized until 3/18/2019 when he was medically cleared for discharge.     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 No emergent urologic intervention was warranted and he received monitoring of renal function. He had a creatinine level of 1.9 at discharge.     PET scan on 4/2/2019 identified a soft tissue mass in the RUL measuring 1.2 x 3.5 cm with extension to the right anterior hilum with an SUV of 10.1. Evidence of mediastinal and hilar lymphadenopathy, with low-level metabolic activity. Interval increase in the size of a large mass in the right upper abdomen inseparable from the duodenum measuring 10.7 x 6.9 cm compared to 5.4 x 8.9 cm on 2/20/2019 with an SUV of 23.6. Slight increase in 2 small inseparable masses medial to  the hilum of the left kidney measuring 2.2 and 2.6 cm and the other measuring 3.9 cm with a maximum SUV of 18.6.     Cycle #1 of palliative chemotherapy with Carboplatin, Alimta and Keytruda was initiated 4/18/19 which should also cover this process.     Abdominal/pelvic CT 4/26/19 was performed at [facility] due to abdominal pain and melena. The RUQ mass, within the duodenum decreased to 6.8 x 6.2 cm (was 10.7 x 6.9 cm on PET 4/2/19)     CT abdomen and pelvis without contrast at [facility] on 6/27/2019 was compared to 4/26/2019 revealed complete resolution of the previously identified. Duodenal/subhepatic space soft tissue mass. The previously identified heterogenous enhancing lesion in the left renal pelvis was much less prominent but there does continue to be some mild left caliectasis.        CT abdomen and pelvis without contrast at [facility] on 1/9/2020 was compared to 10/17/2019 revealing:   Mildly prominent aortocaval RP lymph nodes with position just below the level of the renal vein worrisome for potential lymphatic metastases.  This is similar to 10/17/2019 but increased from 4/26/2019.    Pneumobilia without discrete suspicious focal lesion in the liver.    Wall thickening of the duodenum.    Similar and stable renal lesions favoring cysts.     CT ABD & PELVIS  WO contrast on 3/17/2021, compared to 11/12/2020,  at [facility] documented:  Nonobstructing calculus lower pole left kidney  Low-density lesions associated with the left renal contour probably proteinaceous cyst these are unchanged  Chronic right lateral pleural complex in the lung base with bilateral small basilar pneumothoraces..      He has had numerous endoscopies within the past year.  When he was having an Endo EUS he had cardiac issues and required to the intensive care unit.       This area will just be monitored on follow-up scans without further elective endoscopic surveillance..     Mr. Prater requests that imaging studies only be done yearly.      CT ABDOMEN  PELVIS WO CONTRAST at [facility]- 11/15/2021:Comparison: CT abdomen pelvis without contrast 3/17/2021  Review of lung windows demonstrates extensive reticular interstitial fibrosis in the lower lung zones, as well as loculated pleural fluid in the right lateral lung base with pleural thickening   10 mm.hyperattenuating exophytic nodule at the inferior pole the left kidney   7 mm nephrolithiasis at the inferior pole the left kidney.  The prostate gland is enlarged  There is grade 1 spondylolisthesis at L5-S1 with disc space collapse. This is stable       CT ABDOMEN PELVIS WO CONTRAST at [facility] on 11/16/2022:COMPARISON: 11/15/2021   There is mild cardiomegaly with mitral annulus calcifications and coronary artery calcification  Trace pericardial effusion or pericardial thickening is present.  There is interstitial fibrosis within the lung bases with honeycombing.   There is a loculated effusion within the right lateral costophrenic angle stable from the previous exam  There are cysts involving the left kidney.   More complex exophytic lesions involving the posterior midpole and the left lower pole may represent hemorrhagic cysts.  Anterolisthesis of L4 on L5 and L5 on S1 is present suggesting subluxation at the level of the facets at L4-L5  The prostate gland is mildly enlarged     CT ABDOMEN PELVIS WO on 11/9/23 at [facility], compared to 1/5/23  Pneumobilia, increased from prior.  This may be seen following sphincterotomy or other hepatobiliary procedures.  Recommend correlation with operative history and hepatic enzyme levels.  No portal venous gas or bowel pneumatosis  Nonobstructing let nephrolithiasis and unchanged left renal cysts  Moderate quantity of stool throughout the colon     CT CHEST WO CONTRAST AT [facility] on 3/20/2024: Compared to 11/08/2023  1.3 cm partially calcified subcarinal lymph node, unchanged.   2.0 x 1.5 x 1.9 cm right upper lobe mass, previously 2.0 x 2.5 x 1.9 cm   1.8 x 3.0 x 2.0 right upper lobe mass,  previously 3.5 x 1.9 x 1.6 cm   Emphysematous changes with subpleural reticulations and possible honeycombing and fibrosis are noted with pleural thickening again observed along the right lateral chest wall extending to the diaphragmatic surface.   There are scattered subpleural calcifications      CT ABDOMEN PELVIS WO CONTRAST AT [facility] on 3/20/2024: compared to 11/08/2023  No evidence of metastasis in the abdomen/pelvis.   Multiple left renal cysts. Nonobstructing 4 mm stone at the left lower pole   Mild colonic diverticulosis   No aggressive osseous lesion identified   Multilevel degenerative spondylosis and mild dextroconvex scoliosis.  Grade 1 anterolisthesis at L4-L5 and L5-S1.  Right unilateral L5 pars defect.                    #1 HEMATOLOGICAL HISTORY: IgG kappa MGUS- Dx: 02/23/06.  During the course of Mr. Prater’s follow up, an abnormally elevated protein was noted on one occasion, which led to workup including quantitative immunoglobulins.      An elevated IgG kappa was encountered. This has remained stable in the 2500 range since early 2006.      A bone marrow biopsy and aspirate on 02/23/06 was negative with only 4% plasma cells.      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      24 hour urine for immunoelectrophoresis did not reveal an M-spike.  Serology studies on 9/18/2018 documented an elevated, stable IgG of 2589 with an M spike of 0.7.   Immunofixation continued to reveal IgG monoclonal protein with kappa light chain specificity.        #2 HEMATOLOGICAL HISTORY: Iron deficiency anemia, 9/18/2018  Karoline had serology on 9/18/2018 that identified iron deficiency anemia.   His lab work was obtained at [facility].  An iron level was 12, iron saturation 7%, ferritin 256  Folate >20, and vitamin B12 1044.   Dr. Li placed Karoline on ferrous sulfate 325 mg daily on 9/25/2018 , but this failed to resolve the anemia.     An EGD by Dr. Randy Somers on 12/11/2018 documented a normal esophagus,  normal stomach and a degree of duodenal deformity.  Gastric biopsy was urease negative.     Colonoscopy by Dr. Randy Somers on 1/9/2019 documented a polyp at 80 cm.  Pathology identified a tubular adenoma, negative for high-grade dysplasia.   Feraheme administered.     Labs on 3/13/2019:  Iron 25   Iron saturation 22%   TIBC 116   Ferritin >2000   Reticulocyte count 0.0578      Haptoglobin 341   Folate >20   Vitamin B12 945   Erythropoietin 13     He presented to [facility] on 4/26/2019 with melena and abdominal discomfort. He was subsequently admitted     EGD per Dr. Jj on 4/29/2019 revealed  LA grade (1 or more mucosal breaks greater than 5 mm, not extending between the tops of the 2 mucosal folds)?esophagitis with no bleeding found in the distal esophagus   Stomach was normal, biopsies for H. pylori taken.   Cardia and gastric fundus were normal on retroflexion   One nonbleeding cratered duodenal ulcer with no stigmata of bleeding was found in the duodenal bulb lesion was about 9 mm.   Many nonbleeding cratered, linear and superficial duodenal ulcers with no stigmata of bleeding were found in the second portion of the duodenum. The largest lesion was 6 mm in largest dimension. No mass encountered and anastomosis not seen.     He was admitted to [facility] on 5/8/2019 with melena, hematochezia, anemia, thrombocytopenia     Endoscopy performed by Dr. Bansal on 5/9/2019 revealed  Normal esophagus   Gastric mucosal variant. 2 erythematous spots in the antrum, ? AVM   Normal examined duodenum   Nothing found to account for symptoms   He developed pancytopenia from chemotherapy and did require transfusions.  His hemoglobin dropped to 7.3 on 6/25/2019 after his last treatment. He received 2 units packed red blood cells as an outpatient.     Serology 6/13/2019  Serum Fe - 117  TIBC - 587  Fe sat - 19.9%  Ferritin - 1,000  Iron levels have been adequately replaced. I'm rechecking some serology to consider administration of  Procrit related to a combination of stage III/IV CKD and chemotherapy related anemia.                 TREATMENT SUMMARY  Feraheme 510 mg IV on 2/14 and 2/21/19   Venofer 200 mg IV daily 5/8 - 5/10/2019 as IP at [facility]   s/p 2 units pRBC on 4/26/19 and 2 units pRBC on 4/29/2019 as IP at [facility]?  s/p 2 units pRBC on?5/8/2019   s/p 2 pheresis plts on 5/8/2019  s/p 2 units pRBC as OP 6/26/2019                   Past Medical History:    Past Medical History:   Diagnosis Date    Arthritis     Atrial fibrillation with rapid ventricular response 05/31/2019    Blindness of left eye     BPH with obstruction/lower urinary tract symptoms     Cancer     stomach & lung    CHF (congestive heart failure)     CKD (chronic kidney disease) stage 3, GFR 30-59 ml/min     COPD with acute exacerbation 08/03/2024    Coronary artery disease     Elevated cholesterol     Essential hypertension 10/02/2017    Fibrotic lung diseases     GERD (gastroesophageal reflux disease)     Hearing loss     History of transfusion     Hydronephrosis of left kidney     Hyperlipidemia     Hypertension     pt was taken off of all of his medications for BP (atenolol, lisinopril, lasix) because his BP kept bottoming out so his primary dr told him to discontinue them 1-2 months ago (Jan/Feb 2019). pt states he takes no medications currently.    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 Patel is just going to keep an eye on it and not do surgery right now.    Mediastinal adenopathy 10/24/2018    [facility]    Monoclonal gammopathy of unknown significance (MGUS) 09/11/2018    Pancreatic mass     pt states he had this in 2013 but it went away on its own. Now recent CT shows it has come back so he is going to have an ultrasound on 3/13/19.    Shortness of breath        Past Surgical History:    Past Surgical History:    Procedure Laterality Date    ABDOMINAL SURGERY      BRONCHOSCOPY N/A 10/24/2018    Procedure: BRONCHOSCOPY WITH BIOPSY, EBUS;  Surgeon: Gareth Becerra MD;  Location: [facility] OR;  Service: Pulmonary    CHOLECYSTECTOMY      COLONOSCOPY N/A 1/3/2019    Procedure: COLONOSCOPY WITH ANESTHESIA;  Surgeon: Randy Somers DO;  Location: [facility] ENDOSCOPY;  Service: Gastroenterology    CYSTOSCOPY, RETROGRADE PYELOGRAM AND STENT INSERTION Left 3/8/2019    Procedure: CYSTOSCOPY RETROGRADE BILATERAL PYELOGRAM;  Surgeon: Jos Sylvester MD;  Location: [facility] OR;  Service: Urology    ENDOSCOPY N/A 12/11/2018    Procedure: ESOPHAGOGASTRODUODENOSCOPY WITH ANESTHESIA;  Surgeon: Randy Somers DO;  Location: [facility] ENDOSCOPY;  Service: Gastroenterology    ENDOSCOPY N/A 4/29/2019    Procedure: ESOPHAGOGASTRODUODENOSCOPY WITH ANESTHESIA;  Surgeon: Lilliam Jj MD;  Location:  [facility] OR;  Service: Gastroenterology    ENDOSCOPY N/A 5/9/2019    Procedure: ESOPHAGOGASTRODUODENOSCOPY WITH ANESTHESIA;  Surgeon: Pilo Bansal MD;  Location: [facility] ENDOSCOPY;  Service: Gastroenterology    EYE SURGERY Left 1964    FOOT SURGERY Right 1966    joint    FRACTURE SURGERY      PACEMAKER IMPLANTATION Left 8/29/2024    Procedure: Leadless pacemaker implant and AVN ablation;  Surgeon: Carlitos Bowen MD;  Location: [facility] CATH INVASIVE LOCATION;  Service: Cardiovascular;  Laterality: Left;       Current Hospital Medications:      Current Facility-Administered Medications:     acetaminophen (TYLENOL) tablet 1,000 mg, 1,000 mg, Oral, Nightly, Edmond Izquierdo MD    aspirin EC tablet 81 mg, 81 mg, Oral, Daily, Emdond Izquierdo MD    atorvastatin (LIPITOR) tablet 20 mg, 20 mg, Oral, Nightly, Edmond Izquierdo MD    bisacodyl (DULCOLAX) EC tablet 5 mg, 5 mg, Oral, Daily, Sil Jensen APRN    bisacodyl (DULCOLAX) suppository 10 mg, 10 mg, Rectal, Daily PRN, Edmond Izquierdo MD    budesonide (PULMICORT)  nebulizer solution 0.5 mg, 0.5 mg, Nebulization, BID - RT, SensTarik doherty MD    busPIRone (BUSPAR) tablet 5 mg, 5 mg, Oral, TID With Meals, Edmond Izquierdo MD    cetirizine (zyrTEC) tablet 10 mg, 10 mg, Oral, Daily, Edmond Izquierdo MD    diphenhydrAMINE (BENADRYL) capsule 25 mg, 25 mg, Oral, Nightly PRN, Edmond Izquierdo MD    empagliflozin (JARDIANCE) tablet 10 mg, 10 mg, Oral, Daily, Edmond Izquierdo MD    guaiFENesin (MUCINEX) 12 hr tablet 600 mg, 600 mg, Oral, Q12H, Edmond Izquierdo MD    HYDROcodone-acetaminophen (NORCO) 5-325 MG per tablet 1 tablet, 1 tablet, Oral, TID, Edmond Izquierdo MD    hydrocortisone (ANUSOL-HC) suppository 25 mg, 25 mg, Rectal, BID, Sil Jensen APRN    ipratropium-albuterol (DUO-NEB) nebulizer solution 3 mL, 3 mL, Nebulization, 4x Daily - RT, Edmond Izquierdo MD    isosorbide mononitrate (IMDUR) 24 hr tablet 30 mg, 30 mg, Oral, Daily Before Lunch, Edmond Izquierdo MD    melatonin tablet 10 mg, 10 mg, Oral, Nightly, Edmond Izquierdo MD    metoprolol succinate XL (TOPROL-XL) 24 hr tablet 25 mg, 25 mg, Oral, Daily, Edmond Izquierdo MD    nitroglycerin (NITROSTAT) SL tablet 0.4 mg, 0.4 mg, Sublingual, Q5 Min PRN, Edmond Izquierdo MD    ondansetron ODT (ZOFRAN-ODT) disintegrating tablet 4 mg, 4 mg, Oral, Q6H PRN **OR** ondansetron (ZOFRAN) injection 4 mg, 4 mg, Intravenous, Q6H PRN, Edmond Izquierdo MD    pantoprazole (PROTONIX) EC tablet 40 mg, 40 mg, Oral, Daily, Edmond Izquierdo MD    polyethylene glycol (MIRALAX) packet 17 g, 17 g, Oral, Daily PRN, Edmond Izquierdo MD    predniSONE (DELTASONE) tablet 20 mg, 20 mg, Oral, Daily, Gareth Becerra MD    sodium bicarbonate tablet 650 mg, 650 mg, Oral, TID, Edmond Izquierdo MD    sodium chloride 0.9 % bolus 250 mL, 250 mL, Intravenous, PRN, Edmond Izquierdo MD    sodium chloride nasal spray 2 spray, 2 spray, Each Nare, 5x Daily,  Edmond Izquierdo MD    sodium chloride nasal spray 2 spray, 2 spray, Each Nare, Continuous PRN, Edmond Izquierdo MD    tamsulosin (FLOMAX) 24 hr capsule 0.4 mg, 0.4 mg, Oral, Nightly, Edmond Izquierdo MD    traZODone (DESYREL) tablet 25 mg, 25 mg, Oral, Nightly PRN, Edmond Izquierdo MD    zolpidem (AMBIEN) tablet 2.5 mg, 2.5 mg, Oral, Nightly PRN, Edmond Izquierdo MD    Allergies:   Allergies   Allergen Reactions    Penicillins Hives       Social History:    Social History     Socioeconomic History    Marital status: Single   Tobacco Use    Smoking status: Former     Current packs/day: 0.00     Types: Cigarettes     Start date: 10/29/1954     Quit date: 10/29/2003     Years since quittin.9    Smokeless tobacco: Former     Types: Chew     Quit date:    Vaping Use    Vaping status: Never Used   Substance and Sexual Activity    Alcohol use: No    Drug use: No    Sexual activity: Defer       Family History:   Family History   Problem Relation Age of Onset    Hypertension Mother     Leukemia Father        REVIEW OF SYSTEMS:    Constitutional: no fever, no night sweats, no fatigue;   HEENT: no blurring of vision, no double vision, no hearing difficulty, no tinnitus,no ulceration, no dental caries, no dysphagia     Lungs: Baseline dyspnea at rest, with nasal cannula present but decreased compared to admission to LTAC.  Some dyspnea on exertion but able to walk across the room with assistance and oxygen supplementation  CVS: no palpitation, no chest pain, no shortness of breath  GI: no abdominal pain, no nausea , no vomiting, no constipation  SHELBIE: no dysuria, frequency and urgency, no hematuria, no kidney stones  Musculoskeletal: no joint pain, swelling , stiffness  Endocrine: no polyuria, polydipsia, no cold or heat intolerance  Hematology: no anemia, no easy bruising or bleeding, no history of clotting disorder  Dermatology: no skin rash, no eczema, no pruritus  Psychiatry: no  "depression, no anxiety,no panic attacks, no suicide ideation  Neurology: no syncope, no seizures, no numbness or tingling of hands, no numbness or tingling of feet, no paresis    Vitals:    Ht 162.6 cm (64\")   Wt 74.1 kg (163 lb 6.4 oz)   BMI 28.05 kg/m²     PHYSICAL EXAM:    CONSTITUTIONAL: Alert, appropriate, no acute distress  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  ENT: Mucous membranes moist, no oropharyngeal lesions   NECK: Supple, no masses   CHEST/LUNGS: Scattered diffuse rales at bases, but without wheezing and with fairly clear breath sounds anteriorly  CARDIOVASCULAR: RRR, no murmurs  ABDOMEN: soft nontender, active bowel sounds, no HSM  EXTREMITIES: warm, moves all fours  SKIN: warm, dry with no rashes or lesions  LYMPH: No cervical, clavicular, axillary, or inguinal lymphadenopathy  NEUROLOGIC: follows commands, nonfocal   PSYCH: mood and affect appropriate    CBC  Results from last 7 days   Lab Units 10/17/24  0511 10/14/24  0513   WBC 10*3/mm3 9.05 8.41   HEMOGLOBIN g/dL 11.9* 11.8*   HEMATOCRIT % 40.2 41.3   PLATELETS 10*3/mm3 157 197         Lab Results   Component Value Date     10/14/2024    K 4.8 10/14/2024     (H) 10/14/2024    CO2 27.0 10/14/2024    BUN 45 (H) 10/14/2024    CREATININE 1.71 (H) 10/14/2024    GLUCOSE 93 10/14/2024    CALCIUM 10.0 10/14/2024    BILITOT 0.2 10/09/2024    ALKPHOS 99 10/09/2024    AST 18 10/09/2024    ALT 24 10/09/2024    AGRATIO 0.9 10/09/2024    GLOB 3.0 10/09/2024       Lab Results   Component Value Date    INR 1.04 06/29/2024    INR 1.29 (H) 01/05/2023    INR 1.18 (H) 08/25/2021    PROTIME 14.1 06/29/2024    PROTIME 16.2 (H) 01/05/2023    PROTIME 14.1 (H) 08/25/2021       Cultures:    Lab Results   Component Value Date    BLOODCX No growth at 5 days 09/19/2024     No components found for: \"URINCX\"    ASSESSMENT/PLAN:      Karoline Prater is an 84-year-old  gentleman whom I follow in the office with the following:  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   CKD and chemotherapy associated ANEMIA, previously on Procrit, currently on hold having completed chemotherapy with further improvement in hemoglobin.  Pancreatic mass diagnosed 10/29/03 negative on open biopsy  BPH on Flomax  Orthostatic hypotension medications managed by Dr. Alexis     Fahad completed 4 cycles of combination chemotherapy with carboplatin, Keytruda, Alimta on 7/5/2019.    Maintenance Keytruda and Alimta every 3 weeks was delivered.    The final cycle number #25 of Keytruda/Alimta was delivered on 10/29/2020.     Treatment was held since that time due to issues of dyspnea and shortness of breath, requiring steroids.      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      Mr. Prater has had several hospitalizations and exacerbations of COPD and complications with pulmonary fibrosis in addition to the question of possible progressive disease including possible lymphangitic spread.      He is currently, 10/17/2024, on LTAC  I have been asked to see him for comment and opinion regarding recent imaging findings.  Below are findings and interactions I have had and monitoring Mr. Prater's history of lung cancer.    I discussed imaging with Dr Cool on 7/3/2024 regarding the June 2024 imaging.  Dr. Cool was gracious enough to review imaging studies with the following assessment by Dr. Danny Cool:  \"I have reviewed the CT scan of the thorax on this admission for atrial fibrillation with rapid ventricular response.     He completed recent SBRT radiotherapy for 2 right lung nodules on February 7, 2024.     Review of the current CT scan appears consistent with postradiation change.  We cannot entirely rule out progression however this appears unlikely with the chronology and " "radiographic appearance.     He is scheduled to return to our department in approximately 1 month with follow-up CT scan of the thorax and we will follow this closely with serial CT scans of the thorax.\"     CT scan of the chest without contrast on 9/21/2024 with addendum report is as follows:  Spiculated masslike opacity of the right upper lobe, similar to only minimally decreased compared to 8/3/2024, increased compared to 6/29/2024. Neoplastic process must be considered.   Stable scarring suspected of the right lung apex. Nonspecific irregular 2 x 1 cm nodular focus right middle lobe for which continued imaging surveillance recommended.   Advanced chronic interstitial lung disease with bronchiectasis.   No pneumothorax. Diffusely irregular right pleural thickening remains stable.     This report was signed and finalized on 9/21/2024 1:13 PM by Dr. Betsey Wells MD.    ADDENDUM: Request from Dr. Michele's office for additional comparison  with the earlier imaging examinations, comparison is made with PET/CT  8/20/2024, CT chest without contrast 8/3/2024, CT chest without contrast  6/29/2024, CT chest without contrast 5/6/2024.     Measurements of the spiculated mass in the right upper lobe:     CT chest without contrast 5/6/2024: Approximately 4.0 x 2.2 cm     CT chest without contrast 6/29/202, Approximately 4.7 x 2.1 cm     CT chest without contrast 8/3/2024: Approximately 4.9 x 4.2 cm     PET/CT 8/20/2024: Approximately 3.6 x 1.8 cm.     CT chest without contrast 9/21/2024: Approximately 4.2 x 3.6 cm.     The mass was slowly increasing in size from May, 2024 through August 3,  2024, then it was smaller on the PET/CT from 8/20/2024, which could be a  positive treatment response. The mass then increased in size on  9/21/2024 and that measurement includes more confluent soft tissue  attenuation against the lateral pleural surface, which could be due to  increased volume of tumor tissue, or confluent surrounding " infiltrate or  progressive fibrosis. A repeat PET/CT could help determine if the  increased size and volume of the mass is related to avid tumor or due to  an increase in surrounding infiltrate and fibrosis.     This report was signed and finalized on 10/7/2024 3:44 PM by Dr. Allen Churchill MD.          CT[facility] chest at [facility] on 10/3/2024 reported the following:  Similar masslike spiculated opacity at the RIGHT upper lobe. Similar interlobular septal thickening with increased patchy opacities especially in the RIGHT lung. Appearance highly concerning for malignancy with lymphangitic spread of tumor.   Similar mild adenopathy.  Coronary artery calcifications with metallic device in the RIGHT  ventricular apex, favor wireless pulse generator.  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          I agree with the radiologist's interpretation that the actual tumor mass within the radiation therapy field is similar to previous imaging.  I also agree that there are parenchymal interstitial changes that come and go, sometimes are worse sometimes are diminished with treatment.    In discussion with Dr. Gordon, Mr. Prater appears to be improving since on LTAC anticipating likely improvement in the imaging of the interstitial lung disease problems.    RECOMMENDATIONS:  Repeat CT scan of the chest in 2 to 3 weeks for comparison.  May even consider PET scan if CT scan looks better.  Hopefully we will be able to continue on a chemotherapy holiday without systemic intervention.  Mr. Prater has not been in favor of further systemic therapy over the last couple years and indeed chances of complications would increase given his marginal pulmonary reserve.  So far I do not see obvious evidence of progression.    Mr. Prater was encouraged with this assessment.  He is desiring for physical therapy to become more active and walking further than they have been walking him back and forth in the room.      Jeff  Amarjit Michele MD    10/17/24  06:48 CDT

## 2024-10-17 NOTE — PROGRESS NOTES
[facility] [place] [place]UL[place]ARY [facility] [place]ITICAL CARE PROGRESS NOTE - [facility]    Patient: Karoline Prater  1940   MR# [id number]   Acct# [id number]  10/17/24   10:41 CDT  Referring Provider: Edmond Izquierdo MD    Chief Complaint: Shortness of breath and hypoxia  Interval history: He continues on oxygen at 3 L/min.  He feels like he is better overall.  He wants to walk more.  Meds:  acetaminophen, 1,000 mg, Oral, Nightly  aspirin, 81 mg, Oral, Daily  atorvastatin, 20 mg, Oral, Nightly  bisacodyl, 5 mg, Oral, Daily  budesonide, 0.5 mg, Nebulization, BID - RT  busPIRone, 5 mg, Oral, TID With Meals  cetirizine, 10 mg, Oral, Daily  empagliflozin, 10 mg, Oral, Daily  guaiFENesin, 600 mg, Oral, Q12H  HYDROcodone-acetaminophen, 1 tablet, Oral, TID  hydrocortisone, 25 mg, Rectal, BID  ipratropium-albuterol, 3 mL, Nebulization, 4x Daily - RT  isosorbide mononitrate, 30 mg, Oral, Daily Before Lunch  melatonin, 10 mg, Oral, Nightly  metoprolol succinate XL, 25 mg, Oral, Daily  pantoprazole, 40 mg, Oral, Daily  predniSONE, 20 mg, Oral, Daily  sodium bicarbonate, 650 mg, Oral, TID  sodium chloride, 2 spray, Each Nare, 5x Daily  tamsulosin, 0.4 mg, Oral, Nightly      sodium chloride, 2 spray      Physical Exam:  Temperature 97.6 pulse 73 respirations 24 blood pressure 146/85 saturation 97  Physical Exam  Constitutional:       Appearance: Normal appearance. He is not ill-appearing or diaphoretic.   Eyes:      Extraocular Movements: Extraocular movements intact.   Pulmonary:      Effort: Pulmonary effort is normal. No respiratory distress.      Breath sounds: No wheezing.   Neurological:      Mental Status: He is alert.       Laboratory Data:  Results from last 7 days   Lab Units 10/17/24  0511 10/14/24  0513   WBC 10*3/mm3 9.05 8.41   HEMOGLOBIN g/dL 11.9* 11.8*   PLATELETS 10*3/mm3 157 197     Results from last 7 days   Lab Units 10/17/24  0641 10/14/24  0513   SODIUM mmol/L 140 141   POTASSIUM mmol/L 4.7 4.8   BUN  mg/dL 35* 45*   CREATININE mg/dL 1.55* 1.71*           Microbiology Results (last 10 days)       ** No results found for the last 240 hours. **          Recent films:  No radiology results for the last day    Pulmonary Assessment:  COPD AE, improved  Chronic pulmonary fibrosis, stable  Chronic respiratory failure with hypoxia, compensated  Creatinine elevation improved    Recommend:   Physical therapy, ambulation  Continue tapering steroids as tolerated, reduce dose tomorrow  Continue oxygen    Electronically signed by Gareth Becerra MD, 10/17/24, 10:41 CDT

## 2024-10-17 NOTE — PROGRESS NOTES
[facility] Fall Risk Assessment Note    Name: Karoline Prater  MRN: [id number]  CSN: [id number]  Admit Date/Time: 10/7/2024  4:53 PM.    Currently ordered medications associated with an increased risk for fall include:    Scheduled Meds:  bisacodyl, 5 mg, Oral, Daily  busPIRone, 5 mg, Oral, TID With Meals  cetirizine, 10 mg, Oral, Daily  HYDROcodone-acetaminophen, 1 tablet, Oral, TID  isosorbide mononitrate, 30 mg, Oral, Daily Before Lunch  metoprolol succinate XL, 25 mg, Oral, Daily  predniSONE, 20 mg, Oral, Daily  tamsulosin, 0.4 mg, Oral, Nightly    PRN Meds:    bisacodyl    diphenhydrAMINE    nitroglycerin    polyethylene glycol    traZODone    zolpidem    Comments/Recommendations:  No changes indicated at this time.     Kelby Ding, Pharmacy Intern  10/17/24 15:09 CDT

## 2024-10-18 PROCEDURE — 63710000001 PREDNISONE PER 1 MG: Performed by: INTERNAL MEDICINE

## 2024-10-18 PROCEDURE — 97535 SELF CARE MNGMENT TRAINING: CPT

## 2024-10-18 PROCEDURE — 97530 THERAPEUTIC ACTIVITIES: CPT

## 2024-10-18 PROCEDURE — 97110 THERAPEUTIC EXERCISES: CPT

## 2024-10-18 PROCEDURE — 99232 SBSQ HOSP IP/OBS MODERATE 35: CPT | Performed by: INTERNAL MEDICINE

## 2024-10-18 PROCEDURE — 97116 GAIT TRAINING THERAPY: CPT

## 2024-10-18 RX ORDER — HYDROCODONE BITARTRATE AND ACETAMINOPHEN 5; 325 MG/1; MG/1
1 TABLET ORAL 3 TIMES DAILY
Status: DISPENSED | OUTPATIENT
Start: 2024-10-18 | End: 2024-10-23

## 2024-10-18 RX ORDER — PREDNISONE 10 MG/1
10 TABLET ORAL DAILY
Status: DISCONTINUED | OUTPATIENT
Start: 2024-10-19 | End: 2024-11-01 | Stop reason: HOSPADM

## 2024-10-18 NOTE — PROGRESS NOTES
[facility]        Internal Medicine Progress Note    10/18/2024   10:07 CDT    Name:  Karoline Prater  MRN:    [id number]     Acct:     [id number]   Room:  [address] [place] [address] Day: 0     Admit Date: 10/7/2024  4:53 PM  PCP: Irving Alexis MD    Subjective:     C/C: weakness, shortness of breath    Interval History: Status:  improved. Up to chair. No family at bedside. Afebrile. Maintaining adequate 02 sats with 02 at 4 lpm at rest.  Progressing with therapies. Reports that he \"overdid it\" with therapies yesterday and didn't sleep well overnight so he's dealing with some fatigue today.     Review of Systems   Constitutional: Positive for malaise/fatigue. Negative for chills, decreased appetite, weight gain and weight loss.   HENT:  Negative for congestion, ear discharge, hoarse voice and tinnitus.    Eyes:  Negative for blurred vision, discharge, visual disturbance and visual halos.   Cardiovascular:  Positive for dyspnea on exertion. Negative for chest pain, claudication, irregular heartbeat, leg swelling, orthopnea and paroxysmal nocturnal dyspnea.   Respiratory:  Positive for shortness of breath. Negative for cough, sputum production and wheezing.    Endocrine: Negative for cold intolerance, heat intolerance and polyuria.   Hematologic/Lymphatic: Negative for adenopathy. Does not bruise/bleed easily.   Skin:  Negative for dry skin, itching and suspicious lesions.   Musculoskeletal:  Negative for arthritis, back pain, falls, joint pain, muscle weakness and myalgias.   Gastrointestinal:  Negative for abdominal pain, constipation, diarrhea, dysphagia and hematemesis.   Genitourinary:  Negative for bladder incontinence, dysuria and frequency.   Neurological:  Positive for weakness. Negative for aphonia, disturbances in coordination and dizziness.   Psychiatric/Behavioral:  Negative for altered mental status, depression, memory loss and substance abuse. The patient " does not have insomnia and is not nervous/anxious.          Medications:     Allergies:   Allergies   Allergen Reactions    Penicillins Hives       Current Meds:   Current Facility-Administered Medications:     acetaminophen (TYLENOL) tablet 1,000 mg, 1,000 mg, Oral, Nightly, Edmond Izquierdo MD    aspirin EC tablet 81 mg, 81 mg, Oral, Daily, Edmond Izquierdo MD    atorvastatin (LIPITOR) tablet 20 mg, 20 mg, Oral, Nightly, Edmond Izquierdo MD    bisacodyl (DULCOLAX) EC tablet 5 mg, 5 mg, Oral, Daily, Silviaosrob, Sil Lucy, APRN    bisacodyl (DULCOLAX) suppository 10 mg, 10 mg, Rectal, Daily PRN, Edmond Izquierdo MD    budesonide (PULMICORT) nebulizer solution 0.5 mg, 0.5 mg, Nebulization, BID - RT, Tarik Crocker MD    busPIRone (BUSPAR) tablet 5 mg, 5 mg, Oral, TID With Meals, Edmond Izquierdo MD    cetirizine (zyrTEC) tablet 10 mg, 10 mg, Oral, Daily, Edmond Izquierdo MD    diphenhydrAMINE (BENADRYL) capsule 25 mg, 25 mg, Oral, Nightly PRN, Edmond Izquierdo MD    empagliflozin (JARDIANCE) tablet 10 mg, 10 mg, Oral, Daily, Edmond Izquierdo MD    guaiFENesin (MUCINEX) 12 hr tablet 600 mg, 600 mg, Oral, Q12H, Edmond Izquierdo MD    HYDROcodone-acetaminophen (NORCO) 5-325 MG per tablet 1 tablet, 1 tablet, Oral, TID, Edmond Izquierdo MD    hydrocortisone (ANUSOL-HC) suppository 25 mg, 25 mg, Rectal, BID, Mikey, Sil Lucy, APRN    ipratropium-albuterol (DUO-NEB) nebulizer solution 3 mL, 3 mL, Nebulization, 4x Daily - RT, Edmond Izquierdo MD    isosorbide mononitrate (IMDUR) 24 hr tablet 30 mg, 30 mg, Oral, Daily Before Lunch, Edmond Izquierdo MD    melatonin tablet 10 mg, 10 mg, Oral, Nightly, Edmond Izquierdo MD    metoprolol succinate XL (TOPROL-XL) 24 hr tablet 25 mg, 25 mg, Oral, Daily, Edmond Izquierdo MD    nitroglycerin (NITROSTAT) SL tablet 0.4 mg, 0.4 mg, Sublingual, Q5 Min PRN, Edmond Izquierdo MD    ondansetron ODT  "(ZOFRAN-ODT) disintegrating tablet 4 mg, 4 mg, Oral, Q6H PRN **OR** ondansetron (ZOFRAN) injection 4 mg, 4 mg, Intravenous, Q6H PRN, Edmond Izquierdo MD    pantoprazole (PROTONIX) EC tablet 40 mg, 40 mg, Oral, Daily, Edmond Izquierdo MD    polyethylene glycol (MIRALAX) packet 17 g, 17 g, Oral, Daily PRN, Edmond Izquierdo MD    predniSONE (DELTASONE) tablet 20 mg, 20 mg, Oral, Daily, Gareth Becerra MD    sodium bicarbonate tablet 650 mg, 650 mg, Oral, TID, Edmond Izquierdo MD    sodium chloride 0.9 % bolus 250 mL, 250 mL, Intravenous, PRN, Edmond Izquierdo MD    sodium chloride nasal spray 2 spray, 2 spray, Each Nare, 5x Daily, Edmond Izquierdo MD    sodium chloride nasal spray 2 spray, 2 spray, Each Nare, Continuous PRN, Edmond Izquierdo MD    tamsulosin (FLOMAX) 24 hr capsule 0.4 mg, 0.4 mg, Oral, Nightly, Edmond Izquierdo MD    traZODone (DESYREL) tablet 25 mg, 25 mg, Oral, Nightly PRN, Edmond Izquierdo MD    Data:     Code Status:    There are no questions and answers to display.       Family History   Problem Relation Age of Onset    Hypertension Mother     Leukemia Father        Social History     Socioeconomic History    Marital status: Single   Tobacco Use    Smoking status: Former     Current packs/day: 0.00     Types: Cigarettes     Start date: 10/29/1954     Quit date: 10/29/2003     Years since quittin.9    Smokeless tobacco: Former     Types: Chew     Quit date:    Vaping Use    Vaping status: Never Used   Substance and Sexual Activity    Alcohol use: No    Drug use: No    Sexual activity: Defer       Vitals:  Ht 162.6 cm (64\")   Wt 74.1 kg (163 lb 6.4 oz)   BMI 28.05 kg/m²   T 98.5 P 61 R 19 2 /73 Sp02 95% (4 lpm)          I/O (24Hr):  No intake or output data in the 24 hours ending 10/18/24 1007    Labs and imaging:      No results found for this or any previous visit (from the past 12 " hours).                                  Physical Examination:        Physical Exam  Vitals and nursing note reviewed.   Constitutional:       Appearance: He is well-developed.   HENT:      Head: Normocephalic and atraumatic.      Nose: Nose normal.      Mouth/Throat:      Mouth: Mucous membranes are moist.      Pharynx: Oropharynx is clear.   Eyes:      Pupils: Pupils are equal, round, and reactive to light.      Comments: Left eye enucleated   Cardiovascular:      Rate and Rhythm: Normal rate and regular rhythm.      Heart sounds: Normal heart sounds.   Pulmonary:      Effort: Pulmonary effort is normal.      Breath sounds: Normal breath sounds.   Abdominal:      General: Bowel sounds are normal.      Palpations: Abdomen is soft.   Musculoskeletal:         General: Normal range of motion.      Cervical back: Normal range of motion and neck supple.      Comments: Generalized weakness   Skin:     General: Skin is warm and dry.   Neurological:      Mental Status: He is alert and oriented to person, place, and time.      Deep Tendon Reflexes: Reflexes are normal and symmetric.   Psychiatric:         Behavior: Behavior normal.           Assessment:             Stage 3b chronic kidney disease    A-fib    Former smoker    COPD (chronic obstructive pulmonary disease)    History of radiation therapy    Chronic heart failure with preserved ejection fraction (HFpEF)    Atrial flutter    COVID-19 virus infection    Acute-on-chronic respiratory failure    Epistaxis    Chronic rhinitis    History of pneumonia    Supplemental oxygen dependent    Past Medical History:   Diagnosis Date    Arthritis     Atrial fibrillation with rapid ventricular response 05/31/2019    Blindness of left eye     BPH with obstruction/lower urinary tract symptoms     Cancer     stomach & lung    CHF (congestive heart failure)     CKD (chronic kidney disease) stage 3, GFR 30-59 ml/min     COPD with acute exacerbation 08/03/2024    Coronary artery  disease     Elevated cholesterol     Essential hypertension 10/02/2017    Fibrotic lung diseases     GERD (gastroesophageal reflux disease)     Hearing loss     History of transfusion     Hydronephrosis of left kidney     Hyperlipidemia     Hypertension     pt was taken off of all of his medications for BP (atenolol, lisinopril, lasix) because his BP kept bottoming out so his primary dr told him to discontinue them 1-2 months ago (Jan/Feb 2019). pt states he takes no medications currently.    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 Patel is just going to keep an eye on it and not do surgery right now.    Mediastinal adenopathy 10/24/2018    Station 7    Monoclonal gammopathy of unknown significance (MGUS) 09/11/2018    Pancreatic mass     pt states he had this in 2013 but it went away on its own. Now recent CT shows it has come back so he is going to have an ultrasound on 3/13/19.    Shortness of breath         Plan:        Acute on chronic hypoxic respiratory failure  Stage 4 metastatic non small cell lung cancer  COPD  Interstitial lung disease  Chronic diastolic CHF  CKD3  Hyperkalemia  Multifactorial anemia  Anxiety  Hemorrhoids  Constipation    Continue current treatment. Monitor counts. Increase activity. Labs Monday. Continue oxygen weaning as tolerated under direction of pulmonology. Aggressive therapies as tolerated. Maintain patient safety. Adjust bowel regimen as needed.       Electronically signed by MARITZA Greenwood on 10/18/2024 at 10:07 CDT     I have discussed the care of Karoline Prater, including pertinent history and exam findings, with the nurse practitioner.    I have seen and examined the patient and the key elements of all parts of the encounter have been performed by me.  I agree with the assessment, plan and orders as  documented by MARITZA Frias, after I modified the exam findings and the plan of treatments and the final version is my approved version of the assessment.        Electronically signed by Edmond Izquierdo MD on 10/19/2024 at 07:03 CDT

## 2024-10-18 NOTE — PROGRESS NOTES
[facility] [place] PULMONARY AND CRITICAL CARE PROGRESS NOTE - [facility]    Patient: Karoline Prater  1940   MR# [id number]   Acct# [id number]  10/18/24   09:47 CDT  Referring Provider: Edmond Izquierdo MD    Chief Complaint: Hypoxic respiratory failure  Interval history: He has no new complaints.  He feels better.  Trying to walk more.  Meds:  acetaminophen, 1,000 mg, Oral, Nightly  aspirin, 81 mg, Oral, Daily  atorvastatin, 20 mg, Oral, Nightly  bisacodyl, 5 mg, Oral, Daily  budesonide, 0.5 mg, Nebulization, BID - RT  busPIRone, 5 mg, Oral, TID With Meals  cetirizine, 10 mg, Oral, Daily  empagliflozin, 10 mg, Oral, Daily  guaiFENesin, 600 mg, Oral, Q12H  HYDROcodone-acetaminophen, 1 tablet, Oral, TID  hydrocortisone, 25 mg, Rectal, BID  ipratropium-albuterol, 3 mL, Nebulization, 4x Daily - RT  isosorbide mononitrate, 30 mg, Oral, Daily Before Lunch  melatonin, 10 mg, Oral, Nightly  metoprolol succinate XL, 25 mg, Oral, Daily  pantoprazole, 40 mg, Oral, Daily  predniSONE, 20 mg, Oral, Daily  sodium bicarbonate, 650 mg, Oral, TID  sodium chloride, 2 spray, Each Nare, 5x Daily  tamsulosin, 0.4 mg, Oral, Nightly      sodium chloride, 2 spray      Physical Exam:  Vital signs still pending at the time of my visit  Physical Exam  Constitutional:       General: He is not in acute distress.     Appearance: He is toxic-appearing.   Pulmonary:      Effort: Pulmonary effort is normal. No accessory muscle usage, prolonged expiration or respiratory distress.      Breath sounds: No wheezing.   Neurological:      Mental Status: He is alert.       Laboratory Data:  Results from last 7 days   Lab Units 10/17/24  0511 10/14/24  0513   WBC 10*3/mm3 9.05 8.41   HEMOGLOBIN g/dL 11.9* 11.8*   PLATELETS 10*3/mm3 157 197     Results from last 7 days   Lab Units 10/17/24  0641 10/14/24  0513   SODIUM mmol/L 140 141   POTASSIUM mmol/L 4.7 4.8   BUN mg/dL 35* 45*   CREATININE mg/dL 1.55* 1.71*           Microbiology  Results (last 10 days)       ** No results found for the last 240 hours. **          Recent films:  No radiology results for the last day    Pulmonary Assessment:  Acute exacerbation COPD improved  Pulmonary fibrosis stable  Chronic respiratory failure with hypoxia, stable      Recommend:   Continue mobilization  Reduce prednisone to 10 mg/day  Continue oxygen    Electronically signed by Gareth Becerra MD, 10/18/24, 09:47 CDT

## 2024-10-18 NOTE — PROGRESS NOTES
Progress Note    Patient name: Karoline Prater  Patient : 1940  VISIT # [id number]  MR #[id number]  Room:     Subjective     Mr. Prater states that he had a good workout with physical therapy yesterday.  Working walking back and forth in the room and down the dozier in the wheelchair.  He is much more encouraged and desiring a goal to get home before to off along once he is able.    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   CKD and chemotherapy associated ANEMIA, previously on Procrit, currently on hold having completed chemotherapy with further improvement in hemoglobin.  Pancreatic mass diagnosed 10/29/03 negative on open biopsy  BPH on Flomax  Orthostatic hypotension medications managed by Dr. Reuben Vásquez completed 4 cycles of combination chemotherapy with carboplatin, Keytruda, Alimta on 2019.    Maintenance Keytruda and Alimta every 3 weeks was delivered.    The final cycle number #25 of Keytruda/Alimta was delivered on 10/29/2020.     Treatment was held since that time due to issues of dyspnea and shortness of breath, requiring steroids.      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      Mr. Prater has had several hospitalizations and exacerbations of COPD and complications with pulmonary fibrosis in addition to the question of possible progressive disease including possible lymphangitic spread.       He is currently, 10/17/2024, on LTAC  I have been asked to see him for comment and opinion regarding recent imaging findings.  Below are findings and interactions I have had and monitoring Mr. Prater's history of  "lung cancer.     I discussed imaging with Dr Cool on 7/3/2024 regarding the June 2024 imaging.  Dr. Cool was gracious enough to review imaging studies with the following assessment by Dr. Danny Cool:  \"I have reviewed the CT scan of the thorax on this admission for atrial fibrillation with rapid ventricular response.     He completed recent SBRT radiotherapy for 2 right lung nodules on February 7, 2024.     Review of the current CT scan appears consistent with postradiation change.  We cannot entirely rule out progression however this appears unlikely with the chronology and radiographic appearance.     He is scheduled to return to our department in approximately 1 month with follow-up CT scan of the thorax and we will follow this closely with serial CT scans of the thorax.\"           CT scan of the chest without contrast on 9/21/2024 with addendum report is as follows:  Spiculated masslike opacity of the right upper lobe, similar to only minimally decreased compared to 8/3/2024, increased compared to 6/29/2024. Neoplastic process must be considered.   Stable scarring suspected of the right lung apex. Nonspecific irregular 2 x 1 cm nodular focus right middle lobe for which continued imaging surveillance recommended.   Advanced chronic interstitial lung disease with bronchiectasis.   No pneumothorax. Diffusely irregular right pleural thickening remains stable.     This report was signed and finalized on 9/21/2024 1:13 PM by Dr. Betsey Wells MD.     ADDENDUM: Request from Dr. Michele's office for additional comparison  with the earlier imaging examinations, comparison is made with PET/CT  8/20/2024, CT chest without contrast 8/3/2024, CT chest without contrast  6/29/2024, CT chest without contrast 5/6/2024.     Measurements of the spiculated mass in the right upper lobe:     CT chest without contrast 5/6/2024: Approximately 4.0 x 2.2 cm     CT chest without contrast 6/29/202, Approximately 4.7 x 2.1 cm   "   CT chest without contrast 8/3/2024: Approximately 4.9 x 4.2 cm     PET/CT 8/20/2024: Approximately 3.6 x 1.8 cm.     CT chest without contrast 9/21/2024: Approximately 4.2 x 3.6 cm.     The mass was slowly increasing in size from May, 2024 through August 3,  2024, then it was smaller on the PET/CT from 8/20/2024, which could be a  positive treatment response. The mass then increased in size on  9/21/2024 and that measurement includes more confluent soft tissue  attenuation against the lateral pleural surface, which could be due to  increased volume of tumor tissue, or confluent surrounding infiltrate or  progressive fibrosis. A repeat PET/CT could help determine if the  increased size and volume of the mass is related to avid tumor or due to  an increase in surrounding infiltrate and fibrosis.     This report was signed and finalized on 10/7/2024 3:44 PM by Dr. Allen Churchill MD.             CTA chest at [facility] on 10/3/2024 reported the following:  Similar masslike spiculated opacity at the RIGHT upper lobe. Similar interlobular septal thickening with increased patchy opacities especially in the RIGHT lung. Appearance highly concerning for malignancy with lymphangitic spread of tumor.   Similar mild adenopathy.  Coronary artery calcifications with metallic device in the RIGHT  ventricular apex, favor wireless pulse generator.  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             I agree with the radiologist's interpretation that the actual tumor mass within the radiation therapy field is similar to previous imaging.  I also agree that there are parenchymal interstitial changes that come and go, sometimes are worse sometimes are diminished with treatment.     In discussion with Dr. Gordon, Mr. Prater appears to be improving since on LTAC anticipating likely improvement in the imaging of the interstitial lung disease problems.      REVIEW OF SYSTEMS:   History obtained from chart review  and the patient  General: positive for  - fatigue   ENT: negative for - oral lesions  Allergy and Immunology: negative   Hematological and Lymphatic:negative for - weight loss  Respiratory:  Baseline dyspnea at rest, with nasal cannula present but decreased compared to admission to LTAC.  Some dyspnea on exertion but able to walk across the room with assistance and oxygen supplementation   Cardiovascular: negative for - chest pain or palpitations  Gastrointestinal: positive for - appetite loss and diarrhea  Genito-Urinary: negative for - dysuria or hematuria  Musculoskeletal: positive for - generalized weakness  Neurological: negative for - confusion, dizziness or headaches  Dermatological: negative for - pruritus and rash    Objective     Vital Signs     No intake or output data in the 24 hours ending 10/18/24 0649    PHYSICAL EXAM:  General Appearance: Alert, cooperative, in no acute distress  Head: Normocephalic, without obvious abnormality, atraumatic  Eyes: Normal conjunctivae and sclerae, anicteric  Ears: Ears appear intact with no abnormalities noted, hearing grossly normal  Throat: No oral lesions, no thrush, oral mucosa moist  Neck: No adenopathy, supple, trachea midline, no JVD  Lungs: Scattered diffuse rales at bases, but without wheezing and with fairly clear breath sounds anteriorly   Heart: Regular rhythm and normal rate, no murmurs, gallops or rubs  Abdomen: Normal bowel sounds, no masses, no organomegaly, soft non-tender, non-distended  Genitalia: Deferred  Extremities: Moves all extremities equally well, no edema, no cyanosis, no redness  Skin: No bleeding, bruising or rash  Lymph nodes: No palpable adenopathy  Neurologic: Grossly intact though not formally tested        CBC  Results from last 7 days   Lab Units 10/17/24  0511 10/14/24  0513   WBC 10*3/mm3 9.05 8.41   HEMOGLOBIN g/dL 11.9* 11.8*   HEMATOCRIT % 40.2 41.3   PLATELETS 10*3/mm3 157 197   LYMPHOCYTE % %  --  5.1*   MONOCYTES % %  --   "2.0*       CMP  Lab Results   Component Value Date    GLUCOSE 94 10/17/2024    BUN 35 (H) 10/17/2024    CREATININE 1.55 (H) 10/17/2024    EGFRIFNONA 29 (L) 08/27/2021    BCR 22.6 10/17/2024    CO2 27.0 10/17/2024    CALCIUM 9.6 10/17/2024    ALBUMIN 2.7 (L) 10/09/2024    AST 18 10/09/2024    ALT 24 10/09/2024             No results found for: \"BLOODCX\", \"URINECX\", \"WOUNDCX\", \"MRSACX\", \"RESPCX\", \"STOOLCX\"    Imaging Results (Last 72 Hours)       ** No results found for the last 72 hours. 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   CKD and chemotherapy associated ANEMIA, previously on Procrit, currently on hold having completed chemotherapy with further improvement in hemoglobin.  Pancreatic mass diagnosed 10/29/03 negative on open biopsy  BPH on Flomax  Orthostatic hypotension medications managed by Dr. Reuben Vásquez completed 4 cycles of combination chemotherapy with carboplatin, Keytruda, Alimta on 7/5/2019.    Maintenance Keytruda and Alimta every 3 weeks was delivered.    The final cycle number #25 of Keytruda/Alimta was delivered on 10/29/2020.     Treatment was held since that time due to issues of dyspnea and shortness of breath, requiring steroids.      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      Mr. Prater has had several hospitalizations and exacerbations of COPD and complications with pulmonary fibrosis in addition to the question of possible progressive disease including possible lymphangitic spread.       He is currently, 10/17/2024, on LTAC  I have been asked to see him for comment and opinion regarding recent imaging findings.  Below are findings and interactions I have had and monitoring Mr. Prater's history of lung cancer.     I discussed imaging with Dr Cool on 7/3/2024 regarding the June 2024 imaging.  Dr. Cool was gracious enough to review imaging studies with the following assessment by Dr. Danny Cool:  \"I have reviewed the CT scan of the thorax on this admission for atrial fibrillation with rapid ventricular response.     He completed recent SBRT radiotherapy for 2 right lung nodules on February 7, 2024.     Review of the current CT scan appears consistent with postradiation change.  We cannot entirely rule out progression however this appears unlikely with the chronology and radiographic appearance.     He is scheduled to return to our department in approximately 1 month with follow-up CT scan of the thorax and we will follow this closely with serial CT scans of the thorax.\"           CT scan of the chest without contrast on 9/21/2024 with addendum report is as follows:  Spiculated masslike opacity of the right upper lobe, similar to only minimally decreased compared to 8/3/2024, increased compared to 6/29/2024. Neoplastic process must be considered.   Stable scarring suspected of the right lung apex. Nonspecific irregular 2 x 1 cm nodular focus right middle lobe for which continued imaging surveillance recommended.   Advanced chronic interstitial lung disease with bronchiectasis.   No pneumothorax. Diffusely irregular right pleural thickening remains stable.     This report was signed and finalized on " 9/21/2024 1:13 PM by Dr. Betsey Wells MD.     ADDENDUM: Request from Dr. Michele's office for additional comparison  with the earlier imaging examinations, comparison is made with PET/CT  8/20/2024, CT chest without contrast 8/3/2024, CT chest without contrast  6/29/2024, CT chest without contrast 5/6/2024.     Measurements of the spiculated mass in the right upper lobe:     CT chest without contrast 5/6/2024: Approximately 4.0 x 2.2 cm     CT chest without contrast 6/29/202, Approximately 4.7 x 2.1 cm     CT chest without contrast 8/3/2024: Approximately 4.9 x 4.2 cm     PET/CT 8/20/2024: Approximately 3.6 x 1.8 cm.     CT chest without contrast 9/21/2024: Approximately 4.2 x 3.6 cm.     The mass was slowly increasing in size from May, 2024 through August 3,  2024, then it was smaller on the PET/CT from 8/20/2024, which could be a  positive treatment response. The mass then increased in size on  9/21/2024 and that measurement includes more confluent soft tissue  attenuation against the lateral pleural surface, which could be due to  increased volume of tumor tissue, or confluent surrounding infiltrate or  progressive fibrosis. A repeat PET/CT could help determine if the  increased size and volume of the mass is related to avid tumor or due to  an increase in surrounding infiltrate and fibrosis.     This report was signed and finalized on 10/7/2024 3:44 PM by Dr. Allen Churchill MD.             CTA chest at [facility] on 10/3/2024 reported the following:  Similar masslike spiculated opacity at the RIGHT upper lobe. Similar interlobular septal thickening with increased patchy opacities especially in the RIGHT lung. Appearance highly concerning for malignancy with lymphangitic spread of tumor.   Similar mild adenopathy.  Coronary artery calcifications with metallic device in the RIGHT  ventricular apex, favor wireless pulse generator.  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             I  agree with the radiologist's interpretation that the actual tumor mass within the radiation therapy field is similar to previous imaging.  I also agree that there are parenchymal interstitial changes that come and go, sometimes are worse sometimes are diminished with treatment.     In discussion with Dr. Gordon, Mr. Prater appears to be improving since on LTAC anticipating likely improvement in the imaging of the interstitial lung disease problems.    RECOMMENDATIONS:  Repeat CT scan of the chest in 2 to 3 weeks for comparison.  May even consider PET scan if CT scan looks better.  Hopefully we will be able to continue on a chemotherapy holiday without systemic intervention.  Mr. Prater has not been in favor of further systemic therapy over the last couple years and indeed chances of complications would increase given his marginal pulmonary reserve.  So far I do not see obvious evidence of progression.     Mr. Prater was encouraged with this assessment.    Mr. Prater states that he had a good workout with physical therapy yesterday.  Working walking back and forth in the room and down the dozier in the wheelchair.  He is much more encouraged and desiring a goal to get home before to off along once he is able.      Jeff Michele MD  10/18/24  06:49 CDT

## 2024-10-19 PROCEDURE — 97116 GAIT TRAINING THERAPY: CPT

## 2024-10-19 PROCEDURE — 99231 SBSQ HOSP IP/OBS SF/LOW 25: CPT | Performed by: INTERNAL MEDICINE

## 2024-10-19 PROCEDURE — 63710000001 PREDNISONE PER 5 MG: Performed by: INTERNAL MEDICINE

## 2024-10-19 PROCEDURE — 63710000001 DIPHENHYDRAMINE PER 50 MG: Performed by: INTERNAL MEDICINE

## 2024-10-19 RX ORDER — HYDROCORTISONE ACETATE 25 MG/1
25 SUPPOSITORY RECTAL EVERY 12 HOURS PRN
Status: DISCONTINUED | OUTPATIENT
Start: 2024-10-19 | End: 2024-11-01 | Stop reason: HOSPADM

## 2024-10-19 NOTE — PROGRESS NOTES
[facility] [place]ULMONARY AND CRITICAL CARE PROGRESS NOTE - [facility]    Patient: Karoline Prater  1940   MR# [id number]   Acct# [id number]  10/19/24   07:04 CDT  Referring Provider: Edmond Izquierdo MD    Chief Complaint: Hypoxic respiratory failure  Interval history: He has no new complaints.  He is sitting up in the bed eating breakfast.  He is on 4 L nasal cannula and his O2 sat is 86% while eating.  No new labs to review today.  He has no new complaints.    Meds:  acetaminophen, 1,000 mg, Oral, Nightly  aspirin, 81 mg, Oral, Daily  atorvastatin, 20 mg, Oral, Nightly  bisacodyl, 5 mg, Oral, Daily  budesonide, 0.5 mg, Nebulization, BID - RT  busPIRone, 5 mg, Oral, TID With Meals  cetirizine, 10 mg, Oral, Daily  empagliflozin, 10 mg, Oral, Daily  guaiFENesin, 600 mg, Oral, Q12H  HYDROcodone-acetaminophen, 1 tablet, Oral, TID  hydrocortisone, 25 mg, Rectal, BID  ipratropium-albuterol, 3 mL, Nebulization, 4x Daily - RT  isosorbide mononitrate, 30 mg, Oral, Daily Before Lunch  melatonin, 10 mg, Oral, Nightly  metoprolol succinate XL, 25 mg, Oral, Daily  pantoprazole, 40 mg, Oral, Daily  predniSONE, 10 mg, Oral, Daily  sodium bicarbonate, 650 mg, Oral, TID  sodium chloride, 2 spray, Each Nare, 5x Daily  tamsulosin, 0.4 mg, Oral, Nightly      sodium chloride, 2 spray      Physical Exam:  Vital signs still pending at the time of my visit  T: 98.7, HR: 65, RR: 28, BP: 138/73, O2 sat: 98%  Physical Exam  Vitals reviewed.   Constitutional:       General: He is not in acute distress.     Interventions: Nasal cannula in place.   Pulmonary:      Effort: Pulmonary effort is normal. No accessory muscle usage, prolonged expiration or respiratory distress.      Breath sounds: No wheezing.   Neurological:      Mental Status: He is alert.      Motor: Weakness present.     Electronically signed by MARITZA Hawthorne, 10/19/2024, 07:04 CDT      Physician Substantive Portion: Medical Decision Making  to follow:      Result Review    Laboratory Data:  Results from last 7 days   Lab Units 10/17/24  0511 10/14/24  0513   WBC 10*3/mm3 9.05 8.41   HEMOGLOBIN g/dL 11.9* 11.8*   PLATELETS 10*3/mm3 157 197     Results from last 7 days   Lab Units 10/17/24  0641 10/14/24  0513   SODIUM mmol/L 140 141   POTASSIUM mmol/L 4.7 4.8   CHLORIDE mmol/L 105 108*   CO2 mmol/L 27.0 27.0   BUN mg/dL 35* 45*   CREATININE mg/dL 1.55* 1.71*   CALCIUM mg/dL 9.6 10.0         Lab 10/17/24  0641 10/14/24  0513   GLUCOSE 94 93             Lab 10/14/24  0513   PROBNP 2,488.0*     Microbiology Results (last 10 days)       ** No results found for the last 240 hours. **           Recent films:  No radiology results from the last 24 hrs         Pulmonary Assessment:  Chronic resp failure with hypoxia stable  Deconditioning and weakness    Recommend/plan:   Continue current support  Continue PT  Dr. Crocker will follow up on Monday  Call if we are needed before then    This visit was performed by both a physician and an Advanced Practice RN.  I performed all aspects of the medical decision making as documented.  Electronically signed by Gareth Becerra MD, 10/19/2024, 12:34 CDT

## 2024-10-19 NOTE — PROGRESS NOTES
Timbo Izquierdo M.D.  MARITZA Frias        Internal Medicine Progress Note    10/19/2024   08:53 CDT    Name:  Karloine Prater  MRN:    [id number]     Acct:     [id number]   Room:  [address] [place] [address] Day: 0     Admit Date: 10/7/2024  4:53 PM  PCP: Irving Alexis MD    Subjective:     C/C: weakness, shortness of breath    Interval History: Status:  improved. Resting in bed, awakened from sleep. O2 sats stable on 4L per NC. Pt denies needs. PCT at bedside delivering breakfast tray.      Review of Systems   Constitutional: Positive for malaise/fatigue. Negative for chills, decreased appetite, weight gain and weight loss.   HENT:  Negative for congestion, ear discharge, hoarse voice and tinnitus.    Eyes:  Negative for blurred vision, discharge, visual disturbance and visual halos.   Cardiovascular:  Positive for dyspnea on exertion. Negative for chest pain, claudication, irregular heartbeat, leg swelling, orthopnea and paroxysmal nocturnal dyspnea.   Respiratory:  Positive for shortness of breath. Negative for cough, sputum production and wheezing.    Endocrine: Negative for cold intolerance, heat intolerance and polyuria.   Hematologic/Lymphatic: Negative for adenopathy. Does not bruise/bleed easily.   Skin:  Negative for dry skin, itching and suspicious lesions.   Musculoskeletal:  Negative for arthritis, back pain, falls, joint pain, muscle weakness and myalgias.   Gastrointestinal:  Negative for abdominal pain, constipation, diarrhea, dysphagia and hematemesis.   Genitourinary:  Negative for bladder incontinence, dysuria and frequency.   Neurological:  Positive for weakness. Negative for aphonia, disturbances in coordination and dizziness.   Psychiatric/Behavioral:  Negative for altered mental status, depression, memory loss and substance abuse. The patient does not have insomnia and is not nervous/anxious.          Medications:     Allergies:   Allergies   Allergen Reactions     Penicillins Hives       Current Meds:   Current Facility-Administered Medications:     acetaminophen (TYLENOL) tablet 1,000 mg, 1,000 mg, Oral, Nightly, Edmond Izquierdo MD    aspirin EC tablet 81 mg, 81 mg, Oral, Daily, Edmond Izquierdo MD    atorvastatin (LIPITOR) tablet 20 mg, 20 mg, Oral, Nightly, Edmond Izquierdo MD    bisacodyl (DULCOLAX) EC tablet 5 mg, 5 mg, Oral, Daily, Shahnazdlosrob, Sil Lucy, APRN    bisacodyl (DULCOLAX) suppository 10 mg, 10 mg, Rectal, Daily PRN, Edmond Izquierdo MD    budesonide (PULMICORT) nebulizer solution 0.5 mg, 0.5 mg, Nebulization, BID - RT, Tarik Crocker MD    busPIRone (BUSPAR) tablet 5 mg, 5 mg, Oral, TID With Meals, Edmond Izquierdo MD    cetirizine (zyrTEC) tablet 10 mg, 10 mg, Oral, Daily, Edmond Izquierdo MD    diphenhydrAMINE (BENADRYL) capsule 25 mg, 25 mg, Oral, Nightly PRN, Edmond Izquierdo MD    empagliflozin (JARDIANCE) tablet 10 mg, 10 mg, Oral, Daily, Edmond Izquierdo MD    guaiFENesin (MUCINEX) 12 hr tablet 600 mg, 600 mg, Oral, Q12H, Edmond Izquierdo MD    HYDROcodone-acetaminophen (NORCO) 5-325 MG per tablet 1 tablet, 1 tablet, Oral, TID, Edmond Izquierdo MD    hydrocortisone (ANUSOL-HC) suppository 25 mg, 25 mg, Rectal, BID, Shahnazdlosrob, Sil Lucy, APRN    ipratropium-albuterol (DUO-NEB) nebulizer solution 3 mL, 3 mL, Nebulization, 4x Daily - RT, Edmond Izquierdo MD    isosorbide mononitrate (IMDUR) 24 hr tablet 30 mg, 30 mg, Oral, Daily Before Lunch, Edmond Izquierdo MD    melatonin tablet 10 mg, 10 mg, Oral, Nightly, Edmond Izquierdo MD    metoprolol succinate XL (TOPROL-XL) 24 hr tablet 25 mg, 25 mg, Oral, Daily, Edmond Izquierdo MD    nitroglycerin (NITROSTAT) SL tablet 0.4 mg, 0.4 mg, Sublingual, Q5 Min PRN, Edmond Izquierdo MD    ondansetron ODT (ZOFRAN-ODT) disintegrating tablet 4 mg, 4 mg, Oral, Q6H PRN **OR** ondansetron (ZOFRAN) injection 4 mg, 4 mg, Intravenous,  "Q6H PRN, Edmond Izquierdo MD    pantoprazole (PROTONIX) EC tablet 40 mg, 40 mg, Oral, Daily, Edmond Izquierdo MD    polyethylene glycol (MIRALAX) packet 17 g, 17 g, Oral, Daily PRN, Edmond Izquierdo MD    predniSONE (DELTASONE) tablet 10 mg, 10 mg, Oral, Daily, Gareth Becerra MD    sodium bicarbonate tablet 650 mg, 650 mg, Oral, TID, Edmond Izquierdo MD    sodium chloride 0.9 % bolus 250 mL, 250 mL, Intravenous, PRN, Edmond Izquierdo MD    sodium chloride nasal spray 2 spray, 2 spray, Each Nare, 5x Daily, Edmond Izquierdo MD    sodium chloride nasal spray 2 spray, 2 spray, Each Nare, Continuous PRN, Edmond Izquierdo MD    tamsulosin (FLOMAX) 24 hr capsule 0.4 mg, 0.4 mg, Oral, Nightly, Edmond Izquierdo MD    traZODone (DESYREL) tablet 25 mg, 25 mg, Oral, Nightly PRN, Edmond Izquierdo MD    Data:     Code Status:    There are no questions and answers to display.       Family History   Problem Relation Age of Onset    Hypertension Mother     Leukemia Father        Social History     Socioeconomic History    Marital status: Single   Tobacco Use    Smoking status: Former     Current packs/day: 0.00     Types: Cigarettes     Start date: 10/29/1954     Quit date: 10/29/2003     Years since quittin.9    Smokeless tobacco: Former     Types: Chew     Quit date:    Vaping Use    Vaping status: Never Used   Substance and Sexual Activity    Alcohol use: No    Drug use: No    Sexual activity: Defer       Vitals:  Ht 162.6 cm (64\")   Wt 74.1 kg (163 lb 6.4 oz)   BMI 28.05 kg/m²   T 98.5 P 61 R 19 2 /73 Sp02 95% (4 lpm)          I/O (24Hr):  No intake or output data in the 24 hours ending 10/19/24 0853    Labs and imaging:      No results found for this or any previous visit (from the past 12 hours).                                  Physical Examination:        Physical Exam  Vitals and nursing note reviewed.   Constitutional:       Appearance: He is " well-developed.   HENT:      Head: Normocephalic and atraumatic.      Nose: Nose normal.      Mouth/Throat:      Mouth: Mucous membranes are moist.      Pharynx: Oropharynx is clear.   Eyes:      Pupils: Pupils are equal, round, and reactive to light.      Comments: Left eye enucleated   Cardiovascular:      Rate and Rhythm: Normal rate and regular rhythm.      Heart sounds: Normal heart sounds.   Pulmonary:      Effort: Pulmonary effort is normal.      Breath sounds: Normal breath sounds.   Abdominal:      General: Bowel sounds are normal.      Palpations: Abdomen is soft.   Musculoskeletal:         General: Normal range of motion.      Cervical back: Normal range of motion and neck supple.      Comments: Generalized weakness   Skin:     General: Skin is warm and dry.   Neurological:      Mental Status: He is alert and oriented to person, place, and time.      Deep Tendon Reflexes: Reflexes are normal and symmetric.   Psychiatric:         Behavior: Behavior normal.           Assessment:             Stage 3b chronic kidney disease    A-fib    Former smoker    COPD (chronic obstructive pulmonary disease)    History of radiation therapy    Chronic heart failure with preserved ejection fraction (HFpEF)    Atrial flutter    COVID-19 virus infection    Acute-on-chronic respiratory failure    Epistaxis    Chronic rhinitis    History of pneumonia    Supplemental oxygen dependent    Past Medical History:   Diagnosis Date    Arthritis     Atrial fibrillation with rapid ventricular response 05/31/2019    Blindness of left eye     BPH with obstruction/lower urinary tract symptoms     Cancer     stomach & lung    CHF (congestive heart failure)     CKD (chronic kidney disease) stage 3, GFR 30-59 ml/min     COPD with acute exacerbation 08/03/2024    Coronary artery disease     Elevated cholesterol     Essential hypertension 10/02/2017    Fibrotic lung diseases     GERD (gastroesophageal reflux disease)     Hearing loss      History of transfusion     Hydronephrosis of left kidney     Hyperlipidemia     Hypertension     pt was taken off of all of his medications for BP (atenolol, lisinopril, lasix) because his BP kept bottoming out so his primary dr told him to discontinue them 1-2 months ago (Jan/Feb 2019). pt states he takes no medications currently.    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 Patel is just going to keep an eye on it and not do surgery right now.    Mediastinal adenopathy 10/24/2018    Station 7    Monoclonal gammopathy of unknown significance (MGUS) 09/11/2018    Pancreatic mass     pt states he had this in 2013 but it went away on its own. Now recent CT shows it has come back so he is going to have an ultrasound on 3/13/19.    Shortness of breath         Plan:        Acute on chronic hypoxic respiratory failure  Stage 4 metastatic non small cell lung cancer  COPD  Interstitial lung disease  Chronic diastolic CHF  CKD3  Hyperkalemia  Multifactorial anemia  Anxiety  Hemorrhoids  Constipation    Continue current treatment. Monitor counts. Increase activity. Labs Monday. Continue oxygen weaning as tolerated under direction of pulmonology. Aggressive therapies as tolerated. Maintain patient safety. Adjust bowel regimen as needed.       Electronically signed by MARITZA Remy on 10/19/2024 at 08:53 CDT     I have discussed the care of Karoline Prater, including pertinent history and exam findings, with the nurse practitioner.    I have seen and examined the patient and the key elements of all parts of the encounter have been performed by me.  I agree with the assessment, plan and orders as documented by MARITZA Beltran, after I modified the exam findings and the plan of treatments and the final version is my approved version of the  assessment.        Electronically signed by Edmond Izquierdo MD on 10/19/2024 at 14:23 CDT

## 2024-10-20 PROCEDURE — 63710000001 PREDNISONE PER 5 MG: Performed by: INTERNAL MEDICINE

## 2024-10-21 ENCOUNTER — TELEPHONE (OUTPATIENT)
Dept: HEMATOLOGY | Age: 84
End: 2024-10-21

## 2024-10-21 LAB
ANION GAP SERPL CALCULATED.3IONS-SCNC: 5 MMOL/L (ref 5–15)
BASOPHILS # BLD AUTO: 0.02 10*3/MM3 (ref 0–0.2)
BASOPHILS NFR BLD AUTO: 0.2 % (ref 0–1.5)
BUN SERPL-MCNC: 27 MG/DL (ref 8–23)
BUN/CREAT SERPL: 15.1 (ref 7–25)
CALCIUM SPEC-SCNC: 9.7 MG/DL (ref 8.6–10.5)
CHLORIDE SERPL-SCNC: 108 MMOL/L (ref 98–107)
CO2 SERPL-SCNC: 30 MMOL/L (ref 22–29)
CREAT SERPL-MCNC: 1.79 MG/DL (ref 0.76–1.27)
DEPRECATED RDW RBC AUTO: 69.7 FL (ref 37–54)
EGFRCR SERPLBLD CKD-EPI 2021: 37.4 ML/MIN/1.73
EOSINOPHIL # BLD AUTO: 0.22 10*3/MM3 (ref 0–0.4)
EOSINOPHIL NFR BLD AUTO: 2.4 % (ref 0.3–6.2)
ERYTHROCYTE [DISTWIDTH] IN BLOOD BY AUTOMATED COUNT: 19.5 % (ref 12.3–15.4)
GLUCOSE SERPL-MCNC: 100 MG/DL (ref 65–99)
HCT VFR BLD AUTO: 39.6 % (ref 37.5–51)
HGB BLD-MCNC: 11.3 G/DL (ref 13–17.7)
IMM GRANULOCYTES # BLD AUTO: 0.21 10*3/MM3 (ref 0–0.05)
IMM GRANULOCYTES NFR BLD AUTO: 2.2 % (ref 0–0.5)
LYMPHOCYTES # BLD AUTO: 0.88 10*3/MM3 (ref 0.7–3.1)
LYMPHOCYTES NFR BLD AUTO: 9.4 % (ref 19.6–45.3)
MCH RBC QN AUTO: 27.9 PG (ref 26.6–33)
MCHC RBC AUTO-ENTMCNC: 28.5 G/DL (ref 31.5–35.7)
MCV RBC AUTO: 97.8 FL (ref 79–97)
MONOCYTES # BLD AUTO: 0.64 10*3/MM3 (ref 0.1–0.9)
MONOCYTES NFR BLD AUTO: 6.8 % (ref 5–12)
NEUTROPHILS NFR BLD AUTO: 7.38 10*3/MM3 (ref 1.7–7)
NEUTROPHILS NFR BLD AUTO: 79 % (ref 42.7–76)
PLATELET # BLD AUTO: 118 10*3/MM3 (ref 140–450)
PMV BLD AUTO: 11.6 FL (ref 6–12)
POTASSIUM SERPL-SCNC: 5.1 MMOL/L (ref 3.5–5.2)
RBC # BLD AUTO: 4.05 10*6/MM3 (ref 4.14–5.8)
SODIUM SERPL-SCNC: 143 MMOL/L (ref 136–145)
WBC NRBC COR # BLD AUTO: 9.35 10*3/MM3 (ref 3.4–10.8)

## 2024-10-21 PROCEDURE — 63710000001 DIPHENHYDRAMINE PER 50 MG: Performed by: INTERNAL MEDICINE

## 2024-10-21 PROCEDURE — 99232 SBSQ HOSP IP/OBS MODERATE 35: CPT | Performed by: INTERNAL MEDICINE

## 2024-10-21 PROCEDURE — 97535 SELF CARE MNGMENT TRAINING: CPT

## 2024-10-21 PROCEDURE — 80048 BASIC METABOLIC PNL TOTAL CA: CPT | Performed by: INTERNAL MEDICINE

## 2024-10-21 PROCEDURE — 97530 THERAPEUTIC ACTIVITIES: CPT

## 2024-10-21 PROCEDURE — 63710000001 PREDNISONE PER 5 MG: Performed by: INTERNAL MEDICINE

## 2024-10-21 PROCEDURE — 85025 COMPLETE CBC W/AUTO DIFF WBC: CPT | Performed by: INTERNAL MEDICINE

## 2024-10-21 PROCEDURE — 97116 GAIT TRAINING THERAPY: CPT

## 2024-10-21 NOTE — PROGRESS NOTES
ROBSON Borjas APRN        Internal Medicine Progress Note    10/20/2024   19:30 CDT    Name:  Karoline Prater  MRN:    [id number]     Acct:     [id number]   Room:  [address] [place] [address] Day: 0     Admit Date: 10/7/2024  4:53 PM  PCP: Irving Alexis MD    Subjective:     C/C: weakness, shortness of breath    Interval History: Status:  improved. Resting in bed, Currently sleeping. O2 sats stable on 4L NC. Afebrile. Staff report no new concerns.  No new labs to review.     Review of Systems   Constitutional: Positive for malaise/fatigue. Negative for chills, decreased appetite, weight gain and weight loss.   HENT:  Negative for congestion, ear discharge, hoarse voice and tinnitus.    Eyes:  Negative for blurred vision, discharge, visual disturbance and visual halos.   Cardiovascular:  Positive for dyspnea on exertion. Negative for chest pain, claudication, irregular heartbeat, leg swelling, orthopnea and paroxysmal nocturnal dyspnea.   Respiratory:  Positive for shortness of breath. Negative for cough, sputum production and wheezing.    Endocrine: Negative for cold intolerance, heat intolerance and polyuria.   Hematologic/Lymphatic: Negative for adenopathy. Does not bruise/bleed easily.   Skin:  Negative for dry skin, itching and suspicious lesions.   Musculoskeletal:  Negative for arthritis, back pain, falls, joint pain, muscle weakness and myalgias.   Gastrointestinal:  Negative for abdominal pain, constipation, diarrhea, dysphagia and hematemesis.   Genitourinary:  Negative for bladder incontinence, dysuria and frequency.   Neurological:  Positive for weakness. Negative for aphonia, disturbances in coordination and dizziness.   Psychiatric/Behavioral:  Negative for altered mental status, depression, memory loss and substance abuse. The patient does not have insomnia and is not nervous/anxious.          Medications:     Allergies:   Allergies   Allergen Reactions     Penicillins Hives       Current Meds:   Current Facility-Administered Medications:     acetaminophen (TYLENOL) tablet 1,000 mg, 1,000 mg, Oral, Nightly, Edmond Izquierdo MD    aspirin EC tablet 81 mg, 81 mg, Oral, Daily, Edmond Izquierdo MD    atorvastatin (LIPITOR) tablet 20 mg, 20 mg, Oral, Nightly, Edmond Izquierdo MD    bisacodyl (DULCOLAX) EC tablet 5 mg, 5 mg, Oral, Daily, Sil Jensen APRN    bisacodyl (DULCOLAX) suppository 10 mg, 10 mg, Rectal, Daily PRN, Edmond Izquierdo MD    budesonide (PULMICORT) nebulizer solution 0.5 mg, 0.5 mg, Nebulization, BID - RT, Tarik Crocker MD    busPIRone (BUSPAR) tablet 5 mg, 5 mg, Oral, TID With Meals, Edmond Izquierdo MD    cetirizine (zyrTEC) tablet 10 mg, 10 mg, Oral, Daily, Edmond Izquierdo MD    diphenhydrAMINE (BENADRYL) capsule 25 mg, 25 mg, Oral, Nightly PRN, Edmond Izquierdo MD    empagliflozin (JARDIANCE) tablet 10 mg, 10 mg, Oral, Daily, Edmond Izquierdo MD    guaiFENesin (MUCINEX) 12 hr tablet 600 mg, 600 mg, Oral, Q12H, Edmond Izquierdo MD    HYDROcodone-acetaminophen (NORCO) 5-325 MG per tablet 1 tablet, 1 tablet, Oral, TID, Edmond Izquierdo MD    hydrocortisone (ANUSOL-HC) suppository 25 mg, 25 mg, Rectal, Q12H PRN, Edmond Izquierdo MD    ipratropium-albuterol (DUO-NEB) nebulizer solution 3 mL, 3 mL, Nebulization, 4x Daily - RT, Edmond Izquierdo MD    isosorbide mononitrate (IMDUR) 24 hr tablet 30 mg, 30 mg, Oral, Daily Before Lunch, Edmond Izquierdo MD    melatonin tablet 10 mg, 10 mg, Oral, Nightly, Edmond Izquierdo MD    metoprolol succinate XL (TOPROL-XL) 24 hr tablet 25 mg, 25 mg, Oral, Daily, Edmond Izquierdo MD    nitroglycerin (NITROSTAT) SL tablet 0.4 mg, 0.4 mg, Sublingual, Q5 Min PRN, Edmond Izquierdo MD    ondansetron ODT (ZOFRAN-ODT) disintegrating tablet 4 mg, 4 mg, Oral, Q6H PRN **OR** ondansetron (ZOFRAN) injection 4 mg, 4 mg,  "Intravenous, Q6H PRN, Edmond Izquierdo MD    pantoprazole (PROTONIX) EC tablet 40 mg, 40 mg, Oral, Daily, Edmond Izquierdo MD    polyethylene glycol (MIRALAX) packet 17 g, 17 g, Oral, Daily PRN, Edmond Izquierdo MD    predniSONE (DELTASONE) tablet 10 mg, 10 mg, Oral, Daily, Gareth Becerra MD    sodium bicarbonate tablet 650 mg, 650 mg, Oral, TID, Edmond Izquierdo MD    sodium chloride 0.9 % bolus 250 mL, 250 mL, Intravenous, PRN, Edmond Izquierdo MD    sodium chloride nasal spray 2 spray, 2 spray, Each Nare, 5x Daily, Edmond Izquierdo MD    sodium chloride nasal spray 2 spray, 2 spray, Each Nare, Continuous PRN, Edmond Izquierdo MD    tamsulosin (FLOMAX) 24 hr capsule 0.4 mg, 0.4 mg, Oral, Nightly, Edmond Izquierdo MD    traZODone (DESYREL) tablet 25 mg, 25 mg, Oral, Nightly PRN, Edmond Izquierdo MD    Data:     Code Status:    There are no questions and answers to display.       Family History   Problem Relation Age of Onset    Hypertension Mother     Leukemia Father        Social History     Socioeconomic History    Marital status: Single   Tobacco Use    Smoking status: Former     Current packs/day: 0.00     Types: Cigarettes     Start date: 10/29/1954     Quit date: 10/29/2003     Years since quittin.9    Smokeless tobacco: Former     Types: Chew     Quit date:    Vaping Use    Vaping status: Never Used   Substance and Sexual Activity    Alcohol use: No    Drug use: No    Sexual activity: Defer       Vitals:  Ht 162.6 cm (64\")   Wt 74.1 kg (163 lb 6.4 oz)   BMI 28.05 kg/m²   T 98.4 P 84 R 12 2 /62 Sp02 95% (4 lpm)          I/O (24Hr):  No intake or output data in the 24 hours ending 10/20/24 1930    Labs and imaging:      No results found for this or any previous visit (from the past 12 hours).                                  Physical Examination:        Physical Exam  Vitals and nursing note reviewed.   Constitutional:       " Appearance: He is well-developed.   HENT:      Head: Normocephalic and atraumatic.      Nose: Nose normal.      Mouth/Throat:      Mouth: Mucous membranes are moist.      Pharynx: Oropharynx is clear.   Eyes:      Pupils: Pupils are equal, round, and reactive to light.      Comments: Left eye enucleated   Cardiovascular:      Rate and Rhythm: Normal rate and regular rhythm.      Heart sounds: Normal heart sounds.   Pulmonary:      Effort: Pulmonary effort is normal.      Breath sounds: Normal breath sounds.   Abdominal:      General: Bowel sounds are normal.      Palpations: Abdomen is soft.   Musculoskeletal:         General: Normal range of motion.      Cervical back: Normal range of motion and neck supple.      Comments: Generalized weakness   Skin:     General: Skin is warm and dry.   Neurological:      Mental Status: He is alert and oriented to person, place, and time.      Deep Tendon Reflexes: Reflexes are normal and symmetric.   Psychiatric:         Behavior: Behavior normal.           Assessment:             Stage 3b chronic kidney disease    A-fib    Former smoker    COPD (chronic obstructive pulmonary disease)    History of radiation therapy    Chronic heart failure with preserved ejection fraction (HFpEF)    Atrial flutter    COVID-19 virus infection    Acute-on-chronic respiratory failure    Epistaxis    Chronic rhinitis    History of pneumonia    Supplemental oxygen dependent    Past Medical History:   Diagnosis Date    Arthritis     Atrial fibrillation with rapid ventricular response 05/31/2019    Blindness of left eye     BPH with obstruction/lower urinary tract symptoms     Cancer     stomach & lung    CHF (congestive heart failure)     CKD (chronic kidney disease) stage 3, GFR 30-59 ml/min     COPD with acute exacerbation 08/03/2024    Coronary artery disease     Elevated cholesterol     Essential hypertension 10/02/2017    Fibrotic lung diseases     GERD (gastroesophageal reflux disease)      Hearing loss     History of transfusion     Hydronephrosis of left kidney     Hyperlipidemia     Hypertension     pt was taken off of all of his medications for BP (atenolol, lisinopril, lasix) because his BP kept bottoming out so his primary dr told him to discontinue them 1-2 months ago (Jan/Feb 2019). pt states he takes no medications currently.    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 Patel is just going to keep an eye on it and not do surgery right now.    Mediastinal adenopathy 10/24/2018    Station 7    Monoclonal gammopathy of unknown significance (MGUS) 09/11/2018    Pancreatic mass     pt states he had this in 2013 but it went away on its own. Now recent CT shows it has come back so he is going to have an ultrasound on 3/13/19.    Shortness of breath         Plan:        Acute on chronic hypoxic respiratory failure  Stage 4 metastatic non small cell lung cancer  COPD  Interstitial lung disease  Chronic diastolic CHF  CKD3  Hyperkalemia  Multifactorial anemia  Anxiety  Hemorrhoids  Constipation    Continue current treatment. Monitor counts. Increase activity. Labs Monday. Continue oxygen weaning as tolerated under direction of pulmonology. Aggressive therapies as tolerated. Maintain patient safety. Adjust bowel regimen as needed.       Electronically signed by MARITZA Remy on 10/20/2024 at 19:30 CDT

## 2024-10-21 NOTE — PROGRESS NOTES
Progress Note    Patient name: Karoline Prater  Patient : 1940  VISIT # [id number]  MR #[id number]  Room:     Subjective     Mr. Prater rested well over the weekend, anxious to get going again with physical therapy.  He has eaten the entirety of his breakfast this morning.    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   CKD and chemotherapy associated ANEMIA, previously on Procrit, currently on hold having completed chemotherapy with further improvement in hemoglobin.  Pancreatic mass diagnosed 10/29/03 negative on open biopsy  BPH on Flomax  Orthostatic hypotension medications managed by Dr. Reuben Vásquez completed 4 cycles of combination chemotherapy with carboplatin, Keytruda, Alimta on 2019.    Maintenance Keytruda and Alimta every 3 weeks was delivered.    The final cycle number #25 of Keytruda/Alimta was delivered on 10/29/2020.     Treatment was held since that time due to issues of dyspnea and shortness of breath, requiring steroids.      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      Mr. Prater has had several hospitalizations and exacerbations of COPD and complications with pulmonary fibrosis in addition to the question of possible progressive disease including possible lymphangitic spread.       He is currently, 10/17/2024, on LTAC  I have been asked to see him for comment and opinion regarding recent imaging findings.  Below are findings and interactions I have had and monitoring Mr. Prater's history of lung cancer.     I discussed imaging  "with Dr Cool on 7/3/2024 regarding the June 2024 imaging.  Dr. Cool was gracious enough to review imaging studies with the following assessment by Dr. Danny oCol:  \"I have reviewed the CT scan of the thorax on this admission for atrial fibrillation with rapid ventricular response.     He completed recent SBRT radiotherapy for 2 right lung nodules on February 7, 2024.     Review of the current CT scan appears consistent with postradiation change.  We cannot entirely rule out progression however this appears unlikely with the chronology and radiographic appearance.     He is scheduled to return to our department in approximately 1 month with follow-up CT scan of the thorax and we will follow this closely with serial CT scans of the thorax.\"           CT scan of the chest without contrast on 9/21/2024 with addendum report is as follows:  Spiculated masslike opacity of the right upper lobe, similar to only minimally decreased compared to 8/3/2024, increased compared to 6/29/2024. Neoplastic process must be considered.   Stable scarring suspected of the right lung apex. Nonspecific irregular 2 x 1 cm nodular focus right middle lobe for which continued imaging surveillance recommended.   Advanced chronic interstitial lung disease with bronchiectasis.   No pneumothorax. Diffusely irregular right pleural thickening remains stable.     This report was signed and finalized on 9/21/2024 1:13 PM by Dr. Betsey Wells MD.     ADDENDUM: Request from Dr. Michele's office for additional comparison  with the earlier imaging examinations, comparison is made with PET/CT  8/20/2024, CT chest without contrast 8/3/2024, CT chest without contrast  6/29/2024, CT chest without contrast 5/6/2024.     Measurements of the spiculated mass in the right upper lobe:     CT chest without contrast 5/6/2024: Approximately 4.0 x 2.2 cm     CT chest without contrast 6/29/202, Approximately 4.7 x 2.1 cm     CT chest without contrast 8/3/2024: " Approximately 4.9 x 4.2 cm     PET/CT 8/20/2024: Approximately 3.6 x 1.8 cm.     CT chest without contrast 9/21/2024: Approximately 4.2 x 3.6 cm.     The mass was slowly increasing in size from May, 2024 through August 3,  2024, then it was smaller on the PET/CT from 8/20/2024, which could be a  positive treatment response. The mass then increased in size on  9/21/2024 and that measurement includes more confluent soft tissue  attenuation against the lateral pleural surface, which could be due to  increased volume of tumor tissue, or confluent surrounding infiltrate or  progressive fibrosis. A repeat PET/CT could help determine if the  increased size and volume of the mass is related to avid tumor or due to  an increase in surrounding infiltrate and fibrosis.     This report was signed and finalized on 10/7/2024 3:44 PM by Dr. Allen Churchill MD.             [facility] chest at [facility] on 10/3/2024 reported the following:  Similar masslike spiculated opacity at the RIGHT upper lobe. Similar interlobular septal thickening with increased patchy opacities especially in the RIGHT lung. Appearance highly concerning for malignancy with lymphangitic spread of tumor.   Similar mild adenopathy.  Coronary artery calcifications with metallic device in the RIGHT  ventricular apex, favor wireless pulse generator.  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             I agree with the radiologist's interpretation that the actual tumor mass within the radiation therapy field is similar to previous imaging.  I also agree that there are parenchymal interstitial changes that come and go, sometimes are worse sometimes are diminished with treatment.     In discussion with Dr. Gordon, Mr. Prater appears to be improving since on LTAC anticipating likely improvement in the imaging of the interstitial lung disease problems.      REVIEW OF SYSTEMS:   History obtained from chart review and the patient  General: positive for   - fatigue   ENT: negative for - oral lesions  Allergy and Immunology: negative   Hematological and Lymphatic:negative for - weight loss  Respiratory:  Baseline dyspnea at rest, with nasal cannula present but decreased compared to admission to LTAC.  Some dyspnea on exertion but able to walk across the room with assistance and oxygen supplementation   Cardiovascular: negative for - chest pain or palpitations  Gastrointestinal: positive for - appetite loss and diarrhea  Genito-Urinary: negative for - dysuria or hematuria  Musculoskeletal: positive for - generalized weakness  Neurological: negative for - confusion, dizziness or headaches  Dermatological: negative for - pruritus and rash    Objective     Vital Signs     No intake or output data in the 24 hours ending 10/21/24 0740    PHYSICAL EXAM:  General Appearance: Alert, cooperative, in no acute distress  Head: Normocephalic, without obvious abnormality, atraumatic  Eyes: Normal conjunctivae and sclerae, anicteric  Ears: Ears appear intact with no abnormalities noted, hearing grossly normal  Throat: No oral lesions, no thrush, oral mucosa moist  Neck: No adenopathy, supple, trachea midline, no JVD  Lungs: Scattered diffuse rales at bases, but without wheezing and with fairly clear breath sounds anteriorly   Heart: Regular rhythm and normal rate, no murmurs, gallops or rubs  Abdomen: Normal bowel sounds, no masses, no organomegaly, soft non-tender, non-distended  Genitalia: Deferred  Extremities: Moves all extremities equally well, no edema, no cyanosis, no redness  Skin: No bleeding, bruising or rash  Lymph nodes: No palpable adenopathy  Neurologic: Grossly intact though not formally tested        CBC  Results from last 7 days   Lab Units 10/21/24  0448 10/17/24  0511   WBC 10*3/mm3 9.35 9.05   HEMOGLOBIN g/dL 11.3* 11.9*   HEMATOCRIT % 39.6 40.2   PLATELETS 10*3/mm3 118* 157       CMP  Lab Results   Component Value Date    GLUCOSE 100 (H) 10/21/2024    BUN 27 (H)  "10/21/2024    CREATININE 1.79 (H) 10/21/2024    EGFRIFNONA 29 (L) 08/27/2021    BCR 15.1 10/21/2024    CO2 30.0 (H) 10/21/2024    CALCIUM 9.7 10/21/2024    ALBUMIN 2.7 (L) 10/09/2024    AST 18 10/09/2024    ALT 24 10/09/2024             No results found for: \"BLOODCX\", \"URINECX\", \"WOUNDCX\", \"MRSACX\", \"RESPCX\", \"STOOLCX\"    Imaging Results (Last 72 Hours)       ** No results found for the last 72 hours. 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   CKD and chemotherapy associated ANEMIA, previously on Procrit, currently on hold having completed chemotherapy with further improvement in hemoglobin.  Pancreatic mass diagnosed 10/29/03 negative on open biopsy  BPH on Flomax  Orthostatic hypotension medications managed by Dr. Reuben Vásquez completed 4 cycles of combination chemotherapy with carboplatin, Keytruda, Alimta on 7/5/2019.    Maintenance Keytruda and Alimta every 3 weeks was delivered.    The final cycle number #25 of Keytruda/Alimta was delivered on 10/29/2020.     Treatment was held since that time due to issues of dyspnea and shortness of breath, requiring steroids.      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      Mr." "Moi has had several hospitalizations and exacerbations of COPD and complications with pulmonary fibrosis in addition to the question of possible progressive disease including possible lymphangitic spread.       He is currently, 10/17/2024, on LTAC  I have been asked to see him for comment and opinion regarding recent imaging findings.  Below are findings and interactions I have had and monitoring Mr. Prater's history of lung cancer.     I discussed imaging with Dr Cool on 7/3/2024 regarding the June 2024 imaging.  Dr. Cool was gracious enough to review imaging studies with the following assessment by Dr. Danny Cool:  \"I have reviewed the CT scan of the thorax on this admission for atrial fibrillation with rapid ventricular response.     He completed recent SBRT radiotherapy for 2 right lung nodules on February 7, 2024.     Review of the current CT scan appears consistent with postradiation change.  We cannot entirely rule out progression however this appears unlikely with the chronology and radiographic appearance.     He is scheduled to return to our department in approximately 1 month with follow-up CT scan of the thorax and we will follow this closely with serial CT scans of the thorax.\"           CT scan of the chest without contrast on 9/21/2024 with addendum report is as follows:  Spiculated masslike opacity of the right upper lobe, similar to only minimally decreased compared to 8/3/2024, increased compared to 6/29/2024. Neoplastic process must be considered.   Stable scarring suspected of the right lung apex. Nonspecific irregular 2 x 1 cm nodular focus right middle lobe for which continued imaging surveillance recommended.   Advanced chronic interstitial lung disease with bronchiectasis.   No pneumothorax. Diffusely irregular right pleural thickening remains stable.     This report was signed and finalized on 9/21/2024 1:13 PM by Dr. Betsey Wells MD.     ADDENDUM: Request from Dr. Michele's " office for additional comparison  with the earlier imaging examinations, comparison is made with PET/CT  8/20/2024, CT chest without contrast 8/3/2024, CT chest without contrast  6/29/2024, CT chest without contrast 5/6/2024.     Measurements of the spiculated mass in the right upper lobe:     CT chest without contrast 5/6/2024: Approximately 4.0 x 2.2 cm     CT chest without contrast 6/29/202, Approximately 4.7 x 2.1 cm     CT chest without contrast 8/3/2024: Approximately 4.9 x 4.2 cm     PET/CT 8/20/2024: Approximately 3.6 x 1.8 cm.     CT chest without contrast 9/21/2024: Approximately 4.2 x 3.6 cm.     The mass was slowly increasing in size from May, 2024 through August 3,  2024, then it was smaller on the PET/CT from 8/20/2024, which could be a  positive treatment response. The mass then increased in size on  9/21/2024 and that measurement includes more confluent soft tissue  attenuation against the lateral pleural surface, which could be due to  increased volume of tumor tissue, or confluent surrounding infiltrate or  progressive fibrosis. A repeat PET/CT could help determine if the  increased size and volume of the mass is related to avid tumor or due to  an increase in surrounding infiltrate and fibrosis.     This report was signed and finalized on 10/7/2024 3:44 PM by Dr. Allen Churchill MD.             CTA chest at [facility] on 10/3/2024 reported the following:  Similar masslike spiculated opacity at the RIGHT upper lobe. Similar interlobular septal thickening with increased patchy opacities especially in the RIGHT lung. Appearance highly concerning for malignancy with lymphangitic spread of tumor.   Similar mild adenopathy.  Coronary artery calcifications with metallic device in the RIGHT  ventricular apex, favor wireless pulse generator.  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             I agree with the radiologist's interpretation that the actual tumor mass within the  radiation therapy field is similar to previous imaging.  I also agree that there are parenchymal interstitial changes that come and go, sometimes are worse sometimes are diminished with treatment.     In discussion with Dr. Gordon, Mr. Prater appears to be improving since on LTAC anticipating likely improvement in the imaging of the interstitial lung disease problems.    RECOMMENDATIONS:  Repeat CT scan of the chest in 2 to 3 weeks for comparison.  May even consider PET scan if CT scan looks better.  Hopefully we will be able to continue on a chemotherapy holiday without systemic intervention.  Mr. Prater has not been in favor of further systemic therapy over the last couple years and indeed chances of complications would increase given his marginal pulmonary reserve.  So far I do not see obvious evidence of progression.     Mr. Prater was encouraged with this assessment.    Mr. Prater is looking forward to advancing in activity and exercise with physical therapy this week      Jeff Michele MD  10/21/24  07:40 CDT

## 2024-10-21 NOTE — TELEPHONE ENCOUNTER
Called patient to remind them of their appointment on 10/24/2024 but was unable to leave vm due to mailbox being full or not set up or memory is full.  Called pt to remind them of their appt on 10/24/2024 but was unable to leave message or speak to the patient due to invalid number or number was disconnected.

## 2024-10-21 NOTE — PROGRESS NOTES
ROBSON Borjas APRN        Internal Medicine Progress Note    10/21/2024   09:50 CDT    Name:  Karoline Prater  MRN:    [id number]     Acct:     [id number]   Room:  [address] [place] [address] Day: 0     Admit Date: 10/7/2024  4:53 PM  PCP: Irving Alexis MD    Subjective:     C/C: weakness, shortness of breath    Interval History: Status:  improved. Up to chair. No family at bedside. Afebrile. Maintaining adequate 02 sats with 02 at 4 lpm at rest. Requiring up to 6-7 lpm with activity. Denies pain. Progressing with therapies. Tolerating treatment thus far. Counts stable.     Review of Systems   Constitutional: Positive for malaise/fatigue. Negative for chills, decreased appetite, weight gain and weight loss.   HENT:  Negative for congestion, ear discharge, hoarse voice and tinnitus.    Eyes:  Negative for blurred vision, discharge, visual disturbance and visual halos.   Cardiovascular:  Positive for dyspnea on exertion. Negative for chest pain, claudication, irregular heartbeat, leg swelling, orthopnea and paroxysmal nocturnal dyspnea.   Respiratory:  Positive for shortness of breath. Negative for cough, sputum production and wheezing.    Endocrine: Negative for cold intolerance, heat intolerance and polyuria.   Hematologic/Lymphatic: Negative for adenopathy. Does not bruise/bleed easily.   Skin:  Negative for dry skin, itching and suspicious lesions.   Musculoskeletal:  Negative for arthritis, back pain, falls, joint pain, muscle weakness and myalgias.   Gastrointestinal:  Negative for abdominal pain, constipation, diarrhea, dysphagia and hematemesis.   Genitourinary:  Negative for bladder incontinence, dysuria and frequency.   Neurological:  Positive for weakness. Negative for aphonia, disturbances in coordination and dizziness.   Psychiatric/Behavioral:  Negative for altered mental status, depression, memory loss and substance abuse. The patient does not have insomnia and is  not nervous/anxious.          Medications:     Allergies:   Allergies   Allergen Reactions    Penicillins Hives       Current Meds:   Current Facility-Administered Medications:     acetaminophen (TYLENOL) tablet 1,000 mg, 1,000 mg, Oral, Nightly, Edmond Izquierdo MD    aspirin EC tablet 81 mg, 81 mg, Oral, Daily, Edmond Izquierdo MD    atorvastatin (LIPITOR) tablet 20 mg, 20 mg, Oral, Nightly, Edmond Izquierdo MD    bisacodyl (DULCOLAX) EC tablet 5 mg, 5 mg, Oral, Daily, Sil Jensen APRN    bisacodyl (DULCOLAX) suppository 10 mg, 10 mg, Rectal, Daily PRN, Edmond Izquierdo MD    budesonide (PULMICORT) nebulizer solution 0.5 mg, 0.5 mg, Nebulization, BID - RT, Tarik Crocker MD    busPIRone (BUSPAR) tablet 5 mg, 5 mg, Oral, TID With Meals, Edmond Izquierdo MD    cetirizine (zyrTEC) tablet 10 mg, 10 mg, Oral, Daily, Edmond Izquierdo MD    diphenhydrAMINE (BENADRYL) capsule 25 mg, 25 mg, Oral, Nightly PRN, Edmond Izquierdo MD    empagliflozin (JARDIANCE) tablet 10 mg, 10 mg, Oral, Daily, Edmond Izquierdo MD    guaiFENesin (MUCINEX) 12 hr tablet 600 mg, 600 mg, Oral, Q12H, Edmond Izquierdo MD    HYDROcodone-acetaminophen (NORCO) 5-325 MG per tablet 1 tablet, 1 tablet, Oral, TID, Edmond Izquierdo MD    hydrocortisone (ANUSOL-HC) suppository 25 mg, 25 mg, Rectal, Q12H PRN, Edmond Izquierdo MD    ipratropium-albuterol (DUO-NEB) nebulizer solution 3 mL, 3 mL, Nebulization, 4x Daily - RT, Edmond Izquierdo MD    isosorbide mononitrate (IMDUR) 24 hr tablet 30 mg, 30 mg, Oral, Daily Before Lunch, Edmond Izquierdo MD    melatonin tablet 10 mg, 10 mg, Oral, Nightly, Edmond Izquierdo MD    metoprolol succinate XL (TOPROL-XL) 24 hr tablet 25 mg, 25 mg, Oral, Daily, Edmond Izquierdo MD    nitroglycerin (NITROSTAT) SL tablet 0.4 mg, 0.4 mg, Sublingual, Q5 Min PRN, Edmond Izquierdo MD    ondansetron ODT (ZOFRAN-ODT)  "disintegrating tablet 4 mg, 4 mg, Oral, Q6H PRN **OR** ondansetron (ZOFRAN) injection 4 mg, 4 mg, Intravenous, Q6H PRN, Edmond Izquierdo MD    pantoprazole (PROTONIX) EC tablet 40 mg, 40 mg, Oral, Daily, Edmond Izquierdo MD    polyethylene glycol (MIRALAX) packet 17 g, 17 g, Oral, Daily PRN, Edmond Izquierdo MD    predniSONE (DELTASONE) tablet 10 mg, 10 mg, Oral, Daily, Gareth Becerra MD    sodium bicarbonate tablet 650 mg, 650 mg, Oral, TID, Edmond Izquierdo MD    sodium chloride 0.9 % bolus 250 mL, 250 mL, Intravenous, PRN, Edmond Izquierdo MD    sodium chloride nasal spray 2 spray, 2 spray, Each Nare, 5x Daily, Edmond Izquierdo MD    sodium chloride nasal spray 2 spray, 2 spray, Each Nare, Continuous PRN, Edmond Izquierdo MD    tamsulosin (FLOMAX) 24 hr capsule 0.4 mg, 0.4 mg, Oral, Nightly, Edmond Izquierdo MD    traZODone (DESYREL) tablet 25 mg, 25 mg, Oral, Nightly PRN, Edmond Izquierdo MD    Data:     Code Status:    There are no questions and answers to display.       Family History   Problem Relation Age of Onset    Hypertension Mother     Leukemia Father        Social History     Socioeconomic History    Marital status: Single   Tobacco Use    Smoking status: Former     Current packs/day: 0.00     Types: Cigarettes     Start date: 10/29/1954     Quit date: 10/29/2003     Years since quittin.9    Smokeless tobacco: Former     Types: Chew     Quit date:    Vaping Use    Vaping status: Never Used   Substance and Sexual Activity    Alcohol use: No    Drug use: No    Sexual activity: Defer       Vitals:  Ht 162.6 cm (64\")   Wt 74 kg (163 lb 1.6 oz)   BMI 28.00 kg/m²   T 98.1 P 61 R 19  /77 Sp02 98% (4 lpm)          I/O (24Hr):  No intake or output data in the 24 hours ending 10/21/24 0950    Labs and imaging:      Recent Results (from the past 12 hours)   Basic Metabolic Panel    Collection Time: 10/21/24  4:48 AM    Specimen: Blood "   Result Value Ref Range    Glucose 100 (H) 65 - 99 mg/dL    BUN 27 (H) 8 - 23 mg/dL    Creatinine 1.79 (H) 0.76 - 1.27 mg/dL    Sodium 143 136 - 145 mmol/L    Potassium 5.1 3.5 - 5.2 mmol/L    Chloride 108 (H) 98 - 107 mmol/L    CO2 30.0 (H) 22.0 - 29.0 mmol/L    Calcium 9.7 8.6 - 10.5 mg/dL    BUN/Creatinine Ratio 15.1 7.0 - 25.0    Anion Gap 5.0 5.0 - 15.0 mmol/L    eGFR 37.4 (L) >60.0 mL/min/1.73   CBC Auto Differential    Collection Time: 10/21/24  4:48 AM    Specimen: Blood   Result Value Ref Range    WBC 9.35 3.40 - 10.80 10*3/mm3    RBC 4.05 (L) 4.14 - 5.80 10*6/mm3    Hemoglobin 11.3 (L) 13.0 - 17.7 g/dL    Hematocrit 39.6 37.5 - 51.0 %    MCV 97.8 (H) 79.0 - 97.0 fL    MCH 27.9 26.6 - 33.0 pg    MCHC 28.5 (L) 31.5 - 35.7 g/dL    RDW 19.5 (H) 12.3 - 15.4 %    RDW-SD 69.7 (H) 37.0 - 54.0 fl    MPV 11.6 6.0 - 12.0 fL    Platelets 118 (L) 140 - 450 10*3/mm3    Neutrophil % 79.0 (H) 42.7 - 76.0 %    Lymphocyte % 9.4 (L) 19.6 - 45.3 %    Monocyte % 6.8 5.0 - 12.0 %    Eosinophil % 2.4 0.3 - 6.2 %    Basophil % 0.2 0.0 - 1.5 %    Immature Grans % 2.2 (H) 0.0 - 0.5 %    Neutrophils, Absolute 7.38 (H) 1.70 - 7.00 10*3/mm3    Lymphocytes, Absolute 0.88 0.70 - 3.10 10*3/mm3    Monocytes, Absolute 0.64 0.10 - 0.90 10*3/mm3    Eosinophils, Absolute 0.22 0.00 - 0.40 10*3/mm3    Basophils, Absolute 0.02 0.00 - 0.20 10*3/mm3    Immature Grans, Absolute 0.21 (H) 0.00 - 0.05 10*3/mm3                                     Physical Examination:        Physical Exam  Vitals and nursing note reviewed.   Constitutional:       Appearance: He is well-developed.   HENT:      Head: Normocephalic and atraumatic.      Nose: Nose normal.      Mouth/Throat:      Mouth: Mucous membranes are moist.      Pharynx: Oropharynx is clear.   Eyes:      Pupils: Pupils are equal, round, and reactive to light.      Comments: Left eye enucleated   Cardiovascular:      Rate and Rhythm: Normal rate and regular rhythm.      Heart sounds: Normal heart  sounds.   Pulmonary:      Effort: Pulmonary effort is normal.      Breath sounds: Normal breath sounds.   Abdominal:      General: Bowel sounds are normal.      Palpations: Abdomen is soft.   Musculoskeletal:         General: Normal range of motion.      Cervical back: Normal range of motion and neck supple.      Comments: Generalized weakness   Skin:     General: Skin is warm and dry.   Neurological:      Mental Status: He is alert and oriented to person, place, and time.      Deep Tendon Reflexes: Reflexes are normal and symmetric.   Psychiatric:         Behavior: Behavior normal.           Assessment:             Stage 3b chronic kidney disease    A-fib    Former smoker    COPD (chronic obstructive pulmonary disease)    History of radiation therapy    Chronic heart failure with preserved ejection fraction (HFpEF)    Atrial flutter    COVID-19 virus infection    Acute-on-chronic respiratory failure    Epistaxis    Chronic rhinitis    History of pneumonia    Supplemental oxygen dependent    Past Medical History:   Diagnosis Date    Arthritis     Atrial fibrillation with rapid ventricular response 05/31/2019    Blindness of left eye     BPH with obstruction/lower urinary tract symptoms     Cancer     stomach & lung    CHF (congestive heart failure)     CKD (chronic kidney disease) stage 3, GFR 30-59 ml/min     COPD with acute exacerbation 08/03/2024    Coronary artery disease     Elevated cholesterol     Essential hypertension 10/02/2017    Fibrotic lung diseases     GERD (gastroesophageal reflux disease)     Hearing loss     History of transfusion     Hydronephrosis of left kidney     Hyperlipidemia     Hypertension     pt was taken off of all of his medications for BP (atenolol, lisinopril, lasix) because his BP kept bottoming out so his primary dr told him to discontinue them 1-2 months ago (Jan/Feb 2019). pt states he takes no medications currently.    Lung cancer     Mass of duodenum versus letty hepatis   04/27/2019    Mass of left renal hilum  04/27/2019    Mass of upper lobe of right lung 02/2019    mass is shrinking on its own, so pt states Dr. Patel is just going to keep an eye on it and not do surgery right now.    Mediastinal adenopathy 10/24/2018    [facility]    Monoclonal gammopathy of unknown significance (MGUS) 09/11/2018    Pancreatic mass     pt states he had this in 2013 but it went away on its own. Now recent CT shows it has come back so he is going to have an ultrasound on 3/13/19.    Shortness of breath         Plan:        Acute on chronic hypoxic respiratory failure  Stage 4 metastatic non small cell lung cancer  COPD  Interstitial lung disease  Chronic diastolic CHF  CKD3  Hyperkalemia  Multifactorial anemia  Anxiety  Hemorrhoids  Constipation    Continue current treatment. Monitor counts. Increase activity. Labs Thursday. Continue oxygen weaning as tolerated under direction of pulmonology. Aggressive therapies as tolerated. Maintain patient safety. Adjust bowel regimen as needed.       Electronically signed by MARITZA Greenwood on 10/21/2024 at 09:50 CDT     I have discussed the care of Karoline Prater, including pertinent history and exam findings, with the nurse practitioner.    I have seen and examined the patient and the key elements of all parts of the encounter have been performed by me.  I agree with the assessment, plan and orders as documented by MARITZA Frias, after I modified the exam findings and the plan of treatments and the final version is my approved version of the assessment.        Electronically signed by Edmond Izquierdo MD on 10/21/2024 at 16:02 CDT

## 2024-10-21 NOTE — PROGRESS NOTES
[facility] PULMONARY AND CRITICAL CARE PROGRESS NOTE - [facility]    Patient: Karoline Prater  1940   MR# [id number]   Acct# [id number]  10/21/24   12:57 CDT  Referring Provider: Edmond Izquierdo MD    Chief Complaint: Shortness of breath, acute on chronic respiratory failure, COPD, oxygen dependent, history of COVID infection the past, need for rehab  Interval history: Patient was seen in the follow-up visit in pulmonary rounds in LTAC unit today.  He appears comfortable sitting up in the chair on 3 L of oxygen in no acute distress.  He is back to his baseline on the liters of oxygen he is feeling better no more epistaxis she is using nasal gel.  He feels more comfortable and working with the physical therapy.  Dr. Michele is following him for his lung cancer.  Meds:  acetaminophen, 1,000 mg, Oral, Nightly  aspirin, 81 mg, Oral, Daily  atorvastatin, 20 mg, Oral, Nightly  bisacodyl, 5 mg, Oral, Daily  budesonide, 0.5 mg, Nebulization, BID - RT  busPIRone, 5 mg, Oral, TID With Meals  cetirizine, 10 mg, Oral, Daily  empagliflozin, 10 mg, Oral, Daily  guaiFENesin, 600 mg, Oral, Q12H  HYDROcodone-acetaminophen, 1 tablet, Oral, TID  ipratropium-albuterol, 3 mL, Nebulization, 4x Daily - RT  isosorbide mononitrate, 30 mg, Oral, Daily Before Lunch  melatonin, 10 mg, Oral, Nightly  metoprolol succinate XL, 25 mg, Oral, Daily  pantoprazole, 40 mg, Oral, Daily  predniSONE, 10 mg, Oral, Daily  sodium bicarbonate, 650 mg, Oral, TID  sodium chloride, 2 spray, Each Nare, 5x Daily  tamsulosin, 0.4 mg, Oral, Nightly      sodium chloride, 2 spray      Physical Exam:  Vital signs reviewed all stable patient is on 3 L of oxygen and oxygen saturation 97% he needed 7 L oxygen on activity.  There were no vitals filed for this visit.  Body mass index is 28 kg/m².    No intake or output data in the 24 hours ending 10/21/24 1257  Physical Exam    Vitals and nursing note reviewed.   Constitutional:       General: He  is not in acute distress.     Appearance: He is ill-appearing. He is not toxic-appearing or diaphoretic.      Comments: Pleasant elderly  gentleman sitting up in the chair   HENT:      Head: Normocephalic and atraumatic.      Right Ear: Tympanic membrane and external ear normal. There is no impacted cerumen.      Left Ear: Tympanic membrane and external ear normal. There is no impacted cerumen.      Nose: Nose normal. Congestion present. No rhinorrhea.      Comments: Nosebleed noted mostly from the left nostril with clotted blood.     Mouth/Throat:      Mouth: Mucous membranes are moist.      Pharynx: Oropharynx is clear. No oropharyngeal exudate or posterior oropharyngeal erythema.   Eyes:      General: No scleral icterus.        Right eye: No discharge.         Left eye: No discharge.      Extraocular Movements: Extraocular movements intact.      Conjunctiva/sclera: Conjunctivae normal.      Pupils: Pupils are equal, round, and reactive to light.   Neck:      Vascular: No carotid bruit.   Cardiovascular:      Rate and Rhythm: Regular rhythm. Tachycardia present.      Pulses: Normal pulses.      Heart sounds: Normal heart sounds. No murmur heard.     No friction rub. No gallop.   Pulmonary:      Effort: Pulmonary effort is normal. No respiratory distress.      Breath sounds: No stridor. Wheezing present. No rhonchi or rales.      Comments: Decreased bilateral breath sounds  Chest:      Chest wall: No tenderness.   Abdominal:      General: Abdomen is flat. Bowel sounds are normal. There is no distension.      Palpations: Abdomen is soft. There is no mass.      Tenderness: There is no abdominal tenderness. There is no guarding or rebound.      Hernia: No hernia is present.   Genitourinary:     Comments: Not examined.  Musculoskeletal:         General: No swelling, tenderness, deformity or signs of injury. Normal range of motion.      Cervical back: Normal range of motion and neck supple. No rigidity or  tenderness.      Right lower leg: No edema.      Left lower leg: No edema.   Lymphadenopathy:      Cervical: No cervical adenopathy.   Skin:     General: Skin is warm.      Capillary Refill: Capillary refill takes less than 2 seconds.      Coloration: Skin is not jaundiced or pale.      Findings: No bruising, erythema, lesion or rash.   Neurological:      General: No focal deficit present.      Mental Status: He is alert and oriented to person, place, and time.      Cranial Nerves: No cranial nerve deficit.      Sensory: No sensory deficit.      Motor: Weakness present.      Deep Tendon Reflexes: Reflexes normal.      Comments: Hearing impairment noted   Psychiatric:         Mood and Affect: Mood normal.         Behavior: Behavior normal.         Thought Content: Thought content normal.         Judgment: Judgment normal.     Laboratory Data:  Results from last 7 days   Lab Units 10/21/24  0448 10/17/24  0511   WBC 10*3/mm3 9.35 9.05   HEMOGLOBIN g/dL 11.3* 11.9*   PLATELETS 10*3/mm3 118* 157     Results from last 7 days   Lab Units 10/21/24  0448 10/17/24  0641   SODIUM mmol/L 143 140   POTASSIUM mmol/L 5.1 4.7   BUN mg/dL 27* 35*   CREATININE mg/dL 1.79* 1.55*         Microbiology Results (last 10 days)       ** No results found for the last 240 hours. **          Recent films:  No radiology results for the last day  Personal review of imaging : CXR shows reviewed recent imaging studies chronic changes noted no acute changes noted.  Pulmonary Assessment:  Acute on chronic hypoxic respiratory failure  Dependence on supplemental oxygen  Bilateral pulm infiltrate/post-COVID syndrome  Secondary bacterial pneumonia  History of interstitial lung disease  Chronic congestive diastolic heart failure with preserved EF.  Chronic obstructive pulmonary disease.  No carcinoma the right upper lobe of the lung with metastasis  Status post chemotherapy and radiation treatment  Allergic rhinitis    Recommend:   Continue current care  plan.  Patient doing well and feeling better.  He is currently on 3 days of oxygen which is at his baseline.  He requires little more oxygen during physical therapy but overall feeling better  Continue physical activity incentive spirometry bronchial treatment and routine respiratory care  Oncology following for history of lung cancer.  DVT and stress ulcer prophylaxis and pain and anxiety control  Nutritional support.  Plan for outpatient follow-up in the pulmonary clinic with me in a month after discharge  CODE STATUS: Limited code.  Overall prognosis: Guarded.  We will follow.  Most likely be discharged next week    Electronically signed by     Tarik Crocker MD,  Pulmonologist/Intensivist   10/21/24, 12:57 CDT

## 2024-10-22 PROCEDURE — 97116 GAIT TRAINING THERAPY: CPT

## 2024-10-22 PROCEDURE — 97168 OT RE-EVAL EST PLAN CARE: CPT | Performed by: OCCUPATIONAL THERAPIST

## 2024-10-22 PROCEDURE — 99232 SBSQ HOSP IP/OBS MODERATE 35: CPT | Performed by: INTERNAL MEDICINE

## 2024-10-22 PROCEDURE — 97116 GAIT TRAINING THERAPY: CPT | Performed by: PHYSICAL THERAPIST

## 2024-10-22 PROCEDURE — 97164 PT RE-EVAL EST PLAN CARE: CPT | Performed by: PHYSICAL THERAPIST

## 2024-10-22 PROCEDURE — 63710000001 PREDNISONE PER 5 MG: Performed by: INTERNAL MEDICINE

## 2024-10-22 PROCEDURE — 97535 SELF CARE MNGMENT TRAINING: CPT | Performed by: OCCUPATIONAL THERAPIST

## 2024-10-22 NOTE — PROGRESS NOTES
[facility] [place], [place] PULMONARY AND CRITICAL CARE PROGRESS NOTE - [facility]    Patient: Karoline Prater  1940   MR# [id number]   Acct# [id number]  10/22/24   12:58 CDT  Referring Provider: Edmond Izquierdo MD    Chief Complaint: Shortness of breath, acute on chronic respiratory failure, COPD, oxygen dependent, history of COVID infection the past, need for rehab  Interval history: Patient was seen in the follow-up visit in pulmonary rounds in LTAC unit today.  He is stable and remains on 3 L of oxygen at rest but needs 6 to 7 L on activity.  He is working with the physical therapy and had no other new complaints except nasal dryness for which he is requesting the saline gel.  He is hoping to go home next week.    Meds:    Scheduled IV Meds with Route   acetaminophen, 1,000 mg, Oral, Nightly  aspirin, 81 mg, Oral, Daily  atorvastatin, 20 mg, Oral, Nightly  bisacodyl, 5 mg, Oral, Daily  budesonide, 0.5 mg, Nebulization, BID - RT  busPIRone, 5 mg, Oral, TID With Meals  cetirizine, 10 mg, Oral, Daily  empagliflozin, 10 mg, Oral, Daily  guaiFENesin, 600 mg, Oral, Q12H  HYDROcodone-acetaminophen, 1 tablet, Oral, TID  ipratropium-albuterol, 3 mL, Nebulization, 4x Daily - RT  isosorbide mononitrate, 30 mg, Oral, Daily Before Lunch  melatonin, 10 mg, Oral, Nightly  metoprolol succinate XL, 25 mg, Oral, Daily  pantoprazole, 40 mg, Oral, Daily  predniSONE, 10 mg, Oral, Daily  sodium bicarbonate, 650 mg, Oral, TID  sodium chloride, 2 spray, Each Nare, 5x Daily  tamsulosin, 0.4 mg, Oral, Nightly           Infusion Medications       acetaminophen, 1,000 mg, Oral, Nightly  aspirin, 81 mg, Oral, Daily  atorvastatin, 20 mg, Oral, Nightly  bisacodyl, 5 mg, Oral, Daily  budesonide, 0.5 mg, Nebulization, BID - RT  busPIRone, 5 mg, Oral, TID With Meals  cetirizine, 10 mg, Oral, Daily  empagliflozin, 10 mg, Oral, Daily  guaiFENesin, 600 mg, Oral, Q12H  HYDROcodone-acetaminophen, 1 tablet, Oral, TID  ipratropium-albuterol,  3 mL, Nebulization, 4x Daily - RT  isosorbide mononitrate, 30 mg, Oral, Daily Before Lunch  melatonin, 10 mg, Oral, Nightly  metoprolol succinate XL, 25 mg, Oral, Daily  pantoprazole, 40 mg, Oral, Daily  predniSONE, 10 mg, Oral, Daily  sodium bicarbonate, 650 mg, Oral, TID  sodium chloride, 2 spray, Each Nare, 5x Daily  tamsulosin, 0.4 mg, Oral, Nightly      sodium chloride, 2 spray      Physical Exam:  Vital signs reviewed all stable patient is on 3 L of oxygen and oxygen saturation 97% he needed 7 L oxygen on activity.  There were no vitals filed for this visit.  Body mass index is 28 kg/m².    No intake or output data in the 24 hours ending 10/22/24 1258  Physical Exam    Vitals and nursing note reviewed.   Constitutional:       General: He is not in acute distress.     Appearance: He is ill-appearing. He is not toxic-appearing or diaphoretic.      Comments: Pleasant elderly  gentleman sitting up in the chair   HENT:      Head: Normocephalic and atraumatic.      Right Ear: Tympanic membrane and external ear normal. There is no impacted cerumen.      Left Ear: Tympanic membrane and external ear normal. There is no impacted cerumen.      Nose: Nose normal. Congestion present. No rhinorrhea.      Comments: Nosebleed noted mostly from the left nostril with clotted blood.     Mouth/Throat:      Mouth: Mucous membranes are moist.      Pharynx: Oropharynx is clear. No oropharyngeal exudate or posterior oropharyngeal erythema.   Eyes:      General: No scleral icterus.        Right eye: No discharge.         Left eye: No discharge.      Extraocular Movements: Extraocular movements intact.      Conjunctiva/sclera: Conjunctivae normal.      Pupils: Pupils are equal, round, and reactive to light.   Neck:      Vascular: No carotid bruit.   Cardiovascular:      Rate and Rhythm: Regular rhythm. Tachycardia present.      Pulses: Normal pulses.      Heart sounds: Normal heart sounds. No murmur heard.     No friction rub.  No gallop.   Pulmonary:      Effort: Pulmonary effort is normal. No respiratory distress.      Breath sounds: No stridor. Wheezing present. No rhonchi or rales.      Comments: Decreased bilateral breath sounds  Chest:      Chest wall: No tenderness.   Abdominal:      General: Abdomen is flat. Bowel sounds are normal. There is no distension.      Palpations: Abdomen is soft. There is no mass.      Tenderness: There is no abdominal tenderness. There is no guarding or rebound.      Hernia: No hernia is present.   Genitourinary:     Comments: Not examined.  Musculoskeletal:         General: No swelling, tenderness, deformity or signs of injury. Normal range of motion.      Cervical back: Normal range of motion and neck supple. No rigidity or tenderness.      Right lower leg: No edema.      Left lower leg: No edema.   Lymphadenopathy:      Cervical: No cervical adenopathy.   Skin:     General: Skin is warm.      Capillary Refill: Capillary refill takes less than 2 seconds.      Coloration: Skin is not jaundiced or pale.      Findings: No bruising, erythema, lesion or rash.   Neurological:      General: No focal deficit present.      Mental Status: He is alert and oriented to person, place, and time.      Cranial Nerves: No cranial nerve deficit.      Sensory: No sensory deficit.      Motor: Weakness present.      Deep Tendon Reflexes: Reflexes normal.      Comments: Hearing impairment noted   Psychiatric:         Mood and Affect: Mood normal.         Behavior: Behavior normal.         Thought Content: Thought content normal.         Judgment: Judgment normal.     Laboratory Data:  Results from last 7 days   Lab Units 10/21/24  0448 10/17/24  0511   WBC 10*3/mm3 9.35 9.05   HEMOGLOBIN g/dL 11.3* 11.9*   PLATELETS 10*3/mm3 118* 157     Results from last 7 days   Lab Units 10/21/24  0448 10/17/24  0641   SODIUM mmol/L 143 140   POTASSIUM mmol/L 5.1 4.7   BUN mg/dL 27* 35*   CREATININE mg/dL 1.79* 1.55*         Microbiology  Results (last 10 days)       ** No results found for the last 240 hours. **          Recent films:  No radiology results for the last day  Personal review of imaging : CXR shows reviewed recent imaging studies chronic changes noted no acute changes noted.  Pulmonary Assessment:  Acute on chronic hypoxic respiratory failure  Dependence on supplemental oxygen  Bilateral pulm infiltrate/post-COVID syndrome  Secondary bacterial pneumonia  History of interstitial lung disease  Chronic congestive diastolic heart failure with preserved EF.  Chronic obstructive pulmonary disease.  No carcinoma the right upper lobe of the lung with metastasis  Status post chemotherapy and radiation treatment  Allergic rhinitis    Recommend:   Continue current care plan.  Patient doing well and feeling better.  Continue supplemental oxygen 3 L at rest and 5 to 7 L on activity.  He is back to his baseline oxygen requirement.    He has chronic lung disease with fibrosis and COPD and also had COVID in the past.  He requires little more oxygen during physical therapy but overall feeling better  Continue physical activity incentive spirometry bronchial treatment and routine respiratory care  Oncology following for history of lung cancer.  DVT and stress ulcer prophylaxis and pain and anxiety control  Nutritional support.  Plan for outpatient follow-up in the pulmonary clinic with me in a month after discharge  CODE STATUS: Limited code.  Overall prognosis: Guarded.  We will follow.  Most likely be discharged next week    Electronically signed by     Tarik Crocker MD,  Pulmonologist/Intensivist   10/22/24, 12:58 CDT

## 2024-10-22 NOTE — PROGRESS NOTES
ROBSON Borjas APRN        Internal Medicine Progress Note    10/22/2024   12:46 CDT    Name:  Karoline Prater  MRN:    [id number]     Acct:     [id number]   Room:  [address] [place] [address] Day: 0     Admit Date: 10/7/2024  4:53 PM  PCP: Irving Alexis MD    Subjective:     C/C: weakness, shortness of breath    Interval History: Status:  improved. Up to chair. No family at bedside. Afebrile. Maintaining adequate 02 sats with 02 at 4 lpm at rest. Requiring up to 6-7 lpm with activity. Reporting decreased recovery time. Denies pain. Progressing with therapies. Tolerating treatment thus far.    Review of Systems   Constitutional: Positive for malaise/fatigue. Negative for chills, decreased appetite, weight gain and weight loss.   HENT:  Negative for congestion, ear discharge, hoarse voice and tinnitus.    Eyes:  Negative for blurred vision, discharge, visual disturbance and visual halos.   Cardiovascular:  Positive for dyspnea on exertion. Negative for chest pain, claudication, irregular heartbeat, leg swelling, orthopnea and paroxysmal nocturnal dyspnea.   Respiratory:  Positive for shortness of breath. Negative for cough, sputum production and wheezing.    Endocrine: Negative for cold intolerance, heat intolerance and polyuria.   Hematologic/Lymphatic: Negative for adenopathy. Does not bruise/bleed easily.   Skin:  Negative for dry skin, itching and suspicious lesions.   Musculoskeletal:  Negative for arthritis, back pain, falls, joint pain, muscle weakness and myalgias.   Gastrointestinal:  Negative for abdominal pain, constipation, diarrhea, dysphagia and hematemesis.   Genitourinary:  Negative for bladder incontinence, dysuria and frequency.   Neurological:  Positive for weakness. Negative for aphonia, disturbances in coordination and dizziness.   Psychiatric/Behavioral:  Negative for altered mental status, depression, memory loss and substance abuse. The patient does not  have insomnia and is not nervous/anxious.          Medications:     Allergies:   Allergies   Allergen Reactions    Penicillins Hives       Current Meds:   Current Facility-Administered Medications:     acetaminophen (TYLENOL) tablet 1,000 mg, 1,000 mg, Oral, Nightly, Edmond Izquierdo MD    aspirin EC tablet 81 mg, 81 mg, Oral, Daily, Edmond Izquierdo MD    atorvastatin (LIPITOR) tablet 20 mg, 20 mg, Oral, Nightly, Edmond Izquierdo MD    bisacodyl (DULCOLAX) EC tablet 5 mg, 5 mg, Oral, Daily, Sil Jensen APRN    bisacodyl (DULCOLAX) suppository 10 mg, 10 mg, Rectal, Daily PRN, Edmond Izquierdo MD    budesonide (PULMICORT) nebulizer solution 0.5 mg, 0.5 mg, Nebulization, BID - RT, Tarik Crocker MD    busPIRone (BUSPAR) tablet 5 mg, 5 mg, Oral, TID With Meals, Edmond Izquierdo MD    cetirizine (zyrTEC) tablet 10 mg, 10 mg, Oral, Daily, Edmond Izquierdo MD    diphenhydrAMINE (BENADRYL) capsule 25 mg, 25 mg, Oral, Nightly PRN, Edmond Izquierdo MD    empagliflozin (JARDIANCE) tablet 10 mg, 10 mg, Oral, Daily, Edmond Izquierdo MD    guaiFENesin (MUCINEX) 12 hr tablet 600 mg, 600 mg, Oral, Q12H, Edmond Izquierdo MD    HYDROcodone-acetaminophen (NORCO) 5-325 MG per tablet 1 tablet, 1 tablet, Oral, TID, Edmond Izquierdo MD    hydrocortisone (ANUSOL-HC) suppository 25 mg, 25 mg, Rectal, Q12H PRN, Edmond Izquierdo MD    ipratropium-albuterol (DUO-NEB) nebulizer solution 3 mL, 3 mL, Nebulization, 4x Daily - RT, Edmond Izquierdo MD    isosorbide mononitrate (IMDUR) 24 hr tablet 30 mg, 30 mg, Oral, Daily Before Lunch, Edmond Izquierdo MD    melatonin tablet 10 mg, 10 mg, Oral, Nightly, Edmond Izquierdo MD    metoprolol succinate XL (TOPROL-XL) 24 hr tablet 25 mg, 25 mg, Oral, Daily, Edmond Izquierdo MD    nitroglycerin (NITROSTAT) SL tablet 0.4 mg, 0.4 mg, Sublingual, Q5 Min PRN, Edmond Izquierdo MD    ondansetron ODT  "(ZOFRAN-ODT) disintegrating tablet 4 mg, 4 mg, Oral, Q6H PRN **OR** ondansetron (ZOFRAN) injection 4 mg, 4 mg, Intravenous, Q6H PRN, Edmond Izquierdo MD    pantoprazole (PROTONIX) EC tablet 40 mg, 40 mg, Oral, Daily, Edmond Izquierdo MD    polyethylene glycol (MIRALAX) packet 17 g, 17 g, Oral, Daily PRN, Edmond Izquierdo MD    predniSONE (DELTASONE) tablet 10 mg, 10 mg, Oral, Daily, Gareth Becerra MD    sodium bicarbonate tablet 650 mg, 650 mg, Oral, TID, Edmond Izquierdo MD    sodium chloride 0.9 % bolus 250 mL, 250 mL, Intravenous, PRN, Edmond Izquierdo MD    sodium chloride nasal spray 2 spray, 2 spray, Each Nare, 5x Daily, Edmond Izquierdo MD    sodium chloride nasal spray 2 spray, 2 spray, Each Nare, Continuous PRN, Edmond Izquierdo MD    tamsulosin (FLOMAX) 24 hr capsule 0.4 mg, 0.4 mg, Oral, Nightly, Edmond Izquierdo MD    traZODone (DESYREL) tablet 25 mg, 25 mg, Oral, Nightly PRN, Edmond Izquierdo MD    Data:     Code Status:    There are no questions and answers to display.       Family History   Problem Relation Age of Onset    Hypertension Mother     Leukemia Father        Social History     Socioeconomic History    Marital status: Single   Tobacco Use    Smoking status: Former     Current packs/day: 0.00     Types: Cigarettes     Start date: 10/29/1954     Quit date: 10/29/2003     Years since quittin.9    Smokeless tobacco: Former     Types: Chew     Quit date:    Vaping Use    Vaping status: Never Used   Substance and Sexual Activity    Alcohol use: No    Drug use: No    Sexual activity: Defer       Vitals:  Ht 162.6 cm (64\")   Wt 74 kg (163 lb 1.6 oz)   BMI 28.00 kg/m²   T 97.6 P 77 R 30  /72 Sp02 97% (4 lpm)          I/O (24Hr):  No intake or output data in the 24 hours ending 10/22/24 1246    Labs and imaging:      No results found for this or any previous visit (from the past 12 " hours).                                    Physical Examination:        Physical Exam  Vitals and nursing note reviewed.   Constitutional:       Appearance: He is well-developed.   HENT:      Head: Normocephalic and atraumatic.      Nose: Nose normal.      Mouth/Throat:      Mouth: Mucous membranes are moist.      Pharynx: Oropharynx is clear.   Eyes:      Pupils: Pupils are equal, round, and reactive to light.      Comments: Left eye enucleated   Cardiovascular:      Rate and Rhythm: Normal rate and regular rhythm.      Heart sounds: Normal heart sounds.   Pulmonary:      Effort: Pulmonary effort is normal.      Breath sounds: Normal breath sounds.   Abdominal:      General: Bowel sounds are normal.      Palpations: Abdomen is soft.   Musculoskeletal:         General: Normal range of motion.      Cervical back: Normal range of motion and neck supple.      Comments: Generalized weakness   Skin:     General: Skin is warm and dry.   Neurological:      Mental Status: He is alert and oriented to person, place, and time.      Deep Tendon Reflexes: Reflexes are normal and symmetric.   Psychiatric:         Behavior: Behavior normal.           Assessment:             Stage 3b chronic kidney disease    A-fib    Former smoker    COPD (chronic obstructive pulmonary disease)    History of radiation therapy    Chronic heart failure with preserved ejection fraction (HFpEF)    Atrial flutter    COVID-19 virus infection    Acute-on-chronic respiratory failure    Epistaxis    Chronic rhinitis    History of pneumonia    Supplemental oxygen dependent    Past Medical History:   Diagnosis Date    Arthritis     Atrial fibrillation with rapid ventricular response 05/31/2019    Blindness of left eye     BPH with obstruction/lower urinary tract symptoms     Cancer     stomach & lung    CHF (congestive heart failure)     CKD (chronic kidney disease) stage 3, GFR 30-59 ml/min     COPD with acute exacerbation 08/03/2024    Coronary artery  disease     Elevated cholesterol     Essential hypertension 10/02/2017    Fibrotic lung diseases     GERD (gastroesophageal reflux disease)     Hearing loss     History of transfusion     Hydronephrosis of left kidney     Hyperlipidemia     Hypertension     pt was taken off of all of his medications for BP (atenolol, lisinopril, lasix) because his BP kept bottoming out so his primary dr told him to discontinue them 1-2 months ago (Jan/Feb 2019). pt states he takes no medications currently.    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 Patel is just going to keep an eye on it and not do surgery right now.    Mediastinal adenopathy 10/24/2018    Station [facility]    Monoclonal gammopathy of unknown significance (MGUS) 09/11/2018    Pancreatic mass     pt states he had this in 2013 but it went away on its own. Now recent CT shows it has come back so he is going to have an ultrasound on 3/13/19.    Shortness of breath         Plan:        Acute on chronic hypoxic respiratory failure  Stage 4 metastatic non small cell lung cancer  COPD  Interstitial lung disease  Chronic diastolic CHF  CKD3  Hyperkalemia  Multifactorial anemia  Anxiety  Hemorrhoids  Constipation    Continue current treatment. Monitor counts. Increase activity. Labs Thursday. Continue oxygen weaning as tolerated under direction of pulmonology. Aggressive therapies as tolerated. Maintain patient safety. Adjust bowel regimen as needed.       Electronically signed by MARITZA Greenwood on 10/22/2024 at 12:46 CDT     I have discussed the care of Karoline Prater, including pertinent history and exam findings, with the nurse practitioner.    I have seen and examined the patient and the key elements of all parts of the encounter have been performed by me.  I agree with the assessment, plan and orders as  documented by MARITZA Frias, after I modified the exam findings and the plan of treatments and the final version is my approved version of the assessment.        Electronically signed by Edmond Izquierdo MD on 10/22/2024 at 20:49 CDT

## 2024-10-23 PROCEDURE — 97535 SELF CARE MNGMENT TRAINING: CPT

## 2024-10-23 PROCEDURE — 63710000001 PREDNISONE PER 5 MG: Performed by: INTERNAL MEDICINE

## 2024-10-23 PROCEDURE — 97530 THERAPEUTIC ACTIVITIES: CPT

## 2024-10-23 PROCEDURE — 97116 GAIT TRAINING THERAPY: CPT

## 2024-10-23 PROCEDURE — 97110 THERAPEUTIC EXERCISES: CPT

## 2024-10-23 PROCEDURE — 99233 SBSQ HOSP IP/OBS HIGH 50: CPT | Performed by: INTERNAL MEDICINE

## 2024-10-23 RX ORDER — HYDROCODONE BITARTRATE AND ACETAMINOPHEN 5; 325 MG/1; MG/1
1 TABLET ORAL 3 TIMES DAILY
Status: DISPENSED | OUTPATIENT
Start: 2024-10-23 | End: 2024-10-28

## 2024-10-23 NOTE — PROGRESS NOTES
[facility] Fall Risk Assessment Note    Name: Karoline Prater  MRN: [id number]  CSN: [id number]  Admit Date/Time: 10/7/2024  4:53 PM.    Currently ordered medications associated with an increased risk for fall include:    Scheduled Meds:  bisacodyl, 5 mg, Oral, Daily  busPIRone, 5 mg, Oral, TID With Meals  cetirizine, 10 mg, Oral, Daily  empagliflozin, 10 mg, Oral, Daily  guaiFENesin, 600 mg, Oral, Q12H  HYDROcodone-acetaminophen, 1 tablet, Oral, TID  isosorbide mononitrate, 30 mg, Oral, Daily Before Lunch  melatonin, 10 mg, Oral, Nightly  metoprolol succinate XL, 25 mg, Oral, Daily  tamsulosin, 0.4 mg, Oral, Nightly      PRN Meds:    bisacodyl    diphenhydrAMINE    nitroglycerin    ondansetron ODT **OR** ondansetron    polyethylene glycol    traZODone    Comments/Recommendations:  De-escalate Norco as appropriate.  Discontinue cetirizine. Anticholinergic effects increase fall risk and could also be contributing to dry nose patient has been complaining of.    Norris Helm, Pharmacy Intern  10/23/24 10:12 CDT

## 2024-10-23 NOTE — PROGRESS NOTES
ROBSON Borjas APRN        Internal Medicine Progress Note    10/23/2024   10:01 CDT    Name:  Karoline Prater  MRN:    [id number]     Acct:     [id number]   Room:  [address] [place] [address] Day: 0     Admit Date: 10/7/2024  4:53 PM  PCP: Irving Alexis MD    Subjective:     C/C: weakness, shortness of breath    Interval History: Status:  improved. Up to chair. No family at bedside. Afebrile. Maintaining adequate 02 sats with 02 at 4 lpm at rest. Requiring up to 6-7 lpm with activity. Reporting decreased recovery time. Denies pain. Progressing with therapies. Tolerating treatment thus far.    Review of Systems   Constitutional: Positive for malaise/fatigue. Negative for chills, decreased appetite, weight gain and weight loss.   HENT:  Negative for congestion, ear discharge, hoarse voice and tinnitus.    Eyes:  Negative for blurred vision, discharge, visual disturbance and visual halos.   Cardiovascular:  Positive for dyspnea on exertion. Negative for chest pain, claudication, irregular heartbeat, leg swelling, orthopnea and paroxysmal nocturnal dyspnea.   Respiratory:  Positive for shortness of breath. Negative for cough, sputum production and wheezing.    Endocrine: Negative for cold intolerance, heat intolerance and polyuria.   Hematologic/Lymphatic: Negative for adenopathy. Does not bruise/bleed easily.   Skin:  Negative for dry skin, itching and suspicious lesions.   Musculoskeletal:  Negative for arthritis, back pain, falls, joint pain, muscle weakness and myalgias.   Gastrointestinal:  Negative for abdominal pain, constipation, diarrhea, dysphagia and hematemesis.   Genitourinary:  Negative for bladder incontinence, dysuria and frequency.   Neurological:  Positive for weakness. Negative for aphonia, disturbances in coordination and dizziness.   Psychiatric/Behavioral:  Negative for altered mental status, depression, memory loss and substance abuse. The patient does not  have insomnia and is not nervous/anxious.          Medications:     Allergies:   Allergies   Allergen Reactions    Penicillins Hives       Current Meds:   Current Facility-Administered Medications:     acetaminophen (TYLENOL) tablet 1,000 mg, 1,000 mg, Oral, Nightly, Edmond Izquierdo MD    aspirin EC tablet 81 mg, 81 mg, Oral, Daily, Edmond Izquierdo MD    atorvastatin (LIPITOR) tablet 20 mg, 20 mg, Oral, Nightly, Edmond Izquierdo MD    bisacodyl (DULCOLAX) EC tablet 5 mg, 5 mg, Oral, Daily, Sil Jensen APRN    bisacodyl (DULCOLAX) suppository 10 mg, 10 mg, Rectal, Daily PRN, Edmond Izquierdo MD    budesonide (PULMICORT) nebulizer solution 0.5 mg, 0.5 mg, Nebulization, BID - RT, Tarik Crocker MD    busPIRone (BUSPAR) tablet 5 mg, 5 mg, Oral, TID With Meals, Edmond Izquierdo MD    cetirizine (zyrTEC) tablet 10 mg, 10 mg, Oral, Daily, Edmond Izquierdo MD    diphenhydrAMINE (BENADRYL) capsule 25 mg, 25 mg, Oral, Nightly PRN, Edmond Izquierdo MD    empagliflozin (JARDIANCE) tablet 10 mg, 10 mg, Oral, Daily, Edmond Izquierdo MD    guaiFENesin (MUCINEX) 12 hr tablet 600 mg, 600 mg, Oral, Q12H, Edmond Izquierdo MD    HYDROcodone-acetaminophen (NORCO) 5-325 MG per tablet 1 tablet, 1 tablet, Oral, TID, Edmond Izquierdo MD    hydrocortisone (ANUSOL-HC) suppository 25 mg, 25 mg, Rectal, Q12H PRN, Edmond Izquierdo MD    ipratropium-albuterol (DUO-NEB) nebulizer solution 3 mL, 3 mL, Nebulization, 4x Daily - RT, Edmond Izquierdo MD    isosorbide mononitrate (IMDUR) 24 hr tablet 30 mg, 30 mg, Oral, Daily Before Lunch, Edmond Izquierdo MD    melatonin tablet 10 mg, 10 mg, Oral, Nightly, Edmond Izquierdo MD    metoprolol succinate XL (TOPROL-XL) 24 hr tablet 25 mg, 25 mg, Oral, Daily, Edmond Izquierdo MD    nitroglycerin (NITROSTAT) SL tablet 0.4 mg, 0.4 mg, Sublingual, Q5 Min PRN, Edmond Izquierdo MD    ondansetron ODT  "(ZOFRAN-ODT) disintegrating tablet 4 mg, 4 mg, Oral, Q6H PRN **OR** ondansetron (ZOFRAN) injection 4 mg, 4 mg, Intravenous, Q6H PRN, Edmond Izquierdo MD    pantoprazole (PROTONIX) EC tablet 40 mg, 40 mg, Oral, Daily, Emdond Izquierdo MD    polyethylene glycol (MIRALAX) packet 17 g, 17 g, Oral, Daily PRN, Edmond Izquierdo MD    predniSONE (DELTASONE) tablet 10 mg, 10 mg, Oral, Daily, Gareth Becerra MD    sodium bicarbonate tablet 650 mg, 650 mg, Oral, TID, Edmond Izquierdo MD    sodium chloride 0.9 % bolus 250 mL, 250 mL, Intravenous, PRN, Edmond Izquierdo MD    sodium chloride nasal spray 2 spray, 2 spray, Each Nare, 5x Daily, Edmond Izquierdo MD    sodium chloride nasal spray 2 spray, 2 spray, Each Nare, Continuous PRN, Edmond Izquierdo MD    tamsulosin (FLOMAX) 24 hr capsule 0.4 mg, 0.4 mg, Oral, Nightly, Edmond Izquierdo MD    traZODone (DESYREL) tablet 25 mg, 25 mg, Oral, Nightly PRN, Edmond Izquierdo MD    Data:     Code Status:    There are no questions and answers to display.       Family History   Problem Relation Age of Onset    Hypertension Mother     Leukemia Father        Social History     Socioeconomic History    Marital status: Single   Tobacco Use    Smoking status: Former     Current packs/day: 0.00     Types: Cigarettes     Start date: 10/29/1954     Quit date: 10/29/2003     Years since quittin.0    Smokeless tobacco: Former     Types: Chew     Quit date:    Vaping Use    Vaping status: Never Used   Substance and Sexual Activity    Alcohol use: No    Drug use: No    Sexual activity: Defer       Vitals:  Ht 162.6 cm (64\")   Wt 74 kg (163 lb 1.6 oz)   BMI 28.00 kg/m²   T 98.1 P 71 R 18  /76 Sp02 99% (4 lpm)          I/O (24Hr):  No intake or output data in the 24 hours ending 10/23/24 1001    Labs and imaging:      No results found for this or any previous visit (from the past 12 " hours).                                    Physical Examination:        Physical Exam  Vitals and nursing note reviewed.   Constitutional:       Appearance: He is well-developed.   HENT:      Head: Normocephalic and atraumatic.      Nose: Nose normal.      Mouth/Throat:      Mouth: Mucous membranes are moist.      Pharynx: Oropharynx is clear.   Eyes:      Pupils: Pupils are equal, round, and reactive to light.      Comments: Left eye enucleated   Cardiovascular:      Rate and Rhythm: Normal rate and regular rhythm.      Heart sounds: Normal heart sounds.   Pulmonary:      Effort: Pulmonary effort is normal.      Breath sounds: Normal breath sounds.   Abdominal:      General: Bowel sounds are normal.      Palpations: Abdomen is soft.   Musculoskeletal:         General: Normal range of motion.      Cervical back: Normal range of motion and neck supple.      Comments: Generalized weakness   Skin:     General: Skin is warm and dry.   Neurological:      Mental Status: He is alert and oriented to person, place, and time.      Deep Tendon Reflexes: Reflexes are normal and symmetric.   Psychiatric:         Behavior: Behavior normal.           Assessment:             Stage 3b chronic kidney disease    A-fib    Former smoker    COPD (chronic obstructive pulmonary disease)    History of radiation therapy    Chronic heart failure with preserved ejection fraction (HFpEF)    Atrial flutter    COVID-19 virus infection    Acute-on-chronic respiratory failure    Epistaxis    Chronic rhinitis    History of pneumonia    Supplemental oxygen dependent    Past Medical History:   Diagnosis Date    Arthritis     Atrial fibrillation with rapid ventricular response 05/31/2019    Blindness of left eye     BPH with obstruction/lower urinary tract symptoms     Cancer     stomach & lung    CHF (congestive heart failure)     CKD (chronic kidney disease) stage 3, GFR 30-59 ml/min     COPD with acute exacerbation 08/03/2024    Coronary artery  disease     Elevated cholesterol     Essential hypertension 10/02/2017    Fibrotic lung diseases     GERD (gastroesophageal reflux disease)     Hearing loss     History of transfusion     Hydronephrosis of left kidney     Hyperlipidemia     Hypertension     pt was taken off of all of his medications for BP (atenolol, lisinopril, lasix) because his BP kept bottoming out so his primary dr told him to discontinue them 1-2 months ago (Jan/Feb 2019). pt states he takes no medications currently.    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 Patel is just going to keep an eye on it and not do surgery right now.    Mediastinal adenopathy 10/24/2018    Station [facility]    Monoclonal gammopathy of unknown significance (MGUS) 09/11/2018    Pancreatic mass     pt states he had this in 2013 but it went away on its own. Now recent CT shows it has come back so he is going to have an ultrasound on 3/13/19.    Shortness of breath         Plan:        Acute on chronic hypoxic respiratory failure  Stage 4 metastatic non small cell lung cancer  COPD  Interstitial lung disease  Chronic diastolic CHF  CKD3  Hyperkalemia  Multifactorial anemia  Anxiety  Hemorrhoids  Constipation    Continue current treatment. Monitor counts. Increase activity. Labs in am. Continue oxygen weaning as tolerated under direction of pulmonology. Aggressive therapies as tolerated. Maintain patient safety. Adjust bowel regimen as needed.       Electronically signed by MARITZA Greenwood on 10/23/2024 at 10:01 CDT     I have discussed the care of Karoline Prater, including pertinent history and exam findings, with the nurse practitioner.    I have seen and examined the patient and the key elements of all parts of the encounter have been performed by me.  I agree with the assessment, plan and orders as  documented by MARITZA Frias, after I modified the exam findings and the plan of treatments and the final version is my approved version of the assessment.        Electronically signed by Edmond Izquierdo MD on 10/23/2024 at 11:05 CDT

## 2024-10-23 NOTE — PROGRESS NOTES
[facility] PULMONARY AND CRITICAL CARE PROGRESS NOTE - [facility]    Patient: Karoline Prater  1940   MR# [id number]   Acct# [id number]  10/23/24   13:22 CDT  Referring Provider: Edmond Izquierdo MD    Chief Complaint: Shortness of breath, acute on chronic respiratory failure, COPD, oxygen dependent, history of COVID infection the past, need for rehab  Interval history: Patient was seen in the follow-up visit in pulmonary rounds in LTAC unit today.  He is overall feeling better and is stable sitting up in the chair on 3 L of oxygen.  Wearing 5 to 6 L on activity.  He still reported having some nasal dryness and epistaxis but using saline nasal spray and she will frequently.  He is not on any steroid nasal spray.  He is working with the physical therapy and had no other new complaints except nasal dryness for which he is requesting the saline gel.  He is hoping to go home next week.    Meds:    Scheduled IV Meds with Route   acetaminophen, 1,000 mg, Oral, Nightly  aspirin, 81 mg, Oral, Daily  atorvastatin, 20 mg, Oral, Nightly  bisacodyl, 5 mg, Oral, Daily  budesonide, 0.5 mg, Nebulization, BID - RT  busPIRone, 5 mg, Oral, TID With Meals  cetirizine, 10 mg, Oral, Daily  empagliflozin, 10 mg, Oral, Daily  guaiFENesin, 600 mg, Oral, Q12H  HYDROcodone-acetaminophen, 1 tablet, Oral, TID  ipratropium-albuterol, 3 mL, Nebulization, 4x Daily - RT  isosorbide mononitrate, 30 mg, Oral, Daily Before Lunch  melatonin, 10 mg, Oral, Nightly  metoprolol succinate XL, 25 mg, Oral, Daily  pantoprazole, 40 mg, Oral, Daily  predniSONE, 10 mg, Oral, Daily  sodium bicarbonate, 650 mg, Oral, TID  sodium chloride, 2 spray, Each Nare, 5x Daily  tamsulosin, 0.4 mg, Oral, Nightly           Infusion Medications       acetaminophen, 1,000 mg, Oral, Nightly  aspirin, 81 mg, Oral, Daily  atorvastatin, 20 mg, Oral, Nightly  bisacodyl, 5 mg, Oral, Daily  budesonide, 0.5 mg, Nebulization, BID - RT  busPIRone, 5 mg, Oral,  TID With Meals  cetirizine, 10 mg, Oral, Daily  empagliflozin, 10 mg, Oral, Daily  guaiFENesin, 600 mg, Oral, Q12H  HYDROcodone-acetaminophen, 1 tablet, Oral, TID  ipratropium-albuterol, 3 mL, Nebulization, 4x Daily - RT  isosorbide mononitrate, 30 mg, Oral, Daily Before Lunch  melatonin, 10 mg, Oral, Nightly  metoprolol succinate XL, 25 mg, Oral, Daily  pantoprazole, 40 mg, Oral, Daily  predniSONE, 10 mg, Oral, Daily  sodium bicarbonate, 650 mg, Oral, TID  sodium chloride, 2 spray, Each Nare, 5x Daily  tamsulosin, 0.4 mg, Oral, Nightly      sodium chloride, 2 spray      Physical Exam:  Vital signs reviewed all stable patient is on 3 L of oxygen and oxygen saturation 97% he needed 7 L oxygen on activity.  There were no vitals filed for this visit.  Body mass index is 28 kg/m².    No intake or output data in the 24 hours ending 10/23/24 1322  Physical Exam    Vitals and nursing note reviewed.   Constitutional:       General: He is not in acute distress.     Appearance: He is ill-appearing. He is not toxic-appearing or diaphoretic.      Comments: Pleasant elderly  gentleman sitting up in the chair   HENT:      Head: Normocephalic and atraumatic.      Right Ear: Tympanic membrane and external ear normal. There is no impacted cerumen.      Left Ear: Tympanic membrane and external ear normal. There is no impacted cerumen.      Nose: Nose normal. Congestion present. No rhinorrhea.      Comments: Nosebleed noted mostly from the left nostril with clotted blood.     Mouth/Throat:      Mouth: Mucous membranes are moist.      Pharynx: Oropharynx is clear. No oropharyngeal exudate or posterior oropharyngeal erythema.   Eyes:      General: No scleral icterus.        Right eye: No discharge.         Left eye: No discharge.      Extraocular Movements: Extraocular movements intact.      Conjunctiva/sclera: Conjunctivae normal.      Pupils: Pupils are equal, round, and reactive to light.   Neck:      Vascular: No carotid  bruit.   Cardiovascular:      Rate and Rhythm: Regular rhythm. Tachycardia present.      Pulses: Normal pulses.      Heart sounds: Normal heart sounds. No murmur heard.     No friction rub. No gallop.   Pulmonary:      Effort: Pulmonary effort is normal. No respiratory distress.      Breath sounds: No stridor. Wheezing present. No rhonchi or rales.      Comments: Decreased bilateral breath sounds  Chest:      Chest wall: No tenderness.   Abdominal:      General: Abdomen is flat. Bowel sounds are normal. There is no distension.      Palpations: Abdomen is soft. There is no mass.      Tenderness: There is no abdominal tenderness. There is no guarding or rebound.      Hernia: No hernia is present.   Genitourinary:     Comments: Not examined.  Musculoskeletal:         General: No swelling, tenderness, deformity or signs of injury. Normal range of motion.      Cervical back: Normal range of motion and neck supple. No rigidity or tenderness.      Right lower leg: No edema.      Left lower leg: No edema.   Lymphadenopathy:      Cervical: No cervical adenopathy.   Skin:     General: Skin is warm.      Capillary Refill: Capillary refill takes less than 2 seconds.      Coloration: Skin is not jaundiced or pale.      Findings: No bruising, erythema, lesion or rash.   Neurological:      General: No focal deficit present.      Mental Status: He is alert and oriented to person, place, and time.      Cranial Nerves: No cranial nerve deficit.      Sensory: No sensory deficit.      Motor: Weakness present.      Deep Tendon Reflexes: Reflexes normal.      Comments: Hearing impairment noted   Psychiatric:         Mood and Affect: Mood normal.         Behavior: Behavior normal.         Thought Content: Thought content normal.         Judgment: Judgment normal.     Laboratory Data:  Results from last 7 days   Lab Units 10/21/24  0448 10/17/24  0511   WBC 10*3/mm3 9.35 9.05   HEMOGLOBIN g/dL 11.3* 11.9*   PLATELETS 10*3/mm3 118* 157      Results from last 7 days   Lab Units 10/21/24  0448 10/17/24  0641   SODIUM mmol/L 143 140   POTASSIUM mmol/L 5.1 4.7   BUN mg/dL 27* 35*   CREATININE mg/dL 1.79* 1.55*         Microbiology Results (last 10 days)       ** No results found for the last 240 hours. **          Recent films:  No radiology results for the last day  Personal review of imaging : CXR shows reviewed recent imaging studies chronic changes noted no acute changes noted.  Pulmonary Assessment:  Acute on chronic hypoxic respiratory failure  Dependence on supplemental oxygen  Bilateral pulm infiltrate/post-COVID syndrome  Secondary bacterial pneumonia  History of interstitial lung disease  Chronic congestive diastolic heart failure with preserved EF.  Chronic obstructive pulmonary disease.  No carcinoma the right upper lobe of the lung with metastasis  Status post chemotherapy and radiation treatment  Allergic rhinitis    Recommend:   Continue current care plan.  Patient doing well and feeling better.  Continue supplemental oxygen 3 L at rest and 5 to 6 L on activity  He is back to his baseline oxygen requirement.  His nasal dryness is better but he still having some occasional bleeding  He is using saline spray and gel multiple times a day which needs to be restricted.  He has chronic lung disease with fibrosis and COPD and also had COVID in the past.  He requires little more oxygen during physical therapy but overall feeling better  Continue physical activity incentive spirometry bronchial treatment and routine respiratory care  Oncology following for history of lung cancer.  No immediate treatment plan.  DVT and stress ulcer prophylaxis and pain and anxiety control  Nutritional support.  Plan for outpatient follow-up in the pulmonary clinic with me in a month after discharge  CODE STATUS: Limited code.  Overall prognosis: Guarded.  We will follow.  Most likely be discharged next week    Electronically signed by     Tarik Crocker  MD,  Pulmonologist/Intensivist   10/23/24, 13:22 CDT

## 2024-10-23 NOTE — PROGRESS NOTES
Inpatient Nutrition Services  Patient Name:  Karoline Prater  YOB: 1940  MRN: [id number]  Admit Date:  10/7/2024  Assessment Date:  10/23/2024   Reason for Assessment       Row Name 10/23/24 1027          Reason for Assessment    Reason For Assessment follow-up protocol     Diagnosis pulmonary disease;cardiac disease;cancer diagnosis/related complications;renal disease                    Nutrition/Diet History       Row Name 10/23/24 1027          Nutrition/Diet History    Typical Intake (Food/Fluid/EN/PN) PO adequate. Wt 163.1lb. Stable x 1 week; approximately 6lb gain since admit. No skin breakdown. Receiving daily menu selections. Continue with current nutrition care plan.                    Labs/Tests/Procedures/Meds       Row Name 10/23/24 1027          Labs/Procedures/Meds    Lab Results Reviewed reviewed        Diagnostic Tests/Procedures    Diagnostic Test/Procedure Reviewed reviewed        Medications    Pertinent Medications Reviewed reviewed                    Physical Findings       Row Name 10/23/24 1027          Physical Findings    Overall Physical Appearance NC, generalized edema, BM 10/21, no skin breakdown                    Estimated/Assessed Needs - Anthropometrics       Row Name 10/23/24 1028          Anthropometrics    Calculation Weight 71.3 kg (157 lb 3 oz)        Estimated/Assessed Needs    Additional Documentation Fluid Requirements (Group);KCAL/KG (Group);Protein Requirements (Group)        KCAL/KG    KCAL/KG 25 Kcal/Kg (kcal)     25 Kcal/Kg (kcal) 1782.5        Protein Requirements    Weight Used For Protein Calculations 71.3 kg (157 lb 3 oz)     Est Protein Requirement Amount (gms/kg) 0.8 gm protein     Estimated Protein Requirements (gms/day) 57.04        Fluid Requirements    Fluid Requirements (mL/day) 1783                    Nutrition Prescription Ordered       Row Name 10/23/24 1028          Nutrition Prescription PO    Current PO Diet Regular     Fluid  Consistency Thin                    Evaluation of Received Nutrient/Fluid Intake       Row Name 10/23/24 1028          Nutrient/Fluid Evaluation    Number of Days Evaluated 3 days        Fluid Intake Evaluation    Oral Fluid (mL) 1588        PO Evaluation    Number of Meals 8     % PO Intake 100                       Problem/Interventions:     Problem 2       Row Name 10/23/24 1028          Nutrition Diagnoses Problem 2    Problem 2 Nutrition Appropriate for Condition at this Time                        Intervention Goal       Row Name 10/23/24 1028          Intervention Goal    General Maintain nutrition     PO Meet estimated needs     Weight Maintain weight                    Nutrition Intervention       Row Name 10/23/24 1028          Nutrition Intervention    RD/Tech Action Care plan reviewd                      Education/Evaluation       Row Name 10/23/24 1028          Education    Education No discharge needs identified at this time        Monitor/Evaluation    Monitor Per protocol                     Electronically signed by:  Avelina Garnica RDN, LD  10/23/24 10:29 CDT

## 2024-10-24 LAB
ANION GAP SERPL CALCULATED.3IONS-SCNC: 10 MMOL/L (ref 5–15)
BUN SERPL-MCNC: 30 MG/DL (ref 8–23)
BUN/CREAT SERPL: 18 (ref 7–25)
CALCIUM SPEC-SCNC: 9.9 MG/DL (ref 8.6–10.5)
CHLORIDE SERPL-SCNC: 106 MMOL/L (ref 98–107)
CO2 SERPL-SCNC: 25 MMOL/L (ref 22–29)
CREAT SERPL-MCNC: 1.67 MG/DL (ref 0.76–1.27)
DEPRECATED RDW RBC AUTO: 68.3 FL (ref 37–54)
EGFRCR SERPLBLD CKD-EPI 2021: 40.6 ML/MIN/1.73
ERYTHROCYTE [DISTWIDTH] IN BLOOD BY AUTOMATED COUNT: 19.3 % (ref 12.3–15.4)
GLUCOSE SERPL-MCNC: 110 MG/DL (ref 65–99)
HCT VFR BLD AUTO: 37.8 % (ref 37.5–51)
HGB BLD-MCNC: 11 G/DL (ref 13–17.7)
MCH RBC QN AUTO: 27.8 PG (ref 26.6–33)
MCHC RBC AUTO-ENTMCNC: 29.1 G/DL (ref 31.5–35.7)
MCV RBC AUTO: 95.7 FL (ref 79–97)
NT-PROBNP SERPL-MCNC: 2493 PG/ML (ref 0–1800)
PLATELET # BLD AUTO: 92 10*3/MM3 (ref 140–450)
PMV BLD AUTO: 12.6 FL (ref 6–12)
POTASSIUM SERPL-SCNC: 4.5 MMOL/L (ref 3.5–5.2)
RBC # BLD AUTO: 3.95 10*6/MM3 (ref 4.14–5.8)
SODIUM SERPL-SCNC: 141 MMOL/L (ref 136–145)
WBC NRBC COR # BLD AUTO: 8.32 10*3/MM3 (ref 3.4–10.8)

## 2024-10-24 PROCEDURE — 63710000001 PREDNISONE PER 5 MG: Performed by: INTERNAL MEDICINE

## 2024-10-24 PROCEDURE — 85027 COMPLETE CBC AUTOMATED: CPT | Performed by: INTERNAL MEDICINE

## 2024-10-24 PROCEDURE — 97535 SELF CARE MNGMENT TRAINING: CPT

## 2024-10-24 PROCEDURE — 97110 THERAPEUTIC EXERCISES: CPT

## 2024-10-24 PROCEDURE — 83880 ASSAY OF NATRIURETIC PEPTIDE: CPT | Performed by: INTERNAL MEDICINE

## 2024-10-24 PROCEDURE — 99232 SBSQ HOSP IP/OBS MODERATE 35: CPT | Performed by: INTERNAL MEDICINE

## 2024-10-24 PROCEDURE — 80048 BASIC METABOLIC PNL TOTAL CA: CPT | Performed by: INTERNAL MEDICINE

## 2024-10-24 PROCEDURE — 97116 GAIT TRAINING THERAPY: CPT

## 2024-10-24 NOTE — PROGRESS NOTES
[facility] PULMONARY AND CRITICAL CARE PROGRESS NOTE - [facility]    Patient: Karoline Prater  1940   MR# [id number]   Acct# [id number]  10/24/24   14:35 CDT  Referring Provider: Edmond Izquierdo MD    Chief Complaint: Shortness of breath, acute on chronic respiratory failure, COPD, oxygen dependent, history of COVID infection the past, need for rehab  Interval history: Patient was seen in the follow-up visit in pulmonary rounds in LTAC unit today.  He remains on 3 L of oxygen and sitting up in the chair.  Still having some problem with nasal dryness but no epistaxis reported.  He is using nasal gel frequently for nasal dryness. Currently getting Pulmicort and DuoNeb.  Working with physical therapy.  He remains afebrile and no other acute events overnight  Meds:    Scheduled IV Meds with Route   acetaminophen, 1,000 mg, Oral, Nightly  aspirin, 81 mg, Oral, Daily  atorvastatin, 20 mg, Oral, Nightly  bisacodyl, 5 mg, Oral, Daily  budesonide, 0.5 mg, Nebulization, BID - RT  busPIRone, 5 mg, Oral, TID With Meals  cetirizine, 10 mg, Oral, Daily  empagliflozin, 10 mg, Oral, Daily  guaiFENesin, 600 mg, Oral, Q12H  HYDROcodone-acetaminophen, 1 tablet, Oral, TID  ipratropium-albuterol, 3 mL, Nebulization, 4x Daily - RT  isosorbide mononitrate, 30 mg, Oral, Daily Before Lunch  melatonin, 10 mg, Oral, Nightly  metoprolol succinate XL, 25 mg, Oral, Daily  pantoprazole, 40 mg, Oral, Daily  predniSONE, 10 mg, Oral, Daily  sodium bicarbonate, 650 mg, Oral, TID  sodium chloride, 2 spray, Each Nare, 5x Daily  tamsulosin, 0.4 mg, Oral, Nightly           Infusion Medications       acetaminophen, 1,000 mg, Oral, Nightly  aspirin, 81 mg, Oral, Daily  atorvastatin, 20 mg, Oral, Nightly  bisacodyl, 5 mg, Oral, Daily  budesonide, 0.5 mg, Nebulization, BID - RT  busPIRone, 5 mg, Oral, TID With Meals  empagliflozin, 10 mg, Oral, Daily  guaiFENesin, 600 mg, Oral, Q12H  HYDROcodone-acetaminophen, 1 tablet, Oral,  TID  ipratropium-albuterol, 3 mL, Nebulization, 4x Daily - RT  isosorbide mononitrate, 30 mg, Oral, Daily Before Lunch  melatonin, 10 mg, Oral, Nightly  metoprolol succinate XL, 25 mg, Oral, Daily  pantoprazole, 40 mg, Oral, Daily  predniSONE, 10 mg, Oral, Daily  sodium bicarbonate, 650 mg, Oral, TID  sodium chloride, 2 spray, Each Nare, 5x Daily  tamsulosin, 0.4 mg, Oral, Nightly      sodium chloride, 2 spray      Physical Exam:  Vital signs reviewed all stable patient is on 3 L of oxygen and oxygen saturation 97% he needed 7 L oxygen on activity.  There were no vitals filed for this visit.  Body mass index is 28 kg/m².    No intake or output data in the 24 hours ending 10/24/24 1435  Physical Exam    Vitals and nursing note reviewed.   Constitutional:       General: He is not in acute distress.     Appearance: He is ill-appearing. He is not toxic-appearing or diaphoretic.      Comments: Pleasant elderly  gentleman sitting up in the chair   HENT:      Head: Normocephalic and atraumatic.      Right Ear: Tympanic membrane and external ear normal. There is no impacted cerumen.      Left Ear: Tympanic membrane and external ear normal. There is no impacted cerumen.      Nose: Nose normal. Congestion present. No rhinorrhea.      Comments: Nosebleed noted mostly from the left nostril with clotted blood.     Mouth/Throat:      Mouth: Mucous membranes are moist.      Pharynx: Oropharynx is clear. No oropharyngeal exudate or posterior oropharyngeal erythema.   Eyes:      General: No scleral icterus.        Right eye: No discharge.         Left eye: No discharge.      Extraocular Movements: Extraocular movements intact.      Conjunctiva/sclera: Conjunctivae normal.      Pupils: Pupils are equal, round, and reactive to light.   Neck:      Vascular: No carotid bruit.   Cardiovascular:      Rate and Rhythm: Regular rhythm. Tachycardia present.      Pulses: Normal pulses.      Heart sounds: Normal heart sounds. No murmur  heard.     No friction rub. No gallop.   Pulmonary:      Effort: Pulmonary effort is normal. No respiratory distress.      Breath sounds: No stridor. Wheezing present. No rhonchi or rales.      Comments: Decreased bilateral breath sounds  Chest:      Chest wall: No tenderness.   Abdominal:      General: Abdomen is flat. Bowel sounds are normal. There is no distension.      Palpations: Abdomen is soft. There is no mass.      Tenderness: There is no abdominal tenderness. There is no guarding or rebound.      Hernia: No hernia is present.   Genitourinary:     Comments: Not examined.  Musculoskeletal:         General: No swelling, tenderness, deformity or signs of injury. Normal range of motion.      Cervical back: Normal range of motion and neck supple. No rigidity or tenderness.      Right lower leg: No edema.      Left lower leg: No edema.   Lymphadenopathy:      Cervical: No cervical adenopathy.   Skin:     General: Skin is warm.      Capillary Refill: Capillary refill takes less than 2 seconds.      Coloration: Skin is not jaundiced or pale.      Findings: No bruising, erythema, lesion or rash.   Neurological:      General: No focal deficit present.      Mental Status: He is alert and oriented to person, place, and time.      Cranial Nerves: No cranial nerve deficit.      Sensory: No sensory deficit.      Motor: Weakness present.      Deep Tendon Reflexes: Reflexes normal.      Comments: Hearing impairment noted   Psychiatric:         Mood and Affect: Mood normal.         Behavior: Behavior normal.         Thought Content: Thought content normal.         Judgment: Judgment normal.     Laboratory Data:  Results from last 7 days   Lab Units 10/24/24  0355 10/21/24  0448   WBC 10*3/mm3 8.32 9.35   HEMOGLOBIN g/dL 11.0* 11.3*   PLATELETS 10*3/mm3 92* 118*     Results from last 7 days   Lab Units 10/24/24  0355 10/21/24  0448   SODIUM mmol/L 141 143   POTASSIUM mmol/L 4.5 5.1   BUN mg/dL 30* 27*   CREATININE mg/dL 1.67*  1.79*         Microbiology Results (last 10 days)       ** No results found for the last 240 hours. **          Recent films:  No radiology results for the last day  Personal review of imaging : CXR shows reviewed recent imaging studies chronic changes noted no acute changes noted.  Pulmonary Assessment:  Acute on chronic hypoxic respiratory failure  Dependence on supplemental oxygen  Bilateral pulm infiltrate/post-COVID syndrome  Secondary bacterial pneumonia  History of interstitial lung disease  Chronic congestive diastolic heart failure with preserved EF.  Chronic obstructive pulmonary disease.  No carcinoma the right upper lobe of the lung with metastasis  Status post chemotherapy and radiation treatment  Allergic rhinitis    Recommend:   His respiratory status is stable and his oxygen requirement is down to the baseline.  Continue supplemental oxygen 3 L at rest and 5 to 6 L on activity  His nasal dryness is better but he still having some occasional bleeding  He is using saline spray and gel multiple times a day .  Use humidified oxygen  Patient has history of chronic lung disease with fibrosis and COPD and also had COVID in the past.  He requires little more oxygen during physical therapy but overall feeling better  Continue physical activity incentive spirometry bronchial treatment and routine respiratory care  Oncology following for history of lung cancer.  No immediate treatment plan.  DVT and stress ulcer prophylaxis and pain and anxiety control.  Anticipated discharge next week.  Nutritional support.  Plan for outpatient follow-up in the pulmonary clinic with me in a month after discharge  CODE STATUS: Limited code.  Overall prognosis: Guarded.  We will follow.    Electronically signed by     Tarik Crocker MD,  Pulmonologist/Intensivist   10/24/24, 14:35 CDT

## 2024-10-24 NOTE — PROGRESS NOTES
ROBSON Borjas APRN        Internal Medicine Progress Note    10/24/2024   11:37 CDT    Name:  Karoline Prater  MRN:    [id number]     Acct:     [id number]   Room:  [address] [place] [address] Day: 0     Admit Date: 10/7/2024  4:53 PM  PCP: Irving Alexis MD    Subjective:     C/C: weakness, shortness of breath    Interval History: Status:  improved. Up to chair in hallway. No family at bedside. Afebrile. Maintaining adequate 02 sats with 02 at 4 lpm at rest. Requiring up to 10 lpm with activity and still desatting into the 80s. Reporting decreased recovery time. Denies pain. Progressing with therapies. Tolerating treatment thus far. Counts stable.     Review of Systems   Constitutional: Positive for malaise/fatigue. Negative for chills, decreased appetite, weight gain and weight loss.   HENT:  Negative for congestion, ear discharge, hoarse voice and tinnitus.    Eyes:  Negative for blurred vision, discharge, visual disturbance and visual halos.   Cardiovascular:  Positive for dyspnea on exertion. Negative for chest pain, claudication, irregular heartbeat, leg swelling, orthopnea and paroxysmal nocturnal dyspnea.   Respiratory:  Positive for shortness of breath. Negative for cough, sputum production and wheezing.    Endocrine: Negative for cold intolerance, heat intolerance and polyuria.   Hematologic/Lymphatic: Negative for adenopathy. Does not bruise/bleed easily.   Skin:  Negative for dry skin, itching and suspicious lesions.   Musculoskeletal:  Negative for arthritis, back pain, falls, joint pain, muscle weakness and myalgias.   Gastrointestinal:  Negative for abdominal pain, constipation, diarrhea, dysphagia and hematemesis.   Genitourinary:  Negative for bladder incontinence, dysuria and frequency.   Neurological:  Positive for weakness. Negative for aphonia, disturbances in coordination and dizziness.   Psychiatric/Behavioral:  Negative for altered mental status,  depression, memory loss and substance abuse. The patient does not have insomnia and is not nervous/anxious.          Medications:     Allergies:   Allergies   Allergen Reactions    Penicillins Hives       Current Meds:   Current Facility-Administered Medications:     acetaminophen (TYLENOL) tablet 1,000 mg, 1,000 mg, Oral, Nightly, Edmond Izquierdo MD    aspirin EC tablet 81 mg, 81 mg, Oral, Daily, Edmond Izquierdo MD    atorvastatin (LIPITOR) tablet 20 mg, 20 mg, Oral, Nightly, Edmond Izquierdo MD    bisacodyl (DULCOLAX) EC tablet 5 mg, 5 mg, Oral, Daily, Sil Jensen APRN    bisacodyl (DULCOLAX) suppository 10 mg, 10 mg, Rectal, Daily PRN, Edmond Izquierdo MD    budesonide (PULMICORT) nebulizer solution 0.5 mg, 0.5 mg, Nebulization, BID - RT, Tarik Crocker MD    busPIRone (BUSPAR) tablet 5 mg, 5 mg, Oral, TID With Meals, Edmond Izquierdo MD    diphenhydrAMINE (BENADRYL) capsule 25 mg, 25 mg, Oral, Nightly PRN, Edmond Izquierdo MD    empagliflozin (JARDIANCE) tablet 10 mg, 10 mg, Oral, Daily, Edmond Izquierdo MD    guaiFENesin (MUCINEX) 12 hr tablet 600 mg, 600 mg, Oral, Q12H, Edmond Izquierdo MD    HYDROcodone-acetaminophen (NORCO) 5-325 MG per tablet 1 tablet, 1 tablet, Oral, TID, Edmond Izquierdo MD    hydrocortisone (ANUSOL-HC) suppository 25 mg, 25 mg, Rectal, Q12H PRN, Edmond Izquierdo MD    ipratropium-albuterol (DUO-NEB) nebulizer solution 3 mL, 3 mL, Nebulization, 4x Daily - RT, Edmond Izquierdo MD    isosorbide mononitrate (IMDUR) 24 hr tablet 30 mg, 30 mg, Oral, Daily Before Lunch, Edmond Izquierdo MD    melatonin tablet 10 mg, 10 mg, Oral, Nightly, Edmond Izquierdo MD    metoprolol succinate XL (TOPROL-XL) 24 hr tablet 25 mg, 25 mg, Oral, Daily, Edmond Izquierdo MD    nitroglycerin (NITROSTAT) SL tablet 0.4 mg, 0.4 mg, Sublingual, Q5 Min PRN, Edmond Izquierdo MD    ondansetron ODT (ZOFRAN-ODT)  "disintegrating tablet 4 mg, 4 mg, Oral, Q6H PRN **OR** ondansetron (ZOFRAN) injection 4 mg, 4 mg, Intravenous, Q6H PRN, Edmond Izquierdo MD    pantoprazole (PROTONIX) EC tablet 40 mg, 40 mg, Oral, Daily, Edmond Izquierdo MD    polyethylene glycol (MIRALAX) packet 17 g, 17 g, Oral, Daily PRN, Edmond Izquierdo MD    predniSONE (DELTASONE) tablet 10 mg, 10 mg, Oral, Daily, Gareth Becerra MD    sodium bicarbonate tablet 650 mg, 650 mg, Oral, TID, Edmond Izquierdo MD    sodium chloride 0.9 % bolus 250 mL, 250 mL, Intravenous, PRN, Edmond Izquierdo MD    sodium chloride nasal spray 2 spray, 2 spray, Each Nare, 5x Daily, Edmond Izquierdo MD    sodium chloride nasal spray 2 spray, 2 spray, Each Nare, Continuous PRN, Edmond Izquierdo MD    tamsulosin (FLOMAX) 24 hr capsule 0.4 mg, 0.4 mg, Oral, Nightly, Edmond Izquierdo MD    traZODone (DESYREL) tablet 25 mg, 25 mg, Oral, Nightly PRN, Edmond Izquierdo MD    Data:     Code Status:    There are no questions and answers to display.       Family History   Problem Relation Age of Onset    Hypertension Mother     Leukemia Father        Social History     Socioeconomic History    Marital status: Single   Tobacco Use    Smoking status: Former     Current packs/day: 0.00     Types: Cigarettes     Start date: 10/29/1954     Quit date: 10/29/2003     Years since quittin.0    Smokeless tobacco: Former     Types: Chew     Quit date:    Vaping Use    Vaping status: Never Used   Substance and Sexual Activity    Alcohol use: No    Drug use: No    Sexual activity: Defer       Vitals:  Ht 162.6 cm (64\")   Wt 74 kg (163 lb 1.6 oz)   BMI 28.00 kg/m²   T 98.7 P 70 R 28  /81 Sp02 95% (4 lpm)          I/O (24Hr):  No intake or output data in the 24 hours ending 10/24/24 1137    Labs and imaging:      Recent Results (from the past 12 hours)   BNP    Collection Time: 10/24/24  3:55 AM    Specimen: Blood   Result Value Ref " Range    proBNP 2,493.0 (H) 0.0 - 1,800.0 pg/mL   CBC (No Diff)    Collection Time: 10/24/24  3:55 AM    Specimen: Blood   Result Value Ref Range    WBC 8.32 3.40 - 10.80 10*3/mm3    RBC 3.95 (L) 4.14 - 5.80 10*6/mm3    Hemoglobin 11.0 (L) 13.0 - 17.7 g/dL    Hematocrit 37.8 37.5 - 51.0 %    MCV 95.7 79.0 - 97.0 fL    MCH 27.8 26.6 - 33.0 pg    MCHC 29.1 (L) 31.5 - 35.7 g/dL    RDW 19.3 (H) 12.3 - 15.4 %    RDW-SD 68.3 (H) 37.0 - 54.0 fl    MPV 12.6 (H) 6.0 - 12.0 fL    Platelets 92 (L) 140 - 450 10*3/mm3   Basic Metabolic Panel    Collection Time: 10/24/24  3:55 AM    Specimen: Blood   Result Value Ref Range    Glucose 110 (H) 65 - 99 mg/dL    BUN 30 (H) 8 - 23 mg/dL    Creatinine 1.67 (H) 0.76 - 1.27 mg/dL    Sodium 141 136 - 145 mmol/L    Potassium 4.5 3.5 - 5.2 mmol/L    Chloride 106 98 - 107 mmol/L    CO2 25.0 22.0 - 29.0 mmol/L    Calcium 9.9 8.6 - 10.5 mg/dL    BUN/Creatinine Ratio 18.0 7.0 - 25.0    Anion Gap 10.0 5.0 - 15.0 mmol/L    eGFR 40.6 (L) >60.0 mL/min/1.73                                       Physical Examination:        Physical Exam  Vitals and nursing note reviewed.   Constitutional:       Appearance: He is well-developed.   HENT:      Head: Normocephalic and atraumatic.      Nose: Nose normal.      Mouth/Throat:      Mouth: Mucous membranes are moist.      Pharynx: Oropharynx is clear.   Eyes:      Pupils: Pupils are equal, round, and reactive to light.      Comments: Left eye enucleated   Cardiovascular:      Rate and Rhythm: Normal rate and regular rhythm.      Heart sounds: Normal heart sounds.   Pulmonary:      Effort: Pulmonary effort is normal.      Breath sounds: Normal breath sounds.   Abdominal:      General: Bowel sounds are normal.      Palpations: Abdomen is soft.   Musculoskeletal:         General: Normal range of motion.      Cervical back: Normal range of motion and neck supple.      Comments: Generalized weakness   Skin:     General: Skin is warm and dry.   Neurological:       Mental Status: He is alert and oriented to person, place, and time.      Deep Tendon Reflexes: Reflexes are normal and symmetric.   Psychiatric:         Behavior: Behavior normal.           Assessment:             Stage 3b chronic kidney disease    A-fib    Former smoker    COPD (chronic obstructive pulmonary disease)    History of radiation therapy    Chronic heart failure with preserved ejection fraction (HFpEF)    Atrial flutter    COVID-19 virus infection    Acute-on-chronic respiratory failure    Epistaxis    Chronic rhinitis    History of pneumonia    Supplemental oxygen dependent    Past Medical History:   Diagnosis Date    Arthritis     Atrial fibrillation with rapid ventricular response 05/31/2019    Blindness of left eye     BPH with obstruction/lower urinary tract symptoms     Cancer     stomach & lung    CHF (congestive heart failure)     CKD (chronic kidney disease) stage 3, GFR 30-59 ml/min     COPD with acute exacerbation 08/03/2024    Coronary artery disease     Elevated cholesterol     Essential hypertension 10/02/2017    Fibrotic lung diseases     GERD (gastroesophageal reflux disease)     Hearing loss     History of transfusion     Hydronephrosis of left kidney     Hyperlipidemia     Hypertension     pt was taken off of all of his medications for BP (atenolol, lisinopril, lasix) because his BP kept bottoming out so his primary dr told him to discontinue them 1-2 months ago (Jan/Feb 2019). pt states he takes no medications currently.    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 Patel is just going to keep an eye on it and not do surgery right now.    Mediastinal adenopathy 10/24/2018    [facility]    Monoclonal gammopathy of unknown significance (MGUS) 09/11/2018    Pancreatic mass     pt states he had this in 2013 but it went away on its own.  Now recent CT shows it has come back so he is going to have an ultrasound on 3/13/19.    Shortness of breath         Plan:        Acute on chronic hypoxic respiratory failure  Stage 4 metastatic non small cell lung cancer  COPD  Interstitial lung disease  Chronic diastolic CHF  CKD3  Hyperkalemia  Multifactorial anemia  Anxiety  Hemorrhoids  Constipation    Continue current treatment. Monitor counts. Increase activity. Labs Monday. Continue oxygen weaning as tolerated under direction of pulmonology. Aggressive therapies as tolerated. Maintain patient safety. Adjust bowel regimen as needed.       Electronically signed by MARITZA Greenwood on 10/24/2024 at 11:37 CDT

## 2024-10-25 PROCEDURE — 99233 SBSQ HOSP IP/OBS HIGH 50: CPT | Performed by: INTERNAL MEDICINE

## 2024-10-25 PROCEDURE — 63710000001 PREDNISONE PER 5 MG: Performed by: INTERNAL MEDICINE

## 2024-10-25 PROCEDURE — 97116 GAIT TRAINING THERAPY: CPT

## 2024-10-25 PROCEDURE — 97530 THERAPEUTIC ACTIVITIES: CPT

## 2024-10-25 NOTE — PROGRESS NOTES
Timbo Izquierdo M.D.  MARITZA Frias        Internal Medicine Progress Note    10/25/2024   10:05 CDT    Name:  Karoline Prater  MRN:    [id number]     Acct:     [id number]   Room:  [address] [place] [address] Day: 0     Admit Date: 10/7/2024  4:53 PM  PCP: Irving Alexis MD    Subjective:     C/C: weakness, shortness of breath    Interval History: Status:  improved. Resting in bed. Visitor at bedside. Afebrile. Maintaining adequate 02 sats with 02 at 4 lpm at rest.  Denies pain. Progressing with therapies. Tolerating treatment thus far. Anxious for therapies today.     Review of Systems   Constitutional: Positive for malaise/fatigue. Negative for chills, decreased appetite, weight gain and weight loss.   HENT:  Negative for congestion, ear discharge, hoarse voice and tinnitus.    Eyes:  Negative for blurred vision, discharge, visual disturbance and visual halos.   Cardiovascular:  Positive for dyspnea on exertion. Negative for chest pain, claudication, irregular heartbeat, leg swelling, orthopnea and paroxysmal nocturnal dyspnea.   Respiratory:  Positive for shortness of breath. Negative for cough, sputum production and wheezing.    Endocrine: Negative for cold intolerance, heat intolerance and polyuria.   Hematologic/Lymphatic: Negative for adenopathy. Does not bruise/bleed easily.   Skin:  Negative for dry skin, itching and suspicious lesions.   Musculoskeletal:  Negative for arthritis, back pain, falls, joint pain, muscle weakness and myalgias.   Gastrointestinal:  Negative for abdominal pain, constipation, diarrhea, dysphagia and hematemesis.   Genitourinary:  Negative for bladder incontinence, dysuria and frequency.   Neurological:  Positive for weakness. Negative for aphonia, disturbances in coordination and dizziness.   Psychiatric/Behavioral:  Negative for altered mental status, depression, memory loss and substance abuse. The patient does not have insomnia and is not nervous/anxious.           Medications:     Allergies:   Allergies   Allergen Reactions    Penicillins Hives       Current Meds:   Current Facility-Administered Medications:     acetaminophen (TYLENOL) tablet 1,000 mg, 1,000 mg, Oral, Nightly, Edmond Izquierdo MD    aspirin EC tablet 81 mg, 81 mg, Oral, Daily, Edmond Izquierdo MD    atorvastatin (LIPITOR) tablet 20 mg, 20 mg, Oral, Nightly, Edmond Izquierdo MD    bisacodyl (DULCOLAX) EC tablet 5 mg, 5 mg, Oral, Daily, Sil Jensen APRN    bisacodyl (DULCOLAX) suppository 10 mg, 10 mg, Rectal, Daily PRN, Edmond Izquierdo MD    budesonide (PULMICORT) nebulizer solution 0.5 mg, 0.5 mg, Nebulization, BID - RT, Tarik Crocker MD    busPIRone (BUSPAR) tablet 5 mg, 5 mg, Oral, TID With Meals, Edmond Izquierdo MD    diphenhydrAMINE (BENADRYL) capsule 25 mg, 25 mg, Oral, Nightly PRN, Edmond Izquierdo MD    empagliflozin (JARDIANCE) tablet 10 mg, 10 mg, Oral, Daily, Edmond Izquierdo MD    guaiFENesin (MUCINEX) 12 hr tablet 600 mg, 600 mg, Oral, Q12H, Edmond Izquierdo MD    HYDROcodone-acetaminophen (NORCO) 5-325 MG per tablet 1 tablet, 1 tablet, Oral, TID, Edmond Izquierdo MD    hydrocortisone (ANUSOL-HC) suppository 25 mg, 25 mg, Rectal, Q12H PRN, Edmond Izquierdo MD    ipratropium-albuterol (DUO-NEB) nebulizer solution 3 mL, 3 mL, Nebulization, 4x Daily - RT, Edmond Izquierdo MD    isosorbide mononitrate (IMDUR) 24 hr tablet 30 mg, 30 mg, Oral, Daily Before Lunch, Edmond Izquierdo MD    melatonin tablet 10 mg, 10 mg, Oral, Nightly, Edmond Izquierdo MD    metoprolol succinate XL (TOPROL-XL) 24 hr tablet 25 mg, 25 mg, Oral, Daily, Edmond Izquierdo MD    nitroglycerin (NITROSTAT) SL tablet 0.4 mg, 0.4 mg, Sublingual, Q5 Min PRN, Edmond Izquierdo MD    ondansetron ODT (ZOFRAN-ODT) disintegrating tablet 4 mg, 4 mg, Oral, Q6H PRN **OR** ondansetron (ZOFRAN) injection 4 mg, 4 mg, Intravenous, Q6H  "PRN, Edmond Izquierdo MD    pantoprazole (PROTONIX) EC tablet 40 mg, 40 mg, Oral, Daily, Edmond Izquierdo MD    polyethylene glycol (MIRALAX) packet 17 g, 17 g, Oral, Daily PRN, Edmond Izquierdo MD    predniSONE (DELTASONE) tablet 10 mg, 10 mg, Oral, Daily, Gareth Becerra MD    sodium bicarbonate tablet 650 mg, 650 mg, Oral, TID, Edmond Izquierdo MD    sodium chloride 0.9 % bolus 250 mL, 250 mL, Intravenous, PRN, Edmond Izquierdo MD    sodium chloride nasal spray 2 spray, 2 spray, Each Nare, 5x Daily, Edmond Izquierdo MD    sodium chloride nasal spray 2 spray, 2 spray, Each Nare, Continuous PRN, Edmond Izquierdo MD    tamsulosin (FLOMAX) 24 hr capsule 0.4 mg, 0.4 mg, Oral, Nightly, Edmond Izquierdo MD    traZODone (DESYREL) tablet 25 mg, 25 mg, Oral, Nightly PRN, Edmond Izquierdo MD    Data:     Code Status:    There are no questions and answers to display.       Family History   Problem Relation Age of Onset    Hypertension Mother     Leukemia Father        Social History     Socioeconomic History    Marital status: Single   Tobacco Use    Smoking status: Former     Current packs/day: 0.00     Types: Cigarettes     Start date: 10/29/1954     Quit date: 10/29/2003     Years since quittin.0    Smokeless tobacco: Former     Types: Chew     Quit date:    Vaping Use    Vaping status: Never Used   Substance and Sexual Activity    Alcohol use: No    Drug use: No    Sexual activity: Defer       Vitals:  Ht 162.6 cm (64\")   Wt 74 kg (163 lb 1.6 oz)   BMI 28.00 kg/m²   T 98.5 P 71 R 30  /70 Sp02 97% (3 lpm)          I/O (24Hr):  No intake or output data in the 24 hours ending 10/25/24 1005    Labs and imaging:      No results found for this or any previous visit (from the past 12 hours).                                      Physical Examination:        Physical Exam  Vitals and nursing note reviewed.   Constitutional:       Appearance: He is " well-developed.   HENT:      Head: Normocephalic and atraumatic.      Nose: Nose normal.      Mouth/Throat:      Mouth: Mucous membranes are moist.      Pharynx: Oropharynx is clear.   Eyes:      Pupils: Pupils are equal, round, and reactive to light.      Comments: Left eye enucleated   Cardiovascular:      Rate and Rhythm: Normal rate and regular rhythm.      Heart sounds: Normal heart sounds.   Pulmonary:      Effort: Pulmonary effort is normal.      Breath sounds: Normal breath sounds.   Abdominal:      General: Bowel sounds are normal.      Palpations: Abdomen is soft.   Musculoskeletal:         General: Normal range of motion.      Cervical back: Normal range of motion and neck supple.      Comments: Generalized weakness   Skin:     General: Skin is warm and dry.   Neurological:      Mental Status: He is alert and oriented to person, place, and time.      Deep Tendon Reflexes: Reflexes are normal and symmetric.   Psychiatric:         Behavior: Behavior normal.           Assessment:             Stage 3b chronic kidney disease    A-fib    Former smoker    COPD (chronic obstructive pulmonary disease)    History of radiation therapy    Chronic heart failure with preserved ejection fraction (HFpEF)    Atrial flutter    COVID-19 virus infection    Acute-on-chronic respiratory failure    Epistaxis    Chronic rhinitis    History of pneumonia    Supplemental oxygen dependent    Past Medical History:   Diagnosis Date    Arthritis     Atrial fibrillation with rapid ventricular response 05/31/2019    Blindness of left eye     BPH with obstruction/lower urinary tract symptoms     Cancer     stomach & lung    CHF (congestive heart failure)     CKD (chronic kidney disease) stage 3, GFR 30-59 ml/min     COPD with acute exacerbation 08/03/2024    Coronary artery disease     Elevated cholesterol     Essential hypertension 10/02/2017    Fibrotic lung diseases     GERD (gastroesophageal reflux disease)     Hearing loss      History of transfusion     Hydronephrosis of left kidney     Hyperlipidemia     Hypertension     pt was taken off of all of his medications for BP (atenolol, lisinopril, lasix) because his BP kept bottoming out so his primary dr told him to discontinue them 1-2 months ago (Jan/Feb 2019). pt states he takes no medications currently.    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 Patel is just going to keep an eye on it and not do surgery right now.    Mediastinal adenopathy 10/24/2018    [facility]    Monoclonal gammopathy of unknown significance (MGUS) 09/11/2018    Pancreatic mass     pt states he had this in 2013 but it went away on its own. Now recent CT shows it has come back so he is going to have an ultrasound on 3/13/19.    Shortness of breath         Plan:        Acute on chronic hypoxic respiratory failure  Stage 4 metastatic non small cell lung cancer  COPD  Interstitial lung disease  Chronic diastolic CHF  CKD3  Hyperkalemia  Multifactorial anemia  Anxiety  Hemorrhoids  Constipation    Continue current treatment. Monitor counts. Increase activity. Labs Monday. Continue oxygen weaning as tolerated under direction of pulmonology. Aggressive therapies as tolerated. Maintain patient safety. Adjust bowel regimen as needed.       Electronically signed by MARITZA Greenwood on 10/25/2024 at 10:05 CDT

## 2024-10-26 PROCEDURE — 97116 GAIT TRAINING THERAPY: CPT

## 2024-10-26 PROCEDURE — 97110 THERAPEUTIC EXERCISES: CPT

## 2024-10-26 PROCEDURE — 63710000001 PREDNISONE PER 5 MG: Performed by: INTERNAL MEDICINE

## 2024-10-26 PROCEDURE — 99232 SBSQ HOSP IP/OBS MODERATE 35: CPT | Performed by: INTERNAL MEDICINE

## 2024-10-26 PROCEDURE — 63710000001 DIPHENHYDRAMINE PER 50 MG: Performed by: INTERNAL MEDICINE

## 2024-10-26 NOTE — PROGRESS NOTES
ROBSON Borjas APRN        Internal Medicine Progress Note    10/26/2024   07:32 CDT    Name:  Karoline Prater  MRN:    [id number]     Acct:     [id number]   Room:  [address] [place] [address] Day: 0     Admit Date: 10/7/2024  4:53 PM  PCP: Irving Alexis MD    Subjective:     C/C: weakness, shortness of breath    Interval History: Status:  improved. Resting in bed. No family at bedside. Complains of penis itching.  Scrotum is red no lesions noted. Afebrile. No other complaints.     Review of Systems   Constitutional: Positive for malaise/fatigue. Negative for chills, decreased appetite, weight gain and weight loss.   HENT:  Negative for congestion, ear discharge, hoarse voice and tinnitus.    Eyes:  Negative for blurred vision, discharge, visual disturbance and visual halos.   Cardiovascular:  Positive for dyspnea on exertion. Negative for chest pain, claudication, irregular heartbeat, leg swelling, orthopnea and paroxysmal nocturnal dyspnea.   Respiratory:  Positive for shortness of breath. Negative for cough, sputum production and wheezing.    Endocrine: Negative for cold intolerance, heat intolerance and polyuria.   Hematologic/Lymphatic: Negative for adenopathy. Does not bruise/bleed easily.   Skin:  Negative for dry skin, itching and suspicious lesions.   Musculoskeletal:  Negative for arthritis, back pain, falls, joint pain, muscle weakness and myalgias.   Gastrointestinal:  Negative for abdominal pain, constipation, diarrhea, dysphagia and hematemesis.   Genitourinary:  Negative for bladder incontinence, dysuria and frequency.   Neurological:  Positive for weakness. Negative for aphonia, disturbances in coordination and dizziness.   Psychiatric/Behavioral:  Negative for altered mental status, depression, memory loss and substance abuse. The patient does not have insomnia and is not nervous/anxious.          Medications:     Allergies:   Allergies   Allergen Reactions     Penicillins Hives       Current Meds:   Current Facility-Administered Medications:     acetaminophen (TYLENOL) tablet 1,000 mg, 1,000 mg, Oral, Nightly, Edmond Izquierdo MD    aspirin EC tablet 81 mg, 81 mg, Oral, Daily, Edmond Izquierdo MD    atorvastatin (LIPITOR) tablet 20 mg, 20 mg, Oral, Nightly, Edmond Izquierdo MD    bisacodyl (DULCOLAX) EC tablet 5 mg, 5 mg, Oral, Daily, Sil Jensen APRN    bisacodyl (DULCOLAX) suppository 10 mg, 10 mg, Rectal, Daily PRN, Edmond Izquierdo MD    budesonide (PULMICORT) nebulizer solution 0.5 mg, 0.5 mg, Nebulization, BID - RT, Tarik Crocker MD    busPIRone (BUSPAR) tablet 5 mg, 5 mg, Oral, TID With Meals, Edmond Izquierdo MD    diphenhydrAMINE (BENADRYL) capsule 25 mg, 25 mg, Oral, Nightly PRN, Edmond Izquierdo MD    empagliflozin (JARDIANCE) tablet 10 mg, 10 mg, Oral, Daily, Edmond Izquierdo MD    guaiFENesin (MUCINEX) 12 hr tablet 600 mg, 600 mg, Oral, Q12H, Edmond Izquierdo MD    HYDROcodone-acetaminophen (NORCO) 5-325 MG per tablet 1 tablet, 1 tablet, Oral, TID, Edmond Izquierdo MD    hydrocortisone (ANUSOL-HC) suppository 25 mg, 25 mg, Rectal, Q12H PRN, Edmond Izquierdo MD    ipratropium-albuterol (DUO-NEB) nebulizer solution 3 mL, 3 mL, Nebulization, 4x Daily - RT, Edmond Izquierdo MD    isosorbide mononitrate (IMDUR) 24 hr tablet 30 mg, 30 mg, Oral, Daily Before Lunch, Edmond Izquierdo MD    melatonin tablet 10 mg, 10 mg, Oral, Nightly, Edmond Izquierdo MD    metoprolol succinate XL (TOPROL-XL) 24 hr tablet 25 mg, 25 mg, Oral, Daily, Edmond Izquierdo MD    nitroglycerin (NITROSTAT) SL tablet 0.4 mg, 0.4 mg, Sublingual, Q5 Min PRN, Edmond Izquierdo MD    ondansetron ODT (ZOFRAN-ODT) disintegrating tablet 4 mg, 4 mg, Oral, Q6H PRN **OR** ondansetron (ZOFRAN) injection 4 mg, 4 mg, Intravenous, Q6H PRN, Edmond Izquierdo MD    pantoprazole (PROTONIX) EC tablet 40 mg,  "40 mg, Oral, Daily, Edmond Izquierdo MD    polyethylene glycol (MIRALAX) packet 17 g, 17 g, Oral, Daily PRN, Edmond Izquierdo MD    predniSONE (DELTASONE) tablet 10 mg, 10 mg, Oral, Daily, Gareth Becerra MD    sodium bicarbonate tablet 650 mg, 650 mg, Oral, TID, Edmond Izquierdo MD    sodium chloride 0.9 % bolus 250 mL, 250 mL, Intravenous, PRN, Edmond Izquierdo MD    sodium chloride nasal spray 2 spray, 2 spray, Each Nare, 5x Daily, Edmond Izquierdo MD    sodium chloride nasal spray 2 spray, 2 spray, Each Nare, Continuous PRN, Edmond Izquierdo MD    tamsulosin (FLOMAX) 24 hr capsule 0.4 mg, 0.4 mg, Oral, Nightly, Edmond Izquierdo MD    traZODone (DESYREL) tablet 25 mg, 25 mg, Oral, Nightly PRN, Edmond Izquierdo MD    Data:     Code Status:    There are no questions and answers to display.       Family History   Problem Relation Age of Onset    Hypertension Mother     Leukemia Father        Social History     Socioeconomic History    Marital status: Single   Tobacco Use    Smoking status: Former     Current packs/day: 0.00     Types: Cigarettes     Start date: 10/29/1954     Quit date: 10/29/2003     Years since quittin.0    Smokeless tobacco: Former     Types: Chew     Quit date:    Vaping Use    Vaping status: Never Used   Substance and Sexual Activity    Alcohol use: No    Drug use: No    Sexual activity: Defer       Vitals:  Ht 162.6 cm (64\")   Wt 74 kg (163 lb 1.6 oz)   BMI 28.00 kg/m²   T 98.1 P 70 R 16  /71 Sp02 97% (3 lpm)          I/O (24Hr):  No intake or output data in the 24 hours ending 10/26/24 0732    Labs and imaging:      No results found for this or any previous visit (from the past 12 hours).                                      Physical Examination:        Physical Exam  Vitals and nursing note reviewed.   Constitutional:       Appearance: He is well-developed.   HENT:      Head: Normocephalic and atraumatic.      Nose: Nose " normal.      Mouth/Throat:      Mouth: Mucous membranes are moist.      Pharynx: Oropharynx is clear.   Eyes:      Pupils: Pupils are equal, round, and reactive to light.      Comments: Left eye enucleated   Cardiovascular:      Rate and Rhythm: Normal rate and regular rhythm.      Heart sounds: Normal heart sounds.   Pulmonary:      Effort: Pulmonary effort is normal.      Breath sounds: Normal breath sounds.   Abdominal:      General: Bowel sounds are normal.      Palpations: Abdomen is soft.   Musculoskeletal:         General: Normal range of motion.      Cervical back: Normal range of motion and neck supple.      Comments: Generalized weakness   Skin:     General: Skin is warm and dry.   Neurological:      Mental Status: He is alert and oriented to person, place, and time.      Deep Tendon Reflexes: Reflexes are normal and symmetric.   Psychiatric:         Behavior: Behavior normal.           Assessment:             Stage 3b chronic kidney disease    A-fib    Former smoker    COPD (chronic obstructive pulmonary disease)    History of radiation therapy    Chronic heart failure with preserved ejection fraction (HFpEF)    Atrial flutter    COVID-19 virus infection    Acute-on-chronic respiratory failure    Epistaxis    Chronic rhinitis    History of pneumonia    Supplemental oxygen dependent    Past Medical History:   Diagnosis Date    Arthritis     Atrial fibrillation with rapid ventricular response 05/31/2019    Blindness of left eye     BPH with obstruction/lower urinary tract symptoms     Cancer     stomach & lung    CHF (congestive heart failure)     CKD (chronic kidney disease) stage 3, GFR 30-59 ml/min     COPD with acute exacerbation 08/03/2024    Coronary artery disease     Elevated cholesterol     Essential hypertension 10/02/2017    Fibrotic lung diseases     GERD (gastroesophageal reflux disease)     Hearing loss     History of transfusion     Hydronephrosis of left kidney     Hyperlipidemia      Hypertension     pt was taken off of all of his medications for BP (atenolol, lisinopril, lasix) because his BP kept bottoming out so his primary dr told him to discontinue them 1-2 months ago (Jan/Feb 2019). pt states he takes no medications currently.    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 Patel is just going to keep an eye on it and not do surgery right now.    Mediastinal adenopathy 10/24/2018    Station 7    Monoclonal gammopathy of unknown significance (MGUS) 09/11/2018    Pancreatic mass     pt states he had this in 2013 but it went away on its own. Now recent CT shows it has come back so he is going to have an ultrasound on 3/13/19.    Shortness of breath         Plan:        Acute on chronic hypoxic respiratory failure  Stage 4 metastatic non small cell lung cancer  COPD  Interstitial lung disease  Chronic diastolic CHF  CKD3  Hyperkalemia  Multifactorial anemia  Anxiety  Hemorrhoids  Constipation    Continue current treatment. Monitor counts. Increase activity. Labs Monday. Continue oxygen weaning as tolerated under direction of pulmonology. Aggressive therapies as tolerated. Maintain patient safety. Adjust bowel regimen as needed.       Electronically signed by MARITZA Remy on 10/26/2024 at 07:32 CDT

## 2024-10-26 NOTE — PROGRESS NOTES
[facility] PULMONARY AND CRITICAL CARE PROGRESS NOTE - [facility]    Patient: Karoline Prater    1940   Acct# [id number]  10/26/24   09:19 CDT  Referring Provider: Edmond Izquierdo MD  Chief Complaint: Acute on chronic respiratory failure  Interval history: He is seen awake and alert resting in bed.  Oxygen saturation stable on 3 L nasal cannula.  He continues to get short of breath with minimal exertion.  He is working with therapy.  He tells me he has been setting up in the chair every day.  No new labs to review.  Afebrile.  Physical Exam:  Vital signs: T: 98.1   BP: 127/71   P: 70   R: 16   sat: 97%  Physical Exam  Constitutional:       General: He is not in acute distress.     Interventions: Nasal cannula in place.      Comments: 3l   HENT:      Head: Normocephalic.      Nose: Nose normal.      Mouth/Throat:      Mouth: Mucous membranes are moist.   Eyes:      General: No scleral icterus.  Cardiovascular:      Rate and Rhythm: Normal rate.   Pulmonary:      Effort: No respiratory distress.      Breath sounds: Decreased breath sounds present.   Abdominal:      General: There is no distension.   Skin:     Findings: Bruising present.   Neurological:      Mental Status: He is alert. Mental status is at baseline.   Psychiatric:         Mood and Affect: Mood normal.         Behavior: Behavior is cooperative.         Electronically signed by MARITZA Walters, 10/26/2024, 09:19 CDT     Physician substantive portion: medical decision making  Result Review  Laboratory Data:  Results from last 7 days   Lab Units 10/24/24  0355 10/21/24  0448   WBC 10*3/mm3 8.32 9.35   HEMOGLOBIN g/dL 11.0* 11.3*   PLATELETS 10*3/mm3 92* 118*     Results from last 7 days   Lab Units 10/24/24  0355 10/21/24  0448   SODIUM mmol/L 141 143   POTASSIUM mmol/L 4.5 5.1   CO2 mmol/L 25.0 30.0*   BUN mg/dL 30* 27*   CREATININE mg/dL 1.67* 1.79*         Microbiology Results (last 10 days)       ** No results found  for the last 240 hours. **           Recent films:  No radiology results from the last 24 hrs   Personal review of imaging : CXR shows reviewed last imaging study which appears to be stable with chronic lung changes no new x-rays done today      Pulmonary Assessment:  Acute on chronic hypoxic respiratory failure  Dependence on supplemental oxygen  Bilateral pulm infiltrate/post-COVID syndrome  Secondary bacterial pneumonia  History of interstitial lung disease  Chronic congestive diastolic heart failure with preserved EF.  Chronic obstructive pulmonary disease.  No carcinoma the right upper lobe of the lung with metastasis  Status post chemotherapy and radiation treatment  Allergic rhinitis    Recommend/plan:   Patient was seen in the follow-up visit in LTAC unit today.  He is stable sitting up in the chair and currently on 3 L of oxygen which is his home baseline  He is doing better with the oxygen to nasal cannula and sometimes uses a small mask  Nasal dryness is better but he still having some occasional bleeding.  No epistaxis reported today  He is using saline spray and gel multiple times a day .  Use humidified oxygen all the time.  Patient has history of chronic lung disease with fibrosis and COPD and also had COVID in the past.  He requires little more oxygen during physical therapy but overall feeling better.  Renal function stable and improving  Continue physical activity incentive spirometry, bronchodilator treatment and routine respiratory care  Oncology following for history of lung cancer.  No immediate treatment plan.  Will need outpatient follow-up  DVT and stress ulcer prophylaxis and pain and anxiety control.  Anticipated discharge next week.  Nutritional support.  Plan for outpatient follow-up in the pulmonary clinic with me in a month after discharge  CODE STATUS: Limited code.  Overall prognosis: Guarded.  We will follow.    Time spent by me: 35 min    This visit was performed by both tessie  physician and an [facility] RN.  I performed all aspects of the medical decision making as documented.    Electronically signed by     Tarik Crocker MD,  Pulmonologist/Intensivist   10/26/2024, 18:11 CDT

## 2024-10-27 ENCOUNTER — APPOINTMENT (OUTPATIENT)
Dept: GENERAL RADIOLOGY | Facility: HOSPITAL | Age: 84
End: 2024-10-27
Payer: COMMERCIAL

## 2024-10-27 PROCEDURE — 63710000001 PREDNISONE PER 5 MG: Performed by: INTERNAL MEDICINE

## 2024-10-27 PROCEDURE — 99233 SBSQ HOSP IP/OBS HIGH 50: CPT | Performed by: INTERNAL MEDICINE

## 2024-10-27 PROCEDURE — 71045 X-RAY EXAM CHEST 1 VIEW: CPT

## 2024-10-27 RX ORDER — NYSTATIN 100000 [USP'U]/G
POWDER TOPICAL 3 TIMES DAILY
Status: DISCONTINUED | OUTPATIENT
Start: 2024-10-27 | End: 2024-11-01 | Stop reason: HOSPADM

## 2024-10-27 NOTE — PROGRESS NOTES
"[facility]  ASHLEIGH Izquierdo M.D.  MARITZA Frias        Internal Medicine Progress Note    10/27/2024   09:15 CDT    Name:  Karoline Prater  MRN:    [id number]     Acct:     [id number]   Room:  1/[facility] Day: 0     Admit Date: 10/7/2024  4:53 PM  PCP: Irving Alexis MD    Subjective:     C/C: weakness, shortness of breath    Interval History: Status:  improved. No family at bedside. Pt currently getting up to chair. He complains of shortness of breath today since he woke up. RN at bedside states  he has just used bedside commode and then to chair. Current sats are in mid 80's.  Oxygen increased for recovery.  Charge nurse states miconazole cream did not come in from pharmacy \"waiting on shipment should be here today\" informed patient.      Review of Systems   Constitutional: Positive for malaise/fatigue. Negative for chills, decreased appetite, weight gain and weight loss.   HENT:  Negative for congestion, ear discharge, hoarse voice and tinnitus.    Eyes:  Negative for blurred vision, discharge, visual disturbance and visual halos.   Cardiovascular:  Positive for dyspnea on exertion. Negative for chest pain, claudication, irregular heartbeat, leg swelling, orthopnea and paroxysmal nocturnal dyspnea.   Respiratory:  Positive for shortness of breath. Negative for cough, sputum production and wheezing.    Endocrine: Negative for cold intolerance, heat intolerance and polyuria.   Hematologic/Lymphatic: Negative for adenopathy. Does not bruise/bleed easily.   Skin:  Negative for dry skin, itching and suspicious lesions.   Musculoskeletal:  Negative for arthritis, back pain, falls, joint pain, muscle weakness and myalgias.   Gastrointestinal:  Negative for abdominal pain, constipation, diarrhea, dysphagia and hematemesis.   Genitourinary:  Negative for bladder incontinence, dysuria and frequency.   Neurological:  Positive for weakness. Negative for aphonia, disturbances in coordination and " dizziness.   Psychiatric/Behavioral:  Negative for altered mental status, depression, memory loss and substance abuse. The patient does not have insomnia and is not nervous/anxious.          Medications:     Allergies:   Allergies   Allergen Reactions    Penicillins Hives       Current Meds:   Current Facility-Administered Medications:     acetaminophen (TYLENOL) tablet 1,000 mg, 1,000 mg, Oral, Nightly, Edmond Izquierdo MD    aspirin EC tablet 81 mg, 81 mg, Oral, Daily, Edmond Izquierdo MD    atorvastatin (LIPITOR) tablet 20 mg, 20 mg, Oral, Nightly, Edmond Izquierdo MD    bisacodyl (DULCOLAX) EC tablet 5 mg, 5 mg, Oral, Daily, Sil Jensen APRN    bisacodyl (DULCOLAX) suppository 10 mg, 10 mg, Rectal, Daily PRN, Edmond Izquierdo MD    budesonide (PULMICORT) nebulizer solution 0.5 mg, 0.5 mg, Nebulization, BID - RT, Tarik Crocker MD    busPIRone (BUSPAR) tablet 5 mg, 5 mg, Oral, TID With Meals, Edmond Izquierdo MD    diphenhydrAMINE (BENADRYL) capsule 25 mg, 25 mg, Oral, Nightly PRN, Edmond Izquierdo MD    empagliflozin (JARDIANCE) tablet 10 mg, 10 mg, Oral, Daily, Edmond Izquierdo MD    guaiFENesin (MUCINEX) 12 hr tablet 600 mg, 600 mg, Oral, Q12H, Edmond Izquierdo MD    HYDROcodone-acetaminophen (NORCO) 5-325 MG per tablet 1 tablet, 1 tablet, Oral, TID, Edmond Izquierdo MD    hydrocortisone (ANUSOL-HC) suppository 25 mg, 25 mg, Rectal, Q12H PRN, Edmond Izquierdo MD    ipratropium-albuterol (DUO-NEB) nebulizer solution 3 mL, 3 mL, Nebulization, 4x Daily - RT, Edmond Izquierdo MD    isosorbide mononitrate (IMDUR) 24 hr tablet 30 mg, 30 mg, Oral, Daily Before Lunch, Edmond Izquierdo MD    melatonin tablet 10 mg, 10 mg, Oral, Nightly, Edmond Izquierdo MD    metoprolol succinate XL (TOPROL-XL) 24 hr tablet 25 mg, 25 mg, Oral, Daily, Edmond Izquierdo MD    miconazole (MICOTIN) 2 % powder 1 Application, 1 Application,  "Topical, Q12H, Edmond Izquierdo MD    nitroglycerin (NITROSTAT) SL tablet 0.4 mg, 0.4 mg, Sublingual, Q5 Min PRN, Edmond Izquierdo MD    ondansetron ODT (ZOFRAN-ODT) disintegrating tablet 4 mg, 4 mg, Oral, Q6H PRN **OR** ondansetron (ZOFRAN) injection 4 mg, 4 mg, Intravenous, Q6H PRN, Edmond Izquierdo MD    pantoprazole (PROTONIX) EC tablet 40 mg, 40 mg, Oral, Daily, Edmond Izquierdo MD    polyethylene glycol (MIRALAX) packet 17 g, 17 g, Oral, Daily PRN, Edmond Izquierdo MD    predniSONE (DELTASONE) tablet 10 mg, 10 mg, Oral, Daily, Gareth Becerra MD    sodium bicarbonate tablet 650 mg, 650 mg, Oral, TID, Edmond Izquierdo MD    sodium chloride 0.9 % bolus 250 mL, 250 mL, Intravenous, PRN, Edmond Izquierdo MD    sodium chloride nasal spray 2 spray, 2 spray, Each Nare, 5x Daily, Edmond Izquierdo MD    sodium chloride nasal spray 2 spray, 2 spray, Each Nare, Continuous PRN, Edmond Izquierdo MD    tamsulosin (FLOMAX) 24 hr capsule 0.4 mg, 0.4 mg, Oral, Nightly, Edmond Izquierdo MD    traZODone (DESYREL) tablet 25 mg, 25 mg, Oral, Nightly PRN, Edmond Izquierdo MD    Data:     Code Status:    There are no questions and answers to display.       Family History   Problem Relation Age of Onset    Hypertension Mother     Leukemia Father        Social History     Socioeconomic History    Marital status: Single   Tobacco Use    Smoking status: Former     Current packs/day: 0.00     Types: Cigarettes     Start date: 10/29/1954     Quit date: 10/29/2003     Years since quittin.0    Smokeless tobacco: Former     Types: Chew     Quit date:    Vaping Use    Vaping status: Never Used   Substance and Sexual Activity    Alcohol use: No    Drug use: No    Sexual activity: Defer       Vitals:  Ht 162.6 cm (64\")   Wt 74 kg (163 lb 1.6 oz)   BMI 28.00 kg/m²   T 97.7 P 72 R 23  /75 Sp02 98% (6 lpm)          I/O (24Hr):  No intake or output data in the " 24 hours ending 10/27/24 0915    Labs and imaging:      No results found for this or any previous visit (from the past 12 hours).                                      Physical Examination:        Physical Exam  Vitals and nursing note reviewed.   Constitutional:       Appearance: He is well-developed.   HENT:      Head: Normocephalic and atraumatic.      Nose: Nose normal.      Mouth/Throat:      Mouth: Mucous membranes are moist.      Pharynx: Oropharynx is clear.   Eyes:      Pupils: Pupils are equal, round, and reactive to light.      Comments: Left eye enucleated   Cardiovascular:      Rate and Rhythm: Normal rate and regular rhythm.      Heart sounds: Normal heart sounds.   Pulmonary:      Effort: Pulmonary effort is normal.      Breath sounds: Normal breath sounds.   Abdominal:      General: Bowel sounds are normal.      Palpations: Abdomen is soft.   Musculoskeletal:         General: Normal range of motion.      Cervical back: Normal range of motion and neck supple.      Comments: Generalized weakness   Skin:     General: Skin is warm and dry.   Neurological:      Mental Status: He is alert and oriented to person, place, and time.      Deep Tendon Reflexes: Reflexes are normal and symmetric.   Psychiatric:         Behavior: Behavior normal.           Assessment:             Stage 3b chronic kidney disease    A-fib    Former smoker    COPD (chronic obstructive pulmonary disease)    History of radiation therapy    Chronic heart failure with preserved ejection fraction (HFpEF)    Atrial flutter    COVID-19 virus infection    Acute-on-chronic respiratory failure    Epistaxis    Chronic rhinitis    History of pneumonia    Supplemental oxygen dependent    Past Medical History:   Diagnosis Date    Arthritis     Atrial fibrillation with rapid ventricular response 05/31/2019    Blindness of left eye     BPH with obstruction/lower urinary tract symptoms     Cancer     stomach & lung    CHF (congestive heart failure)      CKD (chronic kidney disease) stage 3, GFR 30-59 ml/min     COPD with acute exacerbation 08/03/2024    Coronary artery disease     Elevated cholesterol     Essential hypertension 10/02/2017    Fibrotic lung diseases     GERD (gastroesophageal reflux disease)     Hearing loss     History of transfusion     Hydronephrosis of left kidney     Hyperlipidemia     Hypertension     pt was taken off of all of his medications for BP (atenolol, lisinopril, lasix) because his BP kept bottoming out so his primary dr told him to discontinue them 1-2 months ago (Jan/Feb 2019). pt states he takes no medications currently.    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 Patel is just going to keep an eye on it and not do surgery right now.    Mediastinal adenopathy 10/24/2018    Station [facility]    Monoclonal gammopathy of unknown significance (MGUS) 09/11/2018    Pancreatic mass     pt states he had this in 2013 but it went away on its own. Now recent CT shows it has come back so he is going to have an ultrasound on 3/13/19.    Shortness of breath         Plan:        Acute on chronic hypoxic respiratory failure  Stage 4 metastatic non small cell lung cancer  COPD  Interstitial lung disease  Chronic diastolic CHF  CKD3  Hyperkalemia  Multifactorial anemia  Anxiety  Hemorrhoids  Constipation    Continue current treatment. Monitor counts. Increase activity. Labs Monday. Continue oxygen weaning as tolerated under direction of pulmonology. Aggressive therapies as tolerated. Maintain patient safety. Adjust bowel regimen as needed.       Electronically signed by MARITZA Remy on 10/27/2024 at 09:15 CDT     I have discussed the care of Karoline Prater, including pertinent history and exam findings, with the nurse practitioner.    I have seen and examined the patient and the key  elements of all parts of the encounter have been performed by me.  I agree with the assessment, plan and orders as documented by MARITZA Beltran, after I modified the exam findings and the plan of treatments and the final version is my approved version of the assessment.        Electronically signed by Edmond Izquierdo MD on 10/27/2024 at 20:16 CDT

## 2024-10-27 NOTE — PROGRESS NOTES
[facility] PULMONARY AND CRITICAL CARE PROGRESS NOTE - [facility]    [facility]: Karoline Prater    1940   [id number]  10/27/24   09:20 CDT  Referring Provider: Edmond Izquierdo MD  Chief Complaint: Acute on chronic respiratory failure  Interval history: He was seen in the follow-up visit in LTAC unit today.  He is doing well and currently on third dose of oxygen but still complaining of nasal dryness including gels in the sprays all the time.  He did work with the physical therapy he has back pain and the shoulder pain has improved.  He had a chest x-ray done yesterday which was unremarkable chest.  He is most likely plan for discharge home this week.  No other acute events overnight and he is afebrile.    Physical Exam:  Vital signs: T: 97.7   BP: 137/75   P: 72   R: 23   sat: 98%  Physical Exam  Constitutional:       General: He is not in acute distress.     Interventions: Nasal cannula in place.      Comments: 3l   HENT:      Head: Normocephalic.      Nose: Nose normal.      Mouth/Throat:      Mouth: Mucous membranes are moist.   Eyes:      General: No scleral icterus.  Cardiovascular:      Rate and Rhythm: Normal rate.   Pulmonary:      Effort: No respiratory distress.      Breath sounds: Decreased breath sounds present.   Abdominal:      General: There is no distension.   Skin:     Findings: Bruising present.   Neurological:      Mental Status: He is alert. Mental status is at baseline.   Psychiatric:         Mood and Affect: Mood normal.         Behavior: Behavior is cooperative.         Electronically signed by MARITZA Walters, 10/27/2024, 09:20 CDT     Physician substantive portion: medical decision making  Result Review  Laboratory Data:  Results from last 7 days   Lab Units 10/24/24  0355 10/21/24  0448   WBC 10*3/mm3 8.32 9.35   HEMOGLOBIN g/dL 11.0* 11.3*   PLATELETS 10*3/mm3 92* 118*     Results from last 7 days   Lab Units 10/24/24  0355 10/21/24  0448   SODIUM  mmol/L 141 143   POTASSIUM mmol/L 4.5 5.1   CO2 mmol/L 25.0 30.0*   BUN mg/dL 30* 27*   CREATININE mg/dL 1.67* 1.79*         Microbiology Results (last 10 days)       ** No results found for the last 240 hours. **           Recent films:  XR Chest 1 View    Result Date: 10/27/2024  EXAMINATION: XR CHEST 1 VW-  10/27/2024 9:41 AM  HISTORY: RIGHT side chest pain.  1 view chest x-ray.  COMPARISON: 10/8/2024 and older chest x-rays dating back to 10/23/2018.  RIGHT upper lung focal opacity again seen.  Interstitial fibrosis.  No pneumothorax.  Normal heart size.  Unchanged RIGHT chest wall port.      Impression: 1. Unchanged appearance of severe chronic lung changes and 3-4 cm RIGHT upper lobe opacity.    This report was signed and finalized on 10/27/2024 10:54 AM by Dr. Danilo Sebastian MD.      Personal review of imaging : CXR shows last chest x-ray done today shows severe chronic changes in the lung right upper lobe lung nodule or mass.      Pulmonary Assessment:  Acute on chronic hypoxic respiratory failure  Dependence on supplemental oxygen  Bilateral pulm infiltrate/post-COVID syndrome  Secondary bacterial pneumonia  History of interstitial lung disease  Chronic congestive diastolic heart failure with preserved EF.  Chronic obstructive pulmonary disease.  No carcinoma the right upper lobe of the lung with metastasis  Status post chemotherapy and radiation treatment  Allergic rhinitis    Recommend/plan:   Patient was seen in the follow-up visit in LTAC unit today.  He was sitting up in the chair doing well from pulmonary standpoint  He remains on 3 L oxygen which is his baseline.  His back pain is better.  He had a bowel movement and feeling better.  He still has some complaints about the nasal dryness but did not have any epistaxis  Continue using saline gel and saline drops.  He had no further epistaxis.  Anticoagulation may be resumed  He is using saline spray and gel multiple times a day .  Use humidified oxygen  all the time.  Patient has history of chronic lung disease with fibrosis and COPD and also had COVID in the past.  He requires little more oxygen during physical therapy but overall feeling better.  Renal function stable and improving  Continue physical activity incentive spirometry, bronchodilator treatment and routine respiratory care  Oncology following for history of lung cancer.  No immediate treatment plan.  Will need outpatient follow-up  DVT and stress ulcer prophylaxis and pain and anxiety control.  Anticipated discharge in next few days.  Nutritional support.  Plan for outpatient follow-up in the pulmonary clinic with me in a month after discharge  CODE STATUS: Limited code.  Overall prognosis: Guarded.  We will follow.    Time spent by me: 35 min    This visit was performed by both a physician and an [facility] RN.  I performed all aspects of the medical decision making as documented.    Electronically signed by     Tarik Crocker MD,  Pulmonologist/Intensivist   10/27/2024, 14:46 CDT

## 2024-10-27 NOTE — PROGRESS NOTES
Progress Note    Patient name: Karoline Prater  Patient : 1940  VISIT # [id number]  MR #[id number]  Room:     Subjective He feels a little bit more short of breath this morning.  Patient had a chest x-ray performed.  He is afebrile.      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   CKD and chemotherapy associated ANEMIA, previously on Procrit, currently on hold having completed chemotherapy with further improvement in hemoglobin.  Pancreatic mass diagnosed 10/29/03 negative on open biopsy  BPH on Flomax  Orthostatic hypotension medications managed by Dr. Reuben Vásquez completed 4 cycles of combination chemotherapy with carboplatin, Keytruda, Alimta on 2019.    Maintenance Keytruda and Alimta every 3 weeks was delivered.    The final cycle number #25 of Keytruda/Alimta was delivered on 10/29/2020.     Treatment was held since that time due to issues of dyspnea and shortness of breath, requiring steroids.      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      Mr. Prater has had several hospitalizations and exacerbations of COPD and complications with pulmonary fibrosis in addition to the question of possible progressive disease including possible lymphangitic spread.       He is currently, 10/17/2024, on LTAC  I have been asked to see him for comment and opinion regarding recent imaging findings.  Below are findings and interactions I have had and monitoring Mr. Prater's history of lung cancer.     I discussed imaging with Dr Cool on 7/3/2024 regarding the 2024 imaging.  Dr. Cool was gracious enough to review imaging  "studies with the following assessment by Dr. Danny Cool:  \"I have reviewed the CT scan of the thorax on this admission for atrial fibrillation with rapid ventricular response.     He completed recent SBRT radiotherapy for 2 right lung nodules on February 7, 2024.     Review of the current CT scan appears consistent with postradiation change.  We cannot entirely rule out progression however this appears unlikely with the chronology and radiographic appearance.     He is scheduled to return to our department in approximately 1 month with follow-up CT scan of the thorax and we will follow this closely with serial CT scans of the thorax.\"           CT scan of the chest without contrast on 9/21/2024 with addendum report is as follows:  Spiculated masslike opacity of the right upper lobe, similar to only minimally decreased compared to 8/3/2024, increased compared to 6/29/2024. Neoplastic process must be considered.   Stable scarring suspected of the right lung apex. Nonspecific irregular 2 x 1 cm nodular focus right middle lobe for which continued imaging surveillance recommended.   Advanced chronic interstitial lung disease with bronchiectasis.   No pneumothorax. Diffusely irregular right pleural thickening remains stable.     This report was signed and finalized on 9/21/2024 1:13 PM by Dr. Betsey Wells MD.     ADDENDUM: Request from Dr. Michele's office for additional comparison  with the earlier imaging examinations, comparison is made with PET/CT  8/20/2024, CT chest without contrast 8/3/2024, CT chest without contrast  6/29/2024, CT chest without contrast 5/6/2024.     Measurements of the spiculated mass in the right upper lobe:     CT chest without contrast 5/6/2024: Approximately 4.0 x 2.2 cm     CT chest without contrast 6/29/202, Approximately 4.7 x 2.1 cm     CT chest without contrast 8/3/2024: Approximately 4.9 x 4.2 cm     PET/CT 8/20/2024: Approximately 3.6 x 1.8 cm.     CT chest without contrast " 9/21/2024: Approximately 4.2 x 3.6 cm.     The mass was slowly increasing in size from May, 2024 through August 3,  2024, then it was smaller on the PET/CT from 8/20/2024, which could be a  positive treatment response. The mass then increased in size on  9/21/2024 and that measurement includes more confluent soft tissue  attenuation against the lateral pleural surface, which could be due to  increased volume of tumor tissue, or confluent surrounding infiltrate or  progressive fibrosis. A repeat PET/CT could help determine if the  increased size and volume of the mass is related to avid tumor or due to  an increase in surrounding infiltrate and fibrosis.     This report was signed and finalized on 10/7/2024 3:44 PM by Dr. Allen Churchill MD.             CTA chest at [facility] on 10/3/2024 reported the following:  Similar masslike spiculated opacity at the RIGHT upper lobe. Similar interlobular septal thickening with increased patchy opacities especially in the RIGHT lung. Appearance highly concerning for malignancy with lymphangitic spread of tumor.   Similar mild adenopathy.  Coronary artery calcifications with metallic device in the RIGHT  ventricular apex, favor wireless pulse generator.  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             I agree with the radiologist's interpretation that the actual tumor mass within the radiation therapy field is similar to previous imaging.  I also agree that there are parenchymal interstitial changes that come and go, sometimes are worse sometimes are diminished with treatment.     In discussion with Dr. Gordon, Mr. Prater appears to be improving since on LTAC anticipating likely improvement in the imaging of the interstitial lung disease problems.      REVIEW OF SYSTEMS:   History obtained from chart review and the patient  General: positive for  - fatigue   ENT: negative for - oral lesions  Allergy and Immunology: negative   Hematological and  Lymphatic:negative for - weight loss  Respiratory:  Baseline dyspnea at rest, with nasal cannula present but decreased compared to admission to LTAC.  Some dyspnea on exertion but able to walk across the room with assistance and oxygen supplementation   Cardiovascular: negative for - chest pain or palpitations  Gastrointestinal: positive for - appetite loss and diarrhea  Genito-Urinary: negative for - dysuria or hematuria  Musculoskeletal: positive for - generalized weakness  Neurological: negative for - confusion, dizziness or headaches  Dermatological: negative for - pruritus and rash    Objective     Vital Signs     No intake or output data in the 24 hours ending 10/27/24 0959    PHYSICAL EXAM:  General Appearance: Alert, cooperative, in no acute distress  Head: Normocephalic, without obvious abnormality, atraumatic  Eyes: Normal conjunctivae and sclerae, anicteric  Ears: Ears appear intact with no abnormalities noted, hearing grossly normal  Throat: No oral lesions, no thrush, oral mucosa moist  Neck: No adenopathy, supple, trachea midline, no JVD  Lungs: Scattered diffuse rales at bases, but without wheezing and with fairly clear breath sounds anteriorly   Heart: Regular rhythm and normal rate, no murmurs, gallops or rubs  Abdomen: Normal bowel sounds, no masses, no organomegaly, soft non-tender, non-distended  Genitalia: Deferred  Extremities: Moves all extremities equally well, no edema, no cyanosis, no redness  Skin: No bleeding, bruising or rash  Lymph nodes: No palpable adenopathy  Neurologic: Grossly intact though not formally tested        CBC  Results from last 7 days   Lab Units 10/24/24  0355 10/21/24  0448   WBC 10*3/mm3 8.32 9.35   HEMOGLOBIN g/dL 11.0* 11.3*   HEMATOCRIT % 37.8 39.6   PLATELETS 10*3/mm3 92* 118*       CMP  Lab Results   Component Value Date    GLUCOSE 110 (H) 10/24/2024    BUN 30 (H) 10/24/2024    CREATININE 1.67 (H) 10/24/2024    EGFRIFNONA 29 (L) 08/27/2021    BCR 18.0 10/24/2024  "   CO2 25.0 10/24/2024    CALCIUM 9.9 10/24/2024    ALBUMIN 2.7 (L) 10/09/2024    AST 18 10/09/2024    ALT 24 10/09/2024             No results found for: \"BLOODCX\", \"URINECX\", \"WOUNDCX\", \"MRSACX\", \"RESPCX\", \"STOOLCX\"    Imaging Results (Last 72 Hours)       ** No results found for the last 72 hours. 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   CKD and chemotherapy associated ANEMIA, previously on Procrit, currently on hold having completed chemotherapy with further improvement in hemoglobin.  Pancreatic mass diagnosed 10/29/03 negative on open biopsy  BPH on Flomax  Orthostatic hypotension medications managed by Dr. Reuben Vásquez completed 4 cycles of combination chemotherapy with carboplatin, Keytruda, Alimta on 7/5/2019.    Maintenance Keytruda and Alimta every 3 weeks was delivered.    The final cycle number #25 of Keytruda/Alimta was delivered on 10/29/2020.     Treatment was held since that time due to issues of dyspnea and shortness of breath, requiring steroids.      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      Mr. Prater has had several hospitalizations and exacerbations of COPD and complications with pulmonary fibrosis " "in addition to the question of possible progressive disease including possible lymphangitic spread.       He is currently, 10/17/2024, on LTAC  I have been asked to see him for comment and opinion regarding recent imaging findings.  Below are findings and interactions I have had and monitoring Mr. Prater's history of lung cancer.     I discussed imaging with Dr Cool on 7/3/2024 regarding the June 2024 imaging.  Dr. Cool was gracious enough to review imaging studies with the following assessment by Dr. Danny Cool:  \"I have reviewed the CT scan of the thorax on this admission for atrial fibrillation with rapid ventricular response.     He completed recent SBRT radiotherapy for 2 right lung nodules on February 7, 2024.     Review of the current CT scan appears consistent with postradiation change.  We cannot entirely rule out progression however this appears unlikely with the chronology and radiographic appearance.     He is scheduled to return to our department in approximately 1 month with follow-up CT scan of the thorax and we will follow this closely with serial CT scans of the thorax.\"           CT scan of the chest without contrast on 9/21/2024 with addendum report is as follows:  Spiculated masslike opacity of the right upper lobe, similar to only minimally decreased compared to 8/3/2024, increased compared to 6/29/2024. Neoplastic process must be considered.   Stable scarring suspected of the right lung apex. Nonspecific irregular 2 x 1 cm nodular focus right middle lobe for which continued imaging surveillance recommended.   Advanced chronic interstitial lung disease with bronchiectasis.   No pneumothorax. Diffusely irregular right pleural thickening remains stable.     This report was signed and finalized on 9/21/2024 1:13 PM by Dr. Betsey Wells MD.     ADDENDUM: Request from Dr. Michele's office for additional comparison  with the earlier imaging examinations, comparison is made with " PET/CT  8/20/2024, CT chest without contrast 8/3/2024, CT chest without contrast  6/29/2024, CT chest without contrast 5/6/2024.     Measurements of the spiculated mass in the right upper lobe:     CT chest without contrast 5/6/2024: Approximately 4.0 x 2.2 cm     CT chest without contrast 6/29/202, Approximately 4.7 x 2.1 cm     CT chest without contrast 8/3/2024: Approximately 4.9 x 4.2 cm     PET/CT 8/20/2024: Approximately 3.6 x 1.8 cm.     CT chest without contrast 9/21/2024: Approximately 4.2 x 3.6 cm.     The mass was slowly increasing in size from May, 2024 through August 3,  2024, then it was smaller on the PET/CT from 8/20/2024, which could be a  positive treatment response. The mass then increased in size on  9/21/2024 and that measurement includes more confluent soft tissue  attenuation against the lateral pleural surface, which could be due to  increased volume of tumor tissue, or confluent surrounding infiltrate or  progressive fibrosis. A repeat PET/CT could help determine if the  increased size and volume of the mass is related to avid tumor or due to  an increase in surrounding infiltrate and fibrosis.     This report was signed and finalized on 10/7/2024 3:44 PM by Dr. Allen Churchill MD.             [facility] on 10/3/2024 reported the following:  Similar masslike spiculated opacity at the RIGHT upper lobe. Similar interlobular septal thickening with increased patchy opacities especially in the RIGHT lung. Appearance highly concerning for malignancy with lymphangitic spread of tumor.   Similar mild adenopathy.  Coronary artery calcifications with metallic device in the RIGHT  ventricular apex, favor wireless pulse generator.  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             I agree with the radiologist's interpretation that the actual tumor mass within the radiation therapy field is similar to previous imaging.  I also agree that there are parenchymal  interstitial changes that come and go, sometimes are worse sometimes are diminished with treatment.     In discussion with Dr. Gordon, Mr. Prater appears to be improving since on LTAC anticipating likely improvement in the imaging of the interstitial lung disease problems.    Dyspnea- stable CXR      RECOMMENDATIONS:  Repeat CT scan of the chest in 2 to 3 weeks for comparison.  May even consider PET scan if CT scan looks better.  Hopefully we will be able to continue on a chemotherapy holiday without systemic intervention.  Mr. Prater has not been in favor of further systemic therapy over the last couple years and indeed chances of complications would increase given his marginal pulmonary reserve.  So far I do not see obvious evidence of progression.     Mr. Prater was encouraged with this assessment.    Mr. Prater is looking forward to advancing in activity and exercise with physical therapy this week      Chon Rico MD  10/27/24  09:59 CDT

## 2024-10-28 VITALS — BODY MASS INDEX: 26.89 KG/M2 | WEIGHT: 157.5 LBS | HEIGHT: 64 IN

## 2024-10-28 LAB
ANION GAP SERPL CALCULATED.3IONS-SCNC: 5 MMOL/L (ref 5–15)
BASOPHILS # BLD AUTO: 0.02 10*3/MM3 (ref 0–0.2)
BASOPHILS NFR BLD AUTO: 0.3 % (ref 0–1.5)
BUN SERPL-MCNC: 25 MG/DL (ref 8–23)
BUN/CREAT SERPL: 15.1 (ref 7–25)
CALCIUM SPEC-SCNC: 9.8 MG/DL (ref 8.6–10.5)
CHLORIDE SERPL-SCNC: 105 MMOL/L (ref 98–107)
CO2 SERPL-SCNC: 30 MMOL/L (ref 22–29)
CREAT SERPL-MCNC: 1.66 MG/DL (ref 0.76–1.27)
DEPRECATED RDW RBC AUTO: 68.5 FL (ref 37–54)
EGFRCR SERPLBLD CKD-EPI 2021: 40.9 ML/MIN/1.73
EOSINOPHIL # BLD AUTO: 0.48 10*3/MM3 (ref 0–0.4)
EOSINOPHIL NFR BLD AUTO: 6 % (ref 0.3–6.2)
ERYTHROCYTE [DISTWIDTH] IN BLOOD BY AUTOMATED COUNT: 19.1 % (ref 12.3–15.4)
GLUCOSE SERPL-MCNC: 102 MG/DL (ref 65–99)
HCT VFR BLD AUTO: 40.4 % (ref 37.5–51)
HGB BLD-MCNC: 11.6 G/DL (ref 13–17.7)
IMM GRANULOCYTES # BLD AUTO: 0.06 10*3/MM3 (ref 0–0.05)
IMM GRANULOCYTES NFR BLD AUTO: 0.8 % (ref 0–0.5)
LYMPHOCYTES # BLD AUTO: 0.64 10*3/MM3 (ref 0.7–3.1)
LYMPHOCYTES NFR BLD AUTO: 8 % (ref 19.6–45.3)
MCH RBC QN AUTO: 27.9 PG (ref 26.6–33)
MCHC RBC AUTO-ENTMCNC: 28.7 G/DL (ref 31.5–35.7)
MCV RBC AUTO: 97.1 FL (ref 79–97)
MONOCYTES # BLD AUTO: 0.48 10*3/MM3 (ref 0.1–0.9)
MONOCYTES NFR BLD AUTO: 6 % (ref 5–12)
NEUTROPHILS NFR BLD AUTO: 6.32 10*3/MM3 (ref 1.7–7)
NEUTROPHILS NFR BLD AUTO: 78.9 % (ref 42.7–76)
NRBC BLD AUTO-RTO: 0 /100 WBC (ref 0–0.2)
PLATELET # BLD AUTO: 102 10*3/MM3 (ref 140–450)
PMV BLD AUTO: 12.5 FL (ref 6–12)
POTASSIUM SERPL-SCNC: 4.4 MMOL/L (ref 3.5–5.2)
RBC # BLD AUTO: 4.16 10*6/MM3 (ref 4.14–5.8)
SODIUM SERPL-SCNC: 140 MMOL/L (ref 136–145)
WBC NRBC COR # BLD AUTO: 8 10*3/MM3 (ref 3.4–10.8)

## 2024-10-28 PROCEDURE — 63710000001 PREDNISONE PER 5 MG: Performed by: INTERNAL MEDICINE

## 2024-10-28 PROCEDURE — 93005 ELECTROCARDIOGRAM TRACING: CPT | Performed by: INTERNAL MEDICINE

## 2024-10-28 PROCEDURE — 80048 BASIC METABOLIC PNL TOTAL CA: CPT | Performed by: INTERNAL MEDICINE

## 2024-10-28 PROCEDURE — 85025 COMPLETE CBC W/AUTO DIFF WBC: CPT | Performed by: INTERNAL MEDICINE

## 2024-10-28 PROCEDURE — 97530 THERAPEUTIC ACTIVITIES: CPT

## 2024-10-28 PROCEDURE — 97116 GAIT TRAINING THERAPY: CPT

## 2024-10-28 RX ORDER — HYDROCODONE BITARTRATE AND ACETAMINOPHEN 5; 325 MG/1; MG/1
1 TABLET ORAL 3 TIMES DAILY
Status: DISCONTINUED | OUTPATIENT
Start: 2024-10-28 | End: 2024-11-01 | Stop reason: HOSPADM

## 2024-10-28 NOTE — PROGRESS NOTES
[facility] PULMONARY AND CRITICAL CARE PROGRESS NOTE - [facility]    Patient: Karoline Prater    1940   Acct# [id number]  10/28/24   15:28 CDT  Referring Provider: Edmond Izquierdo MD  Chief Complaint: Acute on chronic respiratory failure    Interval history: He was seen in the follow-up visit in LTAC unit today.  He is doing better and currently on oxygen 3 L.  Still has nasal dryness.  He has some anxiety issues but otherwise stable.  Working with physical therapy.  He did have a bowel movement and feeling little better.  Plan for discharge home this week  Physical Exam:  Vital signs: T: 97.7   BP: 137/75   P: 72   R: 23   sat: 98%  Physical Exam  Constitutional:       General: He is not in acute distress.     Interventions: Nasal cannula in place.      Comments: 3l   HENT:      Head: Normocephalic.      Nose: Nose normal.      Mouth/Throat:      Mouth: Mucous membranes are moist.   Eyes:      General: No scleral icterus.  Cardiovascular:      Rate and Rhythm: Normal rate.   Pulmonary:      Effort: No respiratory distress.      Breath sounds: Decreased breath sounds present.   Abdominal:      General: There is no distension.   Skin:     Findings: Bruising present.   Neurological:      Mental Status: He is alert. Mental status is at baseline.   Psychiatric:         Mood and Affect: Mood normal.         Behavior: Behavior is cooperative.       Laboratory Data:  Results from last 7 days   Lab Units 10/28/24  0506 10/24/24  0355   WBC 10*3/mm3 8.00 8.32   HEMOGLOBIN g/dL 11.6* 11.0*   PLATELETS 10*3/mm3 102* 92*     Results from last 7 days   Lab Units 10/28/24  0506 10/24/24  0355   SODIUM mmol/L 140 141   POTASSIUM mmol/L 4.4 4.5   CO2 mmol/L 30.0* 25.0   BUN mg/dL 25* 30*   CREATININE mg/dL 1.66* 1.67*         Microbiology Results (last 10 days)       ** No results found for the last 240 hours. **           Recent films:  XR Chest 1 View    Result Date: 10/27/2024  EXAMINATION: XR  CHEST 1 VW-  10/27/2024 9:41 AM  HISTORY: RIGHT side chest pain.  1 view chest x-ray.  COMPARISON: 10/8/2024 and older chest x-rays dating back to 10/23/2018.  RIGHT upper lung focal opacity again seen.  Interstitial fibrosis.  No pneumothorax.  Normal heart size.  Unchanged RIGHT chest wall port.      Impression: 1. Unchanged appearance of severe chronic lung changes and 3-4 cm RIGHT upper lobe opacity.    This report was signed and finalized on 10/27/2024 10:54 AM by Dr. Danilo Sebastian MD.      Personal review of imaging : CXR shows last chest x-ray done today shows severe chronic changes in the lung right upper lobe lung nodule or mass.      Pulmonary Assessment:  Acute on chronic hypoxic respiratory failure  Dependence on supplemental oxygen  Bilateral pulm infiltrate/post-COVID syndrome  Secondary bacterial pneumonia  History of interstitial lung disease  Chronic congestive diastolic heart failure with preserved EF.  Chronic obstructive pulmonary disease.  No carcinoma the right upper lobe of the lung with metastasis  Status post chemotherapy and radiation treatment  Allergic rhinitis    Recommend/plan:   Patient was seen in the follow-up visit in LTAC unit today.   Respiratory status stable continue supplemental oxygen.  He already had home oxygen.  He remains on 3 L oxygen which is his baseline.  Back pain improved  Patient is still complaining nasal dryness but did not have any epistaxis  Continue using saline gel and saline drops.  He had no further epistaxis.  Anticoagulation may be resumed  He is using saline spray and gel multiple times a day .  Use humidified oxygen all the time.  Patient has history of chronic lung disease with fibrosis and COPD and also had COVID in the past.  He requires little more oxygen during physical therapy but overall feeling better.  Renal function stable and improving  Continue physical activity incentive spirometry, bronchodilator treatment and routine respiratory  care  Oncology following for history of lung cancer.  No immediate treatment plan.  Will need outpatient follow-up  DVT and stress ulcer prophylaxis and pain and anxiety control.  Anticipated discharge in next few days.  Nutritional support.  Plan for outpatient follow-up in the pulmonary clinic with me in a month after discharge  CODE STATUS: Limited code.  Overall prognosis: Guarded.  We will follow.    Time spent by me: 35 min    Electronically signed by     Tarik Crocker MD,  Pulmonologist/Intensivist   10/28/2024, 15:28 CDT

## 2024-10-28 NOTE — PROGRESS NOTES
ROBSON Borjas APRN        Internal Medicine Progress Note    10/28/2024   09:42 CDT    Name:  Karoline Prater  MRN:    [id number]     Acct:     [id number]   Room:  [address] [place] [address] Day: 0     Admit Date: 10/7/2024  4:53 PM  PCP: Irving Alexis MD    Subjective:     C/C: weakness, shortness of breath    Interval History: Status:  improved. Up to chair. No family at bedside. Afebrile. Maintaining adequate 02 sats with 02 at 8 lpm. Heart rate in the 130s-160s. Palpated/auscultated heart rate in 70s-80s. Counts stable.     Review of Systems   Constitutional: Positive for malaise/fatigue. Negative for chills, decreased appetite, weight gain and weight loss.   HENT:  Negative for congestion, ear discharge, hoarse voice and tinnitus.    Eyes:  Negative for blurred vision, discharge, visual disturbance and visual halos.   Cardiovascular:  Positive for dyspnea on exertion. Negative for chest pain, claudication, irregular heartbeat, leg swelling, orthopnea and paroxysmal nocturnal dyspnea.   Respiratory:  Positive for shortness of breath. Negative for cough, sputum production and wheezing.    Endocrine: Negative for cold intolerance, heat intolerance and polyuria.   Hematologic/Lymphatic: Negative for adenopathy. Does not bruise/bleed easily.   Skin:  Negative for dry skin, itching and suspicious lesions.   Musculoskeletal:  Negative for arthritis, back pain, falls, joint pain, muscle weakness and myalgias.   Gastrointestinal:  Negative for abdominal pain, constipation, diarrhea, dysphagia and hematemesis.   Genitourinary:  Negative for bladder incontinence, dysuria and frequency.   Neurological:  Positive for weakness. Negative for aphonia, disturbances in coordination and dizziness.   Psychiatric/Behavioral:  Negative for altered mental status, depression, memory loss and substance abuse. The patient does not have insomnia and is not nervous/anxious.          Medications:      Allergies:   Allergies   Allergen Reactions    Penicillins Hives       Current Meds:   Current Facility-Administered Medications:     acetaminophen (TYLENOL) tablet 1,000 mg, 1,000 mg, Oral, Nightly, Edmond Izquierdo MD    aspirin EC tablet 81 mg, 81 mg, Oral, Daily, Edmond Izquierdo MD    atorvastatin (LIPITOR) tablet 20 mg, 20 mg, Oral, Nightly, Edmond Izquierdo MD    bisacodyl (DULCOLAX) EC tablet 5 mg, 5 mg, Oral, Daily, Sil Jensen APRN    bisacodyl (DULCOLAX) suppository 10 mg, 10 mg, Rectal, Daily PRN, Edmond Izquierdo MD    budesonide (PULMICORT) nebulizer solution 0.5 mg, 0.5 mg, Nebulization, BID - RT, Tarik Crocker MD    busPIRone (BUSPAR) tablet 5 mg, 5 mg, Oral, TID With Meals, Edmond Izquierdo MD    diphenhydrAMINE (BENADRYL) capsule 25 mg, 25 mg, Oral, Nightly PRN, Edmond Izquierdo MD    empagliflozin (JARDIANCE) tablet 10 mg, 10 mg, Oral, Daily, Edmond Izquierdo MD    guaiFENesin (MUCINEX) 12 hr tablet 600 mg, 600 mg, Oral, Q12H, Edmond Izquierdo MD    HYDROcodone-acetaminophen (NORCO) 5-325 MG per tablet 1 tablet, 1 tablet, Oral, TID, Edmond Izquierdo MD    hydrocortisone (ANUSOL-HC) suppository 25 mg, 25 mg, Rectal, Q12H PRN, Edmond Izquierdo MD    ipratropium-albuterol (DUO-NEB) nebulizer solution 3 mL, 3 mL, Nebulization, 4x Daily - RT, Edmond Izquierdo MD    isosorbide mononitrate (IMDUR) 24 hr tablet 30 mg, 30 mg, Oral, Daily Before Lunch, Edmond Izquierdo MD    melatonin tablet 10 mg, 10 mg, Oral, Nightly, Edmond Izquierdo MD    metoprolol succinate XL (TOPROL-XL) 24 hr tablet 25 mg, 25 mg, Oral, Daily, Edmond Izquierdo MD    nitroglycerin (NITROSTAT) SL tablet 0.4 mg, 0.4 mg, Sublingual, Q5 Min PRN, Edmond Izquierdo MD    nystatin (MYCOSTATIN) powder, , Topical, TID, Edmond Izquierdo MD    ondansetron ODT (ZOFRAN-ODT) disintegrating tablet 4 mg, 4 mg, Oral, Q6H PRN **OR**  "ondansetron (ZOFRAN) injection 4 mg, 4 mg, Intravenous, Q6H PRN, Edmond Izquierdo MD    pantoprazole (PROTONIX) EC tablet 40 mg, 40 mg, Oral, Daily, Edmond Izquierdo MD    polyethylene glycol (MIRALAX) packet 17 g, 17 g, Oral, Daily PRN, Edmond Izquierdo MD    predniSONE (DELTASONE) tablet 10 mg, 10 mg, Oral, Daily, Gareth Becerra MD    sodium bicarbonate tablet 650 mg, 650 mg, Oral, TID, Edmond Izquierdo MD    sodium chloride 0.9 % bolus 250 mL, 250 mL, Intravenous, PRN, Edmond Izquierdo MD    sodium chloride nasal spray 2 spray, 2 spray, Each Nare, 5x Daily, Edmond Izquierdo MD    sodium chloride nasal spray 2 spray, 2 spray, Each Nare, Continuous PRN, Edmond Izquierdo MD    tamsulosin (FLOMAX) 24 hr capsule 0.4 mg, 0.4 mg, Oral, Nightly, Edmond Izquierdo MD    traZODone (DESYREL) tablet 25 mg, 25 mg, Oral, Nightly PRN, Edmond Izquierdo MD    Data:     Code Status:    There are no questions and answers to display.       Family History   Problem Relation Age of Onset    Hypertension Mother     Leukemia Father        Social History     Socioeconomic History    Marital status: Single   Tobacco Use    Smoking status: Former     Current packs/day: 0.00     Types: Cigarettes     Start date: 10/29/1954     Quit date: 10/29/2003     Years since quittin.0    Smokeless tobacco: Former     Types: Chew     Quit date:    Vaping Use    Vaping status: Never Used   Substance and Sexual Activity    Alcohol use: No    Drug use: No    Sexual activity: Defer       Vitals:  Ht 162.6 cm (64\")   Wt 71.4 kg (157 lb 8 oz)   BMI 27.03 kg/m²   T 97.7 P 72 R 23  /75 Sp02 98% (8 lpm)          I/O (24Hr):  No intake or output data in the 24 hours ending 10/28/24 0942    Labs and imaging:      Recent Results (from the past 12 hours)   Basic Metabolic Panel    Collection Time: 10/28/24  5:06 AM    Specimen: Blood   Result Value Ref Range    Glucose 102 (H) 65 - 99 mg/dL "    BUN 25 (H) 8 - 23 mg/dL    Creatinine 1.66 (H) 0.76 - 1.27 mg/dL    Sodium 140 136 - 145 mmol/L    Potassium 4.4 3.5 - 5.2 mmol/L    Chloride 105 98 - 107 mmol/L    CO2 30.0 (H) 22.0 - 29.0 mmol/L    Calcium 9.8 8.6 - 10.5 mg/dL    BUN/Creatinine Ratio 15.1 7.0 - 25.0    Anion Gap 5.0 5.0 - 15.0 mmol/L    eGFR 40.9 (L) >60.0 mL/min/1.73   CBC Auto Differential    Collection Time: 10/28/24  5:06 AM    Specimen: Blood   Result Value Ref Range    WBC 8.00 3.40 - 10.80 10*3/mm3    RBC 4.16 4.14 - 5.80 10*6/mm3    Hemoglobin 11.6 (L) 13.0 - 17.7 g/dL    Hematocrit 40.4 37.5 - 51.0 %    MCV 97.1 (H) 79.0 - 97.0 fL    MCH 27.9 26.6 - 33.0 pg    MCHC 28.7 (L) 31.5 - 35.7 g/dL    RDW 19.1 (H) 12.3 - 15.4 %    RDW-SD 68.5 (H) 37.0 - 54.0 fl    MPV 12.5 (H) 6.0 - 12.0 fL    Platelets 102 (L) 140 - 450 10*3/mm3    Neutrophil % 78.9 (H) 42.7 - 76.0 %    Lymphocyte % 8.0 (L) 19.6 - 45.3 %    Monocyte % 6.0 5.0 - 12.0 %    Eosinophil % 6.0 0.3 - 6.2 %    Basophil % 0.3 0.0 - 1.5 %    Immature Grans % 0.8 (H) 0.0 - 0.5 %    Neutrophils, Absolute 6.32 1.70 - 7.00 10*3/mm3    Lymphocytes, Absolute 0.64 (L) 0.70 - 3.10 10*3/mm3    Monocytes, Absolute 0.48 0.10 - 0.90 10*3/mm3    Eosinophils, Absolute 0.48 (H) 0.00 - 0.40 10*3/mm3    Basophils, Absolute 0.02 0.00 - 0.20 10*3/mm3    Immature Grans, Absolute 0.06 (H) 0.00 - 0.05 10*3/mm3    nRBC 0.0 0.0 - 0.2 /100 WBC                                         Physical Examination:        Physical Exam  Vitals and nursing note reviewed.   Constitutional:       Appearance: He is well-developed.   HENT:      Head: Normocephalic and atraumatic.      Nose: Nose normal.      Mouth/Throat:      Mouth: Mucous membranes are moist.      Pharynx: Oropharynx is clear.   Eyes:      Pupils: Pupils are equal, round, and reactive to light.      Comments: Left eye enucleated   Cardiovascular:      Rate and Rhythm: Normal rate and regular rhythm.      Heart sounds: Normal heart sounds.   Pulmonary:       Effort: Pulmonary effort is normal.      Breath sounds: Normal breath sounds.   Abdominal:      General: Bowel sounds are normal.      Palpations: Abdomen is soft.   Musculoskeletal:         General: Normal range of motion.      Cervical back: Normal range of motion and neck supple.      Comments: Generalized weakness   Skin:     General: Skin is warm and dry.   Neurological:      Mental Status: He is alert and oriented to person, place, and time.      Deep Tendon Reflexes: Reflexes are normal and symmetric.   Psychiatric:         Behavior: Behavior normal.           Assessment:             Stage 3b chronic kidney disease    A-fib    Former smoker    COPD (chronic obstructive pulmonary disease)    History of radiation therapy    Chronic heart failure with preserved ejection fraction (HFpEF)    Atrial flutter    COVID-19 virus infection    Acute-on-chronic respiratory failure    Epistaxis    Chronic rhinitis    History of pneumonia    Supplemental oxygen dependent    Past Medical History:   Diagnosis Date    Arthritis     Atrial fibrillation with rapid ventricular response 05/31/2019    Blindness of left eye     BPH with obstruction/lower urinary tract symptoms     Cancer     stomach & lung    CHF (congestive heart failure)     CKD (chronic kidney disease) stage 3, GFR 30-59 ml/min     COPD with acute exacerbation 08/03/2024    Coronary artery disease     Elevated cholesterol     Essential hypertension 10/02/2017    Fibrotic lung diseases     GERD (gastroesophageal reflux disease)     Hearing loss     History of transfusion     Hydronephrosis of left kidney     Hyperlipidemia     Hypertension     pt was taken off of all of his medications for BP (atenolol, lisinopril, lasix) because his BP kept bottoming out so his primary dr told him to discontinue them 1-2 months ago (Jan/Feb 2019). pt states he takes no medications currently.    Lung cancer     Mass of duodenum versus letty hepatis  04/27/2019    Mass of left  renal hilum  04/27/2019    Mass of upper lobe of right lung 02/2019    mass is shrinking on its own, so pt states Dr. Patel is just going to keep an eye on it and not do surgery right now.    Mediastinal adenopathy 10/24/2018    [facility]    Monoclonal gammopathy of unknown significance (MGUS) 09/11/2018    Pancreatic mass     pt states he had this in 2013 but it went away on its own. Now recent CT shows it has come back so he is going to have an ultrasound on 3/13/19.    Shortness of breath         Plan:        Acute on chronic hypoxic respiratory failure  Stage 4 metastatic non small cell lung cancer  COPD  Interstitial lung disease  Chronic diastolic CHF  CKD3  Hyperkalemia  Multifactorial anemia  Anxiety  Hemorrhoids  Constipation    Continue current treatment. Monitor counts. Increase activity. Labs Thursday. Continue oxygen weaning as tolerated under direction of pulmonology. Aggressive therapies as tolerated. Maintain patient safety. Adjust bowel regimen as needed. EKG today.       Electronically signed by MARITZA Greenwood on 10/28/2024 at 09:42 CDT

## 2024-10-29 LAB
BASOPHILS # BLD AUTO: 0.02 10*3/MM3 (ref 0–0.2)
BASOPHILS NFR BLD AUTO: 0.3 % (ref 0–1.5)
DEPRECATED RDW RBC AUTO: 66.9 FL (ref 37–54)
EOSINOPHIL # BLD AUTO: 0.33 10*3/MM3 (ref 0–0.4)
EOSINOPHIL NFR BLD AUTO: 5 % (ref 0.3–6.2)
ERYTHROCYTE [DISTWIDTH] IN BLOOD BY AUTOMATED COUNT: 18.9 % (ref 12.3–15.4)
HCT VFR BLD AUTO: 38.7 % (ref 37.5–51)
HGB BLD-MCNC: 11.2 G/DL (ref 13–17.7)
IMM GRANULOCYTES # BLD AUTO: 0.03 10*3/MM3 (ref 0–0.05)
IMM GRANULOCYTES NFR BLD AUTO: 0.5 % (ref 0–0.5)
LYMPHOCYTES # BLD AUTO: 0.81 10*3/MM3 (ref 0.7–3.1)
LYMPHOCYTES NFR BLD AUTO: 12.2 % (ref 19.6–45.3)
MCH RBC QN AUTO: 27.8 PG (ref 26.6–33)
MCHC RBC AUTO-ENTMCNC: 28.9 G/DL (ref 31.5–35.7)
MCV RBC AUTO: 96 FL (ref 79–97)
MONOCYTES # BLD AUTO: 0.49 10*3/MM3 (ref 0.1–0.9)
MONOCYTES NFR BLD AUTO: 7.4 % (ref 5–12)
NEUTROPHILS NFR BLD AUTO: 4.96 10*3/MM3 (ref 1.7–7)
NEUTROPHILS NFR BLD AUTO: 74.6 % (ref 42.7–76)
NRBC BLD AUTO-RTO: 0 /100 WBC (ref 0–0.2)
PLATELET # BLD AUTO: 102 10*3/MM3 (ref 140–450)
PMV BLD AUTO: 12.1 FL (ref 6–12)
QT INTERVAL: 314 MS
QT INTERVAL: 420 MS
QTC INTERVAL: 400 MS
QTC INTERVAL: 466 MS
RBC # BLD AUTO: 4.03 10*6/MM3 (ref 4.14–5.8)
WBC NRBC COR # BLD AUTO: 6.64 10*3/MM3 (ref 3.4–10.8)

## 2024-10-29 PROCEDURE — 97530 THERAPEUTIC ACTIVITIES: CPT

## 2024-10-29 PROCEDURE — 85025 COMPLETE CBC W/AUTO DIFF WBC: CPT | Performed by: INTERNAL MEDICINE

## 2024-10-29 PROCEDURE — 97110 THERAPEUTIC EXERCISES: CPT

## 2024-10-29 PROCEDURE — 63710000001 PREDNISONE PER 5 MG: Performed by: INTERNAL MEDICINE

## 2024-10-29 PROCEDURE — 99233 SBSQ HOSP IP/OBS HIGH 50: CPT | Performed by: INTERNAL MEDICINE

## 2024-10-29 NOTE — PROGRESS NOTES
ROBSON Borjas APRN        Internal Medicine Progress Note    10/29/2024   12:35 CDT    Name:  Karoline Prater  MRN:    [id number]     Acct:     [id number]   Room:  [address] [place] [address] Day: 0     Admit Date: 10/7/2024  4:53 PM  PCP: Irving Alexis MD    Subjective:     C/C: weakness, shortness of breath    Interval History: Status:  improved. Up to chair. Visitor at bedside. Afebrile. Maintaining adequate 02 sats with 02 at 6 lpm. Heart rate stable. Progressing with therapies.  Counts stable. Anticipating discharge to home later this week.     Review of Systems   Constitutional: Positive for malaise/fatigue. Negative for chills, decreased appetite, weight gain and weight loss.   HENT:  Negative for congestion, ear discharge, hoarse voice and tinnitus.    Eyes:  Negative for blurred vision, discharge, visual disturbance and visual halos.   Cardiovascular:  Positive for dyspnea on exertion. Negative for chest pain, claudication, irregular heartbeat, leg swelling, orthopnea and paroxysmal nocturnal dyspnea.   Respiratory:  Positive for shortness of breath. Negative for cough, sputum production and wheezing.    Endocrine: Negative for cold intolerance, heat intolerance and polyuria.   Hematologic/Lymphatic: Negative for adenopathy. Does not bruise/bleed easily.   Skin:  Negative for dry skin, itching and suspicious lesions.   Musculoskeletal:  Negative for arthritis, back pain, falls, joint pain, muscle weakness and myalgias.   Gastrointestinal:  Negative for abdominal pain, constipation, diarrhea, dysphagia and hematemesis.   Genitourinary:  Negative for bladder incontinence, dysuria and frequency.   Neurological:  Positive for weakness. Negative for aphonia, disturbances in coordination and dizziness.   Psychiatric/Behavioral:  Negative for altered mental status, depression, memory loss and substance abuse. The patient does not have insomnia and is not nervous/anxious.           Medications:     Allergies:   Allergies   Allergen Reactions    Penicillins Hives       Current Meds:   Current Facility-Administered Medications:     acetaminophen (TYLENOL) tablet 1,000 mg, 1,000 mg, Oral, Nightly, Edmond Izquierdo MD    aspirin EC tablet 81 mg, 81 mg, Oral, Daily, Edmond Izquierdo MD    atorvastatin (LIPITOR) tablet 20 mg, 20 mg, Oral, Nightly, Edmond Izquierdo MD    bisacodyl (DULCOLAX) EC tablet 5 mg, 5 mg, Oral, Daily, Sil Jensen APRN    bisacodyl (DULCOLAX) suppository 10 mg, 10 mg, Rectal, Daily PRN, Edmond Izquierdo MD    budesonide (PULMICORT) nebulizer solution 0.5 mg, 0.5 mg, Nebulization, BID - RT, Tarik Crocker MD    busPIRone (BUSPAR) tablet 5 mg, 5 mg, Oral, TID With Meals, Edmond Izquierdo MD    diphenhydrAMINE (BENADRYL) capsule 25 mg, 25 mg, Oral, Nightly PRN, Edmond Izquierdo MD    empagliflozin (JARDIANCE) tablet 10 mg, 10 mg, Oral, Daily, Edmond Izquierdo MD    guaiFENesin (MUCINEX) 12 hr tablet 600 mg, 600 mg, Oral, Q12H, Edmond Izquierdo MD    HYDROcodone-acetaminophen (NORCO) 5-325 MG per tablet 1 tablet, 1 tablet, Oral, TID, Edmond Izquierdo MD    hydrocortisone (ANUSOL-HC) suppository 25 mg, 25 mg, Rectal, Q12H PRN, Edmond Izquierdo MD    ipratropium-albuterol (DUO-NEB) nebulizer solution 3 mL, 3 mL, Nebulization, 4x Daily - RT, Edmond Izquierdo MD    isosorbide mononitrate (IMDUR) 24 hr tablet 30 mg, 30 mg, Oral, Daily Before Lunch, Edmond Izquierdo MD    melatonin tablet 10 mg, 10 mg, Oral, Nightly, Edmond Izquierdo MD    metoprolol succinate XL (TOPROL-XL) 24 hr tablet 25 mg, 25 mg, Oral, Daily, Edmond Izquierdo MD    nitroglycerin (NITROSTAT) SL tablet 0.4 mg, 0.4 mg, Sublingual, Q5 Min PRN, Edmond Izquierdo MD    nystatin (MYCOSTATIN) powder, , Topical, TID, Edmond Izquierdo MD    ondansetron ODT (ZOFRAN-ODT) disintegrating tablet 4 mg, 4 mg, Oral,  "Q6H PRN **OR** ondansetron (ZOFRAN) injection 4 mg, 4 mg, Intravenous, Q6H PRN, Edmond Izquierdo MD    pantoprazole (PROTONIX) EC tablet 40 mg, 40 mg, Oral, Daily, Edmond Izquierdo MD    polyethylene glycol (MIRALAX) packet 17 g, 17 g, Oral, Daily PRN, Edmond Izquierdo MD    predniSONE (DELTASONE) tablet 10 mg, 10 mg, Oral, Daily, Gareth Becrera MD    rivaroxaban (XARELTO) tablet 15 mg, 15 mg, Oral, Daily With Dinner, Edmond Izquierdo MD    sodium bicarbonate tablet 650 mg, 650 mg, Oral, TID, Edmond Izquierdo MD    sodium chloride 0.9 % bolus 250 mL, 250 mL, Intravenous, PRN, Edmond Izquierdo MD    sodium chloride nasal spray 2 spray, 2 spray, Each Nare, 5x Daily, Edmond Izquierdo MD    sodium chloride nasal spray 2 spray, 2 spray, Each Nare, Continuous PRN, Edmond Izquierdo MD    tamsulosin (FLOMAX) 24 hr capsule 0.4 mg, 0.4 mg, Oral, Nightly, Edmond Izquierdo MD    traZODone (DESYREL) tablet 25 mg, 25 mg, Oral, Nightly PRN, Edmond Izquierdo MD    Data:     Code Status:    There are no questions and answers to display.       Family History   Problem Relation Age of Onset    Hypertension Mother     Leukemia Father        Social History     Socioeconomic History    Marital status: Single   Tobacco Use    Smoking status: Former     Current packs/day: 0.00     Types: Cigarettes     Start date: 10/29/1954     Quit date: 10/29/2003     Years since quittin.0    Smokeless tobacco: Former     Types: Chew     Quit date:    Vaping Use    Vaping status: Never Used   Substance and Sexual Activity    Alcohol use: No    Drug use: No    Sexual activity: Defer       Vitals:  Ht 162.6 cm (64\")   Wt 71.4 kg (157 lb 8 oz)   BMI 27.03 kg/m²   T 97.7 P 67 R 24  /67 Sp02 96% (6 lpm)          I/O (24Hr):  No intake or output data in the 24 hours ending 10/29/24 1235    Labs and imaging:      Recent Results (from the past 12 hours)   CBC Auto Differential    " Collection Time: 10/29/24  3:58 AM    Specimen: Blood   Result Value Ref Range    WBC 6.64 3.40 - 10.80 10*3/mm3    RBC 4.03 (L) 4.14 - 5.80 10*6/mm3    Hemoglobin 11.2 (L) 13.0 - 17.7 g/dL    Hematocrit 38.7 37.5 - 51.0 %    MCV 96.0 79.0 - 97.0 fL    MCH 27.8 26.6 - 33.0 pg    MCHC 28.9 (L) 31.5 - 35.7 g/dL    RDW 18.9 (H) 12.3 - 15.4 %    RDW-SD 66.9 (H) 37.0 - 54.0 fl    MPV 12.1 (H) 6.0 - 12.0 fL    Platelets 102 (L) 140 - 450 10*3/mm3    Neutrophil % 74.6 42.7 - 76.0 %    Lymphocyte % 12.2 (L) 19.6 - 45.3 %    Monocyte % 7.4 5.0 - 12.0 %    Eosinophil % 5.0 0.3 - 6.2 %    Basophil % 0.3 0.0 - 1.5 %    Immature Grans % 0.5 0.0 - 0.5 %    Neutrophils, Absolute 4.96 1.70 - 7.00 10*3/mm3    Lymphocytes, Absolute 0.81 0.70 - 3.10 10*3/mm3    Monocytes, Absolute 0.49 0.10 - 0.90 10*3/mm3    Eosinophils, Absolute 0.33 0.00 - 0.40 10*3/mm3    Basophils, Absolute 0.02 0.00 - 0.20 10*3/mm3    Immature Grans, Absolute 0.03 0.00 - 0.05 10*3/mm3    nRBC 0.0 0.0 - 0.2 /100 WBC   ECG 12 Lead Rhythm Change    Collection Time: 10/29/24  4:40 AM   Result Value Ref Range    QT Interval 420 ms    QTC Interval 466 ms                                         Physical Examination:        Physical Exam  Vitals and nursing note reviewed.   Constitutional:       Appearance: He is well-developed.   HENT:      Head: Normocephalic and atraumatic.      Nose: Nose normal.      Mouth/Throat:      Mouth: Mucous membranes are moist.      Pharynx: Oropharynx is clear.   Eyes:      Pupils: Pupils are equal, round, and reactive to light.      Comments: Left eye enucleated   Cardiovascular:      Rate and Rhythm: Normal rate and regular rhythm.      Heart sounds: Normal heart sounds.   Pulmonary:      Effort: Pulmonary effort is normal.      Breath sounds: Normal breath sounds.   Abdominal:      General: Bowel sounds are normal.      Palpations: Abdomen is soft.   Musculoskeletal:         General: Normal range of motion.      Cervical back: Normal  range of motion and neck supple.      Comments: Generalized weakness   Skin:     General: Skin is warm and dry.   Neurological:      Mental Status: He is alert and oriented to person, place, and time.      Deep Tendon Reflexes: Reflexes are normal and symmetric.   Psychiatric:         Behavior: Behavior normal.           Assessment:             Stage 3b chronic kidney disease    A-fib    Former smoker    COPD (chronic obstructive pulmonary disease)    History of radiation therapy    Chronic heart failure with preserved ejection fraction (HFpEF)    Atrial flutter    COVID-19 virus infection    Acute-on-chronic respiratory failure    Epistaxis    Chronic rhinitis    History of pneumonia    Supplemental oxygen dependent    Past Medical History:   Diagnosis Date    Arthritis     Atrial fibrillation with rapid ventricular response 05/31/2019    Blindness of left eye     BPH with obstruction/lower urinary tract symptoms     Cancer     stomach & lung    CHF (congestive heart failure)     CKD (chronic kidney disease) stage 3, GFR 30-59 ml/min     COPD with acute exacerbation 08/03/2024    Coronary artery disease     Elevated cholesterol     Essential hypertension 10/02/2017    Fibrotic lung diseases     GERD (gastroesophageal reflux disease)     Hearing loss     History of transfusion     Hydronephrosis of left kidney     Hyperlipidemia     Hypertension     pt was taken off of all of his medications for BP (atenolol, lisinopril, lasix) because his BP kept bottoming out so his primary dr told him to discontinue them 1-2 months ago (Jan/Feb 2019). pt states he takes no medications currently.    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 Patel is just going to keep an eye on it and not do surgery right now.    Mediastinal adenopathy 10/24/2018    [facility]    Monoclonal  gammopathy of unknown significance (MGUS) 09/11/2018    Pancreatic mass     pt states he had this in 2013 but it went away on its own. Now recent CT shows it has come back so he is going to have an ultrasound on 3/13/19.    Shortness of breath         Plan:        Acute on chronic hypoxic respiratory failure  Stage 4 metastatic non small cell lung cancer  COPD  Interstitial lung disease  Chronic diastolic CHF  CKD3  Hyperkalemia  Multifactorial anemia  Anxiety  Hemorrhoids  Constipation    Continue current treatment. Monitor counts. Increase activity. Labs Thursday. Continue oxygen weaning as tolerated under direction of pulmonology. Aggressive therapies as tolerated. Maintain patient safety. Adjust bowel regimen as needed.       Electronically signed by MARITZA Greenwood on 10/29/2024 at 12:35 CDT

## 2024-10-29 NOTE — PROGRESS NOTES
Progress Note    Patient name: Karoline Prater  Patient : 1940  VISIT # [id number]  MR #[id number]  Room:     Subjective     He is having nasal irritation from the nasal cannula with some localized irritation and bleeding, this is being addressed.    Harry has dyspnea this morning.  Oxygen saturation in the low 80s.  I called in his nurse Katherine STEVENS who adjusted his oxygen and will be monitoring closely.  O2 sats improved quickly. 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   CKD and chemotherapy associated ANEMIA, previously on Procrit, currently on hold having completed chemotherapy with further improvement in hemoglobin.  Pancreatic mass diagnosed 10/29/03 negative on open biopsy  BPH on Flomax  Orthostatic hypotension medications managed by Dr. Reuben Vásquez completed 4 cycles of combination chemotherapy with carboplatin, Keytruda, Alimta on 2019.    Maintenance Keytruda and Alimta every 3 weeks was delivered.    The final cycle number #25 of Keytruda/Alimta was delivered on 10/29/2020.     Treatment was held since that time due to issues of dyspnea and shortness of breath, requiring steroids.      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      Mr. Prater has had several hospitalizations and exacerbations of COPD and complications with pulmonary fibrosis in addition to the question of possible progressive disease including possible lymphangitic spread.       He is currently, 10/17/2024, on LTAC  I have been asked to see him for comment and opinion regarding recent imaging  "findings.  Below are findings and interactions I have had and monitoring Mr. Prater's history of lung cancer.     I discussed imaging with Dr Cool on 7/3/2024 regarding the June 2024 imaging.  Dr. Cool was gracious enough to review imaging studies with the following assessment by Dr. Danny Cool:  \"I have reviewed the CT scan of the thorax on this admission for atrial fibrillation with rapid ventricular response.     He completed recent SBRT radiotherapy for 2 right lung nodules on February 7, 2024.     Review of the current CT scan appears consistent with postradiation change.  We cannot entirely rule out progression however this appears unlikely with the chronology and radiographic appearance.     He is scheduled to return to our department in approximately 1 month with follow-up CT scan of the thorax and we will follow this closely with serial CT scans of the thorax.\"           CT scan of the chest without contrast on 9/21/2024 with addendum report is as follows:  Spiculated masslike opacity of the right upper lobe, similar to only minimally decreased compared to 8/3/2024, increased compared to 6/29/2024. Neoplastic process must be considered.   Stable scarring suspected of the right lung apex. Nonspecific irregular 2 x 1 cm nodular focus right middle lobe for which continued imaging surveillance recommended.   Advanced chronic interstitial lung disease with bronchiectasis.   No pneumothorax. Diffusely irregular right pleural thickening remains stable.     This report was signed and finalized on 9/21/2024 1:13 PM by Dr. Betsey Wells MD.     ADDENDUM: Request from Dr. Michele's office for additional comparison  with the earlier imaging examinations, comparison is made with PET/CT  8/20/2024, CT chest without contrast 8/3/2024, CT chest without contrast  6/29/2024, CT chest without contrast 5/6/2024.     Measurements of the spiculated mass in the right upper lobe:     CT chest without contrast 5/6/2024: " Approximately 4.0 x 2.2 cm     CT chest without contrast 6/29/202, Approximately 4.7 x 2.1 cm     CT chest without contrast 8/3/2024: Approximately 4.9 x 4.2 cm     PET/CT 8/20/2024: Approximately 3.6 x 1.8 cm.     CT chest without contrast 9/21/2024: Approximately 4.2 x 3.6 cm.     The mass was slowly increasing in size from May, 2024 through August 3,  2024, then it was smaller on the PET/CT from 8/20/2024, which could be a  positive treatment response. The mass then increased in size on  9/21/2024 and that measurement includes more confluent soft tissue  attenuation against the lateral pleural surface, which could be due to  increased volume of tumor tissue, or confluent surrounding infiltrate or  progressive fibrosis. A repeat PET/CT could help determine if the  increased size and volume of the mass is related to avid tumor or due to  an increase in surrounding infiltrate and fibrosis.     This report was signed and finalized on 10/7/2024 3:44 PM by Dr. Allen Churchill MD.             CTA chest at [facility] on 10/3/2024 reported the following:  Similar masslike spiculated opacity at the RIGHT upper lobe. Similar interlobular septal thickening with increased patchy opacities especially in the RIGHT lung. Appearance highly concerning for malignancy with lymphangitic spread of tumor.   Similar mild adenopathy.  Coronary artery calcifications with metallic device in the RIGHT  ventricular apex, favor wireless pulse generator.  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             I agree with the radiologist's interpretation that the actual tumor mass within the radiation therapy field is similar to previous imaging.  I also agree that there are parenchymal interstitial changes that come and go, sometimes are worse sometimes are diminished with treatment.     In discussion with Dr. Gordon, Mr. Prater appears to be improving since on LTAC anticipating likely improvement in the imaging of the  interstitial lung disease problems.      REVIEW OF SYSTEMS:   History obtained from chart review and the patient  General: positive for  - fatigue   ENT: negative for - oral lesions  Allergy and Immunology: negative   Hematological and Lymphatic:negative for - weight loss  Respiratory:  Baseline dyspnea at rest, with nasal cannula present but decreased compared to admission to LTAC.  Some dyspnea on exertion but able to walk across the room with assistance and oxygen supplementation   Cardiovascular: negative for - chest pain or palpitations  Gastrointestinal: positive for - appetite loss and diarrhea  Genito-Urinary: negative for - dysuria or hematuria  Musculoskeletal: positive for - generalized weakness  Neurological: negative for - confusion, dizziness or headaches  Dermatological: negative for - pruritus and rash    Objective     Vital Signs     No intake or output data in the 24 hours ending 10/29/24 0741    PHYSICAL EXAM:  General Appearance: Alert, cooperative, in no acute distress  Head: Normocephalic, without obvious abnormality, atraumatic  Eyes: Normal conjunctivae and sclerae, anicteric  Ears: Ears appear intact with no abnormalities noted, hearing grossly normal  Throat: No oral lesions, no thrush, oral mucosa moist  Neck: No adenopathy, supple, trachea midline, no JVD  Lungs: Scattered diffuse rales at bases, but without wheezing and with fairly clear breath sounds anteriorly   Heart: Regular rhythm and normal rate, no murmurs, gallops or rubs  Abdomen: Normal bowel sounds, no masses, no organomegaly, soft non-tender, non-distended  Genitalia: Deferred  Extremities: Moves all extremities equally well, no edema, no cyanosis, no redness  Skin: No bleeding, bruising or rash  Lymph nodes: No palpable adenopathy  Neurologic: Grossly intact though not formally tested        CBC  Results from last 7 days   Lab Units 10/29/24  0358 10/28/24  0506 10/24/24  0355   WBC 10*3/mm3 6.64 8.00 8.32   HEMOGLOBIN g/dL  "11.2* 11.6* 11.0*   HEMATOCRIT % 38.7 40.4 37.8   PLATELETS 10*3/mm3 102* 102* 92*       CMP  Lab Results   Component Value Date    GLUCOSE 102 (H) 10/28/2024    BUN 25 (H) 10/28/2024    CREATININE 1.66 (H) 10/28/2024    EGFRIFNONA 29 (L) 08/27/2021    BCR 15.1 10/28/2024    CO2 30.0 (H) 10/28/2024    CALCIUM 9.8 10/28/2024    ALBUMIN 2.7 (L) 10/09/2024    AST 18 10/09/2024    ALT 24 10/09/2024             No results found for: \"BLOODCX\", \"URINECX\", \"WOUNDCX\", \"MRSACX\", \"RESPCX\", \"STOOLCX\"    Imaging Results (Last 72 Hours)       Procedure Component Value Units Date/Time    XR Chest 1 View [[id number]] Collected: 10/27/24 1053     Updated: 10/27/24 1058    Narrative:      EXAMINATION: XR CHEST 1 VW-     10/27/2024 9:41 AM     HISTORY: RIGHT side chest pain.     1 view chest x-ray.     COMPARISON:  10/8/2024 and older chest x-rays dating back to 10/23/2018.     RIGHT upper lung focal opacity again seen.     Interstitial fibrosis.     No pneumothorax.     Normal heart size.     Unchanged RIGHT chest wall port.       Impression:      1. Unchanged appearance of severe chronic lung changes and 3-4 cm RIGHT  upper lobe opacity.           This report was signed and finalized on 10/27/2024 10:54 AM by Dr. Danilo Sebastian MD.                 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   CKD and chemotherapy associated ANEMIA, " "previously on Procrit, currently on hold having completed chemotherapy with further improvement in hemoglobin.  Pancreatic mass diagnosed 10/29/03 negative on open biopsy  BPH on Flomax  Orthostatic hypotension medications managed by Dr. Alexis     Fahad completed 4 cycles of combination chemotherapy with carboplatin, Keytruda, Alimta on 7/5/2019.    Maintenance Keytruda and Alimta every 3 weeks was delivered.    The final cycle number #25 of Keytruda/Alimta was delivered on 10/29/2020.     Treatment was held since that time due to issues of dyspnea and shortness of breath, requiring steroids.      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      Mr. Prater has had several hospitalizations and exacerbations of COPD and complications with pulmonary fibrosis in addition to the question of possible progressive disease including possible lymphangitic spread.       He is currently, 10/17/2024, on LTAC  I have been asked to see him for comment and opinion regarding recent imaging findings.  Below are findings and interactions I have had and monitoring Mr. Prater's history of lung cancer.     I discussed imaging with Dr Cool on 7/3/2024 regarding the June 2024 imaging.  Dr. Cool was gracious enough to review imaging studies with the following assessment by Dr. Danny Cool:  \"I have reviewed the CT scan of the thorax on this admission for atrial fibrillation with rapid ventricular response.     He completed recent SBRT radiotherapy for 2 right lung nodules on February 7, 2024.     Review of the current CT scan appears consistent with postradiation change.  We cannot entirely rule out progression however this appears unlikely with the chronology and radiographic appearance.     He is scheduled to return to our department in approximately 1 month with " "follow-up CT scan of the thorax and we will follow this closely with serial CT scans of the thorax.\"           CT scan of the chest without contrast on 9/21/2024 with addendum report is as follows:  Spiculated masslike opacity of the right upper lobe, similar to only minimally decreased compared to 8/3/2024, increased compared to 6/29/2024. Neoplastic process must be considered.   Stable scarring suspected of the right lung apex. Nonspecific irregular 2 x 1 cm nodular focus right middle lobe for which continued imaging surveillance recommended.   Advanced chronic interstitial lung disease with bronchiectasis.   No pneumothorax. Diffusely irregular right pleural thickening remains stable.     This report was signed and finalized on 9/21/2024 1:13 PM by Dr. Betsey Wells MD.     ADDENDUM: Request from Dr. Michele's office for additional comparison  with the earlier imaging examinations, comparison is made with PET/CT  8/20/2024, CT chest without contrast 8/3/2024, CT chest without contrast  6/29/2024, CT chest without contrast 5/6/2024.     Measurements of the spiculated mass in the right upper lobe:     CT chest without contrast 5/6/2024: Approximately 4.0 x 2.2 cm     CT chest without contrast 6/29/202, Approximately 4.7 x 2.1 cm     CT chest without contrast 8/3/2024: Approximately 4.9 x 4.2 cm     PET/CT 8/20/2024: Approximately 3.6 x 1.8 cm.     CT chest without contrast 9/21/2024: Approximately 4.2 x 3.6 cm.     The mass was slowly increasing in size from May, 2024 through August 3,  2024, then it was smaller on the PET/CT from 8/20/2024, which could be a  positive treatment response. The mass then increased in size on  9/21/2024 and that measurement includes more confluent soft tissue  attenuation against the lateral pleural surface, which could be due to  increased volume of tumor tissue, or confluent surrounding infiltrate or  progressive fibrosis. A repeat PET/CT could help determine if the  increased " size and volume of the mass is related to avid tumor or due to  an increase in surrounding infiltrate and fibrosis.     This report was signed and finalized on 10/7/2024 3:44 PM by Dr. Allen Churchill MD.             CTA chest at [facility] on 10/3/2024 reported the following:  Similar masslike spiculated opacity at the RIGHT upper lobe. Similar interlobular septal thickening with increased patchy opacities especially in the RIGHT lung. Appearance highly concerning for malignancy with lymphangitic spread of tumor.   Similar mild adenopathy.  Coronary artery calcifications with metallic device in the RIGHT  ventricular apex, favor wireless pulse generator.  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             I agree with the radiologist's interpretation that the actual tumor mass within the radiation therapy field is similar to previous imaging.  I also agree that there are parenchymal interstitial changes that come and go, sometimes are worse sometimes are diminished with treatment.     In discussion with Dr. Gordon, Mr. Prater appears to be improving since on LTAC anticipating likely improvement in the imaging of the interstitial lung disease problems.    Dyspnea- stable CXR      RECOMMENDATIONS:  Repeat CT scan of the chest in 2 to 3 weeks for comparison.  May even consider PET scan if CT scan looks better.  Hopefully we will be able to continue on a chemotherapy holiday without systemic intervention.  Mr. Prater has not been in favor of further systemic therapy over the last couple years and indeed chances of complications would increase given his marginal pulmonary reserve.  So far I do not see obvious evidence of progression.     Mr. Prater was encouraged with this assessment.    Mr. Prater is looking forward to advancing in activity and exercise with physical therapy this week      Jeff Michele MD  10/29/24  07:41 CDT

## 2024-10-29 NOTE — PROGRESS NOTES
[facility] [place] - [facility]    Patient: Karoline Prater    1940   Acct# [id number]  10/29/24   17:14 CDT  Referring Provider: Edmond Izquierdo MD  Chief Complaint: Acute on chronic respiratory failure    Interval history: He was seen in the follow-up visit in LTAC unit today.  He is doing better and currently on oxygen 3 L.  Today his oxygen requirement has gone up and currently he is requiring 4 L of oxygen although his baseline is at 3 L all the time at home.  Nasal dryness present but better.  Plan for discharge home tomorrow.    Physical Exam:  Vital signs: T: 97.7   BP: 137/75   P: 72   R: 23   sat: 98%  Physical Exam  Constitutional:       General: He is not in acute distress.     Interventions: Nasal cannula in place.      Comments: 3l   HENT:      Head: Normocephalic.      Nose: Nose normal.      Mouth/Throat:      Mouth: Mucous membranes are moist.   Eyes:      General: No scleral icterus.  Cardiovascular:      Rate and Rhythm: Normal rate.   Pulmonary:      Effort: No respiratory distress.      Breath sounds: Decreased breath sounds present.   Abdominal:      General: There is no distension.   Skin:     Findings: Bruising present.   Neurological:      Mental Status: He is alert. Mental status is at baseline.   Psychiatric:         Mood and Affect: Mood normal.         Behavior: Behavior is cooperative.       Laboratory Data:  Results from last 7 days   Lab Units 10/29/24  0358 10/28/24  0506 10/24/24  0355   WBC 10*3/mm3 6.64 8.00 8.32   HEMOGLOBIN g/dL 11.2* 11.6* 11.0*   PLATELETS 10*3/mm3 102* 102* 92*     Results from last 7 days   Lab Units 10/28/24  0506 10/24/24  0355   SODIUM mmol/L 140 141   POTASSIUM mmol/L 4.4 4.5   CO2 mmol/L 30.0* 25.0   BUN mg/dL 25* 30*   CREATININE mg/dL 1.66* 1.67*         Microbiology Results (last 10 days)       ** No results found for the last 240 hours. **           Recent films:  No radiology results  from the last 24 hrs   Personal review of imaging : CXR shows last chest x-ray done today shows severe chronic changes in the lung right upper lobe lung nodule or mass.      Pulmonary Assessment:  Acute on chronic hypoxic respiratory failure  Dependence on supplemental oxygen  Bilateral pulm infiltrate/post-COVID syndrome  Secondary bacterial pneumonia  History of interstitial lung disease  Chronic congestive diastolic heart failure with preserved EF.  Chronic obstructive pulmonary disease.  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   Continue supplemental oxygen and titrate to keep saturation more than 92%.  He needs 4 L oxygen now  4 L at the time of rest and 6 L at the time of exercise will be continued.  Patient is still complaining nasal dryness but did not have any epistaxis.  Overall dryness is improved  He has some anxiety about his nasal dryness and epistaxis but overall feeling better  He will follow-up with oncology for history of lung cancer.  No plan for immediate chemotherapy  He is using saline spray and gel multiple times a day .  Use humidified oxygen all the time.  Patient has history of chronic lung disease with fibrosis and COPD and also had COVID in the past.  He requires little more oxygen during physical therapy but overall feeling better.  Renal function stable and improving  Continue physical activity incentive spirometry, bronchodilator treatment and routine respiratory care  Oncology following for history of lung cancer.  No immediate treatment plan.  Will need outpatient follow-up  DVT and stress ulcer prophylaxis and pain and anxiety control.  Anticipated discharge in next few days.  Nutritional support.  Plan for outpatient follow-up in the pulmonary clinic with me in a month after discharge  CODE STATUS:  Limited code.  Overall prognosis: Guarded.  We will follow.    Time spent by me: 35 min    Electronically signed by     Tarik Crocker MD,  Pulmonologist/Intensivist   10/29/2024, 17:14 CDT

## 2024-10-30 PROCEDURE — 63710000001 DIPHENHYDRAMINE PER 50 MG: Performed by: INTERNAL MEDICINE

## 2024-10-30 PROCEDURE — 63710000001 PREDNISONE PER 5 MG: Performed by: INTERNAL MEDICINE

## 2024-10-30 PROCEDURE — 97530 THERAPEUTIC ACTIVITIES: CPT

## 2024-10-30 NOTE — DISCHARGE SUMMARY
ROBSON Borjas APRN      Internal Medicine Discharge Summary    Patient ID: Karoline Prater  MRN: [id number]     Acct:  [id number]       Patient's PCP: Irving Alexis MD    Admit Date: 10/7/2024     Discharge Date:   11/1/24    Admitting Physician: Edmond Izquierdo MD    Discharge Physician: MARITZA Greenwood     Active Discharge Diagnoses:  Acute on chronic hypoxic respiratory failure  Stage 4 metastatic non small cell lung cancer  COPD  Interstitial lung disease  Chronic diastolic CHF  CKD3  Hyperkalemia  Multifactorial anemia  Anxiety  Hemorrhoids  Constipation  Hospital Problems    Stage 3b chronic kidney disease    A-fib    Former smoker    COPD (chronic obstructive pulmonary disease)    History of radiation therapy    Chronic heart failure with preserved ejection fraction (HFpEF)    Atrial flutter    COVID-19 virus infection    Acute-on-chronic respiratory failure    Epistaxis    Chronic rhinitis    History of pneumonia    Supplemental oxygen dependent     Past Medical History:   Diagnosis Date    Arthritis     Atrial fibrillation with rapid ventricular response 05/31/2019    Blindness of left eye     BPH with obstruction/lower urinary tract symptoms     Cancer     stomach & lung    CHF (congestive heart failure)     CKD (chronic kidney disease) stage 3, GFR 30-59 ml/min     COPD with acute exacerbation 08/03/2024    Coronary artery disease     Elevated cholesterol     Essential hypertension 10/02/2017    Fibrotic lung diseases     GERD (gastroesophageal reflux disease)     Hearing loss     History of transfusion     Hydronephrosis of left kidney     Hyperlipidemia     Hypertension     pt was taken off of all of his medications for BP (atenolol, lisinopril, lasix) because his BP kept bottoming out so his primary dr told him to discontinue them 1-2 months ago (Jan/Feb 2019). pt states he takes no medications currently.    Lung cancer     Mass  of duodenum versus letty hepatis  04/27/2019    Mass of left renal hilum  04/27/2019    Mass of upper lobe of right lung 02/2019    mass is shrinking on its own, so pt states Dr. Patel is just going to keep an eye on it and not do surgery right now.    Mediastinal adenopathy 10/24/2018    Station 7    Monoclonal gammopathy of unknown significance (MGUS) 09/11/2018    Pancreatic mass     pt states he had this in 2013 but it went away on its own. Now recent CT shows it has come back so he is going to have an ultrasound on 3/13/19.    Shortness of breath        The patient was seen and examined on the day of discharge and this discharge summary is in conjunction with any daily progress note from day of discharge.    Code Status:    There are no questions and answers to display.       Hospital Course: Karoline Prater is a  84 y.o.  male who presents with need for continued oxygen weaning and rehabilitation efforts following a recent acute care stay. The patient, with a history of stage 4 lung cancer and COPD as well as atrial fibrillation s/p pacemaker, had been in his usual state of health when he contracted COVID on 9/5/24 and he was treated in the hospital and discharged on 9/8/24. He returned to ER on 9/20 with increased shortness of breath and was found to have temp of 101.3. He was placed on vapotherm to maintain adequate 02 sats and COVID PCR was found to be positive. He was diuresed due to evidence of fluid volume overload. Patient maintained adequate 02 sats with vapotherm, but would desat with any exertion. He was placed on IV antibiotics for concern for superimposed bacterial infection. He developed intermittent epistaxis and was evaluated by ENT who recommended continued nasal hygiene. The patient was evaluated by oncology who does not feel patient is a candidate for antineoplastic therapy at this time. Due to his need for continued oxygen weaning and rehabilitation efforts, he transferred to  our facility.   While at our facility, the patient was seen in consultation by pulmonology for continued oxygen management and weaning. Throughout his stay, the patient has consistently required 02 at 3-5 lpm at rest with up to 8-10 lpm with activity/exertion. His oxygen demand has decreased somewhat throughout his stay and his recovery time has improved. He has progressed with therapies. Oncology was consulted for continuity of care and scans were reviewed that failed to demonstrate any significant progression. Counts have remained stable. Patient was preparing for discharge yesterday when CT showed increased right lung consolidation and patient had a slight decline with increased congestion and shortness of breath. He was noted to have 3rd degree heart block on telemetry strip with no visible pacemaker spikes. His cardiologist was consulted but unfortunately, does not have privileges at our facility. He did review telemetry strips and pacemaker  was consulted. Pacemaker was interrogated and adjustments were made. Patient appears much more comfortable today with on further significant ectopy. At this point, the patient is felt stable for discharge to home with the assistance of family and home healthcare with close outpatient follow up. A 10 lpm oxygen concentrator has been secured for home use.     Consults:  Dr. Crocker (pulmonology)  Dr. Michele (oncology)    Disposition: home health    Physical Exam  Vitals and nursing note reviewed.   Constitutional:       Appearance: He is well-developed.   HENT:      Head: Normocephalic and atraumatic.      Nose: Nose normal.      Mouth/Throat:      Mouth: Mucous membranes are moist.      Pharynx: Oropharynx is clear.   Eyes:      Pupils: Pupils are equal, round, and reactive to light.      Comments: Left eye enucleated   Cardiovascular:      Rate and Rhythm: Normal rate and regular rhythm.      Heart sounds: Normal heart sounds.   Pulmonary:      Effort:  Pulmonary effort is normal.      Breath sounds: Normal breath sounds.   Abdominal:      General: Bowel sounds are normal.      Palpations: Abdomen is soft.   Musculoskeletal:         General: Normal range of motion.      Cervical back: Normal range of motion and neck supple.      Comments: Generalized weakness   Skin:     General: Skin is warm and dry.   Neurological:      Mental Status: He is alert and oriented to person, place, and time.      Deep Tendon Reflexes: Reflexes are normal and symmetric.   Psychiatric:         Behavior: Behavior normal.     Discharged Condition: Stable    Follow Up: PCP 1 week  Dr. Crocker 1 month  Oncology with CT scan in 2-3 weeks        Diet: Diet: Regular/House; Fluid Consistency: Thin (IDDSI 0)    Discharge Medications:   See computer generated medication reconciliation form      Time Spent on discharge is  32 minutes in patient examination, evaluation, patient/family counseling as well as medication reconciliation, prescriptions for required medications, discharge plan and follow up.     Electronically signed by MARITZA Greenwood on 10/30/2024 at 10:21 CDT     I have discussed the care of Karoline Prater, including pertinent history and exam findings, with the nurse practitioner.    I have seen and examined the patient and the key elements of all parts of the encounter have been performed by me.  I agree with the assessment, plan and orders as documented by MARITZA Frias, after I modified the exam findings and the plan of treatments and the final version is my approved version of the assessment.        Electronically signed by Edmond Izquierdo MD on 11/3/2024 at 19:05 CST

## 2024-10-30 NOTE — PROGRESS NOTES
Timbo Izquierdo M.D.  MARITZA Frias        Internal Medicine Progress Note    10/30/2024   09:28 CDT    Name:  Karoline Prater  MRN:    [id number]     Acct:     [id number]   Room:  [address] [place] [address] Day: 0     Admit Date: 10/7/2024  4:53 PM  PCP: Irving Alexis MD    Subjective:     C/C: weakness, shortness of breath    Interval History: Status:  improved. Resting in bed. No family at bedside. Afebrile. Maintaining adequate 02 sats with 02 at 6 lpm. Progressing with therapies. Reporting some increased shortness of breath - 02 sats stable in the high 90s, but patient stated that he was laying flat while he slept which usually makes him feel more short of breath. Anticipating discharge to home tomorrow.     Review of Systems   Constitutional: Positive for malaise/fatigue. Negative for chills, decreased appetite, weight gain and weight loss.   HENT:  Negative for congestion, ear discharge, hoarse voice and tinnitus.    Eyes:  Negative for blurred vision, discharge, visual disturbance and visual halos.   Cardiovascular:  Positive for dyspnea on exertion. Negative for chest pain, claudication, irregular heartbeat, leg swelling, orthopnea and paroxysmal nocturnal dyspnea.   Respiratory:  Positive for shortness of breath. Negative for cough, sputum production and wheezing.    Endocrine: Negative for cold intolerance, heat intolerance and polyuria.   Hematologic/Lymphatic: Negative for adenopathy. Does not bruise/bleed easily.   Skin:  Negative for dry skin, itching and suspicious lesions.   Musculoskeletal:  Negative for arthritis, back pain, falls, joint pain, muscle weakness and myalgias.   Gastrointestinal:  Negative for abdominal pain, constipation, diarrhea, dysphagia and hematemesis.   Genitourinary:  Negative for bladder incontinence, dysuria and frequency.   Neurological:  Positive for weakness. Negative for aphonia, disturbances in coordination and dizziness.    Psychiatric/Behavioral:  Negative for altered mental status, depression, memory loss and substance abuse. The patient does not have insomnia and is not nervous/anxious.          Medications:     Allergies:   Allergies   Allergen Reactions    Penicillins Hives       Current Meds:   Current Facility-Administered Medications:     acetaminophen (TYLENOL) tablet 1,000 mg, 1,000 mg, Oral, Nightly, Edmond zIquierdo MD    aspirin EC tablet 81 mg, 81 mg, Oral, Daily, Edmond Izquierdo MD    atorvastatin (LIPITOR) tablet 20 mg, 20 mg, Oral, Nightly, Edmond Izquierdo MD    bisacodyl (DULCOLAX) EC tablet 5 mg, 5 mg, Oral, Daily, Sil Jensen APRN    bisacodyl (DULCOLAX) suppository 10 mg, 10 mg, Rectal, Daily PRN, Edmond Izquierdo MD    budesonide (PULMICORT) nebulizer solution 0.5 mg, 0.5 mg, Nebulization, BID - RT, Tarik Crocker MD    busPIRone (BUSPAR) tablet 5 mg, 5 mg, Oral, TID With Meals, Edmond Izquierdo MD    diphenhydrAMINE (BENADRYL) capsule 25 mg, 25 mg, Oral, Nightly PRN, Edmond Izquierdo MD    empagliflozin (JARDIANCE) tablet 10 mg, 10 mg, Oral, Daily, Edmond Izquierdo MD    guaiFENesin (MUCINEX) 12 hr tablet 600 mg, 600 mg, Oral, Q12H, Edmond Izquierdo MD    HYDROcodone-acetaminophen (NORCO) 5-325 MG per tablet 1 tablet, 1 tablet, Oral, TID, Edmond Izquierdo MD    hydrocortisone (ANUSOL-HC) suppository 25 mg, 25 mg, Rectal, Q12H PRN, Edmond Izquierdo MD    ipratropium-albuterol (DUO-NEB) nebulizer solution 3 mL, 3 mL, Nebulization, 4x Daily - RT, Edmond Izquierdo MD    isosorbide mononitrate (IMDUR) 24 hr tablet 30 mg, 30 mg, Oral, Daily Before Lunch, Edmond Izquierdo MD    melatonin tablet 10 mg, 10 mg, Oral, Nightly, Edmond Izquierdo MD    metoprolol succinate XL (TOPROL-XL) 24 hr tablet 25 mg, 25 mg, Oral, Daily, Edmond Izquierdo MD    nitroglycerin (NITROSTAT) SL tablet 0.4 mg, 0.4 mg, Sublingual, Q5 Min PRN,  "Edmond Izquierdo MD    nystatin (MYCOSTATIN) powder, , Topical, TID, Edmond Izquierdo MD    ondansetron ODT (ZOFRAN-ODT) disintegrating tablet 4 mg, 4 mg, Oral, Q6H PRN **OR** ondansetron (ZOFRAN) injection 4 mg, 4 mg, Intravenous, Q6H PRN, Edmond Izquierdo MD    pantoprazole (PROTONIX) EC tablet 40 mg, 40 mg, Oral, Daily, Edmond Izquierdo MD    polyethylene glycol (MIRALAX) packet 17 g, 17 g, Oral, Daily PRN, Edmond Izquierdo MD    predniSONE (DELTASONE) tablet 10 mg, 10 mg, Oral, Daily, Gareth Becerra MD    rivaroxaban (XARELTO) tablet 15 mg, 15 mg, Oral, Daily With Dinner, Edmond Izquierdo MD    sodium bicarbonate tablet 650 mg, 650 mg, Oral, TID, Edmond Izquierdo MD    sodium chloride 0.9 % bolus 250 mL, 250 mL, Intravenous, PRN, Edmond Izquierdo MD    sodium chloride nasal spray 2 spray, 2 spray, Each Nare, 5x Daily, Edmond Izquierdo MD    sodium chloride nasal spray 2 spray, 2 spray, Each Nare, Continuous PRN, Edmond Izquierdo MD    tamsulosin (FLOMAX) 24 hr capsule 0.4 mg, 0.4 mg, Oral, Nightly, Edmond Izquierdo MD    traZODone (DESYREL) tablet 25 mg, 25 mg, Oral, Nightly PRN, Edmond Izquierdo MD    Data:     Code Status:    There are no questions and answers to display.       Family History   Problem Relation Age of Onset    Hypertension Mother     Leukemia Father        Social History     Socioeconomic History    Marital status: Single   Tobacco Use    Smoking status: Former     Current packs/day: 0.00     Types: Cigarettes     Start date: 10/29/1954     Quit date: 10/29/2003     Years since quittin.0    Smokeless tobacco: Former     Types: Chew     Quit date:    Vaping Use    Vaping status: Never Used   Substance and Sexual Activity    Alcohol use: No    Drug use: No    Sexual activity: Defer       Vitals:  Ht 162.6 cm (64\")   Wt 71.4 kg (157 lb 8 oz)   BMI 27.03 kg/m²   T 97.5 P 66 R 19  /60 Sp02 97% (6 " lpm)          I/O (24Hr):  No intake or output data in the 24 hours ending 10/30/24 0997    Labs and imaging:      No results found for this or any previous visit (from the past 12 hours).                                        Physical Examination:        Physical Exam  Vitals and nursing note reviewed.   Constitutional:       Appearance: He is well-developed.   HENT:      Head: Normocephalic and atraumatic.      Nose: Nose normal.      Mouth/Throat:      Mouth: Mucous membranes are moist.      Pharynx: Oropharynx is clear.   Eyes:      Pupils: Pupils are equal, round, and reactive to light.      Comments: Left eye enucleated   Cardiovascular:      Rate and Rhythm: Normal rate and regular rhythm.      Heart sounds: Normal heart sounds.   Pulmonary:      Effort: Pulmonary effort is normal.      Breath sounds: Normal breath sounds.   Abdominal:      General: Bowel sounds are normal.      Palpations: Abdomen is soft.   Musculoskeletal:         General: Normal range of motion.      Cervical back: Normal range of motion and neck supple.      Comments: Generalized weakness   Skin:     General: Skin is warm and dry.   Neurological:      Mental Status: He is alert and oriented to person, place, and time.      Deep Tendon Reflexes: Reflexes are normal and symmetric.   Psychiatric:         Behavior: Behavior normal.           Assessment:             Stage 3b chronic kidney disease    A-fib    Former smoker    COPD (chronic obstructive pulmonary disease)    History of radiation therapy    Chronic heart failure with preserved ejection fraction (HFpEF)    Atrial flutter    COVID-19 virus infection    Acute-on-chronic respiratory failure    Epistaxis    Chronic rhinitis    History of pneumonia    Supplemental oxygen dependent    Past Medical History:   Diagnosis Date    Arthritis     Atrial fibrillation with rapid ventricular response 05/31/2019    Blindness of left eye     BPH with obstruction/lower urinary tract symptoms      Cancer     stomach & lung    CHF (congestive heart failure)     CKD (chronic kidney disease) stage 3, GFR 30-59 ml/min     COPD with acute exacerbation 08/03/2024    Coronary artery disease     Elevated cholesterol     Essential hypertension 10/02/2017    Fibrotic lung diseases     GERD (gastroesophageal reflux disease)     Hearing loss     History of transfusion     Hydronephrosis of left kidney     Hyperlipidemia     Hypertension     pt was taken off of all of his medications for BP (atenolol, lisinopril, lasix) because his BP kept bottoming out so his primary dr told him to discontinue them 1-2 months ago (Jan/Feb 2019). pt states he takes no medications currently.    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 Patel is just going to keep an eye on it and not do surgery right now.    Mediastinal adenopathy 10/24/2018    Station 7    Monoclonal gammopathy of unknown significance (MGUS) 09/11/2018    Pancreatic mass     pt states he had this in 2013 but it went away on its own. Now recent CT shows it has come back so he is going to have an ultrasound on 3/13/19.    Shortness of breath         Plan:        Acute on chronic hypoxic respiratory failure  Stage 4 metastatic non small cell lung cancer  COPD  Interstitial lung disease  Chronic diastolic CHF  CKD3  Hyperkalemia  Multifactorial anemia  Anxiety  Hemorrhoids  Constipation    Continue current treatment. Monitor counts. Increase activity. Labs in am. Continue oxygen weaning as tolerated under direction of pulmonology. Aggressive therapies as tolerated. Maintain patient safety. Adjust bowel regimen as needed. Discharge planning.       Electronically signed by MARITZA Greenwood on 10/30/2024 at 09:28 CDT     I have discussed the care of Karoline Prater, including pertinent history and exam  findings, with the nurse practitioner.    I have seen and examined the patient and the key elements of all parts of the encounter have been performed by me.  I agree with the assessment, plan and orders as documented by MARITZA Frias, after I modified the exam findings and the plan of treatments and the final version is my approved version of the assessment.        Electronically signed by Edmond Izquierdo MD on 10/30/2024 at 11:06 CDT

## 2024-10-31 ENCOUNTER — APPOINTMENT (OUTPATIENT)
Dept: CT IMAGING | Facility: HOSPITAL | Age: 84
End: 2024-10-31
Payer: COMMERCIAL

## 2024-10-31 LAB
ANION GAP SERPL CALCULATED.3IONS-SCNC: 8 MMOL/L (ref 5–15)
BASOPHILS # BLD AUTO: 0.03 10*3/MM3 (ref 0–0.2)
BASOPHILS NFR BLD AUTO: 0.5 % (ref 0–1.5)
BUN SERPL-MCNC: 24 MG/DL (ref 8–23)
BUN/CREAT SERPL: 15.6 (ref 7–25)
CALCIUM SPEC-SCNC: 9.5 MG/DL (ref 8.6–10.5)
CHLORIDE SERPL-SCNC: 106 MMOL/L (ref 98–107)
CO2 SERPL-SCNC: 26 MMOL/L (ref 22–29)
CREAT SERPL-MCNC: 1.54 MG/DL (ref 0.76–1.27)
DEPRECATED RDW RBC AUTO: 66.3 FL (ref 37–54)
EGFRCR SERPLBLD CKD-EPI 2021: 44.8 ML/MIN/1.73
EOSINOPHIL # BLD AUTO: 0.41 10*3/MM3 (ref 0–0.4)
EOSINOPHIL NFR BLD AUTO: 7.1 % (ref 0.3–6.2)
ERYTHROCYTE [DISTWIDTH] IN BLOOD BY AUTOMATED COUNT: 18.7 % (ref 12.3–15.4)
GLUCOSE SERPL-MCNC: 105 MG/DL (ref 65–99)
HCT VFR BLD AUTO: 38.3 % (ref 37.5–51)
HGB BLD-MCNC: 11.1 G/DL (ref 13–17.7)
IMM GRANULOCYTES # BLD AUTO: 0.05 10*3/MM3 (ref 0–0.05)
IMM GRANULOCYTES NFR BLD AUTO: 0.9 % (ref 0–0.5)
LYMPHOCYTES # BLD AUTO: 0.65 10*3/MM3 (ref 0.7–3.1)
LYMPHOCYTES NFR BLD AUTO: 11.3 % (ref 19.6–45.3)
MCH RBC QN AUTO: 27.8 PG (ref 26.6–33)
MCHC RBC AUTO-ENTMCNC: 29 G/DL (ref 31.5–35.7)
MCV RBC AUTO: 95.8 FL (ref 79–97)
MONOCYTES # BLD AUTO: 0.43 10*3/MM3 (ref 0.1–0.9)
MONOCYTES NFR BLD AUTO: 7.5 % (ref 5–12)
NEUTROPHILS NFR BLD AUTO: 4.17 10*3/MM3 (ref 1.7–7)
NEUTROPHILS NFR BLD AUTO: 72.7 % (ref 42.7–76)
NRBC BLD AUTO-RTO: 0 /100 WBC (ref 0–0.2)
PLATELET # BLD AUTO: 108 10*3/MM3 (ref 140–450)
PMV BLD AUTO: 11.7 FL (ref 6–12)
POTASSIUM SERPL-SCNC: 4.3 MMOL/L (ref 3.5–5.2)
QT INTERVAL: 410 MS
QT INTERVAL: 412 MS
QTC INTERVAL: 435 MS
QTC INTERVAL: 448 MS
RBC # BLD AUTO: 4 10*6/MM3 (ref 4.14–5.8)
SODIUM SERPL-SCNC: 140 MMOL/L (ref 136–145)
WBC NRBC COR # BLD AUTO: 5.74 10*3/MM3 (ref 3.4–10.8)

## 2024-10-31 PROCEDURE — 85025 COMPLETE CBC W/AUTO DIFF WBC: CPT | Performed by: INTERNAL MEDICINE

## 2024-10-31 PROCEDURE — 93005 ELECTROCARDIOGRAM TRACING: CPT | Performed by: INTERNAL MEDICINE

## 2024-10-31 PROCEDURE — 80048 BASIC METABOLIC PNL TOTAL CA: CPT | Performed by: INTERNAL MEDICINE

## 2024-10-31 PROCEDURE — 71250 CT THORAX DX C-: CPT

## 2024-10-31 PROCEDURE — 63710000001 PREDNISONE PER 5 MG: Performed by: INTERNAL MEDICINE

## 2024-10-31 RX ORDER — ACETYLCYSTEINE 200 MG/ML
2 SOLUTION ORAL; RESPIRATORY (INHALATION)
Status: DISCONTINUED | OUTPATIENT
Start: 2024-10-31 | End: 2024-11-01 | Stop reason: HOSPADM

## 2024-10-31 NOTE — PROGRESS NOTES
"     Inpatient Progress Note        Results Review:   Results from last 7 days   Lab Units 10/31/24  0417 10/28/24  0506   SODIUM mmol/L 140 140   POTASSIUM mmol/L 4.3 4.4   CHLORIDE mmol/L 106 105   CO2 mmol/L 26.0 30.0*   BUN mg/dL 24* 25*   CALCIUM mg/dL 9.5 9.8     Results from last 7 days   Lab Units 10/31/24  0417 10/29/24  0358 10/28/24  0506   WBC 10*3/mm3 5.74 6.64 8.00   HEMOGLOBIN g/dL 11.1* 11.2* 11.6*   HEMATOCRIT % 38.3 38.7 40.4   PLATELETS 10*3/mm3 108* 102* 102*       Visit Vitals  Ht 162.6 cm (64\")   Wt 71.4 kg (157 lb 8 oz)   BMI 27.03 kg/m²            Medications:   acetaminophen, 1,000 mg, Oral, Nightly  aspirin, 81 mg, Oral, Daily  atorvastatin, 20 mg, Oral, Nightly  bisacodyl, 5 mg, Oral, Daily  budesonide, 0.5 mg, Nebulization, BID - RT  busPIRone, 5 mg, Oral, TID With Meals  empagliflozin, 10 mg, Oral, Daily  guaiFENesin, 600 mg, Oral, Q12H  HYDROcodone-acetaminophen, 1 tablet, Oral, TID  ipratropium-albuterol, 3 mL, Nebulization, 4x Daily - RT  isosorbide mononitrate, 30 mg, Oral, Daily Before Lunch  melatonin, 10 mg, Oral, Nightly  metoprolol succinate XL, 25 mg, Oral, Daily  nystatin, , Topical, TID  pantoprazole, 40 mg, Oral, Daily  predniSONE, 10 mg, Oral, Daily  rivaroxaban, 15 mg, Oral, Daily With Dinner  sodium bicarbonate, 650 mg, Oral, TID  sodium chloride, 2 spray, Each Nare, 5x Daily  tamsulosin, 0.4 mg, Oral, Nightly      sodium chloride, 2 spray                                             NAME: Karoline Prater  MRN: [id number]  AGE: 84 y.o.            : 1940  ADMISSION DATE: 10/7/2024  PRIMARY CARE PROVIDER: Irving Alexis MD  ATTENDING PHYSICIAN: Edmond Izquierdo MD                       Reason for Consult:  Acute hypoxic respiratory failure    Interval History:    No acute events overnight, patient resting bed breathing comfortably on 4 L.  O2 requirements fluctuate between 3-4 L while at rest.  He does require additional oxygen with exertion due to " desaturations.  CT chest is planned for today per oncology.    Review of Systems:  A full 12-point review of systems was performed and was negative except as documented in the HPI.                       Physical Exam:  General: No acute distress, cooperative  Head: Atraumatic, normocephalic, normal inspection  Eyes: Chronic changes   ENT: Mucous membranes moist, no thrush  Teeth/gums: Normal inspection  Heart: RRR, no murmur  Lungs: Diminished with coarse breath sounds bilaterally  MSK: No edema or clubbing  GI/abdominal: Soft, nontender  Neurologic: Alert, oriented.  Cranial nerves II through XII grossly intact.           Imaging Review:   No new imaging for review.                       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  This will likely be his new baseline.  Aside from the above there is not much else to add from a pulmonary standpoint for his current admission.  Thank you for allowing us to participate in this patient's care.  Pulmonology will sign off at this time, please feel free to contact us if we can be of further assistance.             Cornelio Gordon DO  Pulmonary/Critical Care  10/31/2024  09:36 CDT

## 2024-10-31 NOTE — PROGRESS NOTES
Progress Note    Patient name: Karoline Prater  Patient : 1940  VISIT # [id number]  MR #[id number]  Room:     Subjective     Fahad says he is breathing a bit better this morning.  Had a relatively good night.  O2 sat at rest in the mid 90s.    Exam continues to have bilateral rales and crackles, relatively unchanged.    It has been ~1 month since his last CT chest.  Plan to repeat CT chest without contrast today for comparison to better assess the possibility of lymphangitic spread?    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   CKD and chemotherapy associated ANEMIA, previously on Procrit, currently on hold having completed chemotherapy with further improvement in hemoglobin.  Pancreatic mass diagnosed 10/29/03 negative on open biopsy  BPH on Flomax  Orthostatic hypotension medications managed by Dr. Reuben Vásquez completed 4 cycles of combination chemotherapy with carboplatin, Keytruda, Alimta on 2019.    Maintenance Keytruda and Alimta every 3 weeks was delivered.    The final cycle number #25 of Keytruda/Alimta was delivered on 10/29/2020.     Treatment was held since that time due to issues of dyspnea and shortness of breath, requiring steroids.      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      Mr. Prater has had several hospitalizations and exacerbations of COPD and complications with pulmonary fibrosis in addition to the question of possible progressive disease including possible lymphangitic spread.       He is currently, 10/17/2024, on LTAC  I have been asked to see him for comment and opinion regarding  "recent imaging findings.  Below are findings and interactions I have had and monitoring Mr. Prater's history of lung cancer.     I discussed imaging with Dr Cool on 7/3/2024 regarding the June 2024 imaging.  Dr. Cool was gracious enough to review imaging studies with the following assessment by Dr. Danny Cool:  \"I have reviewed the CT scan of the thorax on this admission for atrial fibrillation with rapid ventricular response.     He completed recent SBRT radiotherapy for 2 right lung nodules on February 7, 2024.     Review of the current CT scan appears consistent with postradiation change.  We cannot entirely rule out progression however this appears unlikely with the chronology and radiographic appearance.     He is scheduled to return to our department in approximately 1 month with follow-up CT scan of the thorax and we will follow this closely with serial CT scans of the thorax.\"           CT scan of the chest without contrast on 9/21/2024 with addendum report is as follows:  Spiculated masslike opacity of the right upper lobe, similar to only minimally decreased compared to 8/3/2024, increased compared to 6/29/2024. Neoplastic process must be considered.   Stable scarring suspected of the right lung apex. Nonspecific irregular 2 x 1 cm nodular focus right middle lobe for which continued imaging surveillance recommended.   Advanced chronic interstitial lung disease with bronchiectasis.   No pneumothorax. Diffusely irregular right pleural thickening remains stable.     This report was signed and finalized on 9/21/2024 1:13 PM by Dr. Betsey Wells MD.     ADDENDUM: Request from Dr. Michele's office for additional comparison  with the earlier imaging examinations, comparison is made with PET/CT  8/20/2024, CT chest without contrast 8/3/2024, CT chest without contrast  6/29/2024, CT chest without contrast 5/6/2024.     Measurements of the spiculated mass in the right upper lobe:     CT chest without " contrast 5/6/2024: Approximately 4.0 x 2.2 cm     CT chest without contrast 6/29/202, Approximately 4.7 x 2.1 cm     CT chest without contrast 8/3/2024: Approximately 4.9 x 4.2 cm     PET/CT 8/20/2024: Approximately 3.6 x 1.8 cm.     CT chest without contrast 9/21/2024: Approximately 4.2 x 3.6 cm.     The mass was slowly increasing in size from May, 2024 through August 3,  2024, then it was smaller on the PET/CT from 8/20/2024, which could be a  positive treatment response. The mass then increased in size on  9/21/2024 and that measurement includes more confluent soft tissue  attenuation against the lateral pleural surface, which could be due to  increased volume of tumor tissue, or confluent surrounding infiltrate or  progressive fibrosis. A repeat PET/CT could help determine if the  increased size and volume of the mass is related to avid tumor or due to  an increase in surrounding infiltrate and fibrosis.     This report was signed and finalized on 10/7/2024 3:44 PM by Dr. Allen Churchill MD.             CTA chest at [facility] on 10/3/2024 reported the following:  Similar masslike spiculated opacity at the RIGHT upper lobe. Similar interlobular septal thickening with increased patchy opacities especially in the RIGHT lung. Appearance highly concerning for malignancy with lymphangitic spread of tumor.   Similar mild adenopathy.  Coronary artery calcifications with metallic device in the RIGHT  ventricular apex, favor wireless pulse generator.  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             I agree with the radiologist's interpretation that the actual tumor mass within the radiation therapy field is similar to previous imaging.  I also agree that there are parenchymal interstitial changes that come and go, sometimes are worse sometimes are diminished with treatment.     In discussion with Dr. Gordon, Mr. Prater appears to be improving since on LTAC anticipating likely improvement in the  imaging of the interstitial lung disease problems.        REVIEW OF SYSTEMS:   History obtained from chart review and the patient  General: positive for  - fatigue   ENT: negative for - oral lesions  Allergy and Immunology: negative   Hematological and Lymphatic:negative for - weight loss  Respiratory:  Baseline dyspnea at rest, with nasal cannula present but decreased compared to admission to LTAC.  Some dyspnea on exertion but able to walk across the room with assistance and oxygen supplementation   Cardiovascular: negative for - chest pain or palpitations  Gastrointestinal: positive for - appetite loss and diarrhea  Genito-Urinary: negative for - dysuria or hematuria  Musculoskeletal: positive for - generalized weakness  Neurological: negative for - confusion, dizziness or headaches  Dermatological: negative for - pruritus and rash    Objective     Vital Signs     No intake or output data in the 24 hours ending 10/31/24 0631    PHYSICAL EXAM:  General Appearance: Alert, cooperative, in no acute distress  Head: Normocephalic, without obvious abnormality, atraumatic  Eyes: Normal conjunctivae and sclerae, anicteric  Ears: Ears appear intact with no abnormalities noted, hearing grossly normal  Throat: No oral lesions, no thrush, oral mucosa moist  Neck: No adenopathy, supple, trachea midline, no JVD  Lungs: Scattered diffuse rales at bases, but without wheezing and with fairly clear breath sounds anteriorly   Heart: Regular rhythm and normal rate, no murmurs, gallops or rubs  Abdomen: Normal bowel sounds, no masses, no organomegaly, soft non-tender, non-distended  Genitalia: Deferred  Extremities: Moves all extremities equally well, no edema, no cyanosis, no redness  Skin: No bleeding, bruising or rash  Lymph nodes: No palpable adenopathy  Neurologic: Grossly intact though not formally tested        CBC  Results from last 7 days   Lab Units 10/31/24  0417 10/29/24  0358 10/28/24  0506   WBC 10*3/mm3 5.74 6.64 8.00  "  HEMOGLOBIN g/dL 11.1* 11.2* 11.6*   HEMATOCRIT % 38.3 38.7 40.4   PLATELETS 10*3/mm3 108* 102* 102*       CMP  Lab Results   Component Value Date    GLUCOSE 105 (H) 10/31/2024    BUN 24 (H) 10/31/2024    CREATININE 1.54 (H) 10/31/2024    EGFRIFNONA 29 (L) 08/27/2021    BCR 15.6 10/31/2024    CO2 26.0 10/31/2024    CALCIUM 9.5 10/31/2024    ALBUMIN 2.7 (L) 10/09/2024    AST 18 10/09/2024    ALT 24 10/09/2024             No results found for: \"BLOODCX\", \"URINECX\", \"WOUNDCX\", \"MRSACX\", \"RESPCX\", \"STOOLCX\"    Imaging Results (Last 72 Hours)       ** No results found for the last 72 hours. 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   CKD and chemotherapy associated ANEMIA, previously on Procrit, currently on hold having completed chemotherapy with further improvement in hemoglobin.  Pancreatic mass diagnosed 10/29/03 negative on open biopsy  BPH on Flomax  Orthostatic hypotension medications managed by Dr. Reuben Vásquez completed 4 cycles of combination chemotherapy with carboplatin, Keytruda, Alimta on 7/5/2019.    Maintenance Keytruda and Alimta every 3 weeks was delivered.    The final cycle number #25 of Keytruda/Alimta was delivered on 10/29/2020.     Treatment was held since that time due to issues of dyspnea and shortness of breath, requiring steroids.      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      Mr. Prater has had several hospitalizations and exacerbations of COPD and complications with pulmonary fibrosis in addition to the question of possible progressive disease including possible lymphangitic spread.       He is currently, 10/17/2024, on LTAC  I have been asked to see him for comment and opinion regarding recent imaging findings.  Below are findings and interactions I have had and monitoring Mr. Prater's history of lung cancer.     I discussed imaging with Dr Cool on 7/3/2024 regarding the June 2024 imaging.  Dr. Cool was gracious enough to review imaging studies with the following assessment by Dr. Danny Cool:  \"I have reviewed the CT scan of the thorax on this admission for atrial fibrillation with rapid ventricular response.     He completed recent SBRT radiotherapy for 2 right lung nodules on February 7, 2024.     Review of the current CT scan appears consistent with postradiation change.  We cannot entirely rule out progression however this appears unlikely with the chronology and radiographic appearance.     He is scheduled to return to our department in approximately 1 month with follow-up CT scan of the thorax and we will follow this closely with serial CT scans of the thorax.\"           CT scan of the chest without contrast on 9/21/2024 with addendum report is as follows:  Spiculated masslike opacity of the right upper lobe, similar to only minimally decreased compared to 8/3/2024, increased compared to 6/29/2024. Neoplastic process must be considered.   Stable scarring suspected of the right lung apex. Nonspecific irregular 2 x 1 cm nodular focus right middle lobe for which continued imaging surveillance recommended.   Advanced chronic interstitial lung disease with bronchiectasis.   No pneumothorax. Diffusely " irregular right pleural thickening remains stable.     This report was signed and finalized on 9/21/2024 1:13 PM by Dr. Betsey Wells MD.     ADDENDUM: Request from Dr. Michele's office for additional comparison  with the earlier imaging examinations, comparison is made with PET/CT  8/20/2024, CT chest without contrast 8/3/2024, CT chest without contrast  6/29/2024, CT chest without contrast 5/6/2024.     Measurements of the spiculated mass in the right upper lobe:     CT chest without contrast 5/6/2024: Approximately 4.0 x 2.2 cm     CT chest without contrast 6/29/202, Approximately 4.7 x 2.1 cm     CT chest without contrast 8/3/2024: Approximately 4.9 x 4.2 cm     PET/CT 8/20/2024: Approximately 3.6 x 1.8 cm.     CT chest without contrast 9/21/2024: Approximately 4.2 x 3.6 cm.     The mass was slowly increasing in size from May, 2024 through August 3,  2024, then it was smaller on the PET/CT from 8/20/2024, which could be a  positive treatment response. The mass then increased in size on  9/21/2024 and that measurement includes more confluent soft tissue  attenuation against the lateral pleural surface, which could be due to  increased volume of tumor tissue, or confluent surrounding infiltrate or  progressive fibrosis. A repeat PET/CT could help determine if the  increased size and volume of the mass is related to avid tumor or due to  an increase in surrounding infiltrate and fibrosis.     This report was signed and finalized on 10/7/2024 3:44 PM by Dr. Allen Churchill MD.             CTA chest at [facility] on 10/3/2024 reported the following:  Similar masslike spiculated opacity at the RIGHT upper lobe. Similar interlobular septal thickening with increased patchy opacities especially in the RIGHT lung. Appearance highly concerning for malignancy with lymphangitic spread of tumor.   Similar mild adenopathy.  Coronary artery calcifications with metallic device in the RIGHT  ventricular apex, favor wireless pulse  generator.  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             I agree with the radiologist's interpretation that the actual tumor mass within the radiation therapy field is similar to previous imaging.  I also agree that there are parenchymal interstitial changes that come and go, sometimes are worse sometimes are diminished with treatment.     In discussion with Dr. Gordon, Mr. Prater appears to be improving since on LTAC anticipating likely improvement in the imaging of the interstitial lung disease problems.    Dyspnea- stable CXR      RECOMMENDATIONS:    It has been ~1 month since his last CT chest.  Plan to repeat CT chest without contrast today, 10/31/2024 for comparison to better assess the possibility of lymphangitic spread?      CT scan of the chest 10/31/2024 at [facility] below:        The mass effect appears to be getting larger.  Per the radiologist, the interstitial markings are similar however in my opinion they may even also be getting worse.   Mr. Prater's pulmonary reserve is very marginal, likely as a result of these progressive changes.  Indeed now with these findings under maximum medical treatment, I am concerned that this could indeed represent progression of tumor without expectation of meaningful response even with systemic therapy.    Mr. Prater has not been in favor of further systemic therapy over the last couple years and indeed chances of complications would increase given his marginal pulmonary reserve.    Mr. Prater continues to have a positive outlook, looking forward to advancing in activity and exercise with physical therapy this week.  He really would like to be able to get home, however, he is aware that out of the current circumstances unless he improves, he does not know if he can make it at home by himself without further improvement.    I did not have these results this morning in rounds, in fact I put the request in for the above CT scan in rounds this  morning.    I will discuss these results with him in the morning.    Jeff Michele MD  10/31/24  06:31 CDT

## 2024-10-31 NOTE — PROGRESS NOTES
Formerly [facility]  ASHLEIGH Izquierdo M.D.  MARITZA Frias        Internal Medicine Progress Note    10/31/2024   11:23 CDT    Name:  Karoline Prater  MRN:    [id number]     Acct:     [id number]   Room:  1/[facility] Day: 0     Admit Date: 10/7/2024  4:53 PM  PCP: Irving Alexis MD    Subjective:     C/C: weakness, shortness of breath    Interval History: Status:  improved. Resting in bed. No family at bedside. Afebrile. Maintaining adequate 02 sats with 02 at 4 lpm, but reports increased congestion and shortness of breath. CT showed increased RUL consolidation. Has now developed 3rd degree heart block with rates in the 50s and no pacing spikes visible on telemetry per monitor room report. Counts stable. Had anticipated discharge today, but given decline, will hold and continued work up.   Review of Systems   Constitutional: Positive for malaise/fatigue. Negative for chills, decreased appetite, weight gain and weight loss.   HENT:  Negative for congestion, ear discharge, hoarse voice and tinnitus.    Eyes:  Negative for blurred vision, discharge, visual disturbance and visual halos.   Cardiovascular:  Positive for dyspnea on exertion. Negative for chest pain, claudication, irregular heartbeat, leg swelling, orthopnea and paroxysmal nocturnal dyspnea.   Respiratory:  Positive for shortness of breath. Negative for cough, sputum production and wheezing.    Endocrine: Negative for cold intolerance, heat intolerance and polyuria.   Hematologic/Lymphatic: Negative for adenopathy. Does not bruise/bleed easily.   Skin:  Negative for dry skin, itching and suspicious lesions.   Musculoskeletal:  Negative for arthritis, back pain, falls, joint pain, muscle weakness and myalgias.   Gastrointestinal:  Negative for abdominal pain, constipation, diarrhea, dysphagia and hematemesis.   Genitourinary:  Negative for bladder incontinence, dysuria and frequency.   Neurological:  Positive for weakness. Negative for aphonia,  disturbances in coordination and dizziness.   Psychiatric/Behavioral:  Negative for altered mental status, depression, memory loss and substance abuse. The patient does not have insomnia and is not nervous/anxious.          Medications:     Allergies:   Allergies   Allergen Reactions    Penicillins Hives       Current Meds:   Current Facility-Administered Medications:     acetaminophen (TYLENOL) tablet 1,000 mg, 1,000 mg, Oral, Nightly, Edmond Izquierdo MD    aspirin EC tablet 81 mg, 81 mg, Oral, Daily, Edmond Izquierdo MD    atorvastatin (LIPITOR) tablet 20 mg, 20 mg, Oral, Nightly, Edmond Izquierdo MD    bisacodyl (DULCOLAX) EC tablet 5 mg, 5 mg, Oral, Daily, Sil Jensen APRN    bisacodyl (DULCOLAX) suppository 10 mg, 10 mg, Rectal, Daily PRN, Edmond Izquierdo MD    budesonide (PULMICORT) nebulizer solution 0.5 mg, 0.5 mg, Nebulization, BID - RT, Tarik Crocker MD    busPIRone (BUSPAR) tablet 5 mg, 5 mg, Oral, TID With Meals, Edmond Izquierdo MD    diphenhydrAMINE (BENADRYL) capsule 25 mg, 25 mg, Oral, Nightly PRN, Edmond Izquierdo MD    empagliflozin (JARDIANCE) tablet 10 mg, 10 mg, Oral, Daily, Edmond Izquierdo MD    guaiFENesin (MUCINEX) 12 hr tablet 600 mg, 600 mg, Oral, Q12H, Edmond Izquierdo MD    HYDROcodone-acetaminophen (NORCO) 5-325 MG per tablet 1 tablet, 1 tablet, Oral, TID, Edmond Izquierdo MD    hydrocortisone (ANUSOL-HC) suppository 25 mg, 25 mg, Rectal, Q12H PRN, Edmond Izquierdo MD    ipratropium-albuterol (DUO-NEB) nebulizer solution 3 mL, 3 mL, Nebulization, 4x Daily - RT, Edmond Izquierdo MD    isosorbide mononitrate (IMDUR) 24 hr tablet 30 mg, 30 mg, Oral, Daily Before Lunch, Edmond Izquierdo MD    melatonin tablet 10 mg, 10 mg, Oral, Nightly, Edmond Izquierdo MD    metoprolol succinate XL (TOPROL-XL) 24 hr tablet 25 mg, 25 mg, Oral, Daily, Edmond Izquierdo MD    nitroglycerin (NITROSTAT) SL tablet  "0.4 mg, 0.4 mg, Sublingual, Q5 Min PRN, Edmond Izquierdo MD    nystatin (MYCOSTATIN) powder, , Topical, TID, Edmond Izquierdo MD    ondansetron ODT (ZOFRAN-ODT) disintegrating tablet 4 mg, 4 mg, Oral, Q6H PRN **OR** ondansetron (ZOFRAN) injection 4 mg, 4 mg, Intravenous, Q6H PRN, Edmond Izquierdo MD    pantoprazole (PROTONIX) EC tablet 40 mg, 40 mg, Oral, Daily, Edmond Izquierdo MD    polyethylene glycol (MIRALAX) packet 17 g, 17 g, Oral, Daily PRN, Edmond Izquierdo MD    predniSONE (DELTASONE) tablet 10 mg, 10 mg, Oral, Daily, Gareth Becerra MD    rivaroxaban (XARELTO) tablet 15 mg, 15 mg, Oral, Daily With Dinner, Edmond Izquierdo MD    sodium bicarbonate tablet 650 mg, 650 mg, Oral, TID, Edmond Izquierdo MD    sodium chloride 0.9 % bolus 250 mL, 250 mL, Intravenous, PRN, Edmond Izquierdo MD    sodium chloride nasal spray 2 spray, 2 spray, Each Nare, 5x Daily, Edmond Izquierdo MD    sodium chloride nasal spray 2 spray, 2 spray, Each Nare, Continuous PRN, Edmond Izquierdo MD    tamsulosin (FLOMAX) 24 hr capsule 0.4 mg, 0.4 mg, Oral, Nightly, Edmond Izquierdo MD    traZODone (DESYREL) tablet 25 mg, 25 mg, Oral, Nightly PRN, Edmond Izquierdo MD    Data:     Code Status:    There are no questions and answers to display.       Family History   Problem Relation Age of Onset    Hypertension Mother     Leukemia Father        Social History     Socioeconomic History    Marital status: Single   Tobacco Use    Smoking status: Former     Current packs/day: 0.00     Types: Cigarettes     Start date: 10/29/1954     Quit date: 10/29/2003     Years since quittin.0    Smokeless tobacco: Former     Types: Chew     Quit date:    Vaping Use    Vaping status: Never Used   Substance and Sexual Activity    Alcohol use: No    Drug use: No    Sexual activity: Defer       Vitals:  Ht 162.6 cm (64\")   Wt 71.4 kg (157 lb 8 oz)   BMI 27.03 kg/m²   T 98.0 P " 71 R 20  /77 Sp02 95% (4 lpm)          I/O (24Hr):  No intake or output data in the 24 hours ending 10/31/24 1123    Labs and imaging:      Recent Results (from the past 12 hours)   Basic Metabolic Panel    Collection Time: 10/31/24  4:17 AM    Specimen: Blood   Result Value Ref Range    Glucose 105 (H) 65 - 99 mg/dL    BUN 24 (H) 8 - 23 mg/dL    Creatinine 1.54 (H) 0.76 - 1.27 mg/dL    Sodium 140 136 - 145 mmol/L    Potassium 4.3 3.5 - 5.2 mmol/L    Chloride 106 98 - 107 mmol/L    CO2 26.0 22.0 - 29.0 mmol/L    Calcium 9.5 8.6 - 10.5 mg/dL    BUN/Creatinine Ratio 15.6 7.0 - 25.0    Anion Gap 8.0 5.0 - 15.0 mmol/L    eGFR 44.8 (L) >60.0 mL/min/1.73   CBC Auto Differential    Collection Time: 10/31/24  4:17 AM    Specimen: Blood   Result Value Ref Range    WBC 5.74 3.40 - 10.80 10*3/mm3    RBC 4.00 (L) 4.14 - 5.80 10*6/mm3    Hemoglobin 11.1 (L) 13.0 - 17.7 g/dL    Hematocrit 38.3 37.5 - 51.0 %    MCV 95.8 79.0 - 97.0 fL    MCH 27.8 26.6 - 33.0 pg    MCHC 29.0 (L) 31.5 - 35.7 g/dL    RDW 18.7 (H) 12.3 - 15.4 %    RDW-SD 66.3 (H) 37.0 - 54.0 fl    MPV 11.7 6.0 - 12.0 fL    Platelets 108 (L) 140 - 450 10*3/mm3    Neutrophil % 72.7 42.7 - 76.0 %    Lymphocyte % 11.3 (L) 19.6 - 45.3 %    Monocyte % 7.5 5.0 - 12.0 %    Eosinophil % 7.1 (H) 0.3 - 6.2 %    Basophil % 0.5 0.0 - 1.5 %    Immature Grans % 0.9 (H) 0.0 - 0.5 %    Neutrophils, Absolute 4.17 1.70 - 7.00 10*3/mm3    Lymphocytes, Absolute 0.65 (L) 0.70 - 3.10 10*3/mm3    Monocytes, Absolute 0.43 0.10 - 0.90 10*3/mm3    Eosinophils, Absolute 0.41 (H) 0.00 - 0.40 10*3/mm3    Basophils, Absolute 0.03 0.00 - 0.20 10*3/mm3    Immature Grans, Absolute 0.05 0.00 - 0.05 10*3/mm3    nRBC 0.0 0.0 - 0.2 /100 WBC                                           Physical Examination:        Physical Exam  Vitals and nursing note reviewed.   Constitutional:       Appearance: He is well-developed.   HENT:      Head: Normocephalic and atraumatic.      Nose: Nose normal.       Mouth/Throat:      Mouth: Mucous membranes are moist.      Pharynx: Oropharynx is clear.   Eyes:      Pupils: Pupils are equal, round, and reactive to light.      Comments: Left eye enucleated   Cardiovascular:      Rate and Rhythm: Normal rate and regular rhythm.      Heart sounds: Normal heart sounds.   Pulmonary:      Effort: Pulmonary effort is normal.      Breath sounds: Normal breath sounds.   Abdominal:      General: Bowel sounds are normal.      Palpations: Abdomen is soft.   Musculoskeletal:         General: Normal range of motion.      Cervical back: Normal range of motion and neck supple.      Comments: Generalized weakness   Skin:     General: Skin is warm and dry.   Neurological:      Mental Status: He is alert and oriented to person, place, and time.      Deep Tendon Reflexes: Reflexes are normal and symmetric.   Psychiatric:         Behavior: Behavior normal.           Assessment:             Stage 3b chronic kidney disease    A-fib    Former smoker    COPD (chronic obstructive pulmonary disease)    History of radiation therapy    Chronic heart failure with preserved ejection fraction (HFpEF)    Atrial flutter    COVID-19 virus infection    Acute-on-chronic respiratory failure    Epistaxis    Chronic rhinitis    History of pneumonia    Supplemental oxygen dependent    Past Medical History:   Diagnosis Date    Arthritis     Atrial fibrillation with rapid ventricular response 05/31/2019    Blindness of left eye     BPH with obstruction/lower urinary tract symptoms     Cancer     stomach & lung    CHF (congestive heart failure)     CKD (chronic kidney disease) stage 3, GFR 30-59 ml/min     COPD with acute exacerbation 08/03/2024    Coronary artery disease     Elevated cholesterol     Essential hypertension 10/02/2017    Fibrotic lung diseases     GERD (gastroesophageal reflux disease)     Hearing loss     History of transfusion     Hydronephrosis of left kidney     Hyperlipidemia     Hypertension     pt  was taken off of all of his medications for BP (atenolol, lisinopril, lasix) because his BP kept bottoming out so his primary dr told him to discontinue them 1-2 months ago (Jan/Feb 2019). pt states he takes no medications currently.    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 Patel is just going to keep an eye on it and not do surgery right now.    Mediastinal adenopathy 10/24/2018    Station 7    Monoclonal gammopathy of unknown significance (MGUS) 09/11/2018    Pancreatic mass     pt states he had this in 2013 but it went away on its own. Now recent CT shows it has come back so he is going to have an ultrasound on 3/13/19.    Shortness of breath         Plan:        Acute on chronic hypoxic respiratory failure  Stage 4 metastatic non small cell lung cancer  COPD  Interstitial lung disease  Chronic diastolic CHF  CKD3  Hyperkalemia  Multifactorial anemia  Anxiety  Hemorrhoids  Constipation    Continue current treatment. Monitor counts. Increase activity.Continue oxygen weaning as tolerated under direction of pulmonology. Aggressive therapies as tolerated. Maintain patient safety. Adjust bowel regimen as needed. Consult cardiology. Discharge planning.   Dr. Izquierdo discussed with Dr. Gordon who recommended metaneb with percussion vest three times daily.       Electronically signed by MARITZA Greenwood on 10/31/2024 at 11:23 CDT     I have discussed the care of Karoline Prater, including pertinent history and exam findings, with the nurse practitioner.    I have seen and examined the patient and the key elements of all parts of the encounter have been performed by me.  I agree with the assessment, plan and orders as documented by MARITZA Frias, after I modified the exam findings and the plan of treatments and the final version is my  approved version of the assessment.        Electronically signed by Edmond Izuqierdo MD on 10/31/2024 at 15:06 CDT

## 2024-11-01 PROCEDURE — 63710000001 PREDNISONE PER 5 MG: Performed by: INTERNAL MEDICINE

## 2024-11-01 NOTE — PROGRESS NOTES
"     Inpatient Progress Note        Results Review:   Results from last 7 days   Lab Units 10/31/24  0417 10/28/24  0506   SODIUM mmol/L 140 140   POTASSIUM mmol/L 4.3 4.4   CHLORIDE mmol/L 106 105   CO2 mmol/L 26.0 30.0*   BUN mg/dL 24* 25*   CALCIUM mg/dL 9.5 9.8     Results from last 7 days   Lab Units 10/31/24  0417 10/29/24  0358 10/28/24  0506   WBC 10*3/mm3 5.74 6.64 8.00   HEMOGLOBIN g/dL 11.1* 11.2* 11.6*   HEMATOCRIT % 38.3 38.7 40.4   PLATELETS 10*3/mm3 108* 102* 102*       Visit Vitals  Ht 162.6 cm (64\")   Wt 71.4 kg (157 lb 8 oz)   BMI 27.03 kg/m²            Medications:   acetaminophen, 1,000 mg, Oral, Nightly  acetylcysteine, 2 mL, Nebulization, BID - RT  aspirin, 81 mg, Oral, Daily  atorvastatin, 20 mg, Oral, Nightly  bisacodyl, 5 mg, Oral, Daily  budesonide, 0.5 mg, Nebulization, BID - RT  busPIRone, 5 mg, Oral, TID With Meals  empagliflozin, 10 mg, Oral, Daily  guaiFENesin, 600 mg, Oral, Q12H  HYDROcodone-acetaminophen, 1 tablet, Oral, TID  ipratropium-albuterol, 3 mL, Nebulization, 4x Daily - RT  isosorbide mononitrate, 30 mg, Oral, Daily Before Lunch  melatonin, 10 mg, Oral, Nightly  metoprolol succinate XL, 25 mg, Oral, Daily  nystatin, , Topical, TID  pantoprazole, 40 mg, Oral, Daily  predniSONE, 10 mg, Oral, Daily  rivaroxaban, 15 mg, Oral, Daily With Dinner  sodium bicarbonate, 650 mg, Oral, TID  sodium chloride, 2 spray, Each Nare, 5x Daily  tamsulosin, 0.4 mg, Oral, Nightly      sodium chloride, 2 spray                                             NAME: Karoline Prater  MRN: [id number]  AGE: 84 y.o.            : 1940  ADMISSION DATE: 10/7/2024  PRIMARY CARE PROVIDER: Irving Alexis MD  ATTENDING PHYSICIAN: Edmond Izquierdo MD                       Reason for Consult:  Acute hypoxic respiratory failure    Interval History:    No acute events overnight, patient resting in bed on 6 L.  He does have increased work of breathing today as he had just finished breakfast.  " He fluctuates between 3-4 L usually at rest.  He will require increased oxygen with any exertion.  He was seen by Dr. Michele today, note reviewed.  Plan is to discharge home with home health and follow-up.  Patient is greatly looking forward to returning home today.    Review of Systems:  A full 12-point review of systems was performed and was negative except as documented in the HPI.                       Physical Exam:  General: No acute distress, cooperative  Head: Atraumatic, normocephalic, normal inspection  Eyes: EOMI, normal inspection  ENT: Mucous membranes moist, no thrush  Teeth/gums: Normal inspection  Heart: RRR, no murmur  Lungs: Diminished bilaterally with coarse breath sounds  MSK: No edema or clubbing  GI/abdominal: Soft, nontender  Neurologic: Alert, oriented.  Cranial nerves II through XII grossly intact.           Imaging Review:   I personally reviewed the chest CT completed yesterday:  Chest CT shows significant worsening of the consolidations bilaterally especially in the right lung.  This is worrisome for progression of his cancer.                       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  Per oncology's note the patient appears to understand the severity and prognosis of his disease.  He would still like to return home with home health with plan for follow-up.  -He can continue supplemental oxygen at home as needed to maintain sat above 88%  -Please send home with incentive spirometer and flutter valve    I have reviewed his discharge med rec.  It would recommend the following for discharge:  -Continue albuterol 2.5 mg nebulized 4 times daily as needed  -Continue Breztri 2 puffs twice daily  -Continue  Zyrtec 10 mg by mouth daily  -Continue Flonase 1 spray each nostril twice daily  -Continue Mucinex 600 mg 1 tablet by mouth twice daily  -Continue prednisone 10 mg by mouth daily  -Continue hypertonic saline nebs 3 times daily as needed for difficulty expectorating mucus    -Discontinue home Symbicort  -Do not continue DuoNeb nebulized as he is on triple therapy with Breztri  -Do not continue Mucomyst  -Do not continue Pulmicort    He has a follow-up with Dr. Michele and Dr. Crocker scheduled.  At the pulmonology follow-up we can discuss nebulizing all his medications and possibly starting Ohtuvayre.  Thank you for allowing us to participate in this patient's care.  Pulmonology will sign off at this time, please feel free to contact us if we can be of further assistance.             Cornelio Gordon DO  Pulmonary/Critical Care  11/1/2024  10:14 CDT

## 2024-11-01 NOTE — PROGRESS NOTES
Inpatient Nutrition Services  Patient Name:  Karoline Prater  YOB: 1940  MRN: [id number]  Admit Date:  10/7/2024  Assessment Date:  11/1/2024   Reason for Assessment       Row Name 11/01/24 0948          Reason for Assessment    Reason For Assessment follow-up protocol     Diagnosis pulmonary disease;cardiac disease;cancer diagnosis/related complications;renal disease          Nutrition/Diet History       Row Name 11/01/24 0949          Nutrition/Diet History    Typical Intake (Food/Fluid/EN/PN) Regular diet and daily menu selections. Average PO 84% of meals and 1740mL oral fluid/d. BM yesterday. Dermatitis to buttocks/sacrum, no pressure injuries, no edema. Continues with nasal cannula. Wt 157.8lb. Admit 157.2lb. No discharge needs. Will continue current nutrition care plan and follow per protocol.           Electronically signed by:  Avelina Garnica RDN, LD  11/01/24 09:50 CDT

## 2024-11-01 NOTE — PROGRESS NOTES
"          Progress Note    Patient name: Karoline Prater  Patient : 1940  VISIT # [id number]  MR #[id number]  Room:     Subjective     Fahad says he continues to have a stuffy nose which is making his breathing more difficult.  He is oxygenating fairly well however.    Exam continues to have bilateral rales and crackles, relatively unchanged.    Fahad is a bit anxious and a little bit upset that he was not discharged yesterday.  He states he has good support at home with his brother, sister-in-law and other family members although he does live alone.  He is strongly desiring to be discharged to go home.    I explained the findings on the 10/31/2024 CT scan of the chest and that the changes likely represent progression of his primary lung cancer tumor.    Fahad responded \"you know that does not bother me, I am not worried about progression of the cancer, if I live or die, I know I am in the Lord's hands\".  Fahad again confirms that he does not desire to have further systemic therapy.  He definitely does not desire XRT, \"  I just want to go home\".   I discussed hospice with him, however, he states she does have home health that comes 3 times a week and that he has a support he needs and for the present does not desire to change over to hospice unless they are going to be able to come 3 or more times a week on a regular basis.    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   CKD and chemotherapy associated ANEMIA, previously on Procrit, currently on hold having completed chemotherapy with further improvement in " "hemoglobin.  Pancreatic mass diagnosed 10/29/03 negative on open biopsy  BPH on Flomax  Orthostatic hypotension medications managed by Dr. Alexis     Fahad completed 4 cycles of combination chemotherapy with carboplatin, Keytruda, Alimta on 7/5/2019.    Maintenance Keytruda and Alimta every 3 weeks was delivered.    The final cycle number #25 of Keytruda/Alimta was delivered on 10/29/2020.     Treatment was held since that time due to issues of dyspnea and shortness of breath, requiring steroids.      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      Mr. Prater has had several hospitalizations and exacerbations of COPD and complications with pulmonary fibrosis in addition to the question of possible progressive disease including possible lymphangitic spread.       He is currently, 10/17/2024, on LTAC  I have been asked to see him for comment and opinion regarding recent imaging findings.  Below are findings and interactions I have had and monitoring Mr. Prater's history of lung cancer.     I discussed imaging with Dr Cool on 7/3/2024 regarding the June 2024 imaging.  Dr. Cool was gracious enough to review imaging studies with the following assessment by Dr. Danny Cool:  \"I have reviewed the CT scan of the thorax on this admission for atrial fibrillation with rapid ventricular response.     He completed recent SBRT radiotherapy for 2 right lung nodules on February 7, 2024.     Review of the current CT scan appears consistent with postradiation change.  We cannot entirely rule out progression however this appears unlikely with the chronology and radiographic appearance.     He is scheduled to return to our department in approximately 1 month with follow-up CT scan of the thorax and we will follow this closely with serial CT scans of the thorax.\"      "      CT scan of the chest without contrast on 9/21/2024 with addendum report is as follows:  Spiculated masslike opacity of the right upper lobe, similar to only minimally decreased compared to 8/3/2024, increased compared to 6/29/2024. Neoplastic process must be considered.   Stable scarring suspected of the right lung apex. Nonspecific irregular 2 x 1 cm nodular focus right middle lobe for which continued imaging surveillance recommended.   Advanced chronic interstitial lung disease with bronchiectasis.   No pneumothorax. Diffusely irregular right pleural thickening remains stable.     This report was signed and finalized on 9/21/2024 1:13 PM by Dr. Betsey Wells MD.     ADDENDUM: Request from Dr. Michele's office for additional comparison  with the earlier imaging examinations, comparison is made with PET/CT  8/20/2024, CT chest without contrast 8/3/2024, CT chest without contrast  6/29/2024, CT chest without contrast 5/6/2024.     Measurements of the spiculated mass in the right upper lobe:     CT chest without contrast 5/6/2024: Approximately 4.0 x 2.2 cm     CT chest without contrast 6/29/202, Approximately 4.7 x 2.1 cm     CT chest without contrast 8/3/2024: Approximately 4.9 x 4.2 cm     PET/CT 8/20/2024: Approximately 3.6 x 1.8 cm.     CT chest without contrast 9/21/2024: Approximately 4.2 x 3.6 cm.     The mass was slowly increasing in size from May, 2024 through August 3,  2024, then it was smaller on the PET/CT from 8/20/2024, which could be a  positive treatment response. The mass then increased in size on  9/21/2024 and that measurement includes more confluent soft tissue  attenuation against the lateral pleural surface, which could be due to  increased volume of tumor tissue, or confluent surrounding infiltrate or  progressive fibrosis. A repeat PET/CT could help determine if the  increased size and volume of the mass is related to avid tumor or due to  an increase in surrounding infiltrate and  fibrosis.     This report was signed and finalized on 10/7/2024 3:44 PM by Dr. Allen Churchill MD.             [facility] at [facility] on 10[facility]3/2024 reported the following:  Similar masslike spiculated opacity at the RIGHT upper lobe. Similar interlobular septal thickening with increased patchy opacities especially in the RIGHT lung. Appearance highly concerning for malignancy with lymphangitic spread of tumor.   Similar mild adenopathy.  Coronary artery calcifications with metallic device in the RIGHT  ventricular apex, favor wireless pulse generator.  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             I agree with the radiologist's interpretation that the actual tumor mass within the radiation therapy field is similar to previous imaging.  I also agree that there are parenchymal interstitial changes that come and go, sometimes are worse sometimes are diminished with treatment.     In discussion with Dr. Gordon, Mr. Prater appears to be improving since on LTAC anticipating likely improvement in the imaging of the interstitial lung disease problems.        REVIEW OF SYSTEMS:   History obtained from chart review and the patient  General: positive for  - fatigue   ENT: negative for - oral lesions  Allergy and Immunology: negative   Hematological and Lymphatic:negative for - weight loss  Respiratory:  Baseline dyspnea at rest, with nasal cannula present but decreased compared to admission to LTAC.  Some dyspnea on exertion but able to walk across the room with assistance and oxygen supplementation   Cardiovascular: negative for - chest pain or palpitations  Gastrointestinal: positive for - appetite loss and diarrhea  Genito-Urinary: negative for - dysuria or hematuria  Musculoskeletal: positive for - generalized weakness  Neurological: negative for - confusion, dizziness or headaches  Dermatological: negative for - pruritus and rash    Objective     Vital Signs     No intake or output data in the  "24 hours ending 11/01/24 0611    PHYSICAL EXAM:  General Appearance: Alert, cooperative, in no acute distress  Head: Normocephalic, without obvious abnormality, atraumatic  Eyes: Normal conjunctivae and sclerae, anicteric  Ears: Ears appear intact with no abnormalities noted, hearing grossly normal  Throat: No oral lesions, no thrush, oral mucosa moist  Neck: No adenopathy, supple, trachea midline, no JVD  Lungs: Scattered diffuse rales at bases, but without wheezing and with fairly clear breath sounds anteriorly   Heart: Regular rhythm and normal rate, no murmurs, gallops or rubs  Abdomen: Normal bowel sounds, no masses, no organomegaly, soft non-tender, non-distended  Genitalia: Deferred  Extremities: Moves all extremities equally well, no edema, no cyanosis, no redness  Skin: No bleeding, bruising or rash  Lymph nodes: No palpable adenopathy  Neurologic: Grossly intact though not formally tested        CBC  Results from last 7 days   Lab Units 10/31/24  0417 10/29/24  0358 10/28/24  0506   WBC 10*3/mm3 5.74 6.64 8.00   HEMOGLOBIN g/dL 11.1* 11.2* 11.6*   HEMATOCRIT % 38.3 38.7 40.4   PLATELETS 10*3/mm3 108* 102* 102*       CMP  Lab Results   Component Value Date    GLUCOSE 105 (H) 10/31/2024    BUN 24 (H) 10/31/2024    CREATININE 1.54 (H) 10/31/2024    EGFRIFNONA 29 (L) 08/27/2021    BCR 15.6 10/31/2024    CO2 26.0 10/31/2024    CALCIUM 9.5 10/31/2024    ALBUMIN 2.7 (L) 10/09/2024    AST 18 10/09/2024    ALT 24 10/09/2024             No results found for: \"BLOODCX\", \"URINECX\", \"WOUNDCX\", \"MRSACX\", \"RESPCX\", \"STOOLCX\"    Imaging Results (Last 72 Hours)       Procedure Component Value Units Date/Time    CT Chest Without Contrast Diagnostic [[id number]] Collected: 10/31/24 0924     Updated: 10/31/24 0932    Narrative:      EXAMINATION: CT CHEST WO CONTRAST DIAGNOSTIC-      10/31/2024 7:48 AM     HISTORY: pneumonia     In order to have a CT radiation dose as low as reasonably achievable  Automated Exposure Control was " utilized for adjustment of the mA and/or  KV according to patient size.     CT Dose DLP = 115.16 mGy.cm.  (If there are multiple studies performed at the same time this  represents the total dose).     Noncontrast chest CT.  Axial, sagittal, and coronal sequences.     COMPARISON:  10/3/2024.     Borderline cardiomegaly is stable.  Coronary artery calcification.  Thoracic aortic calcification with no aneurysm.  No mediastinal soft tissue mass.  A few partially calcified mediastinal lymph nodes are present.  Unchanged scattered enlarged posterior mediastinal lymph nodes which lie  posterior to the mid and distal esophagus. Past history of lung cancer  noted.     Severe chronic change with diffuse interstitial prominence and patchy  infiltrate.     Focal consolidation within the lateral RIGHT upper lobe (centered on  axial image 59) is more prominent now involving an area measuring 8.5 x  5 cm compared with 4.5 x 3.6 cm.   Increased area of patchy consolidation within the posterior RIGHT lower  lobe centered on axial image 73.     No pneumothorax.     Moderate degenerative disc and endplate change throughout the thoracic  spine.  Moderate thoracic kyphosis.       Impression:      1. Progression of areas of consolidation within the RIGHT lung.  2. Prominent diffuse interstitial disease has a similar appearance.     This report was signed and finalized on 10/31/2024 9:29 AM by Dr. Danilo Sebastian MD.                 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   CKD and chemotherapy associated ANEMIA, previously on Procrit, currently on hold having completed chemotherapy with further improvement in hemoglobin.  Pancreatic mass diagnosed 10/29/03 negative on open biopsy  BPH on Flomax  Orthostatic hypotension medications managed by Dr. Alexis     Fahad completed 4 cycles of combination chemotherapy with carboplatin, Keytruda, Alimta on 7/5/2019.    Maintenance Keytruda and Alimta every 3 weeks was delivered.    The final cycle number #25 of Keytruda/Alimta was delivered on 10/29/2020.     Treatment was held since that time due to issues of dyspnea and shortness of breath, requiring steroids.      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      Mr. Prater has had several hospitalizations and exacerbations of COPD and complications with pulmonary fibrosis in addition to the question of possible progressive disease including possible lymphangitic spread.       He is currently, 10/17/2024, on LTAC  I have been asked to see him for comment and opinion regarding recent imaging findings.  Below are findings and interactions I have had and monitoring Mr. Prater's history of lung cancer.     I discussed imaging with Dr Cool on 7/3/2024 regarding the June 2024 imaging.  Dr. Cool was gracious enough to review imaging studies with the following assessment by Dr. Danny Cool:  \"I have reviewed the CT scan of the thorax on this admission for atrial fibrillation with rapid ventricular response.     He completed recent SBRT radiotherapy for 2 right lung nodules on February 7, 2024.     Review of the current CT scan appears consistent with postradiation change.  We cannot entirely " "rule out progression however this appears unlikely with the chronology and radiographic appearance.     He is scheduled to return to our department in approximately 1 month with follow-up CT scan of the thorax and we will follow this closely with serial CT scans of the thorax.\"           CT scan of the chest without contrast on 9/21/2024 with addendum report is as follows:  Spiculated masslike opacity of the right upper lobe, similar to only minimally decreased compared to 8/3/2024, increased compared to 6/29/2024. Neoplastic process must be considered.   Stable scarring suspected of the right lung apex. Nonspecific irregular 2 x 1 cm nodular focus right middle lobe for which continued imaging surveillance recommended.   Advanced chronic interstitial lung disease with bronchiectasis.   No pneumothorax. Diffusely irregular right pleural thickening remains stable.     This report was signed and finalized on 9/21/2024 1:13 PM by Dr. Betsey Wells MD.     ADDENDUM: Request from Dr. Michele's office for additional comparison  with the earlier imaging examinations, comparison is made with PET/CT  8/20/2024, CT chest without contrast 8/3/2024, CT chest without contrast  6/29/2024, CT chest without contrast 5/6/2024.     Measurements of the spiculated mass in the right upper lobe:     CT chest without contrast 5/6/2024: Approximately 4.0 x 2.2 cm     CT chest without contrast 6/29/202, Approximately 4.7 x 2.1 cm     CT chest without contrast 8/3/2024: Approximately 4.9 x 4.2 cm     PET/CT 8/20/2024: Approximately 3.6 x 1.8 cm.     CT chest without contrast 9/21/2024: Approximately 4.2 x 3.6 cm.     The mass was slowly increasing in size from May, 2024 through August 3,  2024, then it was smaller on the PET/CT from 8/20/2024, which could be a  positive treatment response. The mass then increased in size on  9/21/2024 and that measurement includes more confluent soft tissue  attenuation against the lateral pleural surface, " which could be due to  increased volume of tumor tissue, or confluent surrounding infiltrate or  progressive fibrosis. A repeat PET/CT could help determine if the  increased size and volume of the mass is related to avid tumor or due to  an increase in surrounding infiltrate and fibrosis.     This report was signed and finalized on 10/7/2024 3:44 PM by Dr. Allen Churchill MD.             CTA chest at [facility] on 10/3/2024 reported the following:  Similar masslike spiculated opacity at the RIGHT upper lobe. Similar interlobular septal thickening with increased patchy opacities especially in the RIGHT lung. Appearance highly concerning for malignancy with lymphangitic spread of tumor.   Similar mild adenopathy.  Coronary artery calcifications with metallic device in the RIGHT  ventricular apex, favor wireless pulse generator.  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             I agree with the radiologist's interpretation that the actual tumor mass within the radiation therapy field is similar to previous imaging.  I also agree that there are parenchymal interstitial changes that come and go, sometimes are worse sometimes are diminished with treatment.     In discussion with Dr. Gordon, Mr. Prater appears to be improving since on LTAC anticipating likely improvement in the imaging of the interstitial lung disease problems.    Dyspnea- stable CXR      RECOMMENDATIONS:    It has been ~1 month since his last CT chest.  Plan to repeat CT chest without contrast today, 10/31/2024 for comparison to better assess the possibility of lymphangitic spread?      CT scan of the chest 10/31/2024 at [facility] below:        The mass effect appears to be getting larger.  Per the radiologist, the interstitial markings are similar however in my opinion they may even also be getting worse.   Mr. Prater's pulmonary reserve is very marginal, likely as a result of these progressive changes.  Indeed now with these findings  "under maximum medical treatment, I am concerned that this could indeed represent progression of tumor without expectation of meaningful response even with systemic therapy.    Mr. Prater has not been in favor of further systemic therapy over the last couple years and indeed chances of complications would increase given his marginal pulmonary reserve.    Mr. Prater continues to have a positive outlook, looking forward to advancing in activity and exercise with physical therapy this week.  He really would like to be able to get home, however, he is aware that out of the current circumstances unless he improves, he does not know if he can make it at home by himself without further improvement.    I explained the findings on the 10/31/2024 CT scan of the chest and that the changes likely represent progression of his primary lung cancer tumor.    Fahad responded \"you know that does not bother me, I am not worried about progression of the cancer, if I live or die, I know I am in the Lord's hands\".  Fahad again confirms that he does not desire to have further systemic therapy.  He definitely does not desire XRT, \"  I just want to go home\".   I discussed hospice with him, however, he states she does have home health that comes 3 times a week and that he has a support he needs and for the present does not desire to change over to hospice unless they are going to be able to come 3 or more times a week on a regular basis.    When asked about follow-up, he said he would try to come see me in a couple of months \"if I am still alive at that time\"    An appointment has been made for him to see me 1/8/2025 at 10:30 AM    Jeff Michele MD  11/01/24  06:11 CDT        "

## 2024-11-12 PROBLEM — I50.9 CHF (CONGESTIVE HEART FAILURE) (HCC): Status: ACTIVE | Noted: 2024-08-05

## 2024-11-12 PROBLEM — I27.20 MILD PULMONARY HYPERTENSION (HCC): Status: ACTIVE | Noted: 2024-08-13

## 2024-11-12 PROBLEM — E78.5 HYPERLIPIDEMIA: Status: ACTIVE | Noted: 2024-11-12

## 2024-11-12 PROBLEM — D69.6 THROMBOCYTOPENIA (HCC): Status: ACTIVE | Noted: 2020-07-23

## 2024-11-12 PROBLEM — N18.4 STAGE 4 CHRONIC KIDNEY DISEASE (HCC): Status: ACTIVE | Noted: 2020-02-11

## 2024-11-12 PROBLEM — J15.9 BACTERIAL PNEUMONIA: Status: ACTIVE | Noted: 2024-09-08

## 2024-11-12 PROBLEM — I50.9 CHF EXACERBATION (HCC): Status: ACTIVE | Noted: 2024-09-05

## 2024-11-12 PROBLEM — J44.9 COPD (CHRONIC OBSTRUCTIVE PULMONARY DISEASE) (HCC): Status: ACTIVE | Noted: 2018-09-11

## 2024-11-12 PROBLEM — I50.32 CHRONIC DIASTOLIC CONGESTIVE HEART FAILURE (HCC): Status: ACTIVE | Noted: 2024-08-03

## 2024-11-12 PROBLEM — R91.8 LUNG MASS: Status: ACTIVE | Noted: 2018-10-09

## 2024-11-12 PROBLEM — I25.2 HISTORY OF MYOCARDIAL INFARCTION: Status: ACTIVE | Noted: 2023-01-17

## 2024-11-12 PROBLEM — R00.0 TACHYCARDIA: Status: ACTIVE | Noted: 2024-07-03

## 2024-11-12 PROBLEM — R60.0 BILATERAL LOWER EXTREMITY EDEMA: Status: ACTIVE | Noted: 2020-02-11

## 2024-11-12 PROBLEM — D53.9 MACROCYTIC ANEMIA: Status: ACTIVE | Noted: 2018-10-09

## 2024-11-12 PROBLEM — T45.1X5A PANCYTOPENIA DUE TO ANTINEOPLASTIC CHEMOTHERAPY (HCC): Status: ACTIVE | Noted: 2019-04-28

## 2024-11-12 PROBLEM — L03.90 CELLULITIS: Status: ACTIVE | Noted: 2020-07-23

## 2024-11-12 PROBLEM — R04.0 EPISTAXIS: Status: ACTIVE | Noted: 2024-09-30

## 2024-11-12 PROBLEM — D47.2 MONOCLONAL GAMMOPATHY OF UNKNOWN SIGNIFICANCE (MGUS): Status: ACTIVE | Noted: 2018-09-11

## 2024-11-12 PROBLEM — C34.90 ADENOCARCINOMA OF LUNG (HCC): Status: ACTIVE | Noted: 2020-02-11

## 2024-11-12 PROBLEM — N17.9 ACUTE KIDNEY INJURY SUPERIMPOSED ON CKD (HCC): Status: ACTIVE | Noted: 2021-08-25

## 2024-11-12 PROBLEM — I50.32 CHRONIC HEART FAILURE WITH PRESERVED EJECTION FRACTION (HFPEF) (HCC): Status: ACTIVE | Noted: 2024-08-08

## 2024-11-12 PROBLEM — I47.19 NARROW COMPLEX TACHYCARDIA (HCC): Status: ACTIVE | Noted: 2024-08-27

## 2024-11-12 PROBLEM — I48.92 ATRIAL FLUTTER (HCC): Status: ACTIVE | Noted: 2024-08-26

## 2024-11-12 PROBLEM — R06.00 DYSPNEA: Status: ACTIVE | Noted: 2018-09-11

## 2024-11-12 PROBLEM — N28.9 KIDNEY DYSFUNCTION: Status: ACTIVE | Noted: 2020-07-23

## 2024-11-12 PROBLEM — I48.91 A-FIB (HCC): Status: ACTIVE | Noted: 2023-01-10

## 2024-11-12 PROBLEM — U07.1 COVID-19 VIRUS INFECTION: Status: ACTIVE | Noted: 2024-09-05

## 2024-11-12 PROBLEM — A41.9 SEPSIS (HCC): Status: ACTIVE | Noted: 2020-07-23

## 2024-11-12 PROBLEM — R07.9 CHEST PAIN: Status: ACTIVE | Noted: 2024-06-29

## 2024-11-12 PROBLEM — C25.4: Status: ACTIVE | Noted: 2020-03-10

## 2024-11-12 PROBLEM — D89.832 CYTOKINE RELEASE SYNDROME, GRADE 2: Status: ACTIVE | Noted: 2024-09-08

## 2024-11-12 PROBLEM — E83.52 HYPERCALCEMIA: Status: ACTIVE | Noted: 2018-09-11

## 2024-11-12 PROBLEM — C79.9: Status: ACTIVE | Noted: 2020-03-10

## 2024-11-12 PROBLEM — E66.3 OVERWEIGHT WITH BODY MASS INDEX (BMI) 25.0-29.9: Status: ACTIVE | Noted: 2019-06-18

## 2024-11-12 PROBLEM — J84.10 PULMONARY FIBROSIS (HCC): Status: ACTIVE | Noted: 2020-07-23

## 2024-11-12 PROBLEM — C34.11 MALIGNANT NEOPLASM OF UPPER LOBE OF RIGHT LUNG (HCC): Status: ACTIVE | Noted: 2018-10-02

## 2024-11-12 PROBLEM — N18.9 ACUTE KIDNEY INJURY SUPERIMPOSED ON CKD (HCC): Status: ACTIVE | Noted: 2021-08-25

## 2024-11-12 PROBLEM — I25.10 CORONARY ATHEROSCLEROSIS: Status: ACTIVE | Noted: 2024-11-12

## 2024-11-12 PROBLEM — K21.9 GERD WITHOUT ESOPHAGITIS: Status: ACTIVE | Noted: 2023-01-10

## 2024-11-12 PROBLEM — Z87.01 HISTORY OF PNEUMONIA: Status: ACTIVE | Noted: 2024-10-08

## 2024-11-12 PROBLEM — Z92.3 S/P RADIATION > 12 WEEKS: Status: ACTIVE | Noted: 2024-08-03

## 2024-11-12 PROBLEM — I10 HYPERTENSIVE DISORDER: Status: ACTIVE | Noted: 2018-08-28

## 2024-11-12 PROBLEM — C79.9 METASTATIC ADENOCARCINOMA (HCC): Status: ACTIVE | Noted: 2024-08-03

## 2024-11-12 PROBLEM — E87.5 HYPERKALEMIA: Status: ACTIVE | Noted: 2021-08-25

## 2024-11-12 PROBLEM — J93.9 PNEUMOTHORAX: Status: ACTIVE | Noted: 2021-08-25

## 2024-11-12 PROBLEM — J31.0 CHRONIC RHINITIS: Status: ACTIVE | Noted: 2024-09-30

## 2024-11-12 PROBLEM — Z99.81 SUPPLEMENTAL OXYGEN DEPENDENT: Status: ACTIVE | Noted: 2024-10-08

## 2024-11-12 PROBLEM — J96.20 ACUTE ON CHRONIC RESPIRATORY FAILURE: Status: ACTIVE | Noted: 2020-07-23

## 2024-11-12 PROBLEM — N18.32 STAGE 3B CHRONIC KIDNEY DISEASE (HCC): Status: ACTIVE | Noted: 2019-04-27

## 2024-11-12 PROBLEM — D61.810 PANCYTOPENIA DUE TO ANTINEOPLASTIC CHEMOTHERAPY (HCC): Status: ACTIVE | Noted: 2019-04-28

## 2024-11-12 PROBLEM — J98.4 PNEUMONITIS: Status: ACTIVE | Noted: 2024-09-19

## 2024-11-12 PROBLEM — R10.9 ABDOMINAL PAIN: Status: ACTIVE | Noted: 2018-09-11

## 2024-11-20 ENCOUNTER — HOSPITAL ENCOUNTER (INPATIENT)
Age: 84
LOS: 5 days | Discharge: HOSPICE | DRG: 951 | End: 2024-11-25
Attending: INTERNAL MEDICINE | Admitting: INTERNAL MEDICINE
Payer: MEDICARE

## 2024-11-20 PROBLEM — C34.90 MALIGNANT NEOPLASM OF BRONCHUS AND LUNG (HCC): Status: ACTIVE | Noted: 2024-11-20

## 2024-11-20 LAB — SARS-COV-2 RDRP RESP QL NAA+PROBE: NOT DETECTED

## 2024-11-20 PROCEDURE — 6370000000 HC RX 637 (ALT 250 FOR IP): Performed by: INTERNAL MEDICINE

## 2024-11-20 PROCEDURE — 2700000000 HC OXYGEN THERAPY PER DAY

## 2024-11-20 PROCEDURE — 6360000002 HC RX W HCPCS: Performed by: INTERNAL MEDICINE

## 2024-11-20 PROCEDURE — 87635 SARS-COV-2 COVID-19 AMP PRB: CPT

## 2024-11-20 PROCEDURE — 6560000002 HC HOSPICE GENERAL INPATIENT

## 2024-11-20 PROCEDURE — 94640 AIRWAY INHALATION TREATMENT: CPT

## 2024-11-20 RX ORDER — BUDESONIDE 0.5 MG/2ML
0.5 INHALANT ORAL
Status: DISCONTINUED | OUTPATIENT
Start: 2024-11-20 | End: 2024-11-25 | Stop reason: HOSPADM

## 2024-11-20 RX ORDER — ACETAMINOPHEN 325 MG/1
650 TABLET ORAL EVERY 4 HOURS PRN
Status: DISCONTINUED | OUTPATIENT
Start: 2024-11-20 | End: 2024-11-25 | Stop reason: HOSPADM

## 2024-11-20 RX ORDER — HALOPERIDOL 2 MG/ML
1 SOLUTION ORAL EVERY 6 HOURS PRN
Status: DISCONTINUED | OUTPATIENT
Start: 2024-11-20 | End: 2024-11-25 | Stop reason: HOSPADM

## 2024-11-20 RX ORDER — DOXYCYCLINE 100 MG/1
100 CAPSULE ORAL EVERY 12 HOURS SCHEDULED
Status: COMPLETED | OUTPATIENT
Start: 2024-11-20 | End: 2024-11-21

## 2024-11-20 RX ORDER — PROCHLORPERAZINE MALEATE 10 MG
10 TABLET ORAL EVERY 6 HOURS PRN
Status: DISCONTINUED | OUTPATIENT
Start: 2024-11-20 | End: 2024-11-25 | Stop reason: HOSPADM

## 2024-11-20 RX ORDER — IPRATROPIUM BROMIDE AND ALBUTEROL SULFATE 2.5; .5 MG/3ML; MG/3ML
1 SOLUTION RESPIRATORY (INHALATION)
Status: DISCONTINUED | OUTPATIENT
Start: 2024-11-20 | End: 2024-11-25 | Stop reason: HOSPADM

## 2024-11-20 RX ORDER — SODIUM BICARBONATE 650 MG/1
650 TABLET ORAL 3 TIMES DAILY
Status: DISCONTINUED | OUTPATIENT
Start: 2024-11-20 | End: 2024-11-25 | Stop reason: HOSPADM

## 2024-11-20 RX ORDER — LORAZEPAM 0.5 MG/1
0.5 TABLET ORAL EVERY 6 HOURS PRN
Status: DISCONTINUED | OUTPATIENT
Start: 2024-11-20 | End: 2024-11-21

## 2024-11-20 RX ORDER — PREDNISONE 10 MG/1
40 TABLET ORAL DAILY
Status: DISCONTINUED | OUTPATIENT
Start: 2024-11-21 | End: 2024-11-21

## 2024-11-20 RX ORDER — PREDNISONE 10 MG/1
10 TABLET ORAL DAILY
Status: DISCONTINUED | OUTPATIENT
Start: 2024-11-21 | End: 2024-11-25 | Stop reason: HOSPADM

## 2024-11-20 RX ORDER — ACETAMINOPHEN 650 MG/1
650 SUPPOSITORY RECTAL EVERY 4 HOURS PRN
Status: DISCONTINUED | OUTPATIENT
Start: 2024-11-20 | End: 2024-11-25 | Stop reason: HOSPADM

## 2024-11-20 RX ORDER — TAMSULOSIN HYDROCHLORIDE 0.4 MG/1
0.4 CAPSULE ORAL
Status: DISCONTINUED | OUTPATIENT
Start: 2024-11-20 | End: 2024-11-25 | Stop reason: HOSPADM

## 2024-11-20 RX ORDER — BUSPIRONE HYDROCHLORIDE 10 MG/1
10 TABLET ORAL 3 TIMES DAILY
Status: DISCONTINUED | OUTPATIENT
Start: 2024-11-20 | End: 2024-11-25 | Stop reason: HOSPADM

## 2024-11-20 RX ORDER — METOPROLOL SUCCINATE 50 MG/1
25 TABLET, EXTENDED RELEASE ORAL DAILY
Status: DISCONTINUED | OUTPATIENT
Start: 2024-11-21 | End: 2024-11-25 | Stop reason: HOSPADM

## 2024-11-20 RX ORDER — BISACODYL 10 MG
10 SUPPOSITORY, RECTAL RECTAL DAILY PRN
Status: DISCONTINUED | OUTPATIENT
Start: 2024-11-20 | End: 2024-11-25 | Stop reason: HOSPADM

## 2024-11-20 RX ORDER — GUAIFENESIN 600 MG/1
600 TABLET, EXTENDED RELEASE ORAL 2 TIMES DAILY
Status: DISCONTINUED | OUTPATIENT
Start: 2024-11-20 | End: 2024-11-25 | Stop reason: HOSPADM

## 2024-11-20 RX ORDER — MORPHINE SULFATE 20 MG/ML
5 SOLUTION ORAL
Status: DISCONTINUED | OUTPATIENT
Start: 2024-11-20 | End: 2024-11-25 | Stop reason: HOSPADM

## 2024-11-20 RX ADMIN — BUDESONIDE 500 MCG: 0.5 SUSPENSION RESPIRATORY (INHALATION) at 18:40

## 2024-11-20 RX ADMIN — SODIUM BICARBONATE 650 MG: 650 TABLET ORAL at 21:49

## 2024-11-20 RX ADMIN — DOXYCYCLINE HYCLATE 100 MG: 100 CAPSULE ORAL at 21:49

## 2024-11-20 RX ADMIN — GUAIFENESIN 600 MG: 600 TABLET, EXTENDED RELEASE ORAL at 21:49

## 2024-11-20 RX ADMIN — Medication 5 MG: at 23:30

## 2024-11-20 RX ADMIN — BUSPIRONE HYDROCHLORIDE 10 MG: 10 TABLET ORAL at 21:49

## 2024-11-20 RX ADMIN — TAMSULOSIN HYDROCHLORIDE 0.4 MG: 0.4 CAPSULE ORAL at 21:49

## 2024-11-20 RX ADMIN — IPRATROPIUM BROMIDE AND ALBUTEROL SULFATE 1 DOSE: 2.5; .5 SOLUTION RESPIRATORY (INHALATION) at 18:41

## 2024-11-20 NOTE — PROGRESS NOTES
Patient arrived to the [facility] from home via EMS for a respite stay. Patient is alert and orientated x 3. Patient's diagnoses are malignant neoplasm of unspecified part of unspecified bronchus or lung, malignant neoplasm of endocrine pancreas and other comorbidities. Patient is on a regular diet and is continent of urine and bowel. Patient has a sacral wound that is NHUNG. Patient is on oxygen at 6L. Vital signs obtained.

## 2024-11-20 NOTE — PROGRESS NOTES
HARRY was informed that Prabhjot Horner was coming for an emergency respite stay as he was home and was bedbound and unable to care for himself.  It was told to the [facility] nurses that he was coming for NHP.    He is respite LOC and his discharge day will be Monday.    Prabhjot is sleeping when I entered but woke easily to my present.  He is alert and oriented and able to make his needs and wishes known.    Prabhjot tells me a bit of his hx.  He is  and lives alone.  He was in rehab at  for some time and came home a few weeks ago and was subsequently admitted to Hospice with [facility].  He was bedbound at that time.  He has a brother, Hai and a sister in law (Val) that check on him.  He also has a neighbor.  Prabhjot does not seem very realistic about his prognosis and tells me that his sinuses are an issue but he does not worry much about his cancer.  He tells me it will \"take care of itself\".  I explained to him the safety concern in being alone at home and he tells me he is not going to go to a NH.  He is adamant that he will die in his home and states he is somewhat regretful for having agreed to come to [facility].  He did agree for me to talk to his brother.    I called Hai (Brother) for further insight.  Hai explained that Prabhjot has a son, Xiang, who is a  in [place].  He is supportive but at a distance.  Hai states that he plans to call Xiang manuel to update him.    Hai tells me that he is 83 yo and is not able to be with Prabhjot all the time.  He states that he goes to Prabhjot' home 3 times a day to take him meals.  He states that Prabhjot uses a urinal and that he has a neighbor Abran that will help him to the Bed side commode when needed.  Hai states that he feels he should go to a nursing home for care.  Prabhjot had told me that he could not afford paid care as he only receives social security.  Hai is coming to the [facility] tomorrow between 11 and noon and we plan to discuss all of the above with Prabhjot

## 2024-11-21 PROCEDURE — 6360000002 HC RX W HCPCS: Performed by: INTERNAL MEDICINE

## 2024-11-21 PROCEDURE — 2700000000 HC OXYGEN THERAPY PER DAY

## 2024-11-21 PROCEDURE — 6560000002 HC HOSPICE GENERAL INPATIENT

## 2024-11-21 PROCEDURE — 94640 AIRWAY INHALATION TREATMENT: CPT

## 2024-11-21 PROCEDURE — 6370000000 HC RX 637 (ALT 250 FOR IP): Performed by: INTERNAL MEDICINE

## 2024-11-21 RX ORDER — MORPHINE SULFATE 20 MG/ML
10 SOLUTION ORAL
Status: DISCONTINUED | OUTPATIENT
Start: 2024-11-21 | End: 2024-11-25 | Stop reason: HOSPADM

## 2024-11-21 RX ORDER — FLUTICASONE PROPIONATE 50 MCG
1 SPRAY, SUSPENSION (ML) NASAL 2 TIMES DAILY PRN
Status: DISCONTINUED | OUTPATIENT
Start: 2024-11-21 | End: 2024-11-25 | Stop reason: HOSPADM

## 2024-11-21 RX ORDER — LORAZEPAM 0.5 MG/1
1 TABLET ORAL EVERY 4 HOURS PRN
Status: DISCONTINUED | OUTPATIENT
Start: 2024-11-21 | End: 2024-11-25 | Stop reason: HOSPADM

## 2024-11-21 RX ADMIN — Medication 5 MG: at 17:28

## 2024-11-21 RX ADMIN — BUSPIRONE HYDROCHLORIDE 10 MG: 10 TABLET ORAL at 21:11

## 2024-11-21 RX ADMIN — LORAZEPAM 1 MG: 0.5 TABLET ORAL at 22:25

## 2024-11-21 RX ADMIN — Medication 5 MG: at 09:01

## 2024-11-21 RX ADMIN — LORAZEPAM 1 MG: 0.5 TABLET ORAL at 18:57

## 2024-11-21 RX ADMIN — RIVAROXABAN 15 MG: 15 TABLET, FILM COATED ORAL at 17:30

## 2024-11-21 RX ADMIN — DOXYCYCLINE HYCLATE 100 MG: 100 CAPSULE ORAL at 21:11

## 2024-11-21 RX ADMIN — TAMSULOSIN HYDROCHLORIDE 0.4 MG: 0.4 CAPSULE ORAL at 21:11

## 2024-11-21 RX ADMIN — IPRATROPIUM BROMIDE AND ALBUTEROL SULFATE 1 DOSE: 2.5; .5 SOLUTION RESPIRATORY (INHALATION) at 18:21

## 2024-11-21 RX ADMIN — BUDESONIDE 500 MCG: 0.5 SUSPENSION RESPIRATORY (INHALATION) at 07:33

## 2024-11-21 RX ADMIN — IPRATROPIUM BROMIDE AND ALBUTEROL SULFATE 1 DOSE: 2.5; .5 SOLUTION RESPIRATORY (INHALATION) at 07:33

## 2024-11-21 RX ADMIN — IPRATROPIUM BROMIDE AND ALBUTEROL SULFATE 1 DOSE: 2.5; .5 SOLUTION RESPIRATORY (INHALATION) at 11:55

## 2024-11-21 RX ADMIN — LORAZEPAM 0.5 MG: 0.5 TABLET ORAL at 13:12

## 2024-11-21 RX ADMIN — BUSPIRONE HYDROCHLORIDE 10 MG: 10 TABLET ORAL at 16:35

## 2024-11-21 RX ADMIN — METOPROLOL SUCCINATE 25 MG: 50 TABLET, EXTENDED RELEASE ORAL at 08:18

## 2024-11-21 RX ADMIN — GUAIFENESIN 600 MG: 600 TABLET, EXTENDED RELEASE ORAL at 21:11

## 2024-11-21 RX ADMIN — Medication 10 MG: at 21:11

## 2024-11-21 RX ADMIN — DOXYCYCLINE HYCLATE 100 MG: 100 CAPSULE ORAL at 08:19

## 2024-11-21 RX ADMIN — BUDESONIDE 500 MCG: 0.5 SUSPENSION RESPIRATORY (INHALATION) at 18:21

## 2024-11-21 RX ADMIN — Medication 5 MG: at 13:11

## 2024-11-21 RX ADMIN — SODIUM BICARBONATE 650 MG: 650 TABLET ORAL at 16:35

## 2024-11-21 RX ADMIN — GUAIFENESIN 600 MG: 600 TABLET, EXTENDED RELEASE ORAL at 08:19

## 2024-11-21 RX ADMIN — SODIUM BICARBONATE 650 MG: 650 TABLET ORAL at 21:11

## 2024-11-21 RX ADMIN — BUSPIRONE HYDROCHLORIDE 10 MG: 10 TABLET ORAL at 08:18

## 2024-11-21 RX ADMIN — IPRATROPIUM BROMIDE AND ALBUTEROL SULFATE 1 DOSE: 2.5; .5 SOLUTION RESPIRATORY (INHALATION) at 15:28

## 2024-11-21 RX ADMIN — SODIUM BICARBONATE 650 MG: 650 TABLET ORAL at 08:19

## 2024-11-21 RX ADMIN — PREDNISONE 10 MG: 10 TABLET ORAL at 08:18

## 2024-11-21 ASSESSMENT — PAIN SCALES - GENERAL
PAINLEVEL_OUTOF10: 4
PAINLEVEL_OUTOF10: 0
PAINLEVEL_OUTOF10: 0

## 2024-11-21 NOTE — PROGRESS NOTES
SN present at the patient's bedside to assess the patient's pain level and restlessness. Patient is sleeping comfortably with no signs or symptoms of pain or agitation noted. No family is present at the bedside. Will continue to monitor.

## 2024-11-21 NOTE — PROGRESS NOTES
Patient Seen in: [facility]      History   Patient presents with:   Foot Injury    Stated Complaint: Hit top of her foot, bump, pain, hard to walk    Subjective:   HPI    This is a well-appearing 51-year-old female who presents with right dors Pt repositioned in bed for comfort, breakfast tray remove with 100 % of meal consumed.  Pt became SOA with RR 28.  Roxanol 5 mg given for SOA.  Pt denies pain and other symptoms.  Safety measures in place.     Eyes:      General: Lids are normal.      Extraocular Movements: Extraocular movements intact. Conjunctiva/sclera: Conjunctivae normal.      Pupils: Pupils are equal, round, and reactive to light.    Cardiovascular:      Rate and Rhythm: Normal rate supportive care. Medical Record Review/reassessment: I independently  reviewed available prior medical records for any recent pertinent discharge summaries/testing.  This includes but not limited to OP/IP visits, radiology tests , clinical labs tests, EK

## 2024-11-21 NOTE — PROGRESS NOTES
PRN Roxanol 5 mg and PRN Ativan 0.5 mg tab given for SOA and anxiety related to SOA.  Pt given fresh ice water.  Pt repositioned for comfort.  Pt has no other need.  Pt consumed 75% of his lunch.  Pt has no other needs.  Safety measures in place.   Will continue to monitor Pt.

## 2024-11-21 NOTE — PROGRESS NOTES
SN present at the patient's bedside to assess the patient's pain level and SOA. Patient is sleeping comfortably with no signs or symptoms of pain or labored breathing at this time. Will continue to monitor.

## 2024-11-21 NOTE — PROGRESS NOTES
Pt  given scheduled medications   Pt states he has no symptoms of distress and has needs.  Safety measures in place.

## 2024-11-21 NOTE — PROGRESS NOTES
Dr Trent on HCC unit and is aware of Pt's Respite level of care.  Pt has no new needs at this time.  Pt is receiving  Roxanol and and Ativan for SOA.

## 2024-11-21 NOTE — PROGRESS NOTES
Sophia Horner (goes by Prabhjot) remains RESPITE LOC in [address] at the [facility].  His discharge day is Monday.  He arrived via non-emergent EMS and will return home the same way.  He does have an EMS DNR on file at the nurses' station.  His brother Hai is with him in his room.  Prabhjot is alert and oriented.  He is [place] which can create a communication barrier, but when you talk deep and loud he can understand and converse.  He tells me that his cancer has never caused him pain.  He is short of air and respiratory has been here for a breathing tx.  He has on his O2.    Hai stated that he had talked to Xiang (son) last night and updated him on Prabhjot' condition.  We talked again about safety concerns.  Hai reiterated that he cannot provide  more availability than he has been (3 times per day for meals and meds).  Prabhjot insists that he will be returning from home.  He has a sister at [facility] but he states he is not going there.  He remains very insistent that he will return home and he has no one else to help.  He voices understanding of the safety concerns.  I did advise to get a medical alert.  He gave me permission to speak to Abran (neighbor) and Xiang (Son).    I called Abran  ([phone]).  He explained that he lives behind Prabhjot and has for 12 years.  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  He states that he goes over every morning before he goes to work at 6am and goes back approx 9:30 each night.  He states that Prabhjot calls him if there is a need and he did so at 3:30am on Wednesday morning and Abran stayed with him for a few hours.  Abran is willing to continue this but is not able to do much more due to his work obligations.  I thanked him and updated him on the plan for return home on Monday.  I called Xiang (son).  He stated that he works and lives 9 hours away in [place].  He stated that he does not have much leave and so not able to come and assist.  I explained to him the

## 2024-11-21 NOTE — PROGRESS NOTES
Pt given PRN Roxanol 5 mg for SOA with labored RR 24, and scheduled Xarelto.  Dinner set up on BST and Pt repositioned in bed to eat.  Pt denies pain, other symptoms and needs.  Safety measures in place.

## 2024-11-21 NOTE — PROGRESS NOTES
Pt repositioned for lunch.  Lunch set up and Pt sitting up eating.  Pt has no symptoms or needs at this time.  Safety measures in place.  Will continue to monitor Pt.

## 2024-11-21 NOTE — PROGRESS NOTES
RN to RN bedside rounds and report completed, patient resting quietly with eyes closed. Bed in low position/locked and SR up x2 call light within reach.

## 2024-11-21 NOTE — PROGRESS NOTES
Add 38054 Cpt? (Important Note: In 2017 The Use Of 51195 Is Being Tracked By Cms To Determine Future Global Period Reimbursement For Global Periods): yes Follow up to PRN Roxanol and PRN Ativan  for SOA and anxiety with RR labored at 32.  Pt is currently sleeping with FLACC of 0, unlabored RR of 18.  Interventions effective.  Safety measures in place.     Detail Level: Detailed

## 2024-11-21 NOTE — PROGRESS NOTES
Patient requesting snacks as he states he did not eat lunch or supper. SN founds patient granola bar, gonzalez crackers and a package of peanut butter cracker and orange sherbet. Patient turned to his back for snack time.

## 2024-11-21 NOTE — PROGRESS NOTES
Pt follow up to PRN Roxanol 5 mg for SOA.  Pt is sleeping with FLACC of 0 and RR unlabored at 16.  Pt has no needs at this time.  Safety measures in place.

## 2024-11-21 NOTE — PROGRESS NOTES
Patient has finished his snacks and now complains of feeling SOA. Morphine 5 mg given SL R24. Will continue to monitor.      0000 PRN follow up  Effective, resting quietly with eyes closed R20.

## 2024-11-21 NOTE — PROGRESS NOTES
The pt is unable to respond. No needs identified.  Prayer support provided. No family present.. Will follow.

## 2024-11-21 NOTE — PROGRESS NOTES
Pt report and rounding with off going shift staff.  Pt is awake, denies symptoms and needs.  Pt's pillows readjusted for his comfort.  Pt's bed in lowest position, locked with side rails raised x 3, call light within reach

## 2024-11-21 NOTE — PROGRESS NOTES
Pt repositioned in bed and breakfast set up.  Pt's scheduled medications given.  Pt denies pain, and symptoms at this time.  Pt has no new needs.  Safety measures  in place.

## 2024-11-21 NOTE — PROGRESS NOTES
SN present at the patient's bedside to assess the patient's pain level and SOA. Patient is sleeping comfortably with no signs or symptoms of pain or labored breathing at this time. The bed is in the lowest position with the wheels locked in place. The call light is within reach. Will continue to monitor.

## 2024-11-21 NOTE — PROGRESS NOTES
Vital signs taken.  Pt denies symptoms and needs.  Sips of water given.  Safety measures in place.

## 2024-11-21 NOTE — PROGRESS NOTES
Scheduled medications given whole without difficulty. Turned and repositioned to his right side using pillows for support and to float heels off bed.

## 2024-11-22 PROCEDURE — 6370000000 HC RX 637 (ALT 250 FOR IP): Performed by: INTERNAL MEDICINE

## 2024-11-22 PROCEDURE — 6360000002 HC RX W HCPCS: Performed by: INTERNAL MEDICINE

## 2024-11-22 PROCEDURE — 94640 AIRWAY INHALATION TREATMENT: CPT

## 2024-11-22 PROCEDURE — 6560000002 HC HOSPICE GENERAL INPATIENT

## 2024-11-22 PROCEDURE — 2700000000 HC OXYGEN THERAPY PER DAY

## 2024-11-22 RX ADMIN — RIVAROXABAN 15 MG: 15 TABLET, FILM COATED ORAL at 18:54

## 2024-11-22 RX ADMIN — Medication 1 MG: at 14:59

## 2024-11-22 RX ADMIN — PREDNISONE 10 MG: 10 TABLET ORAL at 09:32

## 2024-11-22 RX ADMIN — SODIUM BICARBONATE 650 MG: 650 TABLET ORAL at 14:59

## 2024-11-22 RX ADMIN — IPRATROPIUM BROMIDE AND ALBUTEROL SULFATE 1 DOSE: 2.5; .5 SOLUTION RESPIRATORY (INHALATION) at 06:35

## 2024-11-22 RX ADMIN — SODIUM BICARBONATE 650 MG: 650 TABLET ORAL at 09:32

## 2024-11-22 RX ADMIN — IPRATROPIUM BROMIDE AND ALBUTEROL SULFATE 1 DOSE: 2.5; .5 SOLUTION RESPIRATORY (INHALATION) at 14:35

## 2024-11-22 RX ADMIN — METOPROLOL SUCCINATE 25 MG: 50 TABLET, EXTENDED RELEASE ORAL at 09:31

## 2024-11-22 RX ADMIN — GUAIFENESIN 600 MG: 600 TABLET, EXTENDED RELEASE ORAL at 09:32

## 2024-11-22 RX ADMIN — BUSPIRONE HYDROCHLORIDE 10 MG: 10 TABLET ORAL at 09:31

## 2024-11-22 RX ADMIN — Medication 10 MG: at 18:07

## 2024-11-22 RX ADMIN — TAMSULOSIN HYDROCHLORIDE 0.4 MG: 0.4 CAPSULE ORAL at 20:26

## 2024-11-22 RX ADMIN — GUAIFENESIN 600 MG: 600 TABLET, EXTENDED RELEASE ORAL at 20:26

## 2024-11-22 RX ADMIN — BUSPIRONE HYDROCHLORIDE 10 MG: 10 TABLET ORAL at 14:59

## 2024-11-22 RX ADMIN — Medication 10 MG: at 14:59

## 2024-11-22 RX ADMIN — Medication 10 MG: at 22:30

## 2024-11-22 RX ADMIN — BUDESONIDE 500 MCG: 0.5 SUSPENSION RESPIRATORY (INHALATION) at 06:35

## 2024-11-22 RX ADMIN — LORAZEPAM 1 MG: 0.5 TABLET ORAL at 18:07

## 2024-11-22 RX ADMIN — LORAZEPAM 1 MG: 0.5 TABLET ORAL at 22:30

## 2024-11-22 RX ADMIN — SODIUM BICARBONATE 650 MG: 650 TABLET ORAL at 20:26

## 2024-11-22 RX ADMIN — Medication 1 MG: at 20:26

## 2024-11-22 RX ADMIN — LORAZEPAM 1 MG: 0.5 TABLET ORAL at 12:14

## 2024-11-22 RX ADMIN — BUSPIRONE HYDROCHLORIDE 10 MG: 10 TABLET ORAL at 20:26

## 2024-11-22 NOTE — PROGRESS NOTES
Mr Evens (Prabhjot) has just finished getting bathed.  He is alert and oriented and able to make his needs and wishes known.  In spite of further discussion and urging, he continues to insist that he will return home without 24/7 care in spite of being dependent for ADLs.    [facility] is aware.

## 2024-11-22 NOTE — PROGRESS NOTES
FOLLOWUP for Ativan dose from Reina De León RN: Patient was pleasant and able to talk about his job and family.  He is orientated and resting quietly. FLACC 2/10 with SOA.

## 2024-11-22 NOTE — PROGRESS NOTES
SHORTNESS OF AIR: Patient given Roxanol 10mg orally for his shortness of air, rated 4/10.  He is noted with anxiety with his shortness of air.  Patient is hard of hearing also, so conversation was more pronounced for more effective understanding by RN.    At 21:50pm at FOLLOWUP: Patient states he is feeling better and breathing more clearly.  FLACC 0/10.

## 2024-11-22 NOTE — PROGRESS NOTES
Pt called out stating he was still SOA.  Pt informed medication may not have had time to work.  Pt informed he might could have Ativan.  Pt chose to wait a little longer.  At 1820 Pt check found Pt sleeping with FLACC of 0 and no symptoms of distress.   Safety measures in place.

## 2024-11-22 NOTE — PROGRESS NOTES
ANXIETY: Patient has anxiety with his shortness of air.  He was given Ativan 1mg orally for his breathing.    At 23:00pm FOLLOWUP: Patient was resting with eyes closed, breathing rhythmically and calmly.

## 2024-11-22 NOTE — PROGRESS NOTES
Orders received from Dr Trent to change frequency and dose of Ativan to 1 mg Q4H/PRN and gave order for Morphine concentrate 10 mg Q3H/PRN for anxiety and SOA.  Pt informed new orders received and hs dinner tray removed with 75% intake.  Safety measures in place.

## 2024-11-22 NOTE — PROGRESS NOTES
PRN Ativan 1 mg given with sips of water.  Pt has complaints of SOA and was given Morphine concentrate 5 mg at 1730 for SOA.  Pt given fresh water.  Safety measures in place.

## 2024-11-22 NOTE — PROGRESS NOTES
The pt is lying in bed. He is awake and oriented.  No needs expressed/.  He did accept the offer for prayer.  Prayer support provided.  Will follow.

## 2024-11-23 PROCEDURE — 2700000000 HC OXYGEN THERAPY PER DAY

## 2024-11-23 PROCEDURE — 6360000002 HC RX W HCPCS: Performed by: INTERNAL MEDICINE

## 2024-11-23 PROCEDURE — 94640 AIRWAY INHALATION TREATMENT: CPT

## 2024-11-23 PROCEDURE — 6370000000 HC RX 637 (ALT 250 FOR IP): Performed by: INTERNAL MEDICINE

## 2024-11-23 PROCEDURE — 6560000002 HC HOSPICE GENERAL INPATIENT

## 2024-11-23 RX ORDER — DIPHENHYDRAMINE HCL 25 MG
25 TABLET ORAL EVERY 6 HOURS PRN
Status: DISCONTINUED | OUTPATIENT
Start: 2024-11-23 | End: 2024-11-25 | Stop reason: HOSPADM

## 2024-11-23 RX ADMIN — RIVAROXABAN 15 MG: 15 TABLET, FILM COATED ORAL at 17:44

## 2024-11-23 RX ADMIN — GUAIFENESIN 600 MG: 600 TABLET, EXTENDED RELEASE ORAL at 20:38

## 2024-11-23 RX ADMIN — IPRATROPIUM BROMIDE AND ALBUTEROL SULFATE 1 DOSE: 2.5; .5 SOLUTION RESPIRATORY (INHALATION) at 14:45

## 2024-11-23 RX ADMIN — PREDNISONE 10 MG: 10 TABLET ORAL at 15:33

## 2024-11-23 RX ADMIN — IPRATROPIUM BROMIDE AND ALBUTEROL SULFATE 1 DOSE: 2.5; .5 SOLUTION RESPIRATORY (INHALATION) at 06:24

## 2024-11-23 RX ADMIN — BUSPIRONE HYDROCHLORIDE 10 MG: 10 TABLET ORAL at 20:38

## 2024-11-23 RX ADMIN — FLUTICASONE PROPIONATE 1 SPRAY: 50 SPRAY, METERED NASAL at 17:51

## 2024-11-23 RX ADMIN — SODIUM BICARBONATE 650 MG: 650 TABLET ORAL at 15:33

## 2024-11-23 RX ADMIN — BUSPIRONE HYDROCHLORIDE 10 MG: 10 TABLET ORAL at 15:33

## 2024-11-23 RX ADMIN — IPRATROPIUM BROMIDE AND ALBUTEROL SULFATE 1 DOSE: 2.5; .5 SOLUTION RESPIRATORY (INHALATION) at 19:01

## 2024-11-23 RX ADMIN — LORAZEPAM 1 MG: 0.5 TABLET ORAL at 07:57

## 2024-11-23 RX ADMIN — IPRATROPIUM BROMIDE AND ALBUTEROL SULFATE 1 DOSE: 2.5; .5 SOLUTION RESPIRATORY (INHALATION) at 10:13

## 2024-11-23 RX ADMIN — TAMSULOSIN HYDROCHLORIDE 0.4 MG: 0.4 CAPSULE ORAL at 20:38

## 2024-11-23 RX ADMIN — Medication 1 MG: at 20:38

## 2024-11-23 RX ADMIN — SALINE NASAL SPRAY 1 SPRAY: 1.5 SOLUTION NASAL at 18:30

## 2024-11-23 RX ADMIN — Medication 10 MG: at 07:56

## 2024-11-23 RX ADMIN — METOPROLOL SUCCINATE 25 MG: 50 TABLET, EXTENDED RELEASE ORAL at 15:33

## 2024-11-23 RX ADMIN — BUDESONIDE 500 MCG: 0.5 SUSPENSION RESPIRATORY (INHALATION) at 19:00

## 2024-11-23 RX ADMIN — BUDESONIDE 500 MCG: 0.5 SUSPENSION RESPIRATORY (INHALATION) at 06:24

## 2024-11-23 RX ADMIN — LORAZEPAM 1 MG: 0.5 TABLET ORAL at 20:38

## 2024-11-23 RX ADMIN — SODIUM BICARBONATE 650 MG: 650 TABLET ORAL at 20:38

## 2024-11-23 ASSESSMENT — PAIN DESCRIPTION - LOCATION: LOCATION: BACK

## 2024-11-23 ASSESSMENT — PAIN SCALES - GENERAL: PAINLEVEL_OUTOF10: 8

## 2024-11-23 NOTE — PROGRESS NOTES
Dr Trent on unit to round on patients.  Patient remains respite at this time.  Orders received for Benadryl 25 mg po for itching and saline nasal spray as needed for dry nose.

## 2024-11-23 NOTE — PROGRESS NOTES
Routine medication given at this time, crushed and in pudding.  Patient swallowed without difficulty.  Took a sip of water without difficulty.  Denies any pain at this time.  Patient has been sleeping all day.  Side rails up x2, call light within reach, bed locked and in lowest position. Will monitor.

## 2024-11-23 NOTE — PROGRESS NOTES
Flonase nasal spray to each nostril at this time.  Patient c/o nasal congestion.  Denies any pain or discomfort at this time.  Side rails up x3, call light within reach, bed locked and in lowest position. Family at bedside. Will continue to monitor.

## 2024-11-23 NOTE — PROGRESS NOTES
Follow to pain medication.  Patient lying in bed with eyes closed.  Respirations even and unlabored with O2 at 4 L/NC.  No signs or symptoms of distress noted.  FLACC 0.  Side rails up x3, call light within reach, bed locked and in lowest position, bed alarm on.  Will continue to monitor.

## 2024-11-23 NOTE — PROGRESS NOTES
Change of shift report and rounds. Patient sitting up in bed talking to son on phone. Bed in lowest position and locked. Call bell within reach. Upper rails up x 2

## 2024-11-23 NOTE — PROGRESS NOTES
Rounds and bedside report with night shift nurse at this time.  Patient in bed and restless.  O2 at 4L/NC.  States that his nose is stopped up and cannot breath.  Patient pulled up in bed and HOB elevated to 45 degrees. Denies any pain at this time.  Side rails up x3, call light within reach, bed alarm on, bed locked and in lowest position.

## 2024-11-23 NOTE — PROGRESS NOTES
Patient complaining of anxiety and SOA. Gave PRN lorazepam 1 mg PO and PRN morphine 10 mg PO. Patient asked for O2 to be turned down. O2 sat 94%. Patient says he uses 4L O2 at home, reduced NC to 4L.

## 2024-11-23 NOTE — PROGRESS NOTES
Morphine 10 mg and Ativan 1 mg PO given at this time for restlessness and pain.  Swallowed medication without difficulty.  Patient states that his pain is in his back. Patient  repositioned in bed to  right side with pillows behind back and between knees for comfort.  O2 at 4 L/NC.  Respirations even but slightly labored.  Side rails up x3, call light within reach, bed locked and in lowest position, bed alarm on.  Will monitor.

## 2024-11-23 NOTE — PROGRESS NOTES
Follow up lorazepam and morphine- patient resting quietly in bed. When asked still complaining of SOA. No distress noted. O2 Sat 96% on 4LNC

## 2024-11-23 NOTE — PROGRESS NOTES
Fed patient supper.  He ate approximately 3 bites of chicken, 1/3 bowel of pudding, and 1/2 glass of tea. Denies any pain or discomfort at this time.  Respirations even and unlabored.  O2 at 4L/NC.  Side rails up x3, call light within reach, bed locked and in lowest position. Family at bedside. Will continue to monitor.

## 2024-11-24 PROCEDURE — 6560000002 HC HOSPICE GENERAL INPATIENT

## 2024-11-24 PROCEDURE — 6370000000 HC RX 637 (ALT 250 FOR IP): Performed by: INTERNAL MEDICINE

## 2024-11-24 PROCEDURE — 2700000000 HC OXYGEN THERAPY PER DAY

## 2024-11-24 PROCEDURE — 6360000002 HC RX W HCPCS: Performed by: INTERNAL MEDICINE

## 2024-11-24 PROCEDURE — 94640 AIRWAY INHALATION TREATMENT: CPT

## 2024-11-24 RX ADMIN — TAMSULOSIN HYDROCHLORIDE 0.4 MG: 0.4 CAPSULE ORAL at 20:50

## 2024-11-24 RX ADMIN — BUDESONIDE 500 MCG: 0.5 SUSPENSION RESPIRATORY (INHALATION) at 06:31

## 2024-11-24 RX ADMIN — IPRATROPIUM BROMIDE AND ALBUTEROL SULFATE 1 DOSE: 2.5; .5 SOLUTION RESPIRATORY (INHALATION) at 19:28

## 2024-11-24 RX ADMIN — LORAZEPAM 1 MG: 0.5 TABLET ORAL at 17:11

## 2024-11-24 RX ADMIN — RIVAROXABAN 15 MG: 15 TABLET, FILM COATED ORAL at 17:11

## 2024-11-24 RX ADMIN — METOPROLOL SUCCINATE 25 MG: 50 TABLET, EXTENDED RELEASE ORAL at 08:27

## 2024-11-24 RX ADMIN — PREDNISONE 10 MG: 10 TABLET ORAL at 08:27

## 2024-11-24 RX ADMIN — GUAIFENESIN 600 MG: 600 TABLET, EXTENDED RELEASE ORAL at 20:50

## 2024-11-24 RX ADMIN — GUAIFENESIN 600 MG: 600 TABLET, EXTENDED RELEASE ORAL at 08:27

## 2024-11-24 RX ADMIN — SODIUM BICARBONATE 650 MG: 650 TABLET ORAL at 15:27

## 2024-11-24 RX ADMIN — BUSPIRONE HYDROCHLORIDE 10 MG: 10 TABLET ORAL at 15:27

## 2024-11-24 RX ADMIN — BUSPIRONE HYDROCHLORIDE 10 MG: 10 TABLET ORAL at 08:27

## 2024-11-24 RX ADMIN — Medication 10 MG: at 15:36

## 2024-11-24 RX ADMIN — IPRATROPIUM BROMIDE AND ALBUTEROL SULFATE 1 DOSE: 2.5; .5 SOLUTION RESPIRATORY (INHALATION) at 14:53

## 2024-11-24 RX ADMIN — BUDESONIDE 500 MCG: 0.5 SUSPENSION RESPIRATORY (INHALATION) at 19:28

## 2024-11-24 RX ADMIN — BUSPIRONE HYDROCHLORIDE 10 MG: 10 TABLET ORAL at 20:50

## 2024-11-24 RX ADMIN — IPRATROPIUM BROMIDE AND ALBUTEROL SULFATE 1 DOSE: 2.5; .5 SOLUTION RESPIRATORY (INHALATION) at 06:31

## 2024-11-24 RX ADMIN — SODIUM BICARBONATE 650 MG: 650 TABLET ORAL at 20:50

## 2024-11-24 RX ADMIN — IPRATROPIUM BROMIDE AND ALBUTEROL SULFATE 1 DOSE: 2.5; .5 SOLUTION RESPIRATORY (INHALATION) at 10:26

## 2024-11-24 RX ADMIN — FLUTICASONE PROPIONATE 1 SPRAY: 50 SPRAY, METERED NASAL at 08:27

## 2024-11-24 RX ADMIN — SODIUM BICARBONATE 650 MG: 650 TABLET ORAL at 08:27

## 2024-11-24 ASSESSMENT — PAIN SCALES - GENERAL
PAINLEVEL_OUTOF10: 0
PAINLEVEL_OUTOF10: 5

## 2024-11-24 NOTE — PROGRESS NOTES
Assessment done at this time.  Patient lying in bed with eyes closed.  Does open eyes and responds appropriately to verbal stimuli.  Denies any pain at this time.  Respirations even and unlabored at this time with O2 at 4L/NC.  No signs or symptoms of distress noted.  Mepilex to left and right buttock dry and intact.  Side rails up x2, call light within reach, bed locked and in lowest position. Will monitor.

## 2024-11-24 NOTE — PROGRESS NOTES
Rounds and bedside report with night shift nurse at this time.  Patient lying in bed with eyes closed.  Respirations even and unlabored with O2 at 4 L/NC.  No signs or symptoms of distress noted. Felix to bedside drainage. Side rails up x3, call light within reach, bed locked and in lowest position.

## 2024-11-24 NOTE — PROGRESS NOTES
SHORTNESS OF AIR: Patient given Roxanol 10mg intensol for his shortness of breath.  He had been awakened for his afternoon medications after his breathing treatment and he was struggling to hold a conversation due to shortness of air.  He remains pleasant and cooperative, but is labored in breathing.  FLACC 5/10 with SOA.    At FOLLOW UP at 16:00pm: Patient had relaxed breathing, more rhythmic and calm.  Eyes closed, mouth closed.  FLACC 0/10.

## 2024-11-24 NOTE — PROGRESS NOTES
Change of shift report and rounds. Patient talking to self in bed. Denies pain at this time. Bed in lowest position and locked. Call bell within reach. Upper rails up x 2.

## 2024-11-24 NOTE — PROGRESS NOTES
Dr Trent here on rounds, he discussed patients plan of care.  Patient remains at RESPITE level of care, staying until tomorrow for his caregiver break. Patient will return to his home via EMS transfer, with his brother, Hai being his sole caregiver.

## 2024-11-24 NOTE — PROGRESS NOTES
Patient very agitated. Calling out  for \" Hai\" to help. PRN haloperidol and PRN lorazepam given with scheduled guaifenesin, buspirone, sodium bicarbonate and tamsulosin. Patient took all medications whole without difficulty

## 2024-11-24 NOTE — PROGRESS NOTES
Routine medication given at this time.  Patient short of air this morning after eating breakfast.  Pills crushed and put in pudding.  Patient swallowed without difficulty.  Flonase nasal spray given at this time per patient request for stuffy nose. Patient repositioned in bed and HOB elevated to 45 degrees.  Side rails up x2, call light within reach, bed locked and in lowest position. Will continue to monitor.

## 2024-11-24 NOTE — PROGRESS NOTES
Patients brother came to nurses station to ask about how patient was doing.  Marty MAC RN gave him update on his night and current status. Hai desired for his wife's cell phone or his cell phone to be called to update them when patient will be picked up via ambulance so he can be there at patients house to receive him. Hai states the house has a ramp, hospital bed, wheelchair, potty chair, bedside table, and shower transfer bench for equipment to help patient.  He states two boxes of supplies came last week and patient did not need anything for medication either.  Hai states he will need someone to review the medication changes with him since he administers pills to patient.  Explained that [facility] would follow patient and provide a tuck in call or visit to ensure that patients medication regime was accurate. Hai's phone numbers were reviewed with staff and he was satisfied with plan of care.

## 2024-11-25 VITALS
OXYGEN SATURATION: 89 % | TEMPERATURE: 96.7 F | DIASTOLIC BLOOD PRESSURE: 57 MMHG | RESPIRATION RATE: 32 BRPM | HEART RATE: 58 BPM | SYSTOLIC BLOOD PRESSURE: 105 MMHG

## 2024-11-25 PROCEDURE — 6370000000 HC RX 637 (ALT 250 FOR IP): Performed by: INTERNAL MEDICINE

## 2024-11-25 PROCEDURE — 6360000002 HC RX W HCPCS: Performed by: INTERNAL MEDICINE

## 2024-11-25 PROCEDURE — 2700000000 HC OXYGEN THERAPY PER DAY

## 2024-11-25 PROCEDURE — 94640 AIRWAY INHALATION TREATMENT: CPT

## 2024-11-25 RX ORDER — SENNOSIDES A AND B 8.6 MG/1
1 TABLET, FILM COATED ORAL 2 TIMES DAILY
Status: DISCONTINUED | OUTPATIENT
Start: 2024-11-25 | End: 2024-11-25 | Stop reason: HOSPADM

## 2024-11-25 RX ADMIN — Medication 10 MG: at 09:14

## 2024-11-25 RX ADMIN — IPRATROPIUM BROMIDE AND ALBUTEROL SULFATE 1 DOSE: 2.5; .5 SOLUTION RESPIRATORY (INHALATION) at 10:20

## 2024-11-25 RX ADMIN — Medication 1 MG: at 00:29

## 2024-11-25 RX ADMIN — SODIUM BICARBONATE 650 MG: 650 TABLET ORAL at 08:11

## 2024-11-25 RX ADMIN — LORAZEPAM 1 MG: 0.5 TABLET ORAL at 09:14

## 2024-11-25 RX ADMIN — Medication 10 MG: at 11:27

## 2024-11-25 RX ADMIN — BUSPIRONE HYDROCHLORIDE 10 MG: 10 TABLET ORAL at 08:12

## 2024-11-25 RX ADMIN — METOPROLOL SUCCINATE 25 MG: 50 TABLET, EXTENDED RELEASE ORAL at 08:11

## 2024-11-25 RX ADMIN — Medication 1 MG: at 11:20

## 2024-11-25 RX ADMIN — MAGNESIUM HYDROXIDE 30 ML: 400 SUSPENSION ORAL at 09:13

## 2024-11-25 RX ADMIN — IPRATROPIUM BROMIDE AND ALBUTEROL SULFATE 1 DOSE: 2.5; .5 SOLUTION RESPIRATORY (INHALATION) at 06:20

## 2024-11-25 RX ADMIN — BUDESONIDE 500 MCG: 0.5 SUSPENSION RESPIRATORY (INHALATION) at 06:20

## 2024-11-25 RX ADMIN — GUAIFENESIN 600 MG: 600 TABLET, EXTENDED RELEASE ORAL at 08:11

## 2024-11-25 RX ADMIN — PREDNISONE 10 MG: 10 TABLET ORAL at 08:12

## 2024-11-25 ASSESSMENT — PAIN SCALES - GENERAL: PAINLEVEL_OUTOF10: 0

## 2024-11-25 NOTE — PROGRESS NOTES
Pt given PRN Morphine concentrate 10 mg and PRN Ativan 1 mg for SOA, labored RR 28, and anxiety related to SOA.  MOM given due to no BM since 11/18.  Pt is preparing for bed bath by SHARONDA Candelario.  Pt refused bath during the night.  Pt has no other needs.  Safety measures in place.

## 2024-11-25 NOTE — PROGRESS NOTES
ANXIETY with SOA: Patient was given Ativan 1mg tablets for anxiety associated with his shortness of air.  He had finished his meal and talked to his son on the telephone and was catching up on his breathing mechanics.  Patient denies pain, FLACC 3/10 with air hunger.    AT FOLLOWUP at 17:32pm: Patient was resting quietly, breathing rhythmically and calmly.  FLACC 0/10.  Brow line relaxed, phone placed back on wall counter.  Call light in reach, lights dimmed. Patient smiled at RN stating he was comfortable.

## 2024-11-25 NOTE — PROGRESS NOTES
Pt report and rounding with off going shift staff.  Pt is sleeping with FLACC of 0 and has no symptoms of pain or distress.  Pt's bed in lowest position, locked with side rails raised x 3, call light within reach, and Pt's room across from nurses desk for observation.

## 2024-11-25 NOTE — PROGRESS NOTES
Patient was pulled up in bed to eat his dinner tray.  He states he had not had a BM in three weeks.  He states he uses [facility] at home.  [facility]SC obtained for patients room and discussed medication options to patient.  He would like to take laxative source this evening. Meal tray was set up for him. He ate 90% of meal.  His phone was placed beside him for his son to call.

## 2024-11-25 NOTE — PROGRESS NOTES
Dr Trent on HCC unit.  Pt hasn't had BM since 11/18.  Dr Trent added MOM and Senna to Pt's medications.  Pt has no other needs.  Pt remains Respite level of care until he returns home today with hospice.

## 2024-11-25 NOTE — PROGRESS NOTES
Follow up to PRN Morphine, PRN Ativan, and PRN MOM.  Pt states his SOA and anxiety are better at this time and he is resting with no symptoms.   Safety measures in place.

## 2024-11-25 NOTE — PROGRESS NOTES
Pt awakened for breakfast responding to voice.  Pt was repositioned in bed for breakfast.  Pt given scheduled medications including Buspar for anxiety.  Pt has complaints of SOA but doesn't want PRN meds at this time.  Pt has no other needs at this time.  Pt assessment completed.  Safety measures in place..

## 2024-11-25 NOTE — PROGRESS NOTES
Change of shift report and rounds. Patient resting quietly in bed. Bed in lowest position and locked. Call bell within reach. Upper rails up x 2

## 2024-11-25 NOTE — PROGRESS NOTES
Mr. Horner left today via nonemergent EMS transport to return home. He continued to decline further options/discussions related to his care as he does not have around the clock supervision and will not further examine or discuss options that could help him to be cared for around the clock.  AMRITA Franco with [facility] was made aware of the safety concerns and will be following.

## 2024-12-09 LAB
QT INTERVAL: 314 MS
QT INTERVAL: 410 MS
QT INTERVAL: 412 MS
QTC INTERVAL: 400 MS
QTC INTERVAL: 435 MS
QTC INTERVAL: 448 MS

## 2024-12-12 ENCOUNTER — TELEPHONE (OUTPATIENT)
Dept: PULMONOLOGY | Facility: CLINIC | Age: 84
End: 2024-12-12
Payer: COMMERCIAL

## 2024-12-12 NOTE — TELEPHONE ENCOUNTER
Called patient to reschedule their no show appointment that was originally scheduled on 12/04/2024 .      Appointment was not rescheduled due to patient is currently on hospice.

## 2025-01-04 ENCOUNTER — HOSPITAL ENCOUNTER (INPATIENT)
Age: 85
LOS: 4 days | Discharge: HOSPICE | DRG: 951 | End: 2025-01-08
Attending: INTERNAL MEDICINE | Admitting: FAMILY MEDICINE
Payer: MEDICARE

## 2025-01-04 PROCEDURE — 2700000000 HC OXYGEN THERAPY PER DAY

## 2025-01-04 PROCEDURE — 6560000002 HC HOSPICE GENERAL INPATIENT

## 2025-01-04 PROCEDURE — 6370000000 HC RX 637 (ALT 250 FOR IP): Performed by: FAMILY MEDICINE

## 2025-01-04 PROCEDURE — 2500000003 HC RX 250 WO HCPCS: Performed by: FAMILY MEDICINE

## 2025-01-04 PROCEDURE — 6370000000 HC RX 637 (ALT 250 FOR IP): Performed by: INTERNAL MEDICINE

## 2025-01-04 PROCEDURE — 6360000002 HC RX W HCPCS: Performed by: FAMILY MEDICINE

## 2025-01-04 PROCEDURE — 87635 SARS-COV-2 COVID-19 AMP PRB: CPT

## 2025-01-04 PROCEDURE — 94640 AIRWAY INHALATION TREATMENT: CPT

## 2025-01-04 PROCEDURE — 6550000002 HC HOSPICE INPATIENT RESPITE

## 2025-01-04 RX ORDER — METOPROLOL SUCCINATE 25 MG/1
25 TABLET, EXTENDED RELEASE ORAL DAILY
Status: DISCONTINUED | OUTPATIENT
Start: 2025-01-05 | End: 2025-01-08 | Stop reason: HOSPADM

## 2025-01-04 RX ORDER — BUDESONIDE 0.5 MG/2ML
0.5 INHALANT ORAL
Status: DISCONTINUED | OUTPATIENT
Start: 2025-01-04 | End: 2025-01-08 | Stop reason: HOSPADM

## 2025-01-04 RX ORDER — TAMSULOSIN HYDROCHLORIDE 0.4 MG/1
0.4 CAPSULE ORAL
Status: DISCONTINUED | OUTPATIENT
Start: 2025-01-04 | End: 2025-01-08 | Stop reason: HOSPADM

## 2025-01-04 RX ORDER — ACETAMINOPHEN 650 MG/1
650 SUPPOSITORY RECTAL EVERY 6 HOURS PRN
Status: DISCONTINUED | OUTPATIENT
Start: 2025-01-04 | End: 2025-01-08 | Stop reason: HOSPADM

## 2025-01-04 RX ORDER — BUSPIRONE HYDROCHLORIDE 10 MG/1
10 TABLET ORAL 3 TIMES DAILY
Status: DISCONTINUED | OUTPATIENT
Start: 2025-01-04 | End: 2025-01-08 | Stop reason: HOSPADM

## 2025-01-04 RX ORDER — FLUCONAZOLE 100 MG/1
200 TABLET ORAL DAILY
Status: DISCONTINUED | OUTPATIENT
Start: 2025-01-04 | End: 2025-01-05

## 2025-01-04 RX ORDER — HALOPERIDOL 2 MG/ML
1 SOLUTION ORAL EVERY 6 HOURS PRN
Status: DISCONTINUED | OUTPATIENT
Start: 2025-01-04 | End: 2025-01-08 | Stop reason: HOSPADM

## 2025-01-04 RX ORDER — SENNOSIDES A AND B 8.6 MG/1
1 TABLET, FILM COATED ORAL 2 TIMES DAILY
Status: DISCONTINUED | OUTPATIENT
Start: 2025-01-04 | End: 2025-01-08 | Stop reason: HOSPADM

## 2025-01-04 RX ORDER — PROCHLORPERAZINE MALEATE 10 MG
10 TABLET ORAL EVERY 6 HOURS PRN
Status: DISCONTINUED | OUTPATIENT
Start: 2025-01-04 | End: 2025-01-04 | Stop reason: SDUPTHER

## 2025-01-04 RX ORDER — GUAIFENESIN 600 MG/1
600 TABLET, EXTENDED RELEASE ORAL 2 TIMES DAILY PRN
Status: DISCONTINUED | OUTPATIENT
Start: 2025-01-04 | End: 2025-01-08 | Stop reason: HOSPADM

## 2025-01-04 RX ORDER — ACETAMINOPHEN 325 MG/1
650 TABLET ORAL EVERY 4 HOURS PRN
Status: DISCONTINUED | OUTPATIENT
Start: 2025-01-04 | End: 2025-01-08 | Stop reason: HOSPADM

## 2025-01-04 RX ORDER — PROCHLORPERAZINE MALEATE 10 MG
10 TABLET ORAL EVERY 6 HOURS PRN
Status: DISCONTINUED | OUTPATIENT
Start: 2025-01-04 | End: 2025-01-08 | Stop reason: HOSPADM

## 2025-01-04 RX ORDER — BISACODYL 10 MG
10 SUPPOSITORY, RECTAL RECTAL DAILY PRN
Status: DISCONTINUED | OUTPATIENT
Start: 2025-01-04 | End: 2025-01-08 | Stop reason: HOSPADM

## 2025-01-04 RX ORDER — IPRATROPIUM BROMIDE AND ALBUTEROL SULFATE 2.5; .5 MG/3ML; MG/3ML
1 SOLUTION RESPIRATORY (INHALATION)
Status: DISCONTINUED | OUTPATIENT
Start: 2025-01-04 | End: 2025-01-08 | Stop reason: HOSPADM

## 2025-01-04 RX ORDER — LORAZEPAM 0.5 MG/1
1 TABLET ORAL EVERY 4 HOURS PRN
Status: DISCONTINUED | OUTPATIENT
Start: 2025-01-04 | End: 2025-01-08 | Stop reason: HOSPADM

## 2025-01-04 RX ORDER — PREDNISONE 10 MG/1
10 TABLET ORAL DAILY
Status: DISCONTINUED | OUTPATIENT
Start: 2025-01-05 | End: 2025-01-08 | Stop reason: HOSPADM

## 2025-01-04 RX ORDER — MORPHINE SULFATE 20 MG/ML
5 SOLUTION ORAL
Status: DISCONTINUED | OUTPATIENT
Start: 2025-01-04 | End: 2025-01-08 | Stop reason: HOSPADM

## 2025-01-04 RX ADMIN — BUDESONIDE 500 MCG: 0.5 INHALANT ORAL at 19:46

## 2025-01-04 RX ADMIN — MICONAZOLE NITRATE 1 DOSE: 2 POWDER TOPICAL at 19:49

## 2025-01-04 RX ADMIN — IPRATROPIUM BROMIDE AND ALBUTEROL SULFATE 1 DOSE: 2.5; .5 SOLUTION RESPIRATORY (INHALATION) at 19:46

## 2025-01-04 RX ADMIN — ACETAMINOPHEN 650 MG: 325 TABLET ORAL at 22:45

## 2025-01-04 RX ADMIN — FLUCONAZOLE 200 MG: 100 TABLET ORAL at 18:22

## 2025-01-04 RX ADMIN — SENNOSIDES 8.6 MG: 8.6 TABLET, FILM COATED ORAL at 19:50

## 2025-01-04 RX ADMIN — BUSPIRONE HYDROCHLORIDE 10 MG: 10 TABLET ORAL at 19:50

## 2025-01-04 RX ADMIN — RIVAROXABAN 15 MG: 15 TABLET, FILM COATED ORAL at 18:22

## 2025-01-04 RX ADMIN — LORAZEPAM 1 MG: 0.5 TABLET ORAL at 22:46

## 2025-01-04 RX ADMIN — TAMSULOSIN HYDROCHLORIDE 0.4 MG: 0.4 CAPSULE ORAL at 19:50

## 2025-01-04 ASSESSMENT — PAIN DESCRIPTION - DESCRIPTORS: DESCRIPTORS: DISCOMFORT

## 2025-01-04 ASSESSMENT — PAIN SCALES - GENERAL: PAINLEVEL_OUTOF10: 3

## 2025-01-04 ASSESSMENT — PAIN DESCRIPTION - LOCATION: LOCATION: BACK

## 2025-01-04 ASSESSMENT — PAIN - FUNCTIONAL ASSESSMENT: PAIN_FUNCTIONAL_ASSESSMENT: PREVENTS OR INTERFERES SOME ACTIVE ACTIVITIES AND ADLS

## 2025-01-05 LAB — SARS-COV-2 RDRP RESP QL NAA+PROBE: NOT DETECTED

## 2025-01-05 PROCEDURE — 6360000002 HC RX W HCPCS: Performed by: FAMILY MEDICINE

## 2025-01-05 PROCEDURE — 6550000002 HC HOSPICE INPATIENT RESPITE

## 2025-01-05 PROCEDURE — 6370000000 HC RX 637 (ALT 250 FOR IP): Performed by: INTERNAL MEDICINE

## 2025-01-05 PROCEDURE — 6370000000 HC RX 637 (ALT 250 FOR IP): Performed by: FAMILY MEDICINE

## 2025-01-05 PROCEDURE — 2700000000 HC OXYGEN THERAPY PER DAY

## 2025-01-05 PROCEDURE — 94640 AIRWAY INHALATION TREATMENT: CPT

## 2025-01-05 PROCEDURE — 6560000002 HC HOSPICE GENERAL INPATIENT

## 2025-01-05 RX ORDER — HYDROCODONE BITARTRATE AND ACETAMINOPHEN 5; 325 MG/1; MG/1
1 TABLET ORAL EVERY 4 HOURS PRN
Status: DISCONTINUED | OUTPATIENT
Start: 2025-01-05 | End: 2025-01-05

## 2025-01-05 RX ORDER — BISACODYL 10 MG
10 SUPPOSITORY, RECTAL RECTAL DAILY PRN
Status: DISCONTINUED | OUTPATIENT
Start: 2025-01-05 | End: 2025-01-05

## 2025-01-05 RX ORDER — TORSEMIDE 20 MG/1
20 TABLET ORAL
Status: DISCONTINUED | OUTPATIENT
Start: 2025-01-06 | End: 2025-01-05

## 2025-01-05 RX ORDER — MORPHINE SULFATE 20 MG/ML
5 SOLUTION ORAL
Status: DISCONTINUED | OUTPATIENT
Start: 2025-01-05 | End: 2025-01-05

## 2025-01-05 RX ORDER — CLONAZEPAM 0.5 MG/1
0.5 TABLET ORAL EVERY 12 HOURS
Status: DISCONTINUED | OUTPATIENT
Start: 2025-01-05 | End: 2025-01-05

## 2025-01-05 RX ORDER — ALLOPURINOL 100 MG/1
100 TABLET ORAL DAILY
Status: DISCONTINUED | OUTPATIENT
Start: 2025-01-06 | End: 2025-01-05

## 2025-01-05 RX ORDER — CETIRIZINE HYDROCHLORIDE 5 MG/1
10 TABLET ORAL DAILY
Status: DISCONTINUED | OUTPATIENT
Start: 2025-01-06 | End: 2025-01-05

## 2025-01-05 RX ORDER — MONTELUKAST SODIUM 10 MG/1
10 TABLET ORAL NIGHTLY
Status: DISCONTINUED | OUTPATIENT
Start: 2025-01-05 | End: 2025-01-05

## 2025-01-05 RX ORDER — GLIPIZIDE 10 MG/1
10 TABLET ORAL 2 TIMES DAILY
Status: DISCONTINUED | OUTPATIENT
Start: 2025-01-05 | End: 2025-01-05

## 2025-01-05 RX ORDER — FAMOTIDINE 40 MG/5ML
20 POWDER, FOR SUSPENSION ORAL DAILY
Status: DISCONTINUED | OUTPATIENT
Start: 2025-01-06 | End: 2025-01-05

## 2025-01-05 RX ORDER — MIDODRINE HYDROCHLORIDE 5 MG/1
5 TABLET ORAL
Status: DISCONTINUED | OUTPATIENT
Start: 2025-01-06 | End: 2025-01-05

## 2025-01-05 RX ORDER — LEVOTHYROXINE SODIUM 125 UG/1
125 TABLET ORAL DAILY
Status: DISCONTINUED | OUTPATIENT
Start: 2025-01-06 | End: 2025-01-05

## 2025-01-05 RX ORDER — IPRATROPIUM BROMIDE AND ALBUTEROL SULFATE 2.5; .5 MG/3ML; MG/3ML
1 SOLUTION RESPIRATORY (INHALATION)
Status: DISCONTINUED | OUTPATIENT
Start: 2025-01-05 | End: 2025-01-05

## 2025-01-05 RX ORDER — PROCHLORPERAZINE MALEATE 10 MG
10 TABLET ORAL EVERY 8 HOURS PRN
Status: DISCONTINUED | OUTPATIENT
Start: 2025-01-05 | End: 2025-01-05

## 2025-01-05 RX ORDER — LORAZEPAM 0.5 MG/1
0.5 TABLET ORAL EVERY 4 HOURS PRN
Status: DISCONTINUED | OUTPATIENT
Start: 2025-01-05 | End: 2025-01-05

## 2025-01-05 RX ORDER — CITALOPRAM HYDROBROMIDE 20 MG/1
40 TABLET ORAL DAILY
Status: DISCONTINUED | OUTPATIENT
Start: 2025-01-06 | End: 2025-01-05

## 2025-01-05 RX ORDER — DEXAMETHASONE 1 MG
2 TABLET ORAL DAILY
Status: DISCONTINUED | OUTPATIENT
Start: 2025-01-06 | End: 2025-01-05

## 2025-01-05 RX ORDER — COLCHICINE 0.6 MG/1
0.6 TABLET ORAL DAILY
Status: DISCONTINUED | OUTPATIENT
Start: 2025-01-06 | End: 2025-01-05

## 2025-01-05 RX ORDER — BISACODYL 5 MG/1
5 TABLET, DELAYED RELEASE ORAL DAILY
Status: DISCONTINUED | OUTPATIENT
Start: 2025-01-06 | End: 2025-01-05

## 2025-01-05 RX ADMIN — MICONAZOLE NITRATE: 2 POWDER TOPICAL at 22:00

## 2025-01-05 RX ADMIN — TAMSULOSIN HYDROCHLORIDE 0.4 MG: 0.4 CAPSULE ORAL at 21:46

## 2025-01-05 RX ADMIN — BUSPIRONE HYDROCHLORIDE 10 MG: 10 TABLET ORAL at 13:54

## 2025-01-05 RX ADMIN — BUSPIRONE HYDROCHLORIDE 10 MG: 10 TABLET ORAL at 09:29

## 2025-01-05 RX ADMIN — BUDESONIDE 500 MCG: 0.5 INHALANT ORAL at 19:35

## 2025-01-05 RX ADMIN — RIVAROXABAN 15 MG: 15 TABLET, FILM COATED ORAL at 21:46

## 2025-01-05 RX ADMIN — ACETAMINOPHEN 650 MG: 325 TABLET ORAL at 21:46

## 2025-01-05 RX ADMIN — IPRATROPIUM BROMIDE AND ALBUTEROL SULFATE 1 DOSE: 2.5; .5 SOLUTION RESPIRATORY (INHALATION) at 10:41

## 2025-01-05 RX ADMIN — SENNOSIDES 8.6 MG: 8.6 TABLET, FILM COATED ORAL at 21:46

## 2025-01-05 RX ADMIN — SENNOSIDES 8.6 MG: 8.6 TABLET, FILM COATED ORAL at 09:30

## 2025-01-05 RX ADMIN — BUSPIRONE HYDROCHLORIDE 10 MG: 10 TABLET ORAL at 21:46

## 2025-01-05 RX ADMIN — IPRATROPIUM BROMIDE AND ALBUTEROL SULFATE 1 DOSE: 2.5; .5 SOLUTION RESPIRATORY (INHALATION) at 06:26

## 2025-01-05 RX ADMIN — Medication 5 MG: at 21:46

## 2025-01-05 RX ADMIN — MICONAZOLE NITRATE: 2 POWDER TOPICAL at 09:30

## 2025-01-05 RX ADMIN — METOPROLOL SUCCINATE 25 MG: 25 TABLET, EXTENDED RELEASE ORAL at 09:30

## 2025-01-05 RX ADMIN — BUDESONIDE 500 MCG: 0.5 INHALANT ORAL at 06:26

## 2025-01-05 RX ADMIN — IPRATROPIUM BROMIDE AND ALBUTEROL SULFATE 1 DOSE: 2.5; .5 SOLUTION RESPIRATORY (INHALATION) at 19:35

## 2025-01-05 RX ADMIN — LORAZEPAM 1 MG: 0.5 TABLET ORAL at 21:46

## 2025-01-05 RX ADMIN — PREDNISONE 10 MG: 10 TABLET ORAL at 09:30

## 2025-01-05 RX ADMIN — IPRATROPIUM BROMIDE AND ALBUTEROL SULFATE 1 DOSE: 2.5; .5 SOLUTION RESPIRATORY (INHALATION) at 14:40

## 2025-01-05 ASSESSMENT — PAIN SCALES - GENERAL: PAINLEVEL_OUTOF10: 5

## 2025-01-05 NOTE — PROGRESS NOTES
Patient has a coccyx that is very deep red and also has a rash.  He is placed on his left side and is on an air mattress.  He states that at home, he has had a brief that was wet on at times for too long.

## 2025-01-05 NOTE — PROGRESS NOTES
Patient is complaining of not being able to sleep and stated that often he takes something to help him rest along with an anxiety pill.  Explained that he already took buspar.  He stated at times he took the ativan also.  The tylenol he took for a headache.  So at this time ativan 1 mg given with Tylenol 650 mg.  Will monitor.

## 2025-01-05 NOTE — PROGRESS NOTES
Pt arrived to [facility] by EMS to room # 4 for Respite level of care due to being on 9L oxygen and due to potential adverse weather was sent to [facility] to ensure he has adequate oxygen supply.  Pt also lives alone at home with a neighbor that helps him out when he requires assistance.  Pt is alert and oriented x 4 with no noted confusion.  Pt denies pain and symptoms at time of admission.   Pt's admission assessment completed and later again and documented by STEVENSON Patel.  Pt's skin assessment completed.  Bath given by SHARONDA Kirby.  Wound care to coccyx completed with clear mepilex placed to coccyx.  Powder/cornstarch to groin rash.  Pt repositioned with pillows used to support body, extremities and to off load heels.  Pt's bed was placed in lowest position, locked with side rails raised x 3, call light within reach, and frequent checks by staff.

## 2025-01-05 NOTE — PROGRESS NOTES
Breathing treatment given, and oxygen is at 9 liters with no humidification.  He has a diagnosis of copd, and he states he usually only needs oxygen of he is exerted.  At this time. Oxygen decreased to 6 liters and he is pulse oximetry is 100%.  Will continue to monitor.  He also took his medications without difficulty.  Alert and oriented.  Answering questions appropriately

## 2025-01-05 NOTE — PROGRESS NOTES
Scheduled medications given by STEVENSON Patel.  Pt had no symptoms or needs at this time.  Safety measures in place.

## 2025-01-05 NOTE — PROGRESS NOTES
Dr Small gave orders to continue home medications, and to add order for nystatin powder to groin rash, and to add one time order for Diflucan 150 mg tonight.  Pt to be admitted at Respite level of care.

## 2025-01-05 NOTE — PROGRESS NOTES
Pt's dinner was set up by PCA and Pt had fed himself. Pt had consumed 75% of his meal and PCA attempted to reposition Pt in bed.  Pt refused to turn and reposition per PCA.  Pt denied symptoms and needs.  Safety measures in place.

## 2025-01-05 NOTE — PROGRESS NOTES
Turned and repositioned at this time.  Also on air flow mattress.  Turning and repositioning encouraged for patient related to coccyx being reddened.  He voiced understanding.

## 2025-01-06 PROCEDURE — 6370000000 HC RX 637 (ALT 250 FOR IP): Performed by: FAMILY MEDICINE

## 2025-01-06 PROCEDURE — 6360000002 HC RX W HCPCS: Performed by: FAMILY MEDICINE

## 2025-01-06 PROCEDURE — 2700000000 HC OXYGEN THERAPY PER DAY

## 2025-01-06 PROCEDURE — 94640 AIRWAY INHALATION TREATMENT: CPT

## 2025-01-06 PROCEDURE — 6550000002 HC HOSPICE INPATIENT RESPITE

## 2025-01-06 PROCEDURE — 6370000000 HC RX 637 (ALT 250 FOR IP): Performed by: INTERNAL MEDICINE

## 2025-01-06 RX ADMIN — METOPROLOL SUCCINATE 25 MG: 25 TABLET, EXTENDED RELEASE ORAL at 08:20

## 2025-01-06 RX ADMIN — RIVAROXABAN 15 MG: 15 TABLET, FILM COATED ORAL at 17:20

## 2025-01-06 RX ADMIN — MICONAZOLE NITRATE: 2 POWDER TOPICAL at 21:17

## 2025-01-06 RX ADMIN — SENNOSIDES 8.6 MG: 8.6 TABLET, FILM COATED ORAL at 08:20

## 2025-01-06 RX ADMIN — ACETAMINOPHEN 650 MG: 325 TABLET ORAL at 21:14

## 2025-01-06 RX ADMIN — Medication 5 MG: at 21:13

## 2025-01-06 RX ADMIN — LORAZEPAM 1 MG: 0.5 TABLET ORAL at 21:14

## 2025-01-06 RX ADMIN — SENNOSIDES 8.6 MG: 8.6 TABLET, FILM COATED ORAL at 21:14

## 2025-01-06 RX ADMIN — MICONAZOLE NITRATE: 2 POWDER TOPICAL at 12:04

## 2025-01-06 RX ADMIN — IPRATROPIUM BROMIDE AND ALBUTEROL SULFATE 1 DOSE: 2.5; .5 SOLUTION RESPIRATORY (INHALATION) at 11:10

## 2025-01-06 RX ADMIN — PREDNISONE 10 MG: 10 TABLET ORAL at 08:20

## 2025-01-06 RX ADMIN — Medication 5 MG: at 18:56

## 2025-01-06 RX ADMIN — BUDESONIDE 500 MCG: 0.5 INHALANT ORAL at 07:55

## 2025-01-06 RX ADMIN — BUSPIRONE HYDROCHLORIDE 10 MG: 10 TABLET ORAL at 08:20

## 2025-01-06 RX ADMIN — IPRATROPIUM BROMIDE AND ALBUTEROL SULFATE 1 DOSE: 2.5; .5 SOLUTION RESPIRATORY (INHALATION) at 07:55

## 2025-01-06 RX ADMIN — IPRATROPIUM BROMIDE AND ALBUTEROL SULFATE 1 DOSE: 2.5; .5 SOLUTION RESPIRATORY (INHALATION) at 15:30

## 2025-01-06 RX ADMIN — TAMSULOSIN HYDROCHLORIDE 0.4 MG: 0.4 CAPSULE ORAL at 21:14

## 2025-01-06 RX ADMIN — BUSPIRONE HYDROCHLORIDE 10 MG: 10 TABLET ORAL at 21:14

## 2025-01-06 ASSESSMENT — PAIN SCALES - GENERAL: PAINLEVEL_OUTOF10: 5

## 2025-01-06 NOTE — PROGRESS NOTES
Pt report and rounding with off going shift staff.  Pt is sleeping with FLACC of 0 and has no symptoms of pain or distress.  Pt's bed in lowest position, locked with side rails raised x 3 call light within reach, and frequent checks by staff.

## 2025-01-06 NOTE — PROGRESS NOTES
Pt lying in bed after eating lunch sleeping with FLACC of 0 and has no symptoms of pain or distress.  Safety measures in place.

## 2025-01-06 NOTE — PROGRESS NOTES
Pt sleeping soundly with FLACC of 0, does not respond to voice, and has no symptoms of distress.  Pt not awakened.  Safety measures in place.

## 2025-01-06 NOTE — PROGRESS NOTES
Dr Small on HCC unit and is aware of Pt's Respite level of care due to being on oxygen reported to be 9L at home and bad weather advisory and Pt living alone.  Pt's SOA and oxygen discussed.  Pt has no new needs at this time and remains at Respite level of care.

## 2025-01-06 NOTE — PROGRESS NOTES
Patient resting with eyes closed during rounds with offgoing shift. Respirations even and unlabored. Felix draining properly.

## 2025-01-06 NOTE — PROGRESS NOTES
Dr. Trent in hospice care center making rounds. Patient remains respite level of care, no new orders received.

## 2025-01-06 NOTE — PROGRESS NOTES
visit to provide sc support.  The pt denies pain.  He states his breathing is better today.  He expresses brittanie and accepted the offer for prayer.  He did seem confused about his location thinking his neighbor is letting people in to visit.  Prayer support provided. No needs identified.  No family present.  Will follow.

## 2025-01-06 NOTE — PROGRESS NOTES
Scheduled Buspirone given with sips of water.  Pt denies pain , SOA, symptoms, and needs at this time.  Safety measures in place.

## 2025-01-06 NOTE — PROGRESS NOTES
PRN ROXANOL 5 MG GIVEN PER PATIENT REQUEST, then patient requests for an hour later that PRN TYLENOL 650 MG AND PRN ATIVAN 1 MG BE GIVEN to help him sleep.  Took scheduled ;medications whole with no difficulty. Assessment.     2205 FOLLOW UP FOR PRN ROXANOL 5 MG GIVEN.   Patient is trying to watch the weather forecast on the news.  Patient does not want his tylenol or ativan yet.  Denies any pain at present.  Will continue to monitor.     2250, Patient takes his tylenol and ativan.  Will continue to monitor.     At 2330, follow up for prn tylenol and prn ativan given per patient request.  Patient is resting with eyes closed, respirations nonlabored.  FLACC 0.

## 2025-01-06 NOTE — PROGRESS NOTES
Rounding with change of shift report from Reina RN.  Patient is sitting up in bed, smiling, pleasant.  Safety precautions in place of bed in low position, locked.  Upper side rails x 2 raised.  Call light within reach.  Will continue to monitor.

## 2025-01-07 PROCEDURE — 94640 AIRWAY INHALATION TREATMENT: CPT

## 2025-01-07 PROCEDURE — 6370000000 HC RX 637 (ALT 250 FOR IP): Performed by: FAMILY MEDICINE

## 2025-01-07 PROCEDURE — 6550000002 HC HOSPICE INPATIENT RESPITE

## 2025-01-07 PROCEDURE — 6370000000 HC RX 637 (ALT 250 FOR IP): Performed by: INTERNAL MEDICINE

## 2025-01-07 PROCEDURE — 6360000002 HC RX W HCPCS: Performed by: FAMILY MEDICINE

## 2025-01-07 PROCEDURE — 2700000000 HC OXYGEN THERAPY PER DAY

## 2025-01-07 RX ADMIN — LORAZEPAM 1 MG: 0.5 TABLET ORAL at 22:30

## 2025-01-07 RX ADMIN — BUDESONIDE 500 MCG: 0.5 INHALANT ORAL at 18:46

## 2025-01-07 RX ADMIN — ACETAMINOPHEN 650 MG: 325 TABLET ORAL at 22:30

## 2025-01-07 RX ADMIN — BUSPIRONE HYDROCHLORIDE 10 MG: 10 TABLET ORAL at 14:13

## 2025-01-07 RX ADMIN — BUDESONIDE 500 MCG: 0.5 INHALANT ORAL at 06:09

## 2025-01-07 RX ADMIN — RIVAROXABAN 15 MG: 15 TABLET, FILM COATED ORAL at 18:15

## 2025-01-07 RX ADMIN — IPRATROPIUM BROMIDE AND ALBUTEROL SULFATE 1 DOSE: 2.5; .5 SOLUTION RESPIRATORY (INHALATION) at 10:50

## 2025-01-07 RX ADMIN — PREDNISONE 10 MG: 10 TABLET ORAL at 09:38

## 2025-01-07 RX ADMIN — SENNOSIDES 8.6 MG: 8.6 TABLET, FILM COATED ORAL at 09:37

## 2025-01-07 RX ADMIN — MICONAZOLE NITRATE: 2 POWDER TOPICAL at 09:38

## 2025-01-07 RX ADMIN — IPRATROPIUM BROMIDE AND ALBUTEROL SULFATE 1 DOSE: 2.5; .5 SOLUTION RESPIRATORY (INHALATION) at 06:09

## 2025-01-07 RX ADMIN — TAMSULOSIN HYDROCHLORIDE 0.4 MG: 0.4 CAPSULE ORAL at 21:23

## 2025-01-07 RX ADMIN — METOPROLOL SUCCINATE 25 MG: 25 TABLET, EXTENDED RELEASE ORAL at 09:38

## 2025-01-07 RX ADMIN — BUSPIRONE HYDROCHLORIDE 10 MG: 10 TABLET ORAL at 21:24

## 2025-01-07 RX ADMIN — Medication 5 MG: at 21:23

## 2025-01-07 RX ADMIN — BUSPIRONE HYDROCHLORIDE 10 MG: 10 TABLET ORAL at 09:37

## 2025-01-07 RX ADMIN — IPRATROPIUM BROMIDE AND ALBUTEROL SULFATE 1 DOSE: 2.5; .5 SOLUTION RESPIRATORY (INHALATION) at 15:14

## 2025-01-07 RX ADMIN — SENNOSIDES 8.6 MG: 8.6 TABLET, FILM COATED ORAL at 21:24

## 2025-01-07 RX ADMIN — MICONAZOLE NITRATE: 2 POWDER TOPICAL at 21:25

## 2025-01-07 RX ADMIN — IPRATROPIUM BROMIDE AND ALBUTEROL SULFATE 1 DOSE: 2.5; .5 SOLUTION RESPIRATORY (INHALATION) at 18:46

## 2025-01-07 ASSESSMENT — PAIN SCALES - GENERAL: PAINLEVEL_OUTOF10: 3

## 2025-01-07 NOTE — PROGRESS NOTES
Catheter care given to patient.  Then placed micotin powder as ordered.  Also placed barrier cream to reddened scrotum and powder again.  Patient with deep reddened buttocks/coccyx.  No open areas.  Area is sore to patient at times.  Removed transparent dressing.  Left coccyx/buttocks open to air after placing barrier cream and powder to area.  Tolerated well.  Scheduled medications given.  No difficulty.  Requests PRN Roxanol 5 mg as his nightly routine.  Helps his breathing.  Will continue to monitor.     At 2230, follow up for prn roxanol 5 mg.  Patient states he is comfortable.  PRN TYLENOL 650 MG AND PRN ATIVAN 1 MG GIVEN as patient states this is how he takes it at home and he does well.     At 2330, follow up for prn tylenol and prn ativan given.  Patient resting with eyes closed, respirations unlabored.  FLACC 0.

## 2025-01-07 NOTE — PROGRESS NOTES
Pt report and rounding with off going shift staff.  Pt was sleeping with FLACC of 0 and has no symptoms of pain or distress.  Pt was awakened to check kramer for leakage and was found to be clean and dry.  Pt denied symptoms and needs.  Pt's bed in lowest position, locked with side rails raised x 3, call light within reach, and frequent checks by staff.

## 2025-01-07 NOTE — PROGRESS NOTES
Patient resting in bed.  Pleasant.  Safety precautions in place of bed in low position, locked.  Upper side rails x 2 raised.  Call light within reach.

## 2025-01-08 VITALS
RESPIRATION RATE: 16 BRPM | SYSTOLIC BLOOD PRESSURE: 110 MMHG | TEMPERATURE: 97.4 F | DIASTOLIC BLOOD PRESSURE: 64 MMHG | HEART RATE: 62 BPM | OXYGEN SATURATION: 99 %

## 2025-01-08 PROCEDURE — 6370000000 HC RX 637 (ALT 250 FOR IP): Performed by: FAMILY MEDICINE

## 2025-01-08 PROCEDURE — 2700000000 HC OXYGEN THERAPY PER DAY

## 2025-01-08 PROCEDURE — 94640 AIRWAY INHALATION TREATMENT: CPT

## 2025-01-08 PROCEDURE — 6360000002 HC RX W HCPCS: Performed by: FAMILY MEDICINE

## 2025-01-08 RX ADMIN — BUDESONIDE 500 MCG: 0.5 INHALANT ORAL at 06:59

## 2025-01-08 RX ADMIN — PREDNISONE 10 MG: 10 TABLET ORAL at 08:42

## 2025-01-08 RX ADMIN — BUSPIRONE HYDROCHLORIDE 10 MG: 10 TABLET ORAL at 08:42

## 2025-01-08 RX ADMIN — IPRATROPIUM BROMIDE AND ALBUTEROL SULFATE 1 DOSE: 2.5; .5 SOLUTION RESPIRATORY (INHALATION) at 10:33

## 2025-01-08 RX ADMIN — METOPROLOL SUCCINATE 25 MG: 25 TABLET, EXTENDED RELEASE ORAL at 08:42

## 2025-01-08 RX ADMIN — IPRATROPIUM BROMIDE AND ALBUTEROL SULFATE 1 DOSE: 2.5; .5 SOLUTION RESPIRATORY (INHALATION) at 06:59

## 2025-01-08 RX ADMIN — MICONAZOLE NITRATE: 2 POWDER TOPICAL at 08:42

## 2025-01-08 RX ADMIN — SENNOSIDES 8.6 MG: 8.6 TABLET, FILM COATED ORAL at 08:42

## 2025-01-08 ASSESSMENT — PAIN SCALES - GENERAL: PAINLEVEL_OUTOF10: 4

## 2025-01-08 ASSESSMENT — PAIN DESCRIPTION - LOCATION: LOCATION: BUTTOCKS;BACK;KNEE

## 2025-01-08 NOTE — PROGRESS NOTES
Patient stated that previous nights that ativan and tylenol has worked really well to keep him pain free, less anxious and overall he slept better with this combination.  States his pain is a 3/10.  Ativan 1 mg given po and tylenol 650 mg given po at this time.  Will monitor effectiveness.

## 2025-01-08 NOTE — PROGRESS NOTES
Pt given his scheduled medications with sips of water.  Pt turned and repositioned to his left side with pillows used to support body, extremities, and to off load heels.  Pt denies symptoms and needs, and is pleasant.  Pt's bed placed back to lowest position, locked with side rails raised x 3, call light, and BST with water within reach.

## 2025-01-08 NOTE — PROGRESS NOTES
Follow up Roxanol 5 mg given at 2123:  Patient states he feels he is breathing better and bones aren't as achy.  Will continue to monitor

## 2025-01-08 NOTE — PROGRESS NOTES
Pt report and rounding with off going shift staff.  Pt was sleeping with FLACC of 0 and has no symptoms of pain or distress.  Safety precautions in place, bed locked in lowest position, with side rails raised x 3, call light within reach, and frequent checks by staff.

## 2025-01-08 NOTE — PROGRESS NOTES
Pt's assessment completed.  Scheduled medications given without difficulty.  Pt is alert and oriented, denies pain, symptoms, and needs.  Safety measures in place, call light and bedside table within reach.

## 2025-01-08 NOTE — PROGRESS NOTES
[facility]. Appropriate paperwork filled out for EMS. Discharge summary sent in [place] folder for home hospice nurse Amy PICKETT. Report already called to Amy PICKETT with [facility] at 1025am.

## 2025-01-08 NOTE — PROGRESS NOTES
Pt repositioned to his right side with pillows used to support body, extremities and to off load heels.  Pt denies symptoms and needs.  Safety measures in place.

## 2025-01-08 NOTE — PROGRESS NOTES
Scheduled medications given.  Pt repositioned to float on back with pillows used to support body, extremities, and to off load heels.  Pt denies symptoms and needs.  BST with water, and call light within reach.  Safety measures in place.

## 2025-01-08 NOTE — PROGRESS NOTES
Mr. Horner is respite LOC in [address] at the [facility].  He is alert and oriented in his bed with O2 on.  He continues in his home with only limited support.  This has been on ongoing concern on his previous stay with us and for his outpatient hospice team.  He continues to insist that he is going home with his brother and neighbor and niece providing support.  He is going home by non-emergent EMS today. I called [facility] and set this up, requesting an 11:15am .  Called brother Hai from bedside to confirm he would be at Mr. Horner's home to receive him.  They voiced that there was concern that his home O2 concentrators were not as effective as the ones he has used here.  He was concerned that there as an issue with them.  I informed [facility] of this.  EMS DNR was signed by Mr. Horner.  He will be followed by AMRITA Franco for outpatient hospice needs with [facility].

## 2025-01-08 NOTE — PROGRESS NOTES
Patient is requesting roxanol to help with his pain in his joints and to help with breathing effort.  Rating pain a 5/10.  Roxanol 5 mg given sublingual at this time.  Will monitor for effectiveness.

## 2025-01-08 NOTE — PROGRESS NOTES
FOLLOW UP ATIVAN AND TYLENOL:  He is resting quietly with eyes closed.  No furrow to brow.  Pain per flacc is 0/10.  No anxiety noted.

## (undated) DEVICE — SENSR O2 OXIMAX FNGR A/ 18IN NONSTR

## (undated) DEVICE — DIL VESL 12F.038 20CM

## (undated) DEVICE — SINGLE USE SUCTION VALVE MAJ-209: Brand: SINGLE USE SUCTION VALVE (STERILE)

## (undated) DEVICE — CUFF,BP,DISP,1 TUBE,ADULT,HP: Brand: MEDLINE

## (undated) DEVICE — Device: Brand: THERMOCOOL SMARTTOUCH SF

## (undated) DEVICE — TBG SMPL FLTR LINE NASL 02/C02 A/ BX/100

## (undated) DEVICE — CONMED SCOPE SAVER BITE BLOCK, 20X27 MM: Brand: SCOPE SAVER

## (undated) DEVICE — PK TURNOVER CYSTO RM

## (undated) DEVICE — Device: Brand: SOUNDSTAR

## (undated) DEVICE — PAD, DEFIB, ADULT, RADIOTRANS, PHYSIO: Brand: MEDLINE

## (undated) DEVICE — CATH URETH FLEXIMA CONE TP 5F 70CM

## (undated) DEVICE — SHEATH INTRO MICRA HC 23F 55.7CM

## (undated) DEVICE — SNAR POLYP CAPTIVATOR MICROHEX 13 240CM

## (undated) DEVICE — DIL VESL 16F .038 20CM

## (undated) DEVICE — SLV CBL IVL 5X96IN

## (undated) DEVICE — SUREFIT, DUAL DISPERSIVE ELECTRODE, CONTACT QUALITY MONITOR: Brand: SUREFIT

## (undated) DEVICE — THE CHANNEL CLEANING BRUSH IS A NYLON FLEXI BRUSH ATTACHED TO A FLEXIBLE PLASTIC SHEATH DESIGNED TO SAFELY REMOVE DEBRIS FROM FLEXIBLE ENDOSCOPES.

## (undated) DEVICE — FRCP BX RADJAW4 NDL 2.8 240 STD OG

## (undated) DEVICE — TBG DRN URINARY 9/32IN

## (undated) DEVICE — TBG PRESS/MONITR FIX M/F LL A/ 48IN STRL

## (undated) DEVICE — MASK,OXYGEN,MED CONC,ADLT,7' TUB, UC: Brand: PENDING

## (undated) DEVICE — TBG PRESS EXT

## (undated) DEVICE — GOWN,NON-REINFORCED,SIRUS,SET IN SLV,XXL: Brand: MEDLINE

## (undated) DEVICE — Device: Brand: DEFENDO AIR/WATER/SUCTION AND BIOPSY VALVE

## (undated) DEVICE — Device: Brand: BALLOON

## (undated) DEVICE — SINGLE USE BIOPSY VALVE MAJ-210: Brand: SINGLE USE BIOPSY VALVE (STERILE)

## (undated) DEVICE — CANN NASL ETCO2 LO/FLO A/

## (undated) DEVICE — PINNACLE INTRODUCER SHEATH: Brand: PINNACLE

## (undated) DEVICE — ENDOGATOR AUXILIARY WATER JET CONNECTOR: Brand: ENDOGATOR

## (undated) DEVICE — YANKAUER,BULB TIP WITH VENT: Brand: ARGYLE

## (undated) DEVICE — SOL IRR NACL 0.9PCT BT 1000ML

## (undated) DEVICE — THE SINGLE USE ETRAP – POLYP TRAP IS USED FOR SUCTION RETRIEVAL OF ENDOSCOPICALLY REMOVED POLYPS.: Brand: ETRAP

## (undated) DEVICE — CLTH CLENS READYCLEANSE PERI CARE PK/5

## (undated) DEVICE — DIL VESL STD .038 20F 20CM

## (undated) DEVICE — ADAPT SWVL FIBROPTIC BRONCH

## (undated) DEVICE — GLV SURG BIOGEL M LTX PF 8

## (undated) DEVICE — SOLIDIFIER LIQ LIQUILOC/PLUS W/TREAT 2000CC

## (undated) DEVICE — Device: Brand: SMARTABLATE

## (undated) DEVICE — PK CATH CARD 30 CA/4

## (undated) DEVICE — PK CYSTO 30

## (undated) DEVICE — TRAP,MUCUS SPECIMEN, 80CC: Brand: MEDLINE

## (undated) DEVICE — KT NDL GUIDE STRL 18GA